# Patient Record
Sex: FEMALE | Race: WHITE | NOT HISPANIC OR LATINO | Employment: OTHER | URBAN - METROPOLITAN AREA
[De-identification: names, ages, dates, MRNs, and addresses within clinical notes are randomized per-mention and may not be internally consistent; named-entity substitution may affect disease eponyms.]

---

## 2017-02-06 ENCOUNTER — LAB VISIT (OUTPATIENT)
Dept: LAB | Facility: OTHER | Age: 72
End: 2017-02-06
Attending: INTERNAL MEDICINE
Payer: MEDICARE

## 2017-02-06 DIAGNOSIS — R73.9 ELEVATED BLOOD SUGAR: ICD-10-CM

## 2017-02-06 DIAGNOSIS — E03.9 HYPOTHYROIDISM, UNSPECIFIED TYPE: ICD-10-CM

## 2017-02-06 LAB
ALBUMIN SERPL BCP-MCNC: 3.9 G/DL
ALP SERPL-CCNC: 52 U/L
ALT SERPL W/O P-5'-P-CCNC: 28 U/L
ANION GAP SERPL CALC-SCNC: 8 MMOL/L
AST SERPL-CCNC: 27 U/L
BILIRUB DIRECT SERPL-MCNC: 0.3 MG/DL
BILIRUB SERPL-MCNC: 0.5 MG/DL
BUN SERPL-MCNC: 16 MG/DL
CALCIUM SERPL-MCNC: 9.5 MG/DL
CHLORIDE SERPL-SCNC: 106 MMOL/L
CO2 SERPL-SCNC: 26 MMOL/L
CREAT SERPL-MCNC: 1 MG/DL
EST. GFR  (AFRICAN AMERICAN): >60 ML/MIN/1.73 M^2
EST. GFR  (NON AFRICAN AMERICAN): 56 ML/MIN/1.73 M^2
GLUCOSE SERPL-MCNC: 195 MG/DL
POTASSIUM SERPL-SCNC: 4.7 MMOL/L
PROT SERPL-MCNC: 6.8 G/DL
SODIUM SERPL-SCNC: 140 MMOL/L
T4 FREE SERPL-MCNC: 1.22 NG/DL
TSH SERPL DL<=0.005 MIU/L-ACNC: 0.32 UIU/ML

## 2017-02-06 PROCEDURE — 80076 HEPATIC FUNCTION PANEL: CPT

## 2017-02-06 PROCEDURE — 80048 BASIC METABOLIC PNL TOTAL CA: CPT

## 2017-02-06 PROCEDURE — 84443 ASSAY THYROID STIM HORMONE: CPT

## 2017-02-06 PROCEDURE — 83036 HEMOGLOBIN GLYCOSYLATED A1C: CPT

## 2017-02-06 PROCEDURE — 36415 COLL VENOUS BLD VENIPUNCTURE: CPT

## 2017-02-06 PROCEDURE — 84439 ASSAY OF FREE THYROXINE: CPT

## 2017-02-07 LAB
ESTIMATED AVG GLUCOSE: 143 MG/DL
HBA1C MFR BLD HPLC: 6.6 %

## 2017-02-20 ENCOUNTER — OFFICE VISIT (OUTPATIENT)
Dept: UROLOGY | Facility: CLINIC | Age: 72
End: 2017-02-20
Attending: UROLOGY
Payer: MEDICARE

## 2017-02-20 VITALS
SYSTOLIC BLOOD PRESSURE: 121 MMHG | DIASTOLIC BLOOD PRESSURE: 70 MMHG | HEIGHT: 65 IN | BODY MASS INDEX: 25.61 KG/M2 | WEIGHT: 153.69 LBS | HEART RATE: 65 BPM

## 2017-02-20 DIAGNOSIS — N39.0 RECURRENT UTI: Primary | ICD-10-CM

## 2017-02-20 LAB
BILIRUB SERPL-MCNC: NORMAL MG/DL
BLOOD URINE, POC: NORMAL
COLOR, POC UA: YELLOW
GLUCOSE UR QL STRIP: NORMAL
KETONES UR QL STRIP: NORMAL
LEUKOCYTE ESTERASE URINE, POC: NORMAL
NITRITE, POC UA: NORMAL
PH, POC UA: 5
POC RESIDUAL URINE VOLUME: 33 ML (ref 0–100)
PROTEIN, POC: NORMAL
SPECIFIC GRAVITY, POC UA: 1
UROBILINOGEN, POC UA: NORMAL

## 2017-02-20 PROCEDURE — 51798 US URINE CAPACITY MEASURE: CPT | Mod: PBBFAC | Performed by: UROLOGY

## 2017-02-20 PROCEDURE — 99213 OFFICE O/P EST LOW 20 MIN: CPT | Mod: S$PBB,,, | Performed by: UROLOGY

## 2017-02-20 PROCEDURE — 99999 PR PBB SHADOW E&M-EST. PATIENT-LVL III: CPT | Mod: PBBFAC,,, | Performed by: UROLOGY

## 2017-02-20 PROCEDURE — 99213 OFFICE O/P EST LOW 20 MIN: CPT | Mod: PBBFAC | Performed by: UROLOGY

## 2017-02-20 PROCEDURE — 81002 URINALYSIS NONAUTO W/O SCOPE: CPT | Mod: PBBFAC | Performed by: UROLOGY

## 2017-02-20 RX ORDER — TRAMADOL HYDROCHLORIDE 50 MG/1
50 TABLET ORAL EVERY 12 HOURS
Refills: 0 | COMMUNITY
Start: 2017-02-08 | End: 2017-08-21

## 2017-02-20 NOTE — MR AVS SNAPSHOT
[facility]  [address] [place] [address], [address]  [facility] [address]  Phone: [phone]                   Hardik Judd   2017 11:45 AM   Office Visit    Description:  Female : 1945   Provider:  Db Epstein MD   Department:  [facility]           Reason for Visit     Follow-up                To Do List           Goals (5 Years of Data)     None      [facility]     [facility] Care [facility] -  Assistance  Registered nurses in the [facility] provide clinical advisement, health education, appointment booking, and other advisory services.  Call for this free service at [phone].             Medications           Message regarding Medications     Verify the changes and/or additions to your medication regime listed below are the same as discussed with your clinician today.  If any of these changes or additions are incorrect, please notify your healthcare provider.             Verify that the below list of medications is an accurate representation of the medications you are currently taking.  If none reported, the list may be blank. If incorrect, please contact your healthcare provider. Carry this list with you in case of emergency.           Current Medications     atorvastatin (LIPITOR) 40 MG tablet take 1 tablet by mouth once daily    azelastine (ASTELIN) 137 mcg nasal spray 1 spray (137 mcg total) by Nasal route 2 (two) times daily.    blood sugar diagnostic Strp Dispense madie contour strips; test blood sugar once daily    blood-glucose meter (CONTOUR METER) kit Please dispense Madie Contour meter and supplies Use as instructed Test Blood sugar once daily    ciprofloxacin HCl (CIPRO) 250 MG tablet     conjugated estrogens (PREMARIN) vaginal cream Place 0.5 g vaginally twice a week.    fluticasone (FLONASE) 50 mcg/actuation nasal spray 2 sprays by Each Nare route once daily.    GINGER ROOT (GINGER EXTRACT ORAL) Take by mouth.    hydrocortisone 2.5  "% cream     ipratropium (ATROVENT) 0.06 % nasal spray     lancets (ONE TOUCH ULTRASOFT LANCETS) Misc Test blood sugar once daily    levothyroxine (SYNTHROID) 88 MCG tablet Take 1 tablet (88 mcg total) by mouth before breakfast. 1 Tablet Oral Every morning    magnesium 30 mg Tab Take 500 capsules by mouth. Patient's taking magnesium 500mg QD    meloxicam (MOBIC) 15 MG tablet Take 1 tablet (15 mg total) by mouth daily as needed for Pain. With food for arthritis    metformin (GLUCOPHAGE-XR) 750 MG 24 hr tablet Take 1 tablet (750 mg total) by mouth 2 (two) times daily with meals.    ospemifene (OSPHENA) 60 mg Tab Take 60 mg by mouth once daily.    pregabalin (LYRICA) 100 MG capsule Take 1 capsule (100 mg total) by mouth 2 (two) times daily.    tramadol (ULTRAM) 50 mg tablet Take 50 mg by mouth every 12 (twelve) hours.    turmeric root extract 500 mg Cap Take by mouth.    vitamin D 1000 units Tab Take 185 mg by mouth once daily. D3           Clinical Reference Information           Your Vitals Were     BP Pulse Height Weight Last Period BMI    121/70 (BP Location: Right arm, Patient Position: Sitting, BP Method: Automatic) 65 5' 4.8" (1.646 m) 69.7 kg (153 lb 10.6 oz) (LMP Unknown) 25.73 kg/m2      Blood Pressure          Most Recent Value    BP  121/70      Allergies as of 2/20/2017     Codeine    Percocet  [Oxycodone-acetaminophen]    Sulfa (Sulfonamide Antibiotics)    Adhesive      Immunizations Administered on Date of Encounter - 2/20/2017     None      Language Assistance Services     ATTENTION: Language assistance services are available, free of charge. Please call [phone].      ATENCIÓN: Si rickyla linomarii, tiene a españa disposición servicios gratuitos de asistencia lingüística. Llame al [phone].     CHARLA Ý: N?u b?n nói Ti?ng Vi?t, có các d?ch v? h? tr? ngôn ng? mi?n phí dành cho b?n. G?i s? [phone].         [facility] complies with applicable Federal civil rights laws and does not " discriminate on the basis of race, color, national origin, age, disability, or sex.

## 2017-02-20 NOTE — PROGRESS NOTES
"Subjective:      Anayeli Judd is a 72 y.o. female who returns today regarding her recurrent UTIs.    She has not had UTI since starting prophylactic cipro 3 months ago. Had negative workup prior to that. Had at least 4 culture proven UTIs in 2016 prior to starting treatment.    Doing well today w/o c/o.    Her history is notable for three "bladder lifts," the last over 10 years ago and that she knows was done with mesh.    The following portions of the patient's history were reviewed and updated as appropriate: allergies, current medications, past family history, past medical history, past social history, past surgical history and problem list.    Review of Systems  A comprehensive multipoint review of systems was negative except as otherwise stated in the HPI.     Objective:   Vitals:   Visit Vitals    /70 (BP Location: Right arm, Patient Position: Sitting, BP Method: Automatic)    Pulse 65    Ht 5' 4.8" (1.646 m)    Wt 69.7 kg (153 lb 10.6 oz)    LMP  (LMP Unknown)    BMI 25.73 kg/m2       Physical Exam   General: alert and oriented, no acute distress  Respiratory: Symmetric expansion, non-labored breathing  Neuro: no gross deficits  Psych: normal judgment and insight, normal mood/affect and non-anxious    Bladder Scan PVR: 33cc      Lab Review   Urinalysis demonstrates negative for all components  Lab Results   Component Value Date    WBC 4.25 11/02/2016    HGB 13.4 11/02/2016    HCT 40.8 11/02/2016    HCT 23 (L) 09/03/2014    MCV 93 11/02/2016     (L) 11/02/2016     Lab Results   Component Value Date    CREATININE 1.0 02/06/2017    BUN 16 02/06/2017     Imaging   None today    Assessment and Plan:   1. Recurrent UTI  - Continue Cipro 250mg daily for 3 more months  - UA/UCx PRN  - FU 6 months  "

## 2017-03-29 ENCOUNTER — HOSPITAL ENCOUNTER (OUTPATIENT)
Dept: RADIOLOGY | Facility: OTHER | Age: 72
Discharge: HOME | DRG: 329 | End: 2017-03-29
Attending: INTERNAL MEDICINE
Payer: MEDICARE

## 2017-03-29 DIAGNOSIS — R10.9 ABDOMINAL PAIN, UNSPECIFIED LOCATION: ICD-10-CM

## 2017-03-29 PROCEDURE — 74020 XR ABDOMEN FLAT AND ERECT: CPT | Mod: 26,,, | Performed by: RADIOLOGY

## 2017-03-30 ENCOUNTER — HOSPITAL ENCOUNTER (INPATIENT)
Facility: HOSPITAL | Age: 72
LOS: 21 days | Discharge: HOME HEALTH | DRG: 329 | End: 2017-04-20
Attending: EMERGENCY MEDICINE | Admitting: SURGERY
Payer: MEDICARE

## 2017-03-30 DIAGNOSIS — N81.84 PELVIC MUSCLE WASTING: ICD-10-CM

## 2017-03-30 DIAGNOSIS — E08.21 DIABETES MELLITUS DUE TO UNDERLYING CONDITION WITH DIABETIC NEPHROPATHY, WITHOUT LONG-TERM CURRENT USE OF INSULIN: ICD-10-CM

## 2017-03-30 DIAGNOSIS — E68 SEQUELAE OF HYPERALIMENTATION: ICD-10-CM

## 2017-03-30 DIAGNOSIS — Z80.3 FAMILY HISTORY OF BREAST CANCER: ICD-10-CM

## 2017-03-30 DIAGNOSIS — K56.609 SMALL BOWEL OBSTRUCTION: ICD-10-CM

## 2017-03-30 DIAGNOSIS — R20.0 NUMBNESS OF FACE: ICD-10-CM

## 2017-03-30 DIAGNOSIS — K56.600 PARTIAL SMALL BOWEL OBSTRUCTION: ICD-10-CM

## 2017-03-30 DIAGNOSIS — R26.9 GAIT DISTURBANCE: ICD-10-CM

## 2017-03-30 DIAGNOSIS — M47.22 CERVICAL SPONDYLOSIS WITH RADICULOPATHY: ICD-10-CM

## 2017-03-30 DIAGNOSIS — Z85.038 HISTORY OF COLON CANCER: ICD-10-CM

## 2017-03-30 DIAGNOSIS — E03.4 HYPOTHYROIDISM DUE TO ACQUIRED ATROPHY OF THYROID: ICD-10-CM

## 2017-03-30 DIAGNOSIS — K76.0 FATTY LIVER: ICD-10-CM

## 2017-03-30 DIAGNOSIS — K56.609 SBO (SMALL BOWEL OBSTRUCTION): Primary | ICD-10-CM

## 2017-03-30 DIAGNOSIS — I82.629: ICD-10-CM

## 2017-03-30 DIAGNOSIS — E11.9 TYPE 2 DIABETES MELLITUS WITHOUT COMPLICATION, WITHOUT LONG-TERM CURRENT USE OF INSULIN: Chronic | ICD-10-CM

## 2017-03-30 DIAGNOSIS — K92.2 GASTROINTESTINAL HEMORRHAGE, UNSPECIFIED GASTROINTESTINAL HEMORRHAGE TYPE: ICD-10-CM

## 2017-03-30 DIAGNOSIS — E83.42 HYPOMAGNESEMIA: ICD-10-CM

## 2017-03-30 LAB
ABO + RH BLD: NORMAL
ALBUMIN SERPL BCP-MCNC: 4.1 G/DL
ALP SERPL-CCNC: 56 U/L
ALT SERPL W/O P-5'-P-CCNC: 23 U/L
ANION GAP SERPL CALC-SCNC: 13 MMOL/L
AST SERPL-CCNC: 24 U/L
BASOPHILS # BLD AUTO: 0.03 K/UL
BASOPHILS NFR BLD: 0.3 %
BILIRUB SERPL-MCNC: 1.1 MG/DL
BLD GP AB SCN CELLS X3 SERPL QL: NORMAL
BUN SERPL-MCNC: 14 MG/DL
CALCIUM SERPL-MCNC: 10.3 MG/DL
CHLORIDE SERPL-SCNC: 101 MMOL/L
CO2 SERPL-SCNC: 23 MMOL/L
CREAT SERPL-MCNC: 1.1 MG/DL
DIFFERENTIAL METHOD: ABNORMAL
EOSINOPHIL # BLD AUTO: 0.4 K/UL
EOSINOPHIL NFR BLD: 3.6 %
ERYTHROCYTE [DISTWIDTH] IN BLOOD BY AUTOMATED COUNT: 12.5 %
EST. GFR  (AFRICAN AMERICAN): 57.2 ML/MIN/1.73 M^2
EST. GFR  (NON AFRICAN AMERICAN): 49.6 ML/MIN/1.73 M^2
GLUCOSE SERPL-MCNC: 153 MG/DL
HCT VFR BLD AUTO: 45.5 %
HGB BLD-MCNC: 15.7 G/DL
INR PPP: 1
LYMPHOCYTES # BLD AUTO: 2.3 K/UL
LYMPHOCYTES NFR BLD: 23.9 %
MCH RBC QN AUTO: 31.7 PG
MCHC RBC AUTO-ENTMCNC: 34.5 %
MCV RBC AUTO: 92 FL
MONOCYTES # BLD AUTO: 1.1 K/UL
MONOCYTES NFR BLD: 11.7 %
NEUTROPHILS # BLD AUTO: 5.8 K/UL
NEUTROPHILS NFR BLD: 60.4 %
PLATELET # BLD AUTO: 121 K/UL
PMV BLD AUTO: 11.4 FL
POCT GLUCOSE: 98 MG/DL (ref 70–110)
POTASSIUM SERPL-SCNC: 4.2 MMOL/L
PROT SERPL-MCNC: 8.1 G/DL
PROTHROMBIN TIME: 10.7 SEC
RBC # BLD AUTO: 4.96 M/UL
SODIUM SERPL-SCNC: 137 MMOL/L
WBC # BLD AUTO: 9.63 K/UL

## 2017-03-30 PROCEDURE — 94761 N-INVAS EAR/PLS OXIMETRY MLT: CPT

## 2017-03-30 PROCEDURE — 25500020 PHARM REV CODE 255: Performed by: EMERGENCY MEDICINE

## 2017-03-30 PROCEDURE — 86900 BLOOD TYPING SEROLOGIC ABO: CPT

## 2017-03-30 PROCEDURE — 85025 COMPLETE CBC W/AUTO DIFF WBC: CPT

## 2017-03-30 PROCEDURE — 63600175 PHARM REV CODE 636 W HCPCS: Performed by: GENERAL PRACTICE

## 2017-03-30 PROCEDURE — 93010 ELECTROCARDIOGRAM REPORT: CPT | Mod: ,,, | Performed by: INTERNAL MEDICINE

## 2017-03-30 PROCEDURE — 83036 HEMOGLOBIN GLYCOSYLATED A1C: CPT

## 2017-03-30 PROCEDURE — 99285 EMERGENCY DEPT VISIT HI MDM: CPT | Mod: ,,, | Performed by: SURGERY

## 2017-03-30 PROCEDURE — 85610 PROTHROMBIN TIME: CPT

## 2017-03-30 PROCEDURE — 80053 COMPREHEN METABOLIC PANEL: CPT

## 2017-03-30 PROCEDURE — 86850 RBC ANTIBODY SCREEN: CPT

## 2017-03-30 PROCEDURE — 96375 TX/PRO/DX INJ NEW DRUG ADDON: CPT

## 2017-03-30 PROCEDURE — 82962 GLUCOSE BLOOD TEST: CPT

## 2017-03-30 PROCEDURE — 96376 TX/PRO/DX INJ SAME DRUG ADON: CPT

## 2017-03-30 PROCEDURE — 12000002 HC ACUTE/MED SURGE SEMI-PRIVATE ROOM

## 2017-03-30 PROCEDURE — 86920 COMPATIBILITY TEST SPIN: CPT

## 2017-03-30 PROCEDURE — 25000003 PHARM REV CODE 250: Performed by: GENERAL PRACTICE

## 2017-03-30 PROCEDURE — 93005 ELECTROCARDIOGRAM TRACING: CPT

## 2017-03-30 PROCEDURE — 96361 HYDRATE IV INFUSION ADD-ON: CPT

## 2017-03-30 PROCEDURE — 96374 THER/PROPH/DIAG INJ IV PUSH: CPT

## 2017-03-30 PROCEDURE — 99285 EMERGENCY DEPT VISIT HI MDM: CPT | Mod: 25

## 2017-03-30 PROCEDURE — 63600175 PHARM REV CODE 636 W HCPCS: Performed by: SURGERY

## 2017-03-30 RX ORDER — INSULIN ASPART 100 [IU]/ML
0-5 INJECTION, SOLUTION INTRAVENOUS; SUBCUTANEOUS EVERY 6 HOURS PRN
Status: DISCONTINUED | OUTPATIENT
Start: 2017-03-30 | End: 2017-04-08

## 2017-03-30 RX ORDER — AMOXICILLIN AND CLAVULANATE POTASSIUM 562.5; 437.5; 62.5 MG/1; MG/1; MG/1
2 TABLET, FILM COATED, EXTENDED RELEASE ORAL 2 TIMES DAILY
Status: ON HOLD | COMMUNITY
End: 2017-04-20 | Stop reason: HOSPADM

## 2017-03-30 RX ORDER — DIPHENHYDRAMINE HYDROCHLORIDE 50 MG/ML
25 INJECTION INTRAMUSCULAR; INTRAVENOUS EVERY 4 HOURS PRN
Status: DISCONTINUED | OUTPATIENT
Start: 2017-03-30 | End: 2017-04-01

## 2017-03-30 RX ORDER — SODIUM CHLORIDE 0.9 % (FLUSH) 0.9 %
3 SYRINGE (ML) INJECTION EVERY 8 HOURS
Status: DISCONTINUED | OUTPATIENT
Start: 2017-03-31 | End: 2017-04-20 | Stop reason: HOSPADM

## 2017-03-30 RX ORDER — ONDANSETRON 2 MG/ML
4 INJECTION INTRAMUSCULAR; INTRAVENOUS EVERY 8 HOURS PRN
Status: DISCONTINUED | OUTPATIENT
Start: 2017-03-30 | End: 2017-04-01

## 2017-03-30 RX ORDER — GLUCAGON 1 MG
1 KIT INJECTION
Status: DISCONTINUED | OUTPATIENT
Start: 2017-03-30 | End: 2017-04-11

## 2017-03-30 RX ORDER — SODIUM CHLORIDE 9 MG/ML
INJECTION, SOLUTION INTRAVENOUS CONTINUOUS
Status: DISCONTINUED | OUTPATIENT
Start: 2017-03-30 | End: 2017-03-31

## 2017-03-30 RX ORDER — MORPHINE SULFATE 2 MG/ML
4 INJECTION, SOLUTION INTRAMUSCULAR; INTRAVENOUS
Status: COMPLETED | OUTPATIENT
Start: 2017-03-30 | End: 2017-03-30

## 2017-03-30 RX ORDER — MORPHINE SULFATE 2 MG/ML
2 INJECTION, SOLUTION INTRAMUSCULAR; INTRAVENOUS EVERY 4 HOURS PRN
Status: DISCONTINUED | OUTPATIENT
Start: 2017-03-30 | End: 2017-04-01

## 2017-03-30 RX ORDER — ONDANSETRON 2 MG/ML
4 INJECTION INTRAMUSCULAR; INTRAVENOUS
Status: COMPLETED | OUTPATIENT
Start: 2017-03-30 | End: 2017-03-30

## 2017-03-30 RX ADMIN — MORPHINE SULFATE 4 MG: 2 INJECTION, SOLUTION INTRAMUSCULAR; INTRAVENOUS at 09:03

## 2017-03-30 RX ADMIN — ONDANSETRON 4 MG: 2 INJECTION INTRAMUSCULAR; INTRAVENOUS at 09:03

## 2017-03-30 RX ADMIN — IOHEXOL 75 ML: 350 INJECTION, SOLUTION INTRAVENOUS at 09:03

## 2017-03-30 RX ADMIN — SODIUM CHLORIDE 1000 ML: 0.9 INJECTION, SOLUTION INTRAVENOUS at 09:03

## 2017-03-30 RX ADMIN — MORPHINE SULFATE 2 MG: 2 INJECTION, SOLUTION INTRAMUSCULAR; INTRAVENOUS at 11:03

## 2017-03-30 NOTE — IP AVS SNAPSHOT
[facility]  [address]  [facility] [address]  Phone: [phone]           Patient Discharge Instructions   Our goal is to set you up for success. This packet includes information on your condition, medications, and your home care.  It will help you care for yourself to prevent having to return to the hospital.     Please ask your nurse if you have any questions.      There are many details to remember when preparing to leave the hospital. Here is what you will need to do:    1. Take your medicine. If you are prescribed medications, review your Medication List on the following pages. You may have new medications to  at the pharmacy and others that you'll need to stop taking. Review the instructions for how and when to take your medications. Talk with your doctor or nurses if you are unsure of what to do.     2. Go to your follow-up appointments. Specific follow-up information is listed in the following pages. Your may be contacted by a nurse or clinical provider about future appointments. Be sure we have all of the phone numbers to reach you. Please contact your provider's office if you are unable to make an appointment.     3. Watch for warning signs. Your doctor or nurse will give you detailed warning signs to watch for and when to call for assistance. These instructions may also include educational information about your condition. If you experience any of warning signs to your health, call your doctor.           [facility]  Unless otherwise directed by your provider, please   contact [facility], our nurse care line   that is available for 24/7 assistance.     [phone] (toll-free)     Registered nurses in the [facility]   provide: appointment scheduling, clinical advisement, health education, and other advisory services.                  ** Verify the list of medication(s) below is accurate and up to date. Carry this with you in case of  emergency. If your medications have changed, please notify your healthcare provider.             Medication List      START taking these medications        Additional Info                      diphenhydrAMINE 25 mg capsule   Commonly known as:  BENADRYL   Refills:  0   Dose:  25 mg    Last time this was given:  25 mg on 4/19/2017 11:13 PM   Instructions:  Take 1 each (25 mg total) by mouth nightly as needed for Itching, Allergies or Insomnia.     Begin Date    AM    Noon    PM    Bedtime       hydrocodone-acetaminophen 5-325mg 5-325 mg per tablet   Commonly known as:  NORCO   Quantity:  41 tablet   Refills:  0   Dose:  1 tablet    Last time this was given:  1 tablet on 4/20/2017  4:46 AM   Instructions:  Take 1 tablet by mouth every 6 (six) hours as needed.     Begin Date    AM    Noon    PM    Bedtime       pantoprazole 40 MG tablet   Commonly known as:  PROTONIX   Refills:  0   Dose:  40 mg    Last time this was given:  40 mg on 4/20/2017 10:54 AM   Instructions:  Take 1 tablet (40 mg total) by mouth once daily.     Begin Date    AM    Noon    PM    Bedtime       polyethylene glycol 17 gram Pwpk   Commonly known as:  GLYCOLAX   Refills:  0   Dose:  17 g    Last time this was given:  17 g on 4/20/2017 11:01 AM   Instructions:  Take 17 g by mouth once daily.     Begin Date    AM    Noon    PM    Bedtime       senna-docusate 8.6-50 mg 8.6-50 mg per tablet   Commonly known as:  PERICOLACE   Refills:  0   Dose:  1 tablet    Last time this was given:  1 tablet on 4/15/2017  8:17 AM   Instructions:  Take 1 tablet by mouth once daily.     Begin Date    AM    Noon    PM    Bedtime         CONTINUE taking these medications        Additional Info                      atorvastatin 40 MG tablet   Commonly known as:  LIPITOR   Quantity:  90 tablet   Refills:  1    Instructions:  take 1 tablet by mouth once daily     Begin Date    AM    Noon    PM    Bedtime       azelastine 137 mcg (0.1 %) nasal spray   Commonly known as:   ASTELIN   Quantity:  30 mL   Refills:  1   Dose:  1 spray    Instructions:  1 spray (137 mcg total) by Nasal route 2 (two) times daily.     Begin Date    AM    Noon    PM    Bedtime       blood sugar diagnostic Strp   Quantity:  100 strip   Refills:  11    Instructions:  Dispense OpenQ contour strips; test blood sugar once daily     Begin Date    AM    Noon    PM    Bedtime       blood-glucose meter kit   Commonly known as:  CONTOUR METER   Quantity:  1 each   Refills:  0    Instructions:  Please dispense Stabilitech Contour meter and supplies Use as instructed Test Blood sugar once daily     Begin Date    AM    Noon    PM    Bedtime       conjugated estrogens vaginal cream   Commonly known as:  PREMARIN   Quantity:  1 applicator   Refills:  3   Dose:  0.5 g    Instructions:  Place 0.5 g vaginally twice a week.     Begin Date    AM    Noon    PM    Bedtime       fluticasone 50 mcg/actuation nasal spray   Commonly known as:  FLONASE   Refills:  0   Dose:  2 spray    Instructions:  2 sprays by Each Nare route once daily.     Begin Date    AM    Noon    PM    Bedtime       hydrocortisone 2.5 % cream   Refills:  0      Begin Date    AM    Noon    PM    Bedtime       ipratropium 0.06 % nasal spray   Commonly known as:  ATROVENT   Refills:  0      Begin Date    AM    Noon    PM    Bedtime       lancets Misc   Commonly known as:  ONETOUCH ULTRASOFT LANCETS   Quantity:  200 each   Refills:  11    Instructions:  Test blood sugar once daily     Begin Date    AM    Noon    PM    Bedtime       levothyroxine 88 MCG tablet   Commonly known as:  SYNTHROID   Quantity:  90 tablet   Refills:  4   Dose:  88 mcg    Last time this was given:  88 mcg on 4/20/2017  5:39 AM   Instructions:  Take 1 tablet (88 mcg total) by mouth before breakfast. 1 Tablet Oral Every morning     Begin Date    AM    Noon    PM    Bedtime       LYRICA 100 MG capsule   Quantity:  180 capsule   Refills:  3   Generic drug:  pregabalin    Instructions:  take 1 capsule  by mouth twice a day     Begin Date    AM    Noon    PM    Bedtime       magnesium 30 mg Tab   Refills:  0   Dose:  500 capsule    Instructions:  Take 500 capsules by mouth. Patient's taking magnesium 500mg QD     Begin Date    AM    Noon    PM    Bedtime       meloxicam 15 MG tablet   Commonly known as:  MOBIC   Quantity:  90 tablet   Refills:  1   Dose:  15 mg    Instructions:  Take 1 tablet (15 mg total) by mouth daily as needed for Pain. With food for arthritis     Begin Date    AM    Noon    PM    Bedtime       metformin 750 MG 24 hr tablet   Commonly known as:  GLUCOPHAGE-XR   Quantity:  180 tablet   Refills:  1   Dose:  750 mg    Instructions:  Take 1 tablet (750 mg total) by mouth 2 (two) times daily with meals.     Begin Date    AM    Noon    PM    Bedtime       ondansetron 8 MG tablet   Commonly known as:  ZOFRAN   Quantity:  20 tablet   Refills:  0   Dose:  8 mg    Instructions:  Take 1 tablet (8 mg total) by mouth every 8 (eight) hours as needed for Nausea.     Begin Date    AM    Noon    PM    Bedtime       ospemifene 60 mg Tab   Commonly known as:  OSPHENA   Quantity:  90 tablet   Refills:  1   Dose:  60 mg    Instructions:  Take 60 mg by mouth once daily.     Begin Date    AM    Noon    PM    Bedtime       tramadol 50 mg tablet   Commonly known as:  ULTRAM   Refills:  0   Dose:  50 mg    Instructions:  Take 50 mg by mouth every 12 (twelve) hours.     Begin Date    AM    Noon    PM    Bedtime       vitamin D 1000 units Tab   Refills:  0   Dose:  185 mg    Instructions:  Take 185 mg by mouth once daily. D3     Begin Date    AM    Noon    PM    Bedtime         STOP taking these medications     amoxicillin-clavulanate 1,000-62.5 mg 1,000-62.5 mg per tablet   Commonly known as:  AUGMENTIN XR       ciprofloxacin HCl 250 MG tablet   Commonly known as:  CIPRO       GINGER EXTRACT ORAL       turmeric root extract 500 mg Cap            Where to Get Your Medications      You can get these medications from any  pharmacy     Bring a paper prescription for each of these medications     hydrocodone-acetaminophen 5-325mg 5-325 mg per tablet         Information about where to get these medications is not yet available     ! Ask your nurse or doctor about these medications     diphenhydrAMINE 25 mg capsule    pantoprazole 40 MG tablet    polyethylene glycol 17 gram Pwpk    senna-docusate 8.6-50 mg 8.6-50 mg per tablet                  Please bring to all follow up appointments:    1. A copy of your discharge instructions.  2. All medicines you are currently taking in their original bottles.  3. Identification and insurance card.    Please arrive 15 minutes ahead of scheduled appointment time.    Please call 24 hours in advance if you must reschedule your appointment and/or time.        Your Scheduled Appointments     May 09, 2017 10:15 AM CDT   Post OP with MD Matias Olivares [facility] - General Surgery ([facility] )    [address]  [place] [id number] [phone]            Aug 21, 2017 10:30 AM CDT   Established Patient Visit with Db Epstein MD   [facility] - Urology ([facility])    [address] [place] [address] [address]  [facility] [address] [phone]              Follow-up Information     Follow up with Herman Fletcher MD In 2 weeks.    Specialties:  General Surgery, Surgery    Why:  Post-op appt/Office to call you w/appt. Appt scheduled on 5/9/17 at 10:15 AM.     Contact information:    [address] [facility] [address]  [phone]          Follow up with Soni Solo.    Specialties:  Nursing Home Agency, SNF Agency    Why:  SNF    Contact information:    [address] [facility] [address]  [phone]          Follow up with Herman Fletcher MD In 2 weeks.    Specialties:  General Surgery, Surgery    Why:  For wound re-check    Contact information:    [address] [facility] [id number] [phone]          Follow up with Soni Solo  "Nursing HOme Today.    Why:  Pt will be admitted for SNF for placement.    Contact information:    # [phone]          Primary Diagnosis     Your primary diagnosis was:  Small Bowel Obstruction      Admission Information     Date & Time Provider Department CSN    3/30/2017  7:19 PM Herman Fletcher MD [facility] [id number]      Care Providers     Provider Role Specialty Primary office phone    Herman Fletcher MD Attending Provider General Surgery [phone]    Herman Fletcher MD Surgeon  General Surgery [phone]    Horacio Erazo MD Consulting Physician  Colon and Rectal Surgery  [phone]      Important Medicare Message          Most Recent Value    Important Message from Medicare Regarding Discharge Appeal Rights  Given to patient/caregiver, Explained to patient/caregiver, Signed/date by patient/caregiver yes 04/19/2017 1255      Your Vitals Were     BP Pulse Temp Resp Height Weight    116/59 (BP Location: Right arm, Patient Position: Lying, BP Method: Automatic) 70 99.6 °F (37.6 °C) (Oral) 18 5' 4" (1.626 m) 69.9 kg (154 lb)    Last Period SpO2 BMI          (LMP Unknown) 95% 26.43 kg/m2        Recent Lab Values        5/7/2015 7/8/2015 11/5/2015 5/16/2016 9/21/2016 11/2/2016 2/6/2017 3/30/2017      7:28 AM  1:03 PM 10:35 AM 11:12 AM 11:20 AM  8:23 AM  3:05 PM 11:04 PM    A1C 6.7 (H) 6.0 6.6 (H) 7.2 (H) 6.8 (H) 6.5 (H) 6.6 (H) 6.6 (H)    Comment for A1C at 11:20 AM on 9/21/2016:  According to ADA guidelines, hemoglobin A1C <7.0% represents  optimal control in non-pregnant diabetic patients.  Different  metrics may apply to specific populations.   Standards of Medical Care in Diabetes - 2016.  For the purpose of screening for the presence of diabetes:  <5.7%     Consistent with the absence of diabetes  5.7-6.4%  Consistent with increasing risk for diabetes   (prediabetes)  >or=6.5%  Consistent with diabetes  Currently no consensus exists for use of hemoglobin A1C  for " diagnosis of diabetes for children.      Comment for A1C at  8:23 AM on 11/2/2016:  According to ADA guidelines, hemoglobin A1C <7.0% represents  optimal control in non-pregnant diabetic patients.  Different  metrics may apply to specific populations.   Standards of Medical Care in Diabetes - 2016.  For the purpose of screening for the presence of diabetes:  <5.7%     Consistent with the absence of diabetes  5.7-6.4%  Consistent with increasing risk for diabetes   (prediabetes)  >or=6.5%  Consistent with diabetes  Currently no consensus exists for use of hemoglobin A1C  for diagnosis of diabetes for children.      Comment for A1C at  3:05 PM on 2/6/2017:  According to ADA guidelines, hemoglobin A1C <7.0% represents  optimal control in non-pregnant diabetic patients.  Different  metrics may apply to specific populations.   Standards of Medical Care in Diabetes - 2016.  For the purpose of screening for the presence of diabetes:  <5.7%     Consistent with the absence of diabetes  5.7-6.4%  Consistent with increasing risk for diabetes   (prediabetes)  >or=6.5%  Consistent with diabetes  Currently no consensus exists for use of hemoglobin A1C  for diagnosis of diabetes for children.      Comment for A1C at 11:04 PM on 3/30/2017:  According to ADA guidelines, hemoglobin A1C <7.0% represents  optimal control in non-pregnant diabetic patients.  Different  metrics may apply to specific populations.   Standards of Medical Care in Diabetes - 2016.  For the purpose of screening for the presence of diabetes:  <5.7%     Consistent with the absence of diabetes  5.7-6.4%  Consistent with increasing risk for diabetes   (prediabetes)  >or=6.5%  Consistent with diabetes  Currently no consensus exists for use of hemoglobin A1C  for diagnosis of diabetes for children.        Pending Labs     Order Current Status    CBC auto differential In process    Comprehensive metabolic panel In process    Magnesium In process    Prepare RBC 2  Units; GI bleed Preliminary result      Allergies as of 4/20/2017        Reactions    Codeine Nausea And Vomiting    Other reaction(s): Itching    Percocet  [Oxycodone-acetaminophen]     Other reaction(s): Itching    Sulfa (Sulfonamide Antibiotics)     Other reaction(s): Nausea  Other reaction(s): Itching    Adhesive Rash      Advance Directives     An advance directive is a document which, in the event you are no longer able to make decisions for yourself, tells your healthcare team what kind of treatment you do or do not want to receive, or who you would like to make those decisions for you.  If you do not currently have an advance directive, [facility] encourages you to create one.  For more information call:  [phone]), [phone] ([phone]),  or log on to [email or web address].        Language Assistance Services     ATTENTION: Language assistance services are available, free of charge. Please call [phone].      ATENCIÓN: Si habla español, tiene a españa disposición servicios gratuitos de asistencia lingüística. Llame al [phone].     [facility] Ý: N?u b?n nói Ti?ng Vi?t, có các d?ch v? h? tr? ngôn ng? mi?n phí dành cho b?n. G?i s? [phone].        Blood Transfusion Reaction Signs and Symptoms     The blood you have received has been matched for you as carefully as possible. Most patients who receive a blood transfusion do not experience problems. However, there can be a delayed reaction that happens a few weeks after your blood transfusion. Contact your physician immediately if you experience any NEW SYMPTOMS listed below:     Fever greater than 100.4 degrees    Chills   Yellow color to your skin or eyes(Jaundice)   Back pain, chest pain, or pain at the infusion site   Weakness (more than usual)   Discomfort or uneasiness more than usual (Malaise)   Nausea or vomiting   Shortness of breath, wheezing, or coughing   Higher or lower blood pressure than normal   Skin rash,  itching, skin redness, or localized swelling (example: hands or feet)   Urinating less than normal   Urine appears reddish or orange and is darker than normal      Remember that some these signs may already exist for you--such as having chronic back pain or high blood pressure. You only need to look for and report to your doctor any new occurrences since your blood transfusion that are of concern.        Diabetes Discharge Instructions                                    [facility] complies with applicable Federal civil rights laws and does not discriminate on the basis of race, color, national origin, age, disability, or sex.

## 2017-03-31 ENCOUNTER — ANESTHESIA EVENT (OUTPATIENT)
Dept: SURGERY | Facility: HOSPITAL | Age: 72
DRG: 329 | End: 2017-03-31
Payer: MEDICARE

## 2017-03-31 LAB
ALBUMIN SERPL BCP-MCNC: 3.5 G/DL
ALP SERPL-CCNC: 52 U/L
ALT SERPL W/O P-5'-P-CCNC: 24 U/L
ANION GAP SERPL CALC-SCNC: 9 MMOL/L
AST SERPL-CCNC: 27 U/L
BASOPHILS # BLD AUTO: 0.02 K/UL
BASOPHILS NFR BLD: 0.3 %
BILIRUB SERPL-MCNC: 0.9 MG/DL
BILIRUB UR QL STRIP: NEGATIVE
BUN SERPL-MCNC: 15 MG/DL
CALCIUM SERPL-MCNC: 9.2 MG/DL
CHLORIDE SERPL-SCNC: 103 MMOL/L
CLARITY UR REFRACT.AUTO: CLEAR
CO2 SERPL-SCNC: 24 MMOL/L
COLOR UR AUTO: YELLOW
CREAT SERPL-MCNC: 1 MG/DL
DIFFERENTIAL METHOD: ABNORMAL
EOSINOPHIL # BLD AUTO: 0.4 K/UL
EOSINOPHIL NFR BLD: 5.7 %
ERYTHROCYTE [DISTWIDTH] IN BLOOD BY AUTOMATED COUNT: 12.6 %
EST. GFR  (AFRICAN AMERICAN): >60 ML/MIN/1.73 M^2
EST. GFR  (NON AFRICAN AMERICAN): 55.6 ML/MIN/1.73 M^2
ESTIMATED AVG GLUCOSE: 143 MG/DL
GLUCOSE SERPL-MCNC: 131 MG/DL
GLUCOSE UR QL STRIP: NEGATIVE
HBA1C MFR BLD HPLC: 6.6 %
HCT VFR BLD AUTO: 38.8 %
HGB BLD-MCNC: 13.2 G/DL
HGB UR QL STRIP: NEGATIVE
KETONES UR QL STRIP: NEGATIVE
LEUKOCYTE ESTERASE UR QL STRIP: NEGATIVE
LYMPHOCYTES # BLD AUTO: 2.2 K/UL
LYMPHOCYTES NFR BLD: 32.9 %
MAGNESIUM SERPL-MCNC: 1.6 MG/DL
MCH RBC QN AUTO: 30.6 PG
MCHC RBC AUTO-ENTMCNC: 34 %
MCV RBC AUTO: 90 FL
MONOCYTES # BLD AUTO: 1 K/UL
MONOCYTES NFR BLD: 14.6 %
NEUTROPHILS # BLD AUTO: 3.1 K/UL
NEUTROPHILS NFR BLD: 46.4 %
NITRITE UR QL STRIP: NEGATIVE
PH UR STRIP: 5 [PH] (ref 5–8)
PHOSPHATE SERPL-MCNC: 4.3 MG/DL
PLATELET # BLD AUTO: 120 K/UL
PMV BLD AUTO: 11.8 FL
POCT GLUCOSE: 118 MG/DL (ref 70–110)
POCT GLUCOSE: 68 MG/DL (ref 70–110)
POCT GLUCOSE: 94 MG/DL (ref 70–110)
POTASSIUM SERPL-SCNC: 4.3 MMOL/L
PROT SERPL-MCNC: 6.7 G/DL
PROT UR QL STRIP: NEGATIVE
RBC # BLD AUTO: 4.31 M/UL
SODIUM SERPL-SCNC: 136 MMOL/L
SP GR UR STRIP: >1.03 (ref 1–1.03)
URN SPEC COLLECT METH UR: ABNORMAL
UROBILINOGEN UR STRIP-ACNC: NEGATIVE EU/DL
WBC # BLD AUTO: 6.72 K/UL

## 2017-03-31 PROCEDURE — 83735 ASSAY OF MAGNESIUM: CPT

## 2017-03-31 PROCEDURE — 11000001 HC ACUTE MED/SURG PRIVATE ROOM

## 2017-03-31 PROCEDURE — 81003 URINALYSIS AUTO W/O SCOPE: CPT

## 2017-03-31 PROCEDURE — 25000003 PHARM REV CODE 250: Performed by: SURGERY

## 2017-03-31 PROCEDURE — 36415 COLL VENOUS BLD VENIPUNCTURE: CPT

## 2017-03-31 PROCEDURE — 99223 1ST HOSP IP/OBS HIGH 75: CPT | Mod: AI,GC,, | Performed by: SURGERY

## 2017-03-31 PROCEDURE — 63600175 PHARM REV CODE 636 W HCPCS: Performed by: SURGERY

## 2017-03-31 PROCEDURE — 85025 COMPLETE CBC W/AUTO DIFF WBC: CPT

## 2017-03-31 PROCEDURE — 84100 ASSAY OF PHOSPHORUS: CPT

## 2017-03-31 PROCEDURE — 80053 COMPREHEN METABOLIC PANEL: CPT

## 2017-03-31 PROCEDURE — 94760 N-INVAS EAR/PLS OXIMETRY 1: CPT

## 2017-03-31 PROCEDURE — 25000003 PHARM REV CODE 250

## 2017-03-31 PROCEDURE — C9113 INJ PANTOPRAZOLE SODIUM, VIA: HCPCS | Performed by: SURGERY

## 2017-03-31 PROCEDURE — 25000003 PHARM REV CODE 250: Performed by: STUDENT IN AN ORGANIZED HEALTH CARE EDUCATION/TRAINING PROGRAM

## 2017-03-31 RX ORDER — ENOXAPARIN SODIUM 100 MG/ML
40 INJECTION SUBCUTANEOUS EVERY 24 HOURS
Status: DISCONTINUED | OUTPATIENT
Start: 2017-03-31 | End: 2017-04-01 | Stop reason: SDUPTHER

## 2017-03-31 RX ORDER — DEXTROSE MONOHYDRATE 50 MG/ML
INJECTION, SOLUTION INTRAVENOUS CONTINUOUS
Status: DISCONTINUED | OUTPATIENT
Start: 2017-03-31 | End: 2017-04-01

## 2017-03-31 RX ORDER — LANOLIN ALCOHOL/MO/W.PET/CERES
400 CREAM (GRAM) TOPICAL 2 TIMES DAILY
Status: DISCONTINUED | OUTPATIENT
Start: 2017-03-31 | End: 2017-03-31

## 2017-03-31 RX ORDER — PANTOPRAZOLE SODIUM 40 MG/10ML
40 INJECTION, POWDER, LYOPHILIZED, FOR SOLUTION INTRAVENOUS DAILY
Status: DISCONTINUED | OUTPATIENT
Start: 2017-03-31 | End: 2017-04-15

## 2017-03-31 RX ORDER — HYDROCODONE BITARTRATE AND ACETAMINOPHEN 500; 5 MG/1; MG/1
TABLET ORAL
Status: DISCONTINUED | OUTPATIENT
Start: 2017-03-31 | End: 2017-04-01

## 2017-03-31 RX ORDER — SIMETHICONE 80 MG
1 TABLET,CHEWABLE ORAL 3 TIMES DAILY
Status: DISCONTINUED | OUTPATIENT
Start: 2017-03-31 | End: 2017-04-01

## 2017-03-31 RX ORDER — SODIUM CHLORIDE, SODIUM LACTATE, POTASSIUM CHLORIDE, CALCIUM CHLORIDE 600; 310; 30; 20 MG/100ML; MG/100ML; MG/100ML; MG/100ML
INJECTION, SOLUTION INTRAVENOUS CONTINUOUS
Status: DISCONTINUED | OUTPATIENT
Start: 2017-03-31 | End: 2017-04-06

## 2017-03-31 RX ADMIN — Medication 3 ML: at 05:03

## 2017-03-31 RX ADMIN — Medication 3 ML: at 02:03

## 2017-03-31 RX ADMIN — MORPHINE SULFATE 2 MG: 2 INJECTION, SOLUTION INTRAMUSCULAR; INTRAVENOUS at 08:03

## 2017-03-31 RX ADMIN — DEXTROSE 1000 ML: 5 SOLUTION INTRAVENOUS at 07:03

## 2017-03-31 RX ADMIN — Medication 3 ML: at 09:03

## 2017-03-31 RX ADMIN — MAGNESIUM OXIDE TAB 400 MG (241.3 MG ELEMENTAL MG) 400 MG: 400 (241.3 MG) TAB at 12:03

## 2017-03-31 RX ADMIN — MORPHINE SULFATE 2 MG: 2 INJECTION, SOLUTION INTRAMUSCULAR; INTRAVENOUS at 06:03

## 2017-03-31 RX ADMIN — SODIUM CHLORIDE, SODIUM LACTATE, POTASSIUM CHLORIDE, AND CALCIUM CHLORIDE: 600; 310; 30; 20 INJECTION, SOLUTION INTRAVENOUS at 08:03

## 2017-03-31 RX ADMIN — MORPHINE SULFATE 2 MG: 2 INJECTION, SOLUTION INTRAMUSCULAR; INTRAVENOUS at 04:03

## 2017-03-31 RX ADMIN — Medication 1 CAPSULE: at 12:03

## 2017-03-31 RX ADMIN — SIMETHICONE CHEW TAB 80 MG 80 MG: 80 TABLET ORAL at 12:03

## 2017-03-31 RX ADMIN — ONDANSETRON 4 MG: 2 INJECTION INTRAMUSCULAR; INTRAVENOUS at 01:03

## 2017-03-31 RX ADMIN — DEXTROSE MONOHYDRATE 12.5 G: 25 INJECTION, SOLUTION INTRAVENOUS at 06:03

## 2017-03-31 RX ADMIN — MORPHINE SULFATE 2 MG: 2 INJECTION, SOLUTION INTRAMUSCULAR; INTRAVENOUS at 01:03

## 2017-03-31 RX ADMIN — PANTOPRAZOLE SODIUM 40 MG: 40 INJECTION, POWDER, FOR SOLUTION INTRAVENOUS at 08:03

## 2017-03-31 RX ADMIN — MORPHINE SULFATE 2 MG: 2 INJECTION, SOLUTION INTRAMUSCULAR; INTRAVENOUS at 10:03

## 2017-03-31 RX ADMIN — SIMETHICONE CHEW TAB 80 MG 80 MG: 80 TABLET ORAL at 09:03

## 2017-03-31 RX ADMIN — SODIUM CHLORIDE 1000 ML: 0.9 INJECTION, SOLUTION INTRAVENOUS at 01:03

## 2017-03-31 RX ADMIN — ONDANSETRON 4 MG: 2 INJECTION INTRAMUSCULAR; INTRAVENOUS at 05:03

## 2017-03-31 RX ADMIN — PROMETHAZINE HYDROCHLORIDE 6.25 MG: 25 INJECTION, SOLUTION INTRAMUSCULAR; INTRAVENOUS at 01:03

## 2017-03-31 NOTE — ED PROVIDER NOTES
"Encounter Date: 3/30/2017    SCRIBE #1 NOTE: I, Coral Levy , am scribing for, and in the presence of,  Dr. Garcia . I have scribed the following portions of the note - the EKG reading.       History     Chief Complaint   Patient presents with    Vomiting     dark in color (black)      Review of patient's allergies indicates:   Allergen Reactions    Codeine Nausea And Vomiting     Other reaction(s): Itching    Percocet  [oxycodone-acetaminophen]      Other reaction(s): Itching    Sulfa (sulfonamide antibiotics)      Other reaction(s): Nausea  Other reaction(s): Itching    Adhesive Rash     HPI Comments: Ms Judd presents with abdominal discomfort. She has a history of remote colon cancer, colon resection, SBO 2/2 adhesions, and DM. She states she began to have trouble passing a BM on Tuesday and so took a ducolax, which normally relieves the problem for her. However, she has not had a BM since taking the ducolax and she has had increased abdominal cramping in her lower quadrants. She also is not passing gas. She has had multiple episodes of emesis, including one yesterday where she thew up her meal from 24 hours prior (and recognized the food) and one after that where she threw up "pure black". She denies blood in the emesis. She denies chest pain, SOB, presyncope or syncope. She denies epigastric pain. She denies fevers or chills. She is also on augmentin for a sinus infection.     The history is provided by the patient, medical records and a relative.     Past Medical History:   Diagnosis Date    Allergic rhinitis     Arthritis     Blood transfusion     during delivery and     Bowel obstruction     Cervical radiculopathy     followed by dr cloud    Colon cancer     Diabetes mellitus     Diarrhea     Family history of breast cancer     Family history of colon cancer     Fatty liver     GERD (gastroesophageal reflux disease)     History of shingles     Hyperlipidemia     " Hypothyroidism     Irritable bowel syndrome     Microscopic colitis     treated 2013    Raynaud phenomenon     Raynaud's disease      Past Surgical History:   Procedure Laterality Date    APPENDECTOMY      BACK SURGERY      CARPAL TUNNEL RELEASE      bilateral      SECTION      CHOLECYSTECTOMY      CHOLECYSTECTOMY      CHOLECYSTECTOMY  1965    COLECTOMY      tumor removal 201    HYSTERECTOMY      posterolateral fusion with autograft bone and [place] mineralized bone matrix  13    at [facility] for lumbar spine stenosis    TOE SURGERY      TONSILLECTOMY      TRIGGER FINGER RELEASE       Family History   Problem Relation Age of Onset    Heart disease Father 50     Mi age 50    Colon cancer Father     Cancer Father      colon cancer    Bladder Cancer Mother      non smoker    Cataracts Mother     Glaucoma Mother     Heart disease Mother     Hyperlipidemia Mother     Kidney disease Mother     Breast cancer Sister 79    Arthritis Daughter     Asthma Daughter     Depression Daughter     Hypertension Daughter     Stroke Daughter 40    Arthritis Brother     Colon cancer Brother     Cancer Brother 70     colon cancer    Alcohol abuse Brother     Parkinsonism Brother     Alcohol abuse Brother     Depression Daughter     Celiac disease Neg Hx     Cirrhosis Neg Hx     Colon polyps Neg Hx     Crohn's disease Neg Hx     Cystic fibrosis Neg Hx     Esophageal cancer Neg Hx     Hemochromatosis Neg Hx     Inflammatory bowel disease Neg Hx     Irritable bowel syndrome Neg Hx     Liver cancer Neg Hx     Liver disease Neg Hx     Rectal cancer Neg Hx     Stomach cancer Neg Hx     Ulcerative colitis Neg Hx     Eliazar's disease Neg Hx     Amblyopia Neg Hx     Blindness Neg Hx     Macular degeneration Neg Hx     Retinal detachment Neg Hx     Strabismus Neg Hx     Melanoma Neg Hx      Social History   Substance Use Topics    Smoking status: Never Smoker    Smokeless tobacco:  Never Used    Alcohol use 3.0 oz/week     5 Glasses of wine per week      Comment: socially     Review of Systems   Constitutional: Negative for fever.   HENT: Negative for sore throat.    Respiratory: Negative for cough.    Cardiovascular: Negative for chest pain.   Gastrointestinal: Positive for abdominal distention, abdominal pain, constipation, nausea and vomiting.   Genitourinary: Negative for dysuria and hematuria.   Musculoskeletal: Negative for back pain.   Skin: Negative for rash.   Neurological: Negative for weakness.   Hematological: Does not bruise/bleed easily.       Physical Exam   Initial Vitals   BP Pulse Resp Temp SpO2   03/30/17 1819 03/30/17 1819 03/30/17 1819 03/30/17 1819 03/30/17 1819   138/74 86 18 97.7 °F (36.5 °C) 95 %     Physical Exam    Nursing note and vitals reviewed.  Constitutional: She appears well-developed and well-nourished. No distress.   Eyes: Conjunctivae are normal. Pupils are equal, round, and reactive to light.   Neck: Normal range of motion.   Cardiovascular: Normal rate and regular rhythm.   Pulmonary/Chest: Breath sounds normal. No respiratory distress.   Abdominal: She exhibits distension (tense). There is tenderness (mild lower quadrant). There is no rebound.   Multiple well healed surgical scars  Minimal to absent bowel sounds.    Genitourinary: Rectal exam shows guaiac negative stool. Guaiac negative stool.   Musculoskeletal: Normal range of motion. She exhibits no edema.   Neurological: She is oriented to person, place, and time.   Skin: Skin is warm and dry.   Some sunburn to upper arms, peeling         ED Course   Procedures  Labs Reviewed   CBC W/ AUTO DIFFERENTIAL - Abnormal; Notable for the following:        Result Value    MCH 31.7 (*)     Platelets 121 (*)     Mono # 1.1 (*)     All other components within normal limits   COMPREHENSIVE METABOLIC PANEL - Abnormal; Notable for the following:     Glucose 153 (*)     Total Bilirubin 1.1 (*)     eGFR if   American 57.2 (*)     eGFR if non  49.6 (*)     All other components within normal limits   PROTIME-INR   TYPE & SCREEN   POCT GLUCOSE   POCT GLUCOSE MONITORING CONTINUOUS     EKG Readings: (Independently Interpreted)   NSR. No significant ST abnormalities.           Medical Decision Making:   History:   Old Medical Records: I decided to obtain old medical records.  Initial Assessment:   Ms Judd is a 72F with PMH SBO 2/2 adhesion, remote colon cancer, DM who presents with abdominal distension/discomfort and inability to have BM. She has had several episodes of emesis. Exam shows a distended, mildly tender abdomen in the bilat lower quadrants with multiple well healed surgical scars and minimal bowel sounds.   Differential Diagnosis:   Most likely is SBO v ileus v constipation, also to be considered is upper gi bleed. Other possibilities include infectious process, diverticulitis, GERD.   Clinical Tests:   Lab Tests: Ordered  Radiological Study: Ordered  Medical Tests: Ordered  ED Management:  Patient found to have SBO on CT scan. Consulted to surgery.     I attempted to page Dr Mc twice, once before speaking with the surgical team and once after.   Other:   I have discussed this case with another health care provider.       <> Summary of the Discussion: Staffed with Dr Radha Hogue Attestation:   Roseibluci #1: I performed the above scribed service and the documentation accurately describes the services I performed. I attest to the accuracy of the note.    Attending Attestation:   Physician Attestation Statement for Resident:  As the supervising MD   Physician Attestation Statement: I have personally seen and examined this patient.   I agree with the above history. -: 71 yo f, h/o colon CA, SBO, DM, here with abd cramping x 2 days, constipation, intermittent vomiting, vomiting dark but no dark stools/BRBPR.  abd plain film yesterday negative.  Pt on augmentin for UTI.  Pt comfortable  on exam but + abd tenderness and mild distention.  ? Ileus vs recurrent SBO or cancer.  Will get labs, CT abd/pelvis.  Also need to check rectal exam to r/o GI bleed   As the supervising MD I agree with the above PE.    As the supervising MD I agree with the above treatment, course, plan, and disposition.  I have reviewed and agree with the residents interpretation of the following: lab data, CT scans and x-rays.  I have reviewed the following: old records at this facility.          Physician Attestation for Scribe:  Physician Attestation Statement for Scribe #1: I, Dr. Garcia , reviewed documentation, as scribed by Coral Levy  in my presence, and it is both accurate and complete.                 ED Course     Clinical Impression:   The encounter diagnosis was SBO (small bowel obstruction).          Mariajose Garcia MD  03/31/17 5505

## 2017-03-31 NOTE — ED NOTES
"LOC: The patient is awake, alert and aware of environment with an appropriate affect, the patient is oriented x 3 and speaking appropriately.  APPEARANCE: Patient resting comfortably and in no acute distress, patient is clean and well groomed  SKIN: The skin is warm and dry, color consistent with ethnicity, patient has normal skin turgor and moist mucus membranes, skin intact, no breakdown or brusing noted.  MUSCULOSKELETAL: Patient moving all extremities well, no obvious swelling or deformities noted.   RESPIRATORY: Airway is open and patent, breath sounds clear throughout all lung fields; respirations are spontaneous, patient has a normal effort and rate, no accessory muscle use noted. Pt reports intermittent SOB. Pt 96% on RA.  CARDIAC: Patient has no peripheral edema noted, capillary refill < 3 seconds. No complaints of chest pain   ABDOMEN: Soft and tender to palpation, distention noted. Bowel sounds present x 4. Pt reports black emesis and abd spasms and pain to R/LLQ "in my colon".  NEUROLOGIC: PERRL, 3mm bilaterally, eyes open spontaneously, behavior appropriate to situation, follows commands, facial expression symmetrical, bilateral hand grasp equal and even, purposeful motor response noted, normal sensation in all extremities when touched with a finger.    "

## 2017-03-31 NOTE — CONSULTS
[facility]  General Surgery  Consult Note    Consults  Subjective:     Chief Complaint/Reason for Admission: SBO    History of Present Illness: This is a 72 year old female with history of colon cancer s/p partial colectomy in the past, and a prior episode of SBO that required resection of small bowel. Who presents with 2 days history of obstipation, constipation and nausea and inability to tolerate po. She states she has abdominal distension and cramping pain that is similar to her prior bowel obstruction. She came to [facility] for further evaluation. A CT scan was obtained that did reveal small bowel obstruction.     Current Facility-Administered Medications on File Prior to Encounter   Medication    cyanocobalamin injection 1,000 mcg    cyanocobalamin injection 1,000 mcg     Current Outpatient Prescriptions on File Prior to Encounter   Medication Sig    atorvastatin (LIPITOR) 40 MG tablet take 1 tablet by mouth once daily    azelastine (ASTELIN) 137 mcg nasal spray 1 spray (137 mcg total) by Nasal route 2 (two) times daily.    blood sugar diagnostic Strp Dispense YOGITECH contour strips; test blood sugar once daily    blood-glucose meter (CONTOUR METER) kit Please dispense HeadMix Contour meter and supplies Use as instructed Test Blood sugar once daily    ciprofloxacin HCl (CIPRO) 250 MG tablet     conjugated estrogens (PREMARIN) vaginal cream Place 0.5 g vaginally twice a week.    fluticasone (FLONASE) 50 mcg/actuation nasal spray 2 sprays by Each Nare route once daily.    DARNELL ROOT (DARNELL EXTRACT ORAL) Take by mouth.    hydrocortisone 2.5 % cream     ipratropium (ATROVENT) 0.06 % nasal spray     lancets (ONE TOUCH ULTRASOFT LANCETS) Misc Test blood sugar once daily    levothyroxine (SYNTHROID) 88 MCG tablet Take 1 tablet (88 mcg total) by mouth before breakfast. 1 Tablet Oral Every morning    LYRICA 100 mg capsule take 1 capsule by mouth twice a day    magnesium 30 mg Tab Take 500  capsules by mouth. Patient's taking magnesium 500mg QD    meloxicam (MOBIC) 15 MG tablet Take 1 tablet (15 mg total) by mouth daily as needed for Pain. With food for arthritis    metformin (GLUCOPHAGE-XR) 750 MG 24 hr tablet Take 1 tablet (750 mg total) by mouth 2 (two) times daily with meals.    ondansetron (ZOFRAN) 8 MG tablet Take 1 tablet (8 mg total) by mouth every 8 (eight) hours as needed for Nausea.    ospemifene (OSPHENA) 60 mg Tab Take 60 mg by mouth once daily.    tramadol (ULTRAM) 50 mg tablet Take 50 mg by mouth every 12 (twelve) hours.    turmeric root extract 500 mg Cap Take by mouth.    vitamin D 1000 units Tab Take 185 mg by mouth once daily. D3       Review of patient's allergies indicates:   Allergen Reactions    Codeine Nausea And Vomiting     Other reaction(s): Itching    Percocet  [oxycodone-acetaminophen]      Other reaction(s): Itching    Sulfa (sulfonamide antibiotics)      Other reaction(s): Nausea  Other reaction(s): Itching    Adhesive Rash       Past Medical History:   Diagnosis Date    Allergic rhinitis     Arthritis     Blood transfusion     during delivery and     Bowel obstruction     Cervical radiculopathy     followed by dr cloud    Colon cancer     Diabetes mellitus     Diarrhea     Family history of breast cancer     Family history of colon cancer     Fatty liver     GERD (gastroesophageal reflux disease)     History of shingles     Hyperlipidemia     Hypothyroidism     Irritable bowel syndrome     Microscopic colitis     treated 2013    Raynaud phenomenon     Raynaud's disease      Past Surgical History:   Procedure Laterality Date    APPENDECTOMY      BACK SURGERY      CARPAL TUNNEL RELEASE      bilateral      SECTION      CHOLECYSTECTOMY      CHOLECYSTECTOMY      COLECTOMY      tumor removal 201    HYSTERECTOMY      posterolateral fusion with autograft bone and [place] mineralized bone matrix  13    at [facility] for  lumbar spine stenosis    TOE SURGERY      TONSILLECTOMY      TRIGGER FINGER RELEASE       Family History     Problem Relation (Age of Onset)    Alcohol abuse Brother, Brother    Arthritis Daughter, Brother    Asthma Daughter    Bladder Cancer Mother    Breast cancer Sister (79)    Cancer Father, Brother (70)    Cataracts Mother    Colon cancer Father, Brother    Depression Daughter, Daughter    Glaucoma Mother    Heart disease Father (50), Mother    Hyperlipidemia Mother    Hypertension Daughter    Kidney disease Mother    Parkinsonism Brother    Stroke Daughter (40)        Social History Main Topics    Smoking status: Never Smoker    Smokeless tobacco: Never Used    Alcohol use 3.0 oz/week     5 Glasses of wine per week      Comment: socially    Drug use: No    Sexual activity: No     Review of Systems   Constitutional: Positive for appetite change. Negative for activity change, fatigue and unexpected weight change.   Respiratory: Negative for cough and choking.    Cardiovascular: Negative for palpitations and leg swelling.   Gastrointestinal: Positive for abdominal distention, constipation, nausea and vomiting.   Genitourinary: Negative for difficulty urinating.   Musculoskeletal: Negative for back pain.     Objective:     Vital Signs (Most Recent):  Temp: 97.7 °F (36.5 °C) (03/30/17 1819)  Pulse: 76 (03/30/17 2132)  Resp: (!) 21 (03/30/17 2115)  BP: 136/78 (03/30/17 2132)  SpO2: 97 % (03/30/17 2132) Vital Signs (24h Range):  Temp:  [97.7 °F (36.5 °C)] 97.7 °F (36.5 °C)  Pulse:  [75-86] 76  Resp:  [18-21] 21  SpO2:  [95 %-97 %] 97 %  BP: (131-138)/(66-78) 136/78     Weight: 69.9 kg (154 lb)  Body mass index is 26.43 kg/(m^2).    No intake or output data in the 24 hours ending 03/30/17 2216    Physical Exam   Constitutional: She is oriented to person, place, and time. She appears well-developed and well-nourished. No distress.   HENT:   Head: Normocephalic and atraumatic.   Eyes: Conjunctivae are normal.  Right eye exhibits no discharge. Left eye exhibits no discharge. No scleral icterus.   Neck: Normal range of motion. Neck supple. No JVD present. No tracheal deviation present.   Cardiovascular: Normal rate and regular rhythm.    Pulmonary/Chest: Effort normal. No respiratory distress.   Abdominal: Soft. She exhibits no distension and no mass. There is tenderness. There is no guarding. No hernia.   Neurological: She is alert and oriented to person, place, and time.   Skin: Skin is warm and dry. No rash noted. She is not diaphoretic. No erythema.       Significant Labs:  CBC:   Recent Labs  Lab 03/30/17 1934   WBC 9.63   RBC 4.96   HGB 15.7   HCT 45.5   *   MCV 92   MCH 31.7*   MCHC 34.5     CMP:   Recent Labs  Lab 03/30/17 1934   *   CALCIUM 10.3   ALBUMIN 4.1   PROT 8.1      K 4.2   CO2 23      BUN 14   CREATININE 1.1   ALKPHOS 56   ALT 23   AST 24   BILITOT 1.1*       Significant Diagnostics:  CT: I have reviewed all pertinent results/findings within the past 24 hours.      Assessment/Plan:     Active Diagnoses:    Diagnosis Date Noted POA    SBO (small bowel obstruction) [K56.69] 03/30/2017 Yes      Problems Resolved During this Admission:    Diagnosis Date Noted Date Resolved POA     Admit to general surgery  Place NGT LIWS  NPO  IVF  Repeat labs in AM  Serial abdominal exams    Thank you for your consult. patient to be admitted to general surgery    Kun Del Cid MD  General Surgery  [facility]

## 2017-03-31 NOTE — PROGRESS NOTES
SUBJECTIVE:  Pt seen and examined. NG tube in place, dark output in tube. Pain improved. Admitted for SBO; h/o multiple colon resections and SBR for previous SBO.       OBJECTIVE:  Temp:  [97.7 °F (36.5 °C)-98.3 °F (36.8 °C)]   Pulse:  [69-86]   Resp:  [17-21]   BP: (126-145)/(61-78)   SpO2:  [93 %-97 %]    Nad, aaox3  rrr  Cta bilaterally   abd- soft, non-distended, BS+ but hypoactive. Previous scar in midline well healed.   Extremities- minimal edema    I & O (Last 24H):  Intake/Output Summary (Last 24 hours) at 03/31/17 0725  Last data filed at 03/31/17 0631   Gross per 24 hour   Intake           968.75 ml   Output                0 ml   Net           968.75 ml       Lab Results   Component Value Date    WBC 6.72 03/31/2017    HGB 13.2 03/31/2017    HCT 38.8 03/31/2017    MCV 90 03/31/2017     (L) 03/31/2017     Lab Results   Component Value Date    CREATININE 1.0 03/31/2017    BUN 15 03/31/2017     03/31/2017    K 4.3 03/31/2017     03/31/2017    CO2 24 03/31/2017    CALCIUM 9.2 03/31/2017    PHOS 4.3 03/31/2017    MG 1.6 03/31/2017       ASSESSMENT/PLAN:   Anayeli Judd is a 72 y.o. female with h/o colon resections for ca and SBR for SBO; presenting with abd pain, n/v and SBO.     -pt is DNR; willing to suspend for surgery  -discuss possible UGI  -NPO, IVF  -oobtc, ambulation  -NG to LIWS  -protonix and zantac for reflux  -serial abdominal exams    Hung Cunningham PGY5

## 2017-03-31 NOTE — ED TRIAGE NOTES
"Pt presents with c/o dark emesis. Pt has hx of colon CA and states she took a dulcolax with severe abd pain and spasms. Pt states she was seen for a wellness check up and had and x-ray of her abd and was told it was fine. Pt reports vomiting last night and states she was able to tell what is was that she ate 24 hours prior. Pt states she vomited today and "it was all black". Denies passing gas, states last normal BM was 2 days ago, reports having a very small BM after taking the dulcolax. Pt states if she does not take a laxative she can go  A week w/o a BM. Pt states she has a hx of bowel obstruction.   "

## 2017-03-31 NOTE — PLAN OF CARE
Patient lives alone in a 7th floor condominium w/elevator access. Daughter, Kimberly, at . Not medically stable for discharge;Conservative management for SBO:NPO/NGT LIWS/IVF. Currently: no needs determined. CM will continue to follow.        03/31/17 1220   Discharge Assessment   Assessment Type Discharge Planning Assessment   Confirmed/corrected address and phone number on facesheet? Yes   Assessment information obtained from? Patient;Medical Record   Expected Length of Stay (days) (2-5+)   Communicated expected length of stay with patient/caregiver yes   Type of Healthcare Directive Received (Unknown)   Prior to hospitilization cognitive status: Alert/Oriented;No Deficits   Prior to hospitalization functional status: Independent   Current cognitive status: Alert/Oriented;No Deficits   Current Functional Status: Independent;Needs Assistance   Arrived From home or self-care  (Via ER)   Lives With alone   Able to Return to Prior Arrangements yes   Is patient able to care for self after discharge? Yes   How many people do you have in your home that can help with your care after discharge? 3   Who are your caregiver(s) and their phone number(s)? (Sitter available as needs, Does not live w/her: 2 daughters available as needs: 1. Kimberly C [phone], 2. Danielle C [phone]. )   Patient's perception of discharge disposition home or selfcare   Readmission Within The Last 30 Days no previous admission in last 30 days   Patient currently being followed by outpatient case management? No   Patient currently receives home health services? No   Does the patient currently use HME? No   Patient currently receives private duty nursing? N/A   Patient currently receives any other outside agency services? No   Equipment Currently Used at Home none   Do you have any problems affording any of your prescribed medications? No   Is the patient taking medications as prescribed? yes   Do you have any financial concerns preventing  you from receiving the healthcare you need? No   Does the patient have transportation to healthcare appointments? Yes   Transportation Available car;family or friend will provide   On Dialysis? No   Does the patient receive services at the Coumadin Clinic? No   Are there any open cases? No   Discharge Plan A Home  (Can make arrangents for personal sitter, 2 daughters available as needs. )   Discharge Plan B Home;Home Health  (as above/needs TBD/?HH)   Patient/Family In Agreement With Plan unable to assess

## 2017-04-01 ENCOUNTER — ANESTHESIA (OUTPATIENT)
Dept: SURGERY | Facility: HOSPITAL | Age: 72
DRG: 329 | End: 2017-04-01
Payer: MEDICARE

## 2017-04-01 LAB
ALBUMIN SERPL BCP-MCNC: 3.2 G/DL
ALP SERPL-CCNC: 43 U/L
ALT SERPL W/O P-5'-P-CCNC: 17 U/L
ANION GAP SERPL CALC-SCNC: 9 MMOL/L
AST SERPL-CCNC: 22 U/L
BASOPHILS # BLD AUTO: 0.02 K/UL
BASOPHILS NFR BLD: 0.7 %
BILIRUB SERPL-MCNC: 1 MG/DL
BUN SERPL-MCNC: 9 MG/DL
CALCIUM SERPL-MCNC: 9.1 MG/DL
CHLORIDE SERPL-SCNC: 105 MMOL/L
CO2 SERPL-SCNC: 23 MMOL/L
CREAT SERPL-MCNC: 0.8 MG/DL
DIFFERENTIAL METHOD: ABNORMAL
EOSINOPHIL # BLD AUTO: 0.2 K/UL
EOSINOPHIL NFR BLD: 7 %
ERYTHROCYTE [DISTWIDTH] IN BLOOD BY AUTOMATED COUNT: 12.5 %
EST. GFR  (AFRICAN AMERICAN): >60 ML/MIN/1.73 M^2
EST. GFR  (NON AFRICAN AMERICAN): >60 ML/MIN/1.73 M^2
GLUCOSE SERPL-MCNC: 112 MG/DL
HCT VFR BLD AUTO: 37.9 %
HGB BLD-MCNC: 12.9 G/DL
INR PPP: 1
LACTATE SERPL-SCNC: 1.3 MMOL/L
LYMPHOCYTES # BLD AUTO: 1.2 K/UL
LYMPHOCYTES NFR BLD: 41.5 %
MCH RBC QN AUTO: 31.2 PG
MCHC RBC AUTO-ENTMCNC: 34 %
MCV RBC AUTO: 92 FL
MONOCYTES # BLD AUTO: 0.5 K/UL
MONOCYTES NFR BLD: 18.1 %
NEUTROPHILS # BLD AUTO: 1 K/UL
NEUTROPHILS NFR BLD: 32.7 %
PLATELET # BLD AUTO: 68 K/UL
PLATELET BLD QL SMEAR: ABNORMAL
PMV BLD AUTO: 11.5 FL
POCT GLUCOSE: 101 MG/DL (ref 70–110)
POCT GLUCOSE: 119 MG/DL (ref 70–110)
POCT GLUCOSE: 206 MG/DL (ref 70–110)
POTASSIUM SERPL-SCNC: 4.3 MMOL/L
PROT SERPL-MCNC: 6.1 G/DL
PROTHROMBIN TIME: 10.7 SEC
RBC # BLD AUTO: 4.14 M/UL
SODIUM SERPL-SCNC: 137 MMOL/L
WBC # BLD AUTO: 2.99 K/UL

## 2017-04-01 PROCEDURE — 63600175 PHARM REV CODE 636 W HCPCS: Performed by: SURGERY

## 2017-04-01 PROCEDURE — 25000003 PHARM REV CODE 250: Performed by: SURGERY

## 2017-04-01 PROCEDURE — 80053 COMPREHEN METABOLIC PANEL: CPT

## 2017-04-01 PROCEDURE — 85610 PROTHROMBIN TIME: CPT

## 2017-04-01 PROCEDURE — D9220A PRA ANESTHESIA: Mod: ANES,,, | Performed by: ANESTHESIOLOGY

## 2017-04-01 PROCEDURE — 63600175 PHARM REV CODE 636 W HCPCS: Performed by: NURSE ANESTHETIST, CERTIFIED REGISTERED

## 2017-04-01 PROCEDURE — 20600001 HC STEP DOWN PRIVATE ROOM

## 2017-04-01 PROCEDURE — 0DN80ZZ RELEASE SMALL INTESTINE, OPEN APPROACH: ICD-10-PCS | Performed by: SURGERY

## 2017-04-01 PROCEDURE — 27000221 HC OXYGEN, UP TO 24 HOURS

## 2017-04-01 PROCEDURE — 0DQ80ZZ REPAIR SMALL INTESTINE, OPEN APPROACH: ICD-10-PCS | Performed by: SURGERY

## 2017-04-01 PROCEDURE — 85025 COMPLETE CBC W/AUTO DIFF WBC: CPT

## 2017-04-01 PROCEDURE — 36000706: Performed by: SURGERY

## 2017-04-01 PROCEDURE — 36620 INSERTION CATHETER ARTERY: CPT | Mod: 59,,, | Performed by: ANESTHESIOLOGY

## 2017-04-01 PROCEDURE — 36415 COLL VENOUS BLD VENIPUNCTURE: CPT

## 2017-04-01 PROCEDURE — 63600175 PHARM REV CODE 636 W HCPCS

## 2017-04-01 PROCEDURE — D9220A PRA ANESTHESIA: Mod: CRNA,,, | Performed by: NURSE ANESTHETIST, CERTIFIED REGISTERED

## 2017-04-01 PROCEDURE — 94761 N-INVAS EAR/PLS OXIMETRY MLT: CPT

## 2017-04-01 PROCEDURE — 25000003 PHARM REV CODE 250

## 2017-04-01 PROCEDURE — 36000707: Performed by: SURGERY

## 2017-04-01 PROCEDURE — 83605 ASSAY OF LACTIC ACID: CPT

## 2017-04-01 PROCEDURE — 44005 FREEING OF BOWEL ADHESION: CPT | Mod: 22,GC,, | Performed by: SURGERY

## 2017-04-01 PROCEDURE — C9113 INJ PANTOPRAZOLE SODIUM, VIA: HCPCS | Performed by: SURGERY

## 2017-04-01 PROCEDURE — 27100025 HC TUBING, SET FLUID WARMER: Performed by: NURSE ANESTHETIST, CERTIFIED REGISTERED

## 2017-04-01 PROCEDURE — 25000003 PHARM REV CODE 250: Performed by: NURSE ANESTHETIST, CERTIFIED REGISTERED

## 2017-04-01 PROCEDURE — 37000009 HC ANESTHESIA EA ADD 15 MINS: Performed by: SURGERY

## 2017-04-01 PROCEDURE — 25000003 PHARM REV CODE 250: Performed by: STUDENT IN AN ORGANIZED HEALTH CARE EDUCATION/TRAINING PROGRAM

## 2017-04-01 PROCEDURE — 27800505 HC CATH, RADIAL ARTERY KIT: Performed by: NURSE ANESTHETIST, CERTIFIED REGISTERED

## 2017-04-01 PROCEDURE — 71000039 HC RECOVERY, EACH ADD'L HOUR: Performed by: SURGERY

## 2017-04-01 PROCEDURE — 71000033 HC RECOVERY, INTIAL HOUR: Performed by: SURGERY

## 2017-04-01 PROCEDURE — 37000008 HC ANESTHESIA 1ST 15 MINUTES: Performed by: SURGERY

## 2017-04-01 RX ORDER — ROCURONIUM BROMIDE 10 MG/ML
INJECTION, SOLUTION INTRAVENOUS
Status: DISCONTINUED | OUTPATIENT
Start: 2017-04-01 | End: 2017-04-01

## 2017-04-01 RX ORDER — PROPOFOL 10 MG/ML
VIAL (ML) INTRAVENOUS
Status: DISCONTINUED | OUTPATIENT
Start: 2017-04-01 | End: 2017-04-01

## 2017-04-01 RX ORDER — NEOSTIGMINE METHYLSULFATE 1 MG/ML
INJECTION, SOLUTION INTRAVENOUS
Status: DISCONTINUED | OUTPATIENT
Start: 2017-04-01 | End: 2017-04-01

## 2017-04-01 RX ORDER — ENOXAPARIN SODIUM 100 MG/ML
40 INJECTION SUBCUTANEOUS
Status: DISCONTINUED | OUTPATIENT
Start: 2017-04-01 | End: 2017-04-07

## 2017-04-01 RX ORDER — SUCCINYLCHOLINE CHLORIDE 20 MG/ML
INJECTION INTRAMUSCULAR; INTRAVENOUS
Status: DISCONTINUED | OUTPATIENT
Start: 2017-04-01 | End: 2017-04-01

## 2017-04-01 RX ORDER — DEXAMETHASONE SODIUM PHOSPHATE 4 MG/ML
INJECTION, SOLUTION INTRA-ARTICULAR; INTRALESIONAL; INTRAMUSCULAR; INTRAVENOUS; SOFT TISSUE
Status: DISCONTINUED | OUTPATIENT
Start: 2017-04-01 | End: 2017-04-01

## 2017-04-01 RX ORDER — HYDROMORPHONE HYDROCHLORIDE 2 MG/ML
INJECTION, SOLUTION INTRAMUSCULAR; INTRAVENOUS; SUBCUTANEOUS
Status: DISCONTINUED | OUTPATIENT
Start: 2017-04-01 | End: 2017-04-01

## 2017-04-01 RX ORDER — ONDANSETRON 2 MG/ML
4 INJECTION INTRAMUSCULAR; INTRAVENOUS EVERY 8 HOURS PRN
Status: DISCONTINUED | OUTPATIENT
Start: 2017-04-01 | End: 2017-04-20 | Stop reason: HOSPADM

## 2017-04-01 RX ORDER — NALOXONE HCL 0.4 MG/ML
0.02 VIAL (ML) INJECTION
Status: DISCONTINUED | OUTPATIENT
Start: 2017-04-01 | End: 2017-04-12

## 2017-04-01 RX ORDER — HYDROMORPHONE HCL IN 0.9% NACL 6 MG/30 ML
PATIENT CONTROLLED ANALGESIA SYRINGE INTRAVENOUS CONTINUOUS
Status: DISCONTINUED | OUTPATIENT
Start: 2017-04-01 | End: 2017-04-12

## 2017-04-01 RX ORDER — DIPHENHYDRAMINE HYDROCHLORIDE 50 MG/ML
12.5 INJECTION INTRAMUSCULAR; INTRAVENOUS EVERY 6 HOURS PRN
Status: DISCONTINUED | OUTPATIENT
Start: 2017-04-01 | End: 2017-04-20 | Stop reason: HOSPADM

## 2017-04-01 RX ORDER — PHENYLEPHRINE HYDROCHLORIDE 10 MG/ML
INJECTION INTRAVENOUS
Status: DISCONTINUED | OUTPATIENT
Start: 2017-04-01 | End: 2017-04-01

## 2017-04-01 RX ORDER — ONDANSETRON 2 MG/ML
4 INJECTION INTRAMUSCULAR; INTRAVENOUS EVERY 8 HOURS PRN
Status: DISCONTINUED | OUTPATIENT
Start: 2017-04-01 | End: 2017-04-01 | Stop reason: SDUPTHER

## 2017-04-01 RX ORDER — FENTANYL CITRATE 50 UG/ML
INJECTION, SOLUTION INTRAMUSCULAR; INTRAVENOUS
Status: DISCONTINUED | OUTPATIENT
Start: 2017-04-01 | End: 2017-04-01

## 2017-04-01 RX ORDER — ONDANSETRON 2 MG/ML
INJECTION INTRAMUSCULAR; INTRAVENOUS
Status: DISCONTINUED | OUTPATIENT
Start: 2017-04-01 | End: 2017-04-01

## 2017-04-01 RX ORDER — MIDAZOLAM HYDROCHLORIDE 1 MG/ML
INJECTION, SOLUTION INTRAMUSCULAR; INTRAVENOUS
Status: DISCONTINUED | OUTPATIENT
Start: 2017-04-01 | End: 2017-04-01

## 2017-04-01 RX ORDER — GLYCOPYRROLATE 0.2 MG/ML
INJECTION INTRAMUSCULAR; INTRAVENOUS
Status: DISCONTINUED | OUTPATIENT
Start: 2017-04-01 | End: 2017-04-01

## 2017-04-01 RX ORDER — HYDROMORPHONE HCL IN 0.9% NACL 6 MG/30 ML
PATIENT CONTROLLED ANALGESIA SYRINGE INTRAVENOUS
Status: COMPLETED
Start: 2017-04-01 | End: 2017-04-01

## 2017-04-01 RX ORDER — LIDOCAINE HCL/PF 100 MG/5ML
SYRINGE (ML) INTRAVENOUS
Status: DISCONTINUED | OUTPATIENT
Start: 2017-04-01 | End: 2017-04-01

## 2017-04-01 RX ORDER — CEFAZOLIN SODIUM 1 G/3ML
INJECTION, POWDER, FOR SOLUTION INTRAMUSCULAR; INTRAVENOUS
Status: DISCONTINUED | OUTPATIENT
Start: 2017-04-01 | End: 2017-04-01

## 2017-04-01 RX ADMIN — PHENYLEPHRINE HYDROCHLORIDE 100 MCG: 10 INJECTION INTRAVENOUS at 10:04

## 2017-04-01 RX ADMIN — DEXAMETHASONE SODIUM PHOSPHATE 8 MG: 4 INJECTION, SOLUTION INTRAMUSCULAR; INTRAVENOUS at 07:04

## 2017-04-01 RX ADMIN — INSULIN ASPART 2 UNITS: 100 INJECTION, SOLUTION INTRAVENOUS; SUBCUTANEOUS at 06:04

## 2017-04-01 RX ADMIN — MIDAZOLAM HYDROCHLORIDE 1 MG: 1 INJECTION, SOLUTION INTRAMUSCULAR; INTRAVENOUS at 07:04

## 2017-04-01 RX ADMIN — ROCURONIUM BROMIDE 10 MG: 10 INJECTION, SOLUTION INTRAVENOUS at 10:04

## 2017-04-01 RX ADMIN — FENTANYL CITRATE 50 MCG: 50 INJECTION, SOLUTION INTRAMUSCULAR; INTRAVENOUS at 08:04

## 2017-04-01 RX ADMIN — PHENYLEPHRINE HYDROCHLORIDE 100 MCG: 10 INJECTION INTRAVENOUS at 07:04

## 2017-04-01 RX ADMIN — SODIUM CHLORIDE, SODIUM LACTATE, POTASSIUM CHLORIDE, AND CALCIUM CHLORIDE: 600; 310; 30; 20 INJECTION, SOLUTION INTRAVENOUS at 11:04

## 2017-04-01 RX ADMIN — CEFAZOLIN 2 G: 1 INJECTION, POWDER, FOR SOLUTION INTRAVENOUS at 07:04

## 2017-04-01 RX ADMIN — ROCURONIUM BROMIDE 30 MG: 10 INJECTION, SOLUTION INTRAVENOUS at 07:04

## 2017-04-01 RX ADMIN — ROCURONIUM BROMIDE 10 MG: 10 INJECTION, SOLUTION INTRAVENOUS at 08:04

## 2017-04-01 RX ADMIN — HYDROMORPHONE HYDROCHLORIDE 0.2 MG: 2 INJECTION, SOLUTION INTRAMUSCULAR; INTRAVENOUS; SUBCUTANEOUS at 10:04

## 2017-04-01 RX ADMIN — Medication 3 ML: at 10:04

## 2017-04-01 RX ADMIN — SODIUM CHLORIDE, SODIUM GLUCONATE, SODIUM ACETATE, POTASSIUM CHLORIDE, MAGNESIUM CHLORIDE, SODIUM PHOSPHATE, DIBASIC, AND POTASSIUM PHOSPHATE: .53; .5; .37; .037; .03; .012; .00082 INJECTION, SOLUTION INTRAVENOUS at 07:04

## 2017-04-01 RX ADMIN — FENTANYL CITRATE 100 MCG: 50 INJECTION, SOLUTION INTRAMUSCULAR; INTRAVENOUS at 07:04

## 2017-04-01 RX ADMIN — ROCURONIUM BROMIDE 10 MG: 10 INJECTION, SOLUTION INTRAVENOUS at 07:04

## 2017-04-01 RX ADMIN — ONDANSETRON 4 MG: 2 INJECTION INTRAMUSCULAR; INTRAVENOUS at 11:04

## 2017-04-01 RX ADMIN — HYDROMORPHONE HYDROCHLORIDE 0.4 MG: 2 INJECTION, SOLUTION INTRAMUSCULAR; INTRAVENOUS; SUBCUTANEOUS at 10:04

## 2017-04-01 RX ADMIN — SIMETHICONE CHEW TAB 80 MG 80 MG: 80 TABLET ORAL at 05:04

## 2017-04-01 RX ADMIN — ENOXAPARIN SODIUM 40 MG: 100 INJECTION SUBCUTANEOUS at 04:04

## 2017-04-01 RX ADMIN — SODIUM CHLORIDE, SODIUM GLUCONATE, SODIUM ACETATE, POTASSIUM CHLORIDE, MAGNESIUM CHLORIDE, SODIUM PHOSPHATE, DIBASIC, AND POTASSIUM PHOSPHATE: .53; .5; .37; .037; .03; .012; .00082 INJECTION, SOLUTION INTRAVENOUS at 10:04

## 2017-04-01 RX ADMIN — PROMETHAZINE HYDROCHLORIDE 6.25 MG: 25 INJECTION, SOLUTION INTRAMUSCULAR; INTRAVENOUS at 12:04

## 2017-04-01 RX ADMIN — FENTANYL CITRATE 50 MCG: 50 INJECTION, SOLUTION INTRAMUSCULAR; INTRAVENOUS at 09:04

## 2017-04-01 RX ADMIN — Medication 3 ML: at 05:04

## 2017-04-01 RX ADMIN — GLYCOPYRROLATE 0.6 MG: 0.2 INJECTION, SOLUTION INTRAMUSCULAR; INTRAVENOUS at 10:04

## 2017-04-01 RX ADMIN — PANTOPRAZOLE SODIUM 40 MG: 40 INJECTION, POWDER, FOR SOLUTION INTRAVENOUS at 06:04

## 2017-04-01 RX ADMIN — Medication: at 11:04

## 2017-04-01 RX ADMIN — ONDANSETRON 4 MG: 2 INJECTION INTRAMUSCULAR; INTRAVENOUS at 10:04

## 2017-04-01 RX ADMIN — NEOSTIGMINE METHYLSULFATE 5 MG: 1 INJECTION INTRAVENOUS at 10:04

## 2017-04-01 RX ADMIN — PROPOFOL 100 MG: 10 INJECTION, EMULSION INTRAVENOUS at 07:04

## 2017-04-01 RX ADMIN — SUCCINYLCHOLINE CHLORIDE 140 MG: 20 INJECTION, SOLUTION INTRAMUSCULAR; INTRAVENOUS at 07:04

## 2017-04-01 RX ADMIN — LIDOCAINE HYDROCHLORIDE 50 MG: 20 INJECTION, SOLUTION INTRAVENOUS at 07:04

## 2017-04-01 NOTE — PLAN OF CARE
Problem: Patient Care Overview  Goal: Plan of Care Review  Outcome: Ongoing (interventions implemented as appropriate)  Plan of care reviewed with patient. Patient verbalized understanding. Patient is AAO and VSS. Pain managed with PCA pump.  Pt voiding per kam catheter.  Remains NPO. G tube in place to gravity. Theo in place to right abdomen.On Cardiac monitoring running NSR. Accuchecks performed Q6.   Activity encouraged. Free of falls and injury. Will continue to monitor. Jane Mason RN

## 2017-04-01 NOTE — ANESTHESIA POSTPROCEDURE EVALUATION
"Anesthesia Post Evaluation    Patient: Anayeli Judd    Procedure(s) Performed: Procedure(s) (LRB):  EXPLORATORY-LAPAROTOMY (N/A)  REPAIR  LYSIS-ADHESION (N/A)  PLACEMENT    Final Anesthesia Type: general  Patient location during evaluation: floor  Patient participation: Yes- Able to Participate  Level of consciousness: awake and alert and oriented  Post-procedure vital signs: reviewed and stable  Pain management: adequate  Airway patency: patent  PONV status at discharge: No PONV  Anesthetic complications: no      Cardiovascular status: blood pressure returned to baseline and hemodynamically stable  Respiratory status: spontaneous ventilation, unassisted and room air  Hydration status: euvolemic  Follow-up not needed.        Visit Vitals    BP (!) 141/77    Pulse 88    Temp 36.4 °C (97.5 °F) (Temporal)    Resp 12    Ht 5' 4" (1.626 m)    Wt 69.9 kg (154 lb)    LMP  (LMP Unknown)    SpO2 96%    Breastfeeding No    BMI 26.43 kg/m2       Pain/Roscoe Score: Pain Assessment Performed: Yes (4/1/2017  2:00 PM)  Presence of Pain: complains of pain/discomfort (4/1/2017  2:00 PM)  Pain Rating Prior to Med Admin: 8 (4/1/2017 11:25 AM)  Pain Rating Post Med Admin: 7 (4/1/2017 12:00 PM)  Roscoe Score: 9 (4/1/2017  2:00 PM)      "

## 2017-04-01 NOTE — NURSING TRANSFER
Nursing Transfer Note      4/1/2017     Transfer To: 640    Transfer via stretcher    Transfer with cardiac monitoring    Transported by pct    Medicines sent: none    Chart send with patient: Yes    Notified: family    Patient reassessed at: 4/1/17 1600

## 2017-04-01 NOTE — BRIEF OP NOTE
\[facility]  Brief Operative Note    SUMMARY     Surgery Date: 4/1/2017     Surgeon(s) and Role:     * Herman Fletcher MD - Primary     * Hung Cunningham MD    Assisting Surgeon: None    Pre-op Diagnosis:  SBO (small bowel obstruction) [K56.69]    Post-op Diagnosis:  Post-Op Diagnosis Codes:     * SBO (small bowel obstruction) [K56.69]    Procedure(s) (LRB):  EXPLORATORY-LAPAROTOMY (N/A)  REPAIR  LYSIS-ADHESION (N/A)  PLACEMENT    Anesthesia: Choice    Description of Procedure: ex-lap    Description of the findings of the procedure: exploratory laparotomy; dense adhesions requiring lysis > 2 hours. Multiple serosal injuries requiring oversewing. All adhesions removed and able to milk small bowel contents through. Gastrostomy tube placed and subcutaneous skin flaps.     Estimated Blood Loss: 50 mL         Specimens:   Specimen     None        Hung Cunningham PGy5

## 2017-04-01 NOTE — PROGRESS NOTES
SUBJECTIVE:  Pt seen and examined. Abdominal pain remains. Afebrile. VSS  NGT with 1L bilious output over last 24 hour     OBJECTIVE:  Temp:  [97.3 °F (36.3 °C)-98.7 °F (37.1 °C)]   Pulse:  [61-93]   Resp:  [18]   BP: ()/(50-60)   SpO2:  [93 %-96 %]     I & O (Last 24H):  Intake/Output Summary (Last 24 hours) at 04/01/17 0735  Last data filed at 04/01/17 0700   Gross per 24 hour   Intake          2283.75 ml   Output             1280 ml   Net          1003.75 ml       NAD  RRR  CTAB  NGT with bilious output  Abdomen tender, soft, +bowel sounds      Lab Results   Component Value Date    WBC 2.99 (L) 04/01/2017    HGB 12.9 04/01/2017    HCT 37.9 04/01/2017    MCV 92 04/01/2017    PLT 68 (L) 04/01/2017     Lab Results   Component Value Date    CREATININE 0.8 04/01/2017    BUN 9 04/01/2017     04/01/2017    K 4.3 04/01/2017     04/01/2017    CO2 23 04/01/2017    CALCIUM 9.1 04/01/2017    PHOS 4.3 03/31/2017    MG 1.6 03/31/2017       ASSESSMENT/PLAN:   Anayeli Judd is a 72 y.o. female with SBO    - To the OR for ex lap  - Consents obtained  - Blood prepared  - Patient is DNR, no compressions    Todd Lange MD   [facility]

## 2017-04-01 NOTE — OP NOTE
DATE OF PROCEDURE:  04/01/2017.    PREOPERATIVE DIAGNOSIS:  Small-bowel obstruction.    POSTOPERATIVE DIAGNOSIS:  Small-bowel obstruction.    PROCEDURES PERFORMED:  Exploratory laparotomy, extensive complex lysis of   adhesions, small-bowel repair x14 and insertion of gastrostomy tube.    SURGEON:  Herman Fletcher M.D.    ASSISTANT:  Hung Cunningham M.D. (RES).    ANESTHESIA:  General endotracheal.    ESTIMATED BLOOD LOSS:  50 mL.    COMPLICATIONS:  None.    INDICATIONS FOR PROCEDURE:  This 72-year-old patient was admitted with a bowel   obstruction about 36 hours ago and failed to improve clinically.    OPERATIVE REPORT IN DETAIL:  The patient was brought to the Operating Room and   placed in a supine position, prepped and draped in sterile fashion once   satisfactory general anesthesia was induced.  The midline was opened from   xiphoid to pubis starting at the xiphoid since this had been not previously   entered and eventually all of the viscera were able to be swept away from the   abdominal wall.  An extensive 90-minute lysis of adhesions was required in order   to ultimately free up the entire bowel.  Small bowel was run from ligament of   Treitz to ileocecal valve.  At least 3 layers of dense adhesions were noted to   be resulting in a serial small-bowel obstruction.  These were all taken down.    During the dissection, 14 separate small-bowel injuries were created; one was   full thickness and required a full repair.  The other 13 were serosal tears that   were repaired with multiple numbers of 3-0 silk Lembert sutures.  Once the   procedure was completed, the entire small bowel was run again.  No further   defects were identified.  Small bowel was completely patent and a #18 Shelton was   then brought in through a left upper quadrant stab incision and secured to the   stomach as a gastrostomy tube in a standard Witzel fashion.  The midline fascia   was reapproximated with a running #1 PDS.  A 15-Georgian Alex  drain was placed in   the subcutaneous space through a right lower quadrant stab incision and then   the overlying skin was reapproximated with staples.  Needle, sponge and   instrument counts were correct.  The patient tolerated the procedure well and   was stable at the completion of the operation.      JAMES/CITLALLI  dd: 04/01/2017 11:06:06 (CDT)  td: 04/01/2017 11:35:47 (CDT)  Doc ID   #[id number]  Job ID #[id number]    CC:

## 2017-04-01 NOTE — ANESTHESIA RELEASE NOTE
"Anesthesia Release from PACU Note    Patient: Anayeli Judd    Procedure(s) Performed: Procedure(s) (LRB):  EXPLORATORY-LAPAROTOMY (N/A)  REPAIR  LYSIS-ADHESION (N/A)   PLACEMENT    Anesthesia type: general    Post pain: Adequate analgesia    Post assessment: no apparent anesthetic complications     Last Vitals:   Visit Vitals    BP (!) 141/77    Pulse 88    Temp 36.4 °C (97.5 °F) (Temporal)    Resp 12    Ht 5' 4" (1.626 m)    Wt 69.9 kg (154 lb)    LMP  (LMP Unknown)    SpO2 96%    Breastfeeding No    BMI 26.43 kg/m2       Post vital signs: stable    Level of consciousness: awake, alert  and oriented    Nausea/Vomiting: no nausea/no vomiting    Complications: none    Airway Patency: patent    Respiratory: unassisted, spontaneous ventilation, room air    Cardiovascular: stable and blood pressure at baseline    Hydration: euvolemic  "

## 2017-04-01 NOTE — TRANSFER OF CARE
"Anesthesia Transfer of Care Note    Patient: Anayeli Judd    Procedure(s) Performed: Procedure(s) (LRB):  EXPLORATORY-LAPAROTOMY (N/A)  REPAIR  LYSIS-ADHESION (N/A)  PLACEMENT    Patient location: PACU    Anesthesia Type: general    Transport from OR: Transported from OR on 6-10 L/min O2 by face mask with adequate spontaneous ventilation    Post pain: adequate analgesia    Post assessment: no apparent anesthetic complications    Post vital signs: stable    Level of consciousness: sedated    Nausea/Vomiting: no nausea/vomiting    Complications: none          Last vitals:   Visit Vitals    BP (!) 147/79    Pulse 87    Temp 36.8 °C (98.3 °F) (Axillary)    Resp 16    Ht 5' 4" (1.626 m)    Wt 69.9 kg (154 lb)    LMP  (LMP Unknown)    SpO2 100%    Breastfeeding No    BMI 26.43 kg/m2     "

## 2017-04-01 NOTE — ANESTHESIA PREPROCEDURE EVALUATION
03/31/2017  Anayeli Judd is a 72 y.o., female with PMHx of DM2, GERD, hypothyroidism, colon cancer s/p partial colectomy in the past, and a prior episode of SBO that required SBR, lumbar and cervical spondylosis s/p lumbar surgery and cervical fusion (mildly decreased ROM of neck), found to have SBO per CT. NGT in place, LIWS.     LDAs  PIV 20G R-AC  PIV 18G L-wrist     Previous airway  Direct laryngoscopy; Inserted by: CRNA; Airway Device: Endotracheal Tube; Mask Ventilation: Other (rsi); Blade: Noriega #2; Airway Device Size: 7.0; Style: Cuffed; Cuff Inflation: Minimal occlusive pressure; Inflation Amount: 4; Placement Verified By: Auscultation, Capnometry; Grade: Grade I; Complicating Factors: None; Intubation Findings: Positive EtCO2, Bilateral breath sounds, Atraumatic/Condition of teeth unchanged;  Depth of Insertion: 21; Securment: Lips; Complications: None; Breath Sounds: Equal Bilateral; Insertion Attempts: 1    Pre-operative evaluation for Procedure(s) (LRB):  EXPLORATORY-LAPAROTOMY (N/A)    Anayeli Judd is a 72 y.o. female     Patient Active Problem List   Diagnosis    Lumbar spondylosis    Cervical spondylosis with radiculopathy    Colon cancer    Insomnia    Acid reflux    Carpal tunnel syndrome    Headache(784.0)    Hypothyroid    History of colon cancer    Irritable bowel syndrome    Chronic sinusitis of right maxilla    Diarrhea    Pelvic muscle wasting    GERD (gastroesophageal reflux disease)    Fatty liver    Small bowel obstruction    Partial small bowel obstruction    Hypotension    Family history of breast cancer    Numbness of face    Gait disturbance    DM (diabetes mellitus)    SBO (small bowel obstruction)       Review of patient's allergies indicates:   Allergen Reactions    Codeine Nausea And Vomiting     Other reaction(s): Itching    Percocet   [oxycodone-acetaminophen]      Other reaction(s): Itching    Sulfa (sulfonamide antibiotics)      Other reaction(s): Nausea  Other reaction(s): Itching    Adhesive Rash       No current facility-administered medications on file prior to encounter.      Current Outpatient Prescriptions on File Prior to Encounter   Medication Sig Dispense Refill    atorvastatin (LIPITOR) 40 MG tablet take 1 tablet by mouth once daily 90 tablet 1    azelastine (ASTELIN) 137 mcg nasal spray 1 spray (137 mcg total) by Nasal route 2 (two) times daily. 30 mL 1    blood sugar diagnostic Strp Dispense Cytocentrics contour strips; test blood sugar once daily 100 strip 11    blood-glucose meter (CONTOUR METER) kit Please dispense Suja Juice Contour meter and supplies Use as instructed Test Blood sugar once daily 1 each 0    ciprofloxacin HCl (CIPRO) 250 MG tablet   0    conjugated estrogens (PREMARIN) vaginal cream Place 0.5 g vaginally twice a week. 1 applicator 3    fluticasone (FLONASE) 50 mcg/actuation nasal spray 2 sprays by Each Nare route once daily.  0    GINGER ROOT (GINGER EXTRACT ORAL) Take by mouth.      hydrocortisone 2.5 % cream   0    ipratropium (ATROVENT) 0.06 % nasal spray   0    lancets (ONE TOUCH ULTRASOFT LANCETS) Misc Test blood sugar once daily 200 each 11    levothyroxine (SYNTHROID) 88 MCG tablet Take 1 tablet (88 mcg total) by mouth before breakfast. 1 Tablet Oral Every morning 90 tablet 4    LYRICA 100 mg capsule take 1 capsule by mouth twice a day 180 capsule 3    magnesium 30 mg Tab Take 500 capsules by mouth. Patient's taking magnesium 500mg QD      meloxicam (MOBIC) 15 MG tablet Take 1 tablet (15 mg total) by mouth daily as needed for Pain. With food for arthritis 90 tablet 1    metformin (GLUCOPHAGE-XR) 750 MG 24 hr tablet Take 1 tablet (750 mg total) by mouth 2 (two) times daily with meals. 180 tablet 1    ondansetron (ZOFRAN) 8 MG tablet Take 1 tablet (8 mg total) by mouth every 8 (eight) hours as  needed for Nausea. 20 tablet 0    ospemifene (OSPHENA) 60 mg Tab Take 60 mg by mouth once daily. 90 tablet 1    tramadol (ULTRAM) 50 mg tablet Take 50 mg by mouth every 12 (twelve) hours.  0    turmeric root extract 500 mg Cap Take by mouth.      vitamin D 1000 units Tab Take 185 mg by mouth once daily. D3         Past Surgical History:   Procedure Laterality Date    APPENDECTOMY      BACK SURGERY      CARPAL TUNNEL RELEASE      bilateral      SECTION      CHOLECYSTECTOMY      CHOLECYSTECTOMY      CHOLECYSTECTOMY  1965    COLECTOMY      tumor removal 201    HYSTERECTOMY      posterolateral fusion with autograft bone and Eun mineralized bone matrix  13    [facility] for [facility] spine stenosis    TOE SURGERY      TONSILLECTOMY      TRIGGER FINGER RELEASE         Social History     Social History    Marital status:      Spouse name: N/A    Number of children: N/A    Years of education: N/A     Occupational History    Not on file.     Social History Main Topics    Smoking status: Never Smoker    Smokeless tobacco: Never Used    Alcohol use 3.0 oz/week     5 Glasses of wine per week      Comment: socially    Drug use: No    Sexual activity: No     Other Topics Concern    Not on file     Social History Narrative    , lives alone.  Primary support are her children and friends.         Vital Signs Range (Last 24H):  Temp:  [36.7 °C (98 °F)-37.1 °C (98.7 °F)]   Pulse:  [61-84]   Resp:  [17-21]   BP: (101-145)/(59-78)   SpO2:  [93 %-97 %]       CBC:   Recent Labs      17   1934  17   0344   WBC  9.63  6.72   RBC  4.96  4.31   HGB  15.7  13.2   HCT  45.5  38.8   PLT  121*  120*   MCV  92  90   MCH  31.7*  30.6   MCHC  34.5  34.0       CMP:   Recent Labs      17   1934  17   0344   NA  137  136   K  4.2  4.3   CL  101  103   CO2  23  24   BUN  14  15   CREATININE  1.1  1.0   GLU  153*  131*   MG   --   1.6   PHOS   --   4.3   CALCIUM  10.3  9.2    ALBUMIN  4.1  3.5   PROT  8.1  6.7   ALKPHOS  56  52*   ALT  23  24   AST  24  27   BILITOT  1.1*  0.9       INR  Recent Labs      03/30/17   1934   INR  1.0           Diagnostic Studies:      EKG:  Sinus rhythm with short VT  Otherwise normal ECG  When compared with ECG of 03-SEP-2014 20:25,  No significant change was found  Confirmed by RAMON SIFUENTES MD ([id number]) on 3/31/2017 2:24:03 PM    2D Echo:  None in [facility] Anesthesia Evaluation    I have reviewed the Patient Summary Reports.    I have reviewed the Nursing Notes.   I have reviewed the Medications.     Review of Systems  Anesthesia Hx:  No problems with previous Anesthesia Denies Hx of Anesthetic complications  History of prior surgery of interest to airway management or planning: Previous anesthesia: General, MAC colectomy with general anesthesia.   colonoscopy with MAC.  Procedure performed at an [facility]. Denies Family Hx of Anesthesia complications.   Denies Personal Hx of Anesthesia complications.   Social:  Non-Smoker, No Alcohol Use    Cardiovascular:   Denies Hypertension.     Pulmonary:   Denies COPD.  Denies Asthma.    Hepatic/GI:   GERD, well controlled Liver Disease,    Musculoskeletal:   Arthritis     Neurological:   Neuromuscular Disease, Headaches    Endocrine:   Diabetes (diet controlled), poorly controlled, type 2 Hypothyroidism        Physical Exam  General:  Well nourished    Airway/Jaw/Neck:  Airway Findings: Mouth Opening: Normal Tongue: Normal  General Airway Assessment: Adult  Mallampati: II  TM Distance: 4 - 6 cm  Jaw/Neck Findings:  Neck ROM: Extension Decreased, Mild, Decreased Lateral Motion, to the right, to the left      Dental:  Dental Findings: In tact    Chest/Lungs:  Chest/Lungs Findings: Clear to auscultation, Normal Respiratory Rate     Heart/Vascular:  Heart Findings: Rate: Normal  Rhythm: Regular Rhythm  Sounds: Normal     Abdomen:  Abdomen Findings: Normal    Musculoskeletal:  Musculoskeletal Findings:  Normal   Skin:  Skin Findings: Normal    Mental Status:  Mental Status Findings:  Cooperative, Alert and Oriented         Anesthesia Plan  Type of Anesthesia, risks & benefits discussed:  Anesthesia Type:  general  Patient's Preference:   Intra-op Monitoring Plan: standard ASA monitors  Intra-op Monitoring Plan Comments:   Post Op Pain Control Plan:   Post Op Pain Control Plan Comments:   Induction:   IV  Beta Blocker:  Patient is not currently on a Beta-Blocker (No further documentation required).       Informed Consent: Patient understands risks and agrees with Anesthesia plan.  Questions answered. Anesthesia consent signed with patient.  ASA Score: 2     Day of Surgery Review of History & Physical: I have interviewed and examined the patient. I have reviewed the patient's H&P dated:  There are no significant changes.  H&P update referred to the provider.     Anesthesia Plan Notes: Per patient: No egg allergy. Eats eggs without complications.         Ready For Surgery From Anesthesia Perspective.

## 2017-04-01 NOTE — PLAN OF CARE
Problem: Diabetes, Type 2 (Adult)  Goal: Signs and Symptoms of Listed Potential Problems Will be Absent, Minimized or Managed (Diabetes, Type 2)  Signs and symptoms of listed potential problems will be absent, minimized or managed by discharge/transition of care (reference Diabetes, Type 2 (Adult) CPG).   Outcome: Ongoing (interventions implemented as appropriate)  Pt's 00:00 blood sugar was 101. Pt receiving D5 IV fluids at 25 mL/hr per orders, remains NPO.     Problem: Patient Care Overview  Goal: Plan of Care Review  Outcome: Ongoing (interventions implemented as appropriate)  Pt still complaining of abdominal pain but states it's better than yesterday, well controlled with pain medication, particularly at rest. NG tube to low intermittent suction still draining dark green fluid, flushed once this shift. Output from suction 440 mL so far this shift. SCDs in place when pt in bed. Exploratory laparotomy scheduled for morning, pt aware.

## 2017-04-01 NOTE — ANESTHESIA PROCEDURE NOTES
Arterial    Diagnosis: SBO    Patient location during procedure: done in OR  Procedure start time: 4/1/2017 7:39 AM  Timeout: 4/1/2017 7:38 AM  Procedure end time: 4/1/2017 7:40 AM  Staffing  Anesthesiologist: PITA GARLAND  Resident/CRNA: ANA LILIA SARMIENTO  Performed by: resident/CRNA   Anesthesiologist was present at the time of the procedure.  Preanesthetic Checklist  Completed: patient identified, site marked, surgical consent, pre-op evaluation, timeout performed, IV checked, risks and benefits discussed, monitors and equipment checked and anesthesia consent given  Arterial Line  Skin Prep: alcohol swabs  Local Infiltration: none  Orientation: left  Location: radial  Catheter Size: 20 G{[facility] ANESTHESIA BLOCK ART PLACEMENTInsertion Attempts: 1  Assessment  Dressing: secured with tape and tegaderm

## 2017-04-02 LAB
ALBUMIN SERPL BCP-MCNC: 2.8 G/DL
ALP SERPL-CCNC: 55 U/L
ALT SERPL W/O P-5'-P-CCNC: 17 U/L
ANION GAP SERPL CALC-SCNC: 13 MMOL/L
ANION GAP SERPL CALC-SCNC: 16 MMOL/L
AST SERPL-CCNC: 23 U/L
BASOPHILS # BLD AUTO: 0 K/UL
BASOPHILS # BLD AUTO: 0 K/UL
BASOPHILS # BLD AUTO: 0.03 K/UL
BASOPHILS NFR BLD: 0 %
BASOPHILS NFR BLD: 0 %
BASOPHILS NFR BLD: 1 %
BILIRUB SERPL-MCNC: 1.5 MG/DL
BUN SERPL-MCNC: 16 MG/DL
BUN SERPL-MCNC: 23 MG/DL
CALCIUM SERPL-MCNC: 8.2 MG/DL
CALCIUM SERPL-MCNC: 8.4 MG/DL
CHLORIDE SERPL-SCNC: 102 MMOL/L
CHLORIDE SERPL-SCNC: 106 MMOL/L
CO2 SERPL-SCNC: 13 MMOL/L
CO2 SERPL-SCNC: 23 MMOL/L
CREAT SERPL-MCNC: 1.1 MG/DL
CREAT SERPL-MCNC: 1.2 MG/DL
DIFFERENTIAL METHOD: ABNORMAL
EOSINOPHIL # BLD AUTO: 0 K/UL
EOSINOPHIL # BLD AUTO: 0 K/UL
EOSINOPHIL # BLD AUTO: 0.1 K/UL
EOSINOPHIL NFR BLD: 0 %
EOSINOPHIL NFR BLD: 0 %
EOSINOPHIL NFR BLD: 1.6 %
ERYTHROCYTE [DISTWIDTH] IN BLOOD BY AUTOMATED COUNT: 12.6 %
ERYTHROCYTE [DISTWIDTH] IN BLOOD BY AUTOMATED COUNT: 12.8 %
ERYTHROCYTE [DISTWIDTH] IN BLOOD BY AUTOMATED COUNT: 12.9 %
EST. GFR  (AFRICAN AMERICAN): 51.4 ML/MIN/1.73 M^2
EST. GFR  (AFRICAN AMERICAN): 57.2 ML/MIN/1.73 M^2
EST. GFR  (NON AFRICAN AMERICAN): 44.6 ML/MIN/1.73 M^2
EST. GFR  (NON AFRICAN AMERICAN): 49.6 ML/MIN/1.73 M^2
GLUCOSE SERPL-MCNC: 156 MG/DL
GLUCOSE SERPL-MCNC: 200 MG/DL
HCT VFR BLD AUTO: 38 %
HCT VFR BLD AUTO: 38.2 %
HCT VFR BLD AUTO: 38.8 %
HGB BLD-MCNC: 12.5 G/DL
HGB BLD-MCNC: 12.9 G/DL
HGB BLD-MCNC: 12.9 G/DL
LACTATE SERPL-SCNC: 2.5 MMOL/L
LACTATE SERPL-SCNC: 4 MMOL/L
LACTATE SERPL-SCNC: 4.3 MMOL/L
LYMPHOCYTES # BLD AUTO: 0.9 K/UL
LYMPHOCYTES # BLD AUTO: 1 K/UL
LYMPHOCYTES # BLD AUTO: 1 K/UL
LYMPHOCYTES NFR BLD: 31 %
LYMPHOCYTES NFR BLD: 31.6 %
LYMPHOCYTES NFR BLD: 32.1 %
MAGNESIUM SERPL-MCNC: 1.5 MG/DL
MAGNESIUM SERPL-MCNC: 1.5 MG/DL
MCH RBC QN AUTO: 30.5 PG
MCH RBC QN AUTO: 30.7 PG
MCH RBC QN AUTO: 30.9 PG
MCHC RBC AUTO-ENTMCNC: 32.9 %
MCHC RBC AUTO-ENTMCNC: 33.2 %
MCHC RBC AUTO-ENTMCNC: 33.8 %
MCV RBC AUTO: 91 FL
MCV RBC AUTO: 92 FL
MCV RBC AUTO: 93 FL
MONOCYTES # BLD AUTO: 0.3 K/UL
MONOCYTES # BLD AUTO: 0.9 K/UL
MONOCYTES # BLD AUTO: 0.9 K/UL
MONOCYTES NFR BLD: 10.2 %
MONOCYTES NFR BLD: 29.5 %
MONOCYTES NFR BLD: 31.3 %
NEUTROPHILS # BLD AUTO: 1.1 K/UL
NEUTROPHILS # BLD AUTO: 1.1 K/UL
NEUTROPHILS # BLD AUTO: 1.7 K/UL
NEUTROPHILS NFR BLD: 37.7 %
NEUTROPHILS NFR BLD: 38.4 %
NEUTROPHILS NFR BLD: 55.6 %
PHOSPHATE SERPL-MCNC: 3.2 MG/DL
PHOSPHATE SERPL-MCNC: 3.2 MG/DL
PLATELET # BLD AUTO: 130 K/UL
PLATELET # BLD AUTO: 134 K/UL
PLATELET # BLD AUTO: 144 K/UL
PMV BLD AUTO: 11.6 FL
PMV BLD AUTO: 11.9 FL
PMV BLD AUTO: 12 FL
POCT GLUCOSE: 172 MG/DL (ref 70–110)
POCT GLUCOSE: 191 MG/DL (ref 70–110)
POCT GLUCOSE: 218 MG/DL (ref 70–110)
POCT GLUCOSE: 225 MG/DL (ref 70–110)
POTASSIUM SERPL-SCNC: 4.3 MMOL/L
POTASSIUM SERPL-SCNC: 4.5 MMOL/L
PROT SERPL-MCNC: 6.1 G/DL
RBC # BLD AUTO: 4.07 M/UL
RBC # BLD AUTO: 4.18 M/UL
RBC # BLD AUTO: 4.23 M/UL
SODIUM SERPL-SCNC: 135 MMOL/L
SODIUM SERPL-SCNC: 138 MMOL/L
WBC # BLD AUTO: 3 K/UL
WBC # BLD AUTO: 3.02 K/UL
WBC # BLD AUTO: 3.04 K/UL

## 2017-04-02 PROCEDURE — C9113 INJ PANTOPRAZOLE SODIUM, VIA: HCPCS | Performed by: SURGERY

## 2017-04-02 PROCEDURE — 20600001 HC STEP DOWN PRIVATE ROOM

## 2017-04-02 PROCEDURE — 63600175 PHARM REV CODE 636 W HCPCS: Performed by: SURGERY

## 2017-04-02 PROCEDURE — 25000003 PHARM REV CODE 250: Performed by: SURGERY

## 2017-04-02 PROCEDURE — 84100 ASSAY OF PHOSPHORUS: CPT | Mod: 91

## 2017-04-02 PROCEDURE — 83735 ASSAY OF MAGNESIUM: CPT | Mod: 91

## 2017-04-02 PROCEDURE — 83605 ASSAY OF LACTIC ACID: CPT | Mod: 91

## 2017-04-02 PROCEDURE — 36415 COLL VENOUS BLD VENIPUNCTURE: CPT

## 2017-04-02 PROCEDURE — 63600175 PHARM REV CODE 636 W HCPCS

## 2017-04-02 PROCEDURE — 25000003 PHARM REV CODE 250: Performed by: ANESTHESIOLOGY

## 2017-04-02 PROCEDURE — 85025 COMPLETE CBC W/AUTO DIFF WBC: CPT | Mod: 91

## 2017-04-02 PROCEDURE — 94664 DEMO&/EVAL PT USE INHALER: CPT

## 2017-04-02 PROCEDURE — 99900035 HC TECH TIME PER 15 MIN (STAT)

## 2017-04-02 PROCEDURE — 25000003 PHARM REV CODE 250: Performed by: STUDENT IN AN ORGANIZED HEALTH CARE EDUCATION/TRAINING PROGRAM

## 2017-04-02 PROCEDURE — 94799 UNLISTED PULMONARY SVC/PX: CPT

## 2017-04-02 PROCEDURE — 80048 BASIC METABOLIC PNL TOTAL CA: CPT

## 2017-04-02 PROCEDURE — 80053 COMPREHEN METABOLIC PANEL: CPT

## 2017-04-02 PROCEDURE — 83605 ASSAY OF LACTIC ACID: CPT

## 2017-04-02 RX ORDER — HYDROXYZINE HYDROCHLORIDE 25 MG/1
25 TABLET, FILM COATED ORAL ONCE
Status: DISCONTINUED | OUTPATIENT
Start: 2017-04-02 | End: 2017-04-02

## 2017-04-02 RX ORDER — ACETAMINOPHEN 10 MG/ML
1000 INJECTION, SOLUTION INTRAVENOUS EVERY 8 HOURS
Status: DISCONTINUED | OUTPATIENT
Start: 2017-04-02 | End: 2017-04-02

## 2017-04-02 RX ORDER — MAGNESIUM SULFATE HEPTAHYDRATE 40 MG/ML
2 INJECTION, SOLUTION INTRAVENOUS
Status: COMPLETED | OUTPATIENT
Start: 2017-04-02 | End: 2017-04-02

## 2017-04-02 RX ADMIN — SODIUM CHLORIDE 500 ML: 0.9 INJECTION, SOLUTION INTRAVENOUS at 04:04

## 2017-04-02 RX ADMIN — DIPHENHYDRAMINE HYDROCHLORIDE 12.5 MG: 50 INJECTION, SOLUTION INTRAMUSCULAR; INTRAVENOUS at 10:04

## 2017-04-02 RX ADMIN — SODIUM CHLORIDE 1000 ML: 0.9 INJECTION, SOLUTION INTRAVENOUS at 05:04

## 2017-04-02 RX ADMIN — PANTOPRAZOLE SODIUM 40 MG: 40 INJECTION, POWDER, FOR SOLUTION INTRAVENOUS at 08:04

## 2017-04-02 RX ADMIN — INSULIN ASPART 2 UNITS: 100 INJECTION, SOLUTION INTRAVENOUS; SUBCUTANEOUS at 06:04

## 2017-04-02 RX ADMIN — SODIUM CHLORIDE 1000 ML: 0.9 INJECTION, SOLUTION INTRAVENOUS at 06:04

## 2017-04-02 RX ADMIN — SODIUM CHLORIDE 1000 ML: 0.9 INJECTION, SOLUTION INTRAVENOUS at 10:04

## 2017-04-02 RX ADMIN — MAGNESIUM SULFATE IN WATER 2 G: 40 INJECTION, SOLUTION INTRAVENOUS at 10:04

## 2017-04-02 RX ADMIN — INSULIN ASPART 2 UNITS: 100 INJECTION, SOLUTION INTRAVENOUS; SUBCUTANEOUS at 01:04

## 2017-04-02 RX ADMIN — ENOXAPARIN SODIUM 40 MG: 100 INJECTION SUBCUTANEOUS at 04:04

## 2017-04-02 RX ADMIN — MAGNESIUM SULFATE IN WATER 2 G: 40 INJECTION, SOLUTION INTRAVENOUS at 01:04

## 2017-04-02 RX ADMIN — ACETAMINOPHEN 1000 MG: 10 INJECTION, SOLUTION INTRAVENOUS at 08:04

## 2017-04-02 RX ADMIN — Medication 3 ML: at 10:04

## 2017-04-02 RX ADMIN — ACETAMINOPHEN 1000 MG: 10 INJECTION, SOLUTION INTRAVENOUS at 04:04

## 2017-04-02 RX ADMIN — ONDANSETRON 4 MG: 2 INJECTION INTRAMUSCULAR; INTRAVENOUS at 04:04

## 2017-04-02 RX ADMIN — PROMETHAZINE HYDROCHLORIDE 6.25 MG: 25 INJECTION, SOLUTION INTRAMUSCULAR; INTRAVENOUS at 07:04

## 2017-04-02 RX ADMIN — Medication: at 07:04

## 2017-04-02 RX ADMIN — DIPHENHYDRAMINE HYDROCHLORIDE 12.5 MG: 50 INJECTION, SOLUTION INTRAMUSCULAR; INTRAVENOUS at 04:04

## 2017-04-02 RX ADMIN — PROMETHAZINE HYDROCHLORIDE 6.25 MG: 25 INJECTION, SOLUTION INTRAMUSCULAR; INTRAVENOUS at 02:04

## 2017-04-02 NOTE — PLAN OF CARE
Problem: Patient Care Overview  Goal: Plan of Care Review  Outcome: Ongoing (interventions implemented as appropriate)  Plan of care reviewed with patient. Patient verbalized understanding. Patient is AAO and VSS. Pain managed with PCA pump. Nausea managed with IV Phenergan.  One occurence of emesis noted.  Pt voiding per kam catheter. Remains NPO with very little ice chips.  G tube in place to gravity and flush q8 with 30 ml of water. Theo in place to right abdomen. On Cardiac monitoring running NSR. Accuchecks performed Q6. Up to chair during shift. Free of falls and injury. Will continue to monitor.  Jane Mason RN

## 2017-04-02 NOTE — PROGRESS NOTES
SUBJECTIVE:  Patient seen and examined at bedside  Not pushing PCA overnight, but abdominal pain controlled with medications  No fevers  Got bolus for elevated lactate, lactate clearing  Making adequate urine    OBJECTIVE:  Temp:  [97.5 °F (36.4 °C)-100.2 °F (37.9 °C)]   Pulse:  []   Resp:  [12-20]   BP: (115-158)/(60-79)   SpO2:  [91 %-100 %]     I & O (Last 24H):  Intake/Output Summary (Last 24 hours) at 04/02/17 0712  Last data filed at 04/02/17 0600   Gross per 24 hour   Intake          2373.75 ml   Output             1505 ml   Net           868.75 ml     General: Alert, mild distress  Head: Normocephalic,  atraumatic  Lungs: Clear to auscultation bilaterally, respirations unlabored  Heart: Regular rate and rhythm, no appreciable murmur  Abdomen: soft, attp, incision covered with gauze, G tube with thick bilious output  Extremities: normal, atraumatic, no edema  Pulses: 2+ symmetric  Neurologic: No gross CN deficit, normal strength and sensation throughout        Lab Results   Component Value Date    WBC 3.00 (L) 04/02/2017    WBC 3.02 (L) 04/02/2017    HGB 12.9 04/02/2017    HGB 12.9 04/02/2017    HCT 38.8 04/02/2017    HCT 38.2 04/02/2017    MCV 92 04/02/2017    MCV 91 04/02/2017     (L) 04/02/2017     (L) 04/02/2017     Lab Results   Component Value Date    CREATININE 1.1 04/02/2017    BUN 16 04/02/2017     04/02/2017    K 4.3 04/02/2017     04/02/2017    CO2 23 04/02/2017    CALCIUM 8.2 (L) 04/02/2017    PHOS 3.2 04/02/2017    PHOS 3.2 04/02/2017    MG 1.5 (L) 04/02/2017    MG 1.5 (L) 04/02/2017       ASSESSMENT/PLAN:   Anayeli Judd is a 72 y.o. female s/p Procedure(s) (LRB):  EXPLORATORY-LAPAROTOMY (N/A)  REPAIR  LYSIS-ADHESION (N/A)  PLACEMENT 1 Day Post-Op    - NPO  - G tube to gravity, flush q6  - PCA and multimodal pain control  - IS/Acapella  - OOBTC/PT/OT  - Lovenox/ambulate/SCDs    Todd Lange MD   [facility]            I have personally performed  a detailed history and physical examination on this patient. My findings are summarized in the resident's note included in the record.  Doing well after complex operation 24 hours ago

## 2017-04-02 NOTE — PROGRESS NOTES
Notified Dr. Oleary about critical lactic acid of 4.0. MD to bedside. 1L bolus, repeat lactic acid, STAT abdominal x-ray ordered. Pt has no complaints at this time and is AAOx4. VSS. Will continue to monitor.

## 2017-04-02 NOTE — PLAN OF CARE
Problem: Patient Care Overview  Goal: Plan of Care Review  Outcome: Ongoing (interventions implemented as appropriate)  Pt AAOx4. VSS, afebrile. Pain controlled with dilaudid PCA. ML incision C/D/I. G tube to gravity with green output. Shelton intact draining clear carlos urine. No c/o nausea during shift. Pt is free from falls/injury/trauma. SCD/TEDs in place. Call bell within reach, sleeping between care. No acute events overnight, will continue to monitor.

## 2017-04-02 NOTE — PROGRESS NOTES
Notified Dr. Oleary that urine output has been < 30 cc/hr through kam catheter. MD ordered 500cc NS bolus. Will continue to monitor.

## 2017-04-03 LAB
ALBUMIN SERPL BCP-MCNC: 2.1 G/DL
ALP SERPL-CCNC: 38 U/L
ALT SERPL W/O P-5'-P-CCNC: 13 U/L
ANION GAP SERPL CALC-SCNC: 10 MMOL/L
ANION GAP SERPL CALC-SCNC: 9 MMOL/L
ANISOCYTOSIS BLD QL SMEAR: SLIGHT
AST SERPL-CCNC: 30 U/L
BASOPHILS # BLD AUTO: 0.01 K/UL
BASOPHILS NFR BLD: 0.3 %
BILIRUB SERPL-MCNC: 1.2 MG/DL
BLD PROD TYP BPU: NORMAL
BLD PROD TYP BPU: NORMAL
BLOOD UNIT EXPIRATION DATE: NORMAL
BLOOD UNIT EXPIRATION DATE: NORMAL
BLOOD UNIT TYPE CODE: 6200
BLOOD UNIT TYPE CODE: 6200
BLOOD UNIT TYPE: NORMAL
BLOOD UNIT TYPE: NORMAL
BUN SERPL-MCNC: 22 MG/DL
BUN SERPL-MCNC: 23 MG/DL
BURR CELLS BLD QL SMEAR: ABNORMAL
CALCIUM SERPL-MCNC: 6.8 MG/DL
CALCIUM SERPL-MCNC: 7 MG/DL
CHLORIDE SERPL-SCNC: 112 MMOL/L
CHLORIDE SERPL-SCNC: 113 MMOL/L
CO2 SERPL-SCNC: 16 MMOL/L
CO2 SERPL-SCNC: 17 MMOL/L
CODING SYSTEM: NORMAL
CODING SYSTEM: NORMAL
CREAT SERPL-MCNC: 1 MG/DL
CREAT SERPL-MCNC: 1.2 MG/DL
DIFFERENTIAL METHOD: ABNORMAL
DISPENSE STATUS: NORMAL
DISPENSE STATUS: NORMAL
EOSINOPHIL # BLD AUTO: 0 K/UL
EOSINOPHIL NFR BLD: 0.9 %
ERYTHROCYTE [DISTWIDTH] IN BLOOD BY AUTOMATED COUNT: 12.9 %
EST. GFR  (AFRICAN AMERICAN): 51.4 ML/MIN/1.73 M^2
EST. GFR  (AFRICAN AMERICAN): >60 ML/MIN/1.73 M^2
EST. GFR  (NON AFRICAN AMERICAN): 44.6 ML/MIN/1.73 M^2
EST. GFR  (NON AFRICAN AMERICAN): 55.6 ML/MIN/1.73 M^2
GLUCOSE SERPL-MCNC: 143 MG/DL
GLUCOSE SERPL-MCNC: 96 MG/DL
HCT VFR BLD AUTO: 27.6 %
HGB BLD-MCNC: 9.4 G/DL
HYPOCHROMIA BLD QL SMEAR: ABNORMAL
LACTATE SERPL-SCNC: 3.3 MMOL/L
LACTATE SERPL-SCNC: 5.9 MMOL/L
LYMPHOCYTES # BLD AUTO: 0.9 K/UL
LYMPHOCYTES NFR BLD: 28.9 %
MAGNESIUM SERPL-MCNC: 2.3 MG/DL
MCH RBC QN AUTO: 30.4 PG
MCHC RBC AUTO-ENTMCNC: 34.1 %
MCV RBC AUTO: 89 FL
MONOCYTES # BLD AUTO: 0.7 K/UL
MONOCYTES NFR BLD: 23 %
NEUTROPHILS # BLD AUTO: 1.5 K/UL
NEUTROPHILS NFR BLD: 46.9 %
OVALOCYTES BLD QL SMEAR: ABNORMAL
PHOSPHATE SERPL-MCNC: 1.3 MG/DL
PLATELET # BLD AUTO: 87 K/UL
PMV BLD AUTO: 11.9 FL
POCT GLUCOSE: 104 MG/DL (ref 70–110)
POCT GLUCOSE: 113 MG/DL (ref 70–110)
POCT GLUCOSE: 119 MG/DL (ref 70–110)
POCT GLUCOSE: 147 MG/DL (ref 70–110)
POCT GLUCOSE: 195 MG/DL (ref 70–110)
POCT GLUCOSE: 90 MG/DL (ref 70–110)
POIKILOCYTOSIS BLD QL SMEAR: SLIGHT
POLYCHROMASIA BLD QL SMEAR: ABNORMAL
POTASSIUM SERPL-SCNC: 3.4 MMOL/L
POTASSIUM SERPL-SCNC: 4.6 MMOL/L
PROT SERPL-MCNC: 4.7 G/DL
RBC # BLD AUTO: 3.09 M/UL
SODIUM SERPL-SCNC: 138 MMOL/L
SODIUM SERPL-SCNC: 139 MMOL/L
TRANS ERYTHROCYTES VOL PATIENT: NORMAL ML
TRANS ERYTHROCYTES VOL PATIENT: NORMAL ML
WBC # BLD AUTO: 3.18 K/UL

## 2017-04-03 PROCEDURE — C1751 CATH, INF, PER/CENT/MIDLINE: HCPCS

## 2017-04-03 PROCEDURE — 63600175 PHARM REV CODE 636 W HCPCS: Performed by: PHYSICIAN ASSISTANT

## 2017-04-03 PROCEDURE — 80048 BASIC METABOLIC PNL TOTAL CA: CPT

## 2017-04-03 PROCEDURE — 84100 ASSAY OF PHOSPHORUS: CPT

## 2017-04-03 PROCEDURE — 25000003 PHARM REV CODE 250: Performed by: STUDENT IN AN ORGANIZED HEALTH CARE EDUCATION/TRAINING PROGRAM

## 2017-04-03 PROCEDURE — 25000003 PHARM REV CODE 250: Performed by: SURGERY

## 2017-04-03 PROCEDURE — 25000003 PHARM REV CODE 250: Performed by: PHYSICIAN ASSISTANT

## 2017-04-03 PROCEDURE — 97165 OT EVAL LOW COMPLEX 30 MIN: CPT

## 2017-04-03 PROCEDURE — 36415 COLL VENOUS BLD VENIPUNCTURE: CPT

## 2017-04-03 PROCEDURE — 97161 PT EVAL LOW COMPLEX 20 MIN: CPT

## 2017-04-03 PROCEDURE — 63600175 PHARM REV CODE 636 W HCPCS: Performed by: STUDENT IN AN ORGANIZED HEALTH CARE EDUCATION/TRAINING PROGRAM

## 2017-04-03 PROCEDURE — 25000003 PHARM REV CODE 250

## 2017-04-03 PROCEDURE — 80053 COMPREHEN METABOLIC PANEL: CPT

## 2017-04-03 PROCEDURE — 36569 INSJ PICC 5 YR+ W/O IMAGING: CPT

## 2017-04-03 PROCEDURE — 63600175 PHARM REV CODE 636 W HCPCS: Performed by: SURGERY

## 2017-04-03 PROCEDURE — 94664 DEMO&/EVAL PT USE INHALER: CPT

## 2017-04-03 PROCEDURE — 20600001 HC STEP DOWN PRIVATE ROOM

## 2017-04-03 PROCEDURE — 83605 ASSAY OF LACTIC ACID: CPT

## 2017-04-03 PROCEDURE — 76937 US GUIDE VASCULAR ACCESS: CPT

## 2017-04-03 PROCEDURE — C9113 INJ PANTOPRAZOLE SODIUM, VIA: HCPCS | Performed by: SURGERY

## 2017-04-03 PROCEDURE — 85025 COMPLETE CBC W/AUTO DIFF WBC: CPT

## 2017-04-03 PROCEDURE — 97535 SELF CARE MNGMENT TRAINING: CPT

## 2017-04-03 PROCEDURE — 83605 ASSAY OF LACTIC ACID: CPT | Mod: 91

## 2017-04-03 PROCEDURE — 83735 ASSAY OF MAGNESIUM: CPT

## 2017-04-03 RX ORDER — ACETAMINOPHEN 10 MG/ML
1000 INJECTION, SOLUTION INTRAVENOUS ONCE
Status: COMPLETED | OUTPATIENT
Start: 2017-04-03 | End: 2017-04-03

## 2017-04-03 RX ORDER — CIPROFLOXACIN 2 MG/ML
400 INJECTION, SOLUTION INTRAVENOUS
Status: DISCONTINUED | OUTPATIENT
Start: 2017-04-03 | End: 2017-04-03

## 2017-04-03 RX ORDER — POTASSIUM CHLORIDE 7.45 MG/ML
10 INJECTION INTRAVENOUS
Status: COMPLETED | OUTPATIENT
Start: 2017-04-03 | End: 2017-04-03

## 2017-04-03 RX ORDER — METRONIDAZOLE 500 MG/100ML
500 INJECTION, SOLUTION INTRAVENOUS
Status: DISCONTINUED | OUTPATIENT
Start: 2017-04-03 | End: 2017-04-03

## 2017-04-03 RX ADMIN — SODIUM CHLORIDE, SODIUM LACTATE, POTASSIUM CHLORIDE, AND CALCIUM CHLORIDE: 600; 310; 30; 20 INJECTION, SOLUTION INTRAVENOUS at 09:04

## 2017-04-03 RX ADMIN — SODIUM CHLORIDE 1000 ML: 0.9 INJECTION, SOLUTION INTRAVENOUS at 04:04

## 2017-04-03 RX ADMIN — POTASSIUM CHLORIDE 10 MEQ: 10 INJECTION, SOLUTION INTRAVENOUS at 04:04

## 2017-04-03 RX ADMIN — POTASSIUM PHOSPHATE, MONOBASIC AND POTASSIUM PHOSPHATE, DIBASIC 30 MMOL: 224; 236 INJECTION, SOLUTION, CONCENTRATE INTRAVENOUS at 10:04

## 2017-04-03 RX ADMIN — Medication 3 ML: at 09:04

## 2017-04-03 RX ADMIN — SODIUM CHLORIDE, SODIUM LACTATE, POTASSIUM CHLORIDE, AND CALCIUM CHLORIDE: 600; 310; 30; 20 INJECTION, SOLUTION INTRAVENOUS at 02:04

## 2017-04-03 RX ADMIN — Medication 1 CAPSULE: at 02:04

## 2017-04-03 RX ADMIN — METRONIDAZOLE 500 MG: 500 INJECTION, SOLUTION INTRAVENOUS at 03:04

## 2017-04-03 RX ADMIN — PANTOPRAZOLE SODIUM 40 MG: 40 INJECTION, POWDER, FOR SOLUTION INTRAVENOUS at 09:04

## 2017-04-03 RX ADMIN — SODIUM CHLORIDE 1000 ML: 0.9 INJECTION, SOLUTION INTRAVENOUS at 07:04

## 2017-04-03 RX ADMIN — SODIUM CHLORIDE 1000 ML: 0.9 INJECTION, SOLUTION INTRAVENOUS at 02:04

## 2017-04-03 RX ADMIN — SODIUM CHLORIDE, SODIUM LACTATE, POTASSIUM CHLORIDE, AND CALCIUM CHLORIDE 1000 ML: .6; .31; .03; .02 INJECTION, SOLUTION INTRAVENOUS at 10:04

## 2017-04-03 RX ADMIN — Medication 3 ML: at 06:04

## 2017-04-03 RX ADMIN — ENOXAPARIN SODIUM 40 MG: 100 INJECTION SUBCUTANEOUS at 04:04

## 2017-04-03 RX ADMIN — ONDANSETRON 4 MG: 2 INJECTION INTRAMUSCULAR; INTRAVENOUS at 09:04

## 2017-04-03 RX ADMIN — SODIUM CHLORIDE 1000 ML: 0.9 INJECTION, SOLUTION INTRAVENOUS at 12:04

## 2017-04-03 RX ADMIN — ACETAMINOPHEN 1000 MG: 10 INJECTION, SOLUTION INTRAVENOUS at 01:04

## 2017-04-03 RX ADMIN — ACETAMINOPHEN 1000 MG: 10 INJECTION, SOLUTION INTRAVENOUS at 10:04

## 2017-04-03 RX ADMIN — POTASSIUM CHLORIDE 10 MEQ: 10 INJECTION, SOLUTION INTRAVENOUS at 05:04

## 2017-04-03 RX ADMIN — Medication: at 12:04

## 2017-04-03 RX ADMIN — CIPROFLOXACIN 400 MG: 2 INJECTION, SOLUTION INTRAVENOUS at 06:04

## 2017-04-03 RX ADMIN — Medication 3 ML: at 02:04

## 2017-04-03 RX ADMIN — ONDANSETRON 4 MG: 2 INJECTION INTRAMUSCULAR; INTRAVENOUS at 12:04

## 2017-04-03 NOTE — PT/OT/SLP EVAL
Occupational Therapy  Evaluation/Treatment     Hardik Judd   MRN: [id number]   Admitting Diagnosis: SBO (small bowel obstruction) EXPLORATORY-LAPAROTOMY       OT Date of Treatment: 17   OT Start Time: 09  OT Stop Time: 2  OT Total Time (min): 48 min    Billable Minutes:  Evaluation 20  Self Care/Home Management 28    Diagnosis: SBO (small bowel obstruction)   1. SBO (small bowel obstruction)    exploratory laparotomy; dense adhesions requiring lysis > 2 hours. Multiple serosal injuries requiring oversewing. All adhesions removed and able to milk small bowel contents through. Gastrostomy tube placed and subcutaneous skin flaps.       Past Medical History:   Diagnosis Date    Allergic rhinitis     Arthritis     Blood transfusion     during delivery and     Bowel obstruction     Cervical radiculopathy     followed by dr cloud    Colon cancer     Diabetes mellitus     Diarrhea     Family history of breast cancer     Family history of colon cancer     Fatty liver     GERD (gastroesophageal reflux disease)     History of shingles     Hyperlipidemia     Hypothyroidism     Irritable bowel syndrome     Microscopic colitis     treated     Raynaud phenomenon     Raynaud's disease       Past Surgical History:   Procedure Laterality Date    APPENDECTOMY      BACK SURGERY      CARPAL TUNNEL RELEASE      bilateral      SECTION      CHOLECYSTECTOMY      CHOLECYSTECTOMY      CHOLECYSTECTOMY  1965    COLECTOMY      tumor removal 201    HYSTERECTOMY      posterolateral fusion with autograft bone and [place] mineralized bone matrix  13    at [facility] for lumbar spine stenosis    TOE SURGERY      TONSILLECTOMY      TRIGGER FINGER RELEASE         Referring physician: Dr. Lange  Date referred to OT: 2017    General Precautions: Standard, fall, NPO  Orthopedic Precautions: N/A  Braces: N/A    Patient History:  Living Environment  Lives With: alone  Living  Arrangements: condominium  Home Accessibility: other (see comments) (Elevator to 7th floor)  Home Layout: Able to live on 1st floor  Transportation Available: car, family or friend will provide  Living Environment Comment: Patient lives alone, and was independent with all self, commmunity, home and leisure axs.  Still driving.  Equipment Currently Used at Home: none (But has RW, BSC, walk-in shower, shower chair, and W/C)    Prior level of function:   Bed Mobility/Transfers: independent  Grooming: independent  Bathing: independent  Upper Body Dressing: independent  Lower Body Dressing: independent  Toileting: independent  Home Management Skills: independent  Homemaking Responsibilities: Yes  Driving License: Yes  Mode of Transportation: Car, Family  Occupation: Retired     Dominant hand: right    Subjective:  Communicated with nurse prior to session.    Chief Complaint: Nausea and headache  Patient/Family stated goals: Continuee to be independent    Pain Rating: 10/10        Location: head  Pain Addressed: Pre-medicate for activity, Distraction, Nurse notified  Pain Rating Post-Intervention: 10/10    Objective:  Patient found with: peripheral IV, PCA, telemetry, KOURTNEY drain    Cognitive Exam:  Oriented to: Person, Place, Time and Situation  Follows Commands/attention: Follows multistep  commands  Communication: clear/fluent  Memory:  No Deficits noted  Safety awareness/insight to disability: intact  Coping skills/emotional control: Appropriate to situation    Visual/perceptual:  Intact    Physical Exam:  Postural examination/scapula alignment: Rounded shoulder  Skin integrity: Redness  Edema: None noted BUEs    Sensation:   Intact    Upper Extremity Range of Motion:  Right Upper Extremity: WNL  Left Upper Extremity: WNL    Upper Extremity Strength:  Right Upper Extremity: WNL  Left Upper Extremity: WNL   Strength: WNL    Fine motor coordination:   Intact    Gross motor coordination: WFL    Functional Mobility:  Bed  Mobility:  Rolling/Turning to Left: Minimum assistance  Rolling/Turning Right: Minimum assistance  Scooting/Bridging: Minimum Assistance  Supine to Sit: Moderate Assistance    Transfers:  Sit <> Stand Assistance: Moderate Assistance  Sit <> Stand Assistive Device: Rolling Walker  Bed <> Chair Technique: Stand Pivot  Bed <> Chair Transfer Assistance: Minimum Assistance  Toilet Transfer Technique: Stand Pivot  Toilet Transfer Assistance: Moderate Assistance  Toilet Transfer Assistive Device: Rolling Walker    Functional Ambulation: SBA with RW    Activities of Daily Living:  Feeding Level of Assistance: Activity did not occur (NPO)  UE Dressing Level of Assistance: Minimum assistance  LE Dressing Level of Assistance: Total assistance  Grooming Position: Seated  Grooming Level of Assistance: Minimum assistance  Toileting Where Assessed: Toilet  Toileting Level of Assistance: Maximum assistance  Bathing Where Assessed: Sitting sinkside  Bathing Level of Assistance: Maximum assistance    Balance:   Static Sit: NORMAL: No deviations seen in posture held statically  Dynamic Sit: GOOD+: Maintains balance through MAXIMAL excursions of active trunk motion  Static Stand: GOOD-: Takes MODERATE challenges from all directions inconsistently  Dynamic stand: FAIR+: Needs CLOSE SUPERVISION during gait and is able to right self with minor LOB      AM-PAC 6 CLICK ADL  How much help from another person does this patient currently need?  1 = Unable, Total/Dependent Assistance  2 = A lot, Maximum/Moderate Assistance  3 = A little, Minimum/Contact Guard/Supervision  4 = None, Modified [place]/Independent    Putting on and taking off regular lower body clothing? : 1  Bathing (including washing, rinsing, drying)?: 2  Toileting, which includes using toilet, bedpan, or urinal? : 2  Putting on and taking off regular upper body clothing?: 2  Taking care of personal grooming such as brushing teeth?: 3  Eating meals?: 1 (NPO)  Total Score:  "11    AM-PAC Raw Score CMS "G-Code Modifier Level of Impairment Assistance   6 % Total / Unable   7 - 9 CM 80 - 100% Maximal Assist   10 - 14 CL 60 - 80% Moderate Assist   15 - 19 CK 40 - 60% Moderate Assist   20 - 22 CJ 20 - 40% Minimal Assist   23 CI 1-20% SBA / CGA   24 CH 0% Independent/ Mod I       Patient left up in chair with all lines intact, call button in reach and nurse and sister present    Assessment:  Anayeli Judd is a 72 y.o. female with a medical diagnosis of SBO (small bowel obstruction) and presents with decline in LB ADLs post exploratory lap and adhesion lysis.  Patient motivated to engage in ADLs and OOB ax, and should be safe to return home with family for supervision .    Rehab identified problem list/impairments: Rehab identified problem list/impairments: weakness, impaired endurance, impaired self care skills, impaired functional mobilty, gait instability, impaired balance, pain, impaired skin    Rehab potential is good.    Activity tolerance: Good    Discharge recommendations: Discharge Facility/Level Of Care Needs: home with home health     Barriers to discharge: Barriers to Discharge: None (Family available to provide 24/7 care as needed)    Equipment recommendations: none     GOALS:   Occupational Therapy Goals        Problem: Occupational Therapy Goal    Goal Priority Disciplines Outcome Interventions   Occupational Therapy Goal     OT, PT/OT Ongoing (interventions implemented as appropriate)    Description:  Goals to be met by: 5/1/2017    Patient will increase functional independence with ADLs by performing:    UE Dressing with Stand-by Assistance.  LE Dressing with Moderate Assistance.  Grooming while standing with Set-up Assistance.  Toileting from toilet with Minimal Assistance for hygiene and clothing management.   Bathing from  seated with Minimal Assistance.  Toilet transfer to toilet with Supervision.                PLAN:  Patient to be seen 4 x/week to address the " above listed problems via self-care/home management, therapeutic activities, therapeutic exercises  Plan of Care expires: 05/02/17  Plan of Care reviewed with: patient, daughter         Kadie CROUCH Jonn, OT  04/03/2017

## 2017-04-03 NOTE — PT/OT/SLP EVAL
Physical Therapy  Evaluation    Anayeli Judd   MRN: [id number]   Admitting Diagnosis: SBO (small bowel obstruction) s/p ex-lap    PT Received On: 17  PT Start Time: 1337     PT Stop Time: 1358    PT Total Time (min): 21 min       Billable Minutes:  Evaluation 21 Low Complexity    Diagnosis: SBO (small bowel obstruction)   Comorbidities that affect PT POC:  · Previous significant abdominal sx  · Spinal stenosis  · Raynaud's syndrome  · arthritis    Past Medical History:   Diagnosis Date    Allergic rhinitis     Arthritis     Blood transfusion     during delivery and     Bowel obstruction     Cervical radiculopathy     followed by dr cloud    Colon cancer     Diabetes mellitus     Diarrhea     Family history of breast cancer     Family history of colon cancer     Fatty liver     GERD (gastroesophageal reflux disease)     History of shingles     Hyperlipidemia     Hypothyroidism     Irritable bowel syndrome     Microscopic colitis     treated 2013    Raynaud phenomenon     Raynaud's disease       Past Surgical History:   Procedure Laterality Date    APPENDECTOMY      BACK SURGERY      CARPAL TUNNEL RELEASE      bilateral      SECTION      CHOLECYSTECTOMY      CHOLECYSTECTOMY      CHOLECYSTECTOMY  1965    COLECTOMY      tumor removal 201    HYSTERECTOMY      posterolateral fusion with autograft bone and [place] mineralized bone matrix  13    at [facility] for lumbar spine stenosis    TOE SURGERY      TONSILLECTOMY      TRIGGER FINGER RELEASE         Referring physician: Todd Lange MD  Date referred to PT: 2017    General Precautions: Standard, fall    Patient History:  Living Environment Comment: Patient lives alone in a 7th floor condo with elevator access.  SHe was completely independent prior to admit, active and with a strong support system.  She owns a RW, BSC, walk-in shower, shower chair, and W/C)  Equipment Currently Used at Home:  "none    Subjective:  Communicated with RN prior to session.  Attempted to see patient in the am, but she c/o of significant n/v and exhaustion from participating in OT evaluation.   "I am feeling a little better this afternoon.  I was waiting for you to come back so we could walk."  Chief Complaint: nausea  Patient goals: return to PLOF    Objective:   Patient found with:  (IV, G-tube, KOURTNEY drain)  Patient found sitting up in chair with multiple visitors in the room.  She agreed to participate in therapy session.  Patient actively participating in donning gown and standing with RW to speak with physician prior to gait.       Cognitive Exam:  Oriented to: Person, Place, Time and Situation    Follows Commands/attention: Follows multistep  commands  Communication: clear/fluent  Safety awareness/insight to disability: intact    Physical Exam:  Postural examination/scapula alignment: Rounded shoulder and Head forward    Skin integrity: Visible skin intact, incision covered with dressing  Edema: None noted     Sensation:   Intact    Lower Extremity Range of Motion:  Right Lower Extremity: WFL  Left Lower Extremity: WFL    Lower Extremity Strength:  Right Lower Extremity: WFL  Left Lower Extremity: WFL      Gross motor coordination: WFL    Functional Mobility:  Bed Mobility:  Rolling/Turning to Left: Stand by assistance  Rolling/Turning Right: Stand by assistance  Supine to Sit: Minimum Assistance  Sit to Supine: Minimum Assistance    Transfers:  Sit <> Stand Assistance: Stand By Assistance    Gait:   Gait Distance: 150 feet SBA with RW and decreased anant, but no other significant gait deviations.     Balance:   Static Sit: GOOD: Takes MODERATE challenges from all directions  Dynamic Sit: FAIR+: Maintains balance through MINIMAL excursions of active trunk motion  Static Stand: FAIR+: Takes MINIMAL challenges from all directions  Dynamic stand: GOOD-: Needs SUPERVISION only during gait and able to self right with moderate "     Therapeutic Activities and Exercises:  Therapist educated patient on the following:  · Role of PT and POC  · Ambulation 3x/day with RW and assist  · OOBTC schedule  · Abdominal protection during bed mobility    AM-PAC 6 CLICK MOBILITY  How much help from another person does this patient currently need?   1 = Unable, Total/Dependent Assistance  2 = A lot, Maximum/Moderate Assistance  3 = A little, Minimum/Contact Guard/Supervision  4 = None, Modified [place]/Independent    Turning over in bed (including adjusting bedclothes, sheets and blankets)?: 3  Sitting down on and standing up from a chair with arms (e.g., wheelchair, bedside commode, etc.): 4  Moving from lying on back to sitting on the side of the bed?: 3  Moving to and from a bed to a chair (including a wheelchair)?: 3  Need to walk in hospital room?: 3  Climbing 3-5 steps with a railing?: 3  Total Score: 19     AM-PAC Raw Score CMS G-Code Modifier Level of Impairment Assistance   6 % Total / Unable   7 - 9 CM 80 - 100% Maximal Assist   10 - 14 CL 60 - 80% Moderate Assist   15 - 19 CK 40 - 60% Moderate Assist   20 - 22 CJ 20 - 40% Minimal Assist   23 CI 1-20% SBA / CGA   24 CH 0% Independent/ Mod I     Patient left supine with all lines intact, call button in reach and multiple visitors present present.    Assessment:   Anayeli Judd is a 72 y.o. female with a medical diagnosis of SBO (small bowel obstruction) and presents with decreased functional mobility.  Patient was independent prior to admit and did not use an assistive device.  Patient now ambulating 150 feet SBA with RW for assistance.  She has the potential to return to [facility] with additional therapy.  Pt would benefit from skilled PT services to progress mobility as tolerated. Pt states she will have assistance if needed once discharged.    Rehab identified problem list/impairments: Rehab identified problem list/impairments: impaired functional mobilty, pain, impaired  skin    Rehab potential is excellent.    Activity tolerance: Good    Discharge recommendations: Discharge Facility/Level Of Care Needs: home with home health     Barriers to discharge: Barriers to Discharge: None    Equipment recommendations: Equipment Needed After Discharge: none     GOALS:   Physical Therapy Goals        Problem: Physical Therapy Goal    Goal Priority Disciplines Outcome Goal Variances Interventions   Physical Therapy Goal     PT/OT, PT Ongoing (interventions implemented as appropriate)     Description:  Goals to be met by: 2017     Patient will increase functional independence with mobility by performin. Supine to sit with Stand-by Assistance  2. Sit to supine with Stand-by Assistance  3. Bed to chair transfer with Modified [place] using Rolling Walker  4. Gait  x 150 feet with [place] using no assistive device.                 PLAN:    Patient to be seen 4 x/week to address the above listed problems via gait training, therapeutic activities, therapeutic exercises  Plan of Care expires: 17  Plan of Care reviewed with: patient          Ritika Philip, PT  2017

## 2017-04-03 NOTE — PROGRESS NOTES
Notified Dr. Mcclain about critical lactic acid of 4.3. MD ordered 1L NS bolus and VS to be taken at this time. Will continue to monitor.

## 2017-04-03 NOTE — PROGRESS NOTES
"Pt c/o chills and "not feeling well." Daughter states that she is having episodes of confusion, mistaking her daughter for someone else and asking if their dog was in the hospital room. HR at this time is 122, otherwise VSS, see flowsheet for details. Pt c/o generalized abdominal pain, but not different or more severe than the post op pain that she has been experiencing. Notified Dr. Johny MD to bedside. STAT labs and KUB ordered. Will continue to monitor.  "

## 2017-04-03 NOTE — PLAN OF CARE
[facility]    HOME HEALTH ORDERS  FACE TO FACE ENCOUNTER    Patient Name: Anayeli Judd  YOB: 1945    PCP: Rocio Mc MD   PCP Address: [address] [facility] [address] / [facility] [address]  PCP Phone Number: [phone]  PCP Fax: [phone]    Discharging Team(s): Data Unavailable    Encounter Date: 04/03/2017    Admit to Home Health    Diagnoses:  Active Hospital Problems    Diagnosis  POA    *SBO (small bowel obstruction) [K56.69]  Yes      Resolved Hospital Problems    Diagnosis Date Resolved POA   No resolved problems to display.       Future Appointments  Date Time Provider Department Center   8/21/2017 10:30 AM Db Epstein MD [facility] UROLOGY [facility]     Follow-up Information     Follow up with Joshua Goldberg, MD.    Specialty:  General Surgery    Contact information:    [address]  [place] [address]  [phone]              I have seen and examined this patient face to face today. My clinical findings that support the need for the home health skilled services and home bound status are the following:  Medical restrictions requiring assistance of another human to leave home due to  Post surgery monitoring.    Allergies:  Review of patient's allergies indicates:   Allergen Reactions    Codeine Nausea And Vomiting     Other reaction(s): Itching    Percocet  [oxycodone-acetaminophen]      Other reaction(s): Itching    Sulfa (sulfonamide antibiotics)      Other reaction(s): Nausea  Other reaction(s): Itching    Adhesive Rash       Diet: regular diet    Activities: no heavy lifting, nothing heavier than 10lbs    Nursing:   SN to complete comprehensive assessment including routine vital signs. Instruct on disease process and s/s of complications to report to MD. Review/verify medication list sent home with the patient at time of discharge  and instruct patient/caregiver as needed. Frequency may be adjusted depending on start of care  date.    Notify MD if SBP > 160 or < 90; DBP > 90 or < 50; HR > 120 or < 50; Temp > 101      CONSULTS:    Physical Therapy to evaluate and treat. Evaluate for home safety and equipment needs; Establish/upgrade home exercise program. Perform / instruct on therapeutic exercises, gait training, transfer training, and Range of Motion.  Occupational Therapy to evaluate and treat. Evaluate home environment for safety and equipment needs. Perform/Instruct on transfers, ADL training, ROM, and therapeutic exercises.  Aide to provide assistance with personal care, ADLs, and vital signs.    Medications: Review discharge medications with patient and family and provide education.      Current Discharge Medication List      CONTINUE these medications which have NOT CHANGED    Details   amoxicillin-clavulanate 1,000-62.5 mg (AUGMENTIN XR) 1,000-62.5 mg per tablet Take 2 tablets by mouth 2 (two) times daily.      atorvastatin (LIPITOR) 40 MG tablet take 1 tablet by mouth once daily  Qty: 90 tablet, Refills: 1    Associated Diagnoses: Hyperlipidemia      azelastine (ASTELIN) 137 mcg nasal spray 1 spray (137 mcg total) by Nasal route 2 (two) times daily.  Qty: 30 mL, Refills: 1    Associated Diagnoses: Rhinitis      blood sugar diagnostic Strp Dispense madie contour strips; test blood sugar once daily  Qty: 100 strip, Refills: 11    Associated Diagnoses: Diabetes mellitus, type 2      blood-glucose meter (CONTOUR METER) kit Please dispense Madie Contour meter and supplies Use as instructed Test Blood sugar once daily  Qty: 1 each, Refills: 0    Associated Diagnoses: Type 2 diabetes mellitus without complication      ciprofloxacin HCl (CIPRO) 250 MG tablet Refills: 0      conjugated estrogens (PREMARIN) vaginal cream Place 0.5 g vaginally twice a week.  Qty: 1 applicator, Refills: 3      fluticasone (FLONASE) 50 mcg/actuation nasal spray 2 sprays by Each Nare route once daily.  Refills: 0      DARNELL ROOT (DARNELL EXTRACT ORAL) Take by  mouth.      hydrocortisone 2.5 % cream Refills: 0      ipratropium (ATROVENT) 0.06 % nasal spray Refills: 0      lancets (ONE TOUCH ULTRASOFT LANCETS) Misc Test blood sugar once daily  Qty: 200 each, Refills: 11    Associated Diagnoses: Diabetes mellitus, type 2      levothyroxine (SYNTHROID) 88 MCG tablet Take 1 tablet (88 mcg total) by mouth before breakfast. 1 Tablet Oral Every morning  Qty: 90 tablet, Refills: 4    Associated Diagnoses: Hypothyroidism due to acquired atrophy of thyroid      LYRICA 100 mg capsule take 1 capsule by mouth twice a day  Qty: 180 capsule, Refills: 3      magnesium 30 mg Tab Take 500 capsules by mouth. Patient's taking magnesium 500mg QD      meloxicam (MOBIC) 15 MG tablet Take 1 tablet (15 mg total) by mouth daily as needed for Pain. With food for arthritis  Qty: 90 tablet, Refills: 1      metformin (GLUCOPHAGE-XR) 750 MG 24 hr tablet Take 1 tablet (750 mg total) by mouth 2 (two) times daily with meals.  Qty: 180 tablet, Refills: 1      ondansetron (ZOFRAN) 8 MG tablet Take 1 tablet (8 mg total) by mouth every 8 (eight) hours as needed for Nausea.  Qty: 20 tablet, Refills: 0      ospemifene (OSPHENA) 60 mg Tab Take 60 mg by mouth once daily.  Qty: 90 tablet, Refills: 1      tramadol (ULTRAM) 50 mg tablet Take 50 mg by mouth every 12 (twelve) hours.  Refills: 0      turmeric root extract 500 mg Cap Take by mouth.      vitamin D 1000 units Tab Take 185 mg by mouth once daily. D3             I certify that this patient is confined to her home and needs intermittent skilled nursing care, physical therapy and occupational therapy.

## 2017-04-03 NOTE — PROGRESS NOTES
When waking pt up to do midnight vitals, pt sat up in bed and projectile vomited across room. Emesis resembled output from G tube and was thin, green, and malodorous. Pt shaking at this time and complaining of chills. HR in 120s, all other VSS. Flushed G tube with 30cc and received 100cc back. Notified Dr. Mcclain. 0000 lactic acid drawn. MD to bedside to assess pt. Will continue to monitor.

## 2017-04-03 NOTE — CONSULTS
Single lumen 18g x 10cm midline placed to right brachial vein. Max dwell date 5/1/17, Lot# [id number].  Needle advanced into the vessel under real time ultrasound guidance.  Image recorded and saved.

## 2017-04-03 NOTE — PLAN OF CARE
Problem: Patient Care Overview  Goal: Plan of Care Review  I went over the plan of care with the patient and her family. The patient complained of a headache which was relieved with tylenol. The patient sat up in the chair and ambulated in the espitia with therapy. I ordered a rolling walker to assist her with ambulation thereafter. The patient G-tube is to gravity and I flushed it every 6 hours. The patient remained free of falls and skin breakdown.

## 2017-04-03 NOTE — PLAN OF CARE
Problem: Physical Therapy Goal  Goal: Physical Therapy Goal  Goals to be met by: 2017     Patient will increase functional independence with mobility by performin. Supine to sit with Stand-by Assistance  2. Sit to supine with Stand-by Assistance  3. Bed to chair transfer with Modified [place] using Rolling Walker  4. Gait x 150 feet with [place] using no assistive device.   Outcome: Ongoing (interventions implemented as appropriate)  Initial eval completed. Results, goals and POC discussed with patient.

## 2017-04-03 NOTE — PLAN OF CARE
Problem: Patient Care Overview  Goal: Plan of Care Review  Outcome: Ongoing (interventions implemented as appropriate)  Pt AAOx4. See previous notes for pt VS. Pt remains afebrile. NSR-ST max 120s on telemetry. Pain controlled with dilaudid PCA. G tube to gravity with yellow output, flushed q6h and PRN for nausea. Urine output measured per kam remains adequate for shift. Pt received 4L bolus total during shift, see previous notes. One episode of emesis. Pt is free from falls/injury/trauma. SCD/TEDs in place. Repositioned in bed with assist. Call bell within reach, sleeping between care. No acute events overnight, will continue to monitor.

## 2017-04-03 NOTE — PROGRESS NOTES
Notified Dr. Mcclain of automatic 86/47 BP, manual 86/46, and . Only complaint from pt is nausea. G tube flushed with 20 cc sterile water, 550cc output came back. Dr. Mcclain to bedside, 1L NS bolus ordered. Will continue to monitor.

## 2017-04-03 NOTE — PROGRESS NOTES
Called to bedside for chills and confusion. Vitals at that time were , .74, afebrile. Patient alert, oriented and conversing appropriately. Denied abdominal pain. She was shaking but did not feel chilled. She was anxious and stopped shaking when she calmed down. Labs were ordered which were significant for lactate of 4.3 and SONNY. KUB was unremarkable. Normal saline bolus was given with improvement in heart rate to 108 and an increase in her UOP.  Called back to bedside for patient projectile vomiting foul smelling emesis. G tube is to gravity and continues to drain >1L. Patient's vitals at this time were , /60, afebrile. She was complaining of significant back pain and an inability to get comfortable. This is the same back pain which she has chronically. She denied abdominal pain or nausea. Abdominal exam throughout has remained soft, nontender, nondistended. A repeat lactate was elevated to 5.9. Patient again examined, now resting comfortably after being placed on wedge for back pain.  Discussed with primary team who are aware.   Continue volume resuscitation. Will start Cipro and Flagyl. Continue tele monitoring, continue flushing G tube q6h. Acetaminophen for back pain.    Johanny Mcclain MD, PGY-1  General Surgery  [phone]

## 2017-04-03 NOTE — PROGRESS NOTES
[facility]  General Surgery  Progress Note    Subjective:     Interval History:   Patient seen and examined, had some vomiting overnight with hypotension - received 4 boluses, started on abx; pain well managed with PCA, G-tube to gravity, put out 1875mL overnight     Post-Op Info:  Procedure(s) (LRB):  EXPLORATORY-LAPAROTOMY (N/A)  REPAIR  LYSIS-ADHESION (N/A)  PLACEMENT   2 Days Post-Op      Medications:  Continuous Infusions:   hydromorphone in 0.9 % NaCl 6 mg/30 ml      lactated ringers 125 mL/hr at 04/01/17 1129     Scheduled Meds:   enoxaparin  40 mg Subcutaneous Q24H    pantoprazole  40 mg Intravenous Daily    sodium chloride 0.9%  3 mL Intravenous Q8H     PRN Meds:dextrose 50%, diphenhydrAMINE, glucagon (human recombinant), insulin aspart, naloxone, ondansetron, promethazine (PHENERGAN) IVPB     Objective:     Vital Signs (Most Recent):  Temp: 97.8 °F (36.6 °C) (04/03/17 0329)  Pulse: 94 (04/03/17 0700)  Resp: 18 (04/03/17 0329)  BP: (!) 93/54 (04/03/17 0613)  SpO2: (!) 94 % (04/03/17 0329) Vital Signs (24h Range):  Temp:  [97.1 °F (36.2 °C)-98.5 °F (36.9 °C)] 97.8 °F (36.6 °C)  Pulse:  [] 94  Resp:  [16-20] 18  SpO2:  [93 %-95 %] 94 %  BP: ()/(46-74) 93/54       Intake/Output Summary (Last 24 hours) at 04/03/17 0904  Last data filed at 04/03/17 0700   Gross per 24 hour   Intake          7178.75 ml   Output             3000 ml   Net          4178.75 ml       Physical Exam   Constitutional: She appears well-developed and well-nourished. No distress.   HENT:   Head: Normocephalic and atraumatic.   Cardiovascular: Normal rate and regular rhythm.    Pulmonary/Chest: Effort normal. No respiratory distress.   Abdominal:   Soft, appropriate TTP, non-distended, incision - staples in place, clean dry and intact  G-tube in place with bilious output       Significant Labs:  BMP:   Recent Labs  Lab 04/03/17  0530   GLU 96      K 3.4*   *   CO2 17*   BUN 23   CREATININE 1.2    CALCIUM 7.0*   MG 2.3     CBC:   Recent Labs  Lab 04/02/17 2114 04/03/17  0530   WBC 3.04*  --    RBC 4.07 3.09*   HGB 12.5 9.4*   HCT 38.0 27.6*   * 87*   MCV 93 89   MCH 30.7 30.4   MCHC 32.9 34.1     CMP:   Recent Labs  Lab 04/03/17  0530   GLU 96   CALCIUM 7.0*   ALBUMIN 2.1*   PROT 4.7*      K 3.4*   CO2 17*   *   BUN 23   CREATININE 1.2   ALKPHOS 38*   ALT 13   AST 30   BILITOT 1.2*       Lactic Acid:   Recent Labs  Lab 04/03/17  0530   LACTATE 3.3*     LFTs:   Recent Labs  Lab 04/03/17  0530   ALT 13   AST 30   ALKPHOS 38*   BILITOT 1.2*   PROT 4.7*   ALBUMIN 2.1*     All pertinent labs from the last 24 hours have been reviewed.    Assessment/Plan:   73 yo female s/p ex lap with TIFFANY and G-tube placement    S/p ex lap  -continue NPO, IVF - will give bolus this afternoon  -continue PCA  -will d/c kam  -will d/c abx  -nausea meds PRN    DM  -home meds, insulin    Hypokalemia, hypophosphatemia  -replace      Sanjuana Redding PA-C   [id number]  General Surgery  [facility]

## 2017-04-04 LAB
ALBUMIN SERPL BCP-MCNC: 1.9 G/DL
ALBUMIN SERPL BCP-MCNC: 2 G/DL
ALP SERPL-CCNC: 38 U/L
ALP SERPL-CCNC: 39 U/L
ALT SERPL W/O P-5'-P-CCNC: 15 U/L
ALT SERPL W/O P-5'-P-CCNC: 17 U/L
ANION GAP SERPL CALC-SCNC: 8 MMOL/L
ANION GAP SERPL CALC-SCNC: 9 MMOL/L
ANISOCYTOSIS BLD QL SMEAR: SLIGHT
ANISOCYTOSIS BLD QL SMEAR: SLIGHT
AST SERPL-CCNC: 38 U/L
AST SERPL-CCNC: 39 U/L
BASOPHILS # BLD AUTO: 0.01 K/UL
BASOPHILS # BLD AUTO: 0.01 K/UL
BASOPHILS NFR BLD: 0.2 %
BASOPHILS NFR BLD: 0.2 %
BILIRUB SERPL-MCNC: 0.7 MG/DL
BILIRUB SERPL-MCNC: 0.8 MG/DL
BUN SERPL-MCNC: 17 MG/DL
BUN SERPL-MCNC: 19 MG/DL
BURR CELLS BLD QL SMEAR: ABNORMAL
CALCIUM SERPL-MCNC: 7.1 MG/DL
CALCIUM SERPL-MCNC: 7.2 MG/DL
CHLORIDE SERPL-SCNC: 108 MMOL/L
CHLORIDE SERPL-SCNC: 111 MMOL/L
CO2 SERPL-SCNC: 19 MMOL/L
CO2 SERPL-SCNC: 21 MMOL/L
CREAT SERPL-MCNC: 0.7 MG/DL
CREAT SERPL-MCNC: 0.7 MG/DL
DIFFERENTIAL METHOD: ABNORMAL
DIFFERENTIAL METHOD: ABNORMAL
EOSINOPHIL # BLD AUTO: 0.2 K/UL
EOSINOPHIL # BLD AUTO: 0.3 K/UL
EOSINOPHIL NFR BLD: 5.1 %
EOSINOPHIL NFR BLD: 5.6 %
ERYTHROCYTE [DISTWIDTH] IN BLOOD BY AUTOMATED COUNT: 13.2 %
ERYTHROCYTE [DISTWIDTH] IN BLOOD BY AUTOMATED COUNT: 13.3 %
EST. GFR  (AFRICAN AMERICAN): >60 ML/MIN/1.73 M^2
EST. GFR  (AFRICAN AMERICAN): >60 ML/MIN/1.73 M^2
EST. GFR  (NON AFRICAN AMERICAN): >60 ML/MIN/1.73 M^2
EST. GFR  (NON AFRICAN AMERICAN): >60 ML/MIN/1.73 M^2
GLUCOSE SERPL-MCNC: 113 MG/DL
GLUCOSE SERPL-MCNC: 119 MG/DL
HCT VFR BLD AUTO: 26 %
HCT VFR BLD AUTO: 26.3 %
HGB BLD-MCNC: 8.5 G/DL
HGB BLD-MCNC: 8.7 G/DL
HYPOCHROMIA BLD QL SMEAR: ABNORMAL
HYPOCHROMIA BLD QL SMEAR: ABNORMAL
LACTATE SERPL-SCNC: 1.4 MMOL/L
LYMPHOCYTES # BLD AUTO: 1.1 K/UL
LYMPHOCYTES # BLD AUTO: 1.4 K/UL
LYMPHOCYTES NFR BLD: 24.1 %
LYMPHOCYTES NFR BLD: 24.2 %
MAGNESIUM SERPL-MCNC: 1.8 MG/DL
MCH RBC QN AUTO: 29.8 PG
MCH RBC QN AUTO: 30 PG
MCHC RBC AUTO-ENTMCNC: 32.7 %
MCHC RBC AUTO-ENTMCNC: 33.1 %
MCV RBC AUTO: 91 FL
MCV RBC AUTO: 91 FL
MONOCYTES # BLD AUTO: 0.7 K/UL
MONOCYTES # BLD AUTO: 0.9 K/UL
MONOCYTES NFR BLD: 12.1 %
MONOCYTES NFR BLD: 19.4 %
NEUTROPHILS # BLD AUTO: 2.3 K/UL
NEUTROPHILS # BLD AUTO: 3.3 K/UL
NEUTROPHILS NFR BLD: 51.1 %
NEUTROPHILS NFR BLD: 58 %
OVALOCYTES BLD QL SMEAR: ABNORMAL
PHOSPHATE SERPL-MCNC: 1.5 MG/DL
PLATELET # BLD AUTO: 106 K/UL
PLATELET # BLD AUTO: 119 K/UL
PLATELET BLD QL SMEAR: ABNORMAL
PMV BLD AUTO: 11.2 FL
PMV BLD AUTO: 11.9 FL
POCT GLUCOSE: 136 MG/DL (ref 70–110)
POCT GLUCOSE: 188 MG/DL (ref 70–110)
POCT GLUCOSE: 94 MG/DL (ref 70–110)
POIKILOCYTOSIS BLD QL SMEAR: SLIGHT
POLYCHROMASIA BLD QL SMEAR: ABNORMAL
POLYCHROMASIA BLD QL SMEAR: ABNORMAL
POTASSIUM SERPL-SCNC: 3.8 MMOL/L
POTASSIUM SERPL-SCNC: 4.4 MMOL/L
PREALB SERPL-MCNC: 4 MG/DL
PROT SERPL-MCNC: 4.8 G/DL
PROT SERPL-MCNC: 5.3 G/DL
RBC # BLD AUTO: 2.85 M/UL
RBC # BLD AUTO: 2.9 M/UL
SODIUM SERPL-SCNC: 137 MMOL/L
SODIUM SERPL-SCNC: 139 MMOL/L
WBC # BLD AUTO: 4.54 K/UL
WBC # BLD AUTO: 5.72 K/UL

## 2017-04-04 PROCEDURE — 99900035 HC TECH TIME PER 15 MIN (STAT)

## 2017-04-04 PROCEDURE — 83735 ASSAY OF MAGNESIUM: CPT

## 2017-04-04 PROCEDURE — 84100 ASSAY OF PHOSPHORUS: CPT

## 2017-04-04 PROCEDURE — 80053 COMPREHEN METABOLIC PANEL: CPT

## 2017-04-04 PROCEDURE — 63600175 PHARM REV CODE 636 W HCPCS: Performed by: PHYSICIAN ASSISTANT

## 2017-04-04 PROCEDURE — 83605 ASSAY OF LACTIC ACID: CPT

## 2017-04-04 PROCEDURE — C9113 INJ PANTOPRAZOLE SODIUM, VIA: HCPCS | Performed by: SURGERY

## 2017-04-04 PROCEDURE — 20600001 HC STEP DOWN PRIVATE ROOM

## 2017-04-04 PROCEDURE — 63600175 PHARM REV CODE 636 W HCPCS: Performed by: SURGERY

## 2017-04-04 PROCEDURE — 36415 COLL VENOUS BLD VENIPUNCTURE: CPT

## 2017-04-04 PROCEDURE — 25000003 PHARM REV CODE 250: Performed by: PHYSICIAN ASSISTANT

## 2017-04-04 PROCEDURE — 25000003 PHARM REV CODE 250: Performed by: SURGERY

## 2017-04-04 PROCEDURE — 80053 COMPREHEN METABOLIC PANEL: CPT | Mod: 91

## 2017-04-04 PROCEDURE — 84134 ASSAY OF PREALBUMIN: CPT

## 2017-04-04 PROCEDURE — 85025 COMPLETE CBC W/AUTO DIFF WBC: CPT

## 2017-04-04 RX ORDER — MAGNESIUM SULFATE HEPTAHYDRATE 40 MG/ML
2 INJECTION, SOLUTION INTRAVENOUS ONCE
Status: COMPLETED | OUTPATIENT
Start: 2017-04-04 | End: 2017-04-04

## 2017-04-04 RX ADMIN — DIPHENHYDRAMINE HYDROCHLORIDE 12.5 MG: 50 INJECTION, SOLUTION INTRAMUSCULAR; INTRAVENOUS at 06:04

## 2017-04-04 RX ADMIN — POTASSIUM PHOSPHATE, MONOBASIC AND POTASSIUM PHOSPHATE, DIBASIC 30 MMOL: 224; 236 INJECTION, SOLUTION, CONCENTRATE INTRAVENOUS at 08:04

## 2017-04-04 RX ADMIN — SODIUM CHLORIDE, SODIUM LACTATE, POTASSIUM CHLORIDE, AND CALCIUM CHLORIDE: 600; 310; 30; 20 INJECTION, SOLUTION INTRAVENOUS at 03:04

## 2017-04-04 RX ADMIN — Medication 3 ML: at 06:04

## 2017-04-04 RX ADMIN — Medication 1 CAPSULE: at 08:04

## 2017-04-04 RX ADMIN — Medication 3 ML: at 10:04

## 2017-04-04 RX ADMIN — PANTOPRAZOLE SODIUM 40 MG: 40 INJECTION, POWDER, FOR SOLUTION INTRAVENOUS at 08:04

## 2017-04-04 RX ADMIN — MAGNESIUM SULFATE IN WATER 2 G: 40 INJECTION, SOLUTION INTRAVENOUS at 08:04

## 2017-04-04 RX ADMIN — ENOXAPARIN SODIUM 40 MG: 100 INJECTION SUBCUTANEOUS at 04:04

## 2017-04-04 RX ADMIN — Medication: at 08:04

## 2017-04-04 NOTE — PLAN OF CARE
Problem: Patient Care Overview  Goal: Plan of Care Review  Outcome: Ongoing (interventions implemented as appropriate)  POC reviewed with pt and daughter at bedside. Pt AAOx4, VSS, afebrile, NSR on tele. SpO2 > 92% on rm air. ML ROSANNE with staples, CDI. G tube drainage to gravity, flushed with 30cc water q2h. Pt complains of mild nausea, prn meds given. KOURTNEY drain to right abdomen CDI, output monitored. Pt remains NPO except ice. Pt complains of mild pain, Pain pump in use. Pt urinates adequately per hat. Up with one person assist.  Acuchecks done q6h, no coverage needed. Pt remains free from falls and injuries, will continue to monitor.

## 2017-04-04 NOTE — PROGRESS NOTES
[facility]  General Surgery  Progress Note    Subjective:     Interval History:   Patient seen and examined, had a good night, tolerating ice chips with g-tube to gravity - put out 1425mL overnight, denies N/V/F/BM; pain well managed with PCA    Post-Op Info:  Procedure(s) (LRB):  EXPLORATORY-LAPAROTOMY (N/A)  REPAIR  LYSIS-ADHESION (N/A)  PLACEMENT   3 Days Post-Op      Medications:  Continuous Infusions:   hydromorphone in 0.9 % NaCl 6 mg/30 ml      lactated ringers 150 mL/hr at 04/03/17 1418     Scheduled Meds:   enoxaparin  40 mg Subcutaneous Q24H    Lactobacillus rhamnosus GG  1 capsule Oral Daily    pantoprazole  40 mg Intravenous Daily    sodium chloride 0.9%  3 mL Intravenous Q8H     PRN Meds:dextrose 50%, diphenhydrAMINE, glucagon (human recombinant), insulin aspart, naloxone, ondansetron, promethazine (PHENERGAN) IVPB     Objective:     Vital Signs (Most Recent):  Temp: 99 °F (37.2 °C) (04/04/17 0410)  Pulse: 90 (04/04/17 0700)  Resp: 17 (04/04/17 0410)  BP: (!) 108/54 (04/04/17 0410)  SpO2: 96 % (04/04/17 0410) Vital Signs (24h Range):  Temp:  [97.4 °F (36.3 °C)-99.7 °F (37.6 °C)] 99 °F (37.2 °C)  Pulse:  [] 90  Resp:  [17-19] 17  SpO2:  [94 %-96 %] 96 %  BP: ()/(53-58) 108/54       Intake/Output Summary (Last 24 hours) at 04/04/17 0750  Last data filed at 04/04/17 0413   Gross per 24 hour   Intake           3570.5 ml   Output             2050 ml   Net           1520.5 ml       Physical Exam   Constitutional: She appears well-developed and well-nourished. No distress.   HENT:   Head: Normocephalic and atraumatic.   Cardiovascular: Normal rate and regular rhythm.    Pulmonary/Chest: Effort normal. No respiratory distress.   Abdominal:   Soft, appropriate TTP, non-distended, incision - staples in place, clean dry and intact  G-tube in place with bilious output       Significant Labs:  BMP:     Recent Labs  Lab 04/04/17  0357   *      K 3.8   *   CO2  19*   BUN 19   CREATININE 0.7   CALCIUM 7.1*   MG 1.8     CBC:     Recent Labs  Lab 04/04/17 0357   WBC 4.54   RBC 2.85*   HGB 8.5*   HCT 26.0*   *   MCV 91   MCH 29.8   MCHC 32.7     CMP:     Recent Labs  Lab 04/04/17 0357   *   CALCIUM 7.1*   ALBUMIN 1.9*   PROT 4.8*      K 3.8   CO2 19*   *   BUN 19   CREATININE 0.7   ALKPHOS 38*   ALT 15   AST 38   BILITOT 0.8       Lactic Acid:     Recent Labs  Lab 04/04/17 0357   LACTATE 1.4     LFTs:     Recent Labs  Lab 04/04/17 0357   ALT 15   AST 38   ALKPHOS 38*   BILITOT 0.8   PROT 4.8*   ALBUMIN 1.9*     All pertinent labs from the last 24 hours have been reviewed.    Assessment/Plan:   73 yo female s/p ex lap with TIFFANY and G-tube placement    S/p ex lap  -continue NPO, IVF - may consider TPN soon  -continue PCA  -nausea meds PRN    DM  -home meds, insulin    Hypokalemia, hypophosphatemia, hypomagnesemia  -replace      Sanjuana Redding PA-C   [id number]  General Surgery  [facility]

## 2017-04-04 NOTE — PLAN OF CARE
Problem: Patient Care Overview  Goal: Plan of Care Review  I went over the plan of care with the patient and her family. The patient sat up in the chair most of the day. Flushed G-tube q 2 hours. Pain controlled with PCA. The patient remained free of falls and skin breakdown.

## 2017-04-05 LAB
ALBUMIN SERPL BCP-MCNC: 1.6 G/DL
ALP SERPL-CCNC: 32 U/L
ALT SERPL W/O P-5'-P-CCNC: 12 U/L
ANION GAP SERPL CALC-SCNC: 7 MMOL/L
ANISOCYTOSIS BLD QL SMEAR: SLIGHT
AST SERPL-CCNC: 23 U/L
BASOPHILS # BLD AUTO: ABNORMAL K/UL
BASOPHILS NFR BLD: 0 %
BILIRUB SERPL-MCNC: 0.8 MG/DL
BUN SERPL-MCNC: 13 MG/DL
CALCIUM SERPL-MCNC: 7 MG/DL
CHLORIDE SERPL-SCNC: 105 MMOL/L
CO2 SERPL-SCNC: 24 MMOL/L
CREAT SERPL-MCNC: 0.6 MG/DL
DIFFERENTIAL METHOD: ABNORMAL
EOSINOPHIL # BLD AUTO: ABNORMAL K/UL
EOSINOPHIL NFR BLD: 10 %
ERYTHROCYTE [DISTWIDTH] IN BLOOD BY AUTOMATED COUNT: 13.4 %
EST. GFR  (AFRICAN AMERICAN): >60 ML/MIN/1.73 M^2
EST. GFR  (NON AFRICAN AMERICAN): >60 ML/MIN/1.73 M^2
GLUCOSE SERPL-MCNC: 107 MG/DL
HCT VFR BLD AUTO: 23.7 %
HGB BLD-MCNC: 8 G/DL
LYMPHOCYTES # BLD AUTO: ABNORMAL K/UL
LYMPHOCYTES NFR BLD: 24 %
MAGNESIUM SERPL-MCNC: 1.8 MG/DL
MCH RBC QN AUTO: 30.1 PG
MCHC RBC AUTO-ENTMCNC: 33.8 %
MCV RBC AUTO: 89 FL
MONOCYTES # BLD AUTO: ABNORMAL K/UL
MONOCYTES NFR BLD: 11 %
NEUTROPHILS NFR BLD: 54 %
NEUTS BAND NFR BLD MANUAL: 1 %
PHOSPHATE SERPL-MCNC: 1.2 MG/DL
PLATELET # BLD AUTO: 106 K/UL
PLATELET BLD QL SMEAR: ABNORMAL
PMV BLD AUTO: 11.6 FL
POCT GLUCOSE: 120 MG/DL (ref 70–110)
POCT GLUCOSE: 121 MG/DL (ref 70–110)
POCT GLUCOSE: 82 MG/DL (ref 70–110)
POCT GLUCOSE: 82 MG/DL (ref 70–110)
POTASSIUM SERPL-SCNC: 3.8 MMOL/L
PROT SERPL-MCNC: 4.4 G/DL
RBC # BLD AUTO: 2.66 M/UL
SODIUM SERPL-SCNC: 136 MMOL/L
WBC # BLD AUTO: 6.54 K/UL

## 2017-04-05 PROCEDURE — 80053 COMPREHEN METABOLIC PANEL: CPT

## 2017-04-05 PROCEDURE — C1751 CATH, INF, PER/CENT/MIDLINE: HCPCS

## 2017-04-05 PROCEDURE — 94664 DEMO&/EVAL PT USE INHALER: CPT

## 2017-04-05 PROCEDURE — 97116 GAIT TRAINING THERAPY: CPT

## 2017-04-05 PROCEDURE — 25000003 PHARM REV CODE 250: Performed by: STUDENT IN AN ORGANIZED HEALTH CARE EDUCATION/TRAINING PROGRAM

## 2017-04-05 PROCEDURE — 02HV33Z INSERTION OF INFUSION DEVICE INTO SUPERIOR VENA CAVA, PERCUTANEOUS APPROACH: ICD-10-PCS | Performed by: SURGERY

## 2017-04-05 PROCEDURE — 85027 COMPLETE CBC AUTOMATED: CPT

## 2017-04-05 PROCEDURE — 20600001 HC STEP DOWN PRIVATE ROOM

## 2017-04-05 PROCEDURE — 36415 COLL VENOUS BLD VENIPUNCTURE: CPT

## 2017-04-05 PROCEDURE — 83735 ASSAY OF MAGNESIUM: CPT

## 2017-04-05 PROCEDURE — 36569 INSJ PICC 5 YR+ W/O IMAGING: CPT

## 2017-04-05 PROCEDURE — 94760 N-INVAS EAR/PLS OXIMETRY 1: CPT

## 2017-04-05 PROCEDURE — 63600175 PHARM REV CODE 636 W HCPCS: Performed by: PHYSICIAN ASSISTANT

## 2017-04-05 PROCEDURE — 25000003 PHARM REV CODE 250: Performed by: PHYSICIAN ASSISTANT

## 2017-04-05 PROCEDURE — C9113 INJ PANTOPRAZOLE SODIUM, VIA: HCPCS | Performed by: SURGERY

## 2017-04-05 PROCEDURE — 25000003 PHARM REV CODE 250: Performed by: SURGERY

## 2017-04-05 PROCEDURE — 76937 US GUIDE VASCULAR ACCESS: CPT

## 2017-04-05 PROCEDURE — 85007 BL SMEAR W/DIFF WBC COUNT: CPT

## 2017-04-05 PROCEDURE — 63600175 PHARM REV CODE 636 W HCPCS: Performed by: SURGERY

## 2017-04-05 PROCEDURE — 84100 ASSAY OF PHOSPHORUS: CPT

## 2017-04-05 PROCEDURE — 97110 THERAPEUTIC EXERCISES: CPT

## 2017-04-05 RX ORDER — SYRING-NEEDL,DISP,INSUL,0.3 ML 29 G X1/2"
300 SYRINGE, EMPTY DISPOSABLE MISCELLANEOUS
Status: COMPLETED | OUTPATIENT
Start: 2017-04-05 | End: 2017-04-05

## 2017-04-05 RX ORDER — MAGNESIUM SULFATE HEPTAHYDRATE 40 MG/ML
2 INJECTION, SOLUTION INTRAVENOUS ONCE
Status: COMPLETED | OUTPATIENT
Start: 2017-04-05 | End: 2017-04-05

## 2017-04-05 RX ORDER — SODIUM CHLORIDE 0.9 % (FLUSH) 0.9 %
10 SYRINGE (ML) INJECTION
Status: DISCONTINUED | OUTPATIENT
Start: 2017-04-05 | End: 2017-04-20 | Stop reason: HOSPADM

## 2017-04-05 RX ORDER — SODIUM CHLORIDE 0.9 % (FLUSH) 0.9 %
10 SYRINGE (ML) INJECTION EVERY 6 HOURS
Status: DISCONTINUED | OUTPATIENT
Start: 2017-04-05 | End: 2017-04-20 | Stop reason: HOSPADM

## 2017-04-05 RX ORDER — POTASSIUM CHLORIDE 7.45 MG/ML
10 INJECTION INTRAVENOUS
Status: COMPLETED | OUTPATIENT
Start: 2017-04-05 | End: 2017-04-05

## 2017-04-05 RX ORDER — PSEUDOEPHEDRINE/ACETAMINOPHEN 30MG-500MG
100 TABLET ORAL
Status: COMPLETED | OUTPATIENT
Start: 2017-04-05 | End: 2017-04-05

## 2017-04-05 RX ADMIN — Medication 10 ML: at 11:04

## 2017-04-05 RX ADMIN — MAGESIUM CITRATE 300 ML: 1.75 LIQUID ORAL at 12:04

## 2017-04-05 RX ADMIN — SOYBEAN OIL 250 ML: 20 INJECTION, SOLUTION INTRAVENOUS at 09:04

## 2017-04-05 RX ADMIN — SODIUM PHOSPHATE, MONOBASIC, MONOHYDRATE 30 MMOL: 276; 142 INJECTION, SOLUTION INTRAVENOUS at 03:04

## 2017-04-05 RX ADMIN — Medication 3 ML: at 09:04

## 2017-04-05 RX ADMIN — Medication 10 ML: at 06:04

## 2017-04-05 RX ADMIN — Medication 100 ML: at 12:04

## 2017-04-05 RX ADMIN — MAGNESIUM SULFATE IN WATER 2 G: 40 INJECTION, SOLUTION INTRAVENOUS at 01:04

## 2017-04-05 RX ADMIN — ENOXAPARIN SODIUM 40 MG: 100 INJECTION SUBCUTANEOUS at 05:04

## 2017-04-05 RX ADMIN — POTASSIUM CHLORIDE 10 MEQ: 10 INJECTION, SOLUTION INTRAVENOUS at 08:04

## 2017-04-05 RX ADMIN — Medication 3 ML: at 01:04

## 2017-04-05 RX ADMIN — Medication: at 01:04

## 2017-04-05 RX ADMIN — PANTOPRAZOLE SODIUM 40 MG: 40 INJECTION, POWDER, FOR SOLUTION INTRAVENOUS at 09:04

## 2017-04-05 RX ADMIN — SODIUM CHLORIDE, SODIUM LACTATE, POTASSIUM CHLORIDE, AND CALCIUM CHLORIDE: 600; 310; 30; 20 INJECTION, SOLUTION INTRAVENOUS at 11:04

## 2017-04-05 RX ADMIN — SODIUM CHLORIDE 500 ML: 0.9 INJECTION, SOLUTION INTRAVENOUS at 12:04

## 2017-04-05 RX ADMIN — POTASSIUM CHLORIDE 10 MEQ: 10 INJECTION, SOLUTION INTRAVENOUS at 10:04

## 2017-04-05 RX ADMIN — Medication 1 CAPSULE: at 09:04

## 2017-04-05 RX ADMIN — ASCORBIC ACID, VITAMIN A PALMITATE, CHOLECALCIFEROL, THIAMINE HYDROCHLORIDE, RIBOFLAVIN-5 PHOSPHATE SODIUM, PYRIDOXINE HYDROCHLORIDE, NIACINAMIDE, DEXPANTHENOL, ALPHA-TOCOPHEROL ACETATE, VITAMIN K1, FOLIC ACID, BIOTIN, CYANOCOBALAMIN: 200; 3300; 200; 6; 3.6; 6; 40; 15; 10; 150; 600; 60; 5 INJECTION, SOLUTION INTRAVENOUS at 09:04

## 2017-04-05 NOTE — PLAN OF CARE
Problem: Physical Therapy Goal  Goal: Physical Therapy Goal  Goals to be met by: 2017     Patient will increase functional independence with mobility by performin. Supine to sit with Stand-by Assistance  2. Sit to supine with Stand-by Assistance  3. Bed to chair transfer with Modified [place] using Rolling Walker  4. Gait x 150 feet with [place] using no assistive device.    Pt progressing towards goals. continue with PT POC.Goals remain appropriate.      Timoteo Gonzalez PTA  2017

## 2017-04-05 NOTE — PROGRESS NOTES
[facility]  General Surgery  Progress Note    Subjective:     Interval History:   Patient seen and examined,  tolerating ice chips with g-tube to gravity - put out 1550mL overnight, denies N/V/F/BM; pain well managed with PCA    Post-Op Info:  Procedure(s) (LRB):  EXPLORATORY-LAPAROTOMY (N/A)  REPAIR  LYSIS-ADHESION (N/A)  PLACEMENT   4 Days Post-Op      Medications:  Continuous Infusions:   hydromorphone in 0.9 % NaCl 6 mg/30 ml      lactated ringers 150 mL/hr at 04/04/17 1500     Scheduled Meds:   enoxaparin  40 mg Subcutaneous Q24H    Lactobacillus rhamnosus GG  1 capsule Oral Daily    pantoprazole  40 mg Intravenous Daily    sodium chloride 0.9%  3 mL Intravenous Q8H     PRN Meds:dextrose 50%, diphenhydrAMINE, glucagon (human recombinant), insulin aspart, naloxone, ondansetron, promethazine (PHENERGAN) IVPB     Objective:     Vital Signs (Most Recent):  Temp: 98.1 °F (36.7 °C) (04/05/17 0346)  Pulse: 80 (04/05/17 0700)  Resp: 16 (04/05/17 0346)  BP: (!) 103/52 (04/05/17 0346)  SpO2: 96 % (04/05/17 0346) Vital Signs (24h Range):  Temp:  [97 °F (36.1 °C)-98.1 °F (36.7 °C)] 98.1 °F (36.7 °C)  Pulse:  [78-91] 80  Resp:  [16-18] 16  SpO2:  [94 %-96 %] 96 %  BP: ()/(51-58) 103/52       Intake/Output Summary (Last 24 hours) at 04/05/17 0813  Last data filed at 04/05/17 0349   Gross per 24 hour   Intake           2132.5 ml   Output             2810 ml   Net           -677.5 ml       Physical Exam   Constitutional: She appears well-developed and well-nourished. No distress.   HENT:   Head: Normocephalic and atraumatic.   Cardiovascular: Normal rate and regular rhythm.    Pulmonary/Chest: Effort normal. No respiratory distress.   Abdominal:   Soft, appropriate TTP, non-distended, incision - staples in place, clean dry and intact  G-tube in place with bilious output       Significant Labs:  BMP:     Recent Labs  Lab 04/05/17  0615         K 3.8      CO2 24   BUN 13    CREATININE 0.6   CALCIUM 7.0*   MG 1.8     CBC:     Recent Labs  Lab 04/05/17  0340   WBC 6.54   RBC 2.66*   HGB 8.0*   HCT 23.7*   *   MCV 89   MCH 30.1   MCHC 33.8     CMP:     Recent Labs  Lab 04/05/17  0615      CALCIUM 7.0*   ALBUMIN 1.6*   PROT 4.4*      K 3.8   CO2 24      BUN 13   CREATININE 0.6   ALKPHOS 32*   ALT 12   AST 23   BILITOT 0.8       Lactic Acid:     Recent Labs  Lab 04/04/17  0357   LACTATE 1.4     LFTs:     Recent Labs  Lab 04/05/17  0615   ALT 12   AST 23   ALKPHOS 32*   BILITOT 0.8   PROT 4.4*   ALBUMIN 1.6*     All pertinent labs from the last 24 hours have been reviewed.    Assessment/Plan:   73 yo female s/p ex lap with TIFFANY and G-tube placement    S/p ex lap  -continue NPO, IVF  -starting PICC/TPN today  -continue PCA  -nausea meds PRN    DM  -home meds, insulin    Hypokalemia, hypophosphatemia, hypomagnesemia  -replace    Protein calorie malnutrition  -Prealbumin = 4  -starting TPN 4/5/17    Prophylaxis  DVT/GI    Sanjuana Redding PA-C   [id number]  General Surgery  [facility]

## 2017-04-05 NOTE — PLAN OF CARE
Problem: Patient Care Overview  Goal: Plan of Care Review  Outcome: Ongoing (interventions implemented as appropriate)  Plan of care discussed with pt. Pt verbalizes understanding. Pt remained NPO except for ice chips and gum. Pain managed with dilaudid PCA. Pt on telemetry, normal sinus rhythm. KOURTNEY drain intact, serosang drainage. G tube to gravity, clamped for meds, green drainage noted and measured. Pt ambulated in espitia once with PT. Pt up in chair for 4 hours this shift. Pt had small BM after brown bomb. Pt positions independently. Vital signs stable on RA. Pt remains free of falls and injury. Will continue to monitor.

## 2017-04-05 NOTE — PT/OT/SLP PROGRESS
Physical Therapy  Treatment    Anayeli Judd   MRN: [id number]   Admitting Diagnosis: SBO (small bowel obstruction)    PT Received On: 04/05/17  PT Start Time: 1131     PT Stop Time: 1155    PT Total Time (min): 24 min       Billable Minutes:  Gait [id number] and Therapeutic Exercise 10    Treatment Type: Treatment  PT/PTA: PTA     PTA Visit Number: 1       General Precautions: Standard, fall  Orthopedic Precautions: N/A   Braces:           Subjective:  Communicated with RN prior to session.  I walked earlier    Pain Rating:  (did not rate)        Location: abdomen  Pain Addressed: Pre-medicate for activity, Distraction       Objective:   Patient found with: peripheral IV, KOURTNEY drain    Functional Mobility:  Bed Mobility:        Transfers:  Sit <> Stand Assistance: Stand By Assistance  Sit <> Stand Assistive Device: No Assistive Device    Gait:   Gait Distance: 200 ft vcs for safety and upright posture  Assistance 1: Stand by Assistance  Gait Assistive Device: Rolling walker  Gait Pattern: swing-through gait  Gait Deviation(s): decreased anant, forward lean    Therapeutic Activities and Exercises:   Discussed pt progress and d/c  Patient performed therex X 15 reps seated in bedside chair B LE AROM AP, LAQ, Hip Flexion, Hip Abd/Add   White board updated    AM-PAC 6 CLICK MOBILITY  How much help from another person does this patient currently need?   1 = Unable, Total/Dependent Assistance  2 = A lot, Maximum/Moderate Assistance  3 = A little, Minimum/Contact Guard/Supervision  4 = None, Modified [place]/Independent    Turning over in bed (including adjusting bedclothes, sheets and blankets)?: 3  Sitting down on and standing up from a chair with arms (e.g., wheelchair, bedside commode, etc.): 4  Moving from lying on back to sitting on the side of the bed?: 3  Moving to and from a bed to a chair (including a wheelchair)?: 3  Need to walk in hospital room?: 3  Climbing 3-5 steps with a railing?: 3  Total Score:  19    AM-PAC Raw Score CMS G-Code Modifier Level of Impairment Assistance   6 % Total / Unable   7 - 9 CM 80 - 100% Maximal Assist   10 - 14 CL 60 - 80% Moderate Assist   15 - 19 CK 40 - 60% Moderate Assist   20 - 22 CJ 20 - 40% Minimal Assist   23 CI 1-20% SBA / CGA   24 CH 0% Independent/ Mod I     Patient left up in chair with all lines intact, call button in reach and nsg notified.    Assessment:  Anayeli Judd is a 72 y.o. female with a medical diagnosis of SBO (small bowel obstruction) and presents with continued deficits as listed below.  Pt Progressing with PT Intervention. Pt Progressing with increase gait distance. Pt would continue to benefit from skilled PT to address overall functional mobility and goals. Goals remain appropriate.    Rehab identified problem list/impairments: Rehab identified problem list/impairments: impaired endurance, pain, impaired skin, impaired functional mobilty    Rehab potential is good.    Activity tolerance: Good    Discharge recommendations: Discharge Facility/Level Of Care Needs: home with home health     Barriers to discharge: Barriers to Discharge: None    Equipment recommendations: Equipment Needed After Discharge: none     GOALS:   Physical Therapy Goals        Problem: Physical Therapy Goal    Goal Priority Disciplines Outcome Goal Variances Interventions   Physical Therapy Goal     PT/OT, PT Ongoing (interventions implemented as appropriate)     Description:  Goals to be met by: 2017     Patient will increase functional independence with mobility by performin. Supine to sit with Stand-by Assistance  2. Sit to supine with Stand-by Assistance  3. Bed to chair transfer with Modified [place] using Rolling Walker  4. Gait  x 150 feet with [place] using no assistive device.                 PLAN:    Patient to be seen 4 x/week  to address the above listed problems via gait training, therapeutic activities, therapeutic exercises  Plan of Care  expires: 05/03/17  Plan of Care reviewed with: patient, family         Timoteo Gonzalez, DWAIN  04/05/2017

## 2017-04-05 NOTE — PLAN OF CARE
Problem: Patient Care Overview  Goal: Plan of Care Review  Outcome: Ongoing (interventions implemented as appropriate)  POC reviewed with pt. Pt AAOx4, VSS, afebrile, NSR on tele. SpO2 > 92% on rm air. ML ROSANNE with staples, CDI. G tube drainage to gravity, flushed with 30cc water q2h and PRN. Pt NPO, denies nausea/vomiting. KOURTNEY drain to right abdomen CDI, output monitored. Pt remains NPO except ice. Pt complains of mild pain, Pain pump in use. Pt urinates adequately per toilet. Up with one person assist, sitter in room.  Acuchecks done q6h, no coverage needed. Pt remains free from falls and injuries, will continue to monitor.

## 2017-04-05 NOTE — CONSULTS
Double lumen PICC placed to left basilic vein.  37cm in length, 30cm arm circumference, 0cm exposed.  Lot# [id number].

## 2017-04-05 NOTE — PROCEDURES
"Anayeli Judd is a 72 y.o. female patient.    Temp: 96.5 °F (35.8 °C) (04/05/17 1558)  Pulse: 79 (04/05/17 1558)  Resp: 17 (04/05/17 1558)  BP: (!) 109/56 (04/05/17 1558)  SpO2: 95 % (04/05/17 1558)  Weight: 69.9 kg (154 lb) (03/30/17 1819)  Height: 5' 4" (162.6 cm) (03/30/17 1819)    PICC  Date/Time: 4/5/2017 4:46 PM  Performed by: CHIDI BRAVO  Consent Done: Yes  Time out: Immediately prior to procedure a time out was called to verify the correct patient, procedure, equipment, support staff and site/side marked as required  Indications: med administration and vascular access  Anesthesia: local infiltration  Local anesthetic: lidocaine 1% without epinephrine  Anesthetic Total (mL): 3  Preparation: skin prepped with ChloraPrep  Skin prep agent dried: skin prep agent completely dried prior to procedure  Sterile barriers: all five maximum sterile barriers used - cap, mask, sterile gown, sterile gloves, and large sterile sheet  Hand hygiene: hand hygiene performed prior to central venous catheter insertion  Location details: left basilic  Catheter type: double lumen  Catheter size: 5 Fr  Catheter Length: 37cm    Ultrasound guidance: yes  Vessel Caliber: medium and patent, compressibility normal  Vascular Doppler: not done  Needle advanced into vessel with real time Ultrasound guidance.  Guidewire confirmed in vessel.  Image recorded and saved.  Sterile sheath used.  no esophageal manometryNumber of attempts: 1  Post-procedure: blood return through all ports, chlorhexidine patch and sterile dressing applied  Technical procedures used: 3CG  Specimens: No  Implants: No  Assessment: placement verified by x-ray  Complications: none        Shannan Koehler  4/5/2017  "

## 2017-04-05 NOTE — PHYSICIAN QUERY
PT Name: Anayeli Judd  MR #: [id number]    Physician Query Form - Nutrition Clarification     CDS/: Clara SWEENEYN, RN, CCDS               Contact information: [email or web address]     This form is a permanent document in the medical record.     Query Date: April 5, 2017    By submitting this query, we are merely seeking further clarification of documentation.. Please utilize your independent clinical judgment when addressing the question(s) below.    The Medical record contains the following:   Indicators  Supporting Clinical Findings Location in Medical Record    % of Estimated Energy Intake over a time frame from p.o., TF, or TPN      Weight Status over a time frame      Subcutaneous Fat and/or Muscle Loss      Fluid Accumulation or Edema      Reduced  Strength       x Wt / BMI / Usual Body Weight  Weight 69.9 kg   BMI 26.5  Anthropometrics flow sheet    Delayed Wound Healing / Failure to Thrive       x Acute or Chronic Illness  73 yo female s/p ex lap with TIFFANY and G-tube placement       INDICATIONS FOR PROCEDURE: This 72-year-old patient was admitted with a bowel   obstruction about 36 hours ago and failed to improve clinically.    PROCEDURES PERFORMED: Exploratory laparotomy, extensive complex lysis of   adhesions, small-bowel repair x14 and insertion of gastrostomy tube.  General Surgery Progress 4/5/17       Op Note 4/1/17    Medication       x Treatment  Protein calorie malnutrition  -Prealbumin = 4  -starting TPN 4/5/17    General Surgery Progress 4/5/17     x Other  tolerating ice chips with g-tube to gravity - put out 1550mL overnight  General Surgery Progress 4/5/17     AND / [facility] Clinical Characteristics (October 2011)  A minimum of two characteristics is recommended for diagnosing either moderate or severe malnutrition   Mild Malnutrition Moderate Malnutrition Severe Malnutrition   Energy Intake from p.o., TF or TPN. < 75% intake of estimated energy needs for less than 7 days < 75% intake  of estimated energy needs for greater than 7 days < 50% intake of estimated energy needs for > 5 days   Weight Loss 1-2% in 1 month  5% in 3 months  7.5% in 6 months  10% in 1 year 1-2 % in 1 week  5% in 1 month  7.5% in 3 months  10% in 6 months  20% in 1 year > 2% in 1 week  > 5% in 1 month  > 7.5% in 3 months  > 10% in 6 months  > 20% in 1 year   Physical Findings     None *Mild subcutaneous fat and/or muscle loss  *Mild fluid accumulation  *Stage II decubitus  *Surgical wound or non-healing wound *Mod/severe subcutaneous fat and/or muscle loss  *Mod/severe fluid accumulation  *Stage III or IV decubitus  *Non-healing surgical wound     Provider, please specify diagnosis or diagnoses associated with above clinical findings.    [ ] Mild Protein-Calorie Malnutrition  [X ] Moderate Protein-Calorie Malnutrition  [ ] Severe Protein-Calorie Malnutrition    [ ] Other Nutritional Diagnosis (please specify): ____________________________________  [ ] Other: ________________________________  [ ] Clinically Undetermined    Please document in your progress notes daily for the duration of treatment until resolved and include in your discharge summary.

## 2017-04-06 LAB
ALBUMIN SERPL BCP-MCNC: 1.7 G/DL
ALP SERPL-CCNC: 47 U/L
ALT SERPL W/O P-5'-P-CCNC: 12 U/L
ANION GAP SERPL CALC-SCNC: 6 MMOL/L
ANISOCYTOSIS BLD QL SMEAR: SLIGHT
AST SERPL-CCNC: 21 U/L
BASOPHILS # BLD AUTO: ABNORMAL K/UL
BASOPHILS NFR BLD: 1 %
BILIRUB SERPL-MCNC: 0.8 MG/DL
BUN SERPL-MCNC: 9 MG/DL
CALCIUM SERPL-MCNC: 7.3 MG/DL
CHLORIDE SERPL-SCNC: 103 MMOL/L
CO2 SERPL-SCNC: 26 MMOL/L
CREAT SERPL-MCNC: 0.6 MG/DL
DIFFERENTIAL METHOD: ABNORMAL
EOSINOPHIL # BLD AUTO: ABNORMAL K/UL
EOSINOPHIL NFR BLD: 3 %
ERYTHROCYTE [DISTWIDTH] IN BLOOD BY AUTOMATED COUNT: 13.6 %
EST. GFR  (AFRICAN AMERICAN): >60 ML/MIN/1.73 M^2
EST. GFR  (NON AFRICAN AMERICAN): >60 ML/MIN/1.73 M^2
GLUCOSE SERPL-MCNC: 202 MG/DL
HCT VFR BLD AUTO: 23.6 %
HGB BLD-MCNC: 8.2 G/DL
HYPOCHROMIA BLD QL SMEAR: ABNORMAL
LYMPHOCYTES # BLD AUTO: ABNORMAL K/UL
LYMPHOCYTES NFR BLD: 15 %
MAGNESIUM SERPL-MCNC: 1.7 MG/DL
MCH RBC QN AUTO: 30.8 PG
MCHC RBC AUTO-ENTMCNC: 34.7 %
MCV RBC AUTO: 89 FL
METAMYELOCYTES NFR BLD MANUAL: 0 %
MONOCYTES # BLD AUTO: ABNORMAL K/UL
MONOCYTES NFR BLD: 6 %
MYELOCYTES NFR BLD MANUAL: 1 %
NEUTROPHILS NFR BLD: 74 %
OVALOCYTES BLD QL SMEAR: ABNORMAL
PHOSPHATE SERPL-MCNC: 2.1 MG/DL
PLATELET # BLD AUTO: 115 K/UL
PLATELET BLD QL SMEAR: ABNORMAL
PMV BLD AUTO: 11.1 FL
POCT GLUCOSE: 173 MG/DL (ref 70–110)
POCT GLUCOSE: 194 MG/DL (ref 70–110)
POCT GLUCOSE: 226 MG/DL (ref 70–110)
POCT GLUCOSE: 257 MG/DL (ref 70–110)
POIKILOCYTOSIS BLD QL SMEAR: SLIGHT
POLYCHROMASIA BLD QL SMEAR: ABNORMAL
POTASSIUM SERPL-SCNC: 3.3 MMOL/L
PREALB SERPL-MCNC: 4 MG/DL
PROT SERPL-MCNC: 4.6 G/DL
RBC # BLD AUTO: 2.66 M/UL
SODIUM SERPL-SCNC: 135 MMOL/L
WBC # BLD AUTO: 8.7 K/UL

## 2017-04-06 PROCEDURE — 83735 ASSAY OF MAGNESIUM: CPT

## 2017-04-06 PROCEDURE — 63600175 PHARM REV CODE 636 W HCPCS: Performed by: SURGERY

## 2017-04-06 PROCEDURE — 97802 MEDICAL NUTRITION INDIV IN: CPT

## 2017-04-06 PROCEDURE — 25000003 PHARM REV CODE 250: Performed by: PHYSICIAN ASSISTANT

## 2017-04-06 PROCEDURE — 84134 ASSAY OF PREALBUMIN: CPT

## 2017-04-06 PROCEDURE — 85007 BL SMEAR W/DIFF WBC COUNT: CPT

## 2017-04-06 PROCEDURE — 63600175 PHARM REV CODE 636 W HCPCS: Performed by: PHYSICIAN ASSISTANT

## 2017-04-06 PROCEDURE — 97535 SELF CARE MNGMENT TRAINING: CPT

## 2017-04-06 PROCEDURE — 80053 COMPREHEN METABOLIC PANEL: CPT

## 2017-04-06 PROCEDURE — 25000003 PHARM REV CODE 250: Performed by: SURGERY

## 2017-04-06 PROCEDURE — 84100 ASSAY OF PHOSPHORUS: CPT

## 2017-04-06 PROCEDURE — 20600001 HC STEP DOWN PRIVATE ROOM

## 2017-04-06 PROCEDURE — 85027 COMPLETE CBC AUTOMATED: CPT

## 2017-04-06 PROCEDURE — 94664 DEMO&/EVAL PT USE INHALER: CPT

## 2017-04-06 PROCEDURE — C9113 INJ PANTOPRAZOLE SODIUM, VIA: HCPCS | Performed by: SURGERY

## 2017-04-06 RX ORDER — POTASSIUM CHLORIDE 7.45 MG/ML
10 INJECTION INTRAVENOUS
Status: COMPLETED | OUTPATIENT
Start: 2017-04-06 | End: 2017-04-06

## 2017-04-06 RX ORDER — MAGNESIUM SULFATE HEPTAHYDRATE 40 MG/ML
2 INJECTION, SOLUTION INTRAVENOUS ONCE
Status: COMPLETED | OUTPATIENT
Start: 2017-04-06 | End: 2017-04-06

## 2017-04-06 RX ORDER — DEXTROSE MONOHYDRATE, SODIUM CHLORIDE, AND POTASSIUM CHLORIDE 50; 1.49; 4.5 G/1000ML; G/1000ML; G/1000ML
INJECTION, SOLUTION INTRAVENOUS CONTINUOUS
Status: DISCONTINUED | OUTPATIENT
Start: 2017-04-06 | End: 2017-04-12

## 2017-04-06 RX ADMIN — MAGNESIUM SULFATE IN WATER 2 G: 40 INJECTION, SOLUTION INTRAVENOUS at 02:04

## 2017-04-06 RX ADMIN — Medication 10 ML: at 06:04

## 2017-04-06 RX ADMIN — INSULIN ASPART 3 UNITS: 100 INJECTION, SOLUTION INTRAVENOUS; SUBCUTANEOUS at 06:04

## 2017-04-06 RX ADMIN — INSULIN ASPART 1 UNITS: 100 INJECTION, SOLUTION INTRAVENOUS; SUBCUTANEOUS at 11:04

## 2017-04-06 RX ADMIN — ASCORBIC ACID, VITAMIN A PALMITATE, CHOLECALCIFEROL, THIAMINE HYDROCHLORIDE, RIBOFLAVIN-5 PHOSPHATE SODIUM, PYRIDOXINE HYDROCHLORIDE, NIACINAMIDE, DEXPANTHENOL, ALPHA-TOCOPHEROL ACETATE, VITAMIN K1, FOLIC ACID, BIOTIN, CYANOCOBALAMIN: 200; 3300; 200; 6; 3.6; 6; 40; 15; 10; 150; 600; 60; 5 INJECTION, SOLUTION INTRAVENOUS at 10:04

## 2017-04-06 RX ADMIN — POTASSIUM CHLORIDE 10 MEQ: 10 INJECTION, SOLUTION INTRAVENOUS at 12:04

## 2017-04-06 RX ADMIN — INSULIN ASPART 2 UNITS: 100 INJECTION, SOLUTION INTRAVENOUS; SUBCUTANEOUS at 05:04

## 2017-04-06 RX ADMIN — POTASSIUM CHLORIDE 10 MEQ: 10 INJECTION, SOLUTION INTRAVENOUS at 11:04

## 2017-04-06 RX ADMIN — Medication 3 ML: at 06:04

## 2017-04-06 RX ADMIN — DEXTROSE MONOHYDRATE, SODIUM CHLORIDE, AND POTASSIUM CHLORIDE: 50; 4.5; 1.49 INJECTION, SOLUTION INTRAVENOUS at 09:04

## 2017-04-06 RX ADMIN — POTASSIUM CHLORIDE 10 MEQ: 10 INJECTION, SOLUTION INTRAVENOUS at 01:04

## 2017-04-06 RX ADMIN — SODIUM CHLORIDE, SODIUM LACTATE, POTASSIUM CHLORIDE, AND CALCIUM CHLORIDE: 600; 310; 30; 20 INJECTION, SOLUTION INTRAVENOUS at 06:04

## 2017-04-06 RX ADMIN — PANTOPRAZOLE SODIUM 40 MG: 40 INJECTION, POWDER, FOR SOLUTION INTRAVENOUS at 09:04

## 2017-04-06 RX ADMIN — Medication: at 12:04

## 2017-04-06 RX ADMIN — POTASSIUM CHLORIDE 10 MEQ: 10 INJECTION, SOLUTION INTRAVENOUS at 09:04

## 2017-04-06 RX ADMIN — Medication 10 ML: at 11:04

## 2017-04-06 RX ADMIN — Medication 3 ML: at 02:04

## 2017-04-06 RX ADMIN — ENOXAPARIN SODIUM 40 MG: 100 INJECTION SUBCUTANEOUS at 05:04

## 2017-04-06 RX ADMIN — I.V. FAT EMULSION 250 ML: 20 EMULSION INTRAVENOUS at 10:04

## 2017-04-06 RX ADMIN — Medication 3 ML: at 10:04

## 2017-04-06 RX ADMIN — POTASSIUM PHOSPHATE, MONOBASIC AND POTASSIUM PHOSPHATE, DIBASIC 30 MMOL: 224; 236 INJECTION, SOLUTION, CONCENTRATE INTRAVENOUS at 10:04

## 2017-04-06 RX ADMIN — Medication 1 CAPSULE: at 09:04

## 2017-04-06 NOTE — PLAN OF CARE
Problem: Occupational Therapy Goal  Goal: Occupational Therapy Goal  Goals to be met by: 5/1/2017    Patient will increase functional independence with ADLs by performing:    UE Dressing with Stand-by Assistance.  LE Dressing with Moderate Assistance.  Grooming while standing with Set-up Assistance.  Toileting from toilet with Minimal Assistance for hygiene and clothing management. MET 4/6/17  Bathing from seated with Minimal Assistance.  Toilet transfer to toilet with Supervision.   Outcome: Ongoing (interventions implemented as appropriate)  Pt met one goal this date. Continue OT POC.     Giana Ballesteros, OTR/L  4/6/2017

## 2017-04-06 NOTE — PROGRESS NOTES
[facility]  General Surgery  Progress Note    Subjective:     Interval History:   Patient seen and examined,  tolerating ice chips with g-tube to gravity - put out 450mL overnight, denies N/V/F/BM; pain well managed with PCA; tolerating TPN, BMx1    Post-Op Info:  Procedure(s) (LRB):  EXPLORATORY-LAPAROTOMY (N/A)  REPAIR  LYSIS-ADHESION (N/A)  PLACEMENT   5 Days Post-Op      Medications:  Continuous Infusions:   Amino acid 5% - dextrose 15% (CLINIMIX-E) solution with additives (1L  provides 510 kcal/L dextrose, with 50 gm AA, 150 gm CHO, Na 35, K 30, Mg 5, Ca 4.5, Acetate 80, Cl 39, Phos 15) 60 mL/hr at 04/05/17 2131    hydromorphone in 0.9 % NaCl 6 mg/30 ml      lactated ringers 150 mL/hr at 04/06/17 0643     Scheduled Meds:   enoxaparin  40 mg Subcutaneous Q24H    fat emulsion 20%  250 mL Intravenous Daily    Lactobacillus rhamnosus GG  1 capsule Oral Daily    pantoprazole  40 mg Intravenous Daily    sodium chloride 0.9%  10 mL Intravenous Q6H    sodium chloride 0.9%  3 mL Intravenous Q8H     PRN Meds:dextrose 50%, diphenhydrAMINE, glucagon (human recombinant), insulin aspart, naloxone, ondansetron, promethazine (PHENERGAN) IVPB, Flushing PICC Protocol **AND** sodium chloride 0.9% **AND** sodium chloride 0.9%     Objective:     Vital Signs (Most Recent):  Temp: 98.9 °F (37.2 °C) (04/06/17 0423)  Pulse: 79 (04/06/17 0654)  Resp: 18 (04/06/17 0423)  BP: (!) 140/65 (04/06/17 0423)  SpO2: 95 % (04/06/17 0423) Vital Signs (24h Range):  Temp:  [96.5 °F (35.8 °C)-98.9 °F (37.2 °C)] 98.9 °F (37.2 °C)  Pulse:  [79-91] 79  Resp:  [17-18] 18  SpO2:  [93 %-97 %] 95 %  BP: (108-140)/(53-65) 140/65       Intake/Output Summary (Last 24 hours) at 04/06/17 0752  Last data filed at 04/06/17 0426   Gross per 24 hour   Intake          5153.79 ml   Output             1325 ml   Net          3828.79 ml       Physical Exam   Constitutional: She appears well-developed and well-nourished. No distress.   HENT:    Head: Normocephalic and atraumatic.   Cardiovascular: Normal rate and regular rhythm.    Pulmonary/Chest: Effort normal. No respiratory distress.   Abdominal:   Soft, appropriate TTP, non-distended, incision - staples in place, clean dry and intact  G-tube in place with bilious output       Significant Labs:  BMP:     Recent Labs  Lab 04/06/17  0459   *   *   K 3.3*      CO2 26   BUN 9   CREATININE 0.6   CALCIUM 7.3*   MG 1.7     CBC:     Recent Labs  Lab 04/06/17  0459   WBC 8.70   RBC 2.66*   HGB 8.2*   HCT 23.6*   *   MCV 89   MCH 30.8   MCHC 34.7     CMP:     Recent Labs  Lab 04/06/17  0459   *   CALCIUM 7.3*   ALBUMIN 1.7*   PROT 4.6*   *   K 3.3*   CO2 26      BUN 9   CREATININE 0.6   ALKPHOS 47*   ALT 12   AST 21   BILITOT 0.8     LFTs:     Recent Labs  Lab 04/06/17  0459   ALT 12   AST 21   ALKPHOS 47*   BILITOT 0.8   PROT 4.6*   ALBUMIN 1.7*     All pertinent labs from the last 24 hours have been reviewed.    Assessment/Plan:   73 yo female s/p ex lap with TIFFANY and G-tube placement    S/p ex lap  -ok for ice chips  -mIVF  -continue TPN  -continue PCA  -nausea meds PRN  -will clamp G for 6 hours at a time; unclamp for nausea or vomting    DM  -home meds, insulin    Hypokalemia, hypophosphatemia, hypomagnesemia  -replace    Protein calorie malnutrition  -Prealbumin = 4  -starting TPN 4/6/17    Prophylaxis  DVT/GI    Sanjuana Redding PA-C   [id number]  General Surgery  [facility]

## 2017-04-06 NOTE — PLAN OF CARE
Problem: Patient Care Overview  Goal: Plan of Care Review  Outcome: Ongoing (interventions implemented as appropriate)  Recommendations  1. Continue current TPN - providing > 85% EEN and EPN.   2. As medically appropriate, advance PO diet as tolerated.      RD to monitor. Full assessment completed. See RD Consult Note 4/6/17.

## 2017-04-06 NOTE — PLAN OF CARE
Problem: Patient Care Overview  Goal: Plan of Care Review  Outcome: Ongoing (interventions implemented as appropriate)  POC reviewed with pt. Pt AAOx4, VSS, afebrile, NSR on tele. SpO2 > 92% on rm air. ML ROSANNE with staples, CDI. G tube drainage to gravity, flushed with 30cc water q2h and PRN. Pt NPO, denies nausea/vomiting. KOURTNEY drain to right abdomen CDI, output monitored. Pt remains NPO except ice. Pt complains of mild pain, Pain pump in use. TPN started. Pt urinates adequately per toilet. Up with one person assist. Acuchecks done q6h, no coverage needed. Pt remains free from falls and injuries, will continue to monitor.

## 2017-04-06 NOTE — PT/OT/SLP PROGRESS
Occupational Therapy  Treatment    Anayeli Judd   MRN: [id number]   Admitting Diagnosis: SBO (small bowel obstruction)    OT Date of Treatment: 04/06/17   OT Start Time: 1444  OT Stop Time: 1507  OT Total Time (min): 23 min    Billable Minutes:  Self Care/Home Management 23    General Precautions: Standard, fall, NPO  Orthopedic Precautions: N/A  Braces: N/A         Subjective:  Communicated with RN prior to session.  Pt agreeable to participate with therapy.    Pain Rating:  (Pt did not rate)     Location - Orientation: generalized  Location: abdomen  Pain Addressed: Distraction, Reposition  Pain Rating Post-Intervention:  (Pt did not rate)    Objective:  Patient found with: peripheral IV, KOURTNEY drain (G tube)     Functional Mobility:  Bed Mobility:  Rolling/Turning Right: Stand by assistance  Supine to Sit: Stand by Assistance, WIth side rail  Sit to Supine:  (Pt left sitting up in BS chair)    Transfers:   Sit <> Stand Assistance: Stand By Assistance x1 trial from EOB; cues for proper hand placement  Sit <> Stand Assistive Device: Rolling Walker    Toilet Transfer Technique: Stand Pivot  Toilet Transfer Assistance: Contact Guard Assistance; cues for proper transfer technique  Toilet Transfer Assistive Device: Rolling Walker    Functional Ambulation: SBA using RW from EOB>bathroom>BS chair; no LOB or SOB noted    Activities of Daily Living:  LE Dressing Level of Assistance: Total assistance to mary B socks while seated in BS chair; pt attempted to perform figure four to increase ease with dressing task however unable to cross BLEs 2* edema  Grooming Position: Standing at sink  Grooming Level of Assistance: Stand by assistance to wash face/hands, brush teeth, and comb hair  Toileting Where Assessed: Toilet  Toileting Level of Assistance: Contact guard for hygiene and clothing management    Balance:   Static Sit: FAIR+: Able to take MINIMAL challenges from all directions  Dynamic Sit: FAIR+: Maintains balance through  MINIMAL excursions of active trunk motion  Static Stand: FAIR+: Takes MINIMAL challenges from all directions  Dynamic stand: FAIR: Needs CONTACT GUARD during gait    Therapeutic Activities and Exercises:  Pt performed ~6-8 min dynamic standing at sink while completing grooming with CGA for balance. Pt educated to maintain RW in front of her for increased stability/safety demonstrating good understanding. No LOB or SOB noted.    Pt instructed to notify RN/PCT when wanting to get back to bed for safety. Pt verbalized understanding.    AM-PAC 6 CLICK ADL   How much help from another person does this patient currently need?   1 = Unable, Total/Dependent Assistance  2 = A lot, Maximum/Moderate Assistance  3 = A little, Minimum/Contact Guard/Supervision  4 = None, Modified [place]/Independent    Putting on and taking off regular lower body clothing? : 1  Bathing (including washing, rinsing, drying)?: 2  Toileting, which includes using toilet, bedpan, or urinal? : 2  Putting on and taking off regular upper body clothing?: 3  Taking care of personal grooming such as brushing teeth?: 3  Eating meals?: 1 (NPO)  Total Score: 12     AM-PAC Raw Score CMS G-Code Modifier Level of Impairment Assistance   6 % Total / Unable   7 - 9 CM 80 - 100% Maximal Assist   10 - 14 CL 60 - 80% Moderate Assist   15 - 19 CK 40 - 60% Moderate Assist   20 - 22 CJ 20 - 40% Minimal Assist   23 CI 1-20% SBA / CGA   24 CH 0% Independent/ Mod I     Patient left up in chair with all lines intact and call button in reach    ASSESSMENT:  Anayeli Judd is a 72 y.o. female with a medical diagnosis of SBO (small bowel obstruction) s/p ex-lap. She presents with good participation and motivation during session. Pt progressed this date by performing bed mobility with SBA, functional transfers SBA using RW, grooming at the sink with SBA, and toileting/toilet transfer with CGA. Pt continues to benefit from skilled OT to improve deficits noted  below to increase (I) and safety with ADL performance.    Rehab identified problem list/impairments: Rehab identified problem list/impairments: weakness, impaired endurance, impaired self care skills, pain, impaired functional mobilty, decreased safety awareness    Rehab potential is good.    Activity tolerance: Good    Discharge recommendations: Discharge Facility/Level Of Care Needs: home health OT     Barriers to discharge: Barriers to Discharge: None    Equipment recommendations: none     GOALS:   Occupational Therapy Goals        Problem: Occupational Therapy Goal    Goal Priority Disciplines Outcome Interventions   Occupational Therapy Goal     OT, PT/OT Ongoing (interventions implemented as appropriate)    Description:  Goals to be met by: 5/1/2017    Patient will increase functional independence with ADLs by performing:    UE Dressing with Stand-by Assistance.  LE Dressing with Moderate Assistance.  Grooming while standing with Set-up Assistance.  Toileting from toilet with Minimal Assistance for hygiene and clothing management. MET 4/6/17  Bathing from  seated with Minimal Assistance.  Toilet transfer to toilet with Supervision.                 Plan:  Patient to be seen 4 x/week to address the above listed problems via self-care/home management, therapeutic activities, therapeutic exercises  Plan of Care expires: 05/02/17  Plan of Care reviewed with: patient         VINAY Beltre/KASEY  04/06/2017

## 2017-04-06 NOTE — PLAN OF CARE
Problem: Patient Care Overview  Goal: Plan of Care Review  Outcome: Ongoing (interventions implemented as appropriate)  Plan of care discussed with pt. Pt verbalizes understanding. Pt remained NPO except for ice chips and gum. Pain managed with dilaudid PCA. Pt on telemetry, normal sinus rhythm. KOURTNEY drain intact, serosang drainage. G clamped for about 4 hours. G tube opened for abdominal fullness and nausea. Pt ambulated in espitia once with RN. Pt up to bathroom with 1 person assist. Pt up in chair for 4 hours this shift. Pt positions independently. Vital signs stable on RA. Pt remains free of falls and injury. Will continue to monitor.

## 2017-04-06 NOTE — PT/OT/SLP PROGRESS
"Physical Therapy      Anayeli Judd  MRN: [id number]    Patient not seen today secondary to pt reports "I just got done walking,I don't feel like going right now"  . Will follow-up next scheduled treatment per PT POC.    Timoteo Gonzalez, PTA   4/6/2017      "

## 2017-04-06 NOTE — CONSULTS
[facility]  Adult Nutrition  Consult Note    SUMMARY     Recommendations  Recommendation/Intervention:   1. Continue current TPN - providing > 85% EEN and EPN.   2. As medically appropriate, advance PO diet as tolerated.   RD to monitor.    Goals: Patient to receive > 85% EEN and EPN  Nutrition Goal Status: new  Communication of RD Recs: discussed on rounds    Reason for Assessment  Reason for Assessment: physician consult  Diagnosis:  (SBO)  Relevent Medical History: colon cancer s/p partial colectomy, SBO s/p SBR, DM, GERD, HLD   Interdisciplinary Rounds: attended   General Information Comments: POD#5 s/p ex lap, TIFFANY, and G-tube placement. Patient NPO on TPN. Nutrition D/C Planning: unable to determine at this time.    Nutrition Prescription Ordered  Current Diet Order: NPO   Current Nutrition Support Formula Ordered: Clinimax E 5/15 (+ lipids)  Current Nutrition Support Rate Ordered: 60 (ml)  Current Nutrition Support Frequency Ordered: mL/hr      Evaluation of Received Nutrients/Fluid Intake  Parenteral Calories (kcal): 1522  Parenteral Protein (gm): 72  Parenteral Fluid (mL): 1690   Energy Calories Required: meeting needs  % Kcal Needs: 102  Protein Required: meeting needs  % Protein Needs: 86   IV Fluid (mL): 2400   Fluid Required: exceed needs  Comments: GIR = 2.1 mg/kg/min  Tolerance: tolerating     Nutrition Risk Screen   Nutrition Risk Screen: reduced oral intake over the last month    Nutrition/Diet History  Patient Reported Diet/Restrictions/Preferences: general  Typical Food/Fluid Intake: Patient unable to tolerate PO intake for ~ 2 days PTA per MD note.   Food Preferences: No cultural or [employer]al needs identified at this time.   Factors Affecting Nutritional Intake: NPO, abdominal pain, nausea/vomiting     Labs/Tests/Procedures/Meds   Pertinent Labs Reviewed: reviewed  Pertinent Labs Comments: Na 135, Glu 202, POCT Glu , HgbA1c 6.6, Ca 7.3, Phos 2.1, Alb 1.7, Pab 4  Pertinent  Medications Reviewed: reviewed  Pertinent Medications Comments: lactobacillus rhamnosus, pantoprazole, IVF    Physical Findings  Overall Physical Appearance:  (nourished)  Tubes: gastrostomy tube  Oral/Mouth Cavity: WDL  Skin:  (incision and drain)    Anthropometrics   Height (inches): 64.02 in  Weight Method: Stated  Weight (kg): 69.9 kg   Ideal Body Weight (IBW), Female: 120.1 lb   % Ideal Body Weight, Female (lb): 128.31 lb  BMI (kg/m2): 26.44  BMI Grade: 25 - 29.9 - overweight     Estimated/Assessed Needs  Weight Used For Calorie Calculations: 69.9 kg (154 lb 1.6 oz)   Height (cm): 162.6 cm   Energy Need Method: [place]-St Jeor (1486 kcal/day (1.25)  RMR ([place]-St. Jeor Equation): 1189.47   Weight Used For Protein Calculations: 69.9 kg (154 lb 1.6 oz)  Protein Requirements: 84-98 g/day (1.2-1.4 g/kg)  Fluid Need Method: RDA Method (1 mL/kcal or per MD)     Monitor and Evaluation  Food and Nutrient Intake: energy intake, parenteral nutrition intake  Food and Nutrient Adminstration: diet order, enteral and parenteral nutrition administration   Physical Activity and Function: nutrition-related ADLs and IADLs  Anthropometric Measurements: weight, weight change  Biochemical Data, Medical Tests and Procedures: electrolyte and renal panel, gastrointestinal profile, glucose/endocrine profile, lipid profile  Nutrition-Focused Physical Findings: overall appearance    Nutrition Risk  Level of Risk:  (1x/week)    Nutrition Follow-Up  RD Follow-up?: Yes    Nutrition Diagnosis  Problem: Altered GI function  Etiology: SBO  Signs/Symptoms: NPO and need for TPN  Nutrition Diagnosis Status: New

## 2017-04-07 LAB
ALBUMIN SERPL BCP-MCNC: 1.6 G/DL
ALP SERPL-CCNC: 49 U/L
ALT SERPL W/O P-5'-P-CCNC: 11 U/L
ANION GAP SERPL CALC-SCNC: 6 MMOL/L
ANISOCYTOSIS BLD QL SMEAR: SLIGHT
AST SERPL-CCNC: 15 U/L
BASOPHILS # BLD AUTO: ABNORMAL K/UL
BASOPHILS NFR BLD: 0 %
BILIRUB SERPL-MCNC: 0.6 MG/DL
BUN SERPL-MCNC: 7 MG/DL
CALCIUM SERPL-MCNC: 7.4 MG/DL
CHLORIDE SERPL-SCNC: 105 MMOL/L
CO2 SERPL-SCNC: 26 MMOL/L
CREAT SERPL-MCNC: 0.7 MG/DL
DIFFERENTIAL METHOD: ABNORMAL
EOSINOPHIL # BLD AUTO: ABNORMAL K/UL
EOSINOPHIL NFR BLD: 1 %
ERYTHROCYTE [DISTWIDTH] IN BLOOD BY AUTOMATED COUNT: 13.8 %
EST. GFR  (AFRICAN AMERICAN): >60 ML/MIN/1.73 M^2
EST. GFR  (NON AFRICAN AMERICAN): >60 ML/MIN/1.73 M^2
GLUCOSE SERPL-MCNC: 279 MG/DL
HCT VFR BLD AUTO: 24.5 %
HGB BLD-MCNC: 8.1 G/DL
LYMPHOCYTES # BLD AUTO: ABNORMAL K/UL
LYMPHOCYTES NFR BLD: 14 %
MAGNESIUM SERPL-MCNC: 1.6 MG/DL
MCH RBC QN AUTO: 30.9 PG
MCHC RBC AUTO-ENTMCNC: 33.1 %
MCV RBC AUTO: 94 FL
MONOCYTES # BLD AUTO: ABNORMAL K/UL
MONOCYTES NFR BLD: 7 %
NEUTROPHILS NFR BLD: 76 %
NEUTS BAND NFR BLD MANUAL: 2 %
PHOSPHATE SERPL-MCNC: 2.4 MG/DL
PLATELET # BLD AUTO: 121 K/UL
PLATELET BLD QL SMEAR: ABNORMAL
PMV BLD AUTO: 11.7 FL
POCT GLUCOSE: 235 MG/DL (ref 70–110)
POCT GLUCOSE: 261 MG/DL (ref 70–110)
POCT GLUCOSE: 297 MG/DL (ref 70–110)
POCT GLUCOSE: 344 MG/DL (ref 70–110)
POLYCHROMASIA BLD QL SMEAR: ABNORMAL
POTASSIUM SERPL-SCNC: 3.7 MMOL/L
PROT SERPL-MCNC: 4.5 G/DL
RBC # BLD AUTO: 2.62 M/UL
SODIUM SERPL-SCNC: 137 MMOL/L
WBC # BLD AUTO: 10.66 K/UL

## 2017-04-07 PROCEDURE — 25000003 PHARM REV CODE 250: Performed by: SURGERY

## 2017-04-07 PROCEDURE — 63600175 PHARM REV CODE 636 W HCPCS: Performed by: SURGERY

## 2017-04-07 PROCEDURE — 83735 ASSAY OF MAGNESIUM: CPT

## 2017-04-07 PROCEDURE — 20600001 HC STEP DOWN PRIVATE ROOM

## 2017-04-07 PROCEDURE — 80053 COMPREHEN METABOLIC PANEL: CPT

## 2017-04-07 PROCEDURE — 99900035 HC TECH TIME PER 15 MIN (STAT)

## 2017-04-07 PROCEDURE — 25000003 PHARM REV CODE 250: Performed by: PHYSICIAN ASSISTANT

## 2017-04-07 PROCEDURE — 84100 ASSAY OF PHOSPHORUS: CPT

## 2017-04-07 PROCEDURE — 85007 BL SMEAR W/DIFF WBC COUNT: CPT

## 2017-04-07 PROCEDURE — C9113 INJ PANTOPRAZOLE SODIUM, VIA: HCPCS | Performed by: SURGERY

## 2017-04-07 PROCEDURE — 85027 COMPLETE CBC AUTOMATED: CPT

## 2017-04-07 RX ORDER — MAGNESIUM SULFATE HEPTAHYDRATE 40 MG/ML
2 INJECTION, SOLUTION INTRAVENOUS ONCE
Status: COMPLETED | OUTPATIENT
Start: 2017-04-07 | End: 2017-04-07

## 2017-04-07 RX ORDER — CLINDAMYCIN PHOSPHATE 900 MG/50ML
900 INJECTION, SOLUTION INTRAVENOUS
Status: DISCONTINUED | OUTPATIENT
Start: 2017-04-07 | End: 2017-04-14

## 2017-04-07 RX ORDER — ENOXAPARIN SODIUM 100 MG/ML
1 INJECTION SUBCUTANEOUS
Status: DISCONTINUED | OUTPATIENT
Start: 2017-04-07 | End: 2017-04-17

## 2017-04-07 RX ADMIN — POTASSIUM PHOSPHATE, MONOBASIC AND POTASSIUM PHOSPHATE, DIBASIC 30 MMOL: 224; 236 INJECTION, SOLUTION, CONCENTRATE INTRAVENOUS at 09:04

## 2017-04-07 RX ADMIN — Medication 10 ML: at 12:04

## 2017-04-07 RX ADMIN — ENOXAPARIN SODIUM 40 MG: 100 INJECTION SUBCUTANEOUS at 05:04

## 2017-04-07 RX ADMIN — Medication 1 CAPSULE: at 09:04

## 2017-04-07 RX ADMIN — I.V. FAT EMULSION 250 ML: 20 EMULSION INTRAVENOUS at 11:04

## 2017-04-07 RX ADMIN — PANTOPRAZOLE SODIUM 40 MG: 40 INJECTION, POWDER, FOR SOLUTION INTRAVENOUS at 09:04

## 2017-04-07 RX ADMIN — DEXTROSE MONOHYDRATE, SODIUM CHLORIDE, AND POTASSIUM CHLORIDE: 50; 4.5; 1.49 INJECTION, SOLUTION INTRAVENOUS at 01:04

## 2017-04-07 RX ADMIN — Medication 3 ML: at 10:04

## 2017-04-07 RX ADMIN — DEXTROSE MONOHYDRATE, SODIUM CHLORIDE, AND POTASSIUM CHLORIDE: 50; 4.5; 1.49 INJECTION, SOLUTION INTRAVENOUS at 11:04

## 2017-04-07 RX ADMIN — INSULIN ASPART 3 UNITS: 100 INJECTION, SOLUTION INTRAVENOUS; SUBCUTANEOUS at 01:04

## 2017-04-07 RX ADMIN — ASCORBIC ACID, VITAMIN A PALMITATE, CHOLECALCIFEROL, THIAMINE HYDROCHLORIDE, RIBOFLAVIN-5 PHOSPHATE SODIUM, PYRIDOXINE HYDROCHLORIDE, NIACINAMIDE, DEXPANTHENOL, ALPHA-TOCOPHEROL ACETATE, VITAMIN K1, FOLIC ACID, BIOTIN, CYANOCOBALAMIN: 200; 3300; 200; 6; 3.6; 6; 40; 15; 10; 150; 600; 60; 5 INJECTION, SOLUTION INTRAVENOUS at 11:04

## 2017-04-07 RX ADMIN — DEXTROSE MONOHYDRATE, SODIUM CHLORIDE, AND POTASSIUM CHLORIDE: 50; 4.5; 1.49 INJECTION, SOLUTION INTRAVENOUS at 12:04

## 2017-04-07 RX ADMIN — DIPHENHYDRAMINE HYDROCHLORIDE 12.5 MG: 50 INJECTION, SOLUTION INTRAMUSCULAR; INTRAVENOUS at 11:04

## 2017-04-07 RX ADMIN — MAGNESIUM SULFATE IN WATER 2 G: 40 INJECTION, SOLUTION INTRAVENOUS at 09:04

## 2017-04-07 RX ADMIN — INSULIN ASPART 4 UNITS: 100 INJECTION, SOLUTION INTRAVENOUS; SUBCUTANEOUS at 06:04

## 2017-04-07 RX ADMIN — ENOXAPARIN SODIUM 70 MG: 100 INJECTION SUBCUTANEOUS at 11:04

## 2017-04-07 RX ADMIN — INSULIN ASPART 3 UNITS: 100 INJECTION, SOLUTION INTRAVENOUS; SUBCUTANEOUS at 05:04

## 2017-04-07 RX ADMIN — CLINDAMYCIN IN 5 PERCENT DEXTROSE 900 MG: 18 INJECTION, SOLUTION INTRAVENOUS at 07:04

## 2017-04-07 RX ADMIN — Medication: at 07:04

## 2017-04-07 NOTE — PROGRESS NOTES
Informed Dr. Mc about pt having redness, swelling and what appears to be a blackhead on R forearm near AC. Pt states she noticed the area getting bigger today. Afebrile, no warmth to area. Dr. Mc stated she would come by and assess pt's arm.

## 2017-04-07 NOTE — PLAN OF CARE
Not medically stable for discharge;POD # 6; pain well managed with PCA; NPO, tolerating TPN. Still having bloating despite G tube to gravity. HH needs to be arranged;EUNICE JOSEPH, aware. CM will continue to follow.        04/07/17 1000   Right Care Assessment   Can the patient answer the patient profile reliably? Yes, cognitively intact   How often would a person be available to care for the patient? Often   Describe the patient's ability to walk at the present time. Walks with the help of equipment  (moderate assist per OT note)   How does the patient rate their overall health at the present time? Fair   Number of comorbid conditions (as recorded on the chart) Five or more   During the past month, has the patient often been bothered by feeling down, depressed or hopeless? No   During the past month, has the patient often been bothered by little interest or pleasure in doing things? No

## 2017-04-07 NOTE — PT/OT/SLP PROGRESS
Physical Therapy      Anayeli Judd  MRN: [id number]    Patient not seen today secondary to pt refusal reporting I am to tired right now, the nurses just got done doing things with me and I am tired, I can't walk right now.Will follow-up next scheduled treatment per PT POC.     Timoteo Gonzalez, PTA   4/7/2017

## 2017-04-07 NOTE — PLAN OF CARE
Problem: Patient Care Overview  Goal: Plan of Care Review  Outcome: Ongoing (interventions implemented as appropriate)  Plan of care reviewed with pt. Pt verbalized understanding. AAOx4. VSS on room air. Pain managed with PCA. Ambulated to bedside commode. Denied n/v. KOURTNEY intact. G to gravity. Remained free from falls or injuries. Call light within reach. Will call for assistance. No distress noted. Will continue to monitor.

## 2017-04-07 NOTE — PROGRESS NOTES
[facility]  General Surgery  Progress Note    Subjective:     Interval History:   Patient seen and examined this AM. Patient with more discomfort and bloating with clamped G tube yesterday; placed back to gravity drainage yesterday evening - put out only 40mL overnight, denies N/V/F/BM; pain well managed with PCA; tolerating TPN.  Still having bloating despite G tube to gravity    Post-Op Info:  Procedure(s) (LRB):  EXPLORATORY-LAPAROTOMY (N/A)  REPAIR  LYSIS-ADHESION (N/A)  PLACEMENT   6 Days Post-Op      Medications:  Continuous Infusions:   Amino acid 5% - dextrose 15% (CLINIMIX-E) solution with additives (1L  provides 510 kcal/L dextrose, with 50 gm AA, 150 gm CHO, Na 35, K 30, Mg 5, Ca 4.5, Acetate 80, Cl 39, Phos 15) 60 mL/hr at 04/06/17 2236    Amino acid 5% - dextrose 15% (CLINIMIX-E) solution with additives (1L  provides 510 kcal/L dextrose, with 50 gm AA, 150 gm CHO, Na 35, K 30, Mg 5, Ca 4.5, Acetate 80, Cl 39, Phos 15)      dextrose 5 % and 0.45 % NaCl with KCl 20 mEq 100 mL/hr at 04/07/17 0112    hydromorphone in 0.9 % NaCl 6 mg/30 ml       Scheduled Meds:   enoxaparin  40 mg Subcutaneous Q24H    fat emulsion 20%  250 mL Intravenous Daily    fat emulsion 20%  250 mL Intravenous Daily    Lactobacillus rhamnosus GG  1 capsule Oral Daily    magnesium sulfate IVPB  2 g Intravenous Once    pantoprazole  40 mg Intravenous Daily    potassium phosphate IVPB  30 mmol Intravenous Once    sodium chloride 0.9%  10 mL Intravenous Q6H    sodium chloride 0.9%  3 mL Intravenous Q8H     PRN Meds:dextrose 50%, diphenhydrAMINE, glucagon (human recombinant), insulin aspart, naloxone, ondansetron, promethazine (PHENERGAN) IVPB, Flushing PICC Protocol **AND** sodium chloride 0.9% **AND** sodium chloride 0.9%     Objective:     Vital Signs (Most Recent):  Temp: 96.7 °F (35.9 °C) (04/07/17 0407)  Pulse: 87 (04/07/17 0700)  Resp: 16 (04/07/17 0407)  BP: 130/62 (04/07/17 0407)  SpO2: (!) 93 %  (04/07/17 0407) Vital Signs (24h Range):  Temp:  [96.2 °F (35.7 °C)-97.8 °F (36.6 °C)] 96.7 °F (35.9 °C)  Pulse:  [72-96] 87  Resp:  [16-18] 16  SpO2:  [92 %-98 %] 93 %  BP: (103-130)/(51-62) 130/62       Intake/Output Summary (Last 24 hours) at 04/07/17 0824  Last data filed at 04/07/17 0500   Gross per 24 hour   Intake          4412.78 ml   Output             4665 ml   Net          -252.22 ml       Physical Exam   Constitutional: She appears well-developed and well-nourished. No distress.   HENT:   Head: Normocephalic and atraumatic.   Cardiovascular: Normal rate and regular rhythm.    Pulmonary/Chest: Effort normal. No respiratory distress.   Abdominal: She exhibits distension. Bowel sounds are decreased.   Soft, appropriate TTP, incision - staples in place, clean dry and intact  G-tube in place with bilious output       Significant Labs:  BMP:     Recent Labs  Lab 04/07/17  0400   *      K 3.7      CO2 26   BUN 7*   CREATININE 0.7   CALCIUM 7.4*   MG 1.6     CBC:     Recent Labs  Lab 04/06/17  0459   WBC 8.70   RBC 2.66*   HGB 8.2*   HCT 23.6*   *   MCV 89   MCH 30.8   MCHC 34.7     CMP:     Recent Labs  Lab 04/07/17  0400   *   CALCIUM 7.4*   ALBUMIN 1.6*   PROT 4.5*      K 3.7   CO2 26      BUN 7*   CREATININE 0.7   ALKPHOS 49*   ALT 11   AST 15   BILITOT 0.6     LFTs:     Recent Labs  Lab 04/07/17  0400   ALT 11   AST 15   ALKPHOS 49*   BILITOT 0.6   PROT 4.5*   ALBUMIN 1.6*     All pertinent labs from the last 24 hours have been reviewed.    Assessment/Plan:   73 yo female s/p ex lap with TIFFANY and G-tube placement    S/p ex lap  -ok for ice chips  -mIVF  -continue TPN  -continue PCA  -nausea meds PRN  -G tube to gravity drainage    DM  -home meds, insulin    Hypokalemia, hypophosphatemia, hypomagnesemia  -replace    Protein calorie malnutrition  -Prealbumin = 4  -starting TPN 4/6/17    Prophylaxis  DVT/GI    C. Martin Carney MD  PGY-3  Pager: [phone]

## 2017-04-08 LAB
ALBUMIN SERPL BCP-MCNC: 1.6 G/DL
ALP SERPL-CCNC: 52 U/L
ALT SERPL W/O P-5'-P-CCNC: 10 U/L
ANION GAP SERPL CALC-SCNC: 9 MMOL/L
ANISOCYTOSIS BLD QL SMEAR: SLIGHT
AST SERPL-CCNC: 15 U/L
BASOPHILS NFR BLD: 1 %
BILIRUB SERPL-MCNC: 0.7 MG/DL
BUN SERPL-MCNC: 8 MG/DL
CALCIUM SERPL-MCNC: 7.7 MG/DL
CHLORIDE SERPL-SCNC: 104 MMOL/L
CO2 SERPL-SCNC: 23 MMOL/L
CREAT SERPL-MCNC: 0.7 MG/DL
DIFFERENTIAL METHOD: ABNORMAL
EOSINOPHIL NFR BLD: 2 %
ERYTHROCYTE [DISTWIDTH] IN BLOOD BY AUTOMATED COUNT: 13.8 %
EST. GFR  (AFRICAN AMERICAN): >60 ML/MIN/1.73 M^2
EST. GFR  (NON AFRICAN AMERICAN): >60 ML/MIN/1.73 M^2
GLUCOSE SERPL-MCNC: 276 MG/DL
HCT VFR BLD AUTO: 22.7 %
HGB BLD-MCNC: 7.4 G/DL
HYPOCHROMIA BLD QL SMEAR: ABNORMAL
LYMPHOCYTES NFR BLD: 6 %
MAGNESIUM SERPL-MCNC: 1.5 MG/DL
MCH RBC QN AUTO: 30.6 PG
MCHC RBC AUTO-ENTMCNC: 32.6 %
MCV RBC AUTO: 94 FL
MONOCYTES NFR BLD: 0 %
NEUTROPHILS NFR BLD: 91 %
OVALOCYTES BLD QL SMEAR: ABNORMAL
PHOSPHATE SERPL-MCNC: 3.6 MG/DL
PLATELET # BLD AUTO: 129 K/UL
PLATELET BLD QL SMEAR: ABNORMAL
PMV BLD AUTO: 11.3 FL
POCT GLUCOSE: 271 MG/DL (ref 70–110)
POCT GLUCOSE: 302 MG/DL (ref 70–110)
POCT GLUCOSE: 303 MG/DL (ref 70–110)
POCT GLUCOSE: 314 MG/DL (ref 70–110)
POCT GLUCOSE: 359 MG/DL (ref 70–110)
POCT GLUCOSE: 382 MG/DL (ref 70–110)
POCT GLUCOSE: 391 MG/DL (ref 70–110)
POIKILOCYTOSIS BLD QL SMEAR: SLIGHT
POLYCHROMASIA BLD QL SMEAR: ABNORMAL
POTASSIUM SERPL-SCNC: 4.1 MMOL/L
PROT SERPL-MCNC: 4.6 G/DL
RBC # BLD AUTO: 2.42 M/UL
SODIUM SERPL-SCNC: 136 MMOL/L
WBC # BLD AUTO: 10.43 K/UL

## 2017-04-08 PROCEDURE — 94799 UNLISTED PULMONARY SVC/PX: CPT

## 2017-04-08 PROCEDURE — 84100 ASSAY OF PHOSPHORUS: CPT

## 2017-04-08 PROCEDURE — 83735 ASSAY OF MAGNESIUM: CPT

## 2017-04-08 PROCEDURE — C9113 INJ PANTOPRAZOLE SODIUM, VIA: HCPCS | Performed by: SURGERY

## 2017-04-08 PROCEDURE — 25000003 PHARM REV CODE 250: Performed by: SURGERY

## 2017-04-08 PROCEDURE — 85027 COMPLETE CBC AUTOMATED: CPT

## 2017-04-08 PROCEDURE — 20600001 HC STEP DOWN PRIVATE ROOM

## 2017-04-08 PROCEDURE — 25000003 PHARM REV CODE 250: Performed by: PHYSICIAN ASSISTANT

## 2017-04-08 PROCEDURE — 63600175 PHARM REV CODE 636 W HCPCS: Performed by: SURGERY

## 2017-04-08 PROCEDURE — 63600175 PHARM REV CODE 636 W HCPCS: Performed by: STUDENT IN AN ORGANIZED HEALTH CARE EDUCATION/TRAINING PROGRAM

## 2017-04-08 PROCEDURE — 25000003 PHARM REV CODE 250: Performed by: STUDENT IN AN ORGANIZED HEALTH CARE EDUCATION/TRAINING PROGRAM

## 2017-04-08 PROCEDURE — 94760 N-INVAS EAR/PLS OXIMETRY 1: CPT

## 2017-04-08 PROCEDURE — 80053 COMPREHEN METABOLIC PANEL: CPT

## 2017-04-08 PROCEDURE — 85007 BL SMEAR W/DIFF WBC COUNT: CPT

## 2017-04-08 PROCEDURE — 94664 DEMO&/EVAL PT USE INHALER: CPT

## 2017-04-08 RX ORDER — INSULIN ASPART 100 [IU]/ML
1-10 INJECTION, SOLUTION INTRAVENOUS; SUBCUTANEOUS EVERY 6 HOURS PRN
Status: DISCONTINUED | OUTPATIENT
Start: 2017-04-08 | End: 2017-04-11

## 2017-04-08 RX ORDER — SYRING-NEEDL,DISP,INSUL,0.3 ML 29 G X1/2"
300 SYRINGE, EMPTY DISPOSABLE MISCELLANEOUS
Status: COMPLETED | OUTPATIENT
Start: 2017-04-08 | End: 2017-04-08

## 2017-04-08 RX ORDER — MAGNESIUM SULFATE HEPTAHYDRATE 40 MG/ML
2 INJECTION, SOLUTION INTRAVENOUS ONCE
Status: COMPLETED | OUTPATIENT
Start: 2017-04-08 | End: 2017-04-08

## 2017-04-08 RX ORDER — PSEUDOEPHEDRINE/ACETAMINOPHEN 30MG-500MG
100 TABLET ORAL ONCE
Status: COMPLETED | OUTPATIENT
Start: 2017-04-08 | End: 2017-04-08

## 2017-04-08 RX ADMIN — SODIUM CHLORIDE 500 ML: 0.9 INJECTION, SOLUTION INTRAVENOUS at 12:04

## 2017-04-08 RX ADMIN — ASCORBIC ACID, VITAMIN A PALMITATE, CHOLECALCIFEROL, THIAMINE HYDROCHLORIDE, RIBOFLAVIN-5 PHOSPHATE SODIUM, PYRIDOXINE HYDROCHLORIDE, NIACINAMIDE, DEXPANTHENOL, ALPHA-TOCOPHEROL ACETATE, VITAMIN K1, FOLIC ACID, BIOTIN, CYANOCOBALAMIN: 200; 3300; 200; 6; 3.6; 6; 40; 15; 10; 150; 600; 60; 5 INJECTION, SOLUTION INTRAVENOUS at 11:04

## 2017-04-08 RX ADMIN — INSULIN ASPART 6 UNITS: 100 INJECTION, SOLUTION INTRAVENOUS; SUBCUTANEOUS at 01:04

## 2017-04-08 RX ADMIN — INSULIN ASPART 2 UNITS: 100 INJECTION, SOLUTION INTRAVENOUS; SUBCUTANEOUS at 02:04

## 2017-04-08 RX ADMIN — CLINDAMYCIN IN 5 PERCENT DEXTROSE 900 MG: 18 INJECTION, SOLUTION INTRAVENOUS at 10:04

## 2017-04-08 RX ADMIN — Medication 10 ML: at 06:04

## 2017-04-08 RX ADMIN — Medication 3 ML: at 01:04

## 2017-04-08 RX ADMIN — Medication 1 CAPSULE: at 09:04

## 2017-04-08 RX ADMIN — SOYBEAN OIL 250 ML: 20 INJECTION, SOLUTION INTRAVENOUS at 11:04

## 2017-04-08 RX ADMIN — MAGESIUM CITRATE 300 ML: 1.75 LIQUID ORAL at 12:04

## 2017-04-08 RX ADMIN — Medication 10 ML: at 12:04

## 2017-04-08 RX ADMIN — DEXTROSE MONOHYDRATE, SODIUM CHLORIDE, AND POTASSIUM CHLORIDE: 50; 4.5; 1.49 INJECTION, SOLUTION INTRAVENOUS at 11:04

## 2017-04-08 RX ADMIN — MAGNESIUM SULFATE IN WATER 2 G: 40 INJECTION, SOLUTION INTRAVENOUS at 10:04

## 2017-04-08 RX ADMIN — ENOXAPARIN SODIUM 70 MG: 100 INJECTION SUBCUTANEOUS at 11:04

## 2017-04-08 RX ADMIN — PANTOPRAZOLE SODIUM 40 MG: 40 INJECTION, POWDER, FOR SOLUTION INTRAVENOUS at 09:04

## 2017-04-08 RX ADMIN — DEXTROSE MONOHYDRATE, SODIUM CHLORIDE, AND POTASSIUM CHLORIDE: 50; 4.5; 1.49 INJECTION, SOLUTION INTRAVENOUS at 12:04

## 2017-04-08 RX ADMIN — INSULIN ASPART 4 UNITS: 100 INJECTION, SOLUTION INTRAVENOUS; SUBCUTANEOUS at 08:04

## 2017-04-08 RX ADMIN — Medication 3 ML: at 10:04

## 2017-04-08 RX ADMIN — ENOXAPARIN SODIUM 70 MG: 100 INJECTION SUBCUTANEOUS at 10:04

## 2017-04-08 RX ADMIN — CLINDAMYCIN IN 5 PERCENT DEXTROSE 900 MG: 18 INJECTION, SOLUTION INTRAVENOUS at 06:04

## 2017-04-08 RX ADMIN — INSULIN ASPART 3 UNITS: 100 INJECTION, SOLUTION INTRAVENOUS; SUBCUTANEOUS at 01:04

## 2017-04-08 RX ADMIN — Medication 100 ML: at 12:04

## 2017-04-08 RX ADMIN — CLINDAMYCIN IN 5 PERCENT DEXTROSE 900 MG: 18 INJECTION, SOLUTION INTRAVENOUS at 01:04

## 2017-04-08 RX ADMIN — INSULIN ASPART 10 UNITS: 100 INJECTION, SOLUTION INTRAVENOUS; SUBCUTANEOUS at 08:04

## 2017-04-08 RX ADMIN — Medication: at 07:04

## 2017-04-08 NOTE — PROGRESS NOTES
[facility]  General Surgery  Progress Note    Subjective:     Interval History:   Patient seen and examined this AM. G tube to gravity, with 200 cc out overnight, denies N/V/F/BM; pain well managed with PCA; tolerating TPN.  On therapeutic lovenox for right arm DVT.    Post-Op Info:  Procedure(s) (LRB):  EXPLORATORY-LAPAROTOMY (N/A)  REPAIR  LYSIS-ADHESION (N/A)  PLACEMENT   7 Days Post-Op      Medications:  Continuous Infusions:   Amino acid 5% - dextrose 15% (CLINIMIX-E) solution with additives (1L  provides 510 kcal/L dextrose, with 50 gm AA, 150 gm CHO, Na 35, K 30, Mg 5, Ca 4.5, Acetate 80, Cl 39, Phos 15) 60 mL/hr at 04/07/17 2307    dextrose 5 % and 0.45 % NaCl with KCl 20 mEq 100 mL/hr at 04/07/17 2340    hydromorphone in 0.9 % NaCl 6 mg/30 ml       Scheduled Meds:   clindamycin (CLEOCIN) IVPB  900 mg Intravenous Q8H    enoxaparin  1 mg/kg Subcutaneous Q12H    fat emulsion 20%  250 mL Intravenous Daily    Lactobacillus rhamnosus GG  1 capsule Oral Daily    pantoprazole  40 mg Intravenous Daily    sodium chloride 0.9%  10 mL Intravenous Q6H    sodium chloride 0.9%  3 mL Intravenous Q8H     PRN Meds:dextrose 50%, diphenhydrAMINE, glucagon (human recombinant), insulin aspart, naloxone, ondansetron, promethazine (PHENERGAN) IVPB, Flushing PICC Protocol **AND** sodium chloride 0.9% **AND** sodium chloride 0.9%     Objective:     Vital Signs (Most Recent):  Temp: 98.6 °F (37 °C) (04/08/17 0839)  Pulse: 89 (04/08/17 0839)  Resp: 20 (04/08/17 0839)  BP: (!) 107/53 (04/08/17 0839)  SpO2: 98 % (04/08/17 0839) Vital Signs (24h Range):  Temp:  [96.6 °F (35.9 °C)-98.6 °F (37 °C)] 98.6 °F (37 °C)  Pulse:  [] 89  Resp:  [17-20] 20  SpO2:  [93 %-98 %] 98 %  BP: (107-133)/(53-63) 107/53       Intake/Output Summary (Last 24 hours) at 04/08/17 0928  Last data filed at 04/08/17 0622   Gross per 24 hour   Intake           3441.7 ml   Output             3715 ml   Net           -273.3 ml        Physical Exam   Constitutional: She appears well-developed and well-nourished. No distress.   HENT:   Head: Normocephalic and atraumatic.   Cardiovascular: Normal rate and regular rhythm.    Pulmonary/Chest: Effort normal. No respiratory distress.   Abdominal: She exhibits distension. Bowel sounds are decreased.   Soft, appropriate TTP, incision - staples in place, clean dry and intact  G-tube in place with bilious output to gravity  Right arm swelling and erythema resolving  RLQ drain cloudy SS       Significant Labs:  BMP:     Recent Labs  Lab 04/08/17  0306   *      K 4.1      CO2 23   BUN 8   CREATININE 0.7   CALCIUM 7.7*   MG 1.5*     CBC:     Recent Labs  Lab 04/08/17  0306   WBC 10.43   RBC 2.42*   HGB 7.4*   HCT 22.7*   *   MCV 94   MCH 30.6   MCHC 32.6     CMP:     Recent Labs  Lab 04/08/17  0306   *   CALCIUM 7.7*   ALBUMIN 1.6*   PROT 4.6*      K 4.1   CO2 23      BUN 8   CREATININE 0.7   ALKPHOS 52*   ALT 10   AST 15   BILITOT 0.7     LFTs:     Recent Labs  Lab 04/08/17  0306   ALT 10   AST 15   ALKPHOS 52*   BILITOT 0.7   PROT 4.6*   ALBUMIN 1.6*     All pertinent labs from the last 24 hours have been reviewed.    Assessment/Plan:   73 yo female s/p ex lap with TIFFANY and G-tube placement    S/p ex lap  -ok for ice chips  -mIVF  -continue TPN  -continue PCA  -nausea meds PRN  -G tube to gravity drainage    DM  -home meds, insulin    Hypomagnesemia  -replace    Protein calorie malnutrition  -Prealbumin = 4  -starting TPN 4/6/17  - enema for today    Right arm brachial vein thrombosis  - therapeutic lovenox    Prophylaxis  DVT/GI    [facility]  General Surgery PGY1  Pager: [phone]

## 2017-04-08 NOTE — PLAN OF CARE
Problem: Patient Care Overview  Goal: Plan of Care Review  Outcome: Ongoing (interventions implemented as appropriate)  POC reviewed with pt and pt's family. VSS on room air. NSR on tele monitoring. Remains NPO with ice chips. ML incision with scant drainage, gauze applied prn. KOURTNEY intact. No increased redness to R arm. Brown bomb given with 5-6 hard stool pieces output. Walking in espitia with walker. accuchecks Q6H with s/s coverage needed. Flushing PEG with 20 ml water Q6H, PEG to gravity bag. Denies nausea; no emesis. Pain 5/10 with relief from PCA. No falls or injuries, call bell in reach, pt up in chair. WCTM.

## 2017-04-08 NOTE — NURSING TRANSFER
Nursing Transfer Note      4/7/2017     Transfer To: [facility]    Transfer via stretcher    Transfer with cardiac monitoring    Transported by transport    Medicines sent: iv fluids infusing    Chart send with patient: No    Notified: family

## 2017-04-08 NOTE — PROGRESS NOTES
Brown bomb given with the majority of the solution coming out onto bed pad. Pt was able to have 5-6 small round stools while sitting on bedside commode with scant bleeding. WCTM.

## 2017-04-08 NOTE — NURSING TRANSFER
Nursing Transfer Note      4/7/2017     Transfer To: 640 from US    Transfer via stretcher    Transfer with cardiac monitoring    Transported by transport    Medicines sent: per IV pole    Chart send with patient: No    Notified: daughter    Patient reassessed at: 4/7/171940    Upon arrival to floor: patient oriented to room, call bell in reach and bed in lowest position

## 2017-04-09 LAB
ALBUMIN SERPL BCP-MCNC: 1.6 G/DL
ALP SERPL-CCNC: 59 U/L
ALT SERPL W/O P-5'-P-CCNC: 13 U/L
ANION GAP SERPL CALC-SCNC: 8 MMOL/L
ANISOCYTOSIS BLD QL SMEAR: SLIGHT
AST SERPL-CCNC: 17 U/L
BASOPHILS # BLD AUTO: ABNORMAL K/UL
BASOPHILS NFR BLD: 0 %
BILIRUB SERPL-MCNC: 0.6 MG/DL
BUN SERPL-MCNC: 9 MG/DL
CALCIUM SERPL-MCNC: 8 MG/DL
CHLORIDE SERPL-SCNC: 103 MMOL/L
CO2 SERPL-SCNC: 24 MMOL/L
CREAT SERPL-MCNC: 0.7 MG/DL
DIFFERENTIAL METHOD: ABNORMAL
EOSINOPHIL # BLD AUTO: ABNORMAL K/UL
EOSINOPHIL NFR BLD: 4 %
ERYTHROCYTE [DISTWIDTH] IN BLOOD BY AUTOMATED COUNT: 13.8 %
EST. GFR  (AFRICAN AMERICAN): >60 ML/MIN/1.73 M^2
EST. GFR  (NON AFRICAN AMERICAN): >60 ML/MIN/1.73 M^2
GLUCOSE SERPL-MCNC: 296 MG/DL
HCT VFR BLD AUTO: 22.3 %
HGB BLD-MCNC: 7.6 G/DL
HYPOCHROMIA BLD QL SMEAR: ABNORMAL
LYMPHOCYTES # BLD AUTO: ABNORMAL K/UL
LYMPHOCYTES NFR BLD: 13 %
MAGNESIUM SERPL-MCNC: 1.4 MG/DL
MCH RBC QN AUTO: 30.9 PG
MCHC RBC AUTO-ENTMCNC: 34.1 %
MCV RBC AUTO: 91 FL
MONOCYTES # BLD AUTO: ABNORMAL K/UL
MONOCYTES NFR BLD: 2 %
MYELOCYTES NFR BLD MANUAL: 1 %
NEUTROPHILS NFR BLD: 79 %
NEUTS BAND NFR BLD MANUAL: 1 %
OVALOCYTES BLD QL SMEAR: ABNORMAL
PHOSPHATE SERPL-MCNC: 4.1 MG/DL
PLATELET # BLD AUTO: 156 K/UL
PMV BLD AUTO: 10.9 FL
POCT GLUCOSE: 158 MG/DL (ref 70–110)
POCT GLUCOSE: 228 MG/DL (ref 70–110)
POCT GLUCOSE: 270 MG/DL (ref 70–110)
POCT GLUCOSE: 275 MG/DL (ref 70–110)
POCT GLUCOSE: 275 MG/DL (ref 70–110)
POCT GLUCOSE: 279 MG/DL (ref 70–110)
POCT GLUCOSE: 292 MG/DL (ref 70–110)
POCT GLUCOSE: 307 MG/DL (ref 70–110)
POCT GLUCOSE: 311 MG/DL (ref 70–110)
POCT GLUCOSE: 327 MG/DL (ref 70–110)
POCT GLUCOSE: 328 MG/DL (ref 70–110)
POCT GLUCOSE: 328 MG/DL (ref 70–110)
POCT GLUCOSE: 338 MG/DL (ref 70–110)
POCT GLUCOSE: 357 MG/DL (ref 70–110)
POIKILOCYTOSIS BLD QL SMEAR: SLIGHT
POLYCHROMASIA BLD QL SMEAR: ABNORMAL
POTASSIUM SERPL-SCNC: 4.4 MMOL/L
PROT SERPL-MCNC: 5 G/DL
RBC # BLD AUTO: 2.46 M/UL
SODIUM SERPL-SCNC: 135 MMOL/L
WBC # BLD AUTO: 7.61 K/UL

## 2017-04-09 PROCEDURE — 20600001 HC STEP DOWN PRIVATE ROOM

## 2017-04-09 PROCEDURE — 63600175 PHARM REV CODE 636 W HCPCS: Performed by: SURGERY

## 2017-04-09 PROCEDURE — C9113 INJ PANTOPRAZOLE SODIUM, VIA: HCPCS | Performed by: SURGERY

## 2017-04-09 PROCEDURE — 25000003 PHARM REV CODE 250: Performed by: PHYSICIAN ASSISTANT

## 2017-04-09 PROCEDURE — 83735 ASSAY OF MAGNESIUM: CPT

## 2017-04-09 PROCEDURE — 80053 COMPREHEN METABOLIC PANEL: CPT

## 2017-04-09 PROCEDURE — 84100 ASSAY OF PHOSPHORUS: CPT

## 2017-04-09 PROCEDURE — 25000003 PHARM REV CODE 250: Performed by: SURGERY

## 2017-04-09 PROCEDURE — 99900035 HC TECH TIME PER 15 MIN (STAT)

## 2017-04-09 PROCEDURE — 85027 COMPLETE CBC AUTOMATED: CPT

## 2017-04-09 PROCEDURE — 36415 COLL VENOUS BLD VENIPUNCTURE: CPT

## 2017-04-09 PROCEDURE — 25000003 PHARM REV CODE 250: Performed by: STUDENT IN AN ORGANIZED HEALTH CARE EDUCATION/TRAINING PROGRAM

## 2017-04-09 PROCEDURE — 85007 BL SMEAR W/DIFF WBC COUNT: CPT

## 2017-04-09 PROCEDURE — 94664 DEMO&/EVAL PT USE INHALER: CPT

## 2017-04-09 PROCEDURE — 63600175 PHARM REV CODE 636 W HCPCS: Performed by: STUDENT IN AN ORGANIZED HEALTH CARE EDUCATION/TRAINING PROGRAM

## 2017-04-09 RX ORDER — MAGNESIUM SULFATE HEPTAHYDRATE 40 MG/ML
2 INJECTION, SOLUTION INTRAVENOUS ONCE
Status: COMPLETED | OUTPATIENT
Start: 2017-04-09 | End: 2017-04-09

## 2017-04-09 RX ORDER — CYCLOBENZAPRINE HCL 5 MG
5 TABLET ORAL DAILY PRN
Status: DISCONTINUED | OUTPATIENT
Start: 2017-04-09 | End: 2017-04-20 | Stop reason: HOSPADM

## 2017-04-09 RX ADMIN — ENOXAPARIN SODIUM 70 MG: 100 INJECTION SUBCUTANEOUS at 11:04

## 2017-04-09 RX ADMIN — Medication 3 ML: at 01:04

## 2017-04-09 RX ADMIN — Medication 10 ML: at 06:04

## 2017-04-09 RX ADMIN — ENOXAPARIN SODIUM 70 MG: 100 INJECTION SUBCUTANEOUS at 10:04

## 2017-04-09 RX ADMIN — SODIUM CHLORIDE 6.7 UNITS/HR: 9 INJECTION, SOLUTION INTRAVENOUS at 08:04

## 2017-04-09 RX ADMIN — ASCORBIC ACID, VITAMIN A PALMITATE, CHOLECALCIFEROL, THIAMINE HYDROCHLORIDE, RIBOFLAVIN-5 PHOSPHATE SODIUM, PYRIDOXINE HYDROCHLORIDE, NIACINAMIDE, DEXPANTHENOL, ALPHA-TOCOPHEROL ACETATE, VITAMIN K1, FOLIC ACID, BIOTIN, CYANOCOBALAMIN: 200; 3300; 200; 6; 3.6; 6; 40; 15; 10; 150; 600; 60; 5 INJECTION, SOLUTION INTRAVENOUS at 10:04

## 2017-04-09 RX ADMIN — SODIUM CHLORIDE 3.7 UNITS/HR: 9 INJECTION, SOLUTION INTRAVENOUS at 09:04

## 2017-04-09 RX ADMIN — CLINDAMYCIN IN 5 PERCENT DEXTROSE 900 MG: 18 INJECTION, SOLUTION INTRAVENOUS at 06:04

## 2017-04-09 RX ADMIN — Medication 10 ML: at 11:04

## 2017-04-09 RX ADMIN — CLINDAMYCIN IN 5 PERCENT DEXTROSE 900 MG: 18 INJECTION, SOLUTION INTRAVENOUS at 10:04

## 2017-04-09 RX ADMIN — INSULIN ASPART 8 UNITS: 100 INJECTION, SOLUTION INTRAVENOUS; SUBCUTANEOUS at 06:04

## 2017-04-09 RX ADMIN — Medication 10 ML: at 05:04

## 2017-04-09 RX ADMIN — CYCLOBENZAPRINE HYDROCHLORIDE 5 MG: 5 TABLET, FILM COATED ORAL at 01:04

## 2017-04-09 RX ADMIN — INSULIN ASPART 8 UNITS: 100 INJECTION, SOLUTION INTRAVENOUS; SUBCUTANEOUS at 10:04

## 2017-04-09 RX ADMIN — Medication: at 07:04

## 2017-04-09 RX ADMIN — Medication 10 ML: at 12:04

## 2017-04-09 RX ADMIN — SODIUM CHLORIDE 1 UNITS/HR: 9 INJECTION, SOLUTION INTRAVENOUS at 11:04

## 2017-04-09 RX ADMIN — DEXTROSE MONOHYDRATE, SODIUM CHLORIDE, AND POTASSIUM CHLORIDE: 50; 4.5; 1.49 INJECTION, SOLUTION INTRAVENOUS at 11:04

## 2017-04-09 RX ADMIN — SODIUM CHLORIDE 1.8 UNITS/HR: 9 INJECTION, SOLUTION INTRAVENOUS at 10:04

## 2017-04-09 RX ADMIN — MAGNESIUM SULFATE IN WATER 2 G: 40 INJECTION, SOLUTION INTRAVENOUS at 08:04

## 2017-04-09 RX ADMIN — Medication 1 CAPSULE: at 09:04

## 2017-04-09 RX ADMIN — Medication 3 ML: at 06:04

## 2017-04-09 RX ADMIN — DEXTROSE MONOHYDRATE, SODIUM CHLORIDE, AND POTASSIUM CHLORIDE: 50; 4.5; 1.49 INJECTION, SOLUTION INTRAVENOUS at 10:04

## 2017-04-09 RX ADMIN — CLINDAMYCIN IN 5 PERCENT DEXTROSE 900 MG: 18 INJECTION, SOLUTION INTRAVENOUS at 02:04

## 2017-04-09 RX ADMIN — PANTOPRAZOLE SODIUM 40 MG: 40 INJECTION, POWDER, FOR SOLUTION INTRAVENOUS at 09:04

## 2017-04-09 RX ADMIN — Medication 3 ML: at 10:04

## 2017-04-09 RX ADMIN — I.V. FAT EMULSION 250 ML: 20 EMULSION INTRAVENOUS at 09:04

## 2017-04-09 RX ADMIN — DIPHENHYDRAMINE HYDROCHLORIDE 12.5 MG: 50 INJECTION, SOLUTION INTRAMUSCULAR; INTRAVENOUS at 10:04

## 2017-04-09 NOTE — PROGRESS NOTES
G clamped for 6 hours, flushed with 20 ml and put to gravity bag. Residual: 25 ml. G clamped back and to be unclamped again at 0000.

## 2017-04-09 NOTE — PLAN OF CARE
Problem: Patient Care Overview  Goal: Plan of Care Review  Outcome: Ongoing (interventions implemented as appropriate)  POC reviewed with pt and pt's family. VSS on room air. NSR on tele monitoring. Remains NPO with ice chips. ML incision with scant drainage, dressing changed by MD after MD removed a few staples from top of incision. KOURTNEY intact, drainage more creamy brown. Increased redness to R arm. No bowel movements today. Walking in espitia with walker. Started on insulin gtt, checking glucose Q1H. Flushing PEG with 20 ml water Q6H, PEG clamped and checking residual Q6H. Denies nausea; no emesis. Pain 5/10 with relief from PCA. No falls or injuries, call bell in reach, pt up in chair. WCTM.

## 2017-04-09 NOTE — PROGRESS NOTES
MD (Juan) notified regarding patient's BG of 359. RN administered 10 units of insulin per sliding scale. No new orders given. Will continue to monitor.

## 2017-04-09 NOTE — PROGRESS NOTES
[facility]  General Surgery  Progress Note    Subjective:     Interval History:   Patient seen and examined this AM. G tube to gravity, with 125 cc out overnight, denies N/V. Had 6 small, hard stools overnight. pain well managed with PCA. TPN in fusing.  On therapeutic lovenox for right arm DVT. Endorses some increased redness on right arm.    Post-Op Info:  Procedure(s) (LRB):  EXPLORATORY-LAPAROTOMY (N/A)  REPAIR  LYSIS-ADHESION (N/A)  PLACEMENT   8 Days Post-Op      Medications:  Continuous Infusions:   Amino acid 5% - dextrose 15% (CLINIMIX-E) solution with additives (1L  provides 510 kcal/L dextrose, with 50 gm AA, 150 gm CHO, Na 35, K 30, Mg 5, Ca 4.5, Acetate 80, Cl 39, Phos 15) 60 mL/hr at 04/08/17 2302    Amino acid 5% - dextrose 15% (CLINIMIX-E) solution with additives (1L  provides 510 kcal/L dextrose, with 50 gm AA, 150 gm CHO, Na 35, K 30, Mg 5, Ca 4.5, Acetate 80, Cl 39, Phos 15)      dextrose 5 % and 0.45 % NaCl with KCl 20 mEq 100 mL/hr at 04/09/17 1041    hydromorphone in 0.9 % NaCl 6 mg/30 ml      insulin (HUMAN R) infusion (adults)       Scheduled Meds:   clindamycin (CLEOCIN) IVPB  900 mg Intravenous Q8H    enoxaparin  1 mg/kg Subcutaneous Q12H    fat emulsion 20%  250 mL Intravenous Daily    fat emulsion 20%  250 mL Intravenous Daily    Lactobacillus rhamnosus GG  1 capsule Oral Daily    pantoprazole  40 mg Intravenous Daily    sodium chloride 0.9%  10 mL Intravenous Q6H    sodium chloride 0.9%  3 mL Intravenous Q8H     PRN Meds:dextrose 50%, dextrose 50%, dextrose 50%, diphenhydrAMINE, glucagon (human recombinant), insulin aspart, naloxone, ondansetron, promethazine (PHENERGAN) IVPB, Flushing PICC Protocol **AND** sodium chloride 0.9% **AND** sodium chloride 0.9%     Objective:     Vital Signs (Most Recent):  Temp: 98.1 °F (36.7 °C) (04/09/17 0804)  Pulse: 87 (04/09/17 0804)  Resp: 16 (04/09/17 0804)  BP: (!) 101/49 (04/09/17 0804)  SpO2: 97 % (04/09/17 0804)  Vital Signs (24h Range):  Temp:  [97.3 °F (36.3 °C)-98.2 °F (36.8 °C)] 98.1 °F (36.7 °C)  Pulse:  [77-87] 87  Resp:  [14-20] 16  SpO2:  [95 %-98 %] 97 %  BP: (100-131)/(49-60) 101/49       Intake/Output Summary (Last 24 hours) at 04/09/17 1055  Last data filed at 04/09/17 1041   Gross per 24 hour   Intake          3983.29 ml   Output             4740 ml   Net          -756.71 ml       Physical Exam   Constitutional: She appears well-developed and well-nourished. No distress.   HENT:   Head: Normocephalic and atraumatic.   Cardiovascular: Normal rate and regular rhythm.    Pulmonary/Chest: Effort normal. No respiratory distress.   Abdominal:   Soft, appropriate TTP, incision - staples in place, clean dry and intact. Increased erythema surrounding incision, especially at superior aspect.   G-tube in place with bilious output to gravity  Right arm swelling and erythema worsening but no fluctuance or induration   RLQ drain\ output with increased purulence    Significant Labs:  BMP:     Recent Labs  Lab 04/09/17  0500   *   *   K 4.4      CO2 24   BUN 9   CREATININE 0.7   CALCIUM 8.0*   MG 1.4*     CBC:     Recent Labs  Lab 04/09/17  0500   WBC 7.61   RBC 2.46*   HGB 7.6*   HCT 22.3*      MCV 91   MCH 30.9   MCHC 34.1     CMP:     Recent Labs  Lab 04/09/17  0500   *   CALCIUM 8.0*   ALBUMIN 1.6*   PROT 5.0*   *   K 4.4   CO2 24      BUN 9   CREATININE 0.7   ALKPHOS 59   ALT 13   AST 17   BILITOT 0.6     LFTs:     Recent Labs  Lab 04/09/17  0500   ALT 13   AST 17   ALKPHOS 59   BILITOT 0.6   PROT 5.0*   ALBUMIN 1.6*     All pertinent labs from the last 24 hours have been reviewed.    Assessment/Plan:   73 yo female s/p ex lap with TIFFANY and G-tube placement    S/p ex lap  -ok for ice chips  -mIVF  -continue TPN  -continue PCA  -nausea meds PRN  -G tube clamping trials with q6h residuals, unclamp for nausea/vomiting or increased abdominal pain  - purulent drain output likely from  subcutaneous infection, will remove some staples from superior incision    DM  - home meds  - starting insulin gtt for continued hyperglycemia    Hypomagnesemia  -replace    Protein calorie malnutrition  -Prealbumin = 4  -starting TPN 4/6/17    Right arm brachial vein thrombosis  - therapeutic lovenox  - warm compresses  - no signs of fluid pocket amenable to drainage but will continue to monitor    Prophylaxis  LEA Sanchez  General Surgery PGY1  Pager: [phone]

## 2017-04-10 LAB
ALBUMIN SERPL BCP-MCNC: 1.7 G/DL
ALP SERPL-CCNC: 56 U/L
ALT SERPL W/O P-5'-P-CCNC: 13 U/L
ANION GAP SERPL CALC-SCNC: 7 MMOL/L
AST SERPL-CCNC: 16 U/L
BASOPHILS # BLD AUTO: 0.02 K/UL
BASOPHILS NFR BLD: 0.3 %
BILIRUB SERPL-MCNC: 0.5 MG/DL
BUN SERPL-MCNC: 8 MG/DL
CALCIUM SERPL-MCNC: 8.3 MG/DL
CHLORIDE SERPL-SCNC: 102 MMOL/L
CO2 SERPL-SCNC: 23 MMOL/L
CREAT SERPL-MCNC: 0.7 MG/DL
DIFFERENTIAL METHOD: ABNORMAL
EOSINOPHIL # BLD AUTO: 0.3 K/UL
EOSINOPHIL NFR BLD: 3.6 %
ERYTHROCYTE [DISTWIDTH] IN BLOOD BY AUTOMATED COUNT: 14.1 %
EST. GFR  (AFRICAN AMERICAN): >60 ML/MIN/1.73 M^2
EST. GFR  (NON AFRICAN AMERICAN): >60 ML/MIN/1.73 M^2
GLUCOSE SERPL-MCNC: 254 MG/DL
HCT VFR BLD AUTO: 23.6 %
HGB BLD-MCNC: 7.8 G/DL
LYMPHOCYTES # BLD AUTO: 1.6 K/UL
LYMPHOCYTES NFR BLD: 19.6 %
MAGNESIUM SERPL-MCNC: 1.2 MG/DL
MCH RBC QN AUTO: 30.4 PG
MCHC RBC AUTO-ENTMCNC: 33.1 %
MCV RBC AUTO: 92 FL
MONOCYTES # BLD AUTO: 0.6 K/UL
MONOCYTES NFR BLD: 7.1 %
NEUTROPHILS # BLD AUTO: 5.4 K/UL
NEUTROPHILS NFR BLD: 68 %
PHOSPHATE SERPL-MCNC: 4.2 MG/DL
PLATELET # BLD AUTO: 173 K/UL
PMV BLD AUTO: 11.6 FL
POCT GLUCOSE: 108 MG/DL (ref 70–110)
POCT GLUCOSE: 125 MG/DL (ref 70–110)
POCT GLUCOSE: 148 MG/DL (ref 70–110)
POCT GLUCOSE: 151 MG/DL (ref 70–110)
POCT GLUCOSE: 151 MG/DL (ref 70–110)
POCT GLUCOSE: 152 MG/DL (ref 70–110)
POCT GLUCOSE: 160 MG/DL (ref 70–110)
POCT GLUCOSE: 164 MG/DL (ref 70–110)
POCT GLUCOSE: 166 MG/DL (ref 70–110)
POCT GLUCOSE: 177 MG/DL (ref 70–110)
POCT GLUCOSE: 178 MG/DL (ref 70–110)
POCT GLUCOSE: 181 MG/DL (ref 70–110)
POCT GLUCOSE: 183 MG/DL (ref 70–110)
POCT GLUCOSE: 204 MG/DL (ref 70–110)
POCT GLUCOSE: 259 MG/DL (ref 70–110)
POCT GLUCOSE: 278 MG/DL (ref 70–110)
POCT GLUCOSE: 281 MG/DL (ref 70–110)
POCT GLUCOSE: 292 MG/DL (ref 70–110)
POCT GLUCOSE: 294 MG/DL (ref 70–110)
POCT GLUCOSE: 294 MG/DL (ref 70–110)
POTASSIUM SERPL-SCNC: 4.4 MMOL/L
PREALB SERPL-MCNC: 7 MG/DL
PROT SERPL-MCNC: 5.4 G/DL
RBC # BLD AUTO: 2.57 M/UL
SODIUM SERPL-SCNC: 132 MMOL/L
WBC # BLD AUTO: 7.99 K/UL

## 2017-04-10 PROCEDURE — 97116 GAIT TRAINING THERAPY: CPT

## 2017-04-10 PROCEDURE — 25000003 PHARM REV CODE 250: Performed by: PHYSICIAN ASSISTANT

## 2017-04-10 PROCEDURE — 63600175 PHARM REV CODE 636 W HCPCS: Performed by: PHYSICIAN ASSISTANT

## 2017-04-10 PROCEDURE — 83735 ASSAY OF MAGNESIUM: CPT

## 2017-04-10 PROCEDURE — 63600175 PHARM REV CODE 636 W HCPCS: Performed by: SURGERY

## 2017-04-10 PROCEDURE — 84100 ASSAY OF PHOSPHORUS: CPT

## 2017-04-10 PROCEDURE — 20600001 HC STEP DOWN PRIVATE ROOM

## 2017-04-10 PROCEDURE — 25000003 PHARM REV CODE 250: Performed by: SURGERY

## 2017-04-10 PROCEDURE — 84134 ASSAY OF PREALBUMIN: CPT

## 2017-04-10 PROCEDURE — 25000003 PHARM REV CODE 250: Performed by: STUDENT IN AN ORGANIZED HEALTH CARE EDUCATION/TRAINING PROGRAM

## 2017-04-10 PROCEDURE — C9113 INJ PANTOPRAZOLE SODIUM, VIA: HCPCS | Performed by: SURGERY

## 2017-04-10 PROCEDURE — 80053 COMPREHEN METABOLIC PANEL: CPT

## 2017-04-10 PROCEDURE — 63600175 PHARM REV CODE 636 W HCPCS: Performed by: STUDENT IN AN ORGANIZED HEALTH CARE EDUCATION/TRAINING PROGRAM

## 2017-04-10 PROCEDURE — 97110 THERAPEUTIC EXERCISES: CPT

## 2017-04-10 PROCEDURE — 85025 COMPLETE CBC W/AUTO DIFF WBC: CPT

## 2017-04-10 RX ORDER — MAGNESIUM SULFATE HEPTAHYDRATE 40 MG/ML
2 INJECTION, SOLUTION INTRAVENOUS ONCE
Status: COMPLETED | OUTPATIENT
Start: 2017-04-10 | End: 2017-04-10

## 2017-04-10 RX ADMIN — MAGNESIUM SULFATE IN WATER 2 G: 40 INJECTION, SOLUTION INTRAVENOUS at 09:04

## 2017-04-10 RX ADMIN — Medication 3 ML: at 02:04

## 2017-04-10 RX ADMIN — Medication 3 ML: at 06:04

## 2017-04-10 RX ADMIN — CLINDAMYCIN IN 5 PERCENT DEXTROSE 900 MG: 18 INJECTION, SOLUTION INTRAVENOUS at 02:04

## 2017-04-10 RX ADMIN — Medication 10 ML: at 12:04

## 2017-04-10 RX ADMIN — Medication 10 ML: at 06:04

## 2017-04-10 RX ADMIN — PANTOPRAZOLE SODIUM 40 MG: 40 INJECTION, POWDER, FOR SOLUTION INTRAVENOUS at 08:04

## 2017-04-10 RX ADMIN — SODIUM CHLORIDE 6.2 UNITS/HR: 9 INJECTION, SOLUTION INTRAVENOUS at 08:04

## 2017-04-10 RX ADMIN — CLINDAMYCIN IN 5 PERCENT DEXTROSE 900 MG: 18 INJECTION, SOLUTION INTRAVENOUS at 10:04

## 2017-04-10 RX ADMIN — CLINDAMYCIN IN 5 PERCENT DEXTROSE 900 MG: 18 INJECTION, SOLUTION INTRAVENOUS at 06:04

## 2017-04-10 RX ADMIN — Medication 1 CAPSULE: at 08:04

## 2017-04-10 RX ADMIN — SOYBEAN OIL 250 ML: 20 INJECTION, SOLUTION INTRAVENOUS at 10:04

## 2017-04-10 RX ADMIN — ENOXAPARIN SODIUM 70 MG: 100 INJECTION SUBCUTANEOUS at 10:04

## 2017-04-10 RX ADMIN — SODIUM CHLORIDE 8 UNITS/HR: 9 INJECTION, SOLUTION INTRAVENOUS at 05:04

## 2017-04-10 RX ADMIN — DEXTROSE MONOHYDRATE, SODIUM CHLORIDE, AND POTASSIUM CHLORIDE: 50; 4.5; 1.49 INJECTION, SOLUTION INTRAVENOUS at 01:04

## 2017-04-10 RX ADMIN — Medication 3 ML: at 10:04

## 2017-04-10 NOTE — PLAN OF CARE
Problem: Patient Care Overview  Goal: Plan of Care Review  Outcome: Ongoing (interventions implemented as appropriate)  Reviewed care plan with patient, understanding verbalized. AAO during shift. VS stable on RA. Pain managed with pca dilaudid, denies nausea overnight. Maintained NPO overnight with ice chips. Patient up with assist, voids spontaneously per bedside commode. No BM overnight. Patient free of falls, traumas and injuries. Blood glucose Q1H obtained. Titrated per algorithm. No acute event overnight. WCTM

## 2017-04-10 NOTE — PLAN OF CARE
Problem: Physical Therapy Goal  Goal: Physical Therapy Goal  Goals to be met by: 2017     Patient will increase functional independence with mobility by performin. Supine to sit with Stand-by Assistance  2. Sit to supine with Stand-by Assistance  3. Bed to chair transfer with Modified [place] using Rolling Walker  4. Gait x 150 feet with [place] using no assistive device.    Outcome: Ongoing (interventions implemented as appropriate)  Pt progressing toward goals.  POC and goals remain appropriate.

## 2017-04-10 NOTE — PLAN OF CARE
Problem: Patient Care Overview  Goal: Plan of Care Review  Outcome: Ongoing (interventions implemented as appropriate)  POC reviewed with pt, verbalized understanding. Pt AAOx4, VSS. Pt up with assistance, ambulated with PT in halls today. Pt NPO except ice chips. TPN, IVFs, and insulin gtt infusing, accuchecks q1h. Pt c/o abdominal pain; using PCA as needed. G tube clamped, pt denies n/v. Pt remains free of any falls/injury. Call light within reach, family at bedside. Will continue to monitor.

## 2017-04-10 NOTE — PT/OT/SLP PROGRESS
"Physical Therapy  Treatment    Anayeli Judd   MRN: [id number]   Admitting Diagnosis: SBO (small bowel obstruction) s/p ex-lap    PT Received On: 04/10/17  PT Start Time: 1511     PT Stop Time: 1544    PT Total Time (min): 33 min       Billable Minutes:  Gait [id number] and Therapeutic Exercise 8    Treatment Type: Treatment  PT/PTA: PT     PTA Visit Number: 1       General Precautions: Standard, fall  Orthopedic Precautions: N/A   Braces:           Subjective:  Communicated with RN prior to session.  "I have been getting up every hour to use the bathroom.  I am getting a lot of practice, but I am tired. "    Objective:   Patient found with: peripheral IV, PCA  Patient found sitting up in chair with visitors in room. She agreed to therapy session.  Patient performed toileting with set up assistance to begin session.  She requires assistance to manage IV pole and multiple lines, but is otherwise modified independent with Rw for transfer.  Pt ambulated 100 feet with RW, modified independent with therapist managing IV pole.  Patient performed sitting LE exercise.  Therapist encouraged patient to ambulate as far as she can.  Patient able to ambulate 300 feet modified independent with RW.       Functional Mobility:  Transfers:  Sit <> Stand Assistance: Stand By Assistance  Toilet Transfer Assistance: Stand By Assistance    Gait:   Gait Distance: Pt ambulated 100 feet modified indendent with RW with therapist managing IV pole.  After seated rest break she ambulated 300 feet modified independent with RW.     Therapeutic Activities and Exercises:  Sitting LE exercises to progress functional mobility:  · Ankle pumps  · Marching  · LAQ  · Hip ADD  · Glut sets     AM-PAC 6 CLICK MOBILITY  How much help from another person does this patient currently need?   1 = Unable, Total/Dependent Assistance  2 = A lot, Maximum/Moderate Assistance  3 = A little, Minimum/Contact Guard/Supervision  4 = None, Modified " [place]/Independent    Turning over in bed (including adjusting bedclothes, sheets and blankets)?: 3  Sitting down on and standing up from a chair with arms (e.g., wheelchair, bedside commode, etc.): 4  Moving from lying on back to sitting on the side of the bed?: 3  Moving to and from a bed to a chair (including a wheelchair)?: 3  Need to walk in hospital room?: 3  Climbing 3-5 steps with a railing?: 3  Total Score: 19    AM-PAC Raw Score CMS G-Code Modifier Level of Impairment Assistance   6 % Total / Unable   7 - 9 CM 80 - 100% Maximal Assist   10 - 14 CL 60 - 80% Moderate Assist   15 - 19 CK 40 - 60% Moderate Assist   20 - 22 CJ 20 - 40% Minimal Assist   23 CI 1-20% SBA / CGA   24 CH 0% Independent/ Mod I     Patient left up in chair with all lines intact, call button in reach and family present.    Assessment:  Anayeli Judd is a 72 y.o. female with a medical diagnosis of SBO (small bowel obstruction) and presents with slightly decreased functional mobility.  Patient requires assistance to manage IV pole, but is otherwise modified independent with RW.  Prior to admit she did not use an assistive device for household mobility.  She has the potential to progress to independence without an assistive device with additional therapy.  Therapist instructed patient in independent walking program with RW between therapy sessions. POC decreased to 3x/week with patient performing ambulation with family or nsg assist between therapy.      Rehab identified problem list/impairments: Rehab identified problem list/impairments: pain, impaired skin, impaired functional mobilty    Rehab potential is excellent.    Activity tolerance: Good    Discharge recommendations: Discharge Facility/Level Of Care Needs: home with home health     Barriers to discharge: Barriers to Discharge: None    Equipment recommendations: Equipment Needed After Discharge: none     GOALS:   Physical Therapy Goals        Problem: Physical  Therapy Goal    Goal Priority Disciplines Outcome Goal Variances Interventions   Physical Therapy Goal     PT/OT, PT Ongoing (interventions implemented as appropriate)     Description:  Goals to be met by: 2017     Patient will increase functional independence with mobility by performin. Supine to sit with Stand-by Assistance  2. Sit to supine with Stand-by Assistance  3. Bed to chair transfer with Modified [place] using Rolling Walker  4. Gait  x 150 feet with [place] using no assistive device.                 PLAN:    Patient to be seen 3 x/week  to address the above listed problems via gait training, therapeutic activities, therapeutic exercises  Plan of Care expires: 17  Plan of Care reviewed with: patient, daughter         Ritika VÁSQUEZ Cedrick, PT  04/10/2017

## 2017-04-10 NOTE — PROGRESS NOTES
[facility]  General Surgery  Progress Note    Subjective:     Interval History:   Patient seen and examined this AM. G tube clamped, tolerated it well with no N/V. No BM. Pain well managed with PCA. TPN infusing.  On therapeutic lovenox for right arm DVT. Slightly decreased erythema on RUE from DVT.    Post-Op Info:  Procedure(s) (LRB):  EXPLORATORY-LAPAROTOMY (N/A)  REPAIR  LYSIS-ADHESION (N/A)  PLACEMENT   9 Days Post-Op      Medications:  Continuous Infusions:   Amino acid 5% - dextrose 15% (CLINIMIX-E) solution with additives (1L  provides 510 kcal/L dextrose, with 50 gm AA, 150 gm CHO, Na 35, K 30, Mg 5, Ca 4.5, Acetate 80, Cl 39, Phos 15) 60 mL/hr at 04/09/17 2251    dextrose 5 % and 0.45 % NaCl with KCl 20 mEq 100 mL/hr at 04/09/17 2308    hydromorphone in 0.9 % NaCl 6 mg/30 ml      insulin (HUMAN R) infusion (adults) 6.2 Units/hr (04/10/17 0816)     Scheduled Meds:   clindamycin (CLEOCIN) IVPB  900 mg Intravenous Q8H    enoxaparin  1 mg/kg Subcutaneous Q12H    fat emulsion 20%  250 mL Intravenous Daily    Lactobacillus rhamnosus GG  1 capsule Oral Daily    pantoprazole  40 mg Intravenous Daily    sodium chloride 0.9%  10 mL Intravenous Q6H    sodium chloride 0.9%  3 mL Intravenous Q8H     PRN Meds:cyclobenzaprine, dextrose 50%, dextrose 50%, dextrose 50%, diphenhydrAMINE, glucagon (human recombinant), insulin aspart, naloxone, ondansetron, promethazine (PHENERGAN) IVPB, Flushing PICC Protocol **AND** sodium chloride 0.9% **AND** sodium chloride 0.9%     Objective:     Vital Signs (Most Recent):  Temp: 98.6 °F (37 °C) (04/10/17 0749)  Pulse: 85 (04/10/17 0749)  Resp: 18 (04/10/17 0749)  BP: 130/60 (04/10/17 0749)  SpO2: 95 % (04/10/17 0749) Vital Signs (24h Range):  Temp:  [97.8 °F (36.6 °C)-98.6 °F (37 °C)] 98.6 °F (37 °C)  Pulse:  [75-91] 85  Resp:  [16-20] 18  SpO2:  [95 %-99 %] 95 %  BP: (116-130)/(56-60) 130/60       Intake/Output Summary (Last 24 hours) at 04/10/17  0829  Last data filed at 04/10/17 0720   Gross per 24 hour   Intake          4392.15 ml   Output             5235 ml   Net          -842.85 ml       Physical Exam   Constitutional: She appears well-developed and well-nourished. No distress.   HENT:   Head: Normocephalic and atraumatic.   Cardiovascular: Normal rate and regular rhythm.    Pulmonary/Chest: Effort normal. No respiratory distress.   Abdominal:   Soft, appropriate TTP, incision - staples in place, clean dry and intact; incision opened at superior aspect, repacked with gauze this AM; decreased erythema   G-tube in place clamped  Right arm swelling and erythema worsening but no fluctuance or induration       Significant Labs:  BMP:     Recent Labs  Lab 04/10/17  0526   *   *   K 4.4      CO2 23   BUN 8   CREATININE 0.7   CALCIUM 8.3*   MG 1.2*     CBC:     Recent Labs  Lab 04/10/17  0526   WBC 7.99   RBC 2.57*   HGB 7.8*   HCT 23.6*      MCV 92   MCH 30.4   MCHC 33.1     CMP:     Recent Labs  Lab 04/10/17  0526   *   CALCIUM 8.3*   ALBUMIN 1.7*   PROT 5.4*   *   K 4.4   CO2 23      BUN 8   CREATININE 0.7   ALKPHOS 56   ALT 13   AST 16   BILITOT 0.5     LFTs:     Recent Labs  Lab 04/10/17  0526   ALT 13   AST 16   ALKPHOS 56   BILITOT 0.5   PROT 5.4*   ALBUMIN 1.7*     All pertinent labs from the last 24 hours have been reviewed.    Assessment/Plan:   71 yo female s/p ex lap with TIFFANY and G-tube placement    S/p ex lap  -continue clamping G-tube, unclamp for nausea/vomiting or increased abdominal pain  -continue chips and sips  -mIVF  -continue TPN  -continue PCA  -nausea meds PRN  -continue clinda    DM  - home meds  - insulin gtt    Muscle spasms  -flexeril     Hypomagnesemia  -replace    Protein calorie malnutrition  -Prealbumin = 4  -starting TPN 4/6/17    Right arm brachial vein thrombosis  - therapeutic lovenox  - warm compresses  - no signs of fluid pocket amenable to drainage but will continue to  monitor    Prophylaxis  GI/DVT      Sanjuana Redding PA-C  [id number]

## 2017-04-11 PROBLEM — E68 SEQUELAE OF HYPERALIMENTATION: Status: ACTIVE | Noted: 2017-04-11

## 2017-04-11 LAB
ALBUMIN SERPL BCP-MCNC: 1.9 G/DL
ALP SERPL-CCNC: 66 U/L
ALT SERPL W/O P-5'-P-CCNC: 13 U/L
ANION GAP SERPL CALC-SCNC: 8 MMOL/L
AST SERPL-CCNC: 15 U/L
BASOPHILS # BLD AUTO: 0.02 K/UL
BASOPHILS NFR BLD: 0.2 %
BILIRUB SERPL-MCNC: 0.6 MG/DL
BUN SERPL-MCNC: 10 MG/DL
CALCIUM SERPL-MCNC: 8.7 MG/DL
CHLORIDE SERPL-SCNC: 101 MMOL/L
CO2 SERPL-SCNC: 24 MMOL/L
CREAT SERPL-MCNC: 0.7 MG/DL
DIFFERENTIAL METHOD: ABNORMAL
EOSINOPHIL # BLD AUTO: 0.3 K/UL
EOSINOPHIL NFR BLD: 3.6 %
ERYTHROCYTE [DISTWIDTH] IN BLOOD BY AUTOMATED COUNT: 14.3 %
EST. GFR  (AFRICAN AMERICAN): >60 ML/MIN/1.73 M^2
EST. GFR  (NON AFRICAN AMERICAN): >60 ML/MIN/1.73 M^2
GLUCOSE SERPL-MCNC: 226 MG/DL
HCT VFR BLD AUTO: 26.6 %
HGB BLD-MCNC: 8.7 G/DL
LYMPHOCYTES # BLD AUTO: 1.9 K/UL
LYMPHOCYTES NFR BLD: 21.9 %
MAGNESIUM SERPL-MCNC: 1.6 MG/DL
MCH RBC QN AUTO: 30.5 PG
MCHC RBC AUTO-ENTMCNC: 32.7 %
MCV RBC AUTO: 93 FL
MONOCYTES # BLD AUTO: 0.8 K/UL
MONOCYTES NFR BLD: 8.7 %
NEUTROPHILS # BLD AUTO: 5.7 K/UL
NEUTROPHILS NFR BLD: 64.5 %
PHOSPHATE SERPL-MCNC: 6.3 MG/DL
PLATELET # BLD AUTO: 233 K/UL
PMV BLD AUTO: 11.5 FL
POCT GLUCOSE: 131 MG/DL (ref 70–110)
POCT GLUCOSE: 155 MG/DL (ref 70–110)
POCT GLUCOSE: 159 MG/DL (ref 70–110)
POCT GLUCOSE: 172 MG/DL (ref 70–110)
POCT GLUCOSE: 180 MG/DL (ref 70–110)
POCT GLUCOSE: 187 MG/DL (ref 70–110)
POCT GLUCOSE: 215 MG/DL (ref 70–110)
POCT GLUCOSE: 224 MG/DL (ref 70–110)
POCT GLUCOSE: 226 MG/DL (ref 70–110)
POCT GLUCOSE: 232 MG/DL (ref 70–110)
POCT GLUCOSE: 234 MG/DL (ref 70–110)
POCT GLUCOSE: 251 MG/DL (ref 70–110)
POCT GLUCOSE: 253 MG/DL (ref 70–110)
POCT GLUCOSE: 258 MG/DL (ref 70–110)
POCT GLUCOSE: 260 MG/DL (ref 70–110)
POCT GLUCOSE: 266 MG/DL (ref 70–110)
POCT GLUCOSE: 271 MG/DL (ref 70–110)
POCT GLUCOSE: 280 MG/DL (ref 70–110)
POCT GLUCOSE: 288 MG/DL (ref 70–110)
POTASSIUM SERPL-SCNC: 4.2 MMOL/L
PROT SERPL-MCNC: 6.2 G/DL
RBC # BLD AUTO: 2.85 M/UL
SODIUM SERPL-SCNC: 133 MMOL/L
WBC # BLD AUTO: 8.81 K/UL

## 2017-04-11 PROCEDURE — 94799 UNLISTED PULMONARY SVC/PX: CPT

## 2017-04-11 PROCEDURE — 80053 COMPREHEN METABOLIC PANEL: CPT

## 2017-04-11 PROCEDURE — C9113 INJ PANTOPRAZOLE SODIUM, VIA: HCPCS | Performed by: SURGERY

## 2017-04-11 PROCEDURE — 83735 ASSAY OF MAGNESIUM: CPT

## 2017-04-11 PROCEDURE — 25000003 PHARM REV CODE 250: Performed by: PHYSICIAN ASSISTANT

## 2017-04-11 PROCEDURE — 97530 THERAPEUTIC ACTIVITIES: CPT

## 2017-04-11 PROCEDURE — 25000003 PHARM REV CODE 250: Performed by: SURGERY

## 2017-04-11 PROCEDURE — 97535 SELF CARE MNGMENT TRAINING: CPT

## 2017-04-11 PROCEDURE — 85025 COMPLETE CBC W/AUTO DIFF WBC: CPT

## 2017-04-11 PROCEDURE — 63600175 PHARM REV CODE 636 W HCPCS: Performed by: SURGERY

## 2017-04-11 PROCEDURE — 99223 1ST HOSP IP/OBS HIGH 75: CPT | Mod: ,,, | Performed by: NURSE PRACTITIONER

## 2017-04-11 PROCEDURE — 63600175 PHARM REV CODE 636 W HCPCS: Performed by: NURSE PRACTITIONER

## 2017-04-11 PROCEDURE — 97803 MED NUTRITION INDIV SUBSEQ: CPT

## 2017-04-11 PROCEDURE — 25000003 PHARM REV CODE 250: Performed by: NURSE PRACTITIONER

## 2017-04-11 PROCEDURE — 94664 DEMO&/EVAL PT USE INHALER: CPT

## 2017-04-11 PROCEDURE — 20600001 HC STEP DOWN PRIVATE ROOM

## 2017-04-11 PROCEDURE — 63600175 PHARM REV CODE 636 W HCPCS: Performed by: PHYSICIAN ASSISTANT

## 2017-04-11 PROCEDURE — 84100 ASSAY OF PHOSPHORUS: CPT

## 2017-04-11 RX ORDER — PSEUDOEPHEDRINE/ACETAMINOPHEN 30MG-500MG
100 TABLET ORAL
Status: COMPLETED | OUTPATIENT
Start: 2017-04-11 | End: 2017-04-11

## 2017-04-11 RX ORDER — IBUPROFEN 200 MG
16 TABLET ORAL
Status: DISCONTINUED | OUTPATIENT
Start: 2017-04-11 | End: 2017-04-15

## 2017-04-11 RX ORDER — MAGNESIUM SULFATE HEPTAHYDRATE 40 MG/ML
2 INJECTION, SOLUTION INTRAVENOUS ONCE
Status: COMPLETED | OUTPATIENT
Start: 2017-04-11 | End: 2017-04-11

## 2017-04-11 RX ORDER — GLUCAGON 1 MG
1 KIT INJECTION
Status: DISCONTINUED | OUTPATIENT
Start: 2017-04-11 | End: 2017-04-15

## 2017-04-11 RX ORDER — SYRING-NEEDL,DISP,INSUL,0.3 ML 29 G X1/2"
300 SYRINGE, EMPTY DISPOSABLE MISCELLANEOUS
Status: COMPLETED | OUTPATIENT
Start: 2017-04-11 | End: 2017-04-11

## 2017-04-11 RX ORDER — IBUPROFEN 200 MG
24 TABLET ORAL
Status: DISCONTINUED | OUTPATIENT
Start: 2017-04-11 | End: 2017-04-15

## 2017-04-11 RX ORDER — INSULIN ASPART 100 [IU]/ML
0-4 INJECTION, SOLUTION INTRAVENOUS; SUBCUTANEOUS
Status: DISCONTINUED | OUTPATIENT
Start: 2017-04-11 | End: 2017-04-12

## 2017-04-11 RX ADMIN — ENOXAPARIN SODIUM 70 MG: 100 INJECTION SUBCUTANEOUS at 11:04

## 2017-04-11 RX ADMIN — I.V. FAT EMULSION 250 ML: 20 EMULSION INTRAVENOUS at 10:04

## 2017-04-11 RX ADMIN — Medication 1 CAPSULE: at 08:04

## 2017-04-11 RX ADMIN — Medication 10 ML: at 12:04

## 2017-04-11 RX ADMIN — DEXTROSE MONOHYDRATE, SODIUM CHLORIDE, AND POTASSIUM CHLORIDE: 50; 4.5; 1.49 INJECTION, SOLUTION INTRAVENOUS at 02:04

## 2017-04-11 RX ADMIN — CLINDAMYCIN IN 5 PERCENT DEXTROSE 900 MG: 18 INJECTION, SOLUTION INTRAVENOUS at 02:04

## 2017-04-11 RX ADMIN — Medication 3 ML: at 10:04

## 2017-04-11 RX ADMIN — Medication 100 ML: at 12:04

## 2017-04-11 RX ADMIN — ENOXAPARIN SODIUM 70 MG: 100 INJECTION SUBCUTANEOUS at 12:04

## 2017-04-11 RX ADMIN — Medication 3 ML: at 02:04

## 2017-04-11 RX ADMIN — MAGNESIUM SULFATE IN WATER 2 G: 40 INJECTION, SOLUTION INTRAVENOUS at 08:04

## 2017-04-11 RX ADMIN — Medication: at 08:04

## 2017-04-11 RX ADMIN — MAGESIUM CITRATE 300 ML: 1.75 LIQUID ORAL at 12:04

## 2017-04-11 RX ADMIN — PANTOPRAZOLE SODIUM 40 MG: 40 INJECTION, POWDER, FOR SOLUTION INTRAVENOUS at 08:04

## 2017-04-11 RX ADMIN — CLINDAMYCIN IN 5 PERCENT DEXTROSE 900 MG: 18 INJECTION, SOLUTION INTRAVENOUS at 05:04

## 2017-04-11 RX ADMIN — Medication 10 ML: at 11:04

## 2017-04-11 RX ADMIN — ENOXAPARIN SODIUM 70 MG: 100 INJECTION SUBCUTANEOUS at 10:04

## 2017-04-11 RX ADMIN — ASCORBIC ACID, VITAMIN A PALMITATE, CHOLECALCIFEROL, THIAMINE HYDROCHLORIDE, RIBOFLAVIN-5 PHOSPHATE SODIUM, PYRIDOXINE HYDROCHLORIDE, NIACINAMIDE, DEXPANTHENOL, ALPHA-TOCOPHEROL ACETATE, VITAMIN K1, FOLIC ACID, BIOTIN, CYANOCOBALAMIN: 200; 3300; 200; 6; 3.6; 6; 40; 15; 10; 150; 600; 60; 5 INJECTION, SOLUTION INTRAVENOUS at 11:04

## 2017-04-11 RX ADMIN — SODIUM CHLORIDE 2.5 UNITS/HR: 9 INJECTION, SOLUTION INTRAVENOUS at 05:04

## 2017-04-11 RX ADMIN — Medication 10 ML: at 05:04

## 2017-04-11 RX ADMIN — Medication: at 12:04

## 2017-04-11 RX ADMIN — SODIUM CHLORIDE 500 ML: 0.9 INJECTION, SOLUTION INTRAVENOUS at 12:04

## 2017-04-11 RX ADMIN — DEXTROSE MONOHYDRATE, SODIUM CHLORIDE, AND POTASSIUM CHLORIDE: 50; 4.5; 1.49 INJECTION, SOLUTION INTRAVENOUS at 11:04

## 2017-04-11 RX ADMIN — ASCORBIC ACID, VITAMIN A PALMITATE, CHOLECALCIFEROL, THIAMINE HYDROCHLORIDE, RIBOFLAVIN-5 PHOSPHATE SODIUM, PYRIDOXINE HYDROCHLORIDE, NIACINAMIDE, DEXPANTHENOL, ALPHA-TOCOPHEROL ACETATE, VITAMIN K1, FOLIC ACID, BIOTIN, CYANOCOBALAMIN: 200; 3300; 200; 6; 3.6; 6; 40; 15; 10; 150; 600; 60; 5 INJECTION, SOLUTION INTRAVENOUS at 12:04

## 2017-04-11 RX ADMIN — CLINDAMYCIN IN 5 PERCENT DEXTROSE 900 MG: 18 INJECTION, SOLUTION INTRAVENOUS at 11:04

## 2017-04-11 RX ADMIN — INSULIN ASPART 2 UNITS: 100 INJECTION, SOLUTION INTRAVENOUS; SUBCUTANEOUS at 08:04

## 2017-04-11 NOTE — ASSESSMENT & PLAN NOTE
Lab Results   Component Value Date    TSH 0.319 (L) 02/06/2017       Home dose of synthroid 88 mcg PO daily. Recheck as outpatient

## 2017-04-11 NOTE — PLAN OF CARE
CM/SW met w/pt and dgr (Kimberly) to discuss dc planning.  Kimberly has requested SNF placement for pt as pt is unable to get out of bed  due to abdominal pain.  SW has requested SSC, Debbie , to make referrals to the following facilities: [facility] and [facility].  Dgr was unable to provide SW w/a 3rd choice as these two were the ones she preferred.    Marck Zhao, OPAL  [id number]

## 2017-04-11 NOTE — PLAN OF CARE
CM verified yesterdays' PT note recs: HH. CM called assigned OT, Purvi, & informed of patients' daughters' request for SNF w/yesterdays' PT recs for HH. She states she is getting ready to go see her again;she didn't want to do therapy earlier. Informed her to encouraged her to participate for insurance companies use their notes to determine eligibility & approval. She verbalized her understanding.     CM called daughterKimberly, & informed of above. She verbalized her understanding. CM would get w/her tomorrow & talk about discharge plan pending OT recs today.

## 2017-04-11 NOTE — ASSESSMENT & PLAN NOTE
BG goal 140-180 while hospitalized. BG improving, continue intensive insulin infusion protocol qith q1h BG until stable, then can change to q2h. Goal to change to transition insulin infusion with q4h BG to cover TPN BG needs by tonight to relieve patient of frequent BG checks overnight    DISCHARGE PLANNING - need discuss with pt A1c in DM range since 11/2016  Continue Metformin home dose (as long as kidney and liver function WNL on discharge)  Has Metformin and BG meter and supplies at home

## 2017-04-11 NOTE — PLAN OF CARE
Problem: Patient Care Overview  Goal: Plan of Care Review  Outcome: Ongoing (interventions implemented as appropriate)  POC reviewed with pt, verbalized understanding. Pt AAOx4, VSS. Pt up with assistance, ambulating with walker. Pt NPO except ice chips. TPN, IVFs, and insulin gtt infusing, accuchecks q2h. Pt c/o abdominal pain; using PCA as needed. G tube clamped, pt denies n/v. Pt had BMx1, voiding per bedside commode. Pt remains free of any falls/injury. Call light within reach. Will continue to monitor.

## 2017-04-11 NOTE — PROGRESS NOTES
[facility]  General Surgery  Progress Note    Subjective:     Interval History:   Patient seen and examined this AM. G tube clamped, tolerated it well with no N/V. Denies F/BM. Pain well managed with PCA. TPN infusing.  On therapeutic lovenox for right arm DVT. RUE erythema improving    Post-Op Info:  Procedure(s) (LRB):  EXPLORATORY-LAPAROTOMY (N/A)  REPAIR  LYSIS-ADHESION (N/A)  PLACEMENT   10 Days Post-Op      Medications:  Continuous Infusions:   Amino acid 5% - dextrose 15% (CLINIMIX-E) solution with additives (1L  provides 510 kcal/L dextrose, with 50 gm AA, 150 gm CHO, Na 35, K 30, Mg 5, Ca 4.5, Acetate 80, Cl 39, Phos 15) 60 mL/hr at 04/11/17 0023    dextrose 5 % and 0.45 % NaCl with KCl 20 mEq 75 mL/hr at 04/11/17 0230    hydromorphone in 0.9 % NaCl 6 mg/30 ml      insulin (HUMAN R) infusion (adults) 9.6 Units/hr (04/11/17 0716)     Scheduled Meds:   clindamycin (CLEOCIN) IVPB  900 mg Intravenous Q8H    enoxaparin  1 mg/kg Subcutaneous Q12H    fat emulsion 20%  250 mL Intravenous Daily    Lactobacillus rhamnosus GG  1 capsule Oral Daily    pantoprazole  40 mg Intravenous Daily    sodium chloride 0.9%  10 mL Intravenous Q6H    sodium chloride 0.9%  3 mL Intravenous Q8H     PRN Meds:cyclobenzaprine, dextrose 50%, dextrose 50%, dextrose 50%, diphenhydrAMINE, glucagon (human recombinant), insulin aspart, naloxone, ondansetron, promethazine (PHENERGAN) IVPB, Flushing PICC Protocol **AND** sodium chloride 0.9% **AND** sodium chloride 0.9%     Objective:     Vital Signs (Most Recent):  Temp: 97.1 °F (36.2 °C) (04/11/17 0712)  Pulse: 79 (04/11/17 0712)  Resp: 18 (04/11/17 0712)  BP: (!) 130/58 (04/11/17 0712)  SpO2: (!) 94 % (04/11/17 0712) Vital Signs (24h Range):  Temp:  [97.1 °F (36.2 °C)-98.6 °F (37 °C)] 97.1 °F (36.2 °C)  Pulse:  [79-87] 79  Resp:  [16-18] 18  SpO2:  [94 %-97 %] 94 %  BP: (117-133)/(56-61) 130/58       Intake/Output Summary (Last 24 hours) at 04/11/17 7334  Last  data filed at 04/10/17 1911   Gross per 24 hour   Intake               20 ml   Output             1090 ml   Net            -1070 ml       Physical Exam   Constitutional: She appears well-developed and well-nourished. No distress.   HENT:   Head: Normocephalic and atraumatic.   Cardiovascular: Normal rate and regular rhythm.    Pulmonary/Chest: Effort normal. No respiratory distress.   Abdominal:   Soft, appropriate TTP, incision - staples in place, clean dry and intact; incision opened at superior aspect, repacked with gauze this AM; decreased erythema   G-tube in place clamped  Right arm swelling and erythema worsening but no fluctuance or induration       Significant Labs:  BMP:     Recent Labs  Lab 04/11/17  0440   *   *   K 4.2      CO2 24   BUN 10   CREATININE 0.7   CALCIUM 8.7   MG 1.6     CBC:     Recent Labs  Lab 04/11/17 0440   WBC 8.81   RBC 2.85*   HGB 8.7*   HCT 26.6*      MCV 93   MCH 30.5   MCHC 32.7     CMP:     Recent Labs  Lab 04/11/17  0440   *   CALCIUM 8.7   ALBUMIN 1.9*   PROT 6.2   *   K 4.2   CO2 24      BUN 10   CREATININE 0.7   ALKPHOS 66   ALT 13   AST 15   BILITOT 0.6     LFTs:     Recent Labs  Lab 04/11/17 0440   ALT 13   AST 15   ALKPHOS 66   BILITOT 0.6   PROT 6.2   ALBUMIN 1.9*     All pertinent labs from the last 24 hours have been reviewed.    Assessment/Plan:   71 yo female s/p ex lap with TIFFANY and G-tube placement    S/p ex lap  -continue clamping G-tube, unclamp for nausea/vomiting or increased abdominal pain  -continue chips and sips  -mIVF  -continue TPN  -continue PCA  -nausea meds PRN  -continue clinda  -will give enema today    DM  - home meds  - insulin gtt    Muscle spasms  -flexeril     Hypomagnesemia  -replace    Protein calorie malnutrition  -Prealbumin = 4  -starting TPN 4/6/17    Right arm brachial vein thrombosis  - therapeutic lovenox  - warm compresses  - no signs of fluid pocket amenable to drainage but will continue to  monitor    Hypomagnesemia  -replace    Prophylaxis  GI/DVT      Sanjuana Redding PA-C  [id number]

## 2017-04-11 NOTE — PLAN OF CARE
Ssc sent referral to :    [facility] [place] ( connected)   [facility] ( p [phone]) ( [phone])       Debbie/licha

## 2017-04-11 NOTE — PLAN OF CARE
"Visited patient. AAOX4. Daughter, Kimberly, at BS. Daughter states,"Dr. Fletcher told me my Mother would need SNF when she is discharged. I would like her to go to a SNF. Here are our choices: 1. [facility], or 2. [facility]." CM informed them discharge needs are determined by PT/OT recommendations w/last recommendation being for HH;CM would check w/Therapy & get back to her. She verbalized her understanding.   Not medically stable for discharge;POD # 10; waiting for bowel function to return. Being treated for R arm DVT & on Lovenox. CM asked COLUMBA Nunez, in this AM Huddle, for prescription for Lovenox to be processed, if she would be discharged home on this. COLUMBA Nunez, verbalized her understanding. EUNICE JOSEPH, following. CM will continue to follow.        04/11/17 1133   Right Care Assessment   Can the patient answer the patient profile reliably? Yes, cognitively intact   How often would a person be available to care for the patient? Often   Describe the patient's ability to walk at the present time. Walks with the help of equipment  (4/10 PT note denotes: moderate assist, w/recs for HH PT;4/11 OT note pending. )   How does the patient rate their overall health at the present time? Fair   Number of comorbid conditions (as recorded on the chart) Five or more   During the past month, has the patient often been bothered by feeling down, depressed or hopeless? No   During the past month, has the patient often been bothered by little interest or pleasure in doing things? No        "

## 2017-04-11 NOTE — PLAN OF CARE
[facility] [phone] spoke to Hollie states she received something that was about 100 and something pages in her e-mail but she can't open it up    Soni Solo connected.

## 2017-04-11 NOTE — PROGRESS NOTES
[facility]  Adult Nutrition  Progress Note    SUMMARY     Recommendations  Recommendation/Intervention:   1. Continue current TPN - providing > 85% EEN and EPN.   2. As medically appropriate, advance PO diet as tolerated.   RD to monitor.    Goals: Patient to receive > 85% EEN and EPN  Nutrition Goal Status: goal met  Communication of RD Recs: discussed on rounds    Reason for Assessment  Reason for Assessment: RD follow-up  Diagnosis:  (SBO)  Relevent Medical History: colon cancer s/p partial colectomy, SBO s/p SBR, DM, GERD, HLD   Interdisciplinary Rounds: attended   General Information Comments: POD#10 s/p ex lap, TIFFANY, and G-tube placement. Patient NPO on TPN. Nutrition D/C Planning: unable to determine at this time.    Nutrition Prescription Ordered  Current Diet Order: NPO   Current Nutrition Support Formula Ordered: Clinimax E 5/15 (+ lipids)  Current Nutrition Support Rate Ordered: 60 (ml)  Current Nutrition Support Frequency Ordered: mL/hr      Evaluation of Received Nutrients/Fluid Intake  Parenteral Calories (kcal): 1522  Parenteral Protein (gm): 72  Parenteral Fluid (mL): 1690  Energy Calories Required: meeting needs  % Kcal Needs: 102  Protein Required: meeting needs  % Protein Needs: 86   IV Fluid (mL): 1800   Fluid Required: exceed needs  Comments: GIR = 2.1 mg/kg/min  Tolerance: tolerating     Nutrition Risk Screen   Nutrition Risk Screen: reduced oral intake over the last month    Nutrition/Diet History  Patient Reported Diet/Restrictions/Preferences: general  Typical Food/Fluid Intake: Patient unable to tolerate PO intake for ~ 2 days PTA per MD note.   Food Preferences: No cultural or Taoist needs identified at this time.  Factors Affecting Nutritional Intake: NPO, abdominal pain, nausea/vomiting     Labs/Tests/Procedures/Meds   Pertinent Labs Reviewed: reviewed  Pertinent Labs Comments: Na 133, Glu 226, POCT Glu 180-232, HgbA1c 6.6, Phos 6.3, Alb 1.9  Pertinent Medications  Reviewed: reviewed  Pertinent Medications Comments: clindamycin, lactobacillus rhamnosus, pantoprazole, insulin drip, IVF    Physical Findings  Overall Physical Appearance:  (nourished)  Tubes: gastrostomy tube  Oral/Mouth Cavity: WDL  Skin:  (incision and drain)    Anthropometrics   Height (inches): 64.02 in  Weight Method: Stated  Weight (kg): 69.9 kg   Ideal Body Weight (IBW), Female: 120.1 lb   % Ideal Body Weight, Female (lb): 128.31 lb  BMI (kg/m2): 26.44  BMI Grade: 25 - 29.9 - overweight     Estimated/Assessed Needs  Weight Used For Calorie Calculations: 69.9 kg (154 lb 1.6 oz)   Height (cm): 162.6 cm   Energy Need Method: [place] Clair-St Jeor (1486 kcal/day (1.25))  RMR ([place] Clair-St. Jeor Equation): 1189.47   Weight Used For Protein Calculations: 69.9 kg (154 lb 1.6 oz)  Protein Requirements: 84-98 g/day (1.2-1.4 g/kg)  Fluid Need Method: RDA Method (1 mL/kcal or per MD)     Monitor and Evaluation  Food and Nutrient Intake: energy intake, parenteral nutrition intake  Food and Nutrient Adminstration: diet order, enteral and parenteral nutrition administration   Physical Activity and Function: nutrition-related ADLs and IADLs  Anthropometric Measurements: weight, weight change  Biochemical Data, Medical Tests and Procedures: electrolyte and renal panel, gastrointestinal profile, glucose/endocrine profile, lipid profile  Nutrition-Focused Physical Findings: overall appearance    Nutrition Risk  Level of Risk:  (1x/week)    Nutrition Follow-Up  RD Follow-up?: Yes    Nutrition Diagnosis  Problem: Altered GI function  Etiology: SBO  Signs/Symptoms: NPO and need for TPN  Nutrition Diagnosis Status: Continues

## 2017-04-11 NOTE — PT/OT/SLP PROGRESS
"Occupational Therapy  Treatment    Anayeli Judd   MRN: [id number]   Admitting Diagnosis: SBO (small bowel obstruction)    OT Date of Treatment: 17   OT Start Time: 1319  OT Stop Time: 1346  OT Total Time (min): 27 min    Billable Minutes:  Self Care/Home Management 17 and Therapeutic Activity 10    General Precautions: Standard, fall  Orthopedic Precautions:    Braces:           Subjective:  Communicated with RN prior to session.  "I had a rough night last night and the night before."  "They all left. Now It is just me and I have three kids. I can't help her ." "She cant put on her diaper if its wet. She needs help."- daughter     Pain Ratin/10  Location - Side: Bilateral  Location - Orientation: generalized  Location: abdomen  Pain Addressed: Reposition, Distraction, Nurse notified, Pre-medicate for activity  Pain Rating Post-Intervention: 1/10 (increased pain during LBD task but did not quantify; 1/10 post session pain level )    Objective:  Patient found with: peripheral IV, PCA, KOURTNEY drain     Functional Mobility:  Bed Mobility:  Scooting/Bridging: Modified Independent  Supine to Sit: Modified Independent, WIth side rail (HOB near flat ~ home)  Sit to Supine: Modified Independent, With side rail (HOB near flat ~home)    Transfers:   Sit <> Stand Assistance: Supervision (EOB)  Sit <> Stand Assistive Device: Rolling Walker  Toilet Transfer Technique: Stand Pivot  Toilet Transfer Assistance: Stand By Assistance  Toilet Transfer Assistive Device: Rolling Walker    Functional Ambulation: Pt performed functional mobility ~100ft with Sup and RW.      Activities of Daily Living:       UE Dressing Level of Assistance: Stand by assistance (don/doff gown like robe)    LE Dressing Level of Assistance: Moderate assistance (SBA doff/don LLE sock; Mod A doff/don RLE sock due to reported increased pain; pt later reported owning reacher and shoe horn for support)    Grooming Position: Standing at sink  Grooming " Level of Assistance: Supervision (brush hair x 2 trials; wash hands)  Toileting Where Assessed: Toilet  Toileting Level of Assistance: Stand by assistance       Therapeutic Activities and Exercises:  Pt and family educated on role of therapy and diffence in settings (SNF, , etc).   Pt educated on safety with daily tasks OOB, and importance of participating in daily ax. Pt whiteboard updated.      AM-PAC 6 CLICK ADL   How much help from another person does this patient currently need?   1 = Unable, Total/Dependent Assistance  2 = A lot, Maximum/Moderate Assistance  3 = A little, Minimum/Contact Guard/Supervision  4 = None, Modified Gosper/Independent    Putting on and taking off regular lower body clothing? : 2  Bathing (including washing, rinsing, drying)?: 2  Toileting, which includes using toilet, bedpan, or urinal? : 3  Putting on and taking off regular upper body clothing?: 3  Taking care of personal grooming such as brushing teeth?: 4  Eating meals?: 4  Total Score: 18     AM-PAC Raw Score CMS G-Code Modifier Level of Impairment Assistance   6 % Total / Unable   7 - 9 CM 80 - 100% Maximal Assist   10 - 14 CL 60 - 80% Moderate Assist   15 - 19 CK 40 - 60% Moderate Assist   20 - 22 CJ 20 - 40% Minimal Assist   23 CI 1-20% SBA / CGA   24 CH 0% Independent/ Mod I     Patient left supine with all lines intact, call button in reach, RN notified and MD present    ASSESSMENT:  Anayeli Judd is a 72 y.o. female with a medical diagnosis of SBO (small bowel obstruction). Pt tolerated session well and put forth good effort to participate. Pt presented with decreased (I), endurance, stability and safety for ADLs, self-care and functional mobility. Pt continues to progress and met 3 goals this date.  Pt will benefit from further OT in order to maximize (I) and safety for functional tasks.      Rehab identified problem list/impairments: Rehab identified problem list/impairments: weakness, impaired  functional mobilty, impaired endurance, gait instability, impaired balance, impaired self care skills, pain    Rehab potential is good.    Activity tolerance: Good    Discharge recommendations: Discharge Facility/Level Of Care Needs: home health PT, home health OT     Barriers to discharge: Barriers to Discharge: Decreased caregiver support (daughter reports family left and she is unable to remain with pt 24/7 due to three kids at home to care for)    Equipment recommendations: none     GOALS:   Occupational Therapy Goals        Problem: Occupational Therapy Goal    Goal Priority Disciplines Outcome Interventions   Occupational Therapy Goal     OT, PT/OT Ongoing (interventions implemented as appropriate)    Description:  Goals to be met by: 5/1/2017    Patient will increase functional independence with ADLs by performing:    UE Dressing with Stand-by Assistance. Met 4/11  LE Dressing with Moderate Assistance. Met 4/11  Updated: LE Dressing with supervision.   Grooming while standing with Set-up Assistance. Met 4/11  Toileting from toilet with Minimal Assistance for hygiene and clothing management. MET 4/6/17  Bathing from  seated with Minimal Assistance.  Toilet transfer to toilet with Supervision.                  Plan:  Patient to be seen 4 x/week to address the above listed problems via self-care/home management, community/work re-entry, therapeutic activities, therapeutic exercises  Plan of Care expires: 05/02/17  Plan of Care reviewed with: patient, daughter         Purvi JASMINE AGATA Lentz  04/11/2017

## 2017-04-11 NOTE — PROGRESS NOTES
Notified OSORIO Phipps of pt's . Ordered to follow insulin protocol and to recheck at 1200. Will continue to monitor.

## 2017-04-11 NOTE — PLAN OF CARE
Problem: Occupational Therapy Goal  Goal: Occupational Therapy Goal  Goals to be met by: 5/1/2017    Patient will increase functional independence with ADLs by performing:    UE Dressing with Stand-by Assistance. Met 4/11  LE Dressing with Moderate Assistance. Met 4/11  Updated: LE Dressing with supervision.   Grooming while standing with Set-up Assistance. Met 4/11  Toileting from toilet with Minimal Assistance for hygiene and clothing management. MET 4/6/17  Bathing from seated with Minimal Assistance.  Toilet transfer to toilet with Supervision.   3 goals met this session.  AGATA Velez  4/11/2017

## 2017-04-11 NOTE — CONSULTS
"[facility]  Endocrinology  Diabetes Consult Note    Consult Requested by: Herman Fletcher MD   Reason for admit: SBO (small bowel obstruction)    HISTORY OF PRESENT ILLNESS:  Reason for Consult: Management of T2DM, Hyperglycemia     Surgical Procedure and Date: n/a    Diabetes diagnosis year: PreDM for a "few years" per family report     Home Diabetes Medications:  Metformin 750 mg BID     How often checking glucose at home? One   BG readings on regimen: <140  Hypoglycemia on the regimen?  No  Missed doses on regimen?  No      HPI:   Patient is a 72 y.o. female with a diagnosis of Pre-DM per daugther's report. However, A1c upon admission 6.6% and has been in DM range since 11/2016. Followed by PCP.    Chronic medical conditions include: remote colon CA (s/p colon resection), SBO 2/2 adhesions, T2DM, GERD, fatty liver, HLD, and  hypothyroidism.     Patient presented to ED with abdominal discomfort. Admitted for treatment of SBO. Endocrine consulted for BG management.       Interval HPI:   Overnight events: BG elevated post TPN start. BG readings remain elevated but trending down with start of insulin infusion.   Eating:   NPO  Nausea: No  Hypoglycemia and intervention: No  Fever: No  TPN and/or TF: Yes  If yes, type of TF/TPN and rate: Clinimix (15%) @ 60 ml/hr    PMH, PSH, FH, SH updated and reviewed       Review of Systems   Constitutional: Positive for fatigue. Negative for activity change, appetite change and fever.   HENT: Negative for congestion, hearing loss and sore throat.    Respiratory: Negative for cough, chest tightness and shortness of breath.    Cardiovascular: Negative for chest pain and leg swelling.   Gastrointestinal: Negative for abdominal distention, nausea and vomiting.   Genitourinary: Negative for difficulty urinating.   Neurological: Negative for dizziness and headaches.   Psychiatric/Behavioral: Negative for agitation.       Current Medications and/or Treatments " Impacting Glycemic Control  Immunotherapy:    Immunosuppressants     None        Steroids:   Hormones     None        Pressors:    Autonomic Drugs     None        Hyperglycemia/Diabetes Medications:   Antihyperglycemics     Start     Stop Route Frequency Ordered    04/09/17 1200  insulin regular (Humulin R) 100 Units in sodium chloride 0.9% 100 mL infusion      -- IV Continuous 04/09/17 1052             PHYSICAL EXAMINATION:  Vitals:    04/11/17 0712   BP: (!) 130/58   Pulse: 79   Resp: 18   Temp: 97.1 °F (36.2 °C)     Body mass index is 26.43 kg/(m^2).    Physical Exam     HENT:   External ears, nose: no masses. No significant findings.   Hearing: normal     Neck:  No trachial deviation present, No neck masses noticed   Thyroid:  No thyromegaly present.  No thyroid tenderness.      Cardiovascular:    Auscultation:  No murmurs or abnormal sounds   Lower extremities:  No edema or cyanosis.     Respiratory:    Effort:  Normal, no use of accessory muscles.   Auscultation: breath sounds normal, no adventitious sounds.  Abdomen:     Exam:  Soft,  no masses.      No hernia noted.  Skin:    Inspection: no rashes, lesion or ulcers, + acanthosis nigracans.   Palpation: no induration or nodules.   Psychiatric:  Judgement and insight good with normal mood and affect.  Musculoskeletal:  Gait ANGE. No gross abnormalities.  Normal vibratory response to 128 Hz tuning fork bilaterally.           Labs Reviewed and Include     Recent Labs  Lab 04/11/17  0440   *   CALCIUM 8.7   ALBUMIN 1.9*   PROT 6.2   *   K 4.2   CO2 24      BUN 10   CREATININE 0.7   ALKPHOS 66   ALT 13   AST 15   BILITOT 0.6     Lab Results   Component Value Date    WBC 8.81 04/11/2017    HGB 8.7 (L) 04/11/2017    HCT 26.6 (L) 04/11/2017    MCV 93 04/11/2017     04/11/2017     No results for input(s): TSH, FREET4 in the last 168 hours.  Lab Results   Component Value Date    HGBA1C 6.6 (H) 03/30/2017       Nutritional status:   Body mass  index is 26.43 kg/(m^2).  Lab Results   Component Value Date    ALBUMIN 1.9 (L) 04/11/2017    ALBUMIN 1.7 (L) 04/10/2017    ALBUMIN 1.6 (L) 04/09/2017     Lab Results   Component Value Date    PREALBUMIN 7 (L) 04/10/2017    PREALBUMIN 4 (L) 04/06/2017    PREALBUMIN 4 (L) 04/04/2017       Estimated Creatinine Clearance: 67.7 mL/min (based on Cr of 0.7).    Accu-Checks  Recent Labs      04/10/17   2324  04/11/17   0025  04/11/17   0116  04/11/17   0224  04/11/17   0316  04/11/17   0424  04/11/17   0526  04/11/17   0622  04/11/17   0714  04/11/17   0809   POCTGLUCOSE  234*  266*  280*  288*  226*  271*  258*  232*  215*  180*        ASSESSMENT and PLAN    Type 2 diabetes mellitus without complication, without long-term current use of insulin  BG goal 140-180 while hospitalized. BG improving, continue intensive insulin infusion protocol qith q1h BG until stable, then can change to q2h. Goal to change to transition insulin infusion with q4h BG to cover TPN BG needs by tonight to relieve patient of frequent BG checks overnight    DISCHARGE PLANNING - need discuss with pt A1c in DM range since 11/2016  Continue Metformin home dose (as long as kidney and liver function WNL on discharge)  Has Metformin and BG meter and supplies at home     * SBO (small bowel obstruction)  Per surgery, NPO on TPN      History of colon cancer  Noted       Hypothyroid  Lab Results   Component Value Date    TSH 0.319 (L) 02/06/2017       Home dose of synthroid 88 mcg PO daily. Recheck as outpatient      Sequelae of hyperalimentation  On Clinimix (15%) @ 60 ml/hr, increasing insulin needs        Plan discussed with patient, family, and RN at bedside.     Bety Jimenez NP  Endocrinology  [facility]      ADDENDUM 1000: BG stable, insulin needs stable, continue IIP, change BG to q2h. Will change to transition insulin infusion @ 4 pm     ADDENDUM 1600: Change to transition insulin infusion @ 2.5 units/hr, BG q4h with low dose  correction scale

## 2017-04-11 NOTE — SUBJECTIVE & OBJECTIVE
Interval HPI:   Overnight events: BG elevated post TPN start. BG readings remain elevated but trending down with start of insulin infusion.   Eating:   NPO  Nausea: No  Hypoglycemia and intervention: No  Fever: No  TPN and/or TF: Yes  If yes, type of TF/TPN and rate: Clinimix (15%) @ 60 ml/hr    PMH, PSH, FH, SH updated and reviewed       Review of Systems   Constitutional: Positive for fatigue. Negative for activity change, appetite change and fever.   HENT: Negative for congestion, hearing loss and sore throat.    Respiratory: Negative for cough, chest tightness and shortness of breath.    Cardiovascular: Negative for chest pain and leg swelling.   Gastrointestinal: Negative for abdominal distention, nausea and vomiting.   Genitourinary: Negative for difficulty urinating.   Neurological: Negative for dizziness and headaches.   Psychiatric/Behavioral: Negative for agitation.       Current Medications and/or Treatments Impacting Glycemic Control  Immunotherapy:    Immunosuppressants     None        Steroids:   Hormones     None        Pressors:    Autonomic Drugs     None        Hyperglycemia/Diabetes Medications:   Antihyperglycemics     Start     Stop Route Frequency Ordered    04/09/17 1200  insulin regular (Humulin R) 100 Units in sodium chloride 0.9% 100 mL infusion      -- IV Continuous 04/09/17 1052             PHYSICAL EXAMINATION:  Vitals:    04/11/17 0712   BP: (!) 130/58   Pulse: 79   Resp: 18   Temp: 97.1 °F (36.2 °C)     Body mass index is 26.43 kg/(m^2).    Physical Exam     HENT:   External ears, nose: no masses. No significant findings.   Hearing: normal     Neck:  No trachial deviation present, No neck masses noticed   Thyroid:  No thyromegaly present.  No thyroid tenderness.      Cardiovascular:    Auscultation:  No murmurs or abnormal sounds   Lower extremities:  No edema or cyanosis.     Respiratory:    Effort:  Normal, no use of accessory muscles.   Auscultation: breath sounds normal, no  adventitious sounds.  Abdomen:     Exam:  Soft,  no masses.      No hernia noted.  Skin:    Inspection: no rashes, lesion or ulcers, + acanthosis nigracans.   Palpation: no induration or nodules.   Psychiatric:  Judgement and insight good with normal mood and affect.  Musculoskeletal:  Gait ANGE. No gross abnormalities.  Normal vibratory response to 128 Hz tuning fork bilaterally.

## 2017-04-11 NOTE — ASSESSMENT & PLAN NOTE
BG goal 140-180 while hospitalized.  BG globally elevated overnight.  BG monitoring q4h  Transition increase to 3 u/hr  Change to moderate dose correction scale 180/+2 Estimated Creatinine Clearance: 59.2 mL/min (based on Cr of 0.8).    DISCHARGE PLANNING - need discuss with pt A1c in DM range since 11/2016  Continue Metformin home dose (as long as kidney and liver function WNL on discharge)  Has Metformin and BG meter and supplies at home

## 2017-04-12 LAB
ALBUMIN SERPL BCP-MCNC: 1.9 G/DL
ALP SERPL-CCNC: 63 U/L
ALT SERPL W/O P-5'-P-CCNC: 12 U/L
ANION GAP SERPL CALC-SCNC: 6 MMOL/L
AST SERPL-CCNC: 17 U/L
BASOPHILS # BLD AUTO: 0.03 K/UL
BASOPHILS NFR BLD: 0.4 %
BILIRUB SERPL-MCNC: 0.5 MG/DL
BUN SERPL-MCNC: 10 MG/DL
CALCIUM SERPL-MCNC: 8.1 MG/DL
CHLORIDE SERPL-SCNC: 100 MMOL/L
CO2 SERPL-SCNC: 24 MMOL/L
CREAT SERPL-MCNC: 0.8 MG/DL
DIFFERENTIAL METHOD: ABNORMAL
EOSINOPHIL # BLD AUTO: 0.4 K/UL
EOSINOPHIL NFR BLD: 5 %
ERYTHROCYTE [DISTWIDTH] IN BLOOD BY AUTOMATED COUNT: 14.3 %
EST. GFR  (AFRICAN AMERICAN): >60 ML/MIN/1.73 M^2
EST. GFR  (NON AFRICAN AMERICAN): >60 ML/MIN/1.73 M^2
GLUCOSE SERPL-MCNC: 249 MG/DL
HCT VFR BLD AUTO: 24.7 %
HGB BLD-MCNC: 8 G/DL
LYMPHOCYTES # BLD AUTO: 1.7 K/UL
LYMPHOCYTES NFR BLD: 24.8 %
MAGNESIUM SERPL-MCNC: 1.5 MG/DL
MCH RBC QN AUTO: 30.2 PG
MCHC RBC AUTO-ENTMCNC: 32.4 %
MCV RBC AUTO: 93 FL
MONOCYTES # BLD AUTO: 0.6 K/UL
MONOCYTES NFR BLD: 9 %
NEUTROPHILS # BLD AUTO: 4.2 K/UL
NEUTROPHILS NFR BLD: 60.8 %
PHOSPHATE SERPL-MCNC: 3.7 MG/DL
PLATELET # BLD AUTO: 218 K/UL
PMV BLD AUTO: 11.3 FL
POCT GLUCOSE: 176 MG/DL (ref 70–110)
POCT GLUCOSE: 210 MG/DL (ref 70–110)
POCT GLUCOSE: 213 MG/DL (ref 70–110)
POCT GLUCOSE: 262 MG/DL (ref 70–110)
POCT GLUCOSE: 273 MG/DL (ref 70–110)
POCT GLUCOSE: 289 MG/DL (ref 70–110)
POCT GLUCOSE: 293 MG/DL (ref 70–110)
POTASSIUM SERPL-SCNC: 4.2 MMOL/L
PROT SERPL-MCNC: 5.9 G/DL
RBC # BLD AUTO: 2.65 M/UL
SODIUM SERPL-SCNC: 130 MMOL/L
WBC # BLD AUTO: 7.03 K/UL

## 2017-04-12 PROCEDURE — 25000003 PHARM REV CODE 250: Performed by: SURGERY

## 2017-04-12 PROCEDURE — 25000003 PHARM REV CODE 250: Performed by: STUDENT IN AN ORGANIZED HEALTH CARE EDUCATION/TRAINING PROGRAM

## 2017-04-12 PROCEDURE — 85025 COMPLETE CBC W/AUTO DIFF WBC: CPT

## 2017-04-12 PROCEDURE — 99232 SBSQ HOSP IP/OBS MODERATE 35: CPT | Mod: ,,, | Performed by: NURSE PRACTITIONER

## 2017-04-12 PROCEDURE — 94799 UNLISTED PULMONARY SVC/PX: CPT

## 2017-04-12 PROCEDURE — 63600175 PHARM REV CODE 636 W HCPCS: Performed by: NURSE PRACTITIONER

## 2017-04-12 PROCEDURE — 25000003 PHARM REV CODE 250: Performed by: PHYSICIAN ASSISTANT

## 2017-04-12 PROCEDURE — 63600175 PHARM REV CODE 636 W HCPCS: Performed by: SURGERY

## 2017-04-12 PROCEDURE — 97116 GAIT TRAINING THERAPY: CPT

## 2017-04-12 PROCEDURE — 86580 TB INTRADERMAL TEST: CPT | Performed by: SURGERY

## 2017-04-12 PROCEDURE — 20600001 HC STEP DOWN PRIVATE ROOM

## 2017-04-12 PROCEDURE — 80053 COMPREHEN METABOLIC PANEL: CPT

## 2017-04-12 PROCEDURE — 84100 ASSAY OF PHOSPHORUS: CPT

## 2017-04-12 PROCEDURE — C9113 INJ PANTOPRAZOLE SODIUM, VIA: HCPCS | Performed by: SURGERY

## 2017-04-12 PROCEDURE — 25000003 PHARM REV CODE 250: Performed by: NURSE PRACTITIONER

## 2017-04-12 PROCEDURE — 83735 ASSAY OF MAGNESIUM: CPT

## 2017-04-12 PROCEDURE — 97530 THERAPEUTIC ACTIVITIES: CPT

## 2017-04-12 PROCEDURE — 94664 DEMO&/EVAL PT USE INHALER: CPT

## 2017-04-12 RX ORDER — HYDROCODONE BITARTRATE AND ACETAMINOPHEN 7.5; 325 MG/15ML; MG/15ML
15 SOLUTION ORAL EVERY 4 HOURS PRN
Status: DISCONTINUED | OUTPATIENT
Start: 2017-04-12 | End: 2017-04-14

## 2017-04-12 RX ORDER — INSULIN ASPART 100 [IU]/ML
0-10 INJECTION, SOLUTION INTRAVENOUS; SUBCUTANEOUS
Status: DISCONTINUED | OUTPATIENT
Start: 2017-04-12 | End: 2017-04-15

## 2017-04-12 RX ORDER — INSULIN ASPART 100 [IU]/ML
2-3 INJECTION, SOLUTION INTRAVENOUS; SUBCUTANEOUS
Status: DISCONTINUED | OUTPATIENT
Start: 2017-04-12 | End: 2017-04-13

## 2017-04-12 RX ORDER — AMOXICILLIN 250 MG
1 CAPSULE ORAL DAILY
Status: DISCONTINUED | OUTPATIENT
Start: 2017-04-13 | End: 2017-04-20 | Stop reason: HOSPADM

## 2017-04-12 RX ORDER — POLYETHYLENE GLYCOL 3350 17 G/17G
17 POWDER, FOR SOLUTION ORAL DAILY
Status: DISCONTINUED | OUTPATIENT
Start: 2017-04-13 | End: 2017-04-20 | Stop reason: HOSPADM

## 2017-04-12 RX ORDER — HYDROCODONE BITARTRATE AND ACETAMINOPHEN 7.5; 325 MG/15ML; MG/15ML
10 SOLUTION ORAL EVERY 4 HOURS PRN
Status: DISCONTINUED | OUTPATIENT
Start: 2017-04-12 | End: 2017-04-14

## 2017-04-12 RX ADMIN — Medication 3 ML: at 05:04

## 2017-04-12 RX ADMIN — CALCIUM GLUCONATE: 94 INJECTION, SOLUTION INTRAVENOUS at 09:04

## 2017-04-12 RX ADMIN — DIPHENHYDRAMINE HYDROCHLORIDE 12.5 MG: 50 INJECTION, SOLUTION INTRAMUSCULAR; INTRAVENOUS at 09:04

## 2017-04-12 RX ADMIN — INSULIN ASPART 2 UNITS: 100 INJECTION, SOLUTION INTRAVENOUS; SUBCUTANEOUS at 12:04

## 2017-04-12 RX ADMIN — SODIUM CHLORIDE 2.5 UNITS/HR: 9 INJECTION, SOLUTION INTRAVENOUS at 05:04

## 2017-04-12 RX ADMIN — PANTOPRAZOLE SODIUM 40 MG: 40 INJECTION, POWDER, FOR SOLUTION INTRAVENOUS at 08:04

## 2017-04-12 RX ADMIN — INSULIN ASPART 2 UNITS: 100 INJECTION, SOLUTION INTRAVENOUS; SUBCUTANEOUS at 08:04

## 2017-04-12 RX ADMIN — CLINDAMYCIN IN 5 PERCENT DEXTROSE 900 MG: 18 INJECTION, SOLUTION INTRAVENOUS at 06:04

## 2017-04-12 RX ADMIN — Medication 5 UNITS: at 11:04

## 2017-04-12 RX ADMIN — INSULIN ASPART 3 UNITS: 100 INJECTION, SOLUTION INTRAVENOUS; SUBCUTANEOUS at 04:04

## 2017-04-12 RX ADMIN — INSULIN ASPART 2 UNITS: 100 INJECTION, SOLUTION INTRAVENOUS; SUBCUTANEOUS at 06:04

## 2017-04-12 RX ADMIN — Medication 10 ML: at 05:04

## 2017-04-12 RX ADMIN — CLINDAMYCIN IN 5 PERCENT DEXTROSE 900 MG: 18 INJECTION, SOLUTION INTRAVENOUS at 10:04

## 2017-04-12 RX ADMIN — I.V. FAT EMULSION 250 ML: 20 EMULSION INTRAVENOUS at 09:04

## 2017-04-12 RX ADMIN — Medication 10 ML: at 11:04

## 2017-04-12 RX ADMIN — ENOXAPARIN SODIUM 70 MG: 100 INJECTION SUBCUTANEOUS at 11:04

## 2017-04-12 RX ADMIN — Medication 1 CAPSULE: at 08:04

## 2017-04-12 RX ADMIN — CLINDAMYCIN IN 5 PERCENT DEXTROSE 900 MG: 18 INJECTION, SOLUTION INTRAVENOUS at 03:04

## 2017-04-12 RX ADMIN — HYDROCODONE BITARTRATE AND ACETAMINOPHEN 15 ML: 7.5; 325 SOLUTION ORAL at 09:04

## 2017-04-12 RX ADMIN — Medication 3 ML: at 09:04

## 2017-04-12 NOTE — PROGRESS NOTES
[facility]  General Surgery  Progress Note    Subjective:     Interval History:   Patient seen and examined this AM. NAEON.  G tube remains clamped; and she is tolerating it well with no N/V. +flatus and large BM yesterday. Pain well managed with PCA. TPN infusing.  On therapeutic lovenox for right arm DVT. RUE erythema improving    Post-Op Info:  Procedure(s) (LRB):  EXPLORATORY-LAPAROTOMY (N/A)  REPAIR  LYSIS-ADHESION (N/A)  PLACEMENT   11 Days Post-Op      Medications:  Continuous Infusions:   Amino acid 5% - dextrose 15% (CLINIMIX-E) solution with additives (1L  provides 510 kcal/L dextrose, with 50 gm AA, 150 gm CHO, Na 35, K 30, Mg 5, Ca 4.5, Acetate 80, Cl 39, Phos 15) 60 mL/hr at 04/11/17 2355    dextrose 5 % and 0.45 % NaCl with KCl 20 mEq 75 mL/hr at 04/11/17 2354    hydromorphone in 0.9 % NaCl 6 mg/30 ml      insulin (HUMAN R) infusion (adults) 2.5 Units/hr (04/12/17 0552)     Scheduled Meds:   clindamycin (CLEOCIN) IVPB  900 mg Intravenous Q8H    enoxaparin  1 mg/kg Subcutaneous Q12H    fat emulsion 20%  250 mL Intravenous Daily    Lactobacillus rhamnosus GG  1 capsule Oral Daily    pantoprazole  40 mg Intravenous Daily    sodium chloride 0.9%  10 mL Intravenous Q6H    sodium chloride 0.9%  3 mL Intravenous Q8H     PRN Meds:cyclobenzaprine, dextrose 50%, dextrose 50%, diphenhydrAMINE, glucagon (human recombinant), glucose, glucose, insulin aspart, naloxone, ondansetron, promethazine (PHENERGAN) IVPB, Flushing PICC Protocol **AND** sodium chloride 0.9% **AND** sodium chloride 0.9%     Objective:     Vital Signs (Most Recent):  Temp: 97.8 °F (36.6 °C) (04/12/17 0736)  Pulse: 80 (04/12/17 0736)  Resp: 18 (04/12/17 0736)  BP: (!) 121/58 (04/12/17 0736)  SpO2: (!) 94 % (04/12/17 0736) Vital Signs (24h Range):  Temp:  [97 °F (36.1 °C)-98.1 °F (36.7 °C)] 97.8 °F (36.6 °C)  Pulse:  [75-92] 80  Resp:  [16-20] 18  SpO2:  [94 %-98 %] 94 %  BP: (114-131)/(51-58) 121/58        Intake/Output Summary (Last 24 hours) at 04/12/17 0807  Last data filed at 04/12/17 0408   Gross per 24 hour   Intake          2981.84 ml   Output             1550 ml   Net          1431.84 ml       Physical Exam   Constitutional: She appears well-developed and well-nourished. No distress.   HENT:   Head: Normocephalic and atraumatic.   Cardiovascular: Normal rate and regular rhythm.    Pulmonary/Chest: Effort normal. No respiratory distress.   Abdominal:   Soft, appropriate TTP, incision - staples in place, clean dry and intact; incision opened at superior aspect, repacked with gauze this AM; decreased erythema   G-tube in place clamped  Right arm swelling and erythema improved with no fluctuance or induration       Significant Labs:  BMP:     Recent Labs  Lab 04/12/17  0400   *   *   K 4.2      CO2 24   BUN 10   CREATININE 0.8   CALCIUM 8.1*   MG 1.5*     CBC:     Recent Labs  Lab 04/12/17  0400   WBC 7.03   RBC 2.65*   HGB 8.0*   HCT 24.7*      MCV 93   MCH 30.2   MCHC 32.4     CMP:     Recent Labs  Lab 04/12/17  0400   *   CALCIUM 8.1*   ALBUMIN 1.9*   PROT 5.9*   *   K 4.2   CO2 24      BUN 10   CREATININE 0.8   ALKPHOS 63   ALT 12   AST 17   BILITOT 0.5     LFTs:     Recent Labs  Lab 04/12/17  0400   ALT 12   AST 17   ALKPHOS 63   BILITOT 0.5   PROT 5.9*   ALBUMIN 1.9*     All pertinent labs from the last 24 hours have been reviewed.    Assessment/Plan:   73 yo female s/p ex lap with TIFFANY and G-tube placement    S/p ex lap for small bowel obstruction  -keep G tube clamped. Notify MD and unclamp G tube for nausea/vomiting or increased abdominal pain  -advance to clear liquid diet  -discontinue mIVF  -continue TPN  -continue PCA; will transition to oral pain regimen once tolerating clear liquids  -nausea meds PRN  -continue clinda  -continue bowel regimen    DM  - home meds  - insulin gtt- appreciate endocrinology assistance    Muscle spasms  -flexeril      Protein calorie malnutrition  -Prealbumin = 7 (4/10/17)  -starting TPN 4/6/17; continue until adequate oral intake achieved    Right arm brachial vein thrombosis  - therapeutic lovenox; will transition to Coumadin when tolerating diet  - warm compresses  - no signs of fluid pocket amenable to drainage but will continue to monitor    Hypomagnesemia  -replace through custom TPN    Hyponatremia  -correction through custom TPN    Prophylaxis  GI/DVT    Dispo:  -PT/OT recommending home health; however, patient lives alone and will not have adequate support, so she and her family request referral to SNF; will discuss with OPAL Carney MD  PGY-3  Pager: [phone]

## 2017-04-12 NOTE — PT/OT/SLP PROGRESS
"Physical Therapy  Treatment    Anayeli Judd   MRN: [id number]   Admitting Diagnosis: SBO (small bowel obstruction)    PT Received On: 17  PT Start Time: 1055     PT Stop Time: 1119    PT Total Time (min): 24 min       Billable Minutes:  Gait [id number] and Therapeutic Activity 10    Treatment Type: Treatment  PT/PTA: PT     PTA Visit Number: 1       General Precautions: Standard, fall  Orthopedic Precautions: N/A   Braces: N/A         Subjective:  Communicated with nurse prior to session.  "I need to use the bathroom"  Pain Ratin/10     Location - Orientation: generalized  Location: abdomen  Pain Addressed: Pre-medicate for activity, Reposition (pt pushed OCA pump x 2 trials during treatment)       Objective:   Patient found with: peripheral IV, PCA, KOURTNEY drain, telemetry    Functional Mobility:  Bed Mobility:   Supine to Sit:  (pt up in bedside chair upon PT arrival/departure)    Transfers:  Sit <> Stand Assistance: Stand By Assistance (1 trial from bedside chair and 1 trial from arm chair)  Sit <> Stand Assistive Device: Rolling Walker    Gait:   Gait Distance: 70ft x 2 trials with seated rest periods in sitting between gait trials; ambulated with flexed trunk, decreased step length, and at slow pace  Assistance 1: Stand by Assistance  Gait Assistive Device: Rolling walker  Gait Deviation(s): decreased anant, forward lean  Requires assist to push IV pole  Balance:   Static Sit: FAIR+: Able to take MINIMAL challenges from all directions  Dynamic Sit: FAIR+: Maintains balance through MINIMAL excursions of active trunk motion  Static Stand: FAIR: Maintains without assist but unable to take challenges  Dynamic stand: FAIR+: Needs CLOSE SUPERVISION during gait and is able to right self with minor LOB     Therapeutic Activities and Exercises:  Pt transferred bedside chair to/from bedside commode with supervision with Rw and stood to don and doff her pull ups and was able to stand to clean herself with SBA. " Pt performed B LE exs in sitting x 15 reps: hip flex, knee ext, and ankle pumps.    AM-PAC 6 CLICK MOBILITY  How much help from another person does this patient currently need?   1 = Unable, Total/Dependent Assistance  2 = A lot, Maximum/Moderate Assistance  3 = A little, Minimum/Contact Guard/Supervision  4 = None, Modified [place]/Independent    Turning over in bed (including adjusting bedclothes, sheets and blankets)?: 3  Sitting down on and standing up from a chair with arms (e.g., wheelchair, bedside commode, etc.): 4  Moving from lying on back to sitting on the side of the bed?: 3  Moving to and from a bed to a chair (including a wheelchair)?: 3  Need to walk in hospital room?: 3  Climbing 3-5 steps with a railing?: 3  Total Score: 19    AM-PAC Raw Score CMS G-Code Modifier Level of Impairment Assistance   6 % Total / Unable   7 - 9 CM 80 - 100% Maximal Assist   10 - 14 CL 60 - 80% Moderate Assist   15 - 19 CK 40 - 60% Moderate Assist   20 - 22 CJ 20 - 40% Minimal Assist   23 CI 1-20% SBA / CGA   24 CH 0% Independent/ Mod I     Patient left up in chair with all lines intact, call button in reach and nurse notified.    Assessment:  Anayeli Judd is a 72 y.o. female with a medical diagnosis of SBO (small bowel obstruction) and presents with difficulty with functional mobility due to pain, weakness, and fatigue.    Rehab identified problem list/impairments: Rehab identified problem list/impairments: weakness, impaired functional mobilty, impaired endurance, impaired self care skills, pain    Rehab potential is good.    Activity tolerance: Good    Discharge recommendations: Discharge Facility/Level Of Care Needs: home health PT (PT spoke with  pt's daughter who states pt has been telling people she will have help at home and that she has grab bars on the toilet and tub because she is worried that she will be put in a nursing home.  )     Barriers to discharge: Barriers to Discharge: Decreased  caregiver support (lives alone, daughter unable to assist)    Equipment recommendations: Equipment Needed After Discharge: none (pt has equipment needed upon D/C)     GOALS:   Physical Therapy Goals        Problem: Physical Therapy Goal    Goal Priority Disciplines Outcome Goal Variances Interventions   Physical Therapy Goal     PT/OT, PT Ongoing (interventions implemented as appropriate)     Description:  Goals to be met by: 2017     Patient will increase functional independence with mobility by performin. Supine to sit with Stand-by Assistance  2. Sit to supine with Stand-by Assistance  3. Bed to chair transfer with Modified [place] using Rolling Walker-not met  4. Gait  x 150 feet with [place] using no assistive device. -not met  5. Pt sit to stand with or without RW as needed for safety with mod independent-not met             PLAN:    Patient to be seen 3 x/week  to address the above listed problems via gait training, therapeutic activities, therapeutic exercises  Plan of Care expires: 17  Plan of Care reviewed with: patient, daughter    Keya JENNINGS Andre, PT  2017

## 2017-04-12 NOTE — PLAN OF CARE
Problem: Physical Therapy Goal  Goal: Physical Therapy Goal  Goals to be met by: 2017     Patient will increase functional independence with mobility by performin. Supine to sit with Stand-by Assistance  2. Sit to supine with Stand-by Assistance  3. Bed to chair transfer with Modified [place] using Rolling Walker-not met  4. Gait x 150 feet with [place] using no assistive device. -not met  5. Pt sit to stand with or without RW as needed for safety with mod independent-not met   Outcome: Ongoing (interventions implemented as appropriate)  Pt's goals revised as needed and pt will continue to benefit from skilled PT services to work towards improved functional mobility including: bed mobility, transfers, and gait. Keya Powell PT 17

## 2017-04-12 NOTE — PROGRESS NOTES
"   [facility]  Endocrinology  Progress Note    Admit Date: 3/30/2017     Reason for Consult: Management of T2DM, Hyperglycemia     Surgical Procedure and Date: n/a    Diabetes diagnosis year: PreDM for a "few years" per family report     Home Diabetes Medications:  Metformin 750 mg BID     How often checking glucose at home? One   BG readings on regimen: <140  Hypoglycemia on the regimen?  No  Missed doses on regimen?  No      HPI:   Patient is a 72 y.o. female with a diagnosis of Pre-DM per yasmani's report. However, A1c upon admission 6.6% and has been in DM range since 11/2016. Followed by PCP.    Chronic medical conditions include: remote colon CA (s/p colon resection), SBO 2/2 adhesions, T2DM, GERD, fatty liver, HLD, and  hypothyroidism.     Patient presented to ED with abdominal discomfort. Admitted for treatment of SBO. Endocrine consulted for BG management.       Interval HPI:   BG globally elevated.  On transition at 2.5 u/hr.  Remains npo, no hypoglycemia.   Afebrile.      BP (!) 121/58 (BP Location: Left arm, Patient Position: Lying, BP Method: Automatic)  Pulse 80  Temp 97.8 °F (36.6 °C) (Oral)   Resp 18  Ht 5' 4" (1.626 m)  Wt 69.9 kg (154 lb)  LMP  (LMP Unknown)  SpO2 (!) 94%  Breastfeeding? No  BMI 26.43 kg/m2    Labs Reviewed and Include      Recent Labs  Lab 04/12/17  0400   *   CALCIUM 8.1*   ALBUMIN 1.9*   PROT 5.9*   *   K 4.2   CO2 24      BUN 10   CREATININE 0.8   ALKPHOS 63   ALT 12   AST 17   BILITOT 0.5     Lab Results   Component Value Date    WBC 7.03 04/12/2017    HGB 8.0 (L) 04/12/2017    HCT 24.7 (L) 04/12/2017    MCV 93 04/12/2017     04/12/2017     No results for input(s): TSH, FREET4 in the last 168 hours.  Lab Results   Component Value Date    HGBA1C 6.6 (H) 03/30/2017       Nutritional status:   Body mass index is 26.43 kg/(m^2).  Lab Results   Component Value Date    ALBUMIN 1.9 (L) 04/12/2017    ALBUMIN 1.9 (L) 04/11/2017    " ALBUMIN 1.7 (L) 04/10/2017     Lab Results   Component Value Date    PREALBUMIN 7 (L) 04/10/2017    PREALBUMIN 4 (L) 04/06/2017    PREALBUMIN 4 (L) 04/04/2017       Estimated Creatinine Clearance: 59.2 mL/min (based on Cr of 0.8).    Accu-Checks  Recent Labs      04/11/17   0809  04/11/17   0908  04/11/17   1008  04/11/17   1203  04/11/17   1357  04/11/17   1600  04/11/17   2043  04/12/17   0000  04/12/17   0416  04/12/17   0808   POCTGLUCOSE  180*  159*  131*  155*  260*  251*  253*  262*  289*  273*       Current Medications and/or Treatments Impacting Glycemic Control  Immunotherapy:  Immunosuppressants     None        Steroids:   Hormones     None        Pressors:    Autonomic Drugs     None          Transition at 2.5 u/hr, low dose correction scale, bg monitoring q4h  Hyperglycemia/Diabetes Medications: Antihyperglycemics     Start     Stop Route Frequency Ordered    04/11/17 1800  insulin regular (Humulin R) 100 Units in sodium chloride 0.9% 100 mL infusion      -- IV Continuous 04/11/17 1647    04/12/17 1102  insulin aspart pen 0-10 Units      -- SubQ As needed (PRN) 04/12/17 1002          ASSESSMENT and PLAN    Type 2 diabetes mellitus without complication, without long-term current use of insulin  BG goal 140-180 while hospitalized.  BG globally elevated overnight.  BG monitoring q4h  Transition increase to 3 u/hr  Change to moderate dose correction scale 180/+2 Estimated Creatinine Clearance: 59.2 mL/min (based on Cr of 0.8).    Addendum: add novolog 2-3 units if eating 15-30 gm, >30gm (3 units)    DISCHARGE PLANNING - need discuss with pt A1c in DM range since 11/2016  Continue Metformin home dose (as long as kidney and liver function WNL on discharge)  Has Metformin and BG meter and supplies at home     * SBO (small bowel obstruction)  Per surgery, NPO on TPN      Hypothyroid  Lab Results   Component Value Date    TSH 0.319 (L) 02/06/2017       Home dose of synthroid 88 mcg PO daily. Recheck as  outpatient      History of colon cancer  Noted       Sequelae of hyperalimentation  On Clinimix (15%) @ 60 ml/hr, increasing insulin needs        ROBIN Jules, FNP  Endocrinology  [facility]

## 2017-04-12 NOTE — PLAN OF CARE
Problem: Patient Care Overview  Goal: Plan of Care Review  Outcome: Ongoing (interventions implemented as appropriate)  POC reviewed with pt. Pt AAOx4, VSS, afebrile, NSR on tele. Pt SpO2>92% on rm air. ML with staples - gauze for some drainage. Pt complains of mild pain, PCA pump in use. G tube clamped, no complaints of nausea/vomiting. Gauze around gtube changed d/t moist drainage.  TPN continued at 60. Acuchecks done q4h, insulin drip continued, coverage given per orders. PICC dressing changed. Pt remains NPO except ice. Pt up to bedside commode with 1 person assist. Pt remains free from falls and injuries, will continue to monitor.

## 2017-04-12 NOTE — SUBJECTIVE & OBJECTIVE
"Interval HPI:   BG globally elevated.  On transition at 2.5 u/hr.  Remains npo, no hypoglycemia.   Afebrile.      BP (!) 121/58 (BP Location: Left arm, Patient Position: Lying, BP Method: Automatic)  Pulse 80  Temp 97.8 °F (36.6 °C) (Oral)   Resp 18  Ht 5' 4" (1.626 m)  Wt 69.9 kg (154 lb)  LMP  (LMP Unknown)  SpO2 (!) 94%  Breastfeeding? No  BMI 26.43 kg/m2    Labs Reviewed and Include      Recent Labs  Lab 04/12/17  0400   *   CALCIUM 8.1*   ALBUMIN 1.9*   PROT 5.9*   *   K 4.2   CO2 24      BUN 10   CREATININE 0.8   ALKPHOS 63   ALT 12   AST 17   BILITOT 0.5     Lab Results   Component Value Date    WBC 7.03 04/12/2017    HGB 8.0 (L) 04/12/2017    HCT 24.7 (L) 04/12/2017    MCV 93 04/12/2017     04/12/2017     No results for input(s): TSH, FREET4 in the last 168 hours.  Lab Results   Component Value Date    HGBA1C 6.6 (H) 03/30/2017       Nutritional status:   Body mass index is 26.43 kg/(m^2).  Lab Results   Component Value Date    ALBUMIN 1.9 (L) 04/12/2017    ALBUMIN 1.9 (L) 04/11/2017    ALBUMIN 1.7 (L) 04/10/2017     Lab Results   Component Value Date    PREALBUMIN 7 (L) 04/10/2017    PREALBUMIN 4 (L) 04/06/2017    PREALBUMIN 4 (L) 04/04/2017       Estimated Creatinine Clearance: 59.2 mL/min (based on Cr of 0.8).    Accu-Checks  Recent Labs      04/11/17   0809  04/11/17   0908  04/11/17   1008  04/11/17   1203  04/11/17   1357  04/11/17   1600  04/11/17   2043  04/12/17   0000  04/12/17   0416  04/12/17   0808   POCTGLUCOSE  180*  159*  131*  155*  260*  251*  253*  262*  289*  273*       Current Medications and/or Treatments Impacting Glycemic Control  Immunotherapy:  Immunosuppressants     None        Steroids:   Hormones     None        Pressors:    Autonomic Drugs     None          Transition at 2.5 u/hr, low dose correction scale, bg monitoring q4h  Hyperglycemia/Diabetes Medications: Antihyperglycemics     Start     Stop Route Frequency Ordered    04/11/17 1800  " insulin regular (Humulin R) 100 Units in sodium chloride 0.9% 100 mL infusion      -- IV Continuous 04/11/17 1647    04/12/17 1102  insulin aspart pen 0-10 Units      -- SubQ As needed (PRN) 04/12/17 1002

## 2017-04-12 NOTE — PLAN OF CARE
Problem: Patient Care Overview  Goal: Plan of Care Review  Outcome: Ongoing (interventions implemented as appropriate)  POC reviewed with patient who verbalized understanding. AAOX4. Pt O2 sats stable on RA. G tube intact, patent, and flushed with 20cc of water.Right KOURTNEY drain intact and patent. Pt voiding adequate output. Pt on tele running normal sinus.  Left PICC intact and patent. TPN @60. Insulin drip at 3units/hr. BG being monitored Q4 and being controlled with prn and scheduled insulin. Pt tolerating diet well. Pain being controlled with PCA. Pt remained free from falls throughout the shift. VSS. Will continue to monitor.

## 2017-04-13 LAB
ALBUMIN SERPL BCP-MCNC: 2 G/DL
ALP SERPL-CCNC: 69 U/L
ALT SERPL W/O P-5'-P-CCNC: 15 U/L
ANION GAP SERPL CALC-SCNC: 8 MMOL/L
AST SERPL-CCNC: 25 U/L
BASOPHILS # BLD AUTO: 0.04 K/UL
BASOPHILS NFR BLD: 0.6 %
BILIRUB SERPL-MCNC: 0.5 MG/DL
BUN SERPL-MCNC: 10 MG/DL
CALCIUM SERPL-MCNC: 8.3 MG/DL
CHLORIDE SERPL-SCNC: 100 MMOL/L
CO2 SERPL-SCNC: 25 MMOL/L
CREAT SERPL-MCNC: 0.8 MG/DL
DIFFERENTIAL METHOD: ABNORMAL
EOSINOPHIL # BLD AUTO: 0.3 K/UL
EOSINOPHIL NFR BLD: 4.4 %
ERYTHROCYTE [DISTWIDTH] IN BLOOD BY AUTOMATED COUNT: 14.4 %
EST. GFR  (AFRICAN AMERICAN): >60 ML/MIN/1.73 M^2
EST. GFR  (NON AFRICAN AMERICAN): >60 ML/MIN/1.73 M^2
GLUCOSE SERPL-MCNC: 180 MG/DL
HCT VFR BLD AUTO: 24.7 %
HGB BLD-MCNC: 8.2 G/DL
INR PPP: 1.1
LYMPHOCYTES # BLD AUTO: 1.9 K/UL
LYMPHOCYTES NFR BLD: 27.2 %
MAGNESIUM SERPL-MCNC: 1.6 MG/DL
MCH RBC QN AUTO: 31.1 PG
MCHC RBC AUTO-ENTMCNC: 33.2 %
MCV RBC AUTO: 94 FL
MONOCYTES # BLD AUTO: 0.8 K/UL
MONOCYTES NFR BLD: 11.4 %
NEUTROPHILS # BLD AUTO: 3.8 K/UL
NEUTROPHILS NFR BLD: 56.1 %
PHOSPHATE SERPL-MCNC: 4 MG/DL
PLATELET # BLD AUTO: 240 K/UL
PMV BLD AUTO: 11.7 FL
POCT GLUCOSE: 144 MG/DL (ref 70–110)
POCT GLUCOSE: 198 MG/DL (ref 70–110)
POCT GLUCOSE: 216 MG/DL (ref 70–110)
POCT GLUCOSE: 228 MG/DL (ref 70–110)
POCT GLUCOSE: 241 MG/DL (ref 70–110)
POCT GLUCOSE: 264 MG/DL (ref 70–110)
POTASSIUM SERPL-SCNC: 4 MMOL/L
PREALB SERPL-MCNC: 9 MG/DL
PROT SERPL-MCNC: 6.2 G/DL
PROTHROMBIN TIME: 11.5 SEC
RBC # BLD AUTO: 2.64 M/UL
SODIUM SERPL-SCNC: 133 MMOL/L
WBC # BLD AUTO: 6.84 K/UL

## 2017-04-13 PROCEDURE — 84100 ASSAY OF PHOSPHORUS: CPT

## 2017-04-13 PROCEDURE — 94761 N-INVAS EAR/PLS OXIMETRY MLT: CPT

## 2017-04-13 PROCEDURE — 94664 DEMO&/EVAL PT USE INHALER: CPT

## 2017-04-13 PROCEDURE — 83735 ASSAY OF MAGNESIUM: CPT

## 2017-04-13 PROCEDURE — 84134 ASSAY OF PREALBUMIN: CPT

## 2017-04-13 PROCEDURE — 85025 COMPLETE CBC W/AUTO DIFF WBC: CPT

## 2017-04-13 PROCEDURE — 25000003 PHARM REV CODE 250: Performed by: STUDENT IN AN ORGANIZED HEALTH CARE EDUCATION/TRAINING PROGRAM

## 2017-04-13 PROCEDURE — 25000003 PHARM REV CODE 250: Performed by: SURGERY

## 2017-04-13 PROCEDURE — 85610 PROTHROMBIN TIME: CPT

## 2017-04-13 PROCEDURE — 99232 SBSQ HOSP IP/OBS MODERATE 35: CPT | Mod: ,,, | Performed by: NURSE PRACTITIONER

## 2017-04-13 PROCEDURE — 63600175 PHARM REV CODE 636 W HCPCS: Performed by: NURSE PRACTITIONER

## 2017-04-13 PROCEDURE — 80053 COMPREHEN METABOLIC PANEL: CPT

## 2017-04-13 PROCEDURE — 25000003 PHARM REV CODE 250: Performed by: NURSE PRACTITIONER

## 2017-04-13 PROCEDURE — 25000003 PHARM REV CODE 250: Performed by: PHYSICIAN ASSISTANT

## 2017-04-13 PROCEDURE — 20600001 HC STEP DOWN PRIVATE ROOM

## 2017-04-13 PROCEDURE — 63600175 PHARM REV CODE 636 W HCPCS: Performed by: SURGERY

## 2017-04-13 PROCEDURE — C9113 INJ PANTOPRAZOLE SODIUM, VIA: HCPCS | Performed by: SURGERY

## 2017-04-13 RX ORDER — INSULIN ASPART 100 [IU]/ML
2-4 INJECTION, SOLUTION INTRAVENOUS; SUBCUTANEOUS
Status: DISCONTINUED | OUTPATIENT
Start: 2017-04-14 | End: 2017-04-15

## 2017-04-13 RX ADMIN — Medication 10 ML: at 05:04

## 2017-04-13 RX ADMIN — INSULIN ASPART 4 UNITS: 100 INJECTION, SOLUTION INTRAVENOUS; SUBCUTANEOUS at 09:04

## 2017-04-13 RX ADMIN — HYDROCODONE BITARTRATE AND ACETAMINOPHEN 15 ML: 7.5; 325 SOLUTION ORAL at 03:04

## 2017-04-13 RX ADMIN — INSULIN ASPART 2 UNITS: 100 INJECTION, SOLUTION INTRAVENOUS; SUBCUTANEOUS at 03:04

## 2017-04-13 RX ADMIN — INSULIN ASPART 2 UNITS: 100 INJECTION, SOLUTION INTRAVENOUS; SUBCUTANEOUS at 12:04

## 2017-04-13 RX ADMIN — Medication 3 ML: at 05:04

## 2017-04-13 RX ADMIN — INSULIN ASPART 4 UNITS: 100 INJECTION, SOLUTION INTRAVENOUS; SUBCUTANEOUS at 01:04

## 2017-04-13 RX ADMIN — CLINDAMYCIN IN 5 PERCENT DEXTROSE 900 MG: 18 INJECTION, SOLUTION INTRAVENOUS at 03:04

## 2017-04-13 RX ADMIN — PANTOPRAZOLE SODIUM 40 MG: 40 INJECTION, POWDER, FOR SOLUTION INTRAVENOUS at 09:04

## 2017-04-13 RX ADMIN — STANDARDIZED SENNA CONCENTRATE AND DOCUSATE SODIUM 1 TABLET: 8.6; 5 TABLET, FILM COATED ORAL at 09:04

## 2017-04-13 RX ADMIN — Medication 3 ML: at 09:04

## 2017-04-13 RX ADMIN — WARFARIN SODIUM 3 MG: 2 TABLET ORAL at 06:04

## 2017-04-13 RX ADMIN — ENOXAPARIN SODIUM 70 MG: 100 INJECTION SUBCUTANEOUS at 10:04

## 2017-04-13 RX ADMIN — ENOXAPARIN SODIUM 70 MG: 100 INJECTION SUBCUTANEOUS at 01:04

## 2017-04-13 RX ADMIN — HYDROCODONE BITARTRATE AND ACETAMINOPHEN 15 ML: 7.5; 325 SOLUTION ORAL at 10:04

## 2017-04-13 RX ADMIN — POLYETHYLENE GLYCOL 3350 17 G: 17 POWDER, FOR SOLUTION ORAL at 09:04

## 2017-04-13 RX ADMIN — CALCIUM GLUCONATE: 94 INJECTION, SOLUTION INTRAVENOUS at 09:04

## 2017-04-13 RX ADMIN — I.V. FAT EMULSION 250 ML: 20 EMULSION INTRAVENOUS at 09:04

## 2017-04-13 RX ADMIN — INSULIN ASPART 2 UNITS: 100 INJECTION, SOLUTION INTRAVENOUS; SUBCUTANEOUS at 06:04

## 2017-04-13 RX ADMIN — Medication 10 ML: at 12:04

## 2017-04-13 RX ADMIN — CLINDAMYCIN IN 5 PERCENT DEXTROSE 900 MG: 18 INJECTION, SOLUTION INTRAVENOUS at 01:04

## 2017-04-13 RX ADMIN — HYDROCODONE BITARTRATE AND ACETAMINOPHEN 15 ML: 7.5; 325 SOLUTION ORAL at 01:04

## 2017-04-13 RX ADMIN — CLINDAMYCIN IN 5 PERCENT DEXTROSE 900 MG: 18 INJECTION, SOLUTION INTRAVENOUS at 09:04

## 2017-04-13 RX ADMIN — SODIUM CHLORIDE 3 UNITS/HR: 9 INJECTION, SOLUTION INTRAVENOUS at 05:04

## 2017-04-13 RX ADMIN — Medication 1 CAPSULE: at 09:04

## 2017-04-13 NOTE — PT/OT/SLP PROGRESS
Physical Therapy      Anayeli Judd  MRN: [id number]    Patient not seen today secondary to patient unwilling to participate.  Patient covered in blankets and c/o feeling very cold.  She has just begun eating lunch and asked therapist to return.  Therapist unable to return on this date. Will follow-up as able.    Ritika Philip, PT   4/13/2017

## 2017-04-13 NOTE — SUBJECTIVE & OBJECTIVE
"Interval HPI:   No hypoglycemia.   BG at or above goal.   On transition overnight at 3.0 u/hr.  Remains on tpn and clear liquid diet.       /63 (BP Location: Left arm, Patient Position: Lying, BP Method: Automatic)  Pulse 83  Temp 98.8 °F (37.1 °C) (Oral)   Resp 17  Ht 5' 4" (1.626 m)  Wt 69.9 kg (154 lb)  LMP  (LMP Unknown)  SpO2 (!) 94%  Breastfeeding? No  BMI 26.43 kg/m2    Labs Reviewed and Include      Recent Labs  Lab 04/13/17  0400   *   CALCIUM 8.3*   ALBUMIN 2.0*   PROT 6.2   *   K 4.0   CO2 25      BUN 10   CREATININE 0.8   ALKPHOS 69   ALT 15   AST 25   BILITOT 0.5     Lab Results   Component Value Date    WBC 6.84 04/13/2017    HGB 8.2 (L) 04/13/2017    HCT 24.7 (L) 04/13/2017    MCV 94 04/13/2017     04/13/2017     No results for input(s): TSH, FREET4 in the last 168 hours.  Lab Results   Component Value Date    HGBA1C 6.6 (H) 03/30/2017       Nutritional status:   Body mass index is 26.43 kg/(m^2).  Lab Results   Component Value Date    ALBUMIN 2.0 (L) 04/13/2017    ALBUMIN 1.9 (L) 04/12/2017    ALBUMIN 1.9 (L) 04/11/2017     Lab Results   Component Value Date    PREALBUMIN 9 (L) 04/13/2017    PREALBUMIN 7 (L) 04/10/2017    PREALBUMIN 4 (L) 04/06/2017       Estimated Creatinine Clearance: 59.2 mL/min (based on Cr of 0.8).    Accu-Checks  Recent Labs      04/11/17   2043  04/12/17   0000  04/12/17   0416  04/12/17   0808  04/12/17   1202  04/12/17   1707  04/12/17   1853  04/12/17   2016  04/13/17   0008  04/13/17   0350   POCTGLUCOSE  253*  262*  289*  273*  210*  293*  213*  176*  216*  198*       Current Medications and/or Treatments Impacting Glycemic Control  Immunotherapy:  Immunosuppressants     None        Steroids:   Hormones     None        Pressors:    Autonomic Drugs     None          Transition 3.0 u/hr,  novolog 2-3 units pc w/ mod dose correction scale  Hyperglycemia/Diabetes Medications: Antihyperglycemics     Start     Stop Route Frequency " Ordered    04/11/17 1800  insulin regular (Humulin R) 100 Units in sodium chloride 0.9% 100 mL infusion      -- IV Continuous 04/11/17 1647    04/12/17 1102  insulin aspart pen 0-10 Units      -- SubQ As needed (PRN) 04/12/17 1002    04/12/17 1645  insulin aspart pen 2-3 Units      -- SubQ 3 times daily with meals 04/12/17 1234

## 2017-04-13 NOTE — PROGRESS NOTES
"[facility]  Endocrinology  Progress Note    Admit Date: 3/30/2017     Reason for Consult: Management of T2DM, Hyperglycemia     Surgical Procedure and Date: n/a    Diabetes diagnosis year: PreDM for a "few years" per family report     Home Diabetes Medications:  Metformin 750 mg BID     How often checking glucose at home? One   BG readings on regimen: <140  Hypoglycemia on the regimen?  No  Missed doses on regimen?  No      HPI:   Patient is a 72 y.o. female with a diagnosis of Pre-DM per yasmani's report. However, A1c upon admission 6.6% and has been in DM range since 11/2016. Followed by PCP.    Chronic medical conditions include: remote colon CA (s/p colon resection), SBO 2/2 adhesions, T2DM, GERD, fatty liver, HLD, and  hypothyroidism.     Patient presented to ED with abdominal discomfort. Admitted for treatment of SBO. Endocrine consulted for BG management.       Interval HPI:   No hypoglycemia.   BG at or above goal.   On transition overnight at 3.0 u/hr.  Remains on tpn and clear liquid diet.   No nausea.      /63 (BP Location: Left arm, Patient Position: Lying, BP Method: Automatic)  Pulse 83  Temp 98.8 °F (37.1 °C) (Oral)   Resp 17  Ht 5' 4" (1.626 m)  Wt 69.9 kg (154 lb)  LMP  (LMP Unknown)  SpO2 (!) 94%  Breastfeeding? No  BMI 26.43 kg/m2    Labs Reviewed and Include      Recent Labs  Lab 04/13/17  0400   *   CALCIUM 8.3*   ALBUMIN 2.0*   PROT 6.2   *   K 4.0   CO2 25      BUN 10   CREATININE 0.8   ALKPHOS 69   ALT 15   AST 25   BILITOT 0.5     Lab Results   Component Value Date    WBC 6.84 04/13/2017    HGB 8.2 (L) 04/13/2017    HCT 24.7 (L) 04/13/2017    MCV 94 04/13/2017     04/13/2017     No results for input(s): TSH, FREET4 in the last 168 hours.  Lab Results   Component Value Date    HGBA1C 6.6 (H) 03/30/2017       Nutritional status:   Body mass index is 26.43 kg/(m^2).  Lab Results   Component Value Date    ALBUMIN 2.0 (L) 04/13/2017    " ALBUMIN 1.9 (L) 04/12/2017    ALBUMIN 1.9 (L) 04/11/2017     Lab Results   Component Value Date    PREALBUMIN 9 (L) 04/13/2017    PREALBUMIN 7 (L) 04/10/2017    PREALBUMIN 4 (L) 04/06/2017       Estimated Creatinine Clearance: 59.2 mL/min (based on Cr of 0.8).    Accu-Checks  Recent Labs      04/11/17   2043  04/12/17   0000  04/12/17   0416  04/12/17   0808  04/12/17   1202  04/12/17   1707  04/12/17   1853  04/12/17   2016  04/13/17   0008  04/13/17   0350   POCTGLUCOSE  253*  262*  289*  273*  210*  293*  213*  176*  216*  198*       Current Medications and/or Treatments Impacting Glycemic Control  Immunotherapy:  Immunosuppressants     None        Steroids:   Hormones     None        Pressors:    Autonomic Drugs     None          Transition 3.0 u/hr,  novolog 2-3 units pc w/ mod dose correction scale  Hyperglycemia/Diabetes Medications: Antihyperglycemics     Start     Stop Route Frequency Ordered    04/11/17 1800  insulin regular (Humulin R) 100 Units in sodium chloride 0.9% 100 mL infusion      -- IV Continuous 04/11/17 1647    04/12/17 1102  insulin aspart pen 0-10 Units      -- SubQ As needed (PRN) 04/12/17 1002    04/12/17 1645  insulin aspart pen 2-3 Units      -- SubQ 3 times daily with meals 04/12/17 1238          ASSESSMENT and PLAN    Type 2 diabetes mellitus without complication, without long-term current use of insulin  BG goal 140-180 while hospitalized.  BG at or above goal   BG monitoring q4h  Increase transition 3.3 u/hr---this amount will help w/ calculations needed for regular insulin placed in tpn (outpatient if needed)  novolog 2-4 units pc depending on intake.  Continue moderate dose correction scale 180/+2     DISCHARGE PLANNING - need discuss with pt A1c in DM range since 11/2016  Continue Metformin home dose (as long as kidney and liver function WNL on discharge)  Has Metformin and BG meter and supplies at home     * SBO (small bowel obstruction)  Per surgery, NPO on  TPN      Hypothyroid  Lab Results   Component Value Date    TSH 0.319 (L) 02/06/2017       Home dose of synthroid 88 mcg PO daily. Recheck as outpatient      History of colon cancer  Noted       Sequelae of hyperalimentation  On Clinimix (15%) @ 60 ml/hr, increasing insulin needs        ROBIN Jules, FNP  Endocrinology  [facility]

## 2017-04-13 NOTE — PROGRESS NOTES
[facility]  General Surgery  Progress Note    Subjective:     Interval History:   Patient seen and examined this AM. NAEON.  G tube remains clamped; and she is tolerating CLD well with no N/V. +bowel function. Pain well managed. TPN infusing.  On therapeutic lovenox for right arm DVT.    Post-Op Info:  Procedure(s) (LRB):  EXPLORATORY-LAPAROTOMY (N/A)  REPAIR  LYSIS-ADHESION (N/A)  PLACEMENT   12 Days Post-Op      Medications:  Continuous Infusions:   insulin (HUMAN R) infusion (adults) 3 Units/hr (04/13/17 0552)    TPN ADULT CENTRAL LINE CUSTOM 60 mL/hr at 04/12/17 2152     Scheduled Meds:   clindamycin (CLEOCIN) IVPB  900 mg Intravenous Q8H    enoxaparin  1 mg/kg Subcutaneous Q12H    fat emulsion 20%  250 mL Intravenous Daily    insulin aspart  2-3 Units Subcutaneous TIDWM    Lactobacillus rhamnosus GG  1 capsule Oral Daily    pantoprazole  40 mg Intravenous Daily    polyethylene glycol  17 g Oral Daily    senna-docusate 8.6-50 mg  1 tablet Oral Daily    sodium chloride 0.9%  10 mL Intravenous Q6H    sodium chloride 0.9%  3 mL Intravenous Q8H     PRN Meds:cyclobenzaprine, dextrose 50%, dextrose 50%, diphenhydrAMINE, glucagon (human recombinant), glucose, glucose, hydrocodone-apap 2.5-108 MG/5 ML, hydrocodone-apap 2.5-108 MG/5 ML, insulin aspart, ondansetron, promethazine (PHENERGAN) IVPB, Flushing PICC Protocol **AND** sodium chloride 0.9% **AND** sodium chloride 0.9%     Objective:     Vital Signs (Most Recent):  Temp: 98.8 °F (37.1 °C) (04/13/17 0340)  Pulse: 83 (04/13/17 0700)  Resp: 17 (04/13/17 0340)  BP: 133/63 (04/13/17 0340)  SpO2: (!) 94 % (04/13/17 0340) Vital Signs (24h Range):  Temp:  [97.8 °F (36.6 °C)-98.8 °F (37.1 °C)] 98.8 °F (37.1 °C)  Pulse:  [76-90] 83  Resp:  [16-20] 17  SpO2:  [94 %-96 %] 94 %  BP: (107-138)/(52-73) 133/63       Intake/Output Summary (Last 24 hours) at 04/13/17 0738  Last data filed at 04/13/17 0342   Gross per 24 hour   Intake          2592.48  ml   Output             2035 ml   Net           557.48 ml       Physical Exam   Constitutional: She appears well-developed and well-nourished. No distress.   HENT:   Head: Normocephalic and atraumatic.   Cardiovascular: Normal rate and regular rhythm.    Pulmonary/Chest: Effort normal. No respiratory distress.   Abdominal:   Soft, appropriate TTP, incision - staples in place, clean dry and intact; incision opened at superior aspect, repacked with gauze this AM; decreased erythema   G-tube in place clamped  Right arm swelling and erythema improved with no fluctuance or induration       Significant Labs:  BMP:     Recent Labs  Lab 04/13/17  0400   *   *   K 4.0      CO2 25   BUN 10   CREATININE 0.8   CALCIUM 8.3*   MG 1.6     CBC:     Recent Labs  Lab 04/13/17 0400   WBC 6.84   RBC 2.64*   HGB 8.2*   HCT 24.7*      MCV 94   MCH 31.1*   MCHC 33.2     CMP:     Recent Labs  Lab 04/13/17  0400   *   CALCIUM 8.3*   ALBUMIN 2.0*   PROT 6.2   *   K 4.0   CO2 25      BUN 10   CREATININE 0.8   ALKPHOS 69   ALT 15   AST 25   BILITOT 0.5     LFTs:     Recent Labs  Lab 04/13/17  0400   ALT 15   AST 25   ALKPHOS 69   BILITOT 0.5   PROT 6.2   ALBUMIN 2.0*     All pertinent labs from the last 24 hours have been reviewed.    Assessment/Plan:   73 yo female s/p ex lap with TIFFANY and G-tube placement    S/p ex lap for small bowel obstruction  -keep G tube clamped. Notify MD and unclamp G tube for nausea/vomiting or increased abdominal pain  -continue clear liquid diet; will advance to regular diet if patient tolerates clear liquids well again this AM  -continue TPN  -transition to oral pain regimen  -nausea meds PRN  -continue clinda  -continue bowel regimen    DM  - home meds  - insulin gtt- appreciate endocrinology assistance    Muscle spasms  -flexeril     Protein calorie malnutrition  -Prealbumin = 9 (4/13/17)  -starting TPN 4/6/17; continue until adequate oral intake achieved    Right  arm brachial vein thrombosis  - therapeutic lovenox; will transition to Coumadin today  - warm compresses    Hypomagnesemia  -replace through custom TPN    Hyponatremia  -correction through custom TPN    Prophylaxis  GI/DVT    Dispo:  -PT/OT recommending home health; however, patient lives alone and will not have adequate support, so she and her family request referral to SNF; will discuss with OPAL Carney MD  PGY-3  Pager: [phone]

## 2017-04-13 NOTE — PLAN OF CARE
Problem: Patient Care Overview  Goal: Plan of Care Review  Outcome: Ongoing (interventions implemented as appropriate)  POC reviewed with pt. Pt AAOx4, VSS, afebrile, NSR on tele. Pt SpO2>92% on rm air. ML with staples - gauze for some drainage. Top of incision open, packed with gauze. Dressing changed during shift. Pt complains of mild pain, Prn meds given. G tube clamped, no complaints of nausea/vomiting. Gauze around gtube changed d/t moist drainage. TPN continued at 60. Acuchecks done q4h, insulin drip continued, coverage given per orders.Pt tolerating clear liquid diet. Pt up to bedside commode with 1 person assist. Pt remains free from falls and injuries, will continue to monitor.

## 2017-04-13 NOTE — ASSESSMENT & PLAN NOTE
BG goal 140-180 while hospitalized.  BG at or above goal   BG monitoring q4h  Increase transition 3.3 u/hr  Continue moderate dose correction scale 180/+2 Estimated Creatinine Clearance: 59.2 mL/min (based on Cr of 0.8).    DISCHARGE PLANNING - need discuss with pt A1c in DM range since 11/2016  Continue Metformin home dose (as long as kidney and liver function WNL on discharge)  Has Metformin and BG meter and supplies at home

## 2017-04-14 PROBLEM — I82.629 ACUTE DEEP VEIN THROMBOSIS (DVT) OF UPPER EXTREMITY: Status: ACTIVE | Noted: 2017-04-14

## 2017-04-14 PROBLEM — T81.49XA WOUND INFECTION AFTER SURGERY: Status: ACTIVE | Noted: 2017-04-14

## 2017-04-14 LAB
ALBUMIN SERPL BCP-MCNC: 2 G/DL
ALP SERPL-CCNC: 68 U/L
ALT SERPL W/O P-5'-P-CCNC: 13 U/L
ANION GAP SERPL CALC-SCNC: 7 MMOL/L
AST SERPL-CCNC: 22 U/L
BASOPHILS # BLD AUTO: 0.04 K/UL
BASOPHILS NFR BLD: 0.6 %
BILIRUB SERPL-MCNC: 0.3 MG/DL
BUN SERPL-MCNC: 10 MG/DL
CALCIUM SERPL-MCNC: 8.3 MG/DL
CHLORIDE SERPL-SCNC: 100 MMOL/L
CO2 SERPL-SCNC: 25 MMOL/L
CREAT SERPL-MCNC: 0.8 MG/DL
DIFFERENTIAL METHOD: ABNORMAL
EOSINOPHIL # BLD AUTO: 0.3 K/UL
EOSINOPHIL NFR BLD: 4.3 %
ERYTHROCYTE [DISTWIDTH] IN BLOOD BY AUTOMATED COUNT: 14.6 %
EST. GFR  (AFRICAN AMERICAN): >60 ML/MIN/1.73 M^2
EST. GFR  (NON AFRICAN AMERICAN): >60 ML/MIN/1.73 M^2
GLUCOSE SERPL-MCNC: 170 MG/DL
HCT VFR BLD AUTO: 24.5 %
HGB BLD-MCNC: 7.8 G/DL
INR PPP: 1
LYMPHOCYTES # BLD AUTO: 2 K/UL
LYMPHOCYTES NFR BLD: 31 %
MAGNESIUM SERPL-MCNC: 1.9 MG/DL
MCH RBC QN AUTO: 29.9 PG
MCHC RBC AUTO-ENTMCNC: 31.8 %
MCV RBC AUTO: 94 FL
MONOCYTES # BLD AUTO: 0.8 K/UL
MONOCYTES NFR BLD: 12.4 %
NEUTROPHILS # BLD AUTO: 3.3 K/UL
NEUTROPHILS NFR BLD: 51.7 %
PHOSPHATE SERPL-MCNC: 3.6 MG/DL
PLATELET # BLD AUTO: 244 K/UL
PMV BLD AUTO: 11.3 FL
POCT GLUCOSE: 110 MG/DL (ref 70–110)
POCT GLUCOSE: 174 MG/DL (ref 70–110)
POCT GLUCOSE: 187 MG/DL (ref 70–110)
POCT GLUCOSE: 204 MG/DL (ref 70–110)
POCT GLUCOSE: 222 MG/DL (ref 70–110)
POCT GLUCOSE: 254 MG/DL (ref 70–110)
POTASSIUM SERPL-SCNC: 4 MMOL/L
PROT SERPL-MCNC: 6.2 G/DL
PROTHROMBIN TIME: 10.8 SEC
RBC # BLD AUTO: 2.61 M/UL
SODIUM SERPL-SCNC: 132 MMOL/L
TB INDURATION 48 - 72 HR READ: 0 MM
WBC # BLD AUTO: 6.51 K/UL

## 2017-04-14 PROCEDURE — 25000003 PHARM REV CODE 250: Performed by: PHYSICIAN ASSISTANT

## 2017-04-14 PROCEDURE — 83735 ASSAY OF MAGNESIUM: CPT

## 2017-04-14 PROCEDURE — 63600175 PHARM REV CODE 636 W HCPCS: Performed by: SURGERY

## 2017-04-14 PROCEDURE — 25000003 PHARM REV CODE 250: Performed by: SURGERY

## 2017-04-14 PROCEDURE — 94664 DEMO&/EVAL PT USE INHALER: CPT

## 2017-04-14 PROCEDURE — 80053 COMPREHEN METABOLIC PANEL: CPT

## 2017-04-14 PROCEDURE — 63600175 PHARM REV CODE 636 W HCPCS: Performed by: NURSE PRACTITIONER

## 2017-04-14 PROCEDURE — C9113 INJ PANTOPRAZOLE SODIUM, VIA: HCPCS | Performed by: SURGERY

## 2017-04-14 PROCEDURE — 25000003 PHARM REV CODE 250: Performed by: NURSE PRACTITIONER

## 2017-04-14 PROCEDURE — 85025 COMPLETE CBC W/AUTO DIFF WBC: CPT

## 2017-04-14 PROCEDURE — 85610 PROTHROMBIN TIME: CPT

## 2017-04-14 PROCEDURE — 20600001 HC STEP DOWN PRIVATE ROOM

## 2017-04-14 PROCEDURE — 84100 ASSAY OF PHOSPHORUS: CPT

## 2017-04-14 PROCEDURE — 99232 SBSQ HOSP IP/OBS MODERATE 35: CPT | Mod: GC,,, | Performed by: INTERNAL MEDICINE

## 2017-04-14 PROCEDURE — 25000003 PHARM REV CODE 250: Performed by: STUDENT IN AN ORGANIZED HEALTH CARE EDUCATION/TRAINING PROGRAM

## 2017-04-14 RX ORDER — DIPHENHYDRAMINE HCL 25 MG
25 CAPSULE ORAL NIGHTLY PRN
Status: DISCONTINUED | OUTPATIENT
Start: 2017-04-14 | End: 2017-04-20 | Stop reason: HOSPADM

## 2017-04-14 RX ORDER — HYDROCODONE BITARTRATE AND ACETAMINOPHEN 5; 325 MG/1; MG/1
1 TABLET ORAL EVERY 6 HOURS PRN
Status: DISCONTINUED | OUTPATIENT
Start: 2017-04-14 | End: 2017-04-20 | Stop reason: HOSPADM

## 2017-04-14 RX ORDER — HYDROCODONE BITARTRATE AND ACETAMINOPHEN 10; 325 MG/1; MG/1
1 TABLET ORAL EVERY 6 HOURS PRN
Status: DISCONTINUED | OUTPATIENT
Start: 2017-04-14 | End: 2017-04-20 | Stop reason: HOSPADM

## 2017-04-14 RX ORDER — WARFARIN SODIUM 5 MG/1
5 TABLET ORAL DAILY
Status: DISCONTINUED | OUTPATIENT
Start: 2017-04-14 | End: 2017-04-14

## 2017-04-14 RX ADMIN — INSULIN ASPART 4 UNITS: 100 INJECTION, SOLUTION INTRAVENOUS; SUBCUTANEOUS at 09:04

## 2017-04-14 RX ADMIN — Medication 10 ML: at 01:04

## 2017-04-14 RX ADMIN — INSULIN ASPART 2 UNITS: 100 INJECTION, SOLUTION INTRAVENOUS; SUBCUTANEOUS at 04:04

## 2017-04-14 RX ADMIN — Medication 10 ML: at 05:04

## 2017-04-14 RX ADMIN — WARFARIN SODIUM 3 MG: 2 TABLET ORAL at 06:04

## 2017-04-14 RX ADMIN — Medication 10 ML: at 12:04

## 2017-04-14 RX ADMIN — INSULIN ASPART 4 UNITS: 100 INJECTION, SOLUTION INTRAVENOUS; SUBCUTANEOUS at 06:04

## 2017-04-14 RX ADMIN — Medication 3 ML: at 05:04

## 2017-04-14 RX ADMIN — INSULIN ASPART 2 UNITS: 100 INJECTION, SOLUTION INTRAVENOUS; SUBCUTANEOUS at 12:04

## 2017-04-14 RX ADMIN — HYDROCODONE BITARTRATE AND ACETAMINOPHEN 15 ML: 7.5; 325 SOLUTION ORAL at 01:04

## 2017-04-14 RX ADMIN — Medication 1 CAPSULE: at 09:04

## 2017-04-14 RX ADMIN — SODIUM CHLORIDE 3 UNITS/HR: 9 INJECTION, SOLUTION INTRAVENOUS at 05:04

## 2017-04-14 RX ADMIN — POLYETHYLENE GLYCOL 3350 17 G: 17 POWDER, FOR SOLUTION ORAL at 09:04

## 2017-04-14 RX ADMIN — ENOXAPARIN SODIUM 70 MG: 100 INJECTION SUBCUTANEOUS at 01:04

## 2017-04-14 RX ADMIN — CLINDAMYCIN IN 5 PERCENT DEXTROSE 900 MG: 18 INJECTION, SOLUTION INTRAVENOUS at 05:04

## 2017-04-14 RX ADMIN — INSULIN ASPART 2 UNITS: 100 INJECTION, SOLUTION INTRAVENOUS; SUBCUTANEOUS at 01:04

## 2017-04-14 RX ADMIN — PANTOPRAZOLE SODIUM 40 MG: 40 INJECTION, POWDER, FOR SOLUTION INTRAVENOUS at 09:04

## 2017-04-14 RX ADMIN — Medication 3 ML: at 10:04

## 2017-04-14 RX ADMIN — STANDARDIZED SENNA CONCENTRATE AND DOCUSATE SODIUM 1 TABLET: 8.6; 5 TABLET, FILM COATED ORAL at 09:04

## 2017-04-14 RX ADMIN — DIPHENHYDRAMINE HYDROCHLORIDE 12.5 MG: 50 INJECTION, SOLUTION INTRAMUSCULAR; INTRAVENOUS at 10:04

## 2017-04-14 NOTE — ASSESSMENT & PLAN NOTE
Lab Results   Component Value Date    TSH  FT4  0.319 (L)  1.22  02/06/2017     Home dose of synthroid 88 mcg   currently not on replacement   Will recheck labs and recommend replacement   To consider IV dose if unable to tolerate PO at this time

## 2017-04-14 NOTE — PLAN OF CARE
Problem: Patient Care Overview  Goal: Plan of Care Review  Outcome: Ongoing (interventions implemented as appropriate)  POC reviewed with pt. Pt AAOx4, VSS, afebrile, NSR on tele. Pt SpO2>92% on rm air. ML with staples - gauze for some drainage. Top of incision open, packed with gauze. Dressing changed during shift. Pt complains of mild pain, Prn meds given. G tube clamped, no complaints of nausea/vomiting. Gauze around gtube changed d/t moist drainage. TPN continued at 60. Acuchecks done q4h, insulin drip continued, coverage given per orders.Pt tolerating low fiber/low residue diet. Pt up to bedside commode with 1 person assist. BM x 1 during shift. Pt remains free from falls and injuries, will continue to monitor.

## 2017-04-14 NOTE — ASSESSMENT & PLAN NOTE
S/p ex lap, TIFFANY, G tube placement   G tube clamped  Advancing diet to diabetic diet  TPN halfed this morning. Will d/c tomorrow  Having bowel function

## 2017-04-14 NOTE — SUBJECTIVE & OBJECTIVE
"Interval HPI:   Overnight events:   BG at goal with Transition insulin  At 3.0 u/hr with additional correction ( about 14 u correction insulin)   Eatin%  Nausea: No  Hypoglycemia and intervention: No  Fever: No  TPN and/or TF: Yes  If yes, type of TF/TPN and rate: TPN 60    BP (!) 115/59 (BP Location: Left arm, Patient Position: Lying, BP Method: Automatic)  Pulse 77  Temp 97.3 °F (36.3 °C) (Oral)   Resp 17  Ht 5' 4" (1.626 m)  Wt 69.9 kg (154 lb)  LMP  (LMP Unknown)  SpO2 96%  Breastfeeding? No  BMI 26.43 kg/m2    Labs Reviewed and Include      Recent Labs  Lab 17  0400   *   CALCIUM 8.3*   ALBUMIN 2.0*   PROT 6.2   *   K 4.0   CO2 25      BUN 10   CREATININE 0.8   ALKPHOS 68   ALT 13   AST 22   BILITOT 0.3     Lab Results   Component Value Date    WBC 6.51 2017    HGB 7.8 (L) 2017    HCT 24.5 (L) 2017    MCV 94 2017     2017     No results for input(s): TSH, FREET4 in the last 168 hours.  Lab Results   Component Value Date    HGBA1C 6.6 (H) 2017       Nutritional status:   Body mass index is 26.43 kg/(m^2).  Lab Results   Component Value Date    ALBUMIN 2.0 (L) 2017    ALBUMIN 2.0 (L) 2017    ALBUMIN 1.9 (L) 2017     Lab Results   Component Value Date    PREALBUMIN 9 (L) 2017    PREALBUMIN 7 (L) 04/10/2017    PREALBUMIN 4 (L) 2017       Estimated Creatinine Clearance: 59.2 mL/min (based on Cr of 0.8).    Accu-Checks  Recent Labs      17   1853  17   2016  17   0008  17   0350  17   0859  17   1354  17   1800  17   2149  17   0006  17   0409   POCTGLUCOSE  213*  176*  216*  198*  241*  264*  228*  144*  222*  187*       Current Medications and/or Treatments Impacting Glycemic Control  Immunotherapy:  Immunosuppressants     None        Steroids:   Hormones     None        Pressors:    Autonomic Drugs     None        Hyperglycemia/Diabetes " Medications: Antihyperglycemics     Start     Stop Route Frequency Ordered    04/11/17 1800  insulin regular (Humulin R) 100 Units in sodium chloride 0.9% 100 mL infusion      -- IV Continuous 04/11/17 1647    04/12/17 1102  insulin aspart pen 0-10 Units      -- SubQ As needed (PRN) 04/12/17 1002    04/14/17 0715  insulin aspart pen 2-4 Units      -- SubQ 3 times daily with meals 04/13/17 0197

## 2017-04-14 NOTE — ASSESSMENT & PLAN NOTE
On insulin gtt, endo following  Appreciate assistance  TPN to be d/c tomorrow. Continue with diabetic diet

## 2017-04-14 NOTE — ASSESSMENT & PLAN NOTE
BG goal 140-180 while hospitalized.  BG near goal, with need for additional correction insulin Q4 hr   Increase transition 3.5 u/hr  Continue moderate dose correction scale 1  BG monitoring q4h    DISCHARGE PLANNING - As previously planned    need discuss with pt A1c in DM range since 11/2016  Continue Metformin home dose (as long as kidney and liver function WNL on discharge)  Has Metformin and BG meter and supplies at home

## 2017-04-14 NOTE — SUBJECTIVE & OBJECTIVE
Interval History:  NAEON. VSS. Tolerating diabetic. Diet. NO nausea or vomiting. Ambulating. Drain with minimal output overnight.     Medications:  Continuous Infusions:   insulin (HUMAN R) infusion (adults) 3 Units/hr (04/14/17 0528)    TPN ADULT CENTRAL LINE CUSTOM       Scheduled Meds:   clindamycin (CLEOCIN) IVPB  900 mg Intravenous Q8H    enoxaparin  1 mg/kg Subcutaneous Q12H    fat emulsion 20%  250 mL Intravenous Daily    insulin aspart  2-4 Units Subcutaneous TIDWM    Lactobacillus rhamnosus GG  1 capsule Oral Daily    pantoprazole  40 mg Intravenous Daily    polyethylene glycol  17 g Oral Daily    senna-docusate 8.6-50 mg  1 tablet Oral Daily    sodium chloride 0.9%  10 mL Intravenous Q6H    sodium chloride 0.9%  3 mL Intravenous Q8H    warfarin  5 mg Oral Daily     PRN Meds:cyclobenzaprine, dextrose 50%, dextrose 50%, diphenhydrAMINE, glucagon (human recombinant), glucose, glucose, hydrocodone-apap 2.5-108 MG/5 ML, hydrocodone-apap 2.5-108 MG/5 ML, insulin aspart, ondansetron, promethazine (PHENERGAN) IVPB, Flushing PICC Protocol **AND** sodium chloride 0.9% **AND** sodium chloride 0.9%     Review of patient's allergies indicates:   Allergen Reactions    Codeine Nausea And Vomiting     Other reaction(s): Itching    Percocet  [oxycodone-acetaminophen]      Other reaction(s): Itching    Sulfa (sulfonamide antibiotics)      Other reaction(s): Nausea  Other reaction(s): Itching    Adhesive Rash     Objective:     Vital Signs (Most Recent):  Temp: 97.3 °F (36.3 °C) (04/14/17 0359)  Pulse: 77 (04/14/17 0700)  Resp: 17 (04/14/17 0359)  BP: (!) 115/59 (04/14/17 0359)  SpO2: 96 % (04/14/17 0359) Vital Signs (24h Range):  Temp:  [97.3 °F (36.3 °C)-98.5 °F (36.9 °C)] 97.3 °F (36.3 °C)  Pulse:  [71-88] 77  Resp:  [16-18] 17  SpO2:  [95 %-98 %] 96 %  BP: (106-116)/(51-59) 115/59     Weight: 69.9 kg (154 lb)  Body mass index is 26.43 kg/(m^2).    Intake/Output - Last 3 Shifts       04/12 0700 - 04/13  0659 04/13 0700 - 04/14 0659 04/14 0700 - 04/15 0659    P.O. 1200 1080     I.V. (mL/kg) 78.5 (1.1) 76.8 (1.1)     NG/GT 40 0     IV Piggyback 150 100     TPN 1124 1111.6     Total Intake(mL/kg) 2592.5 (37.1) 2368.4 (33.9)     Urine (mL/kg/hr) 1925 (1.1) 1950 (1.2)     Drains 110 (0.1) 85 (0.1)     Other 0 (0)      Stool 0 (0) 0 (0)     Total Output 2035 2035      Net +557.5 +333.4             Urine Occurrence  3 x     Stool Occurrence 0 x 4 x           Physical Exam     Constitutional: She appears well-developed and well-nourished. No distress.   HENT:   Head: Normocephalic and atraumatic.   Cardiovascular: Normal rate and regular rhythm.   Pulmonary/Chest: Effort normal. No respiratory distress.   Abdominal:   Soft, no tenderness, incision - staples in place, clean dry and intact; incision opened at superior aspect, repacked with gauze this AM; no erythema or purlence.  G-tube in place clamped  Drain in place with serosanguinous output - d/c at bedside on rounds  Right arm swelling and erythema improved with no fluctuance or induration        Significant Labs:  CBC:   Recent Labs  Lab 04/14/17  0400   WBC 6.51   RBC 2.61*   HGB 7.8*   HCT 24.5*      MCV 94   MCH 29.9   MCHC 31.8*     CMP:   Recent Labs  Lab 04/14/17  0400   *   CALCIUM 8.3*   ALBUMIN 2.0*   PROT 6.2   *   K 4.0   CO2 25      BUN 10   CREATININE 0.8   ALKPHOS 68   ALT 13   AST 22   BILITOT 0.3       Significant Diagnostics:  None

## 2017-04-14 NOTE — PROGRESS NOTES
"[facility]  Endocrinology  Progress Note    Admit Date: 3/30/2017     Reason for Consult: Management of T2DM, Hyperglycemia     Surgical Procedure and Date: n/a    Diabetes diagnosis year: PreDM for a "few years" per family report     Home Diabetes Medications:  Metformin 750 mg BID     How often checking glucose at home? One   BG readings on regimen: <140  Hypoglycemia on the regimen?  No  Missed doses on regimen?  No      HPI:   Patient is a 72 y.o. female with a diagnosis of Pre-DM per yasmani's report. However, A1c upon admission 6.6% and has been in DM range since 2016. Followed by PCP.    Chronic medical conditions include: remote colon CA (s/p colon resection), SBO 2/2 adhesions, T2DM, GERD, fatty liver, HLD, and  hypothyroidism.     Patient presented to ED with abdominal discomfort. Admitted for treatment of SBO. Endocrine consulted for BG management.       Interval HPI:   Overnight events:   BG at goal with Transition insulin  At 3.0 u/hr with additional correction ( about 14 u correction insulin)   Eatin%  Nausea: No  Hypoglycemia and intervention: No  Fever: No  TPN and/or TF: Yes  If yes, type of TF/TPN and rate: TPN 60    BP (!) 115/59 (BP Location: Left arm, Patient Position: Lying, BP Method: Automatic)  Pulse 77  Temp 97.3 °F (36.3 °C) (Oral)   Resp 17  Ht 5' 4" (1.626 m)  Wt 69.9 kg (154 lb)  LMP  (LMP Unknown)  SpO2 96%  Breastfeeding? No  BMI 26.43 kg/m2    Labs Reviewed and Include      Recent Labs  Lab 17  0400   *   CALCIUM 8.3*   ALBUMIN 2.0*   PROT 6.2   *   K 4.0   CO2 25      BUN 10   CREATININE 0.8   ALKPHOS 68   ALT 13   AST 22   BILITOT 0.3     Lab Results   Component Value Date    WBC 6.51 2017    HGB 7.8 (L) 2017    HCT 24.5 (L) 2017    MCV 94 2017     2017     No results for input(s): TSH, FREET4 in the last 168 hours.  Lab Results   Component Value Date    HGBA1C 6.6 (H) 2017 "       Nutritional status:   Body mass index is 26.43 kg/(m^2).  Lab Results   Component Value Date    ALBUMIN 2.0 (L) 04/14/2017    ALBUMIN 2.0 (L) 04/13/2017    ALBUMIN 1.9 (L) 04/12/2017     Lab Results   Component Value Date    PREALBUMIN 9 (L) 04/13/2017    PREALBUMIN 7 (L) 04/10/2017    PREALBUMIN 4 (L) 04/06/2017       Estimated Creatinine Clearance: 59.2 mL/min (based on Cr of 0.8).    Accu-Checks  Recent Labs      04/12/17   1853  04/12/17   2016  04/13/17   0008  04/13/17   0350  04/13/17   0859  04/13/17   1354  04/13/17   1800  04/13/17   2149  04/14/17   0006  04/14/17   0409   POCTGLUCOSE  213*  176*  216*  198*  241*  264*  228*  144*  222*  187*       Current Medications and/or Treatments Impacting Glycemic Control  Immunotherapy:  Immunosuppressants     None        Steroids:   Hormones     None        Pressors:    Autonomic Drugs     None        Hyperglycemia/Diabetes Medications: Antihyperglycemics     Start     Stop Route Frequency Ordered    04/11/17 1800  insulin regular (Humulin R) 100 Units in sodium chloride 0.9% 100 mL infusion      -- IV Continuous 04/11/17 1647    04/12/17 1102  insulin aspart pen 0-10 Units      -- SubQ As needed (PRN) 04/12/17 1002    04/14/17 0715  insulin aspart pen 2-4 Units      -- SubQ 3 times daily with meals 04/13/17 2205          ASSESSMENT and PLAN    Type 2 diabetes mellitus without complication, without long-term current use of insulin  BG goal 140-180 while hospitalized.  BG near goal, with need for additional correction insulin Q4 hr   Increase transition 3.5 u/hr  Continue moderate dose correction scale 1  BG monitoring q4h    DISCHARGE PLANNING - As previously planned    need discuss with pt A1c in DM range since 11/2016  Continue Metformin home dose (as long as kidney and liver function WNL on discharge)  Has Metformin and BG meter and supplies at home     Hypothyroid  Lab Results   Component Value Date    TSH  FT4  0.319 (L)  1.22  02/06/2017     Home  dose of synthroid 88 mcg   currently not on replacement   Will recheck labs and recommend replacement   To consider IV levothyroxine 40 mcg ( half of oral dose)  if unable to tolerate PO at this time       * SBO (small bowel obstruction)  Per surgery, NPO on TPN      Sequelae of hyperalimentation  On Clinimix (15%) @ 60 ml/hr, increasing insulin needs        Leticia Lundberg DO  Endocrinology  [facility]

## 2017-04-14 NOTE — PROGRESS NOTES
[facility]  General Surgery  Progress Note    Subjective:     History of Present Illness: 73 yo female who presented with SBO. Now POD13 from Ex lap, TIFFANY and G tube placement.        Post-Op Info:  Procedure(s) (LRB):  EXPLORATORY-LAPAROTOMY (N/A)  REPAIR  LYSIS-ADHESION (N/A)  PLACEMENT   13 Days Post-Op     Interval History:  NAEON. VSS. Tolerating diabetic. Diet. NO nausea or vomiting. Ambulating. Drain with minimal output overnight.     Medications:  Continuous Infusions:   insulin (HUMAN R) infusion (adults) 3 Units/hr (04/14/17 0528)    TPN ADULT CENTRAL LINE CUSTOM       Scheduled Meds:   clindamycin (CLEOCIN) IVPB  900 mg Intravenous Q8H    enoxaparin  1 mg/kg Subcutaneous Q12H    fat emulsion 20%  250 mL Intravenous Daily    insulin aspart  2-4 Units Subcutaneous TIDWM    Lactobacillus rhamnosus GG  1 capsule Oral Daily    pantoprazole  40 mg Intravenous Daily    polyethylene glycol  17 g Oral Daily    senna-docusate 8.6-50 mg  1 tablet Oral Daily    sodium chloride 0.9%  10 mL Intravenous Q6H    sodium chloride 0.9%  3 mL Intravenous Q8H    warfarin  5 mg Oral Daily     PRN Meds:cyclobenzaprine, dextrose 50%, dextrose 50%, diphenhydrAMINE, glucagon (human recombinant), glucose, glucose, hydrocodone-apap 2.5-108 MG/5 ML, hydrocodone-apap 2.5-108 MG/5 ML, insulin aspart, ondansetron, promethazine (PHENERGAN) IVPB, Flushing PICC Protocol **AND** sodium chloride 0.9% **AND** sodium chloride 0.9%     Review of patient's allergies indicates:   Allergen Reactions    Codeine Nausea And Vomiting     Other reaction(s): Itching    Percocet  [oxycodone-acetaminophen]      Other reaction(s): Itching    Sulfa (sulfonamide antibiotics)      Other reaction(s): Nausea  Other reaction(s): Itching    Adhesive Rash     Objective:     Vital Signs (Most Recent):  Temp: 97.3 °F (36.3 °C) (04/14/17 0359)  Pulse: 77 (04/14/17 0700)  Resp: 17 (04/14/17 0359)  BP: (!) 115/59 (04/14/17  0359)  SpO2: 96 % (04/14/17 0359) Vital Signs (24h Range):  Temp:  [97.3 °F (36.3 °C)-98.5 °F (36.9 °C)] 97.3 °F (36.3 °C)  Pulse:  [71-88] 77  Resp:  [16-18] 17  SpO2:  [95 %-98 %] 96 %  BP: (106-116)/(51-59) 115/59     Weight: 69.9 kg (154 lb)  Body mass index is 26.43 kg/(m^2).    Intake/Output - Last 3 Shifts       04/12 0700 - 04/13 0659 04/13 0700 - 04/14 0659 04/14 0700 - 04/15 0659    P.O. 1200 1080     I.V. (mL/kg) 78.5 (1.1) 76.8 (1.1)     NG/GT 40 0     IV Piggyback 150 100     TPN 1124 1111.6     Total Intake(mL/kg) 2592.5 (37.1) 2368.4 (33.9)     Urine (mL/kg/hr) 1925 (1.1) 1950 (1.2)     Drains 110 (0.1) 85 (0.1)     Other 0 (0)      Stool 0 (0) 0 (0)     Total Output 2035 2035      Net +557.5 +333.4             Urine Occurrence  3 x     Stool Occurrence 0 x 4 x           Physical Exam     Constitutional: She appears well-developed and well-nourished. No distress.   HENT:   Head: Normocephalic and atraumatic.   Cardiovascular: Normal rate and regular rhythm.   Pulmonary/Chest: Effort normal. No respiratory distress.   Abdominal:   Soft, no tenderness, incision - staples in place, clean dry and intact; incision opened at superior aspect, repacked with gauze this AM; no erythema or purlence.  G-tube in place clamped  Drain in place with serosanguinous output - d/c at bedside on rounds  Right arm swelling and erythema improved with no fluctuance or induration        Significant Labs:  CBC:   Recent Labs  Lab 04/14/17  0400   WBC 6.51   RBC 2.61*   HGB 7.8*   HCT 24.5*      MCV 94   MCH 29.9   MCHC 31.8*     CMP:   Recent Labs  Lab 04/14/17  0400   *   CALCIUM 8.3*   ALBUMIN 2.0*   PROT 6.2   *   K 4.0   CO2 25      BUN 10   CREATININE 0.8   ALKPHOS 68   ALT 13   AST 22   BILITOT 0.3       Significant Diagnostics:  None    Assessment/Plan:     71 yo female with SBO s/p ex lap, TIFFANY and G tube placement      * SBO (small bowel obstruction)  S/p ex lap, TIFFANY, G tube placement   G tube  clamped  Advancing diet to diabetic diet  TPN halfed this morning. Will d/c tomorrow  Having bowel function     Acid reflux  Continue protonix     Hypothyroid  Continue home meds    Type 2 diabetes mellitus without complication, without long-term current use of insulin  On insulin gtt, endo following  Appreciate assistance  TPN to be d/c tomorrow. Continue with diabetic diet     Sequelae of hyperalimentation  Half TPN. Will d/c tomorrow now tolerating diet     DVT/GI ppx  PT/OT.     Dispo: recommending SNIF placement for decreased support at home. Will not have any IV needs      Larry Mc MD  General Surgery  [facility]

## 2017-04-15 PROBLEM — I82.629 ACUTE DEEP VEIN THROMBOSIS (DVT) OF UPPER EXTREMITY: Status: ACTIVE | Noted: 2017-04-15

## 2017-04-15 PROBLEM — T81.49XA WOUND INFECTION AFTER SURGERY: Status: ACTIVE | Noted: 2017-04-15

## 2017-04-15 PROBLEM — E08.21 DIABETES MELLITUS DUE TO UNDERLYING CONDITION WITH DIABETIC NEPHROPATHY, WITHOUT LONG-TERM CURRENT USE OF INSULIN: Status: RESOLVED | Noted: 2017-04-15 | Resolved: 2017-04-14

## 2017-04-15 PROBLEM — E83.42 HYPOMAGNESEMIA: Status: ACTIVE | Noted: 2017-04-15

## 2017-04-15 PROBLEM — E68 SEQUELAE OF HYPERALIMENTATION: Status: RESOLVED | Noted: 2017-04-11 | Resolved: 2017-04-15

## 2017-04-15 LAB
ALBUMIN SERPL BCP-MCNC: 2.1 G/DL
ALP SERPL-CCNC: 73 U/L
ALT SERPL W/O P-5'-P-CCNC: 16 U/L
ANION GAP SERPL CALC-SCNC: 9 MMOL/L
AST SERPL-CCNC: 23 U/L
BASOPHILS # BLD AUTO: 0.05 K/UL
BASOPHILS NFR BLD: 0.7 %
BILIRUB SERPL-MCNC: 0.5 MG/DL
BUN SERPL-MCNC: 9 MG/DL
CALCIUM SERPL-MCNC: 8.1 MG/DL
CHLORIDE SERPL-SCNC: 102 MMOL/L
CO2 SERPL-SCNC: 22 MMOL/L
CREAT SERPL-MCNC: 0.8 MG/DL
DIFFERENTIAL METHOD: ABNORMAL
EOSINOPHIL # BLD AUTO: 0.2 K/UL
EOSINOPHIL NFR BLD: 2.8 %
ERYTHROCYTE [DISTWIDTH] IN BLOOD BY AUTOMATED COUNT: 14.9 %
EST. GFR  (AFRICAN AMERICAN): >60 ML/MIN/1.73 M^2
EST. GFR  (NON AFRICAN AMERICAN): >60 ML/MIN/1.73 M^2
GLUCOSE SERPL-MCNC: 147 MG/DL
HCT VFR BLD AUTO: 23.9 %
HGB BLD-MCNC: 8 G/DL
INR PPP: 1
LYMPHOCYTES # BLD AUTO: 2.2 K/UL
LYMPHOCYTES NFR BLD: 31.8 %
MAGNESIUM SERPL-MCNC: 1.5 MG/DL
MCH RBC QN AUTO: 30.4 PG
MCHC RBC AUTO-ENTMCNC: 33.5 %
MCV RBC AUTO: 91 FL
MONOCYTES # BLD AUTO: 0.9 K/UL
MONOCYTES NFR BLD: 12.8 %
NEUTROPHILS # BLD AUTO: 3.5 K/UL
NEUTROPHILS NFR BLD: 51.3 %
PHOSPHATE SERPL-MCNC: 3.7 MG/DL
PLATELET # BLD AUTO: 279 K/UL
PMV BLD AUTO: 11.2 FL
POCT GLUCOSE: 109 MG/DL (ref 70–110)
POCT GLUCOSE: 124 MG/DL (ref 70–110)
POCT GLUCOSE: 156 MG/DL (ref 70–110)
POCT GLUCOSE: 165 MG/DL (ref 70–110)
POCT GLUCOSE: 188 MG/DL (ref 70–110)
POTASSIUM SERPL-SCNC: 4.2 MMOL/L
PROT SERPL-MCNC: 6.3 G/DL
PROTHROMBIN TIME: 10.9 SEC
RBC # BLD AUTO: 2.63 M/UL
SODIUM SERPL-SCNC: 133 MMOL/L
T4 FREE SERPL-MCNC: 0.47 NG/DL
TSH SERPL DL<=0.005 MIU/L-ACNC: 23.09 UIU/ML
WBC # BLD AUTO: 6.82 K/UL

## 2017-04-15 PROCEDURE — C9113 INJ PANTOPRAZOLE SODIUM, VIA: HCPCS | Performed by: SURGERY

## 2017-04-15 PROCEDURE — 83735 ASSAY OF MAGNESIUM: CPT

## 2017-04-15 PROCEDURE — 80053 COMPREHEN METABOLIC PANEL: CPT

## 2017-04-15 PROCEDURE — 63600175 PHARM REV CODE 636 W HCPCS: Performed by: SURGERY

## 2017-04-15 PROCEDURE — 25000003 PHARM REV CODE 250: Performed by: PHYSICIAN ASSISTANT

## 2017-04-15 PROCEDURE — 25000003 PHARM REV CODE 250: Performed by: STUDENT IN AN ORGANIZED HEALTH CARE EDUCATION/TRAINING PROGRAM

## 2017-04-15 PROCEDURE — 84443 ASSAY THYROID STIM HORMONE: CPT

## 2017-04-15 PROCEDURE — 94760 N-INVAS EAR/PLS OXIMETRY 1: CPT

## 2017-04-15 PROCEDURE — 99232 SBSQ HOSP IP/OBS MODERATE 35: CPT | Mod: ,,, | Performed by: NURSE PRACTITIONER

## 2017-04-15 PROCEDURE — 85025 COMPLETE CBC W/AUTO DIFF WBC: CPT

## 2017-04-15 PROCEDURE — 20600001 HC STEP DOWN PRIVATE ROOM

## 2017-04-15 PROCEDURE — 94664 DEMO&/EVAL PT USE INHALER: CPT

## 2017-04-15 PROCEDURE — 25000003 PHARM REV CODE 250: Performed by: SURGERY

## 2017-04-15 PROCEDURE — 84100 ASSAY OF PHOSPHORUS: CPT

## 2017-04-15 PROCEDURE — 99900035 HC TECH TIME PER 15 MIN (STAT)

## 2017-04-15 PROCEDURE — 84439 ASSAY OF FREE THYROXINE: CPT

## 2017-04-15 PROCEDURE — 85610 PROTHROMBIN TIME: CPT

## 2017-04-15 RX ORDER — IBUPROFEN 200 MG
24 TABLET ORAL
Status: DISCONTINUED | OUTPATIENT
Start: 2017-04-15 | End: 2017-04-17

## 2017-04-15 RX ORDER — INSULIN ASPART 100 [IU]/ML
0-5 INJECTION, SOLUTION INTRAVENOUS; SUBCUTANEOUS
Status: DISCONTINUED | OUTPATIENT
Start: 2017-04-15 | End: 2017-04-17

## 2017-04-15 RX ORDER — INSULIN ASPART 100 [IU]/ML
3 INJECTION, SOLUTION INTRAVENOUS; SUBCUTANEOUS
Status: DISCONTINUED | OUTPATIENT
Start: 2017-04-15 | End: 2017-04-17

## 2017-04-15 RX ORDER — GLUCAGON 1 MG
1 KIT INJECTION
Status: DISCONTINUED | OUTPATIENT
Start: 2017-04-15 | End: 2017-04-17

## 2017-04-15 RX ORDER — WARFARIN SODIUM 5 MG/1
5 TABLET ORAL DAILY
Status: DISCONTINUED | OUTPATIENT
Start: 2017-04-15 | End: 2017-04-17

## 2017-04-15 RX ORDER — LEVOTHYROXINE SODIUM 88 UG/1
88 TABLET ORAL
Status: DISCONTINUED | OUTPATIENT
Start: 2017-04-16 | End: 2017-04-20 | Stop reason: HOSPADM

## 2017-04-15 RX ORDER — PANTOPRAZOLE SODIUM 40 MG/1
40 TABLET, DELAYED RELEASE ORAL DAILY
Status: DISCONTINUED | OUTPATIENT
Start: 2017-04-15 | End: 2017-04-20 | Stop reason: HOSPADM

## 2017-04-15 RX ORDER — LANOLIN ALCOHOL/MO/W.PET/CERES
400 CREAM (GRAM) TOPICAL ONCE
Status: COMPLETED | OUTPATIENT
Start: 2017-04-15 | End: 2017-04-15

## 2017-04-15 RX ORDER — IBUPROFEN 200 MG
16 TABLET ORAL
Status: DISCONTINUED | OUTPATIENT
Start: 2017-04-15 | End: 2017-04-17

## 2017-04-15 RX ORDER — LANOLIN ALCOHOL/MO/W.PET/CERES
400 CREAM (GRAM) TOPICAL DAILY
Status: DISCONTINUED | OUTPATIENT
Start: 2017-04-15 | End: 2017-04-15

## 2017-04-15 RX ORDER — INSULIN ASPART 100 [IU]/ML
3 INJECTION, SOLUTION INTRAVENOUS; SUBCUTANEOUS
Status: DISCONTINUED | OUTPATIENT
Start: 2017-04-15 | End: 2017-04-15

## 2017-04-15 RX ADMIN — Medication 10 ML: at 12:04

## 2017-04-15 RX ADMIN — DIPHENHYDRAMINE HYDROCHLORIDE 25 MG: 25 CAPSULE ORAL at 10:04

## 2017-04-15 RX ADMIN — Medication 10 ML: at 06:04

## 2017-04-15 RX ADMIN — INSULIN ASPART 3 UNITS: 100 INJECTION, SOLUTION INTRAVENOUS; SUBCUTANEOUS at 12:04

## 2017-04-15 RX ADMIN — POLYETHYLENE GLYCOL 3350 17 G: 17 POWDER, FOR SOLUTION ORAL at 08:04

## 2017-04-15 RX ADMIN — MAGNESIUM OXIDE TAB 400 MG (241.3 MG ELEMENTAL MG) 400 MG: 400 (241.3 MG) TAB at 11:04

## 2017-04-15 RX ADMIN — Medication 1 CAPSULE: at 08:04

## 2017-04-15 RX ADMIN — HYDROCODONE BITARTRATE AND ACETAMINOPHEN 1 TABLET: 10; 325 TABLET ORAL at 11:04

## 2017-04-15 RX ADMIN — Medication 10 ML: at 11:04

## 2017-04-15 RX ADMIN — ENOXAPARIN SODIUM 70 MG: 100 INJECTION SUBCUTANEOUS at 11:04

## 2017-04-15 RX ADMIN — INSULIN ASPART 4 UNITS: 100 INJECTION, SOLUTION INTRAVENOUS; SUBCUTANEOUS at 08:04

## 2017-04-15 RX ADMIN — PANTOPRAZOLE SODIUM 40 MG: 40 INJECTION, POWDER, FOR SOLUTION INTRAVENOUS at 08:04

## 2017-04-15 RX ADMIN — HYDROCODONE BITARTRATE AND ACETAMINOPHEN 1 TABLET: 5; 325 TABLET ORAL at 03:04

## 2017-04-15 RX ADMIN — STANDARDIZED SENNA CONCENTRATE AND DOCUSATE SODIUM 1 TABLET: 8.6; 5 TABLET, FILM COATED ORAL at 08:04

## 2017-04-15 RX ADMIN — WARFARIN SODIUM 5 MG: 5 TABLET ORAL at 06:04

## 2017-04-15 RX ADMIN — Medication 3 ML: at 02:04

## 2017-04-15 RX ADMIN — HYDROCODONE BITARTRATE AND ACETAMINOPHEN 1 TABLET: 5; 325 TABLET ORAL at 10:04

## 2017-04-15 RX ADMIN — Medication 3 ML: at 10:04

## 2017-04-15 RX ADMIN — Medication 3 ML: at 06:04

## 2017-04-15 RX ADMIN — ENOXAPARIN SODIUM 70 MG: 100 INJECTION SUBCUTANEOUS at 12:04

## 2017-04-15 NOTE — ASSESSMENT & PLAN NOTE
S/p ex lap, TIFFANY, G tube placement   G tube clamped  On diabetic diet/low residue diet    TPN discontinued yesterday  Having bowel function. Continue bowel regimen - miralax and senna/colace daily  Prn PO pain meds

## 2017-04-15 NOTE — PROGRESS NOTES
[facility]  General Surgery  Progress Note    Subjective:     History of Present Illness:   71 yo female who presented with SBO. Now POD14 from Ex lap, TIFFANY and G tube placement.     Post-Op Info:  Procedure(s) (LRB):  EXPLORATORY-LAPAROTOMY (N/A)  REPAIR  LYSIS-ADHESION (N/A)  PLACEMENT   14 Days Post-Op     Interval History: NAEON. Pain controlled. VSS. G tube clamped. Tolerating a diabetic diet. TPN discontinued. Now off insulin gtt.     Medications:  Continuous Infusions:   insulin (HUMAN R) infusion (adults) Stopped (04/14/17 2300)     Scheduled Meds:   enoxaparin  1 mg/kg Subcutaneous Q12H    insulin aspart  2-4 Units Subcutaneous TIDWM    pantoprazole  40 mg Intravenous Daily    polyethylene glycol  17 g Oral Daily    senna-docusate 8.6-50 mg  1 tablet Oral Daily    sodium chloride 0.9%  10 mL Intravenous Q6H    sodium chloride 0.9%  3 mL Intravenous Q8H    warfarin  5 mg Oral Daily     PRN Meds:cyclobenzaprine, dextrose 50%, dextrose 50%, diphenhydrAMINE, diphenhydrAMINE, glucagon (human recombinant), glucose, glucose, hydrocodone-acetaminophen 10-325mg, hydrocodone-acetaminophen 5-325mg, insulin aspart, ondansetron, promethazine (PHENERGAN) IVPB, Flushing PICC Protocol **AND** sodium chloride 0.9% **AND** sodium chloride 0.9%     Review of patient's allergies indicates:   Allergen Reactions    Codeine Nausea And Vomiting     Other reaction(s): Itching    Percocet  [oxycodone-acetaminophen]      Other reaction(s): Itching    Sulfa (sulfonamide antibiotics)      Other reaction(s): Nausea  Other reaction(s): Itching    Adhesive Rash     Objective:     Vital Signs (Most Recent):  Temp: 97.5 °F (36.4 °C) (04/15/17 0809)  Pulse: 78 (04/15/17 0809)  Resp: 18 (04/15/17 0809)  BP: 130/65 (04/15/17 0809)  SpO2: 97 % (04/15/17 0809) Vital Signs (24h Range):  Temp:  [97.2 °F (36.2 °C)-98.1 °F (36.7 °C)] 97.5 °F (36.4 °C)  Pulse:  [78-84] 78  Resp:  [16-18] 18  SpO2:  [94 %-98 %] 97  %  BP: (118-130)/(56-65) 130/65     Weight: 69.9 kg (154 lb)  Body mass index is 26.43 kg/(m^2).    Intake/Output - Last 3 Shifts       04/13 0700 - 04/14 0659 04/14 0700 - 04/15 0659 04/15 0700 - 04/16 0659    P.O. 1080 480     I.V. (mL/kg) 76.8 (1.1) 20 (0.3)     NG/GT 0 40     IV Piggyback 100      TPN 1111.6 405     Total Intake(mL/kg) 2368.4 (33.9) 945 (13.5)     Urine (mL/kg/hr) 1950 (1.2) 750 (0.4)     Drains 85 (0.1)      Other       Stool 0 (0) 0 (0)     Total Output 2035 750      Net +333.4 +195             Urine Occurrence 3 x 4 x     Stool Occurrence 4 x 4 x           Physical Exam   Constitutional: She appears well-developed and well-nourished. No distress.   HENT:   Head: Normocephalic and atraumatic.   Cardiovascular: Normal rate and regular rhythm.   Pulmonary/Chest: Effort normal. No respiratory distress.   Abdominal:   Soft, no tenderness, incision - staples in place, clean dry and intact; incision opened at superior aspect, repacked with gauze this AM; no erythema or purlence.  G-tube in place clamped  Right arm swelling and erythema stable with no fluctuance or induration     Significant Labs:  CBC:   Recent Labs  Lab 04/15/17  0332   WBC 6.82   RBC 2.63*   HGB 8.0*   HCT 23.9*      MCV 91   MCH 30.4   MCHC 33.5     CMP:   Recent Labs  Lab 04/15/17  0332   *   CALCIUM 8.1*   ALBUMIN 2.1*   PROT 6.3   *   K 4.2   CO2 22*      BUN 9   CREATININE 0.8   ALKPHOS 73   ALT 16   AST 23   BILITOT 0.5     Coagulation:   Recent Labs  Lab 04/15/17  0332   INR 1.0       Significant Diagnostics:  None    Assessment/Plan:     * SBO (small bowel obstruction)  S/p ex lap, TIFFANY, G tube placement   G tube clamped  On diabetic diet/low residue diet    TPN discontinued yesterday  Having bowel function. Continue bowel regimen - miralax and senna/colace daily  Prn PO pain meds     Acid reflux  Convert IV to PO protonix     Hypothyroid  Continue home meds  Endo following, appreciate  assistance    Type 2 diabetes mellitus without complication, without long-term current use of insulin  Off insulin gtt. Endo following.  Appreciate assistance  Continue with diabetic diet     Sequelae of hyperalimentation  TPN discontinued yesterday. Now tolerating diabetic diet    Acute deep vein thrombosis (DVT) of upper extremity  On therapeutic lovenox  Increased to 5mg coumadin  Trend INR. INR 1.0 this AM     Wound infection after surgery  S/p opening incision. Erythema resolved  Continuing daily packing changes   No abx   Afebrile, no leukocytosis     Hypomagnesemia  Replace today       Dispo: ready for transfer to SNIF. Awaiting placement, possibly Monday?     Larry Mc MD  General Surgery  [facility]

## 2017-04-15 NOTE — PLAN OF CARE
Problem: Diabetes, Type 2 (Adult)  Goal: Signs and Symptoms of Listed Potential Problems Will be Absent, Minimized or Managed (Diabetes, Type 2)  Signs and symptoms of listed potential problems will be absent, minimized or managed by discharge/transition of care (reference Diabetes, Type 2 (Adult) CPG).   Insulin administered during shift.     Problem: Fall Risk (Adult)  Goal: Absence of Falls  Patient will demonstrate the desired outcomes by discharge/transition of care.   No falls during this shift.     Problem: Pressure Ulcer Risk (Ean Scale) (Adult,Obstetrics,Pediatric)  Goal: Identify Related Risk Factors and Signs and Symptoms  Related risk factors and signs and symptoms are identified upon initiation of Human Response Clinical Practice Guideline (CPG)   Pt repositions self in bed independently.

## 2017-04-15 NOTE — PROGRESS NOTES
Drainage around G-tube site, dressing changed and site assessed. Appears that the stitch is likely to break. Changed dressing with gauze and tape. Secured tube to abdominal with tape. Informed Surgical team. MD aware. (Dr. SUSHIL Martinez) Agreed with this method. Contact if status changes.  Will continue to monitor.

## 2017-04-15 NOTE — SUBJECTIVE & OBJECTIVE
Interval History: NAEON. Pain controlled. VSS. G tube clamped. Tolerating a diabetic diet. TPN discontinued. Now off insulin gtt.     Medications:  Continuous Infusions:   insulin (HUMAN R) infusion (adults) Stopped (04/14/17 2300)     Scheduled Meds:   enoxaparin  1 mg/kg Subcutaneous Q12H    insulin aspart  2-4 Units Subcutaneous TIDWM    pantoprazole  40 mg Intravenous Daily    polyethylene glycol  17 g Oral Daily    senna-docusate 8.6-50 mg  1 tablet Oral Daily    sodium chloride 0.9%  10 mL Intravenous Q6H    sodium chloride 0.9%  3 mL Intravenous Q8H    warfarin  5 mg Oral Daily     PRN Meds:cyclobenzaprine, dextrose 50%, dextrose 50%, diphenhydrAMINE, diphenhydrAMINE, glucagon (human recombinant), glucose, glucose, hydrocodone-acetaminophen 10-325mg, hydrocodone-acetaminophen 5-325mg, insulin aspart, ondansetron, promethazine (PHENERGAN) IVPB, Flushing PICC Protocol **AND** sodium chloride 0.9% **AND** sodium chloride 0.9%     Review of patient's allergies indicates:   Allergen Reactions    Codeine Nausea And Vomiting     Other reaction(s): Itching    Percocet  [oxycodone-acetaminophen]      Other reaction(s): Itching    Sulfa (sulfonamide antibiotics)      Other reaction(s): Nausea  Other reaction(s): Itching    Adhesive Rash     Objective:     Vital Signs (Most Recent):  Temp: 97.5 °F (36.4 °C) (04/15/17 0809)  Pulse: 78 (04/15/17 0809)  Resp: 18 (04/15/17 0809)  BP: 130/65 (04/15/17 0809)  SpO2: 97 % (04/15/17 0809) Vital Signs (24h Range):  Temp:  [97.2 °F (36.2 °C)-98.1 °F (36.7 °C)] 97.5 °F (36.4 °C)  Pulse:  [78-84] 78  Resp:  [16-18] 18  SpO2:  [94 %-98 %] 97 %  BP: (118-130)/(56-65) 130/65     Weight: 69.9 kg (154 lb)  Body mass index is 26.43 kg/(m^2).    Intake/Output - Last 3 Shifts       04/13 0700 - 04/14 0659 04/14 0700 - 04/15 0659 04/15 0700 - 04/16 0659    P.O. 1080 480     I.V. (mL/kg) 76.8 (1.1) 20 (0.3)     NG/GT 0 40     IV Piggyback 100      TPN 1111.6 405     Total  Intake(mL/kg) 2368.4 (33.9) 945 (13.5)     Urine (mL/kg/hr) 1950 (1.2) 750 (0.4)     Drains 85 (0.1)      Other       Stool 0 (0) 0 (0)     Total Output 2035 750      Net +333.4 +195             Urine Occurrence 3 x 4 x     Stool Occurrence 4 x 4 x           Physical Exam   Constitutional: She appears well-developed and well-nourished. No distress.   HENT:   Head: Normocephalic and atraumatic.   Cardiovascular: Normal rate and regular rhythm.   Pulmonary/Chest: Effort normal. No respiratory distress.   Abdominal:   Soft, no tenderness, incision - staples in place, clean dry and intact; incision opened at superior aspect, repacked with gauze this AM; no erythema or purlence.  G-tube in place clamped  Right arm swelling and erythema stable with no fluctuance or induration     Significant Labs:  CBC:   Recent Labs  Lab 04/15/17  0332   WBC 6.82   RBC 2.63*   HGB 8.0*   HCT 23.9*      MCV 91   MCH 30.4   MCHC 33.5     CMP:   Recent Labs  Lab 04/15/17  0332   *   CALCIUM 8.1*   ALBUMIN 2.1*   PROT 6.3   *   K 4.2   CO2 22*      BUN 9   CREATININE 0.8   ALKPHOS 73   ALT 16   AST 23   BILITOT 0.5     Coagulation:   Recent Labs  Lab 04/15/17  0332   INR 1.0       Significant Diagnostics:  None

## 2017-04-15 NOTE — ASSESSMENT & PLAN NOTE
Lab Results   Component Value Date    TSH 23.093 (H) 04/15/2017     Currently not on replacement since admission, restart synthroid 88 mcg PO daily.

## 2017-04-15 NOTE — PROGRESS NOTES
Endocrinology ( Dr. Lundberg) said to stop insulin drip with TPN being DC. Monitor glucose and use aspart flexpen coverage. Contact Endocrinology if glucose is >180. Possible drip restart. Will continue to monitor.

## 2017-04-15 NOTE — PROGRESS NOTES
Herman Durham MD on call for . MD made aware of green purulent appearing drainage around g tube and midline incision dehiscence site. Also, MD made aware of moderate amount of serosangeous drainage from previous incision site around right lower abdominal area. No new orders at this time. Will continue to monitor closely.

## 2017-04-15 NOTE — PLAN OF CARE
Problem: Patient Care Overview  Goal: Plan of Care Review  Outcome: Ongoing (interventions implemented as appropriate)  POC reviewed with patient, who verbalized understanding. AAOx4. Remains free of falls and injury. VSS. Abdominal ML ROSANNE, with exception of top of incision open and packed with gauze. KOURTNEY drain was removed and gauze and tape in place, area was redressed after saturated. G tube, clamped, sutures may fail soon, MD aware, tape securing tube, split gauze in place and changed PRN. Tolerating ADA 1800 diet. Denies nausea and pain is mild and controlled with PRN medication. TPN was discontinued, Insulin drip was stopped per verbal order from Dr. Lundberg on Endo. Voiding per BC. x1 BM. Walks with walker. Blood Glucose monitored q4. Teds refused, SCDs in place. No acute events. No distress noted. Call bell in reach. Will continue to monitor.

## 2017-04-15 NOTE — ASSESSMENT & PLAN NOTE
S/p opening incision. Erythema resolved  Continuing daily packing changes   No abx   Afebrile, no leukocytosis

## 2017-04-15 NOTE — PROGRESS NOTES
"[facility]  Endocrinology  Progress Note    Admit Date: 3/30/2017     Reason for Consult: Management of T2DM, Hyperglycemia     Surgical Procedure and Date: n/a    Diabetes diagnosis year: PreDM for a "few years" per family report, DM on labs since 2016    Home Diabetes Medications:  Metformin 750 mg BID     How often checking glucose at home? One   BG readings on regimen: <140  Hypoglycemia on the regimen?  No  Missed doses on regimen?  No      HPI:   Patient is a 72 y.o. female with a diagnosis of Pre-DM per elysether's report. However, A1c upon admission 6.6% and has been in DM range since 2016. Followed by PCP.    Chronic medical conditions include: remote colon CA (s/p colon resection), SBO 2/2 adhesions, T2DM, GERD, fatty liver, HLD, and  hypothyroidism.     Patient presented to ED with abdominal discomfort. Admitted for treatment of SBO. Endocrine consulted for BG management.       Interval HPI:   Overnight events: TPN discontinued yesterday, insulin stopped per protocol. Per primary team, awaiting SNF transfer, possible Monday ?  Eatin%, "nibbling on food", no appetite   Nausea: Yes intermittently  Hypoglycemia and intervention: No  Fever: No  TPN and/or TF: No  If yes, type of TF/TPN and rate:     /65 (BP Location: Left arm, Patient Position: Sitting, BP Method: Automatic)  Pulse 78  Temp 97.5 °F (36.4 °C) (Oral)   Resp 18  Ht 5' 4" (1.626 m)  Wt 69.9 kg (154 lb)  LMP  (LMP Unknown)  SpO2 97%  Breastfeeding? No  BMI 26.43 kg/m2    Labs Reviewed and Include      Recent Labs  Lab 04/15/17  0332   *   CALCIUM 8.1*   ALBUMIN 2.1*   PROT 6.3   *   K 4.2   CO2 22*      BUN 9   CREATININE 0.8   ALKPHOS 73   ALT 16   AST 23   BILITOT 0.5     Lab Results   Component Value Date    WBC 6.82 04/15/2017    HGB 8.0 (L) 04/15/2017    HCT 23.9 (L) 04/15/2017    MCV 91 04/15/2017     04/15/2017       Recent Labs  Lab 04/15/17  0332   TSH 23.093* "   FREET4 0.47*     Lab Results   Component Value Date    HGBA1C 6.6 (H) 03/30/2017       Nutritional status:   Body mass index is 26.43 kg/(m^2).  Lab Results   Component Value Date    ALBUMIN 2.1 (L) 04/15/2017    ALBUMIN 2.0 (L) 04/14/2017    ALBUMIN 2.0 (L) 04/13/2017     Lab Results   Component Value Date    PREALBUMIN 9 (L) 04/13/2017    PREALBUMIN 7 (L) 04/10/2017    PREALBUMIN 4 (L) 04/06/2017       Estimated Creatinine Clearance: 59.2 mL/min (based on Cr of 0.8).    Accu-Checks  Recent Labs      04/13/17   2149  04/14/17   0006  04/14/17   0409  04/14/17   0931  04/14/17   1305  04/14/17   1651  04/14/17   2231  04/15/17   0039  04/15/17   0358  04/15/17   0805   POCTGLUCOSE  144*  222*  187*  254*  204*  174*  110  124*  165*  188*       Current Medications and/or Treatments Impacting Glycemic Control  Immunotherapy:  Immunosuppressants     None        Steroids:   Hormones     None        Pressors:    Autonomic Drugs     None        Hyperglycemia/Diabetes Medications: Antihyperglycemics     Start     Stop Route Frequency Ordered    04/11/17 1800  insulin regular (Humulin R) 100 Units in sodium chloride 0.9% 100 mL infusion      -- IV Continuous 04/11/17 1647    04/12/17 1102  insulin aspart pen 0-10 Units      -- SubQ As needed (PRN) 04/12/17 1002    04/14/17 0715  insulin aspart pen 2-4 Units      -- SubQ 3 times daily with meals 04/13/17 2205          ASSESSMENT and PLAN    Type 2 diabetes mellitus without complication, without long-term current use of insulin  BG goal 140-180 while hospitalized. BG near goal, d/c insulin infusion orders since TPN discontinued.Change BG to ac/hs with low dose correction scale. Change Novolog to 3 units if patients eats 25% or more.   Pt with poor appetite, encouraged to drink Boost shakes if she cannot eat solid food    DISCHARGE PLANNING - Discussed with pt and daughter A1c in DM range since 11/2016. Continue Metformin home dose (as long as kidney and liver function WNL  on discharge). Has Metformin and BG meter and supplies at home     * SBO (small bowel obstruction)  Per surgery      History of colon cancer  Noted       Hypothyroid    Lab Results   Component Value Date    TSH 23.093 (H) 04/15/2017     Currently not on replacement since admission, restart synthroid 88 mcg PO daily.       Sequelae of hyperalimentation  Resolved, TPN discontinued overnight       Bety Jimenez NP  Endocrinology  [facility]

## 2017-04-15 NOTE — ASSESSMENT & PLAN NOTE
BG goal 140-180 while hospitalized. BG near goal, d/c insulin infusion orders since TPN discontinued.Change BG to ac/hs with low dose correction scale. Change Novolog to 3 units if patients eats 25% or more.   Pt with poor appetite, encouraged to drink Boost shakes if she cannot eat solid food    DISCHARGE PLANNING - Discussed with pt and daughter A1c in DM range since 11/2016. Continue Metformin home dose (as long as kidney and liver function WNL on discharge). Has Metformin and BG meter and supplies at home

## 2017-04-15 NOTE — SUBJECTIVE & OBJECTIVE
"Interval HPI:   Overnight events: TPN discontinued yesterday, insulin stopped per protocol. Per primary team, awaiting SNF transfer, possible Monday ?  Eatin%, "nibbling on food", no appetite   Nausea: Yes intermittently  Hypoglycemia and intervention: No  Fever: No  TPN and/or TF: No  If yes, type of TF/TPN and rate:     /65 (BP Location: Left arm, Patient Position: Sitting, BP Method: Automatic)  Pulse 78  Temp 97.5 °F (36.4 °C) (Oral)   Resp 18  Ht 5' 4" (1.626 m)  Wt 69.9 kg (154 lb)  LMP  (LMP Unknown)  SpO2 97%  Breastfeeding? No  BMI 26.43 kg/m2    Labs Reviewed and Include      Recent Labs  Lab 04/15/17  0332   *   CALCIUM 8.1*   ALBUMIN 2.1*   PROT 6.3   *   K 4.2   CO2 22*      BUN 9   CREATININE 0.8   ALKPHOS 73   ALT 16   AST 23   BILITOT 0.5     Lab Results   Component Value Date    WBC 6.82 04/15/2017    HGB 8.0 (L) 04/15/2017    HCT 23.9 (L) 04/15/2017    MCV 91 04/15/2017     04/15/2017       Recent Labs  Lab 04/15/17  0332   TSH 23.093*   FREET4 0.47*     Lab Results   Component Value Date    HGBA1C 6.6 (H) 2017       Nutritional status:   Body mass index is 26.43 kg/(m^2).  Lab Results   Component Value Date    ALBUMIN 2.1 (L) 04/15/2017    ALBUMIN 2.0 (L) 2017    ALBUMIN 2.0 (L) 2017     Lab Results   Component Value Date    PREALBUMIN 9 (L) 2017    PREALBUMIN 7 (L) 04/10/2017    PREALBUMIN 4 (L) 2017       Estimated Creatinine Clearance: 59.2 mL/min (based on Cr of 0.8).    Accu-Checks  Recent Labs      17   2149  17   0006  17   0409  17   0931  17   1305  17   1651  17   2231  04/15/17   0039  04/15/17   0358  04/15/17   0805   POCTGLUCOSE  144*  222*  187*  254*  204*  174*  110  124*  165*  188*       Current Medications and/or Treatments Impacting Glycemic Control  Immunotherapy:  Immunosuppressants     None        Steroids:   Hormones     None        Pressors:    Autonomic " Drugs     None        Hyperglycemia/Diabetes Medications: Antihyperglycemics     Start     Stop Route Frequency Ordered    04/11/17 1800  insulin regular (Humulin R) 100 Units in sodium chloride 0.9% 100 mL infusion      -- IV Continuous 04/11/17 1647    04/12/17 1102  insulin aspart pen 0-10 Units      -- SubQ As needed (PRN) 04/12/17 1002    04/14/17 0715  insulin aspart pen 2-4 Units      -- SubQ 3 times daily with meals 04/13/17 1052

## 2017-04-16 LAB
ALBUMIN SERPL BCP-MCNC: 2.1 G/DL
ALP SERPL-CCNC: 71 U/L
ALT SERPL W/O P-5'-P-CCNC: 16 U/L
ANION GAP SERPL CALC-SCNC: 7 MMOL/L
AST SERPL-CCNC: 21 U/L
BASOPHILS # BLD AUTO: 0.05 K/UL
BASOPHILS NFR BLD: 0.9 %
BILIRUB SERPL-MCNC: 0.4 MG/DL
BUN SERPL-MCNC: 14 MG/DL
CALCIUM SERPL-MCNC: 8.4 MG/DL
CHLORIDE SERPL-SCNC: 102 MMOL/L
CO2 SERPL-SCNC: 24 MMOL/L
CREAT SERPL-MCNC: 0.8 MG/DL
DIFFERENTIAL METHOD: ABNORMAL
EOSINOPHIL # BLD AUTO: 0.3 K/UL
EOSINOPHIL NFR BLD: 5.1 %
ERYTHROCYTE [DISTWIDTH] IN BLOOD BY AUTOMATED COUNT: 15.3 %
EST. GFR  (AFRICAN AMERICAN): >60 ML/MIN/1.73 M^2
EST. GFR  (NON AFRICAN AMERICAN): >60 ML/MIN/1.73 M^2
GLUCOSE SERPL-MCNC: 147 MG/DL
HCT VFR BLD AUTO: 23.3 %
HGB BLD-MCNC: 7.7 G/DL
INR PPP: 1
LYMPHOCYTES # BLD AUTO: 2.2 K/UL
LYMPHOCYTES NFR BLD: 39.2 %
MAGNESIUM SERPL-MCNC: 1.6 MG/DL
MCH RBC QN AUTO: 30.6 PG
MCHC RBC AUTO-ENTMCNC: 33 %
MCV RBC AUTO: 93 FL
MONOCYTES # BLD AUTO: 0.7 K/UL
MONOCYTES NFR BLD: 12.7 %
NEUTROPHILS # BLD AUTO: 2.4 K/UL
NEUTROPHILS NFR BLD: 41.7 %
PHOSPHATE SERPL-MCNC: 3.6 MG/DL
PLATELET # BLD AUTO: 269 K/UL
PMV BLD AUTO: 10.8 FL
POCT GLUCOSE: 152 MG/DL (ref 70–110)
POCT GLUCOSE: 161 MG/DL (ref 70–110)
POCT GLUCOSE: 165 MG/DL (ref 70–110)
POCT GLUCOSE: 175 MG/DL (ref 70–110)
POTASSIUM SERPL-SCNC: 4 MMOL/L
PROT SERPL-MCNC: 6.3 G/DL
PROTHROMBIN TIME: 10.9 SEC
RBC # BLD AUTO: 2.52 M/UL
SODIUM SERPL-SCNC: 133 MMOL/L
WBC # BLD AUTO: 5.69 K/UL

## 2017-04-16 PROCEDURE — 63600175 PHARM REV CODE 636 W HCPCS: Performed by: SURGERY

## 2017-04-16 PROCEDURE — 25000003 PHARM REV CODE 250: Performed by: STUDENT IN AN ORGANIZED HEALTH CARE EDUCATION/TRAINING PROGRAM

## 2017-04-16 PROCEDURE — 84100 ASSAY OF PHOSPHORUS: CPT

## 2017-04-16 PROCEDURE — 83735 ASSAY OF MAGNESIUM: CPT

## 2017-04-16 PROCEDURE — 85025 COMPLETE CBC W/AUTO DIFF WBC: CPT

## 2017-04-16 PROCEDURE — 25000003 PHARM REV CODE 250: Performed by: SURGERY

## 2017-04-16 PROCEDURE — 20600001 HC STEP DOWN PRIVATE ROOM

## 2017-04-16 PROCEDURE — 80053 COMPREHEN METABOLIC PANEL: CPT

## 2017-04-16 PROCEDURE — 94664 DEMO&/EVAL PT USE INHALER: CPT

## 2017-04-16 PROCEDURE — 85610 PROTHROMBIN TIME: CPT

## 2017-04-16 PROCEDURE — 86920 COMPATIBILITY TEST SPIN: CPT

## 2017-04-16 RX ORDER — HYDROCODONE BITARTRATE AND ACETAMINOPHEN 500; 5 MG/1; MG/1
TABLET ORAL
Status: DISCONTINUED | OUTPATIENT
Start: 2017-04-17 | End: 2017-04-20 | Stop reason: HOSPADM

## 2017-04-16 RX ORDER — LANOLIN ALCOHOL/MO/W.PET/CERES
400 CREAM (GRAM) TOPICAL DAILY
Status: DISCONTINUED | OUTPATIENT
Start: 2017-04-16 | End: 2017-04-20 | Stop reason: HOSPADM

## 2017-04-16 RX ADMIN — HYDROCODONE BITARTRATE AND ACETAMINOPHEN 1 TABLET: 10; 325 TABLET ORAL at 08:04

## 2017-04-16 RX ADMIN — WARFARIN SODIUM 5 MG: 5 TABLET ORAL at 05:04

## 2017-04-16 RX ADMIN — PROMETHAZINE HYDROCHLORIDE 6.25 MG: 25 INJECTION, SOLUTION INTRAMUSCULAR; INTRAVENOUS at 11:04

## 2017-04-16 RX ADMIN — Medication 3 ML: at 02:04

## 2017-04-16 RX ADMIN — Medication 10 ML: at 12:04

## 2017-04-16 RX ADMIN — INSULIN ASPART 3 UNITS: 100 INJECTION, SOLUTION INTRAVENOUS; SUBCUTANEOUS at 06:04

## 2017-04-16 RX ADMIN — HYDROCODONE BITARTRATE AND ACETAMINOPHEN 1 TABLET: 10; 325 TABLET ORAL at 05:04

## 2017-04-16 RX ADMIN — ONDANSETRON 4 MG: 2 INJECTION INTRAMUSCULAR; INTRAVENOUS at 09:04

## 2017-04-16 RX ADMIN — PANTOPRAZOLE SODIUM 40 MG: 40 TABLET, DELAYED RELEASE ORAL at 08:04

## 2017-04-16 RX ADMIN — Medication 3 ML: at 09:04

## 2017-04-16 RX ADMIN — Medication 10 ML: at 06:04

## 2017-04-16 RX ADMIN — INSULIN ASPART 3 UNITS: 100 INJECTION, SOLUTION INTRAVENOUS; SUBCUTANEOUS at 08:04

## 2017-04-16 RX ADMIN — ENOXAPARIN SODIUM 70 MG: 100 INJECTION SUBCUTANEOUS at 12:04

## 2017-04-16 RX ADMIN — INSULIN ASPART 3 UNITS: 100 INJECTION, SOLUTION INTRAVENOUS; SUBCUTANEOUS at 12:04

## 2017-04-16 RX ADMIN — MAGNESIUM OXIDE TAB 400 MG (241.3 MG ELEMENTAL MG) 400 MG: 400 (241.3 MG) TAB at 09:04

## 2017-04-16 RX ADMIN — ONDANSETRON 4 MG: 2 INJECTION INTRAMUSCULAR; INTRAVENOUS at 12:04

## 2017-04-16 RX ADMIN — LEVOTHYROXINE SODIUM 88 MCG: 88 TABLET ORAL at 06:04

## 2017-04-16 NOTE — PLAN OF CARE
Problem: Patient Care Overview  Goal: Plan of Care Review  Outcome: Ongoing (interventions implemented as appropriate)  POC reviewed with patient, who verbalized understanding. AAOx4. Remains free of falls and injury. VSS. Abdominal ML ROSANNE, with exception of top of incision open, swallow opening replaced gauze with tega-derm/gauze. Post KOURTNEY drain site replaced dressing with gauze and tega-derm. G tube, clamped, suture not intact, MD aware, tape securing tube to abdomen, split gauze in place and changed PRN. Tolerating ADA 1800 diet. Denies nausea and pain is mild and controlled with PRN medication.  Voiding per BC. No BM tonight. Walks with walker. Blood Glucose monitored ACHS. Teds refused, SCDs in place. No acute events. No distress noted. Call bell in reach. Will continue to monitor.

## 2017-04-16 NOTE — PROGRESS NOTES
BG at goal. While inpatient, will continue Novolog 3 units AC. Continue BG ac/hs with low dose correction scale. If discharged, can resume home dose of Metformin    ROBIN Andrews  Endocrinology Nurse Practitioner

## 2017-04-16 NOTE — SUBJECTIVE & OBJECTIVE
Interval History:     Medications:  Continuous Infusions:   Scheduled Meds:   enoxaparin  1 mg/kg Subcutaneous Q12H    insulin aspart  3 Units Subcutaneous TIDWM    levothyroxine  88 mcg Oral Before breakfast    magnesium oxide  400 mg Oral Daily    pantoprazole  40 mg Oral Daily    polyethylene glycol  17 g Oral Daily    senna-docusate 8.6-50 mg  1 tablet Oral Daily    sodium chloride 0.9%  10 mL Intravenous Q6H    sodium chloride 0.9%  3 mL Intravenous Q8H    warfarin  5 mg Oral Daily     PRN Meds:cyclobenzaprine, dextrose 50%, dextrose 50%, diphenhydrAMINE, diphenhydrAMINE, glucagon (human recombinant), glucose, glucose, hydrocodone-acetaminophen 10-325mg, hydrocodone-acetaminophen 5-325mg, insulin aspart, ondansetron, promethazine (PHENERGAN) IVPB, Flushing PICC Protocol **AND** sodium chloride 0.9% **AND** sodium chloride 0.9%     Review of patient's allergies indicates:   Allergen Reactions    Codeine Nausea And Vomiting     Other reaction(s): Itching    Percocet  [oxycodone-acetaminophen]      Other reaction(s): Itching    Sulfa (sulfonamide antibiotics)      Other reaction(s): Nausea  Other reaction(s): Itching    Adhesive Rash     Objective:     Vital Signs (Most Recent):  Temp: 97 °F (36.1 °C) (04/16/17 0838)  Pulse: 77 (04/16/17 0838)  Resp: 15 (04/16/17 0838)  BP: (!) 126/56 (04/16/17 0838)  SpO2: 97 % (04/16/17 0838) Vital Signs (24h Range):  Temp:  [97 °F (36.1 °C)-98.3 °F (36.8 °C)] 97 °F (36.1 °C)  Pulse:  [72-78] 77  Resp:  [15-20] 15  SpO2:  [96 %-100 %] 97 %  BP: (114-135)/(56-64) 126/56     Weight: 69.9 kg (154 lb)  Body mass index is 26.43 kg/(m^2).    Intake/Output - Last 3 Shifts       04/14 0700 - 04/15 0659 04/15 0700 - 04/16 0659 04/16 0700 - 04/17 0659    P.O. 480 1317     I.V. (mL/kg) 20 (0.3)      NG/GT 40      IV Piggyback             Total Intake(mL/kg) 945 (13.5) 1317 (18.9)     Urine (mL/kg/hr) 750 (0.4) 1500 (0.9)     Emesis/NG output  0 (0)     Drains        Other  0 (0)     Stool 0 (0) 0 (0)     Total Output 750 1500      Net +195 -183             Urine Occurrence 4 x 3 x     Stool Occurrence 4 x 1 x     Emesis Occurrence  0 x           Physical Exam    Significant Labs:  CBC:   Recent Labs  Lab 04/16/17  0400   WBC 5.69   RBC 2.52*   HGB 7.7*   HCT 23.3*      MCV 93   MCH 30.6   MCHC 33.0     CMP:   Recent Labs  Lab 04/16/17  0400   *   CALCIUM 8.4*   ALBUMIN 2.1*   PROT 6.3   *   K 4.0   CO2 24      BUN 14   CREATININE 0.8   ALKPHOS 71   ALT 16   AST 21   BILITOT 0.4     Significant Diagnostics:  None

## 2017-04-16 NOTE — ASSESSMENT & PLAN NOTE
S/p ex lap, TIFFANY, G tube placement   G tube clamped  On diabetic diet/low residue diet    Having bowel function. Continue bowel regimen - miralax and senna/colace daily  PRN PO pain meds

## 2017-04-16 NOTE — PLAN OF CARE
Problem: Diabetes, Type 2 (Adult)  Goal: Signs and Symptoms of Listed Potential Problems Will be Absent, Minimized or Managed (Diabetes, Type 2)  Signs and symptoms of listed potential problems will be absent, minimized or managed by discharge/transition of care (reference Diabetes, Type 2 (Adult) CPG).   Pt appetite remains poor.    Problem: Fall Risk (Adult)  Goal: Identify Related Risk Factors and Signs and Symptoms  Related risk factors and signs and symptoms are identified upon initiation of Human Response Clinical Practice Guideline (CPG)   No falls during this shift.     Problem: Pressure Ulcer Risk (Ean Scale) (Adult,Obstetrics,Pediatric)  Goal: Identify Related Risk Factors and Signs and Symptoms  Related risk factors and signs and symptoms are identified upon initiation of Human Response Clinical Practice Guideline (CPG)   Pt repositions self in bed independently.

## 2017-04-17 PROBLEM — K92.2 GI BLEED: Status: ACTIVE | Noted: 2017-04-17

## 2017-04-17 LAB
ABO + RH BLD: NORMAL
ALBUMIN SERPL BCP-MCNC: 2.1 G/DL
ALP SERPL-CCNC: 65 U/L
ALT SERPL W/O P-5'-P-CCNC: 16 U/L
ANION GAP SERPL CALC-SCNC: 11 MMOL/L
ANISOCYTOSIS BLD QL SMEAR: SLIGHT
ANISOCYTOSIS BLD QL SMEAR: SLIGHT
AST SERPL-CCNC: 18 U/L
BASO STIPL BLD QL SMEAR: ABNORMAL
BASOPHILS # BLD AUTO: 0.06 K/UL
BASOPHILS # BLD AUTO: 0.06 K/UL
BASOPHILS # BLD AUTO: 0.07 K/UL
BASOPHILS # BLD AUTO: 0.1 K/UL
BASOPHILS NFR BLD: 0.4 %
BASOPHILS NFR BLD: 0.4 %
BASOPHILS NFR BLD: 0.7 %
BASOPHILS NFR BLD: 0.8 %
BILIRUB SERPL-MCNC: 0.5 MG/DL
BLD GP AB SCN CELLS X3 SERPL QL: NORMAL
BLD PROD TYP BPU: NORMAL
BLD PROD TYP BPU: NORMAL
BLOOD UNIT EXPIRATION DATE: NORMAL
BLOOD UNIT EXPIRATION DATE: NORMAL
BLOOD UNIT TYPE CODE: 6200
BLOOD UNIT TYPE CODE: 6200
BLOOD UNIT TYPE: NORMAL
BLOOD UNIT TYPE: NORMAL
BUN SERPL-MCNC: 22 MG/DL
CALCIUM SERPL-MCNC: 7.9 MG/DL
CHLORIDE SERPL-SCNC: 101 MMOL/L
CO2 SERPL-SCNC: 20 MMOL/L
CODING SYSTEM: NORMAL
CODING SYSTEM: NORMAL
CREAT SERPL-MCNC: 1 MG/DL
DIFFERENTIAL METHOD: ABNORMAL
DISPENSE STATUS: NORMAL
DISPENSE STATUS: NORMAL
EOSINOPHIL # BLD AUTO: 0.1 K/UL
EOSINOPHIL # BLD AUTO: 0.1 K/UL
EOSINOPHIL # BLD AUTO: 0.2 K/UL
EOSINOPHIL # BLD AUTO: 0.2 K/UL
EOSINOPHIL NFR BLD: 0.4 %
EOSINOPHIL NFR BLD: 0.7 %
EOSINOPHIL NFR BLD: 1.1 %
EOSINOPHIL NFR BLD: 1.9 %
ERYTHROCYTE [DISTWIDTH] IN BLOOD BY AUTOMATED COUNT: 14.6 %
ERYTHROCYTE [DISTWIDTH] IN BLOOD BY AUTOMATED COUNT: 14.9 %
ERYTHROCYTE [DISTWIDTH] IN BLOOD BY AUTOMATED COUNT: 15.3 %
ERYTHROCYTE [DISTWIDTH] IN BLOOD BY AUTOMATED COUNT: 15.4 %
EST. GFR  (AFRICAN AMERICAN): >60 ML/MIN/1.73 M^2
EST. GFR  (NON AFRICAN AMERICAN): 55.6 ML/MIN/1.73 M^2
GIANT PLATELETS BLD QL SMEAR: PRESENT
GLUCOSE SERPL-MCNC: 186 MG/DL
HCT VFR BLD AUTO: 20.1 %
HCT VFR BLD AUTO: 21.7 %
HCT VFR BLD AUTO: 23.8 %
HCT VFR BLD AUTO: 27.2 %
HGB BLD-MCNC: 6.5 G/DL
HGB BLD-MCNC: 7.2 G/DL
HGB BLD-MCNC: 7.7 G/DL
HGB BLD-MCNC: 8.9 G/DL
HYPOCHROMIA BLD QL SMEAR: ABNORMAL
INR PPP: 1.2
LYMPHOCYTES # BLD AUTO: 2 K/UL
LYMPHOCYTES # BLD AUTO: 2.8 K/UL
LYMPHOCYTES # BLD AUTO: 3.1 K/UL
LYMPHOCYTES # BLD AUTO: 4.6 K/UL
LYMPHOCYTES NFR BLD: 13.9 %
LYMPHOCYTES NFR BLD: 21.9 %
LYMPHOCYTES NFR BLD: 23.7 %
LYMPHOCYTES NFR BLD: 37.6 %
MAGNESIUM SERPL-MCNC: 1.5 MG/DL
MCH RBC QN AUTO: 30.3 PG
MCH RBC QN AUTO: 30.4 PG
MCH RBC QN AUTO: 30.5 PG
MCH RBC QN AUTO: 30.8 PG
MCHC RBC AUTO-ENTMCNC: 32.4 %
MCHC RBC AUTO-ENTMCNC: 32.7 %
MCHC RBC AUTO-ENTMCNC: 32.8 %
MCHC RBC AUTO-ENTMCNC: 33.2 %
MCV RBC AUTO: 92 FL
MCV RBC AUTO: 93 FL
MCV RBC AUTO: 93 FL
MCV RBC AUTO: 94 FL
MONOCYTES # BLD AUTO: 0.8 K/UL
MONOCYTES # BLD AUTO: 1.1 K/UL
MONOCYTES # BLD AUTO: 1.5 K/UL
MONOCYTES # BLD AUTO: 1.9 K/UL
MONOCYTES NFR BLD: 12.1 %
MONOCYTES NFR BLD: 7.7 %
MONOCYTES NFR BLD: 9.3 %
MONOCYTES NFR BLD: 9.3 %
NEUTROPHILS # BLD AUTO: 15.1 K/UL
NEUTROPHILS # BLD AUTO: 5.5 K/UL
NEUTROPHILS # BLD AUTO: 5.8 K/UL
NEUTROPHILS # BLD AUTO: 9.8 K/UL
NEUTROPHILS NFR BLD: 47.6 %
NEUTROPHILS NFR BLD: 65.2 %
NEUTROPHILS NFR BLD: 68.9 %
NEUTROPHILS NFR BLD: 76 %
OVALOCYTES BLD QL SMEAR: ABNORMAL
PHOSPHATE SERPL-MCNC: 4.1 MG/DL
PLATELET # BLD AUTO: 223 K/UL
PLATELET # BLD AUTO: 280 K/UL
PLATELET # BLD AUTO: 289 K/UL
PLATELET # BLD AUTO: 386 K/UL
PLATELET BLD QL SMEAR: ABNORMAL
PLATELET BLD QL SMEAR: ABNORMAL
PMV BLD AUTO: 10.5 FL
PMV BLD AUTO: 10.6 FL
PMV BLD AUTO: 10.7 FL
PMV BLD AUTO: 10.7 FL
POCT GLUCOSE: 136 MG/DL (ref 70–110)
POCT GLUCOSE: 162 MG/DL (ref 70–110)
POCT GLUCOSE: 178 MG/DL (ref 70–110)
POCT GLUCOSE: 223 MG/DL (ref 70–110)
POLYCHROMASIA BLD QL SMEAR: ABNORMAL
POLYCHROMASIA BLD QL SMEAR: ABNORMAL
POTASSIUM SERPL-SCNC: 4.2 MMOL/L
PREALB SERPL-MCNC: 14 MG/DL
PROT SERPL-MCNC: 6.1 G/DL
PROTHROMBIN TIME: 12.5 SEC
RBC # BLD AUTO: 2.11 M/UL
RBC # BLD AUTO: 2.34 M/UL
RBC # BLD AUTO: 2.53 M/UL
RBC # BLD AUTO: 2.92 M/UL
SODIUM SERPL-SCNC: 132 MMOL/L
TRANS ERYTHROCYTES VOL PATIENT: NORMAL ML
TRANS ERYTHROCYTES VOL PATIENT: NORMAL ML
WBC # BLD AUTO: 12.32 K/UL
WBC # BLD AUTO: 14.32 K/UL
WBC # BLD AUTO: 19.99 K/UL
WBC # BLD AUTO: 8.38 K/UL

## 2017-04-17 PROCEDURE — 25000003 PHARM REV CODE 250: Performed by: SURGERY

## 2017-04-17 PROCEDURE — 84134 ASSAY OF PREALBUMIN: CPT

## 2017-04-17 PROCEDURE — 83735 ASSAY OF MAGNESIUM: CPT

## 2017-04-17 PROCEDURE — 63600175 PHARM REV CODE 636 W HCPCS: Performed by: STUDENT IN AN ORGANIZED HEALTH CARE EDUCATION/TRAINING PROGRAM

## 2017-04-17 PROCEDURE — 86900 BLOOD TYPING SEROLOGIC ABO: CPT

## 2017-04-17 PROCEDURE — P9021 RED BLOOD CELLS UNIT: HCPCS

## 2017-04-17 PROCEDURE — 99900035 HC TECH TIME PER 15 MIN (STAT)

## 2017-04-17 PROCEDURE — 94799 UNLISTED PULMONARY SVC/PX: CPT

## 2017-04-17 PROCEDURE — 85025 COMPLETE CBC W/AUTO DIFF WBC: CPT | Mod: 91

## 2017-04-17 PROCEDURE — 63600175 PHARM REV CODE 636 W HCPCS: Performed by: NURSE PRACTITIONER

## 2017-04-17 PROCEDURE — 25000003 PHARM REV CODE 250: Performed by: STUDENT IN AN ORGANIZED HEALTH CARE EDUCATION/TRAINING PROGRAM

## 2017-04-17 PROCEDURE — 86901 BLOOD TYPING SEROLOGIC RH(D): CPT

## 2017-04-17 PROCEDURE — 94760 N-INVAS EAR/PLS OXIMETRY 1: CPT

## 2017-04-17 PROCEDURE — 85610 PROTHROMBIN TIME: CPT

## 2017-04-17 PROCEDURE — 63600175 PHARM REV CODE 636 W HCPCS: Performed by: PHYSICIAN ASSISTANT

## 2017-04-17 PROCEDURE — 99222 1ST HOSP IP/OBS MODERATE 55: CPT | Mod: ,,, | Performed by: NURSE PRACTITIONER

## 2017-04-17 PROCEDURE — 36430 TRANSFUSION BLD/BLD COMPNT: CPT

## 2017-04-17 PROCEDURE — 99232 SBSQ HOSP IP/OBS MODERATE 35: CPT | Mod: ,,, | Performed by: NURSE PRACTITIONER

## 2017-04-17 PROCEDURE — 93971 EXTREMITY STUDY: CPT | Performed by: SURGERY

## 2017-04-17 PROCEDURE — 80053 COMPREHEN METABOLIC PANEL: CPT

## 2017-04-17 PROCEDURE — 63600175 PHARM REV CODE 636 W HCPCS: Performed by: SURGERY

## 2017-04-17 PROCEDURE — 84100 ASSAY OF PHOSPHORUS: CPT

## 2017-04-17 PROCEDURE — 20600001 HC STEP DOWN PRIVATE ROOM

## 2017-04-17 PROCEDURE — 94664 DEMO&/EVAL PT USE INHALER: CPT

## 2017-04-17 RX ORDER — MAGNESIUM SULFATE HEPTAHYDRATE 40 MG/ML
2 INJECTION, SOLUTION INTRAVENOUS ONCE
Status: COMPLETED | OUTPATIENT
Start: 2017-04-17 | End: 2017-04-17

## 2017-04-17 RX ORDER — HYDROCODONE BITARTRATE AND ACETAMINOPHEN 500; 5 MG/1; MG/1
TABLET ORAL
Status: DISCONTINUED | OUTPATIENT
Start: 2017-04-17 | End: 2017-04-20 | Stop reason: HOSPADM

## 2017-04-17 RX ORDER — INSULIN ASPART 100 [IU]/ML
0-5 INJECTION, SOLUTION INTRAVENOUS; SUBCUTANEOUS EVERY 6 HOURS PRN
Status: DISCONTINUED | OUTPATIENT
Start: 2017-04-17 | End: 2017-04-18

## 2017-04-17 RX ORDER — SODIUM CHLORIDE 9 MG/ML
INJECTION, SOLUTION INTRAVENOUS CONTINUOUS
Status: DISCONTINUED | OUTPATIENT
Start: 2017-04-17 | End: 2017-04-19

## 2017-04-17 RX ORDER — GLUCAGON 1 MG
1 KIT INJECTION
Status: DISCONTINUED | OUTPATIENT
Start: 2017-04-17 | End: 2017-04-18

## 2017-04-17 RX ADMIN — ALTEPLASE 2 MG: 2.2 INJECTION, POWDER, LYOPHILIZED, FOR SOLUTION INTRAVENOUS at 11:04

## 2017-04-17 RX ADMIN — Medication 10 ML: at 05:04

## 2017-04-17 RX ADMIN — ONDANSETRON 4 MG: 2 INJECTION INTRAMUSCULAR; INTRAVENOUS at 07:04

## 2017-04-17 RX ADMIN — MAGNESIUM OXIDE TAB 400 MG (241.3 MG ELEMENTAL MG) 400 MG: 400 (241.3 MG) TAB at 09:04

## 2017-04-17 RX ADMIN — Medication 3 ML: at 05:04

## 2017-04-17 RX ADMIN — PANTOPRAZOLE SODIUM 40 MG: 40 TABLET, DELAYED RELEASE ORAL at 09:04

## 2017-04-17 RX ADMIN — Medication 3 ML: at 06:04

## 2017-04-17 RX ADMIN — Medication 10 ML: at 11:04

## 2017-04-17 RX ADMIN — Medication 10 ML: at 06:04

## 2017-04-17 RX ADMIN — SODIUM CHLORIDE: 0.9 INJECTION, SOLUTION INTRAVENOUS at 10:04

## 2017-04-17 RX ADMIN — SODIUM CHLORIDE: 0.9 INJECTION, SOLUTION INTRAVENOUS at 09:04

## 2017-04-17 RX ADMIN — Medication 10 ML: at 12:04

## 2017-04-17 RX ADMIN — INSULIN ASPART 1 UNITS: 100 INJECTION, SOLUTION INTRAVENOUS; SUBCUTANEOUS at 12:04

## 2017-04-17 RX ADMIN — HYDROCODONE BITARTRATE AND ACETAMINOPHEN 1 TABLET: 5; 325 TABLET ORAL at 10:04

## 2017-04-17 RX ADMIN — MAGNESIUM SULFATE IN WATER 2 G: 40 INJECTION, SOLUTION INTRAVENOUS at 12:04

## 2017-04-17 RX ADMIN — LEVOTHYROXINE SODIUM 88 MCG: 88 TABLET ORAL at 06:04

## 2017-04-17 RX ADMIN — HYDROCODONE BITARTRATE AND ACETAMINOPHEN 1 TABLET: 10; 325 TABLET ORAL at 07:04

## 2017-04-17 NOTE — ASSESSMENT & PLAN NOTE
BG goal 140-180 while hospitalized. Pt will be NPO today.   Change BG monitoring to q6h with low dose correction scale while NPO.   Hold scheduled Novolog to 3 units while pt is NPO.       DISCHARGE PLANNING - Discussed with pt and daughter A1c in DM range since 11/2016. Continue Metformin home dose (as long as kidney and liver function WNL on discharge). Has Metformin and BG meter and supplies at home

## 2017-04-17 NOTE — PROGRESS NOTES
[facility]  General Surgery  Progress Note    Subjective:     Interval History:   Patient seen and examined this AM. +bloody BMs, G tube to gravity - no blood in G-tube, remains NPO, with nausea, no vomiting. Pain well managed.       Post-Op Info:  Procedure(s) (LRB):  EXPLORATORY-LAPAROTOMY (N/A)  REPAIR  LYSIS-ADHESION (N/A)  PLACEMENT   16 Days Post-Op      Medications:  Continuous Infusions:   sodium chloride 0.9%       Scheduled Meds:   insulin aspart  3 Units Subcutaneous TIDWM    levothyroxine  88 mcg Oral Before breakfast    magnesium oxide  400 mg Oral Daily    pantoprazole  40 mg Oral Daily    polyethylene glycol  17 g Oral Daily    senna-docusate 8.6-50 mg  1 tablet Oral Daily    sodium chloride 0.9%  10 mL Intravenous Q6H    sodium chloride 0.9%  3 mL Intravenous Q8H     PRN Meds:sodium chloride, sodium chloride, sodium chloride, cyclobenzaprine, dextrose 50%, dextrose 50%, diphenhydrAMINE, diphenhydrAMINE, glucagon (human recombinant), glucose, glucose, hydrocodone-acetaminophen 10-325mg, hydrocodone-acetaminophen 5-325mg, insulin aspart, ondansetron, promethazine (PHENERGAN) IVPB, Flushing PICC Protocol **AND** sodium chloride 0.9% **AND** sodium chloride 0.9%     Objective:     Vital Signs (Most Recent):  Temp: 97.5 °F (36.4 °C) (04/17/17 0753)  Pulse: 79 (04/17/17 0753)  Resp: 16 (04/17/17 0753)  BP: 125/60 (04/17/17 0753)  SpO2: 98 % (04/17/17 0753) Vital Signs (24h Range):  Temp:  [97 °F (36.1 °C)-99.1 °F (37.3 °C)] 97.5 °F (36.4 °C)  Pulse:  [] 79  Resp:  [15-18] 16  SpO2:  [95 %-100 %] 98 %  BP: (105-137)/(55-75) 125/60       Intake/Output Summary (Last 24 hours) at 04/17/17 0818  Last data filed at 04/17/17 0645   Gross per 24 hour   Intake          1147.42 ml   Output              500 ml   Net           647.42 ml       Physical Exam   Constitutional: She appears well-developed and well-nourished. No distress.   HENT:   Head: Normocephalic and atraumatic.    Cardiovascular: Normal rate and regular rhythm.    Pulmonary/Chest: Effort normal. No respiratory distress.   Abdominal:   Soft, appropriate TTP, incision - staples in place, clean dry and intact; incision opened at superior aspect, repacked with gauze this AM; decreased erythema   G-tube in place to gravity, previous KOURTNEY drain site - clean, dry and intact      Significant Labs:  BMP:     Recent Labs  Lab 04/17/17  0307   *   *   K 4.2      CO2 20*   BUN 22   CREATININE 1.0   CALCIUM 7.9*   MG 1.5*     CBC:     Recent Labs  Lab 04/17/17  0634   WBC 19.99*   RBC 2.53*   HGB 7.7*   HCT 23.8*      MCV 94   MCH 30.4   MCHC 32.4     CMP:     Recent Labs  Lab 04/17/17  0307   *   CALCIUM 7.9*   ALBUMIN 2.1*   PROT 6.1   *   K 4.2   CO2 20*      BUN 22   CREATININE 1.0   ALKPHOS 65   ALT 16   AST 18   BILITOT 0.5     LFTs:     Recent Labs  Lab 04/17/17  0307   ALT 16   AST 18   ALKPHOS 65   BILITOT 0.5   PROT 6.1   ALBUMIN 2.1*     All pertinent labs from the last 24 hours have been reviewed.    Assessment/Plan:   73 yo female s/p ex lap with TIFFANY and G-tube placement    S/p ex lap for small bowel obstruction  -G-tube to gravity  -NPO  -PO pain meds - norco  -nausea meds PRN  -continue bowel regimen - miralax, senna    GI bleed  -will consult GI  -d/c anticoagulants    Post-operative wound infection  - continue wet to dry packing daily    DM  -endocrine consulted, appreciate recs  - home meds  - SSI    Muscle spasms  -flexeril     Protein calorie malnutrition  -Prealbumin = 14 (4/17/17)  -continue renal diet when able    Right arm brachial vein thrombosis  - will d/c lovenox due to GI bleed  - warm compresses PRN    Hypothyroidism  -home meds  -endocrine following    Hypomagnesemia  -replace    Hyponatremia  -monitor    Prophylaxis  GI/DVT    Dispo:  -PT/OT recommending home health; however, patient lives alone and will not have adequate support, so she and her family request  referral to SNF; will discuss with OPAL [place] PA-C  [id number]

## 2017-04-17 NOTE — PROGRESS NOTES
"[facility]  Endocrinology  Progress Note    Admit Date: 3/30/2017     Reason for Consult: Management of T2DM, Hyperglycemia     Surgical Procedure and Date: EXPLORATORY-LAPAROTOMY (N/A)  REPAIR  LYSIS-ADHESION (N/A)  G-TUBE PLACEMENT on 4/1/17    Diabetes diagnosis year: PreDM for a "few years" per family report, DM on labs since 11/2016    Home Diabetes Medications:  Metformin 750 mg BID     How often checking glucose at home? One   BG readings on regimen: <140  Hypoglycemia on the regimen?  No  Missed doses on regimen?  No      HPI:   Patient is a 72 y.o. female with a diagnosis of Pre-DM per daughter's report. However, A1c upon admission 6.6% and has been in DM range since 11/2016. Followed by PCP.    Chronic medical conditions include: remote colon CA (s/p colon resection), SBO 2/2 adhesions, T2DM, GERD, fatty liver, HLD, and  hypothyroidism.     Patient presented to ED with abdominal discomfort. Admitted for treatment of SBO. Now, s/p ex-lap with adhesion repair on 4/1/17  Endocrine consulted for BG management.       Interval HPI:   Overnight events:. +bloody BMs this AM, receiving blood products. Colorectal surgery consulted. BG at/near goal on scheduled Novolog 3 units AC. On NS @ 125 ml/hr.   Eating:   NPO  Nausea: Yes, denies vomiting.   Hypoglycemia and intervention: No  Fever: No  TPN and/or TF: No     /60 (BP Location: Left arm, Patient Position: Lying, BP Method: Automatic)  Pulse 79  Temp 97.5 °F (36.4 °C) (Oral)   Resp 16  Ht 5' 4" (1.626 m)  Wt 69.9 kg (154 lb)  LMP  (LMP Unknown)  SpO2 98%  Breastfeeding? No  BMI 26.43 kg/m2    Labs Reviewed and Include      Recent Labs  Lab 04/17/17  0307   *   CALCIUM 7.9*   ALBUMIN 2.1*   PROT 6.1   *   K 4.2   CO2 20*      BUN 22   CREATININE 1.0   ALKPHOS 65   ALT 16   AST 18   BILITOT 0.5     Lab Results   Component Value Date    WBC 19.99 (H) 04/17/2017    HGB 7.7 (L) 04/17/2017    HCT 23.8 (L) 04/17/2017 "    MCV 94 04/17/2017     04/17/2017       Recent Labs  Lab 04/15/17  0332   TSH 23.093*   FREET4 0.47*     Lab Results   Component Value Date    HGBA1C 6.6 (H) 03/30/2017       Nutritional status:   Body mass index is 26.43 kg/(m^2).  Lab Results   Component Value Date    ALBUMIN 2.1 (L) 04/17/2017    ALBUMIN 2.1 (L) 04/16/2017    ALBUMIN 2.1 (L) 04/15/2017     Lab Results   Component Value Date    PREALBUMIN 14 (L) 04/17/2017    PREALBUMIN 9 (L) 04/13/2017    PREALBUMIN 7 (L) 04/10/2017       Estimated Creatinine Clearance: 47.4 mL/min (based on Cr of 1).    Accu-Checks  Recent Labs      04/15/17   0039  04/15/17   0358  04/15/17   0805  04/15/17   1228  04/15/17   1811  04/15/17   2356  04/16/17   0842  04/16/17   1220  04/16/17   1759  04/17/17   0017   POCTGLUCOSE  124*  165*  188*  156*  109  175*  161*  165*  152*  223*       Current Medications and/or Treatments Impacting Glycemic Control  Immunotherapy:  Immunosuppressants     None        Steroids:   Hormones     None        Pressors:    Autonomic Drugs     None        Hyperglycemia/Diabetes Medications: Antihyperglycemics     Start     Stop Route Frequency Ordered    04/15/17 1103  insulin aspart pen 0-5 Units      -- SubQ Before meals & nightly PRN 04/15/17 1003    04/15/17 1230  insulin aspart pen 3 Units      -- SubQ 3 times daily with meals 04/15/17 1150          ASSESSMENT and PLAN    Type 2 diabetes mellitus without complication, without long-term current use of insulin  BG goal 140-180 while hospitalized. Pt will be NPO today.   Change BG monitoring to q6h with low dose correction scale while NPO.   Hold scheduled Novolog 3 units while pt is NPO.       DISCHARGE PLANNING - Discussed with pt and daughter A1c in DM range since 11/2016. Continue Metformin home dose (as long as kidney and liver function WNL on discharge). Has Metformin and BG meter and supplies at home     * SBO (small bowel obstruction)  Per surgery s/p ex-lap       History of  colon cancer  Noted       Hypothyroid    Lab Results   Component Value Date    TSH 23.093 (H) 04/15/2017     Restarted on home dose of synthroid 88 mcg PO daily.       GI bleed  Colorectal surgery consulted.   Receiving blood products today.   NPO        Hollie Nam, NP  Endocrinology  [facility]

## 2017-04-17 NOTE — PT/OT/SLP PROGRESS
"Physical Therapy      Anayeli Judd  MRN: [id number]    Patient not seen today secondary to patient unable to tolerate session.  Patient stating, "It's not happening today." Pt with bleeding today and had to receive 2 units of blood. Pt pale and c/o feeling weak and unable to get up without passing out.  Patient declined to attempt to participate. Will follow-up at next scheduled session.    Ritika Philip, PT   4/47/2017  "

## 2017-04-17 NOTE — PLAN OF CARE
Problem: Patient Care Overview  Goal: Plan of Care Review  Outcome: Ongoing (interventions implemented as appropriate)  POC reviewed with patient. VSS. Patient's pain and nausea treated with PRN medications. 1 unit of RBC infused during shift per orders. Compliant with NPO status. Accuchecks completed. Using bedpan. Currently no BMs during shift. G tube flushed and unclamped per orders. Patient remained free from falls and trauma. Family at the bedside. Call light in reach. WCTM.

## 2017-04-17 NOTE — PLAN OF CARE
OPAL has faxed updated Md notes, nursing notes, and Dietitian eval/note as requested by Danya Finley, admission director for [facility].  OPAL has also notified Danya that pt is not ready for discharge as she has been referred to Colon rectal services for consultation.  Pt is expected to dc on Friday, April 19, 2017.    Marck Zhao, OPAL  [phone]

## 2017-04-17 NOTE — SUBJECTIVE & OBJECTIVE
"Interval HPI:   Overnight events:. +bloody BMs this AM, receiving blood products. Colorectal surgery consulted. BG at/near goal on scheduled Novolog 3 units AC. On NS @ 125 ml/hr.   Eating:   NPO  Nausea: No  Hypoglycemia and intervention: No  Fever: No  TPN and/or TF: No     /60 (BP Location: Left arm, Patient Position: Lying, BP Method: Automatic)  Pulse 79  Temp 97.5 °F (36.4 °C) (Oral)   Resp 16  Ht 5' 4" (1.626 m)  Wt 69.9 kg (154 lb)  LMP  (LMP Unknown)  SpO2 98%  Breastfeeding? No  BMI 26.43 kg/m2    Labs Reviewed and Include      Recent Labs  Lab 04/17/17  0307   *   CALCIUM 7.9*   ALBUMIN 2.1*   PROT 6.1   *   K 4.2   CO2 20*      BUN 22   CREATININE 1.0   ALKPHOS 65   ALT 16   AST 18   BILITOT 0.5     Lab Results   Component Value Date    WBC 19.99 (H) 04/17/2017    HGB 7.7 (L) 04/17/2017    HCT 23.8 (L) 04/17/2017    MCV 94 04/17/2017     04/17/2017       Recent Labs  Lab 04/15/17  0332   TSH 23.093*   FREET4 0.47*     Lab Results   Component Value Date    HGBA1C 6.6 (H) 03/30/2017       Nutritional status:   Body mass index is 26.43 kg/(m^2).  Lab Results   Component Value Date    ALBUMIN 2.1 (L) 04/17/2017    ALBUMIN 2.1 (L) 04/16/2017    ALBUMIN 2.1 (L) 04/15/2017     Lab Results   Component Value Date    PREALBUMIN 14 (L) 04/17/2017    PREALBUMIN 9 (L) 04/13/2017    PREALBUMIN 7 (L) 04/10/2017       Estimated Creatinine Clearance: 47.4 mL/min (based on Cr of 1).    Accu-Checks  Recent Labs      04/15/17   0039  04/15/17   0358  04/15/17   0805  04/15/17   1228  04/15/17   1811  04/15/17   2356  04/16/17   0842  04/16/17   1220  04/16/17   1759  04/17/17   0017   POCTGLUCOSE  124*  165*  188*  156*  109  175*  161*  165*  152*  223*       Current Medications and/or Treatments Impacting Glycemic Control  Immunotherapy:  Immunosuppressants     None        Steroids:   Hormones     None        Pressors:    Autonomic Drugs     None        Hyperglycemia/Diabetes " Medications: Antihyperglycemics     Start     Stop Route Frequency Ordered    04/15/17 1103  insulin aspart pen 0-5 Units      -- SubQ Before meals & nightly PRN 04/15/17 1003    04/15/17 1230  insulin aspart pen 3 Units      -- SubQ 3 times daily with meals 04/15/17 1150

## 2017-04-17 NOTE — PLAN OF CARE
Problem: Patient Care Overview  Goal: Plan of Care Review  Outcome: Ongoing (interventions implemented as appropriate)  POC reviewed with patient, who verbalized understanding. AAOx4. Remains free of falls and injury. VSS. Abdominal ML ROSANNE, with exception of top of incision open, swallow opening replaced gauze with tega-derm/gauze. Post KOURTNEY drain site replaced dressing with gauze and tape. G tube, clamped, suture not intact, MD aware, split gauze in place and changed PRN. Tolerating ADA 1800 diet. Frequent nausea, Denies pain.  Voiding per BC/bedpan. x5 bloody BM, patient pale, weak and clammy. MD called and reported to bedside. 2 units of blood ordered. First unit infusing.  Blood Glucose monitored ACHS. Teds refused, SCDs in place most of the night. No acute events. No distress noted. Call bell in reach. Will continue to monitor.

## 2017-04-17 NOTE — ASSESSMENT & PLAN NOTE
Lab Results   Component Value Date    TSH 23.093 (H) 04/15/2017     Restarted on home dose of synthroid 88 mcg PO daily.

## 2017-04-17 NOTE — PROGRESS NOTES
Call to bedside. Pt having dark red BMs. G-tube flushed and open to gravity no blood aspirated. Stat CBC ordered. Prepared 2 u PRBC, will monitor and follow up am CBC.    Yary Magana MD  Surgery Resident, PGY-1  Pager [phone]  04/17/2017

## 2017-04-17 NOTE — CONSULTS
Consult Note    Consults  SUBJECTIVE:     History of Present Illness:  This is a 72 year old female with history of colon cancer s/p transverse colectomy y Dr. Aguirre for a stage II T3NoMx colon cancer, received 5 cyclces of Folfox post op but could not complete regime due to diarrhea and several hospital stays.  Since her surgery she has colonoscopy 13 by Dr. Reynolds for diarrhea, scope was normal, path active colitics.  She had a SBO in 2014 and underwent a TIFFANY, SBR times 2 by Dr. Silvestre 14. She presented again 3/30/17 with abd pain, work up revealed a SBO including Ct scan which did show a SBO but no evidence of metastatic disease from colon cancer and again underwent a TIFFANY, SBR and repair of enterotomy times 14 and placement of G tube by Dr. Fletcher.  Since this last surgery she has had very little oral intake and was on TPN but over the last week she had improved and was taking in some oral nutrition.  The General surgery was working towards discharge when last night () she had at least 6 bloody bowel movements, Hct did drop 20..  Daughter who has been with  mother since 4am says she has not had any further bouts of bleeding from rectum, daughter described the stool as dark green black with some bright red streak during the night.Pt denies abd pain, no rectal pain, no n/v      Review of patient's allergies indicates:   Allergen Reactions    Codeine Nausea And Vomiting     Other reaction(s): Itching    Percocet  [oxycodone-acetaminophen]      Other reaction(s): Itching    Sulfa (sulfonamide antibiotics)      Other reaction(s): Nausea  Other reaction(s): Itching    Adhesive Rash     Past Medical History:   Diagnosis Date    Allergic rhinitis     Arthritis     Blood transfusion     during delivery and     Bowel obstruction     Cervical radiculopathy     followed by dr reynolds    Colon cancer     Diarrhea     Family history of breast cancer     Family history of  colon cancer     Fatty liver     GERD (gastroesophageal reflux disease)     History of shingles     Hyperlipidemia     Hypothyroidism     Irritable bowel syndrome     Microscopic colitis     treated 2013    Raynaud phenomenon     Raynaud's disease     Type 2 diabetes mellitus      Past Surgical History:   Procedure Laterality Date    APPENDECTOMY      BACK SURGERY      CARPAL TUNNEL RELEASE      bilateral      SECTION      CHOLECYSTECTOMY      CHOLECYSTECTOMY      CHOLECYSTECTOMY  1965    COLECTOMY      tumor removal 201    HYSTERECTOMY      posterolateral fusion with autograft bone and [place] mineralized bone matrix  13    at [facility] for lumbar spine stenosis    TOE SURGERY      TONSILLECTOMY      TRIGGER FINGER RELEASE       Family History   Problem Relation Age of Onset    Heart disease Father 50     Mi age 50    Colon cancer Father     Cancer Father      colon cancer    Bladder Cancer Mother      non smoker    Cataracts Mother     Glaucoma Mother     Heart disease Mother     Hyperlipidemia Mother     Kidney disease Mother     Breast cancer Sister 79    Arthritis Daughter     Asthma Daughter     Depression Daughter     Hypertension Daughter     Stroke Daughter 40    Arthritis Brother     Colon cancer Brother     Cancer Brother 70     colon cancer    Alcohol abuse Brother     Parkinsonism Brother     Alcohol abuse Brother     Depression Daughter     Celiac disease Neg Hx     Cirrhosis Neg Hx     Colon polyps Neg Hx     Crohn's disease Neg Hx     Cystic fibrosis Neg Hx     Esophageal cancer Neg Hx     Hemochromatosis Neg Hx     Inflammatory bowel disease Neg Hx     Irritable bowel syndrome Neg Hx     Liver cancer Neg Hx     Liver disease Neg Hx     Rectal cancer Neg Hx     Stomach cancer Neg Hx     Ulcerative colitis Neg Hx     Eliazar's disease Neg Hx     Amblyopia Neg Hx     Blindness Neg Hx     Macular degeneration Neg Hx     Retinal  "detachment Neg Hx     Strabismus Neg Hx     Melanoma Neg Hx      Social History   Substance Use Topics    Smoking status: Never Smoker    Smokeless tobacco: Never Used    Alcohol use 3.0 oz/week     5 Glasses of wine per week      Comment: socially     Review of Systems   Constitutional: Positive for malaise/fatigue and weight loss. Negative for chills and fever.   Respiratory: Negative for cough, hemoptysis, sputum production, shortness of breath and wheezing.    Cardiovascular: Negative for chest pain, palpitations, orthopnea, claudication and leg swelling.   Gastrointestinal: Positive for blood in stool, diarrhea and melena. Negative for abdominal pain, constipation, heartburn, nausea and vomiting.   Genitourinary: Negative for dysuria, frequency and urgency.   Neurological: Positive for weakness. Negative for dizziness, tingling, tremors and sensory change.   Psychiatric/Behavioral: Negative for depression, hallucinations, substance abuse and suicidal ideas.     OBJECTIVE:     Vital Signs:  Temp:  [97 °F (36.1 °C)-99.1 °F (37.3 °C)]   Pulse:  []   Resp:  [16-18]   BP: (105-131)/(55-75)   SpO2:  [96 %-100 %]     Physical Exam   Constitutional: She is oriented to person, place, and time. She appears well-developed and well-nourished.   Last Hct 23.8 but labs drawn after first unit of blood, currently reveiving second unit of blood   Cardiovascular: Normal rate, regular rhythm and normal heart sounds.    Pulmonary/Chest: Effort normal and breath sounds normal. No respiratory distress. She has no wheezes. She has no rales.   Abdominal: Soft. Bowel sounds are normal. She exhibits no distension and no mass. There is no tenderness.   Tubular "mass: in LLQ  G tube intact , open to gravity, greenish drainage     Neurological: She is alert and oriented to person, place, and time.   Skin: Skin is warm and dry. No rash noted.   Psychiatric: She has a normal mood and affect. Her behavior is normal. Judgment and " thought content normal.   Nursing note and vitals reviewed.    Laboratory:  CBC:   Recent Labs  Lab 04/17/17  0634   WBC 19.99*   RBC 2.53*   HGB 7.7*   HCT 23.8*      MCV 94   MCH 30.4   MCHC 32.4     BMP:   Recent Labs  Lab 04/17/17  0307   *   *   K 4.2      CO2 20*   BUN 22   CREATININE 1.0   CALCIUM 7.9*   MG 1.5*           ASSESSMENT/PLAN:     72 year old female with remote hx of stage II colon cancer with recent surgery for TIFFANY and repair of 14 enterotomies with acute onset of mixed old and bright red blood per rectum    Plan:   Agree with holding lovenox  Keep NPO  Transfuse as needed  Recommend work up for new onset of high white blood cell count  Obtain STAT CTA for any acute bright red blood per rectum  Call CRS for any bright red blood per rectum  May need colonoscopy  Above discussed with Dr. Erazo    Addendum:  Agree with above. 72F s/p ex-lap with extensive TIFFANY for small bowel obstruction, no with hematochezia. Patient interviewed and examined, interview and exam were consistent with the above except maroon colored stool was present on ARMAND. The patient was on therapeutic lovenox for RUE DVT which may have contributed to the lower GIB. Agree with holding this medication for now. Unsure if bleeding is coming from small bowel source of colon source at this time. Regardless, will observe for further bleeding. Obtain CTA with new onset BRBPR with IR embolization with a positive CTA. No surgical intervention at this time. Will continue to follow.    Recent Results (from the past 336 hour(s))   CBC auto differential    Collection Time: 04/17/17  3:50 PM   Result Value Ref Range    WBC 14.32 (H) 3.90 - 12.70 K/uL    Hemoglobin 8.9 (L) 12.0 - 16.0 g/dL    Hematocrit 27.2 (L) 37.0 - 48.5 %    Platelets 223 150 - 350 K/uL   CBC auto differential    Collection Time: 04/17/17  6:34 AM   Result Value Ref Range    WBC 19.99 (H) 3.90 - 12.70 K/uL    Hemoglobin 7.7 (L) 12.0 - 16.0 g/dL     Hematocrit 23.8 (L) 37.0 - 48.5 %    Platelets 280 150 - 350 K/uL   CBC auto differential    Collection Time: 04/17/17  3:07 AM   Result Value Ref Range    WBC 8.38 3.90 - 12.70 K/uL    Hemoglobin 6.5 (L) 12.0 - 16.0 g/dL    Hematocrit 20.1 (L) 37.0 - 48.5 %    Platelets 289 150 - 350 K/uL         Wiley Pitts MD  Colorectal Surgery Fellow  [phone]  Have seen and examined the patient with the fellow and agree with their plan.  YUNI ENAMORADO

## 2017-04-17 NOTE — PLAN OF CARE
OPAL has completed updated clinicals through [facility] fax system. OPAL spoke w/Danya Finley, admissions director for confirmation of receipt of updates.  Danya will present updates to director and get back w/OPAL w/a decision.    Marck Zhao LMSW  [id number]

## 2017-04-17 NOTE — CONSULTS
Consult Note        Consults  SUBJECTIVE:     Please see note 4/17/17      ROS  OBJECTIVE:       Physical Exam      ASSESSMENT/PLAN:

## 2017-04-17 NOTE — PROGRESS NOTES
Rounded on Patient at 2300, patient was in bed and alert, stated still feeling nausea and felt she need to use the BC.  PCT was assisting patient off BC. Patient stated she felt a bit of nausea and about to pass out. PCT called for help. The Charge Liv and AMANDA headed to the room. Patient was pale and stated feeling very hot. Patient had a bowel movement that was loose, liquid and dark red. Vitals taken, stable with tachycardia at 111. MD notified. Dr. Magana advised stat CBC. Order completed. Dr. Magana arrived at bedside. G-tube unclamped and to gravity bag. Light brown drainage. Patient stated that another BM was happening. Placed on bedpan. Medium thick liquid dark red in color. Phenergan 6.25 PRN order infusing. Awaiting further orders. Will continue to monitor.

## 2017-04-18 ENCOUNTER — PATIENT MESSAGE (OUTPATIENT)
Dept: SURGERY | Facility: CLINIC | Age: 72
End: 2017-04-18

## 2017-04-18 PROBLEM — E83.39 HYPOPHOSPHATEMIA: Status: ACTIVE | Noted: 2017-04-18

## 2017-04-18 PROBLEM — E87.6 HYPOKALEMIA: Status: ACTIVE | Noted: 2017-04-18

## 2017-04-18 LAB
ALBUMIN SERPL BCP-MCNC: 2.1 G/DL
ALP SERPL-CCNC: 60 U/L
ALT SERPL W/O P-5'-P-CCNC: 15 U/L
ANION GAP SERPL CALC-SCNC: 6 MMOL/L
ANISOCYTOSIS BLD QL SMEAR: SLIGHT
AST SERPL-CCNC: 19 U/L
BASO STIPL BLD QL SMEAR: ABNORMAL
BASOPHILS # BLD AUTO: 0.03 K/UL
BASOPHILS # BLD AUTO: 0.04 K/UL
BASOPHILS # BLD AUTO: 0.05 K/UL
BASOPHILS NFR BLD: 0.5 %
BASOPHILS NFR BLD: 0.6 %
BASOPHILS NFR BLD: 0.6 %
BILIRUB SERPL-MCNC: 0.8 MG/DL
BUN SERPL-MCNC: 12 MG/DL
CALCIUM SERPL-MCNC: 7.3 MG/DL
CHLORIDE SERPL-SCNC: 106 MMOL/L
CO2 SERPL-SCNC: 23 MMOL/L
CREAT SERPL-MCNC: 0.8 MG/DL
DIFFERENTIAL METHOD: ABNORMAL
EOSINOPHIL # BLD AUTO: 0.1 K/UL
EOSINOPHIL # BLD AUTO: 0.2 K/UL
EOSINOPHIL # BLD AUTO: 0.2 K/UL
EOSINOPHIL NFR BLD: 2.3 %
EOSINOPHIL NFR BLD: 2.5 %
EOSINOPHIL NFR BLD: 2.6 %
ERYTHROCYTE [DISTWIDTH] IN BLOOD BY AUTOMATED COUNT: 15 %
ERYTHROCYTE [DISTWIDTH] IN BLOOD BY AUTOMATED COUNT: 15.3 %
ERYTHROCYTE [DISTWIDTH] IN BLOOD BY AUTOMATED COUNT: 15.4 %
EST. GFR  (AFRICAN AMERICAN): >60 ML/MIN/1.73 M^2
EST. GFR  (NON AFRICAN AMERICAN): >60 ML/MIN/1.73 M^2
GIANT PLATELETS BLD QL SMEAR: PRESENT
GLUCOSE SERPL-MCNC: 134 MG/DL
HCT VFR BLD AUTO: 23.9 %
HCT VFR BLD AUTO: 24.6 %
HCT VFR BLD AUTO: 24.7 %
HGB BLD-MCNC: 8 G/DL
HGB BLD-MCNC: 8.2 G/DL
HGB BLD-MCNC: 8.2 G/DL
INR PPP: 1.2
LYMPHOCYTES # BLD AUTO: 1.4 K/UL
LYMPHOCYTES # BLD AUTO: 1.8 K/UL
LYMPHOCYTES # BLD AUTO: 2.1 K/UL
LYMPHOCYTES NFR BLD: 24.3 %
LYMPHOCYTES NFR BLD: 24.5 %
LYMPHOCYTES NFR BLD: 28 %
MAGNESIUM SERPL-MCNC: 1.8 MG/DL
MCH RBC QN AUTO: 30.5 PG
MCH RBC QN AUTO: 30.5 PG
MCH RBC QN AUTO: 30.8 PG
MCHC RBC AUTO-ENTMCNC: 33.2 %
MCHC RBC AUTO-ENTMCNC: 33.3 %
MCHC RBC AUTO-ENTMCNC: 33.5 %
MCV RBC AUTO: 91 FL
MCV RBC AUTO: 91 FL
MCV RBC AUTO: 93 FL
MONOCYTES # BLD AUTO: 0.6 K/UL
MONOCYTES # BLD AUTO: 0.8 K/UL
MONOCYTES # BLD AUTO: 0.9 K/UL
MONOCYTES NFR BLD: 12.1 %
MONOCYTES NFR BLD: 9.8 %
MONOCYTES NFR BLD: 9.8 %
NEUTROPHILS # BLD AUTO: 3.5 K/UL
NEUTROPHILS # BLD AUTO: 3.6 K/UL
NEUTROPHILS # BLD AUTO: 5.4 K/UL
NEUTROPHILS NFR BLD: 55.9 %
NEUTROPHILS NFR BLD: 61.8 %
NEUTROPHILS NFR BLD: 63 %
PHOSPHATE SERPL-MCNC: 2.3 MG/DL
PLATELET # BLD AUTO: 189 K/UL
PLATELET # BLD AUTO: 194 K/UL
PLATELET # BLD AUTO: 210 K/UL
PLATELET BLD QL SMEAR: ABNORMAL
PMV BLD AUTO: 10.2 FL
PMV BLD AUTO: 10.6 FL
PMV BLD AUTO: 10.7 FL
POCT GLUCOSE: 129 MG/DL (ref 70–110)
POCT GLUCOSE: 159 MG/DL (ref 70–110)
POCT GLUCOSE: 175 MG/DL (ref 70–110)
POCT GLUCOSE: 188 MG/DL (ref 70–110)
POCT GLUCOSE: 211 MG/DL (ref 70–110)
POLYCHROMASIA BLD QL SMEAR: ABNORMAL
POTASSIUM SERPL-SCNC: 3.8 MMOL/L
PROT SERPL-MCNC: 5.6 G/DL
PROTHROMBIN TIME: 13 SEC
RBC # BLD AUTO: 2.62 M/UL
RBC # BLD AUTO: 2.66 M/UL
RBC # BLD AUTO: 2.69 M/UL
SODIUM SERPL-SCNC: 135 MMOL/L
WBC # BLD AUTO: 5.64 K/UL
WBC # BLD AUTO: 6.43 K/UL
WBC # BLD AUTO: 8.64 K/UL

## 2017-04-18 PROCEDURE — 80053 COMPREHEN METABOLIC PANEL: CPT

## 2017-04-18 PROCEDURE — 25000003 PHARM REV CODE 250: Performed by: PHYSICIAN ASSISTANT

## 2017-04-18 PROCEDURE — 20600001 HC STEP DOWN PRIVATE ROOM

## 2017-04-18 PROCEDURE — 36415 COLL VENOUS BLD VENIPUNCTURE: CPT

## 2017-04-18 PROCEDURE — 25000003 PHARM REV CODE 250: Performed by: STUDENT IN AN ORGANIZED HEALTH CARE EDUCATION/TRAINING PROGRAM

## 2017-04-18 PROCEDURE — 25000003 PHARM REV CODE 250: Performed by: SURGERY

## 2017-04-18 PROCEDURE — 94760 N-INVAS EAR/PLS OXIMETRY 1: CPT

## 2017-04-18 PROCEDURE — 99232 SBSQ HOSP IP/OBS MODERATE 35: CPT | Mod: ,,, | Performed by: NURSE PRACTITIONER

## 2017-04-18 PROCEDURE — 94664 DEMO&/EVAL PT USE INHALER: CPT

## 2017-04-18 PROCEDURE — 85025 COMPLETE CBC W/AUTO DIFF WBC: CPT

## 2017-04-18 PROCEDURE — 97116 GAIT TRAINING THERAPY: CPT

## 2017-04-18 PROCEDURE — 97110 THERAPEUTIC EXERCISES: CPT

## 2017-04-18 PROCEDURE — 84100 ASSAY OF PHOSPHORUS: CPT

## 2017-04-18 PROCEDURE — 97535 SELF CARE MNGMENT TRAINING: CPT

## 2017-04-18 PROCEDURE — 85610 PROTHROMBIN TIME: CPT

## 2017-04-18 PROCEDURE — 83735 ASSAY OF MAGNESIUM: CPT

## 2017-04-18 PROCEDURE — 63600175 PHARM REV CODE 636 W HCPCS: Performed by: NURSE PRACTITIONER

## 2017-04-18 PROCEDURE — 94799 UNLISTED PULMONARY SVC/PX: CPT

## 2017-04-18 PROCEDURE — 97803 MED NUTRITION INDIV SUBSEQ: CPT

## 2017-04-18 RX ORDER — GLUCAGON 1 MG
1 KIT INJECTION
Status: DISCONTINUED | OUTPATIENT
Start: 2017-04-18 | End: 2017-04-20 | Stop reason: HOSPADM

## 2017-04-18 RX ORDER — INSULIN ASPART 100 [IU]/ML
3 INJECTION, SOLUTION INTRAVENOUS; SUBCUTANEOUS
Status: DISCONTINUED | OUTPATIENT
Start: 2017-04-18 | End: 2017-04-20 | Stop reason: HOSPADM

## 2017-04-18 RX ORDER — IBUPROFEN 200 MG
24 TABLET ORAL
Status: DISCONTINUED | OUTPATIENT
Start: 2017-04-18 | End: 2017-04-20 | Stop reason: HOSPADM

## 2017-04-18 RX ORDER — INSULIN ASPART 100 [IU]/ML
0-5 INJECTION, SOLUTION INTRAVENOUS; SUBCUTANEOUS
Status: DISCONTINUED | OUTPATIENT
Start: 2017-04-18 | End: 2017-04-20 | Stop reason: HOSPADM

## 2017-04-18 RX ORDER — SODIUM,POTASSIUM PHOSPHATES 280-250MG
2 POWDER IN PACKET (EA) ORAL ONCE
Status: COMPLETED | OUTPATIENT
Start: 2017-04-18 | End: 2017-04-18

## 2017-04-18 RX ORDER — INSULIN ASPART 100 [IU]/ML
3 INJECTION, SOLUTION INTRAVENOUS; SUBCUTANEOUS ONCE
Status: COMPLETED | OUTPATIENT
Start: 2017-04-18 | End: 2017-04-18

## 2017-04-18 RX ORDER — IBUPROFEN 200 MG
16 TABLET ORAL
Status: DISCONTINUED | OUTPATIENT
Start: 2017-04-18 | End: 2017-04-20 | Stop reason: HOSPADM

## 2017-04-18 RX ADMIN — Medication 3 ML: at 02:04

## 2017-04-18 RX ADMIN — Medication 10 ML: at 12:04

## 2017-04-18 RX ADMIN — INSULIN ASPART 1 UNITS: 100 INJECTION, SOLUTION INTRAVENOUS; SUBCUTANEOUS at 12:04

## 2017-04-18 RX ADMIN — MAGNESIUM OXIDE TAB 400 MG (241.3 MG ELEMENTAL MG) 400 MG: 400 (241.3 MG) TAB at 09:04

## 2017-04-18 RX ADMIN — POTASSIUM & SODIUM PHOSPHATES POWDER PACK 280-160-250 MG 2 PACKET: 280-160-250 PACK at 09:04

## 2017-04-18 RX ADMIN — PANTOPRAZOLE SODIUM 40 MG: 40 TABLET, DELAYED RELEASE ORAL at 09:04

## 2017-04-18 RX ADMIN — SODIUM CHLORIDE: 0.9 INJECTION, SOLUTION INTRAVENOUS at 06:04

## 2017-04-18 RX ADMIN — Medication 10 ML: at 06:04

## 2017-04-18 RX ADMIN — Medication 1 CAPSULE: at 09:04

## 2017-04-18 RX ADMIN — Medication 3 ML: at 10:04

## 2017-04-18 RX ADMIN — SODIUM CHLORIDE: 0.9 INJECTION, SOLUTION INTRAVENOUS at 10:04

## 2017-04-18 RX ADMIN — LEVOTHYROXINE SODIUM 88 MCG: 88 TABLET ORAL at 06:04

## 2017-04-18 RX ADMIN — HYDROCODONE BITARTRATE AND ACETAMINOPHEN 1 TABLET: 10; 325 TABLET ORAL at 03:04

## 2017-04-18 RX ADMIN — INSULIN ASPART 3 UNITS: 100 INJECTION, SOLUTION INTRAVENOUS; SUBCUTANEOUS at 09:04

## 2017-04-18 NOTE — PT/OT/SLP PROGRESS
"Occupational Therapy  Treatment    Anayeli Judd   MRN: [id number]   Admitting Diagnosis: SBO (small bowel obstruction)    OT Date of Treatment: 17   OT Start Time: 1343  OT Stop Time: 1401  OT Total Time (min): 18 min    Billable Minutes:  Self Care/Home Management 18    General Precautions: Standard, fall  Orthopedic Precautions:    Braces:           Subjective:  Communicated with RN prior to session.  "I feel much better today than I did two days ago."  "I think I may need to use the bathroom."    Pain Ratin/10              Pain Rating Post-Intervention: 0/10    Objective:  Patient found with: peripheral IV     Functional Mobility:  Bed Mobility:  Scooting/Bridging: Modified Independent  Supine to Sit: Modified Independent, WIth side rail (HOB elevated)  Sit to Supine: Modified Independent, With side rail (HOB elevated)    Transfers:   Sit <> Stand Assistance: Supervision (EOB>chair)  Sit <> Stand Assistive Device: Rolling Walker  Toilet Transfer Technique: Stand Pivot  Toilet Transfer Assistance: Supervision  Toilet Transfer Assistive Device: Rolling Walker    Functional Ambulation: Pt performed functional mobility ~18ft with Sup and RW.      Activities of Daily Living:     LE Dressing Level of Assistance: Stand by assistance (doff/don depends)  Grooming Position: Standing at sink  Grooming Level of Assistance: Supervision (wash hands)  Toileting Where Assessed: Toilet  Toileting Level of Assistance: Supervision (manage gown and depends, complete sam care post toileting)      Therapeutic Activities and Exercises:  Pt educated on safety with daily tasks OOB, and importance of participating in daily ax. Pt whiteboard updated.      AM-PAC 6 CLICK ADL   How much help from another person does this patient currently need?   1 = Unable, Total/Dependent Assistance  2 = A lot, Maximum/Moderate Assistance  3 = A little, Minimum/Contact Guard/Supervision  4 = None, Modified [place]/Independent    Putting " on and taking off regular lower body clothing? : 3  Bathing (including washing, rinsing, drying)?: 2  Toileting, which includes using toilet, bedpan, or urinal? : 4  Putting on and taking off regular upper body clothing?: 3  Taking care of personal grooming such as brushing teeth?: 4  Eating meals?: 4  Total Score: 20     AM-PAC Raw Score CMS G-Code Modifier Level of Impairment Assistance   6 % Total / Unable   7 - 9 CM 80 - 100% Maximal Assist   10 - 14 CL 60 - 80% Moderate Assist   15 - 19 CK 40 - 60% Moderate Assist   20 - 22 CJ 20 - 40% Minimal Assist   23 CI 1-20% SBA / CGA   24 CH 0% Independent/ Mod I     Patient left up in chair with all lines intact, call button in reach and RN notified    ASSESSMENT:  Anayeli Judd is a 72 y.o. female with a medical diagnosis of SBO (small bowel obstruction). Pt tolerated session well and put forth good effort to participate. Pt demonstrated improvements in overall functional performance and OOB ax tolerance this date. Pt presented with decreased (I), endurance, stability and safety for ADLs, self-care and functional mobility. Pt will benefit from further OT in order to maximize (I) and safety for functional tasks.      Rehab identified problem list/impairments: Rehab identified problem list/impairments: weakness, impaired balance, impaired endurance, impaired self care skills, impaired functional mobilty    Rehab potential is good.    Activity tolerance: Good    Discharge recommendations: Discharge Facility/Level Of Care Needs: home health PT     Barriers to discharge: Barriers to Discharge: None    Equipment recommendations: none     GOALS:   Occupational Therapy Goals        Problem: Occupational Therapy Goal    Goal Priority Disciplines Outcome Interventions   Occupational Therapy Goal     OT, PT/OT Ongoing (interventions implemented as appropriate)    Description:  Goals to be met by: 5/1/2017    Patient will increase functional independence with ADLs by  performing:    UE Dressing with Stand-by Assistance. Met 4/11  LE Dressing with Moderate Assistance. Met 4/11  Updated: LE Dressing with supervision.   Grooming while standing with Set-up Assistance. Met 4/11  Updated: Grooming while standing with (I).  Toileting from toilet with Minimal Assistance for hygiene and clothing management. MET 4/6/17  Updated: Toileting from toilet with (I).  Bathing from  seated with Minimal Assistance.  Toilet transfer to toilet with Supervision. Met 4/18                   Plan:  Patient to be seen 3 x/week to address the above listed problems via self-care/home management, community/work re-entry, therapeutic activities, therapeutic exercises  Plan of Care expires: 05/02/17  Plan of Care reviewed with: patient         AGATA Velez  04/18/2017

## 2017-04-18 NOTE — PT/OT/SLP PROGRESS
"Physical Therapy  Treatment    Anayeli Judd   MRN: [id number]   Admitting Diagnosis: SBO (small bowel obstruction) s/p ex-lap    PT Received On: 17  PT Start Time: 1044     PT Stop Time: 1118    PT Total Time (min): 34 min       Billable Minutes:  Gait [id number], Therapeutic Activity 9 and Therapeutic Exercise 10    Treatment Type: Treatment  PT/PTA: PT     PTA Visit Number: 1       General Precautions: Standard, fall  Orthopedic Precautions: N/A     Subjective:  Communicated with RN prior to session.  "I am feeling much better today.  Yesterday was awful."    Pain Ratin/10   Pain Rating Post-Intervention: 0/10    Objective:   Patient found with:  (IV, SCDs, PEG)  Patient found supine in bed in NAD.  Patient agreed to participate in therapy.  She performed bed mobility without assistance.  Patient ambulated to the bathroom with RW, performed toileting with set up assistance, and performed hand hygiene in standing with RW and no LOB.  Patient ambulated in the espitia 100 feet with RW.  During sitting rest break she participated in sitting LE HEP.  Pt demonstrates independence with HEP with therapist supervising for correct form and exercise completion.  Pt then resumed ambulation with RW x 300 feet with modified independence.     Functional Mobility:  Bed Mobility:   Rolling/Turning to Left: Independent  Rolling/Turning Right: Independent  Supine to Sit: Independent  Sit to Supine: Independent    Transfers:  Sit <> Stand Assistance: Modified Independent (with RW)  Bed <> Chair Assistance: Modified Independent    Gait:   Gait Distance: 100 feet f/b 300 feet modified independent with RW.  Pt demonstrates slightly forward flexed posture but minimal reliance on walker.      Therapeutic Activities and Exercises:  Pt demonstrates independence with HEP with therapist supervising for correct form and exercise completion. Pt performed 15reps/ea of the following exercises:  · Marching  · LAQ  · Ankle pumps  · Glut " sets  · Hip ADD     AM-PAC 6 CLICK MOBILITY  How much help from another person does this patient currently need?   1 = Unable, Total/Dependent Assistance  2 = A lot, Maximum/Moderate Assistance  3 = A little, Minimum/Contact Guard/Supervision  4 = None, Modified [place]/Independent    Turning over in bed (including adjusting bedclothes, sheets and blankets)?: 4  Sitting down on and standing up from a chair with arms (e.g., wheelchair, bedside commode, etc.): 4  Moving from lying on back to sitting on the side of the bed?: 4  Moving to and from a bed to a chair (including a wheelchair)?: 4  Need to walk in hospital room?: 4  Climbing 3-5 steps with a railing?: 3  Total Score: 23    AM-PAC Raw Score CMS G-Code Modifier Level of Impairment Assistance   6 % Total / Unable   7 - 9 CM 80 - 100% Maximal Assist   10 - 14 CL 60 - 80% Moderate Assist   15 - 19 CK 40 - 60% Moderate Assist   20 - 22 CJ 20 - 40% Minimal Assist   23 CI 1-20% SBA / CGA   24 CH 0% Independent/ Mod I     Patient left up in chair with all lines intact, call button in reach and dtr present.    Assessment:  Anayeli Judd is a 72 y.o. female with a medical diagnosis of SBO (small bowel obstruction) and presents with significant progress with functional mobility.  She is at a SBA or modified independent level with bed mobility, transfers and gait.  Patient can ambulate 400 feet with RW.  She is independent with her HEP.   Patient appropriate for discharge from therapy at this time, but due to recent medical setback therapy will continue to follow patient for 1 more visit to ensure mobility status is consistent. Therapy POC decreased to 3x/week with only 1 more therapy session expected.      Rehab identified problem list/impairments: Rehab identified problem list/impairments: impaired functional mobilty, pain, impaired skin    Rehab potential is excellent.    Activity tolerance: Excellent    Discharge recommendations: Discharge  Facility/Level Of Care Needs: home with home health     Barriers to discharge: Barriers to Discharge: None    Equipment recommendations: Equipment Needed After Discharge: none     GOALS:   Physical Therapy Goals        Problem: Physical Therapy Goal    Goal Priority Disciplines Outcome Goal Variances Interventions   Physical Therapy Goal     PT/OT, PT Ongoing (interventions implemented as appropriate)     Description:  Goals to be met by: 2017     Patient will increase functional independence with mobility by performin. Supine to sit with Stand-by Assistance- MET  2. Sit to supine with Stand-by Assistance- MET  3. Bed to chair transfer with Modified [place] using Rolling Walker- met  4. Gait  x 150 feet with [place] using no assistive device. -not met  5. Pt sit to stand with or without RW as needed for safety with mod independent- met                  PLAN:    Patient to be seen 3 x/week  to address the above listed problems via gait training, therapeutic activities, therapeutic exercises  Plan of Care expires: 17  Plan of Care reviewed with: patient, daughter         Ritika VÁSQUEZ Cedrick, PT  2017

## 2017-04-18 NOTE — SUBJECTIVE & OBJECTIVE
Interval History:  2 dark BMs overnight. VSS. Afebrile. G tube remained clamps. Tolerating ice chips. Requesting food. H/H slowly drifting down. No BRBPR.     Medications:  Continuous Infusions:   sodium chloride 0.9% 125 mL/hr at 04/18/17 0646     Scheduled Meds:   levothyroxine  88 mcg Oral Before breakfast    magnesium oxide  400 mg Oral Daily    pantoprazole  40 mg Oral Daily    polyethylene glycol  17 g Oral Daily    senna-docusate 8.6-50 mg  1 tablet Oral Daily    sodium chloride 0.9%  10 mL Intravenous Q6H    sodium chloride 0.9%  3 mL Intravenous Q8H     PRN Meds:sodium chloride, sodium chloride, sodium chloride, cyclobenzaprine, dextrose 50%, diphenhydrAMINE, diphenhydrAMINE, glucagon (human recombinant), hydrocodone-acetaminophen 10-325mg, hydrocodone-acetaminophen 5-325mg, insulin aspart, ondansetron, promethazine (PHENERGAN) IVPB, Flushing PICC Protocol **AND** sodium chloride 0.9% **AND** sodium chloride 0.9%     Review of patient's allergies indicates:   Allergen Reactions    Codeine Nausea And Vomiting     Other reaction(s): Itching    Percocet  [oxycodone-acetaminophen]      Other reaction(s): Itching    Sulfa (sulfonamide antibiotics)      Other reaction(s): Nausea  Other reaction(s): Itching    Adhesive Rash     Objective:     Vital Signs (Most Recent):  Temp: 96.9 °F (36.1 °C) (04/18/17 0724)  Pulse: 70 (04/18/17 0724)  Resp: 16 (04/18/17 0724)  BP: (!) 105/55 (04/18/17 0724)  SpO2: 97 % (04/18/17 0724) Vital Signs (24h Range):  Temp:  [96.9 °F (36.1 °C)-98.2 °F (36.8 °C)] 96.9 °F (36.1 °C)  Pulse:  [70-80] 70  Resp:  [16-18] 16  SpO2:  [96 %-99 %] 97 %  BP: (105-132)/(55-70) 105/55     Weight: 69.9 kg (154 lb)  Body mass index is 26.43 kg/(m^2).    Intake/Output - Last 3 Shifts       04/16 0700 - 04/17 0659 04/17 0700 - 04/18 0659 04/18 0700 - 04/19 0659    P.O. 737 240     I.V. (mL/kg)  1375 (19.7)     Blood 390.4 282.5     NG/GT 20 40     Total Intake(mL/kg) 1147.4 (16.4) 1937.5  (27.7)     Urine (mL/kg/hr) 300 (0.2) 800 (0.5)     Emesis/NG output 0 (0)      Drains 200 (0.1) 350 (0.2)     Other 0 (0)      Stool 0 (0) 0 (0)     Total Output 500 1150      Net +647.4 +787.5             Urine Occurrence 4 x 5 x     Stool Occurrence 8 x 2 x     Emesis Occurrence 0 x            Physical Exam     Constitutional: She appears well-developed and well-nourished. No distress.   HENT:   Head: Normocephalic and atraumatic.   Cardiovascular: Normal rate and regular rhythm.   Pulmonary/Chest: Effort normal. No respiratory distress.   Abdominal:   Soft, appropriate TTP, incision - staples in place, clean dry and intact; incision opened at superior aspect, repacked with gauze this AM;   G-tube in place clamped, previous KOURTNEY drain site - with some serosanguinous drainage.     Significant Labs:  CBC:   Recent Labs  Lab 04/18/17  0405   WBC 6.43   RBC 2.62*   HGB 8.0*   HCT 23.9*      MCV 91   MCH 30.5   MCHC 33.5     CMP:   Recent Labs  Lab 04/18/17  0405   *   CALCIUM 7.3*   ALBUMIN 2.1*   PROT 5.6*   *   K 3.8   CO2 23      BUN 12   CREATININE 0.8   ALKPHOS 60   ALT 15   AST 19   BILITOT 0.8   INR 1.2    Significant Diagnostics:  None

## 2017-04-18 NOTE — PROGRESS NOTES
"[facility]  Endocrinology  Progress Note    Admit Date: 3/30/2017     Reason for Consult: Management of T2DM, Hyperglycemia     Surgical Procedure and Date: EXPLORATORY-LAPAROTOMY (N/A)  REPAIR  LYSIS-ADHESION (N/A)  G-TUBE PLACEMENT on 4/1/17    Diabetes diagnosis year: PreDM for a "few years" per family report, DM on labs since 11/2016    Home Diabetes Medications:  Metformin 750 mg BID     How often checking glucose at home? One   BG readings on regimen: <140  Hypoglycemia on the regimen?  No  Missed doses on regimen?  No      HPI:   Patient is a 72 y.o. female with a diagnosis of Pre-DM per damargaretther's report. However, A1c upon admission 6.6% and has been in DM range since 11/2016. Followed by PCP.    Chronic medical conditions include: remote colon CA (s/p colon resection), SBO 2/2 adhesions, T2DM, GERD, fatty liver, HLD, and  hypothyroidism.     Patient presented to ED with abdominal discomfort. Admitted for treatment of SBO. Now, s/p ex-lap with adhesion repair on 4/1/17  Endocrine consulted for BG management.       Interval HPI:   Overnight events: 2 dark BMS over night. No BRBPR. Received 2 units of PRBCs yesterday. OOB to chair. Feeling "better" today. Restarting low fiber/residue diet this AM. BG at/near goal with minimal insulin needs.   Nausea: No  Hypoglycemia and intervention: No  Fever: No  TPN and/or TF: No    BP (!) 105/55 (BP Location: Left arm, Patient Position: Lying, BP Method: Automatic)  Pulse 70  Temp 96.9 °F (36.1 °C) (Oral)   Resp 16  Ht 5' 4" (1.626 m)  Wt 69.9 kg (154 lb)  LMP  (LMP Unknown)  SpO2 97%  Breastfeeding? No  BMI 26.43 kg/m2    Labs Reviewed and Include      Recent Labs  Lab 04/18/17  0405   *   CALCIUM 7.3*   ALBUMIN 2.1*   PROT 5.6*   *   K 3.8   CO2 23      BUN 12   CREATININE 0.8   ALKPHOS 60   ALT 15   AST 19   BILITOT 0.8     Lab Results   Component Value Date    WBC 6.43 04/18/2017    HGB 8.0 (L) 04/18/2017    HCT 23.9 (L) " 04/18/2017    MCV 91 04/18/2017     04/18/2017       Recent Labs  Lab 04/15/17  0332   TSH 23.093*   FREET4 0.47*     Lab Results   Component Value Date    HGBA1C 6.6 (H) 03/30/2017       Nutritional status:   Body mass index is 26.43 kg/(m^2).  Lab Results   Component Value Date    ALBUMIN 2.1 (L) 04/18/2017    ALBUMIN 2.1 (L) 04/17/2017    ALBUMIN 2.1 (L) 04/16/2017     Lab Results   Component Value Date    PREALBUMIN 14 (L) 04/17/2017    PREALBUMIN 9 (L) 04/13/2017    PREALBUMIN 7 (L) 04/10/2017       Estimated Creatinine Clearance: 59.2 mL/min (based on Cr of 0.8).    Accu-Checks  Recent Labs      04/15/17   2356  04/16/17   0842  04/16/17   1220  04/16/17   1759  04/17/17   0017  04/17/17   0920  04/17/17   1214  04/17/17   1740  04/18/17   0007  04/18/17   0651   POCTGLUCOSE  175*  161*  165*  152*  223*  178*  162*  136*  211*  159*       Current Medications and/or Treatments Impacting Glycemic Control  Immunotherapy:  Immunosuppressants     None        Steroids:   Hormones     None        Pressors:    Autonomic Drugs     None        Hyperglycemia/Diabetes Medications: Antihyperglycemics     Start     Stop Route Frequency Ordered    04/17/17 1655  insulin aspart pen 0-5 Units      -- SubQ Every 6 hours PRN 04/17/17 1555          ASSESSMENT and PLAN    Type 2 diabetes mellitus without complication, without long-term current use of insulin  BG goal 140-180 while hospitalized. Diet restarted today.   Change BG monitoring to AC/HS with low dose correction scale.   Restart scheduled Novolog to 3 units TID AC if patient eat 50% or more of her meal.       DISCHARGE PLANNING - Possible discharge to LTAC tomorrow (4/19/17).    Recommend to discharge to LTAC: either restart Metformin home dose or continue scheduled Novolog 3 units TID AC while in LTAC.   Discussed with pt and daughter A1c in DM range since 11/2016. Continue Metformin home dose (as long as kidney and liver function WNL on discharge). Has  Metformin and BG meter and supplies at home.     * SBO (small bowel obstruction)  Per surgery s/p ex-lap      History of colon cancer  Noted       Hypothyroid    Lab Results   Component Value Date    TSH 23.093 (H) 04/15/2017     On home dose of synthroid 88 mcg PO daily. Recommend to repeat TSH in 6-8 weeks.        GI bleed  Resolving   Colorectal surgery following. No plans for colonoscopy at this time.  Diet restarted this AM.           Hollie Nam NP  Endocrinology  [facility]

## 2017-04-18 NOTE — PROGRESS NOTES
[facility]  Adult Nutrition  Progress Note    SUMMARY     Recommendations  Recommendation/Intervention: Encourage increased PO intake and OS intake. RD to monitor.  Goals: Patient to receive > 85% EEN and EPN  Nutrition Goal Status: goal met  Communication of RD Recs: discussed on rounds    Reason for Assessment  Reason for Assessment: RD follow-up  Diagnosis:  (SBO)  Relevent Medical History: colon cancer s/p partial colectomy, SBO s/p SBR, DM, GERD, HLD   Interdisciplinary Rounds: attended   General Information Comments: POD#17 s/p ex lap, TIFFANY, and G-tube placement. Patient advanced to Low Fiber/Residue diet this AM. Patient no longer on TPN.    Nutrition Prescription Ordered  Current Diet Order: Low Fiber/Residue   Current Nutrition Support Formula Ordered: Discontinued    Evaluation of Received Nutrients/Fluid Intake  IV Fluid (mL): 1200        Nutrition Risk Screen   Nutrition Risk Screen: reduced oral intake over the last month    Nutrition/Diet History  Patient Reported Diet/Restrictions/Preferences: general  Typical Food/Fluid Intake: Patient unable to tolerate PO intake for ~ 2 days PTA per MD note.   Food Preferences: No cultural or Christian needs identified at this time.   Factors Affecting Nutritional Intake: nausea/vomiting     Labs/Tests/Procedures/Meds   Pertinent Labs Reviewed: reviewed  Pertinent Labs Comments: Na 135, Glu 134, Ca 7.3, Phos 2.3, Alb 2.1  Pertinent Medications Reviewed: reviewed  Pertinent Medications Comments: lactobaciluus rhamnosus, insulin, pantoprazole, senna-docusate, IVF    Physical Findings  Overall Physical Appearance: nourished  Tubes: gastrostomy tube  Oral/Mouth Cavity: WDL  Skin: incision    Anthropometrics   Height (inches): 64.02 in  Weight Method: Stated  Weight (kg): 69.9 kg   Ideal Body Weight (IBW), Female: 120.1 lb   % Ideal Body Weight, Female (lb): 128.31 lb  BMI (kg/m2): 26.44  BMI Grade: 25 - 29.9 - overweight     Estimated/Assessed  Needs  Weight Used For Calorie Calculations: 69.9 kg (154 lb 1.6 oz)   Height (cm): 162.6 cm   Energy Need Method: [place]on-St Jeor (1486 kcal/day (1.25))  RMR (Channahon-St. Jeor Equation): 1189.47   Weight Used For Protein Calculations: 69.9 kg (154 lb 1.6 oz)  Protein Requirements: 84-98 g/day (1.2-1.4 g/kg)  Fluid Need Method: RDA Method (1 mL/kcal or per MD)     Monitor and Evaluation  Food and Nutrient Intake: energy intake, food and beverage intake  Food and Nutrient Adminstration: diet order   Physical Activity and Function: nutrition-related ADLs and IADLs  Anthropometric Measurements: weight, weight change  Biochemical Data, Medical Tests and Procedures: electrolyte and renal panel, gastrointestinal profile  Nutrition-Focused Physical Findings: overall appearance    Nutrition Risk  Level of Risk:  (1x/week)    Nutrition Follow-Up  RD Follow-up?: Yes    Nutrition Diagnosis  Problem: Altered GI function  Etiology: SBO  Signs/Symptoms: NPO and need for TPN  Nutrition Diagnosis Status: Continues

## 2017-04-18 NOTE — SUBJECTIVE & OBJECTIVE
"Interval HPI:   Overnight events: 2 dark BMS over night. No BRBPR. Received 2 units of PRBCs yesterday. OOB to chair. Feeling "better" today. Restarting low fiber/residue diet this AM. BG at/near goal with minimal insulin needs.   Nausea: No  Hypoglycemia and intervention: No  Fever: No  TPN and/or TF: No    BP (!) 105/55 (BP Location: Left arm, Patient Position: Lying, BP Method: Automatic)  Pulse 70  Temp 96.9 °F (36.1 °C) (Oral)   Resp 16  Ht 5' 4" (1.626 m)  Wt 69.9 kg (154 lb)  LMP  (LMP Unknown)  SpO2 97%  Breastfeeding? No  BMI 26.43 kg/m2    Labs Reviewed and Include      Recent Labs  Lab 04/18/17  0405   *   CALCIUM 7.3*   ALBUMIN 2.1*   PROT 5.6*   *   K 3.8   CO2 23      BUN 12   CREATININE 0.8   ALKPHOS 60   ALT 15   AST 19   BILITOT 0.8     Lab Results   Component Value Date    WBC 6.43 04/18/2017    HGB 8.0 (L) 04/18/2017    HCT 23.9 (L) 04/18/2017    MCV 91 04/18/2017     04/18/2017       Recent Labs  Lab 04/15/17  0332   TSH 23.093*   FREET4 0.47*     Lab Results   Component Value Date    HGBA1C 6.6 (H) 03/30/2017       Nutritional status:   Body mass index is 26.43 kg/(m^2).  Lab Results   Component Value Date    ALBUMIN 2.1 (L) 04/18/2017    ALBUMIN 2.1 (L) 04/17/2017    ALBUMIN 2.1 (L) 04/16/2017     Lab Results   Component Value Date    PREALBUMIN 14 (L) 04/17/2017    PREALBUMIN 9 (L) 04/13/2017    PREALBUMIN 7 (L) 04/10/2017       Estimated Creatinine Clearance: 59.2 mL/min (based on Cr of 0.8).    Accu-Checks  Recent Labs      04/15/17   2356  04/16/17   0842  04/16/17   1220  04/16/17   1759  04/17/17   0017  04/17/17   0920  04/17/17   1214  04/17/17   1740  04/18/17   0007  04/18/17   0651   POCTGLUCOSE  175*  161*  165*  152*  223*  178*  162*  136*  211*  159*       Current Medications and/or Treatments Impacting Glycemic Control  Immunotherapy:  Immunosuppressants     None        Steroids:   Hormones     None        Pressors:    Autonomic Drugs     None "        Hyperglycemia/Diabetes Medications: Antihyperglycemics     Start     Stop Route Frequency Ordered    04/17/17 1655  insulin aspart pen 0-5 Units      -- SubQ Every 6 hours PRN 04/17/17 7032

## 2017-04-18 NOTE — ASSESSMENT & PLAN NOTE
BG goal 140-180 while hospitalized. Diet restarted today.   Change BG monitoring to AC/HS with low dose correction scale.   Restart scheduled Novolog to 3 units TID AC if patient eat 50% or more of her meal.       DISCHARGE PLANNING - Possible discharge to LTAC tomorrow (4/19/17).    Recommend to discharge to LTAC: either restart Metformin home dose or continue scheduled Novolog 3 units TID AC while in LTAC.   Discussed with pt and daughter A1c in DM range since 11/2016. Continue Metformin home dose (as long as kidney and liver function WNL on discharge). Has Metformin and BG meter and supplies at home.

## 2017-04-18 NOTE — ASSESSMENT & PLAN NOTE
Resolving   Colorectal surgery following. No plans for colonoscopy at this time.  Diet restarted this AM.

## 2017-04-18 NOTE — PLAN OF CARE
Problem: Patient Care Overview  Goal: Plan of Care Review  Outcome: Ongoing (interventions implemented as appropriate)  POC reviewed with patient, who verbalized understanding. AAOx4. Remains free of falls and injury. VSS. Abdominal ML ROSANNE, with exception of top of incision open, swallow opening replaced gauze with gauze and tape. Post KOURTNEY drain site replaced dressing with Mepilex. G tube, clamped, suture not intact, MD aware, split gauze in place and tube taped to abdomen. Tolerating clear liquids. Denies nausea, mild pain controlled with PRN meds. Voiding per BC.  x2 bloody BMs, small dark red. MD aware. CBCs within therapeutic range.Blood Glucose monitored q6. Teds refused, SCDs in place most of the night. No acute events. No distress noted. Call bell in reach. Will continue to monitor.

## 2017-04-18 NOTE — ASSESSMENT & PLAN NOTE
Most likely diverticular bleed   2 dark BMs overnight. H/H slowly trending down  2 units of pRBC required yesterday  Will continue to trend cbc  Restart diet

## 2017-04-18 NOTE — PROGRESS NOTES
[facility]  General Surgery  Progress Note    Subjective:     History of Present Illness:   71 yo female who presented with SBO. Now POD17 from Ex lap, TIFFANY and G tube placement.     Post-Op Info:  Procedure(s) (LRB):  EXPLORATORY-LAPAROTOMY (N/A)  REPAIR  LYSIS-ADHESION (N/A)  PLACEMENT   17 Days Post-Op     Interval History:  2 dark BMs overnight. VSS. Afebrile. G tube remained clamps. Tolerating ice chips. Requesting food. H/H slowly drifting down. No BRBPR.     Medications:  Continuous Infusions:   sodium chloride 0.9% 125 mL/hr at 04/18/17 0646     Scheduled Meds:   levothyroxine  88 mcg Oral Before breakfast    magnesium oxide  400 mg Oral Daily    pantoprazole  40 mg Oral Daily    polyethylene glycol  17 g Oral Daily    senna-docusate 8.6-50 mg  1 tablet Oral Daily    sodium chloride 0.9%  10 mL Intravenous Q6H    sodium chloride 0.9%  3 mL Intravenous Q8H     PRN Meds:sodium chloride, sodium chloride, sodium chloride, cyclobenzaprine, dextrose 50%, diphenhydrAMINE, diphenhydrAMINE, glucagon (human recombinant), hydrocodone-acetaminophen 10-325mg, hydrocodone-acetaminophen 5-325mg, insulin aspart, ondansetron, promethazine (PHENERGAN) IVPB, Flushing PICC Protocol **AND** sodium chloride 0.9% **AND** sodium chloride 0.9%     Review of patient's allergies indicates:   Allergen Reactions    Codeine Nausea And Vomiting     Other reaction(s): Itching    Percocet  [oxycodone-acetaminophen]      Other reaction(s): Itching    Sulfa (sulfonamide antibiotics)      Other reaction(s): Nausea  Other reaction(s): Itching    Adhesive Rash     Objective:     Vital Signs (Most Recent):  Temp: 96.9 °F (36.1 °C) (04/18/17 0724)  Pulse: 70 (04/18/17 0724)  Resp: 16 (04/18/17 0724)  BP: (!) 105/55 (04/18/17 0724)  SpO2: 97 % (04/18/17 0724) Vital Signs (24h Range):  Temp:  [96.9 °F (36.1 °C)-98.2 °F (36.8 °C)] 96.9 °F (36.1 °C)  Pulse:  [70-80] 70  Resp:  [16-18] 16  SpO2:  [96 %-99 %] 97 %  BP:  (105-132)/(55-70) 105/55     Weight: 69.9 kg (154 lb)  Body mass index is 26.43 kg/(m^2).    Intake/Output - Last 3 Shifts       04/16 0700 - 04/17 0659 04/17 0700 - 04/18 0659 04/18 0700 - 04/19 0659    P.O. 737 240     I.V. (mL/kg)  1375 (19.7)     Blood 390.4 282.5     NG/GT 20 40     Total Intake(mL/kg) 1147.4 (16.4) 1937.5 (27.7)     Urine (mL/kg/hr) 300 (0.2) 800 (0.5)     Emesis/NG output 0 (0)      Drains 200 (0.1) 350 (0.2)     Other 0 (0)      Stool 0 (0) 0 (0)     Total Output 500 1150      Net +647.4 +787.5             Urine Occurrence 4 x 5 x     Stool Occurrence 8 x 2 x     Emesis Occurrence 0 x            Physical Exam     Constitutional: She appears well-developed and well-nourished. No distress.   HENT:   Head: Normocephalic and atraumatic.   Cardiovascular: Normal rate and regular rhythm.   Pulmonary/Chest: Effort normal. No respiratory distress.   Abdominal:   Soft, appropriate TTP, incision - staples in place, clean dry and intact; incision opened at superior aspect, repacked with gauze this AM;   G-tube in place clamped, previous KOURTNEY drain site - with some serosanguinous drainage.     Significant Labs:  CBC:   Recent Labs  Lab 04/18/17  0405   WBC 6.43   RBC 2.62*   HGB 8.0*   HCT 23.9*      MCV 91   MCH 30.5   MCHC 33.5     CMP:   Recent Labs  Lab 04/18/17  0405   *   CALCIUM 7.3*   ALBUMIN 2.1*   PROT 5.6*   *   K 3.8   CO2 23      BUN 12   CREATININE 0.8   ALKPHOS 60   ALT 15   AST 19   BILITOT 0.8   INR 1.2    Significant Diagnostics:  None     Assessment/Plan:     * SBO (small bowel obstruction)  S/p ex lap, TIFFANY, G tube placement   G tube clamped  Advance to diabetic diet   Having bowel function. Continue bowel regimen - miralax and senna/colace daily  PRN PO pain meds     Acid reflux  PO protonix     Hypothyroid  Continue home meds  Endo following, appreciate assistance    Type 2 diabetes mellitus without complication, without long-term current use of insulin  SSI.  Endo following.  Appreciate assistance  Advance to diabetic diet, IVF @ 50     Acute deep vein thrombosis (DVT) of upper extremity  Holding lovenox   Holding coumadin   INR 1.2  Re-ultrasounding RUE to see if clot dissipated     Wound infection after surgery  S/p opening incision. Erythema resolved  Continuing daily packing changes   No abx   Afebrile, no leukocytosis     Hypomagnesemia  Replace today     GI bleed  Most likely diverticular bleed   2 dark BMs overnight. H/H slowly trending down  2 units of pRBC required yesterday  Will continue to trend cbc  Restart diet       Hypokalemia  Replace orally today    Hypophosphatemia  Replace orally     Dispo: advance diet, GI bleed most likely resolving, will continue to follow, most likely ready for discharge to LTAC tomorrow     Larry Mc MD  General Surgery  [facility]

## 2017-04-18 NOTE — PLAN OF CARE
Problem: Occupational Therapy Goal  Goal: Occupational Therapy Goal  Goals to be met by: 5/1/2017    Patient will increase functional independence with ADLs by performing:    UE Dressing with Stand-by Assistance. Met 4/11  LE Dressing with Moderate Assistance. Met 4/11  Updated: LE Dressing with supervision.   Grooming while standing with Set-up Assistance. Met 4/11  Updated: Grooming while standing with (I).  Toileting from toilet with Minimal Assistance for hygiene and clothing management. MET 4/6/17  Updated: Toileting from toilet with (I).  Bathing from seated with Minimal Assistance.  Toilet transfer to toilet with Supervision. Met 4/18   1 goal met and two goals updated. Pt progressing towards PLOF.  AGATA Velez  4/18/2017

## 2017-04-18 NOTE — PLAN OF CARE
Problem: Physical Therapy Goal  Goal: Physical Therapy Goal  Goals to be met by: 2017     Patient will increase functional independence with mobility by performin. Supine to sit with Stand-by Assistance- MET  2. Sit to supine with Stand-by Assistance- MET  3. Bed to chair transfer with Modified [place] using Rolling Walker- met  4. Gait x 150 feet with [place] using no assistive device. -not met  5. Pt sit to stand with or without RW as needed for safety with mod independent- met   Outcome: Ongoing (interventions implemented as appropriate)  Pt participating and progressing well.  She has met 4/5 goals.

## 2017-04-18 NOTE — ASSESSMENT & PLAN NOTE
S/p ex lap, TIFFANY, G tube placement   G tube clamped  Advance to diabetic diet   Having bowel function. Continue bowel regimen - miralax and senna/colace daily  PRN PO pain meds

## 2017-04-18 NOTE — CONSULTS
Consult Note    Inpatient consult to Colorectal Surgery  Consult performed by: HERRERA FRANCE  Consult ordered by: MARISABEL GILLIS JR.        SUBJECTIVE:   Please see note dated 4/17/17    ROS      Physical Exam      ASSESSMENT/PLAN:

## 2017-04-18 NOTE — PLAN OF CARE
Visited patient;AAOX4. Not medically stable for discharge;+ bloody stools. NPO/TPN, g-tube to gravity. SW working on SNF, patient & daughters' request. CM will continue to follow.        04/17/17 1300   Right Care Assessment   Can the patient answer the patient profile reliably? Yes, cognitively intact   How often would a person be available to care for the patient? Often   Describe the patient's ability to walk at the present time. Walks with the help of equipment   How does the patient rate their overall health at the present time? Fair   Number of comorbid conditions (as recorded on the chart) Five or more   During the past month, has the patient often been bothered by feeling down, depressed or hopeless? No   During the past month, has the patient often been bothered by little interest or pleasure in doing things? No

## 2017-04-18 NOTE — PLAN OF CARE
SW spoke w/Danya Finley, admission director for [facility], who informed SW that pt has been denied; not appropriated for SNF placement.    Marck Zhao, JOO  Ext. [phone]

## 2017-04-18 NOTE — ASSESSMENT & PLAN NOTE
Lab Results   Component Value Date    TSH 23.093 (H) 04/15/2017     On home dose of synthroid 88 mcg PO daily. Recommend to repeat TSH in 6-8 weeks.

## 2017-04-18 NOTE — PROGRESS NOTES
COLON RECTAL SURGERY  PROGRESS NOTE    LENGTH OF STAY: 19  POST OPERATIVE DAY: 17 Days Post-Op for Procedure(s) (LRB):  EXPLORATORY-LAPAROTOMY (N/A)  REPAIR  LYSIS-ADHESION (N/A)  PLACEMENT     Subjective     Daily HPI: NO acute events overnight, 2 Bms with dark blood.  No evidence of ongoing bleeding.  H/H stable.      Objective     Vitals:    04/18/17 0724   BP: (!) 105/55   Pulse: 70   Resp: 16   Temp: 96.9 °F (36.1 °C)         Intake/Output Summary (Last 24 hours) at 04/18/17 0827  Last data filed at 04/18/17 0445   Gross per 24 hour   Intake           1937.5 ml   Output             1150 ml   Net            787.5 ml       General:  female in nad  Neuro: AAO x4 MAEx4 PERRL  Chest: resps even unlabored, cap refill <2 sec  Abd: soft, non-tender, without masses or organomegaly  Wound (if present): wound margins intact and healing well.  No signs of infection.  Stoma (if present): n/a    Recent Results (from the past 24 hour(s))   POCT glucose    Collection Time: 04/17/17  9:20 AM   Result Value Ref Range    POCT Glucose 178 (H) 70 - 110 mg/dL   POCT glucose    Collection Time: 04/17/17 12:14 PM   Result Value Ref Range    POCT Glucose 162 (H) 70 - 110 mg/dL   CBC auto differential    Collection Time: 04/17/17  3:50 PM   Result Value Ref Range    WBC 14.32 (H) 3.90 - 12.70 K/uL    RBC 2.92 (L) 4.00 - 5.40 M/uL    Hemoglobin 8.9 (L) 12.0 - 16.0 g/dL    Hematocrit 27.2 (L) 37.0 - 48.5 %    MCV 93 82 - 98 fL    MCH 30.5 27.0 - 31.0 pg    MCHC 32.7 32.0 - 36.0 %    RDW 14.6 (H) 11.5 - 14.5 %    Platelets 223 150 - 350 K/uL    MPV 10.7 9.2 - 12.9 fL    Gran # 9.8 (H) 1.8 - 7.7 K/uL    Lymph # 3.1 1.0 - 4.8 K/uL    Mono # 1.1 (H) 0.3 - 1.0 K/uL    Eos # 0.2 0.0 - 0.5 K/uL    Baso # 0.06 0.00 - 0.20 K/uL    Gran% 68.9 38.0 - 73.0 %    Lymph% 21.9 18.0 - 48.0 %    Mono% 7.7 4.0 - 15.0 %    Eosinophil% 1.1 0.0 - 8.0 %    Basophil% 0.4 0.0 - 1.9 %    Platelet Estimate Appears normal     Aniso Slight     Poly  Occasional     Hypo Occasional     Ovalocytes Occasional     Differential Method Automated    POCT glucose    Collection Time: 04/17/17  5:40 PM   Result Value Ref Range    POCT Glucose 136 (H) 70 - 110 mg/dL   CBC auto differential    Collection Time: 04/17/17 11:59 PM   Result Value Ref Range    WBC 8.64 3.90 - 12.70 K/uL    RBC 2.66 (L) 4.00 - 5.40 M/uL    Hemoglobin 8.2 (L) 12.0 - 16.0 g/dL    Hematocrit 24.7 (L) 37.0 - 48.5 %    MCV 93 82 - 98 fL    MCH 30.8 27.0 - 31.0 pg    MCHC 33.2 32.0 - 36.0 %    RDW 15.0 (H) 11.5 - 14.5 %    Platelets 210 150 - 350 K/uL    MPV 10.7 9.2 - 12.9 fL    Gran # 5.4 1.8 - 7.7 K/uL    Lymph # 2.1 1.0 - 4.8 K/uL    Mono # 0.9 0.3 - 1.0 K/uL    Eos # 0.2 0.0 - 0.5 K/uL    Baso # 0.05 0.00 - 0.20 K/uL    Gran% 63.0 38.0 - 73.0 %    Lymph% 24.3 18.0 - 48.0 %    Mono% 9.8 4.0 - 15.0 %    Eosinophil% 2.3 0.0 - 8.0 %    Basophil% 0.6 0.0 - 1.9 %    Platelet Estimate Appears normal     Aniso Slight     Poly Moderate     Basophilic Stippling Occasional     Large/Giant Platelets Present     Differential Method Automated    POCT glucose    Collection Time: 04/18/17 12:07 AM   Result Value Ref Range    POCT Glucose 211 (H) 70 - 110 mg/dL   Comprehensive metabolic panel    Collection Time: 04/18/17  4:05 AM   Result Value Ref Range    Sodium 135 (L) 136 - 145 mmol/L    Potassium 3.8 3.5 - 5.1 mmol/L    Chloride 106 95 - 110 mmol/L    CO2 23 23 - 29 mmol/L    Glucose 134 (H) 70 - 110 mg/dL    BUN, Bld 12 8 - 23 mg/dL    Creatinine 0.8 0.5 - 1.4 mg/dL    Calcium 7.3 (L) 8.7 - 10.5 mg/dL    Total Protein 5.6 (L) 6.0 - 8.4 g/dL    Albumin 2.1 (L) 3.5 - 5.2 g/dL    Total Bilirubin 0.8 0.1 - 1.0 mg/dL    Alkaline Phosphatase 60 55 - 135 U/L    AST 19 10 - 40 U/L    ALT 15 10 - 44 U/L    Anion Gap 6 (L) 8 - 16 mmol/L    eGFR if African American >60.0 >60 mL/min/1.73 m^2    eGFR if non African American >60.0 >60 mL/min/1.73 m^2   Magnesium    Collection Time: 04/18/17  4:05 AM   Result Value Ref  Range    Magnesium 1.8 1.6 - 2.6 mg/dL   Phosphorus    Collection Time: 04/18/17  4:05 AM   Result Value Ref Range    Phosphorus 2.3 (L) 2.7 - 4.5 mg/dL   Protime-INR    Collection Time: 04/18/17  4:05 AM   Result Value Ref Range    Prothrombin Time 13.0 (H) 9.0 - 12.5 sec    INR 1.2 0.8 - 1.2   CBC auto differential    Collection Time: 04/18/17  4:05 AM   Result Value Ref Range    WBC 6.43 3.90 - 12.70 K/uL    RBC 2.62 (L) 4.00 - 5.40 M/uL    Hemoglobin 8.0 (L) 12.0 - 16.0 g/dL    Hematocrit 23.9 (L) 37.0 - 48.5 %    MCV 91 82 - 98 fL    MCH 30.5 27.0 - 31.0 pg    MCHC 33.5 32.0 - 36.0 %    RDW 15.3 (H) 11.5 - 14.5 %    Platelets 194 150 - 350 K/uL    MPV 10.6 9.2 - 12.9 fL    Gran # 3.6 1.8 - 7.7 K/uL    Lymph # 1.8 1.0 - 4.8 K/uL    Mono # 0.8 0.3 - 1.0 K/uL    Eos # 0.2 0.0 - 0.5 K/uL    Baso # 0.04 0.00 - 0.20 K/uL    Gran% 55.9 38.0 - 73.0 %    Lymph% 28.0 18.0 - 48.0 %    Mono% 12.1 4.0 - 15.0 %    Eosinophil% 2.6 0.0 - 8.0 %    Basophil% 0.6 0.0 - 1.9 %    Differential Method Automated    POCT glucose    Collection Time: 04/18/17  6:51 AM   Result Value Ref Range    POCT Glucose 159 (H) 70 - 110 mg/dL       Assessment and Plan     72 year old female with remote hx of stage II colon cancer with recent surgery for TIFFANY and repair of 14 enterotomies with acute onset of mixed old and bright red blood per rectum     Plan:   Agree with holding lovenox  May advance diet today  WBC elevation likely related to transfusion   Obtain STAT CTA for any acute bright red blood per rectum  Call CRS for any bright red blood per rectum  No need for acute colonosocpy at this time     Antoine Wilson MD  Colon and Rectal Surgery Fellow  [phone]  Have seen and examined the patient with the fellow and agree with their plan.  YUNI ENAMORADO

## 2017-04-19 ENCOUNTER — TELEPHONE (OUTPATIENT)
Dept: SURGERY | Facility: CLINIC | Age: 72
End: 2017-04-19

## 2017-04-19 PROBLEM — E87.1 HYPONATREMIA: Status: ACTIVE | Noted: 2017-04-19

## 2017-04-19 LAB
ALBUMIN SERPL BCP-MCNC: 2.2 G/DL
ALP SERPL-CCNC: 55 U/L
ALT SERPL W/O P-5'-P-CCNC: 17 U/L
ANION GAP SERPL CALC-SCNC: 5 MMOL/L
AST SERPL-CCNC: 26 U/L
BASOPHILS # BLD AUTO: 0.03 K/UL
BASOPHILS NFR BLD: 0.6 %
BILIRUB SERPL-MCNC: 0.5 MG/DL
BUN SERPL-MCNC: 7 MG/DL
CALCIUM SERPL-MCNC: 7.6 MG/DL
CHLORIDE SERPL-SCNC: 107 MMOL/L
CO2 SERPL-SCNC: 23 MMOL/L
CREAT SERPL-MCNC: 0.7 MG/DL
DIFFERENTIAL METHOD: ABNORMAL
EOSINOPHIL # BLD AUTO: 0.3 K/UL
EOSINOPHIL NFR BLD: 5 %
ERYTHROCYTE [DISTWIDTH] IN BLOOD BY AUTOMATED COUNT: 15.7 %
EST. GFR  (AFRICAN AMERICAN): >60 ML/MIN/1.73 M^2
EST. GFR  (NON AFRICAN AMERICAN): >60 ML/MIN/1.73 M^2
GLUCOSE SERPL-MCNC: 125 MG/DL
HCT VFR BLD AUTO: 24.2 %
HGB BLD-MCNC: 7.8 G/DL
INR PPP: 1.2
LYMPHOCYTES # BLD AUTO: 2 K/UL
LYMPHOCYTES NFR BLD: 39.1 %
MAGNESIUM SERPL-MCNC: 1.8 MG/DL
MCH RBC QN AUTO: 30.8 PG
MCHC RBC AUTO-ENTMCNC: 32.2 %
MCV RBC AUTO: 96 FL
MONOCYTES # BLD AUTO: 0.6 K/UL
MONOCYTES NFR BLD: 12.1 %
NEUTROPHILS # BLD AUTO: 2.2 K/UL
NEUTROPHILS NFR BLD: 42.6 %
PHOSPHATE SERPL-MCNC: 2.4 MG/DL
PLATELET # BLD AUTO: 160 K/UL
PMV BLD AUTO: 10.3 FL
POCT GLUCOSE: 143 MG/DL (ref 70–110)
POCT GLUCOSE: 150 MG/DL (ref 70–110)
POCT GLUCOSE: 157 MG/DL (ref 70–110)
POCT GLUCOSE: 173 MG/DL (ref 70–110)
POCT GLUCOSE: 192 MG/DL (ref 70–110)
POTASSIUM SERPL-SCNC: 3.4 MMOL/L
PROT SERPL-MCNC: 5.6 G/DL
PROTHROMBIN TIME: 12.1 SEC
RBC # BLD AUTO: 2.53 M/UL
SODIUM SERPL-SCNC: 135 MMOL/L
WBC # BLD AUTO: 5.04 K/UL

## 2017-04-19 PROCEDURE — 99232 SBSQ HOSP IP/OBS MODERATE 35: CPT | Mod: ,,, | Performed by: NURSE PRACTITIONER

## 2017-04-19 PROCEDURE — 63600175 PHARM REV CODE 636 W HCPCS: Performed by: STUDENT IN AN ORGANIZED HEALTH CARE EDUCATION/TRAINING PROGRAM

## 2017-04-19 PROCEDURE — 25000003 PHARM REV CODE 250: Performed by: PHYSICIAN ASSISTANT

## 2017-04-19 PROCEDURE — 20600001 HC STEP DOWN PRIVATE ROOM

## 2017-04-19 PROCEDURE — 25000003 PHARM REV CODE 250: Performed by: SURGERY

## 2017-04-19 PROCEDURE — 84100 ASSAY OF PHOSPHORUS: CPT

## 2017-04-19 PROCEDURE — 80053 COMPREHEN METABOLIC PANEL: CPT

## 2017-04-19 PROCEDURE — 83735 ASSAY OF MAGNESIUM: CPT

## 2017-04-19 PROCEDURE — 25000003 PHARM REV CODE 250: Performed by: STUDENT IN AN ORGANIZED HEALTH CARE EDUCATION/TRAINING PROGRAM

## 2017-04-19 PROCEDURE — 85610 PROTHROMBIN TIME: CPT

## 2017-04-19 PROCEDURE — 85025 COMPLETE CBC W/AUTO DIFF WBC: CPT

## 2017-04-19 RX ORDER — ENOXAPARIN SODIUM 100 MG/ML
40 INJECTION SUBCUTANEOUS EVERY 24 HOURS
Status: DISCONTINUED | OUTPATIENT
Start: 2017-04-19 | End: 2017-04-20 | Stop reason: HOSPADM

## 2017-04-19 RX ORDER — POTASSIUM CHLORIDE 7.45 MG/ML
10 INJECTION INTRAVENOUS
Status: DISCONTINUED | OUTPATIENT
Start: 2017-04-19 | End: 2017-04-19

## 2017-04-19 RX ORDER — MAGNESIUM SULFATE HEPTAHYDRATE 40 MG/ML
2 INJECTION, SOLUTION INTRAVENOUS ONCE
Status: DISCONTINUED | OUTPATIENT
Start: 2017-04-19 | End: 2017-04-19

## 2017-04-19 RX ORDER — SODIUM,POTASSIUM PHOSPHATES 280-250MG
2 POWDER IN PACKET (EA) ORAL ONCE
Status: COMPLETED | OUTPATIENT
Start: 2017-04-19 | End: 2017-04-19

## 2017-04-19 RX ADMIN — Medication 10 ML: at 12:04

## 2017-04-19 RX ADMIN — PANTOPRAZOLE SODIUM 40 MG: 40 TABLET, DELAYED RELEASE ORAL at 10:04

## 2017-04-19 RX ADMIN — HYDROCODONE BITARTRATE AND ACETAMINOPHEN 1 TABLET: 5; 325 TABLET ORAL at 11:04

## 2017-04-19 RX ADMIN — Medication 1 CAPSULE: at 10:04

## 2017-04-19 RX ADMIN — Medication 3 ML: at 06:04

## 2017-04-19 RX ADMIN — HYDROCODONE BITARTRATE AND ACETAMINOPHEN 1 TABLET: 5; 325 TABLET ORAL at 08:04

## 2017-04-19 RX ADMIN — Medication 3 ML: at 10:04

## 2017-04-19 RX ADMIN — Medication 10 ML: at 02:04

## 2017-04-19 RX ADMIN — MAGNESIUM OXIDE TAB 400 MG (241.3 MG ELEMENTAL MG) 400 MG: 400 (241.3 MG) TAB at 10:04

## 2017-04-19 RX ADMIN — LEVOTHYROXINE SODIUM 88 MCG: 88 TABLET ORAL at 06:04

## 2017-04-19 RX ADMIN — Medication 10 ML: at 06:04

## 2017-04-19 RX ADMIN — HYDROCODONE BITARTRATE AND ACETAMINOPHEN 1 TABLET: 5; 325 TABLET ORAL at 12:04

## 2017-04-19 RX ADMIN — DIPHENHYDRAMINE HYDROCHLORIDE 25 MG: 25 CAPSULE ORAL at 12:04

## 2017-04-19 RX ADMIN — POTASSIUM & SODIUM PHOSPHATES POWDER PACK 280-160-250 MG 2 PACKET: 280-160-250 PACK at 02:04

## 2017-04-19 RX ADMIN — ENOXAPARIN SODIUM 40 MG: 100 INJECTION SUBCUTANEOUS at 06:04

## 2017-04-19 RX ADMIN — Medication 3 ML: at 02:04

## 2017-04-19 RX ADMIN — DIPHENHYDRAMINE HYDROCHLORIDE 25 MG: 25 CAPSULE ORAL at 11:04

## 2017-04-19 NOTE — ASSESSMENT & PLAN NOTE
"Repeat US shows "No evidence of acute DVT in the right upper extremity."  Will dc therapeutic heparin, restart lovenox  "

## 2017-04-19 NOTE — PLAN OF CARE
OPAL has placed PASRR on the Chart for Md to sign.  OPAL has notified Dr. Larry Mc of needed signature.      Marck Zhao, JOO  [phone]

## 2017-04-19 NOTE — PLAN OF CARE
Problem: Patient Care Overview  Goal: Plan of Care Review  Outcome: Ongoing (interventions implemented as appropriate)  POC reviewed with patient. VSS. Patient's pain treated with PRN pain medications. Denied nausea; G tube clamped. Tolerating small amounts of diet. Accuchecks completed. Ambulating to bedside commode. Up in chair during shift. ABD dressings changed PRN. Patient remained free from falls and trauma. Call light in reach. [facility]

## 2017-04-19 NOTE — PLAN OF CARE
Problem: Patient Care Overview  Goal: Plan of Care Review  Outcome: Ongoing (interventions implemented as appropriate)  POC reviewed with patient, who verbalized understanding. AAOx4. Remains free of falls and injury. VSS. Abdominal ML ROSANNE, with exception of top of incision open, swallow opening replaced gauze with gauze and tape one time last night. Post KOURTNEY drain site replaced dressing with gauze and tape, sanguineous drainage. G tube, clamped, suture not intact, MD aware, split gauze in place and tube taped to abdomen. Tolerating Low fib/res, but low appetite. Denies nausea, mild pain controlled with PRN meds. Voiding per BC. No BMs. Blood Glucose monitored ACHS. SCDs in place. No acute events. No distress noted. Call bell in reach. Patient is feeling more lively, but is concerned about leaving today. She and family do not want to go to [place].  Will continue to monitor.

## 2017-04-19 NOTE — SUBJECTIVE & OBJECTIVE
"Interval HPI:   Overnight events: FBG on AM labs at goal without basal insulin. Prandial BG at/near goal with scheduled Novolog 3 units AC. Possible discharge to SNF or home today.   Eatin-50% low fiber diet.   Nausea: No  Hypoglycemia and intervention: No  Fever: No  TPN and/or TF: No    BP (!) 126/57 (BP Location: Left arm, Patient Position: Lying, BP Method: Automatic)  Pulse 77  Temp 98.6 °F (37 °C) (Oral)   Resp 16  Ht 5' 4" (1.626 m)  Wt 69.9 kg (154 lb)  LMP  (LMP Unknown)  SpO2 98%  Breastfeeding? No  BMI 26.43 kg/m2    Labs Reviewed and Include      Recent Labs  Lab 17  0352   *   CALCIUM 7.6*   ALBUMIN 2.2*   PROT 5.6*   *   K 3.4*   CO2 23      BUN 7*   CREATININE 0.7   ALKPHOS 55   ALT 17   AST 26   BILITOT 0.5     Lab Results   Component Value Date    WBC 5.04 2017    HGB 7.8 (L) 2017    HCT 24.2 (L) 2017    MCV 96 2017     2017       Recent Labs  Lab 04/15/17  0332   TSH 23.093*   FREET4 0.47*     Lab Results   Component Value Date    HGBA1C 6.6 (H) 2017       Nutritional status:   Body mass index is 26.43 kg/(m^2).  Lab Results   Component Value Date    ALBUMIN 2.2 (L) 2017    ALBUMIN 2.1 (L) 2017    ALBUMIN 2.1 (L) 2017     Lab Results   Component Value Date    PREALBUMIN 14 (L) 2017    PREALBUMIN 9 (L) 2017    PREALBUMIN 7 (L) 04/10/2017       Estimated Creatinine Clearance: 67.7 mL/min (based on Cr of 0.7).    Accu-Checks  Recent Labs      17   0920  17   1214  17   1740  17   0007  17   0651  17   0905  17   1230  17   1755  17   0003  17   0744   POCTGLUCOSE  178*  162*  136*  211*  159*  175*  188*  129*  192*  173*       Current Medications and/or Treatments Impacting Glycemic Control  Immunotherapy:  Immunosuppressants     None        Steroids:   Hormones     None        Pressors:    Autonomic Drugs     None    "     Hyperglycemia/Diabetes Medications: Antihyperglycemics     Start     Stop Route Frequency Ordered    04/18/17 0955  insulin aspart pen 0-5 Units      -- SubQ Before meals & nightly PRN 04/18/17 0857    04/18/17 1130  insulin aspart pen 3 Units      -- SubQ 3 times daily with meals 04/18/17 0857

## 2017-04-19 NOTE — PLAN OF CARE
Dr MAY Mc, , resident, has requested OPAL to contact DON for [facility] to discuss need for SNF.  OPAL attempted to contact RENEA but DON was gone for the day.  OPAL spoke w/Danya Finley, admission director, who states she will notified [facility]ENEA in the am of resident's call.  OPAL gave Danya Mc pgr #[phone].      OPAL attempted to contact Hollie Lange, admission coordinator for [facility], but she was unavailable.  OPAL left a [facility] ([phone]) for a return call regarding dispo on admission.    Marck Zhao, OPAL  [phone]

## 2017-04-19 NOTE — ASSESSMENT & PLAN NOTE
BG goal 140-180 while hospitalized.   Continue scheduled Novolog to 3 units TID AC if patient eat 50% or more of her meal.   Continue BG monitoring to AC/HS with low dose correction scale.   Endocrine will sign off. Thank you for this consult. Please re-consult if needed.       DISCHARGE PLANNING - Possible discharge today to SNF or home.   Recommend to discharge to SNF: either restart Metformin home dose or continue scheduled Novolog 3 units TID AC while in SNF. Restart Metformin at home.   Discussed with pt and daughter A1c in DM range since 11/2016. Continue Metformin home dose (as long as kidney and liver function WNL on discharge).   Has Metformin and BG meter and supplies at home.   F/u with PCP for DM management.

## 2017-04-19 NOTE — TELEPHONE ENCOUNTER
----- Message from Israel Mclean sent at 4/19/2017 11:31 AM CDT -----  Per Estefany in case management pt needs to be scheduled for 2 wk po//please call pt back at [phone]//thank you

## 2017-04-19 NOTE — ASSESSMENT & PLAN NOTE
SSI. Endo following.  Appreciate assistance  BG goal 140-180  novolog 3u TID AC if patient eats more than 50% of her meal

## 2017-04-19 NOTE — PROGRESS NOTES
[facility]  General Surgery  Progress Note    Subjective:     History of Present Illness:   71 yo female who presented with SBO. Now POD18 from Ex lap, TIFFANY and G tube placement.     Post-Op Info:  Procedure(s) (LRB):  EXPLORATORY-LAPAROTOMY (N/A)  REPAIR  LYSIS-ADHESION (N/A)  PLACEMENT   18 Days Post-Op     Interval History:   Patient seen and examined, no acute events overnight. Multiple bowel movements, none bloody. Tolerating reg diet with no N/V - g-tube clamped; H/H stable    Medications:  Continuous Infusions:   sodium chloride 0.9% 50 mL/hr at 04/18/17 2237     Scheduled Meds:   insulin aspart  3 Units Subcutaneous TIDWM    Lactobacillus rhamnosus GG  1 capsule Oral Daily    levothyroxine  88 mcg Oral Before breakfast    magnesium oxide  400 mg Oral Daily    pantoprazole  40 mg Oral Daily    polyethylene glycol  17 g Oral Daily    senna-docusate 8.6-50 mg  1 tablet Oral Daily    sodium chloride 0.9%  10 mL Intravenous Q6H    sodium chloride 0.9%  3 mL Intravenous Q8H     PRN Meds:sodium chloride, sodium chloride, sodium chloride, cyclobenzaprine, dextrose 50%, dextrose 50%, diphenhydrAMINE, diphenhydrAMINE, glucagon (human recombinant), glucose, glucose, hydrocodone-acetaminophen 10-325mg, hydrocodone-acetaminophen 5-325mg, insulin aspart, ondansetron, promethazine (PHENERGAN) IVPB, Flushing PICC Protocol **AND** sodium chloride 0.9% **AND** sodium chloride 0.9%     Review of patient's allergies indicates:   Allergen Reactions    Codeine Nausea And Vomiting     Other reaction(s): Itching    Percocet  [oxycodone-acetaminophen]      Other reaction(s): Itching    Sulfa (sulfonamide antibiotics)      Other reaction(s): Nausea  Other reaction(s): Itching    Adhesive Rash     Objective:     Vital Signs (Most Recent):  Temp: 98.6 °F (37 °C) (04/19/17 0503)  Pulse: 77 (04/19/17 0503)  Resp: 16 (04/19/17 0503)  BP: (!) 126/57 (04/19/17 0503)  SpO2: 98 % (04/19/17 0503) Vital Signs  (24h Range):  Temp:  [97.4 °F (36.3 °C)-98.6 °F (37 °C)] 98.6 °F (37 °C)  Pulse:  [71-77] 77  Resp:  [16-18] 16  SpO2:  [96 %-98 %] 98 %  BP: (108-126)/(53-60) 126/57     Weight: 69.9 kg (154 lb)  Body mass index is 26.43 kg/(m^2).    Intake/Output - Last 3 Shifts       04/17 0700 - 04/18 0659 04/18 0700 - 04/19 0659 04/19 0700 - 04/20 0659    P.O. 240 600     I.V. (mL/kg) 1375 (19.7) 400 (5.7)     Blood 282.5      NG/GT 40 40     Total Intake(mL/kg) 1937.5 (27.7) 1040 (14.9)     Urine (mL/kg/hr) 800 (0.5) 1250 (0.7)     Emesis/NG output       Drains 350 (0.2)      Other       Stool 0 (0) 0 (0)     Total Output 1150 1250      Net +787.5 -210             Urine Occurrence 5 x 5 x     Stool Occurrence 2 x 6 x           Physical Exam   Constitutional: She is oriented to person, place, and time. She appears well-developed and well-nourished. No distress.   HENT:   Head: Normocephalic and atraumatic.   Cardiovascular: Normal rate and regular rhythm.    Pulmonary/Chest: Effort normal. No respiratory distress.   Abdominal:   Soft, NTND, appropriate TTP, incision - staples removed, clean, dry and intact  Previous KOURTNEY drain site dressed   Neurological: She is alert and oriented to person, place, and time.   Skin: Skin is warm.   Psychiatric: She has a normal mood and affect.       Significant Labs:  CBC:   Recent Labs  Lab 04/19/17  0352   WBC 5.04   RBC 2.53*   HGB 7.8*   HCT 24.2*      MCV 96   MCH 30.8   MCHC 32.2     BMP:   Recent Labs  Lab 04/19/17  0352   *   *   K 3.4*      CO2 23   BUN 7*   CREATININE 0.7   CALCIUM 7.6*   MG 1.8     CMP:   Recent Labs  Lab 04/19/17  0352   *   CALCIUM 7.6*   ALBUMIN 2.2*   PROT 5.6*   *   K 3.4*   CO2 23      BUN 7*   CREATININE 0.7   ALKPHOS 55   ALT 17   AST 26   BILITOT 0.5     LFTs:   Recent Labs  Lab 04/19/17 0352   ALT 17   AST 26   ALKPHOS 55   BILITOT 0.5   PROT 5.6*   ALBUMIN 2.2*     Coagulation:   Recent Labs  Lab 04/19/17 0352  "  INR 1.2     Assessment/Plan:     * SBO (small bowel obstruction)  S/p ex lap, TIFFANY, G tube placement  G tube clamped - will stay in place approx 6 weeks from surgery date  Advance to low residue diet   HLIV  Continue bowel regimen - miralax and senna/colace daily  Nausea/pain meds PRN    Cervical spondylosis with radiculopathy  Home meds    Acid reflux  PO protonix     Hypothyroid  Continue home meds  Endo following, appreciate assistance    Type 2 diabetes mellitus without complication, without long-term current use of insulin  SSI. Endo following.  Appreciate assistance  BG goal 140-180  novolog 3u TID AC if patient eats more than 50% of her meal    Acute deep vein thrombosis (DVT) of upper extremity  Repeat US shows "No evidence of acute DVT in the right upper extremity."  Will dc therapeutic heparin, restart lovenox    Wound infection after surgery  S/p opening incision. Erythema resolved  Continuing daily packing changes   No abx   Afebrile, no leukocytosis     Hypomagnesemia  Replace    GI bleed  Most likely diverticular bleed   No further bloody bowel movement   H/H stable  Will continue to trend cbc  Continue regular diet    Hypokalemia  Replace    Hypophosphatemia  Replace    Hyponatremia  Monitor  Continue regular diet    Prophylaxis  DVT/GI    Dispo  PT/OT recommends home health; patient lives alone and does not feel capable of going home alone. Patient's first choice SNF denied; patient refuses O-SNF. Will work on SNF and home health for patient. Stable for discharge 4/19/17.      Sanjuana Redding PA-C   [id number]  General Surgery  [facility]  "

## 2017-04-19 NOTE — ASSESSMENT & PLAN NOTE
Resolving, no bloody or dark BM yesterday.   Colorectal surgery following. No plans for colonoscopy at this time.

## 2017-04-19 NOTE — PLAN OF CARE
OPAL spoke w/Danya Finley, for [facility], who has requested updated orders from Yesterday and today.  OPAL faxed through [facility] fax system for re-consideration.    Marck Zhao, JOO  [phone]

## 2017-04-19 NOTE — ASSESSMENT & PLAN NOTE
Most likely diverticular bleed   No further bloody bowel movement   H/H stable  Will continue to trend cbc  Continue regular diet

## 2017-04-19 NOTE — PLAN OF CARE
CM/OPAL met w/pt/dgr to discuss dc plan as dgr is adamant about going to [facility] as pt has been denied by the medical team on 2 occasions.  Dgr  States she doesn't want 2nd choice ([facility]) and would go w/plan B (home health) if denied again by [facility].  Pt h/h preference is [facility].  Resource guide has been given to dgr for in home care services.  SW has left a sg for Danya Finley to revisit pt for SNF.    Marck Zhao, JOO  [id number]

## 2017-04-19 NOTE — ASSESSMENT & PLAN NOTE
S/p ex lap, TIFFANY, G tube placement  G tube clamped - will stay in place approx 6 weeks from surgery date  Advance to low residue diet   HLIV  Continue bowel regimen - miralax and senna/colace daily  Nausea/pain meds PRN

## 2017-04-19 NOTE — PROGRESS NOTES
"[facility]  Endocrinology  Progress Note    Admit Date: 3/30/2017     Reason for Consult: Management of T2DM, Hyperglycemia     Surgical Procedure and Date: EXPLORATORY-LAPAROTOMY (N/A)  REPAIR  LYSIS-ADHESION (N/A)  G-TUBE PLACEMENT on 17    Diabetes diagnosis year: PreDM for a "few years" per family report, DM on labs since 2016    Home Diabetes Medications:  Metformin 750 mg BID     How often checking glucose at home? One   BG readings on regimen: <140  Hypoglycemia on the regimen?  No  Missed doses on regimen?  No      HPI:   Patient is a 72 y.o. female with a diagnosis of Pre-DM per daughter's report. However, A1c upon admission 6.6% and has been in DM range since 2016. Followed by PCP.    Chronic medical conditions include: remote colon CA (s/p colon resection), SBO 2/2 adhesions, T2DM, GERD, fatty liver, HLD, and  hypothyroidism.     Patient presented to ED with abdominal discomfort. Admitted for treatment of SBO. Now, s/p ex-lap with adhesion repair on 17  Endocrine consulted for BG management.       Interval HPI:   Overnight events: FBG on AM labs at goal without basal insulin. Prandial BG at/near goal with scheduled Novolog 3 units AC. Possible discharge to SNF or home today.   Eatin-50% low fiber diet.   Nausea: No  Hypoglycemia and intervention: No  Fever: No  TPN and/or TF: No    BP (!) 126/57 (BP Location: Left arm, Patient Position: Lying, BP Method: Automatic)  Pulse 77  Temp 98.6 °F (37 °C) (Oral)   Resp 16  Ht 5' 4" (1.626 m)  Wt 69.9 kg (154 lb)  LMP  (LMP Unknown)  SpO2 98%  Breastfeeding? No  BMI 26.43 kg/m2    Labs Reviewed and Include      Recent Labs  Lab 17  0352   *   CALCIUM 7.6*   ALBUMIN 2.2*   PROT 5.6*   *   K 3.4*   CO2 23      BUN 7*   CREATININE 0.7   ALKPHOS 55   ALT 17   AST 26   BILITOT 0.5     Lab Results   Component Value Date    WBC 5.04 2017    HGB 7.8 (L) 2017    HCT 24.2 (L) 2017    " MCV 96 04/19/2017     04/19/2017       Recent Labs  Lab 04/15/17  0332   TSH 23.093*   FREET4 0.47*     Lab Results   Component Value Date    HGBA1C 6.6 (H) 03/30/2017       Nutritional status:   Body mass index is 26.43 kg/(m^2).  Lab Results   Component Value Date    ALBUMIN 2.2 (L) 04/19/2017    ALBUMIN 2.1 (L) 04/18/2017    ALBUMIN 2.1 (L) 04/17/2017     Lab Results   Component Value Date    PREALBUMIN 14 (L) 04/17/2017    PREALBUMIN 9 (L) 04/13/2017    PREALBUMIN 7 (L) 04/10/2017       Estimated Creatinine Clearance: 67.7 mL/min (based on Cr of 0.7).    Accu-Checks  Recent Labs      04/17/17   0920  04/17/17   1214  04/17/17   1740  04/18/17   0007  04/18/17   0651  04/18/17   0905  04/18/17   1230  04/18/17   1755  04/19/17   0003  04/19/17   0744   POCTGLUCOSE  178*  162*  136*  211*  159*  175*  188*  129*  192*  173*       Current Medications and/or Treatments Impacting Glycemic Control  Immunotherapy:  Immunosuppressants     None        Steroids:   Hormones     None        Pressors:    Autonomic Drugs     None        Hyperglycemia/Diabetes Medications: Antihyperglycemics     Start     Stop Route Frequency Ordered    04/18/17 0955  insulin aspart pen 0-5 Units      -- SubQ Before meals & nightly PRN 04/18/17 0857    04/18/17 1130  insulin aspart pen 3 Units      -- SubQ 3 times daily with meals 04/18/17 0857          ASSESSMENT and PLAN    Type 2 diabetes mellitus without complication, without long-term current use of insulin  BG goal 140-180 while hospitalized.   Continue scheduled Novolog to 3 units TID AC if patient eat 50% or more of her meal.   Continue BG monitoring to AC/HS with low dose correction scale.   Endocrine will sign off. Thank you for this consult. Please re-consult if needed.       DISCHARGE PLANNING - Possible discharge today to SNF or home.   Recommend to discharge to SNF: Either restart Metformin home dose or continue scheduled Novolog 3 units TID AC while in SNF. Restart  Metformin at home.    Discussed with pt and daughter A1c in DM range since 11/2016. Continue Metformin home dose (as long as kidney and liver function WNL on discharge).   Has Metformin and BG meter and supplies at home.   F/u with PCP for DM management.    * SBO (small bowel obstruction)  Per surgery s/p ex-lap      History of colon cancer  Noted       Hypothyroid    Lab Results   Component Value Date    TSH 23.093 (H) 04/15/2017     On home dose of synthroid 88 mcg PO daily. Recommend to repeat TSH in 6-8 weeks.        GI bleed  Resolving, no bloody or dark BM yesterday.   Colorectal surgery following. No plans for colonoscopy at this time.          Hollie Nam NP  Endocrinology  [facility]

## 2017-04-19 NOTE — PLAN OF CARE
OPAL spoke w/Danya Finley, admission director for [facility], that pt has been denied for a 3rd time.  OPAL notified misti Zuluaga of denial.  Margareth reports that pt h/h is trending down; it is currently 7.8.  Per Margraeth, general Practitioner (Margaret Pallatera) states pt is not ready for discharge.  OPAL has pg'd Dr. Larry Mc to discuss.    Marck Zhao, [facility]  [phone]

## 2017-04-19 NOTE — SUBJECTIVE & OBJECTIVE
Interval History:   Patient seen and examined, no acute events overnight. Multiple bowel movements, none bloody. Tolerating reg diet with no N/V - g-tube clamped; H/H stable    Medications:  Continuous Infusions:   sodium chloride 0.9% 50 mL/hr at 04/18/17 2237     Scheduled Meds:   insulin aspart  3 Units Subcutaneous TIDWM    Lactobacillus rhamnosus GG  1 capsule Oral Daily    levothyroxine  88 mcg Oral Before breakfast    magnesium oxide  400 mg Oral Daily    pantoprazole  40 mg Oral Daily    polyethylene glycol  17 g Oral Daily    senna-docusate 8.6-50 mg  1 tablet Oral Daily    sodium chloride 0.9%  10 mL Intravenous Q6H    sodium chloride 0.9%  3 mL Intravenous Q8H     PRN Meds:sodium chloride, sodium chloride, sodium chloride, cyclobenzaprine, dextrose 50%, dextrose 50%, diphenhydrAMINE, diphenhydrAMINE, glucagon (human recombinant), glucose, glucose, hydrocodone-acetaminophen 10-325mg, hydrocodone-acetaminophen 5-325mg, insulin aspart, ondansetron, promethazine (PHENERGAN) IVPB, Flushing PICC Protocol **AND** sodium chloride 0.9% **AND** sodium chloride 0.9%     Review of patient's allergies indicates:   Allergen Reactions    Codeine Nausea And Vomiting     Other reaction(s): Itching    Percocet  [oxycodone-acetaminophen]      Other reaction(s): Itching    Sulfa (sulfonamide antibiotics)      Other reaction(s): Nausea  Other reaction(s): Itching    Adhesive Rash     Objective:     Vital Signs (Most Recent):  Temp: 98.6 °F (37 °C) (04/19/17 0503)  Pulse: 77 (04/19/17 0503)  Resp: 16 (04/19/17 0503)  BP: (!) 126/57 (04/19/17 0503)  SpO2: 98 % (04/19/17 0503) Vital Signs (24h Range):  Temp:  [97.4 °F (36.3 °C)-98.6 °F (37 °C)] 98.6 °F (37 °C)  Pulse:  [71-77] 77  Resp:  [16-18] 16  SpO2:  [96 %-98 %] 98 %  BP: (108-126)/(53-60) 126/57     Weight: 69.9 kg (154 lb)  Body mass index is 26.43 kg/(m^2).    Intake/Output - Last 3 Shifts       04/17 0700 - 04/18 0659 04/18 0700 - 04/19 0659 04/19 0700 -  04/20 0659    P.O. 240 600     I.V. (mL/kg) 1375 (19.7) 400 (5.7)     Blood 282.5      NG/GT 40 40     Total Intake(mL/kg) 1937.5 (27.7) 1040 (14.9)     Urine (mL/kg/hr) 800 (0.5) 1250 (0.7)     Emesis/NG output       Drains 350 (0.2)      Other       Stool 0 (0) 0 (0)     Total Output 1150 1250      Net +787.5 -210             Urine Occurrence 5 x 5 x     Stool Occurrence 2 x 6 x           Physical Exam   Constitutional: She is oriented to person, place, and time. She appears well-developed and well-nourished. No distress.   HENT:   Head: Normocephalic and atraumatic.   Cardiovascular: Normal rate and regular rhythm.    Pulmonary/Chest: Effort normal. No respiratory distress.   Abdominal:   Soft, NTND, appropriate TTP, incision - staples removed, clean, dry and intact  Previous KOURTNEY drain site dressed   Neurological: She is alert and oriented to person, place, and time.   Skin: Skin is warm.   Psychiatric: She has a normal mood and affect.       Significant Labs:  CBC:   Recent Labs  Lab 04/19/17 0352   WBC 5.04   RBC 2.53*   HGB 7.8*   HCT 24.2*      MCV 96   MCH 30.8   MCHC 32.2     BMP:   Recent Labs  Lab 04/19/17 0352   *   *   K 3.4*      CO2 23   BUN 7*   CREATININE 0.7   CALCIUM 7.6*   MG 1.8     CMP:   Recent Labs  Lab 04/19/17 0352   *   CALCIUM 7.6*   ALBUMIN 2.2*   PROT 5.6*   *   K 3.4*   CO2 23      BUN 7*   CREATININE 0.7   ALKPHOS 55   ALT 17   AST 26   BILITOT 0.5     LFTs:   Recent Labs  Lab 04/19/17 0352   ALT 17   AST 26   ALKPHOS 55   BILITOT 0.5   PROT 5.6*   ALBUMIN 2.2*     Coagulation:   Recent Labs  Lab 04/19/17 0352   INR 1.2

## 2017-04-20 VITALS
WEIGHT: 154 LBS | OXYGEN SATURATION: 95 % | RESPIRATION RATE: 18 BRPM | TEMPERATURE: 100 F | BODY MASS INDEX: 26.29 KG/M2 | SYSTOLIC BLOOD PRESSURE: 116 MMHG | HEART RATE: 70 BPM | DIASTOLIC BLOOD PRESSURE: 59 MMHG | HEIGHT: 64 IN

## 2017-04-20 PROBLEM — T14.8XXA HEMATOMA: Status: ACTIVE | Noted: 2017-04-20

## 2017-04-20 LAB
ALBUMIN SERPL BCP-MCNC: 2.2 G/DL
ALBUMIN SERPL BCP-MCNC: 2.2 G/DL
ALP SERPL-CCNC: 64 U/L
ALP SERPL-CCNC: 64 U/L
ALT SERPL W/O P-5'-P-CCNC: 23 U/L
ALT SERPL W/O P-5'-P-CCNC: 23 U/L
ANION GAP SERPL CALC-SCNC: 7 MMOL/L
ANION GAP SERPL CALC-SCNC: 7 MMOL/L
AST SERPL-CCNC: 27 U/L
AST SERPL-CCNC: 27 U/L
BASOPHILS # BLD AUTO: 0.03 K/UL
BASOPHILS NFR BLD: 0.6 %
BILIRUB SERPL-MCNC: 0.9 MG/DL
BILIRUB SERPL-MCNC: 0.9 MG/DL
BUN SERPL-MCNC: 4 MG/DL
BUN SERPL-MCNC: 4 MG/DL
CALCIUM SERPL-MCNC: 8 MG/DL
CALCIUM SERPL-MCNC: 8 MG/DL
CHLORIDE SERPL-SCNC: 106 MMOL/L
CHLORIDE SERPL-SCNC: 106 MMOL/L
CO2 SERPL-SCNC: 22 MMOL/L
CO2 SERPL-SCNC: 22 MMOL/L
CREAT SERPL-MCNC: 0.8 MG/DL
CREAT SERPL-MCNC: 0.8 MG/DL
DIFFERENTIAL METHOD: ABNORMAL
EOSINOPHIL # BLD AUTO: 0.2 K/UL
EOSINOPHIL NFR BLD: 4 %
ERYTHROCYTE [DISTWIDTH] IN BLOOD BY AUTOMATED COUNT: 16.5 %
EST. GFR  (AFRICAN AMERICAN): >60 ML/MIN/1.73 M^2
EST. GFR  (AFRICAN AMERICAN): >60 ML/MIN/1.73 M^2
EST. GFR  (NON AFRICAN AMERICAN): >60 ML/MIN/1.73 M^2
EST. GFR  (NON AFRICAN AMERICAN): >60 ML/MIN/1.73 M^2
GLUCOSE SERPL-MCNC: 133 MG/DL
GLUCOSE SERPL-MCNC: 133 MG/DL
HCT VFR BLD AUTO: 26.6 %
HGB BLD-MCNC: 8.5 G/DL
LYMPHOCYTES # BLD AUTO: 1.9 K/UL
LYMPHOCYTES NFR BLD: 36.2 %
MAGNESIUM SERPL-MCNC: 1.5 MG/DL
MAGNESIUM SERPL-MCNC: 1.5 MG/DL
MCH RBC QN AUTO: 30.9 PG
MCHC RBC AUTO-ENTMCNC: 32 %
MCV RBC AUTO: 97 FL
MONOCYTES # BLD AUTO: 0.7 K/UL
MONOCYTES NFR BLD: 13.3 %
NEUTROPHILS # BLD AUTO: 2.4 K/UL
NEUTROPHILS NFR BLD: 45.7 %
PHOSPHATE SERPL-MCNC: 2.9 MG/DL
PHOSPHATE SERPL-MCNC: 2.9 MG/DL
PLATELET # BLD AUTO: 179 K/UL
PMV BLD AUTO: 10.1 FL
POCT GLUCOSE: 155 MG/DL (ref 70–110)
POCT GLUCOSE: 156 MG/DL (ref 70–110)
POTASSIUM SERPL-SCNC: 3.6 MMOL/L
POTASSIUM SERPL-SCNC: 3.6 MMOL/L
PREALB SERPL-MCNC: 13 MG/DL
PROT SERPL-MCNC: 6 G/DL
PROT SERPL-MCNC: 6 G/DL
RBC # BLD AUTO: 2.75 M/UL
SODIUM SERPL-SCNC: 135 MMOL/L
SODIUM SERPL-SCNC: 135 MMOL/L
WBC # BLD AUTO: 5.28 K/UL

## 2017-04-20 PROCEDURE — 25000003 PHARM REV CODE 250: Performed by: SURGERY

## 2017-04-20 PROCEDURE — 80053 COMPREHEN METABOLIC PANEL: CPT

## 2017-04-20 PROCEDURE — 25000003 PHARM REV CODE 250: Performed by: STUDENT IN AN ORGANIZED HEALTH CARE EDUCATION/TRAINING PROGRAM

## 2017-04-20 PROCEDURE — 63600175 PHARM REV CODE 636 W HCPCS: Performed by: STUDENT IN AN ORGANIZED HEALTH CARE EDUCATION/TRAINING PROGRAM

## 2017-04-20 PROCEDURE — 83735 ASSAY OF MAGNESIUM: CPT

## 2017-04-20 PROCEDURE — 25000003 PHARM REV CODE 250: Performed by: PHYSICIAN ASSISTANT

## 2017-04-20 PROCEDURE — 63600175 PHARM REV CODE 636 W HCPCS: Performed by: SURGERY

## 2017-04-20 PROCEDURE — 85025 COMPLETE CBC W/AUTO DIFF WBC: CPT

## 2017-04-20 PROCEDURE — 97530 THERAPEUTIC ACTIVITIES: CPT

## 2017-04-20 PROCEDURE — 84134 ASSAY OF PREALBUMIN: CPT

## 2017-04-20 PROCEDURE — 84100 ASSAY OF PHOSPHORUS: CPT

## 2017-04-20 RX ORDER — HYDROCODONE BITARTRATE AND ACETAMINOPHEN 5; 325 MG/1; MG/1
1 TABLET ORAL EVERY 6 HOURS PRN
Qty: 41 TABLET | Refills: 0 | Status: SHIPPED | OUTPATIENT
Start: 2017-04-20 | End: 2017-05-15

## 2017-04-20 RX ORDER — DIPHENHYDRAMINE HCL 25 MG
25 CAPSULE ORAL NIGHTLY PRN
Refills: 0
Start: 2017-04-20 | End: 2020-08-11

## 2017-04-20 RX ORDER — HYDROCODONE BITARTRATE AND ACETAMINOPHEN 5; 325 MG/1; MG/1
1 TABLET ORAL EVERY 6 HOURS PRN
Refills: 0
Start: 2017-04-20 | End: 2017-04-20

## 2017-04-20 RX ORDER — AMOXICILLIN 250 MG
1 CAPSULE ORAL DAILY
Start: 2017-04-20 | End: 2017-10-31

## 2017-04-20 RX ORDER — MAGNESIUM SULFATE HEPTAHYDRATE 40 MG/ML
2 INJECTION, SOLUTION INTRAVENOUS ONCE
Status: DISCONTINUED | OUTPATIENT
Start: 2017-04-20 | End: 2017-04-20

## 2017-04-20 RX ORDER — POTASSIUM CHLORIDE 7.45 MG/ML
10 INJECTION INTRAVENOUS
Status: DISCONTINUED | OUTPATIENT
Start: 2017-04-20 | End: 2017-04-20

## 2017-04-20 RX ORDER — MAGNESIUM SULFATE HEPTAHYDRATE 40 MG/ML
2 INJECTION, SOLUTION INTRAVENOUS ONCE
Status: COMPLETED | OUTPATIENT
Start: 2017-04-20 | End: 2017-04-20

## 2017-04-20 RX ORDER — POLYETHYLENE GLYCOL 3350 17 G/17G
17 POWDER, FOR SOLUTION ORAL DAILY
Refills: 0
Start: 2017-04-20 | End: 2017-08-21

## 2017-04-20 RX ORDER — SODIUM,POTASSIUM PHOSPHATES 280-250MG
2 POWDER IN PACKET (EA) ORAL ONCE
Status: COMPLETED | OUTPATIENT
Start: 2017-04-20 | End: 2017-04-20

## 2017-04-20 RX ORDER — PANTOPRAZOLE SODIUM 40 MG/1
40 TABLET, DELAYED RELEASE ORAL DAILY
Start: 2017-04-20 | End: 2017-05-01 | Stop reason: SDUPTHER

## 2017-04-20 RX ORDER — TAMSULOSIN HYDROCHLORIDE 0.4 MG/1
CAPSULE ORAL
Status: DISCONTINUED
Start: 2017-04-20 | End: 2017-04-20 | Stop reason: HOSPADM

## 2017-04-20 RX ORDER — CYCLOBENZAPRINE HCL 5 MG
5 TABLET ORAL DAILY PRN
Start: 2017-04-20 | End: 2017-04-20 | Stop reason: HOSPADM

## 2017-04-20 RX ADMIN — ENOXAPARIN SODIUM 40 MG: 100 INJECTION SUBCUTANEOUS at 04:04

## 2017-04-20 RX ADMIN — PANTOPRAZOLE SODIUM 40 MG: 40 TABLET, DELAYED RELEASE ORAL at 10:04

## 2017-04-20 RX ADMIN — POTASSIUM & SODIUM PHOSPHATES POWDER PACK 280-160-250 MG 2 PACKET: 280-160-250 PACK at 06:04

## 2017-04-20 RX ADMIN — Medication 3 ML: at 04:04

## 2017-04-20 RX ADMIN — HYDROCODONE BITARTRATE AND ACETAMINOPHEN 1 TABLET: 5; 325 TABLET ORAL at 04:04

## 2017-04-20 RX ADMIN — LEVOTHYROXINE SODIUM 88 MCG: 88 TABLET ORAL at 05:04

## 2017-04-20 RX ADMIN — Medication 10 ML: at 04:04

## 2017-04-20 RX ADMIN — Medication 1 CAPSULE: at 10:04

## 2017-04-20 RX ADMIN — MAGNESIUM OXIDE TAB 400 MG (241.3 MG ELEMENTAL MG) 400 MG: 400 (241.3 MG) TAB at 10:04

## 2017-04-20 RX ADMIN — MAGNESIUM SULFATE IN WATER 2 G: 40 INJECTION, SOLUTION INTRAVENOUS at 06:04

## 2017-04-20 RX ADMIN — POLYETHYLENE GLYCOL 3350 17 G: 17 POWDER, FOR SOLUTION ORAL at 11:04

## 2017-04-20 RX ADMIN — Medication 3 ML: at 06:04

## 2017-04-20 RX ADMIN — ONDANSETRON 4 MG: 2 INJECTION INTRAMUSCULAR; INTRAVENOUS at 04:04

## 2017-04-20 RX ADMIN — Medication 10 ML: at 12:04

## 2017-04-20 RX ADMIN — Medication 10 ML: at 06:04

## 2017-04-20 NOTE — ASSESSMENT & PLAN NOTE
DANNY. Zachary signed off - she will follow up with them as outpatient  Appreciate assistance  BG goal 140-180  novolog 3u TID AC if patient eats more than 50% of her meal

## 2017-04-20 NOTE — ASSESSMENT & PLAN NOTE
S/p ex lap, TIFFANY, G tube placement  G tube clamped - will stay in place approx 6 weeks from surgery date  Continue low residue diet   Continue bowel regimen - miralax and senna/colace daily  Nausea/pain meds PRN

## 2017-04-20 NOTE — PLAN OF CARE
10:45am - OPAL spoke w/Danya Finley, admission director for [facility], who said again that pt has been denied admission by the medical team.    OPAL spoke w/Kimberly, P.O.A., who states that she would need to speak w/pt and siblings before she would decide on pt going to [facility] because of adjacent bathrooms & no private rooms. Kimberly's complaint about [facility] is availability of private rooms and the sharing of showers.  Kimberly also states that pt is not ready to discharge and they are refusing plan B @ this time  (home w/home health).  Kimberly has requested name of upper management as she doesn't think her mother is ready to be discharged.    SW contacted Hollie Lange, admission director for [facility], who states that she will speak w/DON regarding dispo on SNF admission.    11:00 am - Per Nuria, reviewed case based on PT/OT progressions note; pt doesn't meet for [facility] placement.    11:33 am - Per Vesta, admission nurse for [facility], pt has been denied because case is too medically unstable.    Marck Zhao, [facility]  [phone]

## 2017-04-20 NOTE — PT/OT/SLP PROGRESS
Physical Therapy      Anayeli Judd  MRN: [id number]    Patient  reported fatigue from sitting up in chair. Therapist answered patient and daughter's questions regarding functional mobility.  Therapist instructed patient in HEP for LE strength. Patient demonstrated a few reps of each exercise, but refused to participate in full therapy session at this time.  Will follow-up at next scheduled treatment session.    Ritika Philip, PT

## 2017-04-20 NOTE — SUBJECTIVE & OBJECTIVE
Interval History:   Patient seen and examined, no acute events overnight, +flatus, no BM, H/H rising; tolerating low fiber diet with no nausea/vomiting, otherwise afebrile/VSS    Medications:  Continuous Infusions:   Scheduled Meds:   enoxaparin  40 mg Subcutaneous Daily    insulin aspart  3 Units Subcutaneous TIDWM    Lactobacillus rhamnosus GG  1 capsule Oral Daily    levothyroxine  88 mcg Oral Before breakfast    magnesium oxide  400 mg Oral Daily    pantoprazole  40 mg Oral Daily    polyethylene glycol  17 g Oral Daily    senna-docusate 8.6-50 mg  1 tablet Oral Daily    sodium chloride 0.9%  10 mL Intravenous Q6H    sodium chloride 0.9%  3 mL Intravenous Q8H     PRN Meds:sodium chloride, sodium chloride, sodium chloride, cyclobenzaprine, dextrose 50%, dextrose 50%, diphenhydrAMINE, diphenhydrAMINE, glucagon (human recombinant), glucose, glucose, hydrocodone-acetaminophen 10-325mg, hydrocodone-acetaminophen 5-325mg, insulin aspart, ondansetron, promethazine (PHENERGAN) IVPB, Flushing PICC Protocol **AND** sodium chloride 0.9% **AND** sodium chloride 0.9%     Review of patient's allergies indicates:   Allergen Reactions    Codeine Nausea And Vomiting     Other reaction(s): Itching    Percocet  [oxycodone-acetaminophen]      Other reaction(s): Itching    Sulfa (sulfonamide antibiotics)      Other reaction(s): Nausea  Other reaction(s): Itching    Adhesive Rash     Objective:     Vital Signs (Most Recent):  Temp: 98 °F (36.7 °C) (04/20/17 0420)  Pulse: 68 (04/20/17 0700)  Resp: 16 (04/20/17 0700)  BP: (!) 111/56 (04/20/17 0700)  SpO2: 96 % (04/20/17 0420) Vital Signs (24h Range):  Temp:  [96.9 °F (36.1 °C)-98.4 °F (36.9 °C)] 98 °F (36.7 °C)  Pulse:  [67-78] 68  Resp:  [16-18] 16  SpO2:  [95 %-100 %] 96 %  BP: (107-124)/(51-70) 111/56     Weight: 69.9 kg (154 lb)  Body mass index is 26.43 kg/(m^2).    Intake/Output - Last 3 Shifts       04/18 0700 - 04/19 0659 04/19 0700 - 04/20 0659 04/20 0700 - 04/21  0659    P.O. 600 1300     I.V. (mL/kg) 400 (5.7) 327.5 (4.7)     Blood       NG/GT 40 20     Total Intake(mL/kg) 1040 (14.9) 1647.5 (23.6)     Urine (mL/kg/hr) 1250 (0.7) 600 (0.4)     Drains  0 (0)     Stool 0 (0)      Total Output 1250 600      Net -210 +1047.5             Urine Occurrence 5 x 5 x     Stool Occurrence 6 x 0 x           Physical Exam   Constitutional: She is oriented to person, place, and time. She appears well-developed and well-nourished. No distress.   HENT:   Head: Normocephalic and atraumatic.   Cardiovascular: Normal rate and regular rhythm.    Pulmonary/Chest: Effort normal. No respiratory distress.   Abdominal:   Soft, NTND, appropriate TTP, incision - clean, dry and intact; hematoma noted to middle of incision opened and packed this AM  Previous KOURTNEY drain site dressed   Neurological: She is alert and oriented to person, place, and time.   Skin: Skin is warm.   Psychiatric: She has a normal mood and affect.       Significant Labs:  CBC:   Recent Labs  Lab 04/20/17 0420   WBC 5.28   RBC 2.75*   HGB 8.5*   HCT 26.6*      MCV 97   MCH 30.9   MCHC 32.0     BMP:   Recent Labs  Lab 04/20/17  0420   *  133*   *  135*   K 3.6  3.6     106   CO2 22*  22*   BUN 4*  4*   CREATININE 0.8  0.8   CALCIUM 8.0*  8.0*   MG 1.5*  1.5*     CMP:   Recent Labs  Lab 04/20/17  0420   *  133*   CALCIUM 8.0*  8.0*   ALBUMIN 2.2*  2.2*   PROT 6.0  6.0   *  135*   K 3.6  3.6   CO2 22*  22*     106   BUN 4*  4*   CREATININE 0.8  0.8   ALKPHOS 64  64   ALT 23  23   AST 27  27   BILITOT 0.9  0.9     LFTs:   Recent Labs  Lab 04/20/17  0420   ALT 23  23   AST 27  27   ALKPHOS 64  64   BILITOT 0.9  0.9   PROT 6.0  6.0   ALBUMIN 2.2*  2.2*     Coagulation:   Recent Labs  Lab 04/19/17  0352   INR 1.2

## 2017-04-20 NOTE — PLAN OF CARE
SW faxed current labs as requested by Soni Wills (Hemoglobin/Hemacrit).    Marck Zhao, JOO  [phone]

## 2017-04-20 NOTE — PLAN OF CARE
10:22 am - SW contacted [facility] to speak w/admission director (Hollie Lange) for dispo on referral to SNF placement but SW had to leave a [facility].      SW spoke w/Larry Mc, resident, , (to get update on discussion w/DON @ [facility] but per Dr. Jesusita MEIER never returned call)who informed SW that if pt is denied w/[facility] then the doctors will decide the next step w/the family.    Marck Zhao, OPAL  [id number]

## 2017-04-20 NOTE — NURSING
Report called to KEVIN Zhao LPN at [facility] (phone #[phone]). Packet given to patients daughter to give to facility upon arrival.

## 2017-04-20 NOTE — PLAN OF CARE
Final Discharge Note:    Anastasiya, nurse [id number], is to hand off to Renée nurse ph#[phone]. Pt going to room P-115.  Pt will be transported by misti Zuluaga.  Packet given to  to give to misti to give to receiving facility.    Services completed.    Marck Zhao, OPAL  [phone]

## 2017-04-20 NOTE — PLAN OF CARE
OPAL has faxed the following documents to Danya Finley, admission director, #[phone]/f#[phone]: PASRR, 142 form, CXR, TB results, and Norco prescription.     OPAL notified you Zuluaga, in room that paperwork needs to be sign before admission.    Pt is expected to dc today after DON review Nursing Home Orders and give approval.    Marck Zhao, JOO  [id number]

## 2017-04-20 NOTE — PROGRESS NOTES
[facility]  General Surgery  Progress Note    Subjective:     History of Present Illness:   71 yo female who presented with SBO. Now POD18 from Ex lap, TIFFANY and G tube placement.     Post-Op Info:  Procedure(s) (LRB):  EXPLORATORY-LAPAROTOMY (N/A)  REPAIR  LYSIS-ADHESION (N/A)  PLACEMENT   19 Days Post-Op     Interval History:   Patient seen and examined, no acute events overnight, +flatus, no BM, H/H rising; tolerating low fiber diet with no nausea/vomiting, otherwise afebrile/VSS    Medications:  Continuous Infusions:   Scheduled Meds:   enoxaparin  40 mg Subcutaneous Daily    insulin aspart  3 Units Subcutaneous TIDWM    Lactobacillus rhamnosus GG  1 capsule Oral Daily    levothyroxine  88 mcg Oral Before breakfast    magnesium oxide  400 mg Oral Daily    pantoprazole  40 mg Oral Daily    polyethylene glycol  17 g Oral Daily    senna-docusate 8.6-50 mg  1 tablet Oral Daily    sodium chloride 0.9%  10 mL Intravenous Q6H    sodium chloride 0.9%  3 mL Intravenous Q8H     PRN Meds:sodium chloride, sodium chloride, sodium chloride, cyclobenzaprine, dextrose 50%, dextrose 50%, diphenhydrAMINE, diphenhydrAMINE, glucagon (human recombinant), glucose, glucose, hydrocodone-acetaminophen 10-325mg, hydrocodone-acetaminophen 5-325mg, insulin aspart, ondansetron, promethazine (PHENERGAN) IVPB, Flushing PICC Protocol **AND** sodium chloride 0.9% **AND** sodium chloride 0.9%     Review of patient's allergies indicates:   Allergen Reactions    Codeine Nausea And Vomiting     Other reaction(s): Itching    Percocet  [oxycodone-acetaminophen]      Other reaction(s): Itching    Sulfa (sulfonamide antibiotics)      Other reaction(s): Nausea  Other reaction(s): Itching    Adhesive Rash     Objective:     Vital Signs (Most Recent):  Temp: 98 °F (36.7 °C) (04/20/17 0420)  Pulse: 68 (04/20/17 0700)  Resp: 16 (04/20/17 0700)  BP: (!) 111/56 (04/20/17 0700)  SpO2: 96 % (04/20/17 0420) Vital Signs (24h  Range):  Temp:  [96.9 °F (36.1 °C)-98.4 °F (36.9 °C)] 98 °F (36.7 °C)  Pulse:  [67-78] 68  Resp:  [16-18] 16  SpO2:  [95 %-100 %] 96 %  BP: (107-124)/(51-70) 111/56     Weight: 69.9 kg (154 lb)  Body mass index is 26.43 kg/(m^2).    Intake/Output - Last 3 Shifts       04/18 0700 - 04/19 0659 04/19 0700 - 04/20 0659 04/20 0700 - 04/21 0659    P.O. 600 1300     I.V. (mL/kg) 400 (5.7) 327.5 (4.7)     Blood       NG/GT 40 20     Total Intake(mL/kg) 1040 (14.9) 1647.5 (23.6)     Urine (mL/kg/hr) 1250 (0.7) 600 (0.4)     Drains  0 (0)     Stool 0 (0)      Total Output 1250 600      Net -210 +1047.5             Urine Occurrence 5 x 5 x     Stool Occurrence 6 x 0 x           Physical Exam   Constitutional: She is oriented to person, place, and time. She appears well-developed and well-nourished. No distress.   HENT:   Head: Normocephalic and atraumatic.   Cardiovascular: Normal rate and regular rhythm.    Pulmonary/Chest: Effort normal. No respiratory distress.   Abdominal:   Soft, NTND, appropriate TTP, incision - clean, dry and intact; hematoma noted to middle of incision opened and packed this AM  Previous KOURTNEY drain site dressed   Neurological: She is alert and oriented to person, place, and time.   Skin: Skin is warm.   Psychiatric: She has a normal mood and affect.       Significant Labs:  CBC:   Recent Labs  Lab 04/20/17  0420   WBC 5.28   RBC 2.75*   HGB 8.5*   HCT 26.6*      MCV 97   MCH 30.9   MCHC 32.0     BMP:   Recent Labs  Lab 04/20/17  0420   *  133*   *  135*   K 3.6  3.6     106   CO2 22*  22*   BUN 4*  4*   CREATININE 0.8  0.8   CALCIUM 8.0*  8.0*   MG 1.5*  1.5*     CMP:   Recent Labs  Lab 04/20/17  0420   *  133*   CALCIUM 8.0*  8.0*   ALBUMIN 2.2*  2.2*   PROT 6.0  6.0   *  135*   K 3.6  3.6   CO2 22*  22*     106   BUN 4*  4*   CREATININE 0.8  0.8   ALKPHOS 64  64   ALT 23  23   AST 27  27   BILITOT 0.9  0.9     LFTs:   Recent Labs  Lab  "04/20/17  0420   ALT 23  23   AST 27  27   ALKPHOS 64  64   BILITOT 0.9  0.9   PROT 6.0  6.0   ALBUMIN 2.2*  2.2*     Coagulation:   Recent Labs  Lab 04/19/17  0352   INR 1.2     Assessment/Plan:     * SBO (small bowel obstruction)  S/p ex lap, TIFFANY, G tube placement  G tube clamped - will stay in place approx 6 weeks from surgery date  Continue low residue diet   Continue bowel regimen - miralax and senna/colace daily  Nausea/pain meds PRN    Cervical spondylosis with radiculopathy  Home meds    Acid reflux  PO protonix     Hypothyroid  Continue home meds      Type 2 diabetes mellitus without complication, without long-term current use of insulin  SSI. Endo signed off - she will follow up with them as outpatient  Appreciate assistance  BG goal 140-180  novolog 3u TID AC if patient eats more than 50% of her meal    Acute deep vein thrombosis (DVT) of upper extremity  Repeat US shows "No evidence of acute DVT in the right upper extremity."  Will dc therapeutic heparin, restart lovenox    Wound infection after surgery  S/p opening incision. Erythema resolved  Continuing daily packing changes   No abx   Afebrile, no leukocytosis     Hypomagnesemia  Replace    GI bleed  Most likely diverticular bleed   No further bloody bowel movements   H/H trending up  Will continue to trend cbc  Continue low fiber diet      Hypokalemia  Replace    Hyponatremia  Monitor  Continue regular diet    Hematoma  Noted to midline incision  Drained and packed this AM; patient tolerated it well    Prophylaxis  DVT/GI    Dispo  PT/OT recommends home with home health; patient lives alone and does not feel capable of going home alone. Working on SNF placement.  Stable for discharge 4/19/17      Sanjuana Redding PA-C   [id number]  General Surgery  [facility]  "

## 2017-04-20 NOTE — PLAN OF CARE
Problem: Patient Care Overview  Goal: Plan of Care Review  Outcome: Ongoing (interventions implemented as appropriate)  Pt AAOx4. VSS, afebrile. Pain controlled with PRN meds. G tube remained clamped during shift, no c/o nausea. Pt tolerating diet but with little appetite. ML incision dressing C/D/I. Pt ambulating independently. Voiding adequately. No BMs during shift. Pt is free from falls/injury/trauma. Call bell within reach. No acute events overnight, will continue to monitor.

## 2017-04-20 NOTE — DISCHARGE SUMMARY
[facility]  General Surgery  Discharge Summary      Patient Name: Anayeli Judd  MRN: [id number]  Admission Date: 3/30/2017  Hospital Length of Stay: 21 days  Discharge Date and Time:  04/20/2017 1:45 PM  Attending Physician: Herman Fletcher MD   Discharging Provider: Sanjuana Redding PA-C  Primary Care Provider: Rocio Mc MD     HPI: History of Present Illness: This is a 72 year old female with history of colon cancer s/p partial colectomy in the past, and a prior episode of SBO that required resection of small bowel. Who presents with 2 days history of obstipation, constipation and nausea and inability to tolerate po. She states she has abdominal distension and cramping pain that is similar to her prior bowel obstruction. She came to [facility] for further evaluation. A CT scan was obtained that did reveal small bowel obstruction.     Procedure(s) (LRB):  EXPLORATORY-LAPAROTOMY (N/A)  REPAIR  LYSIS-ADHESION (N/A)  PLACEMENT     Hospital Course:   Patient presented to the ED with signs and symptoms of a small bowel obstruction. She was admitted to the surgical service, made NPO, IVF, and serial abdominal exams were performed. After conservative management failed, on hospital day 3 she underwent: Procedure(s) (LRB):  EXPLORATORY-LAPAROTOMY (N/A)  REPAIR  LYSIS-ADHESION (N/A)  PLACEMENT. She tolerated the procedure well and was transferred to the floor after recovery from anesthesia. Please see the operative note for further procedure details. POD 3, she developed tachycardia, emesis, and SONNY. She was started on abx, and given fluids, which resolved her symptoms. She had an extended post-op ileus and required TPN. She developed DVT at PICC insertion site and was placed on heparin. Endocrinology was consulted for uncontrolled diabetes. She was eventually weaned off TPN, PICC was removed. She developed GI bleed, and all anticoagulants were held. Colorectal was consulted and requested a CTA  abdomen if melena persisted. Her melena resolved prior to discharge. She did require a blood transfusion, and responded appropriately. Vitals otherwise remained stable throughout her post operative period. Labs were reviewed and electrolytes were replaced appropriately. Physical exam was appropriate for post operative state.  Diet was slowly advanced, and she was able to tolerate a regular diet prior to discharge. She was ambulating without difficulty and had normal bowel function prior to discharge. Patient was deemed suitable for discharge on 19 Days Post-Op. She was discharged home with medications and instructions as below. She voiced understanding of the instructions prior to discharge.     For more thorough information, please refer to the hospital record and operative report.      Consults:   Consults         Status Ordering Provider     Inpatient consult to Colorectal Surgery  Once     Provider:  (Not yet assigned)    Completed MARISABEL GILLIS JR.     Inpatient consult to Dietary  Once     Provider:  (Not yet assigned)    Completed NEREIDA HOLLEY     Inpatient consult to Endocrinology  Once     Provider:  (Not yet assigned)    Completed ALLAN CLIFTON     Inpatient consult to General Surgery  Once     Provider:  (Not yet assigned)    Completed GAIL DUMONT     Inpatient [facility] team ([facility])  Once     Provider:  (Not yet assigned)    Completed FEDERICO EVANGELISTA     Inpatient consult to PICC team ([facility])  Once     Provider:  (Not yet assigned)    Completed NEREIDA HOLLEY          Significant Diagnostic Studies: Labs:   BMP:   Recent Labs  Lab 04/19/17  0352 04/20/17  0420   * 133*  133*   * 135*  135*   K 3.4* 3.6  3.6    106  106   CO2 23 22*  22*   BUN 7* 4*  4*   CREATININE 0.7 0.8  0.8   CALCIUM 7.6* 8.0*  8.0*   MG 1.8 1.5*  1.5*   , CMP   Recent Labs  Lab 04/19/17  0352 04/20/17  0420   * 135*  135*   K 3.4* 3.6  3.6    106   106   CO2 23 22*  22*   * 133*  133*   BUN 7* 4*  4*   CREATININE 0.7 0.8  0.8   CALCIUM 7.6* 8.0*  8.0*   PROT 5.6* 6.0  6.0   ALBUMIN 2.2* 2.2*  2.2*   BILITOT 0.5 0.9  0.9   ALKPHOS 55 64  64   AST 26 27  27   ALT 17 23  23   ANIONGAP 5* 7*  7*   ESTGFRAFRICA >60.0 >60.0  >60.0   EGFRNONAA >60.0 >60.0  >60.0   , CBC   Recent Labs  Lab 04/19/17  0352 04/20/17  0420   WBC 5.04 5.28   HGB 7.8* 8.5*   HCT 24.2* 26.6*    179   , INR   Lab Results   Component Value Date    INR 1.2 04/19/2017    INR 1.2 04/18/2017    INR 1.2 04/17/2017   , Lipid Panel     Recent Labs  Lab 11/02/16  0823 02/06/17  1505 03/30/17  2304   HGBA1C 6.5* 6.6* 6.6*      All labs within the past 24 hours have been reviewed    Radiology: X-Ray: CXR: X-Ray Chest 1 View (CXR):   Results for orders placed or performed during the hospital encounter of 03/30/17   X-Ray Chest 1 View    Narrative    PORTABLE AP CHEST:      Comparison: None.    Findings:     PICC line terminates over the SVC.Lung volumes are low. Left basilar atelectasis versus small effusion. There is no pleural effusion or pneumothorax. The cardiac silhouette isnormal in size.  There are no acute bony abnormalities.    Impression    PICC line terminates over the SVC.      Electronically signed by: CR KRISHNA MD  Date:     04/05/17  Time:    17:51      KUB: X-Ray Abdomen AP 1 View (KUB):   Results for orders placed or performed during the hospital encounter of 03/30/17   X-Ray Abdomen AP 1 View    Narrative    Abdomen, single view.    Indication:abd pain.    Findings:    Comparison April 2, 2017 at 5:47 AM    Paucity of bowel gas. Stable postop changes compared to earlier study.    Impression             Paucity of bowel gas.      Electronically signed by: DAISY KHAN MD  Date:     04/02/17  Time:    22:02      CT scan: CT ABDOMEN PELVIS WITH CONTRAST:   Results for orders placed or performed during the hospital encounter of 03/30/17   CT Abdomen  Pelvis With Contrast    Narrative    Procedure comments: The patient was surveyed from the lung bases through the pelvis after the administration of 75 cc Omnipaque 350 intravenous contrast.  No oral contrast was administered.  Delay phase was obtained.  Data was reconstructed and coronal, sagittal, and axial images were reviewed.    Comparison: CT abdomen/pelvis 10/30/15..    Findings:    The lung bases are unremarkable.  No significant pleural or pericardial fluid is identified.  There is mild coronary artery atherosclerosis.    The liver is normal in size and attenuation without focal abnormality.  The gallbladder is surgically absent.  There is no intra-or extrahepatic biliary ductal dilatation.    The spleen, pancreas, and adrenal glands are unremarkable.    The kidneys are normal in size and enhancement and excrete contrast on delayed imaging.  There are bilateral tiny hypodense foci within both kidneys which are to small to definitively characterize and likely benign, similar to prior.  There are bilateral extrarenal pelvises, unchanged. The ureters are normal in course and caliber without evidence of calculi. The urinary bladder is unremarkable. The uterus is surgically absent.  No pelvic masses are identified.    The abdominal aorta is normal in course and caliber and demonstrates mild scattered calcific atherosclerosis.  The iliofemoral veins and IVC are unremarkable.    Postoperative changes right partial colectomy and small bowel anastomosis within the lower midabdomen are again identified.  The stomach is filled with fluid.  There are multiple dilated loops of fluid-filled duodenum, jejunum, and proximal ileum measuring up to 3.9 cm in diameter.  At the small bowel anastomosis, there is a large volume of fecal material compatible with slow small bowel transit, and just distal to the small bowel anastomosis there is abrupt tapering of the bowel caliber (coronal series 5 images 18 through 22), compatible  with small bowel obstruction.  No pneumatosis or intraperitoneal free air is identified.  Distal to this point the ileum is normal in caliber.  There is mild sigmoid diverticulosis.  The colon is otherwise unremarkable.  The rectum is unremarkable. The appendix is not identified.  The intraperitoneal free fluid or organized fluid collections are visualized within the abdomen or pelvis. There is no evidence of lymph node enlargement in the abdomen or pelvis.    The osseous structures demonstrate grade 2 anterolisthesis of L5-S1, secondary to bilateral pars defects, unchanged, and there are multilevel degenerative changes of the thoracolumbar spine.  No aggressive osseous lesion or acute displaced fracture identified.    There is diastases of the anterior abdominal wall without definite ventral hernia identified.  There is a small fat containing left inguinal hernia.    The extraperitoneal soft tissues are otherwise unremarkable.    Impression    1.  Small bowel obstruction at the level of the small bowel anastomosis within the lower midabdomen without evidence of pneumatosis, pneumoperitoneum, or ascites.  Findings identified at 2110 and discussed with Dr. Garcia emergency department at 2111.    2.  Stable postoperative findings of right partial colectomy, cholecystectomy, and hysterectomy.    3.  Stable grade 2 spondylolisthesis of L5-S1, secondary to bilateral pars defects.    4.  Additional findings as above.  ______________________________________     Electronically signed by resident: LEANDRO HOFF MD  Date:     03/30/17  Time:    21:24            As the supervising and teaching physician, I personally reviewed the images and resident's interpretation and I agree with the findings.          Electronically signed by: DAISY KHAN MD  Date:     03/30/17  Time:    21:41         Pending Diagnostic Studies:     Procedure Component Value Units Date/Time    CBC auto differential [[id number]] Collected:  04/11/17 0447     Order Status:  Sent Lab Status:  In process Updated:  04/11/17 0448    Specimen:  Blood from Blood     Comprehensive metabolic panel [[id number]] Collected:  04/11/17 0448    Order Status:  Sent Lab Status:  In process Updated:  04/11/17 0448    Specimen:  Blood from Blood     Magnesium [[id number]] Collected:  04/11/17 0448    Order Status:  Sent Lab Status:  In process Updated:  04/11/17 0448    Specimen:  Blood from Blood         Final Active Diagnoses:    Diagnosis Date Noted POA    PRINCIPAL PROBLEM:  SBO (small bowel obstruction) [K56.69] 03/30/2017 Yes    Hematoma [T14.8] 04/20/2017 Unknown    Hyponatremia [E87.1] 04/19/2017 Unknown    Hypokalemia [E87.6] 04/18/2017 Unknown    Hypophosphatemia [E83.39] 04/18/2017 Unknown    GI bleed [K92.2] 04/17/2017 No    Acute deep vein thrombosis (DVT) of upper extremity [I82.629] 04/15/2017 No    Wound infection after surgery [T81.4XXA] 04/15/2017 No    Hypomagnesemia [E83.42] 04/15/2017 No    Type 2 diabetes mellitus without complication, without long-term current use of insulin [E11.9] 05/05/2015 Yes     Chronic    History of colon cancer [Z85.038] 04/05/2013 Yes    Hypothyroid [E03.9] 01/11/2013 Yes    Acid reflux [K21.9] 07/25/2012 Yes    Cervical spondylosis with radiculopathy [M47.22] 07/24/2012 Yes      Problems Resolved During this Admission:    Diagnosis Date Noted Date Resolved POA    Diabetes mellitus due to underlying condition with diabetic nephropathy, without long-term current use of insulin [E08.21] 04/15/2017 04/14/2017 Yes    Sequelae of hyperalimentation [E68] 04/11/2017 04/15/2017 No      Patient Active Problem List   Diagnosis    Lumbar spondylosis    Cervical spondylosis with radiculopathy    Insomnia    Acid reflux    Carpal tunnel syndrome    Headache    Hypothyroid    History of colon cancer    Irritable bowel syndrome    Chronic sinusitis of right maxilla    Diarrhea    Pelvic muscle wasting    GERD  (gastroesophageal reflux disease)    Fatty liver    Small bowel obstruction    Partial small bowel obstruction    Hypotension    Family history of breast cancer    Numbness of face    Gait disturbance    Type 2 diabetes mellitus without complication, without long-term current use of insulin    SBO (small bowel obstruction)    Acute deep vein thrombosis (DVT) of upper extremity    Wound infection after surgery    Hypomagnesemia    GI bleed    Hypokalemia    Hypophosphatemia    Hyponatremia    Hematoma     Discharged Condition: good    Disposition:     Follow Up:  Follow-up Information     Follow up with Herman Fletcher MD In 2 weeks.    Specialties:  General Surgery, Surgery    Why:  Post-op appt/Office to call you w/appt. Appt scheduled on 5/9/17 at 10:15 AM.     Contact information:    [address] [facility] [address]  [phone]          Follow up with Soni Solo.    Specialties:  Nursing Home Agency, SNF Agency    Why:  SNF    Contact information:    [address] [facility] [address]  [phone]          Patient Instructions:   No discharge procedures on file.   Discharge procedures per plan of care note dated 4/20/17.    Medications:  Reconciled Home Medications:   Current Discharge Medication List      START taking these medications    Details   diphenhydrAMINE (BENADRYL) 25 mg capsule Take 1 each (25 mg total) by mouth nightly as needed for Itching, Allergies or Insomnia.  Refills: 0      hydrocodone-acetaminophen 5-325mg (NORCO) 5-325 mg per tablet Take 1 tablet by mouth every 6 (six) hours as needed.  Qty: 41 tablet, Refills: 0      pantoprazole (PROTONIX) 40 MG tablet Take 1 tablet (40 mg total) by mouth once daily.      polyethylene glycol (GLYCOLAX) 17 gram PwPk Take 17 g by mouth once daily.  Refills: 0      senna-docusate 8.6-50 mg (PERICOLACE) 8.6-50 mg per tablet Take 1 tablet by mouth once daily.         CONTINUE these medications which have NOT CHANGED     Details   atorvastatin (LIPITOR) 40 MG tablet take 1 tablet by mouth once daily  Qty: 90 tablet, Refills: 1    Associated Diagnoses: Hyperlipidemia      azelastine (ASTELIN) 137 mcg nasal spray 1 spray (137 mcg total) by Nasal route 2 (two) times daily.  Qty: 30 mL, Refills: 1    Associated Diagnoses: Rhinitis      blood sugar diagnostic Strp Dispense Motionsoft contour strips; test blood sugar once daily  Qty: 100 strip, Refills: 11    Associated Diagnoses: Diabetes mellitus, type 2      blood-glucose meter (CONTOUR METER) kit Please dispense Madie Contour meter and supplies Use as instructed Test Blood sugar once daily  Qty: 1 each, Refills: 0    Associated Diagnoses: Type 2 diabetes mellitus without complication      conjugated estrogens (PREMARIN) vaginal cream Place 0.5 g vaginally twice a week.  Qty: 1 applicator, Refills: 3      fluticasone (FLONASE) 50 mcg/actuation nasal spray 2 sprays by Each Nare route once daily.  Refills: 0      hydrocortisone 2.5 % cream Refills: 0      ipratropium (ATROVENT) 0.06 % nasal spray Refills: 0      lancets (ONE TOUCH ULTRASOFT LANCETS) Misc Test blood sugar once daily  Qty: 200 each, Refills: 11    Associated Diagnoses: Diabetes mellitus, type 2      levothyroxine (SYNTHROID) 88 MCG tablet Take 1 tablet (88 mcg total) by mouth before breakfast. 1 Tablet Oral Every morning  Qty: 90 tablet, Refills: 4    Associated Diagnoses: Hypothyroidism due to acquired atrophy of thyroid      LYRICA 100 mg capsule take 1 capsule by mouth twice a day  Qty: 180 capsule, Refills: 3      magnesium 30 mg Tab Take 500 capsules by mouth. Patient's taking magnesium 500mg QD      meloxicam (MOBIC) 15 MG tablet Take 1 tablet (15 mg total) by mouth daily as needed for Pain. With food for arthritis  Qty: 90 tablet, Refills: 1      metformin (GLUCOPHAGE-XR) 750 MG 24 hr tablet Take 1 tablet (750 mg total) by mouth 2 (two) times daily with meals.  Qty: 180 tablet, Refills: 1      ondansetron (ZOFRAN) 8  MG tablet Take 1 tablet (8 mg total) by mouth every 8 (eight) hours as needed for Nausea.  Qty: 20 tablet, Refills: 0      ospemifene (OSPHENA) 60 mg Tab Take 60 mg by mouth once daily.  Qty: 90 tablet, Refills: 1      tramadol (ULTRAM) 50 mg tablet Take 50 mg by mouth every 12 (twelve) hours.  Refills: 0      vitamin D 1000 units Tab Take 185 mg by mouth once daily. D3         STOP taking these medications       amoxicillin-clavulanate 1,000-62.5 mg (AUGMENTIN XR) 1,000-62.5 mg per tablet Comments:   Reason for Stopping:         ciprofloxacin HCl (CIPRO) 250 MG tablet Comments:   Reason for Stopping:         GINGER ROOT (GINGER EXTRACT ORAL) Comments:   Reason for Stopping:         turmeric root extract 500 mg Cap Comments:   Reason for Stopping:               Sanjuana Redding PA-C  General Surgery  [facility]

## 2017-04-20 NOTE — PLAN OF CARE
[facility]     Department of Hospital Medicine     [address] [place] [address]     [phone] after hours  [phone] fax       NURSING HOME ORDERS    04/20/2017    Admit to Nursing Home:    Skilled Bed                                                 Diagnoses:  Active Hospital Problems    Diagnosis  POA    *SBO (small bowel obstruction) [K56.69]  Yes    Hematoma [T14.8]  Unknown    Hyponatremia [E87.1]  Unknown    Hypokalemia [E87.6]  Unknown    Hypophosphatemia [E83.39]  Unknown    GI bleed [K92.2]  No    Acute deep vein thrombosis (DVT) of upper extremity [I82.629]  No    Wound infection after surgery [T81.4XXA]  No    Hypomagnesemia [E83.42]  No    Type 2 diabetes mellitus without complication, without long-term current use of insulin [E11.9]  Yes     Chronic    History of colon cancer [Z85.038]  Yes    Hypothyroid [E03.9]  Yes    Acid reflux [K21.9]  Yes    Cervical spondylosis with radiculopathy [M47.22]  Yes      Resolved Hospital Problems    Diagnosis Date Resolved POA    Diabetes mellitus due to underlying condition with diabetic nephropathy, without long-term current use of insulin [E08.21] 04/14/2017 Yes    Sequelae of hyperalimentation [E68] 04/15/2017 No       Patient is homebound due to:  SBO (small bowel obstruction)    Allergies:  Review of patient's allergies indicates:   Allergen Reactions    Codeine Nausea And Vomiting     Other reaction(s): Itching    Percocet  [oxycodone-acetaminophen]      Other reaction(s): Itching    Sulfa (sulfonamide antibiotics)      Other reaction(s): Nausea  Other reaction(s): Itching    Adhesive Rash       Vitals:      Every shift (Skilled Nursing patients)    Diet: low residue/low fiber diet   Supplement:  1 can every three times a day with meals                         Type:   Glucerna    Acitivities:     - Up in a chair each morning as tolerated   - Ambulate with assistance to bathroom    - Scheduled walks once each shift (every 8 hours)   - May ambulate independently   - May use walker, or cane    LABS:  Per facility protocol    Nursing Precautions:     - Aspiration precautions:             - Total assistance with meals            -  Upright 90 degrees befor during and after meals             -  Suction at bedside          - Fall precautions per nursing home protocol   - Seizure precaution per jail protocol   - Decubitus precautions:        -  for positioning   - Pressure reducing foam mattress   - Turn patient every two hours. Use wedge pillows to anchor patient    CONSULTS:      Physical Therapy to evaluate and treat     Occupational Therapy to evaluate and treat     Nutrition to evaluate and recommend diet    WOUND CARE:  Wound spray or saline for wound cleaning with all dressing changes.    All wounds to be measured with first dressing changes and every week.    Other Wounds:   Surgical                Location: three wounds to midline abdomen           Apply the following to wound:             -  Wet to Damp dressing: change daily  (frequency)          -  ET Consult    DIABETES CARE:      Check blood sugar:     Fingerstick blood sugar AC and HS   Fingerstick blood sugar every 6 hours if unable to eat      Report CBG < 60 or > 400 to physician.                                          Insulin Sliding Scale          Glucose  Novolog Insulin Subcutaneous        0 - 60   Orange juice or glucose tablet, hold insulin      No insulin   201-250  2 units   251-300  4 units   301-350  6 units   351-400  8 units   >400   10 units then call physician      Medications: Discontinue all previous medication orders, if any. See new list below.     Anayeli Judd   Home Medication Instructions KAYLENE:[id number]    Printed on:04/20/17 1332   Medication Information                      atorvastatin (LIPITOR) 40 MG tablet  take 1 tablet by mouth once daily             azelastine (ASTELIN) 137  mcg nasal spray  1 spray (137 mcg total) by Nasal route 2 (two) times daily.             blood sugar diagnostic Strp  Dispense MySQUAR contour strips; test blood sugar once daily             blood-glucose meter (CONTOUR METER) kit  Please dispense [employer] Contour meter and supplies Use as instructed Test Blood sugar once daily             conjugated estrogens (PREMARIN) vaginal cream  Place 0.5 g vaginally twice a week.                          diphenhydrAMINE (BENADRYL) 25 mg capsule  Take 1 each (25 mg total) by mouth q6 hours as needed for Itching, Allergies or Insomnia.             fluticasone (FLONASE) 50 mcg/actuation nasal spray  2 sprays by Each Nare route once daily.                          hydrocodone-acetaminophen 5-325mg (NORCO) 5-325 mg per tablet  Take 1 tablet by mouth every 6 (six) hours as needed.                                                      lancets (ONE TOUCH ULTRASOFT LANCETS) Misc  Test blood sugar once daily             levothyroxine (SYNTHROID) 88 MCG tablet  Take 1 tablet (88 mcg total) by mouth before breakfast. 1 Tablet Oral Every morning             LYRICA 100 mg capsule  take 1 capsule by mouth twice a day             magnesium 30 mg Tab  Take 500 capsules by mouth. Patient's taking magnesium 500mg QD             meloxicam (MOBIC) 15 MG tablet  Take 1 tablet (15 mg total) by mouth daily as needed for Pain. With food for arthritis             metformin (GLUCOPHAGE-XR) 750 MG 24 hr tablet  Take 1 tablet (750 mg total) by mouth 2 (two) times daily with meals.             ondansetron (ZOFRAN) 8 MG tablet  Take 1 tablet (8 mg total) by mouth every 8 (eight) hours as needed for Nausea.             ospemifene (OSPHENA) 60 mg Tab  Take 60 mg by mouth once daily.             pantoprazole (PROTONIX) 40 MG tablet  Take 1 tablet (40 mg total) by mouth once daily.             polyethylene glycol (GLYCOLAX) 17 gram PwPk  Take 17 g by mouth once daily.             senna-docusate 8.6-50 mg  (PERICOLACE) 8.6-50 mg per tablet  Take 1 tablet by mouth once daily.                                     vitamin D 1000 units Tab  Take 185 mg by mouth once daily. D3                       _________________________________  Sanjuana Redding PA-C  04/20/2017

## 2017-04-20 NOTE — PLAN OF CARE
"Received discharge information from  [facility], stating [facility] Solo F has denied acceptance for the 4th time because she does not meet criteria for SNF (per PT/OT notes);Per EUNICE JOSEPH [facility], as well as [facility], has denies as well for same reason.   Informed COLUMBA Nunez, & reminded her the family chose [facility] as Plan B for disposition. She verbalized her understanding.     CM, along w/EUNICE JOSEPH, visited patient. AAOX4. Daughter, Margareth, at . Informed them of above, & PT/OT recs denote her safe discharge is home w/HH (no equipment recommended for home). They verbalized their understanding. Margareth states,"We will talk to  because they told us she can not go home by herself." Informed Margareth, Senior Resource Guide, w/sitter Agency information, was given to patient & other daughterMarsha, yesterday, for them to arrange for additional help in home should they desire. She verbalized her understanding. GLO will continue to follow.   "

## 2017-04-20 NOTE — ASSESSMENT & PLAN NOTE
Most likely diverticular bleed   No further bloody bowel movements   H/H trending up  Will continue to trend cbc  Continue low fiber diet

## 2017-04-21 PROBLEM — T14.8XXA HEMATOMA: Status: ACTIVE | Noted: 2017-04-21

## 2017-04-21 NOTE — PT/OT/SLP DISCHARGE
Physical Therapy Discharge Summary    Anayeli Judd  MRN: [id number]   SBO (small bowel obstruction)     Patient Discharged from acute Physical Therapy on 2017.  Please refer to prior PT noted date on 2017 for functional status.     Assessment:   Patient appropriate for care in another setting.  GOALS:   Physical Therapy Goals     Not on file      Multidisciplinary Problems (Resolved)        Problem: Physical Therapy Goal    Goal Priority Disciplines Outcome Goal Variances Interventions   Physical Therapy Goal   (Resolved)     PT/OT, PT Outcome(s) achieved     Description:  Goals to be met by: 2017     Patient will increase functional independence with mobility by performin. Supine to sit with Stand-by Assistance- MET  2. Sit to supine with Stand-by Assistance- MET  3. Bed to chair transfer with Modified [place] using Rolling Walker- met  4. Gait  x 150 feet with [place] using no assistive device. -not met  5. Pt sit to stand with or without RW as needed for safety with mod independent- met                Reasons for Discontinuation of Therapy Services  Transfer to alternate level of care.      Plan:  Patient Discharged to: Nursing Home.    Ritika Philip, PT  2017

## 2017-05-09 ENCOUNTER — OFFICE VISIT (OUTPATIENT)
Dept: SURGERY | Facility: CLINIC | Age: 72
End: 2017-05-09
Payer: MEDICARE

## 2017-05-09 VITALS
HEIGHT: 64 IN | TEMPERATURE: 98 F | DIASTOLIC BLOOD PRESSURE: 59 MMHG | HEART RATE: 80 BPM | WEIGHT: 148 LBS | SYSTOLIC BLOOD PRESSURE: 117 MMHG | BODY MASS INDEX: 25.27 KG/M2

## 2017-05-09 DIAGNOSIS — K56.609 SMALL BOWEL OBSTRUCTION: Primary | ICD-10-CM

## 2017-05-09 PROCEDURE — 99024 POSTOP FOLLOW-UP VISIT: CPT | Mod: ,,, | Performed by: SURGERY

## 2017-05-09 PROCEDURE — 99213 OFFICE O/P EST LOW 20 MIN: CPT | Mod: PBBFAC | Performed by: SURGERY

## 2017-05-09 PROCEDURE — 99999 PR PBB SHADOW E&M-EST. PATIENT-LVL III: CPT | Mod: PBBFAC,,, | Performed by: SURGERY

## 2017-05-09 NOTE — PROGRESS NOTES
S/p complex surgery for small bowel obstruction  Patients had a prolonged hospital stay ue to GI bleed from anticoagulation that was started to treat an upper ext DVT that resolved after catheter removal  Anticoagulation was therefore stopped  She since has done well  Is home eating maintaining her weight  Her g tube was removed  She has a pinpoint open area around her umbilicus that was cleaned  And some proud granulation at the upper part of her wound that was cauterized  All in all a good result  WIll see prn

## 2017-05-17 ENCOUNTER — TELEPHONE (OUTPATIENT)
Dept: UROLOGY | Facility: CLINIC | Age: 72
End: 2017-05-17

## 2017-05-17 DIAGNOSIS — R30.0 DYSURIA: Primary | ICD-10-CM

## 2017-05-17 RX ORDER — CEPHALEXIN 500 MG/1
500 CAPSULE ORAL EVERY 8 HOURS
Qty: 21 CAPSULE | Refills: 0 | Status: SHIPPED | OUTPATIENT
Start: 2017-05-17 | End: 2017-05-24

## 2017-05-17 NOTE — TELEPHONE ENCOUNTER
----- Message from Awilda Rodney sent at 5/17/2017  3:07 PM CDT -----  Contact: stephanie from [facility]  _x  1st Request  _  2nd Request  _  3rd Request      Who:stephanie from [facility]    Why: states that pt would like an RX called in for Pain during urination     What Number to Call Back:  [phone]    When to Expect a call back: (Before the end of the day)   -- if call after 3:00 call back will be tomorrow..

## 2017-05-17 NOTE — TELEPHONE ENCOUNTER
"I spoke to Coral with [facility].  Pt is "sure she has a UTI, is having severe burning on urination".  I gave verbal order for urine culture.  HH will obtain this today and send to lab. HH asked if it was okay for pt to take AZO, I told her it was.  Pt is requesting that an antibiotic be called in as well.  I told her I would check with you.  "

## 2017-05-23 ENCOUNTER — TELEPHONE (OUTPATIENT)
Dept: UROLOGY | Facility: CLINIC | Age: 72
End: 2017-05-23

## 2017-05-23 RX ORDER — AMOXICILLIN AND CLAVULANATE POTASSIUM 875; 125 MG/1; MG/1
1 TABLET, FILM COATED ORAL 2 TIMES DAILY
Qty: 14 TABLET | Refills: 0 | Status: SHIPPED | OUTPATIENT
Start: 2017-05-23 | End: 2017-06-13

## 2017-05-23 NOTE — TELEPHONE ENCOUNTER
----- Message from Allisonamelia Valdez sent at 5/23/2017  2:24 PM CDT -----  Contact: stephanie with [facility] [phone]  _  1st Request  _  2nd Request  _  3rd Request        Who: stephanie with [facility] [phone]    Why: faxed urine culture. Did you receive them?    What Number to Call Back:stephanie with [facility] [phone]    When to Expect a call back: (Before the end of the day)   -- if the call is after 12:00, the call back will be tomorrow.

## 2017-05-23 NOTE — TELEPHONE ENCOUNTER
----- Message from Kirti Carmen MA sent at 5/23/2017  2:31 PM CDT -----  Contact: stephanie with [employer] [employer] [phone]  I informed stephanie urine culture result was received.  It is scanned in chart.  Please review  ----- Message -----  From: Allison Valdez  Sent: 5/23/2017   2:24 PM  To: [facility] Staff    _  1st Request  _  2nd Request  _  3rd Request        Who: stephanie with [employer] [phone]    Why: faxed urine culture. Did you receive them?    What Number to Call Back:stephanie with [facility] [employer] [phone]    When to Expect a call back: (Before the end of the day)   -- if the call is after 12:00, the call back will be tomorrow.

## 2017-05-24 DIAGNOSIS — Z85.038 HISTORY OF COLON CANCER: Primary | ICD-10-CM

## 2017-05-24 NOTE — TELEPHONE ENCOUNTER
----- Message from Mariajose Osborn sent at 5/24/2017  3:33 PM CDT -----  Contact: MAXI ROBERTS [[id number]]  _x  1st Request  _  2nd Request  _  3rd Request        Who: MAXI ROBERTS [[id number]]    Why: pt would like clinical staff to explain results from urine culture. Please call, Thanks!    What Number to Call Back: [phone]    When to Expect a call back: (Before the end of the day)   -- if the call is after 12:00, the call back will be tomorrow.

## 2017-05-24 NOTE — TELEPHONE ENCOUNTER
Spoke with pt she is requesting to know the details of her urine culture results and would like to know the reasons being. Pt is requesting a call back and states that if she doesn't answer to please leave her a detailed message. I did inform pt that she most likely won't get a call back on today but tomorrow for sure. Pt was ok with that. Please advise.

## 2017-05-25 ENCOUNTER — TELEPHONE (OUTPATIENT)
Dept: UROLOGY | Facility: CLINIC | Age: 72
End: 2017-05-25

## 2017-05-25 NOTE — TELEPHONE ENCOUNTER
Spoke with patient.  Instructed patient to drink 48 oz. Of water a day.  Patient will call back with any other problems.

## 2017-05-25 NOTE — TELEPHONE ENCOUNTER
----- Message from Allison Valdez sent at 5/25/2017  4:19 PM CDT -----  Contact: pt  _  1st Request  _  2nd Request  _  3rd Request        Who: pt    Why: pt is taking amoxicillin-clavulanate 875-125mg (AUGMENTIN) 875-125 mg per tablet - making urine very dark brown    What Number to Call Back:[phone]    When to Expect a call back: (Before the end of the day)   -- if the call is after 12:00, the call back will be tomorrow.

## 2017-05-25 NOTE — TELEPHONE ENCOUNTER
I sent a prescription in earlier this week. You can let her know the culture showed 3 different bacteria, which usually indicates contamination (like urine brushing against labia on way to the cup) and not necessarily an infection, but since she is having symptoms I chose to go ahead and treat her.

## 2017-06-12 ENCOUNTER — LAB VISIT (OUTPATIENT)
Dept: LAB | Facility: OTHER | Age: 72
End: 2017-06-12
Attending: INTERNAL MEDICINE
Payer: MEDICARE

## 2017-06-12 DIAGNOSIS — R79.9 ABNORMAL BLOOD CHEMISTRY: ICD-10-CM

## 2017-06-12 DIAGNOSIS — R94.6 ABNORMAL THYROID FUNCTION TEST: ICD-10-CM

## 2017-06-12 DIAGNOSIS — R73.09 ELEVATED GLUCOSE LEVEL: ICD-10-CM

## 2017-06-12 DIAGNOSIS — E53.8 B12 DEFICIENCY: ICD-10-CM

## 2017-06-12 DIAGNOSIS — Z85.038 PERSONAL HISTORY OF MALIGNANT NEOPLASM OF LARGE INTESTINE: ICD-10-CM

## 2017-06-12 DIAGNOSIS — K56.609: ICD-10-CM

## 2017-06-12 DIAGNOSIS — N81.84 PELVIC MUSCLE WASTING: ICD-10-CM

## 2017-06-12 LAB
ALBUMIN SERPL BCP-MCNC: 3.7 G/DL
ALP SERPL-CCNC: 70 U/L
ALT SERPL W/O P-5'-P-CCNC: 42 U/L
ANION GAP SERPL CALC-SCNC: 12 MMOL/L
AST SERPL-CCNC: 51 U/L
BASOPHILS # BLD AUTO: 0.02 K/UL
BASOPHILS NFR BLD: 0.3 %
BILIRUB SERPL-MCNC: 0.6 MG/DL
BUN SERPL-MCNC: 17 MG/DL
CALCIUM SERPL-MCNC: 9.3 MG/DL
CHLORIDE SERPL-SCNC: 108 MMOL/L
CHOLEST/HDLC SERPL: 2.7 {RATIO}
CO2 SERPL-SCNC: 20 MMOL/L
CREAT SERPL-MCNC: 0.9 MG/DL
DIFFERENTIAL METHOD: ABNORMAL
EOSINOPHIL # BLD AUTO: 0.1 K/UL
EOSINOPHIL NFR BLD: 2.2 %
ERYTHROCYTE [DISTWIDTH] IN BLOOD BY AUTOMATED COUNT: 14.3 %
EST. GFR  (AFRICAN AMERICAN): >60 ML/MIN/1.73 M^2
EST. GFR  (NON AFRICAN AMERICAN): >60 ML/MIN/1.73 M^2
GLUCOSE SERPL-MCNC: 149 MG/DL
HCT VFR BLD AUTO: 36.2 %
HDL/CHOLESTEROL RATIO: 37 %
HDLC SERPL-MCNC: 100 MG/DL
HDLC SERPL-MCNC: 37 MG/DL
HGB BLD-MCNC: 11.5 G/DL
LDLC SERPL CALC-MCNC: 43 MG/DL
LYMPHOCYTES # BLD AUTO: 1.6 K/UL
LYMPHOCYTES NFR BLD: 25.3 %
MCH RBC QN AUTO: 27.8 PG
MCHC RBC AUTO-ENTMCNC: 31.8 %
MCV RBC AUTO: 87 FL
MONOCYTES # BLD AUTO: 0.7 K/UL
MONOCYTES NFR BLD: 10.4 %
NEUTROPHILS # BLD AUTO: 4 K/UL
NEUTROPHILS NFR BLD: 61.8 %
NONHDLC SERPL-MCNC: 63 MG/DL
PLATELET # BLD AUTO: 123 K/UL
PMV BLD AUTO: 11.8 FL
POTASSIUM SERPL-SCNC: 4.6 MMOL/L
PROT SERPL-MCNC: 7.4 G/DL
RBC # BLD AUTO: 4.14 M/UL
SODIUM SERPL-SCNC: 140 MMOL/L
T4 FREE SERPL-MCNC: 1.52 NG/DL
TRIGL SERPL-MCNC: 100 MG/DL
TSH SERPL DL<=0.005 MIU/L-ACNC: 0.1 UIU/ML
VIT B12 SERPL-MCNC: 307 PG/ML
WBC # BLD AUTO: 6.47 K/UL

## 2017-06-12 PROCEDURE — 84443 ASSAY THYROID STIM HORMONE: CPT

## 2017-06-12 PROCEDURE — 36415 COLL VENOUS BLD VENIPUNCTURE: CPT

## 2017-06-12 PROCEDURE — 82607 VITAMIN B-12: CPT

## 2017-06-12 PROCEDURE — 85025 COMPLETE CBC W/AUTO DIFF WBC: CPT

## 2017-06-12 PROCEDURE — 80053 COMPREHEN METABOLIC PANEL: CPT

## 2017-06-12 PROCEDURE — 83036 HEMOGLOBIN GLYCOSYLATED A1C: CPT

## 2017-06-12 PROCEDURE — 84439 ASSAY OF FREE THYROXINE: CPT

## 2017-06-12 PROCEDURE — 80061 LIPID PANEL: CPT

## 2017-06-13 ENCOUNTER — LAB VISIT (OUTPATIENT)
Dept: LAB | Facility: OTHER | Age: 72
End: 2017-06-13
Attending: INTERNAL MEDICINE
Payer: MEDICARE

## 2017-06-13 DIAGNOSIS — N39.0 URINARY TRACT INFECTION WITHOUT HEMATURIA, SITE UNSPECIFIED: ICD-10-CM

## 2017-06-13 LAB
BACTERIA #/AREA URNS HPF: ABNORMAL /HPF
BILIRUB UR QL STRIP: NEGATIVE
CLARITY UR: ABNORMAL
COLOR UR: YELLOW
ESTIMATED AVG GLUCOSE: 120 MG/DL
GLUCOSE UR QL STRIP: NEGATIVE
HBA1C MFR BLD HPLC: 5.8 %
HGB UR QL STRIP: ABNORMAL
HYALINE CASTS #/AREA URNS LPF: 0 /LPF
KETONES UR QL STRIP: ABNORMAL
LEUKOCYTE ESTERASE UR QL STRIP: ABNORMAL
MICROSCOPIC COMMENT: ABNORMAL
NITRITE UR QL STRIP: NEGATIVE
PH UR STRIP: 6 [PH] (ref 5–8)
PROT UR QL STRIP: ABNORMAL
RBC #/AREA URNS HPF: 3 /HPF (ref 0–4)
SP GR UR STRIP: 1.02 (ref 1–1.03)
SQUAMOUS #/AREA URNS HPF: 3 /HPF
URN SPEC COLLECT METH UR: ABNORMAL
UROBILINOGEN UR STRIP-ACNC: NEGATIVE EU/DL
WBC #/AREA URNS HPF: ABNORMAL /HPF (ref 0–5)

## 2017-06-13 PROCEDURE — 87088 URINE BACTERIA CULTURE: CPT

## 2017-06-13 PROCEDURE — 87186 SC STD MICRODIL/AGAR DIL: CPT

## 2017-06-13 PROCEDURE — 87086 URINE CULTURE/COLONY COUNT: CPT

## 2017-06-13 PROCEDURE — 87077 CULTURE AEROBIC IDENTIFY: CPT

## 2017-06-13 PROCEDURE — 81000 URINALYSIS NONAUTO W/SCOPE: CPT

## 2017-06-16 LAB — BACTERIA UR CULT: NORMAL

## 2017-06-22 ENCOUNTER — TELEPHONE (OUTPATIENT)
Dept: GASTROENTEROLOGY | Facility: CLINIC | Age: 72
End: 2017-06-22

## 2017-06-22 NOTE — TELEPHONE ENCOUNTER
----- Message from Lucille Moran sent at 6/22/2017  8:52 AM CDT -----  Contact: Dr. Rocio Mc  Thank you again for updating me Ms. Peoples! I'm so sorry about the delay in getting back to you yesterday. Dr. Che is out of the office today, but I informed her nurse that Dr. Ohara doesn't have availability until August. They didn't want to schedule anything at this time.    Thanks again,   [facility]   [facility]  [phone]  ----- Message -----  From: Richelle Saenz MA  Sent: 6/21/2017   2:22 PM  To: Dr.Smith Randy's first available appointment is sometime in August.  I do have a waiting list. I could put her on that list but cannot promise to get her in any sooner than August.  He is out of the office a lot this summer.  ----- Message -----  From: Lucille Moran  Sent: 6/21/2017   1:46 PM  To: Lev Lopez    Good Afternoon,     Dr. Mc called today to see if the current patient could be scheduled for the next available appointment  with Dr. Ohara for the Dx of constipation. I believe Dr. Ohraa's schedule is private. Please let me know if you need any other information in order to schedule the appointment and I'd be happy to contact Dr. Mc's office.     Thank you for your assistance!    [facility]   [phone]

## 2017-06-26 ENCOUNTER — TELEPHONE (OUTPATIENT)
Dept: GASTROENTEROLOGY | Facility: CLINIC | Age: 72
End: 2017-06-26

## 2017-06-26 NOTE — TELEPHONE ENCOUNTER
Received message from [employer], urgent referral for small bowel obstruction from Dr. Mc     Attempting to reach pt to let her know we have an opening for tomorrow with Dr. Alvarez.    No answer, message left for pt to please return call to office.    Spoke to Timoteo with Dr. Mc's office ([phone]) e  to let him know of opening.  He states he is going to attempt to reach out to pt.     activity

## 2017-06-27 ENCOUNTER — TELEPHONE (OUTPATIENT)
Dept: ENDOSCOPY | Facility: HOSPITAL | Age: 72
End: 2017-06-27

## 2017-06-27 ENCOUNTER — OFFICE VISIT (OUTPATIENT)
Dept: GASTROENTEROLOGY | Facility: CLINIC | Age: 72
End: 2017-06-27
Payer: MEDICARE

## 2017-06-27 ENCOUNTER — TELEPHONE (OUTPATIENT)
Dept: GASTROENTEROLOGY | Facility: CLINIC | Age: 72
End: 2017-06-27

## 2017-06-27 VITALS
WEIGHT: 146.81 LBS | SYSTOLIC BLOOD PRESSURE: 98 MMHG | HEIGHT: 64 IN | HEART RATE: 71 BPM | BODY MASS INDEX: 25.06 KG/M2 | DIASTOLIC BLOOD PRESSURE: 62 MMHG

## 2017-06-27 DIAGNOSIS — R19.4 CHANGE IN BOWEL HABITS: Primary | ICD-10-CM

## 2017-06-27 DIAGNOSIS — Z12.11 SPECIAL SCREENING FOR MALIGNANT NEOPLASMS, COLON: Primary | ICD-10-CM

## 2017-06-27 DIAGNOSIS — K56.50 SMALL BOWEL OBSTRUCTION DUE TO ADHESIONS: ICD-10-CM

## 2017-06-27 DIAGNOSIS — K52.9 CHRONIC DIARRHEA: ICD-10-CM

## 2017-06-27 DIAGNOSIS — Z85.038 HISTORY OF COLON CANCER, STAGE II: ICD-10-CM

## 2017-06-27 DIAGNOSIS — K52.832 LYMPHOCYTIC COLITIS: ICD-10-CM

## 2017-06-27 PROBLEM — E87.1 HYPONATREMIA: Status: RESOLVED | Noted: 2017-04-19 | Resolved: 2017-06-27

## 2017-06-27 PROBLEM — E83.39 HYPOPHOSPHATEMIA: Status: RESOLVED | Noted: 2017-04-18 | Resolved: 2017-06-27

## 2017-06-27 PROBLEM — E87.6 HYPOKALEMIA: Status: RESOLVED | Noted: 2017-04-18 | Resolved: 2017-06-27

## 2017-06-27 PROBLEM — E83.42 HYPOMAGNESEMIA: Status: RESOLVED | Noted: 2017-04-15 | Resolved: 2017-06-27

## 2017-06-27 PROBLEM — K56.609 SBO (SMALL BOWEL OBSTRUCTION): Status: RESOLVED | Noted: 2017-03-30 | Resolved: 2017-06-27

## 2017-06-27 PROBLEM — K92.2 GI BLEED: Status: RESOLVED | Noted: 2017-04-17 | Resolved: 2017-06-27

## 2017-06-27 PROCEDURE — 1159F MED LIST DOCD IN RCRD: CPT | Mod: ,,, | Performed by: INTERNAL MEDICINE

## 2017-06-27 PROCEDURE — 99213 OFFICE O/P EST LOW 20 MIN: CPT | Mod: PBBFAC | Performed by: INTERNAL MEDICINE

## 2017-06-27 PROCEDURE — 99204 OFFICE O/P NEW MOD 45 MIN: CPT | Mod: S$PBB,,, | Performed by: INTERNAL MEDICINE

## 2017-06-27 PROCEDURE — 99999 PR PBB SHADOW E&M-EST. PATIENT-LVL III: CPT | Mod: PBBFAC,,, | Performed by: INTERNAL MEDICINE

## 2017-06-27 PROCEDURE — 1126F AMNT PAIN NOTED NONE PRSNT: CPT | Mod: ,,, | Performed by: INTERNAL MEDICINE

## 2017-06-27 PROCEDURE — 1157F ADVNC CARE PLAN IN RCRD: CPT | Mod: ,,, | Performed by: INTERNAL MEDICINE

## 2017-06-27 RX ORDER — POLYETHYLENE GLYCOL 3350, SODIUM SULFATE ANHYDROUS, SODIUM BICARBONATE, SODIUM CHLORIDE, POTASSIUM CHLORIDE 236; 22.74; 6.74; 5.86; 2.97 G/4L; G/4L; G/4L; G/4L; G/4L
4 POWDER, FOR SOLUTION ORAL ONCE
Qty: 4000 ML | Refills: 0 | Status: SHIPPED | OUTPATIENT
Start: 2017-06-27 | End: 2017-06-27

## 2017-06-27 RX ORDER — CYANOCOBALAMIN 1000 UG/ML
1000 INJECTION, SOLUTION INTRAMUSCULAR; SUBCUTANEOUS
COMMUNITY
End: 2018-06-20

## 2017-06-27 NOTE — PROGRESS NOTES
[facility] Consultation Note    Reason for Consult:    Chief Complaint   Patient presents with    GI Problem     irregular bowel movements       PCP:   Rocio Mc       Referring MD:  Ozzy Self  No address on file    HPI:  Anayeli Judd is a 72 y.o. female with very complicated medical history, here for evaluation of recurrent small bowel obstructions and also irregular bowel movements.  She has an extensive abdominal surgical history with dense adhesions that have resulted in two episodes of small bowel obstruction, one in September 2014 and one in April 2017.  Each was treated with exploratory laparotomy and lysis of adhesions.  Small bowel injuries occurred during surgery due to the complexity of the adhesions and repairs were done.  Since the last surgery, her bowel movements have been irregular.  Her obstructive symptoms resolved.  Her bowel movements vary between diarrhea (loose, water) to solid, pencil thin stools.  She feels she is more often with loose stools.  No specific foods make her worse.  Denies bloating, cramping, abdominal pain, abdominal distention, nausea, or vomiting.  Mild soreness in the central abdomen following surgery.  She is taking Miralax, 1 cap every morning, and stool softener, one pill every evening.  She is no longer on pain medications.  She was on a liquid probiotic (cannot recall the name), but stopped 2 weeks ago.  Unclear if this helped.        ROS:  Constitutional: No fevers, chills, No weight loss, normal appetite  ENT: No congestion, rhinorrhea, or chronic sinus problems  CV: No chest pain or palpitations  Pulm: No cough, No shortness of breath  Ophtho: No vision changes or pain  GI: see HPI  Derm: No rash or lesions  Heme: No lymphadenopathy, No bruising  MSK: No arthritis or joint swelling  : No dysuria, No frequent urination; reports recurrent urinary tract infections requiring long-term antibiotics (followed by urology -   [facility]).        Medical History:  has a past medical history of Allergic rhinitis; Arthritis; Blood transfusion; Bowel obstruction; Cervical radiculopathy; Colon cancer (); Diarrhea; Family history of breast cancer; Family history of colon cancer; Fatty liver; GERD (gastroesophageal reflux disease); History of shingles; Hyperlipidemia; Hypothyroidism; Irritable bowel syndrome; Microscopic colitis; Raynaud phenomenon; Raynaud's disease; and Type 2 diabetes mellitus.    Surgical History:  has a past surgical history that includes Hysterectomy; Appendectomy; Toe Surgery; Tonsillectomy;  section; posterolateral fusion with autograft bone and [place] mineralized bone matrix (13); Carpal tunnel release; Trigger finger release; Back surgery; Cholecystectomy (); and Colectomy (2011).    Family History: family history includes Alcohol abuse in her brother and brother; Arthritis in her brother and daughter; Asthma in her daughter; Bladder Cancer in her mother; Breast cancer (age of onset: 79) in her sister; Cataracts in her mother; Colon cancer in her father; Colon cancer (age of onset: 70) in her brother; Depression in her daughter and daughter; Glaucoma in her mother; Heart disease in her mother; Heart disease (age of onset: 50) in her father; Hyperlipidemia in her mother; Hypertension in her daughter; Kidney disease in her mother; Parkinsonism in her brother; Stroke (age of onset: 40) in her daughter..     Social History:  reports that she has never smoked. She has never used smokeless tobacco. She reports that she drinks about 3.0 oz of alcohol per week . She reports that she does not use drugs.    Review of patient's allergies indicates:   Allergen Reactions    Codeine Nausea And Vomiting     Other reaction(s): Itching    Percocet  [oxycodone-acetaminophen]      Other reaction(s): Itching    Sulfa (sulfonamide antibiotics)      Other reaction(s): Nausea  Other reaction(s): Itching     Adhesive Rash       Prior to Admission medications    Medication Sig Start Date End Date Taking? Authorizing Provider   atorvastatin (LIPITOR) 40 MG tablet take 1 tablet by mouth once daily 1/11/17  Yes Alex Leal MD   azelastine (ASTELIN) 137 mcg nasal spray 1 spray (137 mcg total) by Nasal route 2 (two) times daily. 3/11/15 7/28/17 Yes Darci Sheppard MD   blood sugar diagnostic Strp Dispense MoveinBlue contour strips; test blood sugar once daily 3/24/15  Yes Emely Saha MD   blood-glucose meter (CONTOUR METER) kit Please dispense Madie Contour meter and supplies Use as instructed Test Blood sugar once daily 8/15/16  Yes Rocio Mc MD   conjugated estrogens (PREMARIN) vaginal cream Place 0.5 g vaginally twice a week. 11/7/16 11/7/17 Yes Rocio Mc MD   cyanocobalamin 1,000 mcg/mL injection 1,000 mcg.   Yes Historical Provider, MD   diphenhydrAMINE (BENADRYL) 25 mg capsule Take 1 each (25 mg total) by mouth nightly as needed for Itching, Allergies or Insomnia. 4/20/17  Yes Sanjuana Redding PA-C   fluticasone (FLONASE) 50 mcg/actuation nasal spray 2 sprays by Each Nare route once daily. 2/18/15  Yes Historical Provider, MD   hydrocodone-homatropine 5-1.5 mg/5 ml (HYCODAN) 5-1.5 mg/5 mL Syrp Take 5 mLs by mouth every 4 (four) hours as needed. 5/15/17  Yes Rocio Mc MD   hydrocortisone 2.5 % cream  12/11/15  Yes Historical Provider, MD   ipratropium (ATROVENT) 0.06 % nasal spray  5/12/16  Yes Historical Provider, MD   lancets (ONE TOUCH ULTRASOFT LANCETS) Misc Test blood sugar once daily 3/24/15  Yes Emely Saha MD   levothyroxine (SYNTHROID) 137 MCG Tab tablet Take 1 tablet (137 mcg total) by mouth before breakfast. Brand only 6/13/17 6/13/18 Yes Rocio Mc MD   LYRICA 100 mg capsule take 1 capsule by mouth twice a day 2/27/17  Yes Rocio Mc MD   magnesium 30 mg Tab Take 500 capsules by mouth. Patient's taking magnesium 500mg QD   Yes Historical  "Provider, MD   meloxicam (MOBIC) 15 MG tablet Take 1 tablet (15 mg total) by mouth daily as needed for Pain. With food for arthritis 11/7/16  Yes Rocio Mc MD   metformin (GLUCOPHAGE-XR) 750 MG 24 hr tablet Take 1 tablet (750 mg total) by mouth 2 (two) times daily with meals. 2/8/17 2/8/18 Yes Rocio Mc MD   ondansetron (ZOFRAN) 8 MG tablet Take 1 tablet (8 mg total) by mouth every 8 (eight) hours as needed for Nausea. 3/29/17  Yes Rocio Mc MD   ospemifene (OSPHENA) 60 mg Tab Take 60 mg by mouth once daily. 5/16/16  Yes Rocio Mc MD   pantoprazole (PROTONIX) 40 MG tablet Take 1 tablet (40 mg total) by mouth once daily. 5/1/17 5/1/18 Yes Rocio Mc MD   polyethylene glycol (GLYCOLAX) 17 gram PwPk Take 17 g by mouth once daily. 4/20/17  Yes Sanjuana Redding PA-C   senna-docusate 8.6-50 mg (PERICOLACE) 8.6-50 mg per tablet Take 1 tablet by mouth once daily. 4/20/17  Yes Sanjuana Redding PA-C   tramadol (ULTRAM) 50 mg tablet Take 50 mg by mouth every 12 (twelve) hours. 2/8/17  Yes Historical Provider, MD   vitamin D 1000 units Tab Take 185 mg by mouth once daily. D3   Yes Historical Provider, MD       Objective Findings:  Vital Signs:  BP 98/62   Pulse 71   Ht 5' 4" (1.626 m)   Wt 66.6 kg (146 lb 13.2 oz)   LMP  (LMP Unknown)   BMI 25.20 kg/m²   Body mass index is 25.2 kg/m².    Physical Exam:  General Appearance:  Well appearing in no acute distress, appears stated age  Head:  Normocephalic, atraumatic  Eyes:  No scleral icterus or pallor, EOMI  ENT:  Neck supple, moist mucosa; OP clear  Lungs:  CTA bilaterally in anterior and posterior fields, no wheezes, no crackles; no dullness  Heart:  Regular rate and rhythm, S1, S2 normal, no murmurs heard  Abdomen:  Soft, non tender, non distended with positive bowel sounds in all four quadrants. No hepatosplenomegaly, ascites, or mass; well healed midline scar without hernia.  Extremities:  No clubbing, cyanosis, " or edema        Labs:  Lab Results   Component Value Date    WBC 6.47 06/12/2017    HGB 11.5 (L) 06/12/2017    HCT 36.2 (L) 06/12/2017    MCV 87 06/12/2017     (L) 06/12/2017     Lab Results   Component Value Date     06/12/2017    K 4.6 06/12/2017     06/12/2017    CO2 20 (L) 06/12/2017     (H) 06/12/2017    BUN 17 06/12/2017    CREATININE 0.9 06/12/2017    CALCIUM 9.3 06/12/2017    PROT 7.4 06/12/2017    ALBUMIN 3.7 06/12/2017    BILITOT 0.6 06/12/2017    ALKPHOS 70 06/12/2017    AST 51 (H) 06/12/2017    ALT 42 06/12/2017           Imaging:    CT abd/pelvis 3/30/17:  Findings:    The lung bases are unremarkable.  No significant pleural or pericardial fluid is identified.  There is mild coronary artery atherosclerosis.    The liver is normal in size and attenuation without focal abnormality.  The gallbladder is surgically absent.  There is no intra-or extrahepatic biliary ductal dilatation.    The spleen, pancreas, and adrenal glands are unremarkable.    The kidneys are normal in size and enhancement and excrete contrast on delayed imaging.  There are bilateral tiny hypodense foci within both kidneys which are to small to definitively characterize and likely benign, similar to prior.  There are bilateral extrarenal pelvises, unchanged. The ureters are normal in course and caliber without evidence of calculi. The urinary bladder is unremarkable. The uterus is surgically absent.  No pelvic masses are identified.    The abdominal aorta is normal in course and caliber and demonstrates mild scattered calcific atherosclerosis.  The iliofemoral veins and IVC are unremarkable.    Postoperative changes right partial colectomy and small bowel anastomosis within the lower midabdomen are again identified.  The stomach is filled with fluid.  There are multiple dilated loops of fluid-filled duodenum, jejunum, and proximal ileum measuring up to 3.9 cm in diameter.  At the small bowel anastomosis, there  is a large volume of fecal material compatible with slow small bowel transit, and just distal to the small bowel anastomosis there is abrupt tapering of the bowel caliber (coronal series 5 images 18 through 22), compatible with small bowel obstruction.  No pneumatosis or intraperitoneal free air is identified.  Distal to this point the ileum is normal in caliber.  There is mild sigmoid diverticulosis.  The colon is otherwise unremarkable.  The rectum is unremarkable. The appendix is not identified.  The intraperitoneal free fluid or organized fluid collections are visualized within the abdomen or pelvis. There is no evidence of lymph node enlargement in the abdomen or pelvis.    The osseous structures demonstrate grade 2 anterolisthesis of L5-S1, secondary to bilateral pars defects, unchanged, and there are multilevel degenerative changes of the thoracolumbar spine.  No aggressive osseous lesion or acute displaced fracture identified.    There is diastases of the anterior abdominal wall without definite ventral hernia identified.  There is a small fat containing left inguinal hernia.    The extraperitoneal soft tissues are otherwise unremarkable.     Impression   1.  Small bowel obstruction at the level of the small bowel anastomosis within the lower midabdomen without evidence of pneumatosis, pneumoperitoneum, or ascites.  Findings identified at 2110 and discussed with Dr. Garcia emergency department at 2111.  2.  Stable postoperative findings of right partial colectomy, cholecystectomy, and hysterectomy.  3.  Stable grade 2 spondylolisthesis of L5-S1, secondary to bilateral pars defects.  4.  Additional findings as above.                 Assessment:  Anayeli Judd is a 72 y.o. female with very complicated medical and surgical history:  1. Recurrent small bowel obstructions secondary to post-surgical adhesions - s/p ex-lap with lysis of adhesions 9/2014 and 4/2017.    2. Changes in bowel habits after surgery  with primary diarrhea, but alternates sometimes with solid, thin stools  3. History of microscopic colitis (lymphocytic)  4. History of colon cancer stage IIA in 2011 s/p resection and chemotherapy - followed by oncology and has had no signs of recurrence, last colonoscopy 5/2013.    Bowel changes could be related to microscopic colitis, SIBO or dysbiosis caused by antibiotic use.  This may even be related to a history of IBS, but I would have expected other symptoms such as pain or discomfort which are not present.  Consider medication side effect.  Consider infectious cause given long-term use of antibiotics.         Recommendations/Plan:  1. Low-residue diet  2. Stool tests for infectious etiology and signs of inflammation  3. Schedule for colonoscopy to biopsy for microscopic colitis.    Follow-up pending the above.      Order summary:  Orders Placed This Encounter    Clostridium difficile EIA    Stool culture    Stool Exam-Ova,Cysts,Parasites    Giardia / Cryptosporidum, EIA    WBC, Stool    Case request GI: COLONOSCOPY         Thank you so much for allowing me to participate in the care of Anayeli Judd    Manjit Alvarez MD

## 2017-06-27 NOTE — TELEPHONE ENCOUNTER
Spoke to Ms. Anayeli, she states she will be here for the 1100 appt with Dr. Alvarez this morning.    Also spoke with patricia with Dr. Mc's office who will be sending over additional records to the [phone] fax.

## 2017-06-28 ENCOUNTER — TELEPHONE (OUTPATIENT)
Dept: GASTROENTEROLOGY | Facility: CLINIC | Age: 72
End: 2017-06-28

## 2017-06-28 DIAGNOSIS — R19.7 DIARRHEA, UNSPECIFIED TYPE: Primary | ICD-10-CM

## 2017-06-28 NOTE — TELEPHONE ENCOUNTER
Returned Timoteo's call/lab, he is calling to inform our office patient's stool orders were canceled due to missing label.    Patient also turned in solid stool, not liquid as instructed.     Please re-order test.

## 2017-06-28 NOTE — TELEPHONE ENCOUNTER
----- Message from Cassie Hutchison sent at 6/28/2017 11:51 AM CDT -----  Contact: [facility] lab [id number]  Avani states there is a issue with pt's specimen. Avani is asking that the nurse give a call.

## 2017-07-11 ENCOUNTER — LAB VISIT (OUTPATIENT)
Dept: LAB | Facility: HOSPITAL | Age: 72
End: 2017-07-11
Attending: INTERNAL MEDICINE
Payer: MEDICARE

## 2017-07-11 DIAGNOSIS — R19.7 DIARRHEA, UNSPECIFIED TYPE: ICD-10-CM

## 2017-07-11 LAB
C DIFF GDH STL QL: NEGATIVE
C DIFF TOX A+B STL QL IA: NEGATIVE

## 2017-07-11 PROCEDURE — 87045 FECES CULTURE AEROBIC BACT: CPT

## 2017-07-11 PROCEDURE — 87427 SHIGA-LIKE TOXIN AG IA: CPT | Mod: 59

## 2017-07-11 PROCEDURE — 89055 LEUKOCYTE ASSESSMENT FECAL: CPT

## 2017-07-11 PROCEDURE — 87449 NOS EACH ORGANISM AG IA: CPT

## 2017-07-11 PROCEDURE — 87046 STOOL CULTR AEROBIC BACT EA: CPT | Mod: 59

## 2017-07-11 PROCEDURE — 87329 GIARDIA AG IA: CPT

## 2017-07-11 PROCEDURE — 87209 SMEAR COMPLEX STAIN: CPT

## 2017-07-12 LAB
CRYPTOSP AG STL QL IA: NEGATIVE
E COLI SXT1 STL QL IA: NEGATIVE
E COLI SXT2 STL QL IA: NEGATIVE
G LAMBLIA AG STL QL IA: NEGATIVE
O+P STL TRI STN: NORMAL
WBC #/AREA STL HPF: NORMAL /[HPF]

## 2017-07-13 ENCOUNTER — LAB VISIT (OUTPATIENT)
Dept: LAB | Facility: OTHER | Age: 72
End: 2017-07-13
Attending: INTERNAL MEDICINE
Payer: MEDICARE

## 2017-07-13 DIAGNOSIS — E03.9 UNSPECIFIED HYPOTHYROIDISM: ICD-10-CM

## 2017-07-13 DIAGNOSIS — K76.0 FATTY METAMORPHOSIS OF LIVER: ICD-10-CM

## 2017-07-13 DIAGNOSIS — E53.8 OTHER B-COMPLEX DEFICIENCIES: ICD-10-CM

## 2017-07-13 LAB
ALBUMIN SERPL BCP-MCNC: 3.9 G/DL
ALP SERPL-CCNC: 69 U/L
ALT SERPL W/O P-5'-P-CCNC: 31 U/L
ANION GAP SERPL CALC-SCNC: 12 MMOL/L
AST SERPL-CCNC: 35 U/L
BASOPHILS # BLD AUTO: 0.03 K/UL
BASOPHILS NFR BLD: 0.5 %
BILIRUB SERPL-MCNC: 0.4 MG/DL
BUN SERPL-MCNC: 21 MG/DL
CALCIUM SERPL-MCNC: 9.6 MG/DL
CHLORIDE SERPL-SCNC: 107 MMOL/L
CO2 SERPL-SCNC: 23 MMOL/L
CREAT SERPL-MCNC: 0.9 MG/DL
DIFFERENTIAL METHOD: ABNORMAL
EOSINOPHIL # BLD AUTO: 0.2 K/UL
EOSINOPHIL NFR BLD: 2.3 %
ERYTHROCYTE [DISTWIDTH] IN BLOOD BY AUTOMATED COUNT: 14.8 %
EST. GFR  (AFRICAN AMERICAN): >60 ML/MIN/1.73 M^2
EST. GFR  (NON AFRICAN AMERICAN): >60 ML/MIN/1.73 M^2
GLUCOSE SERPL-MCNC: 134 MG/DL
HCT VFR BLD AUTO: 35.7 %
HGB BLD-MCNC: 11.1 G/DL
LYMPHOCYTES # BLD AUTO: 1.6 K/UL
LYMPHOCYTES NFR BLD: 24.8 %
MCH RBC QN AUTO: 26.4 PG
MCHC RBC AUTO-ENTMCNC: 31.1 %
MCV RBC AUTO: 85 FL
MONOCYTES # BLD AUTO: 0.6 K/UL
MONOCYTES NFR BLD: 9.9 %
NEUTROPHILS # BLD AUTO: 4 K/UL
NEUTROPHILS NFR BLD: 62.5 %
PLATELET # BLD AUTO: 107 K/UL
PMV BLD AUTO: 12.2 FL
POTASSIUM SERPL-SCNC: 4.3 MMOL/L
PROT SERPL-MCNC: 7.5 G/DL
RBC # BLD AUTO: 4.21 M/UL
SODIUM SERPL-SCNC: 142 MMOL/L
T4 FREE SERPL-MCNC: 1.21 NG/DL
TSH SERPL DL<=0.005 MIU/L-ACNC: 0.1 UIU/ML
WBC # BLD AUTO: 6.46 K/UL

## 2017-07-13 PROCEDURE — 84443 ASSAY THYROID STIM HORMONE: CPT

## 2017-07-13 PROCEDURE — 84439 ASSAY OF FREE THYROXINE: CPT

## 2017-07-13 PROCEDURE — 36415 COLL VENOUS BLD VENIPUNCTURE: CPT

## 2017-07-13 PROCEDURE — 80053 COMPREHEN METABOLIC PANEL: CPT

## 2017-07-13 PROCEDURE — 85025 COMPLETE CBC W/AUTO DIFF WBC: CPT

## 2017-07-14 LAB — BACTERIA STL CULT: NORMAL

## 2017-07-24 ENCOUNTER — LAB VISIT (OUTPATIENT)
Dept: LAB | Facility: OTHER | Age: 72
End: 2017-07-24
Attending: INTERNAL MEDICINE
Payer: MEDICARE

## 2017-07-24 DIAGNOSIS — N39.0 URINARY TRACT INFECTION WITHOUT HEMATURIA, SITE UNSPECIFIED: ICD-10-CM

## 2017-07-24 LAB
BACTERIA #/AREA URNS HPF: ABNORMAL /HPF
BILIRUB UR QL STRIP: NEGATIVE
CLARITY UR: ABNORMAL
COLOR UR: YELLOW
GLUCOSE UR QL STRIP: NEGATIVE
HGB UR QL STRIP: ABNORMAL
HYALINE CASTS #/AREA URNS LPF: 0 /LPF
KETONES UR QL STRIP: NEGATIVE
LEUKOCYTE ESTERASE UR QL STRIP: ABNORMAL
MICROSCOPIC COMMENT: ABNORMAL
NITRITE UR QL STRIP: POSITIVE
PH UR STRIP: 6 [PH] (ref 5–8)
PROT UR QL STRIP: ABNORMAL
RBC #/AREA URNS HPF: 10 /HPF (ref 0–4)
SP GR UR STRIP: 1.02 (ref 1–1.03)
URN SPEC COLLECT METH UR: ABNORMAL
UROBILINOGEN UR STRIP-ACNC: NEGATIVE EU/DL
WBC #/AREA URNS HPF: >100 /HPF (ref 0–5)
WBC CLUMPS URNS QL MICRO: ABNORMAL

## 2017-07-24 PROCEDURE — 81000 URINALYSIS NONAUTO W/SCOPE: CPT

## 2017-07-24 PROCEDURE — 87077 CULTURE AEROBIC IDENTIFY: CPT

## 2017-07-24 PROCEDURE — 87086 URINE CULTURE/COLONY COUNT: CPT

## 2017-07-24 PROCEDURE — 87186 SC STD MICRODIL/AGAR DIL: CPT

## 2017-07-24 PROCEDURE — 87088 URINE BACTERIA CULTURE: CPT

## 2017-07-27 ENCOUNTER — SURGERY (OUTPATIENT)
Age: 72
End: 2017-07-27

## 2017-07-27 ENCOUNTER — ANESTHESIA EVENT (OUTPATIENT)
Dept: ENDOSCOPY | Facility: HOSPITAL | Age: 72
End: 2017-07-27
Payer: MEDICARE

## 2017-07-27 ENCOUNTER — ANESTHESIA (OUTPATIENT)
Dept: ENDOSCOPY | Facility: HOSPITAL | Age: 72
End: 2017-07-27
Payer: MEDICARE

## 2017-07-27 ENCOUNTER — HOSPITAL ENCOUNTER (OUTPATIENT)
Facility: HOSPITAL | Age: 72
Discharge: HOME | End: 2017-07-27
Attending: INTERNAL MEDICINE | Admitting: INTERNAL MEDICINE
Payer: MEDICARE

## 2017-07-27 VITALS
SYSTOLIC BLOOD PRESSURE: 114 MMHG | TEMPERATURE: 99 F | RESPIRATION RATE: 12 BRPM | WEIGHT: 145 LBS | DIASTOLIC BLOOD PRESSURE: 60 MMHG | HEART RATE: 68 BPM | BODY MASS INDEX: 24.75 KG/M2 | OXYGEN SATURATION: 98 % | HEIGHT: 64 IN

## 2017-07-27 VITALS — RESPIRATION RATE: 14 BRPM

## 2017-07-27 DIAGNOSIS — K52.9 CHRONIC DIARRHEA: ICD-10-CM

## 2017-07-27 DIAGNOSIS — R19.7 DIARRHEA, UNSPECIFIED TYPE: Primary | ICD-10-CM

## 2017-07-27 LAB
BACTERIA UR CULT: NORMAL
GLUCOSE SERPL-MCNC: 148 MG/DL (ref 70–110)

## 2017-07-27 PROCEDURE — 45385 COLONOSCOPY W/LESION REMOVAL: CPT | Mod: ,,, | Performed by: INTERNAL MEDICINE

## 2017-07-27 PROCEDURE — 25000003 PHARM REV CODE 250: Performed by: INTERNAL MEDICINE

## 2017-07-27 PROCEDURE — 37000009 HC ANESTHESIA EA ADD 15 MINS: Performed by: INTERNAL MEDICINE

## 2017-07-27 PROCEDURE — 88305 TISSUE EXAM BY PATHOLOGIST: CPT | Mod: 59 | Performed by: PATHOLOGY

## 2017-07-27 PROCEDURE — 45380 COLONOSCOPY AND BIOPSY: CPT | Mod: 59,,, | Performed by: INTERNAL MEDICINE

## 2017-07-27 PROCEDURE — 37000008 HC ANESTHESIA 1ST 15 MINUTES: Performed by: INTERNAL MEDICINE

## 2017-07-27 PROCEDURE — 88305 TISSUE EXAM BY PATHOLOGIST: CPT | Mod: 26,,, | Performed by: PATHOLOGY

## 2017-07-27 PROCEDURE — 45380 COLONOSCOPY AND BIOPSY: CPT | Performed by: INTERNAL MEDICINE

## 2017-07-27 PROCEDURE — 27201012 HC FORCEPS, HOT/COLD, DISP: Performed by: INTERNAL MEDICINE

## 2017-07-27 PROCEDURE — D9220A PRA ANESTHESIA: Mod: ANES,,, | Performed by: ANESTHESIOLOGY

## 2017-07-27 PROCEDURE — 27201089 HC SNARE, DISP (ANY): Performed by: INTERNAL MEDICINE

## 2017-07-27 PROCEDURE — 82962 GLUCOSE BLOOD TEST: CPT | Performed by: INTERNAL MEDICINE

## 2017-07-27 PROCEDURE — 63600175 PHARM REV CODE 636 W HCPCS: Performed by: NURSE ANESTHETIST, CERTIFIED REGISTERED

## 2017-07-27 PROCEDURE — D9220A PRA ANESTHESIA: Mod: CRNA,,, | Performed by: NURSE ANESTHETIST, CERTIFIED REGISTERED

## 2017-07-27 PROCEDURE — 45385 COLONOSCOPY W/LESION REMOVAL: CPT | Performed by: INTERNAL MEDICINE

## 2017-07-27 RX ORDER — SODIUM CHLORIDE 9 MG/ML
INJECTION, SOLUTION INTRAVENOUS CONTINUOUS
Status: DISCONTINUED | OUTPATIENT
Start: 2017-07-27 | End: 2017-07-27 | Stop reason: HOSPADM

## 2017-07-27 RX ORDER — PROPOFOL 10 MG/ML
VIAL (ML) INTRAVENOUS
Status: DISCONTINUED | OUTPATIENT
Start: 2017-07-27 | End: 2017-07-27

## 2017-07-27 RX ORDER — LIDOCAINE HCL/PF 100 MG/5ML
SYRINGE (ML) INTRAVENOUS
Status: DISCONTINUED | OUTPATIENT
Start: 2017-07-27 | End: 2017-07-27

## 2017-07-27 RX ADMIN — PROPOFOL 10 MG: 10 INJECTION, EMULSION INTRAVENOUS at 12:07

## 2017-07-27 RX ADMIN — LIDOCAINE HYDROCHLORIDE 60 MG: 20 INJECTION, SOLUTION INTRAVENOUS at 12:07

## 2017-07-27 RX ADMIN — PROPOFOL 50 MG: 10 INJECTION, EMULSION INTRAVENOUS at 12:07

## 2017-07-27 RX ADMIN — PROPOFOL 20 MG: 10 INJECTION, EMULSION INTRAVENOUS at 12:07

## 2017-07-27 RX ADMIN — PROPOFOL 30 MG: 10 INJECTION, EMULSION INTRAVENOUS at 12:07

## 2017-07-27 RX ADMIN — SODIUM CHLORIDE: 0.9 INJECTION, SOLUTION INTRAVENOUS at 11:07

## 2017-07-27 NOTE — PATIENT INSTRUCTIONS
Discharge Summary/Instructions after an Endoscopic Procedure  Patient Name: Anayeli Judd  Patient MRN: [id number]  Patient YOB: 1945 Thursday, July 27, 2017  Manjit Alvarez MD  RESTRICTIONS ON ACTIVITY:  - DO NOT drive a car, operate machinery, make legal/financial decisions, or   drink alcohol until the day after the procedure.    - The following day: return to full activity including work, except no heavy   lifting, straining or running for 3 days if polyps were removed.  - Diet: Eat and drink normally unless instructed otherwise.  TREATMENT FOR COMMON SIDE EFFECTS:  - Mild abdominal pain, bloating or excessive gas: rest, eat lightly and use   a heating pad.  - Sore Throat - treat with throat lozenges. Gargle with warm salt water.  SYMPTOMS TO WATCH FOR AND REPORT TO YOUR PHYSICIAN:  1. Severe abdominal pain or bloating.  2. Pain in chest.  3. Chills or fever occurring within 24 hours after a procedure.  4. A large amount of rectal bleeding, which would show as bright red,   maroon, or black stools. (A small amount of blood from the rectum is not   serious, especially if hemorrhoids are present.)  5. Because air was used during the procedure, expelling large amounts of air   from your rectum or belching is normal.  6. If a bowel prep was taken, you may not have a bowel movement for 1-3   days.  This is normal.  7. Go directly to the emergency room if you notice any of the following:   Chills and/or fever over 101 F   Persistent vomiting   Severe abdominal pain, other than gas cramps   Severe chest pain   Black, tarry stools   Any bleeding - exceeding one tablespoon  Your doctor recommends these additional instructions:  If any biopsies were performed, my office will call you in 5 to 6 business   days with any results.  You are being discharged to home.   You have a contact number available for emergencies.  The signs and symptoms   of potential delayed complications were discussed with you.  You may  return   to normal activities tomorrow.  Written discharge instructions were   provided to you.   Resume your previous diet.   Continue your present medications.   We are waiting for your pathology results.   Telephone your GI clinic for pathology results in two weeks.   Your physician has recommended a repeat colonoscopy in three years for   surveillance of multiple polyps.  For questions, problems or results please call your physician - Manjit Alvarez MD at Work:  [phone].  [facility], EMERGENCY ROOM PHONE NUMBER: [phone]  IF A COMPLICATION OR EMERGENCY SITUATION ARISES AND YOU ARE UNABLE TO REACH   YOUR PHYSICIAN - GO TO THE EMERGENCY ROOM.  Manjit Alvarez MD  7/27/2017 1:01:50 PM  This report has been verified and signed electronically.

## 2017-07-27 NOTE — H&P (VIEW-ONLY)
[facility] Consultation Note    Reason for Consult:    Chief Complaint   Patient presents with    GI Problem     irregular bowel movements       PCP:   Rocio Mc       Referring MD:  Ozzy Self  No address on file    HPI:  Anayeli Judd is a 72 y.o. female with very complicated medical history, here for evaluation of recurrent small bowel obstructions and also irregular bowel movements.  She has an extensive abdominal surgical history with dense adhesions that have resulted in two episodes of small bowel obstruction, one in September 2014 and one in April 2017.  Each was treated with exploratory laparotomy and lysis of adhesions.  Small bowel injuries occurred during surgery due to the complexity of the adhesions and repairs were done.  Since the last surgery, her bowel movements have been irregular.  Her obstructive symptoms resolved.  Her bowel movements vary between diarrhea (loose, water) to solid, pencil thin stools.  She feels she is more often with loose stools.  No specific foods make her worse.  Denies bloating, cramping, abdominal pain, abdominal distention, nausea, or vomiting.  Mild soreness in the central abdomen following surgery.  She is taking Miralax, 1 cap every morning, and stool softener, one pill every evening.  She is no longer on pain medications.  She was on a liquid probiotic (cannot recall the name), but stopped 2 weeks ago.  Unclear if this helped.        ROS:  Constitutional: No fevers, chills, No weight loss, normal appetite  ENT: No congestion, rhinorrhea, or chronic sinus problems  CV: No chest pain or palpitations  Pulm: No cough, No shortness of breath  Ophtho: No vision changes or pain  GI: see HPI  Derm: No rash or lesions  Heme: No lymphadenopathy, No bruising  MSK: No arthritis or joint swelling  : No dysuria, No frequent urination; reports recurrent urinary tract infections requiring long-term antibiotics (followed by urology -   [facility]).        Medical History:  has a past medical history of Allergic rhinitis; Arthritis; Blood transfusion; Bowel obstruction; Cervical radiculopathy; Colon cancer (); Diarrhea; Family history of breast cancer; Family history of colon cancer; Fatty liver; GERD (gastroesophageal reflux disease); History of shingles; Hyperlipidemia; Hypothyroidism; Irritable bowel syndrome; Microscopic colitis; Raynaud phenomenon; Raynaud's disease; and Type 2 diabetes mellitus.    Surgical History:  has a past surgical history that includes Hysterectomy; Appendectomy; Toe Surgery; Tonsillectomy;  section; posterolateral fusion with autograft bone and [place]oca mineralized bone matrix (13); Carpal tunnel release; Trigger finger release; Back surgery; Cholecystectomy (); and Colectomy (2011).    Family History: family history includes Alcohol abuse in her brother and brother; Arthritis in her brother and daughter; Asthma in her daughter; Bladder Cancer in her mother; Breast cancer (age of onset: 79) in her sister; Cataracts in her mother; Colon cancer in her father; Colon cancer (age of onset: 70) in her brother; Depression in her daughter and daughter; Glaucoma in her mother; Heart disease in her mother; Heart disease (age of onset: 50) in her father; Hyperlipidemia in her mother; Hypertension in her daughter; Kidney disease in her mother; Parkinsonism in her brother; Stroke (age of onset: 40) in her daughter..     Social History:  reports that she has never smoked. She has never used smokeless tobacco. She reports that she drinks about 3.0 oz of alcohol per week . She reports that she does not use drugs.    Review of patient's allergies indicates:   Allergen Reactions    Codeine Nausea And Vomiting     Other reaction(s): Itching    Percocet  [oxycodone-acetaminophen]      Other reaction(s): Itching    Sulfa (sulfonamide antibiotics)      Other reaction(s): Nausea  Other reaction(s): Itching     Adhesive Rash       Prior to Admission medications    Medication Sig Start Date End Date Taking? Authorizing Provider   atorvastatin (LIPITOR) 40 MG tablet take 1 tablet by mouth once daily 1/11/17  Yes Alex Leal MD   azelastine (ASTELIN) 137 mcg nasal spray 1 spray (137 mcg total) by Nasal route 2 (two) times daily. 3/11/15 7/28/17 Yes Darci Sheppard MD   blood sugar diagnostic Strp Dispense [employer] [place] contour strips; test blood sugar once daily 3/24/15  Yes Emely Saha MD   blood-glucose meter (CONTOUR METER) kit Please dispense Madie Contour meter and supplies Use as instructed Test Blood sugar once daily 8/15/16  Yes Rocio Mc MD   conjugated estrogens (PREMARIN) vaginal cream Place 0.5 g vaginally twice a week. 11/7/16 11/7/17 Yes Rocio Mc MD   cyanocobalamin 1,000 mcg/mL injection 1,000 mcg.   Yes Historical Provider, MD   diphenhydrAMINE (BENADRYL) 25 mg capsule Take 1 each (25 mg total) by mouth nightly as needed for Itching, Allergies or Insomnia. 4/20/17  Yes Sanjuana Redding PA-C   fluticasone (FLONASE) 50 mcg/actuation nasal spray 2 sprays by Each Nare route once daily. 2/18/15  Yes Historical Provider, MD   hydrocodone-homatropine 5-1.5 mg/5 ml (HYCODAN) 5-1.5 mg/5 mL Syrp Take 5 mLs by mouth every 4 (four) hours as needed. 5/15/17  Yes Rocio Mc MD   hydrocortisone 2.5 % cream  12/11/15  Yes Historical Provider, MD   ipratropium (ATROVENT) 0.06 % nasal spray  5/12/16  Yes Historical Provider, MD   lancets (ONE TOUCH ULTRASOFT LANCETS) Misc Test blood sugar once daily 3/24/15  Yes Emely Saha MD   levothyroxine (SYNTHROID) 137 MCG Tab tablet Take 1 tablet (137 mcg total) by mouth before breakfast. Brand only 6/13/17 6/13/18 Yes Rocio Mc MD   LYRICA 100 mg capsule take 1 capsule by mouth twice a day 2/27/17  Yes Rocio Mc MD   magnesium 30 mg Tab Take 500 capsules by mouth. Patient's taking magnesium 500mg QD   Yes Historical  "Provider, MD   meloxicam (MOBIC) 15 MG tablet Take 1 tablet (15 mg total) by mouth daily as needed for Pain. With food for arthritis 11/7/16  Yes Rocio Mc MD   metformin (GLUCOPHAGE-XR) 750 MG 24 hr tablet Take 1 tablet (750 mg total) by mouth 2 (two) times daily with meals. 2/8/17 2/8/18 Yes Rocio Mc MD   ondansetron (ZOFRAN) 8 MG tablet Take 1 tablet (8 mg total) by mouth every 8 (eight) hours as needed for Nausea. 3/29/17  Yes Rocio Mc MD   ospemifene (OSPHENA) 60 mg Tab Take 60 mg by mouth once daily. 5/16/16  Yes Rocio Mc MD   pantoprazole (PROTONIX) 40 MG tablet Take 1 tablet (40 mg total) by mouth once daily. 5/1/17 5/1/18 Yes Rocio Mc MD   polyethylene glycol (GLYCOLAX) 17 gram PwPk Take 17 g by mouth once daily. 4/20/17  Yes Sanjuana Redding PA-C   senna-docusate 8.6-50 mg (PERICOLACE) 8.6-50 mg per tablet Take 1 tablet by mouth once daily. 4/20/17  Yes Sanjuana Redding PA-C   tramadol (ULTRAM) 50 mg tablet Take 50 mg by mouth every 12 (twelve) hours. 2/8/17  Yes Historical Provider, MD   vitamin D 1000 units Tab Take 185 mg by mouth once daily. D3   Yes Historical Provider, MD       Objective Findings:  Vital Signs:  BP 98/62   Pulse 71   Ht 5' 4" (1.626 m)   Wt 66.6 kg (146 lb 13.2 oz)   LMP  (LMP Unknown)   BMI 25.20 kg/m²   Body mass index is 25.2 kg/m².    Physical Exam:  General Appearance:  Well appearing in no acute distress, appears stated age  Head:  Normocephalic, atraumatic  Eyes:  No scleral icterus or pallor, EOMI  ENT:  Neck supple, moist mucosa; OP clear  Lungs:  CTA bilaterally in anterior and posterior fields, no wheezes, no crackles; no dullness  Heart:  Regular rate and rhythm, S1, S2 normal, no murmurs heard  Abdomen:  Soft, non tender, non distended with positive bowel sounds in all four quadrants. No hepatosplenomegaly, ascites, or mass; well healed midline scar without hernia.  Extremities:  No clubbing, cyanosis, " or edema        Labs:  Lab Results   Component Value Date    WBC 6.47 06/12/2017    HGB 11.5 (L) 06/12/2017    HCT 36.2 (L) 06/12/2017    MCV 87 06/12/2017     (L) 06/12/2017     Lab Results   Component Value Date     06/12/2017    K 4.6 06/12/2017     06/12/2017    CO2 20 (L) 06/12/2017     (H) 06/12/2017    BUN 17 06/12/2017    CREATININE 0.9 06/12/2017    CALCIUM 9.3 06/12/2017    PROT 7.4 06/12/2017    ALBUMIN 3.7 06/12/2017    BILITOT 0.6 06/12/2017    ALKPHOS 70 06/12/2017    AST 51 (H) 06/12/2017    ALT 42 06/12/2017           Imaging:    CT abd/pelvis 3/30/17:  Findings:    The lung bases are unremarkable.  No significant pleural or pericardial fluid is identified.  There is mild coronary artery atherosclerosis.    The liver is normal in size and attenuation without focal abnormality.  The gallbladder is surgically absent.  There is no intra-or extrahepatic biliary ductal dilatation.    The spleen, pancreas, and adrenal glands are unremarkable.    The kidneys are normal in size and enhancement and excrete contrast on delayed imaging.  There are bilateral tiny hypodense foci within both kidneys which are to small to definitively characterize and likely benign, similar to prior.  There are bilateral extrarenal pelvises, unchanged. The ureters are normal in course and caliber without evidence of calculi. The urinary bladder is unremarkable. The uterus is surgically absent.  No pelvic masses are identified.    The abdominal aorta is normal in course and caliber and demonstrates mild scattered calcific atherosclerosis.  The iliofemoral veins and IVC are unremarkable.    Postoperative changes right partial colectomy and small bowel anastomosis within the lower midabdomen are again identified.  The stomach is filled with fluid.  There are multiple dilated loops of fluid-filled duodenum, jejunum, and proximal ileum measuring up to 3.9 cm in diameter.  At the small bowel anastomosis, there  is a large volume of fecal material compatible with slow small bowel transit, and just distal to the small bowel anastomosis there is abrupt tapering of the bowel caliber (coronal series 5 images 18 through 22), compatible with small bowel obstruction.  No pneumatosis or intraperitoneal free air is identified.  Distal to this point the ileum is normal in caliber.  There is mild sigmoid diverticulosis.  The colon is otherwise unremarkable.  The rectum is unremarkable. The appendix is not identified.  The intraperitoneal free fluid or organized fluid collections are visualized within the abdomen or pelvis. There is no evidence of lymph node enlargement in the abdomen or pelvis.    The osseous structures demonstrate grade 2 anterolisthesis of L5-S1, secondary to bilateral pars defects, unchanged, and there are multilevel degenerative changes of the thoracolumbar spine.  No aggressive osseous lesion or acute displaced fracture identified.    There is diastases of the anterior abdominal wall without definite ventral hernia identified.  There is a small fat containing left inguinal hernia.    The extraperitoneal soft tissues are otherwise unremarkable.     Impression   1.  Small bowel obstruction at the level of the small bowel anastomosis within the lower midabdomen without evidence of pneumatosis, pneumoperitoneum, or ascites.  Findings identified at 2110 and discussed with Dr. Garcia emergency department at 2111.  2.  Stable postoperative findings of right partial colectomy, cholecystectomy, and hysterectomy.  3.  Stable grade 2 spondylolisthesis of L5-S1, secondary to bilateral pars defects.  4.  Additional findings as above.                 Assessment:  Anayeli Judd is a 72 y.o. female with very complicated medical and surgical history:  1. Recurrent small bowel obstructions secondary to post-surgical adhesions - s/p ex-lap with lysis of adhesions 9/2014 and 4/2017.    2. Changes in bowel habits after surgery  with primary diarrhea, but alternates sometimes with solid, thin stools  3. History of microscopic colitis (lymphocytic)  4. History of colon cancer stage IIA in 2011 s/p resection and chemotherapy - followed by oncology and has had no signs of recurrence, last colonoscopy 5/2013.    Bowel changes could be related to microscopic colitis, SIBO or dysbiosis caused by antibiotic use.  This may even be related to a history of IBS, but I would have expected other symptoms such as pain or discomfort which are not present.  Consider medication side effect.  Consider infectious cause given long-term use of antibiotics.         Recommendations/Plan:  1. Low-residue diet  2. Stool tests for infectious etiology and signs of inflammation  3. Schedule for colonoscopy to biopsy for microscopic colitis.    Follow-up pending the above.      Order summary:  Orders Placed This Encounter    Clostridium difficile EIA    Stool culture    Stool Exam-Ova,Cysts,Parasites    Giardia / Cryptosporidum, EIA    WBC, Stool    Case request GI: COLONOSCOPY         Thank you so much for allowing me to participate in the care of Anayeli Judd    Manjit Alvarez MD

## 2017-07-27 NOTE — ANESTHESIA PREPROCEDURE EVALUATION
07/27/2017  Anayeli Judd is a 72 y.o., female.    Anesthesia Evaluation    I have reviewed the Patient Summary Reports.        Review of Systems  Anesthesia Hx:  No problems with previous Anesthesia    Social:  Non-Smoker    Hematology/Oncology:  Hematology Normal   Oncology Normal     EENT/Dental:EENT/Dental Normal   Cardiovascular:  Cardiovascular Normal     Pulmonary:  Pulmonary Normal    Renal/:  Renal/ Normal     Hepatic/GI:   GERD    Musculoskeletal:  Musculoskeletal Normal    Neurological:   Neuromuscular Disease, (Cervical radiculopathy)    Endocrine:   Diabetes, type 2 Hypothyroidism    Dermatological:   Raynaud's Disease   Psych:  Psychiatric Normal           Physical Exam  General:  Well nourished    Airway/Jaw/Neck:  Airway Findings: Mouth Opening: Normal Tongue: Normal  General Airway Assessment: Adult  Mallampati: II  Improves to II with phonation.  TM Distance: Normal, at least 6 cm  Jaw/Neck Findings:  Neck ROM: Normal ROM      Dental:  Dental Findings: In tact   Chest/Lungs:  Chest/Lungs Findings: Clear to auscultation, Normal Respiratory Rate     Heart/Vascular:  Heart Findings: Rate: Normal  Rhythm: Regular Rhythm  Sounds: Normal             Anesthesia Plan  Type of Anesthesia, risks & benefits discussed:  Anesthesia Type:  general  Patient's Preference: General  Intra-op Monitoring Plan:   Intra-op Monitoring Plan Comments:   Post Op Pain Control Plan:   Post Op Pain Control Plan Comments:   Induction:   IV  Beta Blocker:  Patient is not currently on a Beta-Blocker (No further documentation required).       Informed Consent: Patient understands risks and agrees with Anesthesia plan.  Questions answered. Anesthesia consent signed with patient.  ASA Score: 3     Day of Surgery Review of History & Physical: I have interviewed and examined the patient. I have reviewed the patient's  H&P dated:  There are no significant changes.          Ready For Surgery From Anesthesia Perspective.

## 2017-07-27 NOTE — INTERVAL H&P NOTE
Pre-Procedure H and P Addendum    Patient seen and examined.  History and exam unchanged from prior history and physical.      Procedure: Colonoscopy   Indication: Chronic diarrhea, changes in bowel habits, history of microscopic colitis and colon cancer.  ASA Class: per anesthesiology  Airway: normal  Neck Mobility: full range of motion  Mallampatti score: per anesthesia  History of anesthesia problems: no  Family history of anesthesia problems: no  Anesthesia Plan: MAC    Anesthesia/Surgery risks, benefits and alternative options discussed and understood by patient/family.          Active Hospital Problems    Diagnosis  POA    Chronic diarrhea [K52.9]  Yes      Resolved Hospital Problems    Diagnosis Date Resolved POA   No resolved problems to display.

## 2017-07-27 NOTE — TRANSFER OF CARE
"Anesthesia Transfer of Care Note    Patient: Anayeli Judd    Procedure(s) Performed: Procedure(s) (LRB):  COLONOSCOPY (N/A)    Patient location: PACU    Anesthesia Type: general    Transport from OR: Transported from OR on room air with adequate spontaneous ventilation    Post pain: adequate analgesia    Post assessment: no apparent anesthetic complications and tolerated procedure well    Post vital signs: stable    Level of consciousness: awake, alert and oriented    Nausea/Vomiting: no nausea/vomiting    Complications: none    Transfer of care protocol was followed      Last vitals:   Visit Vitals  /63 (Patient Position: Lying, BP Method: Automatic)   Pulse 72   Temp 36.8 °C (98.3 °F) (Oral)   Resp 16   Ht 5' 4" (1.626 m)   Wt 65.8 kg (145 lb)   LMP  (LMP Unknown)   SpO2 (!) 94%   Breastfeeding? No   BMI 24.89 kg/m²     "

## 2017-07-27 NOTE — ANESTHESIA POSTPROCEDURE EVALUATION
"Anesthesia Post Evaluation    Patient: Anayeli Judd    Procedure(s) Performed: Procedure(s) (LRB):  COLONOSCOPY (N/A)    Final Anesthesia Type: general  Patient location during evaluation: GI PACU  Patient participation: Yes- Able to Participate  Level of consciousness: awake and alert  Post-procedure vital signs: reviewed and stable  Pain management: adequate  Airway patency: patent  PONV status at discharge: No PONV  Anesthetic complications: no      Cardiovascular status: blood pressure returned to baseline  Respiratory status: unassisted  Hydration status: euvolemic  Follow-up not needed.        Visit Vitals  /60 (BP Location: Left arm, Patient Position: Lying, BP Method: Automatic)   Pulse 68   Temp 37.1 °C (98.7 °F) (Oral)   Resp 12   Ht 5' 4" (1.626 m)   Wt 65.8 kg (145 lb)   LMP  (LMP Unknown)   SpO2 98%   Breastfeeding? No   BMI 24.89 kg/m²       Pain/Roscoe Score: Pain Assessment Performed: Yes (7/27/2017  1:36 PM)  Presence of Pain: denies (7/27/2017  1:36 PM)  Pain Rating Prior to Med Admin: 0 (7/27/2017  1:36 PM)  Pain Rating Post Med Admin: 0 (7/27/2017  1:36 PM)  Roscoe Score: 10 (7/27/2017  1:36 PM)      "

## 2017-07-28 LAB — POCT GLUCOSE: 148 MG/DL (ref 70–110)

## 2017-07-31 ENCOUNTER — LAB VISIT (OUTPATIENT)
Dept: LAB | Facility: HOSPITAL | Age: 72
End: 2017-07-31
Payer: MEDICARE

## 2017-07-31 ENCOUNTER — OFFICE VISIT (OUTPATIENT)
Dept: HEMATOLOGY/ONCOLOGY | Facility: CLINIC | Age: 72
End: 2017-07-31
Payer: MEDICARE

## 2017-07-31 VITALS
HEIGHT: 64 IN | SYSTOLIC BLOOD PRESSURE: 115 MMHG | DIASTOLIC BLOOD PRESSURE: 53 MMHG | WEIGHT: 148.56 LBS | TEMPERATURE: 98 F | OXYGEN SATURATION: 98 % | RESPIRATION RATE: 19 BRPM | HEART RATE: 63 BPM | BODY MASS INDEX: 25.36 KG/M2

## 2017-07-31 DIAGNOSIS — Z85.038 HISTORY OF COLON CANCER: ICD-10-CM

## 2017-07-31 DIAGNOSIS — R15.9 RECTAL SPHINCTER INCONTINENCE: ICD-10-CM

## 2017-07-31 DIAGNOSIS — Z85.038 HISTORY OF COLON CANCER: Primary | ICD-10-CM

## 2017-07-31 LAB
ALBUMIN SERPL BCP-MCNC: 3.5 G/DL
ALP SERPL-CCNC: 64 U/L
ALT SERPL W/O P-5'-P-CCNC: 30 U/L
ANION GAP SERPL CALC-SCNC: 11 MMOL/L
AST SERPL-CCNC: 33 U/L
BILIRUB SERPL-MCNC: 0.5 MG/DL
BUN SERPL-MCNC: 13 MG/DL
CALCIUM SERPL-MCNC: 9.2 MG/DL
CEA SERPL-MCNC: 2.5 NG/ML
CHLORIDE SERPL-SCNC: 107 MMOL/L
CO2 SERPL-SCNC: 21 MMOL/L
CREAT SERPL-MCNC: 0.9 MG/DL
ERYTHROCYTE [DISTWIDTH] IN BLOOD BY AUTOMATED COUNT: 17.6 %
EST. GFR  (AFRICAN AMERICAN): >60 ML/MIN/1.73 M^2
EST. GFR  (NON AFRICAN AMERICAN): >60 ML/MIN/1.73 M^2
GLUCOSE SERPL-MCNC: 142 MG/DL
HCT VFR BLD AUTO: 34.1 %
HGB BLD-MCNC: 10.7 G/DL
MCH RBC QN AUTO: 26.6 PG
MCHC RBC AUTO-ENTMCNC: 31.4 G/DL
MCV RBC AUTO: 85 FL
NEUTROPHILS # BLD AUTO: 3.1 K/UL
PLATELET # BLD AUTO: 137 K/UL
PMV BLD AUTO: 10.7 FL
POTASSIUM SERPL-SCNC: 4.6 MMOL/L
PROT SERPL-MCNC: 7 G/DL
RBC # BLD AUTO: 4.03 M/UL
SODIUM SERPL-SCNC: 139 MMOL/L
WBC # BLD AUTO: 6.12 K/UL

## 2017-07-31 PROCEDURE — 36415 COLL VENOUS BLD VENIPUNCTURE: CPT

## 2017-07-31 PROCEDURE — 1157F ADVNC CARE PLAN IN RCRD: CPT | Mod: ,,, | Performed by: INTERNAL MEDICINE

## 2017-07-31 PROCEDURE — 1159F MED LIST DOCD IN RCRD: CPT | Mod: ,,, | Performed by: INTERNAL MEDICINE

## 2017-07-31 PROCEDURE — 99213 OFFICE O/P EST LOW 20 MIN: CPT | Mod: S$PBB,,, | Performed by: INTERNAL MEDICINE

## 2017-07-31 PROCEDURE — 99999 PR PBB SHADOW E&M-EST. PATIENT-LVL V: CPT | Mod: PBBFAC,,, | Performed by: INTERNAL MEDICINE

## 2017-07-31 PROCEDURE — 1126F AMNT PAIN NOTED NONE PRSNT: CPT | Mod: ,,, | Performed by: INTERNAL MEDICINE

## 2017-07-31 PROCEDURE — 82378 CARCINOEMBRYONIC ANTIGEN: CPT

## 2017-07-31 PROCEDURE — 80053 COMPREHEN METABOLIC PANEL: CPT

## 2017-07-31 PROCEDURE — 85027 COMPLETE CBC AUTOMATED: CPT

## 2017-07-31 NOTE — Clinical Note
Schedule consult with Colorectal surgery for rectal incontinence (Dr. Erazo or Dr. Stokes. Needs next available/  Schedule CBC,CMP, CEA and see me in 1 year

## 2017-07-31 NOTE — PROGRESS NOTES
"Subjective:       Patient ID: Anayeli Judd is a 72 y.o. female.    Chief Complaint: History of colon cancer  ONCOLOGIC HISTORY:    HPIMs. Judd is a 72-year-old female with a diagnosis of stage IIA transverse colon adenocarcinoma and underwent transverse colectomy on 6/27/11. She completed seven cycles of FOLFOX and then had to be taken off chemotherapy secondary to prolonged hospitalization and diarrhea.    She had a colonoscopy in 5/2013 was normal and a repeat was recommended in 5 years.          HPI She comes in to review labs. She underwent ex lap in April 2017 for bowel obstruction.   She also underwent colonoscopy from 7/17 which revealed "One 5 mm polyp in the transverse colon, removed with a cold snare. Resected and retrieved.Two 2 to 3 mm polyps in the transverse colon, removed with a jumbo cold forceps. Resected and retrieved"        Review of Systems   Constitutional: Negative for appetite change, fatigue and unexpected weight change.   HENT: Negative for mouth sores.    Eyes: Negative for visual disturbance.   Respiratory: Negative for cough and shortness of breath.    Cardiovascular: Negative for chest pain.   Gastrointestinal: Negative for abdominal pain and diarrhea.   Genitourinary: Negative for frequency.   Musculoskeletal: Negative for back pain.   Skin: Negative for rash.   Neurological: Negative for headaches.   Hematological: Negative for adenopathy.   Psychiatric/Behavioral: The patient is not nervous/anxious.    All other systems reviewed and are negative.      Objective:      Physical Exam   Constitutional: She is oriented to person, place, and time. She appears well-developed and well-nourished.   HENT:   Mouth/Throat: No oropharyngeal exudate.   Cardiovascular: Normal rate and normal heart sounds.    Pulmonary/Chest: Effort normal and breath sounds normal. She has no wheezes.   Abdominal: Soft. Bowel sounds are normal. There is no tenderness.   Musculoskeletal: She exhibits no edema " or tenderness.   Lymphadenopathy:     She has no cervical adenopathy.   Neurological: She is alert and oriented to person, place, and time. Coordination normal.   Skin: Skin is warm and dry. No rash noted.   Psychiatric: She has a normal mood and affect. Judgment and thought content normal.   Vitals reviewed.        LABS:  WBC   Date Value Ref Range Status   07/31/2017 6.12 3.90 - 12.70 K/uL Final     Hemoglobin   Date Value Ref Range Status   07/31/2017 10.7 (L) 12.0 - 16.0 g/dL Final     POC Hematocrit   Date Value Ref Range Status   09/03/2014 23 (L) 36 - 54 %PCV Final     Hematocrit   Date Value Ref Range Status   07/31/2017 34.1 (L) 37.0 - 48.5 % Final     Platelets   Date Value Ref Range Status   07/31/2017 137 (L) 150 - 350 K/uL Final     Gran #   Date Value Ref Range Status   07/31/2017 3.1 1.8 - 7.7 K/uL Final     Comment:     The ANC is based on a white cell differential from an   automated cell counter. It has not been microscopically   reviewed for the presence of abnormal cells. Clinical   correlation is required.         Chemistry        Component Value Date/Time     07/31/2017 1159    K 4.6 07/31/2017 1159     07/31/2017 1159    CO2 21 (L) 07/31/2017 1159    BUN 13 07/31/2017 1159    CREATININE 0.9 07/31/2017 1159     (H) 07/31/2017 1159        Component Value Date/Time    CALCIUM 9.2 07/31/2017 1159    ALKPHOS 64 07/31/2017 1159    AST 33 07/31/2017 1159    ALT 30 07/31/2017 1159    BILITOT 0.5 07/31/2017 1159    ESTGFRAFRICA >60.0 07/31/2017 1159    EGFRNONAA >60.0 07/31/2017 1159        CEA: 2.5    Assessment:       1. History of colon cancer    2. Rectal sphincter incontinence        Plan:        1. She is doing well clinically with no evidence of disease recurrence. Will see her in a year   2. Will refer her to CRS.    Above care plan was discussed with patient and all questions were addressed to her satisfaction.             Distress Screening Results: Psychosocial Distress  screening score of Distress Score: 0 noted and reviewed. No intervention indicated.

## 2017-08-03 ENCOUNTER — TELEPHONE (OUTPATIENT)
Dept: ENDOSCOPY | Facility: HOSPITAL | Age: 72
End: 2017-08-03

## 2017-08-09 ENCOUNTER — TELEPHONE (OUTPATIENT)
Dept: GASTROENTEROLOGY | Facility: CLINIC | Age: 72
End: 2017-08-09

## 2017-08-14 ENCOUNTER — OFFICE VISIT (OUTPATIENT)
Dept: SURGERY | Facility: CLINIC | Age: 72
End: 2017-08-14
Payer: MEDICARE

## 2017-08-14 VITALS
SYSTOLIC BLOOD PRESSURE: 110 MMHG | DIASTOLIC BLOOD PRESSURE: 55 MMHG | HEART RATE: 69 BPM | WEIGHT: 148.56 LBS | BODY MASS INDEX: 25.36 KG/M2 | HEIGHT: 64 IN

## 2017-08-14 DIAGNOSIS — R15.9 FULL INCONTINENCE OF FECES: ICD-10-CM

## 2017-08-14 PROCEDURE — 1159F MED LIST DOCD IN RCRD: CPT | Mod: ,,, | Performed by: COLON & RECTAL SURGERY

## 2017-08-14 PROCEDURE — 99213 OFFICE O/P EST LOW 20 MIN: CPT | Mod: PBBFAC | Performed by: COLON & RECTAL SURGERY

## 2017-08-14 PROCEDURE — 1126F AMNT PAIN NOTED NONE PRSNT: CPT | Mod: ,,, | Performed by: COLON & RECTAL SURGERY

## 2017-08-14 PROCEDURE — 99203 OFFICE O/P NEW LOW 30 MIN: CPT | Mod: S$PBB,,, | Performed by: COLON & RECTAL SURGERY

## 2017-08-14 PROCEDURE — 3008F BODY MASS INDEX DOCD: CPT | Mod: ,,, | Performed by: COLON & RECTAL SURGERY

## 2017-08-14 PROCEDURE — 1157F ADVNC CARE PLAN IN RCRD: CPT | Mod: ,,, | Performed by: COLON & RECTAL SURGERY

## 2017-08-14 PROCEDURE — 99999 PR PBB SHADOW E&M-EST. PATIENT-LVL III: CPT | Mod: PBBFAC,,, | Performed by: COLON & RECTAL SURGERY

## 2017-08-14 NOTE — PROGRESS NOTES
CRS Office Visit    SUBJECTIVE:     Chief Complaint: Fecal incontinence    History of Present Illness:  Patient is a 72 y.o. female who had a transverse colon cancer Stage 2A treated with an open transverse colectomy with mobilization or hepatic/splenic flexures as well as the sigmoid colon in  by Dr. Aguirre. She subsequently developed small bowel obstructions in  and, most recently, in 2017 which required exploratory laparotomies with extensive lysis of adhesions. Her surgery in April involved small bowel repair x 14 and placement of a gastrostomy tube. The patient states that she has had worsening fecal incontinence since her last surgery. She is incontinent to both gas and stool. She wears a pad at all times. Her bowel movements are loose and she uses miralax and colace daily because she was told she must never become constipated. She also complains of recurrent UTI and urinary incontinence. She denies fiber supplementation. Her quality of life is significantly impaired due to this issue. She had 2 spontaneous vaginal deliveries. She most recently had a colonoscopy in July with removal of a tubular adenoma in the transverse colon.     Review of patient's allergies indicates:   Allergen Reactions    Codeine Nausea And Vomiting     Other reaction(s): Itching    Percocet  [oxycodone-acetaminophen]      Other reaction(s): Itching    Sulfa (sulfonamide antibiotics)      Other reaction(s): Nausea  Other reaction(s): Itching    Adhesive Rash       Past Medical History:   Diagnosis Date    Allergic rhinitis     Arthritis     Blood transfusion     during delivery and     Bowel obstruction     Cervical radiculopathy     followed by dr cloud    Colon cancer     transverse colon; resected; Stage IIA (pT3 pN0 MX)    Diarrhea     Family history of breast cancer     Family history of colon cancer     Fatty liver     GERD (gastroesophageal reflux disease)     History of shingles      Hyperlipidemia     Hypothyroidism     Irritable bowel syndrome     Microscopic colitis     treated     Raynaud phenomenon     Raynaud's disease     Type 2 diabetes mellitus      Past Surgical History:   Procedure Laterality Date    APPENDECTOMY      BACK SURGERY      CARPAL TUNNEL RELEASE      bilateral      SECTION      CHOLECYSTECTOMY  1965    COLECTOMY  2011    Transverse colon resection by Dr. Aguirre    COLONOSCOPY N/A 2017    Procedure: COLONOSCOPY;  Surgeon: Manjit Alvarez MD;  Location: [facility] ([address] [place] [address]);  Service: Endoscopy;  Laterality: N/A;    HYSTERECTOMY      posterolateral fusion with autograft bone and [place] mineralized bone matrix  13    at [facility] for lumbar spine stenosis    TOE SURGERY      TONSILLECTOMY      TRIGGER FINGER RELEASE       Family History   Problem Relation Age of Onset    Heart disease Father 50     Mi age 50    Colon cancer Father     Bladder Cancer Mother      non smoker    Cataracts Mother     Glaucoma Mother     Heart disease Mother     Hyperlipidemia Mother     Kidney disease Mother     Breast cancer Sister 79    Arthritis Daughter     Asthma Daughter     Depression Daughter     Hypertension Daughter     Stroke Daughter 40    Arthritis Brother     Colon cancer Brother 70    Alcohol abuse Brother     Parkinsonism Brother     Alcohol abuse Brother     Depression Daughter     Celiac disease Neg Hx     Cirrhosis Neg Hx     Colon polyps Neg Hx     Crohn's disease Neg Hx     Cystic fibrosis Neg Hx     Esophageal cancer Neg Hx     Hemochromatosis Neg Hx     Inflammatory bowel disease Neg Hx     Irritable bowel syndrome Neg Hx     Liver cancer Neg Hx     Liver disease Neg Hx     Rectal cancer Neg Hx     Stomach cancer Neg Hx     Ulcerative colitis Neg Hx     Eliazar's disease Neg Hx     Amblyopia Neg Hx     Blindness Neg Hx     Macular degeneration Neg Hx     Retinal detachment Neg Hx     Strabismus Neg  Hx     Melanoma Neg Hx      Social History   Substance Use Topics    Smoking status: Never Smoker    Smokeless tobacco: Never Used    Alcohol use 3.0 oz/week     5 Glasses of wine per week      Comment: socially      Review of Systems:  Constitutional: no fever or chills  Respiratory: no cough or shortness of breath  Cardiovascular: no chest pain or palpitations  Gastrointestinal: tolerating diet, incontinence of stool and gas  Genitourinary: urinary incontinence    OBJECTIVE:     Vital Signs (Most Recent)  Pulse: 69 (08/14/17 0941)  BP: (!) 110/55 (08/14/17 0941)    Physical Exam:  General: White female in NAD sitting in chair in clinic  Neuro: aaox4 maex4 perrl  Respiratory: resps even unlabored  Abdomen: soft, NTND  Extremities: Warm dry and intact  Anorectal Exam:    Anal Skin: Normal    Digital Rectal Exam:  Resting Tone low  Squeeze low  Relaxation with bear down present  Mass none  Rectocele  absent  Tenderness  absent      ASSESSMENT/PLAN:     Anayeli was seen today for consult.    Diagnoses and all orders for this visit:    Full incontinence of feces    Had discussion with patient about reducing miralax and colace to help thicken her bowel movements. Also supplement with fiber. She has low resting tone and low squeeze on ARMAND, would recommend referral to Dr. Erazo to evaluate for sacral nerve stimulator.       I have personally taken the history and examined this patient and agree with the resident's note as stated above.    Luigi Harman

## 2017-08-21 ENCOUNTER — OFFICE VISIT (OUTPATIENT)
Dept: UROLOGY | Facility: CLINIC | Age: 72
End: 2017-08-21
Attending: UROLOGY
Payer: MEDICARE

## 2017-08-21 VITALS
WEIGHT: 148.56 LBS | HEART RATE: 68 BPM | DIASTOLIC BLOOD PRESSURE: 70 MMHG | SYSTOLIC BLOOD PRESSURE: 107 MMHG | HEIGHT: 64 IN | BODY MASS INDEX: 25.36 KG/M2

## 2017-08-21 DIAGNOSIS — N30.00 ACUTE CYSTITIS WITHOUT HEMATURIA: ICD-10-CM

## 2017-08-21 DIAGNOSIS — R15.9 FULL INCONTINENCE OF FECES: ICD-10-CM

## 2017-08-21 DIAGNOSIS — N39.0 RECURRENT UTI: Primary | ICD-10-CM

## 2017-08-21 PROCEDURE — 87086 URINE CULTURE/COLONY COUNT: CPT

## 2017-08-21 PROCEDURE — 87186 SC STD MICRODIL/AGAR DIL: CPT

## 2017-08-21 PROCEDURE — 1159F MED LIST DOCD IN RCRD: CPT | Mod: S$GLB,,, | Performed by: UROLOGY

## 2017-08-21 PROCEDURE — 99214 OFFICE O/P EST MOD 30 MIN: CPT | Mod: 25,S$GLB,, | Performed by: UROLOGY

## 2017-08-21 PROCEDURE — 87088 URINE BACTERIA CULTURE: CPT

## 2017-08-21 PROCEDURE — 1125F AMNT PAIN NOTED PAIN PRSNT: CPT | Mod: S$GLB,,, | Performed by: UROLOGY

## 2017-08-21 PROCEDURE — 87077 CULTURE AEROBIC IDENTIFY: CPT

## 2017-08-21 PROCEDURE — 81002 URINALYSIS NONAUTO W/O SCOPE: CPT | Mod: S$GLB,,, | Performed by: UROLOGY

## 2017-08-21 PROCEDURE — 1157F ADVNC CARE PLAN IN RCRD: CPT | Mod: S$GLB,,, | Performed by: UROLOGY

## 2017-08-21 PROCEDURE — 96372 THER/PROPH/DIAG INJ SC/IM: CPT | Mod: S$GLB,,, | Performed by: UROLOGY

## 2017-08-21 RX ORDER — CEFTRIAXONE 1 G/1
1 INJECTION, POWDER, FOR SOLUTION INTRAMUSCULAR; INTRAVENOUS
Status: COMPLETED | OUTPATIENT
Start: 2017-08-21 | End: 2017-08-21

## 2017-08-21 RX ORDER — AMOXICILLIN AND CLAVULANATE POTASSIUM 875; 125 MG/1; MG/1
1 TABLET, FILM COATED ORAL 2 TIMES DAILY
Qty: 10 TABLET | Refills: 0 | Status: SHIPPED | OUTPATIENT
Start: 2017-08-21 | End: 2017-08-26 | Stop reason: ALTCHOICE

## 2017-08-21 RX ORDER — CIPROFLOXACIN 250 MG/1
250 TABLET, FILM COATED ORAL DAILY
Qty: 30 TABLET | Refills: 5 | Status: SHIPPED | OUTPATIENT
Start: 2017-08-21 | End: 2017-08-26 | Stop reason: ALTCHOICE

## 2017-08-21 RX ADMIN — CEFTRIAXONE 1 G: 1 INJECTION, POWDER, FOR SOLUTION INTRAMUSCULAR; INTRAVENOUS at 11:08

## 2017-08-21 NOTE — PROGRESS NOTES
"Subjective:      Anayeli Judd is a 72 y.o. female who returns today regarding her recurrent UTIs.    She did well earlier this year with 6 months of cipro - no UTIs during this time. She stopped in May and has had 2 culture proven UTIs since and feels that she has one now.    Of note, she had extensive bowel surgery earlier this summer with prolonged hospitalization. Subsequent to this she now has bowel incontinence, usually liquid, requiring pad use. Pads are regularly soiled with stool. She is [place] [facility] [place] and has upcoming evaluation for possible interstim.    The following portions of the patient's history were reviewed and updated as appropriate: allergies, current medications, past family history, past medical history, past social history, past surgical history and problem list.    Review of Systems  A comprehensive multipoint review of systems was negative except as otherwise stated in the HPI.     Objective:   Vitals:   /70 (BP Location: Right arm, Patient Position: Sitting, BP Method: Large (Automatic))   Pulse 68   Ht 5' 4.02" (1.626 m)   Wt 67.4 kg (148 lb 9.4 oz)   LMP  (LMP Unknown)   BMI 25.49 kg/m²     Physical Exam   General: alert and oriented, no acute distress  Respiratory: Symmetric expansion, non-labored breathing  Neuro: no gross deficits  Psych: normal judgment and insight, normal mood/affect and non-anxious    Lab Review   Urinalysis demonstrates positive for WBCs and RBCs  Lab Results   Component Value Date    WBC 6.12 07/31/2017    HGB 10.7 (L) 07/31/2017    HCT 34.1 (L) 07/31/2017    HCT 23 (L) 09/03/2014    MCV 85 07/31/2017     (L) 07/31/2017     Lab Results   Component Value Date    CREATININE 0.9 07/31/2017    BUN 13 07/31/2017     Imaging   None today    Assessment and Plan:   1. Recurrent UTI  2. Acute cystitis  3. Fecal incontinence of feces  - Restart Cipro 250mg daily  - Hopefully these will improve once bowel incontinence is " addressed - I answered her questions regarding interstim and encouraged her to given it consideration  - UA/UCx today and prn  - Augmentin and Rocephin today for acute UTI  - FU 6 months

## 2017-08-22 LAB
BILIRUB SERPL-MCNC: ABNORMAL MG/DL
BLOOD URINE, POC: ABNORMAL
COLOR, POC UA: YELLOW
GLUCOSE UR QL STRIP: ABNORMAL
KETONES UR QL STRIP: ABNORMAL
LEUKOCYTE ESTERASE URINE, POC: ABNORMAL
NITRITE, POC UA: ABNORMAL
PH, POC UA: 5
PROTEIN, POC: ABNORMAL
SPECIFIC GRAVITY, POC UA: 1
UROBILINOGEN, POC UA: ABNORMAL

## 2017-08-23 LAB — BACTERIA UR CULT: NORMAL

## 2017-08-26 ENCOUNTER — HOSPITAL ENCOUNTER (EMERGENCY)
Facility: HOSPITAL | Age: 72
Discharge: HOME | End: 2017-08-26
Attending: EMERGENCY MEDICINE | Admitting: EMERGENCY MEDICINE
Payer: MEDICARE

## 2017-08-26 VITALS
WEIGHT: 105 LBS | SYSTOLIC BLOOD PRESSURE: 123 MMHG | HEIGHT: 64 IN | OXYGEN SATURATION: 99 % | BODY MASS INDEX: 17.93 KG/M2 | RESPIRATION RATE: 18 BRPM | HEART RATE: 70 BPM | DIASTOLIC BLOOD PRESSURE: 60 MMHG | TEMPERATURE: 98 F

## 2017-08-26 DIAGNOSIS — Z87.19 HISTORY OF MELENA: Primary | ICD-10-CM

## 2017-08-26 LAB
ABO + RH BLD: NORMAL
ALBUMIN SERPL BCP-MCNC: 3.2 G/DL
ALP SERPL-CCNC: 57 U/L
ALT SERPL W/O P-5'-P-CCNC: 16 U/L
ANION GAP SERPL CALC-SCNC: 10 MMOL/L
AST SERPL-CCNC: 26 U/L
BASOPHILS # BLD AUTO: 0.02 K/UL
BASOPHILS NFR BLD: 0.4 %
BILIRUB SERPL-MCNC: 0.4 MG/DL
BLD GP AB SCN CELLS X3 SERPL QL: NORMAL
BUN SERPL-MCNC: 16 MG/DL
CALCIUM SERPL-MCNC: 8.8 MG/DL
CHLORIDE SERPL-SCNC: 108 MMOL/L
CO2 SERPL-SCNC: 22 MMOL/L
CREAT SERPL-MCNC: 0.8 MG/DL
DIFFERENTIAL METHOD: ABNORMAL
EOSINOPHIL # BLD AUTO: 0.2 K/UL
EOSINOPHIL NFR BLD: 3.9 %
ERYTHROCYTE [DISTWIDTH] IN BLOOD BY AUTOMATED COUNT: 16.9 %
EST. GFR  (AFRICAN AMERICAN): >60 ML/MIN/1.73 M^2
EST. GFR  (NON AFRICAN AMERICAN): >60 ML/MIN/1.73 M^2
GLUCOSE SERPL-MCNC: 125 MG/DL
HCT VFR BLD AUTO: 34.5 %
HGB BLD-MCNC: 11.2 G/DL
INR PPP: 1
LYMPHOCYTES # BLD AUTO: 1.8 K/UL
LYMPHOCYTES NFR BLD: 34 %
MCH RBC QN AUTO: 27.6 PG
MCHC RBC AUTO-ENTMCNC: 32.5 G/DL
MCV RBC AUTO: 85 FL
MONOCYTES # BLD AUTO: 0.6 K/UL
MONOCYTES NFR BLD: 10.8 %
NEUTROPHILS # BLD AUTO: 2.7 K/UL
NEUTROPHILS NFR BLD: 50.7 %
PLATELET # BLD AUTO: 112 K/UL
PMV BLD AUTO: 10.5 FL
POTASSIUM SERPL-SCNC: 4.2 MMOL/L
PROT SERPL-MCNC: 6.8 G/DL
PROTHROMBIN TIME: 10.9 SEC
RBC # BLD AUTO: 4.06 M/UL
SODIUM SERPL-SCNC: 140 MMOL/L
WBC # BLD AUTO: 5.39 K/UL

## 2017-08-26 PROCEDURE — 99282 EMERGENCY DEPT VISIT SF MDM: CPT | Mod: ,,, | Performed by: EMERGENCY MEDICINE

## 2017-08-26 PROCEDURE — 96360 HYDRATION IV INFUSION INIT: CPT

## 2017-08-26 PROCEDURE — 85025 COMPLETE CBC W/AUTO DIFF WBC: CPT

## 2017-08-26 PROCEDURE — 85610 PROTHROMBIN TIME: CPT

## 2017-08-26 PROCEDURE — 86900 BLOOD TYPING SEROLOGIC ABO: CPT

## 2017-08-26 PROCEDURE — 25500020 PHARM REV CODE 255: Performed by: EMERGENCY MEDICINE

## 2017-08-26 PROCEDURE — 99284 EMERGENCY DEPT VISIT MOD MDM: CPT | Mod: 25

## 2017-08-26 PROCEDURE — 86850 RBC ANTIBODY SCREEN: CPT

## 2017-08-26 PROCEDURE — 96361 HYDRATE IV INFUSION ADD-ON: CPT

## 2017-08-26 PROCEDURE — 93010 ELECTROCARDIOGRAM REPORT: CPT | Mod: ,,, | Performed by: INTERNAL MEDICINE

## 2017-08-26 PROCEDURE — 80053 COMPREHEN METABOLIC PANEL: CPT

## 2017-08-26 PROCEDURE — 93005 ELECTROCARDIOGRAM TRACING: CPT

## 2017-08-26 PROCEDURE — 25000003 PHARM REV CODE 250: Performed by: EMERGENCY MEDICINE

## 2017-08-26 RX ADMIN — IOHEXOL 75 ML: 350 INJECTION, SOLUTION INTRAVENOUS at 02:08

## 2017-08-26 RX ADMIN — SODIUM CHLORIDE 1000 ML: 0.9 INJECTION, SOLUTION INTRAVENOUS at 11:08

## 2017-08-26 NOTE — ED PROVIDER NOTES
Encounter Date: 2017       History     Chief Complaint   Patient presents with    Melena     having black diarrhea  x 3  days.  Hx of intestinal obstruction. Dc'd in July after admit for same.      Patient is a 72-year-old female with complex past medical history presenting with melena.  Patient notes that she has had diarrhea chronically secondary to numerous abdominal/rectal surgeries but in the course of the last week the diarrhea has worsened.  The diarrhea turned black and tarry yesterday.  Patientis she has had symptoms like this in the past requiring transfusion as recently as this March.  Patient denies any shortness of breath, chest pain, or lightheadedness.  Patient denies nausea, fever or chills.  Patient notes pale skin but no easy bruising or other sources of bleeding.          Review of patient's allergies indicates:   Allergen Reactions    Codeine Nausea And Vomiting     Other reaction(s): Itching    Percocet  [oxycodone-acetaminophen]      Other reaction(s): Itching    Sulfa (sulfonamide antibiotics)      Other reaction(s): Nausea  Other reaction(s): Itching    Adhesive Rash     Past Medical History:   Diagnosis Date    Allergic rhinitis     Arthritis     Blood transfusion     during delivery and     Bowel obstruction     Cervical radiculopathy     followed by dr cloud    Colon cancer     transverse colon; resected; Stage IIA (pT3 pN0 MX)    Diarrhea     Family history of breast cancer     Family history of colon cancer     Fatty liver     GERD (gastroesophageal reflux disease)     History of shingles     Hyperlipidemia     Hypothyroidism     Irritable bowel syndrome     Microscopic colitis     treated     Raynaud phenomenon     Raynaud's disease     Type 2 diabetes mellitus      Past Surgical History:   Procedure Laterality Date    APPENDECTOMY      BACK SURGERY      CARPAL TUNNEL RELEASE      bilateral      SECTION      CHOLECYSTECTOMY   1965    COLECTOMY  06/27/2011    Transverse colon resection by Dr. Aguirre    COLONOSCOPY N/A 7/27/2017    Procedure: COLONOSCOPY;  Surgeon: Manjit Alvarez MD;  Location: [facility] ([address] [place] [address]);  Service: Endoscopy;  Laterality: N/A;    HYSTERECTOMY      posterolateral fusion with autograft bone and [place] mineralized bone matrix  2/1/13    at [facility] for lumbar spine stenosis    TOE SURGERY      TONSILLECTOMY      TRIGGER FINGER RELEASE       Family History   Problem Relation Age of Onset    Heart disease Father 50     Mi age 50    Colon cancer Father     Bladder Cancer Mother      non smoker    Cataracts Mother     Glaucoma Mother     Heart disease Mother     Hyperlipidemia Mother     Kidney disease Mother     Breast cancer Sister 79    Arthritis Daughter     Asthma Daughter     Depression Daughter     Hypertension Daughter     Stroke Daughter 40    Arthritis Brother     Colon cancer Brother 70    Alcohol abuse Brother     Parkinsonism Brother     Alcohol abuse Brother     Depression Daughter     Celiac disease Neg Hx     Cirrhosis Neg Hx     Colon polyps Neg Hx     Crohn's disease Neg Hx     Cystic fibrosis Neg Hx     Esophageal cancer Neg Hx     Hemochromatosis Neg Hx     Inflammatory bowel disease Neg Hx     Irritable bowel syndrome Neg Hx     Liver cancer Neg Hx     Liver disease Neg Hx     Rectal cancer Neg Hx     Stomach cancer Neg Hx     Ulcerative colitis Neg Hx     Eliazar's disease Neg Hx     Amblyopia Neg Hx     Blindness Neg Hx     Macular degeneration Neg Hx     Retinal detachment Neg Hx     Strabismus Neg Hx     Melanoma Neg Hx      Social History   Substance Use Topics    Smoking status: Never Smoker    Smokeless tobacco: Never Used    Alcohol use 3.0 oz/week     5 Glasses of wine per week      Comment: socially     Review of Systems   Constitutional: Negative for fever.   HENT: Negative for sore throat.    Respiratory: Negative for shortness of breath.     Cardiovascular: Negative for chest pain.   Gastrointestinal: Positive for anal bleeding, blood in stool and diarrhea. Negative for abdominal distention, abdominal pain, constipation, nausea, rectal pain and vomiting.   Genitourinary: Negative for dysuria and hematuria.   Musculoskeletal: Negative for back pain.   Skin: Negative for rash.   Neurological: Negative for weakness.   Hematological: Does not bruise/bleed easily.   All other systems reviewed and are negative.      Physical Exam     Initial Vitals [08/26/17 1009]   BP Pulse Resp Temp SpO2   128/62 -- (!) 165 97.9 °F (36.6 °C) 98 %      MAP       84         Physical Exam    Vitals reviewed.  Constitutional: She appears well-developed and well-nourished. She is not diaphoretic. No distress.   HENT:   Head: Normocephalic and atraumatic.   Eyes: Conjunctivae and EOM are normal. Pupils are equal, round, and reactive to light.   Neck: Normal range of motion.   Cardiovascular: Normal rate, regular rhythm, normal heart sounds and intact distal pulses. Exam reveals no gallop and no friction rub.    No murmur heard.  Pulmonary/Chest: Breath sounds normal. No respiratory distress. She has no wheezes. She has no rhonchi. She has no rales.   Abdominal: Soft. She exhibits no distension. There is no tenderness. There is no rebound and no guarding.   Genitourinary: Rectal exam shows no external hemorrhoid, no internal hemorrhoid, no fissure, no mass, no tenderness, anal tone normal and guaiac negative stool (no stool in the rectal vault). Guaiac negative stool (no stool in the rectal vault).   Musculoskeletal: She exhibits no edema or tenderness.   Neurological: She is alert.   Skin: Skin is warm and dry. There is pallor.   Psychiatric: She has a normal mood and affect.         ED Course   Procedures  Labs Reviewed   CBC W/ AUTO DIFFERENTIAL - Abnormal; Notable for the following:        Result Value    Hemoglobin 11.2 (*)     Hematocrit 34.5 (*)     RDW 16.9 (*)      Platelets 112 (*)     All other components within normal limits   COMPREHENSIVE METABOLIC PANEL - Abnormal; Notable for the following:     CO2 22 (*)     Glucose 125 (*)     Albumin 3.2 (*)     All other components within normal limits   PROTIME-INR   TYPE & SCREEN     EKG Readings: (Independently Interpreted)   Initial Reading: No STEMI. Previous EKG: Compared with most recent EKG Rhythm: Normal Sinus Rhythm. Heart Rate: 66. ST Segments: Normal ST Segments.                APC / Resident Notes:   HO4 MDM  Pt is 72 y.o. female that presents with melena. DDx includes but is not limited to hemorrhagic anemia, lower GI bleed, upper GI bleed, small bowel obstruction, coagulopathy. Will get labs, urine, CT abdomen while fluid resuscitating and monitor.   Rodolfo Stein  [facility] Emergency Medicine Resident  11:18 AM 08/26/2017     Re-evaluation  Patient's workup with negative labs and CT with no acute pathology.  Patient stable since presentation with no melena in the department.  Patient did have a dark stool that I observed in the commode that was not consistent with melena.  Patient educated about findings and agrees with plan to continue workup as an outpatient.  Rodolfo Stein  [facility] Emergency Medicine Resident  3:52 PM 08/26/2017        Attending Attestation:   Physician Attestation Statement for Resident:  As the supervising MD   Physician Attestation Statement: I have personally seen and examined this patient.   I agree with the above history. -:   As the supervising MD I agree with the above PE.    As the supervising MD I agree with the above treatment, course, plan, and disposition.  I have reviewed and agree with the residents interpretation of the following: lab data and CT scans.  I have reviewed the following: old records at this facility.            Attending ED Notes:   72y F presenting with dark colored stool. She reports similar symptoms the last time she had a bowel obstruction. Initial abdominal exam benign.  No stool in vault for occult blood testing. Work reveals stable anemia. CT imaging without acute findings. Patient will follow up with primary team for outpatient management, return precautions advised.           ED Course     Clinical Impression:   The encounter diagnosis was History of melena.                           Rodolfo Stein MD  Resident  08/26/17 0895       Cami Mack MD  08/27/17 [id number]

## 2017-08-26 NOTE — ED NOTES
Discharge home with family, states understanding to follow up as directed. Out of ED with family via wheelchair

## 2017-08-26 NOTE — DISCHARGE INSTRUCTIONS
Future Appointments  Date Time Provider Department Center   9/7/2017 7:00 AM MP MED-NURSE [facility]   9/12/2017 10:00 AM [facility]   9/19/2017 9:45 AM Horacio Erazo MD [facility]

## 2017-08-26 NOTE — ED TRIAGE NOTES
Passing black coffee ground diarrhea stools since yesterday.  Denies abdominal pain or nausea.    GENERAL: The patient is a frail pale elderly female in no apparent distress. Alert and oriented x4.                                                HEENT: Head is normocephalic and atraumatic. Extraocular muscles are intact. Pupils are equal, round, and reactive to light and accommodation. Nares appeared normal. Mouth is well hydrated and without lesions. Mucous membranes are moist. Posterior pharynx clear of any exudate or lesions.    NECK: Supple. No carotid bruits. No lymphadenopathy or thyromegaly.    LUNGS: Clear to auscultation.    HEART: Regular rate and rhythm without murmur.     ABDOMEN: Soft, nontender, and nondistended. Positive bowel sounds. No hepatosplenomegaly was noted.     EXTREMITIES: Without any cyanosis, clubbing, rash, lesions or edema.     NEUROLOGIC: Cranial nerves II through XII are grossly intact.     PSYCHIATRIC: Flat affect, but denies suicidal or homicidal ideations.    SKIN: No ulceration or induration present.

## 2017-08-26 NOTE — PROVIDER PROGRESS NOTES - EMERGENCY DEPT.
Encounter Date: 8/26/2017    ED Physician Progress Notes       SCRIBE NOTE: I, Hollie Cabrera, am scribing for, and in the presence of,  Dr. Friedman.  Physician Statement: I, Dr. Friedman, personally performed the services described in this documentation as scribed by Hollie Cabrera in my presence, and it is both accurate and complete.     Physician Note:   Time patient was seen by the provider: 10:25 AM      The patient is a 72 y.o. female with hx of: IBS, bowel obstruction, DM, and colon cancer that presents to the ED with a complaint of dark diarrhea since yesterday evening. Pt described the stool resembling coffee grinds. Her last episode was a few minutes before being seen. Pt had an Ex-lap on 4/1/17. At this time she had an extended admission for complications of small bowel obstructions. Associated symptom includes VIVAR (denies SOB at rest). Denies LOC and N/V. Pt was noted to be pale when seen.

## 2017-08-28 ENCOUNTER — OFFICE VISIT (OUTPATIENT)
Dept: GASTROENTEROLOGY | Facility: CLINIC | Age: 72
End: 2017-08-28
Payer: MEDICARE

## 2017-08-28 VITALS
WEIGHT: 150.38 LBS | BODY MASS INDEX: 25.67 KG/M2 | SYSTOLIC BLOOD PRESSURE: 110 MMHG | HEART RATE: 66 BPM | HEIGHT: 64 IN | DIASTOLIC BLOOD PRESSURE: 68 MMHG

## 2017-08-28 DIAGNOSIS — K52.9 CHRONIC DIARRHEA: ICD-10-CM

## 2017-08-28 DIAGNOSIS — R15.9 FULL INCONTINENCE OF FECES: ICD-10-CM

## 2017-08-28 DIAGNOSIS — D64.9 ANEMIA, UNSPECIFIED TYPE: Primary | ICD-10-CM

## 2017-08-28 DIAGNOSIS — K92.1 MELENA: ICD-10-CM

## 2017-08-28 PROCEDURE — 99213 OFFICE O/P EST LOW 20 MIN: CPT | Mod: PBBFAC | Performed by: INTERNAL MEDICINE

## 2017-08-28 PROCEDURE — 1157F ADVNC CARE PLAN IN RCRD: CPT | Mod: ,,, | Performed by: INTERNAL MEDICINE

## 2017-08-28 PROCEDURE — 99213 OFFICE O/P EST LOW 20 MIN: CPT | Mod: S$PBB,,, | Performed by: INTERNAL MEDICINE

## 2017-08-28 PROCEDURE — 99999 PR PBB SHADOW E&M-EST. PATIENT-LVL III: CPT | Mod: PBBFAC,,, | Performed by: INTERNAL MEDICINE

## 2017-08-28 PROCEDURE — 1126F AMNT PAIN NOTED NONE PRSNT: CPT | Mod: ,,, | Performed by: INTERNAL MEDICINE

## 2017-08-28 PROCEDURE — 1159F MED LIST DOCD IN RCRD: CPT | Mod: ,,, | Performed by: INTERNAL MEDICINE

## 2017-08-28 RX ORDER — CHOLECALCIFEROL (VITAMIN D3) 125 MCG
CAPSULE ORAL
COMMUNITY
End: 2018-10-31

## 2017-08-28 NOTE — PROGRESS NOTES
[facility] Established Patient Visit    Reason for Visit:    Chief Complaint   Patient presents with    Diarrhea     wed-sat    Melena       PCP: Rocio Mc    HPI:  Anayeli Judd is a 72 y.o. female here for follow-up of diarrhea and is also complaining of black stools 3 days last week.  I saw her in clinic in  for recurrent partial SBO and diarrhea and performed a colonoscopy in July to assess for diarrhea and a history of microscopic colitis.  I removed some small polyps and sampled the colon which were negative for microscopic colitis.  She was in the ED 2 days ago for this.  Stools were watery for 3 days.  Symptoms cleared-up by  and are now back to her normal which is loose.  She has a BM about 30-60 minutes after eating.  Associated with bloating and gas.  Denies abdominal pain or nausea.  She was admitted to the hospital in April after having surgery and noted to have an episode of melena during that time (no evaluation pursued).  She was on Augmentin for bladder infection due to fecal incontinence.  She is being evaluated by Dr. Harman with [facility] for fecal incontinence and he has noted anal sphincter dysfunction and has referred her to Dr. Erazo for possible sacral nerve stimulator.            ROS:  Constitutional: No fevers, chills, No weight loss, normal appetite  GI: see HPI          PMHX:  has a past medical history of Allergic rhinitis; Arthritis; Blood transfusion; Bowel obstruction; Cervical radiculopathy; Colon cancer (); Diarrhea; Family history of breast cancer; Family history of colon cancer; Fatty liver; GERD (gastroesophageal reflux disease); History of shingles; Hyperlipidemia; Hypothyroidism; Irritable bowel syndrome; Microscopic colitis; Raynaud phenomenon; Raynaud's disease; and Type 2 diabetes mellitus.    PSHX:  has a past surgical history that includes Hysterectomy; Appendectomy; Toe Surgery; Tonsillectomy;  section;  posterolateral fusion with autograft bone and Eun mineralized bone matrix (2/1/13); Carpal tunnel release; Trigger finger release; Back surgery; Cholecystectomy (1965); Colectomy (06/27/2011); and Colonoscopy (N/A, 7/27/2017).    The patient's social and family histories were reviewed by me and updated in the appropriate section of the electronic medical record.    Review of patient's allergies indicates:   Allergen Reactions    Codeine Nausea And Vomiting     Other reaction(s): Itching    Percocet  [oxycodone-acetaminophen]      Other reaction(s): Itching    Sulfa (sulfonamide antibiotics)      Other reaction(s): Nausea  Other reaction(s): Itching    Adhesive Rash       Prior to Admission medications    Medication Sig Start Date End Date Taking? Authorizing Provider   atorvastatin (LIPITOR) 40 MG tablet take 1 tablet by mouth once daily 1/11/17  Yes Alex Leal MD   biotin 5,000 mcg TbDL Take by mouth.   Yes Historical Provider, MD   blood sugar diagnostic Strp Dispense madie contour strips; test blood sugar once daily 3/24/15  Yes Emely Saha MD   blood-glucose meter (CONTOUR METER) kit Please dispense Madie Contour meter and supplies Use as instructed Test Blood sugar once daily 8/15/16  Yes Rocio Mc MD   cholestyramine, with sugar, 4 gram Powd Take 1 Dose by mouth 3 (three) times daily as needed. 8/28/17 8/28/18 Yes Rocio Mc MD   conjugated estrogens (PREMARIN) vaginal cream Place 0.5 g vaginally twice a week. 11/7/16 11/7/17 Yes Rocio Mc MD   gabapentin (NEURONTIN) 600 MG tablet Take 2 tablets (1,200 mg total) by mouth every evening. 7/28/17 7/28/18 Yes Rocio Mc MD   lancets (ONE TOUCH ULTRASOFT LANCETS) Misc Test blood sugar once daily 3/24/15  Yes Emely Saha MD   levothyroxine (SYNTHROID) 125 MCG tablet Take 1 tablet (125 mcg total) by mouth before breakfast. Brand only 7/14/17 7/14/18 Yes Rocio Mc MD   meloxicam  (MOBIC) 15 MG tablet Take 1 tablet (15 mg total) by mouth daily as needed for Pain. With food for arthritis 7/13/17  Yes Rocio Mc MD   metformin (GLUCOPHAGE-XR) 750 MG 24 hr tablet Take 1 tablet (750 mg total) by mouth 2 (two) times daily with meals. 7/13/17 7/13/18 Yes Rocio Mc MD   metronidazole 0.75% (METROCREAM) 0.75 % Crea  6/30/17  Yes Historical Provider, MD   ospemifene (OSPHENA) 60 mg Tab Take 60 mg by mouth once daily. 5/16/16  Yes Rocio Mc MD   pantoprazole (PROTONIX) 40 MG tablet Take 1 tablet (40 mg total) by mouth once daily. 7/13/17 7/13/18 Yes Rocio Mc MD   POLYETHYLENE GLYCOL 3350 (MIRALAX ORAL) Take by mouth.   Yes Historical Provider, MD   senna-docusate 8.6-50 mg (PERICOLACE) 8.6-50 mg per tablet Take 1 tablet by mouth once daily. 4/20/17  Yes Sanjuana Redding PA-C   VIT A/VIT C/VIT E/ZINC/COPPER (ICAPS AREDS ORAL) Take by mouth 2 (two) times daily.   Yes Historical Provider, MD   vitamin D 1000 units Tab Take 185 mg by mouth once daily. D3   Yes Historical Provider, MD   azelastine (ASTELIN) 137 mcg nasal spray 1 spray (137 mcg total) by Nasal route 2 (two) times daily. 3/11/15 8/21/17  Darci Sheppard MD   cyanocobalamin 1,000 mcg/mL injection 1,000 mcg.    Historical Provider, MD   diclofenac sodium 1 % Gel Apply 2 g topically once daily. 8/28/17   Rocio Mc MD   diphenhydrAMINE (BENADRYL) 25 mg capsule Take 1 each (25 mg total) by mouth nightly as needed for Itching, Allergies or Insomnia. 4/20/17   Sanjuana Redding PA-C   fluticasone (FLONASE) 50 mcg/actuation nasal spray 2 sprays by Each Nare route once daily. 2/18/15   Historical Provider, MD   hydrocortisone 2.5 % cream  12/11/15   Historical Provider, MD   ipratropium (ATROVENT) 0.06 % nasal spray  5/12/16   Historical Provider, MD   magnesium 30 mg Tab Take 500 capsules by mouth. Patient's taking magnesium 500mg QD    Historical Provider, MD   ondansetron (ZOFRAN) 8 MG  "tablet Take 1 tablet (8 mg total) by mouth every 8 (eight) hours as needed for Nausea. 3/29/17   Rocio Mc MD         Objective Findings:  Vital Signs:  /68   Pulse 66   Ht 5' 4" (1.626 m)   Wt 68.2 kg (150 lb 5.7 oz)   LMP  (LMP Unknown)   BMI 25.81 kg/m²  Body mass index is 25.81 kg/m².    Physical Exam:  General Appearance:  Well appearing in no acute distress, appears stated age  Head:  Normocephalic, atraumatic  Eyes:  No scleral icterus or pallor, EOMI      Labs:  Lab Results   Component Value Date    WBC 5.39 08/26/2017    HGB 11.2 (L) 08/26/2017    HCT 34.5 (L) 08/26/2017    MCV 85 08/26/2017    RDW 16.9 (H) 08/26/2017     (L) 08/26/2017    GRAN 2.7 08/26/2017    GRAN 50.7 08/26/2017    LYMPH 1.8 08/26/2017    LYMPH 34.0 08/26/2017    MONO 0.6 08/26/2017    MONO 10.8 08/26/2017    EOS 0.2 08/26/2017    BASO 0.02 08/26/2017     Lab Results   Component Value Date     08/26/2017    K 4.2 08/26/2017     08/26/2017    CO2 22 (L) 08/26/2017     (H) 08/26/2017    BUN 16 08/26/2017    CREATININE 0.8 08/26/2017    CALCIUM 8.8 08/26/2017    PROT 6.8 08/26/2017    ALBUMIN 3.2 (L) 08/26/2017    BILITOT 0.4 08/26/2017    ALKPHOS 57 08/26/2017    AST 26 08/26/2017    ALT 16 08/26/2017     Stool tests reviewed.          Imaging:  CT abdomen 8/26/17 negative for obstruction or inflammation        Assessment:  Anayeli Judd is a 72 y.o. female here with:  1. Recurrent small bowel obstructions secondary to post-surgical adhesions - s/p ex-lap with lysis of adhesions 9/2014 and 4/2017.    2. Changes in bowel habits after surgery with primary diarrhea, but alternates sometimes with solid, thin stools.  Might be related to IBS, antibiotic use, or SIBO.    3. History of microscopic colitis (lymphocytic) but biopsies done in July of this year are negative for microscopic colitis.  4. History of colon cancer stage IIA in 2011 s/p resection and chemotherapy - followed by oncology " and has had no signs of recurrence.  Colonoscopy in July with small adenomas removed.  5. Recent episode of watery, black stools.  No clear signs of GI bleeding and blood counts were actually up when she went to ED for evaluation.       Recommendations:  1. Avoid laxatives  2. Will check blood counts in a week  3. No EGD for now unless more convincing evidence for GI bleeding presents itself  4. Can try Questran prescribed by her PCP  5. Take Eunice [employer] probiotic      Order summary:  Orders Placed This Encounter   Procedures    CBC auto differential         Manjit Alvarez MD

## 2017-08-28 NOTE — LETTER
September 4, 2017      Rocio Mc MD  [address]  [address]  [place] [address]           [facility]  [address]  [place] [address]  Phone: [phone]  Fax: [phone]          Patient: Anayeli Judd   MR Number: [id number]   YOB: 1945   Date of Visit: 8/28/2017       Dear Dr. Rocio Mc:    Thank you for referring Anayeli Judd to me for evaluation. Attached you will find relevant portions of my assessment and plan of care.    If you have questions, please do not hesitate to call me. I look forward to following Anayeli Judd along with you.    Sincerely,    Manjit Alvarez MD    Enclosure  CC:  No Recipients    If you would like to receive this communication electronically, please contact [email or web address] or [phone] to request more information on [employer] access.    For providers and/or their staff who would like to refer a patient to [facility], please contact us through our one-stop-shop provider referral line, [facility], at [phone].    If you feel you have received this communication in error or would no longer like to receive these types of communications, please e-mail [email or web address]

## 2017-08-28 NOTE — PATIENT INSTRUCTIONS
Try a probiotic such as [employer] Colon [email or web address].  If this does not work after 2-3 weeks, then consider using a special antibiotic called Rifaximin to treat for bacterial imbalances.      Avoid laxatives for now.    Okay to try the Cholestyramine prescribed by our PCP.

## 2017-09-05 ENCOUNTER — TELEPHONE (OUTPATIENT)
Dept: GASTROENTEROLOGY | Facility: CLINIC | Age: 72
End: 2017-09-05

## 2017-09-05 ENCOUNTER — LAB VISIT (OUTPATIENT)
Dept: LAB | Facility: HOSPITAL | Age: 72
End: 2017-09-05
Attending: INTERNAL MEDICINE
Payer: MEDICARE

## 2017-09-05 DIAGNOSIS — D64.9 ANEMIA, UNSPECIFIED TYPE: ICD-10-CM

## 2017-09-05 DIAGNOSIS — K92.1 MELENA: ICD-10-CM

## 2017-09-05 LAB
BASOPHILS # BLD AUTO: 0.03 K/UL
BASOPHILS NFR BLD: 0.5 %
DIFFERENTIAL METHOD: ABNORMAL
EOSINOPHIL # BLD AUTO: 0.2 K/UL
EOSINOPHIL NFR BLD: 3.2 %
ERYTHROCYTE [DISTWIDTH] IN BLOOD BY AUTOMATED COUNT: 16.2 %
HCT VFR BLD AUTO: 36.1 %
HGB BLD-MCNC: 11.4 G/DL
LYMPHOCYTES # BLD AUTO: 1.9 K/UL
LYMPHOCYTES NFR BLD: 34.1 %
MCH RBC QN AUTO: 26.9 PG
MCHC RBC AUTO-ENTMCNC: 31.6 G/DL
MCV RBC AUTO: 85 FL
MONOCYTES # BLD AUTO: 0.6 K/UL
MONOCYTES NFR BLD: 10.2 %
NEUTROPHILS # BLD AUTO: 2.9 K/UL
NEUTROPHILS NFR BLD: 51.8 %
PLATELET # BLD AUTO: 139 K/UL
PMV BLD AUTO: 11.5 FL
RBC # BLD AUTO: 4.24 M/UL
WBC # BLD AUTO: 5.57 K/UL

## 2017-09-05 PROCEDURE — 36415 COLL VENOUS BLD VENIPUNCTURE: CPT | Mod: PO

## 2017-09-05 PROCEDURE — 85025 COMPLETE CBC W/AUTO DIFF WBC: CPT

## 2017-09-05 NOTE — TELEPHONE ENCOUNTER
----- Message from Manjit Alvarez MD sent at 9/5/2017  1:48 PM CDT -----  Blood counts still look good.  No intervention needed.    MA to call patient with results.

## 2017-09-05 NOTE — TELEPHONE ENCOUNTER
----- Message from Allison Serna MA sent at 9/5/2017  2:22 PM CDT -----  Contact: self  [phone]  States she is returning your call.

## 2017-09-14 ENCOUNTER — TELEPHONE (OUTPATIENT)
Dept: GASTROENTEROLOGY | Facility: CLINIC | Age: 72
End: 2017-09-14

## 2017-09-14 NOTE — TELEPHONE ENCOUNTER
Received call from Dr. Mc., she is requesting Dr. Alvarez call the patient to address the patient's concerns.    Mrs. Judd is complaining of chronic abdominal pain.  Please call patient at [phone] or you can contact Dr. Mc at [phone].    Urgent message routed to Dr. Alvarez.

## 2017-09-14 NOTE — TELEPHONE ENCOUNTER
Spoke with the patient by phone.  Having abdominal pain, bloating, gas, and abdominal distention.  Her lower abdomen is tender to touch as well.  Symptoms started after taking cholestyramine.  She also started the [employer] probiotic around that time, but I doubt that this is related.  Her bowel movements have decreased and she complaints of incomplete evacuation.  I think she is having side effects to the cholestyramine.  She took some this morning.    I advised her to stop taking this medication and also stop taking the probiotic for now.  I also advised that she use Miralax.  If the pain becomes severe tonight, I told her to go to the ER.  We will call her tomorrow to follow-up on her symptoms.    Once she is over this episode.  I discussed giving her a course of Rifaximin which she is okay with.

## 2017-09-15 NOTE — TELEPHONE ENCOUNTER
Spoke with patient.    Patient stated last night went well, she is passing gas and her bowels are moving. It only hurts when she moves around a lot. Patient does not feel the need to go to the ER.    Message routed to Dr. Alvarez.

## 2017-09-15 NOTE — TELEPHONE ENCOUNTER
Attempted to contact patient, but no answer. Left voicemail for patient to return call to our clinic.

## 2017-09-19 ENCOUNTER — OFFICE VISIT (OUTPATIENT)
Dept: SURGERY | Facility: CLINIC | Age: 72
End: 2017-09-19
Payer: MEDICARE

## 2017-09-19 VITALS
HEIGHT: 64 IN | WEIGHT: 147.69 LBS | BODY MASS INDEX: 25.21 KG/M2 | DIASTOLIC BLOOD PRESSURE: 62 MMHG | HEART RATE: 65 BPM | SYSTOLIC BLOOD PRESSURE: 108 MMHG

## 2017-09-19 DIAGNOSIS — R15.0 INCOMPLETE DEFECATION: Primary | ICD-10-CM

## 2017-09-19 PROCEDURE — 1125F AMNT PAIN NOTED PAIN PRSNT: CPT | Mod: ,,, | Performed by: COLON & RECTAL SURGERY

## 2017-09-19 PROCEDURE — 99213 OFFICE O/P EST LOW 20 MIN: CPT | Mod: PBBFAC | Performed by: COLON & RECTAL SURGERY

## 2017-09-19 PROCEDURE — 1159F MED LIST DOCD IN RCRD: CPT | Mod: ,,, | Performed by: COLON & RECTAL SURGERY

## 2017-09-19 PROCEDURE — 99999 PR PBB SHADOW E&M-EST. PATIENT-LVL III: CPT | Mod: PBBFAC,,, | Performed by: COLON & RECTAL SURGERY

## 2017-09-19 PROCEDURE — 1157F ADVNC CARE PLAN IN RCRD: CPT | Mod: ,,, | Performed by: COLON & RECTAL SURGERY

## 2017-09-19 PROCEDURE — 99214 OFFICE O/P EST MOD 30 MIN: CPT | Mod: S$PBB,,, | Performed by: COLON & RECTAL SURGERY

## 2017-09-20 NOTE — PROGRESS NOTES
[facility] Office Visit    SUBJECTIVE:     Chief Complaint: Fecal incontinence    History of Present Illness:  Patient is a 72 y.o. female who had a transverse colon cancer Stage 2A treated with an open transverse colectomy with mobilization or hepatic/splenic flexures as well as the sigmoid colon in  by Dr. Aguirre. She subsequently developed small bowel obstructions in  and, most recently, in 2017 which required exploratory laparotomies with extensive lysis of adhesions. Her surgery in April involved small bowel repair x 14 and placement of a gastrostomy tube. The patient states that she has had worsening fecal incontinence since her last surgery. She is incontinent to both gas and stool. She wears a pad at all times. Her bowel movements are loose and she uses miralax and colace daily because she was told she must never become constipated. She also complains of recurrent UTI and urinary incontinence. She states that even when her bowel movements are pasty she has leakage but she also complains of the feeling of incomplete evacuation both with liquid and more solid stoolhowever she does not have formed bowel movements    Review of patient's allergies indicates:   Allergen Reactions    Codeine Nausea And Vomiting     Other reaction(s): Itching    Percocet  [oxycodone-acetaminophen]      Other reaction(s): Itching    Sulfa (sulfonamide antibiotics)      Other reaction(s): Nausea  Other reaction(s): Itching    Adhesive Rash       Past Medical History:   Diagnosis Date    Allergic rhinitis     Arthritis     Blood transfusion     during delivery and     Bowel obstruction     Cervical radiculopathy     followed by dr cloud    Colon cancer     transverse colon; resected; Stage IIA (pT3 pN0 MX)    Diarrhea     Family history of breast cancer     Family history of colon cancer     Fatty liver     GERD (gastroesophageal reflux disease)     History of shingles     Hyperlipidemia      Hypothyroidism     Irritable bowel syndrome     Microscopic colitis     treated     Raynaud phenomenon     Raynaud's disease     Type 2 diabetes mellitus      Past Surgical History:   Procedure Laterality Date    APPENDECTOMY      BACK SURGERY      CARPAL TUNNEL RELEASE      bilateral      SECTION      CHOLECYSTECTOMY  1965    COLECTOMY  2011    Transverse colon resection by Dr. Aguirre    COLONOSCOPY N/A 2017    Procedure: COLONOSCOPY;  Surgeon: Manjit Alvarez MD;  Location: [facility] ([address] [place] [address]);  Service: Endoscopy;  Laterality: N/A;    HYSTERECTOMY      posterolateral fusion with autograft bone and [facility] mineralized bone matrix  13    at [facility] for lumbar spine stenosis    TOE SURGERY      TONSILLECTOMY      TRIGGER FINGER RELEASE       Family History   Problem Relation Age of Onset    Heart disease Father 50     Mi age 50    Colon cancer Father     Bladder Cancer Mother      non smoker    Cataracts Mother     Glaucoma Mother     Heart disease Mother     Hyperlipidemia Mother     Kidney disease Mother     Breast cancer Sister 79    Arthritis Daughter     Asthma Daughter     Depression Daughter     Hypertension Daughter     Stroke Daughter 40    Arthritis Brother     Colon cancer Brother 70    Alcohol abuse Brother     Parkinsonism Brother     Alcohol abuse Brother     Depression Daughter     Celiac disease Neg Hx     Cirrhosis Neg Hx     Colon polyps Neg Hx     Crohn's disease Neg Hx     Cystic fibrosis Neg Hx     Esophageal cancer Neg Hx     Hemochromatosis Neg Hx     Inflammatory bowel disease Neg Hx     Irritable bowel syndrome Neg Hx     Liver cancer Neg Hx     Liver disease Neg Hx     Rectal cancer Neg Hx     Stomach cancer Neg Hx     Ulcerative colitis Neg Hx     Eliazar's disease Neg Hx     Amblyopia Neg Hx     Blindness Neg Hx     Macular degeneration Neg Hx     Retinal detachment Neg Hx     Strabismus Neg Hx     Melanoma  Neg Hx      Social History   Substance Use Topics    Smoking status: Never Smoker    Smokeless tobacco: Never Used    Alcohol use 3.0 oz/week     5 Glasses of wine per week      Comment: socially      Review of Systems:  Constitutional: no fever or chills  Respiratory: no cough or shortness of breath  Cardiovascular: no chest pain or palpitations  Gastrointestinal: tolerating diet, incontinence of stool and gas  Genitourinary: urinary incontinence    OBJECTIVE:     Vital Signs (Most Recent)  Pulse: 65 (09/19/17 0949)  BP: 108/62 (09/19/17 0949)    Physical Exam:  General: White female in NAD sitting in chair in clinic  Neuro: aaox4 maex4 perrl  Respiratory: resps even unlabored  Abdomen: soft, NTND  Extremities: Warm dry and intact  Anorectal Exam:    Anal Skin: Normal    Digital Rectal Exam:  Resting Tone low  Squeeze low  Relaxation with bear down present  Mass none  Rectocele  absent  Tenderness  absent      ASSESSMENT/PLAN:          Diagnoses and all orders for this visit:    Incomplete defecation  -     FL Defecogram; Future     Have started her on FiberCon therapy thompson scheduled for definite photography in order to assess pelvic floor prior to InterStim

## 2017-09-25 ENCOUNTER — TELEPHONE (OUTPATIENT)
Dept: RADIOLOGY | Facility: HOSPITAL | Age: 72
End: 2017-09-25

## 2017-09-26 ENCOUNTER — HOSPITAL ENCOUNTER (OUTPATIENT)
Dept: RADIOLOGY | Facility: HOSPITAL | Age: 72
Discharge: HOME | End: 2017-09-26
Attending: COLON & RECTAL SURGERY
Payer: MEDICARE

## 2017-09-26 ENCOUNTER — OFFICE VISIT (OUTPATIENT)
Dept: SURGERY | Facility: CLINIC | Age: 72
End: 2017-09-26
Payer: MEDICARE

## 2017-09-26 VITALS
BODY MASS INDEX: 25.18 KG/M2 | HEART RATE: 66 BPM | SYSTOLIC BLOOD PRESSURE: 117 MMHG | HEIGHT: 64 IN | WEIGHT: 147.5 LBS | DIASTOLIC BLOOD PRESSURE: 61 MMHG

## 2017-09-26 DIAGNOSIS — R15.0 INCOMPLETE DEFECATION: ICD-10-CM

## 2017-09-26 DIAGNOSIS — R15.9 FULL INCONTINENCE OF FECES: Primary | ICD-10-CM

## 2017-09-26 PROCEDURE — 1157F ADVNC CARE PLAN IN RCRD: CPT | Mod: ,,, | Performed by: COLON & RECTAL SURGERY

## 2017-09-26 PROCEDURE — 1159F MED LIST DOCD IN RCRD: CPT | Mod: ,,, | Performed by: COLON & RECTAL SURGERY

## 2017-09-26 PROCEDURE — 99213 OFFICE O/P EST LOW 20 MIN: CPT | Mod: S$PBB,,, | Performed by: COLON & RECTAL SURGERY

## 2017-09-26 PROCEDURE — 99999 PR PBB SHADOW E&M-EST. PATIENT-LVL III: CPT | Mod: PBBFAC,,, | Performed by: COLON & RECTAL SURGERY

## 2017-09-26 PROCEDURE — 1126F AMNT PAIN NOTED NONE PRSNT: CPT | Mod: ,,, | Performed by: COLON & RECTAL SURGERY

## 2017-09-26 PROCEDURE — 74270 X-RAY XM COLON 1CNTRST STD: CPT | Mod: 26,GC,, | Performed by: RADIOLOGY

## 2017-09-26 PROCEDURE — 74270 X-RAY XM COLON 1CNTRST STD: CPT | Mod: TC

## 2017-09-26 PROCEDURE — 99213 OFFICE O/P EST LOW 20 MIN: CPT | Mod: PBBFAC,25 | Performed by: COLON & RECTAL SURGERY

## 2017-09-26 NOTE — PROGRESS NOTES
Patient is a 72 y.o. female who had a transverse colon cancer Stage 2A treated with an open transverse colectomy with mobilization or hepatic/splenic flexures as well as the sigmoid colon in  by Dr. Aguirre. She subsequently developed small bowel obstructions in  and, most recently, in 2017 which required exploratory laparotomies with extensive lysis of adhesions. Her surgery in April involved small bowel repair x 14 and placement of a gastrostomy tube. The patient states that she has had worsening fecal incontinence since her last surgery.Since starting fibercon her symptoms have improved. Here defecography was normal    Review of patient's allergies indicates:   Allergen Reactions    Codeine Nausea And Vomiting     Other reaction(s): Itching    Percocet  [oxycodone-acetaminophen]      Other reaction(s): Itching    Sulfa (sulfonamide antibiotics)      Other reaction(s): Nausea  Other reaction(s): Itching    Adhesive Rash       Past Medical History:   Diagnosis Date    Allergic rhinitis     Arthritis     Blood transfusion     during delivery and     Bowel obstruction     Cervical radiculopathy     followed by dr cloud    Colon cancer     transverse colon; resected; Stage IIA (pT3 pN0 MX)    Diarrhea     Family history of breast cancer     Family history of colon cancer     Fatty liver     GERD (gastroesophageal reflux disease)     History of shingles     Hyperlipidemia     Hypothyroidism     Irritable bowel syndrome     Microscopic colitis     treated     Raynaud phenomenon     Raynaud's disease     Type 2 diabetes mellitus      Past Surgical History:   Procedure Laterality Date    APPENDECTOMY      BACK SURGERY      CARPAL TUNNEL RELEASE      bilateral      SECTION      CHOLECYSTECTOMY  1965    COLECTOMY  2011    Transverse colon resection by Dr. Aguirre    COLONOSCOPY N/A 2017    Procedure: COLONOSCOPY;  Surgeon: Manjit Alvarez MD;   Location: [facility] (4[facility] FLR);  Service: Endoscopy;  Laterality: N/A;    HYSTERECTOMY      posterolateral fusion with autograft bone and Eun mineralized bone matrix  2/1/13    at [facility] for lumbar spine stenosis    TOE SURGERY      TONSILLECTOMY      TRIGGER FINGER RELEASE       Family History   Problem Relation Age of Onset    Heart disease Father 50     Mi age 50    Colon cancer Father     Bladder Cancer Mother      non smoker    Cataracts Mother     Glaucoma Mother     Heart disease Mother     Hyperlipidemia Mother     Kidney disease Mother     Breast cancer Sister 79    Arthritis Daughter     Asthma Daughter     Depression Daughter     Hypertension Daughter     Stroke Daughter 40    Arthritis Brother     Colon cancer Brother 70    Alcohol abuse Brother     Parkinsonism Brother     Alcohol abuse Brother     Depression Daughter     Celiac disease Neg Hx     Cirrhosis Neg Hx     Colon polyps Neg Hx     Crohn's disease Neg Hx     Cystic fibrosis Neg Hx     Esophageal cancer Neg Hx     Hemochromatosis Neg Hx     Inflammatory bowel disease Neg Hx     Irritable bowel syndrome Neg Hx     Liver cancer Neg Hx     Liver disease Neg Hx     Rectal cancer Neg Hx     Stomach cancer Neg Hx     Ulcerative colitis Neg Hx     Eliazar's disease Neg Hx     Amblyopia Neg Hx     Blindness Neg Hx     Macular degeneration Neg Hx     Retinal detachment Neg Hx     Strabismus Neg Hx     Melanoma Neg Hx      Social History   Substance Use Topics    Smoking status: Never Smoker    Smokeless tobacco: Never Used    Alcohol use 3.0 oz/week     5 Glasses of wine per week      Comment: socially      Review of Systems:  Constitutional: no fever or chills  Respiratory: no cough or shortness of breath  Cardiovascular: no chest pain or palpitations  Gastrointestinal: tolerating diet, incontinence of stool and gas  Genitourinary: urinary incontinence    OBJECTIVE:     Vital Signs (Most Recent)  Pulse:  65 (09/19/17 0949)  BP: 108/62 (09/19/17 0949)    Physical Exam:  General: White female in NAD sitting in chair in clinic  Neuro: aaox4 maex4 perrl  Respiratory: resps even unlabored  Abdomen: soft, NTND  Extremities: Warm dry and intact  Anorectal Exam:    Anal Skin: Normal    Digital Rectal Exam:  Resting Tone low  Squeeze low  Relaxation with bear down present  Mass none  Rectocele  absent  Tenderness  absent      ASSESSMENT/PLAN:          Fecal incontinence.Improved significantly with Fiberconn  Continue , no indication at this juncture for Interstim   Follow up PRN

## 2017-10-23 ENCOUNTER — LAB VISIT (OUTPATIENT)
Dept: LAB | Facility: OTHER | Age: 72
End: 2017-10-23
Attending: INTERNAL MEDICINE
Payer: MEDICARE

## 2017-10-23 DIAGNOSIS — R73.09 ELEVATED GLUCOSE: ICD-10-CM

## 2017-10-23 DIAGNOSIS — E03.8 OTHER SPECIFIED HYPOTHYROIDISM: ICD-10-CM

## 2017-10-23 LAB
ALBUMIN SERPL BCP-MCNC: 3.7 G/DL
ALP SERPL-CCNC: 59 U/L
ALT SERPL W/O P-5'-P-CCNC: 27 U/L
ANION GAP SERPL CALC-SCNC: 10 MMOL/L
AST SERPL-CCNC: 30 U/L
BILIRUB SERPL-MCNC: 0.7 MG/DL
BUN SERPL-MCNC: 16 MG/DL
CALCIUM SERPL-MCNC: 9.1 MG/DL
CHLORIDE SERPL-SCNC: 105 MMOL/L
CO2 SERPL-SCNC: 23 MMOL/L
CREAT SERPL-MCNC: 0.9 MG/DL
EST. GFR  (AFRICAN AMERICAN): >60 ML/MIN/1.73 M^2
EST. GFR  (NON AFRICAN AMERICAN): >60 ML/MIN/1.73 M^2
ESTIMATED AVG GLUCOSE: 140 MG/DL
GLUCOSE SERPL-MCNC: 152 MG/DL
HBA1C MFR BLD HPLC: 6.5 %
POTASSIUM SERPL-SCNC: 4.6 MMOL/L
PROT SERPL-MCNC: 7.2 G/DL
SODIUM SERPL-SCNC: 138 MMOL/L
T4 FREE SERPL-MCNC: 1.43 NG/DL
TSH SERPL DL<=0.005 MIU/L-ACNC: 0.12 UIU/ML

## 2017-10-23 PROCEDURE — 80053 COMPREHEN METABOLIC PANEL: CPT

## 2017-10-23 PROCEDURE — 83036 HEMOGLOBIN GLYCOSYLATED A1C: CPT

## 2017-10-23 PROCEDURE — 84443 ASSAY THYROID STIM HORMONE: CPT

## 2017-10-23 PROCEDURE — 36415 COLL VENOUS BLD VENIPUNCTURE: CPT

## 2017-10-23 PROCEDURE — 84439 ASSAY OF FREE THYROXINE: CPT

## 2017-10-30 ENCOUNTER — TELEPHONE (OUTPATIENT)
Dept: SURGERY | Facility: CLINIC | Age: 72
End: 2017-10-30

## 2017-10-30 NOTE — TELEPHONE ENCOUNTER
----- Message from Mariama Bolton sent at 10/30/2017  2:25 PM CDT -----  Contact: Pt:[phone]  Pt called and states she would like to speak with 's nurse in regards to getting an appointment scheduled for tomorrow 10/31. Pt states she received a text from  telling her to schedule for tomorrow.

## 2017-10-31 ENCOUNTER — OFFICE VISIT (OUTPATIENT)
Dept: SURGERY | Facility: CLINIC | Age: 72
End: 2017-10-31
Payer: MEDICARE

## 2017-10-31 ENCOUNTER — HOSPITAL ENCOUNTER (OUTPATIENT)
Dept: RADIOLOGY | Facility: HOSPITAL | Age: 72
Discharge: HOME | End: 2017-10-31
Attending: COLON & RECTAL SURGERY
Payer: MEDICARE

## 2017-10-31 VITALS
WEIGHT: 146.81 LBS | DIASTOLIC BLOOD PRESSURE: 66 MMHG | HEART RATE: 72 BPM | BODY MASS INDEX: 25.2 KG/M2 | SYSTOLIC BLOOD PRESSURE: 107 MMHG

## 2017-10-31 DIAGNOSIS — R19.7 DIARRHEA, UNSPECIFIED TYPE: Primary | ICD-10-CM

## 2017-10-31 DIAGNOSIS — R10.9 ABDOMINAL PAIN, UNSPECIFIED ABDOMINAL LOCATION: ICD-10-CM

## 2017-10-31 DIAGNOSIS — R10.9 ABDOMINAL PAIN, UNSPECIFIED ABDOMINAL LOCATION: Primary | ICD-10-CM

## 2017-10-31 PROCEDURE — 99214 OFFICE O/P EST MOD 30 MIN: CPT | Mod: S$PBB,,, | Performed by: COLON & RECTAL SURGERY

## 2017-10-31 PROCEDURE — 74020 XR ABDOMEN FLAT AND ERECT: CPT | Mod: 26,,, | Performed by: RADIOLOGY

## 2017-10-31 PROCEDURE — 99213 OFFICE O/P EST LOW 20 MIN: CPT | Mod: PBBFAC,25 | Performed by: COLON & RECTAL SURGERY

## 2017-10-31 PROCEDURE — 74020 XR ABDOMEN FLAT AND ERECT: CPT | Mod: TC

## 2017-10-31 PROCEDURE — 99999 PR PBB SHADOW E&M-EST. PATIENT-LVL III: CPT | Mod: PBBFAC,,, | Performed by: COLON & RECTAL SURGERY

## 2017-11-03 NOTE — PROGRESS NOTES
Patient is a 72 y.o. female who had a transverse colon cancer Stage 2A treated with an open transverse colectomy with mobilization or hepatic/splenic flexures as well as the sigmoid colon in  by Dr. Aguirre. She subsequently developed small bowel obstructions in  and, most recently, in 2017 which required exploratory laparotomies with extensive lysis of adhesions. Her surgery in April involved small bowel repair x 14 and placement of a gastrostomy tube. The fibercon has been helping but the patient has  deveveloped new onset abdominal pain. No nausea , no vomiting, ans she is still moving her bowels    Review of patient's allergies indicates:   Allergen Reactions    Codeine Nausea And Vomiting     Other reaction(s): Itching    Percocet  [oxycodone-acetaminophen]      Other reaction(s): Itching    Sulfa (sulfonamide antibiotics)      Other reaction(s): Nausea  Other reaction(s): Itching    Adhesive Rash       Past Medical History:   Diagnosis Date    Allergic rhinitis     Arthritis     Blood transfusion     during delivery and     Bowel obstruction     Cervical radiculopathy     followed by dr cloud    Colon cancer     transverse colon; resected; Stage IIA (pT3 pN0 MX)    Diarrhea     Family history of breast cancer     Family history of colon cancer     Fatty liver     GERD (gastroesophageal reflux disease)     History of shingles     Hyperlipidemia     Hypothyroidism     Irritable bowel syndrome     Microscopic colitis     treated     Raynaud phenomenon     Raynaud's disease     Type 2 diabetes mellitus      Past Surgical History:   Procedure Laterality Date    APPENDECTOMY      BACK SURGERY      CARPAL TUNNEL RELEASE      bilateral      SECTION      CHOLECYSTECTOMY  1965    COLECTOMY  2011    Transverse colon resection by Dr. Aguirre    COLONOSCOPY N/A 2017    Procedure: COLONOSCOPY;  Surgeon: Manjit Alvarez MD;  Location: [address]  [facility]);  Service: Endoscopy;  Laterality: N/A;    HYSTERECTOMY      posterolateral fusion with autograft bone and [place] mineralized bone matrix  2/1/13    at [facility] for lumbar spine stenosis    TOE SURGERY      TONSILLECTOMY      TRIGGER FINGER RELEASE       Family History   Problem Relation Age of Onset    Heart disease Father 50     Mi age 50    Colon cancer Father     Bladder Cancer Mother      non smoker    Cataracts Mother     Glaucoma Mother     Heart disease Mother     Hyperlipidemia Mother     Kidney disease Mother     Breast cancer Sister 79    Arthritis Daughter     Asthma Daughter     Depression Daughter     Hypertension Daughter     Stroke Daughter 40    Arthritis Brother     Colon cancer Brother 70    Alcohol abuse Brother     Parkinsonism Brother     Alcohol abuse Brother     Depression Daughter     Celiac disease Neg Hx     Cirrhosis Neg Hx     Colon polyps Neg Hx     Crohn's disease Neg Hx     Cystic fibrosis Neg Hx     Esophageal cancer Neg Hx     Hemochromatosis Neg Hx     Inflammatory bowel disease Neg Hx     Irritable bowel syndrome Neg Hx     Liver cancer Neg Hx     Liver disease Neg Hx     Rectal cancer Neg Hx     Stomach cancer Neg Hx     Ulcerative colitis Neg Hx     Eliazar's disease Neg Hx     Amblyopia Neg Hx     Blindness Neg Hx     Macular degeneration Neg Hx     Retinal detachment Neg Hx     Strabismus Neg Hx     Melanoma Neg Hx      Social History   Substance Use Topics    Smoking status: Never Smoker    Smokeless tobacco: Never Used    Alcohol use 3.0 oz/week     5 Glasses of wine per week      Comment: socially      Review of Systems:  Constitutional: no fever or chills  Respiratory: no cough or shortness of breath  Cardiovascular: no chest pain or palpitations  Gastrointestinal: tolerating diet, incontinence of stool and gas  Genitourinary: urinary incontinence    OBJECTIVE:     Vital Signs (Most Recent)  Pulse: 65 (09/19/17 0949)  BP:  108/62 (09/19/17 0949)    Physical Exam:  General: White female in NAD sitting in chair in clinic  Neuro: aaox4 maex4 perrl  Respiratory: resps even unlabored  Abdomen: soft, non tender no distention   Extremities: Warm dry and intact  Anorectal Exam:    Anal Skin: Normal    Digital Rectal Exam:  Resting Tone low  Squeeze low  Relaxation with bear down present  Mass none  Rectocele  absent  Tenderness  absent      ASSESSMENT/PLAN:          Abdominal pain,  Due to her extensive surgicl history will check a KUB and CBC

## 2017-11-08 ENCOUNTER — PATIENT MESSAGE (OUTPATIENT)
Dept: SURGERY | Facility: CLINIC | Age: 72
End: 2017-11-08

## 2017-11-08 ENCOUNTER — OFFICE VISIT (OUTPATIENT)
Dept: GENETICS | Facility: CLINIC | Age: 72
End: 2017-11-08
Payer: MEDICARE

## 2017-11-08 VITALS — BODY MASS INDEX: 26.57 KG/M2 | WEIGHT: 149.94 LBS | HEIGHT: 63 IN

## 2017-11-08 DIAGNOSIS — Z85.038 HISTORY OF COLON CANCER: ICD-10-CM

## 2017-11-08 DIAGNOSIS — Z71.83 ENCOUNTER FOR NONPROCREATIVE GENETIC COUNSELING: ICD-10-CM

## 2017-11-08 DIAGNOSIS — Z80.0 FAMILY HISTORY OF COLON CANCER: Primary | ICD-10-CM

## 2017-11-08 PROCEDURE — 99214 OFFICE O/P EST MOD 30 MIN: CPT | Mod: PBBFAC,PO | Performed by: GENETIC COUNSELOR, MS

## 2017-11-08 PROCEDURE — 99499 UNLISTED E&M SERVICE: CPT | Mod: S$PBB,,, | Performed by: GENETIC COUNSELOR, MS

## 2017-11-08 PROCEDURE — 99999 PR PBB SHADOW E&M-EST. PATIENT-LVL IV: CPT | Mod: PBBFAC,,, | Performed by: GENETIC COUNSELOR, MS

## 2017-11-08 PROCEDURE — 96040 PR GENETIC COUNSELING, EACH 30 MIN: CPT | Mod: ,,, | Performed by: GENETIC COUNSELOR, MS

## 2017-11-08 NOTE — LETTER
November 10, 2017      Rocio Mc MD  [address]  [address]  [facility] [address]           [facility] - Gen[facility]  [address] [facility] [address]  Phone: [phone]          Patient: Anayeli Judd   MR Number: [id number]   YOB: 1945   Date of Visit: 11/8/2017       Dear Dr. Rocio Mc:    Thank you for referring Anayeli Judd to me for evaluation. Attached you will find relevant portions of my assessment and plan of care.    If you have questions, please do not hesitate to call me. I look forward to following Anayeli Judd along with you.    Sincerely,    Ira Shafer, MS    Enclosure  CC:  No Recipients    If you would like to receive this communication electronically, please contact [email or web address] or [phone] to request more information on [facility] Link access.    For providers and/or their staff who would like to refer a patient to [facility], please contact us through our one-stop-shop provider referral line, [facility], at [phone].    If you feel you have received this communication in error or would no longer like to receive these types of communications, please e-mail [email or web address]

## 2017-11-13 NOTE — PROGRESS NOTES
Dutch   DOS: 17  : 41  MRN: [id number]      REFERING MD: Rocio Mc MD    REASON FOR CONSULT: Our Medical Genetic Service was asked to evaluate this 72-year-old  female for her personal and family history of colon cancer. Ms. Judd presents to this visit accompanied by her daughter Cyndee.     HISTORY OF PRESENT ILLNESS: Ms. Judd was diagnosed with colon cancer in  (at the age of 66). She has a long history of constipation and was reportedly diagnosed with IBD. The patient underwent a transverse colectomy on 11. Ms. Judd continued to follow-up with GI and oncology. Her last colonoscopy was on 17. Three benign colon polyps were removed and a repeat was recommended in 3 years. The patient does not recall ever having an EGD.     Ms. Judd reports that menarche was at age 9. She had her first child at the age of 19. She had ANGEL-BSO at age 27 and was on HRT for about 30 years. The patient reports a history of 3 benign breast biopsies.          FAMILY HISTORY: At this visit, a 3 generation pedigree was constructed and will be scanned in the patients chart. Ms. Judd has three daughters and they have no history of cancer. The patients brother was diagnosed with colon cancer at the age of 70. The patients sister was diagnosed with breast cancer at the age of 79. The patients father had colon cancer diagnosed at the age of 83. Ms. Owens mother was diagnosed with bladder cancer and  at age 83. The remainder of the family history was either unknown or negative for other cancers. Paternal ancestry is [place]. Maternal ancestry is . Ashkenazi Jew ancestry was denied.       IMPRESSION:  We reviewed the patients medical and family history. We discussed the genetics of cancer and the risks associated with a hereditary predisposition to cancer. The benefits, risks, and limitations of genetic testing were discussed extensively. The family history is not  classic for a specific hereditary cancer predisposition syndrome.  We discussed the possibility of Torres syndrome, also known as hereditary non polyposis colorectal cancer (HNPCC).    HNPCC is an autosomal dominant disorder which predisposes the patient to cancer in the variety of organs. Torres associated cancers include colon (general population 5.5% vs 80% in HNPCC), endometrium (2.7% versus 20-60%), stomach (below 1% versus 11-19%), ovary (1.6% versus 9-12%), hepatobiliary (below 1% versus 2-7%), urinary tract (below 1% versus 4-5%), small bowel (below 1% versus 1-4%) and brain (below 1% versus 1-3%). Torres syndrome is caused by germline mutations in mismatch repair genes (MLH1, MSH2, MSH6, PMS2, EPCAM). In light of the family history of late onset colon cancer, a mutation in PMS2 which confers an estimated 15-20% lifetime risk of colon cancer is a possibility.       [facility] offers a 7 gene Torres/Colorectal High Risk Panel (APC, EPCAM, MLH1, MSH2, MSH6, MUTYH, and PMS2). We reviewed the various risk estimations and emphasized that there is variable expressivity and incomplete penetrance.  We also reviewed that having a mutation in a gene does not mean that the individual will develop the associated types of cancer. It does, however, mean that the chances of developing cancers are significantly increased.  A negative result certainly does not rule out a hereditary predisposition to cancer. We discussed the risk of findings a variant of uncertain significance. The potential insurance implications, limitations and provisions of KHADIJAH were reviewed. If a mutation is found in one these genes, we can offer targeted mutation testing to the patients daughters/ extended family. Ms. Judd expressed understanding and has elected to pursue testing.        RECOMMENDATIONS:   1. Torres/Colorectal High Risk Panel   2. If positive, advise Ms. Judd for appropriate healthcare management.  3. If positive, offer testing to  appropriate relatives   4. Follow-up when results are complete.      REFERENCE:  Addie W, Ila SB. Torres Syndrome. 2004 Feb 5 [Updated 2012 Sep 20]. In: William RA, Bhupinder TD, Antonio CR, et al., editors. GeneReviews [Internet]. [employer] ([place]): [facility]; 1993-. Available from: http://www.ncbi.nlm.nih.gov/books/OVK0124/  Time spent: 60 minutes, more than 50% was spent in counseling. The note is in epic.         Serg Arango M.D.                                                               Ira Shafer MS  Section Head - Medical Genetics                                                  Genetic Counselor           [facility]

## 2017-11-30 ENCOUNTER — TELEPHONE (OUTPATIENT)
Dept: GENETICS | Facility: CLINIC | Age: 72
End: 2017-11-30

## 2017-11-30 NOTE — TELEPHONE ENCOUNTER
Spoke to the patient to disclose negative results from the 7 gene Torres colorectal cancer panel. A copy of the report will be mailed to the home. Ms. Judd denied questions at this time.

## 2017-12-06 ENCOUNTER — LAB VISIT (OUTPATIENT)
Dept: LAB | Facility: HOSPITAL | Age: 72
End: 2017-12-06
Attending: INTERNAL MEDICINE
Payer: MEDICARE

## 2017-12-06 DIAGNOSIS — E78.5 HYPERLIPIDEMIA, UNSPECIFIED HYPERLIPIDEMIA TYPE: ICD-10-CM

## 2017-12-06 LAB
ALBUMIN SERPL BCP-MCNC: 3.5 G/DL
ALP SERPL-CCNC: 56 U/L
ALT SERPL W/O P-5'-P-CCNC: 25 U/L
ANION GAP SERPL CALC-SCNC: 7 MMOL/L
AST SERPL-CCNC: 26 U/L
BILIRUB SERPL-MCNC: 0.8 MG/DL
BUN SERPL-MCNC: 14 MG/DL
CALCIUM SERPL-MCNC: 9.6 MG/DL
CHLORIDE SERPL-SCNC: 108 MMOL/L
CO2 SERPL-SCNC: 24 MMOL/L
CREAT SERPL-MCNC: 1 MG/DL
EST. GFR  (AFRICAN AMERICAN): >60 ML/MIN/1.73 M^2
EST. GFR  (NON AFRICAN AMERICAN): 55.6 ML/MIN/1.73 M^2
GLUCOSE SERPL-MCNC: 145 MG/DL
POTASSIUM SERPL-SCNC: 4.7 MMOL/L
PROT SERPL-MCNC: 7.4 G/DL
SODIUM SERPL-SCNC: 139 MMOL/L
T4 FREE SERPL-MCNC: 1.54 NG/DL
TSH SERPL DL<=0.005 MIU/L-ACNC: 0.17 UIU/ML

## 2017-12-06 PROCEDURE — 80053 COMPREHEN METABOLIC PANEL: CPT

## 2017-12-06 PROCEDURE — 84443 ASSAY THYROID STIM HORMONE: CPT

## 2017-12-06 PROCEDURE — 84439 ASSAY OF FREE THYROXINE: CPT

## 2017-12-06 PROCEDURE — 36415 COLL VENOUS BLD VENIPUNCTURE: CPT | Mod: PO

## 2018-02-20 ENCOUNTER — LAB VISIT (OUTPATIENT)
Dept: LAB | Facility: HOSPITAL | Age: 73
End: 2018-02-20
Attending: INTERNAL MEDICINE
Payer: MEDICARE

## 2018-02-20 DIAGNOSIS — E03.4 HYPOTHYROIDISM DUE TO ACQUIRED ATROPHY OF THYROID: ICD-10-CM

## 2018-02-20 DIAGNOSIS — E11.8 TYPE 2 DIABETES MELLITUS WITH COMPLICATION, WITHOUT LONG-TERM CURRENT USE OF INSULIN: ICD-10-CM

## 2018-02-20 DIAGNOSIS — E78.2 MIXED HYPERLIPIDEMIA: ICD-10-CM

## 2018-02-20 LAB
ALBUMIN SERPL BCP-MCNC: 3.5 G/DL
ALP SERPL-CCNC: 49 U/L
ALT SERPL W/O P-5'-P-CCNC: 29 U/L
ANION GAP SERPL CALC-SCNC: 9 MMOL/L
AST SERPL-CCNC: 31 U/L
BILIRUB SERPL-MCNC: 0.6 MG/DL
BUN SERPL-MCNC: 14 MG/DL
CALCIUM SERPL-MCNC: 9.5 MG/DL
CHLORIDE SERPL-SCNC: 109 MMOL/L
CHOLEST SERPL-MCNC: 136 MG/DL
CHOLEST/HDLC SERPL: 2.6 {RATIO}
CO2 SERPL-SCNC: 24 MMOL/L
CREAT SERPL-MCNC: 0.9 MG/DL
EST. GFR  (AFRICAN AMERICAN): >60 ML/MIN/1.73 M^2
EST. GFR  (NON AFRICAN AMERICAN): >60 ML/MIN/1.73 M^2
ESTIMATED AVG GLUCOSE: 148 MG/DL
GLUCOSE SERPL-MCNC: 182 MG/DL
HBA1C MFR BLD HPLC: 6.8 %
HDLC SERPL-MCNC: 53 MG/DL
HDLC SERPL: 39 %
LDLC SERPL CALC-MCNC: 45 MG/DL
NONHDLC SERPL-MCNC: 83 MG/DL
POTASSIUM SERPL-SCNC: 4.3 MMOL/L
PROT SERPL-MCNC: 7 G/DL
SODIUM SERPL-SCNC: 142 MMOL/L
T4 FREE SERPL-MCNC: 1.22 NG/DL
TRIGL SERPL-MCNC: 190 MG/DL
TSH SERPL DL<=0.005 MIU/L-ACNC: 0.24 UIU/ML

## 2018-02-20 PROCEDURE — 84439 ASSAY OF FREE THYROXINE: CPT

## 2018-02-20 PROCEDURE — 83036 HEMOGLOBIN GLYCOSYLATED A1C: CPT

## 2018-02-20 PROCEDURE — 84443 ASSAY THYROID STIM HORMONE: CPT

## 2018-02-20 PROCEDURE — 36415 COLL VENOUS BLD VENIPUNCTURE: CPT | Mod: PO

## 2018-02-20 PROCEDURE — 80061 LIPID PANEL: CPT

## 2018-02-20 PROCEDURE — 80053 COMPREHEN METABOLIC PANEL: CPT

## 2018-02-22 ENCOUNTER — LAB VISIT (OUTPATIENT)
Dept: LAB | Facility: HOSPITAL | Age: 73
End: 2018-02-22
Attending: INTERNAL MEDICINE
Payer: MEDICARE

## 2018-02-22 DIAGNOSIS — N39.0 URINARY TRACT INFECTION WITHOUT HEMATURIA, SITE UNSPECIFIED: ICD-10-CM

## 2018-02-22 PROCEDURE — 87186 SC STD MICRODIL/AGAR DIL: CPT

## 2018-02-22 PROCEDURE — 87077 CULTURE AEROBIC IDENTIFY: CPT

## 2018-02-22 PROCEDURE — 87088 URINE BACTERIA CULTURE: CPT

## 2018-02-22 PROCEDURE — 87086 URINE CULTURE/COLONY COUNT: CPT

## 2018-02-24 LAB — BACTERIA UR CULT: NORMAL

## 2018-03-12 ENCOUNTER — LAB VISIT (OUTPATIENT)
Dept: LAB | Facility: HOSPITAL | Age: 73
End: 2018-03-12
Attending: INTERNAL MEDICINE
Payer: MEDICARE

## 2018-03-12 DIAGNOSIS — N30.00 ACUTE CYSTITIS WITHOUT HEMATURIA: ICD-10-CM

## 2018-03-12 LAB
BACTERIA #/AREA URNS AUTO: ABNORMAL /HPF
BILIRUB UR QL STRIP: NEGATIVE
CLARITY UR REFRACT.AUTO: ABNORMAL
COLOR UR AUTO: YELLOW
GLUCOSE UR QL STRIP: ABNORMAL
HGB UR QL STRIP: NEGATIVE
KETONES UR QL STRIP: NEGATIVE
LEUKOCYTE ESTERASE UR QL STRIP: ABNORMAL
MICROSCOPIC COMMENT: ABNORMAL
NITRITE UR QL STRIP: NEGATIVE
PH UR STRIP: 5 [PH] (ref 5–8)
PROT UR QL STRIP: NEGATIVE
RBC #/AREA URNS AUTO: 2 /HPF (ref 0–4)
SP GR UR STRIP: 1.01 (ref 1–1.03)
SQUAMOUS #/AREA URNS AUTO: 1 /HPF
URN SPEC COLLECT METH UR: ABNORMAL
UROBILINOGEN UR STRIP-ACNC: NEGATIVE EU/DL
WBC #/AREA URNS AUTO: >100 /HPF (ref 0–5)
WBC CLUMPS UR QL AUTO: ABNORMAL
YEAST UR QL AUTO: ABNORMAL

## 2018-03-12 PROCEDURE — 81001 URINALYSIS AUTO W/SCOPE: CPT

## 2018-03-12 PROCEDURE — 87086 URINE CULTURE/COLONY COUNT: CPT

## 2018-03-12 PROCEDURE — 87088 URINE BACTERIA CULTURE: CPT

## 2018-03-12 PROCEDURE — 87186 SC STD MICRODIL/AGAR DIL: CPT

## 2018-03-12 PROCEDURE — 87077 CULTURE AEROBIC IDENTIFY: CPT

## 2018-03-14 LAB — BACTERIA UR CULT: NORMAL

## 2018-04-17 ENCOUNTER — HOSPITAL ENCOUNTER (INPATIENT)
Facility: HOSPITAL | Age: 73
LOS: 1 days | Discharge: HOME | DRG: 390 | End: 2018-04-18
Attending: EMERGENCY MEDICINE | Admitting: SURGERY
Payer: MEDICARE

## 2018-04-17 DIAGNOSIS — R10.9 ABDOMINAL PAIN: ICD-10-CM

## 2018-04-17 DIAGNOSIS — K56.600 PARTIAL SMALL BOWEL OBSTRUCTION: Primary | ICD-10-CM

## 2018-04-17 LAB
ALBUMIN SERPL BCP-MCNC: 3.2 G/DL
ALP SERPL-CCNC: 53 U/L
ALT SERPL W/O P-5'-P-CCNC: 29 U/L
ANION GAP SERPL CALC-SCNC: 10 MMOL/L
AST SERPL-CCNC: 28 U/L
BACTERIA #/AREA URNS AUTO: NORMAL /HPF
BASOPHILS # BLD AUTO: 0.03 K/UL
BASOPHILS NFR BLD: 0.4 %
BILIRUB SERPL-MCNC: 0.9 MG/DL
BILIRUB UR QL STRIP: NEGATIVE
BUN SERPL-MCNC: 12 MG/DL
BUN SERPL-MCNC: 16 MG/DL (ref 6–30)
CALCIUM SERPL-MCNC: 8.8 MG/DL
CHLORIDE SERPL-SCNC: 106 MMOL/L
CHLORIDE SERPL-SCNC: 106 MMOL/L (ref 95–110)
CLARITY UR REFRACT.AUTO: CLEAR
CO2 SERPL-SCNC: 21 MMOL/L
COLOR UR AUTO: ABNORMAL
CREAT SERPL-MCNC: 0.6 MG/DL (ref 0.5–1.4)
CREAT SERPL-MCNC: 0.8 MG/DL
DIFFERENTIAL METHOD: ABNORMAL
EOSINOPHIL # BLD AUTO: 0.2 K/UL
EOSINOPHIL NFR BLD: 2.2 %
ERYTHROCYTE [DISTWIDTH] IN BLOOD BY AUTOMATED COUNT: 14.1 %
EST. GFR  (AFRICAN AMERICAN): >60 ML/MIN/1.73 M^2
EST. GFR  (NON AFRICAN AMERICAN): >60 ML/MIN/1.73 M^2
GLUCOSE SERPL-MCNC: 228 MG/DL
GLUCOSE SERPL-MCNC: 238 MG/DL (ref 70–110)
GLUCOSE UR QL STRIP: ABNORMAL
HCT VFR BLD AUTO: 34 %
HCT VFR BLD CALC: 35 %PCV (ref 36–54)
HGB BLD-MCNC: 11 G/DL
HGB UR QL STRIP: NEGATIVE
IMM GRANULOCYTES # BLD AUTO: 0.03 K/UL
IMM GRANULOCYTES NFR BLD AUTO: 0.4 %
KETONES UR QL STRIP: NEGATIVE
LACTATE SERPL-SCNC: 1.5 MMOL/L
LACTATE SERPL-SCNC: 2.5 MMOL/L
LEUKOCYTE ESTERASE UR QL STRIP: NEGATIVE
LIPASE SERPL-CCNC: 28 U/L
LYMPHOCYTES # BLD AUTO: 1.6 K/UL
LYMPHOCYTES NFR BLD: 19.3 %
MCH RBC QN AUTO: 28.2 PG
MCHC RBC AUTO-ENTMCNC: 32.4 G/DL
MCV RBC AUTO: 87 FL
MICROSCOPIC COMMENT: NORMAL
MONOCYTES # BLD AUTO: 0.8 K/UL
MONOCYTES NFR BLD: 9.2 %
NEUTROPHILS # BLD AUTO: 5.7 K/UL
NEUTROPHILS NFR BLD: 68.5 %
NITRITE UR QL STRIP: NEGATIVE
NRBC BLD-RTO: 0 /100 WBC
PH UR STRIP: 6 [PH] (ref 5–8)
PLATELET # BLD AUTO: 115 K/UL
PMV BLD AUTO: 11.6 FL
POC IONIZED CALCIUM: 1.07 MMOL/L (ref 1.06–1.42)
POC TCO2 (MEASURED): 25 MMOL/L (ref 23–29)
POCT GLUCOSE: 123 MG/DL (ref 70–110)
POCT GLUCOSE: 131 MG/DL (ref 70–110)
POCT GLUCOSE: 204 MG/DL (ref 70–110)
POTASSIUM BLD-SCNC: 5 MMOL/L (ref 3.5–5.1)
POTASSIUM SERPL-SCNC: 4 MMOL/L
PROT SERPL-MCNC: 6.6 G/DL
PROT UR QL STRIP: NEGATIVE
RBC # BLD AUTO: 3.9 M/UL
RBC #/AREA URNS AUTO: 1 /HPF (ref 0–4)
SAMPLE: ABNORMAL
SODIUM BLD-SCNC: 138 MMOL/L (ref 136–145)
SODIUM SERPL-SCNC: 137 MMOL/L
SP GR UR STRIP: 1.01 (ref 1–1.03)
SQUAMOUS #/AREA URNS AUTO: 0 /HPF
URN SPEC COLLECT METH UR: ABNORMAL
UROBILINOGEN UR STRIP-ACNC: NEGATIVE EU/DL
WBC # BLD AUTO: 8.33 K/UL
WBC #/AREA URNS AUTO: 0 /HPF (ref 0–5)

## 2018-04-17 PROCEDURE — 36415 COLL VENOUS BLD VENIPUNCTURE: CPT

## 2018-04-17 PROCEDURE — 25500020 PHARM REV CODE 255: Performed by: SURGERY

## 2018-04-17 PROCEDURE — 83690 ASSAY OF LIPASE: CPT

## 2018-04-17 PROCEDURE — 99285 EMERGENCY DEPT VISIT HI MDM: CPT | Mod: 25

## 2018-04-17 PROCEDURE — 96374 THER/PROPH/DIAG INJ IV PUSH: CPT

## 2018-04-17 PROCEDURE — G8987 SELF CARE CURRENT STATUS: HCPCS | Mod: CJ

## 2018-04-17 PROCEDURE — 96375 TX/PRO/DX INJ NEW DRUG ADDON: CPT

## 2018-04-17 PROCEDURE — 97165 OT EVAL LOW COMPLEX 30 MIN: CPT

## 2018-04-17 PROCEDURE — 85025 COMPLETE CBC W/AUTO DIFF WBC: CPT

## 2018-04-17 PROCEDURE — G8989 SELF CARE D/C STATUS: HCPCS | Mod: CJ

## 2018-04-17 PROCEDURE — 99284 EMERGENCY DEPT VISIT MOD MDM: CPT | Mod: ,,, | Performed by: EMERGENCY MEDICINE

## 2018-04-17 PROCEDURE — 82962 GLUCOSE BLOOD TEST: CPT

## 2018-04-17 PROCEDURE — 25000003 PHARM REV CODE 250: Performed by: STUDENT IN AN ORGANIZED HEALTH CARE EDUCATION/TRAINING PROGRAM

## 2018-04-17 PROCEDURE — 63600175 PHARM REV CODE 636 W HCPCS: Performed by: EMERGENCY MEDICINE

## 2018-04-17 PROCEDURE — 96372 THER/PROPH/DIAG INJ SC/IM: CPT

## 2018-04-17 PROCEDURE — 25000003 PHARM REV CODE 250: Performed by: EMERGENCY MEDICINE

## 2018-04-17 PROCEDURE — C9113 INJ PANTOPRAZOLE SODIUM, VIA: HCPCS | Performed by: STUDENT IN AN ORGANIZED HEALTH CARE EDUCATION/TRAINING PROGRAM

## 2018-04-17 PROCEDURE — 25500020 PHARM REV CODE 255: Performed by: EMERGENCY MEDICINE

## 2018-04-17 PROCEDURE — 83605 ASSAY OF LACTIC ACID: CPT

## 2018-04-17 PROCEDURE — 63600175 PHARM REV CODE 636 W HCPCS: Performed by: STUDENT IN AN ORGANIZED HEALTH CARE EDUCATION/TRAINING PROGRAM

## 2018-04-17 PROCEDURE — 80053 COMPREHEN METABOLIC PANEL: CPT

## 2018-04-17 PROCEDURE — 81001 URINALYSIS AUTO W/SCOPE: CPT

## 2018-04-17 PROCEDURE — 11000001 HC ACUTE MED/SURG PRIVATE ROOM

## 2018-04-17 PROCEDURE — 83605 ASSAY OF LACTIC ACID: CPT | Mod: 91

## 2018-04-17 PROCEDURE — G8988 SELF CARE GOAL STATUS: HCPCS | Mod: CJ

## 2018-04-17 RX ORDER — SODIUM CHLORIDE 9 MG/ML
INJECTION, SOLUTION INTRAVENOUS CONTINUOUS
Status: DISCONTINUED | OUTPATIENT
Start: 2018-04-17 | End: 2018-04-18

## 2018-04-17 RX ORDER — GLUCAGON 1 MG
1 KIT INJECTION
Status: DISCONTINUED | OUTPATIENT
Start: 2018-04-17 | End: 2018-04-18 | Stop reason: HOSPADM

## 2018-04-17 RX ORDER — DIPHENHYDRAMINE HYDROCHLORIDE 50 MG/ML
25 INJECTION INTRAMUSCULAR; INTRAVENOUS EVERY 4 HOURS PRN
Status: DISCONTINUED | OUTPATIENT
Start: 2018-04-17 | End: 2018-04-18 | Stop reason: HOSPADM

## 2018-04-17 RX ORDER — ENOXAPARIN SODIUM 100 MG/ML
40 INJECTION SUBCUTANEOUS EVERY 24 HOURS
Status: DISCONTINUED | OUTPATIENT
Start: 2018-04-17 | End: 2018-04-18 | Stop reason: HOSPADM

## 2018-04-17 RX ORDER — INSULIN ASPART 100 [IU]/ML
0-5 INJECTION, SOLUTION INTRAVENOUS; SUBCUTANEOUS EVERY 6 HOURS PRN
Status: DISCONTINUED | OUTPATIENT
Start: 2018-04-17 | End: 2018-04-18 | Stop reason: HOSPADM

## 2018-04-17 RX ORDER — MORPHINE SULFATE 4 MG/ML
4 INJECTION, SOLUTION INTRAMUSCULAR; INTRAVENOUS
Status: COMPLETED | OUTPATIENT
Start: 2018-04-17 | End: 2018-04-17

## 2018-04-17 RX ORDER — MORPHINE SULFATE 2 MG/ML
2 INJECTION, SOLUTION INTRAMUSCULAR; INTRAVENOUS EVERY 4 HOURS PRN
Status: DISCONTINUED | OUTPATIENT
Start: 2018-04-17 | End: 2018-04-18 | Stop reason: HOSPADM

## 2018-04-17 RX ORDER — SODIUM CHLORIDE 0.9 % (FLUSH) 0.9 %
3 SYRINGE (ML) INJECTION
Status: DISCONTINUED | OUTPATIENT
Start: 2018-04-17 | End: 2018-04-18 | Stop reason: HOSPADM

## 2018-04-17 RX ORDER — ONDANSETRON 2 MG/ML
4 INJECTION INTRAMUSCULAR; INTRAVENOUS EVERY 12 HOURS PRN
Status: DISCONTINUED | OUTPATIENT
Start: 2018-04-17 | End: 2018-04-17 | Stop reason: RX

## 2018-04-17 RX ORDER — LEVOTHYROXINE SODIUM 88 UG/1
88 TABLET ORAL
Status: DISCONTINUED | OUTPATIENT
Start: 2018-04-17 | End: 2018-04-18 | Stop reason: HOSPADM

## 2018-04-17 RX ADMIN — IOHEXOL 75 ML: 350 INJECTION, SOLUTION INTRAVENOUS at 04:04

## 2018-04-17 RX ADMIN — Medication 2 MG: at 05:04

## 2018-04-17 RX ADMIN — INSULIN ASPART 2 UNITS: 100 INJECTION, SOLUTION INTRAVENOUS; SUBCUTANEOUS at 07:04

## 2018-04-17 RX ADMIN — DEXTROSE 40 MG: 50 INJECTION, SOLUTION INTRAVENOUS at 08:04

## 2018-04-17 RX ADMIN — LEVOTHYROXINE SODIUM 88 MCG: 88 TABLET ORAL at 07:04

## 2018-04-17 RX ADMIN — ENOXAPARIN SODIUM 40 MG: 100 INJECTION SUBCUTANEOUS at 08:04

## 2018-04-17 RX ADMIN — MORPHINE SULFATE 4 MG: 4 INJECTION INTRAVENOUS at 02:04

## 2018-04-17 RX ADMIN — Medication 2 MG: at 12:04

## 2018-04-17 RX ADMIN — SODIUM CHLORIDE 1000 ML: 0.9 INJECTION, SOLUTION INTRAVENOUS at 03:04

## 2018-04-17 RX ADMIN — SODIUM CHLORIDE: 0.9 INJECTION, SOLUTION INTRAVENOUS at 07:04

## 2018-04-17 RX ADMIN — IOHEXOL 200 ML: 350 INJECTION, SOLUTION INTRAVENOUS at 02:04

## 2018-04-17 RX ADMIN — SODIUM CHLORIDE 1000 ML: 0.9 INJECTION, SOLUTION INTRAVENOUS at 12:04

## 2018-04-17 NOTE — HPI
Ms Judd is a 72 yo F with PMH of DM and 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 Ward with extensive TIFFANY (>2h) and multiple enterotomies who presents today with repeat pSBO.     Patient states that since last year she has been dealing with intermittent abdominal pain due to her scar tissue for which she has been seeing Dr. Erazo and for the past weeks she has been having more liquid stools with decreased amount of quantity until two days ago when she started having increased crampy epigastric abdominal pain. Last BM was this morning, but has not passed any gas for the past days. Has some nausea but no emesis.     CT abdomen shows pSBO. Takes daily Fibercon for constipation.

## 2018-04-17 NOTE — CONSULTS
[facility]  General Surgery  Consult Note    Patient Name: Anayeli Judd  MRN: [id number]  Code Status: Full Code  Admission Date: 4/17/2018  Hospital Length of Stay: 0 days  Attending Physician: Guerrero Reynolds MD  Primary Care Provider: Rocio Mc MD    Patient information was obtained from patient, past medical records and ER records.     Inpatient consult to General surgery  Consult performed by: DONALDO DU  Consult ordered by: DONALDO DU        Subjective:     Principal Problem: <principal problem not specified>    History of Present Illness: Ms Judd is a 72 yo F with PMH of DM and 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 Ward with extensive TIFFANY (>2h) and multiple enterotomies who presents today with repeat pSBO.     Patient states that since last year she has been dealing with intermittent abdominal pain due to her scar tissue for which she has been seeing Dr. Erazo and for the past weeks she has been having more liquid stools with decreased amount of quantity until two days ago when she started having increased crampy epigastric abdominal pain. Last BM was this morning, but has not passed any gas for the past days. Has some nausea but no emesis.     CT abdomen shows pSBO. Takes daily Fibercon for constipation.     No current facility-administered medications on file prior to encounter.      Current Outpatient Prescriptions on File Prior to Encounter   Medication Sig    atorvastatin (LIPITOR) 40 MG tablet Take 1 tablet by mouth once daily.    azelastine (ASTELIN) 137 mcg nasal spray 1 spray (137 mcg total) by Nasal route 2 (two) times daily.    biotin 5,000 mcg TbDL Take by mouth.    blood sugar diagnostic Strp Dispense madie contour strips; test blood sugar once daily    blood-glucose meter (CONTOUR METER) kit Please dispense Madie  Contour meter and supplies Use as instructed Test Blood sugar once daily    cyanocobalamin 1,000 mcg/mL injection 1,000 mcg.    diphenhydrAMINE (BENADRYL) 25 mg capsule Take 1 each (25 mg total) by mouth nightly as needed for Itching, Allergies or Insomnia.    DUREZOL 0.05 % Drop ophthalmic solution instill 1 to 2 drops INTO SURGERY EYE twice a day STARTING AFTER SURGERY    fluticasone (FLONASE) 50 mcg/actuation nasal spray 2 sprays by Each Nare route once daily.    gabapentin (NEURONTIN) 600 MG tablet Take 600 mg by mouth 2 (two) times daily.    hydrocortisone 2.5 % cream     ipratropium (ATROVENT) 0.06 % nasal spray     lancets (ONE TOUCH ULTRASOFT LANCETS) Misc Test blood sugar once daily    levothyroxine (SYNTHROID) 88 MCG tablet Take 1 tablet (88 mcg total) by mouth before breakfast. Brand only synthroid    magnesium 30 mg Tab Take 500 capsules by mouth. Patient's taking magnesium 500mg QD    meloxicam (MOBIC) 15 MG tablet Take 1 tablet (15 mg total) by mouth daily as needed for Pain. With food for arthritis    metformin (GLUCOPHAGE-XR) 750 MG 24 hr tablet Take 1 tablet (750 mg total) by mouth 2 (two) times daily with meals.    metronidazole 0.75% (METROCREAM) 0.75 % Crea     nitrofurantoin, macrocrystal-monohydrate, (MACROBID) 100 MG capsule Take 1 capsule (100 mg total) by mouth 2 (two) times daily.    ondansetron (ZOFRAN) 8 MG tablet Take 1 tablet (8 mg total) by mouth every 8 (eight) hours as needed for Nausea.    ospemifene (OSPHENA) 60 mg Tab Take 60 mg by mouth once daily.    pantoprazole (PROTONIX) 40 MG tablet Take 1 tablet (40 mg total) by mouth once daily.    PROLENSA 0.07 % Drop     VIT A/VIT C/VIT E/ZINC/COPPER (ICAPS AREDS ORAL) Take by mouth 2 (two) times daily.    vitamin D 1000 units Tab Take 185 mg by mouth once daily. D3    [DISCONTINUED] ciprofloxacin HCl (CILOXAN) 0.3 % ophthalmic solution instill 1 drop into both eyes four times a day STARTING THE DAY BEFORE SURGERY     [DISCONTINUED] doxycycline (VIBRA-TABS) 100 MG tablet Take 1 tablet (100 mg total) by mouth 2 (two) times daily.       Review of patient's allergies indicates:   Allergen Reactions    Percocet  [oxycodone-acetaminophen]      Other reaction(s): Itching    Sulfa (sulfonamide antibiotics)      Other reaction(s): Nausea  Other reaction(s): Itching    Adhesive Rash       Past Medical History:   Diagnosis Date    Allergic rhinitis     Arthritis     Blood transfusion     during delivery and     Bowel obstruction     Cervical radiculopathy     followed by dr cloud    Colon cancer     transverse colon; resected; Stage IIA (pT3 pN0 MX)    Diarrhea     Family history of breast cancer     Family history of colon cancer     Fatty liver     GERD (gastroesophageal reflux disease)     History of shingles     Hyperlipidemia     Hypothyroidism     Irritable bowel syndrome     Microscopic colitis     treated     Raynaud phenomenon     Raynaud's disease     Type 2 diabetes mellitus      Past Surgical History:   Procedure Laterality Date    APPENDECTOMY      BACK SURGERY      CARPAL TUNNEL RELEASE      bilateral      SECTION      CHOLECYSTECTOMY  1965    COLECTOMY  2011    Transverse colon resection by Dr. Aguirre    COLONOSCOPY N/A 2017    Procedure: COLONOSCOPY;  Surgeon: Manjit Alvarez MD;  Location: [facility] ([address] [place] [address]);  Service: Endoscopy;  Laterality: N/A;    EYE SURGERY      Cataract Removal    HYSTERECTOMY      posterolateral fusion with autograft bone and [place] mineralized bone matrix  13    at [facility] for lumbar spine stenosis    TOE SURGERY      TONSILLECTOMY      TRIGGER FINGER RELEASE       Family History     Problem Relation (Age of Onset)    Alcohol abuse Brother, Brother    Arthritis Daughter, Brother    Asthma Daughter    Bladder Cancer Mother    Breast cancer Sister (79)    Cataracts Mother    Colon cancer Father, Brother (70)    Depression  Daughter, Daughter    Glaucoma Mother    Heart disease Father (50), Mother    Hyperlipidemia Mother    Hypertension Daughter    Kidney disease Mother    Parkinsonism Brother    Stroke Daughter (40)        Social History Main Topics    Smoking status: Never Smoker    Smokeless tobacco: Never Used    Alcohol use 3.0 oz/week     5 Glasses of wine per week      Comment: socially    Drug use: No    Sexual activity: No     Review of Systems     Negative except above    Objective:     Vital Signs (Most Recent):  Temp: 97.6 °F (36.4 °C) (04/17/18 0124)  Pulse: 77 (04/17/18 0533)  Resp: 18 (04/17/18 0124)  BP: (!) 124/58 (04/17/18 0551)  SpO2: 98 % (04/17/18 0533) Vital Signs (24h Range):  Temp:  [97.6 °F (36.4 °C)] 97.6 °F (36.4 °C)  Pulse:  [74-80] 77  Resp:  [18] 18  SpO2:  [96 %-100 %] 98 %  BP: (106-159)/(51-79) 124/58     Weight: 68 kg (150 lb)  Body mass index is 27.44 kg/m².    Physical Exam     General: In no acute distress, well appearance.   CV: RRR, S1, S2 no murmur or gallops   Pulm: non labored breathing, b/l clear breath sounds.   Abd: soft, mildly distended, some midabdomen and inferior abdominal pain. Can feel moderately sized mass at the mid inferior abdomen corresponding with dilated small bowel with fecalization. Otherwise no rebound or guarding. Midline incision scar well healed from xyphoid to pubis. RUQ open cholecystectomy incision well healed.   : No kam in place. Patient urinated after CT scan.   Ext: wwp    Significant Labs:  CBC:   Recent Labs  Lab 04/17/18 0229 04/17/18 0311   WBC 8.33  --    RBC 3.90*  --    HGB 11.0*  --    HCT 34.0* 35*   *  --    MCV 87  --    MCH 28.2  --    MCHC 32.4  --      BMP:   Recent Labs  Lab 04/17/18 0229   *      K 4.0      CO2 21*   BUN 12   CREATININE 0.8   CALCIUM 8.8       Significant Diagnostics:    Findings suggestive of partial small bowel obstruction with transition point adjacent to the left anterior abdominal wall,  likely related to adhesions.    Hepatic steatosis.    Multiple additional stable findings as described above.    This report was flagged in Epic as abnormal.    Assessment/Plan:     Partial small bowel obstruction    Ms Judd is a 72 yo F with PMH of DM and 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 Ward with extensive TIFFANY (>2h) and multiple enterotomies who presents today with repeat pSBO.     - Admit to General Surgery under Dr. Fletcher.   - Will try conservative management with bowel rest for now, no need for NGT as there is no gastric distension in CT scan and patient hasn't had any emesis and just mildly nauseous.   - Restart home Synthroid.   - ISSS, hold Metformin for now.   - Please call with questions, thank you.     Abril Coelho MD  General Surgery PGY IV  Beeper: [phone]

## 2018-04-17 NOTE — NURSING
Pt arrived to floor. VSS. Iv intact and infusing. No skin break down noted. POC reviewed with pt. No complaints at this time. SCD on. IS at  Bedside. Bed in low position. Call light in reach. Will continue to monitor.

## 2018-04-17 NOTE — SUBJECTIVE & OBJECTIVE
No current facility-administered medications on file prior to encounter.      Current Outpatient Prescriptions on File Prior to Encounter   Medication Sig    atorvastatin (LIPITOR) 40 MG tablet Take 1 tablet by mouth once daily.    azelastine (ASTELIN) 137 mcg nasal spray 1 spray (137 mcg total) by Nasal route 2 (two) times daily.    biotin 5,000 mcg TbDL Take by mouth.    blood sugar diagnostic Strp Dispense SHADOW contour strips; test blood sugar once daily    blood-glucose meter (CONTOUR METER) kit Please dispense Madie Contour meter and supplies Use as instructed Test Blood sugar once daily    cyanocobalamin 1,000 mcg/mL injection 1,000 mcg.    diphenhydrAMINE (BENADRYL) 25 mg capsule Take 1 each (25 mg total) by mouth nightly as needed for Itching, Allergies or Insomnia.    DUREZOL 0.05 % Drop ophthalmic solution instill 1 to 2 drops INTO SURGERY EYE twice a day STARTING AFTER SURGERY    fluticasone (FLONASE) 50 mcg/actuation nasal spray 2 sprays by Each Nare route once daily.    gabapentin (NEURONTIN) 600 MG tablet Take 600 mg by mouth 2 (two) times daily.    hydrocortisone 2.5 % cream     ipratropium (ATROVENT) 0.06 % nasal spray     lancets (ONE TOUCH ULTRASOFT LANCETS) Misc Test blood sugar once daily    levothyroxine (SYNTHROID) 88 MCG tablet Take 1 tablet (88 mcg total) by mouth before breakfast. Brand only synthroid    magnesium 30 mg Tab Take 500 capsules by mouth. Patient's taking magnesium 500mg QD    meloxicam (MOBIC) 15 MG tablet Take 1 tablet (15 mg total) by mouth daily as needed for Pain. With food for arthritis    metformin (GLUCOPHAGE-XR) 750 MG 24 hr tablet Take 1 tablet (750 mg total) by mouth 2 (two) times daily with meals.    metronidazole 0.75% (METROCREAM) 0.75 % Crea     nitrofurantoin, macrocrystal-monohydrate, (MACROBID) 100 MG capsule Take 1 capsule (100 mg total) by mouth 2 (two) times daily.    ondansetron (ZOFRAN) 8 MG tablet Take 1 tablet (8 mg total) by mouth  every 8 (eight) hours as needed for Nausea.    ospemifene (OSPHENA) 60 mg Tab Take 60 mg by mouth once daily.    pantoprazole (PROTONIX) 40 MG tablet Take 1 tablet (40 mg total) by mouth once daily.    PROLENSA 0.07 % Drop     VIT A/VIT C/VIT E/ZINC/COPPER (ICAPS AREDS ORAL) Take by mouth 2 (two) times daily.    vitamin D 1000 units Tab Take 185 mg by mouth once daily. D3    [DISCONTINUED] ciprofloxacin HCl (CILOXAN) 0.3 % ophthalmic solution instill 1 drop into both eyes four times a day STARTING THE DAY BEFORE SURGERY    [DISCONTINUED] doxycycline (VIBRA-TABS) 100 MG tablet Take 1 tablet (100 mg total) by mouth 2 (two) times daily.       Review of patient's allergies indicates:   Allergen Reactions    Percocet  [oxycodone-acetaminophen]      Other reaction(s): Itching    Sulfa (sulfonamide antibiotics)      Other reaction(s): Nausea  Other reaction(s): Itching    Adhesive Rash       Past Medical History:   Diagnosis Date    Allergic rhinitis     Arthritis     Blood transfusion     during delivery and     Bowel obstruction     Cervical radiculopathy     followed by dr cloud    Colon cancer     transverse colon; resected; Stage IIA (pT3 pN0 MX)    Diarrhea     Family history of breast cancer     Family history of colon cancer     Fatty liver     GERD (gastroesophageal reflux disease)     History of shingles     Hyperlipidemia     Hypothyroidism     Irritable bowel syndrome     Microscopic colitis     treated     Raynaud phenomenon     Raynaud's disease     Type 2 diabetes mellitus      Past Surgical History:   Procedure Laterality Date    APPENDECTOMY      BACK SURGERY      CARPAL TUNNEL RELEASE      bilateral      SECTION      CHOLECYSTECTOMY  1965    COLECTOMY  2011    Transverse colon resection by Dr. Aguirre    COLONOSCOPY N/A 2017    Procedure: COLONOSCOPY;  Surgeon: Manjit Alvarez MD;  Location: [facility] ([address] [place] [address]);  Service: Endoscopy;   Laterality: N/A;    EYE SURGERY      Cataract Removal    HYSTERECTOMY      posterolateral fusion with autograft bone and [place] mineralized bone matrix  2/1/13    at [facility] for lumbar spine stenosis    TOE SURGERY      TONSILLECTOMY      TRIGGER FINGER RELEASE       Family History     Problem Relation (Age of Onset)    Alcohol abuse Brother, Brother    Arthritis Daughter, Brother    Asthma Daughter    Bladder Cancer Mother    Breast cancer Sister (79)    Cataracts Mother    Colon cancer Father, Brother (70)    Depression Daughter, Daughter    Glaucoma Mother    Heart disease Father (50), Mother    Hyperlipidemia Mother    Hypertension Daughter    Kidney disease Mother    Parkinsonism Brother    Stroke Daughter (40)        Social History Main Topics    Smoking status: Never Smoker    Smokeless tobacco: Never Used    Alcohol use 3.0 oz/week     5 Glasses of wine per week      Comment: socially    Drug use: No    Sexual activity: No     Review of Systems     Negative except above    Objective:     Vital Signs (Most Recent):  Temp: 97.6 °F (36.4 °C) (04/17/18 0124)  Pulse: 77 (04/17/18 0533)  Resp: 18 (04/17/18 0124)  BP: (!) 124/58 (04/17/18 0551)  SpO2: 98 % (04/17/18 0533) Vital Signs (24h Range):  Temp:  [97.6 °F (36.4 °C)] 97.6 °F (36.4 °C)  Pulse:  [74-80] 77  Resp:  [18] 18  SpO2:  [96 %-100 %] 98 %  BP: (106-159)/(51-79) 124/58     Weight: 68 kg (150 lb)  Body mass index is 27.44 kg/m².    Physical Exam     General: In no acute distress, well appearance.   CV: RRR, S1, S2 no murmur or gallops   Pulm: non labored breathing, b/l clear breath sounds.   Abd: soft, mildly distended, some midabdomen and inferior abdominal pain. Can feel moderately sized mass at the mid inferior abdomen corresponding with dilated small bowel with fecalization. Otherwise no rebound or guarding. Midline incision scar well healed from xyphoid to pubis. RUQ open cholecystectomy incision well healed.   : No kam in place.  Patient urinated after CT scan.   Ext: wwp    Significant Labs:  CBC:   Recent Labs  Lab 04/17/18 0229 04/17/18 0311   WBC 8.33  --    RBC 3.90*  --    HGB 11.0*  --    HCT 34.0* 35*   *  --    MCV 87  --    MCH 28.2  --    MCHC 32.4  --      BMP:   Recent Labs  Lab 04/17/18 0229   *      K 4.0      CO2 21*   BUN 12   CREATININE 0.8   CALCIUM 8.8       Significant Diagnostics:    Findings suggestive of partial small bowel obstruction with transition point adjacent to the left anterior abdominal wall, likely related to adhesions.    Hepatic steatosis.    Multiple additional stable findings as described above.    This report was flagged in Epic as abnormal.

## 2018-04-17 NOTE — PLAN OF CARE
Patient lives alone in a 7th floor condominium w/elevator access. Daughter, Margareth, at . Not medically stable for discharge;Conservative management for SBO:NPO/IVF. Currently: no needs determined.     Declined [facility];Received one w/previous admit.        04/17/18 1345   Discharge Assessment   Assessment Type Discharge Planning Assessment   Confirmed/corrected address and phone number on facesheet? Yes   Assessment information obtained from? Patient;Medical Record   Expected Length of Stay (days) (TBD/? 3+ days)   Communicated expected length of stay with patient/caregiver no  (Per MD)   Prior to hospitilization cognitive status: Alert/Oriented;No Deficits   Prior to hospitalization functional status: Independent   Current cognitive status: Alert/Oriented;No Deficits   Current Functional Status: Independent;Needs Assistance   Facility Arrived From: (N/A)   Lives With alone   Able to Return to Prior Arrangements yes   Is patient able to care for self after discharge? Yes   Who are your caregiver(s) and their phone number(s)? (3 daughters: 1. Kimberly  [phone], 2. Danielle  [phone], 3. Margareht no # given)   Patient's perception of discharge disposition home or selfcare   Readmission Within The Last 30 Days no previous admission in last 30 days   Patient currently being followed by outpatient case management? No   Patient currently receives any other outside agency services? No   Equipment Currently Used at Home none   Do you have any problems affording any of your prescribed medications? No   Is the patient taking medications as prescribed? yes   Does the patient have transportation home? Yes   Transportation Available car;family or friend will provide   Dialysis Name and Scheduled days (N/A)   Does the patient receive services at the [facility]? No   Discharge Plan A Home  (Daughters will assist her as needed. )   Discharge Plan B Home  (as above)   Patient/Family In Agreement With  Plan yes

## 2018-04-17 NOTE — PT/OT/SLP EVAL
Occupational Therapy   Evaluation and Discharge Note    Name: Anayeli Judd  MRN: [id number]  Admitting Diagnosis:  <principal problem not specified>      Recommendations:     Discharge Recommendations: home (no OT needs)  Discharge Equipment Recommendations:  none  Barriers to discharge:  None    History:     Occupational Profile:  Living Environment: Pt lives alone in 9th floor condo with elevator access. Home has tub with 2STE and walk-in shower with shower chair and grab bars  Previous level of function: PTA, pt reports independence with all ADL and IADL. Pt states she has hired a cleaning service to do most of the cleaning, however she completes laundry and ironing tasks. Pt drives and states she is able to go to the grocery store and appointments independently. Pt states she was only using the RW after her last sx, but was not using it for ambulation immediately prior to this admission.  Roles and Routines: mother, grandmother  Equipment Owned:  grab bar, shower chair, walker, rolling, raised toilet (does not use RW)  Assistance upon Discharge: Daughter to assist as needed following d/c     Past Medical History:   Diagnosis Date    Allergic rhinitis     Arthritis     Blood transfusion     during delivery and     Bowel obstruction     Cervical radiculopathy     followed by dr cloud    Colon cancer     transverse colon; resected; Stage IIA (pT3 pN0 MX)    Diarrhea     Family history of breast cancer     Family history of colon cancer     Fatty liver     GERD (gastroesophageal reflux disease)     History of shingles     Hyperlipidemia     Hypothyroidism     Irritable bowel syndrome     Microscopic colitis     treated     Raynaud phenomenon     Raynaud's disease     Type 2 diabetes mellitus        Past Surgical History:   Procedure Laterality Date    APPENDECTOMY      BACK SURGERY      CARPAL TUNNEL RELEASE      bilateral      SECTION      CHOLECYSTECTOMY       COLECTOMY  06/27/2011    Transverse colon resection by Dr. Aguirre    COLONOSCOPY N/A 7/27/2017    Procedure: COLONOSCOPY;  Surgeon: Manjit Alvarez MD;  Location: [facility] ([address] [place] [address]);  Service: Endoscopy;  Laterality: N/A;    EYE SURGERY      Cataract Removal    HYSTERECTOMY      posterolateral fusion with autograft bone and [place] mineralized bone matrix  2/1/13    at [facility] for lumbar spine stenosis    TOE SURGERY      TONSILLECTOMY      TRIGGER FINGER RELEASE         Subjective     Chief Complaint: pt states pain is usually bad, however just received pain medication   Patient/Family stated goals: none  Communicated with: RN prior to session.  Pain/Comfort:  · Pain Rating 1: 0/10  · Location 1: abdomen  · Pain Addressed 1: Reposition, Distraction, Pre-medicate for activity  · Pain Rating Post-Intervention 1: 0/10    Patients cultural, spiritual, Orthodox conflicts given the current situation: none reported     Objective:     Patient found with: peripheral IV, SCD, telemetry    General Precautions: Standard, fall   Orthopedic Precautions:N/A   Braces: N/A     Occupational Performance:    Bed Mobility:    · Patient completed Rolling/Turning to Left with  supervision, with side rail and elevated HOB  · Patient completed Scooting/Bridging with supervision  · Patient completed Supine to Sit with supervision, with side rail and elevated HOB  · Patient completed Sit to Supine with supervision, with side rail and elevated HOB    Functional Mobility/Transfers:  · Patient completed Sit <> Stand Transfer with supervision  with  no assistive device   · Functional Mobility: Pt ambulate 300 ft with supervision using no AD    Activities of Daily Living:  · UB Dressing: setup assistance to don robe in standing  · LB Dressing: supervision to manage B socks while seated EOB; maximum assistance to don slippers while seated EOB but pt able to independently doff    Cognitive/Visual Perceptual:  Cognitive/Psychosocial  "Skills:     -       Oriented to: Person, Place, Time and Situation   -       Follows Commands/attention:Follows multistep  commands  -       Communication: clear/fluent  -       Memory: No Deficits noted  -       Safety awareness/insight to disability: intact   -       Mood/Affect/Coping skills/emotional control: Appropriate to situation and Pleasant  Visual/Perceptual:      -Pt reports use of B hearing aids and hx of cataracts sx    Physical Exam:  Balance:    -       good sitting balance; good static/dynamic standing balance  Postural examination/scapula alignment:    -       No postural abnormalities identified  Skin integrity: Visible skin intact  Edema:  None noted  Sensation:    -       Intact  Dominant hand:    -       R hand  Upper Extremity Range of Motion:     -       Right Upper Extremity: WFL  -       Left Upper Extremity: WFL  Upper Extremity Strength:    -       Right Upper Extremity: WFL  -       Left Upper Extremity: WFL   Strength:    -       Right Upper Extremity: WFL  -       Left Upper Extremity: WFL  Fine Motor Coordination:    -       Intact  Gross motor coordination:   WFL    Patient left supine with all lines intact, call button in reach and RN, daughter present    [facility] 6 Click:  [facility] Total Score: 22    Treatment & Education:  Pt educated on role of OT/POC  Pt educated on possible OT re-consult if MD chooses sx intervention  White board/communication board updated  Education:    Assessment:     Anayeli Judd is a 73 y.o. female with a medical diagnosis of <principal problem not specified>. At this time, patient is functioning at their prior level of function and does not require further acute OT services.     Clinical Decision Makin.  OT Low:  "Pt evaluation falls under low complexity for evaluation coding due to performance deficits noted in 1-3 areas as stated above and 0 co-morbities affecting current functional status. Data obtained from problem focused assessments. No " "modifications or assistance was required for completion of evaluation. Only brief occupational profile and history review completed."     Plan:     During this hospitalization, patient does not require further acute OT services.  Please re-consult if situation changes.    · Plan of Care Reviewed with: patient, daughter    This Plan of care has been discussed with the patient who was involved in its development and understands and is in agreement with the identified goals and treatment plan    GOALS:    Occupational Therapy Goals     Not on file          Multidisciplinary Problems (Resolved)        Problem: Occupational Therapy Goal    Goal Priority Disciplines Outcome Interventions   Occupational Therapy Goal   (Resolved)     OT, PT/OT Outcome(s) achieved                    Time Tracking:     OT Date of Treatment: 04/17/18  OT Start Time: 1405  OT Stop Time: 1421  OT Total Time (min): 16 min    Billable Minutes:Evaluation 16    Dilia Murrell OT  4/17/2018    "

## 2018-04-17 NOTE — ED TRIAGE NOTES
Pt with history of SBO. Pt states that at around 4 PM today she doubled over with abdominal pain. Pt describes as sharp pain and rates as 10 out of 10. Pt states she is nauseous but no vomiting. Pt states she has several BM a day but that it is mostly water with loose stool. Pt with BM earlier in the day. Pt c/o LUQ and RUq pain.

## 2018-04-17 NOTE — ED PROVIDER NOTES
Encounter Date: 2018       History     Chief Complaint   Patient presents with    Abdominal Pain     Pt states that she started to experience sharp abdominal pain this evening. Pt with hx of blockage.      HPI     73-year-old female with history of multiple small bowel obstructions presents for evaluation of acute onset upper abdominal pain.  Patient reports pain began at approximately 4 PM yesterday afternoon and has persisted since with intermittent episodes of worsening.  Patient can identify no aggravating or relieving factors.  And there is no radiation of the pain.  Patient doesn't endorse nausea but no vomiting.  Patient has had no bowel movement since 3 PM yesterday (patient reports she normally has up to 20 bowel movements per day) and denies any passage of flatus since then either.      Review of patient's allergies indicates:   Allergen Reactions    Percocet  [oxycodone-acetaminophen]      Other reaction(s): Itching    Sulfa (sulfonamide antibiotics)      Other reaction(s): Nausea  Other reaction(s): Itching    Adhesive Rash     Past Medical History:   Diagnosis Date    Allergic rhinitis     Arthritis     Blood transfusion     during delivery and     Bowel obstruction     Cervical radiculopathy     followed by dr cloud    Colon cancer     transverse colon; resected; Stage IIA (pT3 pN0 MX)    Diarrhea     Family history of breast cancer     Family history of colon cancer     Fatty liver     GERD (gastroesophageal reflux disease)     History of shingles     Hyperlipidemia     Hypothyroidism     Irritable bowel syndrome     Microscopic colitis     treated 2013    Raynaud phenomenon     Raynaud's disease     Type 2 diabetes mellitus      Past Surgical History:   Procedure Laterality Date    APPENDECTOMY      BACK SURGERY      CARPAL TUNNEL RELEASE      bilateral      SECTION      CHOLECYSTECTOMY  1965    COLECTOMY  2011    Transverse colon  resection by Dr. Aguirre    COLONOSCOPY N/A 7/27/2017    Procedure: COLONOSCOPY;  Surgeon: Manjit Alvarez MD;  Location: [facility] ([address] [place] [address]);  Service: Endoscopy;  Laterality: N/A;    EYE SURGERY      Cataract Removal    HYSTERECTOMY      posterolateral fusion with autograft bone and [place] mineralized bone matrix  2/1/13    at [facility] for lumbar spine stenosis    TOE SURGERY      TONSILLECTOMY      TRIGGER FINGER RELEASE       Family History   Problem Relation Age of Onset    Heart disease Father 50     Mi age 50    Colon cancer Father     Bladder Cancer Mother      non smoker    Cataracts Mother     Glaucoma Mother     Heart disease Mother     Hyperlipidemia Mother     Kidney disease Mother     Breast cancer Sister 79    Arthritis Daughter     Asthma Daughter     Depression Daughter     Hypertension Daughter     Stroke Daughter 40    Arthritis Brother     Colon cancer Brother 70    Alcohol abuse Brother     Parkinsonism Brother     Alcohol abuse Brother     Depression Daughter     Celiac disease Neg Hx     Cirrhosis Neg Hx     Colon polyps Neg Hx     Crohn's disease Neg Hx     Cystic fibrosis Neg Hx     Esophageal cancer Neg Hx     Hemochromatosis Neg Hx     Inflammatory bowel disease Neg Hx     Irritable bowel syndrome Neg Hx     Liver cancer Neg Hx     Liver disease Neg Hx     Rectal cancer Neg Hx     Stomach cancer Neg Hx     Ulcerative colitis Neg Hx     Eliazar's disease Neg Hx     Amblyopia Neg Hx     Blindness Neg Hx     Macular degeneration Neg Hx     Retinal detachment Neg Hx     Strabismus Neg Hx     Melanoma Neg Hx      Social History   Substance Use Topics    Smoking status: Never Smoker    Smokeless tobacco: Never Used    Alcohol use 3.0 oz/week     5 Glasses of wine per week      Comment: socially     Review of Systems   Constitutional: Negative for chills and fever.   HENT: Negative for sore throat and voice change.    Eyes: Negative for pain and visual  disturbance.   Respiratory: Negative for cough and shortness of breath.    Cardiovascular: Negative for chest pain and palpitations.   Gastrointestinal: Positive for abdominal pain and nausea. Negative for vomiting.   Genitourinary: Negative for dysuria and flank pain.   Musculoskeletal: Negative for back pain and neck pain.   Skin: Negative for rash and wound.   Neurological: Negative for weakness and headaches.       Physical Exam     Initial Vitals [04/17/18 0124]   BP Pulse Resp Temp SpO2   (!) 159/66 80 18 97.6 °F (36.4 °C) 99 %      MAP       97         Physical Exam    Vitals reviewed.  Constitutional: She appears well-developed and well-nourished. She is not diaphoretic. No distress.   HENT:   Head: Normocephalic and atraumatic.   Eyes: Conjunctivae are normal. No scleral icterus.   Neck: Normal range of motion. Neck supple.   Cardiovascular: Normal rate, regular rhythm, normal heart sounds and intact distal pulses.   No murmur heard.  Pulmonary/Chest: Breath sounds normal. No respiratory distress.   Abdominal: She exhibits no distension. There is tenderness. There is no rebound and no guarding.   Final abdomen with normal bowel sounds.  Diffusely tender to palpation with worse tenderness in the upper abdomen as well as the right lower quadrant.   Musculoskeletal: She exhibits no edema or tenderness.   Neurological: She is alert and oriented to person, place, and time.   Skin: Skin is warm and dry.         ED Course   Procedures  Labs Reviewed - No data to display         HOII MDM    DDx includes but is not limited to: Obstruction, ileus, gastroenteritis, biliary disease, pancreatitis  A/P:  Pt is a 73-year-old female with abdominal pain.  Patient signs and symptoms are concerning for possible bowel obstruction versus ileus.  We'll perform full belly labs as well as x-ray and CT.     Dispo pending , labs/imaging and reassessment.  Case Discussed with Dr. Rodolfo RGII  04/17/2018 3:52  AM        Attending Note:  Physician Attestation Statement: I have personally seen and examined this patient. As the supervising MD I agree with the above history. As the supervising MD I agree with the above PE. As the supervising MD I agree with the above treatment, course, plan, and disposition.     Noted to have elevated lactate, CT shows partial SBO.  Seen by surgery for partial SBO, will admit for further care.                          Clinical Impression:   The encounter diagnosis was Abdominal pain.                           Guerrero Reynolds MD  04/17/18 0571

## 2018-04-17 NOTE — ASSESSMENT & PLAN NOTE
Ms Judd is a 74 yo F with PMH of DM and 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 Ward with extensive TIFFANY (>2h) and multiple enterotomies who presents today with repeat pSBO.     - Admit to General Surgery under Dr. Fletcher.   - Will try conservative management with bowel rest for now, no need for NGT as there is no gastric distension in CT scan and patient hasn't had any emesis and just mildly nauseous.   - Restart home Synthroid.   - ISSS, hold Metformin for now.   - Please call with questions, thank you.     Abril Coelho MD  General Surgery PGY IV  Beeper: [phone]

## 2018-04-17 NOTE — PLAN OF CARE
Problem: Occupational Therapy Goal  Goal: Occupational Therapy Goal  Outcome: Outcome(s) achieved Date Met: 04/17/18  Eval completed; no OT needs at this time

## 2018-04-17 NOTE — PLAN OF CARE
Problem: Patient Care Overview  Goal: Plan of Care Review  Outcome: Ongoing (interventions implemented as appropriate)  Pt AO x4. VSS. POC reviewed with pt and daughter.  No skin break down noted. Pain controlled with prn medication. No falls noted this shift. Bed in low position. Call light in reach. Will continue to monitor.

## 2018-04-18 VITALS
SYSTOLIC BLOOD PRESSURE: 124 MMHG | WEIGHT: 150 LBS | DIASTOLIC BLOOD PRESSURE: 63 MMHG | RESPIRATION RATE: 18 BRPM | OXYGEN SATURATION: 97 % | TEMPERATURE: 97 F | BODY MASS INDEX: 27.6 KG/M2 | HEART RATE: 66 BPM | HEIGHT: 62 IN

## 2018-04-18 LAB
ANION GAP SERPL CALC-SCNC: 8 MMOL/L
BASOPHILS # BLD AUTO: 0.02 K/UL
BASOPHILS NFR BLD: 0.5 %
BUN SERPL-MCNC: 9 MG/DL
CALCIUM SERPL-MCNC: 8.2 MG/DL
CHLORIDE SERPL-SCNC: 110 MMOL/L
CO2 SERPL-SCNC: 21 MMOL/L
CREAT SERPL-MCNC: 0.7 MG/DL
DIFFERENTIAL METHOD: ABNORMAL
EOSINOPHIL # BLD AUTO: 0.1 K/UL
EOSINOPHIL NFR BLD: 3.1 %
ERYTHROCYTE [DISTWIDTH] IN BLOOD BY AUTOMATED COUNT: 14.4 %
EST. GFR  (AFRICAN AMERICAN): >60 ML/MIN/1.73 M^2
EST. GFR  (NON AFRICAN AMERICAN): >60 ML/MIN/1.73 M^2
GLUCOSE SERPL-MCNC: 142 MG/DL
HCT VFR BLD AUTO: 30.7 %
HGB BLD-MCNC: 9.7 G/DL
IMM GRANULOCYTES # BLD AUTO: 0.01 K/UL
IMM GRANULOCYTES NFR BLD AUTO: 0.2 %
LYMPHOCYTES # BLD AUTO: 1.8 K/UL
LYMPHOCYTES NFR BLD: 42.2 %
MAGNESIUM SERPL-MCNC: 1.4 MG/DL
MCH RBC QN AUTO: 28 PG
MCHC RBC AUTO-ENTMCNC: 31.6 G/DL
MCV RBC AUTO: 89 FL
MONOCYTES # BLD AUTO: 0.5 K/UL
MONOCYTES NFR BLD: 12.3 %
NEUTROPHILS # BLD AUTO: 1.8 K/UL
NEUTROPHILS NFR BLD: 41.7 %
NRBC BLD-RTO: 0 /100 WBC
PHOSPHATE SERPL-MCNC: 3 MG/DL
PLATELET # BLD AUTO: 84 K/UL
PMV BLD AUTO: 11.2 FL
POTASSIUM SERPL-SCNC: 3.6 MMOL/L
RBC # BLD AUTO: 3.47 M/UL
SODIUM SERPL-SCNC: 139 MMOL/L
WBC # BLD AUTO: 4.22 K/UL

## 2018-04-18 PROCEDURE — 25000003 PHARM REV CODE 250: Performed by: STUDENT IN AN ORGANIZED HEALTH CARE EDUCATION/TRAINING PROGRAM

## 2018-04-18 PROCEDURE — C9113 INJ PANTOPRAZOLE SODIUM, VIA: HCPCS | Performed by: STUDENT IN AN ORGANIZED HEALTH CARE EDUCATION/TRAINING PROGRAM

## 2018-04-18 PROCEDURE — 83735 ASSAY OF MAGNESIUM: CPT

## 2018-04-18 PROCEDURE — 97161 PT EVAL LOW COMPLEX 20 MIN: CPT

## 2018-04-18 PROCEDURE — 63600175 PHARM REV CODE 636 W HCPCS: Performed by: STUDENT IN AN ORGANIZED HEALTH CARE EDUCATION/TRAINING PROGRAM

## 2018-04-18 PROCEDURE — 85025 COMPLETE CBC W/AUTO DIFF WBC: CPT

## 2018-04-18 PROCEDURE — G8978 MOBILITY CURRENT STATUS: HCPCS | Mod: CI

## 2018-04-18 PROCEDURE — 80048 BASIC METABOLIC PNL TOTAL CA: CPT

## 2018-04-18 PROCEDURE — 84100 ASSAY OF PHOSPHORUS: CPT

## 2018-04-18 PROCEDURE — 36415 COLL VENOUS BLD VENIPUNCTURE: CPT

## 2018-04-18 PROCEDURE — G8979 MOBILITY GOAL STATUS: HCPCS | Mod: CI

## 2018-04-18 PROCEDURE — G8980 MOBILITY D/C STATUS: HCPCS | Mod: CI

## 2018-04-18 RX ORDER — POTASSIUM CHLORIDE 20 MEQ/1
40 TABLET, EXTENDED RELEASE ORAL ONCE
Status: COMPLETED | OUTPATIENT
Start: 2018-04-18 | End: 2018-04-18

## 2018-04-18 RX ORDER — MAGNESIUM SULFATE HEPTAHYDRATE 40 MG/ML
2 INJECTION, SOLUTION INTRAVENOUS ONCE
Status: COMPLETED | OUTPATIENT
Start: 2018-04-18 | End: 2018-04-18

## 2018-04-18 RX ORDER — POLYETHYLENE GLYCOL 3350 17 G/17G
17 POWDER, FOR SOLUTION ORAL DAILY
Refills: 0
Start: 2018-04-18 | End: 2019-05-10 | Stop reason: SDUPTHER

## 2018-04-18 RX ADMIN — MAGNESIUM SULFATE IN WATER 2 G: 40 INJECTION, SOLUTION INTRAVENOUS at 08:04

## 2018-04-18 RX ADMIN — LEVOTHYROXINE SODIUM 88 MCG: 88 TABLET ORAL at 05:04

## 2018-04-18 RX ADMIN — ENOXAPARIN SODIUM 40 MG: 100 INJECTION SUBCUTANEOUS at 08:04

## 2018-04-18 RX ADMIN — POTASSIUM CHLORIDE 40 MEQ: 1500 TABLET, EXTENDED RELEASE ORAL at 08:04

## 2018-04-18 RX ADMIN — DEXTROSE 40 MG: 50 INJECTION, SOLUTION INTRAVENOUS at 08:04

## 2018-04-18 NOTE — PLAN OF CARE
Problem: Patient Care Overview  Goal: Plan of Care Review  Outcome: Ongoing (interventions implemented as appropriate)  Pt verbalize understanding plan of care . Frequent rounds done . Tolerating regular diet. No c/o pain or discomfort. Will cont to monitor. Safety precautions in place.

## 2018-04-18 NOTE — PLAN OF CARE
Problem: Physical Therapy Goal  Goal: Physical Therapy Goal  Outcome: Outcome(s) achieved Date Met: 04/18/18  No goals set today.  D/C TED PT (4/18/2018)

## 2018-04-18 NOTE — NURSING
Pt discharged per MD orders Pt verbalized understanding of discharge instructions, written instructions given . IV to left hand Dc'd  Tolerated well. Pt discharged via wc, accompanied by family and staff.

## 2018-04-18 NOTE — PLAN OF CARE
Problem: Patient Care Overview  Goal: Plan of Care Review  Pt and daughter verbalize understanding of plan of care, fall precautions maintained , pain level well controlled.

## 2018-04-18 NOTE — DISCHARGE SUMMARY
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 Ward with extensive TIFFANY (>2h) and multiple enterotomies who presents today with repeat pSBO.     Patient states that since last year she has been dealing with intermittent abdominal pain due to her scar tissue for which she has been seeing Dr. Erazo and for the past weeks she has been having more liquid stools with decreased amount of quantity until two days ago when she started having increased crampy epigastric abdominal pain. Last BM was this morning, but has not passed any gas for the past days. Has some nausea but no emesis.     CT abdomen shows pSBO. Takes daily Fibercon for constipation.     * No surgery found *      Indwelling Lines/Drains at time of discharge:   Lines/Drains/Airways     Drain                 Gastrostomy/Enterostomy 04/01/17 1024 Gastrostomy tube w/o balloon LUQ decompression 381 days              Hospital Course:   Patient presented to the ER with partial small bowel obstruction. She was admitted to the surgical service, made NPO and given IVF. She underwent gastrograffin small bowel follow through, in which contrast went to the colon. She began having bowel movements, and he abdominal pain improved. Her vitals remained stable, and she was afebrile all throughout her hospital course. Labs were reviewed and electrolytes were replaced appropriately. Diet was advanced, and she was able to tolerate a regular diet prior to discharge. She was  ambulating without difficulty and had normal bowel function prior to discharge. Patient was deemed suitable for discharge on hospital day 2. She was discharged home with medications and instructions as below. She voiced understanding of the instructions prior to discharge.     For more thorough information, please refer to the hospital record and operative report.    Consults:   Consults         Status Ordering Provider     Inpatient consult to General surgery  Once     Provider:  (Not yet assigned)    Completed ILYA GUZMAN          Significant Diagnostic Studies: Labs:   BMP:   Recent Labs  Lab 04/17/18  0229   *      K 4.0      CO2 21*   BUN 12   CREATININE 0.8   CALCIUM 8.8   , CMP   Recent Labs  Lab 04/17/18 0229      K 4.0      CO2 21*   *   BUN 12   CREATININE 0.8   CALCIUM 8.8   PROT 6.6   ALBUMIN 3.2*   BILITOT 0.9   ALKPHOS 53*   AST 28   ALT 29   ANIONGAP 10   ESTGFRAFRICA >60.0   EGFRNONAA >60.0   , CBC   Recent Labs  Lab 04/17/18 0229 04/18/18  0435   WBC 8.33  --  4.22   HGB 11.0*  --  9.7*   HCT 34.0*  < > 30.7*   *  --  84*   < > = values in this interval not displayed.,   All labs within the past 24 hours have been reviewed    Radiology:   CT scan: CT ABDOMEN PELVIS WITH CONTRAST:   Results for orders placed or performed during the hospital encounter of 04/17/18   CT Abdomen Pelvis With Contrast    Narrative    EXAMINATION:  CT ABDOMEN PELVIS WITH CONTRAST    CLINICAL HISTORY:  abdominal pain; multiple SBO in the past;    TECHNIQUE:  Low dose axial images, sagittal and coronal reformations were obtained from the lung bases to the pubic symphysis following the IV administration of 75 mL of Omnipaque 350 .  Oral contrast was not given. Postcontrast images were also obtained in the delayed phase.    COMPARISON:  CT abdomen pelvis with contrast, 08/26/2017 and 03/30/2017.    FINDINGS:  Lower Chest:    Lung bases are clear.  Heart size is  normal.    Abdomen:    Liver is normal in contour and demonstrates diffuse hypoattenuation suggestive of steatosis.  Gallbladder is surgically absent.  No intrahepatic biliary ductal dilatation.    Spleen, adrenals, and pancreas are unremarkable.    The kidneys are symmetric.  Stable mild prominence of the right ureter and collecting system.  No gross hydronephrosis or asymmetric renal enhancement.    Postoperative changes of partial colectomy and small bowel anastomosis again noted.  There are multiple moderately distended loops of small bowel measuring up to 3.8 cm in diameter.  There is fecalization of small bowel contents.  Patulous small bowel anastomosis in the mid lower abdomen with fecalized internal contents.  Possible transition point noted just deep to the anterior left abdominal wall (axial delayed series 4, image 31).  Small amount of focal free fluid is noted adjacent to this site.  Multiple areas of small bowel wall thickening in the right niru abdomen (for example, coronal image 15).  Scattered diverticulosis.    No pneumoperitoneum or organized fluid collection.    Mildly prominent mesenteric lymph nodes.  No bulky retroperitoneal lymphadenopathy.    Abdominal aorta is normal in caliber.    Portal, splenic, and superior mesenteric veins are patent.    Pelvis:    Urinary bladder and rectum are unremarkable.  Uterus surgically absent.  No free fluid in the pelvis.  No pelvic lymphadenopathy.    Bones and soft tissues:    No aggressive osseous lesions.  Stable bilateral pars defects at L5 with grade 2 anterolisthesis of L5 with respect to S1.      Impression    Findings suggestive of partial small bowel obstruction with transition point adjacent to the left anterior abdominal wall, likely related to adhesions.    Hepatic steatosis.    Multiple additional stable findings as described above.    This report was flagged in Epic as abnormal.      Electronically signed by: Herman Flowers  MD  Date:    04/17/2018  Time:    04:50        Pending Diagnostic Studies:     Procedure Component Value Units Date/Time    Basic metabolic panel [[id number]] Collected:  04/18/18 0435    Order Status:  Sent Lab Status:  In process Updated:  04/18/18 0543    Specimen:  Blood from Blood     Magnesium [[id number]] Collected:  04/18/18 0435    Order Status:  Sent Lab Status:  In process Updated:  04/18/18 0542    Specimen:  Blood from Blood     Phosphorus [[id number]] Collected:  04/18/18 0435    Order Status:  Sent Lab Status:  In process Updated:  04/18/18 0543    Specimen:  Blood from Blood         Final Active Diagnoses:    Diagnosis Date Noted POA    PRINCIPAL PROBLEM:  Partial small bowel obstruction [K56.600] 09/01/2014 Yes    Abdominal pain [R10.9] 04/17/2018 Yes    Hypothyroid [E03.9] 01/11/2013 Yes      Problems Resolved During this Admission:    Diagnosis Date Noted Date Resolved POA      Patient Active Problem List   Diagnosis    Lumbar spondylosis    Cervical spondylosis with radiculopathy    Insomnia    Carpal tunnel syndrome    Headache(784.0)    Hypothyroid    History of colon cancer    Irritable bowel syndrome    Chronic sinusitis of right maxilla    Diarrhea    Pelvic muscle wasting    GERD (gastroesophageal reflux disease)    Fatty liver    Partial small bowel obstruction    Family history of breast cancer    Numbness of face    Gait disturbance    Type 2 diabetes mellitus without complication, without long-term current use of insulin    Acute deep vein thrombosis (DVT) of upper extremity    Wound infection after surgery    Hematoma    Chronic diarrhea    Fecal incontinence    Abdominal pain     Discharged Condition: good    Disposition: Home or Self Care    Follow Up:  Follow-up Information     Rocio Mc MD.    Specialty:  Internal Medicine  Contact information:  [facility] [address] [address]  [phone]                 Patient Instructions:      Ambulatory referral to Outpatient Case Management   Referral Priority: Routine Referral Type: Consultation   Referral Reason: Specialty Services Required    Number of Visits Requested: 1      Diet diabetic     Activity as tolerated     Notify your health care provider if you experience any of the following:  difficulty breathing or increased cough     Notify your health care provider if you experience any of the following:  severe uncontrolled pain     Notify your health care provider if you experience any of the following:  redness, tenderness, or signs of infection (pain, swelling, redness, odor or green/yellow discharge around incision site)     Notify your health care provider if you experience any of the following:  persistent nausea and vomiting or diarrhea     Notify your health care provider if you experience any of the following:  temperature >100.4       Medications:  Reconciled Home Medications:      Medication List      START taking these medications    polyethylene glycol 17 gram/dose powder  Commonly known as:  GLYCOLAX  Take 17 g by mouth once daily.        CONTINUE taking these medications    atorvastatin 40 MG tablet  Commonly known as:  LIPITOR  Take 1 tablet by mouth once daily.     azelastine 137 mcg (0.1 %) nasal spray  Commonly known as:  ASTELIN  1 spray (137 mcg total) by Nasal route 2 (two) times daily.     biotin 5,000 mcg Tbdl  Take by mouth.     blood sugar diagnostic Strp  Dispense Brandicted contour strips; test blood sugar once daily     blood-glucose meter kit  Commonly known as:  CONTOUR METER  Please dispense Madie Contour meter and supplies Use as instructed Test Blood sugar once daily     cyanocobalamin 1,000 mcg/mL injection  1,000 mcg.     diphenhydrAMINE 25 mg capsule  Commonly known as:  BENADRYL  Take 1 each (25 mg total) by mouth nightly as needed for Itching, Allergies or Insomnia.     DUREZOL 0.05 % Drop ophthalmic solution  Generic drug:  difluprednate  instill 1 to 2  drops INTO SURGERY EYE twice a day STARTING AFTER SURGERY     fluticasone 50 mcg/actuation nasal spray  Commonly known as:  FLONASE  2 sprays by Each Nare route once daily.     gabapentin 600 MG tablet  Commonly known as:  NEURONTIN  Take 600 mg by mouth 2 (two) times daily.     hydrocortisone 2.5 % cream     ICAPS AREDS ORAL  Take by mouth 2 (two) times daily.     ipratropium 42 mcg (0.06 %) nasal spray  Commonly known as:  ATROVENT     lancets Misc  Commonly known as:  ONETOUCH ULTRASOFT LANCETS  Test blood sugar once daily     levothyroxine 88 MCG tablet  Commonly known as:  SYNTHROID  Take 1 tablet (88 mcg total) by mouth before breakfast. Brand only synthroid     magnesium 30 mg Tab  Take 500 capsules by mouth. Patient's taking magnesium 500mg QD     meloxicam 15 MG tablet  Commonly known as:  MOBIC  Take 1 tablet (15 mg total) by mouth daily as needed for Pain. With food for arthritis     metFORMIN 750 MG 24 hr tablet  Commonly known as:  GLUCOPHAGE-XR  Take 1 tablet (750 mg total) by mouth 2 (two) times daily with meals.     metronidazole 0.75% 0.75 % Crea  Commonly known as:  METROCREAM     nitrofurantoin (macrocrystal-monohydrate) 100 MG capsule  Commonly known as:  MACROBID  Take 1 capsule (100 mg total) by mouth 2 (two) times daily.     ondansetron 8 MG tablet  Commonly known as:  ZOFRAN  Take 1 tablet (8 mg total) by mouth every 8 (eight) hours as needed for Nausea.     ospemifene 60 mg Tab  Commonly known as:  OSPHENA  Take 60 mg by mouth once daily.     pantoprazole 40 MG tablet  Commonly known as:  PROTONIX  Take 1 tablet (40 mg total) by mouth once daily.     PROLENSA 0.07 % Drop  Generic drug:  bromfenac     vitamin D 1000 units Tab  Take 185 mg by mouth once daily. D3        STOP taking these medications    ciprofloxacin HCl 0.3 % ophthalmic solution  Commonly known as:  CILOXAN     doxycycline 100 MG tablet  Commonly known as:  VIBRA-TABS          Time spent on the discharge of patient: 2  minutes    Sanjuana Redding PA-C  General Surgery  [facility]

## 2018-04-18 NOTE — ASSESSMENT & PLAN NOTE
Ms Judd is a 74 yo F with PMH of DM and 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 Ward with extensive TIFFANY (>2h) and multiple enterotomies who presents today with repeat pSBO.       - regular diet  - PO pain/nausea meds    - bowel regimen  - d/c IVF  - DVT/GI prophylaxis  - plan for discharge today pending toleration of diet

## 2018-04-18 NOTE — SUBJECTIVE & OBJECTIVE
Interval History:   Patient seen and examined, no acute events overnight   Gastrograffin with contrast in the colon in an hour.   (+) BM. Hungry this morning.  Afebrile/VSS    Medications:  Continuous Infusions:   sodium chloride 0.9% 125 mL/hr at 04/17/18 0740     Scheduled Meds:   enoxaparin  40 mg Subcutaneous Daily    levothyroxine  88 mcg Oral Before breakfast    pantoprazole 40 mg in dextrose 5 % 100 mL infusion (ready to mix system)  40 mg Intravenous Daily     PRN Meds:dextrose 50%, diphenhydrAMINE, glucagon (human recombinant), insulin aspart U-100, morphine, promethazine (PHENERGAN) IVPB, sodium chloride 0.9%     Review of patient's allergies indicates:   Allergen Reactions    Percocet  [oxycodone-acetaminophen]      Other reaction(s): Itching    Sulfa (sulfonamide antibiotics)      Other reaction(s): Nausea  Other reaction(s): Itching    Adhesive Rash     Objective:     Vital Signs (Most Recent):  Temp: 96.4 °F (35.8 °C) (04/18/18 0400)  Pulse: 70 (04/18/18 0400)  Resp: 16 (04/18/18 0400)  BP: 132/68 (04/18/18 0400)  SpO2: 95 % (04/18/18 0400) Vital Signs (24h Range):  Temp:  [96.1 °F (35.6 °C)-98.2 °F (36.8 °C)] 96.4 °F (35.8 °C)  Pulse:  [66-74] 70  Resp:  [16] 16  SpO2:  [95 %-99 %] 95 %  BP: ()/(48-68) 132/68     Weight: 68 kg (150 lb)  Body mass index is 27.44 kg/m².    Intake/Output - Last 3 Shifts       04/16 0700 - 04/17 0659 04/17 0700 - 04/18 0659    Urine (mL/kg/hr)  500 (0.3)    Total Output   500    Net   -500          Urine Occurrence  2 x          Physical Exam   Constitutional: She is oriented to person, place, and time. She appears well-developed and well-nourished. No distress.   HENT:   Head: Normocephalic and atraumatic.   Cardiovascular: Normal rate and regular rhythm.    Pulmonary/Chest: Effort normal. No respiratory distress.   Abdominal: Soft. She exhibits no distension. There is no tenderness.   Neurological: She is alert and oriented to person, place, and time.    Skin: Skin is warm and dry.       Significant Labs:  CBC:   Recent Labs  Lab 04/18/18  0435   WBC 4.22   RBC 3.47*   HGB 9.7*   HCT 30.7*   PLT 84*   MCV 89   MCH 28.0   MCHC 31.6*     BMP:   Recent Labs  Lab 04/17/18 0229   *      K 4.0      CO2 21*   BUN 12   CREATININE 0.8   CALCIUM 8.8     CMP:   Recent Labs  Lab 04/17/18 0229   *   CALCIUM 8.8   ALBUMIN 3.2*   PROT 6.6      K 4.0   CO2 21*      BUN 12   CREATININE 0.8   ALKPHOS 53*   ALT 29   AST 28   BILITOT 0.9     LFTs:   Recent Labs  Lab 04/17/18 0229   ALT 29   AST 28   ALKPHOS 53*   BILITOT 0.9   PROT 6.6   ALBUMIN 3.2*

## 2018-04-18 NOTE — PT/OT/SLP EVAL
"Physical Therapy Evaluation and Discharge Note    Patient Name:  Anayeli Judd   MRN:  [id number]    Recommendations:     Discharge Recommendations:   (home with no needs)   Discharge Equipment Recommendations: none   Barriers to discharge: Decreased caregiver support    Assessment:     Anayeli Judd is a 73 y.o. female admitted with a medical diagnosis of Partial small bowel obstruction. .  At this time, patient is functioning at their prior level of function and does not require further acute PT services.     Recent Surgery: * No surgery found *      Plan:     During this hospitalization, patient does not require further acute PT services.  Please re-consult if situation changes.     Plan of Care Reviewed with: patient, daughter    Subjective     Communicated with nursing prior to session.  Patient found supine in bed, with daughter in room, upon PT entry to room, agreeable to evaluation.      Chief Complaint: no complaints  Patient comments/goals:  "i'm leaving today" "I will go for a walk with you then I want to get back in the bed"  Pain/Comfort:  · Pain Rating 1: 0/10  · Pain Rating Post-Intervention 1: 0/10    Patients cultural, spiritual, Orthodox conflicts given the current situation: none    Living Environment:  Pt lives alone in 7th floor apartment with elevator access and no MICH.  Prior to admission, patients level of function was independent in all activities.  Patient has the following equipment: none.  DME owned (not currently used): shower chair.  Upon discharge, patient will have assistance from daughters as needed.    Objective:     Patient found with: peripheral IV     General Precautions: Standard, fall   Orthopedic Precautions:N/A   Braces: N/A     Exams:  · RUE ROM: WFL  · RUE Strength: WFL  · LUE ROM: WFL  · LUE Strength: WFL  · RLE ROM: WFL  · RLE Strength: WFL  · LLE ROM: WFL  · LLE Strength: WFL    Functional Mobility:  · Bed Mobility:     · Rolling Left:  stand by " assistance  · Scooting: stand by assistance  · Supine to Sit: stand by assistance  · Sit to Supine: stand by assistance  · Transfers:     · Sit to Stand:  supervision with no AD  · Gait: ~300' with no AD and with SBA-Supervision; normal gait speed and step length; no LOB  · Balance: good static/dynamic sitting balance seated EOB during strength testing; good static/dynamic standing balance during gait    AM-PAC 6 CLICK MOBILITY  Total Score:23       Therapeutic Activities and Exercises:   Pt performed bed mobility with SBA, sit<>stand with supervision, and ambulated ~300' with no AD SBA-supervision.    PT educated pt on:  -importance of mobility  -PT POC (discharge from AC Pt)  -discharge planning (home with no needs, agreed and no concern about discharging home)    Patient left supine with all lines intact, call button in reach and daughter present.    GOALS:    Physical Therapy Goals     Not on file          Multidisciplinary Problems (Resolved)        Problem: Physical Therapy Goal    Goal Priority Disciplines Outcome Goal Variances Interventions   Physical Therapy Goal   (Resolved)     PT/OT, PT Outcome(s) achieved                     History:     Past Medical History:   Diagnosis Date    Allergic rhinitis     Arthritis     Blood transfusion     during delivery and     Bowel obstruction     Cervical radiculopathy     followed by dr cloud    Colon cancer     transverse colon; resected; Stage IIA (pT3 pN0 MX)    Diarrhea     Family history of breast cancer     Family history of colon cancer     Fatty liver     GERD (gastroesophageal reflux disease)     History of shingles     Hyperlipidemia     Hypothyroidism     Irritable bowel syndrome     Microscopic colitis     treated     Raynaud phenomenon     Raynaud's disease     Type 2 diabetes mellitus        Past Surgical History:   Procedure Laterality Date    APPENDECTOMY      BACK SURGERY      CARPAL TUNNEL RELEASE       bilateral      SECTION      CHOLECYSTECTOMY  1965    COLECTOMY  2011    Transverse colon resection by Dr. Aguirre    COLONOSCOPY N/A 2017    Procedure: COLONOSCOPY;  Surgeon: Manjit Alvarez MD;  Location: [address]);  Service: Endoscopy;  Laterality: N/A;    EYE SURGERY      Cataract Removal    HYSTERECTOMY      posterolateral fusion with autograft bone and Ford mineralized bone matrix  13    at [facility] for lumbar spine stenosis    TOE SURGERY      TONSILLECTOMY      TRIGGER FINGER RELEASE         Clinical Decision Making:     History  Co-morbidities and personal factors that may impact the plan of care Examination  Body Structures and Functions, activity limitations and participation restrictions that may impact the plan of care Clinical Presentation   Decision Making/ Complexity Score   Co-morbidities:   [] Time since onset of injury / illness / exacerbation  [] Status of current condition  []Patient's cognitive status and safety concerns    [] Multiple Medical Problems (see med hx)  Personal Factors:   [] Patient's age  [] Prior Level of function   [] Patient's home situation (environment and family support)  [] Patient's level of motivation  [] Expected progression of patient      HISTORY:(criteria)    [] 20900 - no personal factors/history    [] 88578 - has 1-2 personal factor/comorbidity     [] 24599 - has >3 personal factor/comorbidity     Body Regions:  [] Objective examination findings  [] Head     []  Neck  [] Trunk   [] Upper Extremity  [] Lower Extremity    Body Systems:  [] For communication ability, affect, cognition, language, and learning style: the assessment of the ability to make needs known, consciousness, orientation (person, place, and time), expected emotional /behavioral responses, and learning preferences (eg, learning barriers, education  needs)  [] For the neuromuscular system: a general assessment of gross coordinated movement (eg, balance, gait,  locomotion, transfers, and transitions) and motor function  (motor control and motor learning)  [] For the musculoskeletal system: the assessment of gross symmetry, gross range of motion, gross strength, height, and weight  [] For the integumentary system: the assessment of pliability(texture), presence of scar formation, skin color, and skin integrity  [] For cardiovascular/pulmonary system: the assessment of heart rate, respiratory rate, blood pressure, and edema     Activity limitations:    [] Patient's cognitive status and saf ety concerns          [] Status of current condition      [] Weight bearing restriction  [] Cardiopulmunary Restriction    Participation Restrictions:   [] Goals and goal agreement with the patient     [] Rehab potential (prognosis) and probable outcome      Examination of Body System: (criteria)    [] 44479 - addressing 1-2 elements    [] 30912 - addressing a total of 3 or more elements     [] 27473 -  Addressing a total of 4 or more elements         Clinical Presentation: (criteria)  Choose one     On examination of body system using standardized tests and measures patient presents with (CHOOSE ONE) elements from any of the following: body structures and functions, activity limitations, and/or participation restrictions.  Leading to a clinical presentation that is considered (CHOOSE ONE)                              Clinical Decision Making  (Eval Complexity):  Choose One     Time Tracking:     PT Received On: 04/18/18  PT Start Time: 1042     PT Stop Time: 1056  PT Total Time (min): 14 min     Billable Minutes: Evaluation 14 mins      FORD Hernandez  04/18/2018

## 2018-04-18 NOTE — PROGRESS NOTES
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 Ward with extensive TIFFANY (>2h) and multiple enterotomies who presents today with repeat pSBO.     Patient states that since last year she has been dealing with intermittent abdominal pain due to her scar tissue for which she has been seeing Dr. Erazo and for the past weeks she has been having more liquid stools with decreased amount of quantity until two days ago when she started having increased crampy epigastric abdominal pain. Last BM was this morning, but has not passed any gas for the past days. Has some nausea but no emesis.     CT abdomen shows pSBO. Takes daily Fibercon for constipation.     Post-Op Info:  * No surgery found *         Interval History:   Patient seen and examined, no acute events overnight   Gastrograffin with contrast in the colon in an hour.   (+) BM. Hungry this morning.  Afebrile/VSS    Medications:  Continuous Infusions:   sodium chloride 0.9% 125 mL/hr at 04/17/18 0740     Scheduled Meds:   enoxaparin  40 mg Subcutaneous Daily    levothyroxine  88 mcg Oral Before breakfast    pantoprazole 40 mg in dextrose 5 % 100 mL infusion (ready to mix system)  40 mg Intravenous Daily     PRN Meds:dextrose 50%, diphenhydrAMINE, glucagon (human recombinant), insulin aspart U-100, morphine, promethazine (PHENERGAN) IVPB, sodium chloride 0.9%     Review of patient's allergies indicates:   Allergen Reactions    Percocet  [oxycodone-acetaminophen]      Other reaction(s): Itching    Sulfa (sulfonamide antibiotics)      Other reaction(s): Nausea  Other reaction(s): Itching    Adhesive Rash     Objective:     Vital Signs (Most Recent):  Temp: 96.4 °F (35.8 °C) (04/18/18 0400)  Pulse: 70 (04/18/18  0400)  Resp: 16 (04/18/18 0400)  BP: 132/68 (04/18/18 0400)  SpO2: 95 % (04/18/18 0400) Vital Signs (24h Range):  Temp:  [96.1 °F (35.6 °C)-98.2 °F (36.8 °C)] 96.4 °F (35.8 °C)  Pulse:  [66-74] 70  Resp:  [16] 16  SpO2:  [95 %-99 %] 95 %  BP: ()/(48-68) 132/68     Weight: 68 kg (150 lb)  Body mass index is 27.44 kg/m².    Intake/Output - Last 3 Shifts       04/16 0700 - 04/17 0659 04/17 0700 - 04/18 0659    Urine (mL/kg/hr)  500 (0.3)    Total Output   500    Net   -500          Urine Occurrence  2 x          Physical Exam   Constitutional: She is oriented to person, place, and time. She appears well-developed and well-nourished. No distress.   HENT:   Head: Normocephalic and atraumatic.   Cardiovascular: Normal rate and regular rhythm.    Pulmonary/Chest: Effort normal. No respiratory distress.   Abdominal: Soft. She exhibits no distension. There is no tenderness.   Neurological: She is alert and oriented to person, place, and time.   Skin: Skin is warm and dry.       Significant Labs:  CBC:   Recent Labs  Lab 04/18/18  0435   WBC 4.22   RBC 3.47*   HGB 9.7*   HCT 30.7*   PLT 84*   MCV 89   MCH 28.0   MCHC 31.6*     BMP:   Recent Labs  Lab 04/17/18 0229   *      K 4.0      CO2 21*   BUN 12   CREATININE 0.8   CALCIUM 8.8     CMP:   Recent Labs  Lab 04/17/18 0229   *   CALCIUM 8.8   ALBUMIN 3.2*   PROT 6.6      K 4.0   CO2 21*      BUN 12   CREATININE 0.8   ALKPHOS 53*   ALT 29   AST 28   BILITOT 0.9     LFTs:   Recent Labs  Lab 04/17/18 0229   ALT 29   AST 28   ALKPHOS 53*   BILITOT 0.9   PROT 6.6   ALBUMIN 3.2*     Assessment/Plan:     * Partial small bowel obstruction    Ms Judd is a 74 yo F with PMH of DM and 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 Ward with extensive TIFFANY (>2h) and multiple enterotomies who presents today with repeat pSBO.       - regular  diet  - PO pain/nausea meds    - bowel regimen  - d/c IVF  - DVT/GI prophylaxis  - plan for discharge today pending toleration of diet              Hypothyroid    Home synthroid            Sanjuana Redding PA-C   [id number]  General Surgery  [facility]

## 2018-04-18 NOTE — NURSING
At 0700 pt awake alert resp even and unlabored.  Skin warm and dry to touch. HOB up at 35 degrees. Pt eating a regular diet at time of assessment.  Vitals : 138/65, 65, 98.2, 18. o2 94 % room air. Pt denies all pain at this time . Safety precautions in place. Bed in lowest position.

## 2018-04-19 ENCOUNTER — OUTPATIENT CASE MANAGEMENT (OUTPATIENT)
Dept: ADMINISTRATIVE | Facility: OTHER | Age: 73
End: 2018-04-19

## 2018-04-19 NOTE — PROGRESS NOTES
Thank you for the referral. The following patient has been assigned to Eve Kasper RN with Outpatient Complex Care Management for high risk screening.    Reason for referral: Partial small bowel obstruction    [facility] at [phone] with any questions.    Thank you,    Adriane Atkins, [facility]  Outpatient Complex Care Mgmt  Ext [phone]

## 2018-04-23 ENCOUNTER — OUTPATIENT CASE MANAGEMENT (OUTPATIENT)
Dept: ADMINISTRATIVE | Facility: OTHER | Age: 73
End: 2018-04-23

## 2018-04-23 NOTE — PROGRESS NOTES
Talked to patient to update plan of care for OPCM.  Patient is a 72 y/o female with dx of Hx of Colon CA, DMII, Hypothyroidism, Irritable bowel syndrome, GERD, Partial SBO.  Patient was recently hospitalized from 4/17-4/18 for partial SBO.  Medication reconciliation performed and is taking all medications as prescribed.  PHQ2 performed and score of 0 obtained.  Patient stated that she does live alone and does drive to and from her medical appointments.  Patient stated that she is testing her blood glucose at home weekly and stated that her blood glucose ranges from 130-140mg/dl.  Last A1c was from 2/20 and was 6.8%.  Patient stated that she has not had any falls within the past year and doesn't use need any assistance with ambulation.  Discussed with patient to call Dr. Mc to make a f/u appointment for this week since patient needs a f/u appointment post hospitalization and patient stated that she is calling today to make a f/u appointment with Dr. Mc.  Patient stated that she does have a disaster plan in place.  Patient stated that she does have a MPOA and FPOA in place.   Encouraged patient to call this RN if having any questions/concerns.   I will mail my contact information should any new problems/concerns arise in the future.  I will mail literature about [employer].  I will mail patient educational materials regarding:  SBO, Diabetes Management Guide and Diarrhea.  I will mail patient a blood glucose log.  Will admit to OPCM.  Will continue to follow.  MILTON Kasper OPCM-RN    Interventions:  Discussed with patient to call Dr. Che's office to set up an f/u appointment.  Encouraged patient to continue to take all medications as prescribed.   Encouraged patient to call this RN if having any questions/concerns.  Medication Reconciliation performed  PHQ2 performed.    Plan:  Continue to educate about DM. Review signs and symptoms of high and low blood sugar. Encourage patient to follow  medication and treatment regimen. Encourage patient to maintain follow up with doctors.   -Continue to educate about SBO. Review signs and symptoms of SBO. Encourage patient to follow medication and treatment regimen. Encourage patient to maintain follow up with doctors.   -F/U with patient in regards to making a f/u appointment with Dr Che.  MILTON Kasper, OPCM-RN

## 2018-04-23 NOTE — PATIENT INSTRUCTIONS
Managing Diabetes: The A1C Test       Healthy red blood cells have some glucose stuck to them. A high A1C means that unhealthy amounts of glucose are stuck to the cells.   What is the A1C test?  Using your meter helps you track your blood sugar every day. But your glucose meter tells you the value at the time of testing only. You also need to know if your treatment plan is keeping you healthy over time. The hemoglobin A1C (or glycated hemoglobin) test can help. This test measures your average blood sugar level over a few months. A higher A1C result means that you have a higher risk of developing complications.  The A1C test  The A1C is a blood test done by your healthcare provider. You will likely have an A1C test every 3 to 6 months.  Your blood glucose goal  A1C has been shown as a percentage. But it can also be shown as a number representing the estimated Average Glucose (eAG). Unlike the A1C percentage, eAG is a number similar to the numbers listed on your daily glucose monitor. Both A1C and eAG measure the amount of glucose stuck to a protein called hemoglobin in red blood cells. Your healthcare provider will help you figure out what your ideal A1C or eAG should be. Your target number will depend on your age, general health, and other factors. If your current number is too high, your treatment plan may need changes, such as different medicines.  Sample results  Most people aim for an A1c lower than 7%. Thats an eAG less than 154 mg/dL. Or, your healthcare provider may want you to aim for an A1C of 6%. Thats an eAG of 126 mg/dL.     Glucose calculator  Visit http://professional.diabetes.org/diapro/glucose_calc for a chart that helps convert your A1C percentages into eAG numbers.   Date Last Reviewed: 6/1/2016  © 8135-5139 [employer]. [address] [place] [address], [place] [address]. All rights reserved. This information is not intended as a substitute for professional medical care. Always follow your  healthcare professional's instructions.        Diet and Lifestyle Tips for Irritable Bowel Syndrome (IBS)    Your healthcare professional may suggest some lifestyle changes to help control your IBS. Changing your diet and managing stress are two of the most important. Follow your healthcare providers instructions and try some of the suggestions below.  Change your diet  Your diet may be an important cause of IBS symptoms. You may want to try the following:  · Pay attention to what foods bother you, and avoid them. For example, dairy products are hard for some people to digest.  · Drink 6 to 8 glasses of water a day.  · Avoid caffeine and tobacco. These are muscle stimulants and can affect the working of your digestive tract.  · Avoid alcohol, which can irritate your digestive tract and make your symptoms worse.  · Eat more fiber if constipation is a problem. Fiber makes the stool softer and easier to pass through the colon.  Reduce stress  If stress or anxiety makes your IBS symptoms worse, learning how to manage stress may help you feel better. Try these tips:  · Identify the causes of stress in your life.  · Learn new ways to cope with them.  · Regular exercise is a great way to relieve stress. It can also help ease constipation.  Date Last Reviewed: 7/1/2016  © 8551-5851 [employer]. [address] [place] [address], [place] [address]. All rights reserved. This information is not intended as a substitute for professional medical care. Always follow your healthcare professional's instructions.        What is Irritable Bowel Syndrome (IBS)?     Muscle contraction moves food through the digestive tract.      People who have irritable bowel syndrome (IBS) have digestive tracts that react abnormally to certain substances or to stress. This leads to symptoms like cramps, gas, bloating, pain, constipation, and diarrhea. Sometimes called spastic colon, IBS is a common condition that is not a disease, but rather a  "group of symptoms that happen together.  IBS--a motility problem  The muscle movement that passes food through the digestive tract is called motility. When you have IBS, the normal motility of the digestive tract (especially the colon) is disrupted. Motility may speed up, slow down, or become irregular. If stool passes too quickly through the colon, not enough water is absorbed from it. Loose, watery stools (diarrhea) can result. If stool passes through the colon too slowly, too much water is absorbed and the stool becomes hard and dry (constipation). Also, stool and gas may back up and cause painful pressure and cramping. There is no single test that can diagnose IBS. It is a group of symptoms that help your healthcare provider with the diagnosis. Often multiple blood, stool, radiologic tests, or even colonoscopy are performed in the evaluation of people suspected to have IBS. These are done primarily to make sure that there are no other illnesses that can account for your symptoms.   What causes IBS?  A great deal of research has been done on IBS, but the cause is still not known. Some of the possible factors include:   · Smoking, eating certain foods, or drinking alcohol or caffeinated drinks can cause, or "trigger," symptoms of IBS.  · Although no one knows for sure, IBS may be caused by a problem with the nerves or muscles in your digestive tract.  · There is also some evidence that certain bacteria found after a severe gastroinstestinal infection in the small intestine and colon may cause IBS.  · While stress and anxiety worsen the symptoms of IBS, it is not believed to be the cause.   What you can do  Recommendations include:  · Certain medicines may help regulate the working of your digestive tract. Your healthcare provider may prescribe one or more for you.  · Medicine cant cure IBS, but it may help manage the symptoms.  · Because some medicines may make IBS worse, dont take any medicine, especially " laxatives, unless your healthcare provider prescribes it for you.  · Your healthcare provider may suggest some lifestyle changes to help control your IBS. Two of the most important are changing your diet and managing stress. If diet changes are suggested, ask for nutritional guidance from a dietitian so you maintain a healthy nutritional balance in your food intake.   Date Last Reviewed: 7/1/2016 © 2000-2017 [employer]. [address] [place] [address], [place] [address]. All rights reserved. This information is not intended as a substitute for professional medical care. Always follow your healthcare professional's instructions.        Small Bowel Obstruction     Small bowel obstruction can lead to tissue damage and even tissue death.   A small bowel obstruction occurs when part or all of the small intestine (bowel) is blocked. As a result, digestive contents cant move through the bowel properly and out of the body. Treatment is needed right away to remove the blockage. This can ease painful symptoms. It can also prevent serious problems, such as tissue death or bursting (rupture) of the small bowel. Without treatment, a small bowel obstruction can be fatal.  Causes of small bowel obstruction  A small bowel obstruction can be caused by:  · Scar tissue (adhesions). These may form after belly (abdominal) surgery or an infection.  · Hernia. A hernia is when an organ pushes through a weak spot or tear in the abdomen wall. Part of the small bowel can push out and be seen as a bulge under the belly. Hernias can also occur internally.  · Certain health problems. These include when part of the bowel slides inside another part (intussusception). Other causes include irritable bowel disease such as Crohns disease, and inflammation and sores in the intestine (ulcerative colitis).  · Abnormal tissue growths (tumors). These can form on the inside or outside of the small bowel. They are usually due to cancer.  Symptoms of  small bowel obstruction  Common symptoms include:  · Belly cramping and pain  · Belly swelling and bloating  · Upset stomach (nausea) and vomiting  · Can't  pass gas  · Can't pass stool (constipation)  · Diarrhea  Diagnosing small bowel obstruction  Your provider will ask about your symptoms and health history. Youll also have a physical exam. Tests may also be done to confirm the problem. These can include:  · Imaging tests. These provide pictures of the small bowel. Common tests include X-rays and a CT scan.  · Blood tests. These check for infection and other problems, such as excess fluid loss (dehydration).  · Upper GI (gastrointestinal) series with a small bowel follow-through. This test takes X-rays of the upper digestive tract from the mouth through the small bowel. An X-ray dye (contrast fluid) is used. The dye coats the inside of your upper digestive tract so it will show up clearly on X-rays.  Treating small bowel obstruction  Treatment takes place in a hospital. As part of your care, the following may be done:  · No food or drink is given by mouth. This allows your bowels to rest.  · An IV (intravenous) line is placed in a vein in your arm or hand. The IV line is used to give fluids. It may also be used to give medicines. These may be needed to ease pain, nausea, and other symptoms. They may also be needed to treat or prevent infections.  · A soft, thin, flexible tube (nasogastric tube) is inserted through your nose and into your stomach. The tube is used to remove extra gas and fluid in your stomach and bowels. This helps to ease symptoms such as pain and swelling.  · In severe cases, surgery is done. This may be needed if the small bowel is almost or totally blocked, or there is a hole in the bowel (bowel perforation). During surgery, the blockage is removed. Parts of the bowel may also be removed if there is tissue death. Other repair may be done as well, depending on what caused the blockage. Your  healthcare provider will give you more information about surgery, if needed.  · Youll be watched closely in the hospital until your symptoms improve. Your provider will tell you when you can go home.  Long-term concerns   After treatment, most people recover with no lasting effects. If a long part of the bowel is removed, there is a greater chance for lifelong digestive problems. Bowel movements may become irregular. Work with your provider to learn the best ways to manage any symptoms you may have, and to protect your health.  When to call your healthcare provider  Call your provider right away if you have any of the following:  · Severe pain (Call 911)  · Belly swelling or cramping that wont go away  · Cant pass stool or gas  · Nausea or vomiting (especially if the vomit looks or smells like stool)   Date Last Reviewed: 7/1/2016  © 4882-7076 [employer]. [address] [place] [address]. All rights reserved. This information is not intended as a substitute for professional medical care. Always follow your healthcare professional's instructions.

## 2018-04-30 ENCOUNTER — OUTPATIENT CASE MANAGEMENT (OUTPATIENT)
Dept: ADMINISTRATIVE | Facility: OTHER | Age: 73
End: 2018-04-30

## 2018-04-30 NOTE — PROGRESS NOTES
Attempted to update plan of care for OPCM; patient asked that this RN call patient back at a later date and time.  MILTON Kasper, OPCM-RN

## 2018-05-07 ENCOUNTER — OUTPATIENT CASE MANAGEMENT (OUTPATIENT)
Dept: ADMINISTRATIVE | Facility: OTHER | Age: 73
End: 2018-05-07

## 2018-05-07 NOTE — PROGRESS NOTES
2 nd attempt to update plan of care for OPCM; left message requesting a return call.  As this is my second unsuccessful attempt to complete follow-up for OPCM, I will mail a letter with my contact information.  MILTON Kasper, OPCM-RN

## 2018-05-07 NOTE — LETTER
May 7, 2018    Anayeli Judd  [address]  [place] [address]             [facility]  [address]  [facility] [address] Dear Anayeli,     I work with [facility] Outpatient Case Management Department. I have been unsuccessful at reaching you to follow-up to see how you have been doing. If you require any future assistance or if any new concerns or problems arise, please do not hesitate to call.     The Outpatient Case Management Department can be reached at [phone] from 8:00AM to 4:30 PM on Monday thru Friday. [facility] also has a program where a nurse is available 24/7 to answer questions or provide medical advice, their number is [phone].    Thanks,      Eve Kasper, RN  Outpatient Case Management

## 2018-05-09 ENCOUNTER — LAB VISIT (OUTPATIENT)
Dept: LAB | Facility: HOSPITAL | Age: 73
End: 2018-05-09
Attending: INTERNAL MEDICINE
Payer: MEDICARE

## 2018-05-09 DIAGNOSIS — N39.0 URINARY TRACT INFECTION WITHOUT HEMATURIA, SITE UNSPECIFIED: ICD-10-CM

## 2018-05-09 PROCEDURE — 87186 SC STD MICRODIL/AGAR DIL: CPT

## 2018-05-09 PROCEDURE — 87086 URINE CULTURE/COLONY COUNT: CPT

## 2018-05-09 PROCEDURE — 87088 URINE BACTERIA CULTURE: CPT

## 2018-05-09 PROCEDURE — 87077 CULTURE AEROBIC IDENTIFY: CPT

## 2018-05-12 LAB — BACTERIA UR CULT: NORMAL

## 2018-05-14 ENCOUNTER — OUTPATIENT CASE MANAGEMENT (OUTPATIENT)
Dept: ADMINISTRATIVE | Facility: OTHER | Age: 73
End: 2018-05-14

## 2018-05-14 NOTE — PROGRESS NOTES
Third attempt to update plan of care for OPCM; left voice message to return call.  Will dis-enroll patient at this time.  Case closed.  MILTON Kasper, OPCM-RN

## 2018-05-16 ENCOUNTER — LAB VISIT (OUTPATIENT)
Dept: LAB | Facility: HOSPITAL | Age: 73
End: 2018-05-16
Attending: INTERNAL MEDICINE
Payer: MEDICARE

## 2018-05-16 DIAGNOSIS — E03.4 HYPOTHYROIDISM DUE TO ACQUIRED ATROPHY OF THYROID: ICD-10-CM

## 2018-05-16 DIAGNOSIS — R73.9 ELEVATED BLOOD SUGAR: ICD-10-CM

## 2018-05-16 DIAGNOSIS — D51.8 OTHER VITAMIN B12 DEFICIENCY ANEMIAS: ICD-10-CM

## 2018-05-16 LAB
ALBUMIN SERPL BCP-MCNC: 3.8 G/DL
ALP SERPL-CCNC: 60 U/L
ALT SERPL W/O P-5'-P-CCNC: 29 U/L
ANION GAP SERPL CALC-SCNC: 10 MMOL/L
AST SERPL-CCNC: 36 U/L
BASOPHILS # BLD AUTO: 0.05 K/UL
BASOPHILS NFR BLD: 0.8 %
BILIRUB SERPL-MCNC: 0.9 MG/DL
BUN SERPL-MCNC: 14 MG/DL
CALCIUM SERPL-MCNC: 9.6 MG/DL
CHLORIDE SERPL-SCNC: 100 MMOL/L
CO2 SERPL-SCNC: 24 MMOL/L
CREAT SERPL-MCNC: 1.1 MG/DL
DIFFERENTIAL METHOD: ABNORMAL
EOSINOPHIL # BLD AUTO: 0.2 K/UL
EOSINOPHIL NFR BLD: 3.6 %
ERYTHROCYTE [DISTWIDTH] IN BLOOD BY AUTOMATED COUNT: 14.5 %
EST. GFR  (AFRICAN AMERICAN): 56.8 ML/MIN/1.73 M^2
EST. GFR  (NON AFRICAN AMERICAN): 49.2 ML/MIN/1.73 M^2
ESTIMATED AVG GLUCOSE: 203 MG/DL
GLUCOSE SERPL-MCNC: 331 MG/DL
HBA1C MFR BLD HPLC: 8.7 %
HCT VFR BLD AUTO: 35.5 %
HGB BLD-MCNC: 11.4 G/DL
IMM GRANULOCYTES # BLD AUTO: 0.02 K/UL
IMM GRANULOCYTES NFR BLD AUTO: 0.3 %
LYMPHOCYTES # BLD AUTO: 2.2 K/UL
LYMPHOCYTES NFR BLD: 34.1 %
MAGNESIUM SERPL-MCNC: 1.5 MG/DL
MCH RBC QN AUTO: 28.5 PG
MCHC RBC AUTO-ENTMCNC: 32.1 G/DL
MCV RBC AUTO: 89 FL
MONOCYTES # BLD AUTO: 0.6 K/UL
MONOCYTES NFR BLD: 10 %
NEUTROPHILS # BLD AUTO: 3.2 K/UL
NEUTROPHILS NFR BLD: 51.2 %
NRBC BLD-RTO: 0 /100 WBC
PLATELET # BLD AUTO: 115 K/UL
PMV BLD AUTO: 12 FL
POTASSIUM SERPL-SCNC: 4.5 MMOL/L
PROT SERPL-MCNC: 7.4 G/DL
RBC # BLD AUTO: 4 M/UL
SODIUM SERPL-SCNC: 134 MMOL/L
T3FREE SERPL-MCNC: 2.6 PG/ML
T4 FREE SERPL-MCNC: 1.26 NG/DL
TSH SERPL DL<=0.005 MIU/L-ACNC: 4.51 UIU/ML
VIT B12 SERPL-MCNC: 349 PG/ML
WBC # BLD AUTO: 6.31 K/UL

## 2018-05-16 PROCEDURE — 85025 COMPLETE CBC W/AUTO DIFF WBC: CPT

## 2018-05-16 PROCEDURE — 36415 COLL VENOUS BLD VENIPUNCTURE: CPT | Mod: PO

## 2018-05-16 PROCEDURE — 84481 FREE ASSAY (FT-3): CPT

## 2018-05-16 PROCEDURE — 84439 ASSAY OF FREE THYROXINE: CPT

## 2018-05-16 PROCEDURE — 83735 ASSAY OF MAGNESIUM: CPT

## 2018-05-16 PROCEDURE — 80053 COMPREHEN METABOLIC PANEL: CPT

## 2018-05-16 PROCEDURE — 83036 HEMOGLOBIN GLYCOSYLATED A1C: CPT

## 2018-05-16 PROCEDURE — 84443 ASSAY THYROID STIM HORMONE: CPT

## 2018-05-16 PROCEDURE — 82607 VITAMIN B-12: CPT

## 2018-05-24 ENCOUNTER — HOSPITAL ENCOUNTER (INPATIENT)
Facility: HOSPITAL | Age: 73
LOS: 1 days | Discharge: HOME | DRG: 390 | End: 2018-05-25
Attending: EMERGENCY MEDICINE | Admitting: SURGERY
Payer: MEDICARE

## 2018-05-24 DIAGNOSIS — K56.600 PARTIAL SMALL BOWEL OBSTRUCTION: ICD-10-CM

## 2018-05-24 DIAGNOSIS — R11.0 NAUSEA: Primary | ICD-10-CM

## 2018-05-24 DIAGNOSIS — R10.9 ABDOMINAL PAIN, UNSPECIFIED ABDOMINAL LOCATION: ICD-10-CM

## 2018-05-24 LAB
ALBUMIN SERPL BCP-MCNC: 3.5 G/DL
ALLENS TEST: NORMAL
ALP SERPL-CCNC: 58 U/L
ALT SERPL W/O P-5'-P-CCNC: 25 U/L
ANION GAP SERPL CALC-SCNC: 12 MMOL/L
AST SERPL-CCNC: 37 U/L
BASOPHILS # BLD AUTO: 0.05 K/UL
BASOPHILS NFR BLD: 1 %
BILIRUB SERPL-MCNC: 1 MG/DL
BUN SERPL-MCNC: 16 MG/DL
BUN SERPL-MCNC: 16 MG/DL (ref 6–30)
CALCIUM SERPL-MCNC: 9.6 MG/DL
CHLORIDE SERPL-SCNC: 102 MMOL/L (ref 95–110)
CHLORIDE SERPL-SCNC: 103 MMOL/L
CO2 SERPL-SCNC: 21 MMOL/L
CREAT SERPL-MCNC: 0.8 MG/DL (ref 0.5–1.4)
CREAT SERPL-MCNC: 1 MG/DL
DIFFERENTIAL METHOD: ABNORMAL
EOSINOPHIL # BLD AUTO: 0.2 K/UL
EOSINOPHIL NFR BLD: 3.2 %
ERYTHROCYTE [DISTWIDTH] IN BLOOD BY AUTOMATED COUNT: 14.6 %
EST. GFR  (AFRICAN AMERICAN): >60 ML/MIN/1.73 M^2
EST. GFR  (NON AFRICAN AMERICAN): 55.2 ML/MIN/1.73 M^2
GLUCOSE SERPL-MCNC: 252 MG/DL (ref 70–110)
GLUCOSE SERPL-MCNC: 264 MG/DL
HCT VFR BLD AUTO: 34.3 %
HCT VFR BLD CALC: 33 %PCV (ref 36–54)
HGB BLD-MCNC: 10.9 G/DL
IMM GRANULOCYTES # BLD AUTO: 0.01 K/UL
IMM GRANULOCYTES NFR BLD AUTO: 0.2 %
INR PPP: 1
LACTATE SERPL-SCNC: 2.6 MMOL/L
LDH SERPL L TO P-CCNC: 1.77 MMOL/L (ref 0.5–2.2)
LIPASE SERPL-CCNC: 27 U/L
LYMPHOCYTES # BLD AUTO: 1.4 K/UL
LYMPHOCYTES NFR BLD: 26.9 %
MAGNESIUM SERPL-MCNC: 1.8 MG/DL
MCH RBC QN AUTO: 27.8 PG
MCHC RBC AUTO-ENTMCNC: 31.8 G/DL
MCV RBC AUTO: 88 FL
MONOCYTES # BLD AUTO: 0.6 K/UL
MONOCYTES NFR BLD: 11.1 %
NEUTROPHILS # BLD AUTO: 3 K/UL
NEUTROPHILS NFR BLD: 57.6 %
NRBC BLD-RTO: 0 /100 WBC
PLATELET # BLD AUTO: 115 K/UL
PMV BLD AUTO: 11.5 FL
POC IONIZED CALCIUM: 1.17 MMOL/L (ref 1.06–1.42)
POC TCO2 (MEASURED): 25 MMOL/L (ref 23–29)
POCT GLUCOSE: 107 MG/DL (ref 70–110)
POTASSIUM BLD-SCNC: 4.2 MMOL/L (ref 3.5–5.1)
POTASSIUM SERPL-SCNC: 4.9 MMOL/L
PROT SERPL-MCNC: 7.4 G/DL
PROTHROMBIN TIME: 10.9 SEC
RBC # BLD AUTO: 3.92 M/UL
SAMPLE: ABNORMAL
SAMPLE: NORMAL
SITE: NORMAL
SODIUM BLD-SCNC: 139 MMOL/L (ref 136–145)
SODIUM SERPL-SCNC: 136 MMOL/L
TROPONIN I SERPL DL<=0.01 NG/ML-MCNC: <0.006 NG/ML
WBC # BLD AUTO: 5.24 K/UL

## 2018-05-24 PROCEDURE — 82962 GLUCOSE BLOOD TEST: CPT

## 2018-05-24 PROCEDURE — 80053 COMPREHEN METABOLIC PANEL: CPT

## 2018-05-24 PROCEDURE — 84484 ASSAY OF TROPONIN QUANT: CPT

## 2018-05-24 PROCEDURE — 83605 ASSAY OF LACTIC ACID: CPT

## 2018-05-24 PROCEDURE — 83690 ASSAY OF LIPASE: CPT

## 2018-05-24 PROCEDURE — 25500020 PHARM REV CODE 255: Performed by: EMERGENCY MEDICINE

## 2018-05-24 PROCEDURE — 25000003 PHARM REV CODE 250: Performed by: STUDENT IN AN ORGANIZED HEALTH CARE EDUCATION/TRAINING PROGRAM

## 2018-05-24 PROCEDURE — 99285 EMERGENCY DEPT VISIT HI MDM: CPT | Mod: 25

## 2018-05-24 PROCEDURE — 25000003 PHARM REV CODE 250: Performed by: EMERGENCY MEDICINE

## 2018-05-24 PROCEDURE — 85610 PROTHROMBIN TIME: CPT

## 2018-05-24 PROCEDURE — 25500020 PHARM REV CODE 255: Performed by: STUDENT IN AN ORGANIZED HEALTH CARE EDUCATION/TRAINING PROGRAM

## 2018-05-24 PROCEDURE — 99285 EMERGENCY DEPT VISIT HI MDM: CPT | Mod: ,,, | Performed by: EMERGENCY MEDICINE

## 2018-05-24 PROCEDURE — 20600001 HC STEP DOWN PRIVATE ROOM

## 2018-05-24 PROCEDURE — 83735 ASSAY OF MAGNESIUM: CPT

## 2018-05-24 PROCEDURE — 85025 COMPLETE CBC W/AUTO DIFF WBC: CPT

## 2018-05-24 RX ORDER — RAMELTEON 8 MG/1
8 TABLET ORAL NIGHTLY PRN
Status: DISCONTINUED | OUTPATIENT
Start: 2018-05-24 | End: 2018-05-25 | Stop reason: HOSPADM

## 2018-05-24 RX ORDER — GLUCAGON 1 MG
1 KIT INJECTION
Status: DISCONTINUED | OUTPATIENT
Start: 2018-05-24 | End: 2018-05-25 | Stop reason: HOSPADM

## 2018-05-24 RX ORDER — INSULIN ASPART 100 [IU]/ML
0-5 INJECTION, SOLUTION INTRAVENOUS; SUBCUTANEOUS EVERY 6 HOURS PRN
Status: DISCONTINUED | OUTPATIENT
Start: 2018-05-24 | End: 2018-05-25 | Stop reason: HOSPADM

## 2018-05-24 RX ORDER — GABAPENTIN 300 MG/1
600 CAPSULE ORAL 2 TIMES DAILY
Status: DISCONTINUED | OUTPATIENT
Start: 2018-05-24 | End: 2018-05-25 | Stop reason: HOSPADM

## 2018-05-24 RX ORDER — ONDANSETRON 8 MG/1
8 TABLET, ORALLY DISINTEGRATING ORAL EVERY 8 HOURS PRN
Status: DISCONTINUED | OUTPATIENT
Start: 2018-05-24 | End: 2018-05-25 | Stop reason: HOSPADM

## 2018-05-24 RX ORDER — ACETAMINOPHEN 325 MG/1
650 TABLET ORAL EVERY 4 HOURS PRN
Status: DISCONTINUED | OUTPATIENT
Start: 2018-05-24 | End: 2018-05-25 | Stop reason: HOSPADM

## 2018-05-24 RX ORDER — MELOXICAM 7.5 MG/1
15 TABLET ORAL DAILY PRN
Status: DISCONTINUED | OUTPATIENT
Start: 2018-05-24 | End: 2018-05-25 | Stop reason: HOSPADM

## 2018-05-24 RX ORDER — SODIUM CHLORIDE, SODIUM LACTATE, POTASSIUM CHLORIDE, CALCIUM CHLORIDE 600; 310; 30; 20 MG/100ML; MG/100ML; MG/100ML; MG/100ML
INJECTION, SOLUTION INTRAVENOUS CONTINUOUS
Status: DISCONTINUED | OUTPATIENT
Start: 2018-05-24 | End: 2018-05-25 | Stop reason: HOSPADM

## 2018-05-24 RX ORDER — SODIUM CHLORIDE 0.9 % (FLUSH) 0.9 %
3 SYRINGE (ML) INJECTION
Status: DISCONTINUED | OUTPATIENT
Start: 2018-05-24 | End: 2018-05-25 | Stop reason: HOSPADM

## 2018-05-24 RX ORDER — PANTOPRAZOLE SODIUM 40 MG/1
40 TABLET, DELAYED RELEASE ORAL DAILY
Status: DISCONTINUED | OUTPATIENT
Start: 2018-05-25 | End: 2018-05-25 | Stop reason: HOSPADM

## 2018-05-24 RX ORDER — LEVOTHYROXINE SODIUM 88 UG/1
88 TABLET ORAL
Status: DISCONTINUED | OUTPATIENT
Start: 2018-05-25 | End: 2018-05-25 | Stop reason: HOSPADM

## 2018-05-24 RX ORDER — ONDANSETRON 8 MG/1
8 TABLET, ORALLY DISINTEGRATING ORAL
Status: COMPLETED | OUTPATIENT
Start: 2018-05-24 | End: 2018-05-24

## 2018-05-24 RX ORDER — DIPHENHYDRAMINE HYDROCHLORIDE 50 MG/ML
25 INJECTION INTRAMUSCULAR; INTRAVENOUS EVERY 4 HOURS PRN
Status: DISCONTINUED | OUTPATIENT
Start: 2018-05-24 | End: 2018-05-25 | Stop reason: HOSPADM

## 2018-05-24 RX ORDER — ATORVASTATIN CALCIUM 20 MG/1
40 TABLET, FILM COATED ORAL DAILY
Status: DISCONTINUED | OUTPATIENT
Start: 2018-05-25 | End: 2018-05-25 | Stop reason: HOSPADM

## 2018-05-24 RX ADMIN — DIATRIZOATE MEGLUMINE AND DIATRIZOATE SODIUM 50 ML: 660; 100 LIQUID ORAL; RECTAL at 10:05

## 2018-05-24 RX ADMIN — SODIUM CHLORIDE, SODIUM LACTATE, POTASSIUM CHLORIDE, AND CALCIUM CHLORIDE: .6; .31; .03; .02 INJECTION, SOLUTION INTRAVENOUS at 08:05

## 2018-05-24 RX ADMIN — IOHEXOL 75 ML: 350 INJECTION, SOLUTION INTRAVENOUS at 01:05

## 2018-05-24 RX ADMIN — SODIUM CHLORIDE 1000 ML: 0.9 INJECTION, SOLUTION INTRAVENOUS at 12:05

## 2018-05-24 RX ADMIN — SODIUM CHLORIDE 1000 ML: 0.9 INJECTION, SOLUTION INTRAVENOUS at 04:05

## 2018-05-24 RX ADMIN — ONDANSETRON 8 MG: 8 TABLET, ORALLY DISINTEGRATING ORAL at 12:05

## 2018-05-24 RX ADMIN — GABAPENTIN 600 MG: 300 CAPSULE ORAL at 08:05

## 2018-05-24 NOTE — NURSING
Patient provided nurse with her primary care physician details: Dr. Rocio Mc.  Cell Phone [phone], Office [phone]. DHARMESH MANN, Dr. Frederick salas.

## 2018-05-24 NOTE — CONSULTS
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 Ward with extensive TIFFANY (>2h) and multiple enterotomies who presents today with repeat pSBO.      Patient states that since last year she has been dealing with intermittent abdominal pain due to her scar tissue for which she has been seeing Dr. Erazo     Over the past week she has had poor PO tolerance.  Last BM was loose this morning which she thinks is related to her UTI abx which were completed today.    She is feeling generally fatigued.    This morning she started having nausea which is what brought her to the ER because symptoms were similar to those of prior obstructions    No current facility-administered medications on file prior to encounter.      Current Outpatient Prescriptions on File Prior to Encounter   Medication Sig    atorvastatin (LIPITOR) 40 MG tablet take 1 tablet by mouth once daily    biotin 5,000 mcg TbDL Take by mouth.    fluticasone (FLONASE) 50 mcg/actuation nasal spray 2 sprays by Each Nare route once daily.    gabapentin (NEURONTIN) 600 MG tablet Take 600 mg by mouth 2 (two) times daily.    levothyroxine (SYNTHROID) 88 MCG tablet Take 1 tablet (88 mcg total) by mouth before breakfast. Brand only synthroid    magnesium 30 mg Tab Take 500 capsules by mouth. Patient's taking magnesium 500mg QD    meloxicam (MOBIC) 15 MG tablet Take 1 tablet (15 mg total) by mouth daily as needed for Pain. With food for arthritis    metFORMIN (GLUCOPHAGE-XR) 750 MG 24 hr tablet take 1  tablet by mouth twice a day with meals    nitrofurantoin, macrocrystal-monohydrate, (MACROBID) 100 MG capsule Take 1 capsule (100 mg total) by mouth 2 (two) times daily.    ospemifene (OSPHENA) 60 mg Tab Take 60 mg by mouth once daily.    pantoprazole (PROTONIX) 40 MG tablet Take 1 tablet (40 mg total) by mouth once daily.    polyethylene glycol (GLYCOLAX) 17 gram/dose powder Take 17 g by mouth once daily.    vitamin D 1000 units Tab Take 185 mg by mouth once daily. D3    atorvastatin (LIPITOR) 40 MG tablet Take 1 tablet by mouth once daily.    azelastine (ASTELIN) 137 mcg nasal spray 1 spray (137 mcg total) by Nasal route 2 (two) times daily.    blood sugar diagnostic Strp Dispense Overdog contour strips; test blood sugar once daily    blood-glucose meter (CONTOUR METER) kit Please dispense CastingDB Contour meter and supplies Use as instructed Test Blood sugar once daily    cyanocobalamin 1,000 mcg/mL injection 1,000 mcg.    diphenhydrAMINE (BENADRYL) 25 mg capsule Take 1 each (25 mg total) by mouth nightly as needed for Itching, Allergies or Insomnia.    DUREZOL 0.05 % Drop ophthalmic solution instill 1 to 2 drops INTO SURGERY EYE twice a day STARTING AFTER SURGERY    hydrocortisone 2.5 % cream     ipratropium (ATROVENT) 0.06 % nasal spray     lancets (ONE TOUCH ULTRASOFT LANCETS) Misc Test blood sugar once daily    metronidazole 0.75% (METROCREAM) 0.75 % Crea     ondansetron (ZOFRAN) 8 MG tablet Take 1 tablet (8 mg total) by mouth every 8 (eight) hours as needed for Nausea.    PROLENSA 0.07 % Drop     VIT A/VIT C/VIT E/ZINC/COPPER (ICAPS AREDS ORAL) Take by mouth 2 (two) times daily.       Review of patient's allergies indicates:   Allergen Reactions    Percocet  [oxycodone-acetaminophen]      Other reaction(s): Itching    Sulfa (sulfonamide antibiotics)      Other reaction(s): Nausea  Other reaction(s): Itching    Adhesive Rash       Past Medical History:   Diagnosis Date    Allergic rhinitis      Arthritis     Blood transfusion     during delivery and     Bowel obstruction     Cervical radiculopathy     followed by dr cloud    Colon cancer     transverse colon; resected; Stage IIA (pT3 pN0 MX)    Diarrhea     Family history of breast cancer     Family history of colon cancer     Fatty liver     GERD (gastroesophageal reflux disease)     History of shingles     Hyperlipidemia     Hypothyroidism     Irritable bowel syndrome     Microscopic colitis     treated     Raynaud phenomenon     Raynaud's disease     Type 2 diabetes mellitus      Past Surgical History:   Procedure Laterality Date    APPENDECTOMY      BACK SURGERY      CARPAL TUNNEL RELEASE      bilateral      SECTION      CHOLECYSTECTOMY  1965    COLECTOMY  2011    Transverse colon resection by Dr. Aguirre    COLONOSCOPY N/A 2017    Procedure: COLONOSCOPY;  Surgeon: Manjit Alvarez MD;  Location: [facility] ([address] [place] [address]);  Service: Endoscopy;  Laterality: N/A;    EYE SURGERY      Cataract Removal    HYSTERECTOMY      posterolateral fusion with autograft bone and [place] mineralized bone matrix  13    at [facility] for lumbar spine stenosis    TOE SURGERY      TONSILLECTOMY      TRIGGER FINGER RELEASE       Family History     Problem Relation (Age of Onset)    Alcohol abuse Brother, Brother    Arthritis Daughter, Brother    Asthma Daughter    Bladder Cancer Mother    Breast cancer Sister (79)    Cataracts Mother    Colon cancer Father, Brother (70)    Depression Daughter, Daughter    Glaucoma Mother    Heart disease Father (50), Mother    Hyperlipidemia Mother    Hypertension Daughter    Kidney disease Mother    Parkinsonism Brother    Stroke Daughter (40)        Social History Main Topics    Smoking status: Never Smoker    Smokeless tobacco: Never Used    Alcohol use 3.0 oz/week     5 Glasses of wine per week      Comment: socially    Drug use: No    Sexual activity: No     Review of  Systems   10 pt Negative except as above  Notable positive fatigue, N/V, minimal abdominal pain-stable  Objective:     Vital Signs (Most Recent):  Temp: 98.3 °F (36.8 °C) (05/24/18 1203)  Pulse: 62 (05/24/18 1516)  Resp: 12 (05/24/18 1415)  BP: (!) 115/57 (05/24/18 1516)  SpO2: 98 % (05/24/18 1516) Vital Signs (24h Range):  Temp:  [97.9 °F (36.6 °C)-98.3 °F (36.8 °C)] 98.3 °F (36.8 °C)  Pulse:  [60-70] 62  Resp:  [12-18] 12  SpO2:  [96 %-98 %] 98 %  BP: ()/(52-62) 115/57     Weight: 68 kg (150 lb)  Body mass index is 27.44 kg/m².    No intake or output data in the 24 hours ending 05/24/18 1600    Physical Exam   Gen: NAD  CV: RRR  Pulm: unlabored  GI: Soft, NT, ND, well healed scars  Ext: no edema  Neuro: non-focal  Skin: no rashes    Significant Labs:  CBC:   Recent Labs  Lab 05/24/18  1215 05/24/18  1240   WBC 5.24  --    RBC 3.92*  --    HGB 10.9*  --    HCT 34.3* 33*   *  --    MCV 88  --    MCH 27.8  --    MCHC 31.8*  --      CMP:   Recent Labs  Lab 05/24/18  1215   *   CALCIUM 9.6   ALBUMIN 3.5   PROT 7.4      K 4.9   CO2 21*      BUN 16   CREATININE 1.0   ALKPHOS 58   ALT 25   AST 37   BILITOT 1.0     Coagulation:   Recent Labs  Lab 05/24/18  1215   INR 1.0     Lactic Acid:   Recent Labs  Lab 05/24/18  1215   LACTATE 2.6*       Significant Diagnostics:  CT reviewed: Small area of small bowel dilation at anastomotic site. No significant proximal dilation     Assessment/Plan:     Active Diagnoses:    Diagnosis Date Noted POA    Partial small bowel obstruction [K56.600] 09/01/2014 Yes      Problems Resolved During this Admission:    Diagnosis Date Noted Date Resolved POA     72 y/o F with dehydration, poor PO tolerance, mild hypotension, fatigue, and likely chronic dilation of small bowel anastomotic site. Review of prior CTs shows that this area is likely improved from prior. Given other symptoms and relatively new onset nausea, early pSBO cannot be excluded    Will admit to  obs  NPO, sparing ice chips   IVF  Gastrografin challenge, KUB in the a.m.  Trend labs    Thank you for your consult. Will admit    Emiliano Rodriguez MD  General Surgery  [facility]     I have personally performed a detailed history and physical examination on this patient. My findings are summarized in the resident's note included in the record.

## 2018-05-24 NOTE — ED NOTES
Pt. Resting in bed; no acute distress noted; IV line flushed and arm repositioned in order to fluids to flow faster.

## 2018-05-24 NOTE — ED PROVIDER NOTES
Encounter Date: 2018    SCRIBE #1 NOTE: IMile, louis scribing for, and in the presence of, Dr. Mack.       History     Chief Complaint   Patient presents with    Abdominal Pain     with nausea. Hx of small bowel obstuctions.      This is a 73 y.o. female with co-morbidities including GERD, HLD, hypothyroidism, IBS, bowel obstruction, colon cancer and DM type 2 who presents to the ED with a chief complaint of intermittent abdominal pain that became sharper and more frequent x 1 day. Patient additionally endorses severe nausea, diarrhea. Denies vomit, flatulence. Patient has a history of SBO, with 2 exploratory laparotomies done in  and , multiple enterotomies, repeat pSBO requiring surgery on 18. PSHx also significant for appendectomy, cholecystectomy, colectomy and colonoscopy. Patient states she takes a metabosol every morning, flexeril since surgery on 18.       The history is provided by the patient and medical records.     Review of patient's allergies indicates:   Allergen Reactions    Percocet  [oxycodone-acetaminophen]      Other reaction(s): Itching    Sulfa (sulfonamide antibiotics)      Other reaction(s): Nausea  Other reaction(s): Itching    Adhesive Rash     Past Medical History:   Diagnosis Date    Allergic rhinitis     Arthritis     Blood transfusion     during delivery and     Bowel obstruction     Cervical radiculopathy     followed by dr cloud    Colon cancer     transverse colon; resected; Stage IIA (pT3 pN0 MX)    Diarrhea     Family history of breast cancer     Family history of colon cancer     Fatty liver     GERD (gastroesophageal reflux disease)     History of shingles     Hyperlipidemia     Hypothyroidism     Irritable bowel syndrome     Microscopic colitis     treated     Raynaud phenomenon     Raynaud's disease     Type 2 diabetes mellitus      Past Surgical History:   Procedure Laterality Date    APPENDECTOMY       BACK SURGERY      CARPAL TUNNEL RELEASE      bilateral      SECTION      CHOLECYSTECTOMY  1965    COLECTOMY  2011    Transverse colon resection by Dr. Aguirre    COLONOSCOPY N/A 2017    Procedure: COLONOSCOPY;  Surgeon: Manjit Alvarez MD;  Location: [facility] ([address] [place] [address]);  Service: Endoscopy;  Laterality: N/A;    EYE SURGERY      Cataract Removal    HYSTERECTOMY      posterolateral fusion with autograft bone and [place] mineralized bone matrix  13    at [facility] for lumbar spine stenosis    TOE SURGERY      TONSILLECTOMY      TRIGGER FINGER RELEASE       Family History   Problem Relation Age of Onset    Heart disease Father 50        Mi age 50    Colon cancer Father     Bladder Cancer Mother         non smoker    Cataracts Mother     Glaucoma Mother     Heart disease Mother     Hyperlipidemia Mother     Kidney disease Mother     Breast cancer Sister 79    Arthritis Daughter     Asthma Daughter     Depression Daughter     Hypertension Daughter     Stroke Daughter 40    Arthritis Brother     Colon cancer Brother 70    Alcohol abuse Brother     Parkinsonism Brother     Alcohol abuse Brother     Depression Daughter     Celiac disease Neg Hx     Cirrhosis Neg Hx     Colon polyps Neg Hx     Crohn's disease Neg Hx     Cystic fibrosis Neg Hx     Esophageal cancer Neg Hx     Hemochromatosis Neg Hx     Inflammatory bowel disease Neg Hx     Irritable bowel syndrome Neg Hx     Liver cancer Neg Hx     Liver disease Neg Hx     Rectal cancer Neg Hx     Stomach cancer Neg Hx     Ulcerative colitis Neg Hx     Eliazar's disease Neg Hx     Amblyopia Neg Hx     Blindness Neg Hx     Macular degeneration Neg Hx     Retinal detachment Neg Hx     Strabismus Neg Hx     Melanoma Neg Hx      Social History   Substance Use Topics    Smoking status: Never Smoker    Smokeless tobacco: Never Used    Alcohol use 3.0 oz/week     5 Glasses of wine per week      Comment:  socially     Review of Systems   Constitutional: Negative for chills, diaphoresis and fever.   HENT: Negative for congestion.    Eyes: Negative for photophobia and visual disturbance.   Respiratory: Negative for cough and shortness of breath.    Cardiovascular: Negative for chest pain.   Gastrointestinal: Positive for abdominal pain, diarrhea and nausea. Negative for vomiting.   Musculoskeletal: Negative for arthralgias and back pain.   Skin: Negative for rash.   Neurological: Negative for dizziness, light-headedness and headaches.   Psychiatric/Behavioral: Negative for behavioral problems.       Physical Exam     Initial Vitals [05/24/18 1122]   BP Pulse Resp Temp SpO2   131/62 70 18 97.9 °F (36.6 °C) 98 %      MAP       85         Physical Exam    Nursing note and vitals reviewed.  Constitutional: She appears well-developed and well-nourished. No distress.   HENT:   Head: Normocephalic and atraumatic.   Mouth/Throat: Oropharynx is clear and moist.   Eyes: Conjunctivae are normal.   Neck: Normal range of motion.   Cardiovascular: Normal rate, regular rhythm and normal heart sounds.   Pulmonary/Chest: Breath sounds normal. No respiratory distress.   Abdominal: Soft. She exhibits no distension. There is tenderness in the right upper quadrant and epigastric area.    Multiple well-healed abdominal scars.   Musculoskeletal: Normal range of motion.   Neurological: She is alert and oriented to person, place, and time.   Skin: Skin is warm and dry.         ED Course   Procedures  Labs Reviewed   COMPREHENSIVE METABOLIC PANEL - Abnormal; Notable for the following:        Result Value    CO2 21 (*)     Glucose 264 (*)     eGFR if non  55.2 (*)     All other components within normal limits   CBC W/ AUTO DIFFERENTIAL - Abnormal; Notable for the following:     RBC 3.92 (*)     Hemoglobin 10.9 (*)     Hematocrit 34.3 (*)     MCHC 31.8 (*)     RDW 14.6 (*)     Platelets 115 (*)     All other components within  normal limits   LACTIC ACID, PLASMA - Abnormal; Notable for the following:     Lactate (Lactic Acid) 2.6 (*)     All other components within normal limits   ISTAT PROCEDURE - Abnormal; Notable for the following:     POC Glucose 252 (*)     POC Hematocrit 33 (*)     All other components within normal limits   MAGNESIUM   LIPASE   PROTIME-INR   TROPONIN I   ISTAT LACTATE   POCT GLUCOSE   ISTAT CHEM8   POCT GLUCOSE MONITORING CONTINUOUS          X-Rays:   Independently Interpreted Readings:   Abdomen:   Abdomen and Pelvis CT with Contrast - Some changes that are likely consistent with prior.     Medical Decision Making:   History:   Old Medical Records: I decided to obtain old medical records.  Initial Assessment:   Emergent evaluation of abdominal pain. Patient has multiple risk factors and prior obstructions.   Differential Diagnosis:   Initial concerns include obstruction, colitis, bladder infection, electrolyte abnormality.  Independently Interpreted Test(s):   I have ordered and independently interpreted X-rays - see prior notes.  Clinical Tests:   Lab Tests: Ordered and Reviewed  Radiological Study: Ordered and Reviewed  ED Management:  Will check labs, control nausea, give IV fluids and get CT imaging to r/o acute intraabdominal process.   Other:   I have discussed this case with another health care provider.       <> Summary of the Discussion: General Surgery            Scribe Attestation:   Scribe #1: I performed the above scribed service and the documentation accurately describes the services I performed. I attest to the accuracy of the note.    Attending Attestation:             Attending ED Notes:   1:49 PM CT scans shows some changes that are likely consistent with prior, have consulted General Surgery to review the scans. Patient reports that she is feeling better.     3:45 PM  Patient evaluated by general surgery, will admit for bowel rest.              Clinical Impression:   The primary encounter  diagnosis was Nausea. Diagnoses of Abdominal pain, unspecified abdominal location and Partial small bowel obstruction were also pertinent to this visit.    Disposition:   Disposition: Admitted  Condition: Anita Mack MD  05/24/18 1095

## 2018-05-24 NOTE — ED TRIAGE NOTES
Patient received with complaint of abdominal pain and nausea; pt. Reports she has a history of blockages in her lower intestines; pt. Reports chronic abdominal pain that has become sharper and more frequent within the past day; pt. Reports recent urinary tract infection and reports diarrhea today.     No LDA's in place on arrival to department.    No family present.    Pain:  Rated 5/10.    Psychosocial:  Patient is calm and cooperative.  Patients insight and judgement are appropriate to situation.  Appears clean, well maintained, with clothing appropriate to environment.  No evidence of hallucinations, delusions, or psychosis.    Neuro:  Eyes open spontaneously.  Awake, alert.  Oriented x 4.  Speech clear and appropriate.  Tolerating saliva secretions well.  Able to follow commands, demonstrating ability to actively and appropriately communicate within context of current conversation.  Symmetrical facial muscles.     Airway:  Bilateral chest rise and fall.  RR regular and non labored.  Air entry patent.    Circulatory:  Skin warm, dry, and pink.  Apical and radial pulses strong and regular.      Abdomen:  Reports right sided and middle abdominal pain.    Urinary:  Patient reports routine urination without pain, frequency, or urgency.      Extremities:  No redness, heat, swelling, deformity, or pain.    Skin:  Intact with n o bruising/discolorations noted.

## 2018-05-24 NOTE — PROGRESS NOTES
Received report from Yelena BABCOCK in ER.  Patient has second bolus of LR being administered at present time.

## 2018-05-25 VITALS
DIASTOLIC BLOOD PRESSURE: 60 MMHG | TEMPERATURE: 98 F | RESPIRATION RATE: 16 BRPM | SYSTOLIC BLOOD PRESSURE: 123 MMHG | BODY MASS INDEX: 27.44 KG/M2 | HEART RATE: 56 BPM | OXYGEN SATURATION: 99 % | WEIGHT: 150 LBS

## 2018-05-25 PROBLEM — R11.0 NAUSEA: Status: ACTIVE | Noted: 2018-05-25

## 2018-05-25 LAB
ANION GAP SERPL CALC-SCNC: 8 MMOL/L
BASOPHILS # BLD AUTO: 0.03 K/UL
BASOPHILS NFR BLD: 0.7 %
BUN SERPL-MCNC: 11 MG/DL
CALCIUM SERPL-MCNC: 8.7 MG/DL
CHLORIDE SERPL-SCNC: 112 MMOL/L
CO2 SERPL-SCNC: 22 MMOL/L
CREAT SERPL-MCNC: 0.7 MG/DL
DIFFERENTIAL METHOD: ABNORMAL
EOSINOPHIL # BLD AUTO: 0.2 K/UL
EOSINOPHIL NFR BLD: 3.9 %
ERYTHROCYTE [DISTWIDTH] IN BLOOD BY AUTOMATED COUNT: 14.6 %
EST. GFR  (AFRICAN AMERICAN): >60 ML/MIN/1.73 M^2
EST. GFR  (NON AFRICAN AMERICAN): >60 ML/MIN/1.73 M^2
GLUCOSE SERPL-MCNC: 131 MG/DL
HCT VFR BLD AUTO: 31.6 %
HGB BLD-MCNC: 9.8 G/DL
IMM GRANULOCYTES # BLD AUTO: 0.01 K/UL
IMM GRANULOCYTES NFR BLD AUTO: 0.2 %
LACTATE SERPL-SCNC: 1.1 MMOL/L
LYMPHOCYTES # BLD AUTO: 1.7 K/UL
LYMPHOCYTES NFR BLD: 38.8 %
MAGNESIUM SERPL-MCNC: 1.2 MG/DL
MCH RBC QN AUTO: 28.2 PG
MCHC RBC AUTO-ENTMCNC: 31 G/DL
MCV RBC AUTO: 91 FL
MONOCYTES # BLD AUTO: 0.5 K/UL
MONOCYTES NFR BLD: 10.3 %
NEUTROPHILS # BLD AUTO: 2 K/UL
NEUTROPHILS NFR BLD: 46.1 %
NRBC BLD-RTO: 0 /100 WBC
PHOSPHATE SERPL-MCNC: 3.8 MG/DL
PLATELET # BLD AUTO: 90 K/UL
PMV BLD AUTO: 11.4 FL
POCT GLUCOSE: 101 MG/DL (ref 70–110)
POTASSIUM SERPL-SCNC: 3.8 MMOL/L
RBC # BLD AUTO: 3.48 M/UL
SODIUM SERPL-SCNC: 142 MMOL/L
WBC # BLD AUTO: 4.36 K/UL

## 2018-05-25 PROCEDURE — 85025 COMPLETE CBC W/AUTO DIFF WBC: CPT

## 2018-05-25 PROCEDURE — 99223 1ST HOSP IP/OBS HIGH 75: CPT | Mod: AI,,, | Performed by: SURGERY

## 2018-05-25 PROCEDURE — 36415 COLL VENOUS BLD VENIPUNCTURE: CPT

## 2018-05-25 PROCEDURE — 80048 BASIC METABOLIC PNL TOTAL CA: CPT

## 2018-05-25 PROCEDURE — 84100 ASSAY OF PHOSPHORUS: CPT

## 2018-05-25 PROCEDURE — 25000003 PHARM REV CODE 250: Performed by: STUDENT IN AN ORGANIZED HEALTH CARE EDUCATION/TRAINING PROGRAM

## 2018-05-25 PROCEDURE — 83605 ASSAY OF LACTIC ACID: CPT

## 2018-05-25 PROCEDURE — 83735 ASSAY OF MAGNESIUM: CPT

## 2018-05-25 RX ORDER — FLUTICASONE PROPIONATE 50 MCG
2 SPRAY, SUSPENSION (ML) NASAL DAILY
Status: DISCONTINUED | OUTPATIENT
Start: 2018-05-25 | End: 2018-05-25 | Stop reason: HOSPADM

## 2018-05-25 RX ORDER — ATORVASTATIN CALCIUM 20 MG/1
40 TABLET, FILM COATED ORAL DAILY
Status: DISCONTINUED | OUTPATIENT
Start: 2018-05-25 | End: 2018-05-25

## 2018-05-25 RX ORDER — AZELASTINE 1 MG/ML
1 SPRAY, METERED NASAL 2 TIMES DAILY
Status: DISCONTINUED | OUTPATIENT
Start: 2018-05-25 | End: 2018-05-25 | Stop reason: HOSPADM

## 2018-05-25 RX ORDER — DIFLUPREDNATE OPHTHALMIC 0.5 MG/ML
1 EMULSION OPHTHALMIC DAILY
Status: DISCONTINUED | OUTPATIENT
Start: 2018-05-25 | End: 2018-05-25 | Stop reason: HOSPADM

## 2018-05-25 RX ADMIN — PANTOPRAZOLE SODIUM 40 MG: 40 TABLET, DELAYED RELEASE ORAL at 09:05

## 2018-05-25 RX ADMIN — LEVOTHYROXINE SODIUM 88 MCG: 88 TABLET ORAL at 05:05

## 2018-05-25 RX ADMIN — GABAPENTIN 600 MG: 300 CAPSULE ORAL at 09:05

## 2018-05-25 RX ADMIN — ATORVASTATIN CALCIUM 40 MG: 20 TABLET, FILM COATED ORAL at 09:05

## 2018-05-25 NOTE — SUBJECTIVE & OBJECTIVE
Interval History: AFVSS.  Multiple BMs after gastrograffin challenge yesterday.  KUB with contrast throughout colon.  Feels hungry this morning.    Medications:  Continuous Infusions:   lactated ringers 125 mL/hr at 05/24/18 2059     Scheduled Meds:   atorvastatin  40 mg Oral Daily    gabapentin  600 mg Oral BID    levothyroxine  88 mcg Oral Before breakfast    pantoprazole  40 mg Oral Daily     PRN Meds:acetaminophen, dextrose 50%, diphenhydrAMINE, glucagon (human recombinant), insulin aspart U-100, meloxicam, ondansetron, ramelteon, sodium chloride 0.9%     Review of patient's allergies indicates:   Allergen Reactions    Percocet  [oxycodone-acetaminophen]      Other reaction(s): Itching    Sulfa (sulfonamide antibiotics)      Other reaction(s): Nausea  Other reaction(s): Itching    Adhesive Rash     Objective:     Vital Signs (Most Recent):  Temp: 97.6 °F (36.4 °C) (05/25/18 0354)  Pulse: 70 (05/25/18 0354)  Resp: 16 (05/25/18 0354)  BP: (!) 113/53 (05/25/18 0354)  SpO2: 95 % (05/25/18 0354) Vital Signs (24h Range):  Temp:  [97.6 °F (36.4 °C)-98.3 °F (36.8 °C)] 97.6 °F (36.4 °C)  Pulse:  [56-70] 70  Resp:  [12-20] 16  SpO2:  [95 %-98 %] 95 %  BP: ()/(52-63) 113/53     Weight: 68 kg (150 lb)  Body mass index is 27.44 kg/m².    Intake/Output - Last 3 Shifts       05/23 0700 - 05/24 0659 05/24 0700 - 05/25 0659 05/25 0700 - 05/26 0659    P.O.  170     I.V. (mL/kg)  1002.1 (14.7)     Total Intake(mL/kg)  1172.1 (17.2)     Net   +1172.1             Urine Occurrence  4 x     Stool Occurrence  0 x     Emesis Occurrence  0 x           Physical Exam   Constitutional: She is oriented to person, place, and time. She appears well-developed and well-nourished.   HENT:   Head: Normocephalic and atraumatic.   Eyes: EOM are normal. Pupils are equal, round, and reactive to light.   Cardiovascular: Normal rate, regular rhythm and normal heart sounds.  Exam reveals no gallop and no friction rub.    No murmur  heard.  Pulmonary/Chest: Effort normal and breath sounds normal. No respiratory distress. She has no wheezes. She has no rales.   Abdominal: Soft. Bowel sounds are normal. She exhibits no distension and no mass. There is no tenderness. There is no rebound and no guarding. No hernia.   Neurological: She is alert and oriented to person, place, and time.   Skin: Skin is warm and dry.   Psychiatric: She has a normal mood and affect. Her behavior is normal.   Nursing note and vitals reviewed.      Significant Labs:  Recent Labs      05/25/18   0528   WBC  4.36   HGB  9.8*   HCT  31.6*   PLT  90*     Recent Labs      05/24/18   1215  05/25/18   0528   NA  136  142   K  4.9  3.8   CL  103  112*   CO2  21*  22*   BUN  16  11   CREATININE  1.0  0.7   PROT  7.4   --    ALBUMIN  3.5   --    BILITOT  1.0   --    AST  37   --    ALKPHOS  58   --    ALT  25   --            Significant Diagnostics:  KUB reviewed -- contrast in colon and rectum

## 2018-05-25 NOTE — PLAN OF CARE
05/25/18 1227   Final Note   Assessment Type Final Discharge Note   Discharge Disposition Home   What phone number can be called within the next 1-3 days to see how you are doing after discharge? ([phone])   Hospital Follow Up  Appt(s) scheduled? Yes   Discharge plans and expectations educations in teach back method with documentation complete? Yes   Right Care Referral Info   Post Acute Recommendation (Incomplete)

## 2018-05-25 NOTE — PROGRESS NOTES
[facility]  General Surgery  Progress Note    Subjective:     History of Present Illness:  No notes on file    Post-Op Info:  * No surgery found *         Interval History: AFVSS.  Multiple BMs after gastrograffin challenge yesterday.  KUB with contrast throughout colon.  Feels hungry this morning.    Medications:  Continuous Infusions:   lactated ringers 125 mL/hr at 05/24/18 2059     Scheduled Meds:   atorvastatin  40 mg Oral Daily    gabapentin  600 mg Oral BID    levothyroxine  88 mcg Oral Before breakfast    pantoprazole  40 mg Oral Daily     PRN Meds:acetaminophen, dextrose 50%, diphenhydrAMINE, glucagon (human recombinant), insulin aspart U-100, meloxicam, ondansetron, ramelteon, sodium chloride 0.9%     Review of patient's allergies indicates:   Allergen Reactions    Percocet  [oxycodone-acetaminophen]      Other reaction(s): Itching    Sulfa (sulfonamide antibiotics)      Other reaction(s): Nausea  Other reaction(s): Itching    Adhesive Rash     Objective:     Vital Signs (Most Recent):  Temp: 97.6 °F (36.4 °C) (05/25/18 0354)  Pulse: 70 (05/25/18 0354)  Resp: 16 (05/25/18 0354)  BP: (!) 113/53 (05/25/18 0354)  SpO2: 95 % (05/25/18 0354) Vital Signs (24h Range):  Temp:  [97.6 °F (36.4 °C)-98.3 °F (36.8 °C)] 97.6 °F (36.4 °C)  Pulse:  [56-70] 70  Resp:  [12-20] 16  SpO2:  [95 %-98 %] 95 %  BP: ()/(52-63) 113/53     Weight: 68 kg (150 lb)  Body mass index is 27.44 kg/m².    Intake/Output - Last 3 Shifts       05/23 0700 - 05/24 0659 05/24 0700 - 05/25 0659 05/25 0700 - 05/26 0659    P.O.  170     I.V. (mL/kg)  1002.1 (14.7)     Total Intake(mL/kg)  1172.1 (17.2)     Net   +1172.1             Urine Occurrence  4 x     Stool Occurrence  0 x     Emesis Occurrence  0 x           Physical Exam   Constitutional: She is oriented to person, place, and time. She appears well-developed and well-nourished.   HENT:   Head: Normocephalic and atraumatic.   Eyes: EOM are normal. Pupils are  equal, round, and reactive to light.   Cardiovascular: Normal rate, regular rhythm and normal heart sounds.  Exam reveals no gallop and no friction rub.    No murmur heard.  Pulmonary/Chest: Effort normal and breath sounds normal. No respiratory distress. She has no wheezes. She has no rales.   Abdominal: Soft. Bowel sounds are normal. She exhibits no distension and no mass. There is no tenderness. There is no rebound and no guarding. No hernia.   Neurological: She is alert and oriented to person, place, and time.   Skin: Skin is warm and dry.   Psychiatric: She has a normal mood and affect. Her behavior is normal.   Nursing note and vitals reviewed.      Significant Labs:  Recent Labs      05/25/18   0528   WBC  4.36   HGB  9.8*   HCT  31.6*   PLT  90*     Recent Labs      05/24/18   1215  05/25/18   0528   NA  136  142   K  4.9  3.8   CL  103  112*   CO2  21*  22*   BUN  16  11   CREATININE  1.0  0.7   PROT  7.4   --    ALBUMIN  3.5   --    BILITOT  1.0   --    AST  37   --    ALKPHOS  58   --    ALT  25   --            Significant Diagnostics:  KUB reviewed -- contrast in colon and rectum    Assessment/Plan:     Partial small bowel obstruction    Resolved/resolving  Regular diet  Possible d/c later today if tolerating diet  Home meds            Jonathan Schoen, MD  General Surgery  [facility]

## 2018-05-25 NOTE — PLAN OF CARE
Problem: Patient Care Overview  Goal: Plan of Care Review  Outcome: Ongoing (interventions implemented as appropriate)  Plan of care reviewed with the patient, a 73 year old female with the DX of partial small bowel obstruction/abdominal pain,,, admitted on the 24th of this month,, xray in the am,, contrast drank tonight with more ordered in the AM,,,,, type II diabetes,,, BS well within limits all night,,, had many trips to the bathroom last night,, a bedside commode chair was ordered to lessen the distance,,,, uneventful night, rested nicely,,, bed locked and low call light in reach,,,,,

## 2018-05-25 NOTE — PLAN OF CARE
12:27 PM Patient lives alone in a 7th floor condominium w/elevator access. Discharged to home, already , without needs.        05/25/18 1227   Discharge Assessment   Assessment Type Discharge Planning Assessment   Confirmed/corrected address and phone number on facesheet? Yes   Assessment information obtained from? Medical Record   Expected Length of Stay (days) (1)   Communicated expected length of stay with patient/caregiver no  (Per MD)   Prior to hospitilization cognitive status: Alert/Oriented;No Deficits   Prior to hospitalization functional status: Independent   Current cognitive status: Alert/Oriented;No Deficits   Current Functional Status: Independent   Facility Arrived From: (N/A)   Lives With alone   Able to Return to Prior Arrangements yes   Is patient able to care for self after discharge? Yes   Who are your caregiver(s) and their phone number(s)? (Daughters will help her as needs: 1. Kimberly SHELDON [phone], 2. Danielle SHELDON  [phone] no # given. )   Patient's perception of discharge disposition home or selfcare   Readmission Within The Last 30 Days no previous admission in last 30 days   Patient currently being followed by outpatient case management? No   Patient currently receives any other outside agency services? No   Equipment Currently Used at Home none   Do you have any problems affording any of your prescribed medications? No   Is the patient taking medications as prescribed? yes   Does the patient have transportation home? Yes   Transportation Available car;family or friend will provide   Dialysis Name and Scheduled days (N/a)   Does the patient receive services at the Coumadin Clinic? No   Discharge Plan A Home with family   Discharge Plan B Home with family   Patient/Family In Agreement With Plan yes  (Per floor nurse & MD)

## 2018-05-25 NOTE — DISCHARGE SUMMARY
[facility]  General Surgery  Discharge Summary      Patient Name: Anayeli Judd  MRN: [id number]  Admission Date: 5/24/2018  Hospital Length of Stay: 1 days  Discharge Date and Time:  05/25/2018 7:51 AM  Attending Physician: Herman Fletcher MD   Discharging Provider: Emiliano Rodriguez MD  Primary Care Provider: Rocio Mc MD     HPI: Pt presented with pSBO and dehydration.  She was resuscitated and had a successful gastrografin challenge.  Tolerating PO at baseline and having bowel function.  Stable for DC    * No surgery found *     Hospital Course: As above    Consults:   Consults         Status Ordering Provider     Inpatient consult to General surgery  Once     Provider:  (Not yet assigned)    Completed LEONORA TALBERT          Significant Diagnostic Studies: Labs:   BMP:   Recent Labs  Lab 05/24/18 1215 05/25/18 0528   * 131*    142   K 4.9 3.8    112*   CO2 21* 22*   BUN 16 11   CREATININE 1.0 0.7   CALCIUM 9.6 8.7   MG 1.8 1.2*    and CBC   Recent Labs  Lab 05/24/18 1215  05/25/18 0528   WBC 5.24  --  4.36   HGB 10.9*  --  9.8*   HCT 34.3*  < > 31.6*   *  --  90*   < > = values in this interval not displayed.    Pending Diagnostic Studies:     Procedure Component Value Units Date/Time    FL Upper GI Water Soluble Without KUB [[id number]]     Order Status:  Sent Lab Status:  No result     X-Ray Abdomen AP 1 View [[id number]] Resulted:  05/25/18 0528    Order Status:  Sent Lab Status:  In process Updated:  05/25/18 0657        Final Active Diagnoses:    Diagnosis Date Noted POA    PRINCIPAL PROBLEM:  Partial small bowel obstruction [K56.600] 09/01/2014 Yes    Nausea [R11.0] 05/25/2018 Yes      Problems Resolved During this Admission:    Diagnosis Date Noted Date Resolved POA      Discharged Condition: good    Disposition:     Follow Up:  Follow-up Information     Herman Fletcher MD.    Specialties:  General Surgery, Surgery  Why:  As  needed  Contact information:  [address]  [facility] [address]  [phone]                 Patient Instructions:     Activity as tolerated     Notify your health care provider if you experience any of the following:  increased confusion or weakness     Notify your health care provider if you experience any of the following:  persistent dizziness, light-headedness, or visual disturbances     Notify your health care provider if you experience any of the following:  worsening rash     Notify your health care provider if you experience any of the following:  severe persistent headache     Notify your health care provider if you experience any of the following:  difficulty breathing or increased cough     Notify your health care provider if you experience any of the following:  redness, tenderness, or signs of infection (pain, swelling, redness, odor or green/yellow discharge around incision site)     Notify your health care provider if you experience any of the following:  severe uncontrolled pain     Notify your health care provider if you experience any of the following:  persistent nausea and vomiting or diarrhea     Notify your health care provider if you experience any of the following:  temperature >100.4       Medications:  Reconciled Home Medications:      Medication List      CONTINUE taking these medications    * atorvastatin 40 MG tablet  Commonly known as:  LIPITOR  Take 1 tablet by mouth once daily.     * atorvastatin 40 MG tablet  Commonly known as:  LIPITOR  take 1 tablet by mouth once daily     azelastine 137 mcg (0.1 %) nasal spray  Commonly known as:  ASTELIN  1 spray (137 mcg total) by Nasal route 2 (two) times daily.     biotin 5,000 mcg Tbdl  Take by mouth.     blood sugar diagnostic Strp  Dispense madie contour strips; test blood sugar once daily     blood-glucose meter kit  Commonly known as:  CONTOUR METER  Please dispense Madie Contour meter and supplies Use as instructed Test Blood  sugar once daily     cyanocobalamin 1,000 mcg/mL injection  1,000 mcg.     diphenhydrAMINE 25 mg capsule  Commonly known as:  BENADRYL  Take 1 each (25 mg total) by mouth nightly as needed for Itching, Allergies or Insomnia.     DUREZOL 0.05 % Drop ophthalmic solution  Generic drug:  difluprednate  instill 1 to 2 drops INTO SURGERY EYE twice a day STARTING AFTER SURGERY     fluticasone 50 mcg/actuation nasal spray  Commonly known as:  FLONASE  2 sprays by Each Nare route once daily.     gabapentin 600 MG tablet  Commonly known as:  NEURONTIN  Take 600 mg by mouth 2 (two) times daily.     hydrocortisone 2.5 % cream     ICAPS AREDS ORAL  Take by mouth 2 (two) times daily.     ipratropium 42 mcg (0.06 %) nasal spray  Commonly known as:  ATROVENT     lancets Misc  Commonly known as:  ONETOUCH ULTRASOFT LANCETS  Test blood sugar once daily     levothyroxine 88 MCG tablet  Commonly known as:  SYNTHROID  Take 1 tablet (88 mcg total) by mouth before breakfast. Brand only synthroid     magnesium 30 mg Tab  Take 500 capsules by mouth. Patient's taking magnesium 500mg QD     meloxicam 15 MG tablet  Commonly known as:  MOBIC  Take 1 tablet (15 mg total) by mouth daily as needed for Pain. With food for arthritis     metFORMIN 750 MG 24 hr tablet  Commonly known as:  GLUCOPHAGE-XR  take 1 tablet by mouth twice a day with meals     metronidazole 0.75% 0.75 % Crea  Commonly known as:  METROCREAM     nitrofurantoin (macrocrystal-monohydrate) 100 MG capsule  Commonly known as:  MACROBID  Take 1 capsule (100 mg total) by mouth 2 (two) times daily.     ondansetron 8 MG tablet  Commonly known as:  ZOFRAN  Take 1 tablet (8 mg total) by mouth every 8 (eight) hours as needed for Nausea.     ospemifene 60 mg Tab  Commonly known as:  OSPHENA  Take 60 mg by mouth once daily.     pantoprazole 40 MG tablet  Commonly known as:  PROTONIX  Take 1 tablet (40 mg total) by mouth once daily.     polyethylene glycol 17 gram/dose powder  Commonly known  as:  GLYCOLAX  Take 17 g by mouth once daily.     PROLENSA 0.07 % Drop  Generic drug:  bromfenac     vitamin D 1000 units Tab  Take 185 mg by mouth once daily. D3        * This list has 2 medication(s) that are the same as other medications prescribed for you. Read the directions carefully, and ask your doctor or other care provider to review them with you.                Emiliano Rodriguez MD  General Surgery  [facility]

## 2018-05-28 ENCOUNTER — OUTPATIENT CASE MANAGEMENT (OUTPATIENT)
Dept: ADMINISTRATIVE | Facility: OTHER | Age: 73
End: 2018-05-28

## 2018-05-28 NOTE — PROGRESS NOTES
Thank you for the referral. The following patient has been assigned to Mirian Carolina RN with Outpatient Complex Care Management for high risk screening.    Reason for referral: Nausea    Please contact [facility] at [phone] with any questions.    Thank you,    Adriane Atkins, [facility]  Outpatient Complex Care Mgmt  Ext [phone]

## 2018-05-30 ENCOUNTER — OUTPATIENT CASE MANAGEMENT (OUTPATIENT)
Dept: ADMINISTRATIVE | Facility: OTHER | Age: 73
End: 2018-05-30

## 2018-05-30 NOTE — PROGRESS NOTES
5/30/18  Summary:  1st Attempt to complete initial assessment for Outpatient Care Management. Reached pt by phone. Pt reports being at lunch. Pt agrees to a call at another time.  Mirian Carolina RN, [facility]    Interventions:  None    Plan:  Follow up for 2nd Attempt to complete initial assessment for Outpatient Care Management.

## 2018-05-31 ENCOUNTER — OUTPATIENT CASE MANAGEMENT (OUTPATIENT)
Dept: ADMINISTRATIVE | Facility: OTHER | Age: 73
End: 2018-05-31

## 2018-05-31 NOTE — PROGRESS NOTES
5/31/18  Summary:  2nd Attempt to complete initial assessment for Outpatient Care Management. Pt is agreeable to assessment for OPCM services however upon initiated assessment an awaited phone call comes in to pt and must end call. Pt says to call back later.     Interventions:  None    Plan:  Call back to complete OPCM assessment

## 2018-06-01 ENCOUNTER — OUTPATIENT CASE MANAGEMENT (OUTPATIENT)
Dept: ADMINISTRATIVE | Facility: OTHER | Age: 73
End: 2018-06-01

## 2018-06-01 NOTE — PROGRESS NOTES
6/1/18  Summary:  3rd attempt to complete initial assessment for Outpatient Care Management; left message requesting a return call. A letter was mailed at the time of my 2nd attempt to complete initial assessment, requesting a return call from patient.     Interventions:  Mailed letter--unable to reach.   Dr. Mc notified, referral closed.     Plan:  Close referral.

## 2018-06-01 NOTE — LETTER
June 1, 2018    Anayeli Judd  [address]  [place] [address]             [facility]  [address]  [facility] [address] Dear Ms. Anayeli Judd:      I work with [facility] Outpatient Case Management department. We are a team of Registered Nurses and Licensed Clinical Social Workers experienced in connecting [facility] patients to resources, services and/or education that  promotes optimal health. Our services are free and our contact is over the phone for a short duration of 30-60 days and is strictly voluntary on the patients part.     We received a referral to call you to discuss your medical history.  I have attempted to reach you by telephone, but I have been unsuccessful.   If you should feel that you could benefit from our program, please do not hesitate to call.     The Outpatient case management department can be reached at [phone] from 8:00AM to 4:30 PM on Monday thru Friday. [facility] also has a program where a nurse is available 24/7 to answer questions or provide medical advice, their number is [phone].        Sincerely,        Mirian Carolina RN   Direct Phone:  [phone]

## 2018-06-15 ENCOUNTER — TELEPHONE (OUTPATIENT)
Dept: HEMATOLOGY/ONCOLOGY | Facility: CLINIC | Age: 73
End: 2018-06-15

## 2018-06-19 NOTE — PROGRESS NOTES
[facility]  General Surgery  Progress Note    Subjective:     History of Present Illness:   71 yo female who presented with SBO. Now POD14 from Ex lap, TIFFANY and G tube placement.     Post-Op Info:  Procedure(s) (LRB):  EXPLORATORY-LAPAROTOMY (N/A)  REPAIR  LYSIS-ADHESION (N/A)  PLACEMENT   15 Days Post-Op     Interval History: NAEON. Tolerating diet. No nausea or vomiting. VSS. Afebrile. Having bowel function.     Medications:  Continuous Infusions:   Scheduled Meds:   enoxaparin  1 mg/kg Subcutaneous Q12H    insulin aspart  3 Units Subcutaneous TIDWM    levothyroxine  88 mcg Oral Before breakfast    magnesium oxide  400 mg Oral Daily    pantoprazole  40 mg Oral Daily    polyethylene glycol  17 g Oral Daily    senna-docusate 8.6-50 mg  1 tablet Oral Daily    sodium chloride 0.9%  10 mL Intravenous Q6H    sodium chloride 0.9%  3 mL Intravenous Q8H    warfarin  5 mg Oral Daily     PRN Meds:cyclobenzaprine, dextrose 50%, dextrose 50%, diphenhydrAMINE, diphenhydrAMINE, glucagon (human recombinant), glucose, glucose, hydrocodone-acetaminophen 10-325mg, hydrocodone-acetaminophen 5-325mg, insulin aspart, ondansetron, promethazine (PHENERGAN) IVPB, Flushing PICC Protocol **AND** sodium chloride 0.9% **AND** sodium chloride 0.9%     Review of patient's allergies indicates:   Allergen Reactions    Codeine Nausea And Vomiting     Other reaction(s): Itching    Percocet  [oxycodone-acetaminophen]      Other reaction(s): Itching    Sulfa (sulfonamide antibiotics)      Other reaction(s): Nausea  Other reaction(s): Itching    Adhesive Rash     Objective:     Vital Signs (Most Recent):  Temp: 97 °F (36.1 °C) (04/16/17 0838)  Pulse: 77 (04/16/17 0838)  Resp: 15 (04/16/17 0838)  BP: (!) 126/56 (04/16/17 0838)  SpO2: 97 % (04/16/17 0838) Vital Signs (24h Range):  Temp:  [97 °F (36.1 °C)-98.3 °F (36.8 °C)] 97 °F (36.1 °C)  Pulse:  [72-78] 77  Resp:  [15-20] 15  SpO2:  [96 %-100 %] 97 %  BP:  (114-135)/(56-64) 126/56     Weight: 69.9 kg (154 lb)  Body mass index is 26.43 kg/(m^2).    Intake/Output - Last 3 Shifts       04/14 0700 - 04/15 0659 04/15 0700 - 04/16 0659 04/16 0700 - 04/17 0659    P.O. 480 1317     I.V. (mL/kg) 20 (0.3)      NG/GT 40      IV Piggyback             Total Intake(mL/kg) 945 (13.5) 1317 (18.9)     Urine (mL/kg/hr) 750 (0.4) 1500 (0.9)     Emesis/NG output  0 (0)     Drains       Other  0 (0)     Stool 0 (0) 0 (0)     Total Output 750 1500      Net +195 -183             Urine Occurrence 4 x 3 x     Stool Occurrence 4 x 1 x     Emesis Occurrence  0 x           Physical Exam    Significant Labs:  CBC:   Recent Labs  Lab 04/16/17  0400   WBC 5.69   RBC 2.52*   HGB 7.7*   HCT 23.3*      MCV 93   MCH 30.6   MCHC 33.0     CMP:   Recent Labs  Lab 04/16/17  0400   *   CALCIUM 8.4*   ALBUMIN 2.1*   PROT 6.3   *   K 4.0   CO2 24      BUN 14   CREATININE 0.8   ALKPHOS 71   ALT 16   AST 21   BILITOT 0.4     Significant Diagnostics:  None     Assessment/Plan:     * SBO (small bowel obstruction)  S/p ex lap, TIFFANY, G tube placement   G tube clamped  On diabetic diet/low residue diet    Having bowel function. Continue bowel regimen - miralax and senna/colace daily  PRN PO pain meds     Acid reflux  PO protonix     Hypothyroid  Continue home meds  Endo following, appreciate assistance    Type 2 diabetes mellitus without complication, without long-term current use of insulin  SSI. Endo following.  Appreciate assistance  Continue with diabetic diet     Acute deep vein thrombosis (DVT) of upper extremity  On therapeutic lovenox  Continue with 5mg coumadin  Trend INR. INR 1.0 again this AM     Wound infection after surgery  S/p opening incision. Erythema resolved  Continuing daily packing changes   No abx   Afebrile, no leukocytosis     Hypomagnesemia  Replace today       Dispo: ready for placement to SNIF vs. Rehab. Spoke with  on call yesterday about her  Quality 110: Preventive Care And Screening: Influenza Immunization: Influenza Immunization previously received during influenza season ready for discharge. Only needs - coumadin clinic and f/u of INR.     Larry Mc MD  General Surgery  [facility]   Detail Level: Detailed Quality 111:Pneumonia Vaccination Status For Older Adults: Pneumococcal Vaccination Previously Received Quality 130: Documentation Of Current Medications In The Medical Record: Current Medications Documented Quality 265: Biopsy Follow-Up: Biopsy results reviewed, communicated, tracked, and documented

## 2018-06-20 ENCOUNTER — OFFICE VISIT (OUTPATIENT)
Dept: UROLOGY | Facility: CLINIC | Age: 73
End: 2018-06-20
Attending: UROLOGY
Payer: MEDICARE

## 2018-06-20 VITALS
SYSTOLIC BLOOD PRESSURE: 124 MMHG | WEIGHT: 153.75 LBS | BODY MASS INDEX: 28.29 KG/M2 | DIASTOLIC BLOOD PRESSURE: 68 MMHG | HEIGHT: 62 IN

## 2018-06-20 DIAGNOSIS — N39.0 RECURRENT UTI: Primary | ICD-10-CM

## 2018-06-20 LAB
BILIRUB SERPL-MCNC: ABNORMAL MG/DL
BLOOD URINE, POC: ABNORMAL
COLOR, POC UA: YELLOW
GLUCOSE UR QL STRIP: 250
KETONES UR QL STRIP: ABNORMAL
LEUKOCYTE ESTERASE URINE, POC: ABNORMAL
NITRITE, POC UA: ABNORMAL
PH, POC UA: 5
POC RESIDUAL URINE VOLUME: 20 ML (ref 0–100)
PROTEIN, POC: ABNORMAL
SPECIFIC GRAVITY, POC UA: 1
UROBILINOGEN, POC UA: ABNORMAL

## 2018-06-20 PROCEDURE — 87086 URINE CULTURE/COLONY COUNT: CPT

## 2018-06-20 PROCEDURE — 51798 US URINE CAPACITY MEASURE: CPT | Mod: S$GLB,,, | Performed by: UROLOGY

## 2018-06-20 PROCEDURE — 81002 URINALYSIS NONAUTO W/O SCOPE: CPT | Mod: S$GLB,,, | Performed by: UROLOGY

## 2018-06-20 PROCEDURE — 99214 OFFICE O/P EST MOD 30 MIN: CPT | Mod: 25,S$GLB,, | Performed by: UROLOGY

## 2018-06-20 PROCEDURE — 87088 URINE BACTERIA CULTURE: CPT

## 2018-06-20 PROCEDURE — 87106 FUNGI IDENTIFICATION YEAST: CPT

## 2018-06-20 RX ORDER — CEPHALEXIN 250 MG/1
250 CAPSULE ORAL DAILY
Qty: 30 CAPSULE | Refills: 5 | Status: SHIPPED | OUTPATIENT
Start: 2018-06-20 | End: 2018-10-31 | Stop reason: ALTCHOICE

## 2018-06-20 NOTE — PROGRESS NOTES
"Subjective:      Anayeli Judd is a 73 y.o. female who returns today regarding her recurrent UTIs.    Last seen in August 2017. In interim, has had at least 3 culture proven UTIs. Symptoms include bladder pain and frequent urination.     She previously did well with 6 months of prophylaxis with cipro (no UTIs during that time).    Of note, she has history of extensive bowel surgery. Subsequent to this she now has bowel incontinence, usually liquid, requiring pad use. Pads are regularly soiled with stool. She has seen CR surgery, who informed her there were no treatments to reduce this.    The following portions of the patient's history were reviewed and updated as appropriate: allergies, current medications, past family history, past medical history, past social history, past surgical history and problem list.    Review of Systems  A comprehensive multipoint review of systems was negative except as otherwise stated in the HPI.     Objective:   Vitals:   /68 (BP Location: Left arm, Patient Position: Sitting, BP Method: Large (Manual))   Ht 5' 2" (1.575 m)   Wt 69.7 kg (153 lb 12.3 oz)   LMP  (LMP Unknown)   BMI 28.13 kg/m²     Physical Exam   General: alert and oriented, no acute distress  Respiratory: Symmetric expansion, non-labored breathing  Neuro: no gross deficits  Psych: normal judgment and insight, normal mood/affect and non-anxious    Lab Review   Urinalysis demonstrates positive for WBCs and RBCs  Lab Results   Component Value Date    WBC 4.36 05/25/2018    HGB 9.8 (L) 05/25/2018    HCT 31.6 (L) 05/25/2018    HCT 33 (L) 05/24/2018    MCV 91 05/25/2018    PLT 90 (L) 05/25/2018     Lab Results   Component Value Date    CREATININE 0.7 05/25/2018    BUN 11 05/25/2018     Imaging   None today    Assessment and Plan:   1. Recurrent UTI  2. Acute cystitis  3. Fecal incontinence of feces  - Restart prophylaxis with Keflex 250mg daily  - UA/UCx today and prn  - FU 6 months  "

## 2018-06-22 LAB
BACTERIA UR CULT: NORMAL
BACTERIA UR CULT: NORMAL

## 2018-06-26 ENCOUNTER — TELEPHONE (OUTPATIENT)
Dept: UROGYNECOLOGY | Facility: CLINIC | Age: 73
End: 2018-06-26

## 2018-06-26 ENCOUNTER — APPOINTMENT (OUTPATIENT)
Dept: LAB | Facility: HOSPITAL | Age: 73
End: 2018-06-26
Attending: PATHOLOGY
Payer: MEDICARE

## 2018-06-26 PROBLEM — E53.8 VITAMIN B12 DEFICIENCY: Status: ACTIVE | Noted: 2018-06-26

## 2018-06-26 NOTE — TELEPHONE ENCOUNTER
Spoke to Nuria with Dr. Mc's office, consult appointment scheduled 6/29/2018 at [facility] location. Nuria states she will inform pt.

## 2018-06-28 ENCOUNTER — HOSPITAL ENCOUNTER (EMERGENCY)
Facility: OTHER | Age: 73
Discharge: LEFT BEFORE DISCHARGE | End: 2018-06-28
Payer: MEDICARE

## 2018-06-28 ENCOUNTER — CLINICAL SUPPORT (OUTPATIENT)
Dept: FAMILY MEDICINE | Facility: CLINIC | Age: 73
End: 2018-06-28
Attending: FAMILY MEDICINE
Payer: MEDICARE

## 2018-06-28 VITALS
RESPIRATION RATE: 18 BRPM | WEIGHT: 155 LBS | HEIGHT: 62 IN | HEART RATE: 68 BPM | DIASTOLIC BLOOD PRESSURE: 59 MMHG | OXYGEN SATURATION: 100 % | SYSTOLIC BLOOD PRESSURE: 134 MMHG | TEMPERATURE: 98 F | BODY MASS INDEX: 28.52 KG/M2

## 2018-06-28 DIAGNOSIS — R73.9 HYPERGLYCEMIA: Primary | ICD-10-CM

## 2018-06-28 LAB — POCT GLUCOSE: 371 MG/DL (ref 70–110)

## 2018-06-28 PROCEDURE — 82962 GLUCOSE BLOOD TEST: CPT

## 2018-06-28 PROCEDURE — 99900041 HC LEFT WITHOUT BEING SEEN- EMERGENCY

## 2018-06-28 NOTE — PROVIDER PROGRESS NOTES - EMERGENCY DEPT.
Encounter Date: 6/28/2018    ED Physician Progress Notes        Physician Note:   The patient was seen in our private triage room by the myself for Medical Screening Exam. The patient is stable. Awaiting room in the ED.  The patient did ask if she felt she should go to [facility] to be seen and evaluated.  I did let her know that if she was patient we could see her here but if she wanted to leave and go to [facility] after evaluating her I feel she is safe to this.

## 2018-06-29 ENCOUNTER — INITIAL CONSULT (OUTPATIENT)
Dept: UROGYNECOLOGY | Facility: CLINIC | Age: 73
End: 2018-06-29
Payer: MEDICARE

## 2018-06-29 ENCOUNTER — PES CALL (OUTPATIENT)
Dept: ADMINISTRATIVE | Facility: CLINIC | Age: 73
End: 2018-06-29

## 2018-06-29 VITALS
BODY MASS INDEX: 28.64 KG/M2 | HEIGHT: 62 IN | WEIGHT: 155.63 LBS | DIASTOLIC BLOOD PRESSURE: 62 MMHG | SYSTOLIC BLOOD PRESSURE: 122 MMHG

## 2018-06-29 DIAGNOSIS — R19.8 IRREGULAR BOWEL HABITS: ICD-10-CM

## 2018-06-29 DIAGNOSIS — R15.1 FECAL SOILING: ICD-10-CM

## 2018-06-29 DIAGNOSIS — R33.9 INCOMPLETE EMPTYING OF BLADDER: ICD-10-CM

## 2018-06-29 DIAGNOSIS — N95.2 VAGINAL ATROPHY: Primary | ICD-10-CM

## 2018-06-29 DIAGNOSIS — R30.0 DYSURIA: ICD-10-CM

## 2018-06-29 DIAGNOSIS — N39.0 FREQUENT UTI: ICD-10-CM

## 2018-06-29 DIAGNOSIS — N89.8 VAGINAL IRRITATION: ICD-10-CM

## 2018-06-29 DIAGNOSIS — N39.46 MIXED STRESS AND URGE URINARY INCONTINENCE: ICD-10-CM

## 2018-06-29 DIAGNOSIS — R79.89 ELEVATED SERUM CREATININE: ICD-10-CM

## 2018-06-29 PROCEDURE — 57160 INSERT PESSARY/OTHER DEVICE: CPT | Mod: PBBFAC | Performed by: OBSTETRICS & GYNECOLOGY

## 2018-06-29 PROCEDURE — 87086 URINE CULTURE/COLONY COUNT: CPT

## 2018-06-29 PROCEDURE — 57160 INSERT PESSARY/OTHER DEVICE: CPT | Mod: S$PBB,,, | Performed by: OBSTETRICS & GYNECOLOGY

## 2018-06-29 PROCEDURE — 99213 OFFICE O/P EST LOW 20 MIN: CPT | Mod: PBBFAC,25 | Performed by: OBSTETRICS & GYNECOLOGY

## 2018-06-29 PROCEDURE — 51701 INSERT BLADDER CATHETER: CPT | Mod: PBBFAC | Performed by: OBSTETRICS & GYNECOLOGY

## 2018-06-29 PROCEDURE — 99999 PR PBB SHADOW E&M-EST. PATIENT-LVL III: CPT | Mod: PBBFAC,,, | Performed by: OBSTETRICS & GYNECOLOGY

## 2018-06-29 PROCEDURE — 99205 OFFICE O/P NEW HI 60 MIN: CPT | Mod: 25,S$PBB,, | Performed by: OBSTETRICS & GYNECOLOGY

## 2018-06-29 NOTE — PROGRESS NOTES
"[facility] [address] [place] [address]     Hardik Judd  [id number]  1945    Consulting Physician: Rocio Mc, *   GYN: Dr. Juan C Doty M.D.: Rocio Mc MD    Chief Complaint   Patient presents with    Consult     Incontinence    Anal Fistula       HPI:     1)  UI:  (+) MAYKEL (sneeze, cough)  = (+) UUI (1st AM)  X years but worse after CRS.  (+) pads (liners or thick, depending on how in control she feels):10/day, usually moderate wetness and keeps pad by bed (does not wear one to "air out"), uses pad on way to BR--usually has to change clothes at night.   Daytime frequency: Q 2 hours.  Nocturia: Yes: 4/night.   (--) dysuria,  (--) hematuria,  (+) frequent UTIs since CRS surgery.  Currently on daily Keflex 250 mg daily--followed by [facility].   (+) complete bladder emptying.    2)  POP:  Absent. (--) vaginal bleeding. (--) vaginal discharge. (--) sexually active.  (--) dyspareunia-- has been .  (+)  Vaginal dryness--taking osphena, which was helping but thinks that she is dry due to excessive cleaning.  (--) vaginal estrogen use--tried in past but didn't help.     3)  BM: h/o colon CA s/p sigmoidectomy.  Had some postop anal leakage. Doesn't always feel when this happens.  This has increased UTI frequency.  Stays on BRAT diet to help stool consistency.  Takes fibercon 2 in AM and miralax every AM.   (--) constipation/straining.  (+) chronic diarrhea. (--) hematochezia.  (+) fecal incontinence, loose.  (--) fecal smearing/urgency.  (+) complete evacuation but unsure because has frequent BMs.   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 Ward with extensive TIFFANY (>2h) and multiple enterotomies.  S/p chemo.  No radTx. Has had recurrent SBOs.  Last was 18.  Usually gets better with " bowel rest and contrast study.    --Patient states that since last year she has been dealing with intermittent abdominal pain due to her scar tissue for which she has been seeing Dr. Erazo     Past Medical History  Past Medical History:   Diagnosis Date    Allergic rhinitis     Arthritis     Blood transfusion     during delivery and     Bowel obstruction     Cervical radiculopathy     followed by dr cloud    Colon cancer     transverse colon; resected; Stage IIA (pT3 pN0 MX)    Diarrhea     Family history of breast cancer     Family history of colon cancer     Fatty liver     GERD (gastroesophageal reflux disease)     History of shingles     Hyperlipidemia     Hypothyroidism     Irritable bowel syndrome     Microscopic colitis     treated     Raynaud phenomenon     Raynaud's disease     Type 2 diabetes mellitus      DM2:   Hemoglobin A1C   Date Value Ref Range Status   2018 8.7 (H) 4.0 - 5.6 % Final     Comment:     According to ADA guidelines, hemoglobin A1c <7.0% represents  optimal control in non-pregnant diabetic patients. Different  metrics may apply to specific patient populations.   Standards of Medical Care in Diabetes-2016.  For the purpose of screening for the presence of diabetes:  <5.7%     Consistent with the absence of diabetes  5.7-6.4%  Consistent with increasing risk for diabetes   (prediabetes)  >or=6.5%  Consistent with diabetes  Currently, no consensus exists for use of hemoglobin A1c  for diagnosis of diabetes for children.  This Hemoglobin A1c assay has significant interference with fetal   hemoglobin   (HbF). The results are invalid for patients with abnormal amounts of   HbF,   including those with known Hereditary Persistence   of Fetal Hemoglobin. Heterozygous hemoglobin variants (HbAS, HbAC,   HbAD, HbAE, HbA2) do not significantly interfere with this assay;   however, presence of multiple variants in a sample may impact the %   interference.      02/20/2018 6.8 (H) 4.0 - 5.6 % Final     Comment:     According to ADA guidelines, hemoglobin A1c <7.0% represents  optimal control in non-pregnant diabetic patients. Different  metrics may apply to specific patient populations.   Standards of Medical Care in Diabetes-2016.  For the purpose of screening for the presence of diabetes:  <5.7%     Consistent with the absence of diabetes  5.7-6.4%  Consistent with increasing risk for diabetes   (prediabetes)  >or=6.5%  Consistent with diabetes  Currently, no consensus exists for use of hemoglobin A1c  for diagnosis of diabetes for children.  This Hemoglobin A1c assay has significant interference with fetal   hemoglobin   (HbF). The results are invalid for patients with abnormal amounts of   HbF,   including those with known Hereditary Persistence   of Fetal Hemoglobin. Heterozygous hemoglobin variants (HbAS, HbAC,   HbAD, HbAE, HbA2) do not significantly interfere with this assay;   however, presence of multiple variants in a sample may impact the %   interference.     10/23/2017 6.5 (H) 4.0 - 5.6 % Final     Comment:     According to ADA guidelines, hemoglobin A1c <7.0% represents  optimal control in non-pregnant diabetic patients. Different  metrics may apply to specific patient populations.   Standards of Medical Care in Diabetes-2016.  For the purpose of screening for the presence of diabetes:  <5.7%     Consistent with the absence of diabetes  5.7-6.4%  Consistent with increasing risk for diabetes   (prediabetes)  >or=6.5%  Consistent with diabetes  Currently, no consensus exists for use of hemoglobin A1c  for diagnosis of diabetes for children.  This Hemoglobin A1c assay has significant interference with fetal   hemoglobin   (HbF). The results are invalid for patients with abnormal amounts of   HbF,   including those with known Hereditary Persistence   of Fetal Hemoglobin. Heterozygous hemoglobin variants (HbAS, HbAC,   HbAD, HbAE, HbA2) do not significantly  interfere with this assay;   however, presence of multiple variants in a sample may impact the %   interference.         Past Surgical History  Past Surgical History:   Procedure Laterality Date    APPENDECTOMY      BACK SURGERY      CARPAL TUNNEL RELEASE      bilateral      SECTION      CHOLECYSTECTOMY  1965    COLECTOMY  2011    Transverse colon resection by Dr. Aguirre    COLONOSCOPY N/A 2017    Procedure: COLONOSCOPY;  Surgeon: Manjit Alvarez MD;  Location: [address]);  Service: Endoscopy;  Laterality: N/A;    EYE SURGERY      Cataract Removal    HYSTERECTOMY      posterolateral fusion with autograft bone and [place] mineralized bone matrix  13    [facility] for lumbar spine stenosis    TOE SURGERY      TONSILLECTOMY      TRIGGER FINGER RELEASE     RUQ appy + venus  vert xlap for partial colectomy    Hysterectomy: Yes   Date: 29 yo.  Indication: abnl paps.    Type: xlap  Cervix present: No  Ovaries present: No  Other procedures at time of hysterectomy: none    Past Ob History     x 2.  C/s x 1.    Largest infant weight: 7#  yes FAVD. yes episiotomy.      Gynecologic History  LMP: No LMP recorded (lmp unknown). Patient has had a hysterectomy.  Age of menarche: 8 yo  Age of menopause: with ANGEL  Menstrual history: h/o menorrhagia  Pap test: post ANGEL.  History of abnormal paps: Yes--had ANGEL.  History of STIs:  No  Mammogram: Date of last: .  Result: Normal per report ([facility])  Colonoscopy: Date of last: .  Result:  Benign polyps.  Repeat due:  .    DEXA:  Date of last: .  Result:  Normal.      Family History  Family History   Problem Relation Age of Onset    Heart disease Father 50        Mi age 50    Colon cancer Father     Bladder Cancer Mother         non smoker    Cataracts Mother     Glaucoma Mother     Heart disease Mother     Hyperlipidemia Mother     Kidney disease Mother     Breast cancer Sister 79    Arthritis Daughter     Asthma  Daughter     Depression Daughter     Hypertension Daughter     Stroke Daughter 40    Arthritis Brother     Colon cancer Brother 70    Alcohol abuse Brother     Parkinsonism Brother     Alcohol abuse Brother     Depression Daughter     Celiac disease Neg Hx     Cirrhosis Neg Hx     Colon polyps Neg Hx     Crohn's disease Neg Hx     Cystic fibrosis Neg Hx     Esophageal cancer Neg Hx     Hemochromatosis Neg Hx     Inflammatory bowel disease Neg Hx     Irritable bowel syndrome Neg Hx     Liver cancer Neg Hx     Liver disease Neg Hx     Rectal cancer Neg Hx     Stomach cancer Neg Hx     Ulcerative colitis Neg Hx     Eliazar's disease Neg Hx     Amblyopia Neg Hx     Blindness Neg Hx     Macular degeneration Neg Hx     Retinal detachment Neg Hx     Strabismus Neg Hx     Melanoma Neg Hx       Colon CA: Yes - father, brother, paternal side  Breast CA: Yes - sister  GYN CA: No   CA: Yes - mother (bladder, no tob use)    Social History  History   Smoking Status    Never Smoker   Smokeless Tobacco    Never Used     History   Alcohol Use    3.0 oz/week    5 Glasses of wine per week     Comment: socially   .    History   Drug Use No     The patient is .  Resides in [place] [address].  Employment status: homemaker.     Allergies  Review of patient's allergies indicates:   Allergen Reactions    Percocet  [oxycodone-acetaminophen]      Other reaction(s): Itching    Sulfa (sulfonamide antibiotics)      Other reaction(s): Nausea  Other reaction(s): Itching    Adhesive Rash       Medications  Current Outpatient Prescriptions on File Prior to Visit   Medication Sig Dispense Refill    atorvastatin (LIPITOR) 40 MG tablet take 1 tablet by mouth once daily 90 tablet 1    biotin 5,000 mcg TbDL Take by mouth.      blood sugar diagnostic Strp Dispense madie contour strips; test blood sugar once daily 100 strip 11    blood-glucose meter (CONTOUR METER) kit Please dispense Madie Contour meter  and supplies Use as instructed Test Blood sugar once daily 1 each 0    cephALEXin (KEFLEX) 250 MG capsule Take 1 capsule (250 mg total) by mouth once daily. 30 capsule 5    diphenhydrAMINE (BENADRYL) 25 mg capsule Take 1 each (25 mg total) by mouth nightly as needed for Itching, Allergies or Insomnia.  0    fluticasone (FLONASE) 50 mcg/actuation nasal spray 2 sprays by Each Nare route once daily.  0    gabapentin (NEURONTIN) 600 MG tablet Take 600 mg by mouth 2 (two) times daily.      hydrocortisone 2.5 % cream   0    ipratropium (ATROVENT) 0.06 % nasal spray   0    lancets (ONE TOUCH ULTRASOFT LANCETS) Misc Test blood sugar once daily 200 each 11    levothyroxine (SYNTHROID) 100 MCG tablet Take 100 mcg by mouth once daily.      magnesium 30 mg Tab Take 500 capsules by mouth. Patient's taking magnesium 500mg QD      meloxicam (MOBIC) 15 MG tablet Take 1 tablet (15 mg total) by mouth daily as needed for Pain. With food for arthritis 90 tablet 1    metFORMIN (GLUCOPHAGE-XR) 750 MG 24 hr tablet take 1 tablet by mouth twice a day with meals 180 tablet 1    metronidazole 0.75% (METROCREAM) 0.75 % Crea   0    ondansetron (ZOFRAN) 8 MG tablet Take 1 tablet (8 mg total) by mouth every 8 (eight) hours as needed for Nausea. 20 tablet 0    ospemifene (OSPHENA) 60 mg Tab Take 60 mg by mouth once daily. 90 tablet 1    pantoprazole (PROTONIX) 40 MG tablet Take 1 tablet (40 mg total) by mouth once daily. 30 tablet 3    polyethylene glycol (GLYCOLAX) 17 gram/dose powder Take 17 g by mouth once daily.  0    promethazine-codeine 6.25-10 mg/5 ml (PHENERGAN WITH CODEINE) 6.25-10 mg/5 mL syrup Take 5 mLs by mouth every 4 (four) hours as needed for Cough. 240 mL 0    VIT A/VIT C/VIT E/ZINC/COPPER (ICAPS AREDS ORAL) Take by mouth 2 (two) times daily.      vitamin D 1000 units Tab Take 185 mg by mouth once daily. D3      azelastine (ASTELIN) 137 mcg nasal spray 1 spray (137 mcg total) by Nasal route 2 (two) times daily.  "30 mL 1     No current facility-administered medications on file prior to visit.        Review of Systems A 14 point ROS was reviewed with pertinent positives as noted above in the history of present illness.      Constitutional: negative  Eyes: negative  Endocrine: negative  Gastrointestinal: negative  Cardiovascular: negative  Respiratory: negative  Allergic/Immunologic: negative  Integumentary: negative  Psychiatric: negative  Musculoskeletal: negative   Ear/Nose/Throat: negative  Neurologic: negative  Genitourinary: SEE HPI  Hematologic/Lymphatic: negative   Breast: negative    Urogynecologic Exam  /62 (BP Location: Left arm, Patient Position: Sitting, BP Method: Large (Manual))   Ht 5' 2" (1.575 m)   Wt 70.6 kg (155 lb 10.3 oz)   LMP  (LMP Unknown)   BMI 28.47 kg/m²     GENERAL APPEARANCE:  The patient is well-developed, well-nourished.  Neck:  Supple with no thyromegaly, no carotid bruits.  Heart:  Regular rate and rhythm, no murmurs, rubs or gallops.  Lungs:  Clear.  No CVA tenderness.  Abdomen:  Soft, nontender, nondistended, no hepatosplenomegaly.  Incisions:  RUQ, vert midline well-healed    PELVIC:    External genitalia:  Normal Bartholins, Skenes and labia bilaterally.  Partial regression of labia minora. Mild TTP at introitus due to atrophy.   Urethra:  No caruncle, diverticulum or masses.  (+) hypermobility.    Vagina:  Atrophy (+) , no bladder masses or tender, no discharge.    Cervix:  absent  Uterus: uterus absent  Adnexa: Not palpable.    POP-Q:  Aa 0; Ba 0; C -8; Ap -1; Bp -1.  Genital hiatus 3, perineal body 2, total vaginal length 9.    NEUROLOGIC:  Cranial nerves 2 through 12 intact.  Strength 5/5.  DTRs 2+ lower extremities.  S2 through 4 normal.  Sacral reflexes intact.    EXT: JAVED, 2+ pulses bilaterally, no C/C/E    COUGH STRESS TEST:  negative  KEGEL: 1 /5    RECTAL:    External:  Normal, (--) hemorrhoids, (--) dovetailing.   Internal:   (--) tenderness, (--) masses, Abnormal - " decreased resting tone, Abnormal - decreased active tone.    PVR: 120 mL (without pessary)    The patient was fit with #1 UI + POP pessary (unable to empty bladder); #2 UI + POP too large; 2 ring with support pessary ok & able to empty.  She was able to tolerate the device comfortabley with bending, squatting, valsalva.  She was not able to demonstrate independent removal and placement.  She tolerated the procedure well.      Voiding trial  The patient was placed in dorsal lithotomy position with feet in stirrups.  The urethra was prepped x 2 with betadine.  A red rubber catheter was used to drain the bladder.  The bladder was filled with 300 mL sterile water to gravity with a 60 mL kam tipped syringe, at which point she voiced a strong desire to void.  The catheter was removed. She was asked to cough with (--) leak. She was allowed to void, with 310 mL produced.  She tolerated the procedure well.    Impression: (--) MAYKEL with pessary; +complete bladder emptying with pessary    Impression    1. Vaginal atrophy    2. Dysuria    3. Incomplete emptying of bladder    4. Mixed stress and urge urinary incontinence    5. Vaginal irritation    6. Frequent UTI    7. Fecal soiling    8. Irregular bowel habits    9. Elevated serum creatinine        Initial Plan  The patient was counseled regarding these issues. The patient was given a summary sheet containing each of these issues with possible options for evaluation and management. When appropriate, we also reviewed computer-generated diagrams specific to their diagnoses..  All questions were addressed to the patient's satisfaction.    1)  Mixed urinary incontinence, urge > stress:    --urine C&S  --trial of pessary  --control diabetes  --Empty bladder every 3 hours.  Empty well: wait a minute, lean forward on toilet.    --Avoid dietary irritants (see sheet).  Keep diary x 3-5 days to determine your irritants.  --consider PT in future   --URGE: consider medication (Mirabegron  due to SBO).  Takes 2-4 weeks to see if will have effect.  For dry mouth: get sour, sugar free lozenge or gum.    --STRESS:  Pessary vs. Sling.     2)  Vaginal atrophy (dryness):  Use dime-sized amount of estrogen cream with finger around vaginal opening/inner lips nightly.     --continue osphena  --may also reduce bladder infection rate    3)  Vaginal irritation:  --probably due to wetness  --work on continence control  --change pads whenever wet/dirty  --can use cream from PCP daily or other barrier cream (Boudreax's or Desitin)--wipe off at night so can apply estrogen cream  --no vaseline     4)  Incomplete bladder emptying:  --see if pessary helps  --may be contributing to UTIs  --recheck PVR next visit    5)  Frequent UTIs (bladder infections):  --urine C&S sent today  --control diabetes  --work on improving bladder emptying  --continue Keflex 250 mg daily  --If you feel like you have a UTI, please call our office so that we can place an order for you to drop off a urine specimen at the closest [facility] lab (we will arrange).     --We will call in antibiotics for you to start right after you drop off specimen.     --In this way, we can determine:     1)  Do you have a UTI?      2) If you have a UTI, is it sensitive to the antibiotics we prescribed?   --follow UTI prevention tips (see attached)  --control bowel movements/fecal cross-contamination  --treat vaginal atrophy (dryness)  --empty bladder before and after intercourse  --consider need for further evaluation (pelvic imaging and cystoscopy) if issue persists; 618 CT A/P  normal    6)  Constipation intermittent with loose stool:  Controlling constipation may help bladder urgency/leakage and fiber may better control cholesterol and blood glucose.  Increas daily fiber.  Increase fiber by 1 tablet every 3-5 days until stool is easy to pass. Take fiber tablets with large glass of water.  When find place where stool is easy to pass and more well-formed, less  loose/accidents,  stop and continue at that dose.   Do not exceed 6 tablets/day.    --May also use over the counter stool softener 1-2 x/day.  --continue use miralax daily--see if taking 1/2 dose is sufficient    7)  Elevated creatinine:  --seems acute and related to DM control/dehydrated  --hydrate well  --control diabetes  --follow up with Dr. Cox    8)  RTC 1 month.     Approximately >60 min were spent in consult, 75 % in discussion.     Thank you for requesting consultation of your patient.  I look forward to participating in their care.    Juanita Kuo  Female Pelvic Medicine and Reconstructive Surgery  [facility] [place], [place]

## 2018-06-29 NOTE — PATIENT INSTRUCTIONS
Bladder Irritants  Certain foods and drinks have been associated with worsening symptoms of urinary frequency, urgency, urge incontinence, or bladder pain. If you suffer from any of these conditions, you may wish to try eliminating one or more of these foods from your diet and see if your symptoms improve. If bladder symptoms are related to dietary factors, strict adherence to a diet thateliminates the food should bring marked relief in 10 days. Once you are feeling better, you can begin to add foods back into your diet, one at a time. If symptoms return, you will be able to identify the irritant. As you add foods back to your diet it is very important that you drink significant amounts of water.    -----------------------------------------------------------------------------------------------  List of Common Bladder Irritants*  Alcoholic beverages  Apples and apple juice  Cantaloupe  Carbonated beverages  Chili and spicy foods  Chocolate  Citrus fruit  Coffee (including decaffeinated)  Cranberries and cranberry juice  Grapes  Guava  Milk Products: milk, cheese, cottage cheese, yogurt, ice cream  Peaches  Pineapple  Plums  Strawberries  Sugar especially artificial sweeteners, saccharin, aspartame, corn sweeteners, honey, fructose, sucrose, lactose  Tea  Tomatoes and tomato juice  Vitamin B complex  Vinegar  *Most people are not sensitive to ALL of these products; your goal is to find the foods that make YOUR symptoms worse.  ---------------------------------------------------------------------------------------------------    Low-acid fruit substitutions include apricots, papaya, pears and watermelon. Coffee drinkers can drink Kava or other lowacid instant drinks. Tea drinkers can substitute non-citrus herbal and sun brewed teas. Calcium carbonate co-buffered with calcium ascorbate can be substituted for Vitamin C. Prelief is a dietary supplement that works as an acid blocker for the bladder.    Where to get more  information:        Overcoming Bladder Disorders by Radha Barber and Sharona Bowles, 1990        You Dont Have to Live with Cystitis! By Yesenia Celis, 1988  · http://www.urologymanagement.org/oab    ----------------------------------------------------------------------------------    Fiber Information Sheet  Your doctor has recommended that you follow a high fiber diet. The addition of fiber to your diet can make an enormous difference in your bowel control and regularity. Fiber helps people whether they lose stool or have trouble with constipation. Fiber works by bulking the stool and keeping it formed, yet making the movement soft and easy to pass. Fiber helps keep moisture within the stool so that neither diarrhea nor hard stool occurs. Fiber makes the bowels work more regularly, but it is not a laxative. An additional bonus from eating a high fiber diet is that your risk of cancer is reduced, too.    Most of us eat some high fiber foods already, but nearly all of us do not eat the necessary amount. For example, a slice of whole wheat bread contains only about 10% of the daily recommended amount of fiber. This means if you are relying on only whole wheat bread to meet the recommended fiber requirements, you would need to eat  between 10-18 slices every day! Please note that fiber is NOT in any meat or dairy product. It is only found in grains, vegetables and fruits. The recommended daily fiber intake is 20-25 grams. Foods having high fiber content include:     Fiber One Cereal, ½ cup 13.0 g   Berrios beans, ¾ cup 10.4 g   Wheat bran cereal, 1 oz 10.0 g   Kidney beans, ¾ cup 9.3 g   All Bran Cereal, ½ cup 6.0 g   Oat Bran Cereal, hot, 1 oz 4.0 g   Banana, 1medium 3.8 g   Canned pears, ½ cup 3.7 g   3 prunes or ¼ cup raisins 3.5 g   Whole Wheat Total, 1 cup 3.0 g   Carrots, ½ cup 3.2 g   Apple, small 2.8 g   Broccoli, ½ cup 2.8 g   Cauliflower, ½ cup 2.6 g   Oatmeal, 1 oz 2.5  g   Whole Wheat Toast 2.0 g   Cheerios, 1 1/3 cup 2.0 g   Baked potato with skin 2.0 g   Corn, ½ cup 1.9 g   Popcorn, 3 cups 1.9 g   Orange, medium 1.9 g   Granola bar 1.0 g   Lettuce, ½ cup 0.9 g    If you dont think that you can get enough fiber through your everyday diet, there are many good fiber supplements you can take along with eating your high fiber diet. Some of these are: Metamucil (1 heaping teaspoon or 1-2 wafers), Citrucel (1 tablespoon), Fiberall (1-2 wafers or 1 teaspoon), Perdium (2 rounded teaspoons) and 1-2 teaspoons unprocessed bran (to mix with foods)    You may need to use the fiber supplement up to 3-4 times daily to produce normal elimination. Please follow specific package directions or call us for help in regulating the dose. You may notice some bloating and/or increased gas at first. These symptoms can be relieved by adding fiber to your diet slowly. Once your body gets used to this increased fiber, these symptoms will go away.   ---------------------------------------------------------------------------------------  UTI PREVENTION: (from [employer] for Continence)  Urinary Tract Infection (UTI)  More than 4 million doctor office visits per year in the [place] are for urinary tract infections (UTI). About 12% of men and 50% of women will have a UTI during his or her lifetime. Most UTIs arise from bacteria that normally live in the colon and rectum and are present in bowel movements. These bacteria cling to the opening of the urethra, begin to multiply, & travel up to the bladder. Urine flow from the bladder usually washes bacteria out of the body. However, because women have a shorter urethra than men, bacteria can reach the bladder more easily and settle into the bladder wall. This is why women are more likely to develop UTIs than men. This risk factor is exacerbated by the greater likelihood that women introduce bacteria from fecal matter, following a bowel movement,  into the urinary tract. Less often, bacteria can spread to the kidney from the bloodstream. A recurrent UTI is classified as three or more a year.  Risk Factors for UTI  Several factors can contribute to the risk of UTI. Sexual intercourse, use of contraceptive spermicide, low levels of estrogen, catheterization, diabetes, pregnancy, and immune suppression increase susceptibility to UTI.  Naturally occurring estrogen helps prevent recurrent UTI in women. After menopause, estrogen levels drop along with the number of vaginal lactobacilli, the good bacteria which prevent growth of intestinal bacteria in the vagina. This makes postmenopausal women especially susceptible to UTI.  Catheters also present a risk of recurring UTI. Catheters are associated with colonization of bacteria and increased risks of clinical infection. Using the techniques described previously can help keep catheters clean and prevent recurrent UTI. While single-use of sterile catheters reduces the risks, it does not prevent UTI from occurring. It is therefore important to maintain proper care and use of catheters at all times while remaining alert to symptoms of UTI.  Common Symptoms of UTI   Painful urination   Frequency   Urgency   Lower abdominal or pelvic pain or pressure   Blood in the urine   Milky, cloudy, or pink/red urine   Fever   Strong smelling urine  In the elderly populations, symptoms of a urinary tract infection, can be easily overlooked, causing a delay in diagnosis. Elderly people with a UTI are more likely than younger people not to be diagnosed until the complication of sepsis occurs. The elderly often do not experience or report obvious symptoms that younger people have, such as difficult urination, or frequent urination. Instead they may exhibit far more vague symptoms that are similiar to many disease or may be assumed to be due to the aging process. These symptoms include: confusion, feelings of general discomfort,  disorientation, fatigue, weakness, behavior changes, falling, and/or a new, acute incontinence. In addition, because incontinence is common in the elderly, they may not be aware of the symptom of frequency. If you experience any of these symptoms, see your healthcare professional.  Types of UTIs   Cystitis - an inflammation of the bladder and the most common UTI. Almost always, it occurs in women. The infection typically affects only the surface of the bladder and is brief & acute.   Pyelonephritis - more commonly known as a kidney infection and usually occurs when a lower UTI spreads to the kidneys. Occasionally, bacteria will spread to the kidney from the bloodstresam. Kidney infections are more serious and less common than bladder infections. Symptoms include a fever, pain in the back or side below the ribs, nausea, or vomiting.   Urethritis - is an inflammation of the urethra. Men commonly contract urethritis through sexual intercourse with partners infected with sexually transmitted infections. Urethritis may also result from trauma to the area or from the catheterization process.  Prevention of UTI  There are several ways to prevent bladder infections.  Bathroom Behavior:Periodically emptying the bladder by trying to urinate every two to three hours.  Diet:The use of cranberry products seems to decrease the ability of bacteria to adhere to the lining of the urethra and bladder. As cranberry juice can have a high amount of sugar, cranberry extract can be taken in capsule or pill form instead. Increasing water intake by one or two glasses per day may help limit the length of time that you have symptoms and reduce the infections.  Hygiene: Proper hygiene in caring for the urethral area is another way to limit the amount or type of bacteria that can be drawn into the urethra. This is especially true in people who have decreased sensation in the perineal region such as those with MS or who experience any  amount of fecal incontinence. Using soap & water or commercially available cleansing wipes several times per day & frequent changing of incontinence pads as they become wet can minimize the amount of bacteria in the urethral area. Women should always wipe from the front of the vagina to the back of the anus after urination or a bowel movement and wear cotton underwear. It is also reported that wearing thong undergarments may increase one's risk of developing an infection.  Sexual Activity: Can be the source of UTIs in women. Insuring proper lubrication to the vagina and voiding before and after intercourse are tactics to help prevent infection. Using diaphragms and spermicidal jelly and/or foam may increase the risk of infection, so it is important to evaluate what type of birth control you use. Some physicians encourage women who have a history of recurrent infections to take an prophylactic antibiotic after intercourse, as it reduces risk of recurrent UTI by about 85%. At a minimum, restrict your number of sexual partners and urinate immediately after sexual intimacy to lower the risk of recurrent UTIs.  Vaginal Estrogen: reduces risk of recurrent UTI by repopulating the normal vaginal lactobacilli that keep bacteria from the rectum from multiplying and causing a bladder infection. Forms of vaginal estrogen are available at very low dosages that have minimal systemic absorption.  Catheter Use: proper cleansing and storage of catheters is important in one who intermittently catheterizes, as the catheter can be a vehicle to introduce infection if the technique is incorrect or if the catheters are not properly cleaned between each use. A closed system catheter provides a reliable means of sterile IC because the introducer tip is surrounded by a urine collection bag and never exposed to bacteria typically found at the urethral opening. This greatly reduces the risk of infection.  NOTE: Vaginal estrogens and  antibiotics are medications that need to be prescribed by your healthcare provider.  Treatment of UTI  Depending on the severity of infection, UTI can be treated with oral antibiotics. A three-day course of antibiotics can treat a simple UTI. However, some infections may need to be treated for several days or weeks. Length of antibiotic treatment also depends on the type of antibiotic prescribed.  Even if a few doses of medication relieve some symptoms, you should still complete the full course of medication prescribed by your doctor. Taking aspirin, Tylenol, or non-steroidal, anti-inflammatory medications may reduce symptoms during this episode, as they may be a result of increased body temperature.  For more information regarding UTIs please visit: http://www.ncbi.nlm.nih.gov/pubmedhealth/MYP9876779/    -------------------------------------------------------    1)  Mixed urinary incontinence, urge > stress:    --urine C&S  --trial of pessary  --control diabetes  --Empty bladder every 3 hours.  Empty well: wait a minute, lean forward on toilet.    --Avoid dietary irritants (see sheet).  Keep diary x 3-5 days to determine your irritants.  --consider PT in future   --URGE: consider medication (Mirabegron due to SBO).  Takes 2-4 weeks to see if will have effect.  For dry mouth: get sour, sugar free lozenge or gum.    --STRESS:  Pessary vs. Sling.     2)  Vaginal atrophy (dryness):  Use dime-sized amount of estrogen cream with finger around vaginal opening/inner lips nightly.     --continue osphena  --may also reduce bladder infection rate    3)  Vaginal irritation:  --probably due to wetness  --work on continence control  --change pads whenever wet/dirty  --can use cream from PCP daily or other barrier cream (Boudreax's or Desitin)--wipe off at night so can apply estrogen cream  --no vaseline     4)  Incomplete bladder emptying:  --see if pessary helps  --may be contributing to UTIs  --recheck PVR next visit    5)   Frequent UTIs (bladder infections):  --urine C&S sent today  --control diabetes  --work on improving bladder emptying  --continue Keflex 250 mg daily  --If you feel like you have a UTI, please call our office so that we can place an order for you to drop off a urine specimen at the closest [facility] lab (we will arrange).     --We will call in antibiotics for you to start right after you drop off specimen.     --In this way, we can determine:     1)  Do you have a UTI?      2) If you have a UTI, is it sensitive to the antibiotics we prescribed?   --follow UTI prevention tips (see attached)  --control bowel movements/fecal cross-contamination  --treat vaginal atrophy (dryness)  --empty bladder before and after intercourse  --consider need for further evaluation (pelvic imaging and cystoscopy) if issue persists; 618 CT A/P  normal    6)  Constipation intermittent with loose stool:  Controlling constipation may help bladder urgency/leakage and fiber may better control cholesterol and blood glucose.  Increas daily fiber.  Increase fiber by 1 tablet every 3-5 days until stool is easy to pass. Take fiber tablets with large glass of water.  When find place where stool is easy to pass and more well-formed, less loose/accidents,  stop and continue at that dose.   Do not exceed 6 tablets/day.    --May also use over the counter stool softener 1-2 x/day.  --continue use miralax daily--see if taking 1/2 dose is sufficient    7)  Elevated creatinine:  --seems acute and related to DM control/dehydrated  --hydrate well  --control diabetes  --follow up with Dr. Cox    8)  RTC 1 month.

## 2018-06-29 NOTE — LETTER
June 30, 2018      Rocio Mc MD  [address]  [address]  [facility] [place] [address]           [facility] [address] [place] [address]  Phone: [phone]          Patient: Anayeli Judd   MR Number: [id number]   YOB: 1945   Date of Visit: 6/29/2018       Dear Dr. Rocio Mc:    Thank you for referring Anayeli Judd to me for evaluation. Attached you will find relevant portions of my assessment and plan of care.    If you have questions, please do not hesitate to call me. I look forward to following Anayeli Judd along with you.    Sincerely,    Juanita Kuo MD    Enclosure  CC:  No Recipients    If you would like to receive this communication electronically, please contact [email or web address] or [phone] to request more information on [facility] access.    For providers and/or their staff who would like to refer a patient to [facility], please contact us through our one-stop-shop provider referral line, [facility], at [phone].    If you feel you have received this communication in error or would no longer like to receive these types of communications, please e-mail [email or web address]

## 2018-06-29 NOTE — PROGRESS NOTES
Call rec'd that pt just had blood drawn and was not feeling well.  Pt explained that her blood sugar had been running high at times.  Her last reading on her glucometer was 491 on 4/28 @ 1:45.  I tried rechecking blood sugar with clinic glucometer.  Unable to determine exact reading because specimen exceeded maximum.  Pt reports that she was going to Dr. Cox's office today to  samples of a new diabetes medication.  Dr. Mc's nurse was advised.  Recommended that pt go to their office and he will recheck blood sugar.  Pt's daughter will drive pt to her office.

## 2018-06-30 PROBLEM — R30.0 DYSURIA: Status: ACTIVE | Noted: 2018-06-30

## 2018-06-30 PROBLEM — R15.1 FECAL SOILING: Status: ACTIVE | Noted: 2018-06-30

## 2018-06-30 PROBLEM — R19.8 IRREGULAR BOWEL HABITS: Status: ACTIVE | Noted: 2018-06-30

## 2018-06-30 PROBLEM — R33.9 INCOMPLETE EMPTYING OF BLADDER: Status: ACTIVE | Noted: 2018-06-30

## 2018-06-30 PROBLEM — N95.2 VAGINAL ATROPHY: Status: ACTIVE | Noted: 2018-06-30

## 2018-06-30 PROBLEM — N39.0 FREQUENT UTI: Status: ACTIVE | Noted: 2018-06-30

## 2018-06-30 PROBLEM — R79.89 ELEVATED SERUM CREATININE: Status: ACTIVE | Noted: 2018-06-30

## 2018-06-30 PROBLEM — N89.8 VAGINAL IRRITATION: Status: ACTIVE | Noted: 2018-06-30

## 2018-06-30 PROBLEM — N39.46 MIXED STRESS AND URGE URINARY INCONTINENCE: Status: ACTIVE | Noted: 2018-06-30

## 2018-06-30 LAB — BACTERIA UR CULT: NO GROWTH

## 2018-07-17 ENCOUNTER — OFFICE VISIT (OUTPATIENT)
Dept: HEMATOLOGY/ONCOLOGY | Facility: CLINIC | Age: 73
End: 2018-07-17
Payer: MEDICARE

## 2018-07-17 ENCOUNTER — LAB VISIT (OUTPATIENT)
Dept: LAB | Facility: HOSPITAL | Age: 73
End: 2018-07-17
Attending: INTERNAL MEDICINE
Payer: MEDICARE

## 2018-07-17 VITALS
SYSTOLIC BLOOD PRESSURE: 112 MMHG | HEART RATE: 58 BPM | RESPIRATION RATE: 16 BRPM | HEIGHT: 64 IN | WEIGHT: 150.56 LBS | BODY MASS INDEX: 25.7 KG/M2 | OXYGEN SATURATION: 100 % | DIASTOLIC BLOOD PRESSURE: 58 MMHG | TEMPERATURE: 98 F

## 2018-07-17 DIAGNOSIS — Z85.038 HISTORY OF COLON CANCER: Primary | ICD-10-CM

## 2018-07-17 DIAGNOSIS — Z85.038 HISTORY OF COLON CANCER: ICD-10-CM

## 2018-07-17 LAB
ALBUMIN SERPL BCP-MCNC: 3.7 G/DL
ALP SERPL-CCNC: 62 U/L
ALT SERPL W/O P-5'-P-CCNC: 36 U/L
ANION GAP SERPL CALC-SCNC: 9 MMOL/L
AST SERPL-CCNC: 48 U/L
BILIRUB SERPL-MCNC: 0.8 MG/DL
BUN SERPL-MCNC: 12 MG/DL
CALCIUM SERPL-MCNC: 9.2 MG/DL
CEA SERPL-MCNC: 3.5 NG/ML
CHLORIDE SERPL-SCNC: 104 MMOL/L
CO2 SERPL-SCNC: 21 MMOL/L
CREAT SERPL-MCNC: 1.1 MG/DL
EST. GFR  (AFRICAN AMERICAN): 56.8 ML/MIN/1.73 M^2
EST. GFR  (NON AFRICAN AMERICAN): 49.2 ML/MIN/1.73 M^2
GLUCOSE SERPL-MCNC: 195 MG/DL
POTASSIUM SERPL-SCNC: 4.8 MMOL/L
PROT SERPL-MCNC: 7.5 G/DL
SODIUM SERPL-SCNC: 134 MMOL/L

## 2018-07-17 PROCEDURE — 80053 COMPREHEN METABOLIC PANEL: CPT

## 2018-07-17 PROCEDURE — 99215 OFFICE O/P EST HI 40 MIN: CPT | Mod: PBBFAC | Performed by: INTERNAL MEDICINE

## 2018-07-17 PROCEDURE — 36415 COLL VENOUS BLD VENIPUNCTURE: CPT

## 2018-07-17 PROCEDURE — 99212 OFFICE O/P EST SF 10 MIN: CPT | Mod: S$PBB,,, | Performed by: INTERNAL MEDICINE

## 2018-07-17 PROCEDURE — 99999 PR PBB SHADOW E&M-EST. PATIENT-LVL V: CPT | Mod: PBBFAC,,, | Performed by: INTERNAL MEDICINE

## 2018-07-17 PROCEDURE — 82378 CARCINOEMBRYONIC ANTIGEN: CPT

## 2018-07-17 NOTE — PROGRESS NOTES
"Subjective:       Patient ID: Anayeli Judd is a 73 y.o. female.    Chief Complaint: History of colon cancer  ONCOLOGIC HISTORY:    HPIMs. Judd is a 72-year-old female with a diagnosis of stage IIA transverse colon adenocarcinoma and underwent transverse colectomy on 6/27/11. She completed seven cycles of FOLFOX and then had to be taken off chemotherapy secondary to prolonged hospitalization and diarrhea.    She had a colonoscopy in 5/2013 was normal and a repeat was recommended in 5 years.             HPI She comes in to review labs  She feels well and denies any new issues. She however has been having issues with bowel obstruction   CT scans in 5/18 revealed "Chronic dilatation of two small bowel anastomotic sites within the mid lower abdomen and right upper quadrant.  Interval improvement in small bowel obstruction when compared to prior CT dated 04/17/2018. Hepatic steatosis. Postsurgical changes within the abdomen including bowel resection, cholecystectomy, and hysterectomy"  Colonoscopy in 7/17 revealed "One 5 mm polyp in the transverse colon, removed with a cold snare. Resected and retrieved. Two 2 to 3 mm polyps in the transverse colon removed with a jumbo cold forceps. Resected and retrieved.The examination was otherwise normal on direct and retroflexion views.Biopsies were taken with a cold forceps from the right colon and left colon for evaluation of microscopic colitis"        Review of Systems   Constitutional: Negative for appetite change, fatigue and unexpected weight change.   HENT: Negative for mouth sores.    Eyes: Negative for visual disturbance.   Respiratory: Negative for cough and shortness of breath.    Cardiovascular: Negative for chest pain.   Gastrointestinal: Negative for abdominal pain and diarrhea.   Genitourinary: Negative for frequency.   Musculoskeletal: Negative for back pain.   Skin: Negative for rash.   Neurological: Negative for headaches.   Hematological: Negative for " adenopathy.   Psychiatric/Behavioral: The patient is not nervous/anxious.    All other systems reviewed and are negative.      PMFSH: all information reviewed and updated as relevant to today's visit  Objective:      Physical Exam   Constitutional: She is oriented to person, place, and time. She appears well-developed and well-nourished.   HENT:   Mouth/Throat: No oropharyngeal exudate.   Cardiovascular: Normal rate and normal heart sounds.    Pulmonary/Chest: Effort normal and breath sounds normal. She has no wheezes.   Abdominal: Soft. Bowel sounds are normal. There is no tenderness.   Musculoskeletal: She exhibits no edema or tenderness.   Lymphadenopathy:     She has no cervical adenopathy.   Neurological: She is alert and oriented to person, place, and time. Coordination normal.   Skin: Skin is warm and dry. No rash noted.   Psychiatric: She has a normal mood and affect. Judgment and thought content normal.   Vitals reviewed.        LABS:  WBC   Date Value Ref Range Status   07/17/2018 5.83 3.90 - 12.70 K/uL Final     Hemoglobin   Date Value Ref Range Status   07/17/2018 10.5 (L) 12.0 - 16.0 g/dL Final     POC Hematocrit   Date Value Ref Range Status   05/24/2018 33 (L) 36 - 54 %PCV Final     Hematocrit   Date Value Ref Range Status   07/17/2018 33.8 (L) 37.0 - 48.5 % Final     Platelets   Date Value Ref Range Status   07/17/2018 114 (L) 150 - 350 K/uL Final     Gran # (ANC)   Date Value Ref Range Status   07/17/2018 3.0 1.8 - 7.7 K/uL Final     Comment:     The ANC is based on a white cell differential from an   automated cell counter. It has not been microscopically   reviewed for the presence of abnormal cells. Clinical   correlation is required.         Chemistry        Component Value Date/Time     (L) 07/17/2018 1155    K 4.8 07/17/2018 1155     07/17/2018 1155    CO2 21 (L) 07/17/2018 1155    BUN 12 07/17/2018 1155    CREATININE 1.1 07/17/2018 1155     (H) 07/17/2018 1155         Component Value Date/Time    CALCIUM 9.2 07/17/2018 1155    ALKPHOS 62 07/17/2018 1155    AST 48 (H) 07/17/2018 1155    ALT 36 07/17/2018 1155    BILITOT 0.8 07/17/2018 1155    ESTGFRAFRICA 56.8 (A) 07/17/2018 1155    EGFRNONAA 49.2 (A) 07/17/2018 1155        CEA: 3.5    Assessment:       1. History of colon cancer        Plan:        1. She is doing well with no evidence of recurrence and will return in 1 year with labs.    Above care plan was discussed with patient and all questions were addressed to her satisfaction

## 2018-07-18 ENCOUNTER — OFFICE VISIT (OUTPATIENT)
Dept: ENDOCRINOLOGY | Facility: CLINIC | Age: 73
End: 2018-07-18
Payer: MEDICARE

## 2018-07-18 VITALS
BODY MASS INDEX: 27.55 KG/M2 | WEIGHT: 149.69 LBS | DIASTOLIC BLOOD PRESSURE: 80 MMHG | SYSTOLIC BLOOD PRESSURE: 124 MMHG | HEART RATE: 72 BPM | HEIGHT: 62 IN

## 2018-07-18 DIAGNOSIS — E11.65 UNCONTROLLED TYPE 2 DIABETES MELLITUS WITH HYPERGLYCEMIA, WITHOUT LONG-TERM CURRENT USE OF INSULIN: Primary | ICD-10-CM

## 2018-07-18 DIAGNOSIS — R94.6 ABNORMAL THYROID FUNCTION TEST: ICD-10-CM

## 2018-07-18 PROCEDURE — 99999 PR PBB SHADOW E&M-EST. PATIENT-LVL III: CPT | Mod: PBBFAC,,, | Performed by: INTERNAL MEDICINE

## 2018-07-18 PROCEDURE — 99214 OFFICE O/P EST MOD 30 MIN: CPT | Mod: S$PBB,,, | Performed by: INTERNAL MEDICINE

## 2018-07-18 PROCEDURE — 99213 OFFICE O/P EST LOW 20 MIN: CPT | Mod: PBBFAC | Performed by: INTERNAL MEDICINE

## 2018-07-18 RX ORDER — METFORMIN HYDROCHLORIDE 500 MG/1
500 TABLET, EXTENDED RELEASE ORAL 2 TIMES DAILY WITH MEALS
Qty: 60 TABLET | Refills: 11 | Status: SHIPPED | OUTPATIENT
Start: 2018-07-18 | End: 2018-08-15

## 2018-07-18 NOTE — PATIENT INSTRUCTIONS
Continue metformin 750 mg one tablet daily.     When you run out:  Please start metformin 500 mg XR one tablet twice a day  Repeat blood work (kidney function) in two weeks.     Labs and a follow up visit in three months.

## 2018-07-18 NOTE — LETTER
July 23, 2018      Rocio Mc MD  [address]  [address]  [facility] [address]           [facility] Endo/Diab[facility]Metab  [address]  [facility] [address]  Phone: [phone]  Fax: [phone]          Patient: Anayeli Judd   MR Number: [id number]   YOB: 1945   Date of Visit: 7/18/2018       Dear Dr. Rocio Mc:    Thank you for referring Anayeli Judd to me for evaluation. Attached you will find relevant portions of my assessment and plan of care.    If you have questions, please do not hesitate to call me. I look forward to following Anayeli Judd along with you.    Sincerely,    Nenita Pastor MD    Enclosure  CC:  No Recipients    If you would like to receive this communication electronically, please contact [email or web address] or [phone] to request more information on [employer] access.    For providers and/or their staff who would like to refer a patient to [facility], please contact us through our one-stop-shop provider referral line, [facility], at [phone].    If you feel you have received this communication in error or would no longer like to receive these types of communications, please e-mail [email or web address]

## 2018-07-18 NOTE — PROGRESS NOTES
Subjective:     Patient ID: Anayeli Judd is a 73 y.o. female.    Chief Complaint: Type II Diabetes Mellitus    HPI:   Ms. Judd is a 73 y.o. female with a hx of colon cancer s/p partial colectomy who is here for a consult visit for evaluation for type 2 diabetes management, this was diagnosed 5 years ago. She states the diabetes is due to long term steroid use from musculoskeletal pain treatment and Colon Cancer complications. Patient reports having multiple UTI's due to bladder wall prolapse, which was increasing her urinary frequency but symptoms have decreased since she got a pessary.  She reports that last week her blood glucose was in the 500's, at that time she experienced blurred vision and dizziness. She went to the ED where she was given IV fluid resuscitation. She states that she has been working hard to follow a diabetic diet and keeps a daily log of her blood sugar however it is difficult to do when she eats out at  restaurants.   Currently denies nocturia, polyuria, unexplained weight loss or blurred vision & foot paresthesias. Her main concern today is inability to keep her sugars under control, per her blood sugar logs her highest records (200's) are usually before lunch Last HbA1C - 8.7: 5/18      With regards to her Thyroid  Pt has a history of hypothyroid. She takes Synthriod 100 po.  She complains of hair thinning, hair loss and brittle nails which have been on going for the past year. She denies any difficulty swallowing or change in voice.  Family Hx of Thyroid Cancer: Brother & Niece.      Diabetes medications include:  Metformin 750 mg xr one tab twice a day  linagliptin 5 mg   Denies any side effects:None  Denies hypoglycemic episodes or symptoms.      Diabetes complications: None  Last eye evaluation- 4/11/18  Denies numbness, tingling sensation, symptomatic CAD or CVD.    Last urine test- 6/28/28: Normal   Hospitalizations for hyper- or hypo- glycemia: None.    24-h dietary  recall:  Breakfast - oatmeal & strawberries  Lunch - salad, grilled chicken  Dinner - Egg sandwich   Snacks - blueberries, strawberries    Reports following a diabetes diet, cutting down on sweets, sweeteners and soda's.    Diabetes education: Sees a dietician adam    SMBG: Tests BG times a day - 2-3 times a day  FB, 141,174, 139, 156, 169, 172 , 134  Pre-lunch: 248, 242, 180, 164, 201, 149, 132, 167, 200, 214  Pre-dinner:121, 201, 158, 182, 195  Bedtime:119, 195, 188, 127, 216    Exercise: no formal exercise    ADA STANDARDS of CARE:        ACE inhibitor of angiotensin II receptor blocker: Not taking        Statin drug: Atorvastatin 40mg PO daily        Low dose ASA:  Does not take any         Eye exam within last year: 2018        Dental exam:  2018        Flu shot: N/A        Pneumonia vaccine:          Family History of T2DM: None    Review of Systems   Constitutional: Positive for fatigue. Negative for appetite change.   HENT: Negative for trouble swallowing and voice change.    Respiratory: Negative for choking, chest tightness, shortness of breath and wheezing.    Cardiovascular: Negative for chest pain and palpitations.   Gastrointestinal: Negative for abdominal distention, abdominal pain, diarrhea and nausea.   Endocrine: Positive for cold intolerance. Negative for polydipsia, polyphagia and polyuria.   Genitourinary: Negative for difficulty urinating and dysuria.   Neurological: Negative for syncope and weakness.   Psychiatric/Behavioral: Negative for confusion.     Objective:     Physical Exam   HENT:   Head: Normocephalic and atraumatic.   Neck: Normal range of motion. No thyromegaly present.   Cardiovascular: Normal rate, regular rhythm and normal heart sounds.    Pulmonary/Chest: Effort normal and breath sounds normal. No respiratory distress. She has no wheezes.   Abdominal: Soft. Bowel sounds are normal.   Neurological: She is alert.   Skin: Skin is warm and dry.       Vitals  BP:  "124/80 Pulse: 72   Height: 5' 2" (1.575 m)       Weight: 67.9 kg (149 lb 11.1 oz          Results for MAXI ROBERTS (MRN [id number]) as of 7/18/2018 11:05   Ref. Range 3/30/2017 23:04 6/12/2017 12:43 10/23/2017 15:12 2/20/2018 08:33 5/16/2018 09:33   Hemoglobin A1C Latest Ref Range: 4.0 - 5.6 % 6.6 (H) 5.8 (H) 6.5 (H) 6.8 (H) 8.7 (H)       Results for MAXI ROBERTS (MRN [id number]) as of 7/18/2018 11:05   Ref. Range 5/25/2018 05:28 6/28/2018 13:30 6/28/2018 17:35 6/28/2018 18:35 7/17/2018 11:55   Sodium Latest Ref Range: 136 - 145 mmol/L 142 132 (L) 135 (L)  134 (L)   Potassium Latest Ref Range: 3.5 - 5.1 mmol/L 3.8 5.0 4.6  4.8   Chloride Latest Ref Range: 95 - 110 mmol/L 112 (H) 99 102  104   CO2 Latest Ref Range: 23 - 29 mmol/L 22 (L) 21 (L) 21 (L)  21 (L)   Anion Gap Latest Ref Range: 8 - 16 mmol/L 8 12 12  9   BUN, Bld Latest Ref Range: 8 - 23 mg/dL 11 17 17  12   Creatinine Latest Ref Range: 0.5 - 1.4 mg/dL 0.7 1.4 1.3  1.1   eGFR if non African American Latest Ref Range: >60 mL/min/1.73 m^2 >60.0 36.8 (A) 40.2 (A)  49.2 (A)   eGFR if African American Latest Ref Range: >60 mL/min/1.73 m^2 >60.0 42.4 (A) 46.4 (A)  56.8 (A)   Glucose Latest Ref Range: 70 - 110 mg/dL 131 (H) 453 (HH) 376 (H)  195 (H)   Calcium Latest Ref Range: 8.7 - 10.5 mg/dL 8.7 9.4 9.5  9.2   Phosphorus Latest Ref Range: 2.7 - 4.5 mg/dL 3.8       Magnesium Latest Ref Range: 1.6 - 2.6 mg/dL 1.2 (L)       Alkaline Phosphatase Latest Ref Range: 55 - 135 U/L   72  62   Total Protein Latest Ref Range: 6.0 - 8.4 g/dL   7.4  7.5   Albumin Latest Ref Range: 3.5 - 5.2 g/dL   3.6  3.7   Total Bilirubin Latest Ref Range: 0.1 - 1.0 mg/dL   0.7  0.8   AST Latest Ref Range: 10 - 40 U/L   45 (H)  48 (H)   ALT Latest Ref Range: 10 - 44 U/L   33  36   Lipase Latest Ref Range: 4 - 60 U/L   50         Assessment/Plan:     Type II Diabetes Mellitus   - Increase Metformin to 500 mg PO BID, will consider increase if patient tolerates  - Continue Linagliptin 5 " mg PO  - Order HbA1C for 3 month follow up  - Order BMP to monitor renal function, will call pt on results in 2-4 wks  - Informed pt on safety and side effect of metformin.  - Educated pt on compliance with diabetic diet.    Hypothyroidism  - Continue Synthroid 100 mcg   - Order TSH    Will increase metformin just a little bit from 750 to 500 mg bid. If tolerates will consider another increase, but we may be limited by CKD. Discussed dietary modification. Consider basal insulin at some point.     I have personally taken the history and examined this patient and agree with the resident's note as stated above.

## 2018-07-31 NOTE — PROGRESS NOTES
"Urogyn follow up  2018    [facility] [address] [place] [address]     Hardik Judd  [id number]  1945      Anayeli Judd is a 73 y.o.  here for a urogyn follow up.    1)  UI:  (+) MAYKEL (sneeze, cough)  = (+) UUI (1st AM)  X years but worse after CRS.  (+) pads (liners or thick, depending on how in control she feels):10/day, usually moderate wetness and keeps pad by bed (does not wear one to "air out"), uses pad on way to BR--usually has to change clothes at night.   Daytime frequency: Q 2 hours.  Nocturia: Yes: 4/night.   (--) dysuria,  (--) hematuria,  (+) frequent UTIs since CRS surgery.  Currently on daily Keflex 250 mg daily--followed by [facility].   (+) complete bladder emptying.    2)  POP:  Absent. (--) vaginal bleeding. (--) vaginal discharge. (--) sexually active.  (--) dyspareunia-- has been .  (+)  Vaginal dryness--taking osphena, which was helping but thinks that she is dry due to excessive cleaning.  (--) vaginal estrogen use--tried in past but didn't help.   --POP-Q:  Aa 0; Ba 0; C -8; Ap -1; Bp -1.  Genital hiatus 3, perineal body 2, total vaginal length 9.  --PVR: 120 mL (without pessary)  --The patient was fit with #1 UI + POP pessary (unable to empty bladder); #2 UI + POP too large; 2 ring with support pessary ok & able to empty.  She was able to tolerate the device comfortabley with bending, squatting, valsalva.  She was not able to demonstrate independent removal and placement.  She tolerated the procedure well.    Voiding trial  The patient was placed in dorsal lithotomy position with feet in stirrups.  The urethra was prepped x 2 with betadine.  A red rubber catheter was used to drain the bladder.  The bladder was filled with 300 mL sterile water to gravity with a 60 mL kam tipped syringe, at which point she voiced a strong desire to void.  The catheter was removed. She was asked to cough with (--) leak. She was allowed to " void, with 310 mL produced.  She tolerated the procedure well.  Impression: (--) MAYKEL with pessary; +complete bladder emptying with pessary    3)  BM: h/o colon CA s/p sigmoidectomy.  Had some postop anal leakage. Doesn't always feel when this happens.  This has increased UTI frequency.  Stays on BRAT diet to help stool consistency.  Takes fibercon 2 in AM and miralax every AM.   (--) constipation/straining.  (+) chronic diarrhea. (--) hematochezia.  (+) fecal incontinence, loose.  (--) fecal smearing/urgency.  (+) complete evacuation but unsure because has frequent BMs.   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 Ward with extensive TIFFANY (>2h) and multiple enterotomies.  S/p chemo.  No radTx. Has had recurrent SBOs.  Last was 18.  Usually gets better with bowel rest and contrast study.    --Patient states that since last year she has been dealing with intermittent abdominal pain due to her scar tissue for which she has been seeing Dr. Erazo     Past Surgical History:   Procedure Laterality Date    APPENDECTOMY      BACK SURGERY      CARPAL TUNNEL RELEASE      bilateral      SECTION      CHOLECYSTECTOMY  1965    COLECTOMY  2011    Transverse colon resection by Dr. Aguirre    COLONOSCOPY N/A 2017    Procedure: COLONOSCOPY;  Surgeon: Manjit Alvarez MD;  Location: [address]);  Service: Endoscopy;  Laterality: N/A;    EYE SURGERY      Cataract Removal    HYSTERECTOMY      posterolateral fusion with autograft bone and [place] mineralized bone matrix  13    [facility] stenosis    TOE SURGERY      TONSILLECTOMY      TRIGGER FINGER RELEASE         Past Medical History  Past Medical History:   Diagnosis Date    Allergic rhinitis     Arthritis     Blood transfusion     during delivery and     Bowel obstruction      Cervical radiculopathy     followed by dr cloud    Colon cancer 2011    transverse colon; resected; Stage IIA (pT3 pN0 MX)    Diarrhea     Family history of breast cancer     Family history of colon cancer     Fatty liver     GERD (gastroesophageal reflux disease)     History of shingles     Hyperlipidemia     Hypothyroidism     Irritable bowel syndrome     Microscopic colitis     treated 2013    Raynaud phenomenon     Raynaud's disease     Type 2 diabetes mellitus      DM2:   Hemoglobin A1C   Date Value Ref Range Status   05/16/2018 8.7 (H) 4.0 - 5.6 % Final     Comment:     According to ADA guidelines, hemoglobin A1c <7.0% represents  optimal control in non-pregnant diabetic patients. Different  metrics may apply to specific patient populations.   Standards of Medical Care in Diabetes-2016.  For the purpose of screening for the presence of diabetes:  <5.7%     Consistent with the absence of diabetes  5.7-6.4%  Consistent with increasing risk for diabetes   (prediabetes)  >or=6.5%  Consistent with diabetes  Currently, no consensus exists for use of hemoglobin A1c  for diagnosis of diabetes for children.  This Hemoglobin A1c assay has significant interference with fetal   hemoglobin   (HbF). The results are invalid for patients with abnormal amounts of   HbF,   including those with known Hereditary Persistence   of Fetal Hemoglobin. Heterozygous hemoglobin variants (HbAS, HbAC,   HbAD, HbAE, HbA2) do not significantly interfere with this assay;   however, presence of multiple variants in a sample may impact the %   interference.     02/20/2018 6.8 (H) 4.0 - 5.6 % Final     Comment:     According to ADA guidelines, hemoglobin A1c <7.0% represents  optimal control in non-pregnant diabetic patients. Different  metrics may apply to specific patient populations.   Standards of Medical Care in Diabetes-2016.  For the purpose of screening for the presence of diabetes:  <5.7%     Consistent with the absence  of diabetes  5.7-6.4%  Consistent with increasing risk for diabetes   (prediabetes)  >or=6.5%  Consistent with diabetes  Currently, no consensus exists for use of hemoglobin A1c  for diagnosis of diabetes for children.  This Hemoglobin A1c assay has significant interference with fetal   hemoglobin   (HbF). The results are invalid for patients with abnormal amounts of   HbF,   including those with known Hereditary Persistence   of Fetal Hemoglobin. Heterozygous hemoglobin variants (HbAS, HbAC,   HbAD, HbAE, HbA2) do not significantly interfere with this assay;   however, presence of multiple variants in a sample may impact the %   interference.     10/23/2017 6.5 (H) 4.0 - 5.6 % Final     Comment:     According to ADA guidelines, hemoglobin A1c <7.0% represents  optimal control in non-pregnant diabetic patients. Different  metrics may apply to specific patient populations.   Standards of Medical Care in Diabetes-2016.  For the purpose of screening for the presence of diabetes:  <5.7%     Consistent with the absence of diabetes  5.7-6.4%  Consistent with increasing risk for diabetes   (prediabetes)  >or=6.5%  Consistent with diabetes  Currently, no consensus exists for use of hemoglobin A1c  for diagnosis of diabetes for children.  This Hemoglobin A1c assay has significant interference with fetal   hemoglobin   (HbF). The results are invalid for patients with abnormal amounts of   HbF,   including those with known Hereditary Persistence   of Fetal Hemoglobin. Heterozygous hemoglobin variants (HbAS, HbAC,   HbAD, HbAE, HbA2) do not significantly interfere with this assay;   however, presence of multiple variants in a sample may impact the %   interference.         Past Surgical History  Past Surgical History:   Procedure Laterality Date    APPENDECTOMY      BACK SURGERY      CARPAL TUNNEL RELEASE      bilateral      SECTION      CHOLECYSTECTOMY  1965    COLECTOMY  2011    Transverse colon resection  by Dr. Aguirre    COLONOSCOPY N/A 2017    Procedure: COLONOSCOPY;  Surgeon: Manjit Alvarez MD;  Location: [facility] ([address] [place] [address]);  Service: Endoscopy;  Laterality: N/A;    EYE SURGERY      Cataract Removal    HYSTERECTOMY      posterolateral fusion with autograft bone and [place] mineralized bone matrix  13    at [facility] for lumbar spine stenosis    TOE SURGERY      TONSILLECTOMY      TRIGGER FINGER RELEASE     RUQ appy + venus  vert xlap for partial colectomy    Hysterectomy: Yes   Date: 29 yo.  Indication: abnl paps.    Type: xlap  Cervix present: No  Ovaries present: No  Other procedures at time of hysterectomy: none    Past Ob History     x 2.  C/s x 1.    Largest infant weight: 7#  yes FAVD. yes episiotomy.      Gynecologic History  LMP: No LMP recorded (lmp unknown). Patient has had a hysterectomy.  Age of menarche: 8 yo  Age of menopause: with ANGEL  Menstrual history: h/o menorrhagia  Pap test: post ANGEL.  History of abnormal paps: Yes--had ANGEL.  History of STIs:  No  Mammogram: Date of last: .  Result: Normal per report ([facility])  Colonoscopy: Date of last: .  Result:  Benign polyps.  Repeat due:  .    DEXA:  Date of last: .  Result:  Normal.    Issues include:  Patient Active Problem List   Diagnosis    Lumbar spondylosis    Cervical spondylosis with radiculopathy    Insomnia    Carpal tunnel syndrome    Headache(784.0)    Hypothyroid    History of colon cancer    Irritable bowel syndrome    Chronic sinusitis of right maxilla    Diarrhea    Pelvic muscle wasting    GERD (gastroesophageal reflux disease)    Fatty liver    Partial small bowel obstruction    Family history of breast cancer    Numbness of face    Gait disturbance    Type 2 diabetes mellitus without complication, without long-term current use of insulin    Acute deep vein thrombosis (DVT) of upper extremity    Wound infection after surgery    Hematoma    Chronic diarrhea    Fecal incontinence  "   Abdominal pain    Nausea    Vitamin B12 deficiency    Vaginal atrophy    Incomplete emptying of bladder    Mixed stress and urge urinary incontinence    Vaginal irritation    Frequent UTI    Fecal soiling    Irregular bowel habits    Elevated serum creatinine       History since last visit:    1)  Mixed urinary incontinence, urge > stress:    --urine C&S  --s/p PT in past--years ago--didn't feel like it helped  --trial of pessary:  Helping a lot; feels ok living like this.   Is having some UUI on way to BR.  Going through same amount of pads during day but with min wetness.  Wearing pads at night instead of wetting clothes.    --baseline:   (+) pads (liners or thick, depending on how in control she feels):10/day, usually moderate wetness and keeps pad by bed (does not wear one to "air out"), uses pad on way to BR--usually has to change clothes at night.   Daytime frequency: Q 2 hours.  --controlling diabetes    2)  Vaginal atrophy (dryness):  Use dime-sized amount of estrogen cream with finger around vaginal opening/inner lips nightly.     --continue osphena  --may also reduce bladder infection rate    3)  Vaginal irritation:  --felt like she had yeast infection recently--had steroid shot Monday, which helped  --probably due to wetness  --work on continence control  --change pads whenever wet/dirty  --can use cream from PCP daily or other barrier cream (Boudreax's or Desitin)--wipe off at night so can apply estrogen cream  --no vaseline     4)  Incomplete bladder emptying:  --can't tell if pessary helping; does have moderate 2nd void volumes by straining.  --may be contributing to UTIs  --recheck PVR next visit    5)  Frequent UTIs (bladder infections):  --has not had recent symptoms  --urine C&S 6/29/18 NEG  --control diabetes  --work on improving bladder emptying  --continues Keflex 250 mg daily  --If you feel like you have a UTI, please call our office so that we can place an order for you to drop " off a urine specimen at the closest [facility] lab (we will arrange).     --We will call in antibiotics for you to start right after you drop off specimen.     --In this way, we can determine:     1)  Do you have a UTI?      2) If you have a UTI, is it sensitive to the antibiotics we prescribed?   --follow UTI prevention tips (see attached)  --control bowel movements/fecal cross-contamination  --treat vaginal atrophy (dryness)  --empty bladder before and after intercourse  --consider need for further evaluation (pelvic imaging and cystoscopy) if issue persists; 618 CT A/P  normal    6)  Constipation intermittent with loose stool:  --better with 4-5 tsps/day  --not needing stool softener  --continue use miralax daily--see if taking 1/2 dose is sufficient    7)  Elevated creatinine:  --seems acute and related to DM control/dehydrated; repeat 7/17 was 1.1  --hydrate well  --control diabetes  --follow up with Dr. Cox    Medications:    Current Outpatient Prescriptions:     atorvastatin (LIPITOR) 40 MG tablet, take 1 tablet by mouth once daily, Disp: 90 tablet, Rfl: 1    biotin 5,000 mcg TbDL, Take by mouth., Disp: , Rfl:     blood sugar diagnostic Strp, Dispense neil contour strips; test blood sugar once daily, Disp: 100 strip, Rfl: 11    blood-glucose meter (CONTOUR METER) kit, Please dispense Modulus Video Contour meter and supplies Use as instructed Test Blood sugar once daily, Disp: 1 each, Rfl: 0    cephALEXin (KEFLEX) 250 MG capsule, Take 1 capsule (250 mg total) by mouth once daily., Disp: 30 capsule, Rfl: 5    conjugated estrogens (PREMARIN) vaginal cream, Place 0.5 g vaginally every evening., Disp: 30 g, Rfl: 11    diphenhydrAMINE (BENADRYL) 25 mg capsule, Take 1 each (25 mg total) by mouth nightly as needed for Itching, Allergies or Insomnia., Disp: , Rfl: 0    fluconazole (DIFLUCAN) 150 MG Tab, , Disp: , Rfl: 0    fluticasone (FLONASE) 50 mcg/actuation nasal spray, 2 sprays by Each Nare route once  daily., Disp: , Rfl: 0    FREESTYLE EFREN READER Misc, TEST as directed, Disp: , Rfl: 0    FREESTYLE EFREN SENSOR Kit, , Disp: , Rfl: 0    gabapentin (NEURONTIN) 600 MG tablet, Take 600 mg by mouth 2 (two) times daily., Disp: , Rfl:     hydrocortisone 2.5 % cream, , Disp: , Rfl: 0    ipratropium (ATROVENT) 0.06 % nasal spray, , Disp: , Rfl: 0    lancets (ONE TOUCH ULTRASOFT LANCETS) Misc, Test blood sugar once daily, Disp: 200 each, Rfl: 11    levothyroxine (SYNTHROID) 100 MCG tablet, Take 100 mcg by mouth once daily., Disp: , Rfl:     linagliptin (TRADJENTA) 5 mg Tab tablet, Take 1 tablet (5 mg total) by mouth once daily., Disp: 90 tablet, Rfl: 3    magnesium 30 mg Tab, Take 500 capsules by mouth. Patient's taking magnesium 500mg QD, Disp: , Rfl:     meloxicam (MOBIC) 15 MG tablet, Take 1 tablet (15 mg total) by mouth daily as needed for Pain. With food for arthritis, Disp: 90 tablet, Rfl: 1    metFORMIN (GLUCOPHAGE-XR) 500 MG 24 hr tablet, Take 1 tablet (500 mg total) by mouth 2 (two) times daily with meals., Disp: 60 tablet, Rfl: 11    metronidazole 0.75% (METROCREAM) 0.75 % Crea, , Disp: , Rfl: 0    ondansetron (ZOFRAN) 8 MG tablet, Take 1 tablet (8 mg total) by mouth every 8 (eight) hours as needed for Nausea., Disp: 20 tablet, Rfl: 0    ospemifene (OSPHENA) 60 mg Tab, Take 60 mg by mouth once daily., Disp: 90 tablet, Rfl: 1    pantoprazole (PROTONIX) 40 MG tablet, Take 1 tablet (40 mg total) by mouth once daily., Disp: 30 tablet, Rfl: 3    polyethylene glycol (GLYCOLAX) 17 gram/dose powder, Take 17 g by mouth once daily., Disp: , Rfl: 0    triamcinolone acetonide 0.1% (KENALOG) 0.1 % paste, apply to affected area with A Q-TIP three times a day, Disp: , Rfl: 0    VIT A/VIT C/VIT E/ZINC/COPPER (ICAPS AREDS ORAL), Take by mouth 2 (two) times daily., Disp: , Rfl:     vitamin D 1000 units Tab, Take 185 mg by mouth once daily. D3, Disp: , Rfl:     azelastine (ASTELIN) 137 mcg nasal spray, 1 spray  "(137 mcg total) by Nasal route 2 (two) times daily., Disp: 30 mL, Rfl: 1    mirabegron 50 mg Tb24, Take 1 tablet (50 mg total) by mouth once daily., Disp: 30 tablet, Rfl: 11    ROS:  As per HPI.      Exam  /60 (BP Location: Right arm, Patient Position: Sitting, BP Method: Medium (Manual))   Ht 5' 2" (1.575 m)   Wt 66.2 kg (145 lb 15.1 oz)   LMP  (LMP Unknown)   BMI 26.69 kg/m²   General: alert and oriented, no acute distress  Respiratory: normal respiratory effort  Abd: soft, non-tender, non-distended    Pelvic  Ext. Genitalia: normal external genitalia. Normal bartholin's and skenes glands  Vagina: #2 ring + support in place, removed/cleaned. min atrophy. Normal vaginal mucosa without lesions. No discharge noted.   Non-tender bladder base without palpable mass.  Cervix: absent  Uterus:  uterus is normal size, shape, consistency and nontender, surgically absent, vaginal cuff well healed   Urethra: no masses or tenderness  Urethral meatus: no lesions, caruncle or prolapse.  Pessary replaced without issue.     --POP-Q:  Aa 0; Ba 0; C -8; Ap -1; Bp -1.  Genital hiatus 3, perineal body 2, total vaginal length 9.  PVR 50 mL    Impression  1. Frequent UTI  Urine culture   2. Urinary incontinence, mixed  mirabegron 50 mg Tb24   3. Incomplete emptying of bladder     4. Mixed stress and urge urinary incontinence     5. Pelvic muscle wasting     6. Vaginal atrophy     7. Vaginal irritation     8. Chronic diarrhea     9. Irregular bowel habits       We reviewed the above issues and discussed options for short-term versus long-term management of her problems.   Plan:   1)  Mixed urinary incontinence, urge > stress:    --urine C&S  --continue pessary use  --control diabetes  --Empty bladder every 3 hours.  Empty well: wait a minute, lean forward on toilet.    --Avoid dietary irritants (see sheet).  Keep diary x 3-5 days to determine your irritants.  --consider PT in future   --URGE: start Mirabegron 50 mg daily  (due " to SBO). If too expensive, please call to let me know.  Takes 2-4 weeks to see if will have effect.  For dry mouth: get sour, sugar free lozenge or gum.    --STRESS:  Pessary vs. Sling.     2)  Vaginal atrophy (dryness):  TWICE A WEEK: use dime-sized amount of estrogen cream with finger around vaginal opening/inner lips.    --continue osphena  --may also reduce bladder infection rate    3)  Vaginal irritation:  --probably due to wetness  --work on continence control  --change pads whenever wet/dirty  --can use cream from PCP daily or other barrier cream (Boudreax's or Desitin)--wipe off at night so can apply estrogen cream  --no vaseline     4)  Incomplete bladder emptying:  --pessary helped; PVR improved  --may be contributing to UTIs    5)  Frequent UTIs (bladder infections):  --urine C&S sent today; if NEG, consider stopping Keflex  --control diabetes  --work on improving bladder emptying  --continue Keflex 250 mg daily  --If you feel like you have a UTI, please call our office so that we can place an order for you to drop off a urine specimen at the closest [facility] lab (we will arrange).     --We will call in antibiotics for you to start right after you drop off specimen.     --In this way, we can determine:     1)  Do you have a UTI?      2) If you have a UTI, is it sensitive to the antibiotics we prescribed?   --follow UTI prevention tips (see attached)  --control bowel movements/fecal cross-contamination  --treat vaginal atrophy (dryness)  --empty bladder before and after intercourse  --consider need for further evaluation (pelvic imaging and cystoscopy) if issue persists; 618 CT A/P  normal    6)  Constipation intermittent with loose stool:  --continue daily fiber supplements  --May also use over the counter stool softener 1-2 x/day.  --continue use miralax daily--see if taking 1/2 dose is sufficient    7)  Elevated creatinine:  --seems acute and related to DM control/dehydrated; repeat 7/2018  1.1  --hydrate well  --control diabetes  --follow up with Dr. Cox    8)  Stage 2 cystocele/rectocele:  --continue pessary use for now  --consider surgery (vaginal A&P)--recheck apex before surgery, as was ok; h/o mult abd surgeries    9)  RTC 3 months.     40 minutes were spent in face to face time with this patient  90 % of this time was spent in counseling and/or coordination of care     Juanita Kuo MD  [facility]  Division of Female Pelvic Medicine and Reconstructive Surgery  Department of Obstetrics & Gynecology

## 2018-08-01 ENCOUNTER — OFFICE VISIT (OUTPATIENT)
Dept: UROGYNECOLOGY | Facility: CLINIC | Age: 73
End: 2018-08-01
Payer: MEDICARE

## 2018-08-01 VITALS
HEIGHT: 62 IN | SYSTOLIC BLOOD PRESSURE: 112 MMHG | WEIGHT: 145.94 LBS | DIASTOLIC BLOOD PRESSURE: 60 MMHG | BODY MASS INDEX: 26.86 KG/M2

## 2018-08-01 DIAGNOSIS — N89.8 VAGINAL IRRITATION: ICD-10-CM

## 2018-08-01 DIAGNOSIS — R19.8 IRREGULAR BOWEL HABITS: ICD-10-CM

## 2018-08-01 DIAGNOSIS — K52.9 CHRONIC DIARRHEA: ICD-10-CM

## 2018-08-01 DIAGNOSIS — N39.46 MIXED STRESS AND URGE URINARY INCONTINENCE: ICD-10-CM

## 2018-08-01 DIAGNOSIS — N95.2 VAGINAL ATROPHY: ICD-10-CM

## 2018-08-01 DIAGNOSIS — N39.0 FREQUENT UTI: Primary | ICD-10-CM

## 2018-08-01 DIAGNOSIS — N39.46 URINARY INCONTINENCE, MIXED: ICD-10-CM

## 2018-08-01 DIAGNOSIS — R33.9 INCOMPLETE EMPTYING OF BLADDER: ICD-10-CM

## 2018-08-01 DIAGNOSIS — N81.84 PELVIC MUSCLE WASTING: ICD-10-CM

## 2018-08-01 PROBLEM — R30.0 DYSURIA: Status: RESOLVED | Noted: 2018-06-30 | Resolved: 2018-08-01

## 2018-08-01 PROCEDURE — 99214 OFFICE O/P EST MOD 30 MIN: CPT | Mod: 25,S$PBB,, | Performed by: OBSTETRICS & GYNECOLOGY

## 2018-08-01 PROCEDURE — 87086 URINE CULTURE/COLONY COUNT: CPT

## 2018-08-01 PROCEDURE — 51701 INSERT BLADDER CATHETER: CPT | Mod: PBBFAC | Performed by: OBSTETRICS & GYNECOLOGY

## 2018-08-01 PROCEDURE — 51701 INSERT BLADDER CATHETER: CPT | Mod: S$PBB,,, | Performed by: OBSTETRICS & GYNECOLOGY

## 2018-08-01 PROCEDURE — 99213 OFFICE O/P EST LOW 20 MIN: CPT | Mod: PBBFAC | Performed by: OBSTETRICS & GYNECOLOGY

## 2018-08-01 PROCEDURE — 99999 PR PBB SHADOW E&M-EST. PATIENT-LVL III: CPT | Mod: PBBFAC,,, | Performed by: OBSTETRICS & GYNECOLOGY

## 2018-08-01 RX ORDER — DOXYCYCLINE 100 MG/1
100 CAPSULE ORAL 2 TIMES DAILY
Refills: 0 | COMMUNITY
Start: 2018-05-18 | End: 2018-08-01

## 2018-08-01 RX ORDER — TRIAMCINOLONE ACETONIDE 1 MG/G
PASTE DENTAL
Refills: 0 | COMMUNITY
Start: 2018-07-09 | End: 2020-08-13

## 2018-08-01 RX ORDER — FLUCONAZOLE 150 MG/1
TABLET ORAL
Refills: 0 | COMMUNITY
Start: 2018-07-24 | End: 2018-10-31 | Stop reason: ALTCHOICE

## 2018-08-01 NOTE — PATIENT INSTRUCTIONS
1)  Mixed urinary incontinence, urge > stress:    --urine C&S  --continue pessary use  --control diabetes  --Empty bladder every 3 hours.  Empty well: wait a minute, lean forward on toilet.    --Avoid dietary irritants (see sheet).  Keep diary x 3-5 days to determine your irritants.  --consider PT in future   --URGE: start Mirabegron 50 mg daily  (due to SBO). If too expensive, please call to let me know.  Takes 2-4 weeks to see if will have effect.  For dry mouth: get sour, sugar free lozenge or gum.    --STRESS:  Pessary vs. Sling.     2)  Vaginal atrophy (dryness):  TWICE A WEEK: use dime-sized amount of estrogen cream with finger around vaginal opening/inner lips.    --continue osphena  --may also reduce bladder infection rate    3)  Vaginal irritation:  --probably due to wetness  --work on continence control  --change pads whenever wet/dirty  --can use cream from PCP daily or other barrier cream (Boudreax's or Desitin)--wipe off at night so can apply estrogen cream  --no vaseline     4)  Incomplete bladder emptying:  --pessary helped; PVR improved  --may be contributing to UTIs    5)  Frequent UTIs (bladder infections):  --urine C&S sent today; if NEG, consider stopping Keflex  --control diabetes  --work on improving bladder emptying  --continue Keflex 250 mg daily  --If you feel like you have a UTI, please call our office so that we can place an order for you to drop off a urine specimen at the closest [facility] lab (we will arrange).     --We will call in antibiotics for you to start right after you drop off specimen.     --In this way, we can determine:     1)  Do you have a UTI?      2) If you have a UTI, is it sensitive to the antibiotics we prescribed?   --follow UTI prevention tips (see attached)  --control bowel movements/fecal cross-contamination  --treat vaginal atrophy (dryness)  --empty bladder before and after intercourse  --consider need for further evaluation (pelvic imaging and cystoscopy) if  issue persists; 618 CT A/P  normal    6)  Constipation intermittent with loose stool:  --continue daily fiber supplements  --May also use over the counter stool softener 1-2 x/day.  --continue use miralax daily--see if taking 1/2 dose is sufficient    7)  Elevated creatinine:  --seems acute and related to DM control/dehydrated; repeat 7/2018 1.1  --hydrate well  --control diabetes  --follow up with Dr. Cox    8)  Stage 2 cystocele/rectocele:  --continue pessary use for now  --consider surgery (vaginal A&P)--recheck apex before surgery, as was ok; h/o mult abd surgeries    9)  RTC 3 months.

## 2018-08-03 LAB
BACTERIA UR CULT: NORMAL
BACTERIA UR CULT: NORMAL

## 2018-08-06 ENCOUNTER — LAB VISIT (OUTPATIENT)
Dept: LAB | Facility: HOSPITAL | Age: 73
End: 2018-08-06
Attending: INTERNAL MEDICINE
Payer: MEDICARE

## 2018-08-06 DIAGNOSIS — R94.6 ABNORMAL THYROID FUNCTION TEST: ICD-10-CM

## 2018-08-06 DIAGNOSIS — E11.65 UNCONTROLLED TYPE 2 DIABETES MELLITUS WITH HYPERGLYCEMIA, WITHOUT LONG-TERM CURRENT USE OF INSULIN: ICD-10-CM

## 2018-08-06 LAB
ANION GAP SERPL CALC-SCNC: 13 MMOL/L
BUN SERPL-MCNC: 16 MG/DL
CALCIUM SERPL-MCNC: 9.3 MG/DL
CHLORIDE SERPL-SCNC: 101 MMOL/L
CO2 SERPL-SCNC: 19 MMOL/L
CREAT SERPL-MCNC: 1.1 MG/DL
EST. GFR  (AFRICAN AMERICAN): 56.8 ML/MIN/1.73 M^2
EST. GFR  (NON AFRICAN AMERICAN): 49.2 ML/MIN/1.73 M^2
GLUCOSE SERPL-MCNC: 224 MG/DL
POTASSIUM SERPL-SCNC: 4.7 MMOL/L
SODIUM SERPL-SCNC: 133 MMOL/L
TSH SERPL DL<=0.005 MIU/L-ACNC: 0.51 UIU/ML

## 2018-08-06 PROCEDURE — 36415 COLL VENOUS BLD VENIPUNCTURE: CPT | Mod: PO

## 2018-08-06 PROCEDURE — 80048 BASIC METABOLIC PNL TOTAL CA: CPT

## 2018-08-06 PROCEDURE — 84443 ASSAY THYROID STIM HORMONE: CPT

## 2018-08-07 DIAGNOSIS — E11.65 UNCONTROLLED TYPE 2 DIABETES MELLITUS WITH HYPERGLYCEMIA, WITHOUT LONG-TERM CURRENT USE OF INSULIN: ICD-10-CM

## 2018-08-07 DIAGNOSIS — R73.9 HYPERGLYCEMIA: Primary | ICD-10-CM

## 2018-08-07 RX ORDER — INSULIN GLARGINE 100 [IU]/ML
10 INJECTION, SOLUTION SUBCUTANEOUS NIGHTLY
Qty: 15 ML | Refills: 3 | Status: SHIPPED | OUTPATIENT
Start: 2018-08-07 | End: 2019-02-20

## 2018-08-07 NOTE — TELEPHONE ENCOUNTER
----- Message from India Melgar sent at 8/7/2018  3:39 PM CDT -----  Contact: Self [phone]  PT picked up a prescription for insulin today. She did not received any needles. Requesting a prescription for needles.  ..  [id number] - [address] [address]  Phone: [phone] Fax: [phone]

## 2018-08-08 ENCOUNTER — TELEPHONE (OUTPATIENT)
Dept: UROGYNECOLOGY | Facility: CLINIC | Age: 73
End: 2018-08-08

## 2018-08-08 RX ORDER — SYRINGE AND NEEDLE,INSULIN,1ML 31GX15/64"
1 SYRINGE, EMPTY DISPOSABLE MISCELLANEOUS DAILY
Qty: 30 SYRINGE | Refills: 3 | COMMUNITY
Start: 2018-08-08 | End: 2019-02-28

## 2018-08-08 NOTE — TELEPHONE ENCOUNTER
----- Message from Amanda Blakely sent at 8/8/2018  9:10 AM CDT -----  Contact: Lyudmila From Dr. Harley Wallis office   Lyudmila is requesting clearance to move forward with dental treatment that the pt is needing. Fax: [phone]  Office: [phone].

## 2018-08-08 NOTE — TELEPHONE ENCOUNTER
Spoke to Danelle in Dr. Sanchez's office and informed her, Dr. Kuo is the Pt's Uro/gyn MD and not her PCP. She voiced understanding and call ended.

## 2018-08-08 NOTE — TELEPHONE ENCOUNTER
----- Message from India Melgar sent at 8/8/2018  9:04 AM CDT -----  Contact: Dr. Harley Mills [phone]  Dr. Mills is requesting a medical clearance for dental work. The clearance can be faxed to     [phone] (Fax) / Lyudmila

## 2018-08-13 ENCOUNTER — TELEPHONE (OUTPATIENT)
Dept: ENDOCRINOLOGY | Facility: CLINIC | Age: 73
End: 2018-08-13

## 2018-08-13 ENCOUNTER — TELEPHONE (OUTPATIENT)
Dept: HEMATOLOGY/ONCOLOGY | Facility: CLINIC | Age: 73
End: 2018-08-13

## 2018-08-13 NOTE — LETTER
August 13, 2018    Anayeli Dye Dutch  [address]  [place] [address]             [facility]  [address] [facility] [address]  Phone: [phone] To Whom It May Concern:      From an oncological standpoint, Anayeli Judd is cleared for all dental procedures.      Sincerely,          Natalie Burleson MD

## 2018-08-13 NOTE — TELEPHONE ENCOUNTER
Spoke with nurse Coreas with  dental office whom is requesting a clearance letter from  on patient to have an extensive procedure of mouth reconstruction giving all the history from patient to  he is asking for all providers of her care for a clearance letter informed Ms Lyudmila i will be routing to provider

## 2018-08-13 NOTE — TELEPHONE ENCOUNTER
----- Message from Abimbola Stephens sent at 8/13/2018  2:34 PM CDT -----  Contact: dayo calling from Dr jordyn oshea calling from Dr cobb called to speak with nurse about pt getting treatment at the Dental  appt   Callback#[phone] and Fax [phone] for Paper work to be sent   Thank You  GIO Stephens

## 2018-08-13 NOTE — TELEPHONE ENCOUNTER
----- Message from Joslyn Thompson sent at 8/13/2018  2:24 PM CDT -----  Contact: dayo with Dr Arthur [phone]     Dayo  would like to be called back regarding medical clearance to  proceed with medical treatment.     can fax medical release to [phone] jonh Coreas

## 2018-08-13 NOTE — TELEPHONE ENCOUNTER
Spoke with dayo at dental office.  Patient needs extensive major dental work.  DDS is requesting a medical clearance from all her MDs.      Message routed to dr lopez (are you OK with me writing an oncological clearance letter for her)

## 2018-08-15 ENCOUNTER — TELEPHONE (OUTPATIENT)
Dept: ENDOCRINOLOGY | Facility: CLINIC | Age: 73
End: 2018-08-15

## 2018-08-15 RX ORDER — METFORMIN HYDROCHLORIDE 750 MG/1
750 TABLET, EXTENDED RELEASE ORAL 2 TIMES DAILY WITH MEALS
Qty: 60 TABLET | Refills: 11 | OUTPATIENT
Start: 2018-08-15 | End: 2018-08-15 | Stop reason: SDUPTHER

## 2018-08-15 RX ORDER — METFORMIN HYDROCHLORIDE 750 MG/1
750 TABLET, EXTENDED RELEASE ORAL 2 TIMES DAILY WITH MEALS
Qty: 60 TABLET | Refills: 11 | Status: SHIPPED | OUTPATIENT
Start: 2018-08-15 | End: 2019-07-30 | Stop reason: SDUPTHER

## 2018-08-15 NOTE — TELEPHONE ENCOUNTER
Fasting 122   One hour after breakfast: 243  Afternoon: 139  6 PM: 163    Results for orders placed or performed in visit on 08/06/18   Basic metabolic panel   Result Value Ref Range    Sodium 133 (L) 136 - 145 mmol/L    Potassium 4.7 3.5 - 5.1 mmol/L    Chloride 101 95 - 110 mmol/L    CO2 19 (L) 23 - 29 mmol/L    Glucose 224 (H) 70 - 110 mg/dL    BUN, Bld 16 8 - 23 mg/dL    Creatinine 1.1 0.5 - 1.4 mg/dL    Calcium 9.3 8.7 - 10.5 mg/dL    Anion Gap 13 8 - 16 mmol/L    eGFR if African American 56.8 (A) >60 mL/min/1.73 m^2    eGFR if non  49.2 (A) >60 mL/min/1.73 m^2   TSH   Result Value Ref Range    TSH 0.513 0.400 - 4.000 uIU/mL       PLAN:  lantus 12  Units at bedtime (from 10 units in the morning)  Metformin 750 mg one tablet twice a day - stay on this dose   tradjenta 5 mg daily   Check before meals   DM education

## 2018-08-17 ENCOUNTER — TELEPHONE (OUTPATIENT)
Dept: ENDOCRINOLOGY | Facility: CLINIC | Age: 73
End: 2018-08-17

## 2018-08-17 ENCOUNTER — LAB VISIT (OUTPATIENT)
Dept: LAB | Facility: HOSPITAL | Age: 73
End: 2018-08-17
Attending: INTERNAL MEDICINE
Payer: MEDICARE

## 2018-08-17 ENCOUNTER — CLINICAL SUPPORT (OUTPATIENT)
Dept: DIABETES | Facility: CLINIC | Age: 73
End: 2018-08-17
Payer: MEDICARE

## 2018-08-17 DIAGNOSIS — N39.0 URINARY TRACT INFECTION WITHOUT HEMATURIA, SITE UNSPECIFIED: ICD-10-CM

## 2018-08-17 DIAGNOSIS — E11.9 TYPE 2 DIABETES MELLITUS WITHOUT COMPLICATION, WITHOUT LONG-TERM CURRENT USE OF INSULIN: Chronic | ICD-10-CM

## 2018-08-17 DIAGNOSIS — E03.4 HYPOTHYROIDISM DUE TO ACQUIRED ATROPHY OF THYROID: ICD-10-CM

## 2018-08-17 DIAGNOSIS — Z79.899 MEDICATION MANAGEMENT: ICD-10-CM

## 2018-08-17 LAB
ESTIMATED AVG GLUCOSE: 169 MG/DL
HBA1C MFR BLD HPLC: 7.5 %

## 2018-08-17 PROCEDURE — G0108 DIAB MANAGE TRN  PER INDIV: HCPCS | Mod: PBBFAC | Performed by: DIETITIAN, REGISTERED

## 2018-08-17 PROCEDURE — 83036 HEMOGLOBIN GLYCOSYLATED A1C: CPT

## 2018-08-17 PROCEDURE — 36415 COLL VENOUS BLD VENIPUNCTURE: CPT | Mod: PO

## 2018-08-17 NOTE — PROGRESS NOTES
Diabetes Education  Author: Coral Peck RD  Date: 8/17/2018    Diabetes Education Visit  Diabetes Education Record Assessment/Progress: Initial    Diabetes Type : Type II (pt reports her diabetes was steroid induced - she does have hx of chronic steroid use)  Diabetes Diagnosis: 3-5 years      Nutrition  Meal Planning: 3 meals per day, snacks between meal  What type of sweetener do you use?: Stevia/Truvia  What type of beverages do you drink?: diet soda/tea  Meal Plan 24 Hour Recall - Breakfast: (eggs, toast; OR cereal; OR oatmeal)  Meal Plan 24 Hour Recall - Lunch: (salads)  Meal Plan 24 Hour Recall - Dinner: (salad and meats)  Meal Plan 24 Hour Recall - Snack: (bell pepper, rice cakes)    Monitoring   Self Monitoring : (no fingersticks 2/2 CGM use)  Blood Glucose Logs: Yes (7day time in target: 49%, 14day: 45% (target range: ); BG avg MN-6a: 109, 6a-12p: 143, 12p-6p: 131, 6p-MN: 121 (improved since starting basal insulin)  Do you use a personal glucose monitor?: Yes  What kind of glucose monitor do you use?: Freestyle Willian Flash  In the last month, how often have you had a low blood sugar reaction?: never      Current Diabetes Treatment   Current Treatment: Oral Medication, Insulin (tradjenta 5 mg daily; metformin 750 mg BID; Lantus 10 units nightly (started 1 week ago))    Preferred Learning Method: Face to Face, Reading Materials  Primary Support: Self, Daughter  Occupation: (retired)    Smoking Status: Never a Smoker  Alcohol Use: Weekly    DDS-2 Score  ( > 3 = SIGNIFICANT DISTRESS): 2.5    Barriers to Change: None  Learning Challenges : None  Readiness to Learn : Acceptance  Cultural Influences: No        Diabetes Education Assessment/Progress  Diabetes Disease Process (diabetes disease process and treatment options): Discussion, Instructed, Individual Session, Comprehends Key Points  Discussed disease process and pt's most likely causes of recently elevated A1c and BGs. Discussed current and  "continued treatment options including dietary and lifestyle changes as well as medication additions and/or changes.    Nutrition (Incorporating nutritional management into one's lifestyle): Discussion, Instructed, Individual Session, Written Materials Provided, Demonstrates Understanding/Competency (verbalizes/demonstrates)  Reviewed ADA diet recommendations. Discussed carb vs non-carb foods and appropriate amounts to have at meals and snacks. Pt feels very limited on foods that are "okay" to eat - her home dietitian has instructed her to cut out lots of foods that she loves - foods that are, per ADA standards, okay for her to have.     Medications (states correct name, dose, onset, peak, duration, side effects & timing of meds): Discussion, Demonstration, Instructed, Individual Session, Return Demonstration, Demonstrates Understanding/Competency(verbalizes/demonstrates), Written Materials Provided  Reviewed timing and MOA of long acting insulin. Pt started one week ago - reviewed injection technique, rotation of sites, and appropriate insulin storage.    Monitoring (monitoring blood glucose/other parameters & using results): Discussion, Instructed, Individual Session, Written Materials Provided, Comprehends Key Points  Encouraged pt to scan freestyle dev sensor before bed and immediately when she wakes up. She has little data on BG readings between 6pm and 3am because she has not been scanning frequently enough during this period. Reviewed BG goals before and after meals. Instructed on how to interpret sensor readings - especially with use of trend arrows.    Cognitive (knowledge of self-management skills, functional health literacy): Discussion  Arrives with good health management knowledge - has specific knowledge of diabetes management. Leaves with increased knowledge base - will benefit from f/u annually.    Diabetes Distress and Support Systems: Discussion  Minimal distress noted - eased after discussion of " nutrition and encouraged eating more foods she used to enjoy. Seems like most distress was stemming from pt's dietitian's (reports she has a dietitian that comes to her home) recommendations that severely limited diet option.    Behavioral (readiness for change, lifestyle practices, self-care behaviors): Discussion  Appears very motivated to make recommended changes.        Goals  Patient has selected/evaluated goals during today's session: Yes, selected    Monitoring: Set(Scan Freestyle sensor at least once every 8 hours to obtain continuous data)  Start Date: 08/17/18    Today's Diabetes Self-Management Plan was developed with the patient's input and was based on identified barriers from today's assessment.      Short-term goals written today along with the patient/caregiver and the patient has agreed to working towards the above goals to improve his overall diabetes control.      Provided pt with a copy of today's self-management plan and goals. Pt verbalized understanding of the care plan, goals, and all of today's instructions.      Encouraged pt to communicate with his/her physician and care team regarding his/her condition(s) and treatment.  Provided my contact information today and encouraged pt to call me for questions or message me through the patient portal as needed.          Diabetes Care Plan/Intervention  Education Plan/Intervention: Individual Follow-Up DSMT(annual)          Diabetes Meal Plan  Restrictions: Restricted Carbohydrate  Carbohydrate Per Meal: 30-45g  Carbohydrate Per Snack : 15-20g, 7-15g    Education Units of Time   Time Spent: 60 min    Health Maintenance was reviewed today with patient. Discussed with patient importance of routine eye exams, foot exams/foot care, blood work (i.e.: A1c, microalbumin, and lipid), dental visits, yearly flu vaccine, and pneumonia vaccine as indicated by PCP. Patient verbalized understanding.     Health Maintenance Topics with due status: Not Due        Topic Last Completion Date    TETANUS VACCINE 05/04/2015    Urine Microalbumin 12/06/2017    Eye Exam 02/12/2018    Lipid Panel 02/20/2018    Hemoglobin A1c 05/16/2018    Low Dose Statin 08/01/2018     Health Maintenance Due   Topic Date Due    Foot Exam  01/06/1955    Pneumococcal (65+) (2 of 2 - PCV13) 12/03/2011    Mammogram  03/29/2018    DEXA SCAN  05/07/2018    Influenza Vaccine  08/01/2018

## 2018-08-17 NOTE — TELEPHONE ENCOUNTER
Please call Ms. Dutch,     I would like to see her next A1C before writing her clearance letter for her dental procedure.   As her previous level done in May was a little high.     Lab Results   Component Value Date    HGBA1C 8.7 (H) 05/16/2018     SD

## 2018-08-17 NOTE — TELEPHONE ENCOUNTER
----- Message from India Melgar sent at 8/17/2018  3:24 PM CDT -----  Contact: Self [phone]  .Medication question / problems.  PT needs clarity on her metformin dosage. She has a prescription for 750 mg but thinks she was told it would be 500 mg.   Per Dr. Tucker, would recommend a repeat BMP 1-2 weeks after starting spironolactone. Order has been placed. Patient reports that he started spironolactone 1 week ago and will try to have done soon. Informed patient that any local  clinic can see order.  Ansley Nelson LPN  Nephrology  [facility]  [phone]

## 2018-08-17 NOTE — TELEPHONE ENCOUNTER
Spoke with patient. Patient was unsure is  wanted to change the metformin to 500 or keep it a 750 informed patient after reviewing  chart notes it states for her to stay on 750 mg patient verbalized under standing

## 2018-08-22 ENCOUNTER — TELEPHONE (OUTPATIENT)
Dept: ENDOCRINOLOGY | Facility: CLINIC | Age: 73
End: 2018-08-22

## 2018-08-22 NOTE — TELEPHONE ENCOUNTER
----- Message from Mindi Garcia sent at 8/22/2018 10:22 AM CDT -----  Contact: Self  .Needs Advice    Reason for call:  Problems getting labs work,  Says dentist does not want to release her, please call to discuss further.  Communication Preference: [phone]  Additional Information:

## 2018-08-22 NOTE — TELEPHONE ENCOUNTER
Ms Lyudmila called regarding pt. clearance letter to have dental work informed her that  did not approved due to lab work stated she would get back with me after patient next schedule lab

## 2018-08-22 NOTE — TELEPHONE ENCOUNTER
----- Message from Kiera Reynolds sent at 8/22/2018  9:22 AM CDT -----  Contact: Lyudmila    [phone]  Needs Advice / Dr Mills     Reason for call:    [facility] for Dental treatment on the pt   Communication Preference:  Additional Information:  Fax number [phone]

## 2018-08-22 NOTE — TELEPHONE ENCOUNTER
Patient call to ask about her labs that she had done on 8/16 to see if her can have clearance after speaking with

## 2018-08-27 ENCOUNTER — TELEPHONE (OUTPATIENT)
Dept: ENDOCRINOLOGY | Facility: CLINIC | Age: 73
End: 2018-08-27

## 2018-08-29 ENCOUNTER — TELEPHONE (OUTPATIENT)
Dept: ENDOCRINOLOGY | Facility: CLINIC | Age: 73
End: 2018-08-29

## 2018-08-29 ENCOUNTER — PATIENT MESSAGE (OUTPATIENT)
Dept: ENDOCRINOLOGY | Facility: CLINIC | Age: 73
End: 2018-08-29

## 2018-08-29 NOTE — TELEPHONE ENCOUNTER
----- Message from Joslyn Thompson sent at 8/29/2018 12:30 PM CDT -----  Contact: pt  Pt would like to be called back regarding care  Pt can be reached at [phone]

## 2018-08-29 NOTE — TELEPHONE ENCOUNTER
Spoke to patient informed her that Provider has been double booked an very busy in clinic,i will send her a message over to see if  can write clearance letter for patient to have dental work done since they dont do pre ot. Patient verbalized understanding

## 2018-10-31 ENCOUNTER — OFFICE VISIT (OUTPATIENT)
Dept: UROGYNECOLOGY | Facility: CLINIC | Age: 73
End: 2018-10-31
Payer: MEDICARE

## 2018-10-31 VITALS
BODY MASS INDEX: 27.06 KG/M2 | HEIGHT: 62 IN | SYSTOLIC BLOOD PRESSURE: 122 MMHG | DIASTOLIC BLOOD PRESSURE: 60 MMHG | WEIGHT: 147.06 LBS

## 2018-10-31 DIAGNOSIS — K59.00 CONSTIPATION, UNSPECIFIED CONSTIPATION TYPE: ICD-10-CM

## 2018-10-31 DIAGNOSIS — N39.0 RECURRENT UTI: Primary | ICD-10-CM

## 2018-10-31 DIAGNOSIS — N90.89 VULVAR IRRITATION: ICD-10-CM

## 2018-10-31 DIAGNOSIS — Z46.89 PESSARY MAINTENANCE: ICD-10-CM

## 2018-10-31 DIAGNOSIS — N39.46 MIXED STRESS AND URGE URINARY INCONTINENCE: ICD-10-CM

## 2018-10-31 DIAGNOSIS — N95.2 VAGINAL ATROPHY: ICD-10-CM

## 2018-10-31 PROCEDURE — 99213 OFFICE O/P EST LOW 20 MIN: CPT | Mod: PBBFAC | Performed by: NURSE PRACTITIONER

## 2018-10-31 PROCEDURE — 51701 INSERT BLADDER CATHETER: CPT | Mod: PBBFAC | Performed by: NURSE PRACTITIONER

## 2018-10-31 PROCEDURE — 51701 INSERT BLADDER CATHETER: CPT | Mod: S$PBB,,, | Performed by: NURSE PRACTITIONER

## 2018-10-31 PROCEDURE — 99214 OFFICE O/P EST MOD 30 MIN: CPT | Mod: 25,S$PBB,, | Performed by: NURSE PRACTITIONER

## 2018-10-31 PROCEDURE — 99999 PR PBB SHADOW E&M-EST. PATIENT-LVL III: CPT | Mod: PBBFAC,,, | Performed by: NURSE PRACTITIONER

## 2018-10-31 RX ORDER — BETAMETHASONE DIPROPIONATE 0.5 MG/G
OINTMENT TOPICAL
Qty: 45 G | Refills: 3 | Status: SHIPPED | OUTPATIENT
Start: 2018-10-31 | End: 2020-08-13

## 2018-10-31 NOTE — PROGRESS NOTES
"Urogyn follow up    .  [facility] [address] [place] [address]     Hardik Judd  [id number]  1945      Anayeli Judd is a 73 y.o.  here for a urogyn follow up for recurrent uti.    Last HPI from 2018  1)  UI:  (+) MAYKEL (sneeze, cough)  = (+) UUI (1st AM)  X years but worse after CRS.  (+) pads (liners or thick, depending on how in control she feels):10/day, usually moderate wetness and keeps pad by bed (does not wear one to "air out"), uses pad on way to BR--usually has to change clothes at night.   Daytime frequency: Q 2 hours.  Nocturia: Yes: 4/night.   (--) dysuria,  (--) hematuria,  (+) frequent UTIs since CRS surgery.  Currently on daily Keflex 250 mg daily--followed by [facility].   (+) complete bladder emptying.     2)  POP:  Absent. (--) vaginal bleeding. (--) vaginal discharge. (--) sexually active.  (--) dyspareunia-- has been .  (+)  Vaginal dryness--taking osphena, which was helping but thinks that she is dry due to excessive cleaning.  (--) vaginal estrogen use--tried in past but didn't help.      3)  BM: h/o colon CA s/p sigmoidectomy.  Had some postop anal leakage. Doesn't always feel when this happens.  This has increased UTI frequency.  Stays on BRAT diet to help stool consistency.  Takes fibercon 2 in AM and miralax every AM.   (--) constipation/straining.  (+) chronic diarrhea. (--) hematochezia.  (+) fecal incontinence, loose.  (--) fecal smearing/urgency.  (+) complete evacuation but unsure because has frequent BMs.   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 Ward with extensive TIFFANY (>2h) and multiple enterotomies.  S/p chemo.  No radTx. Has had recurrent SBOs.  Last was 18.  Usually gets better with bowel rest and contrast study.    --Patient states that since last year she has " "been dealing with intermittent abdominal pain due to her scar tissue for which she has been seeing Dr. Erazo    Changes from last visit:  1)  Mixed urinary incontinence, urge > stress:    -imrorved with pessary.   Is having some UUI on way to BR.  Going through same amount of pads during day but with min wetness.  Wearing pads at night instead of wetting clothes.    --baseline:   (+) pads (liners or thick, depending on how in control she feels):10/day, usually moderate wetness and keeps pad by bed (does not wear one to "air out"), uses pad on way to BR--usually has to change clothes at night.   Daytime frequency: Q 2 hours.  --controlling diabetes     2)  Vaginal atrophy (dryness):  Use dime-sized amount of estrogen cream with finger around vaginal opening/inner lips nightly.     --using osphena     3)  Vaginal irritation:  --still irritated due to estrogen cream.  Has been out of moisture barrier cream.      4)  Incomplete bladder emptying:  --can't tell if pessary helping; does have moderate 2nd void volumes by straining.     5)  Frequent UTIs (bladder infections):  --has not had recent symptoms  --control diabetes  --work on improving bladder emptying  --continues Keflex 250 mg daily     6)  Constipation intermittent with loose stool:  --better with 4-5 tsps/day  --not needing stool softener  --continue use miralax daily--see if taking 1/2 dose is sufficient     7)  Elevated creatinine:  --improving      Past Medical History:   Diagnosis Date    Allergic rhinitis     Arthritis     Blood transfusion     during delivery and     Bowel obstruction     Cervical radiculopathy     followed by dr cloud    Colon cancer     transverse colon; resected; Stage IIA (pT3 pN0 MX)    Diarrhea     Family history of breast cancer     Family history of colon cancer     Fatty liver     GERD (gastroesophageal reflux disease)     History of shingles     Hyperlipidemia     Hypothyroidism     Irritable " bowel syndrome     Microscopic colitis     treated     Raynaud phenomenon     Raynaud's disease     Type 2 diabetes mellitus        Past Surgical History:   Procedure Laterality Date    APPENDECTOMY      BACK SURGERY      CARPAL TUNNEL RELEASE      bilateral      SECTION      CHOLECYSTECTOMY  1965    COLECTOMY  2011    Transverse colon resection by Dr. Aguirre    COLONOSCOPY N/A 2017    Procedure: COLONOSCOPY;  Surgeon: Manjit Alvarez MD;  Location: [facility] (4TH FLR);  Service: Endoscopy;  Laterality: N/A;    COLONOSCOPY N/A 2017    Performed by [facility] at [facility] (4TH FLR)    COLONOSCOPY N/A 2013    Performed by Angelo Reynolds MD at [facility] END[facility] (4TH FLR)    EXPLORATORY-LAPAROTOMY N/A 2017    Performed by Herman Fletcher [facility] at [facility] OR 2ND FLR    EXPLORATORY-LAPAROTOMY N/A 2014    Performed by Gonzalez Silvestre [facility] at [facility] OR 2ND FLR    EYE SURGERY      Cataract Removal    HYSTERECTOMY      LYSIS-ADHESION N/A 2017    Performed by Herman [facility] at [facility] OR 2ND FLR    LYSIS-ADHESION N/A 2014    [facility]formed by [facility] at [facility] OR 2ND FLR    PLACEMENT  2017    Performed by Herman [facility] at [facility] OR 2ND FLR    posterolateral fusion with autograft bone and [place] mineralized bone matrix  13    at [facility] for lumbar spine stenosis    REPAIR  2017    [facility]formed by Herman [facility] at [facility] OR 2ND FLR    RESECTION - SMALL BOWEL N/A 2014    [facility]    TOE SURGERY      TONSILLECTOMY      TRIGGER FINGER RELEASE         Current Outpatient Medications   Medication Sig    atorvastatin (LIPITOR) 40 MG tablet take 1 tablet by mouth once daily    blood sugar diagnostic Strp Dispense madie contour strips; test blood sugar once daily    blood-glucose meter (CONTOUR METER) kit Please dispense Madie Contour meter and supplies Use as instructed Test  "Blood sugar once daily    conjugated estrogens (PREMARIN) vaginal cream Place 0.5 g vaginally every evening.    fluticasone (FLONASE) 50 mcg/actuation nasal spray 2 sprays by Each Nare route once daily.    FREESTYLE EFREN READER [facility] TEST as directed    FREESTYLE EFREN SENSOR Kit     gabapentin (NEURONTIN) 600 MG tablet Take 600 mg by mouth 2 (two) times daily.    insulin glargine (LANTUS SOLOSTAR U-100 INSULIN) 100 unit/mL (3 mL) InPn pen Inject 10 Units into the skin every evening.    insulin syringe-needle U-100 0.3 mL 31 gauge x 15/64" Syrg 1 Syringe by Misc.(Non-Drug; Combo Route) route once daily.    ipratropium (ATROVENT) 0.06 % nasal spray     lancets (ONE TOUCH ULTRASOFT LANCETS) Misc Test blood sugar once daily    levothyroxine (SYNTHROID) 100 MCG tablet TAKE 1 TABLET BY MOUTH EVERY MORNING    linagliptin (TRADJENTA) 5 mg Tab tablet Take 1 tablet (5 mg total) by mouth once daily.    magnesium 30 mg Tab Take 500 capsules by mouth. Patient's taking magnesium 500mg QD    meloxicam (MOBIC) 15 MG tablet TAKE 1 TABLET BY MOUTH DAILY WITH FOOD AS NEEDED FOR PAIN OR ARTHRITIS    metFORMIN (GLUCOPHAGE-XR) 750 MG 24 hr tablet Take 1 tablet (750 mg total) by mouth 2 (two) times daily with meals.    ospemifene (OSPHENA) 60 mg Tab Take 60 mg by mouth once daily.    pantoprazole (PROTONIX) 40 MG tablet TAKE 1 TABLET BY MOUTH ONCE DAILY    polyethylene glycol (GLYCOLAX) 17 gram/dose powder Take 17 g by mouth once daily.    VIT A/VIT C/VIT E/ZINC/COPPER (ICAPS AREDS ORAL) Take by mouth 2 (two) times daily.    vitamin D 1000 units Tab Take 185 mg by mouth once daily. D3    azelastine (ASTELIN) 137 mcg nasal spray 1 spray (137 mcg total) by Nasal route 2 (two) times daily.    betamethasone dipropionate (DIPROLENE) 0.05 % ointment Apply every Am x 2 weeks then twice weekly    diphenhydrAMINE (BENADRYL) 25 mg capsule Take 1 each (25 mg total) by mouth nightly as needed for Itching, Allergies or Insomnia. " "   hydrocortisone 2.5 % cream     metronidazole 0.75% (METROCREAM) 0.75 % Crea     mirabegron 50 mg Tb24 Take 1 tablet (50 mg total) by mouth once daily.    triamcinolone acetonide 0.1% (KENALOG) 0.1 % paste apply to affected area with A Q-TIP three times a day     No current facility-administered medications for this visit.        Well woman:  Pap test: post ANGEL.  History of abnormal paps: Yes--had ANGEL.  History of STIs:  No  Mammogram: Date of last: 2018.  Result: Normal per report ([facility])  Colonoscopy: Date of last: 2017.  Result:  Benign polyps.  Repeat due:  2019.    DEXA:  Date of last: 2015.  Result:  Normal.      ROS:  As per HPI.      Exam  /60 (BP Location: Right arm, Patient Position: Sitting, BP Method: Medium (Manual))   Ht 5' 2" (1.575 m)   Wt 66.7 kg (147 lb 0.8 oz)   LMP  (LMP Unknown)   BMI 26.90 kg/m²   General: alert and oriented, no acute distress  Respiratory: normal respiratory effort  Abd: soft, non-tender, non-distended    Pelvic  Ext. Genitalia:external genitalia slightly reddened and irritated. Atrophic. Normal bartholin's and skeens glands  Vagina: + atrophy. Normal vaginal mucosa without lesions. No discharge noted.   Small abrasion noted on R vaginal wall-- silver nitrate appliedNon-tender bladder base without palpable mass.  #2 ring with support pessary in place  Cervix: absent  Uterus:  absent   Urethra: no masses or tenderness  Urethral meatus: no lesions, caruncle or prolapse.    PVR 50 ml    Impression  1. Recurrent UTI  Urine culture   2. Mixed stress and urge urinary incontinence     3. Vaginal atrophy     4. Vulvar irritation     5. Constipation, unspecified constipation type     6. Pessary maintenance       We reviewed the above issues and discussed options for short-term versus long-term management of her problems.   Plan:     1)  Mixed urinary incontinence, urge > stress:    --urine C&S  --control diabetes  --Empty bladder every 3 hours.  Empty well: wait a " minute, lean forward on toilet.    --Avoid dietary irritants (see sheet).  Keep diary x 3-5 days to determine your irritants.  --consider PT in future   --URGE: consider medication (Mirabegron due to SBO).  Takes 2-4 weeks to see if will have effect.  For dry mouth: get sour, sugar free lozenge or gum.    --STRESS:  Pessary vs. Sling.   --continue #2 ring with support pessary     2)  Vaginal atrophy (dryness):  Use dime-sized amount of estrogen cream with finger around vaginal opening/inner lips nightly x 2 weeks then twice weekly.     --continue osphena  --may also reduce bladder infection rate     3)  Vaginal irritation:  --use betamethasone every Am x 2 weeks then twice weekly  --use estrogen cream every Pm x 2 weeks then twice weekly  --use estrogen cream in the vaginal nightly x 2 weeks then twice weekly  --use moisture barrier ointment    4)  Incomplete bladder emptying:  --improved with pessary     5)  Frequent UTIs (bladder infections):  --control diabetes  --work on improving bladder emptying  --continue Keflex 250 mg daily  --If you feel like you have a UTI, please call our office so that we can place an order for you to drop off a urine specimen at the closest [facility] lab (we will arrange).                --We will call in antibiotics for you to start right after you drop off specimen.                --In this way, we can determine:                           1)  Do you have a UTI?                            2) If you have a UTI, is it sensitive to the antibiotics we prescribed?   --follow UTI prevention tips (see attached)  --control bowel movements/fecal cross-contamination  --treat vaginal atrophy (dryness)  --empty bladder before and after intercourse  --consider need for further evaluation (pelvic imaging and cystoscopy) if issue persists; 618 CT A/P  normal     6)  Constipation intermittent with loose stool:  Controlling constipation may help bladder urgency/leakage and fiber may better control  cholesterol and blood glucose.  Increas daily fiber.  Increase fiber by 1 tablet every 3-5 days until stool is easy to pass. Take fiber tablets with large glass of water.  When find place where stool is easy to pass and more well-formed, less loose/accidents,  stop and continue at that dose.   Do not exceed 6 tablets/day.    --May also use over the counter stool softener 1-2 x/day.  --continue use miralax daily--see if taking 1/2 dose is sufficient     7)  Elevated creatinine:  --seems acute and related to DM control/dehydrated  --hydrate well  --control diabetes  --follow up with Dr. Cox     8)  RTC 3 months    30 minutes were spent in face to face time with this patient  90 % of this time was spent in counseling and/or coordination of care    KENNEDY Petit-BC [facility]  Division of Female Pelvic Medicine and Reconstructive Surgery  Department of Obstetrics & Gynecology

## 2018-10-31 NOTE — PATIENT INSTRUCTIONS
1)  Mixed urinary incontinence, urge > stress:    --urine C&S  --control diabetes  --Empty bladder every 3 hours.  Empty well: wait a minute, lean forward on toilet.    --Avoid dietary irritants (see sheet).  Keep diary x 3-5 days to determine your irritants.  --consider PT in future   --URGE: consider medication (Mirabegron due to SBO).  Takes 2-4 weeks to see if will have effect.  For dry mouth: get sour, sugar free lozenge or gum.    --STRESS:  Pessary vs. Sling.   --continue #2 ring with support pessary     2)  Vaginal atrophy (dryness):  Use dime-sized amount of estrogen cream with finger around vaginal opening/inner lips nightly x 2 weeks then twice weekly.     --continue osphena  --may also reduce bladder infection rate     3)  Vaginal irritation:  --use betamethasone every Am x 2 weeks then twice weekly  --use estrogen cream every Pm x 2 weeks then twice weekly  --use estrogen cream in the vaginal nightly x 2 weeks then twice weekly  --use moisture barrier ointment    4)  Incomplete bladder emptying:  --improved with pessary     5)  Frequent UTIs (bladder infections):  --control diabetes  --work on improving bladder emptying  --continue Keflex 250 mg daily  --If you feel like you have a UTI, please call our office so that we can place an order for you to drop off a urine specimen at the closest [facility] lab (we will arrange).                --We will call in antibiotics for you to start right after you drop off specimen.                --In this way, we can determine:                           1)  Do you have a UTI?                            2) If you have a UTI, is it sensitive to the antibiotics we prescribed?   --follow UTI prevention tips (see attached)  --control bowel movements/fecal cross-contamination  --treat vaginal atrophy (dryness)  --empty bladder before and after intercourse  --consider need for further evaluation (pelvic imaging and cystoscopy) if issue persists; 618 CT A/P  normal     6)   Constipation intermittent with loose stool:  Controlling constipation may help bladder urgency/leakage and fiber may better control cholesterol and blood glucose.  Increas daily fiber.  Increase fiber by 1 tablet every 3-5 days until stool is easy to pass. Take fiber tablets with large glass of water.  When find place where stool is easy to pass and more well-formed, less loose/accidents,  stop and continue at that dose.   Do not exceed 6 tablets/day.    --May also use over the counter stool softener 1-2 x/day.  --continue use miralax daily--see if taking 1/2 dose is sufficient     7)  Elevated creatinine:  --seems acute and related to DM control/dehydrated  --hydrate well  --control diabetes  --follow up with Dr. Cox     8)  RTC 3 months

## 2018-11-12 ENCOUNTER — LAB VISIT (OUTPATIENT)
Dept: LAB | Facility: HOSPITAL | Age: 73
End: 2018-11-12
Attending: INTERNAL MEDICINE
Payer: MEDICARE

## 2018-11-12 DIAGNOSIS — E53.8 VITAMIN B12 DEFICIENCY: ICD-10-CM

## 2018-11-12 DIAGNOSIS — R19.7 DIARRHEA, UNSPECIFIED TYPE: ICD-10-CM

## 2018-11-12 DIAGNOSIS — E03.4 HYPOTHYROIDISM DUE TO ACQUIRED ATROPHY OF THYROID: ICD-10-CM

## 2018-11-12 DIAGNOSIS — E11.9 TYPE 2 DIABETES MELLITUS WITHOUT COMPLICATION, WITHOUT LONG-TERM CURRENT USE OF INSULIN: Chronic | ICD-10-CM

## 2018-11-12 DIAGNOSIS — K58.9 IRRITABLE BOWEL SYNDROME WITHOUT DIARRHEA: ICD-10-CM

## 2018-11-12 LAB
ALBUMIN SERPL BCP-MCNC: 3.7 G/DL
ALP SERPL-CCNC: 51 U/L
ALT SERPL W/O P-5'-P-CCNC: 24 U/L
ANION GAP SERPL CALC-SCNC: 10 MMOL/L
AST SERPL-CCNC: 27 U/L
BASOPHILS # BLD AUTO: 0.07 K/UL
BASOPHILS NFR BLD: 0.9 %
BILIRUB SERPL-MCNC: 0.7 MG/DL
BUN SERPL-MCNC: 13 MG/DL
CALCIUM SERPL-MCNC: 9.5 MG/DL
CHLORIDE SERPL-SCNC: 107 MMOL/L
CHOLEST SERPL-MCNC: 150 MG/DL
CHOLEST/HDLC SERPL: 2.3 {RATIO}
CO2 SERPL-SCNC: 25 MMOL/L
CREAT SERPL-MCNC: 0.8 MG/DL
DIFFERENTIAL METHOD: ABNORMAL
EOSINOPHIL # BLD AUTO: 0.3 K/UL
EOSINOPHIL NFR BLD: 4 %
ERYTHROCYTE [DISTWIDTH] IN BLOOD BY AUTOMATED COUNT: 16.1 %
EST. GFR  (AFRICAN AMERICAN): >60 ML/MIN/1.73 M^2
EST. GFR  (NON AFRICAN AMERICAN): >60 ML/MIN/1.73 M^2
ESTIMATED AVG GLUCOSE: 123 MG/DL
GLUCOSE SERPL-MCNC: 135 MG/DL
HBA1C MFR BLD HPLC: 5.9 %
HCT VFR BLD AUTO: 37.5 %
HDLC SERPL-MCNC: 64 MG/DL
HDLC SERPL: 42.7 %
HGB BLD-MCNC: 11.1 G/DL
IGA SERPL-MCNC: 199 MG/DL
IGG SERPL-MCNC: 1070 MG/DL
IGM SERPL-MCNC: 99 MG/DL
IMM GRANULOCYTES # BLD AUTO: 0.01 K/UL
IMM GRANULOCYTES NFR BLD AUTO: 0.1 %
LDLC SERPL CALC-MCNC: 69.4 MG/DL
LYMPHOCYTES # BLD AUTO: 1.7 K/UL
LYMPHOCYTES NFR BLD: 23.2 %
MCH RBC QN AUTO: 25.8 PG
MCHC RBC AUTO-ENTMCNC: 29.6 G/DL
MCV RBC AUTO: 87 FL
MONOCYTES # BLD AUTO: 0.7 K/UL
MONOCYTES NFR BLD: 9.7 %
NEUTROPHILS # BLD AUTO: 4.6 K/UL
NEUTROPHILS NFR BLD: 62.1 %
NONHDLC SERPL-MCNC: 86 MG/DL
NRBC BLD-RTO: 0 /100 WBC
PLATELET # BLD AUTO: 145 K/UL
PMV BLD AUTO: 12.1 FL
POTASSIUM SERPL-SCNC: 4 MMOL/L
PROT SERPL-MCNC: 7.4 G/DL
RBC # BLD AUTO: 4.3 M/UL
SODIUM SERPL-SCNC: 142 MMOL/L
T4 FREE SERPL-MCNC: 1.23 NG/DL
TRIGL SERPL-MCNC: 83 MG/DL
TSH SERPL DL<=0.005 MIU/L-ACNC: 0.83 UIU/ML
VIT B12 SERPL-MCNC: 710 PG/ML
WBC # BLD AUTO: 7.42 K/UL

## 2018-11-12 PROCEDURE — 84443 ASSAY THYROID STIM HORMONE: CPT

## 2018-11-12 PROCEDURE — 83516 IMMUNOASSAY NONANTIBODY: CPT

## 2018-11-12 PROCEDURE — 80061 LIPID PANEL: CPT

## 2018-11-12 PROCEDURE — 82607 VITAMIN B-12: CPT

## 2018-11-12 PROCEDURE — 80053 COMPREHEN METABOLIC PANEL: CPT

## 2018-11-12 PROCEDURE — 85025 COMPLETE CBC W/AUTO DIFF WBC: CPT

## 2018-11-12 PROCEDURE — 84439 ASSAY OF FREE THYROXINE: CPT

## 2018-11-12 PROCEDURE — 83036 HEMOGLOBIN GLYCOSYLATED A1C: CPT

## 2018-11-12 PROCEDURE — 36415 COLL VENOUS BLD VENIPUNCTURE: CPT | Mod: PO

## 2018-11-12 PROCEDURE — 82784 ASSAY IGA/IGD/IGG/IGM EACH: CPT | Mod: 59

## 2018-11-14 LAB — TTG IGA SER-ACNC: 3 UNITS

## 2018-11-15 ENCOUNTER — OFFICE VISIT (OUTPATIENT)
Dept: GASTROENTEROLOGY | Facility: CLINIC | Age: 73
DRG: 392 | End: 2018-11-15
Payer: MEDICARE

## 2018-11-15 ENCOUNTER — TELEPHONE (OUTPATIENT)
Dept: GASTROENTEROLOGY | Facility: CLINIC | Age: 73
End: 2018-11-15

## 2018-11-15 ENCOUNTER — HOSPITAL ENCOUNTER (INPATIENT)
Facility: HOSPITAL | Age: 73
LOS: 3 days | Discharge: HOME | DRG: 392 | End: 2018-11-19
Attending: EMERGENCY MEDICINE | Admitting: INTERNAL MEDICINE
Payer: MEDICARE

## 2018-11-15 VITALS
BODY MASS INDEX: 26.53 KG/M2 | SYSTOLIC BLOOD PRESSURE: 115 MMHG | WEIGHT: 145.06 LBS | HEART RATE: 75 BPM | DIASTOLIC BLOOD PRESSURE: 64 MMHG

## 2018-11-15 DIAGNOSIS — R10.9 ABDOMINAL PAIN: Primary | ICD-10-CM

## 2018-11-15 DIAGNOSIS — K55.1 SUPERIOR MESENTERIC ARTERY STENOSIS: ICD-10-CM

## 2018-11-15 DIAGNOSIS — N39.46 MIXED STRESS AND URGE URINARY INCONTINENCE: ICD-10-CM

## 2018-11-15 DIAGNOSIS — R10.9 ABDOMINAL PAIN, UNSPECIFIED ABDOMINAL LOCATION: Primary | ICD-10-CM

## 2018-11-15 DIAGNOSIS — R06.02 SHORTNESS OF BREATH: ICD-10-CM

## 2018-11-15 DIAGNOSIS — N18.30 CHRONIC KIDNEY DISEASE, STAGE III (MODERATE): ICD-10-CM

## 2018-11-15 DIAGNOSIS — N39.0 FREQUENT UTI: ICD-10-CM

## 2018-11-15 DIAGNOSIS — N30.00 ACUTE CYSTITIS WITHOUT HEMATURIA: ICD-10-CM

## 2018-11-15 DIAGNOSIS — R11.2 NAUSEA AND VOMITING, INTRACTABILITY OF VOMITING NOT SPECIFIED, UNSPECIFIED VOMITING TYPE: ICD-10-CM

## 2018-11-15 PROBLEM — E83.42 HYPOMAGNESEMIA: Status: ACTIVE | Noted: 2018-11-15

## 2018-11-15 PROBLEM — N13.30 HYDRONEPHROSIS: Status: ACTIVE | Noted: 2018-11-15

## 2018-11-15 PROBLEM — D64.9 ANEMIA: Status: ACTIVE | Noted: 2018-11-15

## 2018-11-15 PROBLEM — E87.1 HYPONATREMIA: Status: ACTIVE | Noted: 2018-11-15

## 2018-11-15 LAB
ALBUMIN SERPL BCP-MCNC: 3.8 G/DL
ALP SERPL-CCNC: 51 U/L
ALT SERPL W/O P-5'-P-CCNC: 25 U/L
ANION GAP SERPL CALC-SCNC: 11 MMOL/L
AST SERPL-CCNC: 33 U/L
BACTERIA #/AREA URNS AUTO: ABNORMAL /HPF
BASOPHILS # BLD AUTO: 0.04 K/UL
BASOPHILS NFR BLD: 0.6 %
BILIRUB SERPL-MCNC: 0.7 MG/DL
BILIRUB UR QL STRIP: NEGATIVE
BNP SERPL-MCNC: 113 PG/ML
BUN SERPL-MCNC: 11 MG/DL
CALCIUM SERPL-MCNC: 9.4 MG/DL
CHLORIDE SERPL-SCNC: 102 MMOL/L
CLARITY UR REFRACT.AUTO: ABNORMAL
CO2 SERPL-SCNC: 22 MMOL/L
COLOR UR AUTO: YELLOW
CREAT SERPL-MCNC: 1 MG/DL
DIFFERENTIAL METHOD: ABNORMAL
EOSINOPHIL # BLD AUTO: 0.3 K/UL
EOSINOPHIL NFR BLD: 4.1 %
ERYTHROCYTE [DISTWIDTH] IN BLOOD BY AUTOMATED COUNT: 15.9 %
EST. GFR  (AFRICAN AMERICAN): >60 ML/MIN/1.73 M^2
EST. GFR  (NON AFRICAN AMERICAN): 55.2 ML/MIN/1.73 M^2
GLUCOSE SERPL-MCNC: 77 MG/DL
GLUCOSE UR QL STRIP: NEGATIVE
HCT VFR BLD AUTO: 33.3 %
HGB BLD-MCNC: 10.6 G/DL
HGB UR QL STRIP: NEGATIVE
HYALINE CASTS UR QL AUTO: 1 /LPF
IMM GRANULOCYTES # BLD AUTO: 0.01 K/UL
IMM GRANULOCYTES NFR BLD AUTO: 0.2 %
KETONES UR QL STRIP: NEGATIVE
LACTATE SERPL-SCNC: 1.4 MMOL/L
LEUKOCYTE ESTERASE UR QL STRIP: ABNORMAL
LIPASE SERPL-CCNC: 37 U/L
LYMPHOCYTES # BLD AUTO: 2 K/UL
LYMPHOCYTES NFR BLD: 30 %
MAGNESIUM SERPL-MCNC: 1.4 MG/DL
MCH RBC QN AUTO: 26.3 PG
MCHC RBC AUTO-ENTMCNC: 31.8 G/DL
MCV RBC AUTO: 83 FL
MICROSCOPIC COMMENT: ABNORMAL
MONOCYTES # BLD AUTO: 0.6 K/UL
MONOCYTES NFR BLD: 9.2 %
NEUTROPHILS # BLD AUTO: 3.7 K/UL
NEUTROPHILS NFR BLD: 55.9 %
NITRITE UR QL STRIP: NEGATIVE
NRBC BLD-RTO: 0 /100 WBC
PH UR STRIP: 5 [PH] (ref 5–8)
PHOSPHATE SERPL-MCNC: 3.3 MG/DL
PLATELET # BLD AUTO: 119 K/UL
PMV BLD AUTO: 10.9 FL
POTASSIUM SERPL-SCNC: 3.9 MMOL/L
PROT SERPL-MCNC: 7.3 G/DL
PROT UR QL STRIP: NEGATIVE
RBC # BLD AUTO: 4.03 M/UL
RBC #/AREA URNS AUTO: 1 /HPF (ref 0–4)
SODIUM SERPL-SCNC: 135 MMOL/L
SP GR UR STRIP: 1.01 (ref 1–1.03)
SQUAMOUS #/AREA URNS AUTO: 0 /HPF
TROPONIN I SERPL DL<=0.01 NG/ML-MCNC: <0.006 NG/ML
URN SPEC COLLECT METH UR: ABNORMAL
WBC # BLD AUTO: 6.63 K/UL
WBC #/AREA URNS AUTO: 70 /HPF (ref 0–5)

## 2018-11-15 PROCEDURE — 99284 EMERGENCY DEPT VISIT MOD MDM: CPT | Mod: ,,, | Performed by: EMERGENCY MEDICINE

## 2018-11-15 PROCEDURE — 25000003 PHARM REV CODE 250: Performed by: PHYSICIAN ASSISTANT

## 2018-11-15 PROCEDURE — 63600175 PHARM REV CODE 636 W HCPCS: Performed by: PHYSICIAN ASSISTANT

## 2018-11-15 PROCEDURE — G0378 HOSPITAL OBSERVATION PER HR: HCPCS

## 2018-11-15 PROCEDURE — 87186 SC STD MICRODIL/AGAR DIL: CPT

## 2018-11-15 PROCEDURE — 83690 ASSAY OF LIPASE: CPT

## 2018-11-15 PROCEDURE — 81001 URINALYSIS AUTO W/SCOPE: CPT

## 2018-11-15 PROCEDURE — 87088 URINE BACTERIA CULTURE: CPT

## 2018-11-15 PROCEDURE — 83735 ASSAY OF MAGNESIUM: CPT

## 2018-11-15 PROCEDURE — 99213 OFFICE O/P EST LOW 20 MIN: CPT | Mod: S$PBB,GC,, | Performed by: INTERNAL MEDICINE

## 2018-11-15 PROCEDURE — 84100 ASSAY OF PHOSPHORUS: CPT

## 2018-11-15 PROCEDURE — 80053 COMPREHEN METABOLIC PANEL: CPT

## 2018-11-15 PROCEDURE — S0028 INJECTION, FAMOTIDINE, 20 MG: HCPCS | Performed by: PHYSICIAN ASSISTANT

## 2018-11-15 PROCEDURE — 87077 CULTURE AEROBIC IDENTIFY: CPT

## 2018-11-15 PROCEDURE — 83605 ASSAY OF LACTIC ACID: CPT

## 2018-11-15 PROCEDURE — 99214 OFFICE O/P EST MOD 30 MIN: CPT | Mod: PBBFAC,25 | Performed by: INTERNAL MEDICINE

## 2018-11-15 PROCEDURE — 87086 URINE CULTURE/COLONY COUNT: CPT

## 2018-11-15 PROCEDURE — 85025 COMPLETE CBC W/AUTO DIFF WBC: CPT

## 2018-11-15 PROCEDURE — 84484 ASSAY OF TROPONIN QUANT: CPT

## 2018-11-15 PROCEDURE — 99999 PR PBB SHADOW E&M-EST. PATIENT-LVL IV: CPT | Mod: PBBFAC,GC,, | Performed by: INTERNAL MEDICINE

## 2018-11-15 PROCEDURE — 83880 ASSAY OF NATRIURETIC PEPTIDE: CPT

## 2018-11-15 RX ORDER — GLUCAGON 1 MG
1 KIT INJECTION
Status: DISCONTINUED | OUTPATIENT
Start: 2018-11-15 | End: 2018-11-19 | Stop reason: HOSPADM

## 2018-11-15 RX ORDER — SODIUM CHLORIDE 9 MG/ML
INJECTION, SOLUTION INTRAVENOUS CONTINUOUS
Status: DISCONTINUED | OUTPATIENT
Start: 2018-11-16 | End: 2018-11-16

## 2018-11-15 RX ORDER — BISACODYL 10 MG
10 SUPPOSITORY, RECTAL RECTAL DAILY PRN
Status: DISCONTINUED | OUTPATIENT
Start: 2018-11-15 | End: 2018-11-19 | Stop reason: HOSPADM

## 2018-11-15 RX ORDER — INSULIN ASPART 100 [IU]/ML
0-5 INJECTION, SOLUTION INTRAVENOUS; SUBCUTANEOUS
Status: DISCONTINUED | OUTPATIENT
Start: 2018-11-15 | End: 2018-11-19 | Stop reason: HOSPADM

## 2018-11-15 RX ORDER — LEVOTHYROXINE SODIUM 100 UG/1
100 TABLET ORAL EVERY MORNING
Status: DISCONTINUED | OUTPATIENT
Start: 2018-11-16 | End: 2018-11-19 | Stop reason: HOSPADM

## 2018-11-15 RX ORDER — ACETAMINOPHEN 10 MG/ML
1000 INJECTION, SOLUTION INTRAVENOUS ONCE
Status: COMPLETED | OUTPATIENT
Start: 2018-11-15 | End: 2018-11-15

## 2018-11-15 RX ORDER — RAMELTEON 8 MG/1
8 TABLET ORAL NIGHTLY PRN
Status: DISCONTINUED | OUTPATIENT
Start: 2018-11-15 | End: 2018-11-19 | Stop reason: HOSPADM

## 2018-11-15 RX ORDER — IBUPROFEN 200 MG
24 TABLET ORAL
Status: DISCONTINUED | OUTPATIENT
Start: 2018-11-15 | End: 2018-11-19 | Stop reason: HOSPADM

## 2018-11-15 RX ORDER — IPRATROPIUM BROMIDE AND ALBUTEROL SULFATE 2.5; .5 MG/3ML; MG/3ML
3 SOLUTION RESPIRATORY (INHALATION) EVERY 4 HOURS PRN
Status: DISCONTINUED | OUTPATIENT
Start: 2018-11-15 | End: 2018-11-19 | Stop reason: HOSPADM

## 2018-11-15 RX ORDER — SODIUM CHLORIDE 0.9 % (FLUSH) 0.9 %
5 SYRINGE (ML) INJECTION
Status: DISCONTINUED | OUTPATIENT
Start: 2018-11-15 | End: 2018-11-19 | Stop reason: HOSPADM

## 2018-11-15 RX ORDER — ACETAMINOPHEN 650 MG/20.3ML
650 LIQUID ORAL EVERY 4 HOURS PRN
Status: DISCONTINUED | OUTPATIENT
Start: 2018-11-15 | End: 2018-11-19 | Stop reason: HOSPADM

## 2018-11-15 RX ORDER — FAMOTIDINE 10 MG/ML
20 INJECTION INTRAVENOUS
Status: COMPLETED | OUTPATIENT
Start: 2018-11-15 | End: 2018-11-15

## 2018-11-15 RX ORDER — SODIUM CHLORIDE 9 MG/ML
1000 INJECTION, SOLUTION INTRAVENOUS
Status: COMPLETED | OUTPATIENT
Start: 2018-11-15 | End: 2018-11-15

## 2018-11-15 RX ORDER — IBUPROFEN 200 MG
16 TABLET ORAL
Status: DISCONTINUED | OUTPATIENT
Start: 2018-11-15 | End: 2018-11-19 | Stop reason: HOSPADM

## 2018-11-15 RX ORDER — GABAPENTIN 400 MG/1
1200 CAPSULE ORAL 2 TIMES DAILY
Status: DISCONTINUED | OUTPATIENT
Start: 2018-11-15 | End: 2018-11-19 | Stop reason: HOSPADM

## 2018-11-15 RX ORDER — ATORVASTATIN CALCIUM 20 MG/1
40 TABLET, FILM COATED ORAL DAILY
Status: DISCONTINUED | OUTPATIENT
Start: 2018-11-16 | End: 2018-11-19 | Stop reason: HOSPADM

## 2018-11-15 RX ORDER — MAGNESIUM SULFATE HEPTAHYDRATE 40 MG/ML
2 INJECTION, SOLUTION INTRAVENOUS
Status: COMPLETED | OUTPATIENT
Start: 2018-11-15 | End: 2018-11-15

## 2018-11-15 RX ORDER — CEPHALEXIN 500 MG/1
500 CAPSULE ORAL
Status: COMPLETED | OUTPATIENT
Start: 2018-11-15 | End: 2018-11-15

## 2018-11-15 RX ORDER — CEFTRIAXONE 1 G/1
1 INJECTION, POWDER, FOR SOLUTION INTRAMUSCULAR; INTRAVENOUS
Status: DISCONTINUED | OUTPATIENT
Start: 2018-11-16 | End: 2018-11-18

## 2018-11-15 RX ORDER — POLYETHYLENE GLYCOL 3350 17 G/17G
17 POWDER, FOR SOLUTION ORAL DAILY
Status: DISCONTINUED | OUTPATIENT
Start: 2018-11-16 | End: 2018-11-19 | Stop reason: HOSPADM

## 2018-11-15 RX ORDER — ONDANSETRON 8 MG/1
8 TABLET, ORALLY DISINTEGRATING ORAL EVERY 8 HOURS PRN
Status: DISCONTINUED | OUTPATIENT
Start: 2018-11-15 | End: 2018-11-19 | Stop reason: HOSPADM

## 2018-11-15 RX ORDER — PANTOPRAZOLE SODIUM 40 MG/1
40 TABLET, DELAYED RELEASE ORAL DAILY
Status: DISCONTINUED | OUTPATIENT
Start: 2018-11-16 | End: 2018-11-15

## 2018-11-15 RX ORDER — PANTOPRAZOLE SODIUM 40 MG/1
40 TABLET, DELAYED RELEASE ORAL 2 TIMES DAILY
Status: DISCONTINUED | OUTPATIENT
Start: 2018-11-15 | End: 2018-11-19 | Stop reason: HOSPADM

## 2018-11-15 RX ADMIN — MAGNESIUM SULFATE IN WATER 2 G: 40 INJECTION, SOLUTION INTRAVENOUS at 07:11

## 2018-11-15 RX ADMIN — RIFAXIMIN 200 MG: 200 TABLET ORAL at 11:11

## 2018-11-15 RX ADMIN — GABAPENTIN 1200 MG: 400 CAPSULE ORAL at 08:11

## 2018-11-15 RX ADMIN — RAMELTEON 8 MG: 8 TABLET, FILM COATED ORAL at 11:11

## 2018-11-15 RX ADMIN — ACETAMINOPHEN 1000 MG: 10 INJECTION, SOLUTION INTRAVENOUS at 11:11

## 2018-11-15 RX ADMIN — PANTOPRAZOLE SODIUM 40 MG: 40 TABLET, DELAYED RELEASE ORAL at 08:11

## 2018-11-15 RX ADMIN — SODIUM CHLORIDE: 0.9 INJECTION, SOLUTION INTRAVENOUS at 11:11

## 2018-11-15 RX ADMIN — SODIUM CHLORIDE 1000 ML: 0.9 INJECTION, SOLUTION INTRAVENOUS at 07:11

## 2018-11-15 RX ADMIN — FAMOTIDINE 20 MG: 10 INJECTION, SOLUTION INTRAVENOUS at 05:11

## 2018-11-15 RX ADMIN — CEPHALEXIN 500 MG: 500 CAPSULE ORAL at 07:11

## 2018-11-15 NOTE — PROGRESS NOTES
[facility]    Reason for visit: There were no encounter diagnoses.  Referring Provider/PCP: Rocio Mc MD    History of Present Illness:  Anayeli Judd is a 73 y.o. female with a history of CRC, multiple admissions for SBO, IBS, microscopic colitis, HLD, T2DM, GERD, Hypothyroidism, who is presenting for post-hospitalization follow-up of abdominal pain.    She follows in GI clinic with Dr. Alvarez, last seen 8/28/17 for evaluation of diarrhea. She had had recently undergone (7/2017) a colonoscopy given history of microscopic colitis (lymphocytic) which was negative, and was felt that her symptoms could be IBS, post-ccy diarrhea, vs SIBO related. She was recommended for trial of cholestyramine, probiotic and consideration of trial of rifaximin.    She presents to the GI clinic today for follow-up after being seen in the ED on 11/14. Per review of ED note, she presented with cramping abdominal pain x 2 days. She endorsed constipation initially which had transitioned to diarrhea s/p laxative therapy. Ed workup including a negative lab workup with exception of lactate 2.5. CT AP was obtained due to concern for recurrent SBO which was negative for pathology with exception of mild hydronephrosis.     Today she reports that she has acute worsening of her abdominal pain. She notes that her abdominal pain has been sam-umilical with radiation to the lower abdomen/suprapubic region. She notes the pain started ~1-2 weeks ago. She notes that this morning she was awoken at ~1:30AM due to severe abdominal pain which resolved after 10 minutes. Later after eating breakfast (30 min - 1 hr) she noted the development of recurrent abdominal pain. Pain currently 8/10. Pain is associated with nausea, no vomitus. No dysphagia or odynophagia or reflux. She denies identifying any single food group which worsens her symptoms and has not had relief despite modifying her diet.    Regarding bowel habits she  notes that recently she had been doing well with fiber supplementation, however over the past few weeks she has developed urgency to move bowels ~30 minutes after a meal. She notes occasional incontinence if unable to reach the bathroom. She notes that in the AM will have a formed BM but as day progresses she will have more loose bowel movements (non-bloody/non-melenic/non-mucus). Will have 3-4BM/day currently.   On review she endorses recently completing a 3ms course of antibiotics for urinary inection. Denies any urinary symptoms. Does not feel like she has a urinary infection (UA was negative in ED, culture pending).    PEndoHx:  Colonoscopy. (17) Provider: Dr. Alvarez. Indication: Diarrhea. Extent: Cecum. Preparation:Adequate.  - 5mm transverse polyp  - 2x 3mm transverse polyp [tubular adenoma]  - random colonic biopsies [negative for microscopic colitis]    Review of Systems:   Constitutional: no fever, chills or change in weight. Endorses good appetite.   Eyes: no visual changes   ENT: no sore throat or dysphagia  Respiratory: no cough or shortness of breath   Cardiovascular: no chest pain or palpitations   Gastrointestinal: as per HPI  Hematologic/Lymphatic: no easy bruising or lymphadenopathy   Musculoskeletal: no arthralgias or myalgias   Neurological: no change in mental status  Behavioral/Psych: no change in mood    Medical History:  Past Medical History:   Diagnosis Date    Allergic rhinitis     Arthritis     Blood transfusion     during delivery and     Bowel obstruction     Cervical radiculopathy     followed by dr cloud    Colon cancer     transverse colon; resected; Stage IIA (pT3 pN0 MX)    Diarrhea     Family history of breast cancer     Family history of colon cancer     Fatty liver     GERD (gastroesophageal reflux disease)     History of shingles     Hyperlipidemia     Hypothyroidism     Irritable bowel syndrome     Microscopic colitis     treated     Rayyane  phenomenon     Raynaud's disease     Type 2 diabetes mellitus        Past Surgical History:   Procedure Laterality Date    APPENDECTOMY      BACK SURGERY      CARPAL TUNNEL RELEASE      bilateral      SECTION      CHOLECYSTECTOMY  1965    COLECTOMY  2011    Transverse colon resection by Dr. Aguirre    COLONOSCOPY N/A 2017    Procedure: COLONOSCOPY;  Surgeon: Manjit Alvarez MD;  Location: [facility] ENDO (4TH FLR);  Service: Endoscopy;  Laterality: N/A;    COLONOSCOPY N/A 2017    Performed by Manjit Alvarez MD at [facility] (4TH FLR)    COLONOSCOPY N/A 2013    Performed by Angelo Reynolds MD at [facility] END[facility] (4TH FLR)    EXPLORATORY-LAPAROTOMY N/A 2017    Performed by Herman Fletcher MD at [facility] OR 2ND FLR    EXPLORATORY-LAPAROTOMY N/A 2014    Performed by Gonzalez Silvestre MD at [facility] 2ND FLR    EYE SURGERY      Cataract Removal    HYSTERECTOMY      LYSIS-ADHESION N/A 2017    Performed by Herman Fletcher MD at [facility] 2ND FLR    LYSIS-ADHESION N/A 2014    Performed by Gonzalez Silvestre MD at [facility] 2ND FLR    PLACEMENT  2017    Performed by Herman Fletcher MD at [facility] 2ND FLR    posterolateral fusion with autograft bone and Devon mineralized bone matrix  13    at [facility] for lumbar spine stenosis    REPAIR  2017    Performed by Herman Fletcher MD at [facility] 2ND FLR    RESECTION - SMALL BOWEL N/A 2014    Performed by Gonzalez [facility]vestre [facility] at [facility]    TOE SURGERY      TONSILLECTOMY      TRIGGER FINGER RELEASE         Family History   Problem Relation Age of Onset    Heart disease Father 50        Mi age 50    Colon cancer Father     Bladder Cancer Mother         non smoker    Cataracts Mother     Glaucoma Mother     Heart disease Mother     Hyperlipidemia Mother     Kidney disease Mother     Breast cancer Sister 79    Arthritis Daughter     Asthma Daughter     Depression Daughter     Hypertension  Daughter     Stroke Daughter 40    Arthritis Brother     Colon cancer Brother 70    Alcohol abuse Brother     Parkinsonism Brother     Alcohol abuse Brother     Depression Daughter     Celiac disease Neg Hx     Cirrhosis Neg Hx     Colon polyps Neg Hx     Crohn's disease Neg Hx     Cystic fibrosis Neg Hx     Esophageal cancer Neg Hx     Hemochromatosis Neg Hx     Inflammatory bowel disease Neg Hx     Irritable bowel syndrome Neg Hx     Liver cancer Neg Hx     Liver disease Neg Hx     Rectal cancer Neg Hx     Stomach cancer Neg Hx     Ulcerative colitis Neg Hx     Eliazar's disease Neg Hx     Amblyopia Neg Hx     Blindness Neg Hx     Macular degeneration Neg Hx     Retinal detachment Neg Hx     Strabismus Neg Hx     Melanoma Neg Hx        Social History     Socioeconomic History    Marital status:      Spouse name: Not on file    Number of children: Not on file    Years of education: Not on file    Highest education level: Not on file   Social Needs    Financial resource strain: Not on file    Food insecurity - worry: Not on file    Food insecurity - inability: Not on file    Transportation needs - medical: Not on file    Transportation needs - non-medical: Not on file   Occupational History    Not on file   Tobacco Use    Smoking status: Never Smoker    Smokeless tobacco: Never Used   Substance and Sexual Activity    Alcohol use: Yes     Alcohol/week: 3.0 oz     Types: 5 Glasses of wine per week     Comment: socially    Drug use: No    Sexual activity: No     Birth control/protection: Abstinence   Other Topics Concern    Are you pregnant or think you may be? Not Asked    Breast-feeding Not Asked   Social History Narrative    , lives alone.  Primary support are her children and friends.       Current Facility-Administered Medications on File Prior to Visit   Medication Dose Route Frequency Provider Last Rate Last Dose    [DISCONTINUED] ondansetron injection 4  "mg  4 mg Intravenous ED 1 Time Faina Cabrera PA-C        [DISCONTINUED] sodium chloride 0.9% bolus 1,000 mL  1,000 mL Intravenous ED 1 Time Faina Cabrera PA-C         Current Outpatient Medications on File Prior to Visit   Medication Sig Dispense Refill    atorvastatin (LIPITOR) 40 MG tablet Take 1 tablet (40 mg total) by mouth once daily. 90 tablet 1    azelastine (ASTELIN) 137 mcg nasal spray 1 spray (137 mcg total) by Nasal route 2 (two) times daily. 30 mL 1    betamethasone dipropionate (DIPROLENE) 0.05 % ointment Apply every Am x 2 weeks then twice weekly 45 g 3    blood sugar diagnostic Strp Dispense RatePoint contour strips; test blood sugar once daily 100 strip 11    blood-glucose meter (CONTOUR METER) kit Please dispense Hang w/ Contour meter and supplies Use as instructed Test Blood sugar once daily 1 each 0    conjugated estrogens (PREMARIN) vaginal cream Place 0.5 g vaginally every evening. 30 g 11    diphenhydrAMINE (BENADRYL) 25 mg capsule Take 1 each (25 mg total) by mouth nightly as needed for Itching, Allergies or Insomnia.  0    fluticasone (FLONASE) 50 mcg/actuation nasal spray 2 sprays by Each Nare route once daily.  0    FREESTYLE EFREN READER [facility] [place] TEST as directed  0    FREESTYLE EFREN SENSOR Kit   0    gabapentin (NEURONTIN) 600 MG tablet Take 1,200 mg by mouth 2 (two) times daily.       hydrocortisone 2.5 % cream   0    insulin glargine (LANTUS SOLOSTAR U-100 INSULIN) 100 unit/mL (3 mL) InPn pen Inject 10 Units into the skin every evening. (Patient taking differently: Inject 12 Units into the skin every evening. ) 15 mL 3    insulin syringe-needle U-100 0.3 mL 31 gauge x 15/64" Syrg 1 Syringe by Misc.(Non-Drug; Combo Route) route once daily. 30 Syringe 3    ipratropium (ATROVENT) 0.06 % nasal spray   0    lancets (ONE TOUCH ULTRASOFT LANCETS) Misc Test blood sugar once daily 200 each 11    levothyroxine (SYNTHROID) 100 MCG tablet TAKE 1 TABLET BY MOUTH EVERY MORNING 90 " tablet 0    linagliptin (TRADJENTA) 5 mg Tab tablet Take 1 tablet (5 mg total) by mouth once daily. 90 tablet 3    magnesium 30 mg Tab Take 500 capsules by mouth. Patient's taking magnesium 500mg QD      meloxicam (MOBIC) 15 MG tablet TAKE 1 TABLET BY MOUTH DAILY WITH FOOD AS NEEDED FOR PAIN OR ARTHRITIS 90 tablet 0    metFORMIN (GLUCOPHAGE-XR) 750 MG 24 hr tablet Take 1 tablet (750 mg total) by mouth 2 (two) times daily with meals. 60 tablet 11    metronidazole 0.75% (METROCREAM) 0.75 % Crea   0    mirabegron 50 mg Tb24 Take 1 tablet (50 mg total) by mouth once daily. 30 tablet 11    ondansetron (ZOFRAN) 4 MG tablet Take 1 tablet (4 mg total) by mouth every 8 (eight) hours as needed for Nausea. 12 tablet 0    ospemifene (OSPHENA) 60 mg Tab Take 60 mg by mouth once daily. 90 tablet 1    pantoprazole (PROTONIX) 40 MG tablet TAKE 1 TABLET BY MOUTH ONCE DAILY 90 tablet 1    polyethylene glycol (GLYCOLAX) 17 gram/dose powder Take 17 g by mouth once daily.  0    triamcinolone acetonide 0.1% (KENALOG) 0.1 % paste apply to affected area with A Q-TIP three times a day  0    VIT A/VIT C/VIT E/ZINC/COPPER (ICAPS AREDS ORAL) Take by mouth 2 (two) times daily.      vitamin D 1000 units Tab Take 185 mg by mouth once daily. D3      [DISCONTINUED] levothyroxine (SYNTHROID) 100 MCG tablet TAKE 1 TABLET BY MOUTH EVERY MORNING 90 tablet 0       Review of patient's allergies indicates:   Allergen Reactions    Percocet  [oxycodone-acetaminophen]      Other reaction(s): Itching    Sulfa (sulfonamide antibiotics)      Other reaction(s): Nausea  Other reaction(s): Itching    Adhesive Rash       Physical Exam:  General: Alert and Oriented x3, no distress. Well dressed and groomed. Has a packed bag with her. Appears comfortable and in no distressed but worried about not knowing the cause of her abdominal pain and going home.  Vitals:    11/15/18 1352   BP: 115/64   Pulse: 75     HEENT: Normocephalic, Atraumatic. No scleral  icterus.  Lymph: No cervical lymphadenopathy  Resp: Good air entry bilaterally, no adventitious sounds.  Cardiac: S1 and S2 normal.  Abdomen: Normoactive bowel sounds. Non-distended. Normal tympany. Soft. Tender to palpation mild diffuse tenderness to palpation.. No peritoneal signs. No visible hernia even with provocative maneuvers. Skin with multiple well healed incisions.  Geniturinary: no CVA tenderness  Extremities: No peripheral edema. Normal bilateral pedal and radial pulses.  Neurologic: No gross neurological Deficits  Psych: Calm, cooperative. Normal mood and affect. Appears anxious about going home.    Laboratory:  Lab Results   Component Value Date     (L) 11/15/2018    K 3.9 11/15/2018     11/15/2018    CO2 22 (L) 11/15/2018    BUN 11 11/15/2018    CREATININE 1.0 11/15/2018    CALCIUM 9.4 11/15/2018    ANIONGAP 11 11/15/2018    ESTGFRAFRICA >60.0 11/15/2018    EGFRNONAA 55.2 (A) 11/15/2018       Lab Results   Component Value Date    ALT 25 11/15/2018    AST 33 11/15/2018    ALKPHOS 51 (L) 11/15/2018    BILITOT 0.7 11/15/2018       Lab Results   Component Value Date    WBC 6.63 11/15/2018    HGB 10.6 (L) 11/15/2018    HCT 33.3 (L) 11/15/2018    MCV 83 11/15/2018     (L) 11/15/2018       Microbiology:  No Pertinent Microbiology    Imaging:  CT AP (11/14/18)  - post-surgical change s/p partial colectomy. No evidence of high grade bowel obstruction  - development of prominent collecting systems bilaterally suggesting some degree of hydronephrosis. Bladder is significantly distended.    Assessment:  Anayeli Judd is a 73 y.o. female who is presenting for post-hospitalization follow-up of abdominal pain.    Unclear the etiology of her acute worsening abdominal pain. She had an evaluation in the ED on 11/14 with a negative workup for acute pathology and negative lab evaluation with exception of slightly elevated lactate. Differentials to consider:  - mesenteric ischemia. Unlikely she has  an acute mesenteric ischemia given although she has significant pain she is sitting comfortable in examination room and able to ambulate without issue, examination remains benign, and CT - although not a CTA - was without any concerning features such as bowel wall edema or fat stranding. Additionally as she presented with initial worsening pain on 11/14 would expect she would be in extremis if an acute mesenteric ischemia had been present since 11/14. Chronic mesenteric ischemia can be considered given her vascular risk factors and that the pain is worsened with PO intake, though would expect more subacute-chronic presentation.  - PUD. Could explain worsening abdominal pain and relationship with PO intake (i.e. Gastric PUD). Though would not account for the radiation of her pain to suprapubic region. H&H remains at baseline and denies any gross evidence of bleeding. She has history of NSAID use which could predispose to PUD. Is on protonix currently.  - IBS. She has a history of IBS but need to exclude other etiologies before ascribing symptoms to her IBS.  - pancreatitis. Not likely given her negative CTAP and negative lipase on 11/14.  - SIBO. Given history of colon resection and multiple course of antibiotics. Can consider empiric trial of rifaximin.  - SBO. Though has a significant history of SBO, unlikely current presentation is secondary to SBO given +ve BM and +ve flatus and abdominal examination which is benign.  - cholelithiasis. She is s/p CCY and no evidence of biliary dilatation on CT on 11/14  - urinary etiology. Though she denies any dysuria, the CT scan is concerning for bladder distension and hydronephrosis. No CVA tenderness on examination and UA on 11/14 is not definitive for UTI. Symptoms could be secondary to acute urinary retention.      Plan:  1. Given her acute worsening of symptoms discussed with the patient and her PCP over the phone further management. We discussed that she does not feel  well/safe enough to return home and will plan for admission to the hospital. Given the concern for potential ischemic process that we would want excluded we recommend evaluation in the ED prior to admission.  2. Should have repeat labs including lactate and lipase  3. Defer to ED after re-evaluation and initial lab work whether patient should have further abdominal imaging such as CTA.  4. If no CTA should consider mesenteric duplex to exclude chronic mesenteric ischemia  5. Recommend consultation with urology for further evaluation of new hydronephrosis. Bladder scan should be obtained given CT concern for bladder distension and if significant could consider trial of straight cath to see if symptoms alleviated.  6. Pending urine culture.  7. Increase protonix to BID  8. CRC Screening: Last 7/2017 with 3x tubular adenoma, follow-up colonoscopy 2020.  Follow-up if symptoms worsen or fail to improve.    Guerrero Betts MD  Gastroenterology Fellow (PGY IV)  Phone: [phone]  Pager: [phone]    No orders of the defined types were placed in this encounter.

## 2018-11-15 NOTE — ED NOTES
Pt ambulated with minimal assistance to and from restroom. Placed back in bed, in position of comfort. Pt given blanket, pillow and socks per request.

## 2018-11-15 NOTE — ED PROVIDER NOTES
Encounter Date: 11/15/2018       History     Chief Complaint   Patient presents with    Abdominal Pain     Pt c/o abdominal pain.  She was seen in ED yesterday for same and f/u with GI today.  Sent to ED for possible ischemic bowel.      This is a 73 year old female with a PMH of DM, colon ca in remission, microscopic colitis who presents to the ED as a referral from GI clinic for abdominal pain. Patient with a 2 week history of intermittent abdominal cramping, initially epigastric. She was seen and evaluated in the ED yesterday with no acute findings on CT. She was tolerating PO intake with good pain control at the time of discharge. She reports waking from sleep around 0100 this morning with severe sharp epigastric and periumbilical pain. The pain was 9/10 in severity, is currently rated 6/10. Patient also reporting subjective fever, nausea, anorexia, diarrhea, decreased urination, fatigue, VIVAR. She had one episode of nonradiating right sided chest pain lasting approximately 10 min yesterday. The pain was not exertional in nature. She denies dysuria, melena or hematochezia. Extensive surgical hx including hysterectomy, appendectomy, colon resection.           Review of patient's allergies indicates:   Allergen Reactions    Percocet  [oxycodone-acetaminophen]      Other reaction(s): Itching    Sulfa (sulfonamide antibiotics)      Other reaction(s): Nausea  Other reaction(s): Itching    Adhesive Rash     Past Medical History:   Diagnosis Date    Allergic rhinitis     Arthritis     Blood transfusion     during delivery and     Bowel obstruction     Cervical radiculopathy     followed by dr cloud    Colon cancer     transverse colon; resected; Stage IIA (pT3 pN0 MX)    Diarrhea     Family history of breast cancer     Family history of colon cancer     Fatty liver     GERD (gastroesophageal reflux disease)     History of shingles     Hyperlipidemia     Hypothyroidism     Irritable bowel  syndrome     Microscopic colitis     treated 2013    Raynaud phenomenon     Raynaud's disease     Type 2 diabetes mellitus      Past Surgical History:   Procedure Laterality Date    APPENDECTOMY      BACK SURGERY      CARPAL TUNNEL RELEASE      bilateral      SECTION      CHOLECYSTECTOMY  1965    COLECTOMY  2011    Transverse colon resection by Dr. Aguirre    COLONOSCOPY N/A 2017    Procedure: COLONOSCOPY;  Surgeon: Manjit Alvarez MD;  Location: [facility] (4TH FLR);  Service: Endoscopy;  Laterality: N/A;    COLONOSCOPY N/A 2017    Performed by Manjit Alvarez MD at [facility] (4TH FLR)    COLONOSCOPY N/A 2013    Performed by Angelo Reynolds MD at [facility] (4TH FLR)    EXPLORATORY-LAPAROTOMY N/A 2017    Performed by Herman Fletcher MD at [facility] OR 2ND FLR    EXPLORATORY-LAPAROTOMY N/A 2014    Performed by Gonzalez Silvestre MD at [facility] 2ND FLR    EYE SURGERY      Cataract Removal    HYSTERECTOMY      LYSIS-ADHESION N/A 2017    Performed by Herman Fletcher MD at [facility] 2[facility] FLR    LYSIS-ADHESION N/A 2014    Performed by Gonzalez Silvestre MD at [facility] 2ND FLR    PLACEMENT  2017    Performed by Herman Fletcher MD at [facility] FLR    posterolateral fusion with autograft bone and Eunice mineralized bone matrix  13    at [facility] for lumbar [facility] stenosis    REPAIR  2017    Performed by Herman Fletcher MD at [facility] 2[facility] FLR    RESECTION - SMALL BOWEL N/A 2014    Performed by Gonzalez Silvestre MD at [facility] 2[facility] FLR    TOE SURGERY      TONSILLECTOMY      TRIGGER FINGER RELEASE       Family History   Problem Relation Age of Onset    Heart disease Father 50        Mi age 50    Colon cancer Father     Bladder Cancer Mother         non smoker    Cataracts Mother     Glaucoma Mother     Heart disease Mother     Hyperlipidemia Mother     Kidney disease Mother     Breast cancer Sister 79    Arthritis Daughter      Asthma Daughter     Depression Daughter     Hypertension Daughter     Stroke Daughter 40    Arthritis Brother     Colon cancer Brother 70    Alcohol abuse Brother     Parkinsonism Brother     Alcohol abuse Brother     Depression Daughter     Celiac disease Neg Hx     Cirrhosis Neg Hx     Colon polyps Neg Hx     Crohn's disease Neg Hx     Cystic fibrosis Neg Hx     Esophageal cancer Neg Hx     Hemochromatosis Neg Hx     Inflammatory bowel disease Neg Hx     Irritable bowel syndrome Neg Hx     Liver cancer Neg Hx     Liver disease Neg Hx     Rectal cancer Neg Hx     Stomach cancer Neg Hx     Ulcerative colitis Neg Hx     Eliazar's disease Neg Hx     Amblyopia Neg Hx     Blindness Neg Hx     Macular degeneration Neg Hx     Retinal detachment Neg Hx     Strabismus Neg Hx     Melanoma Neg Hx      Social History     Tobacco Use    Smoking status: Never Smoker    Smokeless tobacco: Never Used   Substance Use Topics    Alcohol use: Yes     Alcohol/week: 3.0 oz     Types: 5 Glasses of wine per week     Comment: socially    Drug use: No     Review of Systems   Constitutional: Positive for chills, fatigue and fever.   HENT: Negative for sore throat.    Respiratory: Positive for shortness of breath.    Cardiovascular: Positive for chest pain.   Gastrointestinal: Positive for abdominal pain, diarrhea and nausea.   Genitourinary: Negative for dysuria.   Musculoskeletal: Negative for back pain.   Skin: Negative for rash.   Neurological: Negative for weakness.   Hematological: Does not bruise/bleed easily.       Physical Exam     Initial Vitals [11/15/18 1513]   BP Pulse Resp Temp SpO2   117/62 63 16 97.3 °F (36.3 °C) 100 %      MAP       --         Physical Exam    Constitutional: She appears well-developed and well-nourished.   HENT:   Head: Atraumatic.   Eyes: Conjunctivae and EOM are normal. Pupils are equal, round, and reactive to light.   Cardiovascular: Normal rate, regular rhythm and normal  heart sounds.   Pulmonary/Chest: Breath sounds normal. No respiratory distress. She has no wheezes. She has no rhonchi. She has no rales.   Abdominal: Soft. Bowel sounds are normal. She exhibits no distension. There is generalized tenderness. There is no rigidity, no rebound, no guarding, no CVA tenderness, no tenderness at McBurney's point and negative Plascencia's sign.   Multiple abdominal surgical scars.   Neurological: She is alert and oriented to person, place, and time.   Skin: Skin is warm and dry. No rash noted.         ED Course   Procedures  Labs Reviewed   CBC W/ AUTO DIFFERENTIAL - Abnormal; Notable for the following components:       Result Value    Hemoglobin 10.6 (*)     Hematocrit 33.3 (*)     MCH 26.3 (*)     MCHC 31.8 (*)     RDW 15.9 (*)     Platelets 119 (*)     All other components within normal limits    Narrative:     ADD-ON ORDER BNP #[id number]; PER ABDOUL LOPEZ PA-C 11/15/2018    16:21   ADD-ON TROP #[id number] LUIS ANGEL LOPEZ PA-C 16:49  11/15/2018    COMPREHENSIVE METABOLIC PANEL - Abnormal; Notable for the following components:    Sodium 135 (*)     CO2 22 (*)     Alkaline Phosphatase 51 (*)     eGFR if non  55.2 (*)     All other components within normal limits    Narrative:     ADD-ON ORDER BNP #[id number]; PER ABDOUL LOPEZ PA-C 11/15/2018    16:21    URINALYSIS, REFLEX TO URINE CULTURE - Abnormal; Notable for the following components:    Appearance, UA Hazy (*)     Leukocytes, UA 3+ (*)     All other components within normal limits   URINALYSIS MICROSCOPIC - Abnormal; Notable for the following components:    WBC, UA 70 (*)     Bacteria, UA Few (*)     All other components within normal limits   B-TYPE NATRIURETIC PEPTIDE - Abnormal; Notable for the following components:     (*)     All other components within normal limits    Narrative:     ADD-ON ORDER BNP #[id number]; LUIS ANGEL LOPEZ PA-C 11/15/2018    16:21   ADD-ON TROP #[id number] LUIS ANGEL ZHANG  AL LOPEZ 16:49  11/15/2018    MAGNESIUM - Abnormal; Notable for the following components:    Magnesium 1.4 (*)     All other components within normal limits    Narrative:     ADD-ON ORDER BNP #[id number]; PER ABDOUL LOPEZ PA-C 11/15/2018    16:21   ADD-ON TROP #[id number] LUIS ANGEL LOPEZ PA-C 16:49  11/15/2018   ADD ON MG AND PHOS PER DR LIZZIE WEBER  11/15/2018  17:42    CULTURE, URINE   LACTIC ACID, PLASMA    Narrative:     ADD-ON ORDER BNP #[id number]; PER ABDOUL LOPEZ PA-C 11/15/2018    16:21    LIPASE    Narrative:     ADD-ON ORDER BNP #[id number]; PER ABDOUL LOPEZ PA-C 11/15/2018    16:21    TROPONIN I   B-TYPE NATRIURETIC PEPTIDE   TROPONIN I    Narrative:     ADD-ON ORDER BNP #[id number]; PER ABDOUL LOPEZ PA-C 11/15/2018    16:21   ADD-ON TROP #[id number] LUIS ANGEL LOPEZ PA-C 16:49  11/15/2018    MAGNESIUM   PHOSPHORUS   PHOSPHORUS    Narrative:     ADD-ON ORDER BNP #[id number]; PER ABDOUL LOPEZ PA-C 11/15/2018    16:21   ADD-ON TROP #[id number] LUIS ANGEL LOPEZ PA-C 16:49  11/15/2018   ADD ON MG AND PHOS PER DR LIZZIE WEBER  11/15/2018  17:42           Imaging Results          X-Ray Abdomen Flat And Erect (Final result)  Result time 11/15/18 17:59:55    Final result by Duglas lAcantara MD (11/15/18 17:59:55)                 Impression:      1. Nonobstructive bowel gas pattern, moderate stool in the colon.      Electronically signed by: Duglas Alcantara MD  Date:    11/15/2018  Time:    17:59             Narrative:    EXAMINATION:  XR ABDOMEN FLAT AND ERECT    CLINICAL HISTORY:  Unspecified abdominal pain    TECHNIQUE:  Flat and erect AP views of the abdomen were performed.    COMPARISON:  05/25/2018    FINDINGS:  One upright view, 2 supine views.    No significant air-fluid levels on upright view.  Air and stool is seen within the large bowel and projected over the rectum.  There is moderate stool throughout the colon.  Postsurgical changes are noted of the bowel.   Scattered air-filled small bowel loops are noted, without significant dilation.  No large volume free air or pneumatosis.  There are no calcifications to convincingly suggest nephrolithiasis or cholelithiasis.  The osseous structures are grossly intact.  The lower lung zones are grossly clear.                               X-Ray Chest AP Portable (Final result)  Result time 11/15/18 17:27:02    Final result by Bird Bailey MD (11/15/18 17:27:02)                 Impression:      No focal consolidation      Electronically signed by: Bird Bailey MD  Date:    11/15/2018  Time:    17:27             Narrative:    EXAMINATION:  XR CHEST AP PORTABLE    CLINICAL HISTORY:  Shortness of breath    TECHNIQUE:  Single frontal view of the chest was performed.    COMPARISON:  Chest radiograph 04/05/2017    FINDINGS:  The lungs are clear.  The cardiac silhouette is unremarkable.  The osseous and soft tissue structures demonstrate no acute abnormality.  Postsurgical changes are noted in the cervical spine.                                       APC / Resident Notes:   73 year old female presenting with worsening abdominal pain.  On exam she is afebrile and nontoxic. Abdomen is soft with mild diffuse tenderness. No rebound or guarding. Poor skin turgor.    DDx includes but is not limited to gastritis, gastroenteritis, colitis, pancreatitis, UTI.    I discussed with GI, .  He is recommending admission for intractable abdominal pain, ischemic colitis r/o.    In the ED today patient's lactate wnl.  Urinalysis with 3+ leukocytes, 70WBCs.  , Troponin 0.006.  Mg 1.4, replaced in ED.    Patient was offered and declined pain meds on multiple reassessments.  Will place in observation for fluids, serial abd exams, pain control per GI recommendation. I discussed the care of this patient with my supervising MD.                    Clinical Impression:   The primary encounter diagnosis was Abdominal pain. Diagnoses of  Shortness of breath and Nausea and vomiting, intractability of vomiting not specified, unspecified vomiting type were also pertinent to this visit.      Disposition:   Disposition: Placed in Observation  Condition: Stable                        Lori Orozco PA-C  11/15/18 1913

## 2018-11-15 NOTE — ED TRIAGE NOTES
Anayeli Judd, a 73 y.o. female presents to the ED w/ complaint of c/o severe abdominal pain since this am at 0100.  Pt was seen yesterday for the same pain but it I worse today.  Pt has hx of stool blockages.    Triage note:  Chief Complaint   Patient presents with    Abdominal Pain     Pt c/o abdominal pain.  She was seen in ED yesterday for same and f/u with GI today.  Sent to ED for possible ischemic bowel.      Review of patient's allergies indicates:   Allergen Reactions    Percocet  [oxycodone-acetaminophen]      Other reaction(s): Itching    Sulfa (sulfonamide antibiotics)      Other reaction(s): Nausea  Other reaction(s): Itching    Adhesive Rash     Past Medical History:   Diagnosis Date    Allergic rhinitis     Arthritis     Blood transfusion     during delivery and     Bowel obstruction     Cervical radiculopathy     followed by dr cloud    Colon cancer     transverse colon; resected; Stage IIA (pT3 pN0 MX)    Diarrhea     Family history of breast cancer     Family history of colon cancer     Fatty liver     GERD (gastroesophageal reflux disease)     History of shingles     Hyperlipidemia     Hypothyroidism     Irritable bowel syndrome     Microscopic colitis     treated     Raynaud phenomenon     Raynaud's disease     Type 2 diabetes mellitus      LOC: Patient name and date of birth verified. The patient is awake, alert and aware of environment with an appropriate affect, the patient is oriented x 3 and speaking appropriately.   APPEARANCE: Patient resting comfortably, patient is clean and well groomed, patient's clothing is properly fastened.  SKIN: The skin is warm and dry, color consistent with ethnicity, patient has normal skin turgor and moist mucus membranes, skin intact, no breakdown or bruising noted.  MUSCULOSKELETAL: Patient moving all extremities well, no obvious swelling or deformities noted.   RESPIRATORY: Respirations are spontaneous, patient  has a normal effort and rate, no accessory muscle use noted. No SOB reported.   CARDIAC: Patient has a normal rate and rhythm, no periphreal edema noted, capillary refill < 3 seconds. No CP at this time.   ABDOMEN: Soft and non tender to palpation, no distention noted. Abdominal pain present at this time.

## 2018-11-15 NOTE — ED NOTES
Pt requesting ice chips. Dr. Wilson made aware, stated pt can only have oral swabs. Pt made aware and moist swabs provided to patient. No new complaints voiced. Family remains at bedside. Will continue to monitor.

## 2018-11-15 NOTE — TELEPHONE ENCOUNTER
----- Message from Allison Wheeler sent at 11/15/2018  8:46 AM CST -----  Contact: Bella argueta/ Dr. Chan Office [phone]  Patient Requesting Sooner Appointment.     Reason for sooner appt.: Small Bowel Obstruction and ended up in the ER turns out it is diarrhea   When is the first available appointment? Unable to access   Communication Preference: Bella argueta/ Dr. Chan Office [phone]  Additional Information:

## 2018-11-15 NOTE — TELEPHONE ENCOUNTER
Receive message from Dr. Chan office requesting an urgent appointment with Dr. Alvarez for Mrs. Judd.     Spoke with patient and she is scheduled to see Dr. Betts today for 2:00.

## 2018-11-15 NOTE — ED NOTES
Rounding completed on patient. Plan of care discussed and patient has no complaints or questions. Pt is in bed awake and alert. Pt is resting comfortably and is in no acute distress.     Respirations are even and unlabored. Pt denies chest pain or SOB. Pt has no complaints at this time.     The bed is in low, locked position with side rails upx2. Call light is in reach, and patient is oriented to call light use. Pt states they will call nurse if they need assistance. Family at the bedside

## 2018-11-16 ENCOUNTER — ANESTHESIA (OUTPATIENT)
Dept: ENDOSCOPY | Facility: HOSPITAL | Age: 73
DRG: 392 | End: 2018-11-16
Payer: MEDICARE

## 2018-11-16 ENCOUNTER — ANESTHESIA EVENT (OUTPATIENT)
Dept: ENDOSCOPY | Facility: HOSPITAL | Age: 73
DRG: 392 | End: 2018-11-16
Payer: MEDICARE

## 2018-11-16 PROBLEM — N30.00 ACUTE CYSTITIS WITHOUT HEMATURIA: Status: ACTIVE | Noted: 2018-11-16

## 2018-11-16 LAB
ANION GAP SERPL CALC-SCNC: 7 MMOL/L
BASOPHILS # BLD AUTO: 0.02 K/UL
BASOPHILS NFR BLD: 0.5 %
BUN SERPL-MCNC: 9 MG/DL
CALCIUM SERPL-MCNC: 8.7 MG/DL
CHLORIDE SERPL-SCNC: 109 MMOL/L
CHOLEST SERPL-MCNC: 115 MG/DL
CHOLEST/HDLC SERPL: 2.6 {RATIO}
CO2 SERPL-SCNC: 26 MMOL/L
CREAT SERPL-MCNC: 0.8 MG/DL
DIFFERENTIAL METHOD: ABNORMAL
EOSINOPHIL # BLD AUTO: 0.2 K/UL
EOSINOPHIL NFR BLD: 4.9 %
ERYTHROCYTE [DISTWIDTH] IN BLOOD BY AUTOMATED COUNT: 15.9 %
EST. GFR  (AFRICAN AMERICAN): >60 ML/MIN/1.73 M^2
EST. GFR  (NON AFRICAN AMERICAN): >60 ML/MIN/1.73 M^2
ESTIMATED AVG GLUCOSE: 117 MG/DL
FERRITIN SERPL-MCNC: 15 NG/ML
FOLATE SERPL-MCNC: 12.7 NG/ML
GLUCOSE SERPL-MCNC: 116 MG/DL
HBA1C MFR BLD HPLC: 5.7 %
HCT VFR BLD AUTO: 30.2 %
HDLC SERPL-MCNC: 45 MG/DL
HDLC SERPL: 39.1 %
HGB BLD-MCNC: 9.5 G/DL
IMM GRANULOCYTES # BLD AUTO: 0 K/UL
IMM GRANULOCYTES NFR BLD AUTO: 0 %
IRON SERPL-MCNC: 41 UG/DL
LDLC SERPL CALC-MCNC: 52 MG/DL
LYMPHOCYTES # BLD AUTO: 1.7 K/UL
LYMPHOCYTES NFR BLD: 39.7 %
MAGNESIUM SERPL-MCNC: 1.7 MG/DL
MCH RBC QN AUTO: 26.6 PG
MCHC RBC AUTO-ENTMCNC: 31.5 G/DL
MCV RBC AUTO: 85 FL
MONOCYTES # BLD AUTO: 0.5 K/UL
MONOCYTES NFR BLD: 12 %
NEUTROPHILS # BLD AUTO: 1.8 K/UL
NEUTROPHILS NFR BLD: 42.9 %
NONHDLC SERPL-MCNC: 70 MG/DL
NRBC BLD-RTO: 0 /100 WBC
PHOSPHATE SERPL-MCNC: 4.2 MG/DL
PLATELET # BLD AUTO: 95 K/UL
PMV BLD AUTO: 10.8 FL
POCT GLUCOSE: 114 MG/DL (ref 70–110)
POCT GLUCOSE: 123 MG/DL (ref 70–110)
POCT GLUCOSE: 157 MG/DL (ref 70–110)
POCT GLUCOSE: 202 MG/DL (ref 70–110)
POTASSIUM SERPL-SCNC: 4.1 MMOL/L
RBC # BLD AUTO: 3.57 M/UL
SATURATED IRON: 9 %
SODIUM SERPL-SCNC: 142 MMOL/L
TOTAL IRON BINDING CAPACITY: 468 UG/DL
TRANSFERRIN SERPL-MCNC: 316 MG/DL
TRIGL SERPL-MCNC: 90 MG/DL
VIT B12 SERPL-MCNC: 579 PG/ML
WBC # BLD AUTO: 4.26 K/UL

## 2018-11-16 PROCEDURE — 88342 IMHCHEM/IMCYTCHM 1ST ANTB: CPT | Mod: 26,,, | Performed by: PATHOLOGY

## 2018-11-16 PROCEDURE — 82746 ASSAY OF FOLIC ACID SERUM: CPT

## 2018-11-16 PROCEDURE — 43239 EGD BIOPSY SINGLE/MULTIPLE: CPT | Performed by: INTERNAL MEDICINE

## 2018-11-16 PROCEDURE — 27000221 HC OXYGEN, UP TO 24 HOURS

## 2018-11-16 PROCEDURE — 83735 ASSAY OF MAGNESIUM: CPT

## 2018-11-16 PROCEDURE — 0DB98ZX EXCISION OF DUODENUM, VIA NATURAL OR ARTIFICIAL OPENING ENDOSCOPIC, DIAGNOSTIC: ICD-10-PCS | Performed by: INTERNAL MEDICINE

## 2018-11-16 PROCEDURE — 82962 GLUCOSE BLOOD TEST: CPT | Performed by: INTERNAL MEDICINE

## 2018-11-16 PROCEDURE — 27201012 HC FORCEPS, HOT/COLD, DISP: Performed by: INTERNAL MEDICINE

## 2018-11-16 PROCEDURE — 0DB68ZX EXCISION OF STOMACH, VIA NATURAL OR ARTIFICIAL OPENING ENDOSCOPIC, DIAGNOSTIC: ICD-10-PCS | Performed by: INTERNAL MEDICINE

## 2018-11-16 PROCEDURE — 83540 ASSAY OF IRON: CPT

## 2018-11-16 PROCEDURE — 43239 EGD BIOPSY SINGLE/MULTIPLE: CPT | Mod: GC,,, | Performed by: INTERNAL MEDICINE

## 2018-11-16 PROCEDURE — 25000003 PHARM REV CODE 250: Performed by: PHYSICIAN ASSISTANT

## 2018-11-16 PROCEDURE — 11000001 HC ACUTE MED/SURG PRIVATE ROOM

## 2018-11-16 PROCEDURE — 63600175 PHARM REV CODE 636 W HCPCS: Performed by: PHYSICIAN ASSISTANT

## 2018-11-16 PROCEDURE — 37000008 HC ANESTHESIA 1ST 15 MINUTES: Performed by: INTERNAL MEDICINE

## 2018-11-16 PROCEDURE — 37000009 HC ANESTHESIA EA ADD 15 MINS: Performed by: INTERNAL MEDICINE

## 2018-11-16 PROCEDURE — 63600175 PHARM REV CODE 636 W HCPCS: Performed by: NURSE ANESTHETIST, CERTIFIED REGISTERED

## 2018-11-16 PROCEDURE — 99222 1ST HOSP IP/OBS MODERATE 55: CPT | Mod: ,,, | Performed by: SURGERY

## 2018-11-16 PROCEDURE — D9220A PRA ANESTHESIA: Mod: ANES,,, | Performed by: ANESTHESIOLOGY

## 2018-11-16 PROCEDURE — 88305 TISSUE EXAM BY PATHOLOGIST: CPT | Performed by: PATHOLOGY

## 2018-11-16 PROCEDURE — 25000003 PHARM REV CODE 250: Performed by: NURSE ANESTHETIST, CERTIFIED REGISTERED

## 2018-11-16 PROCEDURE — 88305 TISSUE EXAM BY PATHOLOGIST: CPT | Mod: 26,,, | Performed by: PATHOLOGY

## 2018-11-16 PROCEDURE — 84100 ASSAY OF PHOSPHORUS: CPT

## 2018-11-16 PROCEDURE — 25000003 PHARM REV CODE 250: Performed by: HOSPITALIST

## 2018-11-16 PROCEDURE — 99223 1ST HOSP IP/OBS HIGH 75: CPT | Mod: AI,,, | Performed by: PHYSICIAN ASSISTANT

## 2018-11-16 PROCEDURE — 82728 ASSAY OF FERRITIN: CPT

## 2018-11-16 PROCEDURE — 85025 COMPLETE CBC W/AUTO DIFF WBC: CPT

## 2018-11-16 PROCEDURE — D9220A PRA ANESTHESIA: Mod: CRNA,,, | Performed by: NURSE ANESTHETIST, CERTIFIED REGISTERED

## 2018-11-16 PROCEDURE — 80061 LIPID PANEL: CPT

## 2018-11-16 PROCEDURE — 36415 COLL VENOUS BLD VENIPUNCTURE: CPT

## 2018-11-16 PROCEDURE — 99223 1ST HOSP IP/OBS HIGH 75: CPT | Mod: GC,,, | Performed by: INTERNAL MEDICINE

## 2018-11-16 PROCEDURE — 80048 BASIC METABOLIC PNL TOTAL CA: CPT

## 2018-11-16 PROCEDURE — 94761 N-INVAS EAR/PLS OXIMETRY MLT: CPT

## 2018-11-16 PROCEDURE — 82607 VITAMIN B-12: CPT

## 2018-11-16 PROCEDURE — 83036 HEMOGLOBIN GLYCOSYLATED A1C: CPT

## 2018-11-16 RX ORDER — DICYCLOMINE HYDROCHLORIDE 10 MG/1
10 CAPSULE ORAL 4 TIMES DAILY
Status: DISCONTINUED | OUTPATIENT
Start: 2018-11-16 | End: 2018-11-16

## 2018-11-16 RX ORDER — PROPOFOL 10 MG/ML
VIAL (ML) INTRAVENOUS
Status: DISCONTINUED | OUTPATIENT
Start: 2018-11-16 | End: 2018-11-16

## 2018-11-16 RX ORDER — DICYCLOMINE HYDROCHLORIDE 10 MG/1
10 CAPSULE ORAL 2 TIMES DAILY
Status: DISCONTINUED | OUTPATIENT
Start: 2018-11-17 | End: 2018-11-19 | Stop reason: HOSPADM

## 2018-11-16 RX ORDER — NAPROXEN SODIUM 220 MG/1
81 TABLET, FILM COATED ORAL DAILY
Status: DISCONTINUED | OUTPATIENT
Start: 2018-11-16 | End: 2018-11-19 | Stop reason: HOSPADM

## 2018-11-16 RX ORDER — ONDANSETRON 2 MG/ML
4 INJECTION INTRAMUSCULAR; INTRAVENOUS ONCE AS NEEDED
Status: CANCELLED | OUTPATIENT
Start: 2018-11-16 | End: 2018-11-16

## 2018-11-16 RX ORDER — FENTANYL CITRATE 50 UG/ML
25 INJECTION, SOLUTION INTRAMUSCULAR; INTRAVENOUS EVERY 5 MIN PRN
Status: CANCELLED | OUTPATIENT
Start: 2018-11-16

## 2018-11-16 RX ORDER — HYDROMORPHONE HYDROCHLORIDE 1 MG/ML
0.2 INJECTION, SOLUTION INTRAMUSCULAR; INTRAVENOUS; SUBCUTANEOUS EVERY 5 MIN PRN
Status: CANCELLED | OUTPATIENT
Start: 2018-11-16

## 2018-11-16 RX ORDER — LIDOCAINE HCL/PF 100 MG/5ML
SYRINGE (ML) INTRAVENOUS
Status: DISCONTINUED | OUTPATIENT
Start: 2018-11-16 | End: 2018-11-16

## 2018-11-16 RX ORDER — MEPERIDINE HYDROCHLORIDE 50 MG/ML
12.5 INJECTION INTRAMUSCULAR; INTRAVENOUS; SUBCUTANEOUS ONCE AS NEEDED
Status: CANCELLED | OUTPATIENT
Start: 2018-11-16 | End: 2018-11-16

## 2018-11-16 RX ORDER — PROPOFOL 10 MG/ML
VIAL (ML) INTRAVENOUS CONTINUOUS PRN
Status: DISCONTINUED | OUTPATIENT
Start: 2018-11-16 | End: 2018-11-16

## 2018-11-16 RX ORDER — DIPHENHYDRAMINE HYDROCHLORIDE 50 MG/ML
25 INJECTION INTRAMUSCULAR; INTRAVENOUS EVERY 6 HOURS PRN
Status: CANCELLED | OUTPATIENT
Start: 2018-11-16

## 2018-11-16 RX ORDER — SODIUM CHLORIDE 9 MG/ML
INJECTION, SOLUTION INTRAVENOUS CONTINUOUS PRN
Status: DISCONTINUED | OUTPATIENT
Start: 2018-11-16 | End: 2018-11-16

## 2018-11-16 RX ADMIN — GABAPENTIN 1200 MG: 400 CAPSULE ORAL at 09:11

## 2018-11-16 RX ADMIN — LIDOCAINE HYDROCHLORIDE 50 MG: 20 INJECTION, SOLUTION INTRAVENOUS at 01:11

## 2018-11-16 RX ADMIN — PROPOFOL 150 MCG/KG/MIN: 10 INJECTION, EMULSION INTRAVENOUS at 01:11

## 2018-11-16 RX ADMIN — LEVOTHYROXINE SODIUM 100 MCG: 0.1 TABLET ORAL at 06:11

## 2018-11-16 RX ADMIN — SODIUM CHLORIDE: 0.9 INJECTION, SOLUTION INTRAVENOUS at 08:11

## 2018-11-16 RX ADMIN — SODIUM CHLORIDE: 0.9 INJECTION, SOLUTION INTRAVENOUS at 01:11

## 2018-11-16 RX ADMIN — PANTOPRAZOLE SODIUM 40 MG: 40 TABLET, DELAYED RELEASE ORAL at 09:11

## 2018-11-16 RX ADMIN — RIFAXIMIN 200 MG: 200 TABLET ORAL at 11:11

## 2018-11-16 RX ADMIN — CEFTRIAXONE SODIUM 1 G: 1 INJECTION, POWDER, FOR SOLUTION INTRAMUSCULAR; INTRAVENOUS at 09:11

## 2018-11-16 RX ADMIN — SODIUM CHLORIDE: 0.9 INJECTION, SOLUTION INTRAVENOUS at 09:11

## 2018-11-16 RX ADMIN — DICYCLOMINE HYDROCHLORIDE 10 MG: 10 CAPSULE ORAL at 09:11

## 2018-11-16 RX ADMIN — RAMELTEON 8 MG: 8 TABLET, FILM COATED ORAL at 09:11

## 2018-11-16 RX ADMIN — ATORVASTATIN CALCIUM 40 MG: 20 TABLET, FILM COATED ORAL at 09:11

## 2018-11-16 RX ADMIN — ACETAMINOPHEN 650 MG: 650 SOLUTION ORAL at 09:11

## 2018-11-16 RX ADMIN — ASPIRIN 81 MG CHEWABLE TABLET 81 MG: 81 TABLET CHEWABLE at 09:11

## 2018-11-16 RX ADMIN — PROPOFOL 70 MG: 10 INJECTION, EMULSION INTRAVENOUS at 01:11

## 2018-11-16 RX ADMIN — RIFAXIMIN 200 MG: 200 TABLET ORAL at 09:11

## 2018-11-16 RX ADMIN — DICYCLOMINE HYDROCHLORIDE 10 MG: 10 CAPSULE ORAL at 04:11

## 2018-11-16 RX ADMIN — POLYETHYLENE GLYCOL 3350 17 G: 17 POWDER, FOR SOLUTION ORAL at 09:11

## 2018-11-16 NOTE — ASSESSMENT & PLAN NOTE
- US with >60% stenosis, high SMA  - vascular sx consulted  - start on ASA, continue statin, check lipid panel in AM  - NPO MN

## 2018-11-16 NOTE — PLAN OF CARE
ID band verified. POC and procedure reviewed with patient. Patient verbalizes understanding and has no questions at this time.

## 2018-11-16 NOTE — CONSULTS
[facility]  Gastroenterology  Consult Note    Patient Name: Anayeli Judd  MRN: [id number]  Admission Date: 11/15/2018  Hospital Length of Stay: 0 days  Code Status: Full Code   Attending Provider: Stewart Noe MD   Consulting Provider: Guerrero Betts MD  Primary Care Physician: Rocio Mc MD  Principal Problem:Superior mesenteric artery stenosis    Inpatient consult to Gastroenterology  Consult performed by: Guerrero Betts MD  Consult ordered by: Guerrero Betts MD        Subjective:     HPI:  Anayeli Judd is a 73 y.o. female with a history of CRC, multiple admissions for SBO, IBS, microscopic colitis, HLD, T2DM, GERD, Hypothyroidism, who is presenting for post-hospitalization follow-up of abdominal pain.     She follows in GI clinic with Dr. Alvarez, last seen 8/28/17 for evaluation of diarrhea. She had had recently undergone (7/2017) a colonoscopy given history of microscopic colitis (lymphocytic) which was negative, and was felt that her symptoms could be IBS, post-ccy diarrhea, vs SIBO related. She was recommended for trial of cholestyramine, probiotic and consideration of trial of rifaximin.     She presents to the GI clinic today for follow-up after being seen in the ED on 11/14. Per review of ED note, she presented with cramping abdominal pain x 2 days. She endorsed constipation initially which had transitioned to diarrhea s/p laxative therapy. Ed workup including a negative lab workup with exception of lactate 2.5. CT AP was obtained due to concern for recurrent SBO which was negative for pathology with exception of mild hydronephrosis.      Today she reports that she has acute worsening of her abdominal pain. She notes that her abdominal pain has been sam-umilical with radiation to the lower abdomen/suprapubic region. She notes the pain started ~1-2 weeks ago. She notes that this morning she was awoken at ~1:30AM due to severe abdominal pain which resolved after 10  minutes. Later after eating breakfast (30 min - 1 hr) she noted the development of recurrent abdominal pain. Pain currently 8/10. Pain is associated with nausea, no vomitus. No dysphagia or odynophagia or reflux. She denies identifying any single food group which worsens her symptoms and has not had relief despite modifying her diet.     Regarding bowel habits she notes that recently she had been doing well with fiber supplementation, however over the past few weeks she has developed urgency to move bowels ~30 minutes after a meal. She notes occasional incontinence if unable to reach the bathroom. She notes that in the AM will have a formed BM but as day progresses she will have more loose bowel movements (non-bloody/non-melenic/non-mucus). Will have 3-4BM/day currently.   On review she endorses recently completing a 3ms course of antibiotics for urinary inection. Denies any urinary symptoms. Does not feel like she has a urinary infection (UA was negative in ED, culture pending).    Since hospitalization she notes that she feels somewhat better. She notes that they did a straight cath and noted some improvement in her lower abdominal pressure but still has intermittent abdominal pain (mid-epigastric with radiation to the supra-pubic region). She notes she is hungry but has not yet tried to eat any food since hospitalization. Denies any other new complaints or changes in symptoms since clinic evaluation 11/15.     PEndoHx:  Colonoscopy. (17) Provider: Dr. Alvarez. Indication: Diarrhea. Extent: Cecum. Preparation:Adequate.  - 5mm transverse polyp  - 2x 3mm transverse polyp [tubular adenoma]  - random colonic biopsies [negative for microscopic colitis]        Past Medical History:   Diagnosis Date    Allergic rhinitis     Arthritis     Blood transfusion     during delivery and     Bowel obstruction     Cervical radiculopathy     followed by dr cloud    Colon cancer     transverse colon; resected;  Stage IIA (pT3 pN0 MX)    Diarrhea     Family history of breast cancer     Family history of colon cancer     Fatty liver     GERD (gastroesophageal reflux disease)     History of shingles     Hyperlipidemia     Hypothyroidism     Irritable bowel syndrome     Microscopic colitis     treated 2013    Raynaud phenomenon     Raynaud's disease     Type 2 diabetes mellitus        Past Surgical History:   Procedure Laterality Date    APPENDECTOMY      BACK SURGERY      CARPAL TUNNEL RELEASE      bilateral      SECTION      CHOLECYSTECTOMY  1965    COLECTOMY  2011    Transverse colon resection by Dr. Aguirre    COLONOSCOPY N/A 2017    Procedure: COLONOSCOPY;  Surgeon: Manjit Alvarez MD;  Location: [facility] (4TH FLR);  Service: Endoscopy;  Laterality: N/A;    COLONOSCOPY N/A 2017    Performed by Majnit [facility]varez MD at [facility] (4TH FLR)    COLONOSCOPY N/A 2013    Performed by Angelo Reynolds MD at [facility] (4TH FLR)    EXPLORATORY-LAPAROTOMY N/A 2017    Performed by Herman Fletcher [facility] at [facility] OR 2ND FLR    EXPLORATORY-LAPAROTOMY N/A 2014    Performed by Gonzalez Silvestre [facility] at [facility] OR 2ND FLR    EYE SURGERY      Cataract Removal    HYSTERECTOMY      LYSIS-ADHESION N/A 2017    Performed by [facility] at [facility] 2ND FLR    LYSIS-ADHESION N/A 2014    Performed by [facility] at [facility] OR 2ND FLR    PLACEMENT  2017    Performed by Herman [facility] at [facility] OR 2ND FLR    posterolateral fusion with autograft bone and [place] mineralized bone matrix  13    at [facility] for lumbar spine stenosis    REPAIR  2017    Performed by Herman [facility] at [facility] 2ND FLR    RESECTION - SMALL BOWEL N/A 2014    Performed by [facility] at [facility] 2ND FLR    TOE SURGERY      TONSILLECTOMY      TRIGGER FINGER RELEASE         Review of patient's allergies indicates:   Allergen Reactions    Percocet   [oxycodone-acetaminophen]      Other reaction(s): Itching    Sulfa (sulfonamide antibiotics)      Other reaction(s): Nausea  Other reaction(s): Itching    Adhesive Rash     Family History     Problem Relation (Age of Onset)    Alcohol abuse Brother, Brother    Arthritis Daughter, Brother    Asthma Daughter    Bladder Cancer Mother    Breast cancer Sister (79)    Cataracts Mother    Colon cancer Father, Brother (70)    Depression Daughter, Daughter    Glaucoma Mother    Heart disease Father (50), Mother    Hyperlipidemia Mother    Hypertension Daughter    Kidney disease Mother    Parkinsonism Brother    Stroke Daughter (40)        Tobacco Use    Smoking status: Never Smoker    Smokeless tobacco: Never Used   Substance and Sexual Activity    Alcohol use: Yes     Alcohol/week: 3.0 oz     Types: 5 Glasses of wine per week     Comment: socially    Drug use: No    Sexual activity: No     Birth control/protection: Abstinence     Review of Systems   Constitutional: Negative for appetite change, chills, diaphoresis, fatigue and unexpected weight change.   HENT: Negative for sore throat and trouble swallowing.    Eyes: Negative for visual disturbance.   Respiratory: Negative for cough and shortness of breath.    Cardiovascular: Negative for chest pain.   Gastrointestinal: Positive for abdominal pain, diarrhea and nausea. Negative for anal bleeding, blood in stool, constipation and vomiting.   Genitourinary: Negative for difficulty urinating (has a pessary in place), dysuria and hematuria.   Musculoskeletal: Negative for arthralgias and myalgias.   Skin: Negative for rash.   Neurological: Negative for dizziness and headaches.   Psychiatric/Behavioral: Negative for agitation and confusion.     Objective:     Vital Signs (Most Recent):  Temp: 98.2 °F (36.8 °C) (11/16/18 0744)  Pulse: (!) 57 (11/16/18 0744)  Resp: 16 (11/16/18 0744)  BP: (!) 112/57 (11/16/18 0744)  SpO2: 96 % (11/16/18 0744) Vital Signs (24h Range):  Temp:   [97.3 °F (36.3 °C)-98.2 °F (36.8 °C)] 98.2 °F (36.8 °C)  Pulse:  [57-75] 57  Resp:  [16-18] 16  SpO2:  [96 %-100 %] 96 %  BP: (106-145)/(54-68) 112/57     Weight: 65.8 kg (145 lb 0 oz) (11/15/18 2135)  Body mass index is 26.52 kg/m².      Intake/Output Summary (Last 24 hours) at 11/16/2018 0825  Last data filed at 11/15/2018 2300  Gross per 24 hour   Intake --   Output 388 ml   Net -388 ml       Lines/Drains/Airways     Drain                 Gastrostomy/Enterostomy 04/01/17 1024 Gastrostomy tube w/o balloon LUQ decompression 593 days                Physical Exam   Constitutional: She is oriented to person, place, and time. She appears well-developed and well-nourished.   Well appearing in no distress. Accompanied by daughter at bedside. Speaking in full sentences, appears comfortable.   HENT:   Head: Normocephalic and atraumatic.   Mouth/Throat: No oropharyngeal exudate.   Eyes: Conjunctivae are normal. No scleral icterus.   Cardiovascular: Normal rate, regular rhythm, normal heart sounds and intact distal pulses.   Pulmonary/Chest: Effort normal and breath sounds normal. No respiratory distress.   Abdominal: Soft. Bowel sounds are normal. She exhibits no distension and no mass. There is no tenderness. There is no rebound and no guarding.   Endorses mid-epigastric pain but abdomen is soft, non-tender.   Musculoskeletal: She exhibits no edema or tenderness.   Lymphadenopathy:     She has no cervical adenopathy.   Neurological: She is alert and oriented to person, place, and time.   Skin: Skin is warm. Capillary refill takes less than 2 seconds.   Psychiatric: She has a normal mood and affect. Her behavior is normal. Judgment and thought content normal.   Nursing note and vitals reviewed.      Significant Labs:  CBC:   Recent Labs   Lab 11/14/18  1546 11/15/18  1530 11/16/18  0409   WBC 7.25 6.63 4.26   HGB 11.6* 10.6* 9.5*   HCT 41.2 33.3* 30.2*   * 119* 95*     CMP:   Recent Labs   Lab 11/15/18  1538  11/16/18  0409   GLU 77 116*   CALCIUM 9.4 8.7   ALBUMIN 3.8  --    PROT 7.3  --    * 142   K 3.9 4.1   CO2 22* 26    109   BUN 11 9   CREATININE 1.0 0.8   ALKPHOS 51*  --    ALT 25  --    AST 33  --    BILITOT 0.7  --      Coagulation: No results for input(s): PT, INR, APTT in the last 48 hours.    Significant Imaging:  Imaging results within the past 24 hours have been reviewed.    Assessment/Plan:     Abdominal pain    Anayeli Judd is a 73 y.o. female who is presenting for post-hospitalization follow-up of abdominal pain.     Unclear the etiology of her acute worsening abdominal pain. She had an evaluation in the ED on 11/14 with a negative workup for acute pathology and negative lab evaluation with exception of slightly elevated lactate.    Differentials to consider:  - mesenteric ischemia. Unlikely given only minimal stenosis on mesenteric ultrasound and symptoms are not completely consistent with chronic mesenteric ischemia. Lactate negative on admission.    - PUD/Gastritis. Could explain worsening abdominal pain and relationship with PO intake (i.e. Gastric PUD). Though would not account for the radiation of her pain to suprapubic region. H&H remains at baseline and denies any gross evidence of bleeding, though iron deficient on labs. She has history of NSAID use which could predispose to PUD. Is on protonix currently.    - IBS. She has a history of IBS but need to exclude other etiologies before ascribing symptoms to her IBS.    - pancreatitis. Not likely given her negative CTAP and negative lipase on 11/14.    - SIBO. Given history of colon resection and multiple course of antibiotics. Can consider empiric trial of rifaximin.    - SBO. Though has a significant history of SBO, unlikely current presentation is secondary to SBO given +ve BM and +ve flatus and abdominal examination which is benign. Pain could be secondary to intermittent internal hernia not visualized on imaging.    -  cholelithiasis. She is s/p CCY and no evidence of biliary dilatation on CT on 11/14    - urinary etiology. Though she denies any dysuria, the CT scan is concerning for bladder distension and hydronephrosis. No CVA tenderness on examination and UA on 11/14 is not definitive for UTI. Symptoms could be secondary to acute urinary retention. This remains in differential given some improvement in her symptoms s/p straight cath.        Plan:  - EGD today to evaluate and exclude PUD  - recommend consultation with urology given bladder distension and hydronephrosis with history of pessary for urinary issues. Symptoms could be partly secondary to bladder distension and would want to address to avoid post-renal injury.  - continue protonix BID  - can continue empirical treatment with rifaximin for ?LORIE         Thank you for your consult. I will follow-up with patient. Please contact us if you have any additional questions.    Guerrero Betts MD  Gastroenterology  [facility]

## 2018-11-16 NOTE — ANESTHESIA POSTPROCEDURE EVALUATION
"Anesthesia Post Evaluation    Patient: Anayeli Judd    Procedure(s) Performed: Procedure(s) (LRB):  EGD (ESOPHAGOGASTRODUODENOSCOPY) (N/A)    Final Anesthesia Type: general  Patient location during evaluation: PACU  Patient participation: Yes- Able to Participate  Level of consciousness: awake and alert  Post-procedure vital signs: reviewed and stable  Pain management: adequate  Airway patency: patent  PONV status at discharge: No PONV  Anesthetic complications: no      Cardiovascular status: blood pressure returned to baseline  Respiratory status: unassisted  Hydration status: euvolemic  Follow-up not needed.        Visit Vitals  BP (!) 108/56   Pulse (!) 58   Temp 36.7 °C (98 °F) (Axillary)   Resp 18   Ht 5' 2" (1.575 m)   Wt 65.8 kg (145 lb 0 oz)   LMP  (LMP Unknown)   SpO2 100%   Breastfeeding? No   BMI 26.52 kg/m²       Pain/Roscoe Score: Pain Assessment Performed: Yes (11/16/2018  2:02 PM)  Presence of Pain: denies (11/16/2018  2:02 PM)  Pain Rating Prior to Med Admin: 3 (11/15/2018 11:27 PM)  Pain Rating Post Med Admin: 0 (11/15/2018 11:57 PM)  Roscoe Score: 10 (11/16/2018  2:30 PM)        "

## 2018-11-16 NOTE — [employer] PATIENT INSTRUCTIONS
Discharge Summary/Instructions after an Endoscopic Procedure  Patient Name: Anyaeli Judd  Patient MRN: [id number]  Patient YOB: 1945 Friday, November 16, 2018  Angelo Reynolds MD  RESTRICTIONS:  During your procedure today, you received medications for sedation.  These   medications may affect your judgment, balance and coordination.  Therefore,   for 24 hours, you have the following restrictions:   - DO NOT drive a car, operate machinery, make legal/financial decisions,   sign important papers or drink alcohol.    ACTIVITY:  Today: no heavy lifting, straining or running due to procedural   sedation/anesthesia.  The following day: return to full activity including work.  DIET:  Eat and drink normally unless instructed otherwise.     TREATMENT FOR COMMON SIDE EFFECTS:  - Mild abdominal pain, nausea, belching, bloating or excessive gas:  rest,   eat lightly and use a heating pad.  - Sore Throat: treat with throat lozenges and/or gargle with warm salt   water.  - Because air was used during the procedure, expelling large amounts of air   from your rectum or belching is normal.  - If a bowel prep was taken, you may not have a bowel movement for 1-3 days.    This is normal.  SYMPTOMS TO WATCH FOR AND REPORT TO YOUR PHYSICIAN:  1. Abdominal pain or bloating, other than gas cramps.  2. Chest pain.  3. Back pain.  4. Signs of infection such as: chills or fever occurring within 24 hours   after the procedure.  5. Rectal bleeding, which would show as bright red, maroon, or black stools.   (A tablespoon of blood from the rectum is not serious, especially if   hemorrhoids are present.)  6. Vomiting.  7. Weakness or dizziness.  GO DIRECTLY TO THE NEAREST EMERGENCY ROOM IF YOU HAVE ANY OF THE FOLLOWING:      Difficulty breathing              Chills and/or fever over 101 F   Persistent vomiting and/or vomiting blood   Severe abdominal pain   Severe chest pain   Black, tarry stools   Bleeding- more than one  tablespoon   Any other symptom or condition that you feel may need urgent attention  Your doctor recommends these additional instructions:  If any biopsies were taken, your doctors clinic will contact you in 1 to 2   weeks with any results.  - Patient has a contact number available for emergencies.  The signs and   symptoms of potential delayed complications were discussed with the   patient.  Return to normal activities tomorrow.  Written discharge   instructions were provided to the patient.   - Return patient to [facility] for ongoing care.   - Advance diet as tolerated.   - Await pathology results.   - Return to referring physician.   For questions, problems or results please call your physician - Angelo Reynolds MD at Work:  [phone].  [facility], EMERGENCY ROOM PHONE NUMBER: [phone]  IF A COMPLICATION OR EMERGENCY SITUATION ARISES AND YOU ARE UNABLE TO REACH   YOUR PHYSICIAN - GO DIRECTLY TO THE EMERGENCY ROOM.  Angelo Reynolds MD  11/16/2018 1:55:46 PM  This report has been verified and signed electronically.  [employer]

## 2018-11-16 NOTE — H&P
[facility] Hospital Medicine  History & Physical    Patient Name: Anayeli Judd  MRN: [id number]  Admission Date: 11/15/2018  Attending Physician: Stewart Noe MD   Primary Care Provider: Rocio Mc MD    [facility] Medicine Team: [facility] Camilo Burgos PA-C     Patient information was obtained from patient, past medical records and ER records.     Subjective:     Principal Problem:Superior mesenteric artery stenosis    Chief Complaint:   Chief Complaint   Patient presents with    Abdominal Pain     Pt c/o abdominal pain.  She was seen in ED yesterday for same and f/u with GI today.  Sent to ED for possible ischemic bowel.         HPI: Anayeli Judd is a 73F with history of CRC, multiple admissions for SBO, IBS, microscopic colitis, HLD, T2DM, GERD, Hypothyroidism,  incompelte bladder emptying, frequent UTIs, who is presenting for evaluation of abdominal pain. She reports off and on abdominal pain for the last 2 weeks, usually worse 30 min - 1 hour after eating. Pain is epigastric that radiates to suprapubic, it is sharp and extreme. It spontaneously resolves with time. She was seen in the ED  for these complaints and d/c'd home after negative work up including CT A/P which showed hydronephrosis and a LA of 2.5.    She then presented to GI clinic the next day with worsening of abdominal pain. Associated with nausea, no emesis. She will have normal BM in the AM, followed by diarrhea in the day, no blood. She recently completed a 3 month course of keflex for chronic UTIs and she denies any urinary symptoms and reports frequency, but not dysuria. It was recommended she present to the ED from GI clinic. She denies fever, chills, CP, SOB, HA.    ED: CBC WNL, LA WNL, UA+, troponin and EKG negative, KUB negative.    Past Medical History:   Diagnosis Date    Allergic rhinitis     Arthritis     Blood transfusion     during delivery and     Bowel obstruction      Cervical radiculopathy     followed by dr reynolds    Colon cancer     transverse colon; resected; Stage IIA (pT3 pN0 MX)    Diarrhea     Family history of breast cancer     Family history of colon cancer     Fatty liver     GERD (gastroesophageal reflux disease)     History of shingles     Hyperlipidemia     Hypothyroidism     Irritable bowel syndrome     Microscopic colitis     treated     Raynaud phenomenon     Raynaud's disease     Type 2 diabetes mellitus        Past Surgical History:   Procedure Laterality Date    APPENDECTOMY      BACK SURGERY      CARPAL TUNNEL RELEASE      bilateral      SECTION      CHOLECYSTECTOMY  1965    COLECTOMY  2011    Transverse colon resection by Dr. Aguirre    COLONOSCOPY N/A 2017    Procedure: COLONOSCOPY;  Surgeon: Manjit Alvarez MD;  Location: [facility] (4TH FLR);  Service: Endoscopy;  Laterality: N/A;    COLONOSCOPY N/A 2017    Performed by Manjit Alvarez MD at [facility] ENDO (4TH FLR)    COLONOSCOPY N/A 2013    Performed by Angelo Reynolds MD at [facility] ENDO (4TH FLR)    EXPLORATORY-LAPAROTOMY N/A 2017    Performed by Herman Fletcher MD at [facility] OR 2ND FLR    EXPLORATORY-LAPAROTOMY N/A 2014    Performed by Gonzalez Silvestre MD at [facility] OR 2ND FLR    EYE SURGERY      Cataract Removal    HYSTERECTOMY      LYSIS-ADHESION N/A 2017    Performed by Herman Fletcher MD at [facility] OR 2ND FLR    LYSIS-ADHESION N/A 2014    Performed by Gonzalez Silvestre MD at [facility] OR 2ND FLR    PLACEMENT  2017    Performed by Herman Fletcher MD at [facility] OR 2ND FLR    posterolateral fusion with autograft bone and Lowden mineralized bone matrix  13    at [facility] for [facility] stenosis    REPAIR  2017    Performed by Herman Fletcher MD at [facility] OR 2ND FLR    RESECTION - SMALL BOWEL N/A 2014    [facility]    TOE SURGERY      TONSILLECTOMY      TRIGGER  "FINGER RELEASE         Review of patient's allergies indicates:   Allergen Reactions    Percocet  [oxycodone-acetaminophen]      Other reaction(s): Itching    Sulfa (sulfonamide antibiotics)      Other reaction(s): Nausea  Other reaction(s): Itching    Adhesive Rash       No current facility-administered medications on file prior to encounter.      Current Outpatient Medications on File Prior to Encounter   Medication Sig    atorvastatin (LIPITOR) 40 MG tablet Take 1 tablet (40 mg total) by mouth once daily.    betamethasone dipropionate (DIPROLENE) 0.05 % ointment Apply every Am x 2 weeks then twice weekly    blood sugar diagnostic Strp Dispense Tanium contour strips; test blood sugar once daily    blood-glucose meter (CONTOUR METER) kit Please dispense Genterpret Contour meter and supplies Use as instructed Test Blood sugar once daily    conjugated estrogens (PREMARIN) vaginal cream Place 0.5 g vaginally every evening.    diphenhydrAMINE (BENADRYL) 25 mg capsule Take 1 each (25 mg total) by mouth nightly as needed for Itching, Allergies or Insomnia.    fluticasone (FLONASE) 50 mcg/actuation nasal spray 2 sprays by Each Nare route once daily.    FREESTYLE EFREN READER [facility] TEST as directed    FREESTYLE EFREN SENSOR Kit     gabapentin (NEURONTIN) 600 MG tablet Take 1,200 mg by mouth 2 (two) times daily.     hydrocortisone 2.5 % cream     insulin glargine (LANTUS SOLOSTAR U-100 INSULIN) 100 unit/mL (3 mL) InPn pen Inject 10 Units into the skin every evening. (Patient taking differently: Inject 12 Units into the skin every evening. )    insulin syringe-needle U-100 0.3 mL 31 gauge x 15/64" Syrg 1 Syringe by Misc.(Non-Drug; Combo Route) route once daily.    ipratropium (ATROVENT) 0.06 % nasal spray     lancets (ONE TOUCH ULTRASOFT LANCETS) Misc Test blood sugar once daily    levothyroxine (SYNTHROID) 100 MCG tablet TAKE 1 TABLET BY MOUTH EVERY MORNING    linagliptin (TRADJENTA) 5 mg Tab tablet Take 1 " tablet (5 mg total) by mouth once daily.    magnesium 30 mg Tab Take 500 capsules by mouth. Patient's taking magnesium 500mg QD    meloxicam (MOBIC) 15 MG tablet TAKE 1 TABLET BY MOUTH DAILY WITH FOOD AS NEEDED FOR PAIN OR ARTHRITIS    metFORMIN (GLUCOPHAGE-XR) 750 MG 24 hr tablet Take 1 tablet (750 mg total) by mouth 2 (two) times daily with meals.    metronidazole 0.75% (METROCREAM) 0.75 % Crea     mirabegron 50 mg Tb24 Take 1 tablet (50 mg total) by mouth once daily.    ondansetron (ZOFRAN) 4 MG tablet Take 1 tablet (4 mg total) by mouth every 8 (eight) hours as needed for Nausea.    ospemifene (OSPHENA) 60 mg Tab Take 60 mg by mouth once daily.    pantoprazole (PROTONIX) 40 MG tablet TAKE 1 TABLET BY MOUTH ONCE DAILY    polyethylene glycol (GLYCOLAX) 17 gram/dose powder Take 17 g by mouth once daily.    triamcinolone acetonide 0.1% (KENALOG) 0.1 % paste apply to affected area with A Q-TIP three times a day    VIT A/VIT C/VIT E/ZINC/COPPER (ICAPS AREDS ORAL) Take by mouth 2 (two) times daily.    vitamin D 1000 units Tab Take 185 mg by mouth once daily. D3    azelastine (ASTELIN) 137 mcg nasal spray 1 spray (137 mcg total) by Nasal route 2 (two) times daily.     Family History     Problem Relation (Age of Onset)    Alcohol abuse Brother, Brother    Arthritis Daughter, Brother    Asthma Daughter    Bladder Cancer Mother    Breast cancer Sister (79)    Cataracts Mother    Colon cancer Father, Brother (70)    Depression Daughter, Daughter    Glaucoma Mother    Heart disease Father (50), Mother    Hyperlipidemia Mother    Hypertension Daughter    Kidney disease Mother    Parkinsonism Brother    Stroke Daughter (40)        Tobacco Use    Smoking status: Never Smoker    Smokeless tobacco: Never Used   Substance and Sexual Activity    Alcohol use: Yes     Alcohol/week: 3.0 oz     Types: 5 Glasses of wine per week     Comment: socially    Drug use: No    Sexual activity: No     Birth control/protection:  Abstinence     Review of Systems   Constitutional: Positive for appetite change and fatigue. Negative for chills, fever and unexpected weight change.   Respiratory: Negative for cough, shortness of breath and wheezing.    Cardiovascular: Negative for chest pain, palpitations and leg swelling.   Gastrointestinal: Positive for abdominal pain, diarrhea and nausea. Negative for abdominal distention, anal bleeding, blood in stool and vomiting.   Genitourinary: Positive for difficulty urinating (chronic). Negative for dysuria, flank pain and vaginal discharge.   Musculoskeletal: Negative for back pain and gait problem.   Skin: Negative for rash and wound.   Neurological: Negative for dizziness, weakness and headaches.   Psychiatric/Behavioral: Negative for agitation and confusion.     Objective:     Vital Signs (Most Recent):  Temp: 97.5 °F (36.4 °C) (11/15/18 2333)  Pulse: (!) 59 (11/15/18 2333)  Resp: 18 (11/15/18 2333)  BP: (!) 125/58 (11/15/18 2333)  SpO2: 99 % (11/15/18 2333) Vital Signs (24h Range):  Temp:  [97.3 °F (36.3 °C)-97.7 °F (36.5 °C)] 97.5 °F (36.4 °C)  Pulse:  [59-75] 59  Resp:  [16-18] 18  SpO2:  [98 %-100 %] 99 %  BP: (106-145)/(55-68) 125/58     Weight: 65.8 kg (145 lb)  Body mass index is 26.52 kg/m².    Physical Exam   Constitutional: She is oriented to person, place, and time. She appears well-developed and well-nourished. No distress.   HENT:   Head: Normocephalic and atraumatic.   Eyes: EOM are normal.   Neck: Normal range of motion. Neck supple.   Cardiovascular: Normal rate and regular rhythm.   No murmur heard.  Pulmonary/Chest: Effort normal and breath sounds normal. No respiratory distress.   Abdominal: Soft. Bowel sounds are normal. She exhibits no distension. There is tenderness (epigastic). There is no rebound.   Musculoskeletal: Normal range of motion. She exhibits no edema.   Neurological: She is alert and oriented to person, place, and time. No cranial nerve deficit.   Skin: Skin is  warm and dry. She is not diaphoretic.   Psychiatric: She has a normal mood and affect. Her behavior is normal. Judgment and thought content normal.   Nursing note and vitals reviewed.        CRANIAL NERVES     CN III, IV, VI   Extraocular motions are normal.        Significant Labs:   BMP:   Recent Labs   Lab 11/15/18  1530   GLU 77   *   K 3.9      CO2 22*   BUN 11   CREATININE 1.0   CALCIUM 9.4   MG 1.4*     CBC:   Recent Labs   Lab 11/14/18  1546 11/15/18  1530   WBC 7.25 6.63   HGB 11.6* 10.6*   HCT 41.2 33.3*   * 119*     CMP:   Recent Labs   Lab 11/14/18  1546 11/15/18  1530    135*   K 4.2 3.9    102   CO2 16* 22*   GLU 66* 77   BUN 11 11   CREATININE 0.9 1.0   CALCIUM 9.7 9.4   PROT 7.7 7.3   ALBUMIN 3.9 3.8   BILITOT 0.8 0.7   ALKPHOS 51* 51*   AST 32 33   ALT 25 25   ANIONGAP 15 11   EGFRNONAA >60.0 55.2*     Cardiac Markers:   Recent Labs   Lab 11/15/18  1530   *     Troponin:   Recent Labs   Lab 11/15/18  1530   TROPONINI <0.006     TSH:   Recent Labs   Lab 11/12/18  0801   TSH 0.832     Urine Culture:   Recent Labs   Lab 11/14/18  1546   LABURIN GRAM NEGATIVE TENZIN  >100,000 cfu/ml  Identification and susceptibility pending       Urine Studies:   Recent Labs   Lab 11/15/18  1549   COLORU Yellow   APPEARANCEUA Hazy*   PHUR 5.0   SPECGRAV 1.010   PROTEINUA Negative   GLUCUA Negative   KETONESU Negative   BILIRUBINUA Negative   OCCULTUA Negative   NITRITE Negative   LEUKOCYTESUR 3+*   RBCUA 1   WBCUA 70*   BACTERIA Few*   SQUAMEPITHEL 0   HYALINECASTS 1       Significant Imaging: I have reviewed all pertinent imaging results/findings within the past 24 hours.    Assessment/Plan:     * Superior mesenteric artery stenosis    - US with >60% stenosis, high SMA  - vascular sx consulted  - start on ASA, continue statin, check lipid panel in AM  - NPO MN     Hydronephrosis    - incidentally seen on CT from prior ED visit  - no stones, h/o of incomplete bladder emptying   -  patient reports she is urinating normally, but going frequently  - renal US continues to show hydronephrosis with distended bladder  - urology consulted  - in/out cath by RN improved some of patient's symptoms     Abdominal pain    - seen by GI in clinic today with extensive recommendations, very much appreciated  - will implement the following and consult GI if abdominal pain remains  - increase PPI to BID, trial of rifaximin for ?SIBO given colon resection and multiple abx for UTIs  - possibly urologic etiology as well, see hydronephrosis   - CLD, ADAT     Frequent UTI    UTI  - was on keflex daily for 3 months as PPX  - UA from 11/14 with GNR, follow sensitivities   - start empiric daily rocephin  - 0/4 SIRS     Anemia    - chronic and stable  - check iron, B12, folate with AM labs     Chronic kidney disease, stage III (moderate)    - chronic and stable     Hyponatremia    - hypovolemic  - IVFs  - trend     Hypomagnesemia    - replaced in ED, trend daily     Mixed stress and urge urinary incontinence    - home mirabegron not on formulary, patient may take home med if brings it in  - PRN bladder scan and cath     Type 2 diabetes mellitus without complication, without long-term current use of insulin    - hold oral hypoglycemic agents  - home insulin: detemir 12U nightly,   - hospital insulin: detemir 10 U nightly, low dose SSI  - A1c     GERD (gastroesophageal reflux disease)    - PPI increased to BID       VTE Risk Mitigation (From admission, onward)        Ordered     Place JENNIFER hose  Until discontinued      11/15/18 1920     Place sequential compression device  Until discontinued      11/15/18 1920     IP VTE HIGH RISK PATIENT  Once      11/15/18 1920             Camilo Burgos PA-C  Department of Hospital Medicine   [facility]

## 2018-11-16 NOTE — ASSESSMENT & PLAN NOTE
Anayeli Judd is a 73 y.o. female who is presenting for post-hospitalization follow-up of abdominal pain.     Unclear the etiology of her acute worsening abdominal pain. She had an evaluation in the ED on 11/14 with a negative workup for acute pathology and negative lab evaluation with exception of slightly elevated lactate.    Differentials to consider:  - mesenteric ischemia. Unlikely given only minimal stenosis on mesenteric ultrasound and symptoms are not completely consistent with chronic mesenteric ischemia. Lactate negative on admission.    - PUD/Gastritis. Could explain worsening abdominal pain and relationship with PO intake (i.e. Gastric PUD). Though would not account for the radiation of her pain to suprapubic region. H&H remains at baseline and denies any gross evidence of bleeding, though iron deficient on labs. She has history of NSAID use which could predispose to PUD. Is on protonix currently.    - IBS. She has a history of IBS but need to exclude other etiologies before ascribing symptoms to her IBS.    - pancreatitis. Not likely given her negative CTAP and negative lipase on 11/14.    - SIBO. Given history of colon resection and multiple course of antibiotics. Can consider empiric trial of rifaximin.    - SBO. Though has a significant history of SBO, unlikely current presentation is secondary to SBO given +ve BM and +ve flatus and abdominal examination which is benign. Pain could be secondary to intermittent internal hernia not visualized on imaging.    - cholelithiasis. She is s/p CCY and no evidence of biliary dilatation on CT on 11/14    - urinary etiology. Though she denies any dysuria, the CT scan is concerning for bladder distension and hydronephrosis. No CVA tenderness on examination and UA on 11/14 is not definitive for UTI. Symptoms could be secondary to acute urinary retention. This remains in differential given some improvement in her symptoms s/p straight cath.        Plan:  - EGD  today to evaluate and exclude PUD  - recommend consultation with urology given bladder distension and hydronephrosis with history of pessary for urinary issues. Symptoms could be partly secondary to bladder distension and would want to address to avoid post-renal injury.  - continue protonix BID  - can continue empirical treatment with rifaximin for ?SIBO

## 2018-11-16 NOTE — HPI
Anayeli Judd is a 73F with history of CRC, multiple admissions for SBO, IBS, microscopic colitis, HLD, T2DM, GERD, Hypothyroidism, incompelte bladder emptying, frequent UTIs, who is presenting for evaluation of abdominal pain. She reports off and on abdominal pain for the last 2 weeks, usually worse 30 min - 1 hour after eating. Pain is epigastric that radiates to suprapubic, it is sharp and extreme. It spontaneously resolves with time. She was seen in the ED 11/14 for these complaints and d/c'd home after negative work up including CT A/P which showed hydronephrosis and a LA of 2.5.    She then presented to GI clinic the next day with worsening of abdominal pain. Associated with nausea, no emesis. She will have normal BM in the AM, followed by diarrhea in the day, no blood. She recently completed a 3 month course of keflex for chronic UTIs and she denies any urinary symptoms and reports frequency, but not dysuria. It was recommended she present to the ED from GI clinic. She denies fever, chills, CP, SOB, HA.    ED: CBC WNL, LA WNL, UA+, troponin and EKG negative, KUB negative.

## 2018-11-16 NOTE — NURSING
Patient admitted to OB5. Patient awake and alert. IV fluids infusing. Patient oriented x 4. IV patent, dressing clean, dry and intact. Received report from [facility] Keenan. VSS. Bladder scan done per PA request. Patient in & out done 350ml results.

## 2018-11-16 NOTE — ASSESSMENT & PLAN NOTE
UTI  - was on keflex daily for 3 months as PPX  - UA from 11/14 with GNR, follow sensitivities   - start empiric daily rocephin  - 0/4 SIRS

## 2018-11-16 NOTE — PROGRESS NOTES
Pt's blood glucose is 202. Pt stated she had eaten a snack just before her dinner came causing her BG to be elevated. Pt refused PRN insulin. Nurse informed pt we would recheck BG at bedtime.

## 2018-11-16 NOTE — PLAN OF CARE
CT and US reviewed in detail. No significant SMA stenosis to explain abdominal symptoms. Pain not consistent with chronic mesenteric ischemia as it has been occurring both fasting and post-prandial.    Full note pending.    Tati Ohara MD FACS [facility]  Vascular & Endovascular Surgery

## 2018-11-16 NOTE — ASSESSMENT & PLAN NOTE
- seen by GI in clinic today with extensive recommendations, very much appreciated  - will implement the following and consult GI if abdominal pain remains  - increase PPI to BID, trial of rifaximin for ?SIBO given colon resection and multiple abx for UTIs  - possibly urologic etiology as well, see hydronephrosis   - CLD, ADAT

## 2018-11-16 NOTE — NURSING TRANSFER
Nursing Transfer Note      11/16/2018     Transfer To: obs 5    Transfer via stretcher    Transported by PCT    Medicines sent: n/a    Chart send with patient: Yes    Notified: daughter    Patient reassessed at: 11/16/18 at 1402

## 2018-11-16 NOTE — PLAN OF CARE
Problem: Patient Care Overview  Goal: Plan of Care Review  Outcome: Ongoing (interventions implemented as appropriate)  Patient NPO. Waiting on consult Vascular surgery. VSS. NA infusing.

## 2018-11-16 NOTE — DISCHARGE INSTRUCTIONS

## 2018-11-16 NOTE — ANESTHESIA PREPROCEDURE EVALUATION
11/16/2018  Anayeli Judd is a 73 y.o., female with a history of CRC, multiple admissions for SBO, IBS, microscopic colitis, HLD, T2DM, GERD, Hypothyroidism, who is presenting for post-hospitalization follow-up of abdominal pain.      Pre-operative evaluation for Procedure(s) (LRB):  EGD (ESOPHAGOGASTRODUODENOSCOPY) (N/A)    Anayeli Judd is a 73 y.o. female     Patient Active Problem List   Diagnosis    Lumbar spondylosis    Cervical spondylosis with radiculopathy    Insomnia    Carpal tunnel syndrome    Headache(784.0)    Hypothyroid    History of colon cancer    Irritable bowel syndrome    Chronic sinusitis of right maxilla    Diarrhea    Pelvic muscle wasting    GERD (gastroesophageal reflux disease)    Fatty liver    Partial small bowel obstruction    Family history of breast cancer    Numbness of face    Gait disturbance    Type 2 diabetes mellitus without complication, without long-term current use of insulin    Acute deep vein thrombosis (DVT) of upper extremity    Wound infection after surgery    Hematoma    Chronic diarrhea    Fecal incontinence    Abdominal pain    Nausea    Vitamin B12 deficiency    Vaginal atrophy    Incomplete emptying of bladder    Mixed stress and urge urinary incontinence    Vaginal irritation    Frequent UTI    Fecal soiling    Irregular bowel habits    Elevated serum creatinine    Hypomagnesemia    Hyponatremia    Chronic kidney disease, stage III (moderate)    Anemia    Hydronephrosis    Superior mesenteric artery stenosis    Acute cystitis without hematuria       Review of patient's allergies indicates:   Allergen Reactions    Percocet  [oxycodone-acetaminophen]      Other reaction(s): Itching    Sulfa (sulfonamide antibiotics)      Other reaction(s): Nausea  Other reaction(s): Itching    Adhesive Rash       No current  "facility-administered medications on file prior to encounter.      Current Outpatient Medications on File Prior to Encounter   Medication Sig Dispense Refill    atorvastatin (LIPITOR) 40 MG tablet Take 1 tablet (40 mg total) by mouth once daily. 90 tablet 1    betamethasone dipropionate (DIPROLENE) 0.05 % ointment Apply every Am x 2 weeks then twice weekly 45 g 3    blood sugar diagnostic Strp Dispense Jobinasecond contour strips; test blood sugar once daily 100 strip 11    blood-glucose meter (CONTOUR METER) kit Please dispense [place] Contour meter and supplies Use as instructed Test Blood sugar once daily 1 each 0    conjugated estrogens (PREMARIN) vaginal cream Place 0.5 g vaginally every evening. 30 g 11    diphenhydrAMINE (BENADRYL) 25 mg capsule Take 1 each (25 mg total) by mouth nightly as needed for Itching, Allergies or Insomnia.  0    fluticasone (FLONASE) 50 mcg/actuation nasal spray 2 sprays by Each Nare route once daily.  0    FREESTYLE EFREN READER Misc TEST as directed  0    FREESTYLE EFREN SENSOR Kit   0    gabapentin (NEURONTIN) 600 MG tablet Take 1,200 mg by mouth 2 (two) times daily.       hydrocortisone 2.5 % cream   0    insulin glargine (LANTUS SOLOSTAR U-100 INSULIN) 100 unit/mL (3 mL) InPn pen Inject 10 Units into the skin every evening. (Patient taking differently: Inject 12 Units into the skin every evening. ) 15 mL 3    insulin syringe-needle U-100 0.3 mL 31 gauge x 15/64" Syrg 1 Syringe by Misc.(Non-Drug; Combo Route) route once daily. 30 Syringe 3    ipratropium (ATROVENT) 0.06 % nasal spray   0    lancets (ONE TOUCH ULTRASOFT LANCETS) Misc Test blood sugar once daily 200 each 11    levothyroxine (SYNTHROID) 100 MCG tablet TAKE 1 TABLET BY MOUTH EVERY MORNING 90 tablet 0    linagliptin (TRADJENTA) 5 mg Tab tablet Take 1 tablet (5 mg total) by mouth once daily. 90 tablet 3    magnesium 30 mg Tab Take 500 capsules by mouth. Patient's taking magnesium 500mg QD      meloxicam " (MOBIC) 15 MG tablet TAKE 1 TABLET BY MOUTH DAILY WITH FOOD AS NEEDED FOR PAIN OR ARTHRITIS 90 tablet 0    metFORMIN (GLUCOPHAGE-XR) 750 MG 24 hr tablet Take 1 tablet (750 mg total) by mouth 2 (two) times daily with meals. 60 tablet 11    metronidazole 0.75% (METROCREAM) 0.75 % Crea   0    mirabegron 50 mg Tb24 Take 1 tablet (50 mg total) by mouth once daily. 30 tablet 11    ondansetron (ZOFRAN) 4 MG tablet Take 1 tablet (4 mg total) by mouth every 8 (eight) hours as needed for Nausea. 12 tablet 0    ospemifene (OSPHENA) 60 mg Tab Take 60 mg by mouth once daily. 90 tablet 1    pantoprazole (PROTONIX) 40 MG tablet TAKE 1 TABLET BY MOUTH ONCE DAILY 90 tablet 1    polyethylene glycol (GLYCOLAX) 17 gram/dose powder Take 17 g by mouth once daily.  0    triamcinolone acetonide 0.1% (KENALOG) 0.1 % paste apply to affected area with A Q-TIP three times a day  0    VIT A/VIT C/VIT E/ZINC/COPPER (ICAPS AREDS ORAL) Take by mouth 2 (two) times daily.      vitamin D 1000 units Tab Take 185 mg by mouth once daily. D3      azelastine (ASTELIN) 137 mcg nasal spray 1 spray (137 mcg total) by Nasal route 2 (two) times daily. 30 mL 1       Past Surgical History:   Procedure Laterality Date    APPENDECTOMY      BACK SURGERY      CARPAL TUNNEL RELEASE      bilateral      SECTION      CHOLECYSTECTOMY  1965    COLECTOMY  2011    Transverse colon resection by Dr. Aguirre    COLONOSCOPY N/A 2017    Procedure: COLONOSCOPY;  Surgeon: Manjit Alvarez MD;  [facility]  Service: Endoscopy;  Laterality: N/A;    COLONOSCOPY N/A 2017    Performed by [facility]jit [facility] at [facility] (4TH FLR)    COLONOSCOPY N/A 2013    Performed by Angelo Reynolds MD at [facility] (4TH FLR)    EXPLORATORY-LAPAROTOMY N/A 2017    Per[facility] at [facility]    EXPLORATORY-LAPAROTOMY N/A 2014    Performed by Gonzalez Silvestre [facility] at [facility]ND [facility]LR    EYE SURGERY       Cataract Removal    HYSTERECTOMY      LYSIS-ADHESION N/A 4/1/2017    [facility]    LYSIS-ADHESION N/A 9/2/2014    [facility]    PLACEMENT  4/1/2017    [facility]    posterolateral fusion with autograft bone and [place] mineralized bone matrix  2/1/13    at [facility] for lumbar spine stenosis    REPAIR  4/1/2017    [facility]    RESECTION - SMALL BOWEL N/A 9/2/2014    [facility]    TOE SURGERY      TONSILLECTOMY      TRIGGER FINGER RELEASE         Social History     Socioeconomic History    Marital status:      Spouse name: Not on file    Number of children: Not on file    Years of education: Not on file    Highest education level: Not on file   Social Needs    Financial resource strain: Not on file    Food insecurity - worry: Not on file    Food insecurity - inability: Not on file    Transportation needs - medical: Not on file    Transportation needs - non-medical: Not on file   Occupational History    Not on file   Tobacco Use    Smoking status: Never Smoker    Smokeless tobacco: Never Used   Substance and Sexual Activity    Alcohol use: Yes     Alcohol/week: 3.0 oz     Types: 5 Glasses of wine per week     Comment: socially    Drug use: No    Sexual activity: No     Birth control/protection: Abstinence   Other Topics Concern    Are you pregnant or think you may be? Not Asked    Breast-feeding Not Asked   Social History Narrative    , lives alone.  Primary support are her children and friends.         CBC:   Recent Labs     11/15/18  1530 11/16/18  0409   WBC 6.63 4.26   RBC 4.03 3.57*   HGB 10.6* 9.5*   HCT 33.3* 30.2*   * 95*   MCV 83 85   MCH 26.3* 26.6*   MCHC 31.8* 31.5*       CMP:   Recent Labs     11/14/18  1546 11/15/18  1530 11/16/18  0409   NA  138 135* 142   K 4.2 3.9 4.1    102 109   CO2 16* 22* 26   BUN 11 11 9   CREATININE 0.9 1.0 0.8   GLU 66* 77 116*   MG  --  1.4* 1.7   PHOS  --  3.3 4.2   CALCIUM 9.7 9.4 8.7   ALBUMIN 3.9 3.8  --    PROT 7.7 7.3  --    ALKPHOS 51* 51*  --    ALT 25 25  --    AST 32 33  --    BILITOT 0.8 0.7  --          EKD Echo:  No results found for this or any previous visit.      Anesthesia Evaluation    I have reviewed the Patient Summary Reports.    I have reviewed the Nursing Notes.      Review of Systems  Anesthesia Hx:  No problems with previous Anesthesia  History of prior surgery of interest to airway management or planning: Denies Family Hx of Anesthesia complications.    Social:  No Alcohol Use    Cardiovascular:   Exercise tolerance: good  Denies Angina.    Pulmonary:  Pulmonary Normal    Renal/:   Chronic Renal Disease    Hepatic/GI:   GERD, well controlled    Musculoskeletal:   Arthritis     Neurological:   Headaches    Endocrine:   Diabetes, well controlled Hypothyroidism        Physical Exam  General:  Well nourished    Airway/Jaw/Neck:  Airway Findings: Mouth Opening: Normal Tongue: Normal  General Airway Assessment: Adult  Mallampati: II  TM Distance: 4 - 6 cm      Dental:  Dental Findings:    Chest/Lungs:  Chest/Lungs Findings: Clear to auscultation, Normal Respiratory Rate     Heart/Vascular:  Heart Findings: Rate: Normal  Rhythm: Regular Rhythm        Mental Status:  Mental Status Findings:  Cooperative, Alert and Oriented         Anesthesia Plan  Type of Anesthesia, risks & benefits discussed:  Anesthesia Type:  general  Patient's Preference:   Intra-op Monitoring Plan: standard ASA monitors  Intra-op Monitoring Plan Comments:   Post Op Pain Control Plan: IV/PO Opioids PRN  Post Op Pain Control Plan Comments:   Induction:   IV  Beta Blocker:  Patient is not currently on a Beta-Blocker (No further documentation required).       Informed Consent: Patient understands risks and agrees with  Anesthesia plan.  Questions answered. Anesthesia consent signed with patient.  ASA Score: 3     Day of Surgery Review of History & Physical:    H&P update referred to the surgeon.         Ready For Surgery From Anesthesia Perspective.

## 2018-11-16 NOTE — ASSESSMENT & PLAN NOTE
- hold oral hypoglycemic agents  - home insulin: detemir 12U nightly,   - hospital insulin: detemir 10 U nightly, low dose SSI  - A1c

## 2018-11-16 NOTE — CONSULTS
Anayeli Ellisxavi  2018    Vascular Surgery Consult Note    CC: abdominal pain    HPI:  Patient is a 73 y.o. female with CRC, multiple admissions for SBO, IBS, microscopic colitis, HLD, T2DM, GERD, Hypothyroidism,  incompelte bladder emptying, frequent UTIs, who is presenting for evaluation of abdominal pain. She reports off and on abdominal pain for the last 2 weeks, usually worse 30 min - 1 hour after eating. Pain is epigastric that radiates to suprapubic, it is sharp and extreme. It spontaneously resolves with time. She was seen in the ED  for these complaints and d/c'd home after negative work up including CT A/P which showed hydronephrosis and a LA of 2.5.     She then presented to GI clinic the next day with worsening of abdominal pain. Associated with nausea, no emesis. She will have normal BM in the AM, followed by diarrhea in the day, no blood. She recently completed a 3 month course of keflex for chronic UTIs and she denies any urinary symptoms and reports frequency, but not dysuria. It was recommended she present to the ED from GI clinic.    Patient admitted for observation. Mesenteric ultrasound showed PSV of 256cm/s and Ao/SMA ratio >3. We are consulted to r/o ischemic colitis.         Past Medical History:   Diagnosis Date    Allergic rhinitis     Arthritis     Blood transfusion     during delivery and     Bowel obstruction     Cervical radiculopathy     followed by dr cloud    Colon cancer     transverse colon; resected; Stage IIA (pT3 pN0 MX)    Diarrhea     Family history of breast cancer     Family history of colon cancer     Fatty liver     GERD (gastroesophageal reflux disease)     History of shingles     Hyperlipidemia     Hypothyroidism     Irritable bowel syndrome     Microscopic colitis     treated     Raynaud phenomenon     Raynaud's disease     Type 2 diabetes mellitus      Past Surgical History:   Procedure Laterality Date    APPENDECTOMY       BACK SURGERY      CARPAL TUNNEL RELEASE      bilateral      SECTION      CHOLECYSTECTOMY  1965    COLECTOMY  2011    Transverse colon resection by Dr. Aguirre    COLONOSCOPY N/A 2017    Procedure: COLONOSCOPY;  Surgeon: Manjit Alvarez MD;  Location: [facility] (4TH FLR);  Service: Endoscopy;  Laterality: N/A;    COLONOSCOPY N/A 2017    Performed by [facility] at [facility] ([facility])    COLONOSCOPY N/A 2013    Performed by Angelo Reynolds [facility] at [facility] (4TH FLR)    EXPLORATORY-LAPAROTOMY N/A 2017    [facility]formed by [facility] at [facility] 2[facility] FLR    EXPLORATORY-LAPAROTOMY N/A 2014    [facility] by [facility] at [facility]LR    EYE SURGERY      Cataract Removal    HYSTERECTOMY      LYSIS-ADHESION N/A 2017    [facility]formed by [facility] at [facility]    LYSIS-ADHESION N/A 2014    [facility] by [facility] at [facility]    PLACEMENT  2017    Performed by Herman [facility] at [facility]    posterolateral fusion with autograft bone and [place] mineralized bone matrix  13    at [facility] for lumbar spine stenosis    REPAIR  2017    Performed by [facility] at [facility]LR    RESECTION - SMALL BOWEL N/A 2014    [facility] by [facility] at [facility]LR    TOE SURGERY      TONSILLECTOMY      TRIGGER FINGER RELEASE       Family History   Problem Relation Age of Onset    Heart disease Father 50        Mi age 50    Colon cancer Father     Bladder Cancer Mother         non smoker    Cataracts Mother     Glaucoma Mother     Heart disease Mother     Hyperlipidemia Mother     Kidney disease Mother     Breast cancer Sister 79    Arthritis Daughter     Asthma Daughter     Depression Daughter     Hypertension Daughter     Stroke Daughter 40    Arthritis Brother     Colon cancer Brother 70    Alcohol abuse Brother     Parkinsonism Brother      Alcohol abuse Brother     Depression Daughter     Celiac disease Neg Hx     Cirrhosis Neg Hx     Colon polyps Neg Hx     Crohn's disease Neg Hx     Cystic fibrosis Neg Hx     Esophageal cancer Neg Hx     Hemochromatosis Neg Hx     Inflammatory bowel disease Neg Hx     Irritable bowel syndrome Neg Hx     Liver cancer Neg Hx     Liver disease Neg Hx     Rectal cancer Neg Hx     Stomach cancer Neg Hx     Ulcerative colitis Neg Hx     Eliazar's disease Neg Hx     Amblyopia Neg Hx     Blindness Neg Hx     Macular degeneration Neg Hx     Retinal detachment Neg Hx     Strabismus Neg Hx     Melanoma Neg Hx      Social History     Socioeconomic History    Marital status:      Spouse name: Not on file    Number of children: Not on file    Years of education: Not on file    Highest education level: Not on file   Social Needs    Financial resource strain: Not on file    Food insecurity - worry: Not on file    Food insecurity - inability: Not on file    Transportation needs - medical: Not on file    Transportation needs - non-medical: Not on file   Occupational History    Not on file   Tobacco Use    Smoking status: Never Smoker    Smokeless tobacco: Never Used   Substance and Sexual Activity    Alcohol use: Yes     Alcohol/week: 3.0 oz     Types: 5 Glasses of wine per week     Comment: socially    Drug use: No    Sexual activity: No     Birth control/protection: Abstinence   Other Topics Concern    Are you pregnant or think you may be? Not Asked    Breast-feeding Not Asked   Social History Narrative    , lives alone.  Primary support are her children and friends.     Review of patient's allergies indicates:   Allergen Reactions    Percocet  [oxycodone-acetaminophen]      Other reaction(s): Itching    Sulfa (sulfonamide antibiotics)      Other reaction(s): Nausea  Other reaction(s): Itching    Adhesive Rash       Current Facility-Administered Medications:     0.9%  NaCl  infusion, , Intravenous, Continuous, Camilo Burgos PA-C, Last Rate: 100 mL/hr at 11/15/18 2333    acetaminophen oral solution 650 mg, 650 mg, Oral, Q4H PRN, Camilo Burgos PA-C    albuterol-ipratropium 2.5 mg-0.5 mg/3 mL nebulizer solution 3 mL, 3 mL, Nebulization, Q4H PRN, Camilo Burgos PA-C    aspirin chewable tablet 81 mg, 81 mg, Oral, Daily, Camilo Burgos PA-C    atorvastatin tablet 40 mg, 40 mg, Oral, Daily, Camilo Burgos PA-C    bisacodyl suppository 10 mg, 10 mg, Rectal, Daily PRN, Camilo Burgos PA-C    cefTRIAXone injection 1 g, 1 g, Intravenous, Q24H, Camilo Burgos PA-C    dextrose 50% injection 12.5 g, 12.5 g, Intravenous, PRN, Camilo Burgos PA-C    dextrose 50% injection 25 g, 25 g, Intravenous, PRN, Camilo Burgos PA-C    gabapentin capsule 1,200 mg, 1,200 mg, Oral, BID, Camilo Burgos PA-C, 1,200 mg at 11/15/18 2016    glucagon (human recombinant) injection 1 mg, 1 mg, Intramuscular, PRN, Camilo Burgos PA-C    glucose chewable tablet 16 g, 16 g, Oral, PRN, Camilo Burgos PA-C    glucose chewable tablet 24 g, 24 g, Oral, PRN, Camilo Burgos PA-C    insulin aspart U-100 pen 0-5 Units, 0-5 Units, Subcutaneous, QID (AC + HS) PRN, Camilo Burgos PA-C    insulin detemir U-100 pen 10 Units, 10 Units, Subcutaneous, QHS, Camilo Burgos PA-C, Stopped at 11/15/18 2100    levothyroxine tablet 100 mcg, 100 mcg, Oral, QAM, Cmailo Burgos PA-C    ondansetron disintegrating tablet 8 mg, 8 mg, Oral, Q8H PRN, Camilo Burgos PA-C    pantoprazole EC tablet 40 mg, 40 mg, Oral, BID, Camilo Burgos PA-C, 40 mg at 11/15/18 2019    polyethylene glycol packet 17 g, 17 g, Oral, Daily, Camilo Burgos PA-C    promethazine (PHENERGAN) 6.25 mg in dextrose 5 % 50 mL IVPB, 6.25 mg, Intravenous, Q6H PRN, Camilo Burgos PA-C    psyllium husk (aspartame) 3.4 gram packet 1 packet, 1 packet, Oral, Daily, Camilo Burgos PA-C    ramelteon tablet 8 mg, 8 mg, Oral, Nightly PRN, Camilo Burgos PA-C, 8 mg at 11/15/18  2353    rifAXImin tablet 200 mg, 200 mg, Oral, BID, Camilo Burgos PA-C, 200 mg at 11/15/18 6417    sodium chloride 0.9% flush 5 mL, 5 mL, Intravenous, PRN, Camilo Burgos PA-C    REVIEW OF SYSTEMS:  General: negative;   ENT: negative;   Allergy and Immunology: negative;   Hematological and Lymphatic: Negative;   Endocrine: negative;   Respiratory: no cough, shortness of breath, or wheezing;   Cardiovascular: no chest pain or dyspnea on exertion;   Gastrointestinal: chronic intermittent abdominal pain;   Genito-Urinary: urinary retension;   Musculoskeletal: negative  Neurological: no TIA or stroke symptoms;   Psychiatric: no nervousness, anxiety or depression.    PHYSICAL EXAM:      Pulse: (!) 57  Temp: 97.6 °F (36.4 °C)      General appearance:  Alert, well-appearing, and in no distress.  Oriented to person, place, and time   Neurological:  Normal speech, no focal findings noted; CN II - XII grossly intact           Musculoskeletal: Digits/nail without cyanosis/clubbing.  Normal muscle strength/tone.                 Neck: Supple, no significant adenopathy; thyroid is not enlarged                Chest:  Clear to auscultation, no wheezes, rales or rhonchi, symmetric air entry      No use of accessory muscles             Cardiac: Normal rate and regular rhythm, S1 and S2 normal; PMI non-displaced          Abdomen: Soft, nontender, nondistended, no masses or organomegaly      No rebound tenderness noted; bowel sounds normal      Extremities:   2+ femoral pulses bilaterally      2+ DP bilaterally          LAB RESULTS:  Lab Results   Component Value Date    K 4.1 11/16/2018    K 3.9 11/15/2018    K 4.2 11/14/2018    CREATININE 0.8 11/16/2018    CREATININE 1.0 11/15/2018    CREATININE 0.9 11/14/2018     Lab Results   Component Value Date    WBC 4.26 11/16/2018    WBC 6.63 11/15/2018    WBC 7.25 11/14/2018    HCT 30.2 (L) 11/16/2018    HCT 33.3 (L) 11/15/2018    HCT 41.2 11/14/2018    PLT 95 (L) 11/16/2018     (L)  11/15/2018     (L) 11/14/2018     Lab Results   Component Value Date    HGBA1C 5.7 (H) 11/16/2018    HGBA1C 5.9 (H) 11/12/2018    HGBA1C 7.5 (H) 08/17/2018       IMAGING:  CT abd/pelv with contrast - no stenosis of mesenteric vessels, widely patent Celiac/SMA/and BRAYDEN. Patent bilateral hypogastric arteries    US mesenteric  FINDINGS:  Aorta: Normal in caliber measuring; 2.2 x 2.3 cm proximally, 1.4 x 1.8 cm mid, and 2.4 x 2 5 cm distally.  Moderate atherosclerotic calcification.    Iliac Arteries: Normal in caliber; right iliac measures 0.8 x 0.8 cm and left iliac measures 0.9 x 0.9 cm.    Celiac Artery: Systolic peak velocity 191 cm/sec on expiration and 126 cm/sec on inspiration.  Celiac to aortic ratio of 2.48.    Superior Mesenteric Artery: Peak systolic velocity measuring 256 cm/sec, SMA to aortic velocity ratio of 3.32.    Inferior Mesenteric Artery: Obscured by overlying bowel gas.      IMP/PLAN:  73 y.o. female with CRC, multiple admissions for SBO, IBS, microscopic colitis, HLD, T2DM, GERD, Hypothyroidism,  incompelte bladder emptying, frequent UTIs, who is presenting for evaluation of abdominal pain.    Based on recent CT and mesenteric US, abdominal pain is unlikely from ischemic colitis. All vessels are widely patent.   con't conservative w/u with CRS and GI for other causes of abdominal pain.   Thank you for this consult, Vascular surgery will sign off.     Asad Correa MD  Vascular/Endovascular Surgery Fellow

## 2018-11-16 NOTE — ASSESSMENT & PLAN NOTE
- home mirabegron not on formulary, patient may take home med if brings it in  - PRN bladder scan and cath

## 2018-11-16 NOTE — SUBJECTIVE & OBJECTIVE
Past Medical History:   Diagnosis Date    Allergic rhinitis     Arthritis     Blood transfusion     during delivery and     Bowel obstruction     Cervical radiculopathy     followed by dr reynolds    Colon cancer     transverse colon; resected; Stage IIA (pT3 pN0 MX)    Diarrhea     Family history of breast cancer     Family history of colon cancer     Fatty liver     GERD (gastroesophageal reflux disease)     History of shingles     Hyperlipidemia     Hypothyroidism     Irritable bowel syndrome     Microscopic colitis     treated     Raynaud phenomenon     Raynaud's disease     Type 2 diabetes mellitus        Past Surgical History:   Procedure Laterality Date    APPENDECTOMY      BACK SURGERY      CARPAL TUNNEL RELEASE      bilateral      SECTION      CHOLECYSTECTOMY  1965    COLECTOMY  2011    Transverse colon resection by Dr. Aguirre    COLONOSCOPY N/A 2017    Procedure: COLONOSCOPY;  Surgeon: Manjit Alvarez MD;  Location: [facility] (4TH FLR);  Service: Endoscopy;  Laterality: N/A;    COLONOSCOPY N/A 2017    Performed by [facility]jit [facility]varez [facility] at [facility] (4TH FLR)    COLONOSCOPY N/A 2013    Performed by Angelo Reynolds MD at [facility] ENDO (4TH FLR)    EXPLORATORY-LAPAROTOMY N/A 2017    Performed by Herman Fletcher MD at [facility] OR 2ND FLR    EXPLORATORY-LAPAROTOMY N/A 2014    Performed by Gonzalez Silvestre MD at [facility] OR 2ND FLR    EYE SURGERY      Cataract Removal    HYSTERECTOMY      LYSIS-ADHESION N/A 2017    Performed by Herman Fletcher [facility] at [facility] OR 2ND FLR    LYSIS-ADHESION N/A 2014    Performed by [facility]re [facility] at [facility] OR 2ND FLR    PLACEMENT  2017    Performed by eHrman Fletcher MD at [facility] OR 2ND FLR    posterolateral fusion with autograft bone and [place] mineralized bone matrix  13    at [facility] for lumbar spine stenosis    REPAIR  2017    Performed by Herman Fletcher [facility] at [facility] OR  "2ND FLR    RESECTION - SMALL BOWEL N/A 9/2/2014    [facility]    TOE SURGERY      TONSILLECTOMY      TRIGGER FINGER RELEASE         Review of patient's allergies indicates:   Allergen Reactions    Percocet  [oxycodone-acetaminophen]      Other reaction(s): Itching    Sulfa (sulfonamide antibiotics)      Other reaction(s): Nausea  Other reaction(s): Itching    Adhesive Rash       No current facility-administered medications on file prior to encounter.      Current Outpatient Medications on File Prior to Encounter   Medication Sig    atorvastatin (LIPITOR) 40 MG tablet Take 1 tablet (40 mg total) by mouth once daily.    betamethasone dipropionate (DIPROLENE) 0.05 % ointment Apply every Am x 2 weeks then twice weekly    blood sugar diagnostic Strp Dispense [address] contour strips; test blood sugar once daily    blood-glucose meter (CONTOUR METER) kit Please dispense [employer] Contour meter and supplies Use as instructed Test Blood sugar once daily    conjugated estrogens (PREMARIN) vaginal cream Place 0.5 g vaginally every evening.    diphenhydrAMINE (BENADRYL) 25 mg capsule Take 1 each (25 mg total) by mouth nightly as needed for Itching, Allergies or Insomnia.    fluticasone (FLONASE) 50 mcg/actuation nasal spray 2 sprays by Each Nare route once daily.    FREESTYLE EFREN READER [facility] TEST as directed    FREESTYLE EFREN SENSOR Kit     gabapentin (NEURONTIN) 600 MG tablet Take 1,200 mg by mouth 2 (two) times daily.     hydrocortisone 2.5 % cream     insulin glargine (LANTUS SOLOSTAR U-100 INSULIN) 100 unit/mL (3 mL) InPn pen Inject 10 Units into the skin every evening. (Patient taking differently: Inject 12 Units into the skin every evening. )    insulin syringe-needle U-100 0.3 mL 31 gauge x 15/64" Syrg 1 Syringe by Misc.(Non-Drug; Combo Route) route once daily.    ipratropium (ATROVENT) 0.06 % nasal spray     lancets (ONE TOUCH ULTRASOFT LANCETS) Misc Test " blood sugar once daily    levothyroxine (SYNTHROID) 100 MCG tablet TAKE 1 TABLET BY MOUTH EVERY MORNING    linagliptin (TRADJENTA) 5 mg Tab tablet Take 1 tablet (5 mg total) by mouth once daily.    magnesium 30 mg Tab Take 500 capsules by mouth. Patient's taking magnesium 500mg QD    meloxicam (MOBIC) 15 MG tablet TAKE 1 TABLET BY MOUTH DAILY WITH FOOD AS NEEDED FOR PAIN OR ARTHRITIS    metFORMIN (GLUCOPHAGE-XR) 750 MG 24 hr tablet Take 1 tablet (750 mg total) by mouth 2 (two) times daily with meals.    metronidazole 0.75% (METROCREAM) 0.75 % Crea     mirabegron 50 mg Tb24 Take 1 tablet (50 mg total) by mouth once daily.    ondansetron (ZOFRAN) 4 MG tablet Take 1 tablet (4 mg total) by mouth every 8 (eight) hours as needed for Nausea.    ospemifene (OSPHENA) 60 mg Tab Take 60 mg by mouth once daily.    pantoprazole (PROTONIX) 40 MG tablet TAKE 1 TABLET BY MOUTH ONCE DAILY    polyethylene glycol (GLYCOLAX) 17 gram/dose powder Take 17 g by mouth once daily.    triamcinolone acetonide 0.1% (KENALOG) 0.1 % paste apply to affected area with A Q-TIP three times a day    VIT A/VIT C/VIT E/ZINC/COPPER (ICAPS AREDS ORAL) Take by mouth 2 (two) times daily.    vitamin D 1000 units Tab Take 185 mg by mouth once daily. D3    azelastine (ASTELIN) 137 mcg nasal spray 1 spray (137 mcg total) by Nasal route 2 (two) times daily.     Family History     Problem Relation (Age of Onset)    Alcohol abuse Brother, Brother    Arthritis Daughter, Brother    Asthma Daughter    Bladder Cancer Mother    Breast cancer Sister (79)    Cataracts Mother    Colon cancer Father, Brother (70)    Depression Daughter, Daughter    Glaucoma Mother    Heart disease Father (50), Mother    Hyperlipidemia Mother    Hypertension Daughter    Kidney disease Mother    Parkinsonism Brother    Stroke Daughter (40)        Tobacco Use    Smoking status: Never Smoker    Smokeless tobacco: Never Used   Substance and Sexual Activity    Alcohol use: Yes      Alcohol/week: 3.0 oz     Types: 5 Glasses of wine per week     Comment: socially    Drug use: No    Sexual activity: No     Birth control/protection: Abstinence     Review of Systems   Constitutional: Positive for appetite change and fatigue. Negative for chills, fever and unexpected weight change.   Respiratory: Negative for cough, shortness of breath and wheezing.    Cardiovascular: Negative for chest pain, palpitations and leg swelling.   Gastrointestinal: Positive for abdominal pain, diarrhea and nausea. Negative for abdominal distention, anal bleeding, blood in stool and vomiting.   Genitourinary: Positive for difficulty urinating (chronic). Negative for dysuria, flank pain and vaginal discharge.   Musculoskeletal: Negative for back pain and gait problem.   Skin: Negative for rash and wound.   Neurological: Negative for dizziness, weakness and headaches.   Psychiatric/Behavioral: Negative for agitation and confusion.     Objective:     Vital Signs (Most Recent):  Temp: 97.5 °F (36.4 °C) (11/15/18 2333)  Pulse: (!) 59 (11/15/18 2333)  Resp: 18 (11/15/18 2333)  BP: (!) 125/58 (11/15/18 2333)  SpO2: 99 % (11/15/18 2333) Vital Signs (24h Range):  Temp:  [97.3 °F (36.3 °C)-97.7 °F (36.5 °C)] 97.5 °F (36.4 °C)  Pulse:  [59-75] 59  Resp:  [16-18] 18  SpO2:  [98 %-100 %] 99 %  BP: (106-145)/(55-68) 125/58     Weight: 65.8 kg (145 lb)  Body mass index is 26.52 kg/m².    Physical Exam   Constitutional: She is oriented to person, place, and time. She appears well-developed and well-nourished. No distress.   HENT:   Head: Normocephalic and atraumatic.   Eyes: EOM are normal.   Neck: Normal range of motion. Neck supple.   Cardiovascular: Normal rate and regular rhythm.   No murmur heard.  Pulmonary/Chest: Effort normal and breath sounds normal. No respiratory distress.   Abdominal: Soft. Bowel sounds are normal. She exhibits no distension. There is tenderness (epigastic). There is no rebound.   Musculoskeletal: Normal  range of motion. She exhibits no edema.   Neurological: She is alert and oriented to person, place, and time. No cranial nerve deficit.   Skin: Skin is warm and dry. She is not diaphoretic.   Psychiatric: She has a normal mood and affect. Her behavior is normal. Judgment and thought content normal.   Nursing note and vitals reviewed.        CRANIAL NERVES     CN III, IV, VI   Extraocular motions are normal.        Significant Labs:   BMP:   Recent Labs   Lab 11/15/18  1530   GLU 77   *   K 3.9      CO2 22*   BUN 11   CREATININE 1.0   CALCIUM 9.4   MG 1.4*     CBC:   Recent Labs   Lab 11/14/18  1546 11/15/18  1530   WBC 7.25 6.63   HGB 11.6* 10.6*   HCT 41.2 33.3*   * 119*     CMP:   Recent Labs   Lab 11/14/18  1546 11/15/18  1530    135*   K 4.2 3.9    102   CO2 16* 22*   GLU 66* 77   BUN 11 11   CREATININE 0.9 1.0   CALCIUM 9.7 9.4   PROT 7.7 7.3   ALBUMIN 3.9 3.8   BILITOT 0.8 0.7   ALKPHOS 51* 51*   AST 32 33   ALT 25 25   ANIONGAP 15 11   EGFRNONAA >60.0 55.2*     Cardiac Markers:   Recent Labs   Lab 11/15/18  1530   *     Troponin:   Recent Labs   Lab 11/15/18  1530   TROPONINI <0.006     TSH:   Recent Labs   Lab 11/12/18  0801   TSH 0.832     Urine Culture:   Recent Labs   Lab 11/14/18  1546   LABURIN GRAM NEGATIVE TENZIN  >100,000 cfu/ml  Identification and susceptibility pending       Urine Studies:   Recent Labs   Lab 11/15/18  1549   COLORU Yellow   APPEARANCEUA Hazy*   PHUR 5.0   SPECGRAV 1.010   PROTEINUA Negative   GLUCUA Negative   KETONESU Negative   BILIRUBINUA Negative   OCCULTUA Negative   NITRITE Negative   LEUKOCYTESUR 3+*   RBCUA 1   WBCUA 70*   BACTERIA Few*   SQUAMEPITHEL 0   HYALINECASTS 1       Significant Imaging: I have reviewed all pertinent imaging results/findings within the past 24 hours.

## 2018-11-16 NOTE — PLAN OF CARE
CM met with patient for discharge planning.  Patient states she lives in a condo alone on the 7th floor with an elevator to bring her up to her condo.  She is very independent in her activity.  Plan is to discharge home with no needs identified at this time.    Pharmacy:    [id number] - [address] [address]  Phone: [phone] Fax: [phone]    PCP:  Rocio Mc MD    Payor: MEDICARE / Plan: MEDICARE PART A & B / Product Type: [facility] /      11/16/18 1102   Discharge Assessment   Assessment Type Discharge Planning Assessment   Confirmed/corrected address and phone number on facesheet? Yes   Assessment information obtained from? Patient   Expected Length of Stay (days) 3   Communicated expected length of stay with patient/caregiver yes   Prior to hospitilization cognitive status: Alert/Oriented   Prior to hospitalization functional status: Independent   Current cognitive status: Alert/Oriented   Current Functional Status: Independent   Facility Arrived From: Emergency room   Lives With alone   Able to Return to Prior Arrangements unable to determine at this time (comments)   Is patient able to care for self after discharge? Unable to determine at this time (comments)   Who are your caregiver(s) and their phone number(s)? Kimberly Horner - daughter [phone]   Patient's perception of discharge disposition home or selfcare   Readmission Within The Last 30 Days no previous admission in last 30 days   Patient currently being followed by outpatient case management? No   Patient currently receives any other outside agency services? No   Equipment Currently Used at Home walker, rolling;cane, straight;wheelchair;shower chair;bedside commode   Do you have any problems affording any of your prescribed medications? No   Is the patient taking medications as prescribed? yes   Does the patient  have transportation home? Yes   Transportation Available family or friend will provide   Does the patient receive services at the [facility]? No   Discharge Plan A Home with family   Discharge Plan B Home Health   Patient/Family In Agreement With Plan yes

## 2018-11-16 NOTE — SUBJECTIVE & OBJECTIVE
Past Medical History:   Diagnosis Date    Allergic rhinitis     Arthritis     Blood transfusion     during delivery and     Bowel obstruction     Cervical radiculopathy     followed by dr reynolds    Colon cancer     transverse colon; resected; Stage IIA (pT3 pN0 MX)    Diarrhea     Family history of breast cancer     Family history of colon cancer     Fatty liver     GERD (gastroesophageal reflux disease)     History of shingles     Hyperlipidemia     Hypothyroidism     Irritable bowel syndrome     Microscopic colitis     treated     Raynaud phenomenon     Raynaud's disease     Type 2 diabetes mellitus        Past Surgical History:   Procedure Laterality Date    APPENDECTOMY      BACK SURGERY      CARPAL TUNNEL RELEASE      bilateral      SECTION      CHOLECYSTECTOMY  1965    COLECTOMY  2011    Transverse colon resection by Dr. Aguirre    COLONOSCOPY N/A 2017    Procedure: COLONOSCOPY;  Surgeon: Manjit Alvarez MD;  Location: [facility] (4TH FLR);  Service: Endoscopy;  Laterality: N/A;    COLONOSCOPY N/A 2017    Performed by [facility] MD at [facility] ENDO (4TH FLR)    COLONOSCOPY N/A 2013    Performed by Angelo Reynolds MD at [facility] ENDO (4TH FLR)    EXPLORATORY-LAPAROTOMY N/A 2017    Performed by Herman Fletcher MD at [facility] OR 2ND FLR    EXPLORATORY-LAPAROTOMY N/A 2014    Performed by Gonzalez Sil[facility]re MD at [facility] OR 2ND FLR    EYE SURGERY      Cataract Removal    HYSTERECTOMY      LYSIS-ADHESION N/A 2017    Performed by Herman [facility] at [facility] OR 2ND FLR    LYSIS-ADHESION N/A 2014    Performed by Gonzalez [facility]re [facility] at [facility] OR 2ND FLR    PLACEMENT  2017    Performed by Herman Fletcher MD at [facility] OR 2ND FLR    posterolateral fusion with autograft bone and [place] mineralized bone matrix  13    [facility] for lumbar [facility]    REPAIR  2017    Performed by Herman [facility] at [facility] OR  2ND FLR    RESECTION - SMALL BOWEL N/A 9/2/2014    [facility]    TOE SURGERY      TONSILLECTOMY      TRIGGER FINGER RELEASE         Review of patient's allergies indicates:   Allergen Reactions    Percocet  [oxycodone-acetaminophen]      Other reaction(s): Itching    Sulfa (sulfonamide antibiotics)      Other reaction(s): Nausea  Other reaction(s): Itching    Adhesive Rash     Family History     Problem Relation (Age of Onset)    Alcohol abuse Brother, Brother    Arthritis Daughter, Brother    Asthma Daughter    Bladder Cancer Mother    Breast cancer Sister (79)    Cataracts Mother    Colon cancer Father, Brother (70)    Depression Daughter, Daughter    Glaucoma Mother    Heart disease Father (50), Mother    Hyperlipidemia Mother    Hypertension Daughter    Kidney disease Mother    Parkinsonism Brother    Stroke Daughter (40)        Tobacco Use    Smoking status: Never Smoker    Smokeless tobacco: Never Used   Substance and Sexual Activity    Alcohol use: Yes     Alcohol/week: 3.0 oz     Types: 5 Glasses of wine per week     Comment: socially    Drug use: No    Sexual activity: No     Birth control/protection: Abstinence     Review of Systems   Constitutional: Negative for appetite change, chills, diaphoresis, fatigue and unexpected weight change.   HENT: Negative for sore throat and trouble swallowing.    Eyes: Negative for visual disturbance.   Respiratory: Negative for cough and shortness of breath.    Cardiovascular: Negative for chest pain.   Gastrointestinal: Positive for abdominal pain, diarrhea and nausea. Negative for anal bleeding, blood in stool, constipation and vomiting.   Genitourinary: Negative for difficulty urinating (has a pessary in place), dysuria and hematuria.   Musculoskeletal: Negative for arthralgias and myalgias.   Skin: Negative for rash.   Neurological: Negative for dizziness and headaches.   Psychiatric/Behavioral: Negative for  agitation and confusion.     Objective:     Vital Signs (Most Recent):  Temp: 98.2 °F (36.8 °C) (11/16/18 0744)  Pulse: (!) 57 (11/16/18 0744)  Resp: 16 (11/16/18 0744)  BP: (!) 112/57 (11/16/18 0744)  SpO2: 96 % (11/16/18 0744) Vital Signs (24h Range):  Temp:  [97.3 °F (36.3 °C)-98.2 °F (36.8 °C)] 98.2 °F (36.8 °C)  Pulse:  [57-75] 57  Resp:  [16-18] 16  SpO2:  [96 %-100 %] 96 %  BP: (106-145)/(54-68) 112/57     Weight: 65.8 kg (145 lb 0 oz) (11/15/18 2135)  Body mass index is 26.52 kg/m².      Intake/Output Summary (Last 24 hours) at 11/16/2018 0825  Last data filed at 11/15/2018 2300  Gross per 24 hour   Intake --   Output 388 ml   Net -388 ml       Lines/Drains/Airways     Drain                 Gastrostomy/Enterostomy 04/01/17 1024 Gastrostomy tube w/o balloon LUQ decompression 593 days                Physical Exam   Constitutional: She is oriented to person, place, and time. She appears well-developed and well-nourished.   Well appearing in no distress. Accompanied by daughter at bedside. Speaking in full sentences, appears comfortable.   HENT:   Head: Normocephalic and atraumatic.   Mouth/Throat: No oropharyngeal exudate.   Eyes: Conjunctivae are normal. No scleral icterus.   Cardiovascular: Normal rate, regular rhythm, normal heart sounds and intact distal pulses.   Pulmonary/Chest: Effort normal and breath sounds normal. No respiratory distress.   Abdominal: Soft. Bowel sounds are normal. She exhibits no distension and no mass. There is no tenderness. There is no rebound and no guarding.   Endorses mid-epigastric pain but abdomen is soft, non-tender.   Musculoskeletal: She exhibits no edema or tenderness.   Lymphadenopathy:     She has no cervical adenopathy.   Neurological: She is alert and oriented to person, place, and time.   Skin: Skin is warm. Capillary refill takes less than 2 seconds.   Psychiatric: She has a normal mood and affect. Her behavior is normal. Judgment and thought content normal.    Nursing note and vitals reviewed.      Significant Labs:  CBC:   Recent Labs   Lab 11/14/18  1546 11/15/18  1530 11/16/18  0409   WBC 7.25 6.63 4.26   HGB 11.6* 10.6* 9.5*   HCT 41.2 33.3* 30.2*   * 119* 95*     CMP:   Recent Labs   Lab 11/15/18  1530 11/16/18  0409   GLU 77 116*   CALCIUM 9.4 8.7   ALBUMIN 3.8  --    PROT 7.3  --    * 142   K 3.9 4.1   CO2 22* 26    109   BUN 11 9   CREATININE 1.0 0.8   ALKPHOS 51*  --    ALT 25  --    AST 33  --    BILITOT 0.7  --      Coagulation: No results for input(s): PT, INR, APTT in the last 48 hours.    Significant Imaging:  Imaging results within the past 24 hours have been reviewed.

## 2018-11-16 NOTE — TREATMENT PLAN
GI Treatment Plan    Anayeli Judd is a 73 y.o. female admitted to hospital 11/15/2018 (Hospital Day: 2) due to Superior mesenteric artery stenosis.     Interval History  - EGD today unremarkable  - biopsies pending    Objective  Temp:  [97.3 °F (36.3 °C)-98.2 °F (36.8 °C)] 97.8 °F (36.6 °C) (11/16 1257)  Pulse:  [57-67] 61 (11/16 1257)  BP: (104-145)/(54-68) 109/65 (11/16 1257)  Resp:  [16-18] 16 (11/16 1257)  SpO2:  [96 %-100 %] 98 % (11/16 1257)    Laboratory    Recent Labs   Lab 11/14/18  1546 11/15/18  1530 11/16/18  0409   HGB 11.6* 10.6* 9.5*       Lab Results   Component Value Date    WBC 4.26 11/16/2018    HGB 9.5 (L) 11/16/2018    HCT 30.2 (L) 11/16/2018    MCV 85 11/16/2018    PLT 95 (L) 11/16/2018       Lab Results   Component Value Date     11/16/2018    K 4.1 11/16/2018     11/16/2018    CO2 26 11/16/2018    BUN 9 11/16/2018    CREATININE 0.8 11/16/2018    CALCIUM 8.7 11/16/2018    ANIONGAP 7 (L) 11/16/2018    ESTGFRAFRICA >60.0 11/16/2018    EGFRNONAA >60.0 11/16/2018       Lab Results   Component Value Date    ALT 25 11/15/2018    AST 33 11/15/2018    ALKPHOS 51 (L) 11/15/2018    BILITOT 0.7 11/15/2018       Lab Results   Component Value Date    INR 1.0 05/24/2018    INR 1.0 08/26/2017    INR 1.2 04/19/2017       Plan  - no signs of pathology on EGD to explain her abdominal pain. No evidence of esophagitis, gastritis or PUD  - trial of dicylomine 10mg BID x 1 month  - advance diet as tolerated  - can follow-up in GI clinic as outpatient  - Plan of care was discussed with primary team.  - We will continue to follow.    Thank you for involving us in the care of Anayeli Judd. Please call with any additional questions, concerns or changes in the patient's clinical status.    Guerrero Betts MD  Gastroenterology Fellow, PGY IV  Pager: [phone]

## 2018-11-16 NOTE — HPI
Anayeli Judd is a 73 y.o. female with a history of CRC, multiple admissions for SBO, IBS, microscopic colitis, HLD, T2DM, GERD, Hypothyroidism, who is presenting for post-hospitalization follow-up of abdominal pain.     She follows in GI clinic with Dr. Alvarez, last seen 8/28/17 for evaluation of diarrhea. She had had recently undergone (7/2017) a colonoscopy given history of microscopic colitis (lymphocytic) which was negative, and was felt that her symptoms could be IBS, post-ccy diarrhea, vs SIBO related. She was recommended for trial of cholestyramine, probiotic and consideration of trial of rifaximin.     She presents to the GI clinic today for follow-up after being seen in the ED on 11/14. Per review of ED note, she presented with cramping abdominal pain x 2 days. She endorsed constipation initially which had transitioned to diarrhea s/p laxative therapy. Ed workup including a negative lab workup with exception of lactate 2.5. CT AP was obtained due to concern for recurrent SBO which was negative for pathology with exception of mild hydronephrosis.      Today she reports that she has acute worsening of her abdominal pain. She notes that her abdominal pain has been sam-umilical with radiation to the lower abdomen/suprapubic region. She notes the pain started ~1-2 weeks ago. She notes that this morning she was awoken at ~1:30AM due to severe abdominal pain which resolved after 10 minutes. Later after eating breakfast (30 min - 1 hr) she noted the development of recurrent abdominal pain. Pain currently 8/10. Pain is associated with nausea, no vomitus. No dysphagia or odynophagia or reflux. She denies identifying any single food group which worsens her symptoms and has not had relief despite modifying her diet.     Regarding bowel habits she notes that recently she had been doing well with fiber supplementation, however over the past few weeks she has developed urgency to move bowels ~30 minutes after a meal.  She notes occasional incontinence if unable to reach the bathroom. She notes that in the AM will have a formed BM but as day progresses she will have more loose bowel movements (non-bloody/non-melenic/non-mucus). Will have 3-4BM/day currently.   On review she endorses recently completing a 3ms course of antibiotics for urinary inection. Denies any urinary symptoms. Does not feel like she has a urinary infection (UA was negative in ED, culture pending).    Since hospitalization she notes that she feels somewhat better. She notes that they did a straight cath and noted some improvement in her lower abdominal pressure but still has intermittent abdominal pain (mid-epigastric with radiation to the supra-pubic region). She notes she is hungry but has not yet tried to eat any food since hospitalization. Denies any other new complaints or changes in symptoms since clinic evaluation 11/15.     PEndoHx:  Colonoscopy. (7/27/17) Provider: Dr. Alvarez. Indication: Diarrhea. Extent: Cecum. Preparation:Adequate.  - 5mm transverse polyp  - 2x 3mm transverse polyp [tubular adenoma]  - random colonic biopsies [negative for microscopic colitis]

## 2018-11-16 NOTE — ASSESSMENT & PLAN NOTE
- incidentally seen on CT from prior ED visit  - no stones, h/o of incomplete bladder emptying   - patient reports she is urinating normally, but going frequently  - renal US continues to show hydronephrosis with distended bladder  - urology consulted  - in/out cath by RN improved some of patient's symptoms

## 2018-11-16 NOTE — PROGRESS NOTES
I was present with Guerrero Betts MD the fellow during the above evaluation, including history and exam.  I discussed the case with the fellow and agree with the findings and plan as documented in the fellow's note.

## 2018-11-16 NOTE — TRANSFER OF CARE
"Anesthesia Transfer of Care Note    Patient: Anayeli Judd    Procedure(s) Performed: Procedure(s) (LRB):  EGD (ESOPHAGOGASTRODUODENOSCOPY) (N/A)    Patient location: PACU    Anesthesia Type: general    Transport from OR: Transported from OR on 2-3 L/min O2 by NC with adequate spontaneous ventilation    Post pain: adequate analgesia    Post assessment: no apparent anesthetic complications    Post vital signs: stable    Level of consciousness: awake and alert    Nausea/Vomiting: no nausea/vomiting    Complications: none    Transfer of care protocol was followed      Last vitals:   Visit Vitals  /65 (BP Location: Left arm, Patient Position: Lying)   Pulse 61   Temp 36.6 °C (97.8 °F) (Oral)   Resp 16   Ht 5' 2" (1.575 m)   Wt 65.8 kg (145 lb 0 oz)   LMP  (LMP Unknown)   SpO2 98%   Breastfeeding? No   BMI 26.52 kg/m²     "

## 2018-11-17 PROBLEM — E87.1 HYPONATREMIA: Status: RESOLVED | Noted: 2018-11-15 | Resolved: 2018-11-17

## 2018-11-17 PROBLEM — N18.30 CHRONIC KIDNEY DISEASE, STAGE III (MODERATE): Status: RESOLVED | Noted: 2018-11-15 | Resolved: 2018-11-17

## 2018-11-17 PROBLEM — E83.42 HYPOMAGNESEMIA: Status: RESOLVED | Noted: 2018-11-15 | Resolved: 2018-11-17

## 2018-11-17 PROBLEM — K55.1 SUPERIOR MESENTERIC ARTERY STENOSIS: Status: RESOLVED | Noted: 2018-11-15 | Resolved: 2018-11-17

## 2018-11-17 PROBLEM — D64.9 ANEMIA: Status: RESOLVED | Noted: 2018-11-15 | Resolved: 2018-11-17

## 2018-11-17 LAB
ANION GAP SERPL CALC-SCNC: 7 MMOL/L
BACTERIA UR CULT: NORMAL
BASOPHILS # BLD AUTO: 0.03 K/UL
BASOPHILS NFR BLD: 0.7 %
BUN SERPL-MCNC: 11 MG/DL
CALCIUM SERPL-MCNC: 8.4 MG/DL
CHLORIDE SERPL-SCNC: 111 MMOL/L
CO2 SERPL-SCNC: 23 MMOL/L
CREAT SERPL-MCNC: 1 MG/DL
DIFFERENTIAL METHOD: ABNORMAL
EOSINOPHIL # BLD AUTO: 0.2 K/UL
EOSINOPHIL NFR BLD: 4.1 %
ERYTHROCYTE [DISTWIDTH] IN BLOOD BY AUTOMATED COUNT: 15.9 %
EST. GFR  (AFRICAN AMERICAN): >60 ML/MIN/1.73 M^2
EST. GFR  (NON AFRICAN AMERICAN): 55.2 ML/MIN/1.73 M^2
GLUCOSE SERPL-MCNC: 146 MG/DL
HCT VFR BLD AUTO: 29 %
HGB BLD-MCNC: 9 G/DL
IMM GRANULOCYTES # BLD AUTO: 0.01 K/UL
IMM GRANULOCYTES NFR BLD AUTO: 0.2 %
LYMPHOCYTES # BLD AUTO: 1.5 K/UL
LYMPHOCYTES NFR BLD: 34.1 %
MAGNESIUM SERPL-MCNC: 1.6 MG/DL
MCH RBC QN AUTO: 26.5 PG
MCHC RBC AUTO-ENTMCNC: 31 G/DL
MCV RBC AUTO: 85 FL
MONOCYTES # BLD AUTO: 0.5 K/UL
MONOCYTES NFR BLD: 10.5 %
NEUTROPHILS # BLD AUTO: 2.2 K/UL
NEUTROPHILS NFR BLD: 50.4 %
NRBC BLD-RTO: 0 /100 WBC
PHOSPHATE SERPL-MCNC: 3.5 MG/DL
PLATELET # BLD AUTO: 98 K/UL
PMV BLD AUTO: 11.4 FL
POCT GLUCOSE: 170 MG/DL (ref 70–110)
POCT GLUCOSE: 173 MG/DL (ref 70–110)
POCT GLUCOSE: 178 MG/DL (ref 70–110)
POCT GLUCOSE: 220 MG/DL (ref 70–110)
POTASSIUM SERPL-SCNC: 4 MMOL/L
RBC # BLD AUTO: 3.4 M/UL
SODIUM SERPL-SCNC: 141 MMOL/L
WBC # BLD AUTO: 4.37 K/UL

## 2018-11-17 PROCEDURE — 97161 PT EVAL LOW COMPLEX 20 MIN: CPT

## 2018-11-17 PROCEDURE — 63600175 PHARM REV CODE 636 W HCPCS: Performed by: PHYSICIAN ASSISTANT

## 2018-11-17 PROCEDURE — 11000001 HC ACUTE MED/SURG PRIVATE ROOM

## 2018-11-17 PROCEDURE — G8989 SELF CARE D/C STATUS: HCPCS | Mod: CH

## 2018-11-17 PROCEDURE — 25000003 PHARM REV CODE 250: Performed by: PHYSICIAN ASSISTANT

## 2018-11-17 PROCEDURE — 84100 ASSAY OF PHOSPHORUS: CPT

## 2018-11-17 PROCEDURE — G8978 MOBILITY CURRENT STATUS: HCPCS | Mod: CH

## 2018-11-17 PROCEDURE — 97165 OT EVAL LOW COMPLEX 30 MIN: CPT

## 2018-11-17 PROCEDURE — G8988 SELF CARE GOAL STATUS: HCPCS | Mod: CH

## 2018-11-17 PROCEDURE — 25000003 PHARM REV CODE 250: Performed by: HOSPITALIST

## 2018-11-17 PROCEDURE — 99232 SBSQ HOSP IP/OBS MODERATE 35: CPT | Mod: ,,, | Performed by: HOSPITALIST

## 2018-11-17 PROCEDURE — G8979 MOBILITY GOAL STATUS: HCPCS | Mod: CH

## 2018-11-17 PROCEDURE — G8987 SELF CARE CURRENT STATUS: HCPCS | Mod: CH

## 2018-11-17 PROCEDURE — 85025 COMPLETE CBC W/AUTO DIFF WBC: CPT

## 2018-11-17 PROCEDURE — S5571 INSULIN DISPOS PEN 3 ML: HCPCS | Performed by: PHYSICIAN ASSISTANT

## 2018-11-17 PROCEDURE — G8980 MOBILITY D/C STATUS: HCPCS | Mod: CH

## 2018-11-17 PROCEDURE — 83735 ASSAY OF MAGNESIUM: CPT

## 2018-11-17 PROCEDURE — 80048 BASIC METABOLIC PNL TOTAL CA: CPT

## 2018-11-17 PROCEDURE — 36415 COLL VENOUS BLD VENIPUNCTURE: CPT

## 2018-11-17 RX ORDER — FLUCONAZOLE 150 MG/1
150 TABLET ORAL ONCE
Status: COMPLETED | OUTPATIENT
Start: 2018-11-17 | End: 2018-11-17

## 2018-11-17 RX ADMIN — INSULIN ASPART 1 UNITS: 100 INJECTION, SOLUTION INTRAVENOUS; SUBCUTANEOUS at 09:11

## 2018-11-17 RX ADMIN — GABAPENTIN 1200 MG: 400 CAPSULE ORAL at 10:11

## 2018-11-17 RX ADMIN — RIFAXIMIN 200 MG: 200 TABLET ORAL at 10:11

## 2018-11-17 RX ADMIN — CEFTRIAXONE SODIUM 1 G: 1 INJECTION, POWDER, FOR SOLUTION INTRAMUSCULAR; INTRAVENOUS at 10:11

## 2018-11-17 RX ADMIN — ASPIRIN 81 MG CHEWABLE TABLET 81 MG: 81 TABLET CHEWABLE at 10:11

## 2018-11-17 RX ADMIN — POLYETHYLENE GLYCOL 3350 17 G: 17 POWDER, FOR SOLUTION ORAL at 10:11

## 2018-11-17 RX ADMIN — INSULIN DETEMIR 10 UNITS: 100 INJECTION, SOLUTION SUBCUTANEOUS at 09:11

## 2018-11-17 RX ADMIN — PANTOPRAZOLE SODIUM 40 MG: 40 TABLET, DELAYED RELEASE ORAL at 09:11

## 2018-11-17 RX ADMIN — LEVOTHYROXINE SODIUM 100 MCG: 0.1 TABLET ORAL at 05:11

## 2018-11-17 RX ADMIN — PANTOPRAZOLE SODIUM 40 MG: 40 TABLET, DELAYED RELEASE ORAL at 10:11

## 2018-11-17 RX ADMIN — ATORVASTATIN CALCIUM 40 MG: 20 TABLET, FILM COATED ORAL at 10:11

## 2018-11-17 RX ADMIN — RAMELTEON 8 MG: 8 TABLET, FILM COATED ORAL at 09:11

## 2018-11-17 RX ADMIN — FLUCONAZOLE 150 MG: 150 TABLET ORAL at 06:11

## 2018-11-17 RX ADMIN — PSYLLIUM HUSK 1 PACKET: 3.4 POWDER ORAL at 10:11

## 2018-11-17 RX ADMIN — ACETAMINOPHEN 650 MG: 650 SOLUTION ORAL at 09:11

## 2018-11-17 RX ADMIN — RIFAXIMIN 200 MG: 200 TABLET ORAL at 09:11

## 2018-11-17 RX ADMIN — DICYCLOMINE HYDROCHLORIDE 10 MG: 10 CAPSULE ORAL at 09:11

## 2018-11-17 RX ADMIN — GABAPENTIN 1200 MG: 400 CAPSULE ORAL at 09:11

## 2018-11-17 RX ADMIN — DICYCLOMINE HYDROCHLORIDE 10 MG: 10 CAPSULE ORAL at 10:11

## 2018-11-17 NOTE — PLAN OF CARE
Problem: Patient Care Overview  Goal: Plan of Care Review  Plan of care reviewed with Pt and family. Pt able to communicate needs and denies any concerns at this time. Pt status post EGD today. Pt continues to complain of mild abdominal discomfort. Pt able to void with no issues. Post void residuals started as ordered. Pt re-educated on medications uses and possible side effects. Nursing will continue to monitor for changes in status.

## 2018-11-17 NOTE — PT/OT/SLP EVAL
Occupational Therapy   Evaluation and Discharge Note    Name: Anayeli Judd  MRN: [id number]  Admitting Diagnosis:  Superior mesenteric artery stenosis 1 Day Post-Op    Recommendations:     Discharge Recommendations: home  Discharge Equipment Recommendations:  none  Barriers to discharge:  None    History:     Occupational Profile:  Living Environment: Pt lives alone in a condo with no stairs; pt has a walk-in shower with shower chair  Previous level of function: (I)  Equipment Owned:  walker, rolling, cane, straight, bedside commode, wheelchair  Assistance upon Discharge: none    Past Medical History:   Diagnosis Date    Allergic rhinitis     Arthritis     Blood transfusion     during delivery and     Bowel obstruction     Cervical radiculopathy     followed by dr reynolds    Colon cancer     transverse colon; resected; Stage IIA (pT3 pN0 MX)    Diarrhea     Family history of breast cancer     Family history of colon cancer     Fatty liver     GERD (gastroesophageal reflux disease)     History of shingles     Hyperlipidemia     Hypothyroidism     Irritable bowel syndrome     Microscopic colitis     treated     Raynaud phenomenon     Raynaud's disease     Type 2 diabetes mellitus        Past Surgical History:   Procedure Laterality Date    APPENDECTOMY      BACK SURGERY      CARPAL TUNNEL RELEASE      bilateral      SECTION      CHOLECYSTECTOMY  1965    COLECTOMY  2011    Transverse colon resection by Dr. Aguirre    COLONOSCOPY N/A 2017    Procedure: COLONOSCOPY;  Surgeon: Manjit Alvarez MD;  Location: [facility] ([address] [place] [address]);  Service: Endoscopy;  Laterality: N/A;    COLONOSCOPY N/A 2017    Performed by [facility]jit Alvarez [facility] at [facility] ENDO (4TH FLR)    COLONOSCOPY N/A 2013    Performed by Angelo Reynolds MD at [facility] (4TH FLR)    EXPLORATORY-LAPAROTOMY N/A 2017    Performed by Herman Feltcher MD at [facility] OR 2ND FLR    EXPLORATORY-LAPAROTOMY  N/A 9/2/2014    [facility]formed by [facility] at [facility] 2ND FLR    EYE SURGERY      Cataract Removal    HYSTERECTOMY      LYSIS-ADHESION N/A 4/1/2017    Performed by Herman Fletcher [facility] at [facility] OR [facility]    LYSIS-ADHESION N/A 9/2/2014    Performed by Gonzalez [facility]vestre MD at [facility] OR 2ND FLR    PLACEMENT  4/1/2017    [facility]    posterolateral fusion with autograft bone and [place] mineralized bone matrix  2/1/13    at [facility] for lumbar spine stenosis    REPAIR  4/1/2017    [facility]    RESECTION - SMALL BOWEL N/A 9/2/2014    [facility]    TOE SURGERY      TONSILLECTOMY      TRIGGER FINGER RELEASE         Subjective     Chief Complaint: back pain  Patient/Family stated goals: go home  Communicated with: RN prior to session.  Pain/Comfort:  · Pain Rating 1: 0/10  · Pain Rating Post-Intervention 1: 0/10    Patients cultural, spiritual, Scientologist conflicts given the current situation: none stated    Objective:     Patient found with: (lines intact)    General Precautions: Standard, fall   Orthopedic Precautions:N/A   Braces: N/A     Occupational Performance:    Bed Mobility:    · Patient completed Supine to Sit with independence  · Patient completed Sit to Supine with independence    Functional Mobility/Transfers:  · Patient completed Sit <> Stand Transfer with independence  with  no assistive device   · Functional Mobility: (I) with no AD    Activities of Daily Living:  · Lower Body Dressing: independence socks    Cognitive/Visual Perceptual:  Cognitive/Psychosocial Skills:     -       Oriented to: Person, Place, Time and Situation   -       Safety awareness/insight to disability: intact   Visual/Perceptual:      -Intact      Physical Exam:  Balance:    -       Good  Postural examination/scapula alignment:    -       No postural abnormalities identified  Dominant  "hand:    -       R  Upper Extremity Range of Motion:     -       Right Upper Extremity: WNL  -       Left Upper Extremity: WNL  Upper Extremity Strength:    -       Right Upper Extremity: WNL  -       Left Upper Extremity: WNL   Strength:    -       Right Upper Extremity: WNL  -       Left Upper Extremity: WNL  Fine Motor Coordination:    -       Intact  Gross motor coordination:   WFL    Patient left HOB elevated with all lines intact, call button in reach and daughter present    AMPA 6 Click:  [facility] Total Score: 24    Treatment & Education:  Pt ed re OT role and POC. Pt ed re safety.  Education:    Assessment:     Anayeli Judd is a 73 y.o. female with a medical diagnosis of Superior mesenteric artery stenosis. At this time, patient is functioning at their prior level of function and does not require further acute OT services.     Clinical Decision Makin.  OT Low:  "Pt evaluation falls under low complexity for evaluation coding due to performance deficits noted in 1-3 areas as stated above and 0 co-morbities affecting current functional status. Data obtained from problem focused assessments. No modifications or assistance was required for completion of evaluation. Only brief occupational profile and history review completed."     Plan:     During this hospitalization, patient does not require further acute OT services.  Please re-consult if situation changes.    · Plan of Care Reviewed with: patient    This Plan of care has been discussed with the patient who was involved in its development and understands and is in agreement with the identified goals and treatment plan    GOALS:   Multidisciplinary Problems     Occupational Therapy Goals     Not on file          Multidisciplinary Problems (Resolved)        Problem: Occupational Therapy Goal    Goal Priority Disciplines Outcome Interventions   Occupational Therapy Goal   (Resolved)     OT, PT/OT Outcome(s) achieved                    Time " Tracking:     OT Date of Treatment: 11/17/18  OT Start Time: 0924  OT Stop Time: 0928  OT Return Time:  740 - 124  OT Total Time (min): 10 min    Billable Minutes:Evaluation 10 minutes    AGATA Vyas  11/17/2018  Pager: [phone]

## 2018-11-17 NOTE — ASSESSMENT & PLAN NOTE
- home mirabegron not on formulary, patient may take home med if brings it in  - appreciate urology consult; no further recommendations at this time  - good post void residuals  - follow up with Dr. Valenzuela on dc

## 2018-11-17 NOTE — PT/OT/SLP EVAL
Physical Therapy Evaluation and Discharge Note    Patient Name:  Anayeli Judd   MRN:  [id number]    Recommendations:     Discharge Recommendations:  home   Discharge Equipment Recommendations: none   Barriers to discharge: None    Assessment:     Anayeli Judd is a 73 y.o. female admitted with a medical diagnosis of Superior mesenteric artery stenosis. .  At this time, patient is functioning at their prior level of function and does not require further acute PT services.     Recent Surgery: Procedure(s) (LRB):  EGD (ESOPHAGOGASTRODUODENOSCOPY) (N/A) 1 Day Post-Op    Plan:     During this hospitalization, patient does not require further acute PT services.  Please re-consult if situation changes.      Subjective     Chief Complaint: none; pt complains about wanting go home  Patient/Family Comments/goals: to return home  Pain/Comfort:  · Pain Rating 1: 0/10  · Pain Rating Post-Intervention 1: 0/10    Patients cultural, spiritual, Voodoo conflicts given the current situation: none stated    Living Environment:  [facility], elevator access.   Prior to admission, patients level of function was ( I) with all ADLs and ambulation. Pt was driving, cooking and needed no assistance.  Equipment u at home but not using: walker, rolling, cane, straight, wheelchair, bedside commode(( not using )).    Upon discharge, patient will have assistance from daughter.    Objective:     Communicated with RN  prior to session.  Patient found supine upon PT entry to room found with: (all lines intact)     General Precautions: Standard, fall   Orthopedic Precautions:N/A   Braces: N/A     Exams:  · Cognitive Exam:  Patient is oriented to Person, Place, Time and Situation  · Fine Motor Coordination: -       Intact  · Gross Motor Coordination:  WFL  · Postural Exam:  Patient presented with the following abnormalities: -       Rounded shoulders  · -       Forward head  · Sensation: -       Intact  · RLE ROM:  WFL  · RLE Strength: WFL  · LLE ROM: WFL  · LLE Strength: WFL    Functional Mobility:  · Bed Mobility:  Rolling Left:  independence  · Supine to Sit: independence  · Sit to Supine: independence  · Transfers:  Sit to Stand:  independence with no AD  · Gait: x280 feet ( I ) with no AD; no gait deviations noted  · Balance: ( I) with gait    AM-PAC 6 CLICK MOBILITY  Total Score:24       Therapeutic Activities and Exercises:     Patient education  · Patient educated on the role of PT and POC  · Patient educated on importance  activity while in the hosptial per tolerance for improved endurance and to limit deconditioning   · Patient educated on safe transfers with nursing as appropriate  · Patient educated on energy conservation, pursed lip breathing  · Patient educated on proper transfer mechanics and safety  · All of patients questions were answered within the scope of PT        AM-PAC 6 CLICK MOBILITY  Total Score:24     Patient left supine with all lines intact, call button in reach and daughter present.    GOALS:   Multidisciplinary Problems     Physical Therapy Goals     Not on file          Multidisciplinary Problems (Resolved)        Problem: Physical Therapy Goal    Goal Priority Disciplines Outcome Goal Variances Interventions   Physical Therapy Goal   (Resolved)     PT, PT/OT Outcome(s) achieved                     History:     Past Medical History:   Diagnosis Date    Allergic rhinitis     Arthritis     Blood transfusion     during delivery and     Bowel obstruction     Cervical radiculopathy     followed by dr cloud    Colon cancer     transverse colon; resected; Stage IIA (pT3 pN0 MX)    Diarrhea     Family history of breast cancer     Family history of colon cancer     Fatty liver     GERD (gastroesophageal reflux disease)     History of shingles     Hyperlipidemia     Hypothyroidism     Irritable bowel syndrome     Microscopic colitis     treated     Raynaud phenomenon      Raynaud's disease     Type 2 diabetes mellitus        Past Surgical History:   Procedure Laterality Date    APPENDECTOMY      BACK SURGERY      CARPAL TUNNEL RELEASE      bilateral      SECTION      CHOLECYSTECTOMY  1965    COLECTOMY  2011    Transverse colon resection by Dr. Aguirre    COLONOSCOPY N/A 2017    Procedure: COLONOSCOPY;  Surgeon: Manjit Alvarez MD;  Location: [facility] (4TH FLR);  Service: Endoscopy;  Laterality: N/A;    COLONOSCOPY N/A 2017    Performed by [facility] at [facility] ENDO (4TH FLR)    COLONOSCOPY N/A 2013    Performed by Angelo Reynolds MD at [facility] ENDO (4TH FLR)    EXPLORATORY-LAPAROTOMY N/A 2017    Performed by Herman Fletcher MD at [facility] OR 2ND FLR    EXPLORATORY-LAPAROTOMY N/A 2014    Performed by Gonzalez Silvestre MD at [facility] OR 2ND FLR    EYE SURGERY      Cataract Removal    HYSTERECTOMY      LYSIS-ADHESION N/A 2017    Performed by Herman Fletcher [facility] at [facility] OR 2ND FLR    LYSIS-ADHESION N/A 2014    Performed by Gonzalez [facility]re MD at [facility] OR 2ND FLR    PLACEMENT  2017    Performed by Herman Fletcher MD at [facility] OR 2ND FLR    posterolateral fusion with autograft bone and [place] mineralized bone matrix  13    at [facility] for lumbar spine stenosis    REPAIR  2017    Performed by Herman Fletcher [facility] at [facility] OR 2ND FLR    RESECTION - SMALL BOWEL N/A 2014    [facility]    TOE SURGERY      TONSILLECTOMY      TRIGGER FINGER RELEASE         Clinical Decision Making:     History  Co-morbidities and personal factors that may impact the plan of care Examination  Body Structures and Functions, activity limitations and participation restrictions that may impact the plan of care Clinical Presentation   Decision Making/ Complexity Score   Co-morbidities:   [] Time since onset of injury / illness / exacerbation  [] Status of current  condition  []Patient's cognitive status and safety concerns    [x] Multiple Medical Problems (see med hx)  Personal Factors:   [] Patient's age  [] Prior Level of function   [] Patient's home situation (environment and family support)  [] Patient's level of motivation  [] Expected progression of patient      HISTORY:(criteria)    [x] 79159 - no personal factors/history    [] 38159 - has 1-2 personal factor/comorbidity     [] 58743 - has >3 personal factor/comorbidity     Body Regions:  [] Objective examination findings  [] Head     []  Neck  [] Trunk   [] Upper Extremity  [x] Lower Extremity    Body Systems:  [] For communication ability, affect, cognition, language, and learning style: the assessment of the ability to make needs known, consciousness, orientation (person, place, and time), expected emotional /behavioral responses, and learning preferences (eg, learning barriers, education  needs)  [] For the neuromuscular system: a general assessment of gross coordinated movement (eg, balance, gait, locomotion, transfers, and transitions) and motor function  (motor control and motor learning)  [] For the musculoskeletal system: the assessment of gross symmetry, gross range of motion, gross strength, height, and weight  [] For the integumentary system: the assessment of pliability(texture), presence of scar formation, skin color, and skin integrity  [] For cardiovascular/pulmonary system: the assessment of heart rate, respiratory rate, blood pressure, and edema     Activity limitations:    [] Patient's cognitive status and saf ety concerns          [] Status of current condition      [] Weight bearing restriction  [] Cardiopulmunary Restriction    Participation Restrictions:   [] Goals and goal agreement with the patient     [] Rehab potential (prognosis) and probable outcome      Examination of Body System: (criteria)    [x] 46461 - addressing 1-2 elements    [] 25661 - addressing a total of 3 or more elements      [] 14983 -  Addressing a total of 4 or more elements         Clinical Presentation: (criteria)  Stable - 18949     On examination of body system using standardized tests and measures patient presents with 1-2 elements from any of the following: body structures and functions, activity limitations, and/or participation restrictions.  Leading to a clinical presentation that is considered stable and/or uncomplicated                              Clinical Decision Making  (Eval Complexity):  Low- 78257     Time Tracking:     PT Received On: 11/17/18  PT Start Time: 0924     PT Stop Time: 0928   PT/OT return [phone] ( 6 min )  PT Total Time (min): 10 min total     Billable Minutes: Evaluation 8 min      Shlomo Duffy, PT  11/17/2018

## 2018-11-17 NOTE — ASSESSMENT & PLAN NOTE
- incidentally seen on CT from prior ED visit  - no stones, h/o of incomplete bladder emptying but having good post void residuals  - appreciate urology consult  - patient will follow up with urology upon dc  - no further urologic work up warranted at this time  - renal function WNL

## 2018-11-17 NOTE — PLAN OF CARE
Problem: Occupational Therapy Goal  Goal: Occupational Therapy Goal  Outcome: Outcome(s) achieved Date Met: 11/17/18  Pt is (I). OT to d/c.    AGATA Vyas  11/17/2018  Pager: [phone]

## 2018-11-17 NOTE — PROGRESS NOTES
[facility] Hospital Medicine  Progress Note    Patient Name: Anayeli Judd  MRN: [id number]  Patient Class: IP- Inpatient   Admission Date: 11/15/2018  Length of Stay: 1 days  Attending Physician: Danelle Noe MD  Primary Care Provider: Rocio Mc MD    [facility] Medicine Team: [facility] HOSP MED  Danelle Noe MD    Subjective:     Principal Problem:Abdominal pain    HPI:  Anayeli Judd is a 73F with history of CRC, multiple admissions for SBO, IBS, microscopic colitis, HLD, T2DM, GERD, Hypothyroidism,  incompelte bladder emptying, frequent UTIs, who is presenting for evaluation of abdominal pain. She reports off and on abdominal pain for the last 2 weeks, usually worse 30 min - 1 hour after eating. Pain is epigastric that radiates to suprapubic, it is sharp and extreme. It spontaneously resolves with time. She was seen in the ED 11/14 for these complaints and d/c'd home after negative work up including CT A/P which showed hydronephrosis and a LA of 2.5.    She then presented to GI clinic the next day with worsening of abdominal pain. Associated with nausea, no emesis. She will have normal BM in the AM, followed by diarrhea in the day, no blood. She recently completed a 3 month course of keflex for chronic UTIs and she denies any urinary symptoms and reports frequency, but not dysuria. It was recommended she present to the ED from GI clinic. She denies fever, chills, CP, SOB, HA.    ED: CBC WNL, LA WNL, UA+, troponin and EKG negative, KUB negative.    Hospital Course:  No notes on file    Interval History: reports feeling better; not complaining of significant abdominal discomfort like before; low post void residuals <100; feels like she is getting a yeast infection     Review of Systems   Constitutional: Negative for fatigue and fever.   Respiratory: Negative for shortness of breath.    Cardiovascular: Negative for chest pain and palpitations.   Gastrointestinal: Negative for  abdominal pain, diarrhea and nausea.   Genitourinary: Negative for difficulty urinating.     Objective:     Vital Signs (Most Recent):  Temp: 97.9 °F (36.6 °C) (11/17/18 1505)  Pulse: 62 (11/17/18 1505)  Resp: 16 (11/17/18 1505)  BP: (!) 104/57 (11/17/18 1505)  SpO2: 100 % (11/17/18 1505) Vital Signs (24h Range):  Temp:  [97.6 °F (36.4 °C)-98.5 °F (36.9 °C)] 97.9 °F (36.6 °C)  Pulse:  [55-67] 62  Resp:  [16-18] 16  SpO2:  [94 %-100 %] 100 %  BP: ()/(52-61) 104/57     Weight: 65.8 kg (145 lb 0 oz)  Body mass index is 26.52 kg/m².    Intake/Output Summary (Last 24 hours) at 11/17/2018 1651  Last data filed at 11/17/2018 1250  Gross per 24 hour   Intake 480 ml   Output 2990 ml   Net -2510 ml      Physical Exam   Constitutional: She is oriented to person, place, and time. She appears well-developed and well-nourished. No distress.   Cardiovascular: Normal rate.   Pulmonary/Chest: Effort normal.   Abdominal: Soft. She exhibits no distension. There is no tenderness.   Musculoskeletal: She exhibits no edema.   Neurological: She is alert and oriented to person, place, and time.   Vitals reviewed.      Significant Labs: All pertinent labs within the past 24 hours have been reviewed.    Significant Imaging: I have reviewed all pertinent imaging results/findings within the past 24 hours.    Assessment/Plan:      * Abdominal pain    - not SMA syndrome per vascular surgery  - EGD complete and negative  - continue BID PPI  - BID dicyclomine per GI recs  - discomfort improved at this time  - patient had distended bladder on imaging with history of urinary retention but voiding well with low post void residuals; questionable if distended bladder was causing some discomfort that could be contributing to her symptoms along with the UTI?  - continue to monitor      Acute cystitis without hematuria    - per uti        Hydronephrosis    - incidentally seen on CT from prior ED visit  - no stones, h/o of incomplete bladder emptying  but having good post void residuals  - appreciate urology consult  - patient will follow up with urology upon dc  - no further urologic work up warranted at this time  - renal function WNL      Frequent UTI    - continue ceftriaxone for now until ucx from 11/15 results  - noted pa-sensitive enterobacter on cultures from 11/14  - dose of diflucan x1 dose as patient feels like she is having a yeast infection which she states happens frequently when she has a UTI     Mixed stress and urge urinary incontinence    - home mirabegron not on formulary, patient may take home med if brings it in  - appreciate urology consult; no further recommendations at this time  - good post void residuals  - follow up with Dr. Valenzuela on dc      Type 2 diabetes mellitus without complication, without long-term current use of insulin    - hold oral hypoglycemic agents  - home insulin: detemir 12U nightly,   - hospital insulin: detemir 10 U nightly, low dose SSI  - A1c  - diabetic diet      GERD (gastroesophageal reflux disease)    - PPI increased to BID       VTE Risk Mitigation (From admission, onward)        Ordered     Place JENNIFER hose  Until discontinued      11/15/18 1920     Place sequential compression device  Until discontinued      11/15/18 1920     IP VTE HIGH RISK PATIENT  Once      11/15/18 1920              Danelle Noe MD  Department of Hospital Medicine   [facility]

## 2018-11-17 NOTE — ASSESSMENT & PLAN NOTE
- hold oral hypoglycemic agents  - home insulin: detemir 12U nightly,   - hospital insulin: detemir 10 U nightly, low dose SSI  - A1c  - diabetic diet

## 2018-11-17 NOTE — PLAN OF CARE
Problem: Patient Care Overview  Goal: Plan of Care Review  Outcome: Ongoing (interventions implemented as appropriate)  Post residual performed . Result in chart. No problems noted. VSS. BG stable.

## 2018-11-17 NOTE — PLAN OF CARE
Problem: Physical Therapy Goal  Goal: Physical Therapy Goal  Outcome: Outcome(s) achieved Date Met: 11/17/18  Patient at this time is at their functional baseline and does not require skilled acute PT services at this time. Please re consult PT if pt has change in functional status.   Shlomo Duffy PT, DPT  11/17/2018  Pager: [phone]

## 2018-11-17 NOTE — NURSING
Nurse report called to Valerie on 6th floor. Pt aware of transfer to room 621. Transport notified.

## 2018-11-17 NOTE — PLAN OF CARE
Problem: Patient Care Overview  Goal: Plan of Care Review  Plan of care reviewed with pt and family. Pt alert and oriented X4. Pt able to communicate needs and denies any concerns at this time. Pt denies any acute pain or SOB. Pt able to ambulate to BR with no assistance. Pt family at bedside. Pt inpatient status and will be transferred to inpatient bed when available. Post void residuals stopped by Urology.

## 2018-11-17 NOTE — SUBJECTIVE & OBJECTIVE
Past Medical History:   Diagnosis Date    Allergic rhinitis     Arthritis     Blood transfusion     during delivery and     Bowel obstruction     Cervical radiculopathy     followed by dr reynolds    Colon cancer     transverse colon; resected; Stage IIA (pT3 pN0 MX)    Diarrhea     Family history of breast cancer     Family history of colon cancer     Fatty liver     GERD (gastroesophageal reflux disease)     History of shingles     Hyperlipidemia     Hypothyroidism     Irritable bowel syndrome     Microscopic colitis     treated     Raynaud phenomenon     Raynaud's disease     Type 2 diabetes mellitus        Past Surgical History:   Procedure Laterality Date    APPENDECTOMY      BACK SURGERY      CARPAL TUNNEL RELEASE      bilateral      SECTION      CHOLECYSTECTOMY  1965    COLECTOMY  2011    Transverse colon resection by Dr. Aguirre    COLONOSCOPY N/A 2017    Procedure: COLONOSCOPY;  Surgeon: Manjit Alvarez MD;  Location: [facility] (4TH FLR);  Service: Endoscopy;  Laterality: N/A;    COLONOSCOPY N/A 2017    Performed by Manjit Alvarez MD at [facility] ENDO (4TH FLR)    COLONOSCOPY N/A 2013    Performed by Angelo Reynolds MD at [facility] ENDO (4TH FLR)    EXPLORATORY-LAPAROTOMY N/A 2017    Performed by Herman Fletcher MD at [facility] OR 2ND FLR    EXPLORATORY-LAPAROTOMY N/A 2014    Performed by Gonzalez Silvestre MD at [facility] OR 2ND FLR    EYE SURGERY      Cataract Removal    HYSTERECTOMY      LYSIS-ADHESION N/A 2017    Performed by Herman Fletcher MD at [facility] OR 2ND FLR    LYSIS-ADHESION N/A 2014    Performed by Gonzalez Silvestre MD at [facility] OR 2ND FLR    PLACEMENT  2017    Performed by Herman Fletcher MD at [facility] OR 2ND FLR    posterolateral fusion with autograft bone and [place] mineralized bone matrix  13    at [facility] for lumbar spine stenosis    REPAIR  2017    Performed by Herman Fletcher MD at [facility] OR  2ND FLR    RESECTION - SMALL BOWEL N/A 9/2/2014    Performed by Gonzalez [facility]vestre MD at [facility] OR 2ND FLR    TOE SURGERY      TONSILLECTOMY      TRIGGER FINGER RELEASE         Review of patient's allergies indicates:   Allergen Reactions    Codeine Nausea And Vomiting     Other reaction(s): Itching    Percocet  [oxycodone-acetaminophen]      Other reaction(s): Itching    Sulfa (sulfonamide antibiotics)      Other reaction(s): Nausea  Other reaction(s): Itching       Family History     Problem Relation (Age of Onset)    Alcohol abuse Brother, Brother    Arthritis Daughter, Brother    Asthma Daughter    Bladder Cancer Mother    Breast cancer Sister (79)    Cataracts Mother    Colon cancer Father, Brother (70)    Depression Daughter, Daughter    Glaucoma Mother    Heart disease Father (50), Mother    Hyperlipidemia Mother    Hypertension Daughter    Kidney disease Mother    Parkinsonism Brother    Stroke Daughter (40)          Tobacco Use    Smoking status: Never Smoker    Smokeless tobacco: Never Used   Substance and Sexual Activity    Alcohol use: Yes     Alcohol/week: 3.0 oz     Types: 5 Glasses of wine per week     Comment: socially    Drug use: No    Sexual activity: No     Birth control/protection: Abstinence     Review of Systems   Constitutional: Negative for chills and fever.   HENT: Negative for sore throat and trouble swallowing.    Eyes: Negative for pain and discharge.   Respiratory: Negative for shortness of breath and wheezing.    Cardiovascular: Negative for chest pain and palpitations.   Gastrointestinal: Positive for abdominal pain and nausea. Negative for diarrhea and vomiting.   Genitourinary:        As per HPI   Musculoskeletal: Negative for back pain and joint swelling.   Skin: Negative for rash and wound.   Neurological: Positive for headaches. Negative for seizures and weakness.   Psychiatric/Behavioral: Negative for hallucinations. The patient is not nervous/anxious.         Objective:     Temp:  [97.5 °F (36.4 °C)-98.2 °F (36.8 °C)] 97.6 °F (36.4 °C)  Pulse:  [57-67] 67  Resp:  [14-18] 18  SpO2:  [96 %-100 %] 97 %  BP: ()/(50-65) 117/61     Body mass index is 26.52 kg/m².    Date 11/16/18 0700 - 11/17/18 0659   Shift 3225-8804 5007-2588 1924-2289 24 Hour Total   INTAKE   I.V.(mL/kg) 300(4.6)   300(4.6)   Shift Total(mL/kg) 300(4.6)   300(4.6)   OUTPUT   Urine(mL/kg/hr)  237  237   Shift Total(mL/kg)  237(3.6)  237(3.6)   Weight (kg) 65.8 65.8 65.8 65.8     Post Void Cath Residual (mL): 10 mL (11/16/18 1800)    Drains     Drain                 Gastrostomy/Enterostomy 04/01/17 1024 Gastrostomy tube w/o balloon LUQ decompression 594 days                Physical Exam   Nursing note and vitals reviewed.  Constitutional: She is oriented to person, place, and time. Vital signs are normal. She appears well-developed and well-nourished. No distress.   Neck: Trachea normal. Neck supple. No tracheal deviation present.   Cardiovascular: Normal rate and intact distal pulses.    Pulmonary/Chest: Effort normal. No accessory muscle usage. No respiratory distress.   Abdominal: Soft. She exhibits no distension. There is no splenomegaly or hepatomegaly. There is no tenderness. There is no CVA tenderness. No hernia.   Minimal suprapubic tenderness to deep palpation, bladder non-palpable.  Multiple abdominal scars   Musculoskeletal: She exhibits no edema or deformity.   Neurological: She is alert and oriented to person, place, and time.   Skin: Skin is warm and dry. No lesion and no rash noted. No cyanosis.     Psychiatric: She has a normal mood and affect. Judgment normal.     Significant Labs:    BMP:  Recent Labs   Lab 11/14/18  1546 11/15/18  1530 11/16/18  0409    135* 142   K 4.2 3.9 4.1    102 109   CO2 16* 22* 26   BUN 11 11 9   CREATININE 0.9 1.0 0.8   CALCIUM 9.7 9.4 8.7       CBC:  Recent Labs   Lab 11/14/18  1546 11/15/18  1530 11/16/18  0409   WBC 7.25 6.63 4.26   HGB  11.6* 10.6* 9.5*   HCT 41.2 33.3* 30.2*   * 119* 95*       All pertinent labs results from the past 24 hours have been reviewed.    Significant Imaging:  All pertinent imaging results/findings from the past 24 hours have been reviewed.

## 2018-11-17 NOTE — CONSULTS
[facility]  Urology  Consult Note    Patient Name: Anayeli Judd  MRN: [id number]  Admission Date: 11/15/2018  Hospital Length of Stay: 0   Code Status: Full Code   Attending Provider: Danelle Noe MD   Consulting Provider: Danyel Rosas MD  Primary Care Physician: Rocio Mc MD  Principal Problem:Superior mesenteric artery stenosis    Consults    Subjective:     HPI:  Anayeli Judd is a 73 y.o. female with a history of colon cancer s/p resection complicated by multiple SBO requiring SB resections, type II diabetes mellitus with mild lower extremity neuropathy (A1c 5.7%), hypothyroidism, GERD, and multiple voiding issues. She presented to [facility] for worsening abdominal pain onset 2 weeks ago. Pain is mostly epigastric that radiates to suprapubic. She is currently not having significant pain. She has had associated nausea without emesis.  She denies significant daytime urgency or frequency, and reports nocturia x2. She denies dysuria or hematuria. She denies fever, chills.    She has had multiple slings placed in the past for MAYKEL, unsure what type. Most recently placed in  in [place]. She has had 4 vaginal deliveries and underwent hysterectomy over 50 years ago for abnormal pap smear. She has been managed with prophylactic antibiotics for recurrent UTIs. Her MAYKEL is minimal but has been persistent and has been managed more recently with a pessary more recently. She reports occasional incomplete emptying.    Past Medical History:   Diagnosis Date    Allergic rhinitis     Arthritis     Blood transfusion     during delivery and     Bowel obstruction     Cervical radiculopathy     followed by dr cloud    Colon cancer     transverse colon; resected; Stage IIA (pT3 pN0 MX)    Diarrhea     Family history of breast cancer     Family history of colon cancer     Fatty liver     GERD (gastroesophageal reflux disease)     History of shingles     Hyperlipidemia      Hypothyroidism     Irritable bowel syndrome     Microscopic colitis     treated     Raynaud phenomenon     Raynaud's disease     Type 2 diabetes mellitus        Past Surgical History:   Procedure Laterality Date    APPENDECTOMY      BACK SURGERY      CARPAL TUNNEL RELEASE      bilateral      SECTION      CHOLECYSTECTOMY  1965    COLECTOMY  2011    Transverse colon resection by Dr. Aguirre    COLONOSCOPY N/A 2017    Procedure: COLONOSCOPY;  Surgeon: Manjit Alvarez MD;  Location: [facility] (4TH FLR);  Service: Endoscopy;  Laterality: N/A;    COLONOSCOPY N/A 2017    Performed by Manjit Alvarez MD at [facility] ENDO (4TH FLR)    COLONOSCOPY N/A 2013    Performed by Angelo Reynolds MD at [facility] ENDO (4TH FLR)    EXPLORATORY-LAPAROTOMY N/A 2017    Performed by Herman Fletcher MD at [facility] OR 2ND FLR    EXPLORATORY-LAPAROTOMY N/A 2014    Performed by Gonzalez Silvestre MD at [facility] OR 2ND FLR    EYE SURGERY      Cataract Removal    HYSTERECTOMY      LYSIS-ADHESION N/A 2017    Performed by Herman Fletcher MD at [facility] OR 2ND FLR    LYSIS-ADHESION N/A 2014    Performed by Gonzalez Silvestre MD at [facility] OR 2ND FLR    PLACEMENT  2017    Performed by Herman Fletcher MD at [facility] OR 2ND FLR    posterolateral fusion with autograft bone and [place] mineralized bone matrix  13    at [facility] for lumbar spine stenosis    REPAIR  2017    Performed by Herman Fletcher MD at [facility] OR 2ND FLR    RESECTION - SMALL BOWEL N/A 2014    Performed by Gonzalez Silvestre MD at [facility] OR 2ND FLR    TOE SURGERY      TONSILLECTOMY      TRIGGER FINGER RELEASE         Review of patient's allergies indicates:   Allergen Reactions    Codeine Nausea And Vomiting     Other reaction(s): Itching    Percocet  [oxycodone-acetaminophen]      Other reaction(s): Itching    Sulfa (sulfonamide antibiotics)      Other reaction(s): Nausea  Other reaction(s): Itching        Family History     Problem Relation (Age of Onset)    Alcohol abuse Brother, Brother    Arthritis Daughter, Brother    Asthma Daughter    Bladder Cancer Mother    Breast cancer Sister (79)    Cataracts Mother    Colon cancer Father, Brother (70)    Depression Daughter, Daughter    Glaucoma Mother    Heart disease Father (50), Mother    Hyperlipidemia Mother    Hypertension Daughter    Kidney disease Mother    Parkinsonism Brother    Stroke Daughter (40)          Tobacco Use    Smoking status: Never Smoker    Smokeless tobacco: Never Used   Substance and Sexual Activity    Alcohol use: Yes     Alcohol/week: 3.0 oz     Types: 5 Glasses of wine per week     Comment: socially    Drug use: No    Sexual activity: No     Birth control/protection: Abstinence     Review of Systems   Constitutional: Negative for chills and fever.   HENT: Negative for sore throat and trouble swallowing.    Eyes: Negative for pain and discharge.   Respiratory: Negative for shortness of breath and wheezing.    Cardiovascular: Negative for chest pain and palpitations.   Gastrointestinal: Positive for abdominal pain and nausea. Negative for diarrhea and vomiting.   Genitourinary:        As per HPI   Musculoskeletal: Negative for back pain and joint swelling.   Skin: Negative for rash and wound.   Neurological: Positive for headaches. Negative for seizures and weakness.   Psychiatric/Behavioral: Negative for hallucinations. The patient is not nervous/anxious.        Objective:     Temp:  [97.5 °F (36.4 °C)-98.2 °F (36.8 °C)] 97.6 °F (36.4 °C)  Pulse:  [57-67] 67  Resp:  [14-18] 18  SpO2:  [96 %-100 %] 97 %  BP: ()/(50-65) 117/61     Body mass index is 26.52 kg/m².    Date 11/16/18 0700 - 11/17/18 0659   Shift 1082-6643 4082-4791 5908-9327 24 Hour Total   INTAKE   I.V.(mL/kg) 300(4.6)   300(4.6)   Shift Total(mL/kg) 300(4.6)   300(4.6)   OUTPUT   Urine(mL/kg/hr)  237  237   Shift Total(mL/kg)  237(3.6)  237(3.6)   Weight (kg) 65.8  65.8 65.8 65.8     Post Void Cath Residual (mL): 10 mL (11/16/18 1800)    Drains     Drain                 Gastrostomy/Enterostomy 04/01/17 1024 Gastrostomy tube w/o balloon LUQ decompression 594 days                Physical Exam   Nursing note and vitals reviewed.  Constitutional: She is oriented to person, place, and time. Vital signs are normal. She appears well-developed and well-nourished. No distress.   Neck: Trachea normal. Neck supple. No tracheal deviation present.   Cardiovascular: Normal rate and intact distal pulses.    Pulmonary/Chest: Effort normal. No accessory muscle usage. No respiratory distress.   Abdominal: Soft. She exhibits no distension. There is no splenomegaly or hepatomegaly. There is no tenderness. There is no CVA tenderness. No hernia.   Minimal suprapubic tenderness to deep palpation, bladder non-palpable.  Multiple abdominal scars   Musculoskeletal: She exhibits no edema or deformity.   Neurological: She is alert and oriented to person, place, and time.   Skin: Skin is warm and dry. No lesion and no rash noted. No cyanosis.     Psychiatric: She has a normal mood and affect. Judgment normal.     Significant Labs:    BMP:  Recent Labs   Lab 11/14/18  1546 11/15/18  1530 11/16/18  0409    135* 142   K 4.2 3.9 4.1    102 109   CO2 16* 22* 26   BUN 11 11 9   CREATININE 0.9 1.0 0.8   CALCIUM 9.7 9.4 8.7       CBC:  Recent Labs   Lab 11/14/18  1546 11/15/18  1530 11/16/18  0409   WBC 7.25 6.63 4.26   HGB 11.6* 10.6* 9.5*   HCT 41.2 33.3* 30.2*   * 119* 95*       All pertinent labs results from the past 24 hours have been reviewed.    Significant Imaging:  All pertinent imaging results/findings from the past 24 hours have been reviewed.      Assessment and Plan:     Hydronephrosis    73 year old female with extensive medical history and mild/moderate right hydroureteronephrosis and mild proximal left hydronephrosis with distended bladder on CT. PVR is within normal limits  however. Renal function within normal limits.    - No indication for kam catheter or CIC at this time  - Discussed behavioral voiding such as double voiding and timed voiding  - continue to record strict I/Os with PVRs after, straight cath for volumes >200.  - continue other current urologic management at this time  - treat + urine culture  - follow up with Dr. Valenzuela outpatient         VTE Risk Mitigation (From admission, onward)        Ordered     Place JENNIFER hose  Until discontinued      11/15/18 1920     Place sequential compression device  Until discontinued      11/15/18 1920     IP VTE HIGH RISK PATIENT  Once      11/15/18 1920        Danyel Rosas MD  Urology  [facility]    It is curious because previous imaging did not show an enlarged bladder with the ? Hydronephrosis.  However, she appears to empty well.  Will plan to follow up in clinic, reassess symptoms and check a catheterized PVR then.

## 2018-11-17 NOTE — ASSESSMENT & PLAN NOTE
- not SMA syndrome per vascular surgery  - EGD complete and negative  - continue BID PPI  - BID dicyclomine per GI recs  - discomfort improved at this time  - patient had distended bladder on imaging with history of urinary retention but voiding well with low post void residuals; questionable if distended bladder was causing some discomfort that could be contributing to her symptoms along with the UTI?  - continue to monitor

## 2018-11-17 NOTE — ASSESSMENT & PLAN NOTE
- this has been evaluated by vascular surgery and imaging findings not concerning for SMA  - no further work up warranted

## 2018-11-17 NOTE — HPI
Anayeli Judd is a 73 y.o. female with a history of colon cancer s/p resection complicated by multiple SBO requiring SB resections, type II diabetes mellitus with mild lower extremity neuropathy (A1c 5.7%), hypothyroidism, GERD, and multiple voiding issues. She presented to [facility] for worsening abdominal pain onset 2 weeks ago. Pain is mostly epigastric that radiates to suprapubic. She is currently not having significant pain. She has had associated nausea without emesis.  She denies significant daytime urgency or frequency, and reports nocturia x2. She denies dysuria or hematuria. She denies fever, chills.    She has had multiple slings placed in the past for MAYKEL, unsure what type. Most recently placed in 2005 in [place]. She has had 4 vaginal deliveries and underwent hysterectomy over 50 years ago for abnormal pap smear. She has been managed with prophylactic antibiotics for recurrent UTIs. Her MAYKEL is minimal but has been persistent and has been managed more recently with a pessary more recently. She reports occasional incomplete emptying.

## 2018-11-17 NOTE — ASSESSMENT & PLAN NOTE
- continue ceftriaxone for now until ucx from 11/15 results  - noted pa-sensitive enterobacter on cultures from 11/14  - dose of diflucan x1 dose as patient feels like she is having a yeast infection which she states happens frequently when she has a UTI

## 2018-11-17 NOTE — ASSESSMENT & PLAN NOTE
73 year old female with extensive medical history and mild/moderate right hydroureteronephrosis and mild proximal left hydronephrosis with distended bladder on CT. PVR is within normal limits however. Renal function within normal limits.    - No indication for kam catheter or CIC at this time  - Discussed behavioral voiding such as double voiding and timed voiding  - continue to record strict I/Os with PVRs after, straight cath for volumes >200.  - continue other current urologic management at this time  - follow up with Dr. Valenzuela outpatient

## 2018-11-17 NOTE — SUBJECTIVE & OBJECTIVE
Interval History: reports feeling better; not complaining of significant abdominal discomfort like before; low post void residuals <100; feels like she is getting a yeast infection     Review of Systems   Constitutional: Negative for fatigue and fever.   Respiratory: Negative for shortness of breath.    Cardiovascular: Negative for chest pain and palpitations.   Gastrointestinal: Negative for abdominal pain, diarrhea and nausea.   Genitourinary: Negative for difficulty urinating.     Objective:     Vital Signs (Most Recent):  Temp: 97.9 °F (36.6 °C) (11/17/18 1505)  Pulse: 62 (11/17/18 1505)  Resp: 16 (11/17/18 1505)  BP: (!) 104/57 (11/17/18 1505)  SpO2: 100 % (11/17/18 1505) Vital Signs (24h Range):  Temp:  [97.6 °F (36.4 °C)-98.5 °F (36.9 °C)] 97.9 °F (36.6 °C)  Pulse:  [55-67] 62  Resp:  [16-18] 16  SpO2:  [94 %-100 %] 100 %  BP: ()/(52-61) 104/57     Weight: 65.8 kg (145 lb 0 oz)  Body mass index is 26.52 kg/m².    Intake/Output Summary (Last 24 hours) at 11/17/2018 1651  Last data filed at 11/17/2018 1250  Gross per 24 hour   Intake 480 ml   Output 2990 ml   Net -2510 ml      Physical Exam   Constitutional: She is oriented to person, place, and time. She appears well-developed and well-nourished. No distress.   Cardiovascular: Normal rate.   Pulmonary/Chest: Effort normal.   Abdominal: Soft. She exhibits no distension. There is no tenderness.   Musculoskeletal: She exhibits no edema.   Neurological: She is alert and oriented to person, place, and time.   Vitals reviewed.      Significant Labs: All pertinent labs within the past 24 hours have been reviewed.    Significant Imaging: I have reviewed all pertinent imaging results/findings within the past 24 hours.

## 2018-11-18 LAB
ANION GAP SERPL CALC-SCNC: 8 MMOL/L
BASOPHILS # BLD AUTO: 0.05 K/UL
BASOPHILS NFR BLD: 1.2 %
BUN SERPL-MCNC: 8 MG/DL
CALCIUM SERPL-MCNC: 8.8 MG/DL
CHLORIDE SERPL-SCNC: 111 MMOL/L
CO2 SERPL-SCNC: 22 MMOL/L
CREAT SERPL-MCNC: 0.9 MG/DL
DIFFERENTIAL METHOD: ABNORMAL
EOSINOPHIL # BLD AUTO: 0.2 K/UL
EOSINOPHIL NFR BLD: 4 %
ERYTHROCYTE [DISTWIDTH] IN BLOOD BY AUTOMATED COUNT: 16.7 %
EST. GFR  (AFRICAN AMERICAN): >60 ML/MIN/1.73 M^2
EST. GFR  (NON AFRICAN AMERICAN): >60 ML/MIN/1.73 M^2
GLUCOSE SERPL-MCNC: 139 MG/DL
HCT VFR BLD AUTO: 32.1 %
HGB BLD-MCNC: 9.5 G/DL
IMM GRANULOCYTES # BLD AUTO: 0.07 K/UL
IMM GRANULOCYTES NFR BLD AUTO: 1.7 %
LYMPHOCYTES # BLD AUTO: 1.3 K/UL
LYMPHOCYTES NFR BLD: 33.2 %
MAGNESIUM SERPL-MCNC: 1.6 MG/DL
MCH RBC QN AUTO: 27.4 PG
MCHC RBC AUTO-ENTMCNC: 29.6 G/DL
MCV RBC AUTO: 93 FL
MONOCYTES # BLD AUTO: 0.5 K/UL
MONOCYTES NFR BLD: 12.4 %
NEUTROPHILS # BLD AUTO: 1.9 K/UL
NEUTROPHILS NFR BLD: 47.5 %
NRBC BLD-RTO: 0 /100 WBC
PHOSPHATE SERPL-MCNC: 3.1 MG/DL
PLATELET # BLD AUTO: 137 K/UL
PMV BLD AUTO: 12 FL
POCT GLUCOSE: 101 MG/DL (ref 70–110)
POCT GLUCOSE: 118 MG/DL (ref 70–110)
POCT GLUCOSE: 129 MG/DL (ref 70–110)
POTASSIUM SERPL-SCNC: 3.9 MMOL/L
RBC # BLD AUTO: 3.47 M/UL
SODIUM SERPL-SCNC: 141 MMOL/L
WBC # BLD AUTO: 4.04 K/UL

## 2018-11-18 PROCEDURE — 36415 COLL VENOUS BLD VENIPUNCTURE: CPT

## 2018-11-18 PROCEDURE — 63600175 PHARM REV CODE 636 W HCPCS: Performed by: PHYSICIAN ASSISTANT

## 2018-11-18 PROCEDURE — 99232 SBSQ HOSP IP/OBS MODERATE 35: CPT | Mod: ,,, | Performed by: HOSPITALIST

## 2018-11-18 PROCEDURE — 11000001 HC ACUTE MED/SURG PRIVATE ROOM

## 2018-11-18 PROCEDURE — 85025 COMPLETE CBC W/AUTO DIFF WBC: CPT

## 2018-11-18 PROCEDURE — 25000003 PHARM REV CODE 250: Performed by: PHYSICIAN ASSISTANT

## 2018-11-18 PROCEDURE — 25000003 PHARM REV CODE 250: Performed by: HOSPITALIST

## 2018-11-18 PROCEDURE — 84100 ASSAY OF PHOSPHORUS: CPT

## 2018-11-18 PROCEDURE — 80048 BASIC METABOLIC PNL TOTAL CA: CPT

## 2018-11-18 PROCEDURE — 83735 ASSAY OF MAGNESIUM: CPT

## 2018-11-18 RX ORDER — CIPROFLOXACIN 500 MG/1
500 TABLET ORAL EVERY 12 HOURS
Status: DISCONTINUED | OUTPATIENT
Start: 2018-11-18 | End: 2018-11-19 | Stop reason: HOSPADM

## 2018-11-18 RX ORDER — LANOLIN ALCOHOL/MO/W.PET/CERES
400 CREAM (GRAM) TOPICAL 2 TIMES DAILY
Status: COMPLETED | OUTPATIENT
Start: 2018-11-18 | End: 2018-11-18

## 2018-11-18 RX ORDER — CIPROFLOXACIN 500 MG/1
500 TABLET ORAL EVERY 12 HOURS
Qty: 24 TABLET | Refills: 0 | Status: SHIPPED | OUTPATIENT
Start: 2018-11-18 | End: 2018-11-30

## 2018-11-18 RX ORDER — FLUCONAZOLE 150 MG/1
150 TABLET ORAL DAILY
Status: DISCONTINUED | OUTPATIENT
Start: 2018-11-18 | End: 2018-11-19 | Stop reason: HOSPADM

## 2018-11-18 RX ORDER — INSULIN ASPART 100 [IU]/ML
2 INJECTION, SOLUTION INTRAVENOUS; SUBCUTANEOUS
Status: DISCONTINUED | OUTPATIENT
Start: 2018-11-18 | End: 2018-11-19 | Stop reason: HOSPADM

## 2018-11-18 RX ADMIN — ASPIRIN 81 MG CHEWABLE TABLET 81 MG: 81 TABLET CHEWABLE at 10:11

## 2018-11-18 RX ADMIN — RIFAXIMIN 200 MG: 200 TABLET ORAL at 10:11

## 2018-11-18 RX ADMIN — GABAPENTIN 1200 MG: 400 CAPSULE ORAL at 10:11

## 2018-11-18 RX ADMIN — PANTOPRAZOLE SODIUM 40 MG: 40 TABLET, DELAYED RELEASE ORAL at 10:11

## 2018-11-18 RX ADMIN — LEVOTHYROXINE SODIUM 100 MCG: 0.1 TABLET ORAL at 06:11

## 2018-11-18 RX ADMIN — DICYCLOMINE HYDROCHLORIDE 10 MG: 10 CAPSULE ORAL at 10:11

## 2018-11-18 RX ADMIN — MAGNESIUM OXIDE TAB 400 MG (241.3 MG ELEMENTAL MG) 400 MG: 400 (241.3 MG) TAB at 10:11

## 2018-11-18 RX ADMIN — RAMELTEON 8 MG: 8 TABLET, FILM COATED ORAL at 10:11

## 2018-11-18 RX ADMIN — CIPROFLOXACIN 500 MG: 500 TABLET, FILM COATED ORAL at 01:11

## 2018-11-18 RX ADMIN — ATORVASTATIN CALCIUM 40 MG: 20 TABLET, FILM COATED ORAL at 10:11

## 2018-11-18 RX ADMIN — FLUCONAZOLE 150 MG: 150 TABLET ORAL at 05:11

## 2018-11-18 RX ADMIN — ACETAMINOPHEN 650 MG: 650 SOLUTION ORAL at 08:11

## 2018-11-18 RX ADMIN — INSULIN DETEMIR 10 UNITS: 100 INJECTION, SOLUTION SUBCUTANEOUS at 10:11

## 2018-11-18 RX ADMIN — CIPROFLOXACIN 500 MG: 500 TABLET, FILM COATED ORAL at 10:11

## 2018-11-18 RX ADMIN — PSYLLIUM HUSK 1 PACKET: 3.4 POWDER ORAL at 10:11

## 2018-11-18 NOTE — SUBJECTIVE & OBJECTIVE
Interval History: c/o some mild abdominal discomfort this am and improved after receiving am dose of bentyl; reporting some loose stools     Review of Systems   Constitutional: Negative for fatigue and fever.   Respiratory: Negative for shortness of breath.    Cardiovascular: Negative for chest pain and palpitations.   Gastrointestinal: Positive for abdominal pain (as above) and diarrhea. Negative for nausea.   Genitourinary: Negative for difficulty urinating.     Objective:     Vital Signs (Most Recent):  Temp: 96.8 °F (36 °C) (11/18/18 1131)  Pulse: (!) 51 (11/18/18 1131)  Resp: 17 (11/18/18 1131)  BP: 107/68 (11/18/18 1131)  SpO2: 96 % (11/18/18 1131) Vital Signs (24h Range):  Temp:  [96.6 °F (35.9 °C)-98.2 °F (36.8 °C)] 96.8 °F (36 °C)  Pulse:  [51-67] 51  Resp:  [17-20] 17  SpO2:  [96 %-97 %] 96 %  BP: ()/(55-68) 107/68     Weight: 65.8 kg (145 lb 0 oz)  Body mass index is 26.52 kg/m².    Intake/Output Summary (Last 24 hours) at 11/18/2018 1605  Last data filed at 11/18/2018 1325  Gross per 24 hour   Intake 1050 ml   Output --   Net 1050 ml      Physical Exam   Constitutional: She appears well-developed and well-nourished. No distress.   Cardiovascular: Normal rate.   Pulmonary/Chest: Effort normal.   Abdominal: Soft. She exhibits no distension. There is no tenderness.   Musculoskeletal: She exhibits no edema.   Neurological: She is alert.   Vitals reviewed.      Significant Labs: All pertinent labs within the past 24 hours have been reviewed.    Significant Imaging: I have reviewed all pertinent imaging results/findings within the past 24 hours.

## 2018-11-18 NOTE — TREATMENT PLAN
GI Treatment Plan    Anayeli Judd is a 73 y.o. female admitted to hospital 11/15/2018 (Hospital Day: 4) due to Abdominal pain.     Interval History  - patient seen, daughter accompanying  - feeling improved since hospitalization, noted that her appetite improved and able to tolerate breakfast and lunch without significant discomfort  - moving bowels only with PO intake currently  - abdominal pain improved, denies other complaints or concerns    Objective  Temp:  [96.6 °F (35.9 °C)-98.2 °F (36.8 °C)] 98.2 °F (36.8 °C) (11/18 0829)  Pulse:  [53-67] 53 (11/18 0829)  BP: ()/(55-57) 122/55 (11/18 0829)  Resp:  [16-20] 18 (11/18 0829)  SpO2:  [97 %-100 %] 97 % (11/18 0829)    General: Alert, Oriented x3, no distress  Abdomen: Normoactive bowel sounds. Non-distended. Normal tympany. Soft. Non-tender. No peritoneal signs.    Laboratory    Recent Labs   Lab 11/16/18  0409 11/17/18  0339 11/18/18  0443   HGB 9.5* 9.0* 9.5*       Lab Results   Component Value Date    WBC 4.04 11/18/2018    HGB 9.5 (L) 11/18/2018    HCT 32.1 (L) 11/18/2018    MCV 93 11/18/2018     (L) 11/18/2018       Lab Results   Component Value Date     11/18/2018    K 3.9 11/18/2018     (H) 11/18/2018    CO2 22 (L) 11/18/2018    BUN 8 11/18/2018    CREATININE 0.9 11/18/2018    CALCIUM 8.8 11/18/2018    ANIONGAP 8 11/18/2018    ESTGFRAFRICA >60.0 11/18/2018    EGFRNONAA >60.0 11/18/2018       Lab Results   Component Value Date    ALT 25 11/15/2018    AST 33 11/15/2018    ALKPHOS 51 (L) 11/15/2018    BILITOT 0.7 11/15/2018       Lab Results   Component Value Date    INR 1.0 05/24/2018    INR 1.0 08/26/2017    INR 1.2 04/19/2017       Plan  - improving abdominal pain, now able to tolerate PO intake. Etiology remains unclear. Negative mesenteric ischemia workup. Urology following for hydronephrosis and bladder distension. On antibiotics for UTI.  - continue bentyl  - can complete course of rifaximin for SIBO  - Plan of care was  discussed with primary team.  - We are signing-off. Please call with any questions.   - can follow-up as outpatient in GI clinic (requested follow-up)    Thank you for involving us in the care of Anayeli Judd. Please call with any additional questions, concerns or changes in the patient's clinical status.    Guerrero Betts MD  Gastroenterology Fellow, PGY IV  Pager: [phone]

## 2018-11-18 NOTE — ASSESSMENT & PLAN NOTE
- home mirabegron not on formulary, patient may take home med if brings it in  - appreciate urology consult; no further recommendations at this time  - good post void residuals  - follow up with Dr. Valenzuela on dc; referral placed

## 2018-11-18 NOTE — PROGRESS NOTES
[facility] Hospital Medicine  Progress Note    Patient Name: Anayeli Judd  MRN: [id number]  Patient Class: IP- Inpatient   Admission Date: 11/15/2018  Length of Stay: 2 days  Attending Physician: Danelle Noe MD  Primary Care Provider: Rocio Mc MD    [facility] Medicine Team: [facility] HOSP MED  Danelle Noe MD    Subjective:     Principal Problem:Abdominal pain    HPI:  Anayeli Judd is a 73F with history of CRC, multiple admissions for SBO, IBS, microscopic colitis, HLD, T2DM, GERD, Hypothyroidism,  incompelte bladder emptying, frequent UTIs, who is presenting for evaluation of abdominal pain. She reports off and on abdominal pain for the last 2 weeks, usually worse 30 min - 1 hour after eating. Pain is epigastric that radiates to suprapubic, it is sharp and extreme. It spontaneously resolves with time. She was seen in the ED 11/14 for these complaints and d/c'd home after negative work up including CT A/P which showed hydronephrosis and a LA of 2.5.    She then presented to GI clinic the next day with worsening of abdominal pain. Associated with nausea, no emesis. She will have normal BM in the AM, followed by diarrhea in the day, no blood. She recently completed a 3 month course of keflex for chronic UTIs and she denies any urinary symptoms and reports frequency, but not dysuria. It was recommended she present to the ED from GI clinic. She denies fever, chills, CP, SOB, HA.    ED: CBC WNL, LA WNL, UA+, troponin and EKG negative, KUB negative.    Hospital Course:  No notes on file    Interval History: c/o some mild abdominal discomfort this am and improved after receiving am dose of bentyl; reporting some loose stools     Review of Systems   Constitutional: Negative for fatigue and fever.   Respiratory: Negative for shortness of breath.    Cardiovascular: Negative for chest pain and palpitations.   Gastrointestinal: Positive for abdominal pain (as above) and diarrhea.  Negative for nausea.   Genitourinary: Negative for difficulty urinating.     Objective:     Vital Signs (Most Recent):  Temp: 96.8 °F (36 °C) (11/18/18 1131)  Pulse: (!) 51 (11/18/18 1131)  Resp: 17 (11/18/18 1131)  BP: 107/68 (11/18/18 1131)  SpO2: 96 % (11/18/18 1131) Vital Signs (24h Range):  Temp:  [96.6 °F (35.9 °C)-98.2 °F (36.8 °C)] 96.8 °F (36 °C)  Pulse:  [51-67] 51  Resp:  [17-20] 17  SpO2:  [96 %-97 %] 96 %  BP: ()/(55-68) 107/68     Weight: 65.8 kg (145 lb 0 oz)  Body mass index is 26.52 kg/m².    Intake/Output Summary (Last 24 hours) at 11/18/2018 1605  Last data filed at 11/18/2018 1325  Gross per 24 hour   Intake 1050 ml   Output --   Net 1050 ml      Physical Exam   Constitutional: She appears well-developed and well-nourished. No distress.   Cardiovascular: Normal rate.   Pulmonary/Chest: Effort normal.   Abdominal: Soft. She exhibits no distension. There is no tenderness.   Musculoskeletal: She exhibits no edema.   Neurological: She is alert.   Vitals reviewed.      Significant Labs: All pertinent labs within the past 24 hours have been reviewed.    Significant Imaging: I have reviewed all pertinent imaging results/findings within the past 24 hours.    Assessment/Plan:      * Abdominal pain    - not SMA syndrome per vascular surgery  - EGD complete and negative  - continue BID PPI  - BID dicyclomine per GI recs  - patient has also been on rifaximin as a trial or possible small intestinal bacterial overgrowth (SIBO)  - recommended course for rifaximin for SIBO is about 14 days; will continue trial   - discomfort improved   - patient had distended bladder on imaging with history of urinary retention but voiding well with low post void residuals; questionable if distended bladder was causing some discomfort that could be contributing to her symptoms along with the UTI?--> discussed with urology and will need to follow up with Dr. Valenzuela   - continue to monitor      Acute cystitis without  hematuria    - per uti        Hydronephrosis    - incidentally seen on CT from prior ED visit  - no stones, h/o of incomplete bladder emptying but having good post void residuals  - appreciate urology consult  - patient will follow up with urology upon dc  - no further urologic work up warranted at this time  - renal function WNL      Hypomagnesemia    - replete prn      Frequent UTI    - urine cultures from 11/14 and 11/15 both growing pan sensitive enterobacter  - dc ceftriaxone and start cipro and treat for a total of 14 days; given history of incontience and recurrent uti's, will treat for complicated UTI  - given a dose of diflucan x 1 on 11/17 as patient reported yeast-like symptoms which she commonly experiences with UTIs, but still feels like symptoms are present; will prescribe a 5 day course of diflucan      Mixed stress and urge urinary incontinence    - home mirabegron not on formulary, patient may take home med if brings it in  - appreciate urology consult; no further recommendations at this time  - good post void residuals  - follow up with Dr. Valenzuela on dc; referral placed      Type 2 diabetes mellitus without complication, without long-term current use of insulin    - hold oral hypoglycemic agents  - home insulin: detemir 12U nightly,   - hospital insulin: detemir 10 U nightly, low dose SSI  - scheduled 2 units of aspart   - diabetic diet      GERD (gastroesophageal reflux disease)    - PPI increased to BID     Diarrhea    - check c-diff  - if negative then can consider imodium         VTE Risk Mitigation (From admission, onward)        Ordered     Place JENNIFER hose  Until discontinued      11/15/18 1920     Place sequential compression device  Until discontinued      11/15/18 1920     IP VTE HIGH RISK PATIENT  Once      11/15/18 1920              Danelle Noe MD  Department of Hospital Medicine   [facility]

## 2018-11-18 NOTE — NURSING
Spoke with Dr. Noe.  Per provider, OK to remove PIV from R foot.  OK to order OK no IV access order with telephone read back.

## 2018-11-18 NOTE — PLAN OF CARE
Problem: Patient Care Overview  Goal: Plan of Care Review  Outcome: Ongoing (interventions implemented as appropriate)   11/18/18 0511   Coping/Psychosocial   Plan Of Care Reviewed With patient       Problem: Fall Risk (Adult)  Goal: Absence of Falls  Patient will demonstrate the desired outcomes by discharge/transition of care.  Outcome: Ongoing (interventions implemented as appropriate)   11/18/18 0511   Fall Risk (Adult)   Absence of Falls making progress toward outcome

## 2018-11-18 NOTE — ASSESSMENT & PLAN NOTE
- hold oral hypoglycemic agents  - home insulin: detemir 12U nightly,   - hospital insulin: detemir 10 U nightly, low dose SSI  - scheduled 2 units of aspart   - diabetic diet

## 2018-11-18 NOTE — ASSESSMENT & PLAN NOTE
- urine cultures from 11/14 and 11/15 both growing pan sensitive enterobacter  - dc ceftriaxone and start cipro and treat for a total of 14 days; given history of incontience and recurrent uti's, will treat for complicated UTI  - given a dose of diflucan x 1 on 11/17 as patient reported yeast-like symptoms which she commonly experiences with UTIs, but still feels like symptoms are present; will prescribe a 5 day course of diflucan

## 2018-11-19 VITALS
SYSTOLIC BLOOD PRESSURE: 104 MMHG | WEIGHT: 145 LBS | HEART RATE: 58 BPM | OXYGEN SATURATION: 96 % | DIASTOLIC BLOOD PRESSURE: 54 MMHG | HEIGHT: 62 IN | BODY MASS INDEX: 26.68 KG/M2 | RESPIRATION RATE: 18 BRPM | TEMPERATURE: 98 F

## 2018-11-19 LAB
ANION GAP SERPL CALC-SCNC: 8 MMOL/L
BASOPHILS # BLD AUTO: 0.02 K/UL
BASOPHILS NFR BLD: 0.4 %
BUN SERPL-MCNC: 11 MG/DL
CALCIUM SERPL-MCNC: 8.7 MG/DL
CHLORIDE SERPL-SCNC: 110 MMOL/L
CO2 SERPL-SCNC: 23 MMOL/L
CREAT SERPL-MCNC: 0.8 MG/DL
DIFFERENTIAL METHOD: ABNORMAL
EOSINOPHIL # BLD AUTO: 0.2 K/UL
EOSINOPHIL NFR BLD: 4.5 %
ERYTHROCYTE [DISTWIDTH] IN BLOOD BY AUTOMATED COUNT: 15.9 %
EST. GFR  (AFRICAN AMERICAN): >60 ML/MIN/1.73 M^2
EST. GFR  (NON AFRICAN AMERICAN): >60 ML/MIN/1.73 M^2
GLUCOSE SERPL-MCNC: 136 MG/DL
HCT VFR BLD AUTO: 31.2 %
HGB BLD-MCNC: 9.6 G/DL
IMM GRANULOCYTES # BLD AUTO: 0.01 K/UL
IMM GRANULOCYTES NFR BLD AUTO: 0.2 %
LYMPHOCYTES # BLD AUTO: 1.6 K/UL
LYMPHOCYTES NFR BLD: 32.1 %
MAGNESIUM SERPL-MCNC: 1.5 MG/DL
MCH RBC QN AUTO: 26.2 PG
MCHC RBC AUTO-ENTMCNC: 30.8 G/DL
MCV RBC AUTO: 85 FL
MONOCYTES # BLD AUTO: 0.5 K/UL
MONOCYTES NFR BLD: 9.1 %
NEUTROPHILS # BLD AUTO: 2.6 K/UL
NEUTROPHILS NFR BLD: 53.7 %
NRBC BLD-RTO: 0 /100 WBC
PHOSPHATE SERPL-MCNC: 3 MG/DL
PLATELET # BLD AUTO: 96 K/UL
PMV BLD AUTO: 11.1 FL
POCT GLUCOSE: 136 MG/DL (ref 70–110)
POCT GLUCOSE: 137 MG/DL (ref 70–110)
POCT GLUCOSE: 170 MG/DL (ref 70–110)
POTASSIUM SERPL-SCNC: 3.9 MMOL/L
RBC # BLD AUTO: 3.66 M/UL
SODIUM SERPL-SCNC: 141 MMOL/L
WBC # BLD AUTO: 4.92 K/UL

## 2018-11-19 PROCEDURE — 99239 HOSP IP/OBS DSCHRG MGMT >30: CPT | Mod: ,,, | Performed by: HOSPITALIST

## 2018-11-19 PROCEDURE — 80048 BASIC METABOLIC PNL TOTAL CA: CPT

## 2018-11-19 PROCEDURE — 25000003 PHARM REV CODE 250: Performed by: PHYSICIAN ASSISTANT

## 2018-11-19 PROCEDURE — 63600175 PHARM REV CODE 636 W HCPCS: Performed by: HOSPITALIST

## 2018-11-19 PROCEDURE — 36415 COLL VENOUS BLD VENIPUNCTURE: CPT

## 2018-11-19 PROCEDURE — 25000003 PHARM REV CODE 250: Performed by: HOSPITALIST

## 2018-11-19 PROCEDURE — 83735 ASSAY OF MAGNESIUM: CPT

## 2018-11-19 PROCEDURE — 84100 ASSAY OF PHOSPHORUS: CPT

## 2018-11-19 PROCEDURE — 63600175 PHARM REV CODE 636 W HCPCS: Performed by: PHYSICIAN ASSISTANT

## 2018-11-19 PROCEDURE — 85025 COMPLETE CBC W/AUTO DIFF WBC: CPT

## 2018-11-19 RX ORDER — FLUCONAZOLE 100 MG/1
100 TABLET ORAL DAILY
Qty: 3 TABLET | Refills: 0 | Status: SHIPPED | OUTPATIENT
Start: 2018-11-20 | End: 2018-11-23

## 2018-11-19 RX ADMIN — ATORVASTATIN CALCIUM 40 MG: 20 TABLET, FILM COATED ORAL at 09:11

## 2018-11-19 RX ADMIN — DICYCLOMINE HYDROCHLORIDE 10 MG: 10 CAPSULE ORAL at 09:11

## 2018-11-19 RX ADMIN — RIFAXIMIN 200 MG: 200 TABLET ORAL at 09:11

## 2018-11-19 RX ADMIN — POLYETHYLENE GLYCOL 3350 17 G: 17 POWDER, FOR SOLUTION ORAL at 09:11

## 2018-11-19 RX ADMIN — PSYLLIUM HUSK 1 PACKET: 3.4 POWDER ORAL at 09:11

## 2018-11-19 RX ADMIN — INSULIN ASPART 2 UNITS: 100 INJECTION, SOLUTION INTRAVENOUS; SUBCUTANEOUS at 09:11

## 2018-11-19 RX ADMIN — ACETAMINOPHEN 650 MG: 650 SOLUTION ORAL at 09:11

## 2018-11-19 RX ADMIN — CIPROFLOXACIN 500 MG: 500 TABLET, FILM COATED ORAL at 09:11

## 2018-11-19 RX ADMIN — LEVOTHYROXINE SODIUM 100 MCG: 0.1 TABLET ORAL at 06:11

## 2018-11-19 RX ADMIN — FLUCONAZOLE 150 MG: 150 TABLET ORAL at 09:11

## 2018-11-19 RX ADMIN — PANTOPRAZOLE SODIUM 40 MG: 40 TABLET, DELAYED RELEASE ORAL at 09:11

## 2018-11-19 RX ADMIN — ASPIRIN 81 MG CHEWABLE TABLET 81 MG: 81 TABLET CHEWABLE at 09:11

## 2018-11-19 RX ADMIN — GABAPENTIN 1200 MG: 400 CAPSULE ORAL at 09:11

## 2018-11-19 NOTE — PLAN OF CARE
Problem: Patient Care Overview  Goal: Plan of Care Review  Outcome: Ongoing (interventions implemented as appropriate)  Pt A&O x 4.  VSS on room air.  Pt c/o generalized pain to abdomen, 3/10.  Pain managed with scheduled dicyclomine.  Pt with multiple loose stools over last day.  Pending C. Diff sample.  Special contact precautions maintained.  BG monitoring maintained.  Will continue to monitor pt.

## 2018-11-19 NOTE — ASSESSMENT & PLAN NOTE
- home mirabegron not on formulary, patient may take home med if brings it in  - appreciate urology consult; no further recommendations at this time  - good post void residuals  - follow up with Dr. Valenzuela on dc; referral placed     Future Appointments   Date Time Provider Department Center   1/30/2019 10:00 AM Blanka Blair NP [facility] UROGYN [facility]

## 2018-11-19 NOTE — NURSING
Contacted IM G, Pt's stool formed. Per provider ok to cancel contact precaution and C- diff stool sample.

## 2018-11-19 NOTE — ASSESSMENT & PLAN NOTE
- not SMA syndrome per vascular surgery  - EGD complete and negative  - continue PPI  - BID dicyclomine per GI recs  - patient has also been on rifaximin as a trial or possible small intestinal bacterial overgrowth (SIBO)  - recommended course for rifaximin for SIBO is about 14 days; will continue trial   - discomfort improved   - patient had distended bladder on imaging with history of urinary retention but voiding well with low post void residuals; questionable if distended bladder was causing some discomfort that could be contributing to her symptoms along with the UTI?--> discussed with urology and will need to follow up with Dr. Valenzuela   - continue to monitor

## 2018-11-19 NOTE — PLAN OF CARE
Problem: Patient Care Overview  Goal: Plan of Care Review  Outcome: Ongoing (interventions implemented as appropriate)   11/19/18 0500   Coping/Psychosocial   Plan Of Care Reviewed With patient       Problem: Fall Risk (Adult)  Goal: Absence of Falls  Patient will demonstrate the desired outcomes by discharge/transition of care.  Outcome: Ongoing (interventions implemented as appropriate)   11/19/18 0500   Fall Risk (Adult)   Absence of Falls making progress toward outcome

## 2018-11-19 NOTE — DISCHARGE SUMMARY
[facility] Hospital Medicine  Discharge Summary      Patient Name: Anayeli Judd  MRN: [id number]  Admission Date: 11/15/2018  Hospital Length of Stay: 3 days  Discharge Date and Time:  11/19/2018 10:00 AM  Attending Physician: Stewart Noe MD   Discharging Provider: Stewart Velazquez MD  Primary Care Provider: Rocio Mc MD  [facility] Medicine Team: [facility] MED  Stewart Velazquez MD    HPI:   Anayeli Judd is a 73F with history of CRC, multiple admissions for SBO, IBS, microscopic colitis, HLD, T2DM, GERD, Hypothyroidism,  incompelte bladder emptying, frequent UTIs, who is presenting for evaluation of abdominal pain. She reports off and on abdominal pain for the last 2 weeks, usually worse 30 min - 1 hour after eating. Pain is epigastric that radiates to suprapubic, it is sharp and extreme. It spontaneously resolves with time. She was seen in the ED 11/14 for these complaints and d/c'd home after negative work up including CT A/P which showed hydronephrosis and a LA of 2.5.    She then presented to GI clinic the next day with worsening of abdominal pain. Associated with nausea, no emesis. She will have normal BM in the AM, followed by diarrhea in the day, no blood. She recently completed a 3 month course of keflex for chronic UTIs and she denies any urinary symptoms and reports frequency, but not dysuria. It was recommended she present to the ED from GI clinic. She denies fever, chills, CP, SOB, HA.    ED: CBC WNL, LA WNL, UA+, troponin and EKG negative, KUB negative.    Procedure(s) (LRB):  EGD (ESOPHAGOGASTRODUODENOSCOPY) (N/A)      Hospital Course:   11/19: Diarrhea improved. 2 BM over 24 hours and are formed. Unable to send to lab to check for C.diff because stool is formed. Pain controlled. Tolerating diet and eating 100% of breakfast.       Consults:   Consults (From admission, onward)        Status Ordering Provider     Inpatient consult to Gastroenterology  Once     Provider:   (Not yet assigned)    Completed ILIA PIERCE     Inpatient consult to Vascular Surgery  Once     Provider:  (Not yet assigned)    Completed FAIZAN CHO          * Abdominal pain    - not SMA syndrome per vascular surgery  - EGD complete and negative  - continue PPI  - BID dicyclomine per GI recs  - patient has also been on rifaximin as a trial or possible small intestinal bacterial overgrowth (SIBO)  - recommended course for rifaximin for SIBO is about 14 days; will continue trial   - discomfort improved   - patient had distended bladder on imaging with history of urinary retention but voiding well with low post void residuals; questionable if distended bladder was causing some discomfort that could be contributing to her symptoms along with the UTI?--> discussed with urology and will need to follow up with Dr. Valenzuela   - continue to monitor      Acute cystitis without hematuria    - per uti        Hydronephrosis    - incidentally seen on CT from prior ED visit  - no stones, h/o of incomplete bladder emptying but having good post void residuals  - appreciate urology consult  - patient will follow up with urology upon dc  - no further urologic work up warranted at this time  - renal function WNL      Hypomagnesemia    - replete prn      Frequent UTI    - urine cultures from 11/14 and 11/15 both growing pan sensitive enterobacter  - dc ceftriaxone and start cipro and treat for a total of 14 days; given history of incontience and recurrent uti's, will treat for complicated UTI  - given a dose of diflucan x 1 on 11/17 as patient reported yeast-like symptoms which she commonly experiences with UTIs, but still feels like symptoms are present; will prescribe a 5 day course of diflucan      Mixed stress and urge urinary incontinence    - home mirabegron not on formulary, patient may take home med if brings it in  - appreciate urology consult; no further recommendations at this time  - good post void residuals  -  follow up with Dr. Valenzuela on NH; referral placed     Future Appointments   Date Time Provider Department Center   1/30/2019 10:00 AM Blanka Blair NP [facility] UROGYN [facility]          Type 2 diabetes mellitus without complication, without long-term current use of insulin    - hold oral hypoglycemic agents  - home insulin: detemir 12U nightly,   - hospital insulin: detemir 10 U nightly, low dose SSI  - scheduled 2 units of aspart   - diabetic diet      GERD (gastroesophageal reflux disease)    - PPI      Diarrhea    - Resolved. Having formed stool. No need to check c-diff         Final Active Diagnoses:    Diagnosis Date Noted POA    PRINCIPAL PROBLEM:  Abdominal pain [R10.9] 04/17/2018 Yes    Acute cystitis without hematuria [N30.00] 11/16/2018 Yes    Hypomagnesemia [E83.42] 11/15/2018 No    Hydronephrosis [N13.30] 11/15/2018 Yes    Frequent UTI [N39.0] 06/30/2018 Yes    Mixed stress and urge urinary incontinence [N39.46] 06/30/2018 Yes    Type 2 diabetes mellitus without complication, without long-term current use of insulin [E11.9] 05/05/2015 Yes     Chronic    GERD (gastroesophageal reflux disease) [K21.9]  Yes    Diarrhea [R19.7] 04/08/2013 Yes      Problems Resolved During this Admission:    Diagnosis Date Noted Date Resolved POA    Hyponatremia [E87.1] 11/15/2018 11/17/2018 Yes    Chronic kidney disease, stage III (moderate) [N18.3] 11/15/2018 11/17/2018 Yes    Anemia [D64.9] 11/15/2018 11/17/2018 Yes    Superior mesenteric artery stenosis [I77.1] 11/15/2018 11/17/2018 Yes       Discharged Condition: good    Disposition: Home or Self Care    Follow Up:  Follow-up Information     Schedule an appointment as soon as possible for a visit with Viridiana Valenzuela MD.    Specialty:  Urology  Why:  hospital follow up  Contact information:  [address]  [place] [address]  [phone]             Rocio Mc MD In 1 week.    Specialty:  Internal Medicine  Why:  [facility] follow [facility] and  [facility]   Contact information:  [address]  [address]  [facility] [address]  [phone]                 Patient Instructions:      Ambulatory Referral to Urology   Referral Priority: Routine Referral Type: Consultation   Referral Reason: Specialty Services Required   Requested Specialty: Urology   Number of Visits Requested: 1     Ambulatory Referral to Urology   Referral Priority: Routine Referral Type: Consultation   Referral Reason: Specialty Services Required   Requested Specialty: Urology   Number of Visits Requested: 1     Ambulatory Referral to Gastroenterology   Referral Priority: Routine Referral Type: Consultation   Referral Reason: Specialty Services Required   Requested Specialty: Gastroenterology   Number of Visits Requested: 1     Diet diabetic     Notify your health care provider if you experience any of the following:  persistent nausea and vomiting or diarrhea     Activity as tolerated       Significant Diagnostic Studies: Labs:   BMP:   Recent Labs   Lab 11/18/18 0443 11/19/18 0518   * 136*    141   K 3.9 3.9   * 110   CO2 22* 23   BUN 8 11   CREATININE 0.9 0.8   CALCIUM 8.8 8.7   MG 1.6 1.5*    and CMP   Recent Labs   Lab 11/18/18 0443 11/19/18 0518    141   K 3.9 3.9   * 110   CO2 22* 23   * 136*   BUN 8 11   CREATININE 0.9 0.8   CALCIUM 8.8 8.7   ANIONGAP 8 8   ESTGFRAFRICA >60.0 >60.0   EGFRNONAA >60.0 >60.0       Pending Diagnostic Studies:     None         Medications:  Reconciled Home Medications:      Medication List      START taking these medications    ciprofloxacin HCl 500 MG tablet  Commonly known as:  CIPRO  Take 1 tablet (500 mg total) by mouth every 12 (twelve) hours. for 12 days     fluconazole 100 MG tablet  Commonly known as:  DIFLUCAN  Take 1 tablet (100 mg total) by mouth once daily. for 3 days  Start taking on:  11/20/2018     rifAXImin 200 mg Tab  Commonly known as:  XIFAXAN  Take 1 tablet (200 mg total) by  "mouth 2 (two) times daily. for 12 days        CHANGE how you take these medications    insulin glargine 100 unit/mL (3 mL) Inpn pen  Commonly known as:  LANTUS SOLOSTAR U-100 INSULIN  Inject 10 Units into the skin every evening.  What changed:  how much to take        CONTINUE taking these medications    atorvastatin 40 MG tablet  Commonly known as:  LIPITOR  Take 1 tablet (40 mg total) by mouth once daily.     azelastine 137 mcg (0.1 %) nasal spray  Commonly known as:  ASTELIN  1 spray (137 mcg total) by Nasal route 2 (two) times daily.     betamethasone dipropionate 0.05 % ointment  Commonly known as:  DIPROLENE  Apply every Am x 2 weeks then twice weekly     blood sugar diagnostic Strp  Dispense Ryan contour strips; test blood sugar once daily     blood-glucose meter kit  Commonly known as:  CONTOUR METER  Please dispense [email or web address] Contour meter and supplies Use as instructed Test Blood sugar once daily     conjugated estrogens vaginal cream  Commonly known as:  PREMARIN  Place 0.5 g vaginally every evening.     diphenhydrAMINE 25 mg capsule  Commonly known as:  BENADRYL  Take 1 each (25 mg total) by mouth nightly as needed for Itching, Allergies or Insomnia.     fluticasone 50 mcg/actuation nasal spray  Commonly known as:  FLONASE  2 sprays by Each Nare route once daily.     FREESTYLE EFREN 10 DAY READER Misc  Generic drug:  flash glucose scanning reader  TEST as directed     FREESTYLE EFREN 10 DAY SENSOR Kit  Generic drug:  flash glucose sensor     gabapentin 600 MG tablet  Commonly known as:  NEURONTIN  Take 1,200 mg by mouth 2 (two) times daily.     hydrocortisone 2.5 % cream     ICAPS AREDS ORAL  Take by mouth 2 (two) times daily.     insulin syringe-needle U-100 0.3 mL 31 gauge x 15/64" Syrg  1 Syringe by Misc.(Non-Drug; Combo Route) route once daily.     ipratropium 42 mcg (0.06 %) nasal spray  Commonly known as:  ATROVENT     lancets Misc  Commonly known as:  ONETOUCH ULTRASOFT LANCETS  Test blood sugar once " daily     levothyroxine 100 MCG tablet  Commonly known as:  SYNTHROID  TAKE 1 TABLET BY MOUTH EVERY MORNING     linagliptin 5 mg Tab tablet  Commonly known as:  TRADJENTA  Take 1 tablet (5 mg total) by mouth once daily.     magnesium 30 mg Tab  Take 500 capsules by mouth. Patient's taking magnesium 500mg QD     meloxicam 15 MG tablet  Commonly known as:  MOBIC  TAKE 1 TABLET BY MOUTH DAILY WITH FOOD AS NEEDED FOR PAIN OR ARTHRITIS     metFORMIN 750 MG 24 hr tablet  Commonly known as:  GLUCOPHAGE-XR  Take 1 tablet (750 mg total) by mouth 2 (two) times daily with meals.     metronidazole 0.75% 0.75 % Crea  Commonly known as:  METROCREAM     mirabegron 50 mg Tb24  Commonly known as:  MYRBETRIQ  Take 1 tablet (50 mg total) by mouth once daily.     ondansetron 4 MG tablet  Commonly known as:  ZOFRAN  Take 1 tablet (4 mg total) by mouth every 8 (eight) hours as needed for Nausea.     ospemifene 60 mg Tab  Commonly known as:  OSPHENA  Take 60 mg by mouth once daily.     pantoprazole 40 MG tablet  Commonly known as:  PROTONIX  TAKE 1 TABLET BY MOUTH ONCE DAILY     polyethylene glycol 17 gram/dose powder  Commonly known as:  GLYCOLAX  Take 17 g by mouth once daily.     triamcinolone acetonide 0.1% 0.1 % paste  Commonly known as:  KENALOG  apply to affected area with A Q-TIP three times a day     vitamin D 1000 units Tab  Commonly known as:  VITAMIN D3  Take 185 mg by mouth once daily. D3            Indwelling Lines/Drains at time of discharge:   Lines/Drains/Airways     Drain                 Gastrostomy/Enterostomy 04/01/17 1024 Gastrostomy tube w/o balloon LUQ decompression 597 days                Time spent on the discharge of patient: 30 minutes  Patient was seen and examined on the date of discharge and determined to be suitable for discharge.         Stewart Velazquez MD  Department of Hospital Medicine  [facility]

## 2018-11-19 NOTE — HOSPITAL COURSE
11/19: Diarrhea improved. 2 BM over 24 hours and are formed. Unable to send to lab to check for C.diff because stool is formed. Pain controlled. Tolerating diet and eating 100% of breakfast.

## 2018-11-19 NOTE — PLAN OF CARE
Problem: Patient Care Overview  Goal: Plan of Care Review  Outcome: Ongoing (interventions implemented as appropriate)  Discharge AVS instructions given by REYNA Zambrano.  Pt verbalized understanding.  PIV removed yesterday.  BG WNL.  Pt to be picked up by daughter. Pending daughter's arrival.

## 2018-11-21 ENCOUNTER — OFFICE VISIT (OUTPATIENT)
Dept: UROLOGY | Facility: CLINIC | Age: 73
End: 2018-11-21
Payer: MEDICARE

## 2018-11-21 VITALS — HEIGHT: 62 IN | BODY MASS INDEX: 26.29 KG/M2 | WEIGHT: 142.88 LBS

## 2018-11-21 DIAGNOSIS — N39.0 RECURRENT UTI: Primary | ICD-10-CM

## 2018-11-21 PROCEDURE — 99999 PR PBB SHADOW E&M-EST. PATIENT-LVL V: CPT | Mod: PBBFAC,,, | Performed by: UROLOGY

## 2018-11-21 PROCEDURE — 81002 URINALYSIS NONAUTO W/O SCOPE: CPT | Mod: PBBFAC | Performed by: UROLOGY

## 2018-11-21 PROCEDURE — 51701 INSERT BLADDER CATHETER: CPT | Mod: PBBFAC | Performed by: UROLOGY

## 2018-11-21 PROCEDURE — 99214 OFFICE O/P EST MOD 30 MIN: CPT | Mod: S$PBB,,, | Performed by: UROLOGY

## 2018-11-21 PROCEDURE — 99215 OFFICE O/P EST HI 40 MIN: CPT | Mod: PBBFAC,25 | Performed by: UROLOGY

## 2018-11-21 RX ORDER — LIDOCAINE HYDROCHLORIDE 20 MG/ML
JELLY TOPICAL ONCE
Status: CANCELLED | OUTPATIENT
Start: 2018-11-21 | End: 2018-11-21

## 2018-11-21 RX ORDER — AMOXICILLIN 250 MG/1
500 CAPSULE ORAL ONCE
Status: CANCELLED | OUTPATIENT
Start: 2018-11-21 | End: 2018-11-21

## 2018-11-21 NOTE — PATIENT INSTRUCTIONS
Extra renal pelvis (normal variation of the anatomy)  Large bladder on the CT scan on 11/14/2018, however, you empty well  Recurrent UTI will complete the workup with a cystoscopy    Cystoscopy    Cystoscopy is a procedure that lets your doctor look directly inside your urethra and bladder. It can be used to:  · Help diagnose a problem with your urethra, bladder, or kidneys.  · Take a sample (biopsy) of bladder or urethral tissue.  · Treat certain problems (such as removing kidney stones).  · Place a stent to bypass an obstruction.  · Take special X-rays of the kidneys.  Based on the findings, your doctor may recommend other tests or treatments.  What is a cystoscope?  A cystoscope is a telescope-like instrument that contains lenses and fiberoptics (small glass wires that make bright light). The cystoscope may be straight and rigid, or flexible to bend around curves in the urethra. The doctor may look directly into the cystoscope, or project the image onto a monitor.  Getting ready  · Ask your doctor if you should stop taking any medicines before the procedure.  · Ask whether you should avoid eating or drinking anything after midnight before the procedure.  · Follow any other instructions your doctor gives you.  Tell your doctor before the exam if you:  · Take any medicines, such as aspirin or blood thinners  · Have allergies to any medicines  · Are pregnant   The procedure  Cystoscopy is done in the doctors office, surgery center, or hospital. The doctor and a nurse are present during the procedure. It takes only a few minutes, longer if a biopsy, X-ray, or treatment needs to be done.  During the procedure:  · You lie on an exam table on your back, knees bent and legs apart. You are covered with a drape.  · Your urethra and the area around it are washed. Anesthetic jelly may be applied to numb the urethra. Other pain medicine is usually not needed. In some cases, you may be offered a mild sedative to help you  relax. If a more extensive procedure is to be done, such as a biopsy or kidney stone removal, general anesthesia may be needed.  · The cystoscope is inserted. A sterile fluid is put into the bladder to expand it. You may feel pressure from this fluid.  · When the procedure is done, the cystoscope is removed.  After the procedure  If you had a sedative, general anesthesia, or spinal anesthesia, you must have someone drive you home. Once youre home:  · Drink plenty of fluids.  · You may have burning or light bleeding when you urinate--this is normal.  · Medicines may be prescribed to ease any discomfort or prevent infection. Take these as directed.  · Call your doctor if you have heavy bleeding or blood clots, burning that lasts more than a day, a fever over 100°F  (38° C), or trouble urinating.  Date Last Reviewed: 1/1/2017  © 2485-6171 [employer]. [address] [place] [address], [place] [address]. All rights reserved. This information is not intended as a substitute for professional medical care. Always follow your healthcare professional's instructions.

## 2018-11-21 NOTE — H&P (VIEW-ONLY)
CHIEF COMPLAINT:    Mrs. Judd is a 73 y.o. female presenting for a consultation at the request of Dr. Danelle Noe. Patient presents with recurrent UTI.    PRESENTING ILLNESS:    Anayeli Judd is a 73 y.o. female who has a history of recurrent UTI, has extra renal pelvis bilaterally.  She was recently hospitalized for abdominal pain, she is status post colon resection and has had several admissions for SBO.  She urinates every 2 hours.  In the hospital, a CT scan was obtained and she had a very large bladder.  However, post void residuals were normal.  She states that she saw Dr. Epstein in the past for recurrent UTI's but did not have a cystoscopy.  She has pelvic organ prolapse that is being managed by Dr. Kuo with a pessary which can be seen on the CT scan from 2018.  She states that she wears a pad for both urinary and fecal incontinence.      , hysterectomy for abnormal PAP, no further therapy needed, not sexually active (), has episodes of diarrhea, states she has decreased rectal tone, flares every couple of weeks.      REVIEW OF SYSTEMS:    Review of Systems   Constitutional: Negative.    HENT: Negative.    Eyes: Negative.    Respiratory: Negative.    Cardiovascular: Negative.    Gastrointestinal: Positive for abdominal pain (intermittent, self limited).   Genitourinary: Positive for urgency.   Musculoskeletal: Negative.    Skin: Negative.    Neurological: Negative.    Endo/Heme/Allergies: Negative.    Psychiatric/Behavioral: Negative.        PATIENT HISTORY:    Past Medical History:   Diagnosis Date    Allergic rhinitis     Arthritis     Blood transfusion     during delivery and     Bowel obstruction     Cervical radiculopathy     followed by dr cloud    Colon cancer     transverse colon; resected; Stage IIA (pT3 pN0 MX)    Diarrhea     Family history of breast cancer     Family history of colon cancer     Fatty liver     GERD (gastroesophageal reflux  disease)     History of shingles     Hyperlipidemia     Hypothyroidism     Irritable bowel syndrome     Microscopic colitis     treated     Raynaud phenomenon     Raynaud's disease     Type 2 diabetes mellitus        Past Surgical History:   Procedure Laterality Date    APPENDECTOMY      BACK SURGERY      CARPAL TUNNEL RELEASE      bilateral      SECTION      CHOLECYSTECTOMY  1965    COLECTOMY  2011    Transverse colon resection by Dr. Aguirre    COLONOSCOPY N/A 2017    Procedure: COLONOSCOPY;  Surgeon: Manjit Alvarez MD;  Location: [facility] (4TH FLR);  Service: Endoscopy;  Laterality: N/A;    COLONOSCOPY N/A 2017    Performed by [facility] (4TH FLR)    COLONOSCOPY N/A 2013    Performed by Angelo Reynolds MD at [facility] (4TH FLR)    EGD (ESOPHAGOGASTRODUODENOSCOPY) N/A 2018    Performed by Angelo Reynolds MD at [facility] (2ND FLR)    ESOPHAGOGASTRODUODENOSCOPY N/A 2018    Procedure: EGD (ESOPHAGOGASTRODUODENOSCOPY);  Surgeon: Angelo Reynolds MD;  Location: [facility] (2ND FLR);  Service: Endoscopy;  Laterality: N/A;    EXPLORATORY-LAPAROTOMY N/A 2017    Performed by Herman Fletcher [facility] at [facility] 2ND FLR    EXPLORATORY-LAPAROTOMY N/A 2014    Performed by Gonzalez Silvestre [facility] at [facility] 2ND FLR    EYE SURGERY      Cataract Removal    HYSTERECTOMY      LYSIS-ADHESION N/A 2017    [facility]formed by Herman [facility] at [facility] FLR    LYSIS-ADHESION N/A 2014    [facility] by Gonzalez [facility] at [facility] 2[facility] FLR    PLACEMENT  2017    Performed by Herman Fletcher [facility] at [facility] FLR    posterolateral fusion with autograft bone and [place] mineralized bone matrix  13    at [facility] for lumbar spine stenosis    REPAIR  2017    Performed by Herman Fletcher MD at [facility] OR 2ND FLR    RESECTION - SMALL BOWEL N/A 2014    [facility] by [facility] at [facility]    TOE  SURGERY      TONSILLECTOMY      TRIGGER FINGER RELEASE         Family History   Problem Relation Age of Onset    Heart disease Father 50        Mi age 50    Colon cancer Father     Bladder Cancer Mother         non smoker    Cataracts Mother     Glaucoma Mother     Heart disease Mother     Hyperlipidemia Mother     Kidney disease Mother     Breast cancer Sister 79    Arthritis Daughter     Asthma Daughter     Depression Daughter     Hypertension Daughter     Stroke Daughter 40    Arthritis Brother     Colon cancer Brother 70    Alcohol abuse Brother     Parkinsonism Brother     Alcohol abuse Brother     Depression Daughter      Socioeconomic History    Marital status:    Tobacco Use    Smoking status: Never Smoker    Smokeless tobacco: Never Used   Substance and Sexual Activity    Alcohol use: Yes     Alcohol/week: 3.0 oz     Types: 5 Glasses of wine per week     Comment: socially    Drug use: No    Sexual activity: No     Birth control/protection: Abstinence   Social History Narrative    , lives alone.  Primary support are her children and friends.       Allergies:  Codeine; Percocet  [oxycodone-acetaminophen]; and Sulfa (sulfonamide antibiotics)    Medications:  Outpatient Encounter Medications as of 11/21/2018   Medication Sig Dispense Refill    atorvastatin (LIPITOR) 40 MG tablet Take 1 tablet (40 mg total) by mouth once daily. 90 tablet 1    betamethasone dipropionate (DIPROLENE) 0.05 % ointment Apply every Am x 2 weeks then twice weekly 45 g 3    blood sugar diagnostic Strp Dispense madie contour strips; test blood sugar once daily 100 strip 11    blood-glucose meter (CONTOUR METER) kit Please dispense Madie Contour meter and supplies Use as instructed Test Blood sugar once daily 1 each 0    ciprofloxacin HCl (CIPRO) 500 MG tablet Take 1 tablet (500 mg total) by mouth every 12 (twelve) hours. for 12 days 24 tablet 0    conjugated estrogens (PREMARIN) vaginal  "cream Place 0.5 g vaginally every evening. 30 g 11    diphenhydrAMINE (BENADRYL) 25 mg capsule Take 1 each (25 mg total) by mouth nightly as needed for Itching, Allergies or Insomnia.  0    fluconazole (DIFLUCAN) 100 MG tablet Take 1 tablet (100 mg total) by mouth once daily. for 3 days 3 tablet 0    fluticasone (FLONASE) 50 mcg/actuation nasal spray 2 sprays by Each Nare route once daily.  0    FREESTYLE EFREN READER Misc TEST as directed  0    FREESTYLE EFREN SENSOR Kit   0    gabapentin (NEURONTIN) 600 MG tablet Take 1,200 mg by mouth 2 (two) times daily.       hydrocortisone 2.5 % cream   0    insulin glargine (LANTUS SOLOSTAR U-100 INSULIN) 100 unit/mL (3 mL) InPn pen Inject 10 Units into the skin every evening. (Patient taking differently: Inject 12 Units into the skin every evening. ) 15 mL 3    insulin syringe-needle U-100 0.3 mL 31 gauge x 15/64" Syrg 1 Syringe by Misc.(Non-Drug; Combo Route) route once daily. 30 Syringe 3    ipratropium (ATROVENT) 0.06 % nasal spray   0    lancets (ONE TOUCH ULTRASOFT LANCETS) Misc Test blood sugar once daily 200 each 11    levothyroxine (SYNTHROID) 100 MCG tablet TAKE 1 TABLET BY MOUTH EVERY MORNING 90 tablet 0    linagliptin (TRADJENTA) 5 mg Tab tablet Take 1 tablet (5 mg total) by mouth once daily. 90 tablet 3    magnesium 30 mg Tab Take 500 capsules by mouth. Patient's taking magnesium 500mg QD      meloxicam (MOBIC) 15 MG tablet TAKE 1 TABLET BY MOUTH DAILY WITH FOOD AS NEEDED FOR PAIN OR ARTHRITIS 90 tablet 0    metFORMIN (GLUCOPHAGE-XR) 750 MG 24 hr tablet Take 1 tablet (750 mg total) by mouth 2 (two) times daily with meals. 60 tablet 11    metronidazole 0.75% (METROCREAM) 0.75 % Crea   0    mirabegron 50 mg Tb24 Take 1 tablet (50 mg total) by mouth once daily. 30 tablet 11    ondansetron (ZOFRAN) 4 MG tablet Take 1 tablet (4 mg total) by mouth every 8 (eight) hours as needed for Nausea. 12 tablet 0    ospemifene (OSPHENA) 60 mg Tab Take 60 mg by " mouth once daily. 90 tablet 1    pantoprazole (PROTONIX) 40 MG tablet TAKE 1 TABLET BY MOUTH ONCE DAILY 90 tablet 1    polyethylene glycol (GLYCOLAX) 17 gram/dose powder Take 17 g by mouth once daily.  0    rifAXImin (XIFAXAN) 200 mg Tab Take 1 tablet (200 mg total) by mouth 2 (two) times daily. for 12 days 24 tablet 0    triamcinolone acetonide 0.1% (KENALOG) 0.1 % paste apply to affected area with A Q-TIP three times a day  0    VIT A/VIT C/VIT E/ZINC/COPPER (ICAPS AREDS ORAL) Take by mouth 2 (two) times daily.      vitamin D 1000 units Tab Take 185 mg by mouth once daily. D3      azelastine (ASTELIN) 137 mcg nasal spray 1 spray (137 mcg total) by Nasal route 2 (two) times daily. 30 mL 1     No facility-administered encounter medications on file as of 11/21/2018.          PHYSICAL EXAMINATION:    The patient generally appears in good health, is appropriately interactive, and is in no apparent distress.    Skin: No lesions.    Mental: Cooperative with normal affect.    Neuro: Grossly intact.    HEENT: Normal. No evidence of lymphadenopathy.    Chest:  normal inspiratory effort.    Abdomen:  Soft, non-tender. No masses or organomegaly. Bladder is not palpable. No evidence of flank discomfort. No evidence of inguinal hernia.    Extremities: No clubbing, cyanosis, or edema    Normal external female genitalia  Urethral meatus is normal  Urethra and bladder are nontender to bimanual exam  Well supported anteriorly and posteriorly   No adnexal masses  PVR by catheterization was 90 ml    LABS:    Lab Results   Component Value Date    BUN 11 11/19/2018    CREATININE 0.8 11/19/2018     UA 1.005, pH 5, otherwis,e negative      IMPRESSION:    Encounter Diagnoses   Name Primary?    Recurrent UTI Yes       PLAN:    1.  I offered to take the pessary out at the time of cystoscopy and have her return a few days later to do an assessment of the prolapse.  She has been advised not to have abdominal surgeries due to her  history of SBO and multiple surgeries to treat this.  2.  Cystoscopy scheduled

## 2018-11-21 NOTE — PROGRESS NOTES
CHIEF COMPLAINT:    Mrs. Judd is a 73 y.o. female presenting for a consultation at the request of Dr. Danelle Noe. Patient presents with recurrent UTI.    PRESENTING ILLNESS:    Anayeli Judd is a 73 y.o. female who has a history of recurrent UTI, has extra renal pelvis bilaterally.  She was recently hospitalized for abdominal pain, she is status post colon resection and has had several admissions for SBO.  She urinates every 2 hours.  In the hospital, a CT scan was obtained and she had a very large bladder.  However, post void residuals were normal.  She states that she saw Dr. Epstein in the past for recurrent UTI's but did not have a cystoscopy.  She has pelvic organ prolapse that is being managed by Dr. Kuo with a pessary which can be seen on the CT scan from 2018.  She states that she wears a pad for both urinary and fecal incontinence.      , hysterectomy for abnormal PAP, no further therapy needed, not sexually active (), has episodes of diarrhea, states she has decreased rectal tone, flares every couple of weeks.      REVIEW OF SYSTEMS:    Review of Systems   Constitutional: Negative.    HENT: Negative.    Eyes: Negative.    Respiratory: Negative.    Cardiovascular: Negative.    Gastrointestinal: Positive for abdominal pain (intermittent, self limited).   Genitourinary: Positive for urgency.   Musculoskeletal: Negative.    Skin: Negative.    Neurological: Negative.    Endo/Heme/Allergies: Negative.    Psychiatric/Behavioral: Negative.        PATIENT HISTORY:    Past Medical History:   Diagnosis Date    Allergic rhinitis     Arthritis     Blood transfusion     during delivery and     Bowel obstruction     Cervical radiculopathy     followed by dr cloud    Colon cancer     transverse colon; resected; Stage IIA (pT3 pN0 MX)    Diarrhea     Family history of breast cancer     Family history of colon cancer     Fatty liver     GERD (gastroesophageal reflux  disease)     History of shingles     Hyperlipidemia     Hypothyroidism     Irritable bowel syndrome     Microscopic colitis     treated     Raynaud phenomenon     Raynaud's disease     Type 2 diabetes mellitus        Past Surgical History:   Procedure Laterality Date    APPENDECTOMY      BACK SURGERY      CARPAL TUNNEL RELEASE      bilateral      SECTION      CHOLECYSTECTOMY  1965    COLECTOMY  2011    Transverse colon resection by Dr. Aguirre    COLONOSCOPY N/A 2017    Procedure: COLONOSCOPY;  Surgeon: Manjit Alvarez MD;  Location: [facility] (4TH FLR);  Service: Endoscopy;  Laterality: N/A;    COLONOSCOPY N/A 2017    [facility] by [facility] at [facility] (4TH FLR)    COLONOSCOPY N/A 2013    Performed by Angelo Reynolds [facility] at [facility] (4TH FLR)    EGD (ESOPHAGOGASTRODUODENOSCOPY) N/A 2018    Performed by Angelo Reynolds [facility] at [facility] (2ND FLR)    ESOPHAGOGASTRODUODENOSCOPY N/A 2018    Procedure: EGD (ESOPHAGOGASTRODUODENOSCOPY);  Surgeon: Angelo Reynolds MD;  Location: [facility] (2ND FLR);  Service: Endoscopy;  Laterality: N/A;    EXPLORATORY-LAPAROTOMY N/A 2017    Performed by Herman Fletcher [facility] at [facility] OR 2ND FLR    EXPLORATORY-LAPAROTOMY N/A 2014    [facility]formed by Gonzalez [facility] at [facility] 2[facility] FLR    EYE SURGERY      Cataract Removal    HYSTERECTOMY      LYSIS-ADHESION N/A 2017    Performed [facility] at [facility] 2[facility] FLR    LYSIS-ADHESION N/A 2014    [facility]formed by Gonzalez [facility] at [facility] 2[facility]LR    PLACEMENT  2017    Performed by Herman [facility] at [facility]LR    posterolateral fusion with autograft bone and [place] mineralized bone matrix  13    at [facility] for lumbar spine stenosis    REPAIR  2017    Performed by [facility] at [facility]LR    RESECTION - SMALL BOWEL N/A 2014    [facility]    TOE  SURGERY      TONSILLECTOMY      TRIGGER FINGER RELEASE         Family History   Problem Relation Age of Onset    Heart disease Father 50        Mi age 50    Colon cancer Father     Bladder Cancer Mother         non smoker    Cataracts Mother     Glaucoma Mother     Heart disease Mother     Hyperlipidemia Mother     Kidney disease Mother     Breast cancer Sister 79    Arthritis Daughter     Asthma Daughter     Depression Daughter     Hypertension Daughter     Stroke Daughter 40    Arthritis Brother     Colon cancer Brother 70    Alcohol abuse Brother     Parkinsonism Brother     Alcohol abuse Brother     Depression Daughter      Socioeconomic History    Marital status:    Tobacco Use    Smoking status: Never Smoker    Smokeless tobacco: Never Used   Substance and Sexual Activity    Alcohol use: Yes     Alcohol/week: 3.0 oz     Types: 5 Glasses of wine per week     Comment: socially    Drug use: No    Sexual activity: No     Birth control/protection: Abstinence   Social History Narrative    , lives alone.  Primary support are her children and friends.       Allergies:  Codeine; Percocet  [oxycodone-acetaminophen]; and Sulfa (sulfonamide antibiotics)    Medications:  Outpatient Encounter Medications as of 11/21/2018   Medication Sig Dispense Refill    atorvastatin (LIPITOR) 40 MG tablet Take 1 tablet (40 mg total) by mouth once daily. 90 tablet 1    betamethasone dipropionate (DIPROLENE) 0.05 % ointment Apply every Am x 2 weeks then twice weekly 45 g 3    blood sugar diagnostic Strp Dispense madie contour strips; test blood sugar once daily 100 strip 11    blood-glucose meter (CONTOUR METER) kit Please dispense Madie Contour meter and supplies Use as instructed Test Blood sugar once daily 1 each 0    ciprofloxacin HCl (CIPRO) 500 MG tablet Take 1 tablet (500 mg total) by mouth every 12 (twelve) hours. for 12 days 24 tablet 0    conjugated estrogens (PREMARIN) vaginal  "cream Place 0.5 g vaginally every evening. 30 g 11    diphenhydrAMINE (BENADRYL) 25 mg capsule Take 1 each (25 mg total) by mouth nightly as needed for Itching, Allergies or Insomnia.  0    fluconazole (DIFLUCAN) 100 MG tablet Take 1 tablet (100 mg total) by mouth once daily. for 3 days 3 tablet 0    fluticasone (FLONASE) 50 mcg/actuation nasal spray 2 sprays by Each Nare route once daily.  0    FREESTYLE FEREN READER Misc TEST as directed  0    FREESTYLE EFREN SENSOR Kit   0    gabapentin (NEURONTIN) 600 MG tablet Take 1,200 mg by mouth 2 (two) times daily.       hydrocortisone 2.5 % cream   0    insulin glargine (LANTUS SOLOSTAR U-100 INSULIN) 100 unit/mL (3 mL) InPn pen Inject 10 Units into the skin every evening. (Patient taking differently: Inject 12 Units into the skin every evening. ) 15 mL 3    insulin syringe-needle U-100 0.3 mL 31 gauge x 15/64" Syrg 1 Syringe by Misc.(Non-Drug; Combo Route) route once daily. 30 Syringe 3    ipratropium (ATROVENT) 0.06 % nasal spray   0    lancets (ONE TOUCH ULTRASOFT LANCETS) Misc Test blood sugar once daily 200 each 11    levothyroxine (SYNTHROID) 100 MCG tablet TAKE 1 TABLET BY MOUTH EVERY MORNING 90 tablet 0    linagliptin (TRADJENTA) 5 mg Tab tablet Take 1 tablet (5 mg total) by mouth once daily. 90 tablet 3    magnesium 30 mg Tab Take 500 capsules by mouth. Patient's taking magnesium 500mg QD      meloxicam (MOBIC) 15 MG tablet TAKE 1 TABLET BY MOUTH DAILY WITH FOOD AS NEEDED FOR PAIN OR ARTHRITIS 90 tablet 0    metFORMIN (GLUCOPHAGE-XR) 750 MG 24 hr tablet Take 1 tablet (750 mg total) by mouth 2 (two) times daily with meals. 60 tablet 11    metronidazole 0.75% (METROCREAM) 0.75 % Crea   0    mirabegron 50 mg Tb24 Take 1 tablet (50 mg total) by mouth once daily. 30 tablet 11    ondansetron (ZOFRAN) 4 MG tablet Take 1 tablet (4 mg total) by mouth every 8 (eight) hours as needed for Nausea. 12 tablet 0    ospemifene (OSPHENA) 60 mg Tab Take 60 mg by " mouth once daily. 90 tablet 1    pantoprazole (PROTONIX) 40 MG tablet TAKE 1 TABLET BY MOUTH ONCE DAILY 90 tablet 1    polyethylene glycol (GLYCOLAX) 17 gram/dose powder Take 17 g by mouth once daily.  0    rifAXImin (XIFAXAN) 200 mg Tab Take 1 tablet (200 mg total) by mouth 2 (two) times daily. for 12 days 24 tablet 0    triamcinolone acetonide 0.1% (KENALOG) 0.1 % paste apply to affected area with A Q-TIP three times a day  0    VIT A/VIT C/VIT E/ZINC/COPPER (ICAPS AREDS ORAL) Take by mouth 2 (two) times daily.      vitamin D 1000 units Tab Take 185 mg by mouth once daily. D3      azelastine (ASTELIN) 137 mcg nasal spray 1 spray (137 mcg total) by Nasal route 2 (two) times daily. 30 mL 1     No facility-administered encounter medications on file as of 11/21/2018.          PHYSICAL EXAMINATION:    The patient generally appears in good health, is appropriately interactive, and is in no apparent distress.    Skin: No lesions.    Mental: Cooperative with normal affect.    Neuro: Grossly intact.    HEENT: Normal. No evidence of lymphadenopathy.    Chest:  normal inspiratory effort.    Abdomen:  Soft, non-tender. No masses or organomegaly. Bladder is not palpable. No evidence of flank discomfort. No evidence of inguinal hernia.    Extremities: No clubbing, cyanosis, or edema    Normal external female genitalia  Urethral meatus is normal  Urethra and bladder are nontender to bimanual exam  Well supported anteriorly and posteriorly   No adnexal masses  PVR by catheterization was 90 ml    LABS:    Lab Results   Component Value Date    BUN 11 11/19/2018    CREATININE 0.8 11/19/2018     UA 1.005, pH 5, otherwis,e negative      IMPRESSION:    Encounter Diagnoses   Name Primary?    Recurrent UTI Yes       PLAN:    1.  I offered to take the pessary out at the time of cystoscopy and have her return a few days later to do an assessment of the prolapse.  She has been advised not to have abdominal surgeries due to her  history of SBO and multiple surgeries to treat this.  2.  Cystoscopy scheduled

## 2018-11-21 NOTE — LETTER
November 21, 2018      Danelle Noe MD  [address]  [facility] [address]           [facility]  [address]  [facility] [address]  Phone: [phone]          Patient: Anayeli Judd   MR Number: [id number]   YOB: 1945   Date of Visit: 11/21/2018       Dear Dr. Danelle Noe:    Thank you for referring Anayeli Judd to me for evaluation. Attached you will find relevant portions of my assessment and plan of care.    If you have questions, please do not hesitate to call me. I look forward to following Anayeli Judd along with you.    Sincerely,    Viridiana Valenzuela MD    Enclosure  CC:  No Recipients    If you would like to receive this communication electronically, please contact [email or web address] or [phone] to request more information on [email or web address] access.    For providers and/or their staff who would like to refer a patient to [facility], please contact us through our one-stop-shop provider referral line, [facility], at [phone].    If you feel you have received this communication in error or would no longer like to receive these types of communications, please e-mail [email or web address]

## 2018-11-23 ENCOUNTER — TELEPHONE (OUTPATIENT)
Dept: GASTROENTEROLOGY | Facility: CLINIC | Age: 73
End: 2018-11-23

## 2018-11-23 NOTE — TELEPHONE ENCOUNTER
----- Message from Severo Coughlin MD sent at 11/23/2018  1:57 PM CST -----  Please inform patient:  Biopsies all normal. No h pylori infection  Thanks WILMA  ====  Path below:  2. Helicobacter pylori stain is negative. The controls reacted appropriately.  1. Duodenum (biopsy):  Benign duodenum mucosa  Negative for active inflammation  No features of sprue  No organisms  2. Stomach (biopsy):  Benign oxyntic mucosa with minimal lymphocytes in the lamina propria  Negative for active inflammation

## 2018-11-26 ENCOUNTER — HOSPITAL ENCOUNTER (OUTPATIENT)
Dept: UROLOGY | Facility: HOSPITAL | Age: 73
Discharge: HOME | End: 2018-11-26
Attending: UROLOGY
Payer: MEDICARE

## 2018-11-26 VITALS
TEMPERATURE: 98 F | SYSTOLIC BLOOD PRESSURE: 110 MMHG | WEIGHT: 143.75 LBS | DIASTOLIC BLOOD PRESSURE: 56 MMHG | RESPIRATION RATE: 18 BRPM | HEART RATE: 69 BPM | HEIGHT: 62 IN | BODY MASS INDEX: 26.45 KG/M2

## 2018-11-26 DIAGNOSIS — N39.0 RECURRENT UTI: ICD-10-CM

## 2018-11-26 PROCEDURE — 52000 CYSTOURETHROSCOPY: CPT

## 2018-11-26 RX ORDER — LIDOCAINE HYDROCHLORIDE 20 MG/ML
JELLY TOPICAL ONCE
Status: COMPLETED | OUTPATIENT
Start: 2018-11-26 | End: 2018-11-26

## 2018-11-26 RX ORDER — AMOXICILLIN 500 MG/1
500 CAPSULE ORAL ONCE
Status: DISCONTINUED | OUTPATIENT
Start: 2018-11-26 | End: 2019-12-13

## 2018-11-26 RX ADMIN — LIDOCAINE HYDROCHLORIDE: 20 JELLY TOPICAL at 10:11

## 2018-11-26 NOTE — INTERVAL H&P NOTE
The patient has been examined and the H&P has been reviewed:    I concur with the findings and no changes have occurred since H&P was written.    Patient is on cipro for a positive urine culture    There are no hospital problems to display for this patient.

## 2018-11-26 NOTE — PROCEDURES
CYSTOSCOPY REPORT    Pre Procedure Diagnosis:  Recurrent UTI    Post Procedure Diagnosis:  Normal lower urinary tract (note:  Bladder visualized with the pessary in place)    Anesthesia: 10 cc 2% lidocaine jelly applied per urethra.    14 FR Flexible Olympus cystoscope used.    FINDINGS:  Dome, anterior, posterior, lateral walls and bladder base free of urothelial abnormalities. Right and left ureteral orifices in the normal postion and configuration, both effluxed clear urine.  Bladder neck and urethra were normal.    Specimen:  none    The patient was taken to the cystoscopy suite and placed in dorsal lithotomy position.  The genitalia was prepped and draped  in the usual sterile fashion.  Two percent lidocaine jelly was inserted in the urethra.  After sufficent time had passed to allow good local anesthesia, the cystoscope was inserted in the urethra and passed into the bladder visualizing the urethra along its entire course.  The dome, anterior, posterior and lateral walls were examined systematically.  The ureteral orifices were in their usual position and configuration.  The cystoscope was turned upon itself 180 degrees to visualize the bladder neck.  The cystoscope was then brought to the level of the bladder neck, the water was turned on and the urethra was visualized.  The cystoscope was removed and the patient was instructed to urinate prior to leaving the office.     Post procedure medication:  Patient is on cipro     ASSESSMENT/PLAN:  73 year old woman status post flexible cystoscopy.  1. Push fluids for 24 hours.  2. May see blood in the urine, this should gradually improve over the next 2-3 days.  3. The patient was instructed to return to the office or go to the emergency should fever, chills, cloudy urine, or inability to urinate develop.  4. Follow up a few days as her pessary was removed today.  Was given back to the patient after being washed.

## 2018-11-26 NOTE — PATIENT INSTRUCTIONS
What to Expect After a Cystoscopy  For the next 24-48 hours, you may feel a mild burning when you urinate. This burning is normal and expected. Drink plenty of water to dilute the urine to help relieve the burning sensation. You may also see a small amount of blood in your urine after the procedure.    Unless you are already taking antibiotics, you may be given an antibiotic after the test to prevent infection.    Signs and Symptoms to Report  Call the [facility] at [phone] if you develop any of the following:  · Fever of 101 degrees or higher  · Chills or persistent bleeding  · Inability to urinate

## 2018-11-27 NOTE — PHYSICIAN QUERY
PT Name: Anayeli Judd  MR #: [id number]     Physician Query Form - Etiology of Condition Clarification      CDS/: Olimpia Bloom               Contact information: [email or web address]  This form is a permanent document in the medical record.     Query Date: November 27, 2018    By submitting this query, we are merely seeking further clarification of documentation.  Please utilize your independent clinical judgment when addressing the question(s) below.     The medical record contains the following:    Findings Supporting Clinical Information Location in Medical Record    No signs of pathology on EGD to explain her abdominal pain. No evidence of esophagitis, gastritis or PUD     GI Treatment Plan 11/16    Worsening of abdominal pain. Associated with nausea  Off and on abdominal pain for the last 2 weeks, usually worse 30 min - 1 hour after eating. Pain is epigastric that radiates to suprapubic, it is sharp and extreme    No significant SMA stenosis to explain abdominal symptoms. Pain not consistent with chronic mesenteric ischemia   Vascular Surgery PN 11/16    She recently completed a 3 month course of keflex for chronic UTIs and she denies any urinary symptoms and reports frequency, but not dysuria. H&P   Abdominal Pain       BID dicyclomine per GI recs    On rifaximin as a trial or possible small intestinal bacterial overgrowth (SIBO)    Questionable if distended bladder was causing some discomfort that could be contributing to her symptoms along with the UTI?    Acute cystitis without hematuria  Hydronephrosis  Frequent UTI  Urine cultures from 11/14 and 11/15 both growing pan sensitive enterobacter  given history of incontience and recurrent uti's, will treat for complicated UTI     Discharge Summary     Please document your best medical opinion regarding the etiology of ___Abdominal Pain__ for which the primary focus of treatment is/was directed.                        x Clinically Undetermined      Please document in your progress notes daily for the duration of treatment, until resolved, and include in your discharge summary.

## 2018-11-29 ENCOUNTER — TELEPHONE (OUTPATIENT)
Dept: GASTROENTEROLOGY | Facility: HOSPITAL | Age: 73
End: 2018-11-29

## 2018-11-29 ENCOUNTER — TELEPHONE (OUTPATIENT)
Dept: GASTROENTEROLOGY | Facility: CLINIC | Age: 73
End: 2018-11-29

## 2018-11-29 DIAGNOSIS — R19.7 DIARRHEA, UNSPECIFIED TYPE: Primary | ICD-10-CM

## 2018-11-29 NOTE — TELEPHONE ENCOUNTER
----- Message from Guerrero Betts MD sent at 11/29/2018  8:11 AM CST -----  Hi,    Can we please see if we can fit Ms Judd into clinic next week with Dr. Jefferson and I?  ----- Message -----  From: Lisa Viveros MA  Sent: 11/28/2018   3:26 PM  To: Guerrero Betts MD    Patient is having increased diarrhea. PCP wanted you to see again. Please advise

## 2018-11-30 ENCOUNTER — TELEPHONE (OUTPATIENT)
Dept: ENDOSCOPY | Facility: HOSPITAL | Age: 73
End: 2018-11-30

## 2018-11-30 NOTE — TELEPHONE ENCOUNTER
Message   Received: Today   Message Contents   MD Lisa Jones MA   Cc: MD Elvira Higuera,   Please schedule EUS

## 2018-11-30 NOTE — TELEPHONE ENCOUNTER
----- Message from Jacob Whitaker MD sent at 11/30/2018  4:13 PM CST -----   Elvira,  Please schedule EUS    ----- Message -----  From: Guerrero Betts MD  Sent: 11/29/2018   9:16 AM  To: Jacob Whitaker MD    Hi Dr Parvez Whitaker,    I am seeing a patient for follow-up with Dr. Jefferson (linked on message). Due to pancreatic head changes (mild fatty involution) on CT imaging, he asked me to order an EUS with you. From what I recall we usually need approval before scheduling a procedure?    Thanks,  Nahid

## 2018-12-03 ENCOUNTER — LAB VISIT (OUTPATIENT)
Dept: LAB | Facility: HOSPITAL | Age: 73
End: 2018-12-03
Attending: INTERNAL MEDICINE
Payer: MEDICARE

## 2018-12-03 DIAGNOSIS — R19.7 DIARRHEA, UNSPECIFIED TYPE: ICD-10-CM

## 2018-12-03 PROCEDURE — 82656 EL-1 FECAL QUAL/SEMIQ: CPT

## 2018-12-03 PROCEDURE — 89125 SPECIMEN FAT STAIN: CPT

## 2018-12-03 PROCEDURE — 87507 IADNA-DNA/RNA PROBE TQ 12-25: CPT

## 2018-12-04 ENCOUNTER — TELEPHONE (OUTPATIENT)
Dept: GASTROENTEROLOGY | Facility: CLINIC | Age: 73
End: 2018-12-04

## 2018-12-04 LAB — FAT STL SUDAN IV STN: NORMAL

## 2018-12-04 NOTE — TELEPHONE ENCOUNTER
----- Message from Guerrero Betts MD sent at 12/4/2018  8:32 AM CST -----  Janie Baires,    I see that Ms Judd did not answer last week when you tried to call and schedule an appointment. Did she ever call back?    Nahid Diaz  ----- Message -----  From: Lisa Viveros MA  Sent: 12/3/2018   1:11 PM  To: Tony Perea, Guerrero Betts MD    The plan is for her to be seen by Dr Betts and Dr Jefferson this week. We have attempted to contact the patient, but had to leave a message. We are waiting for her to call us back  ----- Message -----  From: Tony Perea  Sent: 12/3/2018  10:46 AM  To: Lisa Viveros MA    Pt is being referred by urgent care to see DR Alvarez. I do know he is out of clinic for the next few weeks but urgent care wants an appt scheduled this week with a provider if possible. They have an internal referral entered into [facility]. Please review and contact pt for an appt,thanks

## 2018-12-05 ENCOUNTER — OFFICE VISIT (OUTPATIENT)
Dept: GASTROENTEROLOGY | Facility: CLINIC | Age: 73
End: 2018-12-05
Payer: MEDICARE

## 2018-12-05 ENCOUNTER — TELEPHONE (OUTPATIENT)
Dept: GASTROENTEROLOGY | Facility: CLINIC | Age: 73
End: 2018-12-05

## 2018-12-05 VITALS
HEART RATE: 64 BPM | BODY MASS INDEX: 26.61 KG/M2 | DIASTOLIC BLOOD PRESSURE: 75 MMHG | SYSTOLIC BLOOD PRESSURE: 121 MMHG | HEIGHT: 62 IN | WEIGHT: 144.63 LBS

## 2018-12-05 DIAGNOSIS — R10.13 EPIGASTRIC PAIN: Primary | ICD-10-CM

## 2018-12-05 DIAGNOSIS — R10.33 ABDOMINAL PAIN, PERIUMBILICAL: ICD-10-CM

## 2018-12-05 DIAGNOSIS — R19.7 DIARRHEA, UNSPECIFIED TYPE: ICD-10-CM

## 2018-12-05 DIAGNOSIS — R93.3 ABNORMAL CT SCAN, SMALL BOWEL: Primary | ICD-10-CM

## 2018-12-05 DIAGNOSIS — T18.9XXA FOREIGN BODY IN DIGESTIVE TRACT, INITIAL ENCOUNTER: ICD-10-CM

## 2018-12-05 PROCEDURE — 99213 OFFICE O/P EST LOW 20 MIN: CPT | Mod: PBBFAC | Performed by: INTERNAL MEDICINE

## 2018-12-05 PROCEDURE — 99213 OFFICE O/P EST LOW 20 MIN: CPT | Mod: S$PBB,GC,, | Performed by: INTERNAL MEDICINE

## 2018-12-05 PROCEDURE — 99999 PR PBB SHADOW E&M-EST. PATIENT-LVL III: CPT | Mod: PBBFAC,GC,, | Performed by: INTERNAL MEDICINE

## 2018-12-05 NOTE — PATIENT INSTRUCTIONS
- discontinue sucralfate as no benefit  - will check labs for neuroendocrine processes. These will be checked in 2 weeks. You will need to hold your protonix medication for 2 weeks to avoid a falsely abnormal lab results. Need to be fasting for the lab draw.  - awaiting results of recent stool studies.  - ultrasound evaluation of your pancreas.

## 2018-12-05 NOTE — TELEPHONE ENCOUNTER
----- Message from Guerrero Betts MD sent at 12/5/2018  3:55 PM CST -----  Janie Baires,    Can we schedule Ms Judd's CT scan.     Thanks,  Nahid

## 2018-12-05 NOTE — PROGRESS NOTES
[facility]    Reason for visit: The primary encounter diagnosis was Epigastric pain. A diagnosis of Diarrhea, unspecified type was also pertinent to this visit.  Referring Provider/PCP: Rocio Mc MD    History of Present Illness:  Anayeli Judd is a 73 y.o. female with a history of CRC, multiple admissions for SBO, IBS, microscopic colitis, HLD, T2DM, GERD, Hypothyroidism who is presenting for post-hospitalization follow-up of abdominal pain.    She was recently seen in GI clinic by myself on 11/15/18 due to concern for abdominal pain. During that appointment she noted that her abdominal pain had acutely worsened over preceeding 1-2 weeks necessitating ED hospitalization. Due to concern for abdominal pain and inability to tolerate PO intake she was admitted to the hospital for evaluation on 11/15. Workup included CT AP (11/14) which was unremarkable for acute pathology, EGD (unremarkable, negative gastric and duodenal biopsies). 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 She was discharged feeling improved and tolerating diet. She completed an empiric trial of rifaximin for SIBO given complaint of abdominal pain, bloating and diarrhea.    After discharge from hospital she called clinic due to concern for worsening diarrhea and submitted stool sample (results pending) and is presenting for follow-up.     She notes that her primary complaint today is her mid-epigastric pain. She notes that she is concerned as she does not know what the cause of the pain is and that she is worried it could be related to her scar tissue. On further history she endorses that this pain has been present for years and waxes and wanes over the course of weeks. She says that there  is generally no alleviating factor, though endorses decreased pain with bowel movement. She notes the pain can be worsened after eating and is associated with bloating. Currently pain is 2/10, non-radiating. She has tried to eliminate food from diet without improvement in pain. Was recently given a trial of sucralfate but without improvement. Denies nausea or vomting. Currently endorses good appetite and no issue with PO intake.    Regarding bowel movements, she notes that she has had an issue with diarrhea and fecal incontinence for years. She notes that her bowel movements will alternate from constipation to diarrhea. When she has bowel movements it is traditionally 30' to an hour after PO intake and will have several BM consecutively; does not know if she has a sensation of incomplete emptying but feels that she moves a lot of stool with each bowel movement. She notes the bowel movements will start formed but then become more liquid. No BM today despite PO intake and last BM was yesterday. She has been following with CRS and had undergone workup including negative defacography (9/2017) and per review of notes there was consideration for interstim.      PEndoHx:  EGD. (11/16/18). Dr. Reynolds. Indication:Abdominal pain.  - normal study  - duodenal and gastric biopsies negative. Negative HPylori.    Colonoscopy. (7/27/17) Provider: Dr. Alvarez. Indication: Diarrhea. Extent: Cecum. Preparation:Adequate.  - 5mm transverse polyp  - 2x 3mm transverse polyp [tubular adenoma]  - random colonic biopsies [negative for microscopic colitis]    Review of Systems:   Constitutional: no fever, chills or change in weight   Eyes: no visual changes   ENT: no sore throat or dysphagia  Respiratory: no cough or shortness of breath   Cardiovascular: no chest pain or palpitations   Gastrointestinal: as per HPI  Hematologic/Lymphatic: no easy bruising or lymphadenopathy   Musculoskeletal: no arthralgias or myalgias   Neurological: no change  in mental status  Behavioral/Psych: no change in mood    Medical History:  Past Medical History:   Diagnosis Date    Allergic rhinitis     Arthritis     Blood transfusion     during delivery and     Bowel obstruction     Cervical radiculopathy     followed by dr reynolds    Colon cancer     transverse colon; resected; Stage IIA (pT3 pN0 MX)    Diarrhea     Family history of breast cancer     Family history of colon cancer     Fatty liver     GERD (gastroesophageal reflux disease)     History of shingles     Hyperlipidemia     Hypothyroidism     Irritable bowel syndrome     Microscopic colitis     treated     Raynaud phenomenon     Raynaud's disease     Type 2 diabetes mellitus        Past Surgical History:   Procedure Laterality Date    APPENDECTOMY      BACK SURGERY      CARPAL TUNNEL RELEASE      bilateral      SECTION      CHOLECYSTECTOMY  1965    COLECTOMY  2011    Transverse colon resection by Dr. Aguirre    COLONOSCOPY N/A 2017    Procedure: COLONOSCOPY;  Surgeon: Manjit Alvarez MD;  Location: [facility] (4TH FLR);  Service: Endoscopy;  Laterality: N/A;    COLONOSCOPY N/A 2017    Performed by Manjit Alvarez MD at [facility] (4TH FLR)    COLONOSCOPY N/A 2013    Performed by Angelo Reynolds MD at [facility] (4TH FLR)    EGD (ESOPHAGOGASTRODUODENOSCOPY) N/A 2018    Performed by Angelo Reynolds MD at [facility] (2ND FLR)    ESOPHAGOGASTRODUODENOSCOPY N/A 2018    Procedure: EGD (ESOPHAGOGASTRODUODENOSCOPY);  Surgeon: Angelo Reynolds MD;  Location: [facility] (2ND FLR);  Service: Endoscopy;  Laterality: N/A;    EXPLORATORY-LAPAROTOMY N/A 2017    Performed by Herman Fletcher [facility] at [facility] 2ND FLR    EXPLORATORY-LAPAROTOMY N/A 2014    Performed by Gonzalez Silvestre MD at [facility] OR 2ND [facility]    EYE SURGERY      Cataract Removal    HYSTERECTOMY      LYSIS-ADHESION N/A 2017    Performed by Herman Fletcher MD at  [facility] OR 2ND FLR    LYSIS-ADHESION N/A 9/2/2014    Performed by Gonzalez Silvestre MD at [facility] OR 2ND FLR    PLACEMENT  4/1/2017    Performed by Herman Fletcher MD at [facility] OR 2ND FLR    posterolateral fusion with autograft bone and [place] mineralized bone matrix  2/1/13    at [facility] for lumbar [facility] stenosis    REPAIR  4/1/2017    Performed by [facility] Fletcher MD at [facility] OR 2ND FLR    RESECTION - SMALL BOWEL N/A 9/2/2014    Performed by Gonzalez Silvestre MD at [facility] OR 2ND FLR    TOE SURGERY      TONSILLECTOMY      TRIGGER FINGER RELEASE         Family History   Problem Relation Age of Onset    Heart disease Father 50        Mi age 50    Colon cancer Father     Bladder Cancer Mother         non smoker    Cataracts Mother     Glaucoma Mother     Heart disease Mother     Hyperlipidemia Mother     Kidney disease Mother     Breast cancer Sister 79    Arthritis Daughter     Asthma Daughter     Depression Daughter     Hypertension Daughter     Stroke Daughter 40    Arthritis Brother     Colon cancer Brother 70    Alcohol abuse Brother     Parkinsonism Brother     Alcohol abuse Brother     Depression Daughter     Celiac disease Neg Hx     Cirrhosis Neg Hx     Colon polyps Neg Hx     Crohn's disease Neg Hx     Cystic fibrosis Neg Hx     Esophageal cancer Neg Hx     Hemochromatosis Neg Hx     Inflammatory bowel disease Neg Hx     Irritable bowel syndrome Neg Hx     Liver cancer Neg Hx     Liver disease Neg Hx     Rectal cancer Neg Hx     Stomach cancer Neg Hx     Ulcerative colitis Neg Hx     Eliazar's disease Neg Hx     Amblyopia Neg Hx     Blindness Neg Hx     Macular degeneration Neg Hx     Retinal detachment Neg Hx     Strabismus Neg Hx     Melanoma Neg Hx        Social History     Socioeconomic History    Marital status:      Spouse name: Not on file    Number of children: Not on file    Years of education: Not on file    Highest education level: Not on  file   Social Needs    Financial resource strain: Not on file    Food insecurity - worry: Not on file    Food insecurity - inability: Not on file    Transportation needs - medical: Not on file    Transportation needs - non-medical: Not on file   Occupational History    Not on file   Tobacco Use    Smoking status: Never Smoker    Smokeless tobacco: Never Used   Substance and Sexual Activity    Alcohol use: Yes     Alcohol/week: 3.0 oz     Types: 5 Glasses of wine per week     Comment: socially    Drug use: No    Sexual activity: No     Birth control/protection: Abstinence   Other Topics Concern    Are you pregnant or think you may be? Not Asked    Breast-feeding Not Asked   Social History Narrative    , lives alone.  Primary support are her children and friends.       Current Outpatient Medications on File Prior to Visit   Medication Sig Dispense Refill    atorvastatin (LIPITOR) 40 MG tablet Take 1 tablet (40 mg total) by mouth once daily. 90 tablet 1    betamethasone dipropionate (DIPROLENE) 0.05 % ointment Apply every Am x 2 weeks then twice weekly 45 g 3    blood sugar diagnostic Strp Dispense neil contour strips; test blood sugar once daily 100 strip 11    blood-glucose meter (CONTOUR METER) kit Please dispense [employer] ([employer]) Contour meter and supplies Use as instructed Test Blood sugar once daily 1 each 0    diphenhydrAMINE (BENADRYL) 25 mg capsule Take 1 each (25 mg total) by mouth nightly as needed for Itching, Allergies or Insomnia.  0    fluticasone (FLONASE) 50 mcg/actuation nasal spray 2 sprays by Each Nare route once daily.  0    FREESTYLE EFREN READER Misc TEST as directed  0    FREESTYLE EFREN SENSOR Kit   0    gabapentin (NEURONTIN) 600 MG tablet Take 1,200 mg by mouth 2 (two) times daily.       hydrocortisone 2.5 % cream   0    ipratropium (ATROVENT) 0.06 % nasal spray   0    levothyroxine (SYNTHROID) 100 MCG tablet TAKE 1 TABLET BY MOUTH EVERY MORNING 90 tablet 0     "linagliptin (TRADJENTA) 5 mg Tab tablet Take 1 tablet (5 mg total) by mouth once daily. 90 tablet 3    magnesium 30 mg Tab Take 500 capsules by mouth. Patient's taking magnesium 500mg QD      meloxicam (MOBIC) 15 MG tablet TAKE 1 TABLET BY MOUTH DAILY WITH FOOD AS NEEDED FOR PAIN OR ARTHRITIS 90 tablet 0    metFORMIN (GLUCOPHAGE-XR) 750 MG 24 hr tablet Take 1 tablet (750 mg total) by mouth 2 (two) times daily with meals. 60 tablet 11    metronidazole 0.75% (METROCREAM) 0.75 % Crea   0    mirabegron 50 mg Tb24 Take 1 tablet (50 mg total) by mouth once daily. 30 tablet 11    ondansetron (ZOFRAN) 4 MG tablet Take 1 tablet (4 mg total) by mouth every 8 (eight) hours as needed for Nausea. 12 tablet 0    ospemifene (OSPHENA) 60 mg Tab Take 60 mg by mouth once daily. 90 tablet 1    pantoprazole (PROTONIX) 40 MG tablet TAKE 1 TABLET BY MOUTH ONCE DAILY 90 tablet 1    polyethylene glycol (GLYCOLAX) 17 gram/dose powder Take 17 g by mouth once daily.  0    sucralfate (CARAFATE) 1 gram tablet TAKE 1 TABLET(1 GRAM) BY MOUTH FOUR TIMES DAILY 90 tablet 0    triamcinolone acetonide 0.1% (KENALOG) 0.1 % paste apply to affected area with A Q-TIP three times a day  0    VIT A/VIT C/VIT E/ZINC/COPPER (ICAPS AREDS ORAL) Take by mouth 2 (two) times daily.      vitamin D 1000 units Tab Take 185 mg by mouth once daily. D3      azelastine (ASTELIN) 137 mcg nasal spray 1 spray (137 mcg total) by Nasal route 2 (two) times daily. 30 mL 1    conjugated estrogens (PREMARIN) vaginal cream Place 0.5 g vaginally every evening. 30 g 11    insulin glargine (LANTUS SOLOSTAR U-100 INSULIN) 100 unit/mL (3 mL) InPn pen Inject 10 Units into the skin every evening. (Patient taking differently: Inject 12 Units into the skin every evening. ) 15 mL 3    insulin syringe-needle U-100 0.3 mL 31 gauge x 15/64" Syrg 1 Syringe by Misc.(Non-Drug; Combo Route) route once daily. 30 Syringe 3    lancets (ONE TOUCH ULTRASOFT LANCETS) Misc Test blood " sugar once daily 200 each 11     Current Facility-Administered Medications on File Prior to Visit   Medication Dose Route Frequency Provider Last Rate Last Dose    amoxicillin capsule 500 mg  500 mg Oral Once Viridiana Valenzuela MD           Review of patient's allergies indicates:   Allergen Reactions    Codeine Nausea And Vomiting     Other reaction(s): Itching    Percocet  [oxycodone-acetaminophen]      Other reaction(s): Itching    Sulfa (sulfonamide antibiotics)      Other reaction(s): Nausea  Other reaction(s): Itching       Physical Exam:  General: Alert and Oriented x3, no distress. Well dressed and groomed, appears comfortable walking around the examining room in no distress.  Vitals:    12/05/18 1255   BP: 121/75   Pulse: 64     HEENT: Normocephalic, Atraumatic. No scleral icterus.  Lymph: No cervical lymphadenopathy  Resp: Good air entry bilaterally, no adventitious sounds.  Cardiac: S1 and S2 normal.  Abdomen: Normoactive bowel sounds. Non-distended. Normal tympany. Soft. Non-tender. No peritoneal signs. Multiple healed abdominal incisions. Abdomen is soft, non-bloated/distended. Non-tender to palpation. Localizes her tenderness just left of midline sam-umbilical.  Extremities: No peripheral edema. Normal bilateral pedal and radial pulses.  Neurologic: No gross neurological Deficits  Psych: Calm, cooperative. Normal mood and affect.    Laboratory:  Lab Results   Component Value Date     11/19/2018    K 3.9 11/19/2018     11/19/2018    CO2 23 11/19/2018    BUN 11 11/19/2018    CREATININE 0.8 11/19/2018    CALCIUM 8.7 11/19/2018    ANIONGAP 8 11/19/2018    ESTGFRAFRICA >60.0 11/19/2018    EGFRNONAA >60.0 11/19/2018       Lab Results   Component Value Date    ALT 25 11/15/2018    AST 33 11/15/2018    ALKPHOS 51 (L) 11/15/2018    BILITOT 0.7 11/15/2018       Lab Results   Component Value Date    WBC 4.92 11/19/2018    HGB 9.6 (L) 11/19/2018    HCT 31.2 (L) 11/19/2018    MCV 85 11/19/2018     PLT 96 (L) 11/19/2018       Microbiology:  - pending stool GI Pathogen Panel    Imaging:  CT AP (11/14/18)  - Redemonstration of extensive postsurgical change status post partial colectomy.  No evidence of high-grade bowel obstruction.    - Interval development of prominent collecting systems bilaterally suggesting some degree of hydronephrosis.  Bladder is significantly distended.    - Redemonstration of grade 2 anterolisthesis of L5 on S1 with associated osseous fusion.    - Status post hysterectomy and cholecystectomy.    Assessment:  Anayeli Judd is a 73 y.o. female who is presenting for post-hospitalization follow-up of abdominal pain.    Etiology of abdominal pain remains unclear. She did not report significant improvement with empirical treatment for SIBO. She had negative workup including EGD with gastric and duodenal biopsies. CT was unremarkable with exception of hydronephrosis, post-surgical changes. Of note, on further review of CT scan there is a hyperdense area in the LLQ which on review with radiology could represent a bone ?foreign body ingestion which was present on KUB on 11/15 but not present previously (5/2018) this could represent a cause for her pain. Given waxing and waning history of pain for years and associated with PO intake and post-prandial bloating this could be related to her IBS. No evidence on CT to support SBO as etiology.     Similarly the diarrhea has been a chronic issue and is of unclear etiology. She has a remote history of microscopic colitis but had negative biopsies on colonoscopy ~1 year ago and had had this issue concurrently. SIBO treatment without improvement. She has history of CCY which could indicate a post-CCY diarrhea (does not appear to have completed a trial of cholestyramine). Negative fecal fat - unlikely pancreatic exocrine insufficiency. This could represent IBS-D.      Plan:  1. Abdominal pain  1. Given ?foreign body ?bone on CT (11/14) and KUB (11/15)  which was not present previously (5/2018).  1. Will repeat scan to confirm presence / clearance  2. Will refer to General Surgery for further assistance/evaluation given small bowel localization.  2. Will discontinue sucralfate given lack of improvement in symptoms.  3. If GI evaluation remains negative can consider evaluation for alternative causes of her symptoms such as referred pain given spine changes on CT.  2. Pancreatic head changes  1. Pending EUS for further evaluation  2. Will check neuroendocrine markers given pancreatic head changes and diarrhea. Discussed will need to be off protonix for 2 weeks and fasting labs. She has no prior history of GI bleed or PUD to contraindicate holding the medication.  3. Diarrhea  1. Pending GI pathogen panel  2. Negative fecal fat, pending fecal elastase  3. If continues to be symptomatic of diarrhea, can give a trial of cholestyramine  4. Consider follow-up with CRS for further evaluation/management of fecal incontinence as was being evaluated in 2017 and appears more symptomatic at this time.  4. CRC Screening: 3 year follow-up (2020)  5. Follow-up if symptoms worsen or fail to improve.    Guerrero Betts MD  Gastroenterology Fellow (PGY IV)  Phone: [phone]  Pager: [phone]    Orders Placed This Encounter   Procedures    TRYPTASE    GASTRIN    CHROMOGRANIN A    VASOACTIVE INTES. POLYPEP 8112    SOMATOSTATIN    CALCITONIN

## 2018-12-06 ENCOUNTER — TELEPHONE (OUTPATIENT)
Dept: ENDOSCOPY | Facility: HOSPITAL | Age: 73
End: 2018-12-06

## 2018-12-06 LAB
GPP - ADENOVIRUS 40/41: NOT DETECTED
GPP - CAMPYLOBACTER: NOT DETECTED
GPP - CLOSTRIDIUM DIFFICILE TOXIN A/B: NOT DETECTED
GPP - CRYPTOSPORIDIUM: NOT DETECTED
GPP - E COLI O157: NOT DETECTED
GPP - ENTAMOEBA HISTOLYTICA: NOT DETECTED
GPP - ENTEROTOXIGENIC E COLI (ETEC): NOT DETECTED
GPP - GIARDIA LAMBLIA: NOT DETECTED
GPP - NOROVIRUS GI/GII: NOT DETECTED
GPP - ROTAVIRUS A: NOT DETECTED
GPP - SALMONELLA: NOT DETECTED
GPP - SHIGELLA: NOT DETECTED
GPP - VIBRIO CHOLERA: NOT DETECTED
GPP - YERSINIA ENTEROCOLITICA: NOT DETECTED
LACTATE PLASV-SCNC: NOT DETECTED MMOL/L

## 2018-12-06 NOTE — TELEPHONE ENCOUNTER
----- Message from Gifty Bryant sent at 12/6/2018 10:24 AM CST -----  Contact: Self- [phone]  Parvez Whitaker- pt returning missed call to schedule EUS- please contact pt at [phone]

## 2018-12-06 NOTE — PROGRESS NOTES
I was present with the fellow during the above evaluation, including history and exam.  I discussed the case with the fellow and agree with the findings and plan as documented in the fellow's note.

## 2018-12-07 ENCOUNTER — TELEPHONE (OUTPATIENT)
Dept: GASTROENTEROLOGY | Facility: CLINIC | Age: 73
End: 2018-12-07

## 2018-12-07 NOTE — TELEPHONE ENCOUNTER
----- Message from Guerrero Betts MD sent at 12/6/2018  9:46 AM CST -----  Please let Ms Judd know that her stool infectious panel was negative.  Thanks,  Dr. Betts

## 2018-12-10 ENCOUNTER — HOSPITAL ENCOUNTER (OUTPATIENT)
Dept: RADIOLOGY | Facility: HOSPITAL | Age: 73
Discharge: HOME | End: 2018-12-10
Attending: INTERNAL MEDICINE
Payer: MEDICARE

## 2018-12-10 ENCOUNTER — TELEPHONE (OUTPATIENT)
Dept: ENDOSCOPY | Facility: HOSPITAL | Age: 73
End: 2018-12-10

## 2018-12-10 DIAGNOSIS — T18.9XXA FOREIGN BODY IN DIGESTIVE TRACT, INITIAL ENCOUNTER: ICD-10-CM

## 2018-12-10 LAB — ELASTASE 1, FECAL: 255 MCG/G

## 2018-12-10 PROCEDURE — 74176 CT ABD & PELVIS W/O CONTRAST: CPT | Mod: TC

## 2018-12-10 PROCEDURE — 74176 CT ABD & PELVIS W/O CONTRAST: CPT | Mod: 26,,, | Performed by: RADIOLOGY

## 2018-12-10 NOTE — TELEPHONE ENCOUNTER
Spoke with patient about __1130___ arrival time. Clear liquids past 7pm the day before the procedure. NPO past midnight. Please take blood pressure, heart seizure medication the morning of the procedure. Hold all diabetic medication the morning of the procedure. Leave all valuable at home.  Please have a ride available the day of the procedure.

## 2018-12-10 NOTE — TELEPHONE ENCOUNTER
Your Upper EUS is scheduled for 12/12/2018 @ 1130am       At [facility] which is located at [address] [place] [address].  You will check in at the Hospital Admit Desk located on the 1st floor of the hospital. Please call the the office if you have any questions at [phone]      Upper Endoscopic Ultrasound    Endoscopic ultrasound(EUS) is a procedure used to image the digestive tract, including pancreas, lesions in esophagus and stomach.  It is used to diagnose and stage cancers of the digestive tract.  If necessary, your doctor may need to take samples during the procedure.    A responsible adult (family member or friend) must come with you and transport you home.  You are not allowed to drive, take a taxi or bus or leave the Endoscopy Center alone.  If you do not have a responsible adult with you to take you home, your exam will be cancelled.     If you have questions about the cost of your procedure, you should contact your insurance company as soon as possible.  Please bring a picture ID and your insurance card.  You will sign  treatment authorization forms at check in.  It is necessary for you to sign these forms again even if you recently signed these at the time of your clinic visit.    Please follow instructions of  if you are taking anticoagulant/blood thinning medications such as Aggrenox, aspirin, Brilinta, Effient, Eliquis, Lovenox, Plavix, Pletal, Pradaxa, Ticilid, Xarelto or Coumadin.     Take blood pressure, heart, anti-rejection and or seizure medication at 600 am the morning of the procedure.    Skip your morning dose of insulin or other oral medications for diabetes the morning of the procedure unless instructed otherwise by your doctor.      Day Before The Procedure    Eat a light evening meal and eat no solid food after 7 pm.  You may drink clear liquids including:    Water, Coffee or decaffeinated coffee (no milk or cream)  Tea, Herbal tea  Carbonated beverages (soft  drinks), regular and sugar free  Gelatin  Apple Juice, grape juice, white cranberry juice  Gatorade, Power Aid, Crystal Light, Bal Aid  Lemonade and Limeade  Bouillon, clear consomme'  Snowball, popsicles  DO NOT DRINK ANY LIQUIDS CONTAINING RED DYE  DO NOT DRINK ANY LIQUIDS NOT SPECIFICALLY LISTED  DO NOT DRINK ALCOHOL  NOTHING BY MOUTH AFTER MIDNIGHT    Day of Procedure    600 am take last dose of any medications you are allowed to take with a small amount of clear liquid

## 2018-12-11 ENCOUNTER — TELEPHONE (OUTPATIENT)
Dept: SURGERY | Facility: CLINIC | Age: 73
End: 2018-12-11

## 2018-12-11 NOTE — TELEPHONE ENCOUNTER
----- Message from Salima Guaman sent at 12/11/2018  3:50 PM CST -----  Regarding: Sooner appt  Good afternoon,    Patient is currently scheduled for next week but has a foreign body in her intestine and Dr. Mc is requesting for a sooner appt if possible. Please advise.    Thanks!  Salima

## 2018-12-11 NOTE — TELEPHONE ENCOUNTER
Phoned patient to see if she can come on Friday to see Dr Fletcher at the [facility].  She said that she could so appointment scheduled for 10:15 AM.  Address and phone number to [facility] given to the patient per phone and she verbalized understanding.

## 2018-12-12 ENCOUNTER — HOSPITAL ENCOUNTER (OUTPATIENT)
Facility: HOSPITAL | Age: 73
Discharge: HOME | End: 2018-12-12
Attending: INTERNAL MEDICINE | Admitting: INTERNAL MEDICINE
Payer: MEDICARE

## 2018-12-12 ENCOUNTER — ANESTHESIA (OUTPATIENT)
Dept: ENDOSCOPY | Facility: HOSPITAL | Age: 73
End: 2018-12-12
Payer: MEDICARE

## 2018-12-12 ENCOUNTER — ANESTHESIA EVENT (OUTPATIENT)
Dept: ENDOSCOPY | Facility: HOSPITAL | Age: 73
End: 2018-12-12
Payer: MEDICARE

## 2018-12-12 VITALS
WEIGHT: 140 LBS | HEART RATE: 68 BPM | TEMPERATURE: 97 F | RESPIRATION RATE: 18 BRPM | SYSTOLIC BLOOD PRESSURE: 112 MMHG | DIASTOLIC BLOOD PRESSURE: 62 MMHG | BODY MASS INDEX: 25.76 KG/M2 | OXYGEN SATURATION: 100 % | HEIGHT: 62 IN

## 2018-12-12 DIAGNOSIS — R10.9 ABDOMINAL PAIN: ICD-10-CM

## 2018-12-12 DIAGNOSIS — R10.9 ABDOMINAL PAIN, UNSPECIFIED ABDOMINAL LOCATION: Primary | ICD-10-CM

## 2018-12-12 LAB — POCT GLUCOSE: 109 MG/DL (ref 70–110)

## 2018-12-12 PROCEDURE — 43235 EGD DIAGNOSTIC BRUSH WASH: CPT | Mod: ,,, | Performed by: INTERNAL MEDICINE

## 2018-12-12 PROCEDURE — 37000008 HC ANESTHESIA 1ST 15 MINUTES: Performed by: INTERNAL MEDICINE

## 2018-12-12 PROCEDURE — 43259 EGD US EXAM DUODENUM/JEJUNUM: CPT | Mod: 53 | Performed by: INTERNAL MEDICINE

## 2018-12-12 PROCEDURE — 37000009 HC ANESTHESIA EA ADD 15 MINS: Performed by: INTERNAL MEDICINE

## 2018-12-12 PROCEDURE — 25000003 PHARM REV CODE 250: Performed by: NURSE ANESTHETIST, CERTIFIED REGISTERED

## 2018-12-12 PROCEDURE — 82962 GLUCOSE BLOOD TEST: CPT | Performed by: INTERNAL MEDICINE

## 2018-12-12 PROCEDURE — 25000003 PHARM REV CODE 250: Performed by: INTERNAL MEDICINE

## 2018-12-12 PROCEDURE — 63600175 PHARM REV CODE 636 W HCPCS: Performed by: NURSE ANESTHETIST, CERTIFIED REGISTERED

## 2018-12-12 RX ORDER — HYDROMORPHONE HYDROCHLORIDE 2 MG/ML
0.2 INJECTION, SOLUTION INTRAMUSCULAR; INTRAVENOUS; SUBCUTANEOUS EVERY 5 MIN PRN
Status: DISCONTINUED | OUTPATIENT
Start: 2018-12-12 | End: 2018-12-12 | Stop reason: HOSPADM

## 2018-12-12 RX ORDER — PHENYLEPHRINE HYDROCHLORIDE 10 MG/ML
INJECTION INTRAVENOUS
Status: DISCONTINUED | OUTPATIENT
Start: 2018-12-12 | End: 2018-12-12

## 2018-12-12 RX ORDER — LIDOCAINE HCL/PF 100 MG/5ML
SYRINGE (ML) INTRAVENOUS
Status: DISCONTINUED | OUTPATIENT
Start: 2018-12-12 | End: 2018-12-12

## 2018-12-12 RX ORDER — FENTANYL CITRATE 50 UG/ML
25 INJECTION, SOLUTION INTRAMUSCULAR; INTRAVENOUS EVERY 5 MIN PRN
Status: DISCONTINUED | OUTPATIENT
Start: 2018-12-12 | End: 2018-12-12 | Stop reason: HOSPADM

## 2018-12-12 RX ORDER — PROPOFOL 10 MG/ML
VIAL (ML) INTRAVENOUS
Status: DISCONTINUED | OUTPATIENT
Start: 2018-12-12 | End: 2018-12-12

## 2018-12-12 RX ORDER — SODIUM CHLORIDE 0.9 % (FLUSH) 0.9 %
3 SYRINGE (ML) INJECTION
Status: DISCONTINUED | OUTPATIENT
Start: 2018-12-12 | End: 2018-12-12 | Stop reason: HOSPADM

## 2018-12-12 RX ORDER — DIPHENHYDRAMINE HYDROCHLORIDE 50 MG/ML
25 INJECTION INTRAMUSCULAR; INTRAVENOUS EVERY 6 HOURS PRN
Status: DISCONTINUED | OUTPATIENT
Start: 2018-12-12 | End: 2018-12-12 | Stop reason: HOSPADM

## 2018-12-12 RX ORDER — ONDANSETRON 2 MG/ML
4 INJECTION INTRAMUSCULAR; INTRAVENOUS ONCE AS NEEDED
Status: DISCONTINUED | OUTPATIENT
Start: 2018-12-12 | End: 2018-12-12 | Stop reason: HOSPADM

## 2018-12-12 RX ORDER — PROPOFOL 10 MG/ML
VIAL (ML) INTRAVENOUS CONTINUOUS PRN
Status: DISCONTINUED | OUTPATIENT
Start: 2018-12-12 | End: 2018-12-12

## 2018-12-12 RX ORDER — MEPERIDINE HYDROCHLORIDE 50 MG/ML
12.5 INJECTION INTRAMUSCULAR; INTRAVENOUS; SUBCUTANEOUS ONCE AS NEEDED
Status: DISCONTINUED | OUTPATIENT
Start: 2018-12-12 | End: 2018-12-12 | Stop reason: HOSPADM

## 2018-12-12 RX ORDER — SODIUM CHLORIDE 9 MG/ML
INJECTION, SOLUTION INTRAVENOUS CONTINUOUS
Status: DISCONTINUED | OUTPATIENT
Start: 2018-12-12 | End: 2018-12-12 | Stop reason: HOSPADM

## 2018-12-12 RX ADMIN — PHENYLEPHRINE HYDROCHLORIDE 200 MCG: 10 INJECTION INTRAVENOUS at 02:12

## 2018-12-12 RX ADMIN — TOPICAL ANESTHETIC 1 EACH: 200 SPRAY DENTAL; PERIODONTAL at 01:12

## 2018-12-12 RX ADMIN — SODIUM CHLORIDE: 0.9 INJECTION, SOLUTION INTRAVENOUS at 12:12

## 2018-12-12 RX ADMIN — PROPOFOL 50 MG: 10 INJECTION, EMULSION INTRAVENOUS at 01:12

## 2018-12-12 RX ADMIN — LIDOCAINE HYDROCHLORIDE 100 MG: 20 INJECTION, SOLUTION INTRAVENOUS at 01:12

## 2018-12-12 RX ADMIN — PROPOFOL 120 MCG/KG/MIN: 10 INJECTION, EMULSION INTRAVENOUS at 01:12

## 2018-12-12 NOTE — ANESTHESIA POSTPROCEDURE EVALUATION
"Anesthesia Post Evaluation    Patient: Anayeli Judd    Procedure(s) Performed: Procedure(s) (LRB):  ULTRASOUND, UPPER GI TRACT, ENDOSCOPIC (N/A)    Final Anesthesia Type: MAC  Patient location during evaluation: GI PACU  Patient participation: Yes- Able to Participate  Level of consciousness: awake and alert and oriented  Post-procedure vital signs: reviewed and stable  Pain management: adequate  Airway patency: patent  PONV status at discharge: No PONV  Anesthetic complications: no      Cardiovascular status: stable, hemodynamically stable and blood pressure returned to baseline  Respiratory status: room air, unassisted and spontaneous ventilation  Hydration status: euvolemic  Follow-up not needed.        Visit Vitals  BP (!) 129/58 (BP Location: Left arm, Patient Position: Lying)   Pulse 71   Temp 36.3 °C (97.3 °F) (Tympanic)   Resp 16   Ht 5' 2" (1.575 m)   Wt 63.5 kg (140 lb)   LMP  (LMP Unknown)   SpO2 97%   Breastfeeding? No   BMI 25.61 kg/m²       Pain/Roscoe Score: No Data Recorded      "

## 2018-12-12 NOTE — PLAN OF CARE
Recovery complete. Patient recovered to baseline. Discharge instructions reviewed with patient and her granddaughter. VSS and no complaints of pain or discomfort at this time.

## 2018-12-12 NOTE — PROVATION PATIENT INSTRUCTIONS
Discharge Summary/Instructions after an Endoscopic Procedure  Patient Name: Anayeli Judd  Patient MRN: [id number]  Patient YOB: 1945 Wednesday, December 12, 2018  Jose Hess MD  RESTRICTIONS:  During your procedure today, you received medications for sedation.  These   medications may affect your judgment, balance and coordination.  Therefore,   for 24 hours, you have the following restrictions:   - DO NOT drive a car, operate machinery, make legal/financial decisions,   sign important papers or drink alcohol.    ACTIVITY:  Today: no heavy lifting, straining or running due to procedural   sedation/anesthesia.  The following day: return to full activity including work.  DIET:  Eat and drink normally unless instructed otherwise.     TREATMENT FOR COMMON SIDE EFFECTS:  - Mild abdominal pain, nausea, belching, bloating or excessive gas:  rest,   eat lightly and use a heating pad.  - Sore Throat: treat with throat lozenges and/or gargle with warm salt   water.  - Because air was used during the procedure, expelling large amounts of air   from your rectum or belching is normal.  - If a bowel prep was taken, you may not have a bowel movement for 1-3 days.    This is normal.  SYMPTOMS TO WATCH FOR AND REPORT TO YOUR PHYSICIAN:  1. Abdominal pain or bloating, other than gas cramps.  2. Chest pain.  3. Back pain.  4. Signs of infection such as: chills or fever occurring within 24 hours   after the procedure.  5. Rectal bleeding, which would show as bright red, maroon, or black stools.   (A tablespoon of blood from the rectum is not serious, especially if   hemorrhoids are present.)  6. Vomiting.  7. Weakness or dizziness.  GO DIRECTLY TO THE NEAREST EMERGENCY ROOM IF YOU HAVE ANY OF THE FOLLOWING:      Difficulty breathing              Chills and/or fever over 101 F   Persistent vomiting and/or vomiting blood   Severe abdominal pain   Severe chest pain   Black, tarry stools   Bleeding- more than one  tablespoon   Any other symptom or condition that you feel may need urgent attention  Your doctor recommends these additional instructions:  If any biopsies were taken, your doctors clinic will contact you in 1 to 2   weeks with any results.  - Discharge patient to home (ambulatory).   - Repeat the upper endoscopic ultrasound at appointment to be scheduled   Unable to passed the echoendoscope at [facility].  For questions, problems or results please call your physician - Jose Hess MD at Work:  [phone].  EMERGENCY PHONE NUMBER: [phone],  LAB RESULTS: [phone]  IF A COMPLICATION OR EMERGENCY SITUATION ARISES AND YOU ARE UNABLE TO REACH   YOUR PHYSICIAN - GO DIRECTLY TO THE EMERGENCY ROOM.  Jose Hess MD  12/12/2018 2:26:58 PM  This report has been verified and signed electronically.  [employer]

## 2018-12-12 NOTE — H&P
"History & Physical - Short Stay  Gastroenterology      SUBJECTIVE:     Procedure: EUS    Chief Complaint/Indication for Procedure: Abdominal Pain    History of Present Illness:  Patient is a 73 y.o. female presents with chronic abdominal pain here for endosonographic evaluation.     Facility-Administered Medications Prior to Admission   Medication    amoxicillin capsule 500 mg     PTA Medications   Medication Sig    atorvastatin (LIPITOR) 40 MG tablet Take 1 tablet (40 mg total) by mouth once daily.    azelastine (ASTELIN) 137 mcg nasal spray 1 spray (137 mcg total) by Nasal route 2 (two) times daily.    betamethasone dipropionate (DIPROLENE) 0.05 % ointment Apply every Am x 2 weeks then twice weekly    blood sugar diagnostic Strp Dispense [employer] contour strips; test blood sugar once daily    blood-glucose meter (CONTOUR METER) kit Please dispense [employer] Contour meter and supplies Use as instructed Test Blood sugar once daily    conjugated estrogens (PREMARIN) vaginal cream Place 0.5 g vaginally every evening.    diphenhydrAMINE (BENADRYL) 25 mg capsule Take 1 each (25 mg total) by mouth nightly as needed for Itching, Allergies or Insomnia.    fluticasone (FLONASE) 50 mcg/actuation nasal spray 2 sprays by Each Nare route once daily.    FREESTYLE [place]FREN READER [facility] [place] as directed    FREESTYLE EFREN SENSOR Kit     gabapentin (NEURONTIN) 600 MG tablet Take 1,200 mg by mouth 2 (two) times daily.     hydrocortisone 2.5 % cream     insulin glargine (LANTUS SOLOSTAR U-100 INSULIN) 100 unit/mL (3 mL) InPn pen Inject 10 Units into the skin every evening. (Patient taking differently: Inject 12 Units into the skin every evening. )    insulin syringe-needle U-100 0.3 mL 31 gauge x 15/64" Syrg 1 Syringe by Misc.(Non-Drug; Combo Route) route once daily.    ipratropium (ATROVENT) 0.06 % nasal spray     lancets (ONE TOUCH ULTRASOFT LANCETS) Misc Test blood sugar once daily    levothyroxine (SYNTHROID) 100 MCG " tablet TAKE 1 TABLET BY MOUTH EVERY MORNING    linagliptin (TRADJENTA) 5 mg Tab tablet Take 1 tablet (5 mg total) by mouth once daily.    magnesium 30 mg Tab Take 500 capsules by mouth. Patient's taking magnesium 500mg QD    meloxicam (MOBIC) 15 MG tablet TAKE 1 TABLET BY MOUTH DAILY WITH FOOD AS NEEDED FOR PAIN OR ARTHRITIS    metFORMIN (GLUCOPHAGE-XR) 750 MG 24 hr tablet Take 1 tablet (750 mg total) by mouth 2 (two) times daily with meals.    metronidazole 0.75% (METROCREAM) 0.75 % Crea     mirabegron 50 mg Tb24 Take 1 tablet (50 mg total) by mouth once daily.    ondansetron (ZOFRAN) 4 MG tablet Take 1 tablet (4 mg total) by mouth every 8 (eight) hours as needed for Nausea.    ospemifene (OSPHENA) 60 mg Tab Take 60 mg by mouth once daily.    pantoprazole (PROTONIX) 40 MG tablet TAKE 1 TABLET BY MOUTH ONCE DAILY    polyethylene glycol (GLYCOLAX) 17 gram/dose powder Take 17 g by mouth once daily.    sucralfate (CARAFATE) 1 gram tablet TAKE 1 TABLET(1 GRAM) BY MOUTH FOUR TIMES DAILY    triamcinolone acetonide 0.1% (KENALOG) 0.1 % paste apply to affected area with A Q-TIP three times a day    VIT A/VIT C/VIT E/ZINC/COPPER (ICAPS AREDS ORAL) Take by mouth 2 (two) times daily.    vitamin D 1000 units Tab Take 185 mg by mouth once daily. D3       Review of patient's allergies indicates:   Allergen Reactions    Codeine Nausea And Vomiting     Other reaction(s): Itching    Percocet  [oxycodone-acetaminophen]      Other reaction(s): Itching    Sulfa (sulfonamide antibiotics)      Other reaction(s): Nausea  Other reaction(s): Itching        Past Medical History:   Diagnosis Date    Allergic rhinitis     Arthritis     Blood transfusion     during delivery and     Bowel obstruction     Cervical radiculopathy     followed by dr cloud    Colon cancer     transverse colon; resected; Stage IIA (pT3 pN0 MX)    Diarrhea     Family history of breast cancer     Family history of colon cancer      Fatty liver     GERD (gastroesophageal reflux disease)     History of shingles     Hyperlipidemia     Hypothyroidism     Irritable bowel syndrome     Microscopic colitis     treated 2013    Raynaud phenomenon     Raynaud's disease     Type 2 diabetes mellitus      Past Surgical History:   Procedure Laterality Date    APPENDECTOMY      BACK SURGERY      CARPAL TUNNEL RELEASE      bilateral      SECTION      CHOLECYSTECTOMY  1965    COLECTOMY  2011    Transverse colon resection by Dr. Aguirre    COLONOSCOPY N/A 2017    Procedure: COLONOSCOPY;  Surgeon: Manjit Alvarez MD;  Location: [facility] (4TH FLR);  Service: Endoscopy;  Laterality: N/A;    COLONOSCOPY N/A 2017    Performed by Manjit [facility] MD at [facility] ENDO (4TH FLR)    COLONOSCOPY N/A 2013    Performed by Angelo Reynolds MD at [facility] ENDO (4TH FLR)    EGD (ESOPHAGOGASTRODUODENOSCOPY) N/A 2018    Performed by Angelo Reynolds MD at [facility] (2ND FLR)    ESOPHAGOGASTRODUODENOSCOPY N/A 2018    Procedure: EGD (ESOPHAGOGASTRODUODENOSCOPY);  Surgeon: Angelo Reynolds MD;  Location: [facility] (2ND FLR);  Service: Endoscopy;  Laterality: N/A;    EXPLORATORY-LAPAROTOMY N/A 2017    Performed by Herman Fletcher MD at [facility] OR 2ND FLR    EXPLORATORY-LAPAROTOMY N/A 2014    Performed by Gonzalez Silvestre MD at [facility] OR 2ND FLR    EYE SURGERY      Cataract Removal    HYSTERECTOMY      LYSIS-ADHESION N/A 2017    Performed by Herman Fletcher MD at [facility] OR 2ND FLR    LYSIS-ADHESION N/A 2014    Performed by Gonzalez Silvestre MD at [facility] OR 2ND FLR    PLACEMENT  2017    Performed by Herman Fletcher MD at [facility] OR 2ND FLR    posterolateral fusion with autograft bone and [place] mineralized bone matrix  13    at [facility] for lumbar spine stenosis    REPAIR  2017    Performed by Herman Fletcher MD at [facility] OR 2ND FLR    RESECTION - SMALL BOWEL N/A 2014    Performed by  Gonzalez [facility] at [facility]    TOE SURGERY      TONSILLECTOMY      TRIGGER FINGER RELEASE       Family History   Problem Relation Age of Onset    Heart disease Father 50        Mi age 50    Colon cancer Father     Bladder Cancer Mother         non smoker    Cataracts Mother     Glaucoma Mother     Heart disease Mother     Hyperlipidemia Mother     Kidney disease Mother     Breast cancer Sister 79    Arthritis Daughter     Asthma Daughter     Depression Daughter     Hypertension Daughter     Stroke Daughter 40    Arthritis Brother     Colon cancer Brother 70    Alcohol abuse Brother     Parkinsonism Brother     Alcohol abuse Brother     Depression Daughter     Celiac disease Neg Hx     Cirrhosis Neg Hx     Colon polyps Neg Hx     Crohn's disease Neg Hx     Cystic fibrosis Neg Hx     Esophageal cancer Neg Hx     Hemochromatosis Neg Hx     Inflammatory bowel disease Neg Hx     Irritable bowel syndrome Neg Hx     Liver cancer Neg Hx     Liver disease Neg Hx     Rectal cancer Neg Hx     Stomach cancer Neg Hx     Ulcerative colitis Neg Hx     Eliazar's disease Neg Hx     Amblyopia Neg Hx     Blindness Neg Hx     Macular degeneration Neg Hx     Retinal detachment Neg Hx     Strabismus Neg Hx     Melanoma Neg Hx      Social History     Tobacco Use    Smoking status: Never Smoker    Smokeless tobacco: Never Used   Substance Use Topics    Alcohol use: Yes     Alcohol/week: 3.0 oz     Types: 5 Glasses of wine per week     Comment: socially    Drug use: No       Review of Systems:  Gastrointestinal: positive for abdominal pain, change in bowel habits and nausea    OBJECTIVE:     Vital Signs (Most Recent)       Physical Exam:  General: well developed, well nourished  Lungs:  clear to auscultation bilaterally and normal respiratory effort  Heart: regular rate, S1, S2 normal  Abdomen: soft, non-tender non-distented; bowel sounds normal; no masses,  no  organomegaly    Laboratory  CBC: No results for input(s): WBC, RBC, HGB, HCT, PLT, MCV, MCH, MCHC in the last 168 hours.  CMP: No results for input(s): GLU, CALCIUM, ALBUMIN, PROT, NA, K, CO2, CL, BUN, CREATININE, ALKPHOS, ALT, AST, BILITOT in the last 168 hours.  Coagulation: No results for input(s): LABPROT, INR, APTT in the last 168 hours.      Diagnostic Results:      ASSESSMENT/PLAN:     Abdominal pain    Plan: EUS    Anesthesia Plan: MAC    ASA Grade: ASA 3 - Patient with moderate systemic disease with functional limitations     The impression and plan was discussed in detail with the patient and family. All questions have been answered and the patient voices understanding of our plan at this point. The risk of the procedure was discussed in detail which includes but not limited to bleeding, infection, perforation in some cases requiring surgery with its spectrum of complications.

## 2018-12-12 NOTE — ANESTHESIA PREPROCEDURE EVALUATION
2018  Anayeli Judd is a 73 y.o., female EUS    Review of patient's allergies indicates:   Allergen Reactions    Codeine Nausea And Vomiting     Other reaction(s): Itching    Percocet  [oxycodone-acetaminophen]      Other reaction(s): Itching    Sulfa (sulfonamide antibiotics)      Other reaction(s): Nausea  Other reaction(s): Itching     Past Medical History:   Diagnosis Date    Allergic rhinitis     Arthritis     Blood transfusion     during delivery and     Bowel obstruction     Cervical radiculopathy     followed by dr reynolds    Colon cancer     transverse colon; resected; Stage IIA (pT3 pN0 MX)    Diarrhea     Family history of breast cancer     Family history of colon cancer     Fatty liver     GERD (gastroesophageal reflux disease)     History of shingles     Hyperlipidemia     Hypothyroidism     Irritable bowel syndrome     Microscopic colitis     treated     Raynaud phenomenon     Raynaud's disease     Type 2 diabetes mellitus      Past Surgical History:   Procedure Laterality Date    APPENDECTOMY      BACK SURGERY      CARPAL TUNNEL RELEASE      bilateral      SECTION      CHOLECYSTECTOMY  1965    COLECTOMY  2011    Transverse colon resection by Dr. Aguirre    COLONOSCOPY N/A 2017    Procedure: COLONOSCOPY;  Surgeon: Manjit Alvarez MD;  Location: [facility] (4TH FLR);  Service: Endoscopy;  Laterality: N/A;    COLONOSCOPY N/A 2017    Performed by Manjit Alvarez MD at [facility] (4TH FLR)    COLONOSCOPY N/A 2013    Performed by Angelo Reynolds MD at [facility] (4TH FLR)    EGD (ESOPHAGOGASTRODUODENOSCOPY) N/A 2018    Performed by Angelo Reynolds MD at [facility] (2ND FLR)    ESOPHAGOGASTRODUODENOSCOPY N/A 2018    Procedure: EGD (ESOPHAGOGASTRODUODENOSCOPY);  Surgeon: Angelo Reynolds MD;  Location:  [facility] ENDO (2ND FLR);  Service: Endoscopy;  Laterality: N/A;    EXPLORATORY-LAPAROTOMY N/A 4/1/2017    Performed by Herman Fletcher MD at [facility] OR 2ND FLR    EXPLORATORY-LAPAROTOMY N/A 9/2/2014    [facility]    EYE SURGERY      Cataract Removal    HYSTERECTOMY      LYSIS-ADHESION N/A 4/1/2017    Performed by Herman Fletcher MD at [facility] OR 2ND FLR    LYSIS-ADHESION N/A 9/2/2014    Performed by Gonzalez Silvestre MD at [facility] OR 2ND FLR    PLACEMENT  4/1/2017    Performed by Herman Fletcher MD at [facility] OR 2ND FLR    posterolateral fusion with autograft bone and [place] mineralized bone matrix  2/1/13    at [facility] for lumbar spine stenosis    REPAIR  4/1/2017    [facility]    RESECTION - SMALL BOWEL N/A 9/2/2014    [facility]    TOE SURGERY      TONSILLECTOMY      TRIGGER FINGER RELEASE       Patient Active Problem List   Diagnosis    Lumbar spondylosis    Cervical spondylosis with radiculopathy    Insomnia    Carpal tunnel syndrome    Headache(784.0)    Hypothyroid    History of colon cancer    Irritable bowel syndrome    Chronic sinusitis of right maxilla    Diarrhea    Pelvic muscle wasting    GERD (gastroesophageal reflux disease)    Fatty liver    Partial small bowel obstruction    Family history of breast cancer    Numbness of face    Gait disturbance    Type 2 diabetes mellitus without complication, without long-term current use of insulin    Acute deep vein thrombosis (DVT) of upper extremity    Wound infection after surgery    Hematoma    Chronic diarrhea    Fecal incontinence    Abdominal pain    Nausea    Vitamin B12 deficiency    Vaginal atrophy    Incomplete emptying of bladder    Mixed stress and urge urinary incontinence    Vaginal irritation    Frequent UTI    Fecal soiling    Irregular bowel habits    Elevated serum  creatinine    Hypomagnesemia    Hydronephrosis    Acute cystitis without hematuria     Wt Readings from Last 3 Encounters:   12/05/18 65.6 kg (144 lb 10 oz)   11/26/18 65.2 kg (143 lb 11.8 oz)   11/26/18 64.9 kg (143 lb)     Temp Readings from Last 3 Encounters:   11/26/18 36.7 °C (98.1 °F) (Oral)   11/26/18 36.4 °C (97.6 °F)   11/19/18 36.7 °C (98 °F) (Oral)     BP Readings from Last 3 Encounters:   12/05/18 121/75   11/26/18 (!) 110/56   11/26/18 112/64     Pulse Readings from Last 3 Encounters:   12/05/18 64   11/26/18 69   11/26/18 66         Anesthesia Evaluation    I have reviewed the Patient Summary Reports.        Review of Systems        Lab Results   Component Value Date    WBC 4.92 11/19/2018    HGB 9.6 (L) 11/19/2018    HCT 31.2 (L) 11/19/2018    MCV 85 11/19/2018    PLT 96 (L) 11/19/2018         Anesthesia Plan  Type of Anesthesia, risks & benefits discussed:  Anesthesia Type:  MAC  Patient's Preference:   Intra-op Monitoring Plan:   Intra-op Monitoring Plan Comments:   Post Op Pain Control Plan:   Post Op Pain Control Plan Comments:   Induction:   IV  Beta Blocker:         Informed Consent: Patient understands risks and agrees with Anesthesia plan.  Questions answered. Anesthesia consent signed with patient.  ASA Score: 2     Day of Surgery Review of History & Physical: I have interviewed and examined the patient. I have reviewed the patient's H&P dated:  There are no significant changes.  H&P update referred to the provider.  H&P completed by Anesthesiologist.       Ready For Surgery From Anesthesia Perspective.

## 2018-12-12 NOTE — DISCHARGE SUMMARY
Discharge Summary/Instructions after an Endoscopic Procedure    Patient Name: Anayeli Judd  Patient MRN: [id number]  Patient YOB: 1945 Wednesday, December 12, 2018  Jose Hess MD    RESTRICTIONS:  During your procedure today, you received medications for sedation.  These medications may affect your judgment, balance and coordination.  Therefore, for 24 hours, you have the following restrictions:     - DO NOT drive a car, operate machinery, make legal/financial decisions, sign important papers or drink alcohol.      ACTIVITY:  Today: no heavy lifting, straining or running due to procedural sedation/anesthesia.  The following day: return to full activity including work.    DIET:  Eat and drink normally unless instructed otherwise.     TREATMENT FOR COMMON SIDE EFFECTS:  - Mild abdominal pain, nausea, belching, bloating or excessive gas:  rest, eat lightly and use a heating pad.  - Sore Throat: treat with throat lozenges and/or gargle with warm salt water.  - Because air was used during the procedure, expelling large amounts of air from your rectum or belching is normal.  - If a bowel prep was taken, you may not have a bowel movement for 1-3 days.  This is normal.      SYMPTOMS TO WATCH FOR AND REPORT TO YOUR PHYSICIAN:  1. Abdominal pain or bloating, other than gas cramps.  2. Chest pain.  3. Back pain.  4. Signs of infection such as: chills or fever occurring within 24 hours after the procedure.  5. Rectal bleeding, which would show as bright red, maroon, or black stools. (A tablespoon of blood from the rectum is not serious, especially if hemorrhoids are present.)  6. Vomiting.  7. Weakness or dizziness.      GO DIRECTLY TO THE NEAREST EMERGENCY ROOM IF YOU HAVE ANY OF THE FOLLOWING:     Difficulty breathing              Chills and/or fever over 101 F   Persistent vomiting and/or vomiting blood   Severe abdominal pain   Severe chest pain   Black, tarry stools   Bleeding- more than one tablespoon   Any  other symptom or condition that you feel may need urgent attention    Your doctor recommends these additional instructions:  If any biopsies were taken, your doctors clinic will contact you in 1 to 2 weeks with any results.    - Discharge patient to home (ambulatory).   - Repeat the upper endoscopic ultrasound at appointment to be scheduled Unable to passed the echoendoscope at [facility].    For questions, problems or results please call your physician - Jose Hess MD at Work:  [phone].    EMERGENCY PHONE NUMBER: [phone],  LAB RESULTS: [phone]    IF A COMPLICATION OR EMERGENCY SITUATION ARISES AND YOU ARE UNABLE TO REACH YOUR PHYSICIAN - GO DIRECTLY TO THE EMERGENCY ROOM.

## 2018-12-12 NOTE — TRANSFER OF CARE
"Anesthesia Transfer of Care Note    Patient: Anayeli Judd    Procedure(s) Performed: Procedure(s) (LRB):  ULTRASOUND, UPPER GI TRACT, ENDOSCOPIC (N/A)    Patient location: GI    Anesthesia Type: MAC    Transport from OR: Transported from OR on room air with adequate spontaneous ventilation    Post pain: adequate analgesia    Post assessment: no apparent anesthetic complications    Post vital signs: stable    Level of consciousness: sedated    Nausea/Vomiting: no nausea/vomiting    Complications: none    Transfer of care protocol was followed      Last vitals:   Visit Vitals  BP (!) 129/58 (BP Location: Left arm, Patient Position: Lying)   Pulse 71   Temp 36.3 °C (97.3 °F) (Tympanic)   Resp 16   Ht 5' 2" (1.575 m)   Wt 63.5 kg (140 lb)   LMP  (LMP Unknown)   SpO2 97%   Breastfeeding? No   BMI 25.61 kg/m²     "

## 2018-12-13 ENCOUNTER — TELEPHONE (OUTPATIENT)
Dept: ENDOSCOPY | Facility: HOSPITAL | Age: 73
End: 2018-12-13

## 2018-12-13 DIAGNOSIS — R10.9 ABDOMINAL PAIN, UNSPECIFIED ABDOMINAL LOCATION: Primary | ICD-10-CM

## 2018-12-14 ENCOUNTER — OFFICE VISIT (OUTPATIENT)
Dept: SURGERY | Facility: CLINIC | Age: 73
End: 2018-12-14
Payer: MEDICARE

## 2018-12-14 VITALS
BODY MASS INDEX: 26.01 KG/M2 | DIASTOLIC BLOOD PRESSURE: 71 MMHG | TEMPERATURE: 98 F | WEIGHT: 142.19 LBS | SYSTOLIC BLOOD PRESSURE: 124 MMHG | HEART RATE: 62 BPM

## 2018-12-14 DIAGNOSIS — R10.84 GENERALIZED ABDOMINAL PAIN: Primary | ICD-10-CM

## 2018-12-14 PROCEDURE — 99999 PR PBB SHADOW E&M-EST. PATIENT-LVL III: CPT | Mod: PBBFAC,,, | Performed by: SURGERY

## 2018-12-14 PROCEDURE — 99213 OFFICE O/P EST LOW 20 MIN: CPT | Mod: PBBFAC,PO | Performed by: SURGERY

## 2018-12-14 PROCEDURE — 99211 OFF/OP EST MAY X REQ PHY/QHP: CPT | Mod: S$PBB,,, | Performed by: SURGERY

## 2018-12-14 NOTE — PROGRESS NOTES
Patient returns for evaluation  She is being worked up for gastrinoma  Is off PPI until next week for neuroendocrine work up  Her nausea is worse without her PPI and hopefully this can be restarted after her lab draw next week  Her recent EGD was normal  She is scheduled for an EUS  She has lost 7 lbs since July  While I have no doubt that her nausea and pain are interfering with her daily life, I don't think that there is an operation that will improve this  He prior operation and recovery was very challenging and without clear evidence of a surgical problem (e.g obstruction) I would not operate  I have looked at her CT and do not know the significance of the finding in the left lower quadrant that might represent a foreign body  In any event it is not obstructing and I do not think that surgical intervention would be indicated at this time

## 2018-12-14 NOTE — LETTER
December 14, 2018      Rocio Mc MD  [address]  [address]  [place] [address]           [facility]  [address]  [place] [address]  Phone: [phone]  Fax: [phone]          Patient: Anayeli Judd   MR Number: [id number]   YOB: 1945   Date of Visit: 12/14/2018       Dear Dr. Rocio Mc:    Thank you for referring Anayeli Judd to me for evaluation. Attached you will find relevant portions of my assessment and plan of care.    If you have questions, please do not hesitate to call me. I look forward to following Anayeli Judd along with you.    Sincerely,    Herman Fletcher MD    Enclosure  CC:  No Recipients    If you would like to receive this communication electronically, please contact [email or web address] or [phone] to request more information on [employer] Link access.    For providers and/or their staff who would like to refer a patient to [facility], please contact us through our one-stop-shop provider referral line, [facility], at [phone].    If you feel you have received this communication in error or would no longer like to receive these types of communications, please e-mail [email or web address]

## 2018-12-17 ENCOUNTER — TELEPHONE (OUTPATIENT)
Dept: GASTROENTEROLOGY | Facility: CLINIC | Age: 73
End: 2018-12-17

## 2018-12-17 ENCOUNTER — TELEPHONE (OUTPATIENT)
Dept: ENDOSCOPY | Facility: HOSPITAL | Age: 73
End: 2018-12-17

## 2018-12-17 NOTE — TELEPHONE ENCOUNTER
Post procedure instructions. Repeat the upper endoscopic ultrasound at appointment to be scheduled Unable to passed the echoendoscope at [facility].

## 2018-12-17 NOTE — TELEPHONE ENCOUNTER
----- Message from Jose Hess MD sent at 12/17/2018  4:43 PM CST -----  Yes  R  ----- Message -----  From: Lisa Viveros MA  Sent: 12/16/2018   9:24 PM  To: Jose Hess MD    Can her EUS wait until after the first of the year?

## 2018-12-18 ENCOUNTER — TELEPHONE (OUTPATIENT)
Dept: GASTROENTEROLOGY | Facility: CLINIC | Age: 73
End: 2018-12-18

## 2018-12-18 NOTE — TELEPHONE ENCOUNTER
----- Message from Guerrero Betts MD sent at 12/12/2018  5:38 PM CST -----  Janie Sanchez,  I had discussed with her during the clinic appointment that we weren't going to be prescribing that medication for now due to potential side effects given her urinary symptoms. Will hold off for now additionally as waiting for surgical evaluation for cause of her pain.  Thanks,  Nahid  ----- Message -----  From: Richelle Saenz MA  Sent: 12/11/2018  10:22 AM  To: Guerrero Betts MD    Patient aware and expressed understanding.  Stated she never received the Rx for pain.  Stated it was discussed and you were going to send in whatever it was she was given in the hospital to help the pain.  ----- Message -----  From: Guerrero Betts MD  Sent: 12/10/2018   8:09 PM  To: Ramiro Jefferson MD, [facility] Staff    Please let Ms Judd know that her other stool study that was pending (fecal elastase to evaluate for pancreatic function) is normal.  Thanks,  Dr. Betts

## 2018-12-19 ENCOUNTER — LAB VISIT (OUTPATIENT)
Dept: LAB | Facility: HOSPITAL | Age: 73
End: 2018-12-19
Attending: INTERNAL MEDICINE
Payer: MEDICARE

## 2018-12-19 DIAGNOSIS — R19.7 DIARRHEA, UNSPECIFIED TYPE: ICD-10-CM

## 2018-12-19 PROCEDURE — 82941 ASSAY OF GASTRIN: CPT

## 2018-12-19 PROCEDURE — 84307 ASSAY OF SOMATOSTATIN: CPT

## 2018-12-19 PROCEDURE — 36415 COLL VENOUS BLD VENIPUNCTURE: CPT | Mod: PO

## 2018-12-19 PROCEDURE — 86316 IMMUNOASSAY TUMOR OTHER: CPT

## 2018-12-20 ENCOUNTER — TELEPHONE (OUTPATIENT)
Dept: GASTROENTEROLOGY | Facility: CLINIC | Age: 73
End: 2018-12-20

## 2018-12-20 LAB
CALCIT SERPL-MCNC: <5 PG/ML
GASTRIN SERPL-MCNC: 18 PG/ML
TRYPTASE LEVEL: 6.9 NG/ML

## 2018-12-23 LAB — CGA SERPL-MCNC: 133 NG/ML (ref 0–95)

## 2018-12-27 ENCOUNTER — PATIENT MESSAGE (OUTPATIENT)
Dept: GASTROENTEROLOGY | Facility: CLINIC | Age: 73
End: 2018-12-27

## 2018-12-27 ENCOUNTER — TELEPHONE (OUTPATIENT)
Dept: GASTROENTEROLOGY | Facility: CLINIC | Age: 73
End: 2018-12-27

## 2018-12-27 ENCOUNTER — TELEPHONE (OUTPATIENT)
Dept: ENDOSCOPY | Facility: HOSPITAL | Age: 73
End: 2018-12-27

## 2018-12-27 NOTE — TELEPHONE ENCOUNTER
----- Message from Guerrero Betts MD sent at 12/22/2018 11:53 AM CST -----  Hi,  Please let Ms Judd know that her lab studies are negative (these were drawn to exclude a neuroendocrine tumor)  ThanksNahid

## 2018-12-28 LAB — VASOACTIVE INTESTINAL POLYPEPTIDE PLASMA: <50 PG/ML

## 2018-12-29 DIAGNOSIS — R19.7 DIARRHEA, UNSPECIFIED TYPE: Primary | ICD-10-CM

## 2019-01-04 ENCOUNTER — TELEPHONE (OUTPATIENT)
Dept: GASTROENTEROLOGY | Facility: CLINIC | Age: 74
End: 2019-01-04

## 2019-01-04 NOTE — TELEPHONE ENCOUNTER
----- Message from Ramiro Jefferson MD sent at 12/29/2018 12:10 PM CST -----  Viry - please tell Anayeli that she had a non-specific elevated Chrogranin A and it can be falsely elevated by any of the acid supressing meds from over the counter to prescription acid suppressing meds.    Recommend a repeat FASTING Chromogranin A level to be draw in 4 weeks done off the following acid suppressing meds below.    1. Off all Proton Pump Inhibitors medications for 2 (Two) weeks before blood test:    Nexium (esomeprazole)  Prilosec (omeprazole)   Protonix (pantoprazole)  Prevacid (lansoprazole)  Aciphex (rabeprazole)  Dexilant (dexlansoprazole)    Zegerid (omeprazole and sodium bicarbonate)    2.Off all H2 blockers medications for 2 (Two) weeks before blood test.  Zantac (ranitidine)  Tagamet (cimetadine)  Axid (nizatidine)   Pepcid (famotidine)

## 2019-01-05 LAB — SOMATOSTAT PLAS-MCNC: 33 PG/ML

## 2019-01-07 ENCOUNTER — TELEPHONE (OUTPATIENT)
Dept: ENDOSCOPY | Facility: HOSPITAL | Age: 74
End: 2019-01-07

## 2019-01-07 NOTE — TELEPHONE ENCOUNTER
----- Message from Gifty Bryant sent at 1/7/2019 11:13 AM CST -----  Contact: Self- [phone]  Deshawn- pt states she is returning a missed call to schedule an EUS- please contact pt at [phone]

## 2019-01-07 NOTE — TELEPHONE ENCOUNTER
Spoke with patient. EUS scheduled for 1/23 at 11a. Reviewed prep instructions. Ms Judd verbalized understanding.

## 2019-01-08 ENCOUNTER — TELEPHONE (OUTPATIENT)
Dept: ENDOSCOPY | Facility: HOSPITAL | Age: 74
End: 2019-01-08

## 2019-01-08 RX ORDER — ASPIRIN 81 MG/1
81 TABLET ORAL DAILY
COMMUNITY
End: 2019-02-28

## 2019-01-08 NOTE — TELEPHONE ENCOUNTER
Spoke with patient about instructions for UEUS scheduled 1/23/19 at 1100.  Instructions emailed and mailed.

## 2019-01-09 DIAGNOSIS — R19.7 DIARRHEA, UNSPECIFIED TYPE: Primary | ICD-10-CM

## 2019-01-09 NOTE — Clinical Note
Hi, can we please schedule this somatostatin level to be drawn the same day we have the chromogranin A level (fasting in 4 weeks)Thanks,Dan

## 2019-01-23 ENCOUNTER — ANESTHESIA (OUTPATIENT)
Dept: ENDOSCOPY | Facility: HOSPITAL | Age: 74
End: 2019-01-23
Payer: MEDICARE

## 2019-01-23 ENCOUNTER — HOSPITAL ENCOUNTER (OUTPATIENT)
Facility: HOSPITAL | Age: 74
Discharge: HOME | End: 2019-01-23
Attending: INTERNAL MEDICINE | Admitting: INTERNAL MEDICINE
Payer: MEDICARE

## 2019-01-23 ENCOUNTER — ANESTHESIA EVENT (OUTPATIENT)
Dept: ENDOSCOPY | Facility: HOSPITAL | Age: 74
End: 2019-01-23
Payer: MEDICARE

## 2019-01-23 VITALS
BODY MASS INDEX: 26.31 KG/M2 | DIASTOLIC BLOOD PRESSURE: 63 MMHG | RESPIRATION RATE: 20 BRPM | HEIGHT: 62 IN | TEMPERATURE: 98 F | OXYGEN SATURATION: 100 % | WEIGHT: 143 LBS | HEART RATE: 73 BPM | SYSTOLIC BLOOD PRESSURE: 115 MMHG

## 2019-01-23 DIAGNOSIS — R10.9 ABDOMINAL PAIN: ICD-10-CM

## 2019-01-23 DIAGNOSIS — R10.9 ABDOMINAL PAIN, UNSPECIFIED ABDOMINAL LOCATION: Primary | ICD-10-CM

## 2019-01-23 LAB — POCT GLUCOSE: 143 MG/DL (ref 70–110)

## 2019-01-23 PROCEDURE — 25000003 PHARM REV CODE 250: Performed by: INTERNAL MEDICINE

## 2019-01-23 PROCEDURE — 43259 EGD US EXAM DUODENUM/JEJUNUM: CPT | Performed by: INTERNAL MEDICINE

## 2019-01-23 PROCEDURE — 37000009 HC ANESTHESIA EA ADD 15 MINS: Performed by: INTERNAL MEDICINE

## 2019-01-23 PROCEDURE — 25000003 PHARM REV CODE 250: Performed by: NURSE ANESTHETIST, CERTIFIED REGISTERED

## 2019-01-23 PROCEDURE — D9220A PRA ANESTHESIA: ICD-10-PCS | Mod: ANES,,, | Performed by: ANESTHESIOLOGY

## 2019-01-23 PROCEDURE — 43259 PR ENDOSCOPIC ULTRASOUND EXAM: ICD-10-PCS | Mod: ,,, | Performed by: INTERNAL MEDICINE

## 2019-01-23 PROCEDURE — D9220A PRA ANESTHESIA: Mod: CRNA,,, | Performed by: NURSE ANESTHETIST, CERTIFIED REGISTERED

## 2019-01-23 PROCEDURE — D9220A PRA ANESTHESIA: Mod: ANES,,, | Performed by: ANESTHESIOLOGY

## 2019-01-23 PROCEDURE — D9220A PRA ANESTHESIA: ICD-10-PCS | Mod: CRNA,,, | Performed by: NURSE ANESTHETIST, CERTIFIED REGISTERED

## 2019-01-23 PROCEDURE — 43259 EGD US EXAM DUODENUM/JEJUNUM: CPT | Mod: ,,, | Performed by: INTERNAL MEDICINE

## 2019-01-23 PROCEDURE — 37000008 HC ANESTHESIA 1ST 15 MINUTES: Performed by: INTERNAL MEDICINE

## 2019-01-23 PROCEDURE — 63600175 PHARM REV CODE 636 W HCPCS: Performed by: NURSE ANESTHETIST, CERTIFIED REGISTERED

## 2019-01-23 RX ORDER — SODIUM CHLORIDE 0.9 % (FLUSH) 0.9 %
3 SYRINGE (ML) INJECTION
Status: DISCONTINUED | OUTPATIENT
Start: 2019-01-23 | End: 2019-01-23 | Stop reason: HOSPADM

## 2019-01-23 RX ORDER — FENTANYL CITRATE 50 UG/ML
INJECTION, SOLUTION INTRAMUSCULAR; INTRAVENOUS
Status: DISCONTINUED | OUTPATIENT
Start: 2019-01-23 | End: 2019-01-23

## 2019-01-23 RX ORDER — SODIUM CHLORIDE 9 MG/ML
INJECTION, SOLUTION INTRAVENOUS CONTINUOUS
Status: DISCONTINUED | OUTPATIENT
Start: 2019-01-23 | End: 2019-01-23 | Stop reason: HOSPADM

## 2019-01-23 RX ORDER — GLYCOPYRROLATE 0.2 MG/ML
INJECTION INTRAMUSCULAR; INTRAVENOUS
Status: DISCONTINUED | OUTPATIENT
Start: 2019-01-23 | End: 2019-01-23

## 2019-01-23 RX ORDER — PROPOFOL 10 MG/ML
VIAL (ML) INTRAVENOUS
Status: DISCONTINUED | OUTPATIENT
Start: 2019-01-23 | End: 2019-01-23

## 2019-01-23 RX ORDER — LIDOCAINE HCL/PF 100 MG/5ML
SYRINGE (ML) INTRAVENOUS
Status: DISCONTINUED | OUTPATIENT
Start: 2019-01-23 | End: 2019-01-23

## 2019-01-23 RX ORDER — PROPOFOL 10 MG/ML
VIAL (ML) INTRAVENOUS CONTINUOUS PRN
Status: DISCONTINUED | OUTPATIENT
Start: 2019-01-23 | End: 2019-01-23

## 2019-01-23 RX ADMIN — LIDOCAINE HYDROCHLORIDE 60 MG: 20 INJECTION, SOLUTION INTRAVENOUS at 11:01

## 2019-01-23 RX ADMIN — PROPOFOL 150 MCG/KG/MIN: 10 INJECTION, EMULSION INTRAVENOUS at 11:01

## 2019-01-23 RX ADMIN — FENTANYL CITRATE 25 MCG: 50 INJECTION, SOLUTION INTRAMUSCULAR; INTRAVENOUS at 11:01

## 2019-01-23 RX ADMIN — GLYCOPYRROLATE 0.2 MG: 0.2 INJECTION, SOLUTION INTRAMUSCULAR; INTRAVENOUS at 11:01

## 2019-01-23 RX ADMIN — PROPOFOL 70 MG: 10 INJECTION, EMULSION INTRAVENOUS at 11:01

## 2019-01-23 RX ADMIN — SODIUM CHLORIDE: 0.9 INJECTION, SOLUTION INTRAVENOUS at 10:01

## 2019-01-23 NOTE — TRANSFER OF CARE
"Anesthesia Transfer of Care Note    Patient: Anayeli Judd    Procedure(s) Performed: Procedure(s) (LRB):  ULTRASOUND, UPPER GI TRACT, ENDOSCOPIC (N/A)    Patient location: [facility]    Anesthesia Type: general    Transport from OR: Transported from OR on room air with adequate spontaneous ventilation    Post pain: adequate analgesia    Post assessment: no apparent anesthetic complications and tolerated procedure well    Post vital signs: stable    Level of consciousness: awake    Complications: none    Transfer of care protocol was followed      Last vitals:   Visit Vitals  /62   Pulse 80   Temp 36.2 °C (97.1 °F) (Temporal)   Resp 18   Ht 5' 2" (1.575 m)   Wt 64.9 kg (143 lb)   LMP  (LMP Unknown)   SpO2 98%   Breastfeeding? No   BMI 26.16 kg/m²     "

## 2019-01-23 NOTE — H&P
"History & Physical - Short Stay  Gastroenterology      SUBJECTIVE:     Procedure: EUS    Chief Complaint/Indication for Procedure: Abdominal Pain    History of Present Illness:  Patient is a 74 y.o. female presents with chronic abdominal pain here for endosonographic evaluation. Last EUS unsuccessful due to inability to pass the echoendoscope through the UES.     Facility-Administered Medications Prior to Admission   Medication    amoxicillin capsule 500 mg     PTA Medications   Medication Sig    aspirin (ECOTRIN) 81 MG EC tablet Take 81 mg by mouth once daily.    atorvastatin (LIPITOR) 40 MG tablet Take 1 tablet (40 mg total) by mouth once daily.    azelastine (ASTELIN) 137 mcg nasal spray 1 spray (137 mcg total) by Nasal route 2 (two) times daily.    betamethasone dipropionate (DIPROLENE) 0.05 % ointment Apply every Am x 2 weeks then twice weekly    blood sugar diagnostic Strp Dispense [employer]â„¢ contour strips; test blood sugar once daily    blood-glucose meter (CONTOUR METER) kit Please dispense [employer] Contour meter and supplies Use as instructed Test Blood sugar once daily    conjugated estrogens (PREMARIN) vaginal cream Place 0.5 g vaginally every evening.    diphenhydrAMINE (BENADRYL) 25 mg capsule Take 1 each (25 mg total) by mouth nightly as needed for Itching, Allergies or Insomnia.    fluticasone (FLONASE) 50 mcg/actuation nasal spray 2 sprays by Each Nare route once daily.    FREESTYLE EFREN READER [facility] [place] as directed    FREESTYLE EFREN SENSOR Kit     gabapentin (NEURONTIN) 600 MG tablet Take 1,200 mg by mouth 2 (two) times daily.     hydrocortisone 2.5 % cream     insulin glargine (LANTUS SOLOSTAR U-100 INSULIN) 100 unit/mL (3 mL) InPn pen Inject 10 Units into the skin every evening. (Patient taking differently: Inject 12 Units into the skin every evening. )    insulin syringe-needle U-100 0.3 mL 31 gauge x 15/64" Syrg 1 Syringe by Misc.(Non-Drug; Combo Route) route once daily.    " ipratropium (ATROVENT) 0.06 % nasal spray     lancets (ONE TOUCH ULTRASOFT LANCETS) Misc Test blood sugar once daily    levothyroxine (SYNTHROID) 100 MCG tablet TAKE 1 TABLET BY MOUTH EVERY MORNING    linagliptin (TRADJENTA) 5 mg Tab tablet Take 1 tablet (5 mg total) by mouth once daily. (Patient taking differently: Take 5 mg by mouth 2 (two) times daily. )    magnesium 30 mg Tab Take 500 capsules by mouth. Patient's taking magnesium 500mg QD    meloxicam (MOBIC) 15 MG tablet TAKE 1 TABLET BY MOUTH DAILY WITH FOOD AS NEEDED FOR PAIN OR ARTHRITIS    metFORMIN (GLUCOPHAGE-XR) 750 MG 24 hr tablet Take 1 tablet (750 mg total) by mouth 2 (two) times daily with meals.    metronidazole 0.75% (METROCREAM) 0.75 % Crea     mirabegron 50 mg Tb24 Take 1 tablet (50 mg total) by mouth once daily.    ondansetron (ZOFRAN) 4 MG tablet Take 1 tablet (4 mg total) by mouth every 8 (eight) hours as needed for Nausea.    ospemifene (OSPHENA) 60 mg Tab Take 60 mg by mouth once daily.    pantoprazole (PROTONIX) 40 MG tablet TAKE 1 TABLET BY MOUTH ONCE DAILY    polyethylene glycol (GLYCOLAX) 17 gram/dose powder Take 17 g by mouth once daily.    sucralfate (CARAFATE) 1 gram tablet TAKE 1 TABLET(1 GRAM) BY MOUTH FOUR TIMES DAILY    triamcinolone acetonide 0.1% (KENALOG) 0.1 % paste apply to affected area with A Q-TIP three times a day    VIT A/VIT C/VIT E/ZINC/COPPER (ICAPS AREDS ORAL) Take by mouth 2 (two) times daily.    vitamin D 1000 units Tab Take 185 mg by mouth once daily. D3       Review of patient's allergies indicates:   Allergen Reactions    Codeine Nausea And Vomiting     Other reaction(s): Itching    Percocet  [oxycodone-acetaminophen]      Other reaction(s): Itching    Sulfa (sulfonamide antibiotics)      Other reaction(s): Nausea  Other reaction(s): Itching        Past Medical History:   Diagnosis Date    Abdominal pain     Allergic rhinitis     Arthritis     Blood transfusion     during delivery and      Bowel obstruction     Cervical radiculopathy     followed by dr reynolds    Colon cancer     transverse colon; resected; Stage IIA (pT3 pN0 MX)    Diarrhea     Family history of breast cancer     Family history of colon cancer     Fatty liver     GERD (gastroesophageal reflux disease)     History of shingles     Hyperlipidemia     Hypothyroidism     Irritable bowel syndrome     Microscopic colitis     treated     Raynaud phenomenon     Raynaud's disease     Type 2 diabetes mellitus      Past Surgical History:   Procedure Laterality Date    APPENDECTOMY      BACK SURGERY      CARPAL TUNNEL RELEASE      bilateral      SECTION      CHOLECYSTECTOMY  1965    COLECTOMY  2011    Transverse colon resection by Dr. Aguirre    COLONOSCOPY N/A 2017    [facility] (4[facility])    COLONOSCOPY N/A 2013    Performed by Angelo Reynolds [facility] at [facility] END[facility] (4TH FLR)    EGD (ESOPHAGOGASTRODUODENOSCOPY) N/A 2018    Performed by [facility] at [facility] ENDO (2ND FLR)    EXPLORATORY-LAPAROTOMY N/A 2017    Performed by Herman Fletcher [facility] at [facility] OR 2ND FLR    EXPLORATORY-LAPAROTOMY N/A 2014    Performed by Gonzalez Silvestre [facility] at [facility] OR 2ND FLR    EYE SURGERY      Cataract Removal    HYSTERECTOMY      LYSIS-ADHESION N/A 2017    Performed by Herman Fletcher [facility] at [facility] OR 2ND FLR    LYSIS-ADHESION N/A 2014    Performed by Gonzalez [facility] at [facility] OR 2ND FLR    PLACEMENT  2017    Performed by Herman Fletcher [facility] at [facility] OR 2ND FLR    posterolateral fusion with autograft bone and Fisher mineralized bone matrix  13    at [facility] for lumbar [facility] stenosis    REPAIR  2017    Performed by Herman Fletcher [facility] at [facility] OR 2ND FLR    RESECTION - SMALL BOWEL N/A 2014    Performed by [facility] at [facility] OR 2ND FLR    TOE SURGERY      TONSILLECTOMY      TRIGGER FINGER RELEASE       ULTRASOUND, UPPER GI TRACT, ENDOSCOPIC N/A 12/12/2018    [facility]     Family History   Problem Relation Age of Onset    Heart disease Father 50        Mi age 50    Colon cancer Father     Bladder Cancer Mother         non smoker    Cataracts Mother     Glaucoma Mother     Heart disease Mother     Hyperlipidemia Mother     Kidney disease Mother     Breast cancer Sister 79    Arthritis Daughter     Asthma Daughter     Depression Daughter     Hypertension Daughter     Stroke Daughter 40    Arthritis Brother     Colon cancer Brother 70    Alcohol abuse Brother     Parkinsonism Brother     Alcohol abuse Brother     Depression Daughter     Celiac disease Neg Hx     Cirrhosis Neg Hx     Colon polyps Neg Hx     Crohn's disease Neg Hx     Cystic fibrosis Neg Hx     Esophageal cancer Neg Hx     Hemochromatosis Neg Hx     Inflammatory bowel disease Neg Hx     Irritable bowel syndrome Neg Hx     Liver cancer Neg Hx     Liver disease Neg Hx     Rectal cancer Neg Hx     Stomach cancer Neg Hx     Ulcerative colitis Neg Hx     Eliazar's disease Neg Hx     Amblyopia Neg Hx     Blindness Neg Hx     Macular degeneration Neg Hx     Retinal detachment Neg Hx     Strabismus Neg Hx     Melanoma Neg Hx      Social History     Tobacco Use    Smoking status: Never Smoker    Smokeless tobacco: Never Used   Substance Use Topics    Alcohol use: Yes     Alcohol/week: 3.0 oz     Types: 5 Glasses of wine per week     Comment: socially    Drug use: No       Review of Systems:  Gastrointestinal: positive for abdominal pain and constipation    OBJECTIVE:     Vital Signs (Most Recent)       Physical Exam:  General: well developed, well nourished  Lungs:  clear to auscultation bilaterally and normal respiratory effort  Heart: regular rate, S1, S2 normal  Abdomen: soft, non-tender non-distented; bowel sounds normal; no masses,  no organomegaly    Laboratory  CBC: No  results for input(s): WBC, RBC, HGB, HCT, PLT, MCV, MCH, MCHC in the last 168 hours.  CMP: No results for input(s): GLU, CALCIUM, ALBUMIN, PROT, NA, K, CO2, CL, BUN, CREATININE, ALKPHOS, ALT, AST, BILITOT in the last 168 hours.  Coagulation: No results for input(s): LABPROT, INR, APTT in the last 168 hours.      Diagnostic Results:      ASSESSMENT/PLAN:     Abdominal pain    Plan: EUS    Anesthesia Plan: MAC    ASA Grade: ASA 3 - Patient with moderate systemic disease with functional limitations     The impression and plan was discussed in detail with the patient. All questions have been answered and the patient voices understanding of our plan at this point. The risk of the procedure was discussed in detail which includes but not limited to bleeding, infection, perforation in some cases requiring surgery with its spectrum of complications.

## 2019-01-23 NOTE — DISCHARGE SUMMARY
Discharge Summary/Instructions after an Endoscopic Procedure    Patient Name: Anayeli Judd  Patient MRN: [id number]  Patient YOB: 1945 Wednesday, January 23, 2019  Jose Hess MD    RESTRICTIONS:  During your procedure today, you received medications for sedation.  These medications may affect your judgment, balance and coordination.  Therefore, for 24 hours, you have the following restrictions:     - DO NOT drive a car, operate machinery, make legal/financial decisions, sign important papers or drink alcohol.      ACTIVITY:  Today: no heavy lifting, straining or running due to procedural sedation/anesthesia.  The following day: return to full activity including work.    DIET:  Eat and drink normally unless instructed otherwise.     TREATMENT FOR COMMON SIDE EFFECTS:  - Mild abdominal pain, nausea, belching, bloating or excessive gas:  rest, eat lightly and use a heating pad.  - Sore Throat: treat with throat lozenges and/or gargle with warm salt water.  - Because air was used during the procedure, expelling large amounts of air from your rectum or belching is normal.  - If a bowel prep was taken, you may not have a bowel movement for 1-3 days.  This is normal.      SYMPTOMS TO WATCH FOR AND REPORT TO YOUR PHYSICIAN:  1. Abdominal pain or bloating, other than gas cramps.  2. Chest pain.  3. Back pain.  4. Signs of infection such as: chills or fever occurring within 24 hours after the procedure.  5. Rectal bleeding, which would show as bright red, maroon, or black stools. (A tablespoon of blood from the rectum is not serious, especially if hemorrhoids are present.)  6. Vomiting.  7. Weakness or dizziness.      GO DIRECTLY TO THE NEAREST EMERGENCY ROOM IF YOU HAVE ANY OF THE FOLLOWING:     Difficulty breathing              Chills and/or fever over 101 F   Persistent vomiting and/or vomiting blood   Severe abdominal pain   Severe chest pain   Black, tarry stools   Bleeding- more than one tablespoon   Any  other symptom or condition that you feel may need urgent attention    Your doctor recommends these additional instructions:  If any biopsies were taken, your doctors clinic will contact you in 1 to 2 weeks with any results.    - Discharge patient to home (ambulatory).   - Observe patient's clinical course.   - Return to referring physician.   - Perform an upper GI endoscopy in 12 weeks to check healing of the gastric ulcer by Primary GI team.  - Use Protonix (pantoprazole) 40 mg PO daily.    For questions, problems or results please call your physician - Jose Hess MD at Work:  [phone].    [facility], EMERGENCY ROOM PHONE NUMBER: [phone]    IF A COMPLICATION OR EMERGENCY SITUATION ARISES AND YOU ARE UNABLE TO REACH YOUR PHYSICIAN - GO DIRECTLY TO THE EMERGENCY ROOM.

## 2019-01-23 NOTE — DISCHARGE INSTRUCTIONS
Endoscopic Ultrasound (EUS)    An endoscopic ultrasound (EUS) is a test to look at the inside of your gastrointestinal (GI) tract. It's commonly used to look for cancers or growths in the esophagus, stomach, pancreas, liver, and rectum. It can help to stage cancer (see how advanced a cancer is). EUS may also be used to help diagnose certain diseases or to drain cysts or abscesses.  What is EUS?  EUS shows both ultrasound images and live video of the GI tract. During the test, a flexible tube called an endoscope (scope) is used. At the end of the scope is a tiny video camera and light. The video camera sends live images to a monitor. The scope also contains a very small ultrasound device. This uses sound waves to create images and send them to a monitor.  A needle is passed through the scope. The needle can be used take a small sample of tissue for testing. This is called a biopsy. The needle can be used to take a sample of fluid. This is called fine-needle aspiration (FNA).  Risks and possible complications of EUS  Risks and possible complications include the following:  · Bleeding  · Infection  · A perforation (hole) in the digestive tract   · Risks of sedation or anesthesia   Before the test  Be prepared prior to the test:  · Tell your healthcare provider what medicine you take. This includes vitamins, herbs, and over-the-counter medicine. It also includes any blood thinners, such as warfarin, clopidogrel, ibuprofen, or daily aspirin. Ask your healthcare provider if you need to stop taking some or all of them before the test.  · You may be prescribed antibiotics to take before or after the test. This depends on the area being studied and what is done during the test. These medicines help prevent infection.  · Carefully follow the instructions for preparing for the test to make sure results are accurate. Instructions may include:  ¨ If youre having an EUS of the upper GI tract (esophagus, stomach, duodenum,  pancreas, liver):  § Do not eat or drink for 6 hours before the test.  ¨ If youre having an EUS of the lower GI tract (rectum):  § Before the test, do bowel prep as instructed to clean your rectum of stool. This may involve a clear liquid diet and using a laxative (liquid or pills) the night before the test. Or it may mean doing one or more enemas the morning of the test.  § Do not eat or drink for 6 hours before the test.  · Be sure to arrive on time at the facility. Bring your identification and health insurance card. Leave valuables at home. If you have them, bring X-rays or other test results with you.  Let the healthcare provider know  For your safety, tell the healthcare provider if you:  · Take insulin. Your dose may need to be changed on the day of your test.  · Are allergic to latex.  · Have any other allergies.  · Are taking blood thinners.   During the test  An endoscopic ultrasound usually takes place in a hospital. The procedure itself may take 1 to 2 hours. You will likely go home soon afterward. During the test:  · You lie on your left side on an exam table.  · An intravenous (IV) line will be put into a vein in your arm or hand. This line supplies fluids and medicines. To keep you comfortable during the test, you will be given a sedative medicine. This medicine prevents discomfort and will make you sleepy.  · If you are having an EUS of the upper GI tract, local anesthetic may be sprayed in your throat. This will help you be more comfortable as the healthcare provider inserts the scope. The healthcare provider then gently puts the flexible scope into your mouth or nose and down your throat.  · If youre having an EUS of the lower GI tract, the healthcare provider gently puts the flexible scope into your anus.  · During the test, the scope sends live video and ultrasound images from inside your body to nearby monitors. These are used to examine your GI tract. Specialized procedures, such as drainage,  are done as needed.  · The healthcare provider may discuss the results with you soon after the test. Biopsy results take several  days.  · In most cases, you can go home within a few hours of the test. When you leave the facility, have an adult family member or friend drive you, even if you don't feel that sleepy.  After the test  Here is what to expect after the test:  · You may feel tired from the sedative. This should wear off by the end of the day.  · If you had an upper digestive endoscopy, your throat may feel sore for a day or two. Over-the-counter sore throat lozenges and spray should help.  · You can eat and drink normally as soon as the test is done.  When to call the healthcare provider  Call your healthcare provider if you notice any of the following:  · Fever of 100.4°F (38.0°C) or higher, or as advised by your healthcare provider  · Shortness of breath  · Vomiting blood, blood in stool, or black stools  · Coughing or hoarse voice that wont go away   Date Last Reviewed: 7/1/2016 © 2000-2017 [employer]. [address] [place] [address]. All rights reserved. This information is not intended as a substitute for professional medical care. Always follow your healthcare professional's instructions.      Anesthesia: General Anesthesia     You are watched continuously during your procedure by your anesthesia provider.     Youre due to have surgery. During surgery, youll be given medicine called anesthesia or anesthetic. This will keep you comfortable and pain-free. Your anesthesia provider will use general anesthesia.  What is general anesthesia?  General anesthesia puts you into a state like deep sleep. It goes into the bloodstream (IV anesthetics), into the lungs (gas anesthetics), or both. You feel nothing during the procedure. You will not remember it. During the procedure, the anesthesia provider monitors you continuously. He or she checks your heart rate and rhythm, blood  pressure, breathing, and blood oxygen.  · IV anesthetics. IV anesthetics are given through an IV line in your arm. Theyre often given first. This is so you are asleep before a gas anesthetic is started. Some kinds of IV anesthetics relieve pain. Others relax you. Your doctor will decide which kind is best in your case.  · Gas anesthetics. Gas anesthetics are breathed into the lungs. They are often used to keep you asleep. They can be given through a facemask or a tube placed in your larynx or trachea (breathing tube).  ¨ If you have a facemask, your anesthesia provider will most likely place it over your nose and mouth while youre still awake. Youll breathe oxygen through the mask as your IV anesthetic is started. Gas anesthetic may be added through the mask.  ¨ If you have a tube in the larynx or trachea, it will be inserted into your throat after youre asleep.  Anesthesia tools and medicines  You will likely have:  · IV anesthetics. These are put into an IV line into your bloodstream.  · Gas anesthetics. You breathe these anesthetics into your lungs, where they pass into your bloodstream.  · Pulse oximeter. This is a small clip that is attached to the end of your finger. This measures your blood oxygen level.  · Electrocardiography leads (electrodes). These are small sticky pads that are placed on your chest. They record your heart rate and rhythm.  · Blood pressure cuff. This reads your blood pressure.  Risks and possible complications  General anesthesia has some risks. These include:  · Breathing problems  · Nausea and vomiting  · Sore throat or hoarseness (usually temporary)  · Allergic reaction to the anesthetic  · Irregular heartbeat (rare)  · Cardiac arrest (rare)   Anesthesia safety  · Follow all instructions you are given for how long not to eat or drink before your procedure.  · Be sure your doctor knows what medicines and drugs you take. This includes over-the-counter medicines, herbs, supplements,  alcohol or other drugs. You will be asked when those were last taken.  · Have an adult family member or friend drive you home after the procedure.  · For the first 24 hours after your surgery:  ¨ Do not drive or use heavy equipment.  ¨ Do not make important decisions or sign legal documents. If important decisions or signing legal documents is necessary during the first 24 hours after surgery, have a trusted family member or spouse act on your behalf.  ¨ Avoid alcohol.  ¨ Have a responsible adult stay with you. He or she can watch for problems and help keep you safe.  Date Last Reviewed: 12/1/2016  © 1025-0702 [employer]. [address] [place] [address], [place] [address]. All rights reserved. This information is not intended as a substitute for professional medical care. Always follow your healthcare professional's instructions.

## 2019-01-23 NOTE — ANESTHESIA POSTPROCEDURE EVALUATION
"Anesthesia Post Evaluation    Patient: Anayeli Judd    Procedure(s) Performed: Procedure(s) (LRB):  ULTRASOUND, UPPER GI TRACT, ENDOSCOPIC (N/A)    Final Anesthesia Type: general  Patient location during evaluation: PACU  Patient participation: Yes- Able to Participate  Level of consciousness: awake and alert, awake and oriented  Post-procedure vital signs: reviewed and stable  Pain management: adequate  Airway patency: patent  PONV status at discharge: No PONV  Anesthetic complications: no      Cardiovascular status: blood pressure returned to baseline, stable and hemodynamically stable  Respiratory status: unassisted, spontaneous ventilation and room air  Hydration status: euvolemic  Follow-up not needed.        Visit Vitals  /63 (BP Location: Left arm, Patient Position: Lying)   Pulse 73   Temp 36.4 °C (97.5 °F) (Temporal)   Resp 20   Ht 5' 2" (1.575 m)   Wt 64.9 kg (143 lb)   LMP  (LMP Unknown)   SpO2 100%   Breastfeeding? No   BMI 26.16 kg/m²       Pain/Roscoe Score: Roscoe Score: 10 (1/23/2019 11:57 AM)        "

## 2019-01-23 NOTE — ANESTHESIA PREPROCEDURE EVALUATION
01/23/2019  Anayeli Judd is a 74 y.o., female.    Anesthesia Evaluation    I have reviewed the Patient Summary Reports.    I have reviewed the Nursing Notes.   I have reviewed the Medications.     Review of Systems  Anesthesia Hx:  No problems with previous Anesthesia  History of prior surgery of interest to airway management or planning: Denies Family Hx of Anesthesia complications.    Social:  No Alcohol Use    Cardiovascular:   Exercise tolerance: good  Denies Angina.    Pulmonary:  Pulmonary Normal    Renal/:   Chronic Renal Disease    Hepatic/GI:   GERD, well controlled Liver Disease,    Musculoskeletal:   Arthritis     Neurological:   Neuromuscular Disease, Headaches    Endocrine:   Diabetes, well controlled Hypothyroidism        Physical Exam  General:  Well nourished    Airway/Jaw/Neck:  Airway Findings: Mouth Opening: Normal Tongue: Normal  General Airway Assessment: Adult  Mallampati: II  TM Distance: 4 - 6 cm      Dental:  Dental Findings:    Chest/Lungs:  Chest/Lungs Findings: Clear to auscultation, Normal Respiratory Rate     Heart/Vascular:  Heart Findings: Rate: Normal  Rhythm: Regular Rhythm        Mental Status:  Mental Status Findings:  Cooperative, Alert and Oriented         Anesthesia Plan  Type of Anesthesia, risks & benefits discussed:  Anesthesia Type:  general  Patient's Preference:   Intra-op Monitoring Plan: standard ASA monitors  Intra-op Monitoring Plan Comments:   Post Op Pain Control Plan: multimodal analgesia  Post Op Pain Control Plan Comments:   Induction:   IV  Beta Blocker:  Patient is not currently on a Beta-Blocker (No further documentation required).       Informed Consent: Patient understands risks and agrees with Anesthesia plan.  Questions answered. Anesthesia consent signed with patient.  ASA Score: 2     Day of Surgery Review of History & Physical:    H&P  update referred to the surgeon.         Ready For Surgery From Anesthesia Perspective.

## 2019-01-23 NOTE — PROGRESS NOTES
Dr Hess called and asked when pt was to resume home aspirin.  Dr Hess instructed pt resume aspirin tomorrow.  Instructed pt and caregiver and both verbalized understanding

## 2019-01-23 NOTE — [employer] PATIENT INSTRUCTIONS
Discharge Summary/Instructions after an Endoscopic Procedure  Patient Name: Anayeli Judd  Patient MRN: [id number]  Patient YOB: 1945 Wednesday, January 23, 2019  Jose Hess MD  RESTRICTIONS:  During your procedure today, you received medications for sedation.  These   medications may affect your judgment, balance and coordination.  Therefore,   for 24 hours, you have the following restrictions:   - DO NOT drive a car, operate machinery, make legal/financial decisions,   sign important papers or drink alcohol.    ACTIVITY:  Today: no heavy lifting, straining or running due to procedural   sedation/anesthesia.  The following day: return to full activity including work.  DIET:  Eat and drink normally unless instructed otherwise.     TREATMENT FOR COMMON SIDE EFFECTS:  - Mild abdominal pain, nausea, belching, bloating or excessive gas:  rest,   eat lightly and use a heating pad.  - Sore Throat: treat with throat lozenges and/or gargle with warm salt   water.  - Because air was used during the procedure, expelling large amounts of air   from your rectum or belching is normal.  - If a bowel prep was taken, you may not have a bowel movement for 1-3 days.    This is normal.  SYMPTOMS TO WATCH FOR AND REPORT TO YOUR PHYSICIAN:  1. Abdominal pain or bloating, other than gas cramps.  2. Chest pain.  3. Back pain.  4. Signs of infection such as: chills or fever occurring within 24 hours   after the procedure.  5. Rectal bleeding, which would show as bright red, maroon, or black stools.   (A tablespoon of blood from the rectum is not serious, especially if   hemorrhoids are present.)  6. Vomiting.  7. Weakness or dizziness.  GO DIRECTLY TO THE NEAREST EMERGENCY ROOM IF YOU HAVE ANY OF THE FOLLOWING:      Difficulty breathing              Chills and/or fever over 101 F   Persistent vomiting and/or vomiting blood   Severe abdominal pain   Severe chest pain   Black, tarry stools   Bleeding- more than one  tablespoon   Any other symptom or condition that you feel may need urgent attention  Your doctor recommends these additional instructions:  If any biopsies were taken, your doctors clinic will contact you in 1 to 2   weeks with any results.  - Discharge patient to home (ambulatory).   - Observe patient's clinical course.   - Return to referring physician.   - Perform an upper GI endoscopy in 12 weeks to check healing of the gastric   ulcer by Primary GI team.  - Use Protonix (pantoprazole) 40 mg PO daily.  For questions, problems or results please call your physician - Jose Hess MD at Work:  [phone].  [facility], EMERGENCY ROOM PHONE NUMBER: [phone]  IF A COMPLICATION OR EMERGENCY SITUATION ARISES AND YOU ARE UNABLE TO REACH   YOUR PHYSICIAN - GO DIRECTLY TO THE EMERGENCY ROOM.  Jose Hess MD  1/23/2019 11:36:25 AM  This report has been verified and signed electronically.  [employer]

## 2019-01-23 NOTE — PROGRESS NOTES
Dr Hess and caretaker at bedside.  Dr Hess talking to pt and caretaker about procedure and findings

## 2019-01-24 ENCOUNTER — TELEPHONE (OUTPATIENT)
Dept: UROGYNECOLOGY | Facility: CLINIC | Age: 74
End: 2019-01-24

## 2019-01-24 NOTE — TELEPHONE ENCOUNTER
----- Message from Ashley Bryan sent at 1/24/2019  9:39 AM CST -----  Contact: Patient   Patient called to schedule annual exam appointment. The patient can be reached at [phone].

## 2019-01-24 NOTE — TELEPHONE ENCOUNTER
Spoke with pt and reschedule her appointment at her request pt giving new date and time, she voiced understanding and call was ended.

## 2019-01-30 ENCOUNTER — TELEPHONE (OUTPATIENT)
Dept: GASTROENTEROLOGY | Facility: CLINIC | Age: 74
End: 2019-01-30

## 2019-01-30 DIAGNOSIS — T18.9XXD FOREIGN BODY ALIMENTARY TRACT, SUBSEQUENT ENCOUNTER: Primary | ICD-10-CM

## 2019-01-30 DIAGNOSIS — R10.84 GENERALIZED ABDOMINAL PAIN: ICD-10-CM

## 2019-01-30 NOTE — TELEPHONE ENCOUNTER
Discussed plan with patient regarding foreign body at SI anastomosis site.  - repeat CT scan to re-evaluate  - if remains present will likely proceed with retrograde DBE   n/a

## 2019-01-31 NOTE — TELEPHONE ENCOUNTER
Spoke with pt yesterday   She was going to have GI answere some of her questions about procedure.

## 2019-02-01 ENCOUNTER — LAB VISIT (OUTPATIENT)
Dept: LAB | Facility: HOSPITAL | Age: 74
End: 2019-02-01
Attending: INTERNAL MEDICINE
Payer: MEDICARE

## 2019-02-01 DIAGNOSIS — E03.4 HYPOTHYROIDISM DUE TO ACQUIRED ATROPHY OF THYROID: ICD-10-CM

## 2019-02-01 DIAGNOSIS — R19.7 DIARRHEA, UNSPECIFIED TYPE: ICD-10-CM

## 2019-02-01 DIAGNOSIS — N39.0 URINARY TRACT INFECTION WITHOUT HEMATURIA, SITE UNSPECIFIED: ICD-10-CM

## 2019-02-01 DIAGNOSIS — Z79.899 MEDICATION MANAGEMENT: ICD-10-CM

## 2019-02-01 LAB
ALBUMIN SERPL BCP-MCNC: 3.9 G/DL
ALP SERPL-CCNC: 54 U/L
ALT SERPL W/O P-5'-P-CCNC: 30 U/L
ANION GAP SERPL CALC-SCNC: 8 MMOL/L
AST SERPL-CCNC: 33 U/L
BASOPHILS # BLD AUTO: 0.04 K/UL
BASOPHILS NFR BLD: 0.6 %
BILIRUB SERPL-MCNC: 0.8 MG/DL
BUN SERPL-MCNC: 11 MG/DL
CALCIUM SERPL-MCNC: 10 MG/DL
CHLORIDE SERPL-SCNC: 105 MMOL/L
CO2 SERPL-SCNC: 25 MMOL/L
CREAT SERPL-MCNC: 1 MG/DL
DIFFERENTIAL METHOD: ABNORMAL
EOSINOPHIL # BLD AUTO: 0.2 K/UL
EOSINOPHIL NFR BLD: 3.1 %
ERYTHROCYTE [DISTWIDTH] IN BLOOD BY AUTOMATED COUNT: 17.5 %
EST. GFR  (AFRICAN AMERICAN): >60 ML/MIN/1.73 M^2
EST. GFR  (NON AFRICAN AMERICAN): 54.9 ML/MIN/1.73 M^2
GLUCOSE SERPL-MCNC: 138 MG/DL
HCT VFR BLD AUTO: 37.2 %
HGB BLD-MCNC: 11.3 G/DL
IGA SERPL-MCNC: 188 MG/DL
IGG SERPL-MCNC: 1033 MG/DL
IGM SERPL-MCNC: 95 MG/DL
IMM GRANULOCYTES # BLD AUTO: 0.01 K/UL
IMM GRANULOCYTES NFR BLD AUTO: 0.1 %
LYMPHOCYTES # BLD AUTO: 1.3 K/UL
LYMPHOCYTES NFR BLD: 18.6 %
MAGNESIUM SERPL-MCNC: 1.8 MG/DL
MCH RBC QN AUTO: 25.3 PG
MCHC RBC AUTO-ENTMCNC: 30.4 G/DL
MCV RBC AUTO: 83 FL
MONOCYTES # BLD AUTO: 0.7 K/UL
MONOCYTES NFR BLD: 9.8 %
NEUTROPHILS # BLD AUTO: 4.8 K/UL
NEUTROPHILS NFR BLD: 67.8 %
NRBC BLD-RTO: 0 /100 WBC
PLATELET # BLD AUTO: 104 K/UL
PMV BLD AUTO: 12.2 FL
POTASSIUM SERPL-SCNC: 4.5 MMOL/L
PROT SERPL-MCNC: 7.6 G/DL
RBC # BLD AUTO: 4.46 M/UL
SODIUM SERPL-SCNC: 138 MMOL/L
T4 FREE SERPL-MCNC: 1.6 NG/DL
TSH SERPL DL<=0.005 MIU/L-ACNC: 0.09 UIU/ML
VIT B12 SERPL-MCNC: 615 PG/ML
WBC # BLD AUTO: 7.11 K/UL

## 2019-02-01 PROCEDURE — 84443 ASSAY THYROID STIM HORMONE: CPT

## 2019-02-01 PROCEDURE — 85025 COMPLETE CBC W/AUTO DIFF WBC: CPT

## 2019-02-01 PROCEDURE — 84307 ASSAY OF SOMATOSTATIN: CPT

## 2019-02-01 PROCEDURE — 36415 COLL VENOUS BLD VENIPUNCTURE: CPT | Mod: PO

## 2019-02-01 PROCEDURE — 82607 VITAMIN B-12: CPT

## 2019-02-01 PROCEDURE — 83516 IMMUNOASSAY NONANTIBODY: CPT

## 2019-02-01 PROCEDURE — 83735 ASSAY OF MAGNESIUM: CPT

## 2019-02-01 PROCEDURE — 80053 COMPREHEN METABOLIC PANEL: CPT

## 2019-02-01 PROCEDURE — 82784 ASSAY IGA/IGD/IGG/IGM EACH: CPT | Mod: 59

## 2019-02-01 PROCEDURE — 86316 IMMUNOASSAY TUMOR OTHER: CPT

## 2019-02-01 PROCEDURE — 84439 ASSAY OF FREE THYROXINE: CPT

## 2019-02-03 ENCOUNTER — HOSPITAL ENCOUNTER (EMERGENCY)
Facility: HOSPITAL | Age: 74
Discharge: HOME | End: 2019-02-04
Attending: EMERGENCY MEDICINE
Payer: MEDICARE

## 2019-02-03 DIAGNOSIS — N39.0 URINARY TRACT INFECTION WITHOUT HEMATURIA, SITE UNSPECIFIED: ICD-10-CM

## 2019-02-03 DIAGNOSIS — R10.9 ABDOMINAL PAIN: Primary | ICD-10-CM

## 2019-02-03 PROCEDURE — 99284 PR EMERGENCY DEPT VISIT,LEVEL IV: ICD-10-PCS | Mod: ,,, | Performed by: PHYSICIAN ASSISTANT

## 2019-02-03 PROCEDURE — 96374 THER/PROPH/DIAG INJ IV PUSH: CPT

## 2019-02-03 PROCEDURE — 96375 TX/PRO/DX INJ NEW DRUG ADDON: CPT

## 2019-02-03 PROCEDURE — 99284 EMERGENCY DEPT VISIT MOD MDM: CPT | Mod: ,,, | Performed by: PHYSICIAN ASSISTANT

## 2019-02-03 PROCEDURE — 96361 HYDRATE IV INFUSION ADD-ON: CPT

## 2019-02-03 PROCEDURE — 99285 EMERGENCY DEPT VISIT HI MDM: CPT | Mod: 25

## 2019-02-04 ENCOUNTER — TELEPHONE (OUTPATIENT)
Dept: ENDOSCOPY | Facility: HOSPITAL | Age: 74
End: 2019-02-04

## 2019-02-04 VITALS
RESPIRATION RATE: 16 BRPM | BODY MASS INDEX: 26.31 KG/M2 | DIASTOLIC BLOOD PRESSURE: 53 MMHG | SYSTOLIC BLOOD PRESSURE: 109 MMHG | HEIGHT: 62 IN | WEIGHT: 143 LBS | OXYGEN SATURATION: 100 % | HEART RATE: 63 BPM | TEMPERATURE: 98 F

## 2019-02-04 LAB
ALBUMIN SERPL BCP-MCNC: 4 G/DL
ALP SERPL-CCNC: 60 U/L
ALT SERPL W/O P-5'-P-CCNC: 28 U/L
ANION GAP SERPL CALC-SCNC: 12 MMOL/L
AST SERPL-CCNC: 28 U/L
BASOPHILS # BLD AUTO: 0.04 K/UL
BASOPHILS NFR BLD: 0.5 %
BILIRUB SERPL-MCNC: 0.9 MG/DL
BILIRUB UR QL STRIP: NEGATIVE
BUN SERPL-MCNC: 12 MG/DL
CALCIUM SERPL-MCNC: 10 MG/DL
CGA SERPL-MCNC: 120 NG/ML (ref 0–95)
CHLORIDE SERPL-SCNC: 100 MMOL/L
CLARITY UR REFRACT.AUTO: ABNORMAL
CO2 SERPL-SCNC: 21 MMOL/L
COLOR UR AUTO: YELLOW
CREAT SERPL-MCNC: 0.9 MG/DL
DIFFERENTIAL METHOD: ABNORMAL
EOSINOPHIL # BLD AUTO: 0.3 K/UL
EOSINOPHIL NFR BLD: 3.7 %
ERYTHROCYTE [DISTWIDTH] IN BLOOD BY AUTOMATED COUNT: 17.4 %
EST. GFR  (AFRICAN AMERICAN): >60 ML/MIN/1.73 M^2
EST. GFR  (NON AFRICAN AMERICAN): >60 ML/MIN/1.73 M^2
GLUCOSE SERPL-MCNC: 112 MG/DL
GLUCOSE UR QL STRIP: NEGATIVE
HCT VFR BLD AUTO: 35.1 %
HGB BLD-MCNC: 10.8 G/DL
HGB UR QL STRIP: NEGATIVE
IMM GRANULOCYTES # BLD AUTO: 0.01 K/UL
IMM GRANULOCYTES NFR BLD AUTO: 0.1 %
KETONES UR QL STRIP: NEGATIVE
LACTATE SERPL-SCNC: 2.2 MMOL/L
LACTATE SERPL-SCNC: 2.6 MMOL/L
LEUKOCYTE ESTERASE UR QL STRIP: ABNORMAL
LIPASE SERPL-CCNC: 102 U/L
LYMPHOCYTES # BLD AUTO: 1.9 K/UL
LYMPHOCYTES NFR BLD: 25.6 %
MCH RBC QN AUTO: 25.5 PG
MCHC RBC AUTO-ENTMCNC: 30.8 G/DL
MCV RBC AUTO: 83 FL
MICROSCOPIC COMMENT: ABNORMAL
MONOCYTES # BLD AUTO: 0.8 K/UL
MONOCYTES NFR BLD: 10 %
NEUTROPHILS # BLD AUTO: 4.5 K/UL
NEUTROPHILS NFR BLD: 60.1 %
NITRITE UR QL STRIP: NEGATIVE
NRBC BLD-RTO: 0 /100 WBC
PH UR STRIP: 6 [PH] (ref 5–8)
PLATELET # BLD AUTO: 111 K/UL
PMV BLD AUTO: 11.1 FL
POTASSIUM SERPL-SCNC: 4.8 MMOL/L
PROT SERPL-MCNC: 7.6 G/DL
PROT UR QL STRIP: NEGATIVE
RBC # BLD AUTO: 4.24 M/UL
RBC #/AREA URNS AUTO: 10 /HPF (ref 0–4)
SODIUM SERPL-SCNC: 133 MMOL/L
SP GR UR STRIP: 1 (ref 1–1.03)
SQUAMOUS #/AREA URNS AUTO: 1 /HPF
TTG IGA SER-ACNC: 5 UNITS
URN SPEC COLLECT METH UR: ABNORMAL
WBC # BLD AUTO: 7.47 K/UL
WBC #/AREA URNS AUTO: >100 /HPF (ref 0–5)
WBC CLUMPS UR QL AUTO: ABNORMAL

## 2019-02-04 PROCEDURE — 87086 URINE CULTURE/COLONY COUNT: CPT

## 2019-02-04 PROCEDURE — 63600175 PHARM REV CODE 636 W HCPCS: Performed by: EMERGENCY MEDICINE

## 2019-02-04 PROCEDURE — 87077 CULTURE AEROBIC IDENTIFY: CPT | Mod: 59

## 2019-02-04 PROCEDURE — 93010 ELECTROCARDIOGRAM REPORT: CPT | Mod: ,,, | Performed by: INTERNAL MEDICINE

## 2019-02-04 PROCEDURE — 25000003 PHARM REV CODE 250: Performed by: STUDENT IN AN ORGANIZED HEALTH CARE EDUCATION/TRAINING PROGRAM

## 2019-02-04 PROCEDURE — 83605 ASSAY OF LACTIC ACID: CPT

## 2019-02-04 PROCEDURE — 85025 COMPLETE CBC W/AUTO DIFF WBC: CPT

## 2019-02-04 PROCEDURE — 83690 ASSAY OF LIPASE: CPT

## 2019-02-04 PROCEDURE — 25000003 PHARM REV CODE 250: Performed by: EMERGENCY MEDICINE

## 2019-02-04 PROCEDURE — 25500020 PHARM REV CODE 255: Performed by: PHYSICIAN ASSISTANT

## 2019-02-04 PROCEDURE — 87186 SC STD MICRODIL/AGAR DIL: CPT | Mod: 59

## 2019-02-04 PROCEDURE — 87088 URINE BACTERIA CULTURE: CPT

## 2019-02-04 PROCEDURE — 83605 ASSAY OF LACTIC ACID: CPT | Mod: 91

## 2019-02-04 PROCEDURE — 63600175 PHARM REV CODE 636 W HCPCS: Performed by: PHYSICIAN ASSISTANT

## 2019-02-04 PROCEDURE — 80053 COMPREHEN METABOLIC PANEL: CPT

## 2019-02-04 PROCEDURE — 93005 ELECTROCARDIOGRAM TRACING: CPT

## 2019-02-04 PROCEDURE — 81001 URINALYSIS AUTO W/SCOPE: CPT

## 2019-02-04 PROCEDURE — 93010 EKG 12-LEAD: ICD-10-PCS | Mod: ,,, | Performed by: INTERNAL MEDICINE

## 2019-02-04 RX ORDER — DICYCLOMINE HYDROCHLORIDE 10 MG/ML
20 INJECTION INTRAMUSCULAR
Status: COMPLETED | OUTPATIENT
Start: 2019-02-04 | End: 2019-02-04

## 2019-02-04 RX ORDER — CEPHALEXIN 500 MG/1
500 CAPSULE ORAL EVERY 12 HOURS
Qty: 14 CAPSULE | Refills: 0 | Status: SHIPPED | OUTPATIENT
Start: 2019-02-04 | End: 2019-02-11

## 2019-02-04 RX ORDER — SODIUM CHLORIDE 9 MG/ML
1000 INJECTION, SOLUTION INTRAVENOUS
Status: COMPLETED | OUTPATIENT
Start: 2019-02-04 | End: 2019-02-04

## 2019-02-04 RX ORDER — ONDANSETRON 2 MG/ML
4 INJECTION INTRAMUSCULAR; INTRAVENOUS
Status: COMPLETED | OUTPATIENT
Start: 2019-02-04 | End: 2019-02-04

## 2019-02-04 RX ADMIN — ONDANSETRON 4 MG: 2 INJECTION INTRAMUSCULAR; INTRAVENOUS at 12:02

## 2019-02-04 RX ADMIN — DICYCLOMINE HYDROCHLORIDE 20 MG: 20 INJECTION, SOLUTION INTRAMUSCULAR at 02:02

## 2019-02-04 RX ADMIN — IOHEXOL 75 ML: 350 INJECTION, SOLUTION INTRAVENOUS at 02:02

## 2019-02-04 RX ADMIN — SODIUM CHLORIDE 1000 ML: 0.9 INJECTION, SOLUTION INTRAVENOUS at 02:02

## 2019-02-04 RX ADMIN — SODIUM CHLORIDE 1000 ML: 0.9 INJECTION, SOLUTION INTRAVENOUS at 04:02

## 2019-02-04 NOTE — ED PROVIDER NOTES
Encounter Date: 2/3/2019       History     Chief Complaint   Patient presents with    Abdominal Pain     Abd pain x4 hours. Hx of bowel obstructions.      74-year-old female with history of colon cancer, diabetes, history of multiple SBOs presents for will, constant periumbilical abdominal pain x 5 hours.  Patient reports that she has been having abdominal pain over the past month secondary to what is thought to be foreign body inside her intestine.  Reports associated nausea, watery stools and cloudy urine.  Denies bloody or dark stool, fevers, fatigue, chest pain or shortness of breath.          Review of patient's allergies indicates:   Allergen Reactions    Codeine Nausea And Vomiting     Other reaction(s): Itching    Percocet  [oxycodone-acetaminophen]      Other reaction(s): Itching    Sulfa (sulfonamide antibiotics)      Other reaction(s): Nausea  Other reaction(s): Itching     Past Medical History:   Diagnosis Date    Abdominal pain     Allergic rhinitis     Arthritis     Blood transfusion     during delivery and     Bowel obstruction     Cervical radiculopathy     followed by dr cloud    Colon cancer     transverse colon; resected; Stage IIA (pT3 pN0 MX)    Diarrhea     Family history of breast cancer     Family history of colon cancer     Fatty liver     GERD (gastroesophageal reflux disease)     History of shingles     Hyperlipidemia     Hypothyroidism     Irritable bowel syndrome     Microscopic colitis     treated     Raynaud phenomenon     Raynaud's disease     Type 2 diabetes mellitus      Past Surgical History:   Procedure Laterality Date    APPENDECTOMY      BACK SURGERY      CARPAL TUNNEL RELEASE      bilateral      SECTION      CHOLECYSTECTOMY  1965    COLECTOMY  2011    Transverse colon resection by Dr. Aguirre    COLONOSCOPY N/A 2017    [facility]    COLONOSCOPY N/A 2013     Performed by Angelo Reynolds MD at [facility] (4[facility])    EGD (ESOPHAGOGASTRODUODENOSCOPY) N/A 11/16/2018    Performed by Angelo Reynolds [facility] at [facility] (2ND [facility])    EXPLORATORY-LAPAROTOMY N/A 4/1/2017    Performed by Herman Fletcher [facility] at [facility]    EXPLORATORY-LAPAROTOMY N/A 9/2/2014    Performed by Gonzalez Silvestre MD at [facility]    EYE SURGERY      Cataract Removal    HYSTERECTOMY      LYSIS-ADHESION N/A 4/1/2017    Performed [facility] Herman [facility] at [facility]    LYSIS-ADHESION N/A 9/2/2014    Performed [facility] at [facility]    PLACEMENT  4/1/2017    [facility] Herman [facility] at [facility]    posterolateral fusion with autograft bone and Eun mineralized bone matrix  2/1/13    at [facility] for lumbar spine stenosis    REPAIR  4/1/2017    [facility] at [facility]    RESECTION - SMALL BOWEL N/A 9/2/2014    [facility] at [facility]    TOE SURGERY      TONSILLECTOMY      TRIGGER FINGER RELEASE      ULTRASOUND, UPPER GI TRACT, ENDOSCOPIC N/A 1/23/2019    Performed by Jose Hess MD at [facility] (2ND FLR)    ULTRASOUND, UPPER GI TRACT, ENDOSCOPIC N/A 12/12/2018    [facility] by Jose [facility] at [facility]     Family History   Problem Relation Age of Onset    Heart disease Father 50        Mi age 50    Colon cancer Father     Bladder Cancer Mother         non smoker    Cataracts Mother     Glaucoma Mother     Heart disease Mother     Hyperlipidemia Mother     Kidney disease Mother     Breast cancer Sister 79    Arthritis Daughter     Asthma Daughter     Depression Daughter     Hypertension Daughter     Stroke Daughter 40    Arthritis Brother     Colon cancer Brother 70    Alcohol abuse Brother     Parkinsonism Brother     Alcohol abuse Brother     Depression Daughter     Celiac disease Neg Hx     Cirrhosis Neg Hx     Colon polyps Neg Hx      Crohn's disease Neg Hx     Cystic fibrosis Neg Hx     Esophageal cancer Neg Hx     Hemochromatosis Neg Hx     Inflammatory bowel disease Neg Hx     Irritable bowel syndrome Neg Hx     Liver cancer Neg Hx     Liver disease Neg Hx     Rectal cancer Neg Hx     Stomach cancer Neg Hx     Ulcerative colitis Neg Hx     Eliazar's disease Neg Hx     Amblyopia Neg Hx     Blindness Neg Hx     Macular degeneration Neg Hx     Retinal detachment Neg Hx     Strabismus Neg Hx     Melanoma Neg Hx      Social History     Tobacco Use    Smoking status: Never Smoker    Smokeless tobacco: Never Used   Substance Use Topics    Alcohol use: Yes     Comment: socially, hardly ever    Drug use: No     Review of Systems   Constitutional: Negative for chills, diaphoresis, fatigue and fever.   Respiratory: Negative for cough and shortness of breath.    Cardiovascular: Negative for chest pain, palpitations and leg swelling.   Gastrointestinal: Positive for abdominal pain, diarrhea and nausea. Negative for abdominal distention, anal bleeding, blood in stool, constipation, rectal pain and vomiting.   Endocrine: Negative for polyuria.   Genitourinary: Negative for difficulty urinating, dysuria, flank pain, frequency, hematuria, urgency and vaginal discharge.   Musculoskeletal: Negative for back pain and gait problem.   Skin: Negative for color change and pallor.   Allergic/Immunologic: Negative for immunocompromised state.   Neurological: Negative for light-headedness and headaches.       Physical Exam     Initial Vitals [02/03/19 2347]   BP Pulse Resp Temp SpO2   (!) 152/74 72 18 98.4 °F (36.9 °C) 99 %      MAP       --         Physical Exam    Nursing note and vitals reviewed.  Constitutional: She appears well-developed and well-nourished. She is not diaphoretic. No distress.   HENT:   Head: Normocephalic and atraumatic.   Eyes: EOM are normal. Pupils are equal, round, and reactive to light.   Neck: Normal range of motion.  Neck supple.   Cardiovascular: Normal rate, regular rhythm, normal heart sounds and intact distal pulses. Exam reveals no gallop and no friction rub.    No murmur heard.  Pulmonary/Chest: Breath sounds normal. No respiratory distress. She has no wheezes. She has no rhonchi. She has no rales. She exhibits no tenderness.   Abdominal: Soft. Bowel sounds are normal. She exhibits no distension, no pulsatile liver, no fluid wave, no ascites, no pulsatile midline mass and no mass. There is no hepatosplenomegaly. There is tenderness in the epigastric area and periumbilical area. There is CVA tenderness. There is no rigidity, no rebound, no guarding, no tenderness at McBurney's point and negative Plascencia's sign.   Musculoskeletal: Normal range of motion.   Neurological: She is alert and oriented to person, place, and time.   Skin: Skin is warm and dry.   Psychiatric: She has a normal mood and affect.         ED Course   Procedures  Labs Reviewed   CBC W/ AUTO DIFFERENTIAL - Abnormal; Notable for the following components:       Result Value    Hemoglobin 10.8 (*)     Hematocrit 35.1 (*)     MCH 25.5 (*)     MCHC 30.8 (*)     RDW 17.4 (*)     Platelets 111 (*)     All other components within normal limits   COMPREHENSIVE METABOLIC PANEL   LACTIC ACID, PLASMA   LIPASE   URINALYSIS, REFLEX TO URINE CULTURE     EKG Readings: (Independently Interpreted)   Initial Reading: No STEMI. Previous EKG: Compared with most recent EKG Previous EKG Date: 11/15/2018. Rhythm: Normal Sinus Rhythm. Heart Rate: 72. Ectopy: No Ectopy. Conduction: Normal. Clinical Impression: Normal Sinus Rhythm       Imaging Results    None          Medical Decision Making:   History:   Old Medical Records: I decided to obtain old medical records.  Clinical Tests:   Lab Tests: Ordered and Reviewed  Radiological Study: Ordered and Reviewed  Medical Tests: Ordered and Reviewed       APC / Resident Notes:   74-year-old female presenting for periumbilical abdominal  pain. Patient was scheduled to have CT performed as she may have a foreign object in her intestine.  She states that she had these prior symptoms last time she had a bowel obstruction.  Mildly hypertensive at 152/74, vitals otherwise normal. Epigastric, periumbilical and right-sided CVA tenderness. No rebound or guarding. DDx includes SBO, malignancy, diverticulitis, pancreatitis, UTI.  Will check labs, do CT abdomen pelvis and reassess.    Disposition pending lab workup and CT.  Labs unremarkable and CT negative, I anticipate discharge. I discussed this patient with my supervising physician and I am signing out to Primo Goldberg MD.     1:20 AM  Marge Tanner PA-C           Attending Attestation:     Physician Attestation Statement for NP/PA:   I have conducted a face to face encounter with this patient in addition to the NP/PA, due to Medical Complexity    Other NP/PA Attestation Additions:    History of Present Illness: 74-year-old female presents with complaints of abdominal pain.  She has multiple abdominal surgeries and chronic abdominal pain but has been told that she may have a retained foreign body.  She was sent for an outpatient CT but states the pain return before she was able to get an appointment   Physical Exam: Abdominal exam.  Soft with mild periumbilical tenderness.  No rebound no guarding and no peritonitis   Medical Decision Making: Abdominal pain.  Her characteristic is suggestive of adhesions.  However she is high risk for small bowel obstruction with her multiple surgeries and she possibly has a retained foreign body so will obtain CT    MD Shad                    Clinical Impression:   The encounter diagnosis was Abdominal pain.      Disposition:   Condition: Stable                        Marge Tanner PA-C  02/04/19 0121       Jane Neal MD  02/04/19 0147

## 2019-02-04 NOTE — ED NOTES
Patient information was obtained from patient. Anayeli Judd  History/Exam limitations: none.  Patient presented to the Emergency Department by ambulance where the patient received no medical interventions prior to arrival.    Patient presents with complaints of abdominal pain; was brought in by EMS.  Patient states to longstanding history of bowl obstructions and GI surgery.  Patient complaints of being nauseous, no new episodes of vomiting.        Onset: acute    Duration: 2 days and symptoms are worsening    Associated symptoms: none    Recent treatment: none   Functional status affected: yes   Allergies: yes         · Patient Identifiers for Anayeli Judd checked and correct  · LOC: The patient is awake, alert and aware of environment with an appropriate affect.  Alert to person, place, time and situation.    · APPEARANCE: Patient resting comfortably with no acute distress noted, patient is clean and well groomed, patient's clothing is properly fastened.  · SKIN: The skin is warm and dry, patient has normal skin turgor and moist mucus membranes,no rashes or lesions.  Skin is Intact , No Breakdown Noted  · Musculoskeletal:  Normal range of motion noted. Moves all extremeties well, No swelling or tenderness noted  · RESPIRATORY: Airway is open and patent, respirations are spontaneous, patient has a normal effort, rate & depth with symmetrical expansion.  Bilateral Lungs Sounds are clear.  · CARDIAC: Patient has a normal rate and rhythm, no periphreal edema noted, capillary refill < 3 seconds.   · ABDOMEN: Soft and non tender with palpation.  No obvious distention noted. Bowels Sounds are active & present in all four quadrants.   · PULSES: 2+  And symmetrical in all extremeties  · NEUROLOGIC: Pupils PERRL & Reactive to light.  Facial expression is symmetrical, patient moving all extremities, normal sensation in all extremities when touched with a finger. The patient is awake, alert with calm affect,  patient is aware of environment.   · PSYCHOLOGICAL:  Calm, cooperative and compliant.        Will continue to monitor patient for any acute changes.

## 2019-02-04 NOTE — TELEPHONE ENCOUNTER
----- Message from Tony Perea sent at 2/4/2019 10:50 AM CST -----  Pt is being referred to GI and is an EP of Guerrero Betts. They were able to enter a referral into [facility]. Please contact pt for scheduling,thanks

## 2019-02-05 ENCOUNTER — TELEPHONE (OUTPATIENT)
Dept: HEPATOLOGY | Facility: HOSPITAL | Age: 74
End: 2019-02-05

## 2019-02-05 ENCOUNTER — HOSPITAL ENCOUNTER (OUTPATIENT)
Dept: RADIOLOGY | Facility: HOSPITAL | Age: 74
Discharge: HOME | End: 2019-02-05
Attending: INTERNAL MEDICINE
Payer: MEDICARE

## 2019-02-05 DIAGNOSIS — T18.9XXD FOREIGN BODY ALIMENTARY TRACT, SUBSEQUENT ENCOUNTER: ICD-10-CM

## 2019-02-05 PROCEDURE — 74176 CT ABDOMEN PELVIS WITHOUT CONTRAST: ICD-10-PCS | Mod: 26,,, | Performed by: RADIOLOGY

## 2019-02-05 PROCEDURE — 74176 CT ABD & PELVIS W/O CONTRAST: CPT | Mod: 26,,, | Performed by: RADIOLOGY

## 2019-02-05 PROCEDURE — 74176 CT ABD & PELVIS W/O CONTRAST: CPT | Mod: TC

## 2019-02-06 LAB
BACTERIA UR CULT: NORMAL
BACTERIA UR CULT: NORMAL

## 2019-02-06 NOTE — TELEPHONE ENCOUNTER
Hi,    Can you please let Ms Judd know that her CT scan looks good! The prior noted ?foreign body is no longer present. Likely whatever it was has passed in her stool.    Thanks,  Dr. Betts

## 2019-02-07 ENCOUNTER — TELEPHONE (OUTPATIENT)
Dept: UROGYNECOLOGY | Facility: CLINIC | Age: 74
End: 2019-02-07

## 2019-02-07 ENCOUNTER — OFFICE VISIT (OUTPATIENT)
Dept: UROGYNECOLOGY | Facility: CLINIC | Age: 74
End: 2019-02-07
Payer: MEDICARE

## 2019-02-07 VITALS
DIASTOLIC BLOOD PRESSURE: 60 MMHG | HEIGHT: 62 IN | SYSTOLIC BLOOD PRESSURE: 120 MMHG | WEIGHT: 140.63 LBS | BODY MASS INDEX: 25.88 KG/M2

## 2019-02-07 DIAGNOSIS — N90.89 VULVAR IRRITATION: ICD-10-CM

## 2019-02-07 DIAGNOSIS — Z46.89 PESSARY MAINTENANCE: ICD-10-CM

## 2019-02-07 DIAGNOSIS — R33.9 INCOMPLETE BLADDER EMPTYING: ICD-10-CM

## 2019-02-07 DIAGNOSIS — N39.46 MIXED STRESS AND URGE URINARY INCONTINENCE: ICD-10-CM

## 2019-02-07 DIAGNOSIS — N95.2 VAGINAL ATROPHY: ICD-10-CM

## 2019-02-07 DIAGNOSIS — N39.0 RECURRENT UTI: Primary | ICD-10-CM

## 2019-02-07 PROCEDURE — 99999 PR PBB SHADOW E&M-EST. PATIENT-LVL V: CPT | Mod: PBBFAC,,, | Performed by: NURSE PRACTITIONER

## 2019-02-07 PROCEDURE — 99999 PR PBB SHADOW E&M-EST. PATIENT-LVL V: ICD-10-PCS | Mod: PBBFAC,,, | Performed by: NURSE PRACTITIONER

## 2019-02-07 PROCEDURE — 99215 OFFICE O/P EST HI 40 MIN: CPT | Mod: PBBFAC | Performed by: NURSE PRACTITIONER

## 2019-02-07 PROCEDURE — 99213 PR OFFICE/OUTPT VISIT, EST, LEVL III, 20-29 MIN: ICD-10-PCS | Mod: S$PBB,,, | Performed by: NURSE PRACTITIONER

## 2019-02-07 PROCEDURE — 99213 OFFICE O/P EST LOW 20 MIN: CPT | Mod: S$PBB,,, | Performed by: NURSE PRACTITIONER

## 2019-02-07 RX ORDER — CEPHALEXIN 250 MG/1
250 CAPSULE ORAL ONCE
Qty: 30 CAPSULE | Refills: 6 | Status: SHIPPED | OUTPATIENT
Start: 2019-02-07 | End: 2019-02-07 | Stop reason: SDUPTHER

## 2019-02-07 RX ORDER — CEPHALEXIN 250 MG/1
250 CAPSULE ORAL DAILY
Qty: 30 CAPSULE | Refills: 6 | Status: SHIPPED | OUTPATIENT
Start: 2019-02-07 | End: 2019-08-30 | Stop reason: SDUPTHER

## 2019-02-07 NOTE — TELEPHONE ENCOUNTER
----- Message from Nestor Carmona sent at 2/7/2019  9:28 AM CST -----  Please call pharmacy regarding pt rx [phone]

## 2019-02-07 NOTE — PATIENT INSTRUCTIONS
1)  Mixed urinary incontinence, urge > stress:    --control diabetes  --Empty bladder every 3 hours.  Empty well: wait a minute, lean forward on toilet.    --Avoid dietary irritants (see sheet).  Keep diary x 3-5 days to determine your irritants.  --consider PT in future   --URGE: consider medication (Mirabegron due to SBO).  Takes 2-4 weeks to see if will have effect.  For dry mouth: get sour, sugar free lozenge or gum.    --STRESS:  Pessary vs. Sling.   --continue #2 ring with support pessary     2)  Vaginal atrophy (dryness):  Use dime-sized amount of estrogen cream with finger around vaginal opening/inner lips nightly x 2 weeks then twice weekly.     --continue osphena  --may also reduce bladder infection rate     3)  Vaginal irritation:  --use betamethasone Am  twice weekly  --use estrogen cream  Pm  twice weekly  --use estrogen cream in the vaginal nightly twice weekly  --use moisture barrier ointment    4)  Incomplete bladder emptying:  --will recheck at next visit     5)  Frequent UTIs (bladder infections):  --control diabetes  --work on improving bladder emptying  --continue Keflex 250 mg daily  --If you feel like you have a UTI, please call our office so that we can place an order for you to drop off a urine specimen at the closest [facility] lab (we will arrange).                --We will call in antibiotics for you to start right after you drop off specimen.                --In this way, we can determine:                           1)  Do you have a UTI?                            2) If you have a UTI, is it sensitive to the antibiotics we prescribed?   --follow UTI prevention tips (see attached)  --control bowel movements/fecal cross-contamination  --treat vaginal atrophy (dryness)  --empty bladder before and after intercourse  --consider need for further evaluation (pelvic imaging and cystoscopy) if issue persists; 618 CT A/P  normal     6)  Constipation intermittent with loose stool:  Controlling  constipation may help bladder urgency/leakage and fiber may better control cholesterol and blood glucose.  Increas daily fiber.  Increase fiber by 1 tablet every 3-5 days until stool is easy to pass. Take fiber tablets with large glass of water.  When find place where stool is easy to pass and more well-formed, less loose/accidents,  stop and continue at that dose.   Do not exceed 6 tablets/day.    --May also use over the counter stool softener 1-2 x/day.  --continue use miralax daily--see if taking 1/2 dose is sufficient     7)  RTC 2 weeks for pvr check

## 2019-02-07 NOTE — PROGRESS NOTES
"Urogyn follow up  02/07/2019  .  [facility] BLDG FL 4  [address] [address]    June Hardik Judd  [id number]  1945 June Hardik Judd is a 74 y.o.  here for a urogyn follow up for recurrent uti.    Last HPI from 10/31/2018  1)  Mixed urinary incontinence, urge > stress:    -imrorved with pessary.   Is having some UUI on way to BR.  Going through same amount of pads during day but with min wetness.  Wearing pads at night instead of wetting clothes.    --baseline:   (+) pads (liners or thick, depending on how in control she feels):10/day, usually moderate wetness and keeps pad by bed (does not wear one to "air out"), uses pad on way to BR--usually has to change clothes at night.   Daytime frequency: Q 2 hours.  --controlling diabetes     2)  Vaginal atrophy (dryness):  Use dime-sized amount of estrogen cream with finger around vaginal opening/inner lips nightly.     --using osphena     3)  Vaginal irritation:  --still irritated due to estrogen cream.  Has been out of moisture barrier cream.      4)  Incomplete bladder emptying:  --can't tell if pessary helping; does have moderate 2nd void volumes by straining.     5)  Frequent UTIs (bladder infections):  --has not had recent symptoms  --control diabetes  --work on improving bladder emptying  --continues Keflex 250 mg daily     6)  Constipation intermittent with loose stool:  --better with 4-5 tsps/day  --not needing stool softener  --continue use miralax daily--see if taking 1/2 dose is sufficient     7)  Elevated creatinine:  --improving    Changes from last visit:  1)  Mixed urinary incontinence, urge > stress:    --initially improved with pessary.   Pessary has been out since 11/2018-- was hospitalized for abdominal pain / ingestion of fish bone     2)  Vaginal atrophy (dryness):    --using estrogen cream  --using osphena      3)  Incomplete bladder emptying:  --can't tell if pessary helping; does have moderate 2nd " void volumes by straining.  --currently being treated for uti     4)  Frequent UTIs (bladder infections):  --currently on keflex 500 mg twice daily x 10 days  --control diabetes  --work on improving bladder emptying  --has not been taking 250 mg maintenance dose     5)  Constipation intermittent with loose stool:  --better with 4-5 tsps/day  --not needing stool softener  --continue use miralax daily--see if taking 1/2 dose is sufficient     6)  Elevated creatinine:  --resolved    2018  Cysto with Togami  FINDINGS:  Dome, anterior, posterior, lateral walls and bladder base free of urothelial abnormalities. Right and left ureteral orifices in the normal postion and configuration, both effluxed clear urine.  Bladder neck and urethra were normal    11/15/2018  Retroperitoneal ultrasound  Right kidney: Normal in size measuring 10 cm. Increased cortical echogenicity.  No loss of corticomedullary distinction. Resistive index measures 0.70.  No mass. No renal stone. Mild hydronephrosis.  Left kidney: Normal in size measuring 10.1 cm. Increased cortical echogenicity.  No loss of corticomedullary distinction. Resistive index measures 0.74.  No mass. No renal stone. Mildly prominent collecting system, possibly representing mild hydronephrosis.  Urinary bladder is distended but otherwise unremarkable.  Both ureteral jets are seen.  Impression  Mild bilateral hydronephrosis, right side greater than left, similar to recent CT.  Distended urinary bladder with preservation of bilateral ureteral jets.    Past Medical History:   Diagnosis Date    Abdominal pain     Allergic rhinitis     Arthritis     Blood transfusion     during delivery and     Bowel obstruction     Cervical radiculopathy     followed by dr cloud    Colon cancer     transverse colon; resected; Stage IIA (pT3 pN0 MX)    Diarrhea     Family history of breast cancer     Family history of colon cancer     Fatty liver     GERD  (gastroesophageal reflux disease)     History of shingles     Hyperlipidemia     Hypothyroidism     Irritable bowel syndrome     Microscopic colitis     treated 2013    Raynaud phenomenon     Raynaud's disease     Type 2 diabetes mellitus        Past Surgical History:   Procedure Laterality Date    APPENDECTOMY      BACK SURGERY      CARPAL TUNNEL RELEASE      bilateral      SECTION      CHOLECYSTECTOMY  1965    COLECTOMY  2011    Transverse colon resection by Dr. Aguirre    COLONOSCOPY N/A 2017    Performed by [facility]it [facility] at [facility] (4TH FLR)    COLONOSCOPY N/A 2013    Performed by Angelo Reynolds [facility] at [facility] END[facility] (4TH FLR)    EGD (ESOPHAGOGASTRODUODENOSCOPY) N/A 2018    [facility]formed by [facility] at [facility] ENDO (2ND FLR)    EXPLORATORY-LAPAROTOMY N/A 2017    Performed by Herman Fletcher [facility] at [facility] OR 2ND FLR    EXPLORATORY-LAPAROTOMY N/A 2014    Performed by Gonzalez [facility]vestre [facility] at [facility] OR 2ND FLR    EYE SURGERY      Cataract Removal    HYSTERECTOMY      LYSIS-ADHESION N/A 2017    Performed by Herman Fletcher [facility] at [facility] OR 2ND FLR    LYSIS-ADHESION N/A 2014    Performed by Gonzalez [facility] at [facility] OR 2ND FLR    PLACEMENT  2017    Performed by Herman Fletcher [facility] at [facility] OR 2ND FLR    posterolateral fusion with autograft bone and Eun mineralized bone matrix  13    [facility] stenosis    REPAIR  2017    Performed [facility] Herman [facility] at [facility] 2ND FLR    RESECTION - SMALL BOWEL N/A 2014    Performed by Gonzalez [facility] at [facility] 2ND FLR    TOE SURGERY      TONSILLECTOMY      TRIGGER FINGER RELEASE      ULTRASOUND, UPPER GI TRACT, ENDOSCOPIC N/A 2019    [facility] (2[facility] F[facility])    ULTRASOUND, UPPER GI TRACT, ENDOSCOPIC N/A 2018    [facility]       Current Outpatient Medications  "  Medication Sig    aspirin (ECOTRIN) 81 MG EC tablet Take 81 mg by mouth once daily.    atorvastatin (LIPITOR) 40 MG tablet Take 1 tablet (40 mg total) by mouth once daily.    betamethasone dipropionate (DIPROLENE) 0.05 % ointment Apply every Am x 2 weeks then twice weekly    blood sugar diagnostic Strp Dispense Circa contour strips; test blood sugar once daily    blood-glucose meter (CONTOUR METER) kit Please dispense Madie Contour meter and supplies Use as instructed Test Blood sugar once daily    conjugated estrogens (PREMARIN) vaginal cream Place 0.5 g vaginally every evening.    diphenhydrAMINE (BENADRYL) 25 mg capsule Take 1 each (25 mg total) by mouth nightly as needed for Itching, Allergies or Insomnia.    fluticasone (FLONASE) 50 mcg/actuation nasal spray 2 sprays by Each Nare route once daily.    FREESTYLE EFREN READER [facility] [place] as directed    FREESTYLE EFREN SENSOR Kit     gabapentin (NEURONTIN) 600 MG tablet Take 1,200 mg by mouth 2 (two) times daily.     hydrocortisone 2.5 % cream     insulin glargine (LANTUS SOLOSTAR U-100 INSULIN) 100 unit/mL (3 mL) InPn pen Inject 10 Units into the skin every evening. (Patient taking differently: Inject 12 Units into the skin every evening. )    insulin syringe-needle U-100 0.3 mL 31 gauge x 15/64" Syrg 1 Syringe by Misc.(Non-Drug; Combo Route) route once daily.    ipratropium (ATROVENT) 0.06 % nasal spray     lancets (ONE TOUCH ULTRASOFT LANCETS) Misc Test blood sugar once daily    linagliptin (TRADJENTA) 5 mg Tab tablet Take 1 tablet (5 mg total) by mouth once daily. (Patient taking differently: Take 5 mg by mouth 2 (two) times daily. )    magnesium 30 mg Tab Take 500 capsules by mouth. Patient's taking magnesium 500mg QD    meloxicam (MOBIC) 15 MG tablet TAKE 1 TABLET BY MOUTH DAILY WITH FOOD AS NEEDED FOR PAIN OR ARTHRITIS    metFORMIN (GLUCOPHAGE-XR) 750 MG 24 hr tablet Take 1 tablet (750 mg total) by mouth 2 (two) times daily with meals. " "   metronidazole 0.75% (METROCREAM) 0.75 % Crea     mirabegron 50 mg Tb24 Take 1 tablet (50 mg total) by mouth once daily.    ondansetron (ZOFRAN) 4 MG tablet Take 1 tablet (4 mg total) by mouth every 8 (eight) hours as needed for Nausea.    ospemifene (OSPHENA) 60 mg Tab Take 60 mg by mouth once daily.    pantoprazole (PROTONIX) 40 MG tablet TAKE 1 TABLET BY MOUTH ONCE DAILY    polyethylene glycol (GLYCOLAX) 17 gram/dose powder Take 17 g by mouth once daily.    sucralfate (CARAFATE) 1 gram tablet TAKE 1 TABLET(1 GRAM) BY MOUTH FOUR TIMES DAILY    triamcinolone acetonide 0.1% (KENALOG) 0.1 % paste apply to affected area with A Q-TIP three times a day    VIT A/VIT C/VIT E/ZINC/COPPER (ICAPS AREDS ORAL) Take by mouth 2 (two) times daily.    vitamin D 1000 units Tab Take 185 mg by mouth once daily. D3    azelastine (ASTELIN) 137 mcg nasal spray 1 spray (137 mcg total) by Nasal route 2 (two) times daily.    cephALEXin (KEFLEX) 250 MG capsule Take 1 capsule (250 mg total) by mouth once daily.    levothyroxine (SYNTHROID) 100 MCG tablet TAKE 1 TABLET BY MOUTH EVERY MORNING     Current Facility-Administered Medications   Medication    amoxicillin capsule 500 mg       Well woman:  Pap test: post ANGEL.  History of abnormal paps: Yes--had ANGEL.  History of STIs:  No  Mammogram: Date of last: 2018.  Result: Normal per report ([place])  Colonoscopy: Date of last: 2017.  Result:  Benign polyps.  Repeat due:  2019.    DEXA:  Date of last: 2015.  Result:  Normal.      ROS:  As per HPI.      Exam  /60 (BP Location: Left arm, Patient Position: Sitting, BP Method: Medium (Manual))   Ht 5' 2" (1.575 m)   Wt 63.8 kg (140 lb 10.5 oz)   LMP  (LMP Unknown)   BMI 25.73 kg/m²   General: alert and oriented, no acute distress  Respiratory: normal respiratory effort  Abd: soft, non-tender, non-distended    Pelvic  Ext. Genitalia:external genitalia slightly reddened and irritated. Atrophic. Normal bartholin's and skeens " glands  Vagina: + atrophy. Normal vaginal mucosa without lesions. No discharge noted.    #2 ring with support pessary in place  Cervix: absent  Uterus:  absent   Urethra: no masses or tenderness  Urethral meatus: no lesions, caruncle or prolapse.      Impression  1. Recurrent UTI  cephALEXin (KEFLEX) 250 MG capsule    DISCONTINUED: cephALEXin (KEFLEX) 250 MG capsule   2. Mixed stress and urge urinary incontinence     3. Vaginal atrophy     4. Vulvar irritation     5. Incomplete bladder emptying     6. Pessary maintenance       We reviewed the above issues and discussed options for short-term versus long-term management of her problems.   Plan:     1)  Mixed urinary incontinence, urge > stress:    --control diabetes  --Empty bladder every 3 hours.  Empty well: wait a minute, lean forward on toilet.    --Avoid dietary irritants (see sheet).  Keep diary x 3-5 days to determine your irritants.  --consider PT in future   --URGE: consider medication (Mirabegron due to SBO).  Takes 2-4 weeks to see if will have effect.  For dry mouth: get sour, sugar free lozenge or gum.    --STRESS:  Pessary vs. Sling.   --continue #2 ring with support pessary     2)  Vaginal atrophy (dryness):  Use dime-sized amount of estrogen cream with finger around vaginal opening/inner lips nightly x 2 weeks then twice weekly.     --continue osphena  --may also reduce bladder infection rate     3)  Vulvarl irritation:  --use betamethasone Am  twice weekly  --use estrogen cream  Pm  twice weekly  --use estrogen cream in the vaginal nightly twice weekly  --use moisture barrier ointment    4)  Incomplete bladder emptying:  --will recheck at next visit     5)  Frequent UTIs (bladder infections):  --control diabetes  --work on improving bladder emptying  --continue Keflex 250 mg daily  --If you feel like you have a UTI, please call our office so that we can place an order for you to drop off a urine specimen at the closest [facility] lab (we will arrange).                 --We will call in antibiotics for you to start right after you drop off specimen.                --In this way, we can determine:                           1)  Do you have a UTI?                            2) If you have a UTI, is it sensitive to the antibiotics we prescribed?   --follow UTI prevention tips (see attached)  --control bowel movements/fecal cross-contamination  --treat vaginal atrophy (dryness)  --empty bladder before and after intercourse  --consider need for further evaluation (pelvic imaging and cystoscopy) if issue persists; 618 CT A/P  normal     6)  Constipation intermittent with loose stool:  Controlling constipation may help bladder urgency/leakage and fiber may better control cholesterol and blood glucose.  Increas daily fiber.  Increase fiber by 1 tablet every 3-5 days until stool is easy to pass. Take fiber tablets with large glass of water.  When find place where stool is easy to pass and more well-formed, less loose/accidents,  stop and continue at that dose.   Do not exceed 6 tablets/day.    --May also use over the counter stool softener 1-2 x/day.  --continue use miralax daily--see if taking 1/2 dose is sufficient     7)  RTC 2 weeks for pvr check    30 minutes were spent in face to face time with this patient  90 % of this time was spent in counseling and/or coordination of care    KENNEDY Petit-BC [facility]  Division of Female Pelvic Medicine and Reconstructive Surgery  Department of Obstetrics & Gynecology

## 2019-02-10 DIAGNOSIS — R10.10 PAIN OF UPPER ABDOMEN: ICD-10-CM

## 2019-02-10 DIAGNOSIS — R19.7 DIARRHEA, UNSPECIFIED TYPE: Primary | ICD-10-CM

## 2019-02-11 ENCOUNTER — INITIAL CONSULT (OUTPATIENT)
Dept: VASCULAR SURGERY | Facility: CLINIC | Age: 74
End: 2019-02-11
Payer: MEDICARE

## 2019-02-11 ENCOUNTER — TELEPHONE (OUTPATIENT)
Dept: GASTROENTEROLOGY | Facility: CLINIC | Age: 74
End: 2019-02-11

## 2019-02-11 VITALS
BODY MASS INDEX: 25.97 KG/M2 | SYSTOLIC BLOOD PRESSURE: 120 MMHG | HEIGHT: 62 IN | DIASTOLIC BLOOD PRESSURE: 59 MMHG | TEMPERATURE: 98 F | WEIGHT: 141.13 LBS | HEART RATE: 68 BPM

## 2019-02-11 DIAGNOSIS — K58.2 IRRITABLE BOWEL SYNDROME WITH BOTH CONSTIPATION AND DIARRHEA: Primary | ICD-10-CM

## 2019-02-11 PROCEDURE — 99213 OFFICE O/P EST LOW 20 MIN: CPT | Mod: PBBFAC | Performed by: SURGERY

## 2019-02-11 PROCEDURE — 99214 OFFICE O/P EST MOD 30 MIN: CPT | Mod: S$PBB,,, | Performed by: SURGERY

## 2019-02-11 PROCEDURE — 99999 PR PBB SHADOW E&M-EST. PATIENT-LVL III: CPT | Mod: PBBFAC,,, | Performed by: SURGERY

## 2019-02-11 PROCEDURE — 99999 PR PBB SHADOW E&M-EST. PATIENT-LVL III: ICD-10-PCS | Mod: PBBFAC,,, | Performed by: SURGERY

## 2019-02-11 PROCEDURE — 99214 PR OFFICE/OUTPT VISIT, EST, LEVL IV, 30-39 MIN: ICD-10-PCS | Mod: S$PBB,,, | Performed by: SURGERY

## 2019-02-11 NOTE — LETTER
February 11, 2019      Rocio Mc MD  [address]  [address]  [facility] [address]           [facility] - Vascular Surgery  [address]  [place] [address]  Phone: [phone]  Fax: [phone]          Patient: Anayeli Judd   MR Number: [id number]   YOB: 1945   Date of Visit: 2/11/2019       Dear Dr. Rocio Mc:    Thank you for referring Anayeli Judd to me for evaluation. Attached you will find relevant portions of my assessment and plan of care.    If you have questions, please do not hesitate to call me. I look forward to following Anayeli Judd along with you.    Sincerely,    Tati Ohara MD    Enclosure  CC:  No Recipients    If you would like to receive this communication electronically, please contact [email or web address] or [phone] to request more information on [employer] Link access.    For providers and/or their staff who would like to refer a patient to [facility], please contact us through our one-stop-shop provider referral line, [facility], at [phone].    If you feel you have received this communication in error or would no longer like to receive these types of communications, please e-mail [email or web address]

## 2019-02-11 NOTE — TELEPHONE ENCOUNTER
----- Message from Ramiro Jefferson MD sent at 2/10/2019  1:06 PM CST -----  Viry - please refer Anayeli to Endocrinology for evaluation of her current symptoms and elevated Chromogranin A

## 2019-02-11 NOTE — PROGRESS NOTES
"Patient ID: Anayeli Judd is a 74 y.o. female.    I. HISTORY     Chief Complaint: Consult      HPI Anayeli Judd is a 74 y.o. female referred from Dr. Rocio Mc for evaluation and management of possible mesenteric ischemia. She has had multiple admissions for bowel obstructon and has required laparotomy twice. After meals she has crampy abdominal pain which feels like "digestion". Pain is relieved with BM which occurs 30-45 mins after eating. She does not have post-prandial pain or food fear. Her weight is stable.      Review of Systems   Constitution: Negative for decreased appetite and weight loss.   Cardiovascular: Negative for chest pain and claudication.   Gastrointestinal: Positive for bowel incontinence and diarrhea. Negative for nausea and vomiting.       II. PHYSICAL EXAM   Right Arm BP - Sittin/59 (19 0934)  Left Arm BP - Sittin/60 (19 0934)  Pulse: 68  Temp: 98.2 °F (36.8 °C)  Body mass index is 25.81 kg/m².      Physical Exam   Constitutional: She is oriented to person, place, and time. She appears well-developed and well-nourished.   Pulmonary/Chest: Effort normal. No respiratory distress.   Musculoskeletal: Normal range of motion.   Neurological: She is alert and oriented to person, place, and time.   Skin: Skin is warm and dry.   Psychiatric: She has a normal mood and affect.   Vitals reviewed.      III. ASSESSMENT & PLAN (MEDICAL DECISION MAKING)     1. Irritable bowel syndrome with both constipation and diarrhea        Imaging Results:  Mesenteric duplex - Celiac , SMA     CT - No Celiac or SMA stenosis      Assessment/Diagnosis and Plan:    Anayeli Judd is a 74 y.o. female with abdominal pain and cramps after meals.   Symptoms not related to mesenteric ischemia.  Duplex and CT are normal.    See me back as needed.    Tati Ohara MD FACS [facility]  Vascular & Endovascular Surgery        "

## 2019-02-12 ENCOUNTER — TELEPHONE (OUTPATIENT)
Dept: ENDOCRINOLOGY | Facility: CLINIC | Age: 74
End: 2019-02-12

## 2019-02-14 ENCOUNTER — OFFICE VISIT (OUTPATIENT)
Dept: ENDOCRINOLOGY | Facility: CLINIC | Age: 74
End: 2019-02-14
Payer: MEDICARE

## 2019-02-14 VITALS
BODY MASS INDEX: 26.13 KG/M2 | DIASTOLIC BLOOD PRESSURE: 72 MMHG | HEIGHT: 62 IN | SYSTOLIC BLOOD PRESSURE: 104 MMHG | HEART RATE: 66 BPM | WEIGHT: 142 LBS

## 2019-02-14 DIAGNOSIS — R19.7 DIARRHEA, UNSPECIFIED TYPE: Primary | ICD-10-CM

## 2019-02-14 DIAGNOSIS — R94.7 NONSPECIFIC ABNORMAL RESULTS OF ENDOCRINE FUNCTION STUDY: ICD-10-CM

## 2019-02-14 DIAGNOSIS — E11.9 TYPE 2 DIABETES MELLITUS WITHOUT COMPLICATION, WITHOUT LONG-TERM CURRENT USE OF INSULIN: Chronic | ICD-10-CM

## 2019-02-14 PROCEDURE — 99214 PR OFFICE/OUTPT VISIT, EST, LEVL IV, 30-39 MIN: ICD-10-PCS | Mod: S$PBB,,, | Performed by: INTERNAL MEDICINE

## 2019-02-14 PROCEDURE — 99214 OFFICE O/P EST MOD 30 MIN: CPT | Mod: S$PBB,,, | Performed by: INTERNAL MEDICINE

## 2019-02-14 PROCEDURE — 99999 PR PBB SHADOW E&M-EST. PATIENT-LVL III: CPT | Mod: PBBFAC,,, | Performed by: INTERNAL MEDICINE

## 2019-02-14 PROCEDURE — 99999 PR PBB SHADOW E&M-EST. PATIENT-LVL III: ICD-10-PCS | Mod: PBBFAC,,, | Performed by: INTERNAL MEDICINE

## 2019-02-14 PROCEDURE — 99213 OFFICE O/P EST LOW 20 MIN: CPT | Mod: PBBFAC,PN | Performed by: INTERNAL MEDICINE

## 2019-02-14 NOTE — LETTER
February 18, 2019      Rocio Mc MD  [address]  [address]  [place] [address]           [place] [facility]  [address] [place] [address]  [place] [address]  Phone: [phone]  Fax: [phone]          Patient: Anayeli Judd   MR Number: [id number]   YOB: 1945   Date of Visit: 2/14/2019       Dear Dr. Rocio Mc:    Thank you for referring Anayeli Judd to me for evaluation. Attached you will find relevant portions of my assessment and plan of care.    If you have questions, please do not hesitate to call me. I look forward to following Anayeli Judd along with you.    Sincerely,    Nenita Pastor MD    Enclosure  CC:  No Recipients    If you would like to receive this communication electronically, please contact [email or web address] or [phone] to request more information on [employer] Link access.    For providers and/or their staff who would like to refer a patient to [facility], please contact us through our one-stop-shop provider referral line, [facility], at [phone].    If you feel you have received this communication in error or would no longer like to receive these types of communications, please e-mail [email or web address]

## 2019-02-14 NOTE — PROGRESS NOTES
Subjective:     Patient ID: Anayeli uJdd is a 74 y.o. female.    Chief Complaint: Diabetes    HPI:   Ms. Judd is a 74 y.o. female who is here for a follow-up visit for evaluation of type 2 diabetes mellitus that was diagnosed six years ago. She is here for evaluation for constipation/diarrhea and abdominal pain. Have excluded mesenteric ischemia.     Here to discuss elevated chromogranin A that was obtained by GI for evaluation of diarrhea that had worsened. Currently diarrhea has improved. (better with starch)  Stopped PPI for two weeks and one month prior to both labs, still elevated.   Denies flushing. Normal blood pressure and pulse. No episodic symptoms, increased sweating, headaches or palpitations.     For her T2DM:  Care is complicated by partial colectomy, mild CKD in the past and steroids (back pain, neck pain and sinus infections), currently not on steroids. Has lost eight pounds since last visit.     Today reports, variable blood sugars. Checking blood sugars two to three times a day. Checks when she is tired and lethargic but no hypoglycemia. Feels hungry sometimes and does not fill satisfied.     Currently on:   Metformin 750 mg one tablet twice daily -- at least two years -- diarrhea not worse or better with metformin   Tradjenta 5 mg daily -- for at least one year denies any symptoms (however taking twice a day)    Last year, reported hyperglycemia with BG > 500s at the time had multiple symptoms including blurred vision and dizziness in the context of multiple UTIs, dietary indiscretions.     Also has primary hypothyroidism on replacement with levothyroxine 100 mcg daily. Recent labs demonstrate biochemical thyrotoxicosis. Dr. Mc has lowered to six pills weekly.   Has used this dose for about one year.     Symptoms include:    Denies palpitations. Denies irritability, tremulousness, increased sweating or heat intolerance. Sleeps well but urinates several times per night. She drinks much  "water.     Denies falls or fractures.  Walks regularly.     Review of Systems   Constitutional: Negative for chills and fever.   HENT: Negative for congestion and sinus pressure.    Eyes: Negative for visual disturbance.   Respiratory: Negative for chest tightness and shortness of breath.    Cardiovascular: Negative for chest pain, palpitations and leg swelling.   Gastrointestinal: Negative for abdominal pain and vomiting.   Genitourinary: Negative for dysuria.   Musculoskeletal: Negative for arthralgias.   Skin: Negative for rash.   Neurological: Negative for weakness.   Hematological: Does not bruise/bleed easily.   Psychiatric/Behavioral: Negative for sleep disturbance.        Objective:     Physical Exam    Vitals:    02/14/19 1418   BP: 104/72   Pulse: 66   Weight: 64.4 kg (141 lb 15.6 oz)   Height: 5' 2" (1.575 m)     Results for MAXI ROBERTS (MRN [id number]) as of 2/14/2019 14:28   Ref. Range 2/4/2019 00:29   Sodium Latest Ref Range: 136 - 145 mmol/L 133 (L)   Potassium Latest Ref Range: 3.5 - 5.1 mmol/L 4.8   Chloride Latest Ref Range: 95 - 110 mmol/L 100   CO2 Latest Ref Range: 23 - 29 mmol/L 21 (L)   Anion Gap Latest Ref Range: 8 - 16 mmol/L 12   BUN, Bld Latest Ref Range: 8 - 23 mg/dL 12   Creatinine Latest Ref Range: 0.5 - 1.4 mg/dL 0.9   eGFR if non African American Latest Ref Range: >60 mL/min/1.73 m^2 >60.0   eGFR if African American Latest Ref Range: >60 mL/min/1.73 m^2 >60.0   Glucose Latest Ref Range: 70 - 110 mg/dL 112 (H)   Calcium Latest Ref Range: 8.7 - 10.5 mg/dL 10.0   Alkaline Phosphatase Latest Ref Range: 55 - 135 U/L 60   Total Protein Latest Ref Range: 6.0 - 8.4 g/dL 7.6   Albumin Latest Ref Range: 3.5 - 5.2 g/dL 4.0   Total Bilirubin Latest Ref Range: 0.1 - 1.0 mg/dL 0.9   AST Latest Ref Range: 10 - 40 U/L 28   ALT Latest Ref Range: 10 - 44 U/L 28   Lipase Latest Ref Range: 4 - 60 U/L 102 (H)   Results for MAXI ROBERTS (MRN [id number]) as of 2/14/2019 14:28   Ref. Range 2/1/2019 " 08:36   TSH Latest Ref Range: 0.400 - 4.000 uIU/mL 0.089 (L)   Free T4 Latest Ref Range: 0.71 - 1.51 ng/dL 1.60 (H)   Results for MAXI ROBERTS (MRN [id number]) as of 2/14/2019 14:28   Ref. Range 8/17/2018 16:26 11/12/2018 08:01 11/16/2018 04:09   Hemoglobin A1C External Latest Ref Range: 4.0 - 5.6 % 7.5 (H) 5.9 (H) 5.7 (H)   Estimated Avg Glucose Latest Ref Range: 68 - 131 mg/dL 169 (H) 123 117   Results for MAXI ROBERTS (MRN [id number]) as of 2/14/2019 14:28   Ref. Range 11/19/2018 05:18 2/1/2019 08:36 2/4/2019 00:29   Hemoglobin Latest Ref Range: 12.0 - 16.0 g/dL 9.6 (L) 11.3 (L) 10.8 (L)   Hematocrit Latest Ref Range: 37.0 - 48.5 % 31.2 (L) 37.2 35.1 (L)   Results for MAXI ROBERTS (MRN [id number]) as of 2/14/2019 14:42   Ref. Range 12/19/2018 08:50 2/1/2019 08:36   Chromogranin A Latest Ref Range: 0 - 95 ng/mL 133 (H) 120 (H)       partial colectomy and small bowel resections  Assessment/Plan:       Ms. Roberts is a 74 year old woman who is here for evaluation of hypothyroidism, T2DM, who was found to have elevated levels of chromogranin A done in the context of worsening diarrhea. Of note, diarrhea has improved since testing done.      CgA is a very non specific test, she stopped PPIs prior to both times. To check:  Check 5hiaa urine and plasma  Repeat A1C - if possible will stop metformin as this may be making diarrhea worse.     1. Diarrhea, unspecified type    - 5-HIAA Plasma (Neuroendocrine); Future  - 5 HIAA, quantitative, Urine [facility]; 24 Hours; Future  - Creatinine, urine, timed 24 Hours; Future  - Hemoglobin A1c; Future    2. Nonspecific abnormal results of endocrine function study    - 5-HIAA Plasma (Neuroendocrine); Future  - 5 HIAA, quantitative, Urine [facility]; 24 Hours; Future  - Creatinine, urine, timed 24 Hours; Future  - Hemoglobin A1c; Future    3. Type 2 diabetes mellitus without complication, without long-term current use of insulin    - Hemoglobin A1c; Future

## 2019-02-15 ENCOUNTER — TELEPHONE (OUTPATIENT)
Dept: GASTROENTEROLOGY | Facility: CLINIC | Age: 74
End: 2019-02-15

## 2019-02-15 NOTE — TELEPHONE ENCOUNTER
----- Message from Guerrero Betts MD sent at 2/12/2019  8:45 AM CST -----  Regarding: RE: Somatostatin cancellation/ recollection notification  Thanks, we will need to repeat the lab, but I can plan to do it when she sees me in clinic in like 2 weeks.  ----- Message -----  From: Ramiro Jefferson MD  Sent: 2/12/2019   7:43 AM  To: Richelle Saenz MA, Guerrero Betts MD  Subject: RE: Somatostatin cancellation/ recollection #    Dr. Betts - FYI  ----- Message -----  From: Richelle Saenz MA  Sent: 2/11/2019   4:58 PM  To: Ramiro Jefferson MD  Subject: FW: Somatostatin cancellation/ recollection #        ----- Message -----  From: Keya Henao  Sent: 2/11/2019   4:50 PM  To: Li Lopez  Subject: Somatostatin cancellation/ recollection noti#    The Somatostatin test collected on 02/01/2019 had to be cancelled due to the sample being improperly handled at the reference lab. Please place a new order and have a new sample submitted if this test is still needed.    If you have any questions, please call the  Send Out Lab at [phone], ext [phone]

## 2019-02-19 ENCOUNTER — LAB VISIT (OUTPATIENT)
Dept: LAB | Facility: HOSPITAL | Age: 74
End: 2019-02-19
Attending: INTERNAL MEDICINE
Payer: MEDICARE

## 2019-02-19 ENCOUNTER — TELEPHONE (OUTPATIENT)
Dept: ENDOCRINOLOGY | Facility: CLINIC | Age: 74
End: 2019-02-19

## 2019-02-19 DIAGNOSIS — R94.7 NONSPECIFIC ABNORMAL RESULTS OF ENDOCRINE FUNCTION STUDY: ICD-10-CM

## 2019-02-19 DIAGNOSIS — R19.7 DIARRHEA, UNSPECIFIED TYPE: ICD-10-CM

## 2019-02-19 LAB
CREAT 24H UR-MRATE: 35.8 MG/HR
CREAT UR-MCNC: 33 MG/DL
CREATININE, URINE (MG/SPEC): 858 MG/SPEC
URINE COLLECTION DURATION: 24 HR
URINE VOLUME: 2600 ML

## 2019-02-19 PROCEDURE — 83497 ASSAY OF 5-HIAA: CPT

## 2019-02-19 PROCEDURE — 82570 ASSAY OF URINE CREATININE: CPT

## 2019-02-19 NOTE — TELEPHONE ENCOUNTER
----- Message from Mindi Garcia sent at 2/19/2019  9:40 AM CST -----  Contact: [place] [id number] pAT  Lab called to let you know Pt needs to reschedule aptt b/c it is specalty Test- 5-HIAA, must be completed @ main campus & Pt needs to fast. Will send Pt home to reschedule.      
Left a message for pt to call back and see if she can come in this Thursday to draw her lab work.   
No

## 2019-02-20 ENCOUNTER — OFFICE VISIT (OUTPATIENT)
Dept: UROGYNECOLOGY | Facility: CLINIC | Age: 74
End: 2019-02-20
Payer: MEDICARE

## 2019-02-20 ENCOUNTER — TELEPHONE (OUTPATIENT)
Dept: ENDOCRINOLOGY | Facility: CLINIC | Age: 74
End: 2019-02-20

## 2019-02-20 VITALS
WEIGHT: 138 LBS | HEIGHT: 62 IN | SYSTOLIC BLOOD PRESSURE: 120 MMHG | BODY MASS INDEX: 25.4 KG/M2 | DIASTOLIC BLOOD PRESSURE: 60 MMHG

## 2019-02-20 DIAGNOSIS — N39.46 MIXED STRESS AND URGE URINARY INCONTINENCE: Primary | ICD-10-CM

## 2019-02-20 DIAGNOSIS — R33.9 INCOMPLETE EMPTYING OF BLADDER: ICD-10-CM

## 2019-02-20 DIAGNOSIS — Z46.89 PESSARY MAINTENANCE: ICD-10-CM

## 2019-02-20 DIAGNOSIS — K59.00 CONSTIPATION, UNSPECIFIED CONSTIPATION TYPE: ICD-10-CM

## 2019-02-20 DIAGNOSIS — N39.0 FREQUENT UTI: ICD-10-CM

## 2019-02-20 DIAGNOSIS — N95.2 VAGINAL ATROPHY: ICD-10-CM

## 2019-02-20 DIAGNOSIS — N90.89 VULVAR IRRITATION: ICD-10-CM

## 2019-02-20 PROCEDURE — 51701 INSERT BLADDER CATHETER: CPT | Mod: 58,S$PBB,, | Performed by: NURSE PRACTITIONER

## 2019-02-20 PROCEDURE — 51701 PR INSERTION OF NON-INDWELLING BLADDER CATHETERIZATION FOR RESIDUAL UR: ICD-10-PCS | Mod: 58,S$PBB,, | Performed by: NURSE PRACTITIONER

## 2019-02-20 PROCEDURE — 51701 INSERT BLADDER CATHETER: CPT | Mod: PBBFAC | Performed by: NURSE PRACTITIONER

## 2019-02-20 PROCEDURE — 99999 PR PBB SHADOW E&M-EST. PATIENT-LVL V: ICD-10-PCS | Mod: PBBFAC,,, | Performed by: NURSE PRACTITIONER

## 2019-02-20 PROCEDURE — 99999 PR PBB SHADOW E&M-EST. PATIENT-LVL V: CPT | Mod: PBBFAC,,, | Performed by: NURSE PRACTITIONER

## 2019-02-20 PROCEDURE — 99213 OFFICE O/P EST LOW 20 MIN: CPT | Mod: 25,S$PBB,, | Performed by: NURSE PRACTITIONER

## 2019-02-20 PROCEDURE — 99215 OFFICE O/P EST HI 40 MIN: CPT | Mod: PBBFAC,25 | Performed by: NURSE PRACTITIONER

## 2019-02-20 PROCEDURE — 99213 PR OFFICE/OUTPT VISIT, EST, LEVL III, 20-29 MIN: ICD-10-PCS | Mod: 25,S$PBB,, | Performed by: NURSE PRACTITIONER

## 2019-02-20 NOTE — PATIENT INSTRUCTIONS
1)  Mixed urinary incontinence, urge > stress:    --control diabetes  --Empty bladder every 3 hours.  Empty well: wait a minute, lean forward on toilet.    --Avoid dietary irritants (see sheet).  Keep diary x 3-5 days to determine your irritants.  --consider PT in future   --URGE: consider medication (Mirabegron due to SBO).  Takes 2-4 weeks to see if will have effect.  For dry mouth: get sour, sugar free lozenge or gum.    --STRESS:  Pessary vs. Sling.   --continue #2 ring with support pessary     2)  Vaginal atrophy (dryness):  Use dime-sized amount of estrogen cream with finger around vaginal opening/inner lips nightly x 2 weeks then twice weekly.     --continue osphena  --may also reduce bladder infection rate     3)  Vulvarl irritation:  --use betamethasone Am  twice weekly  --use estrogen cream  Pm  twice weekly  --use estrogen cream in the vaginal nightly twice weekly  --use moisture barrier ointment    4)  Incomplete bladder emptying:  --resolved with pessary     5)  Frequent UTIs (bladder infections):  --control diabetes  --work on improving bladder emptying  --continue Keflex 250 mg daily  --If you feel like you have a UTI, please call our office so that we can place an order for you to drop off a urine specimen at the closest [facility] lab (we will arrange).                --We will call in antibiotics for you to start right after you drop off specimen.                --In this way, we can determine:                           1)  Do you have a UTI?                            2) If you have a UTI, is it sensitive to the antibiotics we prescribed?   --follow UTI prevention tips (see attached)  --control bowel movements/fecal cross-contamination  --treat vaginal atrophy (dryness)  --empty bladder before and after intercourse  --consider need for further evaluation (pelvic imaging and cystoscopy) if issue persists; 618 CT A/P  normal     6)  Constipation intermittent with loose stool:  Controlling  constipation may help bladder urgency/leakage and fiber may better control cholesterol and blood glucose.  Increas daily fiber.  Increase fiber by 1 tablet every 3-5 days until stool is easy to pass. Take fiber tablets with large glass of water.  When find place where stool is easy to pass and more well-formed, less loose/accidents,  stop and continue at that dose.   Do not exceed 6 tablets/day.    --May also use over the counter stool softener 1-2 x/day.  --continue use miralax daily--see if taking 1/2 dose is sufficient     7)  RTC 3 months for pc

## 2019-02-20 NOTE — PROGRESS NOTES
Urogyn follow up  02/20/2019  .  [facility] 4  [address] [address]    June Hardik Judd  [id number]  1945 June Hardik Judd is a 74 y.o.  here for a urogyn follow up for recurrent uti.    Last HPI from 02/07/2019  1)  Mixed urinary incontinence, urge > stress:    --initially improved with pessary.   Pessary has been out since 11/2018-- was hospitalized for abdominal pain / ingestion of fish bone     2)  Vaginal atrophy (dryness):    --using estrogen cream  --using osphena      3)  Incomplete bladder emptying:  --can't tell if pessary helping; does have moderate 2nd void volumes by straining.  --currently being treated for uti     4)  Frequent UTIs (bladder infections):  --currently on keflex 500 mg twice daily x 10 days  --control diabetes  --work on improving bladder emptying  --has not been taking 250 mg maintenance dose     5)  Constipation intermittent with loose stool:  --better with 4-5 tsps/day  --not needing stool softener  --continue use miralax daily--see if taking 1/2 dose is sufficient     6)  Elevated creatinine:  --resolved      Changes from last visit:  1)  Mixed urinary incontinence, urge > stress:    --initially improved with pessary.   Pessary has been out since 11/2018-- was hospitalized for abdominal pain / ingestion of fish bone     2)  Vaginal atrophy (dryness):    --using estrogen cream  --using osphena      3)  Incomplete bladder emptying:  --can't tell if pessary helping; does have moderate 2nd void volumes by straining.  --currently being treated for uti     4)  Frequent UTIs (bladder infections):  --currently on keflex 500 mg twice daily x 10 days  --control diabetes  --work on improving bladder emptying  --has not been taking 250 mg maintenance dose     5)  Constipation intermittent with loose stool:  --better with 4-5 tsps/day  --not needing stool softener  --continue use miralax daily--see if taking 1/2 dose is  sufficient     6)  Elevated creatinine:  --resolved    2018  Cysto with Togami  FINDINGS:  Dome, anterior, posterior, lateral walls and bladder base free of urothelial abnormalities. Right and left ureteral orifices in the normal postion and configuration, both effluxed clear urine.  Bladder neck and urethra were normal    11/15/2018  Retroperitoneal ultrasound  Right kidney: Normal in size measuring 10 cm. Increased cortical echogenicity.  No loss of corticomedullary distinction. Resistive index measures 0.70.  No mass. No renal stone. Mild hydronephrosis.  Left kidney: Normal in size measuring 10.1 cm. Increased cortical echogenicity.  No loss of corticomedullary distinction. Resistive index measures 0.74.  No mass. No renal stone. Mildly prominent collecting system, possibly representing mild hydronephrosis.  Urinary bladder is distended but otherwise unremarkable.  Both ureteral jets are seen.  Impression  Mild bilateral hydronephrosis, right side greater than left, similar to recent CT.  Distended urinary bladder with preservation of bilateral ureteral jets.    Past Medical History:   Diagnosis Date    Abdominal pain     Allergic rhinitis     Arthritis     Blood transfusion     during delivery and     Bowel obstruction     Cervical radiculopathy     followed by dr cloud    Colon cancer     transverse colon; resected; Stage IIA (pT3 pN0 MX)    Diarrhea     Family history of breast cancer     Family history of colon cancer     Fatty liver     GERD (gastroesophageal reflux disease)     History of shingles     Hyperlipidemia     Hypothyroidism     Irritable bowel syndrome     Microscopic colitis     treated     Raynaud phenomenon     Raynaud's disease     Type 2 diabetes mellitus        Past Surgical History:   Procedure Laterality Date    APPENDECTOMY      BACK SURGERY      CARPAL TUNNEL RELEASE      bilateral      SECTION      CHOLECYSTECTOMY       COLECTOMY  06/27/2011    Transverse colon resection by Dr. Aguirre    COLONOSCOPY N/A 7/27/2017    [facility] ([facility])    COLONOSCOPY N/A 5/2/2013    [facility] Angelo Reynolds [facility] at [facility] ([facility])    EGD (ESOPHAGOGASTRODUODENOSCOPY) N/A 11/16/2018    [facility] (2ND FLR)    EXPLORATORY-LAPAROTOMY N/A 4/1/2017    [facility] Herman Fletcher [facility] 2ND FLR    EXPLORATORY-LAPAROTOMY N/A 9/2/2014    Performed by Gonzalez Silvestre [facility] at [facility] 2[facility]    EYE SURGERY      Cataract Removal    HYSTERECTOMY      LYSIS-ADHESION N/A 4/1/2017    Per[facility] Herman [facility] 2[facility]    LYSIS-ADHESION N/A 9/2/2014    Performed [facility] Gonzalez [facility] at [facility] 2[facility]    PLACEMENT  4/1/2017    Per[facility] Herman [facility] 2[facility]    posterolateral fusion with autograft bone and [place] mineralized bone matrix  2/1/13    at [facility] for [facility] stenosis    REPAIR  4/1/2017    [facility] Herman [facility] 2[facility]    RESECTION - SMALL BOWEL N/A 9/2/2014    Per[facility] Gonzalez [facility] at [facility] 2[facility]    TOE SURGERY      TONSILLECTOMY      TRIGGER FINGER RELEASE      ULTRASOUND, UPPER GI TRACT, ENDOSCOPIC N/A 1/23/2019    Performed by Jose Hess MD at [facility] (2ND FLR)    ULTRASOUND, UPPER GI TRACT, ENDOSCOPIC N/A 12/12/2018    Performed by Jose [facility] at [facility]       Current Outpatient Medications   Medication Sig    aspirin (ECOTRIN) 81 MG EC tablet Take 81 mg by mouth once daily.    atorvastatin (LIPITOR) 40 MG tablet Take 1 tablet (40 mg total) by mouth once daily.    betamethasone dipropionate (DIPROLENE) 0.05 % ointment Apply every Am x 2 weeks then twice weekly    blood sugar diagnostic Strp Dispense [employer] contour strips; test blood sugar once daily    blood-glucose meter (CONTOUR METER) kit Please dispense Madie Contour  "meter and supplies Use as instructed Test Blood sugar once daily    cephALEXin (KEFLEX) 250 MG capsule Take 1 capsule (250 mg total) by mouth once daily.    conjugated estrogens (PREMARIN) vaginal cream Place 0.5 g vaginally every evening.    diphenhydrAMINE (BENADRYL) 25 mg capsule Take 1 each (25 mg total) by mouth nightly as needed for Itching, Allergies or Insomnia.    fluticasone (FLONASE) 50 mcg/actuation nasal spray 2 sprays by Each Nare route once daily.    FREESTYLE EFREN READER [facility] TEST as directed    FREESTYLE EFREN SENSOR Kit     gabapentin (NEURONTIN) 600 MG tablet Take 1,200 mg by mouth 2 (two) times daily.     hydrocortisone 2.5 % cream     insulin syringe-needle U-100 0.3 mL 31 gauge x 15/64" Syrg 1 Syringe by Misc.(Non-Drug; Combo Route) route once daily.    ipratropium (ATROVENT) 0.06 % nasal spray     lancets (ONE TOUCH ULTRASOFT LANCETS) Misc Test blood sugar once daily    levothyroxine (SYNTHROID) 100 MCG tablet TAKE 1 TABLET BY MOUTH EVERY MORNING    linagliptin (TRADJENTA) 5 mg Tab tablet Take 1 tablet (5 mg total) by mouth once daily. (Patient taking differently: Take 5 mg by mouth 2 (two) times daily. )    magnesium 30 mg Tab Take 500 capsules by mouth. Patient's taking magnesium 500mg QD    meloxicam (MOBIC) 15 MG tablet TAKE 1 TABLET BY MOUTH DAILY WITH FOOD AS NEEDED FOR PAIN OR ARTHRITIS    metFORMIN (GLUCOPHAGE-XR) 750 MG 24 hr tablet Take 1 tablet (750 mg total) by mouth 2 (two) times daily with meals.    metronidazole 0.75% (METROCREAM) 0.75 % Crea     mirabegron 50 mg Tb24 Take 1 tablet (50 mg total) by mouth once daily.    ondansetron (ZOFRAN) 4 MG tablet Take 1 tablet (4 mg total) by mouth every 8 (eight) hours as needed for Nausea.    ospemifene (OSPHENA) 60 mg Tab Take 60 mg by mouth once daily.    pantoprazole (PROTONIX) 40 MG tablet TAKE 1 TABLET BY MOUTH ONCE DAILY    polyethylene glycol (GLYCOLAX) 17 gram/dose powder Take 17 g by mouth once daily.    " "sucralfate (CARAFATE) 1 gram tablet TAKE 1 TABLET(1 GRAM) BY MOUTH FOUR TIMES DAILY    triamcinolone acetonide 0.1% (KENALOG) 0.1 % paste apply to affected area with A Q-TIP three times a day    VIT A/VIT C/VIT E/ZINC/COPPER (ICAPS AREDS ORAL) Take by mouth 2 (two) times daily.    vitamin D 1000 units Tab Take 185 mg by mouth once daily. D3    azelastine (ASTELIN) 137 mcg nasal spray 1 spray (137 mcg total) by Nasal route 2 (two) times daily.     Current Facility-Administered Medications   Medication    amoxicillin capsule 500 mg       Well woman:  Pap test: post ANGEL.  History of abnormal paps: Yes--had ANGEL.  History of STIs:  No  Mammogram: Date of last: 2018.  Result: Normal per report (Anjelicao)  Colonoscopy: Date of last: 2017.  Result:  Benign polyps.  Repeat due:  2019.    DEXA:  Date of last: 2015.  Result:  Normal.      ROS:  As per HPI.      Exam  /60 (BP Location: Right arm, Patient Position: Sitting, BP Method: Medium (Manual))   Ht 5' 2" (1.575 m)   Wt 62.6 kg (138 lb 0.1 oz)   LMP  (LMP Unknown)   BMI 25.24 kg/m²   General: alert and oriented, no acute distress  Respiratory: normal respiratory effort  Abd: soft, non-tender, non-distended    Pelvic  Ext. Genitalia:external genitalia slightly reddened and irritated. Atrophic. Normal bartholin's and skeens glands  Vagina: + atrophy. Normal vaginal mucosa without lesions. No discharge noted.    #2 ring with support pessary in place  Cervix: absent  Uterus:  absent   Urethra: no masses or tenderness  Urethral meatus: no lesions, caruncle or prolapse.    PVR 80 mL  Pessary removed without difficulty. Washed with soap and water. Reinserted without difficulty.    Impression  1. Mixed stress and urge urinary incontinence     2. Vaginal atrophy     3. Vulvar irritation     4. Frequent UTI     5. Constipation, unspecified constipation type     6. Pessary maintenance       We reviewed the above issues and discussed options for short-term versus " long-term management of her problems.   Plan:     1)  Mixed urinary incontinence, urge > stress:    --control diabetes  --Empty bladder every 3 hours.  Empty well: wait a minute, lean forward on toilet.    --Avoid dietary irritants (see sheet).  Keep diary x 3-5 days to determine your irritants.  --consider PT in future   --URGE: consider medication (Mirabegron due to SBO).  Takes 2-4 weeks to see if will have effect.  For dry mouth: get sour, sugar free lozenge or gum.    --STRESS:  Pessary vs. Sling.   --continue #2 ring with support pessary     2)  Vaginal atrophy (dryness):  Use dime-sized amount of estrogen cream with finger around vaginal opening/inner lips  twice weekly.     --continue osphena  --may also reduce bladder infection rate     3)  Vulvarl irritation:  --use betamethasone Am  twice weekly  --use estrogen cream  Pm  twice weekly  --use estrogen cream in the vaginal nightly twice weekly  --use moisture barrier ointment    4)  Incomplete bladder emptying:  --resolved with pessary     5)  Frequent UTIs (bladder infections):  --control diabetes  --work on improving bladder emptying  --continue Keflex 250 mg daily  --If you feel like you have a UTI, please call our office so that we can place an order for you to drop off a urine specimen at the closest [facility] lab (we will arrange).                --We will call in antibiotics for you to start right after you drop off specimen.                --In this way, we can determine:                           1)  Do you have a UTI?                            2) If you have a UTI, is it sensitive to the antibiotics we prescribed?   --follow UTI prevention tips (see attached)  --control bowel movements/fecal cross-contamination  --treat vaginal atrophy (dryness)  --empty bladder before and after intercourse  --consider need for further evaluation (pelvic imaging and cystoscopy) if issue persists; 618 CT A/P  normal     6)  Constipation intermittent with loose  stool:  Controlling constipation may help bladder urgency/leakage and fiber may better control cholesterol and blood glucose.  Increas daily fiber.  Increase fiber by 1 tablet every 3-5 days until stool is easy to pass. Take fiber tablets with large glass of water.  When find place where stool is easy to pass and more well-formed, less loose/accidents,  stop and continue at that dose.   Do not exceed 6 tablets/day.    --May also use over the counter stool softener 1-2 x/day.  --continue use miralax daily--see if taking 1/2 dose is sufficient     7)  RTC 3 months for pc    30 minutes were spent in face to face time with this patient  90 % of this time was spent in counseling and/or coordination of care    KENNEDY Petit-BC  [facility]  Division of Female Pelvic Medicine and Reconstructive Surgery  Department of Obstetrics & Gynecology

## 2019-02-21 ENCOUNTER — APPOINTMENT (OUTPATIENT)
Dept: LAB | Facility: HOSPITAL | Age: 74
End: 2019-02-21
Attending: INTERNAL MEDICINE
Payer: MEDICARE

## 2019-02-21 PROCEDURE — 83036 HEMOGLOBIN GLYCOSYLATED A1C: CPT

## 2019-02-21 PROCEDURE — 82542 COL CHROMOTOGRAPHY QUAL/QUAN: CPT

## 2019-02-21 PROCEDURE — 36415 COLL VENOUS BLD VENIPUNCTURE: CPT | Mod: PO

## 2019-02-22 LAB
5HIAA & CREATININE UR-IMP: NORMAL
5OH-INDOLEACETATE 24H UR-MCNC: 1.7 MG/L
5OH-INDOLEACETATE 24H UR-MRATE: 4 MG/D (ref 0–15)
5OH-INDOLEACETATE/CREAT 24H UR: 5 MG/GCR (ref 0–14)
COLLECT DURATION TIME SPEC: 24 HR
CREAT 24H UR-MRATE: 858 MG/D (ref 500–1400)
CREAT UR-MCNC: 33 MG/DL
SPECIMEN VOL ?TM UR: 2600 ML

## 2019-02-28 ENCOUNTER — OFFICE VISIT (OUTPATIENT)
Dept: GASTROENTEROLOGY | Facility: CLINIC | Age: 74
End: 2019-02-28
Payer: MEDICARE

## 2019-02-28 ENCOUNTER — TELEPHONE (OUTPATIENT)
Dept: GASTROENTEROLOGY | Facility: CLINIC | Age: 74
End: 2019-02-28

## 2019-02-28 VITALS
DIASTOLIC BLOOD PRESSURE: 65 MMHG | SYSTOLIC BLOOD PRESSURE: 107 MMHG | BODY MASS INDEX: 25.8 KG/M2 | WEIGHT: 140.19 LBS | HEART RATE: 62 BPM | HEIGHT: 62 IN

## 2019-02-28 DIAGNOSIS — T18.9XXD FOREIGN BODY ALIMENTARY TRACT, SUBSEQUENT ENCOUNTER: Primary | ICD-10-CM

## 2019-02-28 PROCEDURE — 99213 PR OFFICE/OUTPT VISIT, EST, LEVL III, 20-29 MIN: ICD-10-PCS | Mod: S$PBB,GC,, | Performed by: INTERNAL MEDICINE

## 2019-02-28 PROCEDURE — 99213 OFFICE O/P EST LOW 20 MIN: CPT | Mod: PBBFAC | Performed by: INTERNAL MEDICINE

## 2019-02-28 PROCEDURE — 99999 PR PBB SHADOW E&M-EST. PATIENT-LVL III: ICD-10-PCS | Mod: PBBFAC,GC,, | Performed by: INTERNAL MEDICINE

## 2019-02-28 PROCEDURE — 99999 PR PBB SHADOW E&M-EST. PATIENT-LVL III: CPT | Mod: PBBFAC,GC,, | Performed by: INTERNAL MEDICINE

## 2019-02-28 PROCEDURE — 99213 OFFICE O/P EST LOW 20 MIN: CPT | Mod: S$PBB,GC,, | Performed by: INTERNAL MEDICINE

## 2019-02-28 NOTE — PROGRESS NOTES
[facility]    Reason for visit: The encounter diagnosis was Foreign body alimentary tract, subsequent encounter.  Referring Provider/PCP: Rocio Mc MD    History of Present Illness:  Anayeli Judd is a 74 y.o. female with a history of CRC, multiple admissions for SBO, IBS, microscopic colitis, HLD, T2DM, GERD, Hypothyroidism who is presenting for follow-up evaluation of abdominal pain.    She notes that she has been doing well of late. She was seen in the ED (2/3/19) due to abdominal pain and underwent negative CT scan and was discharged home. She notes that after this she had taken mag citrate with improvement of her abdominal pain (normally occurs with concurrent constipation). She notes that though he pain will occur intermittently (baseline for years) it is currently resolved. Able to tolerate PO without issue. Endorsed increased reflux while being off protonix for blood work but has resolved since resuming protonix. Denies any dysphagia or odynophagia.    Regarding bowel movements, she notes her bowel movements are 5-6/day, generally starting formed in the morning and progressively more loose. This has been her baseline for several years. She notes that she does have occasional fecal incontinence which has been ongoing for years as well. She endorses prior workup including negative defecography and has attempted other treatments including PT without improvement. She was seen by Dr. Erazo ([facility]) for incontinence and was briefly considered for interstim but appears to have been not further considered as incontinence improved with fiber.     Regarding the foreign body seen on prior CT imaging (new since CT 4/2018), she notes on further history that she does recall a time (cannot remember the date) that she accidentally swallowed a chicken bone which briefly became stuck in her esophagus but then passed without intervention (was not seen in the hospital). On recent follow-up  CT (2019) the bone was not seen, but she had one day prior had PO contrast therefore cannot exclude presence in remainder of the bowel.    Review otherwise unremarkable.    PEndoHx:  EUS. (19) Provider: Dr. Hess. Indication: CT Abnormality.   - heterogeneous appearance of liver  - normal appearance of the pancreas  - minor superficial mucosal tear at the cricopharyngeus  - non-bleeding gastric ulcer    EGD. (18). Dr. Reynolds. Indication:Abdominal pain.  - normal study  - duodenal and gastric biopsies negative. Negative HPylori.     Colonoscopy. (17) Provider: Dr. Alvarez. Indication: Diarrhea. Extent: Cecum. Preparation:Adequate.  - 5mm transverse polyp  - 2x 3mm transverse polyp [tubular adenoma]  - random colonic biopsies [negative for microscopic colitis]    Review of Systems:   Constitutional: no fever, chills or change in weight   Eyes: no visual changes   ENT: no sore throat or dysphagia  Respiratory: no cough or shortness of breath   Cardiovascular: no chest pain or palpitations   Gastrointestinal: as per HPI  Genitourinary: no dysuria or hematuria. Urinary incontinence improved s/p pessery  Hematologic/Lymphatic: no easy bruising or lymphadenopathy   Musculoskeletal: no arthralgias or myalgias   Neurological: no change in mental status  Behavioral/Psych: no change in mood    Medical History:  Past Medical History:   Diagnosis Date    Abdominal pain     Allergic rhinitis     Arthritis     Blood transfusion     during delivery and     Bowel obstruction     Cervical radiculopathy     followed by dr reynolds    Colon cancer     transverse colon; resected; Stage IIA (pT3 pN0 MX)    Diarrhea     Family history of breast cancer     Family history of colon cancer     Fatty liver     GERD (gastroesophageal reflux disease)     History of shingles     Hyperlipidemia     Hypothyroidism     Irritable bowel syndrome     Microscopic colitis     treated     Raynaud phenomenon      Raynaud's disease     Type 2 diabetes mellitus        Past Surgical History:   Procedure Laterality Date    APPENDECTOMY      BACK SURGERY      CARPAL TUNNEL RELEASE      bilateral      SECTION      CHOLECYSTECTOMY  1965    COLECTOMY  2011    Transverse colon resection by Dr. Aguirre    COLONOSCOPY N/A 2017    Performed by [facility]it [facility] at [facility] (4TH FLR)    COLONOSCOPY N/A 2013    Performed by Angelo Reynolds [facility] at [facility] (4TH FLR)    EGD (ESOPHAGOGASTRODUODENOSCOPY) N/A 2018    Performed by [facility] at [facility] (2ND FLR)    EXPLORATORY-LAPAROTOMY N/A 2017    [facility]formed [facility] Herman Fletcher [facility] at [facility] 2ND FLR    EXPLORATORY-LAPAROTOMY N/A 2014    Performed by [facility] at [facility] OR 2ND FLR    EYE SURGERY      Cataract Removal    HYSTERECTOMY      LYSIS-ADHESION N/A 2017    [facility]formed [facility] 2[facility]LR    LYSIS-ADHESION N/A 2014    [facility] 2[facility]LR    PLACEMENT  2017    Performed by [facility] at [facility] 2[facility] FLR    posterolateral fusion with autograft bone and Eun mineralized bone matrix  [facility]    REPAIR  2017    [facility]    RESECTION - SMALL BOWEL N/A 2014    [facility]    TOE SURGERY      TONSILLECTOMY      TRIGGER FINGER RELEASE      ULTRASOUND, UPPER GI TRACT, ENDOSCOPIC N/A 2019    [facility] ([facility])    ULTRASOUND, UPPER GI TRACT, ENDOSCOPIC N/A 2018    [facility]       Family History   Problem Relation Age of Onset    Heart disease Father 50        Mi age 50    Colon cancer Father     Bladder Cancer Mother         non smoker    Cataracts Mother     Glaucoma Mother     Heart disease Mother      Hyperlipidemia Mother     Kidney disease Mother     Breast cancer Sister 79    Arthritis Daughter     Asthma Daughter     Depression Daughter     Hypertension Daughter     Stroke Daughter 40    Arthritis Brother     Colon cancer Brother 70    Alcohol abuse Brother     Parkinsonism Brother     Alcohol abuse Brother     Depression Daughter     Celiac disease Neg Hx     Cirrhosis Neg Hx     Colon polyps Neg Hx     Crohn's disease Neg Hx     Cystic fibrosis Neg Hx     Esophageal cancer Neg Hx     Hemochromatosis Neg Hx     Inflammatory bowel disease Neg Hx     Irritable bowel syndrome Neg Hx     Liver cancer Neg Hx     Liver disease Neg Hx     Rectal cancer Neg Hx     Stomach cancer Neg Hx     Ulcerative colitis Neg Hx     Eliazar's disease Neg Hx     Amblyopia Neg Hx     Blindness Neg Hx     Macular degeneration Neg Hx     Retinal detachment Neg Hx     Strabismus Neg Hx     Melanoma Neg Hx        Social History     Socioeconomic History    Marital status:      Spouse name: Not on file    Number of children: Not on file    Years of education: Not on file    Highest education level: Not on file   Social Needs    Financial resource strain: Not on file    Food insecurity - worry: Not on file    Food insecurity - inability: Not on file    Transportation needs - medical: Not on file    Transportation needs - non-medical: Not on file   Occupational History    Not on file   Tobacco Use    Smoking status: Never Smoker    Smokeless tobacco: Never Used   Substance and Sexual Activity    Alcohol use: Yes     Comment: socially, hardly ever    Drug use: No    Sexual activity: No     Birth control/protection: Abstinence   Other Topics Concern    Are you pregnant or think you may be? Not Asked    Breast-feeding Not Asked   Social History Narrative    , lives alone.  Primary support are her children and friends.       Current Outpatient Medications on File Prior to  Visit   Medication Sig Dispense Refill    atorvastatin (LIPITOR) 40 MG tablet Take 1 tablet (40 mg total) by mouth once daily. 90 tablet 1    betamethasone dipropionate (DIPROLENE) 0.05 % ointment Apply every Am x 2 weeks then twice weekly 45 g 3    blood sugar diagnostic Strp Dispense neil contour strips; test blood sugar once daily 100 strip 11    blood-glucose meter (CONTOUR METER) kit Please dispense POINT 3 Basketball Contour meter and supplies Use as instructed Test Blood sugar once daily 1 each 0    cephALEXin (KEFLEX) 250 MG capsule Take 1 capsule (250 mg total) by mouth once daily. 30 capsule 6    conjugated estrogens (PREMARIN) vaginal cream Place 0.5 g vaginally every evening. 30 g 11    fluticasone (FLONASE) 50 mcg/actuation nasal spray 2 sprays by Each Nare route once daily.  0    FREESTYLE EFREN READER Misc TEST as directed  0    FREESTYLE EFREN SENSOR Kit   0    gabapentin (NEURONTIN) 600 MG tablet Take 1,200 mg by mouth 2 (two) times daily.       hydrocortisone 2.5 % cream   0    lancets (ONE TOUCH ULTRASOFT LANCETS) Misc Test blood sugar once daily 200 each 11    levothyroxine (SYNTHROID) 100 MCG tablet TAKE 1 TABLET BY MOUTH EVERY MORNING 90 tablet 0    linagliptin (TRADJENTA) 5 mg Tab tablet Take 1 tablet (5 mg total) by mouth once daily. (Patient taking differently: Take 5 mg by mouth 2 (two) times daily. ) 90 tablet 3    magnesium 30 mg Tab Take 500 capsules by mouth. Patient's taking magnesium 500mg QD      meloxicam (MOBIC) 15 MG tablet TAKE 1 TABLET BY MOUTH DAILY WITH FOOD AS NEEDED FOR PAIN OR ARTHRITIS 90 tablet 0    metFORMIN (GLUCOPHAGE-XR) 750 MG 24 hr tablet Take 1 tablet (750 mg total) by mouth 2 (two) times daily with meals. 60 tablet 11    mirabegron 50 mg Tb24 Take 1 tablet (50 mg total) by mouth once daily. 30 tablet 11    ondansetron (ZOFRAN) 4 MG tablet Take 1 tablet (4 mg total) by mouth every 8 (eight) hours as needed for Nausea. 12 tablet 0    ospemifene (OSPHENA) 60  "mg Tab Take 60 mg by mouth once daily. 90 tablet 1    pantoprazole (PROTONIX) 40 MG tablet TAKE 1 TABLET BY MOUTH ONCE DAILY 90 tablet 0    polyethylene glycol (GLYCOLAX) 17 gram/dose powder Take 17 g by mouth once daily.  0    triamcinolone acetonide 0.1% (KENALOG) 0.1 % paste apply to affected area with A Q-TIP three times a day  0    VIT A/VIT C/VIT E/ZINC/COPPER (ICAPS AREDS ORAL) Take by mouth 2 (two) times daily.      vitamin D 1000 units Tab Take 185 mg by mouth once daily. D3      azelastine (ASTELIN) 137 mcg nasal spray 1 spray (137 mcg total) by Nasal route 2 (two) times daily. 30 mL 1    diphenhydrAMINE (BENADRYL) 25 mg capsule Take 1 each (25 mg total) by mouth nightly as needed for Itching, Allergies or Insomnia.  0    [DISCONTINUED] aspirin (ECOTRIN) 81 MG EC tablet Take 81 mg by mouth once daily.      [DISCONTINUED] insulin syringe-needle U-100 0.3 mL 31 gauge x 15/64" Syrg 1 Syringe by Misc.(Non-Drug; Combo Route) route once daily. 30 Syringe 3    [DISCONTINUED] ipratropium (ATROVENT) 0.06 % nasal spray   0    [DISCONTINUED] metronidazole 0.75% (METROCREAM) 0.75 % Crea   0    [DISCONTINUED] sucralfate (CARAFATE) 1 gram tablet TAKE 1 TABLET(1 GRAM) BY MOUTH FOUR TIMES DAILY 90 tablet 0     Current Facility-Administered Medications on File Prior to Visit   Medication Dose Route Frequency Provider Last Rate Last Dose    amoxicillin capsule 500 mg  500 mg Oral Once Viridiana Valenzuela MD           Review of patient's allergies indicates:   Allergen Reactions    Codeine Nausea And Vomiting     Other reaction(s): Itching    Percocet  [oxycodone-acetaminophen]      Other reaction(s): Itching    Sulfa (sulfonamide antibiotics)      Other reaction(s): Nausea  Other reaction(s): Itching       Physical Exam:  General: Alert and Oriented x3, no distress, well dressed and groomed, pleasant and friendly.  Vitals:    02/28/19 1300   BP: 107/65   Pulse: 62     HEENT: Normocephalic, Atraumatic. No " scleral icterus.  Lymph: No cervical lymphadenopathy  Resp: Good air entry bilaterally, no adventitious sounds.  Cardiac: S1 and S2 normal.  Abdomen: Normoactive bowel sounds. Non-distended. Normal tympany. Soft. Non-tender. No peritoneal signs. Prior well healed abdominal incisions.  Extremities: No peripheral edema. Normal bilateral pedal and radial pulses.  Neurologic: No gross neurological Deficits  Psych: Calm, cooperative. Normal mood and affect.    Laboratory:  Lab Results   Component Value Date     (L) 02/04/2019    K 4.8 02/04/2019     02/04/2019    CO2 21 (L) 02/04/2019    BUN 12 02/04/2019    CREATININE 0.9 02/04/2019    CALCIUM 10.0 02/04/2019    ANIONGAP 12 02/04/2019    ESTGFRAFRICA >60.0 02/04/2019    EGFRNONAA >60.0 02/04/2019       Lab Results   Component Value Date    ALT 28 02/04/2019    AST 28 02/04/2019    ALKPHOS 60 02/04/2019    BILITOT 0.9 02/04/2019       Lab Results   Component Value Date    WBC 7.47 02/04/2019    HGB 10.8 (L) 02/04/2019    HCT 35.1 (L) 02/04/2019    MCV 83 02/04/2019     (L) 02/04/2019       Microbiology:  No Pertinent Microbiology    Imaging:  -CT AP (2/5)  Postsurgical changes of partial colectomy with multiple small bowel resections. No evidence of radiopaque foreign body within the visualized bowel, noting slightly limited examination with enteral contrast predominantly located within the colon.  - multiple simple renal cysts    Assessment:  Anayeli Judd is a 74 y.o. female who is presenting for follow-up evaluation of abdominal pain.    Her abdominal pain etiology remains unclear. Given her multiple prior surgeries for CRC and SBO, abdominal pain could be secondary to partial small bowel obstruction (given prior SBO and concurrent history of constipation with abdominal pain), IBS-D, SIBO (had brief improvement previously with trial of rifaximin). Likely the imaging concerning for foreign body in intestine was not related as this was a new  finding.    Plan:  1. Repeat CT scan non-contrast to ensure foreign body has left the intestines. I discussed with her that if she were to present to the ED in the interim and was given contrast she should defer this study and call us. Discussed that in future if she should have food become stuck in esophagus she should present for evaluation.  2. Prior labwork was concerning for elevated chromograninA and somatostatin. Currently undergoing endocrinology evaluation.  3. 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 Seen by vascular surgery who feel not clinically significant for mesenteric ischemia  4. EUS evaluated pancreas given mild fatty involution of pancreatic head on CT (11/14) which was negative  5. Refer to dietician for trial of low FODMAP diet  6. CRC Screening: colonoscopy 2020. She has had a prior negative Torres genetic evaluation (2007). All of her children have started screening for CRC and follow with GI physicians.  7. Follow-up if symptoms worsen or fail to improve.    Guerrero Betts MD  Gastroenterology Fellow (PGY IV)  Phone: [phone]    Orders Placed This Encounter   Procedures    CT Abdomen Pelvis  Without Contrast

## 2019-02-28 NOTE — PATIENT INSTRUCTIONS
- CT scan to ensure the prior foreign body (?bone) has left your intestine  - dietician appointment for trial of low FODMAP diet

## 2019-02-28 NOTE — TELEPHONE ENCOUNTER
----- Message from Lencho Rodriguez sent at 2/28/2019  3:04 PM CST -----  Contact: pt   Pt would like a call back regarding ct scan stated that it should be w/out contrast and her order shows wit contrast please call to advise     Pt can be reached at [phone]

## 2019-02-28 NOTE — TELEPHONE ENCOUNTER
----- Message from Lencho Rodriguez sent at 2/28/2019  3:04 PM CST -----  Contact: pt   Pt would like a call back regarding ct scan stated that it should be w/out contrast and her order shows wit contrast please call to advise     Pt can be reached at [phone]

## 2019-03-04 ENCOUNTER — HOSPITAL ENCOUNTER (OUTPATIENT)
Dept: RADIOLOGY | Facility: HOSPITAL | Age: 74
Discharge: HOME | End: 2019-03-04
Attending: INTERNAL MEDICINE
Payer: MEDICARE

## 2019-03-04 DIAGNOSIS — T18.9XXD FOREIGN BODY ALIMENTARY TRACT, SUBSEQUENT ENCOUNTER: ICD-10-CM

## 2019-03-04 PROCEDURE — 74176 CT ABD & PELVIS W/O CONTRAST: CPT | Mod: TC

## 2019-03-04 PROCEDURE — 74176 CT ABDOMEN PELVIS WITHOUT CONTRAST: ICD-10-PCS | Mod: 26,,, | Performed by: RADIOLOGY

## 2019-03-04 PROCEDURE — 74176 CT ABD & PELVIS W/O CONTRAST: CPT | Mod: 26,,, | Performed by: RADIOLOGY

## 2019-03-07 ENCOUNTER — TELEPHONE (OUTPATIENT)
Dept: GASTROENTEROLOGY | Facility: CLINIC | Age: 74
End: 2019-03-07

## 2019-03-07 NOTE — TELEPHONE ENCOUNTER
----- Message from Guerrero Betts MD sent at 3/4/2019  5:22 PM CST -----  Hi,    Please let Ms Judd know that her CT scan looks good. No evidence of the bone that was present previously. Otherwise only notable for constipation, as below:    - Stable postoperative changes of multiple small and large bowel resections.  Abundant feces in the colon.  - Multiple hyperdensities in the stomach most likely ingested tablets.  - Incidental findings include cholecystectomy, right renal hypodensity, atherosclerosis and anterolisthesis of L5 on S1.    Thanks,  Dr Betts

## 2019-03-09 ENCOUNTER — HOSPITAL ENCOUNTER (EMERGENCY)
Facility: HOSPITAL | Age: 74
Discharge: HOME | End: 2019-03-10
Attending: EMERGENCY MEDICINE
Payer: MEDICARE

## 2019-03-09 VITALS
HEIGHT: 62 IN | WEIGHT: 138 LBS | RESPIRATION RATE: 20 BRPM | HEART RATE: 114 BPM | BODY MASS INDEX: 25.4 KG/M2 | OXYGEN SATURATION: 99 % | SYSTOLIC BLOOD PRESSURE: 163 MMHG | TEMPERATURE: 98 F | DIASTOLIC BLOOD PRESSURE: 81 MMHG

## 2019-03-09 DIAGNOSIS — R10.9 ABDOMINAL PAIN: ICD-10-CM

## 2019-03-09 DIAGNOSIS — K59.00 CONSTIPATION, UNSPECIFIED CONSTIPATION TYPE: Primary | ICD-10-CM

## 2019-03-09 DIAGNOSIS — E87.1 HYPONATREMIA: ICD-10-CM

## 2019-03-09 PROCEDURE — 99284 PR EMERGENCY DEPT VISIT,LEVEL IV: ICD-10-PCS | Mod: ,,, | Performed by: EMERGENCY MEDICINE

## 2019-03-09 PROCEDURE — 99284 EMERGENCY DEPT VISIT MOD MDM: CPT | Mod: ,,, | Performed by: EMERGENCY MEDICINE

## 2019-03-09 PROCEDURE — 99283 EMERGENCY DEPT VISIT LOW MDM: CPT

## 2019-03-10 LAB
ALBUMIN SERPL BCP-MCNC: 3.7 G/DL
ALP SERPL-CCNC: 52 U/L
ALT SERPL W/O P-5'-P-CCNC: 32 U/L
ANION GAP SERPL CALC-SCNC: 11 MMOL/L
AST SERPL-CCNC: 47 U/L
BASOPHILS # BLD AUTO: 0.05 K/UL
BASOPHILS NFR BLD: 0.5 %
BILIRUB SERPL-MCNC: 0.9 MG/DL
BUN SERPL-MCNC: 15 MG/DL
CALCIUM SERPL-MCNC: 9.5 MG/DL
CHLORIDE SERPL-SCNC: 100 MMOL/L
CO2 SERPL-SCNC: 23 MMOL/L
CREAT SERPL-MCNC: 0.8 MG/DL
DIFFERENTIAL METHOD: ABNORMAL
EOSINOPHIL # BLD AUTO: 0.2 K/UL
EOSINOPHIL NFR BLD: 1.6 %
ERYTHROCYTE [DISTWIDTH] IN BLOOD BY AUTOMATED COUNT: 16.4 %
EST. GFR  (AFRICAN AMERICAN): >60 ML/MIN/1.73 M^2
EST. GFR  (NON AFRICAN AMERICAN): >60 ML/MIN/1.73 M^2
GLUCOSE SERPL-MCNC: 141 MG/DL
HCT VFR BLD AUTO: 31.6 %
HGB BLD-MCNC: 10.2 G/DL
IMM GRANULOCYTES # BLD AUTO: 0.04 K/UL
IMM GRANULOCYTES NFR BLD AUTO: 0.4 %
LYMPHOCYTES # BLD AUTO: 1.3 K/UL
LYMPHOCYTES NFR BLD: 12.3 %
MCH RBC QN AUTO: 25.9 PG
MCHC RBC AUTO-ENTMCNC: 32.3 G/DL
MCV RBC AUTO: 80 FL
MONOCYTES # BLD AUTO: 0.9 K/UL
MONOCYTES NFR BLD: 8.5 %
NEUTROPHILS # BLD AUTO: 8.1 K/UL
NEUTROPHILS NFR BLD: 76.7 %
NRBC BLD-RTO: 0 /100 WBC
PLATELET # BLD AUTO: 101 K/UL
PMV BLD AUTO: 10.7 FL
POTASSIUM SERPL-SCNC: 4.2 MMOL/L
PROT SERPL-MCNC: 6.9 G/DL
RBC # BLD AUTO: 3.94 M/UL
SODIUM SERPL-SCNC: 134 MMOL/L
WBC # BLD AUTO: 10.58 K/UL

## 2019-03-10 PROCEDURE — 85025 COMPLETE CBC W/AUTO DIFF WBC: CPT

## 2019-03-10 PROCEDURE — 80053 COMPREHEN METABOLIC PANEL: CPT

## 2019-03-10 RX ORDER — PSEUDOEPHEDRINE/ACETAMINOPHEN 30MG-500MG
100 TABLET ORAL
Status: DISCONTINUED | OUTPATIENT
Start: 2019-03-10 | End: 2019-03-10 | Stop reason: HOSPADM

## 2019-03-10 RX ORDER — SYRING-NEEDL,DISP,INSUL,0.3 ML 29 G X1/2"
296 SYRINGE, EMPTY DISPOSABLE MISCELLANEOUS
Status: DISCONTINUED | OUTPATIENT
Start: 2019-03-10 | End: 2019-03-10 | Stop reason: HOSPADM

## 2019-03-10 NOTE — ED NOTES
Pt w/ hx of colon cancer reports to ED with reports of chronic constipation that has become worse within the past week. Pt reports having a CT Monday which showed extensive amounts of stool. Pt states that the past two days she has taken one bottle of magnesium citrate and a large enema, and has still only passed a very minimal amount of stool. Pt states that normal her bowel movements are very small. Pt denies melena, but states that she has had abdominal cramping, nausea and vomiting x3.

## 2019-03-10 NOTE — ED PROVIDER NOTES
Encounter Date: 3/9/2019       History     Chief Complaint   Patient presents with    Constipation     Pt to ER c/o constipation. She tried mag citrate and enemas today with no relief. She is now nauseous. She has a hx of colon cancer and blockages. She sees GI regularly.      The patient, who has a past medical history significant for DM, Colorectal cancer s/o hemicolectomy, chronic constipation, and SBO, presents to the ER for an emergent evaluation due to diffuse abdominal pain and constipation. She indicates that the pain is periumbilical. She states that the degree of pain is mild to moderate. She states that the pain course is waxing and waning. She states that her last normal BM was more than a week ago. She reports having associated nausea. She states that this is an ongoing problem. She had a CT scan of her abdomen earlier this week that showed substantial constipation. She states that she has tried taking Magnesium Citrate, Colace, and Dulcolax at home yesterday and today, but denies any improvement. She states that she came to the ER to make sure she does not have a bowel obstruction again.           Review of patient's allergies indicates:   Allergen Reactions    Codeine Nausea And Vomiting     Other reaction(s): Itching    Percocet  [oxycodone-acetaminophen]      Other reaction(s): Itching    Sulfa (sulfonamide antibiotics)      Other reaction(s): Nausea  Other reaction(s): Itching     Past Medical History:   Diagnosis Date    Abdominal pain     Allergic rhinitis     Arthritis     Blood transfusion     during delivery and     Bowel obstruction     Cervical radiculopathy     followed by dr cloud    Colon cancer     transverse colon; resected; Stage IIA (pT3 pN0 MX)    Diarrhea     Family history of breast cancer     Family history of colon cancer     Fatty liver     GERD (gastroesophageal reflux disease)     History of shingles     Hyperlipidemia     Hypothyroidism      Irritable bowel syndrome     Microscopic colitis     treated     Raynaud phenomenon     Raynaud's disease     Type 2 diabetes mellitus      Past Surgical History:   Procedure Laterality Date    APPENDECTOMY      BACK SURGERY      CARPAL TUNNEL RELEASE      bilateral      SECTION      CHOLECYSTECTOMY  1965    COLECTOMY  2011    Transverse colon resection by Dr. Aguirre    COLONOSCOPY N/A 2017    Performed by [facility] at [facility] (4TH FLR)    COLONOSCOPY N/A 2013    Performed by Angelo Reynolds [facility] at [facility] (4TH FLR)    EGD (ESOPHAGOGASTRODUODENOSCOPY) N/A 2018    Performed by [facility]o [facility] at [facility] END[facility] (2ND FLR)    EXPLORATORY-LAPAROTOMY N/A 2017    Performed by Herman Fletcher [facility] at [facility] OR 2ND FLR    EXPLORATORY-LAPAROTOMY N/A 2014    Performed by Gonzalez [facility]vestre [facility] at [facility] OR 2ND FLR    EYE SURGERY      Cataract Removal    HYSTERECTOMY      LYSIS-ADHESION N/A 2017    [facility]formed by [facility] OR 2ND FLR    LYSIS-ADHESION N/A 2014    [facility]formed by [facility] OR 2ND FLR    PLACEMENT  2017    Performed by Herman [facility] at [facility] OR 2ND FLR    posterolateral fusion with autograft bone and [place] mineralized bone matrix  13    at [facility] for lumbar spine stenosis    REPAIR  2017    [facility]formed by Herman [facility] OR 2ND FLR    RESECTION - SMALL BOWEL N/A 2014    Per[facility] by [facility] 2ND FLR    TOE SURGERY      TONSILLECTOMY      TRIGGER FINGER RELEASE      ULTRASOUND, UPPER GI TRACT, ENDOSCOPIC N/A 2019    Performed by Jose Hess MD at [facility] ENDO (2ND FLR)    ULTRASOUND, UPPER GI TRACT, ENDOSCOPIC N/A 2018    [facility] by Jose [facility] at [facility]     Family History   Problem Relation Age of Onset    Heart disease Father 50        Mi age 50    Colon cancer Father     Bladder Cancer Mother          non smoker    Cataracts Mother     Glaucoma Mother     Heart disease Mother     Hyperlipidemia Mother     Kidney disease Mother     Breast cancer Sister 79    Arthritis Daughter     Asthma Daughter     Depression Daughter     Hypertension Daughter     Stroke Daughter 40    Arthritis Brother     Colon cancer Brother 70    Alcohol abuse Brother     Parkinsonism Brother     Alcohol abuse Brother     Depression Daughter     Celiac disease Neg Hx     Cirrhosis Neg Hx     Colon polyps Neg Hx     Crohn's disease Neg Hx     Cystic fibrosis Neg Hx     Esophageal cancer Neg Hx     Hemochromatosis Neg Hx     Inflammatory bowel disease Neg Hx     Irritable bowel syndrome Neg Hx     Liver cancer Neg Hx     Liver disease Neg Hx     Rectal cancer Neg Hx     Stomach cancer Neg Hx     Ulcerative colitis Neg Hx     Eliazar's disease Neg Hx     Amblyopia Neg Hx     Blindness Neg Hx     Macular degeneration Neg Hx     Retinal detachment Neg Hx     Strabismus Neg Hx     Melanoma Neg Hx      Social History     Tobacco Use    Smoking status: Never Smoker    Smokeless tobacco: Never Used   Substance Use Topics    Alcohol use: Yes     Comment: socially, hardly ever    Drug use: No     Review of Systems   Constitutional: Negative for chills and fever.   HENT: Negative for sore throat.    Respiratory: Negative for cough and shortness of breath.    Cardiovascular: Negative for chest pain.   Gastrointestinal: Positive for abdominal pain, constipation and nausea. Negative for abdominal distention, blood in stool, diarrhea, rectal pain and vomiting.   Genitourinary: Negative for decreased urine volume, dysuria, flank pain and frequency.   Musculoskeletal: Negative for arthralgias, back pain and gait problem.   Skin: Negative for color change and rash.   Neurological: Negative for dizziness, syncope, weakness, light-headedness and headaches.   Psychiatric/Behavioral: Negative for confusion.        Physical Exam     Initial Vitals [03/09/19 2155]   BP Pulse Resp Temp SpO2   (!) 163/81 (!) 114 20 98 °F (36.7 °C) 99 %      MAP       --         Physical Exam    Nursing note and vitals reviewed.  Constitutional: She appears well-developed and well-nourished. She is not diaphoretic.   HENT:   Head: Normocephalic.   Mouth/Throat: Oropharynx is clear and moist.   Eyes: Conjunctivae are normal. No scleral icterus.   Cardiovascular: Normal rate, regular rhythm and intact distal pulses.   Pulmonary/Chest: Breath sounds normal. No respiratory distress.   Abdominal: Soft. She exhibits no distension. There is no rebound and no guarding.   Benign abdomen. No peritoneal signs.    Genitourinary:   Genitourinary Comments: Female RN present throughout entire exam as a chaperone. ARMAND: non-tender to exam; no abscess or hemorrhoid; normal tone; no stool in rectal vault; no melena or hematochezia noted.    Musculoskeletal: Normal range of motion.   Neurological: She is alert and oriented to person, place, and time.   Skin: Skin is warm and dry.   Psychiatric: She has a normal mood and affect.         ED Course   Procedures  Labs Reviewed   CBC W/ AUTO DIFFERENTIAL - Abnormal; Notable for the following components:       Result Value    RBC 3.94 (*)     Hemoglobin 10.2 (*)     Hematocrit 31.6 (*)     MCV 80 (*)     MCH 25.9 (*)     RDW 16.4 (*)     Platelets 101 (*)     Gran # (ANC) 8.1 (*)     Gran% 76.7 (*)     Lymph% 12.3 (*)     All other components within normal limits   COMPREHENSIVE METABOLIC PANEL - Abnormal; Notable for the following components:    Sodium 134 (*)     Glucose 141 (*)     Alkaline Phosphatase 52 (*)     AST 47 (*)     All other components within normal limits   URINALYSIS, REFLEX TO URINE CULTURE     Results for orders placed or performed during the hospital encounter of 03/09/19   CBC auto differential   Result Value Ref Range    WBC 10.58 3.90 - 12.70 K/uL    RBC 3.94 (L) 4.00 - 5.40 M/uL    Hemoglobin  "10.2 (L) 12.0 - 16.0 g/dL    Hematocrit 31.6 (L) 37.0 - 48.5 %    MCV 80 (L) 82 - 98 fL    MCH 25.9 (L) 27.0 - 31.0 pg    MCHC 32.3 32.0 - 36.0 g/dL    RDW 16.4 (H) 11.5 - 14.5 %    Platelets 101 (L) 150 - 350 K/uL    MPV 10.7 9.2 - 12.9 fL    Immature Granulocytes 0.4 0.0 - 0.5 %    Gran # (ANC) 8.1 (H) 1.8 - 7.7 K/uL    Immature Grans (Abs) 0.04 0.00 - 0.04 K/uL    Lymph # 1.3 1.0 - 4.8 K/uL    Mono # 0.9 0.3 - 1.0 K/uL    Eos # 0.2 0.0 - 0.5 K/uL    Baso # 0.05 0.00 - 0.20 K/uL    nRBC 0 0 /100 WBC    Gran% 76.7 (H) 38.0 - 73.0 %    Lymph% 12.3 (L) 18.0 - 48.0 %    Mono% 8.5 4.0 - 15.0 %    Eosinophil% 1.6 0.0 - 8.0 %    Basophil% 0.5 0.0 - 1.9 %    Differential Method Automated    Comprehensive metabolic panel   Result Value Ref Range    Sodium 134 (L) 136 - 145 mmol/L    Potassium 4.2 3.5 - 5.1 mmol/L    Chloride 100 95 - 110 mmol/L    CO2 23 23 - 29 mmol/L    Glucose 141 (H) 70 - 110 mg/dL    BUN, Bld 15 8 - 23 mg/dL    Creatinine 0.8 0.5 - 1.4 mg/dL    Calcium 9.5 8.7 - 10.5 mg/dL    Total Protein 6.9 6.0 - 8.4 g/dL    Albumin 3.7 3.5 - 5.2 g/dL    Total Bilirubin 0.9 0.1 - 1.0 mg/dL    Alkaline Phosphatase 52 (L) 55 - 135 U/L    AST 47 (H) 10 - 40 U/L    ALT 32 10 - 44 U/L    Anion Gap 11 8 - 16 mmol/L    eGFR if African American >60.0 >60 mL/min/1.73 m^2    eGFR if non African American >60.0 >60 mL/min/1.73 m^2             Vitals:    03/09/19 2155   BP: (!) 163/81   Pulse: (!) 114   Resp: 20   Temp: 98 °F (36.7 °C)   TempSrc: Oral   SpO2: 99%   Weight: 62.6 kg (138 lb)   Height: 5' 2" (1.575 m)       Imaging Results          X-Ray Abdomen Flat And Erect (Final result)  Result time 03/10/19 01:27:42    Final result by Herman Flowers MD (03/10/19 01:27:42)                 Impression:      Nonobstructive bowel gas pattern.      Electronically signed by: Herman Flowers MD  Date:    03/10/2019  Time:    01:27             Narrative:    EXAMINATION:  XR ABDOMEN FLAT AND ERECT    CLINICAL HISTORY:  Unspecified " abdominal pain.    TECHNIQUE:  Flat and erect frontal views of the abdomen were performed.    COMPARISON:  11/15/2018.    FINDINGS:  Mild gaseous distention of bowel, overall nonobstructive pattern.  Suture material noted from prior bowel resections.  No evidence of pneumoperitoneum.  No large volume fecal burden.  Vaginal pessary device is noted.  No pathologic calcifications.  Bones are stable.                                 Medical Decision Making:   History:   Old Medical Records: I decided to obtain old medical records.  Initial Assessment:   Pt with Hx of DM, CRC s/p hemicolectomy, chronic constipation, and SBO presents to the ER c/o periumbilical pain and constipation > 1 week   Differential Diagnosis:   Constipation, SBO, fecal impaction, GI FB, gastroparesis, Electrolyte abnormality, UTI, GI bleeding, etc   Clinical Tests:   Lab Tests: Ordered and Reviewed  Radiological Study: Ordered and Reviewed  ED Management:  NS ordered due to mild hyponatremia  Abdominal films unremarkable   I discussed the case in detail with the ER attending physician, who requests brown bomb now   Advised the patient of test results, diagnosis, and treatment plan   Advised close follow up with PCP and GI   Advised prompt return to the ER if worse in any way or if unimproved      Additional MDM:   X-Rays: Chest X-Ray - Independent Interpretation - Heart size normal, Lungs clear, No acute abnormality.                    Clinical Impression:       ICD-10-CM ICD-9-CM   1. Constipation, unspecified constipation type K59.00 564.00   2. Abdominal pain R10.9 789.00   3. Hyponatremia E87.1 276.1         Disposition:   Disposition: Discharged  Condition: Stable                        Mohit De La Torre PA-C  03/10/19 0338       Mohit De La Torre PA-C  03/10/19 0340

## 2019-03-18 ENCOUNTER — NUTRITION (OUTPATIENT)
Dept: GASTROENTEROLOGY | Facility: CLINIC | Age: 74
End: 2019-03-18

## 2019-03-18 VITALS — WEIGHT: 141.13 LBS | HEIGHT: 64 IN | BODY MASS INDEX: 24.1 KG/M2

## 2019-03-18 DIAGNOSIS — E11.9 TYPE 2 DIABETES MELLITUS WITHOUT COMPLICATION, WITHOUT LONG-TERM CURRENT USE OF INSULIN: ICD-10-CM

## 2019-03-18 DIAGNOSIS — K56.600 PARTIAL SMALL BOWEL OBSTRUCTION: ICD-10-CM

## 2019-03-18 DIAGNOSIS — K52.9 CHRONIC DIARRHEA: ICD-10-CM

## 2019-03-18 DIAGNOSIS — R15.9 FULL INCONTINENCE OF FECES: ICD-10-CM

## 2019-03-18 DIAGNOSIS — K58.2 IRRITABLE BOWEL SYNDROME WITH BOTH CONSTIPATION AND DIARRHEA: Primary | ICD-10-CM

## 2019-03-18 PROCEDURE — 99999 PR PBB SHADOW E&M-EST. PATIENT-LVL I: ICD-10-PCS | Mod: PBBFAC,,,

## 2019-03-18 PROCEDURE — 99999 PR PBB SHADOW E&M-EST. PATIENT-LVL I: CPT | Mod: PBBFAC,,,

## 2019-03-18 NOTE — PROGRESS NOTES
NUTRITIONAL ASSESSMENT    Referring Provider: Dr Betts/Li      Reason for Visit: Low Fodmap diet coordinated with DM guidelines and short bowel guidelines ( small bowel resection  ) and colectomy     Hx IBS, fecal soiling with no apparent pattern in cause/effect with oral diet : tolerates salads without fecal urgency ; tolerates low fiber food ( rice, grits,white bread) with decrease in fecal urgency ; hx of frequent periods of dehydration ; does use electrolyte replacements ( G2 ) on occasion .     Hx : history of CRC, multiple admissions for SBO, IBS, microscopic colitis  Bowel movements,  5-6/day, generally starting formed in the morning and progressively more loose; pattern  for several years. She notes unpredictable  fecal incontinence which has been ongoing for years as well.  prior workup negative defecography and has attempted other treatments including PT without improvement. Dr. Erazo ([facility])briefly considered for interstim but appears to have been not further considered as incontinence improved with fiber ( fiber con in am and Metamucil in evening) .  Age: 74 y.o.  Sex: female     Past Surgical History:   Procedure Laterality Date    APPENDECTOMY        BACK SURGERY        CARPAL TUNNEL RELEASE         bilateral      SECTION        CHOLECYSTECTOMY   1965    COLECTOMY   2011     Transverse colon resection by Dr. Aguirre    COLONOSCOPY N/A 2017     Performed by [facility]jit Alvarez [facility] at [facility] (4TH FLR)    COLONOSCOPY N/A 2013     Performed by Angelo Reynolds MD at [facility] (4TH FLR)    EGD (ESOPHAGOGASTRODUODENOSCOPY) N/A 2018     Performed by Angelo Reynolds MD at [facility] (2ND FLR)    EXPLORATORY-LAPAROTOMY N/A 2017     Performed by Herman Fletcher [facility] at [facility] OR 2ND FLR    EXPLORATORY-LAPAROTOMY N/A 2014     Performed by Gonzalez Silvestre [facility] at [facility] OR 2ND FLR    EYE SURGERY         Cataract Removal    HYSTERECTOMY         LYSIS-ADHESION N/A 4/1/2017     Performed by Herman Fletcher MD at [facility] OR 2ND FLR    LYSIS-ADHESION N/A 9/2/2014     Performed by Gonzalez Silvestre MD at [facility] OR 2ND FLR    PLACEMENT   4/1/2017     Performed by Herman Fletcher MD at [facility] OR 2ND FLR    posterolateral fusion with autograft bone and [place] mineralized bone matrix   2/1/13     at [facility]    REPAIR   4/1/2017     Performed by Herman Fletcher MD at [facility] OR 2ND FLR    RESECTION - SMALL BOWEL N/A 9/2/2014     Performed by Gonzalez Silvestre MD at [facility] OR 2ND FLR    TOE SURGERY        TONSILLECTOMY        TRIGGER FINGER RELEASE        ULTRASOUND, UPPER GI TRACT, ENDOSCOPIC N/A 1/23/2019     Performed by Jose Hess MD at [facility] END[facility] (2ND FLR)    ULTRASOUND, UPPER GI TRACT, ENDOSCOPIC N/A 12/12/2018     Performed by Jose Hess MD at [facility]             Patient Active Problem List   Diagnosis    Lumbar spondylosis    Cervical spondylosis with radiculopathy    Insomnia    Carpal tunnel syndrome    Headache(784.0)    Hypothyroid    History of colon cancer    Irritable bowel syndrome    Chronic sinusitis of right maxilla    Diarrhea    Pelvic muscle wasting    GERD (gastroesophageal reflux disease)    Fatty liver    Partial small bowel obstruction    Family history of breast cancer    Numbness of face    Gait disturbance    Type 2 diabetes mellitus without complication, without long-term current use of insulin    Acute deep vein thrombosis (DVT) of upper extremity    Wound infection after surgery    Hematoma    Chronic diarrhea    Fecal incontinence    Abdominal pain    Nausea    Vitamin B12 deficiency    Vaginal atrophy    Incomplete emptying of bladder    Mixed stress and urge urinary incontinence    Vaginal irritation    Frequent UTI    Fecal soiling    Irregular bowel habits    Elevated serum creatinine    Hypomagnesemia    Hydronephrosis    Acute cystitis  without hematuria     Past Medical History:   Diagnosis Date    Abdominal pain     Allergic rhinitis     Arthritis     Blood transfusion     during delivery and     Bowel obstruction     Cervical radiculopathy     followed by dr cloud    Colon cancer     transverse colon; resected; Stage IIA (pT3 pN0 MX)    Diarrhea     Family history of breast cancer     Family history of colon cancer     Fatty liver     GERD (gastroesophageal reflux disease)     History of shingles     Hyperlipidemia     Hypothyroidism     Irritable bowel syndrome     Microscopic colitis     treated     Raynaud phenomenon     Raynaud's disease     Type 2 diabetes mellitus      Lab Results   Component Value Date     (H) 03/10/2019    K 4.2 03/10/2019    PHOS 3.0 2018    MG 1.8 2019    CHOL 115 (L) 2018    HDL 45 2018    TRIG 90 2018    ALBUMIN 3.7 03/10/2019    PREALBUMIN 13 (L) 2017    AMMONIA 41 10/15/2011    HGBA1C 6.1 (H) 2019    CALCIUM 9.5 03/10/2019     Other Pertinent Labs:   3/10/2019 12:57 AM - [facility], Soft Lab Interface     Component Value Flag Ref Range Units Status   WBC 10.58   3.90 - 12.70 K/uL Final   RBC 3.94 Abnormally low   L  4.00 - 5.40 M/uL Final   Hemoglobin 10.2 Abnormally low   L  12.0 - 16.0 g/dL Final   Hematocrit 31.6 Abnormally low   L  37.0 - 48.5 % Final   MCV 80 Abnormally low   L  82 - 98 fL Final   MCH 25.9 Abnormally low   L  27.0 - 31.0 pg Final   MCHC 32.3   32.0 - 36.0 g/dL Final   RDW 16.4 Abnormally high   H  11.5 - 14.5 % Final   Platelets 101 Abnormally low   L  150 - 350 K/uL Final   MPV 10.7   9.2 - 12.9 fL Final   Immature Granulocytes 0.4   0.0 - 0.5 % Final   Gran # (ANC) 8.1 Abnormally high   H  1.8 - 7.7 K/uL Final   Immature Grans (Abs) 0.04   0.00 - 0.04 K/uL Final   Comment:   Mild elevation in immature granulocytes is non specific and   can be seen in a variety of conditions including stress response,   acute  inflammation, trauma and pregnancy. Correlation with other   laboratory and clinical findings is essential.    Lymph # 1.3   1.0 - 4.8 K/uL Final   Mono # 0.9   0.3 - 1.0 K/uL Final   Eos # 0.2   0.0 - 0.5 K/uL Final   Baso # 0.05   0.00 - 0.20 K/uL Final   nRBC 0   0 /100 WBC Final   Gran% 76.7 Abnormally high   H  38.0 - 73.0 % Final   Lymph% 12.3 Abnormally low   L  18.0 - 48.0 % Final   Mono% 8.5   4.0 - 15.0 % Final   Eosinophil% 1.6   0.0 - 8.0 % Final   Basophil% 0.5   0.0 - 1.9 % Final   Differential Method Automated     Final            2/4/2019  1:56 AM - [facility], Soft Lab Interface     Component Value Flag Ref Range Units Status   Lipase 102 Abnormally high   H  4 - 60 U/L Final     2/1/2019  3:00 PM - [facility], Soft Lab Interface     Component Value Flag Ref Range Units Status   Vitamin B-12 615   210 - 950 pg/mL Final     2/1/2019  2:48 PM - [facility], Soft Lab Interface     Component Value Flag Ref Range Units Status   Magnesium 1.8   1.6 - 2.6 mg/dL Final         Current Outpatient Medications   Medication Sig    atorvastatin (LIPITOR) 40 MG tablet Take 1 tablet (40 mg total) by mouth once daily.    azelastine (ASTELIN) 137 mcg nasal spray 1 spray (137 mcg total) by Nasal route 2 (two) times daily.    betamethasone dipropionate (DIPROLENE) 0.05 % ointment Apply every Am x 2 weeks then twice weekly    blood sugar diagnostic Strp Dispense madie contour strips; test blood sugar once daily    blood-glucose meter (CONTOUR METER) kit Please dispense Madie Contour meter and supplies Use as instructed Test Blood sugar once daily    cephALEXin (KEFLEX) 250 MG capsule Take 1 capsule (250 mg total) by mouth once daily.    conjugated estrogens (PREMARIN) vaginal cream Place 0.5 g vaginally every evening.    diphenhydrAMINE (BENADRYL) 25 mg capsule Take 1 each (25 mg total) by mouth nightly as needed for Itching, Allergies or Insomnia.    fluticasone (FLONASE) 50 mcg/actuation nasal spray 2 sprays by Each Nare route  "once daily.    FREESTYLE EFREN READER Misc TEST as directed    FREESTYLE EFREN SENSOR Kit     gabapentin (NEURONTIN) 600 MG tablet Take 1,200 mg by mouth 2 (two) times daily.     hydrocortisone 2.5 % cream     lancets (ONE TOUCH ULTRASOFT LANCETS) Misc Test blood sugar once daily    levothyroxine (SYNTHROID) 100 MCG tablet TAKE 1 TABLET BY MOUTH EVERY MORNING    linagliptin (TRADJENTA) 5 mg Tab tablet Take 1 tablet (5 mg total) by mouth once daily. (Patient taking differently: Take 5 mg by mouth 2 (two) times daily. )    magnesium 30 mg Tab Take 500 capsules by mouth. Patient's taking magnesium 500mg QD    meloxicam (MOBIC) 15 MG tablet TAKE 1 TABLET BY MOUTH DAILY WITH FOOD AS NEEDED FOR PAIN OR ARTHRITIS    metFORMIN (GLUCOPHAGE-XR) 750 MG 24 hr tablet Take 1 tablet (750 mg total) by mouth 2 (two) times daily with meals.    mirabegron 50 mg Tb24 Take 1 tablet (50 mg total) by mouth once daily.    ondansetron (ZOFRAN) 4 MG tablet Take 1 tablet (4 mg total) by mouth every 8 (eight) hours as needed for Nausea.    ospemifene (OSPHENA) 60 mg Tab Take 60 mg by mouth once daily.    pantoprazole (PROTONIX) 40 MG tablet TAKE 1 TABLET BY MOUTH ONCE DAILY    polyethylene glycol (GLYCOLAX) 17 gram/dose powder Take 17 g by mouth once daily.    triamcinolone acetonide 0.1% (KENALOG) 0.1 % paste apply to affected area with A Q-TIP three times a day    VIT A/VIT C/VIT E/ZINC/COPPER (ICAPS AREDS ORAL) Take by mouth 2 (two) times daily.    vitamin D 1000 units Tab Take 185 mg by mouth once daily. D3     Current Facility-Administered Medications   Medication    amoxicillin capsule 500 mg     Allergies: Codeine; Percocet  [oxycodone-acetaminophen]; and Sulfa (sulfonamide antibiotics)    Ht Readings from Last 1 Encounters:   03/18/19 5' 4" (1.626 m)     Wt Readings from Last 1 Encounters:   03/18/19 64 kg (141 lb 1.5 oz)      BMI: Body mass index is 24.22 kg/m².    Usual Weight: 170# at age 58 years   Weight " "Change/Time: decline of 145#> 140# over the past year   Vitals - 1 value per visit 11/15/2018 11/15/2018 11/19/2018 11/21/2018   SYSTOLIC 115  104    DIASTOLIC 64  54    PULSE 75  58    TEMPERATURE   98    RESPIRATIONS   18    SPO2   96    Weight (lb)  145  142.86   Weight (kg)  65.772  64.8   HEIGHT  5' 2"  5' 2"   BODY MASS INDEX  26.52  26.13   VISIT REPORT       Pain Score     0     Vitals - 1 value per visit 11/26/2018 11/26/2018 12/5/2018 12/12/2018   SYSTOLIC 112 110 121 112   DIASTOLIC 64 56 75 62   PULSE 66 69 64 68   TEMPERATURE 97.6 98.1  97.4   RESPIRATIONS  18  18   SPO2    100   Weight (lb) 143 143.74 144.62 140   Weight (kg) 64.864 65.2 65.6 63.504   HEIGHT 5' 2" 5' 2" 5' 2" 5' 2"   BODY MASS INDEX 26.16 26.29 26.45 25.61   VISIT REPORT       Pain Score   0 2      Vitals - 1 value per visit 12/14/2018 1/23/2019 2/3/2019 2/4/2019 2/5/2019   SYSTOLIC 124 115  109 108   DIASTOLIC 71 63  53 68   PULSE 62 73  63 66   TEMPERATURE 97.9 97.5  97.6 97.9   RESPIRATIONS  20  16    SPO2  100  100    Weight (lb) 142.2 143 143  141   Weight (kg) 64.5 64.864 64.864  63.957   HEIGHT  5' 2" 5' 2"  5' 2"   BODY MASS INDEX 26.01 26.16 26.16  25.79   VISIT REPORT        Pain Score  6         Vitals - 1 value per visit 2/7/2019 2/11/2019 2/14/2019 2/20/2019 2/28/2019   SYSTOLIC 120 120 104 120 107   DIASTOLIC 60 59 72 60 65   PULSE  68 66  62   TEMPERATURE  98.2      RESPIRATIONS        SPO2        Weight (lb) 140.65 141.09 141.98 138.01 140.21   Weight (kg) 63.8 64 64.4 62.6 63.6   HEIGHT 5' 2" 5' 2" 5' 2" 5' 2" 5' 2"   BODY MASS INDEX 25.73 25.81 25.97 25.24 25.65   VISIT REPORT        Pain Score  0 4 0 0 0     Vitals - 1 value per visit 3/9/2019 3/18/2019   SYSTOLIC 163    DIASTOLIC 81    PULSE 114    TEMPERATURE 98    RESPIRATIONS 20    SPO2 99    Weight (lb) 138 141.09   Weight (kg) 62.596 64   HEIGHT 5' 2" 5' 4"   BODY MASS INDEX 25.24 24.22   VISIT REPORT     Pain Score        Current Diet:   10 am Spelt toast and " egg  Or oatmeal with walnuts, berries   2-3pm salad with meat or salad with chicken   4pm Grapes and strawberries   7-8 pm Cheese and raw vegetables or rice cake with fruit     Appetite/Current Intake: good   Exercise/Physical Activity: sedentary   Nutritional/Herbal Supplements: magnesium   Chewing/Swallowing Problems: none   Symptoms: fat intolerance ; fiber supplements and salads tolerated but high insoluble fiber foods ( strawberries, grapes, nuts ) trigger diarrhea ; processed cho - low fiber starches assist with firming of stool     Estimated Kcal Need: 1775-2260 calories ( MSJ X 1.2)   Estimated Protein Need: 50-60 grams     Nutritional History: Has always followed high fiber / whole grains to address DM and BG maintenance     Nutritional Diagnoses  Problem: altered GI function   Etiology: related to small bowel resection and colectomy   Symptoms: as evidenced by diarrhea     Educational Need? yes  Barriers: readiness to learn  Discussed with: patient  Interventions: Patient taught nutrition information regarding need for low fiber diet with additional fluid/electrolyte replacement post small bowel resection and colectomy -- discussed the role of soluble fiber ( firming of stool ) vs insoluble fiber ( increased transit ) ; separation of liquids form meals ; reduced fat and avoidance of high fodmap foods ; use of crock pot for cooking vegetables and making easier to digest discussed .   Goals/Recommendations:   Actions Taken: instruct/provide written information  [facility] GI nutrition ( Short Bowel syndrome; low Fodmap)   Patient and/or family comprehend instructions: yes  Outcome: Verbalizes understanding  Monitoring: trial of short bowel syndrome diet with fodmap modifications and tracking odf change in bowel patterns     Counseling Time: 90 minutes

## 2019-03-28 ENCOUNTER — TELEPHONE (OUTPATIENT)
Dept: HEMATOLOGY/ONCOLOGY | Facility: CLINIC | Age: 74
End: 2019-03-28

## 2019-03-28 NOTE — TELEPHONE ENCOUNTER
spoke with pt on today in regards to yearly f/u,  is aware July is not open just yet, pt voiced she understands and will wait for appointments.

## 2019-04-17 ENCOUNTER — TELEPHONE (OUTPATIENT)
Dept: HEMATOLOGY/ONCOLOGY | Facility: CLINIC | Age: 74
End: 2019-04-17

## 2019-04-17 NOTE — TELEPHONE ENCOUNTER
spoke with pt on today in regards to yearly f/u,  inform  July schedule is not open, pt voiced she understands, pt agrees to have appointments mailed.

## 2019-04-22 ENCOUNTER — TELEPHONE (OUTPATIENT)
Dept: ENDOSCOPY | Facility: HOSPITAL | Age: 74
End: 2019-04-22

## 2019-04-22 DIAGNOSIS — K25.7 CHRONIC GASTRIC ULCER WITHOUT HEMORRHAGE AND WITHOUT PERFORATION: Primary | ICD-10-CM

## 2019-04-30 ENCOUNTER — LAB VISIT (OUTPATIENT)
Dept: LAB | Facility: HOSPITAL | Age: 74
End: 2019-04-30
Attending: INTERNAL MEDICINE

## 2019-04-30 DIAGNOSIS — N39.0 URINARY TRACT INFECTION WITHOUT HEMATURIA, SITE UNSPECIFIED: ICD-10-CM

## 2019-04-30 PROCEDURE — 87086 URINE CULTURE/COLONY COUNT: CPT

## 2019-04-30 PROCEDURE — 87088 URINE BACTERIA CULTURE: CPT

## 2019-04-30 PROCEDURE — 87077 CULTURE AEROBIC IDENTIFY: CPT

## 2019-04-30 PROCEDURE — 87186 SC STD MICRODIL/AGAR DIL: CPT

## 2019-04-30 PROCEDURE — 81001 URINALYSIS AUTO W/SCOPE: CPT

## 2019-05-01 LAB
BACTERIA #/AREA URNS AUTO: ABNORMAL /HPF
BILIRUB UR QL STRIP: NEGATIVE
CLARITY UR REFRACT.AUTO: ABNORMAL
COLOR UR AUTO: YELLOW
GLUCOSE UR QL STRIP: NEGATIVE
HGB UR QL STRIP: NEGATIVE
HYALINE CASTS UR QL AUTO: 0 /LPF
KETONES UR QL STRIP: ABNORMAL
LEUKOCYTE ESTERASE UR QL STRIP: ABNORMAL
MICROSCOPIC COMMENT: ABNORMAL
NITRITE UR QL STRIP: NEGATIVE
PH UR STRIP: 5 [PH] (ref 5–8)
PROT UR QL STRIP: ABNORMAL
RBC #/AREA URNS AUTO: 2 /HPF (ref 0–4)
SP GR UR STRIP: 1.01 (ref 1–1.03)
SQUAMOUS #/AREA URNS AUTO: 18 /HPF
URN SPEC COLLECT METH UR: ABNORMAL
WBC #/AREA URNS AUTO: >100 /HPF (ref 0–5)
WBC CLUMPS UR QL AUTO: ABNORMAL

## 2019-05-03 LAB — BACTERIA UR CULT: NORMAL

## 2019-05-10 ENCOUNTER — HOSPITAL ENCOUNTER (EMERGENCY)
Facility: HOSPITAL | Age: 74
Discharge: HOME | End: 2019-05-10
Attending: EMERGENCY MEDICINE
Payer: MEDICARE

## 2019-05-10 ENCOUNTER — TELEPHONE (OUTPATIENT)
Dept: GASTROENTEROLOGY | Facility: CLINIC | Age: 74
End: 2019-05-10

## 2019-05-10 VITALS
SYSTOLIC BLOOD PRESSURE: 115 MMHG | DIASTOLIC BLOOD PRESSURE: 58 MMHG | OXYGEN SATURATION: 98 % | BODY MASS INDEX: 25.4 KG/M2 | RESPIRATION RATE: 17 BRPM | TEMPERATURE: 98 F | HEIGHT: 62 IN | WEIGHT: 138 LBS | HEART RATE: 66 BPM

## 2019-05-10 DIAGNOSIS — R10.9 ABDOMINAL PAIN, UNSPECIFIED ABDOMINAL LOCATION: Primary | ICD-10-CM

## 2019-05-10 DIAGNOSIS — K59.00 CONSTIPATION, UNSPECIFIED CONSTIPATION TYPE: ICD-10-CM

## 2019-05-10 LAB
ALBUMIN SERPL BCP-MCNC: 3.7 G/DL (ref 3.5–5.2)
ALP SERPL-CCNC: 55 U/L (ref 55–135)
ALT SERPL W/O P-5'-P-CCNC: 23 U/L (ref 10–44)
ANION GAP SERPL CALC-SCNC: 10 MMOL/L (ref 8–16)
AST SERPL-CCNC: 29 U/L (ref 10–40)
BASOPHILS # BLD AUTO: 0.03 K/UL (ref 0–0.2)
BASOPHILS NFR BLD: 0.4 % (ref 0–1.9)
BILIRUB SERPL-MCNC: 0.6 MG/DL (ref 0.1–1)
BILIRUB UR QL STRIP: NEGATIVE
BUN SERPL-MCNC: 18 MG/DL (ref 8–23)
CALCIUM SERPL-MCNC: 9.9 MG/DL (ref 8.7–10.5)
CHLORIDE SERPL-SCNC: 105 MMOL/L (ref 95–110)
CLARITY UR REFRACT.AUTO: CLEAR
CO2 SERPL-SCNC: 24 MMOL/L (ref 23–29)
COLOR UR AUTO: YELLOW
CREAT SERPL-MCNC: 0.9 MG/DL (ref 0.5–1.4)
DIFFERENTIAL METHOD: ABNORMAL
EOSINOPHIL # BLD AUTO: 0.2 K/UL (ref 0–0.5)
EOSINOPHIL NFR BLD: 3.3 % (ref 0–8)
ERYTHROCYTE [DISTWIDTH] IN BLOOD BY AUTOMATED COUNT: 16.2 % (ref 11.5–14.5)
EST. GFR  (AFRICAN AMERICAN): >60 ML/MIN/1.73 M^2
EST. GFR  (NON AFRICAN AMERICAN): >60 ML/MIN/1.73 M^2
GLUCOSE SERPL-MCNC: 120 MG/DL (ref 70–110)
GLUCOSE UR QL STRIP: NEGATIVE
HCT VFR BLD AUTO: 33.4 % (ref 37–48.5)
HGB BLD-MCNC: 10.2 G/DL (ref 12–16)
HGB UR QL STRIP: NEGATIVE
IMM GRANULOCYTES # BLD AUTO: 0.01 K/UL (ref 0–0.04)
IMM GRANULOCYTES NFR BLD AUTO: 0.1 % (ref 0–0.5)
KETONES UR QL STRIP: NEGATIVE
LACTATE SERPL-SCNC: 2.3 MMOL/L (ref 0.5–2.2)
LEUKOCYTE ESTERASE UR QL STRIP: NEGATIVE
LIPASE SERPL-CCNC: 38 U/L (ref 4–60)
LYMPHOCYTES # BLD AUTO: 1.8 K/UL (ref 1–4.8)
LYMPHOCYTES NFR BLD: 24.1 % (ref 18–48)
MCH RBC QN AUTO: 24.9 PG (ref 27–31)
MCHC RBC AUTO-ENTMCNC: 30.5 G/DL (ref 32–36)
MCV RBC AUTO: 82 FL (ref 82–98)
MICROSCOPIC COMMENT: NORMAL
MONOCYTES # BLD AUTO: 0.7 K/UL (ref 0.3–1)
MONOCYTES NFR BLD: 9.2 % (ref 4–15)
NEUTROPHILS # BLD AUTO: 4.6 K/UL (ref 1.8–7.7)
NEUTROPHILS NFR BLD: 62.9 % (ref 38–73)
NITRITE UR QL STRIP: NEGATIVE
NRBC BLD-RTO: 0 /100 WBC
PH UR STRIP: 6 [PH] (ref 5–8)
PLATELET # BLD AUTO: 110 K/UL (ref 150–350)
PMV BLD AUTO: 10.4 FL (ref 9.2–12.9)
POTASSIUM SERPL-SCNC: 4.5 MMOL/L (ref 3.5–5.1)
PROT SERPL-MCNC: 7.2 G/DL (ref 6–8.4)
PROT UR QL STRIP: NEGATIVE
RBC # BLD AUTO: 4.09 M/UL (ref 4–5.4)
RBC #/AREA URNS AUTO: 1 /HPF (ref 0–4)
SODIUM SERPL-SCNC: 139 MMOL/L (ref 136–145)
SP GR UR STRIP: 1.01 (ref 1–1.03)
SQUAMOUS #/AREA URNS AUTO: 1 /HPF
URN SPEC COLLECT METH UR: NORMAL
WBC # BLD AUTO: 7.25 K/UL (ref 3.9–12.7)
WBC #/AREA URNS AUTO: 0 /HPF (ref 0–5)

## 2019-05-10 PROCEDURE — 25500020 PHARM REV CODE 255: Performed by: EMERGENCY MEDICINE

## 2019-05-10 PROCEDURE — 81001 URINALYSIS AUTO W/SCOPE: CPT

## 2019-05-10 PROCEDURE — 80053 COMPREHEN METABOLIC PANEL: CPT

## 2019-05-10 PROCEDURE — 83690 ASSAY OF LIPASE: CPT

## 2019-05-10 PROCEDURE — 99285 PR EMERGENCY DEPT VISIT,LEVEL V: ICD-10-PCS | Mod: ,,, | Performed by: PHYSICIAN ASSISTANT

## 2019-05-10 PROCEDURE — 83605 ASSAY OF LACTIC ACID: CPT

## 2019-05-10 PROCEDURE — 99285 EMERGENCY DEPT VISIT HI MDM: CPT | Mod: ,,, | Performed by: PHYSICIAN ASSISTANT

## 2019-05-10 PROCEDURE — 96374 THER/PROPH/DIAG INJ IV PUSH: CPT

## 2019-05-10 PROCEDURE — 96375 TX/PRO/DX INJ NEW DRUG ADDON: CPT

## 2019-05-10 PROCEDURE — 99285 EMERGENCY DEPT VISIT HI MDM: CPT | Mod: 25

## 2019-05-10 PROCEDURE — 63600175 PHARM REV CODE 636 W HCPCS: Performed by: PHYSICIAN ASSISTANT

## 2019-05-10 PROCEDURE — 85025 COMPLETE CBC W/AUTO DIFF WBC: CPT

## 2019-05-10 PROCEDURE — 25000003 PHARM REV CODE 250: Performed by: PHYSICIAN ASSISTANT

## 2019-05-10 PROCEDURE — 96361 HYDRATE IV INFUSION ADD-ON: CPT

## 2019-05-10 RX ORDER — DOCUSATE SODIUM 100 MG/1
100 CAPSULE, LIQUID FILLED ORAL 2 TIMES DAILY
Qty: 60 CAPSULE | Refills: 0 | Status: SHIPPED | OUTPATIENT
Start: 2019-05-10 | End: 2021-10-08

## 2019-05-10 RX ORDER — MORPHINE SULFATE 2 MG/ML
2 INJECTION, SOLUTION INTRAMUSCULAR; INTRAVENOUS
Status: COMPLETED | OUTPATIENT
Start: 2019-05-10 | End: 2019-05-10

## 2019-05-10 RX ORDER — ONDANSETRON 2 MG/ML
4 INJECTION INTRAMUSCULAR; INTRAVENOUS
Status: COMPLETED | OUTPATIENT
Start: 2019-05-10 | End: 2019-05-10

## 2019-05-10 RX ORDER — ZOSTER VACCINE RECOMBINANT, ADJUVANTED 50 MCG/0.5
KIT INTRAMUSCULAR
COMMUNITY
Start: 2019-02-19 | End: 2019-07-08 | Stop reason: ALTCHOICE

## 2019-05-10 RX ORDER — POLYETHYLENE GLYCOL 3350 17 G/17G
17 POWDER, FOR SOLUTION ORAL DAILY
Qty: 235 G | Refills: 0 | Status: SHIPPED | OUTPATIENT
Start: 2019-05-10 | End: 2020-04-02 | Stop reason: SDUPTHER

## 2019-05-10 RX ADMIN — MORPHINE SULFATE 2 MG: 2 INJECTION, SOLUTION INTRAMUSCULAR; INTRAVENOUS at 07:05

## 2019-05-10 RX ADMIN — SODIUM CHLORIDE 500 ML: 0.9 INJECTION, SOLUTION INTRAVENOUS at 07:05

## 2019-05-10 RX ADMIN — ONDANSETRON 4 MG: 2 INJECTION INTRAMUSCULAR; INTRAVENOUS at 07:05

## 2019-05-10 RX ADMIN — IOHEXOL 75 ML: 350 INJECTION, SOLUTION INTRAVENOUS at 07:05

## 2019-05-10 NOTE — TELEPHONE ENCOUNTER
----- Message from Ann Razo sent at 5/10/2019  3:46 PM CDT -----  Contact: self [phone]  Alpesh    Needs Advice    Reason for call: has abdominal pain and bloating in the last two hours        Communication Preference:[phone]    Additional Information:

## 2019-05-10 NOTE — ED TRIAGE NOTES
C/o abdominal pain that began at 12 noon.    Had loose stool prior to that. Now having pain in abdomen. Ate and pain got worse. States she was fine yesterday . Taking pro biotics to help w/ her chronic  Diarrhea. Deneis blood in stool, dysuria. Had constipation and was given a brown bomb in ER and was released.

## 2019-05-10 NOTE — TELEPHONE ENCOUNTER
Dr. Betts,  Patient states she having abdominal   Pain scale 8  Hard abdomen,bloting  and SOB   Pt was advised to go to ED or urgent care for the SOB and abdominal pains since Dr. Betts was not in clinic today  Pt verbalized understanding and asked that a message still be sent to MD  Please advise

## 2019-05-10 NOTE — ED PROVIDER NOTES
"Encounter Date: 5/10/2019       History     Chief Complaint   Patient presents with    Abdominal Pain     74-year-old female with multiple comorbidities including DM 2, hypothyroidism, history of colon cancer s/p colectomy and chemotherapy, GERD, chronic diarrhea presents for abdominal pain since noon today.  She reports that she periumbilical pain which felt both cramping and sharp which is waxing and waning in intensity.  She denies any palliating or provoking factors, no association with food.  She reports associated nausea without vomiting. She has had rectal "seepage" without joseline diarrhea.  She has not normal bowel movement several days.  States her symptoms are similar to prior small bowel obstructions.  She denies urinary symptoms, fevers, chest pain, shortness of breath or back pain.        Review of patient's allergies indicates:   Allergen Reactions    Codeine Nausea And Vomiting     Other reaction(s): Itching    Percocet  [oxycodone-acetaminophen]      Other reaction(s): Itching    Sulfa (sulfonamide antibiotics)      Other reaction(s): Nausea  Other reaction(s): Itching     Past Medical History:   Diagnosis Date    Abdominal pain     Allergic rhinitis     Arthritis     Blood transfusion     during delivery and     Bowel obstruction     Cervical radiculopathy     followed by dr cloud    Colon cancer     transverse colon; resected; Stage IIA (pT3 pN0 MX)    Diarrhea     Family history of breast cancer     Family history of colon cancer     Fatty liver     GERD (gastroesophageal reflux disease)     History of shingles     Hyperlipidemia     Hypothyroidism     Irritable bowel syndrome     Microscopic colitis     treated     Raynaud phenomenon     Raynaud's disease     Type 2 diabetes mellitus      Past Surgical History:   Procedure Laterality Date    APPENDECTOMY      BACK SURGERY      CARPAL TUNNEL RELEASE      bilateral      SECTION      " CHOLECYSTECTOMY  1965    COLECTOMY  06/27/2011    Transverse colon resection by Dr. Aguirre    COLONOSCOPY N/A 7/27/2017    Performed by [facility]jit [facility]varez [facility] at [facility] ENDO (4TH FLR)    COLONOSCOPY N/A 5/2/2013    Performed by Angelo Reynolds MD at [facility] ENDO (4TH FLR)    EGD (ESOPHAGOGASTRODUODENOSCOPY) N/A 11/16/2018    Performed by Angelo Reynolds [facility] at [facility] ENDO (2ND FLR)    EXPLORATORY-LAPAROTOMY N/A 4/1/2017    Performed by Herman Fletcher MD at [facility] OR 2ND FLR    EXPLORATORY-LAPAROTOMY N/A 9/2/2014    Performed by Gonzalez Silvestre MD at [facility] OR 2ND FLR    EYE SURGERY      Cataract Removal    HYSTERECTOMY      LYSIS-ADHESION N/A 4/1/2017    Performed by Herman Fletcher MD at [facility] OR 2ND FLR    LYSIS-ADHESION N/A 9/2/2014    Performed by Gonzalez [facility]vestre MD at [facility] OR 2ND FLR    PLACEMENT  4/1/2017    Performed by Herman Fletcher MD at [facility] 2ND FLR    posterolateral fusion with autograft bone and [place] mineralized bone matrix  2/1/13    at [facility] for lumbar spine stenosis    REPAIR  4/1/2017    Performed by Herman Fletcher MD at [facility] 2ND FLR    RESECTION - SMALL BOWEL N/A 9/2/2014    Performed by Gonzalez Silvestre MD at [facility] 2ND FLR    TOE SURGERY      TONSILLECTOMY      TRIGGER FINGER RELEASE      ULTRASOUND, UPPER GI TRACT, ENDOSCOPIC N/A 1/23/2019    Performed by Jose Hess MD at [facility] ENDO (2ND FLR)    ULTRASOUND, UPPER GI TRACT, ENDOSCOPIC N/A 12/12/2018    [facility] at [facility] ENDO     Family History   Problem Relation Age of Onset    Heart disease Father 50        Mi age 50    Colon cancer Father     Bladder Cancer Mother         non smoker    Cataracts Mother     Glaucoma Mother     Heart disease Mother     Hyperlipidemia Mother     Kidney disease Mother     Breast cancer Sister 79    Arthritis Daughter     Asthma Daughter     Depression Daughter     Hypertension Daughter     Stroke Daughter 40    Arthritis  Brother     Colon cancer Brother 70    Alcohol abuse Brother     Parkinsonism Brother     Alcohol abuse Brother     Depression Daughter     Celiac disease Neg Hx     Cirrhosis Neg Hx     Colon polyps Neg Hx     Crohn's disease Neg Hx     Cystic fibrosis Neg Hx     Esophageal cancer Neg Hx     Hemochromatosis Neg Hx     Inflammatory bowel disease Neg Hx     Irritable bowel syndrome Neg Hx     Liver cancer Neg Hx     Liver disease Neg Hx     Rectal cancer Neg Hx     Stomach cancer Neg Hx     Ulcerative colitis Neg Hx     Eliazar's disease Neg Hx     Amblyopia Neg Hx     Blindness Neg Hx     Macular degeneration Neg Hx     Retinal detachment Neg Hx     Strabismus Neg Hx     Melanoma Neg Hx      Social History     Tobacco Use    Smoking status: Never Smoker    Smokeless tobacco: Never Used   Substance Use Topics    Alcohol use: Yes     Comment: socially, hardly ever    Drug use: No     Review of Systems   Constitutional: Negative for chills, diaphoresis, fatigue and fever.   Respiratory: Negative for cough and shortness of breath.    Cardiovascular: Negative for chest pain and palpitations.   Gastrointestinal: Positive for abdominal pain, diarrhea and nausea. Negative for abdominal distention, anal bleeding, blood in stool, constipation, rectal pain and vomiting.   Endocrine: Negative for polydipsia and polyuria.   Genitourinary: Negative for difficulty urinating, dysuria, flank pain, hematuria and urgency.   Musculoskeletal: Negative for gait problem and myalgias.   Skin: Negative for color change and pallor.   Neurological: Negative for light-headedness and headaches.   Hematological: Negative for adenopathy. Does not bruise/bleed easily.       Physical Exam     Initial Vitals [05/10/19 1653]   BP Pulse Resp Temp SpO2   139/64 62 16 97.7 °F (36.5 °C) 100 %      MAP       --         Physical Exam    Vitals reviewed.  Constitutional: She appears well-developed and well-nourished. She is not  diaphoretic. No distress.   Daughter at bedside   HENT:   Head: Normocephalic and atraumatic.   Eyes: EOM are normal. Pupils are equal, round, and reactive to light.   Neck: Normal range of motion. Neck supple.   Cardiovascular: Normal rate, regular rhythm, normal heart sounds and intact distal pulses. Exam reveals no gallop and no friction rub.    No murmur heard.  Pulmonary/Chest: Breath sounds normal.   Abdominal: Soft. Bowel sounds are normal. There is no hepatosplenomegaly. There is generalized tenderness. There is no rigidity, no rebound, no guarding, no CVA tenderness, no tenderness at McBurney's point and negative Plascencia's sign.         ED Course   Procedures  Labs Reviewed   CBC W/ AUTO DIFFERENTIAL - Abnormal; Notable for the following components:       Result Value    Hemoglobin 10.2 (*)     Hematocrit 33.4 (*)     Mean Corpuscular Hemoglobin 24.9 (*)     Mean Corpuscular Hemoglobin Conc 30.5 (*)     RDW 16.2 (*)     Platelets 110 (*)     All other components within normal limits   COMPREHENSIVE METABOLIC PANEL   LIPASE   URINALYSIS, REFLEX TO URINE CULTURE   LACTIC ACID, PLASMA          Imaging Results    None          Medical Decision Making:   History:   Old Medical Records: I decided to obtain old medical records.  Clinical Tests:   Lab Tests: Ordered and Reviewed  Radiological Study: Ordered and Reviewed       APC / Resident Notes:   74-year-old female presenting with abdominal pain. Normal vitals, nontoxic appearing.  She has diffuse mild abdominal tenderness.  Differential diagnosis includes SBO, constipation, dehydration, electrolyte derangement.  Will check labs, give fluids, Zofran for nausea, morphine for pain, do CT abdomen pelvis and reassess.    Labs notable for mildly elevated lactic acid at 2.3.  Will continue to hydrate.  CT without evidence of SBO.  There is stool throughout the small bowel.  I suspect patient's symptoms are due to constipation.  I discussed these results with the  patient, she states that she would like to be discharged rather than receiving enema in the ED.  Will discharge with MiraLax and Colace and instructed patient to follow up with her PCP and GI doctor as needed. Stressed the importance of follow-up, strict ED return precautions given.  Patient voiced understanding and is comfortable with discharge. I discussed this patient with my supervising physician.    Marge Tanner PA-C                   Clinical Impression:       ICD-10-CM ICD-9-CM   1. Abdominal pain, unspecified abdominal location R10.9 789.00   2. Constipation, unspecified constipation type K59.00 564.00         Disposition:   Disposition: Discharged  Condition: Stable                        Marge Tanner PA-C  05/11/19 0234

## 2019-05-11 NOTE — DISCHARGE INSTRUCTIONS
Please schedule an appointment with your primary care doctor for follow-up. Make sure to stay hydrated. If you start to have any new or worsening symptoms, please come back to the emergency department.

## 2019-05-12 ENCOUNTER — TELEPHONE (OUTPATIENT)
Dept: GASTROENTEROLOGY | Facility: HOSPITAL | Age: 74
End: 2019-05-12

## 2019-05-15 ENCOUNTER — TELEPHONE (OUTPATIENT)
Dept: HEMATOLOGY/ONCOLOGY | Facility: CLINIC | Age: 74
End: 2019-05-15

## 2019-05-17 ENCOUNTER — TELEPHONE (OUTPATIENT)
Dept: GASTROENTEROLOGY | Facility: CLINIC | Age: 74
End: 2019-05-17

## 2019-05-17 NOTE — TELEPHONE ENCOUNTER
Patient was to be seen by dr paz in a few weeks , dr paz contacted pt no answer , no appt available with Dr. Paz appt scheduled with Dr. Jefferson on 5/22  Pt verbalized understanding

## 2019-05-21 ENCOUNTER — TELEPHONE (OUTPATIENT)
Dept: GASTROENTEROLOGY | Facility: CLINIC | Age: 74
End: 2019-05-21

## 2019-05-21 NOTE — TELEPHONE ENCOUNTER
Message left on voicemail with new appointment date and time. Instructed to call with any questions.

## 2019-05-22 ENCOUNTER — OFFICE VISIT (OUTPATIENT)
Dept: UROGYNECOLOGY | Facility: CLINIC | Age: 74
End: 2019-05-22
Payer: MEDICARE

## 2019-05-22 VITALS
BODY MASS INDEX: 26.13 KG/M2 | WEIGHT: 142 LBS | DIASTOLIC BLOOD PRESSURE: 60 MMHG | HEIGHT: 62 IN | SYSTOLIC BLOOD PRESSURE: 120 MMHG

## 2019-05-22 DIAGNOSIS — N95.2 VAGINAL ATROPHY: ICD-10-CM

## 2019-05-22 DIAGNOSIS — Z46.89 PESSARY MAINTENANCE: ICD-10-CM

## 2019-05-22 DIAGNOSIS — N81.6 RECTOCELE: ICD-10-CM

## 2019-05-22 DIAGNOSIS — N81.11 MIDLINE CYSTOCELE: ICD-10-CM

## 2019-05-22 DIAGNOSIS — N39.46 MIXED STRESS AND URGE URINARY INCONTINENCE: Primary | ICD-10-CM

## 2019-05-22 PROBLEM — N81.9 PROLAPSE OF FEMALE GENITAL ORGANS: Status: ACTIVE | Noted: 2019-05-22

## 2019-05-22 LAB
BILIRUB SERPL-MCNC: NORMAL MG/DL
BLOOD URINE, POC: NORMAL
COLOR, POC UA: YELLOW
GLUCOSE UR QL STRIP: NORMAL
KETONES UR QL STRIP: NORMAL
LEUKOCYTE ESTERASE URINE, POC: NORMAL
NITRITE, POC UA: NORMAL
PH, POC UA: 7
PROTEIN, POC: NORMAL
SPECIFIC GRAVITY, POC UA: 1
UROBILINOGEN, POC UA: NORMAL

## 2019-05-22 PROCEDURE — 99215 OFFICE O/P EST HI 40 MIN: CPT | Mod: PBBFAC | Performed by: NURSE PRACTITIONER

## 2019-05-22 PROCEDURE — 81002 URINALYSIS NONAUTO W/O SCOPE: CPT | Mod: PBBFAC | Performed by: NURSE PRACTITIONER

## 2019-05-22 PROCEDURE — 99213 OFFICE O/P EST LOW 20 MIN: CPT | Mod: S$PBB,,, | Performed by: NURSE PRACTITIONER

## 2019-05-22 PROCEDURE — 99999 PR PBB SHADOW E&M-EST. PATIENT-LVL V: CPT | Mod: PBBFAC,,, | Performed by: NURSE PRACTITIONER

## 2019-05-22 PROCEDURE — 99999 PR PBB SHADOW E&M-EST. PATIENT-LVL V: ICD-10-PCS | Mod: PBBFAC,,, | Performed by: NURSE PRACTITIONER

## 2019-05-22 PROCEDURE — 99213 PR OFFICE/OUTPT VISIT, EST, LEVL III, 20-29 MIN: ICD-10-PCS | Mod: S$PBB,,, | Performed by: NURSE PRACTITIONER

## 2019-05-22 RX ORDER — FLUCONAZOLE 150 MG/1
150 TABLET ORAL
Qty: 3 TABLET | Refills: 0 | Status: SHIPPED | OUTPATIENT
Start: 2019-05-22 | End: 2019-05-27

## 2019-05-22 NOTE — PROGRESS NOTES
Urogyn follow up  02/20/2019  .  [facility]   [address] [address]    June Hardik Judd  [id number]  1945 June Hardik Judd is a 74 y.o.  here for a urogyn follow up for recurrent uti.    Last HPI from 02/20/2019  1)  Mixed urinary incontinence, urge > stress:    --initially improved with pessary.   Pessary has been out since 11/2018-- was hospitalized for abdominal pain / ingestion of fish bone   2)  Vaginal atrophy (dryness):    --using estrogen cream  --using osphena   3)  Incomplete bladder emptying:  --can't tell if pessary helping; does have moderate 2nd void volumes by straining.  --currently being treated for uti  4)  Frequent UTIs (bladder infections):  --currently on keflex 500 mg twice daily x 10 days  --control diabetes  --work on improving bladder emptying  --has not been taking 250 mg maintenance dose  5)  Constipation intermittent with loose stool:  --better with 4-5 tsps/day  --not needing stool softener  --continue use miralax daily--see if taking 1/2 dose is sufficient   6)  Elevated creatinine:  --resolved      Changes from last visit:  1)  Mixed urinary incontinence, urge > stress:    --initially improved with pessary.   Pessary has been out since 11/2018-- was hospitalized for abdominal pain / ingestion of fish bone     2)  Vaginal atrophy (dryness):    --using estrogen cream  --using osphena      3)  Incomplete bladder emptying:  --can't tell if pessary helping; does have moderate 2nd void volumes by straining.  --currently being treated for uti     4)  Frequent UTIs (bladder infections):  --control diabetes  --work on improving bladder emptying  --has not been taking 250 mg maintenance dose     5)  Constipation intermittent with loose stool:  --better with 4-5 tsps/day  --not needing stool softener  --continue use miralax daily--see if taking 1/2 dose is sufficient        11/26/2018  Cysto with [place]  FINDINGS:  Dome, anterior,  posterior, lateral walls and bladder base free of urothelial abnormalities. Right and left ureteral orifices in the normal postion and configuration, both effluxed clear urine.  Bladder neck and urethra were normal    11/15/2018  Retroperitoneal ultrasound  Right kidney: Normal in size measuring 10 cm. Increased cortical echogenicity.  No loss of corticomedullary distinction. Resistive index measures 0.70.  No mass. No renal stone. Mild hydronephrosis.  Left kidney: Normal in size measuring 10.1 cm. Increased cortical echogenicity.  No loss of corticomedullary distinction. Resistive index measures 0.74.  No mass. No renal stone. Mildly prominent collecting system, possibly representing mild hydronephrosis.  Urinary bladder is distended but otherwise unremarkable.  Both ureteral jets are seen.  Impression  Mild bilateral hydronephrosis, right side greater than left, similar to recent CT.  Distended urinary bladder with preservation of bilateral ureteral jets.    Past Medical History:   Diagnosis Date    Abdominal pain     Allergic rhinitis     Arthritis     Blood transfusion     during delivery and     Bowel obstruction     Cervical radiculopathy     followed by dr cloud    Colon cancer     transverse colon; resected; Stage IIA (pT3 pN0 MX)    Diarrhea     Family history of breast cancer     Family history of colon cancer     Fatty liver     GERD (gastroesophageal reflux disease)     History of shingles     Hyperlipidemia     Hypothyroidism     Irritable bowel syndrome     Microscopic colitis     treated     Raynaud phenomenon     Raynaud's disease     Type 2 diabetes mellitus        Past Surgical History:   Procedure Laterality Date    APPENDECTOMY      BACK SURGERY      CARPAL TUNNEL RELEASE      bilateral      SECTION      CHOLECYSTECTOMY  1965    COLECTOMY  2011    Transverse colon resection by Dr. Aguirre    COLONOSCOPY N/A 2017    Performed by Manjit  LARON Alvarez MD at [facility] ENDO (4TH FLR)    COLONOSCOPY N/A 5/2/2013    Performed by Angelo Reynolds MD at [facility] ENDO (4TH FLR)    EGD (ESOPHAGOGASTRODUODENOSCOPY) N/A 11/16/2018    Performed by Angelo Reynolds MD at [facility] ENDO (2ND FLR)    EXPLORATORY-LAPAROTOMY N/A 4/1/2017    Performed by Herman Fletcher MD at [facility] OR 2ND FLR    EXPLORATORY-LAPAROTOMY N/A 9/2/2014    Performed by Gonzalez Silvestre MD at [facility] OR 2ND FLR    EYE SURGERY      Cataract Removal    HYSTERECTOMY      LYSIS-ADHESION N/A 4/1/2017    Performed by Herman Fletcher MD at [facility] OR 2ND FLR    LYSIS-ADHESION N/A 9/2/2014    Performed by Gonzalez Silvestre MD at [facility] OR 2ND FLR    PLACEMENT  4/1/2017    Performed by Herman Fletcher MD at [facility] OR 2ND FLR    posterolateral fusion with autograft bone and [place] mineralized bone matrix  2/1/13    at [facility] for lumbar [facility] stenosis    REPAIR  4/1/2017    Performed by Herman Fletcher MD at [facility] OR 2ND FLR    RESECTION - SMALL BOWEL N/A 9/2/2014    Performed by Gonzalez Silvestre MD at [facility] OR 2ND FLR    TOE SURGERY      TONSILLECTOMY      TRIGGER FINGER RELEASE      ULTRASOUND, UPPER GI TRACT, ENDOSCOPIC N/A 1/23/2019    Performed by [facility] at [facility] (2ND FLR)    ULTRASOUND, UPPER GI TRACT, ENDOSCOPIC N/A 12/12/2018    [facility] by [facility] at [facility]       Current Outpatient Medications   Medication Sig    atorvastatin (LIPITOR) 40 MG tablet TAKE 1 TABLET(40 MG) BY MOUTH EVERY DAY    betamethasone dipropionate (DIPROLENE) 0.05 % ointment Apply every Am x 2 weeks then twice weekly    blood sugar diagnostic Strp Dispense madie contour strips; test blood sugar once daily    blood-glucose meter (CONTOUR METER) kit Please dispense Madie Contour meter and supplies Use as instructed Test Blood sugar once daily    cephALEXin (KEFLEX) 250 MG capsule Take 1 capsule (250 mg total) by mouth once daily.    conjugated estrogens (PREMARIN) vaginal  cream Place 0.5 g vaginally every evening.    diphenhydrAMINE (BENADRYL) 25 mg capsule Take 1 each (25 mg total) by mouth nightly as needed for Itching, Allergies or Insomnia.    fluticasone (FLONASE) 50 mcg/actuation nasal spray 2 sprays by Each Nare route once daily.    FREESTYLE EFREN READER Misc TEST as directed    FREESTYLE EFREN SENSOR Kit     gabapentin (NEURONTIN) 600 MG tablet Take 1,200 mg by mouth 2 (two) times daily.     hydrocortisone 2.5 % cream     Lactobacillus rhamnosus GG (CULTURELLE) 10 billion cell capsule Take 1 capsule by mouth once daily.    lancets (ONE TOUCH ULTRASOFT LANCETS) Misc Test blood sugar once daily    levothyroxine (SYNTHROID) 100 MCG tablet TAKE 1 TABLET BY MOUTH EVERY MORNING    linagliptin (TRADJENTA) 5 mg Tab tablet Take 1 tablet (5 mg total) by mouth once daily.    magnesium 30 mg Tab Take 500 capsules by mouth. Patient's taking magnesium 500mg QD    meloxicam (MOBIC) 15 MG tablet TAKE 1 TABLET BY MOUTH DAILY WITH FOOD AS NEEDED FOR PAIN OR ARTHRITIS    metFORMIN (GLUCOPHAGE-XR) 750 MG 24 hr tablet Take 1 tablet (750 mg total) by mouth 2 (two) times daily with meals.    mirabegron 50 mg Tb24 Take 1 tablet (50 mg total) by mouth once daily.    nitrofurantoin, macrocrystal-monohydrate, (MACROBID) 100 MG capsule Take 1 capsule (100 mg total) by mouth 2 (two) times daily.    ondansetron (ZOFRAN) 4 MG tablet Take 1 tablet (4 mg total) by mouth every 8 (eight) hours as needed for Nausea.    ospemifene (OSPHENA) 60 mg Tab Take 60 mg by mouth once daily.    pantoprazole (PROTONIX) 40 MG tablet TAKE 1 TABLET BY MOUTH ONCE DAILY    polyethylene glycol (GLYCOLAX) 17 gram/dose powder Take 17 g by mouth once daily.    SHINGRIX, PF, 50 mcg/0.5 mL injection     triamcinolone acetonide 0.1% (KENALOG) 0.1 % paste apply to affected area with A Q-TIP three times a day    VIT A/VIT C/VIT E/ZINC/COPPER (ICAPS AREDS ORAL) Take by mouth 2 (two) times daily.    vitamin D 1000  "units Tab Take 185 mg by mouth once daily. D3    azelastine (ASTELIN) 137 mcg nasal spray 1 spray (137 mcg total) by Nasal route 2 (two) times daily.    docusate sodium (COLACE) 100 MG capsule Take 1 capsule (100 mg total) by mouth 2 (two) times daily.    fluconazole (DIFLUCAN) 150 MG Tab Take 1 tablet (150 mg total) by mouth every 48 hours. for 3 doses     Current Facility-Administered Medications   Medication    amoxicillin capsule 500 mg       Well woman:  Pap test: post ANGEL.  History of abnormal paps: Yes--had ANGEL.  History of STIs:  No  Mammogram: Date of last: 2018.  Result: Normal per report ([facility])  Colonoscopy: Date of last: 2017.  Result:  Benign polyps.  Repeat due:  2019.    DEXA:  Date of last: 2015.  Result:  Normal.      ROS:  As per HPI.      Exam  /60 (BP Location: Right arm, Patient Position: Sitting, BP Method: Medium (Manual))   Ht 5' 2" (1.575 m)   Wt 64.4 kg (141 lb 15.6 oz)   LMP  (LMP Unknown)   BMI 25.97 kg/m²   General: alert and oriented, no acute distress  Respiratory: normal respiratory effort  Abd: soft, non-tender, non-distended    Pelvic  Ext. Genitalia:external genitalia no lesions. Atrophic. Normal bartholin's and skeens glands  Vagina: + atrophy. Normal vaginal mucosa without lesions. No discharge noted.    #2 ring with support pessary in place  Cervix: absent  Uterus:  absent   Urethra: no masses or tenderness  Urethral meatus: no lesions, caruncle or prolapse.    Pessary removed without difficulty. Washed with soap and water. Reinserted without difficulty.    Impression  1. Mixed stress and urge urinary incontinence  POCT URINE DIPSTICK WITHOUT MICROSCOPE   2. Vaginal atrophy     3. Midline cystocele     4. Rectocele     5. Pessary maintenance       We reviewed the above issues and discussed options for short-term versus long-term management of her problems.   Plan:     1)  Mixed urinary incontinence, urge > stress:    --control diabetes  --Empty bladder every 3 " hours.  Empty well: wait a minute, lean forward on toilet.    --Avoid dietary irritants (see sheet).  Keep diary x 3-5 days to determine your irritants.  --consider PT in future   --URGE: consider medication (Mirabegron due to SBO).  Takes 2-4 weeks to see if will have effect.  For dry mouth: get sour, sugar free lozenge or gum.    --STRESS:  Pessary vs. Sling.   --continue #2 ring with support pessary     2)  Vaginal atrophy (dryness):  Use dime-sized amount of estrogen cream with finger around vaginal opening/inner lips  twice weekly.     --continue osphena  --may also reduce bladder infection rate     3)  Vulvar irritation:  --use betamethasone Am  twice weekly  --use estrogen cream  Pm  twice weekly  --use estrogen cream in the vaginal nightly twice weekly  --use moisture barrier ointment    4)  Incomplete bladder emptying:  --resolved with pessary     5)  Frequent UTIs (bladder infections):  --control diabetes  --work on improving bladder emptying  --continue Keflex 250 mg daily  --If you feel like you have a UTI, please call our office so that we can place an order for you to drop off a urine specimen at the closest [facility] lab (we will arrange).                --We will call in antibiotics for you to start right after you drop off specimen.                --In this way, we can determine:                           1)  Do you have a UTI?                            2) If you have a UTI, is it sensitive to the antibiotics we prescribed?   --follow UTI prevention tips (see attached)  --control bowel movements/fecal cross-contamination  --treat vaginal atrophy (dryness)  --empty bladder before and after intercourse  --consider need for further evaluation (pelvic imaging and cystoscopy) if issue persists; 618 CT A/P  normal     6)  Constipation intermittent with loose stool:  Controlling constipation may help bladder urgency/leakage and fiber may better control cholesterol and blood glucose.  Increas daily fiber.   Increase fiber by 1 tablet every 3-5 days until stool is easy to pass. Take fiber tablets with large glass of water.  When find place where stool is easy to pass and more well-formed, less loose/accidents,  stop and continue at that dose.   Do not exceed 6 tablets/day.    --May also use over the counter stool softener 1-2 x/day.  --continue use miralax daily--see if taking 1/2 dose is sufficient     7)  RTC 3 months for pc    rx for diflucan give since she is travelling    30 minutes were spent in face to face time with this patient  90 % of this time was spent in counseling and/or coordination of care    KENNEDY Petit-BC [facility]  Division of Female Pelvic Medicine and Reconstructive Surgery  Department of Obstetrics & Gynecology

## 2019-05-22 NOTE — PATIENT INSTRUCTIONS
1)  Mixed urinary incontinence, urge > stress:    --control diabetes  --Empty bladder every 3 hours.  Empty well: wait a minute, lean forward on toilet.    --Avoid dietary irritants (see sheet).  Keep diary x 3-5 days to determine your irritants.  --consider PT in future   --URGE: consider medication (Mirabegron due to SBO).  Takes 2-4 weeks to see if will have effect.  For dry mouth: get sour, sugar free lozenge or gum.    --STRESS:  Pessary vs. Sling.   --continue #2 ring with support pessary     2)  Vaginal atrophy (dryness):  Use dime-sized amount of estrogen cream with finger around vaginal opening/inner lips  twice weekly.     --continue osphena  --may also reduce bladder infection rate     3)  Vulvarl irritation:  --use betamethasone Am  twice weekly  --use estrogen cream  Pm  twice weekly  --use estrogen cream in the vaginal nightly twice weekly  --use moisture barrier ointment    4)  Incomplete bladder emptying:  --resolved with pessary     5)  Frequent UTIs (bladder infections):  --control diabetes  --work on improving bladder emptying  --continue Keflex 250 mg daily  --If you feel like you have a UTI, please call our office so that we can place an order for you to drop off a urine specimen at the closest [facility] lab (we will arrange).                --We will call in antibiotics for you to start right after you drop off specimen.                --In this way, we can determine:                           1)  Do you have a UTI?                            2) If you have a UTI, is it sensitive to the antibiotics we prescribed?   --follow UTI prevention tips (see attached)  --control bowel movements/fecal cross-contamination  --treat vaginal atrophy (dryness)  --empty bladder before and after intercourse  --consider need for further evaluation (pelvic imaging and cystoscopy) if issue persists; 618 CT A/P  normal     6)  Constipation intermittent with loose stool:  Controlling constipation may help bladder  urgency/leakage and fiber may better control cholesterol and blood glucose.  Increas daily fiber.  Increase fiber by 1 tablet every 3-5 days until stool is easy to pass. Take fiber tablets with large glass of water.  When find place where stool is easy to pass and more well-formed, less loose/accidents,  stop and continue at that dose.   Do not exceed 6 tablets/day.    --May also use over the counter stool softener 1-2 x/day.  --continue use miralax daily--see if taking 1/2 dose is sufficient     7)  RTC 3 months for pc    rx for diflucan give since she is travelling

## 2019-05-23 ENCOUNTER — OFFICE VISIT (OUTPATIENT)
Dept: GASTROENTEROLOGY | Facility: CLINIC | Age: 74
End: 2019-05-23
Payer: MEDICARE

## 2019-05-23 VITALS
WEIGHT: 142.44 LBS | SYSTOLIC BLOOD PRESSURE: 111 MMHG | HEART RATE: 65 BPM | HEIGHT: 63 IN | BODY MASS INDEX: 25.24 KG/M2 | DIASTOLIC BLOOD PRESSURE: 65 MMHG

## 2019-05-23 DIAGNOSIS — R19.7 DIARRHEA, UNSPECIFIED TYPE: Primary | ICD-10-CM

## 2019-05-23 PROCEDURE — 99213 PR OFFICE/OUTPT VISIT, EST, LEVL III, 20-29 MIN: ICD-10-PCS | Mod: S$PBB,GC,, | Performed by: INTERNAL MEDICINE

## 2019-05-23 PROCEDURE — 99999 PR PBB SHADOW E&M-EST. PATIENT-LVL V: CPT | Mod: PBBFAC,GC,, | Performed by: INTERNAL MEDICINE

## 2019-05-23 PROCEDURE — 99215 OFFICE O/P EST HI 40 MIN: CPT | Mod: PBBFAC | Performed by: INTERNAL MEDICINE

## 2019-05-23 PROCEDURE — 99213 OFFICE O/P EST LOW 20 MIN: CPT | Mod: S$PBB,GC,, | Performed by: INTERNAL MEDICINE

## 2019-05-23 PROCEDURE — 99999 PR PBB SHADOW E&M-EST. PATIENT-LVL V: ICD-10-PCS | Mod: PBBFAC,GC,, | Performed by: INTERNAL MEDICINE

## 2019-05-23 NOTE — PROGRESS NOTES
[facility]    Reason for visit: The encounter diagnosis was Diarrhea, unspecified type.  Referring Provider/PCP: Rocio Mc MD    History of Present Illness:  Anayeli Judd is a 74 y.o. female with a history of CRC, multiple admissions for SBO, IBS, microscopic colitis, HLD, T2DM, GERD, Hypothyroidism who is presenting for follow-up evaluation of abdominal pain and diarrhea.    She notes that since she was last seen (3/4/19) she was doing well. She notes that ~2 weeks ago, however, she developed increased bowel movements (baseline 5-6/day, generally formed in AM with progressively more loose as day progresses). Her bowel movements were initially increased frequency (up to 15/d) watery (small volume) and then they became 'like a mudslide' with incontinence associated. She notes ~2 days thereafter she began to develop extreme abdominal pain (periumbilical), which led her to the ED for evaluation.    Labs in the ED included unremarkable CBC, BMP, LFT, lipase, urinalysis, slightly elevated lactate (2.3). CTAP was performed with no acute changes (see below). She was discharged home from the ED. After leaving the hospital she notes that her pain improved (currently without pain). She was recommended to try laxatives but notes she did not have significant bowel movement with dulcolax (took several times) and then took mag citrate once with bowel movement but not her usual volume with mag citrate.    Currently she reports her pain has resolved with exception of sporadic, sharp abdominal pain (this is baseline) which occurs at various locations in the abdomen and lasts only a few moments. The pain which brought her to the hospital has resolved. Endorses bowel movements currently 3-5/morning and continues to have significant issue with incontinence. She notes she will have incontinence frequently during the day, remarking that often when she passes urine she will remark on stool on her  pad (watery stool) which she did not have any sense of passing. She remarks that she will frequently avoid going out (I.e. [employer] or to dinner with friends, etc) because of fear of incontinence. She notes that she has had significant large volume incontinence in the store previously (passed without sense of urgency). Denies taking any anti-diarrheal medications due to concern for becoming constipated. Takes miralax and fiber supplementation. Has tried pelvic PT in the past without effect (negative defecography 2017).    PEndoHx:  EUS. (19) Provider: Dr. Hess. Indication: CT Abnormality.   - heterogeneous appearance of liver  - normal appearance of the pancreas  - minor superficial mucosal tear at the cricopharyngeus  - non-bleeding gastric ulcer     EGD. (18). Dr. Reynolds. Indication:Abdominal pain.  - normal study  - duodenal and gastric biopsies negative. Negative HPylori.     Colonoscopy. (17) Provider: Dr. Alvarez. Indication: Diarrhea. Extent: Cecum. Preparation:Adequate.  - 5mm transverse polyp  - 2x 3mm transverse polyp [tubular adenoma]  - random colonic biopsies [negative for microscopic colitis    Review of Systems:   Constitutional: no fever, chills or change in weight   Eyes: no visual changes   ENT: no sore throat or dysphagia  Respiratory: no cough or shortness of breath   Cardiovascular: no chest pain or palpitations   Gastrointestinal: as per HPI  Hematologic/Lymphatic: no easy bruising or lymphadenopathy   Musculoskeletal: no arthralgias or myalgias   Neurological: no change in mental status  Behavioral/Psych: no change in mood    Medical History:  Past Medical History:   Diagnosis Date    Abdominal pain     Allergic rhinitis     Arthritis     Blood transfusion     during delivery and     Bowel obstruction     Cervical radiculopathy     followed by dr reynolds    Colon cancer     transverse colon; resected; Stage IIA (pT3 pN0 MX)    Diarrhea     Family history  of breast cancer     Family history of colon cancer     Fatty liver     GERD (gastroesophageal reflux disease)     History of shingles     Hyperlipidemia     Hypothyroidism     Irritable bowel syndrome     Microscopic colitis     treated     Raynaud phenomenon     Raynaud's disease     Type 2 diabetes mellitus        Past Surgical History:   Procedure Laterality Date    APPENDECTOMY      BACK SURGERY      CARPAL TUNNEL RELEASE      bilateral      SECTION      CHOLECYSTECTOMY  1965    COLECTOMY  2011    Transverse colon resection by Dr. Aguirre    COLONOSCOPY N/A 2017    [facility] (4[facility] F[facility])    COLONOSCOPY N/A 2013    [facility] by [facility]o Reynolds [facility] at [facility] ([facility] F[facility])    EGD (ESOPHAGOGASTRODUODENOSCOPY) N/A 2018    [facility] (2[facility]    EXPLORATORY-LAPAROTOMY N/A 2017    [facility] Herman Fletcher [facility] at [facility]    EXPLORATORY-LAPAROTOMY N/A 2014    [facility] at [facility]    EYE SURGERY      Cataract Removal    HYSTERECTOMY      LYSIS-ADHESION N/A 2017    [facility]    LYSIS-ADHESION N/A 2014    [facility]    PLACEMENT  2017    [facility]    posterolateral fusion with autograft bone and Eun mineralized bone matrix  13    [facility] for lumbar spine stenosis    REPAIR  2017    [facility]    RESECTION - SMALL BOWEL N/A 2014    [facility]    TOE SURGERY      TONSILLECTOMY      TRIGGER FINGER RELEASE      ULTRASOUND, UPPER GI TRACT, ENDOSCOPIC N/A 2019    Performed [facility] Jose [facility] at [facility] (2ND FLR)    ULTRASOUND, UPPER GI TRACT, ENDOSCOPIC N/A 2018    [facility]       Family History   Problem Relation Age of Onset    Heart disease Father 50        Mi age 50    Colon cancer Father     Bladder Cancer Mother         non smoker    Cataracts Mother     Glaucoma Mother     Heart disease Mother     Hyperlipidemia Mother     Kidney disease Mother     Breast cancer Sister 79    Arthritis Daughter     Asthma Daughter     Depression Daughter     Hypertension Daughter     Stroke Daughter 40    Arthritis Brother     Colon cancer Brother 70    Alcohol abuse Brother     Parkinsonism Brother     Alcohol abuse Brother     Depression Daughter     Celiac disease Neg Hx     Cirrhosis Neg Hx     Colon polyps Neg Hx     Crohn's disease Neg Hx     Cystic fibrosis Neg Hx     Esophageal cancer Neg Hx     Hemochromatosis Neg Hx     Inflammatory bowel disease Neg Hx     Irritable bowel syndrome Neg Hx     Liver cancer Neg Hx     Liver disease Neg Hx     Rectal cancer Neg Hx     Stomach cancer Neg Hx     Ulcerative colitis Neg Hx     Eliazar's disease Neg Hx     Amblyopia Neg Hx     Blindness Neg Hx     Macular degeneration Neg Hx     Retinal detachment Neg Hx     Strabismus Neg Hx     Melanoma Neg Hx        Social History     Socioeconomic History    Marital status:      Spouse name: Not on file    Number of children: Not on file    Years of education: Not on file    Highest education level: Not on file   Occupational History    Not on file   Social Needs    Financial resource strain: Not on file    Food insecurity:     Worry: Not on file     Inability: Not on file    Transportation needs:     Medical: Not on file     Non-medical: Not on file   Tobacco Use    Smoking status: Never Smoker    Smokeless tobacco: Never Used   Substance and Sexual Activity    Alcohol use: Yes     Comment: socially, hardly ever    Drug use: No    Sexual activity: Never     Birth control/protection: Abstinence   Lifestyle     Physical activity:     Days per week: Not on file     Minutes per session: Not on file    Stress: Not on file   Relationships    Social connections:     Talks on phone: Not on file     Gets together: Not on file     Attends [employer] service: Not on file     Active member of club or organization: Not on file     Attends meetings of clubs or organizations: Not on file     Relationship status: Not on file   Other Topics Concern    Are you pregnant or think you may be? Not Asked    Breast-feeding Not Asked   Social History Narrative    , lives alone.  Primary support are her children and friends.       Current Outpatient Medications on File Prior to Visit   Medication Sig Dispense Refill    atorvastatin (LIPITOR) 40 MG tablet TAKE 1 TABLET(40 MG) BY MOUTH EVERY DAY 90 tablet 0    betamethasone dipropionate (DIPROLENE) 0.05 % ointment Apply every Am x 2 weeks then twice weekly 45 g 3    blood sugar diagnostic Strp Dispense [employer] contour strips; test blood sugar once daily 100 strip 11    blood-glucose meter (CONTOUR METER) kit Please dispense [employer] Contour meter and supplies Use as instructed Test Blood sugar once daily 1 each 0    cephALEXin (KEFLEX) 250 MG capsule Take 1 capsule (250 mg total) by mouth once daily. 30 capsule 6    conjugated estrogens (PREMARIN) vaginal cream Place 0.5 g vaginally every evening. 30 g 11    diphenhydrAMINE (BENADRYL) 25 mg capsule Take 1 each (25 mg total) by mouth nightly as needed for Itching, Allergies or Insomnia.  0    docusate sodium (COLACE) 100 MG capsule Take 1 capsule (100 mg total) by mouth 2 (two) times daily. 60 capsule 0    fluconazole (DIFLUCAN) 150 MG Tab Take 1 tablet (150 mg total) by mouth every 48 hours. for 3 doses 3 tablet 0    fluticasone (FLONASE) 50 mcg/actuation nasal spray 2 sprays by Each Nare route daily as needed.   0    FREESTYLE EFREN READER Misc TEST as directed  0    FREESTYLE EFREN SENSOR Kit   0    gabapentin (NEURONTIN) 600 MG  tablet Take 1,200 mg by mouth 2 (two) times daily.       hydrocortisone 2.5 % cream   0    Lactobacillus rhamnosus GG (CULTURELLE) 10 billion cell capsule Take 1 capsule by mouth once daily.      lancets (ONE TOUCH ULTRASOFT LANCETS) Misc Test blood sugar once daily 200 each 11    levothyroxine (SYNTHROID) 100 MCG tablet TAKE 1 TABLET BY MOUTH EVERY MORNING 90 tablet 0    linagliptin (TRADJENTA) 5 mg Tab tablet Take 1 tablet (5 mg total) by mouth once daily. 90 tablet 3    magnesium 30 mg Tab Take 500 capsules by mouth. Patient's taking magnesium 500mg QD      meloxicam (MOBIC) 15 MG tablet TAKE 1 TABLET BY MOUTH DAILY WITH FOOD AS NEEDED FOR PAIN OR ARTHRITIS 90 tablet 0    metFORMIN (GLUCOPHAGE-XR) 750 MG 24 hr tablet Take 1 tablet (750 mg total) by mouth 2 (two) times daily with meals. 60 tablet 11    mirabegron 50 mg Tb24 Take 1 tablet (50 mg total) by mouth once daily. 30 tablet 11    nitrofurantoin, macrocrystal-monohydrate, (MACROBID) 100 MG capsule Take 1 capsule (100 mg total) by mouth 2 (two) times daily. 14 capsule 0    ondansetron (ZOFRAN) 4 MG tablet Take 1 tablet (4 mg total) by mouth every 8 (eight) hours as needed for Nausea. 12 tablet 0    ospemifene (OSPHENA) 60 mg Tab Take 60 mg by mouth once daily. 90 tablet 1    pantoprazole (PROTONIX) 40 MG tablet TAKE 1 TABLET BY MOUTH ONCE DAILY 90 tablet 0    polyethylene glycol (GLYCOLAX) 17 gram/dose powder Take 17 g by mouth once daily. 235 g 0    SHINGRIX, PF, 50 mcg/0.5 mL injection       triamcinolone acetonide 0.1% (KENALOG) 0.1 % paste apply to affected area with A Q-TIP three times a day  0    VIT A/VIT C/VIT E/ZINC/COPPER (ICAPS AREDS ORAL) Take by mouth 2 (two) times daily.      vitamin D 1000 units Tab Take 185 mg by mouth once daily. D3      azelastine (ASTELIN) 137 mcg nasal spray 1 spray (137 mcg total) by Nasal route 2 (two) times daily. 30 mL 1     Current Facility-Administered Medications on File Prior to Visit    Medication Dose Route Frequency Provider Last Rate Last Dose    amoxicillin capsule 500 mg  500 mg Oral Once Viridiana Valenzuela MD           Review of patient's allergies indicates:   Allergen Reactions    Codeine Nausea And Vomiting     Other reaction(s): Itching    Percocet  [oxycodone-acetaminophen]      Other reaction(s): Itching    Sulfa (sulfonamide antibiotics)      Other reaction(s): Nausea  Other reaction(s): Itching       Physical Exam:  General: Alert and Oriented x3, no distress. Well dressed and groomed. Emotional when discussing incontinence  Vitals:    05/23/19 1440   BP: 111/65   Pulse: 65     HEENT: Normocephalic, Atraumatic. No scleral icterus.  Lymph: No cervical lymphadenopathy  Resp: Good air entry bilaterally, no adventitious sounds.  Cardiac: S1 and S2 normal.  Abdomen: Normoactive bowel sounds. Non-distended. Normal tympany. Soft. Tender to palpation mild tenderness mid-epigastric.. No peritoneal signs. Multiple healed abdominal incisions. ARMAND with anal wink present, sensation to palpation present, weak resting anal tone, poor volitional contraction of external sphincter; no palpable masses, stool in vault, brown.  Extremities: No peripheral edema. Normal bilateral pedal and radial pulses.  Neurologic: No gross neurological Deficits  Psych: Calm, cooperative. Normal mood and affect.    Laboratory:  Lab Results   Component Value Date     05/10/2019    K 4.5 05/10/2019     05/10/2019    CO2 24 05/10/2019    BUN 18 05/10/2019    CREATININE 0.9 05/10/2019    CALCIUM 9.9 05/10/2019    ANIONGAP 10 05/10/2019    ESTGFRAFRICA >60.0 05/10/2019    EGFRNONAA >60.0 05/10/2019       Lab Results   Component Value Date    ALT 23 05/10/2019    AST 29 05/10/2019    ALKPHOS 55 05/10/2019    BILITOT 0.6 05/10/2019       Lab Results   Component Value Date    WBC 7.25 05/10/2019    HGB 10.2 (L) 05/10/2019    HCT 33.4 (L) 05/10/2019    MCV 82 05/10/2019     (L) 05/10/2019        Microbiology:  No Pertinent Microbiology    Imaging:  CT AP (5/10/19)  - Stable postoperative changes of multiple small and large bowel resections.  Moderate volume fluid throughout the small bowel as well as colonic stool burden.  No evidence of acute inflammatory process or obstruction.  - Stable several bilateral renal hypodensities, too small to definitively characterize, but favored to represent cyst.  - Multilevel degenerative changes of the spine and L5 pars defects and anterolisthesis, unchanged.    Assessment:  Anayeli Judd is a 74 y.o. female who is presenting for follow-up evaluation of abdominal pain and diarrhea.    Unclear the cause of her symptoms which precipitated her presentation to the ED. She has had multiple similar presentations in the past with multiple recent negative workups. Her symptoms including acute abdominal pain, firm abdomen in setting of prior abdominal surgeries and SBO is concerning for pSBO or internal hernia, though imaging was unremarkable. Though the increased bowel movements preceding would not be completely consistent with this diagnosis.    Currently her primary complaint is her diarrhea and incontinence. The diarrhea has been ongoing since her CCY and she has had thorough recent evaluation. She had a prior history of microscopic colitis, though recent biopsies (2017) were negative. Prior negative stool studies (though is currently on chronic antibiotics). Negative fecal fat/elastase. Negative EUS. Negative neuroendocrine evaluation. Negative celiac. Thyroid well controlled.    Incontinence has been evaluated previously as well, and at one time was being consider for an interstim. She had a negative defacography study. Her ARMAND is notable for poor resting tone. She has some history of urge incontinence, though primarily report sounds like stress incontinence. Overflow incontinence is possible.    Plan:  1. Given LT antibiotics, will check stool pathogen panel to  exclude infectious etiology  2. EGD to re-evaluate gastric ulcer from recent EUS  3. Colonoscopy to re-evaluate for microscopic colitis  4. Trial of cholestyramine  5. Trial of lactaid for 2 weeks  6. Discontinue miralax, continue fiber  Follow up in about 6 months (around 11/23/2019), or if symptoms worsen or fail to improve.    Guerrero Betts MD  Gastroenterology Fellow (PGY IV)  Phone: [phone]    Orders Placed This Encounter   Procedures    Gastrointestinal Pathogens Panel, PCR

## 2019-05-23 NOTE — PATIENT INSTRUCTIONS
1. Stop miralax  2. Continue fibercon  3. Start cholestyramine 4g daily at lunch time (do not take 4 hours before or after medications)  4. Repeat upper and lower endoscopy  5. Stool study for infection  6. Trial of lactaid for 2 weeks

## 2019-05-24 DIAGNOSIS — Z12.11 COLON CANCER SCREENING: Primary | ICD-10-CM

## 2019-05-24 RX ORDER — POLYETHYLENE GLYCOL 3350, SODIUM SULFATE ANHYDROUS, SODIUM BICARBONATE, SODIUM CHLORIDE, POTASSIUM CHLORIDE 236; 22.74; 6.74; 5.86; 2.97 G/4L; G/4L; G/4L; G/4L; G/4L
4 POWDER, FOR SOLUTION ORAL ONCE
Qty: 4000 ML | Refills: 0 | Status: SHIPPED | OUTPATIENT
Start: 2019-05-24 | End: 2019-05-24

## 2019-06-20 ENCOUNTER — TELEPHONE (OUTPATIENT)
Dept: ENDOSCOPY | Facility: HOSPITAL | Age: 74
End: 2019-06-20

## 2019-06-20 ENCOUNTER — HOSPITAL ENCOUNTER (EMERGENCY)
Facility: HOSPITAL | Age: 74
Discharge: HOME | End: 2019-06-20
Attending: EMERGENCY MEDICINE
Payer: MEDICARE

## 2019-06-20 VITALS
SYSTOLIC BLOOD PRESSURE: 105 MMHG | WEIGHT: 143 LBS | OXYGEN SATURATION: 97 % | RESPIRATION RATE: 16 BRPM | HEART RATE: 65 BPM | DIASTOLIC BLOOD PRESSURE: 52 MMHG | HEIGHT: 62 IN | TEMPERATURE: 98 F | BODY MASS INDEX: 26.31 KG/M2

## 2019-06-20 DIAGNOSIS — R10.9 ABDOMINAL PAIN, UNSPECIFIED ABDOMINAL LOCATION: Primary | ICD-10-CM

## 2019-06-20 LAB
ALBUMIN SERPL BCP-MCNC: 3.6 G/DL (ref 3.5–5.2)
ALP SERPL-CCNC: 48 U/L (ref 55–135)
ALT SERPL W/O P-5'-P-CCNC: 18 U/L (ref 10–44)
ANION GAP SERPL CALC-SCNC: 10 MMOL/L (ref 8–16)
AST SERPL-CCNC: 22 U/L (ref 10–40)
BASOPHILS # BLD AUTO: 0.04 K/UL (ref 0–0.2)
BASOPHILS NFR BLD: 0.6 % (ref 0–1.9)
BILIRUB SERPL-MCNC: 0.6 MG/DL (ref 0.1–1)
BILIRUB UR QL STRIP: NEGATIVE
BUN SERPL-MCNC: 14 MG/DL (ref 8–23)
CALCIUM SERPL-MCNC: 9.5 MG/DL (ref 8.7–10.5)
CHLORIDE SERPL-SCNC: 107 MMOL/L (ref 95–110)
CLARITY UR REFRACT.AUTO: CLEAR
CO2 SERPL-SCNC: 22 MMOL/L (ref 23–29)
COLOR UR AUTO: YELLOW
CREAT SERPL-MCNC: 0.8 MG/DL (ref 0.5–1.4)
CREAT SERPL-MCNC: 0.9 MG/DL (ref 0.5–1.4)
DIFFERENTIAL METHOD: ABNORMAL
EOSINOPHIL # BLD AUTO: 0.4 K/UL (ref 0–0.5)
EOSINOPHIL NFR BLD: 6.4 % (ref 0–8)
ERYTHROCYTE [DISTWIDTH] IN BLOOD BY AUTOMATED COUNT: 17.8 % (ref 11.5–14.5)
EST. GFR  (AFRICAN AMERICAN): >60 ML/MIN/1.73 M^2
EST. GFR  (NON AFRICAN AMERICAN): >60 ML/MIN/1.73 M^2
GLUCOSE SERPL-MCNC: 87 MG/DL (ref 70–110)
GLUCOSE UR QL STRIP: NEGATIVE
HCT VFR BLD AUTO: 31.2 % (ref 37–48.5)
HGB BLD-MCNC: 9.3 G/DL (ref 12–16)
HGB UR QL STRIP: NEGATIVE
IMM GRANULOCYTES # BLD AUTO: 0.01 K/UL (ref 0–0.04)
IMM GRANULOCYTES NFR BLD AUTO: 0.1 % (ref 0–0.5)
KETONES UR QL STRIP: NEGATIVE
LEUKOCYTE ESTERASE UR QL STRIP: NEGATIVE
LIPASE SERPL-CCNC: 145 U/L (ref 4–60)
LYMPHOCYTES # BLD AUTO: 1.7 K/UL (ref 1–4.8)
LYMPHOCYTES NFR BLD: 24.3 % (ref 18–48)
MCH RBC QN AUTO: 24.2 PG (ref 27–31)
MCHC RBC AUTO-ENTMCNC: 29.8 G/DL (ref 32–36)
MCV RBC AUTO: 81 FL (ref 82–98)
MONOCYTES # BLD AUTO: 0.7 K/UL (ref 0.3–1)
MONOCYTES NFR BLD: 10.7 % (ref 4–15)
NEUTROPHILS # BLD AUTO: 4 K/UL (ref 1.8–7.7)
NEUTROPHILS NFR BLD: 57.9 % (ref 38–73)
NITRITE UR QL STRIP: NEGATIVE
NRBC BLD-RTO: 0 /100 WBC
PH UR STRIP: 6 [PH] (ref 5–8)
PLATELET # BLD AUTO: 104 K/UL (ref 150–350)
PMV BLD AUTO: 10.8 FL (ref 9.2–12.9)
POTASSIUM SERPL-SCNC: 4.6 MMOL/L (ref 3.5–5.1)
PROT SERPL-MCNC: 6.7 G/DL (ref 6–8.4)
PROT UR QL STRIP: NEGATIVE
RBC # BLD AUTO: 3.85 M/UL (ref 4–5.4)
SAMPLE: NORMAL
SODIUM SERPL-SCNC: 139 MMOL/L (ref 136–145)
SP GR UR STRIP: 1.02 (ref 1–1.03)
URN SPEC COLLECT METH UR: NORMAL
WBC # BLD AUTO: 6.84 K/UL (ref 3.9–12.7)

## 2019-06-20 PROCEDURE — 25500020 PHARM REV CODE 255: Performed by: EMERGENCY MEDICINE

## 2019-06-20 PROCEDURE — 83690 ASSAY OF LIPASE: CPT

## 2019-06-20 PROCEDURE — 99284 PR EMERGENCY DEPT VISIT,LEVEL IV: ICD-10-PCS | Mod: ,,, | Performed by: EMERGENCY MEDICINE

## 2019-06-20 PROCEDURE — 82565 ASSAY OF CREATININE: CPT

## 2019-06-20 PROCEDURE — 99284 EMERGENCY DEPT VISIT MOD MDM: CPT | Mod: ,,, | Performed by: EMERGENCY MEDICINE

## 2019-06-20 PROCEDURE — 81003 URINALYSIS AUTO W/O SCOPE: CPT

## 2019-06-20 PROCEDURE — 85025 COMPLETE CBC W/AUTO DIFF WBC: CPT

## 2019-06-20 PROCEDURE — 99285 EMERGENCY DEPT VISIT HI MDM: CPT | Mod: 25

## 2019-06-20 PROCEDURE — 80053 COMPREHEN METABOLIC PANEL: CPT

## 2019-06-20 PROCEDURE — 63600175 PHARM REV CODE 636 W HCPCS: Performed by: EMERGENCY MEDICINE

## 2019-06-20 RX ORDER — HYDROMORPHONE HYDROCHLORIDE 1 MG/ML
1 INJECTION, SOLUTION INTRAMUSCULAR; INTRAVENOUS; SUBCUTANEOUS
Status: COMPLETED | OUTPATIENT
Start: 2019-06-20 | End: 2019-06-20

## 2019-06-20 RX ORDER — PSEUDOEPHEDRINE/ACETAMINOPHEN 30MG-500MG
100 TABLET ORAL
Status: DISCONTINUED | OUTPATIENT
Start: 2019-06-20 | End: 2019-06-20

## 2019-06-20 RX ORDER — SYRING-NEEDL,DISP,INSUL,0.3 ML 29 G X1/2"
296 SYRINGE, EMPTY DISPOSABLE MISCELLANEOUS
Status: DISCONTINUED | OUTPATIENT
Start: 2019-06-20 | End: 2019-06-20

## 2019-06-20 RX ADMIN — IOHEXOL 75 ML: 350 INJECTION, SOLUTION INTRAVENOUS at 01:06

## 2019-06-20 RX ADMIN — HYDROMORPHONE HYDROCHLORIDE 1 MG: 1 INJECTION, SOLUTION INTRAMUSCULAR; INTRAVENOUS; SUBCUTANEOUS at 11:06

## 2019-06-20 NOTE — ED PROVIDER NOTES
"Encounter Date: 2019    SCRIBE #1 NOTE: IClara, am scribing for, and in the presence of,  Dr. Orellana. I have scribed the entire note.   SCRIBE #2 NOTE: I, Britta العلي, am scribing for, and in the presence of,  Dr. Orellana . I have scribed the remaining portions of the note not scribed by Scribe #1.     History     Chief Complaint   Patient presents with    Fecal Impaction     states she feels like she has a blockage and 3 SBOs patient is nauseated. Pt appears to be pale     74 y.o woman with PMHx of HTN, HLD, GERD, DM, multiple SBOs with bowel resections, presents with a chief complaint of abdominal pain. Patient reports severe abdominal pain. Pain began this morning at approximately 5:00-6:00 am while at rest. Pain is located in periumbilical region with radiation to upper abdomen. Pain is severe and comes in waves. Currently there is mild improvement. Patient reports associated nausea but no vomiting. She reports that the pain is similar in quality to previous bowel "blockages" but different than pain associated with SBO. "Blockages" are typically relieve with a brown bomb enema. Patient reports BMs are somewhat harder since starting a new medication. No diarrhea, constipation, or dysuria. Patient has been instructed in the past by her surgeon to come to the ED should she develop similar symptoms. The difference with this time is that patient does not have any explicit constipation.    The history is provided by the patient and medical records.     Review of patient's allergies indicates:   Allergen Reactions    Codeine Nausea And Vomiting     Other reaction(s): Itching    Percocet  [oxycodone-acetaminophen]      Other reaction(s): Itching    Sulfa (sulfonamide antibiotics)      Other reaction(s): Nausea  Other reaction(s): Itching     Past Medical History:   Diagnosis Date    Abdominal pain     Allergic rhinitis     Arthritis     Blood transfusion     during delivery and     " Bowel obstruction     Cervical radiculopathy     followed by dr reynolds    Colon cancer     transverse colon; resected; Stage IIA (pT3 pN0 MX)    Diarrhea     Family history of breast cancer     Family history of colon cancer     Fatty liver     GERD (gastroesophageal reflux disease)     History of shingles     Hyperlipidemia     Hypothyroidism     Irritable bowel syndrome     Microscopic colitis     treated     Raynaud phenomenon     Raynaud's disease     Type 2 diabetes mellitus      Past Surgical History:   Procedure Laterality Date    APPENDECTOMY      BACK SURGERY      CARPAL TUNNEL RELEASE      bilateral      SECTION      CHOLECYSTECTOMY  1965    COLECTOMY  2011    Transverse colon resection by Dr. Aguirre    COLONOSCOPY N/A 2017    Performed by [facility]jit [facility]varez [facility] at [facility] ENDO (4TH FLR)    COLONOSCOPY N/A 2013    Performed by Angelo Reynolds MD at [facility] ENDO (4TH FLR)    EGD (ESOPHAGOGASTRODUODENOSCOPY) N/A 2018    Performed by nAgelo Reynolds MD at [facility] ENDO (2ND FLR)    EXPLORATORY-LAPAROTOMY N/A 2017    Performed by Herman Fletcher MD at [facility] OR 2ND FLR    EXPLORATORY-LAPAROTOMY N/A 2014    Performed by Gonzalez Silvestre MD at [facility] OR 2ND FLR    EYE SURGERY      Cataract Removal    HYSTERECTOMY      LYSIS-ADHESION N/A 2017    Performed by Herman Fletcher MD at [facility] OR 2ND FLR    LYSIS-ADHESION N/A 2014    Performed by Gonzalez [facility]vestre MD at [facility] OR 2ND FLR    PLACEMENT  2017    Performed by Herman Fletcher MD at [facility] OR 2ND FLR    posterolateral fusion with autograft bone and Bassett mineralized bone matrix  13    [facility] for [facility] stenosis    REPAIR  2017    Performed by Herman Fletcher MD at [facility] OR 2ND FLR    RESECTION - SMALL BOWEL N/A 2014    Performed by Gonzalez [facility]vestre MD at [facility] OR 2ND FLR    TOE SURGERY      TONSILLECTOMY      TRIGGER FINGER RELEASE       ULTRASOUND, UPPER GI TRACT, ENDOSCOPIC N/A 1/23/2019    Performed by Jose Hess [facility] at [facility] ENDO (2ND FLR)    ULTRASOUND, UPPER GI TRACT, ENDOSCOPIC N/A 12/12/2018    [facility] by [facility] at [facility]O     Family History   Problem Relation Age of Onset    Heart disease Father 50        Mi age 50    Colon cancer Father     Bladder Cancer Mother         non smoker    Cataracts Mother     Glaucoma Mother     Heart disease Mother     Hyperlipidemia Mother     Kidney disease Mother     Breast cancer Sister 79    Arthritis Daughter     Asthma Daughter     Depression Daughter     Hypertension Daughter     Stroke Daughter 40    Arthritis Brother     Colon cancer Brother 70    Alcohol abuse Brother     Parkinsonism Brother     Alcohol abuse Brother     Depression Daughter     Celiac disease Neg Hx     Cirrhosis Neg Hx     Colon polyps Neg Hx     Crohn's disease Neg Hx     Cystic fibrosis Neg Hx     Esophageal cancer Neg Hx     Hemochromatosis Neg Hx     Inflammatory bowel disease Neg Hx     Irritable bowel syndrome Neg Hx     Liver cancer Neg Hx     Liver disease Neg Hx     Rectal cancer Neg Hx     Stomach cancer Neg Hx     Ulcerative colitis Neg Hx     Eliazar's disease Neg Hx     Amblyopia Neg Hx     Blindness Neg Hx     Macular degeneration Neg Hx     Retinal detachment Neg Hx     Strabismus Neg Hx     Melanoma Neg Hx      Social History     Tobacco Use    Smoking status: Never Smoker    Smokeless tobacco: Never Used   Substance Use Topics    Alcohol use: Yes     Comment: socially, hardly ever    Drug use: No     Review of Systems   Constitutional: Negative for chills and fever.   HENT: Negative for rhinorrhea and sore throat.    Respiratory: Negative for shortness of breath.    Cardiovascular: Negative for chest pain.   Gastrointestinal: Positive for abdominal pain and nausea. Negative for constipation, diarrhea and vomiting.   Genitourinary: Negative for  dysuria.   Musculoskeletal: Negative for back pain.   Skin: Negative for rash.   Neurological: Negative for weakness.   Hematological: Does not bruise/bleed easily.       Physical Exam     Initial Vitals [06/20/19 0957]   BP Pulse Resp Temp SpO2   (!) 121/59 76 18 98.2 °F (36.8 °C) 97 %      MAP       --         Physical Exam    Nursing note and vitals reviewed.  Constitutional: She appears well-developed and well-nourished. She is not diaphoretic. No distress.   HENT:   Head: Normocephalic and atraumatic.   Mouth/Throat: Oropharynx is clear and moist.   Eyes: Conjunctivae and EOM are normal.   Neck: Normal range of motion. Neck supple. No JVD present.   Cardiovascular: Normal rate, regular rhythm, normal heart sounds and intact distal pulses. Exam reveals no gallop and no friction rub.    No murmur heard.  Pulmonary/Chest: Breath sounds normal. No respiratory distress. She has no wheezes. She has no rhonchi. She has no rales. She exhibits no tenderness.   Abdominal: Soft. She exhibits no distension and no mass. There is tenderness in the epigastric area and left upper quadrant. There is no rebound and no guarding.   Hypoactive bowel sounds.    Genitourinary: Rectal exam shows guaiac negative stool. Guaiac negative stool.   Genitourinary Comments: Scant stool in vault   Musculoskeletal: Normal range of motion. She exhibits no tenderness.   Lymphadenopathy:     She has no cervical adenopathy.   Neurological: She is alert and oriented to person, place, and time. She has normal strength. No sensory deficit.   Skin: Skin is warm and dry. Capillary refill takes less than 2 seconds.   Psychiatric: She has a normal mood and affect.         ED Course   Procedures  Labs Reviewed   CBC W/ AUTO DIFFERENTIAL - Abnormal; Notable for the following components:       Result Value    RBC 3.85 (*)     Hemoglobin 9.3 (*)     Hematocrit 31.2 (*)     Mean Corpuscular Volume 81 (*)     Mean Corpuscular Hemoglobin 24.2 (*)     Mean  Corpuscular Hemoglobin Conc 29.8 (*)     RDW 17.8 (*)     Platelets 104 (*)     All other components within normal limits   COMPREHENSIVE METABOLIC PANEL - Abnormal; Notable for the following components:    CO2 22 (*)     Alkaline Phosphatase 48 (*)     All other components within normal limits   LIPASE - Abnormal; Notable for the following components:    Lipase 145 (*)     All other components within normal limits   URINALYSIS, REFLEX TO URINE CULTURE    Narrative:     Preferred Collection Type->Urine, Clean Catch   ISTAT CREATININE          Imaging Results           CT Abdomen Pelvis With Contrast (Final result)  Result time 06/20/19 14:51:37    Final result by Teto Cronin MD (06/20/19 14:51:37)                 Impression:      In this patient with postoperative changes from prior brow resection, there are nodular foci of enhancement adjacent to anastomotic suture in the midline of the abdomen.  Findings are in nonspecific and could reflect ingested material, enhancing masses/polyps, or source of hemorrhage.  Recommend clinical correlation.    There is persistent fluid material noted throughout several loops of distended small bowel, as well as fluid and stool material within the colon.  Findings may reflect diarrheal disease.  No evidence of obstruction or inflammation.    Stable grade 2 anterolisthesis at L5-S1.    Additional findings as above.    This report was flagged in Epic as abnormal.    Electronically signed by resident: Teto Elizondo  Date:    06/20/2019  Time:    13:50    Electronically signed by: Teto Cronin MD  Date:    06/20/2019  Time:    14:51             Narrative:    EXAMINATION:  CT ABDOMEN PELVIS WITH CONTRAST    CLINICAL HISTORY:  Abdominal distension    TECHNIQUE:  Low dose axial images, sagittal and coronal reformations were obtained from the lung bases to the pubic symphysis following the IV administration of 75 mL of Omnipaque 350 .  Oral contrast was not  administered.    COMPARISON:  CT abdomen pelvis 05/10/2019    FINDINGS:  Abdomen:    - Lower thorax:Heart is normal in size, with no pericardial effusion.    - Lung bases: There is a thin band like opacity in the left lower lobe, favored to reflect plate atelectasis.  There is mild dependent atelectasis as well.    - Liver: No focal mass.    - Gallbladder: Status post cholecystectomy.    - Bile Ducts: No evidence of intra or extra hepatic biliary ductal dilation.    - Spleen: Negative.    - Kidneys: No mass or hydronephrosis.  Subcentimeter hypodensities, which are too small to fully characterize, and favored to reflect simple renal cysts bilaterally.    - Adrenals: Unremarkable.    - Pancreas: No mass or peripancreatic fat stranding.    - Retroperitoneum:  No significant adenopathy.    - Vascular: Moderate calcific atherosclerosis of the abdominal aorta.  No aneurysmal dilatation.    - Abdominal wall:  There is prominence of the inferior epigastric arteries bilaterally within the anterior abdominal wall.    Pelvis:    The urinary bladder is distended with urine.  A pessary device is present.  No abnormal masses are visualized within the pelvis.    Bowel/Mesentery:    Postsurgical changes from prior bowel resections.  There are several loops of prominent small bowel, and fluid noted throughout the colon.  Findings may reflect diarrheal process.  There are foci of enhancement seen within the bowel adjacent to anastomotic suture line (axial series 2, image 55 and axial series 6, image 55), which are nonspecific.  These findings may reflect enhancing masses or polyps adjacent to the anastomotic suture line.  Underlying hemorrhage is also not excluded.  Recommend clinical correlation..  Previously noted loop adjacent to the anastomosis containing significant amount of stool is less stool distended than previous with persistent mild distension and bowel wall thickening.  This may represent postoperative change    Bones:   No acute osseous abnormality and no suspicious lytic or blastic lesion.  Bilateral pars defects at L5, stable grade 2 anterolisthesis.                                 Medical Decision Making:   History:   Old Medical Records: I decided to obtain old medical records.  Initial Assessment:   74 y.o woman with PMHx of HTN, HLD, GERD, DM, multiple SBOs with bowel resections, presents with a chief complaint of abdominal pain.  Differential Diagnosis:   This patient's differential diagnosis includes, but is not limited to: gas pains, constipation, SBO    Independently Interpreted Test(s):   I have ordered and independently interpreted X-rays - see prior notes.  Clinical Tests:   Lab Tests: Ordered and Reviewed  Radiological Study: Ordered and Reviewed  ED Management:  Will administer analgesics and IV fluids. Will order labs and CT A/P.    Reassessment:  CBC without leukocytosis. Hemoglobin at 9.3 which is somewhat down from baseline of 10. CMP normal. Lipase elevated at 145.  UA clear.  CT reviewed.  On reassessment, patient remains well appearing.  Her abdomen is soft at this time.  I see no indication for emergent brown bomb enema this time.  Through shared decision making, the patient elects to try laxatives at home to encourage bowel movement.  Provided with extensive return precautions.            Scribe Attestation:   Scribe #1: I performed the above scribed service and the documentation accurately describes the services I performed. I attest to the accuracy of the note.  Scribe #2: I performed the above scribed service and the documentation accurately describes the services I performed. I attest to the accuracy of the note.    Attending Attestation:           Physician Attestation for Scribe:      Comments: I, Dr. Mor Orellana, personally performed the services described in this documentation. All medical record entries made by the scribe were at my direction and in my presence.  I have reviewed the chart and agree  that the record reflects my personal performance and is accurate and complete. Mor Orellana MD.  6:20 AM 06/21/2019                 Clinical Impression:       ICD-10-CM ICD-9-CM   1. Abdominal pain, unspecified abdominal location R10.9 789.00                                Mor Orellana MD  06/21/19 0621

## 2019-06-20 NOTE — ED NOTES
Patient identifiers verified and correct for June Tucei  LOC: The patient is awake, alert and aware of environment with an appropriate affect, the patient is oriented x 3 and speaking appropriately.   APPEARANCE: Patient appears comfortable and in no acute distress, patient is clean and well groomed.  SKIN: The skin is warm and dry, color consistent with ethnicity, patient has normal skin turgor and moist mucus membranes, skin intact, no breakdown or bruising noted.   MUSCULOSKELETAL: Patient moving all extremities spontaneously, no swelling noted.  RESPIRATORY: Airway is open and patent, respirations are spontaneous, patient has a normal effort and rate, no accessory muscle use noted, pt placed on continuous pulse ox with O2 sats noted at 97% on room air.  CARDIAC: Pt placed on cardiac monitor. Patient has a normal rate and regular rhythm, no edema noted, capillary refill < 3 seconds.   GASTRO: Soft and tender to palpation, some distention noted, normoactive bowel sounds present in all four quadrants. Pt states bowel movements have been regular. Pt reports severe abdominal pain since 0500 this morning.  : Pt denies any pain or frequency with urination.  NEURO: Pt opens eyes spontaneously, behavior appropriate to situation, follows commands, facial expression symmetrical, bilateral hand grasp equal and even, purposeful motor response noted, normal sensation in all extremities when touched with a finger.

## 2019-06-20 NOTE — TELEPHONE ENCOUNTER
----- Message from Gifty Bryant sent at 6/20/2019  9:44 AM CDT -----  Contact: Self- [phone]  Alpesh- pt called to speak with staff- states she is going to the ED- believes she has another blockage in her intestines- abdominal pain and nauseated as well- please contact pt at [phone]

## 2019-06-20 NOTE — DISCHARGE INSTRUCTIONS
Try taking a laxative at home to encourage bowel movement.  If you pain worsens, you are unable tolerate food by mouth, or other symptoms, return to the ED.

## 2019-06-26 ENCOUNTER — TELEPHONE (OUTPATIENT)
Dept: ENDOSCOPY | Facility: HOSPITAL | Age: 74
End: 2019-06-26

## 2019-06-26 ENCOUNTER — ANESTHESIA EVENT (OUTPATIENT)
Dept: ENDOSCOPY | Facility: HOSPITAL | Age: 74
End: 2019-06-26
Payer: MEDICARE

## 2019-06-26 ENCOUNTER — PATIENT MESSAGE (OUTPATIENT)
Dept: ENDOSCOPY | Facility: HOSPITAL | Age: 74
End: 2019-06-26

## 2019-06-26 ENCOUNTER — ANESTHESIA (OUTPATIENT)
Dept: ENDOSCOPY | Facility: HOSPITAL | Age: 74
End: 2019-06-26
Payer: MEDICARE

## 2019-06-26 ENCOUNTER — HOSPITAL ENCOUNTER (OUTPATIENT)
Facility: HOSPITAL | Age: 74
Discharge: HOME | End: 2019-06-26
Attending: INTERNAL MEDICINE | Admitting: INTERNAL MEDICINE
Payer: MEDICARE

## 2019-06-26 VITALS
BODY MASS INDEX: 25.76 KG/M2 | TEMPERATURE: 98 F | SYSTOLIC BLOOD PRESSURE: 140 MMHG | HEART RATE: 67 BPM | HEIGHT: 62 IN | WEIGHT: 140 LBS | OXYGEN SATURATION: 100 % | RESPIRATION RATE: 18 BRPM | DIASTOLIC BLOOD PRESSURE: 61 MMHG

## 2019-06-26 DIAGNOSIS — R19.7 DIARRHEA, UNSPECIFIED TYPE: ICD-10-CM

## 2019-06-26 LAB — POCT GLUCOSE: 129 MG/DL (ref 70–110)

## 2019-06-26 PROCEDURE — 25000003 PHARM REV CODE 250: Performed by: NURSE ANESTHETIST, CERTIFIED REGISTERED

## 2019-06-26 PROCEDURE — 43239 EGD BIOPSY SINGLE/MULTIPLE: CPT | Performed by: INTERNAL MEDICINE

## 2019-06-26 PROCEDURE — 82962 GLUCOSE BLOOD TEST: CPT | Performed by: INTERNAL MEDICINE

## 2019-06-26 PROCEDURE — 37000009 HC ANESTHESIA EA ADD 15 MINS: Performed by: INTERNAL MEDICINE

## 2019-06-26 PROCEDURE — 25000003 PHARM REV CODE 250: Performed by: INTERNAL MEDICINE

## 2019-06-26 PROCEDURE — 27201012 HC FORCEPS, HOT/COLD, DISP: Performed by: INTERNAL MEDICINE

## 2019-06-26 PROCEDURE — 88305 TISSUE EXAM BY PATHOLOGIST: CPT | Mod: 59 | Performed by: PATHOLOGY

## 2019-06-26 PROCEDURE — 45385 COLONOSCOPY W/LESION REMOVAL: CPT | Performed by: INTERNAL MEDICINE

## 2019-06-26 PROCEDURE — 45385 COLONOSCOPY W/LESION REMOVAL: CPT | Mod: ,,, | Performed by: INTERNAL MEDICINE

## 2019-06-26 PROCEDURE — 43239 PR EGD, FLEX, W/BIOPSY, SGL/MULTI: ICD-10-PCS | Mod: 51,,, | Performed by: INTERNAL MEDICINE

## 2019-06-26 PROCEDURE — 43239 EGD BIOPSY SINGLE/MULTIPLE: CPT | Mod: 51,,, | Performed by: INTERNAL MEDICINE

## 2019-06-26 PROCEDURE — E9220 PRA ENDO ANESTHESIA: ICD-10-PCS | Mod: ,,, | Performed by: NURSE ANESTHETIST, CERTIFIED REGISTERED

## 2019-06-26 PROCEDURE — 45385 PR COLONOSCOPY,REMV LESN,SNARE: ICD-10-PCS | Mod: ,,, | Performed by: INTERNAL MEDICINE

## 2019-06-26 PROCEDURE — 27201089 HC SNARE, DISP (ANY): Performed by: INTERNAL MEDICINE

## 2019-06-26 PROCEDURE — E9220 PRA ENDO ANESTHESIA: HCPCS | Mod: ,,, | Performed by: NURSE ANESTHETIST, CERTIFIED REGISTERED

## 2019-06-26 PROCEDURE — 63600175 PHARM REV CODE 636 W HCPCS: Performed by: NURSE ANESTHETIST, CERTIFIED REGISTERED

## 2019-06-26 PROCEDURE — 88305 TISSUE SPECIMEN TO PATHOLOGY - SURGERY: ICD-10-PCS | Mod: 26,,, | Performed by: PATHOLOGY

## 2019-06-26 PROCEDURE — 37000008 HC ANESTHESIA 1ST 15 MINUTES: Performed by: INTERNAL MEDICINE

## 2019-06-26 RX ORDER — SODIUM CHLORIDE 0.9 % (FLUSH) 0.9 %
10 SYRINGE (ML) INJECTION
Status: DISCONTINUED | OUTPATIENT
Start: 2019-06-26 | End: 2019-06-26 | Stop reason: HOSPADM

## 2019-06-26 RX ORDER — PROPOFOL 10 MG/ML
INJECTION, EMULSION INTRAVENOUS CONTINUOUS PRN
Status: DISCONTINUED | OUTPATIENT
Start: 2019-06-26 | End: 2019-06-26

## 2019-06-26 RX ORDER — LIDOCAINE HCL/PF 100 MG/5ML
SYRINGE (ML) INTRAVENOUS
Status: DISCONTINUED | OUTPATIENT
Start: 2019-06-26 | End: 2019-06-26

## 2019-06-26 RX ORDER — SODIUM CHLORIDE 9 MG/ML
INJECTION, SOLUTION INTRAVENOUS CONTINUOUS
Status: DISCONTINUED | OUTPATIENT
Start: 2019-06-26 | End: 2019-06-26 | Stop reason: HOSPADM

## 2019-06-26 RX ORDER — GLYCOPYRROLATE 0.2 MG/ML
INJECTION INTRAMUSCULAR; INTRAVENOUS
Status: DISCONTINUED | OUTPATIENT
Start: 2019-06-26 | End: 2019-06-26

## 2019-06-26 RX ORDER — PROPOFOL 10 MG/ML
INJECTION, EMULSION INTRAVENOUS
Status: DISCONTINUED | OUTPATIENT
Start: 2019-06-26 | End: 2019-06-26

## 2019-06-26 RX ADMIN — PROPOFOL 200 MCG/KG/MIN: 10 INJECTION, EMULSION INTRAVENOUS at 02:06

## 2019-06-26 RX ADMIN — SODIUM CHLORIDE: 0.9 INJECTION, SOLUTION INTRAVENOUS at 02:06

## 2019-06-26 RX ADMIN — LIDOCAINE HYDROCHLORIDE 60 MG: 20 INJECTION, SOLUTION INTRAVENOUS at 02:06

## 2019-06-26 RX ADMIN — SODIUM CHLORIDE: 0.9 INJECTION, SOLUTION INTRAVENOUS at 03:06

## 2019-06-26 RX ADMIN — PROPOFOL 70 MG: 10 INJECTION, EMULSION INTRAVENOUS at 02:06

## 2019-06-26 RX ADMIN — PROPOFOL 30 MG: 10 INJECTION, EMULSION INTRAVENOUS at 03:06

## 2019-06-26 RX ADMIN — GLYCOPYRROLATE 0.2 MG: 0.2 INJECTION, SOLUTION INTRAMUSCULAR; INTRAVENOUS at 02:06

## 2019-06-26 NOTE — ANESTHESIA PREPROCEDURE EVALUATION
06/26/2019  Anayeli Judd is a 74 y.o., female.    Pre-op Assessment    I have reviewed the Patient Summary Reports.     I have reviewed the Nursing Notes.   I have reviewed the Medications.     Review of Systems  Anesthesia Hx:  No problems with previous Anesthesia  History of prior surgery of interest to airway management or planning: Denies Family Hx of Anesthesia complications.    Social:  No Alcohol Use    Cardiovascular:   Exercise tolerance: good  Denies Angina.    Pulmonary:  Pulmonary Normal    Renal/:   Chronic Renal Disease    Hepatic/GI:   Bowel Prep. GERD, well controlled Liver Disease,    Musculoskeletal:   Arthritis     Neurological:   Neuromuscular Disease, Headaches    Endocrine:   Diabetes, well controlled Hypothyroidism        Physical Exam  General:  Well nourished    Airway/Jaw/Neck:  Airway Findings: Mouth Opening: Normal Tongue: Normal  General Airway Assessment: Adult  Mallampati: II  TM Distance: 4 - 6 cm      Dental:  Dental Findings:    Chest/Lungs:  Chest/Lungs Findings: Clear to auscultation, Normal Respiratory Rate     Heart/Vascular:  Heart Findings: Rate: Normal  Rhythm: Regular Rhythm        Mental Status:  Mental Status Findings:  Cooperative, Alert and Oriented         Anesthesia Plan  Type of Anesthesia, risks & benefits discussed:  Anesthesia Type:  general  Patient's Preference:   Intra-op Monitoring Plan: standard ASA monitors  Intra-op Monitoring Plan Comments:   Post Op Pain Control Plan: IV/PO Opioids PRN  Post Op Pain Control Plan Comments:   Induction:   IV  Beta Blocker:  Patient is not currently on a Beta-Blocker (No further documentation required).       Informed Consent: Patient understands risks and agrees with Anesthesia plan.  Questions answered. Anesthesia consent signed with patient.  ASA Score: 2     Day of Surgery Review of History & Physical:     H&P update referred to the surgeon.         Ready For Surgery From Anesthesia Perspective.

## 2019-06-26 NOTE — [employer] PATIENT INSTRUCTIONS
Discharge Summary/Instructions after an Endoscopic Procedure  Patient Name: Anayeli Judd  Patient MRN: [id number]  Patient YOB: 1945 Wednesday, June 26, 2019  Ramiro Jefferson MD  RESTRICTIONS:  During your procedure today, you received medications for sedation.  These   medications may affect your judgment, balance and coordination.  Therefore,   for 24 hours, you have the following restrictions:   - DO NOT drive a car, operate machinery, make legal/financial decisions,   sign important papers or drink alcohol.    ACTIVITY:  Today: no heavy lifting, straining or running due to procedural   sedation/anesthesia.  The following day: return to full activity including work.  DIET:  Eat and drink normally unless instructed otherwise.     TREATMENT FOR COMMON SIDE EFFECTS:  - Mild abdominal pain, nausea, belching, bloating or excessive gas:  rest,   eat lightly and use a heating pad.  - Sore Throat: treat with throat lozenges and/or gargle with warm salt   water.  - Because air was used during the procedure, expelling large amounts of air   from your rectum or belching is normal.  - If a bowel prep was taken, you may not have a bowel movement for 1-3 days.    This is normal.  SYMPTOMS TO WATCH FOR AND REPORT TO YOUR PHYSICIAN:  1. Abdominal pain or bloating, other than gas cramps.  2. Chest pain.  3. Back pain.  4. Signs of infection such as: chills or fever occurring within 24 hours   after the procedure.  5. Rectal bleeding, which would show as bright red, maroon, or black stools.   (A tablespoon of blood from the rectum is not serious, especially if   hemorrhoids are present.)  6. Vomiting.  7. Weakness or dizziness.  GO DIRECTLY TO THE NEAREST EMERGENCY ROOM IF YOU HAVE ANY OF THE FOLLOWING:      Difficulty breathing              Chills and/or fever over 101 F   Persistent vomiting and/or vomiting blood   Severe abdominal pain   Severe chest pain   Black, tarry stools   Bleeding- more than one  tablespoon   Any other symptom or condition that you feel may need urgent attention  Your doctor recommends these additional instructions:  If any biopsies were taken, your doctors clinic will contact you in 1 to 2   weeks with any results.  - Discharge patient to home.   - Repeat colonoscopy in 3 - 5 years for surveillance based on pathology   results.   - Telephone endoscopist for pathology results in 2 weeks.   - Telephone referring physician for study results in 2 weeks.   - Return to primary care physician.   - Consider avoiding all non-steroidal anti-inflammatory drugs (aspirin,   ibuprofen, naproxen, etc.), unless needed for cardiovascular protection.    Recommend you discuss with your prescribing doctor (of your aspirin) to see   if cardiovascular benefits of your aspirin outweigh the risks of GI   bleeding.  For questions, problems or results please call your physician - Ramiro Jefferson MD at Work:  [phone].  [facility], EMERGENCY ROOM PHONE NUMBER: [phone]  IF A COMPLICATION OR EMERGENCY SITUATION ARISES AND YOU ARE UNABLE TO REACH   YOUR PHYSICIAN - GO DIRECTLY TO THE EMERGENCY ROOM.  Ramiro Jefferson MD  6/26/2019 3:52:36 PM  This report has been verified and signed electronically.  [employer]

## 2019-06-26 NOTE — TRANSFER OF CARE
"Anesthesia Transfer of Care Note    Patient: Anayeli Judd    Procedure(s) Performed: Procedure(s) (LRB):  EGD (ESOPHAGOGASTRODUODENOSCOPY) (N/A)  COLONOSCOPY (N/A)    Patient location: GI    Anesthesia Type: general    Transport from OR: Transported from OR on room air with adequate spontaneous ventilation    Post pain: adequate analgesia    Post assessment: no apparent anesthetic complications and tolerated procedure well    Post vital signs: stable    Level of consciousness: awake    Nausea/Vomiting: no nausea/vomiting    Complications: none    Transfer of care protocol was followed      Last vitals:   Visit Vitals  BP (!) 102/56 (BP Location: Left arm, Patient Position: Sitting)   Pulse 73   Temp 36.4 °C (97.5 °F) (Temporal)   Resp 16   Ht 5' 2" (1.575 m)   Wt 63.5 kg (140 lb)   LMP  (LMP Unknown)   SpO2 99%   Breastfeeding? No   BMI 25.61 kg/m²     "

## 2019-06-26 NOTE — PROVATION PATIENT INSTRUCTIONS
Discharge Summary/Instructions after an Endoscopic Procedure  Patient Name: Anayeli Judd  Patient MRN: [id number]  Patient YOB: 1945 Wednesday, June 26, 2019  Ramiro Jefferson MD  RESTRICTIONS:  During your procedure today, you received medications for sedation.  These   medications may affect your judgment, balance and coordination.  Therefore,   for 24 hours, you have the following restrictions:   - DO NOT drive a car, operate machinery, make legal/financial decisions,   sign important papers or drink alcohol.    ACTIVITY:  Today: no heavy lifting, straining or running due to procedural   sedation/anesthesia.  The following day: return to full activity including work.  DIET:  Eat and drink normally unless instructed otherwise.     TREATMENT FOR COMMON SIDE EFFECTS:  - Mild abdominal pain, nausea, belching, bloating or excessive gas:  rest,   eat lightly and use a heating pad.  - Sore Throat: treat with throat lozenges and/or gargle with warm salt   water.  - Because air was used during the procedure, expelling large amounts of air   from your rectum or belching is normal.  - If a bowel prep was taken, you may not have a bowel movement for 1-3 days.    This is normal.  SYMPTOMS TO WATCH FOR AND REPORT TO YOUR PHYSICIAN:  1. Abdominal pain or bloating, other than gas cramps.  2. Chest pain.  3. Back pain.  4. Signs of infection such as: chills or fever occurring within 24 hours   after the procedure.  5. Rectal bleeding, which would show as bright red, maroon, or black stools.   (A tablespoon of blood from the rectum is not serious, especially if   hemorrhoids are present.)  6. Vomiting.  7. Weakness or dizziness.  GO DIRECTLY TO THE NEAREST EMERGENCY ROOM IF YOU HAVE ANY OF THE FOLLOWING:      Difficulty breathing              Chills and/or fever over 101 F   Persistent vomiting and/or vomiting blood   Severe abdominal pain   Severe chest pain   Black, tarry stools   Bleeding- more than one  tablespoon   Any other symptom or condition that you feel may need urgent attention  Your doctor recommends these additional instructions:  If any biopsies were taken, your doctors clinic will contact you in 1 to 2   weeks with any results.  - Discharge patient to home.   - Follow an antireflux regimen.   - Await pathology results.   - Telephone endoscopist for pathology results in 2 weeks.   - Repeat upper endoscopy in 3 years for surveillance based on pathology   results.   - Consider avoiding all non-steroidal anti-inflammatory drugs (Mobic,   ibuprofen, naproxen, etc.), unless needed for cardiovascular protection.    Recommend you discuss with your prescribing doctor (of your aspirin) to see   if cardiovascular benefits of your aspirin outweigh the risks of GI   bleeding. O.K. to take Aspirin if needed for Cardiovascular protection.  - The findings and recommendations were discussed with the patient.   - The findings and recommendations were discussed with the patient's primary   physician.   - Return to primary care physician.  For questions, problems or results please call your physician - Ramiro Jefferson MD at Work:  [phone].  [facility], EMERGENCY ROOM PHONE NUMBER: [phone]  IF A COMPLICATION OR EMERGENCY SITUATION ARISES AND YOU ARE UNABLE TO REACH   YOUR PHYSICIAN - GO DIRECTLY TO THE EMERGENCY ROOM.  Ramiro Jefferson MD  6/26/2019 3:55:04 PM  This report has been verified and signed electronically.  [employer]

## 2019-06-26 NOTE — DISCHARGE INSTRUCTIONS

## 2019-06-26 NOTE — H&P
[facility]  History & Physical    Subjective:      Chief Complaint/Reason for Admission:    EGD and colonoscopy for follow up PUD and diarrhea and history of colon adenoma.    Anayeli Judd is a 74 y.o. female.    Past Medical History:   Diagnosis Date    Abdominal pain     Allergic rhinitis     Arthritis     Blood transfusion     during delivery and     Bowel obstruction     Cervical radiculopathy     followed by dr reynolds    Colon cancer     transverse colon; resected; Stage IIA (pT3 pN0 MX)    Diarrhea     Family history of breast cancer     Family history of colon cancer     Fatty liver     GERD (gastroesophageal reflux disease)     History of shingles     Hyperlipidemia     Hypothyroidism     Irritable bowel syndrome     Microscopic colitis     treated     Raynaud phenomenon     Raynaud's disease     Type 2 diabetes mellitus      Past Surgical History:   Procedure Laterality Date    APPENDECTOMY      BACK SURGERY      CARPAL TUNNEL RELEASE      bilateral      SECTION      CHOLECYSTECTOMY  1965    COLECTOMY  2011    Transverse colon resection by Dr. Aguirre    COLONOSCOPY N/A 2017    Performed by [facility]jit [facility]varez [facility] at [facility] ENDO (4TH FLR)    COLONOSCOPY N/A 2013    Performed by Angelo Reynolds MD at [facility] ENDO (4TH FLR)    EGD (ESOPHAGOGASTRODUODENOSCOPY) N/A 2018    Performed by Angelo Reynolds MD at [facility] ENDO (2ND FLR)    EXPLORATORY-LAPAROTOMY N/A 2017    Performed by Herman Fletcher MD at [facility] OR 2ND FLR    EXPLORATORY-LAPAROTOMY N/A 2014    Performed by Gonzalez Silvestre MD at [facility] OR 2ND FLR    EYE SURGERY      Cataract Removal    HYSTERECTOMY      LYSIS-ADHESION N/A 2017    Performed by Herman Fletcher [facility] at [facility] OR 2ND FLR    LYSIS-ADHESION N/A 2014    Performed by Gonzalez [facility]re MD at [facility] OR 2ND FLR    PLACEMENT  2017    Performed by Herman Fletcher MD at [facility]  OR 2ND FLR    posterolateral fusion with autograft bone and Eun mineralized bone matrix  2/1/13    at [facility] for lumbar spine stenosis    REPAIR  4/1/2017    [facility] at [facility] 2ND FLR    RESECTION - SMALL BOWEL N/A 9/2/2014    Performed by [facility] at [facility] 2ND FLR    TOE SURGERY      TONSILLECTOMY      TRIGGER FINGER RELEASE      ULTRASOUND, UPPER GI TRACT, ENDOSCOPIC N/A 1/23/2019    Performed by Jose Hess MD at [facility] ENDO (2ND FLR)    ULTRASOUND, UPPER GI TRACT, ENDOSCOPIC N/A 12/12/2018    [facility]     Family History   Problem Relation Age of Onset    Heart disease Father 50        Mi age 50    Colon cancer Father     Bladder Cancer Mother         non smoker    Cataracts Mother     Glaucoma Mother     Heart disease Mother     Hyperlipidemia Mother     Kidney disease Mother     Breast cancer Sister 79    Arthritis Daughter     Asthma Daughter     Depression Daughter     Hypertension Daughter     Stroke Daughter 40    Arthritis Brother     Colon cancer Brother 70    Alcohol abuse Brother     Parkinsonism Brother     Alcohol abuse Brother     Depression Daughter     Celiac disease Neg Hx     Cirrhosis Neg Hx     Colon polyps Neg Hx     Crohn's disease Neg Hx     Cystic fibrosis Neg Hx     Esophageal cancer Neg Hx     Hemochromatosis Neg Hx     Inflammatory bowel disease Neg Hx     Irritable bowel syndrome Neg Hx     Liver cancer Neg Hx     Liver disease Neg Hx     Rectal cancer Neg Hx     Stomach cancer Neg Hx     Ulcerative colitis Neg Hx     Eliazar's disease Neg Hx     Amblyopia Neg Hx     Blindness Neg Hx     Macular degeneration Neg Hx     Retinal detachment Neg Hx     Strabismus Neg Hx     Melanoma Neg Hx      Social History     Tobacco Use    Smoking status: Never Smoker    Smokeless tobacco: Never Used   Substance Use Topics    Alcohol use: Yes     Comment:  socially, hardly ever    Drug use: No       Facility-Administered Medications Prior to Admission   Medication    amoxicillin capsule 500 mg     PTA Medications   Medication Sig    atorvastatin (LIPITOR) 40 MG tablet TAKE 1 TABLET(40 MG) BY MOUTH EVERY DAY    cephALEXin (KEFLEX) 250 MG capsule Take 1 capsule (250 mg total) by mouth once daily.    docusate sodium (COLACE) 100 MG capsule Take 1 capsule (100 mg total) by mouth 2 (two) times daily.    fluticasone (FLONASE) 50 mcg/actuation nasal spray 2 sprays by Each Nare route daily as needed.     gabapentin (NEURONTIN) 600 MG tablet Take 1,200 mg by mouth 2 (two) times daily.     Lactobacillus rhamnosus GG (CULTURELLE) 10 billion cell capsule Take 1 capsule by mouth once daily.    levothyroxine (SYNTHROID) 100 MCG tablet TAKE 1 TABLET BY MOUTH EVERY MORNING    linagliptin (TRADJENTA) 5 mg Tab tablet Take 1 tablet (5 mg total) by mouth once daily.    magnesium 30 mg Tab Take 500 capsules by mouth. Patient's taking magnesium 500mg QD    meloxicam (MOBIC) 15 MG tablet TAKE 1 TABLET BY MOUTH DAILY WITH FOOD AS NEEDED FOR PAIN OR ARTHRITIS    metFORMIN (GLUCOPHAGE-XR) 750 MG 24 hr tablet Take 1 tablet (750 mg total) by mouth 2 (two) times daily with meals.    mirabegron 50 mg Tb24 Take 1 tablet (50 mg total) by mouth once daily.    nitrofurantoin, macrocrystal-monohydrate, (MACROBID) 100 MG capsule Take 1 capsule (100 mg total) by mouth 2 (two) times daily.    ondansetron (ZOFRAN) 4 MG tablet Take 1 tablet (4 mg total) by mouth every 8 (eight) hours as needed for Nausea.    ospemifene (OSPHENA) 60 mg Tab Take 60 mg by mouth once daily.    pantoprazole (PROTONIX) 40 MG tablet TAKE 1 TABLET BY MOUTH ONCE DAILY    polyethylene glycol (GLYCOLAX) 17 gram/dose powder Take 17 g by mouth once daily.    VIT A/VIT C/VIT E/ZINC/COPPER (ICAPS AREDS ORAL) Take by mouth 2 (two) times daily.    vitamin D 1000 units Tab Take 185 mg by mouth once daily. D3     azelastine (ASTELIN) 137 mcg nasal spray 1 spray (137 mcg total) by Nasal route 2 (two) times daily.    betamethasone dipropionate (DIPROLENE) 0.05 % ointment Apply every Am x 2 weeks then twice weekly    blood sugar diagnostic Strp Dispense madie contour strips; test blood sugar once daily    blood-glucose meter (CONTOUR METER) kit Please dispense Madie Contour meter and supplies Use as instructed Test Blood sugar once daily    cholestyramine-aspartame (QUESTRAN/PREVALITE LIGHT) 4 gram Powd Take 4 g by mouth once daily.    conjugated estrogens (PREMARIN) vaginal cream Place 0.5 g vaginally every evening.    diphenhydrAMINE (BENADRYL) 25 mg capsule Take 1 each (25 mg total) by mouth nightly as needed for Itching, Allergies or Insomnia.    FREESTYLE EFREN READER Misc TEST as directed    FREESTYLE EFREN SENSOR Kit     hydrocortisone 2.5 % cream     lancets (ONE TOUCH ULTRASOFT LANCETS) Misc Test blood sugar once daily    SHINGRIX, PF, 50 mcg/0.5 mL injection     triamcinolone acetonide 0.1% (KENALOG) 0.1 % paste apply to affected area with A Q-TIP three times a day     Review of patient's allergies indicates:   Allergen Reactions    Codeine Nausea And Vomiting     Other reaction(s): Itching    Percocet  [oxycodone-acetaminophen]      Other reaction(s): Itching    Sulfa (sulfonamide antibiotics)      Other reaction(s): Nausea  Other reaction(s): Itching        Review of Systems   Constitutional: Negative for chills, fever and weight loss.   Respiratory: Negative for shortness of breath and wheezing.    Cardiovascular: Negative for chest pain.   Gastrointestinal: Positive for diarrhea.       Objective:      Vital Signs (Most Recent)  Temp: 97.9 °F (36.6 °C) (06/26/19 1441)  Pulse: 96 (06/26/19 1441)  Resp: 18 (06/26/19 1441)  BP: (!) 141/62 (06/26/19 1441)  SpO2: 95 % (06/26/19 1441)    Vital Signs Range (Last 24H):  Temp:  [97.9 °F (36.6 °C)]   Pulse:  [96]   Resp:  [18]   BP: (141)/(62)   SpO2:  [95 %]      Physical Exam   Constitutional: She is oriented to person, place, and time. She appears well-developed and well-nourished.   Cardiovascular: Normal rate.   Pulmonary/Chest: Effort normal.   Abdominal: Soft.   Neurological: She is alert and oriented to person, place, and time.   Skin: Skin is warm and dry.   Psychiatric: She has a normal mood and affect. Her behavior is normal. Judgment and thought content normal.           Assessment:      Active Hospital Problems    Diagnosis  POA    Diarrhea [R19.7]  Yes      Resolved Hospital Problems   No resolved problems to display.       Plan:    EGD and colonoscopy for follow up PUD and diarrhea and history of colon adenoma.

## 2019-06-27 NOTE — ANESTHESIA POSTPROCEDURE EVALUATION
Anesthesia Post Evaluation    Patient: Anayeli Hardik Judd    Procedure(s) Performed: Procedure(s) (LRB):  EGD (ESOPHAGOGASTRODUODENOSCOPY) (N/A)  COLONOSCOPY (N/A)    Final Anesthesia Type: general  Patient location during evaluation: PACU  Patient participation: Yes- Able to Participate  Level of consciousness: awake and alert  Post-procedure vital signs: reviewed and stable  Pain management: adequate  Airway patency: patent  PONV status at discharge: No PONV  Anesthetic complications: no      Cardiovascular status: blood pressure returned to baseline and stable  Respiratory status: unassisted  Hydration status: euvolemic  Follow-up not needed.          Vitals Value Taken Time   /61 6/26/2019  4:20 PM   Temp 36.4 °C (97.5 °F) 6/26/2019  3:50 PM   Pulse 67 6/26/2019  4:20 PM   Resp 18 6/26/2019  4:20 PM   SpO2 100 % 6/26/2019  4:20 PM         Event Time     Out of Recovery 16:21:00          Pain/Roscoe Score: Roscoe Score: 9 (6/26/2019  3:50 PM)

## 2019-07-01 ENCOUNTER — TELEPHONE (OUTPATIENT)
Dept: UROGYNECOLOGY | Facility: CLINIC | Age: 74
End: 2019-07-01

## 2019-07-01 ENCOUNTER — OFFICE VISIT (OUTPATIENT)
Dept: UROGYNECOLOGY | Facility: CLINIC | Age: 74
End: 2019-07-01
Payer: MEDICARE

## 2019-07-01 VITALS
WEIGHT: 146.81 LBS | DIASTOLIC BLOOD PRESSURE: 60 MMHG | HEIGHT: 62 IN | SYSTOLIC BLOOD PRESSURE: 112 MMHG | BODY MASS INDEX: 27.02 KG/M2

## 2019-07-01 DIAGNOSIS — S30.814A ABRASION OF VAGINA, INITIAL ENCOUNTER: ICD-10-CM

## 2019-07-01 DIAGNOSIS — N39.0 FREQUENT UTI: ICD-10-CM

## 2019-07-01 DIAGNOSIS — Z46.89 PESSARY MAINTENANCE: ICD-10-CM

## 2019-07-01 DIAGNOSIS — N95.2 VAGINAL ATROPHY: ICD-10-CM

## 2019-07-01 DIAGNOSIS — N90.89 VULVAR IRRITATION: ICD-10-CM

## 2019-07-01 DIAGNOSIS — N39.46 MIXED STRESS AND URGE URINARY INCONTINENCE: Primary | ICD-10-CM

## 2019-07-01 PROCEDURE — 99213 PR OFFICE/OUTPT VISIT, EST, LEVL III, 20-29 MIN: ICD-10-PCS | Mod: S$PBB,,, | Performed by: NURSE PRACTITIONER

## 2019-07-01 PROCEDURE — 99215 OFFICE O/P EST HI 40 MIN: CPT | Mod: PBBFAC | Performed by: NURSE PRACTITIONER

## 2019-07-01 PROCEDURE — 99999 PR PBB SHADOW E&M-EST. PATIENT-LVL V: ICD-10-PCS | Mod: PBBFAC,,, | Performed by: NURSE PRACTITIONER

## 2019-07-01 PROCEDURE — 99999 PR PBB SHADOW E&M-EST. PATIENT-LVL V: CPT | Mod: PBBFAC,,, | Performed by: NURSE PRACTITIONER

## 2019-07-01 PROCEDURE — 99213 OFFICE O/P EST LOW 20 MIN: CPT | Mod: S$PBB,,, | Performed by: NURSE PRACTITIONER

## 2019-07-01 NOTE — PROGRESS NOTES
Urogyn follow up  07/01/2019  .  [facility] 4   [address] [address]    June Hardik Judd  [id number]  1945 June Hardik Judd is a 74 y.o.  here for a urogyn follow up for recurrent uti.    Last HPI from 5/22/2019  1)  Mixed urinary incontinence, urge > stress:    --initially improved with pessary.   Pessary has been out since 11/2018-- was hospitalized for abdominal pain / ingestion of fish bone  2)  Vaginal atrophy (dryness):    --using estrogen cream  --using osphena   3)  Incomplete bladder emptying:  --can't tell if pessary helping; does have moderate 2nd void volumes by straining.  --currently being treated for uti  4)  Frequent UTIs (bladder infections):  --control diabetes  --work on improving bladder emptying  --has not been taking 250 mg maintenance dose  5)  Constipation intermittent with loose stool:  --better with 4-5 tsps/day  --not needing stool softener  --continue use miralax daily--see if taking 1/2 dose is sufficient      Changes from last visit:  1)  Mixed urinary incontinence, urge > stress:    --initially improved with pessary.    --+MAYKEL with pessary in place  --complains of vaginal spotting on and off since last visit     2)  Vaginal atrophy (dryness):    --using estrogen cream  --using osphena      3)  Incomplete bladder emptying:  --can't tell if pessary helping; does have moderate 2nd void volumes by straining.  --currently being treated for uti     4)  Frequent UTIs (bladder infections):  --control diabetes  --work on improving bladder emptying  --has not been taking 250 mg maintenance dose     5)  Constipation intermittent with loose stool:  --better with 4-5 tsps/day  --not needing stool softener  --continue use miralax daily--see if taking 1/2 dose is sufficient        11/26/2018  Cysto with Togami  FINDINGS:  Dome, anterior, posterior, lateral walls and bladder base free of urothelial abnormalities. Right and left ureteral orifices  in the normal postion and configuration, both effluxed clear urine.  Bladder neck and urethra were normal    11/15/2018  Retroperitoneal ultrasound  Right kidney: Normal in size measuring 10 cm. Increased cortical echogenicity.  No loss of corticomedullary distinction. Resistive index measures 0.70.  No mass. No renal stone. Mild hydronephrosis.  Left kidney: Normal in size measuring 10.1 cm. Increased cortical echogenicity.  No loss of corticomedullary distinction. Resistive index measures 0.74.  No mass. No renal stone. Mildly prominent collecting system, possibly representing mild hydronephrosis.  Urinary bladder is distended but otherwise unremarkable.  Both ureteral jets are seen.  Impression  Mild bilateral hydronephrosis, right side greater than left, similar to recent CT.  Distended urinary bladder with preservation of bilateral ureteral jets.    Past Medical History:   Diagnosis Date    Abdominal pain     Allergic rhinitis     Arthritis     Blood transfusion     during delivery and     Bowel obstruction     Cervical radiculopathy     followed by dr cloud    Colon cancer     transverse colon; resected; Stage IIA (pT3 pN0 MX)    Diarrhea     Family history of breast cancer     Family history of colon cancer     Fatty liver     GERD (gastroesophageal reflux disease)     History of shingles     Hyperlipidemia     Hypothyroidism     Irritable bowel syndrome     Microscopic colitis     treated     Raynaud phenomenon     Raynaud's disease     Type 2 diabetes mellitus        Past Surgical History:   Procedure Laterality Date    APPENDECTOMY      BACK SURGERY      CARPAL TUNNEL RELEASE      bilateral      SECTION      CHOLECYSTECTOMY  1965    COLECTOMY  2011    Transverse colon resection by Dr. Aguirre    COLONOSCOPY N/A 2019    Performed [facility] Jefferson [facility]    COLONOSCOPY N/A 2017    Performed by Manjit Alvarez MD at  [facility] (4TH [facility])    COLONOSCOPY N/A 5/2/2013    Performed by Angelo Reynolds MD at [facility] (4TH [facility])    EGD (ESOPHAGOGASTRODUODENOSCOPY) N/A 6/26/2019    Performed by Ramiro Jefferson MD at [facility] (4TH [facility]LR)    EGD (ESOPHAGOGASTRODUODENOSCOPY) N/A 11/16/2018    Performed by Angelo Reynolds MD at [facility] (2ND FLR)    EXPLORATORY-LAPAROTOMY N/A 4/1/2017    Performed by Herman Fletcher MD at [facility] OR 2ND FLR    EXPLORATORY-LAPAROTOMY N/A 9/2/2014    Performed by Gonzalez Silvestre MD at [facility] OR 2ND FLR    EYE SURGERY      Cataract Removal    HYSTERECTOMY      LYSIS-ADHESION N/A 4/1/2017    Performed by Herman Fletcher MD at [facility] OR 2[facility]    LYSIS-ADHESION N/A 9/2/2014    Performed by Gonzalez Silvestre MD at [facility] OR 2ND [facility]LR    PLACEMENT  4/1/2017    Performed by Herman Fletcher MD at [facility] OR 2[facility] FLR    posterolateral fusion with autograft bone and Eun mineralized bone matrix  2/1/13    at [facility] for lumbar spine stenosis    REPAIR  4/1/2017    Performed by Herman Fletcher MD at [facility]    RESECTION - SMALL BOWEL N/A 9/2/2014    Performed by Gonzalez Silvestre MD at [facility] OR 2[facility]    TOE SURGERY      TONSILLECTOMY      TRIGGER FINGER RELEASE      ULTRASOUND, UPPER GI TRACT, ENDOSCOPIC N/A 1/23/2019    Performed by Jose [facility] at [facility] (2[facility] F[facility])    ULTRASOUND, UPPER GI TRACT, ENDOSCOPIC N/A 12/12/2018    Performed by Jose Hess [facility] at [facility]       Current Outpatient Medications   Medication Sig    atorvastatin (LIPITOR) 40 MG tablet TAKE 1 TABLET(40 MG) BY MOUTH EVERY DAY    betamethasone dipropionate (DIPROLENE) 0.05 % ointment Apply every Am x 2 weeks then twice weekly    blood sugar diagnostic Strp Dispense Netcipia contour strips; test blood sugar once daily    blood-glucose meter (CONTOUR METER) kit Please dispense Madie Contour meter and supplies Use as instructed Test Blood sugar once daily    cephALEXin (KEFLEX) 250 MG capsule  Take 1 capsule (250 mg total) by mouth once daily.    cholestyramine-aspartame (QUESTRAN/PREVALITE LIGHT) 4 gram Powd Take 4 g by mouth once daily.    diphenhydrAMINE (BENADRYL) 25 mg capsule Take 1 each (25 mg total) by mouth nightly as needed for Itching, Allergies or Insomnia.    docusate sodium (COLACE) 100 MG capsule Take 1 capsule (100 mg total) by mouth 2 (two) times daily.    fluticasone (FLONASE) 50 mcg/actuation nasal spray 2 sprays by Each Nare route daily as needed.     FREESTYLE EFREN READER Misc TEST as directed    FREESTYLE EFREN SENSOR Kit     gabapentin (NEURONTIN) 600 MG tablet Take 1,200 mg by mouth 2 (two) times daily.     hydrocortisone 2.5 % cream     Lactobacillus rhamnosus GG (CULTURELLE) 10 billion cell capsule Take 1 capsule by mouth once daily.    lancets (ONE TOUCH ULTRASOFT LANCETS) Misc Test blood sugar once daily    levothyroxine (SYNTHROID) 100 MCG tablet TAKE 1 TABLET BY MOUTH EVERY MORNING    linagliptin (TRADJENTA) 5 mg Tab tablet Take 1 tablet (5 mg total) by mouth once daily.    magnesium 30 mg Tab Take 500 capsules by mouth. Patient's taking magnesium 500mg QD    meloxicam (MOBIC) 15 MG tablet TAKE 1 TABLET BY MOUTH DAILY WITH FOOD AS NEEDED FOR PAIN OR ARTHRITIS    metFORMIN (GLUCOPHAGE-XR) 750 MG 24 hr tablet Take 1 tablet (750 mg total) by mouth 2 (two) times daily with meals.    mirabegron 50 mg Tb24 Take 1 tablet (50 mg total) by mouth once daily.    nitrofurantoin, macrocrystal-monohydrate, (MACROBID) 100 MG capsule Take 1 capsule (100 mg total) by mouth 2 (two) times daily.    ondansetron (ZOFRAN) 4 MG tablet Take 1 tablet (4 mg total) by mouth every 8 (eight) hours as needed for Nausea.    ospemifene (OSPHENA) 60 mg Tab Take 60 mg by mouth once daily.    pantoprazole (PROTONIX) 40 MG tablet TAKE 1 TABLET BY MOUTH ONCE DAILY    polyethylene glycol (GLYCOLAX) 17 gram/dose powder Take 17 g by mouth once daily.    SHINGRIX, PF, 50 mcg/0.5 mL injection   "   triamcinolone acetonide 0.1% (KENALOG) 0.1 % paste apply to affected area with A Q-TIP three times a day    VIT A/VIT C/VIT E/ZINC/COPPER (ICAPS AREDS ORAL) Take by mouth 2 (two) times daily.    vitamin D 1000 units Tab Take 185 mg by mouth once daily. D3    azelastine (ASTELIN) 137 mcg nasal spray 1 spray (137 mcg total) by Nasal route 2 (two) times daily.    conjugated estrogens (PREMARIN) vaginal cream Place 0.5 g vaginally every evening.     Current Facility-Administered Medications   Medication    amoxicillin capsule 500 mg       Well woman:  Pap test: post ANGEL.  History of abnormal paps: Yes--had ANGEL.  History of STIs:  No  Mammogram: Date of last: 2018.  Result: Normal per report (Anjelicao)  Colonoscopy: Date of last: 2017.  Result:  Benign polyps.  Repeat due:  2019.    DEXA:  Date of last: 2015.  Result:  Normal.      ROS:  As per HPI.      Exam  /60 (BP Location: Right arm, Patient Position: Sitting, BP Method: Medium (Manual))   Ht 5' 2" (1.575 m)   Wt 66.6 kg (146 lb 13.2 oz)   LMP  (LMP Unknown)   BMI 26.85 kg/m²   General: alert and oriented, no acute distress  Respiratory: normal respiratory effort  Abd: soft, non-tender, non-distended    Pelvic  Ext. Genitalia:external genitalia no lesions. Atrophic. Normal bartholin's and skeens glands  Vagina: + atrophy. Normal vaginal mucosa without lesions. No discharge noted. Small abrasion noted on R vaginal wall-- no active bleeding.   #2 ring with support pessary in place  Cervix: absent  Uterus:  absent   Urethra: no masses or tenderness  Urethral meatus: no lesions, caruncle or prolapse.    Pessary removed without difficulty. Washed with soap and water and given to patient for pessary holiday.    Impression  1. Mixed stress and urge urinary incontinence     2. Vaginal atrophy     3. Vulvar irritation     4. Frequent UTI     5. Abrasion of vagina, initial encounter     6. Pessary maintenance       We reviewed the above issues and discussed " options for short-term versus long-term management of her problems.   Plan:     1)  Mixed urinary incontinence, urge > stress:    --control diabetes  --Empty bladder every 3 hours.  Empty well: wait a minute, lean forward on toilet.    --Avoid dietary irritants (see sheet).  Keep diary x 3-5 days to determine your irritants.  --consider PT in future   --URGE: consider medication (Mirabegron due to SBO).  Takes 2-4 weeks to see if will have effect.  For dry mouth: get sour, sugar free lozenge or gum.    --STRESS:  Pessary vs. Sling.   --#2 ring with support pessary holiday     2)  Vaginal atrophy (dryness):  Use dime-sized amount of estrogen cream with finger around vaginal opening/inner lips  Nightly until next visit.    --continue osphena  --may also reduce bladder infection rate     3)  Vulvar irritation:  --use betamethasone Am  twice weekly  --use estrogen cream  Pm  twice weekly  --use estrogen cream in the vaginal nightly twice weekly  --use moisture barrier ointment    4)  Incomplete bladder emptying:  --resolved with pessary     5)  Frequent UTIs (bladder infections):  --control diabetes  --work on improving bladder emptying  --continue Keflex 250 mg daily  --If you feel like you have a UTI, please call our office so that we can place an order for you to drop off a urine specimen at the closest [facility] lab (we will arrange).                --We will call in antibiotics for you to start right after you drop off specimen.                --In this way, we can determine:                           1)  Do you have a UTI?                            2) If you have a UTI, is it sensitive to the antibiotics we prescribed?   --follow UTI prevention tips (see attached)  --control bowel movements/fecal cross-contamination  --treat vaginal atrophy (dryness)  --empty bladder before and after intercourse  --consider need for further evaluation (pelvic imaging and cystoscopy) if issue persists; 618 CT A/P  normal     6)   Constipation intermittent with loose stool:  Controlling constipation may help bladder urgency/leakage and fiber may better control cholesterol and blood glucose.  Increas daily fiber.  Increase fiber by 1 tablet every 3-5 days until stool is easy to pass. Take fiber tablets with large glass of water.  When find place where stool is easy to pass and more well-formed, less loose/accidents,  stop and continue at that dose.   Do not exceed 6 tablets/day.    --May also use over the counter stool softener 1-2 x/day.  --continue use miralax daily--see if taking 1/2 dose is sufficient     7)  RTC 1 week for pessary insertion.      30 minutes were spent in face to face time with this patient  90 % of this time was spent in counseling and/or coordination of care    KENNEDY Petit-BC [facility]  Division of Female Pelvic Medicine and Reconstructive Surgery  Department of Obstetrics & Gynecology

## 2019-07-01 NOTE — TELEPHONE ENCOUNTER
----- Message from Mandy Charly sent at 7/1/2019  1:25 PM CDT -----  Contact: Nuria argueta/Dr. Oconnor's office      -------FST Request------      Name of Who is Calling: Nuria argueta/Dr. Oconnor's office      What is the request in detail: Nuria states the pt is here to see Dr. Oconnor and states the pt's pessary has some blood on it. Dr. Oconnor is asking that the pt be seen today. Please contact to further discuss and advise.        Can the clinic reply by [facility]: N      What Number to Call Back [facility] not [facility]: [phone]

## 2019-07-01 NOTE — PATIENT INSTRUCTIONS
1)  Mixed urinary incontinence, urge > stress:    --control diabetes  --Empty bladder every 3 hours.  Empty well: wait a minute, lean forward on toilet.    --Avoid dietary irritants (see sheet).  Keep diary x 3-5 days to determine your irritants.  --consider PT in future   --URGE: consider medication (Mirabegron due to SBO).  Takes 2-4 weeks to see if will have effect.  For dry mouth: get sour, sugar free lozenge or gum.    --STRESS:  Pessary vs. Sling.   --#2 ring with support pessary holiday     2)  Vaginal atrophy (dryness):  Use dime-sized amount of estrogen cream with finger around vaginal opening/inner lips  Nightly until next visit.    --continue osphena  --may also reduce bladder infection rate     3)  Vulvar irritation:  --use betamethasone Am  twice weekly  --use estrogen cream  Pm  twice weekly  --use estrogen cream in the vaginal nightly twice weekly  --use moisture barrier ointment    4)  Incomplete bladder emptying:  --resolved with pessary     5)  Frequent UTIs (bladder infections):  --control diabetes  --work on improving bladder emptying  --continue Keflex 250 mg daily  --If you feel like you have a UTI, please call our office so that we can place an order for you to drop off a urine specimen at the closest [facility] lab (we will arrange).                --We will call in antibiotics for you to start right after you drop off specimen.                --In this way, we can determine:                           1)  Do you have a UTI?                            2) If you have a UTI, is it sensitive to the antibiotics we prescribed?   --follow UTI prevention tips (see attached)  --control bowel movements/fecal cross-contamination  --treat vaginal atrophy (dryness)  --empty bladder before and after intercourse  --consider need for further evaluation (pelvic imaging and cystoscopy) if issue persists; 618 CT A/P  normal     6)  Constipation intermittent with loose stool:  Controlling constipation may help  bladder urgency/leakage and fiber may better control cholesterol and blood glucose.  Increas daily fiber.  Increase fiber by 1 tablet every 3-5 days until stool is easy to pass. Take fiber tablets with large glass of water.  When find place where stool is easy to pass and more well-formed, less loose/accidents,  stop and continue at that dose.   Do not exceed 6 tablets/day.    --May also use over the counter stool softener 1-2 x/day.  --continue use miralax daily--see if taking 1/2 dose is sufficient     7)  RTC 1 week for pessary insertion.

## 2019-07-01 NOTE — TELEPHONE ENCOUNTER
Spoke with pt and informed her per NP Rossy will see her today. Pt voiced understanding and call ended.

## 2019-07-03 ENCOUNTER — TELEPHONE (OUTPATIENT)
Dept: ENDOSCOPY | Facility: HOSPITAL | Age: 74
End: 2019-07-03

## 2019-07-08 ENCOUNTER — OFFICE VISIT (OUTPATIENT)
Dept: UROGYNECOLOGY | Facility: CLINIC | Age: 74
End: 2019-07-08
Payer: MEDICARE

## 2019-07-08 VITALS
WEIGHT: 141.75 LBS | DIASTOLIC BLOOD PRESSURE: 60 MMHG | SYSTOLIC BLOOD PRESSURE: 110 MMHG | BODY MASS INDEX: 26.09 KG/M2 | HEIGHT: 62 IN

## 2019-07-08 DIAGNOSIS — N81.11 MIDLINE CYSTOCELE: ICD-10-CM

## 2019-07-08 DIAGNOSIS — R39.15 URINARY URGENCY: ICD-10-CM

## 2019-07-08 DIAGNOSIS — N81.6 RECTOCELE: ICD-10-CM

## 2019-07-08 DIAGNOSIS — N39.0 FREQUENT UTI: Primary | ICD-10-CM

## 2019-07-08 DIAGNOSIS — N95.2 VAGINAL ATROPHY: ICD-10-CM

## 2019-07-08 DIAGNOSIS — N39.46 MIXED STRESS AND URGE URINARY INCONTINENCE: ICD-10-CM

## 2019-07-08 PROCEDURE — 87086 URINE CULTURE/COLONY COUNT: CPT

## 2019-07-08 PROCEDURE — 99215 OFFICE O/P EST HI 40 MIN: CPT | Mod: PBBFAC | Performed by: NURSE PRACTITIONER

## 2019-07-08 PROCEDURE — 99999 PR PBB SHADOW E&M-EST. PATIENT-LVL V: CPT | Mod: PBBFAC,,, | Performed by: NURSE PRACTITIONER

## 2019-07-08 PROCEDURE — 99999 PR PBB SHADOW E&M-EST. PATIENT-LVL V: ICD-10-PCS | Mod: PBBFAC,,, | Performed by: NURSE PRACTITIONER

## 2019-07-08 PROCEDURE — 87077 CULTURE AEROBIC IDENTIFY: CPT

## 2019-07-08 PROCEDURE — 99213 OFFICE O/P EST LOW 20 MIN: CPT | Mod: S$PBB,,, | Performed by: NURSE PRACTITIONER

## 2019-07-08 PROCEDURE — 87088 URINE BACTERIA CULTURE: CPT

## 2019-07-08 PROCEDURE — 99213 PR OFFICE/OUTPT VISIT, EST, LEVL III, 20-29 MIN: ICD-10-PCS | Mod: S$PBB,,, | Performed by: NURSE PRACTITIONER

## 2019-07-08 PROCEDURE — 87186 SC STD MICRODIL/AGAR DIL: CPT

## 2019-07-08 NOTE — PATIENT INSTRUCTIONS
1)  Mixed urinary incontinence, urge > stress:    --control diabetes  --Empty bladder every 3 hours.  Empty well: wait a minute, lean forward on toilet.    --Avoid dietary irritants (see sheet).  Keep diary x 3-5 days to determine your irritants.  --consider PT in future   --URGE: consider medication (Mirabegron due to SBO).  Takes 2-4 weeks to see if will have effect.  For dry mouth: get sour, sugar free lozenge or gum.    --STRESS:  Pessary vs. Sling.   --#2 ring with support pessary holiday     2)  Vaginal atrophy (dryness):  Use dime-sized amount of estrogen cream with finger around vaginal opening/inner lips  Nightly until next visit.    --continue osphena  --may also reduce bladder infection rate     3)  Vulvar irritation:  --use betamethasone Am  twice weekly  --use estrogen cream  Pm  twice weekly  --use estrogen cream in the vaginal nightly twice weekly  --use moisture barrier ointment    4)  Incomplete bladder emptying:  --resolved with pessary     5)  Frequent UTIs (bladder infections):  --control diabetes  --work on improving bladder emptying  --continue Keflex 250 mg daily  --If you feel like you have a UTI, please call our office so that we can place an order for you to drop off a urine specimen at the closest [facility] lab (we will arrange).                --We will call in antibiotics for you to start right after you drop off specimen.                --In this way, we can determine:                           1)  Do you have a UTI?                            2) If you have a UTI, is it sensitive to the antibiotics we prescribed?   --follow UTI prevention tips (see attached)  --control bowel movements/fecal cross-contamination  --treat vaginal atrophy (dryness)  --empty bladder before and after intercourse  --consider need for further evaluation (pelvic imaging and cystoscopy) if issue persists; 618 CT A/P  normal     6)  Constipation intermittent with loose stool:  Controlling constipation may help  bladder urgency/leakage and fiber may better control cholesterol and blood glucose.  Increas daily fiber.  Increase fiber by 1 tablet every 3-5 days until stool is easy to pass. Take fiber tablets with large glass of water.  When find place where stool is easy to pass and more well-formed, less loose/accidents,  stop and continue at that dose.   Do not exceed 6 tablets/day.    --May also use over the counter stool softener 1-2 x/day.  --continue use miralax daily--see if taking 1/2 dose is sufficient     7)  RTC 3 months

## 2019-07-08 NOTE — PROGRESS NOTES
Urogyn follow up  07/08/2019  .  [facility]   [address] [id number]    June Hardik Judd  [id number]  1945 June Hardik Judd is a 74 y.o.  here for a urogyn follow up for recurrent uti.    Last HPI from 5/22/2019  1)  Mixed urinary incontinence, urge > stress:    --initially improved with pessary.   Pessary has been out since 11/2018-- was hospitalized for abdominal pain / ingestion of fish bone  2)  Vaginal atrophy (dryness):    --using estrogen cream  --using osphena   3)  Incomplete bladder emptying:  --can't tell if pessary helping; does have moderate 2nd void volumes by straining.  --currently being treated for uti  4)  Frequent UTIs (bladder infections):  --control diabetes  --work on improving bladder emptying  --has not been taking 250 mg maintenance dose  5)  Constipation intermittent with loose stool:  --better with 4-5 tsps/day  --not needing stool softener  --continue use miralax daily--see if taking 1/2 dose is sufficient      Changes from last visit:  1)  Mixed urinary incontinence, urge > stress:    --initially improved with pessary.    --+MAYKEL with pessary in place  --complains of vaginal spotting on and off since last visit     2)  Vaginal atrophy (dryness):    --using estrogen cream  --using osphena      3)  Incomplete bladder emptying:  --can't tell if pessary helping; does have moderate 2nd void volumes by straining.  --currently being treated for uti     4)  Frequent UTIs (bladder infections):  --control diabetes  --work on improving bladder emptying  --has not been taking 250 mg maintenance dose     5)  Constipation intermittent with loose stool:  --better with 4-5 tsps/day  --not needing stool softener  --continue use miralax daily--see if taking 1/2 dose is sufficient        11/26/2018  Cysto with Togami  FINDINGS:  Dome, anterior, posterior, lateral walls and bladder base free of urothelial abnormalities. Right and left ureteral orifices  in the normal postion and configuration, both effluxed clear urine.  Bladder neck and urethra were normal    11/15/2018  Retroperitoneal ultrasound  Right kidney: Normal in size measuring 10 cm. Increased cortical echogenicity.  No loss of corticomedullary distinction. Resistive index measures 0.70.  No mass. No renal stone. Mild hydronephrosis.  Left kidney: Normal in size measuring 10.1 cm. Increased cortical echogenicity.  No loss of corticomedullary distinction. Resistive index measures 0.74.  No mass. No renal stone. Mildly prominent collecting system, possibly representing mild hydronephrosis.  Urinary bladder is distended but otherwise unremarkable.  Both ureteral jets are seen.  Impression  Mild bilateral hydronephrosis, right side greater than left, similar to recent CT.  Distended urinary bladder with preservation of bilateral ureteral jets.    Past Medical History:   Diagnosis Date    Abdominal pain     Allergic rhinitis     Arthritis     Blood transfusion     during delivery and     Bowel obstruction     Cervical radiculopathy     followed by dr cloud    Colon cancer     transverse colon; resected; Stage IIA (pT3 pN0 MX)    Diarrhea     Family history of breast cancer     Family history of colon cancer     Fatty liver     GERD (gastroesophageal reflux disease)     History of shingles     Hyperlipidemia     Hypothyroidism     Irritable bowel syndrome     Microscopic colitis     treated     Raynaud phenomenon     Raynaud's disease     Type 2 diabetes mellitus        Past Surgical History:   Procedure Laterality Date    APPENDECTOMY      BACK SURGERY      CARPAL TUNNEL RELEASE      bilateral      SECTION      CHOLECYSTECTOMY  1965    COLECTOMY  2011    Transverse colon resection by Dr. Aguirre    COLONOSCOPY N/A 2019    [facility])    COLONOSCOPY N/A 2017    Performed by Manjit Alvarez MD at  [facility] (4TH [facility])    COLONOSCOPY N/A 5/2/2013    Performed by Angelo Reynolds MD at [facility] (4TH [facility])    EGD (ESOPHAGOGASTRODUODENOSCOPY) N/A 6/26/2019    Performed by [facility]amiro Jefferson [facility] at [facility] (4[facility])    EGD (ESOPHAGOGASTRODUODENOSCOPY) N/A 11/16/2018    Performed by Angelo Reynolds [facility] at [facility] (2ND FLR)    EXPLORATORY-LAPAROTOMY N/A 4/1/2017    Performed by Herman Fletcher [facility] at [facility]    EXPLORATORY-LAPAROTOMY N/A 9/2/2014    Performed by Gonzalez Silvestre [facility] at [facility]LR    EYE SURGERY      Cataract Removal    HYSTERECTOMY      LYSIS-ADHESION N/A 4/1/2017    Performed [facility] Herman [facility] at [facility]    LYSIS-ADHESION N/A 9/2/2014    Performed by [facility] at [facility]    PLACEMENT  4/1/2017    Performed [facility] Herman [facility] at [facility]    posterolateral fusion with autograft bone and Eun mineralized bone matrix  2/1/13    at [facility] for [facility] stenosis    REPAIR  4/1/2017    Performed by Herman [facility] at [facility]    RESECTION - SMALL BOWEL N/A 9/2/2014    Performed by [facility] at [facility]    TOE SURGERY      TONSILLECTOMY      TRIGGER FINGER RELEASE      ULTRASOUND, UPPER GI TRACT, ENDOSCOPIC N/A 1/23/2019    [facility]formed [facility] (2[facility])    ULTRASOUND, UPPER GI TRACT, ENDOSCOPIC N/A 12/12/2018    Performed by Jose Hess [facility] at [facility]       Current Outpatient Medications   Medication Sig    atorvastatin (LIPITOR) 40 MG tablet TAKE 1 TABLET(40 MG) BY MOUTH EVERY DAY    betamethasone dipropionate (DIPROLENE) 0.05 % ointment Apply every Am x 2 weeks then twice weekly    blood sugar diagnostic Strp Dispense [facility] contour strips; test blood sugar once daily    blood-glucose meter (CONTOUR METER) kit Please dispense Madie Contour meter and supplies Use as instructed Test Blood sugar once daily    cephALEXin (KEFLEX) 250 MG capsule  Take 1 capsule (250 mg total) by mouth once daily.    cholestyramine-aspartame (QUESTRAN/PREVALITE LIGHT) 4 gram Powd Take 4 g by mouth once daily.    diphenhydrAMINE (BENADRYL) 25 mg capsule Take 1 each (25 mg total) by mouth nightly as needed for Itching, Allergies or Insomnia.    docusate sodium (COLACE) 100 MG capsule Take 1 capsule (100 mg total) by mouth 2 (two) times daily.    fluticasone (FLONASE) 50 mcg/actuation nasal spray 2 sprays by Each Nare route daily as needed.     FREESTYLE EFREN READER Misc TEST as directed    FREESTYLE EFREN SENSOR Kit     gabapentin (NEURONTIN) 600 MG tablet Take 1,200 mg by mouth 2 (two) times daily.     hydrocortisone 2.5 % cream     Lactobacillus rhamnosus GG (CULTURELLE) 10 billion cell capsule Take 1 capsule by mouth once daily.    lancets (ONE TOUCH ULTRASOFT LANCETS) Misc Test blood sugar once daily    levothyroxine (SYNTHROID) 100 MCG tablet TAKE 1 TABLET BY MOUTH EVERY MORNING    linagliptin (TRADJENTA) 5 mg Tab tablet Take 1 tablet (5 mg total) by mouth once daily.    magnesium 30 mg Tab Take 500 capsules by mouth. Patient's taking magnesium 500mg QD    meloxicam (MOBIC) 15 MG tablet TAKE 1 TABLET BY MOUTH DAILY WITH FOOD AS NEEDED FOR PAIN OR ARTHRITIS    metFORMIN (GLUCOPHAGE-XR) 750 MG 24 hr tablet Take 1 tablet (750 mg total) by mouth 2 (two) times daily with meals.    mirabegron 50 mg Tb24 Take 1 tablet (50 mg total) by mouth once daily.    nitrofurantoin, macrocrystal-monohydrate, (MACROBID) 100 MG capsule Take 1 capsule (100 mg total) by mouth 2 (two) times daily.    ondansetron (ZOFRAN) 4 MG tablet Take 1 tablet (4 mg total) by mouth every 8 (eight) hours as needed for Nausea.    ospemifene (OSPHENA) 60 mg Tab Take 60 mg by mouth once daily.    pantoprazole (PROTONIX) 40 MG tablet TAKE 1 TABLET BY MOUTH ONCE DAILY    polyethylene glycol (GLYCOLAX) 17 gram/dose powder Take 17 g by mouth once daily.    triamcinolone acetonide 0.1% (KENALOG)  "0.1 % paste apply to affected area with A Q-TIP three times a day    VIT A/VIT C/VIT E/ZINC/COPPER (ICAPS AREDS ORAL) Take by mouth 2 (two) times daily.    vitamin D 1000 units Tab Take 185 mg by mouth once daily. D3    acetaZOLAMIDE (DIAMOX) 250 MG tablet Take 1 tablet (250 mg total) by mouth 3 (three) times daily.    azelastine (ASTELIN) 137 mcg nasal spray 1 spray (137 mcg total) by Nasal route 2 (two) times daily.    conjugated estrogens (PREMARIN) vaginal cream Place 0.5 g vaginally every evening.    mirabegron (MYRBETRIQ) 50 mg Tb24 Take 1 tablet (50 mg total) by mouth once daily.    ondansetron (ZOFRAN-ODT) 8 MG TbDL Take 1 tablet (8 mg total) by mouth once. for 1 dose     Current Facility-Administered Medications   Medication    amoxicillin capsule 500 mg       Well woman:  Pap test: post ANGEL.  History of abnormal paps: Yes--had ANGEL.  History of STIs:  No  Mammogram: Date of last: 2018.  Result: Normal per report ([facility])  Colonoscopy: Date of last: 2017.  Result:  Benign polyps.  Repeat due:  2019.    DEXA:  Date of last: 2015.  Result:  Normal.      ROS:  As per HPI.      Exam  /60 (BP Location: Right arm, Patient Position: Sitting, BP Method: Medium (Manual))   Ht 5' 2" (1.575 m)   Wt 64.3 kg (141 lb 12.1 oz)   LMP  (LMP Unknown)   BMI 25.93 kg/m²   General: alert and oriented, no acute distress  Respiratory: normal respiratory effort  Abd: soft, non-tender, non-distended    Pelvic  Ext. Genitalia:external genitalia no lesions. Atrophic. Normal bartholin's and skeens glands  Vagina: + atrophy. Normal vaginal mucosa without lesions. No discharge noted. Abrasion is resolved.   Cervix: absent  Uterus:  absent   Urethra: no masses or tenderness  Urethral meatus: no lesions, caruncle or prolapse.    #2 ring with support pessary inserted without difficulty.    Impression  1. Frequent UTI  Urine culture   2. Vaginal atrophy  conjugated estrogens (PREMARIN) vaginal cream   3. Urinary urgency  " Urine culture   4. Mixed stress and urge urinary incontinence     5. Midline cystocele     6. Rectocele       We reviewed the above issues and discussed options for short-term versus long-term management of her problems.   Plan:     1)  Mixed urinary incontinence, urge > stress:    --control diabetes  --Empty bladder every 3 hours.  Empty well: wait a minute, lean forward on toilet.    --Avoid dietary irritants (see sheet).  Keep diary x 3-5 days to determine your irritants.  --consider PT in future   --URGE: consider medication (Mirabegron due to SBO).  Takes 2-4 weeks to see if will have effect.  For dry mouth: get sour, sugar free lozenge or gum.    --STRESS:  Pessary vs. Sling.   --#2 ring with support pessary holiday     2)  Vaginal atrophy (dryness):  Use dime-sized amount of estrogen cream with finger around vaginal opening/inner lips  Nightly until next visit.    --continue osphena  --may also reduce bladder infection rate     3)  Vulvar irritation:  --use betamethasone Am  twice weekly  --use estrogen cream  Pm  twice weekly  --use estrogen cream in the vaginal nightly twice weekly  --use moisture barrier ointment    4)  Incomplete bladder emptying:  --resolved with pessary     5)  Frequent UTIs (bladder infections):  --control diabetes  --work on improving bladder emptying  --continue Keflex 250 mg daily  --If you feel like you have a UTI, please call our office so that we can place an order for you to drop off a urine specimen at the closest [facility] lab (we will arrange).                --We will call in antibiotics for you to start right after you drop off specimen.                --In this way, we can determine:                           1)  Do you have a UTI?                            2) If you have a UTI, is it sensitive to the antibiotics we prescribed?   --follow UTI prevention tips (see attached)  --control bowel movements/fecal cross-contamination  --treat vaginal atrophy (dryness)  --empty  bladder before and after intercourse  --consider need for further evaluation (pelvic imaging and cystoscopy) if issue persists; 618 CT A/P  normal     6)  Constipation intermittent with loose stool:  Controlling constipation may help bladder urgency/leakage and fiber may better control cholesterol and blood glucose.  Increas daily fiber.  Increase fiber by 1 tablet every 3-5 days until stool is easy to pass. Take fiber tablets with large glass of water.  When find place where stool is easy to pass and more well-formed, less loose/accidents,  stop and continue at that dose.   Do not exceed 6 tablets/day.    --May also use over the counter stool softener 1-2 x/day.  --continue use miralax daily--see if taking 1/2 dose is sufficient     7)  RTC 3 months      30 minutes were spent in face to face time with this patient  90 % of this time was spent in counseling and/or coordination of care    KENNEDY Petit-BC  [facility]  Division of Female Pelvic Medicine and Reconstructive Surgery  Department of Obstetrics & Gynecology

## 2019-07-11 ENCOUNTER — TELEPHONE (OUTPATIENT)
Dept: UROGYNECOLOGY | Facility: CLINIC | Age: 74
End: 2019-07-11

## 2019-07-11 DIAGNOSIS — N39.0 FREQUENT UTI: Primary | ICD-10-CM

## 2019-07-11 LAB — BACTERIA UR CULT: ABNORMAL

## 2019-07-11 RX ORDER — NITROFURANTOIN 25; 75 MG/1; MG/1
100 CAPSULE ORAL 2 TIMES DAILY
Qty: 14 CAPSULE | Refills: 0 | Status: SHIPPED | OUTPATIENT
Start: 2019-07-11 | End: 2019-12-13 | Stop reason: SDUPTHER

## 2019-07-11 NOTE — TELEPHONE ENCOUNTER
Informed pt OSORIO Blair sent in an rx for Macrobid to her pharmacy due to urine culture being positive for infection. Pt voiced understanding and call ended.

## 2019-07-24 ENCOUNTER — OFFICE VISIT (OUTPATIENT)
Dept: HEMATOLOGY/ONCOLOGY | Facility: CLINIC | Age: 74
End: 2019-07-24
Payer: MEDICARE

## 2019-07-24 ENCOUNTER — LAB VISIT (OUTPATIENT)
Dept: LAB | Facility: HOSPITAL | Age: 74
End: 2019-07-24
Payer: MEDICARE

## 2019-07-24 VITALS
RESPIRATION RATE: 16 BRPM | HEART RATE: 60 BPM | TEMPERATURE: 98 F | DIASTOLIC BLOOD PRESSURE: 57 MMHG | WEIGHT: 146.63 LBS | HEIGHT: 64 IN | BODY MASS INDEX: 25.03 KG/M2 | OXYGEN SATURATION: 100 % | SYSTOLIC BLOOD PRESSURE: 115 MMHG

## 2019-07-24 DIAGNOSIS — Z85.038 HISTORY OF COLON CANCER: Primary | ICD-10-CM

## 2019-07-24 DIAGNOSIS — R10.9 ABDOMINAL PAIN, UNSPECIFIED ABDOMINAL LOCATION: ICD-10-CM

## 2019-07-24 DIAGNOSIS — E03.9 HYPOTHYROIDISM, UNSPECIFIED TYPE: ICD-10-CM

## 2019-07-24 DIAGNOSIS — R19.7 DIARRHEA, UNSPECIFIED TYPE: ICD-10-CM

## 2019-07-24 DIAGNOSIS — E03.4 HYPOTHYROIDISM DUE TO ACQUIRED ATROPHY OF THYROID: ICD-10-CM

## 2019-07-24 DIAGNOSIS — E11.9 TYPE 2 DIABETES MELLITUS WITHOUT COMPLICATION, WITHOUT LONG-TERM CURRENT USE OF INSULIN: ICD-10-CM

## 2019-07-24 LAB
ALBUMIN SERPL BCP-MCNC: 3.3 G/DL (ref 3.5–5.2)
ALP SERPL-CCNC: 50 U/L (ref 55–135)
ALT SERPL W/O P-5'-P-CCNC: 17 U/L (ref 10–44)
ANION GAP SERPL CALC-SCNC: 11 MMOL/L (ref 8–16)
AST SERPL-CCNC: 24 U/L (ref 10–40)
BASOPHILS # BLD AUTO: 0.03 K/UL (ref 0–0.2)
BASOPHILS NFR BLD: 0.6 % (ref 0–1.9)
BILIRUB SERPL-MCNC: 0.5 MG/DL (ref 0.1–1)
BUN SERPL-MCNC: 15 MG/DL (ref 8–23)
CALCIUM SERPL-MCNC: 9.4 MG/DL (ref 8.7–10.5)
CEA SERPL-MCNC: 4.4 NG/ML (ref 0–5)
CHLORIDE SERPL-SCNC: 104 MMOL/L (ref 95–110)
CHOLEST SERPL-MCNC: 116 MG/DL (ref 120–199)
CHOLEST/HDLC SERPL: 2.2 {RATIO} (ref 2–5)
CO2 SERPL-SCNC: 22 MMOL/L (ref 23–29)
CREAT SERPL-MCNC: 1.1 MG/DL (ref 0.5–1.4)
DIFFERENTIAL METHOD: ABNORMAL
EOSINOPHIL # BLD AUTO: 0.2 K/UL (ref 0–0.5)
EOSINOPHIL NFR BLD: 3.5 % (ref 0–8)
ERYTHROCYTE [DISTWIDTH] IN BLOOD BY AUTOMATED COUNT: 17.4 % (ref 11.5–14.5)
EST. GFR  (AFRICAN AMERICAN): 56.4 ML/MIN/1.73 M^2
EST. GFR  (NON AFRICAN AMERICAN): 48.9 ML/MIN/1.73 M^2
ESTIMATED AVG GLUCOSE: 151 MG/DL (ref 68–131)
GLUCOSE SERPL-MCNC: 155 MG/DL (ref 70–110)
HBA1C MFR BLD HPLC: 6.9 % (ref 4–5.6)
HCT VFR BLD AUTO: 31 % (ref 37–48.5)
HDLC SERPL-MCNC: 53 MG/DL (ref 40–75)
HDLC SERPL: 45.7 % (ref 20–50)
HGB BLD-MCNC: 9.1 G/DL (ref 12–16)
IMM GRANULOCYTES # BLD AUTO: 0.02 K/UL (ref 0–0.04)
IMM GRANULOCYTES NFR BLD AUTO: 0.4 % (ref 0–0.5)
LDLC SERPL CALC-MCNC: 38.6 MG/DL (ref 63–159)
LYMPHOCYTES # BLD AUTO: 1.3 K/UL (ref 1–4.8)
LYMPHOCYTES NFR BLD: 24.2 % (ref 18–48)
MAGNESIUM SERPL-MCNC: 1.6 MG/DL (ref 1.6–2.6)
MCH RBC QN AUTO: 23.7 PG (ref 27–31)
MCHC RBC AUTO-ENTMCNC: 29.4 G/DL (ref 32–36)
MCV RBC AUTO: 81 FL (ref 82–98)
MONOCYTES # BLD AUTO: 0.6 K/UL (ref 0.3–1)
MONOCYTES NFR BLD: 10.1 % (ref 4–15)
NEUTROPHILS # BLD AUTO: 3.3 K/UL (ref 1.8–7.7)
NEUTROPHILS NFR BLD: 61.2 % (ref 38–73)
NONHDLC SERPL-MCNC: 63 MG/DL
NRBC BLD-RTO: 0 /100 WBC
PLATELET # BLD AUTO: 130 K/UL (ref 150–350)
PMV BLD AUTO: 10.8 FL (ref 9.2–12.9)
POTASSIUM SERPL-SCNC: 4.5 MMOL/L (ref 3.5–5.1)
PROT SERPL-MCNC: 6.6 G/DL (ref 6–8.4)
RBC # BLD AUTO: 3.84 M/UL (ref 4–5.4)
SODIUM SERPL-SCNC: 137 MMOL/L (ref 136–145)
T4 FREE SERPL-MCNC: 1.14 NG/DL (ref 0.71–1.51)
TRIGL SERPL-MCNC: 122 MG/DL (ref 30–150)
TSH SERPL DL<=0.005 MIU/L-ACNC: 2.55 UIU/ML (ref 0.4–4)
WBC # BLD AUTO: 5.42 K/UL (ref 3.9–12.7)

## 2019-07-24 PROCEDURE — 80061 LIPID PANEL: CPT

## 2019-07-24 PROCEDURE — 99213 PR OFFICE/OUTPT VISIT, EST, LEVL III, 20-29 MIN: ICD-10-PCS | Mod: S$PBB,,, | Performed by: INTERNAL MEDICINE

## 2019-07-24 PROCEDURE — 99215 OFFICE O/P EST HI 40 MIN: CPT | Mod: PBBFAC | Performed by: INTERNAL MEDICINE

## 2019-07-24 PROCEDURE — 36415 COLL VENOUS BLD VENIPUNCTURE: CPT

## 2019-07-24 PROCEDURE — 83036 HEMOGLOBIN GLYCOSYLATED A1C: CPT

## 2019-07-24 PROCEDURE — 99213 OFFICE O/P EST LOW 20 MIN: CPT | Mod: S$PBB,,, | Performed by: INTERNAL MEDICINE

## 2019-07-24 PROCEDURE — 99999 PR PBB SHADOW E&M-EST. PATIENT-LVL V: CPT | Mod: PBBFAC,,, | Performed by: INTERNAL MEDICINE

## 2019-07-24 PROCEDURE — 99999 PR PBB SHADOW E&M-EST. PATIENT-LVL V: ICD-10-PCS | Mod: PBBFAC,,, | Performed by: INTERNAL MEDICINE

## 2019-07-24 PROCEDURE — 82378 CARCINOEMBRYONIC ANTIGEN: CPT

## 2019-07-24 PROCEDURE — 85025 COMPLETE CBC W/AUTO DIFF WBC: CPT

## 2019-07-24 PROCEDURE — 80053 COMPREHEN METABOLIC PANEL: CPT

## 2019-07-24 PROCEDURE — 84439 ASSAY OF FREE THYROXINE: CPT

## 2019-07-24 PROCEDURE — 84443 ASSAY THYROID STIM HORMONE: CPT

## 2019-07-24 PROCEDURE — 83735 ASSAY OF MAGNESIUM: CPT

## 2019-07-24 RX ORDER — ONDANSETRON 8 MG/1
TABLET, ORALLY DISINTEGRATING ORAL
COMMUNITY
Start: 2019-07-08 | End: 2021-02-26

## 2019-07-24 RX ORDER — TRAMADOL HYDROCHLORIDE 50 MG/1
TABLET ORAL
COMMUNITY
Start: 2019-07-22 | End: 2020-08-13

## 2019-07-24 NOTE — PROGRESS NOTES
"Subjective:       Patient ID: Anayeli Judd is a 74 y.o. female.    Chief Complaint: History of colon cancer  ONCOLOGIC HISTORY:    HPIMs. Judd is a 72-year-old female with a diagnosis of stage IIA transverse colon adenocarcinoma and underwent transverse colectomy on 6/27/11. She completed seven cycles of FOLFOX and then had to be taken off chemotherapy secondary to prolonged hospitalization and diarrhea.    She had a colonoscopy in 5/2013 was normal and a repeat was recommended in 5 years.                HPI She comes in to review labs. Colonoscopy in June 2019 was normal. CT scans on 6/20/19 reveals "In this patient with postoperative changes from prior brow resection, there are nodular foci of enhancement adjacent to anastomotic suture in the midline of the abdomen.  Findings are in nonspecific and could reflect ingested material, enhancing masses/polyps, or source of hemorrhage.  Recommend clinical correlation. There is persistent fluid material noted throughout several loops of distended small bowel, as well as fluid and stool material within the colon.  Findings may reflect diarrheal disease.  No evidence of obstruction or inflammation.  She has been continuing to have issues with bowel obstruction.    Review of Systems   Constitutional: Negative for appetite change and unexpected weight change.   Eyes: Negative for visual disturbance.   Respiratory: Negative for cough and shortness of breath.    Cardiovascular: Negative for chest pain.   Gastrointestinal: Positive for abdominal pain and diarrhea.   Genitourinary: Positive for frequency.   Musculoskeletal: Positive for back pain.   Skin: Negative for rash.   Neurological: Positive for headaches.   Hematological: Negative for adenopathy.   Psychiatric/Behavioral: The patient is not nervous/anxious.        PMFSH: all information reviewed and updated as relevant to today's visit  Objective:      Physical Exam   Constitutional: She is oriented to person, " place, and time. She appears well-developed and well-nourished.   HENT:   Mouth/Throat: No oropharyngeal exudate.   Cardiovascular: Normal rate and normal heart sounds.   Pulmonary/Chest: Effort normal and breath sounds normal. She has no wheezes.   Abdominal: Soft. Bowel sounds are normal. There is no tenderness.   Musculoskeletal: She exhibits no edema or tenderness.   Lymphadenopathy:     She has no cervical adenopathy.   Neurological: She is alert and oriented to person, place, and time. Coordination normal.   Skin: Skin is warm and dry. No rash noted.   Psychiatric: She has a normal mood and affect. Judgment and thought content normal.   Vitals reviewed.      LABS:  WBC   Date Value Ref Range Status   06/20/2019 6.84 3.90 - 12.70 K/uL Final     Hemoglobin   Date Value Ref Range Status   06/20/2019 9.3 (L) 12.0 - 16.0 g/dL Final     POC Hematocrit   Date Value Ref Range Status   05/24/2018 33 (L) 36 - 54 %PCV Final     Hematocrit   Date Value Ref Range Status   06/20/2019 31.2 (L) 37.0 - 48.5 % Final     Platelets   Date Value Ref Range Status   06/20/2019 104 (L) 150 - 350 K/uL Final     Gran # (ANC)   Date Value Ref Range Status   06/20/2019 4.0 1.8 - 7.7 K/uL Final     Gran%   Date Value Ref Range Status   06/20/2019 57.9 38.0 - 73.0 % Final       Chemistry        Component Value Date/Time     06/20/2019 1157    K 4.6 06/20/2019 1157     06/20/2019 1157    CO2 22 (L) 06/20/2019 1157    BUN 14 06/20/2019 1157    CREATININE 0.9 06/20/2019 1157    GLU 87 06/20/2019 1157        Component Value Date/Time    CALCIUM 9.5 06/20/2019 1157    ALKPHOS 48 (L) 06/20/2019 1157    AST 22 06/20/2019 1157    ALT 18 06/20/2019 1157    BILITOT 0.6 06/20/2019 1157    ESTGFRAFRICA >60.0 06/20/2019 1157    EGFRNONAA >60.0 06/20/2019 1157           Assessment:       1. History of colon cancer        Plan:        1. She is doing well with no evidence of recurrence and will return in 1 year with labs    Above care plan  was discussed with patient and all questions were addressed to her satisfaction

## 2019-07-31 RX ORDER — METFORMIN HYDROCHLORIDE 750 MG/1
TABLET, EXTENDED RELEASE ORAL
Qty: 180 TABLET | Refills: 2 | Status: SHIPPED | OUTPATIENT
Start: 2019-07-31 | End: 2020-04-02 | Stop reason: SDUPTHER

## 2019-08-30 DIAGNOSIS — N39.0 RECURRENT UTI: ICD-10-CM

## 2019-08-30 RX ORDER — CEPHALEXIN 250 MG/1
CAPSULE ORAL
Qty: 30 CAPSULE | Refills: 0 | Status: SHIPPED | OUTPATIENT
Start: 2019-08-30 | End: 2019-12-13

## 2019-10-14 ENCOUNTER — OFFICE VISIT (OUTPATIENT)
Dept: UROGYNECOLOGY | Facility: CLINIC | Age: 74
End: 2019-10-14
Payer: MEDICARE

## 2019-10-14 VITALS
HEIGHT: 64 IN | SYSTOLIC BLOOD PRESSURE: 122 MMHG | BODY MASS INDEX: 24.2 KG/M2 | DIASTOLIC BLOOD PRESSURE: 60 MMHG | WEIGHT: 141.75 LBS

## 2019-10-14 DIAGNOSIS — S30.814A ABRASION OF VAGINA, INITIAL ENCOUNTER: ICD-10-CM

## 2019-10-14 DIAGNOSIS — N81.6 RECTOCELE: ICD-10-CM

## 2019-10-14 DIAGNOSIS — Z46.89 PESSARY MAINTENANCE: ICD-10-CM

## 2019-10-14 DIAGNOSIS — N81.11 MIDLINE CYSTOCELE: ICD-10-CM

## 2019-10-14 DIAGNOSIS — N39.46 MIXED STRESS AND URGE URINARY INCONTINENCE: Primary | ICD-10-CM

## 2019-10-14 DIAGNOSIS — N39.0 RECURRENT UTI: ICD-10-CM

## 2019-10-14 PROCEDURE — 99215 OFFICE O/P EST HI 40 MIN: CPT | Mod: PBBFAC | Performed by: NURSE PRACTITIONER

## 2019-10-14 PROCEDURE — 99999 PR PBB SHADOW E&M-EST. PATIENT-LVL V: CPT | Mod: PBBFAC,,, | Performed by: NURSE PRACTITIONER

## 2019-10-14 PROCEDURE — 99214 OFFICE O/P EST MOD 30 MIN: CPT | Mod: S$PBB,,, | Performed by: NURSE PRACTITIONER

## 2019-10-14 PROCEDURE — 99214 PR OFFICE/OUTPT VISIT, EST, LEVL IV, 30-39 MIN: ICD-10-PCS | Mod: S$PBB,,, | Performed by: NURSE PRACTITIONER

## 2019-10-14 PROCEDURE — 99999 PR PBB SHADOW E&M-EST. PATIENT-LVL V: ICD-10-PCS | Mod: PBBFAC,,, | Performed by: NURSE PRACTITIONER

## 2019-10-14 NOTE — PROGRESS NOTES
Urogyn follow up  10/14/2019  .  [facility]   [address] [address]    June Hardik Judd  [id number]  1945 June Hardik Judd is a 74 y.o.  here for a urogyn follow up for recurrent uti.    Last HPI from 07/08/2019  1)  Mixed urinary incontinence, urge > stress:    --initially improved with pessary.    --+MAYKEL with pessary in place  --complains of vaginal spotting on and off since last visit   2)  Vaginal atrophy (dryness):    --using estrogen cream  --using osphena   3)  Incomplete bladder emptying:  --can't tell if pessary helping; does have moderate 2nd void volumes by straining.  --currently being treated for uti   4)  Frequent UTIs (bladder infections):  --control diabetes  --work on improving bladder emptying  --has not been taking 250 mg maintenance dose   5)  Constipation intermittent with loose stool:  --better with 4-5 tsps/day  --not needing stool softener  --continue use miralax daily--see if taking 1/2 dose is sufficient        Changes from last visit:  1)  Mixed urinary incontinence, urge > stress:    --initially improved with pessary.    --+MAYKEL with pessary in place  --complains of pink tinged discharge 3 weeks ago.     2)  Vaginal atrophy (dryness):    --using estrogen cream  --using osphena      3)  Incomplete bladder emptying:  --can't tell if pessary helping; does have moderate 2nd void volumes by straining.  --currently being treated for uti     4)  Frequent UTIs (bladder infections):  --control diabetes  --work on improving bladder emptying  --has not been taking 250 mg maintenance dose     5)  Constipation intermittent with loose stool:  --better with 4-5 tsps/day  --not needing stool softener  --continue use miralax daily--see if taking 1/2 dose is sufficient        11/26/2018  Cysto with Togami  FINDINGS:  Dome, anterior, posterior, lateral walls and bladder base free of urothelial abnormalities. Right and left ureteral orifices in the  normal postion and configuration, both effluxed clear urine.  Bladder neck and urethra were normal    11/15/2018  Retroperitoneal ultrasound  Right kidney: Normal in size measuring 10 cm. Increased cortical echogenicity.  No loss of corticomedullary distinction. Resistive index measures 0.70.  No mass. No renal stone. Mild hydronephrosis.  Left kidney: Normal in size measuring 10.1 cm. Increased cortical echogenicity.  No loss of corticomedullary distinction. Resistive index measures 0.74.  No mass. No renal stone. Mildly prominent collecting system, possibly representing mild hydronephrosis.  Urinary bladder is distended but otherwise unremarkable.  Both ureteral jets are seen.  Impression  Mild bilateral hydronephrosis, right side greater than left, similar to recent CT.  Distended urinary bladder with preservation of bilateral ureteral jets.    Past Medical History:   Diagnosis Date    Abdominal pain     Allergic rhinitis     Arthritis     Blood transfusion     during delivery and     Bowel obstruction     Cervical radiculopathy     followed by dr cloud    Colon cancer     transverse colon; resected; Stage IIA (pT3 pN0 MX)    Diarrhea     Family history of breast cancer     Family history of colon cancer     Fatty liver     GERD (gastroesophageal reflux disease)     History of shingles     Hyperlipidemia     Hypothyroidism     Irritable bowel syndrome     Microscopic colitis     treated     Raynaud phenomenon     Raynaud's disease     Type 2 diabetes mellitus        Past Surgical History:   Procedure Laterality Date    APPENDECTOMY      BACK SURGERY      CARPAL TUNNEL RELEASE      bilateral      SECTION      CHOLECYSTECTOMY  1965    COLECTOMY  2011    Transverse colon resection by Dr. Aguirre    COLONOSCOPY N/A 2017    Procedure: COLONOSCOPY;  Surgeon: Manjit Alvarez MD;  Location: [facility] ([address] [place] [address]);  Service: Endoscopy;  Laterality: N/A;     COLONOSCOPY N/A 6/26/2019    Procedure: COLONOSCOPY;  Surgeon: Ramiro Jefferson MD;  Location: [facility] (4TH FLR);  Service: Endoscopy;  Laterality: N/A;  PM prep    ENDOSCOPIC ULTRASOUND OF UPPER GASTROINTESTINAL TRACT N/A 12/12/2018    Procedure: ULTRASOUND, UPPER GI TRACT, ENDOSCOPIC;  Surgeon: Jose Hess MD;  Location: [facility] ENDO;  Service: Endoscopy;  Laterality: N/A;    ENDOSCOPIC ULTRASOUND OF UPPER GASTROINTESTINAL TRACT N/A 1/23/2019    Procedure: ULTRASOUND, UPPER GI TRACT, ENDOSCOPIC;  Surgeon: Jose Hess MD;  Location: [facility] (2ND FLR);  Service: Endoscopy;  Laterality: N/A;    ESOPHAGOGASTRODUODENOSCOPY N/A 11/16/2018    Procedure: EGD (ESOPHAGOGASTRODUODENOSCOPY);  Surgeon: Angelo Reynolds MD;  Location: [facility] (2ND FLR);  Service: Endoscopy;  Laterality: N/A;    ESOPHAGOGASTRODUODENOSCOPY N/A 6/26/2019    Procedure: EGD (ESOPHAGOGASTRODUODENOSCOPY);  Surgeon: Ramiro Jefferson MD;  Location: [facility] (4TH FLR);  Service: Endoscopy;  Laterality: N/A;    EYE SURGERY      Cataract Removal    HYSTERECTOMY      posterolateral fusion with autograft bone and Eun mineralized bone matrix  2/1/13    at [facility] for lumbar spine stenosis    TOE SURGERY      TONSILLECTOMY      TRIGGER FINGER RELEASE         Current Outpatient Medications   Medication Sig    atorvastatin (LIPITOR) 40 MG tablet TAKE 1 TABLET(40 MG) BY MOUTH EVERY DAY    betamethasone dipropionate (DIPROLENE) 0.05 % ointment Apply every Am x 2 weeks then twice weekly    blood sugar diagnostic Strp Dispense madie contour strips; test blood sugar once daily    blood-glucose meter (CONTOUR METER) kit Please dispense Madie Contour meter and supplies Use as instructed Test Blood sugar once daily    cephALEXin (KEFLEX) 250 MG capsule TAKE 1 CAPSULE(250 MG) BY MOUTH EVERY DAY    conjugated estrogens (PREMARIN) vaginal cream Place 0.5 g vaginally every evening.    diphenhydrAMINE (BENADRYL) 25 mg capsule Take 1  each (25 mg total) by mouth nightly as needed for Itching, Allergies or Insomnia.    docusate sodium (COLACE) 100 MG capsule Take 1 capsule (100 mg total) by mouth 2 (two) times daily.    fluticasone (FLONASE) 50 mcg/actuation nasal spray 2 sprays by Each Nare route daily as needed.     FREESTYLE EFREN READER Misc TEST as directed    FREESTYLE EFREN SENSOR Kit     gabapentin (NEURONTIN) 600 MG tablet Take 1,200 mg by mouth 2 (two) times daily.     hydrocortisone 2.5 % cream     Lactobacillus rhamnosus GG (CULTURELLE) 10 billion cell capsule Take 1 capsule by mouth once daily.    lancets (ONE TOUCH ULTRASOFT LANCETS) Misc Test blood sugar once daily    levothyroxine (SYNTHROID) 100 MCG tablet TAKE 1 TABLET BY MOUTH EVERY MORNING    linagliptin (TRADJENTA) 5 mg Tab tablet Take 1 tablet (5 mg total) by mouth once daily.    magnesium 30 mg Tab Take 500 capsules by mouth. Patient's taking magnesium 500mg QD    meloxicam (MOBIC) 15 MG tablet TAKE 1 TABLET BY MOUTH DAILY WITH FOOD AS NEEDED FOR PAIN OR ARTHRITIS    metFORMIN (GLUCOPHAGE-XR) 750 MG 24 hr tablet TAKE 1 TABLET(750 MG) BY MOUTH TWICE DAILY WITH MEALS    mirabegron (MYRBETRIQ) 50 mg Tb24 Take 1 tablet (50 mg total) by mouth once daily.    ondansetron (ZOFRAN-ODT) 8 MG TbDL     ospemifene (OSPHENA) 60 mg Tab Take 60 mg by mouth once daily.    pantoprazole (PROTONIX) 40 MG tablet Take 1 tablet (40 mg total) by mouth once daily.    traMADol (ULTRAM) 50 mg tablet     VIT A/VIT C/VIT E/ZINC/COPPER (ICAPS AREDS ORAL) Take by mouth 2 (two) times daily.    vitamin D 1000 units Tab Take 185 mg by mouth once daily. D3    acetaZOLAMIDE (DIAMOX) 250 MG tablet Take 1 tablet (250 mg total) by mouth 3 (three) times daily.    azelastine (ASTELIN) 137 mcg nasal spray 1 spray (137 mcg total) by Nasal route 2 (two) times daily.    cholestyramine-aspartame (QUESTRAN/PREVALITE LIGHT) 4 gram Powd Take 4 g by mouth once daily.    nitrofurantoin,  "macrocrystal-monohydrate, (MACROBID) 100 MG capsule Take 1 capsule (100 mg total) by mouth 2 (two) times daily. (Patient not taking: Reported on 10/14/2019)    ondansetron (ZOFRAN) 4 MG tablet Take 1 tablet (4 mg total) by mouth every 8 (eight) hours as needed for Nausea.    polyethylene glycol (GLYCOLAX) 17 gram/dose powder Take 17 g by mouth once daily. (Patient not taking: Reported on 10/14/2019)    triamcinolone acetonide 0.1% (KENALOG) 0.1 % paste apply to affected area with A Q-TIP three times a day     Current Facility-Administered Medications   Medication    amoxicillin capsule 500 mg       Well woman:  Pap test: post ANGEL.  History of abnormal paps: Yes--had ANGEL.  History of STIs:  No  Mammogram: Date of last: 05/2019 normal  Colonoscopy: Date of last: 2017.  Result:  Benign polyps.  Repeat due:  2019.    DEXA:  Date of last: 05/17/2019 normal     ROS:  As per HPI.      Exam  /60 (BP Location: Right arm, Patient Position: Sitting, BP Method: Medium (Manual))   Ht 5' 4" (1.626 m)   Wt 64.3 kg (141 lb 12.1 oz)   LMP  (LMP Unknown)   BMI 24.33 kg/m²   General: alert and oriented, no acute distress  Respiratory: normal respiratory effort  Abd: soft, non-tender, non-distended    Pelvic  Ext. Genitalia:external genitalia no lesions. Atrophic. Normal bartholin's and skeens glands  Vagina: + atrophy.  0.5 cm ulcerated area noted on R vaginal wall near 9 o'clock position. No discharge noted. #2 ring with support pessary in place.   Cervix: absent  Uterus:  absent   Urethra: no masses or tenderness  Urethral meatus: no lesions, caruncle or prolapse.    Pessary removed without difficulty. Washed with soap and water. Given to patient for pessary holiday.    Impression  1. Mixed stress and urge urinary incontinence     2. Midline cystocele     3. Rectocele     4. Abrasion of vagina, initial encounter     5. Pessary maintenance     6. Recurrent UTI       We reviewed the above issues and discussed options for " short-term versus long-term management of her problems.   Plan:     1)  Mixed urinary incontinence, urge > stress:    --control diabetes  --Empty bladder every 3 hours.  Empty well: wait a minute, lean forward on toilet.    --Avoid dietary irritants (see sheet).  Keep diary x 3-5 days to determine your irritants.  --consider PT in future   --URGE: consider medication (Mirabegron due to SBO).  Takes 2-4 weeks to see if will have effect.  For dry mouth: get sour, sugar free lozenge or gum.    --STRESS:  Pessary vs. Sling.   --#2 ring with support pessary holiday     2)  Vaginal atrophy (dryness):  Use 1 gm vaginal estrogen cream nightly x 1 week.    --continue osphena  --may also reduce bladder infection rate     3)  Vulvar irritation:  --use betamethasone Am  twice weekly  --use estrogen cream  Pm  twice weekly  --use moisture barrier ointment    4)  Incomplete bladder emptying:  --resolved with pessary     5)  Frequent UTIs (bladder infections):  --control diabetes  --work on improving bladder emptying  --continue Keflex 250 mg daily  --If you feel like you have a UTI, please call our office so that we can place an order for you to drop off a urine specimen at the closest [facility] lab (we will arrange).                --We will call in antibiotics for you to start right after you drop off specimen.                --In this way, we can determine:                           1)  Do you have a UTI?                            2) If you have a UTI, is it sensitive to the antibiotics we prescribed?   --follow UTI prevention tips (see attached)  --control bowel movements/fecal cross-contamination  --treat vaginal atrophy (dryness)  --empty bladder before and after intercourse  --consider need for further evaluation (pelvic imaging and cystoscopy) if issue persists; 618 CT A/P  normal     6)  Constipation intermittent with loose stool:  Controlling constipation may help bladder urgency/leakage and fiber may better control  cholesterol and blood glucose.  Increas daily fiber.  Increase fiber by 1 tablet every 3-5 days until stool is easy to pass. Take fiber tablets with large glass of water.  When find place where stool is easy to pass and more well-formed, less loose/accidents,  stop and continue at that dose.   Do not exceed 6 tablets/day.    --May also use over the counter stool softener 1-2 x/day.  --continue use miralax daily--see if taking 1/2 dose is sufficient     7)  RTC 1 week      30 minutes were spent in face to face time with this patient  90 % of this time was spent in counseling and/or coordination of care    KENNEDY Petit-BC [facility]  Division of Female Pelvic Medicine and Reconstructive Surgery  Department of Obstetrics & Gynecology

## 2019-10-14 NOTE — PATIENT INSTRUCTIONS
1)  Mixed urinary incontinence, urge > stress:    --control diabetes  --Empty bladder every 3 hours.  Empty well: wait a minute, lean forward on toilet.    --Avoid dietary irritants (see sheet).  Keep diary x 3-5 days to determine your irritants.  --consider PT in future   --URGE: consider medication (Mirabegron due to SBO).  Takes 2-4 weeks to see if will have effect.  For dry mouth: get sour, sugar free lozenge or gum.    --STRESS:  Pessary vs. Sling.   --#2 ring with support pessary holiday     2)  Vaginal atrophy (dryness):  Use 1 gm vaginal estrogen cream nightly x 1 week.    --continue osphena  --may also reduce bladder infection rate     3)  Vulvar irritation:  --use betamethasone Am  twice weekly  --use estrogen cream  Pm  twice weekly  --use moisture barrier ointment    4)  Incomplete bladder emptying:  --resolved with pessary     5)  Frequent UTIs (bladder infections):  --control diabetes  --work on improving bladder emptying  --continue Keflex 250 mg daily  --If you feel like you have a UTI, please call our office so that we can place an order for you to drop off a urine specimen at the closest [facility] lab (we will arrange).                --We will call in antibiotics for you to start right after you drop off specimen.                --In this way, we can determine:                           1)  Do you have a UTI?                            2) If you have a UTI, is it sensitive to the antibiotics we prescribed?   --follow UTI prevention tips (see attached)  --control bowel movements/fecal cross-contamination  --treat vaginal atrophy (dryness)  --empty bladder before and after intercourse  --consider need for further evaluation (pelvic imaging and cystoscopy) if issue persists; 618 CT A/P  normal     6)  Constipation intermittent with loose stool:  Controlling constipation may help bladder urgency/leakage and fiber may better control cholesterol and blood glucose.  Increas daily fiber.  Increase fiber  by 1 tablet every 3-5 days until stool is easy to pass. Take fiber tablets with large glass of water.  When find place where stool is easy to pass and more well-formed, less loose/accidents,  stop and continue at that dose.   Do not exceed 6 tablets/day.    --May also use over the counter stool softener 1-2 x/day.  --continue use miralax daily--see if taking 1/2 dose is sufficient     7)  RTC 1 week

## 2019-10-28 ENCOUNTER — OFFICE VISIT (OUTPATIENT)
Dept: UROGYNECOLOGY | Facility: CLINIC | Age: 74
End: 2019-10-28
Payer: MEDICARE

## 2019-10-28 VITALS
SYSTOLIC BLOOD PRESSURE: 110 MMHG | HEIGHT: 64 IN | WEIGHT: 147.69 LBS | BODY MASS INDEX: 25.21 KG/M2 | DIASTOLIC BLOOD PRESSURE: 60 MMHG

## 2019-10-28 DIAGNOSIS — N39.0 RECURRENT UTI: ICD-10-CM

## 2019-10-28 DIAGNOSIS — N39.46 MIXED STRESS AND URGE URINARY INCONTINENCE: Primary | ICD-10-CM

## 2019-10-28 DIAGNOSIS — Z46.89 PESSARY MAINTENANCE: ICD-10-CM

## 2019-10-28 DIAGNOSIS — N81.11 MIDLINE CYSTOCELE: ICD-10-CM

## 2019-10-28 DIAGNOSIS — N95.2 VAGINAL ATROPHY: ICD-10-CM

## 2019-10-28 DIAGNOSIS — N81.6 RECTOCELE: ICD-10-CM

## 2019-10-28 PROCEDURE — 99213 PR OFFICE/OUTPT VISIT, EST, LEVL III, 20-29 MIN: ICD-10-PCS | Mod: S$PBB,,, | Performed by: NURSE PRACTITIONER

## 2019-10-28 PROCEDURE — 99999 PR PBB SHADOW E&M-EST. PATIENT-LVL V: ICD-10-PCS | Mod: PBBFAC,,, | Performed by: NURSE PRACTITIONER

## 2019-10-28 PROCEDURE — 99999 PR PBB SHADOW E&M-EST. PATIENT-LVL V: CPT | Mod: PBBFAC,,, | Performed by: NURSE PRACTITIONER

## 2019-10-28 PROCEDURE — 99215 OFFICE O/P EST HI 40 MIN: CPT | Mod: PBBFAC | Performed by: NURSE PRACTITIONER

## 2019-10-28 PROCEDURE — 99213 OFFICE O/P EST LOW 20 MIN: CPT | Mod: S$PBB,,, | Performed by: NURSE PRACTITIONER

## 2019-10-28 NOTE — PROGRESS NOTES
Urogyn follow up  10/28/2019  .  [facility] 4   [address] [address]    June Hardik Judd  [id number]  1945 June Hardik Judd is a 74 y.o.  here for a urogyn follow up for recurrent uti and vaginal abrasion.    Last HPI from 10/14/2019  1)  Mixed urinary incontinence, urge > stress:    --initially improved with pessary.    --+MAYKEL with pessary in place  --complains of pink tinged discharge 3 weeks ago.     2)  Vaginal atrophy (dryness):    --using estrogen cream  --using osphena      3)  Incomplete bladder emptying:  --can't tell if pessary helping; does have moderate 2nd void volumes by straining.  --currently being treated for uti     4)  Frequent UTIs (bladder infections):  --control diabetes  --work on improving bladder emptying  --has not been taking 250 mg maintenance dose     5)  Constipation intermittent with loose stool:  --better with 4-5 tsps/day  --not needing stool softener  --continue use miralax daily--see if taking 1/2 dose is sufficient          Changes from last visit:  1)  Mixed urinary incontinence, urge > stress:    --initially improved with pessary.    --discharge has resolved.     2)  Vaginal atrophy (dryness):    --using estrogen cream  --using osphena      3)  Incomplete bladder emptying:  --can't tell if pessary helping; does have moderate 2nd void volumes by straining.       4)  Frequent UTIs (bladder infections):  --control diabetes  --work on improving bladder emptying  --has not been taking 250 mg maintenance dose     5)  Constipation intermittent with loose stool:  --better with 4-5 tsps/day  --not needing stool softener  --continue use miralax daily--see if taking 1/2 dose is sufficient        11/26/2018  Cysto with Togami  FINDINGS:  Dome, anterior, posterior, lateral walls and bladder base free of urothelial abnormalities. Right and left ureteral orifices in the normal postion and configuration, both effluxed clear urine.   Bladder neck and urethra were normal    11/15/2018  Retroperitoneal ultrasound  Right kidney: Normal in size measuring 10 cm. Increased cortical echogenicity.  No loss of corticomedullary distinction. Resistive index measures 0.70.  No mass. No renal stone. Mild hydronephrosis.  Left kidney: Normal in size measuring 10.1 cm. Increased cortical echogenicity.  No loss of corticomedullary distinction. Resistive index measures 0.74.  No mass. No renal stone. Mildly prominent collecting system, possibly representing mild hydronephrosis.  Urinary bladder is distended but otherwise unremarkable.  Both ureteral jets are seen.  Impression  Mild bilateral hydronephrosis, right side greater than left, similar to recent CT.  Distended urinary bladder with preservation of bilateral ureteral jets.    Past Medical History:   Diagnosis Date    Abdominal pain     Allergic rhinitis     Arthritis     Blood transfusion     during delivery and     Bowel obstruction     Cervical radiculopathy     followed by dr cloud    Colon cancer     transverse colon; resected; Stage IIA (pT3 pN0 MX)    Diarrhea     Family history of breast cancer     Family history of colon cancer     Fatty liver     GERD (gastroesophageal reflux disease)     History of shingles     Hyperlipidemia     Hypothyroidism     Irritable bowel syndrome     Microscopic colitis     treated     Raynaud phenomenon     Raynaud's disease     Type 2 diabetes mellitus        Past Surgical History:   Procedure Laterality Date    APPENDECTOMY      BACK SURGERY      CARPAL TUNNEL RELEASE      bilateral      SECTION      CHOLECYSTECTOMY  1965    COLECTOMY  2011    Transverse colon resection by Dr. Aguirre    COLONOSCOPY N/A 2017    Procedure: COLONOSCOPY;  Surgeon: Manjit Alvarez MD;  Location: [facility] ([address] [place] [address]);  Service: Endoscopy;  Laterality: N/A;    COLONOSCOPY N/A 2019    Procedure: COLONOSCOPY;  Surgeon: Ramiro  GIO Jefferson MD;  Location: [facility] (4TH FLR);  Service: Endoscopy;  Laterality: N/A;  PM prep    ENDOSCOPIC ULTRASOUND OF UPPER GASTROINTESTINAL TRACT N/A 12/12/2018    Procedure: ULTRASOUND, UPPER GI TRACT, ENDOSCOPIC;  Surgeon: Jose Hess MD;  Location: [facility] ENDO;  Service: Endoscopy;  Laterality: N/A;    ENDOSCOPIC ULTRASOUND OF UPPER GASTROINTESTINAL TRACT N/A 1/23/2019    Procedure: ULTRASOUND, UPPER GI TRACT, ENDOSCOPIC;  Surgeon: Jose Hess MD;  Location: [facility] (2ND FLR);  Service: Endoscopy;  Laterality: N/A;    ESOPHAGOGASTRODUODENOSCOPY N/A 11/16/2018    Procedure: EGD (ESOPHAGOGASTRODUODENOSCOPY);  Surgeon: Angelo Reynolds MD;  Location: [facility] (2ND FLR);  Service: Endoscopy;  Laterality: N/A;    ESOPHAGOGASTRODUODENOSCOPY N/A 6/26/2019    Procedure: EGD (ESOPHAGOGASTRODUODENOSCOPY);  Surgeon: Ramiro Jefferson MD;  Location: [facility] (4TH FLR);  Service: Endoscopy;  Laterality: N/A;    EYE SURGERY      Cataract Removal    HYSTERECTOMY      posterolateral fusion with autograft bone and [place] mineralized bone matrix  2/1/13    at [facility] for lumbar spine stenosis    TOE SURGERY      TONSILLECTOMY      TRIGGER FINGER RELEASE         Current Outpatient Medications   Medication Sig    acetaZOLAMIDE (DIAMOX) 250 MG tablet Take 1 tablet (250 mg total) by mouth 3 (three) times daily.    atorvastatin (LIPITOR) 40 MG tablet TAKE 1 TABLET(40 MG) BY MOUTH EVERY DAY    betamethasone dipropionate (DIPROLENE) 0.05 % ointment Apply every Am x 2 weeks then twice weekly    blood sugar diagnostic Strp Dispense madie contour strips; test blood sugar once daily    blood-glucose meter (CONTOUR METER) kit Please dispense Madie Contour meter and supplies Use as instructed Test Blood sugar once daily    cephALEXin (KEFLEX) 250 MG capsule TAKE 1 CAPSULE(250 MG) BY MOUTH EVERY DAY    conjugated estrogens (PREMARIN) vaginal cream Place 0.5 g vaginally every evening.    docusate sodium  (COLACE) 100 MG capsule Take 1 capsule (100 mg total) by mouth 2 (two) times daily.    fluticasone (FLONASE) 50 mcg/actuation nasal spray 2 sprays by Each Nare route daily as needed.     FREESTYLE EFREN 10 DAY SENSOR Kit Check blood glucose every day    FREESTYLE EFREN READER Misc TEST as directed    gabapentin (NEURONTIN) 600 MG tablet Take 1,200 mg by mouth 2 (two) times daily.     lancets (ONE TOUCH ULTRASOFT LANCETS) Misc Test blood sugar once daily    levothyroxine (SYNTHROID) 100 MCG tablet TAKE 1 TABLET BY MOUTH EVERY MORNING    linagliptin (TRADJENTA) 5 mg Tab tablet Take 1 tablet (5 mg total) by mouth once daily.    magnesium 30 mg Tab Take 500 capsules by mouth. Patient's taking magnesium 500mg QD    meloxicam (MOBIC) 15 MG tablet TAKE 1 TABLET BY MOUTH DAILY WITH FOOD AS NEEDED FOR PAIN OR ARTHRITIS    metFORMIN (GLUCOPHAGE-XR) 750 MG 24 hr tablet TAKE 1 TABLET(750 MG) BY MOUTH TWICE DAILY WITH MEALS    mirabegron (MYRBETRIQ) 50 mg Tb24 Take 1 tablet (50 mg total) by mouth once daily.    ondansetron (ZOFRAN) 4 MG tablet Take 1 tablet (4 mg total) by mouth every 8 (eight) hours as needed for Nausea.    ondansetron (ZOFRAN-ODT) 8 MG TbDL     ospemifene (OSPHENA) 60 mg Tab Take 60 mg by mouth once daily.    pantoprazole (PROTONIX) 40 MG tablet Take 1 tablet (40 mg total) by mouth once daily.    traMADol (ULTRAM) 50 mg tablet     vitamin D 1000 units Tab Take 185 mg by mouth once daily. D3    azelastine (ASTELIN) 137 mcg nasal spray 1 spray (137 mcg total) by Nasal route 2 (two) times daily.    cholestyramine-aspartame (QUESTRAN/PREVALITE LIGHT) 4 gram Powd Take 4 g by mouth once daily.    diphenhydrAMINE (BENADRYL) 25 mg capsule Take 1 each (25 mg total) by mouth nightly as needed for Itching, Allergies or Insomnia. (Patient not taking: Reported on 10/28/2019)    hydrocortisone 2.5 % cream     Lactobacillus rhamnosus GG (CULTURELLE) 10 billion cell capsule Take 1 capsule by mouth once  "daily.    nitrofurantoin, macrocrystal-monohydrate, (MACROBID) 100 MG capsule Take 1 capsule (100 mg total) by mouth 2 (two) times daily. (Patient not taking: Reported on 10/14/2019)    polyethylene glycol (GLYCOLAX) 17 gram/dose powder Take 17 g by mouth once daily. (Patient not taking: Reported on 10/14/2019)    triamcinolone acetonide 0.1% (KENALOG) 0.1 % paste apply to affected area with A Q-TIP three times a day    VIT A/VIT C/VIT E/ZINC/COPPER (ICAPS AREDS ORAL) Take by mouth 2 (two) times daily.     Current Facility-Administered Medications   Medication    amoxicillin capsule 500 mg       Well woman:  Pap test: post ANGEL.  History of abnormal paps: Yes--had ANGEL.  History of STIs:  No  Mammogram: Date of last: 05/2019 normal  Colonoscopy: Date of last: 2017.  Result:  Benign polyps.  Repeat due:  2019.    DEXA:  Date of last: 05/17/2019 normal     ROS:  As per HPI.      Exam  /60 (BP Location: Right arm, Patient Position: Sitting, BP Method: Medium (Manual))   Ht 5' 4" (1.626 m)   Wt 67 kg (147 lb 11.3 oz)   LMP  (LMP Unknown)   BMI 25.35 kg/m²   General: alert and oriented, no acute distress  Respiratory: normal respiratory effort  Abd: soft, non-tender, non-distended    Pelvic  Ext. Genitalia:external genitalia no lesions. Atrophic. Normal bartholin's and skeens glands  Vagina: + atrophy.  0.5 cm ulcerated area noted on R vaginal wall near 9 o'clock position. No discharge noted. #2 ring with support pessary in place.   Cervix: absent  Uterus:  absent   Urethra: no masses or tenderness  Urethral meatus: no lesions, caruncle or prolapse.    Pessary removed without difficulty. Washed with soap and water. Given to patient for pessary holiday.    Impression  1. Mixed stress and urge urinary incontinence     2. Midline cystocele     3. Rectocele     4. Recurrent UTI     5. Vaginal atrophy     6. Pessary maintenance       We reviewed the above issues and discussed options for short-term versus " long-term management of her problems.   Plan:     1)  Mixed urinary incontinence, urge > stress:    --control diabetes  --Empty bladder every 3 hours.  Empty well: wait a minute, lean forward on toilet.    --Avoid dietary irritants (see sheet).  Keep diary x 3-5 days to determine your irritants.  --consider PT in future   --URGE: consider medication (Mirabegron due to SBO).  Takes 2-4 weeks to see if will have effect.  For dry mouth: get sour, sugar free lozenge or gum.    --STRESS:  Pessary vs. Sling.   --#2 ring with support pessary    2)  Vaginal atrophy (dryness):  Use 1 gm vaginal estrogen cream nightly x 1 week.    --continue osphena  --may also reduce bladder infection rate     3)  Vulvar irritation:  --use betamethasone Am  twice weekly  --use estrogen cream  Pm  twice weekly  --use moisture barrier ointment    4)  Incomplete bladder emptying:  --resolved with pessary     5)  Frequent UTIs (bladder infections):  --control diabetes  --work on improving bladder emptying  --continue Keflex 250 mg daily  --If you feel like you have a UTI, please call our office so that we can place an order for you to drop off a urine specimen at the closest [facility] lab (we will arrange).                --We will call in antibiotics for you to start right after you drop off specimen.                --In this way, we can determine:                           1)  Do you have a UTI?                            2) If you have a UTI, is it sensitive to the antibiotics we prescribed?   --follow UTI prevention tips (see attached)  --control bowel movements/fecal cross-contamination  --treat vaginal atrophy (dryness)  --empty bladder before and after intercourse  --consider need for further evaluation (pelvic imaging and cystoscopy) if issue persists; 618 CT A/P  normal     6)  Constipation intermittent with loose stool:  Controlling constipation may help bladder urgency/leakage and fiber may better control cholesterol and blood  glucose.  Increas daily fiber.  Increase fiber by 1 tablet every 3-5 days until stool is easy to pass. Take fiber tablets with large glass of water.  When find place where stool is easy to pass and more well-formed, less loose/accidents,  stop and continue at that dose.   Do not exceed 6 tablets/day.    --May also use over the counter stool softener 1-2 x/day.  --continue use miralax daily--see if taking 1/2 dose is sufficient     7)  RTC 3 months.       30 minutes were spent in face to face time with this patient  90 % of this time was spent in counseling and/or coordination of care    KENNEDY Petit-BC [facility]  Division of Female Pelvic Medicine and Reconstructive Surgery  Department of Obstetrics & Gynecology

## 2019-10-28 NOTE — PATIENT INSTRUCTIONS
1)  Mixed urinary incontinence, urge > stress:    --control diabetes  --Empty bladder every 3 hours.  Empty well: wait a minute, lean forward on toilet.    --Avoid dietary irritants (see sheet).  Keep diary x 3-5 days to determine your irritants.  --consider PT in future   --URGE: consider medication (Mirabegron due to SBO).  Takes 2-4 weeks to see if will have effect.  For dry mouth: get sour, sugar free lozenge or gum.    --STRESS:  Pessary vs. Sling.   --#2 ring with support pessary    2)  Vaginal atrophy (dryness):  Use 1 gm vaginal estrogen cream nightly x 1 week.    --continue osphena  --may also reduce bladder infection rate     3)  Vulvar irritation:  --use betamethasone Am  twice weekly  --use estrogen cream  Pm  twice weekly  --use moisture barrier ointment    4)  Incomplete bladder emptying:  --resolved with pessary     5)  Frequent UTIs (bladder infections):  --control diabetes  --work on improving bladder emptying  --continue Keflex 250 mg daily  --If you feel like you have a UTI, please call our office so that we can place an order for you to drop off a urine specimen at the closest [facility] lab (we will arrange).                --We will call in antibiotics for you to start right after you drop off specimen.                --In this way, we can determine:                           1)  Do you have a UTI?                            2) If you have a UTI, is it sensitive to the antibiotics we prescribed?   --follow UTI prevention tips (see attached)  --control bowel movements/fecal cross-contamination  --treat vaginal atrophy (dryness)  --empty bladder before and after intercourse  --consider need for further evaluation (pelvic imaging and cystoscopy) if issue persists; 618 CT A/P  normal     6)  Constipation intermittent with loose stool:  Controlling constipation may help bladder urgency/leakage and fiber may better control cholesterol and blood glucose.  Increas daily fiber.  Increase fiber by 1  tablet every 3-5 days until stool is easy to pass. Take fiber tablets with large glass of water.  When find place where stool is easy to pass and more well-formed, less loose/accidents,  stop and continue at that dose.   Do not exceed 6 tablets/day.    --May also use over the counter stool softener 1-2 x/day.  --continue use miralax daily--see if taking 1/2 dose is sufficient     7)  RTC 3 months.

## 2019-11-25 DIAGNOSIS — N95.2 VAGINAL ATROPHY: ICD-10-CM

## 2019-11-25 RX ORDER — CONJUGATED ESTROGENS 0.62 MG/G
CREAM VAGINAL
Qty: 30 G | Refills: 3 | Status: SHIPPED | OUTPATIENT
Start: 2019-11-25 | End: 2020-04-03

## 2019-12-05 ENCOUNTER — LAB VISIT (OUTPATIENT)
Dept: LAB | Facility: OTHER | Age: 74
End: 2019-12-05
Attending: INTERNAL MEDICINE
Payer: MEDICARE

## 2019-12-05 DIAGNOSIS — D64.9 ANEMIA, UNSPECIFIED TYPE: ICD-10-CM

## 2019-12-05 DIAGNOSIS — E03.4 HYPOTHYROIDISM DUE TO ACQUIRED ATROPHY OF THYROID: ICD-10-CM

## 2019-12-05 LAB
ALBUMIN SERPL BCP-MCNC: 3.7 G/DL (ref 3.5–5.2)
ALP SERPL-CCNC: 48 U/L (ref 55–135)
ALT SERPL W/O P-5'-P-CCNC: 19 U/L (ref 10–44)
ANION GAP SERPL CALC-SCNC: 12 MMOL/L (ref 8–16)
AST SERPL-CCNC: 28 U/L (ref 10–40)
BASOPHILS # BLD AUTO: 0.02 K/UL (ref 0–0.2)
BASOPHILS NFR BLD: 0.4 % (ref 0–1.9)
BILIRUB SERPL-MCNC: 0.7 MG/DL (ref 0.1–1)
BUN SERPL-MCNC: 12 MG/DL (ref 8–23)
CALCIUM SERPL-MCNC: 9.5 MG/DL (ref 8.7–10.5)
CHLORIDE SERPL-SCNC: 102 MMOL/L (ref 95–110)
CO2 SERPL-SCNC: 23 MMOL/L (ref 23–29)
CREAT SERPL-MCNC: 1 MG/DL (ref 0.5–1.4)
DIFFERENTIAL METHOD: ABNORMAL
EOSINOPHIL # BLD AUTO: 0.1 K/UL (ref 0–0.5)
EOSINOPHIL NFR BLD: 1.4 % (ref 0–8)
ERYTHROCYTE [DISTWIDTH] IN BLOOD BY AUTOMATED COUNT: 17.6 % (ref 11.5–14.5)
EST. GFR  (AFRICAN AMERICAN): >60 ML/MIN/1.73 M^2
EST. GFR  (NON AFRICAN AMERICAN): 55 ML/MIN/1.73 M^2
FERRITIN SERPL-MCNC: 20 NG/ML (ref 20–300)
GLUCOSE SERPL-MCNC: 152 MG/DL (ref 70–110)
HCT VFR BLD AUTO: 33.4 % (ref 37–48.5)
HGB BLD-MCNC: 9.6 G/DL (ref 12–16)
IMM GRANULOCYTES # BLD AUTO: 0.01 K/UL (ref 0–0.04)
IMM GRANULOCYTES NFR BLD AUTO: 0.2 % (ref 0–0.5)
IRON SERPL-MCNC: 50 UG/DL (ref 30–160)
LYMPHOCYTES # BLD AUTO: 1.3 K/UL (ref 1–4.8)
LYMPHOCYTES NFR BLD: 22.8 % (ref 18–48)
MCH RBC QN AUTO: 24.1 PG (ref 27–31)
MCHC RBC AUTO-ENTMCNC: 28.7 G/DL (ref 32–36)
MCV RBC AUTO: 84 FL (ref 82–98)
MONOCYTES # BLD AUTO: 0.6 K/UL (ref 0.3–1)
MONOCYTES NFR BLD: 9.9 % (ref 4–15)
NEUTROPHILS # BLD AUTO: 3.7 K/UL (ref 1.8–7.7)
NEUTROPHILS NFR BLD: 65.3 % (ref 38–73)
NRBC BLD-RTO: 0 /100 WBC
PLATELET # BLD AUTO: 109 K/UL (ref 150–350)
PMV BLD AUTO: 11 FL (ref 9.2–12.9)
POTASSIUM SERPL-SCNC: 4.2 MMOL/L (ref 3.5–5.1)
PROT SERPL-MCNC: 7 G/DL (ref 6–8.4)
RBC # BLD AUTO: 3.99 M/UL (ref 4–5.4)
SATURATED IRON: 8 % (ref 20–50)
SODIUM SERPL-SCNC: 137 MMOL/L (ref 136–145)
TOTAL IRON BINDING CAPACITY: 616 UG/DL (ref 250–450)
TRANSFERRIN SERPL-MCNC: 416 MG/DL (ref 200–375)
WBC # BLD AUTO: 5.67 K/UL (ref 3.9–12.7)

## 2019-12-05 PROCEDURE — 83540 ASSAY OF IRON: CPT

## 2019-12-05 PROCEDURE — 82728 ASSAY OF FERRITIN: CPT

## 2019-12-05 PROCEDURE — 36415 COLL VENOUS BLD VENIPUNCTURE: CPT

## 2019-12-05 PROCEDURE — 85025 COMPLETE CBC W/AUTO DIFF WBC: CPT

## 2019-12-05 PROCEDURE — 80053 COMPREHEN METABOLIC PANEL: CPT

## 2019-12-11 ENCOUNTER — LAB VISIT (OUTPATIENT)
Dept: LAB | Facility: HOSPITAL | Age: 74
End: 2019-12-11
Attending: INTERNAL MEDICINE
Payer: MEDICARE

## 2019-12-11 DIAGNOSIS — N39.0 FREQUENT UTI: ICD-10-CM

## 2019-12-11 LAB
BACTERIA #/AREA URNS AUTO: ABNORMAL /HPF
BILIRUB UR QL STRIP: NEGATIVE
CLARITY UR REFRACT.AUTO: ABNORMAL
COLOR UR AUTO: YELLOW
GLUCOSE UR QL STRIP: NEGATIVE
HGB UR QL STRIP: NEGATIVE
KETONES UR QL STRIP: NEGATIVE
LEUKOCYTE ESTERASE UR QL STRIP: ABNORMAL
MICROSCOPIC COMMENT: ABNORMAL
NITRITE UR QL STRIP: NEGATIVE
PH UR STRIP: 5 [PH] (ref 5–8)
PROT UR QL STRIP: NEGATIVE
RBC #/AREA URNS AUTO: 4 /HPF (ref 0–4)
SP GR UR STRIP: 1.02 (ref 1–1.03)
SQUAMOUS #/AREA URNS AUTO: 1 /HPF
URN SPEC COLLECT METH UR: ABNORMAL
WBC #/AREA URNS AUTO: >100 /HPF (ref 0–5)
WBC CLUMPS UR QL AUTO: ABNORMAL

## 2019-12-11 PROCEDURE — 87186 SC STD MICRODIL/AGAR DIL: CPT

## 2019-12-11 PROCEDURE — 87086 URINE CULTURE/COLONY COUNT: CPT

## 2019-12-11 PROCEDURE — 87077 CULTURE AEROBIC IDENTIFY: CPT

## 2019-12-11 PROCEDURE — 81001 URINALYSIS AUTO W/SCOPE: CPT

## 2019-12-11 PROCEDURE — 87088 URINE BACTERIA CULTURE: CPT

## 2019-12-13 LAB — BACTERIA UR CULT: ABNORMAL

## 2020-01-17 ENCOUNTER — TELEPHONE (OUTPATIENT)
Dept: GASTROENTEROLOGY | Facility: CLINIC | Age: 75
End: 2020-01-17

## 2020-01-17 ENCOUNTER — HOSPITAL ENCOUNTER (EMERGENCY)
Facility: HOSPITAL | Age: 75
Discharge: HOME | End: 2020-01-17
Attending: EMERGENCY MEDICINE
Payer: MEDICARE

## 2020-01-17 VITALS
RESPIRATION RATE: 18 BRPM | WEIGHT: 145 LBS | HEART RATE: 58 BPM | HEIGHT: 64 IN | BODY MASS INDEX: 24.75 KG/M2 | DIASTOLIC BLOOD PRESSURE: 63 MMHG | OXYGEN SATURATION: 100 % | SYSTOLIC BLOOD PRESSURE: 134 MMHG | TEMPERATURE: 97 F

## 2020-01-17 DIAGNOSIS — S01.91XA LACERATION OF HEAD WITHOUT FOREIGN BODY, UNSPECIFIED PART OF HEAD, INITIAL ENCOUNTER: Primary | ICD-10-CM
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
## 2020-01-17 LAB
ALBUMIN SERPL BCP-MCNC: 3.7 G/DL (ref 3.5–5.2)
ALP SERPL-CCNC: 46 U/L (ref 55–135)
ALT SERPL W/O P-5'-P-CCNC: 23 U/L (ref 10–44)
ANION GAP SERPL CALC-SCNC: 10 MMOL/L (ref 8–16)
AST SERPL-CCNC: 31 U/L (ref 10–40)
BASOPHILS # BLD AUTO: 0.05 K/UL (ref 0–0.2)
BASOPHILS NFR BLD: 1.1 % (ref 0–1.9)
BILIRUB SERPL-MCNC: 0.6 MG/DL (ref 0.1–1)
BILIRUB UR QL STRIP: NEGATIVE
BUN SERPL-MCNC: 13 MG/DL (ref 8–23)
CALCIUM SERPL-MCNC: 9.5 MG/DL (ref 8.7–10.5)
CHLORIDE SERPL-SCNC: 106 MMOL/L (ref 95–110)
CLARITY UR REFRACT.AUTO: CLEAR
CO2 SERPL-SCNC: 23 MMOL/L (ref 23–29)
COLOR UR AUTO: YELLOW
CREAT SERPL-MCNC: 0.9 MG/DL (ref 0.5–1.4)
DIFFERENTIAL METHOD: ABNORMAL
EOSINOPHIL # BLD AUTO: 0.4 K/UL (ref 0–0.5)
EOSINOPHIL NFR BLD: 8 % (ref 0–8)
ERYTHROCYTE [DISTWIDTH] IN BLOOD BY AUTOMATED COUNT: 20.7 % (ref 11.5–14.5)
EST. GFR  (AFRICAN AMERICAN): >60 ML/MIN/1.73 M^2
EST. GFR  (NON AFRICAN AMERICAN): >60 ML/MIN/1.73 M^2
ESTIMATED AVG GLUCOSE: 163 MG/DL (ref 68–131)
GLUCOSE SERPL-MCNC: 215 MG/DL (ref 70–110)
GLUCOSE UR QL STRIP: NEGATIVE
HBA1C MFR BLD HPLC: 7.3 % (ref 4–5.6)
HCT VFR BLD AUTO: 35.5 % (ref 37–48.5)
HGB BLD-MCNC: 11.1 G/DL (ref 12–16)
HGB UR QL STRIP: NEGATIVE
IMM GRANULOCYTES # BLD AUTO: 0.02 K/UL (ref 0–0.04)
IMM GRANULOCYTES NFR BLD AUTO: 0.4 % (ref 0–0.5)
KETONES UR QL STRIP: NEGATIVE
LACTATE SERPL-SCNC: 2.7 MMOL/L (ref 0.5–2.2)
LEUKOCYTE ESTERASE UR QL STRIP: NEGATIVE
LIPASE SERPL-CCNC: 46 U/L (ref 4–60)
LYMPHOCYTES # BLD AUTO: 1.4 K/UL (ref 1–4.8)
LYMPHOCYTES NFR BLD: 30.2 % (ref 18–48)
MCH RBC QN AUTO: 28.2 PG (ref 27–31)
MCHC RBC AUTO-ENTMCNC: 31.3 G/DL (ref 32–36)
MCV RBC AUTO: 90 FL (ref 82–98)
MONOCYTES # BLD AUTO: 0.5 K/UL (ref 0.3–1)
MONOCYTES NFR BLD: 9.9 % (ref 4–15)
NEUTROPHILS # BLD AUTO: 2.4 K/UL (ref 1.8–7.7)
NEUTROPHILS NFR BLD: 50.4 % (ref 38–73)
NITRITE UR QL STRIP: NEGATIVE
NRBC BLD-RTO: 0 /100 WBC
PH UR STRIP: 6 [PH] (ref 5–8)
PLATELET # BLD AUTO: 79 K/UL (ref 150–350)
PMV BLD AUTO: 10.7 FL (ref 9.2–12.9)
POCT GLUCOSE: 226 MG/DL (ref 70–110)
POTASSIUM SERPL-SCNC: 4.5 MMOL/L (ref 3.5–5.1)
PROT SERPL-MCNC: 6.9 G/DL (ref 6–8.4)
PROT UR QL STRIP: NEGATIVE
RBC # BLD AUTO: 3.93 M/UL (ref 4–5.4)
SODIUM SERPL-SCNC: 139 MMOL/L (ref 136–145)
SP GR UR STRIP: 1.01 (ref 1–1.03)
URN SPEC COLLECT METH UR: NORMAL
WBC # BLD AUTO: 4.74 K/UL (ref 3.9–12.7)

## 2020-01-17 PROCEDURE — 99285 PR EMERGENCY DEPT VISIT,LEVEL V: ICD-10-PCS | Mod: 25,GC,, | Performed by: EMERGENCY MEDICINE

## 2020-01-17 PROCEDURE — 96374 THER/PROPH/DIAG INJ IV PUSH: CPT

## 2020-01-17 PROCEDURE — 99285 EMERGENCY DEPT VISIT HI MDM: CPT | Mod: 25,GC,, | Performed by: EMERGENCY MEDICINE

## 2020-01-17 PROCEDURE — 83690 ASSAY OF LIPASE: CPT

## 2020-01-17 PROCEDURE — 25500020 PHARM REV CODE 255: Performed by: EMERGENCY MEDICINE

## 2020-01-17 PROCEDURE — 25000003 PHARM REV CODE 250: Performed by: EMERGENCY MEDICINE

## 2020-01-17 PROCEDURE — 83036 HEMOGLOBIN GLYCOSYLATED A1C: CPT

## 2020-01-17 PROCEDURE — 81003 URINALYSIS AUTO W/O SCOPE: CPT

## 2020-01-17 PROCEDURE — 63600175 PHARM REV CODE 636 W HCPCS: Performed by: EMERGENCY MEDICINE

## 2020-01-17 PROCEDURE — 83605 ASSAY OF LACTIC ACID: CPT

## 2020-01-17 PROCEDURE — 85025 COMPLETE CBC W/AUTO DIFF WBC: CPT

## 2020-01-17 PROCEDURE — 80053 COMPREHEN METABOLIC PANEL: CPT

## 2020-01-17 PROCEDURE — 12002 RPR S/N/AX/GEN/TRNK2.6-7.5CM: CPT | Mod: ,,, | Performed by: EMERGENCY MEDICINE

## 2020-01-17 PROCEDURE — 12002 PR RESUP NPTERF WND BODY 2.6-7.5 CM: ICD-10-PCS | Mod: ,,, | Performed by: EMERGENCY MEDICINE

## 2020-01-17 PROCEDURE — 99285 EMERGENCY DEPT VISIT HI MDM: CPT | Mod: 25

## 2020-01-17 RX ORDER — LIDOCAINE HYDROCHLORIDE 10 MG/ML
10 INJECTION, SOLUTION EPIDURAL; INFILTRATION; INTRACAUDAL; PERINEURAL
Status: COMPLETED | OUTPATIENT
Start: 2020-01-17 | End: 2020-01-17

## 2020-01-17 RX ORDER — ACETAMINOPHEN 500 MG
1000 TABLET ORAL
Status: COMPLETED | OUTPATIENT
Start: 2020-01-17 | End: 2020-01-17

## 2020-01-17 RX ORDER — ONDANSETRON 2 MG/ML
4 INJECTION INTRAMUSCULAR; INTRAVENOUS
Status: COMPLETED | OUTPATIENT
Start: 2020-01-17 | End: 2020-01-17

## 2020-01-17 RX ADMIN — LIDOCAINE HYDROCHLORIDE 100 MG: 10 INJECTION, SOLUTION EPIDURAL; INFILTRATION; INTRACAUDAL; PERINEURAL at 10:01

## 2020-01-17 RX ADMIN — IOHEXOL 75 ML: 350 INJECTION, SOLUTION INTRAVENOUS at 01:01

## 2020-01-17 RX ADMIN — ACETAMINOPHEN 1000 MG: 500 TABLET ORAL at 10:01

## 2020-01-17 RX ADMIN — ONDANSETRON 4 MG: 2 INJECTION INTRAMUSCULAR; INTRAVENOUS at 11:01

## 2020-01-17 NOTE — TELEPHONE ENCOUNTER
Left  for Danielle ( pt daughter) instructing her to call back      ----- Message from Johanny Majano sent at 1/17/2020 11:05 AM CST -----  Contact: Danielle pt daughter#[phone]  She states that pt has been admitted in [facility] for ongoing gastro issues.Please call

## 2020-01-17 NOTE — ED NOTES
"Approximately 1" laceration to the posterior side of the head; bleeding controlled.  Pt states pain in the lower spine w/o obvious trauma.   "

## 2020-01-17 NOTE — TELEPHONE ENCOUNTER
Spoke to Danielle (pt daughter) and she said the pt is in the ED because she fell and now has a gash at the back of head but while in the ED she mentioned the pt is having GI issues but doesn't want to specify. Informed Danielle that since her mom is admitted and she will see the GI provider that's on service. Danielle said that the pt said Dr. Betts told her that if she's ever having any issues to contact him, informed Danielle that a message will be sent to Dr. Betts.

## 2020-01-17 NOTE — ED NOTES
Discharge instructions and follow up reviewed with the pt.  Pt will follow up with GI and PCP within 10 days and will return to the ER if symptoms worsen or laceration shows s/s of infection or bleeding.  Pt states no other questions or concerns at this time.  VSS and pt in NAD at discharge.  All needs met.

## 2020-01-17 NOTE — ED PROVIDER NOTES
"Encounter Date: 1/17/2020       History     Chief Complaint   Patient presents with    Fall     States attempting to sit down and chair moved from under her. Reports laceration to back of head. NO LOC Denies Blood thinners     Patient is a 76 yo F presenting with cc of a fall. Patient has HLD, Raynaud's disease, Hypothyroidism, GERD, IBS, and DMT2. Patient had a witnessed fall. She was getting a manicure and sat down in a stool with wheels when she fell backward and sustained trauma to the back of her head with the chair. She then fell to the floor and hit her lumbar spine and hit her head again. Denied LOC, CP, SOB, dizziness, LH, confusion, nausea, hot flashes prior to the fall. Denies history of seizures. After the fall she admitted to global head pain and some blurry vision but denied nausea, extremity tingling/numbness, or hip pain. She noted a deep laceration on the back of her head. Fall was witnessed. She reports lumbar spine that is at baseline to her typical back pain. She is also complaining of some abdominal pain that is intermittent describing it as "bowel pain". She is having normal BMs and passing flatus without issue and denies N/V. Described her stomach as distended. This is happening over the past week. She is concerned as she has a history of colon cancer and bowel surgeries. Otherwise she has not other pain or symptoms reported at this time.         Review of patient's allergies indicates:   Allergen Reactions    Dilaudid [hydromorphone (bulk)] Other (See Comments)     Oversedating, head burning. Pt prefers to avoid.       Codeine Nausea And Vomiting     Other reaction(s): Itching    Percocet  [oxycodone-acetaminophen]      Other reaction(s): Itching    Sulfa (sulfonamide antibiotics)      Other reaction(s): Nausea  Other reaction(s): Itching     Past Medical History:   Diagnosis Date    Abdominal pain     Allergic rhinitis     Arthritis     Blood transfusion     during delivery and "     Bowel obstruction     Cervical radiculopathy     followed by dr reynolds    Colon cancer     transverse colon; resected; Stage IIA (pT3 pN0 MX)    Diarrhea     Family history of breast cancer     Family history of colon cancer     Fatty liver     GERD (gastroesophageal reflux disease)     History of shingles     Hyperlipidemia     Hypothyroidism     Irritable bowel syndrome     Microscopic colitis     treated     Raynaud phenomenon     Raynaud's disease     Type 2 diabetes mellitus      Past Surgical History:   Procedure Laterality Date    APPENDECTOMY      BACK SURGERY      CARPAL TUNNEL RELEASE      bilateral      SECTION      CHOLECYSTECTOMY  1965    COLECTOMY  2011    Transverse colon resection by Dr. Aguirre    COLONOSCOPY N/A 2017    Procedure: COLONOSCOPY;  Surgeon: Manjit Alvarez MD;  Location: [facility] (4TH FLR);  Service: Endoscopy;  Laterality: N/A;    COLONOSCOPY N/A 2019    Procedure: COLONOSCOPY;  Surgeon: Ramiro Jefferson MD;  Location: [facility] (4TH FLR);  Service: Endoscopy;  Laterality: N/A;  PM prep    ENDOSCOPIC ULTRASOUND OF UPPER GASTROINTESTINAL TRACT N/A 2018    Procedure: ULTRASOUND, UPPER GI TRACT, ENDOSCOPIC;  Surgeon: Jose Hess MD;  Location: [facility];  Service: Endoscopy;  Laterality: N/A;    ENDOSCOPIC ULTRASOUND OF UPPER GASTROINTESTINAL TRACT N/A 2019    Procedure: ULTRASOUND, UPPER GI TRACT, ENDOSCOPIC;  Surgeon: Jose Hess MD;  Location: [facility] (2ND FLR);  Service: Endoscopy;  Laterality: N/A;    ESOPHAGOGASTRODUODENOSCOPY N/A 2018    Procedure: EGD (ESOPHAGOGASTRODUODENOSCOPY);  Surgeon: Angelo Reynolds MD;  Location: [facility] (2ND FLR);  Service: Endoscopy;  Laterality: N/A;    ESOPHAGOGASTRODUODENOSCOPY N/A 2019    Procedure: EGD (ESOPHAGOGASTRODUODENOSCOPY);  Surgeon: Ramiro Jefferson MD;  Location: [facility] (4TH FLR);  Service: Endoscopy;  Laterality: N/A;     EYE SURGERY      Cataract Removal    HYSTERECTOMY      posterolateral fusion with autograft bone and Eun mineralized bone matrix  2/1/13    at [facility] for lumbar spine stenosis    TOE SURGERY      TONSILLECTOMY      TRIGGER FINGER RELEASE       Family History   Problem Relation Age of Onset    Heart disease Father 50        Mi age 50    Colon cancer Father     Bladder Cancer Mother         non smoker    Cataracts Mother     Glaucoma Mother     Heart disease Mother     Hyperlipidemia Mother     Kidney disease Mother     Breast cancer Sister 79    Arthritis Daughter     Asthma Daughter     Depression Daughter     Hypertension Daughter     Stroke Daughter 40    Arthritis Brother     Colon cancer Brother 70    Alcohol abuse Brother     Parkinsonism Brother     Alcohol abuse Brother     Depression Daughter     Celiac disease Neg Hx     Cirrhosis Neg Hx     Colon polyps Neg Hx     Crohn's disease Neg Hx     Cystic fibrosis Neg Hx     Esophageal cancer Neg Hx     Hemochromatosis Neg Hx     Inflammatory bowel disease Neg Hx     Irritable bowel syndrome Neg Hx     Liver cancer Neg Hx     Liver disease Neg Hx     Rectal cancer Neg Hx     Stomach cancer Neg Hx     Ulcerative colitis Neg Hx     Eliazar's disease Neg Hx     Amblyopia Neg Hx     Blindness Neg Hx     Macular degeneration Neg Hx     Retinal detachment Neg Hx     Strabismus Neg Hx     Melanoma Neg Hx      Social History     Tobacco Use    Smoking status: Never Smoker    Smokeless tobacco: Never Used   Substance Use Topics    Alcohol use: Yes     Comment: socially, hardly ever    Drug use: No     Review of Systems   Constitutional: Negative for activity change, appetite change, chills, diaphoresis, fatigue and fever.   HENT: Negative for congestion, sinus pressure and sinus pain.    Eyes: Positive for visual disturbance. Negative for photophobia.   Respiratory: Negative for apnea, choking, chest tightness and  shortness of breath.    Cardiovascular: Negative for chest pain, palpitations and leg swelling.   Gastrointestinal: Negative for abdominal distention, abdominal pain, constipation, diarrhea, nausea and vomiting.   Endocrine: Negative for polyphagia and polyuria.   Genitourinary: Negative for difficulty urinating and frequency.   Musculoskeletal: Positive for back pain. Negative for arthralgias, neck pain and neck stiffness.   Skin: Positive for wound.   Neurological: Positive for headaches. Negative for dizziness, seizures, syncope, facial asymmetry, light-headedness and numbness.   Hematological: Negative for adenopathy. Does not bruise/bleed easily.   Psychiatric/Behavioral: Negative for agitation and behavioral problems.       Physical Exam     Initial Vitals [01/17/20 0948]   BP Pulse Resp Temp SpO2   120/67 62 14 97.7 °F (36.5 °C) 99 %      MAP       --         Physical Exam    Nursing note and vitals reviewed.  Constitutional: She appears well-developed and well-nourished. She is not diaphoretic. No distress.   HENT:   Head: Normocephalic and atraumatic.   Mouth/Throat: No oropharyngeal exudate.   Eyes: EOM are normal. Pupils are equal, round, and reactive to light. Right eye exhibits no discharge. Left eye exhibits no discharge. No scleral icterus.   Neck: Normal range of motion. Neck supple.   Cardiovascular: Normal rate, regular rhythm and normal heart sounds.   No murmur heard.  Pulmonary/Chest: Breath sounds normal. No respiratory distress. She has no wheezes.   Abdominal: Soft. Bowel sounds are normal. She exhibits no distension. There is no tenderness. There is no rebound and no guarding.   Musculoskeletal: Normal range of motion. She exhibits no edema or tenderness.   Neurological: She is alert and oriented to person, place, and time. No cranial nerve deficit.   Skin: Skin is warm and dry. No erythema. No pallor.   3 cm laceration noted over the occipital head. Vertical          ED Course    Procedures  Labs Reviewed   CBC W/ AUTO DIFFERENTIAL - Abnormal; Notable for the following components:       Result Value    RBC 3.93 (*)     Hemoglobin 11.1 (*)     Hematocrit 35.5 (*)     Mean Corpuscular Hemoglobin Conc 31.3 (*)     RDW 20.7 (*)     Platelets 79 (*)     All other components within normal limits   COMPREHENSIVE METABOLIC PANEL - Abnormal; Notable for the following components:    Glucose 215 (*)     Alkaline Phosphatase 46 (*)     All other components within normal limits   LACTIC ACID, PLASMA - Abnormal; Notable for the following components:    Lactate (Lactic Acid) 2.7 (*)     All other components within normal limits   HEMOGLOBIN A1C - Abnormal; Notable for the following components:    Hemoglobin A1C 7.3 (*)     Estimated Avg Glucose 163 (*)     All other components within normal limits    Narrative:     Add on [facility] order # [id number]  per Festus Morel MD @  15:44  01/17/2020    POCT GLUCOSE - Abnormal; Notable for the following components:    POCT Glucose 226 (*)     All other components within normal limits   LIPASE   URINALYSIS, REFLEX TO URINE CULTURE    Narrative:     Preferred Collection Type->Urine, Clean Catch   HEMOGLOBIN A1C          Imaging Results          CT Abdomen Pelvis With Contrast (Final result)  Result time 01/17/20 15:38:08    Final result by Teto Cronin MD (01/17/20 15:38:08)                 Impression:      Wall thickening and inflammatory changes involving the cecum and right colon concerning for an underlying infectious or inflammatory colitis.    Prior multiple small large bowel resections.  No bowel obstruction.    Stable grade 2 anterolisthesis of L5 on S1.    Electronically signed by resident: Liberty Madsen  Date:    01/17/2020  Time:    14:48    Electronically signed by: Teto Cronin MD  Date:    01/17/2020  Time:    15:38             Narrative:    EXAMINATION:  CT ABDOMEN PELVIS WITH CONTRAST    CLINICAL HISTORY:  Abdominal  distension    TECHNIQUE:  Axial CT images of the abdomen and pelvis obtained after the administration of intravenous contrast 75 cc of Omnipaque 350.  Coronal sagittal reformats are provided.  No oral contrast was administered.    COMPARISON:  CT abdomen and pelvis 06/20/2019.    FINDINGS:  Lung bases show no significant abnormalities.  No pleural effusion.  Heart is not enlarged.  No pericardial effusion.    Liver is normal in size and is diffusely hypoattenuating in comparison to the spleen suggestive of hepatic steatosis.  No focal hepatic abnormality is seen.  No intra or extrahepatic biliary ductal dilatation.  Gallbladder is surgically removed.  Portal venous system and branches are patent.  Spleen is enlarged.    Stomach, pancreas, and adrenal glands appear within normal limits.    Kidneys are normal in size and location.  Bilateral extrarenal pelvis.  No renal mass or [id number].  Multiple small right renal hypodensities, too small to characterize.  Likely cyst.  Visualized ureters are normal in course to the urinary bladder.  Urinary bladder is distended.  Uterus is surgically removed it has.  Pessary device present.    No bowel obstruction.  Postoperative changes of prior small and large bowel resections.  There is wall thickening and pericolonic inflammatory changes involving the cecum and right colon concerning for infectious or inflammatory colitis.  Colonic diverticulosis noted notably in the sigmoid.    No lymphadenopathy in the abdomen and pelvis.  No free intraperitoneal air or ascites.  Abdominal aorta is normal in course and caliber with moderate atherosclerotic calcifications.  Osseous structures show grade 2 anterolisthesis of L5 on S1 with bilateral L5 spondylolysis.  Grade 1 anterolisthesis of L4 on L5.  Appearance is similar to prior examination.  Age-appropriate degenerative changes elsewhere.    Prior midline laparotomy scar.  Thickening along the anterior abdominal wall presumably  postoperative in nature.                               CT Head Without Contrast (Final result)  Result time 01/17/20 13:29:16    Final result by Primo Liao MD (01/17/20 13:29:16)                 Impression:      Mild extracranial soft tissue swelling without evidence underlying fracture or acute intracranial hemorrhage.    Electronically signed by resident: Jordan Samano  Date:    01/17/2020  Time:    13:16    Electronically signed by: Primo Liao MD  Date:    01/17/2020  Time:    13:29             Narrative:    EXAMINATION:  CT HEAD WITHOUT CONTRAST    CLINICAL HISTORY:  Head trauma, minor, GCS>=13, NOC/NEXUS/CCR neg, first study;    TECHNIQUE:  Low dose axial CT images obtained throughout the head without intravenous contrast. Sagittal and coronal reconstructions were performed.    COMPARISON:  MRI brain: 03/24/2015.    FINDINGS:  INTRACRANIAL COMPARTMENT:    Mild generalized cerebral volume loss.  No hydrocephalus.    Brain appears stable allowing for variation in modality.  No new parenchymal mass, hemorrhage, edema or major vascular distribution infarct.    No new extra-axial blood or fluid collections.    Scattered atherosclerotic calcification about the skull base.    SKULL/EXTRACRANIAL CONTENTS:    Mild extracranial soft tissue swelling posteriorly.  No calvarial fracture.  Mastoid air cells and partially visualized paranasal sinuses are essentially clear.                               X-Ray Hips Bilateral 2 View Incl AP Pelvis (Final result)  Result time 01/17/20 11:07:06    Final result by Timoteo Newman MD (01/17/20 11:07:06)                 Impression:      See above      Electronically signed by: Timoteo Newman MD  Date:    01/17/2020  Time:    11:07             Narrative:    EXAMINATION:  XR HIPS BILATERAL 2 VIEW INCL AP PELVIS    CLINICAL HISTORY:  Unspecified fall, initial encounter    TECHNIQUE:  AP view of the pelvis and frogleg lateral views of both hips were  performed.    COMPARISON:  None.    FINDINGS:  Mild DJD.  No fracture or dislocation.  No bone destruction identified                               X-Ray Chest PA And Lateral (Final result)  Result time 01/17/20 11:09:47    Final result by Jerson Echeverria MD (01/17/20 11:09:47)                 Impression:      No acute cardiopulmonary disease and no significant interval change      Electronically signed by: Jerson Echeverria MD  Date:    01/17/2020  Time:    11:09             Narrative:    EXAMINATION:  XR CHEST PA AND LATERAL    CLINICAL HISTORY:  Unspecified fall, initial encounter    TECHNIQUE:  PA and lateral views of the chest were performed.    COMPARISON:  11/15/2018    FINDINGS:  Heart size and pulmonary vascularity are within normal limits.  No hilar or mediastinal enlargement. Lungs are satisfactorily expanded and appear free of active disease. No pleural fluid or pneumothorax.  Mild hypertrophic degenerative changes involving the thoracic spine.  Postoperative changes again seen at the lower cervical spine.                                 Medical Decision Making:   History:   Old Medical Records: I decided to obtain old medical records.  Initial Assessment:   Patient is a 74 yo F presenting after a mechanical fall with HA and blurry vision afterwards but absent of FND.  Differential Diagnosis:   Head Laceration   SDH  Epidural Hematoma   SAH  Cranial Fx  Clinical Tests:   Lab Tests: Ordered  Radiological Study: Ordered  ED Management:  11:03 AM  Patient initially presented for evaluation after fall. Without FND and extremity numbness/tingling. 3cm laceration noted on physical exam. HD stable denied any symptoms prior to fall (CP, SOB, dizziness....) and suspect this was strictly a mechanical fall. Because of age of patient we will order a CT head wo contrast to rule out brain bleed. Patient will also get XR hip with history of trauma to evaluate for fracture. Will not get a spine XR as she is not having spine pain  "and no point tenderness along spine on physical exam.   On reevaluation of patient, she was complaining of intermittent abd pain (not currently). Patient with normal BMs and flatus. Complaining of distension but not appreciated on exam. Patient is requesting a CT of her abdomen as she is worried with her history of colon CA and surgeries. Described risks of excess radiation to patient and that clinically she does not need the CT but patient is adament and sgress to take risks incurred with contrast and radiation required.     12:29 PM  XR chest and hips B/L without Fx or acute changes     2:51 PM  CT head without acute changes. No sign of intracranial bleed.   Head laceration was closed with 6 staple. 7 ml 1% Lidocaine used to numb the surrounding tissue. Awaiting CT abd/pelvis.    3:52 PM  CT with "Wall thickening and inflammatory changes involving the cecum and right colon concerning for an underlying infectious or inflammatory colitis." patient is not obstructed without emesis in the ED and is passing flatus and having BMs. Will discharge patient home with GI follow up for evaluation of bowel inflammation.               Attending Attestation:   Physician Attestation Statement for Resident:  As the supervising MD   Physician Attestation Statement: I have personally seen and examined this patient.   I agree with the above history. -:   As the supervising MD I agree with the above PE.    As the supervising MD I agree with the above treatment, course, plan, and disposition.            Attending ED Notes:   STAFF ATTENDING PHYSICIAN NOTE:  I have individually/jointly evaluated Anayeli Judd and discussed their ED management with the resident physician. I have also reviewed their notes, assessments, and procedures documented.  I was present during all critical portions of any procedure(s) performed on Anayeli Judd, who presented status post mechanical trip and fall with closed head injury and laceration " warranting lack repair.  She additionally has extensive history of small-bowel obstructions, and reported abdominal pain and constipation leading for CT imaging, which did not reveal any acute pathology warranting surgical evaluation.  ____________________  Jerzy Morel MD, [facility]  Emergency Medicine Staff  10:15 AM 1/19/2020                          Clinical Impression:       ICD-10-CM ICD-9-CM   1. Laceration of head without foreign body, unspecified part of head, initial encounter S01.91XA 873.8   2. Fall W19.XXXA E888.9         Disposition:   Disposition: Discharged  Condition: Stable                     Ray Liang MD  Resident  01/17/20 1601       Festus Morel MD  01/19/20 1016

## 2020-01-17 NOTE — DISCHARGE INSTRUCTIONS
Please call and schedule an appointment with GI doctor. Please follow up with your PCP in 7-10 days. Please return to ED if laceration begins to profusely bleed or if headache acutely worsens.

## 2020-01-17 NOTE — ED TRIAGE NOTES
"Anayeli Judd, a 75 y.o. female presents to the ED w/ complaint of mechanical fall this morning and hit her back/head with laceration to the posterior side of the head but not on blood thinners.  Pt denies LOC or n/v.  Pt states that she feels "weird".  Pt denies CP, SOB, dysuria, but states that she has "blockages" in the intestines and has been treated for colon cancer.      Triage note:  Chief Complaint   Patient presents with    Fall     States attempting to sit down and chair moved from under her. Reports laceration to back of head. NO LOC Denies Blood thinners     Review of patient's allergies indicates:   Allergen Reactions    Dilaudid [hydromorphone (bulk)] Other (See Comments)     Oversedating, head burning. Pt prefers to avoid.       Codeine Nausea And Vomiting     Other reaction(s): Itching    Percocet  [oxycodone-acetaminophen]      Other reaction(s): Itching    Sulfa (sulfonamide antibiotics)      Other reaction(s): Nausea  Other reaction(s): Itching     Past Medical History:   Diagnosis Date    Abdominal pain     Allergic rhinitis     Arthritis     Blood transfusion     during delivery and     Bowel obstruction     Cervical radiculopathy     followed by dr cloud    Colon cancer     transverse colon; resected; Stage IIA (pT3 pN0 MX)    Diarrhea     Family history of breast cancer     Family history of colon cancer     Fatty liver     GERD (gastroesophageal reflux disease)     History of shingles     Hyperlipidemia     Hypothyroidism     Irritable bowel syndrome     Microscopic colitis     treated     Raynaud phenomenon     Raynaud's disease     Type 2 diabetes mellitus        "

## 2020-01-21 NOTE — TELEPHONE ENCOUNTER
Spoke to pt and asked her if she needs a f/u appt with Dr. Betts, pt said yes. Informed pt that Dr. Betts first available is not until 02/12/2020, pt asked if he had anything sooner, informed pt that he doesn't but she can be added to the waiting list and if someone cancels then she may be seen sooner. Pt expressed understanding and scheduled appt on 02/12/2020 at 4PM. Pt was also added to the waiting list.

## 2020-01-27 ENCOUNTER — TELEPHONE (OUTPATIENT)
Dept: GASTROENTEROLOGY | Facility: HOSPITAL | Age: 75
End: 2020-01-27

## 2020-01-27 ENCOUNTER — OFFICE VISIT (OUTPATIENT)
Dept: UROGYNECOLOGY | Facility: CLINIC | Age: 75
End: 2020-01-27
Payer: MEDICARE

## 2020-01-27 VITALS
SYSTOLIC BLOOD PRESSURE: 110 MMHG | DIASTOLIC BLOOD PRESSURE: 60 MMHG | HEIGHT: 62 IN | BODY MASS INDEX: 26.69 KG/M2 | WEIGHT: 145.06 LBS

## 2020-01-27 DIAGNOSIS — N39.46 MIXED STRESS AND URGE URINARY INCONTINENCE: Primary | ICD-10-CM

## 2020-01-27 DIAGNOSIS — Z46.89 PESSARY MAINTENANCE: ICD-10-CM

## 2020-01-27 DIAGNOSIS — N39.0 RECURRENT UTI: ICD-10-CM

## 2020-01-27 DIAGNOSIS — N81.11 MIDLINE CYSTOCELE: ICD-10-CM

## 2020-01-27 DIAGNOSIS — N95.2 VAGINAL ATROPHY: ICD-10-CM

## 2020-01-27 DIAGNOSIS — S01.01XD LACERATION OF SCALP WITHOUT FOREIGN BODY, SUBSEQUENT ENCOUNTER: ICD-10-CM

## 2020-01-27 DIAGNOSIS — K52.9 COLITIS: Primary | ICD-10-CM

## 2020-01-27 DIAGNOSIS — N81.6 RECTOCELE: ICD-10-CM

## 2020-01-27 PROCEDURE — 1126F AMNT PAIN NOTED NONE PRSNT: CPT | Mod: ,,, | Performed by: NURSE PRACTITIONER

## 2020-01-27 PROCEDURE — 99213 OFFICE O/P EST LOW 20 MIN: CPT | Mod: S$PBB,,, | Performed by: NURSE PRACTITIONER

## 2020-01-27 PROCEDURE — 99215 OFFICE O/P EST HI 40 MIN: CPT | Mod: PBBFAC | Performed by: NURSE PRACTITIONER

## 2020-01-27 PROCEDURE — 99999 PR PBB SHADOW E&M-EST. PATIENT-LVL V: ICD-10-PCS | Mod: PBBFAC,,, | Performed by: NURSE PRACTITIONER

## 2020-01-27 PROCEDURE — 1126F PR PAIN SEVERITY QUANTIFIED, NO PAIN PRESENT: ICD-10-PCS | Mod: ,,, | Performed by: NURSE PRACTITIONER

## 2020-01-27 PROCEDURE — 99213 PR OFFICE/OUTPT VISIT, EST, LEVL III, 20-29 MIN: ICD-10-PCS | Mod: S$PBB,,, | Performed by: NURSE PRACTITIONER

## 2020-01-27 PROCEDURE — 1159F PR MEDICATION LIST DOCUMENTED IN MEDICAL RECORD: ICD-10-PCS | Mod: ,,, | Performed by: NURSE PRACTITIONER

## 2020-01-27 PROCEDURE — 1159F MED LIST DOCD IN RCRD: CPT | Mod: ,,, | Performed by: NURSE PRACTITIONER

## 2020-01-27 PROCEDURE — 99999 PR PBB SHADOW E&M-EST. PATIENT-LVL V: CPT | Mod: PBBFAC,,, | Performed by: NURSE PRACTITIONER

## 2020-01-27 NOTE — PROGRESS NOTES
Urogyn follow up  01/27/2020  .  [facility]   [address] [address]    June Hardik Judd  [id number]  1945      Anayeli Judd is a 75 y.o.  here for a urogyn follow up for recurrent uti and vaginal abrasion.    Last HPI from 10/28/2019  1)  Mixed urinary incontinence, urge > stress:    --initially improved with pessary.    --discharge has resolved.     2)  Vaginal atrophy (dryness):    --using estrogen cream  --using osphena      3)  Incomplete bladder emptying:  --can't tell if pessary helping; does have moderate 2nd void volumes by straining.       4)  Frequent UTIs (bladder infections):  --control diabetes  --work on improving bladder emptying  --has not been taking 250 mg maintenance dose     5)  Constipation intermittent with loose stool:  --better with 4-5 tsps/day  --not needing stool softener  --continue use miralax daily--see if taking 1/2 dose is sufficient          Changes from last visit:  1)  Mixed urinary incontinence, urge > stress:    --initially improved with pessary.    --discharge has resolved.     2)  Vaginal atrophy (dryness):    --using estrogen cream  --using osphena      3)  Incomplete bladder emptying:  --can't tell if pessary helping; does have moderate 2nd void volumes by straining.       4)  Frequent UTIs (bladder infections):  --control diabetes  --work on improving bladder emptying  --has not been taking 250 mg maintenance dose     5)  Constipation intermittent with loose stool:  --better with 4-5 tsps/day  --not needing stool softener  --continue use miralax daily--see if taking 1/2 dose is sufficient    --Had fall requiring staples in scalp-- they are ready for removal        11/26/2018  Cysto with Togami  FINDINGS:  Dome, anterior, posterior, lateral walls and bladder base free of urothelial abnormalities. Right and left ureteral orifices in the normal postion and configuration, both effluxed clear urine.  Bladder neck and  urethra were normal    11/15/2018  Retroperitoneal ultrasound  Right kidney: Normal in size measuring 10 cm. Increased cortical echogenicity.  No loss of corticomedullary distinction. Resistive index measures 0.70.  No mass. No renal stone. Mild hydronephrosis.  Left kidney: Normal in size measuring 10.1 cm. Increased cortical echogenicity.  No loss of corticomedullary distinction. Resistive index measures 0.74.  No mass. No renal stone. Mildly prominent collecting system, possibly representing mild hydronephrosis.  Urinary bladder is distended but otherwise unremarkable.  Both ureteral jets are seen.  Impression  Mild bilateral hydronephrosis, right side greater than left, similar to recent CT.  Distended urinary bladder with preservation of bilateral ureteral jets.    Past Medical History:   Diagnosis Date    Abdominal pain     Allergic rhinitis     Arthritis     Blood transfusion     during delivery and     Bowel obstruction     Cervical radiculopathy     followed by dr cloud    Colon cancer     transverse colon; resected; Stage IIA (pT3 pN0 MX)    Diarrhea     Family history of breast cancer     Family history of colon cancer     Fatty liver     GERD (gastroesophageal reflux disease)     History of shingles     Hyperlipidemia     Hypothyroidism     Irritable bowel syndrome     Microscopic colitis     treated     Raynaud phenomenon     Raynaud's disease     Type 2 diabetes mellitus        Past Surgical History:   Procedure Laterality Date    APPENDECTOMY      BACK SURGERY      CARPAL TUNNEL RELEASE      bilateral      SECTION      CHOLECYSTECTOMY  1965    COLECTOMY  2011    Transverse colon resection by Dr. Aguirre    COLONOSCOPY N/A 2017    Procedure: COLONOSCOPY;  Surgeon: Manjit Alvarez MD;  Location: [facility] ([address] [place] [address]);  Service: Endoscopy;  Laterality: N/A;    COLONOSCOPY N/A 2019    Procedure: COLONOSCOPY;  Surgeon: Ramiro Jefferson MD;   Location: [facility] (4TH FLR);  Service: Endoscopy;  Laterality: N/A;  PM prep    ENDOSCOPIC ULTRASOUND OF UPPER GASTROINTESTINAL TRACT N/A 12/12/2018    Procedure: ULTRASOUND, UPPER GI TRACT, ENDOSCOPIC;  Surgeon: Jose Hess MD;  Location: [facility] END[facility];  Service: Endoscopy;  Laterality: N/A;    ENDOSCOPIC ULTRASOUND OF UPPER GASTROINTESTINAL TRACT N/A 1/23/2019    Procedure: ULTRASOUND, UPPER GI TRACT, ENDOSCOPIC;  Surgeon: Jose Hess MD;  Location: [facility] (2ND FLR);  Service: Endoscopy;  Laterality: N/A;    ESOPHAGOGASTRODUODENOSCOPY N/A 11/16/2018    Procedure: EGD (ESOPHAGOGASTRODUODENOSCOPY);  Surgeon: Angelo Reynolds MD;  Location: [facility] (2ND FLR);  Service: Endoscopy;  Laterality: N/A;    ESOPHAGOGASTRODUODENOSCOPY N/A 6/26/2019    Procedure: EGD (ESOPHAGOGASTRODUODENOSCOPY);  Surgeon: Ramiro Jefferson MD;  Location: [facility] (4TH FLR);  Service: Endoscopy;  Laterality: N/A;    EYE SURGERY      Cataract Removal    HYSTERECTOMY      posterolateral fusion with autograft bone and Eun mineralized bone matrix  2/1/13    at [facility] for lumbar spine stenosis    TOE SURGERY      TONSILLECTOMY      TRIGGER FINGER RELEASE         Current Outpatient Medications   Medication Sig    acetaZOLAMIDE (DIAMOX) 250 MG tablet Take 1 tablet (250 mg total) by mouth 3 (three) times daily.    atorvastatin (LIPITOR) 40 MG tablet TAKE 1 TABLET(40 MG) BY MOUTH EVERY DAY    betamethasone dipropionate (DIPROLENE) 0.05 % ointment Apply every Am x 2 weeks then twice weekly    blood sugar diagnostic Strp Dispense madie contour strips; test blood sugar once daily    blood-glucose meter (CONTOUR METER) kit Please dispense Madie Contour meter and supplies Use as instructed Test Blood sugar once daily    cephALEXin (KEFLEX) 250 MG capsule     diphenhydrAMINE (BENADRYL) 25 mg capsule Take 1 each (25 mg total) by mouth nightly as needed for Itching, Allergies or Insomnia.    docusate sodium (COLACE) 100  MG capsule Take 1 capsule (100 mg total) by mouth 2 (two) times daily.    DULoxetine (CYMBALTA) 30 MG capsule     fluticasone (FLONASE) 50 mcg/actuation nasal spray 2 sprays by Each Nare route daily as needed.     FREESTYLE EFREN 10 DAY SENSOR Kit Check blood glucose every day. Please dispense 6 mos supply.    FREESTYLE EFREN READER Misc TEST as directed    gabapentin (NEURONTIN) 600 MG tablet Take 1,200 mg by mouth 2 (two) times daily.     hydrocortisone 2.5 % cream     Lactobacillus rhamnosus GG (CULTURELLE) 10 billion cell capsule Take 1 capsule by mouth once daily.    lancets (ONE TOUCH ULTRASOFT LANCETS) Misc Test blood sugar once daily    levothyroxine (SYNTHROID) 100 MCG tablet TAKE 1 TABLET BY MOUTH EVERY MORNING    lidocaine (LIDODERM) 5 %     linaGLIPtin (TRADJENTA) 5 mg Tab tablet Take 1 tablet (5 mg total) by mouth once daily.    magnesium 30 mg Tab Take 500 capsules by mouth. Patient's taking magnesium 500mg QD    metFORMIN (GLUCOPHAGE-XR) 750 MG 24 hr tablet TAKE 1 TABLET(750 MG) BY MOUTH TWICE DAILY WITH MEALS    methocarbamol (ROBAXIN) 500 MG Tab Take 500 mg by mouth every evening.    mirabegron (MYRBETRIQ) 50 mg Tb24 Take 1 tablet (50 mg total) by mouth once daily.    mupirocin (BACTROBAN) 2 % ointment Apply topically 3 (three) times daily.    nitrofurantoin, macrocrystal-monohydrate, (MACROBID) 100 MG capsule Take 1 capsule (100 mg total) by mouth 2 (two) times daily.    ondansetron (ZOFRAN) 4 MG tablet Take 1 tablet (4 mg total) by mouth every 8 (eight) hours as needed for Nausea.    ondansetron (ZOFRAN-ODT) 8 MG TbDL     ospemifene (OSPHENA) 60 mg Tab Take 60 mg by mouth once daily.    pantoprazole (PROTONIX) 40 MG tablet Take 1 tablet (40 mg total) by mouth once daily.    polyethylene glycol (GLYCOLAX) 17 gram/dose powder Take 17 g by mouth once daily.    PREMARIN vaginal cream PLACE 0.5 GRAM VAGINALLY EVERY EVENING    traMADol (ULTRAM) 50 mg tablet     triamcinolone  "acetonide 0.1% (KENALOG) 0.1 % paste apply to affected area with A Q-TIP three times a day    vitamin D 1000 units Tab Take 185 mg by mouth once daily. D3    azelastine (ASTELIN) 137 mcg nasal spray 1 spray (137 mcg total) by Nasal route 2 (two) times daily.    cholestyramine-aspartame (QUESTRAN/PREVALITE LIGHT) 4 gram Powd Take 4 g by mouth once daily.     No current facility-administered medications for this visit.        Well woman:  Pap test: post ANGEL.  History of abnormal paps: Yes--had ANGEL.  History of STIs:  No  Mammogram: Date of last: 05/2019 normal  Colonoscopy: Date of last: 2017.  Result:  Benign polyps.  Repeat due:  2019.    DEXA:  Date of last: 05/17/2019 normal     ROS:  As per HPI.      Exam  /60 (BP Location: Left arm, Patient Position: Sitting, BP Method: Medium (Manual))   Ht 5' 2" (1.575 m)   Wt 65.8 kg (145 lb 1 oz)   LMP  (LMP Unknown)   BMI 26.53 kg/m²   General: alert and oriented, no acute distress  Scalp: incision in occipital area well healed-- 6 staples intact.   Respiratory: normal respiratory effort  Abd: soft, non-tender, non-distended    Pelvic  Ext. Genitalia:external genitalia no lesions. Atrophic. Normal bartholin's and skeens glands  Vagina: + atrophy.  0.5 cm ulcerated area noted on R vaginal wall near 9 o'clock position. No discharge noted. #2 ring with support pessary in place.   Cervix: absent  Uterus:  absent   Urethra: no masses or tenderness  Urethral meatus: no lesions, caruncle or prolapse.    Pessary removed without difficulty. Washed with soap and water. Given to patient for pessary holiday.    Impression  No diagnosis found.  We reviewed the above issues and discussed options for short-term versus long-term management of her problems.   Plan:     1)  Mixed urinary incontinence, urge > stress:    --control diabetes  --Empty bladder every 3 hours.  Empty well: wait a minute, lean forward on toilet.    --Avoid dietary irritants (see sheet).  Keep diary x 3-5 " days to determine your irritants.  --consider PT in future   --URGE: consider medication (Mirabegron due to SBO).  Takes 2-4 weeks to see if will have effect.  For dry mouth: get sour, sugar free lozenge or gum.    --STRESS:  Pessary vs. Sling.   --#2 ring with support pessary    2)  Vaginal atrophy (dryness):  Use 1 gm vaginal estrogen cream nightly x 1 week.    --continue osphena  --may also reduce bladder infection rate     3)  Vulvar irritation:  --use betamethasone Am  twice weekly  --use estrogen cream  Pm  twice weekly  --use moisture barrier ointment    4)  Incomplete bladder emptying:  --resolved with pessary     5)  Frequent UTIs (bladder infections):  --control diabetes  --work on improving bladder emptying  --continue Keflex 250 mg daily  --If you feel like you have a UTI, please call our office so that we can place an order for you to drop off a urine specimen at the closest [facility] lab (we will arrange).                --We will call in antibiotics for you to start right after you drop off specimen.                --In this way, we can determine:                           1)  Do you have a UTI?                            2) If you have a UTI, is it sensitive to the antibiotics we prescribed?   --follow UTI prevention tips (see attached)  --control bowel movements/fecal cross-contamination  --treat vaginal atrophy (dryness)  --empty bladder before and after intercourse  --consider need for further evaluation (pelvic imaging and cystoscopy) if issue persists; 618 CT A/P  normal     6)  Constipation intermittent with loose stool:  Controlling constipation may help bladder urgency/leakage and fiber may better control cholesterol and blood glucose.  Increas daily fiber.  Increase fiber by 1 tablet every 3-5 days until stool is easy to pass. Take fiber tablets with large glass of water.  When find place where stool is easy to pass and more well-formed, less loose/accidents,  stop and continue at that dose.    Do not exceed 6 tablets/day.    --May also use over the counter stool softener 1-2 x/day.  --continue use miralax daily--see if taking 1/2 dose is sufficient     7)  RTC 3 months.       30 minutes were spent in face to face time with this patient  90 % of this time was spent in counseling and/or coordination of care    KENNEDY Petit-BC [facility]  Division of Female Pelvic Medicine and Reconstructive Surgery  Department of Obstetrics & Gynecology

## 2020-01-27 NOTE — TELEPHONE ENCOUNTER
Message received from patient's PCP regarding recent hospitalization due to fall and laceration. Had CT scan with concern for right sided colitis.    She reports she asked for CT scan of her abdomen due to concern for abdominal pain (intermittent) in different locations and since was in hospital asked if it could be done. [place] pain was more shooting in intensity and occasionally higher up near rib cage. No pain currently. No n/v. Bowel habits at baseline, with fluctuations between diarrhea and constipation. +ve bloating. No bleeding.    Offered stool studies. Is agreeable.  Discussed colonoscopy, had prior negative Colon (6/2019), offered colonoscopy, she declined and will discuiss further in upcomming appointment.  Has appt pending 2/2019.

## 2020-01-27 NOTE — PROCEDURES
Procedures     Occipital scalp staples removed without difficulty. Incision well approximated without redness, swelling, or drainage. Almost completely epithelialized. Blanka Blair, MAGNOLIAP-BC

## 2020-02-12 ENCOUNTER — OFFICE VISIT (OUTPATIENT)
Dept: GASTROENTEROLOGY | Facility: CLINIC | Age: 75
End: 2020-02-12
Payer: MEDICARE

## 2020-02-12 DIAGNOSIS — D50.9 IRON DEFICIENCY ANEMIA, UNSPECIFIED IRON DEFICIENCY ANEMIA TYPE: ICD-10-CM

## 2020-02-12 DIAGNOSIS — R10.9 ABDOMINAL PAIN, UNSPECIFIED ABDOMINAL LOCATION: Primary | ICD-10-CM

## 2020-02-12 PROCEDURE — 99213 OFFICE O/P EST LOW 20 MIN: CPT | Mod: PBBFAC | Performed by: INTERNAL MEDICINE

## 2020-02-12 PROCEDURE — 99999 PR PBB SHADOW E&M-EST. PATIENT-LVL III: CPT | Mod: PBBFAC,GC,, | Performed by: INTERNAL MEDICINE

## 2020-02-12 PROCEDURE — 99213 PR OFFICE/OUTPT VISIT, EST, LEVL III, 20-29 MIN: ICD-10-PCS | Mod: S$PBB,GC,, | Performed by: INTERNAL MEDICINE

## 2020-02-12 PROCEDURE — 99999 PR PBB SHADOW E&M-EST. PATIENT-LVL III: ICD-10-PCS | Mod: PBBFAC,GC,, | Performed by: INTERNAL MEDICINE

## 2020-02-12 PROCEDURE — 99213 OFFICE O/P EST LOW 20 MIN: CPT | Mod: S$PBB,GC,, | Performed by: INTERNAL MEDICINE

## 2020-02-12 NOTE — Clinical Note
Hi, looks like she's doing much better from GI standpoint. Not going to make any changes for now. We aren't recommending more evaluation for her iron def anemia for now but would recommend sending her for IV therapy as likely the PO will just give her GI symptoms.Thanks,Dan

## 2020-02-12 NOTE — PROGRESS NOTES
[facility]    Reason for visit: There were no encounter diagnoses.  Referring Provider/PCP: Rocio Mc MD    History of Present Illness:  Anayeli Judd is a 75 y.o. female with a history of CRC, multiple admissions for SBO, IBS, microscopic colitis, HLD, T2DM, GERD, Hypothyroidism, who is presenting for follow-up evaluation of abdominal pain.    Was recently seen in the ED (1/17/20) due to mechanical fall while getting manicure. While in the ED requested a CTAP which demonstrated wall thickening and inflammatory changes of the cecum and right colon for which she was prescribed course of antibiotics. Unclear if she was having increased abdominal symptoms at that time. Per review of ED notes she was not having increased symptoms beyond baseline (reflected in my initial discussion with her on the phone) but in clinic she does endorse at that time increased abdominal pain and brief diarrhea.    She notes she is feeling very well currently but is worried she is 'getting fat'. She attributes feeling well due to liberalization of her diet. She feels this has allowed her bowel movements to be more formed (denies any recent diarrhea). Endorses significant appetite and recent weight gain. Denies abdominal pain currently but reports she is having daily, intermittent, brief (< 1 s) stabbing sensation pain which will occur in random locations. Pain is extremely brief and does not prevent from activities during the day. Does not take anything for the pain. As above no diarrhea, no overt signs of bleeding. +ve urinary incontinence, no fecal incontinence.  Denies reflux (well controlled with PPI). Occasional difficulty with pills but no other dysphagia symptoms.    Recently started on iron due to iron deficiency, but stopped taking recently (no clear reason).    PEndoHx:  EUS. (1/23/19) Provider: Dr. Hess. Indication: CT Abnormality.   - heterogeneous appearance of liver  - normal appearance  of the pancreas  - minor superficial mucosal tear at the cricopharyngeus  - non-bleeding gastric ulcer     EGD. (18). Dr. Reynolds. Indication:Abdominal pain.  - normal study  - duodenal and gastric biopsies negative. Negative HPylori.     Colonoscopy. (17) Provider: Dr. Alvarez. Indication: Diarrhea. Extent: Cecum. Preparation:Adequate.  - 5mm transverse polyp  - 2x 3mm transverse polyp [tubular adenoma]  - random colonic biopsies [negative for microscopic colitis    Review of Systems:  Constitutional: no fever, chills or change in weight (weight gain)  Eyes: no visual changes   ENT: no sore throat or dysphagia  Respiratory: no cough or shortness of breath   Cardiovascular: no chest pain or palpitations   Gastrointestinal: as per HPI  Hematologic/Lymphatic: no easy bruising or lymphadenopathy   Musculoskeletal: no arthralgias or myalgias   Neurological: no change in mental status  Behavioral/Psych: no change in mood    Medical History:  Past Medical History:   Diagnosis Date    Abdominal pain     Allergic rhinitis     Arthritis     Blood transfusion     during delivery and     Bowel obstruction     Cervical radiculopathy     followed by dr reynolds    Colon cancer     transverse colon; resected; Stage IIA (pT3 pN0 MX)    Diarrhea     Family history of breast cancer     Family history of colon cancer     Fatty liver     GERD (gastroesophageal reflux disease)     History of shingles     Hyperlipidemia     Hypothyroidism     Irritable bowel syndrome     Microscopic colitis     treated     Raynaud phenomenon     Raynaud's disease     Type 2 diabetes mellitus        Past Surgical History:   Procedure Laterality Date    APPENDECTOMY      BACK SURGERY      CARPAL TUNNEL RELEASE      bilateral      SECTION      CHOLECYSTECTOMY  1965    COLECTOMY  2011    Transverse colon resection by Dr. Aguirre    COLONOSCOPY N/A 2017    Procedure: COLONOSCOPY;  Surgeon:  Manjit Alvarez MD;  Location: [facility] (4TH FLR);  Service: Endoscopy;  Laterality: N/A;    COLONOSCOPY N/A 6/26/2019    Procedure: COLONOSCOPY;  Surgeon: Ramiro Jefferson MD;  Location: [facility] (4TH FLR);  Service: Endoscopy;  Laterality: N/A;  PM prep    ENDOSCOPIC ULTRASOUND OF UPPER GASTROINTESTINAL TRACT N/A 12/12/2018    Procedure: ULTRASOUND, UPPER GI TRACT, ENDOSCOPIC;  Surgeon: Jose Hess MD;  Location: [facility] ENDO;  Service: Endoscopy;  Laterality: N/A;    ENDOSCOPIC ULTRASOUND OF UPPER GASTROINTESTINAL TRACT N/A 1/23/2019    Procedure: ULTRASOUND, UPPER GI TRACT, ENDOSCOPIC;  Surgeon: Jose Hess MD;  Location: [facility] (2ND FLR);  Service: Endoscopy;  Laterality: N/A;    ESOPHAGOGASTRODUODENOSCOPY N/A 11/16/2018    Procedure: EGD (ESOPHAGOGASTRODUODENOSCOPY);  Surgeon: Angelo Reynolds MD;  Location: [facility] (2ND FLR);  Service: Endoscopy;  Laterality: N/A;    ESOPHAGOGASTRODUODENOSCOPY N/A 6/26/2019    Procedure: EGD (ESOPHAGOGASTRODUODENOSCOPY);  Surgeon: Ramiro Jefferson MD;  Location: [facility] (4TH FLR);  Service: Endoscopy;  Laterality: N/A;    EYE SURGERY      Cataract Removal    HYSTERECTOMY      posterolateral fusion with autograft bone and [place] mineralized bone matrix  2/1/13    at [facility] for lumbar spine stenosis    TOE SURGERY      TONSILLECTOMY      TRIGGER FINGER RELEASE         Family History   Problem Relation Age of Onset    Heart disease Father 50        Mi age 50    Colon cancer Father     Bladder Cancer Mother         non smoker    Cataracts Mother     Glaucoma Mother     Heart disease Mother     Hyperlipidemia Mother     Kidney disease Mother     Breast cancer Sister 79    Arthritis Daughter     Asthma Daughter     Depression Daughter     Hypertension Daughter     Stroke Daughter 40    Arthritis Brother     Colon cancer Brother 70    Alcohol abuse Brother     Parkinsonism Brother     Alcohol abuse Brother     Depression Daughter      Celiac disease Neg Hx     Cirrhosis Neg Hx     Colon polyps Neg Hx     Crohn's disease Neg Hx     Cystic fibrosis Neg Hx     Esophageal cancer Neg Hx     Hemochromatosis Neg Hx     Inflammatory bowel disease Neg Hx     Irritable bowel syndrome Neg Hx     Liver cancer Neg Hx     Liver disease Neg Hx     Rectal cancer Neg Hx     Stomach cancer Neg Hx     Ulcerative colitis Neg Hx     Eliazar's disease Neg Hx     Amblyopia Neg Hx     Blindness Neg Hx     Macular degeneration Neg Hx     Retinal detachment Neg Hx     Strabismus Neg Hx     Melanoma Neg Hx        Social History     Socioeconomic History    Marital status:      Spouse name: Not on file    Number of children: Not on file    Years of education: Not on file    Highest education level: Not on file   Occupational History    Not on file   Social Needs    Financial resource strain: Not on file    Food insecurity:     Worry: Not on file     Inability: Not on file    Transportation needs:     Medical: Not on file     Non-medical: Not on file   Tobacco Use    Smoking status: Never Smoker    Smokeless tobacco: Never Used   Substance and Sexual Activity    Alcohol use: Yes     Comment: socially, hardly ever    Drug use: No    Sexual activity: Never     Birth control/protection: Abstinence   Lifestyle    Physical activity:     Days per week: Not on file     Minutes per session: Not on file    Stress: Not on file   Relationships    Social connections:     Talks on phone: Not on file     Gets together: Not on file     Attends [employer] service: Not on file     Active member of club or organization: Not on file     Attends meetings of clubs or organizations: Not on file     Relationship status: Not on file   Other Topics Concern    Are you pregnant or think you may be? Not Asked    Breast-feeding Not Asked   Social History Narrative    , lives alone.  Primary support are her children and friends.       Current  Outpatient Medications on File Prior to Visit   Medication Sig Dispense Refill    acetaZOLAMIDE (DIAMOX) 250 MG tablet Take 1 tablet (250 mg total) by mouth 3 (three) times daily. 90 tablet 11    atorvastatin (LIPITOR) 40 MG tablet TAKE 1 TABLET(40 MG) BY MOUTH EVERY DAY 90 tablet 3    betamethasone dipropionate (DIPROLENE) 0.05 % ointment Apply every Am x 2 weeks then twice weekly 45 g 3    blood sugar diagnostic Strp Dispense [employer] contour strips; test blood sugar once daily 100 strip 11    blood-glucose meter (CONTOUR METER) kit Please dispense [employer] Contour meter and supplies Use as instructed Test Blood sugar once daily 1 each 0    cephALEXin (KEFLEX) 250 MG capsule       diphenhydrAMINE (BENADRYL) 25 mg capsule Take 1 each (25 mg total) by mouth nightly as needed for Itching, Allergies or Insomnia.  0    docusate sodium (COLACE) 100 MG capsule Take 1 capsule (100 mg total) by mouth 2 (two) times daily. 60 capsule 0    DULoxetine (CYMBALTA) 30 MG capsule       fluticasone (FLONASE) 50 mcg/actuation nasal spray 2 sprays by Each Nare route daily as needed.   0    FREESTYLE EFREN 10 DAY SENSOR Kit Check blood glucose every day. Please dispense 6 mos supply. 6 kit 0    FREESTYLE EFREN READER Misc TEST as directed  0    gabapentin (NEURONTIN) 600 MG tablet Take 1,200 mg by mouth 2 (two) times daily.       hydrocortisone 2.5 % cream   0    Lactobacillus rhamnosus GG (CULTURELLE) 10 billion cell capsule Take 1 capsule by mouth once daily.      lancets (ONE TOUCH ULTRASOFT LANCETS) Misc Test blood sugar once daily 200 each 11    levothyroxine (SYNTHROID) 100 MCG tablet TAKE 1 TABLET BY MOUTH EVERY MORNING 90 tablet 0    lidocaine (LIDODERM) 5 %       linaGLIPtin (TRADJENTA) 5 mg Tab tablet Take 1 tablet (5 mg total) by mouth once daily. 90 tablet 3    magnesium 30 mg Tab Take 500 capsules by mouth. Patient's taking magnesium 500mg QD      metFORMIN (GLUCOPHAGE-XR) 750 MG 24 hr tablet TAKE 1  TABLET(750 MG) BY MOUTH TWICE DAILY WITH MEALS 180 tablet 2    methocarbamol (ROBAXIN) 500 MG Tab Take 500 mg by mouth every evening.      mirabegron (MYRBETRIQ) 50 mg Tb24 Take 1 tablet (50 mg total) by mouth once daily. 90 tablet 4    mupirocin (BACTROBAN) 2 % ointment Apply topically 3 (three) times daily. 1 Tube 1    nitrofurantoin, macrocrystal-monohydrate, (MACROBID) 100 MG capsule Take 1 capsule (100 mg total) by mouth 2 (two) times daily. 14 capsule 0    ondansetron (ZOFRAN) 4 MG tablet Take 1 tablet (4 mg total) by mouth every 8 (eight) hours as needed for Nausea. 12 tablet 0    ondansetron (ZOFRAN-ODT) 8 MG TbDL       ospemifene (OSPHENA) 60 mg Tab Take 60 mg by mouth once daily. 90 tablet 1    pantoprazole (PROTONIX) 40 MG tablet Take 1 tablet (40 mg total) by mouth once daily. 90 tablet 3    polyethylene glycol (GLYCOLAX) 17 gram/dose powder Take 17 g by mouth once daily. 235 g 0    PREMARIN vaginal cream PLACE 0.5 GRAM VAGINALLY EVERY EVENING 30 g 3    traMADol (ULTRAM) 50 mg tablet       triamcinolone acetonide 0.1% (KENALOG) 0.1 % paste apply to affected area with A Q-TIP three times a day  0    vitamin D 1000 units Tab Take 185 mg by mouth once daily. D3      azelastine (ASTELIN) 137 mcg nasal spray 1 spray (137 mcg total) by Nasal route 2 (two) times daily. 30 mL 1    cholestyramine-aspartame (QUESTRAN/PREVALITE LIGHT) 4 gram Powd Take 4 g by mouth once daily. 231 g 1     No current facility-administered medications on file prior to visit.        Review of patient's allergies indicates:   Allergen Reactions    Dilaudid [hydromorphone (bulk)] Other (See Comments)     Oversedating, head burning. Pt prefers to avoid.       Codeine Nausea And Vomiting     Other reaction(s): Itching    Percocet  [oxycodone-acetaminophen]      Other reaction(s): Itching    Sulfa (sulfonamide antibiotics)      Other reaction(s): Nausea  Other reaction(s): Itching       Physical Exam:  General: Alert and  Oriented x3, no distress.   There were no vitals filed for this visit. 157/75, 69/min, 68.1kg  HEENT: Normocephalic, Atraumatic. No scleral icterus.  Lymph: No cervical lymphadenopathy  Resp: Good air entry bilaterally, no adventitious sounds.  Cardiac: S1 and S2 normal.  Abdomen: Normoactive bowel sounds. Non-distended. Normal tympany. Soft. Non-tender. No peritoneal signs. Multiple healed abdominal incisions.  Extremities: No peripheral edema. Normal bilateral pedal and radial pulses.  Neurologic: No gross neurological Deficits  Psych: Calm, cooperative. Normal mood and affect.    Laboratory:  Lab Results   Component Value Date     01/17/2020    K 4.5 01/17/2020     01/17/2020    CO2 23 01/17/2020    BUN 13 01/17/2020    CREATININE 0.9 01/17/2020    CALCIUM 9.5 01/17/2020    ANIONGAP 10 01/17/2020    ESTGFRAFRICA >60.0 01/17/2020    EGFRNONAA >60.0 01/17/2020       Lab Results   Component Value Date    ALT 23 01/17/2020    AST 31 01/17/2020    ALKPHOS 46 (L) 01/17/2020    BILITOT 0.6 01/17/2020       Lab Results   Component Value Date    WBC 4.74 01/17/2020    HGB 11.1 (L) 01/17/2020    HCT 35.5 (L) 01/17/2020    MCV 90 01/17/2020    PLT 79 (L) 01/17/2020       Microbiology:  No Pertinent Microbiology    Imaging:  see above    Assessment:  Anayeli Judd is a 75 y.o. female who is presenting for follow-up evaluation of abdominal pain, diarrhea.    Longstanding history of abdominal pain, diarrhea, multiple hospitalizations/OR for small bowel resections. Unclear the etiology of her brief sharp abdominal pain but given increased PO intake (?increased fiber) potentially due to increased bulk of stool. Unclear the etiology of the stranding on recent imaging but given resolution of symptoms suspect was a viral/infectious process. She will submit a stool sample today for evaluation but given resolved symptoms and prior negative suspect it will be negative. She had a negative workup for ischemia previously  (seen by vascular surgery); right sided ischemia would be uncommon. She is uptodate on colonoscopy and given resolution of symptoms will defer repeat colonoscopy at this time.     Unclear etiology of her iron deficiency. She has had multiple small bowel resections therefore anastomotic ulceration is possible with chronic blood loss. Does not appear to have had duodenal resection therefore iron absorption should not be inhibited (consuming meat in diet). Negative celiac biopsy. No signs of overt bleeding and denies non GI bleeding. Is uptodate on EGD/Colonoscopy. Would not be VCE candidate due to small bowel resections and has had multiple negative CTAP and no red flags on review. Recommend transition to IV iron repletion and monitoring of counts.    Plan:  1. No further GI workup at this time unless changes in symptoms  2. Cautioned her on excessive fiber/bulking of stool  3. Will check stool studies to exclude infection  4. Recommend IV iron and monitoring of iron and CBC. If not repleting can consider further GI evaluation  No follow-ups on file.    Guerrero Betts MD  Gastroenterology Fellow  Phone: [phone]    No orders of the defined types were placed in this encounter.

## 2020-02-20 ENCOUNTER — TELEPHONE (OUTPATIENT)
Dept: UROGYNECOLOGY | Facility: CLINIC | Age: 75
End: 2020-02-20

## 2020-02-20 NOTE — TELEPHONE ENCOUNTER
----- Message from Stephanie Ford sent at 2/20/2020  4:22 PM CST -----  Contact: Dr. Almeida Office  Name of Who is Calling :   Dr. Almeida Office    Dr. Almeida Office is requesting a call from staff in regards to getting patient seen tomorrow states patient is having heavy cycle bleeding and clots   .....Please contact to further discuss and advise.    Can the clinic reply by [facility] : No    What Number to Call Back : [phone]

## 2020-02-21 ENCOUNTER — TELEPHONE (OUTPATIENT)
Dept: HEMATOLOGY/ONCOLOGY | Facility: CLINIC | Age: 75
End: 2020-02-21

## 2020-02-21 ENCOUNTER — TELEPHONE (OUTPATIENT)
Dept: UROGYNECOLOGY | Facility: CLINIC | Age: 75
End: 2020-02-21

## 2020-02-21 DIAGNOSIS — E61.1 IRON DEFICIENCY: Primary | ICD-10-CM

## 2020-02-21 NOTE — TELEPHONE ENCOUNTER
Pt stated she was unable to come in today do to an appointment and she had to  her daughter afterwards. Pt requested to be seen on Monday. Appointment was scheduled at 1 pm on Monday, pt agreed, voiced understanding and call ended.

## 2020-02-21 NOTE — TELEPHONE ENCOUNTER
----- Message from Rupal Chaves MA sent at 2/20/2020  4:38 PM CST -----  Name of Who is Calling :   Dr. Almeida Office     Dr. Almeida Office is requesting a call from staff in regards to getting patient seen tomorrow states patient is having heavy cycle bleeding and clots   .....Please contact to further discuss and advise.     Can the clinic reply by [facility] : No     What Number to Call Back : [phone]

## 2020-02-23 RX ORDER — CEPHALEXIN 250 MG/1
250 CAPSULE ORAL DAILY
Qty: 30 CAPSULE | Refills: 0 | Status: SHIPPED | OUTPATIENT
Start: 2020-02-23 | End: 2020-04-02 | Stop reason: SDUPTHER

## 2020-02-24 ENCOUNTER — OFFICE VISIT (OUTPATIENT)
Dept: UROGYNECOLOGY | Facility: CLINIC | Age: 75
End: 2020-02-24
Payer: MEDICARE

## 2020-02-24 VITALS
WEIGHT: 148.13 LBS | SYSTOLIC BLOOD PRESSURE: 110 MMHG | BODY MASS INDEX: 27.26 KG/M2 | HEIGHT: 62 IN | DIASTOLIC BLOOD PRESSURE: 60 MMHG

## 2020-02-24 DIAGNOSIS — N95.2 VAGINAL ATROPHY: ICD-10-CM

## 2020-02-24 DIAGNOSIS — N95.0 POST-MENOPAUSAL BLEEDING: Primary | ICD-10-CM

## 2020-02-24 DIAGNOSIS — N39.46 MIXED STRESS AND URGE URINARY INCONTINENCE: ICD-10-CM

## 2020-02-24 DIAGNOSIS — N81.11 MIDLINE CYSTOCELE: ICD-10-CM

## 2020-02-24 DIAGNOSIS — N81.6 RECTOCELE: ICD-10-CM

## 2020-02-24 DIAGNOSIS — Z46.89 PESSARY MAINTENANCE: ICD-10-CM

## 2020-02-24 PROCEDURE — 99999 PR PBB SHADOW E&M-EST. PATIENT-LVL V: CPT | Mod: PBBFAC,,, | Performed by: NURSE PRACTITIONER

## 2020-02-24 PROCEDURE — 99213 OFFICE O/P EST LOW 20 MIN: CPT | Mod: S$PBB,,, | Performed by: NURSE PRACTITIONER

## 2020-02-24 PROCEDURE — 99213 PR OFFICE/OUTPT VISIT, EST, LEVL III, 20-29 MIN: ICD-10-PCS | Mod: S$PBB,,, | Performed by: NURSE PRACTITIONER

## 2020-02-24 PROCEDURE — 99999 PR PBB SHADOW E&M-EST. PATIENT-LVL V: ICD-10-PCS | Mod: PBBFAC,,, | Performed by: NURSE PRACTITIONER

## 2020-02-24 PROCEDURE — 99215 OFFICE O/P EST HI 40 MIN: CPT | Mod: PBBFAC | Performed by: NURSE PRACTITIONER

## 2020-02-24 NOTE — PROGRESS NOTES
Urogyn follow up  01/27/2020  .  [facility]   [address] [address]    June Hardik Judd  [id number]  1945      Anayeli Judd is a 75 y.o.  here for a urogyn follow up for recurrent uti and vaginal abrasion.    Last HPI from 10/28/2019  1)  Mixed urinary incontinence, urge > stress:    --initially improved with pessary.    --discharge has resolved.     2)  Vaginal atrophy (dryness):    --using estrogen cream  --using osphena      3)  Incomplete bladder emptying:  --can't tell if pessary helping; does have moderate 2nd void volumes by straining.       4)  Frequent UTIs (bladder infections):  --control diabetes  --work on improving bladder emptying  --has not been taking 250 mg maintenance dose     5)  Constipation intermittent with loose stool:  --better with 4-5 tsps/day  --not needing stool softener  --continue use miralax daily--see if taking 1/2 dose is sufficient          Changes from last visit:  1)  Mixed urinary incontinence, urge > stress:    --initially improved with pessary.    --discharge has resolved.     2)  Vaginal atrophy (dryness):    --using estrogen cream  --using osphena      3)  Incomplete bladder emptying:  --can't tell if pessary helping; does have moderate 2nd void volumes by straining.       4)  Frequent UTIs (bladder infections):  --control diabetes  --work on improving bladder emptying  --has not been taking 250 mg maintenance dose     5)  Constipation intermittent with loose stool:  --better with 4-5 tsps/day  --not needing stool softener  --continue use miralax daily--see if taking 1/2 dose is sufficient     6)post menopausal bleeding  --had large amount of blood x 1 day last week.          11/26/2018  Cysto with Togami  FINDINGS:  Dome, anterior, posterior, lateral walls and bladder base free of urothelial abnormalities. Right and left ureteral orifices in the normal postion and configuration, both effluxed clear urine.  Bladder neck  and urethra were normal    11/15/2018  Retroperitoneal ultrasound  Right kidney: Normal in size measuring 10 cm. Increased cortical echogenicity.  No loss of corticomedullary distinction. Resistive index measures 0.70.  No mass. No renal stone. Mild hydronephrosis.  Left kidney: Normal in size measuring 10.1 cm. Increased cortical echogenicity.  No loss of corticomedullary distinction. Resistive index measures 0.74.  No mass. No renal stone. Mildly prominent collecting system, possibly representing mild hydronephrosis.  Urinary bladder is distended but otherwise unremarkable.  Both ureteral jets are seen.  Impression  Mild bilateral hydronephrosis, right side greater than left, similar to recent CT.  Distended urinary bladder with preservation of bilateral ureteral jets.    Past Medical History:   Diagnosis Date    Abdominal pain     Allergic rhinitis     Arthritis     Blood transfusion     during delivery and     Bowel obstruction     Cervical radiculopathy     followed by dr cloud    Colon cancer     transverse colon; resected; Stage IIA (pT3 pN0 MX)    Diarrhea     Family history of breast cancer     Family history of colon cancer     Fatty liver     GERD (gastroesophageal reflux disease)     History of shingles     Hyperlipidemia     Hypothyroidism     Irritable bowel syndrome     Microscopic colitis     treated     Raynaud phenomenon     Raynaud's disease     Type 2 diabetes mellitus        Past Surgical History:   Procedure Laterality Date    APPENDECTOMY      BACK SURGERY      CARPAL TUNNEL RELEASE      bilateral      SECTION      CHOLECYSTECTOMY  1965    COLECTOMY  2011    Transverse colon resection by Dr. Aguirre    COLONOSCOPY N/A 2017    Procedure: COLONOSCOPY;  Surgeon: Manjit Alvarez MD;  Location: [address]);  Service: Endoscopy;  Laterality: N/A;    COLONOSCOPY N/A 2019    Procedure: COLONOSCOPY;  Surgeon: Ramiro Jefferson,  MD;  Location: [facility] (4TH FLR);  Service: Endoscopy;  Laterality: N/A;  PM prep    ENDOSCOPIC ULTRASOUND OF UPPER GASTROINTESTINAL TRACT N/A 12/12/2018    Procedure: ULTRASOUND, UPPER GI TRACT, ENDOSCOPIC;  Surgeon: Jose Hess MD;  Location: [facility] ENDO;  Service: Endoscopy;  Laterality: N/A;    ENDOSCOPIC ULTRASOUND OF UPPER GASTROINTESTINAL TRACT N/A 1/23/2019    Procedure: ULTRASOUND, UPPER GI TRACT, ENDOSCOPIC;  Surgeon: Jose Hess MD;  Location: [facility] (2ND FLR);  Service: Endoscopy;  Laterality: N/A;    ESOPHAGOGASTRODUODENOSCOPY N/A 11/16/2018    Procedure: EGD (ESOPHAGOGASTRODUODENOSCOPY);  Surgeon: Angelo Reynolds MD;  Location: [facility] (2ND FLR);  Service: Endoscopy;  Laterality: N/A;    ESOPHAGOGASTRODUODENOSCOPY N/A 6/26/2019    Procedure: EGD (ESOPHAGOGASTRODUODENOSCOPY);  Surgeon: Ramiro Jefferson MD;  Location: [facility] (4TH FLR);  Service: Endoscopy;  Laterality: N/A;    EYE SURGERY      Cataract Removal    HYSTERECTOMY      posterolateral fusion with autograft bone and [place] mineralized bone matrix  2/1/13    at [facility] for lumbar spine stenosis    TOE SURGERY      TONSILLECTOMY      TRIGGER FINGER RELEASE         Current Outpatient Medications   Medication Sig    acetaZOLAMIDE (DIAMOX) 250 MG tablet Take 1 tablet (250 mg total) by mouth 3 (three) times daily.    atorvastatin (LIPITOR) 40 MG tablet TAKE 1 TABLET(40 MG) BY MOUTH EVERY DAY    betamethasone dipropionate (DIPROLENE) 0.05 % ointment Apply every Am x 2 weeks then twice weekly    blood sugar diagnostic Strp Dispense [employer] contour strips; test blood sugar once daily    blood-glucose meter (CONTOUR METER) kit Please dispense Madie Contour meter and supplies Use as instructed Test Blood sugar once daily    cephALEXin (KEFLEX) 250 MG capsule Take 1 capsule (250 mg total) by mouth once daily.    diphenhydrAMINE (BENADRYL) 25 mg capsule Take 1 each (25 mg total) by mouth nightly as needed for Itching,  Allergies or Insomnia.    docusate sodium (COLACE) 100 MG capsule Take 1 capsule (100 mg total) by mouth 2 (two) times daily.    DULoxetine (CYMBALTA) 30 MG capsule     fluticasone (FLONASE) 50 mcg/actuation nasal spray 2 sprays by Each Nare route daily as needed.     FREESTYLE EFREN 10 DAY SENSOR Kit Check blood glucose every day. Please dispense 6 mos supply.    FREESTYLE EFREN READER Misc TEST as directed    gabapentin (NEURONTIN) 600 MG tablet Take 1,200 mg by mouth 2 (two) times daily.     hydrocortisone 2.5 % cream     Lactobacillus rhamnosus GG (CULTURELLE) 10 billion cell capsule Take 1 capsule by mouth once daily.    lancets (ONE TOUCH ULTRASOFT LANCETS) Misc Test blood sugar once daily    levothyroxine (SYNTHROID) 100 MCG tablet TAKE 1 TABLET BY MOUTH EVERY MORNING    lidocaine (LIDODERM) 5 %     linaGLIPtin (TRADJENTA) 5 mg Tab tablet Take 1 tablet (5 mg total) by mouth once daily.    magnesium 30 mg Tab Take 500 capsules by mouth. Patient's taking magnesium 500mg QD    metFORMIN (GLUCOPHAGE-XR) 750 MG 24 hr tablet TAKE 1 TABLET(750 MG) BY MOUTH TWICE DAILY WITH MEALS    methocarbamol (ROBAXIN) 500 MG Tab Take 500 mg by mouth every evening.    mirabegron (MYRBETRIQ) 50 mg Tb24 Take 1 tablet (50 mg total) by mouth once daily.    mupirocin (BACTROBAN) 2 % ointment Apply topically 3 (three) times daily.    nitrofurantoin, macrocrystal-monohydrate, (MACROBID) 100 MG capsule Take 1 capsule (100 mg total) by mouth 2 (two) times daily.    ondansetron (ZOFRAN) 4 MG tablet Take 1 tablet (4 mg total) by mouth every 8 (eight) hours as needed for Nausea.    ondansetron (ZOFRAN-ODT) 8 MG TbDL     ospemifene (OSPHENA) 60 mg Tab Take 60 mg by mouth once daily.    pantoprazole (PROTONIX) 40 MG tablet Take 1 tablet (40 mg total) by mouth once daily.    polyethylene glycol (GLYCOLAX) 17 gram/dose powder Take 17 g by mouth once daily.    PREMARIN vaginal cream PLACE 0.5 GRAM VAGINALLY EVERY EVENING  "   traMADol (ULTRAM) 50 mg tablet     triamcinolone acetonide 0.1% (KENALOG) 0.1 % paste apply to affected area with A Q-TIP three times a day    vitamin D 1000 units Tab Take 185 mg by mouth once daily. D3    azelastine (ASTELIN) 137 mcg nasal spray 1 spray (137 mcg total) by Nasal route 2 (two) times daily.    cholestyramine-aspartame (QUESTRAN/PREVALITE LIGHT) 4 gram Powd Take 4 g by mouth once daily.     No current facility-administered medications for this visit.        Well woman:  Pap test: post ANGEL.  History of abnormal paps: Yes--had ANGEL.  History of STIs:  No  Mammogram: Date of last: 05/2019 normal  Colonoscopy: Date of last: 2017.  Result:  Benign polyps.  Repeat due:  2019.    DEXA:  Date of last: 05/17/2019 normal     ROS:  As per HPI.      Exam  /60 (BP Location: Right arm, Patient Position: Sitting, BP Method: Medium (Manual))   Ht 5' 2" (1.575 m)   Wt 67.2 kg (148 lb 2.4 oz)   LMP  (LMP Unknown)   BMI 27.10 kg/m²   General: alert and oriented, no acute distress  Scalp: incision in occipital area well healed-- 6 staples intact.   Respiratory: normal respiratory effort  Abd: soft, non-tender, non-distended    Pelvic  Ext. Genitalia:external genitalia no lesions. Atrophic. Normal bartholin's and skeens glands  Vagina: + atrophy.  No lesions noted.  No discharge noted. #2 ring with support pessary in place.   Cervix: absent ? Dimple where cervix was-- possible supracervical hyst  Uterus:  absent   Urethra: no masses or tenderness  Urethral meatus: no lesions, caruncle or prolapse.    Pessary removed without difficulty. Washed with soap and water. Given to patient for pessary holiday.    Impression  1. Post-menopausal bleeding  US Pelvis Comp with Transvag NON-OB (xpd   2. Mixed stress and urge urinary incontinence     3. Midline cystocele     4. Rectocele     5. Vaginal atrophy     6. Pessary maintenance       We reviewed the above issues and discussed options for short-term versus " long-term management of her problems.   Plan:     1)  Mixed urinary incontinence, urge > stress:    --control diabetes  --Empty bladder every 3 hours.  Empty well: wait a minute, lean forward on toilet.    --Avoid dietary irritants (see sheet).  Keep diary x 3-5 days to determine your irritants.  --consider PT in future   --URGE: consider medication (Mirabegron due to SBO).  Takes 2-4 weeks to see if will have effect.  For dry mouth: get sour, sugar free lozenge or gum.    --STRESS:  Pessary vs. Sling.   --#2 ring with support pessary holiday    2)  Vaginal atrophy (dryness):  Use 1 gm vaginal estrogen cream nightly x 1 week.    --continue osphena  --may also reduce bladder infection rate     3)  post menopausal bleeding  --pelvic ultrasound ordered  --will schedule embx vs. ecc if possible    4)  Incomplete bladder emptying:  --resolved with pessary     5)  Frequent UTIs (bladder infections):  --control diabetes  --work on improving bladder emptying  --continue Keflex 250 mg daily  --If you feel like you have a UTI, please call our office so that we can place an order for you to drop off a urine specimen at the closest [facility] lab (we will arrange).                --We will call in antibiotics for you to start right after you drop off specimen.                --In this way, we can determine:                           1)  Do you have a UTI?                            2) If you have a UTI, is it sensitive to the antibiotics we prescribed?   --follow UTI prevention tips (see attached)  --control bowel movements/fecal cross-contamination  --treat vaginal atrophy (dryness)  --empty bladder before and after intercourse  --consider need for further evaluation (pelvic imaging and cystoscopy) if issue persists; 618 CT A/P  normal     6)  Constipation intermittent with loose stool:  Controlling constipation may help bladder urgency/leakage and fiber may better control cholesterol and blood glucose.  Increas daily fiber.   Increase fiber by 1 tablet every 3-5 days until stool is easy to pass. Take fiber tablets with large glass of water.  When find place where stool is easy to pass and more well-formed, less loose/accidents,  stop and continue at that dose.   Do not exceed 6 tablets/day.    --May also use over the counter stool softener 1-2 x/day.  --continue use miralax daily--see if taking 1/2 dose is sufficient     7)  RTC for embx vs ecc      30 minutes were spent in face to face time with this patient  90 % of this time was spent in counseling and/or coordination of care    KENNEDY Petit-BC [facility]  Division of Female Pelvic Medicine and Reconstructive Surgery  Department of Obstetrics & Gynecology

## 2020-02-24 NOTE — PATIENT INSTRUCTIONS
1)  Mixed urinary incontinence, urge > stress:    --control diabetes  --Empty bladder every 3 hours.  Empty well: wait a minute, lean forward on toilet.    --Avoid dietary irritants (see sheet).  Keep diary x 3-5 days to determine your irritants.  --consider PT in future   --URGE: consider medication (Mirabegron due to SBO).  Takes 2-4 weeks to see if will have effect.  For dry mouth: get sour, sugar free lozenge or gum.    --STRESS:  Pessary vs. Sling.   --#2 ring with support pessary holiday    2)  Vaginal atrophy (dryness):  Use 1 gm vaginal estrogen cream nightly x 1 week.    --continue osphena  --may also reduce bladder infection rate     3)  post menopausal bleeding  --pelvic ultrasound ordered  --will schedule embx    4)  Incomplete bladder emptying:  --resolved with pessary     5)  Frequent UTIs (bladder infections):  --control diabetes  --work on improving bladder emptying  --continue Keflex 250 mg daily  --If you feel like you have a UTI, please call our office so that we can place an order for you to drop off a urine specimen at the closest [facility] lab (we will arrange).                --We will call in antibiotics for you to start right after you drop off specimen.                --In this way, we can determine:                           1)  Do you have a UTI?                            2) If you have a UTI, is it sensitive to the antibiotics we prescribed?   --follow UTI prevention tips (see attached)  --control bowel movements/fecal cross-contamination  --treat vaginal atrophy (dryness)  --empty bladder before and after intercourse  --consider need for further evaluation (pelvic imaging and cystoscopy) if issue persists; 618 CT A/P  normal     6)  Constipation intermittent with loose stool:  Controlling constipation may help bladder urgency/leakage and fiber may better control cholesterol and blood glucose.  Increas daily fiber.  Increase fiber by 1 tablet every 3-5 days until stool is easy to  pass. Take fiber tablets with large glass of water.  When find place where stool is easy to pass and more well-formed, less loose/accidents,  stop and continue at that dose.   Do not exceed 6 tablets/day.    --May also use over the counter stool softener 1-2 x/day.  --continue use miralax daily--see if taking 1/2 dose is sufficient     7)  RTC for embx

## 2020-02-26 ENCOUNTER — HOSPITAL ENCOUNTER (OUTPATIENT)
Dept: RADIOLOGY | Facility: OTHER | Age: 75
Discharge: HOME | End: 2020-02-26
Attending: NURSE PRACTITIONER
Payer: MEDICARE

## 2020-02-26 DIAGNOSIS — N95.0 POST-MENOPAUSAL BLEEDING: ICD-10-CM

## 2020-02-26 PROCEDURE — 76856 US EXAM PELVIC COMPLETE: CPT | Mod: 26,,, | Performed by: INTERNAL MEDICINE

## 2020-02-26 PROCEDURE — 76830 TRANSVAGINAL US NON-OB: CPT | Mod: TC

## 2020-02-26 PROCEDURE — 76830 US PELVIS COMP WITH TRANSVAG NON-OB (XPD): ICD-10-PCS | Mod: 26,,, | Performed by: INTERNAL MEDICINE

## 2020-02-26 PROCEDURE — 76830 TRANSVAGINAL US NON-OB: CPT | Mod: 26,,, | Performed by: INTERNAL MEDICINE

## 2020-02-26 PROCEDURE — 76856 US PELVIS COMP WITH TRANSVAG NON-OB (XPD): ICD-10-PCS | Mod: 26,,, | Performed by: INTERNAL MEDICINE

## 2020-02-28 ENCOUNTER — LAB VISIT (OUTPATIENT)
Dept: LAB | Facility: HOSPITAL | Age: 75
End: 2020-02-28
Attending: INTERNAL MEDICINE
Payer: MEDICARE

## 2020-02-28 ENCOUNTER — OFFICE VISIT (OUTPATIENT)
Dept: HEMATOLOGY/ONCOLOGY | Facility: CLINIC | Age: 75
End: 2020-02-28
Payer: MEDICARE

## 2020-02-28 VITALS
HEIGHT: 64 IN | RESPIRATION RATE: 18 BRPM | BODY MASS INDEX: 25.21 KG/M2 | OXYGEN SATURATION: 99 % | DIASTOLIC BLOOD PRESSURE: 63 MMHG | WEIGHT: 147.69 LBS | SYSTOLIC BLOOD PRESSURE: 136 MMHG | HEART RATE: 67 BPM | TEMPERATURE: 98 F

## 2020-02-28 DIAGNOSIS — D50.0 IRON DEFICIENCY ANEMIA DUE TO CHRONIC BLOOD LOSS: Primary | ICD-10-CM

## 2020-02-28 DIAGNOSIS — N95.0 POSTMENOPAUSAL BLEEDING: ICD-10-CM

## 2020-02-28 DIAGNOSIS — I82.629 ACUTE DEEP VEIN THROMBOSIS (DVT) OF OTHER VEIN OF UPPER EXTREMITY, UNSPECIFIED LATERALITY: ICD-10-CM

## 2020-02-28 DIAGNOSIS — E61.1 IRON DEFICIENCY: ICD-10-CM

## 2020-02-28 DIAGNOSIS — K56.600 PARTIAL SMALL BOWEL OBSTRUCTION: ICD-10-CM

## 2020-02-28 DIAGNOSIS — E11.9 TYPE 2 DIABETES MELLITUS WITHOUT COMPLICATION, WITHOUT LONG-TERM CURRENT USE OF INSULIN: Chronic | ICD-10-CM

## 2020-02-28 DIAGNOSIS — Z85.038 HISTORY OF COLON CANCER: ICD-10-CM

## 2020-02-28 LAB
ERYTHROCYTE [DISTWIDTH] IN BLOOD BY AUTOMATED COUNT: 15.3 % (ref 11.5–14.5)
FERRITIN SERPL-MCNC: 23 NG/ML (ref 20–300)
HCT VFR BLD AUTO: 38.3 % (ref 37–48.5)
HGB BLD-MCNC: 11.7 G/DL (ref 12–16)
IMM GRANULOCYTES # BLD AUTO: 0.02 K/UL (ref 0–0.04)
IRON SERPL-MCNC: 41 UG/DL (ref 30–160)
MCH RBC QN AUTO: 29.3 PG (ref 27–31)
MCHC RBC AUTO-ENTMCNC: 30.5 G/DL (ref 32–36)
MCV RBC AUTO: 96 FL (ref 82–98)
NEUTROPHILS # BLD AUTO: 2.7 K/UL (ref 1.8–7.7)
PLATELET # BLD AUTO: 91 K/UL (ref 150–350)
PMV BLD AUTO: 11.7 FL (ref 9.2–12.9)
RBC # BLD AUTO: 3.99 M/UL (ref 4–5.4)
SATURATED IRON: 7 % (ref 20–50)
TOTAL IRON BINDING CAPACITY: 582 UG/DL (ref 250–450)
TRANSFERRIN SERPL-MCNC: 393 MG/DL (ref 200–375)
WBC # BLD AUTO: 5.05 K/UL (ref 3.9–12.7)

## 2020-02-28 PROCEDURE — 83540 ASSAY OF IRON: CPT

## 2020-02-28 PROCEDURE — 99215 OFFICE O/P EST HI 40 MIN: CPT | Mod: PBBFAC | Performed by: PHYSICIAN ASSISTANT

## 2020-02-28 PROCEDURE — 85027 COMPLETE CBC AUTOMATED: CPT

## 2020-02-28 PROCEDURE — 99214 PR OFFICE/OUTPT VISIT, EST, LEVL IV, 30-39 MIN: ICD-10-PCS | Mod: S$PBB,,, | Performed by: PHYSICIAN ASSISTANT

## 2020-02-28 PROCEDURE — 99214 OFFICE O/P EST MOD 30 MIN: CPT | Mod: S$PBB,,, | Performed by: PHYSICIAN ASSISTANT

## 2020-02-28 PROCEDURE — 99999 PR PBB SHADOW E&M-EST. PATIENT-LVL V: ICD-10-PCS | Mod: PBBFAC,,, | Performed by: PHYSICIAN ASSISTANT

## 2020-02-28 PROCEDURE — 82728 ASSAY OF FERRITIN: CPT

## 2020-02-28 PROCEDURE — 36415 COLL VENOUS BLD VENIPUNCTURE: CPT

## 2020-02-28 PROCEDURE — 99999 PR PBB SHADOW E&M-EST. PATIENT-LVL V: CPT | Mod: PBBFAC,,, | Performed by: PHYSICIAN ASSISTANT

## 2020-02-28 RX ORDER — HEPARIN 100 UNIT/ML
500 SYRINGE INTRAVENOUS
Status: CANCELLED | OUTPATIENT
Start: 2020-03-04

## 2020-02-28 RX ORDER — SODIUM CHLORIDE 0.9 % (FLUSH) 0.9 %
10 SYRINGE (ML) INJECTION
Status: CANCELLED | OUTPATIENT
Start: 2020-03-04

## 2020-02-28 NOTE — PROGRESS NOTES
Subjective:       Patient ID: Anayeli Judd is a 75 y.o. female.    Chief Complaint: Iron deficiency anemia    History:  Past Medical History:   Diagnosis Date    Abdominal pain     Allergic rhinitis     Arthritis     Blood transfusion     during delivery and     Bowel obstruction     Cervical radiculopathy     followed by dr reynolds    Colon cancer     transverse colon; resected; Stage IIA (pT3 pN0 MX)    Diarrhea     Family history of breast cancer     Family history of colon cancer     Fatty liver     GERD (gastroesophageal reflux disease)     History of shingles     Hyperlipidemia     Hypothyroidism     Irritable bowel syndrome     Microscopic colitis     treated     Raynaud phenomenon     Raynaud's disease     Type 2 diabetes mellitus      Past Surgical History:   Procedure Laterality Date    APPENDECTOMY      BACK SURGERY      CARPAL TUNNEL RELEASE      bilateral      SECTION      CHOLECYSTECTOMY  1965    COLECTOMY  2011    Transverse colon resection by Dr. Aguirre    COLONOSCOPY N/A 2017    Procedure: COLONOSCOPY;  Surgeon: Manjit Alvarez MD;  Location: [facility]);  Service: Endoscopy;  Laterality: N/A;    COLONOSCOPY N/A 2019    Procedure: COLONOSCOPY;  Surgeon: Ramiro Jefferson MD;  Location: [facility] (4TH FLR);  Service: Endoscopy;  Laterality: N/A;  PM prep    ENDOSCOPIC ULTRASOUND OF UPPER GASTROINTESTINAL TRACT N/A 2018    Procedure: ULTRASOUND, UPPER GI TRACT, ENDOSCOPIC;  Surgeon: Jose Hess MD;  Location: [facility];  Service: Endoscopy;  Laterality: N/A;    ENDOSCOPIC ULTRASOUND OF UPPER GASTROINTESTINAL TRACT N/A 2019    Procedure: ULTRASOUND, UPPER GI TRACT, ENDOSCOPIC;  Surgeon: Jose Hess MD;  Location: [facility] (2ND FLR);  Service: Endoscopy;  Laterality: N/A;    ESOPHAGOGASTRODUODENOSCOPY N/A 2018    Procedure: EGD (ESOPHAGOGASTRODUODENOSCOPY);  Surgeon: Angelo Reynolds MD;   Location: [facility] (2ND FLR);  Service: Endoscopy;  Laterality: N/A;    ESOPHAGOGASTRODUODENOSCOPY N/A 6/26/2019    Procedure: EGD (ESOPHAGOGASTRODUODENOSCOPY);  Surgeon: Ramiro Jefferson MD;  Location: [facility] (4TH FLR);  Service: Endoscopy;  Laterality: N/A;    EYE SURGERY      Cataract Removal    HYSTERECTOMY      posterolateral fusion with autograft bone and Cecil mineralized bone matrix  2/1/13    at [facility] for lumbar spine stenosis    TOE SURGERY      TONSILLECTOMY      TRIGGER FINGER RELEASE         No history exists.        Allergies:  Review of patient's allergies indicates:   Allergen Reactions    Dilaudid [hydromorphone (bulk)] Other (See Comments)     Oversedating, head burning. Pt prefers to avoid.       Codeine Nausea And Vomiting     Other reaction(s): Itching    Percocet  [oxycodone-acetaminophen]      Other reaction(s): Itching    Sulfa (sulfonamide antibiotics)      Other reaction(s): Nausea  Other reaction(s): Itching        HPI Anayeli Judd is a 75 y.o. female with a history of CRC, multiple admissions for SBO, IBS, microscopic colitis, HLD, T2DM, GERD, Hypothyroidism, who is presenting for follow-up evaluation of abdominal pain.     Was recently seen in the ED (1/17/20) due to mechanical fall while getting manicure. While in the ED requested a CTAP which demonstrated wall thickening and inflammatory changes of the cecum and right colon for which she was prescribed course of antibiotics. Unclear if she was having increased abdominal symptoms at that time. Per review of ED notes she was not having increased symptoms beyond baseline (reflected in my initial discussion with her on the phone) but in clinic she does endorse at that time increased abdominal pain and brief diarrhea. She has recently been seen and evaluated by her Gastroenterologist, Dr. Alvarez.    GI Hx:  Longstanding history of abdominal pain, diarrhea, multiple hospitalizations/OR for small bowel resections. Unclear  the etiology of her brief sharp abdominal pain but given increased PO intake (?increased fiber) potentially due to increased bulk of stool. Unclear the etiology of the stranding on recent imaging but given resolution of symptoms suspect was a viral/infectious process. She will submit a stool sample today for evaluation but given resolved symptoms and prior negative suspect it will be negative. She had a negative workup for ischemia previously (seen by vascular surgery); right sided ischemia would be uncommon. She is uptodate on colonoscopy and given resolution of symptoms will defer repeat colonoscopy at this time.      Unclear etiology of her iron deficiency per Dr. Alvarez. She has had multiple small bowel resections therefore anastomotic ulceration is possible with chronic blood loss. Does not appear to have had duodenal resection therefore iron absorption should not be inhibited (consuming meat in diet). Negative celiac biopsy. No signs of overt bleeding and denies non GI bleeding. Is uptodate on EGD/Colonoscopy. Would not be VCE candidate due to small bowel resections and has had multiple negative CTAP and no red flags on review. Recommend transition to IV iron repletion and monitoring of counts. Hence, she is seeing us today in clinic for IV iron supplementation.  It should be noted that she has not had a capsule endoscopy performed. She is also concerned with vaginal bleeding but has seen her Urogynecologist wherein an ultrasound was performed and read as negative. Hence, she is scheduled for a embx on 3/6/2020. Abdominal pain is stable and her diarrhea has not worsened. She is alone today. ECOG status is 0.      Recently started on iron due to iron deficiency, but stopped taking recently (no clear reason).     PEndoHx:  EUS. (1/23/19) Provider: Dr. Hess. Indication: CT Abnormality.   - heterogeneous appearance of liver  - normal appearance of the pancreas  - minor superficial mucosal tear at the  cricopharyngeus  - non-bleeding gastric ulcer     EGD. (6/26/19). Dr. Roca. Indication:Abdominal pain, acute gastric ulcer f/u  - normal study  - duodenal and gastric biopsies negative. Negative HPylori.  -Recommend repeat EGD in 3 years     Colonoscopy. (6/26/19) Provider: Dr. Alvarez.  Change in bowel habits, Constipation and history of colon adenoma.   A 2 mm polyp was found in the transverse colon. The polyp was sessile. The polyp was removed with a cold snare. Resection and retrieval were complete. Estimated blood loss was minimal. A few small and large-mouthed diverticula were found in the recto-sigmoid colon, sigmoid colon, descending colon, transverse colon and ascending colon.  Repeat colonoscopy recommended in 3-5 years    ECOG:  ECOG SCORE            Review of Systems   Constitutional: Negative for appetite change, chills, fatigue, fever and unexpected weight change.   HENT: Negative for congestion, facial swelling, mouth sores, sinus pressure, sinus pain, sore throat and trouble swallowing.    Eyes: Negative for photophobia, pain and visual disturbance.   Respiratory: Negative for cough, chest tightness, shortness of breath, wheezing and stridor.    Cardiovascular: Negative for chest pain and leg swelling.   Gastrointestinal: Positive for abdominal pain and diarrhea. Negative for abdominal distention, blood in stool, constipation, nausea, rectal pain and vomiting.   Genitourinary: Positive for vaginal bleeding. Negative for dysuria, flank pain, frequency and urgency.   Musculoskeletal: Negative for back pain, gait problem, joint swelling and neck pain.   Skin: Negative for color change and rash.   Neurological: Negative for dizziness, syncope, weakness, light-headedness, numbness and headaches.   Hematological: Negative for adenopathy. Does not bruise/bleed easily.   Psychiatric/Behavioral: Negative for agitation, behavioral problems and confusion. The patient is nervous/anxious.        Objective:       Physical Exam   Constitutional: She is oriented to person, place, and time. She appears well-developed and well-nourished. No distress.   HENT:   Head: Normocephalic and atraumatic.   Mouth/Throat: No oropharyngeal exudate.   Eyes: Pupils are equal, round, and reactive to light. Conjunctivae and EOM are normal. No scleral icterus.   Neck: Normal range of motion. Neck supple.   Cardiovascular: Normal rate and regular rhythm. Exam reveals no gallop and no friction rub.   No murmur heard.  Pulmonary/Chest: Breath sounds normal. No respiratory distress. She exhibits no tenderness.   Abdominal: Soft. Bowel sounds are normal. She exhibits no distension and no mass. There is no tenderness. There is no rebound and no guarding. No hernia.   Musculoskeletal: Normal range of motion. She exhibits no edema or tenderness.   Lymphadenopathy:     She has no cervical adenopathy.   Neurological: She is alert and oriented to person, place, and time.   Skin: Skin is warm and dry. Capillary refill takes less than 2 seconds. No rash noted. No erythema.   Psychiatric: She has a normal mood and affect. Her behavior is normal. Judgment and thought content normal.   Nursing note and vitals reviewed.      Results:   Contains abnormal data CBC Oncology   Order: [id number]   Status:  Final result   Visible to patient:  Yes (Patient Portal)   Next appt:  03/06/2020 at 01:30 PM in Urogynecology (Blanka Blair NP)   Dx:  Iron deficiency    Ref Range & Units 08:22   WBC 3.90 - 12.70 K/uL 5.05    RBC 4.00 - 5.40 M/uL 3.99Low     Hemoglobin 12.0 - 16.0 g/dL 11.7Low     Hematocrit 37.0 - 48.5 % 38.3    Mean Corpuscular Volume 82 - 98 fL 96    Mean Corpuscular Hemoglobin 27.0 - 31.0 pg 29.3    Mean Corpuscular Hemoglobin Conc 32.0 - 36.0 g/dL 30.5Low     RDW 11.5 - 14.5 % 15.3High     Platelets 150 - 350 K/uL 91Low     MPV 9.2 - 12.9 fL 11.7    Gran # (ANC) 1.8 - 7.7 K/uL 2.7    Comment: The ANC is based on a white cell differential from an    automated cell counter. It has not been microscopically   reviewed for the presence of abnormal cells. Clinical   correlation is required.    Immature Grans (Abs) 0.00 - 0.04 K/uL 0.02    Comment: Mild elevation in immature granulocytes is non specific and   can be seen in a variety of conditions including stress response,   acute inflammation, trauma and pregnancy. Correlation with other   laboratory and clinical findings is essential.            Ferritin   Order: [id number]   Status:  Final result   Visible to patient:  Yes (Patient Portal)   Next appt:  03/06/2020 at 01:30 PM in Urogynecology (Blanka Blair NP)   Dx:  Iron deficiency    Ref Range & Units 08:22   Ferritin 20.0 - 300.0 ng/mL 23            Contains abnormal data Iron and TIBC   Order: [id number]   Status:  Final result   Visible to patient:  Yes (Patient Portal)   Next appt:  03/06/2020 at 01:30 PM in Urogynecology (Blanka Blair NP)   Dx:  Iron deficiency    Ref Range & Units 08:22   Iron 30 - 160 ug/dL 41    Transferrin 200 - 375 mg/dL 393High     TIBC 250 - 450 ug/dL 582High     Saturated Iron 20 - 50 % 7Low              Colonoscopy   Order: [id number]   Status:  Final result   Visible to patient:  Yes (Patient Portal)   Next appt:  03/06/2020 at 01:30 PM in Urogynecology (Blanka Blair NP)      Narrative   Performed by: [facility]   Patient Name: June Tumoriahi  Procedure Date: 6/26/2019 2:17 PM  MRN: [id number]  Account Number: [id number]  YOB: 1945  Age: 74  Room: [facility]                     Gender: Female  Attending MD: Ramiro Jefferson MD  Procedure:            Colonoscopy  Indications:          Change in bowel habits, Constipation and history of                         colon adenoma.  Comorbidities       History of colon adenoma. s/p 4/1/2017:Exploratory laparotomy,        extensive complex lysis of adhesions, small-bowel repair x14 and        insertion of gastrostomy tube. 9/2/2014: POSTOPERATIVE DIAGNOSIS:         Intestinal obstruction, small bowel obstruction.PROCEDURES:1.        Exploratory laparotomy.2. Enterolysis for massive small bowel        adhesions.3. Small bowel resection x2. Prior colonoscopy for        diarrhea showed no Microscopic colitis. Colonic mucosa with no        significant histologic abnormality.  Providers:            Ramiro Jefferson MD, Richelle Lynn CRNA,                         Jenny Bird RN, Krystal Dailey, Technician  Patient Profile:      This is a 74 year old female. Refer to note in                         patient chart for documentation of history and                         physical.  Referring MD:         Guerrero Betts MD, Rocio Mc MD  Complications:        No immediate complications.  Medicines:            General Anesthesia, See the Anesthesia note for                         documentation of the administered medications  Procedure:            Pre-Anesthesia Assessment:                        - Prior to the procedure, a History and Physical                         was performed, and patient medications and                         allergies were reviewed. The patient is competent.                         The risks and benefits of the procedure and the                         sedation options and risks were discussed with the                         patient. All questions were answered and informed                         consent was obtained. Patient identification and                         proposed procedure were verified by the physician,                         the nurse and the anesthesiologist in the                         pre-procedure area in the procedure room in the                         endoscopy suite. Mental Status Examination: alert                         and oriented. Airway Examination: normal                         oropharyngeal airway and neck mobility. Respiratory                         Examination: clear to auscultation. CV Examination:                          normal. Prophylactic Antibiotics: The patient does                         not require prophylactic antibiotics. Prior                         Anticoagulants: The patient has taken previous                         NSAID medication. ASA Grade Assessment: anesthesia                         case. After reviewing the risks and benefits, the                         patient was deemed in satisfactory condition to                         undergo the procedure. The anesthesia plan was to                         use general anesthesia. Immediately prior to                         administration of medications, the patient was                         re-assessed for adequacy to receive sedatives. The                         heart rate, respiratory rate, oxygen saturations,                         blood pressure, adequacy of pulmonary ventilation,                         and response to care were monitored throughout the                         procedure. The physical status of the patient was                         re-assessed after the procedure.                        After I obtained informed consent, the scope was                         passed under direct vision. Throughout the                         procedure, the patient's blood pressure, pulse, and                         oxygen saturations were monitored continuously.                        The upper GI endoscopy was accomplished without                         difficulty. The patient tolerated the procedure                         well. After I obtained informed consent, the scope                         was passed under direct vision. Throughout the                         procedure, the patient's blood pressure, pulse, and                         oxygen saturations were monitored continuously.                        The Olympus scope [id number]-[id number] ([id number]) was                         introduced through the anus and advanced to the                          terminal ileum, with identification of the                         appendiceal orifice and IC valve. The quality of                         the bowel preparation was excellent. Scope                         insertion time was 3 minutes. Scope withdrawal time                         was 13 minutes. Good look. The terminal ileum,                         ileocecal valve, appendiceal orifice, and rectum                         were photographed.  Findings:       The terminal ileum appeared normal.       A 2 mm polyp was found in the transverse colon. The polyp was        sessile. The polyp was removed with a cold snare. Resection and        retrieval were complete. Estimated blood loss was minimal.       A few small and large-mouthed diverticula were found in the        recto-sigmoid colon, sigmoid colon, descending colon, transverse        colon and ascending colon.       The perianal and digital rectal examinations were normal.       The retroflexed view of the distal rectum and anal verge was normal        and showed no anal or rectal abnormalities.       Non-bleeding internal hemorrhoids were found during retroflexion.        The hemorrhoids were mild.       The exam was otherwise without abnormality.  Impression:           - The examined portion of the ileum was normal.                        - No specimens collected.  Recommendation:       - Discharge patient to home.                        - Repeat colonoscopy in 3 - 5 years for                         surveillance based on pathology results.                        - Telephone endoscopist for pathology results in 2                         weeks.                        - Telephone referring physician for study results                         in 2 weeks.                        - Return to primary care physician.                        - Consider avoiding all non-steroidal                         anti-inflammatory drugs (aspirin, ibuprofen,                          naproxen, etc.), unless needed for cardiovascular                         protection. Recommend you discuss with your                         prescribing doctor (of your aspirin) to see if                         cardiovascular benefits of your aspirin outweigh                         the risks of GI bleeding.  Attending Participation:       I personally performed the entire procedure.  Ramiro Jefferson MD  6/26/2019 3:52:36 PM           Upper GI endoscopy   Order: [id number]   Status:  Final result   Visible to patient:  Yes (Patient Portal)   Next appt:  03/06/2020 at 01:30 PM in Urogynecology (Blanka Blair NP)      Narrative   Performed by: [facility]   Patient Name: Anayeli Dutch  Procedure Date: 6/26/2019 2:17 PM  MRN: [id number]  Account Number: [id number]  YOB: 1945  Age: 74  Room: [facility]                     Gender: Female  Attending MD: Ramiro Jefferson MD  Procedure:            Upper GI endoscopy  Indications:          Abdominal pain, , Acute gastric ulcer, Follow-up of                         acute gastric ulcer. Patient is taking Pantoprazole                         40mg once daily.  Comorbidities       History of colon adenoma. s/p 4/1/2017:Exploratory laparotomy,        extensive complex lysis of       adhesions, small-bowel repair x14 and insertion of gastrostomy tube.        9/2/2014: POSTOPERATIVE DIAGNOSIS: Intestinal obstruction, small        bowel obstruction.PROCEDURES:       1. Exploratory laparotomy.2. Enterolysis for massive small bowel        adhesions.3. Small bowel resection x2.  Providers:            Ramiro Jefferson MD, Richelle Lynn CRNA,                         Krystal Dailey, Technician, Blanca Jane RN  Patient Profile:      This is a 74 year old female. Refer to note in                         patient chart for documentation of history and                         physical.  Referring MD:         Guerrero Betts MD, Rocio Mc  MD  Complications:        No immediate complications.  Medicines:            General Anesthesia, See the Anesthesia note for                         documentation of the administered medications  Procedure:            Pre-Anesthesia Assessment:                        - Prior to the procedure, a History and Physical                         was performed, and patient medications and                         allergies were reviewed. The patient is competent.                         The risks and benefits of the procedure and the                         sedation options and risks were discussed with the                         patient. All questions were answered and informed                         consent was obtained. Patient identification and                         proposed procedure were verified by the physician,                         the nurse and the anesthesiologist in the                         pre-procedure area in the procedure room in the                         endoscopy suite. Mental Status Examination: alert                         and oriented. Airway Examination: normal                         oropharyngeal airway and neck mobility. Respiratory                         Examination: clear to auscultation. CV Examination:                         normal. Prophylactic Antibiotics: The patient does                         not require prophylactic antibiotics. Prior                         Anticoagulants: The patient has taken previous                         NSAID medication. ASA Grade Assessment: anesthesia                         case. After reviewing the risks and benefits, the                         patient was deemed in satisfactory condition to                         undergo the procedure. The anesthesia plan was to                         use general anesthesia. Immediately prior to                         administration of medications, the patient was                         re-assessed for  adequacy to receive sedatives. The                         heart rate, respiratory rate, oxygen saturations,                         blood pressure, adequacy of pulmonary ventilation,                         and response to care were monitored throughout the                         procedure. The physical status of the patient was                         re-assessed after the procedure.                        After obtaining informed consent, the endoscope was                         passed under direct vision. Throughout the                         procedure, the patient's blood pressure, pulse, and                         oxygen saturations were monitored continuously. The                         Olympus scope PCF-[id number] ([id number]) was introduced                         through the anus and advanced to the third part of                         duodenum. After obtaining informed consent, the                         endoscope was passed under direct vision.                         Throughout the procedure, the patient's blood                         pressure, pulse, and oxygen saturations were                         monitored continuously.  Findings:       The examined duodenum was normal. Biopsies for histology were taken        with a cold forceps for evaluation of celiac disease. Biopsies were        taken with a cold forceps for histology. Estimated blood loss was        minimal.       The entire examined stomach was normal. Biopsies for histology were        taken with a cold forceps for evaluation of celiac disease. Biopsies        were taken with a cold forceps for histology. Estimated blood loss        was minimal.       The cardia and gastric fundus were normal on retroflexion.       A 1 cm hiatal hernia was found. The proximal extent of the gastric        folds (end of tubular esophagus) was 35 cm from the incisors. The        hiatal narrowing was 36 cm from the incisors. The Z-line was 34 cm        from  the incisors. Biopsies were taken with a cold forceps for        histology. C0M1. A 2mm oval island of columnar appearing distal        esophageal mucosa at 34cm bxed. No erosive esophagitis and no other        lesions seen with NBI or white light.  Impression:           - Normal examined duodenum. Biopsied.                        - Normal stomach. Biopsied. No ulcers seen.                        - 1 cm hiatal hernia. Biopsied.  Recommendation:       - Discharge patient to home.                        - Follow an antireflux regimen.                        - Await pathology results.                        - Telephone endoscopist for pathology results in 2                         weeks.                        - Repeat upper endoscopy in 3 years for                         surveillance based on pathology results.                        - Consider avoiding all non-steroidal                         anti-inflammatory drugs (Mobic, ibuprofen,                         naproxen, etc.), unless needed for cardiovascular                         protection. Recommend you discuss with your                         prescribing doctor (of your aspirin) to see if                         cardiovascular benefits of your aspirin outweigh                         the risks of GI bleeding. O.K. to take Aspirin if                         needed for Cardiovascular protection.                        - The findings and recommendations were discussed                         with the patient.                        - The findings and recommendations were discussed                         with the patient's primary physician.                        - Return to primary care physician.  Attending Participation:       I personally performed the entire procedure.  Ramiro Jefferson MD  6/26/2019 3:55:04 PM  This report has been verified and signed electronically.  Number of Addenda: 0  Note Initiated On: 6/26/2019 2:17 PM  Estimated Blood Loss:  Estimated blood loss was minimal.       This report has been verified and signed electronically.             Assessment:   1. Iron deficiency anemia  2. Hx of SBO, partial  3. Postmenopausal vaginal bleeding.   4. Type II DM without long-term current use of insulin  5. Acute DVT of upper extremity  6. Hx of colon cancer    Plan:   1. Will administer IV iron with Injectafer 750mg IV q2 weeks for 1 cycle. Will have repeat lab work with PCP in approx. 6-8 weeks. Will also request a video capsule endoscopy to be performed with Gastroenterology to evaluate for possible duodenal ulcers. Orders have been placed.   -Will also continue to have her f/u with her PCP if needed for lab work.     2. Stable. Had recent endoscopies that were negative for recurrence. Physical exam is unremarkable. Will continue to f/u with Dr. Jefferson as needed.     3. Scheduled for embx on 3/6/2020 with Urogyn team. Will continue to monitor.     4. Will f/u with Dr. Mc for monitoring. Due to have repeat lab work in May. C/w medications.     5. Stable. No evidence for recurrence.     6. Doing well. Has been off of chemotherapy since 2011. Colonoscopy has been negative. Will see her back as needed.     Pte displayed understanding of the above encounter and treatment plan. All thoughtful questions were answered to their satisfaction. Pte was advised to notify the care team or proceed to the ER if signs and symptoms worsen.

## 2020-02-28 NOTE — Clinical Note
Please schedule iron, ferritin and TIBC level in 6 weeks. No clinic visit is needed at that time. Will call patient with results. She will also need to be scheduled for IV Injectafer 750mg next week and during the 3rd week in March. Thank you

## 2020-03-02 ENCOUNTER — TELEPHONE (OUTPATIENT)
Dept: HEMATOLOGY/ONCOLOGY | Facility: CLINIC | Age: 75
End: 2020-03-02

## 2020-03-05 ENCOUNTER — INFUSION (OUTPATIENT)
Dept: INFUSION THERAPY | Facility: HOSPITAL | Age: 75
End: 2020-03-05
Attending: NURSE PRACTITIONER
Payer: MEDICARE

## 2020-03-05 VITALS
HEART RATE: 65 BPM | HEIGHT: 64 IN | DIASTOLIC BLOOD PRESSURE: 57 MMHG | OXYGEN SATURATION: 99 % | TEMPERATURE: 98 F | WEIGHT: 147.69 LBS | SYSTOLIC BLOOD PRESSURE: 120 MMHG | RESPIRATION RATE: 18 BRPM | BODY MASS INDEX: 25.21 KG/M2

## 2020-03-05 DIAGNOSIS — D50.0 IRON DEFICIENCY ANEMIA DUE TO CHRONIC BLOOD LOSS: Primary | ICD-10-CM

## 2020-03-05 PROCEDURE — 63600175 PHARM REV CODE 636 W HCPCS: Performed by: PHYSICIAN ASSISTANT

## 2020-03-05 PROCEDURE — 96365 THER/PROPH/DIAG IV INF INIT: CPT

## 2020-03-05 RX ORDER — SODIUM CHLORIDE 0.9 % (FLUSH) 0.9 %
10 SYRINGE (ML) INJECTION
Status: DISCONTINUED | OUTPATIENT
Start: 2020-03-05 | End: 2020-03-05 | Stop reason: HOSPADM

## 2020-03-05 RX ORDER — HEPARIN 100 UNIT/ML
500 SYRINGE INTRAVENOUS
Status: DISCONTINUED | OUTPATIENT
Start: 2020-03-05 | End: 2020-03-05 | Stop reason: HOSPADM

## 2020-03-05 RX ADMIN — FERRIC CARBOXYMALTOSE INJECTION 750 MG: 50 INJECTION, SOLUTION INTRAVENOUS at 04:03

## 2020-03-05 NOTE — PLAN OF CARE
Pt received Injectafer today and tolerated well, without complications. VSS throughout infusion. After infusion, pt observed for 15' post for s/s of adverse reactions, none found. Educated patient about Injectafer (indications, side effects, possible reactions) and verbalized understanding.  PIV positive for blood return, saline locked and removed prior to DC, catheter tip intact. Pt DC with no distress noted, ambulated off of unit, pleased.

## 2020-03-06 ENCOUNTER — PROCEDURE VISIT (OUTPATIENT)
Dept: UROGYNECOLOGY | Facility: CLINIC | Age: 75
End: 2020-03-06
Payer: MEDICARE

## 2020-03-06 VITALS
DIASTOLIC BLOOD PRESSURE: 70 MMHG | HEIGHT: 64 IN | BODY MASS INDEX: 24.35 KG/M2 | SYSTOLIC BLOOD PRESSURE: 120 MMHG | WEIGHT: 142.63 LBS

## 2020-03-06 DIAGNOSIS — N93.9 VAGINAL BLEEDING: Primary | ICD-10-CM

## 2020-03-06 PROCEDURE — 88305 TISSUE EXAM BY PATHOLOGIST: CPT | Performed by: PATHOLOGY

## 2020-03-06 PROCEDURE — 57500 BIOPSY (GYNECOLOGICAL): ICD-10-PCS | Mod: S$PBB,,, | Performed by: NURSE PRACTITIONER

## 2020-03-06 PROCEDURE — 88305 TISSUE EXAM BY PATHOLOGIST: CPT | Mod: 26,,, | Performed by: PATHOLOGY

## 2020-03-06 PROCEDURE — 88305 TISSUE EXAM BY PATHOLOGIST: ICD-10-PCS | Mod: 26,,, | Performed by: PATHOLOGY

## 2020-03-06 PROCEDURE — 57500 BIOPSY OF CERVIX: CPT | Mod: PBBFAC | Performed by: NURSE PRACTITIONER

## 2020-03-06 PROCEDURE — 57500 BIOPSY OF CERVIX: CPT | Mod: S$PBB,,, | Performed by: NURSE PRACTITIONER

## 2020-03-06 NOTE — PROCEDURES
Biopsy (Gynecological)  Date/Time: 3/6/2020 1:30 PM  Performed by: Blanka Blair NP  Authorized by: Blanka Blair NP     Consent Done?:  Yes (Written)   Patient was prepped and draped in the normal sterile fashion.  Local anesthesia used?: No      Biopsy Location:  Cervix  Estimated blood loss (cc):  0   Patient tolerated the procedure well with no immediate complications.     PROCEDURE (EMBx):  The patient was placed in dorsal lithotomy position.  A sterile speculum was used to identify the cervix. The ectocervix was prepped x 2 with betadine.  A single tooth tenaculum was used to grasp the anterior cervix.  ECC obtained with curette.  Silver nitrate used to prevent bleeding.  The specimen was submitted to pathology for further evaluation.  The tenaculum was removed, and the site remained hemostatic.  The speculum was removed.  The patient tolerated the procedure well. KENNEDY Petit-BC

## 2020-03-09 ENCOUNTER — OFFICE VISIT (OUTPATIENT)
Dept: UROGYNECOLOGY | Facility: CLINIC | Age: 75
DRG: 390 | End: 2020-03-09
Payer: MEDICARE

## 2020-03-09 ENCOUNTER — TELEPHONE (OUTPATIENT)
Dept: GASTROENTEROLOGY | Facility: CLINIC | Age: 75
End: 2020-03-09

## 2020-03-09 VITALS
BODY MASS INDEX: 24.2 KG/M2 | SYSTOLIC BLOOD PRESSURE: 110 MMHG | WEIGHT: 141.75 LBS | DIASTOLIC BLOOD PRESSURE: 60 MMHG | HEIGHT: 64 IN

## 2020-03-09 DIAGNOSIS — N95.2 VAGINAL ATROPHY: ICD-10-CM

## 2020-03-09 DIAGNOSIS — N81.11 MIDLINE CYSTOCELE: ICD-10-CM

## 2020-03-09 DIAGNOSIS — N81.6 RECTOCELE: ICD-10-CM

## 2020-03-09 DIAGNOSIS — D50.0 IRON DEFICIENCY ANEMIA DUE TO CHRONIC BLOOD LOSS: Primary | ICD-10-CM

## 2020-03-09 DIAGNOSIS — N39.46 MIXED STRESS AND URGE URINARY INCONTINENCE: Primary | ICD-10-CM

## 2020-03-09 PROCEDURE — 99999 PR PBB SHADOW E&M-EST. PATIENT-LVL V: CPT | Mod: PBBFAC,,, | Performed by: NURSE PRACTITIONER

## 2020-03-09 PROCEDURE — 99215 OFFICE O/P EST HI 40 MIN: CPT | Mod: PBBFAC | Performed by: NURSE PRACTITIONER

## 2020-03-09 PROCEDURE — 99213 PR OFFICE/OUTPT VISIT, EST, LEVL III, 20-29 MIN: ICD-10-PCS | Mod: S$PBB,,, | Performed by: NURSE PRACTITIONER

## 2020-03-09 PROCEDURE — 99213 OFFICE O/P EST LOW 20 MIN: CPT | Mod: S$PBB,,, | Performed by: NURSE PRACTITIONER

## 2020-03-09 PROCEDURE — 99999 PR PBB SHADOW E&M-EST. PATIENT-LVL V: ICD-10-PCS | Mod: PBBFAC,,, | Performed by: NURSE PRACTITIONER

## 2020-03-09 RX ORDER — SODIUM CHLORIDE 0.9 % (FLUSH) 0.9 %
10 SYRINGE (ML) INJECTION
Status: CANCELLED | OUTPATIENT
Start: 2020-03-11

## 2020-03-09 RX ORDER — HEPARIN 100 UNIT/ML
500 SYRINGE INTRAVENOUS
Status: CANCELLED | OUTPATIENT
Start: 2020-03-11

## 2020-03-09 NOTE — PATIENT INSTRUCTIONS
1)  Mixed urinary incontinence, urge > stress:    --control diabetes  --Empty bladder every 3 hours.  Empty well: wait a minute, lean forward on toilet.    --Avoid dietary irritants (see sheet).  Keep diary x 3-5 days to determine your irritants.  --consider PT in future   --URGE: consider medication (Mirabegron due to SBO).  Takes 2-4 weeks to see if will have effect.  For dry mouth: get sour, sugar free lozenge or gum.    --STRESS:  Pessary vs. Sling.   --#2 ring with support pessary     2)  Vaginal atrophy (dryness):  Use 1 gm vaginal estrogen cream nightly x 1 week.    --continue osphena  --may also reduce bladder infection rate     3)  post menopausal bleeding  --ECC results will take 2 weeks    4)  Incomplete bladder emptying:  --resolved with pessary     5)  Frequent UTIs (bladder infections):  --control diabetes  --work on improving bladder emptying  --continue Keflex 250 mg daily  --If you feel like you have a UTI, please call our office so that we can place an order for you to drop off a urine specimen at the closest [facility] lab (we will arrange).                --We will call in antibiotics for you to start right after you drop off specimen.                --In this way, we can determine:                           1)  Do you have a UTI?                            2) If you have a UTI, is it sensitive to the antibiotics we prescribed?   --follow UTI prevention tips (see attached)  --control bowel movements/fecal cross-contamination  --treat vaginal atrophy (dryness)  --empty bladder before and after intercourse  --consider need for further evaluation (pelvic imaging and cystoscopy) if issue persists; 618 CT A/P  normal     6)  Constipation intermittent with loose stool:  Controlling constipation may help bladder urgency/leakage and fiber may better control cholesterol and blood glucose.  Increas daily fiber.  Increase fiber by 1 tablet every 3-5 days until stool is easy to pass. Take fiber tablets with  large glass of water.  When find place where stool is easy to pass and more well-formed, less loose/accidents,  stop and continue at that dose.   Do not exceed 6 tablets/day.    --May also use over the counter stool softener 1-2 x/day.  --continue use miralax daily--see if taking 1/2 dose is sufficient     7)  RTC for pc

## 2020-03-09 NOTE — TELEPHONE ENCOUNTER
----- Message from Debo Lanza sent at 3/9/2020  4:08 PM CDT -----  Contact: pt [phone]       ----- Message -----  From: Johanny Majano  Sent: 3/9/2020   3:12 PM CDT  To: Li HAGER Staff    Pt is calling to speak with Mery regarding camera appt.Please call

## 2020-03-09 NOTE — TELEPHONE ENCOUNTER
"----- Message from Claudia Robison MA sent at 3/6/2020  3:27 PM CST -----  Contact: [phone]   Please advise. I didn't want to schedule if there were other things that needed to be discussed.    ----- Message -----  From: Valerie Powell MA  Sent: 3/6/2020   2:38 PM CST  To: Alpesh Masters Staff    [phone]   Returning a call to Laura re: setting up a  "camera test "     "

## 2020-03-09 NOTE — TELEPHONE ENCOUNTER
Spoke with patient.  Karen Taylor sent a message to  to see if he will put in an order for the VCE and consent patient.  A request came in from [facility] for patient to have VCE.    has seen her in the past.

## 2020-03-09 NOTE — PROGRESS NOTES
Urogyn follow up  03/09/2020  .  [facility] [id number]   [address] [id number]    Anayeli Hardik Judd  [id number]  1945      Anayeli Judd is a 75 y.o.  here for a urogyn follow up for vaginal bleeding-- ecc done at last visit.  Here for pessary insertion    Last HPI from  02/24/2020  1)  Mixed urinary incontinence, urge > stress:    --initially improved with pessary.    --discharge has resolved.  2)  Vaginal atrophy (dryness):    --using estrogen cream  --using osphena  3)  Incomplete bladder emptying:  --can't tell if pessary helping; does have moderate 2nd void volumes by straining.  4)  Frequent UTIs (bladder infections):  --control diabetes  --work on improving bladder emptying  --has not been taking 250 mg maintenance dose  5)  Constipation intermittent with loose stool:  --better with 4-5 tsps/day  --not needing stool softener  --continue use miralax daily--see if taking 1/2 dose is sufficient  6)post menopausal bleeding  --had large amount of blood x 1 day last week.              Changes from last visit:  1)  Mixed urinary incontinence, urge > stress:    --initially improved with pessary.    --discharge has resolved.     2)  Vaginal atrophy (dryness):    --using estrogen cream  --using osphena      3)  Incomplete bladder emptying:  --can't tell if pessary helping; does have moderate 2nd void volumes by straining.       4)  Frequent UTIs (bladder infections):  --control diabetes  --work on improving bladder emptying  --has not been taking 250 mg maintenance dose     5)  Constipation intermittent with loose stool:  --better with 4-5 tsps/day  --not needing stool softener  --continue use miralax daily--see if taking 1/2 dose is sufficient     6)post menopausal bleeding  --ecc done last visit.          11/26/2018  Cysto with Togami  FINDINGS:  Dome, anterior, posterior, lateral walls and bladder base free of urothelial abnormalities. Right and left ureteral orifices  in the normal postion and configuration, both effluxed clear urine.  Bladder neck and urethra were normal    11/15/2018  Retroperitoneal ultrasound  Right kidney: Normal in size measuring 10 cm. Increased cortical echogenicity.  No loss of corticomedullary distinction. Resistive index measures 0.70.  No mass. No renal stone. Mild hydronephrosis.  Left kidney: Normal in size measuring 10.1 cm. Increased cortical echogenicity.  No loss of corticomedullary distinction. Resistive index measures 0.74.  No mass. No renal stone. Mildly prominent collecting system, possibly representing mild hydronephrosis.  Urinary bladder is distended but otherwise unremarkable.  Both ureteral jets are seen.  Impression  Mild bilateral hydronephrosis, right side greater than left, similar to recent CT.  Distended urinary bladder with preservation of bilateral ureteral jets.    Past Medical History:   Diagnosis Date    Abdominal pain     Allergic rhinitis     Arthritis     Blood transfusion     during delivery and     Bowel obstruction     Cervical radiculopathy     followed by dr cloud    Colon cancer     transverse colon; resected; Stage IIA (pT3 pN0 MX)    Diarrhea     Family history of breast cancer     Family history of colon cancer     Fatty liver     GERD (gastroesophageal reflux disease)     History of shingles     Hyperlipidemia     Hypothyroidism     Irritable bowel syndrome     Microscopic colitis     treated     Raynaud phenomenon     Raynaud's disease     Type 2 diabetes mellitus        Past Surgical History:   Procedure Laterality Date    APPENDECTOMY      BACK SURGERY      CARPAL TUNNEL RELEASE      bilateral      SECTION      CHOLECYSTECTOMY  1965    COLECTOMY  2011    Transverse colon resection by Dr. Aguirre    COLONOSCOPY N/A 2017    Procedure: COLONOSCOPY;  Surgeon: Manjit Alvarez MD;  Location: [address]);  Service: Endoscopy;  Laterality: N/A;     COLONOSCOPY N/A 6/26/2019    Procedure: COLONOSCOPY;  Surgeon: Ramiro Jefferson MD;  Location: [facility] (4TH FLR);  Service: Endoscopy;  Laterality: N/A;  PM prep    ENDOSCOPIC ULTRASOUND OF UPPER GASTROINTESTINAL TRACT N/A 12/12/2018    Procedure: ULTRASOUND, UPPER GI TRACT, ENDOSCOPIC;  Surgeon: Jose Hess MD;  Location: [facility] ENDO;  Service: Endoscopy;  Laterality: N/A;    ENDOSCOPIC ULTRASOUND OF UPPER GASTROINTESTINAL TRACT N/A 1/23/2019    Procedure: ULTRASOUND, UPPER GI TRACT, ENDOSCOPIC;  Surgeon: Jose Hess MD;  Location: [facility] (2ND FLR);  Service: Endoscopy;  Laterality: N/A;    ESOPHAGOGASTRODUODENOSCOPY N/A 11/16/2018    Procedure: EGD (ESOPHAGOGASTRODUODENOSCOPY);  Surgeon: Angelo Reynolds MD;  Location: [facility] (2ND FLR);  Service: Endoscopy;  Laterality: N/A;    ESOPHAGOGASTRODUODENOSCOPY N/A 6/26/2019    Procedure: EGD (ESOPHAGOGASTRODUODENOSCOPY);  Surgeon: Ramiro Jefferson MD;  Location: [facility] (4TH FLR);  Service: Endoscopy;  Laterality: N/A;    EYE SURGERY      Cataract Removal    HYSTERECTOMY      posterolateral fusion with autograft bone and Eun mineralized bone matrix  2/1/13    at [facility] for lumbar spine stenosis    TOE SURGERY      TONSILLECTOMY      TRIGGER FINGER RELEASE         Current Outpatient Medications   Medication Sig    acetaZOLAMIDE (DIAMOX) 250 MG tablet Take 1 tablet (250 mg total) by mouth 3 (three) times daily.    atorvastatin (LIPITOR) 40 MG tablet TAKE 1 TABLET(40 MG) BY MOUTH EVERY DAY    betamethasone dipropionate (DIPROLENE) 0.05 % ointment Apply every Am x 2 weeks then twice weekly    blood sugar diagnostic Strp Dispense madie contour strips; test blood sugar once daily    blood-glucose meter (CONTOUR METER) kit Please dispense Madie Contour meter and supplies Use as instructed Test Blood sugar once daily    cephALEXin (KEFLEX) 250 MG capsule Take 1 capsule (250 mg total) by mouth once daily.    diphenhydrAMINE (BENADRYL)  25 mg capsule Take 1 each (25 mg total) by mouth nightly as needed for Itching, Allergies or Insomnia.    docusate sodium (COLACE) 100 MG capsule Take 1 capsule (100 mg total) by mouth 2 (two) times daily.    DULoxetine (CYMBALTA) 30 MG capsule     fluticasone (FLONASE) 50 mcg/actuation nasal spray 2 sprays by Each Nare route daily as needed.     FREESTYLE EFREN 10 DAY SENSOR Kit Check blood glucose every day. Please dispense 6 mos supply.    FREESTYLE EFREN READER Misc TEST as directed    gabapentin (NEURONTIN) 600 MG tablet Take 1,200 mg by mouth 2 (two) times daily.     hydrocortisone 2.5 % cream     Lactobacillus rhamnosus GG (CULTURELLE) 10 billion cell capsule Take 1 capsule by mouth once daily.    lancets (ONE TOUCH ULTRASOFT LANCETS) Misc Test blood sugar once daily    levothyroxine (SYNTHROID) 100 MCG tablet TAKE 1 TABLET BY MOUTH EVERY MORNING    lidocaine (LIDODERM) 5 %     linaGLIPtin (TRADJENTA) 5 mg Tab tablet Take 1 tablet (5 mg total) by mouth once daily.    magnesium 30 mg Tab Take 500 capsules by mouth. Patient's taking magnesium 500mg QD    metFORMIN (GLUCOPHAGE-XR) 750 MG 24 hr tablet TAKE 1 TABLET(750 MG) BY MOUTH TWICE DAILY WITH MEALS    methocarbamol (ROBAXIN) 500 MG Tab Take 500 mg by mouth every evening.    mirabegron (MYRBETRIQ) 50 mg Tb24 Take 1 tablet (50 mg total) by mouth once daily.    mupirocin (BACTROBAN) 2 % ointment Apply topically 3 (three) times daily.    nitrofurantoin, macrocrystal-monohydrate, (MACROBID) 100 MG capsule Take 1 capsule (100 mg total) by mouth 2 (two) times daily.    ondansetron (ZOFRAN) 4 MG tablet Take 1 tablet (4 mg total) by mouth every 8 (eight) hours as needed for Nausea.    ondansetron (ZOFRAN-ODT) 8 MG TbDL     ospemifene (OSPHENA) 60 mg Tab Take 60 mg by mouth once daily.    pantoprazole (PROTONIX) 40 MG tablet Take 1 tablet (40 mg total) by mouth once daily.    polyethylene glycol (GLYCOLAX) 17 gram/dose powder Take 17 g by mouth  "once daily.    PREMARIN vaginal cream PLACE 0.5 GRAM VAGINALLY EVERY EVENING    traMADol (ULTRAM) 50 mg tablet     triamcinolone acetonide 0.1% (KENALOG) 0.1 % paste apply to affected area with A Q-TIP three times a day    vitamin D 1000 units Tab Take 185 mg by mouth once daily. D3    azelastine (ASTELIN) 137 mcg nasal spray 1 spray (137 mcg total) by Nasal route 2 (two) times daily.    cholestyramine-aspartame (QUESTRAN/PREVALITE LIGHT) 4 gram Powd Take 4 g by mouth once daily.     No current facility-administered medications for this visit.        Well woman:  Pap test: post ANGEL.  History of abnormal paps: Yes--had ANGEL.  History of STIs:  No  Mammogram: Date of last: 05/2019 normal  Colonoscopy: Date of last: 2017.  Result:  Benign polyps.  Repeat due:  2019.    DEXA:  Date of last: 05/17/2019 normal     ROS:  As per HPI.      Exam  /60 (BP Location: Right arm, Patient Position: Sitting, BP Method: Medium (Manual))   Ht 5' 4" (1.626 m)   Wt 64.3 kg (141 lb 12.1 oz)   LMP  (LMP Unknown)   BMI 24.33 kg/m²   General: alert and oriented, no acute distress  Scalp: incision in occipital area well healed-- 6 staples intact.   Respiratory: normal respiratory effort  Abd: soft, non-tender, non-distended    Pelvic  Ext. Genitalia:external genitalia no lesions. Atrophic. Normal bartholin's and skeens glands  Vagina: + atrophy.  No lesions noted.  No discharge noted.   Cervix: absent ? Dimple where cervix was-- possible supracervical hyst  Uterus:  absent   Urethra: no masses or tenderness  Urethral meatus: no lesions, caruncle or prolapse.      #2 ring with support pessary inserted.      Impression  1. Mixed stress and urge urinary incontinence     2. Midline cystocele     3. Rectocele     4. Vaginal atrophy       We reviewed the above issues and discussed options for short-term versus long-term management of her problems.   Plan:     1)  Mixed urinary incontinence, urge > stress:    --control " diabetes  --Empty bladder every 3 hours.  Empty well: wait a minute, lean forward on toilet.    --Avoid dietary irritants (see sheet).  Keep diary x 3-5 days to determine your irritants.  --consider PT in future   --URGE: consider medication (Mirabegron due to SBO).  Takes 2-4 weeks to see if will have effect.  For dry mouth: get sour, sugar free lozenge or gum.    --STRESS:  Pessary vs. Sling.   --#2 ring with support pessary     2)  Vaginal atrophy (dryness):  Use 1 gm vaginal estrogen cream nightly x 1 week.    --continue osphena  --may also reduce bladder infection rate     3)  post menopausal bleeding  --ECC results will take 2 weeks    4)  Incomplete bladder emptying:  --resolved with pessary     5)  Frequent UTIs (bladder infections):  --control diabetes  --work on improving bladder emptying  --continue Keflex 250 mg daily  --If you feel like you have a UTI, please call our office so that we can place an order for you to drop off a urine specimen at the closest [facility] lab (we will arrange).                --We will call in antibiotics for you to start right after you drop off specimen.                --In this way, we can determine:                           1)  Do you have a UTI?                            2) If you have a UTI, is it sensitive to the antibiotics we prescribed?   --follow UTI prevention tips (see attached)  --control bowel movements/fecal cross-contamination  --treat vaginal atrophy (dryness)  --empty bladder before and after intercourse  --consider need for further evaluation (pelvic imaging and cystoscopy) if issue persists; 618 CT A/P  normal     6)  Constipation intermittent with loose stool:  Controlling constipation may help bladder urgency/leakage and fiber may better control cholesterol and blood glucose.  Increas daily fiber.  Increase fiber by 1 tablet every 3-5 days until stool is easy to pass. Take fiber tablets with large glass of water.  When find place where stool is easy to  pass and more well-formed, less loose/accidents,  stop and continue at that dose.   Do not exceed 6 tablets/day.    --May also use over the counter stool softener 1-2 x/day.  --continue use miralax daily--see if taking 1/2 dose is sufficient     7)  RTC for pc      30 minutes were spent in face to face time with this patient  90 % of this time was spent in counseling and/or coordination of care    KENNEDY Petit-BC [facility]  Division of Female Pelvic Medicine and Reconstructive Surgery  Department of Obstetrics & Gynecology

## 2020-03-09 NOTE — TELEPHONE ENCOUNTER
"----- Message from Claudia Robison MA sent at 3/9/2020  8:28 AM CDT -----  Contact: [phone]       ----- Message -----  From: Valerie Powell MA  Sent: 3/6/2020   2:38 PM CDT  To: Alpesh Masters Staff    [phone]   Returning a call to Laura re: setting up a  "camera test "   "

## 2020-03-10 ENCOUNTER — TELEPHONE (OUTPATIENT)
Dept: GASTROENTEROLOGY | Facility: CLINIC | Age: 75
End: 2020-03-10

## 2020-03-10 NOTE — TELEPHONE ENCOUNTER
----- Message from Guerrero Betts MD sent at 3/10/2020  2:13 PM CDT -----  Contact: pt [phone]   Hi,    Thank you for the message. I reviewed the case with Dr. Alvarez. Given her complex surgical history with prior bowel resections and small bowel obstructions we would not recommend a VCE in this patient due to the risk for capsule retention. She has had many CTAP over the past few years including 6/1yr therefore our suspicion of a small bowel malignancy is low. Enteroscopy given her surgical history would also be very difficult. Given her counts are stable/improving would recommend continuing supportive care at this time in lieu of further small bowel evaluation at this time.    Thanks,  Nahid      ----- Message -----  From: Debra Rausch RN  Sent: 3/10/2020  10:37 AM CDT  To: Guerrero Betts MD    [facility] placed an order for VCE for this patient. Does this patient need an appointment with one of our providers to discuss consent and prep?  ----- Message -----  From: Richelle Saenz MA  Sent: 3/10/2020  10:17 AM CDT  To: SADIQ Calloway?  He has seen  in the past.  ----- Message -----  From: Debra Rausch RN  Sent: 3/10/2020   9:25 AM CDT  To: Richelle Saenz MA    [facility] put in order for VCE, who schedules this?  ----- Message -----  From: Debra Rausch RN  Sent: 3/9/2020   5:14 PM CDT  To: Debra Rausch RN        ----- Message -----  From: Debo Lanza  Sent: 3/9/2020   4:08 PM CDT  To: Richelle Saenz MA, Ellis Daniel Staff        ----- Message -----  From: Johanny Majano  Sent: 3/9/2020   3:12 PM CDT  To: Li HAGER Staff    Pt is calling to speak with Mery regarding camera appt.Please call

## 2020-03-10 NOTE — TELEPHONE ENCOUNTER
Patient notified that  does not recommend VCE for her due to high risk for retention based on history. Patient verbalized understanding. Dr Betts sent message to [facility] [place]

## 2020-03-11 ENCOUNTER — HOSPITAL ENCOUNTER (INPATIENT)
Facility: HOSPITAL | Age: 75
LOS: 2 days | Discharge: HOME | DRG: 390 | End: 2020-03-13
Attending: EMERGENCY MEDICINE | Admitting: SURGERY
Payer: MEDICARE

## 2020-03-11 DIAGNOSIS — Z46.59 ENCOUNTER FOR NASOGASTRIC (NG) TUBE PLACEMENT: ICD-10-CM

## 2020-03-11 DIAGNOSIS — K56.609 SBO (SMALL BOWEL OBSTRUCTION): Primary | ICD-10-CM

## 2020-03-11 LAB
ALBUMIN SERPL BCP-MCNC: 4.2 G/DL (ref 3.5–5.2)
ALP SERPL-CCNC: 55 U/L (ref 55–135)
ALT SERPL W/O P-5'-P-CCNC: 36 U/L (ref 10–44)
ANION GAP SERPL CALC-SCNC: 14 MMOL/L (ref 8–16)
AST SERPL-CCNC: 46 U/L (ref 10–40)
BACTERIA #/AREA URNS AUTO: ABNORMAL /HPF
BASOPHILS # BLD AUTO: 0.06 K/UL (ref 0–0.2)
BASOPHILS NFR BLD: 0.6 % (ref 0–1.9)
BILIRUB SERPL-MCNC: 1 MG/DL (ref 0.1–1)
BILIRUB UR QL STRIP: NEGATIVE
BUN SERPL-MCNC: 10 MG/DL (ref 8–23)
BUN SERPL-MCNC: 5 MG/DL (ref 6–30)
CALCIUM SERPL-MCNC: 9.4 MG/DL (ref 8.7–10.5)
CHLORIDE SERPL-SCNC: 115 MMOL/L (ref 95–110)
CHLORIDE SERPL-SCNC: 99 MMOL/L (ref 95–110)
CLARITY UR REFRACT.AUTO: CLEAR
CO2 SERPL-SCNC: 20 MMOL/L (ref 23–29)
COLOR UR AUTO: YELLOW
CREAT SERPL-MCNC: 0.2 MG/DL (ref 0.5–1.4)
CREAT SERPL-MCNC: 0.9 MG/DL (ref 0.5–1.4)
DIFFERENTIAL METHOD: ABNORMAL
EOSINOPHIL # BLD AUTO: 0.2 K/UL (ref 0–0.5)
EOSINOPHIL NFR BLD: 2.4 % (ref 0–8)
ERYTHROCYTE [DISTWIDTH] IN BLOOD BY AUTOMATED COUNT: 14.5 % (ref 11.5–14.5)
EST. GFR  (AFRICAN AMERICAN): >60 ML/MIN/1.73 M^2
EST. GFR  (NON AFRICAN AMERICAN): >60 ML/MIN/1.73 M^2
GLUCOSE SERPL-MCNC: 125 MG/DL (ref 70–110)
GLUCOSE SERPL-MCNC: 81 MG/DL (ref 70–110)
GLUCOSE UR QL STRIP: NEGATIVE
HCT VFR BLD AUTO: 42 % (ref 37–48.5)
HCT VFR BLD CALC: 21 %PCV (ref 36–54)
HGB BLD-MCNC: 13.5 G/DL (ref 12–16)
HGB UR QL STRIP: NEGATIVE
HYALINE CASTS UR QL AUTO: 1 /LPF
IMM GRANULOCYTES # BLD AUTO: 0.05 K/UL (ref 0–0.04)
IMM GRANULOCYTES NFR BLD AUTO: 0.5 % (ref 0–0.5)
KETONES UR QL STRIP: NEGATIVE
LACTATE SERPL-SCNC: 1.6 MMOL/L (ref 0.5–2.2)
LACTATE SERPL-SCNC: 2.3 MMOL/L (ref 0.5–2.2)
LEUKOCYTE ESTERASE UR QL STRIP: ABNORMAL
LIPASE SERPL-CCNC: 43 U/L (ref 4–60)
LYMPHOCYTES # BLD AUTO: 2.2 K/UL (ref 1–4.8)
LYMPHOCYTES NFR BLD: 21.8 % (ref 18–48)
MCH RBC QN AUTO: 30.6 PG (ref 27–31)
MCHC RBC AUTO-ENTMCNC: 32.1 G/DL (ref 32–36)
MCV RBC AUTO: 95 FL (ref 82–98)
MICROSCOPIC COMMENT: ABNORMAL
MONOCYTES # BLD AUTO: 1 K/UL (ref 0.3–1)
MONOCYTES NFR BLD: 10.1 % (ref 4–15)
NEUTROPHILS # BLD AUTO: 6.5 K/UL (ref 1.8–7.7)
NEUTROPHILS NFR BLD: 64.6 % (ref 38–73)
NITRITE UR QL STRIP: NEGATIVE
NON-SQ EPI CELLS #/AREA URNS AUTO: <1 /HPF
NRBC BLD-RTO: 0 /100 WBC
PH UR STRIP: 5 [PH] (ref 5–8)
PLATELET # BLD AUTO: 98 K/UL (ref 150–350)
PMV BLD AUTO: 10.8 FL (ref 9.2–12.9)
POC IONIZED CALCIUM: 0.75 MMOL/L (ref 1.06–1.42)
POC TCO2 (MEASURED): 14 MMOL/L (ref 23–29)
POCT GLUCOSE: 123 MG/DL (ref 70–110)
POTASSIUM BLD-SCNC: 2.6 MMOL/L (ref 3.5–5.1)
POTASSIUM SERPL-SCNC: 5.4 MMOL/L (ref 3.5–5.1)
PROT SERPL-MCNC: 7.9 G/DL (ref 6–8.4)
PROT UR QL STRIP: NEGATIVE
RBC # BLD AUTO: 4.41 M/UL (ref 4–5.4)
SAMPLE: ABNORMAL
SODIUM BLD-SCNC: 145 MMOL/L (ref 136–145)
SODIUM SERPL-SCNC: 133 MMOL/L (ref 136–145)
SP GR UR STRIP: 1.01 (ref 1–1.03)
SQUAMOUS #/AREA URNS AUTO: 3 /HPF
URN SPEC COLLECT METH UR: ABNORMAL
WBC # BLD AUTO: 10.11 K/UL (ref 3.9–12.7)
WBC #/AREA URNS AUTO: 17 /HPF (ref 0–5)
YEAST UR QL AUTO: ABNORMAL

## 2020-03-11 PROCEDURE — 83605 ASSAY OF LACTIC ACID: CPT | Mod: 91

## 2020-03-11 PROCEDURE — 80047 BASIC METABLC PNL IONIZED CA: CPT

## 2020-03-11 PROCEDURE — 81001 URINALYSIS AUTO W/SCOPE: CPT

## 2020-03-11 PROCEDURE — 12000002 HC ACUTE/MED SURGE SEMI-PRIVATE ROOM

## 2020-03-11 PROCEDURE — 99285 PR EMERGENCY DEPT VISIT,LEVEL V: ICD-10-PCS | Mod: ,,, | Performed by: EMERGENCY MEDICINE

## 2020-03-11 PROCEDURE — 96374 THER/PROPH/DIAG INJ IV PUSH: CPT

## 2020-03-11 PROCEDURE — 85025 COMPLETE CBC W/AUTO DIFF WBC: CPT

## 2020-03-11 PROCEDURE — 96375 TX/PRO/DX INJ NEW DRUG ADDON: CPT

## 2020-03-11 PROCEDURE — 83605 ASSAY OF LACTIC ACID: CPT

## 2020-03-11 PROCEDURE — 83690 ASSAY OF LIPASE: CPT

## 2020-03-11 PROCEDURE — 99285 EMERGENCY DEPT VISIT HI MDM: CPT | Mod: ,,, | Performed by: EMERGENCY MEDICINE

## 2020-03-11 PROCEDURE — 63600175 PHARM REV CODE 636 W HCPCS: Performed by: EMERGENCY MEDICINE

## 2020-03-11 PROCEDURE — 96361 HYDRATE IV INFUSION ADD-ON: CPT

## 2020-03-11 PROCEDURE — 99285 EMERGENCY DEPT VISIT HI MDM: CPT | Mod: 25

## 2020-03-11 PROCEDURE — 87086 URINE CULTURE/COLONY COUNT: CPT

## 2020-03-11 PROCEDURE — 63600175 PHARM REV CODE 636 W HCPCS: Performed by: STUDENT IN AN ORGANIZED HEALTH CARE EDUCATION/TRAINING PROGRAM

## 2020-03-11 PROCEDURE — 25500020 PHARM REV CODE 255: Performed by: EMERGENCY MEDICINE

## 2020-03-11 PROCEDURE — 99223 1ST HOSP IP/OBS HIGH 75: CPT | Mod: AI,,, | Performed by: SURGERY

## 2020-03-11 PROCEDURE — 99223 PR INITIAL HOSPITAL CARE,LEVL III: ICD-10-PCS | Mod: AI,,, | Performed by: SURGERY

## 2020-03-11 PROCEDURE — 80053 COMPREHEN METABOLIC PANEL: CPT

## 2020-03-11 PROCEDURE — 96376 TX/PRO/DX INJ SAME DRUG ADON: CPT

## 2020-03-11 RX ORDER — ONDANSETRON 2 MG/ML
4 INJECTION INTRAMUSCULAR; INTRAVENOUS EVERY 8 HOURS PRN
Status: DISCONTINUED | OUTPATIENT
Start: 2020-03-11 | End: 2020-03-13 | Stop reason: HOSPADM

## 2020-03-11 RX ORDER — ENOXAPARIN SODIUM 100 MG/ML
40 INJECTION SUBCUTANEOUS EVERY 12 HOURS
Status: DISCONTINUED | OUTPATIENT
Start: 2020-03-11 | End: 2020-03-11

## 2020-03-11 RX ORDER — PANTOPRAZOLE SODIUM 40 MG/10ML
40 INJECTION, POWDER, LYOPHILIZED, FOR SOLUTION INTRAVENOUS DAILY
Status: DISCONTINUED | OUTPATIENT
Start: 2020-03-12 | End: 2020-03-13

## 2020-03-11 RX ORDER — SODIUM CHLORIDE, SODIUM LACTATE, POTASSIUM CHLORIDE, CALCIUM CHLORIDE 600; 310; 30; 20 MG/100ML; MG/100ML; MG/100ML; MG/100ML
INJECTION, SOLUTION INTRAVENOUS CONTINUOUS
Status: DISCONTINUED | OUTPATIENT
Start: 2020-03-11 | End: 2020-03-13

## 2020-03-11 RX ORDER — CEPHALEXIN 250 MG/1
250 CAPSULE ORAL DAILY
Status: DISCONTINUED | OUTPATIENT
Start: 2020-03-12 | End: 2020-03-12

## 2020-03-11 RX ORDER — ACETAZOLAMIDE 250 MG/1
250 TABLET ORAL 3 TIMES DAILY
Status: DISCONTINUED | OUTPATIENT
Start: 2020-03-12 | End: 2020-03-12

## 2020-03-11 RX ORDER — MORPHINE SULFATE 4 MG/ML
4 INJECTION, SOLUTION INTRAMUSCULAR; INTRAVENOUS
Status: COMPLETED | OUTPATIENT
Start: 2020-03-11 | End: 2020-03-11

## 2020-03-11 RX ORDER — ACETAMINOPHEN 325 MG/1
650 TABLET ORAL EVERY 8 HOURS PRN
Status: DISCONTINUED | OUTPATIENT
Start: 2020-03-11 | End: 2020-03-13

## 2020-03-11 RX ORDER — ENOXAPARIN SODIUM 100 MG/ML
40 INJECTION SUBCUTANEOUS
Status: DISCONTINUED | OUTPATIENT
Start: 2020-03-12 | End: 2020-03-13 | Stop reason: HOSPADM

## 2020-03-11 RX ORDER — LEVOTHYROXINE SODIUM 100 UG/1
100 TABLET ORAL EVERY MORNING
Status: DISCONTINUED | OUTPATIENT
Start: 2020-03-12 | End: 2020-03-13 | Stop reason: HOSPADM

## 2020-03-11 RX ORDER — INSULIN ASPART 100 [IU]/ML
1-10 INJECTION, SOLUTION INTRAVENOUS; SUBCUTANEOUS EVERY 6 HOURS PRN
Status: DISCONTINUED | OUTPATIENT
Start: 2020-03-11 | End: 2020-03-13 | Stop reason: HOSPADM

## 2020-03-11 RX ORDER — SODIUM CHLORIDE 0.9 % (FLUSH) 0.9 %
10 SYRINGE (ML) INJECTION
Status: DISCONTINUED | OUTPATIENT
Start: 2020-03-11 | End: 2020-03-13 | Stop reason: HOSPADM

## 2020-03-11 RX ORDER — LIDOCAINE HYDROCHLORIDE 10 MG/ML
1 INJECTION, SOLUTION EPIDURAL; INFILTRATION; INTRACAUDAL; PERINEURAL ONCE
Status: DISCONTINUED | OUTPATIENT
Start: 2020-03-11 | End: 2020-03-12

## 2020-03-11 RX ORDER — PROMETHAZINE HYDROCHLORIDE 25 MG/1
25 TABLET ORAL EVERY 6 HOURS PRN
Status: DISCONTINUED | OUTPATIENT
Start: 2020-03-11 | End: 2020-03-13 | Stop reason: HOSPADM

## 2020-03-11 RX ORDER — GLUCAGON 1 MG
1 KIT INJECTION
Status: DISCONTINUED | OUTPATIENT
Start: 2020-03-11 | End: 2020-03-13 | Stop reason: HOSPADM

## 2020-03-11 RX ORDER — ONDANSETRON 2 MG/ML
4 INJECTION INTRAMUSCULAR; INTRAVENOUS
Status: COMPLETED | OUTPATIENT
Start: 2020-03-11 | End: 2020-03-11

## 2020-03-11 RX ADMIN — IOHEXOL 75 ML: 350 INJECTION, SOLUTION INTRAVENOUS at 08:03

## 2020-03-11 RX ADMIN — ONDANSETRON 4 MG: 2 INJECTION INTRAMUSCULAR; INTRAVENOUS at 04:03

## 2020-03-11 RX ADMIN — SODIUM CHLORIDE, SODIUM LACTATE, POTASSIUM CHLORIDE, AND CALCIUM CHLORIDE: .6; .31; .03; .02 INJECTION, SOLUTION INTRAVENOUS at 09:03

## 2020-03-11 RX ADMIN — SODIUM CHLORIDE 1000 ML: 0.9 INJECTION, SOLUTION INTRAVENOUS at 04:03

## 2020-03-11 RX ADMIN — MORPHINE SULFATE 4 MG: 4 INJECTION INTRAVENOUS at 04:03

## 2020-03-11 RX ADMIN — MORPHINE SULFATE 4 MG: 4 INJECTION INTRAVENOUS at 08:03

## 2020-03-11 RX ADMIN — IOHEXOL 15 ML: 350 INJECTION, SOLUTION INTRAVENOUS at 08:03

## 2020-03-11 NOTE — ED TRIAGE NOTES
Anayeli Judd, a 75 y.o. female presents to the ED w/ complaint of generalized abdominal pain which is chronic but has gotten worse today. The abdominal pain is described as a sharp pain, which sometimes comes and goes. Nothing makes the pain better.    Patient also verbalizes that she has nausea and shortness of breath with the abdominal pain    Patient being followed by GI HX: multiple obstructions and GI surgeries     Patient denies chest pain, fever and low back pain       Triage note:  Chief Complaint   Patient presents with    Abdominal Pain     Pt c/o abdominal pain.  Reports hx of blockages.       Review of patient's allergies indicates:   Allergen Reactions    Dilaudid [hydromorphone (bulk)] Other (See Comments)     Oversedating, head burning. Pt prefers to avoid.       Codeine Nausea And Vomiting     Other reaction(s): Itching    Percocet  [oxycodone-acetaminophen]      Other reaction(s): Itching    Sulfa (sulfonamide antibiotics)      Other reaction(s): Nausea  Other reaction(s): Itching     Past Medical History:   Diagnosis Date    Abdominal pain     Allergic rhinitis     Arthritis     Blood transfusion     during delivery and     Bowel obstruction     Cervical radiculopathy     followed by dr cloud    Colon cancer     transverse colon; resected; Stage IIA (pT3 pN0 MX)    Diarrhea     Family history of breast cancer     Family history of colon cancer     Fatty liver     GERD (gastroesophageal reflux disease)     History of shingles     Hyperlipidemia     Hypothyroidism     Irritable bowel syndrome     Microscopic colitis     treated     Raynaud phenomenon     Raynaud's disease     Type 2 diabetes mellitus

## 2020-03-11 NOTE — ED PROVIDER NOTES
Encounter Date: 3/11/2020       History     Chief Complaint   Patient presents with    Abdominal Pain     Pt c/o abdominal pain.  Reports hx of blockages.       76 yo W with pmhx GERD, IBS, DM, iron deficiency anemia, SBOs with multiple bowel resections, mixed urinary incontinence with pessary use, currently undergoing outpatient evaluation for vaginal bleeding status post biopsy presents with a chief complaint of abdominal pain.  Pain is located in suprapubic region.  Pain radiates to the epigastrium and left upper quadrant.  Pain began this morning.  It is constant and progressively worsening.  It is severe at this time.  Patient reports pain is similar in quality to previous obstructions.  Patient last passed gas at 6:00 a.m., approximately 11 hrs prior to my evaluation.  She has been suffering from nausea since this afternoon but no vomiting.  She has been suffering from loose, watery stool for the past few weeks.  No dysuria, urinary frequency.  According to family, most recent surgery was approximately 3 years prior and was complicated with a 5 week hospitalization.        Review of patient's allergies indicates:   Allergen Reactions    Dilaudid [hydromorphone (bulk)] Other (See Comments)     Oversedating, head burning. Pt prefers to avoid.       Codeine Nausea And Vomiting     Other reaction(s): Itching    Percocet  [oxycodone-acetaminophen]      Other reaction(s): Itching    Sulfa (sulfonamide antibiotics)      Other reaction(s): Nausea  Other reaction(s): Itching     Past Medical History:   Diagnosis Date    Abdominal pain     Allergic rhinitis     Arthritis     Blood transfusion     during delivery and     Bowel obstruction     Cervical radiculopathy     followed by dr cloud    Colon cancer     transverse colon; resected; Stage IIA (pT3 pN0 MX)    Diarrhea     Family history of breast cancer     Family history of colon cancer     Fatty liver     GERD (gastroesophageal reflux  disease)     History of shingles     Hyperlipidemia     Hypothyroidism     Irritable bowel syndrome     Microscopic colitis     treated 2013    Raynaud phenomenon     Raynaud's disease     Type 2 diabetes mellitus      Past Surgical History:   Procedure Laterality Date    APPENDECTOMY      BACK SURGERY      CARPAL TUNNEL RELEASE      bilateral      SECTION      CHOLECYSTECTOMY  1965    COLECTOMY  2011    Transverse colon resection by Dr. Aguirre    COLONOSCOPY N/A 2017    Procedure: COLONOSCOPY;  Surgeon: Manjit Alvarez MD;  Location: [facility] (4TH FLR);  Service: Endoscopy;  Laterality: N/A;    COLONOSCOPY N/A 2019    Procedure: COLONOSCOPY;  Surgeon: Ramiro Jefferson MD;  Location: [facility] (4TH FLR);  Service: Endoscopy;  Laterality: N/A;  PM prep    ENDOSCOPIC ULTRASOUND OF UPPER GASTROINTESTINAL TRACT N/A 2018    Procedure: ULTRASOUND, UPPER GI TRACT, ENDOSCOPIC;  Surgeon: Jose Hess MD;  Location: [facility];  Service: Endoscopy;  Laterality: N/A;    ENDOSCOPIC ULTRASOUND OF UPPER GASTROINTESTINAL TRACT N/A 2019    Procedure: ULTRASOUND, UPPER GI TRACT, ENDOSCOPIC;  Surgeon: Jose Hess MD;  Location: [facility] (2ND FLR);  Service: Endoscopy;  Laterality: N/A;    ESOPHAGOGASTRODUODENOSCOPY N/A 2018    Procedure: EGD (ESOPHAGOGASTRODUODENOSCOPY);  Surgeon: Angelo Reynolds MD;  Location: [facility] (2ND FLR);  Service: Endoscopy;  Laterality: N/A;    ESOPHAGOGASTRODUODENOSCOPY N/A 2019    Procedure: EGD (ESOPHAGOGASTRODUODENOSCOPY);  Surgeon: Ramiro Jefferson MD;  Location: [facility] (4TH FLR);  Service: Endoscopy;  Laterality: N/A;    EYE SURGERY      Cataract Removal    HYSTERECTOMY      posterolateral fusion with autograft bone and [place] mineralized bone matrix  13    [facility] for lumbar spine stenosis    TOE SURGERY      TONSILLECTOMY      TRIGGER FINGER RELEASE       Family History   Problem Relation Age of Onset     Heart disease Father 50        Mi age 50    Colon cancer Father     Bladder Cancer Mother         non smoker    Cataracts Mother     Glaucoma Mother     Heart disease Mother     Hyperlipidemia Mother     Kidney disease Mother     Breast cancer Sister 79    Arthritis Daughter     Asthma Daughter     Depression Daughter     Hypertension Daughter     Stroke Daughter 40    Arthritis Brother     Colon cancer Brother 70    Alcohol abuse Brother     Parkinsonism Brother     Alcohol abuse Brother     Depression Daughter     Celiac disease Neg Hx     Cirrhosis Neg Hx     Colon polyps Neg Hx     Crohn's disease Neg Hx     Cystic fibrosis Neg Hx     Esophageal cancer Neg Hx     Hemochromatosis Neg Hx     Inflammatory bowel disease Neg Hx     Irritable bowel syndrome Neg Hx     Liver cancer Neg Hx     Liver disease Neg Hx     Rectal cancer Neg Hx     Stomach cancer Neg Hx     Ulcerative colitis Neg Hx     Eliazar's disease Neg Hx     Amblyopia Neg Hx     Blindness Neg Hx     Macular degeneration Neg Hx     Retinal detachment Neg Hx     Strabismus Neg Hx     Melanoma Neg Hx      Social History     Tobacco Use    Smoking status: Never Smoker    Smokeless tobacco: Never Used   Substance Use Topics    Alcohol use: Yes     Comment: socially, hardly ever    Drug use: No     Review of Systems   Constitutional: Negative for fever.   HENT: Negative for congestion.    Eyes: Negative for discharge.   Respiratory: Negative for shortness of breath.    Cardiovascular: Negative for chest pain.   Gastrointestinal: Positive for abdominal distention, abdominal pain, diarrhea and nausea. Negative for anal bleeding, blood in stool, constipation and vomiting.   Endocrine: Negative for polyuria.   Genitourinary: Negative for dysuria.   Musculoskeletal: Negative for back pain.   Skin: Negative for wound.   Allergic/Immunologic: Negative for immunocompromised state.   Neurological: Negative for headaches.    Hematological: Does not bruise/bleed easily.   Psychiatric/Behavioral: Negative for confusion.       Physical Exam     Initial Vitals [03/11/20 1458]   BP Pulse Resp Temp SpO2   (!) 106/56 83 18 98 °F (36.7 °C) 96 %      MAP       --         Physical Exam    Nursing note and vitals reviewed.  Constitutional: She appears well-developed and well-nourished. She is not diaphoretic. She appears distressed (Mild, secondary to pain).   HENT:   Head: Normocephalic and atraumatic.   Dry mucous membranes   Eyes: EOM are normal. Right eye exhibits no discharge. Left eye exhibits no discharge. No scleral icterus.   Neck: Normal range of motion. Neck supple. No JVD present.   Cardiovascular: Normal rate, regular rhythm, normal heart sounds and intact distal pulses. Exam reveals no gallop and no friction rub.    No murmur heard.  Pulmonary/Chest: Breath sounds normal. No respiratory distress. She has no wheezes. She has no rhonchi. She has no rales. She exhibits no tenderness.   Abdominal: Soft. She exhibits distension. She exhibits no mass. Bowel sounds are increased. There is tenderness in the right upper quadrant, epigastric area, periumbilical area, suprapubic area and left upper quadrant. There is no rigidity, no rebound, no guarding and no CVA tenderness. A hernia is present.   Musculoskeletal: Normal range of motion. She exhibits no edema or tenderness.   Neurological: She is alert and oriented to person, place, and time. She has normal strength. No sensory deficit.   Skin: Skin is warm and dry. Capillary refill takes 2 to 3 seconds.   Psychiatric: She has a normal mood and affect.         ED Course   Procedures  Labs Reviewed   CBC W/ AUTO DIFFERENTIAL - Abnormal; Notable for the following components:       Result Value    Platelets 98 (*)     Immature Grans (Abs) 0.05 (*)     All other components within normal limits   COMPREHENSIVE METABOLIC PANEL - Abnormal; Notable for the following components:    Sodium 133 (*)      Potassium 5.4 (*)     CO2 20 (*)     Glucose 125 (*)     AST 46 (*)     All other components within normal limits   URINALYSIS, REFLEX TO URINE CULTURE - Abnormal; Notable for the following components:    Leukocytes, UA 1+ (*)     All other components within normal limits    Narrative:     Preferred Collection Type->Urine, Clean Catch   LACTIC ACID, PLASMA - Abnormal; Notable for the following components:    Lactate (Lactic Acid) 2.3 (*)     All other components within normal limits   URINALYSIS MICROSCOPIC - Abnormal; Notable for the following components:    WBC, UA 17 (*)     Yeast, UA Many (*)     All other components within normal limits    Narrative:     Preferred Collection Type->Urine, Clean Catch   ISTAT PROCEDURE - Abnormal; Notable for the following components:    POC BUN 5 (*)     POC Creatinine 0.2 (*)     POC Potassium 2.6 (*)     POC Chloride 115 (*)     POC TCO2 (MEASURED) 14 (*)     POC Ionized Calcium 0.75 (*)     POC Hematocrit 21 (*)     All other components within normal limits   CULTURE, URINE   LIPASE   LACTIC ACID, PLASMA   POCT GLUCOSE MONITORING CONTINUOUS          Imaging Results           CT Abdomen Pelvis With Contrast (Final result)  Result time 03/11/20 20:40:03    Final result by Moris Guajardo MD (03/11/20 20:40:03)                 Impression:      1. Postoperative change of the small bowel with dilation and air-fluid levels suggesting a small bowel obstruction.  Recommend surgical follow-up.  2. Hepatic steatosis.  3. Small hiatal hernia.  4. Mild splenomegaly.  5. Minimal hydronephrosis of the right kidney.  6. Grade 1 spondylolisthesis of L5 on S1 with severe chronic changes.  7.  This report was flagged in Epic as abnormal.      Electronically signed by: Moris Guajardo  Date:    03/11/2020  Time:    20:40             Narrative:    EXAMINATION:  CT ABDOMEN PELVIS WITH CONTRAST    CLINICAL HISTORY:  Abdominal distension;    TECHNIQUE:  Low dose axial images, sagittal and coronal  reformations were obtained from the lung bases to the pubic symphysis following the IV administration of 75 mL of Omnipaque 350 and the oral administration of 15 mL of Omnipaque 350.    COMPARISON:  01/17/2020    FINDINGS:  Abdomen:    - Lower thorax:Small hiatal hernia.  Contrast in the distal esophagus could be associated with reflux.    - Lung bases: No infiltrates and no nodules.    - Liver: No focal mass.  Mild diffuse fatty infiltration.    - Gallbladder: Status post cholecystectomy.    - Bile Ducts: No evidence of intra or extra hepatic biliary ductal dilation.    - Spleen: Spleen is mildly enlarged.  No focal lesion is detected.    - Kidneys: The kidneys are normal in size.  No stone or soft tissue mass bilaterally.  Minimal hydronephrosis on the right.  No obstructing lesion is detected.  The ureters appear normal in course and caliber.    - Adrenals: Unremarkable.    - Pancreas: No mass or peripancreatic fat stranding.    - Retroperitoneum:  No significant adenopathy.    - Vascular: No abdominal aortic aneurysm.    - Abdominal wall:  Unremarkable.    Pelvis:    Mild distention of the urinary bladder.  No focal mass is identified.  No adenopathy.    Pessary device is noted.    Bowel/Mesentery:    There are postoperative changes of the distal small bowel with bowel sutures noted distally near the ileocecal junction.  Focal dilation with air-fluid level in this region on axial 36 of series 2.    Postop changes of a loop of small bowel in the mid pelvis on axial 53.    There is distention of the small bowel with air-fluid levels.  Findings could represent a small bowel obstruction.    There is feces and gas in the colon and rectum.    Bones:  No acute osseous abnormality and no suspicious lytic or blastic lesion.  Grade 1 spondylolisthesis of L5 on S1 with bilateral spondylolysis of L5 and bilateral facet arthropathy.  Severe disc space narrowing and severe bilateral foraminal narrowing.  Moderate to severe  central canal narrowing.    Moderate to severe disc space narrowing at other lumbar levels also.  Minimal grade 1 retrolisthesis of L2 on L3.                                 Medical Decision Making:   History:   Old Medical Records: I decided to obtain old medical records.  Initial Assessment:   76 yo W with pmhx GERD, IBS, DM, iron deficiency anemia, SBOs with multiple bowel resections, mixed urinary incontinence with pessary use, currently undergoing outpatient evaluation for vaginal bleeding status post biopsy presents with a chief complaint of abdominal pain.   Differential Diagnosis:   Gas pains, small-bowel obstruction, UTI, colitis  Clinical Tests:   Lab Tests: Ordered  Radiological Study: Ordered  ED Management:  Will administer IV fluids, analgesics, antiemetics.  Will obtain labs and CT abdomen/pelvis to evaluate for SBO.    Reassessment:  CBC without leukocytosis or anemia.  There is mild thrombocytopenia of 98 at baseline.  I-STAT with multiple derangements but hematocrit of 21 is inconsistent with hematocrit on CBC.  I suspect i-STAT to be hemolyzed.  CMP with mild hyponatremia of 133.  Potassium of 5.4.  Lipase normal.  Lactic acid 2.3.  Patient is drinking contrast at this time.  She reports initial improvement in pain but it is starting to return.  Will re-dose analgesics.  Urinalysis with white blood cells and yeast but no obvious infection.  Awaiting CT abdomen/pelvis.    Reassessment:  CT concerning for SBO.  General surgery consulted and will admit the patient.  They recommend deferring NG tube at this time.  On reassessment, patient reports pain improved.  She is resting in no acute distress and is hemodynamically stable                                   Clinical Impression:       ICD-10-CM ICD-9-CM   1. SBO (small bowel obstruction) K56.609 560.9         Disposition:   Disposition: Admitted     ED Disposition Condition    Admit                           Mor Orellana MD  03/11/20 2058

## 2020-03-11 NOTE — ED NOTES
Patient identifiers verified and correct for Anayeli Judd.    LOC: The patient is awake, alert and aware of environment with an appropriate affect, the patient is oriented x 4 and speaking appropriately. Ambulatory     APPEARANCE: Patient resting but verbalizes some abdominal pain    SKIN: The skin is warm and dry, color consistent with ethnicity, patient has normal skin turgor and moist mucus membranes, old surgical scars  MUSCULOSKELETAL: Patient moving all extremities spontaneously, no obvious swelling or deformities noted.  RESPIRATORY: Airway is open and patent, respirations are spontaneous, patient has a normal effort and rate, no accessory muscle use noted, bilateral breath sounds clear  CARDIAC: Patient has a normal rate and regular rhythm, no periphreal edema noted, capillary refill < 3 seconds.  ABDOMEN: Soft and mild tender to palpation, no distention noted, normoactive bowel sounds present in all four quadrants.  NEUROLOGIC: PERRLA, 3 mm bilaterally, eyes open spontaneously, behavior appropriate to situation, follows commands, facial expression symmetrical, bilateral hand grasp equal and even, purposeful motor response noted, normal sensation in all extremities when touched with a finger.

## 2020-03-12 LAB
ALBUMIN SERPL BCP-MCNC: 3.3 G/DL (ref 3.5–5.2)
ALP SERPL-CCNC: 57 U/L (ref 55–135)
ALT SERPL W/O P-5'-P-CCNC: 51 U/L (ref 10–44)
ANION GAP SERPL CALC-SCNC: 7 MMOL/L (ref 8–16)
AST SERPL-CCNC: 58 U/L (ref 10–40)
BASOPHILS # BLD AUTO: 0.03 K/UL (ref 0–0.2)
BASOPHILS NFR BLD: 0.5 % (ref 0–1.9)
BILIRUB SERPL-MCNC: 0.9 MG/DL (ref 0.1–1)
BUN SERPL-MCNC: 8 MG/DL (ref 8–23)
CALCIUM SERPL-MCNC: 8.3 MG/DL (ref 8.7–10.5)
CHLORIDE SERPL-SCNC: 103 MMOL/L (ref 95–110)
CO2 SERPL-SCNC: 27 MMOL/L (ref 23–29)
CREAT SERPL-MCNC: 0.7 MG/DL (ref 0.5–1.4)
DIFFERENTIAL METHOD: ABNORMAL
EOSINOPHIL # BLD AUTO: 0.2 K/UL (ref 0–0.5)
EOSINOPHIL NFR BLD: 3.3 % (ref 0–8)
ERYTHROCYTE [DISTWIDTH] IN BLOOD BY AUTOMATED COUNT: 14.3 % (ref 11.5–14.5)
EST. GFR  (AFRICAN AMERICAN): >60 ML/MIN/1.73 M^2
EST. GFR  (NON AFRICAN AMERICAN): >60 ML/MIN/1.73 M^2
GLUCOSE SERPL-MCNC: 122 MG/DL (ref 70–110)
HCT VFR BLD AUTO: 35.6 % (ref 37–48.5)
HGB BLD-MCNC: 11.4 G/DL (ref 12–16)
IMM GRANULOCYTES # BLD AUTO: 0.02 K/UL (ref 0–0.04)
IMM GRANULOCYTES NFR BLD AUTO: 0.3 % (ref 0–0.5)
LYMPHOCYTES # BLD AUTO: 1.8 K/UL (ref 1–4.8)
LYMPHOCYTES NFR BLD: 28.2 % (ref 18–48)
MAGNESIUM SERPL-MCNC: 1.3 MG/DL (ref 1.6–2.6)
MCH RBC QN AUTO: 30.5 PG (ref 27–31)
MCHC RBC AUTO-ENTMCNC: 32 G/DL (ref 32–36)
MCV RBC AUTO: 95 FL (ref 82–98)
MONOCYTES # BLD AUTO: 0.7 K/UL (ref 0.3–1)
MONOCYTES NFR BLD: 10.5 % (ref 4–15)
NEUTROPHILS # BLD AUTO: 3.7 K/UL (ref 1.8–7.7)
NEUTROPHILS NFR BLD: 57.2 % (ref 38–73)
NRBC BLD-RTO: 0 /100 WBC
PHOSPHATE SERPL-MCNC: 1.4 MG/DL (ref 2.7–4.5)
PLATELET # BLD AUTO: 75 K/UL (ref 150–350)
PMV BLD AUTO: 10.5 FL (ref 9.2–12.9)
POCT GLUCOSE: 132 MG/DL (ref 70–110)
POTASSIUM SERPL-SCNC: 4.2 MMOL/L (ref 3.5–5.1)
PROT SERPL-MCNC: 6.1 G/DL (ref 6–8.4)
RBC # BLD AUTO: 3.74 M/UL (ref 4–5.4)
SODIUM SERPL-SCNC: 137 MMOL/L (ref 136–145)
WBC # BLD AUTO: 6.38 K/UL (ref 3.9–12.7)

## 2020-03-12 PROCEDURE — 25000003 PHARM REV CODE 250: Performed by: STUDENT IN AN ORGANIZED HEALTH CARE EDUCATION/TRAINING PROGRAM

## 2020-03-12 PROCEDURE — C9113 INJ PANTOPRAZOLE SODIUM, VIA: HCPCS | Performed by: STUDENT IN AN ORGANIZED HEALTH CARE EDUCATION/TRAINING PROGRAM

## 2020-03-12 PROCEDURE — 63600175 PHARM REV CODE 636 W HCPCS: Performed by: STUDENT IN AN ORGANIZED HEALTH CARE EDUCATION/TRAINING PROGRAM

## 2020-03-12 PROCEDURE — 84100 ASSAY OF PHOSPHORUS: CPT

## 2020-03-12 PROCEDURE — 83735 ASSAY OF MAGNESIUM: CPT

## 2020-03-12 PROCEDURE — 80053 COMPREHEN METABOLIC PANEL: CPT

## 2020-03-12 PROCEDURE — 99232 PR SUBSEQUENT HOSPITAL CARE,LEVL II: ICD-10-PCS | Mod: ,,, | Performed by: SURGERY

## 2020-03-12 PROCEDURE — 20600001 HC STEP DOWN PRIVATE ROOM

## 2020-03-12 PROCEDURE — 99232 SBSQ HOSP IP/OBS MODERATE 35: CPT | Mod: ,,, | Performed by: SURGERY

## 2020-03-12 PROCEDURE — 85025 COMPLETE CBC W/AUTO DIFF WBC: CPT

## 2020-03-12 RX ORDER — CEFAZOLIN SODIUM 1 G/3ML
1 INJECTION, POWDER, FOR SOLUTION INTRAMUSCULAR; INTRAVENOUS
Status: DISCONTINUED | OUTPATIENT
Start: 2020-03-12 | End: 2020-03-13

## 2020-03-12 RX ORDER — MORPHINE SULFATE 2 MG/ML
1 INJECTION, SOLUTION INTRAMUSCULAR; INTRAVENOUS EVERY 4 HOURS PRN
Status: DISCONTINUED | OUTPATIENT
Start: 2020-03-12 | End: 2020-03-13

## 2020-03-12 RX ADMIN — SODIUM PHOSPHATE, MONOBASIC, MONOHYDRATE 39.99 MMOL: 276; 142 INJECTION, SOLUTION INTRAVENOUS at 01:03

## 2020-03-12 RX ADMIN — MAGNESIUM SULFATE HEPTAHYDRATE 3 G: 500 INJECTION, SOLUTION INTRAMUSCULAR; INTRAVENOUS at 09:03

## 2020-03-12 RX ADMIN — CEFAZOLIN 1 G: 1 INJECTION, POWDER, FOR SOLUTION INTRAMUSCULAR; INTRAVENOUS at 02:03

## 2020-03-12 RX ADMIN — MORPHINE SULFATE 1 MG: 2 INJECTION, SOLUTION INTRAMUSCULAR; INTRAVENOUS at 02:03

## 2020-03-12 RX ADMIN — LEVOTHYROXINE SODIUM 100 MCG: 100 TABLET ORAL at 06:03

## 2020-03-12 RX ADMIN — MORPHINE SULFATE 1 MG: 2 INJECTION, SOLUTION INTRAMUSCULAR; INTRAVENOUS at 08:03

## 2020-03-12 RX ADMIN — MORPHINE SULFATE 1 MG: 2 INJECTION, SOLUTION INTRAMUSCULAR; INTRAVENOUS at 06:03

## 2020-03-12 RX ADMIN — SODIUM CHLORIDE, SODIUM LACTATE, POTASSIUM CHLORIDE, AND CALCIUM CHLORIDE: .6; .31; .03; .02 INJECTION, SOLUTION INTRAVENOUS at 11:03

## 2020-03-12 RX ADMIN — PANTOPRAZOLE SODIUM 40 MG: 40 INJECTION, POWDER, FOR SOLUTION INTRAVENOUS at 09:03

## 2020-03-12 RX ADMIN — MORPHINE SULFATE 1 MG: 2 INJECTION, SOLUTION INTRAMUSCULAR; INTRAVENOUS at 11:03

## 2020-03-12 RX ADMIN — SODIUM CHLORIDE, SODIUM LACTATE, POTASSIUM CHLORIDE, AND CALCIUM CHLORIDE: .6; .31; .03; .02 INJECTION, SOLUTION INTRAVENOUS at 02:03

## 2020-03-12 RX ADMIN — CEFAZOLIN 1 G: 1 INJECTION, POWDER, FOR SOLUTION INTRAMUSCULAR; INTRAVENOUS at 10:03

## 2020-03-12 RX ADMIN — ACETAMINOPHEN 650 MG: 325 TABLET ORAL at 11:03

## 2020-03-12 NOTE — ED NOTES
Pt care assumed. Report received by SADIQ Montaño. Pt lying in stretcher in low and locked position and side rails raised x2. Call light, pt's belongings, and bedside table within pt's reach. Pt on continuous pulse oximetry, and BP cycling every 30 minutes. Pt in NAD and verbalized no needs at this time.

## 2020-03-12 NOTE — ED NOTES
Assumed care of patient. Patient on automatic blood pressure cuff and continuous pulse oximeter. VSS. AAOx3. Side rails up and bed in lowest position. Call light in reach. Instructed patient to call staff for assistance with mobility. Ng tube in placed to intermittent wall suction. Will continue to monitor.

## 2020-03-12 NOTE — ED NOTES
Pt with 20-50ml of joseline blood in NG tubing, suction paused. Surgical team paged and informed this happened in the AM as well per previous RN. MD reported to flush tubing and call back if bleeding continued. Also made aware of PO meds and given verbal order to change to IV.

## 2020-03-12 NOTE — SUBJECTIVE & OBJECTIVE
Interval History: NG placed with 500cc output. No abdominal pain. Minimal nausea. No flatus.     Medications:  Continuous Infusions:   lactated ringers 125 mL/hr at 03/11/20 2119     Scheduled Meds:   acetaZOLAMIDE  250 mg Oral TID    cephALEXin  250 mg Oral Daily    enoxparin  40 mg Subcutaneous Q24H    levothyroxine  100 mcg Oral QAM    pantoprazole  40 mg Intravenous Daily     PRN Meds:acetaminophen, Dextrose 10% Bolus, glucagon (human recombinant), insulin aspart U-100, ondansetron, promethazine, sodium chloride 0.9%     Review of patient's allergies indicates:   Allergen Reactions    Dilaudid [hydromorphone (bulk)] Other (See Comments)     Oversedating, head burning. Pt prefers to avoid.       Codeine Nausea And Vomiting     Other reaction(s): Itching    Percocet  [oxycodone-acetaminophen]      Other reaction(s): Itching    Sulfa (sulfonamide antibiotics)      Other reaction(s): Nausea  Other reaction(s): Itching     Objective:     Vital Signs (Most Recent):  Temp: 98 °F (36.7 °C) (03/11/20 1458)  Pulse: 78 (03/12/20 0630)  Resp: 18 (03/12/20 0630)  BP: (!) 111/56 (03/12/20 0630)  SpO2: 100 % (03/12/20 0630) Vital Signs (24h Range):  Temp:  [98 °F (36.7 °C)] 98 °F (36.7 °C)  Pulse:  [70-83] 78  Resp:  [16-19] 18  SpO2:  [95 %-100 %] 100 %  BP: (102-135)/(50-85) 111/56     Weight: 64 kg (141 lb)  Body mass index is 24.98 kg/m².    Intake/Output - Last 3 Shifts       03/10 0700 - 03/11 0659 03/11 0700 - 03/12 0659    IV Piggyback  1000    Total Intake(mL/kg)  1000 (15.6)    Net  +1000                Physical Exam   General: well developed, well nourished, no distress  Lungs:  clear to auscultation bilaterally and normal respiratory effort  Heart: regular rate and rhythm, S1, S2 normal, no murmur, rub or gallop  Abdomen: R subcostal incision is clean and dry. Mild distention of abdomen. Soft, non tender. Midline incision also well healed. No hernias.   Neuro: pleasant, alert and oriented.   Ext: wwp, no  edema     Significant Labs:  CBC:   Recent Labs   Lab 03/12/20 0357   WBC 6.38   RBC 3.74*   HGB 11.4*   HCT 35.6*   PLT 75*   MCV 95   MCH 30.5   MCHC 32.0     CMP:   Recent Labs   Lab 03/12/20 0357   *   CALCIUM 8.3*   ALBUMIN 3.3*   PROT 6.1      K 4.2   CO2 27      BUN 8   CREATININE 0.7   ALKPHOS 57   ALT 51*   AST 58*   BILITOT 0.9       Significant Diagnostics:  None

## 2020-03-12 NOTE — ED NOTES
Adalid blood in NG tubing. MD paged and made aware that this is the third time today, verbal order to flush and continue low intermittent suction.

## 2020-03-12 NOTE — ASSESSMENT & PLAN NOTE
74 yo female wih hx of multiple bowel obstructions who presents with recurrent SBO    - NG to LIWS  - xray this morning to assess contrast from scan. Probable gastrograffin challenge today.   - strict I/o  - LR @ 125  - prn dilaudid for pain  - prn zofran/phenergan for nausea  - lovenox, SCDs, TEDs  - SSI, POCT glucose for dm

## 2020-03-12 NOTE — CONSULTS
Consult Note  General Surgery    CC:   Chief Complaint   Patient presents with    Abdominal Pain     Pt c/o abdominal pain.  Reports hx of blockages.       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 She has been worked up for chronic abdominal pain and diarrhea. Was seen by GI (Dr. Alvarez) in February with similar symptoms. Had CT scan at that time showing thickening and inflammatory changes of the cecum and right colon - she was prescribed a course of oral antibiotics. She also has history of chronic UTI - currently on keflex daily.     Her SBO history includes ex lap with Dr. Silvestre in  - had SBRx2 and enterolysis that failed with conservative management. Then again in , underwent ex lap with Dr. Ward. Had small bowel repair x 14 and placement of G tube. She had dense adhesions that required lysis for >2 hours. Her last episode that she was hospitalized was in 2018 - treated conservatively.     Began to have nausea this morning but has had no episodes of emesis. Passed flatus this morning. Multiple episodes of diarrhea (non bloody) - is her baseline. Last BM was this morning. Family at bedside. Her sister also tells me that she has been developing dementia.     Last colonoscopy was 2017 - small tubular adenomas removed from transverse colon.  Colonic biopsies negative for microscopic colitis.     Also endorses recent vaginal bleeding and underwent biopsy a weeks ago.     PMH:   Past Medical History:   Diagnosis Date    Abdominal pain     Allergic rhinitis     Arthritis     Blood transfusion     during delivery and     Bowel obstruction     Cervical radiculopathy     followed by dr cloud    Colon cancer     transverse colon; resected; Stage IIA (pT3 pN0 MX)     Diarrhea     Family history of breast cancer     Family history of colon cancer     Fatty liver     GERD (gastroesophageal reflux disease)     History of shingles     Hyperlipidemia     Hypothyroidism     Irritable bowel syndrome     Microscopic colitis     treated 2013    Raynaud phenomenon     Raynaud's disease     Type 2 diabetes mellitus        PSH:   Past Surgical History:   Procedure Laterality Date    APPENDECTOMY      BACK SURGERY      CARPAL TUNNEL RELEASE      bilateral      SECTION      CHOLECYSTECTOMY  1965    COLECTOMY  2011    Transverse colon resection by Dr. Aguirre    COLONOSCOPY N/A 2017    Procedure: COLONOSCOPY;  Surgeon: Manjit Alvarez MD;  Location: [facility] (4TH FLR);  Service: Endoscopy;  Laterality: N/A;    COLONOSCOPY N/A 2019    Procedure: COLONOSCOPY;  Surgeon: Ramiro Jefferson MD;  Location: [facility] (4TH FLR);  Service: Endoscopy;  Laterality: N/A;  PM prep    ENDOSCOPIC ULTRASOUND OF UPPER GASTROINTESTINAL TRACT N/A 2018    Procedure: ULTRASOUND, UPPER GI TRACT, ENDOSCOPIC;  Surgeon: Jose Hess MD;  Location: [facility] ENDO;  Service: Endoscopy;  Laterality: N/A;    ENDOSCOPIC ULTRASOUND OF UPPER GASTROINTESTINAL TRACT N/A 2019    Procedure: ULTRASOUND, UPPER GI TRACT, ENDOSCOPIC;  Surgeon: Jose Hess MD;  Location: [facility] (2ND FLR);  Service: Endoscopy;  Laterality: N/A;    ESOPHAGOGASTRODUODENOSCOPY N/A 2018    Procedure: EGD (ESOPHAGOGASTRODUODENOSCOPY);  Surgeon: Angelo Reynolds MD;  Location: [facility] (2ND FLR);  Service: Endoscopy;  Laterality: N/A;    ESOPHAGOGASTRODUODENOSCOPY N/A 2019    Procedure: EGD (ESOPHAGOGASTRODUODENOSCOPY);  Surgeon: Ramiro Jefferson MD;  Location: [facility] (4TH FLR);  Service: Endoscopy;  Laterality: N/A;    EYE SURGERY      Cataract Removal    HYSTERECTOMY      posterolateral fusion with autograft bone and Eun mineralized bone matrix  13    at  [facility] for lumbar spine stenosis    TOE SURGERY      TONSILLECTOMY      TRIGGER FINGER RELEASE         Allergies:   Review of patient's allergies indicates:   Allergen Reactions    Dilaudid [hydromorphone (bulk)] Other (See Comments)     Oversedating, head burning. Pt prefers to avoid.       Codeine Nausea And Vomiting     Other reaction(s): Itching    Percocet  [oxycodone-acetaminophen]      Other reaction(s): Itching    Sulfa (sulfonamide antibiotics)      Other reaction(s): Nausea  Other reaction(s): Itching     Meds:   Patient's Medications   New Prescriptions    No medications on file   Previous Medications    ACETAZOLAMIDE (DIAMOX) 250 MG TABLET    Take 1 tablet (250 mg total) by mouth 3 (three) times daily.    ATORVASTATIN (LIPITOR) 40 MG TABLET    TAKE 1 TABLET(40 MG) BY MOUTH EVERY DAY    AZELASTINE (ASTELIN) 137 MCG NASAL SPRAY    1 spray (137 mcg total) by Nasal route 2 (two) times daily.    BETAMETHASONE DIPROPIONATE (DIPROLENE) 0.05 % OINTMENT    Apply every Am x 2 weeks then twice weekly    BLOOD SUGAR DIAGNOSTIC STRP    Dispense Avatrip contour strips; test blood sugar once daily    BLOOD-GLUCOSE METER (CONTOUR METER) KIT    Please dispense Biba Contour meter and supplies Use as instructed Test Blood sugar once daily    CEPHALEXIN (KEFLEX) 250 MG CAPSULE    Take 1 capsule (250 mg total) by mouth once daily.    CHOLESTYRAMINE-ASPARTAME (QUESTRAN/PREVALITE LIGHT) 4 GRAM POWD    Take 4 g by mouth once daily.    DIPHENHYDRAMINE (BENADRYL) 25 MG CAPSULE    Take 1 each (25 mg total) by mouth nightly as needed for Itching, Allergies or Insomnia.    DOCUSATE SODIUM (COLACE) 100 MG CAPSULE    Take 1 capsule (100 mg total) by mouth 2 (two) times daily.    DULOXETINE (CYMBALTA) 30 MG CAPSULE        FLUTICASONE (FLONASE) 50 MCG/ACTUATION NASAL SPRAY    2 sprays by Each Nare route daily as needed.     FREESTYLE EFREN 10 DAY SENSOR KIT    Check blood glucose every day. Please dispense 6 mos supply.    FREESTYLE  EFREN READER MISC    TEST as directed    GABAPENTIN (NEURONTIN) 600 MG TABLET    Take 1,200 mg by mouth 2 (two) times daily.     HYDROCORTISONE 2.5 % CREAM        LACTOBACILLUS RHAMNOSUS GG (CULTURELLE) 10 BILLION CELL CAPSULE    Take 1 capsule by mouth once daily.    LANCETS (ONE TOUCH ULTRASOFT LANCETS) MISC    Test blood sugar once daily    LEVOTHYROXINE (SYNTHROID) 100 MCG TABLET    TAKE 1 TABLET BY MOUTH EVERY MORNING    LIDOCAINE (LIDODERM) 5 %        LINAGLIPTIN (TRADJENTA) 5 MG TAB TABLET    Take 1 tablet (5 mg total) by mouth once daily.    MAGNESIUM 30 MG TAB    Take 500 capsules by mouth. Patient's taking magnesium 500mg QD    METFORMIN (GLUCOPHAGE-XR) 750 MG 24 HR TABLET    TAKE 1 TABLET(750 MG) BY MOUTH TWICE DAILY WITH MEALS    METHOCARBAMOL (ROBAXIN) 500 MG TAB    Take 500 mg by mouth every evening.    MIRABEGRON (MYRBETRIQ) 50 MG TB24    Take 1 tablet (50 mg total) by mouth once daily.    MUPIROCIN (BACTROBAN) 2 % OINTMENT    Apply topically 3 (three) times daily.    NITROFURANTOIN, MACROCRYSTAL-MONOHYDRATE, (MACROBID) 100 MG CAPSULE    Take 1 capsule (100 mg total) by mouth 2 (two) times daily.    ONDANSETRON (ZOFRAN) 4 MG TABLET    Take 1 tablet (4 mg total) by mouth every 8 (eight) hours as needed for Nausea.    ONDANSETRON (ZOFRAN-ODT) 8 MG TBDL        OSPEMIFENE (OSPHENA) 60 MG TAB    Take 60 mg by mouth once daily.    PANTOPRAZOLE (PROTONIX) 40 MG TABLET    Take 1 tablet (40 mg total) by mouth once daily.    POLYETHYLENE GLYCOL (GLYCOLAX) 17 GRAM/DOSE POWDER    Take 17 g by mouth once daily.    PREMARIN VAGINAL CREAM    PLACE 0.5 GRAM VAGINALLY EVERY EVENING    TRAMADOL (ULTRAM) 50 MG TABLET        TRIAMCINOLONE ACETONIDE 0.1% (KENALOG) 0.1 % PASTE    apply to affected area with A Q-TIP three times a day    VITAMIN D 1000 UNITS TAB    Take 185 mg by mouth once daily. D3   Modified Medications    No medications on file   Discontinued Medications    No medications on file       Family History    Problem Relation Age of Onset    Heart disease Father 50        Mi age 50    Colon cancer Father     Bladder Cancer Mother         non smoker    Cataracts Mother     Glaucoma Mother     Heart disease Mother     Hyperlipidemia Mother     Kidney disease Mother     Breast cancer Sister 79    Arthritis Daughter     Asthma Daughter     Depression Daughter     Hypertension Daughter     Stroke Daughter 40    Arthritis Brother     Colon cancer Brother 70    Alcohol abuse Brother     Parkinsonism Brother     Alcohol abuse Brother     Depression Daughter     Celiac disease Neg Hx     Cirrhosis Neg Hx     Colon polyps Neg Hx     Crohn's disease Neg Hx     Cystic fibrosis Neg Hx     Esophageal cancer Neg Hx     Hemochromatosis Neg Hx     Inflammatory bowel disease Neg Hx     Irritable bowel syndrome Neg Hx     Liver cancer Neg Hx     Liver disease Neg Hx     Rectal cancer Neg Hx     Stomach cancer Neg Hx     Ulcerative colitis Neg Hx     Eliazar's disease Neg Hx     Amblyopia Neg Hx     Blindness Neg Hx     Macular degeneration Neg Hx     Retinal detachment Neg Hx     Strabismus Neg Hx     Melanoma Neg Hx        Review of Systems - For pertinent positives please see HPI  Constitutional: Negative for fever and chills.   HENT: Negative for congestion and ear pain.   Respiratory: Negative for cough and shortness of breath.   Cardiovascular: Negative for chest pain and palpitations.   Gastrointestinal: see HPI +decreased appetite.   Genitourinary: +urinary incontinence, vaginal bleeding.   Musculoskeletal: Negative for myalgias and arthralgias.   Skin: Negative for rash and wound.   Neurological: confusion.     OBJECTIVE:     Vital Signs (Most Recent)  Temp: 98 °F (36.7 °C) (03/11/20 1458)  Pulse: 72 (03/11/20 1743)  Resp: 19 (03/11/20 1633)  BP: (!) 116/55 (03/11/20 1743)  SpO2: 96 % (03/11/20 1743)    Vital Signs Range (Last 24H):  Temp:  [98 °F (36.7 °C)]   Pulse:  [72-83]   Resp:   [18-19]   BP: (106-124)/(55-58)   SpO2:  [95 %-100 %]     I & O (Last 24H):    Intake/Output Summary (Last 24 hours) at 3/11/2020 2019  Last data filed at 3/11/2020 1920  Gross per 24 hour   Intake 1000 ml   Output --   Net 1000 ml       Physical Exam:  General: well developed, well nourished, no distress  Lungs:  clear to auscultation bilaterally and normal respiratory effort  Heart: regular rate and rhythm, S1, S2 normal, no murmur, rub or gallop  Abdomen: R subcostal incision is clean and dry. Mild distention of abdomen. Soft, non tender. Midline incision also well healed. No hernias.   Neuro: pleasant, alert and oriented.   Ext: wwp, no edema       Laboratory:  CBC:   Recent Labs   Lab 03/11/20  1643 03/11/20  1709   WBC 10.11  --    RBC 4.41  --    HGB 13.5  --    HCT 42.0 21*   PLT 98*  --    MCV 95  --    MCH 30.6  --    MCHC 32.1  --      CMP:   Recent Labs   Lab 03/11/20  1643   *   CALCIUM 9.4   ALBUMIN 4.2   PROT 7.9   *   K 5.4*   CO2 20*   CL 99   BUN 10   CREATININE 0.9   ALKPHOS 55   ALT 36   AST 46*   BILITOT 1.0     Labs within the past 24 hours have been reviewed.    CT scan reviewed: multiple dilated loops of small bowel. No obvious transition point. Oral contrast to mid small bowel.   FINDINGS:  Abdomen:  - Lower thorax:Small hiatal hernia.  Contrast in the distal esophagus could be associated with reflux.  - Lung bases: No infiltrates and no nodules.  - Liver: No focal mass.  Mild diffuse fatty infiltration.  - Gallbladder: Status post cholecystectomy.  - Bile Ducts: No evidence of intra or extra hepatic biliary ductal dilation.  - Spleen: Spleen is mildly enlarged.  No focal lesion is detected.  - Kidneys: The kidneys are normal in size.  No stone or soft tissue mass bilaterally.  Minimal hydronephrosis on the right.  No obstructing lesion is detected.  The ureters appear normal in course and caliber.  - Adrenals: Unremarkable.  - Pancreas: No mass or peripancreatic fat  stranding.  - Retroperitoneum:  No significant adenopathy.  - Vascular: No abdominal aortic aneurysm.  - Abdominal wall:  Unremarkable.  Pelvis:  Mild distention of the urinary bladder.  No focal mass is identified.  No adenopathy.  Pessary device is noted.  Bowel/Mesentery:  There are postoperative changes of the distal small bowel with bowel sutures noted distally near the ileocecal junction.  Focal dilation with air-fluid level in this region on axial 36 of series 2.  Postop changes of a loop of small bowel in the mid pelvis on axial 53.  There is distention of the small bowel with air-fluid levels.  Findings could represent a small bowel obstruction.  There is feces and gas in the colon and rectum.  Bones:  No acute osseous abnormality and no suspicious lytic or blastic lesion.  Grade 1 spondylolisthesis of L5 on S1 with bilateral spondylolysis of L5 and bilateral facet arthropathy.  Severe disc space narrowing and severe bilateral foraminal narrowing.  Moderate to severe central canal narrowing.  Moderate to severe disc space narrowing at other lumbar levels also.  Minimal grade 1 retrolisthesis of L2 on L3.    ASSESSMENT/PLAN:     76 yo female wih hx of multiple bowel obstructions who presents with recurrent SBO    - admit to general surgery, Dr. Silvestre  - NG to be placed to [facility] due to dementia and aspiration risk.   - k elevated on cmp but blood hemolyzed. Will repeat chemistry   - strict I/o  - LR @ 125  - prn dilaudid for pain  - prn zofran/phenergan for nausea  - lovenox, SCDs, TEDs  - received contrast with CT scan which may aide in SBO resolution   - SSI, POCT glucose for dm     Larry Mc, PGY-5  General Surgery  [phone]

## 2020-03-12 NOTE — PROGRESS NOTES
[facility]  General Surgery  Progress Note    Subjective:     History of Present Illness:  No notes on file    Post-Op Info:  * No surgery found *         Interval History: NG placed with 500cc output. No abdominal pain. Minimal nausea. No flatus.     Medications:  Continuous Infusions:   lactated ringers 125 mL/hr at 03/11/20 2119     Scheduled Meds:   acetaZOLAMIDE  250 mg Oral TID    cephALEXin  250 mg Oral Daily    enoxparin  40 mg Subcutaneous Q24H    levothyroxine  100 mcg Oral QAM    pantoprazole  40 mg Intravenous Daily     PRN Meds:acetaminophen, Dextrose 10% Bolus, glucagon (human recombinant), insulin aspart U-100, ondansetron, promethazine, sodium chloride 0.9%     Review of patient's allergies indicates:   Allergen Reactions    Dilaudid [hydromorphone (bulk)] Other (See Comments)     Oversedating, head burning. Pt prefers to avoid.       Codeine Nausea And Vomiting     Other reaction(s): Itching    Percocet  [oxycodone-acetaminophen]      Other reaction(s): Itching    Sulfa (sulfonamide antibiotics)      Other reaction(s): Nausea  Other reaction(s): Itching     Objective:     Vital Signs (Most Recent):  Temp: 98 °F (36.7 °C) (03/11/20 1458)  Pulse: 78 (03/12/20 0630)  Resp: 18 (03/12/20 0630)  BP: (!) 111/56 (03/12/20 0630)  SpO2: 100 % (03/12/20 0630) Vital Signs (24h Range):  Temp:  [98 °F (36.7 °C)] 98 °F (36.7 °C)  Pulse:  [70-83] 78  Resp:  [16-19] 18  SpO2:  [95 %-100 %] 100 %  BP: (102-135)/(50-85) 111/56     Weight: 64 kg (141 lb)  Body mass index is 24.98 kg/m².    Intake/Output - Last 3 Shifts       03/10 0700 - 03/11 0659 03/11 0700 - 03/12 0659    IV Piggyback  1000    Total Intake(mL/kg)  1000 (15.6)    Net  +1000                Physical Exam   General: well developed, well nourished, no distress  Lungs:  clear to auscultation bilaterally and normal respiratory effort  Heart: regular rate and rhythm, S1, S2 normal, no murmur, rub or gallop  Abdomen: R subcostal  incision is clean and dry. Mild distention of abdomen. Soft, non tender. Midline incision also well healed. No hernias.   Neuro: pleasant, alert and oriented.   Ext: wwp, no edema     Significant Labs:  CBC:   Recent Labs   Lab 03/12/20  0357   WBC 6.38   RBC 3.74*   HGB 11.4*   HCT 35.6*   PLT 75*   MCV 95   MCH 30.5   MCHC 32.0     CMP:   Recent Labs   Lab 03/12/20  0357   *   CALCIUM 8.3*   ALBUMIN 3.3*   PROT 6.1      K 4.2   CO2 27      BUN 8   CREATININE 0.7   ALKPHOS 57   ALT 51*   AST 58*   BILITOT 0.9       Significant Diagnostics:  None    Assessment/Plan:     SBO (small bowel obstruction)  74 yo female wih hx of multiple bowel obstructions who presents with recurrent SBO    - NG  to [facility]  - xray this morning to assess contrast from scan. Probable gastrograffin challenge today.   - strict I/o  - LR @ 125  - prn dilaudid for pain  - prn zofran/phenergan for nausea  - lovenox, SCDs, TEDs  - SSI, POCT glucose for dm         Larry Mc MD  General Surgery  [facility]

## 2020-03-13 VITALS
OXYGEN SATURATION: 96 % | TEMPERATURE: 99 F | RESPIRATION RATE: 16 BRPM | HEIGHT: 62 IN | WEIGHT: 142.44 LBS | BODY MASS INDEX: 26.21 KG/M2 | HEART RATE: 73 BPM | SYSTOLIC BLOOD PRESSURE: 120 MMHG | DIASTOLIC BLOOD PRESSURE: 59 MMHG

## 2020-03-13 LAB
ALBUMIN SERPL BCP-MCNC: 2.9 G/DL (ref 3.5–5.2)
ALP SERPL-CCNC: 52 U/L (ref 55–135)
ALT SERPL W/O P-5'-P-CCNC: 31 U/L (ref 10–44)
ANION GAP SERPL CALC-SCNC: 8 MMOL/L (ref 8–16)
AST SERPL-CCNC: 31 U/L (ref 10–40)
BACTERIA UR CULT: NORMAL
BACTERIA UR CULT: NORMAL
BASOPHILS # BLD AUTO: 0.02 K/UL (ref 0–0.2)
BASOPHILS NFR BLD: 0.4 % (ref 0–1.9)
BILIRUB SERPL-MCNC: 0.8 MG/DL (ref 0.1–1)
BUN SERPL-MCNC: 5 MG/DL (ref 8–23)
CALCIUM SERPL-MCNC: 8.1 MG/DL (ref 8.7–10.5)
CHLORIDE SERPL-SCNC: 105 MMOL/L (ref 95–110)
CO2 SERPL-SCNC: 27 MMOL/L (ref 23–29)
CREAT SERPL-MCNC: 0.7 MG/DL (ref 0.5–1.4)
DIFFERENTIAL METHOD: ABNORMAL
EOSINOPHIL # BLD AUTO: 0.2 K/UL (ref 0–0.5)
EOSINOPHIL NFR BLD: 3.9 % (ref 0–8)
ERYTHROCYTE [DISTWIDTH] IN BLOOD BY AUTOMATED COUNT: 14.6 % (ref 11.5–14.5)
EST. GFR  (AFRICAN AMERICAN): >60 ML/MIN/1.73 M^2
EST. GFR  (NON AFRICAN AMERICAN): >60 ML/MIN/1.73 M^2
GLUCOSE SERPL-MCNC: 128 MG/DL (ref 70–110)
HCT VFR BLD AUTO: 35.1 % (ref 37–48.5)
HGB BLD-MCNC: 11 G/DL (ref 12–16)
IMM GRANULOCYTES # BLD AUTO: 0.01 K/UL (ref 0–0.04)
IMM GRANULOCYTES NFR BLD AUTO: 0.2 % (ref 0–0.5)
LYMPHOCYTES # BLD AUTO: 1.1 K/UL (ref 1–4.8)
LYMPHOCYTES NFR BLD: 23.3 % (ref 18–48)
MAGNESIUM SERPL-MCNC: 1.6 MG/DL (ref 1.6–2.6)
MCH RBC QN AUTO: 30.3 PG (ref 27–31)
MCHC RBC AUTO-ENTMCNC: 31.3 G/DL (ref 32–36)
MCV RBC AUTO: 97 FL (ref 82–98)
MONOCYTES # BLD AUTO: 0.5 K/UL (ref 0.3–1)
MONOCYTES NFR BLD: 10.5 % (ref 4–15)
NEUTROPHILS # BLD AUTO: 3 K/UL (ref 1.8–7.7)
NEUTROPHILS NFR BLD: 61.7 % (ref 38–73)
NRBC BLD-RTO: 0 /100 WBC
PHOSPHATE SERPL-MCNC: 1.5 MG/DL (ref 2.7–4.5)
PLATELET # BLD AUTO: 72 K/UL (ref 150–350)
PMV BLD AUTO: 11.7 FL (ref 9.2–12.9)
POCT GLUCOSE: 121 MG/DL (ref 70–110)
POCT GLUCOSE: 163 MG/DL (ref 70–110)
POTASSIUM SERPL-SCNC: 3.9 MMOL/L (ref 3.5–5.1)
PROT SERPL-MCNC: 5.6 G/DL (ref 6–8.4)
RBC # BLD AUTO: 3.63 M/UL (ref 4–5.4)
SODIUM SERPL-SCNC: 140 MMOL/L (ref 136–145)
WBC # BLD AUTO: 4.84 K/UL (ref 3.9–12.7)

## 2020-03-13 PROCEDURE — 97530 THERAPEUTIC ACTIVITIES: CPT

## 2020-03-13 PROCEDURE — 25000003 PHARM REV CODE 250: Performed by: SURGERY

## 2020-03-13 PROCEDURE — 25000003 PHARM REV CODE 250: Performed by: STUDENT IN AN ORGANIZED HEALTH CARE EDUCATION/TRAINING PROGRAM

## 2020-03-13 PROCEDURE — 97116 GAIT TRAINING THERAPY: CPT

## 2020-03-13 PROCEDURE — 80053 COMPREHEN METABOLIC PANEL: CPT

## 2020-03-13 PROCEDURE — 63600175 PHARM REV CODE 636 W HCPCS: Performed by: STUDENT IN AN ORGANIZED HEALTH CARE EDUCATION/TRAINING PROGRAM

## 2020-03-13 PROCEDURE — 84100 ASSAY OF PHOSPHORUS: CPT

## 2020-03-13 PROCEDURE — 97165 OT EVAL LOW COMPLEX 30 MIN: CPT

## 2020-03-13 PROCEDURE — 97161 PT EVAL LOW COMPLEX 20 MIN: CPT

## 2020-03-13 PROCEDURE — 85025 COMPLETE CBC W/AUTO DIFF WBC: CPT

## 2020-03-13 PROCEDURE — 36415 COLL VENOUS BLD VENIPUNCTURE: CPT

## 2020-03-13 PROCEDURE — 83735 ASSAY OF MAGNESIUM: CPT

## 2020-03-13 PROCEDURE — 99238 PR HOSPITAL DISCHARGE DAY,<30 MIN: ICD-10-PCS | Mod: ,,, | Performed by: SURGERY

## 2020-03-13 PROCEDURE — 99238 HOSP IP/OBS DSCHRG MGMT 30/<: CPT | Mod: ,,, | Performed by: SURGERY

## 2020-03-13 RX ORDER — SODIUM,POTASSIUM PHOSPHATES 280-250MG
2 POWDER IN PACKET (EA) ORAL ONCE
Status: COMPLETED | OUTPATIENT
Start: 2020-03-13 | End: 2020-03-13

## 2020-03-13 RX ORDER — GABAPENTIN 300 MG/1
600 CAPSULE ORAL 2 TIMES DAILY
Status: DISCONTINUED | OUTPATIENT
Start: 2020-03-13 | End: 2020-03-13 | Stop reason: HOSPADM

## 2020-03-13 RX ORDER — DOCUSATE SODIUM 100 MG/1
100 CAPSULE, LIQUID FILLED ORAL 2 TIMES DAILY
Status: DISCONTINUED | OUTPATIENT
Start: 2020-03-13 | End: 2020-03-13 | Stop reason: HOSPADM

## 2020-03-13 RX ORDER — METHOCARBAMOL 500 MG/1
500 TABLET, FILM COATED ORAL NIGHTLY
Status: DISCONTINUED | OUTPATIENT
Start: 2020-03-13 | End: 2020-03-13 | Stop reason: HOSPADM

## 2020-03-13 RX ORDER — CEPHALEXIN 250 MG/1
250 CAPSULE ORAL DAILY
Status: DISCONTINUED | OUTPATIENT
Start: 2020-03-13 | End: 2020-03-13 | Stop reason: HOSPADM

## 2020-03-13 RX ORDER — CEPHALEXIN 250 MG/1
250 CAPSULE ORAL DAILY
Status: DISCONTINUED | OUTPATIENT
Start: 2020-03-13 | End: 2020-03-13

## 2020-03-13 RX ORDER — DEXTROSE MONOHYDRATE, SODIUM CHLORIDE, AND POTASSIUM CHLORIDE 50; 1.49; 4.5 G/1000ML; G/1000ML; G/1000ML
INJECTION, SOLUTION INTRAVENOUS CONTINUOUS
Status: DISCONTINUED | OUTPATIENT
Start: 2020-03-13 | End: 2020-03-13

## 2020-03-13 RX ORDER — CEPHALEXIN 250 MG/1
250 CAPSULE ORAL EVERY 8 HOURS
Status: DISCONTINUED | OUTPATIENT
Start: 2020-03-13 | End: 2020-03-13

## 2020-03-13 RX ORDER — NITROFURANTOIN 25; 75 MG/1; MG/1
100 CAPSULE ORAL 2 TIMES DAILY
Status: DISCONTINUED | OUTPATIENT
Start: 2020-03-13 | End: 2020-03-13 | Stop reason: HOSPADM

## 2020-03-13 RX ORDER — ACETAMINOPHEN 325 MG/1
650 TABLET ORAL EVERY 8 HOURS PRN
Status: DISCONTINUED | OUTPATIENT
Start: 2020-03-13 | End: 2020-03-13 | Stop reason: HOSPADM

## 2020-03-13 RX ORDER — ATORVASTATIN CALCIUM 20 MG/1
40 TABLET, FILM COATED ORAL DAILY
Status: DISCONTINUED | OUTPATIENT
Start: 2020-03-13 | End: 2020-03-13 | Stop reason: HOSPADM

## 2020-03-13 RX ORDER — IBUPROFEN 400 MG/1
800 TABLET ORAL EVERY 6 HOURS PRN
Status: DISCONTINUED | OUTPATIENT
Start: 2020-03-13 | End: 2020-03-13 | Stop reason: HOSPADM

## 2020-03-13 RX ORDER — DIPHENHYDRAMINE HCL 25 MG
25 CAPSULE ORAL NIGHTLY PRN
Status: DISCONTINUED | OUTPATIENT
Start: 2020-03-13 | End: 2020-03-13 | Stop reason: HOSPADM

## 2020-03-13 RX ORDER — POLYETHYLENE GLYCOL 3350 17 G/17G
17 POWDER, FOR SOLUTION ORAL ONCE
Status: COMPLETED | OUTPATIENT
Start: 2020-03-13 | End: 2020-03-13

## 2020-03-13 RX ORDER — PANTOPRAZOLE SODIUM 40 MG/1
40 TABLET, DELAYED RELEASE ORAL DAILY
Status: DISCONTINUED | OUTPATIENT
Start: 2020-03-13 | End: 2020-03-13 | Stop reason: HOSPADM

## 2020-03-13 RX ADMIN — LEVOTHYROXINE SODIUM 100 MCG: 100 TABLET ORAL at 06:03

## 2020-03-13 RX ADMIN — CEFAZOLIN 1 G: 1 INJECTION, POWDER, FOR SOLUTION INTRAMUSCULAR; INTRAVENOUS at 05:03

## 2020-03-13 RX ADMIN — ATORVASTATIN CALCIUM 40 MG: 20 TABLET, FILM COATED ORAL at 10:03

## 2020-03-13 RX ADMIN — CEPHALEXIN 250 MG: 250 CAPSULE ORAL at 02:03

## 2020-03-13 RX ADMIN — POLYETHYLENE GLYCOL 3350 17 G: 17 POWDER, FOR SOLUTION ORAL at 10:03

## 2020-03-13 RX ADMIN — PANTOPRAZOLE SODIUM 40 MG: 40 TABLET, DELAYED RELEASE ORAL at 10:03

## 2020-03-13 RX ADMIN — POTASSIUM CHLORIDE, DEXTROSE MONOHYDRATE AND SODIUM CHLORIDE: 150; 5; 450 INJECTION, SOLUTION INTRAVENOUS at 07:03

## 2020-03-13 RX ADMIN — MAGNESIUM SULFATE HEPTAHYDRATE 3 G: 500 INJECTION, SOLUTION INTRAMUSCULAR; INTRAVENOUS at 10:03

## 2020-03-13 RX ADMIN — GABAPENTIN 600 MG: 300 CAPSULE ORAL at 10:03

## 2020-03-13 RX ADMIN — DOCUSATE SODIUM 100 MG: 100 CAPSULE, LIQUID FILLED ORAL at 10:03

## 2020-03-13 RX ADMIN — POTASSIUM & SODIUM PHOSPHATES POWDER PACK 280-160-250 MG 2 PACKET: 280-160-250 PACK at 10:03

## 2020-03-13 RX ADMIN — SODIUM CHLORIDE, SODIUM LACTATE, POTASSIUM CHLORIDE, AND CALCIUM CHLORIDE: .6; .31; .03; .02 INJECTION, SOLUTION INTRAVENOUS at 06:03

## 2020-03-13 RX ADMIN — IBUPROFEN 800 MG: 400 TABLET, FILM COATED ORAL at 02:03

## 2020-03-13 RX ADMIN — ENOXAPARIN SODIUM 40 MG: 100 INJECTION SUBCUTANEOUS at 09:03

## 2020-03-13 NOTE — PLAN OF CARE
Patient to be discharged home.  The patient does not have any home needs.  Family to provide transportation home.  0 follow up appointments requested to be made at discharge.    Future Appointments   Date Time Provider Department Center   3/19/2020  3:00 PM [facility]   4/9/2020 12:10 PM LAB, HEMONC CANCER BLDG [facility] Oli Garcia   6/15/2020 10:00 AM Blanka Blair NP [facility]        03/13/20 1600   Final Note   Assessment Type Final Discharge Note   Anticipated Discharge Disposition Home   Hospital Follow Up  Appt(s) scheduled? No   Discharge plans and expectations educations in teach back method with documentation complete? Yes

## 2020-03-13 NOTE — PROGRESS NOTES
[facility]  General Surgery  Progress Note    Subjective:     History of Present Illness:  No notes on file    Post-Op Info:  * No surgery found *         Interval History: No acute events overnight, afebrile, vitals stable. Patient denies significant abdominal pain. Denies nausea or vomiting yesterday. Tolerated sips of clears.     Medications:  Continuous Infusions:   dextrose 5 % and 0.45 % NaCl with KCl 20 mEq       Scheduled Meds:   atorvastatin  40 mg Oral Daily    ceFAZolin  1 g Intravenous Q8H    docusate sodium  100 mg Oral BID    enoxparin  40 mg Subcutaneous Q24H    gabapentin  600 mg Oral BID    levothyroxine  100 mcg Oral QAM    methocarbamoL  500 mg Oral QHS    pantoprazole  40 mg Oral Daily    polyethylene glycol  17 g Oral Once    potassium, sodium phosphates  2 packet Oral Once     PRN Meds:acetaminophen, Dextrose 10% Bolus, diphenhydrAMINE, glucagon (human recombinant), insulin aspart U-100, morphine, ondansetron, promethazine, sodium chloride 0.9%     Review of patient's allergies indicates:   Allergen Reactions    Dilaudid [hydromorphone (bulk)] Other (See Comments)     Oversedating, head burning. Pt prefers to avoid.       Codeine Nausea And Vomiting     Other reaction(s): Itching    Percocet  [oxycodone-acetaminophen]      Other reaction(s): Itching    Sulfa (sulfonamide antibiotics)      Other reaction(s): Nausea  Other reaction(s): Itching     Objective:     Vital Signs (Most Recent):  Temp: 98.1 °F (36.7 °C) (03/13/20 0452)  Pulse: 94 (03/13/20 0452)  Resp: 18 (03/13/20 0452)  BP: (!) 106/56 (03/13/20 0452)  SpO2: 98 % (03/13/20 0452) Vital Signs (24h Range):  Temp:  [96.4 °F (35.8 °C)-98.1 °F (36.7 °C)] 98.1 °F (36.7 °C)  Pulse:  [] 94  Resp:  [18-20] 18  SpO2:  [92 %-98 %] 98 %  BP: (106-141)/(54-65) 106/56     Weight: 64.6 kg (142 lb 6.7 oz)  Body mass index is 26.05 kg/m².    Intake/Output - Last 3 Shifts       03/11 0700 - 03/12 0659 03/12 0700 -  03/13 0659 03/13 0700 - 03/14 0659    I.V. (mL/kg)  250 (3.9)     Other  30     IV Piggyback 1000 250     Total Intake(mL/kg) 1000 (15.6) 530 (8.2)     Other  400     Total Output  400     Net +1000 +130            Urine Occurrence  2 x           Physical Exam   Constitutional: She appears well-developed and well-nourished.   HENT:   Head: Normocephalic and atraumatic.   Nose: Nose normal.   Eyes: Pupils are equal, round, and reactive to light. Conjunctivae and EOM are normal. No scleral icterus.   Neck: Normal range of motion. No tracheal deviation present.   Cardiovascular: Normal rate and regular rhythm.   Pulmonary/Chest: Effort normal. No respiratory distress.   Abdominal: Soft. She exhibits no distension. There is no tenderness.   Musculoskeletal: Normal range of motion.   Neurological: She is alert.   Skin: Skin is warm and dry. No rash noted.       Significant Labs:  CBC:   Recent Labs   Lab 03/13/20  0608   WBC 4.84   RBC 3.63*   HGB 11.0*   HCT 35.1*   PLT 72*   MCV 97   MCH 30.3   MCHC 31.3*     BMP:   Recent Labs   Lab 03/13/20  0608   *      K 3.9      CO2 27   BUN 5*   CREATININE 0.7   CALCIUM 8.1*   MG 1.6       Significant Diagnostics:  I have reviewed and interpreted all pertinent imaging results/findings within the past 24 hours.    Assessment/Plan:     SBO (small bowel obstruction)  76 yo female wih hx of multiple bowel obstructions who presents with recurrent SBO    - CLD today   - home meds  - strict I/o  - monitor for bowel function  - half mIVFs  - prn zofran/phenergan for nausea  - lovenox, SCDs, TEDs  - SSI, POCT glucose for dm         Kirsten Montgomery MD  General Surgery  [facility]

## 2020-03-13 NOTE — PLAN OF CARE
Cm met with patient and daughter to obtain discharge planning assessment.  Patient is admitted with a sbo.  Planned discharge is home - Plan (A) and (B).    PCP:  Rocio Mc MD     Payor:  Payor: MEDICARE / Plan: MEDICARE PART A & B / Product Type: Government /      Pharmacy:    [id number] - [address] [address]  Phone: [phone] Fax: [phone]       03/13/20 7342   Discharge Assessment   Assessment Type Discharge Planning Assessment   Confirmed/corrected address and phone number on facesheet? Yes   Assessment information obtained from? Patient   Expected Length of Stay (days) 2   Communicated expected length of stay with patient/caregiver yes   Prior to hospitilization cognitive status: Alert/Oriented   Prior to hospitalization functional status: Independent   Current cognitive status: Alert/Oriented   Current Functional Status: Independent   Lives With alone   Able to Return to Prior Arrangements yes   Is patient able to care for self after discharge? Yes   Patient's perception of discharge disposition home or selfcare   Readmission Within the Last 30 Days no previous admission in last 30 days   Patient currently being followed by outpatient case management? No   Patient currently receives any other outside agency services? No   Equipment Currently Used at Home walker, rolling;cane, straight   Do you have any problems affording any of your prescribed medications? No   Is the patient taking medications as prescribed? yes   Does the patient have transportation home? Yes   Transportation Anticipated family or friend will provide   Does the patient receive services at the [facility]? No   Discharge Plan A Home   Discharge Plan B Home   DME Needed Upon Discharge  none   Patient/Family in Agreement with Plan yes

## 2020-03-13 NOTE — PT/OT/SLP EVAL
"Physical Therapy Evaluation and Discharge Note    Patient Name:  Anayeli Judd   MRN:  [id number]    Recommendations:     Discharge Recommendations:  outpatient PT   Discharge Equipment Recommendations: none   Barriers to discharge: None    Assessment:     Anayeli Judd is a 75 y.o. female admitted with a medical diagnosis of SBO (small bowel obstruction).     Upon evaluation, pt is I with all functional mobility assessed, without use of AD for support.  Pt does have a history of falls, 2 in the past year.  Therefore recommending PT services in the outpatient setting to address balance deficits and improve pt safety, reducing the risk for falls.  At this time, patient is functioning at their prior level of function and does not require further acute PT services.     Recent Surgery: * No surgery found *      Plan:     During this hospitalization, patient does not require further acute PT services.  Please re-consult if situation changes.      Subjective     Chief Complaint: Boredom  Patient/Family Comments/goals: "Just when I don't feel well." re: use of AD  Pain/Comfort:  · Pain Rating 1: 0/10    Patients cultural, spiritual, Bahai conflicts given the current situation: no    Living Environment:  Pt lives alone, condo, 0 [address] with elevator access  Prior to admission, patients level of function was I with ADLs, requires use of Rollator approx 10%, use of cane outdoors for longer distances.  Equipment used at home: rollator, cane, straight, bedside commode, shower chair.  DME owned (not currently used): wheelchair.  Upon discharge, patient will have assistance from daughter.    Objective:     Communicated with nursing prior to session.  Patient found up in chair with peripheral IV, telemetry upon PT entry to room.    General Precautions: Standard, fall   Orthopedic Precautions:N/A   Braces: N/A     Exams:  · Cognitive Exam:  Patient is oriented to Person, Place, Time and Situation  · Fine Motor Coordination: "    · -       Intact  Left hand thumb/finger opposition skills and Right hand thumb/finger opposition skills  · Gross Motor Coordination:  WFL  · Postural Exam:  Patient presented with the following abnormalities:    · -       No postural abnormalities identified  · Sensation:    · -       Intact  · Skin Integrity/Edema:      · -       Edema: None noted B LEs  · RUE ROM: WFL  · RUE Strength: WFL  · LUE ROM: WFL  · LUE Strength: WFL  · RLE ROM: WFL  · RLE Strength: WFL  · LLE ROM: WFL  · LLE Strength: WFL    Functional Mobility:  · Transfers:     · Sit to Stand:  independence with no AD  · Bed to Chair: independence with  no AD  using  Stand Pivot  · Gait: Pt amb 324', I, without AD, noted decreased trunk rotation with tactile cuing to facilitate movement, decreased step length with demo and verbal cuing to improve  · Balance: I: dynamic standing balance without AD    AM-[facility] 6 CLICK MOBILITY  Total Score:24       Therapeutic Activities and Exercises:   Whiteboard updated    AM-[facility] 6 CLICK MOBILITY  Total Score:24     Patient left up in chair with all lines intact, call button in reach and daughter present.    GOALS:   Multidisciplinary Problems     Physical Therapy Goals     Not on file                History:     Past Medical History:   Diagnosis Date    Abdominal pain     Allergic rhinitis     Arthritis     Blood transfusion     during delivery and     Bowel obstruction     Cervical radiculopathy     followed by dr cloud    Colon cancer     transverse colon; resected; Stage IIA (pT3 pN0 MX)    Diarrhea     Family history of breast cancer     Family history of colon cancer     Fatty liver     GERD (gastroesophageal reflux disease)     History of shingles     Hyperlipidemia     Hypothyroidism     Irritable bowel syndrome     Microscopic colitis     treated 2013    Raynaud phenomenon     Raynaud's disease     Type 2 diabetes mellitus        Past Surgical History:   Procedure Laterality  Date    APPENDECTOMY      BACK SURGERY      CARPAL TUNNEL RELEASE      bilateral      SECTION      CHOLECYSTECTOMY  1965    COLECTOMY  2011    Transverse colon resection by Dr. Aguirre    COLONOSCOPY N/A 2017    Procedure: COLONOSCOPY;  Surgeon: Manjit Alvarez MD;  Location: [facility] (4TH FLR);  Service: Endoscopy;  Laterality: N/A;    COLONOSCOPY N/A 2019    Procedure: COLONOSCOPY;  Surgeon: Ramiro Jefferson MD;  Location: [facility] (4TH FLR);  Service: Endoscopy;  Laterality: N/A;  PM prep    ENDOSCOPIC ULTRASOUND OF UPPER GASTROINTESTINAL TRACT N/A 2018    Procedure: ULTRASOUND, UPPER GI TRACT, ENDOSCOPIC;  Surgeon: Jose Hess MD;  Location: [facility] ENDO;  Service: Endoscopy;  Laterality: N/A;    ENDOSCOPIC ULTRASOUND OF UPPER GASTROINTESTINAL TRACT N/A 2019    Procedure: ULTRASOUND, UPPER GI TRACT, ENDOSCOPIC;  Surgeon: Jose Hess MD;  Location: [facility] (2ND FLR);  Service: Endoscopy;  Laterality: N/A;    ESOPHAGOGASTRODUODENOSCOPY N/A 2018    Procedure: EGD (ESOPHAGOGASTRODUODENOSCOPY);  Surgeon: Angelo Reynolds MD;  Location: [facility] ENDO (2ND FLR);  Service: Endoscopy;  Laterality: N/A;    ESOPHAGOGASTRODUODENOSCOPY N/A 2019    Procedure: EGD (ESOPHAGOGASTRODUODENOSCOPY);  Surgeon: Ramiro Jefferson MD;  Location: [facility] (4TH FLR);  Service: Endoscopy;  Laterality: N/A;    EYE SURGERY      Cataract Removal    HYSTERECTOMY      posterolateral fusion with autograft bone and Eun mineralized bone matrix  13    at [facility] for lumbar spine stenosis    TOE SURGERY      TONSILLECTOMY      TRIGGER FINGER RELEASE         Time Tracking:     PT Received On: 20  PT Start Time: 1212     PT Stop Time: 1232  PT Total Time (min): 20 min     Billable Minutes: Evaluation 7 and Gait Training 12      Radha Garcia, PT  2020

## 2020-03-13 NOTE — ASSESSMENT & PLAN NOTE
74 yo female wih hx of multiple bowel obstructions who presents with recurrent SBO    - CLD today   - home meds  - strict I/o  - monitor for bowel function  - half mIVFs  - prn zofran/phenergan for nausea  - lovenox, SCDs, TEDs  - SSI, POCT glucose for dm

## 2020-03-13 NOTE — PT/OT/SLP EVAL
Occupational Therapy   Evaluation and Discharge Note    Name: Anayeli Judd  MRN: [id number]  Admitting Diagnosis:  <principal problem not specified>      Recommendations:     Discharge Recommendations: home  Discharge Equipment Recommendations:  none  Barriers to discharge:  None    Assessment:     Anayeli Judd is a 75 y.o. female with a medical diagnosis of <principal problem not specified>. At this time, patient is functioning at their prior level of function and does not require further acute OT services.     Plan:     During this hospitalization, patient does not require further acute OT services.  Please re-consult if situation changes.    · Plan of Care Reviewed with: patient, daughter    Subjective     Chief Complaint: decreased endurance for functional tasks   Patient/Family Comments/goals: return to home and PLOF     Occupational Profile:  Living Environment: Pt lives alone in 1 story house with no steps to enter (1 daughter in residence on property, 1 daughter within 20 min and 1 daughter in [place], [place])  Previous level of function: Pt was indep with self care an community access prior to admission   Roles and Routines:    Equipment Used at home:  walker, rolling, bedside commode, shower chair  Assistance upon Discharge: daughters able to assist     Pain/Comfort:  ·      Patients cultural, spiritual, Worship conflicts given the current situation: no    Objective:     Communicated with: RN prior to session.  Patient found supine with peripheral IV, telemetry, FCD upon OT entry to room.    General Precautions: Standard, fall   Orthopedic Precautions:N/A   Braces: N/A     Occupational Performance:    Bed Mobility:    · Pt indep with bed mobility     Functional Mobility/Transfers:  · Functional Mobility: Pt able demonstrates decreased endurance for indep in room access and benefits from close supervision during acute stay for mobility     Activities of Daily Living:  · Pt able to complete self  care tasks in room     Cognitive/Visual Perceptual:  Cognitive/Psychosocial Skills:     -       Oriented to: Person, Place, Time and Situation   -       Follows Commands/attention:Follows two-step commands  -       Communication: clear/fluent  -       Memory: No Deficits noted  -       Safety awareness/insight to disability: intact   -       Mood/Affect/Coping skills/emotional control: Appropriate to situation    Physical Exam:  Upper Extremity Range of Motion:     -       Right Upper Extremity: WFL  -       Left Upper Extremity: WFL  Upper Extremity Strength:    -       Right Upper Extremity: WFL  -       Left Upper Extremity: WFL    AMPAC 6 Click ADL:  AMPAC Total Score: 24    Treatment & Education:  Evaluation complete and goals set.  Cont with POC  - Pt educated on safety, role of OT, importance of increased participation in self care for gains , expectations for participation, expectations for gains, POC, energy conservation, caregiver strain. White board updated.   - ADL transfer training for safety   Education:    Patient left up in chair with all lines intact and daughter present    GOALS:   Multidisciplinary Problems     Occupational Therapy Goals        Problem: Occupational Therapy Goal    Goal Priority Disciplines Outcome Interventions   Occupational Therapy Goal     OT, PT/OT                     History:     Past Medical History:   Diagnosis Date    Abdominal pain     Allergic rhinitis     Arthritis     Blood transfusion     during delivery and     Bowel obstruction     Cervical radiculopathy     followed by dr cloud    Colon cancer     transverse colon; resected; Stage IIA (pT3 pN0 MX)    Diarrhea     Family history of breast cancer     Family history of colon cancer     Fatty liver     GERD (gastroesophageal reflux disease)     History of shingles     Hyperlipidemia     Hypothyroidism     Irritable bowel syndrome     Microscopic colitis     treated     Raynaud  phenomenon     Raynaud's disease     Type 2 diabetes mellitus        Past Surgical History:   Procedure Laterality Date    APPENDECTOMY      BACK SURGERY      CARPAL TUNNEL RELEASE      bilateral      SECTION      CHOLECYSTECTOMY  1965    COLECTOMY  2011    Transverse colon resection by Dr. Aguirre    COLONOSCOPY N/A 2017    Procedure: COLONOSCOPY;  Surgeon: Manjit Alvarez MD;  Location: [facility] (4TH FLR);  Service: Endoscopy;  Laterality: N/A;    COLONOSCOPY N/A 2019    Procedure: COLONOSCOPY;  Surgeon: Ramiro Jefferson MD;  Location: [facility] (4TH FLR);  Service: Endoscopy;  Laterality: N/A;  PM prep    ENDOSCOPIC ULTRASOUND OF UPPER GASTROINTESTINAL TRACT N/A 2018    Procedure: ULTRASOUND, UPPER GI TRACT, ENDOSCOPIC;  Surgeon: Jose Hess MD;  Location: [facility] ENDO;  Service: Endoscopy;  Laterality: N/A;    ENDOSCOPIC ULTRASOUND OF UPPER GASTROINTESTINAL TRACT N/A 2019    Procedure: ULTRASOUND, UPPER GI TRACT, ENDOSCOPIC;  Surgeon: Jose Hess MD;  Location: [facility] (2ND FLR);  Service: Endoscopy;  Laterality: N/A;    ESOPHAGOGASTRODUODENOSCOPY N/A 2018    Procedure: EGD (ESOPHAGOGASTRODUODENOSCOPY);  Surgeon: Angelo Reynolds MD;  Location: [facility] (2ND FLR);  Service: Endoscopy;  Laterality: N/A;    ESOPHAGOGASTRODUODENOSCOPY N/A 2019    Procedure: EGD (ESOPHAGOGASTRODUODENOSCOPY);  Surgeon: Ramiro Jefferson MD;  Location: [facility] (4TH FLR);  Service: Endoscopy;  Laterality: N/A;    EYE SURGERY      Cataract Removal    HYSTERECTOMY      posterolateral fusion with autograft bone and Eun mineralized bone matrix  13    at [facility] for lumbar spine stenosis    TOE SURGERY      TONSILLECTOMY      TRIGGER FINGER RELEASE         Time Tracking:     OT Date of Treatment: 20  OT Start Time: 0940  OT Stop Time: 1000  OT Total Time (min): 20 min    Billable Minutes:Evaluation 10  Therapeutic Activity 10    Erika Diamond  OT  3/13/2020

## 2020-03-13 NOTE — DISCHARGE SUMMARY
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 She has been worked up for chronic abdominal pain and diarrhea. Was seen by GI (Dr. Alvarez) in February with similar symptoms. Had CT scan at that time showing thickening and inflammatory changes of the cecum and right colon - she was prescribed a course of oral antibiotics. She also has history of chronic UTI - currently on keflex daily.      Her SBO history includes ex lap with Dr. Silvestre in 2014 - had SBRx2 and enterolysis that failed with conservative management. Then again in 2017, underwent ex lap with Dr. Ward. Had small bowel repair x 14 and placement of G tube. She had dense adhesions that required lysis for >2 hours. Her last episode that she was hospitalized was in 05/2018 - treated conservatively.      Began to have nausea this morning but has had no episodes of emesis. Passed flatus this morning. Multiple episodes of diarrhea (non bloody) - is her baseline. Last BM was this morning. Family at bedside. Her sister also tells me that she has been developing dementia.      Last colonoscopy was 07/2017 - small tubular adenomas removed from transverse colon.  Colonic biopsies negative for microscopic colitis.      Also endorses recent vaginal bleeding and underwent biopsy a weeks ago.     * No surgery found *     Hospital Course: Patient admitted given history as above. CT scan  demonstrated concern for possible SBO. Patient was managed conservatively with NGT to LIWS and repeat xray demonstrated contrast in the colon. Diet was then slowly advanced and well tolerated. She experienced return of bowel function. Given this she was deemed stable for discharge home with follow up as needed.    Consults:   Consults (From admission, onward)        Status Ordering Provider     Inpatient consult to General surgery  Once     Provider:  (Not yet assigned)    Completed M[facility] [place]          Significant Diagnostic Studies: Labs:   BMP:   Recent Labs   Lab 03/11/20  1643 03/12/20  0357 03/13/20  0608   * 122* 128*   * 137 140   K 5.4* 4.2 3.9   CL 99 103 105   CO2 20* 27 27   BUN 10 8 5*   CREATININE 0.9 0.7 0.7   CALCIUM 9.4 8.3* 8.1*   MG  --  1.3* 1.6    and CBC   Recent Labs   Lab 03/11/20  1643  03/12/20  0357 03/13/20  0608   WBC 10.11  --  6.38 4.84   HGB 13.5  --  11.4* 11.0*   HCT 42.0   < > 35.6* 35.1*   PLT 98*  --  75* 72*    < > = values in this interval not displayed.       Pending Diagnostic Studies:     None        Final Active Diagnoses:    Diagnosis Date Noted POA    PRINCIPAL PROBLEM:  SBO (small bowel obstruction) [K56.609] 03/11/2020 Yes      Problems Resolved During this Admission:      Discharged Condition: good    Disposition: Home or Self Care    Follow Up:  Follow-up Information     Please follow up.    Why:  As needed               Patient Instructions:      Notify your health care provider if you experience any of the following:  increased confusion or weakness     Notify your health care provider if you experience any of the following:  persistent dizziness, light-headedness, or visual disturbances     Notify your health care provider if you experience any of the following:  worsening rash     Notify your health care provider if you experience any of the following:  severe persistent headache     Notify your health care provider if you experience any of the  following:  difficulty breathing or increased cough     Notify your health care provider if you experience any of the following:  redness, tenderness, or signs of infection (pain, swelling, redness, odor or green/yellow discharge around incision site)     Notify your health care provider if you experience any of the following:  severe uncontrolled pain     Notify your health care provider if you experience any of the following:  persistent nausea and vomiting or diarrhea     Notify your health care provider if you experience any of the following:  temperature >100.4     Activity as tolerated     Medications:  Reconciled Home Medications:      Medication List      CONTINUE taking these medications    acetaZOLAMIDE 250 MG tablet  Commonly known as:  DIAMOX  Take 1 tablet (250 mg total) by mouth 3 (three) times daily.     atorvastatin 40 MG tablet  Commonly known as:  LIPITOR  TAKE 1 TABLET(40 MG) BY MOUTH EVERY DAY     azelastine 137 mcg (0.1 %) nasal spray  Commonly known as:  ASTELIN  1 spray (137 mcg total) by Nasal route 2 (two) times daily.     betamethasone dipropionate 0.05 % ointment  Commonly known as:  DIPROLENE  Apply every Am x 2 weeks then twice weekly     blood sugar diagnostic Strp  Dispense [employer] contour strips; test blood sugar once daily     blood-glucose meter kit  Commonly known as:  CONTOUR METER  Please dispense [employer] Contour meter and supplies Use as instructed Test Blood sugar once daily     cephALEXin 250 MG capsule  Commonly known as:  KEFLEX  Take 1 capsule (250 mg total) by mouth once daily.     cholestyramine-aspartame 4 gram Powd  Commonly known as:  QUESTRAN/PREVALITE LIGHT  Take 4 g by mouth once daily.     diphenhydrAMINE 25 mg capsule  Commonly known as:  BENADRYL  Take 1 each (25 mg total) by mouth nightly as needed for Itching, Allergies or Insomnia.     docusate sodium 100 MG capsule  Commonly known as:  COLACE  Take 1 capsule (100 mg total) by mouth 2 (two) times daily.      DULoxetine 30 MG capsule  Commonly known as:  CYMBALTA     fluticasone propionate 50 mcg/actuation nasal spray  Commonly known as:  FLONASE  2 sprays by Each Nare route daily as needed.     FREESTYLE EFREN 10 DAY READER Misc  Generic drug:  flash glucose scanning reader  TEST as directed     FREESTYLE EFREN 10 DAY SENSOR Kit  Generic drug:  flash glucose sensor  Check blood glucose every day. Please dispense 6 mos supply.     gabapentin 600 MG tablet  Commonly known as:  NEURONTIN  Take 1,200 mg by mouth 2 (two) times daily.     hydrocortisone 2.5 % cream     Lactobacillus rhamnosus GG 10 billion cell capsule  Commonly known as:  CULTURELLE  Take 1 capsule by mouth once daily.     lancets Misc  Commonly known as:  ONETOUCH ULTRASOFT LANCETS  Test blood sugar once daily     levothyroxine 100 MCG tablet  Commonly known as:  SYNTHROID  TAKE 1 TABLET BY MOUTH EVERY MORNING     lidocaine 5 %  Commonly known as:  LIDODERM     linaGLIPtin 5 mg Tab tablet  Commonly known as:  TRADJENTA  Take 1 tablet (5 mg total) by mouth once daily.     magnesium 30 mg Tab  Take 500 capsules by mouth. Patient's taking magnesium 500mg QD     metFORMIN 750 MG XR 24hr tablet  Commonly known as:  GLUCOPHAGE-XR  TAKE 1 TABLET(750 MG) BY MOUTH TWICE DAILY WITH MEALS     methocarbamoL 500 MG Tab  Commonly known as:  ROBAXIN  Take 500 mg by mouth every evening.     mirabegron 50 mg Tb24  Commonly known as:  MYRBETRIQ  Take 1 tablet (50 mg total) by mouth once daily.     mupirocin 2 % ointment  Commonly known as:  BACTROBAN  Apply topically 3 (three) times daily.     nitrofurantoin (macrocrystal-monohydrate) 100 MG capsule  Commonly known as:  MACROBID  Take 1 capsule (100 mg total) by mouth 2 (two) times daily.     ondansetron 4 MG tablet  Commonly known as:  ZOFRAN  Take 1 tablet (4 mg total) by mouth every 8 (eight) hours as needed for Nausea.     ondansetron 8 MG Tbdl  Commonly known as:  ZOFRAN-ODT     ospemifene 60 mg Tab  Commonly known  as:  OSPHENA  Take 60 mg by mouth once daily.     pantoprazole 40 MG tablet  Commonly known as:  PROTONIX  Take 1 tablet (40 mg total) by mouth once daily.     polyethylene glycol 17 gram/dose powder  Commonly known as:  GLYCOLAX  Take 17 g by mouth once daily.     PREMARIN vaginal cream  Generic drug:  conjugated estrogens  PLACE 0.5 GRAM VAGINALLY EVERY EVENING     traMADoL 50 mg tablet  Commonly known as:  ULTRAM     triamcinolone acetonide 0.1% 0.1 % paste  Commonly known as:  KENALOG  apply to affected area with A Q-TIP three times a day     vitamin D 1000 units Tab  Commonly known as:  VITAMIN D3  Take 185 mg by mouth once daily. D3            Kirsten Montgomery MD  General Surgery  [facility]

## 2020-03-13 NOTE — PLAN OF CARE
POC reviewed with patient and daughter.  Patient complained of lower back pain, prn tylenol and morphine given.  Patient states pain level dropped from an 8 to a 3.  VS stable.  IVF infusing.  Patient has not had a bowel movement overnight.  No reports of nausea.  Patient able to tolerate ice chips nd sips of water with meds.  NAD noted.

## 2020-03-13 NOTE — ED NOTES
NG tube removed. Pt tolerated well. Denies nausea and pain at this moment. Pt informed to call nurse for nausea, pain, or bloating. Pt aware of plan for admission. Will continue to monitor.

## 2020-03-13 NOTE — PLAN OF CARE
Road Test  Oxygen-Patient tolerating room air, no distress.  Ambulation-Patient up with no assistance.  Devices-Patient going home with no devices.  Tolerating-Regular diet.  Elimination-Patient voiding without difficulty.  Self Care-Performs self care without assistance.  Teaching-Verbal and written discharge teaching given.    Patient tolerates room air, ambulates with no assistance, regular diet, voiding without difficulty, and is going home with no devices. Peripheral IV removed, catheter intact, dressing dry gauze and coban. No bleeding present. Patient going home. Discharge paperwork discussed and medications reviewed. Patient has all belongings and no questions at this time.

## 2020-03-13 NOTE — SUBJECTIVE & OBJECTIVE
Interval History: No acute events overnight, afebrile, vitals stable. Patient denies significant abdominal pain. Denies nausea or vomiting yesterday. Tolerated sips of clears.     Medications:  Continuous Infusions:   dextrose 5 % and 0.45 % NaCl with KCl 20 mEq       Scheduled Meds:   atorvastatin  40 mg Oral Daily    ceFAZolin  1 g Intravenous Q8H    docusate sodium  100 mg Oral BID    enoxparin  40 mg Subcutaneous Q24H    gabapentin  600 mg Oral BID    levothyroxine  100 mcg Oral QAM    methocarbamoL  500 mg Oral QHS    pantoprazole  40 mg Oral Daily    polyethylene glycol  17 g Oral Once    potassium, sodium phosphates  2 packet Oral Once     PRN Meds:acetaminophen, Dextrose 10% Bolus, diphenhydrAMINE, glucagon (human recombinant), insulin aspart U-100, morphine, ondansetron, promethazine, sodium chloride 0.9%     Review of patient's allergies indicates:   Allergen Reactions    Dilaudid [hydromorphone (bulk)] Other (See Comments)     Oversedating, head burning. Pt prefers to avoid.       Codeine Nausea And Vomiting     Other reaction(s): Itching    Percocet  [oxycodone-acetaminophen]      Other reaction(s): Itching    Sulfa (sulfonamide antibiotics)      Other reaction(s): Nausea  Other reaction(s): Itching     Objective:     Vital Signs (Most Recent):  Temp: 98.1 °F (36.7 °C) (03/13/20 0452)  Pulse: 94 (03/13/20 0452)  Resp: 18 (03/13/20 0452)  BP: (!) 106/56 (03/13/20 0452)  SpO2: 98 % (03/13/20 0452) Vital Signs (24h Range):  Temp:  [96.4 °F (35.8 °C)-98.1 °F (36.7 °C)] 98.1 °F (36.7 °C)  Pulse:  [] 94  Resp:  [18-20] 18  SpO2:  [92 %-98 %] 98 %  BP: (106-141)/(54-65) 106/56     Weight: 64.6 kg (142 lb 6.7 oz)  Body mass index is 26.05 kg/m².    Intake/Output - Last 3 Shifts       03/11 0700 - 03/12 0659 03/12 0700 - 03/13 0659 03/13 0700 - 03/14 0659    I.V. (mL/kg)  250 (3.9)     Other  30     IV Piggyback 1000 250     Total Intake(mL/kg) 1000 (15.6) 530 (8.2)     Other  400     Total  Output  400     Net +1000 +130            Urine Occurrence  2 x           Physical Exam   Constitutional: She appears well-developed and well-nourished.   HENT:   Head: Normocephalic and atraumatic.   Nose: Nose normal.   Eyes: Pupils are equal, round, and reactive to light. Conjunctivae and EOM are normal. No scleral icterus.   Neck: Normal range of motion. No tracheal deviation present.   Cardiovascular: Normal rate and regular rhythm.   Pulmonary/Chest: Effort normal. No respiratory distress.   Abdominal: Soft. She exhibits no distension. There is no tenderness.   Musculoskeletal: Normal range of motion.   Neurological: She is alert.   Skin: Skin is warm and dry. No rash noted.       Significant Labs:  CBC:   Recent Labs   Lab 03/13/20  0608   WBC 4.84   RBC 3.63*   HGB 11.0*   HCT 35.1*   PLT 72*   MCV 97   MCH 30.3   MCHC 31.3*     BMP:   Recent Labs   Lab 03/13/20  0608   *      K 3.9      CO2 27   BUN 5*   CREATININE 0.7   CALCIUM 8.1*   MG 1.6       Significant Diagnostics:  I have reviewed and interpreted all pertinent imaging results/findings within the past 24 hours.

## 2020-03-19 ENCOUNTER — INFUSION (OUTPATIENT)
Dept: INFUSION THERAPY | Facility: HOSPITAL | Age: 75
End: 2020-03-19
Attending: NURSE PRACTITIONER
Payer: MEDICARE

## 2020-03-19 VITALS
DIASTOLIC BLOOD PRESSURE: 62 MMHG | SYSTOLIC BLOOD PRESSURE: 115 MMHG | RESPIRATION RATE: 16 BRPM | TEMPERATURE: 98 F | HEART RATE: 67 BPM

## 2020-03-19 DIAGNOSIS — D50.0 IRON DEFICIENCY ANEMIA DUE TO CHRONIC BLOOD LOSS: Primary | ICD-10-CM

## 2020-03-19 LAB — FINAL PATHOLOGIC DIAGNOSIS: NORMAL

## 2020-03-19 PROCEDURE — 63600175 PHARM REV CODE 636 W HCPCS: Performed by: PHYSICIAN ASSISTANT

## 2020-03-19 PROCEDURE — 96374 THER/PROPH/DIAG INJ IV PUSH: CPT

## 2020-03-19 RX ORDER — HEPARIN 100 UNIT/ML
500 SYRINGE INTRAVENOUS
Status: DISCONTINUED | OUTPATIENT
Start: 2020-03-19 | End: 2020-03-19 | Stop reason: HOSPADM

## 2020-03-19 RX ORDER — SODIUM CHLORIDE 0.9 % (FLUSH) 0.9 %
10 SYRINGE (ML) INJECTION
Status: DISCONTINUED | OUTPATIENT
Start: 2020-03-19 | End: 2020-03-19 | Stop reason: HOSPADM

## 2020-03-19 RX ADMIN — SODIUM CHLORIDE: 9 INJECTION, SOLUTION INTRAVENOUS at 01:03

## 2020-03-19 RX ADMIN — FERRIC CARBOXYMALTOSE INJECTION 750 MG: 50 INJECTION, SOLUTION INTRAVENOUS at 01:03

## 2020-03-19 NOTE — PLAN OF CARE
Patient arrived for clinic for 2nd dose of injectafer. Patient had mild nausea after infusion completed. Cold pack applied. VSS. Patient observed for 30 min post infusion. Patient states nausea had resolved. PIV removed, catheter tip intact. Declined avs. Discharged home, ambulated independently.

## 2020-03-19 NOTE — PLAN OF CARE
Problem: Adult Inpatient Plan of Care  Goal: Optimal Comfort and Wellbeing  Intervention: Provide Person-Centered Care  Flowsheets (Taken 3/19/2020 1630)  Trust Relationship/Rapport: thoughts/feelings acknowledged; care explained; choices provided; emotional support provided; empathic listening provided; questions answered; questions encouraged; reassurance provided

## 2020-03-26 ENCOUNTER — TELEPHONE (OUTPATIENT)
Dept: UROGYNECOLOGY | Facility: CLINIC | Age: 75
End: 2020-03-26

## 2020-03-26 ENCOUNTER — PATIENT MESSAGE (OUTPATIENT)
Dept: UROGYNECOLOGY | Facility: CLINIC | Age: 75
End: 2020-03-26

## 2020-03-26 NOTE — TELEPHONE ENCOUNTER
----- Message from Amanda Amos sent at 3/26/2020 11:53 AM CDT -----  Contact: pt  Pt was returning phone call   Call back - [phone]

## 2020-03-26 NOTE — TELEPHONE ENCOUNTER
Returned pt call no answer, Left voice message for pt to give the office a call back at [phone].

## 2020-04-03 DIAGNOSIS — N95.2 VAGINAL ATROPHY: ICD-10-CM

## 2020-04-03 RX ORDER — CONJUGATED ESTROGENS 0.62 MG/G
CREAM VAGINAL
Qty: 30 G | Refills: 3 | Status: SHIPPED | OUTPATIENT
Start: 2020-04-03 | End: 2020-11-04 | Stop reason: SDUPTHER

## 2020-04-09 ENCOUNTER — LAB VISIT (OUTPATIENT)
Dept: LAB | Facility: HOSPITAL | Age: 75
End: 2020-04-09
Payer: MEDICARE

## 2020-04-09 DIAGNOSIS — D50.0 IRON DEFICIENCY ANEMIA DUE TO CHRONIC BLOOD LOSS: ICD-10-CM

## 2020-04-09 LAB
FERRITIN SERPL-MCNC: 1656 NG/ML (ref 20–300)
IRON SERPL-MCNC: 191 UG/DL (ref 30–160)
SATURATED IRON: 49 % (ref 20–50)
TOTAL IRON BINDING CAPACITY: 391 UG/DL (ref 250–450)
TRANSFERRIN SERPL-MCNC: 264 MG/DL (ref 200–375)

## 2020-04-09 PROCEDURE — 36415 COLL VENOUS BLD VENIPUNCTURE: CPT

## 2020-04-09 PROCEDURE — 82728 ASSAY OF FERRITIN: CPT

## 2020-04-09 PROCEDURE — 83540 ASSAY OF IRON: CPT

## 2020-05-07 ENCOUNTER — LAB VISIT (OUTPATIENT)
Dept: LAB | Facility: HOSPITAL | Age: 75
End: 2020-05-07
Payer: MEDICARE

## 2020-05-07 DIAGNOSIS — E78.5 HYPERLIPIDEMIA, UNSPECIFIED HYPERLIPIDEMIA TYPE: ICD-10-CM

## 2020-05-07 DIAGNOSIS — E11.9 TYPE 2 DIABETES MELLITUS WITHOUT COMPLICATION, WITHOUT LONG-TERM CURRENT USE OF INSULIN: ICD-10-CM

## 2020-05-07 DIAGNOSIS — E03.4 HYPOTHYROIDISM DUE TO ACQUIRED ATROPHY OF THYROID: ICD-10-CM

## 2020-05-07 LAB
ALBUMIN SERPL BCP-MCNC: 3.9 G/DL (ref 3.5–5.2)
ALP SERPL-CCNC: 72 U/L (ref 55–135)
ALT SERPL W/O P-5'-P-CCNC: 48 U/L (ref 10–44)
ANION GAP SERPL CALC-SCNC: 8 MMOL/L (ref 8–16)
AST SERPL-CCNC: 35 U/L (ref 10–40)
BASOPHILS # BLD AUTO: 0.03 K/UL (ref 0–0.2)
BASOPHILS NFR BLD: 0.6 % (ref 0–1.9)
BILIRUB SERPL-MCNC: 1.1 MG/DL (ref 0.1–1)
BUN SERPL-MCNC: 12 MG/DL (ref 8–23)
CALCIUM SERPL-MCNC: 9.5 MG/DL (ref 8.7–10.5)
CHLORIDE SERPL-SCNC: 103 MMOL/L (ref 95–110)
CO2 SERPL-SCNC: 26 MMOL/L (ref 23–29)
CREAT SERPL-MCNC: 1.1 MG/DL (ref 0.5–1.4)
DIFFERENTIAL METHOD: ABNORMAL
EOSINOPHIL # BLD AUTO: 0.2 K/UL (ref 0–0.5)
EOSINOPHIL NFR BLD: 4.1 % (ref 0–8)
ERYTHROCYTE [DISTWIDTH] IN BLOOD BY AUTOMATED COUNT: 13.8 % (ref 11.5–14.5)
EST. GFR  (AFRICAN AMERICAN): 56 ML/MIN/1.73 M^2
EST. GFR  (NON AFRICAN AMERICAN): 48.5 ML/MIN/1.73 M^2
ESTIMATED AVG GLUCOSE: 140 MG/DL (ref 68–131)
FERRITIN SERPL-MCNC: 1651 NG/ML (ref 20–300)
GLUCOSE SERPL-MCNC: 266 MG/DL (ref 70–110)
HBA1C MFR BLD HPLC: 6.5 % (ref 4–5.6)
HCT VFR BLD AUTO: 41.1 % (ref 37–48.5)
HGB BLD-MCNC: 12.8 G/DL (ref 12–16)
IMM GRANULOCYTES # BLD AUTO: 0.01 K/UL (ref 0–0.04)
IMM GRANULOCYTES NFR BLD AUTO: 0.2 % (ref 0–0.5)
LYMPHOCYTES # BLD AUTO: 1.3 K/UL (ref 1–4.8)
LYMPHOCYTES NFR BLD: 26.4 % (ref 18–48)
MCH RBC QN AUTO: 32 PG (ref 27–31)
MCHC RBC AUTO-ENTMCNC: 31.1 G/DL (ref 32–36)
MCV RBC AUTO: 103 FL (ref 82–98)
MONOCYTES # BLD AUTO: 0.5 K/UL (ref 0.3–1)
MONOCYTES NFR BLD: 10.3 % (ref 4–15)
NEUTROPHILS # BLD AUTO: 2.8 K/UL (ref 1.8–7.7)
NEUTROPHILS NFR BLD: 58.4 % (ref 38–73)
NRBC BLD-RTO: 0 /100 WBC
PLATELET # BLD AUTO: 89 K/UL (ref 150–350)
PMV BLD AUTO: 11.3 FL (ref 9.2–12.9)
POTASSIUM SERPL-SCNC: 4.5 MMOL/L (ref 3.5–5.1)
PROT SERPL-MCNC: 7.4 G/DL (ref 6–8.4)
RBC # BLD AUTO: 4 M/UL (ref 4–5.4)
SODIUM SERPL-SCNC: 137 MMOL/L (ref 136–145)
T4 FREE SERPL-MCNC: 1.15 NG/DL (ref 0.71–1.51)
TSH SERPL DL<=0.005 MIU/L-ACNC: 0.14 UIU/ML (ref 0.4–4)
WBC # BLD AUTO: 4.85 K/UL (ref 3.9–12.7)

## 2020-05-07 PROCEDURE — 83036 HEMOGLOBIN GLYCOSYLATED A1C: CPT

## 2020-05-07 PROCEDURE — 84439 ASSAY OF FREE THYROXINE: CPT

## 2020-05-07 PROCEDURE — 36415 COLL VENOUS BLD VENIPUNCTURE: CPT | Mod: PN

## 2020-05-07 PROCEDURE — 85025 COMPLETE CBC W/AUTO DIFF WBC: CPT

## 2020-05-07 PROCEDURE — 84443 ASSAY THYROID STIM HORMONE: CPT

## 2020-05-07 PROCEDURE — 80053 COMPREHEN METABOLIC PANEL: CPT

## 2020-05-07 PROCEDURE — 82728 ASSAY OF FERRITIN: CPT

## 2020-05-08 RX ORDER — CEPHALEXIN 250 MG/1
CAPSULE ORAL
Qty: 30 CAPSULE | Refills: 0 | Status: SHIPPED | OUTPATIENT
Start: 2020-05-08 | End: 2020-07-01

## 2020-05-14 ENCOUNTER — TELEPHONE (OUTPATIENT)
Dept: UROGYNECOLOGY | Facility: CLINIC | Age: 75
End: 2020-05-14

## 2020-05-14 NOTE — TELEPHONE ENCOUNTER
LMOR - Requested patient call ([phone]) to make an appointment with Zohra AC on 5/15/20 at 3:30 pm.  Call ended

## 2020-05-14 NOTE — TELEPHONE ENCOUNTER
----- Message from Canelo Kamara sent at 5/14/2020 11:53 AM CDT -----  Dr. Jesusita Gold would like to schedule the following pt for an earlier appt due to constant vaginal bleeding. If possible, please contact the pt with an appt time and date.     Thank you!

## 2020-05-14 NOTE — TELEPHONE ENCOUNTER
----- Message from Nestor Carmona sent at 5/14/2020  2:29 PM CDT -----  Pt returning your call [phone]

## 2020-05-15 ENCOUNTER — OFFICE VISIT (OUTPATIENT)
Dept: UROGYNECOLOGY | Facility: CLINIC | Age: 75
End: 2020-05-15
Payer: MEDICARE

## 2020-05-15 VITALS
HEIGHT: 62 IN | WEIGHT: 134 LBS | SYSTOLIC BLOOD PRESSURE: 118 MMHG | BODY MASS INDEX: 24.66 KG/M2 | DIASTOLIC BLOOD PRESSURE: 62 MMHG

## 2020-05-15 DIAGNOSIS — N39.46 MIXED STRESS AND URGE URINARY INCONTINENCE: Primary | ICD-10-CM

## 2020-05-15 DIAGNOSIS — N95.2 VAGINAL ATROPHY: ICD-10-CM

## 2020-05-15 DIAGNOSIS — N81.6 RECTOCELE: ICD-10-CM

## 2020-05-15 DIAGNOSIS — Z46.89 PESSARY MAINTENANCE: ICD-10-CM

## 2020-05-15 DIAGNOSIS — N81.11 MIDLINE CYSTOCELE: ICD-10-CM

## 2020-05-15 LAB
BILIRUB UR QL STRIP: NEGATIVE
GLUCOSE UR QL STRIP: NEGATIVE
KETONES UR QL STRIP: NEGATIVE
LEUKOCYTE ESTERASE UR QL STRIP: NEGATIVE
PH, POC UA: 6.5
POC BLOOD, URINE: NEGATIVE
POC NITRATES, URINE: NEGATIVE
PROT UR QL STRIP: NEGATIVE
SP GR UR STRIP: 1.01 (ref 1–1.03)
UROBILINOGEN UR STRIP-ACNC: 0.1 (ref 0.1–1.1)

## 2020-05-15 PROCEDURE — 99999 PR PBB SHADOW E&M-EST. PATIENT-LVL V: CPT | Mod: PBBFAC,,, | Performed by: NURSE PRACTITIONER

## 2020-05-15 PROCEDURE — 99215 OFFICE O/P EST HI 40 MIN: CPT | Mod: PBBFAC | Performed by: NURSE PRACTITIONER

## 2020-05-15 PROCEDURE — 99213 PR OFFICE/OUTPT VISIT, EST, LEVL III, 20-29 MIN: ICD-10-PCS | Mod: S$PBB,,, | Performed by: NURSE PRACTITIONER

## 2020-05-15 PROCEDURE — 99213 OFFICE O/P EST LOW 20 MIN: CPT | Mod: S$PBB,,, | Performed by: NURSE PRACTITIONER

## 2020-05-15 PROCEDURE — 99999 PR PBB SHADOW E&M-EST. PATIENT-LVL V: ICD-10-PCS | Mod: PBBFAC,,, | Performed by: NURSE PRACTITIONER

## 2020-05-15 NOTE — PROGRESS NOTES
Urogyn follow up  05/15/2020  .  [facility] UROGYNECOLOGY-[id number]   [address] [id number]    June Hardik Judd  [id number]  1945      Anayeli Judd is a 75 y.o.  here for a urogyn follow up for pessary care    Last HPI from  03/09/2020  1)  Mixed urinary incontinence, urge > stress:    --initially improved with pessary.    --discharge has resolved.  2)  Vaginal atrophy (dryness):    --using estrogen cream  --using osphena  3)  Incomplete bladder emptying:  --can't tell if pessary helping; does have moderate 2nd void volumes by straining.  4)  Frequent UTIs (bladder infections):  --control diabetes  --work on improving bladder emptying  --has not been taking 250 mg maintenance dose  5)  Constipation intermittent with loose stool:  --better with 4-5 tsps/day  --not needing stool softener  --continue use miralax daily--see if taking 1/2 dose is sufficient  6)post menopausal bleeding  --ecc done last visit.              Changes from last visit:  1)  Mixed urinary incontinence, urge > stress:    --initially improved with pessary.       2)  Vaginal atrophy (dryness):    --using estrogen cream  --using osphena      3)  Incomplete bladder emptying:  --can't tell if pessary helping; does have moderate 2nd void volumes by straining.       4)  Frequent UTIs (bladder infections):  --control diabetes  --work on improving bladder emptying  --has not been taking 250 mg maintenance dose     5)  Constipation intermittent with loose stool:  --better with 4-5 tsps/day  --not needing stool softener  --continue use miralax daily--see if taking 1/2 dose is sufficient     6)post menopausal bleeding  --spotted yesterday           11/26/2018  Cysto with Togami  FINDINGS:  Dome, anterior, posterior, lateral walls and bladder base free of urothelial abnormalities. Right and left ureteral orifices in the normal postion and configuration, both effluxed clear urine.  Bladder neck and urethra were  normal    11/15/2018  Retroperitoneal ultrasound  Right kidney: Normal in size measuring 10 cm. Increased cortical echogenicity.  No loss of corticomedullary distinction. Resistive index measures 0.70.  No mass. No renal stone. Mild hydronephrosis.  Left kidney: Normal in size measuring 10.1 cm. Increased cortical echogenicity.  No loss of corticomedullary distinction. Resistive index measures 0.74.  No mass. No renal stone. Mildly prominent collecting system, possibly representing mild hydronephrosis.  Urinary bladder is distended but otherwise unremarkable.  Both ureteral jets are seen.  Impression  Mild bilateral hydronephrosis, right side greater than left, similar to recent CT.  Distended urinary bladder with preservation of bilateral ureteral jets.    Past Medical History:   Diagnosis Date    Abdominal pain     Allergic rhinitis     Arthritis     Blood transfusion     during delivery and     Bowel obstruction     Cervical radiculopathy     followed by dr cloud    Colon cancer     transverse colon; resected; Stage IIA (pT3 pN0 MX)    Diarrhea     Family history of breast cancer     Family history of colon cancer     Fatty liver     GERD (gastroesophageal reflux disease)     History of shingles     Hyperlipidemia     Hypothyroidism     Irritable bowel syndrome     Microscopic colitis     treated     Raynaud phenomenon     Raynaud's disease     Type 2 diabetes mellitus        Past Surgical History:   Procedure Laterality Date    APPENDECTOMY      BACK SURGERY      CARPAL TUNNEL RELEASE      bilateral      SECTION      CHOLECYSTECTOMY  1965    COLECTOMY  2011    Transverse colon resection by Dr. Aguirre    COLONOSCOPY N/A 2017    Procedure: COLONOSCOPY;  Surgeon: Manjit Alvarez MD;  Location: [address]);  Service: Endoscopy;  Laterality: N/A;    COLONOSCOPY N/A 2019    Procedure: COLONOSCOPY;  Surgeon: Ramiro Jefferson MD;  Location:  [facility] ENDO (4TH FLR);  Service: Endoscopy;  Laterality: N/A;  PM prep    ENDOSCOPIC ULTRASOUND OF UPPER GASTROINTESTINAL TRACT N/A 12/12/2018    Procedure: ULTRASOUND, UPPER GI TRACT, ENDOSCOPIC;  Surgeon: Jose Hess MD;  Location: [facility] ENDO;  Service: Endoscopy;  Laterality: N/A;    ENDOSCOPIC ULTRASOUND OF UPPER GASTROINTESTINAL TRACT N/A 1/23/2019    Procedure: ULTRASOUND, UPPER GI TRACT, ENDOSCOPIC;  Surgeon: Jose Hess MD;  Location: [facility] ENDO (2ND FLR);  Service: Endoscopy;  Laterality: N/A;    ESOPHAGOGASTRODUODENOSCOPY N/A 11/16/2018    Procedure: EGD (ESOPHAGOGASTRODUODENOSCOPY);  Surgeon: Angelo Reynolds MD;  Location: [facility] (2ND FLR);  Service: Endoscopy;  Laterality: N/A;    ESOPHAGOGASTRODUODENOSCOPY N/A 6/26/2019    Procedure: EGD (ESOPHAGOGASTRODUODENOSCOPY);  Surgeon: Ramiro Jefferson MD;  Location: [facility] (4TH FLR);  Service: Endoscopy;  Laterality: N/A;    EYE SURGERY      Cataract Removal    HYSTERECTOMY      posterolateral fusion with autograft bone and [place] mineralized bone matrix  2/1/13    at [facility] for lumbar spine stenosis    TOE SURGERY      TONSILLECTOMY      TRIGGER FINGER RELEASE         Current Outpatient Medications   Medication Sig    acetaZOLAMIDE (DIAMOX) 250 MG tablet Take 1 tablet (250 mg total) by mouth 3 (three) times daily.    atorvastatin (LIPITOR) 40 MG tablet TAKE 1 TABLET(40 MG) BY MOUTH EVERY DAY    azelastine (ASTELIN) 137 mcg (0.1 %) nasal spray 2 sprays (274 mcg total) by Nasal route 2 (two) times daily.    betamethasone dipropionate (DIPROLENE) 0.05 % ointment Apply every Am x 2 weeks then twice weekly    blood sugar diagnostic Strp Dispense madie contour strips; test blood sugar once daily    blood-glucose meter (CONTOUR METER) kit Please dispense Madie Contour meter and supplies Use as instructed Test Blood sugar once daily    cephALEXin (KEFLEX) 250 MG capsule TAKE 1 CAPSULE BY MOUTH  DAILY    cholestyramine-aspartame  (QUESTRAN/PREVALITE LIGHT) 4 gram Powd Take 4 g by mouth once daily.    diphenhydrAMINE (BENADRYL) 25 mg capsule Take 1 each (25 mg total) by mouth nightly as needed for Itching, Allergies or Insomnia.    docusate sodium (COLACE) 100 MG capsule Take 1 capsule (100 mg total) by mouth 2 (two) times daily.    DULoxetine (CYMBALTA) 30 MG capsule Take 1 capsule (30 mg total) by mouth 2 (two) times daily.    fluticasone (FLONASE) 50 mcg/actuation nasal spray 2 sprays by Each Nare route daily as needed.     FREESTYLE EFREN 10 DAY READER Misc TEST as directed    FREESTYLE EFREN 10 DAY SENSOR Kit Check blood glucose every day. Please dispense 6 mos supply.    gabapentin (NEURONTIN) 600 MG tablet Take 2 tablets (1,200 mg total) by mouth 2 (two) times daily.    hydrocortisone 2.5 % cream     Lactobacillus rhamnosus GG (CULTURELLE) 10 billion cell capsule Take 1 capsule by mouth once daily.    lancets (ONE TOUCH ULTRASOFT LANCETS) Misc Test blood sugar once daily    levothyroxine (SYNTHROID) 100 MCG tablet Take 1 tablet (100 mcg total) by mouth every morning.    lidocaine (LIDODERM) 5 %     linaGLIPtin (TRADJENTA) 5 mg Tab tablet Take 1 tablet (5 mg total) by mouth once daily.    magnesium 30 mg Tab Take 500 capsules by mouth. Patient's taking magnesium 500mg QD    metFORMIN (GLUCOPHAGE-XR) 750 MG XR 24hr tablet TAKE 1 TABLET(750 MG) BY MOUTH TWICE DAILY WITH MEALS    methocarbamol (ROBAXIN) 500 MG Tab Take 500 mg by mouth every evening.    mirabegron (MYRBETRIQ) 50 mg Tb24 Take 1 tablet (50 mg total) by mouth once daily.    mupirocin (BACTROBAN) 2 % ointment Apply topically 3 (three) times daily.    nitrofurantoin, macrocrystal-monohydrate, (MACROBID) 100 MG capsule Take 1 capsule (100 mg total) by mouth 2 (two) times daily.    ondansetron (ZOFRAN) 4 MG tablet Take 1 tablet (4 mg total) by mouth every 8 (eight) hours as needed for Nausea.    ondansetron (ZOFRAN-ODT) 8 MG TbDL     ospemifene (OSPHENA) 60  "mg Tab Take 60 mg by mouth once daily.    pantoprazole (PROTONIX) 40 MG tablet Take 1 tablet (40 mg total) by mouth once daily.    polyethylene glycol (GLYCOLAX) 17 gram/dose powder Take 17 g by mouth once daily.    PREMARIN vaginal cream PLACE 0.5 GRAM VAGINALLY EVERY EVENING    traMADol (ULTRAM) 50 mg tablet     triamcinolone acetonide 0.1% (KENALOG) 0.1 % paste apply to affected area with A Q-TIP three times a day    vitamin D 1000 units Tab Take 185 mg by mouth once daily. D3     No current facility-administered medications for this visit.        Well woman:  Pap test: post ANGEL.  History of abnormal paps: Yes--had ANGEL.  History of STIs:  No  Mammogram: Date of last: 05/2019 normal  Colonoscopy: Date of last: 2017.  Result:  Benign polyps.  Repeat due:  2019.    DEXA:  Date of last: 05/17/2019 normal     ROS:  As per HPI.      Exam  /62   Ht 5' 2" (1.575 m)   Wt 60.8 kg (134 lb)   LMP  (LMP Unknown)   BMI 24.51 kg/m²   General: alert and oriented, no acute distress  Scalp: incision in occipital area well healed-- 6 staples intact.   Respiratory: normal respiratory effort  Abd: soft, non-tender, non-distended    Pelvic  Ext. Genitalia:external genitalia no lesions. Atrophic. Normal bartholin's and skeens glands. Small scratch noted on R labia near introitus.  Vagina: + atrophy.  No lesions noted.  No discharge noted. #2 ring with support pessary in place  Cervix: absent ? Dimple where cervix was-- possible supracervical hyst  Uterus:  absent   Urethra: no masses or tenderness  Urethral meatus: no lesions, caruncle or prolapse.      Pessary removed without difficulty.  Washed with soap and water. Reinserted without complaints.      Impression  1. Mixed stress and urge urinary incontinence     2. Midline cystocele     3. Rectocele     4. Vaginal atrophy     5. Pessary maintenance       We reviewed the above issues and discussed options for short-term versus long-term management of her problems. "   Plan:     1)  Mixed urinary incontinence, urge > stress:    --control diabetes  --Empty bladder every 3 hours.  Empty well: wait a minute, lean forward on toilet.    --Avoid dietary irritants (see sheet).  Keep diary x 3-5 days to determine your irritants.  --consider PT in future   --URGE: consider medication (Mirabegron due to SBO).  Takes 2-4 weeks to see if will have effect.  For dry mouth: get sour, sugar free lozenge or gum.    --STRESS:  Pessary vs. Sling.   --#2 ring with support pessary     2)  Vaginal atrophy (dryness):  Use 1 gm vaginal estrogen cream nightly x 1 week.    --continue osphena  --may also reduce bladder infection rate     3)  post menopausal bleeding  --ECC results will take 2 weeks    4)  Incomplete bladder emptying:  --resolved with pessary     5)  Frequent UTIs (bladder infections):  --control diabetes  --work on improving bladder emptying  --continue Keflex 250 mg daily  --If you feel like you have a UTI, please call our office so that we can place an order for you to drop off a urine specimen at the closest [facility] lab (we will arrange).                --We will call in antibiotics for you to start right after you drop off specimen.                --In this way, we can determine:                           1)  Do you have a UTI?                            2) If you have a UTI, is it sensitive to the antibiotics we prescribed?   --follow UTI prevention tips (see attached)  --control bowel movements/fecal cross-contamination  --treat vaginal atrophy (dryness)  --empty bladder before and after intercourse  --consider need for further evaluation (pelvic imaging and cystoscopy) if issue persists; 618 CT A/P  normal     6)  Constipation intermittent with loose stool:  --follow GI recommendations due to small bowel obstruction    7)  RTC for pc      30 minutes were spent in face to face time with this patient  90 % of this time was spent in counseling and/or coordination of care    Blanka  KENNEDY Blair-BC  [facility]  Division of Female Pelvic Medicine and Reconstructive Surgery  Department of Obstetrics & Gynecology

## 2020-05-15 NOTE — PATIENT INSTRUCTIONS
1)  Mixed urinary incontinence, urge > stress:    --control diabetes  --Empty bladder every 3 hours.  Empty well: wait a minute, lean forward on toilet.    --Avoid dietary irritants (see sheet).  Keep diary x 3-5 days to determine your irritants.  --consider PT in future   --URGE: consider medication (Mirabegron due to SBO).  Takes 2-4 weeks to see if will have effect.  For dry mouth: get sour, sugar free lozenge or gum.    --STRESS:  Pessary vs. Sling.   --#2 ring with support pessary     2)  Vaginal atrophy (dryness):  Use 1 gm vaginal estrogen cream nightly x 1 week.    --continue osphena  --may also reduce bladder infection rate     3)  post menopausal bleeding  --ECC results will take 2 weeks    4)  Incomplete bladder emptying:  --resolved with pessary     5)  Frequent UTIs (bladder infections):  --control diabetes  --work on improving bladder emptying  --continue Keflex 250 mg daily  --If you feel like you have a UTI, please call our office so that we can place an order for you to drop off a urine specimen at the closest [facility] lab (we will arrange).                --We will call in antibiotics for you to start right after you drop off specimen.                --In this way, we can determine:                           1)  Do you have a UTI?                            2) If you have a UTI, is it sensitive to the antibiotics we prescribed?   --follow UTI prevention tips (see attached)  --control bowel movements/fecal cross-contamination  --treat vaginal atrophy (dryness)  --empty bladder before and after intercourse  --consider need for further evaluation (pelvic imaging and cystoscopy) if issue persists; 618 CT A/P  normal     6)  Constipation intermittent with loose stool:  --follow GI recommendations due to small bowel obstruction    7)  RTC for pc     [Irritable] : irritability [Inconsolable] : inconsolable [Constipation] : constipation [Abdominal Pain] : abdominal pain [Negative] : Genitourinary [Crying] : crying

## 2020-06-09 ENCOUNTER — TELEPHONE (OUTPATIENT)
Dept: HEMATOLOGY/ONCOLOGY | Facility: CLINIC | Age: 75
End: 2020-06-09

## 2020-06-09 DIAGNOSIS — Z85.038 HISTORY OF COLON CANCER: Primary | ICD-10-CM

## 2020-06-09 NOTE — TELEPHONE ENCOUNTER
"----- Message from Jassi Lentz sent at 6/9/2020 11:21 AM CDT -----  Contact: June  Scheduling Request    Patient Status: Establish  Scheduling Appt : June  Time/Date Preference: Anytime after 11 AM  MyChart Active User?: Yes  Relationship to Patient?: Self  Contact Preference?: [phone]  Treating Provider: Dr. Burleson  Do you feel you need to be seen today? No    Additional Notes:  "Thank you for all that you do for our patients'"      "

## 2020-06-11 NOTE — ASSESSMENT & PLAN NOTE
CONSULTING PHYSICIAN:  Na Jung MD  REFERRING PHYSICIAN:  ADAN VALENCIA DO  DATE OF CONSULTATION:  06/10/2020    REASON FOR CONSULTATION:  Dr. Harrell requested evaluation of aggression and  possible discharge.     HISTORY OF PRESENT ILLNESS:  The patient is an 16-year-old man with ADHD and  autism by history, who was brought to the emergency room yesterday by EMS and  police with restraints.  According to the emergency room note, the patient  was very upset.  He was sent to live with his father temporarily.  According  to the ED note, mother sent the patient to the father's house because she  said she needed a break and they have shared custody.  Father said the  patient has not been taking his medications.  Father said he found a knife  and a can of Mace on patient's person.  Father reported the patient has been  paranoid, worried people are looking at him or following him, and very angry  with watching a lot of the protest on TV.  Father showed ED staff video where  the patient was threatening to chop his father's head off.  The patient was  also yelling \"black lives matter\" and \"maba\" in the video.  The patient was  referred for transfer to inpatient psychiatry, but the bed is not yet  available.  This morning, due to agitation, he received p.r.n. Haldol and  Ativan, and he was given his home doses of Risperdal and Depakote.  This  interview was conducted via telehealth.  Face-to-face time spent on the  machine was 26 minutes.  The patient is located at [facility]  and writer is working remotely due to the pandemic.     The patient was drowsy, but oriented x3.  He was not very engaged in the  interview likely due to the medications he received earlier.  He admits he  only takes his medications once or twice a week and says he does not take it  when he is feeling okay.  He perseverated on wanting to stay with his mom  throughout the interview.  He alleged his father, grandmother and brother   Continue home meds  Endo following, appreciate assistance   were aggressive toward him and triggering him \"because I wanted to call my  mama.\"  When I asked him about threatening to chop off his father's head, he  said \"it is an expression.\"  The patient denied depressed mood or anhedonia.  He denied any particular anxiety or stressor.  He does not want to speak with  his father.  He admits he does not feel safe in father's neighborhood,  although he denied carrying Mace or knife.  The patient denied thoughts of  harming self or others.  He reported adequate sleep and appetite.  He denied  any hallucinations.     MEDICATIONS:  The patient was able to name he is supposed to be taking  Risperdal and Depakote, looks like those were last filled in April.  Risperdal 0.5 mg twice a day and Depakote  mg twice a day.     PAST PSYCHIATRIC HISTORY:  The patient was unable to name his psychiatrist or  state when he last followed up.  He has been hospitalized multiple times.  Diagnoses include ADHD, autism, and problems with anger.  The patient denied  history of suicide attempt.     PAST MEDICAL HISTORY:  No chronic problems.    SOCIAL HISTORY:  Apparently, the patient's parents have shared custody, chart  referenced past DCFS involvement.  The patient states he graduated from high  school and is not employed.  He is not in a relationship currently.  He  denied tobacco, alcohol, marijuana, or drug use.     FAMILY HISTORY:  Per chart, brother has autism and mother might have bipolar  disorder.     MENTAL STATUS EXAM:  The patient was somnolent, but oriented x3.  He appeared  his recorded age, was lying down in bed, in no acute distress, covered with  blankets.  He was calm when I saw him.  He kept his eyes closed much of the  interview and eye contact was limited.  His mood was \"tired.\"  Affect was  blunted.  Speech was soft, regular rate, and rhythm.  He denied any suicidal  or homicidal ideation.  He denied hallucinations.  He did not appear  internally preoccupied.  Thought  form was concrete.  Insight and judgment  were poor.     DIAGNOSES:  Autism spectrum disorder, attention deficit hyperactivity  disorder by history, rule out intermittent explosive disorder, and history of  oppositional defiant disorder.     ASSESSMENT:  This is an 16-year-old man, diagnosed with attention deficit  hyperactivity disorder and autism, who has a history of behavioral problems  and psychiatric admissions.  He was very upset, he was sent to his father's  house, and he became aggressive there.  The patient shows minimal insight  into his behaviors and blames his father and family for triggering him.  The  patient is not compliant with his psychotropic medications.  Father reports  the patient has been paranoid.  Substance abuse does not appear to be a  contributing factor.  The patient's brother has autism and mother may have  mental illness.     PLAN:  I discussed the case with the crisis worker, Roxane.  During the  course of my interview, the patient was accepted at a psychiatric facility  and I agree with transferring him as the safest way to further evaluate and  treat him.  They will reassess his usual medications.  The patient should be  considered for a long-acting injectable given his history of noncompliance.  Lab results were reviewed.  He is COVID negative.  Urine drug screen was  negative.  The Integrated Behavioral Health team may be reached through  [employer] for continued collaboration.           MD MINNIE Lake/Madelin     DD:  06/10/2020 14:44:29 / DT:  06/11/2020 00:39:33     Job #:  [id number]

## 2020-06-15 ENCOUNTER — OFFICE VISIT (OUTPATIENT)
Dept: UROGYNECOLOGY | Facility: CLINIC | Age: 75
End: 2020-06-15
Payer: MEDICARE

## 2020-06-15 VITALS
SYSTOLIC BLOOD PRESSURE: 110 MMHG | DIASTOLIC BLOOD PRESSURE: 60 MMHG | HEIGHT: 62 IN | BODY MASS INDEX: 24.3 KG/M2 | WEIGHT: 132.06 LBS

## 2020-06-15 DIAGNOSIS — N81.6 RECTOCELE: ICD-10-CM

## 2020-06-15 DIAGNOSIS — Z46.89 PESSARY MAINTENANCE: ICD-10-CM

## 2020-06-15 DIAGNOSIS — N95.2 VAGINAL ATROPHY: ICD-10-CM

## 2020-06-15 DIAGNOSIS — N81.11 MIDLINE CYSTOCELE: ICD-10-CM

## 2020-06-15 DIAGNOSIS — N39.46 MIXED STRESS AND URGE URINARY INCONTINENCE: Primary | ICD-10-CM

## 2020-06-15 DIAGNOSIS — Z12.31 ENCOUNTER FOR SCREENING MAMMOGRAM FOR BREAST CANCER: ICD-10-CM

## 2020-06-15 PROCEDURE — 99213 OFFICE O/P EST LOW 20 MIN: CPT | Mod: S$PBB,,, | Performed by: NURSE PRACTITIONER

## 2020-06-15 PROCEDURE — 99999 PR PBB SHADOW E&M-EST. PATIENT-LVL V: ICD-10-PCS | Mod: PBBFAC,,, | Performed by: NURSE PRACTITIONER

## 2020-06-15 PROCEDURE — 99213 PR OFFICE/OUTPT VISIT, EST, LEVL III, 20-29 MIN: ICD-10-PCS | Mod: S$PBB,,, | Performed by: NURSE PRACTITIONER

## 2020-06-15 PROCEDURE — 99215 OFFICE O/P EST HI 40 MIN: CPT | Mod: PBBFAC | Performed by: NURSE PRACTITIONER

## 2020-06-15 PROCEDURE — 99999 PR PBB SHADOW E&M-EST. PATIENT-LVL V: CPT | Mod: PBBFAC,,, | Performed by: NURSE PRACTITIONER

## 2020-06-15 NOTE — PROGRESS NOTES
Urogyn follow up  06/15/2020  .  [facility] UROGYNECOLOGY-[id number]   [address] [address]    June Hardik Judd  [id number]  1945      Anayeli Judd is a 75 y.o.  here for a urogyn follow up for pessary care    Last HPI from  03/09/2020  1)  Mixed urinary incontinence, urge > stress:    --initially improved with pessary.    --discharge has resolved.  2)  Vaginal atrophy (dryness):    --using estrogen cream  --using osphena  3)  Incomplete bladder emptying:  --can't tell if pessary helping; does have moderate 2nd void volumes by straining.  4)  Frequent UTIs (bladder infections):  --control diabetes  --work on improving bladder emptying  --has not been taking 250 mg maintenance dose  5)  Constipation intermittent with loose stool:  --better with 4-5 tsps/day  --not needing stool softener  --continue use miralax daily--see if taking 1/2 dose is sufficient  6)post menopausal bleeding  --ecc done last visit.      05/15/2020  1)  Mixed urinary incontinence, urge > stress:    --initially improved with pessary.       2)  Vaginal atrophy (dryness):    --using estrogen cream  --using osphena      3)  Incomplete bladder emptying:  --can't tell if pessary helping; does have moderate 2nd void volumes by straining.       4)  Frequent UTIs (bladder infections):  --control diabetes  --work on improving bladder emptying  --has not been taking 250 mg maintenance dose     5)  Constipation intermittent with loose stool:  --better with 4-5 tsps/day  --not needing stool softener  --continue use miralax daily--see if taking 1/2 dose is sufficient     6)post menopausal bleeding  --spotted yesterday     Changes since last visit  1)  Mixed urinary incontinence, urge > stress:    --initially improved with pessary.       2)  Vaginal atrophy (dryness):    --using estrogen cream      3)  Incomplete bladder emptying:  --improved with pessary       4)  Frequent UTIs (bladder infections):  --denies symptoms  at this time  --taking keflex 250 mg daily     5)  Constipation intermittent with loose stool:  --low fiber diet due to bowel obstruction     6)post menopausal bleeding  --resolved       2020  Pelvic ultrasound  FINDINGS:  The uterus is absent.  There is nonvisualization of the ovaries.  No sonographic abnormality is demonstrated.   Impression:   No sonographic abnormality in this hysterectomy patient.  There is nonvisualization of the ovaries.     2020  ECC  - Squamous mucosa with acute inflammation.   - No dysplasia/carcinoma identified.   - Detached fragments of acute and chronic inflammatory debris with   multinucleated giant cells.   - No fungal organisms identified by PASF stain    2018  Cysto with Togami  FINDINGS:  Dome, anterior, posterior, lateral walls and bladder base free of urothelial abnormalities. Right and left ureteral orifices in the normal postion and configuration, both effluxed clear urine.  Bladder neck and urethra were normal    11/15/2018  Retroperitoneal ultrasound  Right kidney: Normal in size measuring 10 cm. Increased cortical echogenicity.  No loss of corticomedullary distinction. Resistive index measures 0.70.  No mass. No renal stone. Mild hydronephrosis.  Left kidney: Normal in size measuring 10.1 cm. Increased cortical echogenicity.  No loss of corticomedullary distinction. Resistive index measures 0.74.  No mass. No renal stone. Mildly prominent collecting system, possibly representing mild hydronephrosis.  Urinary bladder is distended but otherwise unremarkable.  Both ureteral jets are seen.  Impression  Mild bilateral hydronephrosis, right side greater than left, similar to recent CT.  Distended urinary bladder with preservation of bilateral ureteral jets.    Past Medical History:   Diagnosis Date    Abdominal pain     Allergic rhinitis     Arthritis     Blood transfusion     during delivery and     Bowel obstruction     Cervical radiculopathy      followed by dr reynolds    Colon cancer     transverse colon; resected; Stage IIA (pT3 pN0 MX)    Diarrhea     Family history of breast cancer     Family history of colon cancer     Fatty liver     GERD (gastroesophageal reflux disease)     History of shingles     Hyperlipidemia     Hypothyroidism     Irritable bowel syndrome     Microscopic colitis     treated     Raynaud phenomenon     Raynaud's disease     Type 2 diabetes mellitus        Past Surgical History:   Procedure Laterality Date    APPENDECTOMY      BACK SURGERY      CARPAL TUNNEL RELEASE      bilateral      SECTION      CHOLECYSTECTOMY  1965    COLECTOMY  2011    Transverse colon resection by Dr. Aguirre    COLONOSCOPY N/A 2017    Procedure: COLONOSCOPY;  Surgeon: Manjit Alvarez MD;  Location: [facility] (4TH FLR);  Service: Endoscopy;  Laterality: N/A;    COLONOSCOPY N/A 2019    Procedure: COLONOSCOPY;  Surgeon: Ramiro Jefferson MD;  Location: [facility] (4TH FLR);  Service: Endoscopy;  Laterality: N/A;  PM prep    ENDOSCOPIC ULTRASOUND OF UPPER GASTROINTESTINAL TRACT N/A 2018    Procedure: ULTRASOUND, UPPER GI TRACT, ENDOSCOPIC;  Surgeon: Jose Hess MD;  Location: [facility];  Service: Endoscopy;  Laterality: N/A;    ENDOSCOPIC ULTRASOUND OF UPPER GASTROINTESTINAL TRACT N/A 2019    Procedure: ULTRASOUND, UPPER GI TRACT, ENDOSCOPIC;  Surgeon: Jose Hess MD;  Location: [facility] (2ND FLR);  Service: Endoscopy;  Laterality: N/A;    ESOPHAGOGASTRODUODENOSCOPY N/A 2018    Procedure: EGD (ESOPHAGOGASTRODUODENOSCOPY);  Surgeon: Angelo Reynolds MD;  Location: [facility] (2ND FLR);  Service: Endoscopy;  Laterality: N/A;    ESOPHAGOGASTRODUODENOSCOPY N/A 2019    Procedure: EGD (ESOPHAGOGASTRODUODENOSCOPY);  Surgeon: Ramiro Jefferson MD;  Location: [facility] (4TH FLR);  Service: Endoscopy;  Laterality: N/A;    EYE SURGERY      Cataract Removal    HYSTERECTOMY       posterolateral fusion with autograft bone and Eun mineralized bone matrix  2/1/13    at [facility] for lumbar spine stenosis    TOE SURGERY      TONSILLECTOMY      TRIGGER FINGER RELEASE         Current Outpatient Medications   Medication Sig    acetaZOLAMIDE (DIAMOX) 250 MG tablet Take 1 tablet (250 mg total) by mouth 3 (three) times daily.    atorvastatin (LIPITOR) 40 MG tablet TAKE 1 TABLET(40 MG) BY MOUTH EVERY DAY    azelastine (ASTELIN) 137 mcg (0.1 %) nasal spray 2 sprays (274 mcg total) by Nasal route 2 (two) times daily.    betamethasone dipropionate (DIPROLENE) 0.05 % ointment Apply every Am x 2 weeks then twice weekly    blood sugar diagnostic Strp Dispense [employer] contour strips; test blood sugar once daily    blood-glucose meter (CONTOUR METER) kit Please dispense [employer] Contour meter and supplies Use as instructed Test Blood sugar once daily    cephALEXin (KEFLEX) 250 MG capsule TAKE 1 CAPSULE BY MOUTH  DAILY    cholestyramine-aspartame (QUESTRAN/PREVALITE LIGHT) 4 gram Powd Take 4 g by mouth once daily.    diphenhydrAMINE (BENADRYL) 25 mg capsule Take 1 each (25 mg total) by mouth nightly as needed for Itching, Allergies or Insomnia.    docusate sodium (COLACE) 100 MG capsule Take 1 capsule (100 mg total) by mouth 2 (two) times daily.    DULoxetine (CYMBALTA) 30 MG capsule Take 1 capsule (30 mg total) by mouth 2 (two) times daily.    fluticasone (FLONASE) 50 mcg/actuation nasal spray 2 sprays by Each Nare route daily as needed.     FREESTYLE EFREN 10 DAY READER Misc TEST as directed    FREESTYLE EFREN 10 DAY SENSOR Kit Check blood glucose every day. Please dispense 6 mos supply.    gabapentin (NEURONTIN) 600 MG tablet Take 2 tablets (1,200 mg total) by mouth 2 (two) times daily.    hydrocortisone 2.5 % cream     Lactobacillus rhamnosus GG (CULTURELLE) 10 billion cell capsule Take 1 capsule by mouth once daily.    lancets (ONE TOUCH ULTRASOFT LANCETS) Misc Test blood sugar once daily     levothyroxine (SYNTHROID) 100 MCG tablet Take 1 tablet (100 mcg total) by mouth every morning.    lidocaine (LIDODERM) 5 %     magnesium 30 mg Tab Take 500 capsules by mouth. Patient's taking magnesium 500mg QD    methocarbamol (ROBAXIN) 500 MG Tab Take 500 mg by mouth every evening.    mirabegron (MYRBETRIQ) 50 mg Tb24 Take 1 tablet (50 mg total) by mouth once daily.    mupirocin (BACTROBAN) 2 % ointment Apply topically 3 (three) times daily.    nitrofurantoin, macrocrystal-monohydrate, (MACROBID) 100 MG capsule Take 1 capsule (100 mg total) by mouth 2 (two) times daily.    ondansetron (ZOFRAN) 4 MG tablet Take 1 tablet (4 mg total) by mouth every 8 (eight) hours as needed for Nausea.    ondansetron (ZOFRAN-ODT) 8 MG TbDL     ospemifene (OSPHENA) 60 mg Tab Take 60 mg by mouth once daily.    pantoprazole (PROTONIX) 40 MG tablet Take 1 tablet (40 mg total) by mouth once daily.    polyethylene glycol (GLYCOLAX) 17 gram/dose powder Take 17 g by mouth once daily.    PREMARIN vaginal cream PLACE 0.5 GRAM VAGINALLY EVERY EVENING    traMADol (ULTRAM) 50 mg tablet     triamcinolone acetonide 0.1% (KENALOG) 0.1 % paste apply to affected area with A Q-TIP three times a day    vitamin D 1000 units Tab Take 185 mg by mouth once daily. D3    linaGLIPtin (TRADJENTA) 5 mg Tab tablet Take 1 tablet (5 mg total) by mouth once daily. (Patient not taking: Reported on 6/15/2020)    metFORMIN (GLUCOPHAGE-XR) 750 MG XR 24hr tablet TAKE 1 TABLET(750 MG) BY MOUTH TWICE DAILY WITH MEALS (Patient not taking: Reported on 6/15/2020)     No current facility-administered medications for this visit.        Well woman:  Pap test: post ANGEL.  History of abnormal paps: Yes--had ANGEL.  History of STIs:  No  Mammogram: Date of last: 05/2019 normal--patient reports-- ordered  Colonoscopy: Date of last: 2019.  Result: normal.  Repeat due: 3-5 years    DEXA:  Date of last: 05/17/2019 normal     ROS:  As per HPI.      Exam  /60 (BP  "Location: Right arm, Patient Position: Sitting, BP Method: Medium (Manual))   Ht 5' 2" (1.575 m)   Wt 59.9 kg (132 lb 0.9 oz)   LMP  (LMP Unknown)   BMI 24.15 kg/m²   General: alert and oriented, no acute distress.   Respiratory: normal respiratory effort  Abd: soft, non-tender, non-distended    Pelvic  Ext. Genitalia:external genitalia no lesions. Atrophic. Normal bartholin's and skeens glands. Small scratch noted on R labia near introitus.  Vagina: + atrophy.  No lesions noted.  No discharge noted. #2 ring with support pessary in place  Cervix: absent ? Dimple where cervix was-- possible supracervical hyst  Uterus:  absent   Urethra: no masses or tenderness  Urethral meatus: no lesions, caruncle or prolapse.      Pessary removed without difficulty.  Washed with soap and water. Reinserted without complaints.      Impression  1. Mixed stress and urge urinary incontinence     2. Midline cystocele     3. Rectocele     4. Encounter for screening mammogram for breast cancer  Mammo Digital Screening Bilat With CAD   5. Vaginal atrophy     6. Pessary maintenance       We reviewed the above issues and discussed options for short-term versus long-term management of her problems.   Plan:     1)  Mixed urinary incontinence, urge > stress:    --control diabetes  --Empty bladder every 3 hours.  Empty well: wait a minute, lean forward on toilet.    --Avoid dietary irritants (see sheet).  Keep diary x 3-5 days to determine your irritants.  --consider PT in future   --URGE: consider medication (Mirabegron due to SBO).  Takes 2-4 weeks to see if will have effect.  For dry mouth: get sour, sugar free lozenge or gum.    --STRESS:  Pessary vs. Sling.   --#2 ring with support pessary     2)  Vaginal atrophy (dryness):  Use 1 gm vaginal estrogen cream nightly x 1 week.    --continue osphena  --may also reduce bladder infection rate     3)  post menopausal bleeding  --ECC results will take 2 weeks    4)  Incomplete bladder " emptying:  --resolved with pessary     5)  Frequent UTIs (bladder infections):  --control diabetes  --work on improving bladder emptying  --continue Keflex 250 mg daily  --If you feel like you have a UTI, please call our office so that we can place an order for you to drop off a urine specimen at the closest [facility] lab (we will arrange).                --We will call in antibiotics for you to start right after you drop off specimen.                --In this way, we can determine:                           1)  Do you have a UTI?                            2) If you have a UTI, is it sensitive to the antibiotics we prescribed?   --follow UTI prevention tips (see attached)  --control bowel movements/fecal cross-contamination  --treat vaginal atrophy (dryness)  --empty bladder before and after intercourse  --consider need for further evaluation (pelvic imaging and cystoscopy) if issue persists; 618 CT A/P  normal     6)  Constipation intermittent with loose stool:  --follow GI recommendations due to small bowel obstruction    7)  RTC for pc      30 minutes were spent in face to face time with this patient  90 % of this time was spent in counseling and/or coordination of care    KENNEDY Petit-BC [facility]  Division of Female Pelvic Medicine and Reconstructive Surgery  Department of Obstetrics & Gynecology

## 2020-06-15 NOTE — PATIENT INSTRUCTIONS
1)  Mixed urinary incontinence, urge > stress:    --control diabetes  --Empty bladder every 3 hours.  Empty well: wait a minute, lean forward on toilet.    --Avoid dietary irritants (see sheet).  Keep diary x 3-5 days to determine your irritants.  --consider PT in future   --URGE: consider medication (Mirabegron due to SBO).  Takes 2-4 weeks to see if will have effect.  For dry mouth: get sour, sugar free lozenge or gum.    --STRESS:  Pessary vs. Sling.   --#2 ring with support pessary     2)  Vaginal atrophy (dryness):  Use 1 gm vaginal estrogen cream nightly x 1 week.    --continue osphena  --may also reduce bladder infection rate     3)  post menopausal bleeding  --ECC results will take 2 weeks    4)  Incomplete bladder emptying:  --resolved with pessary     5)  Frequent UTIs (bladder infections):  --control diabetes  --work on improving bladder emptying  --continue Keflex 250 mg daily  --If you feel like you have a UTI, please call our office so that we can place an order for you to drop off a urine specimen at the closest [facility] lab (we will arrange).                --We will call in antibiotics for you to start right after you drop off specimen.                --In this way, we can determine:                           1)  Do you have a UTI?                            2) If you have a UTI, is it sensitive to the antibiotics we prescribed?   --follow UTI prevention tips (see attached)  --control bowel movements/fecal cross-contamination  --treat vaginal atrophy (dryness)  --empty bladder before and after intercourse  --consider need for further evaluation (pelvic imaging and cystoscopy) if issue persists; 618 CT A/P  normal     6)  Constipation intermittent with loose stool:  --follow GI recommendations due to small bowel obstruction    7)  RTC for pc

## 2020-07-07 ENCOUNTER — HOSPITAL ENCOUNTER (OUTPATIENT)
Dept: RADIOLOGY | Facility: OTHER | Age: 75
Discharge: HOME | End: 2020-07-07
Attending: NURSE PRACTITIONER
Payer: MEDICARE

## 2020-07-07 DIAGNOSIS — Z12.31 ENCOUNTER FOR SCREENING MAMMOGRAM FOR BREAST CANCER: ICD-10-CM

## 2020-07-07 PROCEDURE — 77063 BREAST TOMOSYNTHESIS BI: CPT | Mod: 26,,, | Performed by: RADIOLOGY

## 2020-07-07 PROCEDURE — 77067 SCR MAMMO BI INCL CAD: CPT | Mod: TC

## 2020-07-07 PROCEDURE — 77063 MAMMO DIGITAL SCREENING BILAT WITH TOMOSYNTHESIS_CAD: ICD-10-PCS | Mod: 26,,, | Performed by: RADIOLOGY

## 2020-07-07 PROCEDURE — 77067 MAMMO DIGITAL SCREENING BILAT WITH TOMOSYNTHESIS_CAD: ICD-10-PCS | Mod: 26,,, | Performed by: RADIOLOGY

## 2020-07-07 PROCEDURE — 77067 SCR MAMMO BI INCL CAD: CPT | Mod: 26,,, | Performed by: RADIOLOGY

## 2020-07-09 ENCOUNTER — PATIENT MESSAGE (OUTPATIENT)
Dept: UROGYNECOLOGY | Facility: CLINIC | Age: 75
End: 2020-07-09

## 2020-07-24 ENCOUNTER — LAB VISIT (OUTPATIENT)
Dept: LAB | Facility: HOSPITAL | Age: 75
End: 2020-07-24
Payer: MEDICARE

## 2020-07-24 ENCOUNTER — OFFICE VISIT (OUTPATIENT)
Dept: HEMATOLOGY/ONCOLOGY | Facility: CLINIC | Age: 75
End: 2020-07-24
Payer: MEDICARE

## 2020-07-24 VITALS
TEMPERATURE: 98 F | BODY MASS INDEX: 22.81 KG/M2 | RESPIRATION RATE: 16 BRPM | OXYGEN SATURATION: 98 % | WEIGHT: 133.63 LBS | HEIGHT: 64 IN | HEART RATE: 78 BPM | DIASTOLIC BLOOD PRESSURE: 50 MMHG | SYSTOLIC BLOOD PRESSURE: 99 MMHG

## 2020-07-24 DIAGNOSIS — Z85.038 HISTORY OF COLON CANCER: Primary | ICD-10-CM

## 2020-07-24 DIAGNOSIS — E11.9 TYPE 2 DIABETES MELLITUS WITHOUT COMPLICATION, WITHOUT LONG-TERM CURRENT USE OF INSULIN: Chronic | ICD-10-CM

## 2020-07-24 DIAGNOSIS — D69.6 THROMBOCYTOPENIA: ICD-10-CM

## 2020-07-24 DIAGNOSIS — Z85.038 HISTORY OF COLON CANCER: ICD-10-CM

## 2020-07-24 LAB
ALBUMIN SERPL BCP-MCNC: 3.9 G/DL (ref 3.5–5.2)
ALP SERPL-CCNC: 56 U/L (ref 55–135)
ALT SERPL W/O P-5'-P-CCNC: 38 U/L (ref 10–44)
ANION GAP SERPL CALC-SCNC: 9 MMOL/L (ref 8–16)
AST SERPL-CCNC: 29 U/L (ref 10–40)
BILIRUB SERPL-MCNC: 1.1 MG/DL (ref 0.1–1)
BUN SERPL-MCNC: 18 MG/DL (ref 8–23)
CALCIUM SERPL-MCNC: 9.6 MG/DL (ref 8.7–10.5)
CEA SERPL-MCNC: 4.5 NG/ML (ref 0–5)
CHLORIDE SERPL-SCNC: 99 MMOL/L (ref 95–110)
CO2 SERPL-SCNC: 27 MMOL/L (ref 23–29)
CREAT SERPL-MCNC: 1 MG/DL (ref 0.5–1.4)
ERYTHROCYTE [DISTWIDTH] IN BLOOD BY AUTOMATED COUNT: 12.3 % (ref 11.5–14.5)
EST. GFR  (AFRICAN AMERICAN): >60 ML/MIN/1.73 M^2
EST. GFR  (NON AFRICAN AMERICAN): 54.5 ML/MIN/1.73 M^2
GLUCOSE SERPL-MCNC: 299 MG/DL (ref 70–110)
HCT VFR BLD AUTO: 39.2 % (ref 37–48.5)
HGB BLD-MCNC: 12.9 G/DL (ref 12–16)
IMM GRANULOCYTES # BLD AUTO: 0.02 K/UL (ref 0–0.04)
MCH RBC QN AUTO: 33.6 PG (ref 27–31)
MCHC RBC AUTO-ENTMCNC: 32.9 G/DL (ref 32–36)
MCV RBC AUTO: 102 FL (ref 82–98)
NEUTROPHILS # BLD AUTO: 3.9 K/UL (ref 1.8–7.7)
PLATELET # BLD AUTO: 96 K/UL (ref 150–350)
PMV BLD AUTO: 10.4 FL (ref 9.2–12.9)
POTASSIUM SERPL-SCNC: 4.4 MMOL/L (ref 3.5–5.1)
PROT SERPL-MCNC: 7.4 G/DL (ref 6–8.4)
RBC # BLD AUTO: 3.84 M/UL (ref 4–5.4)
SODIUM SERPL-SCNC: 135 MMOL/L (ref 136–145)
WBC # BLD AUTO: 6.26 K/UL (ref 3.9–12.7)

## 2020-07-24 PROCEDURE — 80053 COMPREHEN METABOLIC PANEL: CPT

## 2020-07-24 PROCEDURE — 99999 PR PBB SHADOW E&M-EST. PATIENT-LVL V: ICD-10-PCS | Mod: PBBFAC,,, | Performed by: INTERNAL MEDICINE

## 2020-07-24 PROCEDURE — 99215 OFFICE O/P EST HI 40 MIN: CPT | Mod: PBBFAC | Performed by: INTERNAL MEDICINE

## 2020-07-24 PROCEDURE — 82378 CARCINOEMBRYONIC ANTIGEN: CPT

## 2020-07-24 PROCEDURE — 99214 PR OFFICE/OUTPT VISIT, EST, LEVL IV, 30-39 MIN: ICD-10-PCS | Mod: S$PBB,,, | Performed by: INTERNAL MEDICINE

## 2020-07-24 PROCEDURE — 99214 OFFICE O/P EST MOD 30 MIN: CPT | Mod: S$PBB,,, | Performed by: INTERNAL MEDICINE

## 2020-07-24 PROCEDURE — 99999 PR PBB SHADOW E&M-EST. PATIENT-LVL V: CPT | Mod: PBBFAC,,, | Performed by: INTERNAL MEDICINE

## 2020-07-24 PROCEDURE — 85027 COMPLETE CBC AUTOMATED: CPT

## 2020-07-24 PROCEDURE — 36415 COLL VENOUS BLD VENIPUNCTURE: CPT

## 2020-07-24 NOTE — PROGRESS NOTES
"Subjective:       Patient ID: Anayeli Hardik Judd is a 75 y.o. female.    Chief Complaint: History of colon cancer  ONCOLOGIC HISTORY:    HPIMs. Judd is a 89-year-old female with a diagnosis of stage IIA transverse colon adenocarcinoma and underwent transverse colectomy on 6/27/11. She completed seven cycles of FOLFOX and then had to be taken off chemotherapy secondary to prolonged hospitalization and diarrhea.    She had a colonoscopy in 5/2013 was normal and a repeat was recommended in 5 years.                   HPI She comes in to review labs. Colonoscopy in June 2019 was normal. CT scans in 3/2020 revealed "Postoperative change of the small bowel with dilation and air-fluid levels suggesting a small bowel obstruction.  Recommend surgical follow-up. Hepatic steatosis. Small hiatal hernia. Mild splenomegaly. Minimal hydronephrosis of the right kidney" she was treated with conservative management  She feels OK now.    Review of Systems   Constitutional: Negative for appetite change, fatigue and unexpected weight change.   HENT: Negative for mouth sores.    Eyes: Negative for visual disturbance.   Respiratory: Negative for cough and shortness of breath.    Cardiovascular: Negative for chest pain.   Gastrointestinal: Negative for abdominal pain and diarrhea.   Genitourinary: Negative for frequency.   Musculoskeletal: Negative for back pain.   Integumentary:  Negative for rash.   Neurological: Negative for headaches.   Hematological: Negative for adenopathy.   Psychiatric/Behavioral: The patient is not nervous/anxious.    All other systems reviewed and are negative.          Objective:      Physical Exam  Vitals signs reviewed.   Constitutional:       Appearance: She is well-developed.   HENT:      Mouth/Throat:      Pharynx: No oropharyngeal exudate.   Cardiovascular:      Rate and Rhythm: Normal rate.      Heart sounds: Normal heart sounds.   Pulmonary:      Effort: Pulmonary effort is normal.      Breath sounds: " Normal breath sounds. No wheezing.   Abdominal:      General: Bowel sounds are normal.      Palpations: Abdomen is soft.      Tenderness: There is no abdominal tenderness.   Musculoskeletal:         General: No tenderness.   Lymphadenopathy:      Cervical: No cervical adenopathy.   Skin:     General: Skin is warm and dry.      Findings: No rash.   Neurological:      Mental Status: She is alert and oriented to person, place, and time.      Coordination: Coordination normal.   Psychiatric:         Thought Content: Thought content normal.         Judgment: Judgment normal.           LABS:  WBC   Date Value Ref Range Status   07/24/2020 6.26 3.90 - 12.70 K/uL Final     Hemoglobin   Date Value Ref Range Status   07/24/2020 12.9 12.0 - 16.0 g/dL Final     POC Hematocrit   Date Value Ref Range Status   03/11/2020 21 (L) 36 - 54 %PCV Final     Hematocrit   Date Value Ref Range Status   07/24/2020 39.2 37.0 - 48.5 % Final     Platelets   Date Value Ref Range Status   07/24/2020 96 (L) 150 - 350 K/uL Final     Gran # (ANC)   Date Value Ref Range Status   07/24/2020 3.9 1.8 - 7.7 K/uL Final     Comment:     The ANC is based on a white cell differential from an   automated cell counter. It has not been microscopically   reviewed for the presence of abnormal cells. Clinical   correlation is required.         Chemistry        Component Value Date/Time     (L) 07/24/2020 1222    K 4.4 07/24/2020 1222    CL 99 07/24/2020 1222    CO2 27 07/24/2020 1222    BUN 18 07/24/2020 1222    CREATININE 1.0 07/24/2020 1222     (H) 07/24/2020 1222        Component Value Date/Time    CALCIUM 9.6 07/24/2020 1222    ALKPHOS 56 07/24/2020 1222    AST 29 07/24/2020 1222    ALT 38 07/24/2020 1222    BILITOT 1.1 (H) 07/24/2020 1222    ESTGFRAFRICA >60.0 07/24/2020 1222    EGFRNONAA 54.5 (A) 07/24/2020 1222        CEA: 4.4      Assessment:       1. History of colon cancer    2. Type 2 diabetes mellitus without complication, without  long-term current use of insulin    3. Thrombocytopenia        Plan:         1, She is doing well clinically and is 10 years since diagnosis., Does not require routine follow-up   2, Stable follows with PCP.  3. Chronic unclear etiology. Will schedule a bone marrow aspiration and biopsy    Above care plan was discussed with patient and all questions were addressed to her satisfaction

## 2020-07-31 DIAGNOSIS — D69.3 THROMBOCYTOPENIA, IDIOPATHIC: ICD-10-CM

## 2020-07-31 DIAGNOSIS — D69.6 THROMBOCYTOPENIA: Primary | ICD-10-CM

## 2020-07-31 DIAGNOSIS — Z85.038 PERSONAL HISTORY OF COLON CANCER: ICD-10-CM

## 2020-08-03 ENCOUNTER — PROCEDURE VISIT (OUTPATIENT)
Dept: HEMATOLOGY/ONCOLOGY | Facility: CLINIC | Age: 75
End: 2020-08-03
Payer: MEDICARE

## 2020-08-03 VITALS
RESPIRATION RATE: 16 BRPM | OXYGEN SATURATION: 95 % | HEART RATE: 100 BPM | DIASTOLIC BLOOD PRESSURE: 52 MMHG | TEMPERATURE: 98 F | SYSTOLIC BLOOD PRESSURE: 101 MMHG

## 2020-08-03 DIAGNOSIS — D69.6 THROMBOCYTOPENIA: ICD-10-CM

## 2020-08-03 PROCEDURE — 88305 TISSUE EXAM BY PATHOLOGIST: ICD-10-PCS | Mod: 26,,, | Performed by: PATHOLOGY

## 2020-08-03 PROCEDURE — 30000890 MAYO MISCELLANEOUS TEST (REFLEX)

## 2020-08-03 PROCEDURE — 88271 CYTOGENETICS DNA PROBE: CPT

## 2020-08-03 PROCEDURE — 88341 PR IHC OR ICC EACH ADD'L SINGLE ANTIBODY  STAINPR: ICD-10-PCS | Mod: 26,59,, | Performed by: PATHOLOGY

## 2020-08-03 PROCEDURE — 88341 IMHCHEM/IMCYTCHM EA ADD ANTB: CPT | Performed by: PATHOLOGY

## 2020-08-03 PROCEDURE — 88184 FLOWCYTOMETRY/ TC 1 MARKER: CPT | Performed by: PATHOLOGY

## 2020-08-03 PROCEDURE — 88189 FLOWCYTOMETRY/READ 16 & >: CPT | Mod: ,,, | Performed by: PATHOLOGY

## 2020-08-03 PROCEDURE — 88311 DECALCIFY TISSUE: CPT | Performed by: PATHOLOGY

## 2020-08-03 PROCEDURE — 85097 BONE MARROW INTERPRETATION: CPT | Mod: ,,, | Performed by: PATHOLOGY

## 2020-08-03 PROCEDURE — 38222 DX BONE MARROW BX & ASPIR: CPT | Mod: PBBFAC | Performed by: NURSE PRACTITIONER

## 2020-08-03 PROCEDURE — 88342 IMHCHEM/IMCYTCHM 1ST ANTB: CPT | Mod: 59 | Performed by: PATHOLOGY

## 2020-08-03 PROCEDURE — 88342 CHG IMMUNOCYTOCHEMISTRY: ICD-10-PCS | Mod: 26,59,, | Performed by: PATHOLOGY

## 2020-08-03 PROCEDURE — 38222 PR BONE MARROW BIOPSY(IES) W/ASPIRATION(S); DIAGNOSTIC: ICD-10-PCS | Mod: S$PBB,LT,, | Performed by: NURSE PRACTITIONER

## 2020-08-03 PROCEDURE — 85097 PR  BONE MARROW,SMEAR INTERPRETATION: ICD-10-PCS | Mod: ,,, | Performed by: PATHOLOGY

## 2020-08-03 PROCEDURE — 88313 PR  SPECIAL STAINS,GROUP II: ICD-10-PCS | Mod: 26,,, | Performed by: PATHOLOGY

## 2020-08-03 PROCEDURE — 88305 TISSUE EXAM BY PATHOLOGIST: CPT | Performed by: PATHOLOGY

## 2020-08-03 PROCEDURE — 88311 DECALCIFY TISSUE: CPT | Mod: 26,,, | Performed by: PATHOLOGY

## 2020-08-03 PROCEDURE — 38222 DX BONE MARROW BX & ASPIR: CPT | Mod: S$PBB,LT,, | Performed by: NURSE PRACTITIONER

## 2020-08-03 PROCEDURE — 88313 SPECIAL STAINS GROUP 2: CPT | Mod: 26,,, | Performed by: PATHOLOGY

## 2020-08-03 PROCEDURE — 88185 FLOWCYTOMETRY/TC ADD-ON: CPT | Performed by: PATHOLOGY

## 2020-08-03 PROCEDURE — 88341 IMHCHEM/IMCYTCHM EA ADD ANTB: CPT | Mod: 26,59,, | Performed by: PATHOLOGY

## 2020-08-03 PROCEDURE — 88305 TISSUE EXAM BY PATHOLOGIST: CPT | Mod: 26,,, | Performed by: PATHOLOGY

## 2020-08-03 PROCEDURE — 88342 IMHCHEM/IMCYTCHM 1ST ANTB: CPT | Mod: 26,59,, | Performed by: PATHOLOGY

## 2020-08-03 PROCEDURE — 88189 PR  FLOWCYTOMETRY/READ, 16 & > MARKERS: ICD-10-PCS | Mod: ,,, | Performed by: PATHOLOGY

## 2020-08-03 PROCEDURE — 88313 SPECIAL STAINS GROUP 2: CPT | Performed by: PATHOLOGY

## 2020-08-03 PROCEDURE — 88311 PR  DECALCIFY TISSUE: ICD-10-PCS | Mod: 26,,, | Performed by: PATHOLOGY

## 2020-08-03 PROCEDURE — 88237 TISSUE CULTURE BONE MARROW: CPT

## 2020-08-03 NOTE — PROCEDURES
Procedures   PROCEDURE NOTE:  Date of Procedure: 08/03/2020  Bone Marrow Biopsy and Aspiration  Indication: thrombocytopenia  Consent: Informed consent was obtained from patient.  Timeout: Done and documented.  Position: Prone  Site: Left posterior illiac crest.  Prep: Betadine.  Needle used: 11 gauge Jamshidi needle.  Anesthetic: 2% lidocaine 5 cc.  Biopsy: The biopsy needle was introduced into the marrow cavity and an aspirate was obtained without complications and sent for flow cytometry and cytogenetics and MDS FISH. Core biopsy obtained without difficulty and sent for routine histologic examination.  Complications: None.  Disposition: The patient was Patient was left in the room with RN and instructed to lie on back for 20 minutes. Instructed to keep dressing dry and intact for 24 hours.  Blood loss: Minimal.     Dori Wright, MAGNOLIAP  Hematology/Oncology/Bone Marrow Transplant

## 2020-08-03 NOTE — PROCEDURES
Patient presented for planned BMBx without sedation. Thought she was here for a spleen biopsy, but agreed to procedure. Tolerated procedure well.    Dori Wright, FNP  Hematology/Oncology/Bone Marrow Transplant

## 2020-08-04 ENCOUNTER — LAB VISIT (OUTPATIENT)
Dept: LAB | Facility: HOSPITAL | Age: 75
End: 2020-08-04
Attending: INTERNAL MEDICINE
Payer: MEDICARE

## 2020-08-04 DIAGNOSIS — E11.9 TYPE 2 DIABETES MELLITUS WITHOUT COMPLICATION, WITHOUT LONG-TERM CURRENT USE OF INSULIN: ICD-10-CM

## 2020-08-04 DIAGNOSIS — E53.8 VITAMIN B12 DEFICIENCY: ICD-10-CM

## 2020-08-04 DIAGNOSIS — E78.5 HYPERLIPIDEMIA, UNSPECIFIED HYPERLIPIDEMIA TYPE: ICD-10-CM

## 2020-08-04 DIAGNOSIS — E53.8 B12 DEFICIENCY: ICD-10-CM

## 2020-08-04 DIAGNOSIS — E03.4 HYPOTHYROIDISM DUE TO ACQUIRED ATROPHY OF THYROID: ICD-10-CM

## 2020-08-04 LAB
ALBUMIN SERPL BCP-MCNC: 4 G/DL (ref 3.5–5.2)
ALP SERPL-CCNC: 55 U/L (ref 55–135)
ALT SERPL W/O P-5'-P-CCNC: 24 U/L (ref 10–44)
ANION GAP SERPL CALC-SCNC: 10 MMOL/L (ref 8–16)
AST SERPL-CCNC: 27 U/L (ref 10–40)
BASOPHILS # BLD AUTO: 0.06 K/UL (ref 0–0.2)
BASOPHILS NFR BLD: 0.9 % (ref 0–1.9)
BILIRUB SERPL-MCNC: 0.7 MG/DL (ref 0.1–1)
BUN SERPL-MCNC: 17 MG/DL (ref 8–23)
CALCIUM SERPL-MCNC: 9.1 MG/DL (ref 8.7–10.5)
CHLORIDE SERPL-SCNC: 104 MMOL/L (ref 95–110)
CHOLEST SERPL-MCNC: 150 MG/DL (ref 120–199)
CHOLEST/HDLC SERPL: 2.1 {RATIO} (ref 2–5)
CO2 SERPL-SCNC: 25 MMOL/L (ref 23–29)
CREAT SERPL-MCNC: 1 MG/DL (ref 0.5–1.4)
DIFFERENTIAL METHOD: ABNORMAL
EOSINOPHIL # BLD AUTO: 0.4 K/UL (ref 0–0.5)
EOSINOPHIL NFR BLD: 5.4 % (ref 0–8)
ERYTHROCYTE [DISTWIDTH] IN BLOOD BY AUTOMATED COUNT: 12.3 % (ref 11.5–14.5)
EST. GFR  (AFRICAN AMERICAN): >60 ML/MIN/1.73 M^2
EST. GFR  (NON AFRICAN AMERICAN): 54.5 ML/MIN/1.73 M^2
GLUCOSE SERPL-MCNC: 176 MG/DL (ref 70–110)
HCT VFR BLD AUTO: 39.7 % (ref 37–48.5)
HDLC SERPL-MCNC: 71 MG/DL (ref 40–75)
HDLC SERPL: 47.3 % (ref 20–50)
HGB BLD-MCNC: 12.6 G/DL (ref 12–16)
IMM GRANULOCYTES # BLD AUTO: 0.01 K/UL (ref 0–0.04)
IMM GRANULOCYTES NFR BLD AUTO: 0.1 % (ref 0–0.5)
LDLC SERPL CALC-MCNC: 58.2 MG/DL (ref 63–159)
LYMPHOCYTES # BLD AUTO: 1.8 K/UL (ref 1–4.8)
LYMPHOCYTES NFR BLD: 26 % (ref 18–48)
MCH RBC QN AUTO: 33.6 PG (ref 27–31)
MCHC RBC AUTO-ENTMCNC: 31.7 G/DL (ref 32–36)
MCV RBC AUTO: 106 FL (ref 82–98)
MONOCYTES # BLD AUTO: 0.7 K/UL (ref 0.3–1)
MONOCYTES NFR BLD: 9.5 % (ref 4–15)
NEUTROPHILS # BLD AUTO: 4 K/UL (ref 1.8–7.7)
NEUTROPHILS NFR BLD: 58.1 % (ref 38–73)
NONHDLC SERPL-MCNC: 79 MG/DL
NRBC BLD-RTO: 0 /100 WBC
PLATELET # BLD AUTO: 92 K/UL (ref 150–350)
PMV BLD AUTO: 11.2 FL (ref 9.2–12.9)
POTASSIUM SERPL-SCNC: 4.2 MMOL/L (ref 3.5–5.1)
PROT SERPL-MCNC: 7.2 G/DL (ref 6–8.4)
RBC # BLD AUTO: 3.75 M/UL (ref 4–5.4)
SODIUM SERPL-SCNC: 139 MMOL/L (ref 136–145)
TRIGL SERPL-MCNC: 104 MG/DL (ref 30–150)
TSH SERPL DL<=0.005 MIU/L-ACNC: 0.98 UIU/ML (ref 0.4–4)
WBC # BLD AUTO: 6.85 K/UL (ref 3.9–12.7)

## 2020-08-04 PROCEDURE — 84443 ASSAY THYROID STIM HORMONE: CPT

## 2020-08-04 PROCEDURE — 85025 COMPLETE CBC W/AUTO DIFF WBC: CPT

## 2020-08-04 PROCEDURE — 80053 COMPREHEN METABOLIC PANEL: CPT

## 2020-08-04 PROCEDURE — 36415 COLL VENOUS BLD VENIPUNCTURE: CPT | Mod: PN

## 2020-08-04 PROCEDURE — 83036 HEMOGLOBIN GLYCOSYLATED A1C: CPT

## 2020-08-04 PROCEDURE — 80061 LIPID PANEL: CPT

## 2020-08-05 LAB
ESTIMATED AVG GLUCOSE: 160 MG/DL (ref 68–131)
HBA1C MFR BLD HPLC: 7.2 % (ref 4–5.6)

## 2020-08-06 ENCOUNTER — TELEPHONE (OUTPATIENT)
Dept: HEMATOLOGY/ONCOLOGY | Facility: CLINIC | Age: 75
End: 2020-08-06

## 2020-08-06 LAB
BODY SITE - BONE MARROW: NORMAL
CLINICAL DIAGNOSIS - BONE MARROW: NORMAL
FLOW CYTOMETRY ANTIBODIES ANALYZED - BONE MARROW: NORMAL
FLOW CYTOMETRY COMMENT - BONE MARROW: NORMAL
FLOW CYTOMETRY INTERPRETATION - BONE MARROW: NORMAL

## 2020-08-06 NOTE — TELEPHONE ENCOUNTER
----- Message from Dori Wright NP sent at 8/6/2020  2:36 PM CDT -----    ----- Message -----  From: [employer] [email or web address] Interface  Sent: 8/3/2020   3:38 PM CDT  To: Dori Wright NP

## 2020-08-06 NOTE — TELEPHONE ENCOUNTER
I agree. I would wait for the MDS panel to result. Unless you have a p53 mutation or something else poor risk, I'd just watch with labs given her age.

## 2020-08-07 ENCOUNTER — TELEPHONE (OUTPATIENT)
Dept: HEMATOLOGY/ONCOLOGY | Facility: CLINIC | Age: 75
End: 2020-08-07

## 2020-08-07 NOTE — TELEPHONE ENCOUNTER
Mariajose,  Thanks for your input. Does path send for MDS panel automatically or do we have to call them   Also who follows MDS patients in your group.

## 2020-08-07 NOTE — TELEPHONE ENCOUNTER
----- Message from Martin Jordan sent at 8/7/2020 11:18 AM CDT -----  Contact: Patient  Returning a call     Caller name:: Pt    Who Left Message for Patient:: Dr Burleson    Communication preference:: [phone]    Additional info::

## 2020-08-07 NOTE — TELEPHONE ENCOUNTER
Called patient back to review bone marrow results. Which reveals MDS,   MDS panel is pending, appears to have been sent on 8/3, takes about 7-10 days.  Once final testing is back, will have her see Hematology

## 2020-08-14 NOTE — PROGRESS NOTES
The MDS FISH still hasn't results. You can redraw the T cell gene rearrangement from the peripheral blood. I know [facility] has a back log of FISH by several weeks right now.

## 2020-08-17 ENCOUNTER — TELEPHONE (OUTPATIENT)
Dept: HEMATOLOGY/ONCOLOGY | Facility: CLINIC | Age: 75
End: 2020-08-17

## 2020-08-17 NOTE — TELEPHONE ENCOUNTER
Spoke with patient.  Informed her bmbx complete results are still pending.  Mds panel.  She thanked nurse.

## 2020-08-17 NOTE — TELEPHONE ENCOUNTER
----- Message from Joslyn Thompson sent at 8/17/2020  1:46 PM CDT -----  Pt would like to be called back regarding test result    Pt can be reached at [phone]

## 2020-08-21 ENCOUNTER — OFFICE VISIT (OUTPATIENT)
Dept: UROGYNECOLOGY | Facility: CLINIC | Age: 75
End: 2020-08-21
Payer: MEDICARE

## 2020-08-21 VITALS
DIASTOLIC BLOOD PRESSURE: 62 MMHG | BODY MASS INDEX: 24.26 KG/M2 | HEIGHT: 62 IN | SYSTOLIC BLOOD PRESSURE: 112 MMHG | WEIGHT: 131.81 LBS

## 2020-08-21 DIAGNOSIS — Z46.89 PESSARY MAINTENANCE: ICD-10-CM

## 2020-08-21 DIAGNOSIS — N81.11 MIDLINE CYSTOCELE: ICD-10-CM

## 2020-08-21 DIAGNOSIS — N95.2 VAGINAL ATROPHY: ICD-10-CM

## 2020-08-21 DIAGNOSIS — N39.46 MIXED STRESS AND URGE URINARY INCONTINENCE: ICD-10-CM

## 2020-08-21 DIAGNOSIS — Z12.4 ENCOUNTER FOR SCREENING FOR CERVICAL CANCER: Primary | ICD-10-CM

## 2020-08-21 DIAGNOSIS — N81.6 RECTOCELE: ICD-10-CM

## 2020-08-21 DIAGNOSIS — N39.0 RECURRENT UTI: ICD-10-CM

## 2020-08-21 PROCEDURE — 88175 CYTOPATH C/V AUTO FLUID REDO: CPT

## 2020-08-21 PROCEDURE — 99999 PR PBB SHADOW E&M-EST. PATIENT-LVL V: CPT | Mod: PBBFAC,,, | Performed by: NURSE PRACTITIONER

## 2020-08-21 PROCEDURE — 87624 HPV HI-RISK TYP POOLED RSLT: CPT

## 2020-08-21 PROCEDURE — 99999 PR PBB SHADOW E&M-EST. PATIENT-LVL V: ICD-10-PCS | Mod: PBBFAC,,, | Performed by: NURSE PRACTITIONER

## 2020-08-21 PROCEDURE — 99215 OFFICE O/P EST HI 40 MIN: CPT | Mod: PBBFAC | Performed by: NURSE PRACTITIONER

## 2020-08-21 PROCEDURE — 99213 OFFICE O/P EST LOW 20 MIN: CPT | Mod: S$PBB,,, | Performed by: NURSE PRACTITIONER

## 2020-08-21 PROCEDURE — 99213 PR OFFICE/OUTPT VISIT, EST, LEVL III, 20-29 MIN: ICD-10-PCS | Mod: S$PBB,,, | Performed by: NURSE PRACTITIONER

## 2020-08-21 NOTE — PROGRESS NOTES
Urogyn follow up  08/21/2020  .  [facility] [id number]   [address] [address]    June Hardik Judd  [id number]  1945      Anayeli Judd is a 75 y.o.  here for a urogyn follow up for pessary care    Last HPI from  03/09/2020  1)  Mixed urinary incontinence, urge > stress:    --initially improved with pessary.    --discharge has resolved.  2)  Vaginal atrophy (dryness):    --using estrogen cream  --using osphena  3)  Incomplete bladder emptying:  --can't tell if pessary helping; does have moderate 2nd void volumes by straining.  4)  Frequent UTIs (bladder infections):  --control diabetes  --work on improving bladder emptying  --has not been taking 250 mg maintenance dose  5)  Constipation intermittent with loose stool:  --better with 4-5 tsps/day  --not needing stool softener  --continue use miralax daily--see if taking 1/2 dose is sufficient  6)post menopausal bleeding  --ecc done last visit.      05/15/2020  1)  Mixed urinary incontinence, urge > stress:    --initially improved with pessary.       2)  Vaginal atrophy (dryness):    --using estrogen cream  --using osphena      3)  Incomplete bladder emptying:  --can't tell if pessary helping; does have moderate 2nd void volumes by straining.       4)  Frequent UTIs (bladder infections):  --control diabetes  --work on improving bladder emptying  --has not been taking 250 mg maintenance dose     5)  Constipation intermittent with loose stool:  --better with 4-5 tsps/day  --not needing stool softener  --continue use miralax daily--see if taking 1/2 dose is sufficient     6)post menopausal bleeding  --spotted yesterday     Changes since last visit  1)  Mixed urinary incontinence, urge > stress:    --initially improved with pessary.       2)  Vaginal atrophy (dryness):    --using estrogen cream      3)  Incomplete bladder emptying:  --improved with pessary       4)  Frequent UTIs (bladder infections):  --denies symptoms  at this time  --taking keflex 250 mg daily     5)  Constipation intermittent with loose stool:  --low fiber diet due to bowel obstruction     6)post menopausal bleeding  --resolved       2020  Pelvic ultrasound  FINDINGS:  The uterus is absent.  There is nonvisualization of the ovaries.  No sonographic abnormality is demonstrated.   Impression:   No sonographic abnormality in this hysterectomy patient.  There is nonvisualization of the ovaries.     2020  ECC  - Squamous mucosa with acute inflammation.   - No dysplasia/carcinoma identified.   - Detached fragments of acute and chronic inflammatory debris with   multinucleated giant cells.   - No fungal organisms identified by PASF stain    2018  Cysto with Togami  FINDINGS:  Dome, anterior, posterior, lateral walls and bladder base free of urothelial abnormalities. Right and left ureteral orifices in the normal postion and configuration, both effluxed clear urine.  Bladder neck and urethra were normal    11/15/2018  Retroperitoneal ultrasound  Right kidney: Normal in size measuring 10 cm. Increased cortical echogenicity.  No loss of corticomedullary distinction. Resistive index measures 0.70.  No mass. No renal stone. Mild hydronephrosis.  Left kidney: Normal in size measuring 10.1 cm. Increased cortical echogenicity.  No loss of corticomedullary distinction. Resistive index measures 0.74.  No mass. No renal stone. Mildly prominent collecting system, possibly representing mild hydronephrosis.  Urinary bladder is distended but otherwise unremarkable.  Both ureteral jets are seen.  Impression  Mild bilateral hydronephrosis, right side greater than left, similar to recent CT.  Distended urinary bladder with preservation of bilateral ureteral jets.    Past Medical History:   Diagnosis Date    Abdominal pain     Allergic rhinitis     Arthritis     Blood transfusion     during delivery and     Bowel obstruction     Cervical radiculopathy      followed by dr reynolds    Colon cancer     transverse colon; resected; Stage IIA (pT3 pN0 MX)    Diarrhea     Family history of breast cancer     Family history of colon cancer     Fatty liver     GERD (gastroesophageal reflux disease)     History of shingles     Hyperlipidemia     Hypothyroidism     Irritable bowel syndrome     Microscopic colitis     treated     Raynaud phenomenon     Raynaud's disease     Type 2 diabetes mellitus        Past Surgical History:   Procedure Laterality Date    APPENDECTOMY      BACK SURGERY      CARPAL TUNNEL RELEASE      bilateral      SECTION      CHOLECYSTECTOMY  1965    COLECTOMY  2011    Transverse colon resection by Dr. Aguirre    COLONOSCOPY N/A 2017    Procedure: COLONOSCOPY;  Surgeon: Manjit Alvarez MD;  Location: [facility] (4TH FLR);  Service: Endoscopy;  Laterality: N/A;    COLONOSCOPY N/A 2019    Procedure: COLONOSCOPY;  Surgeon: Ramiro Jefferson MD;  Location: [facility] (4TH FLR);  Service: Endoscopy;  Laterality: N/A;  PM prep    ENDOSCOPIC ULTRASOUND OF UPPER GASTROINTESTINAL TRACT N/A 2018    Procedure: ULTRASOUND, UPPER GI TRACT, ENDOSCOPIC;  Surgeon: Jose Hess MD;  Location: [facility];  Service: Endoscopy;  Laterality: N/A;    ENDOSCOPIC ULTRASOUND OF UPPER GASTROINTESTINAL TRACT N/A 2019    Procedure: ULTRASOUND, UPPER GI TRACT, ENDOSCOPIC;  Surgeon: Jose Hess MD;  Location: [facility] (2ND FLR);  Service: Endoscopy;  Laterality: N/A;    ESOPHAGOGASTRODUODENOSCOPY N/A 2018    Procedure: EGD (ESOPHAGOGASTRODUODENOSCOPY);  Surgeon: Angelo Reynolds MD;  Location: [facility] (2ND FLR);  Service: Endoscopy;  Laterality: N/A;    ESOPHAGOGASTRODUODENOSCOPY N/A 2019    Procedure: EGD (ESOPHAGOGASTRODUODENOSCOPY);  Surgeon: Ramiro Jefferson MD;  Location: [facility] (4TH FLR);  Service: Endoscopy;  Laterality: N/A;    EYE SURGERY      Cataract Removal    HYSTERECTOMY       posterolateral fusion with autograft bone and Eun mineralized bone matrix  2/1/13    at [facility] for lumbar spine stenosis    TOE SURGERY      TONSILLECTOMY      TRIGGER FINGER RELEASE         Current Outpatient Medications   Medication Sig    atorvastatin (LIPITOR) 40 MG tablet TAKE 1 TABLET(40 MG) BY MOUTH EVERY DAY    azelastine (ASTELIN) 137 mcg (0.1 %) nasal spray 2 sprays (274 mcg total) by Nasal route 2 (two) times daily.    blood sugar diagnostic Strp Dispense AMGas contour strips; test blood sugar once daily    blood-glucose meter (CONTOUR METER) kit Please dispense VersionOne Contour meter and supplies Use as instructed Test Blood sugar once daily    cephALEXin (KEFLEX) 250 MG capsule TAKE 1 CAPSULE BY MOUTH  DAILY    docusate sodium (COLACE) 100 MG capsule Take 1 capsule (100 mg total) by mouth 2 (two) times daily.    doxycycline (MONODOX) 100 MG capsule EMPTY CONTENTS OF 2 CAPSULES INTO NASAL IRRIGATION SYSTEM, ADD DISTILLED WATER, SALT PACK, MIX & IRRIGATE. PERFORM 2 TIMES DAILY    DULoxetine (CYMBALTA) 30 MG capsule Take 1 capsule (30 mg total) by mouth 2 (two) times daily.    fluticasone (FLONASE) 50 mcg/actuation nasal spray 2 sprays by Each Nare route daily as needed.     FREESTYLE EFREN 10 DAY READER Misc TEST as directed    FREESTYLE EFREN 10 DAY SENSOR Kit Check blood glucose every day. Please dispense 6 mos supply.    gabapentin (NEURONTIN) 600 MG tablet Take 2 tablets (1,200 mg total) by mouth 2 (two) times daily.    gentamicin (GARAMYCIN) 40 mg/mL injection EMPTY CONTENTS OF 1 VIAL INTO NASAL IRRIGATION SYSTEM, ADD DISTILLED WATER, SALT PACK, MIX & IRRIGATE PERFORM 2 TIMES DAILY    hydrocortisone 2.5 % cream     lancets (ONE TOUCH ULTRASOFT LANCETS) Misc Test blood sugar once daily    levothyroxine (SYNTHROID) 100 MCG tablet Take 1 tablet (100 mcg total) by mouth every morning.    magnesium 30 mg Tab Take 500 capsules by mouth. Patient's taking magnesium 500mg QD    metFORMIN  "(GLUCOPHAGE-XR) 750 MG XR 24hr tablet TAKE 1 TABLET(750 MG) BY MOUTH TWICE DAILY WITH MEALS    methocarbamol (ROBAXIN) 500 MG Tab Take 500 mg by mouth every evening.    mirabegron (MYRBETRIQ) 50 mg Tb24 Take 1 tablet (50 mg total) by mouth once daily.    ondansetron (ZOFRAN) 4 MG tablet Take 1 tablet (4 mg total) by mouth every 8 (eight) hours as needed for Nausea.    ondansetron (ZOFRAN-ODT) 8 MG TbDL     ospemifene (OSPHENA) 60 mg Tab Take 60 mg by mouth once daily.    pantoprazole (PROTONIX) 40 MG tablet Take 1 tablet (40 mg total) by mouth once daily.    PREMARIN vaginal cream PLACE 0.5 GRAM VAGINALLY EVERY EVENING    vitamin D 1000 units Tab Take 185 mg by mouth once daily. D3    valACYclovir (VALTREX) 1000 MG tablet Take 1 tablet (1,000 mg total) by mouth 3 (three) times daily before meals.     No current facility-administered medications for this visit.        Well woman:  Pap test: post ANGEL.  History of abnormal paps: Yes--had ANGEL.  History of STIs:  No  Mammogram: Date of last: 05/2019 normal--patient reports-- ordered  Colonoscopy: Date of last: 2019.  Result: normal.  Repeat due: 3-5 years    DEXA:  Date of last: 05/17/2019 normal     ROS:  As per HPI.      Exam  /62 (BP Location: Right arm, Patient Position: Sitting)   Ht 5' 2" (1.575 m)   Wt 59.8 kg (131 lb 13.4 oz)   LMP  (LMP Unknown)   BMI 24.11 kg/m²   General: alert and oriented, no acute distress.   Respiratory: normal respiratory effort  Abd: soft, non-tender, non-distended    Pelvic  Ext. Genitalia:external genitalia no lesions. Atrophic. Normal bartholin's and skeens glands. Small scratch noted on R labia near introitus.  Vagina: + atrophy.  No lesions noted.  No discharge noted. #2 ring with support pessary in place  Cervix: No lesions  Uterus:  absent   Urethra: no masses or tenderness  Urethral meatus: no lesions, caruncle or prolapse.      Pessary removed without difficulty.  Washed with soap and water. Reinserted without " complaints.      Impression  1. Encounter for screening for cervical cancer   Liquid-Based Pap Smear, Screening    HPV High Risk Genotypes, PCR   2. Mixed stress and urge urinary incontinence     3. Midline cystocele     4. Rectocele     5. Vaginal atrophy     6. Recurrent UTI     7. Pessary maintenance       We reviewed the above issues and discussed options for short-term versus long-term management of her problems.   Plan:     1)  Mixed urinary incontinence, urge > stress:    --control diabetes  --Empty bladder every 3 hours.  Empty well: wait a minute, lean forward on toilet.    --Avoid dietary irritants (see sheet).  Keep diary x 3-5 days to determine your irritants.  --consider PT in future   --URGE: consider medication (Mirabegron due to SBO).  Takes 2-4 weeks to see if will have effect.  For dry mouth: get sour, sugar free lozenge or gum.    --STRESS:  Pessary vs. Sling.   --#2 ring with support pessary     2)  Vaginal atrophy (dryness):  Use 1 gm vaginal estrogen cream nightly x 1 week.    --stop osphena  --may also reduce bladder infection rate     3)  post menopausal bleeding  --ECC bx--normal    4)  Incomplete bladder emptying:  --resolved with pessary     5)  Frequent UTIs (bladder infections):  --control diabetes  --work on improving bladder emptying  --continue Keflex 250 mg daily  --If you feel like you have a UTI, please call our office so that we can place an order for you to drop off a urine specimen at the closest [facility] lab (we will arrange).                --We will call in antibiotics for you to start right after you drop off specimen.                --In this way, we can determine:                           1)  Do you have a UTI?                            2) If you have a UTI, is it sensitive to the antibiotics we prescribed?   --follow UTI prevention tips (see attached)  --control bowel movements/fecal cross-contamination  --treat vaginal atrophy (dryness)  --empty bladder before and after  intercourse  --consider need for further evaluation (pelvic imaging and cystoscopy) if issue persists; 618 CT A/P  normal     6)  Constipation intermittent with loose stool:  --follow GI recommendations due to small bowel obstruction    7)  RTC for pc in 3 months    30 minutes were spent in face to face time with this patient  90 % of this time was spent in counseling and/or coordination of care    KENNEDY Petit-BC [facility]  Division of Female Pelvic Medicine and Reconstructive Surgery  Department of Obstetrics & Gynecology

## 2020-08-21 NOTE — PATIENT INSTRUCTIONS
1)  Mixed urinary incontinence, urge > stress:    --control diabetes  --Empty bladder every 3 hours.  Empty well: wait a minute, lean forward on toilet.    --Avoid dietary irritants (see sheet).  Keep diary x 3-5 days to determine your irritants.  --consider PT in future   --URGE: consider medication (Mirabegron due to SBO).  Takes 2-4 weeks to see if will have effect.  For dry mouth: get sour, sugar free lozenge or gum.    --STRESS:  Pessary vs. Sling.   --#2 ring with support pessary     2)  Vaginal atrophy (dryness):  Use 1 gm vaginal estrogen cream nightly x 1 week.    --stop osphena  --may also reduce bladder infection rate     3)  post menopausal bleeding  --ECC bx--normal    4)  Incomplete bladder emptying:  --resolved with pessary     5)  Frequent UTIs (bladder infections):  --control diabetes  --work on improving bladder emptying  --continue Keflex 250 mg daily  --If you feel like you have a UTI, please call our office so that we can place an order for you to drop off a urine specimen at the closest [facility] lab (we will arrange).                --We will call in antibiotics for you to start right after you drop off specimen.                --In this way, we can determine:                           1)  Do you have a UTI?                            2) If you have a UTI, is it sensitive to the antibiotics we prescribed?   --follow UTI prevention tips (see attached)  --control bowel movements/fecal cross-contamination  --treat vaginal atrophy (dryness)  --empty bladder before and after intercourse  --consider need for further evaluation (pelvic imaging and cystoscopy) if issue persists; 618 CT A/P  normal     6)  Constipation intermittent with loose stool:  --follow GI recommendations due to small bowel obstruction    7)  RTC for pc in 3 months

## 2020-08-25 ENCOUNTER — TELEPHONE (OUTPATIENT)
Dept: HEMATOLOGY/ONCOLOGY | Facility: CLINIC | Age: 75
End: 2020-08-25

## 2020-08-25 LAB
COMMENT: NORMAL
FINAL PATHOLOGIC DIAGNOSIS: NORMAL
GROSS: NORMAL
Lab: NORMAL
MAYO MISCELLANEOUS RESULT (REF): NORMAL
MAYO MISCELLANEOUS RESULT (REF): NORMAL
MICROSCOPIC EXAM: NORMAL
SUPPLEMENTAL DIAGNOSIS: NORMAL

## 2020-08-25 NOTE — TELEPHONE ENCOUNTER
----- Message from Natalie Burleson MD sent at 8/25/2020  3:17 PM CDT -----  MDS panel has finally been traced and it reveals normal Karyotype.   Do you want to follow her?  ----- Message -----  From: Kimberly Reynolds RN  Sent: 8/25/2020   9:45 AM CDT  To: Natalie Burleson MD    I did call yesterday and it was pending---  I will call again now  ----- Message -----  From: Natalie Burleson MD  Sent: 8/25/2020   9:12 AM CDT  To: SADIQ Hightower,  Did u call path to see if MDS panel is back  ----- Message -----  From: Mariajose Rodriguez MD  Sent: 8/14/2020   1:33 PM CDT  To: Natalie Burleson MD    The MDS FISH still hasn't results. You can redraw the T cell gene rearrangement from the peripheral blood. I know [facility] has a back log of FISH by several weeks right now.

## 2020-08-25 NOTE — TELEPHONE ENCOUNTER
----- Message from Taylor Raymond sent at 8/25/2020  1:09 PM CDT -----  Regarding: Testing  Contact: pt  Reason: Calling to speak with staff pertaining to test for blood cells sent off.    Communication: [phone]

## 2020-08-25 NOTE — TELEPHONE ENCOUNTER
Called patient, MDS panel reveals Normal Karyotype, No risk karyotype, most likely will be followed. Reviewed above with her. Discussed brifly with Dr. Rodriguez as well. Will have her be seen in Heematology clinic

## 2020-08-27 ENCOUNTER — EXTERNAL HOME HEALTH (OUTPATIENT)
Dept: HOME HEALTH SERVICES | Facility: HOSPITAL | Age: 75
End: 2020-08-27

## 2020-08-28 LAB
HPV HR 12 DNA SPEC QL NAA+PROBE: NEGATIVE
HPV16 AG SPEC QL: NEGATIVE
HPV18 DNA SPEC QL NAA+PROBE: NEGATIVE

## 2020-08-31 ENCOUNTER — TELEPHONE (OUTPATIENT)
Dept: HEMATOLOGY/ONCOLOGY | Facility: CLINIC | Age: 75
End: 2020-08-31

## 2020-08-31 NOTE — TELEPHONE ENCOUNTER
----- Message from Amy Garibay sent at 8/31/2020  1:56 PM CDT -----  Regarding: Appt  Pt is calling to reschedule tomorrow's appt because she is not feeling well and would like to reschedule to next week.      She can be reached at [phone].    Thank you.

## 2020-09-05 LAB
FINAL PATHOLOGIC DIAGNOSIS: NORMAL
Lab: NORMAL

## 2020-09-07 NOTE — PROGRESS NOTES
"[facility]  [facility]  Hematology/Medical Oncology Clinic       PATIENT: Anayeli Judd  MRN: [id number]  DATE: 9/8/2020    Reason for referral: MDS    Chief Complaint: Consult and Abnormal Lab      History of present illness   Initial History:   Ms. Judd is a 75 y.o. female who is referred to us for thrombocytopenia and bone marrow biopsy suggestive of MDS.   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 She remained in remission since then.   She was noted to have thrombocytopenia on routine labs, and bone marrow biopsy was performed revealing "mildly hypercellular marrow (30-40%) with trilineage hematopoiesis and mild dyserythropoiesis with increased ring sideroblasts (6%), Blasts in marrow were 0.7%" Chromosomal analysis revealed normal karyotype 46,XX with no evidence of acquired clonal abnormality. Negative FISH results for -5/5q-, -7/7q-, +8, and -20/20q-    Flow cytometric analysis of  bone marrow shows populations of polyclonal B   lymphocytes. T lymphocytes show increased CD4:CD8 ratio (10:1). TCR rearrangement test was not performed due technical issues with the samples. NGS reported normal.     Today, she presented with her daughter. She complaints of abdominal pain, which had been chronic since she had colectomy (hx of obstructions before), she reports intermittent epigastric abdominal pain, that sometimes become diffuse. She also reports sinus pain and pressure, which has been bothering her since March. She has occasional headaches. She reported two episodes on bleeding in her pad (last time about 6 months ago). She is not sure of the source though.   She also has been feeling fatigues, will less energy and less interest in doing activities she used to enjoy before. This has been for several months.   She denies purpura, spontaneous bruising. No change in stool color, no blood in urine. She " denies prolonged bleeding.  She denies weight lossm fever, night sweats, VIVAR.       Past Medical History:   Past Medical History:   Diagnosis Date    Abdominal pain     Allergic rhinitis     Arthritis     Blood transfusion     during delivery and     Bowel obstruction     Cervical radiculopathy     followed by dr cloud    Colon cancer     transverse colon; resected; Stage IIA (pT3 pN0 MX)    Diarrhea     Family history of breast cancer     Family history of colon cancer     Fatty liver     GERD (gastroesophageal reflux disease)     History of shingles     Hyperlipidemia     Hypothyroidism     Irritable bowel syndrome     Microscopic colitis     treated     Raynaud phenomenon     Raynaud's disease     Type 2 diabetes mellitus        Past Surgical HIstory:   Past Surgical History:   Procedure Laterality Date    APPENDECTOMY      BACK SURGERY      CARPAL TUNNEL RELEASE      bilateral      SECTION      CHOLECYSTECTOMY  1965    COLECTOMY  2011    Transverse colon resection by Dr. Aguirre    COLONOSCOPY N/A 2017    Procedure: COLONOSCOPY;  Surgeon: Manjit Alvarez MD;  Location: [facility]);  Service: Endoscopy;  Laterality: N/A;    COLONOSCOPY N/A 2019    Procedure: COLONOSCOPY;  Surgeon: Ramiro Jefferson MD;  Location: [facility] (4TH FLR);  Service: Endoscopy;  Laterality: N/A;  PM prep    ENDOSCOPIC ULTRASOUND OF UPPER GASTROINTESTINAL TRACT N/A 2018    Procedure: ULTRASOUND, UPPER GI TRACT, ENDOSCOPIC;  Surgeon: Jose Hess MD;  Location: [facility];  Service: Endoscopy;  Laterality: N/A;    ENDOSCOPIC ULTRASOUND OF UPPER GASTROINTESTINAL TRACT N/A 2019    Procedure: ULTRASOUND, UPPER GI TRACT, ENDOSCOPIC;  Surgeon: Jose Hess MD;  Location: [facility]);  Service: Endoscopy;  Laterality: N/A;    ESOPHAGOGASTRODUODENOSCOPY N/A 2018    Procedure: EGD (ESOPHAGOGASTRODUODENOSCOPY);  Surgeon: Angelo LARRY  MD Rodolfo;  Location: [facility] (2ND FLR);  Service: Endoscopy;  Laterality: N/A;    ESOPHAGOGASTRODUODENOSCOPY N/A 6/26/2019    Procedure: EGD (ESOPHAGOGASTRODUODENOSCOPY);  Surgeon: Ramiro Jefferson MD;  Location: [facility] (4TH FLR);  Service: Endoscopy;  Laterality: N/A;    EYE SURGERY      Cataract Removal    HYSTERECTOMY      posterolateral fusion with autograft bone and Aiken mineralized bone matrix  2/1/13    at [facility] for lumbar spine stenosis    TOE SURGERY      TONSILLECTOMY      TRIGGER FINGER RELEASE         Family History:   Family History   Problem Relation Age of Onset    Heart disease Father 50        Mi age 50    Colon cancer Father     Bladder Cancer Mother         non smoker    Cataracts Mother     Glaucoma Mother     Heart disease Mother     Hyperlipidemia Mother     Kidney disease Mother     Breast cancer Sister 79    Arthritis Daughter     Asthma Daughter     Depression Daughter     Hypertension Daughter     Stroke Daughter 40    Arthritis Brother     Colon cancer Brother 70    Alcohol abuse Brother     Parkinsonism Brother     Alcohol abuse Brother     Depression Daughter     Celiac disease Neg Hx     Cirrhosis Neg Hx     Colon polyps Neg Hx     Crohn's disease Neg Hx     Cystic fibrosis Neg Hx     Esophageal cancer Neg Hx     Hemochromatosis Neg Hx     Inflammatory bowel disease Neg Hx     Irritable bowel syndrome Neg Hx     Liver cancer Neg Hx     Liver disease Neg Hx     Rectal cancer Neg Hx     Stomach cancer Neg Hx     Ulcerative colitis Neg Hx     Eliazar's disease Neg Hx     Amblyopia Neg Hx     Blindness Neg Hx     Macular degeneration Neg Hx     Retinal detachment Neg Hx     Strabismus Neg Hx     Melanoma Neg Hx        Social History:  reports that she has never smoked. She has never used smokeless tobacco. She reports current alcohol use. She reports that she does not use drugs.    Allergies:  Review of patient's allergies indicates:    Allergen Reactions    Dilaudid [hydromorphone (bulk)] Other (See Comments)     Oversedating, head burning. Pt prefers to avoid.       Codeine Nausea And Vomiting     Other reaction(s): Itching    Percocet  [oxycodone-acetaminophen]      Other reaction(s): Itching    Sulfa (sulfonamide antibiotics)      Other reaction(s): Nausea  Other reaction(s): Itching       Medications:  Current Outpatient Medications   Medication Sig Dispense Refill    atorvastatin (LIPITOR) 40 MG tablet TAKE 1 TABLET(40 MG) BY MOUTH EVERY DAY 90 tablet 1    azelastine (ASTELIN) 137 mcg (0.1 %) nasal spray 2 sprays (274 mcg total) by Nasal route 2 (two) times daily. 30 mL 1    blood sugar diagnostic Strp Dispense UniPay contour strips; test blood sugar once daily 100 strip 11    blood-glucose meter (CONTOUR METER) kit Please dispense Ter[employer]ia Contour meter and supplies Use as instructed Test Blood sugar once daily 1 each 0    cephALEXin (KEFLEX) 250 MG capsule TAKE 1 CAPSULE BY MOUTH  DAILY 30 capsule 0    docusate sodium (COLACE) 100 MG capsule Take 1 capsule (100 mg total) by mouth 2 (two) times daily. 60 capsule 0    DULoxetine (CYMBALTA) 30 MG capsule Take 1 capsule (30 mg total) by mouth 2 (two) times daily. 180 capsule 1    fluticasone (FLONASE) 50 mcg/actuation nasal spray 2 sprays by Each Nare route daily as needed.   0    FREESTYLE EFREN 10 DAY READER Misc TEST as directed 3 each 3    FREESTYLE EFREN 10 DAY SENSOR Kit Check blood glucose every day. Please dispense 6 mos supply. 6 kit 3    gabapentin (NEURONTIN) 600 MG tablet Take 2 tablets (1,200 mg total) by mouth 2 (two) times daily. 360 tablet 1    gentamicin (GARAMYCIN) 40 mg/mL injection EMPTY CONTENTS OF 1 VIAL INTO NASAL IRRIGATION SYSTEM, ADD DISTILLED WATER, SALT PACK, MIX & IRRIGATE PERFORM 2 TIMES DAILY      hydrocortisone 2.5 % cream   0    lancets (ONE TOUCH ULTRASOFT LANCETS) Misc Test blood sugar once daily 200 each 11    levothyroxine (SYNTHROID) 100  MCG tablet Take 1 tablet (100 mcg total) by mouth every morning. 90 tablet 1    magnesium 30 mg Tab Take 500 capsules by mouth. Patient's taking magnesium 500mg QD      methocarbamol (ROBAXIN) 500 MG Tab Take 500 mg by mouth every evening.      mirabegron (MYRBETRIQ) 50 mg Tb24 Take 1 tablet (50 mg total) by mouth once daily. 90 tablet 1    ondansetron (ZOFRAN) 4 MG tablet Take 1 tablet (4 mg total) by mouth every 8 (eight) hours as needed for Nausea. 12 tablet 0    ondansetron (ZOFRAN-ODT) 8 MG TbDL       pantoprazole (PROTONIX) 40 MG tablet TAKE 1 TABLET BY MOUTH  DAILY 90 tablet 3    PREMARIN vaginal cream PLACE 0.5 GRAM VAGINALLY EVERY EVENING 30 g 3    valACYclovir (VALTREX) 1000 MG tablet Take 1 tablet (1,000 mg total) by mouth 3 (three) times daily before meals. 21 tablet 0    vitamin D 1000 units Tab Take 185 mg by mouth once daily. D3      metFORMIN (GLUCOPHAGE-XR) 750 MG XR 24hr tablet TAKE 1 TABLET(750 MG) BY MOUTH TWICE DAILY WITH MEALS (Patient not taking: Reported on 9/8/2020) 180 tablet 1     No current facility-administered medications for this visit.        Review of Systems   Constitutional: Positive for activity change (less interested in activities she used to enjoy) and fatigue. Negative for appetite change, chills, fever and unexpected weight change.   HENT: Positive for sinus pressure and sinus pain. Negative for tinnitus and trouble swallowing.    Eyes: Negative.    Respiratory: Negative for cough, shortness of breath, wheezing and stridor.    Cardiovascular: Negative for chest pain, palpitations and leg swelling.   Gastrointestinal: Positive for abdominal pain. Negative for anal bleeding, blood in stool, constipation and diarrhea.   Endocrine: Negative.    Genitourinary: Positive for urgency and vaginal pain.   Musculoskeletal: Positive for back pain. Negative for myalgias and neck stiffness.   Skin: Negative.    Allergic/Immunologic: Negative.    Neurological: Positive for  "headaches. Negative for syncope and speech difficulty.   Hematological: Negative.        ECOG Performance Status: 2   Objective:      Vitals:   Vitals:    09/08/20 1115   BP: (!) 113/56   BP Location: Right arm   Patient Position: Sitting   BP Method: Large (Automatic)   Pulse: 75   Resp: 16   Temp: 97.9 °F (36.6 °C)   TempSrc: Oral   SpO2: 98%   Weight: 59.4 kg (130 lb 13.5 oz)   Height: 5' 2" (1.575 m)       Physical Exam  Constitutional:       General: She is not in acute distress.     Appearance: She is not ill-appearing.   HENT:      Head: Normocephalic and atraumatic.      Mouth/Throat:      Pharynx: Oropharynx is clear.   Eyes:      Pupils: Pupils are equal, round, and reactive to light.   Cardiovascular:      Rate and Rhythm: Normal rate and regular rhythm.      Heart sounds: No murmur. No friction rub. No gallop.    Pulmonary:      Effort: Pulmonary effort is normal. No respiratory distress.      Breath sounds: Normal breath sounds. No wheezing or rales.   Abdominal:      General: There is no distension.      Palpations: Abdomen is soft. There is no mass.      Tenderness: There is no abdominal tenderness.   Musculoskeletal:         General: No swelling.   Skin:     Coloration: Skin is not jaundiced or pale.      Findings: No rash.   Neurological:      General: No focal deficit present.      Mental Status: She is oriented to person, place, and time.         Laboratory Data:  No visits with results within 1 Week(s) from this visit.   Latest known visit with results is:   Office Visit on 08/21/2020   Component Date Value Ref Range Status    Final Pathologic Diagnosis 08/21/2020    Final                    Value:Specimen Adequacy  Satisfactory for interpretation. Endocervical component is absent.  Beverly Category  Negative for intraepithelial lesion or malignancy.      Interp By THERESA Vaca, SCT (ASCP), Signed on 09/05/2020 at 13:58    Disclaimer 08/21/2020    Final                    Value:The Pap " smear is a screening test that aids in the detection of cervical  cancer and cancer precursors. Both false positive and false negative results  can occur. The test should be used at regular intervals, and positive results  should be confirmed before definitive therapy.  This liquid based specimen is processed using the  or  Thin PrepPAP  System. This specimen has been analyzed by the ThinPrep Imaging System  ([employer]), an automated imaging and review system which assists  the laboratory in evaluating cells on ThinPrep PAP tests. Following automated  imaging, selected fields from every slide are reviewed by a cytotechnologist  and/or pathologist.      HPV other High Risk types, PCR 08/21/2020 Negative  Negative Final    Comment: Other HPV genotypes include:   31,33,35,39,45,51,52,56,58,59,66 and 68.      HPV High Risk type 16, PCR 08/21/2020 Negative  Negative Final    HPV High Risk type 18, PCR 08/21/2020 Negative  Negative Final     WBC   Date Value Ref Range Status   08/04/2020 6.85 3.90 - 12.70 K/uL Final     Hemoglobin   Date Value Ref Range Status   08/04/2020 12.6 12.0 - 16.0 g/dL Final     POC Hematocrit   Date Value Ref Range Status   03/11/2020 21 (L) 36 - 54 %PCV Final     Hematocrit   Date Value Ref Range Status   08/04/2020 39.7 37.0 - 48.5 % Final     Platelets   Date Value Ref Range Status   08/04/2020 92 (L) 150 - 350 K/uL Final     Gran # (ANC)   Date Value Ref Range Status   08/04/2020 4.0 1.8 - 7.7 K/uL Final     Gran%   Date Value Ref Range Status   08/04/2020 58.1 38.0 - 73.0 % Final     Sodium   Date Value Ref Range Status   08/04/2020 139 136 - 145 mmol/L Final     Potassium   Date Value Ref Range Status   08/04/2020 4.2 3.5 - 5.1 mmol/L Final     BUN, Bld   Date Value Ref Range Status   08/04/2020 17 8 - 23 mg/dL Final           Assessment:       1. MDS (myelodysplastic syndrome), low grade    2. Anemia, unspecified       Mrs Judd is presenting with thrombocytopenia,  which on reviewing her chart has been since 2017, her counts were between . She also had periods with anemia, mostly normocytic (except in the last 3 months; macrocytic).  Based on the facts that she has dysplasia in single lineage, 1 - 2 peripheral cytopenia, her marrow blasts count is 0.7%, Sideroblasts 6%, with normal karyotype and negative FISH, she would be classified as MDS-SLD.   Her R-IPSS score is 1.5 and risk category of very low.   Her most recent Hgb is 12.5, which wouldn't explain her sense of fatigue.     Plan:     1. Will rule out other causes for her thrombocytopenia :   -     HEPATITIS PANEL; Future  -     HEPATITIS B CORE ANTIBODY, TOTAL; Future  -     Folate; Future  -     Vitamin B12; Future  -     HIV 1/2 Ag/Ab (4th Gen); Future  -     COPPER, SERUM; Future  -     Iron and TIBC; Future  -     TSH; Future  -     Ferritin; Future  -     CBC auto differential; Future  Giving her very low risk, being asymptomatic and her age, we recommend no treatment and monitoring every three month for the first year with CBC.     I advised them to get a psychiatrist evaluation to rule out major depression as a cause of her fatigue and sense of loss of interest.     Their questions were answered to their satisfaction.     The above was staffed and discussed with Dr. Michael Cabezas MD  PGY4  Hematology/Oncology fellow

## 2020-09-08 ENCOUNTER — PATIENT MESSAGE (OUTPATIENT)
Dept: UROGYNECOLOGY | Facility: CLINIC | Age: 75
End: 2020-09-08

## 2020-09-08 ENCOUNTER — OFFICE VISIT (OUTPATIENT)
Dept: HEMATOLOGY/ONCOLOGY | Facility: CLINIC | Age: 75
End: 2020-09-08
Payer: MEDICARE

## 2020-09-08 VITALS
OXYGEN SATURATION: 98 % | HEIGHT: 62 IN | SYSTOLIC BLOOD PRESSURE: 113 MMHG | BODY MASS INDEX: 24.07 KG/M2 | TEMPERATURE: 98 F | WEIGHT: 130.81 LBS | RESPIRATION RATE: 16 BRPM | DIASTOLIC BLOOD PRESSURE: 56 MMHG | HEART RATE: 75 BPM

## 2020-09-08 DIAGNOSIS — D64.9 ANEMIA, UNSPECIFIED: ICD-10-CM

## 2020-09-08 DIAGNOSIS — D46.Z MDS (MYELODYSPLASTIC SYNDROME), LOW GRADE: Primary | ICD-10-CM

## 2020-09-08 PROCEDURE — 99215 OFFICE O/P EST HI 40 MIN: CPT | Mod: PBBFAC | Performed by: STUDENT IN AN ORGANIZED HEALTH CARE EDUCATION/TRAINING PROGRAM

## 2020-09-08 PROCEDURE — 99999 PR PBB SHADOW E&M-EST. PATIENT-LVL V: ICD-10-PCS | Mod: PBBFAC,GC,, | Performed by: STUDENT IN AN ORGANIZED HEALTH CARE EDUCATION/TRAINING PROGRAM

## 2020-09-08 PROCEDURE — 99214 OFFICE O/P EST MOD 30 MIN: CPT | Mod: S$PBB,GC,, | Performed by: STUDENT IN AN ORGANIZED HEALTH CARE EDUCATION/TRAINING PROGRAM

## 2020-09-08 PROCEDURE — 99999 PR PBB SHADOW E&M-EST. PATIENT-LVL V: CPT | Mod: PBBFAC,GC,, | Performed by: STUDENT IN AN ORGANIZED HEALTH CARE EDUCATION/TRAINING PROGRAM

## 2020-09-08 PROCEDURE — 99214 PR OFFICE/OUTPT VISIT, EST, LEVL IV, 30-39 MIN: ICD-10-PCS | Mod: S$PBB,GC,, | Performed by: STUDENT IN AN ORGANIZED HEALTH CARE EDUCATION/TRAINING PROGRAM

## 2020-09-08 NOTE — Clinical Note
Labs (she wants them done at [place]), and return to follow-up with me in 3 month with cbc.   Thanks

## 2020-09-09 ENCOUNTER — LAB VISIT (OUTPATIENT)
Dept: LAB | Facility: HOSPITAL | Age: 75
End: 2020-09-09
Payer: MEDICARE

## 2020-09-09 DIAGNOSIS — D64.9 ANEMIA, UNSPECIFIED: ICD-10-CM

## 2020-09-09 DIAGNOSIS — D46.Z MDS (MYELODYSPLASTIC SYNDROME), LOW GRADE: ICD-10-CM

## 2020-09-09 LAB
FERRITIN SERPL-MCNC: 745 NG/ML (ref 20–300)
FOLATE SERPL-MCNC: 5.2 NG/ML (ref 4–24)
IRON SERPL-MCNC: 153 UG/DL (ref 30–160)
SATURATED IRON: 44 % (ref 20–50)
TOTAL IRON BINDING CAPACITY: 349 UG/DL (ref 250–450)
TRANSFERRIN SERPL-MCNC: 236 MG/DL (ref 200–375)
TSH SERPL DL<=0.005 MIU/L-ACNC: 0.53 UIU/ML (ref 0.4–4)
VIT B12 SERPL-MCNC: 692 PG/ML (ref 210–950)

## 2020-09-09 PROCEDURE — 86704 HEP B CORE ANTIBODY TOTAL: CPT

## 2020-09-09 PROCEDURE — 84443 ASSAY THYROID STIM HORMONE: CPT

## 2020-09-09 PROCEDURE — 83540 ASSAY OF IRON: CPT

## 2020-09-09 PROCEDURE — 82746 ASSAY OF FOLIC ACID SERUM: CPT | Mod: GA

## 2020-09-09 PROCEDURE — 82728 ASSAY OF FERRITIN: CPT

## 2020-09-09 PROCEDURE — 36415 COLL VENOUS BLD VENIPUNCTURE: CPT | Mod: PN

## 2020-09-09 PROCEDURE — 82525 ASSAY OF COPPER: CPT

## 2020-09-09 PROCEDURE — 82607 VITAMIN B-12: CPT | Mod: GA

## 2020-09-09 PROCEDURE — 80074 ACUTE HEPATITIS PANEL: CPT | Mod: GA

## 2020-09-09 PROCEDURE — 86703 HIV-1/HIV-2 1 RESULT ANTBDY: CPT

## 2020-09-10 LAB
HAV IGM SERPL QL IA: NEGATIVE
HBV CORE AB SERPL QL IA: NEGATIVE
HBV CORE IGM SERPL QL IA: NEGATIVE
HBV SURFACE AG SERPL QL IA: NEGATIVE
HCV AB SERPL QL IA: NEGATIVE
HIV 1+2 AB+HIV1 P24 AG SERPL QL IA: NEGATIVE

## 2020-09-22 ENCOUNTER — DOCUMENT SCAN (OUTPATIENT)
Dept: HOME HEALTH SERVICES | Facility: HOSPITAL | Age: 75
End: 2020-09-22

## 2020-09-28 RX ORDER — CEPHALEXIN 250 MG/1
250 CAPSULE ORAL DAILY
Qty: 30 CAPSULE | Refills: 0 | Status: SHIPPED | OUTPATIENT
Start: 2020-09-28 | End: 2020-10-27

## 2020-09-28 NOTE — TELEPHONE ENCOUNTER
----- Message from Keya Scott sent at 9/28/2020 11:13 AM CDT -----  Regarding: Refill  Can the clinic reply in [facility]: No      Please refill the medication(s) listed below. Please call the patient when the prescription(s) is ready for  at the phone number [phone]    Medication #1:cephALEXin (KEFLEX) 250 MG capsule    Medication #2:      Preferred Pharmacy:[facility] - [facility] [address] [place] [address]

## 2020-10-22 NOTE — ASSESSMENT & PLAN NOTE
- not SMA syndrome per vascular surgery  - EGD complete and negative  - continue BID PPI  - BID dicyclomine per GI recs  - patient has also been on rifaximin as a trial or possible small intestinal bacterial overgrowth (SIBO)  - recommended course for rifaximin for SIBO is about 14 days; will continue trial   - discomfort improved   - patient had distended bladder on imaging with history of urinary retention but voiding well with low post void residuals; questionable if distended bladder was causing some discomfort that could be contributing to her symptoms along with the UTI?--> discussed with urology and will need to follow up with Dr. Valenzuela   - continue to monitor    Male Completion Statement: After discussing his treatment course we decided to discontinue isotretinoin therapy at this time. He shouldn't donate blood for one month after the last dose. He should call with any new symptoms of depression.

## 2020-12-02 ENCOUNTER — OFFICE VISIT (OUTPATIENT)
Dept: UROGYNECOLOGY | Facility: CLINIC | Age: 75
End: 2020-12-02
Payer: MEDICARE

## 2020-12-02 VITALS
BODY MASS INDEX: 23.82 KG/M2 | HEART RATE: 77 BPM | WEIGHT: 129.44 LBS | DIASTOLIC BLOOD PRESSURE: 60 MMHG | HEIGHT: 62 IN | SYSTOLIC BLOOD PRESSURE: 118 MMHG

## 2020-12-02 DIAGNOSIS — Z46.89 PESSARY MAINTENANCE: ICD-10-CM

## 2020-12-02 DIAGNOSIS — N39.0 RECURRENT UTI: ICD-10-CM

## 2020-12-02 DIAGNOSIS — N81.11 MIDLINE CYSTOCELE: Primary | ICD-10-CM

## 2020-12-02 DIAGNOSIS — N81.6 RECTOCELE: ICD-10-CM

## 2020-12-02 DIAGNOSIS — N95.2 VAGINAL ATROPHY: ICD-10-CM

## 2020-12-02 DIAGNOSIS — N39.46 MIXED STRESS AND URGE URINARY INCONTINENCE: ICD-10-CM

## 2020-12-02 PROCEDURE — 51701 INSERT BLADDER CATHETER: CPT | Mod: S$PBB,,, | Performed by: NURSE PRACTITIONER

## 2020-12-02 PROCEDURE — 99213 OFFICE O/P EST LOW 20 MIN: CPT | Mod: 25,S$PBB,, | Performed by: NURSE PRACTITIONER

## 2020-12-02 PROCEDURE — 51701 INSERT BLADDER CATHETER: CPT | Mod: PBBFAC | Performed by: NURSE PRACTITIONER

## 2020-12-02 PROCEDURE — 99999 PR PBB SHADOW E&M-EST. PATIENT-LVL V: CPT | Mod: PBBFAC,,, | Performed by: NURSE PRACTITIONER

## 2020-12-02 PROCEDURE — 99215 OFFICE O/P EST HI 40 MIN: CPT | Mod: PBBFAC | Performed by: NURSE PRACTITIONER

## 2020-12-02 PROCEDURE — 99213 PR OFFICE/OUTPT VISIT, EST, LEVL III, 20-29 MIN: ICD-10-PCS | Mod: 25,S$PBB,, | Performed by: NURSE PRACTITIONER

## 2020-12-02 PROCEDURE — 99999 PR PBB SHADOW E&M-EST. PATIENT-LVL V: ICD-10-PCS | Mod: PBBFAC,,, | Performed by: NURSE PRACTITIONER

## 2020-12-02 PROCEDURE — 87086 URINE CULTURE/COLONY COUNT: CPT

## 2020-12-02 PROCEDURE — 51701 PR INSERTION OF NON-INDWELLING BLADDER CATHETERIZATION FOR RESIDUAL UR: ICD-10-PCS | Mod: S$PBB,,, | Performed by: NURSE PRACTITIONER

## 2020-12-02 NOTE — PROGRESS NOTES
Urogyn follow up  12/02/2020  .  [facility] UROGYNECOLOGY-[id number]   [address] [address]    June Hardik Judd  [id number]  1945      Anayeli Judd is a 75 y.o.  here for a urogyn follow up for pessary care    Last HPI from  03/09/2020  1)  Mixed urinary incontinence, urge > stress:    --initially improved with pessary.    --discharge has resolved.  2)  Vaginal atrophy (dryness):    --using estrogen cream  --using osphena  3)  Incomplete bladder emptying:  --can't tell if pessary helping; does have moderate 2nd void volumes by straining.  4)  Frequent UTIs (bladder infections):  --control diabetes  --work on improving bladder emptying  --has not been taking 250 mg maintenance dose  5)  Constipation intermittent with loose stool:  --better with 4-5 tsps/day  --not needing stool softener  --continue use miralax daily--see if taking 1/2 dose is sufficient  6)post menopausal bleeding  --ecc done last visit.      05/15/2020  1)  Mixed urinary incontinence, urge > stress:    --initially improved with pessary.       2)  Vaginal atrophy (dryness):    --using estrogen cream  --using osphena      3)  Incomplete bladder emptying:  --can't tell if pessary helping; does have moderate 2nd void volumes by straining.       4)  Frequent UTIs (bladder infections):  --control diabetes  --work on improving bladder emptying  --has not been taking 250 mg maintenance dose     5)  Constipation intermittent with loose stool:  --better with 4-5 tsps/day  --not needing stool softener  --continue use miralax daily--see if taking 1/2 dose is sufficient     6)post menopausal bleeding  --spotted yesterday     Changes since last visit  1)  Mixed urinary incontinence, urge > stress:    -- improved with pessary and myrbetriq 50 mg  --voiding every 4 hours during the day     2)  Vaginal atrophy (dryness):    --using estrogen cream      3)  Incomplete bladder emptying:  --improved with pessary    4)  Frequent UTIs  (bladder infections):  --denies symptoms at this time  --taking keflex 250 mg daily     5)  Constipation intermittent with loose stool:  --low fiber diet due to bowel obstruction  --scant BRB with bowel movement once-- felt like she had  fissure  --denies fecal smearing/ diarrhea     6)post menopausal bleeding  --resolved       02/26/2020  Pelvic ultrasound  FINDINGS:  The uterus is absent.  There is nonvisualization of the ovaries.  No sonographic abnormality is demonstrated.   Impression:   No sonographic abnormality in this hysterectomy patient.  There is nonvisualization of the ovaries.     03/06/2020  ECC  - Squamous mucosa with acute inflammation.   - No dysplasia/carcinoma identified.   - Detached fragments of acute and chronic inflammatory debris with   multinucleated giant cells.   - No fungal organisms identified by PASF stain    11/26/2018  Cysto with Togami  FINDINGS:  Dome, anterior, posterior, lateral walls and bladder base free of urothelial abnormalities. Right and left ureteral orifices in the normal postion and configuration, both effluxed clear urine.  Bladder neck and urethra were normal    11/15/2018  Retroperitoneal ultrasound  Right kidney: Normal in size measuring 10 cm. Increased cortical echogenicity.  No loss of corticomedullary distinction. Resistive index measures 0.70.  No mass. No renal stone. Mild hydronephrosis.  Left kidney: Normal in size measuring 10.1 cm. Increased cortical echogenicity.  No loss of corticomedullary distinction. Resistive index measures 0.74.  No mass. No renal stone. Mildly prominent collecting system, possibly representing mild hydronephrosis.  Urinary bladder is distended but otherwise unremarkable.  Both ureteral jets are seen.  Impression  Mild bilateral hydronephrosis, right side greater than left, similar to recent CT.  Distended urinary bladder with preservation of bilateral ureteral jets.    Past Medical History:   Diagnosis Date    Abdominal pain      Allergic rhinitis     Arthritis     Blood transfusion     during delivery and     Bowel obstruction     Cervical radiculopathy     followed by dr reynolds    Colon cancer     transverse colon; resected; Stage IIA (pT3 pN0 MX)    Diarrhea     Family history of breast cancer     Family history of colon cancer     Fatty liver     GERD (gastroesophageal reflux disease)     History of shingles     Hyperlipidemia     Hypothyroidism     Irritable bowel syndrome     Microscopic colitis     treated     Raynaud phenomenon     Raynaud's disease     Type 2 diabetes mellitus        Past Surgical History:   Procedure Laterality Date    APPENDECTOMY      BACK SURGERY      CARPAL TUNNEL RELEASE      bilateral      SECTION      CHOLECYSTECTOMY  1965    COLECTOMY  2011    Transverse colon resection by Dr. Aguirre    COLONOSCOPY N/A 2017    Procedure: COLONOSCOPY;  Surgeon: Manjit Alvarez MD;  Location: [facility] (4TH FLR);  Service: Endoscopy;  Laterality: N/A;    COLONOSCOPY N/A 2019    Procedure: COLONOSCOPY;  Surgeon: Ramiro Jefferson MD;  Location: [facility] (4TH FLR);  Service: Endoscopy;  Laterality: N/A;  PM prep    ENDOSCOPIC ULTRASOUND OF UPPER GASTROINTESTINAL TRACT N/A 2018    Procedure: ULTRASOUND, UPPER GI TRACT, ENDOSCOPIC;  Surgeon: Jose Hess MD;  Location[facility];  Service: Endoscopy;  Laterality: N/A;    ENDOSCOPIC ULTRASOUND OF UPPER GASTROINTESTINAL TRACT N/A 2019    Procedure: ULTRASOUND, UPPER GI TRACT, ENDOSCOPIC;  Surgeon: Jose Hess MD;  Location: [facility] (2ND FLR);  Service: Endoscopy;  Laterality: N/A;    ESOPHAGOGASTRODUODENOSCOPY N/A 2018    Procedure: EGD (ESOPHAGOGASTRODUODENOSCOPY);  Surgeon: Angelo Reynolds MD;  Location: [facility] (2ND FLR);  Service: Endoscopy;  Laterality: N/A;    ESOPHAGOGASTRODUODENOSCOPY N/A 2019    Procedure: EGD (ESOPHAGOGASTRODUODENOSCOPY);  Surgeon: Ramiro POWERS  MD Li;  Location: [facility] (4TH FLR);  Service: Endoscopy;  Laterality: N/A;    EYE SURGERY      Cataract Removal    HYSTERECTOMY      posterolateral fusion with autograft bone and Eun mineralized bone matrix  2/1/13    at [facility] for lumbar spine stenosis    TOE SURGERY      TONSILLECTOMY      TRIGGER FINGER RELEASE         Current Outpatient Medications   Medication Sig    atorvastatin (LIPITOR) 40 MG tablet TAKE 1 TABLET BY MOUTH  DAILY    azelastine (ASTELIN) 137 mcg (0.1 %) nasal spray 2 sprays (274 mcg total) by Nasal route 2 (two) times daily.    blood sugar diagnostic Strp Dispense Coravin contour strips; test blood sugar once daily    blood-glucose meter (CONTOUR METER) kit Please dispense [employer] Contour meter and supplies Use as instructed Test Blood sugar once daily    cephALEXin (KEFLEX) 250 MG capsule TAKE 1 CAPSULE BY MOUTH  ONCE DAILY    conjugated estrogens (PREMARIN) vaginal cream PLACE 0.5 GRAM VAGINALLY twice a week.    docusate sodium (COLACE) 100 MG capsule Take 1 capsule (100 mg total) by mouth 2 (two) times daily.    DULoxetine (CYMBALTA) 30 MG capsule Take 1 capsule (30 mg total) by mouth 2 (two) times daily.    fluticasone (FLONASE) 50 mcg/actuation nasal spray 2 sprays by Each Nare route daily as needed.     FREESTYLE EFREN 10 DAY READER Misc TEST as directed    FREESTYLE EFREN 10 DAY SENSOR Kit Check blood glucose every day. Please dispense 6 mos supply.    hydrocortisone 2.5 % cream     lancets (ONE TOUCH ULTRASOFT LANCETS) Misc Test blood sugar once daily    levothyroxine (SYNTHROID) 100 MCG tablet TAKE 1 TABLET BY MOUTH IN  THE MORNING    magnesium 30 mg Tab Take 500 capsules by mouth. Patient's taking magnesium 500mg QD    methocarbamoL (ROBAXIN) 500 MG Tab Take 1 tablet (500 mg total) by mouth every evening.    mirabegron (MYRBETRIQ) 50 mg Tb24 Take 1 tablet (50 mg total) by mouth once daily.    ondansetron (ZOFRAN) 4 MG tablet Take 1 tablet (4 mg total)  "by mouth every 8 (eight) hours as needed for Nausea.    ondansetron (ZOFRAN-ODT) 8 MG TbDL     pantoprazole (PROTONIX) 40 MG tablet TAKE 1 TABLET BY MOUTH  DAILY    valACYclovir (VALTREX) 1000 MG tablet Take 1 tablet (1,000 mg total) by mouth 3 (three) times daily before meals.    vitamin D 1000 units Tab Take 185 mg by mouth once daily. D3    gabapentin (NEURONTIN) 600 MG tablet Take 2 tablets (1,200 mg total) by mouth 2 (two) times daily.    gentamicin (GARAMYCIN) 40 mg/mL injection EMPTY CONTENTS OF 1 VIAL INTO NASAL IRRIGATION SYSTEM, ADD DISTILLED WATER, SALT PACK, MIX & IRRIGATE PERFORM 2 TIMES DAILY    metFORMIN (GLUCOPHAGE-XR) 750 MG XR 24hr tablet TAKE 1 TABLET(750 MG) BY MOUTH TWICE DAILY WITH MEALS (Patient not taking: Reported on 9/8/2020)    TRADJENTA 5 mg Tab tablet Take 1 tablet by mouth Daily.     No current facility-administered medications for this visit.        Well woman:  Pap test: post ANGEL.  History of abnormal paps: Yes--had ANGEL.  History of STIs:  No  Mammogram: Date of last: 05/2019 normal--patient reports-- ordered  Colonoscopy: Date of last: 2019.  Result: normal.  Repeat due: 3-5 years    DEXA:  Date of last: 05/17/2019 normal     ROS:  As per HPI.      Exam  /60   Pulse 77   Ht 5' 2" (1.575 m)   Wt 58.7 kg (129 lb 6.6 oz)   LMP  (LMP Unknown)   BMI 23.67 kg/m²   General: alert and oriented, no acute distress.   Respiratory: normal respiratory effort  Abd: soft, non-tender, non-distended    Pelvic  Ext. Genitalia:external genitalia no lesions. Atrophic. Normal bartholin's and skeens glands. Small scratch noted on R labia near introitus.  Vagina: + atrophy.  No lesions noted.  No discharge noted. #2 ring with support pessary in place  Cervix: No lesions  Uterus:  absent   Urethra: no masses or tenderness  Urethral meatus: no lesions, caruncle or prolapse.      Pessary removed without difficulty.  Washed with soap and water. Reinserted without " complaints.      Impression  1. Midline cystocele     2. Rectocele     3. Mixed stress and urge urinary incontinence     4. Recurrent UTI  Urine culture   5. Vaginal atrophy     6. Pessary maintenance       We reviewed the above issues and discussed options for short-term versus long-term management of her problems.   Plan:     1)  Mixed urinary incontinence, urge > stress:    --control diabetes  --Empty bladder every 3 hours.  Empty well: wait a minute, lean forward on toilet.    --Avoid dietary irritants (see sheet).  Keep diary x 3-5 days to determine your irritants.  --consider PT in future   --URGE: consider medication (Mirabegron due to SBO).  Takes 2-4 weeks to see if will have effect.  For dry mouth: get sour, sugar free lozenge or gum.    --STRESS:  Pessary vs. Sling.   --#2 ring with support pessary     2)  Vaginal atrophy (dryness):  Use 1 gm vaginal estrogen cream nightly x 1 week.    --stop osphena  --may also reduce bladder infection rate     3)  post menopausal bleeding  --ECC bx--normal    4)  Incomplete bladder emptying:  --resolved with pessary     5)  Frequent UTIs (bladder infections):  --control diabetes  --work on improving bladder emptying  --urine culture today-- if negative will consider stopping keflex  --for now continue Keflex 250 mg daily  --If you feel like you have a UTI, please call our office so that we can place an order for you to drop off a urine specimen at the closest [facility] lab (we will arrange).                --We will call in antibiotics for you to start right after you drop off specimen.                --In this way, we can determine:                           1)  Do you have a UTI?                            2) If you have a UTI, is it sensitive to the antibiotics we prescribed?   --follow UTI prevention tips (see attached)  --control bowel movements/fecal cross-contamination  --treat vaginal atrophy (dryness)  --empty bladder before and after intercourse  --consider need  for further evaluation (pelvic imaging and cystoscopy) if issue persists; 618 CT A/P  normal     6)  Constipation intermittent with loose stool:  --follow GI recommendations due to small bowel obstruction    7)  RTC for pc in 3 months    30 minutes were spent in face to face time with this patient  90 % of this time was spent in counseling and/or coordination of care    Blnaka Blair, KENNEDY-BC [facility]  Division of Female Pelvic Medicine and Reconstructive Surgery  Department of Obstetrics & Gynecology

## 2020-12-02 NOTE — PATIENT INSTRUCTIONS
1)  Mixed urinary incontinence, urge > stress:    --control diabetes  --Empty bladder every 3 hours.  Empty well: wait a minute, lean forward on toilet.    --Avoid dietary irritants (see sheet).  Keep diary x 3-5 days to determine your irritants.  --consider PT in future   --URGE: consider medication (Mirabegron due to SBO).  Takes 2-4 weeks to see if will have effect.  For dry mouth: get sour, sugar free lozenge or gum.    --STRESS:  Pessary vs. Sling.   --#2 ring with support pessary     2)  Vaginal atrophy (dryness):  Use 1 gm vaginal estrogen cream nightly x 1 week.    --stop osphena  --may also reduce bladder infection rate     3)  post menopausal bleeding  --ECC bx--normal    4)  Incomplete bladder emptying:  --resolved with pessary     5)  Frequent UTIs (bladder infections):  --control diabetes  --work on improving bladder emptying  --urine culture today-- if negative will consider stopping keflex  --for now continue Keflex 250 mg daily  --If you feel like you have a UTI, please call our office so that we can place an order for you to drop off a urine specimen at the closest [facility] lab (we will arrange).                --We will call in antibiotics for you to start right after you drop off specimen.                --In this way, we can determine:                           1)  Do you have a UTI?                            2) If you have a UTI, is it sensitive to the antibiotics we prescribed?   --follow UTI prevention tips (see attached)  --control bowel movements/fecal cross-contamination  --treat vaginal atrophy (dryness)  --empty bladder before and after intercourse  --consider need for further evaluation (pelvic imaging and cystoscopy) if issue persists; 618 CT A/P  normal     6)  Constipation intermittent with loose stool:  --follow GI recommendations due to small bowel obstruction    7)  RTC for pc in 3 months

## 2020-12-04 ENCOUNTER — PATIENT MESSAGE (OUTPATIENT)
Dept: UROGYNECOLOGY | Facility: CLINIC | Age: 75
End: 2020-12-04

## 2020-12-04 LAB — BACTERIA UR CULT: NO GROWTH

## 2020-12-08 ENCOUNTER — OFFICE VISIT (OUTPATIENT)
Dept: HEMATOLOGY/ONCOLOGY | Facility: CLINIC | Age: 75
End: 2020-12-08
Payer: MEDICARE

## 2020-12-08 ENCOUNTER — LAB VISIT (OUTPATIENT)
Dept: LAB | Facility: HOSPITAL | Age: 75
End: 2020-12-08
Payer: MEDICARE

## 2020-12-08 VITALS
DIASTOLIC BLOOD PRESSURE: 61 MMHG | HEART RATE: 71 BPM | WEIGHT: 132.63 LBS | TEMPERATURE: 97 F | SYSTOLIC BLOOD PRESSURE: 110 MMHG | BODY MASS INDEX: 24.41 KG/M2 | RESPIRATION RATE: 16 BRPM | HEIGHT: 62 IN | OXYGEN SATURATION: 97 %

## 2020-12-08 DIAGNOSIS — D46.Z MDS (MYELODYSPLASTIC SYNDROME), LOW GRADE: ICD-10-CM

## 2020-12-08 DIAGNOSIS — K56.609 SBO (SMALL BOWEL OBSTRUCTION): ICD-10-CM

## 2020-12-08 DIAGNOSIS — D46.Z MDS (MYELODYSPLASTIC SYNDROME), LOW GRADE: Primary | ICD-10-CM

## 2020-12-08 DIAGNOSIS — R79.89 ELEVATED SERUM CREATININE: ICD-10-CM

## 2020-12-08 DIAGNOSIS — D69.6 THROMBOCYTOPENIA: ICD-10-CM

## 2020-12-08 DIAGNOSIS — I82.629 ACUTE DEEP VEIN THROMBOSIS (DVT) OF OTHER VEIN OF UPPER EXTREMITY, UNSPECIFIED LATERALITY: ICD-10-CM

## 2020-12-08 LAB
BASOPHILS # BLD AUTO: 0.05 K/UL (ref 0–0.2)
BASOPHILS NFR BLD: 0.8 % (ref 0–1.9)
DIFFERENTIAL METHOD: ABNORMAL
EOSINOPHIL # BLD AUTO: 0.3 K/UL (ref 0–0.5)
EOSINOPHIL NFR BLD: 4.1 % (ref 0–8)
ERYTHROCYTE [DISTWIDTH] IN BLOOD BY AUTOMATED COUNT: 11.9 % (ref 11.5–14.5)
HCT VFR BLD AUTO: 39.1 % (ref 37–48.5)
HGB BLD-MCNC: 12.5 G/DL (ref 12–16)
IMM GRANULOCYTES # BLD AUTO: 0.01 K/UL (ref 0–0.04)
IMM GRANULOCYTES NFR BLD AUTO: 0.2 % (ref 0–0.5)
LYMPHOCYTES # BLD AUTO: 1.7 K/UL (ref 1–4.8)
LYMPHOCYTES NFR BLD: 26.1 % (ref 18–48)
MCH RBC QN AUTO: 33.2 PG (ref 27–31)
MCHC RBC AUTO-ENTMCNC: 32 G/DL (ref 32–36)
MCV RBC AUTO: 104 FL (ref 82–98)
MONOCYTES # BLD AUTO: 0.7 K/UL (ref 0.3–1)
MONOCYTES NFR BLD: 10.8 % (ref 4–15)
NEUTROPHILS # BLD AUTO: 3.8 K/UL (ref 1.8–7.7)
NEUTROPHILS NFR BLD: 58 % (ref 38–73)
NRBC BLD-RTO: 0 /100 WBC
PLATELET # BLD AUTO: 102 K/UL (ref 150–350)
PMV BLD AUTO: 11.3 FL (ref 9.2–12.9)
RBC # BLD AUTO: 3.76 M/UL (ref 4–5.4)
WBC # BLD AUTO: 6.55 K/UL (ref 3.9–12.7)

## 2020-12-08 PROCEDURE — 99215 OFFICE O/P EST HI 40 MIN: CPT | Mod: S$PBB,GC,, | Performed by: STUDENT IN AN ORGANIZED HEALTH CARE EDUCATION/TRAINING PROGRAM

## 2020-12-08 PROCEDURE — 36415 COLL VENOUS BLD VENIPUNCTURE: CPT

## 2020-12-08 PROCEDURE — 85025 COMPLETE CBC W/AUTO DIFF WBC: CPT

## 2020-12-08 PROCEDURE — 99999 PR PBB SHADOW E&M-EST. PATIENT-LVL V: ICD-10-PCS | Mod: PBBFAC,GC,, | Performed by: STUDENT IN AN ORGANIZED HEALTH CARE EDUCATION/TRAINING PROGRAM

## 2020-12-08 PROCEDURE — 99999 PR PBB SHADOW E&M-EST. PATIENT-LVL V: CPT | Mod: PBBFAC,GC,, | Performed by: STUDENT IN AN ORGANIZED HEALTH CARE EDUCATION/TRAINING PROGRAM

## 2020-12-08 PROCEDURE — 99215 OFFICE O/P EST HI 40 MIN: CPT | Mod: PBBFAC | Performed by: STUDENT IN AN ORGANIZED HEALTH CARE EDUCATION/TRAINING PROGRAM

## 2020-12-08 PROCEDURE — 99215 PR OFFICE/OUTPT VISIT, EST, LEVL V, 40-54 MIN: ICD-10-PCS | Mod: S$PBB,GC,, | Performed by: STUDENT IN AN ORGANIZED HEALTH CARE EDUCATION/TRAINING PROGRAM

## 2020-12-08 RX ORDER — PREDNISOLONE ACETATE 10 MG/ML
SUSPENSION/ DROPS OPHTHALMIC
COMMUNITY
Start: 2020-12-02 | End: 2021-02-26

## 2020-12-08 RX ORDER — AZITHROMYCIN 250 MG/1
TABLET, FILM COATED ORAL
COMMUNITY
Start: 2020-12-02 | End: 2021-02-26

## 2020-12-08 RX ORDER — OFLOXACIN 3 MG/ML
SOLUTION AURICULAR (OTIC)
COMMUNITY
Start: 2020-12-02 | End: 2021-02-26
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
## 2021-01-13 ENCOUNTER — TELEPHONE (OUTPATIENT)
Dept: GASTROENTEROLOGY | Facility: CLINIC | Age: 76
End: 2021-01-13

## 2021-01-17 ENCOUNTER — IMMUNIZATION (OUTPATIENT)
Dept: INTERNAL MEDICINE | Facility: CLINIC | Age: 76
End: 2021-01-17
Payer: MEDICARE

## 2021-01-17 DIAGNOSIS — Z23 NEED FOR VACCINATION: Primary | ICD-10-CM

## 2021-01-17 PROCEDURE — 91300 COVID-19, MRNA, LNP-S, PF, 30 MCG/0.3 ML DOSE VACCINE: CPT | Mod: PBBFAC,PO

## 2021-02-08 ENCOUNTER — IMMUNIZATION (OUTPATIENT)
Dept: INTERNAL MEDICINE | Facility: CLINIC | Age: 76
End: 2021-02-08
Payer: MEDICARE

## 2021-02-08 DIAGNOSIS — Z23 NEED FOR VACCINATION: Primary | ICD-10-CM

## 2021-02-08 PROCEDURE — 91300 COVID-19, MRNA, LNP-S, PF, 30 MCG/0.3 ML DOSE VACCINE: CPT | Mod: PBBFAC | Performed by: FAMILY MEDICINE

## 2021-02-08 PROCEDURE — 0002A COVID-19, MRNA, LNP-S, PF, 30 MCG/0.3 ML DOSE VACCINE: CPT | Mod: PBBFAC | Performed by: FAMILY MEDICINE

## 2021-02-26 ENCOUNTER — HOSPITAL ENCOUNTER (INPATIENT)
Facility: HOSPITAL | Age: 76
LOS: 2 days | Discharge: HOME | DRG: 390 | End: 2021-02-28
Attending: EMERGENCY MEDICINE | Admitting: SURGERY
Payer: MEDICARE

## 2021-02-26 DIAGNOSIS — R10.9 ABDOMINAL PAIN: Primary | ICD-10-CM

## 2021-02-26 DIAGNOSIS — R10.84 GENERALIZED ABDOMINAL PAIN: ICD-10-CM

## 2021-02-26 DIAGNOSIS — K56.609 SBO (SMALL BOWEL OBSTRUCTION): ICD-10-CM

## 2021-02-26 DIAGNOSIS — K56.609 SMALL BOWEL OBSTRUCTION: ICD-10-CM

## 2021-02-26 LAB
ALBUMIN SERPL BCP-MCNC: 4.1 G/DL (ref 3.5–5.2)
ALP SERPL-CCNC: 62 U/L (ref 55–135)
ALT SERPL W/O P-5'-P-CCNC: 23 U/L (ref 10–44)
ANION GAP SERPL CALC-SCNC: 9 MMOL/L (ref 8–16)
AST SERPL-CCNC: 24 U/L (ref 10–40)
BACTERIA #/AREA URNS AUTO: ABNORMAL /HPF
BASOPHILS # BLD AUTO: 0.04 K/UL (ref 0–0.2)
BASOPHILS NFR BLD: 0.6 % (ref 0–1.9)
BILIRUB SERPL-MCNC: 1.2 MG/DL (ref 0.1–1)
BILIRUB UR QL STRIP: NEGATIVE
BUN SERPL-MCNC: 15 MG/DL (ref 8–23)
CALCIUM SERPL-MCNC: 10 MG/DL (ref 8.7–10.5)
CHLORIDE SERPL-SCNC: 101 MMOL/L (ref 95–110)
CLARITY UR REFRACT.AUTO: CLEAR
CO2 SERPL-SCNC: 28 MMOL/L (ref 23–29)
COLOR UR AUTO: YELLOW
CREAT SERPL-MCNC: 0.9 MG/DL (ref 0.5–1.4)
CTP QC/QA: YES
DIFFERENTIAL METHOD: ABNORMAL
EOSINOPHIL # BLD AUTO: 0.2 K/UL (ref 0–0.5)
EOSINOPHIL NFR BLD: 2.8 % (ref 0–8)
ERYTHROCYTE [DISTWIDTH] IN BLOOD BY AUTOMATED COUNT: 12.6 % (ref 11.5–14.5)
EST. GFR  (AFRICAN AMERICAN): >60 ML/MIN/1.73 M^2
EST. GFR  (NON AFRICAN AMERICAN): >60 ML/MIN/1.73 M^2
ESTIMATED AVG GLUCOSE: 140 MG/DL (ref 68–131)
GLUCOSE SERPL-MCNC: 145 MG/DL (ref 70–110)
GLUCOSE UR QL STRIP: NEGATIVE
HBA1C MFR BLD: 6.5 % (ref 4–5.6)
HCT VFR BLD AUTO: 37.8 % (ref 37–48.5)
HGB BLD-MCNC: 12.5 G/DL (ref 12–16)
HGB UR QL STRIP: NEGATIVE
HYALINE CASTS UR QL AUTO: 7 /LPF
IMM GRANULOCYTES # BLD AUTO: 0.03 K/UL (ref 0–0.04)
IMM GRANULOCYTES NFR BLD AUTO: 0.4 % (ref 0–0.5)
KETONES UR QL STRIP: NEGATIVE
LACTATE SERPL-SCNC: 1.6 MMOL/L (ref 0.5–2.2)
LEUKOCYTE ESTERASE UR QL STRIP: ABNORMAL
LIPASE SERPL-CCNC: 43 U/L (ref 4–60)
LYMPHOCYTES # BLD AUTO: 1.2 K/UL (ref 1–4.8)
LYMPHOCYTES NFR BLD: 17.2 % (ref 18–48)
MCH RBC QN AUTO: 33.6 PG (ref 27–31)
MCHC RBC AUTO-ENTMCNC: 33.1 G/DL (ref 32–36)
MCV RBC AUTO: 102 FL (ref 82–98)
MICROSCOPIC COMMENT: ABNORMAL
MONOCYTES # BLD AUTO: 0.6 K/UL (ref 0.3–1)
MONOCYTES NFR BLD: 9.4 % (ref 4–15)
NEUTROPHILS # BLD AUTO: 4.6 K/UL (ref 1.8–7.7)
NEUTROPHILS NFR BLD: 69.6 % (ref 38–73)
NITRITE UR QL STRIP: NEGATIVE
NRBC BLD-RTO: 0 /100 WBC
PH UR STRIP: 6 [PH] (ref 5–8)
PLATELET # BLD AUTO: 89 K/UL (ref 150–350)
PMV BLD AUTO: 10.6 FL (ref 9.2–12.9)
POCT GLUCOSE: 149 MG/DL (ref 70–110)
POCT GLUCOSE: 97 MG/DL (ref 70–110)
POCT GLUCOSE: 99 MG/DL (ref 70–110)
POTASSIUM SERPL-SCNC: 4.1 MMOL/L (ref 3.5–5.1)
PROT SERPL-MCNC: 7.1 G/DL (ref 6–8.4)
PROT UR QL STRIP: NEGATIVE
RBC # BLD AUTO: 3.72 M/UL (ref 4–5.4)
RBC #/AREA URNS AUTO: 1 /HPF (ref 0–4)
SARS-COV-2 RDRP RESP QL NAA+PROBE: NEGATIVE
SODIUM SERPL-SCNC: 138 MMOL/L (ref 136–145)
SP GR UR STRIP: 1.01 (ref 1–1.03)
SQUAMOUS #/AREA URNS AUTO: 2 /HPF
URN SPEC COLLECT METH UR: ABNORMAL
WBC # BLD AUTO: 6.68 K/UL (ref 3.9–12.7)
WBC #/AREA URNS AUTO: 5 /HPF (ref 0–5)

## 2021-02-26 PROCEDURE — 20600001 HC STEP DOWN PRIVATE ROOM

## 2021-02-26 PROCEDURE — 63600175 PHARM REV CODE 636 W HCPCS: Performed by: EMERGENCY MEDICINE

## 2021-02-26 PROCEDURE — 25000003 PHARM REV CODE 250: Performed by: STUDENT IN AN ORGANIZED HEALTH CARE EDUCATION/TRAINING PROGRAM

## 2021-02-26 PROCEDURE — 63600175 PHARM REV CODE 636 W HCPCS: Performed by: STUDENT IN AN ORGANIZED HEALTH CARE EDUCATION/TRAINING PROGRAM

## 2021-02-26 PROCEDURE — 85025 COMPLETE CBC W/AUTO DIFF WBC: CPT

## 2021-02-26 PROCEDURE — 99285 PR EMERGENCY DEPT VISIT,LEVEL V: ICD-10-PCS | Mod: CS,,, | Performed by: EMERGENCY MEDICINE

## 2021-02-26 PROCEDURE — 96374 THER/PROPH/DIAG INJ IV PUSH: CPT

## 2021-02-26 PROCEDURE — 96375 TX/PRO/DX INJ NEW DRUG ADDON: CPT

## 2021-02-26 PROCEDURE — 99223 PR INITIAL HOSPITAL CARE,LEVL III: ICD-10-PCS | Mod: AI,,, | Performed by: SURGERY

## 2021-02-26 PROCEDURE — 93010 EKG 12-LEAD: ICD-10-PCS | Mod: ,,, | Performed by: INTERNAL MEDICINE

## 2021-02-26 PROCEDURE — 96361 HYDRATE IV INFUSION ADD-ON: CPT

## 2021-02-26 PROCEDURE — 25500020 PHARM REV CODE 255: Performed by: EMERGENCY MEDICINE

## 2021-02-26 PROCEDURE — 83605 ASSAY OF LACTIC ACID: CPT

## 2021-02-26 PROCEDURE — 99223 1ST HOSP IP/OBS HIGH 75: CPT | Mod: AI,,, | Performed by: SURGERY

## 2021-02-26 PROCEDURE — 96376 TX/PRO/DX INJ SAME DRUG ADON: CPT

## 2021-02-26 PROCEDURE — 99285 EMERGENCY DEPT VISIT HI MDM: CPT | Mod: 25

## 2021-02-26 PROCEDURE — 83036 HEMOGLOBIN GLYCOSYLATED A1C: CPT

## 2021-02-26 PROCEDURE — 93005 ELECTROCARDIOGRAM TRACING: CPT

## 2021-02-26 PROCEDURE — 93010 ELECTROCARDIOGRAM REPORT: CPT | Mod: ,,, | Performed by: INTERNAL MEDICINE

## 2021-02-26 PROCEDURE — 63600175 PHARM REV CODE 636 W HCPCS: Performed by: GENERAL PRACTICE

## 2021-02-26 PROCEDURE — U0002 COVID-19 LAB TEST NON-CDC: HCPCS | Performed by: GENERAL PRACTICE

## 2021-02-26 PROCEDURE — 99285 EMERGENCY DEPT VISIT HI MDM: CPT | Mod: CS,,, | Performed by: EMERGENCY MEDICINE

## 2021-02-26 PROCEDURE — 80053 COMPREHEN METABOLIC PANEL: CPT

## 2021-02-26 PROCEDURE — 81001 URINALYSIS AUTO W/SCOPE: CPT

## 2021-02-26 PROCEDURE — 83690 ASSAY OF LIPASE: CPT

## 2021-02-26 RX ORDER — LIDOCAINE HYDROCHLORIDE 10 MG/ML
1 INJECTION, SOLUTION EPIDURAL; INFILTRATION; INTRACAUDAL; PERINEURAL ONCE
Status: DISCONTINUED | OUTPATIENT
Start: 2021-02-26 | End: 2021-02-28 | Stop reason: HOSPADM

## 2021-02-26 RX ORDER — ACETAMINOPHEN 325 MG/1
650 TABLET ORAL EVERY 8 HOURS PRN
Status: DISCONTINUED | OUTPATIENT
Start: 2021-02-26 | End: 2021-02-28 | Stop reason: HOSPADM

## 2021-02-26 RX ORDER — MORPHINE SULFATE 4 MG/ML
4 INJECTION, SOLUTION INTRAMUSCULAR; INTRAVENOUS
Status: COMPLETED | OUTPATIENT
Start: 2021-02-26 | End: 2021-02-26

## 2021-02-26 RX ORDER — SODIUM CHLORIDE 9 MG/ML
INJECTION, SOLUTION INTRAVENOUS CONTINUOUS
Status: DISCONTINUED | OUTPATIENT
Start: 2021-02-26 | End: 2021-02-28

## 2021-02-26 RX ORDER — ONDANSETRON 2 MG/ML
8 INJECTION INTRAMUSCULAR; INTRAVENOUS EVERY 8 HOURS PRN
Status: DISCONTINUED | OUTPATIENT
Start: 2021-02-26 | End: 2021-02-28 | Stop reason: HOSPADM

## 2021-02-26 RX ORDER — SODIUM CHLORIDE 0.9 % (FLUSH) 0.9 %
10 SYRINGE (ML) INJECTION
Status: DISCONTINUED | OUTPATIENT
Start: 2021-02-26 | End: 2021-02-28 | Stop reason: HOSPADM

## 2021-02-26 RX ORDER — INSULIN ASPART 100 [IU]/ML
1-10 INJECTION, SOLUTION INTRAVENOUS; SUBCUTANEOUS EVERY 6 HOURS PRN
Status: DISCONTINUED | OUTPATIENT
Start: 2021-02-26 | End: 2021-02-28 | Stop reason: HOSPADM

## 2021-02-26 RX ORDER — MORPHINE SULFATE 2 MG/ML
2 INJECTION, SOLUTION INTRAMUSCULAR; INTRAVENOUS EVERY 4 HOURS PRN
Status: DISCONTINUED | OUTPATIENT
Start: 2021-02-26 | End: 2021-02-28 | Stop reason: HOSPADM

## 2021-02-26 RX ORDER — GLUCAGON 1 MG
1 KIT INJECTION
Status: DISCONTINUED | OUTPATIENT
Start: 2021-02-26 | End: 2021-02-28 | Stop reason: HOSPADM

## 2021-02-26 RX ADMIN — IOHEXOL 75 ML: 350 INJECTION, SOLUTION INTRAVENOUS at 05:02

## 2021-02-26 RX ADMIN — ONDANSETRON 8 MG: 2 INJECTION INTRAMUSCULAR; INTRAVENOUS at 12:02

## 2021-02-26 RX ADMIN — SODIUM CHLORIDE, SODIUM LACTATE, POTASSIUM CHLORIDE, AND CALCIUM CHLORIDE 1000 ML: .6; .31; .03; .02 INJECTION, SOLUTION INTRAVENOUS at 02:02

## 2021-02-26 RX ADMIN — MORPHINE SULFATE 4 MG: 4 INJECTION INTRAVENOUS at 04:02

## 2021-02-26 RX ADMIN — SODIUM CHLORIDE: 0.9 INJECTION, SOLUTION INTRAVENOUS at 09:02

## 2021-02-26 RX ADMIN — MORPHINE SULFATE 4 MG: 4 INJECTION INTRAVENOUS at 07:02

## 2021-02-26 RX ADMIN — MORPHINE SULFATE 4 MG: 4 INJECTION INTRAVENOUS at 02:02

## 2021-02-26 RX ADMIN — MORPHINE SULFATE 2 MG: 2 INJECTION, SOLUTION INTRAMUSCULAR; INTRAVENOUS at 09:02

## 2021-02-27 LAB
ANION GAP SERPL CALC-SCNC: 8 MMOL/L (ref 8–16)
BASOPHILS # BLD AUTO: 0.03 K/UL (ref 0–0.2)
BASOPHILS NFR BLD: 0.5 % (ref 0–1.9)
BUN SERPL-MCNC: 9 MG/DL (ref 8–23)
CALCIUM SERPL-MCNC: 8.5 MG/DL (ref 8.7–10.5)
CHLORIDE SERPL-SCNC: 103 MMOL/L (ref 95–110)
CO2 SERPL-SCNC: 24 MMOL/L (ref 23–29)
CREAT SERPL-MCNC: 0.8 MG/DL (ref 0.5–1.4)
DIFFERENTIAL METHOD: ABNORMAL
EOSINOPHIL # BLD AUTO: 0.3 K/UL (ref 0–0.5)
EOSINOPHIL NFR BLD: 4.4 % (ref 0–8)
ERYTHROCYTE [DISTWIDTH] IN BLOOD BY AUTOMATED COUNT: 12.5 % (ref 11.5–14.5)
EST. GFR  (AFRICAN AMERICAN): >60 ML/MIN/1.73 M^2
EST. GFR  (NON AFRICAN AMERICAN): >60 ML/MIN/1.73 M^2
GLUCOSE SERPL-MCNC: 129 MG/DL (ref 70–110)
HCT VFR BLD AUTO: 36.8 % (ref 37–48.5)
HGB BLD-MCNC: 11.7 G/DL (ref 12–16)
IMM GRANULOCYTES # BLD AUTO: 0.02 K/UL (ref 0–0.04)
IMM GRANULOCYTES NFR BLD AUTO: 0.3 % (ref 0–0.5)
LYMPHOCYTES # BLD AUTO: 1.3 K/UL (ref 1–4.8)
LYMPHOCYTES NFR BLD: 20.5 % (ref 18–48)
MAGNESIUM SERPL-MCNC: 1.3 MG/DL (ref 1.6–2.6)
MCH RBC QN AUTO: 33.1 PG (ref 27–31)
MCHC RBC AUTO-ENTMCNC: 31.8 G/DL (ref 32–36)
MCV RBC AUTO: 104 FL (ref 82–98)
MONOCYTES # BLD AUTO: 0.7 K/UL (ref 0.3–1)
MONOCYTES NFR BLD: 11 % (ref 4–15)
NEUTROPHILS # BLD AUTO: 3.9 K/UL (ref 1.8–7.7)
NEUTROPHILS NFR BLD: 63.3 % (ref 38–73)
NRBC BLD-RTO: 0 /100 WBC
PLATELET # BLD AUTO: 71 K/UL (ref 150–350)
PMV BLD AUTO: 10.8 FL (ref 9.2–12.9)
POCT GLUCOSE: 102 MG/DL (ref 70–110)
POCT GLUCOSE: 123 MG/DL (ref 70–110)
POCT GLUCOSE: 86 MG/DL (ref 70–110)
POTASSIUM SERPL-SCNC: 3.7 MMOL/L (ref 3.5–5.1)
RBC # BLD AUTO: 3.53 M/UL (ref 4–5.4)
SODIUM SERPL-SCNC: 135 MMOL/L (ref 136–145)
WBC # BLD AUTO: 6.2 K/UL (ref 3.9–12.7)

## 2021-02-27 PROCEDURE — 36415 COLL VENOUS BLD VENIPUNCTURE: CPT

## 2021-02-27 PROCEDURE — 80048 BASIC METABOLIC PNL TOTAL CA: CPT

## 2021-02-27 PROCEDURE — 99232 PR SUBSEQUENT HOSPITAL CARE,LEVL II: ICD-10-PCS | Mod: ,,, | Performed by: SURGERY

## 2021-02-27 PROCEDURE — 20600001 HC STEP DOWN PRIVATE ROOM

## 2021-02-27 PROCEDURE — 99232 SBSQ HOSP IP/OBS MODERATE 35: CPT | Mod: ,,, | Performed by: SURGERY

## 2021-02-27 PROCEDURE — 25000003 PHARM REV CODE 250: Performed by: STUDENT IN AN ORGANIZED HEALTH CARE EDUCATION/TRAINING PROGRAM

## 2021-02-27 PROCEDURE — 63600175 PHARM REV CODE 636 W HCPCS: Performed by: STUDENT IN AN ORGANIZED HEALTH CARE EDUCATION/TRAINING PROGRAM

## 2021-02-27 PROCEDURE — 83735 ASSAY OF MAGNESIUM: CPT

## 2021-02-27 PROCEDURE — 85025 COMPLETE CBC W/AUTO DIFF WBC: CPT

## 2021-02-27 PROCEDURE — 25500020 PHARM REV CODE 255: Performed by: STUDENT IN AN ORGANIZED HEALTH CARE EDUCATION/TRAINING PROGRAM

## 2021-02-27 RX ORDER — MAGNESIUM SULFATE HEPTAHYDRATE 40 MG/ML
2 INJECTION, SOLUTION INTRAVENOUS
Status: COMPLETED | OUTPATIENT
Start: 2021-02-27 | End: 2021-02-27

## 2021-02-27 RX ADMIN — MAGNESIUM SULFATE 2 G: 2 INJECTION INTRAVENOUS at 02:02

## 2021-02-27 RX ADMIN — MORPHINE SULFATE 2 MG: 2 INJECTION, SOLUTION INTRAMUSCULAR; INTRAVENOUS at 01:02

## 2021-02-27 RX ADMIN — MORPHINE SULFATE 2 MG: 2 INJECTION, SOLUTION INTRAMUSCULAR; INTRAVENOUS at 08:02

## 2021-02-27 RX ADMIN — MORPHINE SULFATE 2 MG: 2 INJECTION, SOLUTION INTRAMUSCULAR; INTRAVENOUS at 09:02

## 2021-02-27 RX ADMIN — ACETAMINOPHEN 650 MG: 325 TABLET ORAL at 10:02

## 2021-02-27 RX ADMIN — SODIUM CHLORIDE: 0.9 INJECTION, SOLUTION INTRAVENOUS at 02:02

## 2021-02-27 RX ADMIN — MAGNESIUM SULFATE 2 G: 2 INJECTION INTRAVENOUS at 04:02

## 2021-02-27 RX ADMIN — DIATRIZOATE MEGLUMINE AND DIATRIZOATE SODIUM 100 ML: 660; 100 LIQUID ORAL; RECTAL at 08:02

## 2021-02-27 RX ADMIN — ONDANSETRON 8 MG: 2 INJECTION INTRAMUSCULAR; INTRAVENOUS at 01:02

## 2021-02-27 RX ADMIN — PROMETHAZINE HYDROCHLORIDE 12.5 MG: 25 INJECTION INTRAMUSCULAR; INTRAVENOUS at 05:02

## 2021-02-27 RX ADMIN — MAGNESIUM SULFATE 2 G: 2 INJECTION INTRAVENOUS at 12:02

## 2021-02-28 VITALS
BODY MASS INDEX: 25.55 KG/M2 | RESPIRATION RATE: 16 BRPM | DIASTOLIC BLOOD PRESSURE: 57 MMHG | WEIGHT: 138.88 LBS | SYSTOLIC BLOOD PRESSURE: 106 MMHG | OXYGEN SATURATION: 98 % | TEMPERATURE: 97 F | HEART RATE: 60 BPM | HEIGHT: 62 IN

## 2021-02-28 LAB
ANION GAP SERPL CALC-SCNC: 9 MMOL/L (ref 8–16)
BASOPHILS # BLD AUTO: 0.02 K/UL (ref 0–0.2)
BASOPHILS NFR BLD: 0.3 % (ref 0–1.9)
BUN SERPL-MCNC: 7 MG/DL (ref 8–23)
CALCIUM SERPL-MCNC: 8 MG/DL (ref 8.7–10.5)
CHLORIDE SERPL-SCNC: 106 MMOL/L (ref 95–110)
CO2 SERPL-SCNC: 26 MMOL/L (ref 23–29)
CREAT SERPL-MCNC: 0.7 MG/DL (ref 0.5–1.4)
DIFFERENTIAL METHOD: ABNORMAL
EOSINOPHIL # BLD AUTO: 0.3 K/UL (ref 0–0.5)
EOSINOPHIL NFR BLD: 4.6 % (ref 0–8)
ERYTHROCYTE [DISTWIDTH] IN BLOOD BY AUTOMATED COUNT: 12.6 % (ref 11.5–14.5)
EST. GFR  (AFRICAN AMERICAN): >60 ML/MIN/1.73 M^2
EST. GFR  (NON AFRICAN AMERICAN): >60 ML/MIN/1.73 M^2
GLUCOSE SERPL-MCNC: 107 MG/DL (ref 70–110)
HCT VFR BLD AUTO: 36.4 % (ref 37–48.5)
HGB BLD-MCNC: 11.6 G/DL (ref 12–16)
IMM GRANULOCYTES # BLD AUTO: 0.01 K/UL (ref 0–0.04)
IMM GRANULOCYTES NFR BLD AUTO: 0.2 % (ref 0–0.5)
LYMPHOCYTES # BLD AUTO: 1.8 K/UL (ref 1–4.8)
LYMPHOCYTES NFR BLD: 30.7 % (ref 18–48)
MAGNESIUM SERPL-MCNC: 2.2 MG/DL (ref 1.6–2.6)
MCH RBC QN AUTO: 33.2 PG (ref 27–31)
MCHC RBC AUTO-ENTMCNC: 31.9 G/DL (ref 32–36)
MCV RBC AUTO: 104 FL (ref 82–98)
MONOCYTES # BLD AUTO: 0.6 K/UL (ref 0.3–1)
MONOCYTES NFR BLD: 10.4 % (ref 4–15)
NEUTROPHILS # BLD AUTO: 3.2 K/UL (ref 1.8–7.7)
NEUTROPHILS NFR BLD: 53.8 % (ref 38–73)
NRBC BLD-RTO: 0 /100 WBC
PLATELET # BLD AUTO: 68 K/UL (ref 150–350)
PMV BLD AUTO: 10.7 FL (ref 9.2–12.9)
POCT GLUCOSE: 122 MG/DL (ref 70–110)
POTASSIUM SERPL-SCNC: 3.3 MMOL/L (ref 3.5–5.1)
RBC # BLD AUTO: 3.49 M/UL (ref 4–5.4)
SODIUM SERPL-SCNC: 141 MMOL/L (ref 136–145)
WBC # BLD AUTO: 5.86 K/UL (ref 3.9–12.7)

## 2021-02-28 PROCEDURE — 25000003 PHARM REV CODE 250: Performed by: STUDENT IN AN ORGANIZED HEALTH CARE EDUCATION/TRAINING PROGRAM

## 2021-02-28 PROCEDURE — 80048 BASIC METABOLIC PNL TOTAL CA: CPT

## 2021-02-28 PROCEDURE — 83735 ASSAY OF MAGNESIUM: CPT

## 2021-02-28 PROCEDURE — 85025 COMPLETE CBC W/AUTO DIFF WBC: CPT

## 2021-02-28 PROCEDURE — 36415 COLL VENOUS BLD VENIPUNCTURE: CPT

## 2021-02-28 PROCEDURE — 63600175 PHARM REV CODE 636 W HCPCS: Performed by: STUDENT IN AN ORGANIZED HEALTH CARE EDUCATION/TRAINING PROGRAM

## 2021-02-28 RX ORDER — POTASSIUM CHLORIDE 20 MEQ/1
20 TABLET, EXTENDED RELEASE ORAL ONCE
Status: COMPLETED | OUTPATIENT
Start: 2021-02-28 | End: 2021-02-28

## 2021-02-28 RX ADMIN — SODIUM CHLORIDE: 0.9 INJECTION, SOLUTION INTRAVENOUS at 01:02

## 2021-02-28 RX ADMIN — MORPHINE SULFATE 2 MG: 2 INJECTION, SOLUTION INTRAMUSCULAR; INTRAVENOUS at 01:02

## 2021-02-28 RX ADMIN — POTASSIUM CHLORIDE 20 MEQ: 1500 TABLET, EXTENDED RELEASE ORAL at 09:02

## 2021-03-02 ENCOUNTER — TELEPHONE (OUTPATIENT)
Dept: HEMATOLOGY/ONCOLOGY | Facility: HOSPITAL | Age: 76
End: 2021-03-02

## 2021-03-02 DIAGNOSIS — E11.9 TYPE 2 DIABETES MELLITUS WITHOUT COMPLICATION, WITHOUT LONG-TERM CURRENT USE OF INSULIN: ICD-10-CM

## 2021-03-02 DIAGNOSIS — D46.Z MDS (MYELODYSPLASTIC SYNDROME), LOW GRADE: Primary | ICD-10-CM

## 2021-03-03 ENCOUNTER — OFFICE VISIT (OUTPATIENT)
Dept: UROGYNECOLOGY | Facility: CLINIC | Age: 76
End: 2021-03-03
Payer: MEDICARE

## 2021-03-03 VITALS
SYSTOLIC BLOOD PRESSURE: 113 MMHG | HEART RATE: 71 BPM | WEIGHT: 132 LBS | DIASTOLIC BLOOD PRESSURE: 58 MMHG | HEIGHT: 62 IN | BODY MASS INDEX: 24.29 KG/M2

## 2021-03-03 DIAGNOSIS — Z46.89 PESSARY MAINTENANCE: ICD-10-CM

## 2021-03-03 DIAGNOSIS — N39.0 RECURRENT UTI: ICD-10-CM

## 2021-03-03 DIAGNOSIS — B37.2 YEAST DERMATITIS: ICD-10-CM

## 2021-03-03 DIAGNOSIS — N39.46 MIXED STRESS AND URGE URINARY INCONTINENCE: ICD-10-CM

## 2021-03-03 DIAGNOSIS — N95.2 VAGINAL ATROPHY: ICD-10-CM

## 2021-03-03 DIAGNOSIS — N81.6 RECTOCELE: ICD-10-CM

## 2021-03-03 DIAGNOSIS — N81.11 MIDLINE CYSTOCELE: Primary | ICD-10-CM

## 2021-03-03 PROCEDURE — 99213 PR OFFICE/OUTPT VISIT, EST, LEVL III, 20-29 MIN: ICD-10-PCS | Mod: S$PBB,,, | Performed by: NURSE PRACTITIONER

## 2021-03-03 PROCEDURE — 99999 PR PBB SHADOW E&M-EST. PATIENT-LVL V: CPT | Mod: PBBFAC,,, | Performed by: NURSE PRACTITIONER

## 2021-03-03 PROCEDURE — 99215 OFFICE O/P EST HI 40 MIN: CPT | Mod: PBBFAC | Performed by: NURSE PRACTITIONER

## 2021-03-03 PROCEDURE — 99213 OFFICE O/P EST LOW 20 MIN: CPT | Mod: S$PBB,,, | Performed by: NURSE PRACTITIONER

## 2021-03-03 PROCEDURE — 99999 PR PBB SHADOW E&M-EST. PATIENT-LVL V: ICD-10-PCS | Mod: PBBFAC,,, | Performed by: NURSE PRACTITIONER

## 2021-03-03 RX ORDER — NYSTATIN AND TRIAMCINOLONE ACETONIDE 100000; 1 [USP'U]/G; MG/G
OINTMENT TOPICAL 2 TIMES DAILY
Qty: 30 G | Refills: 3 | Status: SHIPPED | OUTPATIENT
Start: 2021-03-03 | End: 2021-03-03

## 2021-03-03 RX ORDER — CLOTRIMAZOLE AND BETAMETHASONE DIPROPIONATE 10; .64 MG/G; MG/G
CREAM TOPICAL 2 TIMES DAILY
Qty: 45 G | Refills: 3 | Status: SHIPPED | OUTPATIENT
Start: 2021-03-03 | End: 2021-03-13

## 2021-03-03 RX ORDER — METFORMIN HYDROCHLORIDE 500 MG/1
500 TABLET ORAL 2 TIMES DAILY WITH MEALS
COMMUNITY
End: 2021-06-03 | Stop reason: SDUPTHER

## 2021-03-03 RX ORDER — NITROFURANTOIN 25; 75 MG/1; MG/1
100 CAPSULE ORAL 2 TIMES DAILY
COMMUNITY
Start: 2021-02-21 | End: 2021-06-03

## 2021-03-09 ENCOUNTER — OFFICE VISIT (OUTPATIENT)
Dept: HEMATOLOGY/ONCOLOGY | Facility: CLINIC | Age: 76
End: 2021-03-09
Payer: MEDICARE

## 2021-03-09 ENCOUNTER — LAB VISIT (OUTPATIENT)
Dept: LAB | Facility: HOSPITAL | Age: 76
End: 2021-03-09
Payer: MEDICARE

## 2021-03-09 VITALS
OXYGEN SATURATION: 100 % | TEMPERATURE: 98 F | DIASTOLIC BLOOD PRESSURE: 54 MMHG | HEART RATE: 67 BPM | BODY MASS INDEX: 23.79 KG/M2 | HEIGHT: 62 IN | WEIGHT: 129.31 LBS | RESPIRATION RATE: 16 BRPM | SYSTOLIC BLOOD PRESSURE: 114 MMHG

## 2021-03-09 DIAGNOSIS — D50.0 IRON DEFICIENCY ANEMIA DUE TO CHRONIC BLOOD LOSS: ICD-10-CM

## 2021-03-09 DIAGNOSIS — D46.Z MDS (MYELODYSPLASTIC SYNDROME), LOW GRADE: Primary | ICD-10-CM

## 2021-03-09 DIAGNOSIS — D46.9 MYELODYSPLASTIC SYNDROME: ICD-10-CM

## 2021-03-09 DIAGNOSIS — D46.Z MDS (MYELODYSPLASTIC SYNDROME), LOW GRADE: ICD-10-CM

## 2021-03-09 DIAGNOSIS — E11.9 TYPE 2 DIABETES MELLITUS WITHOUT COMPLICATION, WITHOUT LONG-TERM CURRENT USE OF INSULIN: ICD-10-CM

## 2021-03-09 DIAGNOSIS — Z85.038 HISTORY OF COLON CANCER: ICD-10-CM

## 2021-03-09 DIAGNOSIS — I82.629 ACUTE DEEP VEIN THROMBOSIS (DVT) OF OTHER VEIN OF UPPER EXTREMITY, UNSPECIFIED LATERALITY: ICD-10-CM

## 2021-03-09 DIAGNOSIS — R53.83 FATIGUE, UNSPECIFIED TYPE: ICD-10-CM

## 2021-03-09 LAB
BASOPHILS # BLD AUTO: 0.05 K/UL (ref 0–0.2)
BASOPHILS NFR BLD: 0.9 % (ref 0–1.9)
DIFFERENTIAL METHOD: ABNORMAL
EOSINOPHIL # BLD AUTO: 0.3 K/UL (ref 0–0.5)
EOSINOPHIL NFR BLD: 6.1 % (ref 0–8)
ERYTHROCYTE [DISTWIDTH] IN BLOOD BY AUTOMATED COUNT: 12.8 % (ref 11.5–14.5)
ESTIMATED AVG GLUCOSE: 131 MG/DL (ref 68–131)
HBA1C MFR BLD: 6.2 % (ref 4–5.6)
HCT VFR BLD AUTO: 37.7 % (ref 37–48.5)
HGB BLD-MCNC: 12.2 G/DL (ref 12–16)
IMM GRANULOCYTES # BLD AUTO: 0.01 K/UL (ref 0–0.04)
IMM GRANULOCYTES NFR BLD AUTO: 0.2 % (ref 0–0.5)
LYMPHOCYTES # BLD AUTO: 1.5 K/UL (ref 1–4.8)
LYMPHOCYTES NFR BLD: 27.1 % (ref 18–48)
MCH RBC QN AUTO: 32.9 PG (ref 27–31)
MCHC RBC AUTO-ENTMCNC: 32.4 G/DL (ref 32–36)
MCV RBC AUTO: 102 FL (ref 82–98)
MONOCYTES # BLD AUTO: 0.5 K/UL (ref 0.3–1)
MONOCYTES NFR BLD: 9.3 % (ref 4–15)
NEUTROPHILS # BLD AUTO: 3 K/UL (ref 1.8–7.7)
NEUTROPHILS NFR BLD: 56.4 % (ref 38–73)
NRBC BLD-RTO: 0 /100 WBC
PLATELET # BLD AUTO: 106 K/UL (ref 150–350)
PMV BLD AUTO: 11.3 FL (ref 9.2–12.9)
RBC # BLD AUTO: 3.71 M/UL (ref 4–5.4)
WBC # BLD AUTO: 5.39 K/UL (ref 3.9–12.7)

## 2021-03-09 PROCEDURE — 99215 OFFICE O/P EST HI 40 MIN: CPT | Mod: S$PBB,GC,, | Performed by: STUDENT IN AN ORGANIZED HEALTH CARE EDUCATION/TRAINING PROGRAM

## 2021-03-09 PROCEDURE — 99215 PR OFFICE/OUTPT VISIT, EST, LEVL V, 40-54 MIN: ICD-10-PCS | Mod: S$PBB,GC,, | Performed by: STUDENT IN AN ORGANIZED HEALTH CARE EDUCATION/TRAINING PROGRAM

## 2021-03-09 PROCEDURE — 85025 COMPLETE CBC W/AUTO DIFF WBC: CPT | Performed by: STUDENT IN AN ORGANIZED HEALTH CARE EDUCATION/TRAINING PROGRAM

## 2021-03-09 PROCEDURE — 83036 HEMOGLOBIN GLYCOSYLATED A1C: CPT | Performed by: STUDENT IN AN ORGANIZED HEALTH CARE EDUCATION/TRAINING PROGRAM

## 2021-03-09 PROCEDURE — 36415 COLL VENOUS BLD VENIPUNCTURE: CPT | Performed by: STUDENT IN AN ORGANIZED HEALTH CARE EDUCATION/TRAINING PROGRAM

## 2021-03-09 PROCEDURE — 99999 PR PBB SHADOW E&M-EST. PATIENT-LVL V: ICD-10-PCS | Mod: PBBFAC,GC,, | Performed by: STUDENT IN AN ORGANIZED HEALTH CARE EDUCATION/TRAINING PROGRAM

## 2021-03-09 PROCEDURE — 99999 PR PBB SHADOW E&M-EST. PATIENT-LVL V: CPT | Mod: PBBFAC,GC,, | Performed by: STUDENT IN AN ORGANIZED HEALTH CARE EDUCATION/TRAINING PROGRAM

## 2021-03-09 PROCEDURE — 99215 OFFICE O/P EST HI 40 MIN: CPT | Mod: PBBFAC | Performed by: STUDENT IN AN ORGANIZED HEALTH CARE EDUCATION/TRAINING PROGRAM

## 2021-03-09 RX ORDER — PROMETHAZINE HYDROCHLORIDE AND CODEINE PHOSPHATE 6.25; 1 MG/5ML; MG/5ML
SOLUTION ORAL
COMMUNITY
Start: 2021-01-14 | End: 2021-03-31 | Stop reason: SDUPTHER

## 2021-03-09 RX ORDER — CHOLECALCIFEROL (VITAMIN D3) 125 MCG
1 CAPSULE ORAL DAILY
COMMUNITY
Start: 2021-01-01

## 2021-03-09 RX ORDER — MONTELUKAST SODIUM 10 MG/1
10 TABLET ORAL DAILY
COMMUNITY
Start: 2021-02-22 | End: 2021-06-03 | Stop reason: SDUPTHER

## 2021-03-09 RX ORDER — IBUPROFEN 100 MG/5ML
1000 SUSPENSION, ORAL (FINAL DOSE FORM) ORAL DAILY
COMMUNITY
Start: 2021-01-18

## 2021-03-09 RX ORDER — NAPROXEN SODIUM 220 MG/1
1 TABLET ORAL DAILY
Status: ON HOLD | COMMUNITY
Start: 2021-01-01 | End: 2022-02-12 | Stop reason: HOSPADM

## 2021-03-09 RX ORDER — BUTALBITAL, ACETAMINOPHEN AND CAFFEINE 50; 325; 40 MG/1; MG/1; MG/1
1 TABLET ORAL EVERY 8 HOURS PRN
COMMUNITY
Start: 2021-02-22 | End: 2022-03-02

## 2021-03-16 PROBLEM — D46.9 MYELODYSPLASTIC SYNDROME: Status: ACTIVE | Noted: 2021-03-16

## 2021-03-18 ENCOUNTER — PATIENT MESSAGE (OUTPATIENT)
Dept: RESEARCH | Facility: HOSPITAL | Age: 76
End: 2021-03-18

## 2021-03-19 ENCOUNTER — LAB VISIT (OUTPATIENT)
Dept: LAB | Facility: HOSPITAL | Age: 76
End: 2021-03-19
Attending: INTERNAL MEDICINE
Payer: MEDICARE

## 2021-03-19 DIAGNOSIS — Z79.899 MEDICATION MANAGEMENT: ICD-10-CM

## 2021-03-19 DIAGNOSIS — E03.4 HYPOTHYROIDISM DUE TO ACQUIRED ATROPHY OF THYROID: ICD-10-CM

## 2021-03-19 DIAGNOSIS — E11.9 TYPE 2 DIABETES MELLITUS WITHOUT COMPLICATION, WITHOUT LONG-TERM CURRENT USE OF INSULIN: ICD-10-CM

## 2021-03-19 LAB
ALBUMIN SERPL BCP-MCNC: 4.2 G/DL (ref 3.5–5.2)
ALP SERPL-CCNC: 59 U/L (ref 55–135)
ALT SERPL W/O P-5'-P-CCNC: 27 U/L (ref 10–44)
ANION GAP SERPL CALC-SCNC: 15 MMOL/L (ref 8–16)
AST SERPL-CCNC: 28 U/L (ref 10–40)
BILIRUB SERPL-MCNC: 1 MG/DL (ref 0.1–1)
BUN SERPL-MCNC: 14 MG/DL (ref 8–23)
CALCIUM SERPL-MCNC: 9.8 MG/DL (ref 8.7–10.5)
CHLORIDE SERPL-SCNC: 102 MMOL/L (ref 95–110)
CO2 SERPL-SCNC: 22 MMOL/L (ref 23–29)
CREAT SERPL-MCNC: 0.8 MG/DL (ref 0.5–1.4)
EST. GFR  (AFRICAN AMERICAN): >60 ML/MIN/1.73 M^2
EST. GFR  (NON AFRICAN AMERICAN): >60 ML/MIN/1.73 M^2
GLUCOSE SERPL-MCNC: 89 MG/DL (ref 70–110)
POTASSIUM SERPL-SCNC: 4.1 MMOL/L (ref 3.5–5.1)
PROT SERPL-MCNC: 7.3 G/DL (ref 6–8.4)
SODIUM SERPL-SCNC: 139 MMOL/L (ref 136–145)
T4 FREE SERPL-MCNC: 1.41 NG/DL (ref 0.71–1.51)
TSH SERPL DL<=0.005 MIU/L-ACNC: 0.29 UIU/ML (ref 0.4–4)

## 2021-03-19 PROCEDURE — 84443 ASSAY THYROID STIM HORMONE: CPT | Performed by: INTERNAL MEDICINE

## 2021-03-19 PROCEDURE — 82607 VITAMIN B-12: CPT | Performed by: INTERNAL MEDICINE

## 2021-03-19 PROCEDURE — 84439 ASSAY OF FREE THYROXINE: CPT | Performed by: INTERNAL MEDICINE

## 2021-03-19 PROCEDURE — 80053 COMPREHEN METABOLIC PANEL: CPT | Performed by: INTERNAL MEDICINE

## 2021-03-19 PROCEDURE — 36415 COLL VENOUS BLD VENIPUNCTURE: CPT | Mod: PN | Performed by: INTERNAL MEDICINE

## 2021-03-20 LAB — VIT B12 SERPL-MCNC: 478 PG/ML (ref 210–950)

## 2021-03-22 ENCOUNTER — PATIENT MESSAGE (OUTPATIENT)
Dept: UROGYNECOLOGY | Facility: CLINIC | Age: 76
End: 2021-03-22

## 2021-03-25 ENCOUNTER — OFFICE VISIT (OUTPATIENT)
Dept: UROGYNECOLOGY | Facility: CLINIC | Age: 76
End: 2021-03-25
Payer: MEDICARE

## 2021-03-25 VITALS
SYSTOLIC BLOOD PRESSURE: 120 MMHG | DIASTOLIC BLOOD PRESSURE: 70 MMHG | BODY MASS INDEX: 23.4 KG/M2 | WEIGHT: 127.19 LBS | HEIGHT: 62 IN

## 2021-03-25 DIAGNOSIS — N81.6 RECTOCELE: ICD-10-CM

## 2021-03-25 DIAGNOSIS — N39.46 MIXED STRESS AND URGE URINARY INCONTINENCE: ICD-10-CM

## 2021-03-25 DIAGNOSIS — S30.814A ABRASION OF VAGINA, INITIAL ENCOUNTER: ICD-10-CM

## 2021-03-25 DIAGNOSIS — N81.11 MIDLINE CYSTOCELE: Primary | ICD-10-CM

## 2021-03-25 DIAGNOSIS — Z87.440 HX: UTI (URINARY TRACT INFECTION): ICD-10-CM

## 2021-03-25 LAB
BILIRUB SERPL-MCNC: NORMAL MG/DL
BLOOD URINE, POC: NORMAL
CLARITY, POC UA: CLEAR
COLOR, POC UA: YELLOW
GLUCOSE UR QL STRIP: NORMAL
KETONES UR QL STRIP: NORMAL
LEUKOCYTE ESTERASE URINE, POC: NORMAL
NITRITE, POC UA: NORMAL
PH, POC UA: 5
PROTEIN, POC: NORMAL
SPECIFIC GRAVITY, POC UA: 1.01
UROBILINOGEN, POC UA: NORMAL

## 2021-03-25 PROCEDURE — 99999 PR PBB SHADOW E&M-EST. PATIENT-LVL III: ICD-10-PCS | Mod: PBBFAC,,, | Performed by: NURSE PRACTITIONER

## 2021-03-25 PROCEDURE — 99213 OFFICE O/P EST LOW 20 MIN: CPT | Mod: PBBFAC | Performed by: NURSE PRACTITIONER

## 2021-03-25 PROCEDURE — 81002 URINALYSIS NONAUTO W/O SCOPE: CPT | Mod: PBBFAC | Performed by: NURSE PRACTITIONER

## 2021-03-25 PROCEDURE — 99999 PR PBB SHADOW E&M-EST. PATIENT-LVL III: CPT | Mod: PBBFAC,,, | Performed by: NURSE PRACTITIONER

## 2021-03-25 PROCEDURE — 99214 PR OFFICE/OUTPT VISIT, EST, LEVL IV, 30-39 MIN: ICD-10-PCS | Mod: S$PBB,,, | Performed by: NURSE PRACTITIONER

## 2021-03-25 PROCEDURE — 99214 OFFICE O/P EST MOD 30 MIN: CPT | Mod: S$PBB,,, | Performed by: NURSE PRACTITIONER

## 2021-03-25 RX ORDER — CYANOCOBALAMIN (VITAMIN B-12) 500 MCG
TABLET ORAL
COMMUNITY
End: 2021-10-08

## 2021-03-26 ENCOUNTER — PATIENT MESSAGE (OUTPATIENT)
Dept: UROGYNECOLOGY | Facility: CLINIC | Age: 76
End: 2021-03-26

## 2021-03-26 ENCOUNTER — PATIENT MESSAGE (OUTPATIENT)
Dept: RESEARCH | Facility: HOSPITAL | Age: 76
End: 2021-03-26

## 2021-03-26 DIAGNOSIS — N39.0 RECURRENT UTI: Primary | ICD-10-CM

## 2021-03-26 RX ORDER — CEPHALEXIN 250 MG/1
250 CAPSULE ORAL DAILY
Qty: 30 CAPSULE | Refills: 6 | Status: SHIPPED | OUTPATIENT
Start: 2021-03-26 | End: 2021-06-03

## 2021-04-08 ENCOUNTER — OFFICE VISIT (OUTPATIENT)
Dept: UROGYNECOLOGY | Facility: CLINIC | Age: 76
End: 2021-04-08
Payer: MEDICARE

## 2021-04-08 VITALS
BODY MASS INDEX: 23.93 KG/M2 | HEIGHT: 62 IN | WEIGHT: 130.06 LBS | SYSTOLIC BLOOD PRESSURE: 120 MMHG | DIASTOLIC BLOOD PRESSURE: 60 MMHG

## 2021-04-08 DIAGNOSIS — N39.46 MIXED STRESS AND URGE URINARY INCONTINENCE: ICD-10-CM

## 2021-04-08 DIAGNOSIS — N81.11 MIDLINE CYSTOCELE: ICD-10-CM

## 2021-04-08 DIAGNOSIS — N39.0 RECURRENT UTI: Primary | ICD-10-CM

## 2021-04-08 DIAGNOSIS — N95.2 VAGINAL ATROPHY: ICD-10-CM

## 2021-04-08 DIAGNOSIS — N81.6 RECTOCELE: ICD-10-CM

## 2021-04-08 DIAGNOSIS — S30.814D ABRASION OF VAGINA, SUBSEQUENT ENCOUNTER: ICD-10-CM

## 2021-04-08 LAB
BILIRUB SERPL-MCNC: NORMAL MG/DL
BLOOD URINE, POC: NORMAL
CLARITY, POC UA: CLEAR
COLOR, POC UA: YELLOW
GLUCOSE UR QL STRIP: NORMAL
KETONES UR QL STRIP: NORMAL
LEUKOCYTE ESTERASE URINE, POC: NORMAL
NITRITE, POC UA: NORMAL
PH, POC UA: 6
PROTEIN, POC: NORMAL
SPECIFIC GRAVITY, POC UA: 1.01
UROBILINOGEN, POC UA: NORMAL

## 2021-04-08 PROCEDURE — 57160 INSERT PESSARY/OTHER DEVICE: CPT | Mod: S$PBB,,, | Performed by: NURSE PRACTITIONER

## 2021-04-08 PROCEDURE — 57160 PR FIT/INSERT INTRAVAG SUPPORT DEVICE: ICD-10-PCS | Mod: S$PBB,,, | Performed by: NURSE PRACTITIONER

## 2021-04-08 PROCEDURE — 99214 PR OFFICE/OUTPT VISIT, EST, LEVL IV, 30-39 MIN: ICD-10-PCS | Mod: 25,S$PBB,, | Performed by: NURSE PRACTITIONER

## 2021-04-08 PROCEDURE — 57160 INSERT PESSARY/OTHER DEVICE: CPT | Mod: PBBFAC | Performed by: NURSE PRACTITIONER

## 2021-04-08 PROCEDURE — 99213 OFFICE O/P EST LOW 20 MIN: CPT | Mod: PBBFAC | Performed by: NURSE PRACTITIONER

## 2021-04-08 PROCEDURE — 99999 PR PBB SHADOW E&M-EST. PATIENT-LVL III: ICD-10-PCS | Mod: PBBFAC,,, | Performed by: NURSE PRACTITIONER

## 2021-04-08 PROCEDURE — 99999 PR PBB SHADOW E&M-EST. PATIENT-LVL III: CPT | Mod: PBBFAC,,, | Performed by: NURSE PRACTITIONER

## 2021-04-08 PROCEDURE — 99214 OFFICE O/P EST MOD 30 MIN: CPT | Mod: 25,S$PBB,, | Performed by: NURSE PRACTITIONER

## 2021-04-08 PROCEDURE — 81002 URINALYSIS NONAUTO W/O SCOPE: CPT | Mod: PBBFAC | Performed by: NURSE PRACTITIONER

## 2021-05-14 ENCOUNTER — HOSPITAL ENCOUNTER (OUTPATIENT)
Facility: HOSPITAL | Age: 76
Discharge: HOME | End: 2021-05-16
Attending: EMERGENCY MEDICINE | Admitting: SURGERY
Payer: MEDICARE

## 2021-05-14 DIAGNOSIS — R11.14 BILIOUS VOMITING WITH NAUSEA: ICD-10-CM

## 2021-05-14 DIAGNOSIS — R10.9 ABDOMINAL PAIN, UNSPECIFIED ABDOMINAL LOCATION: ICD-10-CM

## 2021-05-14 DIAGNOSIS — K56.609 SBO (SMALL BOWEL OBSTRUCTION): ICD-10-CM

## 2021-05-14 DIAGNOSIS — K59.00 CONSTIPATION, UNSPECIFIED CONSTIPATION TYPE: ICD-10-CM

## 2021-05-14 DIAGNOSIS — K59.00 CONSTIPATION: ICD-10-CM

## 2021-05-14 DIAGNOSIS — K56.609 SMALL BOWEL OBSTRUCTION: ICD-10-CM

## 2021-05-14 DIAGNOSIS — K56.50 SMALL BOWEL OBSTRUCTION DUE TO ADHESIONS: Primary | ICD-10-CM

## 2021-05-14 LAB
BASOPHILS # BLD AUTO: 0.04 K/UL (ref 0–0.2)
BASOPHILS NFR BLD: 0.6 % (ref 0–1.9)
BILIRUB UR QL STRIP: NEGATIVE
BUN SERPL-MCNC: 17 MG/DL (ref 6–30)
CHLORIDE SERPL-SCNC: 99 MMOL/L (ref 95–110)
CLARITY UR REFRACT.AUTO: CLEAR
COLOR UR AUTO: NORMAL
CREAT SERPL-MCNC: 0.7 MG/DL (ref 0.5–1.4)
CTP QC/QA: YES
DIFFERENTIAL METHOD: ABNORMAL
EOSINOPHIL # BLD AUTO: 0.3 K/UL (ref 0–0.5)
EOSINOPHIL NFR BLD: 4.3 % (ref 0–8)
ERYTHROCYTE [DISTWIDTH] IN BLOOD BY AUTOMATED COUNT: 12.4 % (ref 11.5–14.5)
GLUCOSE SERPL-MCNC: 204 MG/DL (ref 70–110)
GLUCOSE UR QL STRIP: NEGATIVE
HCT VFR BLD AUTO: 37.9 % (ref 37–48.5)
HCT VFR BLD CALC: 38 %PCV (ref 36–54)
HGB BLD-MCNC: 12.5 G/DL (ref 12–16)
HGB UR QL STRIP: NEGATIVE
IMM GRANULOCYTES # BLD AUTO: 0.03 K/UL (ref 0–0.04)
IMM GRANULOCYTES NFR BLD AUTO: 0.4 % (ref 0–0.5)
INR PPP: 1 (ref 0.8–1.2)
KETONES UR QL STRIP: NEGATIVE
LACTATE SERPL-SCNC: 1.7 MMOL/L (ref 0.5–2.2)
LEUKOCYTE ESTERASE UR QL STRIP: NEGATIVE
LYMPHOCYTES # BLD AUTO: 1.2 K/UL (ref 1–4.8)
LYMPHOCYTES NFR BLD: 18.3 % (ref 18–48)
MCH RBC QN AUTO: 32.6 PG (ref 27–31)
MCHC RBC AUTO-ENTMCNC: 33 G/DL (ref 32–36)
MCV RBC AUTO: 99 FL (ref 82–98)
MONOCYTES # BLD AUTO: 0.7 K/UL (ref 0.3–1)
MONOCYTES NFR BLD: 10.1 % (ref 4–15)
NEUTROPHILS # BLD AUTO: 4.4 K/UL (ref 1.8–7.7)
NEUTROPHILS NFR BLD: 66.3 % (ref 38–73)
NITRITE UR QL STRIP: NEGATIVE
NRBC BLD-RTO: 0 /100 WBC
PH UR STRIP: 6 [PH] (ref 5–8)
PLATELET # BLD AUTO: 96 K/UL (ref 150–450)
PMV BLD AUTO: 10.3 FL (ref 9.2–12.9)
POC IONIZED CALCIUM: 1.22 MMOL/L (ref 1.06–1.42)
POC TCO2 (MEASURED): 28 MMOL/L (ref 23–29)
POCT GLUCOSE: 157 MG/DL (ref 70–110)
POTASSIUM BLD-SCNC: 4.2 MMOL/L (ref 3.5–5.1)
PROT UR QL STRIP: NEGATIVE
PROTHROMBIN TIME: 10.9 SEC (ref 9–12.5)
RBC # BLD AUTO: 3.83 M/UL (ref 4–5.4)
SAMPLE: ABNORMAL
SARS-COV-2 RDRP RESP QL NAA+PROBE: NEGATIVE
SODIUM BLD-SCNC: 136 MMOL/L (ref 136–145)
SP GR UR STRIP: 1.02 (ref 1–1.03)
T4 FREE SERPL-MCNC: 1.15 NG/DL (ref 0.71–1.51)
TSH SERPL DL<=0.005 MIU/L-ACNC: 0.39 UIU/ML (ref 0.4–4)
URN SPEC COLLECT METH UR: NORMAL
WBC # BLD AUTO: 6.71 K/UL (ref 3.9–12.7)

## 2021-05-14 PROCEDURE — G0378 HOSPITAL OBSERVATION PER HR: HCPCS

## 2021-05-14 PROCEDURE — 80047 BASIC METABLC PNL IONIZED CA: CPT

## 2021-05-14 PROCEDURE — 84443 ASSAY THYROID STIM HORMONE: CPT | Performed by: EMERGENCY MEDICINE

## 2021-05-14 PROCEDURE — 86803 HEPATITIS C AB TEST: CPT | Performed by: EMERGENCY MEDICINE

## 2021-05-14 PROCEDURE — 63600175 PHARM REV CODE 636 W HCPCS: Performed by: STUDENT IN AN ORGANIZED HEALTH CARE EDUCATION/TRAINING PROGRAM

## 2021-05-14 PROCEDURE — 25500020 PHARM REV CODE 255: Performed by: EMERGENCY MEDICINE

## 2021-05-14 PROCEDURE — 83605 ASSAY OF LACTIC ACID: CPT | Performed by: EMERGENCY MEDICINE

## 2021-05-14 PROCEDURE — 96361 HYDRATE IV INFUSION ADD-ON: CPT

## 2021-05-14 PROCEDURE — 99218 PR INITIAL OBSERVATION CARE,LEVL I: ICD-10-PCS | Mod: GC,,, | Performed by: SURGERY

## 2021-05-14 PROCEDURE — 25000003 PHARM REV CODE 250: Performed by: NURSE PRACTITIONER

## 2021-05-14 PROCEDURE — 63600175 PHARM REV CODE 636 W HCPCS: Performed by: EMERGENCY MEDICINE

## 2021-05-14 PROCEDURE — 99285 EMERGENCY DEPT VISIT HI MDM: CPT | Mod: CS,,, | Performed by: EMERGENCY MEDICINE

## 2021-05-14 PROCEDURE — S0030 INJECTION, METRONIDAZOLE: HCPCS | Performed by: STUDENT IN AN ORGANIZED HEALTH CARE EDUCATION/TRAINING PROGRAM

## 2021-05-14 PROCEDURE — 85610 PROTHROMBIN TIME: CPT | Performed by: EMERGENCY MEDICINE

## 2021-05-14 PROCEDURE — 25000003 PHARM REV CODE 250: Performed by: STUDENT IN AN ORGANIZED HEALTH CARE EDUCATION/TRAINING PROGRAM

## 2021-05-14 PROCEDURE — U0002 COVID-19 LAB TEST NON-CDC: HCPCS | Performed by: EMERGENCY MEDICINE

## 2021-05-14 PROCEDURE — 96375 TX/PRO/DX INJ NEW DRUG ADDON: CPT

## 2021-05-14 PROCEDURE — 82962 GLUCOSE BLOOD TEST: CPT

## 2021-05-14 PROCEDURE — 96365 THER/PROPH/DIAG IV INF INIT: CPT | Mod: 59

## 2021-05-14 PROCEDURE — 99218 PR INITIAL OBSERVATION CARE,LEVL I: CPT | Mod: GC,,, | Performed by: SURGERY

## 2021-05-14 PROCEDURE — 84439 ASSAY OF FREE THYROXINE: CPT | Performed by: EMERGENCY MEDICINE

## 2021-05-14 PROCEDURE — 96376 TX/PRO/DX INJ SAME DRUG ADON: CPT

## 2021-05-14 PROCEDURE — 81003 URINALYSIS AUTO W/O SCOPE: CPT | Performed by: NURSE PRACTITIONER

## 2021-05-14 PROCEDURE — A4216 STERILE WATER/SALINE, 10 ML: HCPCS | Performed by: STUDENT IN AN ORGANIZED HEALTH CARE EDUCATION/TRAINING PROGRAM

## 2021-05-14 PROCEDURE — 99285 EMERGENCY DEPT VISIT HI MDM: CPT | Mod: 25

## 2021-05-14 PROCEDURE — 85025 COMPLETE CBC W/AUTO DIFF WBC: CPT | Performed by: NURSE PRACTITIONER

## 2021-05-14 PROCEDURE — 99285 PR EMERGENCY DEPT VISIT,LEVEL V: ICD-10-PCS | Mod: CS,,, | Performed by: EMERGENCY MEDICINE

## 2021-05-14 RX ORDER — DULOXETIN HYDROCHLORIDE 30 MG/1
30 CAPSULE, DELAYED RELEASE ORAL 2 TIMES DAILY
Status: DISCONTINUED | OUTPATIENT
Start: 2021-05-14 | End: 2021-05-16 | Stop reason: HOSPADM

## 2021-05-14 RX ORDER — CEFTRIAXONE 1 G/1
1 INJECTION, POWDER, FOR SOLUTION INTRAMUSCULAR; INTRAVENOUS
Status: DISCONTINUED | OUTPATIENT
Start: 2021-05-14 | End: 2021-05-16

## 2021-05-14 RX ORDER — ENOXAPARIN SODIUM 100 MG/ML
40 INJECTION SUBCUTANEOUS EVERY 24 HOURS
Status: DISCONTINUED | OUTPATIENT
Start: 2021-05-15 | End: 2021-05-16 | Stop reason: HOSPADM

## 2021-05-14 RX ORDER — LEVOTHYROXINE SODIUM 100 UG/1
100 TABLET ORAL EVERY MORNING
Status: DISCONTINUED | OUTPATIENT
Start: 2021-05-15 | End: 2021-05-16 | Stop reason: HOSPADM

## 2021-05-14 RX ORDER — ATORVASTATIN CALCIUM 20 MG/1
40 TABLET, FILM COATED ORAL DAILY
Status: DISCONTINUED | OUTPATIENT
Start: 2021-05-15 | End: 2021-05-16 | Stop reason: HOSPADM

## 2021-05-14 RX ORDER — MORPHINE SULFATE 2 MG/ML
1 INJECTION, SOLUTION INTRAMUSCULAR; INTRAVENOUS EVERY 4 HOURS PRN
Status: DISCONTINUED | OUTPATIENT
Start: 2021-05-14 | End: 2021-05-15

## 2021-05-14 RX ORDER — MORPHINE SULFATE 2 MG/ML
2 INJECTION, SOLUTION INTRAMUSCULAR; INTRAVENOUS EVERY 4 HOURS PRN
Status: DISCONTINUED | OUTPATIENT
Start: 2021-05-14 | End: 2021-05-15

## 2021-05-14 RX ORDER — MONTELUKAST SODIUM 10 MG/1
10 TABLET ORAL DAILY
Status: DISCONTINUED | OUTPATIENT
Start: 2021-05-15 | End: 2021-05-16 | Stop reason: HOSPADM

## 2021-05-14 RX ORDER — GLUCAGON 1 MG
1 KIT INJECTION
Status: DISCONTINUED | OUTPATIENT
Start: 2021-05-14 | End: 2021-05-16 | Stop reason: HOSPADM

## 2021-05-14 RX ORDER — INSULIN ASPART 100 [IU]/ML
0-5 INJECTION, SOLUTION INTRAVENOUS; SUBCUTANEOUS EVERY 6 HOURS PRN
Status: DISCONTINUED | OUTPATIENT
Start: 2021-05-14 | End: 2021-05-16 | Stop reason: HOSPADM

## 2021-05-14 RX ORDER — BUTALBITAL, ACETAMINOPHEN AND CAFFEINE 50; 325; 40 MG/1; MG/1; MG/1
1 TABLET ORAL EVERY 6 HOURS PRN
Status: DISCONTINUED | OUTPATIENT
Start: 2021-05-14 | End: 2021-05-16 | Stop reason: HOSPADM

## 2021-05-14 RX ORDER — MORPHINE SULFATE 4 MG/ML
4 INJECTION, SOLUTION INTRAMUSCULAR; INTRAVENOUS
Status: COMPLETED | OUTPATIENT
Start: 2021-05-14 | End: 2021-05-14

## 2021-05-14 RX ORDER — ONDANSETRON 2 MG/ML
4 INJECTION INTRAMUSCULAR; INTRAVENOUS
Status: COMPLETED | OUTPATIENT
Start: 2021-05-14 | End: 2021-05-14

## 2021-05-14 RX ORDER — OXYBUTYNIN CHLORIDE 5 MG/1
5 TABLET, EXTENDED RELEASE ORAL DAILY
Refills: 3 | Status: DISCONTINUED | OUTPATIENT
Start: 2021-05-15 | End: 2021-05-14

## 2021-05-14 RX ORDER — SODIUM CHLORIDE, SODIUM LACTATE, POTASSIUM CHLORIDE, CALCIUM CHLORIDE 600; 310; 30; 20 MG/100ML; MG/100ML; MG/100ML; MG/100ML
INJECTION, SOLUTION INTRAVENOUS CONTINUOUS
Status: DISCONTINUED | OUTPATIENT
Start: 2021-05-14 | End: 2021-05-15

## 2021-05-14 RX ORDER — SODIUM CHLORIDE 0.9 % (FLUSH) 0.9 %
3 SYRINGE (ML) INJECTION EVERY 8 HOURS
Status: DISCONTINUED | OUTPATIENT
Start: 2021-05-14 | End: 2021-05-16 | Stop reason: HOSPADM

## 2021-05-14 RX ORDER — ONDANSETRON 2 MG/ML
4 INJECTION INTRAMUSCULAR; INTRAVENOUS EVERY 6 HOURS PRN
Status: DISCONTINUED | OUTPATIENT
Start: 2021-05-14 | End: 2021-05-16 | Stop reason: HOSPADM

## 2021-05-14 RX ORDER — METRONIDAZOLE 500 MG/100ML
500 INJECTION, SOLUTION INTRAVENOUS
Status: DISCONTINUED | OUTPATIENT
Start: 2021-05-14 | End: 2021-05-16

## 2021-05-14 RX ADMIN — METRONIDAZOLE 500 MG: 500 INJECTION, SOLUTION INTRAVENOUS at 08:05

## 2021-05-14 RX ADMIN — SODIUM CHLORIDE, SODIUM LACTATE, POTASSIUM CHLORIDE, AND CALCIUM CHLORIDE: .6; .31; .03; .02 INJECTION, SOLUTION INTRAVENOUS at 08:05

## 2021-05-14 RX ADMIN — Medication 3 ML: at 11:05

## 2021-05-14 RX ADMIN — CEFTRIAXONE 1 G: 1 INJECTION, POWDER, FOR SOLUTION INTRAMUSCULAR; INTRAVENOUS at 08:05

## 2021-05-14 RX ADMIN — MORPHINE SULFATE 2 MG: 2 INJECTION, SOLUTION INTRAMUSCULAR; INTRAVENOUS at 11:05

## 2021-05-14 RX ADMIN — DULOXETINE HYDROCHLORIDE 30 MG: 30 CAPSULE, DELAYED RELEASE ORAL at 08:05

## 2021-05-14 RX ADMIN — IOHEXOL 75 ML: 350 INJECTION, SOLUTION INTRAVENOUS at 05:05

## 2021-05-14 RX ADMIN — ONDANSETRON 4 MG: 2 INJECTION INTRAMUSCULAR; INTRAVENOUS at 06:05

## 2021-05-14 RX ADMIN — SODIUM CHLORIDE 1000 ML: 0.9 INJECTION, SOLUTION INTRAVENOUS at 03:05

## 2021-05-14 RX ADMIN — MORPHINE SULFATE 4 MG: 4 INJECTION INTRAVENOUS at 06:05

## 2021-05-15 LAB
ALBUMIN SERPL BCP-MCNC: 3.3 G/DL (ref 3.5–5.2)
ALP SERPL-CCNC: 53 U/L (ref 55–135)
ALT SERPL W/O P-5'-P-CCNC: 21 U/L (ref 10–44)
ANION GAP SERPL CALC-SCNC: 8 MMOL/L (ref 8–16)
AST SERPL-CCNC: 20 U/L (ref 10–40)
BASOPHILS # BLD AUTO: 0.05 K/UL (ref 0–0.2)
BASOPHILS NFR BLD: 1 % (ref 0–1.9)
BILIRUB SERPL-MCNC: 1.1 MG/DL (ref 0.1–1)
BUN SERPL-MCNC: 11 MG/DL (ref 8–23)
CALCIUM SERPL-MCNC: 9.8 MG/DL (ref 8.7–10.5)
CHLORIDE SERPL-SCNC: 106 MMOL/L (ref 95–110)
CO2 SERPL-SCNC: 27 MMOL/L (ref 23–29)
CREAT SERPL-MCNC: 0.8 MG/DL (ref 0.5–1.4)
DIFFERENTIAL METHOD: ABNORMAL
EOSINOPHIL # BLD AUTO: 0.3 K/UL (ref 0–0.5)
EOSINOPHIL NFR BLD: 5.4 % (ref 0–8)
ERYTHROCYTE [DISTWIDTH] IN BLOOD BY AUTOMATED COUNT: 12.5 % (ref 11.5–14.5)
EST. GFR  (AFRICAN AMERICAN): >60 ML/MIN/1.73 M^2
EST. GFR  (NON AFRICAN AMERICAN): >60 ML/MIN/1.73 M^2
GLUCOSE SERPL-MCNC: 153 MG/DL (ref 70–110)
HCT VFR BLD AUTO: 35 % (ref 37–48.5)
HGB BLD-MCNC: 11.5 G/DL (ref 12–16)
IMM GRANULOCYTES # BLD AUTO: 0.01 K/UL (ref 0–0.04)
IMM GRANULOCYTES NFR BLD AUTO: 0.2 % (ref 0–0.5)
LYMPHOCYTES # BLD AUTO: 1.6 K/UL (ref 1–4.8)
LYMPHOCYTES NFR BLD: 31.8 % (ref 18–48)
MAGNESIUM SERPL-MCNC: 1.6 MG/DL (ref 1.6–2.6)
MCH RBC QN AUTO: 33 PG (ref 27–31)
MCHC RBC AUTO-ENTMCNC: 32.9 G/DL (ref 32–36)
MCV RBC AUTO: 101 FL (ref 82–98)
MONOCYTES # BLD AUTO: 0.6 K/UL (ref 0.3–1)
MONOCYTES NFR BLD: 11.3 % (ref 4–15)
NEUTROPHILS # BLD AUTO: 2.5 K/UL (ref 1.8–7.7)
NEUTROPHILS NFR BLD: 50.3 % (ref 38–73)
NRBC BLD-RTO: 0 /100 WBC
PHOSPHATE SERPL-MCNC: 4.1 MG/DL (ref 2.7–4.5)
PLATELET # BLD AUTO: 83 K/UL (ref 150–450)
PMV BLD AUTO: 11 FL (ref 9.2–12.9)
POCT GLUCOSE: 153 MG/DL (ref 70–110)
POCT GLUCOSE: 156 MG/DL (ref 70–110)
POCT GLUCOSE: 169 MG/DL (ref 70–110)
POCT GLUCOSE: 178 MG/DL (ref 70–110)
POTASSIUM SERPL-SCNC: 4.5 MMOL/L (ref 3.5–5.1)
PROT SERPL-MCNC: 6.1 G/DL (ref 6–8.4)
RBC # BLD AUTO: 3.48 M/UL (ref 4–5.4)
SODIUM SERPL-SCNC: 141 MMOL/L (ref 136–145)
WBC # BLD AUTO: 4.97 K/UL (ref 3.9–12.7)

## 2021-05-15 PROCEDURE — G0378 HOSPITAL OBSERVATION PER HR: HCPCS

## 2021-05-15 PROCEDURE — 84100 ASSAY OF PHOSPHORUS: CPT | Performed by: STUDENT IN AN ORGANIZED HEALTH CARE EDUCATION/TRAINING PROGRAM

## 2021-05-15 PROCEDURE — 63600175 PHARM REV CODE 636 W HCPCS: Performed by: STUDENT IN AN ORGANIZED HEALTH CARE EDUCATION/TRAINING PROGRAM

## 2021-05-15 PROCEDURE — 36415 COLL VENOUS BLD VENIPUNCTURE: CPT | Performed by: STUDENT IN AN ORGANIZED HEALTH CARE EDUCATION/TRAINING PROGRAM

## 2021-05-15 PROCEDURE — 83735 ASSAY OF MAGNESIUM: CPT | Performed by: STUDENT IN AN ORGANIZED HEALTH CARE EDUCATION/TRAINING PROGRAM

## 2021-05-15 PROCEDURE — 25500020 PHARM REV CODE 255: Performed by: STUDENT IN AN ORGANIZED HEALTH CARE EDUCATION/TRAINING PROGRAM

## 2021-05-15 PROCEDURE — 99225 PR SUBSEQUENT OBSERVATION CARE,LEVEL II: ICD-10-PCS | Mod: GC,,, | Performed by: SURGERY

## 2021-05-15 PROCEDURE — 99225 PR SUBSEQUENT OBSERVATION CARE,LEVEL II: CPT | Mod: GC,,, | Performed by: SURGERY

## 2021-05-15 PROCEDURE — S0030 INJECTION, METRONIDAZOLE: HCPCS | Performed by: STUDENT IN AN ORGANIZED HEALTH CARE EDUCATION/TRAINING PROGRAM

## 2021-05-15 PROCEDURE — 96366 THER/PROPH/DIAG IV INF ADDON: CPT

## 2021-05-15 PROCEDURE — 96376 TX/PRO/DX INJ SAME DRUG ADON: CPT

## 2021-05-15 PROCEDURE — 25000003 PHARM REV CODE 250: Performed by: STUDENT IN AN ORGANIZED HEALTH CARE EDUCATION/TRAINING PROGRAM

## 2021-05-15 PROCEDURE — 85025 COMPLETE CBC W/AUTO DIFF WBC: CPT | Performed by: STUDENT IN AN ORGANIZED HEALTH CARE EDUCATION/TRAINING PROGRAM

## 2021-05-15 PROCEDURE — 96372 THER/PROPH/DIAG INJ SC/IM: CPT

## 2021-05-15 PROCEDURE — 80053 COMPREHEN METABOLIC PANEL: CPT | Performed by: STUDENT IN AN ORGANIZED HEALTH CARE EDUCATION/TRAINING PROGRAM

## 2021-05-15 PROCEDURE — 96361 HYDRATE IV INFUSION ADD-ON: CPT

## 2021-05-15 PROCEDURE — 96375 TX/PRO/DX INJ NEW DRUG ADDON: CPT

## 2021-05-15 PROCEDURE — A4216 STERILE WATER/SALINE, 10 ML: HCPCS | Performed by: STUDENT IN AN ORGANIZED HEALTH CARE EDUCATION/TRAINING PROGRAM

## 2021-05-15 RX ORDER — ACETAMINOPHEN 325 MG/1
650 TABLET ORAL EVERY 6 HOURS PRN
Status: DISCONTINUED | OUTPATIENT
Start: 2021-05-15 | End: 2021-05-16 | Stop reason: HOSPADM

## 2021-05-15 RX ORDER — MAGNESIUM SULFATE HEPTAHYDRATE 40 MG/ML
2 INJECTION, SOLUTION INTRAVENOUS ONCE
Status: COMPLETED | OUTPATIENT
Start: 2021-05-15 | End: 2021-05-15

## 2021-05-15 RX ORDER — IBUPROFEN 400 MG/1
400 TABLET ORAL EVERY 6 HOURS PRN
Status: DISCONTINUED | OUTPATIENT
Start: 2021-05-15 | End: 2021-05-16 | Stop reason: HOSPADM

## 2021-05-15 RX ORDER — MAGNESIUM SULFATE 1 G/100ML
1 INJECTION INTRAVENOUS ONCE
Status: COMPLETED | OUTPATIENT
Start: 2021-05-15 | End: 2021-05-15

## 2021-05-15 RX ADMIN — MORPHINE SULFATE 2 MG: 2 INJECTION, SOLUTION INTRAMUSCULAR; INTRAVENOUS at 09:05

## 2021-05-15 RX ADMIN — SODIUM CHLORIDE, SODIUM LACTATE, POTASSIUM CHLORIDE, AND CALCIUM CHLORIDE: .6; .31; .03; .02 INJECTION, SOLUTION INTRAVENOUS at 05:05

## 2021-05-15 RX ADMIN — DULOXETINE HYDROCHLORIDE 30 MG: 30 CAPSULE, DELAYED RELEASE ORAL at 09:05

## 2021-05-15 RX ADMIN — LEVOTHYROXINE SODIUM 100 MCG: 100 TABLET ORAL at 06:05

## 2021-05-15 RX ADMIN — MONTELUKAST 10 MG: 10 TABLET, FILM COATED ORAL at 09:05

## 2021-05-15 RX ADMIN — METRONIDAZOLE 500 MG: 500 INJECTION, SOLUTION INTRAVENOUS at 12:05

## 2021-05-15 RX ADMIN — CEFTRIAXONE 1 G: 1 INJECTION, POWDER, FOR SOLUTION INTRAMUSCULAR; INTRAVENOUS at 08:05

## 2021-05-15 RX ADMIN — DIATRIZOATE MEGLUMINE AND DIATRIZOATE SODIUM 100 ML: 660; 100 LIQUID ORAL; RECTAL at 09:05

## 2021-05-15 RX ADMIN — SODIUM CHLORIDE, SODIUM LACTATE, POTASSIUM CHLORIDE, AND CALCIUM CHLORIDE: .6; .31; .03; .02 INJECTION, SOLUTION INTRAVENOUS at 03:05

## 2021-05-15 RX ADMIN — ENOXAPARIN SODIUM 40 MG: 40 INJECTION SUBCUTANEOUS at 05:05

## 2021-05-15 RX ADMIN — METRONIDAZOLE 500 MG: 500 INJECTION, SOLUTION INTRAVENOUS at 08:05

## 2021-05-15 RX ADMIN — DULOXETINE HYDROCHLORIDE 30 MG: 30 CAPSULE, DELAYED RELEASE ORAL at 08:05

## 2021-05-15 RX ADMIN — MAGNESIUM SULFATE 2 G: 2 INJECTION INTRAVENOUS at 01:05

## 2021-05-15 RX ADMIN — METRONIDAZOLE 500 MG: 500 INJECTION, SOLUTION INTRAVENOUS at 05:05

## 2021-05-15 RX ADMIN — Medication 3 ML: at 02:05

## 2021-05-15 RX ADMIN — ACETAMINOPHEN 650 MG: 325 TABLET ORAL at 06:05

## 2021-05-15 RX ADMIN — Medication 3 ML: at 09:05

## 2021-05-15 RX ADMIN — ATORVASTATIN CALCIUM 40 MG: 20 TABLET, FILM COATED ORAL at 09:05

## 2021-05-15 RX ADMIN — MORPHINE SULFATE 2 MG: 2 INJECTION, SOLUTION INTRAMUSCULAR; INTRAVENOUS at 04:05

## 2021-05-15 RX ADMIN — MAGNESIUM SULFATE HEPTAHYDRATE 1 G: 500 INJECTION, SOLUTION INTRAMUSCULAR; INTRAVENOUS at 12:05

## 2021-05-16 VITALS
WEIGHT: 131.38 LBS | SYSTOLIC BLOOD PRESSURE: 115 MMHG | DIASTOLIC BLOOD PRESSURE: 58 MMHG | OXYGEN SATURATION: 96 % | HEART RATE: 63 BPM | TEMPERATURE: 98 F | BODY MASS INDEX: 24.18 KG/M2 | RESPIRATION RATE: 18 BRPM | HEIGHT: 62 IN

## 2021-05-16 PROBLEM — K56.50 SMALL BOWEL OBSTRUCTION DUE TO ADHESIONS: Status: RESOLVED | Noted: 2021-05-14 | Resolved: 2021-05-16

## 2021-05-16 PROBLEM — R10.9 ABDOMINAL PAIN: Status: RESOLVED | Noted: 2018-04-17 | Resolved: 2021-05-16

## 2021-05-16 PROBLEM — K56.609 SMALL BOWEL OBSTRUCTION: Status: RESOLVED | Noted: 2021-02-26 | Resolved: 2021-05-16

## 2021-05-16 LAB
ALBUMIN SERPL BCP-MCNC: 3.1 G/DL (ref 3.5–5.2)
ALP SERPL-CCNC: 62 U/L (ref 55–135)
ALT SERPL W/O P-5'-P-CCNC: 34 U/L (ref 10–44)
ANION GAP SERPL CALC-SCNC: 8 MMOL/L (ref 8–16)
AST SERPL-CCNC: 38 U/L (ref 10–40)
BASOPHILS # BLD AUTO: 0.02 K/UL (ref 0–0.2)
BASOPHILS NFR BLD: 0.5 % (ref 0–1.9)
BILIRUB SERPL-MCNC: 1 MG/DL (ref 0.1–1)
BUN SERPL-MCNC: 8 MG/DL (ref 8–23)
CALCIUM SERPL-MCNC: 8.3 MG/DL (ref 8.7–10.5)
CHLORIDE SERPL-SCNC: 105 MMOL/L (ref 95–110)
CO2 SERPL-SCNC: 26 MMOL/L (ref 23–29)
CREAT SERPL-MCNC: 0.7 MG/DL (ref 0.5–1.4)
DIFFERENTIAL METHOD: ABNORMAL
EOSINOPHIL # BLD AUTO: 0.2 K/UL (ref 0–0.5)
EOSINOPHIL NFR BLD: 6.1 % (ref 0–8)
ERYTHROCYTE [DISTWIDTH] IN BLOOD BY AUTOMATED COUNT: 12.2 % (ref 11.5–14.5)
EST. GFR  (AFRICAN AMERICAN): >60 ML/MIN/1.73 M^2
EST. GFR  (NON AFRICAN AMERICAN): >60 ML/MIN/1.73 M^2
GLUCOSE SERPL-MCNC: 140 MG/DL (ref 70–110)
HCT VFR BLD AUTO: 32.6 % (ref 37–48.5)
HGB BLD-MCNC: 10.9 G/DL (ref 12–16)
IMM GRANULOCYTES # BLD AUTO: 0.01 K/UL (ref 0–0.04)
IMM GRANULOCYTES NFR BLD AUTO: 0.3 % (ref 0–0.5)
LYMPHOCYTES # BLD AUTO: 1.1 K/UL (ref 1–4.8)
LYMPHOCYTES NFR BLD: 28.4 % (ref 18–48)
MAGNESIUM SERPL-MCNC: 1.6 MG/DL (ref 1.6–2.6)
MCH RBC QN AUTO: 32.5 PG (ref 27–31)
MCHC RBC AUTO-ENTMCNC: 33.4 G/DL (ref 32–36)
MCV RBC AUTO: 97 FL (ref 82–98)
MONOCYTES # BLD AUTO: 0.5 K/UL (ref 0.3–1)
MONOCYTES NFR BLD: 12.2 % (ref 4–15)
NEUTROPHILS # BLD AUTO: 2.1 K/UL (ref 1.8–7.7)
NEUTROPHILS NFR BLD: 52.5 % (ref 38–73)
NRBC BLD-RTO: 0 /100 WBC
PHOSPHATE SERPL-MCNC: 3.5 MG/DL (ref 2.7–4.5)
PLATELET # BLD AUTO: 70 K/UL (ref 150–450)
PMV BLD AUTO: 10.3 FL (ref 9.2–12.9)
POCT GLUCOSE: 154 MG/DL (ref 70–110)
POCT GLUCOSE: 154 MG/DL (ref 70–110)
POCT GLUCOSE: 195 MG/DL (ref 70–110)
POTASSIUM SERPL-SCNC: 3.7 MMOL/L (ref 3.5–5.1)
PROT SERPL-MCNC: 5.6 G/DL (ref 6–8.4)
RBC # BLD AUTO: 3.35 M/UL (ref 4–5.4)
SODIUM SERPL-SCNC: 139 MMOL/L (ref 136–145)
WBC # BLD AUTO: 3.94 K/UL (ref 3.9–12.7)

## 2021-05-16 PROCEDURE — 85025 COMPLETE CBC W/AUTO DIFF WBC: CPT | Performed by: STUDENT IN AN ORGANIZED HEALTH CARE EDUCATION/TRAINING PROGRAM

## 2021-05-16 PROCEDURE — 84100 ASSAY OF PHOSPHORUS: CPT | Performed by: STUDENT IN AN ORGANIZED HEALTH CARE EDUCATION/TRAINING PROGRAM

## 2021-05-16 PROCEDURE — 80053 COMPREHEN METABOLIC PANEL: CPT | Performed by: STUDENT IN AN ORGANIZED HEALTH CARE EDUCATION/TRAINING PROGRAM

## 2021-05-16 PROCEDURE — 36415 COLL VENOUS BLD VENIPUNCTURE: CPT | Performed by: STUDENT IN AN ORGANIZED HEALTH CARE EDUCATION/TRAINING PROGRAM

## 2021-05-16 PROCEDURE — 25000003 PHARM REV CODE 250: Performed by: STUDENT IN AN ORGANIZED HEALTH CARE EDUCATION/TRAINING PROGRAM

## 2021-05-16 PROCEDURE — 96376 TX/PRO/DX INJ SAME DRUG ADON: CPT

## 2021-05-16 PROCEDURE — S0030 INJECTION, METRONIDAZOLE: HCPCS | Performed by: STUDENT IN AN ORGANIZED HEALTH CARE EDUCATION/TRAINING PROGRAM

## 2021-05-16 PROCEDURE — 83735 ASSAY OF MAGNESIUM: CPT | Performed by: STUDENT IN AN ORGANIZED HEALTH CARE EDUCATION/TRAINING PROGRAM

## 2021-05-16 PROCEDURE — A4216 STERILE WATER/SALINE, 10 ML: HCPCS | Performed by: STUDENT IN AN ORGANIZED HEALTH CARE EDUCATION/TRAINING PROGRAM

## 2021-05-16 PROCEDURE — G0378 HOSPITAL OBSERVATION PER HR: HCPCS

## 2021-05-16 RX ORDER — METRONIDAZOLE 500 MG/1
500 TABLET ORAL EVERY 8 HOURS
Qty: 12 TABLET | Refills: 0 | Status: SHIPPED | OUTPATIENT
Start: 2021-05-16 | End: 2021-05-20

## 2021-05-16 RX ORDER — CIPROFLOXACIN 500 MG/1
500 TABLET ORAL EVERY 12 HOURS
Qty: 8 TABLET | Refills: 0 | Status: SHIPPED | OUTPATIENT
Start: 2021-05-16 | End: 2021-05-20

## 2021-05-16 RX ORDER — METRONIDAZOLE 500 MG/1
500 TABLET ORAL 3 TIMES DAILY
Qty: 12 TABLET | Refills: 0 | Status: SHIPPED | OUTPATIENT
Start: 2021-05-16 | End: 2021-05-20

## 2021-05-16 RX ORDER — METRONIDAZOLE 500 MG/1
500 TABLET ORAL EVERY 8 HOURS
Status: DISCONTINUED | OUTPATIENT
Start: 2021-05-16 | End: 2021-05-16 | Stop reason: HOSPADM

## 2021-05-16 RX ORDER — CIPROFLOXACIN 500 MG/1
500 TABLET ORAL EVERY 12 HOURS
Status: DISCONTINUED | OUTPATIENT
Start: 2021-05-16 | End: 2021-05-16 | Stop reason: HOSPADM

## 2021-05-16 RX ADMIN — Medication 3 ML: at 02:05

## 2021-05-16 RX ADMIN — Medication 3 ML: at 05:05

## 2021-05-16 RX ADMIN — DULOXETINE HYDROCHLORIDE 30 MG: 30 CAPSULE, DELAYED RELEASE ORAL at 08:05

## 2021-05-16 RX ADMIN — BUTALBITAL, ACETAMINOPHEN, AND CAFFEINE 1 TABLET: 50; 325; 40 TABLET ORAL at 05:05

## 2021-05-16 RX ADMIN — LEVOTHYROXINE SODIUM 100 MCG: 100 TABLET ORAL at 05:05

## 2021-05-16 RX ADMIN — MONTELUKAST 10 MG: 10 TABLET, FILM COATED ORAL at 08:05

## 2021-05-16 RX ADMIN — CIPROFLOXACIN 500 MG: 500 TABLET, FILM COATED ORAL at 08:05

## 2021-05-16 RX ADMIN — METRONIDAZOLE 500 MG: 500 INJECTION, SOLUTION INTRAVENOUS at 05:05

## 2021-05-16 RX ADMIN — METRONIDAZOLE 500 MG: 500 TABLET ORAL at 02:05

## 2021-05-16 RX ADMIN — ATORVASTATIN CALCIUM 40 MG: 20 TABLET, FILM COATED ORAL at 08:05

## 2021-05-17 ENCOUNTER — TELEPHONE (OUTPATIENT)
Dept: HEMATOLOGY/ONCOLOGY | Facility: CLINIC | Age: 76
End: 2021-05-17

## 2021-05-17 LAB — HCV AB SERPL QL IA: NEGATIVE

## 2021-06-01 ENCOUNTER — LAB VISIT (OUTPATIENT)
Dept: LAB | Facility: HOSPITAL | Age: 76
End: 2021-06-01
Attending: INTERNAL MEDICINE
Payer: MEDICARE

## 2021-06-01 DIAGNOSIS — E53.8 B12 DEFICIENCY: ICD-10-CM

## 2021-06-01 DIAGNOSIS — E11.9 DIABETES MELLITUS, TYPE 2: ICD-10-CM

## 2021-06-01 DIAGNOSIS — D64.9 ANEMIA, UNSPECIFIED TYPE: ICD-10-CM

## 2021-06-01 DIAGNOSIS — E03.4 HYPOTHYROIDISM DUE TO ACQUIRED ATROPHY OF THYROID: ICD-10-CM

## 2021-06-01 DIAGNOSIS — G47.00 INSOMNIA, UNSPECIFIED TYPE: ICD-10-CM

## 2021-06-01 DIAGNOSIS — R19.8 IRREGULAR BOWEL HABITS: ICD-10-CM

## 2021-06-01 LAB
ALBUMIN SERPL BCP-MCNC: 3.9 G/DL (ref 3.5–5.2)
ALP SERPL-CCNC: 61 U/L (ref 55–135)
ALT SERPL W/O P-5'-P-CCNC: 24 U/L (ref 10–44)
ANION GAP SERPL CALC-SCNC: 12 MMOL/L (ref 8–16)
AST SERPL-CCNC: 25 U/L (ref 10–40)
BASOPHILS # BLD AUTO: 0.05 K/UL (ref 0–0.2)
BASOPHILS NFR BLD: 1.1 % (ref 0–1.9)
BILIRUB SERPL-MCNC: 1 MG/DL (ref 0.1–1)
BUN SERPL-MCNC: 14 MG/DL (ref 8–23)
CALCIUM SERPL-MCNC: 9.8 MG/DL (ref 8.7–10.5)
CHLORIDE SERPL-SCNC: 98 MMOL/L (ref 95–110)
CO2 SERPL-SCNC: 25 MMOL/L (ref 23–29)
CREAT SERPL-MCNC: 0.9 MG/DL (ref 0.5–1.4)
DIFFERENTIAL METHOD: ABNORMAL
EOSINOPHIL # BLD AUTO: 0.3 K/UL (ref 0–0.5)
EOSINOPHIL NFR BLD: 5.8 % (ref 0–8)
ERYTHROCYTE [DISTWIDTH] IN BLOOD BY AUTOMATED COUNT: 13 % (ref 11.5–14.5)
EST. GFR  (AFRICAN AMERICAN): >60 ML/MIN/1.73 M^2
EST. GFR  (NON AFRICAN AMERICAN): >60 ML/MIN/1.73 M^2
ESTIMATED AVG GLUCOSE: 157 MG/DL (ref 68–131)
GLUCOSE SERPL-MCNC: 252 MG/DL (ref 70–110)
HBA1C MFR BLD: 7.1 % (ref 4–5.6)
HCT VFR BLD AUTO: 39.5 % (ref 37–48.5)
HGB BLD-MCNC: 12.6 G/DL (ref 12–16)
IMM GRANULOCYTES # BLD AUTO: 0.02 K/UL (ref 0–0.04)
IMM GRANULOCYTES NFR BLD AUTO: 0.4 % (ref 0–0.5)
LYMPHOCYTES # BLD AUTO: 1.1 K/UL (ref 1–4.8)
LYMPHOCYTES NFR BLD: 23.6 % (ref 18–48)
MCH RBC QN AUTO: 32.2 PG (ref 27–31)
MCHC RBC AUTO-ENTMCNC: 31.9 G/DL (ref 32–36)
MCV RBC AUTO: 101 FL (ref 82–98)
MONOCYTES # BLD AUTO: 0.4 K/UL (ref 0.3–1)
MONOCYTES NFR BLD: 9.1 % (ref 4–15)
NEUTROPHILS # BLD AUTO: 2.7 K/UL (ref 1.8–7.7)
NEUTROPHILS NFR BLD: 60 % (ref 38–73)
NRBC BLD-RTO: 0 /100 WBC
PLATELET # BLD AUTO: 99 K/UL (ref 150–450)
PMV BLD AUTO: 11.1 FL (ref 9.2–12.9)
POTASSIUM SERPL-SCNC: 4 MMOL/L (ref 3.5–5.1)
PROT SERPL-MCNC: 7.1 G/DL (ref 6–8.4)
RBC # BLD AUTO: 3.91 M/UL (ref 4–5.4)
SODIUM SERPL-SCNC: 135 MMOL/L (ref 136–145)
T4 FREE SERPL-MCNC: 1.16 NG/DL (ref 0.71–1.51)
TSH SERPL DL<=0.005 MIU/L-ACNC: 0.15 UIU/ML (ref 0.4–4)
WBC # BLD AUTO: 4.5 K/UL (ref 3.9–12.7)

## 2021-06-01 PROCEDURE — 80053 COMPREHEN METABOLIC PANEL: CPT | Performed by: INTERNAL MEDICINE

## 2021-06-01 PROCEDURE — 36415 COLL VENOUS BLD VENIPUNCTURE: CPT | Mod: PN | Performed by: INTERNAL MEDICINE

## 2021-06-01 PROCEDURE — 85025 COMPLETE CBC W/AUTO DIFF WBC: CPT | Performed by: INTERNAL MEDICINE

## 2021-06-01 PROCEDURE — 84443 ASSAY THYROID STIM HORMONE: CPT | Performed by: INTERNAL MEDICINE

## 2021-06-01 PROCEDURE — 83036 HEMOGLOBIN GLYCOSYLATED A1C: CPT | Performed by: INTERNAL MEDICINE

## 2021-06-01 PROCEDURE — 84439 ASSAY OF FREE THYROXINE: CPT | Performed by: INTERNAL MEDICINE

## 2021-06-01 PROCEDURE — 82607 VITAMIN B-12: CPT | Performed by: INTERNAL MEDICINE

## 2021-06-02 LAB — VIT B12 SERPL-MCNC: 653 PG/ML (ref 210–950)

## 2021-06-10 ENCOUNTER — OFFICE VISIT (OUTPATIENT)
Dept: UROGYNECOLOGY | Facility: CLINIC | Age: 76
End: 2021-06-10
Payer: MEDICARE

## 2021-06-10 VITALS
DIASTOLIC BLOOD PRESSURE: 56 MMHG | SYSTOLIC BLOOD PRESSURE: 110 MMHG | HEIGHT: 62 IN | WEIGHT: 131.81 LBS | BODY MASS INDEX: 24.26 KG/M2

## 2021-06-10 DIAGNOSIS — N81.11 MIDLINE CYSTOCELE: Primary | ICD-10-CM

## 2021-06-10 DIAGNOSIS — N95.2 VAGINAL ATROPHY: ICD-10-CM

## 2021-06-10 DIAGNOSIS — N13.30 HYDRONEPHROSIS, UNSPECIFIED HYDRONEPHROSIS TYPE: ICD-10-CM

## 2021-06-10 DIAGNOSIS — N81.6 RECTOCELE: ICD-10-CM

## 2021-06-10 DIAGNOSIS — N39.46 MIXED STRESS AND URGE URINARY INCONTINENCE: ICD-10-CM

## 2021-06-10 PROCEDURE — 99215 OFFICE O/P EST HI 40 MIN: CPT | Mod: PBBFAC | Performed by: NURSE PRACTITIONER

## 2021-06-10 PROCEDURE — 99999 PR PBB SHADOW E&M-EST. PATIENT-LVL V: CPT | Mod: PBBFAC,,, | Performed by: NURSE PRACTITIONER

## 2021-06-10 PROCEDURE — 99999 PR PBB SHADOW E&M-EST. PATIENT-LVL V: ICD-10-PCS | Mod: PBBFAC,,, | Performed by: NURSE PRACTITIONER

## 2021-06-10 PROCEDURE — 99499 NO LOS: ICD-10-PCS | Mod: S$PBB,,, | Performed by: NURSE PRACTITIONER

## 2021-06-10 PROCEDURE — 99499 UNLISTED E&M SERVICE: CPT | Mod: S$PBB,,, | Performed by: NURSE PRACTITIONER

## 2021-06-30 ENCOUNTER — HOSPITAL ENCOUNTER (OUTPATIENT)
Dept: RADIOLOGY | Facility: HOSPITAL | Age: 76
Discharge: HOME | End: 2021-06-30
Attending: NURSE PRACTITIONER
Payer: MEDICARE

## 2021-06-30 DIAGNOSIS — N13.30 HYDRONEPHROSIS, UNSPECIFIED HYDRONEPHROSIS TYPE: ICD-10-CM

## 2021-06-30 PROCEDURE — 76770 US RETROPERITONEAL COMPLETE: ICD-10-PCS | Mod: 26,,, | Performed by: RADIOLOGY

## 2021-06-30 PROCEDURE — 76770 US EXAM ABDO BACK WALL COMP: CPT | Mod: TC

## 2021-06-30 PROCEDURE — 76770 US EXAM ABDO BACK WALL COMP: CPT | Mod: 26,,, | Performed by: RADIOLOGY

## 2021-07-06 ENCOUNTER — TELEPHONE (OUTPATIENT)
Dept: NEUROLOGY | Facility: CLINIC | Age: 76
End: 2021-07-06

## 2021-07-13 ENCOUNTER — HOSPITAL ENCOUNTER (OUTPATIENT)
Dept: RADIOLOGY | Facility: OTHER | Age: 76
Discharge: HOME | End: 2021-07-13
Attending: INTERNAL MEDICINE
Payer: MEDICARE

## 2021-07-13 DIAGNOSIS — Z12.31 ENCOUNTER FOR SCREENING MAMMOGRAM FOR MALIGNANT NEOPLASM OF BREAST: ICD-10-CM

## 2021-07-13 DIAGNOSIS — M85.852 OTHER SPECIFIED DISORDERS OF BONE DENSITY AND STRUCTURE, LEFT THIGH: ICD-10-CM

## 2021-07-13 DIAGNOSIS — M85.80 OSTEOPENIA, UNSPECIFIED LOCATION: ICD-10-CM

## 2021-07-13 PROCEDURE — 77067 MAMMO DIGITAL SCREENING BILAT WITH TOMO: ICD-10-PCS | Mod: 26,,, | Performed by: RADIOLOGY

## 2021-07-13 PROCEDURE — 77080 DXA BONE DENSITY AXIAL: CPT | Mod: 26,,, | Performed by: RADIOLOGY

## 2021-07-13 PROCEDURE — 77080 DXA BONE DENSITY AXIAL: CPT | Mod: TC

## 2021-07-13 PROCEDURE — 77063 MAMMO DIGITAL SCREENING BILAT WITH TOMO: ICD-10-PCS | Mod: 26,,, | Performed by: RADIOLOGY

## 2021-07-13 PROCEDURE — 77063 BREAST TOMOSYNTHESIS BI: CPT | Mod: 26,,, | Performed by: RADIOLOGY

## 2021-07-13 PROCEDURE — 77067 SCR MAMMO BI INCL CAD: CPT | Mod: 26,,, | Performed by: RADIOLOGY

## 2021-07-13 PROCEDURE — 77080 DEXA BONE DENSITY SPINE HIP: ICD-10-PCS | Mod: 26,,, | Performed by: RADIOLOGY

## 2021-07-13 PROCEDURE — 77067 SCR MAMMO BI INCL CAD: CPT | Mod: TC

## 2021-07-15 ENCOUNTER — OFFICE VISIT (OUTPATIENT)
Dept: UROGYNECOLOGY | Facility: CLINIC | Age: 76
End: 2021-07-15
Payer: MEDICARE

## 2021-07-15 VITALS
BODY MASS INDEX: 23.98 KG/M2 | DIASTOLIC BLOOD PRESSURE: 63 MMHG | SYSTOLIC BLOOD PRESSURE: 132 MMHG | HEART RATE: 63 BPM | HEIGHT: 62 IN | WEIGHT: 130.31 LBS

## 2021-07-15 DIAGNOSIS — N39.46 MIXED STRESS AND URGE URINARY INCONTINENCE: ICD-10-CM

## 2021-07-15 DIAGNOSIS — N81.11 MIDLINE CYSTOCELE: ICD-10-CM

## 2021-07-15 DIAGNOSIS — N13.30 HYDRONEPHROSIS, UNSPECIFIED HYDRONEPHROSIS TYPE: Primary | ICD-10-CM

## 2021-07-15 DIAGNOSIS — N81.6 RECTOCELE: ICD-10-CM

## 2021-07-15 DIAGNOSIS — Z46.89 PESSARY MAINTENANCE: ICD-10-CM

## 2021-07-15 DIAGNOSIS — N95.2 VAGINAL ATROPHY: ICD-10-CM

## 2021-07-15 PROCEDURE — 99213 OFFICE O/P EST LOW 20 MIN: CPT | Mod: PBBFAC | Performed by: NURSE PRACTITIONER

## 2021-07-15 PROCEDURE — 99214 OFFICE O/P EST MOD 30 MIN: CPT | Mod: S$PBB,,, | Performed by: NURSE PRACTITIONER

## 2021-07-15 PROCEDURE — 99999 PR PBB SHADOW E&M-EST. PATIENT-LVL III: ICD-10-PCS | Mod: PBBFAC,,, | Performed by: NURSE PRACTITIONER

## 2021-07-15 PROCEDURE — 99214 PR OFFICE/OUTPT VISIT, EST, LEVL IV, 30-39 MIN: ICD-10-PCS | Mod: S$PBB,,, | Performed by: NURSE PRACTITIONER

## 2021-07-15 PROCEDURE — 99999 PR PBB SHADOW E&M-EST. PATIENT-LVL III: CPT | Mod: PBBFAC,,, | Performed by: NURSE PRACTITIONER

## 2021-07-15 RX ORDER — METHYLPREDNISOLONE 4 MG/1
TABLET ORAL
COMMUNITY
Start: 2021-07-12 | End: 2021-08-10

## 2021-07-20 ENCOUNTER — TELEPHONE (OUTPATIENT)
Dept: UROLOGY | Facility: CLINIC | Age: 76
End: 2021-07-20

## 2021-07-26 ENCOUNTER — TELEPHONE (OUTPATIENT)
Dept: UROGYNECOLOGY | Facility: CLINIC | Age: 76
End: 2021-07-26

## 2021-07-26 DIAGNOSIS — N39.0 RECURRENT UTI: Primary | ICD-10-CM

## 2021-07-27 ENCOUNTER — LAB VISIT (OUTPATIENT)
Dept: LAB | Facility: HOSPITAL | Age: 76
End: 2021-07-27
Attending: NURSE PRACTITIONER
Payer: MEDICARE

## 2021-07-27 DIAGNOSIS — N39.0 RECURRENT UTI: ICD-10-CM

## 2021-07-27 LAB
BILIRUB UR QL STRIP: NEGATIVE
CLARITY UR REFRACT.AUTO: CLEAR
COLOR UR AUTO: YELLOW
GLUCOSE UR QL STRIP: NEGATIVE
HGB UR QL STRIP: NEGATIVE
KETONES UR QL STRIP: NEGATIVE
LEUKOCYTE ESTERASE UR QL STRIP: NEGATIVE
NITRITE UR QL STRIP: NEGATIVE
PH UR STRIP: 7 [PH] (ref 5–8)
PROT UR QL STRIP: NEGATIVE
SP GR UR STRIP: 1.01 (ref 1–1.03)
URN SPEC COLLECT METH UR: NORMAL

## 2021-07-27 PROCEDURE — 87077 CULTURE AEROBIC IDENTIFY: CPT | Performed by: NURSE PRACTITIONER

## 2021-07-27 PROCEDURE — 87086 URINE CULTURE/COLONY COUNT: CPT | Performed by: NURSE PRACTITIONER

## 2021-07-27 PROCEDURE — 81003 URINALYSIS AUTO W/O SCOPE: CPT | Performed by: NURSE PRACTITIONER

## 2021-07-27 PROCEDURE — 87088 URINE BACTERIA CULTURE: CPT | Performed by: NURSE PRACTITIONER

## 2021-07-27 PROCEDURE — 87186 SC STD MICRODIL/AGAR DIL: CPT | Performed by: NURSE PRACTITIONER

## 2021-07-29 ENCOUNTER — PATIENT MESSAGE (OUTPATIENT)
Dept: UROGYNECOLOGY | Facility: CLINIC | Age: 76
End: 2021-07-29

## 2021-07-29 DIAGNOSIS — N30.00 ACUTE CYSTITIS WITHOUT HEMATURIA: Primary | ICD-10-CM

## 2021-07-29 LAB — BACTERIA UR CULT: ABNORMAL

## 2021-07-29 RX ORDER — CIPROFLOXACIN 250 MG/1
500 TABLET, FILM COATED ORAL 2 TIMES DAILY
Qty: 10 TABLET | Refills: 0 | Status: SHIPPED | OUTPATIENT
Start: 2021-07-29 | End: 2021-08-11

## 2021-07-30 ENCOUNTER — PATIENT MESSAGE (OUTPATIENT)
Dept: UROGYNECOLOGY | Facility: CLINIC | Age: 76
End: 2021-07-30

## 2021-08-03 ENCOUNTER — LAB VISIT (OUTPATIENT)
Dept: LAB | Facility: HOSPITAL | Age: 76
End: 2021-08-03
Attending: INTERNAL MEDICINE
Payer: MEDICARE

## 2021-08-03 DIAGNOSIS — R19.5 LOOSE STOOLS: ICD-10-CM

## 2021-08-03 LAB — OB PNL STL: NEGATIVE

## 2021-08-03 PROCEDURE — 87045 FECES CULTURE AEROBIC BACT: CPT | Performed by: INTERNAL MEDICINE

## 2021-08-03 PROCEDURE — 87324 CLOSTRIDIUM AG IA: CPT | Performed by: INTERNAL MEDICINE

## 2021-08-03 PROCEDURE — 87046 STOOL CULTR AEROBIC BACT EA: CPT | Performed by: INTERNAL MEDICINE

## 2021-08-03 PROCEDURE — 87427 SHIGA-LIKE TOXIN AG IA: CPT | Mod: 59 | Performed by: INTERNAL MEDICINE

## 2021-08-03 PROCEDURE — 87449 NOS EACH ORGANISM AG IA: CPT | Performed by: INTERNAL MEDICINE

## 2021-08-03 PROCEDURE — 87329 GIARDIA AG IA: CPT | Performed by: INTERNAL MEDICINE

## 2021-08-03 PROCEDURE — 82272 OCCULT BLD FECES 1-3 TESTS: CPT | Performed by: INTERNAL MEDICINE

## 2021-08-03 PROCEDURE — 89055 LEUKOCYTE ASSESSMENT FECAL: CPT | Performed by: INTERNAL MEDICINE

## 2021-08-04 LAB
C DIFF GDH STL QL: NEGATIVE
C DIFF TOX A+B STL QL IA: NEGATIVE
CRYPTOSP AG STL QL IA: NEGATIVE
G LAMBLIA AG STL QL IA: NEGATIVE
WBC #/AREA STL HPF: NORMAL /[HPF]

## 2021-08-05 LAB
E COLI SXT1 STL QL IA: NEGATIVE
E COLI SXT2 STL QL IA: NEGATIVE
O+P STL MICRO: NORMAL

## 2021-08-06 LAB
BACTERIA STL CULT: NORMAL
BACTERIA STL CULT: NORMAL

## 2021-08-10 ENCOUNTER — LAB VISIT (OUTPATIENT)
Dept: LAB | Facility: HOSPITAL | Age: 76
End: 2021-08-10
Payer: MEDICARE

## 2021-08-10 ENCOUNTER — OFFICE VISIT (OUTPATIENT)
Dept: NEUROLOGY | Facility: CLINIC | Age: 76
End: 2021-08-10
Payer: MEDICARE

## 2021-08-10 VITALS
HEIGHT: 62 IN | SYSTOLIC BLOOD PRESSURE: 91 MMHG | WEIGHT: 130.31 LBS | BODY MASS INDEX: 23.98 KG/M2 | DIASTOLIC BLOOD PRESSURE: 61 MMHG | HEART RATE: 73 BPM

## 2021-08-10 DIAGNOSIS — K59.00 CONSTIPATION, UNSPECIFIED CONSTIPATION TYPE: ICD-10-CM

## 2021-08-10 DIAGNOSIS — R41.3 MEMORY CHANGE: ICD-10-CM

## 2021-08-10 DIAGNOSIS — G20.C PARKINSONISM, UNSPECIFIED PARKINSONISM TYPE: Primary | ICD-10-CM

## 2021-08-10 DIAGNOSIS — R25.1 TREMOR: ICD-10-CM

## 2021-08-10 DIAGNOSIS — R26.81 GAIT INSTABILITY: ICD-10-CM

## 2021-08-10 DIAGNOSIS — Z71.89 COUNSELING REGARDING GOALS OF CARE: ICD-10-CM

## 2021-08-10 LAB — FOLATE SERPL-MCNC: 4.9 NG/ML (ref 4–24)

## 2021-08-10 PROCEDURE — 99215 PR OFFICE/OUTPT VISIT, EST, LEVL V, 40-54 MIN: ICD-10-PCS | Mod: S$PBB,,, | Performed by: PHYSICIAN ASSISTANT

## 2021-08-10 PROCEDURE — 99999 PR PBB SHADOW E&M-EST. PATIENT-LVL IV: CPT | Mod: PBBFAC,,, | Performed by: PHYSICIAN ASSISTANT

## 2021-08-10 PROCEDURE — 86592 SYPHILIS TEST NON-TREP QUAL: CPT | Performed by: PHYSICIAN ASSISTANT

## 2021-08-10 PROCEDURE — 99215 OFFICE O/P EST HI 40 MIN: CPT | Mod: S$PBB,,, | Performed by: PHYSICIAN ASSISTANT

## 2021-08-10 PROCEDURE — 36415 COLL VENOUS BLD VENIPUNCTURE: CPT | Performed by: PHYSICIAN ASSISTANT

## 2021-08-10 PROCEDURE — 84425 ASSAY OF VITAMIN B-1: CPT | Performed by: PHYSICIAN ASSISTANT

## 2021-08-10 PROCEDURE — 99999 PR PBB SHADOW E&M-EST. PATIENT-LVL IV: ICD-10-PCS | Mod: PBBFAC,,, | Performed by: PHYSICIAN ASSISTANT

## 2021-08-10 PROCEDURE — 82746 ASSAY OF FOLIC ACID SERUM: CPT | Performed by: PHYSICIAN ASSISTANT

## 2021-08-10 PROCEDURE — 84207 ASSAY OF VITAMIN B-6: CPT | Performed by: PHYSICIAN ASSISTANT

## 2021-08-10 PROCEDURE — 99214 OFFICE O/P EST MOD 30 MIN: CPT | Mod: PBBFAC | Performed by: PHYSICIAN ASSISTANT

## 2021-08-10 RX ORDER — CLOTRIMAZOLE AND BETAMETHASONE DIPROPIONATE 10; .64 MG/G; MG/G
CREAM TOPICAL 2 TIMES DAILY
Status: ON HOLD | COMMUNITY
End: 2022-02-10

## 2021-08-11 ENCOUNTER — OFFICE VISIT (OUTPATIENT)
Dept: UROLOGY | Facility: CLINIC | Age: 76
End: 2021-08-11
Payer: MEDICARE

## 2021-08-11 DIAGNOSIS — N13.30 HYDRONEPHROSIS, UNSPECIFIED HYDRONEPHROSIS TYPE: ICD-10-CM

## 2021-08-11 DIAGNOSIS — N39.0 RECURRENT UTI: ICD-10-CM

## 2021-08-11 DIAGNOSIS — R33.9 INCOMPLETE EMPTYING OF BLADDER: Primary | ICD-10-CM

## 2021-08-11 LAB — RPR SER QL: NORMAL

## 2021-08-11 PROCEDURE — 51701 INSERT BLADDER CATHETER: CPT | Mod: PBBFAC | Performed by: UROLOGY

## 2021-08-11 PROCEDURE — 99215 OFFICE O/P EST HI 40 MIN: CPT | Mod: S$PBB,25,, | Performed by: UROLOGY

## 2021-08-11 PROCEDURE — 51701 PR INSERTION OF NON-INDWELLING BLADDER CATHETERIZATION FOR RESIDUAL UR: ICD-10-PCS | Mod: S$PBB,,, | Performed by: UROLOGY

## 2021-08-11 PROCEDURE — 99999 PR PBB SHADOW E&M-EST. PATIENT-LVL V: CPT | Mod: PBBFAC,,, | Performed by: UROLOGY

## 2021-08-11 PROCEDURE — 81002 URINALYSIS NONAUTO W/O SCOPE: CPT | Mod: PBBFAC | Performed by: UROLOGY

## 2021-08-11 PROCEDURE — 51701 INSERT BLADDER CATHETER: CPT | Mod: S$PBB,,, | Performed by: UROLOGY

## 2021-08-11 PROCEDURE — 99215 OFFICE O/P EST HI 40 MIN: CPT | Mod: PBBFAC | Performed by: UROLOGY

## 2021-08-11 PROCEDURE — 99215 PR OFFICE/OUTPT VISIT, EST, LEVL V, 40-54 MIN: ICD-10-PCS | Mod: S$PBB,25,, | Performed by: UROLOGY

## 2021-08-11 PROCEDURE — 99999 PR PBB SHADOW E&M-EST. PATIENT-LVL V: ICD-10-PCS | Mod: PBBFAC,,, | Performed by: UROLOGY

## 2021-08-12 ENCOUNTER — TELEPHONE (OUTPATIENT)
Dept: UROLOGY | Facility: CLINIC | Age: 76
End: 2021-08-12

## 2021-08-12 DIAGNOSIS — N13.30 HYDRONEPHROSIS, UNSPECIFIED HYDRONEPHROSIS TYPE: Primary | ICD-10-CM

## 2021-08-12 DIAGNOSIS — R33.9 INCOMPLETE EMPTYING OF BLADDER: ICD-10-CM

## 2021-08-16 LAB
PYRIDOXAL SERPL-MCNC: 6 UG/L (ref 5–50)
VIT B1 BLD-MCNC: 42 UG/L (ref 38–122)

## 2021-08-18 ENCOUNTER — PATIENT MESSAGE (OUTPATIENT)
Dept: NEUROLOGY | Facility: CLINIC | Age: 76
End: 2021-08-18

## 2021-09-01 ENCOUNTER — PATIENT MESSAGE (OUTPATIENT)
Dept: SURGERY | Facility: HOSPITAL | Age: 76
End: 2021-09-01

## 2021-09-03 ENCOUNTER — PATIENT MESSAGE (OUTPATIENT)
Dept: NEUROLOGY | Facility: CLINIC | Age: 76
End: 2021-09-03

## 2021-09-13 ENCOUNTER — PATIENT MESSAGE (OUTPATIENT)
Dept: SURGERY | Facility: HOSPITAL | Age: 76
End: 2021-09-13

## 2021-09-17 ENCOUNTER — DOCUMENT SCAN (OUTPATIENT)
Dept: HOME HEALTH SERVICES | Facility: HOSPITAL | Age: 76
End: 2021-09-17

## 2021-10-04 ENCOUNTER — PATIENT MESSAGE (OUTPATIENT)
Dept: SURGERY | Facility: HOSPITAL | Age: 76
End: 2021-10-04

## 2021-10-07 ENCOUNTER — TELEPHONE (OUTPATIENT)
Dept: MEDSURG UNIT | Facility: HOSPITAL | Age: 76
End: 2021-10-07

## 2021-10-07 ENCOUNTER — OFFICE VISIT (OUTPATIENT)
Dept: UROGYNECOLOGY | Facility: CLINIC | Age: 76
End: 2021-10-07
Payer: MEDICARE

## 2021-10-07 ENCOUNTER — HOSPITAL ENCOUNTER (OUTPATIENT)
Dept: CARDIOLOGY | Facility: OTHER | Age: 76
Discharge: HOME | End: 2021-10-07
Attending: INTERNAL MEDICINE
Payer: MEDICARE

## 2021-10-07 ENCOUNTER — PATIENT MESSAGE (OUTPATIENT)
Dept: UROGYNECOLOGY | Facility: CLINIC | Age: 76
End: 2021-10-07

## 2021-10-07 VITALS
WEIGHT: 132.5 LBS | DIASTOLIC BLOOD PRESSURE: 60 MMHG | HEIGHT: 62 IN | SYSTOLIC BLOOD PRESSURE: 100 MMHG | BODY MASS INDEX: 24.38 KG/M2

## 2021-10-07 DIAGNOSIS — N81.6 RECTOCELE: ICD-10-CM

## 2021-10-07 DIAGNOSIS — N13.30 HYDRONEPHROSIS, UNSPECIFIED HYDRONEPHROSIS TYPE: ICD-10-CM

## 2021-10-07 DIAGNOSIS — N39.46 MIXED STRESS AND URGE URINARY INCONTINENCE: ICD-10-CM

## 2021-10-07 DIAGNOSIS — R07.89 CHEST PAIN, ATYPICAL: ICD-10-CM

## 2021-10-07 DIAGNOSIS — N39.0 RECURRENT UTI: Primary | ICD-10-CM

## 2021-10-07 DIAGNOSIS — S30.814A ABRASION OF VAGINA, INITIAL ENCOUNTER: ICD-10-CM

## 2021-10-07 DIAGNOSIS — N95.2 VAGINAL ATROPHY: ICD-10-CM

## 2021-10-07 DIAGNOSIS — N81.11 MIDLINE CYSTOCELE: ICD-10-CM

## 2021-10-07 PROCEDURE — 99214 PR OFFICE/OUTPT VISIT, EST, LEVL IV, 30-39 MIN: ICD-10-PCS | Mod: S$PBB,,, | Performed by: NURSE PRACTITIONER

## 2021-10-07 PROCEDURE — 87086 URINE CULTURE/COLONY COUNT: CPT | Performed by: NURSE PRACTITIONER

## 2021-10-07 PROCEDURE — 93010 ELECTROCARDIOGRAM REPORT: CPT | Mod: ,,, | Performed by: INTERNAL MEDICINE

## 2021-10-07 PROCEDURE — 99215 OFFICE O/P EST HI 40 MIN: CPT | Mod: PBBFAC | Performed by: NURSE PRACTITIONER

## 2021-10-07 PROCEDURE — 93010 EKG 12-LEAD: ICD-10-PCS | Mod: ,,, | Performed by: INTERNAL MEDICINE

## 2021-10-07 PROCEDURE — 99999 PR PBB SHADOW E&M-EST. PATIENT-LVL V: CPT | Mod: PBBFAC,,, | Performed by: NURSE PRACTITIONER

## 2021-10-07 PROCEDURE — 99999 PR PBB SHADOW E&M-EST. PATIENT-LVL V: ICD-10-PCS | Mod: PBBFAC,,, | Performed by: NURSE PRACTITIONER

## 2021-10-07 PROCEDURE — 99214 OFFICE O/P EST MOD 30 MIN: CPT | Mod: S$PBB,,, | Performed by: NURSE PRACTITIONER

## 2021-10-07 PROCEDURE — 93005 ELECTROCARDIOGRAM TRACING: CPT

## 2021-10-07 RX ORDER — GINSENG 100 MG
1 CAPSULE ORAL DAILY
COMMUNITY
End: 2022-02-02

## 2021-10-08 ENCOUNTER — OFFICE VISIT (OUTPATIENT)
Dept: CARDIOLOGY | Facility: CLINIC | Age: 76
End: 2021-10-08
Payer: MEDICARE

## 2021-10-08 ENCOUNTER — TELEPHONE (OUTPATIENT)
Dept: UROLOGY | Facility: CLINIC | Age: 76
End: 2021-10-08

## 2021-10-08 VITALS
BODY MASS INDEX: 24.42 KG/M2 | SYSTOLIC BLOOD PRESSURE: 124 MMHG | DIASTOLIC BLOOD PRESSURE: 59 MMHG | HEART RATE: 71 BPM | WEIGHT: 132.69 LBS | HEIGHT: 62 IN

## 2021-10-08 DIAGNOSIS — R33.9 INCOMPLETE EMPTYING OF BLADDER: ICD-10-CM

## 2021-10-08 DIAGNOSIS — R07.89 OTHER CHEST PAIN: ICD-10-CM

## 2021-10-08 DIAGNOSIS — N39.0 RECURRENT UTI: Primary | ICD-10-CM

## 2021-10-08 PROCEDURE — 99203 PR OFFICE/OUTPT VISIT, NEW, LEVL III, 30-44 MIN: ICD-10-PCS | Mod: S$PBB,,, | Performed by: INTERNAL MEDICINE

## 2021-10-08 PROCEDURE — 99203 OFFICE O/P NEW LOW 30 MIN: CPT | Mod: S$PBB,,, | Performed by: INTERNAL MEDICINE

## 2021-10-08 PROCEDURE — 99999 PR PBB SHADOW E&M-EST. PATIENT-LVL V: ICD-10-PCS | Mod: PBBFAC,,, | Performed by: INTERNAL MEDICINE

## 2021-10-08 PROCEDURE — 99999 PR PBB SHADOW E&M-EST. PATIENT-LVL V: CPT | Mod: PBBFAC,,, | Performed by: INTERNAL MEDICINE

## 2021-10-08 PROCEDURE — 99215 OFFICE O/P EST HI 40 MIN: CPT | Mod: PBBFAC,PO | Performed by: INTERNAL MEDICINE

## 2021-10-09 ENCOUNTER — PATIENT MESSAGE (OUTPATIENT)
Dept: UROGYNECOLOGY | Facility: CLINIC | Age: 76
End: 2021-10-09

## 2021-10-09 LAB — BACTERIA UR CULT: NO GROWTH

## 2021-10-15 ENCOUNTER — OFFICE VISIT (OUTPATIENT)
Dept: GASTROENTEROLOGY | Facility: CLINIC | Age: 76
DRG: 390 | End: 2021-10-15
Payer: MEDICARE

## 2021-10-15 ENCOUNTER — PATIENT MESSAGE (OUTPATIENT)
Dept: HEMATOLOGY/ONCOLOGY | Facility: CLINIC | Age: 76
End: 2021-10-15

## 2021-10-15 ENCOUNTER — PATIENT MESSAGE (OUTPATIENT)
Dept: SURGERY | Facility: HOSPITAL | Age: 76
End: 2021-10-15

## 2021-10-15 ENCOUNTER — PATIENT MESSAGE (OUTPATIENT)
Dept: GASTROENTEROLOGY | Facility: CLINIC | Age: 76
End: 2021-10-15

## 2021-10-15 ENCOUNTER — HOSPITAL ENCOUNTER (INPATIENT)
Facility: HOSPITAL | Age: 76
LOS: 2 days | Discharge: HOME | DRG: 390 | End: 2021-10-17
Attending: EMERGENCY MEDICINE | Admitting: STUDENT IN AN ORGANIZED HEALTH CARE EDUCATION/TRAINING PROGRAM
Payer: MEDICARE

## 2021-10-15 ENCOUNTER — TELEPHONE (OUTPATIENT)
Dept: GASTROENTEROLOGY | Facility: CLINIC | Age: 76
End: 2021-10-15

## 2021-10-15 VITALS
BODY MASS INDEX: 23.85 KG/M2 | SYSTOLIC BLOOD PRESSURE: 116 MMHG | DIASTOLIC BLOOD PRESSURE: 67 MMHG | HEART RATE: 76 BPM | HEIGHT: 62 IN | WEIGHT: 129.63 LBS

## 2021-10-15 DIAGNOSIS — K58.2 IRRITABLE BOWEL SYNDROME WITH BOTH CONSTIPATION AND DIARRHEA: ICD-10-CM

## 2021-10-15 DIAGNOSIS — K56.600 PARTIAL SMALL BOWEL OBSTRUCTION: ICD-10-CM

## 2021-10-15 DIAGNOSIS — Z86.010 HISTORY OF ADENOMATOUS POLYP OF COLON: ICD-10-CM

## 2021-10-15 DIAGNOSIS — D69.6 THROMBOCYTOPENIA: ICD-10-CM

## 2021-10-15 DIAGNOSIS — K83.8 COMMON BILE DUCT DILATION: ICD-10-CM

## 2021-10-15 DIAGNOSIS — R10.13 EPIGASTRIC PAIN: ICD-10-CM

## 2021-10-15 DIAGNOSIS — K86.89 ATROPHIC PANCREAS: Primary | ICD-10-CM

## 2021-10-15 DIAGNOSIS — Z90.49 HISTORY OF PARTIAL COLECTOMY: ICD-10-CM

## 2021-10-15 DIAGNOSIS — E83.52 HYPERCALCEMIA: ICD-10-CM

## 2021-10-15 DIAGNOSIS — Z87.19 HISTORY OF CONSTIPATION: ICD-10-CM

## 2021-10-15 DIAGNOSIS — Z46.59 ENCOUNTER FOR NASOGASTRIC (NG) TUBE PLACEMENT: ICD-10-CM

## 2021-10-15 DIAGNOSIS — K66.0 ABDOMINAL ADHESIONS: ICD-10-CM

## 2021-10-15 DIAGNOSIS — Z90.49 STATUS POST CHOLECYSTECTOMY: ICD-10-CM

## 2021-10-15 DIAGNOSIS — R93.3 ABNORMAL FINDINGS ON DIAGNOSTIC IMAGING OF OTHER PARTS OF DIGESTIVE TRACT: ICD-10-CM

## 2021-10-15 DIAGNOSIS — Z79.899 LONG-TERM CURRENT USE OF PROTON PUMP INHIBITOR THERAPY: ICD-10-CM

## 2021-10-15 DIAGNOSIS — R10.33 PERIUMBILICAL ABDOMINAL PAIN: Primary | ICD-10-CM

## 2021-10-15 DIAGNOSIS — D46.9 MYELODYSPLASTIC SYNDROME: ICD-10-CM

## 2021-10-15 DIAGNOSIS — R94.8 ABNORMAL PANCREAS FUNCTION TEST: ICD-10-CM

## 2021-10-15 LAB
BILIRUB UR QL STRIP: NEGATIVE
CLARITY UR REFRACT.AUTO: CLEAR
COLOR UR AUTO: NORMAL
CTP QC/QA: YES
ESTIMATED AVG GLUCOSE: 146 MG/DL (ref 68–131)
GLUCOSE UR QL STRIP: NEGATIVE
HBA1C MFR BLD: 6.7 % (ref 4–5.6)
HGB UR QL STRIP: NEGATIVE
KETONES UR QL STRIP: NEGATIVE
LACTATE SERPL-SCNC: 2.2 MMOL/L (ref 0.5–2.2)
LEUKOCYTE ESTERASE UR QL STRIP: NEGATIVE
NITRITE UR QL STRIP: NEGATIVE
PH UR STRIP: 5 [PH] (ref 5–8)
PROT UR QL STRIP: NEGATIVE
SARS-COV-2 RDRP RESP QL NAA+PROBE: NEGATIVE
SP GR UR STRIP: 1.01 (ref 1–1.03)
URN SPEC COLLECT METH UR: NORMAL

## 2021-10-15 PROCEDURE — 96374 THER/PROPH/DIAG INJ IV PUSH: CPT

## 2021-10-15 PROCEDURE — 99214 OFFICE O/P EST MOD 30 MIN: CPT | Mod: S$PBB,,, | Performed by: INTERNAL MEDICINE

## 2021-10-15 PROCEDURE — 99214 PR OFFICE/OUTPT VISIT, EST, LEVL IV, 30-39 MIN: ICD-10-PCS | Mod: S$PBB,,, | Performed by: INTERNAL MEDICINE

## 2021-10-15 PROCEDURE — 96376 TX/PRO/DX INJ SAME DRUG ADON: CPT

## 2021-10-15 PROCEDURE — 25500020 PHARM REV CODE 255: Performed by: EMERGENCY MEDICINE

## 2021-10-15 PROCEDURE — 25000003 PHARM REV CODE 250: Performed by: STUDENT IN AN ORGANIZED HEALTH CARE EDUCATION/TRAINING PROGRAM

## 2021-10-15 PROCEDURE — 99223 PR INITIAL HOSPITAL CARE,LEVL III: ICD-10-PCS | Mod: AI,,, | Performed by: STUDENT IN AN ORGANIZED HEALTH CARE EDUCATION/TRAINING PROGRAM

## 2021-10-15 PROCEDURE — 99285 EMERGENCY DEPT VISIT HI MDM: CPT | Mod: GC,CS,, | Performed by: EMERGENCY MEDICINE

## 2021-10-15 PROCEDURE — 11000001 HC ACUTE MED/SURG PRIVATE ROOM

## 2021-10-15 PROCEDURE — 99215 OFFICE O/P EST HI 40 MIN: CPT | Mod: PBBFAC,25 | Performed by: INTERNAL MEDICINE

## 2021-10-15 PROCEDURE — U0002 COVID-19 LAB TEST NON-CDC: HCPCS | Performed by: STUDENT IN AN ORGANIZED HEALTH CARE EDUCATION/TRAINING PROGRAM

## 2021-10-15 PROCEDURE — 99999 PR PBB SHADOW E&M-EST. PATIENT-LVL V: ICD-10-PCS | Mod: PBBFAC,,, | Performed by: INTERNAL MEDICINE

## 2021-10-15 PROCEDURE — 81003 URINALYSIS AUTO W/O SCOPE: CPT

## 2021-10-15 PROCEDURE — 96361 HYDRATE IV INFUSION ADD-ON: CPT

## 2021-10-15 PROCEDURE — 83036 HEMOGLOBIN GLYCOSYLATED A1C: CPT | Performed by: EMERGENCY MEDICINE

## 2021-10-15 PROCEDURE — 99223 1ST HOSP IP/OBS HIGH 75: CPT | Mod: AI,,, | Performed by: STUDENT IN AN ORGANIZED HEALTH CARE EDUCATION/TRAINING PROGRAM

## 2021-10-15 PROCEDURE — 86803 HEPATITIS C AB TEST: CPT | Performed by: EMERGENCY MEDICINE

## 2021-10-15 PROCEDURE — 63600175 PHARM REV CODE 636 W HCPCS: Performed by: STUDENT IN AN ORGANIZED HEALTH CARE EDUCATION/TRAINING PROGRAM

## 2021-10-15 PROCEDURE — 83605 ASSAY OF LACTIC ACID: CPT

## 2021-10-15 PROCEDURE — 99999 PR PBB SHADOW E&M-EST. PATIENT-LVL V: CPT | Mod: PBBFAC,,, | Performed by: INTERNAL MEDICINE

## 2021-10-15 PROCEDURE — 63600175 PHARM REV CODE 636 W HCPCS

## 2021-10-15 PROCEDURE — 99285 PR EMERGENCY DEPT VISIT,LEVEL V: ICD-10-PCS | Mod: GC,CS,, | Performed by: EMERGENCY MEDICINE

## 2021-10-15 PROCEDURE — 96375 TX/PRO/DX INJ NEW DRUG ADDON: CPT

## 2021-10-15 PROCEDURE — A4216 STERILE WATER/SALINE, 10 ML: HCPCS | Performed by: STUDENT IN AN ORGANIZED HEALTH CARE EDUCATION/TRAINING PROGRAM

## 2021-10-15 PROCEDURE — 99285 EMERGENCY DEPT VISIT HI MDM: CPT | Mod: 25,27

## 2021-10-15 RX ORDER — SODIUM CHLORIDE AND POTASSIUM CHLORIDE 150; 900 MG/100ML; MG/100ML
INJECTION, SOLUTION INTRAVENOUS CONTINUOUS
Status: DISCONTINUED | OUTPATIENT
Start: 2021-10-15 | End: 2021-10-17 | Stop reason: HOSPADM

## 2021-10-15 RX ORDER — ONDANSETRON 2 MG/ML
4 INJECTION INTRAMUSCULAR; INTRAVENOUS
Status: COMPLETED | OUTPATIENT
Start: 2021-10-15 | End: 2021-10-15

## 2021-10-15 RX ORDER — MORPHINE SULFATE 2 MG/ML
2 INJECTION, SOLUTION INTRAMUSCULAR; INTRAVENOUS EVERY 4 HOURS PRN
Status: DISCONTINUED | OUTPATIENT
Start: 2021-10-15 | End: 2021-10-17 | Stop reason: HOSPADM

## 2021-10-15 RX ORDER — MORPHINE SULFATE 4 MG/ML
4 INJECTION, SOLUTION INTRAMUSCULAR; INTRAVENOUS
Status: COMPLETED | OUTPATIENT
Start: 2021-10-15 | End: 2021-10-15

## 2021-10-15 RX ORDER — ONDANSETRON 2 MG/ML
4 INJECTION INTRAMUSCULAR; INTRAVENOUS EVERY 6 HOURS PRN
Status: DISCONTINUED | OUTPATIENT
Start: 2021-10-15 | End: 2021-10-17 | Stop reason: HOSPADM

## 2021-10-15 RX ORDER — FAMOTIDINE 10 MG/ML
20 INJECTION INTRAVENOUS DAILY
Status: DISCONTINUED | OUTPATIENT
Start: 2021-10-16 | End: 2021-10-17 | Stop reason: HOSPADM

## 2021-10-15 RX ORDER — INSULIN ASPART 100 [IU]/ML
0-5 INJECTION, SOLUTION INTRAVENOUS; SUBCUTANEOUS EVERY 6 HOURS PRN
Status: DISCONTINUED | OUTPATIENT
Start: 2021-10-15 | End: 2021-10-17 | Stop reason: HOSPADM

## 2021-10-15 RX ORDER — MORPHINE SULFATE 2 MG/ML
1 INJECTION, SOLUTION INTRAMUSCULAR; INTRAVENOUS
Status: DISCONTINUED | OUTPATIENT
Start: 2021-10-15 | End: 2021-10-17 | Stop reason: HOSPADM

## 2021-10-15 RX ORDER — ACETAMINOPHEN 10 MG/ML
1000 INJECTION, SOLUTION INTRAVENOUS EVERY 8 HOURS
Status: COMPLETED | OUTPATIENT
Start: 2021-10-15 | End: 2021-10-16

## 2021-10-15 RX ORDER — GLUCAGON 1 MG
1 KIT INJECTION
Status: DISCONTINUED | OUTPATIENT
Start: 2021-10-15 | End: 2021-10-17 | Stop reason: HOSPADM

## 2021-10-15 RX ORDER — SODIUM CHLORIDE 0.9 % (FLUSH) 0.9 %
3 SYRINGE (ML) INJECTION EVERY 8 HOURS
Status: DISCONTINUED | OUTPATIENT
Start: 2021-10-15 | End: 2021-10-17 | Stop reason: HOSPADM

## 2021-10-15 RX ADMIN — SODIUM CHLORIDE, SODIUM LACTATE, POTASSIUM CHLORIDE, AND CALCIUM CHLORIDE 1000 ML: .6; .31; .03; .02 INJECTION, SOLUTION INTRAVENOUS at 04:10

## 2021-10-15 RX ADMIN — ONDANSETRON 4 MG: 2 INJECTION INTRAMUSCULAR; INTRAVENOUS at 04:10

## 2021-10-15 RX ADMIN — MORPHINE SULFATE 4 MG: 4 INJECTION INTRAVENOUS at 04:10

## 2021-10-15 RX ADMIN — MORPHINE SULFATE 4 MG: 4 INJECTION INTRAVENOUS at 06:10

## 2021-10-15 RX ADMIN — IOHEXOL 75 ML: 350 INJECTION, SOLUTION INTRAVENOUS at 05:10

## 2021-10-15 RX ADMIN — SODIUM CHLORIDE 3 ML: 9 INJECTION, SOLUTION INTRAMUSCULAR; INTRAVENOUS; SUBCUTANEOUS at 10:10

## 2021-10-15 RX ADMIN — SODIUM CHLORIDE AND POTASSIUM CHLORIDE: .9; .15 SOLUTION INTRAVENOUS at 09:10

## 2021-10-15 RX ADMIN — MORPHINE SULFATE 1 MG: 2 INJECTION, SOLUTION INTRAMUSCULAR; INTRAVENOUS at 09:10

## 2021-10-16 LAB
ALBUMIN SERPL BCP-MCNC: 3.4 G/DL (ref 3.5–5.2)
ALP SERPL-CCNC: 69 U/L (ref 55–135)
ALT SERPL W/O P-5'-P-CCNC: 61 U/L (ref 10–44)
ANION GAP SERPL CALC-SCNC: 16 MMOL/L (ref 8–16)
AST SERPL-CCNC: 77 U/L (ref 10–40)
BASOPHILS # BLD AUTO: 0.04 K/UL (ref 0–0.2)
BASOPHILS NFR BLD: 0.6 % (ref 0–1.9)
BILIRUB SERPL-MCNC: 1.5 MG/DL (ref 0.1–1)
BUN SERPL-MCNC: 8 MG/DL (ref 8–23)
CALCIUM SERPL-MCNC: 8.9 MG/DL (ref 8.7–10.5)
CHLORIDE SERPL-SCNC: 106 MMOL/L (ref 95–110)
CO2 SERPL-SCNC: 18 MMOL/L (ref 23–29)
CREAT SERPL-MCNC: 0.7 MG/DL (ref 0.5–1.4)
DIFFERENTIAL METHOD: ABNORMAL
EOSINOPHIL # BLD AUTO: 0.2 K/UL (ref 0–0.5)
EOSINOPHIL NFR BLD: 2.5 % (ref 0–8)
ERYTHROCYTE [DISTWIDTH] IN BLOOD BY AUTOMATED COUNT: 13.5 % (ref 11.5–14.5)
EST. GFR  (AFRICAN AMERICAN): >60 ML/MIN/1.73 M^2
EST. GFR  (NON AFRICAN AMERICAN): >60 ML/MIN/1.73 M^2
ESTIMATED AVG GLUCOSE: 146 MG/DL (ref 68–131)
GLUCOSE SERPL-MCNC: 150 MG/DL (ref 70–110)
HBA1C MFR BLD: 6.7 % (ref 4–5.6)
HCT VFR BLD AUTO: 33 % (ref 37–48.5)
HGB BLD-MCNC: 10.8 G/DL (ref 12–16)
IMM GRANULOCYTES # BLD AUTO: 0.02 K/UL (ref 0–0.04)
IMM GRANULOCYTES NFR BLD AUTO: 0.3 % (ref 0–0.5)
LYMPHOCYTES # BLD AUTO: 1 K/UL (ref 1–4.8)
LYMPHOCYTES NFR BLD: 13.7 % (ref 18–48)
MAGNESIUM SERPL-MCNC: 0.9 MG/DL (ref 1.6–2.6)
MCH RBC QN AUTO: 32.3 PG (ref 27–31)
MCHC RBC AUTO-ENTMCNC: 32.7 G/DL (ref 32–36)
MCV RBC AUTO: 99 FL (ref 82–98)
MONOCYTES # BLD AUTO: 0.7 K/UL (ref 0.3–1)
MONOCYTES NFR BLD: 9.4 % (ref 4–15)
NEUTROPHILS # BLD AUTO: 5.1 K/UL (ref 1.8–7.7)
NEUTROPHILS NFR BLD: 73.5 % (ref 38–73)
NRBC BLD-RTO: 0 /100 WBC
PHOSPHATE SERPL-MCNC: 3.9 MG/DL (ref 2.7–4.5)
PLATELET # BLD AUTO: 62 K/UL (ref 150–450)
PMV BLD AUTO: 10.9 FL (ref 9.2–12.9)
POCT GLUCOSE: 119 MG/DL (ref 70–110)
POTASSIUM SERPL-SCNC: 4.1 MMOL/L (ref 3.5–5.1)
PROT SERPL-MCNC: 5.9 G/DL (ref 6–8.4)
RBC # BLD AUTO: 3.34 M/UL (ref 4–5.4)
SODIUM SERPL-SCNC: 140 MMOL/L (ref 136–145)
WBC # BLD AUTO: 6.93 K/UL (ref 3.9–12.7)

## 2021-10-16 PROCEDURE — 25500020 PHARM REV CODE 255: Performed by: STUDENT IN AN ORGANIZED HEALTH CARE EDUCATION/TRAINING PROGRAM

## 2021-10-16 PROCEDURE — 36415 COLL VENOUS BLD VENIPUNCTURE: CPT | Performed by: STUDENT IN AN ORGANIZED HEALTH CARE EDUCATION/TRAINING PROGRAM

## 2021-10-16 PROCEDURE — 83735 ASSAY OF MAGNESIUM: CPT | Performed by: STUDENT IN AN ORGANIZED HEALTH CARE EDUCATION/TRAINING PROGRAM

## 2021-10-16 PROCEDURE — 63600175 PHARM REV CODE 636 W HCPCS: Performed by: STUDENT IN AN ORGANIZED HEALTH CARE EDUCATION/TRAINING PROGRAM

## 2021-10-16 PROCEDURE — 85025 COMPLETE CBC W/AUTO DIFF WBC: CPT | Performed by: STUDENT IN AN ORGANIZED HEALTH CARE EDUCATION/TRAINING PROGRAM

## 2021-10-16 PROCEDURE — 84100 ASSAY OF PHOSPHORUS: CPT | Performed by: STUDENT IN AN ORGANIZED HEALTH CARE EDUCATION/TRAINING PROGRAM

## 2021-10-16 PROCEDURE — 99233 PR SUBSEQUENT HOSPITAL CARE,LEVL III: ICD-10-PCS | Mod: ,,, | Performed by: STUDENT IN AN ORGANIZED HEALTH CARE EDUCATION/TRAINING PROGRAM

## 2021-10-16 PROCEDURE — 99233 SBSQ HOSP IP/OBS HIGH 50: CPT | Mod: ,,, | Performed by: STUDENT IN AN ORGANIZED HEALTH CARE EDUCATION/TRAINING PROGRAM

## 2021-10-16 PROCEDURE — 80053 COMPREHEN METABOLIC PANEL: CPT | Performed by: STUDENT IN AN ORGANIZED HEALTH CARE EDUCATION/TRAINING PROGRAM

## 2021-10-16 PROCEDURE — A4216 STERILE WATER/SALINE, 10 ML: HCPCS | Performed by: STUDENT IN AN ORGANIZED HEALTH CARE EDUCATION/TRAINING PROGRAM

## 2021-10-16 PROCEDURE — 25000003 PHARM REV CODE 250: Performed by: STUDENT IN AN ORGANIZED HEALTH CARE EDUCATION/TRAINING PROGRAM

## 2021-10-16 PROCEDURE — 83036 HEMOGLOBIN GLYCOSYLATED A1C: CPT | Performed by: STUDENT IN AN ORGANIZED HEALTH CARE EDUCATION/TRAINING PROGRAM

## 2021-10-16 PROCEDURE — 11000001 HC ACUTE MED/SURG PRIVATE ROOM

## 2021-10-16 RX ORDER — MAGNESIUM SULFATE HEPTAHYDRATE 40 MG/ML
2 INJECTION, SOLUTION INTRAVENOUS ONCE
Status: COMPLETED | OUTPATIENT
Start: 2021-10-16 | End: 2021-10-16

## 2021-10-16 RX ADMIN — FAMOTIDINE 20 MG: 10 INJECTION INTRAVENOUS at 10:10

## 2021-10-16 RX ADMIN — SODIUM CHLORIDE 3 ML: 9 INJECTION, SOLUTION INTRAMUSCULAR; INTRAVENOUS; SUBCUTANEOUS at 06:10

## 2021-10-16 RX ADMIN — ONDANSETRON 4 MG: 2 INJECTION INTRAMUSCULAR; INTRAVENOUS at 04:10

## 2021-10-16 RX ADMIN — ACETAMINOPHEN 1000 MG: 10 INJECTION, SOLUTION INTRAVENOUS at 06:10

## 2021-10-16 RX ADMIN — DIATRIZOATE MEGLUMINE AND DIATRIZOATE SODIUM 50 ML: 660; 100 LIQUID ORAL; RECTAL at 12:10

## 2021-10-16 RX ADMIN — ACETAMINOPHEN 1000 MG: 10 INJECTION, SOLUTION INTRAVENOUS at 01:10

## 2021-10-16 RX ADMIN — SODIUM CHLORIDE AND POTASSIUM CHLORIDE: .9; .15 SOLUTION INTRAVENOUS at 11:10

## 2021-10-16 RX ADMIN — MORPHINE SULFATE 2 MG: 2 INJECTION, SOLUTION INTRAMUSCULAR; INTRAVENOUS at 03:10

## 2021-10-16 RX ADMIN — SODIUM CHLORIDE AND POTASSIUM CHLORIDE: .9; .15 SOLUTION INTRAVENOUS at 12:10

## 2021-10-16 RX ADMIN — MAGNESIUM SULFATE 2 G: 2 INJECTION INTRAVENOUS at 08:10

## 2021-10-16 RX ADMIN — MORPHINE SULFATE 2 MG: 2 INJECTION, SOLUTION INTRAMUSCULAR; INTRAVENOUS at 11:10

## 2021-10-16 RX ADMIN — SODIUM CHLORIDE 3 ML: 9 INJECTION, SOLUTION INTRAMUSCULAR; INTRAVENOUS; SUBCUTANEOUS at 10:10

## 2021-10-17 VITALS
SYSTOLIC BLOOD PRESSURE: 105 MMHG | TEMPERATURE: 99 F | RESPIRATION RATE: 18 BRPM | DIASTOLIC BLOOD PRESSURE: 56 MMHG | HEIGHT: 62 IN | WEIGHT: 129 LBS | HEART RATE: 65 BPM | OXYGEN SATURATION: 98 % | BODY MASS INDEX: 23.74 KG/M2

## 2021-10-17 LAB
ALBUMIN SERPL BCP-MCNC: 3 G/DL (ref 3.5–5.2)
ALP SERPL-CCNC: 66 U/L (ref 55–135)
ALT SERPL W/O P-5'-P-CCNC: 37 U/L (ref 10–44)
ANION GAP SERPL CALC-SCNC: 8 MMOL/L (ref 8–16)
AST SERPL-CCNC: 32 U/L (ref 10–40)
BASOPHILS # BLD AUTO: 0.02 K/UL (ref 0–0.2)
BASOPHILS NFR BLD: 0.6 % (ref 0–1.9)
BILIRUB SERPL-MCNC: 1.5 MG/DL (ref 0.1–1)
BUN SERPL-MCNC: 5 MG/DL (ref 8–23)
CALCIUM SERPL-MCNC: 8.2 MG/DL (ref 8.7–10.5)
CHLORIDE SERPL-SCNC: 110 MMOL/L (ref 95–110)
CO2 SERPL-SCNC: 23 MMOL/L (ref 23–29)
CREAT SERPL-MCNC: 0.6 MG/DL (ref 0.5–1.4)
DIFFERENTIAL METHOD: ABNORMAL
EOSINOPHIL # BLD AUTO: 0.2 K/UL (ref 0–0.5)
EOSINOPHIL NFR BLD: 6.3 % (ref 0–8)
ERYTHROCYTE [DISTWIDTH] IN BLOOD BY AUTOMATED COUNT: 13.4 % (ref 11.5–14.5)
EST. GFR  (AFRICAN AMERICAN): >60 ML/MIN/1.73 M^2
EST. GFR  (NON AFRICAN AMERICAN): >60 ML/MIN/1.73 M^2
GLUCOSE SERPL-MCNC: 110 MG/DL (ref 70–110)
HCT VFR BLD AUTO: 32 % (ref 37–48.5)
HGB BLD-MCNC: 10 G/DL (ref 12–16)
IMM GRANULOCYTES # BLD AUTO: 0.01 K/UL (ref 0–0.04)
IMM GRANULOCYTES NFR BLD AUTO: 0.3 % (ref 0–0.5)
LYMPHOCYTES # BLD AUTO: 1.1 K/UL (ref 1–4.8)
LYMPHOCYTES NFR BLD: 31.1 % (ref 18–48)
MAGNESIUM SERPL-MCNC: 1.4 MG/DL (ref 1.6–2.6)
MCH RBC QN AUTO: 31.9 PG (ref 27–31)
MCHC RBC AUTO-ENTMCNC: 31.3 G/DL (ref 32–36)
MCV RBC AUTO: 102 FL (ref 82–98)
MONOCYTES # BLD AUTO: 0.4 K/UL (ref 0.3–1)
MONOCYTES NFR BLD: 10.5 % (ref 4–15)
NEUTROPHILS # BLD AUTO: 1.9 K/UL (ref 1.8–7.7)
NEUTROPHILS NFR BLD: 51.2 % (ref 38–73)
NRBC BLD-RTO: 0 /100 WBC
PHOSPHATE SERPL-MCNC: 3 MG/DL (ref 2.7–4.5)
PLATELET # BLD AUTO: 70 K/UL (ref 150–450)
PMV BLD AUTO: 11.5 FL (ref 9.2–12.9)
POCT GLUCOSE: 125 MG/DL (ref 70–110)
POTASSIUM SERPL-SCNC: 4.2 MMOL/L (ref 3.5–5.1)
PROT SERPL-MCNC: 5.4 G/DL (ref 6–8.4)
RBC # BLD AUTO: 3.13 M/UL (ref 4–5.4)
SODIUM SERPL-SCNC: 141 MMOL/L (ref 136–145)
WBC # BLD AUTO: 3.63 K/UL (ref 3.9–12.7)

## 2021-10-17 PROCEDURE — 83735 ASSAY OF MAGNESIUM: CPT | Performed by: STUDENT IN AN ORGANIZED HEALTH CARE EDUCATION/TRAINING PROGRAM

## 2021-10-17 PROCEDURE — 80053 COMPREHEN METABOLIC PANEL: CPT | Performed by: STUDENT IN AN ORGANIZED HEALTH CARE EDUCATION/TRAINING PROGRAM

## 2021-10-17 PROCEDURE — 99238 PR HOSPITAL DISCHARGE DAY,<30 MIN: ICD-10-PCS | Mod: ,,, | Performed by: STUDENT IN AN ORGANIZED HEALTH CARE EDUCATION/TRAINING PROGRAM

## 2021-10-17 PROCEDURE — 85025 COMPLETE CBC W/AUTO DIFF WBC: CPT | Performed by: STUDENT IN AN ORGANIZED HEALTH CARE EDUCATION/TRAINING PROGRAM

## 2021-10-17 PROCEDURE — A4216 STERILE WATER/SALINE, 10 ML: HCPCS | Performed by: STUDENT IN AN ORGANIZED HEALTH CARE EDUCATION/TRAINING PROGRAM

## 2021-10-17 PROCEDURE — 63600175 PHARM REV CODE 636 W HCPCS: Performed by: STUDENT IN AN ORGANIZED HEALTH CARE EDUCATION/TRAINING PROGRAM

## 2021-10-17 PROCEDURE — 36415 COLL VENOUS BLD VENIPUNCTURE: CPT | Performed by: STUDENT IN AN ORGANIZED HEALTH CARE EDUCATION/TRAINING PROGRAM

## 2021-10-17 PROCEDURE — 25000003 PHARM REV CODE 250: Performed by: STUDENT IN AN ORGANIZED HEALTH CARE EDUCATION/TRAINING PROGRAM

## 2021-10-17 PROCEDURE — 84100 ASSAY OF PHOSPHORUS: CPT | Performed by: STUDENT IN AN ORGANIZED HEALTH CARE EDUCATION/TRAINING PROGRAM

## 2021-10-17 PROCEDURE — 99238 HOSP IP/OBS DSCHRG MGMT 30/<: CPT | Mod: ,,, | Performed by: STUDENT IN AN ORGANIZED HEALTH CARE EDUCATION/TRAINING PROGRAM

## 2021-10-17 RX ORDER — ENOXAPARIN SODIUM 100 MG/ML
40 INJECTION SUBCUTANEOUS EVERY 24 HOURS
Status: DISCONTINUED | OUTPATIENT
Start: 2021-10-17 | End: 2021-10-17

## 2021-10-17 RX ORDER — MAGNESIUM SULFATE HEPTAHYDRATE 40 MG/ML
2 INJECTION, SOLUTION INTRAVENOUS ONCE
Status: COMPLETED | OUTPATIENT
Start: 2021-10-17 | End: 2021-10-17

## 2021-10-17 RX ADMIN — SODIUM CHLORIDE 3 ML: 9 INJECTION, SOLUTION INTRAMUSCULAR; INTRAVENOUS; SUBCUTANEOUS at 02:10

## 2021-10-17 RX ADMIN — MAGNESIUM SULFATE 2 G: 2 INJECTION INTRAVENOUS at 09:10

## 2021-10-17 RX ADMIN — MORPHINE SULFATE 1 MG: 2 INJECTION, SOLUTION INTRAMUSCULAR; INTRAVENOUS at 02:10

## 2021-10-17 RX ADMIN — FAMOTIDINE 20 MG: 10 INJECTION INTRAVENOUS at 09:10

## 2021-10-18 ENCOUNTER — TELEPHONE (OUTPATIENT)
Dept: GASTROENTEROLOGY | Facility: CLINIC | Age: 76
End: 2021-10-18

## 2021-10-18 ENCOUNTER — TELEPHONE (OUTPATIENT)
Dept: HEMATOLOGY/ONCOLOGY | Facility: CLINIC | Age: 76
End: 2021-10-18

## 2021-10-18 DIAGNOSIS — Z23 ENCOUNTER FOR VACCINATION: Primary | ICD-10-CM

## 2021-10-18 DIAGNOSIS — D46.Z MDS (MYELODYSPLASTIC SYNDROME), LOW GRADE: Primary | ICD-10-CM

## 2021-10-18 LAB — HCV AB SERPL QL IA: NEGATIVE

## 2021-10-21 ENCOUNTER — HOSPITAL ENCOUNTER (OUTPATIENT)
Dept: RADIOLOGY | Facility: HOSPITAL | Age: 76
Discharge: HOME | End: 2021-10-21
Attending: PHYSICIAN ASSISTANT
Payer: MEDICARE

## 2021-10-21 DIAGNOSIS — R41.3 MEMORY CHANGE: ICD-10-CM

## 2021-10-21 DIAGNOSIS — R25.1 TREMOR: ICD-10-CM

## 2021-10-21 PROCEDURE — 70551 MRI BRAIN WITHOUT CONTRAST: ICD-10-PCS | Mod: 26,,, | Performed by: RADIOLOGY

## 2021-10-21 PROCEDURE — 70551 MRI BRAIN STEM W/O DYE: CPT | Mod: 26,,, | Performed by: RADIOLOGY

## 2021-10-21 PROCEDURE — 70551 MRI BRAIN STEM W/O DYE: CPT | Mod: TC

## 2021-10-26 ENCOUNTER — OFFICE VISIT (OUTPATIENT)
Dept: HEMATOLOGY/ONCOLOGY | Facility: CLINIC | Age: 76
End: 2021-10-26
Payer: MEDICARE

## 2021-10-26 ENCOUNTER — LAB VISIT (OUTPATIENT)
Dept: LAB | Facility: HOSPITAL | Age: 76
End: 2021-10-26
Payer: MEDICARE

## 2021-10-26 VITALS
SYSTOLIC BLOOD PRESSURE: 122 MMHG | DIASTOLIC BLOOD PRESSURE: 58 MMHG | HEART RATE: 72 BPM | TEMPERATURE: 98 F | HEIGHT: 62 IN | OXYGEN SATURATION: 99 % | BODY MASS INDEX: 23.69 KG/M2 | WEIGHT: 128.75 LBS | RESPIRATION RATE: 16 BRPM

## 2021-10-26 DIAGNOSIS — D46.Z MDS (MYELODYSPLASTIC SYNDROME), LOW GRADE: ICD-10-CM

## 2021-10-26 DIAGNOSIS — D69.6 THROMBOCYTOPENIA: ICD-10-CM

## 2021-10-26 DIAGNOSIS — D46.Z MDS (MYELODYSPLASTIC SYNDROME), LOW GRADE: Primary | ICD-10-CM

## 2021-10-26 LAB
ALBUMIN SERPL BCP-MCNC: 3.9 G/DL (ref 3.5–5.2)
ALP SERPL-CCNC: 76 U/L (ref 55–135)
ALT SERPL W/O P-5'-P-CCNC: 23 U/L (ref 10–44)
ANION GAP SERPL CALC-SCNC: 8 MMOL/L (ref 8–16)
AST SERPL-CCNC: 23 U/L (ref 10–40)
BASOPHILS # BLD AUTO: 0.06 K/UL (ref 0–0.2)
BASOPHILS NFR BLD: 0.9 % (ref 0–1.9)
BILIRUB SERPL-MCNC: 1.3 MG/DL (ref 0.1–1)
BUN SERPL-MCNC: 11 MG/DL (ref 8–23)
CALCIUM SERPL-MCNC: 10.3 MG/DL (ref 8.7–10.5)
CHLORIDE SERPL-SCNC: 98 MMOL/L (ref 95–110)
CO2 SERPL-SCNC: 28 MMOL/L (ref 23–29)
CREAT SERPL-MCNC: 0.8 MG/DL (ref 0.5–1.4)
DIFFERENTIAL METHOD: ABNORMAL
EOSINOPHIL # BLD AUTO: 0.4 K/UL (ref 0–0.5)
EOSINOPHIL NFR BLD: 5.8 % (ref 0–8)
ERYTHROCYTE [DISTWIDTH] IN BLOOD BY AUTOMATED COUNT: 13.4 % (ref 11.5–14.5)
EST. GFR  (AFRICAN AMERICAN): >60 ML/MIN/1.73 M^2
EST. GFR  (NON AFRICAN AMERICAN): >60 ML/MIN/1.73 M^2
FOLATE SERPL-MCNC: 5.7 NG/ML (ref 4–24)
GLUCOSE SERPL-MCNC: 151 MG/DL (ref 70–110)
HCT VFR BLD AUTO: 38.4 % (ref 37–48.5)
HGB BLD-MCNC: 12.3 G/DL (ref 12–16)
IMM GRANULOCYTES # BLD AUTO: 0.03 K/UL (ref 0–0.04)
IMM GRANULOCYTES NFR BLD AUTO: 0.5 % (ref 0–0.5)
IRON SERPL-MCNC: 96 UG/DL (ref 30–160)
LDH SERPL L TO P-CCNC: 172 U/L (ref 110–260)
LYMPHOCYTES # BLD AUTO: 1.9 K/UL (ref 1–4.8)
LYMPHOCYTES NFR BLD: 29.4 % (ref 18–48)
MCH RBC QN AUTO: 32.2 PG (ref 27–31)
MCHC RBC AUTO-ENTMCNC: 32 G/DL (ref 32–36)
MCV RBC AUTO: 101 FL (ref 82–98)
MONOCYTES # BLD AUTO: 0.6 K/UL (ref 0.3–1)
MONOCYTES NFR BLD: 9 % (ref 4–15)
NEUTROPHILS # BLD AUTO: 3.6 K/UL (ref 1.8–7.7)
NEUTROPHILS NFR BLD: 54.4 % (ref 38–73)
NRBC BLD-RTO: 0 /100 WBC
PLATELET # BLD AUTO: 107 K/UL (ref 150–450)
PMV BLD AUTO: 10.6 FL (ref 9.2–12.9)
POTASSIUM SERPL-SCNC: 4.2 MMOL/L (ref 3.5–5.1)
PROT SERPL-MCNC: 7.2 G/DL (ref 6–8.4)
RBC # BLD AUTO: 3.82 M/UL (ref 4–5.4)
SATURATED IRON: 24 % (ref 20–50)
SODIUM SERPL-SCNC: 134 MMOL/L (ref 136–145)
TOTAL IRON BINDING CAPACITY: 403 UG/DL (ref 250–450)
TRANSFERRIN SERPL-MCNC: 272 MG/DL (ref 200–375)
VIT B12 SERPL-MCNC: 762 PG/ML (ref 210–950)
WBC # BLD AUTO: 6.59 K/UL (ref 3.9–12.7)

## 2021-10-26 PROCEDURE — 83615 LACTATE (LD) (LDH) ENZYME: CPT | Performed by: STUDENT IN AN ORGANIZED HEALTH CARE EDUCATION/TRAINING PROGRAM

## 2021-10-26 PROCEDURE — 99214 OFFICE O/P EST MOD 30 MIN: CPT | Mod: S$PBB,GC,, | Performed by: STUDENT IN AN ORGANIZED HEALTH CARE EDUCATION/TRAINING PROGRAM

## 2021-10-26 PROCEDURE — 99215 OFFICE O/P EST HI 40 MIN: CPT | Mod: PBBFAC | Performed by: STUDENT IN AN ORGANIZED HEALTH CARE EDUCATION/TRAINING PROGRAM

## 2021-10-26 PROCEDURE — 99999 PR PBB SHADOW E&M-EST. PATIENT-LVL V: ICD-10-PCS | Mod: PBBFAC,GC,, | Performed by: STUDENT IN AN ORGANIZED HEALTH CARE EDUCATION/TRAINING PROGRAM

## 2021-10-26 PROCEDURE — 80053 COMPREHEN METABOLIC PANEL: CPT | Performed by: STUDENT IN AN ORGANIZED HEALTH CARE EDUCATION/TRAINING PROGRAM

## 2021-10-26 PROCEDURE — 84466 ASSAY OF TRANSFERRIN: CPT | Performed by: STUDENT IN AN ORGANIZED HEALTH CARE EDUCATION/TRAINING PROGRAM

## 2021-10-26 PROCEDURE — 85025 COMPLETE CBC W/AUTO DIFF WBC: CPT | Performed by: STUDENT IN AN ORGANIZED HEALTH CARE EDUCATION/TRAINING PROGRAM

## 2021-10-26 PROCEDURE — 36415 COLL VENOUS BLD VENIPUNCTURE: CPT | Performed by: STUDENT IN AN ORGANIZED HEALTH CARE EDUCATION/TRAINING PROGRAM

## 2021-10-26 PROCEDURE — 99214 PR OFFICE/OUTPT VISIT, EST, LEVL IV, 30-39 MIN: ICD-10-PCS | Mod: S$PBB,GC,, | Performed by: STUDENT IN AN ORGANIZED HEALTH CARE EDUCATION/TRAINING PROGRAM

## 2021-10-26 PROCEDURE — 99999 PR PBB SHADOW E&M-EST. PATIENT-LVL V: CPT | Mod: PBBFAC,GC,, | Performed by: STUDENT IN AN ORGANIZED HEALTH CARE EDUCATION/TRAINING PROGRAM

## 2021-10-26 PROCEDURE — 82607 VITAMIN B-12: CPT | Mod: GA | Performed by: STUDENT IN AN ORGANIZED HEALTH CARE EDUCATION/TRAINING PROGRAM

## 2021-10-26 PROCEDURE — 82746 ASSAY OF FOLIC ACID SERUM: CPT | Mod: GA | Performed by: STUDENT IN AN ORGANIZED HEALTH CARE EDUCATION/TRAINING PROGRAM

## 2021-10-26 RX ORDER — LANCETS 28 GAUGE
EACH MISCELLANEOUS DAILY
COMMUNITY
Start: 2021-10-05 | End: 2021-11-10

## 2021-10-27 ENCOUNTER — HOSPITAL ENCOUNTER (OUTPATIENT)
Dept: RADIOLOGY | Facility: HOSPITAL | Age: 76
Discharge: HOME | End: 2021-10-27
Attending: INTERNAL MEDICINE
Payer: MEDICARE

## 2021-10-27 ENCOUNTER — HOSPITAL ENCOUNTER (OUTPATIENT)
Dept: CARDIOLOGY | Facility: HOSPITAL | Age: 76
Discharge: HOME | End: 2021-10-27
Attending: INTERNAL MEDICINE
Payer: MEDICARE

## 2021-10-27 VITALS
RESPIRATION RATE: 18 BRPM | HEART RATE: 70 BPM | SYSTOLIC BLOOD PRESSURE: 126 MMHG | HEIGHT: 62 IN | BODY MASS INDEX: 23.74 KG/M2 | WEIGHT: 129 LBS | DIASTOLIC BLOOD PRESSURE: 70 MMHG

## 2021-10-27 DIAGNOSIS — R07.89 OTHER CHEST PAIN: ICD-10-CM

## 2021-10-27 DIAGNOSIS — R10.13 EPIGASTRIC PAIN: ICD-10-CM

## 2021-10-27 LAB
ASCENDING AORTA: 3.14 CM
AV INDEX (PROSTH): 0.78
AV MEAN GRADIENT: 6 MMHG
AV PEAK GRADIENT: 11 MMHG
AV VALVE AREA: 2.06 CM2
AV VELOCITY RATIO: 0.77
BSA FOR ECHO PROCEDURE: 1.6 M2
CV ECHO LV RWT: 0.33 CM
CV STRESS BASE HR: 70 BPM
DIASTOLIC BLOOD PRESSURE: 70 MMHG
DOP CALC AO PEAK VEL: 1.66 M/S
DOP CALC AO VTI: 34.66 CM
DOP CALC LVOT AREA: 2.7 CM2
DOP CALC LVOT DIAMETER: 1.84 CM
DOP CALC LVOT PEAK VEL: 1.28 M/S
DOP CALC LVOT STROKE VOLUME: 71.41 CM3
DOP CALCLVOT PEAK VEL VTI: 26.87 CM
E WAVE DECELERATION TIME: 324.45 MSEC
E/A RATIO: 0.72
E/E' RATIO: 9.29 M/S
ECHO LV POSTERIOR WALL: 0.69 CM (ref 0.6–1.1)
EJECTION FRACTION: 70 %
FRACTIONAL SHORTENING: 42 % (ref 28–44)
INTERVENTRICULAR SEPTUM: 0.66 CM (ref 0.6–1.1)
LA MAJOR: 4.87 CM
LA MINOR: 4.51 CM
LA WIDTH: 3.16 CM
LEFT ATRIUM SIZE: 3.41 CM
LEFT ATRIUM VOLUME INDEX MOD: 22.9 ML/M2
LEFT ATRIUM VOLUME INDEX: 27 ML/M2
LEFT ATRIUM VOLUME MOD: 36.45 CM3
LEFT ATRIUM VOLUME: 42.89 CM3
LEFT INTERNAL DIMENSION IN SYSTOLE: 2.41 CM (ref 2.1–4)
LEFT VENTRICLE DIASTOLIC VOLUME INDEX: 47.49 ML/M2
LEFT VENTRICLE DIASTOLIC VOLUME: 75.51 ML
LEFT VENTRICLE MASS INDEX: 50 G/M2
LEFT VENTRICLE SYSTOLIC VOLUME INDEX: 12.8 ML/M2
LEFT VENTRICLE SYSTOLIC VOLUME: 20.31 ML
LEFT VENTRICULAR INTERNAL DIMENSION IN DIASTOLE: 4.13 CM (ref 3.5–6)
LEFT VENTRICULAR MASS: 78.91 G
LV LATERAL E/E' RATIO: 7.9 M/S
LV SEPTAL E/E' RATIO: 11.29 M/S
MV PEAK A VEL: 1.1 M/S
MV PEAK E VEL: 0.79 M/S
MV STENOSIS PRESSURE HALF TIME: 94.09 MS
MV VALVE AREA P 1/2 METHOD: 2.34 CM2
OHS CV CPX 1 MINUTE RECOVERY HEART RATE: 133 BPM
OHS CV CPX 85 PERCENT MAX PREDICTED HEART RATE MALE: 117
OHS CV CPX MAX PREDICTED HEART RATE: 137
OHS CV CPX PATIENT IS FEMALE: 1
OHS CV CPX PATIENT IS MALE: 0
OHS CV CPX PEAK DIASTOLIC BLOOD PRESSURE: 37 MMHG
OHS CV CPX PEAK HEAR RATE: 133 BPM
OHS CV CPX PEAK RATE PRESSURE PRODUCT: NORMAL
OHS CV CPX PEAK SYSTOLIC BLOOD PRESSURE: 107 MMHG
OHS CV CPX PERCENT MAX PREDICTED HEART RATE ACHIEVED: 97
OHS CV CPX RATE PRESSURE PRODUCT PRESENTING: 8960
PISA TR MAX VEL: 2.5 M/S
PULM VEIN S/D RATIO: 2
PV PEAK D VEL: 0.34 M/S
PV PEAK S VEL: 0.68 M/S
RA MAJOR: 4.7 CM
RA PRESSURE: 3 MMHG
RA WIDTH: 3.67 CM
RIGHT VENTRICULAR END-DIASTOLIC DIMENSION: 2.74 CM
RV TISSUE DOPPLER FREE WALL SYSTOLIC VELOCITY 1 (APICAL 4 CHAMBER VIEW): 15.01 CM/S
SINUS: 3 CM
STJ: 2.31 CM
SYSTOLIC BLOOD PRESSURE: 128 MMHG
TDI LATERAL: 0.1 M/S
TDI SEPTAL: 0.07 M/S
TDI: 0.09 M/S
TR MAX PG: 25 MMHG
TRICUSPID ANNULAR PLANE SYSTOLIC EXCURSION: 2.32 CM
TV REST PULMONARY ARTERY PRESSURE: 28 MMHG

## 2021-10-27 PROCEDURE — 76775 US EXAM ABDO BACK WALL LIM: CPT | Mod: 26,XS,, | Performed by: INTERNAL MEDICINE

## 2021-10-27 PROCEDURE — 63600175 PHARM REV CODE 636 W HCPCS: Performed by: INTERNAL MEDICINE

## 2021-10-27 PROCEDURE — 76775 US MESENTERIC ISCHEMIA STUDY (XPD): ICD-10-PCS | Mod: 26,XS,, | Performed by: INTERNAL MEDICINE

## 2021-10-27 PROCEDURE — 93351 STRESS TTE COMPLETE: CPT | Mod: 26,,, | Performed by: INTERNAL MEDICINE

## 2021-10-27 PROCEDURE — 93976 US MESENTERIC ISCHEMIA STUDY (XPD): ICD-10-PCS | Mod: 26,,, | Performed by: INTERNAL MEDICINE

## 2021-10-27 PROCEDURE — 76775 US EXAM ABDO BACK WALL LIM: CPT | Mod: 59,TC

## 2021-10-27 PROCEDURE — 93351 STRESS TTE COMPLETE: CPT

## 2021-10-27 PROCEDURE — 93351 STRESS ECHO (CUPID ONLY): ICD-10-PCS | Mod: 26,,, | Performed by: INTERNAL MEDICINE

## 2021-10-27 PROCEDURE — 93976 VASCULAR STUDY: CPT | Mod: 26,,, | Performed by: INTERNAL MEDICINE

## 2021-10-27 RX ORDER — DOBUTAMINE HYDROCHLORIDE 400 MG/100ML
30 INJECTION INTRAVENOUS
Status: COMPLETED | OUTPATIENT
Start: 2021-10-27 | End: 2021-10-27

## 2021-10-27 RX ORDER — ATROPINE SULFATE 0.1 MG/ML
0.25 INJECTION INTRAVENOUS
Status: COMPLETED | OUTPATIENT
Start: 2021-10-27 | End: 2021-10-27

## 2021-10-27 RX ADMIN — DOBUTAMINE HYDROCHLORIDE 30 MCG/KG/MIN: 400 INJECTION INTRAVENOUS at 03:10

## 2021-10-27 RX ADMIN — ATROPINE SULFATE 0.25 MG: 0.1 INJECTION PARENTERAL at 03:10

## 2021-11-05 ENCOUNTER — HOSPITAL ENCOUNTER (OUTPATIENT)
Dept: RADIOLOGY | Facility: HOSPITAL | Age: 76
Discharge: HOME | End: 2021-11-05
Attending: INTERNAL MEDICINE
Payer: MEDICARE

## 2021-11-05 DIAGNOSIS — Z90.49 STATUS POST CHOLECYSTECTOMY: ICD-10-CM

## 2021-11-05 DIAGNOSIS — K86.89 ATROPHIC PANCREAS: ICD-10-CM

## 2021-11-05 DIAGNOSIS — R93.3 ABNORMAL FINDINGS ON DIAGNOSTIC IMAGING OF OTHER PARTS OF DIGESTIVE TRACT: ICD-10-CM

## 2021-11-05 DIAGNOSIS — R10.13 EPIGASTRIC PAIN: ICD-10-CM

## 2021-11-05 PROCEDURE — 25500020 PHARM REV CODE 255: Performed by: INTERNAL MEDICINE

## 2021-11-05 PROCEDURE — 76376 3D RENDER W/INTRP POSTPROCES: CPT | Mod: 26,,, | Performed by: RADIOLOGY

## 2021-11-05 PROCEDURE — 76376 3D RENDER W/INTRP POSTPROCES: CPT | Mod: TC

## 2021-11-05 PROCEDURE — 76376 MRI ABDOMEN WITH AND WO_INC MRCP: ICD-10-PCS | Mod: 26,,, | Performed by: RADIOLOGY

## 2021-11-05 PROCEDURE — 74183 MRI ABDOMEN WITH AND WO_INC MRCP: ICD-10-PCS | Mod: 26,,, | Performed by: RADIOLOGY

## 2021-11-05 PROCEDURE — A9585 GADOBUTROL INJECTION: HCPCS | Performed by: INTERNAL MEDICINE

## 2021-11-05 PROCEDURE — 74183 MRI ABD W/O CNTR FLWD CNTR: CPT | Mod: 26,,, | Performed by: RADIOLOGY

## 2021-11-05 RX ORDER — GADOBUTROL 604.72 MG/ML
10 INJECTION INTRAVENOUS
Status: COMPLETED | OUTPATIENT
Start: 2021-11-05 | End: 2021-11-05

## 2021-11-05 RX ADMIN — GADOBUTROL 10 ML: 604.72 INJECTION INTRAVENOUS at 03:11

## 2021-11-08 ENCOUNTER — TELEPHONE (OUTPATIENT)
Dept: UROLOGY | Facility: CLINIC | Age: 76
End: 2021-11-08
Payer: MEDICARE

## 2021-11-09 ENCOUNTER — PATIENT MESSAGE (OUTPATIENT)
Dept: GASTROENTEROLOGY | Facility: CLINIC | Age: 76
End: 2021-11-09
Payer: MEDICARE

## 2021-11-09 ENCOUNTER — HOSPITAL ENCOUNTER (OUTPATIENT)
Facility: HOSPITAL | Age: 76
Discharge: HOME | End: 2021-11-09
Attending: UROLOGY | Admitting: UROLOGY
Payer: MEDICARE

## 2021-11-09 VITALS
BODY MASS INDEX: 24.29 KG/M2 | SYSTOLIC BLOOD PRESSURE: 123 MMHG | TEMPERATURE: 98 F | DIASTOLIC BLOOD PRESSURE: 59 MMHG | WEIGHT: 132 LBS | HEART RATE: 67 BPM | RESPIRATION RATE: 16 BRPM | HEIGHT: 62 IN

## 2021-11-09 DIAGNOSIS — N32.9 BLADDER DISORDER: ICD-10-CM

## 2021-11-09 DIAGNOSIS — R32 URINARY INCONTINENCE, UNSPECIFIED TYPE: ICD-10-CM

## 2021-11-09 PROCEDURE — 74430 PR  X-RAY CYSTOGRAM, MIN 3 VIEW: ICD-10-PCS | Mod: 26,,, | Performed by: UROLOGY

## 2021-11-09 PROCEDURE — 51600 PR INJECTION FOR BLADDER X-RAY: ICD-10-PCS | Mod: 51,,, | Performed by: UROLOGY

## 2021-11-09 PROCEDURE — 36000707: Performed by: UROLOGY

## 2021-11-09 PROCEDURE — 51600 INJECTION FOR BLADDER X-RAY: CPT | Mod: 51,,, | Performed by: UROLOGY

## 2021-11-09 PROCEDURE — 51784 ANAL/URINARY MUSCLE STUDY: CPT | Mod: 26,51,, | Performed by: UROLOGY

## 2021-11-09 PROCEDURE — 51741 ELECTRO-UROFLOWMETRY FIRST: CPT | Mod: 26,51,, | Performed by: UROLOGY

## 2021-11-09 PROCEDURE — 51728 CYSTOMETROGRAM W/VP: CPT | Mod: 26,,, | Performed by: UROLOGY

## 2021-11-09 PROCEDURE — 51728 PR COMPLEX CYSTOMETROGRAM VOIDING PRESSURE STUDIES: ICD-10-PCS | Mod: 26,,, | Performed by: UROLOGY

## 2021-11-09 PROCEDURE — 51784 PR ANAL/URINARY MUSCLE STUDY: ICD-10-PCS | Mod: 26,51,, | Performed by: UROLOGY

## 2021-11-09 PROCEDURE — 36000706: Performed by: UROLOGY

## 2021-11-09 PROCEDURE — 27200973 HC CYSTO SUPPLY IV (URODYNAMICS): Performed by: UROLOGY

## 2021-11-09 PROCEDURE — 74430 CONTRAST X-RAY BLADDER: CPT | Mod: 26,,, | Performed by: UROLOGY

## 2021-11-09 PROCEDURE — 51741 PR UROFLOWMETRY, COMPLEX: ICD-10-PCS | Mod: 26,51,, | Performed by: UROLOGY

## 2021-11-09 PROCEDURE — 51797 PR VOIDING PRESS STUDY INTRA-ABDOMINAL VOID: ICD-10-PCS | Mod: 26,,, | Performed by: UROLOGY

## 2021-11-09 PROCEDURE — 51797 INTRAABDOMINAL PRESSURE TEST: CPT | Mod: 26,,, | Performed by: UROLOGY

## 2021-11-10 ENCOUNTER — OFFICE VISIT (OUTPATIENT)
Dept: CARDIOLOGY | Facility: CLINIC | Age: 76
End: 2021-11-10
Payer: MEDICARE

## 2021-11-10 VITALS
RESPIRATION RATE: 20 BRPM | WEIGHT: 129 LBS | HEART RATE: 76 BPM | SYSTOLIC BLOOD PRESSURE: 102 MMHG | BODY MASS INDEX: 23.74 KG/M2 | DIASTOLIC BLOOD PRESSURE: 50 MMHG | HEIGHT: 62 IN

## 2021-11-10 DIAGNOSIS — R07.89 OTHER CHEST PAIN: ICD-10-CM

## 2021-11-10 DIAGNOSIS — E78.49 OTHER HYPERLIPIDEMIA: ICD-10-CM

## 2021-11-10 PROCEDURE — 99214 OFFICE O/P EST MOD 30 MIN: CPT | Mod: PBBFAC,PO | Performed by: INTERNAL MEDICINE

## 2021-11-10 PROCEDURE — 99999 PR PBB SHADOW E&M-EST. PATIENT-LVL IV: CPT | Mod: PBBFAC,,, | Performed by: INTERNAL MEDICINE

## 2021-11-10 PROCEDURE — 99214 PR OFFICE/OUTPT VISIT, EST, LEVL IV, 30-39 MIN: ICD-10-PCS | Mod: S$PBB,,, | Performed by: INTERNAL MEDICINE

## 2021-11-10 PROCEDURE — 99999 PR PBB SHADOW E&M-EST. PATIENT-LVL IV: ICD-10-PCS | Mod: PBBFAC,,, | Performed by: INTERNAL MEDICINE

## 2021-11-10 PROCEDURE — 99214 OFFICE O/P EST MOD 30 MIN: CPT | Mod: S$PBB,,, | Performed by: INTERNAL MEDICINE

## 2021-11-11 ENCOUNTER — PATIENT MESSAGE (OUTPATIENT)
Dept: MEDSURG UNIT | Facility: HOSPITAL | Age: 76
End: 2021-11-11
Payer: MEDICARE

## 2021-11-11 ENCOUNTER — TELEPHONE (OUTPATIENT)
Dept: GASTROENTEROLOGY | Facility: CLINIC | Age: 76
End: 2021-11-11
Payer: MEDICARE

## 2021-11-11 ENCOUNTER — TELEPHONE (OUTPATIENT)
Dept: UROLOGY | Facility: CLINIC | Age: 76
End: 2021-11-11
Payer: MEDICARE

## 2021-11-17 ENCOUNTER — CLINICAL SUPPORT (OUTPATIENT)
Dept: INFECTIOUS DISEASES | Facility: CLINIC | Age: 76
End: 2021-11-17
Payer: MEDICARE

## 2021-11-17 DIAGNOSIS — Z23 ENCOUNTER FOR VACCINATION: ICD-10-CM

## 2021-11-17 PROCEDURE — 99999 PR PBB SHADOW E&M-EST. PATIENT-LVL III: ICD-10-PCS | Mod: PBBFAC,,,

## 2021-11-17 PROCEDURE — 90739 HEPB VACC 2/4 DOSE ADULT IM: CPT | Mod: PBBFAC

## 2021-11-17 PROCEDURE — 99999 PR PBB SHADOW E&M-EST. PATIENT-LVL III: CPT | Mod: PBBFAC,,,

## 2021-11-17 PROCEDURE — 99213 OFFICE O/P EST LOW 20 MIN: CPT | Mod: PBBFAC

## 2021-11-23 ENCOUNTER — OFFICE VISIT (OUTPATIENT)
Dept: GASTROENTEROLOGY | Facility: CLINIC | Age: 76
End: 2021-11-23
Payer: MEDICARE

## 2021-11-23 VITALS
DIASTOLIC BLOOD PRESSURE: 82 MMHG | HEART RATE: 82 BPM | BODY MASS INDEX: 23.85 KG/M2 | WEIGHT: 129.63 LBS | HEIGHT: 62 IN | SYSTOLIC BLOOD PRESSURE: 113 MMHG

## 2021-11-23 DIAGNOSIS — R79.0 LOW MAGNESIUM LEVEL: Primary | ICD-10-CM

## 2021-11-23 DIAGNOSIS — Z86.010 HISTORY OF ADENOMATOUS POLYP OF COLON: ICD-10-CM

## 2021-11-23 DIAGNOSIS — Z79.899 ENCOUNTER FOR MONITORING LONG-TERM PROTON PUMP INHIBITOR THERAPY: ICD-10-CM

## 2021-11-23 DIAGNOSIS — D69.6 THROMBOCYTOPENIA: ICD-10-CM

## 2021-11-23 DIAGNOSIS — Z90.49 STATUS POST CHOLECYSTECTOMY: ICD-10-CM

## 2021-11-23 DIAGNOSIS — K58.2 IRRITABLE BOWEL SYNDROME WITH BOTH CONSTIPATION AND DIARRHEA: ICD-10-CM

## 2021-11-23 DIAGNOSIS — Z90.49 HISTORY OF PARTIAL COLECTOMY: ICD-10-CM

## 2021-11-23 DIAGNOSIS — K86.89 ATROPHIC PANCREAS: ICD-10-CM

## 2021-11-23 DIAGNOSIS — Z51.81 ENCOUNTER FOR MONITORING LONG-TERM PROTON PUMP INHIBITOR THERAPY: ICD-10-CM

## 2021-11-23 DIAGNOSIS — D46.9 MYELODYSPLASTIC SYNDROME: ICD-10-CM

## 2021-11-23 DIAGNOSIS — Z87.19 HISTORY OF CONSTIPATION: ICD-10-CM

## 2021-11-23 DIAGNOSIS — R19.7 DIARRHEA IN ADULT PATIENT: ICD-10-CM

## 2021-11-23 PROCEDURE — 99214 OFFICE O/P EST MOD 30 MIN: CPT | Mod: PBBFAC | Performed by: INTERNAL MEDICINE

## 2021-11-23 PROCEDURE — 99214 OFFICE O/P EST MOD 30 MIN: CPT | Mod: S$PBB,,, | Performed by: INTERNAL MEDICINE

## 2021-11-23 PROCEDURE — 99999 PR PBB SHADOW E&M-EST. PATIENT-LVL IV: ICD-10-PCS | Mod: PBBFAC,,, | Performed by: INTERNAL MEDICINE

## 2021-11-23 PROCEDURE — 99214 PR OFFICE/OUTPT VISIT, EST, LEVL IV, 30-39 MIN: ICD-10-PCS | Mod: S$PBB,,, | Performed by: INTERNAL MEDICINE

## 2021-11-23 PROCEDURE — 99999 PR PBB SHADOW E&M-EST. PATIENT-LVL IV: CPT | Mod: PBBFAC,,, | Performed by: INTERNAL MEDICINE

## 2021-11-23 RX ORDER — CHOLESTYRAMINE 4 G/4.8G
1 POWDER, FOR SUSPENSION ORAL DAILY PRN
Qty: 90 PACKET | Refills: 0 | Status: SHIPPED | OUTPATIENT
Start: 2021-11-23 | End: 2022-02-17

## 2021-11-23 RX ORDER — CHOLESTYRAMINE 4 G/4.8G
1 POWDER, FOR SUSPENSION ORAL DAILY PRN
Qty: 90 PACKET | Refills: 0 | OUTPATIENT
Start: 2021-11-23 | End: 2021-11-23 | Stop reason: SDUPTHER

## 2021-11-23 RX ORDER — UBIDECARENONE 30 MG
30 CAPSULE ORAL 3 TIMES DAILY
COMMUNITY
End: 2022-06-13

## 2021-11-26 ENCOUNTER — PATIENT MESSAGE (OUTPATIENT)
Dept: NEUROLOGY | Facility: CLINIC | Age: 76
End: 2021-11-26
Payer: MEDICARE

## 2021-12-01 ENCOUNTER — PATIENT MESSAGE (OUTPATIENT)
Dept: GASTROENTEROLOGY | Facility: CLINIC | Age: 76
End: 2021-12-01
Payer: MEDICARE

## 2021-12-02 ENCOUNTER — TELEPHONE (OUTPATIENT)
Dept: ENDOSCOPY | Facility: HOSPITAL | Age: 76
End: 2021-12-02
Payer: MEDICARE

## 2021-12-02 ENCOUNTER — TELEPHONE (OUTPATIENT)
Dept: NEUROLOGY | Facility: CLINIC | Age: 76
End: 2021-12-02
Payer: MEDICARE

## 2021-12-08 ENCOUNTER — PATIENT MESSAGE (OUTPATIENT)
Dept: GASTROENTEROLOGY | Facility: CLINIC | Age: 76
End: 2021-12-08
Payer: MEDICARE

## 2021-12-13 ENCOUNTER — TELEPHONE (OUTPATIENT)
Dept: ENDOSCOPY | Facility: HOSPITAL | Age: 76
End: 2021-12-13
Payer: MEDICARE

## 2021-12-20 NOTE — TELEPHONE ENCOUNTER
----- Message from Manjit Alvarez MD sent at 8/9/2017 12:18 AM CDT -----  Biopsy results of samples taken from the colon are negative for signs of colitis.  The polyps removed from the colon were benign (no cancerous changes), but considered a risk factor for cancer.  Follow-up as previously recommended with colonoscopy in 3 years.  Patient may follow-up in GI clinic for continued symptoms.  Can be with ANTONETTE, or with me if desired (would have to put on cancellation list).  MA to call patient with results.     
Pt informed of test results and will call if she wants to schedule an appointment.  
[id number]

## 2021-12-21 ENCOUNTER — OFFICE VISIT (OUTPATIENT)
Dept: NEUROLOGY | Facility: CLINIC | Age: 76
End: 2021-12-21
Payer: MEDICARE

## 2021-12-21 ENCOUNTER — CLINICAL SUPPORT (OUTPATIENT)
Dept: INFECTIOUS DISEASES | Facility: CLINIC | Age: 76
End: 2021-12-21
Payer: MEDICARE

## 2021-12-21 ENCOUNTER — TELEPHONE (OUTPATIENT)
Dept: NEUROLOGY | Facility: CLINIC | Age: 76
End: 2021-12-21
Payer: MEDICARE

## 2021-12-21 VITALS
SYSTOLIC BLOOD PRESSURE: 118 MMHG | BODY MASS INDEX: 23.77 KG/M2 | HEART RATE: 62 BPM | HEIGHT: 62 IN | WEIGHT: 129.19 LBS | DIASTOLIC BLOOD PRESSURE: 70 MMHG

## 2021-12-21 DIAGNOSIS — Z23 ENCOUNTER FOR VACCINATION: ICD-10-CM

## 2021-12-21 DIAGNOSIS — R41.3 MEMORY CHANGE: ICD-10-CM

## 2021-12-21 DIAGNOSIS — G20.C PARKINSONISM, UNSPECIFIED PARKINSONISM TYPE: ICD-10-CM

## 2021-12-21 DIAGNOSIS — R26.81 GAIT INSTABILITY: ICD-10-CM

## 2021-12-21 PROCEDURE — 99999 PR PBB SHADOW E&M-EST. PATIENT-LVL III: ICD-10-PCS | Mod: PBBFAC,,, | Performed by: PHYSICIAN ASSISTANT

## 2021-12-21 PROCEDURE — 99999 PR PBB SHADOW E&M-EST. PATIENT-LVL III: CPT | Mod: PBBFAC,,, | Performed by: PHYSICIAN ASSISTANT

## 2021-12-21 PROCEDURE — 99215 PR OFFICE/OUTPT VISIT, EST, LEVL V, 40-54 MIN: ICD-10-PCS | Mod: S$PBB,,, | Performed by: PHYSICIAN ASSISTANT

## 2021-12-21 PROCEDURE — 90739 HEPB VACC 2/4 DOSE ADULT IM: CPT | Mod: PBBFAC

## 2021-12-21 PROCEDURE — 99215 OFFICE O/P EST HI 40 MIN: CPT | Mod: S$PBB,,, | Performed by: PHYSICIAN ASSISTANT

## 2021-12-21 PROCEDURE — 99213 OFFICE O/P EST LOW 20 MIN: CPT | Mod: PBBFAC,25 | Performed by: PHYSICIAN ASSISTANT

## 2021-12-27 ENCOUNTER — CLINICAL SUPPORT (OUTPATIENT)
Dept: GASTROENTEROLOGY | Facility: CLINIC | Age: 76
End: 2021-12-27

## 2021-12-27 DIAGNOSIS — K58.2 IRRITABLE BOWEL SYNDROME WITH BOTH CONSTIPATION AND DIARRHEA: Primary | ICD-10-CM

## 2021-12-27 DIAGNOSIS — Z90.49 HISTORY OF CHOLECYSTECTOMY: ICD-10-CM

## 2021-12-27 DIAGNOSIS — R79.0 LOW MAGNESIUM LEVEL: ICD-10-CM

## 2021-12-27 DIAGNOSIS — E11.9 TYPE 2 DIABETES MELLITUS WITHOUT COMPLICATION, WITHOUT LONG-TERM CURRENT USE OF INSULIN: ICD-10-CM

## 2021-12-27 NOTE — PROGRESS NOTES
NUTRITIONAL ASSESSMENT   Initial encounter 3/19   Referring Provider: Dr Betts/Li       Reason for Visit: Low Fodmap diet coordinated with DM guidelines and short bowel guidelines ( small bowel resection 2014 ) and colectomy 6/11    Hx IBS, fecal soiling with no apparent pattern in cause/effect with oral diet : tolerates salads without fecal urgency ; tolerates low fiber food ( rice, grits,white bread) with decrease in fecal urgency ; hx of frequent periods of dehydration ; does use electrolyte replacements ( G2 ) on occasion .      Hx : history of CRC, multiple admissions for SBO, IBS, microscopic colitis  Bowel movements,  5-6/day, generally starting formed in the morning and progressively more loose; pattern  for several years. She notes unpredictable  fecal incontinence which has been ongoing for years as well.  prior workup negative defecography and has attempted other treatments including PT without improvement. Dr. Erazo ([facility])briefly considered for interstim but appears to have been not further considered as incontinence improved with fiber ( fiber con in am and Metamucil in evening) .       2/4/2019  1:56 AM - [facility], [facility]      Component Value Flag Ref Range Units Status   Lipase 102 Abnormally high   H  4 - 60 U/L Final      2/1/2019  3:00 PM - [facility], [facility]      Component Value Flag Ref Range Units Status   Vitamin B-12 615    210 - 950 pg/mL Final      2/1/2019  2:48 PM - B[facility], [facility]      Component Value Flag Ref Range Units Status   Magnesium 1.8    1.6 - 2.6 mg/dL Final          Usual Weight: 170# at age 58 years   Weight Change/Time: decline of 145#> 140# over the past year       Current Diet:   10 am Spelt toast and egg  Or oatmeal with walnuts, berries   2-3pm salad with meat or salad with chicken   4pm Grapes and strawberries   7-8 pm Cheese and raw vegetables or rice cake with fruit      Appetite/Current Intake: good   Exercise/Physical Activity: sedentary    Nutritional/Herbal Supplements: magnesium   Chewing/Swallowing Problems: none   Symptoms: fat intolerance ; fiber supplements and salads tolerated but high insoluble fiber foods ( strawberries, grapes, nuts ) trigger diarrhea ; processed cho - low fiber starches assist with firming of stool      Estimated Kcal Need: 7853-9660 calories ( MSJ X 1.2)   Estimated Protein Need: 50-60 grams      Nutritional History: Has always followed high fiber / whole grains to address DM and BG maintenance      Nutritional Diagnoses  Problem: altered GI function   Etiology: related to small bowel resection and colectomy   Symptoms: as evidenced by diarrhea      Educational Need? yes  Barriers: readiness to learn  Discussed with: patient  Interventions: Patient taught nutrition information regarding need for low fiber diet with additional fluid/electrolyte replacement post small bowel resection and colectomy -- discussed the role of soluble fiber ( firming of stool ) vs insoluble fiber ( increased transit ) ; separation of liquids form meals ; reduced fat and avoidance of high fodmap foods ; use of crock pot for cooking vegetables and making easier to digest discussed .   Goals/Recommendations:   Actions Taken: instruct/provide written information  [facility] GI nutrition ( Short Bowel syndrome; low Fodmap)   Patient and/or family comprehend instructions: yes  Outcome: Verbalizes understanding  Monitoring: trial of short bowel syndrome diet with fodmap modifications and tracking odf change in bowel patterns      Counseling Time: 90 minutes        GI NUTRITION FOLLOW-UP  Lengthy discussion with patient and caregiver Keya ( with her during day to transport to appts and to take on outings to grocery ,etc)   Lengthy discussion with daughter Wendy regarding medications ( timing , multiple vits) especially Cholestyramine which must be timed 4 hours before meals , separate from thyroid and vitamins , etc     Referring  "professional: Dr. Ramiro Jefferson  Reason for visit : low magnesium levels ; recent initiation of Cholestyramine ( patient travelled to [place] and decided to start medication upon return home to  but was hospitalized in [place] for possible bowel obstruction) . Attending MD recommended Cholestyramine be used .   Changes since last visit : 2019  Per discussion with daughter in [place]( Wendy)  who manages meds - daughters who live locally prepare meals and bring by for her . Daughter reports she will often consume first meal in late afternoon ; Keya reports good appetite but anxiety about health and mixed feelings about eating " it doesn't matter what I eat because my doctor says I have bad plumbing in my bowel"  Or " I am just going to eat what I want if it changes in diet don't make a difference ."  · Decline in short term memory - more imposed food restrictions ( no dairy) or medication adjustments(  trial of no metformin to see if culprit in diarrhea - increased BG so patient fearful of eating) or use of med which requires timing ( cholestyramine) more confusion results   · Hand tremors with eating but no spillage identified   · Increasing BG >200-300mg/dl since discontinuation of Metformin ( patient craving more sweets )checks BG 4 times per day   · Preferred food items - fried foods on outings, stews, cheese, yogurt   · Recent suggestion per Primary Care MD to eliminate dairy in diet - patient buying lactase free milk - confused about what to eat avoiding cheese and yogurt which have been staples ; daughter notes she does not consume liquid meal replacements as she prefers savory/salty foods    · Patient thinks she is not to have salt but reports dizziness, etc with hx of low BP .    Bowel function : patient describes as leakage "diarrhea"  when she urinates but daughter notes this to be leakage around blockage and constipation the overall issue .   Patient has tried the following dietary management changes " :    · Cooked vegetables, fruits, starches and avoidance of raw fruits and vegetables, nuts, etc.  · Inclusion of salads when she dines out   · Elimination of fried foods   · Metamucil , miralax , etc   · Clear liquids       Reason for contact:    : low magnesium levels       Age: 76 y.o.  Sex: female  Past Medical History:   Diagnosis Date    Abdominal pain     Allergic rhinitis     Arthritis     Blood platelet disorder     Blood platelet disorder     Blood transfusion     during delivery and     Bowel obstruction     Cervical radiculopathy     followed by dr cloud    Colon cancer     transverse colon; resected; Stage IIA (pT3 pN0 MX)    Diabetes mellitus     Diarrhea     Falls 2020    Family history of breast cancer     Family history of colon cancer     Fatty liver     GERD (gastroesophageal reflux disease)     History of shingles     Hyperlipidemia     Hypothyroidism     Irritable bowel syndrome     Microscopic colitis     treated     Raynaud phenomenon     Raynaud's disease     Type 2 diabetes mellitus      Past Surgical History:   Procedure Laterality Date    APPENDECTOMY      BACK SURGERY      CARPAL TUNNEL RELEASE      bilateral      SECTION      CHOLECYSTECTOMY  1965    COLECTOMY  2011    Transverse colon resection by Dr. Aguirre    COLONOSCOPY N/A 2017    Procedure: COLONOSCOPY;  Surgeon: Manjit Alvarez MD;  Location: [address]);  Service: Endoscopy;  Laterality: N/A;    COLONOSCOPY N/A 2019    Procedure: COLONOSCOPY;  Surgeon: Ramiro Jefferson MD;  Location: [address]);  Service: Endoscopy;  Laterality: N/A;  PM prep    CYSTOSCOPY N/A 2021    Procedure: CYSTOSCOPY;  Surgeon: Viridiana Valenzuela MD;  Location: [address];  Service: Urology;  Laterality: N/A;    ENDOSCOPIC ULTRASOUND OF UPPER GASTROINTESTINAL TRACT N/A 2018    Procedure: ULTRASOUND, UPPER GI TRACT, ENDOSCOPIC;  Surgeon: Jose MARTINEZ  MD Deshawn;  Location: [facility] ENDO;  Service: Endoscopy;  Laterality: N/A;    ENDOSCOPIC ULTRASOUND OF UPPER GASTROINTESTINAL TRACT N/A 2019    Procedure: ULTRASOUND, UPPER GI TRACT, ENDOSCOPIC;  Surgeon: Jose Hess MD;  Location: [facility] (2ND FLR);  Service: Endoscopy;  Laterality: N/A;    ESOPHAGOGASTRODUODENOSCOPY N/A 2018    Procedure: EGD (ESOPHAGOGASTRODUODENOSCOPY);  Surgeon: Angelo Reynolds MD;  Location: [facility] (2ND FLR);  Service: Endoscopy;  Laterality: N/A;    ESOPHAGOGASTRODUODENOSCOPY N/A 2019    Procedure: EGD (ESOPHAGOGASTRODUODENOSCOPY);  Surgeon: Ramiro Jefferson MD;  Location: [facility] (4TH FLR);  Service: Endoscopy;  Laterality: N/A;    EYE SURGERY      Cataract Removal    FLUOROSCOPIC URODYNAMIC STUDY N/A 2021    Procedure: URODYNAMIC STUDY, FLUOROSCOPIC;  Surgeon: Viriidana Valenzuela MD;  Location: [facility] OR 1ST FLR;  Service: Urology;  Laterality: N/A;  90 minutes     HYSTERECTOMY      posterolateral fusion with autograft bone and Eun mineralized bone matrix  13    at [facility] for lumbar spine stenosis    TOE SURGERY      TONSILLECTOMY      TRIGGER FINGER RELEASE       Pertinent Medications:   Current Outpatient Medications on File Prior to Visit   Medication Sig Dispense Refill    ascorbic Acid (VITAMIN C) 500 mg CpSR Take 500 mg by mouth once daily.       atorvastatin (LIPITOR) 40 MG tablet TAKE 1 TABLET BY MOUTH  DAILY 90 tablet 3    azelastine (ASTELIN) 137 mcg (0.1 %) nasal spray 2 sprays (274 mcg total) by Nasal route 2 (two) times daily. 30 mL 1    biotin 5,000 mcg TbDL Take 1 tablet by mouth once daily.       blood-glucose meter (CONTOUR METER) kit Please dispense [employer] Contour meter and supplies Use as instructed Test Blood sugar once daily 1 each 0    butalbital-acetaminophen-caffeine -40 mg (FIORICET, ESGIC) -40 mg per tablet SMARTSI Tablet(s) By Mouth 1 to 3 Times Daily PRN      cholestyramine-aspartame  (CHOLESTYRAMINE LIGHT) 4 gram PwPk Take 1 packet (4 g total) by mouth daily as needed (Diarrhea). 90 packet 0    clotrimazole-betamethasone 1-0.05% (LOTRISONE) cream Apply topically 2 (two) times daily.      co-enzyme Q-10 30 mg capsule Take 30 mg by mouth 3 (three) times daily.      conjugated estrogens (PREMARIN) vaginal cream INSERT 1/2 GRAM VAGINALLY  TWICE WEEKLY 30 g 3    cranberry extract 200 mg Cap Take 1 capsule by mouth once daily.       D-MANNOSE ORAL Take 1 tablet by mouth once daily.       DULoxetine (CYMBALTA) 30 MG capsule Take 1 capsule (30 mg total) by mouth 2 (two) times daily. 180 capsule 3    flash glucose sensor (FREESTYLE EFREN 14 DAY SENSOR) Kit 1 application by Misc.(Non-Drug; Combo Route) route every 14 (fourteen) days. Disp 30 or 90 day refill 6 kit 6    fluticasone (FLONASE) 50 mcg/actuation nasal spray 2 sprays by Each Nare route daily as needed.   0    FREESTYLE EFREN 10 DAY READER Misc TEST as directed 3 each 3    FREESTYLE LITE STRIPS Strp TEST DAILY AS DIRECTED 50 strip 2    hydrocortisone 2.5 % cream Apply topically 2 (two) times daily as needed. 1 each 1    lancets (ONE TOUCH ULTRASOFT LANCETS) Misc Test blood sugar once daily 200 each 11    levothyroxine (SYNTHROID) 75 MCG tablet Take 1 tablet (75 mcg total) by mouth before breakfast. 90 tablet 3    magnesium 30 mg Tab Take 500 capsules by mouth.       mirabegron (MYRBETRIQ) 50 mg Tb24 Take 1 tablet (50 mg total) by mouth once daily. 60 tablet 3    montelukast (SINGULAIR) 10 mg tablet TAKE 1 TABLET BY MOUTH  DAILY 90 tablet 3    omega 3-dha-epa-fish oil 1,200 (144-216) mg Cap Take 1 capsule by mouth once daily.       ondansetron (ZOFRAN-ODT) 8 MG TbDL Take 1 tablet (8 mg total) by mouth every 8 (eight) hours as needed (nausea). 30 tablet 0    pantoprazole (PROTONIX) 20 MG tablet Take 1 tablet (20 mg total) by mouth once daily. 30 tablet 2    promethazine-codeine 6.25-10 mg/5 ml (PHENERGAN WITH CODEINE) 6.25-10  mg/5 mL syrup Take 5 mLs by mouth every 4 to 6 hours as needed for Cough. 240 mL 0    valACYclovir (VALTREX) 1000 MG tablet Take 1 tablet (1,000 mg total) by mouth 2 (two) times daily. 21 tablet 0    vitamin D 1000 units Tab Take 185 mg by mouth once daily. D3       No current facility-administered medications on file prior to visit.       Vitamins/Supplements/Herbs: see medication list  Vit D, omega 3 ; magnesium , calcium , Coenzyme Q10, Vitamin C   Labs  Lab Results   Component Value Date     (H) 10/26/2021    K 4.2 10/26/2021    PHOS 3.0 10/17/2021    MG 1.4 (L) 10/17/2021    CHOL 150 2020    HDL 71 2020    TRIG 104 2020    ALBUMIN 3.9 10/26/2021    PREALBUMIN 13 (L) 2017    AMMONIA 41 10/15/2011    HGBA1C 6.7 (H) 10/16/2021    CALCIUM 10.3 10/26/2021     Current Outpatient Medications   Medication Sig    ascorbic Acid (VITAMIN C) 500 mg CpSR Take 500 mg by mouth once daily.     atorvastatin (LIPITOR) 40 MG tablet TAKE 1 TABLET BY MOUTH  DAILY    azelastine (ASTELIN) 137 mcg (0.1 %) nasal spray 2 sprays (274 mcg total) by Nasal route 2 (two) times daily.    biotin 5,000 mcg TbDL Take 1 tablet by mouth once daily.     blood-glucose meter (CONTOUR METER) kit Please dispense [employer] Contour meter and supplies Use as instructed Test Blood sugar once daily    butalbital-acetaminophen-caffeine -40 mg (FIORICET, ESGIC) -40 mg per tablet SMARTSI Tablet(s) By Mouth 1 to 3 Times Daily PRN    cholestyramine-aspartame (CHOLESTYRAMINE LIGHT) 4 gram PwPk Take 1 packet (4 g total) by mouth daily as needed (Diarrhea).    clotrimazole-betamethasone 1-0.05% (LOTRISONE) cream Apply topically 2 (two) times daily.    co-enzyme Q-10 30 mg capsule Take 30 mg by mouth 3 (three) times daily.    conjugated estrogens (PREMARIN) vaginal cream INSERT 1/2 GRAM VAGINALLY  TWICE WEEKLY    cranberry extract 200 mg Cap Take 1 capsule by mouth once daily.     D-MANNOSE ORAL Take 1 tablet  by mouth once daily.     DULoxetine (CYMBALTA) 30 MG capsule Take 1 capsule (30 mg total) by mouth 2 (two) times daily.    flash glucose sensor (FREESTYLE EFREN 14 DAY SENSOR) Kit 1 application by Misc.(Non-Drug; Combo Route) route every 14 (fourteen) days. Disp 30 or 90 day refill    fluticasone (FLONASE) 50 mcg/actuation nasal spray 2 sprays by Each Nare route daily as needed.     FREESTYLE EFREN 10 DAY READER Misc TEST as directed    FREESTYLE LITE STRIPS Strp TEST DAILY AS DIRECTED    hydrocortisone 2.5 % cream Apply topically 2 (two) times daily as needed.    lancets (ONE TOUCH ULTRASOFT LANCETS) Misc Test blood sugar once daily    levothyroxine (SYNTHROID) 75 MCG tablet Take 1 tablet (75 mcg total) by mouth before breakfast.    magnesium 30 mg Tab Take 500 capsules by mouth.     mirabegron (MYRBETRIQ) 50 mg Tb24 Take 1 tablet (50 mg total) by mouth once daily.    montelukast (SINGULAIR) 10 mg tablet TAKE 1 TABLET BY MOUTH  DAILY    omega 3-dha-epa-fish oil 1,200 (144-216) mg Cap Take 1 capsule by mouth once daily.     ondansetron (ZOFRAN-ODT) 8 MG TbDL Take 1 tablet (8 mg total) by mouth every 8 (eight) hours as needed (nausea).    pantoprazole (PROTONIX) 20 MG tablet Take 1 tablet (20 mg total) by mouth once daily.    promethazine-codeine 6.25-10 mg/5 ml (PHENERGAN WITH CODEINE) 6.25-10 mg/5 mL syrup Take 5 mLs by mouth every 4 to 6 hours as needed for Cough.    valACYclovir (VALTREX) 1000 MG tablet Take 1 tablet (1,000 mg total) by mouth 2 (two) times daily.    vitamin D 1000 units Tab Take 185 mg by mouth once daily. D3     No current facility-administered medications for this visit.       Food intolerances or Allergies   Review of patient's allergies indicates:   Allergen Reactions    Dilaudid [hydromorphone (bulk)] Other (See Comments)     Oversedating, head burning. Pt prefers to avoid.       Codeine Nausea And Vomiting     Other reaction(s): Itching    Dilaudid [hydromorphone]      "Percocet  [oxycodone-acetaminophen]      Other reaction(s): Itching    Sulfa (sulfonamide antibiotics)      Other reaction(s): Nausea  Other reaction(s): Itching       Ht Readings from Last 3 Encounters:   12/21/21 5' 2" (1.575 m)   12/09/21 5' 2" (1.575 m)   11/23/21 5' 2" (1.575 m)     Wt Readings from Last 12 Encounters:   12/21/21 58.6 kg (129 lb 3 oz)   12/09/21 58.6 kg (129 lb 3.2 oz)   11/23/21 58.8 kg (129 lb 10.1 oz)   11/10/21 58.5 kg (128 lb 15.5 oz)   11/09/21 59.9 kg (132 lb)   10/27/21 58.5 kg (129 lb)   10/26/21 58.4 kg (128 lb 12 oz)   10/16/21 58.5 kg (129 lb)   10/15/21 58.8 kg (129 lb 10.1 oz)   10/08/21 60.2 kg (132 lb 11.5 oz)   10/07/21 60.1 kg (132 lb 7.9 oz)   08/11/21 (P) 58.7 kg (129 lb 6.6 oz)       Weight status: currently stable    Eating Behaviors: Daughter reports she consumes 60 oz fluid per day but notes mom is reticent to make the required trips to bathroom to urinate due to unsteadiness      GINUTHAB: Increased fluid losses ( vomiting/bowel) without adequate replacement , Inadequate fluid intake due to lack of thirst  and Preference for salty snack foods   Physical ability to self-feed, Remembers to eat, recalls eating and n/a - challenges - hand tremors, any meal prep - prefers sandwich to making meal but readily consumes meals brought by her daughters if supervised to make sure they are not delayed until end of day   Dysphagia score   0 - Able to eat normal diet / No dysphagia   Patient notes some dentition sensitivity or gum pain with raw foods     Nutrition History   1-2 meals daily  Meal patterns:   Breakfast: sourdough bread and peanut butter ; coffee   Lunch: fried foods  and sandwich from home   Dinner: prepared meals brought by daughters   Bowel Function :   have not significantly changedconstipation with stool leakage when patient urinates ; had pessary placed but has not been using due to rectal irritation   Meal preparation/shopping: family member, caregiver "     Nutrition beverages: mainly drinks water, lactose free milk , drinks caffeine       Dining out: Infrequent, 1-2 times per week  Smoking/alcohol:  Social History     Tobacco Use    Smoking status: Never Smoker    Smokeless tobacco: Never Used   Substance Use Topics    Alcohol use: Yes     Comment: socially, hardly ever

## 2021-12-28 NOTE — PROGRESS NOTES
NUTRITIONAL ASSESSMENT   Initial encounter 3/19   Referring Provider: Dr Betts/Li       Reason for Visit: Low Fodmap diet coordinated with DM guidelines and short bowel guidelines ( small bowel resection 2014 ) and colectomy 6/11    Hx IBS, fecal soiling with no apparent pattern in cause/effect with oral diet : tolerates salads without fecal urgency ; tolerates low fiber food ( rice, grits,white bread) with decrease in fecal urgency ; hx of frequent periods of dehydration ; does use electrolyte replacements ( G2 ) on occasion .      Hx : history of CRC, multiple admissions for SBO, IBS, microscopic colitis  Bowel movements,  5-6/day, generally starting formed in the morning and progressively more loose; pattern  for several years. She notes unpredictable  fecal incontinence which has been ongoing for years as well.  prior workup negative defecography and has attempted other treatments including PT without improvement. Dr. Erazo ([facility])briefly considered for interstim but appears to have been not further considered as incontinence improved with fiber ( fiber con in am and Metamucil in evening) .       2/4/2019  1:56 AM - [facility], [facility]      Component Value Flag Ref Range Units Status   Lipase 102 Abnormally high   H  4 - 60 U/L Final      2/1/2019  3:00 PM - [facility], [facility]      Component Value Flag Ref Range Units Status   Vitamin B-12 615    210 - 950 pg/mL Final      2/1/2019  2:48 PM - [facility], [facility]      Component Value Flag Ref Range Units Status   Magnesium 1.8    1.6 - 2.6 mg/dL Final          Usual Weight: 170# at age 58 years   Weight Change/Time: decline of 145#> 140# over the past year       Current Diet:   10 am Spelt toast and egg  Or oatmeal with walnuts, berries   2-3pm salad with meat or salad with chicken   4pm Grapes and strawberries   7-8 pm Cheese and raw vegetables or rice cake with fruit      Appetite/Current Intake: good   Exercise/Physical Activity: sedentary    Nutritional/Herbal Supplements: magnesium   Chewing/Swallowing Problems: none   Symptoms: fat intolerance ; fiber supplements and salads tolerated but high insoluble fiber foods ( strawberries, grapes, nuts ) trigger diarrhea ; processed cho - low fiber starches assist with firming of stool      Estimated Kcal Need: 3936-2820 calories ( MSJ X 1.2)   Estimated Protein Need: 50-60 grams      Nutritional History: Has always followed high fiber / whole grains to address DM and BG maintenance      Nutritional Diagnoses  Problem: altered GI function   Etiology: related to small bowel resection and colectomy   Symptoms: as evidenced by diarrhea      Educational Need? yes  Barriers: readiness to learn  Discussed with: patient  Interventions: Patient taught nutrition information regarding need for low fiber diet with additional fluid/electrolyte replacement post small bowel resection and colectomy -- discussed the role of soluble fiber ( firming of stool ) vs insoluble fiber ( increased transit ) ; separation of liquids form meals ; reduced fat and avoidance of high fodmap foods ; use of crock pot for cooking vegetables and making easier to digest discussed .   Goals/Recommendations:   Actions Taken: instruct/provide written information  [facility] GI nutrition ( Short Bowel syndrome; low Fodmap)   Patient and/or family comprehend instructions: yes  Outcome: Verbalizes understanding  Monitoring: trial of short bowel syndrome diet with fodmap modifications and tracking odf change in bowel patterns      Counseling Time: 90 minutes        GI NUTRITION FOLLOW-UP  Lengthy discussion with patient and caregiver Keya ( with her during day to transport to appts and to take on outings to grocery ,etc)   Lengthy discussion with daughter Wendy regarding medications ( timing , multiple vits) especially Cholestyramine which must be timed 4 hours before meals , separate from thyroid and vitamins , etc     Referring  "professional: Dr. Ramiro Jefferson  Reason for visit : low magnesium levels ; recent initiation of Cholestyramine ( patient travelled to [place] and decided to start medication upon return home to  but was hospitalized in [place] for possible bowel obstruction) . Attending MD recommended Cholestyramine be used .   Changes since last visit : 2019  Per discussion with daughter in [place]( Wendy)  who manages meds - daughters who live locally prepare meals and bring by for her . Daughter reports she will often consume first meal in late afternoon ; Keya reports good appetite but anxiety about health and mixed feelings about eating " it doesn't matter what I eat because my doctor says I have bad plumbing in my bowel"  Or " I am just going to eat what I want if it changes in diet don't make a difference ."  · Decline in short term memory - more imposed food restrictions ( no dairy) or medication adjustments(  trial of no metformin to see if culprit in diarrhea - increased BG so patient fearful of eating) or use of med which requires timing ( cholestyramine) more confusion results   · Hand tremors with eating but no spillage identified   · Increasing BG >200-300mg/dl since discontinuation of Metformin ( patient craving more sweets )checks BG 4 times per day   · Preferred food items - fried foods on outings, stews, cheese, yogurt   · Recent suggestion per Primary Care MD to eliminate dairy in diet - patient buying lactase free milk - confused about what to eat avoiding cheese and yogurt which have been staples ; daughter notes she does not consume liquid meal replacements as she prefers savory/salty foods    · Patient thinks she is not to have salt but reports dizziness, etc with hx of low BP .    Bowel function : patient describes as leakage "diarrhea"  when she urinates but daughter notes this to be leakage around blockage and constipation the overall issue .   Patient has tried the following dietary management changes " :    · Cooked vegetables, fruits, starches and avoidance of raw fruits and vegetables, nuts, etc.  · Inclusion of salads when she dines out   · Elimination of fried foods   · Metamucil , miralax , etc   · Clear liquids       Reason for contact:    : low magnesium levels       Age: 76 y.o.  Sex: female  Past Medical History:   Diagnosis Date    Abdominal pain     Allergic rhinitis     Arthritis     Blood platelet disorder     Blood platelet disorder     Blood transfusion     during delivery and     Bowel obstruction     Cervical radiculopathy     followed by dr cloud    Colon cancer     transverse colon; resected; Stage IIA (pT3 pN0 MX)    Diabetes mellitus     Diarrhea     Falls 2020    Family history of breast cancer     Family history of colon cancer     Fatty liver     GERD (gastroesophageal reflux disease)     History of shingles     Hyperlipidemia     Hypothyroidism     Irritable bowel syndrome     Microscopic colitis     treated     Raynaud phenomenon     Raynaud's disease     Type 2 diabetes mellitus      Past Surgical History:   Procedure Laterality Date    APPENDECTOMY      BACK SURGERY      CARPAL TUNNEL RELEASE      bilateral      SECTION      CHOLECYSTECTOMY  1965    COLECTOMY  2011    Transverse colon resection by Dr. Aguirre    COLONOSCOPY N/A 2017    Procedure: COLONOSCOPY;  Surgeon: Manjit Alvarez MD;  Location: [address]);  Service: Endoscopy;  Laterality: N/A;    COLONOSCOPY N/A 2019    Procedure: COLONOSCOPY;  Surgeon: Ramiro Jefferson MD;  Location: [address]);  Service: Endoscopy;  Laterality: N/A;  PM prep    CYSTOSCOPY N/A 2021    Procedure: CYSTOSCOPY;  Surgeon: Viridiana Valenzuela MD;  Location: [address];  Service: Urology;  Laterality: N/A;    ENDOSCOPIC ULTRASOUND OF UPPER GASTROINTESTINAL TRACT N/A 2018    Procedure: ULTRASOUND, UPPER GI TRACT, ENDOSCOPIC;  Surgeon: Jose MARTINEZ  MD Deshawn;  Location: [facility] ENDO;  Service: Endoscopy;  Laterality: N/A;    ENDOSCOPIC ULTRASOUND OF UPPER GASTROINTESTINAL TRACT N/A 2019    Procedure: ULTRASOUND, UPPER GI TRACT, ENDOSCOPIC;  Surgeon: Jose Hess MD;  Location: [facility] (2ND FLR);  Service: Endoscopy;  Laterality: N/A;    ESOPHAGOGASTRODUODENOSCOPY N/A 2018    Procedure: EGD (ESOPHAGOGASTRODUODENOSCOPY);  Surgeon: Angelo Reynolds MD;  Location: [facility] (2ND FLR);  Service: Endoscopy;  Laterality: N/A;    ESOPHAGOGASTRODUODENOSCOPY N/A 2019    Procedure: EGD (ESOPHAGOGASTRODUODENOSCOPY);  Surgeon: Ramiro Jefferson MD;  Location: [facility] (4TH FLR);  Service: Endoscopy;  Laterality: N/A;    EYE SURGERY      Cataract Removal    FLUOROSCOPIC URODYNAMIC STUDY N/A 2021    Procedure: URODYNAMIC STUDY, FLUOROSCOPIC;  Surgeon: Viridiana Valenzuela MD;  Location: [facility] OR 1ST FLR;  Service: Urology;  Laterality: N/A;  90 minutes     HYSTERECTOMY      posterolateral fusion with autograft bone and Eun mineralized bone matrix  13    at [facility] for lumbar spine stenosis    TOE SURGERY      TONSILLECTOMY      TRIGGER FINGER RELEASE       Pertinent Medications:   Current Outpatient Medications on File Prior to Visit   Medication Sig Dispense Refill    ascorbic Acid (VITAMIN C) 500 mg CpSR Take 500 mg by mouth once daily.       atorvastatin (LIPITOR) 40 MG tablet TAKE 1 TABLET BY MOUTH  DAILY 90 tablet 3    azelastine (ASTELIN) 137 mcg (0.1 %) nasal spray 2 sprays (274 mcg total) by Nasal route 2 (two) times daily. 30 mL 1    biotin 5,000 mcg TbDL Take 1 tablet by mouth once daily.       blood-glucose meter (CONTOUR METER) kit Please dispense Madie Contour meter and supplies Use as instructed Test Blood sugar once daily 1 each 0    butalbital-acetaminophen-caffeine -40 mg (FIORICET, ESGIC) -40 mg per tablet SMARTSI Tablet(s) By Mouth 1 to 3 Times Daily PRN      cholestyramine-aspartame  (CHOLESTYRAMINE LIGHT) 4 gram PwPk Take 1 packet (4 g total) by mouth daily as needed (Diarrhea). 90 packet 0    clotrimazole-betamethasone 1-0.05% (LOTRISONE) cream Apply topically 2 (two) times daily.      co-enzyme Q-10 30 mg capsule Take 30 mg by mouth 3 (three) times daily.      conjugated estrogens (PREMARIN) vaginal cream INSERT 1/2 GRAM VAGINALLY  TWICE WEEKLY 30 g 3    cranberry extract 200 mg Cap Take 1 capsule by mouth once daily.       D-MANNOSE ORAL Take 1 tablet by mouth once daily.       DULoxetine (CYMBALTA) 30 MG capsule Take 1 capsule (30 mg total) by mouth 2 (two) times daily. 180 capsule 3    flash glucose sensor (FREESTYLE EFREN 14 DAY SENSOR) Kit 1 application by Misc.(Non-Drug; Combo Route) route every 14 (fourteen) days. Disp 30 or 90 day refill 6 kit 6    fluticasone (FLONASE) 50 mcg/actuation nasal spray 2 sprays by Each Nare route daily as needed.   0    FREESTYLE EFREN 10 DAY READER Misc TEST as directed 3 each 3    FREESTYLE LITE STRIPS Strp TEST DAILY AS DIRECTED 50 strip 2    hydrocortisone 2.5 % cream Apply topically 2 (two) times daily as needed. 1 each 1    lancets (ONE TOUCH ULTRASOFT LANCETS) Misc Test blood sugar once daily 200 each 11    levothyroxine (SYNTHROID) 75 MCG tablet Take 1 tablet (75 mcg total) by mouth before breakfast. 90 tablet 3    magnesium 30 mg Tab Take 500 capsules by mouth.       mirabegron (MYRBETRIQ) 50 mg Tb24 Take 1 tablet (50 mg total) by mouth once daily. 60 tablet 3    montelukast (SINGULAIR) 10 mg tablet TAKE 1 TABLET BY MOUTH  DAILY 90 tablet 3    omega 3-dha-epa-fish oil 1,200 (144-216) mg Cap Take 1 capsule by mouth once daily.       ondansetron (ZOFRAN-ODT) 8 MG TbDL Take 1 tablet (8 mg total) by mouth every 8 (eight) hours as needed (nausea). 30 tablet 0    pantoprazole (PROTONIX) 20 MG tablet Take 1 tablet (20 mg total) by mouth once daily. 30 tablet 2    promethazine-codeine 6.25-10 mg/5 ml (PHENERGAN WITH CODEINE) 6.25-10  mg/5 mL syrup Take 5 mLs by mouth every 4 to 6 hours as needed for Cough. 240 mL 0    valACYclovir (VALTREX) 1000 MG tablet Take 1 tablet (1,000 mg total) by mouth 2 (two) times daily. 21 tablet 0    vitamin D 1000 units Tab Take 185 mg by mouth once daily. D3       No current facility-administered medications on file prior to visit.       Vitamins/Supplements/Herbs: see medication list  Vit D, omega 3 ; magnesium , calcium , Coenzyme Q10, Vitamin C   Labs  Lab Results   Component Value Date     (H) 10/26/2021    K 4.2 10/26/2021    PHOS 3.0 10/17/2021    MG 1.4 (L) 10/17/2021    CHOL 150 2020    HDL 71 2020    TRIG 104 2020    ALBUMIN 3.9 10/26/2021    PREALBUMIN 13 (L) 2017    AMMONIA 41 10/15/2011    HGBA1C 6.7 (H) 10/16/2021    CALCIUM 10.3 10/26/2021     Current Outpatient Medications   Medication Sig    ascorbic Acid (VITAMIN C) 500 mg CpSR Take 500 mg by mouth once daily.     atorvastatin (LIPITOR) 40 MG tablet TAKE 1 TABLET BY MOUTH  DAILY    azelastine (ASTELIN) 137 mcg (0.1 %) nasal spray 2 sprays (274 mcg total) by Nasal route 2 (two) times daily.    biotin 5,000 mcg TbDL Take 1 tablet by mouth once daily.     blood-glucose meter (CONTOUR METER) kit Please dispense [employer] Contour meter and supplies Use as instructed Test Blood sugar once daily    butalbital-acetaminophen-caffeine -40 mg (FIORICET, ESGIC) -40 mg per tablet SMARTSI Tablet(s) By Mouth 1 to 3 Times Daily PRN    cholestyramine-aspartame (CHOLESTYRAMINE LIGHT) 4 gram PwPk Take 1 packet (4 g total) by mouth daily as needed (Diarrhea).    clotrimazole-betamethasone 1-0.05% (LOTRISONE) cream Apply topically 2 (two) times daily.    co-enzyme Q-10 30 mg capsule Take 30 mg by mouth 3 (three) times daily.    conjugated estrogens (PREMARIN) vaginal cream INSERT 1/2 GRAM VAGINALLY  TWICE WEEKLY    cranberry extract 200 mg Cap Take 1 capsule by mouth once daily.     D-MANNOSE ORAL Take 1 tablet  by mouth once daily.     DULoxetine (CYMBALTA) 30 MG capsule Take 1 capsule (30 mg total) by mouth 2 (two) times daily.    flash glucose sensor (FREESTYLE EFREN 14 DAY SENSOR) Kit 1 application by Misc.(Non-Drug; Combo Route) route every 14 (fourteen) days. Disp 30 or 90 day refill    fluticasone (FLONASE) 50 mcg/actuation nasal spray 2 sprays by Each Nare route daily as needed.     FREESTYLE EFREN 10 DAY READER Misc TEST as directed    FREESTYLE LITE STRIPS Strp TEST DAILY AS DIRECTED    hydrocortisone 2.5 % cream Apply topically 2 (two) times daily as needed.    lancets (ONE TOUCH ULTRASOFT LANCETS) Misc Test blood sugar once daily    levothyroxine (SYNTHROID) 75 MCG tablet Take 1 tablet (75 mcg total) by mouth before breakfast.    magnesium 30 mg Tab Take 500 capsules by mouth.     mirabegron (MYRBETRIQ) 50 mg Tb24 Take 1 tablet (50 mg total) by mouth once daily.    montelukast (SINGULAIR) 10 mg tablet TAKE 1 TABLET BY MOUTH  DAILY    omega 3-dha-epa-fish oil 1,200 (144-216) mg Cap Take 1 capsule by mouth once daily.     ondansetron (ZOFRAN-ODT) 8 MG TbDL Take 1 tablet (8 mg total) by mouth every 8 (eight) hours as needed (nausea).    pantoprazole (PROTONIX) 20 MG tablet Take 1 tablet (20 mg total) by mouth once daily.    promethazine-codeine 6.25-10 mg/5 ml (PHENERGAN WITH CODEINE) 6.25-10 mg/5 mL syrup Take 5 mLs by mouth every 4 to 6 hours as needed for Cough.    valACYclovir (VALTREX) 1000 MG tablet Take 1 tablet (1,000 mg total) by mouth 2 (two) times daily.    vitamin D 1000 units Tab Take 185 mg by mouth once daily. D3     No current facility-administered medications for this visit.       Food intolerances or Allergies   Review of patient's allergies indicates:   Allergen Reactions    Dilaudid [hydromorphone (bulk)] Other (See Comments)     Oversedating, head burning. Pt prefers to avoid.       Codeine Nausea And Vomiting     Other reaction(s): Itching    Dilaudid [hydromorphone]      "Percocet  [oxycodone-acetaminophen]      Other reaction(s): Itching    Sulfa (sulfonamide antibiotics)      Other reaction(s): Nausea  Other reaction(s): Itching       Ht Readings from Last 3 Encounters:   12/21/21 5' 2" (1.575 m)   12/09/21 5' 2" (1.575 m)   11/23/21 5' 2" (1.575 m)     Wt Readings from Last 12 Encounters:   12/21/21 58.6 kg (129 lb 3 oz)   12/09/21 58.6 kg (129 lb 3.2 oz)   11/23/21 58.8 kg (129 lb 10.1 oz)   11/10/21 58.5 kg (128 lb 15.5 oz)   11/09/21 59.9 kg (132 lb)   10/27/21 58.5 kg (129 lb)   10/26/21 58.4 kg (128 lb 12 oz)   10/16/21 58.5 kg (129 lb)   10/15/21 58.8 kg (129 lb 10.1 oz)   10/08/21 60.2 kg (132 lb 11.5 oz)   10/07/21 60.1 kg (132 lb 7.9 oz)   08/11/21 (P) 58.7 kg (129 lb 6.6 oz)       Weight status: currently stable    Eating Behaviors: Daughter reports she consumes 60 oz fluid per day but notes mom is reticent to make the required trips to bathroom to urinate due to unsteadiness      GINUTHAB: Increased fluid losses ( vomiting/bowel) without adequate replacement , Inadequate fluid intake due to lack of thirst  and Preference for salty snack foods   Physical ability to self-feed, Remembers to eat, recalls eating and n/a - challenges - hand tremors, any meal prep - prefers sandwich to making meal but readily consumes meals brought by her daughters if supervised to make sure they are not delayed until end of day   Dysphagia score   0 - Able to eat normal diet / No dysphagia   Patient notes some dentition sensitivity or gum pain with raw foods     Nutrition History   1-2 meals daily  Meal patterns:   Breakfast: sourdough bread and peanut butter ; coffee   Lunch: fried foods  and sandwich from home   Dinner: prepared meals brought by daughters   Bowel Function :   have not significantly changedconstipation with stool leakage when patient urinates ; had pessary placed but has not been using due to rectal irritation   Meal preparation/shopping: family member, caregiver "     Nutrition beverages: mainly drinks water, lactose free milk , drinks caffeine       Dining out: Infrequent, 1-2 times per week  Smoking/alcohol:  Social History     Tobacco Use    Smoking status: Never Smoker    Smokeless tobacco: Never Used   Substance Use Topics    Alcohol use: Yes     Comment: socially, hardly ever       Nutrition Diagnosis:   Altered GI function related to Irritable bowel syndrome with both constipation and diarrhea, Atrophic pancreas, History of partial colectomy; long-term proton pump inhibitor therapy, Status post cholecystectomy, History of adenomatous polyp of colon with frequent hospitalizations for blockages and no reported improvement with alteration in diet     Short term memory changes which impact meal consumption; timing of meds     Altered BG related to recent holding of metformin and increase in BG as evidenced by patient report of BG in 200's     IBS /hx intestinal blockages s/p cholecystectomy with patient preference for fried , high fat foods and recent recommendation for Cholestyramine ( delayed trial due to travel and [place] hospitalization )   Motivation to change: low    MNT Recommendations/Interventions/Goals:   Low magnesium    Low magnesium is typically due to decreased absorption of magnesium in the gut or increased excretion of magnesium in the urine. Low magnesium levels in otherwise healthy people are uncommon. This is because magnesium levels are largely controlled by the kidneys. The kidneys increase or decrease excretion (waste) of magnesium based on what the body needs.    Continually low dietary intake of magnesium, excessive loss of magnesium, or the presence of other chronic conditions can lead to hypomagnesemia.    Conditions that increase the risk of magnesium deficiency include gastrointestinal (GI) diseases, advanced age, type 2 diabetes, use of loop diuretics (such as Lasix), treatment with certain chemotherapies, and alcohol dependence.    GI  diseases  Celiac disease, Crohns disease, and chronic diarrhea can impair the absorption of magnesium or result in increased magnesium loss.    Type 2 diabetes  Higher concentrations of blood glucose can cause the kidneys to excrete more urine. This also causes increased loss of magnesium.    Older adults  Gut absorption of magnesium tends to decrease with age. Urinary output of magnesium tends to increase with age. Older adults often eat fewer magnesium-rich foods. Theyre also more likely to take medication that can affect magnesium (such as diuretics). These factors can lead to hypomagnesemia in older adults.  · Nutrition sources : Magnesium sources   · Legumes: black beans, edamame  · Vegetables: spinach, kale, avocado  · Nuts: almonds, peanuts, cashews  · Whole grains: oatmeal, whole wheat  · Others: dark chocolate       Patient consumes chocolate but little if any of others unless she consumes an occasional salad   MNT Recommendations/Interventions/Goals:   · Trial of digestive enzymes ?  ·   · Welchol vs Cholestyramine relative to timing   · Oral liquid supplement was discussed (OWYN) dairy free plant based - no info on magnesium content but it contains                                                    [email or web address]/    Discussion with patient/sister included: above     Patient demonstrated understanding: caregiver took notes; discussion with sister     Nutrition follow-up:  will follow up per referral from medical provider     Counseling time: 2 Hours

## 2022-01-01 ENCOUNTER — HOSPITAL ENCOUNTER (INPATIENT)
Facility: HOSPITAL | Age: 77
LOS: 1 days | Discharge: HOME | DRG: 392 | End: 2022-01-04
Attending: EMERGENCY MEDICINE | Admitting: SURGERY
Payer: MEDICARE

## 2022-01-01 DIAGNOSIS — K56.609 SBO (SMALL BOWEL OBSTRUCTION): Primary | ICD-10-CM

## 2022-01-01 DIAGNOSIS — M25.522 LEFT ELBOW PAIN: ICD-10-CM

## 2022-01-01 DIAGNOSIS — R00.0 TACHYCARDIA: ICD-10-CM

## 2022-01-01 LAB
ALBUMIN SERPL BCP-MCNC: 3.8 G/DL (ref 3.5–5.2)
ALP SERPL-CCNC: 97 U/L (ref 55–135)
ALT SERPL W/O P-5'-P-CCNC: 40 U/L (ref 10–44)
ANION GAP SERPL CALC-SCNC: 11 MMOL/L (ref 8–16)
AST SERPL-CCNC: 51 U/L (ref 10–40)
BASOPHILS # BLD AUTO: 0.03 K/UL (ref 0–0.2)
BASOPHILS NFR BLD: 0.4 % (ref 0–1.9)
BILIRUB SERPL-MCNC: 1.4 MG/DL (ref 0.1–1)
BILIRUB UR QL STRIP: NEGATIVE
BUN SERPL-MCNC: 12 MG/DL (ref 8–23)
CALCIUM SERPL-MCNC: 9.3 MG/DL (ref 8.7–10.5)
CHLORIDE SERPL-SCNC: 104 MMOL/L (ref 95–110)
CLARITY UR REFRACT.AUTO: CLEAR
CO2 SERPL-SCNC: 23 MMOL/L (ref 23–29)
COLOR UR AUTO: YELLOW
CREAT SERPL-MCNC: 0.8 MG/DL (ref 0.5–1.4)
CTP QC/QA: YES
DIFFERENTIAL METHOD: ABNORMAL
EOSINOPHIL # BLD AUTO: 0.2 K/UL (ref 0–0.5)
EOSINOPHIL NFR BLD: 1.9 % (ref 0–8)
ERYTHROCYTE [DISTWIDTH] IN BLOOD BY AUTOMATED COUNT: 14.2 % (ref 11.5–14.5)
EST. GFR  (AFRICAN AMERICAN): >60 ML/MIN/1.73 M^2
EST. GFR  (NON AFRICAN AMERICAN): >60 ML/MIN/1.73 M^2
GLUCOSE SERPL-MCNC: 179 MG/DL (ref 70–110)
GLUCOSE UR QL STRIP: NEGATIVE
HCT VFR BLD AUTO: 36.1 % (ref 37–48.5)
HGB BLD-MCNC: 11.5 G/DL (ref 12–16)
HGB UR QL STRIP: NEGATIVE
IMM GRANULOCYTES # BLD AUTO: 0.01 K/UL (ref 0–0.04)
IMM GRANULOCYTES NFR BLD AUTO: 0.1 % (ref 0–0.5)
KETONES UR QL STRIP: NEGATIVE
LACTATE SERPL-SCNC: 1.5 MMOL/L (ref 0.5–2.2)
LEUKOCYTE ESTERASE UR QL STRIP: ABNORMAL
LIPASE SERPL-CCNC: 13 U/L (ref 4–60)
LYMPHOCYTES # BLD AUTO: 1.2 K/UL (ref 1–4.8)
LYMPHOCYTES NFR BLD: 14.5 % (ref 18–48)
MCH RBC QN AUTO: 32.5 PG (ref 27–31)
MCHC RBC AUTO-ENTMCNC: 31.9 G/DL (ref 32–36)
MCV RBC AUTO: 102 FL (ref 82–98)
MICROSCOPIC COMMENT: ABNORMAL
MONOCYTES # BLD AUTO: 0.7 K/UL (ref 0.3–1)
MONOCYTES NFR BLD: 8.7 % (ref 4–15)
NEUTROPHILS # BLD AUTO: 6.4 K/UL (ref 1.8–7.7)
NEUTROPHILS NFR BLD: 74.4 % (ref 38–73)
NITRITE UR QL STRIP: NEGATIVE
NRBC BLD-RTO: 0 /100 WBC
PH UR STRIP: 6 [PH] (ref 5–8)
PLATELET # BLD AUTO: 84 K/UL (ref 150–450)
PMV BLD AUTO: 10.9 FL (ref 9.2–12.9)
POTASSIUM SERPL-SCNC: 4.7 MMOL/L (ref 3.5–5.1)
PROT SERPL-MCNC: 7.2 G/DL (ref 6–8.4)
PROT UR QL STRIP: NEGATIVE
RBC # BLD AUTO: 3.54 M/UL (ref 4–5.4)
RBC #/AREA URNS AUTO: 1 /HPF (ref 0–4)
SARS-COV-2 RDRP RESP QL NAA+PROBE: NEGATIVE
SODIUM SERPL-SCNC: 138 MMOL/L (ref 136–145)
SP GR UR STRIP: 1.01 (ref 1–1.03)
SQUAMOUS #/AREA URNS AUTO: 1 /HPF
URN SPEC COLLECT METH UR: ABNORMAL
WBC # BLD AUTO: 8.55 K/UL (ref 3.9–12.7)
WBC #/AREA URNS AUTO: 8 /HPF (ref 0–5)

## 2022-01-01 PROCEDURE — 83690 ASSAY OF LIPASE: CPT | Performed by: EMERGENCY MEDICINE

## 2022-01-01 PROCEDURE — 96374 THER/PROPH/DIAG INJ IV PUSH: CPT

## 2022-01-01 PROCEDURE — 99285 PR EMERGENCY DEPT VISIT,LEVEL V: ICD-10-PCS | Mod: CS,,, | Performed by: EMERGENCY MEDICINE

## 2022-01-01 PROCEDURE — 85025 COMPLETE CBC W/AUTO DIFF WBC: CPT | Performed by: EMERGENCY MEDICINE

## 2022-01-01 PROCEDURE — U0002 COVID-19 LAB TEST NON-CDC: HCPCS | Performed by: STUDENT IN AN ORGANIZED HEALTH CARE EDUCATION/TRAINING PROGRAM

## 2022-01-01 PROCEDURE — 93005 ELECTROCARDIOGRAM TRACING: CPT

## 2022-01-01 PROCEDURE — 80053 COMPREHEN METABOLIC PANEL: CPT | Performed by: EMERGENCY MEDICINE

## 2022-01-01 PROCEDURE — 96361 HYDRATE IV INFUSION ADD-ON: CPT

## 2022-01-01 PROCEDURE — 93010 ELECTROCARDIOGRAM REPORT: CPT | Mod: ,,, | Performed by: INTERNAL MEDICINE

## 2022-01-01 PROCEDURE — 63600175 PHARM REV CODE 636 W HCPCS: Performed by: STUDENT IN AN ORGANIZED HEALTH CARE EDUCATION/TRAINING PROGRAM

## 2022-01-01 PROCEDURE — 83605 ASSAY OF LACTIC ACID: CPT | Performed by: STUDENT IN AN ORGANIZED HEALTH CARE EDUCATION/TRAINING PROGRAM

## 2022-01-01 PROCEDURE — 93010 EKG 12-LEAD: ICD-10-PCS | Mod: ,,, | Performed by: INTERNAL MEDICINE

## 2022-01-01 PROCEDURE — 81001 URINALYSIS AUTO W/SCOPE: CPT | Performed by: EMERGENCY MEDICINE

## 2022-01-01 PROCEDURE — 96375 TX/PRO/DX INJ NEW DRUG ADDON: CPT

## 2022-01-01 PROCEDURE — 99285 EMERGENCY DEPT VISIT HI MDM: CPT | Mod: 25

## 2022-01-01 PROCEDURE — 99285 EMERGENCY DEPT VISIT HI MDM: CPT | Mod: CS,,, | Performed by: EMERGENCY MEDICINE

## 2022-01-01 RX ORDER — MORPHINE SULFATE 2 MG/ML
2 INJECTION, SOLUTION INTRAMUSCULAR; INTRAVENOUS
Status: COMPLETED | OUTPATIENT
Start: 2022-01-01 | End: 2022-01-01

## 2022-01-01 RX ORDER — ONDANSETRON 2 MG/ML
4 INJECTION INTRAMUSCULAR; INTRAVENOUS ONCE
Status: COMPLETED | OUTPATIENT
Start: 2022-01-01 | End: 2022-01-01

## 2022-01-01 RX ADMIN — ONDANSETRON 4 MG: 2 INJECTION INTRAMUSCULAR; INTRAVENOUS at 10:01

## 2022-01-01 RX ADMIN — MORPHINE SULFATE 2 MG: 2 INJECTION, SOLUTION INTRAMUSCULAR; INTRAVENOUS at 10:01

## 2022-01-02 LAB
ALBUMIN SERPL BCP-MCNC: 3 G/DL (ref 3.5–5.2)
ALP SERPL-CCNC: 82 U/L (ref 55–135)
ALT SERPL W/O P-5'-P-CCNC: 40 U/L (ref 10–44)
ANION GAP SERPL CALC-SCNC: 6 MMOL/L (ref 8–16)
AST SERPL-CCNC: 50 U/L (ref 10–40)
BASOPHILS # BLD AUTO: 0.02 K/UL (ref 0–0.2)
BASOPHILS NFR BLD: 0.4 % (ref 0–1.9)
BILIRUB SERPL-MCNC: 1 MG/DL (ref 0.1–1)
BUN SERPL-MCNC: 9 MG/DL (ref 8–23)
CALCIUM SERPL-MCNC: 8.3 MG/DL (ref 8.7–10.5)
CHLORIDE SERPL-SCNC: 109 MMOL/L (ref 95–110)
CO2 SERPL-SCNC: 23 MMOL/L (ref 23–29)
CREAT SERPL-MCNC: 0.7 MG/DL (ref 0.5–1.4)
DIFFERENTIAL METHOD: ABNORMAL
EOSINOPHIL # BLD AUTO: 0.2 K/UL (ref 0–0.5)
EOSINOPHIL NFR BLD: 4 % (ref 0–8)
ERYTHROCYTE [DISTWIDTH] IN BLOOD BY AUTOMATED COUNT: 13.9 % (ref 11.5–14.5)
EST. GFR  (AFRICAN AMERICAN): >60 ML/MIN/1.73 M^2
EST. GFR  (NON AFRICAN AMERICAN): >60 ML/MIN/1.73 M^2
GLUCOSE SERPL-MCNC: 154 MG/DL (ref 70–110)
HCT VFR BLD AUTO: 31 % (ref 37–48.5)
HGB BLD-MCNC: 10.1 G/DL (ref 12–16)
IMM GRANULOCYTES # BLD AUTO: 0 K/UL (ref 0–0.04)
IMM GRANULOCYTES NFR BLD AUTO: 0 % (ref 0–0.5)
LYMPHOCYTES # BLD AUTO: 1.3 K/UL (ref 1–4.8)
LYMPHOCYTES NFR BLD: 29.2 % (ref 18–48)
MAGNESIUM SERPL-MCNC: 1.7 MG/DL (ref 1.6–2.6)
MCH RBC QN AUTO: 32.6 PG (ref 27–31)
MCHC RBC AUTO-ENTMCNC: 32.6 G/DL (ref 32–36)
MCV RBC AUTO: 100 FL (ref 82–98)
MONOCYTES # BLD AUTO: 0.5 K/UL (ref 0.3–1)
MONOCYTES NFR BLD: 10.6 % (ref 4–15)
NEUTROPHILS # BLD AUTO: 2.5 K/UL (ref 1.8–7.7)
NEUTROPHILS NFR BLD: 55.8 % (ref 38–73)
NRBC BLD-RTO: 0 /100 WBC
PHOSPHATE SERPL-MCNC: 3.1 MG/DL (ref 2.7–4.5)
PLATELET # BLD AUTO: 63 K/UL (ref 150–450)
PMV BLD AUTO: 10.7 FL (ref 9.2–12.9)
POTASSIUM SERPL-SCNC: 4.1 MMOL/L (ref 3.5–5.1)
PROT SERPL-MCNC: 5.5 G/DL (ref 6–8.4)
RBC # BLD AUTO: 3.1 M/UL (ref 4–5.4)
SODIUM SERPL-SCNC: 138 MMOL/L (ref 136–145)
WBC # BLD AUTO: 4.45 K/UL (ref 3.9–12.7)

## 2022-01-02 PROCEDURE — A4216 STERILE WATER/SALINE, 10 ML: HCPCS | Performed by: STUDENT IN AN ORGANIZED HEALTH CARE EDUCATION/TRAINING PROGRAM

## 2022-01-02 PROCEDURE — 83735 ASSAY OF MAGNESIUM: CPT | Performed by: STUDENT IN AN ORGANIZED HEALTH CARE EDUCATION/TRAINING PROGRAM

## 2022-01-02 PROCEDURE — 94761 N-INVAS EAR/PLS OXIMETRY MLT: CPT

## 2022-01-02 PROCEDURE — 84100 ASSAY OF PHOSPHORUS: CPT | Performed by: STUDENT IN AN ORGANIZED HEALTH CARE EDUCATION/TRAINING PROGRAM

## 2022-01-02 PROCEDURE — G0378 HOSPITAL OBSERVATION PER HR: HCPCS

## 2022-01-02 PROCEDURE — 85025 COMPLETE CBC W/AUTO DIFF WBC: CPT | Performed by: STUDENT IN AN ORGANIZED HEALTH CARE EDUCATION/TRAINING PROGRAM

## 2022-01-02 PROCEDURE — 63600175 PHARM REV CODE 636 W HCPCS: Performed by: EMERGENCY MEDICINE

## 2022-01-02 PROCEDURE — 63600175 PHARM REV CODE 636 W HCPCS: Performed by: STUDENT IN AN ORGANIZED HEALTH CARE EDUCATION/TRAINING PROGRAM

## 2022-01-02 PROCEDURE — 80053 COMPREHEN METABOLIC PANEL: CPT | Performed by: STUDENT IN AN ORGANIZED HEALTH CARE EDUCATION/TRAINING PROGRAM

## 2022-01-02 PROCEDURE — 25000003 PHARM REV CODE 250: Performed by: STUDENT IN AN ORGANIZED HEALTH CARE EDUCATION/TRAINING PROGRAM

## 2022-01-02 PROCEDURE — 25500020 PHARM REV CODE 255: Performed by: EMERGENCY MEDICINE

## 2022-01-02 RX ORDER — HYDROCODONE BITARTRATE AND ACETAMINOPHEN 5; 325 MG/1; MG/1
1 TABLET ORAL EVERY 4 HOURS PRN
Status: DISCONTINUED | OUTPATIENT
Start: 2022-01-02 | End: 2022-01-04 | Stop reason: HOSPADM

## 2022-01-02 RX ORDER — SODIUM CHLORIDE, SODIUM LACTATE, POTASSIUM CHLORIDE, CALCIUM CHLORIDE 600; 310; 30; 20 MG/100ML; MG/100ML; MG/100ML; MG/100ML
INJECTION, SOLUTION INTRAVENOUS CONTINUOUS
Status: DISCONTINUED | OUTPATIENT
Start: 2022-01-02 | End: 2022-01-04

## 2022-01-02 RX ORDER — LEVOTHYROXINE SODIUM 75 UG/1
75 TABLET ORAL
Status: DISCONTINUED | OUTPATIENT
Start: 2022-01-02 | End: 2022-01-04 | Stop reason: HOSPADM

## 2022-01-02 RX ORDER — ACETAMINOPHEN 325 MG/1
650 TABLET ORAL EVERY 4 HOURS PRN
Status: DISCONTINUED | OUTPATIENT
Start: 2022-01-02 | End: 2022-01-04 | Stop reason: HOSPADM

## 2022-01-02 RX ORDER — MONTELUKAST SODIUM 10 MG/1
10 TABLET ORAL DAILY
Status: DISCONTINUED | OUTPATIENT
Start: 2022-01-02 | End: 2022-01-04 | Stop reason: HOSPADM

## 2022-01-02 RX ORDER — VALACYCLOVIR HYDROCHLORIDE 500 MG/1
1000 TABLET, FILM COATED ORAL 2 TIMES DAILY
Status: DISCONTINUED | OUTPATIENT
Start: 2022-01-02 | End: 2022-01-04 | Stop reason: HOSPADM

## 2022-01-02 RX ORDER — SODIUM CHLORIDE 0.9 % (FLUSH) 0.9 %
3 SYRINGE (ML) INJECTION EVERY 8 HOURS
Status: DISCONTINUED | OUTPATIENT
Start: 2022-01-02 | End: 2022-01-04 | Stop reason: HOSPADM

## 2022-01-02 RX ORDER — BUTALBITAL, ACETAMINOPHEN AND CAFFEINE 50; 325; 40 MG/1; MG/1; MG/1
1 TABLET ORAL EVERY 4 HOURS PRN
Status: DISCONTINUED | OUTPATIENT
Start: 2022-01-02 | End: 2022-01-04 | Stop reason: HOSPADM

## 2022-01-02 RX ORDER — ATORVASTATIN CALCIUM 20 MG/1
40 TABLET, FILM COATED ORAL DAILY
Status: DISCONTINUED | OUTPATIENT
Start: 2022-01-02 | End: 2022-01-04 | Stop reason: HOSPADM

## 2022-01-02 RX ORDER — MORPHINE SULFATE 2 MG/ML
2 INJECTION, SOLUTION INTRAMUSCULAR; INTRAVENOUS
Status: COMPLETED | OUTPATIENT
Start: 2022-01-02 | End: 2022-01-02

## 2022-01-02 RX ORDER — PANTOPRAZOLE SODIUM 20 MG/1
20 TABLET, DELAYED RELEASE ORAL DAILY
Status: DISCONTINUED | OUTPATIENT
Start: 2022-01-02 | End: 2022-01-04 | Stop reason: HOSPADM

## 2022-01-02 RX ORDER — DULOXETIN HYDROCHLORIDE 30 MG/1
30 CAPSULE, DELAYED RELEASE ORAL 2 TIMES DAILY
Status: DISCONTINUED | OUTPATIENT
Start: 2022-01-02 | End: 2022-01-04 | Stop reason: HOSPADM

## 2022-01-02 RX ORDER — ONDANSETRON 2 MG/ML
4 INJECTION INTRAMUSCULAR; INTRAVENOUS EVERY 6 HOURS PRN
Status: DISCONTINUED | OUTPATIENT
Start: 2022-01-02 | End: 2022-01-04 | Stop reason: HOSPADM

## 2022-01-02 RX ADMIN — DULOXETINE 30 MG: 30 CAPSULE, DELAYED RELEASE ORAL at 09:01

## 2022-01-02 RX ADMIN — HYDROCODONE BITARTRATE AND ACETAMINOPHEN 1 TABLET: 5; 325 TABLET ORAL at 02:01

## 2022-01-02 RX ADMIN — MORPHINE SULFATE 2 MG: 2 INJECTION, SOLUTION INTRAMUSCULAR; INTRAVENOUS at 06:01

## 2022-01-02 RX ADMIN — Medication 3 ML: at 10:01

## 2022-01-02 RX ADMIN — ATORVASTATIN CALCIUM 40 MG: 20 TABLET, FILM COATED ORAL at 08:01

## 2022-01-02 RX ADMIN — ONDANSETRON 4 MG: 2 INJECTION INTRAMUSCULAR; INTRAVENOUS at 02:01

## 2022-01-02 RX ADMIN — VALACYCLOVIR HYDROCHLORIDE 1000 MG: 500 TABLET, FILM COATED ORAL at 08:01

## 2022-01-02 RX ADMIN — Medication 3 ML: at 05:01

## 2022-01-02 RX ADMIN — SODIUM CHLORIDE, SODIUM LACTATE, POTASSIUM CHLORIDE, AND CALCIUM CHLORIDE: 600; 310; 30; 20 INJECTION, SOLUTION INTRAVENOUS at 05:01

## 2022-01-02 RX ADMIN — IOHEXOL 75 ML: 350 INJECTION, SOLUTION INTRAVENOUS at 01:01

## 2022-01-02 RX ADMIN — PANTOPRAZOLE SODIUM 20 MG: 20 TABLET, DELAYED RELEASE ORAL at 08:01

## 2022-01-02 RX ADMIN — MONTELUKAST 10 MG: 10 TABLET, FILM COATED ORAL at 08:01

## 2022-01-02 RX ADMIN — Medication 1 ENEMA: at 11:01

## 2022-01-02 RX ADMIN — VALACYCLOVIR HYDROCHLORIDE 1000 MG: 500 TABLET, FILM COATED ORAL at 09:01

## 2022-01-02 RX ADMIN — LEVOTHYROXINE SODIUM 75 MCG: 75 TABLET ORAL at 05:01

## 2022-01-02 RX ADMIN — DULOXETINE 30 MG: 30 CAPSULE, DELAYED RELEASE ORAL at 08:01

## 2022-01-02 NOTE — ED PROVIDER NOTES
"Encounter Date: 2022       History     Chief Complaint   Patient presents with    Abdominal Pain     Pt reports abdominal pain this evening. +N/V, LBM tonight, reports it as small pieces. Hx of bowel obstruction.     HPI     76 year old female presents for acute exacerbation of chronic periumbilical and left upper quadrant abdominal pain, worsening today, associated with nausea but no vomiting, in addition to 5 weeks of loose bowel movements, including one tonight that looked like "loose pieces" of bowel. This feels like prior episodes of bowel obstruction. She denies fevers or chills.     She also reports intermittent confusion over the past week after falling from bed at night during a dream, striking her head on the ground, along with left elbow pain from the same incident.     Review of patient's allergies indicates:   Allergen Reactions    Dilaudid [hydromorphone (bulk)] Other (See Comments)     Oversedating, head burning. Pt prefers to avoid.       Codeine Nausea And Vomiting     Other reaction(s): Itching    Dilaudid [hydromorphone]     Percocet  [oxycodone-acetaminophen]      Other reaction(s): Itching    Sulfa (sulfonamide antibiotics)      Other reaction(s): Nausea  Other reaction(s): Itching     Past Medical History:   Diagnosis Date    Abdominal pain     Allergic rhinitis     Arthritis     Blood platelet disorder     Blood platelet disorder     Blood transfusion     during delivery and     Bowel obstruction     Cervical radiculopathy     followed by dr cloud    Colon cancer     transverse colon; resected; Stage IIA (pT3 pN0 MX)    Diabetes mellitus     Diarrhea     Falls 2020    Family history of breast cancer     Family history of colon cancer     Fatty liver     GERD (gastroesophageal reflux disease)     History of shingles     Hyperlipidemia     Hypothyroidism     Irritable bowel syndrome     Microscopic colitis     treated 2013    Raynaud phenomenon  "    Raynaud's disease     Type 2 diabetes mellitus      Past Surgical History:   Procedure Laterality Date    APPENDECTOMY      BACK SURGERY      CARPAL TUNNEL RELEASE      bilateral      SECTION      CHOLECYSTECTOMY  1965    COLECTOMY  2011    Transverse colon resection by Dr. Aguirre    COLONOSCOPY N/A 2017    Procedure: COLONOSCOPY;  Surgeon: Manjit Alvarez MD;  Location: [facility] (4TH FLR);  Service: Endoscopy;  Laterality: N/A;    COLONOSCOPY N/A 2019    Procedure: COLONOSCOPY;  Surgeon: Ramiro Jefferson MD;  Location: [facility] (4TH FLR);  Service: Endoscopy;  Laterality: N/A;  PM prep    CYSTOSCOPY N/A 2021    Procedure: CYSTOSCOPY;  Surgeon: Viridiana Valenzuela MD;  Location: [facility] OR 1ST FLR;  Service: Urology;  Laterality: N/A;    ENDOSCOPIC ULTRASOUND OF UPPER GASTROINTESTINAL TRACT N/A 2018    Procedure: ULTRASOUND, UPPER GI TRACT, ENDOSCOPIC;  Surgeon: Jose Hess MD;  Location: [facility];  Service: Endoscopy;  Laterality: N/A;    ENDOSCOPIC ULTRASOUND OF UPPER GASTROINTESTINAL TRACT N/A 2019    Procedure: ULTRASOUND, UPPER GI TRACT, ENDOSCOPIC;  Surgeon: Jose Hess MD;  Location: [facility] (2ND FLR);  Service: Endoscopy;  Laterality: N/A;    ESOPHAGOGASTRODUODENOSCOPY N/A 2018    Procedure: EGD (ESOPHAGOGASTRODUODENOSCOPY);  Surgeon: Angelo Reynolds MD;  Location: [facility] (2ND FLR);  Service: Endoscopy;  Laterality: N/A;    ESOPHAGOGASTRODUODENOSCOPY N/A 2019    Procedure: EGD (ESOPHAGOGASTRODUODENOSCOPY);  Surgeon: Ramiro Jefferson MD;  Location: [facility] (4TH FLR);  Service: Endoscopy;  Laterality: N/A;    EYE SURGERY      Cataract Removal    FLUOROSCOPIC URODYNAMIC STUDY N/A 2021    Procedure: URODYNAMIC STUDY, FLUOROSCOPIC;  Surgeon: Viridiana Valenzuela MD;  Location: [facility] OR 1ST FLR;  Service: Urology;  Laterality: N/A;  90 minutes     HYSTERECTOMY      posterolateral fusion with autograft bone and Eun  mineralized bone matrix  2/1/13    at [facility] for lumbar spine stenosis    TOE SURGERY      TONSILLECTOMY      TRIGGER FINGER RELEASE       Family History   Problem Relation Age of Onset    Heart disease Father 50        Mi age 50    Colon cancer Father     Bladder Cancer Mother         non smoker    Cataracts Mother     Glaucoma Mother     Heart disease Mother     Hyperlipidemia Mother     Kidney disease Mother     Breast cancer Sister 79    Arthritis Daughter     Asthma Daughter     Depression Daughter     Hypertension Daughter     Stroke Daughter 40    Arthritis Brother     Colon cancer Brother 70    Alcohol abuse Brother     Parkinsonism Brother     Alcohol abuse Brother     Depression Daughter     Celiac disease Neg Hx     Cirrhosis Neg Hx     Colon polyps Neg Hx     Crohn's disease Neg Hx     Cystic fibrosis Neg Hx     Esophageal cancer Neg Hx     Hemochromatosis Neg Hx     Inflammatory bowel disease Neg Hx     Irritable bowel syndrome Neg Hx     Liver cancer Neg Hx     Liver disease Neg Hx     Rectal cancer Neg Hx     Stomach cancer Neg Hx     Ulcerative colitis Neg Hx     Eliazar's disease Neg Hx     Amblyopia Neg Hx     Blindness Neg Hx     Macular degeneration Neg Hx     Retinal detachment Neg Hx     Strabismus Neg Hx     Melanoma Neg Hx      Social History     Tobacco Use    Smoking status: Never Smoker    Smokeless tobacco: Never Used   Substance Use Topics    Alcohol use: Yes     Comment: socially, hardly ever    Drug use: No     Review of Systems   Constitutional: Negative for fever.   HENT: Negative for sore throat.    Respiratory: Negative for shortness of breath.    Cardiovascular: Negative for chest pain.   Gastrointestinal: Positive for abdominal pain and nausea.   Genitourinary: Negative for dysuria.   Musculoskeletal: Negative for back pain.   Skin: Positive for wound. Negative for rash.   Neurological: Negative for weakness.   Hematological: Does not  bruise/bleed easily.       Physical Exam     Initial Vitals [01/01/22 2031]   BP Pulse Resp Temp SpO2   127/68 (!) 111 20 97.8 °F (36.6 °C) 98 %      MAP       --         Physical Exam    Nursing note and vitals reviewed.  Constitutional: She appears well-developed and well-nourished. She is not diaphoretic.   HENT:   Head: Normocephalic and atraumatic.   Mouth/Throat: Oropharynx is clear and moist.   Eyes: EOM are normal. Pupils are equal, round, and reactive to light. Right eye exhibits no discharge. Left eye exhibits no discharge.   Neck: No tracheal deviation present.   Normal range of motion.  Cardiovascular: Normal rate, regular rhythm and intact distal pulses.   Pulmonary/Chest: No respiratory distress. She has no wheezes. She exhibits no tenderness.   Abdominal: Abdomen is soft. She exhibits no distension. There is abdominal tenderness.   Periumbilical and LUQ tenderness to palpation without rebound or guarding.    Musculoskeletal:         General: No tenderness or edema. Normal range of motion.      Cervical back: Normal range of motion.     Neurological: She is alert and oriented to person, place, and time. She has normal strength. No cranial nerve deficit or sensory deficit. GCS eye subscore is 4. GCS verbal subscore is 5. GCS motor subscore is 6.   Skin: Skin is warm and dry. No rash noted.   Psychiatric: She has a normal mood and affect. Her behavior is normal. Thought content normal.         ED Course   Procedures  Labs Reviewed   CBC W/ AUTO DIFFERENTIAL - Abnormal; Notable for the following components:       Result Value    RBC 3.54 (*)     Hemoglobin 11.5 (*)     Hematocrit 36.1 (*)      (*)     MCH 32.5 (*)     MCHC 31.9 (*)     Platelets 84 (*)     Gran % 74.4 (*)     Lymph % 14.5 (*)     All other components within normal limits   COMPREHENSIVE METABOLIC PANEL - Abnormal; Notable for the following components:    Glucose 179 (*)     Total Bilirubin 1.4 (*)     AST 51 (*)     All other  components within normal limits   URINALYSIS, REFLEX TO URINE CULTURE - Abnormal; Notable for the following components:    Leukocytes, UA Trace (*)     All other components within normal limits    Narrative:     Specimen Source->Urine   URINALYSIS MICROSCOPIC - Abnormal; Notable for the following components:    WBC, UA 8 (*)     All other components within normal limits    Narrative:     Specimen Source->Urine   LIPASE   LACTIC ACID, PLASMA   COMPREHENSIVE METABOLIC PANEL   MAGNESIUM   PHOSPHORUS   CBC W/ AUTO DIFFERENTIAL   SARS-COV-2 RDRP GENE    Narrative:     This test utilizes isothermal nucleic acid amplification   technology to detect the SARS-CoV-2 RdRp nucleic acid segment.   The analytical sensitivity (limit of detection) is 125 genome   equivalents/mL.   A POSITIVE result implies infection with the SARS-CoV-2 virus;   the patient is presumed to be contagious.     A NEGATIVE result means that SARS-CoV-2 nucleic acids are not   present above the limit of detection. A NEGATIVE result should be   treated as presumptive. It does not rule out the possibility of   COVID-19 and should not be the sole basis for treatment decisions.   If COVID-19 is strongly suspected based on clinical and exposure   history, re-testing using an alternate molecular assay should be   considered.   This test is only for use under the Food and Drug   Administration s Emergency Use Authorization (EUA).   Commercial kits are provided by [employer].   Performance characteristics of the EUA have been independently   verified by [facility] Department of   Pathology and Laboratory Medicine.   _________________________________________________________________   The authorized Fact Sheet for Healthcare Providers and the authorized Fact   Sheet for Patients of the ID NOW COVID-19 are available on the FDA   website:     https://www.fda.gov/media/535157/download  https://www.fda.gov/media/275423/download                Imaging  Results          CT Abdomen Pelvis With Contrast (Final result)  Result time 01/02/22 02:09:40    Final result by Juan Pablo Sung MD (01/02/22 02:09:40)                 Impression:      1. Dilated small bowel at the site of 2 small bowel-small bowel anastomoses as detailed above.  Dilation of these areas is increased as compared to CT 10/15/2021.  Remaining small bowel is normal in caliber.  Findings again suggest partial small bowel obstruction.  Focal transition point not identified.  Dilated loops in this region appears somewhat clustered close to the anterior abdominal wall with there is increased soft tissue thickening present along the peritoneal surface underneath and incision site.  Suspect likely adhesions, possibly complex adhesions.  2. Moderate amount of stool in the colon.  3. Mild splenomegaly.  4. Stable mild right hydronephrosis  5. Additional findings as above.    Electronically signed by resident: Zana Wilson  Date:    01/02/2022  Time:    01:18    Electronically signed by: Juan Pablo Sung MD  Date:    01/02/2022  Time:    02:09             Narrative:    EXAMINATION:  CT ABDOMEN PELVIS WITH CONTRAST    CLINICAL HISTORY:  diffuse abd pain, hx multiple SBO in the past;    TECHNIQUE:  Axial images of the abdomen and pelvis were acquired  after the use of 75 cc [id number] IV contrast.  Coronal and sagittal reconstructions were also obtained    COMPARISON:  CT abdomen pelvis 10/15/2021, 05/14/2021, 02/26/2021    FINDINGS:  Heart: Normal in size. No pericardial effusion. Multi-vessel calcific coronary atherosclerosis.    Lungs: Mild mostly dependent subsegmental atelectasis.  No consolidation.  No pleural fluid or pneumothorax.    Liver: Normal in size and contour.  No focal hepatic lesion.    Gallbladder: Surgically absent.    Bile Ducts: No evidence of dilated ducts.    Pancreas: No mass or peripancreatic fat stranding.    Spleen: Mildly enlarged measuring up to 11.6 cm in AP dimension.    Stomach and  duodenum: Unremarkable.    Adrenals: Unremarkable.    Kidneys/ Ureters: Normal in size and location. Normal enhancement.  Mild right hydronephrosis, similar to prior.  Left hydronephrosis.  No renal stones.  No ureteral dilatation.  Stable right renal hypodensities too small to characterize.    Bladder: No evidence of wall thickening.    Reproductive organs: Uterus surgically absent.    Bowel/Mesentery: Postoperative change of small bowel resections with dilation of the small bowel up to 5.8 cm at an anastomosis in the right abdomen (axial series 2, image 75) and dilation of the small bowel up to 4.9 cm at an anastomosis in the mid abdomen (axial series 2, image 108).  Dilation appears increased as compared to CT 10/15/2021.  Small bowel is otherwise normal in caliber with no evidence of obstruction. No evidence of inflammation or wall thickening.  Appendix not definitively visualized, surgically absent per history.  Colon demonstrates no focal wall thickening.  Moderate amount of stool throughout the colon    Peritoneum: No intraperitoneal free air or fluid    Lymph nodes: No retroperitoneal lymphadenopathy.    Vasculature: No aneurysm. Mild calcific atherosclerosis.    Abdominal wall:  Unremarkable.  Prominent collateral vessels in the lower abdominal wall.    Bones: Grade 2 anterolisthesis L5 on S1 with partial osseous fusion at this level and bilateral L5 pars defects, unchanged.  Degenerative changes, similar to prior.  No acute fracture. No suspicious osseous lesions.                               CT Head Without Contrast (Final result)  Result time 01/02/22 01:32:36    Final result by Martha Zuluaga MD (01/02/22 01:32:36)                 Impression:      No CT evidence of acute intracranial abnormality. Clinical correlation and further evaluation as warranted.      Electronically signed by: Martha Zuluaga MD  Date:    01/02/2022  Time:    01:32             Narrative:    EXAMINATION:  CT HEAD WITHOUT  CONTRAST    CLINICAL HISTORY:  Head trauma, intracranial venous injury suspected;    TECHNIQUE:  Low dose axial images were obtained through the head.  Coronal and sagittal reformations were also performed. Contrast was not administered.    COMPARISON:  MRI brain 10/21/2021, head CT 01/17/2021    FINDINGS:  There is no acute intracranial hemorrhage, hydrocephalus, midline shift or mass effect. Gray-white matter differentiation appears maintained. The basal cisterns are patent. The visualized paranasal sinuses and left mastoid air cells are clear of acute process.  There is minimal opacification of the inferior right mastoid air cells.  The visualized bones of the calvarium demonstrate no acute osseous abnormality.                               X-Ray Elbow Complete Left (Final result)  Result time 01/01/22 23:14:17    Final result by Juan Pablo Sung MD (01/01/22 23:14:17)                 Impression:      There is no evidence of fracture or subluxation.      Electronically signed by: Juan Pablo Sung MD  Date:    01/01/2022  Time:    23:14             Narrative:    EXAMINATION:  XR ELBOW COMPLETE 3 VIEW LEFT    CLINICAL HISTORY:  Pain in left elbow    TECHNIQUE:  AP, lateral, and oblique views of the left elbow were performed.    COMPARISON:  None    FINDINGS:  No fractures or dislocations.  Unremarkable visualized bony structures. No fat pad elevation.                                 Medications   atorvastatin tablet 40 mg (has no administration in time range)   butalbital-acetaminophen-caffeine -40 mg per tablet 1 tablet (has no administration in time range)   DULoxetine DR capsule 30 mg (has no administration in time range)   levothyroxine tablet 75 mcg (has no administration in time range)   montelukast tablet 10 mg (has no administration in time range)   pantoprazole EC tablet 20 mg (has no administration in time range)   valACYclovir tablet 1,000 mg (has no administration in time range)   sodium chloride  0.9% flush 3 mL (has no administration in time range)   acetaminophen tablet 650 mg (has no administration in time range)   HYDROcodone-acetaminophen 5-325 mg per tablet 1 tablet (has no administration in time range)   ondansetron injection 4 mg (has no administration in time range)   lactated ringers infusion (has no administration in time range)   sodium phosphates 19-7 gram/118 mL enema 1 enema (has no administration in time range)   sodium chloride 0.9% bolus 1,000 mL (0 mLs Intravenous Stopped 1/2/22 0345)   ondansetron injection 4 mg (4 mg Intravenous Given 1/1/22 2204)   morphine injection 2 mg (2 mg Intravenous Given 1/1/22 2204)   iohexoL (OMNIPAQUE 350) injection 75 mL (75 mLs Intravenous Given 1/2/22 0105)     Medical Decision Making:   Initial Assessment:   Lower abdominal pain, history of irritable bowel   ED Management:  75yo F presenting to the ER with abdominal pain and constipation, feeling like her prior SBOs. CT shows partial SBO. General surgery consulted, will admit patient to their service. Labs otherwise unremarkable. No signs of infection at this time. CT head and XR elbow negative for ICH or traumatic injury. Doubt c-spine injury.              ED Course as of 01/02/22 0403   Sun Jan 02, 2022   0252 Case discussed with general surgery; their likely plan is to admit. [BS]      ED Course User Index  [BS] Calderon Lira MD             Clinical Impression:   Final diagnoses:  [R00.0] Tachycardia  [M25.522] Left elbow pain          ED Disposition Condition    Observation               Awilda Davis MD  01/02/22 6337

## 2022-01-02 NOTE — CONSULTS
Admit to obs  Bowel reg mIVF    Full H&P to follow     Horacio Morrison MD   Pager: [phone]  General Surgery PGY-V  [facility]

## 2022-01-02 NOTE — PLAN OF CARE
Patient AAOX4, up with stand-by assistance to bathroom, and bed alarm in use. Daughter at bedside and involved in care. Enema completed with minimal output. Complaints of pain to left IV site; IV removed. Remaining IV site noted to be red across vein; removed and new IV placed. Midline consulted for better peripheral access. NPO status maintained. Patient stable, will continue to monitor.

## 2022-01-02 NOTE — ED NOTES
..  Patient identifiers for Anayeli Judd 76 y.o. female checked and correct.  Chief Complaint   Patient presents with    Abdominal Pain     Pt reports abdominal pain this evening. +N/V, LBM tonight, reports it as small pieces. Hx of bowel obstruction.     Past Medical History:   Diagnosis Date    Abdominal pain     Allergic rhinitis     Arthritis     Blood platelet disorder     Blood platelet disorder     Blood transfusion     during delivery and     Bowel obstruction     Cervical radiculopathy     followed by dr cloud    Colon cancer     transverse colon; resected; Stage IIA (pT3 pN0 MX)    Diabetes mellitus     Diarrhea     Falls 2020    Family history of breast cancer     Family history of colon cancer     Fatty liver     GERD (gastroesophageal reflux disease)     History of shingles     Hyperlipidemia     Hypothyroidism     Irritable bowel syndrome     Microscopic colitis     treated     Raynaud phenomenon     Raynaud's disease     Type 2 diabetes mellitus      Allergies reported:   Review of patient's allergies indicates:   Allergen Reactions    Dilaudid [hydromorphone (bulk)] Other (See Comments)     Oversedating, head burning. Pt prefers to avoid.       Codeine Nausea And Vomiting     Other reaction(s): Itching    Dilaudid [hydromorphone]     Percocet  [oxycodone-acetaminophen]      Other reaction(s): Itching    Sulfa (sulfonamide antibiotics)      Other reaction(s): Nausea  Other reaction(s): Itching         LOC: Patient is awake, alert, and aware of environment with an appropriate affect. Patient is oriented x 3 and speaking appropriately.  APPEARANCE: Patient resting comfortably and in no acute distress. Patient is clean and well groomed, patient's clothing is properly fastened.  HEENT: **AAO --c/o nausea/abdominal pain off and on for the last 10 yrs. Today she was passing small stool and having mid abdominal pain and left upper abdominal pain. Hard  Patient Name: Adelfoamparo Anderson Jr.  Specialist or PCP: ladan Calderón  Symptoms: Breathing problem, short of breath   Best Availability: Any  Callback Number: [phone] (M)    Thank you,  Anaya Rouse     of hearing.  SKIN: The skin is warm and dry. Patient has normal skin turgor and moist mucus membranes. Skin is intact; breakdown noted.Contusion to forehead p falling out of bed   MUSKULOSKELETAL: Patient is moving all extremities well, no obvious deformities noted. Pulses intact.   RESPIRATORY: Airway is open and patent. Respirations are spontaneous and non-labored with normal effort and rate, BBS=clear  CARDIAC: Patient has a normal rate and rhythm. NSR on cardiac monitor,No peripheral edema noted.   ABDOMEN: No distention noted. Bowel sounds active in all 4 quadrants. Soft and non-tender upon palpation.Hypoactive bowel sounds heard   NEUROLOGICAL: pupils **mm, PERRL. Facial expression is symmetrical. Hand grasps are equal bilaterally. Normal sensation in all extremities when touched with finger.

## 2022-01-03 ENCOUNTER — PATIENT MESSAGE (OUTPATIENT)
Dept: GASTROENTEROLOGY | Facility: CLINIC | Age: 77
End: 2022-01-03
Payer: MEDICARE

## 2022-01-03 LAB
ALBUMIN SERPL BCP-MCNC: 2.9 G/DL (ref 3.5–5.2)
ALP SERPL-CCNC: 80 U/L (ref 55–135)
ALT SERPL W/O P-5'-P-CCNC: 33 U/L (ref 10–44)
ANION GAP SERPL CALC-SCNC: 7 MMOL/L (ref 8–16)
AST SERPL-CCNC: 31 U/L (ref 10–40)
BASOPHILS # BLD AUTO: 0.02 K/UL (ref 0–0.2)
BASOPHILS NFR BLD: 0.6 % (ref 0–1.9)
BILIRUB SERPL-MCNC: 1.4 MG/DL (ref 0.1–1)
BUN SERPL-MCNC: 8 MG/DL (ref 8–23)
CALCIUM SERPL-MCNC: 8.7 MG/DL (ref 8.7–10.5)
CHLORIDE SERPL-SCNC: 107 MMOL/L (ref 95–110)
CO2 SERPL-SCNC: 26 MMOL/L (ref 23–29)
CREAT SERPL-MCNC: 0.7 MG/DL (ref 0.5–1.4)
DIFFERENTIAL METHOD: ABNORMAL
EOSINOPHIL # BLD AUTO: 0.2 K/UL (ref 0–0.5)
EOSINOPHIL NFR BLD: 5.2 % (ref 0–8)
ERYTHROCYTE [DISTWIDTH] IN BLOOD BY AUTOMATED COUNT: 14 % (ref 11.5–14.5)
EST. GFR  (AFRICAN AMERICAN): >60 ML/MIN/1.73 M^2
EST. GFR  (NON AFRICAN AMERICAN): >60 ML/MIN/1.73 M^2
GLUCOSE SERPL-MCNC: 113 MG/DL (ref 70–110)
HCT VFR BLD AUTO: 32.5 % (ref 37–48.5)
HGB BLD-MCNC: 10.5 G/DL (ref 12–16)
IMM GRANULOCYTES # BLD AUTO: 0 K/UL (ref 0–0.04)
IMM GRANULOCYTES NFR BLD AUTO: 0 % (ref 0–0.5)
LYMPHOCYTES # BLD AUTO: 1.2 K/UL (ref 1–4.8)
LYMPHOCYTES NFR BLD: 33.6 % (ref 18–48)
MAGNESIUM SERPL-MCNC: 1.6 MG/DL (ref 1.6–2.6)
MCH RBC QN AUTO: 33 PG (ref 27–31)
MCHC RBC AUTO-ENTMCNC: 32.3 G/DL (ref 32–36)
MCV RBC AUTO: 102 FL (ref 82–98)
MONOCYTES # BLD AUTO: 0.5 K/UL (ref 0.3–1)
MONOCYTES NFR BLD: 13 % (ref 4–15)
NEUTROPHILS # BLD AUTO: 1.6 K/UL (ref 1.8–7.7)
NEUTROPHILS NFR BLD: 47.6 % (ref 38–73)
NRBC BLD-RTO: 0 /100 WBC
PHOSPHATE SERPL-MCNC: 3.9 MG/DL (ref 2.7–4.5)
PLATELET # BLD AUTO: 62 K/UL (ref 150–450)
PMV BLD AUTO: 10.5 FL (ref 9.2–12.9)
POTASSIUM SERPL-SCNC: 4.1 MMOL/L (ref 3.5–5.1)
PROT SERPL-MCNC: 5.1 G/DL (ref 6–8.4)
RBC # BLD AUTO: 3.18 M/UL (ref 4–5.4)
SODIUM SERPL-SCNC: 140 MMOL/L (ref 136–145)
WBC # BLD AUTO: 3.45 K/UL (ref 3.9–12.7)

## 2022-01-03 PROCEDURE — 80053 COMPREHEN METABOLIC PANEL: CPT | Performed by: STUDENT IN AN ORGANIZED HEALTH CARE EDUCATION/TRAINING PROGRAM

## 2022-01-03 PROCEDURE — 94761 N-INVAS EAR/PLS OXIMETRY MLT: CPT

## 2022-01-03 PROCEDURE — 84100 ASSAY OF PHOSPHORUS: CPT | Performed by: STUDENT IN AN ORGANIZED HEALTH CARE EDUCATION/TRAINING PROGRAM

## 2022-01-03 PROCEDURE — A4216 STERILE WATER/SALINE, 10 ML: HCPCS | Performed by: STUDENT IN AN ORGANIZED HEALTH CARE EDUCATION/TRAINING PROGRAM

## 2022-01-03 PROCEDURE — 25000003 PHARM REV CODE 250: Performed by: STUDENT IN AN ORGANIZED HEALTH CARE EDUCATION/TRAINING PROGRAM

## 2022-01-03 PROCEDURE — 25500020 PHARM REV CODE 255: Performed by: STUDENT IN AN ORGANIZED HEALTH CARE EDUCATION/TRAINING PROGRAM

## 2022-01-03 PROCEDURE — 85025 COMPLETE CBC W/AUTO DIFF WBC: CPT | Performed by: STUDENT IN AN ORGANIZED HEALTH CARE EDUCATION/TRAINING PROGRAM

## 2022-01-03 PROCEDURE — 63600175 PHARM REV CODE 636 W HCPCS: Performed by: STUDENT IN AN ORGANIZED HEALTH CARE EDUCATION/TRAINING PROGRAM

## 2022-01-03 PROCEDURE — 20600001 HC STEP DOWN PRIVATE ROOM

## 2022-01-03 PROCEDURE — 83735 ASSAY OF MAGNESIUM: CPT | Performed by: STUDENT IN AN ORGANIZED HEALTH CARE EDUCATION/TRAINING PROGRAM

## 2022-01-03 PROCEDURE — 36415 COLL VENOUS BLD VENIPUNCTURE: CPT | Performed by: STUDENT IN AN ORGANIZED HEALTH CARE EDUCATION/TRAINING PROGRAM

## 2022-01-03 RX ADMIN — LEVOTHYROXINE SODIUM 75 MCG: 75 TABLET ORAL at 05:01

## 2022-01-03 RX ADMIN — VALACYCLOVIR HYDROCHLORIDE 1000 MG: 500 TABLET, FILM COATED ORAL at 10:01

## 2022-01-03 RX ADMIN — ENEMA 1 ENEMA: 19; 7 ENEMA RECTAL at 08:01

## 2022-01-03 RX ADMIN — Medication 3 ML: at 10:01

## 2022-01-03 RX ADMIN — Medication 3 ML: at 06:01

## 2022-01-03 RX ADMIN — SODIUM CHLORIDE, SODIUM LACTATE, POTASSIUM CHLORIDE, AND CALCIUM CHLORIDE: 600; 310; 30; 20 INJECTION, SOLUTION INTRAVENOUS at 11:01

## 2022-01-03 RX ADMIN — DULOXETINE 30 MG: 30 CAPSULE, DELAYED RELEASE ORAL at 10:01

## 2022-01-03 RX ADMIN — HYDROCODONE BITARTRATE AND ACETAMINOPHEN 1 TABLET: 5; 325 TABLET ORAL at 05:01

## 2022-01-03 RX ADMIN — VALACYCLOVIR HYDROCHLORIDE 1000 MG: 500 TABLET, FILM COATED ORAL at 09:01

## 2022-01-03 RX ADMIN — Medication 3 ML: at 04:01

## 2022-01-03 RX ADMIN — PANTOPRAZOLE SODIUM 20 MG: 20 TABLET, DELAYED RELEASE ORAL at 09:01

## 2022-01-03 RX ADMIN — DULOXETINE 30 MG: 30 CAPSULE, DELAYED RELEASE ORAL at 09:01

## 2022-01-03 RX ADMIN — DIATRIZOATE MEGLUMINE AND DIATRIZOATE SODIUM 50 ML: 660; 100 LIQUID ORAL; RECTAL at 11:01

## 2022-01-03 RX ADMIN — MONTELUKAST 10 MG: 10 TABLET, FILM COATED ORAL at 09:01

## 2022-01-03 RX ADMIN — ATORVASTATIN CALCIUM 40 MG: 20 TABLET, FILM COATED ORAL at 09:01

## 2022-01-03 NOTE — PLAN OF CARE
A&Ox4. VVS on RA. Diabetic diet tolerated well. Brusing to forehead, bilateral arms, and left elbow with bruising and scab r/t to fall prior to arrival. LR @ 100. Ambulated to toilet and BSC with standby assist. Denies pain, nausea, vomiting at this time. Pt currently resting comfortably in chair, wheels locked, call light in reach. WCTM.

## 2022-01-03 NOTE — SUBJECTIVE & OBJECTIVE
Interval History: NAEON.  No nausea or emesis.  No bowel movement as of yet.  Hemodynamically stable.    Medications:  Continuous Infusions:   lactated ringers 100 mL/hr at 01/02/22 0518     Scheduled Meds:   atorvastatin  40 mg Oral Daily    diatrizoate meglumineand-diatrizoate sodium  50 mL Oral Once    DULoxetine  30 mg Oral BID    levothyroxine  75 mcg Oral Before breakfast    montelukast  10 mg Oral Daily    pantoprazole  20 mg Oral Daily    sodium chloride 0.9%  3 mL Intravenous Q8H    sodium phosphates  1 enema Rectal Once    valACYclovir  1,000 mg Oral BID     PRN Meds:acetaminophen, butalbital-acetaminophen-caffeine -40 mg, HYDROcodone-acetaminophen, ondansetron     Review of patient's allergies indicates:   Allergen Reactions    Dilaudid [hydromorphone (bulk)] Other (See Comments)     Oversedating, head burning. Pt prefers to avoid.       Codeine Nausea And Vomiting     Other reaction(s): Itching    Dilaudid [hydromorphone]     Percocet  [oxycodone-acetaminophen]      Other reaction(s): Itching    Sulfa (sulfonamide antibiotics)      Other reaction(s): Nausea  Other reaction(s): Itching     Objective:     Vital Signs (Most Recent):  Temp: 97.7 °F (36.5 °C) (01/03/22 0515)  Pulse: 64 (01/03/22 0515)  Resp: 20 (01/03/22 0557)  BP: (!) 118/52 (01/03/22 0515)  SpO2: (!) 94 % (01/03/22 0515) Vital Signs (24h Range):  Temp:  [96.7 °F (35.9 °C)-98 °F (36.7 °C)] 97.7 °F (36.5 °C)  Pulse:  [57-93] 64  Resp:  [16-20] 20  SpO2:  [94 %-100 %] 94 %  BP: ()/(49-55) 118/52     Weight: 56.7 kg (125 lb)  Body mass index is 22.86 kg/m².    Intake/Output - Last 3 Shifts       01/01 0700  01/02 0659 01/02 0700  01/03 0659 01/03 0700  01/04 0659    P.O.  0     IV Piggyback 1000      Total Intake(mL/kg) 1000 (17.6) 0 (0)     Urine (mL/kg/hr)  2 (0)     Stool  0     Total Output  2     Net +1000 -2            Urine Occurrence  2 x     Stool Occurrence  1 x           Physical Exam  Constitutional:        Appearance: Normal appearance.   HENT:      Head: Normocephalic and atraumatic.   Eyes:      Extraocular Movements: Extraocular movements intact.   Cardiovascular:      Rate and Rhythm: Normal rate and regular rhythm.      Pulses: Normal pulses.   Pulmonary:      Effort: Pulmonary effort is normal. No respiratory distress.   Abdominal:      General: There is no distension.      Palpations: Abdomen is soft.      Tenderness: There is no abdominal tenderness.   Musculoskeletal:         General: Normal range of motion.      Cervical back: Normal range of motion.   Skin:     General: Skin is warm and dry.   Neurological:      General: No focal deficit present.      Mental Status: She is alert and oriented to person, place, and time.   Psychiatric:         Mood and Affect: Mood normal.         Behavior: Behavior normal.         Significant Labs:  I have reviewed all pertinent lab results within the past 24 hours.  CBC:   Recent Labs   Lab 01/03/22 0356   WBC 3.45*   RBC 3.18*   HGB 10.5*   HCT 32.5*   PLT 62*   *   MCH 33.0*   MCHC 32.3     BMP:   Recent Labs   Lab 01/03/22  0356   *      K 4.1      CO2 26   BUN 8   CREATININE 0.7   CALCIUM 8.7   MG 1.6     CMP:   Recent Labs   Lab 01/03/22  0356   *   CALCIUM 8.7   ALBUMIN 2.9*   PROT 5.1*      K 4.1   CO2 26      BUN 8   CREATININE 0.7   ALKPHOS 80   ALT 33   AST 31   BILITOT 1.4*       Significant Diagnostics:  I have reviewed all pertinent imaging results/findings within the past 24 hours.

## 2022-01-03 NOTE — PLAN OF CARE
Matias HUDSON  Initial Discharge Assessment       Primary Care Provider: Rocio Mc MD    Admission Diagnosis: Tachycardia [R00.0]  Left elbow pain [M25.522]    Admission Date: 1/1/2022  Expected Discharge Date: 1/4/2022    Payor: MEDICARE / Plan: MEDICARE PART A & B / Product Type: Government /     Extended Emergency Contact Information  Primary Emergency Contact: Danielle Whitley   [place]  Mobile Phone: [phone]  Relation: Daughter  Secondary Emergency Contact: EvensKimberly  Address: [address] [address] [facility]  Home Phone: [phone]  Relation: Daughter    Discharge Plan A: Home  Discharge Plan B: Home Health      [id number] - [address] & [address] [address]  Phone: [phone] Fax: [phone]    [facility] - [address] [address]  Phone: [phone] Fax: [phone]    [id number] [place], [place] - [address] AT [facility] [address]  [facility] [facility] [address]  Phone: [phone] Fax: [phone]        Patient is a 76 year old female admitted from home through the ER with abdominal pain.  Patient is expected to have a Gastrografin Challenge today and discharge home with needs to be determined +/- 1/4/2021.    Patient lives alone in a 7th floor, one story condo with an elevator for access.  Patient's Caregiver, Keya Petra ([phone]), is at her bedside, gave information while patient is on bedside commode.  Patient's daughters will drive her home.  Keya, Caregiver, goes to patient's home ([address]) and sometimes in between days to assist with ADLS, house duties, cooking, etc.  Stated patient was independent in ADLs without use of DME prior to admit.  [facility] Dis[facility]  Booklet given to patient and/or family with understanding verbalized.  CM name and number written on white board in patient's room with request to call for any questions and concerns.  Will continue to follow for needs.  Will continue to follow for needs.    Monse Allred RN, [facility] (ext: [phone])        Initial Assessment (most recent)     Adult Discharge Assessment - 01/03/22 1353        Discharge Assessment    Assessment Type Discharge Planning Assessment     Confirmed/corrected address, phone number and insurance Yes     Confirmed Demographics Correct on Facesheet     Source of Information other (see comments);patient   Care giver    Communicated LOI with patient/caregiver Yes     Reason For Admission Abdominal Pain     Lives With alone     Do you expect to return to your current living situation? Yes     Do you have help at home or someone to help you manage your care at home? Yes     Who are your caregiver(s) and their phone number(s)? Keya Lambert, Caregiver ([phone])     Prior to hospitilization cognitive status: Alert/Oriented     Current cognitive status: Alert/Oriented     Walking or Climbing Stairs Difficulty none     Dressing/Bathing Difficulty bathing difficulty, requires equipment     Dressing/Bathing Management shower chair     Equipment Currently Used at Home none     Do you currently have service(s) that help you manage your care at home? Yes     Name and Contact number of agency Home Caregiver ([facility]), Keya Daniels ([phone])     Do you take prescription medications? Yes     Do you have prescription coverage? Yes     Do you have any problems affording any of your prescribed medications? No     Is the patient taking medications as prescribed? yes     Who is going to help you get home at discharge? daughters     How do you get to doctors appointments? family or friend will provide     Discharge Plan A Home     Discharge Plan B Home Health     Discharge Plan discussed with: Caregiver

## 2022-01-03 NOTE — PLAN OF CARE
POC is reviewed and understood by patient. VSS. Up with standby assist to toilet. Bed alarm on. No sign of distress noted. No c/o pain, nausea. Still no BM noted. Call bell in reach. HOB elevated. Bed in lowest position. WCTM.

## 2022-01-03 NOTE — ASSESSMENT & PLAN NOTE
Anayeli Judd is a 76 y.o. female with IBS with mixed constipation/diarrhea. She has a history of multiple small bowel obstructions. Her CT has chronic findings which can be interpreted as small bowel obstruction but her symptoms are more consistent with constipation, possibly early partial small bowel obstruction     - Will trial GGC today  - Will also give suppository  - mIVF  - daily labs  - NGT if she starts vomiting  - Involve GI PRN due to her chronic colonic issues, follows with Dr. Jefferson outpatient

## 2022-01-03 NOTE — PROGRESS NOTES
Matias Johansen - [facility]  General Surgery  Progress Note    Subjective:     History of Present Illness:  No notes on file    Post-Op Info:  * No surgery found *         Interval History: NAEON.  No nausea or emesis.  No bowel movement as of yet.  Hemodynamically stable.    Medications:  Continuous Infusions:   lactated ringers 100 mL/hr at 01/02/22 0518     Scheduled Meds:   atorvastatin  40 mg Oral Daily    diatrizoate meglumineand-diatrizoate sodium  50 mL Oral Once    DULoxetine  30 mg Oral BID    levothyroxine  75 mcg Oral Before breakfast    montelukast  10 mg Oral Daily    pantoprazole  20 mg Oral Daily    sodium chloride 0.9%  3 mL Intravenous Q8H    sodium phosphates  1 enema Rectal Once    valACYclovir  1,000 mg Oral BID     PRN Meds:acetaminophen, butalbital-acetaminophen-caffeine -40 mg, HYDROcodone-acetaminophen, ondansetron     Review of patient's allergies indicates:   Allergen Reactions    Dilaudid [hydromorphone (bulk)] Other (See Comments)     Oversedating, head burning. Pt prefers to avoid.       Codeine Nausea And Vomiting     Other reaction(s): Itching    Dilaudid [hydromorphone]     Percocet  [oxycodone-acetaminophen]      Other reaction(s): Itching    Sulfa (sulfonamide antibiotics)      Other reaction(s): Nausea  Other reaction(s): Itching     Objective:     Vital Signs (Most Recent):  Temp: 97.7 °F (36.5 °C) (01/03/22 0515)  Pulse: 64 (01/03/22 0515)  Resp: 20 (01/03/22 0557)  BP: (!) 118/52 (01/03/22 0515)  SpO2: (!) 94 % (01/03/22 0515) Vital Signs (24h Range):  Temp:  [96.7 °F (35.9 °C)-98 °F (36.7 °C)] 97.7 °F (36.5 °C)  Pulse:  [57-93] 64  Resp:  [16-20] 20  SpO2:  [94 %-100 %] 94 %  BP: ()/(49-55) 118/52     Weight: 56.7 kg (125 lb)  Body mass index is 22.86 kg/m².    Intake/Output - Last 3 Shifts       01/01 0700 01/02 0659 01/02 0700 01/03 0659 01/03 0700 01/04 0659    P.O.  0     IV Piggyback 1000      Total Intake(mL/kg) 1000 (17.6) 0 (0)     Urine (mL/kg/hr)   2 (0)     Stool  0     Total Output  2     Net +1000 -2            Urine Occurrence  2 x     Stool Occurrence  1 x           Physical Exam  Constitutional:       Appearance: Normal appearance.   HENT:      Head: Normocephalic and atraumatic.   Eyes:      Extraocular Movements: Extraocular movements intact.   Cardiovascular:      Rate and Rhythm: Normal rate and regular rhythm.      Pulses: Normal pulses.   Pulmonary:      Effort: Pulmonary effort is normal. No respiratory distress.   Abdominal:      General: There is no distension.      Palpations: Abdomen is soft.      Tenderness: There is no abdominal tenderness.   Musculoskeletal:         General: Normal range of motion.      Cervical back: Normal range of motion.   Skin:     General: Skin is warm and dry.   Neurological:      General: No focal deficit present.      Mental Status: She is alert and oriented to person, place, and time.   Psychiatric:         Mood and Affect: Mood normal.         Behavior: Behavior normal.         Significant Labs:  I have reviewed all pertinent lab results within the past 24 hours.  CBC:   Recent Labs   Lab 01/03/22 0356   WBC 3.45*   RBC 3.18*   HGB 10.5*   HCT 32.5*   PLT 62*   *   MCH 33.0*   MCHC 32.3     BMP:   Recent Labs   Lab 01/03/22  0356   *      K 4.1      CO2 26   BUN 8   CREATININE 0.7   CALCIUM 8.7   MG 1.6     CMP:   Recent Labs   Lab 01/03/22  0356   *   CALCIUM 8.7   ALBUMIN 2.9*   PROT 5.1*      K 4.1   CO2 26      BUN 8   CREATININE 0.7   ALKPHOS 80   ALT 33   AST 31   BILITOT 1.4*       Significant Diagnostics:  I have reviewed all pertinent imaging results/findings within the past 24 hours.    Assessment/Plan:     * Irritable bowel syndrome with both constipation and diarrhea  Anayeli Judd is a 76 y.o. female with IBS with mixed constipation/diarrhea. She has a history of multiple small bowel obstructions. Her CT has chronic findings which can be interpreted  as small bowel obstruction but her symptoms are more consistent with constipation, possibly early partial small bowel obstruction     - Will trial GGC today  - Will also give suppository  - mIVF  - daily labs  - NGT if she starts vomiting  - Involve GI PRN due to her chronic colonic issues, follows with Dr. Jefferson outpatient         Doc Hyatt MD  General Surgery  [facility]

## 2022-01-04 ENCOUNTER — TELEPHONE (OUTPATIENT)
Dept: GASTROENTEROLOGY | Facility: CLINIC | Age: 77
End: 2022-01-04
Payer: MEDICARE

## 2022-01-04 VITALS
DIASTOLIC BLOOD PRESSURE: 56 MMHG | HEART RATE: 74 BPM | SYSTOLIC BLOOD PRESSURE: 114 MMHG | OXYGEN SATURATION: 97 % | BODY MASS INDEX: 23 KG/M2 | WEIGHT: 125 LBS | RESPIRATION RATE: 18 BRPM | HEIGHT: 62 IN | TEMPERATURE: 98 F

## 2022-01-04 LAB
ALBUMIN SERPL BCP-MCNC: 3 G/DL (ref 3.5–5.2)
ALP SERPL-CCNC: 111 U/L (ref 55–135)
ALT SERPL W/O P-5'-P-CCNC: 33 U/L (ref 10–44)
ANION GAP SERPL CALC-SCNC: 8 MMOL/L (ref 8–16)
AST SERPL-CCNC: 36 U/L (ref 10–40)
BASOPHILS # BLD AUTO: 0.01 K/UL (ref 0–0.2)
BASOPHILS NFR BLD: 0.3 % (ref 0–1.9)
BILIRUB SERPL-MCNC: 1.4 MG/DL (ref 0.1–1)
BUN SERPL-MCNC: 8 MG/DL (ref 8–23)
CALCIUM SERPL-MCNC: 8.8 MG/DL (ref 8.7–10.5)
CHLORIDE SERPL-SCNC: 103 MMOL/L (ref 95–110)
CO2 SERPL-SCNC: 24 MMOL/L (ref 23–29)
CREAT SERPL-MCNC: 0.7 MG/DL (ref 0.5–1.4)
DIFFERENTIAL METHOD: ABNORMAL
EOSINOPHIL # BLD AUTO: 0.2 K/UL (ref 0–0.5)
EOSINOPHIL NFR BLD: 4.8 % (ref 0–8)
ERYTHROCYTE [DISTWIDTH] IN BLOOD BY AUTOMATED COUNT: 13.7 % (ref 11.5–14.5)
EST. GFR  (AFRICAN AMERICAN): >60 ML/MIN/1.73 M^2
EST. GFR  (NON AFRICAN AMERICAN): >60 ML/MIN/1.73 M^2
GLUCOSE SERPL-MCNC: 127 MG/DL (ref 70–110)
HCT VFR BLD AUTO: 29.1 % (ref 37–48.5)
HGB BLD-MCNC: 9.6 G/DL (ref 12–16)
IMM GRANULOCYTES # BLD AUTO: 0 K/UL (ref 0–0.04)
IMM GRANULOCYTES NFR BLD AUTO: 0 % (ref 0–0.5)
LYMPHOCYTES # BLD AUTO: 1.1 K/UL (ref 1–4.8)
LYMPHOCYTES NFR BLD: 27.5 % (ref 18–48)
MAGNESIUM SERPL-MCNC: 1.3 MG/DL (ref 1.6–2.6)
MCH RBC QN AUTO: 32.7 PG (ref 27–31)
MCHC RBC AUTO-ENTMCNC: 33 G/DL (ref 32–36)
MCV RBC AUTO: 99 FL (ref 82–98)
MONOCYTES # BLD AUTO: 0.4 K/UL (ref 0.3–1)
MONOCYTES NFR BLD: 10.3 % (ref 4–15)
NEUTROPHILS # BLD AUTO: 2.3 K/UL (ref 1.8–7.7)
NEUTROPHILS NFR BLD: 57.1 % (ref 38–73)
NRBC BLD-RTO: 0 /100 WBC
PHOSPHATE SERPL-MCNC: 3.9 MG/DL (ref 2.7–4.5)
PLATELET # BLD AUTO: 65 K/UL (ref 150–450)
PMV BLD AUTO: 11 FL (ref 9.2–12.9)
POTASSIUM SERPL-SCNC: 3.7 MMOL/L (ref 3.5–5.1)
PROT SERPL-MCNC: 5.3 G/DL (ref 6–8.4)
RBC # BLD AUTO: 2.94 M/UL (ref 4–5.4)
SODIUM SERPL-SCNC: 135 MMOL/L (ref 136–145)
WBC # BLD AUTO: 4 K/UL (ref 3.9–12.7)

## 2022-01-04 PROCEDURE — 25000003 PHARM REV CODE 250: Performed by: STUDENT IN AN ORGANIZED HEALTH CARE EDUCATION/TRAINING PROGRAM

## 2022-01-04 PROCEDURE — 84100 ASSAY OF PHOSPHORUS: CPT | Performed by: STUDENT IN AN ORGANIZED HEALTH CARE EDUCATION/TRAINING PROGRAM

## 2022-01-04 PROCEDURE — 80053 COMPREHEN METABOLIC PANEL: CPT | Performed by: STUDENT IN AN ORGANIZED HEALTH CARE EDUCATION/TRAINING PROGRAM

## 2022-01-04 PROCEDURE — A4216 STERILE WATER/SALINE, 10 ML: HCPCS | Performed by: STUDENT IN AN ORGANIZED HEALTH CARE EDUCATION/TRAINING PROGRAM

## 2022-01-04 PROCEDURE — 94761 N-INVAS EAR/PLS OXIMETRY MLT: CPT

## 2022-01-04 PROCEDURE — 36415 COLL VENOUS BLD VENIPUNCTURE: CPT | Performed by: STUDENT IN AN ORGANIZED HEALTH CARE EDUCATION/TRAINING PROGRAM

## 2022-01-04 PROCEDURE — 63600175 PHARM REV CODE 636 W HCPCS: Performed by: STUDENT IN AN ORGANIZED HEALTH CARE EDUCATION/TRAINING PROGRAM

## 2022-01-04 PROCEDURE — 83735 ASSAY OF MAGNESIUM: CPT | Performed by: STUDENT IN AN ORGANIZED HEALTH CARE EDUCATION/TRAINING PROGRAM

## 2022-01-04 PROCEDURE — 85025 COMPLETE CBC W/AUTO DIFF WBC: CPT | Performed by: STUDENT IN AN ORGANIZED HEALTH CARE EDUCATION/TRAINING PROGRAM

## 2022-01-04 RX ORDER — ENOXAPARIN SODIUM 100 MG/ML
40 INJECTION SUBCUTANEOUS EVERY 24 HOURS
Status: DISCONTINUED | OUTPATIENT
Start: 2022-01-04 | End: 2022-01-04 | Stop reason: HOSPADM

## 2022-01-04 RX ORDER — LANOLIN ALCOHOL/MO/W.PET/CERES
800 CREAM (GRAM) TOPICAL EVERY 4 HOURS
Status: COMPLETED | OUTPATIENT
Start: 2022-01-04 | End: 2022-01-04

## 2022-01-04 RX ADMIN — Medication 800 MG: at 10:01

## 2022-01-04 RX ADMIN — ENOXAPARIN SODIUM 40 MG: 100 INJECTION SUBCUTANEOUS at 06:01

## 2022-01-04 RX ADMIN — LEVOTHYROXINE SODIUM 75 MCG: 75 TABLET ORAL at 06:01

## 2022-01-04 RX ADMIN — ATORVASTATIN CALCIUM 40 MG: 20 TABLET, FILM COATED ORAL at 10:01

## 2022-01-04 RX ADMIN — VALACYCLOVIR HYDROCHLORIDE 1000 MG: 500 TABLET, FILM COATED ORAL at 10:01

## 2022-01-04 RX ADMIN — PANTOPRAZOLE SODIUM 20 MG: 20 TABLET, DELAYED RELEASE ORAL at 10:01

## 2022-01-04 RX ADMIN — DULOXETINE 30 MG: 30 CAPSULE, DELAYED RELEASE ORAL at 10:01

## 2022-01-04 RX ADMIN — MONTELUKAST 10 MG: 10 TABLET, FILM COATED ORAL at 10:01

## 2022-01-04 RX ADMIN — Medication 800 MG: at 11:01

## 2022-01-04 RX ADMIN — Medication 3 ML: at 10:01

## 2022-01-04 NOTE — DISCHARGE SUMMARY
[facility]  General Surgery  Discharge Summary      Patient Name: Anayeli Judd  MRN: [id number]  Admission Date: 1/1/2022  Hospital Length of Stay: 1 days  Discharge Date and Time:  01/04/2022 7:21 AM  Attending Physician: Malick Juarez MD   Discharging Provider: Canelo Grover PA-C  Primary Care Provider: Rocio Mc MD     HPI:   Anayeli Judd is a 76 y.o. female with hx of GERD, HLD, hypothyroidism, DM II, IBS, transverse colon cancer s/p resection (2010), multiple SBO s/p ex lap x2, SBR, urinary incontinence who presents to the ED with abdominal pain and nausea. She has been worked up for chronic abdominal pain and diarrhea and has been admitted multiple times.      Her SBO history includes ex lap with Dr. Silvestre in 2014 - had SBRx2 and enterolysis after failed conservative management. Then again in 2017, underwent ex lap with Dr. Ward. Had small bowel repair x 14 and placement of G tube. She had dense adhesions that required lysis for >2 hours.      Recently she had shingles and a fall about a week ago. Also with arm soreness which she sustained while sleeping and dreaming of playing tennis.     She has had worsening diarrhea for the past 5 days then noted a change to harder stool over the past day. Began to have abdominal pain and nausea this afternoon. No emesis  CT demonstrates possible SBO which is similar to her prior admissions, she does have a significant stool burden in her colon     Last BM yesterday, passing flatus. Decreased PO intake, feels she cannot eat        No current facility-administered medications on file prior to encounter.       * No surgery found *     Hospital Course:   Patient was admitted to the general surgery service. she was made NPO, given IVF, as well as pain and nausea control. Given enemas and gastrograffin challenge with movement of contrast into rectum and ROBF.  Patient was HDS throughout admission. By the time of discharge,  she was meeting all discharge milestones, tolerating a diet without nausea or vomiting, pain was well controlled with oral medications. Voiding appropriately. On hospital day 3, the patient was discharged home. The patient will follow up in in her PCP's clinic in 2 weeks as well as her GI doctor (message sent to clinic for scheduling). Discussed POC and ED precautions with patient. Patient verbalized understanding and is agreeable to plan. All questions answered.    Please see hospital and op notes for further detail regarding patient's admission.    Patient's discharge was discussed with Dr. Juarez.       Consults (From admission, onward)        Status Ordering Provider     Inpatient consult to General surgery  Once        Provider:  (Not yet assigned)    Completed YUNI GUERRERO        Physical Exam  Vitals and nursing note reviewed.   Constitutional:       General: She is not in acute distress.     Appearance: She is not diaphoretic.      Comments: Room air   HENT:      Head: Normocephalic and atraumatic.      Mouth/Throat:      Mouth: Mucous membranes are moist.      Pharynx: Oropharynx is clear.   Eyes:      Extraocular Movements: Extraocular movements intact.      Conjunctiva/sclera: Conjunctivae normal.   Cardiovascular:      Rate and Rhythm: Normal rate.   Pulmonary:      Effort: Pulmonary effort is normal. No respiratory distress.   Abdominal:      General: There is no distension.      Palpations: Abdomen is soft.      Tenderness: There is no abdominal tenderness. There is no guarding or rebound.      Comments: Well healed surgical scar noted   Musculoskeletal:         General: No deformity.      Cervical back: Normal range of motion.   Skin:     General: Skin is warm and dry.   Neurological:      Mental Status: She is alert and oriented to person, place, and time.           Indwelling Lines/Drains at time of discharge:   Lines/Drains/Airways     None                 Significant Diagnostic Studies: Labs:    CMP   Recent Labs   Lab 01/03/22  0356 01/04/22  0511    135*   K 4.1 3.7    103   CO2 26 24   * 127*   BUN 8 8   CREATININE 0.7 0.7   CALCIUM 8.7 8.8   PROT 5.1* 5.3*   ALBUMIN 2.9* 3.0*   BILITOT 1.4* 1.4*   ALKPHOS 80 111   AST 31 36   ALT 33 33   ANIONGAP 7* 8   ESTGFRAFRICA >60.0 >60.0   EGFRNONAA >60.0 >60.0   , CBC   Recent Labs   Lab 01/03/22  0356 01/03/22  0356 01/04/22  0511   WBC 3.45*  --  4.00   HGB 10.5*  --  9.6*   HCT 32.5*   < > 29.1*   PLT 62*  --  65*    < > = values in this interval not displayed.    and All labs within the past 24 hours have been reviewed  Radiology:  X-Ray Abdomen AP 1 View [[id number]] Resulted: 01/1943   Order Status: Sent Updated: 01/1943   X-Ray Abdomen AP 1 View [[id number]] Resulted: 01/03/22 1555   Order Status: Completed Updated: 01/03/22 1558   Narrative:     EXAMINATION:   XR ABDOMEN AP 1 VIEW     CLINICAL HISTORY:   Gastrograffin challenge 4 hours. Please comment on movement of contrast;     FINDINGS:   Contrast has now moved distally to the transverse descending sigmoid colon and rectum.  No obstruction or leak seen.       Electronically signed by: Carlos Mccarthy MD   Date: 01/03/2022   Time: 15:55   CT Abdomen Pelvis With Contrast [[id number]] Resulted: 01/02/22 0209   Order Status: Completed Updated: 01/02/22 0212   Narrative:     EXAMINATION:   CT ABDOMEN PELVIS WITH CONTRAST     CLINICAL HISTORY:   diffuse abd pain, hx multiple SBO in the past;     TECHNIQUE:   Axial images of the abdomen and pelvis were acquired  after the use of 75 cc Ctad530 IV contrast.  Coronal and sagittal reconstructions were also obtained     COMPARISON:   CT abdomen pelvis 10/15/2021, 05/14/2021, 02/26/2021     FINDINGS:   Heart: Normal in size. No pericardial effusion. Multi-vessel calcific coronary atherosclerosis.     Lungs: Mild mostly dependent subsegmental atelectasis.  No consolidation.  No pleural fluid or pneumothorax.     Liver: Normal in size  and contour.  No focal hepatic lesion.     Gallbladder: Surgically absent.     Bile Ducts: No evidence of dilated ducts.     Pancreas: No mass or peripancreatic fat stranding.     Spleen: Mildly enlarged measuring up to 11.6 cm in AP dimension.     Stomach and duodenum: Unremarkable.     Adrenals: Unremarkable.     Kidneys/ Ureters: Normal in size and location. Normal enhancement.  Mild right hydronephrosis, similar to prior.  Left hydronephrosis.  No renal stones.  No ureteral dilatation.  Stable right renal hypodensities too small to characterize.     Bladder: No evidence of wall thickening.     Reproductive organs: Uterus surgically absent.     Bowel/Mesentery: Postoperative change of small bowel resections with dilation of the small bowel up to 5.8 cm at an anastomosis in the right abdomen (axial series 2, image 75) and dilation of the small bowel up to 4.9 cm at an anastomosis in the mid abdomen (axial series 2, image 108).  Dilation appears increased as compared to CT 10/15/2021.  Small bowel is otherwise normal in caliber with no evidence of obstruction. No evidence of inflammation or wall thickening.  Appendix not definitively visualized, surgically absent per history.  Colon demonstrates no focal wall thickening.  Moderate amount of stool throughout the colon     Peritoneum: No intraperitoneal free air or fluid     Lymph nodes: No retroperitoneal lymphadenopathy.     Vasculature: No aneurysm. Mild calcific atherosclerosis.     Abdominal wall:  Unremarkable.  Prominent collateral vessels in the lower abdominal wall.     Bones: Grade 2 anterolisthesis L5 on S1 with partial osseous fusion at this level and bilateral L5 pars defects, unchanged.  Degenerative changes, similar to prior.  No acute fracture. No suspicious osseous lesions.    Impression:       1. Dilated small bowel at the site of 2 small bowel-small bowel anastomoses as detailed above.  Dilation of these areas is increased as compared to CT  10/15/2021.  Remaining small bowel is normal in caliber.  Findings again suggest partial small bowel obstruction.  Focal transition point not identified.  Dilated loops in this region appears somewhat clustered close to the anterior abdominal wall with there is increased soft tissue thickening present along the peritoneal surface underneath and incision site.  Suspect likely adhesions, possibly complex adhesions.   2. Moderate amount of stool in the colon.   3. Mild splenomegaly.   4. Stable mild right hydronephrosis   5. Additional findings as above.     Electronically signed by resident: Zana Wilson   Date: 01/02/2022   Time: 01:18     Electronically signed by: Juan Pablo Sung MD   Date: 01/02/2022   Time: 02:09   CT Head Without Contrast [[id number]] Resulted: 01/02/22 0132   Order Status: Completed Updated: 01/02/22 0135   Narrative:     EXAMINATION:   CT HEAD WITHOUT CONTRAST     CLINICAL HISTORY:   Head trauma, intracranial venous injury suspected;     TECHNIQUE:   Low dose axial images were obtained through the head.  Coronal and sagittal reformations were also performed. Contrast was not administered.     COMPARISON:   MRI brain 10/21/2021, head CT 01/17/2021     FINDINGS:   There is no acute intracranial hemorrhage, hydrocephalus, midline shift or mass effect. Gray-white matter differentiation appears maintained. The basal cisterns are patent. The visualized paranasal sinuses and left mastoid air cells are clear of acute process.  There is minimal opacification of the inferior right mastoid air cells.  The visualized bones of the calvarium demonstrate no acute osseous abnormality.    Impression:       No CT evidence of acute intracranial abnormality. Clinical correlation and further evaluation as warranted.       Electronically signed by: Martha Zuluaga MD   Date: 01/02/2022   Time: 01:32   X-Ray Elbow Complete Left [[id number]] Resulted: 01/01/22 2314   Order Status: Completed Updated: 01/01/22 2311    Narrative:     EXAMINATION:   XR ELBOW COMPLETE 3 VIEW LEFT     CLINICAL HISTORY:   Pain in left elbow     TECHNIQUE:   AP, lateral, and oblique views of the left elbow were performed.     COMPARISON:   None     FINDINGS:   No fractures or dislocations.  Unremarkable visualized bony structures. No fat pad elevation.    Impression:       There is no evidence of fracture or subluxation.          Pending Diagnostic Studies:     Procedure Component Value Units Date/Time    X-Ray Abdomen AP 1 View [[id number]] Resulted: 01/1943    Order Status: Sent Lab Status: In process Updated: 01/1943          Final Active Diagnoses:    Diagnosis Date Noted POA    PRINCIPAL PROBLEM:  Irritable bowel syndrome with both constipation and diarrhea [K58.2] 04/05/2013 Yes      Problems Resolved During this Admission:        Discharged Condition: good    Disposition: Home or Self Care    Follow Up:   Follow-up Information     Rocio Mc MD In 2 weeks.    Specialty: Internal Medicine  Why: As needed  Contact information:  [address] [place] [id number]  [phone]                         Patient Instructions:      Ambulatory referral/consult to Gastroenterology   Standing Status: Future   Referral Priority: Routine Referral Type: Consultation   Referral Reason: Specialty Services Required   Requested Specialty: Gastroenterology   Number of Visits Requested: 1     Diet Adult Regular     Notify your health care provider if you experience any of the following:  temperature >100.4     Notify your health care provider if you experience any of the following:  persistent nausea and vomiting or diarrhea     Activity as tolerated       Medications:  Reconciled Home Medications:      Medication List      CHANGE how you take these medications    * lancets Misc  Commonly known as: ONETOUCH ULTRASOFT LANCETS  Test blood sugar once daily  What changed: Another medication with the same name was added. Make sure  you understand how and when to take each.     * FREESTYLE LANCETS 28 gauge Misc  Generic drug: lancets  TEST DAILY AS DIRECTED  What changed: You were already taking a medication with the same name, and this prescription was added. Make sure you understand how and when to take each.         * This list has 2 medication(s) that are the same as other medications prescribed for you. Read the directions carefully, and ask your doctor or other care provider to review them with you.            CONTINUE taking these medications    ascorbic Acid 500 mg Cpsr  Commonly known as: VITAMIN C  Take 500 mg by mouth once daily.     atorvastatin 40 MG tablet  Commonly known as: LIPITOR  TAKE 1 TABLET BY MOUTH  DAILY     azelastine 137 mcg (0.1 %) nasal spray  Commonly known as: ASTELIN  2 sprays (274 mcg total) by Nasal route 2 (two) times daily.     biotin 5,000 mcg Tbdl  Take 1 tablet by mouth once daily.     blood-glucose meter kit  Commonly known as: CONTOUR METER  Please dispense [employer] Contour meter and supplies Use as instructed Test Blood sugar once daily     butalbital-acetaminophen-caffeine -40 mg -40 mg per tablet  Commonly known as: FIORICET, ESGIC  SMARTSI Tablet(s) By Mouth 1 to 3 Times Daily PRN     cholestyramine-aspartame 4 gram Pwpk  Commonly known as: CHOLESTYRAMINE LIGHT  Take 1 packet (4 g total) by mouth daily as needed (Diarrhea).     clotrimazole-betamethasone 1-0.05% cream  Commonly known as: LOTRISONE  Apply topically 2 (two) times daily.     co-enzyme Q-10 30 mg capsule  Take 30 mg by mouth 3 (three) times daily.     cranberry extract 200 mg Cap  Take 1 capsule by mouth once daily.     D-MANNOSE ORAL  Take 1 tablet by mouth once daily.     DULoxetine 30 MG capsule  Commonly known as: CYMBALTA  Take 1 capsule (30 mg total) by mouth 2 (two) times daily.     fluticasone propionate 50 mcg/actuation nasal spray  Commonly known as: FLONASE  2 sprays by Each Nare route daily as needed.     FREESTYLE  EFREN 10 DAY READER Misc  Generic drug: flash glucose scanning reader  TEST as directed     FREESTYLE EFREN 14 DAY SENSOR Kit  Generic drug: flash glucose sensor  1 application by Misc.(Non-Drug; Combo Route) route every 14 (fourteen) days. Disp 30 or 90 day refill     FREESTYLE LITE STRIPS Strp  Generic drug: blood sugar diagnostic  TEST DAILY AS DIRECTED     hydrocortisone 2.5 % cream  Apply topically 2 (two) times daily as needed.     levothyroxine 75 MCG tablet  Commonly known as: SYNTHROID  Take 1 tablet (75 mcg total) by mouth before breakfast.     magnesium 30 mg Tab  Take 500 capsules by mouth.     montelukast 10 mg tablet  Commonly known as: SINGULAIR  TAKE 1 TABLET BY MOUTH  DAILY     MYRBETRIQ 50 mg Tb24  Generic drug: mirabegron  Take 1 tablet (50 mg total) by mouth once daily.     omega 3-dha-epa-fish oil 1,200 (144-216) mg Cap  Take 1 capsule by mouth once daily.     ondansetron 8 MG Tbdl  Commonly known as: ZOFRAN-ODT  Take 1 tablet (8 mg total) by mouth every 8 (eight) hours as needed (nausea).     pantoprazole 20 MG tablet  Commonly known as: PROTONIX  Take 1 tablet (20 mg total) by mouth once daily.     PREMARIN vaginal cream  Generic drug: conjugated estrogens  INSERT 1/2 GRAM VAGINALLY  TWICE WEEKLY     promethazine-codeine 6.25-10 mg/5 ml 6.25-10 mg/5 mL syrup  Commonly known as: PHENERGAN with CODEINE  Take 5 mLs by mouth every 4 to 6 hours as needed for Cough.     valACYclovir 1000 MG tablet  Commonly known as: VALTREX  Take 1 tablet (1,000 mg total) by mouth 2 (two) times daily.     vitamin D 1000 units Tab  Commonly known as: VITAMIN D3  Take 185 mg by mouth once daily. D3            Patient was seen and examined on the date of discharge and determined to be suitable for discharge.  Total time spent preparing discharge services: 25 minutes.  Time was spent speaking with consultants and case management, reviewing records, and/or discussing the plan of care with patient/family.        Canelo  MAY Grover PA-C  General Surgery   [facility]

## 2022-01-04 NOTE — TELEPHONE ENCOUNTER
----- Message from Canelo Grover PA-C sent at 1/4/2022  7:23 AM CST -----  Good morning,    This patient is a patient of Dr. Jefferson's and was discharged from the hospital today. She needs follow up with GI in 2-3 weeks.     Thank you,     Canelo Grover PA-C

## 2022-01-04 NOTE — SUBJECTIVE & OBJECTIVE
Interval History: BM noted. Patient notes significant improvement. Able to tolerate liquids and solids w/o N/V. Disposition planned for today with outpatient GI follow up.     Medications:  Continuous Infusions:  Scheduled Meds:   atorvastatin  40 mg Oral Daily    DULoxetine  30 mg Oral BID    enoxaparin  40 mg Subcutaneous Daily    levothyroxine  75 mcg Oral Before breakfast    montelukast  10 mg Oral Daily    pantoprazole  20 mg Oral Daily    sodium chloride 0.9%  3 mL Intravenous Q8H    valACYclovir  1,000 mg Oral BID     PRN Meds:acetaminophen, butalbital-acetaminophen-caffeine -40 mg, HYDROcodone-acetaminophen, ondansetron     Review of patient's allergies indicates:   Allergen Reactions    Dilaudid [hydromorphone (bulk)] Other (See Comments)     Oversedating, head burning. Pt prefers to avoid.       Codeine Nausea And Vomiting     Other reaction(s): Itching    Dilaudid [hydromorphone]     Percocet  [oxycodone-acetaminophen]      Other reaction(s): Itching    Sulfa (sulfonamide antibiotics)      Other reaction(s): Nausea  Other reaction(s): Itching     Objective:     Vital Signs (Most Recent):  Temp: 98.1 °F (36.7 °C) (01/04/22 0059)  Pulse: 69 (01/04/22 0059)  Resp: 20 (01/04/22 0059)  BP: (!) 112/56 (01/04/22 0059)  SpO2: 97 % (01/04/22 0059) Vital Signs (24h Range):  Temp:  [97.4 °F (36.3 °C)-98.1 °F (36.7 °C)] 98.1 °F (36.7 °C)  Pulse:  [65-71] 69  Resp:  [16-20] 20  SpO2:  [92 %-97 %] 97 %  BP: (106-113)/(52-59) 112/56     Weight: 56.7 kg (125 lb)  Body mass index is 22.86 kg/m².    Intake/Output - Last 3 Shifts       01/02 0700  01/03 0659 01/03 0700 01/04 0659 01/04 0700 01/05 0659    P.O. 0      IV Piggyback       Total Intake(mL/kg) 0 (0)      Urine (mL/kg/hr) 2 (0)      Stool 0      Total Output 2      Net -2             Urine Occurrence 2 x 3 x     Stool Occurrence 1 x 3 x           Physical Exam  Constitutional:       Appearance: Normal appearance.   HENT:      Head:  Normocephalic and atraumatic.   Eyes:      Extraocular Movements: Extraocular movements intact.   Cardiovascular:      Rate and Rhythm: Normal rate and regular rhythm.      Pulses: Normal pulses.   Pulmonary:      Effort: Pulmonary effort is normal. No respiratory distress.   Abdominal:      General: There is no distension.      Palpations: Abdomen is soft.      Tenderness: There is no abdominal tenderness.   Musculoskeletal:         General: Normal range of motion.      Cervical back: Normal range of motion.   Skin:     General: Skin is warm and dry.   Neurological:      General: No focal deficit present.      Mental Status: She is alert and oriented to person, place, and time.   Psychiatric:         Mood and Affect: Mood normal.         Behavior: Behavior normal.          Significant Labs:  I have reviewed all pertinent lab results within the past 24 hours.  CBC:   Recent Labs   Lab 01/04/22  0511   WBC 4.00   RBC 2.94*   HGB 9.6*   HCT 29.1*   PLT 65*   MCV 99*   MCH 32.7*   MCHC 33.0     CMP:   Recent Labs   Lab 01/04/22  0511   *   CALCIUM 8.8   ALBUMIN 3.0*   PROT 5.3*   *   K 3.7   CO2 24      BUN 8   CREATININE 0.7   ALKPHOS 111   ALT 33   AST 36   BILITOT 1.4*       Significant Diagnostics:  I have reviewed all pertinent imaging results/findings within the past 24 hours.   X Ray Abdomen AP 01/03:   Contrast has now moved distally to the transverse descending sigmoid colon and rectum.  No obstruction or leak seen.     CT Abdomen Pelvis with Contrast 01/02:   1. Dilated small bowel at the site of 2 small bowel-small bowel anastomoses as detailed above.  Dilation of these areas is increased as compared to CT 10/15/2021.  Remaining small bowel is normal in caliber.  Findings again suggest partial small bowel obstruction.  Focal transition point not identified.  Dilated loops in this region appears somewhat clustered close to the anterior abdominal wall with there is increased soft tissue  thickening present along the peritoneal surface underneath and incision site.  Suspect likely adhesions, possibly complex adhesions.   2. Moderate amount of stool in the colon.   3. Mild splenomegaly.   4. Stable mild right hydronephrosis   5. Additional findings as above.

## 2022-01-04 NOTE — NURSING
Pt discharged to home, left via wheelchair to meet daughter at [facility]. All discharge orders reviewed with pt. Who verbalized understanding. Pt left with all belongings.

## 2022-01-04 NOTE — PLAN OF CARE
POC reviewed with pt, verbalized understanding. VSS on RA. AAOX4. Remains free of falls and injury.     - LR @ 100    - Imaging completed for gastrograffin challenge    Tolerating diet, denies nausea. Pain controlled. IS/Acapella bedside. Pt denies chest pain & SOB. TEDs/SCDs in place. No acute events or distress noted. Bed in lowest position, call light in reach, frequent rounds made for safety.    WCTM.      Problem: Adult Inpatient Plan of Care  Goal: Plan of Care Review  Outcome: Ongoing, Progressing  Goal: Patient-Specific Goal (Individualized)  Outcome: Ongoing, Progressing  Goal: Absence of Hospital-Acquired Illness or Injury  Outcome: Ongoing, Progressing  Goal: Optimal Comfort and Wellbeing  Outcome: Ongoing, Progressing  Goal: Readiness for Transition of Care  Outcome: Ongoing, Progressing     Problem: Diabetes Comorbidity  Goal: Blood Glucose Level Within Targeted Range  Outcome: Ongoing, Progressing     Problem: Pain Acute  Goal: Acceptable Pain Control and Functional Ability  Outcome: Ongoing, Progressing

## 2022-01-04 NOTE — PLAN OF CARE
[facility]  Discharge Final Note    Primary Care Provider: Rocio Mc MD    Expected Discharge Date: 1/4/2022       Patient discharging home today with no needs.    Future Appointments   Date Time Provider Department Center   1/10/2022  1:00 PM Ramiro Jefferson MD [facility] GASTRO [facility]   1/10/2022  3:45 PM Ruperto Montero MD [facility] NEURO8 [facility]   1/13/2022  3:00 PM Rocio Mc MD [facility]   1/24/2022  1:30 PM LAB, [place] [facility] [place]         Final Discharge Note (most recent)     Final Note - 01/04/22 1117        Final Note    Assessment Type Final Discharge Note     Anticipated Discharge Disposition Home or Self Care        Post-Acute Status    Post-Acute Authorization Other     Other Status No Post-Acute Service Needs                 Important Message from Medicare  Important Message from Medicare regarding Discharge Appeal Rights: Given to patient/caregiver,Explained to patient/caregiver,Signed/date by patient/caregiver     Date IMM was signed: 01/04/22  Time IMM was signed: 0742    Contact Info     Rocio Mc MD   Specialty: Internal Medicine   Relationship: PCP - General    [address] [place], [place] [id number]   Phone: [phone]       Next Steps: Follow up on 1/13/2022    Instructions: F/U 3:00pm

## 2022-01-05 ENCOUNTER — PATIENT OUTREACH (OUTPATIENT)
Dept: ADMINISTRATIVE | Facility: CLINIC | Age: 77
End: 2022-01-05
Payer: MEDICARE

## 2022-01-05 DIAGNOSIS — K58.9 IRRITABLE BOWEL SYNDROME, UNSPECIFIED TYPE: Primary | ICD-10-CM

## 2022-01-05 PROBLEM — E11.51 TYPE 2 DIABETES MELLITUS WITH DIABETIC PERIPHERAL ANGIOPATHY WITHOUT GANGRENE, WITHOUT LONG-TERM CURRENT USE OF INSULIN: Status: ACTIVE | Noted: 2022-01-05

## 2022-01-05 NOTE — PATIENT INSTRUCTIONS
Patient Education       Irritable Bowel Syndrome Discharge Instructions   About this topic   Irritable bowel syndrome, or IBS, is a common long-term health problem of your belly. IBS causes problems with how your bowel functions. It often causes alternating constipation and diarrhea, but everyones symptoms are different. Some people only have constipation or diarrhea.  It does not cause swelling and does not lead to a more serious problem. The cause of IBS is not clear. There is no cure for IBS. Care focuses on how to control the signs.  Signs may be mild to very bad. The main signs are:  · Belly pain or fullness  · Gassy feeling  · Bloating  · Upset stomach  · Loose or hard stools  · Frequent bowel movements, sometimes triggered by eating  · Mucous in your stool  · Loss of appetite  · Spasms in your belly     What care is needed at home?   Ask your doctor what you need to do when you go home. Make sure you ask questions if you do not understand what the doctor says. This way you will know what you need to do.  Ask your doctor and learn how to cope with stress. This may include:  · Relaxation  · Doing an activity to relieve stress  · Counseling  · Joining a support group  What follow-up care is needed?   Your doctor may ask you to make visits to the office to check on your progress. Be sure to keep these visits.  What drugs may be needed?   The doctor may order drugs to:  · Help with pain  · Control belly muscle spasms  · Treat hard or loose stools  · Fight an infection  Be sure to take all drugs as ordered by your doctor.  Will physical activity be limited?   Physical activity may not be limited. You may be limited by your signs. They may keep you from going to school or work and going to social events. Regular workouts can help improve your bowel function and other signs of IBS.  What changes to diet are needed?   · Keep a diary of what you eat and how your body responds. This way you can figure out if anything  you eat makes your signs better or worse. Talk to your doctor about it.  · Your doctor may suggest these changes. Be sure to pay attention to how your signs change with each one.  ? Eat high-fiber foods like whole grains, fruits, and vegetables.  ? Limit foods that can make you have gas. You may want to avoid onion, cabbage, [place] sprouts, dried beans, lentils, broccoli, and cauliflower.  ? Limit foods and drinks with artificial sweeteners. This includes foods with aspartame, sorbitol, and mannitol.  ? Limit milk products such as milk, ice cream, and some yogurts.  ? Avoid drinks with caffeine, such as coffee and tea. Avoid beer, wine, and mixed drinks (alcohol).  ? Avoid foods that make you feel worse.  When do I need to call the doctor?   · Signs of infection. These include a fever of 100.4°F (38°C) or higher, chills  · Change in your bowel movements that does not go away  · Blood in your stool  · Throwing up and loose stools for more than 24 hours  · Diarrhea that wakes you up from sleep  · Severe or worsening pain in your belly  Teach Back: Helping You Understand   The Teach Back Method helps you understand the information we are giving you. After you talk with the staff, tell them in your own words what you learned. This helps to make sure the staff has described each thing clearly. It also helps to explain things that may have been confusing. Before going home, make sure you can do these:  · I can tell you about my condition.  · I can tell you how I will cope with stress.  · I can tell you how I am going to keep a diary of what foods I eat.  · I can tell you what I will do if I have blood in my bowel movements or a change in my bowel movements that does not go away.  Where can I learn more?   [facility]  https://www.nhs.uk/conditions/irritable-bowel-syndrome-ibs/   Last Reviewed Date   2021-08-16  Consumer Information Use and Disclaimer   This information is not specific medical advice and does not replace  information you receive from your health care provider. This is only a brief summary of general information. It does NOT include all information about conditions, illnesses, injuries, tests, procedures, treatments, therapies, discharge instructions or life-style choices that may apply to you. You must talk with your health care provider for complete information about your health and treatment options. This information should not be used to decide whether or not to accept your health care providers advice, instructions or recommendations. Only your health care provider has the knowledge and training to provide advice that is right for you.  Copyright   Copyright © 2021 [employer], [employer] and its affiliates and/or licensors. All rights reserved.  Patient Education       IBS Diet   About this topic   Irritable bowel syndrome, or IBS, is a common, long-term health problem of your belly. It causes a change in bowel habits but does not lead to a more serious problem. Doctors do not know what causes IBS and there is no cure for it. Care focuses on how to control the signs.  Signs may be mild to very bad. They can last for weeks or months. The main signs are:  · Belly pain and cramping  · Belly fullness  · Gassy feeling  · Bloating  · Upset stomach  · Loose or hard stools  · Loss of appetite  · Mucus in your stool  · Feeling like you havent finished a bowel movement  What drugs may be needed?   · The doctor may order drugs to:  ? Lessen diarrhea  ? Lessen gas  ? Keep bowels regular  · Your doctor or dietitian may suggest a vitamin, mineral, or probiotic supplement. Your doctor may also suggest slowly adding a fiber supplement.  What changes to diet are needed?   · Eat a proper diet in smaller portions. Foods higher in fat can cause and worsen diarrhea and belly pain. You may be able to eat these foods if you are not having any symptoms.  · Ask your doctor if you should increase fiber in your diet. If so, add more fiber to  your diet slowly. Adding fiber too fast can make your symptoms worse.  · Take care of your mental health. Talk to your doctor about relaxation techniques. This will help you manage your stress levels.  What foods are good to eat?   · Tender meats made without added fat. This includes lean beef and pork, poultry, fish, eggs, soy products like tofu, and smooth nut butters.  · Dairy products, such as buttermilk; fat-free and low-fat milk; yogurt; aged cheese, such as cheddar, Parmesan, Provolone, or Swiss; and low-fat ice cream. You may need to choose lactose-free dairy products if you have lactose intolerance or diarrhea.  · All ready-to-eat or cooked grain foods. This includes whole grain bread, whole grain pasta, brown rice, and oats.  · Any vegetables that your body tolerates. Many people find they can eat green beans, carrots, butternut squash, and spinach.  · All fruits and fruit juice except prune, apple, and grape juice.  · Choose oils more often than solid fats. Limit fats to less than 8 teaspoons a day.  · Water, decaffeinated coffee or tea, and sports drinks.  What foods should be limited or avoided?   · Avoid fatty foods like beef, pork, and other meats marbled with fat or poultry with the skin. Lunch meats, bologna, hot dogs, finney and sausage are all high fat foods to avoid. Also stay away from fried meats or fish.  · You may need to avoid dried beans and peas if they cause gas.  · Avoid whole milk, cream, sour cream, and ice cream.  · Avoid foods with grains that cause symptoms.  · Limit vegetables to 1/2 cup serving to reduce symptoms in the beginning. Avoid any vegetables that you cannot tolerate. You may need to avoid vegetables that cause gas. These include broccoli, [place] sprouts, cabbage, cauliflower, greens, peas, lima beans, celery, and onions.  · Limit to 1/2 cup or 1 piece of fruit at each meal to reduce symptoms in the beginning. Avoid prune, apple, or grape juice. Avoid any juice sweetened  with sorbitol. You may have symptoms if you drink more than 2 cups of fruit juice, due to the amount of fructose.  · Avoid drinks with caffeine or those sweetened with high-fructose corn syrup or sorbitol. These are things like cola, coffee, tea, or carbonated beverages.  · Limit high-fat desserts, such as pastries, cakes, cookies, pie, and ice cream. Avoid honey or any dessert sweetened with high-fructose corn syrup or sorbitol.  · Limit acidic, spicy, fried, or greasy foods.  · Avoid alcohol, including beer, wine, and mixed drinks.  Helpful tips   · Drink plenty of fluids.  · Eat meals and snacks on a regular schedule. Do not skip meals. Aim for 5 or 6 small meals per day. Avoid eating a large meal all at once.  · Keep a food diary. This helps you track which foods or drinks cause you to have symptoms. Add new foods in small amounts and track how you feel.  · Read the nutrition facts label. This will help you determine what is in the food you eat.  Where can I learn more?   [employer]y [employer]etics  https://www.eatright.org/health/wellness/digestive-health/irritable-bowel-syndrome   Last Reviewed Date   2021-10-11  Consumer Information Use and Disclaimer   This information is not specific medical advice and does not replace information you receive from your health care provider. This is only a brief summary of general information. It does NOT include all information about conditions, illnesses, injuries, tests, procedures, treatments, therapies, discharge instructions or life-style choices that may apply to you. You must talk with your health care provider for complete information about your health and treatment options. This information should not be used to decide whether or not to accept your health care providers advice, instructions or recommendations. Only your health care provider has the knowledge and training to provide advice that is right for you.  Copyright   Copyright © 2021 [employer]  and its affiliates and/or licensors. All rights reserved.  Kelly teaching reviewed with Anayeli Hustonedwin's daughter  . Anayeli Judd's daughter   verbalized understanding.    Education was provided based on the patient's discharge diagnosis using the attached [facility] patient education as a reference.

## 2022-01-05 NOTE — PROGRESS NOTES
C3 nurse spoke with Anayeli Judd's daughters Saran.    for a [facility] post hospital discharge follow up call. NP referral placed for [facility].

## 2022-01-06 ENCOUNTER — PATIENT MESSAGE (OUTPATIENT)
Dept: HEMATOLOGY/ONCOLOGY | Facility: CLINIC | Age: 77
End: 2022-01-06
Payer: MEDICARE

## 2022-01-10 ENCOUNTER — OFFICE VISIT (OUTPATIENT)
Dept: NEUROLOGY | Facility: CLINIC | Age: 77
End: 2022-01-10
Payer: MEDICARE

## 2022-01-10 ENCOUNTER — OFFICE VISIT (OUTPATIENT)
Dept: GASTROENTEROLOGY | Facility: CLINIC | Age: 77
End: 2022-01-10
Payer: MEDICARE

## 2022-01-10 VITALS
SYSTOLIC BLOOD PRESSURE: 142 MMHG | HEART RATE: 62 BPM | WEIGHT: 131.63 LBS | BODY MASS INDEX: 24.22 KG/M2 | HEIGHT: 62 IN | DIASTOLIC BLOOD PRESSURE: 66 MMHG

## 2022-01-10 VITALS — BODY MASS INDEX: 23 KG/M2 | HEIGHT: 62 IN | WEIGHT: 125 LBS

## 2022-01-10 DIAGNOSIS — G20.C PARKINSONISM, UNSPECIFIED PARKINSONISM TYPE: ICD-10-CM

## 2022-01-10 DIAGNOSIS — G31.84 MILD COGNITIVE IMPAIRMENT: Primary | ICD-10-CM

## 2022-01-10 DIAGNOSIS — K56.600 PARTIAL SMALL BOWEL OBSTRUCTION: ICD-10-CM

## 2022-01-10 DIAGNOSIS — D64.9 LOW HEMOGLOBIN: ICD-10-CM

## 2022-01-10 DIAGNOSIS — D12.6 COLON ADENOMA: ICD-10-CM

## 2022-01-10 DIAGNOSIS — K56.609 SBO (SMALL BOWEL OBSTRUCTION): ICD-10-CM

## 2022-01-10 DIAGNOSIS — K59.09 CONSTIPATION, CHRONIC: Primary | ICD-10-CM

## 2022-01-10 DIAGNOSIS — Z85.038 HISTORY OF COLON CANCER: ICD-10-CM

## 2022-01-10 DIAGNOSIS — D46.9 MYELODYSPLASTIC SYNDROME: ICD-10-CM

## 2022-01-10 PROCEDURE — 99999 PR PBB SHADOW E&M-EST. PATIENT-LVL III: CPT | Mod: PBBFAC,,, | Performed by: PSYCHIATRY & NEUROLOGY

## 2022-01-10 PROCEDURE — 99214 OFFICE O/P EST MOD 30 MIN: CPT | Mod: 95,,, | Performed by: INTERNAL MEDICINE

## 2022-01-10 PROCEDURE — 99417 PR PROLONGED SVC, OUTPT, W/WO DIRECT PT CONTACT,  EA ADDTL 15 MIN: ICD-10-PCS | Mod: S$PBB,,, | Performed by: PSYCHIATRY & NEUROLOGY

## 2022-01-10 PROCEDURE — 99213 OFFICE O/P EST LOW 20 MIN: CPT | Mod: PBBFAC | Performed by: PSYCHIATRY & NEUROLOGY

## 2022-01-10 PROCEDURE — 99215 PR OFFICE/OUTPT VISIT, EST, LEVL V, 40-54 MIN: ICD-10-PCS | Mod: S$PBB,,, | Performed by: PSYCHIATRY & NEUROLOGY

## 2022-01-10 PROCEDURE — 99999 PR PBB SHADOW E&M-EST. PATIENT-LVL III: ICD-10-PCS | Mod: PBBFAC,,, | Performed by: PSYCHIATRY & NEUROLOGY

## 2022-01-10 PROCEDURE — 99417 PROLNG OP E/M EACH 15 MIN: CPT | Mod: S$PBB,,, | Performed by: PSYCHIATRY & NEUROLOGY

## 2022-01-10 PROCEDURE — 99214 PR OFFICE/OUTPT VISIT, EST, LEVL IV, 30-39 MIN: ICD-10-PCS | Mod: 95,,, | Performed by: INTERNAL MEDICINE

## 2022-01-10 PROCEDURE — 99215 OFFICE O/P EST HI 40 MIN: CPT | Mod: S$PBB,,, | Performed by: PSYCHIATRY & NEUROLOGY

## 2022-01-10 RX ORDER — POLYETHYLENE GLYCOL 3350 17 G/17G
17 POWDER, FOR SOLUTION ORAL
Qty: 1530 G | Refills: 1 | Status: ON HOLD | OUTPATIENT
Start: 2022-01-10 | End: 2022-06-11 | Stop reason: HOSPADM

## 2022-01-10 NOTE — PROGRESS NOTES
The patient location is: At home  The chief complaint leading to consultation is: Constipation    Visit type:  For telemedicine video visit    Face to Face time with patient: 30 minutes of total time spent on the encounter, which includes face to face time and non-face to face time preparing to see the patient (eg, review of tests), Obtaining and/or reviewing separately obtained history, Documenting clinical information in the electronic or other health record, Independently interpreting results (not separately reported) and communicating results to the patient/family/caregiver, or Care coordination (not separately reported).     Each patient to whom he or she provides medical services by telemedicine is:  (1) informed of the relationship between the physician and patient and the respective role of any other health care provider with respect to management of the patient; and (2) notified that he or she may decline to receive medical services by telemedicine and may withdraw from such care at any time.    Notes:         [facility] Consultation Note    Reason for Consult:  The primary encounter diagnosis was Constipation, chronic. Diagnoses of SBO (small bowel obstruction), Partial small bowel obstruction, Myelodysplastic syndrome, Low hemoglobin, History of colon cancer, and Colon adenoma were also pertinent to this visit.    PCP:   Rocio Mc   [address] / [place] [id number]    Referring MD:  Ayesha Hendrickson Md  [address] [place] [address]    Initial History of Present Illness (HPI):  This is a 77 y.o. female here for evaluation of new onset constipation and what was initially thought in the ER as a possible partial small-bowel obstruction however general surgery saw patient they said no evidence of small-bowel obstruction constipation.  Prior to this patient was being evaluated and treated for chronic diarrhea with cholestyramine.  But she had been on  cholestyramine for few weeks prior to that hospital motion January 1, 2022.  Patient has an extensive past medical history including partial colectomy and a cholecystectomy which could contribute it to diarrhea recent stool studies are negative she is on a metformin and a PPI both could cause a diarrhea in some patients.  She is here with her granddaughter who is taking notes who will come pending her to her primary care doctor's appointment we did talk about the potential post cholecystectomy syndrome that people get after they have a gallbladder out and the have diarrhea and cholestyramine or Questran can be a help with this however this is a high maintenance medication in that has to be taken 4 hours before or 4 hours after her other medications of the Questran does not bind her other medications and prevent them from being absorbed currently she is only taking her medicines twice daily in the morning in the evening so if she wanted to try cholestyramine she could try it 4 hours after her morning medications but needs to be 4 hours prior to her evening medications and I would not recommend she take it more than once a day she could start at a full packet 4 g her she could start at a half a packet 4 g. She says Imodium makes her too constipated that is why she does not take Imodium certainly a side effect of cholestyramine or Questran would be constipation so she could take less of this medication but no more than 1 packet a day.  She is not taking any NSAIDs so getting off her PPI would be a possibility she may have hyper acid secreting state getting off she should dose reduce it 1st she can discuss this with her primary care doctor but she could drop her medication to pantoprazole Protonix 20 milligrams once daily and then go to every other day for few weeks and then every 3rd day for few weeks and then maybe transition over to an H2 blocker like Pepcid over-the-counter for little bit and try to get off  completely she does have a small hiatal hernia but very small in no evidence of erosive esophagitis or Taylor's esophagus.  Last took cholestyramine December 15, 2021. Patient feels like she is really constipated occasionally taking do a lax she is not on cholestyramine currently she is not taking MiraLax we do recommend she do MiraLax 17 g a cap full in 12 oz water once daily before dinner as needed for constipation and avoid cholestyramine currently is at a make constipation worse.     Abdominal pain - no currently but constipated  Reflux - no  Dysphagia - no   Bowel habits -constipated  GI bleeding - none  NSAID usage - none     Interval HPI 2021:  The patient's last visit with me was on 10/15/2021.        ROS:  Constitutional: No fevers, chills, No weight loss  ENT:  As heartburn no dysphagia no odynophagia no hoarseness  CV: No chest pain, no palpitation  Pulm: No cough, No shortness of breath, no wheezing  Ophtho: No vision changes  GI: see HPI  Derm: No rash, no itching  Heme: No lymphadenopathy  MSK:  Some arthritis  : No dysuria, No hematuria  Endo: No hot or cold intolerance  Neuro: No syncope, No seizure, no strokes  Psych: No uncontrolled anxiety, No uncontrolled depression        Medical History:  has a past medical history of Abdominal pain, Allergic rhinitis, Arthritis, Blood platelet disorder, Blood platelet disorder, Blood transfusion, Bowel obstruction, Cervical radiculopathy, Colon cancer (), Diabetes mellitus, Diarrhea, Falls (2020), Family history of breast cancer, Family history of colon cancer, Fatty liver, GERD (gastroesophageal reflux disease), History of shingles, Hyperlipidemia, Hypothyroidism, Irritable bowel syndrome, Microscopic colitis, Raynaud phenomenon, Raynaud's disease, and Type 2 diabetes mellitus.    Surgical History:  has a past surgical history that includes Hysterectomy; Appendectomy; Toe Surgery; Tonsillectomy;  section; posterolateral fusion with  autograft bone and Eun mineralized bone matrix (2/1/13); Carpal tunnel release; Trigger finger release; Back surgery; Cholecystectomy (1965); Colectomy (06/27/2011); Colonoscopy (N/A, 7/27/2017); Eye surgery; Esophagogastroduodenoscopy (N/A, 11/16/2018); Endoscopic ultrasound of upper gastrointestinal tract (N/A, 12/12/2018); Endoscopic ultrasound of upper gastrointestinal tract (N/A, 1/23/2019); Esophagogastroduodenoscopy (N/A, 6/26/2019); Colonoscopy (N/A, 6/26/2019); Fluoroscopic urodynamic study (N/A, 11/9/2021); and Cystoscopy (N/A, 11/9/2021).    Family History: family history includes Alcohol abuse in her brother and brother; Arthritis in her brother and daughter; Asthma in her daughter; Bladder Cancer in her mother; Breast cancer (age of onset: 79) in her sister; Cataracts in her mother; Colon cancer in her father; Colon cancer (age of onset: 70) in her brother; Depression in her daughter and daughter; Glaucoma in her mother; Heart disease in her mother; Heart disease (age of onset: 50) in her father; Hyperlipidemia in her mother; Hypertension in her daughter; Kidney disease in her mother; Parkinsonism in her brother; Stroke (age of onset: 40) in her daughter..     Social History:  reports that she has never smoked. She has never used smokeless tobacco. She reports current alcohol use. She reports that she does not use drugs.    Review of patient's allergies indicates:   Allergen Reactions    Dilaudid [hydromorphone (bulk)] Other (See Comments)     Oversedating, head burning. Pt prefers to avoid.       Codeine Nausea And Vomiting     Other reaction(s): Itching    Dilaudid [hydromorphone]     Percocet  [oxycodone-acetaminophen]      Other reaction(s): Itching    Sulfa (sulfonamide antibiotics)      Other reaction(s): Nausea  Other reaction(s): Itching       Medication List with Changes/Refills   New Medications    POLYETHYLENE GLYCOL (GLYCOLAX) 17 GRAM/DOSE POWDER    Take 17 g by mouth before dinner.    Current Medications    ASCORBIC ACID (VITAMIN C) 500 MG CPSR    Take 500 mg by mouth once daily.     ATORVASTATIN (LIPITOR) 40 MG TABLET    TAKE 1 TABLET BY MOUTH  DAILY    AZELASTINE (ASTELIN) 137 MCG (0.1 %) NASAL SPRAY    2 sprays (274 mcg total) by Nasal route 2 (two) times daily.    BIOTIN 5,000 MCG TBDL    Take 1 tablet by mouth once daily.     BUTALBITAL-ACETAMINOPHEN-CAFFEINE -40 MG (FIORICET, ESGIC) -40 MG PER TABLET    SMARTSI Tablet(s) By Mouth 1 to 3 Times Daily PRN    CHOLESTYRAMINE-ASPARTAME (CHOLESTYRAMINE LIGHT) 4 GRAM PWPK    Take 1 packet (4 g total) by mouth daily as needed (Diarrhea).    CLOTRIMAZOLE-BETAMETHASONE 1-0.05% (LOTRISONE) CREAM    Apply topically 2 (two) times daily.    CO-ENZYME Q-10 30 MG CAPSULE    Take 30 mg by mouth 3 (three) times daily.    CONJUGATED ESTROGENS (PREMARIN) VAGINAL CREAM    INSERT 1/2 GRAM VAGINALLY  TWICE WEEKLY    CRANBERRY EXTRACT 200 MG CAP    Take 1 capsule by mouth once daily.     D-MANNOSE ORAL    Take 1 tablet by mouth once daily.     DULOXETINE (CYMBALTA) 30 MG CAPSULE    Take 1 capsule (30 mg total) by mouth 2 (two) times daily.    FLASH GLUCOSE SENSOR (FREESTYLE EFREN 14 DAY SENSOR) KIT    1 application by Misc.(Non-Drug; Combo Route) route every 14 (fourteen) days. Disp 30 or 90 day refill    FREESTYLE EFREN 10 DAY READER MISC    TEST as directed    FREESTYLE LITE STRIPS STRP    TEST DAILY AS DIRECTED    HYDROCORTISONE 2.5 % CREAM    Apply topically 2 (two) times daily as needed.    LANCETS (FREESTYLE LANCETS) 28 GAUGE MISC    TEST DAILY AS DIRECTED    LEVOTHYROXINE (SYNTHROID) 75 MCG TABLET    Take 1 tablet (75 mcg total) by mouth before breakfast.    MAGNESIUM 30 MG TAB    Take 500 capsules by mouth.     METFORMIN (GLUCOPHAGE) 500 MG TABLET    Take 500 mg by mouth 3 (three) times daily.    MIRABEGRON (MYRBETRIQ) 50 MG TB24    Take 1 tablet (50 mg total) by mouth once daily.    MONTELUKAST (SINGULAIR) 10 MG TABLET    TAKE 1 TABLET BY  "MOUTH  DAILY    OMEGA 3-DHA-EPA-FISH OIL 1,200 (144-216) MG CAP    Take 1 capsule by mouth once daily.     ONDANSETRON (ZOFRAN-ODT) 8 MG TBDL    Take 1 tablet (8 mg total) by mouth every 8 (eight) hours as needed (nausea).    PANTOPRAZOLE (PROTONIX) 20 MG TABLET    Take 1 tablet (20 mg total) by mouth once daily.    PROMETHAZINE-CODEINE 6.25-10 MG/5 ML (PHENERGAN WITH CODEINE) 6.25-10 MG/5 ML SYRUP    Take 5 mLs by mouth every 4 to 6 hours as needed for Cough.    VALACYCLOVIR (VALTREX) 1000 MG TABLET    Take 1 tablet (1,000 mg total) by mouth 2 (two) times daily.    VITAMIN D 1000 UNITS TAB    Take 185 mg by mouth once daily. D3         Objective Findings:    Vital Signs:  Ht 5' 2" (1.575 m)   Wt 56.7 kg (125 lb)   LMP  (LMP Unknown)   BMI 22.86 kg/m²   Body mass index is 22.86 kg/m².    Physical Exam:    Telemedicine video visit  General Appearance: Well appearing in no acute distress  Neurologic:  Alert and oriented x4  Psychiatric:  Normal speech mentation and affect    Labs:  Lab Results   Component Value Date    WBC 4.00 01/04/2022    HGB 9.6 (L) 01/04/2022    HCT 29.1 (L) 01/04/2022    PLT 65 (L) 01/04/2022    CHOL 150 08/04/2020    TRIG 104 08/04/2020    HDL 71 08/04/2020    ALT 33 01/04/2022    AST 36 01/04/2022     (L) 01/04/2022    K 3.7 01/04/2022     01/04/2022    CREATININE 0.7 01/04/2022    BUN 8 01/04/2022    CO2 24 01/04/2022    TSH 2.432 07/06/2021    INR 1.0 05/14/2021    GLUF 148 (H) 08/15/2006    HGBA1C 6.7 (H) 10/16/2021               Medical Decision Making:  Lab work reviewed  Prior EGD colonoscopy images and path personally reviewed by myself  Recent CT scan images personally reviewed by myself  History obtained from patient and caregiver Keya the video visit today  The no to message the primary care doctor about the new onset leg swelling  Constipation talk given  Diarrhea talk given  Difference between MiraLax and cholestyramine talk given  EGD colonoscopy talk " given  Patient says that the doctors in [place] recommended colonoscopy when she gets back to [place]  Assessment:  1. Constipation, chronic    2. SBO (small bowel obstruction)    3. Partial small bowel obstruction    4. Myelodysplastic syndrome    5. Low hemoglobin    6. History of colon cancer    7. Colon adenoma         Recommendations:  1. Lab work today  2. Urgent CT enterography of the small bowel for new onset left upper quadrant pain radiating to her back with a history of  Constipation versus partial small-bowel obstruction with history of colon cancer in the past colon adenomas polyp and multiple  Surgeries for adhesions.  3. Schedule EGD colonoscopy for further evaluation of anemia change in bowel habits constipation is patient has normal bowel habits or diarrhea.  4. Patient will contact her primary care doctor about new onset unilateral leg swelling since yesterday.  5. Patient has neuropsych appointment today at 3:45 a.m. a floor for cognitive evaluation  6. Myelodysplastic syndrome followed by Heme-Onc Zeke Cabezas MDHematology and Oncology  7. Return GI clinic 4 weeks for follow-up sooner if symptoms worsen      Order summary:  Orders Placed This Encounter    Ambulatory referral/consult to General Surgery    polyethylene glycol (GLYCOLAX) 17 gram/dose powder    Case Request Endoscopy: EGD (ESOPHAGOGASTRODUODENOSCOPY), COLONOSCOPY         Thank you so much for allowing me to participate in the care of Anayeli Hardik Jefferson MD

## 2022-01-10 NOTE — Clinical Note
Rodolfo please schedule patient for lab work today [facility] around 3:00 p.m. today she has an appointment with Neurology orders placed  Please schedule patient for urgent CT enterography of the small bowel orders placed  Rodolfo please schedule patient follow-up GI clinic appointment in 4weeks for follow-up of chronic constipation.

## 2022-01-10 NOTE — PROGRESS NOTES
[facility]    Name: Anayeli Judd  : 1945  MRN: [id number]    Date: 1/10/2022      Assessment:     Ms. Judd is a 77 y.o. right handed female with a history of parkinsonism, memory deficits, headache, HLD, DM2, hypothyroidism, colon cancer, myelodysplastic syndrome, B12 deficiency, anxiety, IBS, and GERD who presents to the [facility] due to movement in memory symptoms.  Patient began experiencing hand tremor around early  which occurred with rest. She has subsequently developed bradykinesia, shuffling gait, decreased arm swing, difficulty standing, and orthostatic lightheadedness.  In addition to movement symptoms, the patient has also experienced progressively worsening cognitive symptoms, including difficulty with recalling recent events and conversations, repeating questions, forgetting medications, difficulty with attention and concentration, slowed cognitive processing speed, trouble with word finding, and trouble spelling.  On evaluation today, the neurological exam is notable for parkinsonism.  MoCA was 26/30 with points lost on visuospatial ability (-1), language (-2), and abstraction (-1).  Laboratory studies over the past 6 months are notable for mild macrocytic anemia with normal B12, decreased WBCs, RBCs, and platelets, hypomagnesemia, mildly elevated A1c, and elevated liver enzymes.  MRI brain performed 10/21/2021 is notable for mild generalized volume loss, including the hippocampi bilaterally.  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  The patient has parkinsonism without overt symptoms that would suggest LBD, including visual hallucinations, REM sleep behavior disorder, or pronounced fluctuations in cognition.  I suspect Parkinson's disease is the underlying cause of Ms. Judd's movement and cognitive symptoms.  Neuropsychological testing will help clarify the  diagnosis.    Recommendations:     Workup   Neuropsychological testing pending - ordered by Zehra Rivas    Treatment   Consider starting donepezil as Parkinson's disease and Lewy body disease are both associated with cholinergic deficits.  Recommend starting Donepezil 2.5 mg PO daily for 2 weeks, then 5 mg daily for 2 weeks, then 7.5 mg daily for 2 weeks, then 10 mg daily. Common adverse reactions include nausea, diarrhea, insomnia, vomiting, muscle cramps, fatigue, and decreased appetite. For more information, please see https://dailymed.nlm.nih.gov/dailymed/drugInfo.cfm?setid=[email or web address].   No indication for NMDA antagonist.   Avoid benzodiazepines, anticholinergics, and opioids at these often worsened cognitive impairment.    Referrals  · Home health ordered by Zehra Rivas.     Safety-related   We recommend that a medication management system be put in place to avoid errors in taking medication. Helpful services include [employer] (pillpack.com; free service) and [employer] ([employer]; paid subscription service).   We recommend that Ms. Judd not drive.     Health maintenance    Continue to follow-up with primary care doctor to monitor and treat vascular risk factors.   Recommend performing moderate-intensity cardiovascular exercise as able, ideally 30 minutes per day, 5 days per week.   Recommend staying socially and cognitively active.   Recommend eating a healthy and balanced diet (Mediterranean or DASH diet).    Follow-up: Follow up after neuropsychological testing. I provided Ms. Judd and her daughter, Danielle, with our contact information should they have any questions or concerns.      Ruperto Montero MD   Behavioral Neurology & Neuropsychiatry  [facility]    Subjective:      Chief complaint:  No chief complaint on file.    Consultation requested by: No ref. provider found    History of present illness:  Ms. Judd is a 77 y.o. right handed  female with a history of parkinsonism, memory deficits, headache, HLD, DM2, hypothyroidism, colon cancer, myelodysplastic syndrome, B12 deficiency, anxiety, IBS, and GERD who presents today to the [facility] due to memory deficits. Ms. Judd is accompanied by her daughter, Danielle, who participates in providing history. Additional information is obtained by reviewing available medical records.     Patient began experiencing hand tremor around early .  Initially it was intermittent but has progressively worsened over time.  Tremor is present at rest as well as with action and posture, and occurs bilaterally but is noticeably worse in the right hand.  The patient has had increased difficulty with balance, shuffles when she walks, has difficulty turning, and has difficulty rising from a chair.  She notes that she becomes lightheaded when rising from a seated position.  She has not had any recent falls, but there have been a couple that were notable in the past 1-2 years.  The speed of the patient's movements have slowed dramatically over the past year and her writing has become smaller.  In addition to movement symptoms, the patient has experienced trouble with memory since around early . She has difficulty remembering recent events and conversations, and often repeats questions.  She has difficulty recalling names of people and places, and often has difficulty recalling if she has taken her medication.  She feels that her cognitive processing speed has slowed and she has become more easily distractible.  She says that she often has to reread sentences and paragraphs due to difficulty with concentration.  She reports that she has intermittently depressed mood since the death of her  in , but this does not persist for days/weeks at a time.  She has anxiety at baseline that has not worsened.  She experienced an episode where she woke up and thought she saw her  dog in the  room, however, it is unclear if this was more representative of a hypnopompic hallucination or possibly associated with UTI, which she had at the time.  She is not experienced recurrent hallucinations.    Review of systems for cognition, behavior, motor, sensory, and sleep:  Memory   Difficulty recalling recent events: Yes   Difficulty recalling recent conversations: Yes    Repeats questions and statements: Yes    Forgets medication: Yes    Executive function   Slowed cognitive processing speed: Yes   Difficulty with attention and concentration: Yes - easily distractible, often rereads   Difficulty with judgment and/or problem solving: No   Difficulty with planning and/or organization: No    Language   Difficulty with fluency and sarah: No   Word-finding difficulties: Yes   Spelling difficulties: Yes   Word substitutions: No    Visuospatial ability   Difficulty navigating: Yes   Becomes lost in familiar places: No   Difficulty recognizing objects or faces: No    Behavior   Disinhibition: No   Irritability: No   Apathy: No   Hygiene: No   Appetite: No   Depression: Yes - intermittent,   in , often thinks about that   Anxiety: Yes - has not worsened   Hallucinations: No   Delusions: No    Motor   Difficulty walking: Yes - secondary to scolisos   Imbalance: Yes - intermittent trouble with balance   Falls: Yes   Weakness: No   Trouble with fine motor movements: No   Tremor: Yes - postural    Involuntary muscle movement: No   Difficulty swallowing: No    Sensory   Paresthesia or pain: Yes - chronic abdominal pain   Trouble with vision: No   Trouble with hearing: No    Sleep   Insomnia: No   Snoring: No   Apnea: No   Dream-enactment: Yes - one episode, fell out of bed, hit head    Past Medical History:   Diagnosis Date    Abdominal pain     Allergic rhinitis     Arthritis     Blood platelet disorder     Blood platelet disorder     Blood transfusion     during  delivery and     Bowel obstruction     Cervical radiculopathy     followed by dr reynolds    Colon cancer     transverse colon; resected; Stage IIA (pT3 pN0 MX)    Diabetes mellitus     Diarrhea     Falls 2020    Family history of breast cancer     Family history of colon cancer     Fatty liver     GERD (gastroesophageal reflux disease)     History of shingles     Hyperlipidemia     Hypothyroidism     Irritable bowel syndrome     Microscopic colitis     treated     Raynaud phenomenon     Raynaud's disease     Type 2 diabetes mellitus      Past Surgical History:   Procedure Laterality Date    APPENDECTOMY      BACK SURGERY      CARPAL TUNNEL RELEASE      bilateral      SECTION      CHOLECYSTECTOMY  1965    COLECTOMY  2011    Transverse colon resection by Dr. Aguirre    COLONOSCOPY N/A 2017    Procedure: COLONOSCOPY;  Surgeon: Manjit Alvarez MD;  Location: [facility] (4TH FLR);  Service: Endoscopy;  Laterality: N/A;    COLONOSCOPY N/A 2019    Procedure: COLONOSCOPY;  Surgeon: Ramiro Jefferson MD;  Location: [facility] (4TH FLR);  Service: Endoscopy;  Laterality: N/A;  PM prep    CYSTOSCOPY N/A 2021    Procedure: CYSTOSCOPY;  Surgeon: Viridiana Valenzuela MD;  Location: [facility] 1ST FLR;  Service: Urology;  Laterality: N/A;    ENDOSCOPIC ULTRASOUND OF UPPER GASTROINTESTINAL TRACT N/A 2018    Procedure: ULTRASOUND, UPPER GI TRACT, ENDOSCOPIC;  Surgeon: Jose Hess MD;  Location: [facility];  Service: Endoscopy;  Laterality: N/A;    ENDOSCOPIC ULTRASOUND OF UPPER GASTROINTESTINAL TRACT N/A 2019    Procedure: ULTRASOUND, UPPER GI TRACT, ENDOSCOPIC;  Surgeon: Jose Hess MD;  Location: [facility] (2ND FLR);  Service: Endoscopy;  Laterality: N/A;    ESOPHAGOGASTRODUODENOSCOPY N/A 2018    Procedure: EGD (ESOPHAGOGASTRODUODENOSCOPY);  Surgeon: Angelo Reynolds MD;  Location: [facility] (2ND FLR);  Service: Endoscopy;   Laterality: N/A;    ESOPHAGOGASTRODUODENOSCOPY N/A 6/26/2019    Procedure: EGD (ESOPHAGOGASTRODUODENOSCOPY);  Surgeon: Ramiro Jefferson MD;  Location: [facility] (4TH FLR);  Service: Endoscopy;  Laterality: N/A;    EYE SURGERY      Cataract Removal    FLUOROSCOPIC URODYNAMIC STUDY N/A 11/9/2021    Procedure: URODYNAMIC STUDY, FLUOROSCOPIC;  Surgeon: Viridiana Valenzuela MD;  Location: [facility] FLR;  Service: Urology;  Laterality: N/A;  90 minutes     HYSTERECTOMY      posterolateral fusion with autograft bone and [place] mineralized bone matrix  2/1/13    at [facility] for lumbar spine stenosis    TOE SURGERY      TONSILLECTOMY      TRIGGER FINGER RELEASE       Family History   Problem Relation Age of Onset    Heart disease Father 50        Mi age 50    Colon cancer Father     Bladder Cancer Mother         non smoker    Cataracts Mother     Glaucoma Mother     Heart disease Mother     Hyperlipidemia Mother     Kidney disease Mother     Breast cancer Sister 79    Arthritis Daughter     Asthma Daughter     Depression Daughter     Hypertension Daughter     Stroke Daughter 40    Arthritis Brother     Colon cancer Brother 70    Alcohol abuse Brother     Parkinsonism Brother     Alcohol abuse Brother     Depression Daughter     Celiac disease Neg Hx     Cirrhosis Neg Hx     Colon polyps Neg Hx     Crohn's disease Neg Hx     Cystic fibrosis Neg Hx     Esophageal cancer Neg Hx     Hemochromatosis Neg Hx     Inflammatory bowel disease Neg Hx     Irritable bowel syndrome Neg Hx     Liver cancer Neg Hx     Liver disease Neg Hx     Rectal cancer Neg Hx     Stomach cancer Neg Hx     Ulcerative colitis Neg Hx     Eliazar's disease Neg Hx     Amblyopia Neg Hx     Blindness Neg Hx     Macular degeneration Neg Hx     Retinal detachment Neg Hx     Strabismus Neg Hx     Melanoma Neg Hx      Social History     Socioeconomic History    Marital status:    Tobacco Use    Smoking status:  Never Smoker    Smokeless tobacco: Never Used   Substance and Sexual Activity    Alcohol use: Yes     Comment: socially, hardly ever    Drug use: No    Sexual activity: Never     Birth control/protection: Abstinence   Social History Narrative    , lives alone.  Primary support are her children and friends.     Current Outpatient Medications:     ascorbic Acid (VITAMIN C) 500 mg CpSR, Take 500 mg by mouth once daily. , Disp: , Rfl:     atorvastatin (LIPITOR) 40 MG tablet, TAKE 1 TABLET BY MOUTH  DAILY, Disp: 90 tablet, Rfl: 3    biotin 5,000 mcg TbDL, Take 1 tablet by mouth once daily. , Disp: , Rfl:     butalbital-acetaminophen-caffeine -40 mg (FIORICET, ESGIC) -40 mg per tablet, SMARTSI Tablet(s) By Mouth 1 to 3 Times Daily PRN, Disp: , Rfl:     cholestyramine-aspartame (CHOLESTYRAMINE LIGHT) 4 gram PwPk, Take 1 packet (4 g total) by mouth daily as needed (Diarrhea)., Disp: 90 packet, Rfl: 0    clotrimazole-betamethasone 1-0.05% (LOTRISONE) cream, Apply topically 2 (two) times daily., Disp: , Rfl:     co-enzyme Q-10 30 mg capsule, Take 30 mg by mouth 3 (three) times daily., Disp: , Rfl:     conjugated estrogens (PREMARIN) vaginal cream, INSERT 1/2 GRAM VAGINALLY  TWICE WEEKLY, Disp: 30 g, Rfl: 3    cranberry extract 200 mg Cap, Take 1 capsule by mouth once daily. , Disp: , Rfl:     D-MANNOSE ORAL, Take 1 tablet by mouth once daily. , Disp: , Rfl:     DULoxetine (CYMBALTA) 30 MG capsule, Take 1 capsule (30 mg total) by mouth 2 (two) times daily., Disp: 180 capsule, Rfl: 3    flash glucose sensor (FREESTYLE EFREN 14 DAY SENSOR) Kit, 1 application by Misc.(Non-Drug; Combo Route) route every 14 (fourteen) days. Disp 30 or 90 day refill, Disp: 6 kit, Rfl: 6    FREESTYLE EFREN 10 DAY READER Misc, TEST as directed, Disp: 3 each, Rfl: 3    FREESTYLE LITE STRIPS Strp, TEST DAILY AS DIRECTED, Disp: 50 strip, Rfl: 2    hydrocortisone 2.5 % cream, Apply topically 2 (two) times daily as  "needed., Disp: 1 each, Rfl: 1    levothyroxine (SYNTHROID) 75 MCG tablet, Take 1 tablet (75 mcg total) by mouth before breakfast., Disp: 90 tablet, Rfl: 3    magnesium 30 mg Tab, Take 500 capsules by mouth. , Disp: , Rfl:     metFORMIN (GLUCOPHAGE) 500 MG tablet, Take 500 mg by mouth 3 (three) times daily., Disp: , Rfl:     mirabegron (MYRBETRIQ) 50 mg Tb24, Take 1 tablet (50 mg total) by mouth once daily., Disp: 60 tablet, Rfl: 3    montelukast (SINGULAIR) 10 mg tablet, TAKE 1 TABLET BY MOUTH  DAILY, Disp: 90 tablet, Rfl: 3    omega 3-dha-epa-fish oil 1,200 (144-216) mg Cap, Take 1 capsule by mouth once daily. , Disp: , Rfl:     ondansetron (ZOFRAN-ODT) 8 MG TbDL, Take 1 tablet (8 mg total) by mouth every 8 (eight) hours as needed (nausea)., Disp: 30 tablet, Rfl: 0    pantoprazole (PROTONIX) 20 MG tablet, Take 1 tablet (20 mg total) by mouth once daily., Disp: 30 tablet, Rfl: 2    polyethylene glycol (GLYCOLAX) 17 gram/dose powder, Take 17 g by mouth before dinner., Disp: 1530 g, Rfl: 1    promethazine-codeine 6.25-10 mg/5 ml (PHENERGAN WITH CODEINE) 6.25-10 mg/5 mL syrup, Take 5 mLs by mouth every 4 to 6 hours as needed for Cough., Disp: 240 mL, Rfl: 0    vitamin D 1000 units Tab, Take 185 mg by mouth once daily. D3, Disp: , Rfl:     azelastine (ASTELIN) 137 mcg (0.1 %) nasal spray, 2 sprays (274 mcg total) by Nasal route 2 (two) times daily., Disp: 30 mL, Rfl: 1    Allergies:  Dilaudid [hydromorphone (bulk)], Codeine, Dilaudid [hydromorphone], Percocet  [oxycodone-acetaminophen], and Sulfa (sulfonamide antibiotics)    Objective:     Vital signs:  Blood pressure (!) 142/66, pulse 62, height 5' 2" (1.575 m), weight 59.7 kg (131 lb 9.8 oz).    NEUROLOGICAL EXAMINATION:     MENTAL STATUS   Oriented to person, place, and time.   Attention: normal. Concentration: normal.   Speech: speech is normal   Level of consciousness: alert  Able to name object (Named 6/6 objects). Able to read (Read 6/6 words and 5/5 " sentences). Able to repeat. Able to write. Normal comprehension. Praxis: normal     CRANIAL NERVES     CN II   Visual fields full to confrontation.     CN III, IV, VI   Pupils are equal, round, and reactive to light.  Extraocular motions are normal.   Nystagmus: none   Diplopia: none  Ophthalmoparesis: none  Upgaze: normal  Downgaze: normal    CN V   Facial sensation intact.     CN VII   Right facial weakness: none  Left facial weakness: none    CN VIII   Hearing: intact    CN IX, X   Palate: symmetric    CN XI   Right sternocleidomastoid strength: normal  Left sternocleidomastoid strength: normal  Right trapezius strength: normal  Left trapezius strength: normal    CN XII   Tongue: not atrophic  Fasciculations: absent  Tongue deviation: none    MOTOR EXAM   Muscle bulk: normal  Right arm tone: cogwheel rigidity (mild)  Left arm tone: increased    Strength   Strength 5/5 throughout.        +Hypomimia     REFLEXES     Reflexes   Reflexes 2+ except as noted.   Right Smith: absent  Left Smith: absent    SENSORY EXAM   Light touch normal.     GAIT AND COORDINATION     Gait  Gait: (slow, short stride, decreased arm swing b/l)     Coordination   Finger to nose coordination: normal    Tremor   Resting tremor: absent    Kris Cognitive Assessment (MoCA v7.1):  Visuospatial/Executive Score Norms - Mean (SD)   Trail making test: 1 - correct 0.87 (0.34)   Cube copy: 0 - incorrect 0.71 (0.46)   Clock contour: 1 - correct  2.56 (0.65)   Clock numbers: 1 - correct     Clock hands: 1 - correct     Naming   Lion: 1 - correct  2.88 (0.36)   Rhino: 1 - correct     Camel: 1 - correct     Memory - First Trial (Not Scored)   Face: Yes     Velvet: Yes     Mosque: Yes     Taina: Yes     Red: No     Memory - Second Trial (Not Scored)   Face: Yes     Velvet: Yes     Mosque: Yes     Taina: Yes     Red: Yes     Attention   Digits forward (2 1 8 5 4): 1 - correct  1.82 (0.44)   Digits backward (7 4 2): 1 - correct     Letter A: 1 -  correct  0.97 (0.18)   Serial 7: 3 - 4 or 5 correct  2.89 (0.41)   Language   Sentence repetition (Timoteo): 0 - incorrect  1.83 (0.37)   Sentence repetition (Cat): 1 - correct     Lexical fluency (D words): 0 - incorrect  0.87 (0.34)   Abstraction   Train - Bicycle: 0 - incorrect  1.83 (0.43)   Watch - Ruler: 1 - correct     Delayed Recall   Face: 1 - correct  3.73 (1.27)   Velvet: 1 - correct     Evangelical: 1 - correct     Taina: 1 - correct     Red: 1 - correct     Orientation   Date: 1 - correct  5.99 (0.11)   Month: 1 - correct    Year: 1 - correct    Day: 1 - correct    Place: 1 - correct    City: 1 - correct    Other   ? 12 years of education: 0 - incorrect    Total Score* 26/30 27.37 (2.20)   *Ranges of severity: 18-25 = mild cognitive impairment, 10-17 = moderate cognitive impairment, <10 = severe cognitive impairment. The cut-off score of 18 is usually considered to separate MCI from AD but there is overlap in the scores since, by definition, AD is determined by the presence of cognitive impairment in addition to loss of autonomy. The average MoCA score for MCI is 22 (range 19-25) and the average MoCA score for Mild AD is 16 (11-21).    Neuroimaging:  Results for orders placed or performed during the hospital encounter of 01/01/22   CT Head Without Contrast    Narrative    EXAMINATION:  CT HEAD WITHOUT CONTRAST    CLINICAL HISTORY:  Head trauma, intracranial venous injury suspected;    TECHNIQUE:  Low dose axial images were obtained through the head.  Coronal and sagittal reformations were also performed. Contrast was not administered.    COMPARISON:  MRI brain 10/21/2021, head CT 01/17/2021    FINDINGS:  There is no acute intracranial hemorrhage, hydrocephalus, midline shift or mass effect. Gray-white matter differentiation appears maintained. The basal cisterns are patent. The visualized paranasal sinuses and left mastoid air cells are clear of acute process.  There is minimal opacification of the inferior  right mastoid air cells.  The visualized bones of the calvarium demonstrate no acute osseous abnormality.      Impression    No CT evidence of acute intracranial abnormality. Clinical correlation and further evaluation as warranted.      Electronically signed by: Martha Zuluaga MD  Date:    01/02/2022  Time:    01:32   Results for orders placed or performed during the hospital encounter of 10/21/21   MRI Brain Without Contrast    Narrative    EXAMINATION:  MRI BRAIN WITHOUT CONTRAST    CLINICAL HISTORY:  PDism;Tremor, unspecified    TECHNIQUE:  Multiplanar multisequence MR imaging of the brain was performed without intravenous contrast.    COMPARISON:  CT head 01/17/2020    MRI brain 03/24/2015, 10/26/2012, 04/23/2012    FINDINGS:  Intracranial Compartment:    Ventricles stable in size.  No evidence of hydrocephalus..    The brain parenchyma appears unchanged.  No new mass, hemorrhage, edema or major vascular distribution infarct.    No extra-axial blood or fluid collections.    Normal vascular flow voids are preserved.    Skull/Extracranial Contents (limited evaluation):    Bone marrow signal intensity is normal.      Impression    Stable exam.  No evidence of acute intracranial pathology.    Electronically signed by resident: Emiliano Willingham  Date:    10/21/2021  Time:    14:22    Electronically signed by: Primo Liao MD  Date:    10/21/2021  Time:    15:09     *Note: Due to a large number of results and/or encounters for the requested time period, some results have not been displayed. A complete set of results can be found in Results Review.     Laboratory studies:  Lab Visit on 01/10/2022   Component Date Value Ref Range Status    Sodium 01/10/2022 137  136 - 145 mmol/L Final    Potassium 01/10/2022 4.0  3.5 - 5.1 mmol/L Final    Chloride 01/10/2022 102  95 - 110 mmol/L Final    CO2 01/10/2022 27  23 - 29 mmol/L Final    Glucose 01/10/2022 175* 70 - 110 mg/dL Final    BUN 01/10/2022 10  8 - 23 mg/dL Final     Creatinine 01/10/2022 0.7  0.5 - 1.4 mg/dL Final    Calcium 01/10/2022 9.5  8.7 - 10.5 mg/dL Final    Total Protein 01/10/2022 7.0  6.0 - 8.4 g/dL Final    Albumin 01/10/2022 3.6  3.5 - 5.2 g/dL Final    Total Bilirubin 01/10/2022 1.3* 0.1 - 1.0 mg/dL Final    Comment: For infants and newborns, interpretation of results should be based  on gestational age, weight and in agreement with clinical  observations.    Premature Infant recommended reference ranges:  Up to 24 hours.............<8.0 mg/dL  Up to 48 hours............<12.0 mg/dL  3-5 days..................<15.0 mg/dL  6-29 days.................<15.0 mg/dL      Alkaline Phosphatase 01/10/2022 96  55 - 135 U/L Final    AST 01/10/2022 23  10 - 40 U/L Final    ALT 01/10/2022 28  10 - 44 U/L Final    Anion Gap 01/10/2022 8  8 - 16 mmol/L Final    eGFR if African American 01/10/2022 >60.0  >60 mL/min/1.73 m^2 Final    eGFR if non African American 01/10/2022 >60.0  >60 mL/min/1.73 m^2 Final    Comment: Calculation used to obtain the estimated glomerular filtration  rate (eGFR) is the CKD-EPI equation.       Lipase 01/10/2022 14  4 - 60 U/L Final    Iron 01/10/2022 108  30 - 160 ug/dL Final    Transferrin 01/10/2022 221  200 - 375 mg/dL Final    TIBC 01/10/2022 327  250 - 450 ug/dL Final    Saturated Iron 01/10/2022 33  20 - 50 % Final    Ferritin 01/10/2022 385* 20.0 - 300.0 ng/mL Final    IgA 01/10/2022 255  40 - 350 mg/dL Final    IgA Cord Blood Reference Range: <5 mg/dL.    Hemoglobin 01/10/2022 11.3* 12.0 - 16.0 g/dL Final   Admission on 01/01/2022, Discharged on 01/04/2022   Component Date Value Ref Range Status    WBC 01/01/2022 8.55  3.90 - 12.70 K/uL Final    RBC 01/01/2022 3.54* 4.00 - 5.40 M/uL Final    Hemoglobin 01/01/2022 11.5* 12.0 - 16.0 g/dL Final    Hematocrit 01/01/2022 36.1* 37.0 - 48.5 % Final    MCV 01/01/2022 102* 82 - 98 fL Final    MCH 01/01/2022 32.5* 27.0 - 31.0 pg Final    MCHC 01/01/2022 31.9* 32.0 - 36.0 g/dL Final     RDW 01/01/2022 14.2  11.5 - 14.5 % Final    Platelets 01/01/2022 84* 150 - 450 K/uL Final    MPV 01/01/2022 10.9  9.2 - 12.9 fL Final    Immature Granulocytes 01/01/2022 0.1  0.0 - 0.5 % Final    Gran # (ANC) 01/01/2022 6.4  1.8 - 7.7 K/uL Final    Immature Grans (Abs) 01/01/2022 0.01  0.00 - 0.04 K/uL Final    Comment: Mild elevation in immature granulocytes is non specific and   can be seen in a variety of conditions including stress response,   acute inflammation, trauma and pregnancy. Correlation with other   laboratory and clinical findings is essential.      Lymph # 01/01/2022 1.2  1.0 - 4.8 K/uL Final    Mono # 01/01/2022 0.7  0.3 - 1.0 K/uL Final    Eos # 01/01/2022 0.2  0.0 - 0.5 K/uL Final    Baso # 01/01/2022 0.03  0.00 - 0.20 K/uL Final    nRBC 01/01/2022 0  0 /100 WBC Final    Gran % 01/01/2022 74.4* 38.0 - 73.0 % Final    Lymph % 01/01/2022 14.5* 18.0 - 48.0 % Final    Mono % 01/01/2022 8.7  4.0 - 15.0 % Final    Eosinophil % 01/01/2022 1.9  0.0 - 8.0 % Final    Basophil % 01/01/2022 0.4  0.0 - 1.9 % Final    Differential Method 01/01/2022 Automated   Final    Sodium 01/01/2022 138  136 - 145 mmol/L Final    Potassium 01/01/2022 4.7  3.5 - 5.1 mmol/L Final    Chloride 01/01/2022 104  95 - 110 mmol/L Final    CO2 01/01/2022 23  23 - 29 mmol/L Final    Glucose 01/01/2022 179* 70 - 110 mg/dL Final    BUN 01/01/2022 12  8 - 23 mg/dL Final    Creatinine 01/01/2022 0.8  0.5 - 1.4 mg/dL Final    Calcium 01/01/2022 9.3  8.7 - 10.5 mg/dL Final    Total Protein 01/01/2022 7.2  6.0 - 8.4 g/dL Final    Albumin 01/01/2022 3.8  3.5 - 5.2 g/dL Final    Total Bilirubin 01/01/2022 1.4* 0.1 - 1.0 mg/dL Final    Comment: For infants and newborns, interpretation of results should be based  on gestational age, weight and in agreement with clinical  observations.    Premature Infant recommended reference ranges:  Up to 24 hours.............<8.0 mg/dL  Up to 48 hours............<12.0 mg/dL  3-5  days..................<15.0 mg/dL  6-29 days.................<15.0 mg/dL      Alkaline Phosphatase 01/01/2022 97  55 - 135 U/L Final    AST 01/01/2022 51* 10 - 40 U/L Final    ALT 01/01/2022 40  10 - 44 U/L Final    Anion Gap 01/01/2022 11  8 - 16 mmol/L Final    eGFR if African American 01/01/2022 >60.0  >60 mL/min/1.73 m^2 Final    eGFR if non African American 01/01/2022 >60.0  >60 mL/min/1.73 m^2 Final    Comment: Calculation used to obtain the estimated glomerular filtration  rate (eGFR) is the CKD-EPI equation.       Lipase 01/01/2022 13  4 - 60 U/L Final    Lactate (Lactic Acid) 01/01/2022 1.5  0.5 - 2.2 mmol/L Final    Comment: Falsely low lactic acid results can be found in samples   containing >=13.0 mg/dL total bilirubin and/or >=3.5 mg/dL   direct bilirubin.      POC Rapid COVID 01/01/2022 Negative  Negative Final     Acceptable 01/01/2022 Yes   Final    Specimen UA 01/01/2022 Urine, Clean Catch   Final    Color, UA 01/01/2022 Yellow  Yellow, Straw, [place] Final    Appearance, UA 01/01/2022 Clear  Clear Final    pH, UA 01/01/2022 6.0  5.0 - 8.0 Final    Specific Gravity, UA 01/01/2022 1.015  1.005 - 1.030 Final    Protein, UA 01/01/2022 Negative  Negative Final    Comment: Recommend a 24 hour urine protein or a urine   protein/creatinine ratio if globulin induced proteinuria is  clinically suspected.      Glucose, UA 01/01/2022 Negative  Negative Final    Ketones, UA 01/01/2022 Negative  Negative Final    Bilirubin (UA) 01/01/2022 Negative  Negative Final    Occult Blood UA 01/01/2022 Negative  Negative Final    Nitrite, UA 01/01/2022 Negative  Negative Final    Leukocytes, UA 01/01/2022 Trace* Negative Final    RBC, UA 01/01/2022 1  0 - 4 /hpf Final    WBC, UA 01/01/2022 8* 0 - 5 /hpf Final    Squam Epithel, UA 01/01/2022 1  /hpf Final    Microscopic Comment 01/01/2022 SEE COMMENT   Final    Comment: Other formed elements not mentioned in the report are not    present in the microscopic examination.       Sodium 01/02/2022 138  136 - 145 mmol/L Final    Potassium 01/02/2022 4.1  3.5 - 5.1 mmol/L Final    Chloride 01/02/2022 109  95 - 110 mmol/L Final    CO2 01/02/2022 23  23 - 29 mmol/L Final    Glucose 01/02/2022 154* 70 - 110 mg/dL Final    BUN 01/02/2022 9  8 - 23 mg/dL Final    Creatinine 01/02/2022 0.7  0.5 - 1.4 mg/dL Final    Calcium 01/02/2022 8.3* 8.7 - 10.5 mg/dL Final    Total Protein 01/02/2022 5.5* 6.0 - 8.4 g/dL Final    Albumin 01/02/2022 3.0* 3.5 - 5.2 g/dL Final    Total Bilirubin 01/02/2022 1.0  0.1 - 1.0 mg/dL Final    Comment: For infants and newborns, interpretation of results should be based  on gestational age, weight and in agreement with clinical  observations.    Premature Infant recommended reference ranges:  Up to 24 hours.............<8.0 mg/dL  Up to 48 hours............<12.0 mg/dL  3-5 days..................<15.0 mg/dL  6-29 days.................<15.0 mg/dL      Alkaline Phosphatase 01/02/2022 82  55 - 135 U/L Final    AST 01/02/2022 50* 10 - 40 U/L Final    ALT 01/02/2022 40  10 - 44 U/L Final    Anion Gap 01/02/2022 6* 8 - 16 mmol/L Final    eGFR if African American 01/02/2022 >60.0  >60 mL/min/1.73 m^2 Final    eGFR if non African American 01/02/2022 >60.0  >60 mL/min/1.73 m^2 Final    Comment: Calculation used to obtain the estimated glomerular filtration  rate (eGFR) is the CKD-EPI equation.       Magnesium 01/02/2022 1.7  1.6 - 2.6 mg/dL Final    Phosphorus 01/02/2022 3.1  2.7 - 4.5 mg/dL Final    WBC 01/02/2022 4.45  3.90 - 12.70 K/uL Final    RBC 01/02/2022 3.10* 4.00 - 5.40 M/uL Final    Hemoglobin 01/02/2022 10.1* 12.0 - 16.0 g/dL Final    Hematocrit 01/02/2022 31.0* 37.0 - 48.5 % Final    MCV 01/02/2022 100* 82 - 98 fL Final    MCH 01/02/2022 32.6* 27.0 - 31.0 pg Final    MCHC 01/02/2022 32.6  32.0 - 36.0 g/dL Final    RDW 01/02/2022 13.9  11.5 - 14.5 % Final    Platelets 01/02/2022 63* 150 - 450 K/uL  Final    MPV 01/02/2022 10.7  9.2 - 12.9 fL Final    Immature Granulocytes 01/02/2022 0.0  0.0 - 0.5 % Final    Gran # (ANC) 01/02/2022 2.5  1.8 - 7.7 K/uL Final    Immature Grans (Abs) 01/02/2022 0.00  0.00 - 0.04 K/uL Final    Comment: Mild elevation in immature granulocytes is non specific and   can be seen in a variety of conditions including stress response,   acute inflammation, trauma and pregnancy. Correlation with other   laboratory and clinical findings is essential.      Lymph # 01/02/2022 1.3  1.0 - 4.8 K/uL Final    Mono # 01/02/2022 0.5  0.3 - 1.0 K/uL Final    Eos # 01/02/2022 0.2  0.0 - 0.5 K/uL Final    Baso # 01/02/2022 0.02  0.00 - 0.20 K/uL Final    nRBC 01/02/2022 0  0 /100 WBC Final    Gran % 01/02/2022 55.8  38.0 - 73.0 % Final    Lymph % 01/02/2022 29.2  18.0 - 48.0 % Final    Mono % 01/02/2022 10.6  4.0 - 15.0 % Final    Eosinophil % 01/02/2022 4.0  0.0 - 8.0 % Final    Basophil % 01/02/2022 0.4  0.0 - 1.9 % Final    Differential Method 01/02/2022 Automated   Final    Sodium 01/03/2022 140  136 - 145 mmol/L Final    Potassium 01/03/2022 4.1  3.5 - 5.1 mmol/L Final    Chloride 01/03/2022 107  95 - 110 mmol/L Final    CO2 01/03/2022 26  23 - 29 mmol/L Final    Glucose 01/03/2022 113* 70 - 110 mg/dL Final    BUN 01/03/2022 8  8 - 23 mg/dL Final    Creatinine 01/03/2022 0.7  0.5 - 1.4 mg/dL Final    Calcium 01/03/2022 8.7  8.7 - 10.5 mg/dL Final    Total Protein 01/03/2022 5.1* 6.0 - 8.4 g/dL Final    Albumin 01/03/2022 2.9* 3.5 - 5.2 g/dL Final    Total Bilirubin 01/03/2022 1.4* 0.1 - 1.0 mg/dL Final    Comment: For infants and newborns, interpretation of results should be based  on gestational age, weight and in agreement with clinical  observations.    Premature Infant recommended reference ranges:  Up to 24 hours.............<8.0 mg/dL  Up to 48 hours............<12.0 mg/dL  3-5 days..................<15.0 mg/dL  6-29 days.................<15.0 mg/dL      Alkaline  Phosphatase 01/03/2022 80  55 - 135 U/L Final    AST 01/03/2022 31  10 - 40 U/L Final    ALT 01/03/2022 33  10 - 44 U/L Final    Anion Gap 01/03/2022 7* 8 - 16 mmol/L Final    eGFR if African American 01/03/2022 >60.0  >60 mL/min/1.73 m^2 Final    eGFR if non African American 01/03/2022 >60.0  >60 mL/min/1.73 m^2 Final    Comment: Calculation used to obtain the estimated glomerular filtration  rate (eGFR) is the CKD-EPI equation.       Magnesium 01/03/2022 1.6  1.6 - 2.6 mg/dL Final    Phosphorus 01/03/2022 3.9  2.7 - 4.5 mg/dL Final    WBC 01/03/2022 3.45* 3.90 - 12.70 K/uL Final    RBC 01/03/2022 3.18* 4.00 - 5.40 M/uL Final    Hemoglobin 01/03/2022 10.5* 12.0 - 16.0 g/dL Final    Hematocrit 01/03/2022 32.5* 37.0 - 48.5 % Final    MCV 01/03/2022 102* 82 - 98 fL Final    MCH 01/03/2022 33.0* 27.0 - 31.0 pg Final    MCHC 01/03/2022 32.3  32.0 - 36.0 g/dL Final    RDW 01/03/2022 14.0  11.5 - 14.5 % Final    Platelets 01/03/2022 62* 150 - 450 K/uL Final    MPV 01/03/2022 10.5  9.2 - 12.9 fL Final    Immature Granulocytes 01/03/2022 0.0  0.0 - 0.5 % Final    Gran # (ANC) 01/03/2022 1.6* 1.8 - 7.7 K/uL Final    Immature Grans (Abs) 01/03/2022 0.00  0.00 - 0.04 K/uL Final    Comment: Mild elevation in immature granulocytes is non specific and   can be seen in a variety of conditions including stress response,   acute inflammation, trauma and pregnancy. Correlation with other   laboratory and clinical findings is essential.      Lymph # 01/03/2022 1.2  1.0 - 4.8 K/uL Final    Mono # 01/03/2022 0.5  0.3 - 1.0 K/uL Final    Eos # 01/03/2022 0.2  0.0 - 0.5 K/uL Final    Baso # 01/03/2022 0.02  0.00 - 0.20 K/uL Final    nRBC 01/03/2022 0  0 /100 WBC Final    Gran % 01/03/2022 47.6  38.0 - 73.0 % Final    Lymph % 01/03/2022 33.6  18.0 - 48.0 % Final    Mono % 01/03/2022 13.0  4.0 - 15.0 % Final    Eosinophil % 01/03/2022 5.2  0.0 - 8.0 % Final    Basophil % 01/03/2022 0.6  0.0 - 1.9 % Final     Differential Method 01/03/2022 Automated   Final    Sodium 01/04/2022 135* 136 - 145 mmol/L Final    Potassium 01/04/2022 3.7  3.5 - 5.1 mmol/L Final    Chloride 01/04/2022 103  95 - 110 mmol/L Final    CO2 01/04/2022 24  23 - 29 mmol/L Final    Glucose 01/04/2022 127* 70 - 110 mg/dL Final    BUN 01/04/2022 8  8 - 23 mg/dL Final    Creatinine 01/04/2022 0.7  0.5 - 1.4 mg/dL Final    Calcium 01/04/2022 8.8  8.7 - 10.5 mg/dL Final    Total Protein 01/04/2022 5.3* 6.0 - 8.4 g/dL Final    Albumin 01/04/2022 3.0* 3.5 - 5.2 g/dL Final    Total Bilirubin 01/04/2022 1.4* 0.1 - 1.0 mg/dL Final    Comment: For infants and newborns, interpretation of results should be based  on gestational age, weight and in agreement with clinical  observations.    Premature Infant recommended reference ranges:  Up to 24 hours.............<8.0 mg/dL  Up to 48 hours............<12.0 mg/dL  3-5 days..................<15.0 mg/dL  6-29 days.................<15.0 mg/dL      Alkaline Phosphatase 01/04/2022 111  55 - 135 U/L Final    AST 01/04/2022 36  10 - 40 U/L Final    ALT 01/04/2022 33  10 - 44 U/L Final    Anion Gap 01/04/2022 8  8 - 16 mmol/L Final    eGFR if African American 01/04/2022 >60.0  >60 mL/min/1.73 m^2 Final    eGFR if non African American 01/04/2022 >60.0  >60 mL/min/1.73 m^2 Final    Comment: Calculation used to obtain the estimated glomerular filtration  rate (eGFR) is the CKD-EPI equation.       Magnesium 01/04/2022 1.3* 1.6 - 2.6 mg/dL Final    Phosphorus 01/04/2022 3.9  2.7 - 4.5 mg/dL Final    WBC 01/04/2022 4.00  3.90 - 12.70 K/uL Final    RBC 01/04/2022 2.94* 4.00 - 5.40 M/uL Final    Hemoglobin 01/04/2022 9.6* 12.0 - 16.0 g/dL Final    Hematocrit 01/04/2022 29.1* 37.0 - 48.5 % Final    MCV 01/04/2022 99* 82 - 98 fL Final    MCH 01/04/2022 32.7* 27.0 - 31.0 pg Final    MCHC 01/04/2022 33.0  32.0 - 36.0 g/dL Final    RDW 01/04/2022 13.7  11.5 - 14.5 % Final    Platelets 01/04/2022 65* 150 - 450  K/uL Final    MPV 01/04/2022 11.0  9.2 - 12.9 fL Final    Immature Granulocytes 01/04/2022 0.0  0.0 - 0.5 % Final    Gran # (ANC) 01/04/2022 2.3  1.8 - 7.7 K/uL Final    Immature Grans (Abs) 01/04/2022 0.00  0.00 - 0.04 K/uL Final    Comment: Mild elevation in immature granulocytes is non specific and   can be seen in a variety of conditions including stress response,   acute inflammation, trauma and pregnancy. Correlation with other   laboratory and clinical findings is essential.      Lymph # 01/04/2022 1.1  1.0 - 4.8 K/uL Final    Mono # 01/04/2022 0.4  0.3 - 1.0 K/uL Final    Eos # 01/04/2022 0.2  0.0 - 0.5 K/uL Final    Baso # 01/04/2022 0.01  0.00 - 0.20 K/uL Final    nRBC 01/04/2022 0  0 /100 WBC Final    Gran % 01/04/2022 57.1  38.0 - 73.0 % Final    Lymph % 01/04/2022 27.5  18.0 - 48.0 % Final    Mono % 01/04/2022 10.3  4.0 - 15.0 % Final    Eosinophil % 01/04/2022 4.8  0.0 - 8.0 % Final    Basophil % 01/04/2022 0.3  0.0 - 1.9 % Final    Differential Method 01/04/2022 Automated   Final     I performed a total of 88 minutes on Ms. Judd's care on the day of their visit excluding time spent related to any billed procedures. This time includes time spent with the patient as well as time spent documenting in the medical record, reviewing patient's records and tests, obtaining history, placing orders, communicating with other healthcare professionals, counseling the patient, family, or caregiver, and/or care coordination for the diagnoses above.    This note was dictated using [employer] speech recognition software. Please excuse any spelling or grammatical errors. Word recognition mistakes are occasionally missed on review.

## 2022-01-11 ENCOUNTER — PES CALL (OUTPATIENT)
Dept: HOME HEALTH SERVICES | Facility: CLINIC | Age: 77
End: 2022-01-11
Payer: MEDICARE

## 2022-01-11 ENCOUNTER — HOSPITAL ENCOUNTER (OUTPATIENT)
Dept: RADIOLOGY | Facility: OTHER | Age: 77
Discharge: HOME | DRG: 392 | End: 2022-01-11
Attending: INTERNAL MEDICINE
Payer: MEDICARE

## 2022-01-11 DIAGNOSIS — R60.9 EDEMA, UNSPECIFIED TYPE: ICD-10-CM

## 2022-01-11 PROCEDURE — 93971 EXTREMITY STUDY: CPT | Mod: 26,RT,, | Performed by: RADIOLOGY

## 2022-01-11 PROCEDURE — 93971 EXTREMITY STUDY: CPT | Mod: TC,RT

## 2022-01-11 PROCEDURE — 93971 US LOWER EXTREMITY VEINS RIGHT: ICD-10-PCS | Mod: 26,RT,, | Performed by: RADIOLOGY

## 2022-01-12 ENCOUNTER — HOSPITAL ENCOUNTER (INPATIENT)
Facility: HOSPITAL | Age: 77
LOS: 2 days | Discharge: HOME | DRG: 392 | End: 2022-01-15
Attending: EMERGENCY MEDICINE | Admitting: EMERGENCY MEDICINE
Payer: MEDICARE

## 2022-01-12 ENCOUNTER — PES CALL (OUTPATIENT)
Dept: HOME HEALTH SERVICES | Facility: CLINIC | Age: 77
End: 2022-01-12
Payer: MEDICARE

## 2022-01-12 DIAGNOSIS — K56.600 PARTIAL SMALL BOWEL OBSTRUCTION: Primary | ICD-10-CM

## 2022-01-12 DIAGNOSIS — R10.9 ABDOMINAL PAIN: ICD-10-CM

## 2022-01-12 DIAGNOSIS — R07.9 CHEST PAIN: ICD-10-CM

## 2022-01-12 DIAGNOSIS — N30.01 ACUTE CYSTITIS WITH HEMATURIA: ICD-10-CM

## 2022-01-12 PROBLEM — R10.84 GENERALIZED ABDOMINAL PAIN: Status: ACTIVE | Noted: 2018-04-17

## 2022-01-12 LAB
ALBUMIN SERPL BCP-MCNC: 3.7 G/DL (ref 3.5–5.2)
ALP SERPL-CCNC: 89 U/L (ref 55–135)
ALT SERPL W/O P-5'-P-CCNC: 24 U/L (ref 10–44)
ANION GAP SERPL CALC-SCNC: 6 MMOL/L (ref 8–16)
AST SERPL-CCNC: 26 U/L (ref 10–40)
BACTERIA #/AREA URNS AUTO: ABNORMAL /HPF
BASOPHILS # BLD AUTO: 0.03 K/UL (ref 0–0.2)
BASOPHILS NFR BLD: 0.5 % (ref 0–1.9)
BILIRUB SERPL-MCNC: 1.6 MG/DL (ref 0.1–1)
BILIRUB UR QL STRIP: NEGATIVE
BUN SERPL-MCNC: 13 MG/DL (ref 6–30)
BUN SERPL-MCNC: 13 MG/DL (ref 8–23)
CALCIUM SERPL-MCNC: 9.7 MG/DL (ref 8.7–10.5)
CHLORIDE SERPL-SCNC: 101 MMOL/L (ref 95–110)
CHLORIDE SERPL-SCNC: 101 MMOL/L (ref 95–110)
CLARITY UR REFRACT.AUTO: ABNORMAL
CO2 SERPL-SCNC: 27 MMOL/L (ref 23–29)
COLOR UR AUTO: YELLOW
CREAT SERPL-MCNC: 0.8 MG/DL (ref 0.5–1.4)
CREAT SERPL-MCNC: 0.8 MG/DL (ref 0.5–1.4)
CTP QC/QA: YES
DIFFERENTIAL METHOD: ABNORMAL
EOSINOPHIL # BLD AUTO: 0.2 K/UL (ref 0–0.5)
EOSINOPHIL NFR BLD: 3.7 % (ref 0–8)
ERYTHROCYTE [DISTWIDTH] IN BLOOD BY AUTOMATED COUNT: 14.1 % (ref 11.5–14.5)
EST. GFR  (AFRICAN AMERICAN): >60 ML/MIN/1.73 M^2
EST. GFR  (NON AFRICAN AMERICAN): >60 ML/MIN/1.73 M^2
GLUCOSE SERPL-MCNC: 146 MG/DL (ref 70–110)
GLUCOSE SERPL-MCNC: 148 MG/DL (ref 70–110)
GLUCOSE UR QL STRIP: NEGATIVE
HCT VFR BLD AUTO: 37.2 % (ref 37–48.5)
HCT VFR BLD CALC: 35 %PCV (ref 36–54)
HGB BLD-MCNC: 12 G/DL (ref 12–16)
HGB UR QL STRIP: NEGATIVE
IMM GRANULOCYTES # BLD AUTO: 0.02 K/UL (ref 0–0.04)
IMM GRANULOCYTES NFR BLD AUTO: 0.3 % (ref 0–0.5)
KETONES UR QL STRIP: NEGATIVE
LACTATE SERPL-SCNC: 1.2 MMOL/L (ref 0.5–2.2)
LEUKOCYTE ESTERASE UR QL STRIP: ABNORMAL
LIPASE SERPL-CCNC: 14 U/L (ref 4–60)
LYMPHOCYTES # BLD AUTO: 1.3 K/UL (ref 1–4.8)
LYMPHOCYTES NFR BLD: 20 % (ref 18–48)
MCH RBC QN AUTO: 32.4 PG (ref 27–31)
MCHC RBC AUTO-ENTMCNC: 32.3 G/DL (ref 32–36)
MCV RBC AUTO: 101 FL (ref 82–98)
MICROSCOPIC COMMENT: ABNORMAL
MONOCYTES # BLD AUTO: 0.6 K/UL (ref 0.3–1)
MONOCYTES NFR BLD: 9 % (ref 4–15)
NEUTROPHILS # BLD AUTO: 4.2 K/UL (ref 1.8–7.7)
NEUTROPHILS NFR BLD: 66.5 % (ref 38–73)
NITRITE UR QL STRIP: NEGATIVE
NRBC BLD-RTO: 0 /100 WBC
PH UR STRIP: 8 [PH] (ref 5–8)
PLATELET # BLD AUTO: 104 K/UL (ref 150–450)
PMV BLD AUTO: 11.1 FL (ref 9.2–12.9)
POC IONIZED CALCIUM: 1.27 MMOL/L (ref 1.06–1.42)
POC TCO2 (MEASURED): 27 MMOL/L (ref 23–29)
POCT GLUCOSE: 114 MG/DL (ref 70–110)
POTASSIUM BLD-SCNC: 4.1 MMOL/L (ref 3.5–5.1)
POTASSIUM SERPL-SCNC: 4.1 MMOL/L (ref 3.5–5.1)
PROT SERPL-MCNC: 7.2 G/DL (ref 6–8.4)
PROT UR QL STRIP: NEGATIVE
RBC # BLD AUTO: 3.7 M/UL (ref 4–5.4)
RBC #/AREA URNS AUTO: 6 /HPF (ref 0–4)
SAMPLE: ABNORMAL
SARS-COV-2 RDRP RESP QL NAA+PROBE: NEGATIVE
SODIUM BLD-SCNC: 138 MMOL/L (ref 136–145)
SODIUM SERPL-SCNC: 134 MMOL/L (ref 136–145)
SP GR UR STRIP: 1.01 (ref 1–1.03)
SQUAMOUS #/AREA URNS AUTO: 0 /HPF
URN SPEC COLLECT METH UR: ABNORMAL
WBC # BLD AUTO: 6.24 K/UL (ref 3.9–12.7)
WBC #/AREA URNS AUTO: >100 /HPF (ref 0–5)

## 2022-01-12 PROCEDURE — 83605 ASSAY OF LACTIC ACID: CPT | Performed by: EMERGENCY MEDICINE

## 2022-01-12 PROCEDURE — 80053 COMPREHEN METABOLIC PANEL: CPT | Performed by: EMERGENCY MEDICINE

## 2022-01-12 PROCEDURE — 85025 COMPLETE CBC W/AUTO DIFF WBC: CPT | Performed by: EMERGENCY MEDICINE

## 2022-01-12 PROCEDURE — 96375 TX/PRO/DX INJ NEW DRUG ADDON: CPT

## 2022-01-12 PROCEDURE — 99285 EMERGENCY DEPT VISIT HI MDM: CPT | Mod: 25

## 2022-01-12 PROCEDURE — 99285 PR EMERGENCY DEPT VISIT,LEVEL V: ICD-10-PCS | Mod: CS,,, | Performed by: EMERGENCY MEDICINE

## 2022-01-12 PROCEDURE — 99285 EMERGENCY DEPT VISIT HI MDM: CPT | Mod: CS,,, | Performed by: EMERGENCY MEDICINE

## 2022-01-12 PROCEDURE — 83690 ASSAY OF LIPASE: CPT | Performed by: EMERGENCY MEDICINE

## 2022-01-12 PROCEDURE — 93005 ELECTROCARDIOGRAM TRACING: CPT

## 2022-01-12 PROCEDURE — 87088 URINE BACTERIA CULTURE: CPT | Performed by: EMERGENCY MEDICINE

## 2022-01-12 PROCEDURE — G0378 HOSPITAL OBSERVATION PER HR: HCPCS

## 2022-01-12 PROCEDURE — 25500020 PHARM REV CODE 255: Performed by: EMERGENCY MEDICINE

## 2022-01-12 PROCEDURE — 63600175 PHARM REV CODE 636 W HCPCS: Performed by: NURSE PRACTITIONER

## 2022-01-12 PROCEDURE — 81001 URINALYSIS AUTO W/SCOPE: CPT | Performed by: EMERGENCY MEDICINE

## 2022-01-12 PROCEDURE — 25000003 PHARM REV CODE 250: Performed by: EMERGENCY MEDICINE

## 2022-01-12 PROCEDURE — 25000003 PHARM REV CODE 250: Performed by: NURSE PRACTITIONER

## 2022-01-12 PROCEDURE — 99220 PR INITIAL OBSERVATION CARE,LEVL III: ICD-10-PCS | Mod: ,,, | Performed by: NURSE PRACTITIONER

## 2022-01-12 PROCEDURE — 99220 PR INITIAL OBSERVATION CARE,LEVL III: CPT | Mod: ,,, | Performed by: NURSE PRACTITIONER

## 2022-01-12 PROCEDURE — 93010 EKG 12-LEAD: ICD-10-PCS | Mod: ,,, | Performed by: INTERNAL MEDICINE

## 2022-01-12 PROCEDURE — U0002 COVID-19 LAB TEST NON-CDC: HCPCS | Performed by: EMERGENCY MEDICINE

## 2022-01-12 PROCEDURE — 93010 ELECTROCARDIOGRAM REPORT: CPT | Mod: ,,, | Performed by: INTERNAL MEDICINE

## 2022-01-12 PROCEDURE — 96365 THER/PROPH/DIAG IV INF INIT: CPT

## 2022-01-12 PROCEDURE — 63600175 PHARM REV CODE 636 W HCPCS: Performed by: EMERGENCY MEDICINE

## 2022-01-12 PROCEDURE — 87186 SC STD MICRODIL/AGAR DIL: CPT | Performed by: EMERGENCY MEDICINE

## 2022-01-12 PROCEDURE — 87086 URINE CULTURE/COLONY COUNT: CPT | Performed by: EMERGENCY MEDICINE

## 2022-01-12 PROCEDURE — 96376 TX/PRO/DX INJ SAME DRUG ADON: CPT

## 2022-01-12 PROCEDURE — 87077 CULTURE AEROBIC IDENTIFY: CPT | Performed by: EMERGENCY MEDICINE

## 2022-01-12 RX ORDER — ACETAMINOPHEN 500 MG
1000 TABLET ORAL EVERY 8 HOURS
Status: DISCONTINUED | OUTPATIENT
Start: 2022-01-12 | End: 2022-01-15 | Stop reason: HOSPADM

## 2022-01-12 RX ORDER — POLYETHYLENE GLYCOL 3350 17 G/17G
17 POWDER, FOR SOLUTION ORAL DAILY
Status: DISCONTINUED | OUTPATIENT
Start: 2022-01-13 | End: 2022-01-12

## 2022-01-12 RX ORDER — PANTOPRAZOLE SODIUM 20 MG/1
20 TABLET, DELAYED RELEASE ORAL DAILY
Status: DISCONTINUED | OUTPATIENT
Start: 2022-01-13 | End: 2022-01-15 | Stop reason: HOSPADM

## 2022-01-12 RX ORDER — OXYCODONE HYDROCHLORIDE 5 MG/1
5 TABLET ORAL EVERY 6 HOURS PRN
Status: DISCONTINUED | OUTPATIENT
Start: 2022-01-12 | End: 2022-01-15 | Stop reason: HOSPADM

## 2022-01-12 RX ORDER — ENOXAPARIN SODIUM 100 MG/ML
40 INJECTION SUBCUTANEOUS EVERY 24 HOURS
Status: DISCONTINUED | OUTPATIENT
Start: 2022-01-13 | End: 2022-01-15 | Stop reason: HOSPADM

## 2022-01-12 RX ORDER — AMOXICILLIN 250 MG
1 CAPSULE ORAL 2 TIMES DAILY
Status: DISCONTINUED | OUTPATIENT
Start: 2022-01-13 | End: 2022-01-15 | Stop reason: HOSPADM

## 2022-01-12 RX ORDER — LEVOTHYROXINE SODIUM 75 UG/1
75 TABLET ORAL
Status: DISCONTINUED | OUTPATIENT
Start: 2022-01-13 | End: 2022-01-15 | Stop reason: HOSPADM

## 2022-01-12 RX ORDER — MORPHINE SULFATE 2 MG/ML
2 INJECTION, SOLUTION INTRAMUSCULAR; INTRAVENOUS EVERY 4 HOURS PRN
Status: DISCONTINUED | OUTPATIENT
Start: 2022-01-12 | End: 2022-01-12

## 2022-01-12 RX ORDER — IBUPROFEN 200 MG
24 TABLET ORAL
Status: DISCONTINUED | OUTPATIENT
Start: 2022-01-12 | End: 2022-01-15 | Stop reason: HOSPADM

## 2022-01-12 RX ORDER — DOCUSATE SODIUM 100 MG/1
100 CAPSULE, LIQUID FILLED ORAL 2 TIMES DAILY
Status: DISCONTINUED | OUTPATIENT
Start: 2022-01-13 | End: 2022-01-12

## 2022-01-12 RX ORDER — GLUCAGON 1 MG
1 KIT INJECTION
Status: DISCONTINUED | OUTPATIENT
Start: 2022-01-12 | End: 2022-01-15 | Stop reason: HOSPADM

## 2022-01-12 RX ORDER — POLYETHYLENE GLYCOL 3350 17 G/17G
17 POWDER, FOR SOLUTION ORAL DAILY
Status: DISCONTINUED | OUTPATIENT
Start: 2022-01-13 | End: 2022-01-14

## 2022-01-12 RX ORDER — ONDANSETRON 2 MG/ML
4 INJECTION INTRAMUSCULAR; INTRAVENOUS EVERY 8 HOURS PRN
Status: DISCONTINUED | OUTPATIENT
Start: 2022-01-12 | End: 2022-01-15 | Stop reason: HOSPADM

## 2022-01-12 RX ORDER — SODIUM CHLORIDE 9 MG/ML
INJECTION, SOLUTION INTRAVENOUS CONTINUOUS
Status: ACTIVE | OUTPATIENT
Start: 2022-01-12 | End: 2022-01-13

## 2022-01-12 RX ORDER — MORPHINE SULFATE 2 MG/ML
2 INJECTION, SOLUTION INTRAMUSCULAR; INTRAVENOUS EVERY 6 HOURS PRN
Status: DISCONTINUED | OUTPATIENT
Start: 2022-01-12 | End: 2022-01-15 | Stop reason: HOSPADM

## 2022-01-12 RX ORDER — MORPHINE SULFATE 2 MG/ML
2 INJECTION, SOLUTION INTRAMUSCULAR; INTRAVENOUS
Status: COMPLETED | OUTPATIENT
Start: 2022-01-12 | End: 2022-01-12

## 2022-01-12 RX ORDER — AMOXICILLIN 250 MG
1 CAPSULE ORAL 2 TIMES DAILY
Status: DISCONTINUED | OUTPATIENT
Start: 2022-01-12 | End: 2022-01-12

## 2022-01-12 RX ORDER — TALC
6 POWDER (GRAM) TOPICAL NIGHTLY PRN
Status: DISCONTINUED | OUTPATIENT
Start: 2022-01-12 | End: 2022-01-15 | Stop reason: HOSPADM

## 2022-01-12 RX ORDER — POLYETHYLENE GLYCOL 3350 17 G/17G
17 POWDER, FOR SOLUTION ORAL ONCE
Status: COMPLETED | OUTPATIENT
Start: 2022-01-12 | End: 2022-01-12

## 2022-01-12 RX ORDER — AZELASTINE 1 MG/ML
2 SPRAY, METERED NASAL 2 TIMES DAILY
Status: DISCONTINUED | OUTPATIENT
Start: 2022-01-12 | End: 2022-01-15 | Stop reason: HOSPADM

## 2022-01-12 RX ORDER — SODIUM CHLORIDE 0.9 % (FLUSH) 0.9 %
10 SYRINGE (ML) INJECTION EVERY 12 HOURS PRN
Status: DISCONTINUED | OUTPATIENT
Start: 2022-01-12 | End: 2022-01-15 | Stop reason: HOSPADM

## 2022-01-12 RX ORDER — ATORVASTATIN CALCIUM 40 MG/1
40 TABLET, FILM COATED ORAL DAILY
Status: DISCONTINUED | OUTPATIENT
Start: 2022-01-13 | End: 2022-01-15 | Stop reason: HOSPADM

## 2022-01-12 RX ORDER — DIPHENHYDRAMINE HCL 25 MG
25 CAPSULE ORAL EVERY 6 HOURS PRN
Status: DISCONTINUED | OUTPATIENT
Start: 2022-01-12 | End: 2022-01-15 | Stop reason: HOSPADM

## 2022-01-12 RX ORDER — NALOXONE HCL 0.4 MG/ML
0.02 VIAL (ML) INJECTION
Status: DISCONTINUED | OUTPATIENT
Start: 2022-01-12 | End: 2022-01-15 | Stop reason: HOSPADM

## 2022-01-12 RX ORDER — DULOXETIN HYDROCHLORIDE 30 MG/1
30 CAPSULE, DELAYED RELEASE ORAL 2 TIMES DAILY
Status: DISCONTINUED | OUTPATIENT
Start: 2022-01-12 | End: 2022-01-15 | Stop reason: HOSPADM

## 2022-01-12 RX ORDER — IBUPROFEN 200 MG
16 TABLET ORAL
Status: DISCONTINUED | OUTPATIENT
Start: 2022-01-12 | End: 2022-01-15 | Stop reason: HOSPADM

## 2022-01-12 RX ORDER — ONDANSETRON 2 MG/ML
4 INJECTION INTRAMUSCULAR; INTRAVENOUS
Status: COMPLETED | OUTPATIENT
Start: 2022-01-12 | End: 2022-01-12

## 2022-01-12 RX ORDER — MONTELUKAST SODIUM 10 MG/1
10 TABLET ORAL DAILY
Status: DISCONTINUED | OUTPATIENT
Start: 2022-01-13 | End: 2022-01-15 | Stop reason: HOSPADM

## 2022-01-12 RX ORDER — DICYCLOMINE HYDROCHLORIDE 10 MG/1
10 CAPSULE ORAL
Status: COMPLETED | OUTPATIENT
Start: 2022-01-12 | End: 2022-01-12

## 2022-01-12 RX ORDER — BUTALBITAL, ACETAMINOPHEN AND CAFFEINE 50; 325; 40 MG/1; MG/1; MG/1
1 TABLET ORAL EVERY 6 HOURS PRN
Status: DISCONTINUED | OUTPATIENT
Start: 2022-01-12 | End: 2022-01-15 | Stop reason: HOSPADM

## 2022-01-12 RX ORDER — INSULIN ASPART 100 [IU]/ML
0-5 INJECTION, SOLUTION INTRAVENOUS; SUBCUTANEOUS
Status: DISCONTINUED | OUTPATIENT
Start: 2022-01-12 | End: 2022-01-15 | Stop reason: HOSPADM

## 2022-01-12 RX ORDER — CIPROFLOXACIN 2 MG/ML
400 INJECTION, SOLUTION INTRAVENOUS
Status: DISCONTINUED | OUTPATIENT
Start: 2022-01-13 | End: 2022-01-15

## 2022-01-12 RX ORDER — OXYBUTYNIN CHLORIDE 10 MG/1
10 TABLET, EXTENDED RELEASE ORAL DAILY
Refills: 3 | Status: DISCONTINUED | OUTPATIENT
Start: 2022-01-13 | End: 2022-01-15 | Stop reason: HOSPADM

## 2022-01-12 RX ORDER — POLYETHYLENE GLYCOL 3350 17 G/17G
17 POWDER, FOR SOLUTION ORAL 2 TIMES DAILY
Status: DISCONTINUED | OUTPATIENT
Start: 2022-01-13 | End: 2022-01-12

## 2022-01-12 RX ADMIN — MORPHINE SULFATE 2 MG: 2 INJECTION, SOLUTION INTRAMUSCULAR; INTRAVENOUS at 02:01

## 2022-01-12 RX ADMIN — MORPHINE SULFATE 2 MG: 2 INJECTION, SOLUTION INTRAMUSCULAR; INTRAVENOUS at 11:01

## 2022-01-12 RX ADMIN — POLYETHYLENE GLYCOL 3350 17 G: 17 POWDER, FOR SOLUTION ORAL at 08:01

## 2022-01-12 RX ADMIN — SODIUM CHLORIDE: 0.9 INJECTION, SOLUTION INTRAVENOUS at 05:01

## 2022-01-12 RX ADMIN — ONDANSETRON 4 MG: 2 INJECTION, SOLUTION INTRAMUSCULAR; INTRAVENOUS at 12:01

## 2022-01-12 RX ADMIN — DICYCLOMINE HYDROCHLORIDE 10 MG: 10 CAPSULE ORAL at 01:01

## 2022-01-12 RX ADMIN — CEFTRIAXONE 1 G: 1 INJECTION, POWDER, FOR SOLUTION INTRAMUSCULAR; INTRAVENOUS at 03:01

## 2022-01-12 RX ADMIN — ACETAMINOPHEN 1000 MG: 500 TABLET ORAL at 09:01

## 2022-01-12 RX ADMIN — AZELASTINE HYDROCHLORIDE 274 MCG: 137 SPRAY, METERED NASAL at 09:01

## 2022-01-12 RX ADMIN — CIPROFLOXACIN 400 MG: 2 INJECTION, SOLUTION INTRAVENOUS at 11:01

## 2022-01-12 RX ADMIN — MORPHINE SULFATE 2 MG: 2 INJECTION, SOLUTION INTRAMUSCULAR; INTRAVENOUS at 05:01

## 2022-01-12 RX ADMIN — IOHEXOL 75 ML: 350 INJECTION, SOLUTION INTRAVENOUS at 03:01

## 2022-01-12 RX ADMIN — DULOXETINE 30 MG: 30 CAPSULE, DELAYED RELEASE ORAL at 08:01

## 2022-01-12 NOTE — CONSULTS
[facility] Dept  General Surgery  Consult Note    Patient Name: Anayeli Judd  MRN: [id number]  Code Status: Prior  Admission Date: 1/12/2022  Hospital Length of Stay: 0 days  Attending Physician: Jonathan Cummings DO  Primary Care Provider: Rocio Mc MD    Patient information was obtained from patient, past medical records and ER records.     Inpatient consult to General Surgery  Consult performed by: Ayesha Hendrickson MD  Consult ordered by: Jonathan Cummings DO        Subjective:     Principal Problem: <principal problem not specified>    History of Present Illness: Anayeli Judd is a 76 y.o. female with hx of GERD, HLD, hypothyroidism, DM II, IBS, transverse colon cancer s/p resection (2010), multiple SBO s/p ex lap x2, SBR, urinary incontinence who presents to the ED with abdominal pain and nausea. She has been worked up for chronic abdominal pain and diarrhea and has been admitted multiple times.      Her SBO history includes ex lap with Dr. Silvestre in 2014 - had SBRx2 and enterolysis after failed conservative management. Then again in 2017, underwent ex lap with Dr. aWrd. Had small bowel repair x 14 and placement of G tube. She had dense adhesions that required lysis for >2 hours.     She presents today with several days of generalized, crampy abdominal pain that she feels comes in waves and has been worsening. She has continued to have bowel movements, last BM this am. She had one instance of nausea without vomiting this am. She reports no other symptoms.          No current facility-administered medications on file prior to encounter.     Current Outpatient Medications on File Prior to Encounter   Medication Sig    ascorbic Acid (VITAMIN C) 500 mg CpSR Take 500 mg by mouth once daily.     atorvastatin (LIPITOR) 40 MG tablet TAKE 1 TABLET BY MOUTH  DAILY    azelastine (ASTELIN) 137 mcg (0.1 %) nasal spray 2 sprays (274 mcg total) by Nasal route 2 (two) times daily.    biotin  5,000 mcg TbDL Take 1 tablet by mouth once daily.     butalbital-acetaminophen-caffeine -40 mg (FIORICET, ESGIC) -40 mg per tablet SMARTSI Tablet(s) By Mouth 1 to 3 Times Daily PRN    cholestyramine-aspartame (CHOLESTYRAMINE LIGHT) 4 gram PwPk Take 1 packet (4 g total) by mouth daily as needed (Diarrhea).    clotrimazole-betamethasone 1-0.05% (LOTRISONE) cream Apply topically 2 (two) times daily.    co-enzyme Q-10 30 mg capsule Take 30 mg by mouth 3 (three) times daily.    conjugated estrogens (PREMARIN) vaginal cream INSERT 1/2 GRAM VAGINALLY  TWICE WEEKLY    cranberry extract 200 mg Cap Take 1 capsule by mouth once daily.     D-MANNOSE ORAL Take 1 tablet by mouth once daily.     DULoxetine (CYMBALTA) 30 MG capsule Take 1 capsule (30 mg total) by mouth 2 (two) times daily.    flash glucose sensor (FREESTYLE EFREN 14 DAY SENSOR) Kit 1 application by Misc.(Non-Drug; Combo Route) route every 14 (fourteen) days. Disp 30 or 90 day refill    FREESTYLE EFREN 10 DAY READER Misc TEST as directed    FREESTYLE LITE STRIPS Strp TEST DAILY AS DIRECTED    hydrocortisone 2.5 % cream Apply topically 2 (two) times daily as needed.    levothyroxine (SYNTHROID) 75 MCG tablet Take 1 tablet (75 mcg total) by mouth before breakfast.    magnesium 30 mg Tab Take 500 capsules by mouth.     metFORMIN (GLUCOPHAGE) 500 MG tablet Take 500 mg by mouth 3 (three) times daily.    mirabegron (MYRBETRIQ) 50 mg Tb24 Take 1 tablet (50 mg total) by mouth once daily.    montelukast (SINGULAIR) 10 mg tablet TAKE 1 TABLET BY MOUTH  DAILY    omega 3-dha-epa-fish oil 1,200 (144-216) mg Cap Take 1 capsule by mouth once daily.     ondansetron (ZOFRAN-ODT) 8 MG TbDL Take 1 tablet (8 mg total) by mouth every 8 (eight) hours as needed (nausea).    pantoprazole (PROTONIX) 20 MG tablet Take 1 tablet (20 mg total) by mouth once daily.    polyethylene glycol (GLYCOLAX) 17 gram/dose powder Take 17 g by mouth before dinner.     promethazine-codeine 6.25-10 mg/5 ml (PHENERGAN WITH CODEINE) 6.25-10 mg/5 mL syrup Take 5 mLs by mouth every 4 to 6 hours as needed for Cough.    vitamin D 1000 units Tab Take 185 mg by mouth once daily. D3       Review of patient's allergies indicates:   Allergen Reactions    Dilaudid [hydromorphone (bulk)] Other (See Comments)     Oversedating, head burning. Pt prefers to avoid.       Codeine Nausea And Vomiting     Other reaction(s): Itching    Dilaudid [hydromorphone]     Percocet  [oxycodone-acetaminophen]      Other reaction(s): Itching    Sulfa (sulfonamide antibiotics)      Other reaction(s): Nausea  Other reaction(s): Itching       Past Medical History:   Diagnosis Date    Abdominal pain     Allergic rhinitis     Arthritis     Blood platelet disorder     Blood platelet disorder     Blood transfusion     during delivery and     Bowel obstruction     Cervical radiculopathy     followed by dr cloud    Colon cancer     transverse colon; resected; Stage IIA (pT3 pN0 MX)    Diabetes mellitus     Diarrhea     Falls 2020    Family history of breast cancer     Family history of colon cancer     Fatty liver     GERD (gastroesophageal reflux disease)     History of shingles     Hyperlipidemia     Hypothyroidism     Irritable bowel syndrome     Microscopic colitis     treated     Raynaud phenomenon     Raynaud's disease     Type 2 diabetes mellitus      Past Surgical History:   Procedure Laterality Date    APPENDECTOMY      BACK SURGERY      CARPAL TUNNEL RELEASE      bilateral      SECTION      CHOLECYSTECTOMY  1965    COLECTOMY  2011    Transverse colon resection by Dr. Aguirre    COLONOSCOPY N/A 2017    Procedure: COLONOSCOPY;  Surgeon: Manjit Alvarez MD;  Location: [facility] (4TH FLR);  Service: Endoscopy;  Laterality: N/A;    COLONOSCOPY N/A 2019    Procedure: COLONOSCOPY;  Surgeon: Ramiro Jefferson MD;  Location: [facility] (4TH  FLR);  Service: Endoscopy;  Laterality: N/A;  PM prep    CYSTOSCOPY N/A 11/9/2021    Procedure: CYSTOSCOPY;  Surgeon: Viridiana Valenzuela MD;  Location: [facility] OR 1ST FLR;  Service: Urology;  Laterality: N/A;    ENDOSCOPIC ULTRASOUND OF UPPER GASTROINTESTINAL TRACT N/A 12/12/2018    Procedure: ULTRASOUND, UPPER GI TRACT, ENDOSCOPIC;  Surgeon: Jose Hess MD;  Location: [facility] ENDO;  Service: Endoscopy;  Laterality: N/A;    ENDOSCOPIC ULTRASOUND OF UPPER GASTROINTESTINAL TRACT N/A 1/23/2019    Procedure: ULTRASOUND, UPPER GI TRACT, ENDOSCOPIC;  Surgeon: Jose Hess MD;  Location: [facility] ENDO (2ND FLR);  Service: Endoscopy;  Laterality: N/A;    ESOPHAGOGASTRODUODENOSCOPY N/A 11/16/2018    Procedure: EGD (ESOPHAGOGASTRODUODENOSCOPY);  Surgeon: Angelo Reynolds MD;  Location: [facility] ENDO (2ND FLR);  Service: Endoscopy;  Laterality: N/A;    ESOPHAGOGASTRODUODENOSCOPY N/A 6/26/2019    Procedure: EGD (ESOPHAGOGASTRODUODENOSCOPY);  Surgeon: Ramiro Jefferson MD;  Location: [facility] (4TH FLR);  Service: Endoscopy;  Laterality: N/A;    EYE SURGERY      Cataract Removal    FLUOROSCOPIC URODYNAMIC STUDY N/A 11/9/2021    Procedure: URODYNAMIC STUDY, FLUOROSCOPIC;  Surgeon: Viridiana Valenzuela MD;  Location: [facility] OR 1ST FLR;  Service: Urology;  Laterality: N/A;  90 minutes     HYSTERECTOMY      posterolateral fusion with autograft bone and Mccammon mineralized bone matrix  2/1/13    at [facility] for lumbar spine stenosis    TOE SURGERY      TONSILLECTOMY      TRIGGER FINGER RELEASE       Family History     Problem Relation (Age of Onset)    Alcohol abuse Brother, Brother    Arthritis Daughter, Brother    Asthma Daughter    Bladder Cancer Mother    Breast cancer Sister (79)    Cataracts Mother    Colon cancer Father, Brother (70)    Depression Daughter, Daughter    Glaucoma Mother    Heart disease Father (50), Mother    Hyperlipidemia Mother    Hypertension Daughter    Kidney disease Mother    Parkinsonism Brother     Stroke Daughter (40)        Tobacco Use    Smoking status: Never Smoker    Smokeless tobacco: Never Used   Substance and Sexual Activity    Alcohol use: Yes     Comment: socially, hardly ever    Drug use: No    Sexual activity: Never     Birth control/protection: Abstinence     Review of Systems   Constitutional: Positive for fatigue and fever. Negative for activity change, appetite change and chills.   Respiratory: Negative for shortness of breath.    Cardiovascular: Negative for chest pain and palpitations.   Gastrointestinal: Positive for abdominal pain, constipation and nausea. Negative for abdominal distention, diarrhea and vomiting.   Musculoskeletal: Negative for myalgias.   Skin: Negative for wound.   Psychiatric/Behavioral: Negative for behavioral problems and decreased concentration. The patient is nervous/anxious.    All other systems reviewed and are negative.    Objective:     Vital Signs (Most Recent):  Temp: 97.7 °F (36.5 °C) (01/12/22 1545)  Pulse: 62 (01/12/22 1711)  Resp: 18 (01/12/22 1718)  BP: (!) 102/58 (01/12/22 1711)  SpO2: 99 % (01/12/22 1711) Vital Signs (24h Range):  Temp:  [97.7 °F (36.5 °C)-98 °F (36.7 °C)] 97.7 °F (36.5 °C)  Pulse:  [62-74] 62  Resp:  [16-18] 18  SpO2:  [99 %-100 %] 99 %  BP: (102-118)/(56-71) 102/58        There is no height or weight on file to calculate BMI.    Physical Exam  Vitals and nursing note reviewed.   Constitutional:       Appearance: Normal appearance.   HENT:      Head: Normocephalic and atraumatic.   Cardiovascular:      Rate and Rhythm: Normal rate and regular rhythm.   Pulmonary:      Effort: Pulmonary effort is normal. No respiratory distress.   Abdominal:      General: Abdomen is flat. There is no distension.      Palpations: Abdomen is soft. There is no mass.      Tenderness: There is abdominal tenderness. There is no guarding or rebound.      Hernia: No hernia is present.      Comments: Scars from multiple abdominal surgeries  Mild tenderness  to palpation     Skin:     General: Skin is warm and dry.   Neurological:      General: No focal deficit present.      Mental Status: She is alert and oriented to person, place, and time. Mental status is at baseline.   Psychiatric:         Mood and Affect: Mood normal.         Behavior: Behavior normal.         Significant Labs:  CBC:   Recent Labs   Lab 01/12/22  1419 01/12/22  1429   WBC 6.24  --    RBC 3.70*  --    HGB 12.0  --    HCT 37.2 35*   *  --    *  --    MCH 32.4*  --    MCHC 32.3  --      CMP:   Recent Labs   Lab 01/12/22  1419   *   CALCIUM 9.7   ALBUMIN 3.7   PROT 7.2   *   K 4.1   CO2 27      BUN 13   CREATININE 0.8   ALKPHOS 89   ALT 24   AST 26   BILITOT 1.6*       Significant Diagnostics:  I have reviewed all pertinent imaging results/findings within the past 24 hours.    Assessment/Plan:     Generalized abdominal pain  76 yo female with chronic abdominal pain, constipation, h/o multiple abdominal surgeries and admission for suspected bowel obstructions. Her clinical history this episode and exam is inconsistent with bowel obstruction - this episode is most likely a functional problem.     - CT abd/pelvis reviewed. It looks very similar to CT from 01/02 on last admission. Fecalization of stool within the small bowel consistent with chronic constipation  - Vascular inflow looks good, ischemic colitis unlikely.  - Would recommend clear liquids, healthy bowel regimen, PRN pain management trying to minimize narcotics  - No acute surgical intervention warranted   - Would recommend consult to internal medicine and possible inpatient GI consult for optimization of bowel regimen, GI motility, and dietary modifications that may assist in her symptomatology.       VTE Risk Mitigation (From admission, onward)    None          Thank you for your consult. I will follow-up with patient. Please contact us if you have any additional questions.    Ayesha Hendrickson MD  General  Surgery  [facility]

## 2022-01-12 NOTE — ASSESSMENT & PLAN NOTE
76 yo female with chronic abdominal pain, constipation, h/o multiple abdominal surgeries and admission for suspected bowel obstructions. Her clinical history this episode and exam is inconsistent with bowel obstruction - this episode is most likely a functional problem.     - CT abd/pelvis reviewed. It looks very similar to CT from 01/02 on last admission. Fecalization of stool within the small bowel consistent with chronic constipation  - Vascular inflow looks good, ischemic colitis unlikely.  - No acute surgical intervention warranted   - Would recommend consult to internal medicine and possible inpatient GI consult for optimization of bowel regimen, GI motility, and dietary modifications that may assist in her symptomatology.

## 2022-01-12 NOTE — ED NOTES
I-STAT Chem-8+ Results:   Value Reference Range   Sodium 138 136-145 mmol/L   Potassium  4.1 3.5-5.1 mmol/L   Chloride 101  mmol/L   Ionized Calcium 1.27 1.06-1.42 mmol/L   CO2 (measured) 27 23-29 mmol/L   Glucose 146  mg/dL   BUN 13 6-30 mg/dL   Creatinine 0.8 0.5-1.4 mg/dL   Hematocrit 35 36-54%

## 2022-01-12 NOTE — ED PROVIDER NOTES
Encounter Date: 1/12/2022    SCRIBE #1 NOTE: I, Kathie Dee , am scribing for, and in the presence of,  Jonathan Cummings DO. I have scribed the following portions of the note - Other sections scribed: HPI ROS.       History     Chief Complaint   Patient presents with    Abdominal Pain     HPI   Anayeli Judd is a 77 y.o. female, with a PMHx of Bowel obstruction, Colon cancer, Diabetes mellitus, GERD, Hyperlipidemia, Hypothyroidism, Raynaud's disease, and  Type 2 diabetes mellitus , who presents to the ED with abdominal pain. Patient states she have severe generalized abdominal pain onset last night and this morning. She states she has scar tissue from being constipated and from previous bowel obstruction. She states the last time she came to the ED they did a CT with contrast and they found she was severly constipated. She was prescribed Miralax for her symptoms. Patient states she is nauseous, have chills, and a fever. Patient denies vomiting, or urinary changes.  Pain is worsened by palpation along her mid abdomen radiating to right lower abdomen.  She reports her last BM was this morning and was soft and after she had taken the MiraLax.  She reports worsening nausea today.  She denies any associated chest pain , shortness of breath, cough, diarrhea, lightheadedness or dizziness.  No other exacerbating or alleviating factors. No other associated symptoms.      Review of patient's allergies indicates:   Allergen Reactions    Dilaudid [hydromorphone (bulk)] Other (See Comments)     Oversedating, head burning. Pt prefers to avoid.       Codeine Nausea And Vomiting     Other reaction(s): Itching    Dilaudid [hydromorphone]     Percocet  [oxycodone-acetaminophen]      Other reaction(s): Itching    Sulfa (sulfonamide antibiotics)      Other reaction(s): Nausea  Other reaction(s): Itching     Past Medical History:   Diagnosis Date    Abdominal pain     Allergic rhinitis     Arthritis     Blood platelet  disorder     Blood platelet disorder     Blood transfusion     during delivery and     Bowel obstruction     Cervical radiculopathy     followed by dr cloud    Colon cancer     transverse colon; resected; Stage IIA (pT3 pN0 MX)    Diabetes mellitus     Diarrhea     Falls 2020    Family history of breast cancer     Family history of colon cancer     Fatty liver     GERD (gastroesophageal reflux disease)     History of shingles     Hyperlipidemia     Hypothyroidism     Irritable bowel syndrome     Microscopic colitis     treated     Raynaud phenomenon     Raynaud's disease     Type 2 diabetes mellitus      Past Surgical History:   Procedure Laterality Date    APPENDECTOMY      BACK SURGERY      CARPAL TUNNEL RELEASE      bilateral      SECTION      CHOLECYSTECTOMY  1965    COLECTOMY  2011    Transverse colon resection by Dr. Aguirre    COLONOSCOPY N/A 2017    Procedure: COLONOSCOPY;  Surgeon: Manjit Alvarez MD;  Location: [facility] (4TH FLR);  Service: Endoscopy;  Laterality: N/A;    COLONOSCOPY N/A 2019    Procedure: COLONOSCOPY;  Surgeon: Ramiro Jefferson MD;  Location: [facility] (4TH FLR);  Service: Endoscopy;  Laterality: N/A;  PM prep    CYSTOSCOPY N/A 2021    Procedure: CYSTOSCOPY;  Surgeon: Viridiana Valenzuela MD;  [address]  Service: Urology;  Laterality: N/A;    ENDOSCOPIC ULTRASOUND OF UPPER GASTROINTESTINAL TRACT N/A 2018    Procedure: ULTRASOUND, UPPER GI TRACT, ENDOSCOPIC;  Surgeon: Jose Hess MD;  Location: [facility];  Service: Endoscopy;  Laterality: N/A;    ENDOSCOPIC ULTRASOUND OF UPPER GASTROINTESTINAL TRACT N/A 2019    Procedure: ULTRASOUND, UPPER GI TRACT, ENDOSCOPIC;  Surgeon: Jose Hess MD;  Location: [facility] (2ND FLR);  Service: Endoscopy;  Laterality: N/A;    ESOPHAGOGASTRODUODENOSCOPY N/A 2018    Procedure: EGD (ESOPHAGOGASTRODUODENOSCOPY);  Surgeon: Angelo LARRY  MD Rodolfo;  Location: [facility] (2ND FLR);  Service: Endoscopy;  Laterality: N/A;    ESOPHAGOGASTRODUODENOSCOPY N/A 6/26/2019    Procedure: EGD (ESOPHAGOGASTRODUODENOSCOPY);  Surgeon: Ramiro Jefferson MD;  Location: [facility] (4TH FLR);  Service: Endoscopy;  Laterality: N/A;    EYE SURGERY      Cataract Removal    FLUOROSCOPIC URODYNAMIC STUDY N/A 11/9/2021    Procedure: URODYNAMIC STUDY, FLUOROSCOPIC;  Surgeon: Viridiana Valenzuela MD;  Location: [facility] OR 1ST FLR;  Service: Urology;  Laterality: N/A;  90 minutes     HYSTERECTOMY      posterolateral fusion with autograft bone and Eun mineralized bone matrix  2/1/13    at [facility] for lumbar spine stenosis    TOE SURGERY      TONSILLECTOMY      TRIGGER FINGER RELEASE       Family History   Problem Relation Age of Onset    Heart disease Father 50        Mi age 50    Colon cancer Father     Bladder Cancer Mother         non smoker    Cataracts Mother     Glaucoma Mother     Heart disease Mother     Hyperlipidemia Mother     Kidney disease Mother     Breast cancer Sister 79    Arthritis Daughter     Asthma Daughter     Depression Daughter     Hypertension Daughter     Stroke Daughter 40    Arthritis Brother     Colon cancer Brother 70    Alcohol abuse Brother     Parkinsonism Brother     Alcohol abuse Brother     Depression Daughter     Celiac disease Neg Hx     Cirrhosis Neg Hx     Colon polyps Neg Hx     Crohn's disease Neg Hx     Cystic fibrosis Neg Hx     Esophageal cancer Neg Hx     Hemochromatosis Neg Hx     Inflammatory bowel disease Neg Hx     Irritable bowel syndrome Neg Hx     Liver cancer Neg Hx     Liver disease Neg Hx     Rectal cancer Neg Hx     Stomach cancer Neg Hx     Ulcerative colitis Neg Hx     Eliazar's disease Neg Hx     Amblyopia Neg Hx     Blindness Neg Hx     Macular degeneration Neg Hx     Retinal detachment Neg Hx     Strabismus Neg Hx     Melanoma Neg Hx      Social History     Tobacco Use     Smoking status: Never Smoker    Smokeless tobacco: Never Used   Substance Use Topics    Alcohol use: Yes     Comment: socially, hardly ever    Drug use: No     Review of Systems   Constitutional: Positive for chills and fever.   HENT: Negative for congestion and sore throat.    Respiratory: Negative for cough and shortness of breath.    Cardiovascular: Negative for chest pain and palpitations.   Gastrointestinal: Positive for abdominal pain, constipation and nausea. Negative for vomiting.   Genitourinary: Negative for dysuria, frequency, hematuria and urgency.   Musculoskeletal: Negative for arthralgias and neck pain.   Skin: Negative for color change and wound.   Neurological: Negative for light-headedness, numbness and headaches.   Psychiatric/Behavioral: Negative for confusion and hallucinations.       Physical Exam     Initial Vitals [01/12/22 1137]   BP Pulse Resp Temp SpO2   118/71 74 18 98 °F (36.7 °C) 100 %      MAP       --         Physical Exam    Nursing note and vitals reviewed.         Gen/Constitutional: Interactive.  Moderatel emotionally distressed secondary to pain.  Head: Normocephalic, Atraumatic  Neck: supple, no masses or LAD, no JVD  Eyes: PERRLA, conjunctiva clear  Ears, Nose and Throat: No rhinorrhea or stridor.  Cardiac:  Regular rate, Reg Rhythm, No murmur  Pulmonary: CTA Bilat, no wheezes, rhonchi, rales.  No increased work of breathing.  GI: Abdomen soft, diffusely tender, non-distended; no rebound or guarding  : No CVA tenderness.  Positive suprapubic tenderness  Musculoskeletal: Extremities warm, well perfused, no erythema, no edema  Skin: No rashes, cyanosis or jaundice.  Neuro: Alert and Oriented x 3; No focal motor or sensory deficits.    Psych: Normal affect      ED Course   Procedures  Labs Reviewed   CBC W/ AUTO DIFFERENTIAL - Abnormal; Notable for the following components:       Result Value    RBC 3.70 (*)      (*)     MCH 32.4 (*)     Platelets 104 (*)     All other  components within normal limits   COMPREHENSIVE METABOLIC PANEL - Abnormal; Notable for the following components:    Sodium 134 (*)     Glucose 148 (*)     Total Bilirubin 1.6 (*)     Anion Gap 6 (*)     All other components within normal limits   URINALYSIS, REFLEX TO URINE CULTURE - Abnormal; Notable for the following components:    Appearance, UA Hazy (*)     Leukocytes, UA 3+ (*)     All other components within normal limits    Narrative:     Specimen Source->Urine   URINALYSIS MICROSCOPIC - Abnormal; Notable for the following components:    RBC, UA 6 (*)     WBC, UA >100 (*)     All other components within normal limits    Narrative:     Specimen Source->Urine   ISTAT PROCEDURE - Abnormal; Notable for the following components:    POC Glucose 146 (*)     POC Hematocrit 35 (*)     All other components within normal limits   SARS-COV-2 RDRP GENE - Normal    Narrative:     This test utilizes isothermal nucleic acid amplification   technology to detect the SARS-CoV-2 RdRp nucleic acid segment.   The analytical sensitivity (limit of detection) is 125 genome   equivalents/mL.   A POSITIVE result implies infection with the SARS-CoV-2 virus;   the patient is presumed to be contagious.     A NEGATIVE result means that SARS-CoV-2 nucleic acids are not   present above the limit of detection. A NEGATIVE result should be   treated as presumptive. It does not rule out the possibility of   COVID-19 and should not be the sole basis for treatment decisions.   If COVID-19 is strongly suspected based on clinical and exposure   history, re-testing using an alternate molecular assay should be   considered.   This test is only for use under the Food and Drug   Administration s Emergency Use Authorization (EUA).   Commercial kits are provided by [employer].   Performance characteristics of the EUA have been independently   verified by [facility] Department of   Pathology and Laboratory Medicine.    _________________________________________________________________   The authorized Fact Sheet for Healthcare Providers and the authorized Fact   Sheet for Patients of the ID NOW COVID-19 are available on the FDA   website:     https://www.fda.gov/media/415384/download  https://www.fda.gov/media/375113/download       CULTURE, URINE   LIPASE   LACTIC ACID, PLASMA   POCT GLUCOSE, HAND-HELD DEVICE   ISTAT CHEM8     EKG Readings: (Independently Interpreted)   Initial Reading: No STEMI. Previous EKG: Compared with most recent EKG Rhythm: Normal Sinus Rhythm. Heart Rate: 69. Conduction: Normal. ST Segments: Non-Specific ST Segment Depression. Other Findings: Shortened NY Interval.     ECG Results          EKG 12-lead (Final result)  Result time 01/12/22 13:40:59    Final result by Interface, Lab In [facility] (01/12/22 13:40:59)                 Narrative:    Test Reason : R10.9,    Vent. Rate : 069 BPM     Atrial Rate : 069 BPM     P-R Int : 110 ms          QRS Dur : 082 ms      QT Int : 416 ms       P-R-T Axes : 040 067 053 degrees     QTc Int : 445 ms    Sinus rhythm with sinus arrhythmia with short [place]  Nonspecific ST and/or T wave abnormalities  Abnormal ECG  When compared with ECG of 01-JAN-2022 22:03,  No significant change was found  Confirmed by Neela Platt MD ([id number]) on 1/12/2022 1:40:52 PM    Referred By: [email or web address]   SELF           Confirmed By:Neela Platt MD                            Imaging Results           CT Abdomen Pelvis With Contrast (Final result)  Result time 01/12/22 16:09:40    Final result by Guerrero Busch Jr., MD (01/12/22 16:09:40)                 Impression:      Segment of dilated small bowel with fecalization of the luminal material measuring up to 3.8 cm with likely transition point in the right lower quadrant as described above.  Findings likely represent partial small bowel obstruction.  Increased soft tissue and close approximation of the small bowel loops to the anterior abdominal wall  likely representing adhesions.    Lateralization of the position of the small bowel in regards to the ascending colon potentially representing internal hernia.    Stable mild right hydronephrosis.    Splenomegaly.    Diffusely hypoattenuating liver suggestive of steatosis.  Consider correlation with LFTs.  Stable nonspecific enhancing focus within the right hepatic dome with potentially flash filling hemangioma.    Increased soft tissue about the celiac axis and SMA as well as multiple mesenteric and olga hepatis nodes.    Additional findings as above.    This report was flagged in Epic as abnormal.    Electronically signed by resident: Primo Stoddard  Date:    01/12/2022  Time:    15:23    Electronically signed by: Guerrero Busch MD  Date:    01/12/2022  Time:    16:09             Narrative:    EXAMINATION:  CT ABDOMEN PELVIS WITH CONTRAST    CLINICAL HISTORY:  Bowel obstruction suspected;Abdominal pain, acute, nonlocalized;    TECHNIQUE:  The patient was surveyed from the lung bases through the pelvis after the administration of 75 cc Omni 350 IV contrast.  Oral contrast was not administered..  The data was reconstructed for coronal, sagittal, and axial images.    COMPARISON:  CT 01/02/2021, 10/15/2021, 05/14/2021    FINDINGS:  CHEST:    Lungs/Pleura: Mild bibasilar atelectasis.  No concerning nodules.  No focal consolidation, pneumothorax, or pleural fluid.    Heart: The visualized portions of the heart are normal. No pericardial effusion.    Thoracic soft tissues: Unremarkable    ABDOMEN:    Liver: Liver appears normal in size.  Liver appears diffusely hypoattenuating suggesting hepatic steatosis.  Indeterminate arterial enhancing foci in the right hepatic dome and another possible small enhancing focus within the left hepatic lobe (series 2, image 43), potentially representing flash filling hemangiomas.    Gallbladder/Bile ducts: Gallbladder is surgically absent.  Common bile duct is within normal limits  status post cholecystectomy.    Spleen:Mild splenomegaly.  Accessory splenule noted.    Stomach: Small hiatal hernia.    Pancreas: Fatty atrophy of the pancreas.  Subcentimeter hypodensity in the pancreatic body (series 2, image 46) potentially representing interspersed fat within the pancreatic parenchyma, side branch IPMN, pseudocyst, or other cystic neoplasm.    Adrenals: Unremarkable.    Renal/Ureters: The kidneys are normal in size and location. Mild right hydronephrosis similar to prior exam.  No left hydronephrosis.  No solid enhancing masses or nephrolithiasis.  Punctate hypodensities in the right midpole and upper pole too small to characterize but likely cyst.  The ureters are normal in course and caliber. The urinary bladder is unremarkable.    Reproductive: Uterus is absent.    Bowel: Postsurgical changes of partial small bowel resection.  Segment of dilated small bowel along the midline which demonstrates dilation up to 3.8 cm with fecalization of luminal material.  Transition point can be seen on series 2, image 106 and coronal images series 601, image 99.  Collapsed bowel distally and proximally to the dilated portions of the bowel.  These bowel loops closely approximates the ventral abdominal wall with greater than expected soft tissue stranding postsurgical along the ventral abdominal wall, likely adhesions.  Scattered segments of small bowel wall thickening within the right lower quadrant likely due to nondistention.  Stable stranding along the ascending colon and right lower quadrant, potentially postsurgical.  No evidence of portal venous gas or pneumatosis at this time.  Ascending colon appears decompressed compared to the prior exam.  There is loops of small bowel lateral to the ascending colon suggesting internal hernia or postop change.    Peritoneum: no ascites, free fluid, or intraperitoneal free air.    Lymph Nodes: Multiple prominent mesenteric lymph nodes within the midline/right lower  quadrant without definite pathologic enlargement, similar to prior.  Multiple nodes about the olga hepatis.  Increased soft tissue about the celiac and SMA also appears similar.    Aorta: The abdominal aorta is normal in course and caliber.  Mild-to-moderate atherosclerotic calcifications.    Bones: Grade 2 anterolisthesis of L5 on S1 and grade 1 anterolisthesis of L4 on L5.  There is partial degenerative fusion of L5-S1.  Bilateral L5 pars defects.  Extensive degenerative change of the spine.  No acute fractures or osseous destructive lesions.    Soft Tissues: Ventral abdominal wall scarring.                              X-Rays:   Independently Interpreted Readings:   Abdomen:   Abdomen and Pelvis CT with Contrast - Dilated small bowel with fecalization with a transition point in the right lower quadrant concerning for developing or partial small bowel obstruction.  No abscess or perforation     Medications   0.9%  NaCl infusion ( Intravenous New Bag 1/12/22 1717)   acetaminophen tablet 1,000 mg (1,000 mg Oral Given 1/13/22 0625)   oxyCODONE immediate release tablet 5 mg (has no administration in time range)   ondansetron injection 4 mg (has no administration in time range)   promethazine (PHENERGAN) 6.25 mg in dextrose 5 % 50 mL IVPB (has no administration in time range)   diphenhydrAMINE capsule 25 mg (has no administration in time range)   atorvastatin tablet 40 mg (has no administration in time range)   DULoxetine DR capsule 30 mg (30 mg Oral Given 1/12/22 2052)   levothyroxine tablet 75 mcg (75 mcg Oral Given 1/13/22 0625)   montelukast tablet 10 mg (has no administration in time range)   pantoprazole EC tablet 20 mg (has no administration in time range)   azelastine 137 mcg (0.1 %) nasal spray 274 mcg (274 mcg Nasal Given 1/12/22 2112)   butalbital-acetaminophen-caffeine -40 mg per tablet 1 tablet (has no administration in time range)   oxybutynin 24 hr tablet 10 mg (has no administration in time  range)   sodium chloride 0.9% flush 10 mL (has no administration in time range)   glucose chewable tablet 16 g (has no administration in time range)   glucose chewable tablet 24 g (has no administration in time range)   dextrose 50% injection 12.5 g (has no administration in time range)   dextrose 50% injection 25 g (has no administration in time range)   glucagon (human recombinant) injection 1 mg (has no administration in time range)   naloxone 0.4 mg/mL injection 0.02 mg (has no administration in time range)   enoxaparin injection 40 mg (has no administration in time range)   melatonin tablet 6 mg (has no administration in time range)   insulin aspart U-100 pen 0-5 Units (has no administration in time range)   polyethylene glycol packet 17 g (has no administration in time range)   senna-docusate 8.6-50 mg per tablet 1 tablet (has no administration in time range)   morphine injection 2 mg (2 mg Intravenous Given 1/12/22 2351)   ciprofloxacin (CIPRO)400mg/200ml D5W IVPB 400 mg (0 mg Intravenous Stopped 1/13/22 0051)   ondansetron injection 4 mg (4 mg Intravenous Given 1/12/22 1230)   dicyclomine capsule 10 mg (10 mg Oral Given 1/12/22 1324)   morphine injection 2 mg (2 mg Intravenous Given 1/12/22 1447)   iohexoL (OMNIPAQUE 350) injection 75 mL (75 mLs Intravenous Given 1/12/22 1500)   cefTRIAXone (ROCEPHIN) 1 g/50 mL D5W IVPB (0 g Intravenous Stopped 1/12/22 1624)   morphine injection 2 mg (2 mg Intravenous Given 1/12/22 1718)   polyethylene glycol packet 17 g (17 g Oral Given 1/12/22 2055)     Medical Decision Making:   History:   Old Medical Records: I decided to obtain old medical records.  Old Records Summarized: records from previous admission(s).  Initial Assessment:   Anayeli Judd is a 77 y.o. female, with a PMHx of Bowel obstruction, Colon cancer, Diabetes mellitus, GERD, Hyperlipidemia, Hypothyroidism, Raynaud's disease, and  Type 2 diabetes mellitus , who presents to the ED with abdominal pain.    Differential Diagnosis:   Small-bowel obstruction, constipation, obstipation, obstruction of the colon, cancer, perforation, UTI, intra-abdominal abscess  Independently Interpreted Test(s):   I have ordered and independently interpreted X-rays - see prior notes.  I have ordered and independently interpreted EKG Reading(s) - see prior notes  Clinical Tests:   Lab Tests: Ordered and Reviewed  Radiological Study: Ordered and Reviewed  Medical Tests: Ordered and Reviewed  Other:   I have discussed this case with another health care provider.       <> Summary of the Discussion: General surgery    Emergent evaluation of a patient presenting with severe abdominal pain in the setting of recent abdominal obstruction.  She is currently afebrile and vital signs are stable.  Physical exam unremarkable for diffuse tenderness along the abdomen and suprapubic region worse with palpation.  No peritoneal signs on my exam.  Broad workup given age and concern for obstruction.  ECG, IV line, labs and CT abdomen pelvis with contrast were obtained.  UA was obtained which shows 3+ leukocytes, greater than 100 wbc's, will treat with ceftriaxone in the ED.  Will continue management once cultures are obtained for appropriate antibiotics.  Labs with no significant leukocytosis, total bili 1.6, CBC with thrombocytopenia similar to previous labs, no significant LFT elevation, lipase normal and lactate normal at this time.  Patient was given Zofran and morphine for antiemetic and pain control while in the ED.  She was placed on IV fluids at 75 mL/hour for maintenance and placed on NPO Given obstructive-type symptoms.  CT scan shows transition point and bowel dilation concerning for early obstruction or partial obstruction.  Discussed case with General surgery who will evaluate the patient for admission.  The patient was signed out to the oncoming ED physician, Dr. Adam with final disposition pending General surgery evaluation with likely  plan for admission.     Complexity: High - level 5          Scribe Attestation:   Scribe #1: I performed the above scribed service and the documentation accurately describes the services I performed. I attest to the accuracy of the note.               I, Dr. Jonathan Cummings, personally performed the services described in this documentation. All medical record entries made by the scribe were at my direction and in my presence.  I have reviewed the chart and agree that the record reflects my personal performance and is accurate and complete.     Clinical Impression:   Final diagnoses:  [R10.9] Abdominal pain  [K56.600] Partial small bowel obstruction (Primary)          ED Disposition Condition    Observation             Jonathan Cummings DO, FAAEM  Emergency Staff Physician   Dept of Emergency Medicine   [facility]  Spectralink: [id number]        Disclaimer: This note has been generated using voice-recognition software. There may be typographical errors that have been missed during proof-reading.         Jonathan Cummings DO  01/13/22 0755

## 2022-01-12 NOTE — ED TRIAGE NOTES
Pt reports abdominal pain and nausea. PT reports inconsistent BM. Pt denies diarrhea and vomiting.     LOC: The patient is awake, alert and aware of environment with an appropriate affect, the patient is oriented x 3 and speaking appropriately.  APPEARANCE: Patient resting comfortably and in no acute distress, patient is clean and well groomed, patient's clothing is properly fastened.  SKIN: The skin is warm and dry, color consistent with ethnicity, patient has normal skin turgor and moist mucus membranes, skin intact, no breakdown or bruising noted.  MUSCULOSKELETAL: Patient moving all extremities spontaneously, no obvious swelling or deformities noted.  RESPIRATORY: Airway is open and patent, respirations are spontaneous, patient has a normal effort and rate, no accessory muscle use noted,  CARDIAC: Patient has a normal rate and regular rhythm, no periphreal edema noted, capillary refill < 3 seconds.  ABDOMEN: Soft and non tender to palpation, no distention noted, normoactive bowel sounds present in all four quadrants.  NEUROLOGIC:  facial expression is symmetrical, patient moving all extremities spontaneously, normal sensation in all extremities when touched with a finger.  Follows all commands appropriately.

## 2022-01-12 NOTE — HPI
Anayeli Judd is a 76 y.o. female with hx of GERD, HLD, hypothyroidism, DM II, IBS, transverse colon cancer s/p resection (2010), multiple SBO s/p ex lap x2, SBR, urinary incontinence who presents to the ED with abdominal pain and nausea. She has been worked up for chronic abdominal pain and diarrhea and has been admitted multiple times.      Her SBO history includes ex lap with Dr. Silvestre in 2014 - had SBRx2 and enterolysis after failed conservative management. Then again in 2017, underwent ex lap with Dr. Ward. Had small bowel repair x 14 and placement of G tube. She had dense adhesions that required lysis for >2 hours.     She presents today with several days of generalized, crampy abdominal pain that she feels comes in waves and has been worsening. She has continued to have bowel movements, last BM this am. She had one instance of nausea without vomiting this am. She reports no other symptoms.

## 2022-01-12 NOTE — SUBJECTIVE & OBJECTIVE
No current facility-administered medications on file prior to encounter.     Current Outpatient Medications on File Prior to Encounter   Medication Sig    ascorbic Acid (VITAMIN C) 500 mg CpSR Take 500 mg by mouth once daily.     atorvastatin (LIPITOR) 40 MG tablet TAKE 1 TABLET BY MOUTH  DAILY    azelastine (ASTELIN) 137 mcg (0.1 %) nasal spray 2 sprays (274 mcg total) by Nasal route 2 (two) times daily.    biotin 5,000 mcg TbDL Take 1 tablet by mouth once daily.     butalbital-acetaminophen-caffeine -40 mg (FIORICET, ESGIC) -40 mg per tablet SMARTSI Tablet(s) By Mouth 1 to 3 Times Daily PRN    cholestyramine-aspartame (CHOLESTYRAMINE LIGHT) 4 gram PwPk Take 1 packet (4 g total) by mouth daily as needed (Diarrhea).    clotrimazole-betamethasone 1-0.05% (LOTRISONE) cream Apply topically 2 (two) times daily.    co-enzyme Q-10 30 mg capsule Take 30 mg by mouth 3 (three) times daily.    conjugated estrogens (PREMARIN) vaginal cream INSERT 1/2 GRAM VAGINALLY  TWICE WEEKLY    cranberry extract 200 mg Cap Take 1 capsule by mouth once daily.     D-MANNOSE ORAL Take 1 tablet by mouth once daily.     DULoxetine (CYMBALTA) 30 MG capsule Take 1 capsule (30 mg total) by mouth 2 (two) times daily.    flash glucose sensor (FREESTYLE EFREN 14 DAY SENSOR) Kit 1 application by Misc.(Non-Drug; Combo Route) route every 14 (fourteen) days. Disp 30 or 90 day refill    FREESTYLE EFREN 10 DAY READER Misc TEST as directed    FREESTYLE LITE STRIPS Strp TEST DAILY AS DIRECTED    hydrocortisone 2.5 % cream Apply topically 2 (two) times daily as needed.    levothyroxine (SYNTHROID) 75 MCG tablet Take 1 tablet (75 mcg total) by mouth before breakfast.    magnesium 30 mg Tab Take 500 capsules by mouth.     metFORMIN (GLUCOPHAGE) 500 MG tablet Take 500 mg by mouth 3 (three) times daily.    mirabegron (MYRBETRIQ) 50 mg Tb24 Take 1 tablet (50 mg total) by mouth once daily.    montelukast (SINGULAIR) 10 mg tablet  TAKE 1 TABLET BY MOUTH  DAILY    omega 3-dha-epa-fish oil 1,200 (144-216) mg Cap Take 1 capsule by mouth once daily.     ondansetron (ZOFRAN-ODT) 8 MG TbDL Take 1 tablet (8 mg total) by mouth every 8 (eight) hours as needed (nausea).    pantoprazole (PROTONIX) 20 MG tablet Take 1 tablet (20 mg total) by mouth once daily.    polyethylene glycol (GLYCOLAX) 17 gram/dose powder Take 17 g by mouth before dinner.    promethazine-codeine 6.25-10 mg/5 ml (PHENERGAN WITH CODEINE) 6.25-10 mg/5 mL syrup Take 5 mLs by mouth every 4 to 6 hours as needed for Cough.    vitamin D 1000 units Tab Take 185 mg by mouth once daily. D3       Review of patient's allergies indicates:   Allergen Reactions    Dilaudid [hydromorphone (bulk)] Other (See Comments)     Oversedating, head burning. Pt prefers to avoid.       Codeine Nausea And Vomiting     Other reaction(s): Itching    Dilaudid [hydromorphone]     Percocet  [oxycodone-acetaminophen]      Other reaction(s): Itching    Sulfa (sulfonamide antibiotics)      Other reaction(s): Nausea  Other reaction(s): Itching       Past Medical History:   Diagnosis Date    Abdominal pain     Allergic rhinitis     Arthritis     Blood platelet disorder     Blood platelet disorder     Blood transfusion     during delivery and     Bowel obstruction     Cervical radiculopathy     followed by dr cloud    Colon cancer     transverse colon; resected; Stage IIA (pT3 pN0 MX)    Diabetes mellitus     Diarrhea     Falls 2020    Family history of breast cancer     Family history of colon cancer     Fatty liver     GERD (gastroesophageal reflux disease)     History of shingles     Hyperlipidemia     Hypothyroidism     Irritable bowel syndrome     Microscopic colitis     treated     Raynaud phenomenon     Raynaud's disease     Type 2 diabetes mellitus      Past Surgical History:   Procedure Laterality Date    APPENDECTOMY      BACK SURGERY      CARPAL  TUNNEL RELEASE      bilateral      SECTION      CHOLECYSTECTOMY  1965    COLECTOMY  2011    Transverse colon resection by Dr. Aguirre    COLONOSCOPY N/A 2017    Procedure: COLONOSCOPY;  Surgeon: Manjit Alvarez MD;  Location: [facility] (4TH FLR);  Service: Endoscopy;  Laterality: N/A;    COLONOSCOPY N/A 2019    Procedure: COLONOSCOPY;  Surgeon: Ramiro Jefferson MD;  Location: [facility] (4TH FLR);  Service: Endoscopy;  Laterality: N/A;  PM prep    CYSTOSCOPY N/A 2021    Procedure: CYSTOSCOPY;  Surgeon: Viridiana Valenzuela MD;  Location: [facility] OR 1ST FLR;  Service: Urology;  Laterality: N/A;    ENDOSCOPIC ULTRASOUND OF UPPER GASTROINTESTINAL TRACT N/A 2018    Procedure: ULTRASOUND, UPPER GI TRACT, ENDOSCOPIC;  Surgeon: Jose Hess MD;  Location: [facility];  Service: Endoscopy;  Laterality: N/A;    ENDOSCOPIC ULTRASOUND OF UPPER GASTROINTESTINAL TRACT N/A 2019    Procedure: ULTRASOUND, UPPER GI TRACT, ENDOSCOPIC;  Surgeon: Jose Hess MD;  Location: [facility] (2ND FLR);  Service: Endoscopy;  Laterality: N/A;    ESOPHAGOGASTRODUODENOSCOPY N/A 2018    Procedure: EGD (ESOPHAGOGASTRODUODENOSCOPY);  Surgeon: Angelo Reynolds MD;  Location: [facility] (2ND FLR);  Service: Endoscopy;  Laterality: N/A;    ESOPHAGOGASTRODUODENOSCOPY N/A 2019    Procedure: EGD (ESOPHAGOGASTRODUODENOSCOPY);  Surgeon: Ramiro Jefferson MD;  Location: [facility] (4TH FLR);  Service: Endoscopy;  Laterality: N/A;    EYE SURGERY      Cataract Removal    FLUOROSCOPIC URODYNAMIC STUDY N/A 2021    Procedure: URODYNAMIC STUDY, FLUOROSCOPIC;  Surgeon: Viridiana Valenzuela MD;  Location: [facility] OR 1ST FLR;  Service: Urology;  Laterality: N/A;  90 minutes     HYSTERECTOMY      posterolateral fusion with autograft bone and [place] mineralized bone matrix  13    at [facility] for lumbar spine stenosis    TOE SURGERY      TONSILLECTOMY      TRIGGER FINGER RELEASE       Family History      Problem Relation (Age of Onset)    Alcohol abuse Brother, Brother    Arthritis Daughter, Brother    Asthma Daughter    Bladder Cancer Mother    Breast cancer Sister (79)    Cataracts Mother    Colon cancer Father, Brother (70)    Depression Daughter, Daughter    Glaucoma Mother    Heart disease Father (50), Mother    Hyperlipidemia Mother    Hypertension Daughter    Kidney disease Mother    Parkinsonism Brother    Stroke Daughter (40)        Tobacco Use    Smoking status: Never Smoker    Smokeless tobacco: Never Used   Substance and Sexual Activity    Alcohol use: Yes     Comment: socially, hardly ever    Drug use: No    Sexual activity: Never     Birth control/protection: Abstinence     Review of Systems   Constitutional: Positive for fatigue and fever. Negative for activity change, appetite change and chills.   Respiratory: Negative for shortness of breath.    Cardiovascular: Negative for chest pain and palpitations.   Gastrointestinal: Positive for abdominal pain, constipation and nausea. Negative for abdominal distention, diarrhea and vomiting.   Musculoskeletal: Negative for myalgias.   Skin: Negative for wound.   Psychiatric/Behavioral: Negative for behavioral problems and decreased concentration. The patient is nervous/anxious.    All other systems reviewed and are negative.    Objective:     Vital Signs (Most Recent):  Temp: 97.7 °F (36.5 °C) (01/12/22 1545)  Pulse: 62 (01/12/22 1711)  Resp: 18 (01/12/22 1718)  BP: (!) 102/58 (01/12/22 1711)  SpO2: 99 % (01/12/22 1711) Vital Signs (24h Range):  Temp:  [97.7 °F (36.5 °C)-98 °F (36.7 °C)] 97.7 °F (36.5 °C)  Pulse:  [62-74] 62  Resp:  [16-18] 18  SpO2:  [99 %-100 %] 99 %  BP: (102-118)/(56-71) 102/58        There is no height or weight on file to calculate BMI.    Physical Exam  Vitals and nursing note reviewed.   Constitutional:       Appearance: Normal appearance.   HENT:      Head: Normocephalic and atraumatic.   Cardiovascular:      Rate and Rhythm:  Normal rate and regular rhythm.   Pulmonary:      Effort: Pulmonary effort is normal. No respiratory distress.   Abdominal:      General: Abdomen is flat. There is no distension.      Palpations: Abdomen is soft. There is no mass.      Tenderness: There is abdominal tenderness. There is no guarding or rebound.      Hernia: No hernia is present.      Comments: Scars from multiple abdominal surgeries  Mild tenderness to palpation     Skin:     General: Skin is warm and dry.   Neurological:      General: No focal deficit present.      Mental Status: She is alert and oriented to person, place, and time. Mental status is at baseline.   Psychiatric:         Mood and Affect: Mood normal.         Behavior: Behavior normal.         Significant Labs:  CBC:   Recent Labs   Lab 01/12/22  1419 01/12/22  1429   WBC 6.24  --    RBC 3.70*  --    HGB 12.0  --    HCT 37.2 35*   *  --    *  --    MCH 32.4*  --    MCHC 32.3  --      CMP:   Recent Labs   Lab 01/12/22  1419   *   CALCIUM 9.7   ALBUMIN 3.7   PROT 7.2   *   K 4.1   CO2 27      BUN 13   CREATININE 0.8   ALKPHOS 89   ALT 24   AST 26   BILITOT 1.6*       Significant Diagnostics:  I have reviewed all pertinent imaging results/findings within the past 24 hours.

## 2022-01-13 ENCOUNTER — PATIENT MESSAGE (OUTPATIENT)
Dept: GASTROENTEROLOGY | Facility: CLINIC | Age: 77
End: 2022-01-13
Payer: MEDICARE

## 2022-01-13 LAB
ALBUMIN SERPL BCP-MCNC: 3.1 G/DL (ref 3.5–5.2)
ALP SERPL-CCNC: 82 U/L (ref 55–135)
ALT SERPL W/O P-5'-P-CCNC: 24 U/L (ref 10–44)
ANION GAP SERPL CALC-SCNC: 9 MMOL/L (ref 8–16)
AST SERPL-CCNC: 26 U/L (ref 10–40)
BASOPHILS # BLD AUTO: 0.03 K/UL (ref 0–0.2)
BASOPHILS NFR BLD: 0.6 % (ref 0–1.9)
BILIRUB SERPL-MCNC: 1.5 MG/DL (ref 0.1–1)
BUN SERPL-MCNC: 11 MG/DL (ref 8–23)
CALCIUM SERPL-MCNC: 8.8 MG/DL (ref 8.7–10.5)
CHLORIDE SERPL-SCNC: 105 MMOL/L (ref 95–110)
CO2 SERPL-SCNC: 24 MMOL/L (ref 23–29)
CREAT SERPL-MCNC: 0.7 MG/DL (ref 0.5–1.4)
DIFFERENTIAL METHOD: ABNORMAL
EOSINOPHIL # BLD AUTO: 0.3 K/UL (ref 0–0.5)
EOSINOPHIL NFR BLD: 4.9 % (ref 0–8)
ERYTHROCYTE [DISTWIDTH] IN BLOOD BY AUTOMATED COUNT: 13.9 % (ref 11.5–14.5)
EST. GFR  (AFRICAN AMERICAN): >60 ML/MIN/1.73 M^2
EST. GFR  (NON AFRICAN AMERICAN): >60 ML/MIN/1.73 M^2
GLUCOSE SERPL-MCNC: 154 MG/DL (ref 70–110)
HCT VFR BLD AUTO: 32.1 % (ref 37–48.5)
HGB BLD-MCNC: 10.3 G/DL (ref 12–16)
IMM GRANULOCYTES # BLD AUTO: 0.01 K/UL (ref 0–0.04)
IMM GRANULOCYTES NFR BLD AUTO: 0.2 % (ref 0–0.5)
LYMPHOCYTES # BLD AUTO: 1 K/UL (ref 1–4.8)
LYMPHOCYTES NFR BLD: 19.5 % (ref 18–48)
MAGNESIUM SERPL-MCNC: 1.6 MG/DL (ref 1.6–2.6)
MCH RBC QN AUTO: 31.7 PG (ref 27–31)
MCHC RBC AUTO-ENTMCNC: 32.1 G/DL (ref 32–36)
MCV RBC AUTO: 99 FL (ref 82–98)
MONOCYTES # BLD AUTO: 0.6 K/UL (ref 0.3–1)
MONOCYTES NFR BLD: 12.3 % (ref 4–15)
NEUTROPHILS # BLD AUTO: 3.2 K/UL (ref 1.8–7.7)
NEUTROPHILS NFR BLD: 62.5 % (ref 38–73)
NRBC BLD-RTO: 0 /100 WBC
PLATELET # BLD AUTO: 88 K/UL (ref 150–450)
PMV BLD AUTO: 10.6 FL (ref 9.2–12.9)
POCT GLUCOSE: 141 MG/DL (ref 70–110)
POCT GLUCOSE: 171 MG/DL (ref 70–110)
POCT GLUCOSE: 206 MG/DL (ref 70–110)
POCT GLUCOSE: 223 MG/DL (ref 70–110)
POTASSIUM SERPL-SCNC: 3.7 MMOL/L (ref 3.5–5.1)
PROT SERPL-MCNC: 6 G/DL (ref 6–8.4)
RBC # BLD AUTO: 3.25 M/UL (ref 4–5.4)
SODIUM SERPL-SCNC: 138 MMOL/L (ref 136–145)
WBC # BLD AUTO: 5.13 K/UL (ref 3.9–12.7)

## 2022-01-13 PROCEDURE — 25000003 PHARM REV CODE 250: Performed by: NURSE PRACTITIONER

## 2022-01-13 PROCEDURE — 99222 PR INITIAL HOSPITAL CARE,LEVL II: ICD-10-PCS | Mod: ,,, | Performed by: INTERNAL MEDICINE

## 2022-01-13 PROCEDURE — 85025 COMPLETE CBC W/AUTO DIFF WBC: CPT | Performed by: NURSE PRACTITIONER

## 2022-01-13 PROCEDURE — 99233 PR SUBSEQUENT HOSPITAL CARE,LEVL III: ICD-10-PCS | Mod: ,,, | Performed by: PHYSICIAN ASSISTANT

## 2022-01-13 PROCEDURE — 11000001 HC ACUTE MED/SURG PRIVATE ROOM

## 2022-01-13 PROCEDURE — 36415 COLL VENOUS BLD VENIPUNCTURE: CPT | Performed by: NURSE PRACTITIONER

## 2022-01-13 PROCEDURE — 80053 COMPREHEN METABOLIC PANEL: CPT | Performed by: NURSE PRACTITIONER

## 2022-01-13 PROCEDURE — 63600175 PHARM REV CODE 636 W HCPCS: Performed by: NURSE PRACTITIONER

## 2022-01-13 PROCEDURE — 99222 1ST HOSP IP/OBS MODERATE 55: CPT | Mod: ,,, | Performed by: INTERNAL MEDICINE

## 2022-01-13 PROCEDURE — 83735 ASSAY OF MAGNESIUM: CPT | Performed by: NURSE PRACTITIONER

## 2022-01-13 PROCEDURE — 99233 SBSQ HOSP IP/OBS HIGH 50: CPT | Mod: ,,, | Performed by: PHYSICIAN ASSISTANT

## 2022-01-13 RX ADMIN — LEVOTHYROXINE SODIUM 75 MCG: 75 TABLET ORAL at 06:01

## 2022-01-13 RX ADMIN — POLYETHYLENE GLYCOL 3350 17 G: 17 POWDER, FOR SOLUTION ORAL at 09:01

## 2022-01-13 RX ADMIN — MONTELUKAST 10 MG: 10 TABLET, FILM COATED ORAL at 09:01

## 2022-01-13 RX ADMIN — DULOXETINE 30 MG: 30 CAPSULE, DELAYED RELEASE ORAL at 09:01

## 2022-01-13 RX ADMIN — SENNOSIDES AND DOCUSATE SODIUM 1 TABLET: 50; 8.6 TABLET ORAL at 09:01

## 2022-01-13 RX ADMIN — AZELASTINE HYDROCHLORIDE 274 MCG: 137 SPRAY, METERED NASAL at 09:01

## 2022-01-13 RX ADMIN — ACETAMINOPHEN 1000 MG: 500 TABLET ORAL at 09:01

## 2022-01-13 RX ADMIN — ACETAMINOPHEN 1000 MG: 500 TABLET ORAL at 02:01

## 2022-01-13 RX ADMIN — ENOXAPARIN SODIUM 40 MG: 100 INJECTION SUBCUTANEOUS at 05:01

## 2022-01-13 RX ADMIN — CIPROFLOXACIN 400 MG: 2 INJECTION, SOLUTION INTRAVENOUS at 02:01

## 2022-01-13 RX ADMIN — PANTOPRAZOLE SODIUM 20 MG: 20 TABLET, DELAYED RELEASE ORAL at 09:01

## 2022-01-13 RX ADMIN — ACETAMINOPHEN 1000 MG: 500 TABLET ORAL at 06:01

## 2022-01-13 RX ADMIN — ATORVASTATIN CALCIUM 40 MG: 40 TABLET, FILM COATED ORAL at 09:01

## 2022-01-13 RX ADMIN — OXYBUTYNIN CHLORIDE 10 MG: 10 TABLET, EXTENDED RELEASE ORAL at 09:01

## 2022-01-13 RX ADMIN — INSULIN ASPART 1 UNITS: 100 INJECTION, SOLUTION INTRAVENOUS; SUBCUTANEOUS at 09:01

## 2022-01-13 NOTE — ASSESSMENT & PLAN NOTE
76 y/o F with PMH of IBS (diarrhea and constipation), colon cancer s/p resection (2010), multiple SBO s/p ex lap x2 who presents with abdominal pain and worsening chronic diarrhea. Follows with Dr. Parker. CT abdomen pelvis concerning for partial SBO, evaluated by Gen Surg who believe this to be a functional problem and recommended GI consult. Abdominal pain improving and no episodes of diarrhea since admission.    Recommendations:  - supportive treatment  - advance diet as tolerated  - outpatient GI follow up

## 2022-01-13 NOTE — ASSESSMENT & PLAN NOTE
76 yo female with chronic abdominal pain, constipation, h/o multiple abdominal surgeries and admission for suspected bowel obstructions. General surgery evaluated patient in the ED and feel her clinical history this episode and exam is inconsistent with bowel obstruction - this episode is most likely a functional problem. Recs include clear liquid diet, bowel regimen, and GI consult.      - CT abd/pelvis reviewed. It looks very similar to CT from 01/02 on last admission. Fecalization of stool within the small bowel consistent with chronic constipation  - Clear liquid diet, advance as tolerated.  -IVF  -PRN antiemetics  -Scheduled tylenol and careful use of PRN narcotics  -Started on bowel regimen of miralax and senna-docusate  -GI consult, appreciate recs

## 2022-01-13 NOTE — HOSPITAL COURSE
77 y.o. who was admitted to hospital medicine for abdominal discomfort. CT abdomen/pelvis concerning for partial SBO. Patient was evaluated by general surgery who suspect a functional component to her presentation and recommended gastroenterology consults with aggressive bowel regimen. Gastroenterology evaluated and recommended small bowel follow-through which was without obstruction therefore supportive care with outpatient follow-up. Urine culture grew enterococcus, antibiotics adjusted per sensitivities and ID recs. Patient tolerated PO diet without difficulty. Discharged home in stable condition.

## 2022-01-13 NOTE — SUBJECTIVE & OBJECTIVE
Interval History: NAEON. Abdominal pain improving and no episodes of diarrhea since admission. Consultants both recommending supportive care.     Review of Systems   Constitutional: Positive for fatigue and fever. Negative for activity change, appetite change and chills.   Eyes: Negative for photophobia and visual disturbance.   Respiratory: Negative for shortness of breath.    Cardiovascular: Negative for chest pain and palpitations.   Gastrointestinal: Positive for abdominal pain, constipation and nausea. Negative for abdominal distention, diarrhea and vomiting.   Genitourinary: Negative for difficulty urinating and dysuria.   Musculoskeletal: Negative for myalgias.   Skin: Negative for wound.   Psychiatric/Behavioral: Negative for behavioral problems and decreased concentration. The patient is nervous/anxious.    All other systems reviewed and are negative.    Objective:     Vital Signs (Most Recent):  Temp: 98.3 °F (36.8 °C) (01/13/22 1554)  Pulse: (!) 58 (01/13/22 1554)  Resp: 16 (01/13/22 1554)  BP: (!) 109/59 (01/13/22 1554)  SpO2: 98 % (01/13/22 1554) Vital Signs (24h Range):  Temp:  [96.4 °F (35.8 °C)-98.6 °F (37 °C)] 98.3 °F (36.8 °C)  Pulse:  [58-84] 58  Resp:  [16-18] 16  SpO2:  [95 %-99 %] 98 %  BP: ()/(53-68) 109/59     Weight: 58.9 kg (129 lb 12.8 oz)  Body mass index is 23.74 kg/m².    Intake/Output Summary (Last 24 hours) at 1/13/2022 1707  Last data filed at 1/13/2022 0600  Gross per 24 hour   Intake 1160 ml   Output 700 ml   Net 460 ml      Physical Exam  Vitals and nursing note reviewed.   Constitutional:       Appearance: Normal appearance.   HENT:      Head: Normocephalic and atraumatic.   Cardiovascular:      Rate and Rhythm: Normal rate and regular rhythm.   Pulmonary:      Effort: Pulmonary effort is normal. No respiratory distress.   Abdominal:      General: Abdomen is flat. There is no distension.      Palpations: Abdomen is soft. There is no mass.      Tenderness: There is abdominal  tenderness. There is no guarding or rebound.      Hernia: No hernia is present.   Skin:     General: Skin is warm and dry.   Neurological:      General: No focal deficit present.      Mental Status: She is alert and oriented to person, place, and time. Mental status is at baseline.   Psychiatric:         Mood and Affect: Mood normal.         Behavior: Behavior normal.         Significant Labs: All pertinent labs within the past 24 hours have been reviewed.    Significant Imaging: I have reviewed all pertinent imaging results/findings within the past 24 hours.

## 2022-01-13 NOTE — PLAN OF CARE
[facility] - Telemetry Stepdown ([place]-)  Initial Discharge Assessment       Primary Care Provider: Rocio Mc MD    Admission Diagnosis: Partial small bowel obstruction [K56.600]  Abdominal pain [R10.9]  Chest pain [R07.9]    Admission Date: 1/12/2022  Expected Discharge Date: 1/15/2022         Payor: MEDICARE / Plan: MEDICARE PART A & B / Product Type: Government /     Extended Emergency Contact Information  Primary Emergency Contact: Danielle Whitley   [place]  Mobile Phone: [phone]  Relation: Daughter  Secondary Emergency Contact: EvensKimberly  Address: [address] [address] [facility]  Home Phone: [phone]  Relation: Daughter    Discharge Plan A: (P) Home  Discharge Plan B: (P) Home,Home Health      [id number] - [address] AT [facility] & [address] [address]  Phone: [phone] Fax: [phone]    [facility] - [address] [address]  Phone: [phone] Fax: [phone]                 SW completed Discharge Planning Assessment with patient via bedside. Discharge planning booklet given to patient/family and whiteboard updated with LOI and phone #. All questions answered.    Patient reported that family will provide transportation upon discharge.     Patient reported that she lives alone; however, she has sitters that come to the home to assist her during the day and provide transportation for her. Patient reported that she is independent with her ADL's and she uses a cane, walker, and wheelchair depending on the distance she has to walk. Patient reported that she has a shower bend and grab bars in her bathroom. Patient is not on dialysis and she does not go to a Coumadin clinic.    Vianney Zhou LMSW  [facility]  Ext. [phone]

## 2022-01-13 NOTE — ASSESSMENT & PLAN NOTE
Patient is chronically on statin.will continue for now. Monitor clinically. Last LDL was   Lab Results   Component Value Date    LDLCALC 58.2 (L) 08/04/2020

## 2022-01-13 NOTE — SUBJECTIVE & OBJECTIVE
Past Medical History:   Diagnosis Date    Abdominal pain     Allergic rhinitis     Arthritis     Blood platelet disorder     Blood platelet disorder     Blood transfusion     during delivery and     Bowel obstruction     Cervical radiculopathy     followed by dr cloud    Colon cancer     transverse colon; resected; Stage IIA (pT3 pN0 MX)    Diabetes mellitus     Diarrhea     Falls 2020    Family history of breast cancer     Family history of colon cancer     Fatty liver     GERD (gastroesophageal reflux disease)     History of shingles     Hyperlipidemia     Hypothyroidism     Irritable bowel syndrome     Microscopic colitis     treated     Raynaud phenomenon     Raynaud's disease     Type 2 diabetes mellitus        Past Surgical History:   Procedure Laterality Date    APPENDECTOMY      BACK SURGERY      CARPAL TUNNEL RELEASE      bilateral      SECTION      CHOLECYSTECTOMY  1965    COLECTOMY  2011    Transverse colon resection by Dr. Aguirre    COLONOSCOPY N/A 2017    Procedure: COLONOSCOPY;  Surgeon: Manjit Alvarez MD;  Location: [facility] (4TH FLR);  Service: Endoscopy;  Laterality: N/A;    COLONOSCOPY N/A 2019    Procedure: COLONOSCOPY;  Surgeon: Raimro Jeffreson MD;  Location: [facility] (4TH FLR);  Service: Endoscopy;  Laterality: N/A;  PM prep    CYSTOSCOPY N/A 2021    Procedure: CYSTOSCOPY;  Surgeon: Viridiana Valenzuela MD;  [address]  Service: Urology;  Laterality: N/A;    ENDOSCOPIC ULTRASOUND OF UPPER GASTROINTESTINAL TRACT N/A 2018    Procedure: ULTRASOUND, UPPER GI TRACT, ENDOSCOPIC;  Surgeon: Jose Hess MD;  Location: [facility];  Service: Endoscopy;  Laterality: N/A;    ENDOSCOPIC ULTRASOUND OF UPPER GASTROINTESTINAL TRACT N/A 2019    Procedure: ULTRASOUND, UPPER GI TRACT, ENDOSCOPIC;  Surgeon: Jose Hess MD;  Location: [facility] (2ND FLR);  Service: Endoscopy;  Laterality: N/A;     ESOPHAGOGASTRODUODENOSCOPY N/A 11/16/2018    Procedure: EGD (ESOPHAGOGASTRODUODENOSCOPY);  Surgeon: Angelo Reynolds MD;  Location: [facility] ENDO (2ND FLR);  Service: Endoscopy;  Laterality: N/A;    ESOPHAGOGASTRODUODENOSCOPY N/A 6/26/2019    Procedure: EGD (ESOPHAGOGASTRODUODENOSCOPY);  Surgeon: Ramiro Jefferson MD;  Location: [facility] ENDO (4TH FLR);  Service: Endoscopy;  Laterality: N/A;    EYE SURGERY      Cataract Removal    FLUOROSCOPIC URODYNAMIC STUDY N/A 11/9/2021    Procedure: URODYNAMIC STUDY, FLUOROSCOPIC;  Surgeon: Viridiana Valenzuela MD;  Location: [facility] OR 1ST FLR;  Service: Urology;  Laterality: N/A;  90 minutes     HYSTERECTOMY      posterolateral fusion with autograft bone and [place] mineralized bone matrix  2/1/13    at [facility] for lumbar spine stenosis    TOE SURGERY      TONSILLECTOMY      TRIGGER FINGER RELEASE         Review of patient's allergies indicates:   Allergen Reactions    Dilaudid [hydromorphone (bulk)] Other (See Comments)     Oversedating, head burning. Pt prefers to avoid.       Codeine Nausea And Vomiting     Other reaction(s): Itching    Dilaudid [hydromorphone]     Percocet  [oxycodone-acetaminophen]      Other reaction(s): Itching    Sulfa (sulfonamide antibiotics)      Other reaction(s): Nausea  Other reaction(s): Itching     Family History     Problem Relation (Age of Onset)    Alcohol abuse Brother, Brother    Arthritis Daughter, Brother    Asthma Daughter    Bladder Cancer Mother    Breast cancer Sister (79)    Cataracts Mother    Colon cancer Father, Brother (70)    Depression Daughter, Daughter    Glaucoma Mother    Heart disease Father (50), Mother    Hyperlipidemia Mother    Hypertension Daughter    Kidney disease Mother    Parkinsonism Brother    Stroke Daughter (40)        Tobacco Use    Smoking status: Never Smoker    Smokeless tobacco: Never Used   Substance and Sexual Activity    Alcohol use: Yes     Comment: socially, hardly ever    Drug use: No     Sexual activity: Not Currently     Review of Systems   Constitutional: Positive for appetite change. Negative for chills and fever.   HENT: Negative.    Respiratory: Negative.    Cardiovascular: Negative.    Gastrointestinal: Positive for abdominal pain, diarrhea and nausea. Negative for abdominal distention, blood in stool, constipation and vomiting.   Genitourinary: Negative.    Musculoskeletal: Negative.    Neurological: Negative.    Psychiatric/Behavioral: Negative.      Objective:     Vital Signs (Most Recent):  Temp: 98.6 °F (37 °C) (01/13/22 1129)  Pulse: 64 (01/13/22 1129)  Resp: 16 (01/13/22 1129)  BP: 122/68 (01/13/22 1129)  SpO2: 98 % (01/13/22 1129) Vital Signs (24h Range):  Temp:  [96.4 °F (35.8 °C)-98.6 °F (37 °C)] 98.6 °F (37 °C)  Pulse:  [62-84] 64  Resp:  [16-18] 16  SpO2:  [95 %-99 %] 98 %  BP: ()/(53-68) 122/68     Weight: 58.9 kg (129 lb 12.8 oz) (01/12/22 2319)  Body mass index is 23.74 kg/m².      Intake/Output Summary (Last 24 hours) at 1/13/2022 1200  Last data filed at 1/13/2022 0600  Gross per 24 hour   Intake 1160 ml   Output 700 ml   Net 460 ml       Lines/Drains/Airways     Peripheral Intravenous Line                 Peripheral IV - Single Lumen 01/12/22 1421 18 G Right Upper Arm <1 day         Peripheral IV - Single Lumen 01/13/22 1005 20 G;1 3/4 in Left Forearm <1 day                Physical Exam  Constitutional:       General: She is not in acute distress.     Appearance: She is not toxic-appearing.   HENT:      Head: Normocephalic and atraumatic.      Nose: Nose normal.   Eyes:      Extraocular Movements: Extraocular movements intact.      Conjunctiva/sclera: Conjunctivae normal.   Cardiovascular:      Rate and Rhythm: Normal rate and regular rhythm.      Heart sounds: No murmur heard.      Pulmonary:      Effort: Pulmonary effort is normal.      Breath sounds: Normal breath sounds.   Abdominal:      General: Abdomen is flat. Bowel sounds are normal. There is no distension.       Palpations: Abdomen is soft.      Tenderness: There is abdominal tenderness (generalized). There is no guarding.   Musculoskeletal:         General: Normal range of motion.   Neurological:      Mental Status: She is alert and oriented to person, place, and time.   Psychiatric:         Mood and Affect: Mood normal.         Behavior: Behavior normal.         Significant Labs:  CBC:   Recent Labs   Lab 01/12/22  1419 01/12/22  1429 01/13/22  0323   WBC 6.24  --  5.13   HGB 12.0  --  10.3*   HCT 37.2 35* 32.1*   *  --  88*     CMP:   Recent Labs   Lab 01/13/22  0323   *   CALCIUM 8.8   ALBUMIN 3.1*   PROT 6.0      K 3.7   CO2 24      BUN 11   CREATININE 0.7   ALKPHOS 82   ALT 24   AST 26   BILITOT 1.5*       Significant Imaging:  Imaging results within the past 24 hours have been reviewed.

## 2022-01-13 NOTE — SUBJECTIVE & OBJECTIVE
Interval History: Afebrile. CORTES MOYA. Patient notes that she is currently doing well. She notes a formed stool yesterday in the AM. Abdominal CT with contrast done yesterday.    Medications:  Continuous Infusions:  Scheduled Meds:   acetaminophen  1,000 mg Oral Q8H    atorvastatin  40 mg Oral Daily    azelastine  2 spray Nasal BID    ciprofloxacin  400 mg Intravenous Q12H    DULoxetine  30 mg Oral BID    enoxaparin  40 mg Subcutaneous Daily    levothyroxine  75 mcg Oral Before breakfast    montelukast  10 mg Oral Daily    oxybutynin  10 mg Oral Daily    pantoprazole  20 mg Oral Daily    polyethylene glycol  17 g Oral Daily    senna-docusate 8.6-50 mg  1 tablet Oral BID     PRN Meds:butalbital-acetaminophen-caffeine -40 mg, dextrose 50%, dextrose 50%, diphenhydrAMINE, glucagon (human recombinant), glucose, glucose, insulin aspart U-100, melatonin, morphine, naloxone, ondansetron, oxyCODONE, promethazine (PHENERGAN) IVPB, sodium chloride 0.9%     Review of patient's allergies indicates:   Allergen Reactions    Dilaudid [hydromorphone (bulk)] Other (See Comments)     Oversedating, head burning. Pt prefers to avoid.       Codeine Nausea And Vomiting     Other reaction(s): Itching    Dilaudid [hydromorphone]     Percocet  [oxycodone-acetaminophen]      Other reaction(s): Itching    Sulfa (sulfonamide antibiotics)      Other reaction(s): Nausea  Other reaction(s): Itching     Objective:     Vital Signs (Most Recent):  Temp: 98.5 °F (36.9 °C) (01/13/22 0741)  Pulse: 84 (01/13/22 0741)  Resp: 16 (01/13/22 0741)  BP: (!) 121/59 (01/13/22 0741)  SpO2: 98 % (01/13/22 0741) Vital Signs (24h Range):  Temp:  [96.4 °F (35.8 °C)-98.5 °F (36.9 °C)] 98.5 °F (36.9 °C)  Pulse:  [62-84] 84  Resp:  [16-18] 16  SpO2:  [95 %-100 %] 98 %  BP: ()/(53-71) 121/59     Weight: 58.9 kg (129 lb 12.8 oz)  Body mass index is 23.74 kg/m².    Intake/Output - Last 3 Shifts       01/11 0700 01/12 0659 01/12 0700 01/13  0659 01/13 0700  01/14 0659    P.O.  210     I.V. (mL/kg)  750 (12.7)     IV Piggyback  200     Total Intake(mL/kg)  1160 (19.7)     Urine (mL/kg/hr)  700     Emesis/NG output  0     Total Output  700     Net  +460                  Physical Exam  Vitals and nursing note reviewed.   Constitutional:       Appearance: Normal appearance.   HENT:      Head: Normocephalic and atraumatic.   Cardiovascular:      Rate and Rhythm: Normal rate and regular rhythm.   Pulmonary:      Effort: Pulmonary effort is normal. No respiratory distress.   Abdominal:      General: Abdomen is flat. There is no distension.      Palpations: Abdomen is soft. There is no mass.      Tenderness: There is abdominal tenderness. There is no guarding or rebound.      Hernia: No hernia is present.      Comments: Scars from multiple abdominal surgeries  Mild tenderness to palpation     Skin:     General: Skin is warm and dry.   Neurological:      General: No focal deficit present.      Mental Status: She is alert and oriented to person, place, and time. Mental status is at baseline.   Psychiatric:         Mood and Affect: Mood normal.         Behavior: Behavior normal.          Significant Labs:  I have reviewed all pertinent lab results within the past 24 hours.  CBC:   Recent Labs   Lab 01/13/22  0323   WBC 5.13   RBC 3.25*   HGB 10.3*   HCT 32.1*   PLT 88*   MCV 99*   MCH 31.7*   MCHC 32.1     CMP:   Recent Labs   Lab 01/13/22  0323   *   CALCIUM 8.8   ALBUMIN 3.1*   PROT 6.0      K 3.7   CO2 24      BUN 11   CREATININE 0.7   ALKPHOS 82   ALT 24   AST 26   BILITOT 1.5*       Significant Diagnostics:  I have reviewed all pertinent imaging results/findings within the past 24 hours.  CT: I have reviewed all pertinent results/findings within the past 24 hours and my personal findings are:  CT Abdomen Pelvis with Contrast 01/12:      Segment of dilated small bowel with fecalization of the luminal material measuring up to 3.8 cm with  likely transition point in the right lower quadrant as described above.      Findings likely represent partial small bowel obstruction.  Increased soft tissue and close approximation of the small bowel loops to the anterior abdominal wall likely representing adhesions.     Lateralization of the position of the small bowel in regards to the ascending colon potentially representing internal hernia.     Stable mild right hydronephrosis.     Splenomegaly.     Diffusely hypoattenuating liver suggestive of steatosis.  Consider correlation with LFTs.  Stable nonspecific enhancing focus within the right hepatic dome with potentially flash filling hemangioma.     Increased soft tissue about the celiac axis and SMA as well as multiple mesenteric and olga hepatis nodes.

## 2022-01-13 NOTE — SUBJECTIVE & OBJECTIVE
Past Medical History:   Diagnosis Date    Abdominal pain     Allergic rhinitis     Arthritis     Blood platelet disorder     Blood platelet disorder     Blood transfusion     during delivery and     Bowel obstruction     Cervical radiculopathy     followed by dr cloud    Colon cancer     transverse colon; resected; Stage IIA (pT3 pN0 MX)    Diabetes mellitus     Diarrhea     Falls 2020    Family history of breast cancer     Family history of colon cancer     Fatty liver     GERD (gastroesophageal reflux disease)     History of shingles     Hyperlipidemia     Hypothyroidism     Irritable bowel syndrome     Microscopic colitis     treated     Raynaud phenomenon     Raynaud's disease     Type 2 diabetes mellitus        Past Surgical History:   Procedure Laterality Date    APPENDECTOMY      BACK SURGERY      CARPAL TUNNEL RELEASE      bilateral      SECTION      CHOLECYSTECTOMY  1965    COLECTOMY  2011    Transverse colon resection by Dr. Aguirre    COLONOSCOPY N/A 2017    Procedure: COLONOSCOPY;  Surgeon: Manjit Alvarez MD;  Location: [facility] (4TH FLR);  Service: Endoscopy;  Laterality: N/A;    COLONOSCOPY N/A 2019    Procedure: COLONOSCOPY;  Surgeon: Ramiro Jefferson MD;  Location: [facility] (4TH FLR);  Service: Endoscopy;  Laterality: N/A;  PM prep    CYSTOSCOPY N/A 2021    Procedure: CYSTOSCOPY;  Surgeon: Viridiana Valenzuela MD;  [address];  Service: Urology;  Laterality: N/A;    ENDOSCOPIC ULTRASOUND OF UPPER GASTROINTESTINAL TRACT N/A 2018    Procedure: ULTRASOUND, UPPER GI TRACT, ENDOSCOPIC;  Surgeon: Jose Hess MD;  Location: [facility];  Service: Endoscopy;  Laterality: N/A;    ENDOSCOPIC ULTRASOUND OF UPPER GASTROINTESTINAL TRACT N/A 2019    Procedure: ULTRASOUND, UPPER GI TRACT, ENDOSCOPIC;  Surgeon: Jose Hess MD;  Location: [facility] (2ND FLR);  Service: Endoscopy;  Laterality: N/A;     ESOPHAGOGASTRODUODENOSCOPY N/A 2018    Procedure: EGD (ESOPHAGOGASTRODUODENOSCOPY);  Surgeon: Angelo Reynolds MD;  Location: [facility] ENDO (2ND FLR);  Service: Endoscopy;  Laterality: N/A;    ESOPHAGOGASTRODUODENOSCOPY N/A 2019    Procedure: EGD (ESOPHAGOGASTRODUODENOSCOPY);  Surgeon: Ramiro Jefferson MD;  Location: [facility] ENDO (4TH FLR);  Service: Endoscopy;  Laterality: N/A;    EYE SURGERY      Cataract Removal    FLUOROSCOPIC URODYNAMIC STUDY N/A 2021    Procedure: URODYNAMIC STUDY, FLUOROSCOPIC;  Surgeon: Viridiana Valenzuela MD;  Location: [facility] OR 1ST FLR;  Service: Urology;  Laterality: N/A;  90 minutes     HYSTERECTOMY      posterolateral fusion with autograft bone and Eun mineralized bone matrix  13    at [facility] for lumbar spine stenosis    TOE SURGERY      TONSILLECTOMY      TRIGGER FINGER RELEASE         Review of patient's allergies indicates:   Allergen Reactions    Dilaudid [hydromorphone (bulk)] Other (See Comments)     Oversedating, head burning. Pt prefers to avoid.       Codeine Nausea And Vomiting     Other reaction(s): Itching    Dilaudid [hydromorphone]     Percocet  [oxycodone-acetaminophen]      Other reaction(s): Itching    Sulfa (sulfonamide antibiotics)      Other reaction(s): Nausea  Other reaction(s): Itching       No current facility-administered medications on file prior to encounter.     Current Outpatient Medications on File Prior to Encounter   Medication Sig    ascorbic Acid (VITAMIN C) 500 mg CpSR Take 500 mg by mouth once daily.     atorvastatin (LIPITOR) 40 MG tablet TAKE 1 TABLET BY MOUTH  DAILY    azelastine (ASTELIN) 137 mcg (0.1 %) nasal spray 2 sprays (274 mcg total) by Nasal route 2 (two) times daily.    biotin 5,000 mcg TbDL Take 1 tablet by mouth once daily.     butalbital-acetaminophen-caffeine -40 mg (FIORICET, ESGIC) -40 mg per tablet SMARTSI Tablet(s) By Mouth 1 to 3 Times Daily PRN    cholestyramine-aspartame  (CHOLESTYRAMINE LIGHT) 4 gram PwPk Take 1 packet (4 g total) by mouth daily as needed (Diarrhea).    clotrimazole-betamethasone 1-0.05% (LOTRISONE) cream Apply topically 2 (two) times daily.    co-enzyme Q-10 30 mg capsule Take 30 mg by mouth 3 (three) times daily.    conjugated estrogens (PREMARIN) vaginal cream INSERT 1/2 GRAM VAGINALLY  TWICE WEEKLY    cranberry extract 200 mg Cap Take 1 capsule by mouth once daily.     D-MANNOSE ORAL Take 1 tablet by mouth once daily.     DULoxetine (CYMBALTA) 30 MG capsule Take 1 capsule (30 mg total) by mouth 2 (two) times daily.    flash glucose sensor (FREESTYLE EFREN 14 DAY SENSOR) Kit 1 application by Misc.(Non-Drug; Combo Route) route every 14 (fourteen) days. Disp 30 or 90 day refill    FREESTYLE EFREN 10 DAY READER Misc TEST as directed    FREESTYLE LITE STRIPS Strp TEST DAILY AS DIRECTED    hydrocortisone 2.5 % cream Apply topically 2 (two) times daily as needed.    levothyroxine (SYNTHROID) 75 MCG tablet Take 1 tablet (75 mcg total) by mouth before breakfast.    magnesium 30 mg Tab Take 500 capsules by mouth.     metFORMIN (GLUCOPHAGE) 500 MG tablet Take 500 mg by mouth 3 (three) times daily.    mirabegron (MYRBETRIQ) 50 mg Tb24 Take 1 tablet (50 mg total) by mouth once daily.    montelukast (SINGULAIR) 10 mg tablet TAKE 1 TABLET BY MOUTH  DAILY    omega 3-dha-epa-fish oil 1,200 (144-216) mg Cap Take 1 capsule by mouth once daily.     ondansetron (ZOFRAN-ODT) 8 MG TbDL Take 1 tablet (8 mg total) by mouth every 8 (eight) hours as needed (nausea).    pantoprazole (PROTONIX) 20 MG tablet Take 1 tablet (20 mg total) by mouth once daily.    polyethylene glycol (GLYCOLAX) 17 gram/dose powder Take 17 g by mouth before dinner.    promethazine-codeine 6.25-10 mg/5 ml (PHENERGAN WITH CODEINE) 6.25-10 mg/5 mL syrup Take 5 mLs by mouth every 4 to 6 hours as needed for Cough.    vitamin D 1000 units Tab Take 185 mg by mouth once daily. D3     Family History      Problem Relation (Age of Onset)    Alcohol abuse Brother, Brother    Arthritis Daughter, Brother    Asthma Daughter    Bladder Cancer Mother    Breast cancer Sister (79)    Cataracts Mother    Colon cancer Father, Brother (70)    Depression Daughter, Daughter    Glaucoma Mother    Heart disease Father (50), Mother    Hyperlipidemia Mother    Hypertension Daughter    Kidney disease Mother    Parkinsonism Brother    Stroke Daughter (40)        Tobacco Use    Smoking status: Never Smoker    Smokeless tobacco: Never Used   Substance and Sexual Activity    Alcohol use: Yes     Comment: socially, hardly ever    Drug use: No    Sexual activity: Never     Birth control/protection: Abstinence     Review of Systems   Constitutional: Positive for appetite change (decreased), chills, fatigue and fever (99.1). Negative for diaphoresis.   HENT: Negative for congestion, rhinorrhea, sneezing and sore throat.    Eyes: Negative for photophobia and visual disturbance.   Respiratory: Negative for cough, shortness of breath and wheezing.    Cardiovascular: Negative for chest pain, palpitations and leg swelling.   Gastrointestinal: Positive for abdominal pain, constipation and nausea. Negative for abdominal distention, diarrhea and vomiting.   Genitourinary: Negative for difficulty urinating, dysuria, frequency, hematuria and urgency.   Musculoskeletal: Negative for arthralgias, back pain, myalgias and neck pain.   Skin: Negative for color change, pallor, rash and wound.   Neurological: Negative for dizziness, syncope, speech difficulty, light-headedness and headaches.   Psychiatric/Behavioral: Negative for agitation, confusion and hallucinations. The patient is not nervous/anxious.      Objective:     Vital Signs (Most Recent):  Temp: 97.7 °F (36.5 °C) (01/12/22 1545)  Pulse: 70 (01/12/22 1955)  Resp: 16 (01/12/22 1955)  BP: (!) 97/53 (01/12/22 1955)  SpO2: 98 % (01/12/22 1955) Vital Signs (24h Range):  Temp:  [97.7 °F (36.5  °C)-98 °F (36.7 °C)] 97.7 °F (36.5 °C)  Pulse:  [62-74] 70  Resp:  [16-18] 16  SpO2:  [98 %-100 %] 98 %  BP: ()/(53-71) 97/53        There is no height or weight on file to calculate BMI.    Physical Exam  Vitals reviewed.   Constitutional:       General: She is not in acute distress.     Appearance: She is normal weight. She is not toxic-appearing or diaphoretic.   HENT:      Head: Normocephalic and atraumatic.      Nose: Nose normal.      Mouth/Throat:      Mouth: Mucous membranes are moist.   Eyes:      Pupils: Pupils are equal, round, and reactive to light.   Cardiovascular:      Rate and Rhythm: Normal rate and regular rhythm.      Heart sounds: No murmur heard.      Pulmonary:      Effort: Pulmonary effort is normal. No respiratory distress.      Breath sounds: No wheezing, rhonchi or rales.   Abdominal:      General: Bowel sounds are normal. There is no distension.      Palpations: Abdomen is soft.      Tenderness: There is generalized abdominal tenderness (mild TTP). There is no guarding.   Genitourinary:     Comments: deferred  Musculoskeletal:         General: No swelling, tenderness or deformity. Normal range of motion.      Cervical back: Normal range of motion. No tenderness.   Skin:     General: Skin is warm and dry.      Capillary Refill: Capillary refill takes less than 2 seconds.   Neurological:      General: No focal deficit present.      Mental Status: She is alert and oriented to person, place, and time.      Motor: No weakness.   Psychiatric:         Mood and Affect: Mood normal.         Behavior: Behavior normal.           CRANIAL NERVES     CN III, IV, VI   Pupils are equal, round, and reactive to light.       Significant Labs:   All pertinent labs within the past 24 hours have been reviewed.  CBC:   Recent Labs   Lab 01/12/22  1419 01/12/22  1429   WBC 6.24  --    HGB 12.0  --    HCT 37.2 35*   *  --      CMP:   Recent Labs   Lab 01/12/22  1419   *   K 4.1      CO2 27    *   BUN 13   CREATININE 0.8   CALCIUM 9.7   PROT 7.2   ALBUMIN 3.7   BILITOT 1.6*   ALKPHOS 89   AST 26   ALT 24   ANIONGAP 6*   EGFRNONAA >60.0     Lipase:   Recent Labs   Lab 01/12/22  1419   LIPASE 14     Urine Studies:   Recent Labs   Lab 01/12/22  1428   COLORU Yellow   APPEARANCEUA Hazy*   PHUR 8.0   SPECGRAV 1.010   PROTEINUA Negative   GLUCUA Negative   KETONESU Negative   BILIRUBINUA Negative   OCCULTUA Negative   NITRITE Negative   LEUKOCYTESUR 3+*   RBCUA 6*   WBCUA >100*   BACTERIA Rare   SQUAMEPITHEL 0       Significant Imaging: I have reviewed all pertinent imaging results/findings within the past 24 hours.     Imaging Results           CT Abdomen Pelvis With Contrast (Final result)  Result time 01/12/22 16:09:40    Final result by Guerrero Busch Jr., MD (01/12/22 16:09:40)                 Impression:      Segment of dilated small bowel with fecalization of the luminal material measuring up to 3.8 cm with likely transition point in the right lower quadrant as described above.  Findings likely represent partial small bowel obstruction.  Increased soft tissue and close approximation of the small bowel loops to the anterior abdominal wall likely representing adhesions.    Lateralization of the position of the small bowel in regards to the ascending colon potentially representing internal hernia.    Stable mild right hydronephrosis.    Splenomegaly.    Diffusely hypoattenuating liver suggestive of steatosis.  Consider correlation with LFTs.  Stable nonspecific enhancing focus within the right hepatic dome with potentially flash filling hemangioma.    Increased soft tissue about the celiac axis and SMA as well as multiple mesenteric and olga hepatis nodes.    Additional findings as above.    This report was flagged in Epic as abnormal.    Electronically signed by resident: Primo Stoddard  Date:    01/12/2022  Time:    15:23    Electronically signed by: Guerrero Busch  MD  Date:    01/12/2022  Time:    16:09             Narrative:    EXAMINATION:  CT ABDOMEN PELVIS WITH CONTRAST    CLINICAL HISTORY:  Bowel obstruction suspected;Abdominal pain, acute, nonlocalized;    TECHNIQUE:  The patient was surveyed from the lung bases through the pelvis after the administration of 75 cc Omni 350 IV contrast.  Oral contrast was not administered..  The data was reconstructed for coronal, sagittal, and axial images.    COMPARISON:  CT 01/02/2021, 10/15/2021, 05/14/2021    FINDINGS:  CHEST:    Lungs/Pleura: Mild bibasilar atelectasis.  No concerning nodules.  No focal consolidation, pneumothorax, or pleural fluid.    Heart: The visualized portions of the heart are normal. No pericardial effusion.    Thoracic soft tissues: Unremarkable    ABDOMEN:    Liver: Liver appears normal in size.  Liver appears diffusely hypoattenuating suggesting hepatic steatosis.  Indeterminate arterial enhancing foci in the right hepatic dome and another possible small enhancing focus within the left hepatic lobe (series 2, image 43), potentially representing flash filling hemangiomas.    Gallbladder/Bile ducts: Gallbladder is surgically absent.  Common bile duct is within normal limits status post cholecystectomy.    Spleen:Mild splenomegaly.  Accessory splenule noted.    Stomach: Small hiatal hernia.    Pancreas: Fatty atrophy of the pancreas.  Subcentimeter hypodensity in the pancreatic body (series 2, image 46) potentially representing interspersed fat within the pancreatic parenchyma, side branch IPMN, pseudocyst, or other cystic neoplasm.    Adrenals: Unremarkable.    Renal/Ureters: The kidneys are normal in size and location. Mild right hydronephrosis similar to prior exam.  No left hydronephrosis.  No solid enhancing masses or nephrolithiasis.  Punctate hypodensities in the right midpole and upper pole too small to characterize but likely cyst.  The ureters are normal in course and caliber. The urinary bladder  is unremarkable.    Reproductive: Uterus is absent.    Bowel: Postsurgical changes of partial small bowel resection.  Segment of dilated small bowel along the midline which demonstrates dilation up to 3.8 cm with fecalization of luminal material.  Transition point can be seen on series 2, image 106 and coronal images series 601, image 99.  Collapsed bowel distally and proximally to the dilated portions of the bowel.  These bowel loops closely approximates the ventral abdominal wall with greater than expected soft tissue stranding postsurgical along the ventral abdominal wall, likely adhesions.  Scattered segments of small bowel wall thickening within the right lower quadrant likely due to nondistention.  Stable stranding along the ascending colon and right lower quadrant, potentially postsurgical.  No evidence of portal venous gas or pneumatosis at this time.  Ascending colon appears decompressed compared to the prior exam.  There is loops of small bowel lateral to the ascending colon suggesting internal hernia or postop change.    Peritoneum: no ascites, free fluid, or intraperitoneal free air.    Lymph Nodes: Multiple prominent mesenteric lymph nodes within the midline/right lower quadrant without definite pathologic enlargement, similar to prior.  Multiple nodes about the olga hepatis.  Increased soft tissue about the celiac and SMA also appears similar.    Aorta: The abdominal aorta is normal in course and caliber.  Mild-to-moderate atherosclerotic calcifications.    Bones: Grade 2 anterolisthesis of L5 on S1 and grade 1 anterolisthesis of L4 on L5.  There is partial degenerative fusion of L5-S1.  Bilateral L5 pars defects.  Extensive degenerative change of the spine.  No acute fractures or osseous destructive lesions.    Soft Tissues: Ventral abdominal wall scarring.

## 2022-01-13 NOTE — ASSESSMENT & PLAN NOTE
-UA reviewed, follow cx.   -Started on rocephin in the ED, changed to ciprofloxacin based on previous urine cxs    Component 5 mo ago   Urine Culture, Routine  Abnormal   CITROBACTER FREUNDII   10,000 - 49,999 cfu/ml   No other significant isolate     Resulting Agency [facility]          Susceptibility     Citrobacter freundii     CULTURE, URINE     Amox/K Clav'ate >16/8 mcg/mL Resistant     Amp/Sulbactam >16/8 mcg/mL Resistant     Cefazolin >16 mcg/mL Resistant     Cefepime <=2 mcg/mL Sensitive     Ceftriaxone 32 mcg/mL Resistant     Ciprofloxacin <=1 mcg/mL Sensitive     Ertapenem <=0.5 mcg/mL Sensitive     Gentamicin <=4 mcg/mL Sensitive     Levofloxacin <=2 mcg/mL Sensitive     Meropenem <=1 mcg/mL Sensitive     Nitrofurantoin <=32 mcg/mL Sensitive     Piperacillin/Tazo <=16 mcg/mL Sensitive     Tetracycline <=4 mcg/mL Sensitive     Tobramycin <=4 mcg/mL Sensitive     Trimeth/Sulfa <=2/38 mcg/mL Sensitive

## 2022-01-13 NOTE — PROGRESS NOTES
"[facility] - Telemetry Stepdown ([place] [address])  [facility]  Progress Note    Patient Name: Anayeli Judd  MRN: [id number]  Patient Class: IP- Inpatient   Admission Date: 1/12/2022  Length of Stay: 0 days  Attending Physician: Salima Joseph MD  Primary Care Provider: Rocio Mc MD        Subjective:     Principal Problem:Generalized abdominal pain        HPI:  Anayeli Judd is a 77 y.o. female with a PMHx of HLD, hypothyroidism, GERD, IBS, DM2, urinary incontinence, colon cancer s/p resection (2010), and multiple SBO s/p ex lap x2 who presents to the ED with complaints of abdominal pain and nausea. The patient reports severe generalized abdominal pain beginning last night and worsening this morning. Described as cramping pain that comes in waves. Denies any aggravating or alleviating factors. The patient does endorse fatigue, decrease appetite, chills and fever Tmax 99.1. Denies any vomiting. The patient notes she has chronic issues with constipation but states her BMs were more regular the last few days. Reports her last BM was this morning and was soft but "formed." Of note the patient was recently admitted at [facility] [place] under general surgery for abdominal pain and partial SBO from 1/1-1/4. Her symptoms at that time were attributed to constipation. The patient denies any chest pain, shortness of breath, cough, diarrhea, dysuria, urinary frequency, lightheadedness, or dizziness.    In the ED, VSSAF. CBC with thrombocytopenia which is similar to previous labs. Tbili 1.6, appears elevated at baseline. AST/ALT and alk phos WNL. Lipase WNL. Lactate WNL. UA with 3+ leukocytes, >100 WBCs, and rare bacteria. The patient received dicyclomine, morphine, zofran, and rocephin while in the ED. The ED provider consulted general surgery who evaluated the patient and feel no surgical intervention is needed at this time.      Overview/Hospital Course:  77 y.o. who was admitted to hospital medicine for abdominal " discomfort. CT abdomen/pelvis concerning for partial SBO. Patient was evaluated by general surgery who suspect a functional component to her presentation and recommended gastroenterology consults with aggressive bowel regimen. Gastroenterology evaluated and recommended supportive care with outpatient follow-up. Patient to discharge home with follow-up when medically stable.       Interval History: NAEON. Abdominal pain improving and no episodes of diarrhea since admission. Consultants both recommending supportive care.     Review of Systems   Constitutional: Positive for fatigue and fever. Negative for activity change, appetite change and chills.   Eyes: Negative for photophobia and visual disturbance.   Respiratory: Negative for shortness of breath.    Cardiovascular: Negative for chest pain and palpitations.   Gastrointestinal: Positive for abdominal pain, constipation and nausea. Negative for abdominal distention, diarrhea and vomiting.   Genitourinary: Negative for difficulty urinating and dysuria.   Musculoskeletal: Negative for myalgias.   Skin: Negative for wound.   Psychiatric/Behavioral: Negative for behavioral problems and decreased concentration. The patient is nervous/anxious.    All other systems reviewed and are negative.    Objective:     Vital Signs (Most Recent):  Temp: 98.3 °F (36.8 °C) (01/13/22 1554)  Pulse: (!) 58 (01/13/22 1554)  Resp: 16 (01/13/22 1554)  BP: (!) 109/59 (01/13/22 1554)  SpO2: 98 % (01/13/22 1554) Vital Signs (24h Range):  Temp:  [96.4 °F (35.8 °C)-98.6 °F (37 °C)] 98.3 °F (36.8 °C)  Pulse:  [58-84] 58  Resp:  [16-18] 16  SpO2:  [95 %-99 %] 98 %  BP: ()/(53-68) 109/59     Weight: 58.9 kg (129 lb 12.8 oz)  Body mass index is 23.74 kg/m².    Intake/Output Summary (Last 24 hours) at 1/13/2022 1707  Last data filed at 1/13/2022 0600  Gross per 24 hour   Intake 1160 ml   Output 700 ml   Net 460 ml      Physical Exam  Vitals and nursing note reviewed.   Constitutional:        Appearance: Normal appearance.   HENT:      Head: Normocephalic and atraumatic.   Cardiovascular:      Rate and Rhythm: Normal rate and regular rhythm.   Pulmonary:      Effort: Pulmonary effort is normal. No respiratory distress.   Abdominal:      General: Abdomen is flat. There is no distension.      Palpations: Abdomen is soft. There is no mass.      Tenderness: There is abdominal tenderness. There is no guarding or rebound.      Hernia: No hernia is present.   Skin:     General: Skin is warm and dry.   Neurological:      General: No focal deficit present.      Mental Status: She is alert and oriented to person, place, and time. Mental status is at baseline.   Psychiatric:         Mood and Affect: Mood normal.         Behavior: Behavior normal.         Significant Labs: All pertinent labs within the past 24 hours have been reviewed.    Significant Imaging: I have reviewed all pertinent imaging results/findings within the past 24 hours.      Assessment/Plan:      * Generalized abdominal pain  76 yo female with chronic abdominal pain, constipation, h/o multiple abdominal surgeries and admission for suspected bowel obstructions. General surgery evaluated patient in the ED and feel her clinical history this episode and exam is inconsistent with bowel obstruction - this episode is most likely a functional problem. Recs include clear liquid diet, bowel regimen, and GI consult.      - CT abd/pelvis reviewed. It looks very similar to CT from 01/02 on last admission. Fecalization of stool within the small bowel consistent with chronic constipation  - Clear liquid diet, advance as tolerated.  -IVF  -PRN antiemetics  -Scheduled tylenol and careful use of PRN narcotics  -Started on bowel regimen of miralax and senna-docusate  -GI consult, appreciate recs    Other hyperlipidemia   Patient is chronically on statin.will continue for now. Monitor clinically. Last LDL was   Lab Results   Component Value Date    LDLCALC 58.2 (L)  08/04/2020       Constipation  Chronic issue.  -Miralax daily and senna-colace bid    Acute cystitis without hematuria  -UA reviewed, follow cx.   -Started on rocephin in the ED, changed to ciprofloxacin based on previous urine cxs    Component 5 mo ago   Urine Culture, Routine  Abnormal   CITROBACTER FREUNDII   10,000 - 49,999 cfu/ml   No other significant isolate     Resulting Agency [facility]          Susceptibility     Citrobacter freundii     CULTURE, URINE     Amox/K Clav'ate >16/8 mcg/mL Resistant     Amp/Sulbactam >16/8 mcg/mL Resistant     Cefazolin >16 mcg/mL Resistant     Cefepime <=2 mcg/mL Sensitive     Ceftriaxone 32 mcg/mL Resistant     Ciprofloxacin <=1 mcg/mL Sensitive     Ertapenem <=0.5 mcg/mL Sensitive     Gentamicin <=4 mcg/mL Sensitive     Levofloxacin <=2 mcg/mL Sensitive     Meropenem <=1 mcg/mL Sensitive     Nitrofurantoin <=32 mcg/mL Sensitive     Piperacillin/Tazo <=16 mcg/mL Sensitive     Tetracycline <=4 mcg/mL Sensitive     Tobramycin <=4 mcg/mL Sensitive     Trimeth/Sulfa <=2/38 mcg/mL Sensitive                         Mixed stress and urge urinary incontinence  - home mirabegron not on formulary, oxybutynin for now    Type 2 diabetes mellitus without complication, without long-term current use of insulin  Patient's FSGs are controlled on current hypoglycemics.   Last A1c reviewed-   Lab Results   Component Value Date    HGBA1C 6.7 (H) 10/16/2021     Most recent fingerstick glucose reviewed- No results for input(s): POCTGLUCOSE in the last 24 hours.  Current correctional scale  Low  Maintain anti-hyperglycemic dose as follows-   Antihyperglycemics (From admission, onward)            Start     Stop Route Frequency Ordered    01/12/22 2059  insulin aspart U-100 pen 0-5 Units         -- SubQ Before meals & nightly PRN 01/12/22 1959      Accuchecks AC/HS    Gait instability  -Fall precautions    Partial small bowel obstruction  See generalized abd pain above    GERD (gastroesophageal reflux  disease)  -Continue PPI    Hypothyroid  Patient has chronic hypothyroidism. TFTs reviewed-   Lab Results   Component Value Date    TSH 2.432 07/06/2021   . Will continue chronic levothyroxine and adjust for and clinical changes.      VTE Risk Mitigation (From admission, onward)         Ordered     enoxaparin injection 40 mg  Daily         01/12/22 1959     IP VTE HIGH RISK PATIENT  Once         01/12/22 1959     Place sequential compression device  Until discontinued         01/12/22 1959                Discharge Planning   LOI: 1/15/2022     Code Status: Full Code   Is the patient medically ready for discharge?: No    Reason for patient still in hospital (select all that apply): Treatment and Consult recommendations  Discharge Plan A: Home                  Moisés Vela PA-C  Department of Hospital Medicine   [facility] - Telemetry Stepdown ([place])

## 2022-01-13 NOTE — PROGRESS NOTES
[facility] - Telemetry Stepdown ([place] [id number])  General Surgery  Progress Note    Subjective:     History of Present Illness:  Anayeli Judd is a 76 y.o. female with hx of GERD, HLD, hypothyroidism, DM II, IBS, transverse colon cancer s/p resection (2010), multiple SBO s/p ex lap x2, SBR, urinary incontinence who presents to the ED with abdominal pain and nausea. She has been worked up for chronic abdominal pain and diarrhea and has been admitted multiple times.      Her SBO history includes ex lap with Dr. Silvestre in 2014 - had SBRx2 and enterolysis after failed conservative management. Then again in 2017, underwent ex lap with Dr. Ward. Had small bowel repair x 14 and placement of G tube. She had dense adhesions that required lysis for >2 hours.     She presents today with several days of generalized, crampy abdominal pain that she feels comes in waves and has been worsening. She has continued to have bowel movements, last BM this am. She had one instance of nausea without vomiting this am. She reports no other symptoms.          Post-Op Info:  * No surgery found *         Interval History: Afebrile. VSS. NAEON. Patient notes that she is currently doing well. She notes a formed stool yesterday in the AM. Abdominal CT with contrast done yesterday.    Medications:  Continuous Infusions:  Scheduled Meds:   acetaminophen  1,000 mg Oral Q8H    atorvastatin  40 mg Oral Daily    azelastine  2 spray Nasal BID    ciprofloxacin  400 mg Intravenous Q12H    DULoxetine  30 mg Oral BID    enoxaparin  40 mg Subcutaneous Daily    levothyroxine  75 mcg Oral Before breakfast    montelukast  10 mg Oral Daily    oxybutynin  10 mg Oral Daily    pantoprazole  20 mg Oral Daily    polyethylene glycol  17 g Oral Daily    senna-docusate 8.6-50 mg  1 tablet Oral BID     PRN Meds:butalbital-acetaminophen-caffeine -40 mg, dextrose 50%, dextrose 50%, diphenhydrAMINE, glucagon (human recombinant), glucose, glucose,  insulin aspart U-100, melatonin, morphine, naloxone, ondansetron, oxyCODONE, promethazine (PHENERGAN) IVPB, sodium chloride 0.9%     Review of patient's allergies indicates:   Allergen Reactions    Dilaudid [hydromorphone (bulk)] Other (See Comments)     Oversedating, head burning. Pt prefers to avoid.       Codeine Nausea And Vomiting     Other reaction(s): Itching    Dilaudid [hydromorphone]     Percocet  [oxycodone-acetaminophen]      Other reaction(s): Itching    Sulfa (sulfonamide antibiotics)      Other reaction(s): Nausea  Other reaction(s): Itching     Objective:     Vital Signs (Most Recent):  Temp: 98.5 °F (36.9 °C) (01/13/22 0741)  Pulse: 84 (01/13/22 0741)  Resp: 16 (01/13/22 0741)  BP: (!) 121/59 (01/13/22 0741)  SpO2: 98 % (01/13/22 0741) Vital Signs (24h Range):  Temp:  [96.4 °F (35.8 °C)-98.5 °F (36.9 °C)] 98.5 °F (36.9 °C)  Pulse:  [62-84] 84  Resp:  [16-18] 16  SpO2:  [95 %-100 %] 98 %  BP: ()/(53-71) 121/59     Weight: 58.9 kg (129 lb 12.8 oz)  Body mass index is 23.74 kg/m².    Intake/Output - Last 3 Shifts       01/11 0700 01/12 0659 01/12 0700 01/13 0659 01/13 0700 01/14 0659    P.O.  210     I.V. (mL/kg)  750 (12.7)     IV Piggyback  200     Total Intake(mL/kg)  1160 (19.7)     Urine (mL/kg/hr)  700     Emesis/NG output  0     Total Output  700     Net  +460                  Physical Exam  Vitals and nursing note reviewed.   Constitutional:       Appearance: Normal appearance.   HENT:      Head: Normocephalic and atraumatic.   Cardiovascular:      Rate and Rhythm: Normal rate and regular rhythm.   Pulmonary:      Effort: Pulmonary effort is normal. No respiratory distress.   Abdominal:      General: Abdomen is flat. There is no distension.      Palpations: Abdomen is soft. There is no mass.      Tenderness: There is abdominal tenderness. There is no guarding or rebound.      Hernia: No hernia is present.      Comments: Scars from multiple abdominal surgeries  Mild tenderness to  palpation     Skin:     General: Skin is warm and dry.   Neurological:      General: No focal deficit present.      Mental Status: She is alert and oriented to person, place, and time. Mental status is at baseline.   Psychiatric:         Mood and Affect: Mood normal.         Behavior: Behavior normal.          Significant Labs:  I have reviewed all pertinent lab results within the past 24 hours.  CBC:   Recent Labs   Lab 01/13/22  0323   WBC 5.13   RBC 3.25*   HGB 10.3*   HCT 32.1*   PLT 88*   MCV 99*   MCH 31.7*   MCHC 32.1     CMP:   Recent Labs   Lab 01/13/22  0323   *   CALCIUM 8.8   ALBUMIN 3.1*   PROT 6.0      K 3.7   CO2 24      BUN 11   CREATININE 0.7   ALKPHOS 82   ALT 24   AST 26   BILITOT 1.5*       Significant Diagnostics:  I have reviewed all pertinent imaging results/findings within the past 24 hours.  CT: I have reviewed all pertinent results/findings within the past 24 hours and my personal findings are:  CT Abdomen Pelvis with Contrast 01/12:      Segment of dilated small bowel with fecalization of the luminal material measuring up to 3.8 cm with likely transition point in the right lower quadrant as described above.      Findings likely represent partial small bowel obstruction.  Increased soft tissue and close approximation of the small bowel loops to the anterior abdominal wall likely representing adhesions.     Lateralization of the position of the small bowel in regards to the ascending colon potentially representing internal hernia.     Stable mild right hydronephrosis.     Splenomegaly.     Diffusely hypoattenuating liver suggestive of steatosis.  Consider correlation with LFTs.  Stable nonspecific enhancing focus within the right hepatic dome with potentially flash filling hemangioma.     Increased soft tissue about the celiac axis and SMA as well as multiple mesenteric and olga hepatis nodes.     Assessment/Plan:     * Generalized abdominal pain  78 yo female with  chronic abdominal pain, constipation, h/o multiple abdominal surgeries and admission for suspected bowel obstructions. Her clinical history this episode and exam is inconsistent with bowel obstruction - this episode is most likely a functional problem.   CT abd/pelvis reviewed. It looks very similar to CT from 01/02 on last admission. Fecalization of stool within the small bowel consistent with chronic constipation  Vascular inflow looks good, ischemic colitis unlikely.    - No acute surgical intervention warranted   - Agree with bowel regiment   - Would consider inpatient GI consult for optimization of bowel regimen, GI motility, and dietary modifications that may assist in her symptomatology.         Luigi Price MD  General Surgery  [facility] - Tele[facility] Stepdown ([place]

## 2022-01-13 NOTE — NURSING TRANSFER
Nursing Transfer Note    Pt arrived from the er via stretcher accompanied by transporter.aaox4.resp even and non labored.vss.c/o abd pain upon arrival.ivf in progress at 75ml/hr.safety precautions implemented.will monitor.

## 2022-01-13 NOTE — ASSESSMENT & PLAN NOTE
78 yo female with chronic abdominal pain, constipation, h/o multiple abdominal surgeries and admission for suspected bowel obstructions. General surgery evaluated patient in the ED and feel her clinical history this episode and exam is inconsistent with bowel obstruction - this episode is most likely a functional problem. Recs include clear liquid diet, bowel regimen, and GI consult.      - CT abd/pelvis reviewed. It looks very similar to CT from 01/02 on last admission. Fecalization of stool within the small bowel consistent with chronic constipation  - Clear liquid diet, advance as tolerated.  -IVF  -PRN antiemetics  -Scheduled tylenol and careful use of PRN narcotics  -Started on bowel regimen of miralax and senna-docusate  -GI consult, appreciate recs

## 2022-01-13 NOTE — ASSESSMENT & PLAN NOTE
Patient's FSGs are controlled on current hypoglycemics.   Last A1c reviewed-   Lab Results   Component Value Date    HGBA1C 6.7 (H) 10/16/2021     Most recent fingerstick glucose reviewed- No results for input(s): POCTGLUCOSE in the last 24 hours.  Current correctional scale  Low  Maintain anti-hyperglycemic dose as follows-   Antihyperglycemics (From admission, onward)            Start     Stop Route Frequency Ordered    01/12/22 2059  insulin aspart U-100 pen 0-5 Units         -- SubQ Before meals & nightly PRN 01/12/22 1959      Accuchecks AC/HS

## 2022-01-13 NOTE — ASSESSMENT & PLAN NOTE
78 yo female with chronic abdominal pain, constipation, h/o multiple abdominal surgeries and admission for suspected bowel obstructions. Her clinical history this episode and exam is inconsistent with bowel obstruction - this episode is most likely a functional problem.   CT abd/pelvis reviewed. It looks very similar to CT from 01/02 on last admission. Fecalization of stool within the small bowel consistent with chronic constipation  Vascular inflow looks good, ischemic colitis unlikely.    - No acute surgical intervention warranted   - Agree with bowel regiment   - Would consider inpatient GI consult for optimization of bowel regimen, GI motility, and dietary modifications that may assist in her symptomatology.

## 2022-01-13 NOTE — H&P
"[facility] ([place] [address])  [facility]  History & Physical    Patient Name: Anayeli Judd  MRN: [id number]  Patient Class: OP- Observation  Admission Date: 1/12/2022  Attending Physician: Sami Dougherty MD   Primary Care Provider: Rocio Mc MD         Patient information was obtained from patient, past medical records and ER records.     Subjective:     Principal Problem:Generalized abdominal pain    Chief Complaint:   Chief Complaint   Patient presents with    Abdominal Pain        HPI: Anayeli Judd is a 77 y.o. female with a PMHx of HLD, hypothyroidism, GERD, IBS, DM2, urinary incontinence, colon cancer s/p resection (2010), and multiple SBO s/p ex lap x2 who presents to the ED with complaints of abdominal pain and nausea. The patient reports severe generalized abdominal pain beginning last night and worsening this morning. Described as cramping pain that comes in waves. Denies any aggravating or alleviating factors. The patient does endorse fatigue, decrease appetite, chills and fever Tmax 99.1. Denies any vomiting. The patient notes she has chronic issues with constipation but states her BMs were more regular the last few days. Reports her last BM was this morning and was soft but "formed." Of note the patient [place] [facility] [place] for abdominal pain and partial SBO from 1/1-1/4. Her symptoms at that time were attributed to constipation. The patient denies any chest pain, shortness of breath, cough, diarrhea, dysuria, urinary frequency, lightheadedness, or dizziness.    In the ED, VSSAF. CBC with thrombocytopenia which is similar to previous labs. Tbili 1.6, appears elevated at baseline. AST/ALT and alk phos WNL. Lipase WNL. Lactate WNL. UA with 3+ leukocytes, >100 WBCs, and rare bacteria. The patient received dicyclomine, morphine, zofran, and rocephin while in the ED. The ED provider consulted general surgery who evaluated the patient " and feel no surgical intervention is needed at this time.      Past Medical History:   Diagnosis Date    Abdominal pain     Allergic rhinitis     Arthritis     Blood platelet disorder     Blood platelet disorder     Blood transfusion     during delivery and     Bowel obstruction     Cervical radiculopathy     followed by dr cloud    Colon cancer     transverse colon; resected; Stage IIA (pT3 pN0 MX)    Diabetes mellitus     Diarrhea     Falls 2020    Family history of breast cancer     Family history of colon cancer     Fatty liver     GERD (gastroesophageal reflux disease)     History of shingles     Hyperlipidemia     Hypothyroidism     Irritable bowel syndrome     Microscopic colitis     treated     Raynaud phenomenon     Raynaud's disease     Type 2 diabetes mellitus        Past Surgical History:   Procedure Laterality Date    APPENDECTOMY      BACK SURGERY      CARPAL TUNNEL RELEASE      bilateral      SECTION      CHOLECYSTECTOMY  1965    COLECTOMY  2011    Transverse colon resection by Dr. Aguirre    COLONOSCOPY N/A 2017    Procedure: COLONOSCOPY;  Surgeon: Manjit Alvarez MD;  [facility]  Service: Endoscopy;  Laterality: N/A;    COLONOSCOPY N/A 2019    Procedure: COLONOSCOPY;  Surgeon: Ramiro Jefferson MD;  Location: [facility] ([address] [place] [address]);  Service: Endoscopy;  Laterality: N/A;  PM prep    CYSTOSCOPY N/A 2021    Procedure: CYSTOSCOPY;  Surgeon: Viridiana Valenzuela MD;  [address]  Service: Urology;  Laterality: N/A;    ENDOSCOPIC ULTRASOUND OF UPPER GASTROINTESTINAL TRACT N/A 2018    Procedure: ULTRASOUND, UPPER GI TRACT, ENDOSCOPIC;  Surgeon: Jose Hess MD;  Location: [facility];  Service: Endoscopy;  Laterality: N/A;    ENDOSCOPIC ULTRASOUND OF UPPER GASTROINTESTINAL TRACT N/A 2019    Procedure: ULTRASOUND, UPPER GI TRACT, ENDOSCOPIC;  Surgeon: Jose Hess MD;   Location: [facility] ENDO (2ND FLR);  Service: Endoscopy;  Laterality: N/A;    ESOPHAGOGASTRODUODENOSCOPY N/A 2018    Procedure: EGD (ESOPHAGOGASTRODUODENOSCOPY);  Surgeon: Angelo Reynolds MD;  Location: [facility] ENDO (2ND FLR);  Service: Endoscopy;  Laterality: N/A;    ESOPHAGOGASTRODUODENOSCOPY N/A 2019    Procedure: EGD (ESOPHAGOGASTRODUODENOSCOPY);  Surgeon: Ramiro Jefferson MD;  Location: [facility] ENDO (4TH FLR);  Service: Endoscopy;  Laterality: N/A;    EYE SURGERY      Cataract Removal    FLUOROSCOPIC URODYNAMIC STUDY N/A 2021    Procedure: URODYNAMIC STUDY, FLUOROSCOPIC;  Surgeon: Viridiana Valenzuela MD;  Location: [facility] OR 1ST FLR;  Service: Urology;  Laterality: N/A;  90 minutes     HYSTERECTOMY      posterolateral fusion with autograft bone and [place] mineralized bone matrix  13    at [facility] for lumbar spine stenosis    TOE SURGERY      TONSILLECTOMY      TRIGGER FINGER RELEASE         Review of patient's allergies indicates:   Allergen Reactions    Dilaudid [hydromorphone (bulk)] Other (See Comments)     Oversedating, head burning. Pt prefers to avoid.       Codeine Nausea And Vomiting     Other reaction(s): Itching    Dilaudid [hydromorphone]     Percocet  [oxycodone-acetaminophen]      Other reaction(s): Itching    Sulfa (sulfonamide antibiotics)      Other reaction(s): Nausea  Other reaction(s): Itching       No current facility-administered medications on file prior to encounter.     Current Outpatient Medications on File Prior to Encounter   Medication Sig    ascorbic Acid (VITAMIN C) 500 mg CpSR Take 500 mg by mouth once daily.     atorvastatin (LIPITOR) 40 MG tablet TAKE 1 TABLET BY MOUTH  DAILY    azelastine (ASTELIN) 137 mcg (0.1 %) nasal spray 2 sprays (274 mcg total) by Nasal route 2 (two) times daily.    biotin 5,000 mcg TbDL Take 1 tablet by mouth once daily.     butalbital-acetaminophen-caffeine -40 mg (FIORICET, ESGIC) -40 mg per tablet SMARTSI  Tablet(s) By Mouth 1 to 3 Times Daily PRN    cholestyramine-aspartame (CHOLESTYRAMINE LIGHT) 4 gram PwPk Take 1 packet (4 g total) by mouth daily as needed (Diarrhea).    clotrimazole-betamethasone 1-0.05% (LOTRISONE) cream Apply topically 2 (two) times daily.    co-enzyme Q-10 30 mg capsule Take 30 mg by mouth 3 (three) times daily.    conjugated estrogens (PREMARIN) vaginal cream INSERT 1/2 GRAM VAGINALLY  TWICE WEEKLY    cranberry extract 200 mg Cap Take 1 capsule by mouth once daily.     D-MANNOSE ORAL Take 1 tablet by mouth once daily.     DULoxetine (CYMBALTA) 30 MG capsule Take 1 capsule (30 mg total) by mouth 2 (two) times daily.    flash glucose sensor (FREESTYLE EFREN 14 DAY SENSOR) Kit 1 application by Misc.(Non-Drug; Combo Route) route every 14 (fourteen) days. Disp 30 or 90 day refill    FREESTYLE EFREN 10 DAY READER Misc TEST as directed    FREESTYLE LITE STRIPS Strp TEST DAILY AS DIRECTED    hydrocortisone 2.5 % cream Apply topically 2 (two) times daily as needed.    levothyroxine (SYNTHROID) 75 MCG tablet Take 1 tablet (75 mcg total) by mouth before breakfast.    magnesium 30 mg Tab Take 500 capsules by mouth.     metFORMIN (GLUCOPHAGE) 500 MG tablet Take 500 mg by mouth 3 (three) times daily.    mirabegron (MYRBETRIQ) 50 mg Tb24 Take 1 tablet (50 mg total) by mouth once daily.    montelukast (SINGULAIR) 10 mg tablet TAKE 1 TABLET BY MOUTH  DAILY    omega 3-dha-epa-fish oil 1,200 (144-216) mg Cap Take 1 capsule by mouth once daily.     ondansetron (ZOFRAN-ODT) 8 MG TbDL Take 1 tablet (8 mg total) by mouth every 8 (eight) hours as needed (nausea).    pantoprazole (PROTONIX) 20 MG tablet Take 1 tablet (20 mg total) by mouth once daily.    polyethylene glycol (GLYCOLAX) 17 gram/dose powder Take 17 g by mouth before dinner.    promethazine-codeine 6.25-10 mg/5 ml (PHENERGAN WITH CODEINE) 6.25-10 mg/5 mL syrup Take 5 mLs by mouth every 4 to 6 hours as needed for Cough.    vitamin D  1000 units Tab Take 185 mg by mouth once daily. D3     Family History     Problem Relation (Age of Onset)    Alcohol abuse Brother, Brother    Arthritis Daughter, Brother    Asthma Daughter    Bladder Cancer Mother    Breast cancer Sister (79)    Cataracts Mother    Colon cancer Father, Brother (70)    Depression Daughter, Daughter    Glaucoma Mother    Heart disease Father (50), Mother    Hyperlipidemia Mother    Hypertension Daughter    Kidney disease Mother    Parkinsonism Brother    Stroke Daughter (40)        Tobacco Use    Smoking status: Never Smoker    Smokeless tobacco: Never Used   Substance and Sexual Activity    Alcohol use: Yes     Comment: socially, hardly ever    Drug use: No    Sexual activity: Never     Birth control/protection: Abstinence     Review of Systems   Constitutional: Positive for appetite change (decreased), chills, fatigue and fever (99.1). Negative for diaphoresis.   HENT: Negative for congestion, rhinorrhea, sneezing and sore throat.    Eyes: Negative for photophobia and visual disturbance.   Respiratory: Negative for cough, shortness of breath and wheezing.    Cardiovascular: Negative for chest pain, palpitations and leg swelling.   Gastrointestinal: Positive for abdominal pain, constipation and nausea. Negative for abdominal distention, diarrhea and vomiting.   Genitourinary: Negative for difficulty urinating, dysuria, frequency, hematuria and urgency.   Musculoskeletal: Negative for arthralgias, back pain, myalgias and neck pain.   Skin: Negative for color change, pallor, rash and wound.   Neurological: Negative for dizziness, syncope, speech difficulty, light-headedness and headaches.   Psychiatric/Behavioral: Negative for agitation, confusion and hallucinations. The patient is not nervous/anxious.      Objective:     Vital Signs (Most Recent):  Temp: 97.7 °F (36.5 °C) (01/12/22 1545)  Pulse: 70 (01/12/22 1955)  Resp: 16 (01/12/22 1955)  BP: (!) 97/53 (01/12/22 1955)  SpO2:  98 % (01/12/22 1955) Vital Signs (24h Range):  Temp:  [97.7 °F (36.5 °C)-98 °F (36.7 °C)] 97.7 °F (36.5 °C)  Pulse:  [62-74] 70  Resp:  [16-18] 16  SpO2:  [98 %-100 %] 98 %  BP: ()/(53-71) 97/53        There is no height or weight on file to calculate BMI.    Physical Exam  Vitals reviewed.   Constitutional:       General: She is not in acute distress.     Appearance: She is normal weight. She is not toxic-appearing or diaphoretic.   HENT:      Head: Normocephalic and atraumatic.      Nose: Nose normal.      Mouth/Throat:      Mouth: Mucous membranes are moist.   Eyes:      Pupils: Pupils are equal, round, and reactive to light.   Cardiovascular:      Rate and Rhythm: Normal rate and regular rhythm.      Heart sounds: No murmur heard.      Pulmonary:      Effort: Pulmonary effort is normal. No respiratory distress.      Breath sounds: No wheezing, rhonchi or rales.   Abdominal:      General: Bowel sounds are normal. There is no distension.      Palpations: Abdomen is soft.      Tenderness: There is generalized abdominal tenderness (mild TTP). There is no guarding.   Genitourinary:     Comments: deferred  Musculoskeletal:         General: No swelling, tenderness or deformity. Normal range of motion.      Cervical back: Normal range of motion. No tenderness.   Skin:     General: Skin is warm and dry.      Capillary Refill: Capillary refill takes less than 2 seconds.   Neurological:      General: No focal deficit present.      Mental Status: She is alert and oriented to person, place, and time.      Motor: No weakness.   Psychiatric:         Mood and Affect: Mood normal.         Behavior: Behavior normal.           CRANIAL NERVES     CN III, IV, VI   Pupils are equal, round, and reactive to light.       Significant Labs:   All pertinent labs within the past 24 hours have been reviewed.  CBC:   Recent Labs   Lab 01/12/22  1419 01/12/22  1429   WBC 6.24  --    HGB 12.0  --    HCT 37.2 35*   *  --      CMP:    Recent Labs   Lab 01/12/22  1419   *   K 4.1      CO2 27   *   BUN 13   CREATININE 0.8   CALCIUM 9.7   PROT 7.2   ALBUMIN 3.7   BILITOT 1.6*   ALKPHOS 89   AST 26   ALT 24   ANIONGAP 6*   EGFRNONAA >60.0     Lipase:   Recent Labs   Lab 01/12/22  1419   LIPASE 14     Urine Studies:   Recent Labs   Lab 01/12/22  1428   COLORU Yellow   APPEARANCEUA Hazy*   PHUR 8.0   SPECGRAV 1.010   PROTEINUA Negative   GLUCUA Negative   KETONESU Negative   BILIRUBINUA Negative   OCCULTUA Negative   NITRITE Negative   LEUKOCYTESUR 3+*   RBCUA 6*   WBCUA >100*   BACTERIA Rare   SQUAMEPITHEL 0       Significant Imaging: I have reviewed all pertinent imaging results/findings within the past 24 hours.     Imaging Results           CT Abdomen Pelvis With Contrast (Final result)  Result time 01/12/22 16:09:40    Final result by Guerrero Busch Jr., MD (01/12/22 16:09:40)                 Impression:      Segment of dilated small bowel with fecalization of the luminal material measuring up to 3.8 cm with likely transition point in the right lower quadrant as described above.  Findings likely represent partial small bowel obstruction.  Increased soft tissue and close approximation of the small bowel loops to the anterior abdominal wall likely representing adhesions.    Lateralization of the position of the small bowel in regards to the ascending colon potentially representing internal hernia.    Stable mild right hydronephrosis.    Splenomegaly.    Diffusely hypoattenuating liver suggestive of steatosis.  Consider correlation with LFTs.  Stable nonspecific enhancing focus within the right hepatic dome with potentially flash filling hemangioma.    Increased soft tissue about the celiac axis and SMA as well as multiple mesenteric and olga hepatis nodes.    Additional findings as above.    This report was flagged in Epic as abnormal.    Electronically signed by resident: Primo  Stubenrauch  Date:    01/12/2022  Time:    15:23    Electronically signed by: Guerrero Busch MD  Date:    01/12/2022  Time:    16:09             Narrative:    EXAMINATION:  CT ABDOMEN PELVIS WITH CONTRAST    CLINICAL HISTORY:  Bowel obstruction suspected;Abdominal pain, acute, nonlocalized;    TECHNIQUE:  The patient was surveyed from the lung bases through the pelvis after the administration of 75 cc Omni 350 IV contrast.  Oral contrast was not administered..  The data was reconstructed for coronal, sagittal, and axial images.    COMPARISON:  CT 01/02/2021, 10/15/2021, 05/14/2021    FINDINGS:  CHEST:    Lungs/Pleura: Mild bibasilar atelectasis.  No concerning nodules.  No focal consolidation, pneumothorax, or pleural fluid.    Heart: The visualized portions of the heart are normal. No pericardial effusion.    Thoracic soft tissues: Unremarkable    ABDOMEN:    Liver: Liver appears normal in size.  Liver appears diffusely hypoattenuating suggesting hepatic steatosis.  Indeterminate arterial enhancing foci in the right hepatic dome and another possible small enhancing focus within the left hepatic lobe (series 2, image 43), potentially representing flash filling hemangiomas.    Gallbladder/Bile ducts: Gallbladder is surgically absent.  Common bile duct is within normal limits status post cholecystectomy.    Spleen:Mild splenomegaly.  Accessory splenule noted.    Stomach: Small hiatal hernia.    Pancreas: Fatty atrophy of the pancreas.  Subcentimeter hypodensity in the pancreatic body (series 2, image 46) potentially representing interspersed fat within the pancreatic parenchyma, side branch IPMN, pseudocyst, or other cystic neoplasm.    Adrenals: Unremarkable.    Renal/Ureters: The kidneys are normal in size and location. Mild right hydronephrosis similar to prior exam.  No left hydronephrosis.  No solid enhancing masses or nephrolithiasis.  Punctate hypodensities in the right midpole and upper pole too small to  characterize but likely cyst.  The ureters are normal in course and caliber. The urinary bladder is unremarkable.    Reproductive: Uterus is absent.    Bowel: Postsurgical changes of partial small bowel resection.  Segment of dilated small bowel along the midline which demonstrates dilation up to 3.8 cm with fecalization of luminal material.  Transition point can be seen on series 2, image 106 and coronal images series 601, image 99.  Collapsed bowel distally and proximally to the dilated portions of the bowel.  These bowel loops closely approximates the ventral abdominal wall with greater than expected soft tissue stranding postsurgical along the ventral abdominal wall, likely adhesions.  Scattered segments of small bowel wall thickening within the right lower quadrant likely due to nondistention.  Stable stranding along the ascending colon and right lower quadrant, potentially postsurgical.  No evidence of portal venous gas or pneumatosis at this time.  Ascending colon appears decompressed compared to the prior exam.  There is loops of small bowel lateral to the ascending colon suggesting internal hernia or postop change.    Peritoneum: no ascites, free fluid, or intraperitoneal free air.    Lymph Nodes: Multiple prominent mesenteric lymph nodes within the midline/right lower quadrant without definite pathologic enlargement, similar to prior.  Multiple nodes about the olga hepatis.  Increased soft tissue about the celiac and SMA also appears similar.    Aorta: The abdominal aorta is normal in course and caliber.  Mild-to-moderate atherosclerotic calcifications.    Bones: Grade 2 anterolisthesis of L5 on S1 and grade 1 anterolisthesis of L4 on L5.  There is partial degenerative fusion of L5-S1.  Bilateral L5 pars defects.  Extensive degenerative change of the spine.  No acute fractures or osseous destructive lesions.    Soft Tissues: Ventral abdominal wall scarring.                                   Assessment/Plan:     * Generalized abdominal pain  76 yo female with chronic abdominal pain, constipation, h/o multiple abdominal surgeries and admission for suspected bowel obstructions. General surgery evaluated patient in the ED and feel her clinical history this episode and exam is inconsistent with bowel obstruction - this episode is most likely a functional problem. Recs include clear liquid diet, bowel regimen, and GI consult.      - CT abd/pelvis reviewed. It looks very similar to CT from 01/02 on last admission. Fecalization of stool within the small bowel consistent with chronic constipation  - Clear liquid diet, advance as tolerated.  -IVF  -PRN antiemetics  -Scheduled tylenol and careful use of PRN narcotics  -Started on bowel regimen of miralax and senna-docusate  -GI consult, appreciate recs    Partial small bowel obstruction  See generalized abd pain above    Constipation  Chronic issue.  -Miralax daily and senna-colace bid    Acute cystitis without hematuria  -UA reviewed, follow cx.   -Started on rocephin in the ED, changed to ciprofloxacin based on previous urine cxs    Component 5 mo ago   Urine Culture, Routine  Abnormal   CITROBACTER FREUNDII   10,000 - 49,999 cfu/ml   No other significant isolate     Resulting Agency [facility]          Susceptibility     Citrobacter freundii     CULTURE, URINE     Amox/K Clav'ate >16/8 mcg/mL Resistant     Amp/Sulbactam >16/8 mcg/mL Resistant     Cefazolin >16 mcg/mL Resistant     Cefepime <=2 mcg/mL Sensitive     Ceftriaxone 32 mcg/mL Resistant     Ciprofloxacin <=1 mcg/mL Sensitive     Ertapenem <=0.5 mcg/mL Sensitive     Gentamicin <=4 mcg/mL Sensitive     Levofloxacin <=2 mcg/mL Sensitive     Meropenem <=1 mcg/mL Sensitive     Nitrofurantoin <=32 mcg/mL Sensitive     Piperacillin/Tazo <=16 mcg/mL Sensitive     Tetracycline <=4 mcg/mL Sensitive     Tobramycin <=4 mcg/mL Sensitive     Trimeth/Sulfa <=2/38 mcg/mL Sensitive                         Other  hyperlipidemia   Patient is chronically on statin.will continue for now. Monitor clinically. Last LDL was   Lab Results   Component Value Date    LDLCALC 58.2 (L) 08/04/2020       Mixed stress and urge urinary incontinence  - home mirabegron not on formulary, oxybutynin for now    Type 2 diabetes mellitus without complication, without long-term current use of insulin  Patient's FSGs are controlled on current hypoglycemics.   Last A1c reviewed-   Lab Results   Component Value Date    HGBA1C 6.7 (H) 10/16/2021     Most recent fingerstick glucose reviewed- No results for input(s): POCTGLUCOSE in the last 24 hours.  Current correctional scale  Low  Maintain anti-hyperglycemic dose as follows-   Antihyperglycemics (From admission, onward)            Start     Stop Route Frequency Ordered    01/12/22 2059  insulin aspart U-100 pen 0-5 Units         -- SubQ Before meals & nightly PRN 01/12/22 1959      Accuchecks AC/HS    Gait instability  -Fall precautions    GERD (gastroesophageal reflux disease)  -Continue PPI    Hypothyroid  Patient has chronic hypothyroidism. TFTs reviewed-   Lab Results   Component Value Date    TSH 2.432 07/06/2021   . Will continue chronic levothyroxine and adjust for and clinical changes.      VTE Risk Mitigation (From admission, onward)         Ordered     enoxaparin injection 40 mg  Daily         01/12/22 1959     IP VTE HIGH RISK PATIENT  Once         01/12/22 1959     Place sequential compression device  Until discontinued         01/12/22 1959                   Ariadna Lozano NP  Department of Hospital Medicine   [facility] - Telemetry Stepdown ([place]-)

## 2022-01-13 NOTE — HPI
Anayeli Judd is a 78 y/o F with a PMH of IBS (diarrhea and constipation), colon cancer s/p resection (2010), multiple SBO s/p ex lap x2, HLD, hypothyroidism, GERD, DM2, and urinary incontinence who presented with abdominal pain.  She was recently admitted 1/1-1/4 for abdominal pain and nausea, CT showed partial SBO, was given enemas and gastrograffin challenge.    She reports that the pain began one week ago, and has gradually worsened. The pain was initially intermittent but became constant two days ago. She reports cramping pain that is currently localized in her LUQ, but has moved around her abdomen. The pain has improved since admission. She also reports two days of nausea, denies vomiting. She reports chronic diarrhea, which she feels has been worsening. She states that prior to admission, she was having multiple large volume watery BM per day, associated with meals. On further questioning, she also reports having formed BM and episodes of constipation over the last week. She denies melena, hematochezia. She denies having a BM since admission. Last BM was yesterday morning, reports that she is passing gas.    Last EGD 6/2019 for abdominal pain was unremarkable. Last colonoscopy 6/2019 for constipation and hx of colon adenoma with one polyp removed, diverticulosis, otherwise normal.     VS unremarkable. Hgb 10.3, WBC 5.13, plt 88, Tbili 1.5. CT abdomen pelvis shows partial SBO with transition point in RLQ.   Lab Results   Component Value Date    HGBA1C 8 6 (H) 02/26/2021       Recent Labs     07/14/21  0109   POCGLU 86       Blood Sugar Average: Last 72 hrs:  (P) 86     · Continue home dose Glargine 55 units in am  · Hold home dose Glucophage while inpt  · FSBS AC & HS w SSI  · Diabetic diet  · A1C in am

## 2022-01-13 NOTE — CONSULTS
[facility] ([place] [address])  Gastroenterology  Consult Note    Patient Name: Anayeli Judd  MRN: [id number]  Admission Date: 1/12/2022  Hospital Length of Stay: 0 days  Code Status: Full Code   Attending Provider: Salima Joseph MD   Consulting Provider: Ivy Castro (Hettick name: Niharika), MD  Primary Care Physician: Rocio Mc MD  Principal Problem:Generalized abdominal pain    Inpatient consult to Gastroenterology  Consult performed by: Ivy Castro MD  Consult ordered by: Ariadna Lozano NP        Subjective:     HPI:  Anayeli Judd is a 76 y/o F with a PMH of IBS (diarrhea and constipation), colon cancer s/p resection (2010), multiple SBO s/p ex lap x2, HLD, hypothyroidism, GERD, DM2, and urinary incontinence who presented with abdominal pain.  She was recently admitted 1/1-1/4 for abdominal pain and nausea, CT showed partial SBO, was given enemas and gastrograffin challenge.    She reports that the pain began one week ago, and has gradually worsened. The pain was initially intermittent but became constant two days ago. She reports cramping pain that is currently localized in her LUQ, but has moved around her abdomen. The pain has improved since admission. She also reports two days of nausea, denies vomiting. She reports chronic diarrhea, which she feels has been worsening. She states that prior to admission, she was having multiple large volume watery BM per day, associated with meals. On further questioning, she also reports having formed BM and episodes of constipation over the last week. She denies melena, hematochezia. She denies having a BM since admission. Last BM was yesterday morning, reports that she is passing gas.    Last EGD 6/2019 for abdominal pain was unremarkable. Last colonoscopy 6/2019 for constipation and hx of colon adenoma with one polyp removed, diverticulosis, otherwise normal.     VS unremarkable. Hgb 10.3, WBC 5.13, plt 88, Tbili 1.5. CT  abdomen pelvis shows partial SBO with transition point in RLQ.      Past Medical History:   Diagnosis Date    Abdominal pain     Allergic rhinitis     Arthritis     Blood platelet disorder     Blood platelet disorder     Blood transfusion     during delivery and     Bowel obstruction     Cervical radiculopathy     followed by dr cloud    Colon cancer     transverse colon; resected; Stage IIA (pT3 pN0 MX)    Diabetes mellitus     Diarrhea     Falls 2020    Family history of breast cancer     Family history of colon cancer     Fatty liver     GERD (gastroesophageal reflux disease)     History of shingles     Hyperlipidemia     Hypothyroidism     Irritable bowel syndrome     Microscopic colitis     treated     Raynaud phenomenon     Raynaud's disease     Type 2 diabetes mellitus        Past Surgical History:   Procedure Laterality Date    APPENDECTOMY      BACK SURGERY      CARPAL TUNNEL RELEASE      bilateral      SECTION      CHOLECYSTECTOMY  1965    COLECTOMY  2011    Transverse colon resection by Dr. Aguirre    COLONOSCOPY N/A 2017    Procedure: COLONOSCOPY;  Surgeon: Manjit Alvarez MD;  [facility]  Service: Endoscopy;  Laterality: N/A;    COLONOSCOPY N/A 2019    Procedure: COLONOSCOPY;  Surgeon: Ramiro Jefferson MD;  [facility]  Service: Endoscopy;  Laterality: N/A;  PM prep    CYSTOSCOPY N/A 2021    Procedure: CYSTOSCOPY;  Surgeon: Viridiana Valenzuela MD;  [address]  Service: Urology;  Laterality: N/A;    ENDOSCOPIC ULTRASOUND OF UPPER GASTROINTESTINAL TRACT N/A 2018    Procedure: ULTRASOUND, UPPER GI TRACT, ENDOSCOPIC;  Surgeon: Jose Hess MD;  Location[facility];  Service: Endoscopy;  Laterality: N/A;    ENDOSCOPIC ULTRASOUND OF UPPER GASTROINTESTINAL TRACT N/A 2019    Procedure: ULTRASOUND, UPPER GI TRACT, ENDOSCOPIC;  Surgeon: Jose Hess MD;   Location: [facility] ENDO (2ND FLR);  Service: Endoscopy;  Laterality: N/A;    ESOPHAGOGASTRODUODENOSCOPY N/A 11/16/2018    Procedure: EGD (ESOPHAGOGASTRODUODENOSCOPY);  Surgeon: Angelo Reynolds MD;  Location: [facility] ENDO (2ND FLR);  Service: Endoscopy;  Laterality: N/A;    ESOPHAGOGASTRODUODENOSCOPY N/A 6/26/2019    Procedure: EGD (ESOPHAGOGASTRODUODENOSCOPY);  Surgeon: Ramiro Jefferson MD;  Location: [facility] ENDO (4TH FLR);  Service: Endoscopy;  Laterality: N/A;    EYE SURGERY      Cataract Removal    FLUOROSCOPIC URODYNAMIC STUDY N/A 11/9/2021    Procedure: URODYNAMIC STUDY, FLUOROSCOPIC;  Surgeon: Viridiana Valenzuela MD;  Location: [facility] OR 1ST FLR;  Service: Urology;  Laterality: N/A;  90 minutes     HYSTERECTOMY      posterolateral fusion with autograft bone and [place] mineralized bone matrix  2/1/13    at [facility] for lumbar spine stenosis    TOE SURGERY      TONSILLECTOMY      TRIGGER FINGER RELEASE         Review of patient's allergies indicates:   Allergen Reactions    Dilaudid [hydromorphone (bulk)] Other (See Comments)     Oversedating, head burning. Pt prefers to avoid.       Codeine Nausea And Vomiting     Other reaction(s): Itching    Dilaudid [hydromorphone]     Percocet  [oxycodone-acetaminophen]      Other reaction(s): Itching    Sulfa (sulfonamide antibiotics)      Other reaction(s): Nausea  Other reaction(s): Itching     Family History     Problem Relation (Age of Onset)    Alcohol abuse Brother, Brother    Arthritis Daughter, Brother    Asthma Daughter    Bladder Cancer Mother    Breast cancer Sister (79)    Cataracts Mother    Colon cancer Father, Brother (70)    Depression Daughter, Daughter    Glaucoma Mother    Heart disease Father (50), Mother    Hyperlipidemia Mother    Hypertension Daughter    Kidney disease Mother    Parkinsonism Brother    Stroke Daughter (40)        Tobacco Use    Smoking status: Never Smoker    Smokeless tobacco: Never Used   Substance and Sexual Activity     Alcohol use: Yes     Comment: socially, hardly ever    Drug use: No    Sexual activity: Not Currently     Review of Systems   Constitutional: Positive for appetite change. Negative for chills and fever.   HENT: Negative.    Respiratory: Negative.    Cardiovascular: Negative.    Gastrointestinal: Positive for abdominal pain, diarrhea and nausea. Negative for abdominal distention, blood in stool, constipation and vomiting.   Genitourinary: Negative.    Musculoskeletal: Negative.    Neurological: Negative.    Psychiatric/Behavioral: Negative.      Objective:     Vital Signs (Most Recent):  Temp: 98.6 °F (37 °C) (01/13/22 1129)  Pulse: 64 (01/13/22 1129)  Resp: 16 (01/13/22 1129)  BP: 122/68 (01/13/22 1129)  SpO2: 98 % (01/13/22 1129) Vital Signs (24h Range):  Temp:  [96.4 °F (35.8 °C)-98.6 °F (37 °C)] 98.6 °F (37 °C)  Pulse:  [62-84] 64  Resp:  [16-18] 16  SpO2:  [95 %-99 %] 98 %  BP: ()/(53-68) 122/68     Weight: 58.9 kg (129 lb 12.8 oz) (01/12/22 2319)  Body mass index is 23.74 kg/m².      Intake/Output Summary (Last 24 hours) at 1/13/2022 1200  Last data filed at 1/13/2022 0600  Gross per 24 hour   Intake 1160 ml   Output 700 ml   Net 460 ml       Lines/Drains/Airways     Peripheral Intravenous Line                 Peripheral IV - Single Lumen 01/12/22 1421 18 G Right Upper Arm <1 day         Peripheral IV - Single Lumen 01/13/22 1005 20 G;1 3/4 in Left Forearm <1 day                Physical Exam  Constitutional:       General: She is not in acute distress.     Appearance: She is not toxic-appearing.   HENT:      Head: Normocephalic and atraumatic.      Nose: Nose normal.   Eyes:      Extraocular Movements: Extraocular movements intact.      Conjunctiva/sclera: Conjunctivae normal.   Cardiovascular:      Rate and Rhythm: Normal rate and regular rhythm.      Heart sounds: No murmur heard.      Pulmonary:      Effort: Pulmonary effort is normal.      Breath sounds: Normal breath sounds.   Abdominal:       General: Abdomen is flat. Bowel sounds are normal. There is no distension.      Palpations: Abdomen is soft.      Tenderness: There is abdominal tenderness (generalized). There is no guarding.   Musculoskeletal:         General: Normal range of motion.   Neurological:      Mental Status: She is alert and oriented to person, place, and time.   Psychiatric:         Mood and Affect: Mood normal.         Behavior: Behavior normal.         Significant Labs:  CBC:   Recent Labs   Lab 01/12/22  1419 01/12/22  1429 01/13/22  0323   WBC 6.24  --  5.13   HGB 12.0  --  10.3*   HCT 37.2 35* 32.1*   *  --  88*     CMP:   Recent Labs   Lab 01/13/22  0323   *   CALCIUM 8.8   ALBUMIN 3.1*   PROT 6.0      K 3.7   CO2 24      BUN 11   CREATININE 0.7   ALKPHOS 82   ALT 24   AST 26   BILITOT 1.5*       Significant Imaging:  Imaging results within the past 24 hours have been reviewed.    Assessment/Plan:     * Generalized abdominal pain  78 y/o F with PMH of IBS (diarrhea and constipation), colon cancer s/p resection (2010), multiple SBO s/p ex lap x2 who presents with abdominal pain and worsening chronic diarrhea. Follows with Dr. Parker. CT abdomen pelvis concerning for partial SBO, evaluated by Gen Surg who believe this to be a functional problem and recommended GI consult. Abdominal pain improving and no episodes of diarrhea since admission.    Recommendations:  - supportive treatment  - advance diet as tolerated  - outpatient GI follow up         Thank you for your consult. I will follow-up with patient. Please contact us if you have any additional questions.    Iyv Castro MD  Gastroenterology  [facility] - Tele[facility] ([place]-)

## 2022-01-13 NOTE — NURSING
Er had a difficult time getting an iv in pt.had to be placed using an US Machine.however,pt was sent up to the unit with an infiltrated iv.iv restarted after 2 attempts.however,the iv has infiltrated again.will notify PICC Team this am to place a peripheral iv for iv antibiotics.

## 2022-01-13 NOTE — HPI
"Anayeli Judd is a 77 y.o. female with a PMHx of HLD, hypothyroidism, GERD, IBS, DM2, urinary incontinence, colon cancer s/p resection (2010), and multiple SBO s/p ex lap x2 who presents to the ED with complaints of abdominal pain and nausea. The patient reports severe generalized abdominal pain beginning last night and worsening this morning. Described as cramping pain that comes in waves. Denies any aggravating or alleviating factors. The patient does endorse fatigue, decrease appetite, chills and fever Tmax 99.1. Denies any vomiting. The patient notes she has chronic issues with constipation but states her BMs were more regular the last few days. Reports her last BM was this morning and was soft but "formed." Of note the patient was recently admitted at [facility] under general surgery for abdominal pain and partial SBO from 1/1-1/4. Her symptoms at that time were attributed to constipation. The patient denies any chest pain, shortness of breath, cough, diarrhea, dysuria, urinary frequency, lightheadedness, or dizziness.    In the ED, VSSAF. CBC with thrombocytopenia which is similar to previous labs. Tbili 1.6, appears elevated at baseline. AST/ALT and alk phos WNL. Lipase WNL. Lactate WNL. UA with 3+ leukocytes, >100 WBCs, and rare bacteria. The patient received dicyclomine, morphine, zofran, and rocephin while in the ED. The ED provider consulted general surgery who evaluated the patient and feel no surgical intervention is needed at this time.  "

## 2022-01-13 NOTE — ASSESSMENT & PLAN NOTE
Patient has chronic hypothyroidism. TFTs reviewed-   Lab Results   Component Value Date    TSH 2.432 07/06/2021   . Will continue chronic levothyroxine and adjust for and clinical changes.

## 2022-01-13 NOTE — PLAN OF CARE
Pt admitted and care plan initiated.ivf in progress.continue iv antibiotics.reports adequate pain relief with iv Morphine.vss.safety precautions implemented.bed in low position.rails up x3.call bell in reach.bed alarm in use fro pt safety.continue plan of care.

## 2022-01-14 LAB
POCT GLUCOSE: 101 MG/DL (ref 70–110)
POCT GLUCOSE: 151 MG/DL (ref 70–110)
POCT GLUCOSE: 154 MG/DL (ref 70–110)
POCT GLUCOSE: 208 MG/DL (ref 70–110)

## 2022-01-14 PROCEDURE — 99232 PR SUBSEQUENT HOSPITAL CARE,LEVL II: ICD-10-PCS | Mod: ,,, | Performed by: FAMILY MEDICINE

## 2022-01-14 PROCEDURE — 99233 PR SUBSEQUENT HOSPITAL CARE,LEVL III: ICD-10-PCS | Mod: ,,, | Performed by: PHYSICIAN ASSISTANT

## 2022-01-14 PROCEDURE — 25500020 PHARM REV CODE 255: Performed by: INTERNAL MEDICINE

## 2022-01-14 PROCEDURE — 99232 SBSQ HOSP IP/OBS MODERATE 35: CPT | Mod: ,,, | Performed by: FAMILY MEDICINE

## 2022-01-14 PROCEDURE — 25000003 PHARM REV CODE 250: Performed by: NURSE PRACTITIONER

## 2022-01-14 PROCEDURE — 99233 SBSQ HOSP IP/OBS HIGH 50: CPT | Mod: ,,, | Performed by: PHYSICIAN ASSISTANT

## 2022-01-14 PROCEDURE — 63600175 PHARM REV CODE 636 W HCPCS: Performed by: NURSE PRACTITIONER

## 2022-01-14 PROCEDURE — 11000001 HC ACUTE MED/SURG PRIVATE ROOM

## 2022-01-14 PROCEDURE — 25000003 PHARM REV CODE 250: Performed by: PHYSICIAN ASSISTANT

## 2022-01-14 PROCEDURE — 94761 N-INVAS EAR/PLS OXIMETRY MLT: CPT

## 2022-01-14 RX ORDER — POLYETHYLENE GLYCOL 3350 17 G/17G
17 POWDER, FOR SOLUTION ORAL 2 TIMES DAILY
Status: DISCONTINUED | OUTPATIENT
Start: 2022-01-14 | End: 2022-01-15 | Stop reason: HOSPADM

## 2022-01-14 RX ADMIN — ATORVASTATIN CALCIUM 40 MG: 40 TABLET, FILM COATED ORAL at 09:01

## 2022-01-14 RX ADMIN — IOHEXOL 200 ML: 350 INJECTION, SOLUTION INTRAVENOUS at 02:01

## 2022-01-14 RX ADMIN — Medication 1 CAPSULE: at 04:01

## 2022-01-14 RX ADMIN — PANTOPRAZOLE SODIUM 20 MG: 20 TABLET, DELAYED RELEASE ORAL at 09:01

## 2022-01-14 RX ADMIN — ACETAMINOPHEN 1000 MG: 500 TABLET ORAL at 04:01

## 2022-01-14 RX ADMIN — ACETAMINOPHEN 1000 MG: 500 TABLET ORAL at 10:01

## 2022-01-14 RX ADMIN — DULOXETINE 30 MG: 30 CAPSULE, DELAYED RELEASE ORAL at 10:01

## 2022-01-14 RX ADMIN — ACETAMINOPHEN 1000 MG: 500 TABLET ORAL at 05:01

## 2022-01-14 RX ADMIN — DULOXETINE 30 MG: 30 CAPSULE, DELAYED RELEASE ORAL at 09:01

## 2022-01-14 RX ADMIN — CIPROFLOXACIN 400 MG: 2 INJECTION, SOLUTION INTRAVENOUS at 11:01

## 2022-01-14 RX ADMIN — SENNOSIDES AND DOCUSATE SODIUM 1 TABLET: 50; 8.6 TABLET ORAL at 09:01

## 2022-01-14 RX ADMIN — ENOXAPARIN SODIUM 40 MG: 100 INJECTION SUBCUTANEOUS at 04:01

## 2022-01-14 RX ADMIN — OXYBUTYNIN CHLORIDE 10 MG: 10 TABLET, EXTENDED RELEASE ORAL at 09:01

## 2022-01-14 RX ADMIN — POLYETHYLENE GLYCOL 3350 17 G: 17 POWDER, FOR SOLUTION ORAL at 09:01

## 2022-01-14 RX ADMIN — AZELASTINE HYDROCHLORIDE 274 MCG: 137 SPRAY, METERED NASAL at 10:01

## 2022-01-14 RX ADMIN — AZELASTINE HYDROCHLORIDE 274 MCG: 137 SPRAY, METERED NASAL at 09:01

## 2022-01-14 RX ADMIN — LEVOTHYROXINE SODIUM 75 MCG: 75 TABLET ORAL at 06:01

## 2022-01-14 RX ADMIN — MONTELUKAST 10 MG: 10 TABLET, FILM COATED ORAL at 09:01

## 2022-01-14 RX ADMIN — CIPROFLOXACIN 400 MG: 2 INJECTION, SOLUTION INTRAVENOUS at 12:01

## 2022-01-14 NOTE — ASSESSMENT & PLAN NOTE
76 yo female with chronic abdominal pain, constipation, h/o multiple abdominal surgeries and admission for suspected bowel obstructions. Her clinical history this episode and exam is inconsistent with bowel obstruction - this episode is most likely a functional problem.   CT abd/pelvis reviewed. It looks very similar to CT from 01/02 on last admission. Fecalization of stool within the small bowel consistent with chronic constipation  Vascular inflow looks good, ischemic colitis unlikely.    - No surgical intervention warranted.    - Recommend bowel regiment - enemas, laxatives, fiber  - Pro-motility agents may assist, could consider antiflatulents, bentyl, etc per GI.  - Dietary modification may also help. Several small meals per day (6 small meals) as opposed to 3 larger meals would likely be beneficial.

## 2022-01-14 NOTE — PROGRESS NOTES
[facility] Tele[facility] ([place] [address])  General Surgery  Progress Note    Subjective:     History of Present Illness:  Anayeli Judd is a 76 y.o. female with hx of GERD, HLD, hypothyroidism, DM II, IBS, transverse colon cancer s/p resection (2010), multiple SBO s/p ex lap x2, SBR, urinary incontinence who presents to the ED with abdominal pain and nausea. She has been worked up for chronic abdominal pain and diarrhea and has been admitted multiple times.      Her SBO history includes ex lap with Dr. Silvestre in 2014 - had SBRx2 and enterolysis after failed conservative management. Then again in 2017, underwent ex lap with Dr. Ward. Had small bowel repair x 14 and placement of G tube. She had dense adhesions that required lysis for >2 hours.     She presents today with several days of generalized, crampy abdominal pain that she feels comes in waves and has been worsening. She has continued to have bowel movements, last BM this am. She had one instance of nausea without vomiting this am. She reports no other symptoms.          Post-Op Info:  * No surgery found *         Interval History: NAEON. No BM overnight. Reports improvement in abdominal pain despite this. Tolerating diet.     Medications:  Continuous Infusions:  Scheduled Meds:   acetaminophen  1,000 mg Oral Q8H    atorvastatin  40 mg Oral Daily    azelastine  2 spray Nasal BID    ciprofloxacin  400 mg Intravenous Q12H    DULoxetine  30 mg Oral BID    enoxaparin  40 mg Subcutaneous Daily    levothyroxine  75 mcg Oral Before breakfast    montelukast  10 mg Oral Daily    oxybutynin  10 mg Oral Daily    pantoprazole  20 mg Oral Daily    polyethylene glycol  17 g Oral Daily    senna-docusate 8.6-50 mg  1 tablet Oral BID     PRN Meds:butalbital-acetaminophen-caffeine -40 mg, dextrose 50%, dextrose 50%, diphenhydrAMINE, glucagon (human recombinant), glucose, glucose, insulin aspart U-100, melatonin, morphine, naloxone, ondansetron,  oxyCODONE, promethazine (PHENERGAN) IVPB, sodium chloride 0.9%     Review of patient's allergies indicates:   Allergen Reactions    Dilaudid [hydromorphone (bulk)] Other (See Comments)     Oversedating, head burning. Pt prefers to avoid.       Codeine Nausea And Vomiting     Other reaction(s): Itching    Dilaudid [hydromorphone]     Percocet  [oxycodone-acetaminophen]      Other reaction(s): Itching    Sulfa (sulfonamide antibiotics)      Other reaction(s): Nausea  Other reaction(s): Itching     Objective:     Vital Signs (Most Recent):  Temp: 98.4 °F (36.9 °C) (01/14/22 1147)  Pulse: 62 (01/14/22 1147)  Resp: 18 (01/14/22 1147)  BP: 130/61 (01/14/22 1147)  SpO2: 98 % (01/14/22 1147) Vital Signs (24h Range):  Temp:  [96.9 °F (36.1 °C)-98.4 °F (36.9 °C)] 98.4 °F (36.9 °C)  Pulse:  [58-65] 62  Resp:  [16-18] 18  SpO2:  [95 %-98 %] 98 %  BP: ()/(46-61) 130/61     Weight: 59.1 kg (130 lb 3.2 oz)  Body mass index is 23.81 kg/m².    Intake/Output - Last 3 Shifts       01/12 0700  01/13 0659 01/13 0700  01/14 0659 01/14 0700  01/15 0659    P.O. 210      I.V. (mL/kg) 750 (12.7)      IV Piggyback 200      Total Intake(mL/kg) 1160 (19.7)      Urine (mL/kg/hr) 700 2200 (1.6)     Emesis/NG output 0      Total Output 700 2200     Net +460 -2200                  Physical Exam  Vitals and nursing note reviewed.   Constitutional:       Appearance: Normal appearance.   HENT:      Head: Normocephalic and atraumatic.   Cardiovascular:      Rate and Rhythm: Normal rate and regular rhythm.   Pulmonary:      Effort: Pulmonary effort is normal. No respiratory distress.   Abdominal:      General: Abdomen is flat. There is no distension.      Palpations: Abdomen is soft. There is no mass.      Tenderness: There is abdominal tenderness. There is no guarding or rebound.      Hernia: No hernia is present.      Comments: Scars from multiple abdominal surgeries  Mild tenderness to palpation     Skin:     General: Skin is warm and dry.    Neurological:      General: No focal deficit present.      Mental Status: She is alert and oriented to person, place, and time. Mental status is at baseline.   Psychiatric:         Mood and Affect: Mood normal.         Behavior: Behavior normal.          Significant Labs:  I have reviewed all pertinent lab results within the past 24 hours.  CBC:   Recent Labs   Lab 01/13/22  0323   WBC 5.13   RBC 3.25*   HGB 10.3*   HCT 32.1*   PLT 88*   MCV 99*   MCH 31.7*   MCHC 32.1     CMP:   Recent Labs   Lab 01/13/22  0323   *   CALCIUM 8.8   ALBUMIN 3.1*   PROT 6.0      K 3.7   CO2 24      BUN 11   CREATININE 0.7   ALKPHOS 82   ALT 24   AST 26   BILITOT 1.5*       Significant Diagnostics:  I have reviewed all pertinent imaging results/findings within the past 24 hours.  CT: I have reviewed all pertinent results/findings within the past 24 hours and my personal findings are:  CT Abdomen Pelvis with Contrast 01/12:      Segment of dilated small bowel with fecalization of the luminal material measuring up to 3.8 cm with likely transition point in the right lower quadrant as described above.      Findings likely represent partial small bowel obstruction.  Increased soft tissue and close approximation of the small bowel loops to the anterior abdominal wall likely representing adhesions.     Lateralization of the position of the small bowel in regards to the ascending colon potentially representing internal hernia.     Stable mild right hydronephrosis.     Splenomegaly.     Diffusely hypoattenuating liver suggestive of steatosis.  Consider correlation with LFTs.  Stable nonspecific enhancing focus within the right hepatic dome with potentially flash filling hemangioma.     Increased soft tissue about the celiac axis and SMA as well as multiple mesenteric and olga hepatis nodes.     Assessment/Plan:     * Generalized abdominal pain  76 yo female with chronic abdominal pain, constipation, h/o multiple abdominal  surgeries and admission for suspected bowel obstructions. Her clinical history this episode and exam is inconsistent with bowel obstruction - this episode is most likely a functional problem.   CT abd/pelvis reviewed. It looks very similar to CT from 01/02 on last admission. Fecalization of stool within the small bowel consistent with chronic constipation  Vascular inflow looks good, ischemic colitis unlikely.    - No surgical intervention warranted.    - Recommend bowel regiment - enemas, laxatives, fiber  - Pro-motility agents may assist, could consider antiflatulents, bentyl, etc per GI.  - Dietary modification may also help. Several small meals per day (6 small meals) as opposed to 3 larger meals would likely be beneficial.         Ayesha Hendrickson MD  General Surgery  [facility] - Telemetry Stepdown ([place]-)

## 2022-01-14 NOTE — ASSESSMENT & PLAN NOTE
78 y/o F with PMH of IBS (diarrhea and constipation), colon cancer s/p resection (2010), multiple SBO s/p ex lap x2 who presents with abdominal pain and worsening chronic diarrhea. Follows with Dr. Parker. CT abdomen pelvis concerning for partial SBO, evaluated by Gen Surg who believe this to be a functional problem and recommended GI consult. Abdominal pain improving and no episodes of diarrhea since admission.    Recommendations:  - recommend Small bowel follow through  - optimize activity, promote ambulation  - supportive treatment  - advance diet as tolerated  - outpatient GI follow up

## 2022-01-14 NOTE — PROGRESS NOTES
[facility] ([place] [id number])  Gastroenterology  Progress Note    Patient Name: Anayeli Judd  MRN: [id number]  Admission Date: 1/12/2022  Hospital Length of Stay: 1 days  Code Status: Full Code   Attending Provider: Salima Joseph MD  Consulting Provider: Marlene Schuster DNP  Primary Care Physician: Rocio Mc MD  Principal Problem: Generalized abdominal pain      Subjective:   GI initially consulted on 1/12 for abdominal pain, partial SBO.    Interval History: reports her last bowel movement was Saturday (5 days ago), however notes indicate last reported BM was 1/12.  She confirms she is passing gas.  Reports abdominal pain that occurs when she is eating.  She is on full liquid diet at this time.  Daughter, Danielle, called on speaker phone during visit.  States every time this has happened, a contrasted exam is what helps her finally have bowel movements.    Review of Systems   Gastrointestinal: Positive for abdominal pain and constipation. Negative for nausea and vomiting.        +flatulence     Objective:     Vital Signs (Most Recent):  Temp: 98.4 °F (36.9 °C) (01/14/22 1147)  Pulse: 62 (01/14/22 1147)  Resp: 18 (01/14/22 1147)  BP: 130/61 (01/14/22 1147)  SpO2: 98 % (01/14/22 1147) Vital Signs (24h Range):  Temp:  [96.9 °F (36.1 °C)-98.4 °F (36.9 °C)] 98.4 °F (36.9 °C)  Pulse:  [58-65] 62  Resp:  [16-18] 18  SpO2:  [95 %-98 %] 98 %  BP: ()/(46-61) 130/61     Weight: 59.1 kg (130 lb 3.2 oz) (01/14/22 0500)  Body mass index is 23.81 kg/m².      Intake/Output Summary (Last 24 hours) at 1/14/2022 1204  Last data filed at 1/14/2022 0446  Gross per 24 hour   Intake --   Output 2200 ml   Net -2200 ml       Lines/Drains/Airways     Peripheral Intravenous Line                 Peripheral IV - Single Lumen 01/12/22 1421 18 G Right Upper Arm 1 day         Peripheral IV - Single Lumen 01/13/22 1005 20 G;1 3/4 in Left Forearm 1 day                Physical Exam  Vitals reviewed.    Constitutional:       General: She is not in acute distress.  HENT:      Head: Normocephalic and atraumatic.   Eyes:      Extraocular Movements: Extraocular movements intact.   Pulmonary:      Effort: Pulmonary effort is normal. No respiratory distress.   Abdominal:      General: Abdomen is flat. Bowel sounds are decreased.      Palpations: Abdomen is soft.      Tenderness: There is abdominal tenderness (RUQ).   Skin:     General: Skin is warm and dry.      Capillary Refill: Capillary refill takes less than 2 seconds.      Coloration: Skin is pale.   Neurological:      General: No focal deficit present.      Mental Status: She is alert and oriented to person, place, and time. Mental status is at baseline.   Psychiatric:         Mood and Affect: Mood normal.         Behavior: Behavior normal.         Thought Content: Thought content normal.         Significant Labs:  CBC:   Recent Labs   Lab 01/12/22  1419 01/12/22  1429 01/13/22  0323   WBC 6.24  --  5.13   HGB 12.0  --  10.3*   HCT 37.2 35* 32.1*   *  --  88*     CMP:   Recent Labs   Lab 01/13/22  0323   *   CALCIUM 8.8   ALBUMIN 3.1*   PROT 6.0      K 3.7   CO2 24      BUN 11   CREATININE 0.7   ALKPHOS 82   ALT 24   AST 26   BILITOT 1.5*     Coagulation: No results for input(s): PT, INR, APTT in the last 48 hours.  All pertinent lab results from the last 24 hours have been reviewed.      Significant Imaging:  Imaging results within the past 24 hours have been reviewed.    Assessment/Plan:     * Generalized abdominal pain  76 y/o F with PMH of IBS (diarrhea and constipation), colon cancer s/p resection (2010), multiple SBO s/p ex lap x2 who presents with abdominal pain and worsening chronic diarrhea. Follows with Dr. Parker. CT abdomen pelvis concerning for partial SBO, evaluated by Gen Surg who believe this to be a functional problem and recommended GI consult. Abdominal pain improving and no episodes of diarrhea since  admission.    Recommendations:  - recommend Small bowel follow through  - optimize activity, promote ambulation  - supportive treatment  - advance diet as tolerated  - outpatient GI follow up         Thank you for your consult. We will continue to follow. Please contact GI with any questions or concerns.    Marlene Schuster, AMAYA  Gastroenterology  [facility] ([place])

## 2022-01-14 NOTE — SUBJECTIVE & OBJECTIVE
Interval History: NAEON. Patient doing well, reports improved symptoms since admission. Gastro recommending small bowel follow-through, will discuss results with consultants. Daughter updated to care plan via phone.     Review of Systems   Constitutional: Positive for fatigue. Negative for activity change, appetite change, chills and fever.   Eyes: Negative for photophobia and visual disturbance.   Respiratory: Negative for shortness of breath.    Cardiovascular: Negative for chest pain and palpitations.   Gastrointestinal: Positive for abdominal pain, constipation and nausea. Negative for abdominal distention, diarrhea and vomiting.   Genitourinary: Negative for difficulty urinating and dysuria.   Musculoskeletal: Negative for myalgias.   Skin: Negative for wound.   Psychiatric/Behavioral: Negative for behavioral problems and decreased concentration. The patient is nervous/anxious.    All other systems reviewed and are negative.    Objective:     Vital Signs (Most Recent):  Temp: 98.4 °F (36.9 °C) (01/14/22 1147)  Pulse: 62 (01/14/22 1147)  Resp: 18 (01/14/22 1147)  BP: 130/61 (01/14/22 1147)  SpO2: 98 % (01/14/22 1147) Vital Signs (24h Range):  Temp:  [96.9 °F (36.1 °C)-98.4 °F (36.9 °C)] 98.4 °F (36.9 °C)  Pulse:  [58-65] 62  Resp:  [16-18] 18  SpO2:  [95 %-98 %] 98 %  BP: ()/(46-61) 130/61     Weight: 59.1 kg (130 lb 3.2 oz)  Body mass index is 23.81 kg/m².    Intake/Output Summary (Last 24 hours) at 1/14/2022 1537  Last data filed at 1/14/2022 0446  Gross per 24 hour   Intake --   Output 2200 ml   Net -2200 ml      Physical Exam  Vitals and nursing note reviewed.   Constitutional:       General: She is not in acute distress.  HENT:      Head: Normocephalic and atraumatic.   Eyes:      Extraocular Movements: Extraocular movements intact.   Pulmonary:      Effort: Pulmonary effort is normal. No respiratory distress.   Abdominal:      General: Abdomen is flat. Bowel sounds are decreased.      Palpations:  Abdomen is soft.      Tenderness: There is no abdominal tenderness (RUQ).   Skin:     General: Skin is warm and dry.      Capillary Refill: Capillary refill takes less than 2 seconds.      Coloration: Skin is pale.   Neurological:      General: No focal deficit present.      Mental Status: She is alert and oriented to person, place, and time. Mental status is at baseline.   Psychiatric:         Mood and Affect: Mood normal.         Behavior: Behavior normal.         Thought Content: Thought content normal.         Significant Labs: All pertinent labs within the past 24 hours have been reviewed.    Significant Imaging: I have reviewed all pertinent imaging results/findings within the past 24 hours.

## 2022-01-14 NOTE — PLAN OF CARE
POC reviewed with patient. All questions and concerns reviewed. Safety precautions implemented and maintained. No acute changes noted this shift. Pt VSS and pt denies any further needs. Pt resting in bed in NAD at this time. Bed locked in lowest position. Call bell within reach.        Problem: Adult Inpatient Plan of Care  Goal: Plan of Care Review  Outcome: Ongoing, Progressing  Goal: Absence of Hospital-Acquired Illness or Injury  Outcome: Ongoing, Progressing  Goal: Optimal Comfort and Wellbeing  Outcome: Ongoing, Progressing     Problem: Nausea and Vomiting  Goal: Fluid and Electrolyte Balance  Outcome: Ongoing, Progressing     Problem: Pain Acute  Goal: Acceptable Pain Control and Functional Ability  Outcome: Ongoing, Progressing

## 2022-01-14 NOTE — PROGRESS NOTES
"[facility] ([place] [address])  [facility] Medicine  Progress Note    Patient Name: Anayeli Judd  MRN: [id number]  Patient Class: IP- Inpatient   Admission Date: 1/12/2022  Length of Stay: 1 days  Attending Physician: Salima Joseph MD  Primary Care Provider: Rocio Mc MD        Subjective:     Principal Problem:Generalized abdominal pain        HPI:  Anayeli Judd is a 77 y.o. female with a PMHx of HLD, hypothyroidism, GERD, IBS, DM2, urinary incontinence, colon cancer s/p resection (2010), and multiple SBO s/p ex lap x2 who presents to the ED with complaints of abdominal pain and nausea. The patient reports severe generalized abdominal pain beginning last night and worsening this morning. Described as cramping pain that comes in waves. Denies any aggravating or alleviating factors. The patient does endorse fatigue, decrease appetite, chills and fever Tmax 99.1. Denies any vomiting. The patient notes she has chronic issues with constipation but states her BMs were more regular the last few days. Reports her last BM was this morning and was soft but "formed." Of note the patient [place] [facility] [place] abdominal pain and partial SBO from 1/1-1/4. Her symptoms at that time were attributed to constipation. The patient denies any chest pain, shortness of breath, cough, diarrhea, dysuria, urinary frequency, lightheadedness, or dizziness.    In the ED, VSSAF. CBC with thrombocytopenia which is similar to previous labs. Tbili 1.6, appears elevated at baseline. AST/ALT and alk phos WNL. Lipase WNL. Lactate WNL. UA with 3+ leukocytes, >100 WBCs, and rare bacteria. The patient received dicyclomine, morphine, zofran, and rocephin while in the ED. The ED provider consulted general surgery who evaluated the patient and feel no surgical intervention is needed at this time.      Overview/Hospital Course:  77 y.o. who was admitted to hospital medicine for abdominal " discomfort. CT abdomen/pelvis concerning for partial SBO. Patient was evaluated by general surgery who suspect a functional component to her presentation and recommended gastroenterology consults with aggressive bowel regimen. Gastroenterology evaluated and recommended supportive care with outpatient follow-up. Patient to discharge home with follow-up when medically stable.       Interval History: NAEON. Patient doing well, reports improved symptoms since admission. Gastro recommending small bowel follow-through, will discuss results with consultants. Daughter updated to care plan via phone.     Review of Systems   Constitutional: Positive for fatigue. Negative for activity change, appetite change, chills and fever.   Eyes: Negative for photophobia and visual disturbance.   Respiratory: Negative for shortness of breath.    Cardiovascular: Negative for chest pain and palpitations.   Gastrointestinal: Positive for abdominal pain, constipation and nausea. Negative for abdominal distention, diarrhea and vomiting.   Genitourinary: Negative for difficulty urinating and dysuria.   Musculoskeletal: Negative for myalgias.   Skin: Negative for wound.   Psychiatric/Behavioral: Negative for behavioral problems and decreased concentration. The patient is nervous/anxious.    All other systems reviewed and are negative.    Objective:     Vital Signs (Most Recent):  Temp: 98.4 °F (36.9 °C) (01/14/22 1147)  Pulse: 62 (01/14/22 1147)  Resp: 18 (01/14/22 1147)  BP: 130/61 (01/14/22 1147)  SpO2: 98 % (01/14/22 1147) Vital Signs (24h Range):  Temp:  [96.9 °F (36.1 °C)-98.4 °F (36.9 °C)] 98.4 °F (36.9 °C)  Pulse:  [58-65] 62  Resp:  [16-18] 18  SpO2:  [95 %-98 %] 98 %  BP: ()/(46-61) 130/61     Weight: 59.1 kg (130 lb 3.2 oz)  Body mass index is 23.81 kg/m².    Intake/Output Summary (Last 24 hours) at 1/14/2022 8120  Last data filed at 1/14/2022 0446  Gross per 24 hour   Intake --   Output 2200 ml   Net -2200 ml      Physical  Exam  Vitals and nursing note reviewed.   Constitutional:       General: She is not in acute distress.  HENT:      Head: Normocephalic and atraumatic.   Eyes:      Extraocular Movements: Extraocular movements intact.   Pulmonary:      Effort: Pulmonary effort is normal. No respiratory distress.   Abdominal:      General: Abdomen is flat. Bowel sounds are decreased.      Palpations: Abdomen is soft.      Tenderness: There is no abdominal tenderness (RUQ).   Skin:     General: Skin is warm and dry.      Capillary Refill: Capillary refill takes less than 2 seconds.      Coloration: Skin is pale.   Neurological:      General: No focal deficit present.      Mental Status: She is alert and oriented to person, place, and time. Mental status is at baseline.   Psychiatric:         Mood and Affect: Mood normal.         Behavior: Behavior normal.         Thought Content: Thought content normal.         Significant Labs: All pertinent labs within the past 24 hours have been reviewed.    Significant Imaging: I have reviewed all pertinent imaging results/findings within the past 24 hours.      Assessment/Plan:      * Generalized abdominal pain  76 yo female with chronic abdominal pain, constipation, h/o multiple abdominal surgeries and admission for suspected bowel obstructions. General surgery evaluated patient in the ED and feel her clinical history this episode and exam is inconsistent with bowel obstruction - this episode is most likely a functional problem. Recs include clear liquid diet, bowel regimen, and GI consult.      - CT abd/pelvis reviewed. It looks very similar to CT from 01/02 on last admission. Fecalization of stool within the small bowel consistent with chronic constipation  - Clear liquid diet, advance as tolerated.  -IVF  -PRN antiemetics  -Scheduled tylenol and careful use of PRN narcotics  -Started on bowel regimen of miralax and senna-docusate  -GI consult, appreciate recs    Other hyperlipidemia   Patient  is chronically on statin.will continue for now. Monitor clinically. Last LDL was   Lab Results   Component Value Date    LDLCALC 58.2 (L) 08/04/2020       Constipation  Chronic issue.  -Miralax daily and senna-colace bid    Acute cystitis without hematuria  -UA reviewed, follow cx.   -Started on rocephin in the ED, changed to ciprofloxacin based on previous urine cxs    Component 5 mo ago   Urine Culture, Routine  Abnormal   CITROBACTER FREUNDII   10,000 - 49,999 cfu/ml   No other significant isolate     Resulting Agency [facility]          Susceptibility     Citrobacter freundii     CULTURE, URINE     Amox/K Clav'ate >16/8 mcg/mL Resistant     Amp/Sulbactam >16/8 mcg/mL Resistant     Cefazolin >16 mcg/mL Resistant     Cefepime <=2 mcg/mL Sensitive     Ceftriaxone 32 mcg/mL Resistant     Ciprofloxacin <=1 mcg/mL Sensitive     Ertapenem <=0.5 mcg/mL Sensitive     Gentamicin <=4 mcg/mL Sensitive     Levofloxacin <=2 mcg/mL Sensitive     Meropenem <=1 mcg/mL Sensitive     Nitrofurantoin <=32 mcg/mL Sensitive     Piperacillin/Tazo <=16 mcg/mL Sensitive     Tetracycline <=4 mcg/mL Sensitive     Tobramycin <=4 mcg/mL Sensitive     Trimeth/Sulfa <=2/38 mcg/mL Sensitive                         Mixed stress and urge urinary incontinence  - home mirabegron not on formulary, oxybutynin for now    Type 2 diabetes mellitus without complication, without long-term current use of insulin  Patient's FSGs are controlled on current hypoglycemics.   Last A1c reviewed-   Lab Results   Component Value Date    HGBA1C 6.7 (H) 10/16/2021     Most recent fingerstick glucose reviewed- No results for input(s): POCTGLUCOSE in the last 24 hours.  Current correctional scale  Low  Maintain anti-hyperglycemic dose as follows-   Antihyperglycemics (From admission, onward)            Start     Stop Route Frequency Ordered    01/12/22 2059  insulin aspart U-100 pen 0-5 Units         -- SubQ Before meals & nightly PRN 01/12/22 1959      Accuchecks  AC/HS    Gait instability  -Fall precautions    Partial small bowel obstruction  See generalized abd pain above    GERD (gastroesophageal reflux disease)  -Continue PPI    Hypothyroid  Patient has chronic hypothyroidism. TFTs reviewed-   Lab Results   Component Value Date    TSH 2.432 07/06/2021   . Will continue chronic levothyroxine and adjust for and clinical changes.      VTE Risk Mitigation (From admission, onward)         Ordered     enoxaparin injection 40 mg  Daily         01/12/22 1959     IP VTE HIGH RISK PATIENT  Once         01/12/22 1959     Place sequential compression device  Until discontinued         01/12/22 1959                Discharge Planning   LOI: 1/15/2022     Code Status: Full Code   Is the patient medically ready for discharge?: No    Reason for patient still in hospital (select all that apply): Patient new problem, Imaging and Consult recommendations  Discharge Plan A: Home                  Moisés Vela PA-C  Department of Hospital Medicine   [facility] ([facility])     0 0

## 2022-01-14 NOTE — SUBJECTIVE & OBJECTIVE
Subjective:   GI initially consulted on 1/12 for abdominal pain, partial SBO.    Interval History: reports her last bowel movement was Saturday (5 days ago), however notes indicate last reported BM was 1/12.  She confirms she is passing gas.  Reports abdominal pain that occurs when she is eating.  She is on full liquid diet at this time.  Daughter, Danielle, called on speaker phone during visit.  States every time this has happened, a contrasted exam is what helps her finally have bowel movements.    Review of Systems   Gastrointestinal: Positive for abdominal pain and constipation. Negative for nausea and vomiting.        +flatulence     Objective:     Vital Signs (Most Recent):  Temp: 98.4 °F (36.9 °C) (01/14/22 1147)  Pulse: 62 (01/14/22 1147)  Resp: 18 (01/14/22 1147)  BP: 130/61 (01/14/22 1147)  SpO2: 98 % (01/14/22 1147) Vital Signs (24h Range):  Temp:  [96.9 °F (36.1 °C)-98.4 °F (36.9 °C)] 98.4 °F (36.9 °C)  Pulse:  [58-65] 62  Resp:  [16-18] 18  SpO2:  [95 %-98 %] 98 %  BP: ()/(46-61) 130/61     Weight: 59.1 kg (130 lb 3.2 oz) (01/14/22 0500)  Body mass index is 23.81 kg/m².      Intake/Output Summary (Last 24 hours) at 1/14/2022 1204  Last data filed at 1/14/2022 0446  Gross per 24 hour   Intake --   Output 2200 ml   Net -2200 ml       Lines/Drains/Airways     Peripheral Intravenous Line                 Peripheral IV - Single Lumen 01/12/22 1421 18 G Right Upper Arm 1 day         Peripheral IV - Single Lumen 01/13/22 1005 20 G;1 3/4 in Left Forearm 1 day                Physical Exam  Vitals reviewed.   Constitutional:       General: She is not in acute distress.  HENT:      Head: Normocephalic and atraumatic.   Eyes:      Extraocular Movements: Extraocular movements intact.   Pulmonary:      Effort: Pulmonary effort is normal. No respiratory distress.   Abdominal:      General: Abdomen is flat. Bowel sounds are decreased.      Palpations: Abdomen is soft.      Tenderness: There is abdominal tenderness  (RUQ).   Skin:     General: Skin is warm and dry.      Capillary Refill: Capillary refill takes less than 2 seconds.      Coloration: Skin is pale.   Neurological:      General: No focal deficit present.      Mental Status: She is alert and oriented to person, place, and time. Mental status is at baseline.   Psychiatric:         Mood and Affect: Mood normal.         Behavior: Behavior normal.         Thought Content: Thought content normal.         Significant Labs:  CBC:   Recent Labs   Lab 01/12/22  1419 01/12/22  1429 01/13/22  0323   WBC 6.24  --  5.13   HGB 12.0  --  10.3*   HCT 37.2 35* 32.1*   *  --  88*     CMP:   Recent Labs   Lab 01/13/22  0323   *   CALCIUM 8.8   ALBUMIN 3.1*   PROT 6.0      K 3.7   CO2 24      BUN 11   CREATININE 0.7   ALKPHOS 82   ALT 24   AST 26   BILITOT 1.5*     Coagulation: No results for input(s): PT, INR, APTT in the last 48 hours.  All pertinent lab results from the last 24 hours have been reviewed.      Significant Imaging:  Imaging results within the past 24 hours have been reviewed.

## 2022-01-14 NOTE — SUBJECTIVE & OBJECTIVE
Interval History: NAEON. No BM overnight. Reports improvement in abdominal pain despite this. Tolerating diet.     Medications:  Continuous Infusions:  Scheduled Meds:   acetaminophen  1,000 mg Oral Q8H    atorvastatin  40 mg Oral Daily    azelastine  2 spray Nasal BID    ciprofloxacin  400 mg Intravenous Q12H    DULoxetine  30 mg Oral BID    enoxaparin  40 mg Subcutaneous Daily    levothyroxine  75 mcg Oral Before breakfast    montelukast  10 mg Oral Daily    oxybutynin  10 mg Oral Daily    pantoprazole  20 mg Oral Daily    polyethylene glycol  17 g Oral Daily    senna-docusate 8.6-50 mg  1 tablet Oral BID     PRN Meds:butalbital-acetaminophen-caffeine -40 mg, dextrose 50%, dextrose 50%, diphenhydrAMINE, glucagon (human recombinant), glucose, glucose, insulin aspart U-100, melatonin, morphine, naloxone, ondansetron, oxyCODONE, promethazine (PHENERGAN) IVPB, sodium chloride 0.9%     Review of patient's allergies indicates:   Allergen Reactions    Dilaudid [hydromorphone (bulk)] Other (See Comments)     Oversedating, head burning. Pt prefers to avoid.       Codeine Nausea And Vomiting     Other reaction(s): Itching    Dilaudid [hydromorphone]     Percocet  [oxycodone-acetaminophen]      Other reaction(s): Itching    Sulfa (sulfonamide antibiotics)      Other reaction(s): Nausea  Other reaction(s): Itching     Objective:     Vital Signs (Most Recent):  Temp: 98.4 °F (36.9 °C) (01/14/22 1147)  Pulse: 62 (01/14/22 1147)  Resp: 18 (01/14/22 1147)  BP: 130/61 (01/14/22 1147)  SpO2: 98 % (01/14/22 1147) Vital Signs (24h Range):  Temp:  [96.9 °F (36.1 °C)-98.4 °F (36.9 °C)] 98.4 °F (36.9 °C)  Pulse:  [58-65] 62  Resp:  [16-18] 18  SpO2:  [95 %-98 %] 98 %  BP: ()/(46-61) 130/61     Weight: 59.1 kg (130 lb 3.2 oz)  Body mass index is 23.81 kg/m².    Intake/Output - Last 3 Shifts       01/12 0700 01/13 0659 01/13 0700 01/14 0659 01/14 0700  01/15 0659    P.O. 210      I.V. (mL/kg) 750 (12.7)       IV Piggyback 200      Total Intake(mL/kg) 1160 (19.7)      Urine (mL/kg/hr) 700 2200 (1.6)     Emesis/NG output 0      Total Output 700 2200     Net +460 -2200                  Physical Exam  Vitals and nursing note reviewed.   Constitutional:       Appearance: Normal appearance.   HENT:      Head: Normocephalic and atraumatic.   Cardiovascular:      Rate and Rhythm: Normal rate and regular rhythm.   Pulmonary:      Effort: Pulmonary effort is normal. No respiratory distress.   Abdominal:      General: Abdomen is flat. There is no distension.      Palpations: Abdomen is soft. There is no mass.      Tenderness: There is abdominal tenderness. There is no guarding or rebound.      Hernia: No hernia is present.      Comments: Scars from multiple abdominal surgeries  Mild tenderness to palpation     Skin:     General: Skin is warm and dry.   Neurological:      General: No focal deficit present.      Mental Status: She is alert and oriented to person, place, and time. Mental status is at baseline.   Psychiatric:         Mood and Affect: Mood normal.         Behavior: Behavior normal.          Significant Labs:  I have reviewed all pertinent lab results within the past 24 hours.  CBC:   Recent Labs   Lab 01/13/22  0323   WBC 5.13   RBC 3.25*   HGB 10.3*   HCT 32.1*   PLT 88*   MCV 99*   MCH 31.7*   MCHC 32.1     CMP:   Recent Labs   Lab 01/13/22  0323   *   CALCIUM 8.8   ALBUMIN 3.1*   PROT 6.0      K 3.7   CO2 24      BUN 11   CREATININE 0.7   ALKPHOS 82   ALT 24   AST 26   BILITOT 1.5*       Significant Diagnostics:  I have reviewed all pertinent imaging results/findings within the past 24 hours.  CT: I have reviewed all pertinent results/findings within the past 24 hours and my personal findings are:  CT Abdomen Pelvis with Contrast 01/12:      Segment of dilated small bowel with fecalization of the luminal material measuring up to 3.8 cm with likely transition point in the right lower quadrant as  described above.      Findings likely represent partial small bowel obstruction.  Increased soft tissue and close approximation of the small bowel loops to the anterior abdominal wall likely representing adhesions.     Lateralization of the position of the small bowel in regards to the ascending colon potentially representing internal hernia.     Stable mild right hydronephrosis.     Splenomegaly.     Diffusely hypoattenuating liver suggestive of steatosis.  Consider correlation with LFTs.  Stable nonspecific enhancing focus within the right hepatic dome with potentially flash filling hemangioma.     Increased soft tissue about the celiac axis and SMA as well as multiple mesenteric and olga hepatis nodes.

## 2022-01-15 VITALS
WEIGHT: 131.19 LBS | HEIGHT: 62 IN | OXYGEN SATURATION: 97 % | DIASTOLIC BLOOD PRESSURE: 56 MMHG | RESPIRATION RATE: 18 BRPM | TEMPERATURE: 98 F | BODY MASS INDEX: 24.14 KG/M2 | HEART RATE: 70 BPM | SYSTOLIC BLOOD PRESSURE: 119 MMHG

## 2022-01-15 LAB
ALBUMIN SERPL BCP-MCNC: 3 G/DL (ref 3.5–5.2)
ALP SERPL-CCNC: 92 U/L (ref 55–135)
ALT SERPL W/O P-5'-P-CCNC: 24 U/L (ref 10–44)
ANION GAP SERPL CALC-SCNC: 7 MMOL/L (ref 8–16)
AST SERPL-CCNC: 28 U/L (ref 10–40)
BACTERIA UR CULT: ABNORMAL
BASOPHILS # BLD AUTO: 0.03 K/UL (ref 0–0.2)
BASOPHILS NFR BLD: 0.9 % (ref 0–1.9)
BILIRUB SERPL-MCNC: 0.9 MG/DL (ref 0.1–1)
BUN SERPL-MCNC: 7 MG/DL (ref 8–23)
CALCIUM SERPL-MCNC: 8.9 MG/DL (ref 8.7–10.5)
CHLORIDE SERPL-SCNC: 106 MMOL/L (ref 95–110)
CO2 SERPL-SCNC: 23 MMOL/L (ref 23–29)
CREAT SERPL-MCNC: 0.7 MG/DL (ref 0.5–1.4)
DIFFERENTIAL METHOD: ABNORMAL
EOSINOPHIL # BLD AUTO: 0.2 K/UL (ref 0–0.5)
EOSINOPHIL NFR BLD: 7.3 % (ref 0–8)
ERYTHROCYTE [DISTWIDTH] IN BLOOD BY AUTOMATED COUNT: 13.7 % (ref 11.5–14.5)
EST. GFR  (AFRICAN AMERICAN): >60 ML/MIN/1.73 M^2
EST. GFR  (NON AFRICAN AMERICAN): >60 ML/MIN/1.73 M^2
GLUCOSE SERPL-MCNC: 140 MG/DL (ref 70–110)
HCT VFR BLD AUTO: 30.7 % (ref 37–48.5)
HGB BLD-MCNC: 10 G/DL (ref 12–16)
IMM GRANULOCYTES # BLD AUTO: 0.01 K/UL (ref 0–0.04)
IMM GRANULOCYTES NFR BLD AUTO: 0.3 % (ref 0–0.5)
LYMPHOCYTES # BLD AUTO: 1.3 K/UL (ref 1–4.8)
LYMPHOCYTES NFR BLD: 39.7 % (ref 18–48)
MAGNESIUM SERPL-MCNC: 1.4 MG/DL (ref 1.6–2.6)
MCH RBC QN AUTO: 32.2 PG (ref 27–31)
MCHC RBC AUTO-ENTMCNC: 32.6 G/DL (ref 32–36)
MCV RBC AUTO: 99 FL (ref 82–98)
MONOCYTES # BLD AUTO: 0.3 K/UL (ref 0.3–1)
MONOCYTES NFR BLD: 10.4 % (ref 4–15)
NEUTROPHILS # BLD AUTO: 1.3 K/UL (ref 1.8–7.7)
NEUTROPHILS NFR BLD: 41.4 % (ref 38–73)
NRBC BLD-RTO: 0 /100 WBC
PLATELET # BLD AUTO: 77 K/UL (ref 150–450)
PMV BLD AUTO: 10.8 FL (ref 9.2–12.9)
POCT GLUCOSE: 120 MG/DL (ref 70–110)
POCT GLUCOSE: 230 MG/DL (ref 70–110)
POCT GLUCOSE: 283 MG/DL (ref 70–110)
POTASSIUM SERPL-SCNC: 3.3 MMOL/L (ref 3.5–5.1)
PROT SERPL-MCNC: 5.8 G/DL (ref 6–8.4)
RBC # BLD AUTO: 3.11 M/UL (ref 4–5.4)
SODIUM SERPL-SCNC: 136 MMOL/L (ref 136–145)
WBC # BLD AUTO: 3.17 K/UL (ref 3.9–12.7)

## 2022-01-15 PROCEDURE — 63600175 PHARM REV CODE 636 W HCPCS: Performed by: NURSE PRACTITIONER

## 2022-01-15 PROCEDURE — 83735 ASSAY OF MAGNESIUM: CPT | Performed by: NURSE PRACTITIONER

## 2022-01-15 PROCEDURE — 25000003 PHARM REV CODE 250: Performed by: PHYSICIAN ASSISTANT

## 2022-01-15 PROCEDURE — 99239 HOSP IP/OBS DSCHRG MGMT >30: CPT | Mod: ,,, | Performed by: PHYSICIAN ASSISTANT

## 2022-01-15 PROCEDURE — 25000003 PHARM REV CODE 250: Performed by: NURSE PRACTITIONER

## 2022-01-15 PROCEDURE — 36415 COLL VENOUS BLD VENIPUNCTURE: CPT | Performed by: NURSE PRACTITIONER

## 2022-01-15 PROCEDURE — 63600175 PHARM REV CODE 636 W HCPCS: Performed by: PHYSICIAN ASSISTANT

## 2022-01-15 PROCEDURE — 80053 COMPREHEN METABOLIC PANEL: CPT | Performed by: NURSE PRACTITIONER

## 2022-01-15 PROCEDURE — 85025 COMPLETE CBC W/AUTO DIFF WBC: CPT | Performed by: NURSE PRACTITIONER

## 2022-01-15 PROCEDURE — 99239 PR HOSPITAL DISCHARGE DAY,>30 MIN: ICD-10-PCS | Mod: ,,, | Performed by: PHYSICIAN ASSISTANT

## 2022-01-15 RX ORDER — AMOXICILLIN 500 MG/1
500 CAPSULE ORAL EVERY 12 HOURS
Status: DISCONTINUED | OUTPATIENT
Start: 2022-01-15 | End: 2022-01-15 | Stop reason: HOSPADM

## 2022-01-15 RX ORDER — LANOLIN ALCOHOL/MO/W.PET/CERES
800 CREAM (GRAM) TOPICAL DAILY
Status: DISCONTINUED | OUTPATIENT
Start: 2022-01-15 | End: 2022-01-15 | Stop reason: HOSPADM

## 2022-01-15 RX ORDER — AMOXICILLIN 250 MG
1 CAPSULE ORAL 2 TIMES DAILY
Qty: 60 TABLET | Refills: 2 | Status: ON HOLD | OUTPATIENT
Start: 2022-01-15 | End: 2022-02-12 | Stop reason: SDUPTHER

## 2022-01-15 RX ORDER — AMOXICILLIN 500 MG/1
500 CAPSULE ORAL EVERY 12 HOURS
Qty: 14 CAPSULE | Refills: 0 | Status: SHIPPED | OUTPATIENT
Start: 2022-01-15 | End: 2022-01-22

## 2022-01-15 RX ORDER — MAGNESIUM SULFATE HEPTAHYDRATE 40 MG/ML
2 INJECTION, SOLUTION INTRAVENOUS ONCE
Status: COMPLETED | OUTPATIENT
Start: 2022-01-15 | End: 2022-01-15

## 2022-01-15 RX ORDER — VANCOMYCIN HCL IN 5 % DEXTROSE 1G/250ML
1000 PLASTIC BAG, INJECTION (ML) INTRAVENOUS
Status: DISCONTINUED | OUTPATIENT
Start: 2022-01-15 | End: 2022-01-15

## 2022-01-15 RX ORDER — POTASSIUM CHLORIDE 20 MEQ/1
40 TABLET, EXTENDED RELEASE ORAL EVERY 4 HOURS
Status: COMPLETED | OUTPATIENT
Start: 2022-01-15 | End: 2022-01-15

## 2022-01-15 RX ADMIN — SENNOSIDES AND DOCUSATE SODIUM 1 TABLET: 50; 8.6 TABLET ORAL at 10:01

## 2022-01-15 RX ADMIN — PANTOPRAZOLE SODIUM 20 MG: 20 TABLET, DELAYED RELEASE ORAL at 10:01

## 2022-01-15 RX ADMIN — MAGNESIUM SULFATE 2 G: 2 INJECTION INTRAVENOUS at 01:01

## 2022-01-15 RX ADMIN — ATORVASTATIN CALCIUM 40 MG: 40 TABLET, FILM COATED ORAL at 10:01

## 2022-01-15 RX ADMIN — POTASSIUM CHLORIDE 40 MEQ: 1500 TABLET, EXTENDED RELEASE ORAL at 01:01

## 2022-01-15 RX ADMIN — Medication 1 CAPSULE: at 10:01

## 2022-01-15 RX ADMIN — LEVOTHYROXINE SODIUM 75 MCG: 75 TABLET ORAL at 05:01

## 2022-01-15 RX ADMIN — AZELASTINE HYDROCHLORIDE 274 MCG: 137 SPRAY, METERED NASAL at 10:01

## 2022-01-15 RX ADMIN — Medication 800 MG: at 01:01

## 2022-01-15 RX ADMIN — MONTELUKAST 10 MG: 10 TABLET, FILM COATED ORAL at 10:01

## 2022-01-15 RX ADMIN — DULOXETINE 30 MG: 30 CAPSULE, DELAYED RELEASE ORAL at 10:01

## 2022-01-15 RX ADMIN — VANCOMYCIN HYDROCHLORIDE 1000 MG: 1 INJECTION, POWDER, LYOPHILIZED, FOR SOLUTION INTRAVENOUS at 10:01

## 2022-01-15 RX ADMIN — ACETAMINOPHEN 1000 MG: 500 TABLET ORAL at 05:01

## 2022-01-15 RX ADMIN — INSULIN ASPART 3 UNITS: 100 INJECTION, SOLUTION INTRAVENOUS; SUBCUTANEOUS at 01:01

## 2022-01-15 RX ADMIN — OXYBUTYNIN CHLORIDE 10 MG: 10 TABLET, EXTENDED RELEASE ORAL at 10:01

## 2022-01-15 RX ADMIN — POTASSIUM CHLORIDE 40 MEQ: 1500 TABLET, EXTENDED RELEASE ORAL at 10:01

## 2022-01-15 RX ADMIN — CIPROFLOXACIN 400 MG: 2 INJECTION, SOLUTION INTRAVENOUS at 01:01

## 2022-01-15 RX ADMIN — ACETAMINOPHEN 1000 MG: 500 TABLET ORAL at 01:01

## 2022-01-15 NOTE — PROGRESS NOTES
Discharge instructions reviewed with patient. Questions encouraged. All questions answered. VSS. IV removed. Telemetry monitor removed. Patient in room awaiting transport.

## 2022-01-15 NOTE — CONSULTS
Pharmacokinetic Initial Assessment: IV Vancomycin    Assessment/Plan:    Initiate vancomycin 1000 mg IV every 24 hours.  Desired empiric serum trough concentration is 10 to 15 mcg/mL.  Draw vancomycin trough level 60 min prior to third dose on 1/17 at approximately 0900.  Pharmacy will continue to follow and monitor vancomycin.      Please contact pharmacy at extension [phone] with any questions regarding this assessment.     Thank you for the consult,   Elle Chávez       Patient brief summary:  Anayeli Judd is a 77 y.o. female initiated on antimicrobial therapy with IV Vancomycin for treatment of suspected urinary tract infection.    Drug Allergies:   Review of patient's allergies indicates:   Allergen Reactions    Dilaudid [hydromorphone (bulk)] Other (See Comments)     Oversedating, head burning. Pt prefers to avoid.       Codeine Nausea And Vomiting     Other reaction(s): Itching    Dilaudid [hydromorphone]     Percocet  [oxycodone-acetaminophen]      Other reaction(s): Itching    Sulfa (sulfonamide antibiotics)      Other reaction(s): Nausea  Other reaction(s): Itching       Actual Body Weight:   59.5 kg    Renal Function:   Estimated Creatinine Clearance: 51.5 mL/min (based on SCr of 0.7 mg/dL).,     Dialysis Method (if applicable):  N/A    CBC (last 72 hours):  Recent Labs   Lab Result Units 01/12/22  1419 01/13/22  0323 01/15/22  0329   WBC K/uL 6.24 5.13 3.17*   Hemoglobin g/dL 12.0 10.3* 10.0*   Hematocrit % 37.2 32.1* 30.7*   Platelets K/uL 104* 88* 77*   Gran % % 66.5 62.5 41.4   Lymph % % 20.0 19.5 39.7   Mono % % 9.0 12.3 10.4   Eosinophil % % 3.7 4.9 7.3   Basophil % % 0.5 0.6 0.9   Differential Method  Automated Automated Automated       Metabolic Panel (last 72 hours):  Recent Labs   Lab Result Units 01/12/22  1419 01/12/22  1428 01/13/22 0323 01/15/22  0329   Sodium mmol/L 134*  --  138 136   Potassium mmol/L 4.1  --  3.7 3.3*   Chloride mmol/L 101  --  105 106   CO2 mmol/L 27  --  24 23    Glucose mg/dL 148*  --  154* 140*   Glucose, UA   --  Negative  --   --    BUN mg/dL 13  --  11 7*   Creatinine mg/dL 0.8  --  0.7 0.7   Albumin g/dL 3.7  --  3.1* 3.0*   Total Bilirubin mg/dL 1.6*  --  1.5* 0.9   Alkaline Phosphatase U/L 89  --  82 92   AST U/L 26  --  26 28   ALT U/L 24  --  24 24   Magnesium mg/dL  --   --  1.6 1.4*       Drug levels (last 3 results):  No results for input(s): VANCOMYCINRA, VANCOMYCINPE, VANCOMYCINTR in the last 72 hours.    Microbiologic Results:  Microbiology Results (last 7 days)       Procedure Component Value Units Date/Time    Urine culture [[id number]]  (Abnormal)  (Susceptibility) Collected: 01/12/22 1428    Order Status: Completed Specimen: Urine Updated: 01/15/22 0022     Urine Culture, Routine ENTEROCOCCUS FAECALIS  > 100,000 cfu/ml  No other significant isolate      Narrative:      Specimen Source->Urine

## 2022-01-15 NOTE — ASSESSMENT & PLAN NOTE
78 yo female with chronic abdominal pain, constipation, h/o multiple abdominal surgeries and admission for suspected bowel obstructions. General surgery evaluated patient in the ED and feel her clinical history this episode and exam is inconsistent with bowel obstruction - this episode is most likely a functional problem. Recs include clear liquid diet, bowel regimen, and GI consult.      - small bowel follow through without obstruction  - CT abd/pelvis reviewed. It looks very similar to CT from 01/02 on last admission. Fecalization of stool within the small bowel consistent with chronic constipation  - tolerating PO diet, regular   - IVF, PRN antiemetics  - Scheduled tylenol and careful use of PRN narcotics  - Started on bowel regimen of miralax and senna-docusate  -GI consult, appreciate recs  - recommend Small bowel follow through  - optimize activity, promote ambulation  - supportive treatment  - advance diet as tolerated  - outpatient GI follow up   -General surgery consult, appreciate recs      - Recommend bowel regiment - enemas, laxatives, fiber      - Pro-motility agents may assist, could consider antiflatulents, bentyl, etc per GI.      - Recommend several small meals per day (6 small meals) as opposed to 3 larger meals would likely be beneficial.       - Surgery is not indicated and will not be beneficial for Mrs. Judd.

## 2022-01-15 NOTE — PROGRESS NOTES
"[facility] ([place] [address])  [facility]  Progress Note    Patient Name: Anayeli Judd  MRN: [id number]  Patient Class: IP- Inpatient   Admission Date: 1/12/2022  Length of Stay: 2 days  Attending Physician: Salima Joseph MD  Primary Care Provider: Rocio Mc MD        Subjective:     Principal Problem:Generalized abdominal pain        HPI:  Anayeli Judd is a 77 y.o. female with a PMHx of HLD, hypothyroidism, GERD, IBS, DM2, urinary incontinence, colon cancer s/p resection (2010), and multiple SBO s/p ex lap x2 who presents to the ED with complaints of abdominal pain and nausea. The patient reports severe generalized abdominal pain beginning last night and worsening this morning. Described as cramping pain that comes in waves. Denies any aggravating or alleviating factors. The patient does endorse fatigue, decrease appetite, chills and fever Tmax 99.1. Denies any vomiting. The patient notes she has chronic issues with constipation but states her BMs were more regular the last few days. Reports her last BM was this morning and was soft but "formed." Of note the patient was recently admitted at [facility] [place] surgery for abdominal pain and partial SBO from 1/1-1/4. Her symptoms at that time were attributed to constipation. The patient denies any chest pain, shortness of breath, cough, diarrhea, dysuria, urinary frequency, lightheadedness, or dizziness.    In the ED, VSSAF. CBC with thrombocytopenia which is similar to previous labs. Tbili 1.6, appears elevated at baseline. AST/ALT and alk phos WNL. Lipase WNL. Lactate WNL. UA with 3+ leukocytes, >100 WBCs, and rare bacteria. The patient received dicyclomine, morphine, zofran, and rocephin while in the ED. The ED provider consulted general surgery who evaluated the patient and feel no surgical intervention is needed at this time.      Overview/Hospital Course:  77 y.o. who was admitted to hospital medicine for abdominal " discomfort. CT abdomen/pelvis concerning for partial SBO. Patient was evaluated by general surgery who suspect a functional component to her presentation and recommended gastroenterology consults with aggressive bowel regimen. Gastroenterology evaluated and recommended small bowel follow-through which was without obstruction therefore supportive care with outpatient follow-up. Urine culture grew enterococcus, antibiotics adjusted per sensitivities. Patient to discharge home with follow-up when medically stable.       Interval History: Patient and nurse report episodes of diarrhea following the small bowel imaging on yesterday. Mild hypokalemia noted on labs today, will supplement. If diarrhea continues will r/o infectious source. Patient reports improved symptoms since admission, tolerating regular diet. Urine culture with enterococcus, discussed with ID, will start PO amoxicillin.    Review of Systems   Constitutional: Positive for fatigue. Negative for activity change, appetite change, chills and fever.   Eyes: Negative for photophobia and visual disturbance.   Respiratory: Negative for shortness of breath.    Cardiovascular: Negative for chest pain and palpitations.   Gastrointestinal: Positive for abdominal pain, constipation and nausea. Negative for abdominal distention, diarrhea and vomiting.   Genitourinary: Negative for difficulty urinating and dysuria.   Musculoskeletal: Negative for myalgias.   Skin: Negative for wound.   Psychiatric/Behavioral: Negative for behavioral problems and decreased concentration. The patient is nervous/anxious.    All other systems reviewed and are negative.    Objective:     Vital Signs (Most Recent):  Temp: 97.7 °F (36.5 °C) (01/15/22 1120)  Pulse: 63 (01/15/22 1120)  Resp: 18 (01/15/22 1120)  BP: 135/60 (01/15/22 1120)  SpO2: 98 % (01/15/22 1120) Vital Signs (24h Range):  Temp:  [97.4 °F (36.3 °C)-98.3 °F (36.8 °C)] 97.7 °F (36.5 °C)  Pulse:  [62-67] 63  Resp:  [16-18]  18  SpO2:  [96 %-100 %] 98 %  BP: (109-135)/(52-67) 135/60     Weight: 59.5 kg (131 lb 2.8 oz)  Body mass index is 23.99 kg/m².    Intake/Output Summary (Last 24 hours) at 1/15/2022 1307  Last data filed at 1/15/2022 0446  Gross per 24 hour   Intake --   Output 400 ml   Net -400 ml      Physical Exam  Vitals and nursing note reviewed.   Constitutional:       General: She is not in acute distress.  HENT:      Head: Normocephalic and atraumatic.   Eyes:      Extraocular Movements: Extraocular movements intact.   Pulmonary:      Effort: Pulmonary effort is normal. No respiratory distress.   Abdominal:      General: Abdomen is flat. Bowel sounds are decreased.      Palpations: Abdomen is soft.      Tenderness: There is abdominal tenderness (suprapubic, lower quadrants).   Musculoskeletal:      Cervical back: Normal range of motion.   Skin:     General: Skin is warm and dry.      Capillary Refill: Capillary refill takes less than 2 seconds.      Coloration: Skin is pale.   Neurological:      General: No focal deficit present.      Mental Status: She is alert and oriented to person, place, and time. Mental status is at baseline.   Psychiatric:         Mood and Affect: Mood normal.         Behavior: Behavior normal.         Thought Content: Thought content normal.         Significant Labs: All pertinent labs within the past 24 hours have been reviewed.    Significant Imaging: I have reviewed all pertinent imaging results/findings within the past 24 hours.      Assessment/Plan:      * Generalized abdominal pain  78 yo female with chronic abdominal pain, constipation, h/o multiple abdominal surgeries and admission for suspected bowel obstructions. General surgery evaluated patient in the ED and feel her clinical history this episode and exam is inconsistent with bowel obstruction - this episode is most likely a functional problem. Recs include clear liquid diet, bowel regimen, and GI consult.      - small bowel follow  through without obstruction  - CT abd/pelvis reviewed. It looks very similar to CT from 01/02 on last admission. Fecalization of stool within the small bowel consistent with chronic constipation  - tolerating PO diet, regular   - IVF, PRN antiemetics  - Scheduled tylenol and careful use of PRN narcotics  - Started on bowel regimen of miralax and senna-docusate  -GI consult, appreciate recs  - recommend Small bowel follow through  - optimize activity, promote ambulation  - supportive treatment  - advance diet as tolerated  - outpatient GI follow up   -General surgery consult, appreciate recs      - Recommend bowel regiment - enemas, laxatives, fiber      - Pro-motility agents may assist, could consider antiflatulents, bentyl, etc per GI.      - Recommend several small meals per day (6 small meals) as opposed to 3 larger meals would likely be beneficial.       - Surgery is not indicated and will not be beneficial for Mrs. Judd.     Acute cystitis without hematuria  - recieved rocephin in the ED, changed to ciprofloxacin based on previous urine cxs  - vancomycin x 1 today, started PO Amoxicillin BID per ID curbside   - urine culture with ENTEROCOCCUS FAECALIS     Type 2 diabetes mellitus without complication, without long-term current use of insulin  Lab Results   Component Value Date    HGBA1C 6.7 (H) 10/16/2021     - controlled  - low dose SSI, ACHS acchuchecks  - Diabetic diet 2000 calories   - monitor for hypoglycemia    Other hyperlipidemia   Patient is chronically on statin.will continue for now. Monitor clinically. Last LDL was   Lab Results   Component Value Date    LDLCALC 58.2 (L) 08/04/2020       Constipation  Chronic issue.  -Miralax daily and senna-colace bid    Mixed stress and urge urinary incontinence  - home mirabegron not on formulary, oxybutynin for now    Gait instability  -Fall precautions    Partial small bowel obstruction  See generalized abd pain above    GERD (gastroesophageal reflux  disease)  -Continue PPI    Hypothyroid  Patient has chronic hypothyroidism. TFTs reviewed-   Lab Results   Component Value Date    TSH 2.432 07/06/2021   . Will continue chronic levothyroxine and adjust for and clinical changes.    VTE Risk Mitigation (From admission, onward)         Ordered     enoxaparin injection 40 mg  Daily         01/12/22 1959     IP VTE HIGH RISK PATIENT  Once         01/12/22 1959     Place sequential compression device  Until discontinued         01/12/22 1959                Discharge Planning   LOI: 1/15/2022     Code Status: Full Code   Is the patient medically ready for discharge?: Yes    Reason for patient still in hospital (select all that apply): Patient trending condition and Treatment  Discharge Plan A: Home                  Moisés Vela PA-C  Department of Hospital Medicine   [facility] ([place])

## 2022-01-15 NOTE — SUBJECTIVE & OBJECTIVE
Interval History: Patient and nurse report episodes of diarrhea following the small bowel imaging on yesterday. Mild hypokalemia noted on labs today, will supplement. If diarrhea continues will r/o infectious source. Patient reports improved symptoms since admission, tolerating regular diet. Urine culture with enterococcus, discussed with ID, will start PO amoxicillin.    Review of Systems   Constitutional: Positive for fatigue. Negative for activity change, appetite change, chills and fever.   Eyes: Negative for photophobia and visual disturbance.   Respiratory: Negative for shortness of breath.    Cardiovascular: Negative for chest pain and palpitations.   Gastrointestinal: Positive for abdominal pain, constipation and nausea. Negative for abdominal distention, diarrhea and vomiting.   Genitourinary: Negative for difficulty urinating and dysuria.   Musculoskeletal: Negative for myalgias.   Skin: Negative for wound.   Psychiatric/Behavioral: Negative for behavioral problems and decreased concentration. The patient is nervous/anxious.    All other systems reviewed and are negative.    Objective:     Vital Signs (Most Recent):  Temp: 97.7 °F (36.5 °C) (01/15/22 1120)  Pulse: 63 (01/15/22 1120)  Resp: 18 (01/15/22 1120)  BP: 135/60 (01/15/22 1120)  SpO2: 98 % (01/15/22 1120) Vital Signs (24h Range):  Temp:  [97.4 °F (36.3 °C)-98.3 °F (36.8 °C)] 97.7 °F (36.5 °C)  Pulse:  [62-67] 63  Resp:  [16-18] 18  SpO2:  [96 %-100 %] 98 %  BP: (109-135)/(52-67) 135/60     Weight: 59.5 kg (131 lb 2.8 oz)  Body mass index is 23.99 kg/m².    Intake/Output Summary (Last 24 hours) at 1/15/2022 1307  Last data filed at 1/15/2022 0446  Gross per 24 hour   Intake --   Output 400 ml   Net -400 ml      Physical Exam  Vitals and nursing note reviewed.   Constitutional:       General: She is not in acute distress.  HENT:      Head: Normocephalic and atraumatic.   Eyes:      Extraocular Movements: Extraocular movements intact.   Pulmonary:       Effort: Pulmonary effort is normal. No respiratory distress.   Abdominal:      General: Abdomen is flat. Bowel sounds are decreased.      Palpations: Abdomen is soft.      Tenderness: There is abdominal tenderness (suprapubic, lower quadrants).   Musculoskeletal:      Cervical back: Normal range of motion.   Skin:     General: Skin is warm and dry.      Capillary Refill: Capillary refill takes less than 2 seconds.      Coloration: Skin is pale.   Neurological:      General: No focal deficit present.      Mental Status: She is alert and oriented to person, place, and time. Mental status is at baseline.   Psychiatric:         Mood and Affect: Mood normal.         Behavior: Behavior normal.         Thought Content: Thought content normal.         Significant Labs: All pertinent labs within the past 24 hours have been reviewed.    Significant Imaging: I have reviewed all pertinent imaging results/findings within the past 24 hours.

## 2022-01-15 NOTE — ASSESSMENT & PLAN NOTE
- recieved rocephin in the ED, changed to ciprofloxacin based on previous urine cxs  - vancomycin x 1 today, started PO Amoxicillin BID per ID curbside   - urine culture with ENTEROCOCCUS FAECALIS

## 2022-01-15 NOTE — PROGRESS NOTES
Anayeli Judd [id number] is a 77 y.o. female who had been consulted for vancomycin dosing.    Vancomycin has been discontinued.  Pharmacy consult for vancomycin dosing is no longer required.    Thank you for allowing us to participate in this patient's care.     Elle Chávez

## 2022-01-15 NOTE — NURSING
No acute events during shift. VSS. Fall and safety precautions maintained. POC discussed with patient. Will continue to monitor.

## 2022-01-15 NOTE — ASSESSMENT & PLAN NOTE
Lab Results   Component Value Date    HGBA1C 6.7 (H) 10/16/2021     - controlled  - low dose SSI, ACHS acchuchecks  - Diabetic diet 2000 calories   - monitor for hypoglycemia

## 2022-01-16 NOTE — DISCHARGE SUMMARY
"[facility] - Telemetry Stepdown ([place] [address])  [facility] Medicine  Discharge Summary      Patient Name: Anayeli Judd  MRN: [id number]  Patient Class: IP- Inpatient  Admission Date: 1/12/2022  Hospital Length of Stay: 2 days  Discharge Date and Time: No discharge date for patient encounter.  Attending Physician: Salima Joseph MD   Discharging Provider: Moisés Vela PA-C  Primary Care Provider: Rocio Mc MD      HPI:   Anayeli Judd is a 77 y.o. female with a PMHx of HLD, hypothyroidism, GERD, IBS, DM2, urinary incontinence, colon cancer s/p resection (2010), and multiple SBO s/p ex lap x2 who presents to the ED with complaints of abdominal pain and nausea. The patient reports severe generalized abdominal pain beginning last night and worsening this morning. Described as cramping pain that comes in waves. Denies any aggravating or alleviating factors. The patient does endorse fatigue, decrease appetite, chills and fever Tmax 99.1. Denies any vomiting. The patient notes she has chronic issues with constipation but states her BMs were more regular the last few days. Reports her last BM was this morning and was soft but "formed." Of note the patient was recently admitted at [facility] under general surgery for abdominal pain and partial SBO from 1/1-1/4. Her symptoms at that time were attributed to constipation. The patient denies any chest pain, shortness of breath, cough, diarrhea, dysuria, urinary frequency, lightheadedness, or dizziness.    In the ED, VSSAF. CBC with thrombocytopenia which is similar to previous labs. Tbili 1.6, appears elevated at baseline. AST/ALT and alk phos WNL. Lipase WNL. Lactate WNL. UA with 3+ leukocytes, >100 WBCs, and rare bacteria. The patient received dicyclomine, morphine, zofran, and rocephin while in the ED. The ED provider consulted general surgery who evaluated the patient and feel no surgical intervention is needed at this time.      * No surgery found *  "     Hospital Course:   77 y.o. who was admitted to hospital medicine for abdominal discomfort. CT abdomen/pelvis concerning for partial SBO. Patient was evaluated by general surgery who suspect a functional component to her presentation and recommended gastroenterology consults with aggressive bowel regimen. Gastroenterology evaluated and recommended small bowel follow-through which was without obstruction therefore supportive care with outpatient follow-up. Urine culture grew enterococcus, antibiotics adjusted per sensitivities and ID recs. Patient tolerated PO diet without difficulty. Discharged home in stable condition.        Goals of Care Treatment Preferences:  Code Status: Full Code    Living Will: Yes              Consults:   Consults (From admission, onward)        Status Ordering Provider     Inpatient consult to PICC team ([facility])  Once        Provider:  (Not yet assigned)    Completed DAVID DEMARCO     Inpatient consult to Gastroenterology  Once        Provider:  (Not yet assigned)    Completed ION HATHAWAY     Inpatient consult to General Surgery  Once        Provider:  (Not yet assigned)    Completed BLAYNE YANES          * Generalized abdominal pain  78 yo female with chronic abdominal pain, constipation, h/o multiple abdominal surgeries and admission for suspected bowel obstructions. General surgery evaluated patient in the ED and feel her clinical history this episode and exam is inconsistent with bowel obstruction - this episode is most likely a functional problem. Recs include clear liquid diet, bowel regimen, and GI consult.      - small bowel follow through without obstruction  - CT abd/pelvis reviewed. It looks very similar to CT from 01/02 on last admission. Fecalization of stool within the small bowel consistent with chronic constipation  - tolerating PO diet, regular   - IVF, PRN antiemetics  - Scheduled tylenol and careful use of PRN narcotics  - Started on bowel regimen of  miralax and senna-docusate  -GI consult, appreciate recs  - recommend Small bowel follow through  - optimize activity, promote ambulation  - supportive treatment  - advance diet as tolerated  - outpatient GI follow up   -General surgery consult, appreciate recs      - Recommend bowel regiment - enemas, laxatives, fiber      - Pro-motility agents may assist, could consider antiflatulents, bentyl, etc per GI.      - Recommend several small meals per day (6 small meals) as opposed to 3 larger meals would likely be beneficial.       - Surgery is not indicated and will not be beneficial for Mrs. Judd.     Acute cystitis without hematuria  - recieved rocephin in the ED, changed to ciprofloxacin based on previous urine cxs  - vancomycin x 1 today, started PO Amoxicillin BID per ID curbside   - urine culture with ENTEROCOCCUS FAECALIS     Type 2 diabetes mellitus without complication, without long-term current use of insulin  Lab Results   Component Value Date    HGBA1C 6.7 (H) 10/16/2021     - controlled  - low dose SSI, ACHS acchuchecks  - Diabetic diet 2000 calories   - monitor for hypoglycemia      Final Active Diagnoses:    Diagnosis Date Noted POA    PRINCIPAL PROBLEM:  Generalized abdominal pain [R10.84] 04/17/2018 Yes    Acute cystitis without hematuria [N30.00] 11/16/2018 Yes    Type 2 diabetes mellitus without complication, without long-term current use of insulin [E11.9] 05/05/2015 Yes     Chronic    Other hyperlipidemia [E78.49] 11/10/2021 Yes    Constipation [K59.00]  Yes    Mixed stress and urge urinary incontinence [N39.46] 06/30/2018 Yes    Gait instability [R26.81] 03/24/2015 Yes    Partial small bowel obstruction [K56.600] 09/01/2014 Yes    GERD (gastroesophageal reflux disease) [K21.9]  Yes    Hypothyroid [E03.9] 01/11/2013 Yes      Problems Resolved During this Admission:       Discharged Condition: good    Disposition: Home or Self Care    Follow Up:    Patient Instructions:      Ambulatory  referral/consult to [facility]   Standing Status: Future   Referral Priority: Urgent Referral Type: Consultation   Referral Reason: Specialty Services Required   Number of Visits Requested: 1     Diet Cardiac     Notify your health care provider if you experience any of the following:  persistent nausea and vomiting or diarrhea     Notify your health care provider if you experience any of the following:  severe uncontrolled pain     Activity as tolerated       Significant Diagnostic Studies: Labs: All labs within the past 24 hours have been reviewed    Pending Diagnostic Studies:     None         Medications:  Reconciled Home Medications:      Medication List      START taking these medications    amoxicillin 500 MG capsule  Commonly known as: AMOXIL  Take 1 capsule (500 mg total) by mouth every 12 (twelve) hours. for 7 days     Lactobacillus rhamnosus GG 10 billion cell capsule  Commonly known as: CULTURELLE  Take 1 capsule by mouth once daily.  Start taking on: 2022     psyllium husk (aspartame) 3.4 gram Pwpk packet  Commonly known as: METAMUCIL  Take 1 packet by mouth 2 (two) times daily.     senna-docusate 8.6-50 mg 8.6-50 mg per tablet  Commonly known as: PERICOLACE  Take 1 tablet by mouth 2 (two) times daily.        CONTINUE taking these medications    ascorbic Acid 500 mg Cpsr  Commonly known as: VITAMIN C  Take 500 mg by mouth once daily.     atorvastatin 40 MG tablet  Commonly known as: LIPITOR  TAKE 1 TABLET BY MOUTH  DAILY     azelastine 137 mcg (0.1 %) nasal spray  Commonly known as: ASTELIN  2 sprays (274 mcg total) by Nasal route 2 (two) times daily.     biotin 5,000 mcg Tbdl  Take 1 tablet by mouth once daily.     butalbital-acetaminophen-caffeine -40 mg -40 mg per tablet  Commonly known as: FIORICET, ESGIC  SMARTSI Tablet(s) By Mouth 1 to 3 Times Daily PRN     cholestyramine-aspartame 4 gram Pwpk  Commonly known as: CHOLESTYRAMINE  LIGHT  Take 1 packet (4 g total) by mouth daily as needed (Diarrhea).     clotrimazole-betamethasone 1-0.05% cream  Commonly known as: LOTRISONE  Apply topically 2 (two) times daily.     co-enzyme Q-10 30 mg capsule  Take 30 mg by mouth 3 (three) times daily.     cranberry extract 200 mg Cap  Take 1 capsule by mouth once daily.     D-MANNOSE ORAL  Take 1 tablet by mouth once daily.     DULoxetine 30 MG capsule  Commonly known as: CYMBALTA  Take 1 capsule (30 mg total) by mouth 2 (two) times daily.     FREESTYLE EFREN 10 DAY READER Misc  Generic drug: flash glucose scanning reader  TEST as directed     FREESTYLE EFREN 14 DAY SENSOR Kit  Generic drug: flash glucose sensor  1 application by Misc.(Non-Drug; Combo Route) route every 14 (fourteen) days. Disp 30 or 90 day refill     FREESTYLE LITE STRIPS Strp  Generic drug: blood sugar diagnostic  TEST DAILY AS DIRECTED     hydrocortisone 2.5 % cream  Apply topically 2 (two) times daily as needed.     levothyroxine 75 MCG tablet  Commonly known as: SYNTHROID  Take 1 tablet (75 mcg total) by mouth before breakfast.     magnesium 30 mg Tab  Take 500 capsules by mouth.     metFORMIN 500 MG tablet  Commonly known as: GLUCOPHAGE  Take 500 mg by mouth 3 (three) times daily.     montelukast 10 mg tablet  Commonly known as: SINGULAIR  TAKE 1 TABLET BY MOUTH  DAILY     MYRBETRIQ 50 mg Tb24  Generic drug: mirabegron  Take 1 tablet (50 mg total) by mouth once daily.     omega 3-dha-epa-fish oil 1,200 (144-216) mg Cap  Take 1 capsule by mouth once daily.     ondansetron 8 MG Tbdl  Commonly known as: ZOFRAN-ODT  Take 1 tablet (8 mg total) by mouth every 8 (eight) hours as needed (nausea).     pantoprazole 20 MG tablet  Commonly known as: PROTONIX  Take 1 tablet (20 mg total) by mouth once daily.     polyethylene glycol 17 gram/dose powder  Commonly known as: GLYCOLAX  Take 17 g by mouth before dinner.     PREMARIN vaginal cream  Generic drug: conjugated estrogens  INSERT 1/2 GRAM  VAGINALLY  TWICE WEEKLY     promethazine-codeine 6.25-10 mg/5 ml 6.25-10 mg/5 mL syrup  Commonly known as: PHENERGAN with CODEINE  Take 5 mLs by mouth every 4 to 6 hours as needed for Cough.     vitamin D 1000 units Tab  Commonly known as: VITAMIN D3  Take 185 mg by mouth once daily. D3            Indwelling Lines/Drains at time of discharge:   Lines/Drains/Airways     None                 Time spent on the discharge of patient: >45 minutes         Moisés Vela PA-C  Department of Hospital Medicine  [facility] - Telemetry Stepdown ([place]-)

## 2022-01-16 NOTE — ASSESSMENT & PLAN NOTE
76 yo female with chronic abdominal pain, constipation, h/o multiple abdominal surgeries and admission for suspected bowel obstructions. General surgery evaluated patient in the ED and feel her clinical history this episode and exam is inconsistent with bowel obstruction - this episode is most likely a functional problem. Recs include clear liquid diet, bowel regimen, and GI consult.      - small bowel follow through without obstruction  - CT abd/pelvis reviewed. It looks very similar to CT from 01/02 on last admission. Fecalization of stool within the small bowel consistent with chronic constipation  - tolerating PO diet, regular   - IVF, PRN antiemetics  - Scheduled tylenol and careful use of PRN narcotics  - Started on bowel regimen of miralax and senna-docusate  -GI consult, appreciate recs  - recommend Small bowel follow through  - optimize activity, promote ambulation  - supportive treatment  - advance diet as tolerated  - outpatient GI follow up   -General surgery consult, appreciate recs      - Recommend bowel regiment - enemas, laxatives, fiber      - Pro-motility agents may assist, could consider antiflatulents, bentyl, etc per GI.      - Recommend several small meals per day (6 small meals) as opposed to 3 larger meals would likely be beneficial.       - Surgery is not indicated and will not be beneficial for Mrs. Judd.

## 2022-01-16 NOTE — PLAN OF CARE
[facility] - Telemetry Stepdown ([place] [address])      HOME HEALTH ORDERS  FACE TO FACE ENCOUNTER    Patient Name: Anayeli Judd  YOB: 1945    PCP: Rocio Mc MD   [address] [place] [address] / [facility] [address]  PCP Phone Number: [phone]  PCP Fax: [phone]    Encounter Date: 1/12/22    Admit to Home Health    Diagnoses:  Active Hospital Problems    Diagnosis  POA    *Generalized abdominal pain [R10.84]  Yes    Acute cystitis without hematuria [N30.00]  Yes     Priority: 2     Type 2 diabetes mellitus without complication, without long-term current use of insulin [E11.9]  Yes     Priority: 3      Chronic    Other hyperlipidemia [E78.49]  Yes    Constipation [K59.00]  Yes    Mixed stress and urge urinary incontinence [N39.46]  Yes    Gait instability [R26.81]  Yes    Partial small bowel obstruction [K56.600]  Yes    GERD (gastroesophageal reflux disease) [K21.9]  Yes    Hypothyroid [E03.9]  Yes      Resolved Hospital Problems   No resolved problems to display.       Follow Up Appointments:  Future Appointments   Date Time Provider Department Center   1/21/2022  8:00 AM Camille Munoz NP [facility] [place]   1/24/2022  1:30 PM LAB, [place] [facility] [place]   1/26/2022  4:30 PM [facility] OIC-CT1 500 LB LIMIT [facility]   3/17/2022 10:00 AM Ramiro Jefferson MD [facility] GASTRO [facility]       Allergies:  Review of patient's allergies indicates:   Allergen Reactions    Dilaudid [hydromorphone (bulk)] Other (See Comments)     Oversedating, head burning. Pt prefers to avoid.       Codeine Nausea And Vomiting     Other reaction(s): Itching    Dilaudid [hydromorphone]     Percocet  [oxycodone-acetaminophen]      Other reaction(s): Itching    Sulfa (sulfonamide antibiotics)      Other reaction(s): Nausea  Other reaction(s): Itching       Medications: Review discharge medications with patient and family and provide education.    Current Facility-Administered  Medications   Medication Dose Route Frequency Provider Last Rate Last Admin    acetaminophen tablet 1,000 mg  1,000 mg Oral Q8H Ariadna Lozano NP   1,000 mg at 01/15/22 1335    amoxicillin capsule 500 mg  500 mg Oral Q12H Moisés Vela PA-C        atorvastatin tablet 40 mg  40 mg Oral Daily Ariadna Lozano NP   40 mg at 01/15/22 1007    azelastine 137 mcg (0.1 %) nasal spray 274 mcg  2 spray Nasal BID Ariadna Lozano NP   274 mcg at 01/15/22 1009    butalbital-acetaminophen-caffeine -40 mg per tablet 1 tablet  1 tablet Oral Q6H PRN Ariadna Lozano NP        dextrose 50% injection 12.5 g  12.5 g Intravenous PRN Ariadna Lozano NP        dextrose 50% injection 25 g  25 g Intravenous PRN Ariadna Lozano NP        diphenhydrAMINE capsule 25 mg  25 mg Oral Q6H PRN Ariadna Lozano NP        DULoxetine DR capsule 30 mg  30 mg Oral BID Ariadna Lozano NP   30 mg at 01/15/22 1007    enoxaparin injection 40 mg  40 mg Subcutaneous Daily Ariadna Lozano NP   40 mg at 01/14/22 1624    glucagon (human recombinant) injection 1 mg  1 mg Intramuscular PRN Ariadna Lozano NP        glucose chewable tablet 16 g  16 g Oral PRN Ariadna Lozano NP        glucose chewable tablet 24 g  24 g Oral PRN Ariadna Lozano NP        insulin aspart U-100 pen 0-5 Units  0-5 Units Subcutaneous QID (AC + HS) PRN Ariadna Lozano NP   3 Units at 01/15/22 1322    Lactobacillus rhamnosus GG capsule 1 capsule  1 capsule Oral Daily Moisés Vela PA-C   1 capsule at 01/15/22 1008    levothyroxine tablet 75 mcg  75 mcg Oral Before breakfast Ariadna Lozano NP   75 mcg at 01/15/22 0548    magnesium oxide tablet 800 mg  800 mg Oral Daily Moisés Vela PA-C   800 mg at 01/15/22 1321    melatonin tablet 6 mg  6 mg Oral Nightly PRN Ariadna Lozano NP        montelukast tablet 10 mg  10 mg Oral Daily Ariadna Lozano, OSORIO   10 mg at 01/15/22 1008    morphine injection 2 mg  2 mg  Intravenous Q6H PRN Ariadna Lozano NP   2 mg at 01/12/22 2351    naloxone 0.4 mg/mL injection 0.02 mg  0.02 mg Intravenous PRN Ariadna Lozano NP        ondansetron injection 4 mg  4 mg Intravenous Q8H PRN Ariadna Lozano NP        oxybutynin 24 hr tablet 10 mg  10 mg Oral Daily Ariadna Lozano NP   10 mg at 01/15/22 1008    oxyCODONE immediate release tablet 5 mg  5 mg Oral Q6H PRN Ariadna Lozano NP        pantoprazole EC tablet 20 mg  20 mg Oral Daily Ariadna Lozano NP   20 mg at 01/15/22 1008    polyethylene glycol packet 17 g  17 g Oral BID Moisés Vela PA-C        promethazine (PHENERGAN) 6.25 mg in dextrose 5 % 50 mL IVPB  6.25 mg Intravenous Q6H PRN Ariadna Lozano NP        psyllium husk (aspartame) 3.4 gram packet 1 packet  1 packet Oral BID Moisés Vela PA-C        senna-docusate 8.6-50 mg per tablet 1 tablet  1 tablet Oral BID Ariadna Lozano NP   1 tablet at 01/15/22 1008    sodium chloride 0.9% flush 10 mL  10 mL Intravenous Q12H PRN Ariadna Lozano NP         Current Discharge Medication List      START taking these medications    Details   amoxicillin (AMOXIL) 500 MG capsule Take 1 capsule (500 mg total) by mouth every 12 (twelve) hours. for 7 days  Qty: 14 capsule, Refills: 0      Lactobacillus rhamnosus GG (CULTURELLE) 10 billion cell capsule Take 1 capsule by mouth once daily.  Qty: 30 capsule, Refills: 0      psyllium husk, aspartame, (METAMUCIL) 3.4 gram PwPk packet Take 1 packet by mouth 2 (two) times daily.  Qty: 60 packet, Refills: 0      senna-docusate 8.6-50 mg (PERICOLACE) 8.6-50 mg per tablet Take 1 tablet by mouth 2 (two) times daily.  Qty: 60 tablet, Refills: 2         CONTINUE these medications which have NOT CHANGED    Details   ascorbic Acid (VITAMIN C) 500 mg CpSR Take 500 mg by mouth once daily.       atorvastatin (LIPITOR) 40 MG tablet TAKE 1 TABLET BY MOUTH  DAILY  Qty: 90 tablet, Refills: 3    Comments: Requesting 1 year  supply  Associated Diagnoses: Hyperlipidemia, unspecified hyperlipidemia type      azelastine (ASTELIN) 137 mcg (0.1 %) nasal spray 2 sprays (274 mcg total) by Nasal route 2 (two) times daily.  Qty: 30 mL, Refills: 1    Associated Diagnoses: Rhinitis      biotin 5,000 mcg TbDL Take 1 tablet by mouth once daily.       butalbital-acetaminophen-caffeine -40 mg (FIORICET, ESGIC) -40 mg per tablet SMARTSI Tablet(s) By Mouth 1 to 3 Times Daily PRN      cholestyramine-aspartame (CHOLESTYRAMINE LIGHT) 4 gram PwPk Take 1 packet (4 g total) by mouth daily as needed (Diarrhea).  Qty: 90 packet, Refills: 0    Comments: Take this medicine 4 hours before or 4 hours after your other medications so this medication does not accidentally bind your other medications and prevent your other medicines from being absorbed  Associated Diagnoses: Diarrhea in adult patient      clotrimazole-betamethasone 1-0.05% (LOTRISONE) cream Apply topically 2 (two) times daily.      co-enzyme Q-10 30 mg capsule Take 30 mg by mouth 3 (three) times daily.      conjugated estrogens (PREMARIN) vaginal cream INSERT 1/2 GRAM VAGINALLY  TWICE WEEKLY  Qty: 30 g, Refills: 3    Comments: Requesting 1 year supply  Associated Diagnoses: Vaginal atrophy      cranberry extract 200 mg Cap Take 1 capsule by mouth once daily.       D-MANNOSE ORAL Take 1 tablet by mouth once daily.       DULoxetine (CYMBALTA) 30 MG capsule Take 1 capsule (30 mg total) by mouth 2 (two) times daily.  Qty: 180 capsule, Refills: 3    Comments: Requesting 1 year supply      flash glucose sensor (FREESTYLE EFREN 14 DAY SENSOR) Kit 1 application by Misc.(Non-Drug; Combo Route) route every 14 (fourteen) days. Disp 30 or 90 day refill  Qty: 6 kit, Refills: 6      FREESTYLE EFREN 10 DAY READER Misc TEST as directed  Qty: 3 each, Refills: 3      FREESTYLE LITE STRIPS Strp TEST DAILY AS DIRECTED  Qty: 50 strip, Refills: 2    Associated Diagnoses: Diabetes mellitus, type 2       hydrocortisone 2.5 % cream Apply topically 2 (two) times daily as needed.  Qty: 1 each, Refills: 1      levothyroxine (SYNTHROID) 75 MCG tablet Take 1 tablet (75 mcg total) by mouth before breakfast.  Qty: 90 tablet, Refills: 3      magnesium 30 mg Tab Take 500 capsules by mouth.       metFORMIN (GLUCOPHAGE) 500 MG tablet Take 500 mg by mouth 3 (three) times daily.      mirabegron (MYRBETRIQ) 50 mg Tb24 Take 1 tablet (50 mg total) by mouth once daily.  Qty: 60 tablet, Refills: 3      montelukast (SINGULAIR) 10 mg tablet TAKE 1 TABLET BY MOUTH  DAILY  Qty: 90 tablet, Refills: 3    Comments: Requesting 1 year supply      omega 3-dha-epa-fish oil 1,200 (144-216) mg Cap Take 1 capsule by mouth once daily.       ondansetron (ZOFRAN-ODT) 8 MG TbDL Take 1 tablet (8 mg total) by mouth every 8 (eight) hours as needed (nausea).  Qty: 30 tablet, Refills: 0      pantoprazole (PROTONIX) 20 MG tablet Take 1 tablet (20 mg total) by mouth once daily.  Qty: 30 tablet, Refills: 2      polyethylene glycol (GLYCOLAX) 17 gram/dose powder Take 17 g by mouth before dinner.  Qty: 1530 g, Refills: 1    Comments: Take 17 g in 12 oz water once daily before dinner  Associated Diagnoses: Constipation, chronic; Partial small bowel obstruction      promethazine-codeine 6.25-10 mg/5 ml (PHENERGAN WITH CODEINE) 6.25-10 mg/5 mL syrup Take 5 mLs by mouth every 4 to 6 hours as needed for Cough.  Qty: 240 mL, Refills: 0    Comments: Quantity prescribed more than 7 day supply? No      vitamin D 1000 units Tab Take 185 mg by mouth once daily. D3               I have seen and examined this patient within the last 30 days. My clinical findings that support the need for the home health skilled services and home bound status are the following:no   Weakness/numbness causing balance and gait disturbance due to Infection and Weakness/Debility making it taxing to leave home.     Diet:   cardiac diet    Labs:  SN to perform labs:  CBC, CMP in 1 week and Report  Lab results to PCP.    Referrals/ Consults  Physical Therapy to evaluate and treat. Evaluate for home safety and equipment needs; Establish/upgrade home exercise program. Perform / instruct on therapeutic exercises, gait training, transfer training, and Range of Motion.  Occupational Therapy to evaluate and treat. Evaluate home environment for safety and equipment needs. Perform/Instruct on transfers, ADL training, ROM, and therapeutic exercises.   to evaluate for community resources/long-range planning.  Aide to provide assistance with personal care, ADLs, and vital signs.    Activities:   activity as tolerated    Nursing:   Agency to admit patient within 24 hours of hospital discharge unless specified on physician order or at patient request    SN to complete comprehensive assessment including routine vital signs. Instruct on disease process and s/s of complications to report to MD. Review/verify medication list sent home with the patient at time of discharge  and instruct patient/caregiver as needed. Frequency may be adjusted depending on start of care date.     Skilled nurse to perform up to 3 visits PRN for symptoms related to diagnosis    Notify MD if SBP > 160 or < 90; DBP > 90 or < 50; HR > 120 or < 50; Temp > 101; O2 < 88%    Ok to schedule additional visits based on staff availability and patient request on consecutive days within the home health episode.    When multiple disciplines ordered:    Start of Care occurs on Sunday - Wednesday schedule remaining discipline evaluations as ordered on separate consecutive days following the start of care.    Thursday SOC -schedule subsequent evaluations Friday and Monday the following week.     Friday - Saturday SOC - schedule subsequent discipline evaluations on consecutive days starting Monday of the following week.    For all post-discharge communication and subsequent orders please contact patient's primary care physician. If unable to reach  primary care physician or do not receive response within 30 minutes, please contact PCP for clinical staff order clarification    Miscellaneous   N/A    Home Health Aide:  Nursing Weekly, Physical Therapy Three times weekly, Occupational Therapy Three times weekly and Home Health Aide Three times weekly    Wound Care Orders  n/a    I certify that this patient is confined to her home and needs intermittent skilled nursing care, physical therapy and occupational therapy.

## 2022-01-17 ENCOUNTER — TELEPHONE (OUTPATIENT)
Dept: BARIATRICS | Facility: CLINIC | Age: 77
End: 2022-01-17
Payer: MEDICARE

## 2022-01-18 ENCOUNTER — TELEPHONE (OUTPATIENT)
Dept: BARIATRICS | Facility: CLINIC | Age: 77
End: 2022-01-18
Payer: MEDICARE

## 2022-01-18 ENCOUNTER — PATIENT MESSAGE (OUTPATIENT)
Dept: SURGERY | Facility: CLINIC | Age: 77
End: 2022-01-18
Payer: MEDICARE

## 2022-01-18 ENCOUNTER — PATIENT OUTREACH (OUTPATIENT)
Dept: ADMINISTRATIVE | Facility: CLINIC | Age: 77
End: 2022-01-18
Payer: MEDICARE

## 2022-01-18 ENCOUNTER — PATIENT MESSAGE (OUTPATIENT)
Dept: GASTROENTEROLOGY | Facility: CLINIC | Age: 77
End: 2022-01-18
Payer: MEDICARE

## 2022-01-18 NOTE — TELEPHONE ENCOUNTER
----- Message from Nikki Lentz, Patient Care Assistant sent at 1/13/2022  2:17 PM CST -----  Regarding: request  Contact: Case Managment   is requesting a call to the pt in regards to an appt.  states physician is requesting that see gets an appt. Requesting Dr. Barriga or Dr Medrano.        Pt @ [phone]

## 2022-01-18 NOTE — PROGRESS NOTES
C3 nurse attempted to contact Anayeli Judd  for a TCC post hospital discharge follow up call. The patient is unable to conduct the call @ this time. The patient requested a callback.    The patient has a scheduled HOSFU appointment with Alexander AC @ [place] on 1/21/2022. Message sent to Physician staff.

## 2022-01-18 NOTE — PHYSICIAN QUERY
PT Name: Anayeli Judd  MR #: [id number]   DOCUMENTATION CLARIFICATION   Ann Fenton RN, CCDS    [email or web address]    Documentation Excellence  This form is a permanent document in the medical record.     Query Date: January 18, 2022    By submitting this query, we are merely seeking further clarification of documentation to reflect the severity of illness of your patient. Please utilize your independent clinical judgment when addressing the question(s) below.    The medical record reflects the following:   Indicators   Supporting Clinical Findings Location in Medical Record   x Bowel obstruction, intestinal obstruction, LBO or SBO documented General surgery evaluated patient in the ED and feel her clinical history this episode and exam is inconsistent with bowel obstruction - this episode is most likely a functional problem.     Partial Small Bowel Obstruction    Admitted for abd pain   H&P           x Radiology findings CT abdomen/pelvis concerning for partial SBO.     small bowel follow-through which was without obstruction    - CT abd/pelvis reviewed. It looks very similar to CT from 01/02 on last admission. Fecalization of stool within the small bowel consistent with chronic constipation   DCS          H&P   x Treatment/  Medication Supportive care  GI consult  Sx consult    - Clear liquid diet, advance as tolerated.  -IVF  -PRN antiemetics  -Scheduled tylenol and careful use of PRN narcotics  -Started on bowel regimen of miralax and senna-docusate    Generalized abdominal pain  ·  h/o multiple abdominal surgeries and admission for suspected bowel obstructions.   · Her clinical history this episode and exam is inconsistent with bowel obstruction - this episode is most likely a functional problem.   · Vascular inflow looks good, ischemic colitis unlikely.  · - No surgical intervention warranted.          - Recommend bowel regiment - enemas, laxatives, fiber        - Pro-motility agents may  assist, could consider antiflatulents,          bentyl, etc per GI.       - Dietary modification may also help. Several small meals per day         (6 small meals) as opposed to 3 larger meals would likely be          beneficial.  Dcs          H&P                Sx Pn 1/14          Procedure/Surgery     x Other Patient was evaluated by general surgery who suspect a functional component to her presentation and recommended gastroenterology consults with aggressive bowel regimen.    no surgical intervention is needed at this time.    Urine culture grew enterococcus    Acute cystitis without hematuria  -Started on rocephin in the ED, changed to ciprofloxacin based on previous urine cxs    Constipation, chronic issue - Miralax daily and senna-colace bid Dcs                  H&P      H&P     Provider,  Please further specify the bowel obstruction diagnosis:    [   ] Partial or incomplete intestinal obstruction, due to chronic constipation   [   ] Partial SBO - due to other etiology - specify: _____________   [   ] Partial SBO  ruled out  -  etiology of abd pain is  chronic constipation   [ x  ] Partial SBO  ruled out  -  Other etiology of abd pain - specify: functional    [   ] Other diagnosis - specify: ___________   [   ]  Clinically Undetermined     Please document in your progress notes daily for the duration of treatment until resolved, and include in your discharge summary.

## 2022-01-18 NOTE — PROGRESS NOTES
C3 nurse spoke with Anayeli Judd  for a TCC post hospital discharge follow up call. The patient has a scheduled HOSFU appointment with Alexander AC @ [place] on 1/21/2022.

## 2022-01-18 NOTE — PLAN OF CARE
[facility] - Telemetry Stepdown ([facility])  Discharge Final Note    Primary Care Provider: Rocio Mc MD    Expected Discharge Date: 1/15/2022    Patient discharged to home via personal transportation.     Patient's bedside nurse and patient notified of the above.      Final Discharge Note (most recent)       Final Note - 01/18/22 0946          Final Note    Assessment Type Final Discharge Note (P)      Anticipated Discharge Disposition Home-Health Care Svc (P)         Post-Acute Status    Post-Acute Authorization Home Health (P)      Home Health Status Set-up Complete/Auth obtained (P)                      Important Message from Medicare                  Future Appointments   Date Time Provider Department Center   1/21/2022  8:00 AM Camille Munoz NP [facility] C3HV [place]   1/24/2022  1:30 PM LAB, [place] [facility] LAB [place]   1/26/2022  4:30 PM [facility] OIC-CT1 500 LB LIMIT [facility]   3/17/2022 10:00 AM Ramiro Jefferson MD [facility] GASTRO [facility]        scheduled post-discharge follow-up appointment and information added to AVS.     Patient has been accepted with [facility].    Vianney Zhou LMSW  [facility]  Ext. [phone]

## 2022-01-18 NOTE — PATIENT INSTRUCTIONS
Patient Education       Severe Abdominal Pain Discharge Instructions, Adult   About this topic   Abdominal or belly pain is pain between your chest and hips. Sometimes, it is a sign of a problem that is not very serious. Viruses or germs are a common cause. Overeating, gas pains, and food poisoning can all cause belly pain. Stools that are too loose or too hard can also cause belly pain. Sometimes, belly pain is a sign of a very bad health problem, like bleeding or an infection. How bad the pain is does not reflect how bad the problem may be. Some serious problems can cause very little pain.     What care is needed at home?   · Ask your doctor what you need to do when you go home. Make sure you ask questions if you do not understand what the doctor says.  · Keep a diary about your pain to help your doctor learn more about the cause. Write down the foods you eat to see if they may be the cause of your pain. Also, write down what you were doing before and during the pain.  · Eat small meals more often. Eat more fiber if hard stools are a problem.  · Avoid foods or drinks that make your pain worse. Some people are bothered by:  ? Drinks that are fizzy or have caffeine.  ? Fried, greasy, or fatty foods.  ? Orange juice.  ? Milk or cheese can bother some peoples stomach as well.  · When you have pain, you can:  ? Try to have a bowel movement.  ? Lie down and rest.  ? Avoid solid foods for a few hours. If you are hungry, try liquids like broth or water. When you feel better, try mild foods like rice, crackers, bananas, applesauce, or toast.  · Dont take over-the-counter medicines, such as antacids or laxatives, unless they are ordered.  · Check with the doctor before you take any herbal medicines or supplements.     What follow-up care is needed?   Your doctor may ask you to make visits to the office to check on your progress. Be sure to keep these visits.   What drugs may be needed?   The drugs that your doctor will  give depend on what causes the pain. The doctor may order drugs to:  · Help with pain and swelling  · Lower muscle spasms or contractions  · Lower acid levels in your belly  · Relax bowel muscles  · Fight an infection  · Control hormones  · Lower stress or anxiety  Take your drugs as directed by your doctor. Talk to you doctor about side effects of the drugs.  What problems could happen?   · Trouble dealing with ongoing pain  · Less appetite  · Not able to do regular activities  When do I need to call the doctor?   · You have sudden severe belly pain, or the pain is constant.  · You have trouble breathing or chest pain along with your belly pain.  · You start throwing up blood or pass a lot of blood in your stool.  · Your belly becomes very hard or swollen.  · You have signs of severe fluid loss, such as:  ? No urine for more than 8 hours.  ? You feel very light-headed or like you are going to pass out.  ? You feel weak, like you are going to fall.  · Your pain gets worse, comes more often, or moves to one area of the belly.  · You have an upset stomach or throwing up that isnt getting better and are having trouble keeping down food and drink.  · Your stools are black or tar-colored.  · If the pain is not gone or not getting better in 1 to 2 days.  · You have a fever of 100.4°F (38°C) or higher, chills.  · You develop early signs of fluid loss, such as:  ? Your urine is very dark-colored.  ? Your mouth is dry.  ? You have muscle cramps.  ? You have a lack of energy.  ? You feel light-headed when you get up.  · You have pain with passing urine or have blood in your urine.  · Your stools have a small amount (less than 1 teaspoon or 5 mL) of blood in them.  · You feel that something is not right in your belly.  Teach Back: Helping You Understand   The Teach Back Method helps you understand the information we are giving you. After you talk with the staff, tell them in your own words what you learned. This helps to make  sure the staff has described each thing clearly. It also helps to explain things that may have been confusing. Before going home, make sure you can do these:  · I can tell you about my pain.  · I can tell you what may help ease my pain.  · I can tell you what I will do if I have very bad back, side, chest, or belly pain; more pain; or the pain comes more often.  Where can I learn more?   [employer]  https://www.iffgd.org/lower-gi-disorders/functional-abdominal-pain-syndrome.html   NHS  https://www.nhs.uk/conditions/stomach-ache/   Last Reviewed Date   2021-06-04  Consumer Information Use and Disclaimer   This information is not specific medical advice and does not replace information you receive from your health care provider. This is only a brief summary of general information. It does NOT include all information about conditions, illnesses, injuries, tests, procedures, treatments, therapies, discharge instructions or life-style choices that may apply to you. You must talk with your health care provider for complete information about your health and treatment options. This information should not be used to decide whether or not to accept your health care providers advice, instructions or recommendations. Only your health care provider has the knowledge and training to provide advice that is right for you.  Copyright   Copyright © 2021 [employer] and its affiliates and/or licensors. All rights reserved.    Gay teaching reviewed with Anayeli Judd  . She verbalized understanding.    Education was provided based on the patient's discharge diagnosis using the attached [employer] patient education as a reference.

## 2022-01-19 PROCEDURE — G0180 PR HOME HEALTH MD CERTIFICATION: ICD-10-PCS | Mod: ,,, | Performed by: INTERNAL MEDICINE

## 2022-01-19 PROCEDURE — G0180 MD CERTIFICATION HHA PATIENT: HCPCS | Mod: ,,, | Performed by: INTERNAL MEDICINE

## 2022-01-21 ENCOUNTER — OFFICE VISIT (OUTPATIENT)
Dept: HOME HEALTH SERVICES | Facility: CLINIC | Age: 77
End: 2022-01-21
Payer: MEDICARE

## 2022-01-21 DIAGNOSIS — R10.9 ABDOMINAL DISCOMFORT: ICD-10-CM

## 2022-01-21 DIAGNOSIS — K58.2 IRRITABLE BOWEL SYNDROME WITH BOTH CONSTIPATION AND DIARRHEA: ICD-10-CM

## 2022-01-21 DIAGNOSIS — N30.00 ACUTE CYSTITIS WITHOUT HEMATURIA: ICD-10-CM

## 2022-01-21 DIAGNOSIS — K58.9 IRRITABLE BOWEL SYNDROME, UNSPECIFIED TYPE: ICD-10-CM

## 2022-01-21 DIAGNOSIS — K56.609 SBO (SMALL BOWEL OBSTRUCTION): Primary | ICD-10-CM

## 2022-01-21 PROCEDURE — 99350 HOME/RES VST EST HIGH MDM 60: CPT | Mod: S$GLB,,, | Performed by: NURSE PRACTITIONER

## 2022-01-21 PROCEDURE — 99350 PR HOME VISIT,ESTAB PATIENT,LEVEL IV: ICD-10-PCS | Mod: S$GLB,,, | Performed by: NURSE PRACTITIONER

## 2022-01-22 VITALS
OXYGEN SATURATION: 99 % | DIASTOLIC BLOOD PRESSURE: 62 MMHG | TEMPERATURE: 98 F | SYSTOLIC BLOOD PRESSURE: 101 MMHG | HEART RATE: 71 BPM | RESPIRATION RATE: 18 BRPM

## 2022-01-22 PROBLEM — R10.9 ABDOMINAL DISCOMFORT: Status: ACTIVE | Noted: 2022-01-22

## 2022-01-22 RX ORDER — SIMETHICONE 80 MG
80 TABLET,CHEWABLE ORAL EVERY 6 HOURS PRN
Qty: 30 TABLET | Refills: 0 | Status: SHIPPED | OUTPATIENT
Start: 2022-01-22 | End: 2022-02-17

## 2022-01-22 NOTE — ASSESSMENT & PLAN NOTE
Reports compliance with abx regimen  Denies fever, chills on signs of worsening infection  Continue to follow with PCP

## 2022-01-22 NOTE — ASSESSMENT & PLAN NOTE
Reports that she feel gas pain  She is currently on bowel regimen  Prescribed simethicone for gas pain  Keep scheduled follow up with GI and general surgery

## 2022-01-22 NOTE — PROGRESS NOTES
"[facility]  Transition of Care Home Visit    Visit Date: 1/21/2022  Encounter Provider: Dale Munoz NP  PCP:  Rocio Mc MD    PRESENTING HISTORY      Patient ID: Anayeli Judd 77 y.o. female.    Consult Requested By:  Dr. Rocio Mc  Reason for Consult:  Transitional Care Coordination    Chief Complaint: Transitional Care     The patient is being seen at home due to a physical debility that presents a taxing effort to leave the home, to mitigate high risk of hospital readmission or due to the limited availability of reliable or safe options for transportation to the point of access to the provider. The visit meets the criteria for medical necessity as defined by CMS as "health-care services needed to prevent, diagnose, or treat an illness, injury, condition, disease, or its symptoms and that meet accepted standards of medicine." Prior to treatment on this visit the chart was reviewed and patient consent was obtained.    HPI:   Anayeli Judd is a 77 y.o. female with a PMHx of HLD, hypothyroidism, GERD, IBS, DM2, urinary incontinence, colon cancer s/p resection (2010), and multiple SBO s/p ex lap x2 who presents to the ED with complaints of abdominal pain and nausea. The patient reports severe generalized abdominal pain beginning last night and worsening this morning. Described as cramping pain that comes in waves. Denies any aggravating or alleviating factors. The patient does endorse fatigue, decrease appetite, chills and fever Tmax 99.1. Denies any vomiting. The patient notes she has chronic issues with constipation but states her BMs were more regular the last few days. Reports her last BM was this morning and was soft but "formed." Of note the patient was recently admitted at [facility] under general surgery for abdominal pain and partial SBO from 1/1-1/4. Her symptoms at that time were attributed to constipation. The patient denies any chest pain, shortness of breath, cough, " diarrhea, dysuria, urinary frequency, lightheadedness, or dizziness.     In the ED, VSSAF. CBC with thrombocytopenia which is similar to previous labs. Tbili 1.6, appears elevated at baseline. AST/ALT and alk phos WNL. Lipase WNL. Lactate WNL. UA with 3+ leukocytes, >100 WBCs, and rare bacteria. The patient received dicyclomine, morphine, zofran, and rocephin while in the ED. The ED provider consulted general surgery who evaluated the patient and feel no surgical intervention is needed at this time.       Hospital Course:   77 y.o. who was admitted to hospital medicine for abdominal discomfort. CT abdomen/pelvis concerning for partial SBO. Patient was evaluated by general surgery who suspect a functional component to her presentation and recommended gastroenterology consults with aggressive bowel regimen. Gastroenterology evaluated and recommended small bowel follow-through which was without obstruction therefore supportive care with outpatient follow-up. Urine culture grew enterococcus, antibiotics adjusted per sensitivities and ID recs. Patient tolerated PO diet without difficulty. Discharged home in stable condition.      Today:  Ms. Anayeli Judd is a 77 y.o. female is being seen and examined at home today for transitional care visit to the home environment post-discharge from inpatient hospitalization encounter described above. Anayeli presents at her baseline state of health.  She reports she has chronic abdominal issues and follows with GI/Dr. Doe.  She reports her previous hospitalization was due to worsening symptoms from her baseline.  Today she reports some abdominal discomfort, close to her baseline.  She reports gas discomfort.  Denies chest pain, SOB, fever, chills, cough, congestion, dysuria.  She has private caregiver support in home.  She requires assistance with ADLS.  She reports taking medication as prescribed.  Reports good po intake, sleep pattern.  Feels she could benefit from taking  meds for gas discomfort. VSS. Reports taking all medications as prescribed. She is aware of upcoming follow up with GI, General Sx and outpatient lab appt.  She plans on attending all appointments. No other needs identified at this time. Risks of environmental exposure to coronavirus discussed including: social distancing, hand hygiene, and limiting departures from the home for necessities only.  Reports understanding and willingness to comply.     Attestation: Screening criteria to assess the level of the patient's risk for infection with COVID-19 as recommended by the CDC at the time of the above documented home visit concluded appropriateness to proceed. Universal precautions were maintained at all times, including provider use of >60% alcohol gel hand  immediately prior to entry and upon departing the patient's home as well as cleaning of equipment used in home visit with antibacterial/germicidal disposable wipes.    Admit Date: 1/12/22  Discharge Date: 1/15/22  ________________________________________________________________    Review of Systems   Constitutional: Negative for chills and fever.   HENT: Negative for congestion, postnasal drip and rhinorrhea.    Eyes: Negative for visual disturbance.   Cardiovascular: Negative for chest pain and palpitations.   Gastrointestinal:        Reports abdominal discomfort, gas pain    Genitourinary: Negative for difficulty urinating.   Musculoskeletal: Negative for arthralgias and myalgias.   Skin: Negative for color change.   Neurological: Positive for weakness. Negative for dizziness.   Hematological: Does not bruise/bleed easily.   Psychiatric/Behavioral: Negative for agitation.     Assessments:  · Environmental: 7th floor of apt complex, elevator to enter, adequate lighting and temperature control  · Functional Status: Assistance with ADL's/IADL's, ambulates with assistance of a cane/walker, continent of bowel and bladder  · Safety: Fall Precautions, COVID  Precautions/Social Distancing/Mask Use  · Nutritional: Adequate  · Home Health: [facility]     PAST HISTORY:     Past Medical History:   Diagnosis Date    Abdominal pain     Allergic rhinitis     Arthritis     Blood platelet disorder     Blood platelet disorder     Blood transfusion     during delivery and     Bowel obstruction     Cervical radiculopathy     followed by dr cloud    Colon cancer     transverse colon; resected; Stage IIA (pT3 pN0 MX)    Diabetes mellitus     Diarrhea     Falls 2020    Family history of breast cancer     Family history of colon cancer     Fatty liver     GERD (gastroesophageal reflux disease)     History of shingles     Hyperlipidemia     Hypothyroidism     Irritable bowel syndrome     Microscopic colitis     treated     Raynaud phenomenon     Raynaud's disease     Type 2 diabetes mellitus        Past Surgical History:   Procedure Laterality Date    APPENDECTOMY      BACK SURGERY      CARPAL TUNNEL RELEASE      bilateral      SECTION      CHOLECYSTECTOMY  1965    COLECTOMY  2011    Transverse colon resection by Dr. Aguirre    COLONOSCOPY N/A 2017    Procedure: COLONOSCOPY;  Surgeon: Manjit Alvarez MD;  Location: [facility] ([address] [place] [address]);  Service: Endoscopy;  Laterality: N/A;    COLONOSCOPY N/A 2019    Procedure: COLONOSCOPY;  Surgeon: Ramiro Jefferson MD;  Location: [facility] ([address] [place] [address]);  Service: Endoscopy;  Laterality: N/A;  PM prep    CYSTOSCOPY N/A 2021    Procedure: CYSTOSCOPY;  Surgeon: Viridiana Valenzuela MD;  [address];  Service: Urology;  Laterality: N/A;    ENDOSCOPIC ULTRASOUND OF UPPER GASTROINTESTINAL TRACT N/A 2018    Procedure: ULTRASOUND, UPPER GI TRACT, ENDOSCOPIC;  Surgeon: Jose Hess MD;  Location: [facility];  Service: Endoscopy;  Laterality: N/A;    ENDOSCOPIC ULTRASOUND OF UPPER GASTROINTESTINAL TRACT N/A 2019    Procedure: ULTRASOUND, UPPER GI TRACT,  ENDOSCOPIC;  Surgeon: Jose Hess MD;  Location: [facility] ENDO (2ND FLR);  Service: Endoscopy;  Laterality: N/A;    ESOPHAGOGASTRODUODENOSCOPY N/A 11/16/2018    Procedure: EGD (ESOPHAGOGASTRODUODENOSCOPY);  Surgeon: Angelo Reynolds MD;  Location: [facility] ENDO (2ND FLR);  Service: Endoscopy;  Laterality: N/A;    ESOPHAGOGASTRODUODENOSCOPY N/A 6/26/2019    Procedure: EGD (ESOPHAGOGASTRODUODENOSCOPY);  Surgeon: Ramiro Jefferson MD;  Location: [facility] ENDO (4TH FLR);  Service: Endoscopy;  Laterality: N/A;    EYE SURGERY      Cataract Removal    FLUOROSCOPIC URODYNAMIC STUDY N/A 11/9/2021    Procedure: URODYNAMIC STUDY, FLUOROSCOPIC;  Surgeon: Viridiana Valenzuela MD;  Location: [facility] OR 1ST FLR;  Service: Urology;  Laterality: N/A;  90 minutes     HYSTERECTOMY      posterolateral fusion with autograft bone and Eun mineralized bone matrix  2/1/13    at [facility] for lumbar spine stenosis    TOE SURGERY      TONSILLECTOMY      TRIGGER FINGER RELEASE         Family History   Problem Relation Age of Onset    Heart disease Father 50        Mi age 50    Colon cancer Father     Bladder Cancer Mother         non smoker    Cataracts Mother     Glaucoma Mother     Heart disease Mother     Hyperlipidemia Mother     Kidney disease Mother     Breast cancer Sister 79    Arthritis Daughter     Asthma Daughter     Depression Daughter     Hypertension Daughter     Stroke Daughter 40    Arthritis Brother     Colon cancer Brother 70    Alcohol abuse Brother     Parkinsonism Brother     Alcohol abuse Brother     Depression Daughter     Celiac disease Neg Hx     Cirrhosis Neg Hx     Colon polyps Neg Hx     Crohn's disease Neg Hx     Cystic fibrosis Neg Hx     Esophageal cancer Neg Hx     Hemochromatosis Neg Hx     Inflammatory bowel disease Neg Hx     Irritable bowel syndrome Neg Hx     Liver cancer Neg Hx     Liver disease Neg Hx     Rectal cancer Neg Hx     Stomach cancer Neg Hx     Ulcerative colitis  Neg Hx     Elizaar's disease Neg Hx     Amblyopia Neg Hx     Blindness Neg Hx     Macular degeneration Neg Hx     Retinal detachment Neg Hx     Strabismus Neg Hx     Melanoma Neg Hx        Social History     Socioeconomic History    Marital status:    Tobacco Use    Smoking status: Never Smoker    Smokeless tobacco: Never Used   Substance and Sexual Activity    Alcohol use: Yes     Comment: socially, hardly ever    Drug use: No    Sexual activity: Not Currently   Social History Narrative    , lives alone.  Primary support are her children and friends.       MEDICATIONS & ALLERGIES:     Current Outpatient Medications on File Prior to Visit   Medication Sig Dispense Refill    amoxicillin (AMOXIL) 500 MG capsule Take 1 capsule (500 mg total) by mouth every 12 (twelve) hours. for 7 days 14 capsule 0    ascorbic Acid (VITAMIN C) 500 mg CpSR Take 500 mg by mouth once daily.       atorvastatin (LIPITOR) 40 MG tablet TAKE 1 TABLET BY MOUTH  DAILY 90 tablet 3    azelastine (ASTELIN) 137 mcg (0.1 %) nasal spray 2 sprays (274 mcg total) by Nasal route 2 (two) times daily. 30 mL 1    biotin 5,000 mcg TbDL Take 1 tablet by mouth once daily.       butalbital-acetaminophen-caffeine -40 mg (FIORICET, ESGIC) -40 mg per tablet SMARTSI Tablet(s) By Mouth 1 to 3 Times Daily PRN      cholestyramine-aspartame (CHOLESTYRAMINE LIGHT) 4 gram PwPk Take 1 packet (4 g total) by mouth daily as needed (Diarrhea). 90 packet 0    clotrimazole-betamethasone 1-0.05% (LOTRISONE) cream Apply topically 2 (two) times daily.      co-enzyme Q-10 30 mg capsule Take 30 mg by mouth 3 (three) times daily.      conjugated estrogens (PREMARIN) vaginal cream INSERT 1/2 GRAM VAGINALLY  TWICE WEEKLY 30 g 3    cranberry extract 200 mg Cap Take 1 capsule by mouth once daily.       D-MANNOSE ORAL Take 1 tablet by mouth once daily.       DULoxetine (CYMBALTA) 30 MG capsule Take 1 capsule (30 mg total) by mouth 2  (two) times daily. 180 capsule 3    flash glucose sensor (FREESTYLE EFREN 14 DAY SENSOR) Kit 1 application by Misc.(Non-Drug; Combo Route) route every 14 (fourteen) days. Disp 30 or 90 day refill 6 kit 6    FREESTYLE EFREN 10 DAY READER [facility] TEST as directed 3 each 3    FREESTYLE LITE STRIPS Strp TEST DAILY AS DIRECTED 50 strip 2    hydrocortisone 2.5 % cream Apply topically 2 (two) times daily as needed. 1 each 1    Lactobacillus rhamnosus GG (CULTURELLE) 10 billion cell capsule Take 1 capsule by mouth once daily. 30 capsule 0    levothyroxine (SYNTHROID) 75 MCG tablet Take 1 tablet (75 mcg total) by mouth before breakfast. 90 tablet 3    magnesium 30 mg Tab Take 500 capsules by mouth.       metFORMIN (GLUCOPHAGE) 500 MG tablet TAKE 1 TABLET(500 MG) BY MOUTH THREE TIMES DAILY WITH MEALS 90 tablet 3    mirabegron (MYRBETRIQ) 50 mg Tb24 Take 1 tablet (50 mg total) by mouth once daily. 60 tablet 3    montelukast (SINGULAIR) 10 mg tablet TAKE 1 TABLET BY MOUTH  DAILY 90 tablet 3    omega 3-dha-epa-fish oil 1,200 (144-216) mg Cap Take 1 capsule by mouth once daily.       ondansetron (ZOFRAN-ODT) 8 MG TbDL Take 1 tablet (8 mg total) by mouth every 8 (eight) hours as needed (nausea). 30 tablet 0    pantoprazole (PROTONIX) 20 MG tablet Take 1 tablet (20 mg total) by mouth once daily. 30 tablet 2    polyethylene glycol (GLYCOLAX) 17 gram/dose powder Take 17 g by mouth before dinner. 1530 g 1    promethazine-codeine 6.25-10 mg/5 ml (PHENERGAN WITH CODEINE) 6.25-10 mg/5 mL syrup Take 5 mLs by mouth every 4 to 6 hours as needed for Cough. 240 mL 0    psyllium husk, aspartame, (METAMUCIL) 3.4 gram PwPk packet Take 1 packet by mouth 2 (two) times daily. 60 packet 0    senna-docusate 8.6-50 mg (PERICOLACE) 8.6-50 mg per tablet Take 1 tablet by mouth 2 (two) times daily. 60 tablet 2    vitamin D 1000 units Tab Take 185 mg by mouth once daily. D3       No current facility-administered medications on file prior to  visit.        Review of patient's allergies indicates:   Allergen Reactions    Dilaudid [hydromorphone (bulk)] Other (See Comments)     Oversedating, head burning. Pt prefers to avoid.       Codeine Nausea And Vomiting     Other reaction(s): Itching    Dilaudid [hydromorphone]     Percocet  [oxycodone-acetaminophen]      Other reaction(s): Itching    Sulfa (sulfonamide antibiotics)      Other reaction(s): Nausea  Other reaction(s): Itching       OBJECTIVE:     Vital Signs:  Vitals:    01/21/22 1300   BP: 101/62   Pulse: 71   Resp: 18   Temp: 98 °F (36.7 °C)     There is no height or weight on file to calculate BMI.       Physical Exam  HENT:      Head: Normocephalic and atraumatic.      Nose: No congestion or rhinorrhea.      Mouth/Throat:      Mouth: Mucous membranes are moist.   Cardiovascular:      Rate and Rhythm: Normal rate.   Pulmonary:      Effort: Pulmonary effort is normal.      Breath sounds: Normal breath sounds.   Abdominal:      General: Bowel sounds are normal.      Palpations: Abdomen is soft.   Musculoskeletal:      Cervical back: Neck supple.      Right lower leg: No edema.      Left lower leg: No edema.   Skin:     General: Skin is warm and dry.   Neurological:      Mental Status: She is alert. Mental status is at baseline.   Psychiatric:         Mood and Affect: Mood normal.       Laboratory  Lab Results   Component Value Date    WBC 3.17 (L) 01/15/2022    HGB 10.0 (L) 01/15/2022    HCT 30.7 (L) 01/15/2022    MCV 99 (H) 01/15/2022    PLT 77 (L) 01/15/2022     Lab Results   Component Value Date    INR 1.0 05/14/2021    INR 1.0 05/24/2018    INR 1.0 08/26/2017     Lab Results   Component Value Date    HGBA1C 6.7 (H) 10/16/2021     No results for input(s): POCTGLUCOSE in the last 72 hours.    TRANSITION OF CARE:     [facility] Contact Note: 1/18/22    Family and/or Caretaker present at visit?  Yes.  Diagnostic tests reviewed/disposition: Diagnostic test pending, patient to receive  outpatient labs next week, results will be sent to PCP   Disease/illness education: Importance of Compliance with all prescribed medications and treatments, COVID Precautions/Social Distancing/Mask Use  Home health/community services discussion/referrals: Patient has home health established at [facility].   Establishment or re-establishment of referral orders for community resources: No other necessary community resources.   Discussion with other health care providers: No discussion with other health care providers necessary.     Transition of Care Visit:  I have reviewed and updated the history and problem list. I have reconciled the medication list. I have discussed the hospitalization and current medical issues, prognosis and plans with the patient/family.     Medications Reconciliation:   I have reconciled the patient's home medications and discharge medications with the patient/family. I have updated all changes. Refer to After-Visit Medication List.    Discharge plans, follow-up instructions, future appointments, provider contact information, indicators to seek emergency treatment and encouragement to call for any questions, concerns or clarification of the patient's plan of care explained to patient and/or caregiver(s), whom confirm understanding of provided information and endorse willingness to comply.     ASSESSMENT & PLAN:     HIGH RISK CONDITION(S):  Patient has a condition that poses threat to life and bodily function: SBO    June was seen today for transitional care.    Diagnoses and all orders for this visit:        Problem List Items Addressed This Visit        Renal/    Acute cystitis without hematuria     Reports compliance with abx regimen  Denies fever, chills on signs of worsening infection  Continue to follow with PCP             GI    Irritable bowel syndrome with both constipation and diarrhea     Continue medication as prescribed  Balanced diet  Bowel Regimen  Keep scheduled follow up with  GI and general surgery         SBO (small bowel obstruction) - Primary     Continue bowel regimen  Keep scheduled follow up with GI and Surgery          Abdominal discomfort     Reports that she feel gas pain  She is currently on bowel regimen  Prescribed simethicone for gas pain  Keep scheduled follow up with GI and general surgery           Other Visit Diagnoses     Irritable bowel syndrome, unspecified type              Were controlled substances prescribed?  No    Instructions:  - [facility] Nurse Practitioner to schedule home follow-up visit with patient as needed.  - Continue all medications, treatments and therapies as ordered.   - Follow all instructions, recommendations as discussed.  - Maintain Safety Precautions at all times.  - Attend all medical appointments as scheduled.  - For worsening symptoms: call Primary Care Physician or Nurse Practitioner.  - For emergencies, call 911 or immediately report to the nearest emergency room.  - Limit Risks of environmental exposure to coronavirus/COVID-19 as discussed including: social distancing, hand hygiene, and limiting departures from the home for necessities only.        Scheduled Follow-up :  Future Appointments   Date Time Provider Department Center   1/24/2022  1:30 PM [facility], [facility] [place]   1/26/2022  4:30 PM [facility] 500 LB LIMIT [facility]   2/8/2022 11:00 AM Leonidas Barriga MD [facility]UR [facility]   3/17/2022 10:00 AM Ramiro Jefferson MD [facility] GASTRO [facility]       After Visit Medication List :     Medication List          Accurate as of January 21, 2022 11:59 PM. If you have any questions, ask your nurse or doctor.            START taking these medications    simethicone 80 MG chewable tablet  Commonly known as: MYLICON  Take 1 tablet (80 mg total) by mouth every 6 (six) hours as needed for Flatulence.  Started by: Camille Munoz NP        CONTINUE taking these medications    amoxicillin 500 MG capsule  Commonly known  as: AMOXIL  Take 1 capsule (500 mg total) by mouth every 12 (twelve) hours. for 7 days     ascorbic Acid 500 mg Cpsr  Commonly known as: VITAMIN C     atorvastatin 40 MG tablet  Commonly known as: LIPITOR  TAKE 1 TABLET BY MOUTH  DAILY     biotin 5,000 mcg Tbdl     butalbital-acetaminophen-caffeine -40 mg -40 mg per tablet  Commonly known as: FIORICET, ESGIC     cholestyramine-aspartame 4 gram Pwpk  Commonly known as: CHOLESTYRAMINE LIGHT  Take 1 packet (4 g total) by mouth daily as needed (Diarrhea).     clotrimazole-betamethasone 1-0.05% cream  Commonly known as: LOTRISONE     co-enzyme Q-10 30 mg capsule     cranberry extract 200 mg Cap     D-MANNOSE ORAL     DULoxetine 30 MG capsule  Commonly known as: CYMBALTA  Take 1 capsule (30 mg total) by mouth 2 (two) times daily.     FREESTYLE EFREN 10 DAY READER Misc  Generic drug: flash glucose scanning reader  TEST as directed     FREESTYLE EFREN 14 DAY SENSOR Kit  Generic drug: flash glucose sensor  1 application by Misc.(Non-Drug; Combo Route) route every 14 (fourteen) days. Disp 30 or 90 day refill     FREESTYLE LITE STRIPS Strp  Generic drug: blood sugar diagnostic  TEST DAILY AS DIRECTED     hydrocortisone 2.5 % cream  Apply topically 2 (two) times daily as needed.     Lactobacillus rhamnosus GG 10 billion cell capsule  Commonly known as: CULTURELLE  Take 1 capsule by mouth once daily.     levothyroxine 75 MCG tablet  Commonly known as: SYNTHROID  Take 1 tablet (75 mcg total) by mouth before breakfast.     magnesium 30 mg Tab     metFORMIN 500 MG tablet  Commonly known as: GLUCOPHAGE  TAKE 1 TABLET(500 MG) BY MOUTH THREE TIMES DAILY WITH MEALS     montelukast 10 mg tablet  Commonly known as: SINGULAIR  TAKE 1 TABLET BY MOUTH  DAILY     MYRBETRIQ 50 mg Tb24  Generic drug: mirabegron  Take 1 tablet (50 mg total) by mouth once daily.     omega 3-dha-epa-fish oil 1,200 (144-216) mg Cap     ondansetron 8 MG Tbdl  Commonly known as: ZOFRAN-ODT  Take 1 tablet  (8 mg total) by mouth every 8 (eight) hours as needed (nausea).     pantoprazole 20 MG tablet  Commonly known as: PROTONIX  Take 1 tablet (20 mg total) by mouth once daily.     polyethylene glycol 17 gram/dose powder  Commonly known as: GLYCOLAX  Take 17 g by mouth before dinner.     PREMARIN vaginal cream  Generic drug: conjugated estrogens  INSERT 1/2 GRAM VAGINALLY  TWICE WEEKLY     promethazine-codeine 6.25-10 mg/5 ml 6.25-10 mg/5 mL syrup  Commonly known as: PHENERGAN with CODEINE  Take 5 mLs by mouth every 4 to 6 hours as needed for Cough.     psyllium husk (aspartame) 3.4 gram Pwpk packet  Commonly known as: METAMUCIL  Take 1 packet by mouth 2 (two) times daily.     senna-docusate 8.6-50 mg 8.6-50 mg per tablet  Commonly known as: PERICOLACE  Take 1 tablet by mouth 2 (two) times daily.     vitamin D 1000 units Tab  Commonly known as: VITAMIN D3           Where to Get Your Medications      These medications were sent to [id number] [address] AT [address] [address]    Phone: [phone]   · simethicone 80 MG chewable tablet         Patient consent was obtained prior to treatment on this visit.    Attestation: Screening criteria to assess the level of the patient's risk for infection with COVID-19 as recommended by the CDC at the time of the above documented home visit concluded appropriateness to proceed. Universal precautions were maintained at all times, including provider use of >60% alcohol gel hand  immediately prior to entry and upon departing the patient's home as well as cleaning of equipment used in home visit with antibacterial/germicidal disposable wipes.     Signature:       N'Jeri Dixon, APRN FNP-C [facility]    Total Face-to-Face Encounter: 60 minutes with >50% of time spent discussing the care with the patient/family.

## 2022-01-23 NOTE — PATIENT INSTRUCTIONS
Instructions:  - [facility] to schedule home follow-up visit with patient as needed.  - Continue all medications, treatments and therapies as ordered.   - Follow all instructions, recommendations as discussed.  - Maintain Safety Precautions at all times.  - Attend all medical appointments as scheduled.  - For worsening symptoms: call Primary Care Physician or Nurse Practitioner.  - For emergencies, call 911 or immediately report to the nearest emergency room.  - Limit Risks of environmental exposure to coronavirus/COVID-19 as discussed including: social distancing, hand hygiene, and limiting departures from the home for necessities only.

## 2022-01-24 ENCOUNTER — LAB VISIT (OUTPATIENT)
Dept: LAB | Facility: HOSPITAL | Age: 77
End: 2022-01-24
Attending: INTERNAL MEDICINE
Payer: MEDICARE

## 2022-01-24 DIAGNOSIS — E03.4 HYPOTHYROIDISM DUE TO ACQUIRED ATROPHY OF THYROID: ICD-10-CM

## 2022-01-24 DIAGNOSIS — E11.9 TYPE 2 DIABETES MELLITUS WITHOUT COMPLICATION, WITHOUT LONG-TERM CURRENT USE OF INSULIN: ICD-10-CM

## 2022-01-24 DIAGNOSIS — D64.9 ANEMIA, UNSPECIFIED TYPE: ICD-10-CM

## 2022-01-24 DIAGNOSIS — E53.8 B12 DEFICIENCY: ICD-10-CM

## 2022-01-24 DIAGNOSIS — K90.9 INTESTINAL MALABSORPTION, UNSPECIFIED TYPE: ICD-10-CM

## 2022-01-24 DIAGNOSIS — E53.8 VITAMIN B12 DEFICIENCY: ICD-10-CM

## 2022-01-24 LAB
25(OH)D3+25(OH)D2 SERPL-MCNC: 35 NG/ML (ref 30–96)
ALBUMIN SERPL BCP-MCNC: 3.6 G/DL (ref 3.5–5.2)
ALP SERPL-CCNC: 87 U/L (ref 55–135)
ALT SERPL W/O P-5'-P-CCNC: 42 U/L (ref 10–44)
ANION GAP SERPL CALC-SCNC: 11 MMOL/L (ref 8–16)
ANISOCYTOSIS BLD QL SMEAR: SLIGHT
AST SERPL-CCNC: 47 U/L (ref 10–40)
BASOPHILS # BLD AUTO: 0.06 K/UL (ref 0–0.2)
BASOPHILS NFR BLD: 1.1 % (ref 0–1.9)
BILIRUB SERPL-MCNC: 1.1 MG/DL (ref 0.1–1)
BUN SERPL-MCNC: 14 MG/DL (ref 8–23)
CALCIUM SERPL-MCNC: 9.7 MG/DL (ref 8.7–10.5)
CHLORIDE SERPL-SCNC: 98 MMOL/L (ref 95–110)
CO2 SERPL-SCNC: 23 MMOL/L (ref 23–29)
CREAT SERPL-MCNC: 0.9 MG/DL (ref 0.5–1.4)
DIFFERENTIAL METHOD: ABNORMAL
EOSINOPHIL # BLD AUTO: 0.3 K/UL (ref 0–0.5)
EOSINOPHIL NFR BLD: 5.1 % (ref 0–8)
ERYTHROCYTE [DISTWIDTH] IN BLOOD BY AUTOMATED COUNT: 13.7 % (ref 11.5–14.5)
EST. GFR  (AFRICAN AMERICAN): >60 ML/MIN/1.73 M^2
EST. GFR  (NON AFRICAN AMERICAN): >60 ML/MIN/1.73 M^2
ESTIMATED AVG GLUCOSE: 151 MG/DL (ref 68–131)
FERRITIN SERPL-MCNC: 455 NG/ML (ref 20–300)
GLUCOSE SERPL-MCNC: 246 MG/DL (ref 70–110)
HBA1C MFR BLD: 6.9 % (ref 4–5.6)
HCT VFR BLD AUTO: 36.8 % (ref 37–48.5)
HGB BLD-MCNC: 11.8 G/DL (ref 12–16)
HYPOCHROMIA BLD QL SMEAR: ABNORMAL
IMM GRANULOCYTES # BLD AUTO: 0.04 K/UL (ref 0–0.04)
IMM GRANULOCYTES NFR BLD AUTO: 0.7 % (ref 0–0.5)
IRON SERPL-MCNC: 112 UG/DL (ref 30–160)
LYMPHOCYTES # BLD AUTO: 1.6 K/UL (ref 1–4.8)
LYMPHOCYTES NFR BLD: 27.9 % (ref 18–48)
MAGNESIUM SERPL-MCNC: 1.6 MG/DL (ref 1.6–2.6)
MCH RBC QN AUTO: 32.8 PG (ref 27–31)
MCHC RBC AUTO-ENTMCNC: 32.1 G/DL (ref 32–36)
MCV RBC AUTO: 102 FL (ref 82–98)
MONOCYTES # BLD AUTO: 0.5 K/UL (ref 0.3–1)
MONOCYTES NFR BLD: 8.4 % (ref 4–15)
NEUTROPHILS # BLD AUTO: 3.2 K/UL (ref 1.8–7.7)
NEUTROPHILS NFR BLD: 56.8 % (ref 38–73)
NRBC BLD-RTO: 0 /100 WBC
OVALOCYTES BLD QL SMEAR: ABNORMAL
PLATELET # BLD AUTO: 85 K/UL (ref 150–450)
PLATELET BLD QL SMEAR: ABNORMAL
PMV BLD AUTO: 11.6 FL (ref 9.2–12.9)
POIKILOCYTOSIS BLD QL SMEAR: SLIGHT
POLYCHROMASIA BLD QL SMEAR: ABNORMAL
POTASSIUM SERPL-SCNC: 4.8 MMOL/L (ref 3.5–5.1)
PROT SERPL-MCNC: 7.6 G/DL (ref 6–8.4)
RBC # BLD AUTO: 3.6 M/UL (ref 4–5.4)
SATURATED IRON: 28 % (ref 20–50)
SODIUM SERPL-SCNC: 132 MMOL/L (ref 136–145)
TOTAL IRON BINDING CAPACITY: 394 UG/DL (ref 250–450)
TRANSFERRIN SERPL-MCNC: 266 MG/DL (ref 200–375)
TSH SERPL DL<=0.005 MIU/L-ACNC: 1.13 UIU/ML (ref 0.4–4)
WBC # BLD AUTO: 5.7 K/UL (ref 3.9–12.7)

## 2022-01-24 PROCEDURE — 83735 ASSAY OF MAGNESIUM: CPT | Performed by: INTERNAL MEDICINE

## 2022-01-24 PROCEDURE — 83036 HEMOGLOBIN GLYCOSYLATED A1C: CPT | Performed by: INTERNAL MEDICINE

## 2022-01-24 PROCEDURE — 84466 ASSAY OF TRANSFERRIN: CPT | Performed by: INTERNAL MEDICINE

## 2022-01-24 PROCEDURE — 80053 COMPREHEN METABOLIC PANEL: CPT | Performed by: INTERNAL MEDICINE

## 2022-01-24 PROCEDURE — 36415 COLL VENOUS BLD VENIPUNCTURE: CPT | Mod: PO | Performed by: INTERNAL MEDICINE

## 2022-01-24 PROCEDURE — 84443 ASSAY THYROID STIM HORMONE: CPT | Performed by: INTERNAL MEDICINE

## 2022-01-24 PROCEDURE — 82728 ASSAY OF FERRITIN: CPT | Performed by: INTERNAL MEDICINE

## 2022-01-24 PROCEDURE — 82306 VITAMIN D 25 HYDROXY: CPT | Performed by: INTERNAL MEDICINE

## 2022-01-24 PROCEDURE — 85025 COMPLETE CBC W/AUTO DIFF WBC: CPT | Performed by: INTERNAL MEDICINE

## 2022-01-25 ENCOUNTER — NURSE TRIAGE (OUTPATIENT)
Dept: ADMINISTRATIVE | Facility: CLINIC | Age: 77
End: 2022-01-25
Payer: MEDICARE

## 2022-01-25 ENCOUNTER — HOSPITAL ENCOUNTER (EMERGENCY)
Facility: HOSPITAL | Age: 77
Discharge: HOME | End: 2022-01-26
Attending: EMERGENCY MEDICINE
Payer: MEDICARE

## 2022-01-25 DIAGNOSIS — R74.01 TRANSAMINITIS: ICD-10-CM

## 2022-01-25 DIAGNOSIS — R10.9 ABDOMINAL PAIN, UNSPECIFIED ABDOMINAL LOCATION: Primary | ICD-10-CM

## 2022-01-25 LAB
BASOPHILS # BLD AUTO: 0.03 K/UL (ref 0–0.2)
BASOPHILS NFR BLD: 0.4 % (ref 0–1.9)
BUN SERPL-MCNC: 16 MG/DL (ref 6–30)
CHLORIDE SERPL-SCNC: 99 MMOL/L (ref 95–110)
CREAT SERPL-MCNC: 0.8 MG/DL (ref 0.5–1.4)
DIFFERENTIAL METHOD: ABNORMAL
EOSINOPHIL # BLD AUTO: 0.2 K/UL (ref 0–0.5)
EOSINOPHIL NFR BLD: 2.9 % (ref 0–8)
ERYTHROCYTE [DISTWIDTH] IN BLOOD BY AUTOMATED COUNT: 13.7 % (ref 11.5–14.5)
GLUCOSE SERPL-MCNC: 229 MG/DL (ref 70–110)
HCT VFR BLD AUTO: 34.4 % (ref 37–48.5)
HCT VFR BLD CALC: 36 %PCV (ref 36–54)
HGB BLD-MCNC: 11.1 G/DL (ref 12–16)
IMM GRANULOCYTES # BLD AUTO: 0.03 K/UL (ref 0–0.04)
IMM GRANULOCYTES NFR BLD AUTO: 0.4 % (ref 0–0.5)
LACTATE SERPL-SCNC: 1.7 MMOL/L (ref 0.5–2.2)
LYMPHOCYTES # BLD AUTO: 0.8 K/UL (ref 1–4.8)
LYMPHOCYTES NFR BLD: 10 % (ref 18–48)
MCH RBC QN AUTO: 32.6 PG (ref 27–31)
MCHC RBC AUTO-ENTMCNC: 32.3 G/DL (ref 32–36)
MCV RBC AUTO: 101 FL (ref 82–98)
MONOCYTES # BLD AUTO: 0.7 K/UL (ref 0.3–1)
MONOCYTES NFR BLD: 7.9 % (ref 4–15)
NEUTROPHILS # BLD AUTO: 6.5 K/UL (ref 1.8–7.7)
NEUTROPHILS NFR BLD: 78.4 % (ref 38–73)
NRBC BLD-RTO: 0 /100 WBC
PLATELET # BLD AUTO: 91 K/UL (ref 150–450)
PMV BLD AUTO: 11.7 FL (ref 9.2–12.9)
POC IONIZED CALCIUM: 1.18 MMOL/L (ref 1.06–1.42)
POC TCO2 (MEASURED): 29 MMOL/L (ref 23–29)
POTASSIUM BLD-SCNC: 4.4 MMOL/L (ref 3.5–5.1)
RBC # BLD AUTO: 3.41 M/UL (ref 4–5.4)
SAMPLE: ABNORMAL
SODIUM BLD-SCNC: 136 MMOL/L (ref 136–145)
WBC # BLD AUTO: 8.23 K/UL (ref 3.9–12.7)

## 2022-01-25 PROCEDURE — 80047 BASIC METABLC PNL IONIZED CA: CPT

## 2022-01-25 PROCEDURE — 96361 HYDRATE IV INFUSION ADD-ON: CPT

## 2022-01-25 PROCEDURE — 96376 TX/PRO/DX INJ SAME DRUG ADON: CPT

## 2022-01-25 PROCEDURE — 83605 ASSAY OF LACTIC ACID: CPT | Performed by: STUDENT IN AN ORGANIZED HEALTH CARE EDUCATION/TRAINING PROGRAM

## 2022-01-25 PROCEDURE — 85025 COMPLETE CBC W/AUTO DIFF WBC: CPT | Performed by: EMERGENCY MEDICINE

## 2022-01-25 PROCEDURE — 63600175 PHARM REV CODE 636 W HCPCS: Performed by: EMERGENCY MEDICINE

## 2022-01-25 PROCEDURE — 83690 ASSAY OF LIPASE: CPT | Performed by: EMERGENCY MEDICINE

## 2022-01-25 PROCEDURE — 25500020 PHARM REV CODE 255: Performed by: EMERGENCY MEDICINE

## 2022-01-25 PROCEDURE — 99284 PR EMERGENCY DEPT VISIT,LEVEL IV: ICD-10-PCS | Mod: ,,, | Performed by: EMERGENCY MEDICINE

## 2022-01-25 PROCEDURE — 99285 EMERGENCY DEPT VISIT HI MDM: CPT | Mod: 25

## 2022-01-25 PROCEDURE — 25000003 PHARM REV CODE 250: Performed by: EMERGENCY MEDICINE

## 2022-01-25 PROCEDURE — 80053 COMPREHEN METABOLIC PANEL: CPT | Performed by: EMERGENCY MEDICINE

## 2022-01-25 PROCEDURE — 96374 THER/PROPH/DIAG INJ IV PUSH: CPT

## 2022-01-25 PROCEDURE — 99284 EMERGENCY DEPT VISIT MOD MDM: CPT | Mod: ,,, | Performed by: EMERGENCY MEDICINE

## 2022-01-25 RX ORDER — MORPHINE SULFATE 2 MG/ML
2 INJECTION, SOLUTION INTRAMUSCULAR; INTRAVENOUS
Status: COMPLETED | OUTPATIENT
Start: 2022-01-25 | End: 2022-01-25

## 2022-01-25 RX ADMIN — MORPHINE SULFATE 2 MG: 2 INJECTION, SOLUTION INTRAMUSCULAR; INTRAVENOUS at 10:01

## 2022-01-25 RX ADMIN — SODIUM CHLORIDE 1000 ML: 0.9 INJECTION, SOLUTION INTRAVENOUS at 10:01

## 2022-01-25 RX ADMIN — IOHEXOL 75 ML: 350 INJECTION, SOLUTION INTRAVENOUS at 11:01

## 2022-01-25 NOTE — PROVIDER PROGRESS NOTES - EMERGENCY DEPT.
Encounter Date: 1/12/2022    ED Physician Progress Notes             I assumed care of this patient at change of shift from Dr. Cummings. Briefly, this is a 77 y.o. 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 CT scan here showed developing SBO by my independent interpretation, though on review it is mostly similar to prior, though now with higher concern for transition point.     I discussed the patient with general surgery who recommended admission, clear liquids, but did not desire to be primary service. I discussed the patient with hospital medicine who accepted her for observation.     Workup notable for:     Labs Reviewed   CBC W/ AUTO DIFFERENTIAL - Abnormal; Notable for the following components:       Result Value    RBC 3.70 (*)      (*)     MCH 32.4 (*)     Platelets 104 (*)     All other components within normal limits   COMPREHENSIVE METABOLIC PANEL - Abnormal; Notable for the following components:    Sodium 134 (*)     Glucose 148 (*)     Total Bilirubin 1.6 (*)     Anion Gap 6 (*)     All other components within normal limits   URINALYSIS, REFLEX TO URINE CULTURE - Abnormal; Notable for the following components:    Appearance, UA Hazy (*)     Leukocytes, UA 3+ (*)     All other components within normal limits    Narrative:     Specimen Source->Urine   URINALYSIS MICROSCOPIC - Abnormal; Notable for the following components:    RBC, UA 6 (*)     WBC, UA >100 (*)     All other components within normal limits    Narrative:     Specimen Source->Urine   ISTAT PROCEDURE - Abnormal; Notable for the following components:    POC Glucose 146 (*)     POC Hematocrit 35 (*)     All other components within normal limits   SARS-COV-2 RDRP GENE - Normal    Narrative:     This test utilizes isothermal nucleic acid amplification   technology to detect the SARS-CoV-2 RdRp nucleic acid segment.   The analytical sensitivity (limit of  detection) is 125 genome   equivalents/mL.   A POSITIVE result implies infection with the SARS-CoV-2 virus;   the patient is presumed to be contagious.     A NEGATIVE result means that SARS-CoV-2 nucleic acids are not   present above the limit of detection. A NEGATIVE result should be   treated as presumptive. It does not rule out the possibility of   COVID-19 and should not be the sole basis for treatment decisions.   If COVID-19 is strongly suspected based on clinical and exposure   history, re-testing using an alternate molecular assay should be   considered.   This test is only for use under the Food and Drug   Administration s Emergency Use Authorization (EUA).   Commercial kits are provided by [employer]   Performance characteristics of the EUA have been independently   verified by [facility] Department of   Pathology and Laboratory Medicine.   _________________________________________________________________   The authorized Fact Sheet for Healthcare Providers and the authorized Fact   Sheet for Patients of the ID NOW COVID-19 are available on the FDA   website:     https://www.fda.gov/media/799525/download  https://www.fda.gov/media/365708/download       LIPASE   LACTIC ACID, PLASMA     FL Small Bowel Follow Through   Final Result      Small-bowel follow-through with few mildly dilated loops of small bowel and normal transit time.  No evidence of high-grade obstruction.      Electronically signed by resident: Zana Wilson   Date:    01/14/2022   Time:    15:47      Electronically signed by: Rodolfo Aguilera   Date:    01/14/2022   Time:    16:15      CT Abdomen Pelvis With Contrast   Final Result   Abnormal      Segment of dilated small bowel with fecalization of the luminal material measuring up to 3.8 cm with likely transition point in the right lower quadrant as described above.  Findings likely represent partial small bowel obstruction.  Increased soft tissue and close approximation  of the small bowel loops to the anterior abdominal wall likely representing adhesions.      Lateralization of the position of the small bowel in regards to the ascending colon potentially representing internal hernia.      Stable mild right hydronephrosis.      Splenomegaly.      Diffusely hypoattenuating liver suggestive of steatosis.  Consider correlation with LFTs.  Stable nonspecific enhancing focus within the right hepatic dome with potentially flash filling hemangioma.      Increased soft tissue about the celiac axis and SMA as well as multiple mesenteric and olga hepatis nodes.      Additional findings as above.      This report was flagged in Epic as abnormal.      Electronically signed by resident: Primo Stoddard   Date:    01/12/2022   Time:    15:23      Electronically signed by: Guerrero Busch MD   Date:    01/12/2022   Time:    16:09          Medications   0.9%  NaCl infusion ( Intravenous New Bag 1/12/22 1717)   ondansetron injection 4 mg (4 mg Intravenous Given 1/12/22 1230)   dicyclomine capsule 10 mg (10 mg Oral Given 1/12/22 1324)   morphine injection 2 mg (2 mg Intravenous Given 1/12/22 1447)   iohexoL (OMNIPAQUE 350) injection 75 mL (75 mLs Intravenous Given 1/12/22 1500)   cefTRIAXone (ROCEPHIN) 1 g/50 mL D5W IVPB (0 g Intravenous Stopped 1/12/22 1624)   morphine injection 2 mg (2 mg Intravenous Given 1/12/22 1718)   polyethylene glycol packet 17 g (17 g Oral Given 1/12/22 2055)   iohexoL (OMNIPAQUE 350) injection 200 mL (200 mLs Oral Given 1/14/22 1409)   potassium chloride SA CR tablet 40 mEq (40 mEq Oral Given 1/15/22 1335)   magnesium sulfate 2g in water 50mL IVPB (premix) (0 g Intravenous Stopped 1/15/22 1521)         Final diagnoses:  [R10.9] Abdominal pain  [K56.600] Partial small bowel obstruction (Primary)  [N30.01] Acute cystitis with hematuria

## 2022-01-26 ENCOUNTER — TELEPHONE (OUTPATIENT)
Dept: GASTROENTEROLOGY | Facility: CLINIC | Age: 77
End: 2022-01-26
Payer: MEDICARE

## 2022-01-26 ENCOUNTER — HOSPITAL ENCOUNTER (OUTPATIENT)
Dept: RADIOLOGY | Facility: HOSPITAL | Age: 77
Discharge: HOME | End: 2022-01-26
Attending: INTERNAL MEDICINE
Payer: MEDICARE

## 2022-01-26 VITALS
DIASTOLIC BLOOD PRESSURE: 55 MMHG | WEIGHT: 130 LBS | SYSTOLIC BLOOD PRESSURE: 98 MMHG | TEMPERATURE: 98 F | OXYGEN SATURATION: 95 % | RESPIRATION RATE: 18 BRPM | BODY MASS INDEX: 23.92 KG/M2 | HEIGHT: 62 IN | HEART RATE: 100 BPM

## 2022-01-26 DIAGNOSIS — K59.09 CONSTIPATION, CHRONIC: ICD-10-CM

## 2022-01-26 DIAGNOSIS — Z85.038 HISTORY OF COLON CANCER: ICD-10-CM

## 2022-01-26 DIAGNOSIS — D12.6 COLON ADENOMA: ICD-10-CM

## 2022-01-26 DIAGNOSIS — D64.9 LOW HEMOGLOBIN: ICD-10-CM

## 2022-01-26 DIAGNOSIS — D46.9 MYELODYSPLASTIC SYNDROME: ICD-10-CM

## 2022-01-26 PROBLEM — K56.609 SBO (SMALL BOWEL OBSTRUCTION): Chronic | Status: ACTIVE | Noted: 2020-03-11

## 2022-01-26 PROBLEM — N81.6 RECTOCELE: Chronic | Status: ACTIVE | Noted: 2019-05-22

## 2022-01-26 PROBLEM — I82.629 ACUTE DEEP VEIN THROMBOSIS (DVT) OF UPPER EXTREMITY: Chronic | Status: ACTIVE | Noted: 2017-04-15

## 2022-01-26 LAB
ALBUMIN SERPL BCP-MCNC: 3.6 G/DL (ref 3.5–5.2)
ALP SERPL-CCNC: 100 U/L (ref 55–135)
ALT SERPL W/O P-5'-P-CCNC: 66 U/L (ref 10–44)
ANION GAP SERPL CALC-SCNC: 11 MMOL/L (ref 8–16)
AST SERPL-CCNC: 81 U/L (ref 10–40)
BILIRUB SERPL-MCNC: 1.9 MG/DL (ref 0.1–1)
BILIRUB UR QL STRIP: NEGATIVE
BUN SERPL-MCNC: 13 MG/DL (ref 8–23)
CALCIUM SERPL-MCNC: 10 MG/DL (ref 8.7–10.5)
CHLORIDE SERPL-SCNC: 99 MMOL/L (ref 95–110)
CLARITY UR REFRACT.AUTO: CLEAR
CO2 SERPL-SCNC: 24 MMOL/L (ref 23–29)
COLOR UR AUTO: NORMAL
CREAT SERPL-MCNC: 0.8 MG/DL (ref 0.5–1.4)
EST. GFR  (AFRICAN AMERICAN): >60 ML/MIN/1.73 M^2
EST. GFR  (NON AFRICAN AMERICAN): >60 ML/MIN/1.73 M^2
GLUCOSE SERPL-MCNC: 228 MG/DL (ref 70–110)
GLUCOSE UR QL STRIP: NEGATIVE
HGB UR QL STRIP: NEGATIVE
KETONES UR QL STRIP: NEGATIVE
LEUKOCYTE ESTERASE UR QL STRIP: NEGATIVE
LIPASE SERPL-CCNC: 22 U/L (ref 4–60)
NITRITE UR QL STRIP: NEGATIVE
PH UR STRIP: 7 [PH] (ref 5–8)
POTASSIUM SERPL-SCNC: 4.4 MMOL/L (ref 3.5–5.1)
PROT SERPL-MCNC: 7.3 G/DL (ref 6–8.4)
PROT UR QL STRIP: NEGATIVE
SODIUM SERPL-SCNC: 134 MMOL/L (ref 136–145)
SP GR UR STRIP: 1.02 (ref 1–1.03)
URN SPEC COLLECT METH UR: NORMAL

## 2022-01-26 PROCEDURE — 81003 URINALYSIS AUTO W/O SCOPE: CPT | Performed by: EMERGENCY MEDICINE

## 2022-01-26 PROCEDURE — 63600175 PHARM REV CODE 636 W HCPCS: Performed by: STUDENT IN AN ORGANIZED HEALTH CARE EDUCATION/TRAINING PROGRAM

## 2022-01-26 PROCEDURE — 25000003 PHARM REV CODE 250: Performed by: STUDENT IN AN ORGANIZED HEALTH CARE EDUCATION/TRAINING PROGRAM

## 2022-01-26 PROCEDURE — 25500020 PHARM REV CODE 255: Performed by: INTERNAL MEDICINE

## 2022-01-26 PROCEDURE — 74177 CT ABD & PELVIS W/CONTRAST: CPT | Mod: TC

## 2022-01-26 PROCEDURE — 74177 CT ABD & PELVIS W/CONTRAST: CPT | Mod: 26,,, | Performed by: RADIOLOGY

## 2022-01-26 PROCEDURE — 96376 TX/PRO/DX INJ SAME DRUG ADON: CPT

## 2022-01-26 PROCEDURE — 74177 CT ENTEROGRAPHY ABD_PELVIS WITH CONTRAST: ICD-10-PCS | Mod: 26,,, | Performed by: RADIOLOGY

## 2022-01-26 RX ORDER — DICYCLOMINE HYDROCHLORIDE 10 MG/1
20 CAPSULE ORAL
Status: COMPLETED | OUTPATIENT
Start: 2022-01-26 | End: 2022-01-26

## 2022-01-26 RX ORDER — MORPHINE SULFATE 4 MG/ML
4 INJECTION, SOLUTION INTRAMUSCULAR; INTRAVENOUS
Status: COMPLETED | OUTPATIENT
Start: 2022-01-26 | End: 2022-01-26

## 2022-01-26 RX ORDER — DICYCLOMINE HYDROCHLORIDE 20 MG/1
20 TABLET ORAL 2 TIMES DAILY
Qty: 30 TABLET | Refills: 0 | Status: ON HOLD | OUTPATIENT
Start: 2022-01-26 | End: 2022-02-12 | Stop reason: HOSPADM

## 2022-01-26 RX ORDER — MELOXICAM 7.5 MG/1
7.5 TABLET ORAL DAILY
Qty: 5 TABLET | Refills: 0 | Status: SHIPPED | OUTPATIENT
Start: 2022-01-26 | End: 2022-01-31

## 2022-01-26 RX ADMIN — MORPHINE SULFATE 4 MG: 4 INJECTION INTRAVENOUS at 02:01

## 2022-01-26 RX ADMIN — DICYCLOMINE HYDROCHLORIDE 20 MG: 10 CAPSULE ORAL at 12:01

## 2022-01-26 RX ADMIN — IOHEXOL 125 ML: 350 INJECTION, SOLUTION INTRAVENOUS at 05:01

## 2022-01-26 NOTE — TELEPHONE ENCOUNTER
Pt reports a long history of bowel issues, has been having abdominal pain all day, thought she was possibly constipated, but she did have a normal/good BM earlier, but her abdominal pain has worsened to severe now, Pt advised to be seen in ED now per protocol, Pt verbalized understanding.    Reason for Disposition   [1] SEVERE pain (e.g., excruciating) AND [2] present > 1 hour    Additional Information   Negative: Shock suspected (e.g., cold/pale/clammy skin, too weak to stand, low BP, rapid pulse)   Negative: Difficult to awaken or acting confused (e.g., disoriented, slurred speech)   Negative: Passed out (i.e., lost consciousness, collapsed and was not responding)   Negative: Sounds like a life-threatening emergency to the triager    Protocols used: ABDOMINAL PAIN - FEMALE-A-AH

## 2022-01-26 NOTE — DISCHARGE INSTRUCTIONS
Diagnosis: Abdominal pain    Tests you had showed:   Labs Reviewed   COMPREHENSIVE METABOLIC PANEL - Abnormal; Notable for the following components:       Result Value    Sodium 134 (*)     Glucose 228 (*)     Total Bilirubin 1.9 (*)     AST 81 (*)     ALT 66 (*)     All other components within normal limits   CBC W/ AUTO DIFFERENTIAL - Abnormal; Notable for the following components:    RBC 3.41 (*)     Hemoglobin 11.1 (*)     Hematocrit 34.4 (*)      (*)     MCH 32.6 (*)     Platelets 91 (*)     Lymph # 0.8 (*)     Gran % 78.4 (*)     Lymph % 10.0 (*)     All other components within normal limits   ISTAT PROCEDURE - Abnormal; Notable for the following components:    POC Glucose 229 (*)     All other components within normal limits   LIPASE   LACTIC ACID, PLASMA   URINALYSIS, REFLEX TO URINE CULTURE   ISTAT CHEM8      CT Abdomen Pelvis With Contrast   Final Result      1. Multiple loops of small bowel with mild wall thickening could represent inflammatory or infectious process.  Follow-up recommended.   2. Hepatic steatosis.   3. Multilevel chronic and degenerative changes of the lumbar spine.   4. Hepatic steatosis.   5. Stable mildly increased density in the mesentery adjacent to the proximal celiac and SMA, similar to the prior study.   6.      Electronically signed by: Moris Polo   Date:    01/25/2022   Time:    23:26          Treatments you received were:   Medications   morphine injection 2 mg (2 mg Intravenous Given 1/25/22 2235)   sodium chloride 0.9% bolus 1,000 mL (0 mLs Intravenous Stopped 1/26/22 0029)   iohexoL (OMNIPAQUE 350) injection 75 mL (75 mLs Intravenous Given 1/25/22 2303)   dicyclomine capsule 20 mg (20 mg Oral Given 1/26/22 0033)       Home Care Instructions:  - Medications: Continue taking your home medications as prescribed    Follow-Up Plan:  - Follow-up with: Primary care doctor within 3 - 5  days  - Additional testing and/or evaluation will be directed by your primary  doctor    Return to the Emergency Department for symptoms including but not limited to: worsening symptoms, severe back pain, shortness of breath or chest pain, vomiting with inability to hold down fluids, blood in vomit or poop, fevers greater than 100.4°F, passing out/fainting/unconsciousness, or other concerning symptoms.

## 2022-01-26 NOTE — TELEPHONE ENCOUNTER
----- Message from Alysa Malone sent at 1/26/2022  2:34 PM CST -----  Contact: Keya (caregiver) @ [phone]  Pt is having severe abdominal pain and needs to be seen asap.  She is currently schedule dfor the 1st available on 3-17-22.  Pls call with a sooner appt.

## 2022-01-26 NOTE — PROVIDER PROGRESS NOTES - EMERGENCY DEPT.
"Encounter Date: 1/25/2022    ED Physician Progress Notes        Physician Note:   Medical screening exam completed.  I have conducted a focused provider triage encounter, findings are as follows:    Brief history of present illness:  Abdominal pain, hx of SBOs 10/10, points to her upper abdomen.     ---------------------------               01/25/22 2050         ---------------------------   BP:        (!) 168/67       BP Location:    Right arm       Patient Position:     Sitting        Pulse:         86           Resp:          18           Temp:   98.1 °F (36.7 °C)   TempSrc:      Oral          SpO2:         100%          Weight:  59 kg (130 lb)     Height:  5' 2" (1.575 m)   ---------------------------    Pertinent physical exam:  NAD    Brief workup plan:  Labs, pain control, CTAP.     Preliminary workup initiated; this workup will be continued and followed by the physician or advanced practice provider that is assigned to the patient when roomed.       "

## 2022-01-26 NOTE — ED PROVIDER NOTES
"Encounter Date: 2022       History     Chief Complaint   Patient presents with    Abdominal Pain     Generalized abd pain started around noon, many abd surgeries, lbm today, denies NV     Patient is a 77-year-old female with a past medical history of colon cancer s/p resection , multiple episodes of SBO, IBS, HLD, T2 DM here with 1 day of worsening abdominal pain.  Patient states that she generally has intermittent cramping abdominal pain but today the pain has been constant.  It is located in her lower abdomen and around her navel and is worse with eating.  Patient states she has had multiple bowel movements today but that they were "slimy" and associated with significant straining.  Also endorses 1 day of associated headache that patient feels is secondary to her pain.  Denies blood in stool, fever, nausea, vomiting, dysuria, hematuria.    The history is provided by the patient, a relative and medical records. No  was used.     Review of patient's allergies indicates:   Allergen Reactions    Dilaudid [hydromorphone (bulk)] Other (See Comments)     Oversedating, head burning. Pt prefers to avoid.       Codeine Nausea And Vomiting     Other reaction(s): Itching    Dilaudid [hydromorphone]     Percocet  [oxycodone-acetaminophen]      Other reaction(s): Itching    Sulfa (sulfonamide antibiotics)      Other reaction(s): Nausea  Other reaction(s): Itching     Past Medical History:   Diagnosis Date    Abdominal pain     Allergic rhinitis     Arthritis     Blood platelet disorder     Blood platelet disorder     Blood transfusion     during delivery and     Bowel obstruction     Cervical radiculopathy     followed by dr cloud    Colon cancer     transverse colon; resected; Stage IIA (pT3 pN0 MX)    Diabetes mellitus     Diarrhea     Falls 2020    Family history of breast cancer     Family history of colon cancer     Fatty liver     GERD (gastroesophageal " reflux disease)     History of shingles     Hyperlipidemia     Hypothyroidism     Irritable bowel syndrome     Microscopic colitis     treated 2013    Raynaud phenomenon     Raynaud's disease     Type 2 diabetes mellitus      Past Surgical History:   Procedure Laterality Date    APPENDECTOMY      BACK SURGERY      CARPAL TUNNEL RELEASE      bilateral      SECTION      CHOLECYSTECTOMY  1965    COLECTOMY  2011    Transverse colon resection by Dr. Aguirre    COLONOSCOPY N/A 2017    Procedure: COLONOSCOPY;  Surgeon: Manjit Alvarez MD;  Location: [facility] (4TH FLR);  Service: Endoscopy;  Laterality: N/A;    COLONOSCOPY N/A 2019    Procedure: COLONOSCOPY;  Surgeon: Ramiro Jefferson MD;  Location: [facility] (4TH FLR);  Service: Endoscopy;  Laterality: N/A;  PM prep    CYSTOSCOPY N/A 2021    Procedure: CYSTOSCOPY;  Surgeon: Viridiana Valenzuela MD;  [facility] 1[facility];  Service: Urology;  Laterality: N/A;    ENDOSCOPIC ULTRASOUND OF UPPER GASTROINTESTINAL TRACT N/A 2018    Procedure: ULTRASOUND, UPPER GI TRACT, ENDOSCOPIC;  Surgeon: Jose Hess MD;  Location: [facility];  Service: Endoscopy;  Laterality: N/A;    ENDOSCOPIC ULTRASOUND OF UPPER GASTROINTESTINAL TRACT N/A 2019    Procedure: ULTRASOUND, UPPER GI TRACT, ENDOSCOPIC;  Surgeon: Jose Hess MD;  Location: [facility] (2ND FLR);  Service: Endoscopy;  Laterality: N/A;    ESOPHAGOGASTRODUODENOSCOPY N/A 2018    Procedure: EGD (ESOPHAGOGASTRODUODENOSCOPY);  Surgeon: Angelo Reynolds MD;  Location: [facility] (2ND FLR);  Service: Endoscopy;  Laterality: N/A;    ESOPHAGOGASTRODUODENOSCOPY N/A 2019    Procedure: EGD (ESOPHAGOGASTRODUODENOSCOPY);  Surgeon: Ramiro Jefferson MD;  Location: [facility] (4TH FLR);  Service: Endoscopy;  Laterality: N/A;    EYE SURGERY      Cataract Removal    FLUOROSCOPIC URODYNAMIC STUDY N/A 2021    Procedure: URODYNAMIC STUDY, FLUOROSCOPIC;  Surgeon:  Viridiana Valenzuela MD;  Location: [facility] OR [address] [place] [address];  Service: Urology;  Laterality: N/A;  90 minutes     HYSTERECTOMY      posterolateral fusion with autograft bone and [place] mineralized bone matrix  2/1/13    at [facility] for lumbar spine stenosis    TOE SURGERY      TONSILLECTOMY      TRIGGER FINGER RELEASE       Family History   Problem Relation Age of Onset    Heart disease Father 50        Mi age 50    Colon cancer Father     Bladder Cancer Mother         non smoker    Cataracts Mother     Glaucoma Mother     Heart disease Mother     Hyperlipidemia Mother     Kidney disease Mother     Breast cancer Sister 79    Arthritis Daughter     Asthma Daughter     Depression Daughter     Hypertension Daughter     Stroke Daughter 40    Arthritis Brother     Colon cancer Brother 70    Alcohol abuse Brother     Parkinsonism Brother     Alcohol abuse Brother     Depression Daughter     Celiac disease Neg Hx     Cirrhosis Neg Hx     Colon polyps Neg Hx     Crohn's disease Neg Hx     Cystic fibrosis Neg Hx     Esophageal cancer Neg Hx     Hemochromatosis Neg Hx     Inflammatory bowel disease Neg Hx     Irritable bowel syndrome Neg Hx     Liver cancer Neg Hx     Liver disease Neg Hx     Rectal cancer Neg Hx     Stomach cancer Neg Hx     Ulcerative colitis Neg Hx     Eliazar's disease Neg Hx     Amblyopia Neg Hx     Blindness Neg Hx     Macular degeneration Neg Hx     Retinal detachment Neg Hx     Strabismus Neg Hx     Melanoma Neg Hx      Social History     Tobacco Use    Smoking status: Never Smoker    Smokeless tobacco: Never Used   Substance Use Topics    Alcohol use: Yes     Comment: socially, hardly ever    Drug use: No     Review of Systems   Constitutional: Negative for chills and fever.   HENT: Negative for congestion and rhinorrhea.    Respiratory: Negative for cough and shortness of breath.    Cardiovascular: Negative for chest pain and leg swelling.   Gastrointestinal:  Positive for abdominal distention and abdominal pain. Negative for blood in stool, nausea and vomiting.   Genitourinary: Negative for dysuria and hematuria.   Musculoskeletal: Negative for back pain.   Skin: Negative for rash and wound.   Neurological: Positive for headaches. Negative for syncope.   Psychiatric/Behavioral: Negative for agitation and confusion.       Physical Exam     Initial Vitals [01/25/22 2050]   BP Pulse Resp Temp SpO2   (!) 168/67 86 18 98.1 °F (36.7 °C) 100 %      MAP       --         Physical Exam    Nursing note and vitals reviewed.  Constitutional: She is not diaphoretic.  Non-toxic appearance.   Nontoxic-appearing woman sitting on ED bed.   HENT:   Head: Normocephalic and atraumatic.   Mucous membranes moist.   Eyes: EOM are normal.   Neck: Neck supple.   Normal range of motion.  Cardiovascular: Normal rate, regular rhythm, normal heart sounds and intact distal pulses. Exam reveals no gallop and no friction rub.    No murmur heard.  Pulmonary/Chest: Breath sounds normal. No respiratory distress. She has no wheezes. She has no rhonchi. She has no rales.   Abdominal: Abdomen is soft. She exhibits no distension.   Mild bilateral lower abdominal tenderness. There is guarding.   Musculoskeletal:      Cervical back: Normal range of motion and neck supple.      Comments: No obvious deformity to extremities.  No lower extremity edema.     Neurological: She is alert and oriented to person, place, and time.   Moves all extremities well and equally.  Cranial nerves 2-12 grossly intact.   Skin: Skin is warm and dry.   Psychiatric: She has a normal mood and affect. Thought content normal.         ED Course   Procedures  Labs Reviewed   COMPREHENSIVE METABOLIC PANEL - Abnormal; Notable for the following components:       Result Value    Sodium 134 (*)     Glucose 228 (*)     Total Bilirubin 1.9 (*)     AST 81 (*)     ALT 66 (*)     All other components within normal limits   CBC W/ AUTO DIFFERENTIAL -  Abnormal; Notable for the following components:    RBC 3.41 (*)     Hemoglobin 11.1 (*)     Hematocrit 34.4 (*)      (*)     MCH 32.6 (*)     Platelets 91 (*)     Lymph # 0.8 (*)     Gran % 78.4 (*)     Lymph % 10.0 (*)     All other components within normal limits   ISTAT PROCEDURE - Abnormal; Notable for the following components:    POC Glucose 229 (*)     All other components within normal limits   LIPASE   URINALYSIS, REFLEX TO URINE CULTURE    Narrative:     Specimen Source->Urine   LACTIC ACID, PLASMA   ISTAT CHEM8          Imaging Results          CT Abdomen Pelvis With Contrast (Final result)  Result time 01/25/22 23:26:11    Final result by Moris Guajardo MD (01/25/22 23:26:11)                 Impression:      1. Multiple loops of small bowel with mild wall thickening could represent inflammatory or infectious process.  Follow-up recommended.  2. Hepatic steatosis.  3. Multilevel chronic and degenerative changes of the lumbar spine.  4. Hepatic steatosis.  5. Stable mildly increased density in the mesentery adjacent to the proximal celiac and SMA, similar to the prior study.  6.    Electronically signed by: Moris Guajardo  Date:    01/25/2022  Time:    23:26             Narrative:    EXAMINATION:  CT ABDOMEN PELVIS WITH CONTRAST    CLINICAL HISTORY:  Bowel obstruction suspected;Abdo pain, hx of bowel obstructions;    TECHNIQUE:  Low dose axial images, sagittal and coronal reformations were obtained from the lung bases to the pubic symphysis following the IV administration of 75 mL of Omnipaque 350 .  Oral contrast was not administered.    COMPARISON:  01/12/2022    FINDINGS:  Abdomen:    - Lower thorax:    - Lung bases: No infiltrates and no nodules.    - Liver: No focal mass.  Mild diffuse fatty infiltration.    - Gallbladder: Status post cholecystectomy.    - Bile Ducts: No evidence of intra or extra hepatic biliary ductal dilation.    - Spleen: Negative.    - Kidneys: No stone, soft tissue mass,  hydronephrosis or hydroureter bilaterally.    Bilateral extrarenal pelvis configuration, right greater than left.  Small right renal cyst.    - Adrenals: Unremarkable.    - Pancreas: No mass or peripancreatic fat stranding.    - Retroperitoneum:  No significant adenopathy.    - Vascular: No abdominal aortic aneurysm.    - Abdominal wall:  Unremarkable.    Pelvis:    No pelvic mass, adenopathy, or free fluid.    Bowel/Mesentery:    Postop changes of multiple loops of bowel.  There is mild diffuse wall thickening of the small bowel could be associated with enteritis.    Retained feces in the colon.    No evidence of bowel obstruction.    Stable mildly increased density in the mesentery adjacent to the proximal celiac and SMA, similar to the prior study.    Bones:  No acute osseous abnormality and no suspicious lytic or blastic lesion.    Bilateral spondylolysis of L5.  Osseous fusion of the L5 and S1 vertebral bodies.  Grade 1 spondylolisthesis of L5 on S1.  Severe bilateral foraminal narrowing at L5-S1.    Grade 1 spondylolisthesis of L4 on L5.  Vacuum disc phenomena is noted with moderate disc space narrowing.    Severe disc space narrowing and vacuum disc phenomena at L3-4.    Bilateral facet arthropathy at L4-5 and L5-S1.    Posterior disc osteophyte complex multiple levels most prominent at L4-5 and L5-S1.  Moderate central canal narrowing at L4-5 and L5-S1.                                 Medications   morphine injection 2 mg (2 mg Intravenous Given 1/25/22 2235)   sodium chloride 0.9% bolus 1,000 mL (0 mLs Intravenous Stopped 1/26/22 0029)   iohexoL (OMNIPAQUE 350) injection 75 mL (75 mLs Intravenous Given 1/25/22 2303)   dicyclomine capsule 20 mg (20 mg Oral Given 1/26/22 0033)   morphine injection 4 mg (4 mg Intravenous Given 1/26/22 0236)     Medical Decision Making:   Initial Assessment:   Patient is a 77-year-old female with a past medical history of multiple SBOs, IBS here with 1 day of worsening  abdominal pain, slimy bowel movements, and headache.  Patient nontoxic appearing, hypertensive to 168/67 with normal heart rate, satting well on room air with normal respiratory rate, afebrile.  Physical exam notable for bilateral lower abdominal tenderness, abdomen soft and nondistended.  Differential Diagnosis:   Differential includes but not limited to IBS exacerbation, SBO, mesenteric ischemia, bacterial or viral enteritis.  Per chart review, patient does not have an appendix or gallbladder.  Clinical Tests:   Lab Tests: Ordered and Reviewed  Radiological Study: Ordered and Reviewed  ED Management:  Basic labs, lipase, lactate, UA ordered.  Will also obtain CT abdomen and pelvis with contrast to further assess.  Morphine and IV fluids ordered.  Distal pending workup.            Attending Attestation:   Physician Attestation Statement for Resident:  As the supervising MD   Physician Attestation Statement: I have personally seen and examined this patient.   I agree with the above history. -:   As the supervising MD I agree with the above PE.    As the supervising MD I agree with the above treatment, course, plan, and disposition.                ED Course as of 01/26/22 0329   Wed Jan 26, 2022   0000 CBC auto differential(!)  Mild macrocytic anemia. Thrombocytopenia consistent with baseline. [JT]   0000 Lactate, Frank: 1.7 [JT]   0001 CT Abdomen Pelvis With Contrast  1. Multiple loops of small bowel with mild wall thickening could represent inflammatory or infectious process.  Follow-up recommended.  2. Hepatic steatosis.  3. Multilevel chronic and degenerative changes of the lumbar spine.  4. Hepatic steatosis.  5. Stable mildly increased density in the mesentery adjacent to the proximal celiac and SMA, similar to the prior study. [JT]   0001 Reassessed pt, stated pain slightly improved after morphine. Bentyl ordered. [JT]   0215 Urinalysis, Reflex to Urine Culture Urine, Clean Catch  Unremarkable [JT]   0327  Comprehensive metabolic panel(!)  Elevated AST and ALT. Pt without RUQ tenderness. [JT]   0328 Pt reports feeling better after morphine, tolerating PO. Pt hemodynamically stable, appropriate for discharge with Mobic and bentyl for abdominal pain, PCP and hepatology follow up for newly elevated transaminases. Strict return precautions issued. Pt and daughter verbalized understanding and agree with plan. [JT]      ED Course User Index  [JT] Shelby Galvan MD             Clinical Impression:   Final diagnoses:  [R10.9] Abdominal pain, unspecified abdominal location (Primary)  [R74.01] Transaminitis          ED Disposition Condition    Discharge Stable        ED Prescriptions     Medication Sig Dispense Start Date End Date Auth. Provider    dicyclomine (BENTYL) 20 mg tablet Take 1 tablet (20 mg total) by mouth 2 (two) times daily. for 15 days 30 tablet 1/26/2022 2/10/2022 Shelby Galvan MD    meloxicam (MOBIC) 7.5 MG tablet Take 1 tablet (7.5 mg total) by mouth once daily. for 5 days 5 tablet 1/26/2022 1/31/2022 Shelby Galvan MD        Follow-up Information     Follow up With Specialties Details Why Contact Info Additional Information    Rocio Mc MD Internal Medicine Schedule an appointment as soon as possible for a visit   [address] [facility] [address]  [place] [address]  [phone]       Hepatology Clinic - [facility] Hepatology Schedule an appointment as soon as possible for a visit   [address] [facility] [address] [phone] [facility], 1st floor near Clinic elevator Please park in [facility] and walk through Clinic elevator hallway           Shelby Galvan MD  Resident  01/26/22 0329       Shelby Galvan MD  Resident  01/26/22 8826       Danay Adam MD  01/28/22 8556

## 2022-01-26 NOTE — ED NOTES
I-STAT Chem-8+ Results:   Value Reference Range   Sodium 136 136-145 mmol/L   Potassium  4.4 3.5-5.1 mmol/L   Chloride 99  mmol/L   Ionized Calcium 1.18 1.06-1.42 mmol/L   CO2 (measured) 29 23-29 mmol/L   Glucose 229  mg/dL   BUN 16 6-30 mg/dL   Creatinine 0.8 0.5-1.4 mg/dL   Hematocrit 36 36-54%

## 2022-01-26 NOTE — TELEPHONE ENCOUNTER
Dr. Jefferson, please advise. Return call and spoke with Keya. Per Keya patient was seen last night at the Er for inflammation and discomfort. Patient was prescribe mobic and dicyclomine.     Per Keya she was inform by the NP at the Er to follow back up with Gastro doctor.Also stating that discomfort is different from the blockage that she was recently seen for.  Patient has a appointment on 03/17/2022 but would like to move the appointment sooner.

## 2022-01-27 NOTE — PROGRESS NOTES
Anayeli it looks like your followed by your urologist for your right hydronephrosis which is once again seen on your CT scan.  There is some distention of the right renal pelvis

## 2022-01-27 NOTE — PROGRESS NOTES
Dr. Linus Dye is scheduled to see your very soon (2/8/2022) but she has had multiple admissions to ERs for recurrent abdominal pain and this is been quite crippling for her over the last several years worsening over the last year to 6 months with several hospital admissions concern for internal hernia also significant amount of scar tissue as well but nothing from an outpatient GI standpoint has improved her quality of life and I think she is at the point were she may need a really look surgically.  It certainly will be in easy surgery.  Impression:     Postop changes in the bowel with several distended small bowel segments at the anastomotic sites.  No convincing obstruction or mass lesion allowing for some decompressed loops of bowel.     There are loops of small bowel peripheral to the colon which may be postoperative though an internal hernia would be difficult to exclude.     8 mm hyperenhancing nodule hepatic dome likely a flash filling hemangioma.     Increased soft tissue about the celiac axis, SMA and aortic bifurcation with scattered para-aortic nodes.  This soft tissue about the aortic bifurcation is slightly increased compared to the study of January 2, 2022.  Etiology and significance uncertain.

## 2022-01-28 ENCOUNTER — TELEPHONE (OUTPATIENT)
Dept: GASTROENTEROLOGY | Facility: CLINIC | Age: 77
End: 2022-01-28
Payer: MEDICARE

## 2022-01-28 NOTE — TELEPHONE ENCOUNTER
On 01/27/2022 contact khushboo the caregiver of Ms. Judd. Left a message for someone to give a call back to the office.

## 2022-01-28 NOTE — TELEPHONE ENCOUNTER
----- Message from Shelby Fonseca sent at 1/28/2022  1:12 PM CST -----  Type:  Patient Returning Call    Who Called: Keya     Who Left Message for Patient: Rodolfo     Does the patient know what this is regarding?: wasn't sure if the appt was needed for an Er f/u    Best Call Back Number:[phone]

## 2022-01-28 NOTE — TELEPHONE ENCOUNTER
Return call and spoke with Keya. Per Keya patient was prescribe mobic 7.5 mg and want to know if she can take 15 mg of mobic instead. Due to her IBO.     Per Dr. Jefferson stated that patient should stay on the mobic 7.5mg.     Return call and spoke with Keya inform Per Dr. Jefferson stated that patient should stay on the mobic 7.5mg and contact her PCP.

## 2022-02-02 ENCOUNTER — OFFICE VISIT (OUTPATIENT)
Dept: INTERNAL MEDICINE | Facility: CLINIC | Age: 77
End: 2022-02-02
Payer: MEDICARE

## 2022-02-02 VITALS
TEMPERATURE: 98 F | BODY MASS INDEX: 25.21 KG/M2 | HEIGHT: 62 IN | WEIGHT: 137 LBS | DIASTOLIC BLOOD PRESSURE: 64 MMHG | HEART RATE: 66 BPM | SYSTOLIC BLOOD PRESSURE: 101 MMHG

## 2022-02-02 DIAGNOSIS — R32 URINARY INCONTINENCE, UNSPECIFIED TYPE: ICD-10-CM

## 2022-02-02 DIAGNOSIS — E11.9 TYPE 2 DIABETES MELLITUS WITHOUT COMPLICATION, WITHOUT LONG-TERM CURRENT USE OF INSULIN: ICD-10-CM

## 2022-02-02 DIAGNOSIS — K59.00 CONSTIPATION, UNSPECIFIED CONSTIPATION TYPE: Primary | ICD-10-CM

## 2022-02-02 PROCEDURE — 99203 OFFICE O/P NEW LOW 30 MIN: CPT | Mod: S$PBB,,, | Performed by: INTERNAL MEDICINE

## 2022-02-02 PROCEDURE — 99999 PR PBB SHADOW E&M-EST. PATIENT-LVL V: ICD-10-PCS | Mod: PBBFAC,,, | Performed by: INTERNAL MEDICINE

## 2022-02-02 PROCEDURE — 99203 PR OFFICE/OUTPT VISIT, NEW, LEVL III, 30-44 MIN: ICD-10-PCS | Mod: S$PBB,,, | Performed by: INTERNAL MEDICINE

## 2022-02-02 PROCEDURE — 99999 PR PBB SHADOW E&M-EST. PATIENT-LVL V: CPT | Mod: PBBFAC,,, | Performed by: INTERNAL MEDICINE

## 2022-02-02 PROCEDURE — 99215 OFFICE O/P EST HI 40 MIN: CPT | Mod: PBBFAC | Performed by: INTERNAL MEDICINE

## 2022-02-02 RX ORDER — GINSENG 100 MG
1 CAPSULE ORAL DAILY
COMMUNITY
Start: 2021-09-13

## 2022-02-03 ENCOUNTER — EXTERNAL HOME HEALTH (OUTPATIENT)
Dept: HOME HEALTH SERVICES | Facility: HOSPITAL | Age: 77
End: 2022-02-03
Payer: MEDICARE

## 2022-02-03 ENCOUNTER — OFFICE VISIT (OUTPATIENT)
Dept: UROLOGY | Facility: CLINIC | Age: 77
End: 2022-02-03
Payer: MEDICARE

## 2022-02-03 VITALS
WEIGHT: 130.5 LBS | SYSTOLIC BLOOD PRESSURE: 117 MMHG | HEART RATE: 63 BPM | DIASTOLIC BLOOD PRESSURE: 60 MMHG | BODY MASS INDEX: 24.01 KG/M2 | HEIGHT: 62 IN

## 2022-02-03 DIAGNOSIS — N39.41 URGE INCONTINENCE: Primary | ICD-10-CM

## 2022-02-03 PROCEDURE — 99999 PR PBB SHADOW E&M-EST. PATIENT-LVL IV: ICD-10-PCS | Mod: PBBFAC,,, | Performed by: UROLOGY

## 2022-02-03 PROCEDURE — 99999 PR PBB SHADOW E&M-EST. PATIENT-LVL IV: CPT | Mod: PBBFAC,,, | Performed by: UROLOGY

## 2022-02-03 PROCEDURE — 99215 OFFICE O/P EST HI 40 MIN: CPT | Mod: S$PBB,,, | Performed by: UROLOGY

## 2022-02-03 PROCEDURE — 99214 OFFICE O/P EST MOD 30 MIN: CPT | Mod: PBBFAC | Performed by: UROLOGY

## 2022-02-03 PROCEDURE — 99215 PR OFFICE/OUTPT VISIT, EST, LEVL V, 40-54 MIN: ICD-10-PCS | Mod: S$PBB,,, | Performed by: UROLOGY

## 2022-02-03 RX ORDER — HYOSCYAMINE SULFATE 0.125 MG
125 TABLET ORAL EVERY 8 HOURS PRN
Qty: 90 TABLET | Refills: 3 | Status: SHIPPED | OUTPATIENT
Start: 2022-02-03 | End: 2022-05-02

## 2022-02-03 NOTE — PROGRESS NOTES
CHIEF COMPLAINT:    Mrs. Judd is a 77 y.o. female presenting for a follow up on urgency, urge incontinence, incomplete bladder emptying and right hydronephrosis    PRESENTING ILLNESS:    Anayeli Judd is a 77 y.o. female who returns for follow up.  She had two CT Scans one on 2022 and another on 2022.  The second showed that her bladder was not full and there was no hydronephrosis.  The other showed that the bladder was full and there was mild right hydronephrosis.      She has urgency and urge incontinence.  Wears a pad and changes it twice a day.      She is accompanied by Keya who is her assistant, also the person who takes notes to pass on the information to her daughters.  Has no symptoms of a bladder infection.  The Myrbetriq is doing nothing for her.  Was discontinued in the chart.  She has a problem with abdominal pain and bloating and went to the ER which was why the second CT scan was performed.                REVIEW OF SYSTEMS:    Review of Systems   Constitutional: Negative.    HENT: Negative.    Eyes: Negative.    Respiratory: Negative.    Cardiovascular: Negative.    Genitourinary: Negative.    Musculoskeletal: Negative.    Skin: Negative.    Neurological: Negative.    Endo/Heme/Allergies: Negative.    Psychiatric/Behavioral: Negative.        PATIENT HISTORY:    Past Medical History:   Diagnosis Date    Abdominal pain     Allergic rhinitis     Arthritis     Blood platelet disorder     Blood platelet disorder     Blood transfusion     during delivery and     Bowel obstruction     Cervical radiculopathy     followed by dr cloud    Colon cancer     transverse colon; resected; Stage IIA (pT3 pN0 MX)    Diabetes mellitus     Diarrhea     Falls 2020    Family history of breast cancer     Family history of colon cancer     Fatty liver     GERD (gastroesophageal reflux disease)     History of shingles     Hyperlipidemia     Hypothyroidism     Irritable  bowel syndrome     Microscopic colitis     treated 2013    Raynaud phenomenon     Raynaud's disease     Type 2 diabetes mellitus        Past Surgical History:   Procedure Laterality Date    APPENDECTOMY      BACK SURGERY      CARPAL TUNNEL RELEASE      bilateral      SECTION      CHOLECYSTECTOMY  1965    COLECTOMY  2011    Transverse colon resection by Dr. Aguirre    COLONOSCOPY N/A 2017    Procedure: COLONOSCOPY;  Surgeon: Manjit Alvarez MD;  Location: [facility] (4TH FLR);  Service: Endoscopy;  Laterality: N/A;    COLONOSCOPY N/A 2019    Procedure: COLONOSCOPY;  Surgeon: Ramiro Jefferson MD;  Location: [facility] (4TH FLR);  Service: Endoscopy;  Laterality: N/A;  PM prep    CYSTOSCOPY N/A 2021    Procedure: CYSTOSCOPY;  Surgeon: Viridiana Valenzuela MD;  Location: [facility] OR 1ST FLR;  Service: Urology;  Laterality: N/A;    ENDOSCOPIC ULTRASOUND OF UPPER GASTROINTESTINAL TRACT N/A 2018    Procedure: ULTRASOUND, UPPER GI TRACT, ENDOSCOPIC;  Surgeon: Jose Hess MD;  Location: [facility];  Service: Endoscopy;  Laterality: N/A;    ENDOSCOPIC ULTRASOUND OF UPPER GASTROINTESTINAL TRACT N/A 2019    Procedure: ULTRASOUND, UPPER GI TRACT, ENDOSCOPIC;  Surgeon: Jose Hess MD;  Location: [facility] (2ND FLR);  Service: Endoscopy;  Laterality: N/A;    ESOPHAGOGASTRODUODENOSCOPY N/A 2018    Procedure: EGD (ESOPHAGOGASTRODUODENOSCOPY);  Surgeon: Angelo Reynolds MD;  Location: [facility] (2ND FLR);  Service: Endoscopy;  Laterality: N/A;    ESOPHAGOGASTRODUODENOSCOPY N/A 2019    Procedure: EGD (ESOPHAGOGASTRODUODENOSCOPY);  Surgeon: Ramiro Jefferson MD;  Location: [facility] (4TH FLR);  Service: Endoscopy;  Laterality: N/A;    EYE SURGERY      Cataract Removal    FLUOROSCOPIC URODYNAMIC STUDY N/A 2021    Procedure: URODYNAMIC STUDY, FLUOROSCOPIC;  Surgeon: Viridiana Valenzuela MD;  Location: [facility] OR 1ST FLR;  Service: Urology;  Laterality: N/A;  90  minutes     HYSTERECTOMY      posterolateral fusion with autograft bone and [place] mineralized bone matrix  13    at [facility] for lumbar spine stenosis    TOE SURGERY      TONSILLECTOMY      TRIGGER FINGER RELEASE         Family History   Problem Relation Age of Onset    Heart disease Father 50        Mi age 50    Colon cancer Father     Bladder Cancer Mother         non smoker    Cataracts Mother     Glaucoma Mother     Heart disease Mother     Hyperlipidemia Mother     Kidney disease Mother     Breast cancer Sister 79    Arthritis Daughter     Asthma Daughter     Depression Daughter     Hypertension Daughter     Stroke Daughter 40    Arthritis Brother     Colon cancer Brother 70    Alcohol abuse Brother     Parkinsonism Brother     Alcohol abuse Brother     Depression Daughter      Social History     Socioeconomic History    Marital status:    Tobacco Use    Smoking status: Never Smoker    Smokeless tobacco: Never Used   Substance and Sexual Activity    Alcohol use: Yes     Comment: socially, hardly ever    Drug use: No    Sexual activity: Not Currently   Social History Narrative    , lives alone.  Primary support are her children and friends.       Allergies:  Dilaudid [hydromorphone (bulk)], Codeine, Dilaudid [hydromorphone], Percocet  [oxycodone-acetaminophen], and Sulfa (sulfonamide antibiotics)    Medications:  Outpatient Encounter Medications as of 2/3/2022   Medication Sig Dispense Refill    ascorbic Acid (VITAMIN C) 500 mg CpSR Take 500 mg by mouth once daily.       atorvastatin (LIPITOR) 40 MG tablet TAKE 1 TABLET BY MOUTH  DAILY 90 tablet 3    biotin 5,000 mcg TbDL Take 1 tablet by mouth once daily.       butalbital-acetaminophen-caffeine -40 mg (FIORICET, ESGIC) -40 mg per tablet SMARTSI Tablet(s) By Mouth 1 to 3 Times Daily PRN      cholestyramine-aspartame (CHOLESTYRAMINE LIGHT) 4 gram PwPk Take 1 packet (4 g total) by mouth daily as  needed (Diarrhea). 90 packet 0    clotrimazole-betamethasone 1-0.05% (LOTRISONE) cream Apply topically 2 (two) times daily.      co-enzyme Q-10 30 mg capsule Take 30 mg by mouth 3 (three) times daily.      conjugated estrogens (PREMARIN) vaginal cream INSERT 1/2 GRAM VAGINALLY  TWICE WEEKLY 30 g 3    cranberry extract 200 mg Cap 1 capsule DAILY (route: oral)      D-MANNOSE ORAL Take 1 tablet by mouth once daily.       dicyclomine (BENTYL) 20 mg tablet Take 1 tablet (20 mg total) by mouth 2 (two) times daily. for 15 days 30 tablet 0    DULoxetine (CYMBALTA) 30 MG capsule Take 1 capsule (30 mg total) by mouth 2 (two) times daily. 180 capsule 3    flash glucose sensor (FREESTYLE EFREN 14 DAY SENSOR) Kit 1 application by Misc.(Non-Drug; Combo Route) route every 14 (fourteen) days. Disp 30 or 90 day refill 6 kit 6    FREESTYLE EFREN 10 DAY READER Mis TEST as directed 3 each 3    FREESTYLE LITE STRIPS Strp TEST DAILY AS DIRECTED 50 strip 2    hydrocortisone 2.5 % cream Apply topically 2 (two) times daily as needed. 1 each 1    Lactobacillus rhamnosus GG (CULTURELLE) 10 billion cell capsule Take 1 capsule by mouth once daily. 30 capsule 0    levothyroxine (SYNTHROID) 75 MCG tablet Take 1 tablet (75 mcg total) by mouth before breakfast. 90 tablet 3    magnesium 30 mg Tab Take 500 capsules by mouth.       montelukast (SINGULAIR) 10 mg tablet TAKE 1 TABLET BY MOUTH  DAILY 90 tablet 3    omega 3-dha-epa-fish oil 1,200 (144-216) mg Cap Take 1 capsule by mouth once daily.       ondansetron (ZOFRAN-ODT) 8 MG TbDL Take 1 tablet (8 mg total) by mouth every 8 (eight) hours as needed (nausea). 30 tablet 0    pantoprazole (PROTONIX) 20 MG tablet Take 1 tablet (20 mg total) by mouth once daily. 30 tablet 2    polyethylene glycol (GLYCOLAX) 17 gram/dose powder Take 17 g by mouth before dinner. 1530 g 1    promethazine-codeine 6.25-10 mg/5 ml (PHENERGAN WITH CODEINE) 6.25-10 mg/5 mL syrup Take 5 mLs by mouth every 4  to 6 hours as needed for Cough. 240 mL 0    psyllium husk, aspartame, (METAMUCIL) 3.4 gram PwPk packet Take 1 packet by mouth 2 (two) times daily. 60 packet 0    senna-docusate 8.6-50 mg (PERICOLACE) 8.6-50 mg per tablet Take 1 tablet by mouth 2 (two) times daily. 60 tablet 2    simethicone (MYLICON) 80 MG chewable tablet Take 1 tablet (80 mg total) by mouth every 6 (six) hours as needed for Flatulence. 30 tablet 0    vitamin D 1000 units Tab Take 185 mg by mouth once daily. D3      [DISCONTINUED] mirabegron (MYRBETRIQ) 50 mg Tb24 Take 1 tablet (50 mg total) by mouth once daily. 60 tablet 3    azelastine (ASTELIN) 137 mcg (0.1 %) nasal spray 2 sprays (274 mcg total) by Nasal route 2 (two) times daily. 30 mL 1    hyoscyamine (ANASPAZ,LEVSIN) 0.125 mg Tab Take 1 tablet (125 mcg total) by mouth every 8 (eight) hours as needed (urgency). 90 tablet 3     No facility-administered encounter medications on file as of 2/3/2022.         PHYSICAL EXAMINATION:    The patient generally appears in good health, is appropriately interactive, and is in no apparent distress.    Skin: No lesions.    Mental: Cooperative with normal affect.    Neuro: Grossly intact.    HEENT: Normal. No evidence of lymphadenopathy.    Abdomen:  Soft, non-tender. No masses or organomegaly. Bladder is not palpable. No evidence of flank discomfort. No evidence of inguinal hernia.    PVR by bladder scan was 144 ml    LABS:    Lab Results   Component Value Date    BUN 13 01/25/2022    CREATININE 0.8 01/25/2022     UA 1.005, pH 7, + leuk, otherwise, negative    IMPRESSION:    Encounter Diagnoses   Name Primary?    Urge incontinence Yes       PLAN:    1. Hyoscyamine 0.125 mg tid prn  2.  Discontinued the Myrbetriq as it is ineffective  3.  Discussed SNM.  Brochure provided  4.  Follow up in 2 months

## 2022-02-04 RX ORDER — VALACYCLOVIR HYDROCHLORIDE 500 MG/1
500 TABLET, FILM COATED ORAL 3 TIMES DAILY
Qty: 30 TABLET | Refills: 0 | Status: ON HOLD | OUTPATIENT
Start: 2022-02-04 | End: 2022-02-12 | Stop reason: HOSPADM

## 2022-02-04 RX ORDER — LIDOCAINE 50 MG/G
1 PATCH TOPICAL DAILY
Qty: 30 PATCH | Refills: 0 | Status: SHIPPED | OUTPATIENT
Start: 2022-02-04 | End: 2022-12-08

## 2022-02-04 NOTE — PROGRESS NOTES
Subjective:       Patient ID: Anayeli Judd is a 77 y.o. female.    Chief Complaint: Annual Exam and [facility] (Instability when walking)      HPI  Establishing care ( clinic). [place] noting that she will have 2 [facility] physicians, myself at [facility] and Dr. Mc near [facility]. I have already contacted Dr. Mc to inform so that we coordinate appropriately when needed.  Pt here with family. Hx of ER visits and hospitalizations for abdominal pains, partial SBO over past years. Recent ED visit last week. Had resection of colon cancer 2011. Unremarkable colonoscopies since. Multiple f/u CT scans, including last week. Currently taking Metamucil daily and Miralax daily. Drinks plenty of water, which is something they focus on. Over 60 oz daily at minimum. Issues with bowels have typically fluctuated between diarrhea and constipation over the years. Pains do seem to improve with BM. Notes oral contrast given for scans at ER seem to work effectively as laxative. Hx of cholecystectomy. Has tried cholestyramine but family notes constipation a significant issue with this. Following with GI, seeing Surgery soon for evaluation.  Stopped Metformin recently. Was on 500 mg TID for diabetes. Has an arm monitor and notes levels have been above 200 since last ER visit. Family notes finger sticks do not seem to be consistent with the patch reads.    Review of Systems   Constitutional: Positive for fatigue. Negative for appetite change and fever.   Respiratory: Negative for cough and shortness of breath.    Cardiovascular: Negative for chest pain and leg swelling.   Gastrointestinal: Positive for abdominal pain, constipation and diarrhea. Negative for blood in stool, nausea and vomiting.   Genitourinary: Positive for urgency. Negative for dysuria and hematuria.       Past Medical History:   Diagnosis Date    Abdominal pain     Allergic rhinitis     Arthritis     Blood platelet  disorder     Blood platelet disorder     Blood transfusion     during delivery and     Bowel obstruction     Cervical radiculopathy     followed by dr cloud    Colon cancer     transverse colon; resected; Stage IIA (pT3 pN0 MX)    Diabetes mellitus     Diarrhea     Falls 2020    Family history of breast cancer     Family history of colon cancer     Fatty liver     GERD (gastroesophageal reflux disease)     History of shingles     Hyperlipidemia     Hypothyroidism     Irritable bowel syndrome     Microscopic colitis     treated     Raynaud phenomenon     Raynaud's disease     Type 2 diabetes mellitus          Current Outpatient Medications:     ascorbic Acid (VITAMIN C) 500 mg CpSR, Take 500 mg by mouth once daily. , Disp: , Rfl:     atorvastatin (LIPITOR) 40 MG tablet, TAKE 1 TABLET BY MOUTH  DAILY, Disp: 90 tablet, Rfl: 3    cholestyramine-aspartame (CHOLESTYRAMINE LIGHT) 4 gram PwPk, Take 1 packet (4 g total) by mouth daily as needed (Diarrhea)., Disp: 90 packet, Rfl: 0    conjugated estrogens (PREMARIN) vaginal cream, INSERT 1/2 GRAM VAGINALLY  TWICE WEEKLY, Disp: 30 g, Rfl: 3    cranberry extract 200 mg Cap, 1 capsule DAILY (route: oral), Disp: , Rfl:     dicyclomine (BENTYL) 20 mg tablet, Take 1 tablet (20 mg total) by mouth 2 (two) times daily. for 15 days, Disp: 30 tablet, Rfl: 0    DULoxetine (CYMBALTA) 30 MG capsule, Take 1 capsule (30 mg total) by mouth 2 (two) times daily., Disp: 180 capsule, Rfl: 3    flash glucose sensor (FREESTYLE EFREN 14 DAY SENSOR) Kit, 1 application by Misc.(Non-Drug; Combo Route) route every 14 (fourteen) days. Disp 30 or 90 day refill, Disp: 6 kit, Rfl: 6    FREESTYLE EFREN 10 DAY READER [facility], TEST as directed, Disp: 3 each, Rfl: 3    FREESTYLE LITE STRIPS Strp, TEST DAILY AS DIRECTED, Disp: 50 strip, Rfl: 2    hydrocortisone 2.5 % cream, Apply topically 2 (two) times daily as needed., Disp: 1 each, Rfl: 1    Lactobacillus  rhamnosus GG (CULTURELLE) 10 billion cell capsule, Take 1 capsule by mouth once daily., Disp: 30 capsule, Rfl: 0    levothyroxine (SYNTHROID) 75 MCG tablet, Take 1 tablet (75 mcg total) by mouth before breakfast., Disp: 90 tablet, Rfl: 3    magnesium 30 mg Tab, Take 500 capsules by mouth. , Disp: , Rfl:     montelukast (SINGULAIR) 10 mg tablet, TAKE 1 TABLET BY MOUTH  DAILY, Disp: 90 tablet, Rfl: 3    omega 3-dha-epa-fish oil 1,200 (144-216) mg Cap, Take 1 capsule by mouth once daily. , Disp: , Rfl:     pantoprazole (PROTONIX) 20 MG tablet, Take 1 tablet (20 mg total) by mouth once daily., Disp: 30 tablet, Rfl: 2    simethicone (MYLICON) 80 MG chewable tablet, Take 1 tablet (80 mg total) by mouth every 6 (six) hours as needed for Flatulence., Disp: 30 tablet, Rfl: 0    vitamin D 1000 units Tab, Take 185 mg by mouth once daily. D3, Disp: , Rfl:     azelastine (ASTELIN) 137 mcg (0.1 %) nasal spray, 2 sprays (274 mcg total) by Nasal route 2 (two) times daily., Disp: 30 mL, Rfl: 1    biotin 5,000 mcg TbDL, Take 1 tablet by mouth once daily. , Disp: , Rfl:     butalbital-acetaminophen-caffeine -40 mg (FIORICET, ESGIC) -40 mg per tablet, SMARTSI Tablet(s) By Mouth 1 to 3 Times Daily PRN, Disp: , Rfl:     clotrimazole-betamethasone 1-0.05% (LOTRISONE) cream, Apply topically 2 (two) times daily., Disp: , Rfl:     co-enzyme Q-10 30 mg capsule, Take 30 mg by mouth 3 (three) times daily., Disp: , Rfl:     D-MANNOSE ORAL, Take 1 tablet by mouth once daily. , Disp: , Rfl:     hyoscyamine (ANASPAZ,LEVSIN) 0.125 mg Tab, Take 1 tablet (125 mcg total) by mouth every 8 (eight) hours as needed (urgency)., Disp: 90 tablet, Rfl: 3    ondansetron (ZOFRAN-ODT) 8 MG TbDL, Take 1 tablet (8 mg total) by mouth every 8 (eight) hours as needed (nausea)., Disp: 30 tablet, Rfl: 0    polyethylene glycol (GLYCOLAX) 17 gram/dose powder, Take 17 g by mouth before dinner., Disp: 1530 g, Rfl: 1    promethazine-codeine  6.25-10 mg/5 ml (PHENERGAN WITH CODEINE) 6.25-10 mg/5 mL syrup, Take 5 mLs by mouth every 4 to 6 hours as needed for Cough., Disp: 240 mL, Rfl: 0    psyllium husk, aspartame, (METAMUCIL) 3.4 gram PwPk packet, Take 1 packet by mouth 2 (two) times daily., Disp: 60 packet, Rfl: 0    senna-docusate 8.6-50 mg (PERICOLACE) 8.6-50 mg per tablet, Take 1 tablet by mouth 2 (two) times daily., Disp: 60 tablet, Rfl: 2    Past Surgical History:   Procedure Laterality Date    APPENDECTOMY      BACK SURGERY      CARPAL TUNNEL RELEASE      bilateral      SECTION      CHOLECYSTECTOMY  1965    COLECTOMY  2011    Transverse colon resection by Dr. Aguirre    COLONOSCOPY N/A 2017    Procedure: COLONOSCOPY;  Surgeon: Manjit Alvarez MD;  [facility]);  Service: Endoscopy;  Laterality: N/A;    COLONOSCOPY N/A 2019    Procedure: COLONOSCOPY;  Surgeon: Ramiro Jefferson MD;  [facility]);  Service: Endoscopy;  Laterality: N/A;  PM prep    CYSTOSCOPY N/A 2021    Procedure: CYSTOSCOPY;  Surgeon: Viridiana Valenzuela MD;  [address]  Service: Urology;  Laterality: N/A;    ENDOSCOPIC ULTRASOUND OF UPPER GASTROINTESTINAL TRACT N/A 2018    Procedure: ULTRASOUND, UPPER GI TRACT, ENDOSCOPIC;  Surgeon: Jose Hess MD;  Location: [facility];  Service: Endoscopy;  Laterality: N/A;    ENDOSCOPIC ULTRASOUND OF UPPER GASTROINTESTINAL TRACT N/A 2019    Procedure: ULTRASOUND, UPPER GI TRACT, ENDOSCOPIC;  Surgeon: Jose Hess MD;  Location: [facility] (2ND FLR);  Service: Endoscopy;  Laterality: N/A;    ESOPHAGOGASTRODUODENOSCOPY N/A 2018    Procedure: EGD (ESOPHAGOGASTRODUODENOSCOPY);  Surgeon: Angelo Reynolds MD;  Location: [facility] (2ND FLR);  Service: Endoscopy;  Laterality: N/A;    ESOPHAGOGASTRODUODENOSCOPY N/A 2019    Procedure: EGD (ESOPHAGOGASTRODUODENOSCOPY);  Surgeon: Ramiro Jefferson MD;  Location: [facility] ([address] [place] [address]);   Service: Endoscopy;  Laterality: N/A;    EYE SURGERY      Cataract Removal    FLUOROSCOPIC URODYNAMIC STUDY N/A 11/9/2021    Procedure: URODYNAMIC STUDY, FLUOROSCOPIC;  Surgeon: Viridiana Valenzuela MD;  Location: [facility] OR [address] [place] [address];  Service: Urology;  Laterality: N/A;  90 minutes     HYSTERECTOMY      posterolateral fusion with autograft bone and [place] mineralized bone matrix  2/1/13    at [facility] for lumbar spine stenosis    TOE SURGERY      TONSILLECTOMY      TRIGGER FINGER RELEASE         Family History   Problem Relation Age of Onset    Heart disease Father 50        Mi age 50    Colon cancer Father     Bladder Cancer Mother         non smoker    Cataracts Mother     Glaucoma Mother     Heart disease Mother     Hyperlipidemia Mother     Kidney disease Mother     Breast cancer Sister 79    Arthritis Daughter     Asthma Daughter     Depression Daughter     Hypertension Daughter     Stroke Daughter 40    Arthritis Brother     Colon cancer Brother 70    Alcohol abuse Brother     Parkinsonism Brother     Alcohol abuse Brother     Depression Daughter     Celiac disease Neg Hx     Cirrhosis Neg Hx     Colon polyps Neg Hx     Crohn's disease Neg Hx     Cystic fibrosis Neg Hx     Esophageal cancer Neg Hx     Hemochromatosis Neg Hx     Inflammatory bowel disease Neg Hx     Irritable bowel syndrome Neg Hx     Liver cancer Neg Hx     Liver disease Neg Hx     Rectal cancer Neg Hx     Stomach cancer Neg Hx     Ulcerative colitis Neg Hx     Eliazar's disease Neg Hx     Amblyopia Neg Hx     Blindness Neg Hx     Macular degeneration Neg Hx     Retinal detachment Neg Hx     Strabismus Neg Hx     Melanoma Neg Hx        Social History     Tobacco Use    Smoking status: Never Smoker    Smokeless tobacco: Never Used   Substance Use Topics    Alcohol use: Yes     Comment: socially, hardly ever    Drug use: No       Immunization History   Administered Date(s) Administered    COVID-19,  MRNA, LN-S, PF (Pfizer) (Purple Cap) 01/17/2021, 02/08/2021, 09/13/2021    Hepatitis A / Hepatitis B 12/16/2013, 01/13/2014, 06/16/2014    Hepatitis B (recombinant) Adjuvanted, 2 dose 11/17/2021, 12/21/2021    PPD Test 04/12/2017    Pneumococcal Conjugate - 7 Valent 10/05/2012    Pneumococcal Polysaccharide - 23 Valent 12/03/2010    Tdap 05/04/2015    Zoster 08/04/2010    Zoster Recombinant 11/08/2018, 02/19/2019         Objective:      Vitals:    02/02/22 1349   BP: 101/64   Pulse: 66   Temp: 98.3 °F (36.8 °C)       Physical Exam    Recent Results (from the past 2016 hour(s))   CBC W/ AUTO DIFFERENTIAL    Collection Time: 01/01/22  9:51 PM   Result Value Ref Range    WBC 8.55 3.90 - 12.70 K/uL    RBC 3.54 (L) 4.00 - 5.40 M/uL    Hemoglobin 11.5 (L) 12.0 - 16.0 g/dL    Hematocrit 36.1 (L) 37.0 - 48.5 %     (H) 82 - 98 fL    MCH 32.5 (H) 27.0 - 31.0 pg    MCHC 31.9 (L) 32.0 - 36.0 g/dL    RDW 14.2 11.5 - 14.5 %    Platelets 84 (L) 150 - 450 K/uL    MPV 10.9 9.2 - 12.9 fL    Immature Granulocytes 0.1 0.0 - 0.5 %    Gran # (ANC) 6.4 1.8 - 7.7 K/uL    Immature Grans (Abs) 0.01 0.00 - 0.04 K/uL    Lymph # 1.2 1.0 - 4.8 K/uL    Mono # 0.7 0.3 - 1.0 K/uL    Eos # 0.2 0.0 - 0.5 K/uL    Baso # 0.03 0.00 - 0.20 K/uL    nRBC 0 0 /100 WBC    Gran % 74.4 (H) 38.0 - 73.0 %    Lymph % 14.5 (L) 18.0 - 48.0 %    Mono % 8.7 4.0 - 15.0 %    Eosinophil % 1.9 0.0 - 8.0 %    Basophil % 0.4 0.0 - 1.9 %    Differential Method Automated    Comp. Metabolic Panel    Collection Time: 01/01/22  9:51 PM   Result Value Ref Range    Sodium 138 136 - 145 mmol/L    Potassium 4.7 3.5 - 5.1 mmol/L    Chloride 104 95 - 110 mmol/L    CO2 23 23 - 29 mmol/L    Glucose 179 (H) 70 - 110 mg/dL    BUN 12 8 - 23 mg/dL    Creatinine 0.8 0.5 - 1.4 mg/dL    Calcium 9.3 8.7 - 10.5 mg/dL    Total Protein 7.2 6.0 - 8.4 g/dL    Albumin 3.8 3.5 - 5.2 g/dL    Total Bilirubin 1.4 (H) 0.1 - 1.0 mg/dL    Alkaline Phosphatase 97 55 - 135 U/L    AST 51 (H) 10  - 40 U/L    ALT 40 10 - 44 U/L    Anion Gap 11 8 - 16 mmol/L    eGFR if African American >60.0 >60 mL/min/1.73 m^2    eGFR if non African American >60.0 >60 mL/min/1.73 m^2   Lipase    Collection Time: 01/01/22  9:51 PM   Result Value Ref Range    Lipase 13 4 - 60 U/L   Lactic acid, plasma    Collection Time: 01/01/22  9:51 PM   Result Value Ref Range    Lactate (Lactic Acid) 1.5 0.5 - 2.2 mmol/L   POCT COVID-19 Rapid Screening    Collection Time: 01/01/22 10:30 PM   Result Value Ref Range    POC Rapid COVID Negative Negative     Acceptable Yes    Urinalysis, Reflex to Urine Culture Urine, Clean Catch    Collection Time: 01/01/22 10:37 PM    Specimen: Urine   Result Value Ref Range    Specimen UA Urine, Clean Catch     Color, UA Yellow Yellow, Straw, Vangie    Appearance, UA Clear Clear    pH, UA 6.0 5.0 - 8.0    Specific Gravity, UA 1.015 1.005 - 1.030    Protein, UA Negative Negative    Glucose, UA Negative Negative    Ketones, UA Negative Negative    Bilirubin (UA) Negative Negative    Occult Blood UA Negative Negative    Nitrite, UA Negative Negative    Leukocytes, UA Trace (A) Negative   Urinalysis Microscopic    Collection Time: 01/01/22 10:37 PM   Result Value Ref Range    RBC, UA 1 0 - 4 /hpf    WBC, UA 8 (H) 0 - 5 /hpf    Squam Epithel, UA 1 /hpf    Microscopic Comment SEE COMMENT    Comprehensive metabolic panel    Collection Time: 01/02/22  5:24 AM   Result Value Ref Range    Sodium 138 136 - 145 mmol/L    Potassium 4.1 3.5 - 5.1 mmol/L    Chloride 109 95 - 110 mmol/L    CO2 23 23 - 29 mmol/L    Glucose 154 (H) 70 - 110 mg/dL    BUN 9 8 - 23 mg/dL    Creatinine 0.7 0.5 - 1.4 mg/dL    Calcium 8.3 (L) 8.7 - 10.5 mg/dL    Total Protein 5.5 (L) 6.0 - 8.4 g/dL    Albumin 3.0 (L) 3.5 - 5.2 g/dL    Total Bilirubin 1.0 0.1 - 1.0 mg/dL    Alkaline Phosphatase 82 55 - 135 U/L    AST 50 (H) 10 - 40 U/L    ALT 40 10 - 44 U/L    Anion Gap 6 (L) 8 - 16 mmol/L    eGFR if African American >60.0 >60  mL/min/1.73 m^2    eGFR if non African American >60.0 >60 mL/min/1.73 m^2   Magnesium    Collection Time: 01/02/22  5:24 AM   Result Value Ref Range    Magnesium 1.7 1.6 - 2.6 mg/dL   Phosphorus    Collection Time: 01/02/22  5:24 AM   Result Value Ref Range    Phosphorus 3.1 2.7 - 4.5 mg/dL   CBC auto differential    Collection Time: 01/02/22  5:24 AM   Result Value Ref Range    WBC 4.45 3.90 - 12.70 K/uL    RBC 3.10 (L) 4.00 - 5.40 M/uL    Hemoglobin 10.1 (L) 12.0 - 16.0 g/dL    Hematocrit 31.0 (L) 37.0 - 48.5 %     (H) 82 - 98 fL    MCH 32.6 (H) 27.0 - 31.0 pg    MCHC 32.6 32.0 - 36.0 g/dL    RDW 13.9 11.5 - 14.5 %    Platelets 63 (L) 150 - 450 K/uL    MPV 10.7 9.2 - 12.9 fL    Immature Granulocytes 0.0 0.0 - 0.5 %    Gran # (ANC) 2.5 1.8 - 7.7 K/uL    Immature Grans (Abs) 0.00 0.00 - 0.04 K/uL    Lymph # 1.3 1.0 - 4.8 K/uL    Mono # 0.5 0.3 - 1.0 K/uL    Eos # 0.2 0.0 - 0.5 K/uL    Baso # 0.02 0.00 - 0.20 K/uL    nRBC 0 0 /100 WBC    Gran % 55.8 38.0 - 73.0 %    Lymph % 29.2 18.0 - 48.0 %    Mono % 10.6 4.0 - 15.0 %    Eosinophil % 4.0 0.0 - 8.0 %    Basophil % 0.4 0.0 - 1.9 %    Differential Method Automated    Comprehensive metabolic panel    Collection Time: 01/03/22  3:56 AM   Result Value Ref Range    Sodium 140 136 - 145 mmol/L    Potassium 4.1 3.5 - 5.1 mmol/L    Chloride 107 95 - 110 mmol/L    CO2 26 23 - 29 mmol/L    Glucose 113 (H) 70 - 110 mg/dL    BUN 8 8 - 23 mg/dL    Creatinine 0.7 0.5 - 1.4 mg/dL    Calcium 8.7 8.7 - 10.5 mg/dL    Total Protein 5.1 (L) 6.0 - 8.4 g/dL    Albumin 2.9 (L) 3.5 - 5.2 g/dL    Total Bilirubin 1.4 (H) 0.1 - 1.0 mg/dL    Alkaline Phosphatase 80 55 - 135 U/L    AST 31 10 - 40 U/L    ALT 33 10 - 44 U/L    Anion Gap 7 (L) 8 - 16 mmol/L    eGFR if African American >60.0 >60 mL/min/1.73 m^2    eGFR if non African American >60.0 >60 mL/min/1.73 m^2   Magnesium    Collection Time: 01/03/22  3:56 AM   Result Value Ref Range    Magnesium 1.6 1.6 - 2.6 mg/dL   Phosphorus     Collection Time: 01/03/22  3:56 AM   Result Value Ref Range    Phosphorus 3.9 2.7 - 4.5 mg/dL   CBC auto differential    Collection Time: 01/03/22  3:56 AM   Result Value Ref Range    WBC 3.45 (L) 3.90 - 12.70 K/uL    RBC 3.18 (L) 4.00 - 5.40 M/uL    Hemoglobin 10.5 (L) 12.0 - 16.0 g/dL    Hematocrit 32.5 (L) 37.0 - 48.5 %     (H) 82 - 98 fL    MCH 33.0 (H) 27.0 - 31.0 pg    MCHC 32.3 32.0 - 36.0 g/dL    RDW 14.0 11.5 - 14.5 %    Platelets 62 (L) 150 - 450 K/uL    MPV 10.5 9.2 - 12.9 fL    Immature Granulocytes 0.0 0.0 - 0.5 %    Gran # (ANC) 1.6 (L) 1.8 - 7.7 K/uL    Immature Grans (Abs) 0.00 0.00 - 0.04 K/uL    Lymph # 1.2 1.0 - 4.8 K/uL    Mono # 0.5 0.3 - 1.0 K/uL    Eos # 0.2 0.0 - 0.5 K/uL    Baso # 0.02 0.00 - 0.20 K/uL    nRBC 0 0 /100 WBC    Gran % 47.6 38.0 - 73.0 %    Lymph % 33.6 18.0 - 48.0 %    Mono % 13.0 4.0 - 15.0 %    Eosinophil % 5.2 0.0 - 8.0 %    Basophil % 0.6 0.0 - 1.9 %    Differential Method Automated    Comprehensive metabolic panel    Collection Time: 01/04/22  5:11 AM   Result Value Ref Range    Sodium 135 (L) 136 - 145 mmol/L    Potassium 3.7 3.5 - 5.1 mmol/L    Chloride 103 95 - 110 mmol/L    CO2 24 23 - 29 mmol/L    Glucose 127 (H) 70 - 110 mg/dL    BUN 8 8 - 23 mg/dL    Creatinine 0.7 0.5 - 1.4 mg/dL    Calcium 8.8 8.7 - 10.5 mg/dL    Total Protein 5.3 (L) 6.0 - 8.4 g/dL    Albumin 3.0 (L) 3.5 - 5.2 g/dL    Total Bilirubin 1.4 (H) 0.1 - 1.0 mg/dL    Alkaline Phosphatase 111 55 - 135 U/L    AST 36 10 - 40 U/L    ALT 33 10 - 44 U/L    Anion Gap 8 8 - 16 mmol/L    eGFR if African American >60.0 >60 mL/min/1.73 m^2    eGFR if non African American >60.0 >60 mL/min/1.73 m^2   Magnesium    Collection Time: 01/04/22  5:11 AM   Result Value Ref Range    Magnesium 1.3 (L) 1.6 - 2.6 mg/dL   Phosphorus    Collection Time: 01/04/22  5:11 AM   Result Value Ref Range    Phosphorus 3.9 2.7 - 4.5 mg/dL   CBC auto differential    Collection Time: 01/04/22  5:11 AM   Result Value Ref Range    WBC  4.00 3.90 - 12.70 K/uL    RBC 2.94 (L) 4.00 - 5.40 M/uL    Hemoglobin 9.6 (L) 12.0 - 16.0 g/dL    Hematocrit 29.1 (L) 37.0 - 48.5 %    MCV 99 (H) 82 - 98 fL    MCH 32.7 (H) 27.0 - 31.0 pg    MCHC 33.0 32.0 - 36.0 g/dL    RDW 13.7 11.5 - 14.5 %    Platelets 65 (L) 150 - 450 K/uL    MPV 11.0 9.2 - 12.9 fL    Immature Granulocytes 0.0 0.0 - 0.5 %    Gran # (ANC) 2.3 1.8 - 7.7 K/uL    Immature Grans (Abs) 0.00 0.00 - 0.04 K/uL    Lymph # 1.1 1.0 - 4.8 K/uL    Mono # 0.4 0.3 - 1.0 K/uL    Eos # 0.2 0.0 - 0.5 K/uL    Baso # 0.01 0.00 - 0.20 K/uL    nRBC 0 0 /100 WBC    Gran % 57.1 38.0 - 73.0 %    Lymph % 27.5 18.0 - 48.0 %    Mono % 10.3 4.0 - 15.0 %    Eosinophil % 4.8 0.0 - 8.0 %    Basophil % 0.3 0.0 - 1.9 %    Differential Method Automated    Comprehensive Metabolic Panel    Collection Time: 01/10/22  3:18 PM   Result Value Ref Range    Sodium 137 136 - 145 mmol/L    Potassium 4.0 3.5 - 5.1 mmol/L    Chloride 102 95 - 110 mmol/L    CO2 27 23 - 29 mmol/L    Glucose 175 (H) 70 - 110 mg/dL    BUN 10 8 - 23 mg/dL    Creatinine 0.7 0.5 - 1.4 mg/dL    Calcium 9.5 8.7 - 10.5 mg/dL    Total Protein 7.0 6.0 - 8.4 g/dL    Albumin 3.6 3.5 - 5.2 g/dL    Total Bilirubin 1.3 (H) 0.1 - 1.0 mg/dL    Alkaline Phosphatase 96 55 - 135 U/L    AST 23 10 - 40 U/L    ALT 28 10 - 44 U/L    Anion Gap 8 8 - 16 mmol/L    eGFR if African American >60.0 >60 mL/min/1.73 m^2    eGFR if non African American >60.0 >60 mL/min/1.73 m^2   Lipase    Collection Time: 01/10/22  3:18 PM   Result Value Ref Range    Lipase 14 4 - 60 U/L   Iron and TIBC    Collection Time: 01/10/22  3:18 PM   Result Value Ref Range    Iron 108 30 - 160 ug/dL    Transferrin 221 200 - 375 mg/dL    TIBC 327 250 - 450 ug/dL    Saturated Iron 33 20 - 50 %   Ferritin    Collection Time: 01/10/22  3:18 PM   Result Value Ref Range    Ferritin 385 (H) 20.0 - 300.0 ng/mL   TISSUE TRANSGLUTAMINASE (TTG), IGA    Collection Time: 01/10/22  3:18 PM   Result Value Ref Range    TTG IgA 6 <20  UNITS   IgA    Collection Time: 01/10/22  3:18 PM   Result Value Ref Range    IgA 255 40 - 350 mg/dL   Hemoglobin    Collection Time: 01/10/22  3:18 PM   Result Value Ref Range    Hemoglobin 11.3 (L) 12.0 - 16.0 g/dL   CBC W/ AUTO DIFFERENTIAL    Collection Time: 01/12/22  2:19 PM   Result Value Ref Range    WBC 6.24 3.90 - 12.70 K/uL    RBC 3.70 (L) 4.00 - 5.40 M/uL    Hemoglobin 12.0 12.0 - 16.0 g/dL    Hematocrit 37.2 37.0 - 48.5 %     (H) 82 - 98 fL    MCH 32.4 (H) 27.0 - 31.0 pg    MCHC 32.3 32.0 - 36.0 g/dL    RDW 14.1 11.5 - 14.5 %    Platelets 104 (L) 150 - 450 K/uL    MPV 11.1 9.2 - 12.9 fL    Immature Granulocytes 0.3 0.0 - 0.5 %    Gran # (ANC) 4.2 1.8 - 7.7 K/uL    Immature Grans (Abs) 0.02 0.00 - 0.04 K/uL    Lymph # 1.3 1.0 - 4.8 K/uL    Mono # 0.6 0.3 - 1.0 K/uL    Eos # 0.2 0.0 - 0.5 K/uL    Baso # 0.03 0.00 - 0.20 K/uL    nRBC 0 0 /100 WBC    Gran % 66.5 38.0 - 73.0 %    Lymph % 20.0 18.0 - 48.0 %    Mono % 9.0 4.0 - 15.0 %    Eosinophil % 3.7 0.0 - 8.0 %    Basophil % 0.5 0.0 - 1.9 %    Differential Method Automated    Comp. Metabolic Panel    Collection Time: 01/12/22  2:19 PM   Result Value Ref Range    Sodium 134 (L) 136 - 145 mmol/L    Potassium 4.1 3.5 - 5.1 mmol/L    Chloride 101 95 - 110 mmol/L    CO2 27 23 - 29 mmol/L    Glucose 148 (H) 70 - 110 mg/dL    BUN 13 8 - 23 mg/dL    Creatinine 0.8 0.5 - 1.4 mg/dL    Calcium 9.7 8.7 - 10.5 mg/dL    Total Protein 7.2 6.0 - 8.4 g/dL    Albumin 3.7 3.5 - 5.2 g/dL    Total Bilirubin 1.6 (H) 0.1 - 1.0 mg/dL    Alkaline Phosphatase 89 55 - 135 U/L    AST 26 10 - 40 U/L    ALT 24 10 - 44 U/L    Anion Gap 6 (L) 8 - 16 mmol/L    eGFR if African American >60.0 >60 mL/min/1.73 m^2    eGFR if non African American >60.0 >60 mL/min/1.73 m^2   Lipase    Collection Time: 01/12/22  2:19 PM   Result Value Ref Range    Lipase 14 4 - 60 U/L   Lactic acid, plasma    Collection Time: 01/12/22  2:19 PM   Result Value Ref Range    Lactate (Lactic Acid) 1.2 0.5 -  2.2 mmol/L   Urinalysis, Reflex to Urine Culture Urine, Clean Catch    Collection Time: 01/12/22  2:28 PM    Specimen: Urine   Result Value Ref Range    Specimen UA Urine, Clean Catch     Color, UA Yellow Yellow, Straw, Vangie    Appearance, UA Hazy (A) Clear    pH, UA 8.0 5.0 - 8.0    Specific Gravity, UA 1.010 1.005 - 1.030    Protein, UA Negative Negative    Glucose, UA Negative Negative    Ketones, UA Negative Negative    Bilirubin (UA) Negative Negative    Occult Blood UA Negative Negative    Nitrite, UA Negative Negative    Leukocytes, UA 3+ (A) Negative   Urinalysis Microscopic    Collection Time: 01/12/22  2:28 PM   Result Value Ref Range    RBC, UA 6 (H) 0 - 4 /hpf    WBC, UA >100 (H) 0 - 5 /hpf    Bacteria Rare None-Occ /hpf    Squam Epithel, UA 0 /hpf    Microscopic Comment SEE COMMENT    Urine culture    Collection Time: 01/12/22  2:28 PM    Specimen: Urine   Result Value Ref Range    Urine Culture, Routine (A)      ENTEROCOCCUS FAECALIS  > 100,000 cfu/ml  No other significant isolate         Susceptibility    Enterococcus faecalis - CULTURE, URINE     Ampicillin <=2 Sensitive mcg/mL     Nitrofurantoin <=32 Sensitive mcg/mL     Vancomycin 4 Sensitive mcg/mL   ISTAT PROCEDURE    Collection Time: 01/12/22  2:29 PM   Result Value Ref Range    POC Glucose 146 (H) 70 - 110 mg/dL    POC BUN 13 6 - 30 mg/dL    POC Creatinine 0.8 0.5 - 1.4 mg/dL    POC Sodium 138 136 - 145 mmol/L    POC Potassium 4.1 3.5 - 5.1 mmol/L    POC Chloride 101 95 - 110 mmol/L    POC TCO2 (MEASURED) 27 23 - 29 mmol/L    POC Ionized Calcium 1.27 1.06 - 1.42 mmol/L    POC Hematocrit 35 (L) 36 - 54 %PCV    Sample JUANITO    POCT COVID-19 Rapid Screening    Collection Time: 01/12/22  6:08 PM   Result Value Ref Range    POC Rapid COVID Negative Negative     Acceptable Yes    POCT glucose    Collection Time: 01/12/22  8:48 PM   Result Value Ref Range    POCT Glucose 114 (H) 70 - 110 mg/dL   Comprehensive Metabolic Panel (CMP)     Collection Time: 01/13/22  3:23 AM   Result Value Ref Range    Sodium 138 136 - 145 mmol/L    Potassium 3.7 3.5 - 5.1 mmol/L    Chloride 105 95 - 110 mmol/L    CO2 24 23 - 29 mmol/L    Glucose 154 (H) 70 - 110 mg/dL    BUN 11 8 - 23 mg/dL    Creatinine 0.7 0.5 - 1.4 mg/dL    Calcium 8.8 8.7 - 10.5 mg/dL    Total Protein 6.0 6.0 - 8.4 g/dL    Albumin 3.1 (L) 3.5 - 5.2 g/dL    Total Bilirubin 1.5 (H) 0.1 - 1.0 mg/dL    Alkaline Phosphatase 82 55 - 135 U/L    AST 26 10 - 40 U/L    ALT 24 10 - 44 U/L    Anion Gap 9 8 - 16 mmol/L    eGFR if African American >60.0 >60 mL/min/1.73 m^2    eGFR if non African American >60.0 >60 mL/min/1.73 m^2   Magnesium    Collection Time: 01/13/22  3:23 AM   Result Value Ref Range    Magnesium 1.6 1.6 - 2.6 mg/dL   CBC with Automated Differential    Collection Time: 01/13/22  3:23 AM   Result Value Ref Range    WBC 5.13 3.90 - 12.70 K/uL    RBC 3.25 (L) 4.00 - 5.40 M/uL    Hemoglobin 10.3 (L) 12.0 - 16.0 g/dL    Hematocrit 32.1 (L) 37.0 - 48.5 %    MCV 99 (H) 82 - 98 fL    MCH 31.7 (H) 27.0 - 31.0 pg    MCHC 32.1 32.0 - 36.0 g/dL    RDW 13.9 11.5 - 14.5 %    Platelets 88 (L) 150 - 450 K/uL    MPV 10.6 9.2 - 12.9 fL    Immature Granulocytes 0.2 0.0 - 0.5 %    Gran # (ANC) 3.2 1.8 - 7.7 K/uL    Immature Grans (Abs) 0.01 0.00 - 0.04 K/uL    Lymph # 1.0 1.0 - 4.8 K/uL    Mono # 0.6 0.3 - 1.0 K/uL    Eos # 0.3 0.0 - 0.5 K/uL    Baso # 0.03 0.00 - 0.20 K/uL    nRBC 0 0 /100 WBC    Gran % 62.5 38.0 - 73.0 %    Lymph % 19.5 18.0 - 48.0 %    Mono % 12.3 4.0 - 15.0 %    Eosinophil % 4.9 0.0 - 8.0 %    Basophil % 0.6 0.0 - 1.9 %    Differential Method Automated    POCT glucose    Collection Time: 01/13/22  7:43 AM   Result Value Ref Range    POCT Glucose 141 (H) 70 - 110 mg/dL   POCT glucose    Collection Time: 01/13/22 11:28 AM   Result Value Ref Range    POCT Glucose 171 (H) 70 - 110 mg/dL   POCT glucose    Collection Time: 01/13/22  3:52 PM   Result Value Ref Range    POCT Glucose 223 (H) 70 -  110 mg/dL   POCT glucose    Collection Time: 01/13/22  8:20 PM   Result Value Ref Range    POCT Glucose 206 (H) 70 - 110 mg/dL   POCT glucose    Collection Time: 01/14/22  7:47 AM   Result Value Ref Range    POCT Glucose 151 (H) 70 - 110 mg/dL   POCT glucose    Collection Time: 01/14/22 11:40 AM   Result Value Ref Range    POCT Glucose 208 (H) 70 - 110 mg/dL   POCT glucose    Collection Time: 01/14/22  4:39 PM   Result Value Ref Range    POCT Glucose 101 70 - 110 mg/dL   POCT glucose    Collection Time: 01/14/22  8:23 PM   Result Value Ref Range    POCT Glucose 154 (H) 70 - 110 mg/dL   Comprehensive Metabolic Panel (CMP)    Collection Time: 01/15/22  3:29 AM   Result Value Ref Range    Sodium 136 136 - 145 mmol/L    Potassium 3.3 (L) 3.5 - 5.1 mmol/L    Chloride 106 95 - 110 mmol/L    CO2 23 23 - 29 mmol/L    Glucose 140 (H) 70 - 110 mg/dL    BUN 7 (L) 8 - 23 mg/dL    Creatinine 0.7 0.5 - 1.4 mg/dL    Calcium 8.9 8.7 - 10.5 mg/dL    Total Protein 5.8 (L) 6.0 - 8.4 g/dL    Albumin 3.0 (L) 3.5 - 5.2 g/dL    Total Bilirubin 0.9 0.1 - 1.0 mg/dL    Alkaline Phosphatase 92 55 - 135 U/L    AST 28 10 - 40 U/L    ALT 24 10 - 44 U/L    Anion Gap 7 (L) 8 - 16 mmol/L    eGFR if African American >60.0 >60 mL/min/1.73 m^2    eGFR if non African American >60.0 >60 mL/min/1.73 m^2   Magnesium    Collection Time: 01/15/22  3:29 AM   Result Value Ref Range    Magnesium 1.4 (L) 1.6 - 2.6 mg/dL   CBC with Automated Differential    Collection Time: 01/15/22  3:29 AM   Result Value Ref Range    WBC 3.17 (L) 3.90 - 12.70 K/uL    RBC 3.11 (L) 4.00 - 5.40 M/uL    Hemoglobin 10.0 (L) 12.0 - 16.0 g/dL    Hematocrit 30.7 (L) 37.0 - 48.5 %    MCV 99 (H) 82 - 98 fL    MCH 32.2 (H) 27.0 - 31.0 pg    MCHC 32.6 32.0 - 36.0 g/dL    RDW 13.7 11.5 - 14.5 %    Platelets 77 (L) 150 - 450 K/uL    MPV 10.8 9.2 - 12.9 fL    Immature Granulocytes 0.3 0.0 - 0.5 %    Gran # (ANC) 1.3 (L) 1.8 - 7.7 K/uL    Immature Grans (Abs) 0.01 0.00 - 0.04 K/uL    Lymph #  1.3 1.0 - 4.8 K/uL    Mono # 0.3 0.3 - 1.0 K/uL    Eos # 0.2 0.0 - 0.5 K/uL    Baso # 0.03 0.00 - 0.20 K/uL    nRBC 0 0 /100 WBC    Gran % 41.4 38.0 - 73.0 %    Lymph % 39.7 18.0 - 48.0 %    Mono % 10.4 4.0 - 15.0 %    Eosinophil % 7.3 0.0 - 8.0 %    Basophil % 0.9 0.0 - 1.9 %    Differential Method Automated    POCT glucose    Collection Time: 01/15/22  8:03 AM   Result Value Ref Range    POCT Glucose 120 (H) 70 - 110 mg/dL   POCT glucose    Collection Time: 01/15/22 11:15 AM   Result Value Ref Range    POCT Glucose 283 (H) 70 - 110 mg/dL   POCT glucose    Collection Time: 01/15/22  3:44 PM   Result Value Ref Range    POCT Glucose 230 (H) 70 - 110 mg/dL   CBC Auto Differential    Collection Time: 01/24/22  1:32 PM   Result Value Ref Range    WBC 5.70 3.90 - 12.70 K/uL    RBC 3.60 (L) 4.00 - 5.40 M/uL    Hemoglobin 11.8 (L) 12.0 - 16.0 g/dL    Hematocrit 36.8 (L) 37.0 - 48.5 %     (H) 82 - 98 fL    MCH 32.8 (H) 27.0 - 31.0 pg    MCHC 32.1 32.0 - 36.0 g/dL    RDW 13.7 11.5 - 14.5 %    Platelets 85 (L) 150 - 450 K/uL    MPV 11.6 9.2 - 12.9 fL    Immature Granulocytes 0.7 (H) 0.0 - 0.5 %    Gran # (ANC) 3.2 1.8 - 7.7 K/uL    Immature Grans (Abs) 0.04 0.00 - 0.04 K/uL    Lymph # 1.6 1.0 - 4.8 K/uL    Mono # 0.5 0.3 - 1.0 K/uL    Eos # 0.3 0.0 - 0.5 K/uL    Baso # 0.06 0.00 - 0.20 K/uL    nRBC 0 0 /100 WBC    Gran % 56.8 38.0 - 73.0 %    Lymph % 27.9 18.0 - 48.0 %    Mono % 8.4 4.0 - 15.0 %    Eosinophil % 5.1 0.0 - 8.0 %    Basophil % 1.1 0.0 - 1.9 %    Platelet Estimate Decreased (A)     Aniso Slight     Poik Slight     Poly Occasional     Hypo Occasional     Ovalocytes Occasional     Differential Method Automated    TSH    Collection Time: 01/24/22  1:32 PM   Result Value Ref Range    TSH 1.131 0.400 - 4.000 uIU/mL   Comprehensive Metabolic Panel    Collection Time: 01/24/22  1:32 PM   Result Value Ref Range    Sodium 132 (L) 136 - 145 mmol/L    Potassium 4.8 3.5 - 5.1 mmol/L    Chloride 98 95 - 110 mmol/L     CO2 23 23 - 29 mmol/L    Glucose 246 (H) 70 - 110 mg/dL    BUN 14 8 - 23 mg/dL    Creatinine 0.9 0.5 - 1.4 mg/dL    Calcium 9.7 8.7 - 10.5 mg/dL    Total Protein 7.6 6.0 - 8.4 g/dL    Albumin 3.6 3.5 - 5.2 g/dL    Total Bilirubin 1.1 (H) 0.1 - 1.0 mg/dL    Alkaline Phosphatase 87 55 - 135 U/L    AST 47 (H) 10 - 40 U/L    ALT 42 10 - 44 U/L    Anion Gap 11 8 - 16 mmol/L    eGFR if African American >60.0 >60 mL/min/1.73 m^2    eGFR if non African American >60.0 >60 mL/min/1.73 m^2   Magnesium    Collection Time: 01/24/22  1:32 PM   Result Value Ref Range    Magnesium 1.6 1.6 - 2.6 mg/dL   Hemoglobin A1C    Collection Time: 01/24/22  1:32 PM   Result Value Ref Range    Hemoglobin A1C 6.9 (H) 4.0 - 5.6 %    Estimated Avg Glucose 151 (H) 68 - 131 mg/dL   Ferritin    Collection Time: 01/24/22  1:32 PM   Result Value Ref Range    Ferritin 455 (H) 20.0 - 300.0 ng/mL   Iron and TIBC    Collection Time: 01/24/22  1:32 PM   Result Value Ref Range    Iron 112 30 - 160 ug/dL    Transferrin 266 200 - 375 mg/dL    TIBC 394 250 - 450 ug/dL    Saturated Iron 28 20 - 50 %   Vitamin D    Collection Time: 01/24/22  1:32 PM   Result Value Ref Range    Vit D, 25-Hydroxy 35 30 - 96 ng/mL   Comprehensive metabolic panel    Collection Time: 01/25/22 10:10 PM   Result Value Ref Range    Sodium 134 (L) 136 - 145 mmol/L    Potassium 4.4 3.5 - 5.1 mmol/L    Chloride 99 95 - 110 mmol/L    CO2 24 23 - 29 mmol/L    Glucose 228 (H) 70 - 110 mg/dL    BUN 13 8 - 23 mg/dL    Creatinine 0.8 0.5 - 1.4 mg/dL    Calcium 10.0 8.7 - 10.5 mg/dL    Total Protein 7.3 6.0 - 8.4 g/dL    Albumin 3.6 3.5 - 5.2 g/dL    Total Bilirubin 1.9 (H) 0.1 - 1.0 mg/dL    Alkaline Phosphatase 100 55 - 135 U/L    AST 81 (H) 10 - 40 U/L    ALT 66 (H) 10 - 44 U/L    Anion Gap 11 8 - 16 mmol/L    eGFR if African American >60.0 >60 mL/min/1.73 m^2    eGFR if non African American >60.0 >60 mL/min/1.73 m^2   Lipase    Collection Time: 01/25/22 10:10 PM   Result Value Ref Range     Lipase 22 4 - 60 U/L   ISTAT PROCEDURE    Collection Time: 01/25/22 10:13 PM   Result Value Ref Range    POC Glucose 229 (H) 70 - 110 mg/dL    POC BUN 16 6 - 30 mg/dL    POC Creatinine 0.8 0.5 - 1.4 mg/dL    POC Sodium 136 136 - 145 mmol/L    POC Potassium 4.4 3.5 - 5.1 mmol/L    POC Chloride 99 95 - 110 mmol/L    POC TCO2 (MEASURED) 29 23 - 29 mmol/L    POC Ionized Calcium 1.18 1.06 - 1.42 mmol/L    POC Hematocrit 36 36 - 54 %PCV    Sample JUANITO    Lactic acid, plasma    Collection Time: 01/25/22 11:00 PM   Result Value Ref Range    Lactate (Lactic Acid) 1.7 0.5 - 2.2 mmol/L   CBC auto differential    Collection Time: 01/25/22 11:00 PM   Result Value Ref Range    WBC 8.23 3.90 - 12.70 K/uL    RBC 3.41 (L) 4.00 - 5.40 M/uL    Hemoglobin 11.1 (L) 12.0 - 16.0 g/dL    Hematocrit 34.4 (L) 37.0 - 48.5 %     (H) 82 - 98 fL    MCH 32.6 (H) 27.0 - 31.0 pg    MCHC 32.3 32.0 - 36.0 g/dL    RDW 13.7 11.5 - 14.5 %    Platelets 91 (L) 150 - 450 K/uL    MPV 11.7 9.2 - 12.9 fL    Immature Granulocytes 0.4 0.0 - 0.5 %    Gran # (ANC) 6.5 1.8 - 7.7 K/uL    Immature Grans (Abs) 0.03 0.00 - 0.04 K/uL    Lymph # 0.8 (L) 1.0 - 4.8 K/uL    Mono # 0.7 0.3 - 1.0 K/uL    Eos # 0.2 0.0 - 0.5 K/uL    Baso # 0.03 0.00 - 0.20 K/uL    nRBC 0 0 /100 WBC    Gran % 78.4 (H) 38.0 - 73.0 %    Lymph % 10.0 (L) 18.0 - 48.0 %    Mono % 7.9 4.0 - 15.0 %    Eosinophil % 2.9 0.0 - 8.0 %    Basophil % 0.4 0.0 - 1.9 %    Differential Method Automated    Urinalysis, Reflex to Urine Culture Urine, Clean Catch    Collection Time: 01/26/22 12:35 AM    Specimen: Urine   Result Value Ref Range    Specimen UA Urine, Clean Catch     Color, UA Straw Yellow, Straw, Vangie    Appearance, UA Clear Clear    pH, UA 7.0 5.0 - 8.0    Specific Gravity, UA 1.020 1.005 - 1.030    Protein, UA Negative Negative    Glucose, UA Negative Negative    Ketones, UA Negative Negative    Bilirubin (UA) Negative Negative    Occult Blood UA Negative Negative    Nitrite, UA Negative  Negative    Leukocytes, UA Negative Negative          Assessment/Plan:     1) Abdominal pains, Constipation/Diarrhea, Colon ca resection hx (2011), SBO hx  - Intermittent issue over the years but now very frequent with trips to ER/hospital. Fortunately relatively stable bowels since last weeks ER visit. Following with GI. Seeing Surgery for evaluation soon. Continuing daily Metamucil with focus on hydration, daily Miralax. Pt and family inquired about getting the oral contrast typically given for ER CT scans since this seems to relieve bowels effectively. Discussed I will look into alternatives, but my initial thought is to use a full 296 mL bottle of Mg citrate which should have same osmotic effect.    2) T2DM - Recently stopped Metformin 500 TID.  A1c last month 6.9%. Arm monitor noting bg levels over 200 recently but family notes some discrepancy with finger sticks? Advised at least daily doing a check of both arm and finger for comparison. Will f/u early next week to consider med addition if needed. SGLT-2 poor option with urinary issues. GLP-1 not an option with bowels, would be cautious even with Januvia.    3) Urinary incontinence - Following with Urology, next appt later this week. On Myrbetriq with little effect. Of note, recent concern for mild right hydronephrosis was not noted on CT scan done last week for abdominal pain.    Will f/u closely, including specialist recommendations as available.

## 2022-02-08 ENCOUNTER — DOCUMENTATION ONLY (OUTPATIENT)
Dept: SURGERY | Facility: CLINIC | Age: 77
End: 2022-02-08
Payer: MEDICARE

## 2022-02-08 ENCOUNTER — OFFICE VISIT (OUTPATIENT)
Dept: SURGERY | Facility: CLINIC | Age: 77
DRG: 389 | End: 2022-02-08
Payer: MEDICARE

## 2022-02-08 ENCOUNTER — TELEPHONE (OUTPATIENT)
Dept: SURGERY | Facility: CLINIC | Age: 77
End: 2022-02-08
Payer: MEDICARE

## 2022-02-08 VITALS
BODY MASS INDEX: 24.09 KG/M2 | DIASTOLIC BLOOD PRESSURE: 58 MMHG | WEIGHT: 130.94 LBS | HEIGHT: 62 IN | SYSTOLIC BLOOD PRESSURE: 124 MMHG | HEART RATE: 69 BPM

## 2022-02-08 DIAGNOSIS — K66.0 ADHESION OF ABDOMINAL WALL: Primary | ICD-10-CM

## 2022-02-08 DIAGNOSIS — K56.609 SBO (SMALL BOWEL OBSTRUCTION): Chronic | ICD-10-CM

## 2022-02-08 PROCEDURE — 99215 OFFICE O/P EST HI 40 MIN: CPT | Mod: PBBFAC | Performed by: SURGERY

## 2022-02-08 PROCEDURE — 99214 OFFICE O/P EST MOD 30 MIN: CPT | Mod: S$PBB,,, | Performed by: SURGERY

## 2022-02-08 PROCEDURE — 99999 PR PBB SHADOW E&M-EST. PATIENT-LVL V: CPT | Mod: PBBFAC,,, | Performed by: SURGERY

## 2022-02-08 PROCEDURE — 99214 PR OFFICE/OUTPT VISIT, EST, LEVL IV, 30-39 MIN: ICD-10-PCS | Mod: S$PBB,,, | Performed by: SURGERY

## 2022-02-08 PROCEDURE — 99999 PR PBB SHADOW E&M-EST. PATIENT-LVL V: ICD-10-PCS | Mod: PBBFAC,,, | Performed by: SURGERY

## 2022-02-08 NOTE — H&P (VIEW-ONLY)
Subjective:      Anayeli Judd is a 77 y.o. year old female hx of DM, colon cancer(s/p transverse colectomy), UE DVT(no blood thinners), who presents to the general surgery clinic on 02/08/2022  for recurrent episodes of abdominal pain requiring multiple ER visit with SBO.. Pt has been having these episodes for a while no, however, she feels a though they have become more frequently since thanksgiving. They have be to the ER 5 times since then. Pt notes crampy abdominal pain. She notes she had a an episode of vomiting with 1 episode but usually does not experience nausea or vomiting. When she goes to the ED her symptoms resolve after she is given contrast for imaging study. She denies other complaints.     Surg Hx: Open transverse colectomy, open cholecystectomy   Social: Non-smoker     Vitals:    02/08/22 1033   BP: (!) 124/58   Pulse: 69        Patient Active Problem List   Diagnosis    Lumbar spondylosis    Cervical spondylosis with radiculopathy    Insomnia    Carpal tunnel syndrome    Headache(784.0)    Hypothyroid    History of colon cancer    Irritable bowel syndrome with both constipation and diarrhea    Chronic sinusitis of right maxilla    Diarrhea    Pelvic muscle wasting    GERD (gastroesophageal reflux disease)    Fatty liver    Partial small bowel obstruction    Family history of breast cancer    Numbness of face    Gait instability    Type 2 diabetes mellitus without complication, without long-term current use of insulin    Acute deep vein thrombosis (DVT) of upper extremity    Wound infection after surgery    Hematoma    Chronic diarrhea    Fecal incontinence    Generalized abdominal pain    Nausea    Vitamin B12 deficiency    Vaginal atrophy    Incomplete emptying of bladder    Mixed stress and urge urinary incontinence    Vaginal irritation    Fecal soiling    Irregular bowel habits    Elevated serum creatinine    Hypomagnesemia    Hydronephrosis    Acute  cystitis without hematuria    Prolapse of female genital organs    Rectocele    Iron deficiency anemia due to chronic blood loss    Postmenopausal bleeding    SBO (small bowel obstruction)    Thrombocytopenia    Myelodysplastic syndrome    Constipation    Bilious vomiting with nausea    Parkinsonism    Memory change    Other chest pain    Atrophic pancreas    Other hyperlipidemia    Type 2 diabetes mellitus with diabetic peripheral angiopathy without gangrene, without long-term current use of insulin    Abdominal discomfort       Current Outpatient Medications   Medication Sig Dispense Refill    ascorbic Acid (VITAMIN C) 500 mg CpSR Take 500 mg by mouth once daily.       atorvastatin (LIPITOR) 40 MG tablet TAKE 1 TABLET BY MOUTH  DAILY 90 tablet 3    biotin 5,000 mcg TbDL Take 1 tablet by mouth once daily.       butalbital-acetaminophen-caffeine -40 mg (FIORICET, ESGIC) -40 mg per tablet SMARTSI Tablet(s) By Mouth 1 to 3 Times Daily PRN      clotrimazole-betamethasone 1-0.05% (LOTRISONE) cream Apply topically 2 (two) times daily.      co-enzyme Q-10 30 mg capsule Take 30 mg by mouth 3 (three) times daily.      conjugated estrogens (PREMARIN) vaginal cream INSERT 1/2 GRAM VAGINALLY  TWICE WEEKLY 30 g 3    cranberry extract 200 mg Cap 1 capsule DAILY (route: oral)      D-MANNOSE ORAL Take 1 tablet by mouth once daily.       dicyclomine (BENTYL) 20 mg tablet Take 1 tablet (20 mg total) by mouth 2 (two) times daily. for 15 days 30 tablet 0    DULoxetine (CYMBALTA) 30 MG capsule Take 1 capsule (30 mg total) by mouth 2 (two) times daily. 180 capsule 3    flash glucose sensor (FREESTYLE EFREN 14 DAY SENSOR) Kit 1 application by Misc.(Non-Drug; Combo Route) route every 14 (fourteen) days. Disp 30 or 90 day refill 6 kit 6    FREESTYLE EFREN 10 DAY READ[place] [facility] [place] 3 each 3    FREESTYLE LITE STRIPS Strp TEST DAILY AS DIRECTED 50 strip 2    hydrocortisone 2.5 % cream  Apply topically 2 (two) times daily as needed. 1 each 1    hyoscyamine (ANASPAZ,LEVSIN) 0.125 mg Tab Take 1 tablet (125 mcg total) by mouth every 8 (eight) hours as needed (urgency). 90 tablet 3    Lactobacillus rhamnosus GG (CULTURELLE) 10 billion cell capsule Take 1 capsule by mouth once daily. 30 capsule 0    levothyroxine (SYNTHROID) 75 MCG tablet Take 1 tablet (75 mcg total) by mouth before breakfast. 90 tablet 3    LIDOcaine (LIDODERM) 5 % Place 1 patch onto the skin once daily. Remove & Discard patch within 12 hours or as directed by MD 30 patch 0    magnesium 30 mg Tab Take 500 capsules by mouth.       montelukast (SINGULAIR) 10 mg tablet TAKE 1 TABLET BY MOUTH  DAILY 90 tablet 3    omega 3-dha-epa-fish oil 1,200 (144-216) mg Cap Take 1 capsule by mouth once daily.       ondansetron (ZOFRAN-ODT) 8 MG TbDL Take 1 tablet (8 mg total) by mouth every 8 (eight) hours as needed (nausea). 30 tablet 0    pantoprazole (PROTONIX) 20 MG tablet Take 1 tablet (20 mg total) by mouth once daily. 30 tablet 2    polyethylene glycol (GLYCOLAX) 17 gram/dose powder Take 17 g by mouth before dinner. 1530 g 1    promethazine-codeine 6.25-10 mg/5 ml (PHENERGAN WITH CODEINE) 6.25-10 mg/5 mL syrup Take 5 mLs by mouth every 4 to 6 hours as needed for Cough. 240 mL 0    simethicone (MYLICON) 80 MG chewable tablet Take 1 tablet (80 mg total) by mouth every 6 (six) hours as needed for Flatulence. 30 tablet 0    valACYclovir (VALTREX) 500 MG tablet Take 1 tablet (500 mg total) by mouth 3 (three) times daily. 30 tablet 0    vitamin D 1000 units Tab Take 185 mg by mouth once daily. D3      azelastine (ASTELIN) 137 mcg (0.1 %) nasal spray 2 sprays (274 mcg total) by Nasal route 2 (two) times daily. 30 mL 1    cholestyramine-aspartame (CHOLESTYRAMINE LIGHT) 4 gram PwPk Take 1 packet (4 g total) by mouth daily as needed (Diarrhea). (Patient not taking: Reported on 2/8/2022) 90 packet 0    psyllium husk, aspartame, (METAMUCIL)  3.4 gram PwPk packet Take 1 packet by mouth 2 (two) times daily. (Patient not taking: Reported on 2/8/2022) 60 packet 0    senna-docusate 8.6-50 mg (PERICOLACE) 8.6-50 mg per tablet Take 1 tablet by mouth 2 (two) times daily. (Patient not taking: Reported on 2/8/2022) 60 tablet 2     No current facility-administered medications for this visit.       Review of Systems:  10+ ROS negative except HPI  Objective:     Physical Exam:  General: No acute distress  HEENT: atraumatic  Resp: No resp distress, effort normal, normal chest movements   Cardiac: Normal rate  Abdomen: Soft, NT, ND. Midline laparotomy, subcostal incision right   Skin: warm and dry   Neuro: AOX4, at baseline  Psych: Behavior normal       Assessment:       1. SBO (small bowel obstruction)      77F with hx of transverse colectomy(open) with persistent crampy abdominal pain episodes of SBO requiring ER visit. Possible chronic stricture due to adhesions.   Plan:   EUS w/ possible bx for thickening around SMA  Possible to OR for chronic stricture due to adhesions

## 2022-02-08 NOTE — PROGRESS NOTES
I have seen the patient, reviewed the Resident's history and physical, assessment and plan. I have personally interviewed and examined the patient at bedside and: agree with the findings.     77-year-old female with a past medical history of transverse colon cancer s/p resection 2010 and xlap/sparkle/bowel resection times 2, IBS, HLD, upper arm dvt, T2 DM and multiple episodes of SBO.  She has been to the ER 5 times for recurrent sbo symptoms.  She has no quality of life due to this.  She lives alone at home and has help with ADLs (house work and grocery shopping).    Cbc, cmp reviewed, mild anemia, elevated liver enzymes  Ugi sbf reviewed, films viewed, mild dilation of some small bowel loops but normal transit time  Ct reviewed, films viewed, dilated and thickened small bowel loops, area of thickening at SMA of unknown significance  Ct enterography reviewed, films viewed, dilation of small bowel at anastomosis sites  Cscope 2019 reviewed, ok to the distal ileum    Area of thickening at sma of unknown significance, obtain eus with possible biopsy (consider pet).  Open for chronic stricture due to adhesions  (80% chance of improvement which may last 3-5 years, 5-10% chance of major complication).  Pcp clearance.     I discussed tissue around sma with Dr. Marte and he thinks this is not worth a biopsy.

## 2022-02-08 NOTE — PROGRESS NOTES
Subjective:      Anayeli Judd is a 77 y.o. year old female hx of DM, colon cancer(s/p transverse colectomy), UE DVT(no blood thinners), who presents to the general surgery clinic on 02/08/2022  for recurrent episodes of abdominal pain requiring multiple ER visit with SBO.. Pt has been having these episodes for a while no, however, she feels a though they have become more frequently since thanksgiving. They have be to the ER 5 times since then. Pt notes crampy abdominal pain. She notes she had a an episode of vomiting with 1 episode but usually does not experience nausea or vomiting. When she goes to the ED her symptoms resolve after she is given contrast for imaging study. She denies other complaints.     Surg Hx: Open transverse colectomy, open cholecystectomy   Social: Non-smoker     Vitals:    02/08/22 1033   BP: (!) 124/58   Pulse: 69        Patient Active Problem List   Diagnosis    Lumbar spondylosis    Cervical spondylosis with radiculopathy    Insomnia    Carpal tunnel syndrome    Headache(784.0)    Hypothyroid    History of colon cancer    Irritable bowel syndrome with both constipation and diarrhea    Chronic sinusitis of right maxilla    Diarrhea    Pelvic muscle wasting    GERD (gastroesophageal reflux disease)    Fatty liver    Partial small bowel obstruction    Family history of breast cancer    Numbness of face    Gait instability    Type 2 diabetes mellitus without complication, without long-term current use of insulin    Acute deep vein thrombosis (DVT) of upper extremity    Wound infection after surgery    Hematoma    Chronic diarrhea    Fecal incontinence    Generalized abdominal pain    Nausea    Vitamin B12 deficiency    Vaginal atrophy    Incomplete emptying of bladder    Mixed stress and urge urinary incontinence    Vaginal irritation    Fecal soiling    Irregular bowel habits    Elevated serum creatinine    Hypomagnesemia    Hydronephrosis    Acute  cystitis without hematuria    Prolapse of female genital organs    Rectocele    Iron deficiency anemia due to chronic blood loss    Postmenopausal bleeding    SBO (small bowel obstruction)    Thrombocytopenia    Myelodysplastic syndrome    Constipation    Bilious vomiting with nausea    Parkinsonism    Memory change    Other chest pain    Atrophic pancreas    Other hyperlipidemia    Type 2 diabetes mellitus with diabetic peripheral angiopathy without gangrene, without long-term current use of insulin    Abdominal discomfort       Current Outpatient Medications   Medication Sig Dispense Refill    ascorbic Acid (VITAMIN C) 500 mg CpSR Take 500 mg by mouth once daily.       atorvastatin (LIPITOR) 40 MG tablet TAKE 1 TABLET BY MOUTH  DAILY 90 tablet 3    biotin 5,000 mcg TbDL Take 1 tablet by mouth once daily.       butalbital-acetaminophen-caffeine -40 mg (FIORICET, ESGIC) -40 mg per tablet SMARTSI Tablet(s) By Mouth 1 to 3 Times Daily PRN      clotrimazole-betamethasone 1-0.05% (LOTRISONE) cream Apply topically 2 (two) times daily.      co-enzyme Q-10 30 mg capsule Take 30 mg by mouth 3 (three) times daily.      conjugated estrogens (PREMARIN) vaginal cream INSERT 1/2 GRAM VAGINALLY  TWICE WEEKLY 30 g 3    cranberry extract 200 mg Cap 1 capsule DAILY (route: oral)      D-MANNOSE ORAL Take 1 tablet by mouth once daily.       dicyclomine (BENTYL) 20 mg tablet Take 1 tablet (20 mg total) by mouth 2 (two) times daily. for 15 days 30 tablet 0    DULoxetine (CYMBALTA) 30 MG capsule Take 1 capsule (30 mg total) by mouth 2 (two) times daily. 180 capsule 3    flash glucose sensor (FREESTYLE EFREN 14 DAY SENSOR) Kit 1 application by Misc.(Non-Drug; Combo Route) route every 14 (fourteen) days. Disp 30 or 90 day refill 6 kit 6    FREESTYLE EFREN 10 DAY READER [facility] TEST as directed 3 each 3    FREESTYLE LITE STRIPS Strp TEST DAILY AS DIRECTED 50 strip 2    hydrocortisone 2.5 % cream  Apply topically 2 (two) times daily as needed. 1 each 1    hyoscyamine (ANASPAZ,LEVSIN) 0.125 mg Tab Take 1 tablet (125 mcg total) by mouth every 8 (eight) hours as needed (urgency). 90 tablet 3    Lactobacillus rhamnosus GG (CULTURELLE) 10 billion cell capsule Take 1 capsule by mouth once daily. 30 capsule 0    levothyroxine (SYNTHROID) 75 MCG tablet Take 1 tablet (75 mcg total) by mouth before breakfast. 90 tablet 3    LIDOcaine (LIDODERM) 5 % Place 1 patch onto the skin once daily. Remove & Discard patch within 12 hours or as directed by MD 30 patch 0    magnesium 30 mg Tab Take 500 capsules by mouth.       montelukast (SINGULAIR) 10 mg tablet TAKE 1 TABLET BY MOUTH  DAILY 90 tablet 3    omega 3-dha-epa-fish oil 1,200 (144-216) mg Cap Take 1 capsule by mouth once daily.       ondansetron (ZOFRAN-ODT) 8 MG TbDL Take 1 tablet (8 mg total) by mouth every 8 (eight) hours as needed (nausea). 30 tablet 0    pantoprazole (PROTONIX) 20 MG tablet Take 1 tablet (20 mg total) by mouth once daily. 30 tablet 2    polyethylene glycol (GLYCOLAX) 17 gram/dose powder Take 17 g by mouth before dinner. 1530 g 1    promethazine-codeine 6.25-10 mg/5 ml (PHENERGAN WITH CODEINE) 6.25-10 mg/5 mL syrup Take 5 mLs by mouth every 4 to 6 hours as needed for Cough. 240 mL 0    simethicone (MYLICON) 80 MG chewable tablet Take 1 tablet (80 mg total) by mouth every 6 (six) hours as needed for Flatulence. 30 tablet 0    valACYclovir (VALTREX) 500 MG tablet Take 1 tablet (500 mg total) by mouth 3 (three) times daily. 30 tablet 0    vitamin D 1000 units Tab Take 185 mg by mouth once daily. D3      azelastine (ASTELIN) 137 mcg (0.1 %) nasal spray 2 sprays (274 mcg total) by Nasal route 2 (two) times daily. 30 mL 1    cholestyramine-aspartame (CHOLESTYRAMINE LIGHT) 4 gram PwPk Take 1 packet (4 g total) by mouth daily as needed (Diarrhea). (Patient not taking: Reported on 2/8/2022) 90 packet 0    psyllium husk, aspartame, (METAMUCIL)  3.4 gram PwPk packet Take 1 packet by mouth 2 (two) times daily. (Patient not taking: Reported on 2/8/2022) 60 packet 0    senna-docusate 8.6-50 mg (PERICOLACE) 8.6-50 mg per tablet Take 1 tablet by mouth 2 (two) times daily. (Patient not taking: Reported on 2/8/2022) 60 tablet 2     No current facility-administered medications for this visit.       Review of Systems:  10+ ROS negative except HPI  Objective:     Physical Exam:  General: No acute distress  HEENT: atraumatic  Resp: No resp distress, effort normal, normal chest movements   Cardiac: Normal rate  Abdomen: Soft, NT, ND. Midline laparotomy, subcostal incision right   Skin: warm and dry   Neuro: AOX4, at baseline  Psych: Behavior normal       Assessment:       1. SBO (small bowel obstruction)      77F with hx of transverse colectomy(open) with persistent crampy abdominal pain episodes of SBO requiring ER visit. Possible chronic stricture due to adhesions.   Plan:   EUS w/ possible bx for thickening around SMA  Possible to OR for chronic stricture due to adhesions

## 2022-02-08 NOTE — LETTER
[facility]  [address]  [facility] [address]  Phone: [phone] February 8, 2022        Ramiro Jefferson MD  [address]  [facility] [address]    Patient: Anayeli Judd   MR Number: [id number]   YOB: 1945   Date of Visit: 2/8/2022     Dear Dr. Jefferson:    Thank you for referring Anayeli Judd to me for evaluation. Attached you will find relevant portions of my assessment and plan of care.    If you have questions, please do not hesitate to call me. I look forward to following Anayeli Judd along with you.    Sincerely,          Leonidas Barriga MD      CC  MD Farideh Dayosure

## 2022-02-08 NOTE — PROGRESS NOTES
Sent request via in basket to Dr. Guthrie for medical clearance for this patient's surgery on 03/04/2022.

## 2022-02-08 NOTE — TELEPHONE ENCOUNTER
Dr. Guthrie wrote in response to clearance request that he is going to see her this week for evaluation. And will respond to the request post haste.

## 2022-02-09 ENCOUNTER — DOCUMENT SCAN (OUTPATIENT)
Dept: HOME HEALTH SERVICES | Facility: HOSPITAL | Age: 77
End: 2022-02-09
Payer: MEDICARE

## 2022-02-09 ENCOUNTER — OFFICE VISIT (OUTPATIENT)
Dept: INTERNAL MEDICINE | Facility: CLINIC | Age: 77
DRG: 389 | End: 2022-02-09
Payer: MEDICARE

## 2022-02-09 ENCOUNTER — CLINICAL SUPPORT (OUTPATIENT)
Dept: INTERNAL MEDICINE | Facility: CLINIC | Age: 77
DRG: 389 | End: 2022-02-09
Payer: MEDICARE

## 2022-02-09 ENCOUNTER — HOSPITAL ENCOUNTER (INPATIENT)
Facility: HOSPITAL | Age: 77
LOS: 2 days | Discharge: HOME HEALTH | DRG: 389 | End: 2022-02-12
Attending: EMERGENCY MEDICINE | Admitting: HOSPITALIST
Payer: MEDICARE

## 2022-02-09 VITALS
HEIGHT: 62 IN | HEART RATE: 80 BPM | DIASTOLIC BLOOD PRESSURE: 70 MMHG | TEMPERATURE: 99 F | WEIGHT: 131.19 LBS | SYSTOLIC BLOOD PRESSURE: 111 MMHG | BODY MASS INDEX: 24.14 KG/M2

## 2022-02-09 DIAGNOSIS — D69.6 THROMBOCYTOPENIA: ICD-10-CM

## 2022-02-09 DIAGNOSIS — R10.9 ABDOMINAL PAIN, UNSPECIFIED ABDOMINAL LOCATION: ICD-10-CM

## 2022-02-09 DIAGNOSIS — R30.0 DYSURIA: Primary | ICD-10-CM

## 2022-02-09 DIAGNOSIS — N39.0 URINARY TRACT INFECTION WITHOUT HEMATURIA, SITE UNSPECIFIED: ICD-10-CM

## 2022-02-09 DIAGNOSIS — J31.0 RHINITIS: ICD-10-CM

## 2022-02-09 DIAGNOSIS — K59.00 CONSTIPATION, UNSPECIFIED CONSTIPATION TYPE: ICD-10-CM

## 2022-02-09 DIAGNOSIS — R30.0 DYSURIA: ICD-10-CM

## 2022-02-09 DIAGNOSIS — N39.0 FREQUENT UTI: Primary | ICD-10-CM

## 2022-02-09 DIAGNOSIS — K56.609 SBO (SMALL BOWEL OBSTRUCTION): Primary | ICD-10-CM

## 2022-02-09 DIAGNOSIS — R11.2 NON-INTRACTABLE VOMITING WITH NAUSEA, UNSPECIFIED VOMITING TYPE: ICD-10-CM

## 2022-02-09 LAB
BACTERIA #/AREA URNS AUTO: ABNORMAL /HPF
BILIRUB UR QL STRIP: NEGATIVE
BILIRUB UR QL STRIP: NEGATIVE
CLARITY UR REFRACT.AUTO: ABNORMAL
COLOR UR AUTO: YELLOW
GLUCOSE UR QL STRIP: NEGATIVE
GLUCOSE UR QL STRIP: NEGATIVE
HGB UR QL STRIP: ABNORMAL
HYALINE CASTS UR QL AUTO: 0 /LPF
KETONES UR QL STRIP: NEGATIVE
KETONES UR QL STRIP: NEGATIVE
LEUKOCYTE ESTERASE UR QL STRIP: ABNORMAL
LEUKOCYTE ESTERASE UR QL STRIP: POSITIVE
MICROSCOPIC COMMENT: ABNORMAL
NITRITE UR QL STRIP: NEGATIVE
PH UR STRIP: 7 [PH] (ref 5–8)
PH, POC UA: 7
POC BLOOD, URINE: POSITIVE
POC NITRATES, URINE: NEGATIVE
PROT UR QL STRIP: ABNORMAL
PROT UR QL STRIP: POSITIVE
RBC #/AREA URNS AUTO: 10 /HPF (ref 0–4)
SP GR UR STRIP: 1.01 (ref 1–1.03)
SP GR UR STRIP: 1.01 (ref 1–1.03)
URN SPEC COLLECT METH UR: ABNORMAL
UROBILINOGEN UR STRIP-ACNC: NORMAL (ref 0.1–1.1)
WBC #/AREA URNS AUTO: >100 /HPF (ref 0–5)
WBC CLUMPS UR QL AUTO: ABNORMAL
YEAST UR QL AUTO: ABNORMAL

## 2022-02-09 PROCEDURE — 87205 SMEAR GRAM STAIN: CPT | Performed by: INTERNAL MEDICINE

## 2022-02-09 PROCEDURE — 81001 URINALYSIS AUTO W/SCOPE: CPT | Performed by: INTERNAL MEDICINE

## 2022-02-09 PROCEDURE — 81003 URINALYSIS AUTO W/O SCOPE: CPT | Mod: PBBFAC

## 2022-02-09 PROCEDURE — 99214 PR OFFICE/OUTPT VISIT, EST, LEVL IV, 30-39 MIN: ICD-10-PCS | Mod: S$PBB,,, | Performed by: INTERNAL MEDICINE

## 2022-02-09 PROCEDURE — 87086 URINE CULTURE/COLONY COUNT: CPT | Performed by: INTERNAL MEDICINE

## 2022-02-09 PROCEDURE — 99285 EMERGENCY DEPT VISIT HI MDM: CPT | Mod: CS,,, | Performed by: EMERGENCY MEDICINE

## 2022-02-09 PROCEDURE — 87088 URINE BACTERIA CULTURE: CPT | Performed by: INTERNAL MEDICINE

## 2022-02-09 PROCEDURE — 99285 EMERGENCY DEPT VISIT HI MDM: CPT | Mod: 25,27

## 2022-02-09 PROCEDURE — U0002 COVID-19 LAB TEST NON-CDC: HCPCS | Performed by: EMERGENCY MEDICINE

## 2022-02-09 PROCEDURE — 99999 PR PBB SHADOW E&M-EST. PATIENT-LVL III: ICD-10-PCS | Mod: PBBFAC,,, | Performed by: INTERNAL MEDICINE

## 2022-02-09 PROCEDURE — 99214 OFFICE O/P EST MOD 30 MIN: CPT | Mod: S$PBB,,, | Performed by: INTERNAL MEDICINE

## 2022-02-09 PROCEDURE — 80053 COMPREHEN METABOLIC PANEL: CPT | Performed by: EMERGENCY MEDICINE

## 2022-02-09 PROCEDURE — 83690 ASSAY OF LIPASE: CPT | Performed by: EMERGENCY MEDICINE

## 2022-02-09 PROCEDURE — 87186 SC STD MICRODIL/AGAR DIL: CPT | Performed by: INTERNAL MEDICINE

## 2022-02-09 PROCEDURE — 99213 OFFICE O/P EST LOW 20 MIN: CPT | Mod: PBBFAC | Performed by: INTERNAL MEDICINE

## 2022-02-09 PROCEDURE — 99285 PR EMERGENCY DEPT VISIT,LEVEL V: ICD-10-PCS | Mod: CS,,, | Performed by: EMERGENCY MEDICINE

## 2022-02-09 PROCEDURE — 25000003 PHARM REV CODE 250: Performed by: EMERGENCY MEDICINE

## 2022-02-09 PROCEDURE — 99999 PR PBB SHADOW E&M-EST. PATIENT-LVL III: CPT | Mod: PBBFAC,,, | Performed by: INTERNAL MEDICINE

## 2022-02-09 PROCEDURE — 96361 HYDRATE IV INFUSION ADD-ON: CPT

## 2022-02-09 PROCEDURE — 87077 CULTURE AEROBIC IDENTIFY: CPT | Performed by: INTERNAL MEDICINE

## 2022-02-09 PROCEDURE — 85025 COMPLETE CBC W/AUTO DIFF WBC: CPT | Performed by: EMERGENCY MEDICINE

## 2022-02-09 RX ORDER — LACTULOSE 10 G/15ML
20 SOLUTION ORAL; RECTAL DAILY
Qty: 473 ML | Refills: 5 | Status: ON HOLD | OUTPATIENT
Start: 2022-02-09 | End: 2022-02-12 | Stop reason: HOSPADM

## 2022-02-09 RX ORDER — HYDROCODONE BITARTRATE AND ACETAMINOPHEN 5; 325 MG/1; MG/1
1 TABLET ORAL EVERY 12 HOURS PRN
Qty: 10 TABLET | Refills: 0 | Status: ON HOLD | OUTPATIENT
Start: 2022-02-09 | End: 2022-02-12 | Stop reason: HOSPADM

## 2022-02-09 RX ORDER — NITROFURANTOIN 25; 75 MG/1; MG/1
100 CAPSULE ORAL 2 TIMES DAILY
Qty: 14 CAPSULE | Refills: 0 | Status: ON HOLD | OUTPATIENT
Start: 2022-02-09 | End: 2022-02-12 | Stop reason: HOSPADM

## 2022-02-09 RX ADMIN — SODIUM CHLORIDE 1000 ML: 0.9 INJECTION, SOLUTION INTRAVENOUS at 11:02

## 2022-02-09 NOTE — PROGRESS NOTES
"Subjective:       Patient ID: Anayeli Judd is a 77 y.o. female.    Chief Complaint: Abdominal pain, Dysuria, SBO hx (Pre-op evaluation)    HPI:  Patient here with family/caregiver. Hx of partial sbo and multiple ER visits for abdominal pain and constipation. Saw Surgery this week and planning for surgery with TIFFANY next month. Presents today acutely for another episode of generalized abdominal pain. Last bm yesterday, but notes it was small. Has been on Miralax qpm and daily Metamucil. Still tolerating po though some episodes of mild nausea.  Notes dysuria recently. Hx of urinary incontinence followed by Urology. Recently stopped Myrbetriq since not effective. UTI treated last month with culture positive for sensitive E coli.  T2DM, metformin 500 TID stopped 2 months ago to see if would help GI issues. Morning sugars generally 150 to 200's. PM bg 200 to 300's recently.    Review of Systems   Constitutional: Positive for fatigue. Negative for fever.   Respiratory: Negative for cough and shortness of breath.    Gastrointestinal: Positive for abdominal pain, constipation and nausea. Negative for abdominal distention, blood in stool, diarrhea and vomiting.   Genitourinary: Positive for dysuria and urgency.   Musculoskeletal: Positive for back pain.   Skin: Positive for rash (Left upper chest "shingles flair"). Negative for wound.       Past Medical History:   Diagnosis Date    Abdominal pain     Allergic rhinitis     Arthritis     Blood platelet disorder     Blood platelet disorder     Blood transfusion     during delivery and     Bowel obstruction     Cervical radiculopathy     followed by dr cloud    Colon cancer     transverse colon; resected; Stage IIA (pT3 pN0 MX)    Diabetes mellitus     Diarrhea     Falls 2020    Family history of breast cancer     Family history of colon cancer     Fatty liver     GERD (gastroesophageal reflux disease)     History of shingles     " Hyperlipidemia     Hypothyroidism     Irritable bowel syndrome     Microscopic colitis     treated 2013    Raynaud phenomenon     Raynaud's disease     Type 2 diabetes mellitus          Current Outpatient Medications:     ascorbic Acid (VITAMIN C) 500 mg CpSR, Take 500 mg by mouth once daily. , Disp: , Rfl:     atorvastatin (LIPITOR) 40 MG tablet, TAKE 1 TABLET BY MOUTH  DAILY, Disp: 90 tablet, Rfl: 3    azelastine (ASTELIN) 137 mcg (0.1 %) nasal spray, 2 sprays (274 mcg total) by Nasal route 2 (two) times daily., Disp: 30 mL, Rfl: 1    biotin 5,000 mcg TbDL, Take 1 tablet by mouth once daily. , Disp: , Rfl:     butalbital-acetaminophen-caffeine -40 mg (FIORICET, ESGIC) -40 mg per tablet, SMARTSI Tablet(s) By Mouth 1 to 3 Times Daily PRN, Disp: , Rfl:     cholestyramine-aspartame (CHOLESTYRAMINE LIGHT) 4 gram PwPk, Take 1 packet (4 g total) by mouth daily as needed (Diarrhea)., Disp: 90 packet, Rfl: 0    clotrimazole-betamethasone 1-0.05% (LOTRISONE) cream, Apply topically 2 (two) times daily., Disp: , Rfl:     co-enzyme Q-10 30 mg capsule, Take 30 mg by mouth 3 (three) times daily., Disp: , Rfl:     conjugated estrogens (PREMARIN) vaginal cream, INSERT 1/2 GRAM VAGINALLY  TWICE WEEKLY, Disp: 30 g, Rfl: 3    cranberry extract 200 mg Cap, 1 capsule DAILY (route: oral), Disp: , Rfl:     D-MANNOSE ORAL, Take 1 tablet by mouth once daily. , Disp: , Rfl:     DULoxetine (CYMBALTA) 30 MG capsule, Take 1 capsule (30 mg total) by mouth 2 (two) times daily., Disp: 180 capsule, Rfl: 3    flash glucose sensor (FREESTYLE EFREN 14 DAY SENSOR) Kit, 1 application by Misc.(Non-Drug; Combo Route) route every 14 (fourteen) days. Disp 30 or 90 day refill, Disp: 6 kit, Rfl: 6    FREESTYLE EFREN 10 DAY READER Misc, TEST as directed, Disp: 3 each, Rfl: 3    FREESTYLE LITE STRIPS Strp, TEST DAILY AS DIRECTED, Disp: 50 strip, Rfl: 2    hydrocortisone 2.5 % cream, Apply topically 2 (two) times daily as  needed., Disp: 1 each, Rfl: 1    hyoscyamine (ANASPAZ,LEVSIN) 0.125 mg Tab, Take 1 tablet (125 mcg total) by mouth every 8 (eight) hours as needed (urgency)., Disp: 90 tablet, Rfl: 3    Lactobacillus rhamnosus GG (CULTURELLE) 10 billion cell capsule, Take 1 capsule by mouth once daily., Disp: 30 capsule, Rfl: 0    levothyroxine (SYNTHROID) 75 MCG tablet, Take 1 tablet (75 mcg total) by mouth before breakfast., Disp: 90 tablet, Rfl: 3    LIDOcaine (LIDODERM) 5 %, Place 1 patch onto the skin once daily. Remove & Discard patch within 12 hours or as directed by MD, Disp: 30 patch, Rfl: 0    magnesium 30 mg Tab, Take 500 capsules by mouth. , Disp: , Rfl:     montelukast (SINGULAIR) 10 mg tablet, TAKE 1 TABLET BY MOUTH  DAILY, Disp: 90 tablet, Rfl: 3    omega 3-dha-epa-fish oil 1,200 (144-216) mg Cap, Take 1 capsule by mouth once daily. , Disp: , Rfl:     ondansetron (ZOFRAN-ODT) 8 MG TbDL, Take 1 tablet (8 mg total) by mouth every 8 (eight) hours as needed (nausea)., Disp: 30 tablet, Rfl: 0    pantoprazole (PROTONIX) 20 MG tablet, Take 1 tablet (20 mg total) by mouth once daily., Disp: 30 tablet, Rfl: 2    polyethylene glycol (GLYCOLAX) 17 gram/dose powder, Take 17 g by mouth before dinner., Disp: 1530 g, Rfl: 1    promethazine-codeine 6.25-10 mg/5 ml (PHENERGAN WITH CODEINE) 6.25-10 mg/5 mL syrup, Take 5 mLs by mouth every 4 to 6 hours as needed for Cough., Disp: 240 mL, Rfl: 0    psyllium husk, aspartame, (METAMUCIL) 3.4 gram PwPk packet, Take 1 packet by mouth 2 (two) times daily., Disp: 60 packet, Rfl: 0    senna-docusate 8.6-50 mg (PERICOLACE) 8.6-50 mg per tablet, Take 1 tablet by mouth 2 (two) times daily., Disp: 60 tablet, Rfl: 2    simethicone (MYLICON) 80 MG chewable tablet, Take 1 tablet (80 mg total) by mouth every 6 (six) hours as needed for Flatulence., Disp: 30 tablet, Rfl: 0    valACYclovir (VALTREX) 500 MG tablet, Take 1 tablet (500 mg total) by mouth 3 (three) times daily., Disp: 30  tablet, Rfl: 0    vitamin D 1000 units Tab, Take 185 mg by mouth once daily. D3, Disp: , Rfl:     dicyclomine (BENTYL) 20 mg tablet, Take 1 tablet (20 mg total) by mouth 2 (two) times daily. for 15 days (Patient not taking: Reported on 2022), Disp: 30 tablet, Rfl: 0    HYDROcodone-acetaminophen (NORCO) 5-325 mg per tablet, Take 1 tablet by mouth every 12 (twelve) hours as needed for Pain., Disp: 10 tablet, Rfl: 0    lactulose (CHRONULAC) 10 gram/15 mL solution, Take 30 mLs (20 g total) by mouth once daily., Disp: 473 mL, Rfl: 5    nitrofurantoin, macrocrystal-monohydrate, (MACROBID) 100 MG capsule, Take 1 capsule (100 mg total) by mouth 2 (two) times daily. for 7 days, Disp: 14 capsule, Rfl: 0    Past Surgical History:   Procedure Laterality Date    APPENDECTOMY      BACK SURGERY      CARPAL TUNNEL RELEASE      bilateral      SECTION      CHOLECYSTECTOMY  1965    COLECTOMY  2011    Transverse colon resection by Dr. Aguirre    COLONOSCOPY N/A 2017    Procedure: COLONOSCOPY;  Surgeon: Manjit Alvarez MD;  Location: [facility] (4TH FLR);  Service: Endoscopy;  Laterality: N/A;    COLONOSCOPY N/A 2019    Procedure: COLONOSCOPY;  Surgeon: Ramiro Jefferson MD;  Location: [facility] (4TH FLR);  Service: Endoscopy;  Laterality: N/A;  PM prep    CYSTOSCOPY N/A 2021    Procedure: CYSTOSCOPY;  Surgeon: Viridiana Valenzuela MD;  [address]  Service: Urology;  Laterality: N/A;    ENDOSCOPIC ULTRASOUND OF UPPER GASTROINTESTINAL TRACT N/A 2018    Procedure: ULTRASOUND, UPPER GI TRACT, ENDOSCOPIC;  Surgeon: Jose Hess MD;  Location: [facility];  Service: Endoscopy;  Laterality: N/A;    ENDOSCOPIC ULTRASOUND OF UPPER GASTROINTESTINAL TRACT N/A 2019    Procedure: ULTRASOUND, UPPER GI TRACT, ENDOSCOPIC;  Surgeon: Jose Hess MD;  Location: [facility] (2ND FLR);  Service: Endoscopy;  Laterality: N/A;    ESOPHAGOGASTRODUODENOSCOPY N/A 2018     Procedure: EGD (ESOPHAGOGASTRODUODENOSCOPY);  Surgeon: Angelo Reynolds MD;  Location: [facility] ENDO (2ND FLR);  Service: Endoscopy;  Laterality: N/A;    ESOPHAGOGASTRODUODENOSCOPY N/A 6/26/2019    Procedure: EGD (ESOPHAGOGASTRODUODENOSCOPY);  Surgeon: Ramiro Jefferson MD;  Location: [facility] ENDO (4TH FLR);  Service: Endoscopy;  Laterality: N/A;    EYE SURGERY      Cataract Removal    FLUOROSCOPIC URODYNAMIC STUDY N/A 11/9/2021    Procedure: URODYNAMIC STUDY, FLUOROSCOPIC;  Surgeon: Viridiana Valenzuela MD;  Location: [facility] OR 1ST FLR;  Service: Urology;  Laterality: N/A;  90 minutes     HYSTERECTOMY      posterolateral fusion with autograft bone and Hamilton mineralized bone matrix  2/1/13    at [facility] for lumbar spine stenosis    TOE SURGERY      TONSILLECTOMY      TRIGGER FINGER RELEASE         Social History     Tobacco Use    Smoking status: Never Smoker    Smokeless tobacco: Never Used   Substance Use Topics    Alcohol use: Yes     Comment: socially, hardly ever    Drug use: No         Objective:      Vitals:    02/09/22 1244   BP: 111/70   Pulse: 80   Temp: 98.7 °F (37.1 °C)       Physical Exam  Constitutional:       Appearance: She is not toxic-appearing or diaphoretic.      Comments: Fatigued   Eyes:      Extraocular Movements: Extraocular movements intact.      Conjunctiva/sclera: Conjunctivae normal.   Cardiovascular:      Rate and Rhythm: Normal rate and regular rhythm.   Pulmonary:      Effort: Pulmonary effort is normal. No respiratory distress.      Breath sounds: Normal breath sounds. No wheezing, rhonchi or rales.   Abdominal:      General: Abdomen is flat. Bowel sounds are normal. There is no distension.      Palpations: Abdomen is soft. There is no mass.      Tenderness: There is abdominal tenderness (Moderate, diffuse, including suprapubic). There is no right CVA tenderness, left CVA tenderness, guarding or rebound.      Hernia: No hernia is present.   Musculoskeletal:      Right lower leg: No  edema.      Left lower leg: No edema.   Skin:     Findings: Rash (Area of resolving rash with hyperpigmentation over left upper chest to lower neck) present. No bruising.   Neurological:      General: No focal deficit present.      Mental Status: She is alert and oriented to person, place, and time.   Psychiatric:         Mood and Affect: Mood normal.         Behavior: Behavior normal.         Thought Content: Thought content normal.         Judgment: Judgment normal.          Recent Results (from the past 336 hour(s))   Urinalysis, Reflex to Urine Culture Urine, Clean Catch    Collection Time: 02/09/22  1:24 PM    Specimen: Urine   Result Value Ref Range    Specimen UA Urine, Clean Catch     Color, UA Yellow Yellow, Straw, Vangie    Appearance, UA Cloudy (A) Clear    pH, UA 7.0 5.0 - 8.0    Specific Gravity, UA 1.010 1.005 - 1.030    Protein, UA 2+ (A) Negative    Glucose, UA Negative Negative    Ketones, UA Negative Negative    Bilirubin (UA) Negative Negative    Occult Blood UA 2+ (A) Negative    Nitrite, UA Negative Negative    Leukocytes, UA 3+ (A) Negative   Urinalysis Microscopic    Collection Time: 02/09/22  1:24 PM   Result Value Ref Range    RBC, UA 10 (H) 0 - 4 /hpf    WBC, UA >100 (H) 0 - 5 /hpf    WBC Clumps, UA Rare None-Rare    Bacteria Many (A) None-Occ /hpf    Yeast, UA None None    Hyaline Casts, UA 0 0-1/lpf /lpf    Microscopic Comment SEE COMMENT    POCT Urinalysis, Dipstick, Automated, W/O Scope    Collection Time: 02/09/22  1:32 PM   Result Value Ref Range    POC Blood, Urine Positive (A) Negative    POC Bilirubin, Urine Negative Negative    POC Urobilinogen, Urine normal 0.1 - 1.1    POC Ketones, Urine Negative Negative    POC Protein, Urine Positive (A) Negative    POC Nitrates, Urine Negative Negative    POC Glucose, Urine Negative Negative    pH, UA 7     POC Specific Gravity, Urine 1.010 1.003 - 1.029    POC Leukocytes, Urine Positive (A) Negative         Assessment/Plan:     1) UTI   -  Urine culture last month in hospital grew sensitive Enterococcus. Pt notes completed course of Amoxicillin at home.  - Start Macrobid x7 days which should cover for typical GNR's and Enterococcus. Culture sent.    2) Abdominal pains, Constipation, SBO hx  - Mild diffuse tenderness without distension, BS normal, tolerating po. Had small BM yesterday. Seen by Surgery this week, planning on surgery with TIFFANY early next month.  - Pt and family have noted good results with pain control followed by oral contrast in ER previously. [place] 10 mg given in office. Will try Mag citrate 296 mL today. Start lactulose 20 g qam tomorrow. Okay to continue current afternoon Miralax as well, unless bm's become frequent. ER if worsening or persistent abdominal pain with lack of bm this evening.    3) T2DM - Stopped Metformin 500 TID over a month ago. Morning bg typically 160 to 200's. Afternoon 200 to 300's. Noted no improvement in GI issues after stopping Metformin. Will get current bowel flair under control, then likely restart Metformin at 500 mg qd.    4) Shingles flair, left upper chest/neck? - Issue in past. Started Valtrex and steroid cream recently with resolving rash. No blisters or crusting noted today. Complete current course of Valtrex, but advised to send me a picture of rash during next episode to better confirm diagnosis.    Regarding upcoming abdominal surgery, this is currently planned for 3/4/22. Working on diabetes control, but expect this to be easily managed with insulin sam-operatively. Have contacted her PCP, no issues or concerns that would hold up proceeding with surgery, particularly since her GI issues such a disabling factor at this point. Okay to proceed with surgery as planned. I will plan to see her again prior and will update Surgery if any concerning issues or changes.

## 2022-02-10 PROBLEM — F41.9 ANXIETY: Chronic | Status: ACTIVE | Noted: 2022-02-10

## 2022-02-10 PROBLEM — N39.0 UTI (URINARY TRACT INFECTION): Status: ACTIVE | Noted: 2022-02-10

## 2022-02-10 LAB
ALBUMIN SERPL BCP-MCNC: 3.2 G/DL (ref 3.5–5.2)
ALBUMIN SERPL BCP-MCNC: 4 G/DL (ref 3.5–5.2)
ALP SERPL-CCNC: 117 U/L (ref 55–135)
ALP SERPL-CCNC: 96 U/L (ref 55–135)
ALT SERPL W/O P-5'-P-CCNC: 110 U/L (ref 10–44)
ALT SERPL W/O P-5'-P-CCNC: 82 U/L (ref 10–44)
ANION GAP SERPL CALC-SCNC: 6 MMOL/L (ref 8–16)
ANION GAP SERPL CALC-SCNC: 8 MMOL/L (ref 8–16)
AST SERPL-CCNC: 107 U/L (ref 10–40)
AST SERPL-CCNC: 167 U/L (ref 10–40)
BASOPHILS # BLD AUTO: 0.03 K/UL (ref 0–0.2)
BASOPHILS # BLD AUTO: 0.03 K/UL (ref 0–0.2)
BASOPHILS NFR BLD: 0.3 % (ref 0–1.9)
BASOPHILS NFR BLD: 0.5 % (ref 0–1.9)
BILIRUB SERPL-MCNC: 1.8 MG/DL (ref 0.1–1)
BILIRUB SERPL-MCNC: 2.8 MG/DL (ref 0.1–1)
BUN SERPL-MCNC: 13 MG/DL (ref 8–23)
BUN SERPL-MCNC: 14 MG/DL (ref 8–23)
CALCIUM SERPL-MCNC: 10.3 MG/DL (ref 8.7–10.5)
CALCIUM SERPL-MCNC: 9 MG/DL (ref 8.7–10.5)
CHLORIDE SERPL-SCNC: 98 MMOL/L (ref 95–110)
CHLORIDE SERPL-SCNC: 99 MMOL/L (ref 95–110)
CO2 SERPL-SCNC: 25 MMOL/L (ref 23–29)
CO2 SERPL-SCNC: 27 MMOL/L (ref 23–29)
CREAT SERPL-MCNC: 0.8 MG/DL (ref 0.5–1.4)
CREAT SERPL-MCNC: 0.9 MG/DL (ref 0.5–1.4)
CTP QC/QA: YES
DIFFERENTIAL METHOD: ABNORMAL
DIFFERENTIAL METHOD: ABNORMAL
EOSINOPHIL # BLD AUTO: 0.1 K/UL (ref 0–0.5)
EOSINOPHIL # BLD AUTO: 0.2 K/UL (ref 0–0.5)
EOSINOPHIL NFR BLD: 1.4 % (ref 0–8)
EOSINOPHIL NFR BLD: 2.5 % (ref 0–8)
ERYTHROCYTE [DISTWIDTH] IN BLOOD BY AUTOMATED COUNT: 13.5 % (ref 11.5–14.5)
ERYTHROCYTE [DISTWIDTH] IN BLOOD BY AUTOMATED COUNT: 13.5 % (ref 11.5–14.5)
EST. GFR  (AFRICAN AMERICAN): >60 ML/MIN/1.73 M^2
EST. GFR  (AFRICAN AMERICAN): >60 ML/MIN/1.73 M^2
EST. GFR  (NON AFRICAN AMERICAN): >60 ML/MIN/1.73 M^2
EST. GFR  (NON AFRICAN AMERICAN): >60 ML/MIN/1.73 M^2
GLUCOSE FINGERSTICK: 158
GLUCOSE SERPL-MCNC: 199 MG/DL (ref 70–110)
GLUCOSE SERPL-MCNC: 221 MG/DL (ref 70–110)
GRAM STN SPEC: NORMAL
HCT VFR BLD AUTO: 33.7 % (ref 37–48.5)
HCT VFR BLD AUTO: 38.8 % (ref 37–48.5)
HGB BLD-MCNC: 11 G/DL (ref 12–16)
HGB BLD-MCNC: 12.6 G/DL (ref 12–16)
IMM GRANULOCYTES # BLD AUTO: 0.01 K/UL (ref 0–0.04)
IMM GRANULOCYTES # BLD AUTO: 0.02 K/UL (ref 0–0.04)
IMM GRANULOCYTES NFR BLD AUTO: 0.2 % (ref 0–0.5)
IMM GRANULOCYTES NFR BLD AUTO: 0.2 % (ref 0–0.5)
INR PPP: 1.1 (ref 0.8–1.2)
LIPASE SERPL-CCNC: 7 U/L (ref 4–60)
LYMPHOCYTES # BLD AUTO: 0.9 K/UL (ref 1–4.8)
LYMPHOCYTES # BLD AUTO: 1 K/UL (ref 1–4.8)
LYMPHOCYTES NFR BLD: 17.2 % (ref 18–48)
LYMPHOCYTES NFR BLD: 9.9 % (ref 18–48)
MCH RBC QN AUTO: 32.4 PG (ref 27–31)
MCH RBC QN AUTO: 32.6 PG (ref 27–31)
MCHC RBC AUTO-ENTMCNC: 32.5 G/DL (ref 32–36)
MCHC RBC AUTO-ENTMCNC: 32.6 G/DL (ref 32–36)
MCV RBC AUTO: 100 FL (ref 82–98)
MCV RBC AUTO: 99 FL (ref 82–98)
MONOCYTES # BLD AUTO: 0.6 K/UL (ref 0.3–1)
MONOCYTES # BLD AUTO: 0.7 K/UL (ref 0.3–1)
MONOCYTES NFR BLD: 10.1 % (ref 4–15)
MONOCYTES NFR BLD: 8 % (ref 4–15)
NEUTROPHILS # BLD AUTO: 4.2 K/UL (ref 1.8–7.7)
NEUTROPHILS # BLD AUTO: 7.2 K/UL (ref 1.8–7.7)
NEUTROPHILS NFR BLD: 69.5 % (ref 38–73)
NEUTROPHILS NFR BLD: 80.2 % (ref 38–73)
NRBC BLD-RTO: 0 /100 WBC
NRBC BLD-RTO: 0 /100 WBC
PLATELET # BLD AUTO: 100 K/UL (ref 150–450)
PLATELET # BLD AUTO: 91 K/UL (ref 150–450)
PMV BLD AUTO: 10.5 FL (ref 9.2–12.9)
PMV BLD AUTO: 10.8 FL (ref 9.2–12.9)
POCT GLUCOSE: 153 MG/DL (ref 70–110)
POCT GLUCOSE: 170 MG/DL (ref 70–110)
POCT GLUCOSE: 191 MG/DL (ref 70–110)
POTASSIUM SERPL-SCNC: 4.3 MMOL/L (ref 3.5–5.1)
POTASSIUM SERPL-SCNC: 4.5 MMOL/L (ref 3.5–5.1)
PROT SERPL-MCNC: 6.5 G/DL (ref 6–8.4)
PROT SERPL-MCNC: 7.9 G/DL (ref 6–8.4)
PROTHROMBIN TIME: 11.5 SEC (ref 9–12.5)
RBC # BLD AUTO: 3.39 M/UL (ref 4–5.4)
RBC # BLD AUTO: 3.87 M/UL (ref 4–5.4)
SARS-COV-2 RDRP RESP QL NAA+PROBE: NEGATIVE
SODIUM SERPL-SCNC: 131 MMOL/L (ref 136–145)
SODIUM SERPL-SCNC: 132 MMOL/L (ref 136–145)
WBC # BLD AUTO: 6.03 K/UL (ref 3.9–12.7)
WBC # BLD AUTO: 8.97 K/UL (ref 3.9–12.7)

## 2022-02-10 PROCEDURE — 63600175 PHARM REV CODE 636 W HCPCS: Performed by: STUDENT IN AN ORGANIZED HEALTH CARE EDUCATION/TRAINING PROGRAM

## 2022-02-10 PROCEDURE — 80053 COMPREHEN METABOLIC PANEL: CPT

## 2022-02-10 PROCEDURE — 20600001 HC STEP DOWN PRIVATE ROOM

## 2022-02-10 PROCEDURE — 36415 COLL VENOUS BLD VENIPUNCTURE: CPT | Performed by: STUDENT IN AN ORGANIZED HEALTH CARE EDUCATION/TRAINING PROGRAM

## 2022-02-10 PROCEDURE — 63600175 PHARM REV CODE 636 W HCPCS

## 2022-02-10 PROCEDURE — 97161 PT EVAL LOW COMPLEX 20 MIN: CPT

## 2022-02-10 PROCEDURE — 25500020 PHARM REV CODE 255: Performed by: EMERGENCY MEDICINE

## 2022-02-10 PROCEDURE — 85025 COMPLETE CBC W/AUTO DIFF WBC: CPT

## 2022-02-10 PROCEDURE — 96374 THER/PROPH/DIAG INJ IV PUSH: CPT

## 2022-02-10 PROCEDURE — 94761 N-INVAS EAR/PLS OXIMETRY MLT: CPT

## 2022-02-10 PROCEDURE — 87040 BLOOD CULTURE FOR BACTERIA: CPT | Mod: 59 | Performed by: STUDENT IN AN ORGANIZED HEALTH CARE EDUCATION/TRAINING PROGRAM

## 2022-02-10 PROCEDURE — 96375 TX/PRO/DX INJ NEW DRUG ADDON: CPT

## 2022-02-10 PROCEDURE — 63600175 PHARM REV CODE 636 W HCPCS: Performed by: EMERGENCY MEDICINE

## 2022-02-10 PROCEDURE — 25000003 PHARM REV CODE 250: Performed by: STUDENT IN AN ORGANIZED HEALTH CARE EDUCATION/TRAINING PROGRAM

## 2022-02-10 PROCEDURE — 25000003 PHARM REV CODE 250

## 2022-02-10 PROCEDURE — 97530 THERAPEUTIC ACTIVITIES: CPT

## 2022-02-10 PROCEDURE — 85610 PROTHROMBIN TIME: CPT | Performed by: STUDENT IN AN ORGANIZED HEALTH CARE EDUCATION/TRAINING PROGRAM

## 2022-02-10 RX ORDER — GLUCAGON 1 MG
1 KIT INJECTION
Status: DISCONTINUED | OUTPATIENT
Start: 2022-02-10 | End: 2022-02-11

## 2022-02-10 RX ORDER — DEXTROMETHORPHAN HYDROBROMIDE, GUAIFENESIN 5; 100 MG/5ML; MG/5ML
1300 LIQUID ORAL 2 TIMES DAILY
COMMUNITY

## 2022-02-10 RX ORDER — MONTELUKAST SODIUM 10 MG/1
10 TABLET ORAL DAILY
Status: DISCONTINUED | OUTPATIENT
Start: 2022-02-10 | End: 2022-02-12 | Stop reason: HOSPADM

## 2022-02-10 RX ORDER — ASCORBIC ACID 250 MG
500 TABLET ORAL DAILY
Status: DISCONTINUED | OUTPATIENT
Start: 2022-02-10 | End: 2022-02-12 | Stop reason: HOSPADM

## 2022-02-10 RX ORDER — SODIUM CHLORIDE, SODIUM LACTATE, POTASSIUM CHLORIDE, CALCIUM CHLORIDE 600; 310; 30; 20 MG/100ML; MG/100ML; MG/100ML; MG/100ML
INJECTION, SOLUTION INTRAVENOUS CONTINUOUS
Status: DISCONTINUED | OUTPATIENT
Start: 2022-02-11 | End: 2022-02-10

## 2022-02-10 RX ORDER — INSULIN ASPART 100 [IU]/ML
0-5 INJECTION, SOLUTION INTRAVENOUS; SUBCUTANEOUS EVERY 6 HOURS PRN
Status: DISCONTINUED | OUTPATIENT
Start: 2022-02-10 | End: 2022-02-11

## 2022-02-10 RX ORDER — ONDANSETRON 2 MG/ML
4 INJECTION INTRAMUSCULAR; INTRAVENOUS EVERY 8 HOURS PRN
Status: DISCONTINUED | OUTPATIENT
Start: 2022-02-10 | End: 2022-02-12 | Stop reason: HOSPADM

## 2022-02-10 RX ORDER — TALC
6 POWDER (GRAM) TOPICAL NIGHTLY PRN
Status: CANCELLED | OUTPATIENT
Start: 2022-02-10

## 2022-02-10 RX ORDER — MORPHINE SULFATE 2 MG/ML
2 INJECTION, SOLUTION INTRAMUSCULAR; INTRAVENOUS EVERY 6 HOURS PRN
Status: DISCONTINUED | OUTPATIENT
Start: 2022-02-10 | End: 2022-02-11

## 2022-02-10 RX ORDER — ONDANSETRON 2 MG/ML
4 INJECTION INTRAMUSCULAR; INTRAVENOUS
Status: COMPLETED | OUTPATIENT
Start: 2022-02-10 | End: 2022-02-10

## 2022-02-10 RX ORDER — ENOXAPARIN SODIUM 100 MG/ML
40 INJECTION SUBCUTANEOUS EVERY 24 HOURS
Status: DISCONTINUED | OUTPATIENT
Start: 2022-02-10 | End: 2022-02-11

## 2022-02-10 RX ORDER — LEVOTHYROXINE SODIUM 75 UG/1
75 TABLET ORAL
Status: DISCONTINUED | OUTPATIENT
Start: 2022-02-10 | End: 2022-02-12 | Stop reason: HOSPADM

## 2022-02-10 RX ORDER — DULOXETIN HYDROCHLORIDE 30 MG/1
30 CAPSULE, DELAYED RELEASE ORAL 2 TIMES DAILY
Status: DISCONTINUED | OUTPATIENT
Start: 2022-02-10 | End: 2022-02-12 | Stop reason: HOSPADM

## 2022-02-10 RX ORDER — SODIUM CHLORIDE, SODIUM LACTATE, POTASSIUM CHLORIDE, CALCIUM CHLORIDE 600; 310; 30; 20 MG/100ML; MG/100ML; MG/100ML; MG/100ML
INJECTION, SOLUTION INTRAVENOUS CONTINUOUS
Status: ACTIVE | OUTPATIENT
Start: 2022-02-10 | End: 2022-02-10

## 2022-02-10 RX ORDER — MORPHINE SULFATE 4 MG/ML
4 INJECTION, SOLUTION INTRAMUSCULAR; INTRAVENOUS
Status: COMPLETED | OUTPATIENT
Start: 2022-02-10 | End: 2022-02-10

## 2022-02-10 RX ORDER — LACTULOSE 10 G/15ML
20 SOLUTION ORAL DAILY
Refills: 5 | Status: DISCONTINUED | OUTPATIENT
Start: 2022-02-10 | End: 2022-02-10

## 2022-02-10 RX ORDER — NITROFURANTOIN 25; 75 MG/1; MG/1
100 CAPSULE ORAL 2 TIMES DAILY
Status: DISCONTINUED | OUTPATIENT
Start: 2022-02-10 | End: 2022-02-10

## 2022-02-10 RX ORDER — ATORVASTATIN CALCIUM 20 MG/1
40 TABLET, FILM COATED ORAL DAILY
Status: DISCONTINUED | OUTPATIENT
Start: 2022-02-10 | End: 2022-02-12 | Stop reason: HOSPADM

## 2022-02-10 RX ORDER — MIRABEGRON 50 MG/1
1 TABLET, FILM COATED, EXTENDED RELEASE ORAL DAILY
COMMUNITY
End: 2022-02-17

## 2022-02-10 RX ORDER — SODIUM CHLORIDE 0.9 % (FLUSH) 0.9 %
10 SYRINGE (ML) INJECTION
Status: DISCONTINUED | OUTPATIENT
Start: 2022-02-10 | End: 2022-02-12 | Stop reason: HOSPADM

## 2022-02-10 RX ORDER — FLUTICASONE PROPIONATE 50 MCG
1 SPRAY, SUSPENSION (ML) NASAL DAILY PRN
COMMUNITY
End: 2022-10-27

## 2022-02-10 RX ORDER — TALC
6 POWDER (GRAM) TOPICAL NIGHTLY PRN
Status: DISCONTINUED | OUTPATIENT
Start: 2022-02-10 | End: 2022-02-12 | Stop reason: HOSPADM

## 2022-02-10 RX ORDER — ACETAMINOPHEN 325 MG/1
650 TABLET ORAL EVERY 4 HOURS PRN
Status: DISCONTINUED | OUTPATIENT
Start: 2022-02-10 | End: 2022-02-12 | Stop reason: HOSPADM

## 2022-02-10 RX ORDER — LIDOCAINE 50 MG/G
1 PATCH TOPICAL DAILY
Status: DISCONTINUED | OUTPATIENT
Start: 2022-02-10 | End: 2022-02-12 | Stop reason: HOSPADM

## 2022-02-10 RX ORDER — SODIUM CHLORIDE 0.9 % (FLUSH) 0.9 %
10 SYRINGE (ML) INJECTION
Status: CANCELLED | OUTPATIENT
Start: 2022-02-10

## 2022-02-10 RX ORDER — NALOXONE HCL 0.4 MG/ML
0.2 VIAL (ML) INJECTION
Status: DISCONTINUED | OUTPATIENT
Start: 2022-02-10 | End: 2022-02-12 | Stop reason: HOSPADM

## 2022-02-10 RX ADMIN — DULOXETINE 30 MG: 30 CAPSULE, DELAYED RELEASE ORAL at 08:02

## 2022-02-10 RX ADMIN — MONTELUKAST 10 MG: 10 TABLET, FILM COATED ORAL at 08:02

## 2022-02-10 RX ADMIN — SODIUM CHLORIDE, SODIUM LACTATE, POTASSIUM CHLORIDE, AND CALCIUM CHLORIDE 500 ML: .6; .31; .03; .02 INJECTION, SOLUTION INTRAVENOUS at 11:02

## 2022-02-10 RX ADMIN — PIPERACILLIN AND TAZOBACTAM 4.5 G: 4; .5 INJECTION, POWDER, LYOPHILIZED, FOR SOLUTION INTRAVENOUS; PARENTERAL at 11:02

## 2022-02-10 RX ADMIN — SODIUM CHLORIDE, SODIUM LACTATE, POTASSIUM CHLORIDE, AND CALCIUM CHLORIDE: .6; .31; .03; .02 INJECTION, SOLUTION INTRAVENOUS at 04:02

## 2022-02-10 RX ADMIN — MORPHINE SULFATE 4 MG: 4 INJECTION INTRAVENOUS at 12:02

## 2022-02-10 RX ADMIN — SODIUM CHLORIDE, SODIUM LACTATE, POTASSIUM CHLORIDE, AND CALCIUM CHLORIDE: .6; .31; .03; .02 INJECTION, SOLUTION INTRAVENOUS at 11:02

## 2022-02-10 RX ADMIN — PIPERACILLIN AND TAZOBACTAM 4.5 G: 4; .5 INJECTION, POWDER, LYOPHILIZED, FOR SOLUTION INTRAVENOUS; PARENTERAL at 06:02

## 2022-02-10 RX ADMIN — CEFTRIAXONE 1 G: 1 INJECTION, SOLUTION INTRAVENOUS at 08:02

## 2022-02-10 RX ADMIN — LEVOTHYROXINE SODIUM 75 MCG: 75 TABLET ORAL at 06:02

## 2022-02-10 RX ADMIN — ENOXAPARIN SODIUM 40 MG: 100 INJECTION SUBCUTANEOUS at 05:02

## 2022-02-10 RX ADMIN — ONDANSETRON 4 MG: 2 INJECTION INTRAMUSCULAR; INTRAVENOUS at 12:02

## 2022-02-10 RX ADMIN — IOHEXOL 100 ML: 350 INJECTION, SOLUTION INTRAVENOUS at 12:02

## 2022-02-10 RX ADMIN — ATORVASTATIN CALCIUM 40 MG: 20 TABLET, FILM COATED ORAL at 08:02

## 2022-02-10 RX ADMIN — Medication 500 MG: at 08:02

## 2022-02-10 NOTE — SUBJECTIVE & OBJECTIVE
Past Medical History:   Diagnosis Date    Abdominal pain     Allergic rhinitis     Arthritis     Blood platelet disorder     Blood platelet disorder     Blood transfusion     during delivery and     Bowel obstruction     Cervical radiculopathy     followed by dr cloud    Colon cancer     transverse colon; resected; Stage IIA (pT3 pN0 MX)    Diabetes mellitus     Diarrhea     Falls 2020    Family history of breast cancer     Family history of colon cancer     Fatty liver     GERD (gastroesophageal reflux disease)     History of shingles     Hyperlipidemia     Hypothyroidism     Irritable bowel syndrome     Microscopic colitis     treated     Raynaud phenomenon     Raynaud's disease     Type 2 diabetes mellitus        Past Surgical History:   Procedure Laterality Date    APPENDECTOMY      BACK SURGERY      CARPAL TUNNEL RELEASE      bilateral      SECTION      CHOLECYSTECTOMY  1965    COLECTOMY  2011    Transverse colon resection by Dr. Aguirre    COLONOSCOPY N/A 2017    Procedure: COLONOSCOPY;  Surgeon: Manjit Alvarez MD;  Location: [facility] (4TH FLR);  Service: Endoscopy;  Laterality: N/A;    COLONOSCOPY N/A 2019    Procedure: COLONOSCOPY;  Surgeon: Ramiro Jefferson MD;  Location: [facility] (4TH FLR);  Service: Endoscopy;  Laterality: N/A;  PM prep    CYSTOSCOPY N/A 2021    Procedure: CYSTOSCOPY;  Surgeon: Viridiana Valenzuela MD;  [address]  Service: Urology;  Laterality: N/A;    ENDOSCOPIC ULTRASOUND OF UPPER GASTROINTESTINAL TRACT N/A 2018    Procedure: ULTRASOUND, UPPER GI TRACT, ENDOSCOPIC;  Surgeon: Jose Hess MD;  Location: [facility];  Service: Endoscopy;  Laterality: N/A;    ENDOSCOPIC ULTRASOUND OF UPPER GASTROINTESTINAL TRACT N/A 2019    Procedure: ULTRASOUND, UPPER GI TRACT, ENDOSCOPIC;  Surgeon: Jose Hess MD;  Location: [facility] (2ND FLR);  Service: Endoscopy;  Laterality: N/A;     ESOPHAGOGASTRODUODENOSCOPY N/A 2018    Procedure: EGD (ESOPHAGOGASTRODUODENOSCOPY);  Surgeon: Angelo Reynolds MD;  Location: [facility] ENDO (2ND FLR);  Service: Endoscopy;  Laterality: N/A;    ESOPHAGOGASTRODUODENOSCOPY N/A 2019    Procedure: EGD (ESOPHAGOGASTRODUODENOSCOPY);  Surgeon: Ramiro Jefferson MD;  Location: [facility] ENDO (4TH FLR);  Service: Endoscopy;  Laterality: N/A;    EYE SURGERY      Cataract Removal    FLUOROSCOPIC URODYNAMIC STUDY N/A 2021    Procedure: URODYNAMIC STUDY, FLUOROSCOPIC;  Surgeon: Viridiana Valenzuela MD;  Location: [facility] OR 1ST FLR;  Service: Urology;  Laterality: N/A;  90 minutes     HYSTERECTOMY      posterolateral fusion with autograft bone and Eun mineralized bone matrix  13    at [facility] for lumbar spine stenosis    TOE SURGERY      TONSILLECTOMY      TRIGGER FINGER RELEASE         Review of patient's allergies indicates:   Allergen Reactions    Dilaudid [hydromorphone (bulk)] Other (See Comments)     Oversedating, head burning. Pt prefers to avoid.       Codeine Nausea And Vomiting     Other reaction(s): Itching    Dilaudid [hydromorphone]     Percocet  [oxycodone-acetaminophen]      Other reaction(s): Itching    Sulfa (sulfonamide antibiotics)      Other reaction(s): Nausea  Other reaction(s): Itching       No current facility-administered medications on file prior to encounter.     Current Outpatient Medications on File Prior to Encounter   Medication Sig    ascorbic Acid (VITAMIN C) 500 mg CpSR Take 500 mg by mouth once daily.     atorvastatin (LIPITOR) 40 MG tablet TAKE 1 TABLET BY MOUTH  DAILY    azelastine (ASTELIN) 137 mcg (0.1 %) nasal spray 2 sprays (274 mcg total) by Nasal route 2 (two) times daily.    biotin 5,000 mcg TbDL Take 1 tablet by mouth once daily.     butalbital-acetaminophen-caffeine -40 mg (FIORICET, ESGIC) -40 mg per tablet SMARTSI Tablet(s) By Mouth 1 to 3 Times Daily PRN    cholestyramine-aspartame  (CHOLESTYRAMINE LIGHT) 4 gram PwPk Take 1 packet (4 g total) by mouth daily as needed (Diarrhea).    clotrimazole-betamethasone 1-0.05% (LOTRISONE) cream Apply topically 2 (two) times daily.    co-enzyme Q-10 30 mg capsule Take 30 mg by mouth 3 (three) times daily.    conjugated estrogens (PREMARIN) vaginal cream INSERT 1/2 GRAM VAGINALLY  TWICE WEEKLY    cranberry extract 200 mg Cap 1 capsule DAILY (route: oral)    D-MANNOSE ORAL Take 1 tablet by mouth once daily.     dicyclomine (BENTYL) 20 mg tablet Take 1 tablet (20 mg total) by mouth 2 (two) times daily. for 15 days (Patient not taking: Reported on 2/9/2022)    DULoxetine (CYMBALTA) 30 MG capsule Take 1 capsule (30 mg total) by mouth 2 (two) times daily.    flash glucose sensor (FREESTYLE EFREN 14 DAY SENSOR) Kit 1 application by Misc.(Non-Drug; Combo Route) route every 14 (fourteen) days. Disp 30 or 90 day refill    FREESTYLE ERFEN 10 DAY READER Misc TEST as directed    FREESTYLE LITE STRIPS Strp TEST DAILY AS DIRECTED    HYDROcodone-acetaminophen (NORCO) 5-325 mg per tablet Take 1 tablet by mouth every 12 (twelve) hours as needed for Pain.    hydrocortisone 2.5 % cream Apply topically 2 (two) times daily as needed.    hyoscyamine (ANASPAZ,LEVSIN) 0.125 mg Tab Take 1 tablet (125 mcg total) by mouth every 8 (eight) hours as needed (urgency).    Lactobacillus rhamnosus GG (CULTURELLE) 10 billion cell capsule Take 1 capsule by mouth once daily.    lactulose (CHRONULAC) 10 gram/15 mL solution Take 30 mLs (20 g total) by mouth once daily.    levothyroxine (SYNTHROID) 75 MCG tablet Take 1 tablet (75 mcg total) by mouth before breakfast.    LIDOcaine (LIDODERM) 5 % Place 1 patch onto the skin once daily. Remove & Discard patch within 12 hours or as directed by MD    magnesium 30 mg Tab Take 500 capsules by mouth.     montelukast (SINGULAIR) 10 mg tablet TAKE 1 TABLET BY MOUTH  DAILY    nitrofurantoin, macrocrystal-monohydrate, (MACROBID) 100 MG  capsule Take 1 capsule (100 mg total) by mouth 2 (two) times daily. for 7 days    omega 3-dha-epa-fish oil 1,200 (144-216) mg Cap Take 1 capsule by mouth once daily.     ondansetron (ZOFRAN-ODT) 8 MG TbDL Take 1 tablet (8 mg total) by mouth every 8 (eight) hours as needed (nausea).    pantoprazole (PROTONIX) 20 MG tablet Take 1 tablet (20 mg total) by mouth once daily.    polyethylene glycol (GLYCOLAX) 17 gram/dose powder Take 17 g by mouth before dinner.    promethazine-codeine 6.25-10 mg/5 ml (PHENERGAN WITH CODEINE) 6.25-10 mg/5 mL syrup Take 5 mLs by mouth every 4 to 6 hours as needed for Cough.    psyllium husk, aspartame, (METAMUCIL) 3.4 gram PwPk packet Take 1 packet by mouth 2 (two) times daily.    senna-docusate 8.6-50 mg (PERICOLACE) 8.6-50 mg per tablet Take 1 tablet by mouth 2 (two) times daily.    simethicone (MYLICON) 80 MG chewable tablet Take 1 tablet (80 mg total) by mouth every 6 (six) hours as needed for Flatulence.    valACYclovir (VALTREX) 500 MG tablet Take 1 tablet (500 mg total) by mouth 3 (three) times daily.    vitamin D 1000 units Tab Take 185 mg by mouth once daily. D3     Family History     Problem Relation (Age of Onset)    Alcohol abuse Brother, Brother    Arthritis Daughter, Brother    Asthma Daughter    Bladder Cancer Mother    Breast cancer Sister (79)    Cataracts Mother    Colon cancer Father, Brother (70)    Depression Daughter, Daughter    Glaucoma Mother    Heart disease Father (50), Mother    Hyperlipidemia Mother    Hypertension Daughter    Kidney disease Mother    Parkinsonism Brother    Stroke Daughter (40)        Tobacco Use    Smoking status: Never Smoker    Smokeless tobacco: Never Used   Substance and Sexual Activity    Alcohol use: Yes     Comment: socially, hardly ever    Drug use: No    Sexual activity: Not Currently     Review of Systems   Constitutional: Positive for appetite change and fatigue. Negative for fever.   HENT: Negative for rhinorrhea and  trouble swallowing.    Eyes: Positive for photophobia. Negative for visual disturbance.   Respiratory: Negative for cough and shortness of breath.    Cardiovascular: Negative for chest pain and palpitations.   Gastrointestinal: Positive for abdominal pain, constipation, nausea and vomiting. Negative for abdominal distention and blood in stool.   Endocrine: Negative for polyphagia and polyuria.   Genitourinary: Positive for dysuria and urgency.   Musculoskeletal: Positive for arthralgias and back pain.   Skin: Positive for rash. Negative for wound.   Neurological: Negative for light-headedness and headaches.   Psychiatric/Behavioral: Negative for agitation and confusion.     Objective:     Vital Signs (Most Recent):  Temp: 97.7 °F (36.5 °C) (02/10/22 0344)  Pulse: 104 (02/10/22 0344)  Resp: 16 (02/10/22 0344)  BP: (!) 101/59 (02/10/22 0344)  SpO2: 95 % (02/10/22 0344) Vital Signs (24h Range):  Temp:  [97.7 °F (36.5 °C)-98.7 °F (37.1 °C)] 97.7 °F (36.5 °C)  Pulse:  [] 104  Resp:  [16-20] 16  SpO2:  [95 %-97 %] 95 %  BP: (101-118)/(57-70) 101/59     Weight: 61.7 kg (136 lb)  Body mass index is 24.87 kg/m².    Physical Exam  Constitutional:       Appearance: Normal appearance. She is normal weight. She is ill-appearing. She is not toxic-appearing.   HENT:      Head: Normocephalic and atraumatic.      Right Ear: External ear normal.      Left Ear: External ear normal.      Nose: Nose normal.      Mouth/Throat:      Mouth: Mucous membranes are dry.      Pharynx: Oropharynx is clear. No oropharyngeal exudate.   Eyes:      General: No scleral icterus.     Extraocular Movements: Extraocular movements intact.      Conjunctiva/sclera: Conjunctivae normal.   Cardiovascular:      Rate and Rhythm: Regular rhythm. Tachycardia present.      Pulses: Normal pulses.      Heart sounds: Normal heart sounds. No murmur heard.      Pulmonary:      Effort: Pulmonary effort is normal. No respiratory distress.      Breath sounds: Normal  breath sounds.   Abdominal:      General: Abdomen is flat. Bowel sounds are normal. There is no distension.      Tenderness: There is abdominal tenderness. There is no guarding.   Musculoskeletal:         General: No swelling. Normal range of motion.      Cervical back: Normal range of motion and neck supple. No rigidity or tenderness.      Right lower leg: No edema.      Left lower leg: No edema.   Skin:     General: Skin is warm and dry.      Capillary Refill: Capillary refill takes 2 to 3 seconds.      Findings: Rash present.   Neurological:      General: No focal deficit present.      Mental Status: She is alert and oriented to person, place, and time. Mental status is at baseline.   Psychiatric:         Mood and Affect: Mood normal.         Behavior: Behavior normal.         Thought Content: Thought content normal.         Judgment: Judgment normal.             Significant Labs: All pertinent labs within the past 24 hours have been reviewed.    Significant Imaging: I have reviewed all pertinent imaging results/findings within the past 24 hours.

## 2022-02-10 NOTE — PLAN OF CARE
Patient not stable for discharge at this time.  SW anticipate resume [facility]. SW will continue to follow patient's progress to discharge. SW remains available for any family concerns or needs.      Samantha Aldana LMSW  PRN - [facility]  [phone]

## 2022-02-10 NOTE — PT/OT/SLP PROGRESS
"Occupational Therapy      Patient Name:  Anayeli Judd   MRN:  [id number]    Patient not seen today secondary to patient unwilling to participate after participating in evaluation with PT.  "I am too tired, and they gave me a blocker - I just don't want to do it again."  Will follow-up 2/11/22.    2/10/2022  "

## 2022-02-10 NOTE — H&P
Mat[facility] - Emergency Dept  [facility] Medicine  History & Physical    Patient Name: Anayeli Judd  MRN: [id number]  Patient Class: IP- Inpatient  Admission Date: 2022  Attending Physician: Katie Nicole MD   Primary Care Provider: Darci Guthrie MD         Patient information was obtained from patient, relative(s), past medical records and ER records.     Subjective:     Principal Problem:SBO (small bowel obstruction)    Chief Complaint:   Chief Complaint   Patient presents with    Abdominal Pain     Unsure last bm. Concern for SBO. Pt began vomiting today. Also on antibiotics for UTI.         HPI: Anayeli Judd is a 77F with hypothyroid, anxiety, T2DM, fatty liver, hx of colon cancer, and recurrent SBO who presented for abdominal pain of multiple day duration which has been worsening and is associated with constipation and vomiting. She reports small Bms with taking Miralax and Metamucil. Tolerates PO intake though associated with mild nausea. Of note, she was recently started on antibiotics for a UTI by her PCP. She has had similar abdominal pain episodes (5 presentations since Nov) for SBO. She was recently seen in Gen Surg outpt clinic and has elective ex-lap scheduled 3/ for lysis of adhesions.    In the ED, VSS. Labs notable  for hyponatremia, elevated bilirubin, elevated LFTs, elevated glucose 221, anemia, and thrombocytopenia. UA concerning for UTI. CT A/P w/ contrast positive for partial SBO at similar site as prior episodes and scheduled surgery. Patient was admitted to hospital medicine for further management.      Past Medical History:   Diagnosis Date    Abdominal pain     Allergic rhinitis     Arthritis     Blood platelet disorder     Blood platelet disorder     Blood transfusion     during delivery and     Bowel obstruction     Cervical radiculopathy     followed by dr cloud    Colon cancer     transverse colon; resected; Stage IIA (pT3 pN0 MX)    Diabetes  mellitus     Diarrhea     Falls 2020    Family history of breast cancer     Family history of colon cancer     Fatty liver     GERD (gastroesophageal reflux disease)     History of shingles     Hyperlipidemia     Hypothyroidism     Irritable bowel syndrome     Microscopic colitis     treated 2013    Raynaud phenomenon     Raynaud's disease     Type 2 diabetes mellitus        Past Surgical History:   Procedure Laterality Date    APPENDECTOMY      BACK SURGERY      CARPAL TUNNEL RELEASE      bilateral      SECTION      CHOLECYSTECTOMY  1965    COLECTOMY  2011    Transverse colon resection by Dr. Aguirre    COLONOSCOPY N/A 2017    Procedure: COLONOSCOPY;  Surgeon: Manjit Alvarez MD;  Location: [facility] (4TH FLR);  Service: Endoscopy;  Laterality: N/A;    COLONOSCOPY N/A 2019    Procedure: COLONOSCOPY;  Surgeon: Ramiro Jefferson MD;  Location: [facility] (4TH FLR);  Service: Endoscopy;  Laterality: N/A;  PM prep    CYSTOSCOPY N/A 2021    Procedure: CYSTOSCOPY;  Surgeon: Viridiana Valenzuela MD;  Location: [facility] 1ST FLR;  Service: Urology;  Laterality: N/A;    ENDOSCOPIC ULTRASOUND OF UPPER GASTROINTESTINAL TRACT N/A 2018    Procedure: ULTRASOUND, UPPER GI TRACT, ENDOSCOPIC;  Surgeon: Jose Hess MD;  Location: [facility];  Service: Endoscopy;  Laterality: N/A;    ENDOSCOPIC ULTRASOUND OF UPPER GASTROINTESTINAL TRACT N/A 2019    Procedure: ULTRASOUND, UPPER GI TRACT, ENDOSCOPIC;  Surgeon: Jose Hess MD;  Location: [facility] (2ND FLR);  Service: Endoscopy;  Laterality: N/A;    ESOPHAGOGASTRODUODENOSCOPY N/A 2018    Procedure: EGD (ESOPHAGOGASTRODUODENOSCOPY);  Surgeon: Angelo Reynolds MD;  Location: [facility] (2ND FLR);  Service: Endoscopy;  Laterality: N/A;    ESOPHAGOGASTRODUODENOSCOPY N/A 2019    Procedure: EGD (ESOPHAGOGASTRODUODENOSCOPY);  Surgeon: Ramiro Jefferson MD;  Location: [facility] (4TH FLR);  Service: Endoscopy;   Laterality: N/A;    EYE SURGERY      Cataract Removal    FLUOROSCOPIC URODYNAMIC STUDY N/A 2021    Procedure: URODYNAMIC STUDY, FLUOROSCOPIC;  Surgeon: Viridiana Valenzuela MD;  Location: [facility] OR [address] [place] [address];  Service: Urology;  Laterality: N/A;  90 minutes     HYSTERECTOMY      posterolateral fusion with autograft bone and Eun mineralized bone matrix  13    at [facility] for lumbar spine stenosis    TOE SURGERY      TONSILLECTOMY      TRIGGER FINGER RELEASE         Review of patient's allergies indicates:   Allergen Reactions    Dilaudid [hydromorphone (bulk)] Other (See Comments)     Oversedating, head burning. Pt prefers to avoid.       Codeine Nausea And Vomiting     Other reaction(s): Itching    Dilaudid [hydromorphone]     Percocet  [oxycodone-acetaminophen]      Other reaction(s): Itching    Sulfa (sulfonamide antibiotics)      Other reaction(s): Nausea  Other reaction(s): Itching       No current facility-administered medications on file prior to encounter.     Current Outpatient Medications on File Prior to Encounter   Medication Sig    ascorbic Acid (VITAMIN C) 500 mg CpSR Take 500 mg by mouth once daily.     atorvastatin (LIPITOR) 40 MG tablet TAKE 1 TABLET BY MOUTH  DAILY    azelastine (ASTELIN) 137 mcg (0.1 %) nasal spray 2 sprays (274 mcg total) by Nasal route 2 (two) times daily.    biotin 5,000 mcg TbDL Take 1 tablet by mouth once daily.     butalbital-acetaminophen-caffeine -40 mg (FIORICET, ESGIC) -40 mg per tablet SMARTSI Tablet(s) By Mouth 1 to 3 Times Daily PRN    cholestyramine-aspartame (CHOLESTYRAMINE LIGHT) 4 gram PwPk Take 1 packet (4 g total) by mouth daily as needed (Diarrhea).    clotrimazole-betamethasone 1-0.05% (LOTRISONE) cream Apply topically 2 (two) times daily.    co-enzyme Q-10 30 mg capsule Take 30 mg by mouth 3 (three) times daily.    conjugated estrogens (PREMARIN) vaginal cream INSERT 1/2 GRAM VAGINALLY  TWICE WEEKLY    cranberry extract  200 mg Cap 1 capsule DAILY (route: oral)    D-MANNOSE ORAL Take 1 tablet by mouth once daily.     dicyclomine (BENTYL) 20 mg tablet Take 1 tablet (20 mg total) by mouth 2 (two) times daily. for 15 days (Patient not taking: Reported on 2/9/2022)    DULoxetine (CYMBALTA) 30 MG capsule Take 1 capsule (30 mg total) by mouth 2 (two) times daily.    flash glucose sensor (FREESTYLE EFREN 14 DAY SENSOR) Kit 1 application by Misc.(Non-Drug; Combo Route) route every 14 (fourteen) days. Disp 30 or 90 day refill    FREESTYLE EFREN 10 DAY READER Misc TEST as directed    FREESTYLE LITE STRIPS Strp TEST DAILY AS DIRECTED    HYDROcodone-acetaminophen (NORCO) 5-325 mg per tablet Take 1 tablet by mouth every 12 (twelve) hours as needed for Pain.    hydrocortisone 2.5 % cream Apply topically 2 (two) times daily as needed.    hyoscyamine (ANASPAZ,LEVSIN) 0.125 mg Tab Take 1 tablet (125 mcg total) by mouth every 8 (eight) hours as needed (urgency).    Lactobacillus rhamnosus GG (CULTURELLE) 10 billion cell capsule Take 1 capsule by mouth once daily.    lactulose (CHRONULAC) 10 gram/15 mL solution Take 30 mLs (20 g total) by mouth once daily.    levothyroxine (SYNTHROID) 75 MCG tablet Take 1 tablet (75 mcg total) by mouth before breakfast.    LIDOcaine (LIDODERM) 5 % Place 1 patch onto the skin once daily. Remove & Discard patch within 12 hours or as directed by MD    magnesium 30 mg Tab Take 500 capsules by mouth.     montelukast (SINGULAIR) 10 mg tablet TAKE 1 TABLET BY MOUTH  DAILY    nitrofurantoin, macrocrystal-monohydrate, (MACROBID) 100 MG capsule Take 1 capsule (100 mg total) by mouth 2 (two) times daily. for 7 days    omega 3-dha-epa-fish oil 1,200 (144-216) mg Cap Take 1 capsule by mouth once daily.     ondansetron (ZOFRAN-ODT) 8 MG TbDL Take 1 tablet (8 mg total) by mouth every 8 (eight) hours as needed (nausea).    pantoprazole (PROTONIX) 20 MG tablet Take 1 tablet (20 mg total) by mouth once daily.     polyethylene glycol (GLYCOLAX) 17 gram/dose powder Take 17 g by mouth before dinner.    promethazine-codeine 6.25-10 mg/5 ml (PHENERGAN WITH CODEINE) 6.25-10 mg/5 mL syrup Take 5 mLs by mouth every 4 to 6 hours as needed for Cough.    psyllium husk, aspartame, (METAMUCIL) 3.4 gram PwPk packet Take 1 packet by mouth 2 (two) times daily.    senna-docusate 8.6-50 mg (PERICOLACE) 8.6-50 mg per tablet Take 1 tablet by mouth 2 (two) times daily.    simethicone (MYLICON) 80 MG chewable tablet Take 1 tablet (80 mg total) by mouth every 6 (six) hours as needed for Flatulence.    valACYclovir (VALTREX) 500 MG tablet Take 1 tablet (500 mg total) by mouth 3 (three) times daily.    vitamin D 1000 units Tab Take 185 mg by mouth once daily. D3     Family History     Problem Relation (Age of Onset)    Alcohol abuse Brother, Brother    Arthritis Daughter, Brother    Asthma Daughter    Bladder Cancer Mother    Breast cancer Sister (79)    Cataracts Mother    Colon cancer Father, Brother (70)    Depression Daughter, Daughter    Glaucoma Mother    Heart disease Father (50), Mother    Hyperlipidemia Mother    Hypertension Daughter    Kidney disease Mother    Parkinsonism Brother    Stroke Daughter (40)        Tobacco Use    Smoking status: Never Smoker    Smokeless tobacco: Never Used   Substance and Sexual Activity    Alcohol use: Yes     Comment: socially, hardly ever    Drug use: No    Sexual activity: Not Currently     Review of Systems   Constitutional: Positive for appetite change and fatigue. Negative for fever.   HENT: Negative for rhinorrhea and trouble swallowing.    Eyes: Positive for photophobia. Negative for visual disturbance.   Respiratory: Negative for cough and shortness of breath.    Cardiovascular: Negative for chest pain and palpitations.   Gastrointestinal: Positive for abdominal pain, constipation, nausea and vomiting. Negative for abdominal distention and blood in stool.   Endocrine: Negative for  polyphagia and polyuria.   Genitourinary: Positive for dysuria and urgency.   Musculoskeletal: Positive for arthralgias and back pain.   Skin: Positive for rash. Negative for wound.   Neurological: Negative for light-headedness and headaches.   Psychiatric/Behavioral: Negative for agitation and confusion.     Objective:     Vital Signs (Most Recent):  Temp: 97.7 °F (36.5 °C) (02/10/22 0344)  Pulse: 104 (02/10/22 0344)  Resp: 16 (02/10/22 0344)  BP: (!) 101/59 (02/10/22 0344)  SpO2: 95 % (02/10/22 0344) Vital Signs (24h Range):  Temp:  [97.7 °F (36.5 °C)-98.7 °F (37.1 °C)] 97.7 °F (36.5 °C)  Pulse:  [] 104  Resp:  [16-20] 16  SpO2:  [95 %-97 %] 95 %  BP: (101-118)/(57-70) 101/59     Weight: 61.7 kg (136 lb)  Body mass index is 24.87 kg/m².    Physical Exam  Constitutional:       Appearance: Normal appearance. She is normal weight. She is ill-appearing. She is not toxic-appearing.   HENT:      Head: Normocephalic and atraumatic.      Right Ear: External ear normal.      Left Ear: External ear normal.      Nose: Nose normal.      Mouth/Throat:      Mouth: Mucous membranes are dry.      Pharynx: Oropharynx is clear. No oropharyngeal exudate.   Eyes:      General: No scleral icterus.     Extraocular Movements: Extraocular movements intact.      Conjunctiva/sclera: Conjunctivae normal.   Cardiovascular:      Rate and Rhythm: Regular rhythm. Tachycardia present.      Pulses: Normal pulses.      Heart sounds: Normal heart sounds. No murmur heard.      Pulmonary:      Effort: Pulmonary effort is normal. No respiratory distress.      Breath sounds: Normal breath sounds.   Abdominal:      General: Abdomen is flat. Bowel sounds are normal. There is no distension.      Tenderness: There is abdominal tenderness. There is no guarding.   Musculoskeletal:         General: No swelling. Normal range of motion.      Cervical back: Normal range of motion and neck supple. No rigidity or tenderness.      Right lower leg: No edema.       Left lower leg: No edema.   Skin:     General: Skin is warm and dry.      Capillary Refill: Capillary refill takes 2 to 3 seconds.      Findings: Rash present.   Neurological:      General: No focal deficit present.      Mental Status: She is alert and oriented to person, place, and time. Mental status is at baseline.   Psychiatric:         Mood and Affect: Mood normal.         Behavior: Behavior normal.         Thought Content: Thought content normal.         Judgment: Judgment normal.             Significant Labs: All pertinent labs within the past 24 hours have been reviewed.    Significant Imaging: I have reviewed all pertinent imaging results/findings within the past 24 hours.    Assessment/Plan:     * SBO (small bowel obstruction)  Anayeli Judd is a 77F with hypothyroid, anxiety, T2DM, fatty liver, hx of colon cancer, and recurrent SBO who presented for abdominal pain which is similar to her recurrent partial SBO. She is scheduled for elective ex-lap with lysis of adhesions on 3/4 with gen surg pending medical clearance.     - Gen surg consulted, appreciate recs  - NPO  - IVFs - LR 100cc/hr  - Holding lactulose         UTI (urinary tract infection)  UA WBC >100  Continue nitrofurantoin 100mg BID      Anxiety  Continue duloxetine 30mg BID      Hyponatremia  F/u urine lytes      Acute deep vein thrombosis (DVT) of upper extremity  Hx of DVT   Lovenox DVT ppx      Type 2 diabetes mellitus without complication, without long-term current use of insulin  Diet controlled   A1c (two weeks ago) 6.9%  Glucose 221 on admission    LDSSI  POCT BG q6h while NPO      Gait instability  PT/OT      Fatty liver  Tbili 2.8, , and  on admission  Tbili baseline 1.5-1.9  Liver on CT A/P with no cirrhosis or hepatomegaly    Continue to monitor   Daily CMP      Irritable bowel syndrome with both constipation and diarrhea  Holding medications while SBO        History of colon cancer  s/p resection 2010 and  xlap/sparkle/bowel resection x2      Hypothyroid  Continue levothyroxine 75 mcg daily        VTE Risk Mitigation (From admission, onward)         Ordered     enoxaparin injection 40 mg  Daily         02/10/22 0325     IP VTE HIGH RISK PATIENT  Once         02/10/22 0325     Place sequential compression device  Until discontinued         02/10/22 0325                   Linda Snider ([place] Name: Steve), MD  Department of Hospital Medicine   [facility] - Emergency Dept

## 2022-02-10 NOTE — ASSESSMENT & PLAN NOTE
Tbili 2.8, , and  on admission  Tbili baseline 1.5-1.9  Liver on CT A/P with no cirrhosis or hepatomegaly    Continue to monitor   Daily CMP

## 2022-02-10 NOTE — ASSESSMENT & PLAN NOTE
Anayeli Judd is a 77F with hypothyroid, anxiety, T2DM, fatty liver, hx of colon cancer, and recurrent SBO who presented for abdominal pain which is similar to her recurrent partial SBO. She is scheduled for elective ex-lap with lysis of adhesions on 3/4 with gen surg pending medical clearance.     - Gen surg consulted, appreciate recs  - NPO  - IVFs - LR 100cc/hr  - Holding lactulose

## 2022-02-10 NOTE — PLAN OF CARE
Problem: Physical Therapy Goal  Goal: Physical Therapy Goal  Description: Goals to be met by: 22     Patient will increase functional independence with mobility by performin. Supine to sit with [place]  2. Sit to supine with [place]  3. Sit to stand transfer with [place]  4. Gait  x 150 feet with Supervision using LRAD  5. Lower extremity exercise program x 20 reps per handout, with independence      Outcome: Ongoing, Progressing   Pt progressing, appropriate goals established

## 2022-02-10 NOTE — ED PROVIDER NOTES
History:  Anayeli Judd is a 77 y.o. female who presents to the ED with Abdominal Pain (Unsure last bm. Concern for SBO. Pt began vomiting today. Also on antibiotics for UTI. )    Described as 77-year-old female with a complicated history including colon cancer, multiple partial SBO is a in the past, diabetes, recent diagnosis of UTI started on Macrobid presenting to the emergency department with abdominal pain.  She reports she saw her [facility] medicine doctor today and was given a dose of lactulose and Mag citrate, which she took around 4:30 p.m..  She has not passed gas or had a bowel movement since taking his medications he reports her pain acutely worsened.  Her pain is diffuse, constant, nonradiating, feeling like her prior SBO episodes.  She endorses nonbloody nonbilious emesis.  She denies any fevers or chills.    Review of Systems: All systems reviewed and are negative except as per history of present illness.  Constitutional: Negative for fever.   HENT: Negative for congestion.    Respiratory: Negative for shortness of breath.    Cardiovascular: Negative for chest pain.   Gastrointestinal: + for abdominal pain, +N/V, +constipation  Genitourinary: Negative for dysuria.   Musculoskeletal: Negative for myalgias.   Skin: Negative for rash.   Neurological: Negative for focal weakness.   Psychiatric/Behavioral: Negative for memory loss.     Medications:   Current Discharge Medication List      CONTINUE these medications which have NOT CHANGED    Details   ascorbic Acid (VITAMIN C) 500 mg CpSR Take 500 mg by mouth once daily.       atorvastatin (LIPITOR) 40 MG tablet TAKE 1 TABLET BY MOUTH  DAILY  Qty: 90 tablet, Refills: 3    Comments: Requesting 1 year supply  Associated Diagnoses: Hyperlipidemia, unspecified hyperlipidemia type      azelastine (ASTELIN) 137 mcg (0.1 %) nasal spray 2 sprays (274 mcg total) by Nasal route 2 (two) times daily.  Qty: 30 mL, Refills: 1    Associated Diagnoses: Rhinitis       biotin 5,000 mcg TbDL Take 1 tablet by mouth once daily.       butalbital-acetaminophen-caffeine -40 mg (FIORICET, ESGIC) -40 mg per tablet SMARTSI Tablet(s) By Mouth 1 to 3 Times Daily PRN      cholestyramine-aspartame (CHOLESTYRAMINE LIGHT) 4 gram PwPk Take 1 packet (4 g total) by mouth daily as needed (Diarrhea).  Qty: 90 packet, Refills: 0    Comments: Take this medicine 4 hours before or 4 hours after your other medications so this medication does not accidentally bind your other medications and prevent your other medicines from being absorbed  Associated Diagnoses: Diarrhea in adult patient      clotrimazole-betamethasone 1-0.05% (LOTRISONE) cream Apply topically 2 (two) times daily.      co-enzyme Q-10 30 mg capsule Take 30 mg by mouth 3 (three) times daily.      conjugated estrogens (PREMARIN) vaginal cream INSERT 1/2 GRAM VAGINALLY  TWICE WEEKLY  Qty: 30 g, Refills: 3    Comments: Requesting 1 year supply  Associated Diagnoses: Vaginal atrophy      cranberry extract 200 mg Cap 1 capsule DAILY (route: oral)      D-MANNOSE ORAL Take 1 tablet by mouth once daily.       dicyclomine (BENTYL) 20 mg tablet Take 1 tablet (20 mg total) by mouth 2 (two) times daily. for 15 days  Qty: 30 tablet, Refills: 0      DULoxetine (CYMBALTA) 30 MG capsule Take 1 capsule (30 mg total) by mouth 2 (two) times daily.  Qty: 180 capsule, Refills: 3    Comments: Requesting 1 year supply      flash glucose sensor (FREESTYLE EFREN 14 DAY SENSOR) Kit 1 application by Misc.(Non-Drug; Combo Route) route every 14 (fourteen) days. Disp 30 or 90 day refill  Qty: 6 kit, Refills: 6      FREESTYLE EFREN 10 DAY READER Misc TEST as directed  Qty: 3 each, Refills: 3      FREESTYLE LITE STRIPS Strp TEST DAILY AS DIRECTED  Qty: 50 strip, Refills: 2    Associated Diagnoses: Diabetes mellitus, type 2      HYDROcodone-acetaminophen (NORCO) 5-325 mg per tablet Take 1 tablet by mouth every 12 (twelve) hours as needed for Pain.  Qty: 10  tablet, Refills: 0    Comments: Quantity prescribed more than 7 day supply? No  Associated Diagnoses: Abdominal pain, unspecified abdominal location      hydrocortisone 2.5 % cream Apply topically 2 (two) times daily as needed.  Qty: 1 each, Refills: 1      hyoscyamine (ANASPAZ,LEVSIN) 0.125 mg Tab Take 1 tablet (125 mcg total) by mouth every 8 (eight) hours as needed (urgency).  Qty: 90 tablet, Refills: 3    Associated Diagnoses: Urge incontinence      Lactobacillus rhamnosus GG (CULTURELLE) 10 billion cell capsule Take 1 capsule by mouth once daily.  Qty: 30 capsule, Refills: 0      lactulose (CHRONULAC) 10 gram/15 mL solution Take 30 mLs (20 g total) by mouth once daily.  Qty: 473 mL, Refills: 5    Associated Diagnoses: Constipation, unspecified constipation type      levothyroxine (SYNTHROID) 75 MCG tablet Take 1 tablet (75 mcg total) by mouth before breakfast.  Qty: 90 tablet, Refills: 3      LIDOcaine (LIDODERM) 5 % Place 1 patch onto the skin once daily. Remove & Discard patch within 12 hours or as directed by MD  Qty: 30 patch, Refills: 0      magnesium 30 mg Tab Take 500 capsules by mouth.       montelukast (SINGULAIR) 10 mg tablet TAKE 1 TABLET BY MOUTH  DAILY  Qty: 90 tablet, Refills: 3    Comments: Requesting 1 year supply      nitrofurantoin, macrocrystal-monohydrate, (MACROBID) 100 MG capsule Take 1 capsule (100 mg total) by mouth 2 (two) times daily. for 7 days  Qty: 14 capsule, Refills: 0    Associated Diagnoses: Urinary tract infection without hematuria, site unspecified      omega 3-dha-epa-fish oil 1,200 (144-216) mg Cap Take 1 capsule by mouth once daily.       ondansetron (ZOFRAN-ODT) 8 MG TbDL Take 1 tablet (8 mg total) by mouth every 8 (eight) hours as needed (nausea).  Qty: 30 tablet, Refills: 0      pantoprazole (PROTONIX) 20 MG tablet Take 1 tablet (20 mg total) by mouth once daily.  Qty: 30 tablet, Refills: 2      polyethylene glycol (GLYCOLAX) 17 gram/dose powder Take 17 g by mouth before  dinner.  Qty: 1530 g, Refills: 1    Comments: Take 17 g in 12 oz water once daily before dinner  Associated Diagnoses: Constipation, chronic; Partial small bowel obstruction      promethazine-codeine 6.25-10 mg/5 ml (PHENERGAN WITH CODEINE) 6.25-10 mg/5 mL syrup Take 5 mLs by mouth every 4 to 6 hours as needed for Cough.  Qty: 240 mL, Refills: 0    Comments: Quantity prescribed more than 7 day supply? No      psyllium husk, aspartame, (METAMUCIL) 3.4 gram PwPk packet Take 1 packet by mouth 2 (two) times daily.  Qty: 60 packet, Refills: 0      senna-docusate 8.6-50 mg (PERICOLACE) 8.6-50 mg per tablet Take 1 tablet by mouth 2 (two) times daily.  Qty: 60 tablet, Refills: 2      simethicone (MYLICON) 80 MG chewable tablet Take 1 tablet (80 mg total) by mouth every 6 (six) hours as needed for Flatulence.  Qty: 30 tablet, Refills: 0      valACYclovir (VALTREX) 500 MG tablet Take 1 tablet (500 mg total) by mouth 3 (three) times daily.  Qty: 30 tablet, Refills: 0      vitamin D 1000 units Tab Take 185 mg by mouth once daily. D3             PMH:   Past Medical History:   Diagnosis Date    Abdominal pain     Allergic rhinitis     Arthritis     Blood platelet disorder     Blood platelet disorder     Blood transfusion     during delivery and     Bowel obstruction     Cervical radiculopathy     followed by dr cloud    Colon cancer     transverse colon; resected; Stage IIA (pT3 pN0 MX)    Diabetes mellitus     Diarrhea     Falls 2020    Family history of breast cancer     Family history of colon cancer     Fatty liver     GERD (gastroesophageal reflux disease)     History of shingles     Hyperlipidemia     Hypothyroidism     Irritable bowel syndrome     Microscopic colitis     treated     Raynaud phenomenon     Raynaud's disease     Type 2 diabetes mellitus      PSH:   Past Surgical History:   Procedure Laterality Date    APPENDECTOMY      BACK SURGERY      CARPAL TUNNEL RELEASE       bilateral      SECTION      CHOLECYSTECTOMY  1965    COLECTOMY  2011    Transverse colon resection by Dr. Aguirre    COLONOSCOPY N/A 2017    Procedure: COLONOSCOPY;  Surgeon: Manjit Alvarez MD;  Location: [facility] (4TH FLR);  Service: Endoscopy;  Laterality: N/A;    COLONOSCOPY N/A 2019    Procedure: COLONOSCOPY;  Surgeon: Ramiro Jefferson MD;  Location: [facility] (4TH FLR);  Service: Endoscopy;  Laterality: N/A;  PM prep    CYSTOSCOPY N/A 2021    Procedure: CYSTOSCOPY;  Surgeon: Viridiana Valenzuela MD;  [facility] [facility] [place] 1[facility];  Service: Urology;  Laterality: N/A;    ENDOSCOPIC ULTRASOUND OF UPPER GASTROINTESTINAL TRACT N/A 2018    Procedure: ULTRASOUND, UPPER GI TRACT, ENDOSCOPIC;  Surgeon: Jose Hess MD;  Location: [facility] ENDO;  Service: Endoscopy;  Laterality: N/A;    ENDOSCOPIC ULTRASOUND OF UPPER GASTROINTESTINAL TRACT N/A 2019    Procedure: ULTRASOUND, UPPER GI TRACT, ENDOSCOPIC;  Surgeon: Jose Hess MD;  Location: [facility] (2ND FLR);  Service: Endoscopy;  Laterality: N/A;    ESOPHAGOGASTRODUODENOSCOPY N/A 2018    Procedure: EGD (ESOPHAGOGASTRODUODENOSCOPY);  Surgeon: Angelo Reynolds MD;  Location: [facility] (2ND FLR);  Service: Endoscopy;  Laterality: N/A;    ESOPHAGOGASTRODUODENOSCOPY N/A 2019    Procedure: EGD (ESOPHAGOGASTRODUODENOSCOPY);  Surgeon: Ramiro Jefferson MD;  Location: [facility] (4TH FLR);  Service: Endoscopy;  Laterality: N/A;    EYE SURGERY      Cataract Removal    FLUOROSCOPIC URODYNAMIC STUDY N/A 2021    Procedure: URODYNAMIC STUDY, FLUOROSCOPIC;  Surgeon: Viridiana Valenzuela MD;  [facility] [facility] 1[facility] F[facility];  Service: Urology;  Laterality: N/A;  90 minutes     HYSTERECTOMY      posterolateral fusion with autograft bone and [place] mineralized bone matrix  13    at [facility] for lumbar spine stenosis    TOE SURGERY      TONSILLECTOMY      TRIGGER FINGER RELEASE       Allergies: She is allergic to  "dilaudid [hydromorphone (bulk)], codeine, dilaudid [hydromorphone], percocet  [oxycodone-acetaminophen], and sulfa (sulfonamide antibiotics).  Social History: Marital Status: . She  reports that she has never smoked. She has never used smokeless tobacco.. She  reports current alcohol use..       Exam:  VITAL SIGNS:   Vitals:    02/10/22 0344 02/10/22 0605 02/10/22 0620 02/10/22 0755   BP: (!) 101/59 (!) 109/54 (!) 109/54 (!) 103/55   BP Location:  Left arm  Left arm   Patient Position:  Sitting  Lying   Pulse: 104 68 68 69   Resp: 16 18 18 18   Temp: 97.7 °F (36.5 °C) 97.4 °F (36.3 °C) 97.4 °F (36.3 °C) 97.6 °F (36.4 °C)   TempSrc:  Oral  Oral   SpO2: 95% 96% 96% (!) 92%   Weight:  60.3 kg (132 lb 15 oz) 60.3 kg (132 lb 15 oz)    Height:  5' 2" (1.575 m) 5' 2" (1.575 m)      Const: Awake and alert, appears uncomfortable in bed  Head: Atraumatic  Eyes: Normal Conjunctiva  ENT: Normal External Ears, Nose and Mouth.  Neck: Full range of motion. No meningismus.  Resp: Normal respiratory effort, No distress  Cardio: Equal and intact distal pulses  Abd: Soft, mild diffuse tenderness to palpation, non distended.  No rebound or guarding.  Skin: No petechiae or rashes  Ext: No cyanosis, or edema  Neur: Awake and alert  Psych: Normal Mood and Affect    Data:  Results for orders placed or performed during the hospital encounter of 02/09/22   CBC W/ AUTO DIFFERENTIAL   Result Value Ref Range    WBC 8.97 3.90 - 12.70 K/uL    RBC 3.87 (L) 4.00 - 5.40 M/uL    Hemoglobin 12.6 12.0 - 16.0 g/dL    Hematocrit 38.8 37.0 - 48.5 %     (H) 82 - 98 fL    MCH 32.6 (H) 27.0 - 31.0 pg    MCHC 32.5 32.0 - 36.0 g/dL    RDW 13.5 11.5 - 14.5 %    Platelets 100 (L) 150 - 450 K/uL    MPV 10.8 9.2 - 12.9 fL    Immature Granulocytes 0.2 0.0 - 0.5 %    Gran # (ANC) 7.2 1.8 - 7.7 K/uL    Immature Grans (Abs) 0.02 0.00 - 0.04 K/uL    Lymph # 0.9 (L) 1.0 - 4.8 K/uL    Mono # 0.7 0.3 - 1.0 K/uL    Eos # 0.1 0.0 - 0.5 K/uL    Baso # 0.03 0.00 " - 0.20 K/uL    nRBC 0 0 /100 WBC    Gran % 80.2 (H) 38.0 - 73.0 %    Lymph % 9.9 (L) 18.0 - 48.0 %    Mono % 8.0 4.0 - 15.0 %    Eosinophil % 1.4 0.0 - 8.0 %    Basophil % 0.3 0.0 - 1.9 %    Differential Method Automated    Comp. Metabolic Panel   Result Value Ref Range    Sodium 131 (L) 136 - 145 mmol/L    Potassium 4.3 3.5 - 5.1 mmol/L    Chloride 98 95 - 110 mmol/L    CO2 25 23 - 29 mmol/L    Glucose 221 (H) 70 - 110 mg/dL    BUN 14 8 - 23 mg/dL    Creatinine 0.9 0.5 - 1.4 mg/dL    Calcium 10.3 8.7 - 10.5 mg/dL    Total Protein 7.9 6.0 - 8.4 g/dL    Albumin 4.0 3.5 - 5.2 g/dL    Total Bilirubin 2.8 (H) 0.1 - 1.0 mg/dL    Alkaline Phosphatase 117 55 - 135 U/L     (H) 10 - 40 U/L     (H) 10 - 44 U/L    Anion Gap 8 8 - 16 mmol/L    eGFR if African American >60.0 >60 mL/min/1.73 m^2    eGFR if non African American >60.0 >60 mL/min/1.73 m^2   Lipase   Result Value Ref Range    Lipase 7 4 - 60 U/L   Comprehensive metabolic panel   Result Value Ref Range    Sodium 132 (L) 136 - 145 mmol/L    Potassium 4.5 3.5 - 5.1 mmol/L    Chloride 99 95 - 110 mmol/L    CO2 27 23 - 29 mmol/L    Glucose 199 (H) 70 - 110 mg/dL    BUN 13 8 - 23 mg/dL    Creatinine 0.8 0.5 - 1.4 mg/dL    Calcium 9.0 8.7 - 10.5 mg/dL    Total Protein 6.5 6.0 - 8.4 g/dL    Albumin 3.2 (L) 3.5 - 5.2 g/dL    Total Bilirubin 1.8 (H) 0.1 - 1.0 mg/dL    Alkaline Phosphatase 96 55 - 135 U/L     (H) 10 - 40 U/L    ALT 82 (H) 10 - 44 U/L    Anion Gap 6 (L) 8 - 16 mmol/L    eGFR if African American >60.0 >60 mL/min/1.73 m^2    eGFR if non African American >60.0 >60 mL/min/1.73 m^2   CBC auto differential   Result Value Ref Range    WBC 6.03 3.90 - 12.70 K/uL    RBC 3.39 (L) 4.00 - 5.40 M/uL    Hemoglobin 11.0 (L) 12.0 - 16.0 g/dL    Hematocrit 33.7 (L) 37.0 - 48.5 %    MCV 99 (H) 82 - 98 fL    MCH 32.4 (H) 27.0 - 31.0 pg    MCHC 32.6 32.0 - 36.0 g/dL    RDW 13.5 11.5 - 14.5 %    Platelets 91 (L) 150 - 450 K/uL    MPV 10.5 9.2 - 12.9 fL     Immature Granulocytes 0.2 0.0 - 0.5 %    Gran # (ANC) 4.2 1.8 - 7.7 K/uL    Immature Grans (Abs) 0.01 0.00 - 0.04 K/uL    Lymph # 1.0 1.0 - 4.8 K/uL    Mono # 0.6 0.3 - 1.0 K/uL    Eos # 0.2 0.0 - 0.5 K/uL    Baso # 0.03 0.00 - 0.20 K/uL    nRBC 0 0 /100 WBC    Gran % 69.5 38.0 - 73.0 %    Lymph % 17.2 (L) 18.0 - 48.0 %    Mono % 10.1 4.0 - 15.0 %    Eosinophil % 2.5 0.0 - 8.0 %    Basophil % 0.5 0.0 - 1.9 %    Differential Method Automated    POCT COVID-19 Rapid Screening   Result Value Ref Range    POC Rapid COVID Negative Negative     Acceptable Yes    POCT glucose   Result Value Ref Range    POCT Glucose 191 (H) 70 - 110 mg/dL     *Note: Due to a large number of results and/or encounters for the requested time period, some results have not been displayed. A complete set of results can be found in Results Review.     Imaging Results           CT Abdomen Pelvis With Contrast (Final result)  Result time 02/10/22 02:29:13    Final result by Juan Pablo Sung MD (02/10/22 02:29:13)                 Impression:      Multiple distended loops of small bowel with air-fluid levels and possible transition point in the right upper quadrant, suggestive of partial small-bowel obstruction.  This appears to be at a site of bowel loops containing radiopaque sutures and may represent anastomotic stricture.    Postsurgical changes in the bowel, similar to prior.    Increased soft tissue about the cenliac axis, SMA and aortic bifurcation with scattered para-aortic nodes, similar to prior.  Etiology and significant remains unknown.  Correlate clinically for potential arteritis.    Additional findings as above.    This report was flagged in Epic as abnormal.    Electronically signed by resident: Zehra Gonzalez  Date:    02/10/2022  Time:    01:37    Electronically signed by: Juan Pablo Sung MD  Date:    02/10/2022  Time:    02:29             Narrative:    EXAMINATION:  CT ABDOMEN PELVIS WITH CONTRAST    CLINICAL  HISTORY:  abdominal pain, hx frequent SBO;    TECHNIQUE:  Low dose axial images, sagittal and coronal reformations were obtained from the lung bases to the pubic symphysis following the IV administration of 100 mL of Omnipaque 350.  Oral contrast was not administered.    COMPARISON:  CT enterography 01/26/2022.  CT abdomen pelvis 01/25/2022.    FINDINGS:  Heart: Normal size. No effusion.    Lung Bases: Bibasilar atelectasis.    Liver: No hepatomegaly.  Stable 0.6 cm enhancing lesion in the right hepatic dome (axial series 2, image 24), likely flash filling hemangioma.    Gallbladder: Surgically absent.    Bile Ducts: Prominent common bile duct, similar to prior exam and likely due to cholecystectomy status.  No intrahepatic biliary ductal dilatation.    Pancreas: No mass. No peripancreatic fat stranding.    Spleen: Unremarkable    Adrenals: Unremarkable    Kidneys/Ureters: Subcentimeter hypodensity in the right kidney, too small to characterize, similar to prior.  Mild right hydronephrosis, similar to prior exam.  Bilateral ureters normal course and caliber.    Bladder: No wall thickening.    Reproductive organs: Uterus is surgically absent.    GI Tract/Mesentery: Postsurgical changes in the bowel, similar to prior.  Multiple distended loops of small bowel measuring up to 3.4 cm with air-fluid levels with possible transition point in the right upper quadrant (axial series 2, image 71).  Appendix is not visualized.    Peritoneal Space: Mild fat stranding throughout the mesentery.    Retroperitoneum: Scattered periaortic nodes, similar to prior.    Abdominal wall: Surgical scar in the low anterior abdominal wall.    Vasculature: No aneurysm.  Calcific atherosclerosis of the descending aorta and its branches.    Increased soft tissue about the celiac axis, SMA and aortic bifurcation with scattered para-aortic nodes, similar to prior.    Bones: Severe degenerative changes in the lumbar spine.  No acute fracture. No  suspicious lytic or sclerotic lesions.                              Labs & Imaging studies were reviewed independently by me.    Medical Decision Makin-year-old female with history of multiple SBO due to adhesions and history of colon cancer in the past presenting to the emergency department with nausea, vomiting, abdominal pain, and obstipation, concern for SBO.  CT imaging was obtained, shows partial SBO with transition point in the right upper quadrant.  Patient was kept NPO.  Pain was controlled with IV narcotics.  General surgery was consulted, reports CT imaging similar to prior imaging during last hospitalization, recommends hospitalist admission for GI consultation for possible small-bowel throat follow through.  No signs of acute infection currently.  Laboratory studies unremarkable, showing chronic thrombocytopenia, no signs of active bleeding.  She does have slight elevation in her LFTs and T bili, though no CT imaging concerning for choledocholithiasis or gallstone pancreatitis.  Patient was admitted to hospitalist Medicine for further evaluation treatment.    Clinical Impression:  1. SBO (small bowel obstruction)    2. Thrombocytopenia    3. Non-intractable vomiting with nausea, unspecified vomiting type                 Awilda Davis MD  02/10/22 [id number]

## 2022-02-10 NOTE — PT/OT/SLP EVAL
Physical Therapy Evaluation and Tx    Patient Name:  Anayeli Judd   MRN:  [id number]  Admit Date: 2/9/2022  Admitting Diagnosis:  SBO (small bowel obstruction)  Length of Stay: 0 days  Recent Surgery: * No surgery found *      Recommendations:   Discharge Recommendations:  home health PT (resume)   Discharge Equipment Recommendations: none   Barriers to discharge: Decreased caregiver support      Assessment:     Anayeli Judd is a 77 y.o. female admitted with a medical diagnosis of SBO (small bowel obstruction).  Pt presents with functional mobility deficits but is functioning close to baseline. Required SPV for bed mobility and SBA-CGA for ambulation with no AD on this date. Pt is currently receiving HHPT/OT and plans to resume services upon DC. Pt will continue to benefit from acute PT services in order to maximize safety and (I) with functional mobility.  Recommending HHPT upon discharge to increase patient's independence and safety with functional mobility.     Problem List: weakness,impaired endurance,impaired self care skills,impaired functional mobilty,gait instability,impaired balance,decreased upper extremity function,decreased lower extremity function,impaired cardiopulmonary response to activity  Rehab Prognosis: Good; patient would benefit from acute skilled PT services to address these deficits and reach maximum level of function.        Plan:   During this hospitalization, patient to be seen 4 x/week to address the above listed problems via gait training,therapeutic activities,therapeutic exercises,neuromuscular re-education  · Plan of Care Expires:  03/10/22    Subjective     Chief Complaint: Abdominal Pain (Unsure last bm. Concern for SBO. Pt began vomiting today. Also on antibiotics for UTI. )    Patient/Family Comments/goals: to get better  Pain/Comfort:  · Pain Rating 1: 0/10  · Pain Rating Post-Intervention 1: 0/10    Social History:  Residence: lives alone in apartment with elevator  access  Support available: family, sitters and Pt reports that her 2 daughters check on her frequently and can assist whenever needed  Equipment Used: walker, rolling,wheelchair,shower chair,cane, straight,bedside commode,hospital bed,grab bar  Prior level of function: independent; uses rollator prn     Objective:     Communicated with RN prior to session.  Patient found HOB elevated upon PT entry to room.   Additional staff present: N/A    General Precautions: Standard, fall   Orthopedic Precautions:N/A   Braces: N/A   Oxygen Device: Room Air    Exams:  · Cognition:   · AxOx4  · Command following: Follows multistep verbal commands    · Postural Exam:  Patient presented with the following abnormalities:    · -       Rounded shoulders  · -       Forward head  · -       Posterior pelvic tilt  · Sensation:    · -       Intact    · RLE ROM: WFL  · RLE Strength: WFL  · LLE ROM: WFL  · LLE Strength: WFL    Functional Mobility:  Bed Mobility:     Rolling Right: supervision  Scooting: supervision  Supine to Sit: supervision  Sit to Supine: supervision  Transfers:     Sit to Stand:  stand by assistance with no AD  Gait:   · Distance: ~50 ft  · Level of Assistance:  stand by assistance and contact guard assistance  · AD used: no AD  · Gait Deviations: decreased speed, step length, swing through, heel strike, forward flexed posture, and downward gaze.   · Comments: PT providing verbal and tactile cues for improved upright posture, gaze direction, AD management, and gait fluidity.   Balance:   · Static Sitting: SPV  · Dynamic Sitting: SBA   · Static Standing: SBA  · Dynamic Standing: CGA    Therapeutic Activities & Exercises:      Role of PT in POC   Patient educated on the importance of early mobility to prevent functional decline during hospital stay   Patient was instructed to utilize staff assistance for mobility/transfers.   Patient was educated on PT POC and all questions answered within PT scope of  practice.   Patient able to verbalize understanding; will follow-up with pt during current admit for additional questions/concerns within scope of practice.     Outcome Measures:  AM-PAC 6 CLICK MOBILITY  Turning over in bed (including adjusting bedclothes, sheets and blankets)?: 3  Sitting down on and standing up from a chair with arms (e.g., wheelchair, bedside commode, etc.): 3  Moving from lying on back to sitting on the side of the bed?: 3  Moving to and from a bed to a chair (including a wheelchair)?: 3  Need to walk in hospital room?: 3  Climbing 3-5 steps with a railing?: 3  Basic Mobility Total Score: 18     Patient left HOB elevated with all lines intact, call button in reach and RN notified.    GOALS:   Multidisciplinary Problems     Physical Therapy Goals        Problem: Physical Therapy Goal    Goal Priority Disciplines Outcome Goal Variances Interventions   Physical Therapy Goal     PT, PT/OT Ongoing, Progressing     Description: Goals to be met by: 22     Patient will increase functional independence with mobility by performin. Supine to sit with [place]  2. Sit to supine with [place]  3. Sit to stand transfer with [place]  4. Gait  x 150 feet with Supervision using LRAD  5. Lower extremity exercise program x 20 reps per handout, with independence                       History:     Past Medical History:   Diagnosis Date    Abdominal pain     Allergic rhinitis     Arthritis     Blood platelet disorder     Blood platelet disorder     Blood transfusion     during delivery and     Bowel obstruction     Cervical radiculopathy     followed by dr cloud    Colon cancer     transverse colon; resected; Stage IIA (pT3 pN0 MX)    Diabetes mellitus     Diarrhea     Falls 2020    Family history of breast cancer     Family history of colon cancer     Fatty liver     GERD (gastroesophageal reflux disease)     History of shingles     Hyperlipidemia      Hypothyroidism     Irritable bowel syndrome     Microscopic colitis     treated     Raynaud phenomenon     Raynaud's disease     Type 2 diabetes mellitus        Past Surgical History:   Procedure Laterality Date    APPENDECTOMY      BACK SURGERY      CARPAL TUNNEL RELEASE      bilateral      SECTION      CHOLECYSTECTOMY  1965    COLECTOMY  2011    Transverse colon resection by Dr. Aguirre    COLONOSCOPY N/A 2017    Procedure: COLONOSCOPY;  Surgeon: Manjit Alvarez MD;  Location: [facility] (4TH FLR);  Service: Endoscopy;  Laterality: N/A;    COLONOSCOPY N/A 2019    Procedure: COLONOSCOPY;  Surgeon: Ramiro Jefferson MD;  Location: [facility] (4TH FLR);  Service: Endoscopy;  Laterality: N/A;  PM prep    CYSTOSCOPY N/A 2021    Procedure: CYSTOSCOPY;  Surgeon: Viridiana Valenzuela MD;  Location: [facility] OR 1ST FLR;  Service: Urology;  Laterality: N/A;    ENDOSCOPIC ULTRASOUND OF UPPER GASTROINTESTINAL TRACT N/A 2018    Procedure: ULTRASOUND, UPPER GI TRACT, ENDOSCOPIC;  Surgeon: Jose Hess MD;  Location: [facility];  Service: Endoscopy;  Laterality: N/A;    ENDOSCOPIC ULTRASOUND OF UPPER GASTROINTESTINAL TRACT N/A 2019    Procedure: ULTRASOUND, UPPER GI TRACT, ENDOSCOPIC;  Surgeon: Jose Hess MD;  Location: [facility] (2ND FLR);  Service: Endoscopy;  Laterality: N/A;    ESOPHAGOGASTRODUODENOSCOPY N/A 2018    Procedure: EGD (ESOPHAGOGASTRODUODENOSCOPY);  Surgeon: Angelo Reynolds MD;  Location: [facility] (2ND FLR);  Service: Endoscopy;  Laterality: N/A;    ESOPHAGOGASTRODUODENOSCOPY N/A 2019    Procedure: EGD (ESOPHAGOGASTRODUODENOSCOPY);  Surgeon: Ramiro Jefferson MD;  Location: [facility] (4TH FLR);  Service: Endoscopy;  Laterality: N/A;    EYE SURGERY      Cataract Removal    FLUOROSCOPIC URODYNAMIC STUDY N/A 2021    Procedure: URODYNAMIC STUDY, FLUOROSCOPIC;  Surgeon: Viridiana Valenzuela MD;  Location: [facility] OR 1ST FLR;  Service:  Urology;  Laterality: N/A;  90 minutes     HYSTERECTOMY      posterolateral fusion with autograft bone and Eun mineralized bone matrix  2/1/13    at [facility] for lumbar spine stenosis    TOE SURGERY      TONSILLECTOMY      TRIGGER FINGER RELEASE         Family History   Problem Relation Age of Onset    Heart disease Father 50        Mi age 50    Colon cancer Father     Bladder Cancer Mother         non smoker    Cataracts Mother     Glaucoma Mother     Heart disease Mother     Hyperlipidemia Mother     Kidney disease Mother     Breast cancer Sister 79    Arthritis Daughter     Asthma Daughter     Depression Daughter     Hypertension Daughter     Stroke Daughter 40    Arthritis Brother     Colon cancer Brother 70    Alcohol abuse Brother     Parkinsonism Brother     Alcohol abuse Brother     Depression Daughter     Celiac disease Neg Hx     Cirrhosis Neg Hx     Colon polyps Neg Hx     Crohn's disease Neg Hx     Cystic fibrosis Neg Hx     Esophageal cancer Neg Hx     Hemochromatosis Neg Hx     Inflammatory bowel disease Neg Hx     Irritable bowel syndrome Neg Hx     Liver cancer Neg Hx     Liver disease Neg Hx     Rectal cancer Neg Hx     Stomach cancer Neg Hx     Ulcerative colitis Neg Hx     Eliazar's disease Neg Hx     Amblyopia Neg Hx     Blindness Neg Hx     Macular degeneration Neg Hx     Retinal detachment Neg Hx     Strabismus Neg Hx     Melanoma Neg Hx        Social History     Socioeconomic History    Marital status:    Tobacco Use    Smoking status: Never Smoker    Smokeless tobacco: Never Used   Substance and Sexual Activity    Alcohol use: Yes     Comment: socially, hardly ever    Drug use: No    Sexual activity: Not Currently   Social History Narrative    , lives alone.  Primary support are her children and friends.     Time Tracking:     PT Received On: 02/10/22  PT Start Time: 1213     PT Stop Time: 1235  PT Total Time (min): 22 min      Billable Minutes: Evaluation 10 and Therapeutic Activity 12    2/10/2022

## 2022-02-10 NOTE — CONSULTS
"General Surgery Note    Patient known to general surgery service - has had multiple similar presentations for abdominal pain (5 times since November). Has what appears to be chronic small bowel dilation on the last several CT scans. Was most recently admitted to medicine and managed with GI. Each admission for abdominal pain/"SBO" has been managed conservatively. She has a small bowel movement yesterday and continues to tolerated some PO intake; though does report some nausea. She was seen in outpatient clinic on 2/8 and is scheduled for elective ex-lap with lysis of adhesions on 3/4 pending medical clearance.     WBC wnl  LFTs elevated more than prior   T bili 2.8 from prior 1.5-1.9 range and AST/ALT have been increasing over the past several ED visits  U/A with 3+ leukocytes    CT scan reviewed without any significant change from prior scan     Given above recommend admission to medicine for management of abdominal pain/IBS, UTI and workup of rising LFTs   Could perform SBFT if worried about new obstructive process  Ideally needs to have outpatient clearance for elective surgery next month  Please call general surgery for any additional questions concerns    Naomy Montgomery MD  PGY- 5 General Surgery   [phone]      "

## 2022-02-10 NOTE — PLAN OF CARE
[facility]  Initial Discharge Assessment       Primary Care Provider: Darci Guthrie MD    Admission Diagnosis: SBO (small bowel obstruction) [K56.609]    Admission Date: 2/9/2022  Expected Discharge Date: 2/14/2022    Discharge Barriers Identified: None    Payor: MEDICARE / Plan: MEDICARE PART A & B / Product Type: Government /     Extended Emergency Contact Information  Primary Emergency Contact: Danielle Whitley   [place]  Mobile Phone: [phone]  Relation: Daughter  Secondary Emergency Contact: Kimberly Horner  Address: [address] [address] [facility]  Home Phone: [phone]  Relation: Daughter    Discharge Plan A: Home Health  Discharge Plan B: Home      [id number] - [address] [address]  Phone: [phone] Fax: [phone]    [facility] - [address] [address]  Phone: [phone] Fax: [phone]      Initial Assessment (most recent)     Adult Discharge Assessment - 02/10/22 1323        Discharge Assessment    Assessment Type Discharge Planning Assessment     Confirmed/corrected address, phone number and insurance Yes     Confirmed Demographics Correct on Facesheet     Source of Information patient     Reason For Admission SBO     Lives With alone   Pt has sitters who assist with ADL's.    Do you expect to return to your current living situation? Yes     Do you have help at home or someone to help you manage your care at home? Yes     Who are your caregiver(s) and their phone number(s)? Danielle Waters (daughter) [phone]     Prior to hospitilization cognitive status: Alert/Oriented     Current cognitive status: Alert/Oriented     Walking or Climbing Stairs Difficulty ambulation difficulty, requires equipment     Equipment  Currently Used at Home walker, rolling;wheelchair;cane, straight     Readmission within 30 days? Yes   [place] [facility] [place].    Patient currently being followed by outpatient case management? No     Do you currently have service(s) that help you manage your care at home? Yes     Name and Contact number of agency [facility]     Is the pt/caregiver preference to resume services with current agency Yes     Do you take prescription medications? Yes     Do you have prescription coverage? Yes     Do you have any problems affording any of your prescribed medications? No     Is the patient taking medications as prescribed? yes     How do you get to doctors appointments? family or friend will provide     Are you on dialysis? No     Do you take coumadin? No     Discharge Plan A Home Health     Discharge Plan B Home     DME Needed Upon Discharge  other (see comments)   TBD    Discharge Plan discussed with: Patient     Discharge Barriers Identified None                  Felicitas Cazares RN  Ext [phone]

## 2022-02-10 NOTE — PHARMACY MED REC
"Admission Medication History     The home medication history was taken by Oneil Stewrat.    You may go to "Admission" then "Reconcile Home Medications" tabs to review and/or act upon these items.      The home medication list has been updated by the Pharmacy department.    Please read ALL comments highlighted in yellow.    Please address this information as you see fit.     Feel free to contact us if you have any questions or require assistance.      The medications listed below were removed from the home medication list. Please reorder if appropriate:  Patient reports no longer taking the following medication(s):   LOTRISONE CRM   DICYCLOMINE 20 MG   LACTOBACILLUS CAPS    Medications listed below were obtained from: Patient/family  PTA Medications   Medication Sig    acetaminophen (TYLENOL) 650 MG TbSR   Take 650 mg by mouth once as needed (pain).    ascorbic acid, vitamin C, (VITAMIN C) 1000 MG tablet   Take 1,000 mg by mouth once daily.    atorvastatin (LIPITOR) 40 MG tablet   TAKE 1 TABLET BY MOUTH  DAILY    azelastine (ASTELIN) 137 mcg (0.1 %) nasal spray   2 sprays (274 mcg total) by Nasal route 2 (two) times daily as needed for Rhinitis.    biotin 10,000 mcg TbDL   Take 1 tablet by mouth once daily.     butalbital-acetaminophen-caffeine -40 mg (FIORICET, ESGIC) -40 mg per tablet   1 tablet every 8 (eight) hours as needed for Pain.    cholestyramine-aspartame (CHOLESTYRAMINE LIGHT) 4 gram PwPk   Take 1 packet (4 g total) by mouth daily as needed (Diarrhea).    co-enzyme Q-10 30 mg capsule   Take 30 mg by mouth 3 (three) times daily.    conjugated estrogens (PREMARIN) vaginal cream   INSERT 1/2 GRAM VAGINALLY  TWICE WEEKLY    cranberry extract 200 mg Cap   Take 1 capsule by mouth once daily.    D-MANNOSE ORAL   Take 1 tablet by mouth once daily.     DULoxetine (CYMBALTA) 30 MG capsule   Take 1 capsule (30 mg total) by mouth 2 (two) times daily.    fluticasone propionate (FLONASE " ALLERGY RELIEF) 50 mcg/actuation nasal spray   1 spray by Each Nostril route daily as needed for Rhinitis.    hydrocortisone 2.5 % cream   Apply topically 2 (two) times daily as needed.    levothyroxine (SYNTHROID) 75 MCG tablet   Take 1 tablet (75 mcg total) by mouth before breakfast.    LIDOcaine (LIDODERM) 5 % Patch   Place 1 patch onto the skin once daily. Remove & Discard patch within 12 hours or as directed by MD    magnesium 250 mg Tab   Take 2 capsules by mouth once daily.    mirabegron (MYRBETRIQ) 50 mg Tb24   Take 1 tablet by mouth once daily.    montelukast (SINGULAIR) 10 mg tablet   TAKE 1 TABLET BY MOUTH  DAILY    omega 3-dha-epa-fish oil 1,200 (144-216) mg Cap   Take 1 capsule by mouth once daily.     ondansetron (ZOFRAN-ODT) 8 MG TbDL   Take 1 tablet (8 mg total) by mouth every 8 (eight) hours as needed (nausea).    pantoprazole (PROTONIX) 20 MG tablet   Take 1 tablet (20 mg total) by mouth once daily.    polyethylene glycol (GLYCOLAX) 17 gram/dose powder   Take 17 g by mouth before dinner.    promethazine-codeine 6.25-10 mg/5 ml (PHENERGAN WITH CODEINE) 6.25-10 mg/5 mL syrup   Take 5 mLs by mouth every 4 to 6 hours as needed for Cough.    psyllium husk, aspartame, (METAMUCIL) 3.4 gram PwPk packet   Take 1 packet by mouth 2 (two) times daily. (Patient taking differently: Take 1 packet by mouth 2 (two) times daily as needed (constipation).)    senna-docusate 8.6-50 mg (PERICOLACE) 8.6-50 mg per tablet   Take 1 tablet by mouth 2 (two) times daily. (Patient taking differently: Take 1 tablet by mouth 2 (two) times daily as needed for Constipation.)    simethicone (MYLICON) 80 MG chewable tablet   Take 1 tablet (80 mg total) by mouth every 6 (six) hours as needed for Flatulence.    valACYclovir (VALTREX) 500 MG tablet   Take 1 tablet (500 mg total) by mouth 3 (three) times daily.    vitamin D 1000 units Tab   Take 1,000 Units by mouth once daily. D3    flash glucose sensor (FREESTYLE EFREN  14 DAY SENSOR) Kit   1 application by Misc.(Non-Drug; Combo Route) route every 14 (fourteen) days. Disp 30 or 90 day refill    FREESTYLE EFREN 10 DAY READER Misc   TEST as directed    FREESTYLE LITE STRIPS Strp   TEST DAILY AS DIRECTED    HYDROcodone-acetaminophen (NORCO) 5-325 mg per tablet   Take 1 tablet by mouth every 12 (twelve) hours as needed for Pain.    hyoscyamine (ANASPAZ,LEVSIN) 0.125 mg Tab   Take 1 tablet (125 mcg total) by mouth every 8 (eight) hours as needed (urgency).    lactulose (CHRONULAC) 10 gram/15 mL solution   Take 30 mLs (20 g total) by mouth once daily.    nitrofurantoin, macrocrystal-monohydrate, (MACROBID) 100 MG capsule Take 1 capsule (100 mg total) by mouth 2 (two) times daily. for 7 days       Oneil Stewart CPhT  EXT [id number]                .

## 2022-02-10 NOTE — HPI
Anayeli Judd is a 77F with hypothyroid, anxiety, T2DM, fatty liver, hx of colon cancer, and recurrent SBO who presented for abdominal pain of multiple day duration which has been worsening and is associated with constipation and vomiting. She reports small Bms with taking Miralax and Metamucil. Tolerates PO intake though associated with mild nausea. Of note, she was recently started on antibiotics for a UTI by her PCP. She has had similar abdominal pain episodes (5 presentations since Nov) for SBO. She was recently seen in Gen Surg outpt clinic and has elective ex-lap scheduled 3/4 for lysis of adhesions.    In the ED, VSS. Labs notable  for hyponatremia, elevated bilirubin, elevated LFTs, elevated glucose 221, anemia, and thrombocytopenia. UA concerning for UTI. CT A/P w/ contrast positive for partial SBO at similar site as prior episodes and scheduled surgery. Patient was admitted to hospital medicine for further management.

## 2022-02-11 PROBLEM — R74.8 ELEVATED LIVER ENZYMES: Status: ACTIVE | Noted: 2022-02-11

## 2022-02-11 LAB
ALBUMIN SERPL BCP-MCNC: 2.8 G/DL (ref 3.5–5.2)
ALP SERPL-CCNC: 80 U/L (ref 55–135)
ALT SERPL W/O P-5'-P-CCNC: 51 U/L (ref 10–44)
ANION GAP SERPL CALC-SCNC: 5 MMOL/L (ref 8–16)
AST SERPL-CCNC: 47 U/L (ref 10–40)
BACTERIA UR CULT: ABNORMAL
BASOPHILS # BLD AUTO: 0.02 K/UL (ref 0–0.2)
BASOPHILS # BLD AUTO: 0.02 K/UL (ref 0–0.2)
BASOPHILS NFR BLD: 0.5 % (ref 0–1.9)
BASOPHILS NFR BLD: 0.5 % (ref 0–1.9)
BILIRUB SERPL-MCNC: 1.3 MG/DL (ref 0.1–1)
BUN SERPL-MCNC: 8 MG/DL (ref 8–23)
CALCIUM SERPL-MCNC: 9.1 MG/DL (ref 8.7–10.5)
CHLORIDE SERPL-SCNC: 105 MMOL/L (ref 95–110)
CO2 SERPL-SCNC: 26 MMOL/L (ref 23–29)
CREAT SERPL-MCNC: 0.8 MG/DL (ref 0.5–1.4)
DIFFERENTIAL METHOD: ABNORMAL
DIFFERENTIAL METHOD: ABNORMAL
EOSINOPHIL # BLD AUTO: 0.2 K/UL (ref 0–0.5)
EOSINOPHIL # BLD AUTO: 0.2 K/UL (ref 0–0.5)
EOSINOPHIL NFR BLD: 5.3 % (ref 0–8)
EOSINOPHIL NFR BLD: 5.8 % (ref 0–8)
ERYTHROCYTE [DISTWIDTH] IN BLOOD BY AUTOMATED COUNT: 13.3 % (ref 11.5–14.5)
ERYTHROCYTE [DISTWIDTH] IN BLOOD BY AUTOMATED COUNT: 13.3 % (ref 11.5–14.5)
EST. GFR  (AFRICAN AMERICAN): >60 ML/MIN/1.73 M^2
EST. GFR  (NON AFRICAN AMERICAN): >60 ML/MIN/1.73 M^2
FIBRINOGEN PPP-MCNC: 389 MG/DL (ref 182–400)
GLUCOSE FINGERSTICK: 147
GLUCOSE FINGERSTICK: 154
GLUCOSE SERPL-MCNC: 148 MG/DL (ref 70–110)
HCT VFR BLD AUTO: 32.2 % (ref 37–48.5)
HCT VFR BLD AUTO: 33.2 % (ref 37–48.5)
HGB BLD-MCNC: 10.6 G/DL (ref 12–16)
HGB BLD-MCNC: 9.9 G/DL (ref 12–16)
IMM GRANULOCYTES # BLD AUTO: 0 K/UL (ref 0–0.04)
IMM GRANULOCYTES # BLD AUTO: 0.01 K/UL (ref 0–0.04)
IMM GRANULOCYTES NFR BLD AUTO: 0 % (ref 0–0.5)
IMM GRANULOCYTES NFR BLD AUTO: 0.3 % (ref 0–0.5)
INR PPP: 1.1 (ref 0.8–1.2)
LYMPHOCYTES # BLD AUTO: 1 K/UL (ref 1–4.8)
LYMPHOCYTES # BLD AUTO: 1.2 K/UL (ref 1–4.8)
LYMPHOCYTES NFR BLD: 25.4 % (ref 18–48)
LYMPHOCYTES NFR BLD: 30.7 % (ref 18–48)
MCH RBC QN AUTO: 32.5 PG (ref 27–31)
MCH RBC QN AUTO: 32.7 PG (ref 27–31)
MCHC RBC AUTO-ENTMCNC: 30.7 G/DL (ref 32–36)
MCHC RBC AUTO-ENTMCNC: 31.9 G/DL (ref 32–36)
MCV RBC AUTO: 103 FL (ref 82–98)
MCV RBC AUTO: 106 FL (ref 82–98)
MONOCYTES # BLD AUTO: 0.4 K/UL (ref 0.3–1)
MONOCYTES # BLD AUTO: 0.5 K/UL (ref 0.3–1)
MONOCYTES NFR BLD: 11.4 % (ref 4–15)
MONOCYTES NFR BLD: 12.3 % (ref 4–15)
NEUTROPHILS # BLD AUTO: 2 K/UL (ref 1.8–7.7)
NEUTROPHILS # BLD AUTO: 2.2 K/UL (ref 1.8–7.7)
NEUTROPHILS NFR BLD: 50.7 % (ref 38–73)
NEUTROPHILS NFR BLD: 57.1 % (ref 38–73)
NRBC BLD-RTO: 0 /100 WBC
NRBC BLD-RTO: 0 /100 WBC
PATH REV BLD -IMP: NORMAL
PLATELET # BLD AUTO: 59 K/UL (ref 150–450)
PLATELET # BLD AUTO: 72 K/UL (ref 150–450)
PMV BLD AUTO: 10.1 FL (ref 9.2–12.9)
PMV BLD AUTO: 10.9 FL (ref 9.2–12.9)
POCT GLUCOSE: 124 MG/DL (ref 70–110)
POCT GLUCOSE: 177 MG/DL (ref 70–110)
POTASSIUM SERPL-SCNC: 3.8 MMOL/L (ref 3.5–5.1)
PROT SERPL-MCNC: 5.5 G/DL (ref 6–8.4)
PROTHROMBIN TIME: 11.4 SEC (ref 9–12.5)
RBC # BLD AUTO: 3.05 M/UL (ref 4–5.4)
RBC # BLD AUTO: 3.24 M/UL (ref 4–5.4)
SODIUM SERPL-SCNC: 136 MMOL/L (ref 136–145)
WBC # BLD AUTO: 3.78 K/UL (ref 3.9–12.7)
WBC # BLD AUTO: 3.97 K/UL (ref 3.9–12.7)

## 2022-02-11 PROCEDURE — 85610 PROTHROMBIN TIME: CPT | Performed by: STUDENT IN AN ORGANIZED HEALTH CARE EDUCATION/TRAINING PROGRAM

## 2022-02-11 PROCEDURE — 94761 N-INVAS EAR/PLS OXIMETRY MLT: CPT

## 2022-02-11 PROCEDURE — 85025 COMPLETE CBC W/AUTO DIFF WBC: CPT | Mod: 91 | Performed by: STUDENT IN AN ORGANIZED HEALTH CARE EDUCATION/TRAINING PROGRAM

## 2022-02-11 PROCEDURE — 85025 COMPLETE CBC W/AUTO DIFF WBC: CPT

## 2022-02-11 PROCEDURE — 80053 COMPREHEN METABOLIC PANEL: CPT

## 2022-02-11 PROCEDURE — 25000003 PHARM REV CODE 250: Performed by: STUDENT IN AN ORGANIZED HEALTH CARE EDUCATION/TRAINING PROGRAM

## 2022-02-11 PROCEDURE — 25000003 PHARM REV CODE 250

## 2022-02-11 PROCEDURE — 27000221 HC OXYGEN, UP TO 24 HOURS

## 2022-02-11 PROCEDURE — 25500020 PHARM REV CODE 255: Performed by: HOSPITALIST

## 2022-02-11 PROCEDURE — 94799 UNLISTED PULMONARY SVC/PX: CPT

## 2022-02-11 PROCEDURE — 36415 COLL VENOUS BLD VENIPUNCTURE: CPT | Performed by: STUDENT IN AN ORGANIZED HEALTH CARE EDUCATION/TRAINING PROGRAM

## 2022-02-11 PROCEDURE — 63600175 PHARM REV CODE 636 W HCPCS

## 2022-02-11 PROCEDURE — 85060 BLOOD SMEAR INTERPRETATION: CPT | Mod: ,,, | Performed by: PATHOLOGY

## 2022-02-11 PROCEDURE — 99233 SBSQ HOSP IP/OBS HIGH 50: CPT | Mod: ,,, | Performed by: HOSPITALIST

## 2022-02-11 PROCEDURE — 85384 FIBRINOGEN ACTIVITY: CPT | Performed by: STUDENT IN AN ORGANIZED HEALTH CARE EDUCATION/TRAINING PROGRAM

## 2022-02-11 PROCEDURE — 85060 PATHOLOGIST REVIEW: ICD-10-PCS | Mod: ,,, | Performed by: PATHOLOGY

## 2022-02-11 PROCEDURE — 63600175 PHARM REV CODE 636 W HCPCS: Performed by: STUDENT IN AN ORGANIZED HEALTH CARE EDUCATION/TRAINING PROGRAM

## 2022-02-11 PROCEDURE — 99233 PR SUBSEQUENT HOSPITAL CARE,LEVL III: ICD-10-PCS | Mod: ,,, | Performed by: HOSPITALIST

## 2022-02-11 PROCEDURE — 20600001 HC STEP DOWN PRIVATE ROOM

## 2022-02-11 PROCEDURE — 36415 COLL VENOUS BLD VENIPUNCTURE: CPT

## 2022-02-11 RX ORDER — CIPROFLOXACIN 500 MG/1
500 TABLET ORAL EVERY 12 HOURS
Status: DISCONTINUED | OUTPATIENT
Start: 2022-02-11 | End: 2022-02-12 | Stop reason: HOSPADM

## 2022-02-11 RX ORDER — INSULIN ASPART 100 [IU]/ML
0-5 INJECTION, SOLUTION INTRAVENOUS; SUBCUTANEOUS
Status: DISCONTINUED | OUTPATIENT
Start: 2022-02-11 | End: 2022-02-11

## 2022-02-11 RX ORDER — GLUCAGON 1 MG
1 KIT INJECTION
Status: DISCONTINUED | OUTPATIENT
Start: 2022-02-11 | End: 2022-02-12 | Stop reason: HOSPADM

## 2022-02-11 RX ORDER — INSULIN ASPART 100 [IU]/ML
0-5 INJECTION, SOLUTION INTRAVENOUS; SUBCUTANEOUS
Status: DISCONTINUED | OUTPATIENT
Start: 2022-02-12 | End: 2022-02-11

## 2022-02-11 RX ORDER — INSULIN ASPART 100 [IU]/ML
0-5 INJECTION, SOLUTION INTRAVENOUS; SUBCUTANEOUS
Status: DISCONTINUED | OUTPATIENT
Start: 2022-02-11 | End: 2022-02-12 | Stop reason: HOSPADM

## 2022-02-11 RX ORDER — MELOXICAM 15 MG/1
15 TABLET ORAL DAILY PRN
Status: DISCONTINUED | OUTPATIENT
Start: 2022-02-11 | End: 2022-02-12 | Stop reason: HOSPADM

## 2022-02-11 RX ORDER — MORPHINE SULFATE 2 MG/ML
2 INJECTION, SOLUTION INTRAMUSCULAR; INTRAVENOUS EVERY 6 HOURS PRN
Status: DISCONTINUED | OUTPATIENT
Start: 2022-02-11 | End: 2022-02-12 | Stop reason: HOSPADM

## 2022-02-11 RX ORDER — IBUPROFEN 200 MG
16 TABLET ORAL
Status: DISCONTINUED | OUTPATIENT
Start: 2022-02-11 | End: 2022-02-12 | Stop reason: HOSPADM

## 2022-02-11 RX ORDER — CIPROFLOXACIN 500 MG/1
500 TABLET ORAL EVERY 24 HOURS
Status: DISCONTINUED | OUTPATIENT
Start: 2022-02-11 | End: 2022-02-11

## 2022-02-11 RX ORDER — IBUPROFEN 200 MG
24 TABLET ORAL
Status: DISCONTINUED | OUTPATIENT
Start: 2022-02-11 | End: 2022-02-12 | Stop reason: HOSPADM

## 2022-02-11 RX ADMIN — IOHEXOL 200 ML: 350 INJECTION, SOLUTION INTRAVENOUS at 08:02

## 2022-02-11 RX ADMIN — LEVOTHYROXINE SODIUM 75 MCG: 75 TABLET ORAL at 05:02

## 2022-02-11 RX ADMIN — LIDOCAINE 1 PATCH: 50 PATCH CUTANEOUS at 11:02

## 2022-02-11 RX ADMIN — ATORVASTATIN CALCIUM 40 MG: 20 TABLET, FILM COATED ORAL at 11:02

## 2022-02-11 RX ADMIN — MORPHINE SULFATE 2 MG: 2 INJECTION, SOLUTION INTRAMUSCULAR; INTRAVENOUS at 11:02

## 2022-02-11 RX ADMIN — PIPERACILLIN AND TAZOBACTAM 4.5 G: 4; .5 INJECTION, POWDER, LYOPHILIZED, FOR SOLUTION INTRAVENOUS; PARENTERAL at 01:02

## 2022-02-11 RX ADMIN — CIPROFLOXACIN 500 MG: 500 TABLET, FILM COATED ORAL at 08:02

## 2022-02-11 RX ADMIN — MONTELUKAST 10 MG: 10 TABLET, FILM COATED ORAL at 11:02

## 2022-02-11 RX ADMIN — DULOXETINE 30 MG: 30 CAPSULE, DELAYED RELEASE ORAL at 08:02

## 2022-02-11 RX ADMIN — PIPERACILLIN AND TAZOBACTAM 4.5 G: 4; .5 INJECTION, POWDER, LYOPHILIZED, FOR SOLUTION INTRAVENOUS; PARENTERAL at 11:02

## 2022-02-11 RX ADMIN — Medication 500 MG: at 11:02

## 2022-02-11 RX ADMIN — DULOXETINE 30 MG: 30 CAPSULE, DELAYED RELEASE ORAL at 11:02

## 2022-02-11 NOTE — PLAN OF CARE
Patient progressing towards discharge.    Patient had no acute events noted. Patient currently NPO after midnight for GI procedure later today.  Discussed POC with patient and family with verbalization of understanding.    Will continue to monitor.

## 2022-02-11 NOTE — SUBJECTIVE & OBJECTIVE
Interval History: had BM overnight. Tolerating diet       Review of Systems   Constitutional: Positive for appetite change and fatigue. Negative for fever.   HENT: Negative for rhinorrhea and trouble swallowing.    Eyes: Positive for photophobia. Negative for visual disturbance.   Respiratory: Negative for cough and shortness of breath.    Cardiovascular: Negative for chest pain and palpitations.   Gastrointestinal: Positive for abdominal pain, nausea and vomiting. Negative for abdominal distention, blood in stool and constipation.   Endocrine: Negative for polyphagia and polyuria.   Genitourinary: Positive for dysuria and urgency.   Musculoskeletal: Positive for arthralgias and back pain.   Skin: Positive for rash. Negative for wound.   Neurological: Negative for light-headedness and headaches.   Psychiatric/Behavioral: Negative for agitation and confusion.     Objective:     Vital Signs (Most Recent):  Temp: 97.4 °F (36.3 °C) (02/11/22 1131)  Pulse: 83 (02/11/22 1300)  Resp: 16 (02/11/22 1300)  BP: (!) 118/59 (02/11/22 1300)  SpO2: 99 % (02/11/22 1131) Vital Signs (24h Range):  Temp:  [97.4 °F (36.3 °C)-98.3 °F (36.8 °C)] 97.4 °F (36.3 °C)  Pulse:  [60-83] 83  Resp:  [16-20] 16  SpO2:  [88 %-100 %] 99 %  BP: ()/(40-61) 118/59     Weight: 60.3 kg (132 lb 15 oz)  Body mass index is 24.31 kg/m².    Intake/Output Summary (Last 24 hours) at 2/11/2022 1352  Last data filed at 2/11/2022 0600  Gross per 24 hour   Intake 2574.56 ml   Output 700 ml   Net 1874.56 ml      Physical Exam  Constitutional:       Appearance: Normal appearance. She is normal weight. She is not ill-appearing or toxic-appearing.   HENT:      Head: Normocephalic and atraumatic.      Right Ear: External ear normal.      Left Ear: External ear normal.      Nose: Nose normal.      Mouth/Throat:      Mouth: Mucous membranes are dry.      Pharynx: Oropharynx is clear. No oropharyngeal exudate.   Eyes:      General: No scleral icterus.     Extraocular  Movements: Extraocular movements intact.      Conjunctiva/sclera: Conjunctivae normal.   Cardiovascular:      Rate and Rhythm: Regular rhythm. Tachycardia present.      Pulses: Normal pulses.      Heart sounds: Normal heart sounds. No murmur heard.      Pulmonary:      Effort: Pulmonary effort is normal. No respiratory distress.      Breath sounds: Normal breath sounds.   Abdominal:      General: Abdomen is flat. Bowel sounds are normal. There is no distension.      Tenderness: There is abdominal tenderness (Improved ). There is no guarding.   Musculoskeletal:         General: No swelling. Normal range of motion.      Cervical back: Normal range of motion and neck supple. No rigidity or tenderness.      Right lower leg: No edema.      Left lower leg: No edema.   Skin:     General: Skin is warm and dry.      Capillary Refill: Capillary refill takes 2 to 3 seconds.      Findings: Rash present.   Neurological:      General: No focal deficit present.      Mental Status: She is alert and oriented to person, place, and time. Mental status is at baseline.   Psychiatric:         Mood and Affect: Mood normal.         Behavior: Behavior normal.         Thought Content: Thought content normal.         Judgment: Judgment normal.         Significant Labs: All pertinent labs within the past 24 hours have been reviewed.    Significant Imaging: I have reviewed all pertinent imaging results/findings within the past 24 hours.

## 2022-02-11 NOTE — HOSPITAL COURSE
Admitted for concerns of SBO. Surgery consulted, recommended small bowel follow through, which showed mildly prominent loops of small bowel with normal small bowel transit time.  No evidence of high-grade obstruction.  Findings are similar compared to 01/14/2022. Liver enzymes slightly elevated, Liver US showed heterogenous hepatic parenchymal echogenicity, nonspecific, although may be seen in steatosis. Noted thrombocytopenia, on chart review followed by Heme/Onc for myelodysplastic syndrome. Ordered peripheral smear (pending at discharge). PT/INR and fibrinogen WNL. Tolerated advancement of diet without nausea/vomiting or recurrent abdominal pain. Urine culture grew Proteus. Discharged home with home health on ciprofloxacin to complete antibiotic course for UTI. To follow up with General Surgery as scheduled for procedure on 3/4/22.   Universal Safety Interventions

## 2022-02-11 NOTE — PT/OT/SLP PROGRESS
Physical Therapy      Patient Name:  Anayeli Judd   MRN:  [id number]    Patient not seen today secondary to  (Patient politely declined. Eating lunch; reports has been transferring to the bedside commode as needed, unable to ambulate d/t concern of urgent need for toileting.) Unable to return for second attempt. Will follow-up next scheduled day..

## 2022-02-11 NOTE — PROGRESS NOTES
[facility] - Oncology ([facility])  Hospital Medicine  Progress Note    Patient Name: Anayeli Judd  MRN: [id number]  Patient Class: IP- Inpatient   Admission Date: 2/9/2022  Length of Stay: 1 days  Attending Physician: Katie Nicole MD  Primary Care Provider: Darci Guthrie MD        Subjective:     Principal Problem:SBO (small bowel obstruction)        HPI:  Anayeli Judd is a 77F with hypothyroid, anxiety, T2DM, fatty liver, hx of colon cancer, and recurrent SBO who presented for abdominal pain of multiple day duration which has been worsening and is associated with constipation and vomiting. She reports small Bms with taking Miralax and Metamucil. Tolerates PO intake though associated with mild nausea. Of note, she was recently started on antibiotics for a UTI by her PCP. She has had similar abdominal pain episodes (5 presentations since Nov) for SBO. She was recently seen in Gen Surg outpt clinic and has elective ex-lap scheduled 3/4 for lysis of adhesions.    In the ED, VSS. Labs notable  for hyponatremia, elevated bilirubin, elevated LFTs, elevated glucose 221, anemia, and thrombocytopenia. UA concerning for UTI. CT A/P w/ contrast positive for partial SBO at similar site as prior episodes and scheduled surgery. Patient was admitted to hospital medicine for further management.      Overview/Hospital Course:  Admitted for concerns of SBO. Surgery consulted, Follow through done showed Mildly prominent loops of small bowel with normal small bowel transit time.  No evidence of high-grade obstruction.  Findings are similar compared to 01/14/2022. Liver enzymes slightly elevated, US done Heterogenous hepatic parenchymal echogenicity, nonspecific, although may be seen in steatosis. Noted thrombocytopenia, receiving hem note from outpatient showed MDA, work up of peripheral smear, PT/INR fibrinogen ordered. Advance diet as tolerated. Await U cx result.      Interval History: had BM overnight. Tolerating diet        Review of Systems   Constitutional: Positive for appetite change and fatigue. Negative for fever.   HENT: Negative for rhinorrhea and trouble swallowing.    Eyes: Positive for photophobia. Negative for visual disturbance.   Respiratory: Negative for cough and shortness of breath.    Cardiovascular: Negative for chest pain and palpitations.   Gastrointestinal: Positive for abdominal pain, nausea and vomiting. Negative for abdominal distention, blood in stool and constipation.   Endocrine: Negative for polyphagia and polyuria.   Genitourinary: Positive for dysuria and urgency.   Musculoskeletal: Positive for arthralgias and back pain.   Skin: Positive for rash. Negative for wound.   Neurological: Negative for light-headedness and headaches.   Psychiatric/Behavioral: Negative for agitation and confusion.     Objective:     Vital Signs (Most Recent):  Temp: 97.4 °F (36.3 °C) (02/11/22 1131)  Pulse: 83 (02/11/22 1300)  Resp: 16 (02/11/22 1300)  BP: (!) 118/59 (02/11/22 1300)  SpO2: 99 % (02/11/22 1131) Vital Signs (24h Range):  Temp:  [97.4 °F (36.3 °C)-98.3 °F (36.8 °C)] 97.4 °F (36.3 °C)  Pulse:  [60-83] 83  Resp:  [16-20] 16  SpO2:  [88 %-100 %] 99 %  BP: ()/(40-61) 118/59     Weight: 60.3 kg (132 lb 15 oz)  Body mass index is 24.31 kg/m².    Intake/Output Summary (Last 24 hours) at 2/11/2022 1352  Last data filed at 2/11/2022 0600  Gross per 24 hour   Intake 2574.56 ml   Output 700 ml   Net 1874.56 ml      Physical Exam  Constitutional:       Appearance: Normal appearance. She is normal weight. She is not ill-appearing or toxic-appearing.   HENT:      Head: Normocephalic and atraumatic.      Right Ear: External ear normal.      Left Ear: External ear normal.      Nose: Nose normal.      Mouth/Throat:      Mouth: Mucous membranes are dry.      Pharynx: Oropharynx is clear. No oropharyngeal exudate.   Eyes:      General: No scleral icterus.     Extraocular Movements: Extraocular movements intact.       Conjunctiva/sclera: Conjunctivae normal.   Cardiovascular:      Rate and Rhythm: Regular rhythm. Tachycardia present.      Pulses: Normal pulses.      Heart sounds: Normal heart sounds. No murmur heard.      Pulmonary:      Effort: Pulmonary effort is normal. No respiratory distress.      Breath sounds: Normal breath sounds.   Abdominal:      General: Abdomen is flat. Bowel sounds are normal. There is no distension.      Tenderness: There is abdominal tenderness (Improved ). There is no guarding.   Musculoskeletal:         General: No swelling. Normal range of motion.      Cervical back: Normal range of motion and neck supple. No rigidity or tenderness.      Right lower leg: No edema.      Left lower leg: No edema.   Skin:     General: Skin is warm and dry.      Capillary Refill: Capillary refill takes 2 to 3 seconds.      Findings: Rash present.   Neurological:      General: No focal deficit present.      Mental Status: She is alert and oriented to person, place, and time. Mental status is at baseline.   Psychiatric:         Mood and Affect: Mood normal.         Behavior: Behavior normal.         Thought Content: Thought content normal.         Judgment: Judgment normal.         Significant Labs: All pertinent labs within the past 24 hours have been reviewed.    Significant Imaging: I have reviewed all pertinent imaging results/findings within the past 24 hours.      Assessment/Plan:      * SBO (small bowel obstruction)  Anayeli Judd is a 77F with hypothyroid, anxiety, T2DM, fatty liver, hx of colon cancer, and recurrent SBO who presented for abdominal pain which is similar to her recurrent partial SBO. She is scheduled for elective ex-lap with lysis of adhesions on 3/4 with gen surg pending medical clearance.     - Follow through negative to SBO  - Having BM  - Advance diet as tolerated  - Abdominal distension improving   - IVFs - LR 100cc/hr           Elevated liver enzymes  Down trending since  admission  Might be medication induces  US liver negative to acute process  Follow labs daily  PT/INR      UTI (urinary tract infection)  UA WBC >100  Zosyn, await Cx result       Anxiety  Continue duloxetine 30mg BID      Hyponatremia  F/u urine lytes      Acute deep vein thrombosis (DVT) of upper extremity  Hx of DVT   Lovenox DVT ppx      Type 2 diabetes mellitus without complication, without long-term current use of insulin  Diet controlled   A1c (two weeks ago) 6.9%  Glucose 221 on admission    LDSSI  POCT BG q6h while NPO      Gait instability  PT/OT      Fatty liver  Tbili 2.8, , and  on admission  Tbili baseline 1.5-1.9  Liver on CT A/P with no cirrhosis or hepatomegaly    Continue to monitor   Daily CMP      Irritable bowel syndrome with both constipation and diarrhea  Holding medications while SBO        History of colon cancer  s/p resection 2010 and xlap/sparkle/bowel resection x2      Hypothyroid  Continue levothyroxine 75 mcg daily        VTE Risk Mitigation (From admission, onward)         Ordered     IP VTE HIGH RISK PATIENT  Once         02/10/22 0325     Place sequential compression device  Until discontinued         02/10/22 0325                Discharge Planning   LOI: 2/14/2022     Code Status: Full Code   Is the patient medically ready for discharge?:     Reason for patient still in hospital (select all that apply): Patient trending condition  Discharge Plan A: Home Health                  Brenda Nicole MD  Department of Hospital Medicine   [facility] - Oncology ([facility])

## 2022-02-11 NOTE — ASSESSMENT & PLAN NOTE
Down trending since admission  Might be medication induces  US liver negative to acute process  Follow labs daily  PT/INR

## 2022-02-11 NOTE — ASSESSMENT & PLAN NOTE
Anayeli Judd is a 77F with hypothyroid, anxiety, T2DM, fatty liver, hx of colon cancer, and recurrent SBO who presented for abdominal pain which is similar to her recurrent partial SBO. She is scheduled for elective ex-lap with lysis of adhesions on 3/4 with gen surg pending medical clearance.     - Follow through negative to SBO  - Having BM  - Advance diet as tolerated  - Abdominal distension improving   - IVFs - LR 100cc/hr

## 2022-02-11 NOTE — PLAN OF CARE
Pt. with nonskid footwear on with bed in lowest position and locked with bed rails up x2, with bed alarm being utilized.  Pt. instructed to call prior to getting OOB.  Pt. with call light within reach and verbalized understanding.  Pt. with antibiotics changed zosyn today.  Pt. With liver US complete this morning.  Pt. With planned bowel follow through tomorrow morning.  Pt. tolerating clear liquid diet.  Pt. with multiple loose BM's today.  Will continue to monitor pt.

## 2022-02-11 NOTE — PT/OT/SLP PROGRESS
"Occupational Therapy      Patient Name:  Anayeli Judd   MRN:  [id number]    Patient not seen today secondary to patient unwilling to participate. Patient communicated she was "not feeling well" and "had a long day already."  Patient educated in importance of progressive therapy and discharge recommendations. Patient communicated "Put me on the weekend". Will follow-up on the weekend as able.     2/11/2022  "

## 2022-02-11 NOTE — ASSESSMENT & PLAN NOTE
Diet controlled   A1c (two weeks ago) 6.9%  Glucose 221 on admission    LDSSI  POCT BG q6h while NPO

## 2022-02-12 VITALS
TEMPERATURE: 98 F | HEIGHT: 62 IN | WEIGHT: 132.94 LBS | HEART RATE: 65 BPM | SYSTOLIC BLOOD PRESSURE: 112 MMHG | DIASTOLIC BLOOD PRESSURE: 57 MMHG | OXYGEN SATURATION: 96 % | BODY MASS INDEX: 24.46 KG/M2 | RESPIRATION RATE: 19 BRPM

## 2022-02-12 LAB
ALBUMIN SERPL BCP-MCNC: 2.8 G/DL (ref 3.5–5.2)
ALP SERPL-CCNC: 84 U/L (ref 55–135)
ALT SERPL W/O P-5'-P-CCNC: 41 U/L (ref 10–44)
ANION GAP SERPL CALC-SCNC: 9 MMOL/L (ref 8–16)
AST SERPL-CCNC: 32 U/L (ref 10–40)
BASOPHILS # BLD AUTO: 0.02 K/UL (ref 0–0.2)
BASOPHILS NFR BLD: 0.6 % (ref 0–1.9)
BILIRUB SERPL-MCNC: 0.9 MG/DL (ref 0.1–1)
BUN SERPL-MCNC: 12 MG/DL (ref 8–23)
CALCIUM SERPL-MCNC: 9.2 MG/DL (ref 8.7–10.5)
CHLORIDE SERPL-SCNC: 103 MMOL/L (ref 95–110)
CO2 SERPL-SCNC: 26 MMOL/L (ref 23–29)
CREAT SERPL-MCNC: 0.7 MG/DL (ref 0.5–1.4)
DIFFERENTIAL METHOD: ABNORMAL
EOSINOPHIL # BLD AUTO: 0.2 K/UL (ref 0–0.5)
EOSINOPHIL NFR BLD: 5.7 % (ref 0–8)
ERYTHROCYTE [DISTWIDTH] IN BLOOD BY AUTOMATED COUNT: 12.9 % (ref 11.5–14.5)
EST. GFR  (AFRICAN AMERICAN): >60 ML/MIN/1.73 M^2
EST. GFR  (NON AFRICAN AMERICAN): >60 ML/MIN/1.73 M^2
GLUCOSE SERPL-MCNC: 140 MG/DL (ref 70–110)
HCT VFR BLD AUTO: 29.5 % (ref 37–48.5)
HGB BLD-MCNC: 9.5 G/DL (ref 12–16)
IMM GRANULOCYTES # BLD AUTO: 0.01 K/UL (ref 0–0.04)
IMM GRANULOCYTES NFR BLD AUTO: 0.3 % (ref 0–0.5)
LYMPHOCYTES # BLD AUTO: 1.1 K/UL (ref 1–4.8)
LYMPHOCYTES NFR BLD: 35.1 % (ref 18–48)
MCH RBC QN AUTO: 31.9 PG (ref 27–31)
MCHC RBC AUTO-ENTMCNC: 32.2 G/DL (ref 32–36)
MCV RBC AUTO: 99 FL (ref 82–98)
MONOCYTES # BLD AUTO: 0.4 K/UL (ref 0.3–1)
MONOCYTES NFR BLD: 11.4 % (ref 4–15)
NEUTROPHILS # BLD AUTO: 1.5 K/UL (ref 1.8–7.7)
NEUTROPHILS NFR BLD: 46.9 % (ref 38–73)
NRBC BLD-RTO: 0 /100 WBC
PLATELET # BLD AUTO: 69 K/UL (ref 150–450)
PMV BLD AUTO: 11.1 FL (ref 9.2–12.9)
POTASSIUM SERPL-SCNC: 3.5 MMOL/L (ref 3.5–5.1)
PROT SERPL-MCNC: 5.7 G/DL (ref 6–8.4)
RBC # BLD AUTO: 2.98 M/UL (ref 4–5.4)
SODIUM SERPL-SCNC: 138 MMOL/L (ref 136–145)
WBC # BLD AUTO: 3.16 K/UL (ref 3.9–12.7)

## 2022-02-12 PROCEDURE — 36415 COLL VENOUS BLD VENIPUNCTURE: CPT

## 2022-02-12 PROCEDURE — 97116 GAIT TRAINING THERAPY: CPT | Mod: CQ

## 2022-02-12 PROCEDURE — 85025 COMPLETE CBC W/AUTO DIFF WBC: CPT

## 2022-02-12 PROCEDURE — 99239 HOSP IP/OBS DSCHRG MGMT >30: CPT | Mod: ,,, | Performed by: HOSPITALIST

## 2022-02-12 PROCEDURE — 25000003 PHARM REV CODE 250

## 2022-02-12 PROCEDURE — 25000003 PHARM REV CODE 250: Performed by: STUDENT IN AN ORGANIZED HEALTH CARE EDUCATION/TRAINING PROGRAM

## 2022-02-12 PROCEDURE — 80053 COMPREHEN METABOLIC PANEL: CPT

## 2022-02-12 PROCEDURE — 97530 THERAPEUTIC ACTIVITIES: CPT | Mod: CQ

## 2022-02-12 PROCEDURE — 99239 PR HOSPITAL DISCHARGE DAY,>30 MIN: ICD-10-PCS | Mod: ,,, | Performed by: HOSPITALIST

## 2022-02-12 RX ORDER — CIPROFLOXACIN 500 MG/1
500 TABLET ORAL EVERY 12 HOURS
Qty: 8 TABLET | Refills: 0 | Status: SHIPPED | OUTPATIENT
Start: 2022-02-12 | End: 2022-02-16

## 2022-02-12 RX ORDER — GABAPENTIN 100 MG/1
100 CAPSULE ORAL ONCE
Status: COMPLETED | OUTPATIENT
Start: 2022-02-12 | End: 2022-02-12

## 2022-02-12 RX ORDER — AMOXICILLIN 250 MG
1 CAPSULE ORAL 2 TIMES DAILY PRN
Start: 2022-02-12 | End: 2022-05-13

## 2022-02-12 RX ORDER — AZELASTINE 1 MG/ML
2 SPRAY, METERED NASAL 2 TIMES DAILY PRN
Start: 2022-02-12 | End: 2022-10-27

## 2022-02-12 RX ORDER — GABAPENTIN 600 MG/1
600 TABLET ORAL 2 TIMES DAILY
Qty: 180 TABLET | Refills: 3 | Status: ON HOLD | OUTPATIENT
Start: 2022-02-12 | End: 2022-06-11 | Stop reason: HOSPADM

## 2022-02-12 RX ADMIN — DULOXETINE 30 MG: 30 CAPSULE, DELAYED RELEASE ORAL at 09:02

## 2022-02-12 RX ADMIN — ATORVASTATIN CALCIUM 40 MG: 20 TABLET, FILM COATED ORAL at 09:02

## 2022-02-12 RX ADMIN — CIPROFLOXACIN 500 MG: 500 TABLET, FILM COATED ORAL at 09:02

## 2022-02-12 RX ADMIN — LIDOCAINE 1 PATCH: 50 PATCH CUTANEOUS at 09:02

## 2022-02-12 RX ADMIN — Medication 500 MG: at 09:02

## 2022-02-12 RX ADMIN — MONTELUKAST 10 MG: 10 TABLET, FILM COATED ORAL at 09:02

## 2022-02-12 RX ADMIN — Medication 6 MG: at 02:02

## 2022-02-12 RX ADMIN — GABAPENTIN 100 MG: 100 CAPSULE ORAL at 02:02

## 2022-02-12 RX ADMIN — ACETAMINOPHEN 650 MG: 325 TABLET ORAL at 02:02

## 2022-02-12 RX ADMIN — LEVOTHYROXINE SODIUM 75 MCG: 75 TABLET ORAL at 04:02

## 2022-02-12 NOTE — DISCHARGE SUMMARY
[facility]ansen - Oncology ([facility])  Hospital Medicine  Discharge Summary      Patient Name: Anayeli Judd  MRN: [id number]  Patient Class: IP- Inpatient  Admission Date: 2/9/2022  Hospital Length of Stay: 2 days  Discharge Date and Time:  02/12/2022 11:39 AM  Attending Physician: Katie Nicole MD   Discharging Provider: Kimberly Mcmanus MD  Primary Care Provider: Darci Guthrie MD  [facility] Medicine Team: [facility] HOSP MED 1     HPI:   Anayeli Judd is a 77F with hypothyroid, anxiety, T2DM, fatty liver, hx of colon cancer, and recurrent SBO who presented for abdominal pain of multiple day duration which has been worsening and is associated with constipation and vomiting. She reports small Bms with taking Miralax and Metamucil. Tolerates PO intake though associated with mild nausea. Of note, she was recently started on antibiotics for a UTI by her PCP. She has had similar abdominal pain episodes (5 presentations since Nov) for SBO. She was recently seen in Gen Surg outpt clinic and has elective ex-lap scheduled 3/4 for lysis of adhesions.    In the ED, VSS. Labs notable  for hyponatremia, elevated bilirubin, elevated LFTs, elevated glucose 221, anemia, and thrombocytopenia. UA concerning for UTI. CT A/P w/ contrast positive for partial SBO at similar site as prior episodes and scheduled surgery. Patient was admitted to hospital medicine for further management.      * No surgery found *      Hospital Course:   Admitted for concerns of SBO. Surgery consulted, recommended small bowel follow through, which showed mildly prominent loops of small bowel with normal small bowel transit time.  No evidence of high-grade obstruction.  Findings are similar compared to 01/14/2022. Liver enzymes slightly elevated, Liver US showed heterogenous hepatic parenchymal echogenicity, nonspecific, although may be seen in steatosis. Noted thrombocytopenia, on chart review followed by Heme/Onc for myelodysplastic syndrome. Ordered peripheral  smear (pending at discharge). PT/INR and fibrinogen WNL. Tolerated advancement of diet without nausea/vomiting or recurrent abdominal pain. Urine culture grew Proteus. Discharged home with home health on ciprofloxacin to complete antibiotic course for UTI. To follow up with General Surgery as scheduled for procedure on 3/4/22. Gabapentin refilled at discharge per patient request.       Goals of Care Treatment Preferences:  Code Status: Full Code    Living Will: Yes              Consults:   Consults (From admission, onward)        Status Ordering Provider     IP consult to case management  Once        Provider:  (Not yet assigned)    Acknowledged TARA FAM     Inpatient consult to General surgery  Once        Provider:  (Not yet assigned)    Completed PAOLA BAKER          * SBO (small bowel obstruction)  77F with hypothyroid, anxiety, T2DM, fatty liver, hx of colon cancer, and recurrent SBO who presented for abdominal pain which is similar to her recurrent partial SBO. She is scheduled for elective ex-lap with lysis of adhesions on 3/4 with Gen Surg pending medical clearance. Small bowel follow through negative for SBO. Having bowel movements and tolerating some PO.    - Advance diet as tolerated  - Abdominal distension improving          UTI (urinary tract infection)  Was being treated with macrobid prior to admission. UA WBC >100, UCx with Proteus resistant to macrobid.    - cipro 500 BID  - s/p zosyn x 1 day    Elevated liver enzymes  RESOLVED  Down trending since admission. Might be medication induced from macrobid. US liver negative to acute process. INR not elevated. Normalized 2/12.    - monitor on labs    Hyponatremia  RESOLVED    - monitor on labs      Anxiety  - continue duloxetine 30mg BID      Acute deep vein thrombosis (DVT) of upper extremity  Hx of DVT    - Lovenox for DVT ppx    Type 2 diabetes mellitus without complication, without long-term current use of insulin  Diet controlled. A1c  (two weeks ago) 6.9%. Glucose 221 on admission    - LDSSI  - POCT BGL      Fatty liver  Tbili 2.8, , and  on admission  Tbili baseline 1.5-1.9  Liver on CT A/P with no cirrhosis or hepatomegaly    Continue to monitor   Daily CMP      Irritable bowel syndrome with both constipation and diarrhea  Holding medications while SBO        History of colon cancer  - s/p resection 2010 and xlap/sparkle/bowel resection x2      Hypothyroid  - continue levothyroxine 75 mcg daily      Gait instability  PT/OT consulted, recommend home health        Final Active Diagnoses:    Diagnosis Date Noted POA    PRINCIPAL PROBLEM:  SBO (small bowel obstruction) [K56.609] 03/11/2020 Yes    UTI (urinary tract infection) [N39.0] 02/10/2022 Yes    Elevated liver enzymes [R74.8] 02/11/2022 Yes    Hyponatremia [E87.1] 11/15/2018 Yes    Anxiety [F41.9] 02/10/2022 Yes     Chronic    Acute deep vein thrombosis (DVT) of upper extremity [I82.629] 04/15/2017 Yes     Chronic    Type 2 diabetes mellitus without complication, without long-term current use of insulin [E11.9] 05/05/2015 Yes     Chronic    Fatty liver [K76.0]  Yes     Chronic    History of colon cancer [Z85.038] 04/05/2013 Yes     Chronic    Irritable bowel syndrome with both constipation and diarrhea [K58.2] 04/05/2013 Yes     Chronic    Hypothyroid [E03.9] 01/11/2013 Yes     Chronic    Gait instability [R26.81] 03/24/2015 Yes     Chronic      Problems Resolved During this Admission:       Discharged Condition: stable    [place]position[place] [facility]    Follow Up:   Follow-up Information     [facility] day.    Specialty: Home Health Services  Contact information:  [address] [place] [address].  [address]  [place] [id number] [phone]                       Patient Instructions:   No discharge procedures on file.    Significant Diagnostic Studies: Labs:   CMP   Recent Labs   Lab 02/11/22  0259 02/12/22  0359    138   K 3.8 3.5    103    CO2 26 26   * 140*   BUN 8 12   CREATININE 0.8 0.7   CALCIUM 9.1 9.2   PROT 5.5* 5.7*   ALBUMIN 2.8* 2.8*   BILITOT 1.3* 0.9   ALKPHOS 80 84   AST 47* 32   ALT 51* 41   ANIONGAP 5* 9   ESTGFRAFRICA >60.0 >60.0   EGFRNONAA >60.0 >60.0    and INR   Lab Results   Component Value Date    INR 1.1 02/11/2022    INR 1.1 02/10/2022    INR 1.0 05/14/2021     2/10/22 CT abdomen/pelvis with contrast  Multiple distended loops of small bowel with air-fluid levels and possible transition point in the right upper quadrant, suggestive of partial small-bowel obstruction.  This appears to be at a site of bowel loops containing radiopaque sutures and may represent anastomotic stricture.     Postsurgical changes in the bowel, similar to prior.     Increased soft tissue about the cenliac axis, SMA and aortic bifurcation with scattered para-aortic nodes, similar to prior.  Etiology and significant remains unknown.  Correlate clinically for potential arteritis.    2/10/22 US Liver with doppler  Satisfactory Doppler evaluation of the liver.     Heterogenous hepatic parenchymal echogenicity, nonspecific, although may be seen in steatosis.     Status post cholecystectomy.    2/11/22 Small Bowel Follow Through  Mildly prominent loops of small bowel with normal small bowel transit time.  No evidence of high-grade obstruction.  Findings are similar compared to 01/14/2022.    Pending Diagnostic Studies:     None         Medications:  Reconciled Home Medications:      Medication List      START taking these medications    ciprofloxacin HCl 500 MG tablet  Commonly known as: CIPRO  Take 1 tablet (500 mg total) by mouth every 12 (twelve) hours. for 4 days     gabapentin 600 MG tablet  Commonly known as: NEURONTIN  Take 1 tablet (600 mg total) by mouth 2 (two) times daily.        CONTINUE taking these medications    acetaminophen 650 MG Tbsr  Commonly known as: TYLENOL  Take 650 mg by mouth once as needed (pain).     ascorbic acid (vitamin  C) 1000 MG tablet  Commonly known as: VITAMIN C  Take 1,000 mg by mouth once daily.     atorvastatin 40 MG tablet  Commonly known as: LIPITOR  TAKE 1 TABLET BY MOUTH  DAILY     azelastine 137 mcg (0.1 %) nasal spray  Commonly known as: ASTELIN  2 sprays (274 mcg total) by Nasal route 2 (two) times daily as needed for Rhinitis.     biotin 10,000 mcg Tbdl  Take 1 tablet by mouth once daily.     butalbital-acetaminophen-caffeine -40 mg -40 mg per tablet  Commonly known as: FIORICET, ESGIC  1 tablet every 8 (eight) hours as needed for Pain.     cholestyramine-aspartame 4 gram Pwpk  Commonly known as: CHOLESTYRAMINE LIGHT  Take 1 packet (4 g total) by mouth daily as needed (Diarrhea).     co-enzyme Q-10 30 mg capsule  Take 30 mg by mouth 3 (three) times daily.     cranberry extract 200 mg Cap  Take 1 capsule by mouth once daily.     D-MANNOSE ORAL  Take 1 tablet by mouth once daily.     DULoxetine 30 MG capsule  Commonly known as: CYMBALTA  Take 1 capsule (30 mg total) by mouth 2 (two) times daily.     FLONASE ALLERGY RELIEF 50 mcg/actuation nasal spray  Generic drug: fluticasone propionate  1 spray by Each Nostril route daily as needed for Rhinitis.     FREESTYLE EFREN 10 DAY READER Misc  Generic drug: flash glucose scanning reader  TEST as directed     FREESTYLE EFREN 14 DAY SENSOR Kit  Generic drug: flash glucose sensor  1 application by Misc.(Non-Drug; Combo Route) route every 14 (fourteen) days. Disp 30 or 90 day refill     FREESTYLE LITE STRIPS Strp  Generic drug: blood sugar diagnostic  TEST DAILY AS DIRECTED     hydrocortisone 2.5 % cream  Apply topically 2 (two) times daily as needed.     hyoscyamine 0.125 mg Tab  Commonly known as: ANASPAZ,LEVSIN  Take 1 tablet (125 mcg total) by mouth every 8 (eight) hours as needed (urgency).     levothyroxine 75 MCG tablet  Commonly known as: SYNTHROID  Take 1 tablet (75 mcg total) by mouth before breakfast.     LIDOcaine 5 %  Commonly known as: LIDODERM  Place 1  patch onto the skin once daily. Remove & Discard patch within 12 hours or as directed by MD     magnesium 250 mg Tab  Take 2 capsules by mouth once daily.     montelukast 10 mg tablet  Commonly known as: SINGULAIR  TAKE 1 TABLET BY MOUTH  DAILY     MYRBETRIQ 50 mg Tb24  Generic drug: mirabegron  Take 1 tablet by mouth once daily.     ondansetron 8 MG Tbdl  Commonly known as: ZOFRAN-ODT  Take 1 tablet (8 mg total) by mouth every 8 (eight) hours as needed (nausea).     pantoprazole 20 MG tablet  Commonly known as: PROTONIX  Take 1 tablet (20 mg total) by mouth once daily.     polyethylene glycol 17 gram/dose powder  Commonly known as: GLYCOLAX  Take 17 g by mouth before dinner.     PREMARIN vaginal cream  Generic drug: conjugated estrogens  INSERT 1/2 GRAM VAGINALLY  TWICE WEEKLY     psyllium husk (aspartame) 3.4 gram Pwpk packet  Commonly known as: METAMUCIL  Take 1 packet by mouth 2 (two) times daily as needed (constipation).     senna-docusate 8.6-50 mg 8.6-50 mg per tablet  Commonly known as: PERICOLACE  Take 1 tablet by mouth 2 (two) times daily as needed for Constipation.     simethicone 80 MG chewable tablet  Commonly known as: MYLICON  Take 1 tablet (80 mg total) by mouth every 6 (six) hours as needed for Flatulence.     vitamin D 1000 units Tab  Commonly known as: VITAMIN D3  Take 1,000 Units by mouth once daily. D3        STOP taking these medications    dicyclomine 20 mg tablet  Commonly known as: BENTYL     HYDROcodone-acetaminophen 5-325 mg per tablet  Commonly known as: NORCO     lactulose 10 gram/15 mL solution  Commonly known as: CHRONULAC     lactulose 20 gram Pack  Commonly known as: CEPHULAC     nitrofurantoin (macrocrystal-monohydrate) 100 MG capsule  Commonly known as: MACROBID     omega 3-dha-epa-fish oil 1,200 (144-216) mg Cap     promethazine-codeine 6.25-10 mg/5 ml 6.25-10 mg/5 mL syrup  Commonly known as: PHENERGAN with CODEINE     valACYclovir 500 MG tablet  Commonly known as: VALTREX             Indwelling Lines/Drains at time of discharge:   Lines/Drains/Airways     None                 Time spent on the discharge of patient: 35 minutes         Kimberly Mcmanus MD  Department of Hospital Medicine  [facility] - Oncology ([facility])   15

## 2022-02-12 NOTE — PT/OT/SLP PROGRESS
Physical Therapy Treatment    Patient Name:  Anayeli Judd   MRN:  [id number]    Recommendations:     Discharge Recommendations:  home health PT   Discharge Equipment Recommendations: none   Barriers to discharge: Decreased caregiver support    Assessment:     Anayeli Judd is a 77 y.o. female admitted with a medical diagnosis of SBO (small bowel obstruction).  She presents with the following impairments/functional limitations:   (weakness; impaired endurance; impaired self care skills; impaired functional mobilty; gait instability; impaired balance; decreased upper extremity function; decreased lower extremity function; impaired cardiopulmonary response to activity) . Pt was  motivated and cooperative with treatment session. Pt Progressing with PT Intervention. Pt Progressing with improving gait distance. Pt would continue to benefit from skilled PT to address overall functional mobility, goals , and to return to functional baseline.  Goals remain appropriate.      Rehab Prognosis: Good; patient would benefit from acute skilled PT services to address these deficits and reach maximum level of function.    Recent Surgery: * No surgery found *      Plan:     During this hospitalization, patient to be seen 4 x/week to address the identified rehab impairments via gait training,therapeutic activities,therapeutic exercises,neuromuscular re-education and progress toward the following goals:    · Plan of Care Expires:  03/10/22    Subjective     Chief Complaint: I keep going to the bathroom when I get up, I need a diaper    Pain/Comfort:  · Pain Rating 1: 0/10  · Pain Rating Post-Intervention 1: 0/10      Objective:     Communicated with RN prior to session.  Patient found HOB elevated with telemetry upon PT entry to room.     General Precautions: Standard, fall   Orthopedic Precautions:N/A   Braces: N/A  Respiratory Status: Room air     Functional Mobility:  Bed Mobility:     Rolling Right: supervision  Scooting:  supervision  Supine to Sit: supervision  Transfers:     Sit to Stand:  stand by assistance with no AD  Gait:  Pt amb 35o ft with   stand by assistance and contact guard assistanceno AD. Pt demo  decreased speed, step length, swing through, heel strike, forward flexed posture, and downward gaze. vcs for improved upright posture, gaze direction and gait fluidity.     AM-PAC 6 CLICK MOBILITY  Turning over in bed (including adjusting bedclothes, sheets and blankets)?: 3  Sitting down on and standing up from a chair with arms (e.g., wheelchair, bedside commode, etc.): 3  Moving from lying on back to sitting on the side of the bed?: 3  Moving to and from a bed to a chair (including a wheelchair)?: 3  Need to walk in hospital room?: 3  Climbing 3-5 steps with a railing?: 3  Basic Mobility Total Score: 18       Therapeutic Activities and Exercises:   Therapist provided instruction and educated of  patient on progress, safety,d/c,PT POC,   proper body mechanics, energy conservation, and fall prevention strategies during tasks listed above, on the effects of prolonged immobility and the importance of performing OOB activity and exercises to promote healing and reduce recovery time      Updated white board with appropriate PT mobility information for medical team notification     Donned an extra gown and brief  Call nursing/pct to transfer to chair/use bathroom. Pt stated understanding      Bedside table in front of patient and area set up for function, convenience, and safety. RN aware of patient's mobility needs and status. Questions/concerns addressed within PTA scope of practice; patient  with no further questions. Time was provided for active listening, discussion of health disposition, and discussion of safe discharge. Pt?verbalized?agreement .    Patient left up in chair with all lines intact, call button in reach and nsg notified..    GOALS:   Multidisciplinary Problems     Physical Therapy Goals        Problem:  Physical Therapy Goal    Goal Priority Disciplines Outcome Goal Variances Interventions   Physical Therapy Goal     PT, PT/OT Ongoing, Progressing     Description: Goals to be met by: 22     Patient will increase functional independence with mobility by performin. Supine to sit with [place]  2. Sit to supine with [place]  3. Sit to stand transfer with [place]  4. Gait  x 150 feet with Supervision using LRAD  5. Lower extremity exercise program x 20 reps per handout, with independence                       Time Tracking:     PT Received On: 22  PT Start Time: 0950     PT Stop Time: 1013  PT Total Time (min): 23 min     Billable Minutes: Gait Training 15 and Therapeutic Activity 8    Treatment Type: Treatment  PT/PTA: PTA     PTA Visit Number: 1     2022

## 2022-02-12 NOTE — PROGRESS NOTES
Pt discharged to home with HH per MD order. Discharge instructions and prescriptions given and explained to pt and daughter. PIV removed prior to d/c.  Pt ambulating in room with steady gait; tolerating diet; voiding without difficulty. All VSS; O2 sats WNL. Pt left floor by wheelchair to home via Hospital transportation; accompanied by daughter from the floor.

## 2022-02-12 NOTE — ASSESSMENT & PLAN NOTE
RESOLVED  Down trending since admission. Might be medication induced from macrobid. US liver negative to acute process. INR not elevated. Normalized 2/12.    - monitor on labs

## 2022-02-12 NOTE — PLAN OF CARE
Side rails up x2; call bell in place; bed in lowest, locked position; skid proof socks on; no evidence of skin breakdown; care plan explained to patient; pt remains free of injury.  Pt returned from procedure without incident. Pt tolerated po, voids, Diarrhea BM x 4, ambulates short distance with assistance. 2.5 L NC in progress. Pt with c/o pain morphine 2 mg iv given for PS 9/10, pt stated she had relief. Frequent rounding in progress and encouraged to call as needed. VSS and afebrile.

## 2022-02-12 NOTE — ASSESSMENT & PLAN NOTE
77F with hypothyroid, anxiety, T2DM, fatty liver, hx of colon cancer, and recurrent SBO who presented for abdominal pain which is similar to her recurrent partial SBO. She is scheduled for elective ex-lap with lysis of adhesions on 3/4 with Gen Surg pending medical clearance. Small bowel follow through negative for SBO. Having bowel movements and tolerating some PO.    - Advance diet as tolerated  - Abdominal distension improving

## 2022-02-12 NOTE — DISCHARGE INSTRUCTIONS
Take ciprofloxacin to finish treatment for your UTI, last day 2/16.    Follow up with General Surgery regarding the planned procedure on 3/4/22.

## 2022-02-12 NOTE — PLAN OF CARE
Patient progressing towards discharge.    Patient had no acute events noted.   Patient had complaints of neuropathy pain to her toes early this morning.  One time dose of Neurontin given as ordered.  Insomnia relieved with melatonin.  No complaints of back pain or stomach pain overnight.  Discussed POC with patient and family with verbalization of understanding.    Will continue to monitor.

## 2022-02-12 NOTE — ASSESSMENT & PLAN NOTE
Was being treated with macrobid prior to admission. UA WBC >100, UCx with Proteus resistant to macrobid.    - cipro 500 BID  - s/p zosyn x 1 day

## 2022-02-14 ENCOUNTER — DOCUMENT SCAN (OUTPATIENT)
Dept: HOME HEALTH SERVICES | Facility: HOSPITAL | Age: 77
End: 2022-02-14
Payer: MEDICARE

## 2022-02-14 ENCOUNTER — PATIENT OUTREACH (OUTPATIENT)
Dept: ADMINISTRATIVE | Facility: CLINIC | Age: 77
End: 2022-02-14
Payer: MEDICARE

## 2022-02-14 DIAGNOSIS — K56.609 SBO (SMALL BOWEL OBSTRUCTION): Primary | ICD-10-CM

## 2022-02-14 NOTE — PATIENT INSTRUCTIONS
Patient Education       Small Bowel Obstruction Discharge Instructions   About this topic   A small bowel obstruction is when the small bowel is blocked. Doctors may order tests to diagnose a blocked small bowel. The small bowel may be partly blocked or totally blocked. This obstruction may cause a buildup of stool and other digested contents inside the bowel. It may cause serious problems if not treated right away.       What care is needed at home?   · Ask your doctor what you need to do when you go home. Make sure you ask questions if you do not understand what the doctor says. This way you will know what you need to do.  · You may have a tube in your nose in the hospital to remove digestive fluids. If you still have a tube in your nose when you go home, your doctor will tell you how to clean and care for it.  · Take your drugs as ordered by your doctor.  If you had surgery, ask your doctor about how to care for your cut site:  · When you should change your bandages  · When you may take a bath or shower  · If you need to be careful with lifting things over 10 pounds (4.5 kg)  · When you may go back to your normal activities like work and driving  What follow-up care is needed?   · Your doctor may ask you to make visits to the office to check on your progress. Be sure to keep these visits.  · If you have stitches or staples, you will need to have them taken out. Your doctor will often want to do this in 1 to 2 weeks. If the doctor used skin glue, the glue will fall off on its own.  · Your doctor may order extra tests to see how well you are doing.  · If this condition was caused by cancer, your doctor may tell you to have other treatments for the cancer.  What drugs may be needed?   The doctor may order drugs to:  · Help with pain  · Fight an infection  · Keep your stool soft  Will physical activity be limited?   Physical activities may be limited if you are suffering from pain. Talk to your doctor about the right  amount of activity for you. If your doctor says it is okay, take short walks every day to help prevent blood clots.  What changes to diet are needed?   Ask your doctor or dietitian for a diet plan. Your doctor may suggest a liquid or soft diet until your bowel is stable and ready for regular food. Drink lots of clear liquids. Clear liquids include water, fruit juices, soup broth, gelatin, popsicles that do not have fruit or fruit pulp, tea or coffee with no added milk, and sports drinks. Avoid beer, wine, and mixed drinks (alcohol) and limit caffeine.  What problems could happen?   · Hard stools  · Dehydration  · Infection  · Tear or hole in the small bowel  · Tissue death  · Bowel function does not return to normal  When do I need to call the doctor?   · Signs of infection. These include a fever of 100.4°F (38°C) or higher, chills, pain with passing urine or not able to pass urine, wound that will not heal.  · Signs of wound infection. These include swelling, redness, warmth around the wound; too much pain when touched; yellowish, greenish, or bloody discharge; foul smell coming from the cut site; cut site opens up.  · Unable to pass stool or gas  · Very upset stomach or throwing up  · Very bad belly pain  Teach Back: Helping You Understand   The Teach Back Method helps you understand the information we are giving you. After you talk with the staff, tell them in your own words what you learned. This helps to make sure the staff has described each thing clearly. It also helps to explain things that may have been confusing. Before going home, make sure you can do these:  · I can tell you about my condition.  · I can tell you how to care for my cut site, if I have one.  · I can tell you what I will do if I have an upset stomach, throwing up, or am not able to pass stool or gas.  Where can I learn more?   [employer]  [place]  http://www.cancer.gov/cancertopics/pdq/supportivecare/gastrointestinalcomplications/Patient/page4   Last Reviewed Date   2021-05-19  Consumer Information Use and Disclaimer   This information is not specific medical advice and does not replace information you receive from your health care provider. This is only a brief summary of general information. It does NOT include all information about conditions, illnesses, injuries, tests, procedures, treatments, therapies, discharge instructions or life-style choices that may apply to you. You must talk with your health care provider for complete information about your health and treatment options. This information should not be used to decide whether or not to accept your health care providers advice, instructions or recommendations. Only your health care provider has the knowledge and training to provide advice that is right for you.  Copyright   Copyright © 2021 UpToDate, Inc. and its affiliates and/or licensors. All rights reserved.  [facility] teaching reviewed with Anayeli Judd's caregiver    . Anayeli Judd's caregiver     verbalized understanding.    Education was provided based on the patient's discharge diagnosis using the attached [facility] patient education as a reference.

## 2022-02-14 NOTE — PROGRESS NOTES
C3 nurse spoke with Anayeli Judd's caregiver Keya for a TCC post hospital discharge follow up call. NP referral placed for [facility].

## 2022-02-15 LAB
BACTERIA BLD CULT: NORMAL
BACTERIA BLD CULT: NORMAL
PATH REV BLD -IMP: NORMAL

## 2022-02-17 ENCOUNTER — PATIENT MESSAGE (OUTPATIENT)
Dept: SURGERY | Facility: HOSPITAL | Age: 77
End: 2022-02-17
Payer: MEDICARE

## 2022-02-17 ENCOUNTER — OFFICE VISIT (OUTPATIENT)
Dept: INTERNAL MEDICINE | Facility: CLINIC | Age: 77
End: 2022-02-17
Payer: MEDICARE

## 2022-02-17 VITALS
HEIGHT: 62 IN | WEIGHT: 131.94 LBS | DIASTOLIC BLOOD PRESSURE: 69 MMHG | HEART RATE: 66 BPM | SYSTOLIC BLOOD PRESSURE: 112 MMHG | TEMPERATURE: 98 F | BODY MASS INDEX: 24.28 KG/M2

## 2022-02-17 DIAGNOSIS — Z09 HOSPITAL DISCHARGE FOLLOW-UP: Primary | ICD-10-CM

## 2022-02-17 DIAGNOSIS — N30.00 ACUTE CYSTITIS WITHOUT HEMATURIA: ICD-10-CM

## 2022-02-17 DIAGNOSIS — K56.609 SBO (SMALL BOWEL OBSTRUCTION): ICD-10-CM

## 2022-02-17 DIAGNOSIS — E11.9 TYPE 2 DIABETES MELLITUS WITHOUT COMPLICATION, WITHOUT LONG-TERM CURRENT USE OF INSULIN: ICD-10-CM

## 2022-02-17 PROCEDURE — 99999 PR PBB SHADOW E&M-EST. PATIENT-LVL III: CPT | Mod: PBBFAC,,, | Performed by: INTERNAL MEDICINE

## 2022-02-17 PROCEDURE — 99213 OFFICE O/P EST LOW 20 MIN: CPT | Mod: PBBFAC | Performed by: INTERNAL MEDICINE

## 2022-02-17 PROCEDURE — 99214 OFFICE O/P EST MOD 30 MIN: CPT | Mod: S$PBB,,, | Performed by: INTERNAL MEDICINE

## 2022-02-17 PROCEDURE — 99999 PR PBB SHADOW E&M-EST. PATIENT-LVL III: ICD-10-PCS | Mod: PBBFAC,,, | Performed by: INTERNAL MEDICINE

## 2022-02-17 PROCEDURE — 99214 PR OFFICE/OUTPT VISIT, EST, LEVL IV, 30-39 MIN: ICD-10-PCS | Mod: S$PBB,,, | Performed by: INTERNAL MEDICINE

## 2022-02-17 NOTE — PROGRESS NOTES
Subjective:       Patient ID: Anayeli Judd is a 77 y.o. female.    Chief Complaint: Hospital f/u    HPI:  Recent admission for partial SBO, which has been a frequently recurrent issue. Resolved with monitoring, SBFT, and diet advancement. GI following. Has seen Surgery and planning for laparotomy for probable TIFFANY early next month. Since hospital, having frequent bm's. No fevers, abdominal pain much improved and generally feels like the frequent bm's are helping currently. Using Miralax in afternoon daily, Metamucil and probiotic daily as well. Good appetite, eating without issue, drinking plenty of water. Hx of T2DM, stopped Metformin 500 TID a couple months ago. Reviewed home sugar checks. Typically 100's in early am, 200 to 300's in pm. Admits the higher levels in 300's typically after foods like calvin cake.  Finished course of Cipro for GNR UTI noted prior to recent admission.    Review of Systems   Constitutional: Negative for appetite change, chills and fever.   Respiratory: Negative for cough and shortness of breath.    Cardiovascular: Negative for chest pain and leg swelling.   Gastrointestinal: Positive for diarrhea. Negative for abdominal pain, blood in stool, constipation, nausea and vomiting.   Genitourinary: Positive for urgency (Baseline). Negative for difficulty urinating, dysuria and frequency.   Neurological: Negative for syncope and light-headedness.       Past Medical History:   Diagnosis Date    Abdominal pain     Allergic rhinitis     Arthritis     Blood platelet disorder     Blood platelet disorder     Blood transfusion     during delivery and     Bowel obstruction     Cervical radiculopathy     followed by dr cloud    Colon cancer     transverse colon; resected; Stage IIA (pT3 pN0 MX)    Diabetes mellitus     Diarrhea     Falls 2020    Family history of breast cancer     Family history of colon cancer     Fatty liver     GERD (gastroesophageal reflux  disease)     History of shingles     Hyperlipidemia     Hypothyroidism     Irritable bowel syndrome     Microscopic colitis     treated 2013    Myelodysplastic syndrome     Raynaud phenomenon     Raynaud's disease     Type 2 diabetes mellitus          Current Outpatient Medications:     ascorbic acid, vitamin C, (VITAMIN C) 1000 MG tablet, Take 1,000 mg by mouth once daily., Disp: , Rfl:     atorvastatin (LIPITOR) 40 MG tablet, TAKE 1 TABLET BY MOUTH  DAILY, Disp: 90 tablet, Rfl: 3    azelastine (ASTELIN) 137 mcg (0.1 %) nasal spray, 2 sprays (274 mcg total) by Nasal route 2 (two) times daily as needed for Rhinitis., Disp: , Rfl:     biotin 10,000 mcg TbDL, Take 1 tablet by mouth once daily. , Disp: , Rfl:     co-enzyme Q-10 30 mg capsule, Take 30 mg by mouth 3 (three) times daily., Disp: , Rfl:     conjugated estrogens (PREMARIN) vaginal cream, INSERT 1/2 GRAM VAGINALLY  TWICE WEEKLY, Disp: 30 g, Rfl: 3    cranberry extract 200 mg Cap, Take 1 capsule by mouth once daily., Disp: , Rfl:     D-MANNOSE ORAL, Take 1 tablet by mouth once daily. , Disp: , Rfl:     DULoxetine (CYMBALTA) 30 MG capsule, Take 1 capsule (30 mg total) by mouth 2 (two) times daily., Disp: 180 capsule, Rfl: 3    flash glucose sensor (FREESTYLE EFREN 14 DAY SENSOR) Kit, 1 application by Misc.(Non-Drug; Combo Route) route every 14 (fourteen) days. Disp 30 or 90 day refill, Disp: 6 kit, Rfl: 6    fluticasone propionate (FLONASE) 50 mcg/actuation nasal spray, 1 spray by Each Nostril route daily as needed for Rhinitis., Disp: , Rfl:     FREESTYLE EFREN 10 DAY READER Misc, TEST as directed, Disp: 3 each, Rfl: 3    gabapentin (NEURONTIN) 600 MG tablet, Take 1 tablet (600 mg total) by mouth 2 (two) times daily. (Patient taking differently: Take 600 mg by mouth once daily.), Disp: 180 tablet, Rfl: 3    hydrocortisone 2.5 % cream, Apply topically 2 (two) times daily as needed., Disp: 1 each, Rfl: 1    hyoscyamine (ANASPAZ,LEVSIN)  0.125 mg Tab, Take 1 tablet (125 mcg total) by mouth every 8 (eight) hours as needed (urgency)., Disp: 90 tablet, Rfl: 3    levothyroxine (SYNTHROID) 75 MCG tablet, Take 1 tablet (75 mcg total) by mouth before breakfast., Disp: 90 tablet, Rfl: 3    LIDOcaine (LIDODERM) 5 %, Place 1 patch onto the skin once daily. Remove & Discard patch within 12 hours or as directed by MD, Disp: 30 patch, Rfl: 0    magnesium 250 mg Tab, Take 2 capsules by mouth once daily., Disp: , Rfl:     montelukast (SINGULAIR) 10 mg tablet, TAKE 1 TABLET BY MOUTH  DAILY, Disp: 90 tablet, Rfl: 3    pantoprazole (PROTONIX) 20 MG tablet, Take 1 tablet (20 mg total) by mouth once daily., Disp: 30 tablet, Rfl: 2    polyethylene glycol (GLYCOLAX) 17 gram/dose powder, Take 17 g by mouth before dinner., Disp: 1530 g, Rfl: 1    psyllium husk, aspartame, (METAMUCIL) 3.4 gram PwPk packet, Take 1 packet by mouth 2 (two) times daily as needed (constipation)., Disp: , Rfl:     senna-docusate 8.6-50 mg (PERICOLACE) 8.6-50 mg per tablet, Take 1 tablet by mouth 2 (two) times daily as needed for Constipation., Disp: , Rfl:     vitamin D 1000 units Tab, Take 1,000 Units by mouth once daily. D3, Disp: , Rfl:     acetaminophen (TYLENOL) 650 MG TbSR, Take 650 mg by mouth once as needed (pain)., Disp: , Rfl:     butalbital-acetaminophen-caffeine -40 mg (FIORICET, ESGIC) -40 mg per tablet, 1 tablet every 8 (eight) hours as needed for Pain., Disp: , Rfl:     FREESTYLE LITE STRIPS Strp, TEST DAILY AS DIRECTED, Disp: 50 strip, Rfl: 2    ondansetron (ZOFRAN-ODT) 8 MG TbDL, Take 1 tablet (8 mg total) by mouth every 8 (eight) hours as needed (nausea). (Patient not taking: Reported on 2022), Disp: 30 tablet, Rfl: 0    Past Surgical History:   Procedure Laterality Date    APPENDECTOMY      BACK SURGERY      CARPAL TUNNEL RELEASE      bilateral      SECTION      CHOLECYSTECTOMY  1965    COLECTOMY  2011    Transverse colon  resection by Dr. Aguirre    COLONOSCOPY N/A 7/27/2017    Procedure: COLONOSCOPY;  Surgeon: Manjit Alvarez MD;  Location: [facility] (4TH FLR);  Service: Endoscopy;  Laterality: N/A;    COLONOSCOPY N/A 6/26/2019    Procedure: COLONOSCOPY;  Surgeon: Ramiro Jefferson MD;  Location: [facility] (4TH FLR);  Service: Endoscopy;  Laterality: N/A;  PM prep    CYSTOSCOPY N/A 11/9/2021    Procedure: CYSTOSCOPY;  Surgeon: Viridiana Valenzuela MD;  Location: [facility] OR 1ST FLR;  Service: Urology;  Laterality: N/A;    ENDOSCOPIC ULTRASOUND OF UPPER GASTROINTESTINAL TRACT N/A 12/12/2018    Procedure: ULTRASOUND, UPPER GI TRACT, ENDOSCOPIC;  Surgeon: Jose Hess MD;  Location: [facility];  Service: Endoscopy;  Laterality: N/A;    ENDOSCOPIC ULTRASOUND OF UPPER GASTROINTESTINAL TRACT N/A 1/23/2019    Procedure: ULTRASOUND, UPPER GI TRACT, ENDOSCOPIC;  Surgeon: Jose Hess MD;  Location: [facility] (2ND FLR);  Service: Endoscopy;  Laterality: N/A;    ESOPHAGOGASTRODUODENOSCOPY N/A 11/16/2018    Procedure: EGD (ESOPHAGOGASTRODUODENOSCOPY);  Surgeon: Angelo Reynolds MD;  Location: [facility] (2ND FLR);  Service: Endoscopy;  Laterality: N/A;    ESOPHAGOGASTRODUODENOSCOPY N/A 6/26/2019    Procedure: EGD (ESOPHAGOGASTRODUODENOSCOPY);  Surgeon: Ramiro Jefferson MD;  Location: [facility] (4TH FLR);  Service: Endoscopy;  Laterality: N/A;    EYE SURGERY      Cataract Removal    FLUOROSCOPIC URODYNAMIC STUDY N/A 11/9/2021    Procedure: URODYNAMIC STUDY, FLUOROSCOPIC;  Surgeon: Viridiana Valenzuela MD;  Location: [facility] OR 1ST FLR;  Service: Urology;  Laterality: N/A;  90 minutes     HYSTERECTOMY      posterolateral fusion with autograft bone and [place] mineralized bone matrix  2/1/13    at [facility] for lumbar spine stenosis    TOE SURGERY      TONSILLECTOMY      TRIGGER FINGER RELEASE         Social History     Tobacco Use    Smoking status: Never Smoker    Smokeless tobacco: Never Used   Substance Use Topics    Alcohol use: Yes      Comment: socially, hardly ever    Drug use: No         Objective:      Vitals:    02/17/22 1147   BP: 112/69   Pulse: 66   Temp: 98.1 °F (36.7 °C)       Physical Exam  Constitutional:       General: She is not in acute distress.     Appearance: Normal appearance. She is normal weight. She is not ill-appearing, toxic-appearing or diaphoretic.   Eyes:      Extraocular Movements: Extraocular movements intact.      Conjunctiva/sclera: Conjunctivae normal.   Cardiovascular:      Rate and Rhythm: Normal rate and regular rhythm.      Heart sounds: Normal heart sounds.   Pulmonary:      Effort: Pulmonary effort is normal. No respiratory distress.      Breath sounds: Normal breath sounds. No wheezing, rhonchi or rales.   Abdominal:      General: Abdomen is flat. Bowel sounds are normal. There is no distension.      Palpations: Abdomen is soft. There is no mass.      Tenderness: There is no abdominal tenderness. There is no guarding or rebound.      Hernia: No hernia is present.   Musculoskeletal:      Right lower leg: No edema.      Left lower leg: No edema.   Skin:     Findings: No lesion or rash.   Neurological:      General: No focal deficit present.      Mental Status: She is alert and oriented to person, place, and time.   Psychiatric:         Mood and Affect: Mood normal.         Behavior: Behavior normal.         Thought Content: Thought content normal.         Judgment: Judgment normal.           Assessment/Plan:     1) Hospital f/u, partial SBO - Doing relatively well post d/c. SBO has been a recurrent issue for a while now, but recently multiple ED visits. GI and Surg following. Planning for laparoscopy with possible TIFFANY's early March 2022. Frequent bm's currently but stool normal and feels this is helping. Will continue Miralax daily for now and f/u by phone next week. RTC 1 to 2 wks prior to surgery for pre-op eval and repeat cbc and bmp.    2) T2DM - Off meds. Uncontrolled afternoon bg with level 200 to  300's since stopping Metformin 500 TID. Agreed on restarting Metformin at 500 qd. Has bg monitor. Will f/u next week by phone to assess tolerance and effectiveness.    3) UTI - Resolved after course of Zosyn in hospital with Cipro completed outpt today.

## 2022-02-18 ENCOUNTER — PES CALL (OUTPATIENT)
Dept: HOME HEALTH SERVICES | Facility: CLINIC | Age: 77
End: 2022-02-18
Payer: MEDICARE

## 2022-02-25 ENCOUNTER — TELEPHONE (OUTPATIENT)
Dept: SURGERY | Facility: CLINIC | Age: 77
End: 2022-02-25
Payer: MEDICARE

## 2022-02-25 NOTE — TELEPHONE ENCOUNTER
Returned pt phone call in regards to her arrival time for surgery on 3/4.  Informed pt that we do no have an arrival time confirmed, but we would call next week with that information.  Pt understands and is ok with plan.

## 2022-02-25 NOTE — TELEPHONE ENCOUNTER
----- Message from Leilani Wilson sent at 2/25/2022  9:06 AM CST -----  Regarding: procedure  Contact: pt  Pt requesting a call back in regards to upcoming appt.      Pt @ [phone]

## 2022-02-26 ENCOUNTER — DOCUMENT SCAN (OUTPATIENT)
Dept: HOME HEALTH SERVICES | Facility: HOSPITAL | Age: 77
End: 2022-02-26
Payer: MEDICARE

## 2022-02-28 ENCOUNTER — DOCUMENT SCAN (OUTPATIENT)
Dept: HOME HEALTH SERVICES | Facility: HOSPITAL | Age: 77
End: 2022-02-28
Payer: MEDICARE

## 2022-02-28 ENCOUNTER — DOCUMENT SCAN (OUTPATIENT)
Dept: HOME HEALTH SERVICES | Facility: HOSPITAL | Age: 77
End: 2022-02-28

## 2022-03-02 ENCOUNTER — OFFICE VISIT (OUTPATIENT)
Dept: INTERNAL MEDICINE | Facility: CLINIC | Age: 77
DRG: 337 | End: 2022-03-02
Payer: MEDICARE

## 2022-03-02 VITALS
BODY MASS INDEX: 23.91 KG/M2 | HEART RATE: 64 BPM | DIASTOLIC BLOOD PRESSURE: 67 MMHG | SYSTOLIC BLOOD PRESSURE: 104 MMHG | WEIGHT: 130.75 LBS | TEMPERATURE: 98 F

## 2022-03-02 DIAGNOSIS — E03.4 HYPOTHYROIDISM DUE TO ACQUIRED ATROPHY OF THYROID: ICD-10-CM

## 2022-03-02 DIAGNOSIS — D46.9 MYELODYSPLASTIC SYNDROME: ICD-10-CM

## 2022-03-02 DIAGNOSIS — K56.609 SBO (SMALL BOWEL OBSTRUCTION): ICD-10-CM

## 2022-03-02 DIAGNOSIS — Z01.818 PRE-OP EVALUATION: Primary | ICD-10-CM

## 2022-03-02 DIAGNOSIS — E11.9 TYPE 2 DIABETES MELLITUS WITHOUT COMPLICATION, WITHOUT LONG-TERM CURRENT USE OF INSULIN: ICD-10-CM

## 2022-03-02 LAB
ANION GAP SERPL CALC-SCNC: 14 MMOL/L (ref 8–16)
BASOPHILS # BLD AUTO: 0.05 K/UL (ref 0–0.2)
BASOPHILS NFR BLD: 0.9 % (ref 0–1.9)
BUN SERPL-MCNC: 14 MG/DL (ref 8–23)
CALCIUM SERPL-MCNC: 9.9 MG/DL (ref 8.7–10.5)
CHLORIDE SERPL-SCNC: 98 MMOL/L (ref 95–110)
CO2 SERPL-SCNC: 24 MMOL/L (ref 23–29)
CREAT SERPL-MCNC: 0.8 MG/DL (ref 0.5–1.4)
DIFFERENTIAL METHOD: ABNORMAL
EOSINOPHIL # BLD AUTO: 0.3 K/UL (ref 0–0.5)
EOSINOPHIL NFR BLD: 5.1 % (ref 0–8)
ERYTHROCYTE [DISTWIDTH] IN BLOOD BY AUTOMATED COUNT: 13 % (ref 11.5–14.5)
EST. GFR  (AFRICAN AMERICAN): >60 ML/MIN/1.73 M^2
EST. GFR  (NON AFRICAN AMERICAN): >60 ML/MIN/1.73 M^2
GLUCOSE SERPL-MCNC: 166 MG/DL (ref 70–110)
HCT VFR BLD AUTO: 37.8 % (ref 37–48.5)
HGB BLD-MCNC: 12.3 G/DL (ref 12–16)
IMM GRANULOCYTES # BLD AUTO: 0.02 K/UL (ref 0–0.04)
IMM GRANULOCYTES NFR BLD AUTO: 0.4 % (ref 0–0.5)
LYMPHOCYTES # BLD AUTO: 1.5 K/UL (ref 1–4.8)
LYMPHOCYTES NFR BLD: 26.4 % (ref 18–48)
MCH RBC QN AUTO: 33 PG (ref 27–31)
MCHC RBC AUTO-ENTMCNC: 32.5 G/DL (ref 32–36)
MCV RBC AUTO: 101 FL (ref 82–98)
MONOCYTES # BLD AUTO: 0.5 K/UL (ref 0.3–1)
MONOCYTES NFR BLD: 9.3 % (ref 4–15)
NEUTROPHILS # BLD AUTO: 3.2 K/UL (ref 1.8–7.7)
NEUTROPHILS NFR BLD: 57.9 % (ref 38–73)
NRBC BLD-RTO: 0 /100 WBC
PLATELET # BLD AUTO: 94 K/UL (ref 150–450)
PMV BLD AUTO: 11.7 FL (ref 9.2–12.9)
POTASSIUM SERPL-SCNC: 4.5 MMOL/L (ref 3.5–5.1)
RBC # BLD AUTO: 3.73 M/UL (ref 4–5.4)
SODIUM SERPL-SCNC: 136 MMOL/L (ref 136–145)
WBC # BLD AUTO: 5.49 K/UL (ref 3.9–12.7)

## 2022-03-02 PROCEDURE — 99999 PR PBB SHADOW E&M-EST. PATIENT-LVL III: ICD-10-PCS | Mod: PBBFAC,,, | Performed by: INTERNAL MEDICINE

## 2022-03-02 PROCEDURE — 99213 OFFICE O/P EST LOW 20 MIN: CPT | Mod: S$PBB,,, | Performed by: INTERNAL MEDICINE

## 2022-03-02 PROCEDURE — 99999 PR PBB SHADOW E&M-EST. PATIENT-LVL III: CPT | Mod: PBBFAC,,, | Performed by: INTERNAL MEDICINE

## 2022-03-02 PROCEDURE — 99213 OFFICE O/P EST LOW 20 MIN: CPT | Mod: PBBFAC | Performed by: INTERNAL MEDICINE

## 2022-03-02 PROCEDURE — 85025 COMPLETE CBC W/AUTO DIFF WBC: CPT | Performed by: INTERNAL MEDICINE

## 2022-03-02 PROCEDURE — 80048 BASIC METABOLIC PNL TOTAL CA: CPT | Performed by: INTERNAL MEDICINE

## 2022-03-02 PROCEDURE — 99213 PR OFFICE/OUTPT VISIT, EST, LEVL III, 20-29 MIN: ICD-10-PCS | Mod: S$PBB,,, | Performed by: INTERNAL MEDICINE

## 2022-03-02 NOTE — PROGRESS NOTES
Subjective:       Patient ID: Anayeli Judd is a 77 y.o. female.    Chief Complaint: Pre-op Exam      HPI:  Gen Surg planning for exploratory lap with TIFFANY for recurrent SBO on 3/4/22. Past several months has had several ED visits and hospital admissions, most recently for partial SBO which resolved without surgical need. Since recent d/c on 22, has been doing relatively well. Eating well, bowels moving without issue, no pain. Diabetes controlled this past week on Metformin 500 bid. Had started qd for bg levels consistently above 200. Started extra dosing on her own last week since still elevated. Since that time has had no level above 200.    Hx of myelodysplastic syndrome with mild/moderate anemia and thrombocytopenia. No recent bleeding or increased bruising.    Hypothyroidism hx with recent TSH controlled on levo 75 qam.    Review of Systems   Constitutional: Negative for chills, fatigue and fever.   Respiratory: Negative for cough, chest tightness and shortness of breath.    Cardiovascular: Negative for chest pain and leg swelling.   Gastrointestinal: Negative for abdominal pain, blood in stool, constipation, diarrhea, nausea and vomiting.   Neurological: Negative for syncope and light-headedness.       Past Medical History:   Diagnosis Date    Abdominal pain     Allergic rhinitis     Arthritis     Blood platelet disorder     Blood platelet disorder     Blood transfusion     during delivery and     Bowel obstruction     Cervical radiculopathy     followed by dr cloud    Colon cancer     transverse colon; resected; Stage IIA (pT3 pN0 MX)    Diabetes mellitus     Diarrhea     Falls 2020    Family history of breast cancer     Family history of colon cancer     Fatty liver     GERD (gastroesophageal reflux disease)     History of shingles     Hyperlipidemia     Hypothyroidism     Irritable bowel syndrome     Microscopic colitis     treated     Myelodysplastic  syndrome     Raynaud phenomenon     Raynaud's disease     Type 2 diabetes mellitus          Current Outpatient Medications:     acetaminophen (TYLENOL) 650 MG TbSR, Take 650 mg by mouth once as needed (pain)., Disp: , Rfl:     ascorbic acid, vitamin C, (VITAMIN C) 1000 MG tablet, Take 1,000 mg by mouth once daily., Disp: , Rfl:     atorvastatin (LIPITOR) 40 MG tablet, TAKE 1 TABLET BY MOUTH  DAILY, Disp: 90 tablet, Rfl: 3    azelastine (ASTELIN) 137 mcg (0.1 %) nasal spray, 2 sprays (274 mcg total) by Nasal route 2 (two) times daily as needed for Rhinitis., Disp: , Rfl:     biotin 10,000 mcg TbDL, Take 1 tablet by mouth once daily. , Disp: , Rfl:     conjugated estrogens (PREMARIN) vaginal cream, INSERT 1/2 GRAM VAGINALLY  TWICE WEEKLY, Disp: 30 g, Rfl: 3    cranberry extract 200 mg Cap, Take 1 capsule by mouth once daily., Disp: , Rfl:     D-MANNOSE ORAL, Take 1 tablet by mouth once daily. , Disp: , Rfl:     DULoxetine (CYMBALTA) 30 MG capsule, Take 1 capsule (30 mg total) by mouth 2 (two) times daily., Disp: 180 capsule, Rfl: 3    flash glucose sensor (FREESTYLE EFREN 14 DAY SENSOR) Kit, 1 application by Misc.(Non-Drug; Combo Route) route every 14 (fourteen) days. Disp 30 or 90 day refill, Disp: 6 kit, Rfl: 6    fluticasone propionate (FLONASE) 50 mcg/actuation nasal spray, 1 spray by Each Nostril route daily as needed for Rhinitis., Disp: , Rfl:     FREESTYLE EFREN 10 DAY READER [facility], TEST as directed, Disp: 3 each, Rfl: 3    FREESTYLE LITE STRIPS Strp, TEST DAILY AS DIRECTED, Disp: 50 strip, Rfl: 2    gabapentin (NEURONTIN) 600 MG tablet, Take 1 tablet (600 mg total) by mouth 2 (two) times daily. (Patient taking differently: Take 600 mg by mouth once daily.), Disp: 180 tablet, Rfl: 3    hydrocortisone 2.5 % cream, Apply topically 2 (two) times daily as needed., Disp: 1 each, Rfl: 1    hyoscyamine (ANASPAZ,LEVSIN) 0.125 mg Tab, Take 1 tablet (125 mcg total) by mouth every 8 (eight) hours as  needed (urgency)., Disp: 90 tablet, Rfl: 3    levothyroxine (SYNTHROID) 75 MCG tablet, Take 1 tablet (75 mcg total) by mouth before breakfast., Disp: 90 tablet, Rfl: 3    LIDOcaine (LIDODERM) 5 %, Place 1 patch onto the skin once daily. Remove & Discard patch within 12 hours or as directed by MD, Disp: 30 patch, Rfl: 0    magnesium 250 mg Tab, Take 1,000 mg by mouth once daily., Disp: , Rfl:     montelukast (SINGULAIR) 10 mg tablet, TAKE 1 TABLET BY MOUTH  DAILY, Disp: 90 tablet, Rfl: 3    ondansetron (ZOFRAN-ODT) 8 MG TbDL, Take 1 tablet (8 mg total) by mouth every 8 (eight) hours as needed (nausea)., Disp: 30 tablet, Rfl: 0    pantoprazole (PROTONIX) 20 MG tablet, Take 1 tablet (20 mg total) by mouth once daily., Disp: 30 tablet, Rfl: 2    polyethylene glycol (GLYCOLAX) 17 gram/dose powder, Take 17 g by mouth before dinner., Disp: 1530 g, Rfl: 1    psyllium husk, aspartame, (METAMUCIL) 3.4 gram PwPk packet, Take 1 packet by mouth 2 (two) times daily as needed (constipation)., Disp: , Rfl:     senna-docusate 8.6-50 mg (PERICOLACE) 8.6-50 mg per tablet, Take 1 tablet by mouth 2 (two) times daily as needed for Constipation., Disp: , Rfl:     vitamin D 1000 units Tab, Take 1,000 Units by mouth once daily. D3, Disp: , Rfl:     co-enzyme Q-10 30 mg capsule, Take 30 mg by mouth 3 (three) times daily., Disp: , Rfl:     Past Surgical History:   Procedure Laterality Date    APPENDECTOMY      BACK SURGERY      CARPAL TUNNEL RELEASE      bilateral      SECTION      CHOLECYSTECTOMY  1965    COLECTOMY  2011    Transverse colon resection by Dr. Aguirre    COLONOSCOPY N/A 2017    Procedure: COLONOSCOPY;  Surgeon: Manjit Alvarez MD;  Location: [facility] ([address] [place] [address]);  Service: Endoscopy;  Laterality: N/A;    COLONOSCOPY N/A 2019    Procedure: COLONOSCOPY;  Surgeon: Ramiro Jefferson MD;  Location: [facility] (4TH FLR);  Service: Endoscopy;  Laterality: N/A;  PM prep    CYSTOSCOPY N/A  11/9/2021    Procedure: CYSTOSCOPY;  Surgeon: Viridiana Valenzuela MD;  Location: [facility] OR 1ST FLR;  Service: Urology;  Laterality: N/A;    ENDOSCOPIC ULTRASOUND OF UPPER GASTROINTESTINAL TRACT N/A 12/12/2018    Procedure: ULTRASOUND, UPPER GI TRACT, ENDOSCOPIC;  Surgeon: Jose Hess MD;  Location: [facility] ENDO;  Service: Endoscopy;  Laterality: N/A;    ENDOSCOPIC ULTRASOUND OF UPPER GASTROINTESTINAL TRACT N/A 1/23/2019    Procedure: ULTRASOUND, UPPER GI TRACT, ENDOSCOPIC;  Surgeon: Jose Hess MD;  Location: [facility] ENDO (2ND FLR);  Service: Endoscopy;  Laterality: N/A;    ESOPHAGOGASTRODUODENOSCOPY N/A 11/16/2018    Procedure: EGD (ESOPHAGOGASTRODUODENOSCOPY);  Surgeon: Angelo Reynolds MD;  Location: [facility] ENDO (2ND FLR);  Service: Endoscopy;  Laterality: N/A;    ESOPHAGOGASTRODUODENOSCOPY N/A 6/26/2019    Procedure: EGD (ESOPHAGOGASTRODUODENOSCOPY);  Surgeon: Ramiro Jefferson MD;  Location: [facility] (4TH FLR);  Service: Endoscopy;  Laterality: N/A;    EYE SURGERY      Cataract Removal    FLUOROSCOPIC URODYNAMIC STUDY N/A 11/9/2021    Procedure: URODYNAMIC STUDY, FLUOROSCOPIC;  Surgeon: Viridiana Valenzuela MD;  Location: [facility] OR 1ST FLR;  Service: Urology;  Laterality: N/A;  90 minutes     HYSTERECTOMY      posterolateral fusion with autograft bone and Eun mineralized bone matrix  2/1/13    at [facility] for lumbar spine stenosis    TOE SURGERY      TONSILLECTOMY      TRIGGER FINGER RELEASE         Social History     Tobacco Use    Smoking status: Never Smoker    Smokeless tobacco: Never Used   Substance Use Topics    Alcohol use: Yes     Comment: socially, hardly ever    Drug use: No         Objective:      Vitals:    03/02/22 1304   BP: 104/67   Pulse: 64   Temp: 97.8 °F (36.6 °C)       Physical Exam  Constitutional:       General: She is not in acute distress.     Appearance: Normal appearance. She is normal weight. She is not ill-appearing, toxic-appearing or diaphoretic.   HENT:      Head:  Normocephalic and atraumatic.      Right Ear: Tympanic membrane normal. There is no impacted cerumen.      Left Ear: Tympanic membrane normal. There is no impacted cerumen.   Eyes:      Extraocular Movements: Extraocular movements intact.      Conjunctiva/sclera: Conjunctivae normal.   Cardiovascular:      Rate and Rhythm: Normal rate and regular rhythm.      Heart sounds: Normal heart sounds.   Pulmonary:      Effort: Pulmonary effort is normal. No respiratory distress.      Breath sounds: Normal breath sounds. No wheezing, rhonchi or rales.   Abdominal:      General: Abdomen is flat. Bowel sounds are normal. There is no distension.      Palpations: Abdomen is soft. There is no mass.      Tenderness: There is no abdominal tenderness. There is no guarding or rebound.      Hernia: No hernia is present.   Musculoskeletal:         General: No swelling or deformity.      Cervical back: Normal range of motion.      Right lower leg: No edema.      Left lower leg: No edema.   Neurological:      General: No focal deficit present.      Mental Status: She is alert and oriented to person, place, and time.      Gait: Gait normal.   Psychiatric:         Mood and Affect: Mood normal.         Behavior: Behavior normal.         Thought Content: Thought content normal.         Judgment: Judgment normal.           Recent Results (from the past 24 hour(s))   Basic Metabolic Panel    Collection Time: 03/02/22  1:42 PM   Result Value Ref Range    Sodium 136 136 - 145 mmol/L    Potassium 4.5 3.5 - 5.1 mmol/L    Chloride 98 95 - 110 mmol/L    CO2 24 23 - 29 mmol/L    Glucose 166 (H) 70 - 110 mg/dL    BUN 14 8 - 23 mg/dL    Creatinine 0.8 0.5 - 1.4 mg/dL    Calcium 9.9 8.7 - 10.5 mg/dL    Anion Gap 14 8 - 16 mmol/L    eGFR if African American >60.0 >60 mL/min/1.73 m^2    eGFR if non African American >60.0 >60 mL/min/1.73 m^2   CBC Auto Differential    Collection Time: 03/02/22  1:42 PM   Result Value Ref Range    WBC 5.49 3.90 - 12.70 K/uL     RBC 3.73 (L) 4.00 - 5.40 M/uL    Hemoglobin 12.3 12.0 - 16.0 g/dL    Hematocrit 37.8 37.0 - 48.5 %     (H) 82 - 98 fL    MCH 33.0 (H) 27.0 - 31.0 pg    MCHC 32.5 32.0 - 36.0 g/dL    RDW 13.0 11.5 - 14.5 %    Platelets 94 (L) 150 - 450 K/uL    MPV 11.7 9.2 - 12.9 fL    Immature Granulocytes 0.4 0.0 - 0.5 %    Gran # (ANC) 3.2 1.8 - 7.7 K/uL    Immature Grans (Abs) 0.02 0.00 - 0.04 K/uL    Lymph # 1.5 1.0 - 4.8 K/uL    Mono # 0.5 0.3 - 1.0 K/uL    Eos # 0.3 0.0 - 0.5 K/uL    Baso # 0.05 0.00 - 0.20 K/uL    nRBC 0 0 /100 WBC    Gran % 57.9 38.0 - 73.0 %    Lymph % 26.4 18.0 - 48.0 %    Mono % 9.3 4.0 - 15.0 %    Eosinophil % 5.1 0.0 - 8.0 %    Basophil % 0.9 0.0 - 1.9 %    Differential Method Automated        EKG (1/12/22):  Sinus rhythm with sinus arrhythmia with short KY   Nonspecific ST and/or T wave abnormalities   Abnormal ECG   When compared with ECG of 01-JAN-2022 22:03,   No significant change was found     Assessment/Plan:     1) Pre-op exam: Recurrent SBO, planning for ex-lap with TIFFANY on 3/4/22  2) T2DM - Controlled on Metformin 500 bid. Plan to hold and manage with insulin if needed sam-operatively.  3) Hypothyroidism - TSH wnl less than 2 months ago on Levothyroxine 75 mcg qam.  4) Myelodysplastic syndrome - Stable with moderate thrombocytopenia just below 100, h/h wnl.    - Okay to proceed with planned surgery. Monitor bg and cbc closely sam-operatively.

## 2022-03-03 ENCOUNTER — ANESTHESIA EVENT (OUTPATIENT)
Dept: SURGERY | Facility: HOSPITAL | Age: 77
DRG: 337 | End: 2022-03-03
Payer: MEDICARE

## 2022-03-03 ENCOUNTER — TELEPHONE (OUTPATIENT)
Dept: SURGERY | Facility: CLINIC | Age: 77
End: 2022-03-03
Payer: MEDICARE

## 2022-03-03 RX ORDER — METFORMIN HYDROCHLORIDE 500 MG/1
500 TABLET ORAL 2 TIMES DAILY WITH MEALS
COMMUNITY
End: 2022-05-04

## 2022-03-03 NOTE — ANESTHESIA PREPROCEDURE EVALUATION
[facility]  Anesthesia Pre-Operative Evaluation         Patient Name: Anayeli Judd  YOB: 1945  MRN: [id number]    SUBJECTIVE:     Pre-operative evaluation for Procedure(s) (LRB):  LAPAROTOMY, EXPLORATORY Lysis of adhesion, (N/A)     03/03/2022    Anayeli Judd is a 77 y.o. female w/ a significant PMHx of NIDDM, GERD, Myelodysplastic syndrome who presents for the above procedure.      LDA: None documented.    Prev airway:   Method of Intubation: Direct laryngoscopy; Inserted by: CRNA; Airway Device: Endotracheal Tube; Mask Ventilation: Other (rsi); Blade: Noriega #2; Airway Device Size: 7.0; Style: Cuffed; Cuff Inflation: Minimal occlusive pressure; Inflation Amount: 4; Placement Verified By: Auscultation, Capnometry; Grade: Grade I; Complicating Factors: None; Intubation Findings: Positive EtCO2, Bilateral breath sounds, Atraumatic/Condition of teeth unchanged;  Depth of Insertion: 21; Securment: Lips; Complications: None; Breath Sounds: Equal Bilateral; Insertion Attempts: 1; Removal Date: 09/02/14;  Removal Time: 1356    Drips: None documented.    Patient Active Problem List   Diagnosis    Lumbar spondylosis    Cervical spondylosis with radiculopathy    Insomnia    Carpal tunnel syndrome    Headache(784.0)    Hypothyroid    History of colon cancer    Irritable bowel syndrome with both constipation and diarrhea    Chronic sinusitis of right maxilla    Diarrhea    Pelvic muscle wasting    GERD (gastroesophageal reflux disease)    Fatty liver    Partial small bowel obstruction    Family history of breast cancer    Numbness of face    Gait instability    Type 2 diabetes mellitus without complication, without long-term current use of insulin    Acute deep vein thrombosis (DVT) of upper extremity    Wound infection after surgery    Hematoma    Chronic diarrhea    Fecal incontinence    Generalized abdominal pain    Nausea    Vitamin B12 deficiency     Vaginal atrophy    Incomplete emptying of bladder    Mixed stress and urge urinary incontinence    Vaginal irritation    Fecal soiling    Irregular bowel habits    Elevated serum creatinine    Hypomagnesemia    Hyponatremia    Hydronephrosis    Acute cystitis without hematuria    Prolapse of female genital organs    Rectocele    Iron deficiency anemia due to chronic blood loss    Postmenopausal bleeding    SBO (small bowel obstruction)    Thrombocytopenia    Myelodysplastic syndrome    Constipation    Bilious vomiting with nausea    Parkinsonism    Memory change    Other chest pain    Atrophic pancreas    Other hyperlipidemia    Type 2 diabetes mellitus with diabetic peripheral angiopathy without gangrene, without long-term current use of insulin    Abdominal discomfort    Anxiety    UTI (urinary tract infection)    Elevated liver enzymes       Review of patient's allergies indicates:   Allergen Reactions    Codeine Itching and Nausea And Vomiting    Dilaudid [hydromorphone (bulk)] Other (See Comments)     Oversedating, head burning. Pt prefers to avoid.       Percocet [oxycodone-acetaminophen] Itching    Sulfa (sulfonamide antibiotics) Itching and Nausea And Vomiting              Current Inpatient Medications:      No current facility-administered medications on file prior to encounter.     Current Outpatient Medications on File Prior to Encounter   Medication Sig Dispense Refill    atorvastatin (LIPITOR) 40 MG tablet TAKE 1 TABLET BY MOUTH  DAILY 90 tablet 3    DULoxetine (CYMBALTA) 30 MG capsule Take 1 capsule (30 mg total) by mouth 2 (two) times daily. 180 capsule 3    levothyroxine (SYNTHROID) 75 MCG tablet Take 1 tablet (75 mcg total) by mouth before breakfast. 90 tablet 3    metFORMIN (GLUCOPHAGE) 500 MG tablet Take 500 mg by mouth 2 (two) times daily with meals.      montelukast (SINGULAIR) 10 mg tablet TAKE 1 TABLET BY MOUTH  DAILY 90 tablet 3    pantoprazole  (PROTONIX) 20 MG tablet Take 1 tablet (20 mg total) by mouth once daily. 30 tablet 2    ascorbic acid, vitamin C, (VITAMIN C) 1000 MG tablet Take 1,000 mg by mouth once daily.      biotin 10,000 mcg TbDL Take 1 tablet by mouth once daily.       co-enzyme Q-10 30 mg capsule Take 30 mg by mouth 3 (three) times daily.      conjugated estrogens (PREMARIN) vaginal cream INSERT 1/2 GRAM VAGINALLY  TWICE WEEKLY 30 g 3    cranberry extract 200 mg Cap Take 1 capsule by mouth once daily.      D-MANNOSE ORAL Take 1 tablet by mouth once daily.       flash glucose sensor (FREESTYLE EFREN 14 DAY SENSOR) Kit 1 application by Misc.(Non-Drug; Combo Route) route every 14 (fourteen) days. Disp 30 or 90 day refill 6 kit 6    FREESTYLE EFREN 10 DAY READER [facility] TEST as directed 3 each 3    FREESTYLE LITE STRIPS Strp TEST DAILY AS DIRECTED 50 strip 2    hydrocortisone 2.5 % cream Apply topically 2 (two) times daily as needed. 1 each 1    hyoscyamine (ANASPAZ,LEVSIN) 0.125 mg Tab Take 1 tablet (125 mcg total) by mouth every 8 (eight) hours as needed (urgency). 90 tablet 3    LIDOcaine (LIDODERM) 5 % Place 1 patch onto the skin once daily. Remove & Discard patch within 12 hours or as directed by MD 30 patch 0    magnesium 250 mg Tab Take 1,000 mg by mouth once daily.      ondansetron (ZOFRAN-ODT) 8 MG TbDL Take 1 tablet (8 mg total) by mouth every 8 (eight) hours as needed (nausea). 30 tablet 0    polyethylene glycol (GLYCOLAX) 17 gram/dose powder Take 17 g by mouth before dinner. 1530 g 1    vitamin D 1000 units Tab Take 1,000 Units by mouth once daily. D3         Past Surgical History:   Procedure Laterality Date    APPENDECTOMY      BACK SURGERY      CARPAL TUNNEL RELEASE      bilateral      SECTION      CHOLECYSTECTOMY  1965    COLECTOMY  2011    Transverse colon resection by Dr. Aguirre    COLONOSCOPY N/A 2017    Procedure: COLONOSCOPY;  Surgeon: Manjit Alvarez MD;  Location: [facility] ([facility]  FLR);  Service: Endoscopy;  Laterality: N/A;    COLONOSCOPY N/A 6/26/2019    Procedure: COLONOSCOPY;  Surgeon: Ramiro Jefferson MD;  Location: [facility] (4TH FLR);  Service: Endoscopy;  Laterality: N/A;  PM prep    CYSTOSCOPY N/A 11/9/2021    Procedure: CYSTOSCOPY;  Surgeon: Viridiana Valenzuela MD;  Location: [facility] OR 1ST FLR;  Service: Urology;  Laterality: N/A;    ENDOSCOPIC ULTRASOUND OF UPPER GASTROINTESTINAL TRACT N/A 12/12/2018    Procedure: ULTRASOUND, UPPER GI TRACT, ENDOSCOPIC;  Surgeon: Jose Hess MD;  Location: [facility] ENDO;  Service: Endoscopy;  Laterality: N/A;    ENDOSCOPIC ULTRASOUND OF UPPER GASTROINTESTINAL TRACT N/A 1/23/2019    Procedure: ULTRASOUND, UPPER GI TRACT, ENDOSCOPIC;  Surgeon: Jose Hess MD;  Location: [facility] (2ND FLR);  Service: Endoscopy;  Laterality: N/A;    ESOPHAGOGASTRODUODENOSCOPY N/A 11/16/2018    Procedure: EGD (ESOPHAGOGASTRODUODENOSCOPY);  Surgeon: Angelo Reynolds MD;  Location: [facility] (2ND FLR);  Service: Endoscopy;  Laterality: N/A;    ESOPHAGOGASTRODUODENOSCOPY N/A 6/26/2019    Procedure: EGD (ESOPHAGOGASTRODUODENOSCOPY);  Surgeon: Ramiro Jefferson MD;  Location: [facility] (4TH FLR);  Service: Endoscopy;  Laterality: N/A;    EYE SURGERY      Cataract Removal    FLUOROSCOPIC URODYNAMIC STUDY N/A 11/9/2021    Procedure: URODYNAMIC STUDY, FLUOROSCOPIC;  Surgeon: Viridiana Valenzuela MD;  Location: [facility] OR 1ST FLR;  Service: Urology;  Laterality: N/A;  90 minutes     HYSTERECTOMY      posterolateral fusion with autograft bone and Eun mineralized bone matrix  2/1/13    at [facility] for lumbar spine stenosis    TOE SURGERY      TONSILLECTOMY      TRIGGER FINGER RELEASE         Social History     Socioeconomic History    Marital status:    Tobacco Use    Smoking status: Never Smoker    Smokeless tobacco: Never Used   Substance and Sexual Activity    Alcohol use: Yes     Comment: socially, hardly ever    Drug use: No    Sexual activity:  Not Currently   Social History Narrative    , lives alone.  Primary support are her children and friends.       OBJECTIVE:     Vital Signs Range (Last 24H):         CBC:   Recent Labs     03/02/22  1342   WBC 5.49   RBC 3.73*   HGB 12.3   HCT 37.8   PLT 94*   *   MCH 33.0*   MCHC 32.5       CMP:   Recent Labs     03/02/22  1342      K 4.5   CL 98   CO2 24   BUN 14   CREATININE 0.8   *   CALCIUM 9.9       INR:  No results for input(s): PT, INR, PROTIME, APTT in the last 72 hours.    Diagnostic Studies: No relevant studies.    EKG:   Results for orders placed or performed during the hospital encounter of 01/12/22   EKG 12-lead    Collection Time: 01/12/22 12:35 PM    Narrative    Test Reason : R10.9,    Vent. Rate : 069 BPM     Atrial Rate : 069 BPM     P-R Int : 110 ms          QRS Dur : 082 ms      QT Int : 416 ms       P-R-T Axes : 040 067 053 degrees     QTc Int : 445 ms    Sinus rhythm with sinus arrhythmia with short KY  Nonspecific ST and/or T wave abnormalities  Abnormal ECG  When compared with ECG of 01-JAN-2022 22:03,  No significant change was found  Confirmed by Neela Platt MD ([id number]) on 1/12/2022 1:40:52 PM    Referred By: [email or web address]   SELF           Confirmed By:Neela Platt MD        2D ECHO:   No results found for this or any previous visit.         ASSESSMENT/PLAN:       Pre-op Assessment    I have reviewed the Patient Summary Reports.     I have reviewed the Nursing Notes.    I have reviewed the Medications.     Review of Systems  Anesthesia Hx:  No problems with previous Anesthesia Denies Hx of Anesthetic complications  History of prior surgery of interest to airway management or planning: Denies Family Hx of Anesthesia complications.   Denies Personal Hx of Anesthesia complications.   Social:  Non-Smoker    Hematology/Oncology:     Oncology Normal     Cardiovascular:   Denies Hypertension.   Denies Angina. ECG has been reviewed.    Pulmonary:   Denies Shortness of breath.   Denies Recent URI.    Renal/:   Chronic Renal Disease, CRI    Hepatic/GI:   GERD Denies Liver Disease.    Musculoskeletal:   Arthritis     Neurological:   Denies CVA. Neuromuscular Disease,  Headaches Denies Seizures.    Endocrine:   Diabetes Hypothyroidism        Physical Exam  General: Well nourished, Cooperative and Alert    Airway:  Mallampati: II   Mouth Opening: Normal  TM Distance: Normal  Tongue: Normal  Neck ROM: Normal ROM    Dental:  Intact    Patient reported that years ago her blood pressure dropped very low during general anesthesia.She stated that she has had surgery since then with no issues.    Anesthesia Plan  Type of Anesthesia, risks & benefits discussed:    Anesthesia Type: Gen ETT, MAC, Regional  Intra-op Monitoring Plan: Standard ASA Monitors  Post Op Pain Control Plan: multimodal analgesia  Induction:  IV  Airway Plan: Direct, Post-Induction  Informed Consent: Informed consent signed with the Patient and all parties understand the risks and agree with anesthesia plan.  All questions answered.   ASA Score: 3  Day of Surgery Review of History & Physical: H&P Update referred to the surgeon/provider.    Ready For Surgery From Anesthesia Perspective.     .

## 2022-03-03 NOTE — PRE-PROCEDURE INSTRUCTIONS
PREOP INSTRUCTIONS:Nothing to eat or drink for 8 hours before surgery.Instructed to follow the surgeon's instructions if they differ from these.Shower instructions as well as directions to the Surgery Center were given.Encouraged to wear loose fitting,comfortable clothing.Medication instructions for pm prior to and am of procedure reviewed.Instructed to avoid taking vitamins,supplements,aspirin and ibuprofen the morning of surgery.    Informed of the current updated visitor policy allowing one adult support person pre and post procedure.The support person may remain with the patient prior to their procedure and must then wait in a socially distanced manor until a member of the medical team provides an update at the conclusion of the procedure.

## 2022-03-04 ENCOUNTER — DOCUMENT SCAN (OUTPATIENT)
Dept: HOME HEALTH SERVICES | Facility: HOSPITAL | Age: 77
End: 2022-03-04
Payer: MEDICARE

## 2022-03-04 ENCOUNTER — HOSPITAL ENCOUNTER (INPATIENT)
Facility: HOSPITAL | Age: 77
LOS: 1 days | Discharge: HOME | DRG: 337 | End: 2022-03-05
Attending: SURGERY | Admitting: SURGERY
Payer: MEDICARE

## 2022-03-04 ENCOUNTER — ANESTHESIA (OUTPATIENT)
Dept: SURGERY | Facility: HOSPITAL | Age: 77
DRG: 337 | End: 2022-03-04
Payer: MEDICARE

## 2022-03-04 DIAGNOSIS — K56.50 SMALL BOWEL OBSTRUCTION DUE TO ADHESIONS: ICD-10-CM

## 2022-03-04 DIAGNOSIS — E11.9 TYPE 2 DIABETES MELLITUS WITHOUT COMPLICATION, WITHOUT LONG-TERM CURRENT USE OF INSULIN: Primary | Chronic | ICD-10-CM

## 2022-03-04 DIAGNOSIS — R74.8 ELEVATED LIVER ENZYMES: ICD-10-CM

## 2022-03-04 DIAGNOSIS — K66.0 ADHESION OF ABDOMINAL WALL: ICD-10-CM

## 2022-03-04 DIAGNOSIS — R26.81 GAIT INSTABILITY: Chronic | ICD-10-CM

## 2022-03-04 LAB
CTP QC/QA: YES
POCT GLUCOSE: 166 MG/DL (ref 70–110)
POCT GLUCOSE: 223 MG/DL (ref 70–110)
SARS-COV-2 AG RESP QL IA.RAPID: NEGATIVE

## 2022-03-04 PROCEDURE — S0030 INJECTION, METRONIDAZOLE: HCPCS | Performed by: STUDENT IN AN ORGANIZED HEALTH CARE EDUCATION/TRAINING PROGRAM

## 2022-03-04 PROCEDURE — 94761 N-INVAS EAR/PLS OXIMETRY MLT: CPT

## 2022-03-04 PROCEDURE — 36000707: Performed by: SURGERY

## 2022-03-04 PROCEDURE — 64999 UNLISTED PX NERVOUS SYSTEM: CPT | Mod: ,,, | Performed by: ANESTHESIOLOGY

## 2022-03-04 PROCEDURE — 36000706: Performed by: SURGERY

## 2022-03-04 PROCEDURE — 25000003 PHARM REV CODE 250: Performed by: STUDENT IN AN ORGANIZED HEALTH CARE EDUCATION/TRAINING PROGRAM

## 2022-03-04 PROCEDURE — 25000003 PHARM REV CODE 250: Performed by: ANESTHESIOLOGY

## 2022-03-04 PROCEDURE — 25000003 PHARM REV CODE 250

## 2022-03-04 PROCEDURE — 71000033 HC RECOVERY, INTIAL HOUR: Performed by: SURGERY

## 2022-03-04 PROCEDURE — 63600175 PHARM REV CODE 636 W HCPCS: Performed by: SURGERY

## 2022-03-04 PROCEDURE — 71000016 HC POSTOP RECOV ADDL HR: Performed by: SURGERY

## 2022-03-04 PROCEDURE — 37000008 HC ANESTHESIA 1ST 15 MINUTES: Performed by: SURGERY

## 2022-03-04 PROCEDURE — D9220A PRA ANESTHESIA: Mod: ,,, | Performed by: ANESTHESIOLOGY

## 2022-03-04 PROCEDURE — 71000015 HC POSTOP RECOV 1ST HR: Performed by: SURGERY

## 2022-03-04 PROCEDURE — 63600175 PHARM REV CODE 636 W HCPCS: Performed by: STUDENT IN AN ORGANIZED HEALTH CARE EDUCATION/TRAINING PROGRAM

## 2022-03-04 PROCEDURE — D9220A PRA ANESTHESIA: ICD-10-PCS | Mod: ,,, | Performed by: ANESTHESIOLOGY

## 2022-03-04 PROCEDURE — 44005 PR FREEING BOWEL ADHESION,ENTEROLYSIS: ICD-10-PCS | Mod: GC,,, | Performed by: SURGERY

## 2022-03-04 PROCEDURE — 82962 GLUCOSE BLOOD TEST: CPT | Performed by: SURGERY

## 2022-03-04 PROCEDURE — 37000009 HC ANESTHESIA EA ADD 15 MINS: Performed by: SURGERY

## 2022-03-04 PROCEDURE — 63600175 PHARM REV CODE 636 W HCPCS

## 2022-03-04 PROCEDURE — 76942 ECHO GUIDE FOR BIOPSY: CPT | Performed by: STUDENT IN AN ORGANIZED HEALTH CARE EDUCATION/TRAINING PROGRAM

## 2022-03-04 PROCEDURE — 64999 ERECTOR SPINAE PLANE CONTINUOUS CATHETER: ICD-10-PCS | Mod: ,,, | Performed by: ANESTHESIOLOGY

## 2022-03-04 PROCEDURE — 27000221 HC OXYGEN, UP TO 24 HOURS

## 2022-03-04 PROCEDURE — 11000001 HC ACUTE MED/SURG PRIVATE ROOM

## 2022-03-04 PROCEDURE — 44005 FREEING OF BOWEL ADHESION: CPT | Mod: GC,,, | Performed by: SURGERY

## 2022-03-04 RX ORDER — ACETAMINOPHEN 10 MG/ML
1000 INJECTION, SOLUTION INTRAVENOUS ONCE
Status: COMPLETED | OUTPATIENT
Start: 2022-03-04 | End: 2022-03-04

## 2022-03-04 RX ORDER — POLYETHYLENE GLYCOL 3350 17 G/17G
17 POWDER, FOR SOLUTION ORAL DAILY
Refills: 1 | Status: DISCONTINUED | OUTPATIENT
Start: 2022-03-05 | End: 2022-03-05 | Stop reason: HOSPADM

## 2022-03-04 RX ORDER — LEVOTHYROXINE SODIUM 75 UG/1
75 TABLET ORAL
Status: DISCONTINUED | OUTPATIENT
Start: 2022-03-05 | End: 2022-03-05 | Stop reason: HOSPADM

## 2022-03-04 RX ORDER — SODIUM CHLORIDE 9 MG/ML
INJECTION, SOLUTION INTRAVENOUS CONTINUOUS
Status: DISCONTINUED | OUTPATIENT
Start: 2022-03-04 | End: 2022-03-04

## 2022-03-04 RX ORDER — GLUCAGON 1 MG
1 KIT INJECTION
Status: DISCONTINUED | OUTPATIENT
Start: 2022-03-04 | End: 2022-03-05 | Stop reason: HOSPADM

## 2022-03-04 RX ORDER — CEFAZOLIN SODIUM/WATER 2 G/20 ML
2 SYRINGE (ML) INTRAVENOUS
Status: COMPLETED | OUTPATIENT
Start: 2022-03-04 | End: 2022-03-04

## 2022-03-04 RX ORDER — MONTELUKAST SODIUM 10 MG/1
10 TABLET ORAL DAILY
Status: DISCONTINUED | OUTPATIENT
Start: 2022-03-05 | End: 2022-03-05 | Stop reason: HOSPADM

## 2022-03-04 RX ORDER — SUCCINYLCHOLINE CHLORIDE 20 MG/ML
INJECTION INTRAMUSCULAR; INTRAVENOUS
Status: DISCONTINUED | OUTPATIENT
Start: 2022-03-04 | End: 2022-03-04

## 2022-03-04 RX ORDER — ENOXAPARIN SODIUM 100 MG/ML
40 INJECTION SUBCUTANEOUS EVERY 24 HOURS
Status: DISCONTINUED | OUTPATIENT
Start: 2022-03-04 | End: 2022-03-05 | Stop reason: HOSPADM

## 2022-03-04 RX ORDER — ROPIVACAINE HYDROCHLORIDE 2 MG/ML
INJECTION, SOLUTION EPIDURAL; INFILTRATION; PERINEURAL CONTINUOUS
Status: DISCONTINUED | OUTPATIENT
Start: 2022-03-04 | End: 2022-03-05 | Stop reason: HOSPADM

## 2022-03-04 RX ORDER — ONDANSETRON 2 MG/ML
4 INJECTION INTRAMUSCULAR; INTRAVENOUS EVERY 12 HOURS PRN
Status: DISCONTINUED | OUTPATIENT
Start: 2022-03-04 | End: 2022-03-04

## 2022-03-04 RX ORDER — SODIUM CHLORIDE 0.9 % (FLUSH) 0.9 %
10 SYRINGE (ML) INJECTION
Status: DISCONTINUED | OUTPATIENT
Start: 2022-03-04 | End: 2022-03-04 | Stop reason: HOSPADM

## 2022-03-04 RX ORDER — ACETAMINOPHEN 500 MG
1000 TABLET ORAL ONCE
Status: COMPLETED | OUTPATIENT
Start: 2022-03-04 | End: 2022-03-04

## 2022-03-04 RX ORDER — MIDAZOLAM HYDROCHLORIDE 1 MG/ML
.5-4 INJECTION INTRAMUSCULAR; INTRAVENOUS
Status: DISCONTINUED | OUTPATIENT
Start: 2022-03-04 | End: 2022-03-04 | Stop reason: HOSPADM

## 2022-03-04 RX ORDER — ONDANSETRON 2 MG/ML
INJECTION INTRAMUSCULAR; INTRAVENOUS
Status: DISCONTINUED | OUTPATIENT
Start: 2022-03-04 | End: 2022-03-04

## 2022-03-04 RX ORDER — METHOCARBAMOL 500 MG/1
500 TABLET, FILM COATED ORAL 4 TIMES DAILY
Status: DISCONTINUED | OUTPATIENT
Start: 2022-03-04 | End: 2022-03-05 | Stop reason: HOSPADM

## 2022-03-04 RX ORDER — INSULIN ASPART 100 [IU]/ML
1-10 INJECTION, SOLUTION INTRAVENOUS; SUBCUTANEOUS EVERY 6 HOURS PRN
Status: DISCONTINUED | OUTPATIENT
Start: 2022-03-04 | End: 2022-03-05 | Stop reason: HOSPADM

## 2022-03-04 RX ORDER — MUPIROCIN 20 MG/G
1 OINTMENT TOPICAL 2 TIMES DAILY
Status: DISCONTINUED | OUTPATIENT
Start: 2022-03-04 | End: 2022-03-05 | Stop reason: HOSPADM

## 2022-03-04 RX ORDER — DULOXETIN HYDROCHLORIDE 30 MG/1
30 CAPSULE, DELAYED RELEASE ORAL 2 TIMES DAILY
Status: DISCONTINUED | OUTPATIENT
Start: 2022-03-04 | End: 2022-03-05 | Stop reason: HOSPADM

## 2022-03-04 RX ORDER — OXYCODONE HYDROCHLORIDE 10 MG/1
10 TABLET ORAL EVERY 4 HOURS PRN
Status: DISCONTINUED | OUTPATIENT
Start: 2022-03-04 | End: 2022-03-05 | Stop reason: HOSPADM

## 2022-03-04 RX ORDER — OXYCODONE HYDROCHLORIDE 5 MG/1
5 TABLET ORAL EVERY 4 HOURS PRN
Status: DISCONTINUED | OUTPATIENT
Start: 2022-03-04 | End: 2022-03-05 | Stop reason: HOSPADM

## 2022-03-04 RX ORDER — DEXAMETHASONE SODIUM PHOSPHATE 4 MG/ML
INJECTION, SOLUTION INTRA-ARTICULAR; INTRALESIONAL; INTRAMUSCULAR; INTRAVENOUS; SOFT TISSUE
Status: DISCONTINUED | OUTPATIENT
Start: 2022-03-04 | End: 2022-03-04

## 2022-03-04 RX ORDER — ATORVASTATIN CALCIUM 20 MG/1
40 TABLET, FILM COATED ORAL DAILY
Status: DISCONTINUED | OUTPATIENT
Start: 2022-03-05 | End: 2022-03-05 | Stop reason: HOSPADM

## 2022-03-04 RX ORDER — GABAPENTIN 300 MG/1
600 CAPSULE ORAL 2 TIMES DAILY
Refills: 3 | Status: DISCONTINUED | OUTPATIENT
Start: 2022-03-04 | End: 2022-03-05 | Stop reason: HOSPADM

## 2022-03-04 RX ORDER — AMOXICILLIN 250 MG
1 CAPSULE ORAL 2 TIMES DAILY PRN
Status: DISCONTINUED | OUTPATIENT
Start: 2022-03-05 | End: 2022-03-05 | Stop reason: HOSPADM

## 2022-03-04 RX ORDER — SODIUM CHLORIDE 9 MG/ML
INJECTION, SOLUTION INTRAVENOUS CONTINUOUS
Status: DISCONTINUED | OUTPATIENT
Start: 2022-03-04 | End: 2022-03-05

## 2022-03-04 RX ORDER — METRONIDAZOLE 500 MG/100ML
INJECTION, SOLUTION INTRAVENOUS
Status: DISCONTINUED | OUTPATIENT
Start: 2022-03-04 | End: 2022-03-04

## 2022-03-04 RX ORDER — ONDANSETRON 2 MG/ML
4 INJECTION INTRAMUSCULAR; INTRAVENOUS EVERY 12 HOURS PRN
Status: DISCONTINUED | OUTPATIENT
Start: 2022-03-04 | End: 2022-03-05 | Stop reason: HOSPADM

## 2022-03-04 RX ORDER — HALOPERIDOL 5 MG/ML
0.5 INJECTION INTRAMUSCULAR EVERY 10 MIN PRN
Status: DISCONTINUED | OUTPATIENT
Start: 2022-03-04 | End: 2022-03-04 | Stop reason: HOSPADM

## 2022-03-04 RX ORDER — ACETAMINOPHEN 500 MG
1000 TABLET ORAL EVERY 8 HOURS
Status: DISCONTINUED | OUTPATIENT
Start: 2022-03-04 | End: 2022-03-05 | Stop reason: HOSPADM

## 2022-03-04 RX ORDER — CELECOXIB 100 MG/1
100 CAPSULE ORAL 2 TIMES DAILY
Status: DISCONTINUED | OUTPATIENT
Start: 2022-03-04 | End: 2022-03-05 | Stop reason: HOSPADM

## 2022-03-04 RX ORDER — PANTOPRAZOLE SODIUM 20 MG/1
20 TABLET, DELAYED RELEASE ORAL DAILY
Status: DISCONTINUED | OUTPATIENT
Start: 2022-03-05 | End: 2022-03-05 | Stop reason: HOSPADM

## 2022-03-04 RX ORDER — ROCURONIUM BROMIDE 10 MG/ML
INJECTION, SOLUTION INTRAVENOUS
Status: DISCONTINUED | OUTPATIENT
Start: 2022-03-04 | End: 2022-03-04

## 2022-03-04 RX ORDER — PROPOFOL 10 MG/ML
VIAL (ML) INTRAVENOUS
Status: DISCONTINUED | OUTPATIENT
Start: 2022-03-04 | End: 2022-03-04

## 2022-03-04 RX ORDER — METHOCARBAMOL 500 MG/1
500 TABLET, FILM COATED ORAL EVERY 4 HOURS PRN
Status: DISCONTINUED | OUTPATIENT
Start: 2022-03-04 | End: 2022-03-04

## 2022-03-04 RX ORDER — BUPIVACAINE HYDROCHLORIDE 7.5 MG/ML
INJECTION, SOLUTION EPIDURAL; RETROBULBAR
Status: COMPLETED | OUTPATIENT
Start: 2022-03-04 | End: 2022-03-04

## 2022-03-04 RX ORDER — FENTANYL CITRATE 50 UG/ML
INJECTION, SOLUTION INTRAMUSCULAR; INTRAVENOUS
Status: COMPLETED
Start: 2022-03-04 | End: 2022-03-04

## 2022-03-04 RX ORDER — FENTANYL CITRATE 50 UG/ML
25-200 INJECTION, SOLUTION INTRAMUSCULAR; INTRAVENOUS
Status: DISCONTINUED | OUTPATIENT
Start: 2022-03-04 | End: 2022-03-04 | Stop reason: HOSPADM

## 2022-03-04 RX ADMIN — FENTANYL CITRATE 50 MCG: 50 INJECTION INTRAMUSCULAR; INTRAVENOUS at 08:03

## 2022-03-04 RX ADMIN — ACETAMINOPHEN 1000 MG: 500 TABLET ORAL at 07:03

## 2022-03-04 RX ADMIN — PROPOFOL 100 MG: 10 INJECTION, EMULSION INTRAVENOUS at 08:03

## 2022-03-04 RX ADMIN — SODIUM CHLORIDE: 0.9 INJECTION, SOLUTION INTRAVENOUS at 08:03

## 2022-03-04 RX ADMIN — METHOCARBAMOL 500 MG: 500 TABLET ORAL at 05:03

## 2022-03-04 RX ADMIN — BUPIVACAINE HYDROCHLORIDE 20 ML: 7.5 INJECTION, SOLUTION EPIDURAL; RETROBULBAR at 08:03

## 2022-03-04 RX ADMIN — ROCURONIUM BROMIDE 40 MG: 10 INJECTION, SOLUTION INTRAVENOUS at 08:03

## 2022-03-04 RX ADMIN — SUGAMMADEX 200 MG: 100 INJECTION, SOLUTION INTRAVENOUS at 09:03

## 2022-03-04 RX ADMIN — SUCCINYLCHOLINE CHLORIDE 120 MG: 20 INJECTION, SOLUTION INTRAMUSCULAR; INTRAVENOUS at 08:03

## 2022-03-04 RX ADMIN — DULOXETINE 30 MG: 30 CAPSULE, DELAYED RELEASE ORAL at 08:03

## 2022-03-04 RX ADMIN — PROPOFOL 50 MG: 10 INJECTION, EMULSION INTRAVENOUS at 09:03

## 2022-03-04 RX ADMIN — GABAPENTIN 600 MG: 300 CAPSULE ORAL at 08:03

## 2022-03-04 RX ADMIN — INSULIN ASPART 4 UNITS: 100 INJECTION, SOLUTION INTRAVENOUS; SUBCUTANEOUS at 05:03

## 2022-03-04 RX ADMIN — CELECOXIB 100 MG: 100 CAPSULE ORAL at 12:03

## 2022-03-04 RX ADMIN — ACETAMINOPHEN 1000 MG: 500 TABLET ORAL at 10:03

## 2022-03-04 RX ADMIN — SODIUM CHLORIDE: 0.9 INJECTION, SOLUTION INTRAVENOUS at 07:03

## 2022-03-04 RX ADMIN — ACETAMINOPHEN 1000 MG: 10 INJECTION INTRAVENOUS at 02:03

## 2022-03-04 RX ADMIN — ONDANSETRON HYDROCHLORIDE 4 MG: 2 INJECTION INTRAMUSCULAR; INTRAVENOUS at 09:03

## 2022-03-04 RX ADMIN — ENOXAPARIN SODIUM 40 MG: 100 INJECTION SUBCUTANEOUS at 05:03

## 2022-03-04 RX ADMIN — Medication 2 G: at 08:03

## 2022-03-04 RX ADMIN — GABAPENTIN 600 MG: 300 CAPSULE ORAL at 12:03

## 2022-03-04 RX ADMIN — METRONIDAZOLE 500 MG: 500 INJECTION, SOLUTION INTRAVENOUS at 08:03

## 2022-03-04 RX ADMIN — ROPIVACAINE HYDROCHLORIDE 200 ML: 2 INJECTION, SOLUTION EPIDURAL; INFILTRATION at 09:03

## 2022-03-04 RX ADMIN — CELECOXIB 100 MG: 100 CAPSULE ORAL at 08:03

## 2022-03-04 RX ADMIN — SODIUM CHLORIDE: 0.9 INJECTION, SOLUTION INTRAVENOUS at 10:03

## 2022-03-04 RX ADMIN — METHOCARBAMOL 500 MG: 500 TABLET ORAL at 08:03

## 2022-03-04 RX ADMIN — MUPIROCIN 1 G: 20 OINTMENT TOPICAL at 08:03

## 2022-03-04 RX ADMIN — SODIUM CHLORIDE: 0.9 INJECTION, SOLUTION INTRAVENOUS at 05:03

## 2022-03-04 RX ADMIN — PROPOFOL 50 MG: 10 INJECTION, EMULSION INTRAVENOUS at 08:03

## 2022-03-04 RX ADMIN — METHOCARBAMOL 500 MG: 500 TABLET ORAL at 12:03

## 2022-03-04 RX ADMIN — DEXAMETHASONE SODIUM PHOSPHATE 4 MG: 4 INJECTION INTRA-ARTICULAR; INTRALESIONAL; INTRAMUSCULAR; INTRAVENOUS; SOFT TISSUE at 08:03

## 2022-03-04 NOTE — ANESTHESIA PROCEDURE NOTES
Intubation    Date/Time: 3/4/2022 8:49 AM  Performed by: Fareed Haynes MD  Authorized by: Devendra Forman MD     Intubation:     Induction:  Intravenous    Intubated:  Postinduction    Mask Ventilation:  N/a    Attempts:  1    Attempted By:  Resident anesthesiologist    Method of Intubation:  Video laryngoscopy    Blade:  Blackmon 3    Laryngeal View Grade: Grade I - full view of cords      Difficult Airway Encountered?: No      Complications:  None    Airway Device:  Oral endotracheal tube    Airway Device Size:  7.0    Style/Cuff Inflation:  Cuffed (inflated to minimal occlusive pressure)    Tube secured:  22    Secured at:  The lips    Placement Verified By:  Capnometry    Complicating Factors:  None    Findings Post-Intubation:  BS equal bilateral and atraumatic/condition of teeth unchanged

## 2022-03-04 NOTE — BRIEF OP NOTE
Operative Note       Surgery Date: 3/4/2022     Surgeon(s) and Role:     * Leonidas Barriga MD - Primary     * Kirsten Montgomery MD - Resident - Assisting    Pre-op Diagnosis:  Adhesion of abdominal wall [K66.0]    Post-op Diagnosis:  Adhesion of abdominal wall [K66.0]    Procedure(s) (LRB):  LAPAROTOMY, EXPLORATORY Lysis of adhesion, (N/A)    Anesthesia: General    Procedure in Detail/Findings:  Extremely dense adhesions and excessively hemorrhagic tissues.  After opening the upper midline incision through the fascia and attempting to dissect laterally I decided that the risk of further dissection was too high and the operation concluded.    Estimated Blood Loss: Minimal           Specimens (From admission, onward)    None        Implants: * No implants in log *           Disposition: PACU - hemodynamically stable.           Condition: Good    Attestation:  I was present and scrubbed for the entire procedure.

## 2022-03-04 NOTE — ANESTHESIA PROCEDURE NOTES
Erector Spinae Plane Continuous Catheter    Patient location during procedure: pre-op   Block not for primary anesthetic.  Reason for block: at surgeon's request and post-op pain management   Post-op Pain Location: bilateral abdominal pain   Start time: 3/4/2022 8:15 AM  Timeout: 3/4/2022 8:14 AM   End time: 3/4/2022 8:35 AM    Staffing  Authorizing Provider: Duglas Bennett MD  Performing Provider: Latonya Rubio MD    Preanesthetic Checklist  Completed: patient identified, IV checked, site marked, risks and benefits discussed, surgical consent, monitors and equipment checked, pre-op evaluation and timeout performed  Peripheral Block  Patient position: sitting  Prep: ChloraPrep  Patient monitoring: heart rate, cardiac monitor, continuous pulse ox, continuous capnometry and frequent blood pressure checks  Block type: erector spinae plane (Erector Spinae Plane)  Laterality: bilateral  Injection technique: continuous  Interspace: T7-8    Needle  Needle type: Tuohy   Needle gauge: 17 G  Needle length: 3.5 in  Needle localization: anatomical landmarks and ultrasound guidance  Catheter type: spring wound  Catheter size: 19 G  Test dose: lidocaine 1.5% with Epi 1-to-200,000 and negative   -ultrasound image captured on disc.  Assessment  Injection assessment: negative aspiration, negative parasthesia and local visualized surrounding nerve  Paresthesia pain: none  Heart rate change: no  Slow fractionated injection: yes    Medications:    Medications: bupivacaine (pf) (MARCAINE) injection 0.75% - Perineural   20 mL - 3/4/2022 8:20:00 AM    Additional Notes  A time out was conducted. Site love confirmed with team and patient. Allergies reviewed.   Vital signs stable throughout block. RN monitoring vitals throughout.   Needle advanced under continuous ultrasound guidance.  Local injected incrementally after confirming negative aspiration. No signs or symptoms of intravascular or intraneural injection noted.   No  persistent paresthesias elicited or expressed. Patient tolerated procedure well.  20 cc of 0.375% bupivacaine with epinephrine 1:300K, PF dexamethasone 1 mg, and clonidine 50 mcg used for the block on each side.

## 2022-03-04 NOTE — ANESTHESIA POSTPROCEDURE EVALUATION
Anesthesia Post Evaluation    Patient: June Hardik Judd    Procedure(s) Performed: Procedure(s) (LRB):  LAPAROTOMY, EXPLORATORY Lysis of adhesion, (N/A)    Final Anesthesia Type: general      Patient location during evaluation: PACU  Patient participation: Yes- Able to Participate  Level of consciousness: awake and alert  Post-procedure vital signs: reviewed and stable  Pain management: adequate  Airway patency: patent    PONV status at discharge: No PONV  Anesthetic complications: no      Cardiovascular status: blood pressure returned to baseline  Respiratory status: unassisted  Hydration status: euvolemic  Follow-up not needed.          Vitals Value Taken Time   /52 03/04/22 1302   Temp 36.4 °C (97.6 °F) 03/04/22 1302   Pulse 72 03/04/22 1302   Resp 16 03/04/22 1302   SpO2 97 % 03/04/22 1302         Event Time   Out of Recovery 10:20:00         Pain/Roscoe Score: Pain Rating Prior to Med Admin: 5 (3/4/2022 12:37 PM)  Roscoe Score: 9 (3/4/2022 10:00 AM)

## 2022-03-04 NOTE — ADDENDUM NOTE
Addendum  created 03/04/22 1501 by Duglas Bennett MD    Attestation recorded in Intraprocedure, Intraprocedure Attestations filed

## 2022-03-04 NOTE — TRANSFER OF CARE
Anesthesia Transfer of Care Note    Patient: Anayeli Hardik Judd    Procedure(s) Performed: Procedure(s) (LRB):  LAPAROTOMY, EXPLORATORY Lysis of adhesion, (N/A)    Patient location: PACU    Anesthesia Type: general    Transport from OR: Transported from OR on 6-10 L/min O2 by face mask with adequate spontaneous ventilation    Post pain: adequate analgesia    Post assessment: no apparent anesthetic complications and tolerated procedure well    Post vital signs: stable    Level of consciousness: awake    Nausea/Vomiting: no nausea/vomiting    Complications: none    Transfer of care protocol was followed      Last vitals:   Visit Vitals  BP (!) 148/66 (BP Location: Left arm, Patient Position: Sitting)   Pulse 96   Temp 36.5 °C (97.7 °F) (Temporal)   Resp 14   Wt 59.4 kg (131 lb)   LMP  (LMP Unknown)   SpO2 100%   Breastfeeding No   BMI 23.96 kg/m²

## 2022-03-04 NOTE — PLAN OF CARE
Vs stable, Dressing clean intact with scant amt drainage.  States pain 5/10.  Daughters at bedside to visit.  Prepared for transfer to floor.

## 2022-03-04 NOTE — NURSING TRANSFER
Nursing Transfer Note      3/4/2022     Reason patient is being transferred: pt released from anesth by aldretti    Transfer To: 527    Transfer via bed    Transfer with     Transported by pct    Medicines sent: iv pncx2    Any special needs or follow-up needed:     Chart send with patient: Yes    Notified: daughter    Patient reassessed at:  1145 3/4/2022    Upon arrival to floor: patient oriented to room, call bell in reach and bed in lowest position

## 2022-03-04 NOTE — OP NOTE
DATE OF PROCEDURE: 3/4/2022    PRE OP DIAGNOSIS: Adhesion of abdominal wall [K66.0]    POST OP DIAGNOSIS: Adhesion of abdominal wall [K66.0]    PROCEDURE: Procedure(s) (LRB):  LAPAROTOMY, EXPLORATORY Lysis of adhesion, (N/A)    Surgeon(s) and Role:     * Leonidas Barriga MD - Primary     * Kirsten Montgomery MD - Resident - Assisting    ANESTHESIA: General.     Procedure:    The patient was placed under general anesthesia and the abdomen prepped and draped in usual manner.  An upper midline incision was made with scalpel subcutaneous tissues were divided with the Bovie.  Fascia was divided with the Bovie.  Peritoneum was entered carefully with Metzenbaum site and we were able to open up the fascia for a length of approximately 6 cm and get underneath the fascia using sharp dissection.  There was quite a bit of bleeding from small blood vessels everywhere and in addition the entire abdomen seemed to be locked in.  In fact after 30 minutes of meticulous dissection we got almost no wear.  I felt that it was too risky to continue surgery as despite the fact that she has had multiple bowel obstructions then returns to the emergency room frequently if we were to attempt to do further surgery to try and delineate the bowel more the chance of causing a fistula would be extremely high.  The fascia was reapproximated with 1. PDS and the skin was reapproximated with 4-0 Monocryl.  Dermabond was placed on the incision.  Patient tolerated procedure well was brought to a room stable condition.  Sponge and needle counts were correct at the end the case.  Blood loss is minimal, complications none and pathology none.    This dictation was done with voice recognition software.

## 2022-03-04 NOTE — BRIEF OP NOTE
[facility] (2nd Fl)  Brief Operative Note    SUMMARY     Surgery Date: 3/4/2022     Surgeon(s) and Role:     * Leonidas Barriga MD - Primary     * Kirsten Montgomery MD - Resident - Assisting    Pre-op Diagnosis:  Adhesion of abdominal wall [K66.0]    Post-op Diagnosis:  Post-Op Diagnosis Codes:     * Adhesion of abdominal wall [K66.0]    Procedure(s) (LRB):  LAPAROTOMY, EXPLORATORY Lysis of adhesion, (N/A)    Anesthesia: General    Operative Findings: Exploratory laparotomy - bowel densely adherent to the abdominal wall and essentially frozen as far as we could tell. Remainder of procedure aborted.     Estimated Blood Loss: * No values recorded between 3/4/2022  8:57 AM and 3/4/2022  9:42 AM *    Estimated Blood Loss has not been documented. EBL = 40cc.         Specimens:   Specimen (24h ago, onward)            None          [id number]

## 2022-03-05 VITALS
OXYGEN SATURATION: 95 % | WEIGHT: 131 LBS | RESPIRATION RATE: 18 BRPM | SYSTOLIC BLOOD PRESSURE: 125 MMHG | BODY MASS INDEX: 23.96 KG/M2 | DIASTOLIC BLOOD PRESSURE: 57 MMHG | HEART RATE: 67 BPM | TEMPERATURE: 98 F

## 2022-03-05 LAB
ANION GAP SERPL CALC-SCNC: 9 MMOL/L (ref 8–16)
BUN SERPL-MCNC: 13 MG/DL (ref 8–23)
CALCIUM SERPL-MCNC: 8.7 MG/DL (ref 8.7–10.5)
CHLORIDE SERPL-SCNC: 110 MMOL/L (ref 95–110)
CO2 SERPL-SCNC: 22 MMOL/L (ref 23–29)
CREAT SERPL-MCNC: 0.8 MG/DL (ref 0.5–1.4)
ERYTHROCYTE [DISTWIDTH] IN BLOOD BY AUTOMATED COUNT: 13 % (ref 11.5–14.5)
EST. GFR  (AFRICAN AMERICAN): >60 ML/MIN/1.73 M^2
EST. GFR  (NON AFRICAN AMERICAN): >60 ML/MIN/1.73 M^2
GLUCOSE SERPL-MCNC: 178 MG/DL (ref 70–110)
HCT VFR BLD AUTO: 32.3 % (ref 37–48.5)
HGB BLD-MCNC: 10.4 G/DL (ref 12–16)
MCH RBC QN AUTO: 32.6 PG (ref 27–31)
MCHC RBC AUTO-ENTMCNC: 32.2 G/DL (ref 32–36)
MCV RBC AUTO: 101 FL (ref 82–98)
PLATELET # BLD AUTO: 66 K/UL (ref 150–450)
PMV BLD AUTO: 11.2 FL (ref 9.2–12.9)
POCT GLUCOSE: 138 MG/DL (ref 70–110)
POCT GLUCOSE: 170 MG/DL (ref 70–110)
POCT GLUCOSE: 231 MG/DL (ref 70–110)
POTASSIUM SERPL-SCNC: 4.3 MMOL/L (ref 3.5–5.1)
RBC # BLD AUTO: 3.19 M/UL (ref 4–5.4)
SODIUM SERPL-SCNC: 141 MMOL/L (ref 136–145)
WBC # BLD AUTO: 4.96 K/UL (ref 3.9–12.7)

## 2022-03-05 PROCEDURE — 25000003 PHARM REV CODE 250

## 2022-03-05 PROCEDURE — 36415 COLL VENOUS BLD VENIPUNCTURE: CPT | Performed by: STUDENT IN AN ORGANIZED HEALTH CARE EDUCATION/TRAINING PROGRAM

## 2022-03-05 PROCEDURE — 25000003 PHARM REV CODE 250: Performed by: STUDENT IN AN ORGANIZED HEALTH CARE EDUCATION/TRAINING PROGRAM

## 2022-03-05 PROCEDURE — 85027 COMPLETE CBC AUTOMATED: CPT | Performed by: STUDENT IN AN ORGANIZED HEALTH CARE EDUCATION/TRAINING PROGRAM

## 2022-03-05 PROCEDURE — 99231 SBSQ HOSP IP/OBS SF/LOW 25: CPT | Mod: GC,,, | Performed by: ANESTHESIOLOGY

## 2022-03-05 PROCEDURE — 99231 PR SUBSEQUENT HOSPITAL CARE,LEVL I: ICD-10-PCS | Mod: GC,,, | Performed by: ANESTHESIOLOGY

## 2022-03-05 PROCEDURE — 80048 BASIC METABOLIC PNL TOTAL CA: CPT | Performed by: STUDENT IN AN ORGANIZED HEALTH CARE EDUCATION/TRAINING PROGRAM

## 2022-03-05 RX ORDER — OXYCODONE HYDROCHLORIDE 5 MG/1
5 TABLET ORAL EVERY 6 HOURS PRN
Qty: 20 TABLET | Refills: 0 | Status: SHIPPED | OUTPATIENT
Start: 2022-03-05 | End: 2022-04-13

## 2022-03-05 RX ADMIN — METHOCARBAMOL 500 MG: 500 TABLET ORAL at 09:03

## 2022-03-05 RX ADMIN — INSULIN ASPART 2 UNITS: 100 INJECTION, SOLUTION INTRAVENOUS; SUBCUTANEOUS at 12:03

## 2022-03-05 RX ADMIN — CELECOXIB 100 MG: 100 CAPSULE ORAL at 09:03

## 2022-03-05 RX ADMIN — GABAPENTIN 600 MG: 300 CAPSULE ORAL at 09:03

## 2022-03-05 RX ADMIN — ATORVASTATIN CALCIUM 40 MG: 20 TABLET, FILM COATED ORAL at 09:03

## 2022-03-05 RX ADMIN — ACETAMINOPHEN 1000 MG: 500 TABLET ORAL at 01:03

## 2022-03-05 RX ADMIN — MONTELUKAST 10 MG: 10 TABLET, FILM COATED ORAL at 09:03

## 2022-03-05 RX ADMIN — DULOXETINE 30 MG: 30 CAPSULE, DELAYED RELEASE ORAL at 09:03

## 2022-03-05 RX ADMIN — METHOCARBAMOL 500 MG: 500 TABLET ORAL at 01:03

## 2022-03-05 RX ADMIN — POLYETHYLENE GLYCOL 3350 17 G: 17 POWDER, FOR SOLUTION ORAL at 09:03

## 2022-03-05 RX ADMIN — MUPIROCIN 1 G: 20 OINTMENT TOPICAL at 09:03

## 2022-03-05 RX ADMIN — LEVOTHYROXINE SODIUM 75 MCG: 75 TABLET ORAL at 05:03

## 2022-03-05 RX ADMIN — PANTOPRAZOLE SODIUM 20 MG: 20 TABLET, DELAYED RELEASE ORAL at 09:03

## 2022-03-05 RX ADMIN — ACETAMINOPHEN 1000 MG: 500 TABLET ORAL at 05:03

## 2022-03-05 NOTE — PROGRESS NOTES
[facility]  General Surgery  Progress Note    Subjective:     History of Present Illness:  No notes on file    Post-Op Info:  Procedure(s) (LRB):  LAPAROTOMY, EXPLORATORY Lysis of adhesion, (N/A)   1 Day Post-Op     Interval History: No acute events overnight, afebrile, vitals stable. Pain well controlled overnight. Tolerated clears and a pudding without any nausea or emesis. Has been up and out of bed.     Medications:  Continuous Infusions:   sodium chloride 0.9% 125 mL/hr at 03/04/22 1741    ROPIvacaine (PF) 2 mg/ml (0.2%)      ROPIvacaine (PF) 2 mg/ml (0.2%)       Scheduled Meds:   acetaminophen  1,000 mg Oral Q8H    atorvastatin  40 mg Oral Daily    celecoxib  100 mg Oral BID    DULoxetine  30 mg Oral BID    enoxaparin  40 mg Subcutaneous Daily    gabapentin  600 mg Oral BID    levothyroxine  75 mcg Oral Before breakfast    methocarbamoL  500 mg Oral QID    montelukast  10 mg Oral Daily    mupirocin  1 g Nasal BID    pantoprazole  20 mg Oral Daily    polyethylene glycol  17 g Oral Daily     PRN Meds:dextrose 10%, glucagon (human recombinant), insulin aspart U-100, ondansetron, oxyCODONE, oxyCODONE, psyllium husk (aspartame), senna-docusate 8.6-50 mg     Review of patient's allergies indicates:   Allergen Reactions    Codeine Itching and Nausea And Vomiting    Dilaudid [hydromorphone (bulk)] Other (See Comments)     Oversedating, head burning. Pt prefers to avoid.       Percocet [oxycodone-acetaminophen] Itching    Sulfa (sulfonamide antibiotics) Itching and Nausea And Vomiting           Latex, natural rubber Rash     Objective:     Vital Signs (Most Recent):  Temp: 97.6 °F (36.4 °C) (03/05/22 0737)  Pulse: 68 (03/05/22 0737)  Resp: 18 (03/05/22 0737)  BP: (!) 98/54 (03/05/22 0737)  SpO2: (!) 94 % (03/05/22 0737)   Vital Signs (24h Range):  Temp:  [97.5 °F (36.4 °C)-98.2 °F (36.8 °C)] 97.6 °F (36.4 °C)  Pulse:  [68-96] 68  Resp:  [14-24] 18  SpO2:  [94 %-100 %] 94 %  BP:  ()/(48-66) 98/54     Weight: 59.4 kg (131 lb)  Body mass index is 23.96 kg/m².    Intake/Output - Last 3 Shifts         03/03 0700  03/04 0659 03/04 0700  03/05 0659 03/05 0700  03/06 0659    IV Piggyback  1300     Total Intake(mL/kg)  1300 (21.9)     Net  +1300            Urine Occurrence  1 x             Physical Exam  Constitutional:       Appearance: She is well-developed.   HENT:      Head: Normocephalic and atraumatic.      Nose: Nose normal.   Eyes:      General: No scleral icterus.     Conjunctiva/sclera: Conjunctivae normal.      Pupils: Pupils are equal, round, and reactive to light.   Neck:      Thyroid: No thyromegaly.   Cardiovascular:      Rate and Rhythm: Normal rate and regular rhythm.   Pulmonary:      Effort: Pulmonary effort is normal. No respiratory distress.   Abdominal:      General: There is no distension.      Palpations: Abdomen is soft.      Tenderness: There is no abdominal tenderness.      Comments: Midline incision clean, dry and intact  Abdomen soft and appropriately tender to palpation   Musculoskeletal:         General: Normal range of motion.      Cervical back: Normal range of motion.   Lymphadenopathy:      Cervical: No cervical adenopathy.   Skin:     General: Skin is warm and dry.      Findings: No rash.   Neurological:      Mental Status: She is alert and oriented to person, place, and time.       Significant Labs:  I have reviewed all pertinent lab results within the past 24 hours.  CBC:   Recent Labs   Lab 03/05/22  0446   WBC 4.96   RBC 3.19*   HGB 10.4*   HCT 32.3*   PLT 66*   *   MCH 32.6*   MCHC 32.2     BMP:   Recent Labs   Lab 03/05/22  0446   *      K 4.3      CO2 22*   BUN 13   CREATININE 0.8   CALCIUM 8.7       Significant Diagnostics:  I have reviewed all pertinent imaging results/findings within the past 24 hours.    Assessment/Plan:     * Adhesion of abdominal wall  78yo female with hx of transverse colectomy(open) with persistent  crampy abdominal pain episodes of SBO requiring ER visits now s/p attempted ex-lap on 3/4 which was aborted early given extent of intra-abdominal adhesions on 3/4    - Regular diet today  - d/c mIVFs  - discussed removal of KURTIS catheters today with anesthesia pain  - home meds  - encourage OOBTC, IS and ambulation  - DVT ppx    Dispo: Home later today vs tomorrow when angel diet and pain well controlled without KURTIS catheters        Kirsten Montgomery MD  General Surgery  [facility]

## 2022-03-05 NOTE — DISCHARGE SUMMARY
[facility]  General Surgery  Discharge Summary      Patient Name: Anayeli Judd  MRN: [id number]  Admission Date: 3/4/2022  Hospital Length of Stay: 1 days  Discharge Date and Time:  03/05/2022 12:52 PM  Attending Physician: Leonidas Barriga MD   Discharging Provider: Kirsten Montgomery MD  Primary Care Provider: Darci Guthrie MD     HPI: Anayeli Judd is a 77 y.o. year old female hx of DM, colon cancer(s/p transverse colectomy), UE DVT(no blood thinners), who presents to the general surgery clinic on 02/08/2022  for recurrent episodes of abdominal pain requiring multiple ER visit with SBO.. Pt has been having these episodes for a while no, however, she feels a though they have become more frequently since thanksgiving. They have be to the ER 5 times since then. Pt notes crampy abdominal pain. She notes she had a an episode of vomiting with 1 episode but usually does not experience nausea or vomiting. When she goes to the ED her symptoms resolve after she is given contrast for imaging study. She denies other complaints.      Surg Hx: Open transverse colectomy, open cholecystectomy   Social: Non-smoker      Procedure(s) (LRB):  LAPAROTOMY, EXPLORATORY Lysis of adhesion, (N/A)     Hospital Course: Patient presented as above and underwent the stated procedure which was performed without apparent intra-operative complication. Following the case she was taken to PACU and then the floor where labs and vitals remained stable and appropriate. Diet was advanced and well tolerated. KURTIS catheters were removed and pain was controlled with oral meds. Given this she was deemed stable for discharge home with follow up.     Consults: None    Significant Diagnostic Studies: Labs:   BMP:   Recent Labs   Lab 03/05/22 0446   *      K 4.3      CO2 22*   BUN 13   CREATININE 0.8   CALCIUM 8.7    and CBC   Recent Labs   Lab 03/05/22 0446   WBC 4.96   HGB 10.4*   HCT 32.3*   PLT 66*       Pending  Diagnostic Studies:     None        Final Active Diagnoses:    Diagnosis Date Noted POA    PRINCIPAL PROBLEM:  Adhesion of abdominal wall [K66.0] 03/04/2022 Yes      Problems Resolved During this Admission:      Discharged Condition: good    Disposition: Home or Self Care    Follow Up:   Follow-up Information     Leonidas Barriga MD Follow up in 2 week(s).    Specialties: General Surgery, Bariatrics  Contact information:  [address]  [facility] [address]  [phone]                       Patient Instructions:      Ambulatory referral/consult to Outpatient Case Management   Referral Priority: Routine Referral Type: Consultation   Referral Reason: Specialty Services Required   Number of Visits Requested: 1     Lifting restrictions   Order Comments: May shower in 24 hours. No submerging the incision in the water. May wash softly with water and soap.  Call with any redness, hotness, or drainage from the incision. Pain that is not well controlled with the prescribed medications or any fevers greater than 101.  No heavy lifting over 10 lbs for 6 weeks.    Please keep your follow up appointment  You may resume all mediations that you were taking at home.     Notify your health care provider if you experience any of the following:  increased confusion or weakness     Notify your health care provider if you experience any of the following:  persistent dizziness, light-headedness, or visual disturbances     Notify your health care provider if you experience any of the following:  worsening rash     Notify your health care provider if you experience any of the following:  severe persistent headache     Notify your health care provider if you experience any of the following:  difficulty breathing or increased cough     Notify your health care provider if you experience any of the following:  redness, tenderness, or signs of infection (pain, swelling, redness, odor or green/yellow discharge around incision site)      Notify your health care provider if you experience any of the following:  severe uncontrolled pain     Notify your health care provider if you experience any of the following:  persistent nausea and vomiting or diarrhea     Notify your health care provider if you experience any of the following:  temperature >100.4     Remove dressing in 24 hours     Medications:  Reconciled Home Medications:      Medication List      START taking these medications    oxyCODONE 5 MG immediate release tablet  Commonly known as: ROXICODONE  Take 1 tablet (5 mg total) by mouth every 6 (six) hours as needed for Pain.        CHANGE how you take these medications    gabapentin 600 MG tablet  Commonly known as: NEURONTIN  Take 1 tablet (600 mg total) by mouth 2 (two) times daily.  What changed: when to take this        CONTINUE taking these medications    acetaminophen 650 MG Tbsr  Commonly known as: TYLENOL  Take 650 mg by mouth once as needed (pain).     ascorbic acid (vitamin C) 1000 MG tablet  Commonly known as: VITAMIN C  Take 1,000 mg by mouth once daily.     atorvastatin 40 MG tablet  Commonly known as: LIPITOR  TAKE 1 TABLET BY MOUTH  DAILY     azelastine 137 mcg (0.1 %) nasal spray  Commonly known as: ASTELIN  2 sprays (274 mcg total) by Nasal route 2 (two) times daily as needed for Rhinitis.     biotin 10,000 mcg Tbdl  Take 1 tablet by mouth once daily.     co-enzyme Q-10 30 mg capsule  Take 30 mg by mouth 3 (three) times daily.     cranberry extract 200 mg Cap  Take 1 capsule by mouth once daily.     D-MANNOSE ORAL  Take 1 tablet by mouth once daily.     DULoxetine 30 MG capsule  Commonly known as: CYMBALTA  Take 1 capsule (30 mg total) by mouth 2 (two) times daily.     fluticasone propionate 50 mcg/actuation nasal spray  Commonly known as: FLONASE  1 spray by Each Nostril route daily as needed for Rhinitis.     FREESTYLE EFREN 10 DAY READER Misc  Generic drug: flash glucose scanning reader  TEST as directed     FREESTYLE  EFREN 14 DAY SENSOR Kit  Generic drug: flash glucose sensor  1 application by Misc.(Non-Drug; Combo Route) route every 14 (fourteen) days. Disp 30 or 90 day refill     FREESTYLE LITE STRIPS Strp  Generic drug: blood sugar diagnostic  TEST DAILY AS DIRECTED     hydrocortisone 2.5 % cream  Apply topically 2 (two) times daily as needed.     hyoscyamine 0.125 mg Tab  Commonly known as: ANASPAZ,LEVSIN  Take 1 tablet (125 mcg total) by mouth every 8 (eight) hours as needed (urgency).     levothyroxine 75 MCG tablet  Commonly known as: SYNTHROID  Take 1 tablet (75 mcg total) by mouth before breakfast.     LIDOcaine 5 %  Commonly known as: LIDODERM  Place 1 patch onto the skin once daily. Remove & Discard patch within 12 hours or as directed by MD     magnesium 250 mg Tab  Take 1,000 mg by mouth once daily.     metFORMIN 500 MG tablet  Commonly known as: GLUCOPHAGE  Take 500 mg by mouth 2 (two) times daily with meals.     montelukast 10 mg tablet  Commonly known as: SINGULAIR  TAKE 1 TABLET BY MOUTH  DAILY     ondansetron 8 MG Tbdl  Commonly known as: ZOFRAN-ODT  Take 1 tablet (8 mg total) by mouth every 8 (eight) hours as needed (nausea).     pantoprazole 20 MG tablet  Commonly known as: PROTONIX  Take 1 tablet (20 mg total) by mouth once daily.     polyethylene glycol 17 gram/dose powder  Commonly known as: GLYCOLAX  Take 17 g by mouth before dinner.     PREMARIN vaginal cream  Generic drug: conjugated estrogens  INSERT 1/2 GRAM VAGINALLY  TWICE WEEKLY     psyllium husk (aspartame) 3.4 gram Pwpk packet  Commonly known as: METAMUCIL  Take 1 packet by mouth 2 (two) times daily as needed (constipation).     senna-docusate 8.6-50 mg 8.6-50 mg per tablet  Commonly known as: PERICOLACE  Take 1 tablet by mouth 2 (two) times daily as needed for Constipation.     vitamin D 1000 units Tab  Commonly known as: VITAMIN D3  Take 1,000 Units by mouth once daily. D3            Kirsten Montgomery MD  General Surgery  [facility]

## 2022-03-05 NOTE — ASSESSMENT & PLAN NOTE
76yo female with hx of transverse colectomy(open) with persistent crampy abdominal pain episodes of SBO requiring ER visits now s/p attempted ex-lap on 3/4 which was aborted early given extent of intra-abdominal adhesions on 3/4    - Regular diet today  - d/c mIVFs  - discussed removal of KURTIS catheters today with anesthesia pain  - home meds  - encourage OOBTC, IS and ambulation  - DVT ppx    Dispo: Home later today vs tomorrow when angel diet and pain well controlled without KURTIS catheters

## 2022-03-05 NOTE — SUBJECTIVE & OBJECTIVE
Interval History: No acute events overnight, afebrile, vitals stable. Pain well controlled overnight. Tolerated clears and a pudding without any nausea or emesis. Has been up and out of bed.     Medications:  Continuous Infusions:   sodium chloride 0.9% 125 mL/hr at 03/04/22 1741    ROPIvacaine (PF) 2 mg/ml (0.2%)      ROPIvacaine (PF) 2 mg/ml (0.2%)       Scheduled Meds:   acetaminophen  1,000 mg Oral Q8H    atorvastatin  40 mg Oral Daily    celecoxib  100 mg Oral BID    DULoxetine  30 mg Oral BID    enoxaparin  40 mg Subcutaneous Daily    gabapentin  600 mg Oral BID    levothyroxine  75 mcg Oral Before breakfast    methocarbamoL  500 mg Oral QID    montelukast  10 mg Oral Daily    mupirocin  1 g Nasal BID    pantoprazole  20 mg Oral Daily    polyethylene glycol  17 g Oral Daily     PRN Meds:dextrose 10%, glucagon (human recombinant), insulin aspart U-100, ondansetron, oxyCODONE, oxyCODONE, psyllium husk (aspartame), senna-docusate 8.6-50 mg     Review of patient's allergies indicates:   Allergen Reactions    Codeine Itching and Nausea And Vomiting    Dilaudid [hydromorphone (bulk)] Other (See Comments)     Oversedating, head burning. Pt prefers to avoid.       Percocet [oxycodone-acetaminophen] Itching    Sulfa (sulfonamide antibiotics) Itching and Nausea And Vomiting           Latex, natural rubber Rash     Objective:     Vital Signs (Most Recent):  Temp: 97.6 °F (36.4 °C) (03/05/22 0737)  Pulse: 68 (03/05/22 0737)  Resp: 18 (03/05/22 0737)  BP: (!) 98/54 (03/05/22 0737)  SpO2: (!) 94 % (03/05/22 0737)   Vital Signs (24h Range):  Temp:  [97.5 °F (36.4 °C)-98.2 °F (36.8 °C)] 97.6 °F (36.4 °C)  Pulse:  [68-96] 68  Resp:  [14-24] 18  SpO2:  [94 %-100 %] 94 %  BP: ()/(48-66) 98/54     Weight: 59.4 kg (131 lb)  Body mass index is 23.96 kg/m².    Intake/Output - Last 3 Shifts         03/03 0700  03/04 0659 03/04 0700 03/05 0659 03/05 0700  03/06 0659    IV Piggyback  1300     Total Intake(mL/kg)  1300 (21.9)      Net  +1300            Urine Occurrence  1 x             Physical Exam  Constitutional:       Appearance: She is well-developed.   HENT:      Head: Normocephalic and atraumatic.      Nose: Nose normal.   Eyes:      General: No scleral icterus.     Conjunctiva/sclera: Conjunctivae normal.      Pupils: Pupils are equal, round, and reactive to light.   Neck:      Thyroid: No thyromegaly.   Cardiovascular:      Rate and Rhythm: Normal rate and regular rhythm.   Pulmonary:      Effort: Pulmonary effort is normal. No respiratory distress.   Abdominal:      General: There is no distension.      Palpations: Abdomen is soft.      Tenderness: There is no abdominal tenderness.      Comments: Midline incision clean, dry and intact  Abdomen soft and appropriately tender to palpation   Musculoskeletal:         General: Normal range of motion.      Cervical back: Normal range of motion.   Lymphadenopathy:      Cervical: No cervical adenopathy.   Skin:     General: Skin is warm and dry.      Findings: No rash.   Neurological:      Mental Status: She is alert and oriented to person, place, and time.       Significant Labs:  I have reviewed all pertinent lab results within the past 24 hours.  CBC:   Recent Labs   Lab 03/05/22  0446   WBC 4.96   RBC 3.19*   HGB 10.4*   HCT 32.3*   PLT 66*   *   MCH 32.6*   MCHC 32.2     BMP:   Recent Labs   Lab 03/05/22  0446   *      K 4.3      CO2 22*   BUN 13   CREATININE 0.8   CALCIUM 8.7       Significant Diagnostics:  I have reviewed all pertinent imaging results/findings within the past 24 hours.

## 2022-03-05 NOTE — NURSING
Handoff report received, patient in bed awake.    Skin assessment done, noted lungs are clear to auscultation, bowel sounds present, pedal pulses present, 2 foam bordered dressing to abdomen- one with some drainage. 2 PNC to mid back, denies any pain at this time.   patient stated she feels the urge to void but nothing is in the canister. Stated she needs to stand up and then it starts to flow out. Assisted patient to stand up. Has a sanitary napkin on which is saturated with urine. Sat on commode and voided large amount of urine. Assisted back to bed with new [employer] on.    0720 Patient had an uneventful night, slept well, no complaint voiced.  Handoff report given to nurse Mckeon.

## 2022-03-05 NOTE — NURSING
Pt received discharge instructions. Verbalized understanding of all instructions. Prescription picked up by daughter. PIV removed, no redness/swelling. Transporting out via wheelchair.

## 2022-03-05 NOTE — ANESTHESIA POST-OP PAIN MANAGEMENT
Acute Pain Service Progress Note    Anayeli Judd is a 77 y.o., female, [id number].    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       Subjective:     General appearance of alert, oriented, no complaints   Pain with rest: 1    Numbers   Pain with movement: 3    Numbers   Side Effects    1. Pruritis No    2. Nausea No    3. Motor Blockade No, 0=Ability to raise lower extremities off bed    4. Sedation No, 1=awake and alert    Objective:     Catheter level T7-8   Catheter site clean, dry, intact        Vitals   Vitals:    03/05/22 1147   BP: (!) 125/57   Pulse: 67   Resp:    Temp: 36.8 °C (98.2 °F)        Labs    Admission on 03/04/2022   Component Date Value Ref Range Status    SARS Coronavirus 2 Antigen 03/04/2022 Negative  Negative Final     Acceptable 03/04/2022 Yes   Final    POCT Glucose 03/04/2022 166 (A) 70 - 110 mg/dL Final    POCT Glucose 03/04/2022 223 (A) 70 - 110 mg/dL Final    POCT Glucose 03/05/2022 231 (A) 70 - 110 mg/dL Final    Sodium 03/05/2022 141  136 - 145 mmol/L Final    Potassium 03/05/2022 4.3  3.5 - 5.1 mmol/L Final    Chloride 03/05/2022 110  95 - 110 mmol/L Final    CO2 03/05/2022 22 (A) 23 - 29 mmol/L Final    Glucose 03/05/2022 178 (A) 70 - 110 mg/dL Final    BUN 03/05/2022 13  8 - 23 mg/dL Final    Creatinine 03/05/2022 0.8  0.5 - 1.4 mg/dL Final    Calcium 03/05/2022 8.7  8.7 - 10.5 mg/dL Final    Anion Gap 03/05/2022 9  8 - 16 mmol/L Final    eGFR if African American 03/05/2022 >60.0  >60 mL/min/1.73 m^2 Final    eGFR if non African American 03/05/2022 >60.0  >60 mL/min/1.73 m^2 Final    WBC 03/05/2022 4.96   3.90 - 12.70 K/uL Final    RBC 03/05/2022 3.19 (A) 4.00 - 5.40 M/uL Final    Hemoglobin 03/05/2022 10.4 (A) 12.0 - 16.0 g/dL Final    Hematocrit 03/05/2022 32.3 (A) 37.0 - 48.5 % Final    MCV 03/05/2022 101 (A) 82 - 98 fL Final    MCH 03/05/2022 32.6 (A) 27.0 - 31.0 pg Final    MCHC 03/05/2022 32.2  32.0 - 36.0 g/dL Final    RDW 03/05/2022 13.0  11.5 - 14.5 % Final    Platelets 03/05/2022 66 (A) 150 - 450 K/uL Final    MPV 03/05/2022 11.2  9.2 - 12.9 fL Final    POCT Glucose 03/05/2022 170 (A) 70 - 110 mg/dL Final    POCT Glucose 03/05/2022 138 (A) 70 - 110 mg/dL Final        Meds   Current Facility-Administered Medications   Medication Dose Route Frequency Provider Last Rate Last Admin    acetaminophen tablet 1,000 mg  1,000 mg Oral Q8H Kirsten Montgomery MD   1,000 mg at 03/05/22 1311    atorvastatin tablet 40 mg  40 mg Oral Daily Kirsten Montgomery MD   40 mg at 03/05/22 0908    celecoxib capsule 100 mg  100 mg Oral BID Canelo Zhao MD   100 mg at 03/05/22 0907    dextrose 10% bolus 125 mL  12.5 g Intravenous PRN Kirsten Montgomery MD        DULoxetine DR capsule 30 mg  30 mg Oral BID Kirsten Montgomery MD   30 mg at 03/05/22 0907    enoxaparin injection 40 mg  40 mg Subcutaneous Daily Kirsten Montgomery MD   40 mg at 03/04/22 1703    gabapentin capsule 600 mg  600 mg Oral BID Canelo Zhao MD   600 mg at 03/05/22 0907    glucagon (human recombinant) injection 1 mg  1 mg Intramuscular PRN Kirsten Montgomery MD        insulin aspart U-100 pen 1-10 Units  1-10 Units Subcutaneous Q6H PRN Kirsten Montgomery MD   2 Units at 03/05/22 0048    levothyroxine tablet 75 mcg  75 mcg Oral Before breakfast Kirsten Montgomery MD   75 mcg at 03/05/22 0541    methocarbamoL tablet 500 mg  500 mg Oral QID Canelo Zhao MD   500 mg at 03/05/22 1311    montelukast tablet 10 mg  10 mg Oral Daily Kirsten Montgomery MD   10 mg at 03/05/22 0907    mupirocin 2 % ointment 1 g  1 g Nasal BID Kirsten Montgomery MD   1  g at 03/05/22 0908    ondansetron injection 4 mg  4 mg Intravenous Q12H PRN Kirsten Montgomery MD        oxyCODONE immediate release tablet 5 mg  5 mg Oral Q4H PRN Canelo Zhao MD        oxyCODONE immediate release tablet Tab 10 mg  10 mg Oral Q4H PRN Canelo Zhao MD        pantoprazole EC tablet 20 mg  20 mg Oral Daily Kirsten Montgomery MD   20 mg at 03/05/22 0908    polyethylene glycol packet 17 g  17 g Oral Daily Kirsten Montgomery MD   17 g at 03/05/22 0907    psyllium husk (aspartame) 3.4 gram packet 1 packet  1 packet Oral BID PRN Kirsten Montgomery MD        ROPIvacaine (PF) 2 mg/ml (0.2%) solution   Perineural Continuous Kirsten Montgomery MD   200 mL at 03/04/22 0959    ROPIvacaine (PF) 2 mg/ml (0.2%) solution   Perineural Continuous Kirsten Montgomery MD   200 mL at 03/04/22 0959    senna-docusate 8.6-50 mg per tablet 1 tablet  1 tablet Oral BID PRN Kirsten Montgomery MD             Assessment:     Pain control adequate with current regimen    Plan:     Discussed with primary team plans for patient to discharge home today.    Bilateral PNCs paused this AM, which patient tolerated very well without increase in pain. PNCs pulled this afternoon with blue tips intact bilaterally.   Can continue oral multimodal analgesia at time of discharge home.      Case discussed with staff, Dr. Scherer; final recommendations per attestation above.    Thank you for the consult and allowing us to participate in the care of this patient. We will sign off now. Please call Acute Pain Service/Anesthesia if you have any further questions or concerns.      Primo Jacobs MD  Department of Anesthesiology  Acute Pain Service - [id number]  [facility]

## 2022-03-07 ENCOUNTER — PATIENT MESSAGE (OUTPATIENT)
Dept: INTERNAL MEDICINE | Facility: CLINIC | Age: 77
End: 2022-03-07
Payer: MEDICARE

## 2022-03-07 ENCOUNTER — PATIENT OUTREACH (OUTPATIENT)
Dept: ADMINISTRATIVE | Facility: CLINIC | Age: 77
End: 2022-03-07
Payer: MEDICARE

## 2022-03-07 LAB — POCT GLUCOSE: 203 MG/DL (ref 70–110)

## 2022-03-07 NOTE — PROGRESS NOTES
C3 nurse spoke with Anayeli Judd   for a TCC post hospital discharge follow up call. The patient has a scheduled HOSFU appointment with Kishore Coburn 3/8/22 @ 8am.    Please do not reply to this message, as this inbox is not routinely monitored.

## 2022-03-08 ENCOUNTER — OFFICE VISIT (OUTPATIENT)
Dept: HOME HEALTH SERVICES | Facility: CLINIC | Age: 77
End: 2022-03-08
Payer: MEDICARE

## 2022-03-08 DIAGNOSIS — R60.0 PERIPHERAL EDEMA: ICD-10-CM

## 2022-03-08 DIAGNOSIS — K66.0 ADHESION OF ABDOMINAL WALL: ICD-10-CM

## 2022-03-08 DIAGNOSIS — E11.9 TYPE 2 DIABETES MELLITUS WITHOUT COMPLICATION, WITHOUT LONG-TERM CURRENT USE OF INSULIN: Chronic | ICD-10-CM

## 2022-03-08 DIAGNOSIS — R26.81 GAIT INSTABILITY: Primary | Chronic | ICD-10-CM

## 2022-03-08 PROCEDURE — 99350 PR HOME VISIT,ESTAB PATIENT,LEVEL IV: ICD-10-PCS | Mod: S$GLB,,, | Performed by: NURSE PRACTITIONER

## 2022-03-08 PROCEDURE — 99350 HOME/RES VST EST HIGH MDM 60: CPT | Mod: S$GLB,,, | Performed by: NURSE PRACTITIONER

## 2022-03-09 ENCOUNTER — DOCUMENT SCAN (OUTPATIENT)
Dept: HOME HEALTH SERVICES | Facility: HOSPITAL | Age: 77
End: 2022-03-09
Payer: MEDICARE

## 2022-03-09 VITALS
RESPIRATION RATE: 18 BRPM | SYSTOLIC BLOOD PRESSURE: 111 MMHG | HEART RATE: 66 BPM | OXYGEN SATURATION: 99 % | DIASTOLIC BLOOD PRESSURE: 53 MMHG | TEMPERATURE: 98 F

## 2022-03-09 PROBLEM — R60.0 PERIPHERAL EDEMA: Status: ACTIVE | Noted: 2022-03-09

## 2022-03-09 NOTE — PROGRESS NOTES
"[facility]  Transition of Care Home Visit    Visit Date: 3/8/2022  Encounter Provider: Dale Munoz NP  PCP:  Darci Guthrie MD    PRESENTING HISTORY      Patient ID: Anayeli Judd 77 y.o. female.    Consult Requested By:  No ref. provider found  Reason for Consult:  Transitional Care Coordination    Chief Complaint: Transitional Care     The patient is being seen at home due to a physical debility that presents a taxing effort to leave the home, to mitigate high risk of hospital readmission or due to the limited availability of reliable or safe options for transportation to the point of access to the provider. The visit meets the criteria for medical necessity as defined by CMS as "health-care services needed to prevent, diagnose, or treat an illness, injury, condition, disease, or its symptoms and that meet accepted standards of medicine." Prior to treatment on this visit the chart was reviewed and patient consent was obtained.    HPI: Anayeli Judd is a 77 y.o. year old female hx of DM, colon cancer(s/p transverse colectomy), UE DVT(no blood thinners), who presents to the general surgery clinic on 02/08/2022  for recurrent episodes of abdominal pain requiring multiple ER visit with SBO.. Pt has been having these episodes for a while no, however, she feels a though they have become more frequently since thanksgiving. They have be to the ER 5 times since then. Pt notes crampy abdominal pain. She notes she had a an episode of vomiting with 1 episode but usually does not experience nausea or vomiting. When she goes to the ED her symptoms resolve after she is given contrast for imaging study. She denies other complaints.      Hospital Course: Patient presented as above and underwent attempted ex-lap on 3/4 which was aborted early given extent of intra-abdominal adhesions on 3/4. Following the case she was taken to PACU and then the floor where labs and vitals remained stable and appropriate. Diet " was advanced and well tolerated. KURTIS catheters were removed and pain was controlled with oral meds. Given this she was deemed stable for discharge home with follow up.     Today:  Ms. Anayeli Judd is a 77 y.o. female is being seen and examined at home today for transitional care visit to the home environment post-discharge from inpatient hospitalization encounter described above. Anayeli presents at baseline state of health as reported by patient and caregiver. VSS. Reports tolerating po well, good bm pattern, good sleep pattern.  She has private caregivers in home that assist with ADLs.  She is aware of upcoming scheduled appointments.  No complaints or concerns at this time.  Deneis abdominal pain, constipation, nausea, vomiting, chest pain, SOB, fever, chills, cough, congestion. Reports taking all medications as prescribed. No other needs identified at this time. Risks of environmental exposure to coronavirus discussed including: social distancing, hand hygiene, and limiting departures from the home for necessities only.  Reports understanding and willingness to comply.     Attestation: Screening criteria to assess the level of the patient's risk for infection with COVID-19 as recommended by the CDC at the time of the above documented home visit concluded appropriateness to proceed. Universal precautions were maintained at all times, including provider use of >60% alcohol gel hand  immediately prior to entry and upon departing the patient's home as well as cleaning of equipment used in home visit with antibacterial/germicidal disposable wipes.    Admission Date: 3/4/2022  Hospital Length of Stay: 1 days  Discharge Date and Time:  03/05/2022 12:52 PM:   _________________________________________________________________    Review of Systems   Constitutional: Negative for chills and fever.   HENT: Negative for congestion and rhinorrhea.    Eyes: Negative for visual disturbance.   Respiratory: Negative for  chest tightness and shortness of breath.    Cardiovascular: Positive for leg swelling. Negative for chest pain and palpitations.   Gastrointestinal: Negative for abdominal pain, constipation, diarrhea, nausea and vomiting.   Genitourinary: Negative for difficulty urinating.   Musculoskeletal: Negative for arthralgias and myalgias.   Skin: Positive for wound.   Neurological: Negative for dizziness and weakness.   Hematological: Does not bruise/bleed easily.   Psychiatric/Behavioral: Negative for agitation.     Assessments:  · Environmental: 7th floor condo, no steps to enter, adequate lighting and temperature control  · Functional Status: Assistance with ADL's/IADL's, ambulates with assistance of a cane/walker, continent of bowel and bladder  · Safety: Fall Precautions, COVID Precautions/Social Distancing/Mask Use  · Nutritional: Adequate  · Home Health: n/a  · DME/Supplies: RW      PAST HISTORY:     Past Medical History:   Diagnosis Date    Abdominal pain     Allergic rhinitis     Arthritis     Blood platelet disorder     Blood platelet disorder     Blood transfusion     during delivery and     Bowel obstruction     Cervical radiculopathy     followed by dr cloud    Colon cancer     transverse colon; resected; Stage IIA (pT3 pN0 MX)    Diabetes mellitus     Diarrhea     Falls 2020    Family history of breast cancer     Family history of colon cancer     Fatty liver     GERD (gastroesophageal reflux disease)     History of shingles     Hyperlipidemia     Hypothyroidism     Irritable bowel syndrome     Microscopic colitis     treated     Myelodysplastic syndrome     Raynaud phenomenon     Raynaud's disease     Type 2 diabetes mellitus        Past Surgical History:   Procedure Laterality Date    APPENDECTOMY      BACK SURGERY      CARPAL TUNNEL RELEASE      bilateral      SECTION      CHOLECYSTECTOMY  1965    COLECTOMY  2011    Transverse colon resection  by Dr. Aguirre    COLONOSCOPY N/A 7/27/2017    Procedure: COLONOSCOPY;  Surgeon: Manjit Alvarez MD;  Location: [facility] (4TH FLR);  Service: Endoscopy;  Laterality: N/A;    COLONOSCOPY N/A 6/26/2019    Procedure: COLONOSCOPY;  Surgeon: Ramiro Jefferson MD;  Location: [facility] ENDO (4TH FLR);  Service: Endoscopy;  Laterality: N/A;  PM prep    CYSTOSCOPY N/A 11/9/2021    Procedure: CYSTOSCOPY;  Surgeon: Viridiana Valenzuela MD;  Location: [facility] OR 1ST FLR;  Service: Urology;  Laterality: N/A;    ENDOSCOPIC ULTRASOUND OF UPPER GASTROINTESTINAL TRACT N/A 12/12/2018    Procedure: ULTRASOUND, UPPER GI TRACT, ENDOSCOPIC;  Surgeon: Jose Hess MD;  Location: [facility];  Service: Endoscopy;  Laterality: N/A;    ENDOSCOPIC ULTRASOUND OF UPPER GASTROINTESTINAL TRACT N/A 1/23/2019    Procedure: ULTRASOUND, UPPER GI TRACT, ENDOSCOPIC;  Surgeon: Jose Hess MD;  Location: [facility] (2ND FLR);  Service: Endoscopy;  Laterality: N/A;    ESOPHAGOGASTRODUODENOSCOPY N/A 11/16/2018    Procedure: EGD (ESOPHAGOGASTRODUODENOSCOPY);  Surgeon: Angelo Reynolds MD;  Location: [facility] (2ND FLR);  Service: Endoscopy;  Laterality: N/A;    ESOPHAGOGASTRODUODENOSCOPY N/A 6/26/2019    Procedure: EGD (ESOPHAGOGASTRODUODENOSCOPY);  Surgeon: Ramiro Jefferson MD;  Location: [facility] (4TH FLR);  Service: Endoscopy;  Laterality: N/A;    EYE SURGERY      Cataract Removal    FLUOROSCOPIC URODYNAMIC STUDY N/A 11/9/2021    Procedure: URODYNAMIC STUDY, FLUOROSCOPIC;  Surgeon: Viridiana Valenzuela MD;  Location: [facility] OR 1ST FLR;  Service: Urology;  Laterality: N/A;  90 minutes     HYSTERECTOMY      posterolateral fusion with autograft bone and [place] mineralized bone matrix  2/1/13    at [facility] for lumbar spine stenosis    TOE SURGERY      TONSILLECTOMY      TRIGGER FINGER RELEASE         Family History   Problem Relation Age of Onset    Heart disease Father 50        Mi age 50    Colon cancer Father     Bladder Cancer Mother          non smoker    Cataracts Mother     Glaucoma Mother     Heart disease Mother     Hyperlipidemia Mother     Kidney disease Mother     Breast cancer Sister 79    Arthritis Daughter     Asthma Daughter     Depression Daughter     Hypertension Daughter     Stroke Daughter 40    Arthritis Brother     Colon cancer Brother 70    Alcohol abuse Brother     Parkinsonism Brother     Alcohol abuse Brother     Depression Daughter     Celiac disease Neg Hx     Cirrhosis Neg Hx     Colon polyps Neg Hx     Crohn's disease Neg Hx     Cystic fibrosis Neg Hx     Esophageal cancer Neg Hx     Hemochromatosis Neg Hx     Inflammatory bowel disease Neg Hx     Irritable bowel syndrome Neg Hx     Liver cancer Neg Hx     Liver disease Neg Hx     Rectal cancer Neg Hx     Stomach cancer Neg Hx     Ulcerative colitis Neg Hx     Eliazar's disease Neg Hx     Amblyopia Neg Hx     Blindness Neg Hx     Macular degeneration Neg Hx     Retinal detachment Neg Hx     Strabismus Neg Hx     Melanoma Neg Hx        Social History     Socioeconomic History    Marital status:    Tobacco Use    Smoking status: Never Smoker    Smokeless tobacco: Never Used   Substance and Sexual Activity    Alcohol use: Yes     Comment: socially, hardly ever    Drug use: No    Sexual activity: Not Currently   Social History Narrative    , lives alone.  Primary support are her children and friends.       MEDICATIONS & ALLERGIES:     Current Outpatient Medications on File Prior to Visit   Medication Sig Dispense Refill    acetaminophen (TYLENOL) 650 MG TbSR Take 650 mg by mouth once as needed (pain).      ascorbic acid, vitamin C, (VITAMIN C) 1000 MG tablet Take 1,000 mg by mouth once daily.      atorvastatin (LIPITOR) 40 MG tablet TAKE 1 TABLET BY MOUTH  DAILY 90 tablet 3    azelastine (ASTELIN) 137 mcg (0.1 %) nasal spray 2 sprays (274 mcg total) by Nasal route 2 (two) times daily as needed for Rhinitis. (Patient not  taking: Reported on 3/7/2022)      biotin 10,000 mcg TbDL Take 1 tablet by mouth once daily.       co-enzyme Q-10 30 mg capsule Take 30 mg by mouth 3 (three) times daily.      conjugated estrogens (PREMARIN) vaginal cream INSERT 1/2 GRAM VAGINALLY  TWICE WEEKLY 30 g 3    cranberry extract 200 mg Cap Take 1 capsule by mouth once daily.      D-MANNOSE ORAL Take 1 tablet by mouth once daily.       DULoxetine (CYMBALTA) 30 MG capsule Take 1 capsule (30 mg total) by mouth 2 (two) times daily. 180 capsule 3    flash glucose sensor (FREESTYLE EFREN 14 DAY SENSOR) Kit 1 application by Misc.(Non-Drug; Combo Route) route every 14 (fourteen) days. Disp 30 or 90 day refill 6 kit 6    fluticasone propionate (FLONASE) 50 mcg/actuation nasal spray 1 spray by Each Nostril route daily as needed for Rhinitis.      FREESTYLE EFREN 10 DAY READER [facility] TEST as directed 3 each 3    FREESTYLE LITE STRIPS Strp TEST DAILY AS DIRECTED 50 strip 2    gabapentin (NEURONTIN) 600 MG tablet Take 1 tablet (600 mg total) by mouth 2 (two) times daily. (Patient taking differently: Take 600 mg by mouth once daily.) 180 tablet 3    hydrocortisone 2.5 % cream Apply topically 2 (two) times daily as needed. 1 each 1    hyoscyamine (ANASPAZ,LEVSIN) 0.125 mg Tab Take 1 tablet (125 mcg total) by mouth every 8 (eight) hours as needed (urgency). 90 tablet 3    levothyroxine (SYNTHROID) 75 MCG tablet Take 1 tablet (75 mcg total) by mouth before breakfast. 90 tablet 3    LIDOcaine (LIDODERM) 5 % Place 1 patch onto the skin once daily. Remove & Discard patch within 12 hours or as directed by MD 30 patch 0    magnesium 250 mg Tab Take 1,000 mg by mouth once daily.      metFORMIN (GLUCOPHAGE) 500 MG tablet Take 500 mg by mouth 2 (two) times daily with meals.      montelukast (SINGULAIR) 10 mg tablet TAKE 1 TABLET BY MOUTH  DAILY 90 tablet 3    ondansetron (ZOFRAN-ODT) 8 MG TbDL Take 1 tablet (8 mg total) by mouth every 8 (eight) hours as needed  (nausea). 30 tablet 0    oxyCODONE (ROXICODONE) 5 MG immediate release tablet Take 1 tablet (5 mg total) by mouth every 6 (six) hours as needed for Pain. 20 tablet 0    pantoprazole (PROTONIX) 20 MG tablet Take 1 tablet (20 mg total) by mouth once daily. 30 tablet 2    polyethylene glycol (GLYCOLAX) 17 gram/dose powder Take 17 g by mouth before dinner. 1530 g 1    psyllium husk, aspartame, (METAMUCIL) 3.4 gram PwPk packet Take 1 packet by mouth 2 (two) times daily as needed (constipation).      senna-docusate 8.6-50 mg (PERICOLACE) 8.6-50 mg per tablet Take 1 tablet by mouth 2 (two) times daily as needed for Constipation.      vitamin D 1000 units Tab Take 1,000 Units by mouth once daily. D3       No current facility-administered medications on file prior to visit.        Review of patient's allergies indicates:   Allergen Reactions    Codeine Itching and Nausea And Vomiting    Dilaudid [hydromorphone (bulk)] Other (See Comments)     Oversedating, head burning. Pt prefers to avoid.       Percocet [oxycodone-acetaminophen] Itching    Sulfa (sulfonamide antibiotics) Itching and Nausea And Vomiting           Latex, natural rubber Rash       OBJECTIVE:     Vital Signs:  Vitals:    03/08/22 1200   BP: (!) 111/53   Pulse: 66   Resp: 18   Temp: 98 °F (36.7 °C)     There is no height or weight on file to calculate BMI.       Physical Exam  Constitutional:       Appearance: Normal appearance.   HENT:      Head: Normocephalic and atraumatic.      Nose: No congestion or rhinorrhea.      Mouth/Throat:      Mouth: Mucous membranes are moist.   Cardiovascular:      Rate and Rhythm: Normal rate.   Pulmonary:      Effort: No respiratory distress.      Breath sounds: Normal breath sounds.   Abdominal:      General: Bowel sounds are normal.      Palpations: Abdomen is soft.   Musculoskeletal:      Cervical back: Normal range of motion and neck supple.      Right lower leg: Edema present.      Left lower leg: Edema present.    Skin:     General: Skin is warm and dry.      Comments: Midline line abdominal incision, close and approximated, no drainage    Neurological:      Mental Status: She is alert. Mental status is at baseline.   Psychiatric:         Mood and Affect: Mood normal.         Laboratory  Lab Results   Component Value Date    WBC 4.96 03/05/2022    HGB 10.4 (L) 03/05/2022    HCT 32.3 (L) 03/05/2022     (H) 03/05/2022    PLT 66 (L) 03/05/2022     Lab Results   Component Value Date    INR 1.1 02/11/2022    INR 1.1 02/10/2022    INR 1.0 05/14/2021     Lab Results   Component Value Date    HGBA1C 6.9 (H) 01/24/2022     No results for input(s): POCTGLUCOSE in the last 72 hours.    TRANSITION OF CARE:     [facility] Contact Note: 3/7/22    Family and/or Caretaker present at visit?  Yes.  Diagnostic tests reviewed/disposition: No diagnosic tests pending after this hospitalization.  Disease/illness education: Importance of Compliance with all prescribed medications and treatments, COVID Precautions/Social Distancing/Mask Use  Home health/community services discussion/referrals: Patient does not have home health established from hospital visit.  They do not need home health.  If needed, we will set up home health for the patient.   Establishment or re-establishment of referral orders for community resources: No other necessary community resources.   Discussion with other health care providers: No discussion with other health care providers necessary.     Transition of Care Visit:  I have reviewed and updated the history and problem list. I have reconciled the medication list. I have discussed the hospitalization and current medical issues, prognosis and plans with the patient/family.     Medications Reconciliation:   I have reconciled the patient's home medications and discharge medications with the patient/family. I have updated all changes. Refer to After-Visit Medication List.    Discharge plans, follow-up  instructions, future appointments, provider contact information, indicators to seek emergency treatment and encouragement to call for any questions, concerns or clarification of the patient's plan of care explained to patient and/or caregiver(s), whom confirm understanding of provided information and endorse willingness to comply.     ASSESSMENT & PLAN:     HIGH RISK CONDITION(S):  Patient has a condition that poses threat to life and bodily function: s/p exploratory laparotomy     June was seen today for transitional care.    Diagnoses and all orders for this visit:    Type 2 diabetes mellitus without complication, without long-term current use of insulin    Adhesion of abdominal wall    Peripheral edema        Problem List Items Addressed This Visit        Endocrine    Type 2 diabetes mellitus without complication, without long-term current use of insulin (Chronic)     Controlled  Continue glucose monitoring  Diabetic diet  Continue medication as prescribed               GI    Adhesion of abdominal wall     attempted ex-lap on 3/4 which was aborted early given extent of intra-abdominal adhesions on 3/4.  Currently tolerating diet well, good bm pattern.  Keep scheduled follow up appointments                   Other    Peripheral edema     Mild peripheral edema noted on exam  Compression stocking  Elevate lower extremities while sitting/lying                  Instructions:  - [facility] Nurse Practitioner to schedule home follow-up visit with patient in 4-6 weeks or as needed.  - Continue all medications, treatments and therapies as ordered.   - Follow all instructions, recommendations as discussed.  - Maintain Safety Precautions at all times.  - Attend all medical appointments as scheduled.  - For worsening symptoms: call Primary Care Physician or Nurse Practitioner.  - For emergencies, call 911 or immediately report to the nearest emergency room.  - Limit Risks of environmental exposure to coronavirus/COVID-19 as  discussed including: social distancing, hand hygiene, and limiting departures from the home for necessities only.        Were controlled substances prescribed?  No    Instructions for the patient:    Scheduled Follow-up :  Future Appointments   Date Time Provider Department Center   3/15/2022  9:45 AM Leonidas Barriga MD [facility] GENSUR [facility]   3/17/2022 10:00 AM Ramiro Jefferson MD [facility] GASTRO [facility]   3/21/2022 12:00 PM [facility]   3/21/2022  1:00 PM Zeke Cabezas MD [facility] HC BMT [facility]   3/21/2022  2:00 PM Darci Guthrie MD [facility] CH INTM [facility]   4/6/2022  2:20 PM Viridiana Valenzuela MD [facility] UROLOGY [facility]   4/19/2022  8:00 AM Camille Munoz NP [address]   5/25/2022  2:40 PM Horacio Mitchell MD [facility] NEURO8 [facility]       After Visit Medication List :     Medication List          Accurate as of March 8, 2022 11:59 PM. If you have any questions, ask your nurse or doctor.            CHANGE how you take these medications    gabapentin 600 MG tablet  Commonly known as: NEURONTIN  Take 1 tablet (600 mg total) by mouth 2 (two) times daily.  What changed: when to take this        CONTINUE taking these medications    acetaminophen 650 MG Tbsr  Commonly known as: TYLENOL     ascorbic acid (vitamin C) 1000 MG tablet  Commonly known as: VITAMIN C     atorvastatin 40 MG tablet  Commonly known as: LIPITOR  TAKE 1 TABLET BY MOUTH  DAILY     azelastine 137 mcg (0.1 %) nasal spray  Commonly known as: ASTELIN  2 sprays (274 mcg total) by Nasal route 2 (two) times daily as needed for Rhinitis.     biotin 10,000 mcg Tbdl     co-enzyme Q-10 30 mg capsule     cranberry extract 200 mg Cap     D-MANNOSE ORAL     DULoxetine 30 MG capsule  Commonly known as: CYMBALTA  Take 1 capsule (30 mg total) by mouth 2 (two) times daily.     fluticasone propionate 50 mcg/actuation nasal spray  Commonly known as: FLONASE     FREESTYLE EFREN 10 DAY READER Misc  Generic drug: flash glucose scanning  reader  TEST as directed     FREESTYLE EFREN 14 DAY SENSOR Kit  Generic drug: flash glucose sensor  1 application by Misc.(Non-Drug; Combo Route) route every 14 (fourteen) days. Disp 30 or 90 day refill     FREESTYLE LITE STRIPS Strp  Generic drug: blood sugar diagnostic  TEST DAILY AS DIRECTED     hydrocortisone 2.5 % cream  Apply topically 2 (two) times daily as needed.     hyoscyamine 0.125 mg Tab  Commonly known as: ANASPAZ,LEVSIN  Take 1 tablet (125 mcg total) by mouth every 8 (eight) hours as needed (urgency).     levothyroxine 75 MCG tablet  Commonly known as: SYNTHROID  Take 1 tablet (75 mcg total) by mouth before breakfast.     LIDOcaine 5 %  Commonly known as: LIDODERM  Place 1 patch onto the skin once daily. Remove & Discard patch within 12 hours or as directed by MD     magnesium 250 mg Tab     metFORMIN 500 MG tablet  Commonly known as: GLUCOPHAGE     montelukast 10 mg tablet  Commonly known as: SINGULAIR  TAKE 1 TABLET BY MOUTH  DAILY     ondansetron 8 MG Tbdl  Commonly known as: ZOFRAN-ODT  Take 1 tablet (8 mg total) by mouth every 8 (eight) hours as needed (nausea).     oxyCODONE 5 MG immediate release tablet  Commonly known as: ROXICODONE  Take 1 tablet (5 mg total) by mouth every 6 (six) hours as needed for Pain.     pantoprazole 20 MG tablet  Commonly known as: PROTONIX  Take 1 tablet (20 mg total) by mouth once daily.     polyethylene glycol 17 gram/dose powder  Commonly known as: GLYCOLAX  Take 17 g by mouth before dinner.     PREMARIN vaginal cream  Generic drug: conjugated estrogens  INSERT 1/2 GRAM VAGINALLY  TWICE WEEKLY     psyllium husk (aspartame) 3.4 gram Pwpk packet  Commonly known as: METAMUCIL  Take 1 packet by mouth 2 (two) times daily as needed (constipation).     senna-docusate 8.6-50 mg 8.6-50 mg per tablet  Commonly known as: PERICOLACE  Take 1 tablet by mouth 2 (two) times daily as needed for Constipation.     vitamin D 1000 units Tab  Commonly known as: VITAMIN D3             Patient consent was obtained prior to treatment on this visit.    Attestation: Screening criteria to assess the level of the patient's risk for infection with COVID-19 as recommended by the CDC at the time of the above documented home visit concluded appropriateness to proceed. Universal precautions were maintained at all times, including provider use of >60% alcohol gel hand  immediately prior to entry and upon departing the patient's home as well as cleaning of equipment used in home visit with antibacterial/germicidal disposable wipes.     Signature:       ROBIN Salcido-PITO   [facility]    Total Face-to-Face Encounter: 60 minutes with >50% of time spent discussing the care with the patient/family.

## 2022-03-09 NOTE — ASSESSMENT & PLAN NOTE
attempted ex-lap on 3/4 which was aborted early given extent of intra-abdominal adhesions on 3/4.  Currently tolerating diet well, good bm pattern.  Keep scheduled follow up appointments

## 2022-03-09 NOTE — ASSESSMENT & PLAN NOTE
Mild peripheral edema noted on exam  Compression stocking  Elevate lower extremities while sitting/lying

## 2022-03-09 NOTE — PATIENT INSTRUCTIONS
Instructions:  - [facility] to schedule home follow-up visit with patient in 4-6 weeks or as needed.  - Continue all medications, treatments and therapies as ordered.   - Follow all instructions, recommendations as discussed.  - Maintain Safety Precautions at all times.  - Attend all medical appointments as scheduled.  - For worsening symptoms: call Primary Care Physician or Nurse Practitioner.  - For emergencies, call 911 or immediately report to the nearest emergency room.  - Limit Risks of environmental exposure to coronavirus/COVID-19 as discussed including: social distancing, hand hygiene, and limiting departures from the home for necessities only.

## 2022-03-15 ENCOUNTER — PATIENT MESSAGE (OUTPATIENT)
Dept: ADMINISTRATIVE | Facility: OTHER | Age: 77
End: 2022-03-15
Payer: MEDICARE

## 2022-03-15 ENCOUNTER — OFFICE VISIT (OUTPATIENT)
Dept: SURGERY | Facility: CLINIC | Age: 77
End: 2022-03-15
Payer: MEDICARE

## 2022-03-15 ENCOUNTER — OUTPATIENT CASE MANAGEMENT (OUTPATIENT)
Dept: ADMINISTRATIVE | Facility: OTHER | Age: 77
End: 2022-03-15
Payer: MEDICARE

## 2022-03-15 VITALS
SYSTOLIC BLOOD PRESSURE: 119 MMHG | BODY MASS INDEX: 24.19 KG/M2 | DIASTOLIC BLOOD PRESSURE: 59 MMHG | HEIGHT: 62 IN | WEIGHT: 131.44 LBS | HEART RATE: 78 BPM

## 2022-03-15 DIAGNOSIS — Z09 POSTOP CHECK: Primary | ICD-10-CM

## 2022-03-15 PROCEDURE — 99999 PR PBB SHADOW E&M-EST. PATIENT-LVL III: CPT | Mod: PBBFAC,,, | Performed by: SURGERY

## 2022-03-15 PROCEDURE — 99999 PR PBB SHADOW E&M-EST. PATIENT-LVL III: ICD-10-PCS | Mod: PBBFAC,,, | Performed by: SURGERY

## 2022-03-15 PROCEDURE — 99024 PR POST-OP FOLLOW-UP VISIT: ICD-10-PCS | Mod: POP,,, | Performed by: SURGERY

## 2022-03-15 PROCEDURE — 99213 OFFICE O/P EST LOW 20 MIN: CPT | Mod: PBBFAC | Performed by: SURGERY

## 2022-03-15 PROCEDURE — 99024 POSTOP FOLLOW-UP VISIT: CPT | Mod: POP,,, | Performed by: SURGERY

## 2022-03-15 NOTE — LETTER
[facility]  [address]  [facility] [address]  Phone: [phone] March 15, 2022        Darci Guthrie MD  [address]  [facility] [address]    Patient: Anayeli Judd   MR Number: [id number]   YOB: 1945   Date of Visit: 3/15/2022     Dear Dr. Guthrie:    Thank you for referring Anayeli Judd to me for evaluation. Attached you will find relevant portions of my assessment and plan of care.    If you have questions, please do not hesitate to call me. I look forward to following Anayeli Judd along with you.    Sincerely,          Leonidas Barriga MD    CC  MD Farideh Phelpsosure

## 2022-03-15 NOTE — PROGRESS NOTES
Ivon Judd is a 76 y/o female pt w/PMHx of T2DM, colon CA, SBO, GERD, myelodysplastic syndrome, and fatty liver presenting s/p exploratory laparatomy due to SBO.    Findings during procedure show extremely dense adhesions and excessively hemorrhagic tissues.  Operation concluded after risk was determined to be too high after opening the upper midline incision through the fascia.      Subjective: Patient doing well post op.  Activity returning to normal.  Pain well controlled not needing narcotics.  Tolerating a diet without nausea or vomiting.  Having normal regular bowel movements.  Incision (1 midline extending from mid-sternal/umbilical region to mid-umbilical/suprapubic region) healing well without drainage.    Review of Systems   Constitutional: Positive for chills (Every day, in the afternoon) and malaise/fatigue (worsening. Onsets after sleep, requires naps during the day.). Negative for fever.   HENT: Positive for sinus pain. Negative for sore throat.    Respiratory: Positive for sputum production. Negative for cough, shortness of breath and wheezing.    Cardiovascular: Negative for chest pain and palpitations.   Gastrointestinal: Positive for abdominal pain (around suture regions) and heartburn. Negative for constipation, diarrhea, nausea and vomiting.        Pt states she feels very bloated, onset in a couple of hours.   Genitourinary: Positive for frequency. Negative for urgency.        Problems initiating urine   Neurological: Positive for weakness and headaches (Everyday).       Pt states she has pain w/tension-like sensation, onset when bending over, 10/10 severity lasting for a couple minutes.    She also takes Metamucil, states she is down from 1 capful to 3/4 capful, stools have hardened to more of a firmer texture. Mucus in stool has decreased post-surgery.    Pt also is on a low fibre diet, which she states helps with stools. GI stated pt could push taking metamucil to every other day. Also takes  miralax every day, wants to take every other day but still wants to avoid obstruction.    Pt c/o of severe GERD. Nexium was cut down from 40 mg to 20 mg, heartburn has been worsening since then for a couple of months.    Has been watching blood sugars daily. States numbers hover around ~133 range, 162 today. Has been on metformin 500 mg 1x/day starting a month ago, but has increased to BID.    Objective:   Vitals:    03/15/22 0927   BP: (!) 119/59   Pulse: 78         Incision sites clean, dry, and intact.  No erythema or drainage.  Abdomen soft and nontender.      Assessment: Ivon Judd is a 76 y/o female pt w/PMHx of T2DM, colon CA, SBO, GERD, myelodysplastic syndrome, and fatty liver presenting s/p exploratory laparatomy due to SBO.    Plan:  - Can follow up as needed  - Continue to not lift more than 10-15lbs for 4 more weeks  - Can call the clinic with any questions or concerns

## 2022-03-15 NOTE — PROGRESS NOTES
Outpatient Care Management  Initial Patient Assessment    Patient: Anayeli Judd  MRN: [id number]  Date of Service: 03/15/2022  Completed by: Shanthi Whitaker RN  Referral Date: 03/04/2022  Program:     Reason for Visit   Patient presents with    OPCM Chart Review     3/15/2022    Initial Assessment     3/15/2022    OPCM Enrollment Call    Plan Of Care    PHQ-9       Brief Summary:  Anayeli Judd was referred by Dr. Leonidas Barriga for Type 2 DM, elevated liver enzymes and gait instability. Patient qualifies for program based on risk score of 76.1%.   Active problem list, medical, surgical and social history reviewed. Active comorbidities include DM2, GERD, Hyperlipidemia, Hypothyroidism. Areas of need identified by patient include DM2 and falls prevention.   Complex care plan created with patient/caregiver input. By next encounter, patient agrees to read materials on DM2 and falls prevention sent, use a cane or walker when ambulating, participate in PT/OT with home health, .     Next steps: Follow up phone call by 3/25,  Follow up on receipt of educational materials,  Discuss diabetic dietary habits and plate method,  Discuss why difficult to stick to diabetic diet, likes and don't like        Assessment Documentation     OPCM Initial Assessment    Involvement of Care  Do I have permission to speak with other family members about your care?: Yes  Assessment completed by: Patient  Identified Areas of Need  Advanced Care Planning: No  Housing: no  Medication Adherence: No  *Active medication list was reviewed and reconciled with patient and/or caregiver:   Nutrition: yes  Lab Adherence: no  Depression: No  Cognitive/Behavioral Health: no  Communication: no  Health Literacy: no  Fall risk?: No  Equipment/Supplies/Services: no          Problem List and History     Problems Addressed This Visit    Diabetes: Identified as current problem  Anemia: Not identified by patient as current problem  Chronic Kidney  Disease: Not identified by patient as current problem  Hyperlipidemia: Not identified by patient as current problem  Cancer: Not identified by patient as current problem  UTI: Not identified by patient as current problem  Incontinence: Identified as current problem         Reviewed medical and social history with patient and/or caregiver. A complex care plan was discussed and completed today, with input from patient and/or caregiver.    Patient Instructions     Instructions were provided via the [employer] patient resources and are available for the patient to view on the patient portal, if active.      Todays OPCM Self-Management Care Plan was developed with the patients/caregivers input and was based on identified barriers from todays assessment.  Goals were written today with the patient/caregiver and the patient has agreed to work towards these goals to improve his/her overall well-being. Patient verbalized understanding of the care plan, goals, and all of today's instructions. Encouraged patient/caregiver to communicate with his/her physician and health care team about health conditions and the treatment plan.  Provided my contact information today and encouraged patient/caregiver to call me with any questions as needed.

## 2022-03-15 NOTE — PROGRESS NOTES
I have seen the patient, reviewed the Student's history and physical, assessment and plan. I have personally interviewed and examined the patient at bedside and: agree with the findings.     S/p attempted lysis of adhesions 3/4/22.  Today she complains of pain when she bends over.  She is on metamucil and mirilax and has diarrhea.  She also has gerd and has decreased ppi due to diarrhea.  She also has bloating and fatigue.    PE wound clear    Doing as well as expected after surgery.  Light duty one more month and follow up prn.  Consider getting of metamucil as it is a fiber compound.

## 2022-03-16 ENCOUNTER — PATIENT MESSAGE (OUTPATIENT)
Dept: HEMATOLOGY/ONCOLOGY | Facility: CLINIC | Age: 77
End: 2022-03-16
Payer: MEDICARE

## 2022-03-17 ENCOUNTER — OFFICE VISIT (OUTPATIENT)
Dept: GASTROENTEROLOGY | Facility: CLINIC | Age: 77
End: 2022-03-17
Payer: MEDICARE

## 2022-03-17 ENCOUNTER — LAB VISIT (OUTPATIENT)
Dept: LAB | Facility: HOSPITAL | Age: 77
End: 2022-03-17
Attending: INTERNAL MEDICINE
Payer: MEDICARE

## 2022-03-17 ENCOUNTER — TELEPHONE (OUTPATIENT)
Dept: INTERNAL MEDICINE | Facility: CLINIC | Age: 77
End: 2022-03-17
Payer: MEDICARE

## 2022-03-17 ENCOUNTER — CLINICAL SUPPORT (OUTPATIENT)
Dept: INTERNAL MEDICINE | Facility: CLINIC | Age: 77
End: 2022-03-17
Payer: MEDICARE

## 2022-03-17 VITALS
HEIGHT: 62 IN | WEIGHT: 132.81 LBS | DIASTOLIC BLOOD PRESSURE: 78 MMHG | HEART RATE: 67 BPM | BODY MASS INDEX: 24.44 KG/M2 | SYSTOLIC BLOOD PRESSURE: 123 MMHG

## 2022-03-17 DIAGNOSIS — K66.0 ABDOMINAL ADHESIONS: ICD-10-CM

## 2022-03-17 DIAGNOSIS — D50.9 IRON DEFICIENCY ANEMIA, UNSPECIFIED IRON DEFICIENCY ANEMIA TYPE: ICD-10-CM

## 2022-03-17 DIAGNOSIS — R30.0 DYSURIA: ICD-10-CM

## 2022-03-17 DIAGNOSIS — K66.0 ABDOMINAL ADHESIONS: Primary | ICD-10-CM

## 2022-03-17 DIAGNOSIS — Z87.19 HISTORY OF SMALL BOWEL OBSTRUCTION: ICD-10-CM

## 2022-03-17 DIAGNOSIS — R30.0 DYSURIA: Primary | ICD-10-CM

## 2022-03-17 DIAGNOSIS — C18.9 MALIGNANT NEOPLASM OF COLON, UNSPECIFIED PART OF COLON: Chronic | ICD-10-CM

## 2022-03-17 DIAGNOSIS — N30.01 ACUTE CYSTITIS WITH HEMATURIA: ICD-10-CM

## 2022-03-17 LAB
ANION GAP SERPL CALC-SCNC: 9 MMOL/L (ref 8–16)
BACTERIA #/AREA URNS AUTO: ABNORMAL /HPF
BASOPHILS # BLD AUTO: 0.04 K/UL (ref 0–0.2)
BASOPHILS NFR BLD: 0.5 % (ref 0–1.9)
BILIRUB UR QL STRIP: NEGATIVE
BUN SERPL-MCNC: 15 MG/DL (ref 8–23)
CALCIUM SERPL-MCNC: 9.9 MG/DL (ref 8.7–10.5)
CHLORIDE SERPL-SCNC: 104 MMOL/L (ref 95–110)
CLARITY UR REFRACT.AUTO: ABNORMAL
CO2 SERPL-SCNC: 27 MMOL/L (ref 23–29)
COLOR UR AUTO: YELLOW
CREAT SERPL-MCNC: 0.8 MG/DL (ref 0.5–1.4)
DIFFERENTIAL METHOD: ABNORMAL
EOSINOPHIL # BLD AUTO: 0.2 K/UL (ref 0–0.5)
EOSINOPHIL NFR BLD: 2.9 % (ref 0–8)
ERYTHROCYTE [DISTWIDTH] IN BLOOD BY AUTOMATED COUNT: 13.2 % (ref 11.5–14.5)
EST. GFR  (AFRICAN AMERICAN): >60 ML/MIN/1.73 M^2
EST. GFR  (NON AFRICAN AMERICAN): >60 ML/MIN/1.73 M^2
FERRITIN SERPL-MCNC: 276 NG/ML (ref 20–300)
GLUCOSE SERPL-MCNC: 138 MG/DL (ref 70–110)
GLUCOSE UR QL STRIP: NEGATIVE
HCT VFR BLD AUTO: 34.6 % (ref 37–48.5)
HGB BLD-MCNC: 11 G/DL (ref 12–16)
HGB UR QL STRIP: ABNORMAL
HYALINE CASTS UR QL AUTO: 0 /LPF
IMM GRANULOCYTES # BLD AUTO: 0.01 K/UL (ref 0–0.04)
IMM GRANULOCYTES NFR BLD AUTO: 0.1 % (ref 0–0.5)
IRON SERPL-MCNC: 58 UG/DL (ref 30–160)
KETONES UR QL STRIP: NEGATIVE
LEUKOCYTE ESTERASE UR QL STRIP: ABNORMAL
LIPASE SERPL-CCNC: 22 U/L (ref 4–60)
LYMPHOCYTES # BLD AUTO: 1.1 K/UL (ref 1–4.8)
LYMPHOCYTES NFR BLD: 14.5 % (ref 18–48)
MCH RBC QN AUTO: 32.6 PG (ref 27–31)
MCHC RBC AUTO-ENTMCNC: 31.8 G/DL (ref 32–36)
MCV RBC AUTO: 103 FL (ref 82–98)
MICROSCOPIC COMMENT: ABNORMAL
MONOCYTES # BLD AUTO: 0.4 K/UL (ref 0.3–1)
MONOCYTES NFR BLD: 5.7 % (ref 4–15)
NEUTROPHILS # BLD AUTO: 5.6 K/UL (ref 1.8–7.7)
NEUTROPHILS NFR BLD: 76.3 % (ref 38–73)
NITRITE UR QL STRIP: NEGATIVE
NRBC BLD-RTO: 0 /100 WBC
PH UR STRIP: 6 [PH] (ref 5–8)
PLATELET # BLD AUTO: 86 K/UL (ref 150–450)
PMV BLD AUTO: 11.4 FL (ref 9.2–12.9)
POTASSIUM SERPL-SCNC: 4.2 MMOL/L (ref 3.5–5.1)
PROT UR QL STRIP: ABNORMAL
RBC # BLD AUTO: 3.37 M/UL (ref 4–5.4)
RBC #/AREA URNS AUTO: >100 /HPF (ref 0–4)
SATURATED IRON: 14 % (ref 20–50)
SODIUM SERPL-SCNC: 140 MMOL/L (ref 136–145)
SP GR UR STRIP: 1.01 (ref 1–1.03)
TOTAL IRON BINDING CAPACITY: 416 UG/DL (ref 250–450)
TRANSFERRIN SERPL-MCNC: 281 MG/DL (ref 200–375)
URN SPEC COLLECT METH UR: ABNORMAL
WBC # BLD AUTO: 7.32 K/UL (ref 3.9–12.7)
WBC #/AREA URNS AUTO: >100 /HPF (ref 0–5)

## 2022-03-17 PROCEDURE — 99214 PR OFFICE/OUTPT VISIT, EST, LEVL IV, 30-39 MIN: ICD-10-PCS | Mod: S$PBB,,, | Performed by: INTERNAL MEDICINE

## 2022-03-17 PROCEDURE — 87088 URINE BACTERIA CULTURE: CPT | Performed by: INTERNAL MEDICINE

## 2022-03-17 PROCEDURE — 99213 OFFICE O/P EST LOW 20 MIN: CPT | Mod: PBBFAC | Performed by: INTERNAL MEDICINE

## 2022-03-17 PROCEDURE — 99214 OFFICE O/P EST MOD 30 MIN: CPT | Mod: S$PBB,,, | Performed by: INTERNAL MEDICINE

## 2022-03-17 PROCEDURE — 99999 PR PBB SHADOW E&M-EST. PATIENT-LVL III: CPT | Mod: PBBFAC,,, | Performed by: INTERNAL MEDICINE

## 2022-03-17 PROCEDURE — 99211 OFF/OP EST MAY X REQ PHY/QHP: CPT | Mod: PBBFAC

## 2022-03-17 PROCEDURE — 84466 ASSAY OF TRANSFERRIN: CPT | Performed by: INTERNAL MEDICINE

## 2022-03-17 PROCEDURE — 81001 URINALYSIS AUTO W/SCOPE: CPT | Performed by: INTERNAL MEDICINE

## 2022-03-17 PROCEDURE — 99999 PR PBB SHADOW E&M-EST. PATIENT-LVL I: ICD-10-PCS | Mod: PBBFAC,,,

## 2022-03-17 PROCEDURE — 85025 COMPLETE CBC W/AUTO DIFF WBC: CPT | Performed by: INTERNAL MEDICINE

## 2022-03-17 PROCEDURE — 99999 PR PBB SHADOW E&M-EST. PATIENT-LVL I: CPT | Mod: PBBFAC,,,

## 2022-03-17 PROCEDURE — 87186 SC STD MICRODIL/AGAR DIL: CPT | Performed by: INTERNAL MEDICINE

## 2022-03-17 PROCEDURE — 80048 BASIC METABOLIC PNL TOTAL CA: CPT | Performed by: INTERNAL MEDICINE

## 2022-03-17 PROCEDURE — 83690 ASSAY OF LIPASE: CPT | Performed by: INTERNAL MEDICINE

## 2022-03-17 PROCEDURE — 87077 CULTURE AEROBIC IDENTIFY: CPT | Performed by: INTERNAL MEDICINE

## 2022-03-17 PROCEDURE — 99999 PR PBB SHADOW E&M-EST. PATIENT-LVL III: ICD-10-PCS | Mod: PBBFAC,,, | Performed by: INTERNAL MEDICINE

## 2022-03-17 PROCEDURE — 87086 URINE CULTURE/COLONY COUNT: CPT | Performed by: INTERNAL MEDICINE

## 2022-03-17 PROCEDURE — 36415 COLL VENOUS BLD VENIPUNCTURE: CPT | Performed by: INTERNAL MEDICINE

## 2022-03-17 PROCEDURE — 82728 ASSAY OF FERRITIN: CPT | Performed by: INTERNAL MEDICINE

## 2022-03-17 RX ORDER — AMOXICILLIN AND CLAVULANATE POTASSIUM 500; 125 MG/1; MG/1
1 TABLET, FILM COATED ORAL 2 TIMES DAILY
Qty: 14 TABLET | Refills: 0 | Status: SHIPPED | OUTPATIENT
Start: 2022-03-17 | End: 2022-03-24

## 2022-03-17 RX ORDER — FERROUS SULFATE, DRIED 160(50) MG
1 TABLET, EXTENDED RELEASE ORAL DAILY
COMMUNITY

## 2022-03-17 NOTE — Clinical Note
Rodolfo can we have her return to GI clinic in 3 months not 6 months thank you Detail Level: Detailed Quality 110: Preventive Care And Screening: Influenza Immunization: Influenza Immunization previously received during influenza season Quality 111:Pneumonia Vaccination Status For Older Adults: Pneumococcal Vaccination Previously Received

## 2022-03-17 NOTE — PROGRESS NOTES
[facility] Consultation Note    Reason for Consult:  The primary encounter diagnosis was Abdominal adhesions. Diagnoses of History of small bowel obstruction and Iron deficiency anemia, unspecified iron deficiency anemia type were also pertinent to this visit.    PCP:   Darci Guthrie       Referring MD:  No referring provider defined for this encounter.    Initial History of Present Illness (HPI):  This is a 77 y.o. female here for follow-up evaluation after recent exploratory laparotomy for recurrent small-bowel obstructions.  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 Leonidas Barriga felt too risky to continue due to risk of viscous and solid organs due to blocked in bowel syndrome.  Patient has had a follow-up surgical evaluation she is doing well.  She knows that she continue to have intermittent small-bowel obstructions and she will have to medically manage him since she is not a surgical candidate due to her anatomy and postsurgical scar tissue.  Her daughters with her today they understand this she has a new primary care physician in [facility] medicine here at the [facility].  With a recent surgery and I think doing an EGD and colonoscopy right now would not be ideal maybe in the next several weeks to months for follow-up.  They have several questions that water to dress today and those were addressed in clinic.    Abdominal pain - much improved  Reflux - no  Dysphagia - no   Bowel habits - normal  GI bleeding - none  NSAID usage - none    Interval HPI 03/17/2022:  The patient's last visit with me was on 1/10/2022.      ROS:  Constitutional: No fevers, chills, No weight loss  ENT:  No heartburn no dysphagia no odynophagia no hoarseness  CV: No chest pain, no palpitation  Pulm: No cough, No shortness of breath, no wheezing  Ophtho: No vision changes  GI: see  HPI  Derm: No rash, no itching  Heme: No lymphadenopathy, No easy bruising  MSK:  Some arthritis  : No dysuria, No hematuria  Endo: No hot or cold intolerance  Neuro: No syncope, No seizure, no strokes  Psych: No uncontrolled anxiety, No uncontrolled depression    Medical History:  has a past medical history of Abdominal pain, Allergic rhinitis, Arthritis, Blood platelet disorder, Blood platelet disorder, Blood transfusion, Bowel obstruction, Cervical radiculopathy, Colon cancer (), Diabetes mellitus, Diarrhea, Falls (2020), Family history of breast cancer, Family history of colon cancer, Fatty liver, GERD (gastroesophageal reflux disease), History of shingles, Hyperlipidemia, Hypothyroidism, Irritable bowel syndrome, Microscopic colitis, Myelodysplastic syndrome, Raynaud phenomenon, Raynaud's disease, and Type 2 diabetes mellitus.    Surgical History:  has a past surgical history that includes Hysterectomy; Appendectomy; Toe Surgery; Tonsillectomy;  section; posterolateral fusion with autograft bone and Eun mineralized bone matrix (13); Carpal tunnel release; Trigger finger release; Back surgery; Cholecystectomy (); Colectomy (2011); Colonoscopy (N/A, 2017); Eye surgery; Esophagogastroduodenoscopy (N/A, 2018); Endoscopic ultrasound of upper gastrointestinal tract (N/A, 2018); Endoscopic ultrasound of upper gastrointestinal tract (N/A, 2019); Esophagogastroduodenoscopy (N/A, 2019); Colonoscopy (N/A, 2019); Fluoroscopic urodynamic study (N/A, 2021); and Cystoscopy (N/A, 2021).    Family History: family history includes Alcohol abuse in her brother and brother; Arthritis in her brother and daughter; Asthma in her daughter; Bladder Cancer in her mother; Breast cancer (age of onset: 79) in her sister; Cataracts in her mother; Colon cancer in her father; Colon cancer (age of onset: 70) in her brother; Depression in her daughter and daughter;  Glaucoma in her mother; Heart disease in her mother; Heart disease (age of onset: 50) in her father; Hyperlipidemia in her mother; Hypertension in her daughter; Kidney disease in her mother; Parkinsonism in her brother; Stroke (age of onset: 40) in her daughter..     Social History:  reports that she has never smoked. She has never used smokeless tobacco. She reports current alcohol use. She reports that she does not use drugs.    Review of patient's allergies indicates:   Allergen Reactions    Codeine Itching and Nausea And Vomiting    Dilaudid [hydromorphone (bulk)] Other (See Comments)     Oversedating, head burning. Pt prefers to avoid.       Percocet [oxycodone-acetaminophen] Itching    Sulfa (sulfonamide antibiotics) Itching and Nausea And Vomiting           Latex, natural rubber Rash       Medication List with Changes/Refills   Current Medications    ACETAMINOPHEN (TYLENOL) 650 MG TBSR    Take 650 mg by mouth once as needed (pain).    ASCORBIC ACID, VITAMIN C, (VITAMIN C) 1000 MG TABLET    Take 1,000 mg by mouth once daily.    ATORVASTATIN (LIPITOR) 40 MG TABLET    TAKE 1 TABLET BY MOUTH  DAILY    AZELASTINE (ASTELIN) 137 MCG (0.1 %) NASAL SPRAY    2 sprays (274 mcg total) by Nasal route 2 (two) times daily as needed for Rhinitis.    BIOTIN 10,000 MCG TBDL    Take 1 tablet by mouth once daily.     CALCIUM-VITAMIN D3 (CALCIUM 500 + D) 500 MG-5 MCG (200 UNIT) PER TABLET    Take 1,200 tablets by mouth once daily.    CO-ENZYME Q-10 30 MG CAPSULE    Take 30 mg by mouth 3 (three) times daily.    CONJUGATED ESTROGENS (PREMARIN) VAGINAL CREAM    INSERT 1/2 GRAM VAGINALLY  TWICE WEEKLY    CRANBERRY EXTRACT 200 MG CAP    Take 1 capsule by mouth once daily.    D-MANNOSE ORAL    Take 1 tablet by mouth once daily.     DULOXETINE (CYMBALTA) 30 MG CAPSULE    Take 1 capsule (30 mg total) by mouth 2 (two) times daily.    FLASH GLUCOSE SENSOR (FREESTYLE EFREN 14 DAY SENSOR) KIT    1 application by Misc.(Non-Drug; Combo  "Route) route every 14 (fourteen) days. Disp 30 or 90 day refill    FLUTICASONE PROPIONATE (FLONASE) 50 MCG/ACTUATION NASAL SPRAY    1 spray by Each Nostril route daily as needed for Rhinitis.    FREESTYLE EFREN 10 DAY READER MISC    TEST as directed    FREESTYLE LITE STRIPS STRP    TEST DAILY AS DIRECTED    GABAPENTIN (NEURONTIN) 600 MG TABLET    Take 1 tablet (600 mg total) by mouth 2 (two) times daily.    HYDROCORTISONE 2.5 % CREAM    Apply topically 2 (two) times daily as needed.    HYOSCYAMINE (ANASPAZ,LEVSIN) 0.125 MG TAB    Take 1 tablet (125 mcg total) by mouth every 8 (eight) hours as needed (urgency).    LEVOTHYROXINE (SYNTHROID) 75 MCG TABLET    Take 1 tablet (75 mcg total) by mouth before breakfast.    LIDOCAINE (LIDODERM) 5 %    Place 1 patch onto the skin once daily. Remove & Discard patch within 12 hours or as directed by MD    MAGNESIUM 250 MG TAB    Take 1,000 mg by mouth once daily.    METFORMIN (GLUCOPHAGE) 500 MG TABLET    Take 500 mg by mouth 2 (two) times daily with meals.    MONTELUKAST (SINGULAIR) 10 MG TABLET    TAKE 1 TABLET BY MOUTH  DAILY    ONDANSETRON (ZOFRAN-ODT) 8 MG TBDL    Take 1 tablet (8 mg total) by mouth every 8 (eight) hours as needed (nausea).    OXYCODONE (ROXICODONE) 5 MG IMMEDIATE RELEASE TABLET    Take 1 tablet (5 mg total) by mouth every 6 (six) hours as needed for Pain.    PANTOPRAZOLE (PROTONIX) 20 MG TABLET    TAKE 1 TABLET(20 MG) BY MOUTH EVERY DAY    POLYETHYLENE GLYCOL (GLYCOLAX) 17 GRAM/DOSE POWDER    Take 17 g by mouth before dinner.    SENNA-DOCUSATE 8.6-50 MG (PERICOLACE) 8.6-50 MG PER TABLET    Take 1 tablet by mouth 2 (two) times daily as needed for Constipation.    VITAMIN D 1000 UNITS TAB    Take 1,000 Units by mouth once daily. D3         Objective Findings:    Vital Signs:  /78 (BP Location: Right arm, Patient Position: Sitting, BP Method: Medium (Automatic))   Pulse 67   Ht 5' 2" (1.575 m)   Wt 60.2 kg (132 lb 12.8 oz)   LMP  (LMP Unknown)   BMI " 24.29 kg/m²   Body mass index is 24.29 kg/m².    Physical Exam:  General Appearance: Well appearing in no acute distress  Eyes:    No scleral icterus  ENT:  No lesions or masses   Lungs: CTA bilaterally, no wheezes, no rhonchi, no rales  Heart:  S1, S2 normal, no murmurs heard  Abdomen:  Non distended, soft, no guarding, no rebound, no tenderness, no appreciated ascites, no bruits, no hepatosplenomegaly,  No CVA tenderness, no appreciated hernias, no Plascencia sign no McBurney point tenderness well healing midline recent abdominal scar  Musculoskeletal:  No major joint deformities  Skin: No petechiae or rash on exposed skin areas  Neurologic:  Alert and oriented x4  Psychiatric:  Normal speech mentation and affect    Labs:  Lab Results   Component Value Date    WBC 4.96 03/05/2022    HGB 10.4 (L) 03/05/2022    HCT 32.3 (L) 03/05/2022    PLT 66 (L) 03/05/2022    CHOL 150 08/04/2020    TRIG 104 08/04/2020    HDL 71 08/04/2020    ALT 41 02/12/2022    AST 32 02/12/2022     03/05/2022    K 4.3 03/05/2022     03/05/2022    CREATININE 0.8 03/05/2022    BUN 13 03/05/2022    CO2 22 (L) 03/05/2022    TSH 1.131 01/24/2022    INR 1.1 02/11/2022    GLUF 148 (H) 08/15/2006    HGBA1C 6.9 (H) 01/24/2022           Medical Decision Making:  Total time with patient reviewing prior studies images notes and labs  Has been 30 minutes with greater than 50% of the time face-to-face room with patient and daughter today  Answering questions physical exam history and education  Lab work reviewed  Prior EGD colonoscopy images and path personally reviewed by myself  Recent CT scan images personally reviewed by myself  Surgical note from Dr. Leonidas Barriga reviewed  DATE OF PROCEDURE: 3/4/2022     PRE OP DIAGNOSIS: Adhesion of abdominal wall [K66.0]     POST OP DIAGNOSIS: Adhesion of abdominal wall [K66.0]     PROCEDURE: Procedure(s) (LRB):  LAPAROTOMY, EXPLORATORY Lysis of adhesion, (N/A)     Surgeon(s) and Role:     * Loenidas ABDI  MD Linus - Primary     * Kirsten Montgomery MD - Resident - Assisting     ANESTHESIA: General.      Procedure:     The patient was placed under general anesthesia and the abdomen prepped and draped in usual manner.  An upper midline incision was made with scalpel subcutaneous tissues were divided with the Bovie.  Fascia was divided with the Bovie.  Peritoneum was entered carefully with Metzenbaum site and we were able to open up the fascia for a length of approximately 6 cm and get underneath the fascia using sharp dissection.  There was quite a bit of bleeding from small blood vessels everywhere and in addition the entire abdomen seemed to be locked in.  In fact after 30 minutes of meticulous dissection we got almost no wear.  I felt that it was too risky to continue surgery as despite the fact that she has had multiple bowel obstructions then returns to the emergency room frequently if we were to attempt to do further surgery to try and delineate the bowel more the chance of causing a fistula would be extremely high.  The fascia was reapproximated with 1. PDS and the skin was reapproximated with 4-0 Monocryl.  Dermabond was placed on the incision.  Patient tolerated procedure well was brought to a room stable condition.  Sponge and needle counts were correct at the end the case.  Blood loss is minimal, complications none and pathology none.     This dictation was done with voice recognition software      Assessment:  1. Abdominal adhesions    2. History of small bowel obstruction    3. Iron deficiency anemia, unspecified iron deficiency anemia type         Recommendations:  1. 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 Patient knows the importance of medical management as a result going to a full liquid diet early if symptoms return of small-bowel obstruction  And coming to the emergency room appropriate time.  3. Lab work today  a patient's iron deficiency anemia will recommend EGD colonoscopy for follow-up  4. Return to GI clinic 3 months for follow-up to discuss timing of follow-up surveillance colonoscopy in to give her enough time to heal from her recent surgery        Order summary:  Orders Placed This Encounter    CBC Auto Differential    Ferritin    Iron and TIBC    Basic Metabolic Panel    Lipase         Thank you so much for allowing me to participate in the care of Anayeli Jefferson MD

## 2022-03-17 NOTE — TELEPHONE ENCOUNTER
New onset dysuria. Recently treated for UTI and also had attempted exploratory laparotomy for recurrent SBO's. Checking UA with culture reflex.    UA positive: Last culture grew sensitive Klebsiella. Will start 1 wk course Augmentin and f/u culture results when available.

## 2022-03-17 NOTE — PROGRESS NOTES
Rodolfo please tell Anayeli that her anemia is slightly improved but she is slightly iron deficient please ask her if she would like me to set her up for an EGD colonoscopy next available for evaluation but to give her enough time for her recent surgery to he if so please let me know

## 2022-03-18 ENCOUNTER — TELEPHONE (OUTPATIENT)
Dept: GASTROENTEROLOGY | Facility: CLINIC | Age: 77
End: 2022-03-18
Payer: MEDICARE

## 2022-03-18 NOTE — TELEPHONE ENCOUNTER
Dr. Jefferson, please place order.     Contact and spoke with Mrs. Judd, Patient decided to go with the EGD/Colonoscopy. Patient stated that it will have to be schedule after her 6 week recovery from surgery.

## 2022-03-18 NOTE — TELEPHONE ENCOUNTER
----- Message from Ramiro Jefferson MD sent at 3/17/2022  5:53 PM CDT -----  Rodolfo please tell June that her anemia is slightly improved but she is slightly iron deficient please ask her if she would like me to set her up for an EGD colonoscopy next available for evaluation but to give her enough time for her recent surgery to he if so please let me know

## 2022-03-19 DIAGNOSIS — E61.1 IRON DEFICIENCY: Primary | ICD-10-CM

## 2022-03-19 DIAGNOSIS — Z80.0 FAMILY HISTORY OF COLON CANCER: ICD-10-CM

## 2022-03-19 DIAGNOSIS — Z85.038 HISTORY OF COLON CANCER: ICD-10-CM

## 2022-03-19 LAB — BACTERIA UR CULT: ABNORMAL

## 2022-03-20 NOTE — PROGRESS NOTES
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 Judd is a 77 y.o. female who is referred to us for thrombocytopenia and bone marrow biopsy suggestive of MDS.   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 She remained in remission since then. She also has history of multiple SBO s/p ex lap x2, and SBR.   She was noted to have thrombocytopenia on routine labs, and bone marrow biopsy was performed on 2020 revealing "mildly hypercellular marrow (30-40%) with trilineage hematopoiesis and mild dyserythropoiesis with increased ring sideroblasts (6%), Blasts in marrow were 0.7%  Chromosomal analysis revealed normal karyotype 46,XX with no evidence of acquired clonal abnormality.   Negative FISH results for -5/5q-, -7/7q-, +8, and -20/20q-    NGS reported normal.   She was seen on initial consult visit on 2020, and giving her  R-IPSS score was 1.5 and risk category of very low. She is placed under surveillance every 3 moths.     Interval history:    She was last seen in clinic on 10/26/2021. She had recurrent hospital admissions for SBO (more than 5 times). She underwent an exploratory laparotomy on 2022 and the entire abdomen seemed to be locked in.  It was deemed unsafe to proceed with the surgery.   She also saw neurology for mild cognitive impairment and was started on donepezil. She is with her daughter today. She feels overall well. Her main complaint is fatigue.       Past Medical History:   Past Medical History:   Diagnosis Date    Abdominal pain     Allergic rhinitis     Arthritis     Blood platelet disorder     Blood platelet disorder     Blood transfusion     during delivery and     Bowel obstruction     Cervical radiculopathy     " followed by dr reynolds    Colon cancer     transverse colon; resected; Stage IIA (pT3 pN0 MX)    Diabetes mellitus     Diarrhea     Falls 2020    Family history of breast cancer     Family history of colon cancer     Fatty liver     GERD (gastroesophageal reflux disease)     History of shingles     Hyperlipidemia     Hypothyroidism     Irritable bowel syndrome     Microscopic colitis     treated     Myelodysplastic syndrome     Raynaud phenomenon     Raynaud's disease     Type 2 diabetes mellitus        Past Surgical HIstory:   Past Surgical History:   Procedure Laterality Date    APPENDECTOMY      BACK SURGERY      CARPAL TUNNEL RELEASE      bilateral      SECTION      CHOLECYSTECTOMY  1965    COLECTOMY  2011    Transverse colon resection by Dr. Aguirre    COLONOSCOPY N/A 2017    Procedure: COLONOSCOPY;  Surgeon: Manjit Alvarez MD;  Location: [facility] (4TH FLR);  Service: Endoscopy;  Laterality: N/A;    COLONOSCOPY N/A 2019    Procedure: COLONOSCOPY;  Surgeon: Ramiro Jefferson MD;  Location: [facility] (4TH FLR);  Service: Endoscopy;  Laterality: N/A;  PM prep    CYSTOSCOPY N/A 2021    Procedure: CYSTOSCOPY;  Surgeon: Viridiana Valenzuela MD;  Location: [facility] 1ST FLR;  Service: Urology;  Laterality: N/A;    ENDOSCOPIC ULTRASOUND OF UPPER GASTROINTESTINAL TRACT N/A 2018    Procedure: ULTRASOUND, UPPER GI TRACT, ENDOSCOPIC;  Surgeon: Jose Hess MD;  Location: [facility];  Service: Endoscopy;  Laterality: N/A;    ENDOSCOPIC ULTRASOUND OF UPPER GASTROINTESTINAL TRACT N/A 2019    Procedure: ULTRASOUND, UPPER GI TRACT, ENDOSCOPIC;  Surgeon: Jose Hess MD;  Location: [facility] (2ND FLR);  Service: Endoscopy;  Laterality: N/A;    ESOPHAGOGASTRODUODENOSCOPY N/A 2018    Procedure: EGD (ESOPHAGOGASTRODUODENOSCOPY);  Surgeon: Angelo Reynolds MD;  Location: [facility] (2ND FLR);  Service: Endoscopy;  Laterality: N/A;     ESOPHAGOGASTRODUODENOSCOPY N/A 6/26/2019    Procedure: EGD (ESOPHAGOGASTRODUODENOSCOPY);  Surgeon: Ramiro Jefferson MD;  Location: [facility] (4TH FLR);  Service: Endoscopy;  Laterality: N/A;    EYE SURGERY      Cataract Removal    FLUOROSCOPIC URODYNAMIC STUDY N/A 11/9/2021    Procedure: URODYNAMIC STUDY, FLUOROSCOPIC;  Surgeon: Viridiana Valenzuela MD;  [address]  Service: Urology;  Laterality: N/A;  90 minutes     HYSTERECTOMY      posterolateral fusion with autograft bone and Eun mineralized bone matrix  2/1/13    at [facility] for lumbar spine stenosis    TOE SURGERY      TONSILLECTOMY      TRIGGER FINGER RELEASE         Family History:   Family History   Problem Relation Age of Onset    Heart disease Father 50        Mi age 50    Colon cancer Father     Bladder Cancer Mother         non smoker    Cataracts Mother     Glaucoma Mother     Heart disease Mother     Hyperlipidemia Mother     Kidney disease Mother     Breast cancer Sister 79    Arthritis Daughter     Asthma Daughter     Depression Daughter     Hypertension Daughter     Stroke Daughter 40    Arthritis Brother     Colon cancer Brother 70    Alcohol abuse Brother     Parkinsonism Brother     Alcohol abuse Brother     Depression Daughter     Celiac disease Neg Hx     Cirrhosis Neg Hx     Colon polyps Neg Hx     Crohn's disease Neg Hx     Cystic fibrosis Neg Hx     Esophageal cancer Neg Hx     Hemochromatosis Neg Hx     Inflammatory bowel disease Neg Hx     Irritable bowel syndrome Neg Hx     Liver cancer Neg Hx     Liver disease Neg Hx     Rectal cancer Neg Hx     Stomach cancer Neg Hx     Ulcerative colitis Neg Hx     Eliazar's disease Neg Hx     Amblyopia Neg Hx     Blindness Neg Hx     Macular degeneration Neg Hx     Retinal detachment Neg Hx     Strabismus Neg Hx     Melanoma Neg Hx        Social History:  reports that she has never smoked. She has never used smokeless tobacco. She reports  current alcohol use. She reports that she does not use drugs.    Allergies:  Review of patient's allergies indicates:   Allergen Reactions    Codeine Itching and Nausea And Vomiting    Dilaudid [hydromorphone (bulk)] Other (See Comments)     Oversedating, head burning. Pt prefers to avoid.       Percocet [oxycodone-acetaminophen] Itching    Sulfa (sulfonamide antibiotics) Itching and Nausea And Vomiting           Latex, natural rubber Rash       Medications:  Current Outpatient Medications   Medication Sig Dispense Refill    amoxicillin-clavulanate 500-125mg (AUGMENTIN) 500-125 mg Tab Take 1 tablet (500 mg total) by mouth 2 (two) times daily. for 7 days 14 tablet 0    ascorbic acid, vitamin C, (VITAMIN C) 1000 MG tablet Take 1,000 mg by mouth once daily.      atorvastatin (LIPITOR) 40 MG tablet TAKE 1 TABLET BY MOUTH  DAILY 90 tablet 3    biotin 10,000 mcg TbDL Take 1 tablet by mouth once daily.       calcium-vitamin D3 (OS-KASSANDRA 500 + D3) 500 mg-5 mcg (200 unit) per tablet Take 1,200 tablets by mouth once daily.      conjugated estrogens (PREMARIN) vaginal cream INSERT 1/2 GRAM VAGINALLY  TWICE WEEKLY 30 g 3    cranberry extract 200 mg Cap Take 1 capsule by mouth once daily.      D-MANNOSE ORAL Take 1 tablet by mouth once daily.       DULoxetine (CYMBALTA) 30 MG capsule Take 1 capsule (30 mg total) by mouth 2 (two) times daily. 180 capsule 3    flash glucose sensor (FREESTYLE EFREN 14 DAY SENSOR) Kit 1 application by Misc.(Non-Drug; Combo Route) route every 14 (fourteen) days. Disp 30 or 90 day refill 6 kit 6    FREESTYLE EFREN 10 DAY READER [facility] TEST as directed 3 each 3    FREESTYLE LITE STRIPS Strp TEST DAILY AS DIRECTED 50 strip 2    gabapentin (NEURONTIN) 600 MG tablet Take 1 tablet (600 mg total) by mouth 2 (two) times daily. 180 tablet 3    levothyroxine (SYNTHROID) 75 MCG tablet Take 1 tablet (75 mcg total) by mouth before breakfast. 90 tablet 3    magnesium 250 mg Tab Take 1,000 mg by  mouth once daily.      metFORMIN (GLUCOPHAGE) 500 MG tablet Take 500 mg by mouth 2 (two) times daily with meals.      montelukast (SINGULAIR) 10 mg tablet TAKE 1 TABLET BY MOUTH  DAILY 90 tablet 3    pantoprazole (PROTONIX) 20 MG tablet TAKE 1 TABLET(20 MG) BY MOUTH EVERY DAY 30 tablet 2    acetaminophen (TYLENOL) 650 MG TbSR Take 650 mg by mouth once as needed (pain).      azelastine (ASTELIN) 137 mcg (0.1 %) nasal spray 2 sprays (274 mcg total) by Nasal route 2 (two) times daily as needed for Rhinitis. (Patient not taking: Reported on 3/21/2022)      co-enzyme Q-10 30 mg capsule Take 30 mg by mouth 3 (three) times daily.      fluticasone propionate (FLONASE) 50 mcg/actuation nasal spray 1 spray by Each Nostril route daily as needed for Rhinitis.      hydrocortisone 2.5 % cream Apply topically 2 (two) times daily as needed. (Patient not taking: Reported on 3/21/2022) 1 each 1    hyoscyamine (ANASPAZ,LEVSIN) 0.125 mg Tab Take 1 tablet (125 mcg total) by mouth every 8 (eight) hours as needed (urgency). (Patient not taking: Reported on 3/21/2022) 90 tablet 3    LIDOcaine (LIDODERM) 5 % Place 1 patch onto the skin once daily. Remove & Discard patch within 12 hours or as directed by MD (Patient not taking: Reported on 3/21/2022) 30 patch 0    ondansetron (ZOFRAN-ODT) 8 MG TbDL Take 1 tablet (8 mg total) by mouth every 8 (eight) hours as needed (nausea). (Patient not taking: Reported on 3/21/2022) 30 tablet 0    oxyCODONE (ROXICODONE) 5 MG immediate release tablet Take 1 tablet (5 mg total) by mouth every 6 (six) hours as needed for Pain. (Patient not taking: No sig reported) 20 tablet 0    polyethylene glycol (GLYCOLAX) 17 gram/dose powder Take 17 g by mouth before dinner. (Patient not taking: Reported on 3/21/2022) 1530 g 1    polyethylene glycol (GOLYTELY,NULYTELY) 236-22.74-6.74 -5.86 gram suspension Take 8,000 mLs (8 L total) by mouth once. for 1 dose (Patient not taking: Reported on 3/21/2022) 8000 mL  "0    senna-docusate 8.6-50 mg (PERICOLACE) 8.6-50 mg per tablet Take 1 tablet by mouth 2 (two) times daily as needed for Constipation. (Patient not taking: Reported on 3/21/2022)      vitamin D 1000 units Tab Take 1,000 Units by mouth once daily. D3       No current facility-administered medications for this visit.       Review of Systems   Constitutional: Positive for activity change and fatigue. Negative for appetite change, chills, fever and unexpected weight change.   HENT: Negative for sinus pressure, sinus pain, tinnitus and trouble swallowing.    Eyes: Negative.    Respiratory: Negative for cough, shortness of breath, wheezing and stridor.    Cardiovascular: Negative for chest pain, palpitations and leg swelling.   Gastrointestinal: Negative for abdominal pain, anal bleeding, blood in stool, constipation and diarrhea.   Endocrine: Negative.    Genitourinary: Negative for urgency and vaginal pain.   Musculoskeletal: Negative for back pain, myalgias and neck stiffness.   Skin: Negative.    Allergic/Immunologic: Negative.    Neurological: Negative for syncope, speech difficulty and headaches.   Hematological: Negative.        ECOG Performance Status: 2   Objective:      Vitals:   Vitals:    03/21/22 1304   BP: 108/60   BP Location: Right arm   Patient Position: Sitting   BP Method: Medium (Automatic)   Pulse: 72   Resp: 16   Temp: 98.1 °F (36.7 °C)   TempSrc: Oral   SpO2: 100%   Weight: 60.3 kg (132 lb 15 oz)   Height: 5' 2" (1.575 m)       Physical Exam  Constitutional:       General: She is not in acute distress.     Appearance: She is not ill-appearing.   HENT:      Head: Normocephalic and atraumatic.      Mouth/Throat:      Pharynx: Oropharynx is clear.   Eyes:      Pupils: Pupils are equal, round, and reactive to light.   Cardiovascular:      Rate and Rhythm: Normal rate and regular rhythm.      Heart sounds: No murmur heard.    No friction rub. No gallop.   Pulmonary:      Effort: Pulmonary effort is " normal. No respiratory distress.      Breath sounds: Normal breath sounds. No wheezing or rales.   Abdominal:      General: There is no distension.      Palpations: Abdomen is soft. There is no mass.      Tenderness: There is no abdominal tenderness.   Musculoskeletal:         General: No swelling.   Skin:     Coloration: Skin is not jaundiced or pale.      Findings: No rash.   Neurological:      General: No focal deficit present.      Mental Status: She is oriented to person, place, and time.         Laboratory Data:  Lab Visit on 03/21/2022   Component Date Value Ref Range Status    WBC 03/21/2022 4.86  3.90 - 12.70 K/uL Final    RBC 03/21/2022 3.66 (A) 4.00 - 5.40 M/uL Final    Hemoglobin 03/21/2022 11.7 (A) 12.0 - 16.0 g/dL Final    Hematocrit 03/21/2022 36.8 (A) 37.0 - 48.5 % Final    MCV 03/21/2022 101 (A) 82 - 98 fL Final    MCH 03/21/2022 32.0 (A) 27.0 - 31.0 pg Final    MCHC 03/21/2022 31.8 (A) 32.0 - 36.0 g/dL Final    RDW 03/21/2022 12.9  11.5 - 14.5 % Final    Platelets 03/21/2022 102 (A) 150 - 450 K/uL Final    MPV 03/21/2022 11.5  9.2 - 12.9 fL Final    Immature Granulocytes 03/21/2022 0.2  0.0 - 0.5 % Final    Gran # (ANC) 03/21/2022 2.7  1.8 - 7.7 K/uL Final    Immature Grans (Abs) 03/21/2022 0.01  0.00 - 0.04 K/uL Final    Comment: Mild elevation in immature granulocytes is non specific and   can be seen in a variety of conditions including stress response,   acute inflammation, trauma and pregnancy. Correlation with other   laboratory and clinical findings is essential.      Lymph # 03/21/2022 1.3  1.0 - 4.8 K/uL Final    Mono # 03/21/2022 0.5  0.3 - 1.0 K/uL Final    Eos # 03/21/2022 0.3  0.0 - 0.5 K/uL Final    Baso # 03/21/2022 0.04  0.00 - 0.20 K/uL Final    nRBC 03/21/2022 0  0 /100 WBC Final    Gran % 03/21/2022 55.8  38.0 - 73.0 % Final    Lymph % 03/21/2022 27.6  18.0 - 48.0 % Final    Mono % 03/21/2022 10.5  4.0 - 15.0 % Final    Eosinophil % 03/21/2022 5.1  0.0 - 8.0  % Final    Basophil % 03/21/2022 0.8  0.0 - 1.9 % Final    Differential Method 03/21/2022 Automated   Final    Sodium 03/21/2022 137  136 - 145 mmol/L Final    Potassium 03/21/2022 4.4  3.5 - 5.1 mmol/L Final    Chloride 03/21/2022 101  95 - 110 mmol/L Final    CO2 03/21/2022 26  23 - 29 mmol/L Final    Glucose 03/21/2022 204 (A) 70 - 110 mg/dL Final    BUN 03/21/2022 11  8 - 23 mg/dL Final    Creatinine 03/21/2022 0.9  0.5 - 1.4 mg/dL Final    Calcium 03/21/2022 10.0  8.7 - 10.5 mg/dL Final    Total Protein 03/21/2022 7.3  6.0 - 8.4 g/dL Final    Albumin 03/21/2022 3.8  3.5 - 5.2 g/dL Final    Total Bilirubin 03/21/2022 1.2 (A) 0.1 - 1.0 mg/dL Final    Comment: For infants and newborns, interpretation of results should be based  on gestational age, weight and in agreement with clinical  observations.    Premature Infant recommended reference ranges:  Up to 24 hours.............<8.0 mg/dL  Up to 48 hours............<12.0 mg/dL  3-5 days..................<15.0 mg/dL  6-29 days.................<15.0 mg/dL      Alkaline Phosphatase 03/21/2022 75  55 - 135 U/L Final    AST 03/21/2022 30  10 - 40 U/L Final    ALT 03/21/2022 28  10 - 44 U/L Final    Anion Gap 03/21/2022 10  8 - 16 mmol/L Final    eGFR if African American 03/21/2022 >60.0  >60 mL/min/1.73 m^2 Final    eGFR if non African American 03/21/2022 >60.0  >60 mL/min/1.73 m^2 Final    Comment: Calculation used to obtain the estimated glomerular filtration  rate (eGFR) is the CKD-EPI equation.      Lab Visit on 03/17/2022   Component Date Value Ref Range Status    WBC 03/17/2022 7.32  3.90 - 12.70 K/uL Final    RBC 03/17/2022 3.37 (A) 4.00 - 5.40 M/uL Final    Hemoglobin 03/17/2022 11.0 (A) 12.0 - 16.0 g/dL Final    Hematocrit 03/17/2022 34.6 (A) 37.0 - 48.5 % Final    MCV 03/17/2022 103 (A) 82 - 98 fL Final    MCH 03/17/2022 32.6 (A) 27.0 - 31.0 pg Final    MCHC 03/17/2022 31.8 (A) 32.0 - 36.0 g/dL Final    RDW 03/17/2022 13.2  11.5 -  14.5 % Final    Platelets 03/17/2022 86 (A) 150 - 450 K/uL Final    MPV 03/17/2022 11.4  9.2 - 12.9 fL Final    Immature Granulocytes 03/17/2022 0.1  0.0 - 0.5 % Final    Gran # (ANC) 03/17/2022 5.6  1.8 - 7.7 K/uL Final    Immature Grans (Abs) 03/17/2022 0.01  0.00 - 0.04 K/uL Final    Comment: Mild elevation in immature granulocytes is non specific and   can be seen in a variety of conditions including stress response,   acute inflammation, trauma and pregnancy. Correlation with other   laboratory and clinical findings is essential.      Lymph # 03/17/2022 1.1  1.0 - 4.8 K/uL Final    Mono # 03/17/2022 0.4  0.3 - 1.0 K/uL Final    Eos # 03/17/2022 0.2  0.0 - 0.5 K/uL Final    Baso # 03/17/2022 0.04  0.00 - 0.20 K/uL Final    nRBC 03/17/2022 0  0 /100 WBC Final    Gran % 03/17/2022 76.3 (A) 38.0 - 73.0 % Final    Lymph % 03/17/2022 14.5 (A) 18.0 - 48.0 % Final    Mono % 03/17/2022 5.7  4.0 - 15.0 % Final    Eosinophil % 03/17/2022 2.9  0.0 - 8.0 % Final    Basophil % 03/17/2022 0.5  0.0 - 1.9 % Final    Differential Method 03/17/2022 Automated   Final    Ferritin 03/17/2022 276  20.0 - 300.0 ng/mL Final    Iron 03/17/2022 58  30 - 160 ug/dL Final    Transferrin 03/17/2022 281  200 - 375 mg/dL Final    TIBC 03/17/2022 416  250 - 450 ug/dL Final    Saturated Iron 03/17/2022 14 (A) 20 - 50 % Final    Sodium 03/17/2022 140  136 - 145 mmol/L Final    Potassium 03/17/2022 4.2  3.5 - 5.1 mmol/L Final    Chloride 03/17/2022 104  95 - 110 mmol/L Final    CO2 03/17/2022 27  23 - 29 mmol/L Final    Glucose 03/17/2022 138 (A) 70 - 110 mg/dL Final    BUN 03/17/2022 15  8 - 23 mg/dL Final    Creatinine 03/17/2022 0.8  0.5 - 1.4 mg/dL Final    Calcium 03/17/2022 9.9  8.7 - 10.5 mg/dL Final    Anion Gap 03/17/2022 9  8 - 16 mmol/L Final    eGFR if African American 03/17/2022 >60.0  >60 mL/min/1.73 m^2 Final    eGFR if non African American 03/17/2022 >60.0  >60 mL/min/1.73 m^2 Final    Comment:  Calculation used to obtain the estimated glomerular filtration  rate (eGFR) is the CKD-EPI equation.       Lipase 03/17/2022 22  4 - 60 U/L Final   Clinical Support on 03/17/2022   Component Date Value Ref Range Status    Specimen UA 03/17/2022 Urine, Unspecified   Final    Color, UA 03/17/2022 Yellow  Yellow, Straw, Vangie Final    Appearance, UA 03/17/2022 Cloudy (A) Clear Final    pH, UA 03/17/2022 6.0  5.0 - 8.0 Final    Specific Gravity, UA 03/17/2022 1.015  1.005 - 1.030 Final    Protein, UA 03/17/2022 2+ (A) Negative Final    Comment: Recommend a 24 hour urine protein or a urine   protein/creatinine ratio if globulin induced proteinuria is  clinically suspected.      Glucose, UA 03/17/2022 Negative  Negative Final    Ketones, UA 03/17/2022 Negative  Negative Final    Bilirubin (UA) 03/17/2022 Negative  Negative Final    Occult Blood UA 03/17/2022 2+ (A) Negative Final    Nitrite, UA 03/17/2022 Negative  Negative Final    Leukocytes, UA 03/17/2022 2+ (A) Negative Final    RBC, UA 03/17/2022 >100 (A) 0 - 4 /hpf Final    WBC, UA 03/17/2022 >100 (A) 0 - 5 /hpf Final    Bacteria 03/17/2022 Many (A) None-Occ /hpf Final    Hyaline Casts, UA 03/17/2022 0  0-1/lpf /lpf Final    Microscopic Comment 03/17/2022 SEE COMMENT   Final    Comment: Other formed elements not mentioned in the report are not   present in the microscopic examination.       Urine Culture, Routine 03/17/2022  (A)  Final                    Value:KLEBSIELLA PNEUMONIAE  >100,000 cfu/ml       WBC   Date Value Ref Range Status   03/21/2022 4.86 3.90 - 12.70 K/uL Final     Hemoglobin   Date Value Ref Range Status   03/21/2022 11.7 (L) 12.0 - 16.0 g/dL Final     POC Hematocrit   Date Value Ref Range Status   01/25/2022 36 36 - 54 %PCV Final     Hematocrit   Date Value Ref Range Status   03/21/2022 36.8 (L) 37.0 - 48.5 % Final     Platelets   Date Value Ref Range Status   03/21/2022 102 (L) 150 - 450 K/uL Final     Gran # (ANC)   Date  Value Ref Range Status   03/21/2022 2.7 1.8 - 7.7 K/uL Final     Gran %   Date Value Ref Range Status   03/21/2022 55.8 38.0 - 73.0 % Final     Sodium   Date Value Ref Range Status   03/21/2022 137 136 - 145 mmol/L Final     Potassium   Date Value Ref Range Status   03/21/2022 4.4 3.5 - 5.1 mmol/L Final     BUN   Date Value Ref Range Status   03/21/2022 11 8 - 23 mg/dL Final           Assessment and plan        1. Myelodysplastic syndrome    2. Iron deficiency      Mrs Judd initially presented with thrombocytopenia, which on reviewing her chart has been since 2017, her counts were between . She also had periods with anemia, mostly normocytic (except in the last 3 months; macrocytic).  She underwent a bone marrow biopsy which showed hypercellular marrow with mild dyserythropoiesis and increased ring sideroblasts (6%) her marrow blasts count is 0.7%. She had normal karyotype and negative MDS-FISH, She was diagnosed with MDS-SLD. Her R-IPSS score at diagnosis was 1.5 and risk category of very low.     Her counts fluctuated during the last few months, but most recently Hgb is 11 g.dl. PLT 86, with WBC of 7.32, and ANC 5.6. Her R-IPSS score remains 1.5 with very low risk of progression.     Suspect the fluctuation of her levels are secondary to acute illness when she had episodes of SBO.     Will continue surveillance every three months with CBC.     2.   Recent labs with normal iron, normal ferritin but TSAT 15%.   Suspect mild iron def from blood loss during surgery   PO iron supplements 325 mg every other day.     Their questions were answered to their satisfaction.     The above was staffed and discussed with Dr. Madison Cabezas MD  [id number]  Hematology/Oncology fellow

## 2022-03-21 ENCOUNTER — OFFICE VISIT (OUTPATIENT)
Dept: INTERNAL MEDICINE | Facility: CLINIC | Age: 77
End: 2022-03-21
Payer: MEDICARE

## 2022-03-21 ENCOUNTER — TELEPHONE (OUTPATIENT)
Dept: ENDOSCOPY | Facility: HOSPITAL | Age: 77
End: 2022-03-21
Payer: MEDICARE

## 2022-03-21 ENCOUNTER — OFFICE VISIT (OUTPATIENT)
Dept: HEMATOLOGY/ONCOLOGY | Facility: CLINIC | Age: 77
End: 2022-03-21
Payer: MEDICARE

## 2022-03-21 ENCOUNTER — PATIENT MESSAGE (OUTPATIENT)
Dept: ENDOSCOPY | Facility: HOSPITAL | Age: 77
End: 2022-03-21
Payer: MEDICARE

## 2022-03-21 ENCOUNTER — LAB VISIT (OUTPATIENT)
Dept: LAB | Facility: HOSPITAL | Age: 77
End: 2022-03-21
Payer: MEDICARE

## 2022-03-21 VITALS
WEIGHT: 132.94 LBS | DIASTOLIC BLOOD PRESSURE: 60 MMHG | RESPIRATION RATE: 16 BRPM | BODY MASS INDEX: 24.46 KG/M2 | TEMPERATURE: 98 F | OXYGEN SATURATION: 100 % | SYSTOLIC BLOOD PRESSURE: 108 MMHG | HEIGHT: 62 IN | HEART RATE: 72 BPM

## 2022-03-21 VITALS
DIASTOLIC BLOOD PRESSURE: 69 MMHG | BODY MASS INDEX: 24.14 KG/M2 | WEIGHT: 132 LBS | HEART RATE: 72 BPM | TEMPERATURE: 98 F | SYSTOLIC BLOOD PRESSURE: 124 MMHG

## 2022-03-21 DIAGNOSIS — Z09 HOSPITAL DISCHARGE FOLLOW-UP: Primary | ICD-10-CM

## 2022-03-21 DIAGNOSIS — D46.Z MDS (MYELODYSPLASTIC SYNDROME), LOW GRADE: ICD-10-CM

## 2022-03-21 DIAGNOSIS — Z12.11 SCREENING FOR COLON CANCER: Primary | ICD-10-CM

## 2022-03-21 DIAGNOSIS — D46.9 MYELODYSPLASTIC SYNDROME: Primary | ICD-10-CM

## 2022-03-21 DIAGNOSIS — E61.1 IRON DEFICIENCY: ICD-10-CM

## 2022-03-21 DIAGNOSIS — E11.9 TYPE 2 DIABETES MELLITUS WITHOUT COMPLICATION, WITHOUT LONG-TERM CURRENT USE OF INSULIN: ICD-10-CM

## 2022-03-21 DIAGNOSIS — M54.50 LUMBAR BACK PAIN: ICD-10-CM

## 2022-03-21 DIAGNOSIS — K21.9 GASTROESOPHAGEAL REFLUX DISEASE, UNSPECIFIED WHETHER ESOPHAGITIS PRESENT: ICD-10-CM

## 2022-03-21 LAB
ALBUMIN SERPL BCP-MCNC: 3.8 G/DL (ref 3.5–5.2)
ALP SERPL-CCNC: 75 U/L (ref 55–135)
ALT SERPL W/O P-5'-P-CCNC: 28 U/L (ref 10–44)
ANION GAP SERPL CALC-SCNC: 10 MMOL/L (ref 8–16)
AST SERPL-CCNC: 30 U/L (ref 10–40)
BASOPHILS # BLD AUTO: 0.04 K/UL (ref 0–0.2)
BASOPHILS NFR BLD: 0.8 % (ref 0–1.9)
BILIRUB SERPL-MCNC: 1.2 MG/DL (ref 0.1–1)
BUN SERPL-MCNC: 11 MG/DL (ref 8–23)
CALCIUM SERPL-MCNC: 10 MG/DL (ref 8.7–10.5)
CHLORIDE SERPL-SCNC: 101 MMOL/L (ref 95–110)
CO2 SERPL-SCNC: 26 MMOL/L (ref 23–29)
CREAT SERPL-MCNC: 0.9 MG/DL (ref 0.5–1.4)
DIFFERENTIAL METHOD: ABNORMAL
EOSINOPHIL # BLD AUTO: 0.3 K/UL (ref 0–0.5)
EOSINOPHIL NFR BLD: 5.1 % (ref 0–8)
ERYTHROCYTE [DISTWIDTH] IN BLOOD BY AUTOMATED COUNT: 12.9 % (ref 11.5–14.5)
EST. GFR  (AFRICAN AMERICAN): >60 ML/MIN/1.73 M^2
EST. GFR  (NON AFRICAN AMERICAN): >60 ML/MIN/1.73 M^2
GLUCOSE SERPL-MCNC: 204 MG/DL (ref 70–110)
HCT VFR BLD AUTO: 36.8 % (ref 37–48.5)
HGB BLD-MCNC: 11.7 G/DL (ref 12–16)
IMM GRANULOCYTES # BLD AUTO: 0.01 K/UL (ref 0–0.04)
IMM GRANULOCYTES NFR BLD AUTO: 0.2 % (ref 0–0.5)
LYMPHOCYTES # BLD AUTO: 1.3 K/UL (ref 1–4.8)
LYMPHOCYTES NFR BLD: 27.6 % (ref 18–48)
MCH RBC QN AUTO: 32 PG (ref 27–31)
MCHC RBC AUTO-ENTMCNC: 31.8 G/DL (ref 32–36)
MCV RBC AUTO: 101 FL (ref 82–98)
MONOCYTES # BLD AUTO: 0.5 K/UL (ref 0.3–1)
MONOCYTES NFR BLD: 10.5 % (ref 4–15)
NEUTROPHILS # BLD AUTO: 2.7 K/UL (ref 1.8–7.7)
NEUTROPHILS NFR BLD: 55.8 % (ref 38–73)
NRBC BLD-RTO: 0 /100 WBC
PLATELET # BLD AUTO: 102 K/UL (ref 150–450)
PMV BLD AUTO: 11.5 FL (ref 9.2–12.9)
POTASSIUM SERPL-SCNC: 4.4 MMOL/L (ref 3.5–5.1)
PROT SERPL-MCNC: 7.3 G/DL (ref 6–8.4)
RBC # BLD AUTO: 3.66 M/UL (ref 4–5.4)
SODIUM SERPL-SCNC: 137 MMOL/L (ref 136–145)
WBC # BLD AUTO: 4.86 K/UL (ref 3.9–12.7)

## 2022-03-21 PROCEDURE — 99999 PR PBB SHADOW E&M-EST. PATIENT-LVL V: CPT | Mod: PBBFAC,,, | Performed by: INTERNAL MEDICINE

## 2022-03-21 PROCEDURE — 99214 PR OFFICE/OUTPT VISIT, EST, LEVL IV, 30-39 MIN: ICD-10-PCS | Mod: S$PBB,,, | Performed by: INTERNAL MEDICINE

## 2022-03-21 PROCEDURE — 99999 PR PBB SHADOW E&M-EST. PATIENT-LVL V: ICD-10-PCS | Mod: PBBFAC,GC,, | Performed by: STUDENT IN AN ORGANIZED HEALTH CARE EDUCATION/TRAINING PROGRAM

## 2022-03-21 PROCEDURE — 36415 COLL VENOUS BLD VENIPUNCTURE: CPT | Performed by: STUDENT IN AN ORGANIZED HEALTH CARE EDUCATION/TRAINING PROGRAM

## 2022-03-21 PROCEDURE — 99215 OFFICE O/P EST HI 40 MIN: CPT | Mod: S$PBB,GC,, | Performed by: STUDENT IN AN ORGANIZED HEALTH CARE EDUCATION/TRAINING PROGRAM

## 2022-03-21 PROCEDURE — 99215 OFFICE O/P EST HI 40 MIN: CPT | Mod: PBBFAC,27 | Performed by: INTERNAL MEDICINE

## 2022-03-21 PROCEDURE — 99999 PR PBB SHADOW E&M-EST. PATIENT-LVL V: ICD-10-PCS | Mod: PBBFAC,,, | Performed by: INTERNAL MEDICINE

## 2022-03-21 PROCEDURE — 80053 COMPREHEN METABOLIC PANEL: CPT | Performed by: STUDENT IN AN ORGANIZED HEALTH CARE EDUCATION/TRAINING PROGRAM

## 2022-03-21 PROCEDURE — 99215 PR OFFICE/OUTPT VISIT, EST, LEVL V, 40-54 MIN: ICD-10-PCS | Mod: S$PBB,GC,, | Performed by: STUDENT IN AN ORGANIZED HEALTH CARE EDUCATION/TRAINING PROGRAM

## 2022-03-21 PROCEDURE — 99215 OFFICE O/P EST HI 40 MIN: CPT | Mod: PBBFAC | Performed by: STUDENT IN AN ORGANIZED HEALTH CARE EDUCATION/TRAINING PROGRAM

## 2022-03-21 PROCEDURE — 99214 OFFICE O/P EST MOD 30 MIN: CPT | Mod: S$PBB,,, | Performed by: INTERNAL MEDICINE

## 2022-03-21 PROCEDURE — 85025 COMPLETE CBC W/AUTO DIFF WBC: CPT | Performed by: STUDENT IN AN ORGANIZED HEALTH CARE EDUCATION/TRAINING PROGRAM

## 2022-03-21 PROCEDURE — 99999 PR PBB SHADOW E&M-EST. PATIENT-LVL V: CPT | Mod: PBBFAC,GC,, | Performed by: STUDENT IN AN ORGANIZED HEALTH CARE EDUCATION/TRAINING PROGRAM

## 2022-03-21 RX ORDER — POLYETHYLENE GLYCOL 3350, SODIUM SULFATE ANHYDROUS, SODIUM BICARBONATE, SODIUM CHLORIDE, POTASSIUM CHLORIDE 236; 22.74; 6.74; 5.86; 2.97 G/4L; G/4L; G/4L; G/4L; G/4L
8 POWDER, FOR SOLUTION ORAL ONCE
Qty: 8000 ML | Refills: 0 | Status: SHIPPED | OUTPATIENT
Start: 2022-03-21 | End: 2022-03-21

## 2022-03-21 RX ORDER — DICLOFENAC SODIUM 10 MG/G
2 GEL TOPICAL 4 TIMES DAILY PRN
Qty: 350 G | Refills: 2 | Status: SHIPPED | OUTPATIENT
Start: 2022-03-21 | End: 2022-08-04

## 2022-03-21 RX ORDER — FAMOTIDINE 20 MG/1
20 TABLET, FILM COATED ORAL 2 TIMES DAILY PRN
Qty: 60 TABLET | Refills: 2 | Status: SHIPPED | OUTPATIENT
Start: 2022-03-21 | End: 2022-09-23

## 2022-03-22 NOTE — PROGRESS NOTES
Subjective:       Patient ID: Anayeli Judd is a 77 y.o. female.    Chief Complaint: Hospital Follow Up      HPI:  Hospital f/u. Hx of recurrent SBO. Recently underwent attempted ex-lap for TIFFANY. Diffuse adhesions noted and further exploration subsequently aborted. No ER visits since. Appetite and po intake have been good. Started Augmentin a few days ago for dysuria. Culture shows sensitive Klebsiella. Pt notes mild symptoms still but improving. No n/v, though is noting some breakthrough reflux with certain foods (eg spicy). Is having some mild diarrhea on abx. Mid-line surgical incision has been healing well with no exudates or redness.    T2DM, on metformin 500 bid. Recently out of town and had a spike to 299, lot of sugar and carb content around that time. Otherwise bg levels have generally been below 200.    Notes chronic low back pain flairing recently, particularly at night. Hx of lumbar DJD. Had been on gabapentin 1200 bid but this was reduced to 600 bid at a previous hospital d/c. Notes chronic right foot pains intermittently, includes lateral and dorsal foot at times. Hx of surgery for right guzmán's neuroma in past. No numbness. No classic sciatica symptoms.    Review of Systems   Constitutional: Negative for fatigue and fever.   Respiratory: Negative for cough and shortness of breath.    Gastrointestinal: Positive for diarrhea. Negative for abdominal pain, nausea and vomiting.   Genitourinary: Positive for dysuria and frequency.   Musculoskeletal: Positive for arthralgias and back pain.   Skin: Negative for rash.       Past Medical History:   Diagnosis Date    Abdominal pain     Allergic rhinitis     Arthritis     Blood platelet disorder     Blood platelet disorder     Blood transfusion     during delivery and     Bowel obstruction     Cervical radiculopathy     followed by dr cloud    Colon cancer     transverse colon; resected; Stage IIA (pT3 pN0 MX)    Diabetes mellitus      Diarrhea     Falls 01/2020    Family history of breast cancer     Family history of colon cancer     Fatty liver     GERD (gastroesophageal reflux disease)     History of shingles     Hyperlipidemia     Hypothyroidism     Irritable bowel syndrome     Microscopic colitis     treated 2013    Myelodysplastic syndrome     Raynaud phenomenon     Raynaud's disease     Type 2 diabetes mellitus          Current Outpatient Medications:     acetaminophen (TYLENOL) 650 MG TbSR, Take 650 mg by mouth once as needed (pain)., Disp: , Rfl:     amoxicillin-clavulanate 500-125mg (AUGMENTIN) 500-125 mg Tab, Take 1 tablet (500 mg total) by mouth 2 (two) times daily. for 7 days, Disp: 14 tablet, Rfl: 0    ascorbic acid, vitamin C, (VITAMIN C) 1000 MG tablet, Take 1,000 mg by mouth once daily., Disp: , Rfl:     atorvastatin (LIPITOR) 40 MG tablet, TAKE 1 TABLET BY MOUTH  DAILY, Disp: 90 tablet, Rfl: 3    azelastine (ASTELIN) 137 mcg (0.1 %) nasal spray, 2 sprays (274 mcg total) by Nasal route 2 (two) times daily as needed for Rhinitis., Disp: , Rfl:     biotin 10,000 mcg TbDL, Take 1 tablet by mouth once daily. , Disp: , Rfl:     calcium-vitamin D3 (OS-KASSANDRA 500 + D3) 500 mg-5 mcg (200 unit) per tablet, Take 1,200 tablets by mouth once daily., Disp: , Rfl:     co-enzyme Q-10 30 mg capsule, Take 30 mg by mouth 3 (three) times daily., Disp: , Rfl:     conjugated estrogens (PREMARIN) vaginal cream, INSERT 1/2 GRAM VAGINALLY  TWICE WEEKLY, Disp: 30 g, Rfl: 3    cranberry extract 200 mg Cap, Take 1 capsule by mouth once daily., Disp: , Rfl:     D-MANNOSE ORAL, Take 1 tablet by mouth once daily. , Disp: , Rfl:     DULoxetine (CYMBALTA) 30 MG capsule, Take 1 capsule (30 mg total) by mouth 2 (two) times daily., Disp: 180 capsule, Rfl: 3    flash glucose sensor (FREESTYLE EFREN 14 DAY SENSOR) Kit, 1 application by Misc.(Non-Drug; Combo Route) route every 14 (fourteen) days. Disp 30 or 90 day refill, Disp: 6 kit, Rfl: 6     fluticasone propionate (FLONASE) 50 mcg/actuation nasal spray, 1 spray by Each Nostril route daily as needed for Rhinitis., Disp: , Rfl:     FREESTYLE EFREN 10 DAY READER Misc, TEST as directed, Disp: 3 each, Rfl: 3    FREESTYLE LITE STRIPS Strp, TEST DAILY AS DIRECTED, Disp: 50 strip, Rfl: 2    gabapentin (NEURONTIN) 600 MG tablet, Take 1 tablet (600 mg total) by mouth 2 (two) times daily., Disp: 180 tablet, Rfl: 3    hydrocortisone 2.5 % cream, Apply topically 2 (two) times daily as needed., Disp: 1 each, Rfl: 1    hyoscyamine (ANASPAZ,LEVSIN) 0.125 mg Tab, Take 1 tablet (125 mcg total) by mouth every 8 (eight) hours as needed (urgency)., Disp: 90 tablet, Rfl: 3    levothyroxine (SYNTHROID) 75 MCG tablet, Take 1 tablet (75 mcg total) by mouth before breakfast., Disp: 90 tablet, Rfl: 3    LIDOcaine (LIDODERM) 5 %, Place 1 patch onto the skin once daily. Remove & Discard patch within 12 hours or as directed by MD, Disp: 30 patch, Rfl: 0    magnesium 250 mg Tab, Take 1,000 mg by mouth once daily., Disp: , Rfl:     metFORMIN (GLUCOPHAGE) 500 MG tablet, Take 500 mg by mouth 2 (two) times daily with meals., Disp: , Rfl:     montelukast (SINGULAIR) 10 mg tablet, TAKE 1 TABLET BY MOUTH  DAILY, Disp: 90 tablet, Rfl: 3    ondansetron (ZOFRAN-ODT) 8 MG TbDL, Take 1 tablet (8 mg total) by mouth every 8 (eight) hours as needed (nausea)., Disp: 30 tablet, Rfl: 0    oxyCODONE (ROXICODONE) 5 MG immediate release tablet, Take 1 tablet (5 mg total) by mouth every 6 (six) hours as needed for Pain., Disp: 20 tablet, Rfl: 0    pantoprazole (PROTONIX) 20 MG tablet, TAKE 1 TABLET(20 MG) BY MOUTH EVERY DAY, Disp: 30 tablet, Rfl: 2    polyethylene glycol (GLYCOLAX) 17 gram/dose powder, Take 17 g by mouth before dinner., Disp: 1530 g, Rfl: 1    senna-docusate 8.6-50 mg (PERICOLACE) 8.6-50 mg per tablet, Take 1 tablet by mouth 2 (two) times daily as needed for Constipation., Disp: , Rfl:     vitamin D 1000 units Tab, Take  1,000 Units by mouth once daily. D3, Disp: , Rfl:     diclofenac sodium (VOLTAREN) 1 % Gel, Apply 2 g topically 4 (four) times daily as needed (Back pain)., Disp: 350 g, Rfl: 2    famotidine (PEPCID) 20 MG tablet, Take 1 tablet (20 mg total) by mouth 2 (two) times daily as needed for Heartburn., Disp: 60 tablet, Rfl: 2    Past Surgical History:   Procedure Laterality Date    APPENDECTOMY      BACK SURGERY      CARPAL TUNNEL RELEASE      bilateral      SECTION      CHOLECYSTECTOMY  1965    COLECTOMY  2011    Transverse colon resection by Dr. Aguirre    COLONOSCOPY N/A 2017    Procedure: COLONOSCOPY;  Surgeon: Mnajit Alvarez MD;  Location: [facility] (4TH FLR);  Service: Endoscopy;  Laterality: N/A;    COLONOSCOPY N/A 2019    Procedure: COLONOSCOPY;  Surgeon: Ramiro Jefferson MD;  Location: [facility] (4TH FLR);  Service: Endoscopy;  Laterality: N/A;  PM prep    CYSTOSCOPY N/A 2021    Procedure: CYSTOSCOPY;  Surgeon: Viridiana Valenzuela MD;  [address]  Service: Urology;  Laterality: N/A;    ENDOSCOPIC ULTRASOUND OF UPPER GASTROINTESTINAL TRACT N/A 2018    Procedure: ULTRASOUND, UPPER GI TRACT, ENDOSCOPIC;  Surgeon: Jose Hess MD;  Location: [facility];  Service: Endoscopy;  Laterality: N/A;    ENDOSCOPIC ULTRASOUND OF UPPER GASTROINTESTINAL TRACT N/A 2019    Procedure: ULTRASOUND, UPPER GI TRACT, ENDOSCOPIC;  Surgeon: Jose Hess MD;  Location: [facility] (2ND FLR);  Service: Endoscopy;  Laterality: N/A;    ESOPHAGOGASTRODUODENOSCOPY N/A 2018    Procedure: EGD (ESOPHAGOGASTRODUODENOSCOPY);  Surgeon: Angelo Reynolds MD;  Location: [facility] (2ND FLR);  Service: Endoscopy;  Laterality: N/A;    ESOPHAGOGASTRODUODENOSCOPY N/A 2019    Procedure: EGD (ESOPHAGOGASTRODUODENOSCOPY);  Surgeon: Ramiro Jefferson MD;  Location: [facility] (4TH FLR);  Service: Endoscopy;  Laterality: N/A;    EYE SURGERY      Cataract Removal     FLUOROSCOPIC URODYNAMIC STUDY N/A 11/9/2021    Procedure: URODYNAMIC STUDY, FLUOROSCOPIC;  Surgeon: Viridiana Valenzuela MD;  Location: [facility] OR [address] [place] [address];  Service: Urology;  Laterality: N/A;  90 minutes     HYSTERECTOMY      posterolateral fusion with autograft bone and Eun mineralized bone matrix  2/1/13    at [facility] for lumbar spine stenosis    TOE SURGERY      TONSILLECTOMY      TRIGGER FINGER RELEASE         Social History     Tobacco Use    Smoking status: Never Smoker    Smokeless tobacco: Never Used   Substance Use Topics    Alcohol use: Yes     Comment: socially, hardly ever    Drug use: No         Objective:      Vitals:    03/21/22 1427   BP: 124/69   Pulse: 72   Temp: 98.2 °F (36.8 °C)       Physical Exam  Constitutional:       General: She is not in acute distress.     Appearance: Normal appearance. She is not ill-appearing.   Cardiovascular:      Rate and Rhythm: Normal rate and regular rhythm.   Pulmonary:      Effort: Pulmonary effort is normal. No respiratory distress.      Breath sounds: Normal breath sounds.   Abdominal:      General: Abdomen is flat. There is no distension.      Tenderness: There is no abdominal tenderness. There is no right CVA tenderness, left CVA tenderness, guarding or rebound.   Musculoskeletal:         General: Tenderness (Left lower back, mild. Left lateral ribs, mild. Left lateral foot along MTP.) present. No swelling. Normal range of motion.      Right lower leg: No edema.      Left lower leg: No edema.   Feet:      Right foot:      Skin integrity: Skin integrity normal.      Left foot:      Skin integrity: Skin integrity normal.      Comments: Monofilament testing intact b/l. Vibratory sensation reduced on left.  Skin:     Coloration: Skin is not pale.      Findings: Lesion (Well healing mid-line vertical surgical incision of abdomen. No surrounding erythema or exudates.) present. No rash.   Neurological:      General: No focal deficit present.      Mental Status:  She is alert and oriented to person, place, and time.   Psychiatric:         Mood and Affect: Mood normal.         Behavior: Behavior normal.         Thought Content: Thought content normal.         Judgment: Judgment normal.       Recent Results (from the past 72 hour(s))   CBC auto differential    Collection Time: 03/21/22 11:34 AM   Result Value Ref Range    WBC 4.86 3.90 - 12.70 K/uL    RBC 3.66 (L) 4.00 - 5.40 M/uL    Hemoglobin 11.7 (L) 12.0 - 16.0 g/dL    Hematocrit 36.8 (L) 37.0 - 48.5 %     (H) 82 - 98 fL    MCH 32.0 (H) 27.0 - 31.0 pg    MCHC 31.8 (L) 32.0 - 36.0 g/dL    RDW 12.9 11.5 - 14.5 %    Platelets 102 (L) 150 - 450 K/uL    MPV 11.5 9.2 - 12.9 fL    Immature Granulocytes 0.2 0.0 - 0.5 %    Gran # (ANC) 2.7 1.8 - 7.7 K/uL    Immature Grans (Abs) 0.01 0.00 - 0.04 K/uL    Lymph # 1.3 1.0 - 4.8 K/uL    Mono # 0.5 0.3 - 1.0 K/uL    Eos # 0.3 0.0 - 0.5 K/uL    Baso # 0.04 0.00 - 0.20 K/uL    nRBC 0 0 /100 WBC    Gran % 55.8 38.0 - 73.0 %    Lymph % 27.6 18.0 - 48.0 %    Mono % 10.5 4.0 - 15.0 %    Eosinophil % 5.1 0.0 - 8.0 %    Basophil % 0.8 0.0 - 1.9 %    Differential Method Automated    CMP    Collection Time: 03/21/22 11:34 AM   Result Value Ref Range    Sodium 137 136 - 145 mmol/L    Potassium 4.4 3.5 - 5.1 mmol/L    Chloride 101 95 - 110 mmol/L    CO2 26 23 - 29 mmol/L    Glucose 204 (H) 70 - 110 mg/dL    BUN 11 8 - 23 mg/dL    Creatinine 0.9 0.5 - 1.4 mg/dL    Calcium 10.0 8.7 - 10.5 mg/dL    Total Protein 7.3 6.0 - 8.4 g/dL    Albumin 3.8 3.5 - 5.2 g/dL    Total Bilirubin 1.2 (H) 0.1 - 1.0 mg/dL    Alkaline Phosphatase 75 55 - 135 U/L    AST 30 10 - 40 U/L    ALT 28 10 - 44 U/L    Anion Gap 10 8 - 16 mmol/L    eGFR if African American >60.0 >60 mL/min/1.73 m^2    eGFR if non African American >60.0 >60 mL/min/1.73 m^2         Assessment/Plan:     1) Recurrent SBO - Stable. Recent ex-lap aborted after diffuse adhesions to wall noted. Surgical incision healing well. No SBO symptoms since Feb  2022 admission. GI following. On Miralax daily. If symptoms of potential SBO begin to present, she will switch to liquid diet and contact us immediately. On Metamucil daily. Gen Surg suggested d/c and limiting fiber. Will confirm with GI.    2) T2DM - Fluctuations depending upon diet but generally well controlled recently on Metformin 500 bid. Repeat a1c 1 to 3 mths (last 6.9% in Jan 2022).    3) DJD, lumbar; Foot pains (potential components of OA and neuropathy)   - Okay to continue Tylenol at 1000 bid prn. Start Voltaren gel QID prn. Increasing nightly gabapentin back to 1200 qhs which was effective in past. Continuing 600 qd prn. Consider updated imaging if worsening/persistent.    4) MDS - Stable. Hem/Onc following with plan for continued lab surveillance q3m. GI planning for repeat upper and lower endoscopy (in a few months, allowing for full healing post surgery).    5) GERD - On Protonix 20 qd. Flair-ups with certain foods recently. Okay to use Pepcid prn additionally.    6) UTI - Mild symptoms improving senior living through Augmentin course. Sensitive Klebsiella on culture. POC UA negative today. Complete 1 wk course Augmentin. If symptoms lingering, could consider extending full to full 10 to 14 days.    RTC 2 mths for f/u.

## 2022-03-23 ENCOUNTER — OUTPATIENT CASE MANAGEMENT (OUTPATIENT)
Dept: ADMINISTRATIVE | Facility: OTHER | Age: 77
End: 2022-03-23
Payer: MEDICARE

## 2022-03-23 NOTE — PROGRESS NOTES
Outpatient Care Management  Plan of Care Follow Up Visit    Patient: Anayeli Judd  MRN: [id number]  Date of Service: 03/23/2022  Completed by: Shanthi Whitaker RN  Referral Date: 03/04/2022  Program:     Reason for Visit   Patient presents with    OPCM Chart Review       Brief Summary: Patient has type two DM. She has difficulty with controlling how much sugar and carbs she takes in at times.  She especially has difficulty when she is not at home.    Patient states she is utilizing a diabetic cook book to find sweets recipes to satisfy her sweet cravings. She found a lemon bar recipe that she rather likes and has been using them to curb her sweet cravings.     She went to a party for granddaughter over this past weekend and her blood sugars shot up to the 300's because of all the sweets and starches she ate.  Monday her blood sugar was back down to the 200's and she hasn't taken her glucose yet this morning.    CM reviewed plate method to utilize for when she is away from home when eating. Diabetic Management Guide mailed to patient with plate method marked on page 16.    Next Steps:   Follow up in one week  Follow up on receipt of educational materials  Follow up on implementation of the plate method  Follow up continuing PT/OT  Discuss balancing carbs using portion control and using food labels to count  Discuss visual objects to assist with food portions      Patient Summary     Involvement of Care:  Do I have permission to speak with other family members about your care?  Yes    Patient Reported Labs & Vitals:  1.  Any Patient Reported Labs & Vitals?  Yes  2.  Patient Reported Blood Pressure:     3.  Patient Reported Pulse:     4.  Patient Reported Weight (Kg):     5.  Patient Reported Blood Glucose (mg/dl):  130's - 300's    Medical and social history was reviewed with patient and/or caregiver.     Clinical Assessment     Reviewed and provided basic information on available community resources for mental  health, transportation, wellness resources, and palliative care programs with patient and/or caregiver.     Complex Care Plan     Care plan was discussed and completed today with input from patient and/or caregiver.    Patient Instructions     Instructions were provided via the [facility] patient resources and are available for the patient to view on the patient portal.      Todays OPCM Self-Management Care Plan was developed with the patients/caregivers input and was based on identified barriers from todays assessment.  Goals were written today with the patient/caregiver and the patient has agreed to work towards these goals to improve his/her overall well-being. Patient verbalized understanding of the care plan, goals, and all of today's instructions. Encouraged patient/caregiver to communicate with his/her physician and health care team about health conditions and the treatment plan.  Provided my contact information today and encouraged patient/caregiver to call me with any questions as needed.

## 2022-03-24 ENCOUNTER — DOCUMENT SCAN (OUTPATIENT)
Dept: HOME HEALTH SERVICES | Facility: HOSPITAL | Age: 77
End: 2022-03-24
Payer: MEDICARE

## 2022-03-25 ENCOUNTER — PATIENT OUTREACH (OUTPATIENT)
Dept: HOME HEALTH SERVICES | Facility: HOSPITAL | Age: 77
End: 2022-03-25
Payer: MEDICARE

## 2022-03-29 ENCOUNTER — OFFICE VISIT (OUTPATIENT)
Dept: INTERNAL MEDICINE | Facility: CLINIC | Age: 77
End: 2022-03-29
Payer: MEDICARE

## 2022-03-29 ENCOUNTER — HOSPITAL ENCOUNTER (OUTPATIENT)
Dept: RADIOLOGY | Facility: HOSPITAL | Age: 77
Discharge: HOME | End: 2022-03-29
Attending: INTERNAL MEDICINE
Payer: MEDICARE

## 2022-03-29 VITALS
SYSTOLIC BLOOD PRESSURE: 109 MMHG | DIASTOLIC BLOOD PRESSURE: 72 MMHG | HEART RATE: 80 BPM | HEIGHT: 62 IN | WEIGHT: 127.19 LBS | BODY MASS INDEX: 23.4 KG/M2

## 2022-03-29 DIAGNOSIS — M25.551 RIGHT HIP PAIN: ICD-10-CM

## 2022-03-29 DIAGNOSIS — M54.50 LUMBAR PAIN: ICD-10-CM

## 2022-03-29 DIAGNOSIS — M54.50 LUMBAR PAIN: Primary | ICD-10-CM
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
## 2022-03-29 PROCEDURE — 72110 X-RAY EXAM L-2 SPINE 4/>VWS: CPT | Mod: 26,,, | Performed by: RADIOLOGY

## 2022-03-29 PROCEDURE — 73502 X-RAY EXAM HIP UNI 2-3 VIEWS: CPT | Mod: TC,RT

## 2022-03-29 PROCEDURE — 99999 PR PBB SHADOW E&M-EST. PATIENT-LVL III: CPT | Mod: PBBFAC,,, | Performed by: INTERNAL MEDICINE

## 2022-03-29 PROCEDURE — 72110 X-RAY EXAM L-2 SPINE 4/>VWS: CPT | Mod: TC

## 2022-03-29 PROCEDURE — 99213 OFFICE O/P EST LOW 20 MIN: CPT | Mod: S$PBB,,, | Performed by: INTERNAL MEDICINE

## 2022-03-29 PROCEDURE — 99213 OFFICE O/P EST LOW 20 MIN: CPT | Mod: PBBFAC | Performed by: INTERNAL MEDICINE

## 2022-03-29 PROCEDURE — 72110 XR LUMBAR SPINE COMPLETE 5 VIEW: ICD-10-PCS | Mod: 26,,, | Performed by: RADIOLOGY

## 2022-03-29 PROCEDURE — 99999 PR PBB SHADOW E&M-EST. PATIENT-LVL III: ICD-10-PCS | Mod: PBBFAC,,, | Performed by: INTERNAL MEDICINE

## 2022-03-29 PROCEDURE — 73502 XR HIP WITH PELVIS WHEN PERFORMED, 2 OR 3  VIEWS RIGHT: ICD-10-PCS | Mod: 26,RT,, | Performed by: RADIOLOGY

## 2022-03-29 PROCEDURE — 73502 X-RAY EXAM HIP UNI 2-3 VIEWS: CPT | Mod: 26,RT,, | Performed by: RADIOLOGY

## 2022-03-29 PROCEDURE — 99213 PR OFFICE/OUTPT VISIT, EST, LEVL III, 20-29 MIN: ICD-10-PCS | Mod: S$PBB,,, | Performed by: INTERNAL MEDICINE

## 2022-03-29 NOTE — PROGRESS NOTES
Subjective:       Patient ID: Anayeli Judd is a 77 y.o. female.    Chief Complaint: LBP, right hip pain    HPI:  Recently having increased lbp radiating to right leg. Followed by Dr. Bailee Reynolds in past for djd of cervical spine, hx of lumbar djd as well. Had been on Gabapentin 1200 TID at one point. Recently only using 600 bid but recently increased to 1200 again at night and now using TID prn again for recent flair. Fortunately this has been very effective and well tolerated. Using Tylenol prn as well.   Mechanical fall in bathroom 2 nights ago. Fell on right side. Some mild right hip pain and small skin tear at right elbow. Overall ROM intact and bearing weight without issue now.     Generally feels she is here out of caution. Overall feels she is doing well.    Review of Systems   Constitutional: Negative for fatigue and fever.   Gastrointestinal: Positive for diarrhea. Negative for abdominal pain and nausea.   Genitourinary: Negative for difficulty urinating, dysuria and urgency.   Musculoskeletal: Positive for arthralgias and back pain. Negative for gait problem, joint swelling and neck pain.   Skin: Positive for wound. Negative for color change.   Neurological: Negative for syncope, weakness and numbness.       Past Medical History:   Diagnosis Date    Abdominal pain     Allergic rhinitis     Arthritis     Blood platelet disorder     Blood platelet disorder     Blood transfusion     during delivery and     Bowel obstruction     Cervical radiculopathy     followed by dr reynolds    Colon cancer     transverse colon; resected; Stage IIA (pT3 pN0 MX)    Diabetes mellitus     Diarrhea     Falls 2020    Family history of breast cancer     Family history of colon cancer     Fatty liver     GERD (gastroesophageal reflux disease)     History of shingles     Hyperlipidemia     Hypothyroidism     Irritable bowel syndrome     Microscopic colitis     treated      Myelodysplastic syndrome     Raynaud phenomenon     Raynaud's disease     Type 2 diabetes mellitus          Current Outpatient Medications:     biotin 10,000 mcg TbDL, Take 1 tablet by mouth once daily. , Disp: , Rfl:     calcium-vitamin D3 (OS-KASSANDRA 500 + D3) 500 mg-5 mcg (200 unit) per tablet, Take 1,200 tablets by mouth once daily., Disp: , Rfl:     co-enzyme Q-10 30 mg capsule, Take 30 mg by mouth 3 (three) times daily., Disp: , Rfl:     conjugated estrogens (PREMARIN) vaginal cream, INSERT 1/2 GRAM VAGINALLY  TWICE WEEKLY, Disp: 30 g, Rfl: 3    cranberry extract 200 mg Cap, Take 1 capsule by mouth once daily., Disp: , Rfl:     D-MANNOSE ORAL, Take 1 tablet by mouth once daily. , Disp: , Rfl:     diclofenac sodium (VOLTAREN) 1 % Gel, Apply 2 g topically 4 (four) times daily as needed (Back pain)., Disp: 350 g, Rfl: 2    DULoxetine (CYMBALTA) 30 MG capsule, Take 1 capsule (30 mg total) by mouth 2 (two) times daily., Disp: 180 capsule, Rfl: 3    famotidine (PEPCID) 20 MG tablet, Take 1 tablet (20 mg total) by mouth 2 (two) times daily as needed for Heartburn., Disp: 60 tablet, Rfl: 2    flash glucose sensor (FREESTYLE EFREN 14 DAY SENSOR) Kit, 1 application by Misc.(Non-Drug; Combo Route) route every 14 (fourteen) days. Disp 30 or 90 day refill, Disp: 6 kit, Rfl: 6    fluticasone propionate (FLONASE) 50 mcg/actuation nasal spray, 1 spray by Each Nostril route daily as needed for Rhinitis., Disp: , Rfl:     FREESTYLE EFREN 10 DAY READER [facility], TEST as directed, Disp: 3 each, Rfl: 3    FREESTYLE LITE STRIPS Strp, TEST DAILY AS DIRECTED, Disp: 50 strip, Rfl: 2    gabapentin (NEURONTIN) 600 MG tablet, Take 1 tablet (600 mg total) by mouth 2 (two) times daily., Disp: 180 tablet, Rfl: 3    hydrocortisone 2.5 % cream, Apply topically 2 (two) times daily as needed., Disp: 1 each, Rfl: 1    hyoscyamine (ANASPAZ,LEVSIN) 0.125 mg Tab, Take 1 tablet (125 mcg total) by mouth every 8 (eight) hours as needed  (urgency)., Disp: 90 tablet, Rfl: 3    levothyroxine (SYNTHROID) 75 MCG tablet, Take 1 tablet (75 mcg total) by mouth before breakfast., Disp: 90 tablet, Rfl: 3    LIDOcaine (LIDODERM) 5 %, Place 1 patch onto the skin once daily. Remove & Discard patch within 12 hours or as directed by MD, Disp: 30 patch, Rfl: 0    magnesium 250 mg Tab, Take 1,000 mg by mouth once daily., Disp: , Rfl:     metFORMIN (GLUCOPHAGE) 500 MG tablet, Take 500 mg by mouth 2 (two) times daily with meals., Disp: , Rfl:     montelukast (SINGULAIR) 10 mg tablet, TAKE 1 TABLET BY MOUTH  DAILY, Disp: 90 tablet, Rfl: 3    ondansetron (ZOFRAN-ODT) 8 MG TbDL, Take 1 tablet (8 mg total) by mouth every 8 (eight) hours as needed (nausea)., Disp: 30 tablet, Rfl: 0    oxyCODONE (ROXICODONE) 5 MG immediate release tablet, Take 1 tablet (5 mg total) by mouth every 6 (six) hours as needed for Pain., Disp: 20 tablet, Rfl: 0    pantoprazole (PROTONIX) 20 MG tablet, TAKE 1 TABLET(20 MG) BY MOUTH EVERY DAY, Disp: 30 tablet, Rfl: 2    polyethylene glycol (GLYCOLAX) 17 gram/dose powder, Take 17 g by mouth before dinner., Disp: 1530 g, Rfl: 1    senna-docusate 8.6-50 mg (PERICOLACE) 8.6-50 mg per tablet, Take 1 tablet by mouth 2 (two) times daily as needed for Constipation., Disp: , Rfl:     vitamin D 1000 units Tab, Take 1,000 Units by mouth once daily. D3, Disp: , Rfl:     acetaminophen (TYLENOL) 650 MG TbSR, Take 650 mg by mouth once as needed (pain)., Disp: , Rfl:     ascorbic acid, vitamin C, (VITAMIN C) 1000 MG tablet, Take 1,000 mg by mouth once daily., Disp: , Rfl:     atorvastatin (LIPITOR) 40 MG tablet, TAKE 1 TABLET BY MOUTH  DAILY, Disp: 90 tablet, Rfl: 3    azelastine (ASTELIN) 137 mcg (0.1 %) nasal spray, 2 sprays (274 mcg total) by Nasal route 2 (two) times daily as needed for Rhinitis., Disp: , Rfl:     Past Surgical History:   Procedure Laterality Date    APPENDECTOMY      BACK SURGERY      CARPAL TUNNEL RELEASE      bilateral       SECTION      CHOLECYSTECTOMY  1965    COLECTOMY  2011    Transverse colon resection by Dr. Aguirre    COLONOSCOPY N/A 2017    Procedure: COLONOSCOPY;  Surgeon: Manjit Alvarez MD;  Location: [facility] (4TH FLR);  Service: Endoscopy;  Laterality: N/A;    COLONOSCOPY N/A 2019    Procedure: COLONOSCOPY;  Surgeon: Ramiro Jefferson MD;  Location: [facility] (4TH FLR);  Service: Endoscopy;  Laterality: N/A;  PM prep    CYSTOSCOPY N/A 2021    Procedure: CYSTOSCOPY;  Surgeon: Viridiana Valenzuela MD;  Location: [facility] 1ST FLR;  Service: Urology;  Laterality: N/A;    ENDOSCOPIC ULTRASOUND OF UPPER GASTROINTESTINAL TRACT N/A 2018    Procedure: ULTRASOUND, UPPER GI TRACT, ENDOSCOPIC;  Surgeon: Jose Hess MD;  Location: [facility];  Service: Endoscopy;  Laterality: N/A;    ENDOSCOPIC ULTRASOUND OF UPPER GASTROINTESTINAL TRACT N/A 2019    Procedure: ULTRASOUND, UPPER GI TRACT, ENDOSCOPIC;  Surgeon: Jose Hess MD;  Location: [facility] (2ND FLR);  Service: Endoscopy;  Laterality: N/A;    ESOPHAGOGASTRODUODENOSCOPY N/A 2018    Procedure: EGD (ESOPHAGOGASTRODUODENOSCOPY);  Surgeon: Angelo Reynolds MD;  Location: [facility] (2ND FLR);  Service: Endoscopy;  Laterality: N/A;    ESOPHAGOGASTRODUODENOSCOPY N/A 2019    Procedure: EGD (ESOPHAGOGASTRODUODENOSCOPY);  Surgeon: Ramiro Jefferson MD;  Location: [facility] (4TH FLR);  Service: Endoscopy;  Laterality: N/A;    EYE SURGERY      Cataract Removal    FLUOROSCOPIC URODYNAMIC STUDY N/A 2021    Procedure: URODYNAMIC STUDY, FLUOROSCOPIC;  Surgeon: Viridiana Valenzuela MD;  Location: [facility] OR 1ST FLR;  Service: Urology;  Laterality: N/A;  90 minutes     HYSTERECTOMY      posterolateral fusion with autograft bone and Woods mineralized bone matrix  13    at [facility] for lumbar spine stenosis    TOE SURGERY      TONSILLECTOMY      TRIGGER FINGER RELEASE         Social History     Tobacco Use    Smoking  status: Never Smoker    Smokeless tobacco: Never Used   Substance Use Topics    Alcohol use: Yes     Comment: socially, hardly ever    Drug use: No         Objective:      Vitals:    03/29/22 1452   BP: 109/72   Pulse: 80       Physical Exam  Constitutional:       General: She is not in acute distress.     Appearance: Normal appearance. She is normal weight. She is not ill-appearing, toxic-appearing or diaphoretic.   HENT:      Head: Normocephalic and atraumatic.   Eyes:      Extraocular Movements: Extraocular movements intact.      Conjunctiva/sclera: Conjunctivae normal.   Cardiovascular:      Rate and Rhythm: Normal rate.   Pulmonary:      Effort: Pulmonary effort is normal. No respiratory distress.   Abdominal:      General: Abdomen is flat. There is no distension.      Palpations: Abdomen is soft.   Musculoskeletal:         General: Tenderness (Mild right lateral hip/upper femur.) present. No swelling, deformity or signs of injury. Normal range of motion.      Cervical back: Normal range of motion. No rigidity or tenderness.      Right lower leg: No edema.      Left lower leg: No edema.   Skin:     Findings: Lesion (Small skin tear at right posterior elbow.) present. No bruising or rash.   Neurological:      General: No focal deficit present.      Mental Status: She is alert and oriented to person, place, and time.      Cranial Nerves: No cranial nerve deficit.      Motor: No weakness.      Coordination: Coordination normal.      Gait: Gait normal.      Deep Tendon Reflexes: Reflexes normal.   Psychiatric:         Mood and Affect: Mood normal.         Behavior: Behavior normal.         Thought Content: Thought content normal.         Judgment: Judgment normal.           Assessment/Plan:     1) LBP , Lumbar DJD  - Chronic, flairing recently. Getting significant improvement with Gabapentin 1200 TID prn. Has used this dosage temporarily in past as well. Will continue for now and consider PT and/or Ortho  reassessment if persistent over next several weeks. Long-term, will still have goal for reduced gabapentin dosage/frequency. Lower suspicion but ordering lumbar plain films to r/o fx.    2) Right hip pain post mechanical fall  - Mild lateral hip ttp but otherwise intact exam. Checking right hip plain films.

## 2022-03-30 ENCOUNTER — OUTPATIENT CASE MANAGEMENT (OUTPATIENT)
Dept: ADMINISTRATIVE | Facility: OTHER | Age: 77
End: 2022-03-30
Payer: MEDICARE

## 2022-03-30 NOTE — PROGRESS NOTES
Outpatient Care Management  Plan of Care Follow Up Visit    Patient: Anayeli Judd  MRN: [id number]  Date of Service: 03/30/2022  Completed by: Shanthi Whitaker RN  Referral Date: 03/04/2022  Program:     Reason for Visit   Patient presents with    OPCM Chart Review     3/30/2022       Brief Summary: Patient is a type  2 DM and although she has trouble when not at home, her cook usually measures her meals now since our last conversation. She has not been out since her granddaughters party so has not used the plate method described to her on the last phone call. Her blood sugar this morning is 153, which she states is a little higher than her normal (130-140). She is focusing on 40 gm of carbs per meal. Patient has not received her diabetic management guide yet.    Next Steps:   Follow up in one week  Follow up on use of DME (cane, walker)  Follow up on diaper size and fit  Follow up on receipt of educational materials  Follow up on implementation of the plate method  Follow up on exercise three times a week to 20 minutes  Discuss balancing carbs using portion control and using food labels to count  Follow up on using visual objects to assist with food portions     Patient Summary     Involvement of Care:  Do I have permission to speak with other family members about your care?   yes    Patient Reported Labs & Vitals:  1.  Any Patient Reported Labs & Vitals?     2.  Patient Reported Blood Pressure:     3.  Patient Reported Pulse:     4.  Patient Reported Weight (Kg):     5.  Patient Reported Blood Glucose (mg/dl):   153    Medical and social history was reviewed with patient and/or caregiver.     Clinical Assessment     Reviewed and provided basic information on available community resources for mental health, transportation, wellness resources, and palliative care programs with patient and/or caregiver.     Complex Care Plan     Care plan was discussed and completed today with input from patient and/or  caregiver.    Patient Instructions     Instructions were provided via the [facility] patient resources and are available for the patient to view on the patient portal.    Todays OPCM Self-Management Care Plan was developed with the patients/caregivers input and was based on identified barriers from todays assessment.  Goals were written today with the patient/caregiver and the patient has agreed to work towards these goals to improve his/her overall well-being. Patient verbalized understanding of the care plan, goals, and all of today's instructions. Encouraged patient/caregiver to communicate with his/her physician and health care team about health conditions and the treatment plan.  Provided my contact information today and encouraged patient/caregiver to call me with any questions as needed.

## 2022-04-02 ENCOUNTER — TELEPHONE (OUTPATIENT)
Dept: INTERNAL MEDICINE | Facility: CLINIC | Age: 77
End: 2022-04-02
Payer: MEDICARE

## 2022-04-02 DIAGNOSIS — N39.0 RECURRENT UTI: Primary | ICD-10-CM

## 2022-04-02 RX ORDER — CEFUROXIME AXETIL 500 MG/1
500 TABLET ORAL EVERY 12 HOURS
Qty: 10 TABLET | Refills: 0 | Status: ON HOLD | OUTPATIENT
Start: 2022-04-02 | End: 2022-06-11 | Stop reason: HOSPADM

## 2022-04-02 NOTE — TELEPHONE ENCOUNTER
Treated patient for a uti empirically on Saturday WITH ceftin 500 for 5 days    She just had one on 3/17 tx with 7 days of augmentin 500  Klebsiella   Also on 2/9 had klebsiella     PATIENTS DAUGHTER WILL BRING IN URINE SAMPLE TODAY /Monday TO SEND OFF FOR URINALYSIS   ORDER IN

## 2022-04-04 ENCOUNTER — LAB VISIT (OUTPATIENT)
Dept: LAB | Facility: HOSPITAL | Age: 77
End: 2022-04-04
Attending: INTERNAL MEDICINE
Payer: MEDICARE

## 2022-04-04 DIAGNOSIS — N39.0 RECURRENT UTI: ICD-10-CM

## 2022-04-04 LAB
BILIRUB UR QL STRIP: NEGATIVE
CLARITY UR REFRACT.AUTO: CLEAR
COLOR UR AUTO: YELLOW
GLUCOSE UR QL STRIP: NEGATIVE
HGB UR QL STRIP: NEGATIVE
KETONES UR QL STRIP: NEGATIVE
LEUKOCYTE ESTERASE UR QL STRIP: ABNORMAL
MICROSCOPIC COMMENT: ABNORMAL
NITRITE UR QL STRIP: NEGATIVE
PH UR STRIP: 6 [PH] (ref 5–8)
PROT UR QL STRIP: NEGATIVE
RBC #/AREA URNS AUTO: 1 /HPF (ref 0–4)
SP GR UR STRIP: 1.01 (ref 1–1.03)
URN SPEC COLLECT METH UR: ABNORMAL
WBC #/AREA URNS AUTO: 16 /HPF (ref 0–5)
WBC CLUMPS UR QL AUTO: ABNORMAL

## 2022-04-04 PROCEDURE — 81001 URINALYSIS AUTO W/SCOPE: CPT | Performed by: INTERNAL MEDICINE

## 2022-04-04 PROCEDURE — 87086 URINE CULTURE/COLONY COUNT: CPT | Performed by: INTERNAL MEDICINE

## 2022-04-05 ENCOUNTER — OUTPATIENT CASE MANAGEMENT (OUTPATIENT)
Dept: ADMINISTRATIVE | Facility: OTHER | Age: 77
End: 2022-04-05
Payer: MEDICARE

## 2022-04-05 DIAGNOSIS — Z71.89 COMPLEX CARE COORDINATION: ICD-10-CM

## 2022-04-05 NOTE — PROGRESS NOTES
Outpatient Care Management  Plan of Care Follow Up Visit    Patient: Anayeli Judd  MRN: [id number]  Date of Service: 04/05/2022  Completed by: Shanthi Whitaker RN  Referral Date: 03/04/2022  Program:     Reason for Visit   Patient presents with    OPCM Chart Review     4/5/2022       Brief Summary: Patient states she hasn't been going out anywhere to need to utilize the plate method. Her foods are measured at home by her caregiver, Keay. She tries to stay around 40 gms of carbs per meal and 15-20 gms for a snack. She reports her blood sugar was 253 after breakfast one day that she had oatmeal with a cup of fruit. CM informed patient that oatmeal is a carb and  will make the blood sugar go up some as does the fruit with it's natural sugars. Patient also states she eats Chobani greek yogurt for a snack. CM asked how many gms of sugar a serving has, patient states 16 gms. CM informed patient that is a lot of sugar for one snack being on a diabetic diet. Patient has been ambulating around the house without difficulty this week. She states she doesn't wear a depends during the day, only at night. She states she wears a pad in her underwear during the day. Patient still not using DME to ambulate. CM asked patient if she received the educational materials that was sent to her, she stated yes she received them but hasn't read them yet.    CM encouraged patient to continue to measure foods at home and to use food labels to count carbs. CM encouraged patient to get Chobani zero sugar instead of the regular Chobani. CM encouraged patient to use at least a cane to ambulate around the house for stability in case of another slip, encouraged her to read educational materials that were sent to her.    Next Steps:   Follow up in one week  Follow up on use of DME (cane, walker)  Follow up on implementation of the plate method  Follow up on Chobani zero sugar  Follow up on measuring food at home  Follow up on balancing carbs  using portion control and using food labels to count  Follow up if taking probiotics   Follow up if read educational materials    Follow up in about 1 week (around 4/12/2022).    Patient Summary     Involvement of Care:  Do I have permission to speak with other family members about your care?  Yes    Patient Reported Labs & Vitals:  1.  Any Patient Reported Labs & Vitals?  Yes  2.  Patient Reported Blood Pressure:     3.  Patient Reported Pulse:     4.  Patient Reported Weight (Kg):     5.  Patient Reported Blood Glucose (mg/dl):  253    Medical and social history was reviewed with patient and/or caregiver.     Clinical Assessment     Reviewed and provided basic information on available community resources for mental health, transportation, wellness resources, and palliative care programs with patient and/or caregiver.     Complex Care Plan     Care plan was discussed and completed today with input from patient and/or caregiver.    Patient Instructions     Instructions were provided via the [facility] patient resources and are available for the patient to view on the patient portal.    Todays OPCM Self-Management Care Plan was developed with the patients/caregivers input and was based on identified barriers from todays assessment.  Goals were written today with the patient/caregiver and the patient has agreed to work towards these goals to improve his/her overall well-being. Patient verbalized understanding of the care plan, goals, and all of today's instructions. Encouraged patient/caregiver to communicate with his/her physician and health care team about health conditions and the treatment plan.  Provided my contact information today and encouraged patient/caregiver to call me with any questions as needed.

## 2022-04-06 ENCOUNTER — OFFICE VISIT (OUTPATIENT)
Dept: UROLOGY | Facility: CLINIC | Age: 77
End: 2022-04-06
Payer: MEDICARE

## 2022-04-06 VITALS
DIASTOLIC BLOOD PRESSURE: 67 MMHG | BODY MASS INDEX: 23.53 KG/M2 | TEMPERATURE: 67 F | WEIGHT: 127.88 LBS | SYSTOLIC BLOOD PRESSURE: 110 MMHG | HEIGHT: 62 IN

## 2022-04-06 DIAGNOSIS — R39.15 URINARY URGENCY: ICD-10-CM

## 2022-04-06 DIAGNOSIS — N39.41 URGE INCONTINENCE: Primary | ICD-10-CM

## 2022-04-06 DIAGNOSIS — R35.0 INCREASED FREQUENCY OF URINATION: ICD-10-CM

## 2022-04-06 LAB — BACTERIA UR CULT: NO GROWTH

## 2022-04-06 PROCEDURE — 99215 PR OFFICE/OUTPT VISIT, EST, LEVL V, 40-54 MIN: ICD-10-PCS | Mod: S$PBB,,, | Performed by: UROLOGY

## 2022-04-06 PROCEDURE — 99999 PR PBB SHADOW E&M-EST. PATIENT-LVL III: CPT | Mod: PBBFAC,,, | Performed by: UROLOGY

## 2022-04-06 PROCEDURE — 99999 PR PBB SHADOW E&M-EST. PATIENT-LVL III: ICD-10-PCS | Mod: PBBFAC,,, | Performed by: UROLOGY

## 2022-04-06 PROCEDURE — 99213 OFFICE O/P EST LOW 20 MIN: CPT | Mod: PBBFAC | Performed by: UROLOGY

## 2022-04-06 PROCEDURE — 99215 OFFICE O/P EST HI 40 MIN: CPT | Mod: S$PBB,,, | Performed by: UROLOGY

## 2022-04-06 NOTE — PROGRESS NOTES
CHIEF COMPLAINT:    Mrs. Judd is a 77 y.o. female presenting for a follow up on urgency frequency and urge incontinence.     PRESENTING ILLNESS:    Anayeli Judd is a 77 y.o. female who returns accompanied by Keya, her caregiver.  She states she was in the hospital for a studdering SBO but they were unable to do a lysis of adhesions (I reviewed the op note and found it to be true.)  She would like to discuss SNM    REVIEW OF SYSTEMS:    Review of Systems   Constitutional: Negative.    HENT: Negative.    Eyes: Negative.    Respiratory: Negative.    Cardiovascular: Negative.    Gastrointestinal:        Fecal incontinene   Genitourinary: Positive for frequency and urgency.        Urge incontinence   Musculoskeletal: Positive for falls.   Skin: Negative.    Endo/Heme/Allergies: Negative.    Psychiatric/Behavioral: Negative.        PATIENT HISTORY:    Past Medical History:   Diagnosis Date    Abdominal pain     Allergic rhinitis     Arthritis     Blood platelet disorder     Blood platelet disorder     Blood transfusion     during delivery and     Bowel obstruction     Cervical radiculopathy     followed by dr cloud    Colon cancer     transverse colon; resected; Stage IIA (pT3 pN0 MX)    Diabetes mellitus     Diarrhea     Falls 2020    Family history of breast cancer     Family history of colon cancer     Fatty liver     GERD (gastroesophageal reflux disease)     History of shingles     Hyperlipidemia     Hypothyroidism     Irritable bowel syndrome     Microscopic colitis     treated     Myelodysplastic syndrome     Raynaud phenomenon     Raynaud's disease     Type 2 diabetes mellitus        Past Surgical History:   Procedure Laterality Date    APPENDECTOMY      BACK SURGERY      CARPAL TUNNEL RELEASE      bilateral      SECTION      CHOLECYSTECTOMY  1965    COLECTOMY  2011    Transverse colon resection by Dr. Aguirre    COLONOSCOPY N/A 2017     Procedure: COLONOSCOPY;  Surgeon: Manjit Alvarez MD;  Location: [facility] (4TH FLR);  Service: Endoscopy;  Laterality: N/A;    COLONOSCOPY N/A 6/26/2019    Procedure: COLONOSCOPY;  Surgeon: Ramiro Jefferson MD;  Location: [facility] (4TH FLR);  Service: Endoscopy;  Laterality: N/A;  PM prep    CYSTOSCOPY N/A 11/9/2021    Procedure: CYSTOSCOPY;  Surgeon: Viridiana Valenzuela MD;  Location: [facility] OR 1ST FLR;  Service: Urology;  Laterality: N/A;    ENDOSCOPIC ULTRASOUND OF UPPER GASTROINTESTINAL TRACT N/A 12/12/2018    Procedure: ULTRASOUND, UPPER GI TRACT, ENDOSCOPIC;  Surgeon: Jose Hess MD;  Location: [facility];  Service: Endoscopy;  Laterality: N/A;    ENDOSCOPIC ULTRASOUND OF UPPER GASTROINTESTINAL TRACT N/A 1/23/2019    Procedure: ULTRASOUND, UPPER GI TRACT, ENDOSCOPIC;  Surgeon: Jose Hess MD;  Location: [facility] (2ND FLR);  Service: Endoscopy;  Laterality: N/A;    ESOPHAGOGASTRODUODENOSCOPY N/A 11/16/2018    Procedure: EGD (ESOPHAGOGASTRODUODENOSCOPY);  Surgeon: Angelo Reynolds MD;  Location: [facility] (2ND FLR);  Service: Endoscopy;  Laterality: N/A;    ESOPHAGOGASTRODUODENOSCOPY N/A 6/26/2019    Procedure: EGD (ESOPHAGOGASTRODUODENOSCOPY);  Surgeon: Ramiro Jefferson MD;  Location: [facility] (4TH FLR);  Service: Endoscopy;  Laterality: N/A;    EYE SURGERY      Cataract Removal    FLUOROSCOPIC URODYNAMIC STUDY N/A 11/9/2021    Procedure: URODYNAMIC STUDY, FLUOROSCOPIC;  Surgeon: Viridiana Valenzuela MD;  Location: [facility] OR 1ST FLR;  Service: Urology;  Laterality: N/A;  90 minutes     HYSTERECTOMY      posterolateral fusion with autograft bone and Eun mineralized bone matrix  2/1/13    at [facility] for lumbar spine stenosis    TOE SURGERY      TONSILLECTOMY      TRIGGER FINGER RELEASE         Family History   Problem Relation Age of Onset    Heart disease Father 50        Mi age 50    Colon cancer Father     Bladder Cancer Mother         non smoker    Cataracts Mother     Glaucoma  Mother     Heart disease Mother     Hyperlipidemia Mother     Kidney disease Mother     Breast cancer Sister 79    Arthritis Daughter     Asthma Daughter     Depression Daughter     Hypertension Daughter     Stroke Daughter 40    Arthritis Brother     Colon cancer Brother 70    Alcohol abuse Brother     Parkinsonism Brother     Alcohol abuse Brother     Depression Daughter        Social History     Socioeconomic History    Marital status:    Tobacco Use    Smoking status: Never Smoker    Smokeless tobacco: Never Used   Substance and Sexual Activity    Alcohol use: Yes     Comment: socially, hardly ever    Drug use: No    Sexual activity: Not Currently   Social History Narrative    , lives alone.  Primary support are her children and friends.     Social Determinants of Health     Financial Resource Strain: Low Risk     Difficulty of Paying Living Expenses: Not hard at all   Food Insecurity: No Food Insecurity    Worried About Running Out of Food in the Last Year: Never true    Ran Out of Food in the Last Year: Never true   Transportation Needs: No Transportation Needs    Lack of Transportation (Medical): No    Lack of Transportation (Non-Medical): No   Physical Activity: Inactive    Days of Exercise per Week: 0 days    Minutes of Exercise per Session: 0 min   Stress: No Stress Concern Present    Feeling of Stress : Not at all   Social Connections: Unknown    Frequency of Communication with Friends and Family: More than three times a week    Frequency of Social Gatherings with Friends and Family: More than three times a week    Marital Status:    Housing Stability: Low Risk     Unable to Pay for Housing in the Last Year: No    Number of Places Lived in the Last Year: 1    Unstable Housing in the Last Year: No       Allergies:  Codeine; Dilaudid [hydromorphone (bulk)]; Percocet [oxycodone-acetaminophen]; Sulfa (sulfonamide antibiotics); and Latex, natural  rubber    Medications:  Outpatient Encounter Medications as of 4/6/2022   Medication Sig Dispense Refill    atorvastatin (LIPITOR) 40 MG tablet TAKE 1 TABLET BY MOUTH  DAILY 90 tablet 3    DULoxetine (CYMBALTA) 30 MG capsule Take 1 capsule (30 mg total) by mouth 2 (two) times daily. 180 capsule 3    hyoscyamine (ANASPAZ,LEVSIN) 0.125 mg Tab Take 1 tablet (125 mcg total) by mouth every 8 (eight) hours as needed (urgency). 90 tablet 3    levothyroxine (SYNTHROID) 75 MCG tablet Take 1 tablet (75 mcg total) by mouth before breakfast. 90 tablet 3    metFORMIN (GLUCOPHAGE) 500 MG tablet Take 500 mg by mouth 2 (two) times daily with meals.      montelukast (SINGULAIR) 10 mg tablet TAKE 1 TABLET BY MOUTH  DAILY 90 tablet 3    pantoprazole (PROTONIX) 20 MG tablet TAKE 1 TABLET(20 MG) BY MOUTH EVERY DAY 30 tablet 2    acetaminophen (TYLENOL) 650 MG TbSR Take 650 mg by mouth once as needed (pain).      ascorbic acid, vitamin C, (VITAMIN C) 1000 MG tablet Take 1,000 mg by mouth once daily.      azelastine (ASTELIN) 137 mcg (0.1 %) nasal spray 2 sprays (274 mcg total) by Nasal route 2 (two) times daily as needed for Rhinitis.      biotin 10,000 mcg TbDL Take 1 tablet by mouth once daily.       calcium-vitamin D3 (OS-KASSANDRA 500 + D3) 500 mg-5 mcg (200 unit) per tablet Take 1,200 tablets by mouth once daily.      cefUROXime (CEFTIN) 500 MG tablet Take 1 tablet (500 mg total) by mouth every 12 (twelve) hours. 10 tablet 0    co-enzyme Q-10 30 mg capsule Take 30 mg by mouth 3 (three) times daily.      conjugated estrogens (PREMARIN) vaginal cream INSERT 1/2 GRAM VAGINALLY  TWICE WEEKLY 30 g 3    cranberry extract 200 mg Cap Take 1 capsule by mouth once daily.      D-MANNOSE ORAL Take 1 tablet by mouth once daily.       diclofenac sodium (VOLTAREN) 1 % Gel Apply 2 g topically 4 (four) times daily as needed (Back pain). 350 g 2    famotidine (PEPCID) 20 MG tablet Take 1 tablet (20 mg total) by mouth 2 (two) times daily  as needed for Heartburn. 60 tablet 2    flash glucose sensor (FREESTYLE EFREN 14 DAY SENSOR) Kit 1 application by Misc.(Non-Drug; Combo Route) route every 14 (fourteen) days. Disp 30 or 90 day refill 6 kit 6    fluticasone propionate (FLONASE) 50 mcg/actuation nasal spray 1 spray by Each Nostril route daily as needed for Rhinitis.      FREESTYLE EFREN 10 DAY READER Misc TEST as directed 3 each 3    FREESTYLE LITE STRIPS Strp TEST DAILY AS DIRECTED 50 strip 2    gabapentin (NEURONTIN) 600 MG tablet Take 1 tablet (600 mg total) by mouth 2 (two) times daily. 180 tablet 3    hydrocortisone 2.5 % cream Apply topically 2 (two) times daily as needed. 1 each 1    LIDOcaine (LIDODERM) 5 % Place 1 patch onto the skin once daily. Remove & Discard patch within 12 hours or as directed by MD 30 patch 0    magnesium 250 mg Tab Take 1,000 mg by mouth once daily.      ondansetron (ZOFRAN-ODT) 8 MG TbDL Take 1 tablet (8 mg total) by mouth every 8 (eight) hours as needed (nausea). 30 tablet 0    oxyCODONE (ROXICODONE) 5 MG immediate release tablet Take 1 tablet (5 mg total) by mouth every 6 (six) hours as needed for Pain. 20 tablet 0    polyethylene glycol (GLYCOLAX) 17 gram/dose powder Take 17 g by mouth before dinner. 1530 g 1    senna-docusate 8.6-50 mg (PERICOLACE) 8.6-50 mg per tablet Take 1 tablet by mouth 2 (two) times daily as needed for Constipation.      vitamin D 1000 units Tab Take 1,000 Units by mouth once daily. D3       No facility-administered encounter medications on file as of 4/6/2022.         PHYSICAL EXAMINATION:    The patient generally appears in good health, is appropriately interactive, and is in no apparent distress.    Skin: No lesions.    Mental: Cooperative with normal affect.    Neuro: Grossly intact.    HEENT: Normal. No evidence of lymphadenopathy.    Chest:  normal inspiratory effort.    Abdomen:  Soft, non-tender. No masses or organomegaly. Bladder is not palpable. No evidence of flank  discomfort. No evidence of inguinal hernia.    Extremities: No clubbing, cyanosis, or edema      LABS:    Lab Results   Component Value Date    BUN 11 03/21/2022    CREATININE 0.9 03/21/2022         IMPRESSION:    Encounter Diagnoses   Name Primary?    Urge incontinence Yes    Urinary urgency     Increased frequency of urination        PLAN:    1. Discussed the stage I and stage II [employer] device.  Discussed the staged procedure  2.  She would like to discuss with her family  3.  QR code given

## 2022-04-12 ENCOUNTER — TELEPHONE (OUTPATIENT)
Dept: INTERNAL MEDICINE | Facility: CLINIC | Age: 77
End: 2022-04-12
Payer: MEDICARE

## 2022-04-12 ENCOUNTER — OUTPATIENT CASE MANAGEMENT (OUTPATIENT)
Dept: ADMINISTRATIVE | Facility: OTHER | Age: 77
End: 2022-04-12
Payer: MEDICARE

## 2022-04-12 NOTE — PROGRESS NOTES
Outpatient Care Management  Plan of Care Follow Up Visit    Patient: Anayeli Judd  MRN: [id number]  Date of Service: 04/12/2022  Completed by: Shanthi Whitaker RN  Referral Date: 03/04/2022  Program:     Reason for Visit   Patient presents with    OPCM Chart Review     4/12/2022    Follow-up     4/12/2022    Update Plan Of Care     4/12/2022       Brief Summary: Patient's blood sugar this morning was 159 where she usually runs 130-140.  She ate oatmeal with a cup of fruit for breakfast like she usually does.  She normally eats again around 3:00-3:30 because that's when she gets hungry and eats her big meal at this time.  That is the last meal she eats for the day and then may have a snack later on in the evening.  She states she likes Chobani greek yogurt but hasn't gotten any yet.  Her sitter still measures out her food for her meals at home.  She informed CM she is having dark, coffee ground diarrhea several times a day and fever of 99 where her baseline is 97.  Patient states she is not taking probiotics at this time.  Patient states she hasn't been out to use the plate method to serve herself.  Patient states she is ambulating around the house without difficulty using her sitters arm for stability.  She previously had a slip in her feces a couple of weeks ago.  She is not using her cane or walker for stability at this time.  She states she has read the education sent on falls, use of cane and use of walker.     CM discussed with patient about getting Chobani zero sugar instead of regular Chobani and getting Align probiotics to take.  CM informed Dr. Guthrie via messaging in Epic about patient's coffee ground diarrhea and fever of 99.  CM encouraged patient to use her cane when ambulating around the house for stability to try and help prevent another fall.      Next Steps:   Follow up in two week  Follow up on use of DME (cane, walker)  Follow up on implementation of the plate method  Follow up on Chobani  zero sugar  Follow up on balancing carbs using portion control and using food labels to count  Follow up if taking probiotics     Follow up in about 2 weeks (around 4/26/2022).  Patient Summary     Involvement of Care:  Do I have permission to speak with other family members about your care?       Patient Reported Labs & Vitals:  1.  Any Patient Reported Labs & Vitals?     2.  Patient Reported Blood Pressure:     3.  Patient Reported Pulse:     4.  Patient Reported Weight (Kg):     5.  Patient Reported Blood Glucose (mg/dl):       Medical and social history was reviewed with patient and/or caregiver.     Clinical Assessment     Reviewed and provided basic information on available community resources for mental health, transportation, wellness resources, and palliative care programs with patient and/or caregiver.     Complex Care Plan     Care plan was discussed and completed today with input from patient and/or caregiver.    Patient Instructions     Instructions were provided via the [employer] patient resources and are available for the patient to view on the patient portal.        Follow up in about 2 weeks (around 4/26/2022).    Todays OPCM Self-Management Care Plan was developed with the patients/caregivers input and was based on identified barriers from todays assessment.  Goals were written today with the patient/caregiver and the patient has agreed to work towards these goals to improve his/her overall well-being. Patient verbalized understanding of the care plan, goals, and all of today's instructions. Encouraged patient/caregiver to communicate with his/her physician and health care team about health conditions and the treatment plan.  Provided my contact information today and encouraged patient/caregiver to call me with any questions as needed.

## 2022-04-12 NOTE — TELEPHONE ENCOUNTER
contacted clinic since Mrs. Anayeli mentioned coffee ground type stools with diarrhea.   Called and spoke with pt. Stool has baseline been coffee-ground in appearance for a while now, even prior to our meeting earlier this year. No red blood. Having diarrhea recently. On abx for UTI x2 recently (Augmentin then Ceftin). Has held her normally taken Miralax (recurrent SBO hx) momentarily. Follows with GI. Planning for EGD next mth. No recurrence of SBO symptoms recently.  Agreed on being cautious. She will come in to clinic to see me tomorrow. Will repeat cbc, bmp at that time.

## 2022-04-13 ENCOUNTER — LAB VISIT (OUTPATIENT)
Dept: LAB | Facility: HOSPITAL | Age: 77
End: 2022-04-13
Attending: INTERNAL MEDICINE
Payer: MEDICARE

## 2022-04-13 ENCOUNTER — OFFICE VISIT (OUTPATIENT)
Dept: INTERNAL MEDICINE | Facility: CLINIC | Age: 77
End: 2022-04-13
Payer: MEDICARE

## 2022-04-13 VITALS
HEIGHT: 62 IN | TEMPERATURE: 98 F | BODY MASS INDEX: 23.39 KG/M2 | HEART RATE: 102 BPM | SYSTOLIC BLOOD PRESSURE: 99 MMHG | DIASTOLIC BLOOD PRESSURE: 66 MMHG

## 2022-04-13 DIAGNOSIS — E11.9 TYPE 2 DIABETES MELLITUS WITHOUT COMPLICATION, WITHOUT LONG-TERM CURRENT USE OF INSULIN: ICD-10-CM

## 2022-04-13 DIAGNOSIS — M79.673 CHRONIC FOOT PAIN, UNSPECIFIED LATERALITY: ICD-10-CM

## 2022-04-13 DIAGNOSIS — R53.83 FATIGUE, UNSPECIFIED TYPE: Primary | ICD-10-CM

## 2022-04-13 DIAGNOSIS — G89.29 CHRONIC FOOT PAIN, UNSPECIFIED LATERALITY: ICD-10-CM

## 2022-04-13 DIAGNOSIS — R53.83 FATIGUE, UNSPECIFIED TYPE: ICD-10-CM

## 2022-04-13 DIAGNOSIS — R19.5 DARK STOOLS: ICD-10-CM

## 2022-04-13 DIAGNOSIS — E53.8 B12 DEFICIENCY: ICD-10-CM

## 2022-04-13 DIAGNOSIS — M20.41 HAMMERTOE OF RIGHT FOOT: ICD-10-CM

## 2022-04-13 LAB
ANION GAP SERPL CALC-SCNC: 9 MMOL/L (ref 8–16)
BUN SERPL-MCNC: 13 MG/DL (ref 8–23)
CALCIUM SERPL-MCNC: 10.6 MG/DL (ref 8.7–10.5)
CHLORIDE SERPL-SCNC: 99 MMOL/L (ref 95–110)
CO2 SERPL-SCNC: 28 MMOL/L (ref 23–29)
CREAT SERPL-MCNC: 0.8 MG/DL (ref 0.5–1.4)
ERYTHROCYTE [DISTWIDTH] IN BLOOD BY AUTOMATED COUNT: 12.9 % (ref 11.5–14.5)
EST. GFR  (AFRICAN AMERICAN): >60 ML/MIN/1.73 M^2
EST. GFR  (NON AFRICAN AMERICAN): >60 ML/MIN/1.73 M^2
ESTIMATED AVG GLUCOSE: 151 MG/DL (ref 68–131)
GLUCOSE SERPL-MCNC: 138 MG/DL (ref 70–110)
HBA1C MFR BLD: 6.9 % (ref 4–5.6)
HCT VFR BLD AUTO: 38.5 % (ref 37–48.5)
HGB BLD-MCNC: 12.5 G/DL (ref 12–16)
MCH RBC QN AUTO: 31.5 PG (ref 27–31)
MCHC RBC AUTO-ENTMCNC: 32.5 G/DL (ref 32–36)
MCV RBC AUTO: 97 FL (ref 82–98)
PLATELET # BLD AUTO: 76 K/UL (ref 150–450)
PMV BLD AUTO: 11.3 FL (ref 9.2–12.9)
POTASSIUM SERPL-SCNC: 4.8 MMOL/L (ref 3.5–5.1)
RBC # BLD AUTO: 3.97 M/UL (ref 4–5.4)
SODIUM SERPL-SCNC: 136 MMOL/L (ref 136–145)
WBC # BLD AUTO: 5.29 K/UL (ref 3.9–12.7)

## 2022-04-13 PROCEDURE — 99999 PR PBB SHADOW E&M-EST. PATIENT-LVL V: CPT | Mod: PBBFAC,,, | Performed by: INTERNAL MEDICINE

## 2022-04-13 PROCEDURE — 80048 BASIC METABOLIC PNL TOTAL CA: CPT | Performed by: INTERNAL MEDICINE

## 2022-04-13 PROCEDURE — 99999 PR PBB SHADOW E&M-EST. PATIENT-LVL V: ICD-10-PCS | Mod: PBBFAC,,, | Performed by: INTERNAL MEDICINE

## 2022-04-13 PROCEDURE — 99213 OFFICE O/P EST LOW 20 MIN: CPT | Mod: S$PBB,,, | Performed by: INTERNAL MEDICINE

## 2022-04-13 PROCEDURE — 36415 COLL VENOUS BLD VENIPUNCTURE: CPT | Performed by: INTERNAL MEDICINE

## 2022-04-13 PROCEDURE — 85027 COMPLETE CBC AUTOMATED: CPT | Performed by: INTERNAL MEDICINE

## 2022-04-13 PROCEDURE — 83036 HEMOGLOBIN GLYCOSYLATED A1C: CPT | Performed by: INTERNAL MEDICINE

## 2022-04-13 PROCEDURE — 99213 PR OFFICE/OUTPT VISIT, EST, LEVL III, 20-29 MIN: ICD-10-PCS | Mod: S$PBB,,, | Performed by: INTERNAL MEDICINE

## 2022-04-13 PROCEDURE — 99215 OFFICE O/P EST HI 40 MIN: CPT | Mod: PBBFAC | Performed by: INTERNAL MEDICINE

## 2022-04-13 RX ORDER — CYANOCOBALAMIN 1000 UG/ML
1000 INJECTION, SOLUTION INTRAMUSCULAR; SUBCUTANEOUS ONCE
Qty: 1 ML | Refills: 0 | Status: SHIPPED | OUTPATIENT
Start: 2022-04-13 | End: 2022-04-14

## 2022-04-13 NOTE — PROGRESS NOTES
Subjective:       Patient ID: Anayeli Judd is a 77 y.o. female.    Chief Complaint: Diarrhea, fatigue    HPI:  Notes fatigue. Main reason for f/u was  contacted me yesterday since Mrs. Guadalupe had mentioned coffee ground type stools. She notes this has been chronic for several months now. Follows with GI and planning for upper and lower scope in a few weeks. No cp or sob. No bm yet today but had been having about 4 per day. Was recently on 2 courses of abx for UTI which is resolved now. Was on Augmentin then Ceftin. Baseline on daily Miralax for recurrent sbo 2/2 adhesions. Has reduced to qod.  Notes received B12 injections with previous pcp.  C/O intermittent pains of feet, right > left. Around 2nd to 4th toes.    Review of Systems   Constitutional: Positive for fatigue. Negative for chills and fever.   Respiratory: Negative for cough and shortness of breath.    Cardiovascular: Negative for chest pain and leg swelling.   Gastrointestinal: Positive for diarrhea. Negative for abdominal distention, abdominal pain, anal bleeding, constipation, nausea and vomiting.   Genitourinary: Negative for dysuria, frequency and urgency.   Neurological: Negative for syncope and light-headedness.       Past Medical History:   Diagnosis Date    Abdominal pain     Allergic rhinitis     Arthritis     Blood platelet disorder     Blood platelet disorder     Blood transfusion     during delivery and     Bowel obstruction     Cervical radiculopathy     followed by dr cloud    Colon cancer     transverse colon; resected; Stage IIA (pT3 pN0 MX)    Diabetes mellitus     Diarrhea     Falls 2020    Family history of breast cancer     Family history of colon cancer     Fatty liver     GERD (gastroesophageal reflux disease)     History of shingles     Hyperlipidemia     Hypothyroidism     Irritable bowel syndrome     Microscopic colitis     treated     Myelodysplastic syndrome     Raynaud  phenomenon     Raynaud's disease     Type 2 diabetes mellitus          Current Outpatient Medications:     acetaminophen (TYLENOL) 650 MG TbSR, Take 650 mg by mouth once as needed (pain)., Disp: , Rfl:     ascorbic acid, vitamin C, (VITAMIN C) 1000 MG tablet, Take 1,000 mg by mouth once daily., Disp: , Rfl:     atorvastatin (LIPITOR) 40 MG tablet, TAKE 1 TABLET BY MOUTH  DAILY, Disp: 90 tablet, Rfl: 3    azelastine (ASTELIN) 137 mcg (0.1 %) nasal spray, 2 sprays (274 mcg total) by Nasal route 2 (two) times daily as needed for Rhinitis., Disp: , Rfl:     biotin 10,000 mcg TbDL, Take 1 tablet by mouth once daily. , Disp: , Rfl:     calcium-vitamin D3 (OS-KASSANDRA 500 + D3) 500 mg-5 mcg (200 unit) per tablet, Take 1,200 tablets by mouth once daily., Disp: , Rfl:     cefUROXime (CEFTIN) 500 MG tablet, Take 1 tablet (500 mg total) by mouth every 12 (twelve) hours., Disp: 10 tablet, Rfl: 0    co-enzyme Q-10 30 mg capsule, Take 30 mg by mouth 3 (three) times daily., Disp: , Rfl:     conjugated estrogens (PREMARIN) vaginal cream, INSERT 1/2 GRAM VAGINALLY  TWICE WEEKLY, Disp: 30 g, Rfl: 3    cranberry extract 200 mg Cap, Take 1 capsule by mouth once daily., Disp: , Rfl:     D-MANNOSE ORAL, Take 1 tablet by mouth once daily. , Disp: , Rfl:     diclofenac sodium (VOLTAREN) 1 % Gel, Apply 2 g topically 4 (four) times daily as needed (Back pain)., Disp: 350 g, Rfl: 2    DULoxetine (CYMBALTA) 30 MG capsule, Take 1 capsule (30 mg total) by mouth 2 (two) times daily., Disp: 180 capsule, Rfl: 3    famotidine (PEPCID) 20 MG tablet, Take 1 tablet (20 mg total) by mouth 2 (two) times daily as needed for Heartburn., Disp: 60 tablet, Rfl: 2    flash glucose sensor (FREESTYLE EFREN 14 DAY SENSOR) Kit, 1 application by Misc.(Non-Drug; Combo Route) route every 14 (fourteen) days. Disp 30 or 90 day refill, Disp: 6 kit, Rfl: 6    fluticasone propionate (FLONASE) 50 mcg/actuation nasal spray, 1 spray by Each Nostril route daily  as needed for Rhinitis., Disp: , Rfl:     FREESTYLE EFREN 10 DAY READER Misc, TEST as directed, Disp: 3 each, Rfl: 3    FREESTYLE LITE STRIPS Strp, TEST DAILY AS DIRECTED, Disp: 50 strip, Rfl: 2    gabapentin (NEURONTIN) 600 MG tablet, Take 1 tablet (600 mg total) by mouth 2 (two) times daily., Disp: 180 tablet, Rfl: 3    hydrocortisone 2.5 % cream, Apply topically 2 (two) times daily as needed., Disp: 1 each, Rfl: 1    hyoscyamine (ANASPAZ,LEVSIN) 0.125 mg Tab, Take 1 tablet (125 mcg total) by mouth every 8 (eight) hours as needed (urgency)., Disp: 90 tablet, Rfl: 3    levothyroxine (SYNTHROID) 75 MCG tablet, Take 1 tablet (75 mcg total) by mouth before breakfast., Disp: 90 tablet, Rfl: 3    LIDOcaine (LIDODERM) 5 %, Place 1 patch onto the skin once daily. Remove & Discard patch within 12 hours or as directed by MD, Disp: 30 patch, Rfl: 0    magnesium 250 mg Tab, Take 1,000 mg by mouth once daily., Disp: , Rfl:     metFORMIN (GLUCOPHAGE) 500 MG tablet, Take 500 mg by mouth 2 (two) times daily with meals., Disp: , Rfl:     montelukast (SINGULAIR) 10 mg tablet, TAKE 1 TABLET BY MOUTH  DAILY, Disp: 90 tablet, Rfl: 3    ondansetron (ZOFRAN-ODT) 8 MG TbDL, Take 1 tablet (8 mg total) by mouth every 8 (eight) hours as needed (nausea)., Disp: 30 tablet, Rfl: 0    pantoprazole (PROTONIX) 20 MG tablet, TAKE 1 TABLET(20 MG) BY MOUTH EVERY DAY, Disp: 30 tablet, Rfl: 2    vitamin D 1000 units Tab, Take 1,000 Units by mouth once daily. D3, Disp: , Rfl:     cyanocobalamin 1,000 mcg/mL injection, Inject 1 mL (1,000 mcg total) into the muscle once. for 1 dose, Disp: 1 mL, Rfl: 0    polyethylene glycol (GLYCOLAX) 17 gram/dose powder, Take 17 g by mouth before dinner. (Patient not taking: Reported on 4/13/2022), Disp: 1530 g, Rfl: 1    senna-docusate 8.6-50 mg (PERICOLACE) 8.6-50 mg per tablet, Take 1 tablet by mouth 2 (two) times daily as needed for Constipation. (Patient not taking: Reported on 4/13/2022), Disp: ,  Rfl:     Past Surgical History:   Procedure Laterality Date    ADENOIDECTOMY      APPENDECTOMY      BACK SURGERY      CARPAL TUNNEL RELEASE      bilateral      SECTION      CHOLECYSTECTOMY  1965    COLECTOMY  2011    Transverse colon resection by Dr. Aguirre    COLONOSCOPY N/A 2017    Procedure: COLONOSCOPY;  Surgeon: Manjit Alvarez MD;  Location: [facility] (4TH FLR);  Service: Endoscopy;  Laterality: N/A;    COLONOSCOPY N/A 2019    Procedure: COLONOSCOPY;  Surgeon: Ramiro Jefferson MD;  Location: [facility] (4TH FLR);  Service: Endoscopy;  Laterality: N/A;  PM prep    CYSTOSCOPY N/A 2021    Procedure: CYSTOSCOPY;  Surgeon: Viridiana Valenzuela MD;  Location: [facility] OR 1ST FLR;  Service: Urology;  Laterality: N/A;    ENDOSCOPIC ULTRASOUND OF UPPER GASTROINTESTINAL TRACT N/A 2018    Procedure: ULTRASOUND, UPPER GI TRACT, ENDOSCOPIC;  Surgeon: Jose Hess MD;  Location: [facility];  Service: Endoscopy;  Laterality: N/A;    ENDOSCOPIC ULTRASOUND OF UPPER GASTROINTESTINAL TRACT N/A 2019    Procedure: ULTRASOUND, UPPER GI TRACT, ENDOSCOPIC;  Surgeon: Jose Hess MD;  Location: [facility] (2ND FLR);  Service: Endoscopy;  Laterality: N/A;    ESOPHAGOGASTRODUODENOSCOPY N/A 2018    Procedure: EGD (ESOPHAGOGASTRODUODENOSCOPY);  Surgeon: Angelo Reynolds MD;  Location: [facility] (2ND FLR);  Service: Endoscopy;  Laterality: N/A;    ESOPHAGOGASTRODUODENOSCOPY N/A 2019    Procedure: EGD (ESOPHAGOGASTRODUODENOSCOPY);  Surgeon: Ramiro Jefferson MD;  Location: [facility] (4TH FLR);  Service: Endoscopy;  Laterality: N/A;    EYE SURGERY      Cataract Removal    FLUOROSCOPIC URODYNAMIC STUDY N/A 2021    Procedure: URODYNAMIC STUDY, FLUOROSCOPIC;  Surgeon: Viridiana Valenzuela MD;  Location: [facility] OR 1ST FLR;  Service: Urology;  Laterality: N/A;  90 minutes     HYSTERECTOMY      JOINT REPLACEMENT      posterolateral fusion with autograft bone and [place]  mineralized bone matrix  02/01/2013    at [facility] for lumbar spine stenosis    SMALL INTESTINE SURGERY      SPINE SURGERY      TOE SURGERY      TONSILLECTOMY      TRIGGER FINGER RELEASE         Social History     Tobacco Use    Smoking status: Never Smoker    Smokeless tobacco: Never Used   Substance Use Topics    Alcohol use: Not Currently     Alcohol/week: 3.0 standard drinks     Types: 3 Glasses of wine per week     Comment: socially, hardly ever    Drug use: Never         Objective:      Vitals:    04/13/22 0922   BP: 99/66   Pulse: 102   Temp: 97.9 °F (36.6 °C)       Physical Exam  Constitutional:       General: She is not in acute distress.     Appearance: Normal appearance. She is normal weight. She is not ill-appearing, toxic-appearing or diaphoretic.   Eyes:      Extraocular Movements: Extraocular movements intact.      Conjunctiva/sclera: Conjunctivae normal.   Cardiovascular:      Rate and Rhythm: Normal rate and regular rhythm.   Pulmonary:      Effort: Pulmonary effort is normal. No respiratory distress.      Breath sounds: Normal breath sounds.   Abdominal:      General: Abdomen is flat. There is no distension.      Palpations: Abdomen is soft. There is no mass.      Tenderness: There is no abdominal tenderness. There is no guarding or rebound.      Hernia: No hernia is present.   Musculoskeletal:         General: Deformity (Hammertoe deformity of right 2nd to 4th digits.) present.      Right lower leg: No edema.      Left lower leg: No edema.   Skin:     Findings: No rash.   Neurological:      General: No focal deficit present.      Mental Status: She is alert and oriented to person, place, and time.      Gait: Gait normal.   Psychiatric:         Mood and Affect: Mood normal.         Behavior: Behavior normal.         Thought Content: Thought content normal.         Judgment: Judgment normal.           Assessment/Plan:     1) Diarrhea - On Miralax for recurrent sbo hx, has reduced to qod.  Recently restarted Metformin 500 bid.  2) Fatigue  3) Foot pains - Distal, right > left. Hammertoe deformities notably on right 2nd to 4th digits.  4) T2DM - Restarted Metformin 500 bid previously for uncontrolled bg. Occasional diet related elevations above 200 at home but generally better controlled with no levels 300 or above now.    - Noted chronic coffee ground appearance to stools. Will check cbc and bmp, though I have low suspicion for any acute changes. GI following, planning for upper and lower endoscopy in a few weeks. Will continue to titrate Miralax. Suspect abx at least a part of recent diarrhea, improving today, doubt C. Diff.  - Podiatry referral.  - Repeating a1c.  - B12 1000 mcg IM today. (Notes energy improvements with this in past.)    Will continue close f/u. Has our direct cell contact if any concerning changes.

## 2022-04-27 ENCOUNTER — OUTPATIENT CASE MANAGEMENT (OUTPATIENT)
Dept: ADMINISTRATIVE | Facility: OTHER | Age: 77
End: 2022-04-27
Payer: MEDICARE

## 2022-04-27 DIAGNOSIS — E11.9 DIABETES MELLITUS, TYPE 2: ICD-10-CM

## 2022-04-27 DIAGNOSIS — E11.9 TYPE 2 DIABETES MELLITUS WITHOUT COMPLICATION, WITHOUT LONG-TERM CURRENT USE OF INSULIN: Primary | ICD-10-CM

## 2022-04-27 RX ORDER — FLASH GLUCOSE SCANNING READER
EACH MISCELLANEOUS
Qty: 1 EACH | Refills: 0 | Status: SHIPPED | OUTPATIENT
Start: 2022-04-27 | End: 2023-11-30 | Stop reason: SDUPTHER

## 2022-04-27 RX ORDER — FLASH GLUCOSE SENSOR
1 KIT MISCELLANEOUS
Qty: 6 KIT | Refills: 3 | Status: SHIPPED | OUTPATIENT
Start: 2022-04-27 | End: 2022-04-28 | Stop reason: SDUPTHER

## 2022-04-27 NOTE — PROGRESS NOTES
Outpatient Care Management  Plan of Care Follow Up Visit    Patient: Anayeli Judd  MRN: [id number]  Date of Service: 04/27/2022  Completed by: Shanthi Whitaker RN  Referral Date: 03/04/2022  Program:     Reason for Visit   Patient presents with    OPCM Chart Review     4/27/2022       Brief Summary:   Patient states she had her granddaughter's wedding this past weekend and stayed at the [facility] all weekend for wedding festivities.  She states she did good with selecting food considering it was a wedding.  She states her blood sugars got no higher than the 160's during the wedding festivities and it usually runs about 120-130's at home.  She had grilled chicken with green beans for her wedding meal and tried using the plate method whenever possible for buffets.  She would ask what the food was before eating it to be able to make good decisions about food.  She has not been able to find the [employer] Zero Sugar, states her caregivers have searched for it everywhere.  She will try to see if she can get it online.  She saw Dr. Guthrie in the past two weeks for the coffee ground diarrhea she was having.  Labs were negative for any problems.  Patient is taking her probiotics as prescribed.  Patient is using her cane now to ambulate around the house for safety.  She states she has not had any falls over the past two weeks.  She went to her granddaughter's wedding this past weekend and she used a wheelchair to get around with a family member pushing her.  She states the wheelchair provided great convenience in getting from one place to another.        CM reviewed reading labels with patient.  How to determine the serving size and how to calculate appropriate carbs when eating.  CM advised patient to stay between 100-120 carbs per day with meals being 30-40 carbs and snacks being <20 gms of carbs.  CM discussed with patient the importance of continuing to use her cane when ambulating throughout her home for stability to  help prevent falls. CM discussed using a wheelchair or scooter whenever she goes out in public or to a store.    Next Steps:   Follow up in two week  Follow up on use of DME (cane, walker) at home and WC in public  Follow up on [employer] zero sugar  Follow up on balancing carbs using portion control and using food labels to count      Follow up in about 2 weeks (around 5/11/2022).    Patient Summary     Involvement of Care:  Do I have permission to speak with other family members about your care?   Yes    Patient Reported Labs & Vitals:No  1.  Any Patient Reported Labs & Vitals?     2.  Patient Reported Blood Pressure:     3.  Patient Reported Pulse:     4.  Patient Reported Weight (Kg):     5.  Patient Reported Blood Glucose (mg/dl):       Medical and social history was reviewed with patient and/or caregiver.     Clinical Assessment     Reviewed and provided basic information on available community resources for mental health, transportation, wellness resources, and palliative care programs with patient and/or caregiver.     Complex Care Plan     Care plan was discussed and completed today with input from patient and/or caregiver.    Patient Instructions     Instructions were provided via the [email or web address] patient resources and are available for the patient to view on the patient portal.    Todays OPCM Self-Management Care Plan was developed with the patients/caregivers input and was based on identified barriers from todays assessment.  Goals were written today with the patient/caregiver and the patient has agreed to work towards these goals to improve his/her overall well-being. Patient verbalized understanding of the care plan, goals, and all of today's instructions. Encouraged patient/caregiver to communicate with his/her physician and health care team about health conditions and the treatment plan.  Provided my contact information today and encouraged patient/caregiver to call me with any questions as needed.

## 2022-04-28 DIAGNOSIS — E11.9 TYPE 2 DIABETES MELLITUS WITHOUT COMPLICATION, WITHOUT LONG-TERM CURRENT USE OF INSULIN: ICD-10-CM

## 2022-04-28 RX ORDER — FLASH GLUCOSE SENSOR
1 KIT MISCELLANEOUS
Qty: 6 KIT | Refills: 3 | Status: SHIPPED | OUTPATIENT
Start: 2022-04-28 | End: 2023-05-08 | Stop reason: SDUPTHER

## 2022-05-01 DIAGNOSIS — N39.41 URGE INCONTINENCE: ICD-10-CM

## 2022-05-02 RX ORDER — HYOSCYAMINE SULFATE 0.125 MG
TABLET ORAL
Qty: 270 TABLET | Refills: 3 | Status: SHIPPED | OUTPATIENT
Start: 2022-05-02 | End: 2022-05-02 | Stop reason: SDUPTHER

## 2022-05-02 RX ORDER — HYOSCYAMINE SULFATE 0.125 MG
TABLET ORAL
Qty: 270 TABLET | Refills: 3 | Status: SHIPPED | OUTPATIENT
Start: 2022-05-02 | End: 2022-06-11

## 2022-05-03 ENCOUNTER — ANESTHESIA EVENT (OUTPATIENT)
Dept: ENDOSCOPY | Facility: HOSPITAL | Age: 77
End: 2022-05-03
Payer: MEDICARE

## 2022-05-04 ENCOUNTER — ANESTHESIA (OUTPATIENT)
Dept: ENDOSCOPY | Facility: HOSPITAL | Age: 77
End: 2022-05-04
Payer: MEDICARE

## 2022-05-04 ENCOUNTER — HOSPITAL ENCOUNTER (OUTPATIENT)
Facility: HOSPITAL | Age: 77
Discharge: HOME | End: 2022-05-04
Attending: INTERNAL MEDICINE | Admitting: INTERNAL MEDICINE
Payer: MEDICARE

## 2022-05-04 VITALS
BODY MASS INDEX: 22.63 KG/M2 | OXYGEN SATURATION: 99 % | HEIGHT: 62 IN | SYSTOLIC BLOOD PRESSURE: 130 MMHG | TEMPERATURE: 98 F | RESPIRATION RATE: 23 BRPM | HEART RATE: 77 BPM | WEIGHT: 123 LBS | DIASTOLIC BLOOD PRESSURE: 72 MMHG

## 2022-05-04 DIAGNOSIS — E61.1 IRON DEFICIENCY: ICD-10-CM

## 2022-05-04 PROCEDURE — 37000009 HC ANESTHESIA EA ADD 15 MINS: Performed by: INTERNAL MEDICINE

## 2022-05-04 PROCEDURE — 27201089 HC SNARE, DISP (ANY): Performed by: INTERNAL MEDICINE

## 2022-05-04 PROCEDURE — 45385 COLONOSCOPY W/LESION REMOVAL: CPT | Mod: ,,, | Performed by: INTERNAL MEDICINE

## 2022-05-04 PROCEDURE — 25000003 PHARM REV CODE 250: Performed by: INTERNAL MEDICINE

## 2022-05-04 PROCEDURE — 45385 COLONOSCOPY W/LESION REMOVAL: CPT | Performed by: INTERNAL MEDICINE

## 2022-05-04 PROCEDURE — 88305 TISSUE EXAM BY PATHOLOGIST: CPT | Mod: 26,,, | Performed by: PATHOLOGY

## 2022-05-04 PROCEDURE — 63600175 PHARM REV CODE 636 W HCPCS: Performed by: NURSE ANESTHETIST, CERTIFIED REGISTERED

## 2022-05-04 PROCEDURE — D9220A PRA ANESTHESIA: Mod: CRNA,,, | Performed by: NURSE ANESTHETIST, CERTIFIED REGISTERED

## 2022-05-04 PROCEDURE — 43239 PR EGD, FLEX, W/BIOPSY, SGL/MULTI: ICD-10-PCS | Mod: 51,,, | Performed by: INTERNAL MEDICINE

## 2022-05-04 PROCEDURE — 88305 TISSUE EXAM BY PATHOLOGIST: ICD-10-PCS | Mod: 26,,, | Performed by: PATHOLOGY

## 2022-05-04 PROCEDURE — 88342 CHG IMMUNOCYTOCHEMISTRY: ICD-10-PCS | Mod: 26,,, | Performed by: PATHOLOGY

## 2022-05-04 PROCEDURE — 43239 EGD BIOPSY SINGLE/MULTIPLE: CPT | Mod: 51,,, | Performed by: INTERNAL MEDICINE

## 2022-05-04 PROCEDURE — 88342 IMHCHEM/IMCYTCHM 1ST ANTB: CPT | Performed by: PATHOLOGY

## 2022-05-04 PROCEDURE — 88305 TISSUE EXAM BY PATHOLOGIST: CPT | Performed by: PATHOLOGY

## 2022-05-04 PROCEDURE — 37000008 HC ANESTHESIA 1ST 15 MINUTES: Performed by: INTERNAL MEDICINE

## 2022-05-04 PROCEDURE — 45385 PR COLONOSCOPY,REMV LESN,SNARE: ICD-10-PCS | Mod: ,,, | Performed by: INTERNAL MEDICINE

## 2022-05-04 PROCEDURE — D9220A PRA ANESTHESIA: Mod: ANES,,, | Performed by: STUDENT IN AN ORGANIZED HEALTH CARE EDUCATION/TRAINING PROGRAM

## 2022-05-04 PROCEDURE — 27201012 HC FORCEPS, HOT/COLD, DISP: Performed by: INTERNAL MEDICINE

## 2022-05-04 PROCEDURE — 88342 IMHCHEM/IMCYTCHM 1ST ANTB: CPT | Mod: 26,,, | Performed by: PATHOLOGY

## 2022-05-04 PROCEDURE — 43239 EGD BIOPSY SINGLE/MULTIPLE: CPT | Performed by: INTERNAL MEDICINE

## 2022-05-04 PROCEDURE — D9220A PRA ANESTHESIA: ICD-10-PCS | Mod: CRNA,,, | Performed by: NURSE ANESTHETIST, CERTIFIED REGISTERED

## 2022-05-04 PROCEDURE — D9220A PRA ANESTHESIA: ICD-10-PCS | Mod: ANES,,, | Performed by: STUDENT IN AN ORGANIZED HEALTH CARE EDUCATION/TRAINING PROGRAM

## 2022-05-04 PROCEDURE — 25000003 PHARM REV CODE 250: Performed by: NURSE ANESTHETIST, CERTIFIED REGISTERED

## 2022-05-04 RX ORDER — PROPOFOL 10 MG/ML
VIAL (ML) INTRAVENOUS CONTINUOUS PRN
Status: DISCONTINUED | OUTPATIENT
Start: 2022-05-04 | End: 2022-05-04

## 2022-05-04 RX ORDER — SODIUM CHLORIDE 9 MG/ML
INJECTION, SOLUTION INTRAVENOUS CONTINUOUS
Status: DISCONTINUED | OUTPATIENT
Start: 2022-05-04 | End: 2022-05-04 | Stop reason: HOSPADM

## 2022-05-04 RX ORDER — FENTANYL CITRATE 50 UG/ML
INJECTION, SOLUTION INTRAMUSCULAR; INTRAVENOUS
Status: DISCONTINUED | OUTPATIENT
Start: 2022-05-04 | End: 2022-05-04

## 2022-05-04 RX ORDER — LIDOCAINE HYDROCHLORIDE 20 MG/ML
INJECTION INTRAVENOUS
Status: DISCONTINUED | OUTPATIENT
Start: 2022-05-04 | End: 2022-05-04

## 2022-05-04 RX ORDER — ONDANSETRON 2 MG/ML
4 INJECTION INTRAMUSCULAR; INTRAVENOUS DAILY PRN
Status: DISCONTINUED | OUTPATIENT
Start: 2022-05-04 | End: 2022-05-04 | Stop reason: HOSPADM

## 2022-05-04 RX ORDER — FENTANYL CITRATE 50 UG/ML
25 INJECTION, SOLUTION INTRAMUSCULAR; INTRAVENOUS EVERY 5 MIN PRN
Status: DISCONTINUED | OUTPATIENT
Start: 2022-05-04 | End: 2022-05-04 | Stop reason: HOSPADM

## 2022-05-04 RX ORDER — PROPOFOL 10 MG/ML
VIAL (ML) INTRAVENOUS
Status: DISCONTINUED | OUTPATIENT
Start: 2022-05-04 | End: 2022-05-04

## 2022-05-04 RX ADMIN — FENTANYL CITRATE 50 MCG: 50 INJECTION, SOLUTION INTRAMUSCULAR; INTRAVENOUS at 02:05

## 2022-05-04 RX ADMIN — PROPOFOL 70 MG: 10 INJECTION, EMULSION INTRAVENOUS at 02:05

## 2022-05-04 RX ADMIN — SODIUM CHLORIDE: 0.9 INJECTION, SOLUTION INTRAVENOUS at 02:05

## 2022-05-04 RX ADMIN — LIDOCAINE HYDROCHLORIDE 75 MG: 20 INJECTION, SOLUTION INTRAVENOUS at 02:05

## 2022-05-04 RX ADMIN — Medication 150 MCG/KG/MIN: at 02:05

## 2022-05-04 NOTE — PROVATION PATIENT INSTRUCTIONS
Discharge Summary/Instructions after an Endoscopic Procedure  Patient Name: Anayeli Judd  Patient MRN: [id number]  Patient YOB: 1945     Wednesday, May 4, 2022  Ramiro Jefferson MD  Dear patient,  As a result of recent federal legislation (The Federal Cures Act), you may   receive lab or pathology results from your procedure in your [facility]   account before your physician is able to contact you. Your physician or   their representative will relay the results to you with their   recommendations at their soonest availability.  Thank you,  RESTRICTIONS:  During your procedure today, you received medications for sedation.  These   medications may affect your judgment, balance and coordination.  Therefore,   for 24 hours, you have the following restrictions:   - DO NOT drive a car, operate machinery, make legal/financial decisions,   sign important papers or drink alcohol.    ACTIVITY:  Today: no heavy lifting, straining or running due to procedural   sedation/anesthesia.  The following day: return to full activity including work.  DIET:  Eat and drink normally unless instructed otherwise.     TREATMENT FOR COMMON SIDE EFFECTS:  - Mild abdominal pain, nausea, belching, bloating or excessive gas:  rest,   eat lightly and use a heating pad.  - Sore Throat: treat with throat lozenges and/or gargle with warm salt   water.  - Because air was used during the procedure, expelling large amounts of air   from your rectum or belching is normal.  - If a bowel prep was taken, you may not have a bowel movement for 1-3 days.    This is normal.  SYMPTOMS TO WATCH FOR AND REPORT TO YOUR PHYSICIAN:  1. Abdominal pain or bloating, other than gas cramps.  2. Chest pain.  3. Back pain.  4. Signs of infection such as: chills or fever occurring within 24 hours   after the procedure.  5. Rectal bleeding, which would show as bright red, maroon, or black stools.   (A tablespoon of blood from the rectum is not serious, especially if    hemorrhoids are present.)  6. Vomiting.  7. Weakness or dizziness.  GO DIRECTLY TO THE NEAREST EMERGENCY ROOM IF YOU HAVE ANY OF THE FOLLOWING:      Difficulty breathing              Chills and/or fever over 101 F   Persistent vomiting and/or vomiting blood   Severe abdominal pain   Severe chest pain   Black, tarry stools   Bleeding- more than one tablespoon   Any other symptom or condition that you feel may need urgent attention  Your doctor recommends these additional instructions:  If any biopsies were taken, your doctors clinic will contact you in 1 to 2   weeks with any results.  - Discharge patient to home.   - Follow an antireflux regimen.   - Await pathology results.   - Telephone endoscopist for pathology results in 3 weeks.   - Return to primary care physician.   - The findings and recommendations were discussed with the patient.  For questions, problems or results please call your physician - Ramiro Jefferson MD at Work:  [phone].  [facility] [place] [facility], EMERGENCY ROOM PHONE NUMBER: [phone]  IF A COMPLICATION OR EMERGENCY SITUATION ARISES AND YOU ARE UNABLE TO REACH   YOUR PHYSICIAN - GO DIRECTLY TO THE EMERGENCY ROOM.  Ramiro Jefferson MD  5/4/2022 3:34:40 PM  This report has been verified and signed electronically.  Dear patient,  As a result of recent federal legislation (The Federal Cures Act), you may   receive lab or pathology results from your procedure in your [facility]   account before your physician is able to contact you. Your physician or   their representative will relay the results to you with their   recommendations at their soonest availability.  Thank you,  PROVATION

## 2022-05-04 NOTE — H&P
[facility] - Endoscopy  History & Physical    Subjective:      Chief Complaint/Reason for Admission:    EGD and colonoscopy    Anayeli Judd is a 77 y.o. female.    Past Medical History:   Diagnosis Date    Abdominal pain     Allergic rhinitis     Arthritis     Blood platelet disorder     Blood platelet disorder     Blood transfusion     during delivery and     Bowel obstruction     Cervical radiculopathy     followed by dr cloud    Colon cancer     transverse colon; resected; Stage IIA (pT3 pN0 MX)    Diabetes mellitus     Diarrhea     Falls 2020    Family history of breast cancer     Family history of colon cancer     Fatty liver     GERD (gastroesophageal reflux disease)     History of shingles     Hyperlipidemia     Hypothyroidism     Irritable bowel syndrome     Microscopic colitis     treated     Myelodysplastic syndrome     Raynaud phenomenon     Raynaud's disease     Type 2 diabetes mellitus      Past Surgical History:   Procedure Laterality Date    ADENOIDECTOMY      APPENDECTOMY      BACK SURGERY      CARPAL TUNNEL RELEASE      bilateral      SECTION      CHOLECYSTECTOMY  1965    COLECTOMY  2011    Transverse colon resection by Dr. Aguirre    COLONOSCOPY N/A 2017    Procedure: COLONOSCOPY;  Surgeon: Manjit Alvarez MD;  Location: [address]);  Service: Endoscopy;  Laterality: N/A;    COLONOSCOPY N/A 2019    Procedure: COLONOSCOPY;  Surgeon: Ramiro Jefferson MD;  Location: [address]);  Service: Endoscopy;  Laterality: N/A;  PM prep    CYSTOSCOPY N/A 2021    Procedure: CYSTOSCOPY;  Surgeon: Viridiana Valenzuela MD;  Location: [address];  Service: Urology;  Laterality: N/A;    ENDOSCOPIC ULTRASOUND OF UPPER GASTROINTESTINAL TRACT N/A 2018    Procedure: ULTRASOUND, UPPER GI TRACT, ENDOSCOPIC;  Surgeon: Jose Hess MD;  Location: [facility];  Service: Endoscopy;  Laterality: N/A;     ENDOSCOPIC ULTRASOUND OF UPPER GASTROINTESTINAL TRACT N/A 01/23/2019    Procedure: ULTRASOUND, UPPER GI TRACT, ENDOSCOPIC;  Surgeon: Jose Hess MD;  Location: [facility] ENDO (2ND FLR);  Service: Endoscopy;  Laterality: N/A;    ESOPHAGOGASTRODUODENOSCOPY N/A 11/16/2018    Procedure: EGD (ESOPHAGOGASTRODUODENOSCOPY);  Surgeon: Angelo Reynolds MD;  Location: [facility] ENDO (2ND FLR);  Service: Endoscopy;  Laterality: N/A;    ESOPHAGOGASTRODUODENOSCOPY N/A 06/26/2019    Procedure: EGD (ESOPHAGOGASTRODUODENOSCOPY);  Surgeon: Ramiro Jefferson MD;  Location: [facility] ENDO (4TH FLR);  Service: Endoscopy;  Laterality: N/A;    EYE SURGERY      Cataract Removal    FLUOROSCOPIC URODYNAMIC STUDY N/A 11/09/2021    Procedure: URODYNAMIC STUDY, FLUOROSCOPIC;  Surgeon: Viridiana Valenzuela MD;  Location: [facility] OR 1ST FLR;  Service: Urology;  Laterality: N/A;  90 minutes     HYSTERECTOMY      JOINT REPLACEMENT      posterolateral fusion with autograft bone and [place] mineralized bone matrix  02/01/2013    at [facility] for lumbar spine stenosis    SMALL INTESTINE SURGERY      SPINE SURGERY      TOE SURGERY      TONSILLECTOMY      TRIGGER FINGER RELEASE       Family History   Problem Relation Age of Onset    Heart disease Father 50        Mi age 50    Colon cancer Father     Bladder Cancer Mother         non smoker    Cataracts Mother     Glaucoma Mother     Heart disease Mother     Hyperlipidemia Mother     Kidney disease Mother     Breast cancer Sister 79    Arthritis Daughter     Asthma Daughter     Depression Daughter     Hypertension Daughter     Stroke Daughter 40    Arthritis Brother     Colon cancer Brother 70    Alcohol abuse Brother     Parkinsonism Brother     Alcohol abuse Brother     Depression Daughter     Stroke Daughter     Alcohol abuse Brother     Arthritis Brother     Asthma Daughter     Depression Daughter     Celiac disease Neg Hx     Cirrhosis Neg Hx     Colon polyps Neg Hx      Crohn's disease Neg Hx     Cystic fibrosis Neg Hx     Esophageal cancer Neg Hx     Hemochromatosis Neg Hx     Inflammatory bowel disease Neg Hx     Irritable bowel syndrome Neg Hx     Liver cancer Neg Hx     Liver disease Neg Hx     Rectal cancer Neg Hx     Stomach cancer Neg Hx     Ulcerative colitis Neg Hx     Eliazar's disease Neg Hx     Amblyopia Neg Hx     Blindness Neg Hx     Macular degeneration Neg Hx     Retinal detachment Neg Hx     Strabismus Neg Hx     Melanoma Neg Hx      Social History     Tobacco Use    Smoking status: Never Smoker    Smokeless tobacco: Never Used   Substance Use Topics    Alcohol use: Not Currently     Alcohol/week: 3.0 standard drinks     Types: 3 Glasses of wine per week     Comment: socially, hardly ever    Drug use: Never       PTA Medications   Medication Sig    acetaminophen (TYLENOL) 650 MG TbSR Take 650 mg by mouth once as needed (pain).    ascorbic acid, vitamin C, (VITAMIN C) 1000 MG tablet Take 1,000 mg by mouth once daily.    atorvastatin (LIPITOR) 40 MG tablet TAKE 1 TABLET BY MOUTH  DAILY    azelastine (ASTELIN) 137 mcg (0.1 %) nasal spray 2 sprays (274 mcg total) by Nasal route 2 (two) times daily as needed for Rhinitis.    biotin 10,000 mcg TbDL Take 1 tablet by mouth once daily.     calcium-vitamin D3 (OS-KASSANDRA 500 + D3) 500 mg-5 mcg (200 unit) per tablet Take 1,200 tablets by mouth once daily.    cefUROXime (CEFTIN) 500 MG tablet Take 1 tablet (500 mg total) by mouth every 12 (twelve) hours.    co-enzyme Q-10 30 mg capsule Take 30 mg by mouth 3 (three) times daily.    conjugated estrogens (PREMARIN) vaginal cream INSERT 1/2 GRAM VAGINALLY  TWICE WEEKLY    cranberry extract 200 mg Cap Take 1 capsule by mouth once daily.    D-MANNOSE ORAL Take 1 tablet by mouth once daily.     diclofenac sodium (VOLTAREN) 1 % Gel Apply 2 g topically 4 (four) times daily as needed (Back pain).    DULoxetine (CYMBALTA) 30 MG capsule Take 1 capsule (30 mg  total) by mouth 2 (two) times daily.    famotidine (PEPCID) 20 MG tablet Take 1 tablet (20 mg total) by mouth 2 (two) times daily as needed for Heartburn.    flash glucose scanning reader (FREESTYLE EFREN 14 DAY READER) Misc Check blood glucose level 3 times daily.    flash glucose sensor (FREESTYLE EFREN 14 DAY SENSOR) Kit 1 application by Misc.(Non-Drug; Combo Route) route every 14 (fourteen) days. Disp 30 or 90 day refill    fluticasone propionate (FLONASE) 50 mcg/actuation nasal spray 1 spray by Each Nostril route daily as needed for Rhinitis.    FREESTYLE LITE STRIPS Strp TEST DAILY AS DIRECTED    gabapentin (NEURONTIN) 600 MG tablet Take 1 tablet (600 mg total) by mouth 2 (two) times daily.    hydrocortisone 2.5 % cream Apply topically 2 (two) times daily as needed.    hyoscyamine (ANASPAZ,LEVSIN) 0.125 mg Tab TAKE 1 TABLET(125 MCG) BY MOUTH EVERY 8 HOURS AS NEEDED FOR URGENCY    levothyroxine (SYNTHROID) 75 MCG tablet Take 1 tablet (75 mcg total) by mouth before breakfast.    LIDOcaine (LIDODERM) 5 % Place 1 patch onto the skin once daily. Remove & Discard patch within 12 hours or as directed by MD    magnesium 250 mg Tab Take 1,000 mg by mouth once daily.    metFORMIN (GLUCOPHAGE) 500 MG tablet TAKE 1 TABLET(500 MG) BY MOUTH DAILY WITH BREAKFAST    montelukast (SINGULAIR) 10 mg tablet TAKE 1 TABLET BY MOUTH  DAILY    ondansetron (ZOFRAN-ODT) 8 MG TbDL Take 1 tablet (8 mg total) by mouth every 8 (eight) hours as needed (nausea).    pantoprazole (PROTONIX) 20 MG tablet TAKE 1 TABLET(20 MG) BY MOUTH EVERY DAY    polyethylene glycol (GLYCOLAX) 17 gram/dose powder Take 17 g by mouth before dinner. (Patient not taking: Reported on 4/13/2022)    senna-docusate 8.6-50 mg (PERICOLACE) 8.6-50 mg per tablet Take 1 tablet by mouth 2 (two) times daily as needed for Constipation. (Patient not taking: Reported on 4/13/2022)    vitamin D 1000 units Tab Take 1,000 Units by mouth once daily. D3     Review of  patient's allergies indicates:   Allergen Reactions    Codeine Itching and Nausea And Vomiting    Dilaudid [hydromorphone (bulk)] Other (See Comments)     Oversedating, head burning. Pt prefers to avoid.       Percocet [oxycodone-acetaminophen] Itching    Sulfa (sulfonamide antibiotics) Itching and Nausea And Vomiting           Latex, natural rubber Rash        Review of Systems   Constitutional: Negative for chills and fever.   Cardiovascular: Negative for chest pain.   Gastrointestinal: Negative for abdominal pain.       Objective:      Vital Signs (Most Recent)       Vital Signs Range (Last 24H):       Physical Exam  Constitutional:       Appearance: Normal appearance.   Pulmonary:      Effort: Pulmonary effort is normal.   Abdominal:      Palpations: Abdomen is soft.   Neurological:      Mental Status: She is alert and oriented to person, place, and time.           Assessment:      There are no hospital problems to display for this patient.      Plan:    EGD and colonoscopy for iron deficiency anemia evaluation.

## 2022-05-04 NOTE — ANESTHESIA POSTPROCEDURE EVALUATION
Anesthesia Post Evaluation    Patient: Anayeli Hardik Judd    Procedure(s) Performed: Procedure(s) (LRB):  EGD (ESOPHAGOGASTRODUODENOSCOPY) (N/A)  COLONOSCOPY (N/A)    Final Anesthesia Type: general      Patient location during evaluation: PACU  Patient participation: Yes- Able to Participate  Level of consciousness: awake and alert  Post-procedure vital signs: reviewed and stable  Pain management: adequate  Airway patency: patent  EL mitigation strategies: Multimodal analgesia  PONV status at discharge: No PONV  Anesthetic complications: no      Cardiovascular status: blood pressure returned to baseline and hemodynamically stable  Respiratory status: unassisted  Hydration status: euvolemic  Follow-up not needed.          Vitals Value Taken Time   /63 05/04/22 1546   Temp 36.7 °C (98 °F) 05/04/22 1541   Pulse 99 05/04/22 1549   Resp 16 05/04/22 1549   SpO2 100 % 05/04/22 1545   Vitals shown include unvalidated device data.      No case tracking events are documented in the log.      Pain/Roscoe Score: Roscoe Score: 10 (5/4/2022  3:45 PM)

## 2022-05-04 NOTE — ANESTHESIA PREPROCEDURE EVALUATION
05/04/2022  Pre-operative evaluation for Procedure(s) (LRB):  EGD (ESOPHAGOGASTRODUODENOSCOPY) (N/A)  COLONOSCOPY (N/A)    Anayeli Judd is a 77 y.o. female     Patient Active Problem List   Diagnosis    Lumbar spondylosis    Cervical spondylosis with radiculopathy    Insomnia    Carpal tunnel syndrome    Headache(784.0)    Hypothyroid    History of colon cancer    Irritable bowel syndrome with both constipation and diarrhea    Chronic sinusitis of right maxilla    Diarrhea    Pelvic muscle wasting    GERD (gastroesophageal reflux disease)    Fatty liver    Partial small bowel obstruction    Family history of breast cancer    Numbness of face    Gait instability    Type 2 diabetes mellitus without complication, without long-term current use of insulin    Acute deep vein thrombosis (DVT) of upper extremity    Wound infection after surgery    Hematoma    Chronic diarrhea    Fecal incontinence    Generalized abdominal pain    Nausea    Vitamin B12 deficiency    Vaginal atrophy    Incomplete emptying of bladder    Mixed stress and urge urinary incontinence    Vaginal irritation    Fecal soiling    Irregular bowel habits    Elevated serum creatinine    Hypomagnesemia    Hyponatremia    Hydronephrosis    Acute cystitis without hematuria    Prolapse of female genital organs    Rectocele    Iron deficiency anemia due to chronic blood loss    Postmenopausal bleeding    SBO (small bowel obstruction)    Thrombocytopenia    Myelodysplastic syndrome    Constipation    Bilious vomiting with nausea    Parkinsonism    Memory change    Other chest pain    Atrophic pancreas    Other hyperlipidemia    Type 2 diabetes mellitus with diabetic peripheral angiopathy without gangrene, without long-term current use of insulin    Abdominal discomfort    Anxiety    UTI (urinary  tract infection)    Elevated liver enzymes    Adhesion of abdominal wall    Peripheral edema       Review of patient's allergies indicates:   Allergen Reactions    Codeine Itching and Nausea And Vomiting    Dilaudid [hydromorphone (bulk)] Other (See Comments)     Oversedating, head burning. Pt prefers to avoid.       Percocet [oxycodone-acetaminophen] Itching    Sulfa (sulfonamide antibiotics) Itching and Nausea And Vomiting           Latex, natural rubber Rash       No current facility-administered medications on file prior to encounter.     Current Outpatient Medications on File Prior to Encounter   Medication Sig Dispense Refill    acetaminophen (TYLENOL) 650 MG TbSR Take 650 mg by mouth once as needed (pain).      ascorbic acid, vitamin C, (VITAMIN C) 1000 MG tablet Take 1,000 mg by mouth once daily.      atorvastatin (LIPITOR) 40 MG tablet TAKE 1 TABLET BY MOUTH  DAILY 90 tablet 3    azelastine (ASTELIN) 137 mcg (0.1 %) nasal spray 2 sprays (274 mcg total) by Nasal route 2 (two) times daily as needed for Rhinitis.      biotin 10,000 mcg TbDL Take 1 tablet by mouth once daily.       calcium-vitamin D3 (OS-KASSANDRA 500 + D3) 500 mg-5 mcg (200 unit) per tablet Take 1,200 tablets by mouth once daily.      co-enzyme Q-10 30 mg capsule Take 30 mg by mouth 3 (three) times daily.      conjugated estrogens (PREMARIN) vaginal cream INSERT 1/2 GRAM VAGINALLY  TWICE WEEKLY 30 g 3    cranberry extract 200 mg Cap Take 1 capsule by mouth once daily.      D-MANNOSE ORAL Take 1 tablet by mouth once daily.       DULoxetine (CYMBALTA) 30 MG capsule Take 1 capsule (30 mg total) by mouth 2 (two) times daily. 180 capsule 3    fluticasone propionate (FLONASE) 50 mcg/actuation nasal spray 1 spray by Each Nostril route daily as needed for Rhinitis.      FREESTYLE LITE STRIPS Strp TEST DAILY AS DIRECTED 50 strip 2    gabapentin (NEURONTIN) 600 MG tablet Take 1 tablet (600 mg total) by mouth 2 (two) times daily. 180  tablet 3    hydrocortisone 2.5 % cream Apply topically 2 (two) times daily as needed. 1 each 1    levothyroxine (SYNTHROID) 75 MCG tablet Take 1 tablet (75 mcg total) by mouth before breakfast. 90 tablet 3    LIDOcaine (LIDODERM) 5 % Place 1 patch onto the skin once daily. Remove & Discard patch within 12 hours or as directed by MD 30 patch 0    magnesium 250 mg Tab Take 1,000 mg by mouth once daily.      montelukast (SINGULAIR) 10 mg tablet TAKE 1 TABLET BY MOUTH  DAILY 90 tablet 3    ondansetron (ZOFRAN-ODT) 8 MG TbDL Take 1 tablet (8 mg total) by mouth every 8 (eight) hours as needed (nausea). 30 tablet 0    polyethylene glycol (GLYCOLAX) 17 gram/dose powder Take 17 g by mouth before dinner. (Patient not taking: Reported on 2022) 1530 g 1    senna-docusate 8.6-50 mg (PERICOLACE) 8.6-50 mg per tablet Take 1 tablet by mouth 2 (two) times daily as needed for Constipation. (Patient not taking: Reported on 2022)      vitamin D 1000 units Tab Take 1,000 Units by mouth once daily. D3         Past Surgical History:   Procedure Laterality Date    ADENOIDECTOMY      APPENDECTOMY      BACK SURGERY      CARPAL TUNNEL RELEASE      bilateral      SECTION      CHOLECYSTECTOMY  1965    COLECTOMY  2011    Transverse colon resection by Dr. Aguirre    COLONOSCOPY N/A 2017    Procedure: COLONOSCOPY;  Surgeon: Manjit Alvarez MD;  Location: [address]);  Service: Endoscopy;  Laterality: N/A;    COLONOSCOPY N/A 2019    Procedure: COLONOSCOPY;  Surgeon: Ramiro Jefferson MD;  Location: [address]);  Service: Endoscopy;  Laterality: N/A;  PM prep    CYSTOSCOPY N/A 2021    Procedure: CYSTOSCOPY;  Surgeon: Viridiana Valenzuela MD;  Location: [address];  Service: Urology;  Laterality: N/A;    ENDOSCOPIC ULTRASOUND OF UPPER GASTROINTESTINAL TRACT N/A 2018    Procedure: ULTRASOUND, UPPER GI TRACT, ENDOSCOPIC;  Surgeon: Jose Hess MD;  Location: [facility]  ENDO;  Service: Endoscopy;  Laterality: N/A;    ENDOSCOPIC ULTRASOUND OF UPPER GASTROINTESTINAL TRACT N/A 01/23/2019    Procedure: ULTRASOUND, UPPER GI TRACT, ENDOSCOPIC;  Surgeon: Jose Hess MD;  Location: [facility] ENDO (2ND FLR);  Service: Endoscopy;  Laterality: N/A;    ESOPHAGOGASTRODUODENOSCOPY N/A 11/16/2018    Procedure: EGD (ESOPHAGOGASTRODUODENOSCOPY);  Surgeon: Angelo Reynolds MD;  Location: [facility] ENDO (2ND FLR);  Service: Endoscopy;  Laterality: N/A;    ESOPHAGOGASTRODUODENOSCOPY N/A 06/26/2019    Procedure: EGD (ESOPHAGOGASTRODUODENOSCOPY);  Surgeon: Ramiro Jefferson MD;  Location: [facility] ENDO (4TH FLR);  Service: Endoscopy;  Laterality: N/A;    EYE SURGERY      Cataract Removal    FLUOROSCOPIC URODYNAMIC STUDY N/A 11/09/2021    Procedure: URODYNAMIC STUDY, FLUOROSCOPIC;  Surgeon: Viridiana Valenzuela MD;  Location: [facility] OR 1ST FLR;  Service: Urology;  Laterality: N/A;  90 minutes     HYSTERECTOMY      JOINT REPLACEMENT      posterolateral fusion with autograft bone and [place] mineralized bone matrix  02/01/2013    at [facility] for lumbar spine stenosis    SMALL INTESTINE SURGERY      SPINE SURGERY      TOE SURGERY      TONSILLECTOMY      TRIGGER FINGER RELEASE         Social History     Socioeconomic History    Marital status:    Tobacco Use    Smoking status: Never Smoker    Smokeless tobacco: Never Used   Substance and Sexual Activity    Alcohol use: Not Currently     Alcohol/week: 3.0 standard drinks     Types: 3 Glasses of wine per week     Comment: socially, hardly ever    Drug use: Never    Sexual activity: Not Currently   Social History Narrative    , lives alone.  Primary support are her children and friends.     Social Determinants of Health     Financial Resource Strain: Low Risk     Difficulty of Paying Living Expenses: Not hard at all   Food Insecurity: No Food Insecurity    Worried About Running Out of Food in the Last Year: Never true    Ran Out of Food in  the Last Year: Never true   Transportation Needs: No Transportation Needs    Lack of Transportation (Medical): No    Lack of Transportation (Non-Medical): No   Physical Activity: Inactive    Days of Exercise per Week: 0 days    Minutes of Exercise per Session: 0 min   Stress: No Stress Concern Present    Feeling of Stress : Not at all   Social Connections: Unknown    Frequency of Communication with Friends and Family: More than three times a week    Frequency of Social Gatherings with Friends and Family: More than three times a week    Marital Status:    Housing Stability: Low Risk     Unable to Pay for Housing in the Last Year: No    Number of Places Lived in the Last Year: 1    Unstable Housing in the Last Year: No         CBC: No results for input(s): WBC, RBC, HGB, HCT, PLT, MCV, MCH, MCHC in the last 72 hours.    CMP: No results for input(s): NA, K, CL, CO2, BUN, CREATININE, GLU, MG, PHOS, CALCIUM, ALBUMIN, PROT, ALKPHOS, ALT, AST, BILITOT in the last 72 hours.    INR  No results for input(s): PT, INR, PROTIME, APTT in the last 72 hours.        Diagnostic Studies:      EKD Echo:  No results found. However, due to the size of the patient record, not all encounters were searched. Please check Results Review for a complete set of results.      Pre-op Assessment    I have reviewed the Patient Summary Reports.     I have reviewed the Nursing Notes. I have reviewed the NPO Status.   I have reviewed the Medications.     Review of Systems  Renal/:   Chronic Renal Disease    Hepatic/GI:   GERD    Endocrine:   Diabetes Hypothyroidism        Physical Exam  General: Well nourished and Cooperative    Airway:  Mallampati: II   Mouth Opening: Normal  TM Distance: Normal  Tongue: Normal  Neck ROM: Normal ROM    Chest/Lungs:  Clear to auscultation, Normal Respiratory Rate    Heart:  Rate: Normal  Rhythm: Regular Rhythm  Sounds: Normal        Anesthesia Plan  Type of Anesthesia, risks & benefits  discussed:    Anesthesia Type: Gen Natural Airway  Intra-op Monitoring Plan: Standard ASA Monitors  Post Op Pain Control Plan: multimodal analgesia and IV/PO Opioids PRN  Induction:  IV  Airway Plan: Direct and Video, Post-Induction  Informed Consent: Informed consent signed with the Patient and all parties understand the risks and agree with anesthesia plan.  All questions answered.   ASA Score: 3    Ready For Surgery From Anesthesia Perspective.     .

## 2022-05-04 NOTE — TRANSFER OF CARE
"Anesthesia Transfer of Care Note    Patient: Anayeli Judd    Procedure(s) Performed: Procedure(s) (LRB):  EGD (ESOPHAGOGASTRODUODENOSCOPY) (N/A)  COLONOSCOPY (N/A)    Patient location: [facility]    Anesthesia Type: general    Transport from OR: Transported from OR on room air with adequate spontaneous ventilation    Post pain: adequate analgesia    Post assessment: no apparent anesthetic complications and tolerated procedure well    Post vital signs: stable    Level of consciousness: awake, alert and oriented    Nausea/Vomiting: no nausea/vomiting    Complications: none    Transfer of care protocol was followed      Last vitals:   Visit Vitals  /62 (BP Location: Left arm, Patient Position: Lying)   Pulse 102   Temp 36.7 °C (98 °F) (Temporal)   Resp 17   Ht 5' 2" (1.575 m)   Wt 55.8 kg (123 lb)   LMP  (LMP Unknown)   SpO2 100%   Breastfeeding No   BMI 22.50 kg/m²     "

## 2022-05-04 NOTE — PROVATION PATIENT INSTRUCTIONS
Discharge Summary/Instructions after an Endoscopic Procedure  Patient Name: Anayeli Judd  Patient MRN: [id number]  Patient YOB: 1945     Wednesday, May 4, 2022  Ramiro Jefferson MD  Dear patient,  As a result of recent federal legislation (The Federal Cures Act), you may   receive lab or pathology results from your procedure in your [facility]   account before your physician is able to contact you. Your physician or   their representative will relay the results to you with their   recommendations at their soonest availability.  Thank you,  RESTRICTIONS:  During your procedure today, you received medications for sedation.  These   medications may affect your judgment, balance and coordination.  Therefore,   for 24 hours, you have the following restrictions:   - DO NOT drive a car, operate machinery, make legal/financial decisions,   sign important papers or drink alcohol.    ACTIVITY:  Today: no heavy lifting, straining or running due to procedural   sedation/anesthesia.  The following day: return to full activity including work.  DIET:  Eat and drink normally unless instructed otherwise.     TREATMENT FOR COMMON SIDE EFFECTS:  - Mild abdominal pain, nausea, belching, bloating or excessive gas:  rest,   eat lightly and use a heating pad.  - Sore Throat: treat with throat lozenges and/or gargle with warm salt   water.  - Because air was used during the procedure, expelling large amounts of air   from your rectum or belching is normal.  - If a bowel prep was taken, you may not have a bowel movement for 1-3 days.    This is normal.  SYMPTOMS TO WATCH FOR AND REPORT TO YOUR PHYSICIAN:  1. Abdominal pain or bloating, other than gas cramps.  2. Chest pain.  3. Back pain.  4. Signs of infection such as: chills or fever occurring within 24 hours   after the procedure.  5. Rectal bleeding, which would show as bright red, maroon, or black stools.   (A tablespoon of blood from the rectum is not serious, especially if    hemorrhoids are present.)  6. Vomiting.  7. Weakness or dizziness.  GO DIRECTLY TO THE NEAREST EMERGENCY ROOM IF YOU HAVE ANY OF THE FOLLOWING:      Difficulty breathing              Chills and/or fever over 101 F   Persistent vomiting and/or vomiting blood   Severe abdominal pain   Severe chest pain   Black, tarry stools   Bleeding- more than one tablespoon   Any other symptom or condition that you feel may need urgent attention  Your doctor recommends these additional instructions:  If any biopsies were taken, your doctors clinic will contact you in 1 to 2   weeks with any results.  - Discharge patient to home.   - Await pathology results.   - Telephone endoscopist for pathology results in 2 weeks.   - Repeat colonoscopy in 3 years for surveillance based on pathology results.     - Return to primary care physician.   - The findings and recommendations were discussed with the patient.  For questions, problems or results please call your physician - Ramiro Jefferson MD at Work:  [phone].  [facility] [place], EMERGENCY ROOM PHONE NUMBER: [phone]  IF A COMPLICATION OR EMERGENCY SITUATION ARISES AND YOU ARE UNABLE TO REACH   YOUR PHYSICIAN - GO DIRECTLY TO THE EMERGENCY ROOM.  Ramiro Jefferson MD  5/4/2022 3:34:06 PM  This report has been verified and signed electronically.  Dear patient,  As a result of recent federal legislation (The Federal Cures Act), you may   receive lab or pathology results from your procedure in your [facility]   account before your physician is able to contact you. Your physician or   their representative will relay the results to you with their   recommendations at their soonest availability.  Thank you,  PROVATION

## 2022-05-09 LAB — POCT GLUCOSE: 147 MG/DL (ref 70–110)

## 2022-05-12 ENCOUNTER — OUTPATIENT CASE MANAGEMENT (OUTPATIENT)
Dept: ADMINISTRATIVE | Facility: OTHER | Age: 77
End: 2022-05-12
Payer: MEDICARE

## 2022-05-12 ENCOUNTER — PATIENT MESSAGE (OUTPATIENT)
Dept: ADMINISTRATIVE | Facility: OTHER | Age: 77
End: 2022-05-12
Payer: MEDICARE

## 2022-05-12 NOTE — PROGRESS NOTES
Outpatient Care Management  Plan of Care Follow Up Visit    Patient: Anayeli Judd  MRN: [id number]  Date of Service: 05/12/2022  Completed by: Shanthi Whitaker RN  Referral Date: 03/04/2022  Program:     Reason for Visit   Patient presents with    OPCM Chart Review     5/12/2022    OPCM RN First Follow-Up Attempt     5/12/2022  No answer, CM left voicemail message.  CM also sent follow up attempt letter through portal.    Follow-up     5/12/2022    Update Plan Of Care     5/12/2022       Brief Summary: GLO spoke to patient, she states she has had a good couple of weeks.  For mother's day she went out with her children and had a cup of turtle soup and boiled shrimp.  She states yesterday her blood sugar was 110 and today it was 134 when she woke up.  The patient states her caregiver found the [employer] at [facility] and [facility], so she is now able to have her yogurt again for a snake.  She has a new caregiver, Kris Cummings [phone], that she says doesn't exactly know how to give her correct portions when she serves her dinner.  She states she gives her too much starch or too large of a piece of meat in her plate.  GLO was going to talk to the caregiver to educate on portion sizes, but she is off running errands.    CM reiterated to patient about the plate method so she can talk to her caregiver about it.  CM also reiterated about the size of the palm of her hand is about how much meat she should have.  CM tried to call Kris, no answer, will speak to her at a later time to educate her on plate method.    Next Steps:   Follow up in four week  Follow up on balancing carbs using portion control and using food labels to count  Follow up on using plate method  Follow up on calling Kris, the caregiver.    Follow up in about 4 weeks (around 6/9/2022).    Patient Summary     Involvement of Care:  Do I have permission to speak with other family members about your care?   Yes    Patient Reported Labs &  Vitals:  1.  Any Patient Reported Labs & Vitals?     2.  Patient Reported Blood Pressure:     3.  Patient Reported Pulse:     4.  Patient Reported Weight (Kg):     5.  Patient Reported Blood Glucose (mg/dl):   110-134    Medical and social history was reviewed with patient and/or caregiver.     Clinical Assessment     Reviewed and provided basic information on available community resources for mental health, transportation, wellness resources, and palliative care programs with patient and/or caregiver.     Complex Care Plan     Care plan was discussed and completed today with input from patient and/or caregiver.    Patient Instructions     Instructions were provided via the [facility] patient resources and are available for the patient to view on the patient portal.    Todays OPCM Self-Management Care Plan was developed with the patients/caregivers input and was based on identified barriers from todays assessment.  Goals were written today with the patient/caregiver and the patient has agreed to work towards these goals to improve his/her overall well-being. Patient verbalized understanding of the care plan, goals, and all of today's instructions. Encouraged patient/caregiver to communicate with his/her physician and health care team about health conditions and the treatment plan.  Provided my contact information today and encouraged patient/caregiver to call me with any questions as needed.

## 2022-05-13 LAB
FINAL PATHOLOGIC DIAGNOSIS: NORMAL
Lab: NORMAL

## 2022-05-14 NOTE — PROGRESS NOTES
Anayeli your EGD colonoscopy pathology was benign no evidence of celiac sprue no evidence of H pylori no dysplasia recommend you next surveillance colonoscopy in 3 years

## 2022-05-16 ENCOUNTER — PATIENT MESSAGE (OUTPATIENT)
Dept: INTERNAL MEDICINE | Facility: CLINIC | Age: 77
End: 2022-05-16
Payer: MEDICARE

## 2022-05-16 ENCOUNTER — TELEPHONE (OUTPATIENT)
Dept: INTERNAL MEDICINE | Facility: CLINIC | Age: 77
End: 2022-05-16
Payer: MEDICARE

## 2022-05-16 NOTE — TELEPHONE ENCOUNTER
----- Message from Abena Sam MA sent at 5/16/2022 10:24 AM CDT -----  Regarding: sinus headache

## 2022-05-16 NOTE — TELEPHONE ENCOUNTER
Phone call with daughter. Sinus headache past couple days. No fever or colored drainage noted. Not using nasal spray currently. Agreed on trying course of Flonase daily with Claritin or Zyrtec. Okay to use Tylenol 1000 mg bid prn ha.

## 2022-05-20 ENCOUNTER — HOSPITAL ENCOUNTER (OUTPATIENT)
Dept: RADIOLOGY | Facility: HOSPITAL | Age: 77
Discharge: HOME | End: 2022-05-20
Attending: PODIATRIST
Payer: MEDICARE

## 2022-05-20 ENCOUNTER — OFFICE VISIT (OUTPATIENT)
Dept: PODIATRY | Facility: CLINIC | Age: 77
End: 2022-05-20
Payer: MEDICARE

## 2022-05-20 VITALS
HEART RATE: 66 BPM | WEIGHT: 132.94 LBS | DIASTOLIC BLOOD PRESSURE: 67 MMHG | BODY MASS INDEX: 24.31 KG/M2 | SYSTOLIC BLOOD PRESSURE: 122 MMHG

## 2022-05-20 DIAGNOSIS — M79.673 CHRONIC FOOT PAIN, UNSPECIFIED LATERALITY: ICD-10-CM

## 2022-05-20 DIAGNOSIS — M20.41 HAMMERTOE OF RIGHT FOOT: ICD-10-CM

## 2022-05-20 DIAGNOSIS — G89.29 CHRONIC FOOT PAIN, UNSPECIFIED LATERALITY: ICD-10-CM

## 2022-05-20 DIAGNOSIS — E11.51 TYPE 2 DIABETES MELLITUS WITH DIABETIC PERIPHERAL ANGIOPATHY WITHOUT GANGRENE, WITHOUT LONG-TERM CURRENT USE OF INSULIN: ICD-10-CM

## 2022-05-20 DIAGNOSIS — R26.81 GAIT INSTABILITY: ICD-10-CM

## 2022-05-20 DIAGNOSIS — R26.81 GAIT INSTABILITY: Primary | ICD-10-CM

## 2022-05-20 PROCEDURE — 93925 LOWER EXTREMITY STUDY: CPT | Mod: TC

## 2022-05-20 PROCEDURE — 99999 PR PBB SHADOW E&M-EST. PATIENT-LVL V: ICD-10-PCS | Mod: PBBFAC,,, | Performed by: PODIATRIST

## 2022-05-20 PROCEDURE — 93925 US LOWER EXTREMITY ARTERIES BILATERAL: ICD-10-PCS | Mod: 26,,, | Performed by: STUDENT IN AN ORGANIZED HEALTH CARE EDUCATION/TRAINING PROGRAM

## 2022-05-20 PROCEDURE — 99999 PR PBB SHADOW E&M-EST. PATIENT-LVL V: CPT | Mod: PBBFAC,,, | Performed by: PODIATRIST

## 2022-05-20 PROCEDURE — 99215 OFFICE O/P EST HI 40 MIN: CPT | Mod: PBBFAC,25 | Performed by: PODIATRIST

## 2022-05-20 PROCEDURE — 93925 LOWER EXTREMITY STUDY: CPT | Mod: 26,,, | Performed by: STUDENT IN AN ORGANIZED HEALTH CARE EDUCATION/TRAINING PROGRAM

## 2022-05-20 PROCEDURE — 99203 OFFICE O/P NEW LOW 30 MIN: CPT | Mod: S$PBB,,, | Performed by: PODIATRIST

## 2022-05-20 PROCEDURE — 99203 PR OFFICE/OUTPT VISIT, NEW, LEVL III, 30-44 MIN: ICD-10-PCS | Mod: S$PBB,,, | Performed by: PODIATRIST

## 2022-05-20 NOTE — PROGRESS NOTES
PODIATRY NOTE     PATIENT ID:  Anayeli Judd is a 77 y.o. female.     CHIEF CONCERN:   Diabetes Mellitus (/Darci Guthrie MD PCP 04/13/2022   toes turn color when they dangle/) and Diabetic Foot Exam           MEDICAL DECISION MAKING:        ICD-10-CM ICD-9-CM    1. Gait instability  R26.81 781.2 US Lower Extremity Arteries Bilateral      EMG W/ ULTRASOUND AND NERVE CONDUCTION TEST 2 Extremities   2. Chronic foot pain, unspecified laterality  M79.673 729.5 Ambulatory referral/consult to Podiatry    G89.29 338.29    3. Hammertoe of right foot  M20.41 735.4 Ambulatory referral/consult to Podiatry   4. Type 2 diabetes mellitus with diabetic peripheral angiopathy without gangrene, without long-term current use of insulin  E11.51 250.70 US Lower Extremity Arteries Bilateral     443.81 EMG W/ ULTRASOUND AND NERVE CONDUCTION TEST 2 Extremities       · I counseled the patient on the patient's conditions, their implications and medical management.    · Foot examined.   No wounds.  Hammertoes, appear chronic.  No acute injuries.  · U/S Arteries lower extremities.   · EMG  Shoe and activity modification as needed for relief in the meantime.   Consider Neurop Away or alpha lipoic acid 600mg daily.  Available OTC.                     HISTORY OF PRESENT ILLNESS:  Anayeli Judd is a 77 y.o. female with concerns regarding: bilateral foot pain. Mainly around the toes, shoots up the legs.  Pain with weight bearing and non weight bearing.  She is wearing a pair of wedges that are comfortable for her.   Patient reports symptoms/signs have been present years.   Trauma/injury to the area:  None recalled.         Patient Active Problem List   Diagnosis    Lumbar spondylosis    Cervical spondylosis with radiculopathy    Insomnia    Carpal tunnel syndrome    Headache(784.0)    Hypothyroid    History of colon cancer    Irritable bowel syndrome with both constipation and diarrhea    Chronic sinusitis of right maxilla     Diarrhea    Pelvic muscle wasting    GERD (gastroesophageal reflux disease)    Fatty liver    Partial small bowel obstruction    Family history of breast cancer    Numbness of face    Gait instability    Type 2 diabetes mellitus without complication, without long-term current use of insulin    Acute deep vein thrombosis (DVT) of upper extremity    Wound infection after surgery    Hematoma    Chronic diarrhea    Fecal incontinence    Generalized abdominal pain    Nausea    Vitamin B12 deficiency    Vaginal atrophy    Incomplete emptying of bladder    Mixed stress and urge urinary incontinence    Vaginal irritation    Fecal soiling    Irregular bowel habits    Elevated serum creatinine    Hypomagnesemia    Hyponatremia    Hydronephrosis    Acute cystitis without hematuria    Prolapse of female genital organs    Rectocele    Iron deficiency anemia due to chronic blood loss    Postmenopausal bleeding    SBO (small bowel obstruction)    Thrombocytopenia    Myelodysplastic syndrome    Constipation    Bilious vomiting with nausea    Parkinsonism    Memory change    Other chest pain    Atrophic pancreas    Other hyperlipidemia    Type 2 diabetes mellitus with diabetic peripheral angiopathy without gangrene, without long-term current use of insulin    Abdominal discomfort    Anxiety    UTI (urinary tract infection)    Elevated liver enzymes    Adhesion of abdominal wall    Peripheral edema           Current Outpatient Medications on File Prior to Visit   Medication Sig Dispense Refill    acetaminophen (TYLENOL) 650 MG TbSR Take 650 mg by mouth once as needed (pain).      ascorbic acid, vitamin C, (VITAMIN C) 1000 MG tablet Take 1,000 mg by mouth once daily.      atorvastatin (LIPITOR) 40 MG tablet TAKE 1 TABLET BY MOUTH  DAILY 90 tablet 3    azelastine (ASTELIN) 137 mcg (0.1 %) nasal spray 2 sprays (274 mcg total) by Nasal route 2 (two) times daily as needed for Rhinitis.       biotin 10,000 mcg TbDL Take 1 tablet by mouth once daily.       calcium-vitamin D3 (OS-KASSANDRA 500 + D3) 500 mg-5 mcg (200 unit) per tablet Take 1,200 tablets by mouth once daily.      cefUROXime (CEFTIN) 500 MG tablet Take 1 tablet (500 mg total) by mouth every 12 (twelve) hours. 10 tablet 0    co-enzyme Q-10 30 mg capsule Take 30 mg by mouth 3 (three) times daily.      conjugated estrogens (PREMARIN) vaginal cream INSERT 1/2 GRAM VAGINALLY  TWICE WEEKLY 30 g 3    cranberry extract 200 mg Cap Take 1 capsule by mouth once daily.      D-MANNOSE ORAL Take 1 tablet by mouth once daily.       diclofenac sodium (VOLTAREN) 1 % Gel Apply 2 g topically 4 (four) times daily as needed (Back pain). 350 g 2    DULoxetine (CYMBALTA) 30 MG capsule Take 1 capsule (30 mg total) by mouth 2 (two) times daily. 180 capsule 3    famotidine (PEPCID) 20 MG tablet Take 1 tablet (20 mg total) by mouth 2 (two) times daily as needed for Heartburn. 60 tablet 2    flash glucose scanning reader (FREESTYLE EFREN 14 DAY READER) Misc Check blood glucose level 3 times daily. 1 each 0    flash glucose sensor (FREESTYLE EFREN 14 DAY SENSOR) Kit 1 application by Misc.(Non-Drug; Combo Route) route every 14 (fourteen) days. Disp 30 or 90 day refill 6 kit 3    fluticasone propionate (FLONASE) 50 mcg/actuation nasal spray 1 spray by Each Nostril route daily as needed for Rhinitis.      FREESTYLE LITE STRIPS Strp TEST DAILY AS DIRECTED 50 strip 2    gabapentin (NEURONTIN) 600 MG tablet Take 1 tablet (600 mg total) by mouth 2 (two) times daily. 180 tablet 3    hydrocortisone 2.5 % cream Apply topically 2 (two) times daily as needed. 1 each 1    hyoscyamine (ANASPAZ,LEVSIN) 0.125 mg Tab TAKE 1 TABLET(125 MCG) BY MOUTH EVERY 8 HOURS AS NEEDED FOR URGENCY 270 tablet 3    levothyroxine (SYNTHROID) 75 MCG tablet Take 1 tablet (75 mcg total) by mouth before breakfast. 90 tablet 3    LIDOcaine (LIDODERM) 5 % Place 1 patch onto the skin once daily.  Remove & Discard patch within 12 hours or as directed by MD 30 patch 0    magnesium 250 mg Tab Take 1,000 mg by mouth once daily.      metFORMIN (GLUCOPHAGE) 500 MG tablet TAKE 1 TABLET(500 MG) BY MOUTH DAILY WITH BREAKFAST 90 tablet 1    montelukast (SINGULAIR) 10 mg tablet TAKE 1 TABLET BY MOUTH  DAILY 90 tablet 3    ondansetron (ZOFRAN-ODT) 8 MG TbDL Take 1 tablet (8 mg total) by mouth every 8 (eight) hours as needed (nausea). 30 tablet 0    pantoprazole (PROTONIX) 20 MG tablet TAKE 1 TABLET(20 MG) BY MOUTH EVERY DAY 30 tablet 2    polyethylene glycol (GLYCOLAX) 17 gram/dose powder Take 17 g by mouth before dinner. 1530 g 1    vitamin D 1000 units Tab Take 1,000 Units by mouth once daily. D3       No current facility-administered medications on file prior to visit.           Review of patient's allergies indicates:   Allergen Reactions    Codeine Itching and Nausea And Vomiting    Dilaudid [hydromorphone (bulk)] Other (See Comments)     Oversedating, head burning. Pt prefers to avoid.       Percocet [oxycodone-acetaminophen] Itching    Sulfa (sulfonamide antibiotics) Itching and Nausea And Vomiting           Latex, natural rubber Rash               ROS:   General ROS: negative for - chills, fever or night sweats  Respiratory ROS: no cough, shortness of breath, or wheezing  Cardiovascular ROS: no chest pain or dyspnea on exertion  Musculoskeletal ROS: positive for - pain in foot - bilateral  Neurological ROS: negative for - seizures and tremors  Dermatological ROS: negative for pruritus and rash      EXAM:     Vitals:    05/20/22 1015   BP: 122/67   Pulse: 66   Weight: 60.3 kg (132 lb 15 oz)        General:  Alert and Oriented x 3;  No acute distress      Bilateral  Lower extremity exam:    Vascular:    Dorsalis Pedis:  present   Posterior Tibial:  present   Capillary refill time:  3 seconds   Temperature of toes cool to touch   Edema:  Trace       Neurological:      Sharp touch:  normal   Light  touch: normal   Tinels Sign:  Absent   Mulders Click:   Absent        Dermatological:    Skin: thin, atrophic and shiny   Wounds/Ulcers:  Absent   Bruising:  Absent   Erythema:  Absent   Toes dusky appearing      Musculoskeletal:    Metatarsophalangeal range of motion:   diminished range of motion   Subtalar joint range of motion: diminished range of motion   Ankle joint range of motion:  diminished range of motion   Bunions:  Absent   Hammertoes: Present

## 2022-05-24 ENCOUNTER — TELEPHONE (OUTPATIENT)
Dept: NEUROLOGY | Facility: CLINIC | Age: 77
End: 2022-05-24
Payer: MEDICARE

## 2022-05-27 ENCOUNTER — TELEPHONE (OUTPATIENT)
Dept: INTERNAL MEDICINE | Facility: CLINIC | Age: 77
End: 2022-05-27
Payer: MEDICARE

## 2022-05-27 ENCOUNTER — HOSPITAL ENCOUNTER (OUTPATIENT)
Facility: HOSPITAL | Age: 77
Discharge: HOME | End: 2022-05-29
Attending: EMERGENCY MEDICINE | Admitting: EMERGENCY MEDICINE
Payer: MEDICARE

## 2022-05-27 DIAGNOSIS — K56.609 SBO (SMALL BOWEL OBSTRUCTION): Primary | ICD-10-CM

## 2022-05-27 DIAGNOSIS — R10.9 ABDOMINAL PAIN: ICD-10-CM

## 2022-05-27 PROCEDURE — 80047 BASIC METABLC PNL IONIZED CA: CPT

## 2022-05-27 PROCEDURE — 99285 EMERGENCY DEPT VISIT HI MDM: CPT | Mod: CS,,, | Performed by: EMERGENCY MEDICINE

## 2022-05-27 PROCEDURE — 99285 PR EMERGENCY DEPT VISIT,LEVEL V: ICD-10-PCS | Mod: CS,,, | Performed by: EMERGENCY MEDICINE

## 2022-05-27 PROCEDURE — 99285 EMERGENCY DEPT VISIT HI MDM: CPT | Mod: 25,CS

## 2022-05-28 LAB
ALBUMIN SERPL BCP-MCNC: 4.4 G/DL (ref 3.5–5.2)
ALP SERPL-CCNC: 72 U/L (ref 55–135)
ALT SERPL W/O P-5'-P-CCNC: 29 U/L (ref 10–44)
ANION GAP SERPL CALC-SCNC: 15 MMOL/L (ref 8–16)
AST SERPL-CCNC: 32 U/L (ref 10–40)
BASOPHILS # BLD AUTO: 0.06 K/UL (ref 0–0.2)
BASOPHILS NFR BLD: 0.7 % (ref 0–1.9)
BILIRUB SERPL-MCNC: 1.7 MG/DL (ref 0.1–1)
BILIRUB UR QL STRIP: NEGATIVE
BUN SERPL-MCNC: 18 MG/DL (ref 8–23)
BUN SERPL-MCNC: 20 MG/DL (ref 6–30)
CALCIUM SERPL-MCNC: 10.5 MG/DL (ref 8.7–10.5)
CHLORIDE SERPL-SCNC: 102 MMOL/L (ref 95–110)
CHLORIDE SERPL-SCNC: 104 MMOL/L (ref 95–110)
CLARITY UR REFRACT.AUTO: CLEAR
CO2 SERPL-SCNC: 20 MMOL/L (ref 23–29)
COLOR UR AUTO: NORMAL
CREAT SERPL-MCNC: 0.9 MG/DL (ref 0.5–1.4)
CREAT SERPL-MCNC: 0.9 MG/DL (ref 0.5–1.4)
CTP QC/QA: YES
DIFFERENTIAL METHOD: ABNORMAL
EOSINOPHIL # BLD AUTO: 0.2 K/UL (ref 0–0.5)
EOSINOPHIL NFR BLD: 2.2 % (ref 0–8)
ERYTHROCYTE [DISTWIDTH] IN BLOOD BY AUTOMATED COUNT: 13.4 % (ref 11.5–14.5)
EST. GFR  (AFRICAN AMERICAN): >60 ML/MIN/1.73 M^2
EST. GFR  (NON AFRICAN AMERICAN): >60 ML/MIN/1.73 M^2
GLUCOSE SERPL-MCNC: 175 MG/DL (ref 70–110)
GLUCOSE SERPL-MCNC: 177 MG/DL (ref 70–110)
GLUCOSE UR QL STRIP: NEGATIVE
HCT VFR BLD AUTO: 40.8 % (ref 37–48.5)
HCT VFR BLD CALC: 40 %PCV (ref 36–54)
HGB BLD-MCNC: 13.5 G/DL (ref 12–16)
HGB UR QL STRIP: NEGATIVE
IMM GRANULOCYTES # BLD AUTO: 0.02 K/UL (ref 0–0.04)
IMM GRANULOCYTES NFR BLD AUTO: 0.2 % (ref 0–0.5)
KETONES UR QL STRIP: NEGATIVE
LEUKOCYTE ESTERASE UR QL STRIP: NEGATIVE
LIPASE SERPL-CCNC: 30 U/L (ref 4–60)
LYMPHOCYTES # BLD AUTO: 1.4 K/UL (ref 1–4.8)
LYMPHOCYTES NFR BLD: 15.7 % (ref 18–48)
MCH RBC QN AUTO: 31.8 PG (ref 27–31)
MCHC RBC AUTO-ENTMCNC: 33.1 G/DL (ref 32–36)
MCV RBC AUTO: 96 FL (ref 82–98)
MONOCYTES # BLD AUTO: 0.5 K/UL (ref 0.3–1)
MONOCYTES NFR BLD: 6.1 % (ref 4–15)
NEUTROPHILS # BLD AUTO: 6.5 K/UL (ref 1.8–7.7)
NEUTROPHILS NFR BLD: 75.1 % (ref 38–73)
NITRITE UR QL STRIP: NEGATIVE
NRBC BLD-RTO: 0 /100 WBC
PH UR STRIP: 6 [PH] (ref 5–8)
PLATELET # BLD AUTO: 78 K/UL (ref 150–450)
PMV BLD AUTO: 11 FL (ref 9.2–12.9)
POC IONIZED CALCIUM: 1.12 MMOL/L (ref 1.06–1.42)
POC TCO2 (MEASURED): 21 MMOL/L (ref 23–29)
POCT GLUCOSE: 143 MG/DL (ref 70–110)
POCT GLUCOSE: 148 MG/DL (ref 70–110)
POCT GLUCOSE: 148 MG/DL (ref 70–110)
POTASSIUM BLD-SCNC: 4.2 MMOL/L (ref 3.5–5.1)
POTASSIUM SERPL-SCNC: 4.4 MMOL/L (ref 3.5–5.1)
PROT SERPL-MCNC: 7.7 G/DL (ref 6–8.4)
PROT UR QL STRIP: NEGATIVE
RBC # BLD AUTO: 4.25 M/UL (ref 4–5.4)
SAMPLE: ABNORMAL
SARS-COV-2 RDRP RESP QL NAA+PROBE: NEGATIVE
SODIUM BLD-SCNC: 136 MMOL/L (ref 136–145)
SODIUM SERPL-SCNC: 137 MMOL/L (ref 136–145)
SP GR UR STRIP: 1 (ref 1–1.03)
URN SPEC COLLECT METH UR: NORMAL
WBC # BLD AUTO: 8.71 K/UL (ref 3.9–12.7)

## 2022-05-28 PROCEDURE — G0378 HOSPITAL OBSERVATION PER HR: HCPCS

## 2022-05-28 PROCEDURE — 63600175 PHARM REV CODE 636 W HCPCS: Performed by: STUDENT IN AN ORGANIZED HEALTH CARE EDUCATION/TRAINING PROGRAM

## 2022-05-28 PROCEDURE — 80053 COMPREHEN METABOLIC PANEL: CPT | Performed by: STUDENT IN AN ORGANIZED HEALTH CARE EDUCATION/TRAINING PROGRAM

## 2022-05-28 PROCEDURE — 85025 COMPLETE CBC W/AUTO DIFF WBC: CPT | Performed by: STUDENT IN AN ORGANIZED HEALTH CARE EDUCATION/TRAINING PROGRAM

## 2022-05-28 PROCEDURE — 93010 ELECTROCARDIOGRAM REPORT: CPT | Mod: ,,, | Performed by: INTERNAL MEDICINE

## 2022-05-28 PROCEDURE — 96372 THER/PROPH/DIAG INJ SC/IM: CPT | Mod: 59 | Performed by: SURGERY

## 2022-05-28 PROCEDURE — 83690 ASSAY OF LIPASE: CPT | Performed by: STUDENT IN AN ORGANIZED HEALTH CARE EDUCATION/TRAINING PROGRAM

## 2022-05-28 PROCEDURE — U0002 COVID-19 LAB TEST NON-CDC: HCPCS | Performed by: STUDENT IN AN ORGANIZED HEALTH CARE EDUCATION/TRAINING PROGRAM

## 2022-05-28 PROCEDURE — 86803 HEPATITIS C AB TEST: CPT | Performed by: STUDENT IN AN ORGANIZED HEALTH CARE EDUCATION/TRAINING PROGRAM

## 2022-05-28 PROCEDURE — 96374 THER/PROPH/DIAG INJ IV PUSH: CPT

## 2022-05-28 PROCEDURE — 96375 TX/PRO/DX INJ NEW DRUG ADDON: CPT

## 2022-05-28 PROCEDURE — 96361 HYDRATE IV INFUSION ADD-ON: CPT

## 2022-05-28 PROCEDURE — 81003 URINALYSIS AUTO W/O SCOPE: CPT | Performed by: STUDENT IN AN ORGANIZED HEALTH CARE EDUCATION/TRAINING PROGRAM

## 2022-05-28 PROCEDURE — 93005 ELECTROCARDIOGRAM TRACING: CPT

## 2022-05-28 PROCEDURE — 93010 EKG 12-LEAD: ICD-10-PCS | Mod: ,,, | Performed by: INTERNAL MEDICINE

## 2022-05-28 PROCEDURE — 25000003 PHARM REV CODE 250: Performed by: SURGERY

## 2022-05-28 PROCEDURE — 63600175 PHARM REV CODE 636 W HCPCS: Performed by: SURGERY

## 2022-05-28 PROCEDURE — 36415 COLL VENOUS BLD VENIPUNCTURE: CPT | Performed by: STUDENT IN AN ORGANIZED HEALTH CARE EDUCATION/TRAINING PROGRAM

## 2022-05-28 RX ORDER — INSULIN ASPART 100 [IU]/ML
0-5 INJECTION, SOLUTION INTRAVENOUS; SUBCUTANEOUS EVERY 6 HOURS PRN
Status: DISCONTINUED | OUTPATIENT
Start: 2022-05-28 | End: 2022-05-29 | Stop reason: HOSPADM

## 2022-05-28 RX ORDER — ACETAMINOPHEN 325 MG/1
650 TABLET ORAL EVERY 8 HOURS PRN
Status: DISCONTINUED | OUTPATIENT
Start: 2022-05-28 | End: 2022-05-29 | Stop reason: HOSPADM

## 2022-05-28 RX ORDER — PROCHLORPERAZINE EDISYLATE 5 MG/ML
5 INJECTION INTRAMUSCULAR; INTRAVENOUS EVERY 6 HOURS PRN
Status: DISCONTINUED | OUTPATIENT
Start: 2022-05-28 | End: 2022-05-29 | Stop reason: HOSPADM

## 2022-05-28 RX ORDER — TALC
6 POWDER (GRAM) TOPICAL NIGHTLY PRN
Status: DISCONTINUED | OUTPATIENT
Start: 2022-05-28 | End: 2022-05-29 | Stop reason: HOSPADM

## 2022-05-28 RX ORDER — SODIUM CHLORIDE 0.9 % (FLUSH) 0.9 %
10 SYRINGE (ML) INJECTION
Status: DISCONTINUED | OUTPATIENT
Start: 2022-05-28 | End: 2022-05-29 | Stop reason: HOSPADM

## 2022-05-28 RX ORDER — ENOXAPARIN SODIUM 100 MG/ML
40 INJECTION SUBCUTANEOUS EVERY 24 HOURS
Status: DISCONTINUED | OUTPATIENT
Start: 2022-05-28 | End: 2022-05-29 | Stop reason: HOSPADM

## 2022-05-28 RX ORDER — ONDANSETRON 2 MG/ML
4 INJECTION INTRAMUSCULAR; INTRAVENOUS
Status: COMPLETED | OUTPATIENT
Start: 2022-05-28 | End: 2022-05-28

## 2022-05-28 RX ORDER — MORPHINE SULFATE 4 MG/ML
4 INJECTION, SOLUTION INTRAMUSCULAR; INTRAVENOUS
Status: COMPLETED | OUTPATIENT
Start: 2022-05-28 | End: 2022-05-28

## 2022-05-28 RX ORDER — POLYETHYLENE GLYCOL 3350 17 G/17G
17 POWDER, FOR SOLUTION ORAL DAILY
Status: DISCONTINUED | OUTPATIENT
Start: 2022-05-28 | End: 2022-05-29 | Stop reason: HOSPADM

## 2022-05-28 RX ORDER — ONDANSETRON 2 MG/ML
4 INJECTION INTRAMUSCULAR; INTRAVENOUS EVERY 6 HOURS PRN
Status: DISCONTINUED | OUTPATIENT
Start: 2022-05-28 | End: 2022-05-29 | Stop reason: HOSPADM

## 2022-05-28 RX ORDER — PANTOPRAZOLE SODIUM 20 MG/1
20 TABLET, DELAYED RELEASE ORAL DAILY
Status: DISCONTINUED | OUTPATIENT
Start: 2022-05-28 | End: 2022-05-29 | Stop reason: HOSPADM

## 2022-05-28 RX ORDER — SODIUM CHLORIDE, SODIUM LACTATE, POTASSIUM CHLORIDE, CALCIUM CHLORIDE 600; 310; 30; 20 MG/100ML; MG/100ML; MG/100ML; MG/100ML
INJECTION, SOLUTION INTRAVENOUS CONTINUOUS
Status: DISCONTINUED | OUTPATIENT
Start: 2022-05-28 | End: 2022-05-29 | Stop reason: HOSPADM

## 2022-05-28 RX ORDER — GLUCAGON 1 MG
1 KIT INJECTION
Status: DISCONTINUED | OUTPATIENT
Start: 2022-05-28 | End: 2022-05-29 | Stop reason: HOSPADM

## 2022-05-28 RX ORDER — MONTELUKAST SODIUM 10 MG/1
10 TABLET ORAL DAILY
Status: DISCONTINUED | OUTPATIENT
Start: 2022-05-28 | End: 2022-05-29 | Stop reason: HOSPADM

## 2022-05-28 RX ADMIN — ENOXAPARIN SODIUM 40 MG: 40 INJECTION SUBCUTANEOUS at 06:05

## 2022-05-28 RX ADMIN — MONTELUKAST 10 MG: 10 TABLET, FILM COATED ORAL at 10:05

## 2022-05-28 RX ADMIN — MORPHINE SULFATE 4 MG: 4 INJECTION INTRAVENOUS at 12:05

## 2022-05-28 RX ADMIN — PANTOPRAZOLE SODIUM 20 MG: 20 TABLET, DELAYED RELEASE ORAL at 10:05

## 2022-05-28 RX ADMIN — ONDANSETRON 4 MG: 2 INJECTION INTRAMUSCULAR; INTRAVENOUS at 12:05

## 2022-05-28 RX ADMIN — SODIUM CHLORIDE, SODIUM LACTATE, POTASSIUM CHLORIDE, AND CALCIUM CHLORIDE: .6; .31; .03; .02 INJECTION, SOLUTION INTRAVENOUS at 06:05

## 2022-05-28 RX ADMIN — POLYETHYLENE GLYCOL 3350 17 G: 17 POWDER, FOR SOLUTION ORAL at 10:05

## 2022-05-28 NOTE — PROGRESS NOTES
[facility]  General Surgery  Progress Note    Subjective:     History of Present Illness:  No notes on file    Post-Op Info:  * No surgery found *         Interval History:   NAEON. Feels better this morning. No n/v. Belly pain remains, but similar to baseline.     Medications:  Continuous Infusions:   lactated ringers 100 mL/hr at 05/28/22 0655     Scheduled Meds:  PRN Meds:acetaminophen, diatrizoate meglumineand-diatrizoate sodium, melatonin, ondansetron, prochlorperazine, sodium chloride 0.9%     Review of patient's allergies indicates:   Allergen Reactions    Codeine Itching and Nausea And Vomiting    Dilaudid [hydromorphone (bulk)] Other (See Comments)     Oversedating, head burning. Pt prefers to avoid.       Percocet [oxycodone-acetaminophen] Itching    Sulfa (sulfonamide antibiotics) Itching and Nausea And Vomiting           Latex, natural rubber Rash     Objective:     Vital Signs (Most Recent):  Temp: 97 °F (36.1 °C) (05/28/22 0822)  Pulse: 67 (05/28/22 0822)  Resp: 18 (05/28/22 0822)  BP: (!) 103/53 (05/28/22 0822)  SpO2: 96 % (05/28/22 0822)   Vital Signs (24h Range):  Temp:  [97 °F (36.1 °C)-97.6 °F (36.4 °C)] 97 °F (36.1 °C)  Pulse:  [61-76] 67  Resp:  [15-18] 18  SpO2:  [94 %-100 %] 96 %  BP: (103-126)/(53-69) 103/53     Weight: 57.3 kg (126 lb 5.2 oz)  Body mass index is 23.1 kg/m².    Intake/Output - Last 3 Shifts       None            Physical Exam  Physical Exam  Vitals and nursing note reviewed.   HENT:      Head: Normocephalic.      Mouth/Throat:      Mouth: Mucous membranes are dry.   Eyes:      Extraocular Movements: Extraocular movements intact.      Pupils: Pupils are equal, round, and reactive to light.   Cardiovascular:      Rate and Rhythm: Normal rate and regular rhythm.      Pulses: Normal pulses.   Pulmonary:      Effort: Pulmonary effort is normal.   Abdominal:      General: There is distension.      Palpations: Abdomen is soft.      Tenderness: There is no abdominal  tenderness.   Musculoskeletal:         General: Normal range of motion.   Skin:     General: Skin is warm.   Neurological:      General: No focal deficit present.      Mental Status: She is alert and oriented to person, place, and time.   Psychiatric:         Mood and Affect: Mood normal.         Behavior: Behavior normal.      Significant Labs:  I have reviewed all pertinent lab results within the past 24 hours.    Significant Diagnostics:  I have reviewed all pertinent imaging results/findings within the past 24 hours.    Assessment/Plan:     * Generalized abdominal pain  77 y.o. 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 Most recently underwent ex-lap and attempted TIFFANY 3. Returns last night with concern for recurrent partial SBO. Feeling better today - improved pain, no n/v, passed small bowel movement.      GGC; KUB at 4 pm  CLD  PRN pain, anti-emetics  mIVF until tolerating diet  Possible d/c home if passes GGC and tolerates liquids.           Ayesha Hendrickson MD  General Surgery  [facility] - Surgery

## 2022-05-28 NOTE — ASSESSMENT & PLAN NOTE
77 y.o. 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 Most recently underwent ex-lap and attempted TIFFANY 3. Returns last night with concern for recurrent partial SBO. Feeling better today - improved pain, no n/v, passed small bowel movement.      GGC; KUB at 4 pm  CLD  PRN pain, anti-emetics  mIVF until tolerating diet  Possible d/c home if passes GGC and tolerates liquids.

## 2022-05-28 NOTE — PLAN OF CARE
AAOx4 w/ VSS during the shift. Pt was suppose to get gastrograffin but med is not available (national shortage). MD is aware and will continue to monitor. Per MD, pt have bowel sounds and passing flatus so no major concerns at this time. Diet advanced from NPO to clears and tolerated w/o N/V. IV fluids adjusted from 100cc/hr to 50cc/hr per MD order. Assessment done, frequents done Q2H, during med pass and VS.

## 2022-05-28 NOTE — SUBJECTIVE & OBJECTIVE
Interval History:   NAEON. Feels better this morning. No n/v. Belly pain remains, but similar to baseline.     Medications:  Continuous Infusions:   lactated ringers 100 mL/hr at 05/28/22 0655     Scheduled Meds:  PRN Meds:acetaminophen, diatrizoate meglumineand-diatrizoate sodium, melatonin, ondansetron, prochlorperazine, sodium chloride 0.9%     Review of patient's allergies indicates:   Allergen Reactions    Codeine Itching and Nausea And Vomiting    Dilaudid [hydromorphone (bulk)] Other (See Comments)     Oversedating, head burning. Pt prefers to avoid.       Percocet [oxycodone-acetaminophen] Itching    Sulfa (sulfonamide antibiotics) Itching and Nausea And Vomiting           Latex, natural rubber Rash     Objective:     Vital Signs (Most Recent):  Temp: 97 °F (36.1 °C) (05/28/22 0822)  Pulse: 67 (05/28/22 0822)  Resp: 18 (05/28/22 0822)  BP: (!) 103/53 (05/28/22 0822)  SpO2: 96 % (05/28/22 0822)   Vital Signs (24h Range):  Temp:  [97 °F (36.1 °C)-97.6 °F (36.4 °C)] 97 °F (36.1 °C)  Pulse:  [61-76] 67  Resp:  [15-18] 18  SpO2:  [94 %-100 %] 96 %  BP: (103-126)/(53-69) 103/53     Weight: 57.3 kg (126 lb 5.2 oz)  Body mass index is 23.1 kg/m².    Intake/Output - Last 3 Shifts       None            Physical Exam  Physical Exam  Vitals and nursing note reviewed.   HENT:      Head: Normocephalic.      Mouth/Throat:      Mouth: Mucous membranes are dry.   Eyes:      Extraocular Movements: Extraocular movements intact.      Pupils: Pupils are equal, round, and reactive to light.   Cardiovascular:      Rate and Rhythm: Normal rate and regular rhythm.      Pulses: Normal pulses.   Pulmonary:      Effort: Pulmonary effort is normal.   Abdominal:      General: There is distension.      Palpations: Abdomen is soft.      Tenderness: There is no abdominal tenderness.   Musculoskeletal:         General: Normal range of motion.   Skin:     General: Skin is warm.   Neurological:      General: No focal deficit present.       Mental Status: She is alert and oriented to person, place, and time.   Psychiatric:         Mood and Affect: Mood normal.         Behavior: Behavior normal.      Significant Labs:  I have reviewed all pertinent lab results within the past 24 hours.    Significant Diagnostics:  I have reviewed all pertinent imaging results/findings within the past 24 hours.

## 2022-05-28 NOTE — ED NOTES
Pt states to hold off on NG tube placement. ER MD aware, will wait for gen-surg to consult patient. Pt and caregiver updated on plan of care.

## 2022-05-28 NOTE — ED PROVIDER NOTES
"Encounter Date: 2022       History     Chief Complaint   Patient presents with    Abdominal Pain     Pt c/o abdominal pain w/ emesis, "both diarrhea and constipation" Hx of colon cancer. Not on chemo.      Patient is a 77-year-old female with a past medical history of HLD, hypothyroidism, colon cancer in remission and myelodysplastic syndrome presenting to the emergency department for abdominal pain that began earlier today.  She has had associated nausea, and nonbloody/nonbilious emesis x1.  She has additionally had episodes of diarrhea.  Is still able to pass gas. Of note, she has had multiple prior small-bowel obstructions requiring surgical intervention. With her bowel obstructions, she has still been able to pass stool. She denies fevers/chills, chest pain, shortness of breath and urinary symptoms.        Review of patient's allergies indicates:   Allergen Reactions    Codeine Itching and Nausea And Vomiting    Dilaudid [hydromorphone (bulk)] Other (See Comments)     Oversedating, head burning. Pt prefers to avoid.       Percocet [oxycodone-acetaminophen] Itching    Sulfa (sulfonamide antibiotics) Itching and Nausea And Vomiting           Latex, natural rubber Rash     Past Medical History:   Diagnosis Date    Abdominal pain     Allergic rhinitis     Arthritis     Blood platelet disorder     Blood platelet disorder     Blood transfusion     during delivery and     Bowel obstruction     Cervical radiculopathy     followed by dr cloud    Colon cancer     transverse colon; resected; Stage IIA (pT3 pN0 MX)    Diabetes mellitus     Diarrhea     Falls 2020    Family history of breast cancer     Family history of colon cancer     Fatty liver     GERD (gastroesophageal reflux disease)     History of shingles     Hyperlipidemia     Hypothyroidism     Irritable bowel syndrome     Microscopic colitis     treated     Myelodysplastic syndrome     Raynaud phenomenon     " Raynaud's disease     Type 2 diabetes mellitus      Past Surgical History:   Procedure Laterality Date    ADENOIDECTOMY      APPENDECTOMY      BACK SURGERY      CARPAL TUNNEL RELEASE      bilateral      SECTION      CHOLECYSTECTOMY  1965    COLECTOMY  2011    Transverse colon resection by Dr. Aguirre    COLONOSCOPY N/A 2017    Procedure: COLONOSCOPY;  Surgeon: Manjit Alvarez MD;  Location: [facility] (4TH FLR);  Service: Endoscopy;  Laterality: N/A;    COLONOSCOPY N/A 2019    Procedure: COLONOSCOPY;  Surgeon: Ramiro Jefferson MD;  Location: [facility] (4TH FLR);  Service: Endoscopy;  Laterality: N/A;  PM prep    COLONOSCOPY N/A 2022    Procedure: COLONOSCOPY;  Surgeon: Ramiro Jefferson MD;  Location: [facility] (2ND FLR);  Service: Endoscopy;  Laterality: N/A;  EGD and colonoscopy in 6-8 weeks from now     states has constipation. -extended Golytely prep    CYSTOSCOPY N/A 2021    Procedure: CYSTOSCOPY;  Surgeon: Viridiana Valenzuela MD;  Location: [facility] 1ST FLR;  Service: Urology;  Laterality: N/A;    ENDOSCOPIC ULTRASOUND OF UPPER GASTROINTESTINAL TRACT N/A 2018    Procedure: ULTRASOUND, UPPER GI TRACT, ENDOSCOPIC;  Surgeon: Jose Hess MD;  Location: [facility];  Service: Endoscopy;  Laterality: N/A;    ENDOSCOPIC ULTRASOUND OF UPPER GASTROINTESTINAL TRACT N/A 2019    Procedure: ULTRASOUND, UPPER GI TRACT, ENDOSCOPIC;  Surgeon: Jose Hess MD;  Location: [facility] (2ND FLR);  Service: Endoscopy;  Laterality: N/A;    ESOPHAGOGASTRODUODENOSCOPY N/A 2018    Procedure: EGD (ESOPHAGOGASTRODUODENOSCOPY);  Surgeon: Angelo Reynolds MD;  Location: [facility] (2ND FLR);  Service: Endoscopy;  Laterality: N/A;    ESOPHAGOGASTRODUODENOSCOPY N/A 2019    Procedure: EGD (ESOPHAGOGASTRODUODENOSCOPY);  Surgeon: Ramiro Jefferson MD;  Location: [facility] (4TH FLR);  Service: Endoscopy;  Laterality: N/A;    ESOPHAGOGASTRODUODENOSCOPY N/A 2022     Procedure: EGD (ESOPHAGOGASTRODUODENOSCOPY);  Surgeon: Ramiro Jefferson MD;  Location: [facility] ENDO (2ND FLR);  Service: Endoscopy;  Laterality: N/A;  fully vaccinated-GT    EYE SURGERY      Cataract Removal    FLUOROSCOPIC URODYNAMIC STUDY N/A 11/09/2021    Procedure: URODYNAMIC STUDY, FLUOROSCOPIC;  Surgeon: Viridiana Valenzuela MD;  Location: [facility] OR 1ST FLR;  Service: Urology;  Laterality: N/A;  90 minutes     HYSTERECTOMY      JOINT REPLACEMENT      posterolateral fusion with autograft bone and Eun mineralized bone matrix  02/01/2013    at [facility] for lumbar spine stenosis    SMALL INTESTINE SURGERY      SPINE SURGERY      TOE SURGERY      TONSILLECTOMY      TRIGGER FINGER RELEASE       Family History   Problem Relation Age of Onset    Heart disease Father 50        Mi age 50    Colon cancer Father     Bladder Cancer Mother         non smoker    Cataracts Mother     Glaucoma Mother     Heart disease Mother     Hyperlipidemia Mother     Kidney disease Mother     Breast cancer Sister 79    Arthritis Daughter     Asthma Daughter     Depression Daughter     Hypertension Daughter     Stroke Daughter 40    Arthritis Brother     Colon cancer Brother 70    Alcohol abuse Brother     Parkinsonism Brother     Alcohol abuse Brother     Depression Daughter     Stroke Daughter     Alcohol abuse Brother     Arthritis Brother     Asthma Daughter     Depression Daughter     Celiac disease Neg Hx     Cirrhosis Neg Hx     Colon polyps Neg Hx     Crohn's disease Neg Hx     Cystic fibrosis Neg Hx     Esophageal cancer Neg Hx     Hemochromatosis Neg Hx     Inflammatory bowel disease Neg Hx     Irritable bowel syndrome Neg Hx     Liver cancer Neg Hx     Liver disease Neg Hx     Rectal cancer Neg Hx     Stomach cancer Neg Hx     Ulcerative colitis Neg Hx     Eliazar's disease Neg Hx     Amblyopia Neg Hx     Blindness Neg Hx     Macular degeneration Neg Hx     Retinal detachment Neg  Hx     Strabismus Neg Hx     Melanoma Neg Hx      Social History     Tobacco Use    Smoking status: Never Smoker    Smokeless tobacco: Never Used   Substance Use Topics    Alcohol use: Not Currently     Alcohol/week: 3.0 standard drinks     Types: 3 Glasses of wine per week     Comment: socially, hardly ever    Drug use: Never     Review of Systems   Constitutional: Negative for activity change, chills and fever.   HENT: Negative for congestion, ear pain and sore throat.    Respiratory: Negative for shortness of breath and stridor.    Cardiovascular: Negative for chest pain and palpitations.   Gastrointestinal: Positive for abdominal pain, diarrhea, nausea and vomiting (x1).   Genitourinary: Negative for dysuria.   Musculoskeletal: Negative for back pain.   Skin: Negative for rash.   Neurological: Negative for dizziness, syncope, weakness and headaches.   Hematological: Does not bruise/bleed easily.       Physical Exam     Initial Vitals [05/27/22 2332]   BP Pulse Resp Temp SpO2   123/69 76 15 97.6 °F (36.4 °C) 100 %      MAP       --         Physical Exam    Nursing note and vitals reviewed.  Constitutional: She appears well-developed and well-nourished. She is not diaphoretic. No distress.   Well-appearing.  Speaking full sentences.  No acute distress.   HENT:   Head: Normocephalic and atraumatic.   Right Ear: External ear normal.   Left Ear: External ear normal.   Neck: Neck supple.   Cardiovascular: Normal rate, regular rhythm, normal heart sounds and intact distal pulses.   Pulmonary/Chest: Breath sounds normal. No respiratory distress. She has no wheezes. She has no rhonchi. She has no rales.   Abdominal: Abdomen is soft. She exhibits no distension. A surgical scar is present. There is generalized abdominal tenderness.   Generalized abdominal tenderness to palpation There is no rebound and no guarding.   Musculoskeletal:      Cervical back: Neck supple.     Neurological: She is alert and oriented to  person, place, and time. GCS score is 15. GCS eye subscore is 4. GCS verbal subscore is 5. GCS motor subscore is 6.   Skin: Skin is warm. Capillary refill takes less than 2 seconds. No rash noted.   Psychiatric: She has a normal mood and affect.         ED Course   Procedures  Labs Reviewed   CBC W/ AUTO DIFFERENTIAL - Abnormal; Notable for the following components:       Result Value    MCH 31.8 (*)     Platelets 78 (*)     Gran % 75.1 (*)     Lymph % 15.7 (*)     All other components within normal limits   COMPREHENSIVE METABOLIC PANEL - Abnormal; Notable for the following components:    CO2 20 (*)     Glucose 177 (*)     Total Bilirubin 1.7 (*)     All other components within normal limits   ISTAT PROCEDURE - Abnormal; Notable for the following components:    POC Glucose 175 (*)     POC TCO2 (MEASURED) 21 (*)     All other components within normal limits   LIPASE   HEPATITIS C ANTIBODY   URINALYSIS, REFLEX TO URINE CULTURE   SARS-COV-2 RDRP GENE    Narrative:     This test utilizes isothermal nucleic acid amplification   technology to detect the SARS-CoV-2 RdRp nucleic acid segment.   The analytical sensitivity (limit of detection) is 125 genome   equivalents/mL.   A POSITIVE result implies infection with the SARS-CoV-2 virus;   the patient is presumed to be contagious.     A NEGATIVE result means that SARS-CoV-2 nucleic acids are not   present above the limit of detection. A NEGATIVE result should be   treated as presumptive. It does not rule out the possibility of   COVID-19 and should not be the sole basis for treatment decisions.   If COVID-19 is strongly suspected based on clinical and exposure   history, re-testing using an alternate molecular assay should be   considered.   This test is only for use under the Food and Drug   Administration s Emergency Use Authorization (EUA).   Commercial kits are provided by [employer].   Performance characteristics of the EUA have been independently   verified by  [facility] Department of   Pathology and Laboratory Medicine.   _________________________________________________________________   The authorized Fact Sheet for Healthcare Providers and the authorized Fact   Sheet for Patients of the ID NOW COVID-19 are available on the FDA   website:     https://www.fda.gov/media/207347/download  https://www.fda.gov/media/736572/download            EKG Readings: (Independently Interpreted)   Initial Reading: No STEMI. Previous EKG: Compared with most recent EKG Previous EKG Date: January 2022. Rhythm: Normal Sinus Rhythm. Heart Rate: 72. Ectopy: No Ectopy. Conduction: Normal.       Imaging Results           CT Abdomen Pelvis  Without Contrast (Final result)  Result time 05/28/22 02:08:06    Final result by Herman Flowers MD (05/28/22 02:08:06)                 Impression:      Distended loops of small bowel with air-fluid levels in the right hemiabdomen.  Decompressed loops of bowel with surrounding soft tissue thickening near the midline.  Overall configuration of the bowel loops appear similar dating back to 02/10/2022.  Finding may represent partial small bowel obstruction.  Possible small bowel transition point near suggested adhesions in the right mid abdomen anteriorly.    Additional stable findings above.    This report was flagged in Epic as abnormal.    Electronically signed by resident: Timoteo Conroy  Date:    05/28/2022  Time:    01:33    Electronically signed by: Herman Flowers MD  Date:    05/28/2022  Time:    02:08             Narrative:    EXAMINATION:  CT ABDOMEN PELVIS WITHOUT CONTRAST    CLINICAL HISTORY:  Bowel obstruction suspected;    TECHNIQUE:  Routine axial CT images of the abdomen and pelvis were obtained without contrast.  Oral contrast not administered.  Coronal and Sagittal reformatted images were also obtained.    COMPARISON:  CT abdomen pelvis 02/10/2022    FINDINGS:  Heart: No cardiomegaly or significant pericardial fluid.    Lungs:  Bilateral lower lobe posterior reticulation favored represent atelectasis and/or scarring.  No new focal consolidation or pleural fluid.    Liver: Normal in size and attenuation, with no focal hepatic lesions.    Gallbladder: Surgically absent.    Bile ducts: Stable mild prominence of the common duct.  No intrahepatic ductal dilatation.    Pancreas: No mass or peripancreatic fat stranding.    Spleen: Upper limits of normal in size.    Adrenals: Normal.    GI Tract/ Mesentery/abdominal wall: Postoperative changes of multiple bowel resections including reported transverse partial colectomy in this patient with history of cancer.  Distended bowel loops most pronounced in the right hemiabdomen with air-fluid levels measuring up to 4.6 cm.  There are several decompressed bowel loops closely applied to the anterior abdominal wall with associated surrounding soft tissue thickening anteriorly suggestive of adhesions.  Possible transition point in the right mid abdomen anteriorly (coronal image 102).  Patulous appearance of small bowel adjacent to the anastomosis in the lower abdomen similar to prior studies.  Remaining colon demonstrates mild colonic stool burden and distension.  Overall the configuration of the bowel and anterior abdominal wall appears similarly to 02/10/2022.  Mild scattered fat stranding of the mesentery elsewhere.  There is hyperdense fluid in the stomach lumen.  Multiple hyperdense foci within the cecum which may represent  ingested contents.  No pneumoperitoneum, portal venous gas, or convincing pneumatosis.    Kidneys/ Ureters: Small in size normal in location.  Right extrarenal pelvis is suspected.  No hydronephrosis or nephrolithiasis.    Bladder: No evidence of wall thickening.    Reproductive organs: Uterus is surgically absent.    Retroperitoneum: No pathological jessica enlargement.    Peritoneal space: No free air.    Vasculature: Scattered calcific atherosclerosis.  No aneurysm.  Continued soft  tissue haziness in thickening about the celiac axis, SMA, and aortic bifurcation.    Bones: No acute fracture. Advanced degenerative changes of the of visualized spine overall similar to prior.  Grade 2 anterolisthesis L5 on S1 noting osseous fusion similar to prior.  Bilateral pars defects at L5.  Grade 1 anterolisthesis L4 on L5 similar to prior.                                 Medications   sodium chloride 0.9% flush 10 mL (has no administration in time range)   melatonin tablet 6 mg (has no administration in time range)   acetaminophen tablet 650 mg (has no administration in time range)   lactated ringers infusion ( Intravenous New Bag 5/28/22 0655)   ondansetron injection 4 mg (has no administration in time range)   prochlorperazine injection Soln 5 mg (has no administration in time range)   ondansetron injection 4 mg (4 mg Intravenous Given 5/28/22 0051)   morphine injection 4 mg (4 mg Intravenous Given 5/28/22 0052)     Medical Decision Making:   History:   I obtained history from: someone other than patient.  Old Medical Records: I decided to obtain old medical records.  Initial Assessment:   Emergent evaluation of abdominal pain.  She is afebrile and hemodynamically stable.  Differential Diagnosis:   Small-bowel obstruction, colon cancer, COVID/other gastroenteritis, bowel perforation, pancreatitis, UTI  Clinical Tests:   Lab Tests: Ordered and Reviewed  Radiological Study: Ordered and Reviewed  Medical Tests: Ordered and Reviewed  ED Management:  Labs remarkable for total bili of 1.7, elevated from her previous.  Otherwise, labs are unremarkable.  Her CT abdomen and pelvis without contrast is remarkable for a partial small-bowel obstruction.  Discussed case with surgery, who admitted patient to their service.                      Clinical Impression:   Final diagnoses:  [R10.9] Abdominal pain  [K56.609] SBO (small bowel obstruction)          ED Disposition Condition    Observation               Canelo FERRARO  MD Sarita  Resident  05/28/22 0697

## 2022-05-28 NOTE — CONSULTS
[facility] - [facility] Dept  General Surgery  Consult Note    Inpatient consult to General Surgery  Consult performed by: Lai Castro MD  Consult ordered by: Lai Castro MD        Subjective:     Chief Complaint/Reason for Admission: SBO    History of Present Illness: Anayeli Judd is a 77 y.o. 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 Most recently underwent ex-lap and attempted TIFFANY 3 months ago but the procedure was aborted due to dense adhesions. She presents to the ED with complaint of epigastric abdominal pain, nausea, and vomiting x1 day. These symptoms are similar to previous SBO episodes. Reports she is still passing some flatus and had loose BM yesterday.   Vitals and labs in the ED are unremarkable.      Surg Hx: Open transverse colectomy, open cholecystectomy   Social: Non-smoker     No current facility-administered medications on file prior to encounter.     Current Outpatient Medications on File Prior to Encounter   Medication Sig    acetaminophen (TYLENOL) 650 MG TbSR Take 650 mg by mouth once as needed (pain).    ascorbic acid, vitamin C, (VITAMIN C) 1000 MG tablet Take 1,000 mg by mouth once daily.    atorvastatin (LIPITOR) 40 MG tablet TAKE 1 TABLET BY MOUTH  DAILY    azelastine (ASTELIN) 137 mcg (0.1 %) nasal spray 2 sprays (274 mcg total) by Nasal route 2 (two) times daily as needed for Rhinitis.    biotin 10,000 mcg TbDL Take 1 tablet by mouth once daily.     calcium-vitamin D3 (OS-KASSANDRA 500 + D3) 500 mg-5 mcg (200 unit) per tablet Take 1,200 tablets by mouth once daily.    cefUROXime (CEFTIN) 500 MG tablet Take 1 tablet (500 mg total) by mouth every 12 (twelve) hours.    co-enzyme Q-10 30 mg capsule Take 30 mg by mouth 3 (three) times daily.    conjugated estrogens (PREMARIN) vaginal cream INSERT 1/2 GRAM VAGINALLY  TWICE WEEKLY    cranberry extract 200 mg Cap Take 1 capsule by mouth once daily.    D-MANNOSE ORAL Take 1 tablet  by mouth once daily.     diclofenac sodium (VOLTAREN) 1 % Gel Apply 2 g topically 4 (four) times daily as needed (Back pain).    DULoxetine (CYMBALTA) 30 MG capsule Take 1 capsule (30 mg total) by mouth 2 (two) times daily.    famotidine (PEPCID) 20 MG tablet Take 1 tablet (20 mg total) by mouth 2 (two) times daily as needed for Heartburn.    flash glucose scanning reader (FREESTYLE EFREN 14 DAY READER) Misc Check blood glucose level 3 times daily.    flash glucose sensor (FREESTYLE EFREN 14 DAY SENSOR) Kit 1 application by Misc.(Non-Drug; Combo Route) route every 14 (fourteen) days. Disp 30 or 90 day refill    fluticasone propionate (FLONASE) 50 mcg/actuation nasal spray 1 spray by Each Nostril route daily as needed for Rhinitis.    FREESTYLE LITE STRIPS Strp TEST DAILY AS DIRECTED    gabapentin (NEURONTIN) 600 MG tablet Take 1 tablet (600 mg total) by mouth 2 (two) times daily.    hydrocortisone 2.5 % cream Apply topically 2 (two) times daily as needed.    hyoscyamine (ANASPAZ,LEVSIN) 0.125 mg Tab TAKE 1 TABLET(125 MCG) BY MOUTH EVERY 8 HOURS AS NEEDED FOR URGENCY    levothyroxine (SYNTHROID) 75 MCG tablet Take 1 tablet (75 mcg total) by mouth before breakfast.    LIDOcaine (LIDODERM) 5 % Place 1 patch onto the skin once daily. Remove & Discard patch within 12 hours or as directed by MD    magnesium 250 mg Tab Take 1,000 mg by mouth once daily.    metFORMIN (GLUCOPHAGE) 500 MG tablet TAKE 1 TABLET(500 MG) BY MOUTH DAILY WITH BREAKFAST    montelukast (SINGULAIR) 10 mg tablet TAKE 1 TABLET BY MOUTH  DAILY    ondansetron (ZOFRAN-ODT) 8 MG TbDL Take 1 tablet (8 mg total) by mouth every 8 (eight) hours as needed (nausea).    pantoprazole (PROTONIX) 20 MG tablet TAKE 1 TABLET(20 MG) BY MOUTH EVERY DAY    polyethylene glycol (GLYCOLAX) 17 gram/dose powder Take 17 g by mouth before dinner.    vitamin D 1000 units Tab Take 1,000 Units by mouth once daily. D3       Review of patient's allergies indicates:    Allergen Reactions    Codeine Itching and Nausea And Vomiting    Dilaudid [hydromorphone (bulk)] Other (See Comments)     Oversedating, head burning. Pt prefers to avoid.       Percocet [oxycodone-acetaminophen] Itching    Sulfa (sulfonamide antibiotics) Itching and Nausea And Vomiting           Latex, natural rubber Rash       Past Medical History:   Diagnosis Date    Abdominal pain     Allergic rhinitis     Arthritis     Blood platelet disorder     Blood platelet disorder     Blood transfusion     during delivery and     Bowel obstruction     Cervical radiculopathy     followed by dr cloud    Colon cancer     transverse colon; resected; Stage IIA (pT3 pN0 MX)    Diabetes mellitus     Diarrhea     Falls 2020    Family history of breast cancer     Family history of colon cancer     Fatty liver     GERD (gastroesophageal reflux disease)     History of shingles     Hyperlipidemia     Hypothyroidism     Irritable bowel syndrome     Microscopic colitis     treated     Myelodysplastic syndrome     Raynaud phenomenon     Raynaud's disease     Type 2 diabetes mellitus      Past Surgical History:   Procedure Laterality Date    ADENOIDECTOMY      APPENDECTOMY      BACK SURGERY      CARPAL TUNNEL RELEASE      bilateral      SECTION      CHOLECYSTECTOMY  1965    COLECTOMY  2011    Transverse colon resection by Dr. Aguirre    COLONOSCOPY N/A 2017    Procedure: COLONOSCOPY;  Surgeon: Manjit Alvarez MD;  Location: [facility] (4TH FLR);  Service: Endoscopy;  Laterality: N/A;    COLONOSCOPY N/A 2019    Procedure: COLONOSCOPY;  Surgeon: Ramiro Jefferson MD;  Location: [facility] (4TH FLR);  Service: Endoscopy;  Laterality: N/A;  PM prep    COLONOSCOPY N/A 2022    Procedure: COLONOSCOPY;  Surgeon: Ramiro Jefferson MD;  Location: [facility] (2ND FLR);  Service: Endoscopy;  Laterality: N/A;  EGD and colonoscopy in 6-8 weeks from now     states  has constipation. -extended Golytely prep    CYSTOSCOPY N/A 11/09/2021    Procedure: CYSTOSCOPY;  Surgeon: Viridiana Valenzuela MD;  Location: [facility] OR 1ST FLR;  Service: Urology;  Laterality: N/A;    ENDOSCOPIC ULTRASOUND OF UPPER GASTROINTESTINAL TRACT N/A 12/12/2018    Procedure: ULTRASOUND, UPPER GI TRACT, ENDOSCOPIC;  Surgeon: Jose Hess MD;  Location: [facility] ENDO;  Service: Endoscopy;  Laterality: N/A;    ENDOSCOPIC ULTRASOUND OF UPPER GASTROINTESTINAL TRACT N/A 01/23/2019    Procedure: ULTRASOUND, UPPER GI TRACT, ENDOSCOPIC;  Surgeon: Jose Hess MD;  Location: [facility] ENDO (2ND FLR);  Service: Endoscopy;  Laterality: N/A;    ESOPHAGOGASTRODUODENOSCOPY N/A 11/16/2018    Procedure: EGD (ESOPHAGOGASTRODUODENOSCOPY);  Surgeon: Angelo Reynolds MD;  Location: [facility] (2ND FLR);  Service: Endoscopy;  Laterality: N/A;    ESOPHAGOGASTRODUODENOSCOPY N/A 06/26/2019    Procedure: EGD (ESOPHAGOGASTRODUODENOSCOPY);  Surgeon: Ramiro Jefferson MD;  Location: [facility] (4TH FLR);  Service: Endoscopy;  Laterality: N/A;    ESOPHAGOGASTRODUODENOSCOPY N/A 5/4/2022    Procedure: EGD (ESOPHAGOGASTRODUODENOSCOPY);  Surgeon: Ramiro Jefferson MD;  Location: [facility] (2ND FLR);  Service: Endoscopy;  Laterality: N/A;  fully vaccinated-GT    EYE SURGERY      Cataract Removal    FLUOROSCOPIC URODYNAMIC STUDY N/A 11/09/2021    Procedure: URODYNAMIC STUDY, FLUOROSCOPIC;  Surgeon: Viridiana Valenzuela MD;  Location: [facility] OR 1ST FLR;  Service: Urology;  Laterality: N/A;  90 minutes     HYSTERECTOMY      JOINT REPLACEMENT      posterolateral fusion with autograft bone and Burnett mineralized bone matrix  02/01/2013    at [facility] for lumbar spine stenosis    SMALL INTESTINE SURGERY      SPINE SURGERY      TOE SURGERY      TONSILLECTOMY      TRIGGER FINGER RELEASE       Family History     Problem Relation (Age of Onset)    Alcohol abuse Brother, Brother, Brother    Arthritis Daughter, Brother, Brother    Asthma Daughter,  Daughter    Bladder Cancer Mother    Breast cancer Sister (79)    Cataracts Mother    Colon cancer Father, Brother (70)    Depression Daughter, Daughter, Daughter    Glaucoma Mother    Heart disease Father (50), Mother    Hyperlipidemia Mother    Hypertension Daughter    Kidney disease Mother    Parkinsonism Brother    Stroke Daughter (40), Daughter        Tobacco Use    Smoking status: Never Smoker    Smokeless tobacco: Never Used   Substance and Sexual Activity    Alcohol use: Not Currently     Alcohol/week: 3.0 standard drinks     Types: 3 Glasses of wine per week     Comment: socially, hardly ever    Drug use: Never    Sexual activity: Not Currently     Review of Systems   Constitutional: Positive for activity change and appetite change. Negative for chills and fever.   HENT: Negative.    Eyes: Negative.    Respiratory: Negative.    Cardiovascular: Negative.    Gastrointestinal: Positive for abdominal distention, abdominal pain, diarrhea, nausea and vomiting.   Endocrine: Negative.    Genitourinary: Negative.    Musculoskeletal: Negative.    Neurological: Negative.    Hematological: Negative.    Psychiatric/Behavioral: Negative.      Objective:     Vital Signs (Most Recent):  Temp: 97.6 °F (36.4 °C) (05/27/22 2332)  Pulse: 67 (05/28/22 0303)  Resp: 18 (05/28/22 0052)  BP: 107/64 (05/28/22 0303)  SpO2: 98 % (05/28/22 0303) Vital Signs (24h Range):  Temp:  [97.6 °F (36.4 °C)] 97.6 °F (36.4 °C)  Pulse:  [67-76] 67  Resp:  [15-18] 18  SpO2:  [98 %-100 %] 98 %  BP: (107-123)/(64-69) 107/64     Weight: 59.9 kg (132 lb)  Body mass index is 24.14 kg/m².    No intake or output data in the 24 hours ending 05/28/22 0414    Physical Exam  Vitals and nursing note reviewed.   HENT:      Head: Normocephalic.      Mouth/Throat:      Mouth: Mucous membranes are dry.   Eyes:      Extraocular Movements: Extraocular movements intact.      Pupils: Pupils are equal, round, and reactive to light.   Cardiovascular:      Rate and  Rhythm: Normal rate and regular rhythm.      Pulses: Normal pulses.   Pulmonary:      Effort: Pulmonary effort is normal.   Abdominal:      General: There is distension.      Palpations: Abdomen is soft.      Tenderness: There is no abdominal tenderness.   Musculoskeletal:         General: Normal range of motion.   Skin:     General: Skin is warm.   Neurological:      General: No focal deficit present.      Mental Status: She is alert and oriented to person, place, and time.   Psychiatric:         Mood and Affect: Mood normal.         Behavior: Behavior normal.         Significant Labs:  All pertinent labs from the last 24 hours have been reviewed.    Significant Diagnostics:  I have reviewed all pertinent imaging results/findings within the past 24 hours.    Assessment/Plan:      77 y.o. 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 Most recently underwent ex-lap and attempted TIFFANY 3. Returns today with recurrent partial SBO    Admit to obs  NPO  IVF  NGT if emesis   GCC later this morning    Lai Castro MD  General Surgery PGYV

## 2022-05-29 ENCOUNTER — PATIENT MESSAGE (OUTPATIENT)
Dept: INTERNAL MEDICINE | Facility: CLINIC | Age: 77
End: 2022-05-29
Payer: MEDICARE

## 2022-05-29 VITALS
DIASTOLIC BLOOD PRESSURE: 57 MMHG | TEMPERATURE: 97 F | HEIGHT: 62 IN | WEIGHT: 126.31 LBS | OXYGEN SATURATION: 100 % | BODY MASS INDEX: 23.24 KG/M2 | HEART RATE: 85 BPM | RESPIRATION RATE: 18 BRPM | SYSTOLIC BLOOD PRESSURE: 105 MMHG

## 2022-05-29 LAB
ANION GAP SERPL CALC-SCNC: 10 MMOL/L (ref 8–16)
BASOPHILS # BLD AUTO: 0.03 K/UL (ref 0–0.2)
BASOPHILS NFR BLD: 0.8 % (ref 0–1.9)
BUN SERPL-MCNC: 11 MG/DL (ref 8–23)
CALCIUM SERPL-MCNC: 9.9 MG/DL (ref 8.7–10.5)
CHLORIDE SERPL-SCNC: 104 MMOL/L (ref 95–110)
CO2 SERPL-SCNC: 28 MMOL/L (ref 23–29)
CREAT SERPL-MCNC: 0.8 MG/DL (ref 0.5–1.4)
DIFFERENTIAL METHOD: ABNORMAL
EOSINOPHIL # BLD AUTO: 0.2 K/UL (ref 0–0.5)
EOSINOPHIL NFR BLD: 5.1 % (ref 0–8)
ERYTHROCYTE [DISTWIDTH] IN BLOOD BY AUTOMATED COUNT: 13.3 % (ref 11.5–14.5)
EST. GFR  (AFRICAN AMERICAN): >60 ML/MIN/1.73 M^2
EST. GFR  (NON AFRICAN AMERICAN): >60 ML/MIN/1.73 M^2
GLUCOSE SERPL-MCNC: 123 MG/DL (ref 70–110)
HCT VFR BLD AUTO: 38.6 % (ref 37–48.5)
HGB BLD-MCNC: 12.4 G/DL (ref 12–16)
IMM GRANULOCYTES # BLD AUTO: 0.01 K/UL (ref 0–0.04)
IMM GRANULOCYTES NFR BLD AUTO: 0.3 % (ref 0–0.5)
LYMPHOCYTES # BLD AUTO: 1.3 K/UL (ref 1–4.8)
LYMPHOCYTES NFR BLD: 34.1 % (ref 18–48)
MAGNESIUM SERPL-MCNC: 1.6 MG/DL (ref 1.6–2.6)
MCH RBC QN AUTO: 31.5 PG (ref 27–31)
MCHC RBC AUTO-ENTMCNC: 32.1 G/DL (ref 32–36)
MCV RBC AUTO: 98 FL (ref 82–98)
MONOCYTES # BLD AUTO: 0.4 K/UL (ref 0.3–1)
MONOCYTES NFR BLD: 9.2 % (ref 4–15)
NEUTROPHILS # BLD AUTO: 2 K/UL (ref 1.8–7.7)
NEUTROPHILS NFR BLD: 50.5 % (ref 38–73)
NRBC BLD-RTO: 0 /100 WBC
PHOSPHATE SERPL-MCNC: 4.2 MG/DL (ref 2.7–4.5)
PLATELET # BLD AUTO: 84 K/UL (ref 150–450)
PMV BLD AUTO: 11 FL (ref 9.2–12.9)
POCT GLUCOSE: 127 MG/DL (ref 70–110)
POTASSIUM SERPL-SCNC: 4.4 MMOL/L (ref 3.5–5.1)
RBC # BLD AUTO: 3.94 M/UL (ref 4–5.4)
SODIUM SERPL-SCNC: 142 MMOL/L (ref 136–145)
WBC # BLD AUTO: 3.93 K/UL (ref 3.9–12.7)

## 2022-05-29 PROCEDURE — G0378 HOSPITAL OBSERVATION PER HR: HCPCS

## 2022-05-29 PROCEDURE — 80048 BASIC METABOLIC PNL TOTAL CA: CPT | Performed by: STUDENT IN AN ORGANIZED HEALTH CARE EDUCATION/TRAINING PROGRAM

## 2022-05-29 PROCEDURE — 36415 COLL VENOUS BLD VENIPUNCTURE: CPT | Performed by: STUDENT IN AN ORGANIZED HEALTH CARE EDUCATION/TRAINING PROGRAM

## 2022-05-29 PROCEDURE — 84100 ASSAY OF PHOSPHORUS: CPT | Performed by: STUDENT IN AN ORGANIZED HEALTH CARE EDUCATION/TRAINING PROGRAM

## 2022-05-29 PROCEDURE — 25000003 PHARM REV CODE 250: Performed by: SURGERY

## 2022-05-29 PROCEDURE — 85025 COMPLETE CBC W/AUTO DIFF WBC: CPT | Performed by: STUDENT IN AN ORGANIZED HEALTH CARE EDUCATION/TRAINING PROGRAM

## 2022-05-29 PROCEDURE — 83735 ASSAY OF MAGNESIUM: CPT | Performed by: STUDENT IN AN ORGANIZED HEALTH CARE EDUCATION/TRAINING PROGRAM

## 2022-05-29 RX ORDER — PANTOPRAZOLE SODIUM 20 MG/1
20 TABLET, DELAYED RELEASE ORAL DAILY
Qty: 30 TABLET | Refills: 2 | Status: SHIPPED | OUTPATIENT
Start: 2022-05-29 | End: 2022-11-28

## 2022-05-29 RX ADMIN — POLYETHYLENE GLYCOL 3350 17 G: 17 POWDER, FOR SOLUTION ORAL at 08:05

## 2022-05-29 RX ADMIN — MONTELUKAST 10 MG: 10 TABLET, FILM COATED ORAL at 08:05

## 2022-05-29 RX ADMIN — PANTOPRAZOLE SODIUM 20 MG: 20 TABLET, DELAYED RELEASE ORAL at 08:05

## 2022-05-29 NOTE — HOSPITAL COURSE
Presented with a recurrent partial SBO. She was admitted to obs. Shortly after admission, she began having return of bowel function. She was started on a CLD which she tolerated without n/v. We planned for a GGC, but the hospital is out of gastrograffin and barium is the only other options. As she was having bowel function, we decided to hold off on the study. She was able to be advanced to a regular diet for dinner which she tolerated. On 5/29, she was having bowel function, her pain was controlled, and she did not have nausea. She was able to be discharged home

## 2022-05-29 NOTE — DISCHARGE SUMMARY
[facility]  General Surgery  Discharge Summary      Patient Name: Anayeli Judd  MRN: [id number]  Admission Date: 5/27/2022  Hospital Length of Stay: 0 days  Discharge Date and Time:  05/29/2022 8:05 AM  Attending Physician: Bianka Law MD   Discharging Provider: Manjit Jiang MD  Primary Care Provider: Darci Guthrie MD    HPI:   Anayeli Judd is a 77 y.o. 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 Most recently underwent ex-lap and attempted TIFFANY 3 months ago but the procedure was aborted due to dense adhesions. She presents to the ED with complaint of epigastric abdominal pain, nausea, and vomiting x1 day. These symptoms are similar to previous SBO episodes. Reports she is still passing some flatus and had loose BM yesterday.   Vitals and labs in the ED are unremarkable.      Surg Hx: Open transverse colectomy, open cholecystectomy   Social: Non-smoker     * No surgery found *      Indwelling Lines/Drains at time of discharge:   Lines/Drains/Airways     None               Hospital Course: Presented with a recurrent partial SBO. She was admitted to obs. Shortly after admission, she began having return of bowel function. She was started on a CLD which she tolerated without n/v. We planned for a GGC, but the hospital is out of gastrograffin and barium is the only other options. As she was having bowel function, we decided to hold off on the study. She was able to be advanced to a regular diet for dinner which she tolerated. On 5/29, she was having bowel function, her pain was controlled, and she did not have nausea. She was able to be discharged home    Physical Exam  Vitals and nursing note reviewed.   HENT:      Head: Normocephalic.      Mouth/Throat:      Mouth: Mucous membranes are moist  Eyes:      Extraocular Movements: Extraocular movements intact.      Pupils: Pupils are equal, round, and reactive to light.    Cardiovascular:      Rate and Rhythm: Normal rate and regular rhythm.      Pulses: Normal pulses.   Pulmonary:      Effort: Pulmonary effort is normal.   Abdominal:      General: There is mild distension.      Palpations: Abdomen is soft.      Tenderness: There is no abdominal tenderness.   Musculoskeletal:         General: Normal range of motion.   Skin:     General: Skin is warm.   Neurological:      General: No focal deficit present.      Mental Status: She is alert and oriented to person, place, and time.   Psychiatric:         Mood and Affect: Mood normal.         Behavior: Behavior normal.        Goals of Care Treatment Preferences:  Code Status: Full Code    Living Will: Yes              Consults:   Consults (From admission, onward)        Status Ordering Provider     Inpatient consult to Midline team  Once        Provider:  (Not yet assigned)    Acknowledged MARLI JUNG     Inpatient consult to General surgery  Once        Provider:  (Not yet assigned)    Completed DOMENIC SMALL Diagnostic Studies: Labs:   BMP:   Recent Labs   Lab 05/28/22  0046   *      K 4.4      CO2 20*   BUN 18   CREATININE 0.9   CALCIUM 10.5       Pending Diagnostic Studies:     Procedure Component Value Units Date/Time    Basic Metabolic Panel [[id number]] Collected: 05/29/22 0732    Order Status: Sent Lab Status: In process Updated: 05/29/22 0804    Specimen: Blood     CBC Auto Differential [[id number]] Collected: 05/29/22 0731    Order Status: Sent Lab Status: In process Updated: 05/29/22 0804    Specimen: Blood     Hepatitis C antibody [[id number]] Collected: 05/28/22 1220    Order Status: Sent Lab Status: In process Updated: 05/28/22 1223    Specimen: Blood     Magnesium [[id number]] Collected: 05/29/22 0732    Order Status: Sent Lab Status: In process Updated: 05/29/22 0804    Specimen: Blood     Phosphorus [[id number]] Collected: 05/29/22 0732    Order Status: Sent Lab Status: In process  Updated: 05/29/22 0804    Specimen: Blood         Final Active Diagnoses:    Diagnosis Date Noted POA    PRINCIPAL PROBLEM:  Generalized abdominal pain [R10.84] 04/17/2018 Yes      Problems Resolved During this Admission:      Discharged Condition: good    Disposition: Home or Self Care    Follow Up:   Follow-up Information     Leonidas Barriga MD Follow up.    Specialties: General Surgery, Bariatrics  Why: As needed  Contact information:  [address]  [place] [id number] [phone]                       Patient Instructions:      Diet Adult Regular     Notify your health care provider if you experience any of the following:  difficulty breathing or increased cough     Notify your health care provider if you experience any of the following:  redness, tenderness, or signs of infection (pain, swelling, redness, odor or green/yellow discharge around incision site)     Notify your health care provider if you experience any of the following:  severe uncontrolled pain     Notify your health care provider if you experience any of the following:  persistent nausea and vomiting or diarrhea     Notify your health care provider if you experience any of the following:  temperature >100.4     Activity as tolerated     Medications:  Reconciled Home Medications:      Medication List      CONTINUE taking these medications    acetaminophen 650 MG Tbsr  Commonly known as: TYLENOL  Take 650 mg by mouth once as needed (pain).     ascorbic acid (vitamin C) 1000 MG tablet  Commonly known as: VITAMIN C  Take 1,000 mg by mouth once daily.     atorvastatin 40 MG tablet  Commonly known as: LIPITOR  TAKE 1 TABLET BY MOUTH  DAILY     azelastine 137 mcg (0.1 %) nasal spray  Commonly known as: ASTELIN  2 sprays (274 mcg total) by Nasal route 2 (two) times daily as needed for Rhinitis.     biotin 10,000 mcg Tbdl  Take 1 tablet by mouth once daily.     calcium-vitamin D3 500 mg-5 mcg (200 unit) per tablet  Commonly known as:  OS-KASSANDRA 500 + D3  Take 1,200 tablets by mouth once daily.     cefUROXime 500 MG tablet  Commonly known as: CEFTIN  Take 1 tablet (500 mg total) by mouth every 12 (twelve) hours.     co-enzyme Q-10 30 mg capsule  Take 30 mg by mouth 3 (three) times daily.     cranberry extract 200 mg Cap  Take 1 capsule by mouth once daily.     D-MANNOSE ORAL  Take 1 tablet by mouth once daily.     diclofenac sodium 1 % Gel  Commonly known as: VOLTAREN  Apply 2 g topically 4 (four) times daily as needed (Back pain).     DULoxetine 30 MG capsule  Commonly known as: CYMBALTA  Take 1 capsule (30 mg total) by mouth 2 (two) times daily.     famotidine 20 MG tablet  Commonly known as: PEPCID  Take 1 tablet (20 mg total) by mouth 2 (two) times daily as needed for Heartburn.     fluticasone propionate 50 mcg/actuation nasal spray  Commonly known as: FLONASE  1 spray by Each Nostril route daily as needed for Rhinitis.     FREESTYLE EFREN 14 DAY READER Misc  Generic drug: flash glucose scanning reader  Check blood glucose level 3 times daily.     FREESTYLE EFREN 14 DAY SENSOR Kit  Generic drug: flash glucose sensor  1 application by Misc.(Non-Drug; Combo Route) route every 14 (fourteen) days. Disp 30 or 90 day refill     FREESTYLE LITE STRIPS Strp  Generic drug: blood sugar diagnostic  TEST DAILY AS DIRECTED     gabapentin 600 MG tablet  Commonly known as: NEURONTIN  Take 1 tablet (600 mg total) by mouth 2 (two) times daily.     hydrocortisone 2.5 % cream  Apply topically 2 (two) times daily as needed.     hyoscyamine 0.125 mg Tab  Commonly known as: ANASPAZ,LEVSIN  TAKE 1 TABLET(125 MCG) BY MOUTH EVERY 8 HOURS AS NEEDED FOR URGENCY     levothyroxine 75 MCG tablet  Commonly known as: SYNTHROID  Take 1 tablet (75 mcg total) by mouth before breakfast.     LIDOcaine 5 %  Commonly known as: LIDODERM  Place 1 patch onto the skin once daily. Remove & Discard patch within 12 hours or as directed by MD     magnesium 250 mg Tab  Take 1,000 mg by mouth  once daily.     metFORMIN 500 MG tablet  Commonly known as: GLUCOPHAGE  TAKE 1 TABLET(500 MG) BY MOUTH DAILY WITH BREAKFAST     montelukast 10 mg tablet  Commonly known as: SINGULAIR  TAKE 1 TABLET BY MOUTH  DAILY     ondansetron 8 MG Tbdl  Commonly known as: ZOFRAN-ODT  Take 1 tablet (8 mg total) by mouth every 8 (eight) hours as needed (nausea).     pantoprazole 20 MG tablet  Commonly known as: PROTONIX  TAKE 1 TABLET(20 MG) BY MOUTH EVERY DAY     polyethylene glycol 17 gram/dose powder  Commonly known as: GLYCOLAX  Take 17 g by mouth before dinner.     PREMARIN vaginal cream  Generic drug: conjugated estrogens  INSERT 1/2 GRAM VAGINALLY  TWICE WEEKLY     vitamin D 1000 units Tab  Commonly known as: VITAMIN D3  Take 1,000 Units by mouth once daily. D3          Time spent on the discharge of patient: 15 minutes    Manjit Jiang MD  General Surgery  [facility] - Surgery

## 2022-05-29 NOTE — NURSING
Patient teaching done with daughter at bedside. Patient discharged to POV with all belongings. Patient left via wheelchair escorted by daughter.

## 2022-05-29 NOTE — NURSING
Handoff report received, patient in bed awake.    2042 Called anesthesia to insert IV, after several attempts made by several different nurses. Stated will be by later.    0430 Anesthesia has not come by, called again, stated he is busy at this time. Called DIT for IV.    0701 Blood glucose 127

## 2022-05-30 LAB — HCV AB SERPL QL IA: NEGATIVE

## 2022-05-31 ENCOUNTER — OFFICE VISIT (OUTPATIENT)
Dept: NEUROLOGY | Facility: CLINIC | Age: 77
End: 2022-05-31
Payer: MEDICARE

## 2022-05-31 VITALS
HEART RATE: 68 BPM | BODY MASS INDEX: 23.59 KG/M2 | SYSTOLIC BLOOD PRESSURE: 126 MMHG | DIASTOLIC BLOOD PRESSURE: 78 MMHG | WEIGHT: 129 LBS

## 2022-05-31 DIAGNOSIS — G31.84 MILD COGNITIVE IMPAIRMENT: Primary | ICD-10-CM

## 2022-05-31 DIAGNOSIS — G20.C PARKINSONISM, UNSPECIFIED PARKINSONISM TYPE: ICD-10-CM

## 2022-05-31 PROCEDURE — 99213 OFFICE O/P EST LOW 20 MIN: CPT | Mod: S$PBB,,, | Performed by: PSYCHIATRY & NEUROLOGY

## 2022-05-31 PROCEDURE — 99999 PR PBB SHADOW E&M-EST. PATIENT-LVL IV: ICD-10-PCS | Mod: PBBFAC,,, | Performed by: PSYCHIATRY & NEUROLOGY

## 2022-05-31 PROCEDURE — 99214 OFFICE O/P EST MOD 30 MIN: CPT | Mod: PBBFAC | Performed by: PSYCHIATRY & NEUROLOGY

## 2022-05-31 PROCEDURE — 99213 PR OFFICE/OUTPT VISIT, EST, LEVL III, 20-29 MIN: ICD-10-PCS | Mod: S$PBB,,, | Performed by: PSYCHIATRY & NEUROLOGY

## 2022-05-31 PROCEDURE — 99999 PR PBB SHADOW E&M-EST. PATIENT-LVL IV: CPT | Mod: PBBFAC,,, | Performed by: PSYCHIATRY & NEUROLOGY

## 2022-05-31 RX ORDER — DONEPEZIL HYDROCHLORIDE 5 MG/1
5 TABLET, FILM COATED ORAL NIGHTLY
Qty: 30 TABLET | Refills: 11 | Status: SHIPPED | OUTPATIENT
Start: 2022-05-31 | End: 2022-07-20

## 2022-05-31 NOTE — PROGRESS NOTES
"  Name: Anayeli Judd  MRN: [id number]   CSN: [id number]      Date: 05/31/2022    Referring physician:  No referring provider defined for this encounter.    Chief Complaint: here with [place], caregiver  - here for f/u, saw Zehra last year, then saw Dr. Montero in Jan 2022  - mild memory changes in association with her parkinsonism, unclear if worsened over 4-5 months   - [place] is more complimentary than that - seems more age-appropriate to her!  - was NOT started on donepezil as discussed with Dr. Montero - and she agrees that her forgetful      History of Present Illness (HPI): Anayeli Judd is a R HANDED 77 y.o. female with a medical issues significant for DMII, lumbar spondylosis, hx of colon cancer, IBS, fatty liver, GERD, B12 deficiency, myelodysplastic syndrome, and hypothyroidism who presents for tremors. Accompanied by daughter. First noticed a tremor within the last year, comes and goes. Slowly getting worse. Tremor at rest as well as with action and posture. Worse in the right hand. Left hand tremor started a month or so after. + Micrographia, Balance issues, shuffling her feet. Sometimes almost falls forwards, sometimes walks tilted to the right. A couple of bad falls in the past couple of years, no recent falls. Difficulty when going from sitting to standing. Some difficulty with turns. Daughter states that she has slowed down drastically over the last year. Denies difficulty with fine motor skills.   Long term intact, short term memory more of an issue. Forgets conversations. Forgets names, some difficulty with word finding. Forgets if she has taken her medication.   Lightheaded/dizzy when she goes from sitting to standing, has always had low blood pressure but not symptomatic until this last year. Denies paranoia, feels very safe at home.     From visit with PCP in July 2021  "worried about tremor and has had some mem loss. thinks she is stiff too and her signature has changed sometimes when she writes.  " "No bowel issues and feels this is better overall.  Always hungry. She did cut back on the thryoid after her last visit.  bs are high but did have banana and toast today and it was about 200.  sisther dx with met breast cancer and this has been stressful."      Family History: brother with PDism, father with memory issues     Neuroleptic Exposure: none     Nonmotor/Premotor ROS:  Anosmia: normal   Dysarthria/Hypophonia: softening of her voice, hard to hear her   Dysphagia/Sialorrhea: + sialorrhea, mostly at night, occasional dysphagia, mostly with solids  Depression: + apathy in the past year   Cognitive slowing:  As above   Hallucinations: woke up and thought she saw her dog in the room (dog  40 years ago), heard her under the bed, "looked up at her"  woke up to put her alarm on in her house (hasnt had an alarm in her house for 20 years). Has recurrent UTIs, may have happened at the same time as a UTI.   Urinary changes:  On myrbetriq   Constipation: yes, chronic, hx of colon cancer, severe scar tissue that results in obstructions, 3-4 hospital visits a year   Orthostasis: yes, has always had low blood pressure, now feeling lightheaded and dizzy when she goes from sitting to standing   Falls: none   Freezing:  None   Micrographia: yes   Sleep issues:  -RBD: not sure, lives alone -- does have vivid dreams     Review of Systems:   Review of Systems   Constitutional: Negative for chills, fever and malaise/fatigue.   HENT: Negative for hearing loss.    Eyes: Negative for blurred vision and double vision.   Respiratory: Negative for cough, shortness of breath and stridor.    Cardiovascular: Negative for chest pain and leg swelling.   Gastrointestinal: Positive for constipation. Negative for diarrhea and nausea.   Genitourinary: Positive for urgency. Negative for frequency.   Musculoskeletal: Negative for falls.   Skin: Negative for itching and rash.   Neurological: Positive for dizziness and tremors. Negative for " loss of consciousness and weakness.   Psychiatric/Behavioral: Positive for hallucinations and memory loss.           Past Medical History: The patient  has a past medical history of Abdominal pain, Allergic rhinitis, Arthritis, Blood platelet disorder, Blood platelet disorder, Blood transfusion, Bowel obstruction, Cervical radiculopathy, Colon cancer (2011), Diabetes mellitus, Diarrhea, Falls (01/2020), Family history of breast cancer, Family history of colon cancer, Fatty liver, GERD (gastroesophageal reflux disease), History of shingles, Hyperlipidemia, Hypothyroidism, Irritable bowel syndrome, Microscopic colitis, Myelodysplastic syndrome, Raynaud phenomenon, Raynaud's disease, and Type 2 diabetes mellitus.    Social History: The patient  reports that she has never smoked. She has never used smokeless tobacco. She reports previous alcohol use of about 3.0 standard drinks of alcohol per week. She reports that she does not use drugs.    Family History: Their family history includes Alcohol abuse in her brother, brother, and brother; Arthritis in her brother, brother, and daughter; Asthma in her daughter and daughter; Bladder Cancer in her mother; Breast cancer (age of onset: 79) in her sister; Cataracts in her mother; Colon cancer in her father; Colon cancer (age of onset: 70) in her brother; Depression in her daughter, daughter, and daughter; Glaucoma in her mother; Heart disease in her mother; Heart disease (age of onset: 50) in her father; Hyperlipidemia in her mother; Hypertension in her daughter; Kidney disease in her mother; Parkinsonism in her brother; Stroke in her daughter; Stroke (age of onset: 40) in her daughter.    Allergies: Codeine; Dilaudid [hydromorphone (bulk)]; Percocet [oxycodone-acetaminophen]; Sulfa (sulfonamide antibiotics); and Latex, natural rubber     Meds:   Current Outpatient Medications on File Prior to Visit   Medication Sig Dispense Refill    acetaminophen (TYLENOL) 650 MG TbSR Take  650 mg by mouth once as needed (pain).      ascorbic acid, vitamin C, (VITAMIN C) 1000 MG tablet Take 1,000 mg by mouth once daily.      atorvastatin (LIPITOR) 40 MG tablet TAKE 1 TABLET BY MOUTH  DAILY 90 tablet 3    azelastine (ASTELIN) 137 mcg (0.1 %) nasal spray 2 sprays (274 mcg total) by Nasal route 2 (two) times daily as needed for Rhinitis.      biotin 10,000 mcg TbDL Take 1 tablet by mouth once daily.       calcium-vitamin D3 (OS-KASSANDRA 500 + D3) 500 mg-5 mcg (200 unit) per tablet Take 1,200 tablets by mouth once daily.      cefUROXime (CEFTIN) 500 MG tablet Take 1 tablet (500 mg total) by mouth every 12 (twelve) hours. 10 tablet 0    co-enzyme Q-10 30 mg capsule Take 30 mg by mouth 3 (three) times daily.      conjugated estrogens (PREMARIN) vaginal cream INSERT 1/2 GRAM VAGINALLY  TWICE WEEKLY 30 g 3    cranberry extract 200 mg Cap Take 1 capsule by mouth once daily.      D-MANNOSE ORAL Take 1 tablet by mouth once daily.       diclofenac sodium (VOLTAREN) 1 % Gel Apply 2 g topically 4 (four) times daily as needed (Back pain). 350 g 2    DULoxetine (CYMBALTA) 30 MG capsule Take 1 capsule (30 mg total) by mouth 2 (two) times daily. 180 capsule 3    famotidine (PEPCID) 20 MG tablet Take 1 tablet (20 mg total) by mouth 2 (two) times daily as needed for Heartburn. 60 tablet 2    flash glucose scanning reader (FREESTYLE EFREN 14 DAY READER) Misc Check blood glucose level 3 times daily. 1 each 0    flash glucose sensor (FREESTYLE EFREN 14 DAY SENSOR) Kit 1 application by Misc.(Non-Drug; Combo Route) route every 14 (fourteen) days. Disp 30 or 90 day refill 6 kit 3    fluticasone propionate (FLONASE) 50 mcg/actuation nasal spray 1 spray by Each Nostril route daily as needed for Rhinitis.      FREESTYLE LITE STRIPS Strp TEST DAILY AS DIRECTED 50 strip 2    gabapentin (NEURONTIN) 600 MG tablet Take 1 tablet (600 mg total) by mouth 2 (two) times daily. 180 tablet 3    hydrocortisone 2.5 % cream Apply  topically 2 (two) times daily as needed. 1 each 1    levothyroxine (SYNTHROID) 75 MCG tablet Take 1 tablet (75 mcg total) by mouth before breakfast. 90 tablet 3    LIDOcaine (LIDODERM) 5 % Place 1 patch onto the skin once daily. Remove & Discard patch within 12 hours or as directed by MD 30 patch 0    magnesium 250 mg Tab Take 1,000 mg by mouth once daily.      metFORMIN (GLUCOPHAGE) 500 MG tablet TAKE 1 TABLET(500 MG) BY MOUTH DAILY WITH BREAKFAST 90 tablet 1    montelukast (SINGULAIR) 10 mg tablet TAKE 1 TABLET BY MOUTH  DAILY 90 tablet 3    ondansetron (ZOFRAN-ODT) 8 MG TbDL Take 1 tablet (8 mg total) by mouth every 8 (eight) hours as needed (nausea). 30 tablet 0    pantoprazole (PROTONIX) 20 MG tablet Take 1 tablet (20 mg total) by mouth once daily. 30 tablet 2    polyethylene glycol (GLYCOLAX) 17 gram/dose powder Take 17 g by mouth before dinner. 1530 g 1    vitamin D 1000 units Tab Take 1,000 Units by mouth once daily. D3      hyoscyamine (ANASPAZ,LEVSIN) 0.125 mg Tab TAKE 1 TABLET(125 MCG) BY MOUTH EVERY 8 HOURS AS NEEDED FOR URGENCY 270 tablet 3     No current facility-administered medications on file prior to visit.       Exam:  /78   Pulse 68   Wt 58.5 kg (128 lb 15.5 oz)   LMP  (LMP Unknown)   BMI 23.59 kg/m²     Constitutional  Well-developed, well-nourished, appears stated age   Ophthalmoscopic  No papilledema with no hemorrhages or exudates bilaterally   Cardiovascular  Radial pulses 2+ and symmetric, no LE edema bilaterally   Neurological    * Mental status  MOCA =      - Orientation  Oriented to person, place, time, and situation     - Memory   Intact recent and remote     - Attention/concentration  Attentive, vigilant during exam     - Language  Naming & repetition intact, +2-step commands     - Fund of knowledge  Aware of current events     - Executive  Well-organized thoughts     - Other     * Cranial nerves       - CN II  PERRL, visual fields full to confrontation     - CN  III, IV, VI  Extraocular movements full, normal pursuits and saccades     - CN V  Sensation V1 - V3 intact     - CN VII  Face strong and symmetric bilaterally     - CN VIII  Hearing intact bilaterally     - CN IX, X  Palate raises midline and symmetric     - CN XI  SCM and trapezius 5/5 bilaterally     - CN XII  Tongue midline   * Motor  Muscle bulk normal, strength 5/5 throughout   * Sensory   Intact to temperature and vibration throughout   * Coordination  No dysmetria with finger-to-nose or heel-to-shin   * Gait  See below.   * Deep tendon reflexes  3+ and symmetric throughout   R patellar 3+ > L    fernández absent    Babinski downgoing bilaterally   * Specialized movement exam  mild hypophonic speech.    mod facial masking.   mild cogwheel rigidity of RUE on distraction    cogwheel rigidity of b/l LE, L > R    mild bradykinesia, does not decrement     No other dystonia, chorea, athetosis, myoclonus, or tics.   No motor impersistence.   Narrow- based gait, shuffling gait     + shortened stride length.   decreased R arm swing      resting R hand tremor    R re-emergent tremor      Laboratory/Radiological:  - Results:  Admission on 05/27/2022, Discharged on 05/29/2022   Component Date Value Ref Range Status    WBC 05/28/2022 8.71  3.90 - 12.70 K/uL Final    RBC 05/28/2022 4.25  4.00 - 5.40 M/uL Final    Hemoglobin 05/28/2022 13.5  12.0 - 16.0 g/dL Final    Hematocrit 05/28/2022 40.8  37.0 - 48.5 % Final    MCV 05/28/2022 96  82 - 98 fL Final    MCH 05/28/2022 31.8 (A) 27.0 - 31.0 pg Final    MCHC 05/28/2022 33.1  32.0 - 36.0 g/dL Final    RDW 05/28/2022 13.4  11.5 - 14.5 % Final    Platelets 05/28/2022 78 (A) 150 - 450 K/uL Final    MPV 05/28/2022 11.0  9.2 - 12.9 fL Final    Immature Granulocytes 05/28/2022 0.2  0.0 - 0.5 % Final    Gran # (ANC) 05/28/2022 6.5  1.8 - 7.7 K/uL Final    Immature Grans (Abs) 05/28/2022 0.02  0.00 - 0.04 K/uL Final    Lymph # 05/28/2022 1.4  1.0 - 4.8 K/uL Final    Mono  # 05/28/2022 0.5  0.3 - 1.0 K/uL Final    Eos # 05/28/2022 0.2  0.0 - 0.5 K/uL Final    Baso # 05/28/2022 0.06  0.00 - 0.20 K/uL Final    nRBC 05/28/2022 0  0 /100 WBC Final    Gran % 05/28/2022 75.1 (A) 38.0 - 73.0 % Final    Lymph % 05/28/2022 15.7 (A) 18.0 - 48.0 % Final    Mono % 05/28/2022 6.1  4.0 - 15.0 % Final    Eosinophil % 05/28/2022 2.2  0.0 - 8.0 % Final    Basophil % 05/28/2022 0.7  0.0 - 1.9 % Final    Differential Method 05/28/2022 Automated   Final    Sodium 05/28/2022 137  136 - 145 mmol/L Final    Potassium 05/28/2022 4.4  3.5 - 5.1 mmol/L Final    Chloride 05/28/2022 102  95 - 110 mmol/L Final    CO2 05/28/2022 20 (A) 23 - 29 mmol/L Final    Glucose 05/28/2022 177 (A) 70 - 110 mg/dL Final    BUN 05/28/2022 18  8 - 23 mg/dL Final    Creatinine 05/28/2022 0.9  0.5 - 1.4 mg/dL Final    Calcium 05/28/2022 10.5  8.7 - 10.5 mg/dL Final    Total Protein 05/28/2022 7.7  6.0 - 8.4 g/dL Final    Albumin 05/28/2022 4.4  3.5 - 5.2 g/dL Final    Total Bilirubin 05/28/2022 1.7 (A) 0.1 - 1.0 mg/dL Final    Alkaline Phosphatase 05/28/2022 72  55 - 135 U/L Final    AST 05/28/2022 32  10 - 40 U/L Final    ALT 05/28/2022 29  10 - 44 U/L Final    Anion Gap 05/28/2022 15  8 - 16 mmol/L Final    eGFR if African American 05/28/2022 >60.0  >60 mL/min/1.73 m^2 Final    eGFR if non African American 05/28/2022 >60.0  >60 mL/min/1.73 m^2 Final    Lipase 05/28/2022 30  4 - 60 U/L Final    Specimen UA 05/28/2022 Urine, Clean Catch   Final    Color, UA 05/28/2022 Straw  Yellow, Straw, Vangie Final    Appearance, UA 05/28/2022 Clear  Clear Final    pH, UA 05/28/2022 6.0  5.0 - 8.0 Final    Specific Gravity, UA 05/28/2022 1.005  1.005 - 1.030 Final    Protein, UA 05/28/2022 Negative  Negative Final    Glucose, UA 05/28/2022 Negative  Negative Final    Ketones, UA 05/28/2022 Negative  Negative Final    Bilirubin (UA) 05/28/2022 Negative  Negative Final    Occult Blood UA 05/28/2022 Negative   Negative Final    Nitrite, UA 05/28/2022 Negative  Negative Final    Leukocytes, UA 05/28/2022 Negative  Negative Final    POC Rapid COVID 05/28/2022 Negative  Negative Final     Acceptable 05/28/2022 Yes   Final    POC Glucose 05/28/2022 175 (A) 70 - 110 mg/dL Final    POC BUN 05/28/2022 20  6 - 30 mg/dL Final    POC Creatinine 05/28/2022 0.9  0.5 - 1.4 mg/dL Final    POC Sodium 05/28/2022 136  136 - 145 mmol/L Final    POC Potassium 05/28/2022 4.2  3.5 - 5.1 mmol/L Final    POC Chloride 05/28/2022 104  95 - 110 mmol/L Final    POC TCO2 (MEASURED) 05/28/2022 21 (A) 23 - 29 mmol/L Final    POC Ionized Calcium 05/28/2022 1.12  1.06 - 1.42 mmol/L Final    POC Hematocrit 05/28/2022 40  36 - 54 %PCV Final    Sample 05/28/2022 JUANITO   Final    POCT Glucose 05/28/2022 148 (A) 70 - 110 mg/dL Final    Hepatitis C Ab 05/28/2022 Negative  Negative Final    POCT Glucose 05/28/2022 143 (A) 70 - 110 mg/dL Final    POCT Glucose 05/28/2022 148 (A) 70 - 110 mg/dL Final    WBC 05/29/2022 3.93  3.90 - 12.70 K/uL Final    RBC 05/29/2022 3.94 (A) 4.00 - 5.40 M/uL Final    Hemoglobin 05/29/2022 12.4  12.0 - 16.0 g/dL Final    Hematocrit 05/29/2022 38.6  37.0 - 48.5 % Final    MCV 05/29/2022 98  82 - 98 fL Final    MCH 05/29/2022 31.5 (A) 27.0 - 31.0 pg Final    MCHC 05/29/2022 32.1  32.0 - 36.0 g/dL Final    RDW 05/29/2022 13.3  11.5 - 14.5 % Final    Platelets 05/29/2022 84 (A) 150 - 450 K/uL Final    MPV 05/29/2022 11.0  9.2 - 12.9 fL Final    Immature Granulocytes 05/29/2022 0.3  0.0 - 0.5 % Final    Gran # (ANC) 05/29/2022 2.0  1.8 - 7.7 K/uL Final    Immature Grans (Abs) 05/29/2022 0.01  0.00 - 0.04 K/uL Final    Lymph # 05/29/2022 1.3  1.0 - 4.8 K/uL Final    Mono # 05/29/2022 0.4  0.3 - 1.0 K/uL Final    Eos # 05/29/2022 0.2  0.0 - 0.5 K/uL Final    Baso # 05/29/2022 0.03  0.00 - 0.20 K/uL Final    nRBC 05/29/2022 0  0 /100 WBC Final    Gran % 05/29/2022 50.5  38.0 - 73.0 % Final  "   Lymph % 05/29/2022 34.1  18.0 - 48.0 % Final    Mono % 05/29/2022 9.2  4.0 - 15.0 % Final    Eosinophil % 05/29/2022 5.1  0.0 - 8.0 % Final    Basophil % 05/29/2022 0.8  0.0 - 1.9 % Final    Differential Method 05/29/2022 Automated   Final    Sodium 05/29/2022 142  136 - 145 mmol/L Final    Potassium 05/29/2022 4.4  3.5 - 5.1 mmol/L Final    Chloride 05/29/2022 104  95 - 110 mmol/L Final    CO2 05/29/2022 28  23 - 29 mmol/L Final    Glucose 05/29/2022 123 (A) 70 - 110 mg/dL Final    BUN 05/29/2022 11  8 - 23 mg/dL Final    Creatinine 05/29/2022 0.8  0.5 - 1.4 mg/dL Final    Calcium 05/29/2022 9.9  8.7 - 10.5 mg/dL Final    Anion Gap 05/29/2022 10  8 - 16 mmol/L Final    eGFR if African American 05/29/2022 >60.0  >60 mL/min/1.73 m^2 Final    eGFR if non African American 05/29/2022 >60.0  >60 mL/min/1.73 m^2 Final    Magnesium 05/29/2022 1.6  1.6 - 2.6 mg/dL Final    Phosphorus 05/29/2022 4.2  2.7 - 4.5 mg/dL Final    POCT Glucose 05/29/2022 127 (A) 70 - 110 mg/dL Final   Admission on 05/04/2022, Discharged on 05/04/2022   Component Date Value Ref Range Status    Final Pathologic Diagnosis 05/04/2022    Final                    Value:RELIAPA DIAGNOSIS:  A.   DUODENUM, BIOPSY:         -Benign duodenal  mucosa with no pathologic abnormalities.         -No evidence of villous architectural distortion, increased  intraepithelial lymphocytes, active inflammation, granulomas or malignancy.  B.   STOMACH, BIOPSY:         -Antral-type mucosa with chronic inactive gastritis.         -No Helicobacter-type organisms identified on the *Helicobacter stain.  C.   2 MM DESCENDING COLON POLYP, POLYPECTOMY:         -Polypoid colonic mucosa with lymphoid aggregates.         -Multiple levels examined.  Emely Linda, M.D.  Report attached.  Performing site:  [facility]  [address] [place] [address]  "Disclaimer:  This case diagnosis was rendered completely by the outside  consultation pathologist and " "the case is electronically signed by an [facility]  pathologist listed below solely to release the report into the medical  record."      Disclaimer 05/04/2022    Final                    Value:Unless the case is a 'gross only' or additional testing only, the final  diagnosis for each specimen is based on a microscopic examination of  appropriate tissue sections.      POCT Glucose 05/04/2022 147 (A) 70 - 110 mg/dL Final   Lab Visit on 04/13/2022   Component Date Value Ref Range Status    Hemoglobin A1C 04/13/2022 6.9 (A) 4.0 - 5.6 % Final    Estimated Avg Glucose 04/13/2022 151 (A) 68 - 131 mg/dL Final    WBC 04/13/2022 5.29  3.90 - 12.70 K/uL Final    RBC 04/13/2022 3.97 (A) 4.00 - 5.40 M/uL Final    Hemoglobin 04/13/2022 12.5  12.0 - 16.0 g/dL Final    Hematocrit 04/13/2022 38.5  37.0 - 48.5 % Final    MCV 04/13/2022 97  82 - 98 fL Final    MCH 04/13/2022 31.5 (A) 27.0 - 31.0 pg Final    MCHC 04/13/2022 32.5  32.0 - 36.0 g/dL Final    RDW 04/13/2022 12.9  11.5 - 14.5 % Final    Platelets 04/13/2022 76 (A) 150 - 450 K/uL Final    MPV 04/13/2022 11.3  9.2 - 12.9 fL Final    Sodium 04/13/2022 136  136 - 145 mmol/L Final    Potassium 04/13/2022 4.8  3.5 - 5.1 mmol/L Final    Chloride 04/13/2022 99  95 - 110 mmol/L Final    CO2 04/13/2022 28  23 - 29 mmol/L Final    Glucose 04/13/2022 138 (A) 70 - 110 mg/dL Final    BUN 04/13/2022 13  8 - 23 mg/dL Final    Creatinine 04/13/2022 0.8  0.5 - 1.4 mg/dL Final    Calcium 04/13/2022 10.6 (A) 8.7 - 10.5 mg/dL Final    Anion Gap 04/13/2022 9  8 - 16 mmol/L Final    eGFR if African American 04/13/2022 >60.0  >60 mL/min/1.73 m^2 Final    eGFR if non African American 04/13/2022 >60.0  >60 mL/min/1.73 m^2 Final   Lab Visit on 04/04/2022   Component Date Value Ref Range Status    Specimen UA 04/04/2022 Urine, Clean Catch   Final    Color, UA 04/04/2022 Yellow  Yellow, Straw, Vangie Final    Appearance, UA 04/04/2022 Clear  Clear Final    pH, UA 04/04/2022 " 6.0  5.0 - 8.0 Final    Specific Gravity, UA 04/04/2022 1.010  1.005 - 1.030 Final    Protein, UA 04/04/2022 Negative  Negative Final    Glucose, UA 04/04/2022 Negative  Negative Final    Ketones, UA 04/04/2022 Negative  Negative Final    Bilirubin (UA) 04/04/2022 Negative  Negative Final    Occult Blood UA 04/04/2022 Negative  Negative Final    Nitrite, UA 04/04/2022 Negative  Negative Final    Leukocytes, UA 04/04/2022 Trace (A) Negative Final    RBC, UA 04/04/2022 1  0 - 4 /hpf Final    WBC, UA 04/04/2022 16 (A) 0 - 5 /hpf Final    WBC Clumps, UA 04/04/2022 Occasional (A) None-Rare Final    Microscopic Comment 04/04/2022 SEE COMMENT   Final    Urine Culture, Routine 04/04/2022 No growth   Final   Lab Visit on 03/21/2022   Component Date Value Ref Range Status    WBC 03/21/2022 4.86  3.90 - 12.70 K/uL Final    RBC 03/21/2022 3.66 (A) 4.00 - 5.40 M/uL Final    Hemoglobin 03/21/2022 11.7 (A) 12.0 - 16.0 g/dL Final    Hematocrit 03/21/2022 36.8 (A) 37.0 - 48.5 % Final    MCV 03/21/2022 101 (A) 82 - 98 fL Final    MCH 03/21/2022 32.0 (A) 27.0 - 31.0 pg Final    MCHC 03/21/2022 31.8 (A) 32.0 - 36.0 g/dL Final    RDW 03/21/2022 12.9  11.5 - 14.5 % Final    Platelets 03/21/2022 102 (A) 150 - 450 K/uL Final    MPV 03/21/2022 11.5  9.2 - 12.9 fL Final    Immature Granulocytes 03/21/2022 0.2  0.0 - 0.5 % Final    Gran # (ANC) 03/21/2022 2.7  1.8 - 7.7 K/uL Final    Immature Grans (Abs) 03/21/2022 0.01  0.00 - 0.04 K/uL Final    Lymph # 03/21/2022 1.3  1.0 - 4.8 K/uL Final    Mono # 03/21/2022 0.5  0.3 - 1.0 K/uL Final    Eos # 03/21/2022 0.3  0.0 - 0.5 K/uL Final    Baso # 03/21/2022 0.04  0.00 - 0.20 K/uL Final    nRBC 03/21/2022 0  0 /100 WBC Final    Gran % 03/21/2022 55.8  38.0 - 73.0 % Final    Lymph % 03/21/2022 27.6  18.0 - 48.0 % Final    Mono % 03/21/2022 10.5  4.0 - 15.0 % Final    Eosinophil % 03/21/2022 5.1  0.0 - 8.0 % Final    Basophil % 03/21/2022 0.8  0.0 - 1.9 % Final     Differential Method 03/21/2022 Automated   Final    Sodium 03/21/2022 137  136 - 145 mmol/L Final    Potassium 03/21/2022 4.4  3.5 - 5.1 mmol/L Final    Chloride 03/21/2022 101  95 - 110 mmol/L Final    CO2 03/21/2022 26  23 - 29 mmol/L Final    Glucose 03/21/2022 204 (A) 70 - 110 mg/dL Final    BUN 03/21/2022 11  8 - 23 mg/dL Final    Creatinine 03/21/2022 0.9  0.5 - 1.4 mg/dL Final    Calcium 03/21/2022 10.0  8.7 - 10.5 mg/dL Final    Total Protein 03/21/2022 7.3  6.0 - 8.4 g/dL Final    Albumin 03/21/2022 3.8  3.5 - 5.2 g/dL Final    Total Bilirubin 03/21/2022 1.2 (A) 0.1 - 1.0 mg/dL Final    Alkaline Phosphatase 03/21/2022 75  55 - 135 U/L Final    AST 03/21/2022 30  10 - 40 U/L Final    ALT 03/21/2022 28  10 - 44 U/L Final    Anion Gap 03/21/2022 10  8 - 16 mmol/L Final    eGFR if African American 03/21/2022 >60.0  >60 mL/min/1.73 m^2 Final    eGFR if non African American 03/21/2022 >60.0  >60 mL/min/1.73 m^2 Final   Lab Visit on 03/17/2022   Component Date Value Ref Range Status    WBC 03/17/2022 7.32  3.90 - 12.70 K/uL Final    RBC 03/17/2022 3.37 (A) 4.00 - 5.40 M/uL Final    Hemoglobin 03/17/2022 11.0 (A) 12.0 - 16.0 g/dL Final    Hematocrit 03/17/2022 34.6 (A) 37.0 - 48.5 % Final    MCV 03/17/2022 103 (A) 82 - 98 fL Final    MCH 03/17/2022 32.6 (A) 27.0 - 31.0 pg Final    MCHC 03/17/2022 31.8 (A) 32.0 - 36.0 g/dL Final    RDW 03/17/2022 13.2  11.5 - 14.5 % Final    Platelets 03/17/2022 86 (A) 150 - 450 K/uL Final    MPV 03/17/2022 11.4  9.2 - 12.9 fL Final    Immature Granulocytes 03/17/2022 0.1  0.0 - 0.5 % Final    Gran # (ANC) 03/17/2022 5.6  1.8 - 7.7 K/uL Final    Immature Grans (Abs) 03/17/2022 0.01  0.00 - 0.04 K/uL Final    Lymph # 03/17/2022 1.1  1.0 - 4.8 K/uL Final    Mono # 03/17/2022 0.4  0.3 - 1.0 K/uL Final    Eos # 03/17/2022 0.2  0.0 - 0.5 K/uL Final    Baso # 03/17/2022 0.04  0.00 - 0.20 K/uL Final    nRBC 03/17/2022 0  0 /100 WBC Final    Gran %  03/17/2022 76.3 (A) 38.0 - 73.0 % Final    Lymph % 03/17/2022 14.5 (A) 18.0 - 48.0 % Final    Mono % 03/17/2022 5.7  4.0 - 15.0 % Final    Eosinophil % 03/17/2022 2.9  0.0 - 8.0 % Final    Basophil % 03/17/2022 0.5  0.0 - 1.9 % Final    Differential Method 03/17/2022 Automated   Final    Ferritin 03/17/2022 276  20.0 - 300.0 ng/mL Final    Iron 03/17/2022 58  30 - 160 ug/dL Final    Transferrin 03/17/2022 281  200 - 375 mg/dL Final    TIBC 03/17/2022 416  250 - 450 ug/dL Final    Saturated Iron 03/17/2022 14 (A) 20 - 50 % Final    Sodium 03/17/2022 140  136 - 145 mmol/L Final    Potassium 03/17/2022 4.2  3.5 - 5.1 mmol/L Final    Chloride 03/17/2022 104  95 - 110 mmol/L Final    CO2 03/17/2022 27  23 - 29 mmol/L Final    Glucose 03/17/2022 138 (A) 70 - 110 mg/dL Final    BUN 03/17/2022 15  8 - 23 mg/dL Final    Creatinine 03/17/2022 0.8  0.5 - 1.4 mg/dL Final    Calcium 03/17/2022 9.9  8.7 - 10.5 mg/dL Final    Anion Gap 03/17/2022 9  8 - 16 mmol/L Final    eGFR if African American 03/17/2022 >60.0  >60 mL/min/1.73 m^2 Final    eGFR if non African American 03/17/2022 >60.0  >60 mL/min/1.73 m^2 Final    Lipase 03/17/2022 22  4 - 60 U/L Final   Clinical Support on 03/17/2022   Component Date Value Ref Range Status    Specimen UA 03/17/2022 Urine, Unspecified   Final    Color, UA 03/17/2022 Yellow  Yellow, Straw, Vangie Final    Appearance, UA 03/17/2022 Cloudy (A) Clear Final    pH, UA 03/17/2022 6.0  5.0 - 8.0 Final    Specific Gravity, UA 03/17/2022 1.015  1.005 - 1.030 Final    Protein, UA 03/17/2022 2+ (A) Negative Final    Glucose, UA 03/17/2022 Negative  Negative Final    Ketones, UA 03/17/2022 Negative  Negative Final    Bilirubin (UA) 03/17/2022 Negative  Negative Final    Occult Blood UA 03/17/2022 2+ (A) Negative Final    Nitrite, UA 03/17/2022 Negative  Negative Final    Leukocytes, UA 03/17/2022 2+ (A) Negative Final    RBC, UA 03/17/2022 >100 (A) 0 - 4 /hpf Final    WBC,  UA 03/17/2022 >100 (A) 0 - 5 /hpf Final    Bacteria 03/17/2022 Many (A) None-Occ /hpf Final    Hyaline Casts, UA 03/17/2022 0  0-1/lpf /lpf Final    Microscopic Comment 03/17/2022 SEE COMMENT   Final    Urine Culture, Routine 03/17/2022  (A)  Final                    Value:KLEBSIELLA PNEUMONIAE  >100,000 cfu/ml     Admission on 03/04/2022, Discharged on 03/05/2022   Component Date Value Ref Range Status    SARS Coronavirus 2 Antigen 03/04/2022 Negative  Negative Final     Acceptable 03/04/2022 Yes   Final    POCT Glucose 03/04/2022 166 (A) 70 - 110 mg/dL Final    POCT Glucose 03/04/2022 223 (A) 70 - 110 mg/dL Final    POCT Glucose 03/05/2022 231 (A) 70 - 110 mg/dL Final    Sodium 03/05/2022 141  136 - 145 mmol/L Final    Potassium 03/05/2022 4.3  3.5 - 5.1 mmol/L Final    Chloride 03/05/2022 110  95 - 110 mmol/L Final    CO2 03/05/2022 22 (A) 23 - 29 mmol/L Final    Glucose 03/05/2022 178 (A) 70 - 110 mg/dL Final    BUN 03/05/2022 13  8 - 23 mg/dL Final    Creatinine 03/05/2022 0.8  0.5 - 1.4 mg/dL Final    Calcium 03/05/2022 8.7  8.7 - 10.5 mg/dL Final    Anion Gap 03/05/2022 9  8 - 16 mmol/L Final    eGFR if African American 03/05/2022 >60.0  >60 mL/min/1.73 m^2 Final    eGFR if non African American 03/05/2022 >60.0  >60 mL/min/1.73 m^2 Final    WBC 03/05/2022 4.96  3.90 - 12.70 K/uL Final    RBC 03/05/2022 3.19 (A) 4.00 - 5.40 M/uL Final    Hemoglobin 03/05/2022 10.4 (A) 12.0 - 16.0 g/dL Final    Hematocrit 03/05/2022 32.3 (A) 37.0 - 48.5 % Final    MCV 03/05/2022 101 (A) 82 - 98 fL Final    MCH 03/05/2022 32.6 (A) 27.0 - 31.0 pg Final    MCHC 03/05/2022 32.2  32.0 - 36.0 g/dL Final    RDW 03/05/2022 13.0  11.5 - 14.5 % Final    Platelets 03/05/2022 66 (A) 150 - 450 K/uL Final    MPV 03/05/2022 11.2  9.2 - 12.9 fL Final    POCT Glucose 03/05/2022 170 (A) 70 - 110 mg/dL Final    POCT Glucose 03/05/2022 138 (A) 70 - 110 mg/dL Final    POCT Glucose 03/04/2022 203 (A)  70 - 110 mg/dL Final   Office Visit on 03/02/2022   Component Date Value Ref Range Status    Sodium 03/02/2022 136  136 - 145 mmol/L Final    Potassium 03/02/2022 4.5  3.5 - 5.1 mmol/L Final    Chloride 03/02/2022 98  95 - 110 mmol/L Final    CO2 03/02/2022 24  23 - 29 mmol/L Final    Glucose 03/02/2022 166 (A) 70 - 110 mg/dL Final    BUN 03/02/2022 14  8 - 23 mg/dL Final    Creatinine 03/02/2022 0.8  0.5 - 1.4 mg/dL Final    Calcium 03/02/2022 9.9  8.7 - 10.5 mg/dL Final    Anion Gap 03/02/2022 14  8 - 16 mmol/L Final    eGFR if African American 03/02/2022 >60.0  >60 mL/min/1.73 m^2 Final    eGFR if non African American 03/02/2022 >60.0  >60 mL/min/1.73 m^2 Final    WBC 03/02/2022 5.49  3.90 - 12.70 K/uL Final    RBC 03/02/2022 3.73 (A) 4.00 - 5.40 M/uL Final    Hemoglobin 03/02/2022 12.3  12.0 - 16.0 g/dL Final    Hematocrit 03/02/2022 37.8  37.0 - 48.5 % Final    MCV 03/02/2022 101 (A) 82 - 98 fL Final    MCH 03/02/2022 33.0 (A) 27.0 - 31.0 pg Final    MCHC 03/02/2022 32.5  32.0 - 36.0 g/dL Final    RDW 03/02/2022 13.0  11.5 - 14.5 % Final    Platelets 03/02/2022 94 (A) 150 - 450 K/uL Final    MPV 03/02/2022 11.7  9.2 - 12.9 fL Final    Immature Granulocytes 03/02/2022 0.4  0.0 - 0.5 % Final    Gran # (ANC) 03/02/2022 3.2  1.8 - 7.7 K/uL Final    Immature Grans (Abs) 03/02/2022 0.02  0.00 - 0.04 K/uL Final    Lymph # 03/02/2022 1.5  1.0 - 4.8 K/uL Final    Mono # 03/02/2022 0.5  0.3 - 1.0 K/uL Final    Eos # 03/02/2022 0.3  0.0 - 0.5 K/uL Final    Baso # 03/02/2022 0.05  0.00 - 0.20 K/uL Final    nRBC 03/02/2022 0  0 /100 WBC Final    Gran % 03/02/2022 57.9  38.0 - 73.0 % Final    Lymph % 03/02/2022 26.4  18.0 - 48.0 % Final    Mono % 03/02/2022 9.3  4.0 - 15.0 % Final    Eosinophil % 03/02/2022 5.1  0.0 - 8.0 % Final    Basophil % 03/02/2022 0.9  0.0 - 1.9 % Final    Differential Method 03/02/2022 Automated   Final       - Independent review of  images:    Diagnoses:          Parkinsonism  Memory loss    - adding aricpet 5 qhs, may get some benefit for GI upset  - keep other meds the some        Horacio Mitchell MD, MPH  Division of Movement and Memory Disorders  [facility]

## 2022-06-07 ENCOUNTER — HOSPITAL ENCOUNTER (INPATIENT)
Facility: HOSPITAL | Age: 77
LOS: 4 days | Discharge: HOME | DRG: 390 | End: 2022-06-11
Attending: EMERGENCY MEDICINE | Admitting: EMERGENCY MEDICINE
Payer: MEDICARE

## 2022-06-07 ENCOUNTER — PATIENT MESSAGE (OUTPATIENT)
Dept: INTERNAL MEDICINE | Facility: CLINIC | Age: 77
End: 2022-06-07
Payer: MEDICARE

## 2022-06-07 DIAGNOSIS — K56.600 PARTIAL SMALL BOWEL OBSTRUCTION: Primary | ICD-10-CM

## 2022-06-07 LAB
ALBUMIN SERPL BCP-MCNC: 4.1 G/DL (ref 3.5–5.2)
ALP SERPL-CCNC: 70 U/L (ref 55–135)
ALT SERPL W/O P-5'-P-CCNC: 32 U/L (ref 10–44)
ANION GAP SERPL CALC-SCNC: 10 MMOL/L (ref 8–16)
AST SERPL-CCNC: 34 U/L (ref 10–40)
BASOPHILS # BLD AUTO: 0.03 K/UL (ref 0–0.2)
BASOPHILS NFR BLD: 0.5 % (ref 0–1.9)
BILIRUB SERPL-MCNC: 1.3 MG/DL (ref 0.1–1)
BUN SERPL-MCNC: 12 MG/DL (ref 8–23)
CALCIUM SERPL-MCNC: 10.2 MG/DL (ref 8.7–10.5)
CHLORIDE SERPL-SCNC: 103 MMOL/L (ref 95–110)
CO2 SERPL-SCNC: 20 MMOL/L (ref 23–29)
CREAT SERPL-MCNC: 0.8 MG/DL (ref 0.5–1.4)
DIFFERENTIAL METHOD: ABNORMAL
EOSINOPHIL # BLD AUTO: 0.2 K/UL (ref 0–0.5)
EOSINOPHIL NFR BLD: 3.5 % (ref 0–8)
ERYTHROCYTE [DISTWIDTH] IN BLOOD BY AUTOMATED COUNT: 13.5 % (ref 11.5–14.5)
EST. GFR  (AFRICAN AMERICAN): >60 ML/MIN/1.73 M^2
EST. GFR  (NON AFRICAN AMERICAN): >60 ML/MIN/1.73 M^2
GLUCOSE SERPL-MCNC: 162 MG/DL (ref 70–110)
HCT VFR BLD AUTO: 39.3 % (ref 37–48.5)
HGB BLD-MCNC: 13.2 G/DL (ref 12–16)
IMM GRANULOCYTES # BLD AUTO: 0.01 K/UL (ref 0–0.04)
IMM GRANULOCYTES NFR BLD AUTO: 0.2 % (ref 0–0.5)
LIPASE SERPL-CCNC: 19 U/L (ref 4–60)
LYMPHOCYTES # BLD AUTO: 1.6 K/UL (ref 1–4.8)
LYMPHOCYTES NFR BLD: 23.5 % (ref 18–48)
MCH RBC QN AUTO: 32.1 PG (ref 27–31)
MCHC RBC AUTO-ENTMCNC: 33.6 G/DL (ref 32–36)
MCV RBC AUTO: 96 FL (ref 82–98)
MONOCYTES # BLD AUTO: 0.6 K/UL (ref 0.3–1)
MONOCYTES NFR BLD: 9.4 % (ref 4–15)
NEUTROPHILS # BLD AUTO: 4.2 K/UL (ref 1.8–7.7)
NEUTROPHILS NFR BLD: 62.9 % (ref 38–73)
NRBC BLD-RTO: 0 /100 WBC
PLATELET # BLD AUTO: 89 K/UL (ref 150–450)
PMV BLD AUTO: 11.1 FL (ref 9.2–12.9)
POTASSIUM SERPL-SCNC: 4.5 MMOL/L (ref 3.5–5.1)
PROT SERPL-MCNC: 7.5 G/DL (ref 6–8.4)
RBC # BLD AUTO: 4.11 M/UL (ref 4–5.4)
SODIUM SERPL-SCNC: 133 MMOL/L (ref 136–145)
WBC # BLD AUTO: 6.59 K/UL (ref 3.9–12.7)

## 2022-06-07 PROCEDURE — 96361 HYDRATE IV INFUSION ADD-ON: CPT

## 2022-06-07 PROCEDURE — 99285 EMERGENCY DEPT VISIT HI MDM: CPT | Mod: 25

## 2022-06-07 PROCEDURE — 96374 THER/PROPH/DIAG INJ IV PUSH: CPT

## 2022-06-07 PROCEDURE — 63600175 PHARM REV CODE 636 W HCPCS: Performed by: EMERGENCY MEDICINE

## 2022-06-07 PROCEDURE — 25000003 PHARM REV CODE 250: Performed by: EMERGENCY MEDICINE

## 2022-06-07 PROCEDURE — 63600175 PHARM REV CODE 636 W HCPCS: Performed by: FAMILY MEDICINE

## 2022-06-07 PROCEDURE — 99285 EMERGENCY DEPT VISIT HI MDM: CPT | Mod: ,,, | Performed by: EMERGENCY MEDICINE

## 2022-06-07 PROCEDURE — 96375 TX/PRO/DX INJ NEW DRUG ADDON: CPT

## 2022-06-07 PROCEDURE — 25000003 PHARM REV CODE 250: Performed by: FAMILY MEDICINE

## 2022-06-07 PROCEDURE — 99285 PR EMERGENCY DEPT VISIT,LEVEL V: ICD-10-PCS | Mod: ,,, | Performed by: EMERGENCY MEDICINE

## 2022-06-07 PROCEDURE — 83690 ASSAY OF LIPASE: CPT | Performed by: EMERGENCY MEDICINE

## 2022-06-07 PROCEDURE — 85025 COMPLETE CBC W/AUTO DIFF WBC: CPT | Performed by: EMERGENCY MEDICINE

## 2022-06-07 PROCEDURE — 20600001 HC STEP DOWN PRIVATE ROOM

## 2022-06-07 PROCEDURE — 80053 COMPREHEN METABOLIC PANEL: CPT | Performed by: EMERGENCY MEDICINE

## 2022-06-07 PROCEDURE — 94761 N-INVAS EAR/PLS OXIMETRY MLT: CPT

## 2022-06-07 RX ORDER — ONDANSETRON 2 MG/ML
4 INJECTION INTRAMUSCULAR; INTRAVENOUS
Status: COMPLETED | OUTPATIENT
Start: 2022-06-07 | End: 2022-06-07

## 2022-06-07 RX ORDER — POLYETHYLENE GLYCOL 3350 17 G/17G
17 POWDER, FOR SOLUTION ORAL DAILY
Status: DISCONTINUED | OUTPATIENT
Start: 2022-06-08 | End: 2022-06-07

## 2022-06-07 RX ORDER — DULOXETIN HYDROCHLORIDE 30 MG/1
30 CAPSULE, DELAYED RELEASE ORAL 2 TIMES DAILY
Status: DISCONTINUED | OUTPATIENT
Start: 2022-06-07 | End: 2022-06-11 | Stop reason: HOSPADM

## 2022-06-07 RX ORDER — GLUCAGON 1 MG
1 KIT INJECTION
Status: DISCONTINUED | OUTPATIENT
Start: 2022-06-07 | End: 2022-06-11 | Stop reason: HOSPADM

## 2022-06-07 RX ORDER — ACETAMINOPHEN 325 MG/1
650 TABLET ORAL EVERY 8 HOURS PRN
Status: DISCONTINUED | OUTPATIENT
Start: 2022-06-07 | End: 2022-06-09

## 2022-06-07 RX ORDER — SODIUM CHLORIDE, SODIUM LACTATE, POTASSIUM CHLORIDE, CALCIUM CHLORIDE 600; 310; 30; 20 MG/100ML; MG/100ML; MG/100ML; MG/100ML
INJECTION, SOLUTION INTRAVENOUS CONTINUOUS
Status: DISCONTINUED | OUTPATIENT
Start: 2022-06-07 | End: 2022-06-11 | Stop reason: HOSPADM

## 2022-06-07 RX ORDER — PROCHLORPERAZINE EDISYLATE 5 MG/ML
5 INJECTION INTRAMUSCULAR; INTRAVENOUS EVERY 6 HOURS PRN
Status: DISCONTINUED | OUTPATIENT
Start: 2022-06-07 | End: 2022-06-11 | Stop reason: HOSPADM

## 2022-06-07 RX ORDER — MONTELUKAST SODIUM 10 MG/1
10 TABLET ORAL DAILY
Status: DISCONTINUED | OUTPATIENT
Start: 2022-06-08 | End: 2022-06-11 | Stop reason: HOSPADM

## 2022-06-07 RX ORDER — LEVOTHYROXINE SODIUM 75 UG/1
75 TABLET ORAL
Status: DISCONTINUED | OUTPATIENT
Start: 2022-06-08 | End: 2022-06-11 | Stop reason: HOSPADM

## 2022-06-07 RX ORDER — SODIUM CHLORIDE 0.9 % (FLUSH) 0.9 %
10 SYRINGE (ML) INJECTION
Status: DISCONTINUED | OUTPATIENT
Start: 2022-06-07 | End: 2022-06-11 | Stop reason: HOSPADM

## 2022-06-07 RX ORDER — DONEPEZIL HYDROCHLORIDE 5 MG/1
5 TABLET, FILM COATED ORAL NIGHTLY
Status: DISCONTINUED | OUTPATIENT
Start: 2022-06-07 | End: 2022-06-11 | Stop reason: HOSPADM

## 2022-06-07 RX ORDER — INSULIN ASPART 100 [IU]/ML
0-5 INJECTION, SOLUTION INTRAVENOUS; SUBCUTANEOUS EVERY 6 HOURS PRN
Status: DISCONTINUED | OUTPATIENT
Start: 2022-06-07 | End: 2022-06-11 | Stop reason: HOSPADM

## 2022-06-07 RX ORDER — TALC
6 POWDER (GRAM) TOPICAL NIGHTLY PRN
Status: DISCONTINUED | OUTPATIENT
Start: 2022-06-07 | End: 2022-06-11 | Stop reason: HOSPADM

## 2022-06-07 RX ORDER — ATORVASTATIN CALCIUM 20 MG/1
40 TABLET, FILM COATED ORAL DAILY
Status: DISCONTINUED | OUTPATIENT
Start: 2022-06-08 | End: 2022-06-11 | Stop reason: HOSPADM

## 2022-06-07 RX ORDER — PANTOPRAZOLE SODIUM 20 MG/1
20 TABLET, DELAYED RELEASE ORAL DAILY
Status: DISCONTINUED | OUTPATIENT
Start: 2022-06-08 | End: 2022-06-11 | Stop reason: HOSPADM

## 2022-06-07 RX ORDER — ONDANSETRON 2 MG/ML
4 INJECTION INTRAMUSCULAR; INTRAVENOUS EVERY 6 HOURS PRN
Status: DISCONTINUED | OUTPATIENT
Start: 2022-06-07 | End: 2022-06-11 | Stop reason: HOSPADM

## 2022-06-07 RX ORDER — MORPHINE SULFATE 2 MG/ML
6 INJECTION, SOLUTION INTRAMUSCULAR; INTRAVENOUS
Status: COMPLETED | OUTPATIENT
Start: 2022-06-07 | End: 2022-06-07

## 2022-06-07 RX ORDER — ENOXAPARIN SODIUM 100 MG/ML
40 INJECTION SUBCUTANEOUS EVERY 24 HOURS
Status: DISCONTINUED | OUTPATIENT
Start: 2022-06-07 | End: 2022-06-11 | Stop reason: HOSPADM

## 2022-06-07 RX ADMIN — DULOXETINE 30 MG: 30 CAPSULE, DELAYED RELEASE ORAL at 10:06

## 2022-06-07 RX ADMIN — ENOXAPARIN SODIUM 40 MG: 100 INJECTION SUBCUTANEOUS at 10:06

## 2022-06-07 RX ADMIN — DONEPEZIL HYDROCHLORIDE 5 MG: 5 TABLET, FILM COATED ORAL at 10:06

## 2022-06-07 RX ADMIN — ONDANSETRON 4 MG: 2 INJECTION INTRAMUSCULAR; INTRAVENOUS at 02:06

## 2022-06-07 RX ADMIN — SODIUM CHLORIDE 1000 ML: 0.9 INJECTION, SOLUTION INTRAVENOUS at 02:06

## 2022-06-07 RX ADMIN — SODIUM CHLORIDE, SODIUM LACTATE, POTASSIUM CHLORIDE, AND CALCIUM CHLORIDE: .6; .31; .03; .02 INJECTION, SOLUTION INTRAVENOUS at 10:06

## 2022-06-07 RX ADMIN — MORPHINE SULFATE 6 MG: 2 INJECTION, SOLUTION INTRAMUSCULAR; INTRAVENOUS at 02:06

## 2022-06-07 NOTE — CONSULTS
[facility] - Emergency Dept  General Surgery  Consult Note    Patient Name: Anayeli Judd  MRN: [id number]  Code Status: Prior  Admission Date: 6/7/2022  Hospital Length of Stay: 0 days  Attending Physician: Micheal Smith MD  Primary Care Provider: Darci Guthrie MD    Patient information was obtained from patient, past medical records and ER records.     Inpatient consult to General surgery  Consult performed by: Bella Blackburn MD  Consult ordered by: Micheal Smith MD        Subjective:     Principal Problem: <principal problem not specified>    History of Present Illness: Anayeli Judd is a 77 y.o. 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 She underwent ex-lap and attempted TIFFANY 3 months ago but the procedure was aborted due to dense adhesions. She was admitted most recently on 5/28 for concerns related to recurrent pSBO. At that time she was admitted to obs and had quick return of bowel function. She presents today with 1 day history of abdominal pain, nausea and increase bowel movements. Reports abdominal cramping started this morning. Pain has improved significantly since she has been in the ED. Distention has resolved. She had one episode of nausea this am, no reported emesis. She has had an increase in loose, non-bloody bowel movements today. She normally has 3-4 bowel movements per day, today she has had 7-8, per report from her care taker.     In ED she is HDS. Lab works relatively unremarkable. CT scan similar to previous with distended loops of small bowel. Noted to have enlarge, distended bladder on CT.       No current facility-administered medications on file prior to encounter.     Current Outpatient Medications on File Prior to Encounter   Medication Sig    acetaminophen (TYLENOL) 650 MG TbSR Take 650 mg by mouth once as needed (pain).    ascorbic acid, vitamin C, (VITAMIN C) 1000 MG tablet Take 1,000 mg by mouth once  daily.    atorvastatin (LIPITOR) 40 MG tablet TAKE 1 TABLET BY MOUTH  DAILY    azelastine (ASTELIN) 137 mcg (0.1 %) nasal spray 2 sprays (274 mcg total) by Nasal route 2 (two) times daily as needed for Rhinitis.    biotin 10,000 mcg TbDL Take 1 tablet by mouth once daily.     calcium-vitamin D3 (OS-KASSANDRA 500 + D3) 500 mg-5 mcg (200 unit) per tablet Take 1,200 tablets by mouth once daily.    cefUROXime (CEFTIN) 500 MG tablet Take 1 tablet (500 mg total) by mouth every 12 (twelve) hours.    co-enzyme Q-10 30 mg capsule Take 30 mg by mouth 3 (three) times daily.    conjugated estrogens (PREMARIN) vaginal cream INSERT 1/2 GRAM VAGINALLY  TWICE WEEKLY    cranberry extract 200 mg Cap Take 1 capsule by mouth once daily.    D-MANNOSE ORAL Take 1 tablet by mouth once daily.     diclofenac sodium (VOLTAREN) 1 % Gel Apply 2 g topically 4 (four) times daily as needed (Back pain).    donepeziL (ARICEPT) 5 MG tablet Take 1 tablet (5 mg total) by mouth every evening.    DULoxetine (CYMBALTA) 30 MG capsule Take 1 capsule (30 mg total) by mouth 2 (two) times daily.    famotidine (PEPCID) 20 MG tablet Take 1 tablet (20 mg total) by mouth 2 (two) times daily as needed for Heartburn.    flash glucose scanning reader (FREESTYLE EFREN 14 DAY READER) Misc Check blood glucose level 3 times daily.    flash glucose sensor (FREESTYLE EFREN 14 DAY SENSOR) Kit 1 application by Misc.(Non-Drug; Combo Route) route every 14 (fourteen) days. Disp 30 or 90 day refill    fluticasone propionate (FLONASE) 50 mcg/actuation nasal spray 1 spray by Each Nostril route daily as needed for Rhinitis.    FREESTYLE LITE STRIPS Strp TEST DAILY AS DIRECTED    gabapentin (NEURONTIN) 600 MG tablet Take 1 tablet (600 mg total) by mouth 2 (two) times daily.    hydrocortisone 2.5 % cream Apply topically 2 (two) times daily as needed.    hyoscyamine (ANASPAZ,LEVSIN) 0.125 mg Tab TAKE 1 TABLET(125 MCG) BY MOUTH EVERY 8 HOURS AS NEEDED FOR URGENCY     levothyroxine (SYNTHROID) 75 MCG tablet Take 1 tablet (75 mcg total) by mouth before breakfast.    LIDOcaine (LIDODERM) 5 % Place 1 patch onto the skin once daily. Remove & Discard patch within 12 hours or as directed by MD    magnesium 250 mg Tab Take 1,000 mg by mouth once daily.    metFORMIN (GLUCOPHAGE) 500 MG tablet TAKE 1 TABLET(500 MG) BY MOUTH DAILY WITH BREAKFAST    montelukast (SINGULAIR) 10 mg tablet TAKE 1 TABLET BY MOUTH  DAILY    ondansetron (ZOFRAN-ODT) 8 MG TbDL Take 1 tablet (8 mg total) by mouth every 8 (eight) hours as needed (nausea).    pantoprazole (PROTONIX) 20 MG tablet Take 1 tablet (20 mg total) by mouth once daily.    polyethylene glycol (GLYCOLAX) 17 gram/dose powder Take 17 g by mouth before dinner.    vitamin D 1000 units Tab Take 1,000 Units by mouth once daily. D3       Review of patient's allergies indicates:   Allergen Reactions    Codeine Itching and Nausea And Vomiting    Dilaudid [hydromorphone (bulk)] Other (See Comments)     Oversedating, head burning. Pt prefers to avoid.       Percocet [oxycodone-acetaminophen] Itching    Sulfa (sulfonamide antibiotics) Itching and Nausea And Vomiting           Latex, natural rubber Rash       Past Medical History:   Diagnosis Date    Abdominal pain     Allergic rhinitis     Arthritis     Blood platelet disorder     Blood platelet disorder     Blood transfusion     during delivery and     Bowel obstruction     Cervical radiculopathy     followed by dr cloud    Colon cancer     transverse colon; resected; Stage IIA (pT3 pN0 MX)    Diabetes mellitus     Diarrhea     Falls 2020    Family history of breast cancer     Family history of colon cancer     Fatty liver     GERD (gastroesophageal reflux disease)     History of shingles     Hyperlipidemia     Hypothyroidism     Irritable bowel syndrome     Microscopic colitis     treated     Myelodysplastic syndrome     Raynaud phenomenon      Raynaud's disease     Type 2 diabetes mellitus      Past Surgical History:   Procedure Laterality Date    ADENOIDECTOMY      APPENDECTOMY      BACK SURGERY      CARPAL TUNNEL RELEASE      bilateral      SECTION      CHOLECYSTECTOMY  1965    COLECTOMY  2011    Transverse colon resection by Dr. Aguirre    COLONOSCOPY N/A 2017    Procedure: COLONOSCOPY;  Surgeon: Manjit Alvarez MD;  Location: [facility] (4TH FLR);  Service: Endoscopy;  Laterality: N/A;    COLONOSCOPY N/A 2019    Procedure: COLONOSCOPY;  Surgeon: Ramiro Jefferson MD;  Location: [facility] (4TH FLR);  Service: Endoscopy;  Laterality: N/A;  PM prep    COLONOSCOPY N/A 2022    Procedure: COLONOSCOPY;  Surgeon: Ramiro Jefferson MD;  Location: [facility] (2ND FLR);  Service: Endoscopy;  Laterality: N/A;  EGD and colonoscopy in 6-8 weeks from now     states has constipation. -extended Golytely prep    CYSTOSCOPY N/A 2021    Procedure: CYSTOSCOPY;  Surgeon: Viridiana Valenzuela MD;  Location: [facility] 1ST FLR;  Service: Urology;  Laterality: N/A;    ENDOSCOPIC ULTRASOUND OF UPPER GASTROINTESTINAL TRACT N/A 2018    Procedure: ULTRASOUND, UPPER GI TRACT, ENDOSCOPIC;  Surgeon: Jose Hess MD;  Location: [facility];  Service: Endoscopy;  Laterality: N/A;    ENDOSCOPIC ULTRASOUND OF UPPER GASTROINTESTINAL TRACT N/A 2019    Procedure: ULTRASOUND, UPPER GI TRACT, ENDOSCOPIC;  Surgeon: Jose Hess MD;  Location: [facility] (2ND FLR);  Service: Endoscopy;  Laterality: N/A;    ESOPHAGOGASTRODUODENOSCOPY N/A 2018    Procedure: EGD (ESOPHAGOGASTRODUODENOSCOPY);  Surgeon: Angelo Reynolds MD;  Location: [facility] (2ND FLR);  Service: Endoscopy;  Laterality: N/A;    ESOPHAGOGASTRODUODENOSCOPY N/A 2019    Procedure: EGD (ESOPHAGOGASTRODUODENOSCOPY);  Surgeon: Ramiro Jefferson MD;  Location: [facility] (4TH FLR);  Service: Endoscopy;  Laterality: N/A;    ESOPHAGOGASTRODUODENOSCOPY N/A 2022     Procedure: EGD (ESOPHAGOGASTRODUODENOSCOPY);  Surgeon: Ramiro Jefferson MD;  Location: [facility] (2ND FLR);  Service: Endoscopy;  Laterality: N/A;  fully vaccinated-GT    EYE SURGERY      Cataract Removal    FLUOROSCOPIC URODYNAMIC STUDY N/A 11/09/2021    Procedure: URODYNAMIC STUDY, FLUOROSCOPIC;  Surgeon: Viridiana Valenzuela MD;  Location: [facility] OR 1ST FLR;  Service: Urology;  Laterality: N/A;  90 minutes     HYSTERECTOMY      JOINT REPLACEMENT      posterolateral fusion with autograft bone and Eun mineralized bone matrix  02/01/2013    at [facility] for lumbar spine stenosis    SMALL INTESTINE SURGERY      SPINE SURGERY      TOE SURGERY      TONSILLECTOMY      TRIGGER FINGER RELEASE       Family History       Problem Relation (Age of Onset)    Alcohol abuse Brother, Brother, Brother    Arthritis Daughter, Brother, Brother    Asthma Daughter, Daughter    Bladder Cancer Mother    Breast cancer Sister (79)    Cataracts Mother    Colon cancer Father, Brother (70)    Depression Daughter, Daughter, Daughter    Glaucoma Mother    Heart disease Father (50), Mother    Hyperlipidemia Mother    Hypertension Daughter    Kidney disease Mother    Parkinsonism Brother    Stroke Daughter (40), Daughter          Tobacco Use    Smoking status: Never Smoker    Smokeless tobacco: Never Used   Substance and Sexual Activity    Alcohol use: Not Currently     Alcohol/week: 3.0 standard drinks     Types: 3 Glasses of wine per week     Comment: socially, hardly ever    Drug use: Never    Sexual activity: Not Currently     Review of Systems   Constitutional:  Negative for activity change, appetite change, chills, fatigue and fever.   Respiratory:  Negative for cough and shortness of breath.    Cardiovascular:  Negative for chest pain and palpitations.   Gastrointestinal:  Positive for abdominal distention, abdominal pain, diarrhea and nausea. Negative for blood in stool, constipation and vomiting.   Genitourinary:  Positive  for difficulty urinating.   Musculoskeletal:  Negative for arthralgias and back pain.   Skin:  Negative for color change and wound.   Neurological:  Negative for dizziness and weakness.   Hematological:  Negative for adenopathy. Does not bruise/bleed easily.   Objective:     Vital Signs (Most Recent):  Temp: 98.6 °F (37 °C) (06/07/22 1724)  Pulse: 89 (06/07/22 1724)  Resp: 18 (06/07/22 1724)  BP: 118/76 (06/07/22 1724)  SpO2: 99 % (06/07/22 1724) Vital Signs (24h Range):  Temp:  [97.7 °F (36.5 °C)-98.6 °F (37 °C)] 98.6 °F (37 °C)  Pulse:  [63-89] 89  Resp:  [16-18] 18  SpO2:  [99 %-100 %] 99 %  BP: (107-118)/(57-76) 118/76     Weight: 56.7 kg (125 lb)  Body mass index is 22.86 kg/m².    Physical Exam  Vitals and nursing note reviewed.   Constitutional:       General: She is not in acute distress.     Appearance: Normal appearance. She is not ill-appearing, toxic-appearing or diaphoretic.   HENT:      Head: Normocephalic.   Cardiovascular:      Rate and Rhythm: Normal rate and regular rhythm.   Abdominal:      General: Abdomen is flat. There is no distension.      Palpations: Abdomen is soft. There is no mass.      Tenderness: There is abdominal tenderness (minimal right sided).      Hernia: No hernia is present.      Comments: Previous laparotomy incision well healed    Musculoskeletal:         General: Normal range of motion.   Skin:     General: Skin is warm and dry.      Capillary Refill: Capillary refill takes less than 2 seconds.   Neurological:      General: No focal deficit present.      Mental Status: She is alert.       Significant Labs:  I have reviewed all pertinent lab results within the past 24 hours.  CBC:   Recent Labs   Lab 06/07/22  1439   WBC 6.59   RBC 4.11   HGB 13.2   HCT 39.3   PLT 89*   MCV 96   MCH 32.1*   MCHC 33.6     BMP:   Recent Labs   Lab 06/07/22  1439   *   *   K 4.5      CO2 20*   BUN 12   CREATININE 0.8   CALCIUM 10.2     CMP:   Recent Labs   Lab 06/07/22  4134    *   CALCIUM 10.2   ALBUMIN 4.1   PROT 7.5   *   K 4.5   CO2 20*      BUN 12   CREATININE 0.8   ALKPHOS 70   ALT 32   AST 34   BILITOT 1.3*     LFTs:   Recent Labs   Lab 06/07/22  1439   ALT 32   AST 34   ALKPHOS 70   BILITOT 1.3*   PROT 7.5   ALBUMIN 4.1     Coagulation: No results for input(s): LABPROT, INR, APTT in the last 168 hours.    Significant Diagnostics:  I have reviewed all pertinent imaging results/findings within the past 24 hours.      Assessment/Plan:     Abdominal discomfort   77 y.o. 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 Most recently underwent ex-lap and attempted TIFFANY 3  Months ago and recent observation for psbo which resolved <24hrs. Returns today with abdominal pain and increase diarrhea. CT Scan relatively stable with persistent small bowel dilation. Lab work unremarkable.      -No clinical evidence of pSBO with persistent bowel function, and stable, if not slightly improved CT scan from previous admission  - No acute surgical intervention warranted   -Would recommend clear liquids, PRN pain management trying to minimize narcotics  - Would recommend consult to internal medicine and possible inpatient GI consult for optimization of bowel regimen, GI motility, and dietary modifications that may assist in her symptomatology.          VTE Risk Mitigation (From admission, onward)    None          Thank you for your consult.    Bella Blackburn MD  General Surgery  [facility] - [facility] Dept

## 2022-06-07 NOTE — ED PROVIDER NOTES
Encounter Date: 2022       History     Chief Complaint   Patient presents with    Abdominal Pain     Hx of scar tissue in colon that causes chronic pain, reports diarrhea. Denies n/v     77-year-old female presents with abdominal pain.  She has a history of colon cancer and recurrent small-bowel obstructions.  She had an attempt of lysis of adhesions recently but this was aborted due to too much scar tissue.   She started with central periumbilical abdominal pain 3 hours prior to arrival.  It comes in waves and it is intense.  Currently 6/10.  There has been no vomiting but she has nausea.  She had a loose bowel movement this morning.  She is concerned about another bowel obstruction.  Denies fever chest pain shortness of breath.        Review of patient's allergies indicates:   Allergen Reactions    Codeine Itching and Nausea And Vomiting    Dilaudid [hydromorphone (bulk)] Other (See Comments)     Oversedating, head burning. Pt prefers to avoid.       Percocet [oxycodone-acetaminophen] Itching    Sulfa (sulfonamide antibiotics) Itching and Nausea And Vomiting           Latex, natural rubber Rash     Past Medical History:   Diagnosis Date    Abdominal pain     Allergic rhinitis     Arthritis     Blood platelet disorder     Blood platelet disorder     Blood transfusion     during delivery and     Bowel obstruction     Cervical radiculopathy     followed by dr cloud    Colon cancer     transverse colon; resected; Stage IIA (pT3 pN0 MX)    Diabetes mellitus     Diarrhea     Falls 2020    Family history of breast cancer     Family history of colon cancer     Fatty liver     GERD (gastroesophageal reflux disease)     History of shingles     Hyperlipidemia     Hypothyroidism     Irritable bowel syndrome     Microscopic colitis     treated     Myelodysplastic syndrome     Raynaud phenomenon     Raynaud's disease     Type 2 diabetes mellitus      Past Surgical History:    Procedure Laterality Date    ADENOIDECTOMY      APPENDECTOMY      BACK SURGERY      CARPAL TUNNEL RELEASE      bilateral      SECTION      CHOLECYSTECTOMY  1965    COLECTOMY  2011    Transverse colon resection by Dr. Aguirre    COLONOSCOPY N/A 2017    Procedure: COLONOSCOPY;  Surgeon: Manjit Alvarez MD;  Location: [facility] (4TH FLR);  Service: Endoscopy;  Laterality: N/A;    COLONOSCOPY N/A 2019    Procedure: COLONOSCOPY;  Surgeon: Ramiro Jefferson MD;  Location: [facility] (4TH FLR);  Service: Endoscopy;  Laterality: N/A;  PM prep    COLONOSCOPY N/A 2022    Procedure: COLONOSCOPY;  Surgeon: Ramiro Jefferson MD;  Location: [facility] (2ND FLR);  Service: Endoscopy;  Laterality: N/A;  EGD and colonoscopy in 6-8 weeks from now     states has constipation. -extended Golytely prep    CYSTOSCOPY N/A 2021    Procedure: CYSTOSCOPY;  Surgeon: Viridiana Valenzuela MD;  Location: [facility] 1ST FLR;  Service: Urology;  Laterality: N/A;    ENDOSCOPIC ULTRASOUND OF UPPER GASTROINTESTINAL TRACT N/A 2018    Procedure: ULTRASOUND, UPPER GI TRACT, ENDOSCOPIC;  Surgeon: Jose Hess MD;  Location: [facility];  Service: Endoscopy;  Laterality: N/A;    ENDOSCOPIC ULTRASOUND OF UPPER GASTROINTESTINAL TRACT N/A 2019    Procedure: ULTRASOUND, UPPER GI TRACT, ENDOSCOPIC;  Surgeon: Jose Hess MD;  Location: [facility] (2ND FLR);  Service: Endoscopy;  Laterality: N/A;    ESOPHAGOGASTRODUODENOSCOPY N/A 2018    Procedure: EGD (ESOPHAGOGASTRODUODENOSCOPY);  Surgeon: Angelo Reynolds MD;  Location: [facility] (2ND FLR);  Service: Endoscopy;  Laterality: N/A;    ESOPHAGOGASTRODUODENOSCOPY N/A 2019    Procedure: EGD (ESOPHAGOGASTRODUODENOSCOPY);  Surgeon: Ramiro Jefferson MD;  Location: [facility] (4TH FLR);  Service: Endoscopy;  Laterality: N/A;    ESOPHAGOGASTRODUODENOSCOPY N/A 2022    Procedure: EGD (ESOPHAGOGASTRODUODENOSCOPY);  Surgeon: Ramiro Jefferson,  MD;  Location: [facility] ENDO (2ND FLR);  Service: Endoscopy;  Laterality: N/A;  fully vaccinated-GT    EYE SURGERY      Cataract Removal    FLUOROSCOPIC URODYNAMIC STUDY N/A 11/09/2021    Procedure: URODYNAMIC STUDY, FLUOROSCOPIC;  Surgeon: Viridiana Valenzuela MD;  Location: [facility] OR 1ST FLR;  Service: Urology;  Laterality: N/A;  90 minutes     HYSTERECTOMY      JOINT REPLACEMENT      posterolateral fusion with autograft bone and Eun mineralized bone matrix  02/01/2013    at [facility] for lumbar spine stenosis    SMALL INTESTINE SURGERY      SPINE SURGERY      TOE SURGERY      TONSILLECTOMY      TRIGGER FINGER RELEASE       Family History   Problem Relation Age of Onset    Heart disease Father 50        Mi age 50    Colon cancer Father     Bladder Cancer Mother         non smoker    Cataracts Mother     Glaucoma Mother     Heart disease Mother     Hyperlipidemia Mother     Kidney disease Mother     Breast cancer Sister 79    Arthritis Daughter     Asthma Daughter     Depression Daughter     Hypertension Daughter     Stroke Daughter 40    Arthritis Brother     Colon cancer Brother 70    Alcohol abuse Brother     Parkinsonism Brother     Alcohol abuse Brother     Depression Daughter     Stroke Daughter     Alcohol abuse Brother     Arthritis Brother     Asthma Daughter     Depression Daughter     Celiac disease Neg Hx     Cirrhosis Neg Hx     Colon polyps Neg Hx     Crohn's disease Neg Hx     Cystic fibrosis Neg Hx     Esophageal cancer Neg Hx     Hemochromatosis Neg Hx     Inflammatory bowel disease Neg Hx     Irritable bowel syndrome Neg Hx     Liver cancer Neg Hx     Liver disease Neg Hx     Rectal cancer Neg Hx     Stomach cancer Neg Hx     Ulcerative colitis Neg Hx     Eliazar's disease Neg Hx     Amblyopia Neg Hx     Blindness Neg Hx     Macular degeneration Neg Hx     Retinal detachment Neg Hx     Strabismus Neg Hx     Melanoma Neg Hx      Social History      Tobacco Use    Smoking status: Never Smoker    Smokeless tobacco: Never Used   Substance Use Topics    Alcohol use: Not Currently     Alcohol/week: 3.0 standard drinks     Types: 3 Glasses of wine per week     Comment: socially, hardly ever    Drug use: Never     Review of Systems   Constitutional: Negative for fever.   HENT: Negative for sore throat.    Respiratory: Negative for shortness of breath.    Cardiovascular: Negative for chest pain.   Gastrointestinal: Positive for abdominal pain and nausea.   Genitourinary: Negative for dysuria.   Musculoskeletal: Negative for back pain.   Skin: Negative for rash.   Neurological: Negative for weakness.   Hematological: Does not bruise/bleed easily.       Physical Exam     Initial Vitals [06/07/22 1355]   BP Pulse Resp Temp SpO2   (!) 107/57 63 16 97.7 °F (36.5 °C) 100 %      MAP       --         Physical Exam    Constitutional: She appears well-developed and well-nourished. She is not diaphoretic. No distress.   HENT:   Head: Normocephalic and atraumatic.   Eyes: Conjunctivae are normal.   Neck: Neck supple. No tracheal deviation present. No JVD present.   Normal range of motion.  Cardiovascular: Normal rate, regular rhythm, normal heart sounds and intact distal pulses.   Pulmonary/Chest: Breath sounds normal. No respiratory distress. She has no wheezes. She has no rhonchi. She has no rales.   Abdominal: Abdomen is soft. She exhibits distension. There is abdominal tenderness.   Hypoactive bowel sounds There is no rebound.   Musculoskeletal:         General: No edema.      Cervical back: Normal range of motion and neck supple.     Neurological: She is alert. She has normal strength. No sensory deficit. GCS score is 15. GCS eye subscore is 4. GCS verbal subscore is 5. GCS motor subscore is 6.   Skin: Skin is warm. No rash noted.   Psychiatric: She has a normal mood and affect.         ED Course   Procedures  Labs Reviewed   CBC W/ AUTO DIFFERENTIAL - Abnormal;  Notable for the following components:       Result Value    MCH 32.1 (*)     Platelets 89 (*)     All other components within normal limits   COMPREHENSIVE METABOLIC PANEL - Abnormal; Notable for the following components:    Sodium 133 (*)     CO2 20 (*)     Glucose 162 (*)     Total Bilirubin 1.3 (*)     All other components within normal limits   LIPASE          Imaging Results           CT Abdomen Pelvis  Without Contrast (Final result)  Result time 06/07/22 16:01:38    Final result by Guerrero Busch Jr., MD (06/07/22 16:01:38)                 Impression:      Distended loops of small bowel in the right hemiabdomen with decompressed loops and soft tissue thickening near the midline.  Overall configuration the distended bowel loops appears similar to prior with slightly lesser degree of distension.  Findings may reflect recurrent partial small bowel obstruction.  Possible small bowel transition point near the probable adhesive change in the right mid abdomen anteriorly.    Additional stable findings above.    This report was flagged in Epic as abnormal.    Electronically signed by resident: Timoteo Conroy  Date:    06/07/2022  Time:    15:22    Electronically signed by: Guerrero Busch MD  Date:    06/07/2022  Time:    16:01             Narrative:    EXAMINATION:  CT ABDOMEN PELVIS WITHOUT CONTRAST    CLINICAL HISTORY:  Bowel obstruction suspected;    TECHNIQUE:  Routine axial CT images of the abdomen and pelvis were obtained without contrast.  .  Coronal and Sagittal reformatted images were also obtained.    COMPARISON:  CT abdomen pelvis 05/20/2022; CT abdomen pelvis 02/10/2022    FINDINGS:  Heart: Partially imaged demonstrating no cardiomegaly or significant pericardial fluid.    Lungs: Visualized lung bases demonstrate peripheral reticulation the lower lobes similar to prior.  No new focal consolidation or pleural fluid.    Liver: Normal in size.  No focal lesion noting noncontrast technique.    Gallbladder:  Surgically absent.    Bile ducts: Unchanged mild prominence of the extrahepatic common duct.  No intrahepatic ductal dilatation.    Pancreas: No mass or peripancreatic fat stranding.    Spleen: Borderline splenomegaly.  Small splenule.    Adrenals: Normal.    GI Tract/ Mesentery/abdominal wall:    Distal esophagus and stomach are unremarkable.    Duodenal circumferential thickening versus peristalsis likely new from prior.    Postoperative changes of multiple partial bowel resections including reported transverse partial colectomy in this patient with history of cancer.  There are distended small bowel loops most pronounced in the right hemiabdomen with air-fluid levels measuring up to the 4.0 cm with slightly less distended appearance compared to 05/28/2022.  Continued several decompressed bowel loops closely applied to the anterior abdominal wall with surrounding soft tissue thickening anteriorly suggestive of adhesions.  Possible transition point in the right mid abdomen anteriorly (601:99) similar to prior.  Overall the configuration of bowel loops is similar to prior exam.  No pneumatosis or portal venous gas.    Generalized mesenteric stranding.  No significant abdominopelvic free fluid.    Kidneys/ Ureters: Normal in location.  Suspected right extrarenal pelvis.  No hydronephrosis or nephrolithiasis. No ureteral dilatation.   Stable right medial hypodensity measuring 1.1 cm compatible with cyst.    Bladder: No evidence of wall thickening.    Reproductive organs: Uterus is surgically absent.    Lymph nodes: Stable mildly prominent olga hepatis and periaortic lymph nodes in the upper abdomen.    Peritoneal space: No free air.    Vasculature: Persistent soft tissue haziness and thickening about the celiac axis, SMA, and aortic bifurcation.  Scattered calcific atherosclerosis of the aorta and branch vessels.  No aneurysm.    Bones: No acute fracture. Advanced degenerative changes of the spine similar to prior.   Grade 2 anterolisthesis L5 on S1 with osseous fusion unchanged.  Grade 1 anterolisthesis L4 on L5 unchanged.  Bilateral L5 pars defects.                                 Medications   sodium chloride 0.9% bolus 1,000 mL (0 mLs Intravenous Stopped 6/7/22 1724)   morphine injection 6 mg (6 mg Intravenous Given 6/7/22 1455)   ondansetron injection 4 mg (4 mg Intravenous Given 6/7/22 1452)     Medical Decision Making:   Initial Assessment:   Patient with recurrent small-bowel obstructions presents with abdominal pain and nausea.  She does have some tenderness on her exam but no peritoneal signs.  Will check labs and CT  Clinical Tests:   Lab Tests: Ordered and Reviewed  Radiological Study: Ordered and Reviewed  ED Management:  CT shows a partial small-bowel obstruction.  Labs are reassuring.  I re-evaluated patient at 4:00 p.m..  She states her pain is much improved and only has mild discomfort.  Denies any nausea or episodes of vomiting.  Will discuss case with General surgery.    5pm - Surg called back.  Recommend I contact bariatric surgery.  Resident for bariatric surgery paged.    5:47 PM  Gen Surg saw patient.  rec obs to medicine with GI consult in am  Advance to clear liquids  D/w patient.  Would like to stay.                      Clinical Impression:   Final diagnoses:  [K56.600] Partial small bowel obstruction (Primary)          ED Disposition Condition    Observation               Micheal Smith MD  06/07/22 5172

## 2022-06-07 NOTE — ED NOTES
Anayeli Judd, a 77 y.o. female presents to the ED w/ complaint of chronic RLQ abdominal pain. Reports diarrhea     Triage note:  Chief Complaint   Patient presents with    Abdominal Pain     Hx of scar tissue in colon that causes chronic pain, reports diarrhea. Denies n/v     Review of patient's allergies indicates:   Allergen Reactions    Codeine Itching and Nausea And Vomiting    Dilaudid [hydromorphone (bulk)] Other (See Comments)     Oversedating, head burning. Pt prefers to avoid.       Percocet [oxycodone-acetaminophen] Itching    Sulfa (sulfonamide antibiotics) Itching and Nausea And Vomiting           Latex, natural rubber Rash     Past Medical History:   Diagnosis Date    Abdominal pain     Allergic rhinitis     Arthritis     Blood platelet disorder     Blood platelet disorder     Blood transfusion     during delivery and     Bowel obstruction     Cervical radiculopathy     followed by dr cloud    Colon cancer     transverse colon; resected; Stage IIA (pT3 pN0 MX)    Diabetes mellitus     Diarrhea     Falls 2020    Family history of breast cancer     Family history of colon cancer     Fatty liver     GERD (gastroesophageal reflux disease)     History of shingles     Hyperlipidemia     Hypothyroidism     Irritable bowel syndrome     Microscopic colitis     treated     Myelodysplastic syndrome     Raynaud phenomenon     Raynaud's disease     Type 2 diabetes mellitus      LOC: The patient is awake, alert and aware of environment with an appropriate affect, the patient is oriented x 3 and speaking appropriately.   APPEARANCE: Patient appears comfortable and in no acute distress, patient is clean and well groomed.  SKIN: The skin is warm and dry, color consistent with ethnicity, patient has normal skin turgor and moist mucus membranes, skin intact, no breakdown or bruising noted.   MUSCULOSKELETAL: Patient moving all extremities spontaneously, no swelling  noted.  RESPIRATORY: Airway is open and patent, respirations are spontaneous, patient has a normal effort and rate, no accessory muscle use noted, pt placed on continuous pulse ox with O2 sats noted at 97% on room air.  CARDIAC:  Patient has a normal rate and regular rhythm, no edema noted, capillary refill < 3 seconds.   GASTRO: reports abdominal pain  : Pt denies any pain or frequency with urination.  NEURO: Pt opens eyes spontaneously, behavior appropriate to situation, follows commands, facial expression symmetrical, bilateral hand grasp equal and even, purposeful motor response noted, normal sensation in all extremities when touched with a finger.

## 2022-06-07 NOTE — ASSESSMENT & PLAN NOTE
77 y.o. 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 Most recently underwent ex-lap and attempted TIFFANY 3  Months ago and recent observation for psbo which resolved <24hrs. Returns today with abdominal pain and increase diarrhea. CT Scan relatively stable with persistent small bowel dilation. Lab work unremarkable.      -No clinical evidence of pSBO with persistent bowel function, and stable, if not slightly improved CT scan from previous admission  - No acute surgical intervention warranted   -Would recommend clear liquids, PRN pain management trying to minimize narcotics  - Would recommend consult to internal medicine and possible inpatient GI consult for optimization of bowel regimen, GI motility, and dietary modifications that may assist in her symptomatology.

## 2022-06-07 NOTE — SUBJECTIVE & OBJECTIVE
No current facility-administered medications on file prior to encounter.     Current Outpatient Medications on File Prior to Encounter   Medication Sig    acetaminophen (TYLENOL) 650 MG TbSR Take 650 mg by mouth once as needed (pain).    ascorbic acid, vitamin C, (VITAMIN C) 1000 MG tablet Take 1,000 mg by mouth once daily.    atorvastatin (LIPITOR) 40 MG tablet TAKE 1 TABLET BY MOUTH  DAILY    azelastine (ASTELIN) 137 mcg (0.1 %) nasal spray 2 sprays (274 mcg total) by Nasal route 2 (two) times daily as needed for Rhinitis.    biotin 10,000 mcg TbDL Take 1 tablet by mouth once daily.     calcium-vitamin D3 (OS-KASSANDRA 500 + D3) 500 mg-5 mcg (200 unit) per tablet Take 1,200 tablets by mouth once daily.    cefUROXime (CEFTIN) 500 MG tablet Take 1 tablet (500 mg total) by mouth every 12 (twelve) hours.    co-enzyme Q-10 30 mg capsule Take 30 mg by mouth 3 (three) times daily.    conjugated estrogens (PREMARIN) vaginal cream INSERT 1/2 GRAM VAGINALLY  TWICE WEEKLY    cranberry extract 200 mg Cap Take 1 capsule by mouth once daily.    D-MANNOSE ORAL Take 1 tablet by mouth once daily.     diclofenac sodium (VOLTAREN) 1 % Gel Apply 2 g topically 4 (four) times daily as needed (Back pain).    donepeziL (ARICEPT) 5 MG tablet Take 1 tablet (5 mg total) by mouth every evening.    DULoxetine (CYMBALTA) 30 MG capsule Take 1 capsule (30 mg total) by mouth 2 (two) times daily.    famotidine (PEPCID) 20 MG tablet Take 1 tablet (20 mg total) by mouth 2 (two) times daily as needed for Heartburn.    flash glucose scanning reader (FREESTYLE EFREN 14 DAY READER) Misc Check blood glucose level 3 times daily.    flash glucose sensor (FREESTYLE EFREN 14 DAY SENSOR) Kit 1 application by Misc.(Non-Drug; Combo Route) route every 14 (fourteen) days. Disp 30 or 90 day refill    fluticasone propionate (FLONASE) 50 mcg/actuation nasal spray 1 spray by Each Nostril route daily as needed for Rhinitis.    FREESTYLE LITE STRIPS Strp TEST DAILY AS  DIRECTED    gabapentin (NEURONTIN) 600 MG tablet Take 1 tablet (600 mg total) by mouth 2 (two) times daily.    hydrocortisone 2.5 % cream Apply topically 2 (two) times daily as needed.    hyoscyamine (ANASPAZ,LEVSIN) 0.125 mg Tab TAKE 1 TABLET(125 MCG) BY MOUTH EVERY 8 HOURS AS NEEDED FOR URGENCY    levothyroxine (SYNTHROID) 75 MCG tablet Take 1 tablet (75 mcg total) by mouth before breakfast.    LIDOcaine (LIDODERM) 5 % Place 1 patch onto the skin once daily. Remove & Discard patch within 12 hours or as directed by MD    magnesium 250 mg Tab Take 1,000 mg by mouth once daily.    metFORMIN (GLUCOPHAGE) 500 MG tablet TAKE 1 TABLET(500 MG) BY MOUTH DAILY WITH BREAKFAST    montelukast (SINGULAIR) 10 mg tablet TAKE 1 TABLET BY MOUTH  DAILY    ondansetron (ZOFRAN-ODT) 8 MG TbDL Take 1 tablet (8 mg total) by mouth every 8 (eight) hours as needed (nausea).    pantoprazole (PROTONIX) 20 MG tablet Take 1 tablet (20 mg total) by mouth once daily.    polyethylene glycol (GLYCOLAX) 17 gram/dose powder Take 17 g by mouth before dinner.    vitamin D 1000 units Tab Take 1,000 Units by mouth once daily. D3       Review of patient's allergies indicates:   Allergen Reactions    Codeine Itching and Nausea And Vomiting    Dilaudid [hydromorphone (bulk)] Other (See Comments)     Oversedating, head burning. Pt prefers to avoid.       Percocet [oxycodone-acetaminophen] Itching    Sulfa (sulfonamide antibiotics) Itching and Nausea And Vomiting           Latex, natural rubber Rash       Past Medical History:   Diagnosis Date    Abdominal pain     Allergic rhinitis     Arthritis     Blood platelet disorder     Blood platelet disorder     Blood transfusion     during delivery and     Bowel obstruction     Cervical radiculopathy     followed by dr cloud    Colon cancer     transverse colon; resected; Stage IIA (pT3 pN0 MX)    Diabetes mellitus     Diarrhea     Falls 2020    Family history of breast cancer     Family history of  colon cancer     Fatty liver     GERD (gastroesophageal reflux disease)     History of shingles     Hyperlipidemia     Hypothyroidism     Irritable bowel syndrome     Microscopic colitis     treated     Myelodysplastic syndrome     Raynaud phenomenon     Raynaud's disease     Type 2 diabetes mellitus      Past Surgical History:   Procedure Laterality Date    ADENOIDECTOMY      APPENDECTOMY      BACK SURGERY      CARPAL TUNNEL RELEASE      bilateral      SECTION      CHOLECYSTECTOMY  1965    COLECTOMY  2011    Transverse colon resection by Dr. Aguirre    COLONOSCOPY N/A 2017    Procedure: COLONOSCOPY;  Surgeon: Manjit Alvarez MD;  Location: [facility] (4TH FLR);  Service: Endoscopy;  Laterality: N/A;    COLONOSCOPY N/A 2019    Procedure: COLONOSCOPY;  Surgeon: Ramiro Jefferson MD;  Location: [facility] (4TH FLR);  Service: Endoscopy;  Laterality: N/A;  PM prep    COLONOSCOPY N/A 2022    Procedure: COLONOSCOPY;  Surgeon: Ramiro Jefferson MD;  Location: [facility] (2ND FLR);  Service: Endoscopy;  Laterality: N/A;  EGD and colonoscopy in 6-8 weeks from now     states has constipation. -extended Golytely prep    CYSTOSCOPY N/A 2021    Procedure: CYSTOSCOPY;  Surgeon: Viridiana Valenzuela MD;  Location: [facility] OR 1ST FLR;  Service: Urology;  Laterality: N/A;    ENDOSCOPIC ULTRASOUND OF UPPER GASTROINTESTINAL TRACT N/A 2018    Procedure: ULTRASOUND, UPPER GI TRACT, ENDOSCOPIC;  Surgeon: Jose Hess MD;  Location: [facility];  Service: Endoscopy;  Laterality: N/A;    ENDOSCOPIC ULTRASOUND OF UPPER GASTROINTESTINAL TRACT N/A 2019    Procedure: ULTRASOUND, UPPER GI TRACT, ENDOSCOPIC;  Surgeon: Jose Hess MD;  Location: [facility] (2ND FLR);  Service: Endoscopy;  Laterality: N/A;    ESOPHAGOGASTRODUODENOSCOPY N/A 2018    Procedure: EGD (ESOPHAGOGASTRODUODENOSCOPY);  Surgeon: Angelo Reynolds MD;  Location: [facility] (2ND FLR);  Service: Endoscopy;  Laterality:  N/A;    ESOPHAGOGASTRODUODENOSCOPY N/A 06/26/2019    Procedure: EGD (ESOPHAGOGASTRODUODENOSCOPY);  Surgeon: Ramiro Jefferson MD;  Location: [facility] ENDO (4TH FLR);  Service: Endoscopy;  Laterality: N/A;    ESOPHAGOGASTRODUODENOSCOPY N/A 5/4/2022    Procedure: EGD (ESOPHAGOGASTRODUODENOSCOPY);  Surgeon: Ramiro Jefferson MD;  Location: [facility] ENDO (2ND FLR);  Service: Endoscopy;  Laterality: N/A;  fully vaccinated-GT    EYE SURGERY      Cataract Removal    FLUOROSCOPIC URODYNAMIC STUDY N/A 11/09/2021    Procedure: URODYNAMIC STUDY, FLUOROSCOPIC;  Surgeon: Viridiana Valenzuela MD;  Location: [facility] OR 1ST FLR;  Service: Urology;  Laterality: N/A;  90 minutes     HYSTERECTOMY      JOINT REPLACEMENT      posterolateral fusion with autograft bone and Colony mineralized bone matrix  02/01/2013    at [facility] for lumbar spine stenosis    SMALL INTESTINE SURGERY      SPINE SURGERY      TOE SURGERY      TONSILLECTOMY      TRIGGER FINGER RELEASE       Family History       Problem Relation (Age of Onset)    Alcohol abuse Brother, Brother, Brother    Arthritis Daughter, Brother, Brother    Asthma Daughter, Daughter    Bladder Cancer Mother    Breast cancer Sister (79)    Cataracts Mother    Colon cancer Father, Brother (70)    Depression Daughter, Daughter, Daughter    Glaucoma Mother    Heart disease Father (50), Mother    Hyperlipidemia Mother    Hypertension Daughter    Kidney disease Mother    Parkinsonism Brother    Stroke Daughter (40), Daughter          Tobacco Use    Smoking status: Never Smoker    Smokeless tobacco: Never Used   Substance and Sexual Activity    Alcohol use: Not Currently     Alcohol/week: 3.0 standard drinks     Types: 3 Glasses of wine per week     Comment: socially, hardly ever    Drug use: Never    Sexual activity: Not Currently     Review of Systems   Constitutional:  Negative for activity change, appetite change, chills, fatigue and fever.   Respiratory:  Negative for cough and shortness of breath.     Cardiovascular:  Negative for chest pain and palpitations.   Gastrointestinal:  Positive for abdominal distention, abdominal pain, diarrhea and nausea. Negative for blood in stool, constipation and vomiting.   Genitourinary:  Positive for difficulty urinating.   Musculoskeletal:  Negative for arthralgias and back pain.   Skin:  Negative for color change and wound.   Neurological:  Negative for dizziness and weakness.   Hematological:  Negative for adenopathy. Does not bruise/bleed easily.   Objective:     Vital Signs (Most Recent):  Temp: 98.6 °F (37 °C) (06/07/22 1724)  Pulse: 89 (06/07/22 1724)  Resp: 18 (06/07/22 1724)  BP: 118/76 (06/07/22 1724)  SpO2: 99 % (06/07/22 1724) Vital Signs (24h Range):  Temp:  [97.7 °F (36.5 °C)-98.6 °F (37 °C)] 98.6 °F (37 °C)  Pulse:  [63-89] 89  Resp:  [16-18] 18  SpO2:  [99 %-100 %] 99 %  BP: (107-118)/(57-76) 118/76     Weight: 56.7 kg (125 lb)  Body mass index is 22.86 kg/m².    Physical Exam  Vitals and nursing note reviewed.   Constitutional:       General: She is not in acute distress.     Appearance: Normal appearance. She is not ill-appearing, toxic-appearing or diaphoretic.   HENT:      Head: Normocephalic.   Cardiovascular:      Rate and Rhythm: Normal rate and regular rhythm.   Abdominal:      General: Abdomen is flat. There is no distension.      Palpations: Abdomen is soft. There is no mass.      Tenderness: There is abdominal tenderness (minimal right sided).      Hernia: No hernia is present.      Comments: Previous laparotomy incision well healed    Musculoskeletal:         General: Normal range of motion.   Skin:     General: Skin is warm and dry.      Capillary Refill: Capillary refill takes less than 2 seconds.   Neurological:      General: No focal deficit present.      Mental Status: She is alert.       Significant Labs:  I have reviewed all pertinent lab results within the past 24 hours.  CBC:   Recent Labs   Lab 06/07/22  1439   WBC 6.59   RBC 4.11   HGB  13.2   HCT 39.3   PLT 89*   MCV 96   MCH 32.1*   MCHC 33.6     BMP:   Recent Labs   Lab 06/07/22  1439   *   *   K 4.5      CO2 20*   BUN 12   CREATININE 0.8   CALCIUM 10.2     CMP:   Recent Labs   Lab 06/07/22  1439   *   CALCIUM 10.2   ALBUMIN 4.1   PROT 7.5   *   K 4.5   CO2 20*      BUN 12   CREATININE 0.8   ALKPHOS 70   ALT 32   AST 34   BILITOT 1.3*     LFTs:   Recent Labs   Lab 06/07/22  1439   ALT 32   AST 34   ALKPHOS 70   BILITOT 1.3*   PROT 7.5   ALBUMIN 4.1     Coagulation: No results for input(s): LABPROT, INR, APTT in the last 168 hours.    Significant Diagnostics:  I have reviewed all pertinent imaging results/findings within the past 24 hours.

## 2022-06-08 LAB
ALBUMIN SERPL BCP-MCNC: 3.9 G/DL (ref 3.5–5.2)
ALP SERPL-CCNC: 77 U/L (ref 55–135)
ALT SERPL W/O P-5'-P-CCNC: 34 U/L (ref 10–44)
ANION GAP SERPL CALC-SCNC: 9 MMOL/L (ref 8–16)
AST SERPL-CCNC: 36 U/L (ref 10–40)
BASOPHILS # BLD AUTO: 0.03 K/UL (ref 0–0.2)
BASOPHILS NFR BLD: 0.7 % (ref 0–1.9)
BILIRUB SERPL-MCNC: 1.6 MG/DL (ref 0.1–1)
BUN SERPL-MCNC: 9 MG/DL (ref 8–23)
C DIFF GDH STL QL: NEGATIVE
C DIFF TOX A+B STL QL IA: NEGATIVE
CALCIUM SERPL-MCNC: 9.7 MG/DL (ref 8.7–10.5)
CHLORIDE SERPL-SCNC: 105 MMOL/L (ref 95–110)
CO2 SERPL-SCNC: 27 MMOL/L (ref 23–29)
CREAT SERPL-MCNC: 0.7 MG/DL (ref 0.5–1.4)
DIFFERENTIAL METHOD: ABNORMAL
EOSINOPHIL # BLD AUTO: 0.2 K/UL (ref 0–0.5)
EOSINOPHIL NFR BLD: 4.7 % (ref 0–8)
ERYTHROCYTE [DISTWIDTH] IN BLOOD BY AUTOMATED COUNT: 13.5 % (ref 11.5–14.5)
EST. GFR  (AFRICAN AMERICAN): >60 ML/MIN/1.73 M^2
EST. GFR  (NON AFRICAN AMERICAN): >60 ML/MIN/1.73 M^2
GLUCOSE SERPL-MCNC: 120 MG/DL (ref 70–110)
HCT VFR BLD AUTO: 39.1 % (ref 37–48.5)
HGB BLD-MCNC: 12.6 G/DL (ref 12–16)
IMM GRANULOCYTES # BLD AUTO: 0.01 K/UL (ref 0–0.04)
IMM GRANULOCYTES NFR BLD AUTO: 0.2 % (ref 0–0.5)
LYMPHOCYTES # BLD AUTO: 1.3 K/UL (ref 1–4.8)
LYMPHOCYTES NFR BLD: 31.2 % (ref 18–48)
MAGNESIUM SERPL-MCNC: 1.7 MG/DL (ref 1.6–2.6)
MCH RBC QN AUTO: 31.5 PG (ref 27–31)
MCHC RBC AUTO-ENTMCNC: 32.2 G/DL (ref 32–36)
MCV RBC AUTO: 98 FL (ref 82–98)
MONOCYTES # BLD AUTO: 0.4 K/UL (ref 0.3–1)
MONOCYTES NFR BLD: 8.5 % (ref 4–15)
NEUTROPHILS # BLD AUTO: 2.3 K/UL (ref 1.8–7.7)
NEUTROPHILS NFR BLD: 54.7 % (ref 38–73)
NRBC BLD-RTO: 0 /100 WBC
PLATELET # BLD AUTO: 77 K/UL (ref 150–450)
PMV BLD AUTO: 10.6 FL (ref 9.2–12.9)
POCT GLUCOSE: 101 MG/DL (ref 70–110)
POCT GLUCOSE: 125 MG/DL (ref 70–110)
POCT GLUCOSE: 99 MG/DL (ref 70–110)
POTASSIUM SERPL-SCNC: 3.8 MMOL/L (ref 3.5–5.1)
PROT SERPL-MCNC: 6.7 G/DL (ref 6–8.4)
RBC # BLD AUTO: 4 M/UL (ref 4–5.4)
SODIUM SERPL-SCNC: 141 MMOL/L (ref 136–145)
WBC # BLD AUTO: 4.26 K/UL (ref 3.9–12.7)

## 2022-06-08 PROCEDURE — 87449 NOS EACH ORGANISM AG IA: CPT | Performed by: HOSPITALIST

## 2022-06-08 PROCEDURE — 89055 LEUKOCYTE ASSESSMENT FECAL: CPT | Performed by: HOSPITALIST

## 2022-06-08 PROCEDURE — 99223 1ST HOSP IP/OBS HIGH 75: CPT | Mod: ,,, | Performed by: FAMILY MEDICINE

## 2022-06-08 PROCEDURE — 85025 COMPLETE CBC W/AUTO DIFF WBC: CPT | Performed by: HOSPITALIST

## 2022-06-08 PROCEDURE — 20600001 HC STEP DOWN PRIVATE ROOM

## 2022-06-08 PROCEDURE — 25000003 PHARM REV CODE 250: Performed by: HOSPITALIST

## 2022-06-08 PROCEDURE — 87427 SHIGA-LIKE TOXIN AG IA: CPT | Mod: 59 | Performed by: HOSPITALIST

## 2022-06-08 PROCEDURE — 25000003 PHARM REV CODE 250: Performed by: FAMILY MEDICINE

## 2022-06-08 PROCEDURE — 80053 COMPREHEN METABOLIC PANEL: CPT | Performed by: HOSPITALIST

## 2022-06-08 PROCEDURE — 83735 ASSAY OF MAGNESIUM: CPT | Performed by: HOSPITALIST

## 2022-06-08 PROCEDURE — 87046 STOOL CULTR AEROBIC BACT EA: CPT | Mod: 59 | Performed by: HOSPITALIST

## 2022-06-08 PROCEDURE — 63600175 PHARM REV CODE 636 W HCPCS: Performed by: FAMILY MEDICINE

## 2022-06-08 PROCEDURE — 99223 PR INITIAL HOSPITAL CARE,LEVL III: ICD-10-PCS | Mod: ,,, | Performed by: INTERNAL MEDICINE

## 2022-06-08 PROCEDURE — 36415 COLL VENOUS BLD VENIPUNCTURE: CPT | Performed by: HOSPITALIST

## 2022-06-08 PROCEDURE — 99223 1ST HOSP IP/OBS HIGH 75: CPT | Mod: ,,, | Performed by: INTERNAL MEDICINE

## 2022-06-08 PROCEDURE — 87045 FECES CULTURE AEROBIC BACT: CPT | Performed by: HOSPITALIST

## 2022-06-08 PROCEDURE — 99223 PR INITIAL HOSPITAL CARE,LEVL III: ICD-10-PCS | Mod: ,,, | Performed by: FAMILY MEDICINE

## 2022-06-08 PROCEDURE — 27000207 HC ISOLATION

## 2022-06-08 RX ORDER — IBUPROFEN 400 MG/1
400 TABLET ORAL ONCE
Status: COMPLETED | OUTPATIENT
Start: 2022-06-08 | End: 2022-06-08

## 2022-06-08 RX ADMIN — IBUPROFEN 400 MG: 400 TABLET, FILM COATED ORAL at 07:06

## 2022-06-08 RX ADMIN — ACETAMINOPHEN 650 MG: 325 TABLET ORAL at 10:06

## 2022-06-08 RX ADMIN — ENOXAPARIN SODIUM 40 MG: 100 INJECTION SUBCUTANEOUS at 04:06

## 2022-06-08 RX ADMIN — MONTELUKAST 10 MG: 10 TABLET, FILM COATED ORAL at 09:06

## 2022-06-08 RX ADMIN — DONEPEZIL HYDROCHLORIDE 5 MG: 5 TABLET, FILM COATED ORAL at 09:06

## 2022-06-08 RX ADMIN — DULOXETINE 30 MG: 30 CAPSULE, DELAYED RELEASE ORAL at 09:06

## 2022-06-08 RX ADMIN — Medication 6 MG: at 09:06

## 2022-06-08 RX ADMIN — LEVOTHYROXINE SODIUM 75 MCG: 75 TABLET ORAL at 05:06

## 2022-06-08 RX ADMIN — ATORVASTATIN CALCIUM 40 MG: 20 TABLET, FILM COATED ORAL at 09:06

## 2022-06-08 RX ADMIN — PANTOPRAZOLE SODIUM 20 MG: 20 TABLET, DELAYED RELEASE ORAL at 09:06

## 2022-06-08 RX ADMIN — SODIUM CHLORIDE, SODIUM LACTATE, POTASSIUM CHLORIDE, AND CALCIUM CHLORIDE: .6; .31; .03; .02 INJECTION, SOLUTION INTRAVENOUS at 07:06

## 2022-06-08 NOTE — HPI
Anayeli Judd is a 77 y.o. 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 She underwent ex-lap and attempted TIFFANY 3 months ago but the procedure was aborted due to dense adhesions. She was admitted most recently on 5/28 for concerns related to recurrent pSBO. At that time she was admitted to obs and had quick return of bowel function. She presents today with 1 day history of abdominal pain, nausea and increase bowel movements. Reports abdominal cramping started this morning. Pain has improved significantly since she has been in the ED. Distention has resolved. She had one episode of nausea this am, no reported emesis. She has had an increase in loose, non-bloody bowel movements today. She normally has 3-4 bowel movements per day, today she has had 7-8, per report from her care taker.      In ED she is HDS. Lab works relatively unremarkable.   CT scan similar to previous with distended loops of small bowel. Noted to have enlarge, distended bladder on CT.   General Surgery consulted and evaluated patient. Felt no surgical indication necessary at this time and recommended admission to  with GI consult. Patient admitted to the care of medicine for further evaluation and management.

## 2022-06-08 NOTE — PLAN OF CARE
Anayeli Judd is a 77 y.o. female with PMH of DM, colon cancer (status post transverse colectomy with mobilization of hepatic/splenic flexures in 2011 with post-op course associated with recurrent SBO), DVT (not on anticoagulation), MDS (not on treatment), and Parkinsonism (with cognitive impairment),  PSH significant for exploratory laparotomies for treatment of SBO  She underwent ex-lap and attempted months ago but the procedure was aborted due to dense adhesions admitted with abdominal pain and diarrhea. CT abdomen -Distended loops of small bowel in the right hemiabdomen with decompressed loops and soft tissue thickening near the midline.  Overall configuration the distended bowel loops appears similar to prior with slightly lesser degree of distension.  Findings may reflect recurrent partial small bowel obstruction. s/p General surgery eval - No acute surgical intervention.   clear liquids, PRN pain management and LR hydration . adavance to gluten free diet . GI consulted  for optimization of bowel regimen. r/o C diff and infectious etiology of diarrhea.  No plans for endoscopic intervention     Martín Louis MD  Attending Staff Physician  [facility]  pager- [phone]  [employer] - [phone]

## 2022-06-08 NOTE — ASSESSMENT & PLAN NOTE
This is a 77 year old female with a PMH significant for colon cancer (status post transverse colectomy with mobilization of hepatic/splenic flexures in 2011 with post-op course associated with recurrent SBO), DVT (not on anticoagulation), MDS (not on treatment), and Parkinsonism (with cognitive impairment). She has a PSH significant for exploratory laparotomies for treatment of SBO (most recently in 03/2022 with inability to repair due to dense adhesions). She presented to [facility] on 06/07/2022 with acute onset of abdominal cramping associated with increase frequency of non-bloody diarrhea. Her presentation is notable for absent signs/symptoms of sepsis and stable small bowel dilation on CT scan. She is status post prior celiac serologies and small bowel biopsy without evidence of celiac (although unclear if patient was consuming gluten at that time). Her history of alternating constipation and diarrhea may represent IBS with mixed bowel habits versus abnormalities with gut motility secondary to multiple prior abdominal surgeries. Given pre-dominant diarrhea and recent hospitalization, it worth work-up for infectious cause of diarrhea.     Recommendations:     -Obtain stool studies for C.diff if possible and treat if positive.   -If stool studies negative for infection, okay to intiate scheduled anti-motility agents with IMODIUM TID or QUESTRAN 4 g QID with instructions to stop medication when bowel movement frequency decreases to less than 3/day.   -Would avoid BENTYL given history of recurrent SBO.   -Okay for gluten free diet from GI standpoint.   -No plans for endoscopic intervention given recent EGD and colonoscopy in 05/2022 that were normal.

## 2022-06-08 NOTE — PLAN OF CARE
No BM overnight nor c/o pain   Diet advanced and pt tolerated sips of water   Able to go to the bathroom with x 1 assist   VSS           Problem: Adult Inpatient Plan of Care  Goal: Plan of Care Review  Outcome: Ongoing, Progressing  Goal: Patient-Specific Goal (Individualized)  Outcome: Ongoing, Progressing  Goal: Absence of Hospital-Acquired Illness or Injury  Outcome: Ongoing, Progressing  Goal: Optimal Comfort and Wellbeing  Outcome: Ongoing, Progressing  Goal: Readiness for Transition of Care  Outcome: Ongoing, Progressing     Problem: Diabetes Comorbidity  Goal: Blood Glucose Level Within Targeted Range  Outcome: Ongoing, Progressing

## 2022-06-08 NOTE — PLAN OF CARE
Pt Aox4. VSS. No signs of respiratory distress on RA. Pt remained injury free through shift. Stool sample colleted. Will continue POC.

## 2022-06-08 NOTE — H&P
[facility] Medicine  History & Physical    Patient Name: Anayeli Judd  MRN: [id number]  Patient Class: IP- Inpatient  Admission Date: 6/7/2022  Attending Physician: Martín Louis MD   Primary Care Provider: Darci Guthrie MD         Patient information was obtained from patient, past medical records and ER records.     Subjective:     Principal Problem:Partial small bowel obstruction    Chief Complaint:   Chief Complaint   Patient presents with    Abdominal Pain     Hx of scar tissue in colon that causes chronic pain, reports diarrhea. Denies n/v        HPI: Anayeli Judd is a 77 y.o. 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 She underwent ex-lap and attempted TIFFANY 3 months ago but the procedure was aborted due to dense adhesions. She was admitted most recently on 5/28 for concerns related to recurrent pSBO. At that time she was admitted to [facility] and had quick return of bowel function. She presents today with 1 day history of abdominal pain, nausea and increase bowel movements. Reports abdominal cramping started this morning. Pain has improved significantly since she has been in the ED. Distention has resolved. She had one episode of nausea this am, no reported emesis. She has had an increase in loose, non-bloody bowel movements today. She normally has 3-4 bowel movements per day, today she has had 7-8, per report from her care taker.      In ED she is HDS. Lab works relatively unremarkable. CT scan similar to previous with distended loops of small bowel. Noted to have enlarge, distended bladder on CT. General Surgery consulted and evaluated patient. Felt no surgical indication necessary at this time and recommended admission to  with GI consult. Patient admitted to the care of medicine for further evaluation and management.      Past Medical History:   Diagnosis Date    Abdominal pain     Allergic rhinitis     Arthritis      Blood platelet disorder     Blood platelet disorder     Blood transfusion     during delivery and     Bowel obstruction     Cervical radiculopathy     followed by dr cloud    Colon cancer     transverse colon; resected; Stage IIA (pT3 pN0 MX)    Diabetes mellitus     Diarrhea     Falls 2020    Family history of breast cancer     Family history of colon cancer     Fatty liver     GERD (gastroesophageal reflux disease)     History of shingles     Hyperlipidemia     Hypothyroidism     Irritable bowel syndrome     Microscopic colitis     treated     Myelodysplastic syndrome     Raynaud phenomenon     Raynaud's disease     Type 2 diabetes mellitus        Past Surgical History:   Procedure Laterality Date    ADENOIDECTOMY      APPENDECTOMY      BACK SURGERY      CARPAL TUNNEL RELEASE      bilateral      SECTION      CHOLECYSTECTOMY  1965    COLECTOMY  2011    Transverse colon resection by Dr. Aguirre    COLONOSCOPY N/A 2017    Procedure: COLONOSCOPY;  Surgeon: Manjit Alvarez MD;  Location: [facility] (4TH FLR);  Service: Endoscopy;  Laterality: N/A;    COLONOSCOPY N/A 2019    Procedure: COLONOSCOPY;  Surgeon: Ramiro Jefferson MD;  Location: [facility] (4TH FLR);  Service: Endoscopy;  Laterality: N/A;  PM prep    COLONOSCOPY N/A 2022    Procedure: COLONOSCOPY;  Surgeon: Ramiro Jefferson MD;  Location: [facility] (2ND FLR);  Service: Endoscopy;  Laterality: N/A;  EGD and colonoscopy in 6-8 weeks from now     states has constipation. -extended Golytely prep    CYSTOSCOPY N/A 2021    Procedure: CYSTOSCOPY;  Surgeon: Viridiana Valenzuela MD;  [facility];  Service: Urology;  Laterality: N/A;    ENDOSCOPIC ULTRASOUND OF UPPER GASTROINTESTINAL TRACT N/A 2018    Procedure: ULTRASOUND, UPPER GI TRACT, ENDOSCOPIC;  Surgeon: Jose Hess MD;  Location: [facility];  Service: Endoscopy;  Laterality: N/A;    ENDOSCOPIC  ULTRASOUND OF UPPER GASTROINTESTINAL TRACT N/A 01/23/2019    Procedure: ULTRASOUND, UPPER GI TRACT, ENDOSCOPIC;  Surgeon: Jose Hess MD;  Location: [facility] ENDO (2ND FLR);  Service: Endoscopy;  Laterality: N/A;    ESOPHAGOGASTRODUODENOSCOPY N/A 11/16/2018    Procedure: EGD (ESOPHAGOGASTRODUODENOSCOPY);  Surgeon: Angelo Reynolds MD;  Location: [facility] ENDO (2ND FLR);  Service: Endoscopy;  Laterality: N/A;    ESOPHAGOGASTRODUODENOSCOPY N/A 06/26/2019    Procedure: EGD (ESOPHAGOGASTRODUODENOSCOPY);  Surgeon: Ramiro Jefferson MD;  Location: [facility] ENDO (4TH FLR);  Service: Endoscopy;  Laterality: N/A;    ESOPHAGOGASTRODUODENOSCOPY N/A 5/4/2022    Procedure: EGD (ESOPHAGOGASTRODUODENOSCOPY);  Surgeon: Ramiro Jefferson MD;  Location: [facility] ENDO (2ND FLR);  Service: Endoscopy;  Laterality: N/A;  fully vaccinated-GT    EYE SURGERY      Cataract Removal    FLUOROSCOPIC URODYNAMIC STUDY N/A 11/09/2021    Procedure: URODYNAMIC STUDY, FLUOROSCOPIC;  Surgeon: Viridiana Valenzuela MD;  Location: [facility] OR 1ST FLR;  Service: Urology;  Laterality: N/A;  90 minutes     HYSTERECTOMY      JOINT REPLACEMENT      posterolateral fusion with autograft bone and Eun mineralized bone matrix  02/01/2013    at [facility] for lumbar spine stenosis    SMALL INTESTINE SURGERY      SPINE SURGERY      TOE SURGERY      TONSILLECTOMY      TRIGGER FINGER RELEASE         Review of patient's allergies indicates:   Allergen Reactions    Codeine Itching and Nausea And Vomiting    Dilaudid [hydromorphone (bulk)] Other (See Comments)     Oversedating, head burning. Pt prefers to avoid.       Percocet [oxycodone-acetaminophen] Itching    Sulfa (sulfonamide antibiotics) Itching and Nausea And Vomiting           Latex, natural rubber Rash       No current facility-administered medications on file prior to encounter.     Current Outpatient Medications on File Prior to Encounter   Medication Sig    acetaminophen (TYLENOL) 650 MG TbSR Take 650 mg  by mouth once as needed (pain).    ascorbic acid, vitamin C, (VITAMIN C) 1000 MG tablet Take 1,000 mg by mouth once daily.    atorvastatin (LIPITOR) 40 MG tablet TAKE 1 TABLET BY MOUTH  DAILY    azelastine (ASTELIN) 137 mcg (0.1 %) nasal spray 2 sprays (274 mcg total) by Nasal route 2 (two) times daily as needed for Rhinitis.    biotin 10,000 mcg TbDL Take 1 tablet by mouth once daily.     calcium-vitamin D3 (OS-KASSANDRA 500 + D3) 500 mg-5 mcg (200 unit) per tablet Take 1,200 tablets by mouth once daily.    cefUROXime (CEFTIN) 500 MG tablet Take 1 tablet (500 mg total) by mouth every 12 (twelve) hours.    co-enzyme Q-10 30 mg capsule Take 30 mg by mouth 3 (three) times daily.    conjugated estrogens (PREMARIN) vaginal cream INSERT 1/2 GRAM VAGINALLY  TWICE WEEKLY    cranberry extract 200 mg Cap Take 1 capsule by mouth once daily.    D-MANNOSE ORAL Take 1 tablet by mouth once daily.     diclofenac sodium (VOLTAREN) 1 % Gel Apply 2 g topically 4 (four) times daily as needed (Back pain).    donepeziL (ARICEPT) 5 MG tablet Take 1 tablet (5 mg total) by mouth every evening.    DULoxetine (CYMBALTA) 30 MG capsule Take 1 capsule (30 mg total) by mouth 2 (two) times daily.    famotidine (PEPCID) 20 MG tablet Take 1 tablet (20 mg total) by mouth 2 (two) times daily as needed for Heartburn.    flash glucose scanning reader (FREESTYLE EFREN 14 DAY READER) Misc Check blood glucose level 3 times daily.    flash glucose sensor (FREESTYLE EFREN 14 DAY SENSOR) Kit 1 application by Misc.(Non-Drug; Combo Route) route every 14 (fourteen) days. Disp 30 or 90 day refill    fluticasone propionate (FLONASE) 50 mcg/actuation nasal spray 1 spray by Each Nostril route daily as needed for Rhinitis.    FREESTYLE LITE STRIPS Strp TEST DAILY AS DIRECTED    gabapentin (NEURONTIN) 600 MG tablet Take 1 tablet (600 mg total) by mouth 2 (two) times daily.    hydrocortisone 2.5 % cream Apply topically 2 (two) times daily as needed.     hyoscyamine (ANASPAZ,LEVSIN) 0.125 mg Tab TAKE 1 TABLET(125 MCG) BY MOUTH EVERY 8 HOURS AS NEEDED FOR URGENCY    levothyroxine (SYNTHROID) 75 MCG tablet Take 1 tablet (75 mcg total) by mouth before breakfast.    LIDOcaine (LIDODERM) 5 % Place 1 patch onto the skin once daily. Remove & Discard patch within 12 hours or as directed by MD    magnesium 250 mg Tab Take 1,000 mg by mouth once daily.    metFORMIN (GLUCOPHAGE) 500 MG tablet TAKE 1 TABLET(500 MG) BY MOUTH DAILY WITH BREAKFAST    montelukast (SINGULAIR) 10 mg tablet TAKE 1 TABLET BY MOUTH  DAILY    ondansetron (ZOFRAN-ODT) 8 MG TbDL Take 1 tablet (8 mg total) by mouth every 8 (eight) hours as needed (nausea).    pantoprazole (PROTONIX) 20 MG tablet Take 1 tablet (20 mg total) by mouth once daily.    polyethylene glycol (GLYCOLAX) 17 gram/dose powder Take 17 g by mouth before dinner.    vitamin D 1000 units Tab Take 1,000 Units by mouth once daily. D3     Family History       Problem Relation (Age of Onset)    Alcohol abuse Brother, Brother, Brother    Arthritis Daughter, Brother, Brother    Asthma Daughter, Daughter    Bladder Cancer Mother    Breast cancer Sister (79)    Cataracts Mother    Colon cancer Father, Brother (70)    Depression Daughter, Daughter, Daughter    Glaucoma Mother    Heart disease Father (50), Mother    Hyperlipidemia Mother    Hypertension Daughter    Kidney disease Mother    Parkinsonism Brother    Stroke Daughter (40), Daughter          Tobacco Use    Smoking status: Never Smoker    Smokeless tobacco: Never Used   Substance and Sexual Activity    Alcohol use: Not Currently     Alcohol/week: 3.0 standard drinks     Types: 3 Glasses of wine per week     Comment: socially, hardly ever    Drug use: Never    Sexual activity: Not Currently     Review of Systems   Constitutional:  Positive for fatigue. Negative for chills and fever.   HENT:  Negative for sore throat and trouble swallowing.    Eyes:  Negative for photophobia and  visual disturbance.   Respiratory:  Negative for cough, shortness of breath and wheezing.    Cardiovascular:  Negative for chest pain, palpitations and leg swelling.   Gastrointestinal:  Positive for abdominal pain and nausea. Negative for abdominal distention, diarrhea and vomiting.   Genitourinary:  Negative for dysuria and hematuria.   Musculoskeletal:  Negative for neck pain and neck stiffness.   Skin:  Negative for rash and wound.   Neurological:  Negative for syncope, weakness, numbness and headaches.   Psychiatric/Behavioral:  Negative for confusion and decreased concentration.    Objective:     Vital Signs (Most Recent):  Temp: 98 °F (36.7 °C) (06/07/22 2359)  Pulse: 71 (06/07/22 2359)  Resp: 16 (06/07/22 2359)  BP: (!) 98/54 (06/07/22 2359)  SpO2: 98 % (06/07/22 2359)   Vital Signs (24h Range):  Temp:  [97.7 °F (36.5 °C)-98.6 °F (37 °C)] 98 °F (36.7 °C)  Pulse:  [58-89] 71  Resp:  [16-18] 16  SpO2:  [98 %-100 %] 98 %  BP: ()/(54-76) 98/54     Weight: 56.7 kg (125 lb)  Body mass index is 22.86 kg/m².    Physical Exam  Constitutional:       General: She is not in acute distress.     Appearance: She is not toxic-appearing or diaphoretic.   HENT:      Head: Normocephalic and atraumatic.      Nose: Nose normal.   Eyes:      General: No scleral icterus.     Extraocular Movements: Extraocular movements intact.      Pupils: Pupils are equal, round, and reactive to light.   Cardiovascular:      Rate and Rhythm: Normal rate and regular rhythm.   Pulmonary:      Effort: Pulmonary effort is normal. No respiratory distress.      Breath sounds: No wheezing or rales.   Abdominal:      General: Abdomen is flat. There is no distension.      Palpations: Abdomen is soft.      Tenderness: There is abdominal tenderness. There is no guarding.   Musculoskeletal:         General: Normal range of motion.      Cervical back: Normal range of motion and neck supple. No rigidity.      Right lower leg: No edema.      Left lower  leg: No edema.   Skin:     General: Skin is warm and dry.      Coloration: Skin is not jaundiced.   Neurological:      General: No focal deficit present.      Mental Status: She is alert and oriented to person, place, and time.      Cranial Nerves: No cranial nerve deficit.   Psychiatric:         Mood and Affect: Mood normal.         Behavior: Behavior normal.         CRANIAL NERVES     CN III, IV, VI   Pupils are equal, round, and reactive to light.     Significant Labs: All pertinent labs within the past 24 hours have been reviewed.  CBC:   Recent Labs   Lab 06/07/22  1439   WBC 6.59   HGB 13.2   HCT 39.3   PLT 89*     CMP:   Recent Labs   Lab 06/07/22  1439   *   K 4.5      CO2 20*   *   BUN 12   CREATININE 0.8   CALCIUM 10.2   PROT 7.5   ALBUMIN 4.1   BILITOT 1.3*   ALKPHOS 70   AST 34   ALT 32   ANIONGAP 10   EGFRNONAA >60.0       Significant Imaging: I have reviewed all pertinent imaging results/findings within the past 24 hours.    Assessment/Plan:     * Partial small bowel obstruction  - CT Abdomen: Distended loops of small bowel in the right hemiabdomen with decompressed loops and soft tissue thickening near the midline.  Overall configuration the distended bowel loops appears similar to prior with slightly lesser degree of distension.  Findings may reflect recurrent partial small bowel obstruction.  Possible small bowel transition point near the probable adhesive change in the right mid abdomen anteriorly.   - patient is passing gas and last BM today 06/08  ;  Currently with nausea without vomiting  - General surgery consulted and evaluated patient  ;  Appreciate recs  - No acute surgical intervention warranted   - clear liquids, PRN pain management trying to minimize narcotics  - inpatient GI consult for optimization of bowel regimen, GI motility, and dietary modifications that may assist in her symptomatology.  - LR 50ml/hr continuous  - if patient develops vomiting would consider NGT  placement  - further management pending clinical course and future study review    Other hyperlipidemia  - home statin      Memory change  - home aricept      Hypothyroid  - home synthroid        VTE Risk Mitigation (From admission, onward)         Ordered     enoxaparin injection 40 mg  Daily         06/07/22 2012     Place sequential compression device  Until discontinued         06/07/22 2012                   Devendra Tapia MD  Department of Hospital Medicine   [facility]

## 2022-06-08 NOTE — ASSESSMENT & PLAN NOTE
- CT Abdomen: Distended loops of small bowel in the right hemiabdomen with decompressed loops and soft tissue thickening near the midline.  Overall configuration the distended bowel loops appears similar to prior with slightly lesser degree of distension.  Findings may reflect recurrent partial small bowel obstruction.  Possible small bowel transition point near the probable adhesive change in the right mid abdomen anteriorly.   - patient is passing gas and last BM today 06/08  ;  Currently with nausea without vomiting  - General surgery consulted and evaluated patient  ;  Appreciate recs  - No acute surgical intervention warranted   - clear liquids, PRN pain management trying to minimize narcotics  - inpatient GI consult for optimization of bowel regimen, GI motility, and dietary modifications that may assist in her symptomatology.  - LR 50ml/hr continuous  - if patient develops vomiting would consider NGT placement  - further management pending clinical course and future study review

## 2022-06-08 NOTE — HPI
Ms. Anayeli Judd is a 77 year old female for whom GI is consulted with concern for diarrhea. She has a PMH significant for colon cancer (status post transverse colectomy with mobilization of hepatic/splenic flexures in 2011 with post-op course associated with recurrent SBO), DVT (not on anticoagulation), MDS (not on treatment), and Parkinsonism (with cognitive impairment). She has a PSH significant for exploratory laparotomies for treatment of SBO (most recently in 03/2022 with inability to repair due to dense adhesions).     Patient reports history of alternative diarrhea and constipation since initial surgical resection of colon cancer that requires alternating use of laxatives and anti-diarrhea agents depending on symptoms. Symptoms are not associated with abdominal pain. She reports developing multiple daily episodes of loose non-bloody stools in approximately early 05/2022 that is aggrevated by PO intake. She approximates having up to 8 bowel movements daily. She denies prior diagnosis of celiac disease, however she has maintained a gluten free diet without improvement in symptoms. She then developed onset of lower abdominal pain on 06/07 associated with nausea and increase in stool frequency which prompted patient to report to ED here at that time.     Hospital Course: On arrival, vital signs normal on room air. Labs notable for normal WBC, normal Hgb, thrombocytopenia (89), and normal renal and liver function. CT A/P showed distended loops of small bowel in right lower abdomen with decompressed bowel near abdominal midline with overall appearance similar to prior CT's. General surgery evaluated patient with no surgical intervention recommended. She was admitted to internal medicine and GI consulted.

## 2022-06-08 NOTE — CONSULTS
[facility]  Gastroenterology  Consult Note    Patient Name: Anayeli Judd  MRN: [id number]  Admission Date: 6/7/2022  Hospital Length of Stay: 1 days  Code Status: Full Code   Attending Provider: Martín Louis MD   Consulting Provider: Ahmet Nair MD  Primary Care Physician: Darci Guthrie MD  Principal Problem:Partial small bowel obstruction    Inpatient consult to Gastroenterology  Consult performed by: Ahmet Nair MD  Consult ordered by: Devendra Tapia MD        Subjective:     HPI:  Ms. Anayeli Judd is a 77 year old female for whom GI is consulted with concern for diarrhea. She has a PMH significant for colon cancer (status post transverse colectomy with mobilization of hepatic/splenic flexures in 2011 with post-op course associated with recurrent SBO), DVT (not on anticoagulation), MDS (not on treatment), and Parkinsonism (with cognitive impairment). She has a PSH significant for exploratory laparotomies for treatment of SBO (most recently in 03/2022 with inability to repair due to dense adhesions).     Patient reports history of alternative diarrhea and constipation since initial surgical resection of colon cancer that requires alternating use of laxatives and anti-diarrhea agents depending on symptoms. Symptoms are not associated with abdominal pain. She reports developing multiple daily episodes of loose non-bloody stools in approximately early 05/2022 that is aggrevated by PO intake. She approximates having up to 8 bowel movements daily. She denies prior diagnosis of celiac disease, however she has maintained a gluten free diet without improvement in symptoms. She then developed onset of lower abdominal pain on 06/07 associated with nausea and increase in stool frequency which prompted patient to report to ED here at that time.     Hospital Course: On arrival, vital signs normal on room air. Labs notable for normal WBC, normal Hgb, thrombocytopenia (89), and normal  renal and liver function. CT A/P showed distended loops of small bowel in right lower abdomen with decompressed bowel near abdominal midline with overall appearance similar to prior CT's. General surgery evaluated patient with no surgical intervention recommended. She was admitted to internal medicine and GI consulted.        Past Medical History:   Diagnosis Date    Abdominal pain     Allergic rhinitis     Arthritis     Blood platelet disorder     Blood platelet disorder     Blood transfusion     during delivery and     Bowel obstruction     Cervical radiculopathy     followed by dr cloud    Colon cancer     transverse colon; resected; Stage IIA (pT3 pN0 MX)    Diabetes mellitus     Diarrhea     Falls 2020    Family history of breast cancer     Family history of colon cancer     Fatty liver     GERD (gastroesophageal reflux disease)     History of shingles     Hyperlipidemia     Hypothyroidism     Irritable bowel syndrome     Microscopic colitis     treated     Myelodysplastic syndrome     Raynaud phenomenon     Raynaud's disease     Type 2 diabetes mellitus        Past Surgical History:   Procedure Laterality Date    ADENOIDECTOMY      APPENDECTOMY      BACK SURGERY      CARPAL TUNNEL RELEASE      bilateral      SECTION      CHOLECYSTECTOMY  1965    COLECTOMY  2011    Transverse colon resection by Dr. Aguirre    COLONOSCOPY N/A 2017    Procedure: COLONOSCOPY;  Surgeon: Manjit Alvarez MD;  Location: [facility] ENDO (4TH FLR);  Service: Endoscopy;  Laterality: N/A;    COLONOSCOPY N/A 2019    Procedure: COLONOSCOPY;  Surgeon: Ramiro Jefferson MD;  Location: [facility] ENDO (4TH FLR);  Service: Endoscopy;  Laterality: N/A;  PM prep    COLONOSCOPY N/A 2022    Procedure: COLONOSCOPY;  Surgeon: Ramiro Jefferson MD;  Location: [facility] ENDO (2ND FLR);  Service: Endoscopy;  Laterality: N/A;  EGD and colonoscopy in 6-8 weeks from now     states has  constipation. -extended Golytely prep    CYSTOSCOPY N/A 11/09/2021    Procedure: CYSTOSCOPY;  Surgeon: Viridiana Valenzuela MD;  Location: [facility] OR 1ST FLR;  Service: Urology;  Laterality: N/A;    ENDOSCOPIC ULTRASOUND OF UPPER GASTROINTESTINAL TRACT N/A 12/12/2018    Procedure: ULTRASOUND, UPPER GI TRACT, ENDOSCOPIC;  Surgeon: Jose Hess MD;  Location: [facility] ENDO;  Service: Endoscopy;  Laterality: N/A;    ENDOSCOPIC ULTRASOUND OF UPPER GASTROINTESTINAL TRACT N/A 01/23/2019    Procedure: ULTRASOUND, UPPER GI TRACT, ENDOSCOPIC;  Surgeon: Jose Hess MD;  Location: [facility] ENDO (2ND FLR);  Service: Endoscopy;  Laterality: N/A;    ESOPHAGOGASTRODUODENOSCOPY N/A 11/16/2018    Procedure: EGD (ESOPHAGOGASTRODUODENOSCOPY);  Surgeon: Angelo Reynolds MD;  Location: [facility] (2ND FLR);  Service: Endoscopy;  Laterality: N/A;    ESOPHAGOGASTRODUODENOSCOPY N/A 06/26/2019    Procedure: EGD (ESOPHAGOGASTRODUODENOSCOPY);  Surgeon: Ramiro Jefferson MD;  Location: [facility] (4TH FLR);  Service: Endoscopy;  Laterality: N/A;    ESOPHAGOGASTRODUODENOSCOPY N/A 5/4/2022    Procedure: EGD (ESOPHAGOGASTRODUODENOSCOPY);  Surgeon: Ramiro Jefferson MD;  Location: [facility] (2ND FLR);  Service: Endoscopy;  Laterality: N/A;  fully vaccinated-GT    EYE SURGERY      Cataract Removal    FLUOROSCOPIC URODYNAMIC STUDY N/A 11/09/2021    Procedure: URODYNAMIC STUDY, FLUOROSCOPIC;  Surgeon: Viridiana Valenzuela MD;  Location: [facility] OR 1ST FLR;  Service: Urology;  Laterality: N/A;  90 minutes     HYSTERECTOMY      JOINT REPLACEMENT      posterolateral fusion with autograft bone and Eun mineralized bone matrix  02/01/2013    at [facility] for lumbar spine stenosis    SMALL INTESTINE SURGERY      SPINE SURGERY      TOE SURGERY      TONSILLECTOMY      TRIGGER FINGER RELEASE         Review of patient's allergies indicates:   Allergen Reactions    Codeine Itching and Nausea And Vomiting    Dilaudid [hydromorphone (bulk)] Other (See  Comments)     Oversedating, head burning. Pt prefers to avoid.       Percocet [oxycodone-acetaminophen] Itching    Sulfa (sulfonamide antibiotics) Itching and Nausea And Vomiting           Latex, natural rubber Rash     Family History       Problem Relation (Age of Onset)    Alcohol abuse Brother, Brother, Brother    Arthritis Daughter, Brother, Brother    Asthma Daughter, Daughter    Bladder Cancer Mother    Breast cancer Sister (79)    Cataracts Mother    Colon cancer Father, Brother (70)    Depression Daughter, Daughter, Daughter    Glaucoma Mother    Heart disease Father (50), Mother    Hyperlipidemia Mother    Hypertension Daughter    Kidney disease Mother    Parkinsonism Brother    Stroke Daughter (40), Daughter          Tobacco Use    Smoking status: Never Smoker    Smokeless tobacco: Never Used   Substance and Sexual Activity    Alcohol use: Not Currently     Alcohol/week: 3.0 standard drinks     Types: 3 Glasses of wine per week     Comment: socially, hardly ever    Drug use: Never    Sexual activity: Not Currently     Review of Systems   Constitutional:  Positive for fatigue. Negative for appetite change, chills and fever.   HENT:  Negative for congestion and trouble swallowing.    Eyes:  Negative for pain and redness.   Respiratory:  Negative for cough and shortness of breath.    Cardiovascular:  Negative for chest pain and palpitations.   Gastrointestinal:  Positive for abdominal pain and diarrhea. Negative for blood in stool, constipation, nausea, rectal pain and vomiting.   Genitourinary:  Negative for difficulty urinating and dysuria.   Musculoskeletal:  Negative for back pain and neck pain.   Skin:  Negative for rash.   Neurological:  Positive for tremors and weakness. Negative for dizziness, light-headedness and headaches.   Objective:     Vital Signs (Most Recent):  Temp: 97.9 °F (36.6 °C) (06/08/22 1144)  Pulse: 63 (06/08/22 1144)  Resp: 16 (06/08/22 1144)  BP: (!) 95/51 (06/08/22  1144)  SpO2: 97 % (06/08/22 1144)   Vital Signs (24h Range):  Temp:  [97.7 °F (36.5 °C)-99 °F (37.2 °C)] 97.9 °F (36.6 °C)  Pulse:  [58-89] 63  Resp:  [16-18] 16  SpO2:  [97 %-100 %] 97 %  BP: ()/(51-76) 95/51     Weight: 56.7 kg (125 lb) (06/07/22 2300)  Body mass index is 22.86 kg/m².      Intake/Output Summary (Last 24 hours) at 6/8/2022 1222  Last data filed at 6/7/2022 1724  Gross per 24 hour   Intake 1000 ml   Output --   Net 1000 ml       Lines/Drains/Airways       Peripheral Intravenous Line  Duration                  Peripheral IV - Single Lumen 06/07/22 1437 24 G Right Hand <1 day                    Physical Exam  Constitutional:       Appearance: Normal appearance. She is not ill-appearing.   Eyes:      General: No scleral icterus.     Conjunctiva/sclera: Conjunctivae normal.   Cardiovascular:      Rate and Rhythm: Normal rate and regular rhythm.      Pulses: Normal pulses.      Heart sounds: Normal heart sounds.   Pulmonary:      Effort: Pulmonary effort is normal. No respiratory distress.      Breath sounds: Normal breath sounds.   Abdominal:      General: Bowel sounds are normal. There is no distension.      Palpations: Abdomen is soft.      Tenderness: There is no abdominal tenderness.      Comments: Well-healed surgical scar in abdominal midline.    Skin:     General: Skin is warm and dry.      Findings: No bruising or rash.   Neurological:      Mental Status: She is alert and oriented to person, place, and time.       Significant Labs:  All pertinent lab results from the last 24 hours have been reviewed.    Significant Imaging:  Imaging results within the past 24 hours have been reviewed.    Assessment/Plan:     Abdominal discomfort  This is a 77 year old female with a PMH significant for colon cancer (status post transverse colectomy with mobilization of hepatic/splenic flexures in 2011 with post-op course associated with recurrent SBO), DVT (not on anticoagulation), MDS (not on treatment),  and Parkinsonism (with cognitive impairment). She has a PSH significant for exploratory laparotomies for treatment of SBO (most recently in 03/2022 with inability to repair due to dense adhesions). She presented to [facility] on 06/07/2022 with acute onset of abdominal cramping associated with increase frequency of non-bloody diarrhea. Her presentation is notable for absent signs/symptoms of sepsis and stable small bowel dilation on CT scan. She is status post prior celiac serologies and small bowel biopsy without evidence of celiac (although unclear if patient was consuming gluten at that time). Her history of alternating constipation and diarrhea may represent IBS with mixed bowel habits versus abnormalities with gut motility secondary to multiple prior abdominal surgeries. Given pre-dominant diarrhea and recent hospitalization, it worth work-up for infectious cause of diarrhea.     Recommendations:     -Obtain stool studies for C.diff if possible and treat if positive.   -If stool studies negative for infection, okay to intiate scheduled anti-motility agents with IMODIUM TID or QUESTRAN 4 g QID with instructions to stop medication when bowel movement frequency decreases to less than 3/day.   -Would avoid BENTYL given history of recurrent SBO.   -Okay for gluten free diet from GI standpoint.   -No plans for endoscopic intervention given recent EGD and colonoscopy in 05/2022 that were normal.         Thank you for your consult. Please contact us if you have any additional questions.    Ahmet Nair MD  Gastroenterology  [facility]

## 2022-06-08 NOTE — PLAN OF CARE
[facility]  Initial Discharge Assessment       Primary Care Provider: Darci Guthrie MD    Admission Diagnosis: Partial small bowel obstruction [K56.600]    Admission Date: 6/7/2022  Expected Discharge Date: 6/10/2022    Discharge Barriers Identified: None    Payor: MEDICARE / Plan: MEDICARE PART A & B / Product Type: Government /     Extended Emergency Contact Information  Primary Emergency Contact: Danielle Whitley   [place]  Mobile Phone: [phone]  Relation: Daughter  Secondary Emergency Contact: Ivan Hornerelle  Address: [address] [address] [facility]  Home Phone: [phone]  Relation: Daughter    Discharge Plan A: Home  Discharge Plan B: Home Health      [id number] - [address] [address]  Phone: [phone] Fax: [phone]    [facility] - [address] [address]  Phone: [phone] Fax: [phone]      Initial Assessment (most recent)     Adult Discharge Assessment - 06/08/22 1120        Discharge Assessment    Assessment Type Discharge Planning Assessment     Confirmed/corrected address, phone number and insurance Yes     Confirmed Demographics Correct on Facesheet     Source of Information patient     Communicated LOI with patient/caregiver Yes     Lives With alone   Has sitter 24/7    Do you expect to return to your current living situation? Yes     Do you have help at home or someone to help you manage your care at home? Yes     Prior to hospitilization cognitive status: Unable to Assess     Current cognitive status: Alert/Oriented     Walking or Climbing Stairs Difficulty ambulation difficulty, requires equipment     Dressing/Bathing Difficulty none     Equipment Currently Used at Home cane, straight      Readmission within 30 days? Yes     Do you currently have service(s) that help you manage your care at home? No     Do you take prescription medications? Yes     Do you have prescription coverage? Yes     Is the patient taking medications as prescribed? yes     Who is going to help you get home at discharge? Patient's [place]     Are you on dialysis? No     Do you take coumadin? No     Discharge Plan A Home     Discharge Plan B Home Health     DME Needed Upon Discharge  none     Discharge Plan discussed with: Patient     Discharge Barriers Identified None                  Kim Martínez RN, CM   Ext: [phone]

## 2022-06-08 NOTE — SUBJECTIVE & OBJECTIVE
Past Medical History:   Diagnosis Date    Abdominal pain     Allergic rhinitis     Arthritis     Blood platelet disorder     Blood platelet disorder     Blood transfusion     during delivery and     Bowel obstruction     Cervical radiculopathy     followed by dr cloud    Colon cancer     transverse colon; resected; Stage IIA (pT3 pN0 MX)    Diabetes mellitus     Diarrhea     Falls 2020    Family history of breast cancer     Family history of colon cancer     Fatty liver     GERD (gastroesophageal reflux disease)     History of shingles     Hyperlipidemia     Hypothyroidism     Irritable bowel syndrome     Microscopic colitis     treated     Myelodysplastic syndrome     Raynaud phenomenon     Raynaud's disease     Type 2 diabetes mellitus        Past Surgical History:   Procedure Laterality Date    ADENOIDECTOMY      APPENDECTOMY      BACK SURGERY      CARPAL TUNNEL RELEASE      bilateral      SECTION      CHOLECYSTECTOMY  1965    COLECTOMY  2011    Transverse colon resection by Dr. Aguirre    COLONOSCOPY N/A 2017    Procedure: COLONOSCOPY;  Surgeon: Manjit Alvarez MD;  Location: [facility] (4TH FLR);  Service: Endoscopy;  Laterality: N/A;    COLONOSCOPY N/A 2019    Procedure: COLONOSCOPY;  Surgeon: Ramiro Jefferson MD;  Location: [facility] (4TH FLR);  Service: Endoscopy;  Laterality: N/A;  PM prep    COLONOSCOPY N/A 2022    Procedure: COLONOSCOPY;  Surgeon: Ramiro Jefferson MD;  Location: [facility] (2ND FLR);  Service: Endoscopy;  Laterality: N/A;  EGD and colonoscopy in 6-8 weeks from now     states has constipation. -extended Golytely prep    CYSTOSCOPY N/A 2021    Procedure: CYSTOSCOPY;  Surgeon: Viridiana Valenzuela MD;  [facility]  Service: Urology;  Laterality: N/A;    ENDOSCOPIC ULTRASOUND OF UPPER GASTROINTESTINAL TRACT N/A 2018    Procedure: ULTRASOUND, UPPER GI TRACT, ENDOSCOPIC;  Surgeon: Jose Hess MD;  Location: [facility]  ENDO;  Service: Endoscopy;  Laterality: N/A;    ENDOSCOPIC ULTRASOUND OF UPPER GASTROINTESTINAL TRACT N/A 01/23/2019    Procedure: ULTRASOUND, UPPER GI TRACT, ENDOSCOPIC;  Surgeon: Jose Hess MD;  Location: [facility] ENDO (2ND FLR);  Service: Endoscopy;  Laterality: N/A;    ESOPHAGOGASTRODUODENOSCOPY N/A 11/16/2018    Procedure: EGD (ESOPHAGOGASTRODUODENOSCOPY);  Surgeon: Angelo Reynolds MD;  Location: [facility] ENDO (2ND FLR);  Service: Endoscopy;  Laterality: N/A;    ESOPHAGOGASTRODUODENOSCOPY N/A 06/26/2019    Procedure: EGD (ESOPHAGOGASTRODUODENOSCOPY);  Surgeon: Ramiro Jefferson MD;  Location: [facility] ENDO (4TH FLR);  Service: Endoscopy;  Laterality: N/A;    ESOPHAGOGASTRODUODENOSCOPY N/A 5/4/2022    Procedure: EGD (ESOPHAGOGASTRODUODENOSCOPY);  Surgeon: Ramiro Jefferson MD;  Location: [facility] (2ND FLR);  Service: Endoscopy;  Laterality: N/A;  fully vaccinated-GT    EYE SURGERY      Cataract Removal    FLUOROSCOPIC URODYNAMIC STUDY N/A 11/09/2021    Procedure: URODYNAMIC STUDY, FLUOROSCOPIC;  Surgeon: Viridiana Valenzuela MD;  Location: [facility] OR 1ST FLR;  Service: Urology;  Laterality: N/A;  90 minutes     HYSTERECTOMY      JOINT REPLACEMENT      posterolateral fusion with autograft bone and Eun mineralized bone matrix  02/01/2013    at [facility] for lumbar spine stenosis    SMALL INTESTINE SURGERY      SPINE SURGERY      TOE SURGERY      TONSILLECTOMY      TRIGGER FINGER RELEASE         Review of patient's allergies indicates:   Allergen Reactions    Codeine Itching and Nausea And Vomiting    Dilaudid [hydromorphone (bulk)] Other (See Comments)     Oversedating, head burning. Pt prefers to avoid.       Percocet [oxycodone-acetaminophen] Itching    Sulfa (sulfonamide antibiotics) Itching and Nausea And Vomiting           Latex, natural rubber Rash       No current facility-administered medications on file prior to encounter.     Current Outpatient Medications on File Prior to Encounter   Medication Sig     acetaminophen (TYLENOL) 650 MG TbSR Take 650 mg by mouth once as needed (pain).    ascorbic acid, vitamin C, (VITAMIN C) 1000 MG tablet Take 1,000 mg by mouth once daily.    atorvastatin (LIPITOR) 40 MG tablet TAKE 1 TABLET BY MOUTH  DAILY    azelastine (ASTELIN) 137 mcg (0.1 %) nasal spray 2 sprays (274 mcg total) by Nasal route 2 (two) times daily as needed for Rhinitis.    biotin 10,000 mcg TbDL Take 1 tablet by mouth once daily.     calcium-vitamin D3 (OS-KASSANDRA 500 + D3) 500 mg-5 mcg (200 unit) per tablet Take 1,200 tablets by mouth once daily.    cefUROXime (CEFTIN) 500 MG tablet Take 1 tablet (500 mg total) by mouth every 12 (twelve) hours.    co-enzyme Q-10 30 mg capsule Take 30 mg by mouth 3 (three) times daily.    conjugated estrogens (PREMARIN) vaginal cream INSERT 1/2 GRAM VAGINALLY  TWICE WEEKLY    cranberry extract 200 mg Cap Take 1 capsule by mouth once daily.    D-MANNOSE ORAL Take 1 tablet by mouth once daily.     diclofenac sodium (VOLTAREN) 1 % Gel Apply 2 g topically 4 (four) times daily as needed (Back pain).    donepeziL (ARICEPT) 5 MG tablet Take 1 tablet (5 mg total) by mouth every evening.    DULoxetine (CYMBALTA) 30 MG capsule Take 1 capsule (30 mg total) by mouth 2 (two) times daily.    famotidine (PEPCID) 20 MG tablet Take 1 tablet (20 mg total) by mouth 2 (two) times daily as needed for Heartburn.    flash glucose scanning reader (FREESTYLE EFREN 14 DAY READER) Misc Check blood glucose level 3 times daily.    flash glucose sensor (FREESTYLE EFREN 14 DAY SENSOR) Kit 1 application by Misc.(Non-Drug; Combo Route) route every 14 (fourteen) days. Disp 30 or 90 day refill    fluticasone propionate (FLONASE) 50 mcg/actuation nasal spray 1 spray by Each Nostril route daily as needed for Rhinitis.    FREESTYLE LITE STRIPS Strp TEST DAILY AS DIRECTED    gabapentin (NEURONTIN) 600 MG tablet Take 1 tablet (600 mg total) by mouth 2 (two) times daily.    hydrocortisone 2.5 % cream Apply topically 2 (two)  times daily as needed.    hyoscyamine (ANASPAZ,LEVSIN) 0.125 mg Tab TAKE 1 TABLET(125 MCG) BY MOUTH EVERY 8 HOURS AS NEEDED FOR URGENCY    levothyroxine (SYNTHROID) 75 MCG tablet Take 1 tablet (75 mcg total) by mouth before breakfast.    LIDOcaine (LIDODERM) 5 % Place 1 patch onto the skin once daily. Remove & Discard patch within 12 hours or as directed by MD    magnesium 250 mg Tab Take 1,000 mg by mouth once daily.    metFORMIN (GLUCOPHAGE) 500 MG tablet TAKE 1 TABLET(500 MG) BY MOUTH DAILY WITH BREAKFAST    montelukast (SINGULAIR) 10 mg tablet TAKE 1 TABLET BY MOUTH  DAILY    ondansetron (ZOFRAN-ODT) 8 MG TbDL Take 1 tablet (8 mg total) by mouth every 8 (eight) hours as needed (nausea).    pantoprazole (PROTONIX) 20 MG tablet Take 1 tablet (20 mg total) by mouth once daily.    polyethylene glycol (GLYCOLAX) 17 gram/dose powder Take 17 g by mouth before dinner.    vitamin D 1000 units Tab Take 1,000 Units by mouth once daily. D3     Family History       Problem Relation (Age of Onset)    Alcohol abuse Brother, Brother, Brother    Arthritis Daughter, Brother, Brother    Asthma Daughter, Daughter    Bladder Cancer Mother    Breast cancer Sister (79)    Cataracts Mother    Colon cancer Father, Brother (70)    Depression Daughter, Daughter, Daughter    Glaucoma Mother    Heart disease Father (50), Mother    Hyperlipidemia Mother    Hypertension Daughter    Kidney disease Mother    Parkinsonism Brother    Stroke Daughter (40), Daughter          Tobacco Use    Smoking status: Never Smoker    Smokeless tobacco: Never Used   Substance and Sexual Activity    Alcohol use: Not Currently     Alcohol/week: 3.0 standard drinks     Types: 3 Glasses of wine per week     Comment: socially, hardly ever    Drug use: Never    Sexual activity: Not Currently     Review of Systems   Constitutional:  Positive for fatigue. Negative for chills and fever.   HENT:  Negative for sore throat and trouble swallowing.    Eyes:  Negative for  photophobia and visual disturbance.   Respiratory:  Negative for cough, shortness of breath and wheezing.    Cardiovascular:  Negative for chest pain, palpitations and leg swelling.   Gastrointestinal:  Positive for abdominal pain and nausea. Negative for abdominal distention, diarrhea and vomiting.   Genitourinary:  Negative for dysuria and hematuria.   Musculoskeletal:  Negative for neck pain and neck stiffness.   Skin:  Negative for rash and wound.   Neurological:  Negative for syncope, weakness, numbness and headaches.   Psychiatric/Behavioral:  Negative for confusion and decreased concentration.    Objective:     Vital Signs (Most Recent):  Temp: 98 °F (36.7 °C) (06/07/22 2359)  Pulse: 71 (06/07/22 2359)  Resp: 16 (06/07/22 2359)  BP: (!) 98/54 (06/07/22 2359)  SpO2: 98 % (06/07/22 2359)   Vital Signs (24h Range):  Temp:  [97.7 °F (36.5 °C)-98.6 °F (37 °C)] 98 °F (36.7 °C)  Pulse:  [58-89] 71  Resp:  [16-18] 16  SpO2:  [98 %-100 %] 98 %  BP: ()/(54-76) 98/54     Weight: 56.7 kg (125 lb)  Body mass index is 22.86 kg/m².    Physical Exam  Constitutional:       General: She is not in acute distress.     Appearance: She is not toxic-appearing or diaphoretic.   HENT:      Head: Normocephalic and atraumatic.      Nose: Nose normal.   Eyes:      General: No scleral icterus.     Extraocular Movements: Extraocular movements intact.      Pupils: Pupils are equal, round, and reactive to light.   Cardiovascular:      Rate and Rhythm: Normal rate and regular rhythm.   Pulmonary:      Effort: Pulmonary effort is normal. No respiratory distress.      Breath sounds: No wheezing or rales.   Abdominal:      General: Abdomen is flat. There is no distension.      Palpations: Abdomen is soft.      Tenderness: There is abdominal tenderness. There is no guarding.   Musculoskeletal:         General: Normal range of motion.      Cervical back: Normal range of motion and neck supple. No rigidity.      Right lower leg: No edema.       Left lower leg: No edema.   Skin:     General: Skin is warm and dry.      Coloration: Skin is not jaundiced.   Neurological:      General: No focal deficit present.      Mental Status: She is alert and oriented to person, place, and time.      Cranial Nerves: No cranial nerve deficit.   Psychiatric:         Mood and Affect: Mood normal.         Behavior: Behavior normal.         CRANIAL NERVES     CN III, IV, VI   Pupils are equal, round, and reactive to light.     Significant Labs: All pertinent labs within the past 24 hours have been reviewed.  CBC:   Recent Labs   Lab 06/07/22  1439   WBC 6.59   HGB 13.2   HCT 39.3   PLT 89*     CMP:   Recent Labs   Lab 06/07/22  1439   *   K 4.5      CO2 20*   *   BUN 12   CREATININE 0.8   CALCIUM 10.2   PROT 7.5   ALBUMIN 4.1   BILITOT 1.3*   ALKPHOS 70   AST 34   ALT 32   ANIONGAP 10   EGFRNONAA >60.0       Significant Imaging: I have reviewed all pertinent imaging results/findings within the past 24 hours.

## 2022-06-08 NOTE — SUBJECTIVE & OBJECTIVE
Past Medical History:   Diagnosis Date    Abdominal pain     Allergic rhinitis     Arthritis     Blood platelet disorder     Blood platelet disorder     Blood transfusion     during delivery and     Bowel obstruction     Cervical radiculopathy     followed by dr cloud    Colon cancer     transverse colon; resected; Stage IIA (pT3 pN0 MX)    Diabetes mellitus     Diarrhea     Falls 2020    Family history of breast cancer     Family history of colon cancer     Fatty liver     GERD (gastroesophageal reflux disease)     History of shingles     Hyperlipidemia     Hypothyroidism     Irritable bowel syndrome     Microscopic colitis     treated     Myelodysplastic syndrome     Raynaud phenomenon     Raynaud's disease     Type 2 diabetes mellitus        Past Surgical History:   Procedure Laterality Date    ADENOIDECTOMY      APPENDECTOMY      BACK SURGERY      CARPAL TUNNEL RELEASE      bilateral      SECTION      CHOLECYSTECTOMY  1965    COLECTOMY  2011    Transverse colon resection by Dr. Aguirre    COLONOSCOPY N/A 2017    Procedure: COLONOSCOPY;  Surgeon: Manjit Alvarez MD;  Location: [facility] (4TH FLR);  Service: Endoscopy;  Laterality: N/A;    COLONOSCOPY N/A 2019    Procedure: COLONOSCOPY;  Surgeon: Ramiro Jefferson MD;  Location: [facility] (4TH FLR);  Service: Endoscopy;  Laterality: N/A;  PM prep    COLONOSCOPY N/A 2022    Procedure: COLONOSCOPY;  Surgeon: Ramiro Jefferson MD;  Location: [facility] (2ND FLR);  Service: Endoscopy;  Laterality: N/A;  EGD and colonoscopy in 6-8 weeks from now     states has constipation. -extended Golytely prep    CYSTOSCOPY N/A 2021    Procedure: CYSTOSCOPY;  Surgeon: Viridiana Valenzuela MD;  [facility]  Service: Urology;  Laterality: N/A;    ENDOSCOPIC ULTRASOUND OF UPPER GASTROINTESTINAL TRACT N/A 2018    Procedure: ULTRASOUND, UPPER GI TRACT, ENDOSCOPIC;  Surgeon: Jose Hess MD;  Location: [facility]  ENDO;  Service: Endoscopy;  Laterality: N/A;    ENDOSCOPIC ULTRASOUND OF UPPER GASTROINTESTINAL TRACT N/A 01/23/2019    Procedure: ULTRASOUND, UPPER GI TRACT, ENDOSCOPIC;  Surgeon: Jose Hess MD;  Location: [facility] ENDO (2ND FLR);  Service: Endoscopy;  Laterality: N/A;    ESOPHAGOGASTRODUODENOSCOPY N/A 11/16/2018    Procedure: EGD (ESOPHAGOGASTRODUODENOSCOPY);  Surgeon: Angelo Reynolds MD;  Location: [facility] ENDO (2ND FLR);  Service: Endoscopy;  Laterality: N/A;    ESOPHAGOGASTRODUODENOSCOPY N/A 06/26/2019    Procedure: EGD (ESOPHAGOGASTRODUODENOSCOPY);  Surgeon: Ramiro Jefferson MD;  Location: [facility] ENDO (4TH FLR);  Service: Endoscopy;  Laterality: N/A;    ESOPHAGOGASTRODUODENOSCOPY N/A 5/4/2022    Procedure: EGD (ESOPHAGOGASTRODUODENOSCOPY);  Surgeon: Ramiro Jefferson MD;  Location: [facility] (2ND FLR);  Service: Endoscopy;  Laterality: N/A;  fully vaccinated-GT    EYE SURGERY      Cataract Removal    FLUOROSCOPIC URODYNAMIC STUDY N/A 11/09/2021    Procedure: URODYNAMIC STUDY, FLUOROSCOPIC;  Surgeon: Viridiana Valenzuela MD;  Location: [facility] OR 1ST FLR;  Service: Urology;  Laterality: N/A;  90 minutes     HYSTERECTOMY      JOINT REPLACEMENT      posterolateral fusion with autograft bone and Eun mineralized bone matrix  02/01/2013    at [facility] for lumbar spine stenosis    SMALL INTESTINE SURGERY      SPINE SURGERY      TOE SURGERY      TONSILLECTOMY      TRIGGER FINGER RELEASE         Review of patient's allergies indicates:   Allergen Reactions    Codeine Itching and Nausea And Vomiting    Dilaudid [hydromorphone (bulk)] Other (See Comments)     Oversedating, head burning. Pt prefers to avoid.       Percocet [oxycodone-acetaminophen] Itching    Sulfa (sulfonamide antibiotics) Itching and Nausea And Vomiting           Latex, natural rubber Rash     Family History       Problem Relation (Age of Onset)    Alcohol abuse Brother, Brother, Brother    Arthritis Daughter, Brother, Brother    Asthma Daughter, Daughter     Bladder Cancer Mother    Breast cancer Sister (79)    Cataracts Mother    Colon cancer Father, Brother (70)    Depression Daughter, Daughter, Daughter    Glaucoma Mother    Heart disease Father (50), Mother    Hyperlipidemia Mother    Hypertension Daughter    Kidney disease Mother    Parkinsonism Brother    Stroke Daughter (40), Daughter          Tobacco Use    Smoking status: Never Smoker    Smokeless tobacco: Never Used   Substance and Sexual Activity    Alcohol use: Not Currently     Alcohol/week: 3.0 standard drinks     Types: 3 Glasses of wine per week     Comment: socially, hardly ever    Drug use: Never    Sexual activity: Not Currently     Review of Systems   Constitutional:  Positive for fatigue. Negative for appetite change, chills and fever.   HENT:  Negative for congestion and trouble swallowing.    Eyes:  Negative for pain and redness.   Respiratory:  Negative for cough and shortness of breath.    Cardiovascular:  Negative for chest pain and palpitations.   Gastrointestinal:  Positive for abdominal pain and diarrhea. Negative for blood in stool, constipation, nausea, rectal pain and vomiting.   Genitourinary:  Negative for difficulty urinating and dysuria.   Musculoskeletal:  Negative for back pain and neck pain.   Skin:  Negative for rash.   Neurological:  Positive for tremors and weakness. Negative for dizziness, light-headedness and headaches.   Objective:     Vital Signs (Most Recent):  Temp: 97.9 °F (36.6 °C) (06/08/22 1144)  Pulse: 63 (06/08/22 1144)  Resp: 16 (06/08/22 1144)  BP: (!) 95/51 (06/08/22 1144)  SpO2: 97 % (06/08/22 1144)   Vital Signs (24h Range):  Temp:  [97.7 °F (36.5 °C)-99 °F (37.2 °C)] 97.9 °F (36.6 °C)  Pulse:  [58-89] 63  Resp:  [16-18] 16  SpO2:  [97 %-100 %] 97 %  BP: ()/(51-76) 95/51     Weight: 56.7 kg (125 lb) (06/07/22 2300)  Body mass index is 22.86 kg/m².      Intake/Output Summary (Last 24 hours) at 6/8/2022 1222  Last data filed at 6/7/2022 1724  Gross per 24  hour   Intake 1000 ml   Output --   Net 1000 ml       Lines/Drains/Airways       Peripheral Intravenous Line  Duration                  Peripheral IV - Single Lumen 06/07/22 1437 24 G Right Hand <1 day                    Physical Exam  Constitutional:       Appearance: Normal appearance. She is not ill-appearing.   Eyes:      General: No scleral icterus.     Conjunctiva/sclera: Conjunctivae normal.   Cardiovascular:      Rate and Rhythm: Normal rate and regular rhythm.      Pulses: Normal pulses.      Heart sounds: Normal heart sounds.   Pulmonary:      Effort: Pulmonary effort is normal. No respiratory distress.      Breath sounds: Normal breath sounds.   Abdominal:      General: Bowel sounds are normal. There is no distension.      Palpations: Abdomen is soft.      Tenderness: There is no abdominal tenderness.      Comments: Well-healed surgical scar in abdominal midline.    Skin:     General: Skin is warm and dry.      Findings: No bruising or rash.   Neurological:      Mental Status: She is alert and oriented to person, place, and time.       Significant Labs:  All pertinent lab results from the last 24 hours have been reviewed.    Significant Imaging:  Imaging results within the past 24 hours have been reviewed.

## 2022-06-09 PROBLEM — G89.29 CHRONIC LOW BACK PAIN: Status: ACTIVE | Noted: 2022-06-09

## 2022-06-09 PROBLEM — M54.50 CHRONIC LOW BACK PAIN: Status: ACTIVE | Noted: 2022-06-09

## 2022-06-09 LAB
POCT GLUCOSE: 111 MG/DL (ref 70–110)
POCT GLUCOSE: 121 MG/DL (ref 70–110)
POCT GLUCOSE: 124 MG/DL (ref 70–110)
POCT GLUCOSE: 165 MG/DL (ref 70–110)
POCT GLUCOSE: 206 MG/DL (ref 70–110)
WBC #/AREA STL HPF: NORMAL /[HPF]

## 2022-06-09 PROCEDURE — C1751 CATH, INF, PER/CENT/MIDLINE: HCPCS

## 2022-06-09 PROCEDURE — 99233 PR SUBSEQUENT HOSPITAL CARE,LEVL III: ICD-10-PCS | Mod: ,,, | Performed by: HOSPITALIST

## 2022-06-09 PROCEDURE — 63600175 PHARM REV CODE 636 W HCPCS: Performed by: FAMILY MEDICINE

## 2022-06-09 PROCEDURE — 99233 SBSQ HOSP IP/OBS HIGH 50: CPT | Mod: ,,, | Performed by: HOSPITALIST

## 2022-06-09 PROCEDURE — 36410 VNPNXR 3YR/> PHY/QHP DX/THER: CPT

## 2022-06-09 PROCEDURE — 25000003 PHARM REV CODE 250: Performed by: HOSPITALIST

## 2022-06-09 PROCEDURE — 25000003 PHARM REV CODE 250: Performed by: FAMILY MEDICINE

## 2022-06-09 PROCEDURE — 76937 US GUIDE VASCULAR ACCESS: CPT

## 2022-06-09 PROCEDURE — 94760 N-INVAS EAR/PLS OXIMETRY 1: CPT

## 2022-06-09 PROCEDURE — 20600001 HC STEP DOWN PRIVATE ROOM

## 2022-06-09 RX ORDER — GABAPENTIN 100 MG/1
100 CAPSULE ORAL 2 TIMES DAILY
Status: DISCONTINUED | OUTPATIENT
Start: 2022-06-09 | End: 2022-06-11 | Stop reason: HOSPADM

## 2022-06-09 RX ORDER — ACETAMINOPHEN 325 MG/1
650 TABLET ORAL EVERY 6 HOURS PRN
Status: CANCELLED | OUTPATIENT
Start: 2022-06-09

## 2022-06-09 RX ORDER — CHOLESTYRAMINE 4 G/9G
1 POWDER, FOR SUSPENSION ORAL 2 TIMES DAILY
Status: DISCONTINUED | OUTPATIENT
Start: 2022-06-09 | End: 2022-06-11 | Stop reason: HOSPADM

## 2022-06-09 RX ORDER — ACETAMINOPHEN 325 MG/1
650 TABLET ORAL EVERY 8 HOURS PRN
Status: DISCONTINUED | OUTPATIENT
Start: 2022-06-09 | End: 2022-06-11 | Stop reason: HOSPADM

## 2022-06-09 RX ORDER — ACETAMINOPHEN 325 MG/1
650 TABLET ORAL EVERY 8 HOURS PRN
Status: DISCONTINUED | OUTPATIENT
Start: 2022-06-09 | End: 2022-06-09

## 2022-06-09 RX ORDER — IBUPROFEN 200 MG
200 TABLET ORAL EVERY 6 HOURS PRN
Status: DISCONTINUED | OUTPATIENT
Start: 2022-06-09 | End: 2022-06-11 | Stop reason: HOSPADM

## 2022-06-09 RX ADMIN — IBUPROFEN 200 MG: 200 TABLET, FILM COATED ORAL at 12:06

## 2022-06-09 RX ADMIN — DONEPEZIL HYDROCHLORIDE 5 MG: 5 TABLET, FILM COATED ORAL at 08:06

## 2022-06-09 RX ADMIN — SODIUM CHLORIDE, SODIUM LACTATE, POTASSIUM CHLORIDE, AND CALCIUM CHLORIDE: .6; .31; .03; .02 INJECTION, SOLUTION INTRAVENOUS at 05:06

## 2022-06-09 RX ADMIN — PANTOPRAZOLE SODIUM 20 MG: 20 TABLET, DELAYED RELEASE ORAL at 10:06

## 2022-06-09 RX ADMIN — ACETAMINOPHEN 650 MG: 325 TABLET ORAL at 01:06

## 2022-06-09 RX ADMIN — GABAPENTIN 100 MG: 100 CAPSULE ORAL at 08:06

## 2022-06-09 RX ADMIN — ENOXAPARIN SODIUM 40 MG: 100 INJECTION SUBCUTANEOUS at 05:06

## 2022-06-09 RX ADMIN — LEVOTHYROXINE SODIUM 75 MCG: 75 TABLET ORAL at 05:06

## 2022-06-09 RX ADMIN — MONTELUKAST 10 MG: 10 TABLET, FILM COATED ORAL at 10:06

## 2022-06-09 RX ADMIN — DULOXETINE 30 MG: 30 CAPSULE, DELAYED RELEASE ORAL at 10:06

## 2022-06-09 RX ADMIN — CHOLESTYRAMINE 4 G: 4 POWDER, FOR SUSPENSION ORAL at 08:06

## 2022-06-09 RX ADMIN — Medication 6 MG: at 08:06

## 2022-06-09 RX ADMIN — DULOXETINE 30 MG: 30 CAPSULE, DELAYED RELEASE ORAL at 08:06

## 2022-06-09 RX ADMIN — ATORVASTATIN CALCIUM 40 MG: 20 TABLET, FILM COATED ORAL at 10:06

## 2022-06-09 NOTE — HOSPITAL COURSE
6/9- appreciate GI consult. C diff neg, stool cult in process, lab - ok. Bili 1.6. /52 Pulse 70  AF. 4 urinations, 4 stools  6/10- 3 stools yesterday, questran added, stool cult - still pending, Pt started to sip boost and minimal oral intake.  Advance to full liquid diet.  Eating better. BM w less frequency. Pt is happy. DTR with her in room.  6/11- eating 50 % of reg diet, and 3 BMs. Abd pain improved. Will dc to home.

## 2022-06-09 NOTE — ASSESSMENT & PLAN NOTE
Questran started t bid, can increase to qid   May also start  immodium  Stool cult neg  c diff neg

## 2022-06-09 NOTE — NURSING
Nurse paged team x2 about the pt's c/o of pain, Currently pain med not available under MAR (pt has tylenol but can only receive for elevated temp). Awaiting callback. Charge nurse notified.

## 2022-06-09 NOTE — ASSESSMENT & PLAN NOTE
Due to Adhesions  - on tylenol and zofran  - trying to avoid bentyl, opioids in that they can ppt SBO and slow motility

## 2022-06-09 NOTE — SUBJECTIVE & OBJECTIVE
Interval History: see above    Review of Systems   Constitutional:  Negative for activity change, chills, fatigue and fever.   HENT:  Negative for congestion, ear pain, facial swelling, nosebleeds and sore throat.    Eyes:  Negative for pain.   Respiratory:  Negative for apnea, cough, chest tightness and shortness of breath.    Cardiovascular:  Negative for chest pain.   Gastrointestinal:  Negative for abdominal distention, abdominal pain, anal bleeding, constipation, diarrhea, nausea and vomiting.   Genitourinary:  Negative for difficulty urinating, dysuria and frequency.   Musculoskeletal:  Negative for arthralgias, back pain, joint swelling and neck pain.   Skin:  Negative for rash and wound.   Neurological:  Negative for dizziness, seizures, syncope, light-headedness, numbness and headaches.   Psychiatric/Behavioral:  Negative for agitation, behavioral problems, confusion, hallucinations, self-injury and sleep disturbance. The patient is not nervous/anxious.    Objective:     Vital Signs (Most Recent):  Temp: 97.2 °F (36.2 °C) (06/09/22 0719)  Pulse: 70 (06/09/22 0719)  Resp: 17 (06/09/22 0719)  BP: (!) 101/52 (06/09/22 0719)  SpO2: 95 % (06/09/22 0719)   Vital Signs (24h Range):  Temp:  [96.5 °F (35.8 °C)-97.9 °F (36.6 °C)] 97.2 °F (36.2 °C)  Pulse:  [59-79] 70  Resp:  [16-17] 17  SpO2:  [94 %-99 %] 95 %  BP: ()/(47-58) 101/52     Weight: 56.7 kg (125 lb)  Body mass index is 22.86 kg/m².    Intake/Output Summary (Last 24 hours) at 6/9/2022 0748  Last data filed at 6/9/2022 0600  Gross per 24 hour   Intake 1529.42 ml   Output --   Net 1529.42 ml      Physical Exam  Constitutional:       General: She is not in acute distress.     Appearance: Normal appearance. She is ill-appearing. She is not toxic-appearing.      Comments: Frail, elderly   HENT:      Head: Normocephalic and atraumatic.      Nose: Nose normal.      Mouth/Throat:      Mouth: Mucous membranes are moist.   Eyes:      General: No scleral  icterus.     Extraocular Movements: Extraocular movements intact.      Pupils: Pupils are equal, round, and reactive to light.   Cardiovascular:      Rate and Rhythm: Normal rate and regular rhythm.      Pulses: Normal pulses.      Heart sounds: Normal heart sounds.   Pulmonary:      Effort: Pulmonary effort is normal.      Breath sounds: Normal breath sounds. No wheezing or rhonchi.   Chest:      Chest wall: No tenderness.   Abdominal:      General: Abdomen is flat. Bowel sounds are normal. There is no distension.      Palpations: Abdomen is soft. There is no mass.      Tenderness: There is no abdominal tenderness. There is no right CVA tenderness, left CVA tenderness, guarding or rebound.      Hernia: No hernia is present.   Musculoskeletal:         General: No swelling, tenderness, deformity or signs of injury. Normal range of motion.      Cervical back: Normal range of motion and neck supple. No rigidity or tenderness.   Skin:     General: Skin is warm and dry.      Coloration: Skin is not jaundiced.      Findings: No bruising or erythema.   Neurological:      General: No focal deficit present.      Mental Status: She is alert and oriented to person, place, and time. Mental status is at baseline.      Cranial Nerves: No cranial nerve deficit.      Motor: No weakness.   Psychiatric:         Mood and Affect: Mood normal.         Behavior: Behavior normal.         Thought Content: Thought content normal.         Judgment: Judgment normal.       Significant Labs: All pertinent labs within the past 24 hours have been reviewed.  CBC:   Recent Labs   Lab 06/07/22  1439 06/08/22  0822   WBC 6.59 4.26   HGB 13.2 12.6   HCT 39.3 39.1   PLT 89* 77*     CMP:   Recent Labs   Lab 06/07/22  1439 06/08/22  0822   * 141   K 4.5 3.8    105   CO2 20* 27   * 120*   BUN 12 9   CREATININE 0.8 0.7   CALCIUM 10.2 9.7   PROT 7.5 6.7   ALBUMIN 4.1 3.9   BILITOT 1.3* 1.6*   ALKPHOS 70 77   AST 34 36   ALT 32 34    ANIONGAP 10 9   EGFRNONAA >60.0 >60.0       Significant Imaging: I have reviewed all pertinent imaging results/findings within the past 24 hours.

## 2022-06-09 NOTE — CONSULTS
Placed 20 g x 8 cm midline in Left basilic vein using realtime ultrasound guidance.  Lot#[id number].  Max dwell date 7/8/22.  Imagery recorded and saved.

## 2022-06-09 NOTE — ASSESSMENT & PLAN NOTE
Recent Labs     06/08/22  0539 06/08/22  1805 06/08/22  2340 06/09/22  0730   POCTGLUCOSE 125* 101 99 124*     Not on meds

## 2022-06-09 NOTE — PLAN OF CARE
POC reviewed with pt, verbalized understanding. VSS on RA. AAOX4. Remains free of falls and injury. Ambulated in room.    IVF.  Glucose checks q 6 hrs. Pt denies chest pain and SOB.S CDs in place. No acute events or distress noted. Bed in lowest position, call light in reach, frequent rounds made for safety.    Wctm.        Problem: Adult Inpatient Plan of Care  Goal: Optimal Comfort and Wellbeing  Outcome: Ongoing, Progressing  Goal: Readiness for Transition of Care  Outcome: Ongoing, Progressing     Problem: Adult Inpatient Plan of Care  Goal: Plan of Care Review  Outcome: Met  Goal: Patient-Specific Goal (Individualized)  Outcome: Met  Goal: Absence of Hospital-Acquired Illness or Injury  Outcome: Met

## 2022-06-09 NOTE — ASSESSMENT & PLAN NOTE
77 y.o. femlae w hx oc colon ca, adhesions and multiple admissions for SBO in the past, present w and pain and daihhrea, s/p GI and surger consults. C diff neg, stool cult pending, may start questan or immodium if needed.     - CT Abdomen: Distended loops of small bowel in the right hemiabdomen with decompressed loops and soft tissue thickening near the midline.  Overall configuration the distended bowel loops appears similar to prior with slightly lesser degree of distension.  Findings may reflect recurrent partial small bowel obstruction.  Possible small bowel transition point near the probable adhesive change in the right mid abdomen anteriorly.   - patient is passing gas and last BM today 06/08  ;  Currently with nausea without vomiting  - General surgery consulted and evaluated patient  ;  Appreciate recs  - No acute surgical intervention warranted   - clear liquids, PRN pain management trying to minimize narcotics  - inpatient GI consult for optimization of bowel regimen, GI motility, and dietary modifications that may assist in her symptomatology.  - LR 50ml/hr continuous  - if patient develops vomiting would consider NGT placement  - further management pending clinical course and future study review    6/9- Per GI recs  C diff neg  -okay to intiate scheduled anti-motility agents with IMODIUM TID or QUESTRAN 4 g QID with instructions to stop medication when bowel movement frequency decreases to less than 3/day.   -Would avoid BENTYL given history of recurrent SBO.   -Okay for gluten free diet from GI standpoint.   -No plans for endoscopic intervention given recent EGD and colonoscopy in 05/2022 that were normal.   6/9- on clear liquid diet, 4 stools. Midline placed. Boost. Start questran bid to slow bowl motility. Goal is 3 BMs daily or less, may add immodium later if needed

## 2022-06-09 NOTE — PLAN OF CARE
Problem: Adult Inpatient Plan of Care  Goal: Plan of Care Review  Outcome: Ongoing, Progressing  Flowsheets (Taken 6/9/2022 0102)  Plan of Care Reviewed With: patient  Goal: Patient-Specific Goal (Individualized)  Outcome: Ongoing, Progressing  Goal: Absence of Hospital-Acquired Illness or Injury  Outcome: Ongoing, Progressing  Intervention: Identify and Manage Fall Risk  Flowsheets (Taken 6/9/2022 0102)  Safety Promotion/Fall Prevention:   assistive device/personal item within reach   side rails raised x 3   lighting adjusted  Intervention: Prevent Skin Injury  Flowsheets (Taken 6/9/2022 0102)  Body Position: 30 degrees  Skin Protection: adhesive use limited  Intervention: Prevent and Manage VTE (Venous Thromboembolism) Risk  Flowsheets (Taken 6/9/2022 0102)  Activity Management: Ambulated to bathroom - L4  VTE Prevention/Management: remove, assess skin, and reapply sequential compression device  Range of Motion: active ROM (range of motion) encouraged  Intervention: Prevent Infection  Flowsheets (Taken 6/9/2022 0102)  Infection Prevention:   hand hygiene promoted   rest/sleep promoted  Goal: Optimal Comfort and Wellbeing  Outcome: Ongoing, Progressing  Intervention: Monitor Pain and Promote Comfort  Flowsheets (Taken 6/9/2022 0102)  Pain Management Interventions:   quiet environment facilitated   relaxation techniques promoted   care clustered  Intervention: Provide Person-Centered Care  Flowsheets (Taken 6/9/2022 0102)  Trust Relationship/Rapport:   care explained   questions answered   choices provided  Goal: Readiness for Transition of Care  Outcome: Ongoing, Progressing     Problem: Diabetes Comorbidity  Goal: Blood Glucose Level Within Targeted Range  Outcome: Ongoing, Progressing  Intervention: Monitor and Manage Glycemia  Flowsheets (Taken 6/9/2022 0102)  Glycemic Management: blood glucose monitored     Problem: Infection  Goal: Absence of Infection Signs and Symptoms  Outcome: Ongoing,  Progressing  Intervention: Prevent or Manage Infection  Flowsheets (Taken 6/9/2022 0102)  Fever Reduction/Comfort Measures: lightweight clothing  Infection Management: aseptic technique maintained

## 2022-06-09 NOTE — PROGRESS NOTES
[facility]  Progress Note    Patient Name: Anayeli Judd  MRN: [id number]  Patient Class: IP- Inpatient   Admission Date: 6/7/2022  Length of Stay: 2 days  Attending Physician: Marianna Cho MD  Primary Care Provider: Darci Guthrie MD        Subjective:     Principal Problem:Partial small bowel obstruction        HPI:  Anayeli Judd is a 77 y.o. 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 She underwent ex-lap and attempted TIFFANY 3 months ago but the procedure was aborted due to dense adhesions. She was admitted most recently on 5/28 for concerns related to recurrent pSBO. At that time she was admitted to obs and had quick return of bowel function. She presents today with 1 day history of abdominal pain, nausea and increase bowel movements. Reports abdominal cramping started this morning. Pain has improved significantly since she has been in the ED. Distention has resolved. She had one episode of nausea this am, no reported emesis. She has had an increase in loose, non-bloody bowel movements today. She normally has 3-4 bowel movements per day, today she has had 7-8, per report from her care taker.      In ED she is HDS. Lab works relatively unremarkable.   CT scan similar to previous with distended loops of small bowel. Noted to have enlarge, distended bladder on CT.   General Surgery consulted and evaluated patient. Felt no surgical indication necessary at this time and recommended admission to  with GI consult. Patient admitted to the care of medicine for further evaluation and management.      Overview/Hospital Course:  6/9- appreciate GI consult. C diff neg, stool cult in process, lab - ok. Bili 1.6. /52 Pulse 70  AF. 4 urinations, 4 stools        Interval History: see above    Review of Systems   Constitutional:  Negative for activity change, chills, fatigue and fever.   HENT:  Negative for congestion, ear  pain, facial swelling, nosebleeds and sore throat.    Eyes:  Negative for pain.   Respiratory:  Negative for apnea, cough, chest tightness and shortness of breath.    Cardiovascular:  Negative for chest pain.   Gastrointestinal:  Negative for abdominal distention, abdominal pain, anal bleeding, constipation, diarrhea, nausea and vomiting.   Genitourinary:  Negative for difficulty urinating, dysuria and frequency.   Musculoskeletal:  Negative for arthralgias, back pain, joint swelling and neck pain.   Skin:  Negative for rash and wound.   Neurological:  Negative for dizziness, seizures, syncope, light-headedness, numbness and headaches.   Psychiatric/Behavioral:  Negative for agitation, behavioral problems, confusion, hallucinations, self-injury and sleep disturbance. The patient is not nervous/anxious.    Objective:     Vital Signs (Most Recent):  Temp: 97.2 °F (36.2 °C) (06/09/22 0719)  Pulse: 70 (06/09/22 0719)  Resp: 17 (06/09/22 0719)  BP: (!) 101/52 (06/09/22 0719)  SpO2: 95 % (06/09/22 0719)   Vital Signs (24h Range):  Temp:  [96.5 °F (35.8 °C)-97.9 °F (36.6 °C)] 97.2 °F (36.2 °C)  Pulse:  [59-79] 70  Resp:  [16-17] 17  SpO2:  [94 %-99 %] 95 %  BP: ()/(47-58) 101/52     Weight: 56.7 kg (125 lb)  Body mass index is 22.86 kg/m².    Intake/Output Summary (Last 24 hours) at 6/9/2022 0748  Last data filed at 6/9/2022 0600  Gross per 24 hour   Intake 1529.42 ml   Output --   Net 1529.42 ml      Physical Exam  Constitutional:       General: She is not in acute distress.     Appearance: Normal appearance. She is ill-appearing. She is not toxic-appearing.      Comments: Frail, elderly   HENT:      Head: Normocephalic and atraumatic.      Nose: Nose normal.      Mouth/Throat:      Mouth: Mucous membranes are moist.   Eyes:      General: No scleral icterus.     Extraocular Movements: Extraocular movements intact.      Pupils: Pupils are equal, round, and reactive to light.   Cardiovascular:      Rate and Rhythm:  Normal rate and regular rhythm.      Pulses: Normal pulses.      Heart sounds: Normal heart sounds.   Pulmonary:      Effort: Pulmonary effort is normal.      Breath sounds: Normal breath sounds. No wheezing or rhonchi.   Chest:      Chest wall: No tenderness.   Abdominal:      General: Abdomen is flat. Bowel sounds are normal. There is no distension.      Palpations: Abdomen is soft. There is no mass.      Tenderness: There is no abdominal tenderness. There is no right CVA tenderness, left CVA tenderness, guarding or rebound.      Hernia: No hernia is present.   Musculoskeletal:         General: No swelling, tenderness, deformity or signs of injury. Normal range of motion.      Cervical back: Normal range of motion and neck supple. No rigidity or tenderness.   Skin:     General: Skin is warm and dry.      Coloration: Skin is not jaundiced.      Findings: No bruising or erythema.   Neurological:      General: No focal deficit present.      Mental Status: She is alert and oriented to person, place, and time. Mental status is at baseline.      Cranial Nerves: No cranial nerve deficit.      Motor: No weakness.   Psychiatric:         Mood and Affect: Mood normal.         Behavior: Behavior normal.         Thought Content: Thought content normal.         Judgment: Judgment normal.       Significant Labs: All pertinent labs within the past 24 hours have been reviewed.  CBC:   Recent Labs   Lab 06/07/22  1439 06/08/22  0822   WBC 6.59 4.26   HGB 13.2 12.6   HCT 39.3 39.1   PLT 89* 77*     CMP:   Recent Labs   Lab 06/07/22  1439 06/08/22  0822   * 141   K 4.5 3.8    105   CO2 20* 27   * 120*   BUN 12 9   CREATININE 0.8 0.7   CALCIUM 10.2 9.7   PROT 7.5 6.7   ALBUMIN 4.1 3.9   BILITOT 1.3* 1.6*   ALKPHOS 70 77   AST 34 36   ALT 32 34   ANIONGAP 10 9   EGFRNONAA >60.0 >60.0       Significant Imaging: I have reviewed all pertinent imaging results/findings within the past 24 hours.      Assessment/Plan:       * Partial small bowel obstruction  77 y.o. femlae w hx oc colon ca, adhesions and multiple admissions for SBO in the past, present w and pain and daihhrea, s/p GI and surger consults. C diff neg, stool cult pending, may start questan or immodium if needed.     - CT Abdomen: Distended loops of small bowel in the right hemiabdomen with decompressed loops and soft tissue thickening near the midline.  Overall configuration the distended bowel loops appears similar to prior with slightly lesser degree of distension.  Findings may reflect recurrent partial small bowel obstruction.  Possible small bowel transition point near the probable adhesive change in the right mid abdomen anteriorly.   - patient is passing gas and last BM today 06/08  ;  Currently with nausea without vomiting  - General surgery consulted and evaluated patient  ;  Appreciate recs  - No acute surgical intervention warranted   - clear liquids, PRN pain management trying to minimize narcotics  - inpatient GI consult for optimization of bowel regimen, GI motility, and dietary modifications that may assist in her symptomatology.  - LR 50ml/hr continuous  - if patient develops vomiting would consider NGT placement  - further management pending clinical course and future study review    6/9- Per GI recs  C diff neg  -okay to intiate scheduled anti-motility agents with IMODIUM TID or QUESTRAN 4 g QID with instructions to stop medication when bowel movement frequency decreases to less than 3/day.   -Would avoid BENTYL given history of recurrent SBO.   -Okay for gluten free diet from GI standpoint.   -No plans for endoscopic intervention given recent EGD and colonoscopy in 05/2022 that were normal.   6/9- on clear liquid diet, 4 stools. Midline placed. Boost. Start questran bid to slow bowl motility. Goal is 3 BMs daily or less, may add immodium later if needed    Abdominal discomfort  Due to Adhesions  - on tylenol and zofran  - trying to avoid bentyl,  opioids in that they can ppt SBO and slow motility        Chronic low back pain  Resume gabapentin 100 bid    Adhesion of abdominal wall  Chronic  S/p laproscopic surgeries      Type 2 diabetes mellitus with diabetic peripheral angiopathy without gangrene, without long-term current use of insulin  Recent Labs     06/08/22  0539 06/08/22  1805 06/08/22  2340 06/09/22  0730   POCTGLUCOSE 125* 101 99 124*     Not on meds      Chronic diarrhea  Questran started t bid, can increase to qid   May also start  immodium  Stool cult neg  c diff neg      History of colon cancer  Remote  Completed treatments  S/p partial colectomy      Other hyperlipidemia  - home statin      Memory change  - home aricept      Gait instability  Fall precautions    Hypothyroid  - home synthroid      VTE Risk Mitigation (From admission, onward)         Ordered     enoxaparin injection 40 mg  Daily         06/07/22 2012     Place sequential compression device  Until discontinued         06/07/22 2012                Discharge Planning   LOI: 6/10/2022     Code Status: Full Code   Is the patient medically ready for discharge?: No    Reason for patient still in hospital (select all that apply): Patient trending condition  Discharge Plan A: Home        Marianna Cho MD  Department of Hospital Medicine   [facility]  [place]

## 2022-06-10 LAB
POCT GLUCOSE: 116 MG/DL (ref 70–110)
POCT GLUCOSE: 117 MG/DL (ref 70–110)
POCT GLUCOSE: 131 MG/DL (ref 70–110)

## 2022-06-10 PROCEDURE — 99233 PR SUBSEQUENT HOSPITAL CARE,LEVL III: ICD-10-PCS | Mod: ,,, | Performed by: HOSPITALIST

## 2022-06-10 PROCEDURE — 99233 SBSQ HOSP IP/OBS HIGH 50: CPT | Mod: ,,, | Performed by: HOSPITALIST

## 2022-06-10 PROCEDURE — 25000003 PHARM REV CODE 250: Performed by: HOSPITALIST

## 2022-06-10 PROCEDURE — 63600175 PHARM REV CODE 636 W HCPCS: Performed by: FAMILY MEDICINE

## 2022-06-10 PROCEDURE — 25000003 PHARM REV CODE 250: Performed by: FAMILY MEDICINE

## 2022-06-10 PROCEDURE — 20600001 HC STEP DOWN PRIVATE ROOM

## 2022-06-10 RX ADMIN — IBUPROFEN 200 MG: 200 TABLET, FILM COATED ORAL at 10:06

## 2022-06-10 RX ADMIN — ACETAMINOPHEN 650 MG: 325 TABLET ORAL at 10:06

## 2022-06-10 RX ADMIN — CHOLESTYRAMINE 4 G: 4 POWDER, FOR SUSPENSION ORAL at 08:06

## 2022-06-10 RX ADMIN — LEVOTHYROXINE SODIUM 75 MCG: 75 TABLET ORAL at 05:06

## 2022-06-10 RX ADMIN — ATORVASTATIN CALCIUM 40 MG: 20 TABLET, FILM COATED ORAL at 09:06

## 2022-06-10 RX ADMIN — PANTOPRAZOLE SODIUM 20 MG: 20 TABLET, DELAYED RELEASE ORAL at 09:06

## 2022-06-10 RX ADMIN — CHOLESTYRAMINE 4 G: 4 POWDER, FOR SUSPENSION ORAL at 09:06

## 2022-06-10 RX ADMIN — ENOXAPARIN SODIUM 40 MG: 100 INJECTION SUBCUTANEOUS at 04:06

## 2022-06-10 RX ADMIN — Medication 6 MG: at 08:06

## 2022-06-10 RX ADMIN — GABAPENTIN 100 MG: 100 CAPSULE ORAL at 08:06

## 2022-06-10 RX ADMIN — SODIUM CHLORIDE, SODIUM LACTATE, POTASSIUM CHLORIDE, AND CALCIUM CHLORIDE: .6; .31; .03; .02 INJECTION, SOLUTION INTRAVENOUS at 11:06

## 2022-06-10 RX ADMIN — DULOXETINE 30 MG: 30 CAPSULE, DELAYED RELEASE ORAL at 09:06

## 2022-06-10 RX ADMIN — DONEPEZIL HYDROCHLORIDE 5 MG: 5 TABLET, FILM COATED ORAL at 08:06

## 2022-06-10 RX ADMIN — GABAPENTIN 100 MG: 100 CAPSULE ORAL at 09:06

## 2022-06-10 RX ADMIN — DULOXETINE 30 MG: 30 CAPSULE, DELAYED RELEASE ORAL at 08:06

## 2022-06-10 RX ADMIN — ACETAMINOPHEN 650 MG: 325 TABLET ORAL at 06:06

## 2022-06-10 RX ADMIN — MONTELUKAST 10 MG: 10 TABLET, FILM COATED ORAL at 09:06

## 2022-06-10 RX ADMIN — IBUPROFEN 200 MG: 200 TABLET, FILM COATED ORAL at 06:06

## 2022-06-10 NOTE — PLAN OF CARE
POC reviewed with patient and caregiver, understanding verbalized. Occasionally anxious and refusing some of her meds but she is easily reorientable and agrees. Old ML incision healing. LR infusing @ 50 through ML. Up to RR x1 with caregiver assist for 1 BM. Denies pain. Q6 POCT done, no coverage needed tonight. Pt ANOx4, VSS on RA. Pt able to make needs known and remains free of fall or injury as safety measures intact. Will continue to monitor.        Problem: Diabetes Comorbidity  Goal: Blood Glucose Level Within Targeted Range  Outcome: Ongoing, Progressing     Problem: Infection  Goal: Absence of Infection Signs and Symptoms  Outcome: Ongoing, Progressing     Problem: Adult Inpatient Plan of Care  Goal: Optimal Comfort and Wellbeing  Outcome: Ongoing, Progressing     Problem: Adult Inpatient Plan of Care  Goal: Readiness for Transition of Care  Outcome: Ongoing, Progressing

## 2022-06-10 NOTE — ASSESSMENT & PLAN NOTE
Due to Adhesions  - on tylenol and zofran  - trying to avoid bentyl, opioids in that they can ppt SBO and slow motility  6/10 - questan seemed to help

## 2022-06-10 NOTE — ASSESSMENT & PLAN NOTE
Recent Labs     06/09/22  0730 06/09/22  1203 06/09/22  1549 06/09/22  1818 06/10/22  0008 06/10/22  0627   POCTGLUCOSE 124* 121* 206* 165* 117* 131*     Not on meds

## 2022-06-10 NOTE — SUBJECTIVE & OBJECTIVE
Interval History: see above    Review of Systems   Constitutional:  Positive for fatigue. Negative for activity change, chills and fever.   HENT:  Negative for congestion and sore throat.    Eyes:  Negative for pain.   Respiratory:  Negative for apnea, cough, chest tightness and shortness of breath.    Cardiovascular:  Negative for chest pain.   Gastrointestinal:  Positive for abdominal pain (improved) and diarrhea. Negative for anal bleeding, constipation, nausea and vomiting.   Genitourinary:  Positive for frequency and urgency (chronic w incontinance). Negative for difficulty urinating and dysuria.   Musculoskeletal:  Negative for arthralgias, back pain, joint swelling and neck pain.   Skin:  Negative for rash and wound.   Neurological:  Negative for dizziness, seizures, syncope, light-headedness, numbness and headaches.   Psychiatric/Behavioral:  Negative for agitation, behavioral problems, confusion, hallucinations, self-injury and sleep disturbance. The patient is not nervous/anxious.    Objective:     Vital Signs (Most Recent):  Temp: 97.9 °F (36.6 °C) (06/10/22 0706)  Pulse: 67 (06/10/22 0706)  Resp: 14 (06/10/22 0706)  BP: (!) 97/53 (06/10/22 0706)  SpO2: 96 % (06/10/22 0706)   Vital Signs (24h Range):  Temp:  [97.6 °F (36.4 °C)-98.2 °F (36.8 °C)] 97.9 °F (36.6 °C)  Pulse:  [67-82] 67  Resp:  [14-20] 14  SpO2:  [95 %-98 %] 96 %  BP: ()/(51-85) 97/53     Weight: 56.7 kg (125 lb)  Body mass index is 22.86 kg/m².    Intake/Output Summary (Last 24 hours) at 6/10/2022 0728  Last data filed at 6/10/2022 0651  Gross per 24 hour   Intake 879.13 ml   Output --   Net 879.13 ml        Physical Exam  Constitutional:       General: She is not in acute distress.     Appearance: Normal appearance. She is ill-appearing. She is not toxic-appearing.      Comments: Frail, elderly   HENT:      Head: Normocephalic and atraumatic.      Nose: Nose normal.      Mouth/Throat:      Mouth: Mucous membranes are moist.   Eyes:       General: No scleral icterus.     Extraocular Movements: Extraocular movements intact.      Pupils: Pupils are equal, round, and reactive to light.   Cardiovascular:      Rate and Rhythm: Normal rate and regular rhythm.      Pulses: Normal pulses.      Heart sounds: Normal heart sounds.   Pulmonary:      Effort: Pulmonary effort is normal.      Breath sounds: Normal breath sounds. No wheezing or rhonchi.   Chest:      Chest wall: No tenderness.   Abdominal:      General: Abdomen is flat. Bowel sounds are normal. There is no distension.      Palpations: Abdomen is soft. There is no mass.      Tenderness: There is no abdominal tenderness. There is no right CVA tenderness, left CVA tenderness, guarding or rebound.      Hernia: No hernia is present.   Musculoskeletal:         General: No swelling, tenderness, deformity or signs of injury. Normal range of motion.      Cervical back: Normal range of motion and neck supple. No rigidity or tenderness.   Skin:     General: Skin is warm and dry.      Coloration: Skin is not jaundiced.      Findings: No bruising or erythema.   Neurological:      General: No focal deficit present.      Mental Status: She is alert and oriented to person, place, and time. Mental status is at baseline.      Cranial Nerves: No cranial nerve deficit.      Motor: No weakness.   Psychiatric:         Mood and Affect: Mood normal.         Behavior: Behavior normal.         Thought Content: Thought content normal.         Judgment: Judgment normal.       Significant Labs: All pertinent labs within the past 24 hours have been reviewed.  CBC:   Recent Labs   Lab 06/08/22  0822   WBC 4.26   HGB 12.6   HCT 39.1   PLT 77*       CMP:   Recent Labs   Lab 06/08/22  0822      K 3.8      CO2 27   *   BUN 9   CREATININE 0.7   CALCIUM 9.7   PROT 6.7   ALBUMIN 3.9   BILITOT 1.6*   ALKPHOS 77   AST 36   ALT 34   ANIONGAP 9   EGFRNONAA >60.0         Significant Imaging: I have reviewed all pertinent  imaging results/findings within the past 24 hours.

## 2022-06-10 NOTE — PROGRESS NOTES
Matias fernie - [facility] Medicine  Progress Note    Patient Name: Anayeli Judd  MRN: [id number]  Patient Class: IP- Inpatient   Admission Date: 6/7/2022  Length of Stay: 3 days  Attending Physician: Marianna Cho MD  Primary Care Provider: Darci Guthrie MD        Subjective:     Principal Problem:Partial small bowel obstruction        HPI:  Anayeli Judd is a 77 y.o. 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 She underwent ex-lap and attempted TIFFANY 3 months ago but the procedure was aborted due to dense adhesions. She was admitted most recently on 5/28 for concerns related to recurrent pSBO. At that time she was admitted to obs and had quick return of bowel function. She presents today with 1 day history of abdominal pain, nausea and increase bowel movements. Reports abdominal cramping started this morning. Pain has improved significantly since she has been in the ED. Distention has resolved. She had one episode of nausea this am, no reported emesis. She has had an increase in loose, non-bloody bowel movements today. She normally has 3-4 bowel movements per day, today she has had 7-8, per report from her care taker.      In ED she is HDS. Lab works relatively unremarkable.   CT scan similar to previous with distended loops of small bowel. Noted to have enlarge, distended bladder on CT.   General Surgery consulted and evaluated patient. Felt no surgical indication necessary at this time and recommended admission to  with GI consult. Patient admitted to the care of medicine for further evaluation and management.      Overview/Hospital Course:  6/9- appreciate GI consult. C diff neg, stool cult in process, lab - ok. Bili 1.6. /52 Pulse 70  AF. 4 urinations, 4 stools  6/10- 3 stools yesterday, questran added, stool cult - still pending, Pt started to sip boost and minimal oral intake.  Advance to full liquid diet.  Eating better. BM kendall  less frequency. Pt is happy. DTR with her in room.        Interval History: see above    Review of Systems   Constitutional:  Positive for fatigue. Negative for activity change, chills and fever.   HENT:  Negative for congestion and sore throat.    Eyes:  Negative for pain.   Respiratory:  Negative for apnea, cough, chest tightness and shortness of breath.    Cardiovascular:  Negative for chest pain.   Gastrointestinal:  Positive for abdominal pain (improved) and diarrhea. Negative for anal bleeding, constipation, nausea and vomiting.   Genitourinary:  Positive for frequency and urgency (chronic w incontinance). Negative for difficulty urinating and dysuria.   Musculoskeletal:  Negative for arthralgias, back pain, joint swelling and neck pain.   Skin:  Negative for rash and wound.   Neurological:  Negative for dizziness, seizures, syncope, light-headedness, numbness and headaches.   Psychiatric/Behavioral:  Negative for agitation, behavioral problems, confusion, hallucinations, self-injury and sleep disturbance. The patient is not nervous/anxious.    Objective:     Vital Signs (Most Recent):  Temp: 97.9 °F (36.6 °C) (06/10/22 0706)  Pulse: 67 (06/10/22 0706)  Resp: 14 (06/10/22 0706)  BP: (!) 97/53 (06/10/22 0706)  SpO2: 96 % (06/10/22 0706)   Vital Signs (24h Range):  Temp:  [97.6 °F (36.4 °C)-98.2 °F (36.8 °C)] 97.9 °F (36.6 °C)  Pulse:  [67-82] 67  Resp:  [14-20] 14  SpO2:  [95 %-98 %] 96 %  BP: ()/(51-85) 97/53     Weight: 56.7 kg (125 lb)  Body mass index is 22.86 kg/m².    Intake/Output Summary (Last 24 hours) at 6/10/2022 0731  Last data filed at 6/10/2022 0651  Gross per 24 hour   Intake 879.13 ml   Output --   Net 879.13 ml        Physical Exam  Constitutional:       General: She is not in acute distress.     Appearance: Normal appearance. She is ill-appearing. She is not toxic-appearing.      Comments: Frail, elderly   HENT:      Head: Normocephalic and atraumatic.      Nose: Nose normal.       Mouth/Throat:      Mouth: Mucous membranes are moist.   Eyes:      General: No scleral icterus.     Extraocular Movements: Extraocular movements intact.      Pupils: Pupils are equal, round, and reactive to light.   Cardiovascular:      Rate and Rhythm: Normal rate and regular rhythm.      Pulses: Normal pulses.      Heart sounds: Normal heart sounds.   Pulmonary:      Effort: Pulmonary effort is normal.      Breath sounds: Normal breath sounds. No wheezing or rhonchi.   Chest:      Chest wall: No tenderness.   Abdominal:      General: Abdomen is flat. Bowel sounds are normal. There is no distension.      Palpations: Abdomen is soft. There is no mass.      Tenderness: There is no abdominal tenderness. There is no right CVA tenderness, left CVA tenderness, guarding or rebound.      Hernia: No hernia is present.   Musculoskeletal:         General: No swelling, tenderness, deformity or signs of injury. Normal range of motion.      Cervical back: Normal range of motion and neck supple. No rigidity or tenderness.   Skin:     General: Skin is warm and dry.      Coloration: Skin is not jaundiced.      Findings: No bruising or erythema.   Neurological:      General: No focal deficit present.      Mental Status: She is alert and oriented to person, place, and time. Mental status is at baseline.      Cranial Nerves: No cranial nerve deficit.      Motor: No weakness.   Psychiatric:         Mood and Affect: Mood normal.         Behavior: Behavior normal.         Thought Content: Thought content normal.         Judgment: Judgment normal.       Significant Labs: All pertinent labs within the past 24 hours have been reviewed.  CBC:   Recent Labs   Lab 06/08/22  0822   WBC 4.26   HGB 12.6   HCT 39.1   PLT 77*       CMP:   Recent Labs   Lab 06/08/22  0822      K 3.8      CO2 27   *   BUN 9   CREATININE 0.7   CALCIUM 9.7   PROT 6.7   ALBUMIN 3.9   BILITOT 1.6*   ALKPHOS 77   AST 36   ALT 34   ANIONGAP 9   EGFRNONAA  >60.0         Significant Imaging: I have reviewed all pertinent imaging results/findings within the past 24 hours.      Assessment/Plan:      * Partial small bowel obstruction  77 y.o. femlae w hx oc colon ca, adhesions and multiple admissions for SBO in the past, present w and pain and daihhrea, s/p GI and surger consults. C diff neg, stool cult pending, may start questan or immodium if needed.     - CT Abdomen: Distended loops of small bowel in the right hemiabdomen with decompressed loops and soft tissue thickening near the midline.  Overall configuration the distended bowel loops appears similar to prior with slightly lesser degree of distension.  Findings may reflect recurrent partial small bowel obstruction.  Possible small bowel transition point near the probable adhesive change in the right mid abdomen anteriorly.   - patient is passing gas and last BM today 06/08  ;  Currently with nausea without vomiting  - General surgery consulted and evaluated patient  ;  Appreciate recs  - No acute surgical intervention warranted   - clear liquids, PRN pain management trying to minimize narcotics  - inpatient GI consult for optimization of bowel regimen, GI motility, and dietary modifications that may assist in her symptomatology.  - LR 50ml/hr continuous  - if patient develops vomiting would consider NGT placement  - further management pending clinical course and future study review    6/9- Per GI recs  C diff neg  -okay to intiate scheduled anti-motility agents with IMODIUM TID or QUESTRAN 4 g QID with instructions to stop medication when bowel movement frequency decreases to less than 3/day.   -Would avoid BENTYL given history of recurrent SBO.   -Okay for gluten free diet from GI standpoint.   -No plans for endoscopic intervention given recent EGD and colonoscopy in 05/2022 that were normal.   6/9- on clear liquid diet, 4 stools. Midline placed. Boost. Start questran bid to slow bowl motility. Goal is 3 BMs daily  or less, may add immodium later if needed  6/10- stable, advance diet    Abdominal discomfort  Due to Adhesions  - on tylenol and zofran  - trying to avoid bentyl, opioids in that they can ppt SBO and slow motility  6/10 - questan seemed to help        Chronic low back pain  Resume gabapentin 100 bid    Adhesion of abdominal wall  Chronic  S/p laproscopic surgeries      Type 2 diabetes mellitus with diabetic peripheral angiopathy without gangrene, without long-term current use of insulin  Recent Labs     06/09/22  0730 06/09/22  1203 06/09/22  1549 06/09/22  1818 06/10/22  0008 06/10/22  0627   POCTGLUCOSE 124* 121* 206* 165* 117* 131*     Not on meds      Chronic diarrhea  Questran started t bid, can increase to qid   May also start  immodium  Stool cult - still pending  c diff neg      History of colon cancer  Remote  Completed treatments  S/p partial colectomy      Other hyperlipidemia  - home statin      Memory change  - home aricept      Gait instability  Fall precautions    Hypothyroid  - home synthroid      VTE Risk Mitigation (From admission, onward)         Ordered     enoxaparin injection 40 mg  Daily         06/07/22 2012     Place sequential compression device  Until discontinued         06/07/22 2012                Discharge Planning   LOI: 6/11/2022     Code Status: Full Code   Is the patient medically ready for discharge?: No    Reason for patient still in hospital (select all that apply): Patient trending condition  Discharge Plan A: Home          Marianna Cho MD  Department of Hospital Medicine   [facility]

## 2022-06-10 NOTE — ASSESSMENT & PLAN NOTE
77 y.o. femlae w hx oc colon ca, adhesions and multiple admissions for SBO in the past, present w and pain and daihhrea, s/p GI and surger consults. C diff neg, stool cult pending, may start questan or immodium if needed.     - CT Abdomen: Distended loops of small bowel in the right hemiabdomen with decompressed loops and soft tissue thickening near the midline.  Overall configuration the distended bowel loops appears similar to prior with slightly lesser degree of distension.  Findings may reflect recurrent partial small bowel obstruction.  Possible small bowel transition point near the probable adhesive change in the right mid abdomen anteriorly.   - patient is passing gas and last BM today 06/08  ;  Currently with nausea without vomiting  - General surgery consulted and evaluated patient  ;  Appreciate recs  - No acute surgical intervention warranted   - clear liquids, PRN pain management trying to minimize narcotics  - inpatient GI consult for optimization of bowel regimen, GI motility, and dietary modifications that may assist in her symptomatology.  - LR 50ml/hr continuous  - if patient develops vomiting would consider NGT placement  - further management pending clinical course and future study review    6/9- Per GI recs  C diff neg  -okay to intiate scheduled anti-motility agents with IMODIUM TID or QUESTRAN 4 g QID with instructions to stop medication when bowel movement frequency decreases to less than 3/day.   -Would avoid BENTYL given history of recurrent SBO.   -Okay for gluten free diet from GI standpoint.   -No plans for endoscopic intervention given recent EGD and colonoscopy in 05/2022 that were normal.   6/9- on clear liquid diet, 4 stools. Midline placed. Boost. Start questran bid to slow bowl motility. Goal is 3 BMs daily or less, may add immodium later if needed  6/10- stable, advance diet

## 2022-06-10 NOTE — ASSESSMENT & PLAN NOTE
Questran started t bid, can increase to qid   May also start  immodium  Stool cult - still pending  c diff neg

## 2022-06-11 VITALS
WEIGHT: 125 LBS | DIASTOLIC BLOOD PRESSURE: 62 MMHG | OXYGEN SATURATION: 96 % | HEIGHT: 62 IN | RESPIRATION RATE: 16 BRPM | HEART RATE: 64 BPM | BODY MASS INDEX: 23 KG/M2 | SYSTOLIC BLOOD PRESSURE: 108 MMHG | TEMPERATURE: 97 F

## 2022-06-11 LAB
ALBUMIN SERPL BCP-MCNC: 3.2 G/DL (ref 3.5–5.2)
ALP SERPL-CCNC: 68 U/L (ref 55–135)
ALT SERPL W/O P-5'-P-CCNC: 22 U/L (ref 10–44)
ANION GAP SERPL CALC-SCNC: 11 MMOL/L (ref 8–16)
AST SERPL-CCNC: 22 U/L (ref 10–40)
BILIRUB SERPL-MCNC: 0.9 MG/DL (ref 0.1–1)
BUN SERPL-MCNC: 4 MG/DL (ref 8–23)
CALCIUM SERPL-MCNC: 9 MG/DL (ref 8.7–10.5)
CHLORIDE SERPL-SCNC: 108 MMOL/L (ref 95–110)
CO2 SERPL-SCNC: 21 MMOL/L (ref 23–29)
CREAT SERPL-MCNC: 0.7 MG/DL (ref 0.5–1.4)
EST. GFR  (AFRICAN AMERICAN): >60 ML/MIN/1.73 M^2
EST. GFR  (NON AFRICAN AMERICAN): >60 ML/MIN/1.73 M^2
GLUCOSE SERPL-MCNC: 115 MG/DL (ref 70–110)
POCT GLUCOSE: 128 MG/DL (ref 70–110)
POCT GLUCOSE: 129 MG/DL (ref 70–110)
POTASSIUM SERPL-SCNC: 3.6 MMOL/L (ref 3.5–5.1)
PROT SERPL-MCNC: 6.1 G/DL (ref 6–8.4)
SODIUM SERPL-SCNC: 140 MMOL/L (ref 136–145)
T4 FREE SERPL-MCNC: 1.06 NG/DL (ref 0.71–1.51)
TSH SERPL DL<=0.005 MIU/L-ACNC: 4.01 UIU/ML (ref 0.4–4)

## 2022-06-11 PROCEDURE — 25000003 PHARM REV CODE 250: Performed by: HOSPITALIST

## 2022-06-11 PROCEDURE — 99239 PR HOSPITAL DISCHARGE DAY,>30 MIN: ICD-10-PCS | Mod: ,,, | Performed by: HOSPITALIST

## 2022-06-11 PROCEDURE — 84443 ASSAY THYROID STIM HORMONE: CPT | Performed by: HOSPITALIST

## 2022-06-11 PROCEDURE — 25000003 PHARM REV CODE 250: Performed by: FAMILY MEDICINE

## 2022-06-11 PROCEDURE — 84439 ASSAY OF FREE THYROXINE: CPT | Performed by: HOSPITALIST

## 2022-06-11 PROCEDURE — 36415 COLL VENOUS BLD VENIPUNCTURE: CPT | Performed by: HOSPITALIST

## 2022-06-11 PROCEDURE — 80053 COMPREHEN METABOLIC PANEL: CPT | Performed by: HOSPITALIST

## 2022-06-11 PROCEDURE — 99239 HOSP IP/OBS DSCHRG MGMT >30: CPT | Mod: ,,, | Performed by: HOSPITALIST

## 2022-06-11 RX ORDER — IBUPROFEN 200 MG
200 TABLET ORAL EVERY 6 HOURS PRN
Qty: 30 TABLET | Refills: 0 | Status: SHIPPED | OUTPATIENT
Start: 2022-06-11 | End: 2022-07-11

## 2022-06-11 RX ORDER — GABAPENTIN 100 MG/1
100 CAPSULE ORAL 2 TIMES DAILY
Qty: 60 CAPSULE | Refills: 11 | Status: SHIPPED | OUTPATIENT
Start: 2022-06-11 | End: 2022-06-20

## 2022-06-11 RX ORDER — CHOLESTYRAMINE 4 G/9G
1 POWDER, FOR SUSPENSION ORAL 2 TIMES DAILY
Qty: 180 PACKET | Refills: 3 | Status: SHIPPED | OUTPATIENT
Start: 2022-06-11 | End: 2022-09-23

## 2022-06-11 RX ORDER — LEVOTHYROXINE SODIUM 75 UG/1
75 TABLET ORAL
Qty: 30 TABLET | Refills: 11 | Status: SHIPPED | OUTPATIENT
Start: 2022-06-12 | End: 2022-11-28

## 2022-06-11 RX ADMIN — CHOLESTYRAMINE 4 G: 4 POWDER, FOR SUSPENSION ORAL at 08:06

## 2022-06-11 RX ADMIN — PANTOPRAZOLE SODIUM 20 MG: 20 TABLET, DELAYED RELEASE ORAL at 08:06

## 2022-06-11 RX ADMIN — GABAPENTIN 100 MG: 100 CAPSULE ORAL at 08:06

## 2022-06-11 RX ADMIN — ATORVASTATIN CALCIUM 40 MG: 20 TABLET, FILM COATED ORAL at 08:06

## 2022-06-11 RX ADMIN — DULOXETINE 30 MG: 30 CAPSULE, DELAYED RELEASE ORAL at 08:06

## 2022-06-11 RX ADMIN — LEVOTHYROXINE SODIUM 75 MCG: 75 TABLET ORAL at 05:06

## 2022-06-11 RX ADMIN — MONTELUKAST 10 MG: 10 TABLET, FILM COATED ORAL at 08:06

## 2022-06-11 NOTE — NURSING
D/C instructions reveiwed w/ pt, daughter, who verbalized understanding. All questions/concerns addressed at this time. Pt having frequent loose BM's, MD aware and pt/daughter comfortable with D/C. Pt to  prescribed meds from home pharmacy upon D/C. Midline catheter removed. Pt to leave unit via transport wheelchair.

## 2022-06-11 NOTE — PLAN OF CARE
POC reviewed w/ pt, verbalized understanding. Pt AAOx4. VSS on RA. ML incision to abdomen healing. Pain managed with PRN pain meds.Voided per toilet, X2 small BM occurred. Pt tolerating regular diet with no c/o nausea. Ambulated in room X1 assist. BG checks Q6, no coverage needed. Frequent rounds made for pt safety. Call light in reach and bed in lowest position. Will continue to monitor POC.

## 2022-06-11 NOTE — ASSESSMENT & PLAN NOTE
Recent Labs     06/09/22  1818 06/10/22  0008 06/10/22  0627 06/10/22  1654 06/11/22  0013 06/11/22  0601   POCTGLUCOSE 165* 117* 131* 116* 129* 128*     Not on meds

## 2022-06-11 NOTE — PLAN OF CARE
POC reviewed with patient and daughter, understanding verbalized. Old ML incision healing. LR infusing @ 50 through ML. Up to RR with standby assist for 3 total BMs. Denies pain. Q6 POCT done, no coverage needed tonight. Pt ANOx4, VSS on RA. Pt able to make needs known and remains free of fall or injury as safety measures intact. Will continue to monitor.        Problem: Diabetes Comorbidity  Goal: Blood Glucose Level Within Targeted Range  Outcome: Ongoing, Progressing     Problem: Infection  Goal: Absence of Infection Signs and Symptoms  Outcome: Ongoing, Progressing     Problem: Adult Inpatient Plan of Care  Goal: Readiness for Transition of Care  Outcome: Ongoing, Progressing     Problem: Adult Inpatient Plan of Care  Goal: Optimal Comfort and Wellbeing  Outcome: Ongoing, Progressing

## 2022-06-11 NOTE — ASSESSMENT & PLAN NOTE
77 y.o. femlae w hx oc colon ca, adhesions and multiple admissions for SBO in the past, present w and pain and daihhrea, s/p GI and surger consults. C diff neg, stool cult pending, may start questan or immodium if needed.     - CT Abdomen: Distended loops of small bowel in the right hemiabdomen with decompressed loops and soft tissue thickening near the midline.  Overall configuration the distended bowel loops appears similar to prior with slightly lesser degree of distension.  Findings may reflect recurrent partial small bowel obstruction.  Possible small bowel transition point near the probable adhesive change in the right mid abdomen anteriorly.   - patient is passing gas and last BM today 06/08  ;  Currently with nausea without vomiting  - General surgery consulted and evaluated patient  ;  Appreciate recs  - No acute surgical intervention warranted   - clear liquids, PRN pain management trying to minimize narcotics  - inpatient GI consult for optimization of bowel regimen, GI motility, and dietary modifications that may assist in her symptomatology.  - LR 50ml/hr continuous  - if patient develops vomiting would consider NGT placement  - further management pending clinical course and future study review    6/9- Per GI recs  C diff neg  -okay to intiate scheduled anti-motility agents with IMODIUM TID or QUESTRAN 4 g QID with instructions to stop medication when bowel movement frequency decreases to less than 3/day.   -Would avoid BENTYL given history of recurrent SBO.   -Okay for gluten free diet from GI standpoint.   -No plans for endoscopic intervention given recent EGD and colonoscopy in 05/2022 that were normal.   6/9- on clear liquid diet, 4 stools. Midline placed. Boost. Start questran bid to slow bowl motility. Goal is 3 BMs daily or less, may add immodium later if needed  6/11- stable, tolerating advanced diet, labs- ok. Will dc to home. F/u pcp

## 2022-06-11 NOTE — SUBJECTIVE & OBJECTIVE
Interval History: see above    Review of Systems   Constitutional:  Positive for fatigue. Negative for activity change, chills and fever.   HENT:  Negative for congestion and sore throat.    Eyes:  Negative for pain.   Respiratory:  Negative for apnea, cough, chest tightness and shortness of breath.    Cardiovascular:  Negative for chest pain.   Gastrointestinal:  Positive for abdominal pain (improved). Negative for anal bleeding, constipation, diarrhea, nausea and vomiting.   Genitourinary:  Positive for frequency and urgency (chronic w incontinance). Negative for difficulty urinating and dysuria.   Musculoskeletal:  Negative for arthralgias, back pain, joint swelling and neck pain.   Skin:  Negative for rash and wound.   Neurological:  Negative for dizziness, seizures, syncope, light-headedness, numbness and headaches.   Psychiatric/Behavioral:  Negative for agitation, behavioral problems, confusion, hallucinations, self-injury and sleep disturbance. The patient is not nervous/anxious.    Objective:     Vital Signs (Most Recent):  Temp: 97 °F (36.1 °C) (06/11/22 0718)  Pulse: 64 (06/11/22 0718)  Resp: 16 (06/11/22 0718)  BP: (!) 97/56 (06/11/22 0436)  SpO2: 96 % (06/11/22 0718)   Vital Signs (24h Range):  Temp:  [96.5 °F (35.8 °C)-97.9 °F (36.6 °C)] 97 °F (36.1 °C)  Pulse:  [61-74] 64  Resp:  [15-20] 16  SpO2:  [95 %-100 %] 96 %  BP: ()/(56-78) 97/56     Weight: 56.7 kg (125 lb)  Body mass index is 22.86 kg/m².    Intake/Output Summary (Last 24 hours) at 6/11/2022 0741  Last data filed at 6/11/2022 0612  Gross per 24 hour   Intake 1641.58 ml   Output --   Net 1641.58 ml        Physical Exam  Constitutional:       General: She is not in acute distress.     Appearance: Normal appearance. She is ill-appearing. She is not toxic-appearing.      Comments: Frail, elderly   HENT:      Head: Normocephalic and atraumatic.      Nose: Nose normal.      Mouth/Throat:      Mouth: Mucous membranes are moist.   Eyes:       General: No scleral icterus.     Extraocular Movements: Extraocular movements intact.      Pupils: Pupils are equal, round, and reactive to light.   Cardiovascular:      Rate and Rhythm: Normal rate and regular rhythm.      Pulses: Normal pulses.      Heart sounds: Normal heart sounds.   Pulmonary:      Effort: Pulmonary effort is normal.      Breath sounds: Normal breath sounds. No wheezing or rhonchi.   Chest:      Chest wall: No tenderness.   Abdominal:      General: Abdomen is flat. Bowel sounds are normal. There is no distension.      Palpations: Abdomen is soft. There is no mass.      Tenderness: There is no abdominal tenderness. There is no right CVA tenderness, left CVA tenderness, guarding or rebound.      Hernia: No hernia is present.   Musculoskeletal:         General: No swelling, tenderness, deformity or signs of injury. Normal range of motion.      Cervical back: Normal range of motion and neck supple. No rigidity or tenderness.   Skin:     General: Skin is warm and dry.      Coloration: Skin is not jaundiced.      Findings: No bruising or erythema.   Neurological:      General: No focal deficit present.      Mental Status: She is alert and oriented to person, place, and time. Mental status is at baseline.      Cranial Nerves: No cranial nerve deficit.      Motor: No weakness.   Psychiatric:         Mood and Affect: Mood normal.         Behavior: Behavior normal.         Thought Content: Thought content normal.         Judgment: Judgment normal.       Significant Labs: All pertinent labs within the past 24 hours have been reviewed.  CBC:   No results for input(s): WBC, HGB, HCT, PLT in the last 48 hours.    CMP:   No results for input(s): NA, K, CL, CO2, GLU, BUN, CREATININE, CALCIUM, PROT, ALBUMIN, BILITOT, ALKPHOS, AST, ALT, ANIONGAP, EGFRNONAA in the last 48 hours.    Invalid input(s): ESTGF[place]      Significant Imaging: I have reviewed all pertinent imaging results/findings within the past 24  hours.

## 2022-06-11 NOTE — ASSESSMENT & PLAN NOTE
Questran started t bid, can increase to qid   May also start  immodium  Stool cult - still pending, giardia, cryptopsporin and OCP negative  c diff neg

## 2022-06-11 NOTE — DISCHARGE SUMMARY
[facility] Medicine  Discharge Summary      Patient Name: Anayeli Judd  MRN: [id number]  Patient Class: IP- Inpatient  Admission Date: 6/7/2022  Hospital Length of Stay: 4 days  Discharge Date and Time: 6/11/2022  9:58 AM  Attending Physician: No att. providers found   Discharging Provider: Marianna Cho MD  Primary Care Provider: Darci Guthrie MD  [facility] Medicine Team: [facility]OSP MED N Marianna Cho MD    HPI:   Anayeli Judd is a 77 y.o. 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 She underwent ex-lap and attempted TIFFANY 3 months ago but the procedure was aborted due to dense adhesions. She was admitted most recently on 5/28 for concerns related to recurrent pSBO. At that time she was admitted to [facility] and had quick return of bowel function. She presents today with 1 day history of abdominal pain, nausea and increase bowel movements. Reports abdominal cramping started this morning. Pain has improved significantly since she has been in the ED. Distention has resolved. She had one episode of nausea this am, no reported emesis. She has had an increase in loose, non-bloody bowel movements today. She normally has 3-4 bowel movements per day, today she has had 7-8, per report from her care taker.      In ED she is HDS. Lab works relatively unremarkable.   CT scan similar to previous with distended loops of small bowel. Noted to have enlarge, distended bladder on CT.   General Surgery consulted and evaluated patient. Felt no surgical indication necessary at this time and recommended admission to  with GI consult. Patient admitted to the care of medicine for further evaluation and management.      * No surgery found *      Hospital Course:   6/9- appreciate GI consult. C diff neg, stool cult in process, lab - ok. Bili 1.6. /52 Pulse 70  AF. 4 urinations, 4 stools  6/10- 3 stools yesterday, questran added, stool cult - still  pending, Pt started to sip boost and minimal oral intake.  Advance to full liquid diet.  Eating better. BM w less frequency. Pt is happy. DTR with her in room.  6/11- eating 50 % of reg diet, and 3 BMs. Abd pain improved. Will dc to home.        Goals of Care Treatment Preferences:  Code Status: Full Code    Living Will: Yes              Consults:   Consults (From admission, onward)        Status Ordering Provider     Inpatient consult to Midline team  Once        Provider:  (Not yet assigned)    Completed CHINEDU BARLOW     Inpatient consult to Gastroenterology  Once        Provider:  (Not yet assigned)    Completed KELLY RAMEY     Inpatient consult to General surgery  Once        Provider:  (Not yet assigned)    Completed JAMES OSUNA          * Partial small bowel obstruction  77 y.o. femlae w hx oc colon ca, adhesions and multiple admissions for SBO in the past, present w and pain and daihhrea, s/p GI and surger consults. C diff neg, stool cult pending, may start questan or immodium if needed.     - CT Abdomen: Distended loops of small bowel in the right hemiabdomen with decompressed loops and soft tissue thickening near the midline.  Overall configuration the distended bowel loops appears similar to prior with slightly lesser degree of distension.  Findings may reflect recurrent partial small bowel obstruction.  Possible small bowel transition point near the probable adhesive change in the right mid abdomen anteriorly.   - patient is passing gas and last BM today 06/08  ;  Currently with nausea without vomiting  - General surgery consulted and evaluated patient  ;  Appreciate recs  - No acute surgical intervention warranted   - clear liquids, PRN pain management trying to minimize narcotics  - inpatient GI consult for optimization of bowel regimen, GI motility, and dietary modifications that may assist in her symptomatology.  - LR 50ml/hr continuous  - if patient develops vomiting would consider  NGT placement  - further management pending clinical course and future study review    6/9- Per GI recs  C diff neg  -okay to intiate scheduled anti-motility agents with IMODIUM TID or QUESTRAN 4 g QID with instructions to stop medication when bowel movement frequency decreases to less than 3/day.   -Would avoid BENTYL given history of recurrent SBO.   -Okay for gluten free diet from GI standpoint.   -No plans for endoscopic intervention given recent EGD and colonoscopy in 05/2022 that were normal.   6/9- on clear liquid diet, 4 stools. Midline placed. Boost. Start questran bid to slow bowl motility. Goal is 3 BMs daily or less, may add immodium later if needed  6/11- stable, tolerating advanced diet, labs- ok. Will dc to home. F/u pcp    Abdominal discomfort  Due to Adhesions  - on tylenol and zofran  - trying to avoid bentyl, opioids in that they can ppt SBO and slow motility  6/10 - questan seemed to help        Chronic low back pain  Resume gabapentin 100 bid    Adhesion of abdominal wall  Chronic  S/p laproscopic surgeries      Type 2 diabetes mellitus with diabetic peripheral angiopathy without gangrene, without long-term current use of insulin  Recent Labs     06/09/22  1818 06/10/22  0008 06/10/22  0627 06/10/22  1654 06/11/22  0013 06/11/22  0601   POCTGLUCOSE 165* 117* 131* 116* 129* 128*     Not on meds      Chronic diarrhea  Questran started t bid, can increase to qid   May also start  immodium  Stool cult - still pending, giardia, cryptopsporin and OCP negative  c diff neg      History of colon cancer  Remote  Completed treatments  S/p partial colectomy      Other hyperlipidemia  - home statin      Memory change  - home aricept      Gait instability  Fall precautions    Hypothyroid  - home synthroid  TSH- in process        Final Active Diagnoses:    Diagnosis Date Noted POA    PRINCIPAL PROBLEM:  Partial small bowel obstruction [K56.600] 09/01/2014 Yes    Abdominal discomfort [R10.9] 01/22/2022 Yes     Chronic low back pain [M54.50, G89.29] 06/09/2022 Yes    Adhesion of abdominal wall [K66.0] 03/04/2022 Yes    Type 2 diabetes mellitus with diabetic peripheral angiopathy without gangrene, without long-term current use of insulin [E11.51] 01/05/2022 Yes    Chronic diarrhea [K52.9] 07/27/2017 Yes    History of colon cancer [Z85.038] 04/05/2013 Yes     Chronic    Other hyperlipidemia [E78.49] 11/10/2021 Yes    Memory change [R41.3] 08/10/2021 Yes    Gait instability [R26.81] 03/24/2015 Yes     Chronic    Hypothyroid [E03.9] 01/11/2013 Yes     Chronic      Problems Resolved During this Admission:       Discharged Condition: good    Disposition: Home or Self Care    Follow Up:    Patient Instructions:   No discharge procedures on file.    Significant Diagnostic Studies: Labs:   CMP   Recent Labs   Lab 06/11/22  0514      K 3.6      CO2 21*   *   BUN 4*   CREATININE 0.7   CALCIUM 9.0   PROT 6.1   ALBUMIN 3.2*   BILITOT 0.9   ALKPHOS 68   AST 22   ALT 22   ANIONGAP 11   ESTGFRAFRICA >60.0   EGFRNONAA >60.0    and CBC No results for input(s): WBC, HGB, HCT, PLT in the last 48 hours.    Pending Diagnostic Studies:     None         Medications:  Reconciled Home Medications:      Medication List      START taking these medications    cholestyramine 4 gram packet  Commonly known as: QUESTRAN  Take 1 packet (4 g total) by mouth 2 (two) times daily.     gabapentin 100 MG capsule  Commonly known as: NEURONTIN  Take 1 capsule (100 mg total) by mouth 2 (two) times daily.  Replaces: gabapentin 600 MG tablet     ibuprofen 200 MG tablet  Commonly known as: ADVIL,MOTRIN  Take 1 tablet (200 mg total) by mouth every 6 (six) hours as needed (severe pain , give with tylenol).        CONTINUE taking these medications    acetaminophen 650 MG Tbsr  Commonly known as: TYLENOL  Take 650 mg by mouth once as needed (pain).     ascorbic acid (vitamin C) 1000 MG tablet  Commonly known as: VITAMIN C  Take 1,000 mg by  mouth once daily.     atorvastatin 40 MG tablet  Commonly known as: LIPITOR  TAKE 1 TABLET BY MOUTH  DAILY     azelastine 137 mcg (0.1 %) nasal spray  Commonly known as: ASTELIN  2 sprays (274 mcg total) by Nasal route 2 (two) times daily as needed for Rhinitis.     biotin 10,000 mcg Tbdl  Take 1 tablet by mouth once daily.     calcium-vitamin D3 500 mg-5 mcg (200 unit) per tablet  Commonly known as: OS-KASSANDRA 500 + D3  Take 1,200 tablets by mouth once daily.     co-enzyme Q-10 30 mg capsule  Take 30 mg by mouth 3 (three) times daily.     cranberry extract 200 mg Cap  Take 1 capsule by mouth once daily.     diclofenac sodium 1 % Gel  Commonly known as: VOLTAREN  Apply 2 g topically 4 (four) times daily as needed (Back pain).     donepeziL 5 MG tablet  Commonly known as: ARICEPT  Take 1 tablet (5 mg total) by mouth every evening.     DULoxetine 30 MG capsule  Commonly known as: CYMBALTA  Take 1 capsule (30 mg total) by mouth 2 (two) times daily.     famotidine 20 MG tablet  Commonly known as: PEPCID  Take 1 tablet (20 mg total) by mouth 2 (two) times daily as needed for Heartburn.     fluticasone propionate 50 mcg/actuation nasal spray  Commonly known as: FLONASE  1 spray by Each Nostril route daily as needed for Rhinitis.     FREESTYLE EFREN 14 DAY READER Misc  Generic drug: flash glucose scanning reader  Check blood glucose level 3 times daily.     FREESTYLE EFREN 14 DAY SENSOR Kit  Generic drug: flash glucose sensor  1 application by Misc.(Non-Drug; Combo Route) route every 14 (fourteen) days. Disp 30 or 90 day refill     FREESTYLE LITE STRIPS Strp  Generic drug: blood sugar diagnostic  TEST DAILY AS DIRECTED     hydrocortisone 2.5 % cream  Apply topically 2 (two) times daily as needed.     levothyroxine 75 MCG tablet  Commonly known as: SYNTHROID  Take 1 tablet (75 mcg total) by mouth before breakfast.  Start taking on: June 12, 2022     LIDOcaine 5 %  Commonly known as: LIDODERM  Place 1 patch onto the skin once  daily. Remove & Discard patch within 12 hours or as directed by MD     magnesium 250 mg Tab  Take 1,000 mg by mouth once daily.     metFORMIN 500 MG tablet  Commonly known as: GLUCOPHAGE  TAKE 1 TABLET(500 MG) BY MOUTH DAILY WITH BREAKFAST     montelukast 10 mg tablet  Commonly known as: SINGULAIR  TAKE 1 TABLET BY MOUTH  DAILY     ondansetron 8 MG Tbdl  Commonly known as: ZOFRAN-ODT  Take 1 tablet (8 mg total) by mouth every 8 (eight) hours as needed (nausea).     pantoprazole 20 MG tablet  Commonly known as: PROTONIX  Take 1 tablet (20 mg total) by mouth once daily.     PREMARIN vaginal cream  Generic drug: conjugated estrogens  INSERT 1/2 GRAM VAGINALLY  TWICE WEEKLY     vitamin D 1000 units Tab  Commonly known as: VITAMIN D3  Take 1,000 Units by mouth once daily. D3        STOP taking these medications    cefUROXime 500 MG tablet  Commonly known as: CEFTIN     D-MANNOSE ORAL     gabapentin 600 MG tablet  Commonly known as: NEURONTIN  Replaced by: gabapentin 100 MG capsule     hyoscyamine 0.125 mg Tab  Commonly known as: ANASPAZ,LEVSIN     polyethylene glycol 17 gram/dose powder  Commonly known as: GLYCOLAX            Indwelling Lines/Drains at time of discharge:   Lines/Drains/Airways     None                 Time spent on the discharge of patient: 35 minutes         Marianna Cho MD  Department of Hospital Medicine  [facility]

## 2022-06-13 ENCOUNTER — PATIENT MESSAGE (OUTPATIENT)
Dept: INTERNAL MEDICINE | Facility: CLINIC | Age: 77
End: 2022-06-13
Payer: MEDICARE

## 2022-06-13 ENCOUNTER — PATIENT OUTREACH (OUTPATIENT)
Dept: ADMINISTRATIVE | Facility: CLINIC | Age: 77
End: 2022-06-13
Payer: MEDICARE

## 2022-06-13 ENCOUNTER — PATIENT MESSAGE (OUTPATIENT)
Dept: ADMINISTRATIVE | Facility: CLINIC | Age: 77
End: 2022-06-13
Payer: MEDICARE

## 2022-06-13 ENCOUNTER — OUTPATIENT CASE MANAGEMENT (OUTPATIENT)
Dept: ADMINISTRATIVE | Facility: OTHER | Age: 77
End: 2022-06-13
Payer: MEDICARE

## 2022-06-13 LAB
BACTERIA STL CULT: NORMAL
E COLI SXT1 STL QL IA: NEGATIVE
E COLI SXT2 STL QL IA: NEGATIVE

## 2022-06-13 RX ORDER — FERROUS SULFATE 324(65)MG
324 TABLET, DELAYED RELEASE (ENTERIC COATED) ORAL DAILY
Refills: 0
Start: 2022-06-13

## 2022-06-13 NOTE — PROGRESS NOTES
Outpatient Care Management  Plan of Care Follow Up Visit    Patient: Anayeli Judd  MRN: [id number]  Date of Service: 06/13/2022  Completed by: Shanthi Whitaker RN  Referral Date: 03/04/2022  Program:     Reason for Visit   Patient presents with    OPCM Chart Review     6/13/2022    Follow-up     6/13/2022    Update Plan Of Care     6/13/2022       Brief Summary: Patient was recently hospitalized with Small Bowel Obstruction twice.  She has been told by Dr. Cho, hospitalist that was seeing patient in the hospital, that it is more important for the patient to get in three nutritious meals than it is for her to count her carbs.  She instructed her to be judicious about it and to limit but to eat nutritious meals.  For example, if she wants cake to eat a small slice but don't eat the whole cake.  Patient with recent admission for small bowel obstruction.  She had complaints of abdominal cramping, nausea, distention and 7-8 loose bowel movements on day of admission.  She was told she has scar tissue that residue can build up and block her bowels by Dr. Cho, hospitalist.  Patient has been following a diabetic diet in recent months.  Patient was told she has scar tissue in her abdomen from previous surgeries that cause narrowing in the bowels where residue from food can collect and cause bowel obstructions.  Patient does eat oatmeal and fresh fruit, including berries, every morning for breakfast.    CM reeducated patient that she can have nutritious meals and still watch her carb intake.  She should still not eat a lot of sugar in her diet.  CM educated patient, patient's daughter Wendy and Patient's caregiver Kris on signs and symptoms of small bowel obstruction such as belly cramping and pain, belly swelling and bloating, nausea, vomiting, cant pass gas, constipation and diarrhea.  CM sent educational information on Small Bowel Obstruction through the mail.  CM educated patient, patient's daughter Wendy  and patient's caregiver Kris on a Low Fiber (Residue) Diet.  CM informed them that patient should avoid any foods that have residue in the stool when eaten like corn or berries with seeds.  CM informed them that it is not a good idea to eat oatmeal everyday because of the residue it leaves and not to eat berries where the seeds can block the intestines.  CM also discussed that the patient should take in a good amount of water, at least 8 - 8 oz glasses a day.  CM also mailed educational information on low fiber diet.    Next Steps:   Follow up in two week  Follow up on low fiber (residue) diet  Follow up on using plate method  Follow up on signs and symptoms of SBO  Follow up on receipt of educational materials    Follow up in about 2 weeks (around 6/27/2022).    Patient Summary     Involvement of Care:  Do I have permission to speak with other family members about your care?  Yes    Patient Reported Labs & Vitals:  1.  Any Patient Reported Labs & Vitals?  No  2.  Patient Reported Blood Pressure:     3.  Patient Reported Pulse:     4.  Patient Reported Weight (Kg):     5.  Patient Reported Blood Glucose (mg/dl):       Medical and social history was reviewed with patient and/or caregiver.     Clinical Assessment     Reviewed and provided basic information on available community resources for mental health, transportation, wellness resources, and palliative care programs with patient and/or caregiver.     Complex Care Plan     Care plan was discussed and completed today with input from patient and/or caregiver.    Patient Instructions     Instructions were provided via the [employer] patient resources and are available for the patient to view on the patient portal.    Todays OPCM Self-Management Care Plan was developed with the patients/caregivers input and was based on identified barriers from todays assessment.  Goals were written today with the patient/caregiver and the patient has agreed to work towards these  goals to improve his/her overall well-being. Patient verbalized understanding of the care plan, goals, and all of today's instructions. Encouraged patient/caregiver to communicate with his/her physician and health care team about health conditions and the treatment plan.  Provided my contact information today and encouraged patient/caregiver to call me with any questions as needed.

## 2022-06-13 NOTE — PROGRESS NOTES
C3 nurse attempted to contact Anayeli Judd   for a TCC post hospital discharge follow up call. No answer. Left voicemail with callback information. The patient does not have a scheduled HOSFU appointment. Message sent to PCP staff for assistance with scheduling visit with patient.

## 2022-06-13 NOTE — PROGRESS NOTES
C3 nurse spoke with Anayeli Judd   for a TCC post hospital discharge follow up call. The patient has a scheduled HOSFU appointment with Darci Guthrie MD   on 6/17/22 @ 2:00.

## 2022-06-13 NOTE — LETTER
June 13, 2022    Anayeli Hardik Robelewdin  [address] [place] [address] [address]  [facility] [address]             Outpatient Case Management  [address]  [facility] [address] Dear Ms Anayeli,    Here is some educational information for you about Small Bowel Obstruction and Low Fiber (Residue) Diet.       If you need anything please give me, Lou Whitaker, a call Monday through Friday from 8:00-4:30 PM at [phone].     The Outpatient Case Management Department can be reached at [phone] from 8:00AM to 4:30 PM on Monday thru Friday. [facility] also has a program where a nurse is available 24/7 to answer questions or provide medical advice, their number is [phone].      Thanks,    Lou Whitaker RN  Outpatient Care Management  [phone]

## 2022-06-17 ENCOUNTER — TELEPHONE (OUTPATIENT)
Dept: INTERNAL MEDICINE | Facility: CLINIC | Age: 77
End: 2022-06-17
Payer: MEDICARE

## 2022-06-20 ENCOUNTER — PATIENT MESSAGE (OUTPATIENT)
Dept: INTERNAL MEDICINE | Facility: CLINIC | Age: 77
End: 2022-06-20

## 2022-06-20 ENCOUNTER — OFFICE VISIT (OUTPATIENT)
Dept: INTERNAL MEDICINE | Facility: CLINIC | Age: 77
End: 2022-06-20
Payer: MEDICARE

## 2022-06-20 VITALS
HEART RATE: 68 BPM | DIASTOLIC BLOOD PRESSURE: 77 MMHG | HEIGHT: 62 IN | TEMPERATURE: 99 F | BODY MASS INDEX: 22.68 KG/M2 | WEIGHT: 123.25 LBS | SYSTOLIC BLOOD PRESSURE: 105 MMHG

## 2022-06-20 DIAGNOSIS — Z09 HOSPITAL DISCHARGE FOLLOW-UP: Primary | ICD-10-CM

## 2022-06-20 DIAGNOSIS — J31.0 RHINITIS, UNSPECIFIED TYPE: ICD-10-CM

## 2022-06-20 DIAGNOSIS — M47.812 OSTEOARTHRITIS OF CERVICAL SPINE, UNSPECIFIED SPINAL OSTEOARTHRITIS COMPLICATION STATUS: ICD-10-CM

## 2022-06-20 DIAGNOSIS — K56.609 SBO (SMALL BOWEL OBSTRUCTION): ICD-10-CM

## 2022-06-20 DIAGNOSIS — M54.50 LUMBAR PAIN: ICD-10-CM

## 2022-06-20 PROCEDURE — 99213 OFFICE O/P EST LOW 20 MIN: CPT | Mod: S$PBB,,, | Performed by: INTERNAL MEDICINE

## 2022-06-20 PROCEDURE — 99215 OFFICE O/P EST HI 40 MIN: CPT | Mod: PBBFAC | Performed by: INTERNAL MEDICINE

## 2022-06-20 PROCEDURE — 99999 PR PBB SHADOW E&M-EST. PATIENT-LVL V: CPT | Mod: PBBFAC,,, | Performed by: INTERNAL MEDICINE

## 2022-06-20 PROCEDURE — 99999 PR PBB SHADOW E&M-EST. PATIENT-LVL V: ICD-10-PCS | Mod: PBBFAC,,, | Performed by: INTERNAL MEDICINE

## 2022-06-20 PROCEDURE — 99213 PR OFFICE/OUTPT VISIT, EST, LEVL III, 20-29 MIN: ICD-10-PCS | Mod: S$PBB,,, | Performed by: INTERNAL MEDICINE

## 2022-06-20 RX ORDER — GABAPENTIN 300 MG/1
300 CAPSULE ORAL 2 TIMES DAILY PRN
Qty: 60 CAPSULE | Refills: 5 | Status: SHIPPED | OUTPATIENT
Start: 2022-06-20 | End: 2022-06-21

## 2022-06-21 DIAGNOSIS — M54.50 LUMBAR PAIN: ICD-10-CM

## 2022-06-21 RX ORDER — GABAPENTIN 300 MG/1
300 CAPSULE ORAL 2 TIMES DAILY PRN
Qty: 60 CAPSULE | Refills: 5 | Status: SHIPPED | OUTPATIENT
Start: 2022-06-21 | End: 2022-08-04

## 2022-06-21 NOTE — PROGRESS NOTES
Subjective:       Patient ID: Anayeli Judd is a 77 y.o. female.    Chief Complaint: Hospital f/u (partial sbo)    HPI:  Recent hospitalization for recurrent partial sbo, long-standing issue now. Managed conservatively during recent hospitalization with gradual diet and monitoring. Recent scopes, upper and lower, unremarkable. Hx of colon ca resection. Recent CT's have been stable, consistent with partial sbo. Recent surgical attempt showed numerous anterior wall adhesions, lysis unable to be attempted.  Since hospital, still having diarrhea but some improvement and only mild abdominal pain at times. Watching diet for exacerbating factors. Notes watermelon possibly recently.  Some dark stools, not new. As noted, scopes without concerning findings recently. Is on iron supplementation.  Notes rhinitis with some scant blood at times. Using Flonase and Zyrtec.  Hx of cervical and lumbar djd. On gabapentin 1200 bid which was reduced to 100 bid in hospital. Notes this has no effect currently. Neck pain more of an issue recently.     Review of Systems   All other systems reviewed and are negative.      Past Medical History:   Diagnosis Date    Abdominal pain     Allergic rhinitis     Arthritis     Blood platelet disorder     Blood platelet disorder     Blood transfusion     during delivery and     Bowel obstruction     Cervical radiculopathy     followed by dr cloud    Colon cancer     transverse colon; resected; Stage IIA (pT3 pN0 MX)    Degenerative joint disease (DJD) of lumbar spine     Diabetes mellitus     Diarrhea     Falls 2020    Family history of breast cancer     Family history of colon cancer     Fatty liver     GERD (gastroesophageal reflux disease)     History of shingles     Hyperlipidemia     Hypothyroidism     Irritable bowel syndrome     Microscopic colitis     treated     Myelodysplastic syndrome     Raynaud phenomenon     Raynaud's disease     Type 2  diabetes mellitus          Current Outpatient Medications:     acetaminophen (TYLENOL) 650 MG TbSR, Take 650 mg by mouth once as needed (pain)., Disp: , Rfl:     ascorbic acid, vitamin C, (VITAMIN C) 1000 MG tablet, Take 1,000 mg by mouth once daily., Disp: , Rfl:     atorvastatin (LIPITOR) 40 MG tablet, TAKE 1 TABLET BY MOUTH  DAILY, Disp: 90 tablet, Rfl: 3    azelastine (ASTELIN) 137 mcg (0.1 %) nasal spray, 2 sprays (274 mcg total) by Nasal route 2 (two) times daily as needed for Rhinitis., Disp: , Rfl:     biotin 10,000 mcg TbDL, Take 1 tablet by mouth once daily. , Disp: , Rfl:     calcium-vitamin D3 (OS-KASSANDRA 500 + D3) 500 mg-5 mcg (200 unit) per tablet, Take 1,200 tablets by mouth once daily., Disp: , Rfl:     cholestyramine (QUESTRAN) 4 gram packet, Take 1 packet (4 g total) by mouth 2 (two) times daily., Disp: 180 packet, Rfl: 3    conjugated estrogens (PREMARIN) vaginal cream, INSERT 1/2 GRAM VAGINALLY  TWICE WEEKLY, Disp: 30 g, Rfl: 3    cranberry extract 200 mg Cap, Take 1 capsule by mouth once daily., Disp: , Rfl:     diclofenac sodium (VOLTAREN) 1 % Gel, Apply 2 g topically 4 (four) times daily as needed (Back pain)., Disp: 350 g, Rfl: 2    donepeziL (ARICEPT) 5 MG tablet, Take 1 tablet (5 mg total) by mouth every evening., Disp: 30 tablet, Rfl: 11    DULoxetine (CYMBALTA) 30 MG capsule, Take 1 capsule (30 mg total) by mouth 2 (two) times daily., Disp: 180 capsule, Rfl: 3    famotidine (PEPCID) 20 MG tablet, Take 1 tablet (20 mg total) by mouth 2 (two) times daily as needed for Heartburn., Disp: 60 tablet, Rfl: 2    ferrous sulfate 324 mg (65 mg iron) TbEC, Take 1 tablet (324 mg total) by mouth once daily., Disp: , Rfl: 0    fluticasone propionate (FLONASE) 50 mcg/actuation nasal spray, 1 spray by Each Nostril route daily as needed for Rhinitis., Disp: , Rfl:     hydrocortisone 2.5 % cream, Apply topically 2 (two) times daily as needed., Disp: 1 each, Rfl: 1    ibuprofen (ADVIL,MOTRIN)  200 MG tablet, Take 1 tablet (200 mg total) by mouth every 6 (six) hours as needed (severe pain , give with tylenol)., Disp: 30 tablet, Rfl: 0    levothyroxine (SYNTHROID) 75 MCG tablet, Take 1 tablet (75 mcg total) by mouth before breakfast., Disp: 30 tablet, Rfl: 11    LIDOcaine (LIDODERM) 5 %, Place 1 patch onto the skin once daily. Remove & Discard patch within 12 hours or as directed by MD, Disp: 30 patch, Rfl: 0    magnesium 250 mg Tab, Take 1,000 mg by mouth once daily., Disp: , Rfl:     metFORMIN (GLUCOPHAGE) 500 MG tablet, TAKE 1 TABLET(500 MG) BY MOUTH DAILY WITH BREAKFAST, Disp: 90 tablet, Rfl: 1    montelukast (SINGULAIR) 10 mg tablet, TAKE 1 TABLET BY MOUTH  DAILY, Disp: 90 tablet, Rfl: 3    omega-3s-dha-epa-fish oil 1,400 mg/5 mL Liqd, Take 1 Dose by mouth once daily at 6am. for 10 days, Disp: , Rfl:     ondansetron (ZOFRAN-ODT) 8 MG TbDL, Take 1 tablet (8 mg total) by mouth every 8 (eight) hours as needed (nausea)., Disp: 30 tablet, Rfl: 0    pantoprazole (PROTONIX) 20 MG tablet, Take 1 tablet (20 mg total) by mouth once daily., Disp: 30 tablet, Rfl: 2    vitamin D 1000 units Tab, Take 1,000 Units by mouth once daily. D3, Disp: , Rfl:     flash glucose scanning reader (FREESTYLE EFREN 14 DAY READER) Misc, Check blood glucose level 3 times daily., Disp: 1 each, Rfl: 0    flash glucose sensor (FREESTYLE EFREN 14 DAY SENSOR) Kit, 1 application by Misc.(Non-Drug; Combo Route) route every 14 (fourteen) days. Disp 30 or 90 day refill, Disp: 6 kit, Rfl: 3    FREESTYLE LITE STRIPS Strp, TEST DAILY AS DIRECTED, Disp: 50 strip, Rfl: 2    gabapentin (NEURONTIN) 300 MG capsule, Take 1 capsule (300 mg total) by mouth 2 (two) times daily as needed (Pain)., Disp: 60 capsule, Rfl: 5    Past Surgical History:   Procedure Laterality Date    ADENOIDECTOMY      APPENDECTOMY      BACK SURGERY      CARPAL TUNNEL RELEASE      bilateral      SECTION      CHOLECYSTECTOMY  1965    COLECTOMY   06/27/2011    Transverse colon resection by Dr. Aguirre    COLONOSCOPY N/A 07/27/2017    Procedure: COLONOSCOPY;  Surgeon: Manjit Alvarez MD;  Location: [facility] (4TH FLR);  Service: Endoscopy;  Laterality: N/A;    COLONOSCOPY N/A 06/26/2019    Procedure: COLONOSCOPY;  Surgeon: Ramiro Jefferson MD;  Location: [facility] (4TH FLR);  Service: Endoscopy;  Laterality: N/A;  PM prep    COLONOSCOPY N/A 5/4/2022    Procedure: COLONOSCOPY;  Surgeon: Ramiro Jefferson MD;  Location: [facility] (2ND FLR);  Service: Endoscopy;  Laterality: N/A;  EGD and colonoscopy in 6-8 weeks from now     states has constipation. -extended Golytely prep    CYSTOSCOPY N/A 11/09/2021    Procedure: CYSTOSCOPY;  Surgeon: Viridiana Valenzuela MD;  Location: [facility] 1ST FLR;  Service: Urology;  Laterality: N/A;    ENDOSCOPIC ULTRASOUND OF UPPER GASTROINTESTINAL TRACT N/A 12/12/2018    Procedure: ULTRASOUND, UPPER GI TRACT, ENDOSCOPIC;  Surgeon: Jose Hess MD;  Location: [facility];  Service: Endoscopy;  Laterality: N/A;    ENDOSCOPIC ULTRASOUND OF UPPER GASTROINTESTINAL TRACT N/A 01/23/2019    Procedure: ULTRASOUND, UPPER GI TRACT, ENDOSCOPIC;  Surgeon: Jose Hess MD;  Location: [facility] (2ND FLR);  Service: Endoscopy;  Laterality: N/A;    ESOPHAGOGASTRODUODENOSCOPY N/A 11/16/2018    Procedure: EGD (ESOPHAGOGASTRODUODENOSCOPY);  Surgeon: Angelo Reynolds MD;  Location: [facility] (2ND FLR);  Service: Endoscopy;  Laterality: N/A;    ESOPHAGOGASTRODUODENOSCOPY N/A 06/26/2019    Procedure: EGD (ESOPHAGOGASTRODUODENOSCOPY);  Surgeon: Ramiro Jefferson MD;  Location: [facility] (4TH FLR);  Service: Endoscopy;  Laterality: N/A;    ESOPHAGOGASTRODUODENOSCOPY N/A 5/4/2022    Procedure: EGD (ESOPHAGOGASTRODUODENOSCOPY);  Surgeon: Ramiro Jefferson MD;  Location: [facility] (2ND FLR);  Service: Endoscopy;  Laterality: N/A;  fully vaccinated-GT    EYE SURGERY      Cataract Removal    FLUOROSCOPIC URODYNAMIC STUDY N/A 11/09/2021     Procedure: URODYNAMIC STUDY, FLUOROSCOPIC;  Surgeon: Viridiana Valenzuela MD;  Location: [facility] OR [address] [place] [address];  Service: Urology;  Laterality: N/A;  90 minutes     HYSTERECTOMY      JOINT REPLACEMENT      posterolateral fusion with autograft bone and Energy mineralized bone matrix  02/01/2013    at [facility] for lumbar spine stenosis    SMALL INTESTINE SURGERY      SPINE SURGERY      TOE SURGERY      TONSILLECTOMY      TRIGGER FINGER RELEASE         Social History     Tobacco Use    Smoking status: Never Smoker    Smokeless tobacco: Never Used   Substance Use Topics    Alcohol use: Not Currently     Alcohol/week: 3.0 standard drinks     Types: 3 Glasses of wine per week     Comment: socially, hardly ever    Drug use: Never         Objective:      Vitals:    06/20/22 1456   BP: 105/77   Pulse: 68   Temp: 98.8 °F (37.1 °C)       Physical Exam  Constitutional:       General: She is not in acute distress.     Appearance: Normal appearance. She is normal weight. She is not ill-appearing, toxic-appearing or diaphoretic.   HENT:      Head: Normocephalic and atraumatic.   Eyes:      Extraocular Movements: Extraocular movements intact.      Conjunctiva/sclera: Conjunctivae normal.   Cardiovascular:      Rate and Rhythm: Normal rate.   Pulmonary:      Effort: Pulmonary effort is normal. No respiratory distress.   Abdominal:      General: Abdomen is flat. Bowel sounds are normal. There is no distension.      Palpations: Abdomen is soft. There is no mass.      Tenderness: There is abdominal tenderness (Mild diffuse). There is no guarding or rebound.   Musculoskeletal:         General: No swelling or deformity. Normal range of motion.      Cervical back: Normal range of motion. No rigidity or tenderness.      Right lower leg: No edema.      Left lower leg: No edema.   Neurological:      General: No focal deficit present.      Mental Status: She is alert and oriented to person, place, and time.      Gait: Gait normal.    Psychiatric:         Mood and Affect: Mood normal.         Behavior: Behavior normal.         Thought Content: Thought content normal.         Judgment: Judgment normal.       Recent Results (from the past 336 hour(s))   CBC auto differential    Collection Time: 06/07/22  2:39 PM   Result Value Ref Range    WBC 6.59 3.90 - 12.70 K/uL    RBC 4.11 4.00 - 5.40 M/uL    Hemoglobin 13.2 12.0 - 16.0 g/dL    Hematocrit 39.3 37.0 - 48.5 %    MCV 96 82 - 98 fL    MCH 32.1 (H) 27.0 - 31.0 pg    MCHC 33.6 32.0 - 36.0 g/dL    RDW 13.5 11.5 - 14.5 %    Platelets 89 (L) 150 - 450 K/uL    MPV 11.1 9.2 - 12.9 fL    Immature Granulocytes 0.2 0.0 - 0.5 %    Gran # (ANC) 4.2 1.8 - 7.7 K/uL    Immature Grans (Abs) 0.01 0.00 - 0.04 K/uL    Lymph # 1.6 1.0 - 4.8 K/uL    Mono # 0.6 0.3 - 1.0 K/uL    Eos # 0.2 0.0 - 0.5 K/uL    Baso # 0.03 0.00 - 0.20 K/uL    nRBC 0 0 /100 WBC    Gran % 62.9 38.0 - 73.0 %    Lymph % 23.5 18.0 - 48.0 %    Mono % 9.4 4.0 - 15.0 %    Eosinophil % 3.5 0.0 - 8.0 %    Basophil % 0.5 0.0 - 1.9 %    Differential Method Automated    Comprehensive metabolic panel    Collection Time: 06/07/22  2:39 PM   Result Value Ref Range    Sodium 133 (L) 136 - 145 mmol/L    Potassium 4.5 3.5 - 5.1 mmol/L    Chloride 103 95 - 110 mmol/L    CO2 20 (L) 23 - 29 mmol/L    Glucose 162 (H) 70 - 110 mg/dL    BUN 12 8 - 23 mg/dL    Creatinine 0.8 0.5 - 1.4 mg/dL    Calcium 10.2 8.7 - 10.5 mg/dL    Total Protein 7.5 6.0 - 8.4 g/dL    Albumin 4.1 3.5 - 5.2 g/dL    Total Bilirubin 1.3 (H) 0.1 - 1.0 mg/dL    Alkaline Phosphatase 70 55 - 135 U/L    AST 34 10 - 40 U/L    ALT 32 10 - 44 U/L    Anion Gap 10 8 - 16 mmol/L    eGFR if African American >60.0 >60 mL/min/1.73 m^2    eGFR if non African American >60.0 >60 mL/min/1.73 m^2   Lipase    Collection Time: 06/07/22  2:39 PM   Result Value Ref Range    Lipase 19 4 - 60 U/L   POCT glucose    Collection Time: 06/08/22  5:39 AM   Result Value Ref Range    POCT Glucose 125 (H) 70 - 110 mg/dL    Magnesium    Collection Time: 06/08/22  8:22 AM   Result Value Ref Range    Magnesium 1.7 1.6 - 2.6 mg/dL   Comprehensive Metabolic Panel    Collection Time: 06/08/22  8:22 AM   Result Value Ref Range    Sodium 141 136 - 145 mmol/L    Potassium 3.8 3.5 - 5.1 mmol/L    Chloride 105 95 - 110 mmol/L    CO2 27 23 - 29 mmol/L    Glucose 120 (H) 70 - 110 mg/dL    BUN 9 8 - 23 mg/dL    Creatinine 0.7 0.5 - 1.4 mg/dL    Calcium 9.7 8.7 - 10.5 mg/dL    Total Protein 6.7 6.0 - 8.4 g/dL    Albumin 3.9 3.5 - 5.2 g/dL    Total Bilirubin 1.6 (H) 0.1 - 1.0 mg/dL    Alkaline Phosphatase 77 55 - 135 U/L    AST 36 10 - 40 U/L    ALT 34 10 - 44 U/L    Anion Gap 9 8 - 16 mmol/L    eGFR if African American >60.0 >60 mL/min/1.73 m^2    eGFR if non African American >60.0 >60 mL/min/1.73 m^2   CBC Auto Differential    Collection Time: 06/08/22  8:22 AM   Result Value Ref Range    WBC 4.26 3.90 - 12.70 K/uL    RBC 4.00 4.00 - 5.40 M/uL    Hemoglobin 12.6 12.0 - 16.0 g/dL    Hematocrit 39.1 37.0 - 48.5 %    MCV 98 82 - 98 fL    MCH 31.5 (H) 27.0 - 31.0 pg    MCHC 32.2 32.0 - 36.0 g/dL    RDW 13.5 11.5 - 14.5 %    Platelets 77 (L) 150 - 450 K/uL    MPV 10.6 9.2 - 12.9 fL    Immature Granulocytes 0.2 0.0 - 0.5 %    Gran # (ANC) 2.3 1.8 - 7.7 K/uL    Immature Grans (Abs) 0.01 0.00 - 0.04 K/uL    Lymph # 1.3 1.0 - 4.8 K/uL    Mono # 0.4 0.3 - 1.0 K/uL    Eos # 0.2 0.0 - 0.5 K/uL    Baso # 0.03 0.00 - 0.20 K/uL    nRBC 0 0 /100 WBC    Gran % 54.7 38.0 - 73.0 %    Lymph % 31.2 18.0 - 48.0 %    Mono % 8.5 4.0 - 15.0 %    Eosinophil % 4.7 0.0 - 8.0 %    Basophil % 0.7 0.0 - 1.9 %    Differential Method Automated    POCT glucose    Collection Time: 06/08/22  6:05 PM   Result Value Ref Range    POCT Glucose 101 70 - 110 mg/dL   Clostridium difficile EIA    Collection Time: 06/08/22  6:25 PM    Specimen: Stool   Result Value Ref Range    C. diff Antigen Negative Negative    C difficile Toxins A+B, EIA Negative Negative   Stool culture    Collection  Time: 06/08/22  6:25 PM    Specimen: Stool   Result Value Ref Range    Stool Culture       No Salmonella,Shigella,Vibrio,Campylobacter,Yersinia isolated.   WBC, Stool    Collection Time: 06/08/22  6:25 PM   Result Value Ref Range    Stool WBC No neutrophils seen No neutrophils seen   E. coli 0157 antigen    Collection Time: 06/08/22  6:25 PM    Specimen: Stool   Result Value Ref Range    Shiga Toxin 1 E.coli Negative     Shiga Toxin 2 E.coli Negative    POCT glucose    Collection Time: 06/08/22 11:40 PM   Result Value Ref Range    POCT Glucose 99 70 - 110 mg/dL   POCT glucose    Collection Time: 06/09/22  6:07 AM   Result Value Ref Range    POCT Glucose 111 (H) 70 - 110 mg/dL   POCT glucose    Collection Time: 06/09/22  7:30 AM   Result Value Ref Range    POCT Glucose 124 (H) 70 - 110 mg/dL   POCT glucose    Collection Time: 06/09/22 12:03 PM   Result Value Ref Range    POCT Glucose 121 (H) 70 - 110 mg/dL   POCT glucose    Collection Time: 06/09/22  3:49 PM   Result Value Ref Range    POCT Glucose 206 (H) 70 - 110 mg/dL   POCT glucose    Collection Time: 06/09/22  6:18 PM   Result Value Ref Range    POCT Glucose 165 (H) 70 - 110 mg/dL   POCT glucose    Collection Time: 06/10/22 12:08 AM   Result Value Ref Range    POCT Glucose 117 (H) 70 - 110 mg/dL   POCT glucose    Collection Time: 06/10/22  6:27 AM   Result Value Ref Range    POCT Glucose 131 (H) 70 - 110 mg/dL   POCT glucose    Collection Time: 06/10/22  4:54 PM   Result Value Ref Range    POCT Glucose 116 (H) 70 - 110 mg/dL   POCT glucose    Collection Time: 06/11/22 12:13 AM   Result Value Ref Range    POCT Glucose 129 (H) 70 - 110 mg/dL   Comprehensive Metabolic Panel    Collection Time: 06/11/22  5:14 AM   Result Value Ref Range    Sodium 140 136 - 145 mmol/L    Potassium 3.6 3.5 - 5.1 mmol/L    Chloride 108 95 - 110 mmol/L    CO2 21 (L) 23 - 29 mmol/L    Glucose 115 (H) 70 - 110 mg/dL    BUN 4 (L) 8 - 23 mg/dL    Creatinine 0.7 0.5 - 1.4 mg/dL    Calcium  9.0 8.7 - 10.5 mg/dL    Total Protein 6.1 6.0 - 8.4 g/dL    Albumin 3.2 (L) 3.5 - 5.2 g/dL    Total Bilirubin 0.9 0.1 - 1.0 mg/dL    Alkaline Phosphatase 68 55 - 135 U/L    AST 22 10 - 40 U/L    ALT 22 10 - 44 U/L    Anion Gap 11 8 - 16 mmol/L    eGFR if African American >60.0 >60 mL/min/1.73 m^2    eGFR if non African American >60.0 >60 mL/min/1.73 m^2   TSH    Collection Time: 06/11/22  5:14 AM   Result Value Ref Range    TSH 4.013 (H) 0.400 - 4.000 uIU/mL   T4, Free    Collection Time: 06/11/22  5:14 AM   Result Value Ref Range    Free T4 1.06 0.71 - 1.51 ng/dL   POCT glucose    Collection Time: 06/11/22  6:01 AM   Result Value Ref Range    POCT Glucose 128 (H) 70 - 110 mg/dL         Assessment/Plan:     1) Hospital f/u, Recurrent partial SBO 2/2 adhesions; Diarrhea  - Continue improvement since d/c with mild diarrhea and improving mild abdominal tenderness. Is monitoring diet to look for potential exacerbators. Eating small meals, soft currently. Will switch her Mag supplement (current 1000 mg daily) to Mag glycinate 500 qd to help reduced diarrhea. Aricept is something we will be watching closely, okay to continue for now.    2) Cervical, Lumbar DJD - On gabapentin 1200 bid prn baseline. Reduced to 100 bid prn in hospital which is helping little. Will increase to 300 bid prn.    3) Rhinitis - Some irritation recently with scant blood while using Flonase and Zyrtec daily. Start am saline nasal spray use prior to Flonase.

## 2022-06-22 ENCOUNTER — PROCEDURE VISIT (OUTPATIENT)
Dept: NEUROLOGY | Facility: CLINIC | Age: 77
End: 2022-06-22
Payer: MEDICARE

## 2022-06-22 DIAGNOSIS — R26.81 GAIT INSTABILITY: ICD-10-CM

## 2022-06-22 DIAGNOSIS — E11.51 TYPE 2 DIABETES MELLITUS WITH DIABETIC PERIPHERAL ANGIOPATHY WITHOUT GANGRENE, WITHOUT LONG-TERM CURRENT USE OF INSULIN: ICD-10-CM

## 2022-06-22 PROCEDURE — 95886 MUSC TEST DONE W/N TEST COMP: CPT | Mod: 26,S$PBB,, | Performed by: PHYSICAL MEDICINE & REHABILITATION

## 2022-06-22 PROCEDURE — 95910 PR NERVE CONDUCTION STUDY; 7-8 STUDIES: ICD-10-PCS | Mod: 26,S$PBB,, | Performed by: PHYSICAL MEDICINE & REHABILITATION

## 2022-06-22 PROCEDURE — 95886 MUSC TEST DONE W/N TEST COMP: CPT | Mod: PBBFAC | Performed by: PHYSICAL MEDICINE & REHABILITATION

## 2022-06-22 PROCEDURE — 95886 PR EMG COMPLETE, W/ NERVE CONDUCTION STUDIES, 5+ MUSCLES: ICD-10-PCS | Mod: 26,S$PBB,, | Performed by: PHYSICAL MEDICINE & REHABILITATION

## 2022-06-22 PROCEDURE — 95910 NRV CNDJ TEST 7-8 STUDIES: CPT | Mod: PBBFAC | Performed by: PHYSICAL MEDICINE & REHABILITATION

## 2022-06-22 PROCEDURE — 95910 NRV CNDJ TEST 7-8 STUDIES: CPT | Mod: 26,S$PBB,, | Performed by: PHYSICAL MEDICINE & REHABILITATION

## 2022-06-22 NOTE — PROCEDURES
Test Date:  2022    Patient: Anayeli Judd : 1945 Physician: Malick Sexton D.O.   ID#:  [id number]: Female Ref. Phys: Gilma Mir DPM     HPI: Anayeli Judd is a 77 y.o.female who presents for NCS/EMG to evaluate for polyneuropathy in diabetic patient with chronic bilateral L>R foot pain and gait instability.        NCV & EMG Findings:   All nerve conduction studies (as indicated in the following tables) were within normal limits.   Needle evaluation of the right Gluteus Medius muscle showed increased insertional activity.   All remaining muscles (as indicated in the following table) showed no evidence of electrical instability.    Impression:  1. There were myotonic discharges isolated to the right gluteus medius muscle.  In isolation to one muscle, this is of limited diagnostic significance.  2. No electrophysiologic evidence of peripheral polyneuropathy    ___________________________  Malick Sexton D.O.        NCS+  Motor Nerve Results      Latency Amplitude F-Lat Segment Distance CV Comment   Site (ms) Norm (mV) Norm (ms)  (cm) (m/s) Norm    Left Fibular (EDB)   Ankle 4.1  < 6.5 3.3  > 1.10         Bel Fib Head 12.7 - 3.2 -  Bel Fib Head-Ankle 35 41  > 39    Right Fibular (EDB)   Ankle 3.4  < 6.5 5.0  > 1.10         Bel Fib Head 11.8 - 4.2 -  Bel Fib Head-Ankle 36 43  > 39    Left Tibial (AHB)   Ankle 5.2  < 6.1 6.6  > 1.10 53.6        Right Tibial (AHB)   Ankle 4.5  < 6.1 7.5  > 1.10 54.5          Sensory Nerve Results      Latency (Peak) Amplitude (P-P) Segment Distance CV Comment   Site (ms) Norm (µV) Norm  (cm) (m/s) Norm    Right Sural   Calf-Lat Mall 3.9  < 4.5 5  > 4 Calf-Lat Mall 14 36  > 35    Left Superficial Fibular   14 cm-Ankle 3.0  < 4.2 9  > 5 14 cm-Ankle 14 47  > 32    Right Superficial Fibular   14 cm-Ankle 3.4  < 4.2 13  > 5 14 cm-Ankle 14 41  > 32      EMG+     Side Muscle Nerve Root Ins Act Fibs Psw Amp Dur Poly Recrt Int Pat Comment   Right Vastus Med Femoral L2-L4  Nml Nml Nml Nml Nml 0 Nml Nml    Right Tib Anterior Fibular,  Deep Fibula... L4-L5 Nml Nml Nml Nml Nml 0 Nml Nml    Right Fib Longus Fibular,  Superficial... L5-S1 Nml Nml Nml Nml Nml 0 Nml Nml    Right Gastroc Tibial S1-S2 Nml Nml Nml Nml Nml 0 Nml Nml    Right Dorsal Interossei ped I Lateral Plantar S2-S3 Nml Nml Nml Nml Nml 0 Nml Nml    Right Gluteus Max Inf Gluteal L5-S2 Nml Nml Nml Nml Nml 0 Nml Nml    Right Gluteus Med Sup Gluteal L5-S1 *Incr Nml Nml Nml Nml 0 Nml Nml myotonic discharges   Right Gastroc LH Tibial S1-S2 Nml Nml Nml Nml Nml 0 Nml Nml    Right Biceps Fem SH Sciatic L5-S1 Nml Nml Nml Nml Nml 0 Nml Nml    Right Lumbo Parasp (Lower) Rami L5-S1 Nml Nml Nml         Left Dorsal Interossei ped I Lateral Plantar S2-S3 Nml Nml Nml Nml Nml 0 Nml Nml    Left Gastroc Tibial S1-S2 Nml Nml Nml Nml Nml 0 Nml Nml    Left Vastus Med Femoral L2-L4 Nml Nml Nml Nml Nml 0 Nml Nml    Left Tib Anterior Fibular,  Deep Fibula... L4-L5 Nml Nml Nml Nml Nml 0 Nml Nml    Left Fib Longus Fibular,  Superficial... L5-S1 Nml Nml Nml Nml Nml 0 Nml Nml            Waveforms:    Motor              F-Wave       Sensory

## 2022-06-26 NOTE — PROGRESS NOTES
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 Judd is a 77 y.o. female who is referred to us for thrombocytopenia and bone marrow biopsy suggestive of MDS.   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 She remained in remission since then. She also has history of multiple SBO s/p ex lap x2, and SBR.   She was noted to have thrombocytopenia on routine labs, and bone marrow biopsy was performed on 08/03/2020 revealing "mildly hypercellular marrow (30-40%) with trilineage hematopoiesis and mild dyserythropoiesis with increased ring sideroblasts (6%), Blasts in marrow were 0.7%  Chromosomal analysis revealed normal karyotype 46,XX with no evidence of acquired clonal abnormality.   Negative FISH results for -5/5q-, -7/7q-, +8, and -20/20q-    NGS reported normal.   She was seen on initial consult visit on 09/08/2020, and giving her  R-IPSS score was 1.5 and risk category of very low. She is placed under surveillance every 3 moths.     She had recurrent hospital admissions for SBO (more than 5 times). She underwent an exploratory laparotomy on 03/04/2022 and the entire abdomen seemed to be locked in.  It was deemed unsafe to proceed with the surgery.   She also saw neurology for mild cognitive impairment and was started on donepezil.    Interval history:    She underwent EGD on 05/04 with normal findings, as well as a colonoscopy that showed diverticulosis. She was admitted twice between 05/27-05/29 and 06/07-06/11 for abdominal pain, and suspected partial SBO. She was managed conservatively both times.   She is understandably feeling overwhelmed with recurrent admissions for the same GI problems.       Past Medical History:   Past Medical History:   Diagnosis Date    " Abdominal pain     Allergic rhinitis     Arthritis     Blood platelet disorder     Blood platelet disorder     Blood transfusion     during delivery and     Bowel obstruction     Cervical radiculopathy     followed by dr cloud    Colon cancer     transverse colon; resected; Stage IIA (pT3 pN0 MX)    Degenerative joint disease (DJD) of lumbar spine     Diabetes mellitus     Diarrhea     Falls 2020    Family history of breast cancer     Family history of colon cancer     Fatty liver     GERD (gastroesophageal reflux disease)     History of shingles     Hyperlipidemia     Hypomagnesemia     Hypothyroidism     Irritable bowel syndrome     Microscopic colitis     treated     Myelodysplastic syndrome     Raynaud phenomenon     Raynaud's disease     Type 2 diabetes mellitus        Past Surgical HIstory:   Past Surgical History:   Procedure Laterality Date    ADENOIDECTOMY      APPENDECTOMY      BACK SURGERY      CARPAL TUNNEL RELEASE      bilateral      SECTION      CHOLECYSTECTOMY  1965    COLECTOMY  2011    Transverse colon resection by Dr. Aguirre    COLONOSCOPY N/A 2017    Procedure: COLONOSCOPY;  Surgeon: Manjit Alvarez MD;  Location: [facility] (4TH FLR);  Service: Endoscopy;  Laterality: N/A;    COLONOSCOPY N/A 2019    Procedure: COLONOSCOPY;  Surgeon: Ramiro Jefferson MD;  Location: [facility] (4TH FLR);  Service: Endoscopy;  Laterality: N/A;  PM prep    COLONOSCOPY N/A 2022    Procedure: COLONOSCOPY;  Surgeon: Ramiro Jefferson MD;  Location: [facility] (2ND FLR);  Service: Endoscopy;  Laterality: N/A;  EGD and colonoscopy in 6-8 weeks from now     states has constipation. -extended Golytely prep    CYSTOSCOPY N/A 2021    Procedure: CYSTOSCOPY;  Surgeon: Viridiana Valenzuela MD;  [facility];  Service: Urology;  Laterality: N/A;    ENDOSCOPIC ULTRASOUND OF UPPER GASTROINTESTINAL TRACT N/A 2018    Procedure:  ULTRASOUND, UPPER GI TRACT, ENDOSCOPIC;  Surgeon: Jose Hess MD;  Location: [facility] ENDO;  Service: Endoscopy;  Laterality: N/A;    ENDOSCOPIC ULTRASOUND OF UPPER GASTROINTESTINAL TRACT N/A 01/23/2019    Procedure: ULTRASOUND, UPPER GI TRACT, ENDOSCOPIC;  Surgeon: Jose Hess MD;  Location: [facility] ENDO (2ND FLR);  Service: Endoscopy;  Laterality: N/A;    ESOPHAGOGASTRODUODENOSCOPY N/A 11/16/2018    Procedure: EGD (ESOPHAGOGASTRODUODENOSCOPY);  Surgeon: Angelo Reynolds MD;  Location: [facility] ENDO (2ND FLR);  Service: Endoscopy;  Laterality: N/A;    ESOPHAGOGASTRODUODENOSCOPY N/A 06/26/2019    Procedure: EGD (ESOPHAGOGASTRODUODENOSCOPY);  Surgeon: Ramiro Jefferson MD;  Location: [facility] (4TH FLR);  Service: Endoscopy;  Laterality: N/A;    ESOPHAGOGASTRODUODENOSCOPY N/A 5/4/2022    Procedure: EGD (ESOPHAGOGASTRODUODENOSCOPY);  Surgeon: Ramiro Jefferson MD;  Location: [facility] (2ND FLR);  Service: Endoscopy;  Laterality: N/A;  fully vaccinated-GT    EYE SURGERY      Cataract Removal    FLUOROSCOPIC URODYNAMIC STUDY N/A 11/09/2021    Procedure: URODYNAMIC STUDY, FLUOROSCOPIC;  Surgeon: Viridiana Valenzuela MD;  Location: [facility] OR 1ST FLR;  Service: Urology;  Laterality: N/A;  90 minutes     HYSTERECTOMY      JOINT REPLACEMENT      posterolateral fusion with autograft bone and Kay mineralized bone matrix  02/01/2013    at [facility] for lumbar spine stenosis    SMALL INTESTINE SURGERY      SPINE SURGERY      TOE SURGERY      TONSILLECTOMY      TRIGGER FINGER RELEASE         Family History:   Family History   Problem Relation Age of Onset    Heart disease Father 50        Mi age 50    Colon cancer Father     Bladder Cancer Mother         non smoker    Cataracts Mother     Glaucoma Mother     Heart disease Mother     Hyperlipidemia Mother     Kidney disease Mother     Breast cancer Sister 79    Arthritis Daughter     Asthma Daughter     Depression Daughter     Hypertension Daughter      Stroke Daughter 40    Arthritis Brother     Colon cancer Brother 70    Alcohol abuse Brother     Parkinsonism Brother     Alcohol abuse Brother     Depression Daughter     Stroke Daughter     Alcohol abuse Brother     Arthritis Brother     Asthma Daughter     Depression Daughter     Celiac disease Neg Hx     Cirrhosis Neg Hx     Colon polyps Neg Hx     Crohn's disease Neg Hx     Cystic fibrosis Neg Hx     Esophageal cancer Neg Hx     Hemochromatosis Neg Hx     Inflammatory bowel disease Neg Hx     Irritable bowel syndrome Neg Hx     Liver cancer Neg Hx     Liver disease Neg Hx     Rectal cancer Neg Hx     Stomach cancer Neg Hx     Ulcerative colitis Neg Hx     Eliazar's disease Neg Hx     Amblyopia Neg Hx     Blindness Neg Hx     Macular degeneration Neg Hx     Retinal detachment Neg Hx     Strabismus Neg Hx     Melanoma Neg Hx        Social History:  reports that she has never smoked. She has never used smokeless tobacco. She reports previous alcohol use of about 3.0 standard drinks of alcohol per week. She reports that she does not use drugs.    Allergies:  Review of patient's allergies indicates:   Allergen Reactions    Codeine Itching and Nausea And Vomiting    Dilaudid [hydromorphone (bulk)] Other (See Comments)     Oversedating, head burning. Pt prefers to avoid.       Percocet [oxycodone-acetaminophen] Itching    Sulfa (sulfonamide antibiotics) Itching and Nausea And Vomiting           Latex, natural rubber Rash       Medications:  Current Outpatient Medications   Medication Sig Dispense Refill    acetaminophen (TYLENOL) 650 MG TbSR Take 650 mg by mouth once as needed (pain).      ascorbic acid, vitamin C, (VITAMIN C) 1000 MG tablet Take 1,000 mg by mouth once daily.      atorvastatin (LIPITOR) 40 MG tablet TAKE 1 TABLET BY MOUTH  DAILY 90 tablet 3    azelastine (ASTELIN) 137 mcg (0.1 %) nasal spray 2 sprays (274 mcg total) by Nasal route 2 (two) times daily as needed  for Rhinitis.      biotin 10,000 mcg TbDL Take 1 tablet by mouth once daily.       calcium-vitamin D3 (OS-KASSANDRA 500 + D3) 500 mg-5 mcg (200 unit) per tablet Take 1,200 tablets by mouth once daily.      cholestyramine (QUESTRAN) 4 gram packet Take 1 packet (4 g total) by mouth 2 (two) times daily. 180 packet 3    conjugated estrogens (PREMARIN) vaginal cream INSERT 1/2 GRAM VAGINALLY  TWICE WEEKLY 30 g 3    cranberry extract 200 mg Cap Take 1 capsule by mouth once daily.      donepeziL (ARICEPT) 5 MG tablet Take 1 tablet (5 mg total) by mouth every evening. 30 tablet 11    DULoxetine (CYMBALTA) 30 MG capsule Take 1 capsule (30 mg total) by mouth 2 (two) times daily. 180 capsule 3    famotidine (PEPCID) 20 MG tablet Take 1 tablet (20 mg total) by mouth 2 (two) times daily as needed for Heartburn. 60 tablet 2    ferrous sulfate 324 mg (65 mg iron) TbEC Take 1 tablet (324 mg total) by mouth once daily.  0    flash glucose scanning reader (FREESTYLE EFREN 14 DAY READER) Misc Check blood glucose level 3 times daily. 1 each 0    flash glucose sensor (FREESTYLE EFREN 14 DAY SENSOR) Kit 1 application by Misc.(Non-Drug; Combo Route) route every 14 (fourteen) days. Disp 30 or 90 day refill 6 kit 3    fluticasone propionate (FLONASE) 50 mcg/actuation nasal spray 1 spray by Each Nostril route daily as needed for Rhinitis.      gabapentin (NEURONTIN) 300 MG capsule Take 1 capsule (300 mg total) by mouth 2 (two) times daily as needed (Pain). 60 capsule 5    levothyroxine (SYNTHROID) 75 MCG tablet Take 1 tablet (75 mcg total) by mouth before breakfast. 30 tablet 11    magnesium 250 mg Tab Take 1,000 mg by mouth once daily.      metFORMIN (GLUCOPHAGE) 500 MG tablet TAKE 1 TABLET(500 MG) BY MOUTH DAILY WITH BREAKFAST 90 tablet 1    montelukast (SINGULAIR) 10 mg tablet TAKE 1 TABLET BY MOUTH  DAILY 90 tablet 3    pantoprazole (PROTONIX) 20 MG tablet Take 1 tablet (20 mg total) by mouth once daily. 30 tablet 2     vitamin D 1000 units Tab Take 1,000 Units by mouth once daily. D3      diclofenac sodium (VOLTAREN) 1 % Gel Apply 2 g topically 4 (four) times daily as needed (Back pain). (Patient not taking: Reported on 6/27/2022) 350 g 2    FREESTYLE LITE STRIPS Strp TEST DAILY AS DIRECTED (Patient not taking: Reported on 6/27/2022) 50 strip 2    hydrocortisone 2.5 % cream Apply topically 2 (two) times daily as needed. (Patient not taking: Reported on 6/27/2022) 1 each 1    ibuprofen (ADVIL,MOTRIN) 200 MG tablet Take 1 tablet (200 mg total) by mouth every 6 (six) hours as needed (severe pain , give with tylenol). (Patient not taking: Reported on 6/27/2022) 30 tablet 0    LIDOcaine (LIDODERM) 5 % Place 1 patch onto the skin once daily. Remove & Discard patch within 12 hours or as directed by MD (Patient not taking: Reported on 6/27/2022) 30 patch 0    ondansetron (ZOFRAN-ODT) 8 MG TbDL Take 1 tablet (8 mg total) by mouth every 8 (eight) hours as needed (nausea). (Patient not taking: Reported on 6/27/2022) 30 tablet 0     No current facility-administered medications for this visit.       Review of Systems   Constitutional: Positive for activity change and fatigue. Negative for appetite change, chills, fever and unexpected weight change.   HENT: Negative for sinus pressure, sinus pain, tinnitus and trouble swallowing.    Eyes: Negative.    Respiratory: Negative for cough, shortness of breath, wheezing and stridor.    Cardiovascular: Negative for chest pain, palpitations and leg swelling.   Gastrointestinal: Negative for abdominal pain, anal bleeding, blood in stool, constipation and diarrhea.   Endocrine: Negative.    Genitourinary: Negative for urgency and vaginal pain.   Musculoskeletal: Negative for back pain, myalgias and neck stiffness.   Skin: Negative.    Allergic/Immunologic: Negative.    Neurological: Negative for syncope, speech difficulty and headaches.   Hematological: Negative.        ECOG Performance Status: 2  "  Objective:      Vitals:   Vitals:    06/27/22 1318   BP: (!) 116/56   BP Location: Left arm   Patient Position: Sitting   BP Method: Medium (Automatic)   Pulse: 64   Resp: 12   Temp: 98.6 °F (37 °C)   TempSrc: Oral   SpO2: 99%   Weight: 54.8 kg (120 lb 13 oz)   Height: 5' 2" (1.575 m)       Physical Exam  Constitutional:       General: She is not in acute distress.     Appearance: She is not ill-appearing.   HENT:      Head: Normocephalic and atraumatic.      Mouth/Throat:      Pharynx: Oropharynx is clear.   Eyes:      Pupils: Pupils are equal, round, and reactive to light.   Cardiovascular:      Rate and Rhythm: Normal rate and regular rhythm.      Heart sounds: No murmur heard.    No friction rub. No gallop.   Pulmonary:      Effort: Pulmonary effort is normal. No respiratory distress.      Breath sounds: Normal breath sounds. No wheezing or rales.   Abdominal:      General: There is no distension.      Palpations: Abdomen is soft. There is no mass.      Tenderness: There is no abdominal tenderness.   Musculoskeletal:         General: No swelling.   Skin:     Coloration: Skin is not jaundiced or pale.      Findings: No rash.   Neurological:      General: No focal deficit present.      Mental Status: She is oriented to person, place, and time.         Laboratory Data:  No visits with results within 1 Week(s) from this visit.   Latest known visit with results is:   Admission on 06/07/2022, Discharged on 06/11/2022   Component Date Value Ref Range Status    WBC 06/07/2022 6.59  3.90 - 12.70 K/uL Final    RBC 06/07/2022 4.11  4.00 - 5.40 M/uL Final    Hemoglobin 06/07/2022 13.2  12.0 - 16.0 g/dL Final    Hematocrit 06/07/2022 39.3  37.0 - 48.5 % Final    MCV 06/07/2022 96  82 - 98 fL Final    MCH 06/07/2022 32.1 (A) 27.0 - 31.0 pg Final    MCHC 06/07/2022 33.6  32.0 - 36.0 g/dL Final    RDW 06/07/2022 13.5  11.5 - 14.5 % Final    Platelets 06/07/2022 89 (A) 150 - 450 K/uL Final    MPV 06/07/2022 11.1  9.2 " - 12.9 fL Final    Immature Granulocytes 06/07/2022 0.2  0.0 - 0.5 % Final    Gran # (ANC) 06/07/2022 4.2  1.8 - 7.7 K/uL Final    Immature Grans (Abs) 06/07/2022 0.01  0.00 - 0.04 K/uL Final    Comment: Mild elevation in immature granulocytes is non specific and   can be seen in a variety of conditions including stress response,   acute inflammation, trauma and pregnancy. Correlation with other   laboratory and clinical findings is essential.      Lymph # 06/07/2022 1.6  1.0 - 4.8 K/uL Final    Mono # 06/07/2022 0.6  0.3 - 1.0 K/uL Final    Eos # 06/07/2022 0.2  0.0 - 0.5 K/uL Final    Baso # 06/07/2022 0.03  0.00 - 0.20 K/uL Final    nRBC 06/07/2022 0  0 /100 WBC Final    Gran % 06/07/2022 62.9  38.0 - 73.0 % Final    Lymph % 06/07/2022 23.5  18.0 - 48.0 % Final    Mono % 06/07/2022 9.4  4.0 - 15.0 % Final    Eosinophil % 06/07/2022 3.5  0.0 - 8.0 % Final    Basophil % 06/07/2022 0.5  0.0 - 1.9 % Final    Differential Method 06/07/2022 Automated   Final    Sodium 06/07/2022 133 (A) 136 - 145 mmol/L Final    Potassium 06/07/2022 4.5  3.5 - 5.1 mmol/L Final    Chloride 06/07/2022 103  95 - 110 mmol/L Final    CO2 06/07/2022 20 (A) 23 - 29 mmol/L Final    Glucose 06/07/2022 162 (A) 70 - 110 mg/dL Final    BUN 06/07/2022 12  8 - 23 mg/dL Final    Creatinine 06/07/2022 0.8  0.5 - 1.4 mg/dL Final    Calcium 06/07/2022 10.2  8.7 - 10.5 mg/dL Final    Total Protein 06/07/2022 7.5  6.0 - 8.4 g/dL Final    Albumin 06/07/2022 4.1  3.5 - 5.2 g/dL Final    Total Bilirubin 06/07/2022 1.3 (A) 0.1 - 1.0 mg/dL Final    Comment: For infants and newborns, interpretation of results should be based  on gestational age, weight and in agreement with clinical  observations.    Premature Infant recommended reference ranges:  Up to 24 hours.............<8.0 mg/dL  Up to 48 hours............<12.0 mg/dL  3-5 days..................<15.0 mg/dL  6-29 days.................<15.0 mg/dL      Alkaline Phosphatase 06/07/2022  70  55 - 135 U/L Final    AST 06/07/2022 34  10 - 40 U/L Final    ALT 06/07/2022 32  10 - 44 U/L Final    Anion Gap 06/07/2022 10  8 - 16 mmol/L Final    eGFR if African American 06/07/2022 >60.0  >60 mL/min/1.73 m^2 Final    eGFR if non African American 06/07/2022 >60.0  >60 mL/min/1.73 m^2 Final    Comment: Calculation used to obtain the estimated glomerular filtration  rate (eGFR) is the CKD-EPI equation.       Lipase 06/07/2022 19  4 - 60 U/L Final    POCT Glucose 06/08/2022 125 (A) 70 - 110 mg/dL Final    Magnesium 06/08/2022 1.7  1.6 - 2.6 mg/dL Final    Sodium 06/08/2022 141  136 - 145 mmol/L Final    Potassium 06/08/2022 3.8  3.5 - 5.1 mmol/L Final    Chloride 06/08/2022 105  95 - 110 mmol/L Final    CO2 06/08/2022 27  23 - 29 mmol/L Final    Glucose 06/08/2022 120 (A) 70 - 110 mg/dL Final    BUN 06/08/2022 9  8 - 23 mg/dL Final    Creatinine 06/08/2022 0.7  0.5 - 1.4 mg/dL Final    Calcium 06/08/2022 9.7  8.7 - 10.5 mg/dL Final    Total Protein 06/08/2022 6.7  6.0 - 8.4 g/dL Final    Albumin 06/08/2022 3.9  3.5 - 5.2 g/dL Final    Total Bilirubin 06/08/2022 1.6 (A) 0.1 - 1.0 mg/dL Final    Comment: For infants and newborns, interpretation of results should be based  on gestational age, weight and in agreement with clinical  observations.    Premature Infant recommended reference ranges:  Up to 24 hours.............<8.0 mg/dL  Up to 48 hours............<12.0 mg/dL  3-5 days..................<15.0 mg/dL  6-29 days.................<15.0 mg/dL      Alkaline Phosphatase 06/08/2022 77  55 - 135 U/L Final    AST 06/08/2022 36  10 - 40 U/L Final    ALT 06/08/2022 34  10 - 44 U/L Final    Anion Gap 06/08/2022 9  8 - 16 mmol/L Final    eGFR if African American 06/08/2022 >60.0  >60 mL/min/1.73 m^2 Final    eGFR if non African American 06/08/2022 >60.0  >60 mL/min/1.73 m^2 Final    Comment: Calculation used to obtain the estimated glomerular filtration  rate (eGFR) is the CKD-EPI equation.        WBC 06/08/2022 4.26  3.90 - 12.70 K/uL Final    RBC 06/08/2022 4.00  4.00 - 5.40 M/uL Final    Hemoglobin 06/08/2022 12.6  12.0 - 16.0 g/dL Final    Hematocrit 06/08/2022 39.1  37.0 - 48.5 % Final    MCV 06/08/2022 98  82 - 98 fL Final    MCH 06/08/2022 31.5 (A) 27.0 - 31.0 pg Final    MCHC 06/08/2022 32.2  32.0 - 36.0 g/dL Final    RDW 06/08/2022 13.5  11.5 - 14.5 % Final    Platelets 06/08/2022 77 (A) 150 - 450 K/uL Final    MPV 06/08/2022 10.6  9.2 - 12.9 fL Final    Immature Granulocytes 06/08/2022 0.2  0.0 - 0.5 % Final    Gran # (ANC) 06/08/2022 2.3  1.8 - 7.7 K/uL Final    Immature Grans (Abs) 06/08/2022 0.01  0.00 - 0.04 K/uL Final    Comment: Mild elevation in immature granulocytes is non specific and   can be seen in a variety of conditions including stress response,   acute inflammation, trauma and pregnancy. Correlation with other   laboratory and clinical findings is essential.      Lymph # 06/08/2022 1.3  1.0 - 4.8 K/uL Final    Mono # 06/08/2022 0.4  0.3 - 1.0 K/uL Final    Eos # 06/08/2022 0.2  0.0 - 0.5 K/uL Final    Baso # 06/08/2022 0.03  0.00 - 0.20 K/uL Final    nRBC 06/08/2022 0  0 /100 WBC Final    Gran % 06/08/2022 54.7  38.0 - 73.0 % Final    Lymph % 06/08/2022 31.2  18.0 - 48.0 % Final    Mono % 06/08/2022 8.5  4.0 - 15.0 % Final    Eosinophil % 06/08/2022 4.7  0.0 - 8.0 % Final    Basophil % 06/08/2022 0.7  0.0 - 1.9 % Final    Differential Method 06/08/2022 Automated   Final    C. diff Antigen 06/08/2022 Negative  Negative Final    C difficile Toxins A+B, EIA 06/08/2022 Negative  Negative Final    Testing not recommended for children <24 months old.    Stool Culture 06/08/2022 No Salmonella,Shigella,Vibrio,Campylobacter,Yersinia isolated.   Final    Stool WBC 06/08/2022 No neutrophils seen  No neutrophils seen Final    POCT Glucose 06/08/2022 101  70 - 110 mg/dL Final    Shiga Toxin 1 E.coli 06/08/2022 Negative   Final    Shiga Toxin 2 E.coli 06/08/2022  Negative   Final    POCT Glucose 06/08/2022 99  70 - 110 mg/dL Final    POCT Glucose 06/09/2022 124 (A) 70 - 110 mg/dL Final    POCT Glucose 06/09/2022 111 (A) 70 - 110 mg/dL Final    POCT Glucose 06/09/2022 121 (A) 70 - 110 mg/dL Final    POCT Glucose 06/09/2022 165 (A) 70 - 110 mg/dL Final    POCT Glucose 06/09/2022 206 (A) 70 - 110 mg/dL Final    POCT Glucose 06/10/2022 117 (A) 70 - 110 mg/dL Final    POCT Glucose 06/10/2022 131 (A) 70 - 110 mg/dL Final    POCT Glucose 06/10/2022 116 (A) 70 - 110 mg/dL Final    Sodium 06/11/2022 140  136 - 145 mmol/L Final    Potassium 06/11/2022 3.6  3.5 - 5.1 mmol/L Final    Chloride 06/11/2022 108  95 - 110 mmol/L Final    CO2 06/11/2022 21 (A) 23 - 29 mmol/L Final    Glucose 06/11/2022 115 (A) 70 - 110 mg/dL Final    BUN 06/11/2022 4 (A) 8 - 23 mg/dL Final    Creatinine 06/11/2022 0.7  0.5 - 1.4 mg/dL Final    Calcium 06/11/2022 9.0  8.7 - 10.5 mg/dL Final    Total Protein 06/11/2022 6.1  6.0 - 8.4 g/dL Final    Albumin 06/11/2022 3.2 (A) 3.5 - 5.2 g/dL Final    Total Bilirubin 06/11/2022 0.9  0.1 - 1.0 mg/dL Final    Comment: For infants and newborns, interpretation of results should be based  on gestational age, weight and in agreement with clinical  observations.    Premature Infant recommended reference ranges:  Up to 24 hours.............<8.0 mg/dL  Up to 48 hours............<12.0 mg/dL  3-5 days..................<15.0 mg/dL  6-29 days.................<15.0 mg/dL      Alkaline Phosphatase 06/11/2022 68  55 - 135 U/L Final    AST 06/11/2022 22  10 - 40 U/L Final    ALT 06/11/2022 22  10 - 44 U/L Final    Anion Gap 06/11/2022 11  8 - 16 mmol/L Final    eGFR if African American 06/11/2022 >60.0  >60 mL/min/1.73 m^2 Final    eGFR if non African American 06/11/2022 >60.0  >60 mL/min/1.73 m^2 Final    Comment: Calculation used to obtain the estimated glomerular filtration  rate (eGFR) is the CKD-EPI equation.       TSH 06/11/2022 4.013 (A) 0.400 -  4.000 uIU/mL Final    POCT Glucose 06/11/2022 129 (A) 70 - 110 mg/dL Final    POCT Glucose 06/11/2022 128 (A) 70 - 110 mg/dL Final    Free T4 06/11/2022 1.06  0.71 - 1.51 ng/dL Final     WBC   Date Value Ref Range Status   06/08/2022 4.26 3.90 - 12.70 K/uL Final     Hemoglobin   Date Value Ref Range Status   06/08/2022 12.6 12.0 - 16.0 g/dL Final     POC Hematocrit   Date Value Ref Range Status   05/28/2022 40 36 - 54 %PCV Final     Hematocrit   Date Value Ref Range Status   06/08/2022 39.1 37.0 - 48.5 % Final     Platelets   Date Value Ref Range Status   06/08/2022 77 (L) 150 - 450 K/uL Final     Gran # (ANC)   Date Value Ref Range Status   06/08/2022 2.3 1.8 - 7.7 K/uL Final     Gran %   Date Value Ref Range Status   06/08/2022 54.7 38.0 - 73.0 % Final     Sodium   Date Value Ref Range Status   06/11/2022 140 136 - 145 mmol/L Final     Potassium   Date Value Ref Range Status   06/11/2022 3.6 3.5 - 5.1 mmol/L Final     BUN   Date Value Ref Range Status   06/11/2022 4 (L) 8 - 23 mg/dL Final           Assessment and plan        1. Myelodysplastic syndrome         Mrs Judd, an 77 y.o. F with a diagnosed of MDS-SLD after presenting with mild thrombocytopenia. On diagnosis, her R-IPSS score was 1.5 and risk category of very low.   She was placed on surveillance, and has rarely required any transfusions. Blood counts noted to fluctuate during period of stress (SBO, surgeries,..etc).   Her most recent CBC shows Hgb 12.6 g.dl, ANC 2300, and PLT 77k/ul.     Her R-IPSS score remains 1.5 with very low risk of progression.     Will continue surveillance every six months with CBC.     Their questions were answered to their satisfaction.         BMT Chart Routing      Follow up with physician 6 months.   Follow up with ANTONETTE    Infusion scheduling note    Injection scheduling note    Labs CBC   Lab interval: every 6 months     Imaging    Pharmacy appointment    Other referrals            The above was staffed and discussed  with Dr. Inder Cabezas MD  [id number]  Hematology/Oncology fellow

## 2022-06-27 ENCOUNTER — OFFICE VISIT (OUTPATIENT)
Dept: HEMATOLOGY/ONCOLOGY | Facility: CLINIC | Age: 77
End: 2022-06-27
Payer: MEDICARE

## 2022-06-27 ENCOUNTER — OUTPATIENT CASE MANAGEMENT (OUTPATIENT)
Dept: ADMINISTRATIVE | Facility: OTHER | Age: 77
End: 2022-06-27
Payer: MEDICARE

## 2022-06-27 VITALS
TEMPERATURE: 99 F | HEART RATE: 64 BPM | BODY MASS INDEX: 22.23 KG/M2 | SYSTOLIC BLOOD PRESSURE: 116 MMHG | RESPIRATION RATE: 12 BRPM | OXYGEN SATURATION: 99 % | DIASTOLIC BLOOD PRESSURE: 56 MMHG | HEIGHT: 62 IN | WEIGHT: 120.81 LBS

## 2022-06-27 DIAGNOSIS — D69.6 THROMBOCYTOPENIA: ICD-10-CM

## 2022-06-27 DIAGNOSIS — D46.9 MYELODYSPLASTIC SYNDROME: Primary | ICD-10-CM

## 2022-06-27 PROCEDURE — 99213 OFFICE O/P EST LOW 20 MIN: CPT | Mod: S$PBB,GC,, | Performed by: STUDENT IN AN ORGANIZED HEALTH CARE EDUCATION/TRAINING PROGRAM

## 2022-06-27 PROCEDURE — 99213 PR OFFICE/OUTPT VISIT, EST, LEVL III, 20-29 MIN: ICD-10-PCS | Mod: S$PBB,GC,, | Performed by: STUDENT IN AN ORGANIZED HEALTH CARE EDUCATION/TRAINING PROGRAM

## 2022-06-27 PROCEDURE — 99215 OFFICE O/P EST HI 40 MIN: CPT | Mod: PBBFAC | Performed by: STUDENT IN AN ORGANIZED HEALTH CARE EDUCATION/TRAINING PROGRAM

## 2022-06-27 PROCEDURE — 99999 PR PBB SHADOW E&M-EST. PATIENT-LVL V: ICD-10-PCS | Mod: PBBFAC,GC,, | Performed by: STUDENT IN AN ORGANIZED HEALTH CARE EDUCATION/TRAINING PROGRAM

## 2022-06-27 PROCEDURE — 99999 PR PBB SHADOW E&M-EST. PATIENT-LVL V: CPT | Mod: PBBFAC,GC,, | Performed by: STUDENT IN AN ORGANIZED HEALTH CARE EDUCATION/TRAINING PROGRAM

## 2022-06-27 NOTE — PROGRESS NOTES
Outpatient Care Management  Plan of Care Follow Up Visit    Patient: Anayeli Judd  MRN: [id number]  Date of Service: 06/27/2022  Completed by: Shanthi Whitaker RN  Referral Date: 03/04/2022  Program:     Reason for Visit   Patient presents with    OPCM Chart Review     6/27/2022    OPCM RN First Follow-Up Attempt     6/27/2022 At MD's office, requested call back tomorrow.    Follow-up     6/28/2022    Update Plan Of Care     6/28/2022       Brief Summary: Patient states she is feeling much better now.  She is still having a little diarrhea but the abdominal pain is better.  She states she knows that some signs and symptoms of SBO are abdominal pain, nausea, vomiting and diarrhea.  She saw her Hem/Onc MD yesterday, Dr Cabezas, who told her that her blood counts are okay and she doesn't need them checked again for 6 months.   Patient states she spoke to a friend of her's, Dr. Marianna Cho, about her diet.  She states that Dr. Cho told her to eat more ground meats than whole meats like steak or chicken breast.  She stated Dr. Cho told her that it will be much easier for it to pass through the strictures than trying to chew the whole meats enough.  Patient states she received educational materials and read it.  She is following the low residue diet.  She eats Oatmeal during the week and eggs and toast on the weekend.  She drinks liquids if she has a colon upset like Glucerna.    CM reiterated that the other signs for SBO, besides what she mentioned above, are can't pass gas or stool or vomiting that looks and smells like stool.  CM also instructed patient that is she has any of these symptoms to call her MD right away.  CM educated patient on self care habits to help with SBO like diet, bowel regimen, water intake, exercise.  CM also educated patient to avoid whole-grain foods, whole fruits and vegetables, tough meats, seeds and nuts.    Next Steps:   Follow up in two week  Follow up on low fiber  (residue) diet  Follow up on signs and symptoms of SBO  Follow up on self care habits (diet, bowel regimen, water intake, exercise)    Follow up in about 3 weeks (around 7/18/2022).    Patient Summary     Involvement of Care:  Do I have permission to speak with other family members about your care?  Yes    Patient Reported Labs & Vitals:  1.  Any Patient Reported Labs & Vitals?  Yes  2.  Patient Reported Blood Pressure:     3.  Patient Reported Pulse:     4.  Patient Reported Weight (Kg):     5.  Patient Reported Blood Glucose (mg/dl):  136    Medical and social history was reviewed with patient and/or caregiver.     Clinical Assessment     Reviewed and provided basic information on available community resources for mental health, transportation, wellness resources, and palliative care programs with patient and/or caregiver.     Complex Care Plan     Care plan was discussed and completed today with input from patient and/or caregiver.    Patient Instructions     Instructions were provided via the [facility] patient resources and are available for the patient to view on the patient portal.    [facility] OPCM Self-Management Care Plan was developed with the patients/caregivers input and was based on identified barriers from todays assessment.  Goals were written today with the patient/caregiver and the patient has agreed to work towards these goals to improve his/her overall well-being. Patient verbalized understanding of the care plan, goals, and all of today's instructions. Encouraged patient/caregiver to communicate with his/her physician and health care team about health conditions and the treatment plan.  Provided my contact information today and encouraged patient/caregiver to call me with any questions as needed.

## 2022-07-06 DIAGNOSIS — Z12.31 ENCOUNTER FOR SCREENING MAMMOGRAM FOR BREAST CANCER: Primary | ICD-10-CM

## 2022-07-18 ENCOUNTER — HOSPITAL ENCOUNTER (OUTPATIENT)
Dept: RADIOLOGY | Facility: HOSPITAL | Age: 77
Discharge: HOME | End: 2022-07-18
Attending: INTERNAL MEDICINE
Payer: MEDICARE

## 2022-07-18 DIAGNOSIS — Z12.31 ENCOUNTER FOR SCREENING MAMMOGRAM FOR BREAST CANCER: ICD-10-CM

## 2022-07-18 PROCEDURE — 77063 MAMMO DIGITAL SCREENING BILAT WITH TOMO: ICD-10-PCS | Mod: 26,,, | Performed by: RADIOLOGY

## 2022-07-18 PROCEDURE — 77067 SCR MAMMO BI INCL CAD: CPT | Mod: 26,,, | Performed by: RADIOLOGY

## 2022-07-18 PROCEDURE — 77063 BREAST TOMOSYNTHESIS BI: CPT | Mod: 26,,, | Performed by: RADIOLOGY

## 2022-07-18 PROCEDURE — 77067 SCR MAMMO BI INCL CAD: CPT | Mod: TC

## 2022-07-18 PROCEDURE — 77063 BREAST TOMOSYNTHESIS BI: CPT | Mod: TC

## 2022-07-18 PROCEDURE — 77067 MAMMO DIGITAL SCREENING BILAT WITH TOMO: ICD-10-PCS | Mod: 26,,, | Performed by: RADIOLOGY

## 2022-07-19 ENCOUNTER — PATIENT MESSAGE (OUTPATIENT)
Dept: INTERNAL MEDICINE | Facility: CLINIC | Age: 77
End: 2022-07-19
Payer: MEDICARE

## 2022-07-19 ENCOUNTER — PATIENT MESSAGE (OUTPATIENT)
Dept: NEUROLOGY | Facility: CLINIC | Age: 77
End: 2022-07-19
Payer: MEDICARE

## 2022-07-19 ENCOUNTER — OUTPATIENT CASE MANAGEMENT (OUTPATIENT)
Dept: ADMINISTRATIVE | Facility: OTHER | Age: 77
End: 2022-07-19
Payer: MEDICARE

## 2022-07-19 DIAGNOSIS — R05.9 COUGH: Primary | ICD-10-CM

## 2022-07-19 RX ORDER — PROMETHAZINE HYDROCHLORIDE AND CODEINE PHOSPHATE 6.25; 1 MG/5ML; MG/5ML
5 SOLUTION ORAL EVERY 6 HOURS PRN
Qty: 118 ML | Refills: 0 | Status: SHIPPED | OUTPATIENT
Start: 2022-07-19 | End: 2022-07-26

## 2022-07-19 NOTE — PROGRESS NOTES
Outpatient Care Management  Plan of Care Follow Up Visit    Patient: Anayeli Judd  MRN: [id number]  Date of Service: 07/19/2022  Completed by: Shanthi Whitaker RN  Referral Date: 03/04/2022  Program:     Reason for Visit   Patient presents with    OPCM Chart Review     7/19/2022    Follow-up     7/19/2022    Update Plan Of Care     7/19/2022    Case Closure     7/19/2022       Brief Summary: Patient states she has been having headaches with low grade fever like 100.4.  Dr Guthrie, her PCP, is aware and just wants to watch it for now.  She is eating a low residue diet.  She was constipated yesterday, took medicine that Dr. Nguyen from GI told her to take and she had a BM this morning.  She will continue to take the medication for the time being.  She states her abdomen is doing fine otherwise.  She went for a mammogram yesterday and also was diagnosed with the beginning of Parkinson't disease.    CM reiterated self care measures for bowel obstruction like low residue diet, exercise, drinking fluids and keeping up with bowel regimen.    Patient Summary     Involvement of Care:  Do I have permission to speak with other family members about your care?  Yes    Patient Reported Labs & Vitals:  1.  Any Patient Reported Labs & Vitals?  No  2.  Patient Reported Blood Pressure:     3.  Patient Reported Pulse:     4.  Patient Reported Weight (Kg):     5.  Patient Reported Blood Glucose (mg/dl):       Medical and social history was reviewed with patient and/or caregiver.     Clinical Assessment     Reviewed and provided basic information on available community resources for mental health, transportation, wellness resources, and palliative care programs with patient and/or caregiver.     Complex Care Plan     Care plan was discussed and completed today with input from patient and/or caregiver.    Patient Instructions     Instructions were provided via the [facility] patient resources and are available for the patient to  view on the patient portal.    Todays OPCM Self-Management Care Plan was developed with the patients/caregivers input and was based on identified barriers from todays assessment.  Goals were written today with the patient/caregiver and the patient has agreed to work towards these goals to improve his/her overall well-being. Patient verbalized understanding of the care plan, goals, and all of today's instructions. Encouraged patient/caregiver to communicate with his/her physician and health care team about health conditions and the treatment plan.  Provided my contact information today and encouraged patient/caregiver to call me with any questions as needed.

## 2022-07-20 RX ORDER — DONEPEZIL HYDROCHLORIDE 10 MG/1
10 TABLET, FILM COATED ORAL NIGHTLY
Qty: 90 TABLET | Refills: 3 | Status: SHIPPED | OUTPATIENT
Start: 2022-07-20 | End: 2023-07-10

## 2022-07-21 ENCOUNTER — CARE AT HOME (OUTPATIENT)
Dept: HOME HEALTH SERVICES | Facility: CLINIC | Age: 77
End: 2022-07-21
Payer: MEDICARE

## 2022-07-21 DIAGNOSIS — E03.9 HYPOTHYROIDISM, UNSPECIFIED TYPE: Chronic | ICD-10-CM

## 2022-07-21 DIAGNOSIS — E78.49 OTHER HYPERLIPIDEMIA: ICD-10-CM

## 2022-07-21 DIAGNOSIS — E11.9 TYPE 2 DIABETES MELLITUS WITHOUT COMPLICATION, WITHOUT LONG-TERM CURRENT USE OF INSULIN: Chronic | ICD-10-CM

## 2022-07-21 DIAGNOSIS — R53.1 WEAKNESS: ICD-10-CM

## 2022-07-21 PROCEDURE — 99350 PR HOME VISIT,ESTAB PATIENT,LEVEL IV: ICD-10-PCS | Mod: S$GLB,,, | Performed by: NURSE PRACTITIONER

## 2022-07-21 PROCEDURE — 99350 HOME/RES VST EST HIGH MDM 60: CPT | Mod: S$GLB,,, | Performed by: NURSE PRACTITIONER

## 2022-07-24 VITALS
TEMPERATURE: 98 F | RESPIRATION RATE: 18 BRPM | SYSTOLIC BLOOD PRESSURE: 100 MMHG | OXYGEN SATURATION: 98 % | HEART RATE: 78 BPM | DIASTOLIC BLOOD PRESSURE: 60 MMHG

## 2022-07-24 PROBLEM — R53.1 WEAKNESS: Status: ACTIVE | Noted: 2022-07-24

## 2022-07-25 ENCOUNTER — TELEPHONE (OUTPATIENT)
Dept: RADIOLOGY | Facility: HOSPITAL | Age: 77
End: 2022-07-25
Payer: MEDICARE

## 2022-07-25 NOTE — TELEPHONE ENCOUNTER
Spoke with patient and explained mammogram findings.Patient expressed understanding of results. Patient scheduled abnormal mammogram follow up appointment at [facility] on 8/1/2022.

## 2022-07-25 NOTE — PROGRESS NOTES
"[facility]  Medical Care Home Visit    Visit Date: 7/21/22  Encounter Provider: Dale Munoz NP  PCP:  Darci Guthrie MD    PRESENTING HISTORY      Patient ID: Anayeli Judd 77 y.o. female.    Consult Requested By:  Dr. Darci Guthrie  Reason for Consult:  Low grade fever, weakness     Chief Complaint: Low grade fever, weakness    The patient is being seen at home due to a physical debility that presents a taxing effort to leave the home, to mitigate high risk of hospital readmission or due to the limited availability of reliable or safe options for transportation to the point of access to the provider. The visit meets the criteria for medical necessity as defined by CMS as "health-care services needed to prevent, diagnose, or treat an illness, injury, condition, disease, or its symptoms and that meet accepted standards of medicine." Prior to treatment on this visit the chart was reviewed and patient consent was obtained.    HPI: Anayeli Judd is a 77 y.o. year old female hx of DM, colon cancer(s/p transverse colectomy), UE DVT(no blood thinners), recurrent episodes of abdominal pain. She follow with GI Dr. Jefferson, PCP Dr. Guthrie, Neurology and Hematology.     Today:  Ms. Anayeli Judd is a 77 y.o. female is being seen and examined at home for medical home visit.  She reports low grade fevers and weakness.  No fever noted on exam, vitals stable.  She took home covid test during my visit which was negative.  She denies abdominal pain, chest pain, chills, nausea, cough, congestion, change in bladder habits, change in bowel habits.   She reports taking meds as prescribed.  She also reports associated weakness. She endorses good appetite, BM pattern, sleep pattern. She has private caregivers in home that assist with ADLs.  She is aware of upcoming scheduled appointments. No other needs identified at this time. Risks of environmental exposure to coronavirus discussed including: social " distancing, hand hygiene, and limiting departures from the home for necessities only.  Reports understanding and willingness to comply.     Attestation: Screening criteria to assess the level of the patient's risk for infection with COVID-19 as recommended by the CDC at the time of the above documented home visit concluded appropriateness to proceed. Universal precautions were maintained at all times, including provider use of >60% alcohol gel hand  immediately prior to entry and upon departing the patient's home as well as cleaning of equipment used in home visit with antibacterial/germicidal disposable wipes.      ________________________________________________________________    Review of Systems   Constitutional: Positive for fever (low grade). Negative for chills.   HENT: Negative for congestion.    Eyes: Negative for visual disturbance.   Respiratory: Negative for chest tightness and shortness of breath.    Cardiovascular: Negative for chest pain, palpitations and leg swelling.   Gastrointestinal: Negative for abdominal pain, constipation, diarrhea, nausea and vomiting.   Genitourinary: Negative for difficulty urinating.   Musculoskeletal: Negative for arthralgias and myalgias.   Skin: Negative for wound.   Neurological: Positive for weakness. Negative for dizziness.   Hematological: Does not bruise/bleed easily.   Psychiatric/Behavioral: Negative for agitation.     Assessments:  · Environmental: 7th floor condo, no steps to enter, adequate lighting and temperature control  · Functional Status: Assistance with ADL's/IADL's, ambulates with assistance of a cane/walker, continent of bowel and bladder  · Safety: Fall Precautions, COVID Precautions/Social Distancing/Mask Use  · Nutritional: Adequate  · Home Health: n/a  · DME/Supplies: RW      PAST HISTORY:     Past Medical History:   Diagnosis Date    Abdominal pain     Allergic rhinitis     Arthritis     Blood platelet disorder     Blood platelet  disorder     Blood transfusion     during delivery and     Bowel obstruction     Cervical radiculopathy     followed by dr cloud    Colon cancer     transverse colon; resected; Stage IIA (pT3 pN0 MX)    Degenerative joint disease (DJD) of lumbar spine     Diabetes mellitus     Diarrhea     Falls 2020    Family history of breast cancer     Family history of colon cancer     Fatty liver     GERD (gastroesophageal reflux disease)     History of shingles     Hyperlipidemia     Hypomagnesemia     Hypothyroidism     Irritable bowel syndrome     Microscopic colitis     treated     Myelodysplastic syndrome     Raynaud phenomenon     Raynaud's disease     Type 2 diabetes mellitus        Past Surgical History:   Procedure Laterality Date    ADENOIDECTOMY      APPENDECTOMY      BACK SURGERY      CARPAL TUNNEL RELEASE      bilateral      SECTION      CHOLECYSTECTOMY  1965    COLECTOMY  2011    Transverse colon resection by Dr. Aguirre    COLONOSCOPY N/A 2017    Procedure: COLONOSCOPY;  Surgeon: Manjit Alvarez MD;  Location: [facility] (4TH FLR);  Service: Endoscopy;  Laterality: N/A;    COLONOSCOPY N/A 2019    Procedure: COLONOSCOPY;  Surgeon: Ramiro Jefferson MD;  Location: [facility] (4TH FLR);  Service: Endoscopy;  Laterality: N/A;  PM prep    COLONOSCOPY N/A 2022    Procedure: COLONOSCOPY;  Surgeon: Ramiro Jefferson MD;  Location: [facility] (2ND FLR);  Service: Endoscopy;  Laterality: N/A;  EGD and colonoscopy in 6-8 weeks from now     states has constipation. -extended Golytely prep    CYSTOSCOPY N/A 2021    Procedure: CYSTOSCOPY;  Surgeon: Viridiana Valenzuela MD;  [facility]: [facility] FLR;  Service: Urology;  Laterality: N/A;    ENDOSCOPIC ULTRASOUND OF UPPER GASTROINTESTINAL TRACT N/A 2018    Procedure: ULTRASOUND, UPPER GI TRACT, ENDOSCOPIC;  Surgeon: Jose Hess MD;  Location: [facility];  Service: Endoscopy;   Laterality: N/A;    ENDOSCOPIC ULTRASOUND OF UPPER GASTROINTESTINAL TRACT N/A 01/23/2019    Procedure: ULTRASOUND, UPPER GI TRACT, ENDOSCOPIC;  Surgeon: Jose Hess MD;  Location: [facility] ENDO (2ND FLR);  Service: Endoscopy;  Laterality: N/A;    ESOPHAGOGASTRODUODENOSCOPY N/A 11/16/2018    Procedure: EGD (ESOPHAGOGASTRODUODENOSCOPY);  Surgeon: Angelo Reynolds MD;  Location: [facility] (2ND FLR);  Service: Endoscopy;  Laterality: N/A;    ESOPHAGOGASTRODUODENOSCOPY N/A 06/26/2019    Procedure: EGD (ESOPHAGOGASTRODUODENOSCOPY);  Surgeon: Ramiro Jefferson MD;  Location: [facility] (4TH FLR);  Service: Endoscopy;  Laterality: N/A;    ESOPHAGOGASTRODUODENOSCOPY N/A 5/4/2022    Procedure: EGD (ESOPHAGOGASTRODUODENOSCOPY);  Surgeon: Ramiro Jefferson MD;  Location: [facility] (2ND FLR);  Service: Endoscopy;  Laterality: N/A;  fully vaccinated-GT    EYE SURGERY      Cataract Removal    FLUOROSCOPIC URODYNAMIC STUDY N/A 11/09/2021    Procedure: URODYNAMIC STUDY, FLUOROSCOPIC;  Surgeon: Viridiana Valenzuela MD;  Location: [facility] OR 1ST FLR;  Service: Urology;  Laterality: N/A;  90 minutes     HYSTERECTOMY      JOINT REPLACEMENT      posterolateral fusion with autograft bone and Eun mineralized bone matrix  02/01/2013    at [facility] for lumbar spine stenosis    SMALL INTESTINE SURGERY      SPINE SURGERY      TOE SURGERY      TONSILLECTOMY      TRIGGER FINGER RELEASE         Family History   Problem Relation Age of Onset    Heart disease Father 50        Mi age 50    Colon cancer Father     Bladder Cancer Mother         non smoker    Cataracts Mother     Glaucoma Mother     Heart disease Mother     Hyperlipidemia Mother     Kidney disease Mother     Breast cancer Sister 79    Arthritis Daughter     Asthma Daughter     Depression Daughter     Hypertension Daughter     Stroke Daughter 40    Arthritis Brother     Colon cancer Brother 70    Alcohol abuse Brother     Parkinsonism Brother     Alcohol  abuse Brother     Depression Daughter     Stroke Daughter     Alcohol abuse Brother     Arthritis Brother     Asthma Daughter     Depression Daughter     Celiac disease Neg Hx     Cirrhosis Neg Hx     Colon polyps Neg Hx     Crohn's disease Neg Hx     Cystic fibrosis Neg Hx     Esophageal cancer Neg Hx     Hemochromatosis Neg Hx     Inflammatory bowel disease Neg Hx     Irritable bowel syndrome Neg Hx     Liver cancer Neg Hx     Liver disease Neg Hx     Rectal cancer Neg Hx     Stomach cancer Neg Hx     Ulcerative colitis Neg Hx     Eliazar's disease Neg Hx     Amblyopia Neg Hx     Blindness Neg Hx     Macular degeneration Neg Hx     Retinal detachment Neg Hx     Strabismus Neg Hx     Melanoma Neg Hx        Social History     Socioeconomic History    Marital status:    Tobacco Use    Smoking status: Never Smoker    Smokeless tobacco: Never Used   Substance and Sexual Activity    Alcohol use: Not Currently     Alcohol/week: 3.0 standard drinks     Types: 3 Glasses of wine per week     Comment: socially, hardly ever    Drug use: Never    Sexual activity: Not Currently   Social History Narrative    , lives alone.  Primary support are her children and friends.     Social Determinants of Health     Financial Resource Strain: Low Risk     Difficulty of Paying Living Expenses: Not hard at all   Food Insecurity: No Food Insecurity    Worried About Running Out of Food in the Last Year: Never true    Ran Out of Food in the Last Year: Never true   Transportation Needs: No Transportation Needs    Lack of Transportation (Medical): No    Lack of Transportation (Non-Medical): No   Physical Activity: Inactive    Days of Exercise per Week: 0 days    Minutes of Exercise per Session: 0 min   Stress: No Stress Concern Present    Feeling of Stress : Not at all   Social Connections: Unknown    Frequency of Communication with Friends and Family: More than three times a week     Frequency of Social Gatherings with Friends and Family: More than three times a week    Marital Status:    Housing Stability: Low Risk     Unable to Pay for Housing in the Last Year: No    Number of Places Lived in the Last Year: 1    Unstable Housing in the Last Year: No       MEDICATIONS & ALLERGIES:     Current Outpatient Medications on File Prior to Visit   Medication Sig Dispense Refill    acetaminophen (TYLENOL) 650 MG TbSR Take 650 mg by mouth once as needed (pain).      ascorbic acid, vitamin C, (VITAMIN C) 1000 MG tablet Take 1,000 mg by mouth once daily.      atorvastatin (LIPITOR) 40 MG tablet TAKE 1 TABLET BY MOUTH  DAILY 90 tablet 3    azelastine (ASTELIN) 137 mcg (0.1 %) nasal spray 2 sprays (274 mcg total) by Nasal route 2 (two) times daily as needed for Rhinitis.      biotin 10,000 mcg TbDL Take 1 tablet by mouth once daily.       calcium-vitamin D3 (OS-KASSANDRA 500 + D3) 500 mg-5 mcg (200 unit) per tablet Take 1,200 tablets by mouth once daily.      cholestyramine (QUESTRAN) 4 gram packet Take 1 packet (4 g total) by mouth 2 (two) times daily. 180 packet 3    conjugated estrogens (PREMARIN) vaginal cream INSERT 1/2 GRAM VAGINALLY  TWICE WEEKLY 30 g 3    cranberry extract 200 mg Cap Take 1 capsule by mouth once daily.      diclofenac sodium (VOLTAREN) 1 % Gel Apply 2 g topically 4 (four) times daily as needed (Back pain). (Patient not taking: Reported on 6/27/2022) 350 g 2    donepeziL (ARICEPT) 10 MG tablet Take 1 tablet (10 mg total) by mouth every evening. 90 tablet 3    DULoxetine (CYMBALTA) 30 MG capsule Take 1 capsule (30 mg total) by mouth 2 (two) times daily. 180 capsule 3    famotidine (PEPCID) 20 MG tablet Take 1 tablet (20 mg total) by mouth 2 (two) times daily as needed for Heartburn. 60 tablet 2    ferrous sulfate 324 mg (65 mg iron) TbEC Take 1 tablet (324 mg total) by mouth once daily.  0    flash glucose scanning reader ([employer] EFREN 14 DAY READER) Misc Check  blood glucose level 3 times daily. 1 each 0    flash glucose sensor (FREESTYLE EFREN 14 DAY SENSOR) Kit 1 application by Misc.(Non-Drug; Combo Route) route every 14 (fourteen) days. Disp 30 or 90 day refill 6 kit 3    fluticasone propionate (FLONASE) 50 mcg/actuation nasal spray 1 spray by Each Nostril route daily as needed for Rhinitis.      FREESTYLE LITE STRIPS Strp TEST DAILY AS DIRECTED (Patient not taking: Reported on 6/27/2022) 50 strip 2    gabapentin (NEURONTIN) 300 MG capsule Take 1 capsule (300 mg total) by mouth 2 (two) times daily as needed (Pain). 60 capsule 5    hydrocortisone 2.5 % cream Apply topically 2 (two) times daily as needed. (Patient not taking: Reported on 6/27/2022) 1 each 1    levothyroxine (SYNTHROID) 75 MCG tablet Take 1 tablet (75 mcg total) by mouth before breakfast. 30 tablet 11    LIDOcaine (LIDODERM) 5 % Place 1 patch onto the skin once daily. Remove & Discard patch within 12 hours or as directed by MD (Patient not taking: Reported on 6/27/2022) 30 patch 0    magnesium 250 mg Tab Take 1,000 mg by mouth once daily.      metFORMIN (GLUCOPHAGE) 500 MG tablet TAKE 1 TABLET(500 MG) BY MOUTH DAILY WITH BREAKFAST 90 tablet 1    montelukast (SINGULAIR) 10 mg tablet TAKE 1 TABLET BY MOUTH  DAILY 90 tablet 3    ondansetron (ZOFRAN-ODT) 8 MG TbDL Take 1 tablet (8 mg total) by mouth every 8 (eight) hours as needed (nausea). (Patient not taking: Reported on 6/27/2022) 30 tablet 0    pantoprazole (PROTONIX) 20 MG tablet Take 1 tablet (20 mg total) by mouth once daily. 30 tablet 2    promethazine-codeine 6.25-10 mg/5 ml (PHENERGAN WITH CODEINE) 6.25-10 mg/5 mL syrup Take 5 mLs by mouth every 6 (six) hours as needed for Cough. 118 mL 0    vitamin D 1000 units Tab Take 1,000 Units by mouth once daily. D3      [DISCONTINUED] hyoscyamine (ANASPAZ,LEVSIN) 0.125 mg Tab TAKE 1 TABLET(125 MCG) BY MOUTH EVERY 8 HOURS AS NEEDED FOR URGENCY 270 tablet 3     No current facility-administered  medications on file prior to visit.        Review of patient's allergies indicates:   Allergen Reactions    Codeine Itching and Nausea And Vomiting    Dilaudid [hydromorphone (bulk)] Other (See Comments)     Oversedating, head burning. Pt prefers to avoid.       Percocet [oxycodone-acetaminophen] Itching    Sulfa (sulfonamide antibiotics) Itching and Nausea And Vomiting           Latex, natural rubber Rash       OBJECTIVE:     Vital Signs:  Vitals:    07/21/22 1400   BP: 100/60   Pulse: 78   Resp: 18   Temp: 98 °F (36.7 °C)     There is no height or weight on file to calculate BMI.       Physical Exam  Constitutional:       Appearance: Normal appearance.   HENT:      Head: Normocephalic and atraumatic.      Nose: No congestion or rhinorrhea.      Mouth/Throat:      Mouth: Mucous membranes are moist.   Cardiovascular:      Rate and Rhythm: Normal rate.   Pulmonary:      Effort: No respiratory distress.      Breath sounds: Normal breath sounds.   Abdominal:      General: Bowel sounds are normal.      Palpations: Abdomen is soft.   Musculoskeletal:      Cervical back: Normal range of motion and neck supple.      Right lower leg: No edema.      Left lower leg: No edema.   Skin:     General: Skin is warm and dry.   Neurological:      Mental Status: She is alert. Mental status is at baseline.   Psychiatric:         Mood and Affect: Mood normal.         Laboratory  Lab Results   Component Value Date    WBC 4.26 06/08/2022    HGB 12.6 06/08/2022    HCT 39.1 06/08/2022    MCV 98 06/08/2022    PLT 77 (L) 06/08/2022     Lab Results   Component Value Date    INR 1.1 02/11/2022    INR 1.1 02/10/2022    INR 1.0 05/14/2021     Lab Results   Component Value Date    HGBA1C 6.9 (H) 04/13/2022     No results for input(s): POCTGLUCOSE in the last 72 hours.       ASSESSMENT & PLAN:     Anayeli was seen today for follow-up.    Diagnoses and all orders for this visit:        Problem List Items Addressed This Visit         Cardiac/Vascular    Other hyperlipidemia     Denies chest pain, SOB  Continue statin               Endocrine    Hypothyroid (Chronic)     Chronic  Continue synthroid            Type 2 diabetes mellitus without complication, without long-term current use of insulin (Chronic)     Controlled  Continue glucose monitoring  Diabetic diet  Continue medication as prescribed               Other    Weakness     No fever noted on exam, vital signs stable   Discussed restarting B12 oral supplement  Also educated on daily exercises  Continue caregiver support                  Instructions:  - [facility] Nurse Practitioner to schedule home follow-up visit with patient in 4-6 weeks or as needed.  - Continue all medications, treatments and therapies as ordered.   - Follow all instructions, recommendations as discussed.  - Maintain Safety Precautions at all times.  - Attend all medical appointments as scheduled.  - For worsening symptoms: call Primary Care Physician or Nurse Practitioner.  - For emergencies, call 911 or immediately report to the nearest emergency room.  - Limit Risks of environmental exposure to coronavirus/COVID-19 as discussed including: social distancing, hand hygiene, and limiting departures from the home for necessities only.        Were controlled substances prescribed?  No      Scheduled Follow-up :  No future appointments.    After Visit Medication List :     Medication List          Accurate as of July 21, 2022 11:59 PM. If you have any questions, ask your nurse or doctor.            CONTINUE taking these medications    acetaminophen 650 MG Tbsr  Commonly known as: TYLENOL     ascorbic acid (vitamin C) 1000 MG tablet  Commonly known as: VITAMIN C     atorvastatin 40 MG tablet  Commonly known as: LIPITOR  TAKE 1 TABLET BY MOUTH  DAILY     azelastine 137 mcg (0.1 %) nasal spray  Commonly known as: ASTELIN  2 sprays (274 mcg total) by Nasal route 2 (two) times daily as needed for Rhinitis.     biotin 10,000 mcg  Tbdl     calcium-vitamin D3 500 mg-5 mcg (200 unit) per tablet  Commonly known as: OS-KASSANDRA 500 + D3     cholestyramine 4 gram packet  Commonly known as: QUESTRAN  Take 1 packet (4 g total) by mouth 2 (two) times daily.     cranberry extract 200 mg Cap     diclofenac sodium 1 % Gel  Commonly known as: VOLTAREN  Apply 2 g topically 4 (four) times daily as needed (Back pain).     donepeziL 10 MG tablet  Commonly known as: ARICEPT  Take 1 tablet (10 mg total) by mouth every evening.     DULoxetine 30 MG capsule  Commonly known as: CYMBALTA  Take 1 capsule (30 mg total) by mouth 2 (two) times daily.     famotidine 20 MG tablet  Commonly known as: PEPCID  Take 1 tablet (20 mg total) by mouth 2 (two) times daily as needed for Heartburn.     ferrous sulfate 324 mg (65 mg iron) Tbec  Take 1 tablet (324 mg total) by mouth once daily.     fluticasone propionate 50 mcg/actuation nasal spray  Commonly known as: FLONASE     FREESTYLE EFREN 14 DAY READER Misc  Generic drug: flash glucose scanning reader  Check blood glucose level 3 times daily.     FREESTYLE EFREN 14 DAY SENSOR Kit  Generic drug: flash glucose sensor  1 application by Misc.(Non-Drug; Combo Route) route every 14 (fourteen) days. Disp 30 or 90 day refill     FREESTYLE LITE STRIPS Strp  Generic drug: blood sugar diagnostic  TEST DAILY AS DIRECTED     gabapentin 300 MG capsule  Commonly known as: NEURONTIN  Take 1 capsule (300 mg total) by mouth 2 (two) times daily as needed (Pain).     hydrocortisone 2.5 % cream  Apply topically 2 (two) times daily as needed.     levothyroxine 75 MCG tablet  Commonly known as: SYNTHROID  Take 1 tablet (75 mcg total) by mouth before breakfast.     LIDOcaine 5 %  Commonly known as: LIDODERM  Place 1 patch onto the skin once daily. Remove & Discard patch within 12 hours or as directed by MD     magnesium 250 mg Tab     metFORMIN 500 MG tablet  Commonly known as: GLUCOPHAGE  TAKE 1 TABLET(500 MG) BY MOUTH DAILY WITH BREAKFAST      montelukast 10 mg tablet  Commonly known as: SINGULAIR  TAKE 1 TABLET BY MOUTH  DAILY     ondansetron 8 MG Tbdl  Commonly known as: ZOFRAN-ODT  Take 1 tablet (8 mg total) by mouth every 8 (eight) hours as needed (nausea).     pantoprazole 20 MG tablet  Commonly known as: PROTONIX  Take 1 tablet (20 mg total) by mouth once daily.     PREMARIN vaginal cream  Generic drug: conjugated estrogens  INSERT 1/2 GRAM VAGINALLY  TWICE WEEKLY     promethazine-codeine 6.25-10 mg/5 ml 6.25-10 mg/5 mL syrup  Commonly known as: PHENERGAN with CODEINE  Take 5 mLs by mouth every 6 (six) hours as needed for Cough.     vitamin D 1000 units Tab  Commonly known as: VITAMIN D3            Patient consent was obtained prior to treatment on this visit.    Attestation: Screening criteria to assess the level of the patient's risk for infection with COVID-19 as recommended by the CDC at the time of the above documented home visit concluded appropriateness to proceed. Universal precautions were maintained at all times, including provider use of >60% alcohol gel hand  immediately prior to entry and upon departing the patient's home as well as cleaning of equipment used in home visit with antibacterial/germicidal disposable wipes.     Signature:       ROBIN Salcido   [facility]    Total Face-to-Face Encounter: 60 minutes with >50% of time spent discussing the care with the patient/family.

## 2022-07-25 NOTE — ASSESSMENT & PLAN NOTE
No fever noted on exam, vital signs stable   Discussed restarting B12 oral supplement  Also educated on daily exercises  Continue caregiver support

## 2022-07-25 NOTE — PATIENT INSTRUCTIONS
Instructions:  - [facility] Nurse Practitioner to schedule home follow-up visit with patient in 4-6 weeks or as needed.  - Continue all medications, treatments and therapies as ordered.   - Follow all instructions, recommendations as discussed.  - Maintain Safety Precautions at all times.  - Attend all medical appointments as scheduled.  - For worsening symptoms: call Primary Care Physician or Nurse Practitioner.  - For emergencies, call 911 or immediately report to the nearest emergency room.  - Limit Risks of environmental exposure to coronavirus/COVID-19 as discussed including: social distancing, hand hygiene, and limiting departures from the home for necessities only.

## 2022-08-01 ENCOUNTER — HOSPITAL ENCOUNTER (OUTPATIENT)
Dept: RADIOLOGY | Facility: HOSPITAL | Age: 77
Discharge: HOME | End: 2022-08-01
Attending: INTERNAL MEDICINE
Payer: MEDICARE

## 2022-08-01 DIAGNOSIS — R92.8 ABNORMAL FINDING ON BREAST IMAGING: ICD-10-CM

## 2022-08-01 PROCEDURE — 77065 MAMMO DIGITAL DIAGNOSTIC RIGHT WITH TOMO: ICD-10-PCS | Mod: 26,RT,, | Performed by: RADIOLOGY

## 2022-08-01 PROCEDURE — 77061 MAMMO DIGITAL DIAGNOSTIC RIGHT WITH TOMO: ICD-10-PCS | Mod: 26,RT,, | Performed by: RADIOLOGY

## 2022-08-01 PROCEDURE — 77061 BREAST TOMOSYNTHESIS UNI: CPT | Mod: 26,RT,, | Performed by: RADIOLOGY

## 2022-08-01 PROCEDURE — 77065 DX MAMMO INCL CAD UNI: CPT | Mod: TC,RT

## 2022-08-01 PROCEDURE — 77065 DX MAMMO INCL CAD UNI: CPT | Mod: 26,RT,, | Performed by: RADIOLOGY

## 2022-08-02 ENCOUNTER — TELEPHONE (OUTPATIENT)
Dept: INTERNAL MEDICINE | Facility: CLINIC | Age: 77
End: 2022-08-02
Payer: MEDICARE

## 2022-08-02 DIAGNOSIS — B02.9 HERPES ZOSTER WITHOUT COMPLICATION: Primary | ICD-10-CM

## 2022-08-02 RX ORDER — VALACYCLOVIR HYDROCHLORIDE 1 G/1
1000 TABLET, FILM COATED ORAL 2 TIMES DAILY
Qty: 14 TABLET | Refills: 0 | Status: SHIPPED | OUTPATIENT
Start: 2022-08-02 | End: 2022-08-24

## 2022-08-03 NOTE — TELEPHONE ENCOUNTER
Phone call with daughter.   Small pimple on right side of face. Pt concerning about possible shingles flair. Discussed coming in to see me in clinic but will start Valtrex now regardless. Does have hx of shingles flairs.

## 2022-08-04 ENCOUNTER — PATIENT MESSAGE (OUTPATIENT)
Dept: INTERNAL MEDICINE | Facility: CLINIC | Age: 77
End: 2022-08-04

## 2022-08-04 ENCOUNTER — OFFICE VISIT (OUTPATIENT)
Dept: INTERNAL MEDICINE | Facility: CLINIC | Age: 77
End: 2022-08-04
Payer: MEDICARE

## 2022-08-04 VITALS — SYSTOLIC BLOOD PRESSURE: 107 MMHG | TEMPERATURE: 98 F | DIASTOLIC BLOOD PRESSURE: 63 MMHG | HEART RATE: 69 BPM

## 2022-08-04 DIAGNOSIS — M47.816 OSTEOARTHRITIS OF LUMBAR SPINE, UNSPECIFIED SPINAL OSTEOARTHRITIS COMPLICATION STATUS: Primary | ICD-10-CM

## 2022-08-04 DIAGNOSIS — G43.809 OTHER MIGRAINE WITHOUT STATUS MIGRAINOSUS, NOT INTRACTABLE: ICD-10-CM

## 2022-08-04 DIAGNOSIS — M79.2 NEUROPATHIC PAIN: ICD-10-CM

## 2022-08-04 PROCEDURE — 99213 OFFICE O/P EST LOW 20 MIN: CPT | Mod: S$PBB,,, | Performed by: INTERNAL MEDICINE

## 2022-08-04 PROCEDURE — 99999 PR PBB SHADOW E&M-EST. PATIENT-LVL IV: CPT | Mod: PBBFAC,,, | Performed by: INTERNAL MEDICINE

## 2022-08-04 PROCEDURE — 99999 PR PBB SHADOW E&M-EST. PATIENT-LVL IV: ICD-10-PCS | Mod: PBBFAC,,, | Performed by: INTERNAL MEDICINE

## 2022-08-04 PROCEDURE — 99213 PR OFFICE/OUTPT VISIT, EST, LEVL III, 20-29 MIN: ICD-10-PCS | Mod: S$PBB,,, | Performed by: INTERNAL MEDICINE

## 2022-08-04 PROCEDURE — 99214 OFFICE O/P EST MOD 30 MIN: CPT | Mod: PBBFAC | Performed by: INTERNAL MEDICINE

## 2022-08-04 RX ORDER — PREGABALIN 75 MG/1
75 CAPSULE ORAL 2 TIMES DAILY
Qty: 60 CAPSULE | Refills: 2 | Status: SHIPPED | OUTPATIENT
Start: 2022-08-04 | End: 2022-08-11

## 2022-08-05 NOTE — PROGRESS NOTES
Subjective:       Patient ID: Anayeli Judd is a 77 y.o. female.    Chief Complaint: Back and leg pains, Rash    HPI:  Recently contacted for pimple-like lesion on right face on concern for recurrence of shingles. No further issues. Lesion resolving. Has started course of Valtrex. No typical shingles pain in the area. No eye or visual issues.  Recently saw an Allergist for possible sinusitis. Was having ha she felt related to this but no felt related by physician. [place] due to migraines. Pt does note light sensitivity with ha's and have been frequent lately. Unremarkable CT head earlier this yr. Using Tylenol prn with little relief.  Low back pain and leg cramping an issue. Baseline had been relatively stable on high dose of gabapentin but still not fully controlled. Since previous hospitalization for partial SBO, gabapentin had been reduced on d/c. We increased to 300 bid but does not seem to be controlling. Seen by Dr. Bailee Reynolds for lumbar DJD in past who did not feel any further interventions would be recommended at time. She has done PT and dry needling in past but does not feel up to this currently.    Review of Systems   Constitutional: Positive for fatigue. Negative for appetite change and fever (Notes low grade temps at times).   HENT: Positive for rhinorrhea. Negative for sinus pressure and sinus pain.    Eyes: Positive for photophobia (With HA's). Negative for pain and visual disturbance.   Gastrointestinal: Negative for abdominal pain, nausea and vomiting.   Genitourinary: Negative for difficulty urinating and dysuria.   Musculoskeletal: Positive for arthralgias, back pain and myalgias.   Skin: Positive for rash. Negative for wound.   Neurological: Positive for headaches. Negative for syncope.       Past Medical History:   Diagnosis Date    Abdominal pain     Allergic rhinitis     Arthritis     Blood platelet disorder     Blood platelet disorder     Blood transfusion     during delivery and      Bowel obstruction     Cervical radiculopathy     followed by dr cloud    Colon cancer     transverse colon; resected; Stage IIA (pT3 pN0 MX)    Degenerative joint disease (DJD) of lumbar spine     Diabetes mellitus     Diarrhea     Falls 2020    Family history of breast cancer     Family history of colon cancer     Fatty liver     GERD (gastroesophageal reflux disease)     History of shingles     Hyperlipidemia     Hypomagnesemia     Hypothyroidism     Irritable bowel syndrome     Microscopic colitis     treated     Myelodysplastic syndrome     Raynaud phenomenon     Raynaud's disease     Type 2 diabetes mellitus          Current Outpatient Medications:     acetaminophen (TYLENOL) 650 MG TbSR, Take 650 mg by mouth once as needed (pain)., Disp: , Rfl:     ascorbic acid, vitamin C, (VITAMIN C) 1000 MG tablet, Take 1,000 mg by mouth once daily., Disp: , Rfl:     atorvastatin (LIPITOR) 40 MG tablet, TAKE 1 TABLET BY MOUTH  DAILY, Disp: 90 tablet, Rfl: 3    azelastine (ASTELIN) 137 mcg (0.1 %) nasal spray, 2 sprays (274 mcg total) by Nasal route 2 (two) times daily as needed for Rhinitis., Disp: , Rfl:     biotin 10,000 mcg TbDL, Take 1 tablet by mouth once daily. , Disp: , Rfl:     calcium-vitamin D3 (OS-KASSANDRA 500 + D3) 500 mg-5 mcg (200 unit) per tablet, Take 1,200 tablets by mouth once daily., Disp: , Rfl:     cholestyramine (QUESTRAN) 4 gram packet, Take 1 packet (4 g total) by mouth 2 (two) times daily., Disp: 180 packet, Rfl: 3    conjugated estrogens (PREMARIN) vaginal cream, INSERT 1/2 GRAM VAGINALLY  TWICE WEEKLY, Disp: 30 g, Rfl: 3    cranberry extract 200 mg Cap, Take 1 capsule by mouth once daily., Disp: , Rfl:     donepeziL (ARICEPT) 10 MG tablet, Take 1 tablet (10 mg total) by mouth every evening., Disp: 90 tablet, Rfl: 3    DULoxetine (CYMBALTA) 30 MG capsule, Take 1 capsule (30 mg total) by mouth 2 (two) times daily., Disp: 180 capsule, Rfl: 3     famotidine (PEPCID) 20 MG tablet, Take 1 tablet (20 mg total) by mouth 2 (two) times daily as needed for Heartburn., Disp: 60 tablet, Rfl: 2    ferrous sulfate 324 mg (65 mg iron) TbEC, Take 1 tablet (324 mg total) by mouth once daily., Disp: , Rfl: 0    flash glucose scanning reader (FREESTYLE EFREN 14 DAY READER) Misc, Check blood glucose level 3 times daily., Disp: 1 each, Rfl: 0    flash glucose sensor (FREESTYLE EFREN 14 DAY SENSOR) Kit, 1 application by Misc.(Non-Drug; Combo Route) route every 14 (fourteen) days. Disp 30 or 90 day refill, Disp: 6 kit, Rfl: 3    fluticasone propionate (FLONASE) 50 mcg/actuation nasal spray, 1 spray by Each Nostril route daily as needed for Rhinitis., Disp: , Rfl:     hydrocortisone 2.5 % cream, Apply topically 2 (two) times daily as needed., Disp: 1 each, Rfl: 1    levothyroxine (SYNTHROID) 75 MCG tablet, Take 1 tablet (75 mcg total) by mouth before breakfast., Disp: 30 tablet, Rfl: 11    LIDOcaine (LIDODERM) 5 %, Place 1 patch onto the skin once daily. Remove & Discard patch within 12 hours or as directed by MD, Disp: 30 patch, Rfl: 0    magnesium 250 mg Tab, Take 1,000 mg by mouth once daily., Disp: , Rfl:     metFORMIN (GLUCOPHAGE) 500 MG tablet, TAKE 1 TABLET(500 MG) BY MOUTH DAILY WITH BREAKFAST, Disp: 90 tablet, Rfl: 1    montelukast (SINGULAIR) 10 mg tablet, TAKE 1 TABLET BY MOUTH  DAILY, Disp: 90 tablet, Rfl: 3    ondansetron (ZOFRAN-ODT) 8 MG TbDL, Take 1 tablet (8 mg total) by mouth every 8 (eight) hours as needed (nausea)., Disp: 30 tablet, Rfl: 0    pantoprazole (PROTONIX) 20 MG tablet, Take 1 tablet (20 mg total) by mouth once daily., Disp: 30 tablet, Rfl: 2    valACYclovir (VALTREX) 1000 MG tablet, Take 1 tablet (1,000 mg total) by mouth 2 (two) times daily. for 7 days, Disp: 14 tablet, Rfl: 0    vitamin D 1000 units Tab, Take 1,000 Units by mouth once daily. D3, Disp: , Rfl:     pregabalin (LYRICA) 75 MG capsule, Take 1 capsule (75 mg total) by  mouth 2 (two) times daily., Disp: 60 capsule, Rfl: 2    Past Surgical History:   Procedure Laterality Date    ADENOIDECTOMY      APPENDECTOMY      BACK SURGERY      CARPAL TUNNEL RELEASE      bilateral      SECTION      CHOLECYSTECTOMY  1965    COLECTOMY  2011    Transverse colon resection by Dr. Aguirre    COLONOSCOPY N/A 2017    Procedure: COLONOSCOPY;  Surgeon: Manjit Alvarez MD;  Location: [facility] (4TH FLR);  Service: Endoscopy;  Laterality: N/A;    COLONOSCOPY N/A 2019    Procedure: COLONOSCOPY;  Surgeon: Ramiro Jefferson MD;  Location: [facility] (4TH FLR);  Service: Endoscopy;  Laterality: N/A;  PM prep    COLONOSCOPY N/A 2022    Procedure: COLONOSCOPY;  Surgeon: Ramiro Jefferson MD;  Location: [facility] (2ND FLR);  Service: Endoscopy;  Laterality: N/A;  EGD and colonoscopy in 6-8 weeks from now     states has constipation. -extended Golytely prep    CYSTOSCOPY N/A 2021    Procedure: CYSTOSCOPY;  Surgeon: Viridiana Valenzuela MD;  Location: [facility] 1ST FLR;  Service: Urology;  Laterality: N/A;    ENDOSCOPIC ULTRASOUND OF UPPER GASTROINTESTINAL TRACT N/A 2018    Procedure: ULTRASOUND, UPPER GI TRACT, ENDOSCOPIC;  Surgeon: Jose Hess MD;  Location: [facility];  Service: Endoscopy;  Laterality: N/A;    ENDOSCOPIC ULTRASOUND OF UPPER GASTROINTESTINAL TRACT N/A 2019    Procedure: ULTRASOUND, UPPER GI TRACT, ENDOSCOPIC;  Surgeon: Jose Hess MD;  Location: [facility] (2ND FLR);  Service: Endoscopy;  Laterality: N/A;    ESOPHAGOGASTRODUODENOSCOPY N/A 2018    Procedure: EGD (ESOPHAGOGASTRODUODENOSCOPY);  Surgeon: Angelo Reynolds MD;  Location: [facility] (2ND FLR);  Service: Endoscopy;  Laterality: N/A;    ESOPHAGOGASTRODUODENOSCOPY N/A 2019    Procedure: EGD (ESOPHAGOGASTRODUODENOSCOPY);  Surgeon: Ramiro Jefferson MD;  Location: [facility] (4TH FLR);  Service: Endoscopy;  Laterality: N/A;    ESOPHAGOGASTRODUODENOSCOPY N/A  5/4/2022    Procedure: EGD (ESOPHAGOGASTRODUODENOSCOPY);  Surgeon: Ramiro Jefferson MD;  Location: [facility] (2ND FLR);  Service: Endoscopy;  Laterality: N/A;  fully vaccinated-GT    EYE SURGERY      Cataract Removal    FLUOROSCOPIC URODYNAMIC STUDY N/A 11/09/2021    Procedure: URODYNAMIC STUDY, FLUOROSCOPIC;  Surgeon: Viridiana Valenzuela MD;  Location: [facility] OR 1ST FLR;  Service: Urology;  Laterality: N/A;  90 minutes     HYSTERECTOMY      JOINT REPLACEMENT      posterolateral fusion with autograft bone and [place] mineralized bone matrix  02/01/2013    at [facility] for lumbar spine stenosis    SMALL INTESTINE SURGERY      SPINE SURGERY      TOE SURGERY      TONSILLECTOMY      TRIGGER FINGER RELEASE         Social History     Tobacco Use    Smoking status: Never Smoker    Smokeless tobacco: Never Used   Substance Use Topics    Alcohol use: Not Currently     Comment: socially, rarely    Drug use: Never         Objective:      Vitals:    08/04/22 1119   BP: 107/63   Pulse: 69   Temp: 98.2 °F (36.8 °C)       Physical Exam  Constitutional:       General: She is not in acute distress.     Appearance: Normal appearance. She is not ill-appearing.   Eyes:      Extraocular Movements: Extraocular movements intact.      Conjunctiva/sclera: Conjunctivae normal.   Cardiovascular:      Rate and Rhythm: Normal rate and regular rhythm.   Pulmonary:      Effort: Pulmonary effort is normal. No respiratory distress.      Breath sounds: Normal breath sounds.   Musculoskeletal:         General: Tenderness (Lumbar) present. No swelling, deformity or signs of injury.      Right lower leg: No edema.      Left lower leg: No edema.   Skin:     Coloration: Skin is not pale.   Neurological:      General: No focal deficit present.      Mental Status: She is alert and oriented to person, place, and time.   Psychiatric:         Mood and Affect: Mood normal.         Behavior: Behavior normal.         Thought Content: Thought content  normal.         Judgment: Judgment normal.           Assessment/Plan:     1) Lumbar DJD, LBP, LE pains  - Switch gabapentin to Lyrica, starting at 75 mg bid. Discussed PT and dry needling which she will consider at later time.    2) Migraines  - Will try to minimize medication changes, possible could get some benefit from Lyrica. Continue Tylenol 1000 twice daily as needed. Will consider Nurtec prn in future (avoiding NSAID's in light of GI issues, suspect she would be sensitive to triptan side effects but may consider if nurtec not covered).    3) Rash  - Pimple-like lesion right face, resolving. Lower suspicion for shingles at this point but reasonable finish out recently started Valtrex course.    Reviewed previous labs. Up to date for most needed checks and difficult to get blood draws for her. Can plan to update basic labs, tsh, a1c in 2 to 3 mths.

## 2022-08-11 ENCOUNTER — TELEPHONE (OUTPATIENT)
Dept: INTERNAL MEDICINE | Facility: CLINIC | Age: 77
End: 2022-08-11
Payer: MEDICARE

## 2022-08-11 DIAGNOSIS — M47.816 OSTEOARTHRITIS OF LUMBAR SPINE, UNSPECIFIED SPINAL OSTEOARTHRITIS COMPLICATION STATUS: Primary | ICD-10-CM

## 2022-08-11 RX ORDER — PREGABALIN 150 MG/1
150 CAPSULE ORAL 2 TIMES DAILY
Qty: 60 CAPSULE | Refills: 2 | Status: SHIPPED | OUTPATIENT
Start: 2022-08-11 | End: 2022-09-16

## 2022-08-11 NOTE — TELEPHONE ENCOUNTER
F/U from previous visit for chronic pains, lumbar DJD. Started Lyrica 75 bid, not much effect yet. Agreed on increasing to 150 bid.

## 2022-08-11 NOTE — TELEPHONE ENCOUNTER
----- Message from Abena Sam MA sent at 8/11/2022 12:58 PM CDT -----  Regarding: FW: Pt Requests Call Back    ----- Message -----  From: Faina Byrne  Sent: 8/11/2022  12:37 PM CDT  To: Jerri Bejarano Staff  Subject: Pt Requests Call Back                            Anayeli Wood's care giver, called asking for Dr. Guthrie to call she or Anayeli back. Anayeli is in a lot of pain in her hip, back, pelvic area, side, and in her legs. Israel's number is [phone] or please call Anayeli at [phone].  Thanks,  Faina

## 2022-08-18 RX ORDER — SUVOREXANT 10 MG/1
1 TABLET, FILM COATED ORAL NIGHTLY PRN
Qty: 30 TABLET | Refills: 1 | Status: SHIPPED | OUTPATIENT
Start: 2022-08-18 | End: 2022-08-18 | Stop reason: SDUPTHER

## 2022-08-18 RX ORDER — SUVOREXANT 10 MG/1
1 TABLET, FILM COATED ORAL NIGHTLY PRN
Qty: 30 TABLET | Refills: 1 | Status: SHIPPED | OUTPATIENT
Start: 2022-08-18 | End: 2022-09-16

## 2022-08-21 ENCOUNTER — PATIENT MESSAGE (OUTPATIENT)
Dept: INTERNAL MEDICINE | Facility: CLINIC | Age: 77
End: 2022-08-21
Payer: MEDICARE

## 2022-08-22 ENCOUNTER — TELEPHONE (OUTPATIENT)
Dept: INTERNAL MEDICINE | Facility: CLINIC | Age: 77
End: 2022-08-22
Payer: MEDICARE

## 2022-08-22 DIAGNOSIS — M47.26 OSTEOARTHRITIS OF SPINE WITH RADICULOPATHY, LUMBAR REGION: Primary | ICD-10-CM

## 2022-08-22 DIAGNOSIS — J31.0 RHINITIS, UNSPECIFIED TYPE: ICD-10-CM

## 2022-08-22 DIAGNOSIS — M54.16 LUMBAR RADICULOPATHY, CHRONIC: ICD-10-CM

## 2022-08-23 NOTE — TELEPHONE ENCOUNTER
Lyrica 150 bid not significantly improving low back and hip pains. Will switch back to Gabapentin at 600 bid prn (on 1200 bid in past). Will update lumbar MRI. Seen by Dr. Rodolfo Morocho in past but no recent f/u and she would like to switch to [facility] for mgmt if needed. Getting MRI to better assess status and determine if Pain Mgmt vs Ortho would be more appropriate at this time. Hip assessment may be needed but do not suspect MRI would change mgmt at this time.

## 2022-08-24 RX ORDER — GABAPENTIN 300 MG/1
600 CAPSULE ORAL 2 TIMES DAILY PRN
Qty: 120 CAPSULE | Refills: 11
Start: 2022-08-24 | End: 2022-10-27

## 2022-08-24 RX ORDER — LEVOCETIRIZINE DIHYDROCHLORIDE 5 MG/1
5 TABLET, FILM COATED ORAL NIGHTLY
Qty: 30 TABLET | Refills: 5 | Status: SHIPPED | OUTPATIENT
Start: 2022-08-24 | End: 2022-11-28

## 2022-09-12 ENCOUNTER — HOSPITAL ENCOUNTER (OUTPATIENT)
Dept: RADIOLOGY | Facility: OTHER | Age: 77
Discharge: HOME | End: 2022-09-12
Attending: INTERNAL MEDICINE
Payer: MEDICARE

## 2022-09-12 DIAGNOSIS — M54.16 LUMBAR RADICULOPATHY, CHRONIC: ICD-10-CM

## 2022-09-12 DIAGNOSIS — M47.26 OSTEOARTHRITIS OF SPINE WITH RADICULOPATHY, LUMBAR REGION: ICD-10-CM

## 2022-09-12 PROCEDURE — 72148 MRI LUMBAR SPINE WITHOUT CONTRAST: ICD-10-PCS | Mod: 26,,, | Performed by: RADIOLOGY

## 2022-09-12 PROCEDURE — 72148 MRI LUMBAR SPINE W/O DYE: CPT | Mod: TC

## 2022-09-12 PROCEDURE — 72148 MRI LUMBAR SPINE W/O DYE: CPT | Mod: 26,,, | Performed by: RADIOLOGY

## 2022-09-16 ENCOUNTER — PATIENT MESSAGE (OUTPATIENT)
Dept: GASTROENTEROLOGY | Facility: CLINIC | Age: 77
End: 2022-09-16
Payer: MEDICARE

## 2022-09-19 RX ORDER — POLYETHYLENE GLYCOL 3350 17 G/17G
17 POWDER, FOR SOLUTION ORAL DAILY PRN
Qty: 1530 G | Refills: 0 | Status: SHIPPED | OUTPATIENT
Start: 2022-09-19 | End: 2022-12-18

## 2022-09-20 ENCOUNTER — TELEPHONE (OUTPATIENT)
Dept: INTERNAL MEDICINE | Facility: CLINIC | Age: 77
End: 2022-09-20
Payer: MEDICARE

## 2022-09-23 ENCOUNTER — OFFICE VISIT (OUTPATIENT)
Dept: INTERNAL MEDICINE | Facility: CLINIC | Age: 77
End: 2022-09-23
Payer: MEDICARE

## 2022-09-23 ENCOUNTER — PATIENT MESSAGE (OUTPATIENT)
Dept: INTERNAL MEDICINE | Facility: CLINIC | Age: 77
End: 2022-09-23

## 2022-09-23 VITALS
HEART RATE: 61 BPM | SYSTOLIC BLOOD PRESSURE: 110 MMHG | DIASTOLIC BLOOD PRESSURE: 65 MMHG | BODY MASS INDEX: 24.31 KG/M2 | WEIGHT: 132.94 LBS

## 2022-09-23 DIAGNOSIS — M54.41 CHRONIC BILATERAL LOW BACK PAIN WITH BILATERAL SCIATICA: ICD-10-CM

## 2022-09-23 DIAGNOSIS — G47.00 INSOMNIA, UNSPECIFIED TYPE: ICD-10-CM

## 2022-09-23 DIAGNOSIS — M47.26 OSTEOARTHRITIS OF SPINE WITH RADICULOPATHY, LUMBAR REGION: Primary | ICD-10-CM

## 2022-09-23 DIAGNOSIS — M54.42 CHRONIC BILATERAL LOW BACK PAIN WITH BILATERAL SCIATICA: ICD-10-CM

## 2022-09-23 DIAGNOSIS — E11.69 TYPE 2 DIABETES MELLITUS WITH OTHER SPECIFIED COMPLICATION, WITHOUT LONG-TERM CURRENT USE OF INSULIN: ICD-10-CM

## 2022-09-23 DIAGNOSIS — E03.4 HYPOTHYROIDISM DUE TO ACQUIRED ATROPHY OF THYROID: ICD-10-CM

## 2022-09-23 DIAGNOSIS — G89.29 CHRONIC BILATERAL LOW BACK PAIN WITH BILATERAL SCIATICA: ICD-10-CM

## 2022-09-23 DIAGNOSIS — D69.6 THROMBOCYTOPENIA: ICD-10-CM

## 2022-09-23 DIAGNOSIS — J34.89 INTERNAL NASAL LESION: ICD-10-CM

## 2022-09-23 PROCEDURE — 99999 PR PBB SHADOW E&M-EST. PATIENT-LVL III: CPT | Mod: PBBFAC,,, | Performed by: INTERNAL MEDICINE

## 2022-09-23 PROCEDURE — 99214 PR OFFICE/OUTPT VISIT, EST, LEVL IV, 30-39 MIN: ICD-10-PCS | Mod: S$PBB,,, | Performed by: INTERNAL MEDICINE

## 2022-09-23 PROCEDURE — 99213 OFFICE O/P EST LOW 20 MIN: CPT | Mod: PBBFAC | Performed by: INTERNAL MEDICINE

## 2022-09-23 PROCEDURE — 99999 PR PBB SHADOW E&M-EST. PATIENT-LVL III: ICD-10-PCS | Mod: PBBFAC,,, | Performed by: INTERNAL MEDICINE

## 2022-09-23 PROCEDURE — 99214 OFFICE O/P EST MOD 30 MIN: CPT | Mod: S$PBB,,, | Performed by: INTERNAL MEDICINE

## 2022-09-23 RX ORDER — TRIAMCINOLONE ACETONIDE 1 MG/G
PASTE DENTAL
COMMUNITY
Start: 2022-08-25 | End: 2023-11-07

## 2022-09-23 RX ORDER — TRAZODONE HYDROCHLORIDE 50 MG/1
50 TABLET ORAL NIGHTLY
Qty: 90 TABLET | Refills: 1 | Status: SHIPPED | OUTPATIENT
Start: 2022-09-23 | End: 2022-10-27

## 2022-09-24 NOTE — PROGRESS NOTES
Subjective:       Patient ID: Anayeli Judd is a 77 y.o. female.    Chief Complaint: Follow Up MRI      HPI:  Persistent low back pains with left > right sciatica for months now. Hx of known lumbar djd with surgery in past. Seen by Dr. Bailee Reynolds years ago. Had RED with his associate as well. Updated lumbar MRI recently consistent with multilevel lumbar DJD with areas of moderate and severe foraminal compression b/l.    Notes right inner nasal sore for several weeks now. Saw ENT. Has been using a steroid ointment and a combo steroid/bactroban ointment with no improvement. Using Astelin spray for rhinitis baseline.    Past Medical History:   Diagnosis Date    Abdominal pain     Allergic rhinitis     Arthritis     Blood platelet disorder     Blood platelet disorder     Blood transfusion     during delivery and     Bowel obstruction     Cervical radiculopathy     followed by dr reynolds    Colon cancer     transverse colon; resected; Stage IIA (pT3 pN0 MX)    Degenerative joint disease (DJD) of lumbar spine     Diabetes mellitus     Diarrhea     Falls 2020    Family history of breast cancer     Family history of colon cancer     Fatty liver     GERD (gastroesophageal reflux disease)     History of shingles     Hyperlipidemia     Hypomagnesemia     Hypothyroidism     Irritable bowel syndrome     Microscopic colitis     treated     Migraine     Myelodysplastic syndrome     Raynaud phenomenon     Raynaud's disease     Type 2 diabetes mellitus          Current Outpatient Medications:     acetaminophen (TYLENOL) 650 MG TbSR, Take 650 mg by mouth once as needed (pain)., Disp: , Rfl:     ascorbic acid, vitamin C, (VITAMIN C) 1000 MG tablet, Take 1,000 mg by mouth once daily., Disp: , Rfl:     azelastine (ASTELIN) 137 mcg (0.1 %) nasal spray, 2 sprays (274 mcg total) by Nasal route 2 (two) times daily as needed for Rhinitis., Disp: , Rfl:     biotin 10,000 mcg TbDL, Take  1 tablet by mouth once daily. , Disp: , Rfl:     calcium-vitamin D3 (OS-KASSANDRA 500 + D3) 500 mg-5 mcg (200 unit) per tablet, Take 1,200 tablets by mouth once daily., Disp: , Rfl:     conjugated estrogens (PREMARIN) vaginal cream, INSERT 1/2 GRAM VAGINALLY  TWICE WEEKLY, Disp: 30 g, Rfl: 3    cranberry extract 200 mg Cap, Take 1 capsule by mouth once daily., Disp: , Rfl:     donepeziL (ARICEPT) 10 MG tablet, Take 1 tablet (10 mg total) by mouth every evening., Disp: 90 tablet, Rfl: 3    DULoxetine (CYMBALTA) 30 MG capsule, Take 1 capsule (30 mg total) by mouth 2 (two) times daily., Disp: 180 capsule, Rfl: 3    ferrous sulfate 324 mg (65 mg iron) TbEC, Take 1 tablet (324 mg total) by mouth once daily., Disp: , Rfl: 0    flash glucose scanning reader (FREESTYLE EFREN 14 DAY READER) Misc, Check blood glucose level 3 times daily., Disp: 1 each, Rfl: 0    flash glucose sensor (FREESTYLE EFREN 14 DAY SENSOR) Kit, 1 application by Misc.(Non-Drug; Combo Route) route every 14 (fourteen) days. Disp 30 or 90 day refill, Disp: 6 kit, Rfl: 3    fluticasone propionate (FLONASE) 50 mcg/actuation nasal spray, 1 spray by Each Nostril route daily as needed for Rhinitis., Disp: , Rfl:     gabapentin (NEURONTIN) 300 MG capsule, Take 2 capsules (600 mg total) by mouth 2 (two) times daily as needed (Back pain)., Disp: 120 capsule, Rfl: 11    hydrocortisone 2.5 % cream, Apply topically 2 (two) times daily as needed., Disp: 1 each, Rfl: 1    levocetirizine (XYZAL) 5 MG tablet, Take 1 tablet (5 mg total) by mouth every evening., Disp: 30 tablet, Rfl: 5    levothyroxine (SYNTHROID) 75 MCG tablet, Take 1 tablet (75 mcg total) by mouth before breakfast., Disp: 30 tablet, Rfl: 11    magnesium glycinate 100 mg Tab, Take 500 mg by mouth once daily at 6am., Disp: , Rfl:     metFORMIN (GLUCOPHAGE) 500 MG tablet, TAKE 1 TABLET(500 MG) BY MOUTH DAILY WITH BREAKFAST, Disp: 90 tablet, Rfl: 1    montelukast (SINGULAIR) 10 mg tablet, TAKE 1  TABLET BY MOUTH  DAILY, Disp: 90 tablet, Rfl: 3    ondansetron (ZOFRAN-ODT) 8 MG TbDL, Take 1 tablet (8 mg total) by mouth every 8 (eight) hours as needed (nausea)., Disp: 30 tablet, Rfl: 0    pantoprazole (PROTONIX) 20 MG tablet, Take 1 tablet (20 mg total) by mouth once daily., Disp: 30 tablet, Rfl: 2    polyethylene glycol (GLYCOLAX) 17 gram/dose powder, Take 17 g by mouth daily as needed (Constipation)., Disp: 1530 g, Rfl: 0    triamcinolone acetonide 0.1% (KENALOG) 0.1 % paste, APPLY TO THE AFFECTED AREA WITH EVERY-TIP THREE TIMES DAILY, Disp: , Rfl:     vitamin D 1000 units Tab, Take 1,000 Units by mouth once daily. D3, Disp: , Rfl:     BACTROBAN NASAL 2 % Oint, 1 g by Nasal route 2 (two) times daily. for 14 days, Disp: 28 g, Rfl: 1    LIDOcaine (LIDODERM) 5 %, Place 1 patch onto the skin once daily. Remove & Discard patch within 12 hours or as directed by MD (Patient not taking: Reported on 2022), Disp: 30 patch, Rfl: 0    traZODone (DESYREL) 50 MG tablet, Take 1 tablet (50 mg total) by mouth every evening. For sleep., Disp: 90 tablet, Rfl: 1    Past Surgical History:   Procedure Laterality Date    ADENOIDECTOMY      APPENDECTOMY      BACK SURGERY      CARPAL TUNNEL RELEASE      bilateral      SECTION      CHOLECYSTECTOMY  1965    COLECTOMY  2011    Transverse colon resection by Dr. Aguirre    COLONOSCOPY N/A 2017    Procedure: COLONOSCOPY;  Surgeon: Manjit Alvarez MD;  Location: [facility] (4TH FLR);  Service: Endoscopy;  Laterality: N/A;    COLONOSCOPY N/A 2019    Procedure: COLONOSCOPY;  Surgeon: Ramiro Jefferson MD;  Location: [facility] (4TH FLR);  Service: Endoscopy;  Laterality: N/A;  PM prep    COLONOSCOPY N/A 2022    Procedure: COLONOSCOPY;  Surgeon: Ramiro Jefferson MD;  Location: [facility] (2ND FLR);  Service: Endoscopy;  Laterality: N/A;  EGD and colonoscopy in 6-8 weeks from now     states has constipation. -extended Golytely prep    CYSTOSCOPY  N/A 11/09/2021    Procedure: CYSTOSCOPY;  Surgeon: Viridiana Valenzuela MD;  Location: [facility] OR 1ST FLR;  Service: Urology;  Laterality: N/A;    ENDOSCOPIC ULTRASOUND OF UPPER GASTROINTESTINAL TRACT N/A 12/12/2018    Procedure: ULTRASOUND, UPPER GI TRACT, ENDOSCOPIC;  Surgeon: Jose Hess MD;  Location: [facility] ENDO;  Service: Endoscopy;  Laterality: N/A;    ENDOSCOPIC ULTRASOUND OF UPPER GASTROINTESTINAL TRACT N/A 01/23/2019    Procedure: ULTRASOUND, UPPER GI TRACT, ENDOSCOPIC;  Surgeon: Jose Hess MD;  Location: [facility] ENDO (2ND FLR);  Service: Endoscopy;  Laterality: N/A;    ESOPHAGOGASTRODUODENOSCOPY N/A 11/16/2018    Procedure: EGD (ESOPHAGOGASTRODUODENOSCOPY);  Surgeon: Angelo Reynolds MD;  Location: [facility] ENDO (2ND FLR);  Service: Endoscopy;  Laterality: N/A;    ESOPHAGOGASTRODUODENOSCOPY N/A 06/26/2019    Procedure: EGD (ESOPHAGOGASTRODUODENOSCOPY);  Surgeon: Ramiro Jefferson MD;  Location: [facility] (4TH FLR);  Service: Endoscopy;  Laterality: N/A;    ESOPHAGOGASTRODUODENOSCOPY N/A 5/4/2022    Procedure: EGD (ESOPHAGOGASTRODUODENOSCOPY);  Surgeon: Ramiro Jefferson MD;  Location: [facility] ENDO (2ND FLR);  Service: Endoscopy;  Laterality: N/A;  fully vaccinated-GT    EYE SURGERY      Cataract Removal    FLUOROSCOPIC URODYNAMIC STUDY N/A 11/09/2021    Procedure: URODYNAMIC STUDY, FLUOROSCOPIC;  Surgeon: Viridiana Valenzuela MD;  Location: [facility] OR 1ST FLR;  Service: Urology;  Laterality: N/A;  90 minutes     HYSTERECTOMY      JOINT REPLACEMENT      posterolateral fusion with autograft bone and [place] mineralized bone matrix  02/01/2013    at [facility] for lumbar spine stenosis    SMALL INTESTINE SURGERY      SPINE SURGERY      TOE SURGERY      TONSILLECTOMY      TRIGGER FINGER RELEASE         Social History     Tobacco Use    Smoking status: Never    Smokeless tobacco: Never   Substance Use Topics    Alcohol use: Not Currently     Comment: socially, rarely    Drug use: Never         Objective:       Vitals:    09/23/22 1253   BP: 110/65   Pulse: 61       Physical Exam  Constitutional:       Appearance: Normal appearance.   HENT:      Head: Normocephalic and atraumatic.      Nose:      Comments: Right inner medial nasal focal area of small amount of bleeding and ulceration.  Pulmonary:      Effort: Pulmonary effort is normal. No respiratory distress.   Abdominal:      General: There is no distension.      Palpations: Abdomen is soft.   Musculoskeletal:         General: Tenderness (Left lower back, moderate) present. No swelling or deformity.      Cervical back: Normal range of motion. No rigidity.      Right lower leg: No edema.      Left lower leg: No edema.   Neurological:      General: No focal deficit present.      Mental Status: She is alert and oriented to person, place, and time.   Psychiatric:         Mood and Affect: Mood normal.         Behavior: Behavior normal.         Thought Content: Thought content normal.         Judgment: Judgment normal.         Assessment/Plan:     1) Lumbar DJD  - Persistent chronic lbp with left > right sciatica. Minimal relief with gabapentin 600 bid currently. Agreed on referral to Pain Mgmt to consider RED.    2) Right medial inner nasal sore  - Stop steroid ointments. Start nasal Bactroban ointment. Stop Astelin spray for now.    Ordering A1c, TSH, CBC, BMP for f/u of T2DM, hypothyroidism, thrombocytopenia.

## 2022-09-27 ENCOUNTER — LAB VISIT (OUTPATIENT)
Dept: LAB | Facility: HOSPITAL | Age: 77
End: 2022-09-27
Attending: INTERNAL MEDICINE
Payer: MEDICARE

## 2022-09-27 DIAGNOSIS — E03.4 HYPOTHYROIDISM DUE TO ACQUIRED ATROPHY OF THYROID: ICD-10-CM

## 2022-09-27 DIAGNOSIS — E11.69 TYPE 2 DIABETES MELLITUS WITH OTHER SPECIFIED COMPLICATION, WITHOUT LONG-TERM CURRENT USE OF INSULIN: ICD-10-CM

## 2022-09-27 DIAGNOSIS — D69.6 THROMBOCYTOPENIA: ICD-10-CM

## 2022-09-27 LAB
ANION GAP SERPL CALC-SCNC: 10 MMOL/L (ref 8–16)
BASOPHILS # BLD AUTO: 0.02 K/UL (ref 0–0.2)
BASOPHILS NFR BLD: 0.5 % (ref 0–1.9)
BUN SERPL-MCNC: 12 MG/DL (ref 8–23)
CALCIUM SERPL-MCNC: 9.7 MG/DL (ref 8.7–10.5)
CHLORIDE SERPL-SCNC: 101 MMOL/L (ref 95–110)
CO2 SERPL-SCNC: 25 MMOL/L (ref 23–29)
CREAT SERPL-MCNC: 0.8 MG/DL (ref 0.5–1.4)
DIFFERENTIAL METHOD: ABNORMAL
EOSINOPHIL # BLD AUTO: 0.2 K/UL (ref 0–0.5)
EOSINOPHIL NFR BLD: 4.7 % (ref 0–8)
ERYTHROCYTE [DISTWIDTH] IN BLOOD BY AUTOMATED COUNT: 13.9 % (ref 11.5–14.5)
EST. GFR  (NO RACE VARIABLE): >60 ML/MIN/1.73 M^2
ESTIMATED AVG GLUCOSE: 154 MG/DL (ref 68–131)
GLUCOSE SERPL-MCNC: 225 MG/DL (ref 70–110)
HBA1C MFR BLD: 7 % (ref 4–5.6)
HCT VFR BLD AUTO: 37.1 % (ref 37–48.5)
HGB BLD-MCNC: 11.7 G/DL (ref 12–16)
IMM GRANULOCYTES # BLD AUTO: 0.01 K/UL (ref 0–0.04)
IMM GRANULOCYTES NFR BLD AUTO: 0.2 % (ref 0–0.5)
LYMPHOCYTES # BLD AUTO: 1 K/UL (ref 1–4.8)
LYMPHOCYTES NFR BLD: 25.1 % (ref 18–48)
MCH RBC QN AUTO: 32.1 PG (ref 27–31)
MCHC RBC AUTO-ENTMCNC: 31.5 G/DL (ref 32–36)
MCV RBC AUTO: 102 FL (ref 82–98)
MONOCYTES # BLD AUTO: 0.3 K/UL (ref 0.3–1)
MONOCYTES NFR BLD: 7.9 % (ref 4–15)
NEUTROPHILS # BLD AUTO: 2.5 K/UL (ref 1.8–7.7)
NEUTROPHILS NFR BLD: 61.6 % (ref 38–73)
NRBC BLD-RTO: 0 /100 WBC
PLATELET # BLD AUTO: 82 K/UL (ref 150–450)
PMV BLD AUTO: 11.1 FL (ref 9.2–12.9)
POTASSIUM SERPL-SCNC: 4.2 MMOL/L (ref 3.5–5.1)
RBC # BLD AUTO: 3.64 M/UL (ref 4–5.4)
SODIUM SERPL-SCNC: 136 MMOL/L (ref 136–145)
T4 FREE SERPL-MCNC: 0.98 NG/DL (ref 0.71–1.51)
TSH SERPL DL<=0.005 MIU/L-ACNC: 0.18 UIU/ML (ref 0.4–4)
WBC # BLD AUTO: 4.07 K/UL (ref 3.9–12.7)

## 2022-09-27 PROCEDURE — 84439 ASSAY OF FREE THYROXINE: CPT | Performed by: INTERNAL MEDICINE

## 2022-09-27 PROCEDURE — 85025 COMPLETE CBC W/AUTO DIFF WBC: CPT | Performed by: INTERNAL MEDICINE

## 2022-09-27 PROCEDURE — 80048 BASIC METABOLIC PNL TOTAL CA: CPT | Performed by: INTERNAL MEDICINE

## 2022-09-27 PROCEDURE — 84443 ASSAY THYROID STIM HORMONE: CPT | Performed by: INTERNAL MEDICINE

## 2022-09-27 PROCEDURE — 83036 HEMOGLOBIN GLYCOSYLATED A1C: CPT | Performed by: INTERNAL MEDICINE

## 2022-09-27 PROCEDURE — 36415 COLL VENOUS BLD VENIPUNCTURE: CPT | Performed by: INTERNAL MEDICINE

## 2022-09-28 ENCOUNTER — PATIENT MESSAGE (OUTPATIENT)
Dept: INTERNAL MEDICINE | Facility: CLINIC | Age: 77
End: 2022-09-28
Payer: MEDICARE

## 2022-09-28 DIAGNOSIS — C44.621 SQUAMOUS CELL CARCINOMA OF UPPER EXTREMITY, UNSPECIFIED LATERALITY: Primary | ICD-10-CM

## 2022-09-28 NOTE — TELEPHONE ENCOUNTER
TSH low on Levo 75 qam. Will start by holding on weekends. Repeat TSH with next set of labs within next 3 mths. Otherwise acceptable DM control, thrombocytopenia moderate but stable.    Recent Results (from the past 168 hour(s))   CBC W/ AUTO DIFFERENTIAL    Collection Time: 09/27/22  1:57 PM   Result Value Ref Range    WBC 4.07 3.90 - 12.70 K/uL    RBC 3.64 (L) 4.00 - 5.40 M/uL    Hemoglobin 11.7 (L) 12.0 - 16.0 g/dL    Hematocrit 37.1 37.0 - 48.5 %     (H) 82 - 98 fL    MCH 32.1 (H) 27.0 - 31.0 pg    MCHC 31.5 (L) 32.0 - 36.0 g/dL    RDW 13.9 11.5 - 14.5 %    Platelets 82 (L) 150 - 450 K/uL    MPV 11.1 9.2 - 12.9 fL    Immature Granulocytes 0.2 0.0 - 0.5 %    Gran # (ANC) 2.5 1.8 - 7.7 K/uL    Immature Grans (Abs) 0.01 0.00 - 0.04 K/uL    Lymph # 1.0 1.0 - 4.8 K/uL    Mono # 0.3 0.3 - 1.0 K/uL    Eos # 0.2 0.0 - 0.5 K/uL    Baso # 0.02 0.00 - 0.20 K/uL    nRBC 0 0 /100 WBC    Gran % 61.6 38.0 - 73.0 %    Lymph % 25.1 18.0 - 48.0 %    Mono % 7.9 4.0 - 15.0 %    Eosinophil % 4.7 0.0 - 8.0 %    Basophil % 0.5 0.0 - 1.9 %    Differential Method Automated    BASIC METABOLIC PANEL    Collection Time: 09/27/22  1:57 PM   Result Value Ref Range    Sodium 136 136 - 145 mmol/L    Potassium 4.2 3.5 - 5.1 mmol/L    Chloride 101 95 - 110 mmol/L    CO2 25 23 - 29 mmol/L    Glucose 225 (H) 70 - 110 mg/dL    BUN 12 8 - 23 mg/dL    Creatinine 0.8 0.5 - 1.4 mg/dL    Calcium 9.7 8.7 - 10.5 mg/dL    Anion Gap 10 8 - 16 mmol/L    eGFR >60.0 >60 mL/min/1.73 m^2   Hemoglobin A1C    Collection Time: 09/27/22  1:57 PM   Result Value Ref Range    Hemoglobin A1C 7.0 (H) 4.0 - 5.6 %    Estimated Avg Glucose 154 (H) 68 - 131 mg/dL   TSH    Collection Time: 09/27/22  1:57 PM   Result Value Ref Range    TSH 0.177 (L) 0.400 - 4.000 uIU/mL   T4, Free    Collection Time: 09/27/22  1:57 PM   Result Value Ref Range    Free T4 0.98 0.71 - 1.51 ng/dL

## 2022-09-29 ENCOUNTER — TELEPHONE (OUTPATIENT)
Dept: DERMATOLOGY | Facility: CLINIC | Age: 77
End: 2022-09-29
Payer: MEDICARE

## 2022-09-29 NOTE — TELEPHONE ENCOUNTER
NP (ref from Jaylan) scheduled for same-day Mohs on 11/16 at 12:30pm. Over-the-phone consult completed. Pt confirmed date, time, and location. NP appt information sent in the mail.

## 2022-10-03 ENCOUNTER — TELEPHONE (OUTPATIENT)
Dept: SPINE | Facility: CLINIC | Age: 77
End: 2022-10-03
Payer: MEDICARE

## 2022-10-03 NOTE — TELEPHONE ENCOUNTER
This message is for patient in regards to his/her appointment 10/4/22 at 3:00p  With Toni Castellanos.        [facility] Policy: For the safety of all patients and staff members.   During this visit we're informing all patients and visitors to wear face mask at all time here at [facility].  If you have any questions or concerns please contact [phone]       also inquired IPM within message.    [facility] providers and Mid-levels offer interventional, procedure--based options to treat chronic pain. The goal is to manage chronic pain by reducing pain frequency and intensity and address your functional goals for activities of daily living while simultaneously reducing or eliminating your reliance on medications. Please bring any records or images that you have from prior treatments for your pain. You will be presented with multi-modal treatment plan that may or may not include imaging, interventional procedures, physical/occupational/aqua therapy, pain creams, and non-narcotic pain medications used for the treatments of chronic pain.

## 2022-10-04 ENCOUNTER — HOSPITAL ENCOUNTER (OUTPATIENT)
Dept: RADIOLOGY | Facility: OTHER | Age: 77
Discharge: HOME | End: 2022-10-04
Attending: ANESTHESIOLOGY
Payer: MEDICARE

## 2022-10-04 ENCOUNTER — OFFICE VISIT (OUTPATIENT)
Dept: SPINE | Facility: CLINIC | Age: 77
End: 2022-10-04
Attending: ANESTHESIOLOGY
Payer: MEDICARE

## 2022-10-04 ENCOUNTER — TELEPHONE (OUTPATIENT)
Dept: INTERNAL MEDICINE | Facility: CLINIC | Age: 77
End: 2022-10-04
Payer: MEDICARE

## 2022-10-04 VITALS
HEART RATE: 84 BPM | RESPIRATION RATE: 18 BRPM | WEIGHT: 132.25 LBS | BODY MASS INDEX: 24.34 KG/M2 | SYSTOLIC BLOOD PRESSURE: 120 MMHG | OXYGEN SATURATION: 100 % | DIASTOLIC BLOOD PRESSURE: 59 MMHG | HEIGHT: 62 IN

## 2022-10-04 DIAGNOSIS — I73.9 CLAUDICATION: Primary | ICD-10-CM

## 2022-10-04 DIAGNOSIS — M47.26 OSTEOARTHRITIS OF SPINE WITH RADICULOPATHY, LUMBAR REGION: ICD-10-CM

## 2022-10-04 DIAGNOSIS — G89.29 CHRONIC BILATERAL LOW BACK PAIN WITH BILATERAL SCIATICA: ICD-10-CM

## 2022-10-04 DIAGNOSIS — M54.42 CHRONIC BILATERAL LOW BACK PAIN WITH BILATERAL SCIATICA: ICD-10-CM

## 2022-10-04 DIAGNOSIS — R82.998 DARK URINE: Primary | ICD-10-CM

## 2022-10-04 DIAGNOSIS — M43.10 SPONDYLOLISTHESIS, UNSPECIFIED SPINAL REGION: ICD-10-CM

## 2022-10-04 DIAGNOSIS — M54.41 CHRONIC BILATERAL LOW BACK PAIN WITH BILATERAL SCIATICA: ICD-10-CM

## 2022-10-04 DIAGNOSIS — I73.9 CLAUDICATION: ICD-10-CM

## 2022-10-04 DIAGNOSIS — N39.0 URINARY TRACT INFECTION WITHOUT HEMATURIA, SITE UNSPECIFIED: ICD-10-CM

## 2022-10-04 PROCEDURE — 99205 PR OFFICE/OUTPT VISIT, NEW, LEVL V, 60-74 MIN: ICD-10-PCS | Mod: S$PBB,GC,, | Performed by: ANESTHESIOLOGY

## 2022-10-04 PROCEDURE — 99215 OFFICE O/P EST HI 40 MIN: CPT | Mod: PBBFAC | Performed by: ANESTHESIOLOGY

## 2022-10-04 PROCEDURE — 72114 X-RAY EXAM L-S SPINE BENDING: CPT | Mod: 26,,, | Performed by: RADIOLOGY

## 2022-10-04 PROCEDURE — 99205 OFFICE O/P NEW HI 60 MIN: CPT | Mod: S$PBB,GC,, | Performed by: ANESTHESIOLOGY

## 2022-10-04 PROCEDURE — 99999 PR PBB SHADOW E&M-EST. PATIENT-LVL V: ICD-10-PCS | Mod: PBBFAC,,, | Performed by: ANESTHESIOLOGY

## 2022-10-04 PROCEDURE — 72114 XR LUMBAR SPINE 5 VIEW WITH FLEX AND EXT: ICD-10-PCS | Mod: 26,,, | Performed by: RADIOLOGY

## 2022-10-04 PROCEDURE — 99999 PR PBB SHADOW E&M-EST. PATIENT-LVL V: CPT | Mod: PBBFAC,,, | Performed by: ANESTHESIOLOGY

## 2022-10-04 PROCEDURE — 72114 X-RAY EXAM L-S SPINE BENDING: CPT | Mod: TC,FY

## 2022-10-04 NOTE — TELEPHONE ENCOUNTER
Phone call. Dark urine recently. Going to [facility] facility for back today. She will drop off urine sample, placing order for UA with reflex to culture.    ADDENDUM: UA positive, starting 1 wk course of Ceftin.

## 2022-10-04 NOTE — PROGRESS NOTES
Subjective:      Patient ID: Anayeli Judd is a 77 y.o. female.    Chief Complaint: Low-back Pain (F/u)    Referred by: Darci Guthrie MD     78 y/o F presents with longstanding low back pain. Pain is described as crushing and shooting down bilateral legs to feet. It is worse with prolonged walking and improves with laying down and bending forward. Patient denies any numbness or weakness. Has a history of SCC of skin. She reports overflow incontinence, denies bowel incontinence, denies night sweats, weight loss. She had a lumbar fusion 15 years ago at , unsure of the levels. She also had RED injections 10 years ago with no relief. She takes gabapentin and tylenol at home. Stopped PT due to COVID and has not continued.      Interventional Pain History  RED around  - 0% relief    Past Medical History:   Diagnosis Date    Abdominal pain     Allergic rhinitis     Arthritis     Blood platelet disorder     Blood platelet disorder     Blood transfusion     during delivery and     Bowel obstruction     Cervical radiculopathy     followed by dr cloud    Colon cancer     transverse colon; resected; Stage IIA (pT3 pN0 MX)    Degenerative joint disease (DJD) of lumbar spine     Diabetes mellitus     Diarrhea     Falls 2020    Family history of breast cancer     Family history of colon cancer     Fatty liver     GERD (gastroesophageal reflux disease)     History of shingles     Hyperlipidemia     Hypomagnesemia     Hypothyroidism     Irritable bowel syndrome     Microscopic colitis     treated     Migraine     Myelodysplastic syndrome     Raynaud phenomenon     Raynaud's disease     Type 2 diabetes mellitus        Past Surgical History:   Procedure Laterality Date    ADENOIDECTOMY      APPENDECTOMY      BACK SURGERY      CARPAL TUNNEL RELEASE      bilateral      SECTION      CHOLECYSTECTOMY  1965    COLECTOMY  2011    Transverse colon resection by Dr. Aguirre    COLONOSCOPY N/A  07/27/2017    Procedure: COLONOSCOPY;  Surgeon: Manjit Alvarez MD;  Location: [facility] (4TH FLR);  Service: Endoscopy;  Laterality: N/A;    COLONOSCOPY N/A 06/26/2019    Procedure: COLONOSCOPY;  Surgeon: Ramiro Jefferson MD;  Location: [facility] (4TH FLR);  Service: Endoscopy;  Laterality: N/A;  PM prep    COLONOSCOPY N/A 5/4/2022    Procedure: COLONOSCOPY;  Surgeon: Ramiro Jefferson MD;  Location: [facility] (2ND FLR);  Service: Endoscopy;  Laterality: N/A;  EGD and colonoscopy in 6-8 weeks from now     states has constipation. -extended Golytely prep    CYSTOSCOPY N/A 11/09/2021    Procedure: CYSTOSCOPY;  Surgeon: Viridiana Valenzuela MD;  Location: [facility] OR 1ST FLR;  Service: Urology;  Laterality: N/A;    ENDOSCOPIC ULTRASOUND OF UPPER GASTROINTESTINAL TRACT N/A 12/12/2018    Procedure: ULTRASOUND, UPPER GI TRACT, ENDOSCOPIC;  Surgeon: Jose Hess MD;  Location: [facility];  Service: Endoscopy;  Laterality: N/A;    ENDOSCOPIC ULTRASOUND OF UPPER GASTROINTESTINAL TRACT N/A 01/23/2019    Procedure: ULTRASOUND, UPPER GI TRACT, ENDOSCOPIC;  Surgeon: Jose Hess MD;  Location: [facility] (2ND FLR);  Service: Endoscopy;  Laterality: N/A;    ESOPHAGOGASTRODUODENOSCOPY N/A 11/16/2018    Procedure: EGD (ESOPHAGOGASTRODUODENOSCOPY);  Surgeon: Angelo Reynolds MD;  Location: [facility] (2ND FLR);  Service: Endoscopy;  Laterality: N/A;    ESOPHAGOGASTRODUODENOSCOPY N/A 06/26/2019    Procedure: EGD (ESOPHAGOGASTRODUODENOSCOPY);  Surgeon: Ramiro Jefferson MD;  Location: [facility] (4TH FLR);  Service: Endoscopy;  Laterality: N/A;    ESOPHAGOGASTRODUODENOSCOPY N/A 5/4/2022    Procedure: EGD (ESOPHAGOGASTRODUODENOSCOPY);  Surgeon: Ramiro Jefferson MD;  Location: [facility] (2ND FLR);  Service: Endoscopy;  Laterality: N/A;  fully vaccinated-GT    EYE SURGERY      Cataract Removal    FLUOROSCOPIC URODYNAMIC STUDY N/A 11/09/2021    Procedure: URODYNAMIC STUDY, FLUOROSCOPIC;  Surgeon: Viridiana Valenzuela MD;  Location:  [facility] OR 1ST FLR;  Service: Urology;  Laterality: N/A;  90 minutes     HYSTERECTOMY      JOINT REPLACEMENT      posterolateral fusion with autograft bone and Garrison mineralized bone matrix  02/01/2013    at [facility] for lumbar spine stenosis    SMALL INTESTINE SURGERY      SPINE SURGERY      TOE SURGERY      TONSILLECTOMY      TRIGGER FINGER RELEASE         Review of patient's allergies indicates:   Allergen Reactions    Codeine Itching and Nausea And Vomiting    Dilaudid [hydromorphone (bulk)] Other (See Comments)     Oversedating, head burning. Pt prefers to avoid.       Percocet [oxycodone-acetaminophen] Itching    Sulfa (sulfonamide antibiotics) Itching and Nausea And Vomiting           Latex, natural rubber Rash       Current Outpatient Medications   Medication Sig Dispense Refill    acetaminophen (TYLENOL) 650 MG TbSR Take 650 mg by mouth once as needed (pain).      ascorbic acid, vitamin C, (VITAMIN C) 1000 MG tablet Take 1,000 mg by mouth once daily.      atorvastatin (LIPITOR) 40 MG tablet TAKE 1 TABLET BY MOUTH  DAILY 90 tablet 3    BACTROBAN NASAL 2 % Oint 1 g by Nasal route 2 (two) times daily. for 14 days 28 g 1    biotin 10,000 mcg TbDL Take 1 tablet by mouth once daily.       calcium-vitamin D3 (OS-KASSANDRA 500 + D3) 500 mg-5 mcg (200 unit) per tablet Take 1,200 tablets by mouth once daily.      conjugated estrogens (PREMARIN) vaginal cream INSERT 1/2 GRAM VAGINALLY  TWICE WEEKLY 30 g 3    cranberry extract 200 mg Cap Take 1 capsule by mouth once daily.      donepeziL (ARICEPT) 10 MG tablet Take 1 tablet (10 mg total) by mouth every evening. 90 tablet 3    DULoxetine (CYMBALTA) 30 MG capsule Take 1 capsule (30 mg total) by mouth 2 (two) times daily. 180 capsule 3    ferrous sulfate 324 mg (65 mg iron) TbEC Take 1 tablet (324 mg total) by mouth once daily.  0    flash glucose scanning reader ([employer] EFREN 14 DAY READER) Misc Check blood glucose level 3 times daily. 1 each 0    flash glucose sensor (FREESTYLE  EFREN 14 DAY SENSOR) Kit 1 application by Misc.(Non-Drug; Combo Route) route every 14 (fourteen) days. Disp 30 or 90 day refill 6 kit 3    fluticasone propionate (FLONASE) 50 mcg/actuation nasal spray 1 spray by Each Nostril route daily as needed for Rhinitis.      gabapentin (NEURONTIN) 300 MG capsule Take 2 capsules (600 mg total) by mouth 2 (two) times daily as needed (Back pain). 120 capsule 11    hydrocortisone 2.5 % cream Apply topically 2 (two) times daily as needed. 1 each 1    levocetirizine (XYZAL) 5 MG tablet Take 1 tablet (5 mg total) by mouth every evening. 30 tablet 5    levothyroxine (SYNTHROID) 75 MCG tablet Take 1 tablet (75 mcg total) by mouth before breakfast. 30 tablet 11    LIDOcaine (LIDODERM) 5 % Place 1 patch onto the skin once daily. Remove & Discard patch within 12 hours or as directed by MD 30 patch 0    magnesium glycinate 100 mg Tab Take 500 mg by mouth once daily at 6am.      metFORMIN (GLUCOPHAGE) 500 MG tablet TAKE 1 TABLET(500 MG) BY MOUTH DAILY WITH BREAKFAST 90 tablet 1    montelukast (SINGULAIR) 10 mg tablet TAKE 1 TABLET BY MOUTH  DAILY 90 tablet 3    ondansetron (ZOFRAN-ODT) 8 MG TbDL Take 1 tablet (8 mg total) by mouth every 8 (eight) hours as needed (nausea). 30 tablet 0    pantoprazole (PROTONIX) 20 MG tablet Take 1 tablet (20 mg total) by mouth once daily. 30 tablet 2    polyethylene glycol (GLYCOLAX) 17 gram/dose powder Take 17 g by mouth daily as needed (Constipation). 1530 g 0    traZODone (DESYREL) 50 MG tablet Take 1 tablet (50 mg total) by mouth every evening. For sleep. 90 tablet 1    triamcinolone acetonide 0.1% (KENALOG) 0.1 % paste APPLY TO THE AFFECTED AREA WITH EVERY-TIP THREE TIMES DAILY      vitamin D 1000 units Tab Take 1,000 Units by mouth once daily. D3      azelastine (ASTELIN) 137 mcg (0.1 %) nasal spray 2 sprays (274 mcg total) by Nasal route 2 (two) times daily as needed for Rhinitis. (Patient not taking: Reported on 10/4/2022)       No current  facility-administered medications for this visit.       Family History   Problem Relation Age of Onset    Heart disease Father 50        Mi age 50    Colon cancer Father     Bladder Cancer Mother         non smoker    Cataracts Mother     Glaucoma Mother     Heart disease Mother     Hyperlipidemia Mother     Kidney disease Mother     Breast cancer Sister 79    Arthritis Daughter     Asthma Daughter     Depression Daughter     Hypertension Daughter     Stroke Daughter 40    Arthritis Brother     Colon cancer Brother 70    Alcohol abuse Brother     Parkinsonism Brother     Alcohol abuse Brother     Depression Daughter     Stroke Daughter     Alcohol abuse Brother     Arthritis Brother     Asthma Daughter     Depression Daughter     Celiac disease Neg Hx     Cirrhosis Neg Hx     Colon polyps Neg Hx     Crohn's disease Neg Hx     Cystic fibrosis Neg Hx     Esophageal cancer Neg Hx     Hemochromatosis Neg Hx     Inflammatory bowel disease Neg Hx     Irritable bowel syndrome Neg Hx     Liver cancer Neg Hx     Liver disease Neg Hx     Rectal cancer Neg Hx     Stomach cancer Neg Hx     Ulcerative colitis Neg Hx     Eliazar's disease Neg Hx     Amblyopia Neg Hx     Blindness Neg Hx     Macular degeneration Neg Hx     Retinal detachment Neg Hx     Strabismus Neg Hx     Melanoma Neg Hx        Social History     Socioeconomic History    Marital status:    Tobacco Use    Smoking status: Never    Smokeless tobacco: Never   Substance and Sexual Activity    Alcohol use: Not Currently     Comment: socially, rarely    Drug use: Never    Sexual activity: Not Currently   Social History Narrative    , lives alone.  Primary support are her children and friends.     Social Determinants of Health     Financial Resource Strain: Low Risk     Difficulty of Paying Living Expenses: Not hard at all   Food Insecurity: No Food Insecurity    Worried About Running Out of Food in the Last Year: Never true    Ran Out of Food in the Last  Year: Never true   Transportation Needs: No Transportation Needs    Lack of Transportation (Medical): No    Lack of Transportation (Non-Medical): No   Physical Activity: Inactive    Days of Exercise per Week: 0 days    Minutes of Exercise per Session: 0 min   Stress: No Stress Concern Present    Feeling of Stress : Not at all   Social Connections: Unknown    Frequency of Communication with Friends and Family: More than three times a week    Frequency of Social Gatherings with Friends and Family: More than three times a week    Marital Status:    Housing Stability: Low Risk     Unable to Pay for Housing in the Last Year: No    Number of Places Lived in the Last Year: 1    Unstable Housing in the Last Year: No           Review of Systems   Constitutional: Negative for chills, decreased appetite, fever, night sweats, weight gain and weight loss.   HENT:  Negative for hoarse voice and tinnitus.    Eyes:  Negative for double vision, photophobia and visual disturbance.   Cardiovascular:  Negative for chest pain, dyspnea on exertion and near-syncope.   Respiratory:  Negative for cough, hemoptysis and shortness of breath.    Endocrine: Negative for cold intolerance, polydipsia and polyuria.   Hematologic/Lymphatic: Negative for adenopathy. Does not bruise/bleed easily.   Skin:  Negative for color change, rash and suspicious lesions.   Musculoskeletal:  Positive for arthritis, back pain, muscle cramps and stiffness. Negative for joint pain, muscle weakness, myalgias and neck pain.   Gastrointestinal:  Positive for constipation. Negative for abdominal pain, change in bowel habit, bowel incontinence, melena and nausea.   Genitourinary:  Positive for bladder incontinence and incomplete emptying. Negative for dysuria, flank pain, nocturia, pelvic pain and urgency.   Neurological:  Negative for disturbances in coordination, focal weakness, headaches, loss of balance, numbness, paresthesias and sensory change.  "  Psychiatric/Behavioral:  Negative for altered mental status, depression and substance abuse.    Allergic/Immunologic: Positive for environmental allergies. Negative for persistent infections.   All other systems reviewed and are negative.        Objective:   BP (!) 120/59   Pulse 84   Resp 18   Ht 5' 2" (1.575 m)   Wt 60 kg (132 lb 4.4 oz)   LMP  (LMP Unknown)   SpO2 100%   BMI 24.19 kg/m²   Pain Disability Index Review:  Last 3 PDI Scores 10/4/2022   Pain Disability Index (PDI) 40     Normocephalic.  Atraumatic.  Affect appropriate.  Breathing unlabored.  Extra ocular muscles intact.           General    Vitals reviewed.  Constitutional: She is oriented to person, place, and time. She appears well-developed and well-nourished. No distress.   Neurological: She is alert and oriented to person, place, and time.     General Musculoskeletal Exam   Gait: antalgic     Right Ankle/Foot Exam     Tests   Heel Walk: able to perform  Tiptoe Walk: able to perform  Single Heel Rise: unable to perform    Left Ankle/Foot Exam     Tests   Heel Walk: able to perform  Tiptoe Walk: able to perform  Single Heel Rise: unable to perform    Right Knee Exam     Other   Sensation: normal    Left Knee Exam     Other   Sensation: normal    Right Hip Exam   Right hip exam is normal.     Range of Motion   Abduction:  normal   Adduction:  normal   Extension:  normal   Flexion:  normal   External rotation:  normal   Internal rotation:  normal     Tests   Pain w/ forced internal rotation (JONATHAN): absent  Pain w/ forced external rotation (FADIR): absent    Other   Sensation: normal  Left Hip Exam   Left hip exam is normal.    Range of Motion   Abduction:  normal   Adduction:  normal   Extension:  normal   Flexion:  normal   External rotation:  normal   Internal rotation: normal     Tests   Pain w/ forced internal rotation (JONATHAN): absent  Pain w/ forced external rotation (FADIR): absent      Back (L-Spine & T-Spine) / Neck (C-Spine) Exam "   Back exam is normal.    Back (L-Spine & T-Spine) Range of Motion   Extension:  normal   Flexion:  normal   Lateral bend right:  normal   Lateral bend left:  normal   Rotation right:  normal   Rotation left:  normal     Spinal Sensation   Right Side Sensation  L-Spine Level: normal  Left Side Sensation  L-Spine Level: normal    Back (L-Spine & T-Spine) Tests   Right Side Tests  Straight leg raise: + at 60 deg        Femoral Stretch: negative  Left Side Tests  Femoral Stretch: negative    Other   She has no scoliosis .      Muscle Strength   Right Lower Extremity   Hip Abduction: 5/5   Hip Adduction: 5/5   Hip Flexion: 5/5   Quadriceps:  5/5   Hamstrin/5   Ankle Dorsiflexion:  5/5   Left Lower Extremity   Hip Abduction: 5/5   Hip Adduction: 5/5   Hip Flexion: 5/5   Quadriceps:  5/5   Hamstrin/5   Ankle Dorsiflexion:  5/5     Vascular Exam       Edema  Right Upper Leg: absent  Left Upper Leg: absent      Assessment:       Encounter Diagnoses   Name Primary?    Osteoarthritis of spine with radiculopathy, lumbar region     Chronic bilateral low back pain with bilateral sciatica     Claudication Yes    Spondylolisthesis, unspecified spinal region          Plan:   We discussed with the patient the assessment and recommendations. The following is the plan we agreed on:  Dynamic lumbar XR today  Referral to ortho spine for evaluation of grade 2 L5/S1 spondylolisthesis  Referral to outpatient PT  Return to clinic after ortho evaluation for trial of RED, if unsuccessful would benefit from SCS  HAILEY to r/o claudication        Anayeli was seen today for low-back pain.    Diagnoses and all orders for this visit:    Claudication  -     US Lower Extrem Arteries Bilat with HAILEY (xpd); Future  -     X-Ray Lumbar Complete Including Flex And Ext; Future  -     Cancel: Ambulatory referral/consult to Physical/Occupational Therapy; Future  -     Ambulatory referral/consult to Physical/Occupational Therapy; Future    Osteoarthritis  of spine with radiculopathy, lumbar region  -     Ambulatory referral/consult to Pain Clinic  -     X-Ray Lumbar Complete Including Flex And Ext; Future  -     Cancel: Ambulatory referral/consult to Physical/Occupational Therapy; Future  -     Ambulatory referral/consult to Physical/Occupational Therapy; Future    Chronic bilateral low back pain with bilateral sciatica  -     Ambulatory referral/consult to Pain Clinic  -     X-Ray Lumbar Complete Including Flex And Ext; Future  -     Cancel: Ambulatory referral/consult to Physical/Occupational Therapy; Future  -     Ambulatory referral/consult to Physical/Occupational Therapy; Future    Spondylolisthesis, unspecified spinal region  -     Ambulatory referral/consult to Orthopedics; Future                Johanny Lange MD [facility] Res[facility]    I have personally taken the history and examined this patient and agree with the resident's note as stated above.

## 2022-10-05 ENCOUNTER — PATIENT MESSAGE (OUTPATIENT)
Dept: INTERNAL MEDICINE | Facility: CLINIC | Age: 77
End: 2022-10-05
Payer: MEDICARE

## 2022-10-05 RX ORDER — CEFUROXIME AXETIL 250 MG/1
250 TABLET ORAL 2 TIMES DAILY
Qty: 14 TABLET | Refills: 0 | Status: SHIPPED | OUTPATIENT
Start: 2022-10-05 | End: 2022-10-12

## 2022-10-06 ENCOUNTER — OFFICE VISIT (OUTPATIENT)
Dept: ORTHOPEDICS | Facility: CLINIC | Age: 77
End: 2022-10-06
Payer: MEDICARE

## 2022-10-06 ENCOUNTER — PATIENT MESSAGE (OUTPATIENT)
Dept: INTERNAL MEDICINE | Facility: CLINIC | Age: 77
End: 2022-10-06
Payer: MEDICARE

## 2022-10-06 VITALS — WEIGHT: 134.5 LBS | BODY MASS INDEX: 24.75 KG/M2 | HEIGHT: 62 IN

## 2022-10-06 DIAGNOSIS — M54.16 LUMBAR RADICULOPATHY: ICD-10-CM

## 2022-10-06 DIAGNOSIS — M43.10 SPONDYLOLISTHESIS, UNSPECIFIED SPINAL REGION: ICD-10-CM

## 2022-10-06 DIAGNOSIS — M47.816 LUMBAR SPONDYLOSIS: Primary | ICD-10-CM

## 2022-10-06 PROCEDURE — 99214 PR OFFICE/OUTPT VISIT, EST, LEVL IV, 30-39 MIN: ICD-10-PCS | Mod: S$PBB,,, | Performed by: ORTHOPAEDIC SURGERY

## 2022-10-06 PROCEDURE — 99999 PR PBB SHADOW E&M-EST. PATIENT-LVL IV: ICD-10-PCS | Mod: PBBFAC,,, | Performed by: ORTHOPAEDIC SURGERY

## 2022-10-06 PROCEDURE — 99214 OFFICE O/P EST MOD 30 MIN: CPT | Mod: PBBFAC | Performed by: ORTHOPAEDIC SURGERY

## 2022-10-06 PROCEDURE — 99999 PR PBB SHADOW E&M-EST. PATIENT-LVL IV: CPT | Mod: PBBFAC,,, | Performed by: ORTHOPAEDIC SURGERY

## 2022-10-06 PROCEDURE — 99214 OFFICE O/P EST MOD 30 MIN: CPT | Mod: S$PBB,,, | Performed by: ORTHOPAEDIC SURGERY

## 2022-10-06 NOTE — PROGRESS NOTES
"DATE: 10/6/2022  PATIENT: Anayeli Judd    Supervising Physician: Luigi Ruggiero M.D.    CHIEF COMPLAINT: low back and bilateral leg pain    HISTORY:  Anayeli Judd is a 77 y.o. female with a pmhx of DM II, colon cx, parkinsonism, s/p cervical spine surgery (>15 years ago) and lumbar spine surgery (>10 years ago) here for initial evaluation of low back and bilateral leg pain (Back - 8, Leg - 8).  The pain in the left side of the lower back and down both legs is what bothers her most.  The pain has been present for many years, worsening over time. The patient describes the pain as "crushing" in her lower back and shooting down the entirety of both legs.  The pain is worse with walking and at night and improved by nothing. There is positive associated numbness and tingling. There is positive subjective weakness (L>R). Prior treatments have included PT and multiple ESIs >10 years ago with no relief, as well as some sort of lumbar fusion 10 years ago. Pt says the surgery helped for a few years.    The patient denies myelopathic symptoms such as handwriting changes or difficulty with buttons/coins/keys. Denies perineal paresthesias, bowel/bladder dysfunction.    PAST MEDICAL/SURGICAL HISTORY:  Past Medical History:   Diagnosis Date    Abdominal pain     Allergic rhinitis     Arthritis     Blood platelet disorder     Blood platelet disorder     Blood transfusion     during delivery and     Bowel obstruction     Cervical radiculopathy     followed by dr cloud    Colon cancer     transverse colon; resected; Stage IIA (pT3 pN0 MX)    Degenerative joint disease (DJD) of lumbar spine     Diabetes mellitus     Diarrhea     Falls 2020    Family history of breast cancer     Family history of colon cancer     Fatty liver     GERD (gastroesophageal reflux disease)     History of shingles     Hyperlipidemia     Hypomagnesemia     Hypothyroidism     Irritable bowel syndrome     Microscopic colitis     treated " 2013    Migraine     Myelodysplastic syndrome     Raynaud phenomenon     Raynaud's disease     Type 2 diabetes mellitus      Past Surgical History:   Procedure Laterality Date    ADENOIDECTOMY      APPENDECTOMY      BACK SURGERY      CARPAL TUNNEL RELEASE      bilateral      SECTION      CHOLECYSTECTOMY  1965    COLECTOMY  2011    Transverse colon resection by Dr. Aguirre    COLONOSCOPY N/A 2017    Procedure: COLONOSCOPY;  Surgeon: Manjit Alvarez MD;  Location: [facility] (4TH FLR);  Service: Endoscopy;  Laterality: N/A;    COLONOSCOPY N/A 2019    Procedure: COLONOSCOPY;  Surgeon: Ramiro Jefferson MD;  Location: [facility] (4TH FLR);  Service: Endoscopy;  Laterality: N/A;  PM prep    COLONOSCOPY N/A 2022    Procedure: COLONOSCOPY;  Surgeon: Ramiro Jefferson MD;  Location: [facility] (2ND FLR);  Service: Endoscopy;  Laterality: N/A;  EGD and colonoscopy in 6-8 weeks from now     states has constipation. -extended Golytely prep    CYSTOSCOPY N/A 2021    Procedure: CYSTOSCOPY;  Surgeon: Viridiana Valenzuela MD;  Location: [facility] 1ST FLR;  Service: Urology;  Laterality: N/A;    ENDOSCOPIC ULTRASOUND OF UPPER GASTROINTESTINAL TRACT N/A 2018    Procedure: ULTRASOUND, UPPER GI TRACT, ENDOSCOPIC;  Surgeon: Jose Hess MD;  Location: [facility];  Service: Endoscopy;  Laterality: N/A;    ENDOSCOPIC ULTRASOUND OF UPPER GASTROINTESTINAL TRACT N/A 2019    Procedure: ULTRASOUND, UPPER GI TRACT, ENDOSCOPIC;  Surgeon: Jose Hess MD;  Location: [facility] (2ND FLR);  Service: Endoscopy;  Laterality: N/A;    ESOPHAGOGASTRODUODENOSCOPY N/A 2018    Procedure: EGD (ESOPHAGOGASTRODUODENOSCOPY);  Surgeon: Angelo Reynolds MD;  Location: [facility] (2ND FLR);  Service: Endoscopy;  Laterality: N/A;    ESOPHAGOGASTRODUODENOSCOPY N/A 2019    Procedure: EGD (ESOPHAGOGASTRODUODENOSCOPY);  Surgeon: Ramiro Jefferson MD;  Location: [facility] (4TH FLR);  Service: Endoscopy;   Laterality: N/A;    ESOPHAGOGASTRODUODENOSCOPY N/A 5/4/2022    Procedure: EGD (ESOPHAGOGASTRODUODENOSCOPY);  Surgeon: Ramiro Jefferson MD;  Location: [facility] (2ND FLR);  Service: Endoscopy;  Laterality: N/A;  fully vaccinated-GT    EYE SURGERY      Cataract Removal    FLUOROSCOPIC URODYNAMIC STUDY N/A 11/09/2021    Procedure: URODYNAMIC STUDY, FLUOROSCOPIC;  Surgeon: Viridiana Valenzuela MD;  Location: [facility] OR 1ST FLR;  Service: Urology;  Laterality: N/A;  90 minutes     HYSTERECTOMY      JOINT REPLACEMENT      posterolateral fusion with autograft bone and Eun mineralized bone matrix  02/01/2013    at [facility] for lumbar spine stenosis    SMALL INTESTINE SURGERY      SPINE SURGERY      TOE SURGERY      TONSILLECTOMY      TRIGGER FINGER RELEASE         Medications:   Current Outpatient Medications on File Prior to Visit   Medication Sig Dispense Refill    acetaminophen (TYLENOL) 650 MG TbSR Take 650 mg by mouth once as needed (pain).      ascorbic acid, vitamin C, (VITAMIN C) 1000 MG tablet Take 1,000 mg by mouth once daily.      atorvastatin (LIPITOR) 40 MG tablet TAKE 1 TABLET BY MOUTH  DAILY 90 tablet 3    azelastine (ASTELIN) 137 mcg (0.1 %) nasal spray 2 sprays (274 mcg total) by Nasal route 2 (two) times daily as needed for Rhinitis. (Patient not taking: Reported on 10/4/2022)      BACTROBAN NASAL 2 % Oint 1 g by Nasal route 2 (two) times daily. for 14 days 28 g 1    biotin 10,000 mcg TbDL Take 1 tablet by mouth once daily.       calcium-vitamin D3 (OS-KASSANDRA 500 + D3) 500 mg-5 mcg (200 unit) per tablet Take 1,200 tablets by mouth once daily.      cefUROXime (CEFTIN) 250 MG tablet Take 1 tablet (250 mg total) by mouth 2 (two) times daily. for 7 days 14 tablet 0    conjugated estrogens (PREMARIN) vaginal cream INSERT 1/2 GRAM VAGINALLY  TWICE WEEKLY 30 g 3    cranberry extract 200 mg Cap Take 1 capsule by mouth once daily.      donepeziL (ARICEPT) 10 MG tablet Take 1 tablet (10 mg total) by mouth every evening. 90  tablet 3    DULoxetine (CYMBALTA) 30 MG capsule Take 1 capsule (30 mg total) by mouth 2 (two) times daily. 180 capsule 3    ferrous sulfate 324 mg (65 mg iron) TbEC Take 1 tablet (324 mg total) by mouth once daily.  0    flash glucose scanning reader (FREESTYLE EFREN 14 DAY READER) Misc Check blood glucose level 3 times daily. 1 each 0    flash glucose sensor (FREESTYLE EFREN 14 DAY SENSOR) Kit 1 application by Misc.(Non-Drug; Combo Route) route every 14 (fourteen) days. Disp 30 or 90 day refill 6 kit 3    fluticasone propionate (FLONASE) 50 mcg/actuation nasal spray 1 spray by Each Nostril route daily as needed for Rhinitis.      gabapentin (NEURONTIN) 300 MG capsule Take 2 capsules (600 mg total) by mouth 2 (two) times daily as needed (Back pain). 120 capsule 11    hydrocortisone 2.5 % cream Apply topically 2 (two) times daily as needed. 1 each 1    levocetirizine (XYZAL) 5 MG tablet Take 1 tablet (5 mg total) by mouth every evening. 30 tablet 5    levothyroxine (SYNTHROID) 75 MCG tablet Take 1 tablet (75 mcg total) by mouth before breakfast. 30 tablet 11    LIDOcaine (LIDODERM) 5 % Place 1 patch onto the skin once daily. Remove & Discard patch within 12 hours or as directed by MD 30 patch 0    magnesium glycinate 100 mg Tab Take 500 mg by mouth once daily at 6am.      metFORMIN (GLUCOPHAGE) 500 MG tablet TAKE 1 TABLET(500 MG) BY MOUTH DAILY WITH BREAKFAST 90 tablet 1    montelukast (SINGULAIR) 10 mg tablet TAKE 1 TABLET BY MOUTH  DAILY 90 tablet 3    ondansetron (ZOFRAN-ODT) 8 MG TbDL Take 1 tablet (8 mg total) by mouth every 8 (eight) hours as needed (nausea). 30 tablet 0    pantoprazole (PROTONIX) 20 MG tablet Take 1 tablet (20 mg total) by mouth once daily. 30 tablet 2    polyethylene glycol (GLYCOLAX) 17 gram/dose powder Take 17 g by mouth daily as needed (Constipation). 1530 g 0    traZODone (DESYREL) 50 MG tablet Take 1 tablet (50 mg total) by mouth every evening. For sleep. 90 tablet 1    triamcinolone  acetonide 0.1% (KENALOG) 0.1 % paste APPLY TO THE AFFECTED AREA WITH EVERY-TIP THREE TIMES DAILY      vitamin D 1000 units Tab Take 1,000 Units by mouth once daily. D3      [DISCONTINUED] hyoscyamine (ANASPAZ,LEVSIN) 0.125 mg Tab TAKE 1 TABLET(125 MCG) BY MOUTH EVERY 8 HOURS AS NEEDED FOR URGENCY 270 tablet 3     No current facility-administered medications on file prior to visit.       Social History:   Social History     Socioeconomic History    Marital status:    Tobacco Use    Smoking status: Never    Smokeless tobacco: Never   Substance and Sexual Activity    Alcohol use: Not Currently     Comment: socially, rarely    Drug use: Never    Sexual activity: Not Currently   Social History Narrative    , lives alone.  Primary support are her children and friends.     Social Determinants of Health     Financial Resource Strain: Low Risk     Difficulty of Paying Living Expenses: Not hard at all   Food Insecurity: No Food Insecurity    Worried About Running Out of Food in the Last Year: Never true    Ran Out of Food in the Last Year: Never true   Transportation Needs: No Transportation Needs    Lack of Transportation (Medical): No    Lack of Transportation (Non-Medical): No   Physical Activity: Inactive    Days of Exercise per Week: 0 days    Minutes of Exercise per Session: 0 min   Stress: No Stress Concern Present    Feeling of Stress : Not at all   Social Connections: Unknown    Frequency of Communication with Friends and Family: More than three times a week    Frequency of Social Gatherings with Friends and Family: More than three times a week    Marital Status:    Housing Stability: Low Risk     Unable to Pay for Housing in the Last Year: No    Number of Places Lived in the Last Year: 1    Unstable Housing in the Last Year: No       REVIEW OF SYSTEMS:  Constitution: Negative. Negative for chills, fever and night sweats.   Cardiovascular: Negative for chest pain and syncope.   Respiratory:  Negative for cough and shortness of breath.   Gastrointestinal: See HPI. Negative for nausea/vomiting. Negative for abdominal pain.  Genitourinary: See HPI. Negative for discoloration or dysuria.  Skin: Negative for dry skin, itching and rash.   Hematologic/Lymphatic: Negative for bleeding problem. Does not bruise/bleed easily.   Musculoskeletal: Negative for falls and muscle weakness.   Neurological: See HPI. No seizures.   Endocrine: Negative for polydipsia, polyphagia and polyuria.   Allergic/Immunologic: Negative for hives and persistent infections.     EXAM:  LMP  (LMP Unknown)     General: The patient is a very pleasant 77 y.o. female in no apparent distress, the patient is oriented to person, place and time.  Psych: Normal mood and affect  HEENT: Vision grossly intact, hearing intact to the spoken word.  Lungs: Respirations unlabored.  Gait: Antalgic station and gait, no difficulty with toe or heel walk.   Skin: Dorsal lumbar skin negative for rashes, lesions, hairy patches and surgical scars. There is mild lumbar tenderness to palpation.  Range of motion: Lumbar range of motion is acceptable.  Spinal Balance: Global saggital and coronal spinal balance acceptable, not significant for scoliosis and kyphosis.  Musculoskeletal: No pain with the range of motion of the bilateral hips. No trochanteric tenderness to palpation.  Vascular: Bilateral lower extremities warm and well perfused, dorsalis pedis pulses 2+ bilaterally.  Neurological: Normal strength and tone in all major motor groups in the bilateral lower extremities. Normal sensation to light touch in the L2-S1 dermatomes bilaterally.  Deep tendon reflexes symmetric 2+ in the bilateral lower extremities.  Negative Babinski bilaterally. Straight leg raise negative bilaterally.    IMAGING:      Today I personally reviewed AP, Lat and Flex/Ex  upright L-spine films that demonstrate grade 1 retrolisthesis L2-3.  Grade 1 anterolisthesis L4-5.  Grade 2  anterolisthesis L5-S1 with suspected at least unilateral pars defect at L5.      MRI lumbar demonstrates multilevel degenerative change resulting in multilevel moderate to severe neural foraminal narrowing L2-3 through L5-S1.     There is no height or weight on file to calculate BMI.    Hemoglobin A1C   Date Value Ref Range Status   09/27/2022 7.0 (H) 4.0 - 5.6 % Final     Comment:     ADA Screening Guidelines:  5.7-6.4%  Consistent with prediabetes  >or=6.5%  Consistent with diabetes    High levels of fetal hemoglobin interfere with the HbA1C  assay. Heterozygous hemoglobin variants (HbS, HgC, etc)do  not significantly interfere with this assay.   However, presence of multiple variants may affect accuracy.     04/13/2022 6.9 (H) 4.0 - 5.6 % Final     Comment:     ADA Screening Guidelines:  5.7-6.4%  Consistent with prediabetes  >or=6.5%  Consistent with diabetes    High levels of fetal hemoglobin interfere with the HbA1C  assay. Heterozygous hemoglobin variants (HbS, HgC, etc)do  not significantly interfere with this assay.   However, presence of multiple variants may affect accuracy.     01/24/2022 6.9 (H) 4.0 - 5.6 % Final     Comment:     ADA Screening Guidelines:  5.7-6.4%  Consistent with prediabetes  >or=6.5%  Consistent with diabetes    High levels of fetal hemoglobin interfere with the HbA1C  assay. Heterozygous hemoglobin variants (HbS, HgC, etc)do  not significantly interfere with this assay.   However, presence of multiple variants may affect accuracy.             ASSESSMENT/PLAN:    There are no diagnoses linked to this encounter.    Today we discussed at length all of the different treatment options including anti-inflammatories, acetaminophen, rest, ice, heat, physical therapy including strengthening and stretching exercises, home exercises, ROM, aerobic conditioning, aqua therapy, other modalities including ultrasound, massage, and dry needling, epidural steroid injections and finally surgical  intervention.      Pt presents with chronic low back pain and bilateral lumbar radiculopathy. Will try bilateral L5-S1 TFESI. If pain persists, will have pt fu with Dr. Ruggiero to discuss surgical options. However, will most likely be a multilevel fusion.

## 2022-10-10 ENCOUNTER — PATIENT MESSAGE (OUTPATIENT)
Dept: PAIN MEDICINE | Facility: OTHER | Age: 77
End: 2022-10-10
Payer: MEDICARE

## 2022-10-10 ENCOUNTER — TELEPHONE (OUTPATIENT)
Dept: PAIN MEDICINE | Facility: OTHER | Age: 77
End: 2022-10-10
Payer: MEDICARE

## 2022-10-10 DIAGNOSIS — M54.16 LUMBAR RADICULOPATHY: Primary | ICD-10-CM

## 2022-10-10 NOTE — TELEPHONE ENCOUNTER
Spoke to patient to schedule direct referral procedure. Patient is scheduled on 10/17/22 at 11:30 AM with Dr. Osorio. Patient was offered an opportunity to be scheduled in the [facility] for a consult with the performing provider before scheduling. Patient declined provider consult. Date, time, and instructions for procedure given to patient. Patient verbalized understanding.

## 2022-10-11 ENCOUNTER — TELEPHONE (OUTPATIENT)
Dept: INTERNAL MEDICINE | Facility: CLINIC | Age: 77
End: 2022-10-11
Payer: MEDICARE

## 2022-10-11 RX ORDER — CIPROFLOXACIN 500 MG/1
500 TABLET ORAL 2 TIMES DAILY
Qty: 20 TABLET | Refills: 0 | Status: SHIPPED | OUTPATIENT
Start: 2022-10-11 | End: 2022-10-21

## 2022-10-11 NOTE — TELEPHONE ENCOUNTER
Pt does not feel like Ceftin course was effective for UTI. Still with potential symptoms and low grade fever. No other new symptoms such as cough. She would like to forego coming in to give another UA. Since we have a culture from recent UA with sensitivities, I am okay with starting course of Cipro. Return for repeat UA if no significant improvement in next few days.

## 2022-10-11 NOTE — TELEPHONE ENCOUNTER
----- Message from Michaela Kaiser sent at 10/11/2022  9:25 AM CDT -----  Regarding: not feeling well UTI  Please call 's daughter Danielle [phone].  Ms. Galvan UTI is not getting better.  She is feeling miserable and does not want to get out of bed to come in.  Said she is drinking water but not urinating very much. Said she was sweating all night long.  I did try to get them to come in at 3:00 but wants you to call her.     Thanks,  Mihcaela

## 2022-10-12 RX ORDER — PROMETHAZINE HYDROCHLORIDE AND CODEINE PHOSPHATE 6.25; 1 MG/5ML; MG/5ML
5 SOLUTION ORAL EVERY 6 HOURS PRN
Qty: 118 ML | Refills: 0 | Status: SHIPPED | OUTPATIENT
Start: 2022-10-12 | End: 2022-12-08

## 2022-10-12 RX ORDER — PROMETHAZINE HYDROCHLORIDE AND CODEINE PHOSPHATE 6.25; 1 MG/5ML; MG/5ML
5 SOLUTION ORAL EVERY 4 HOURS PRN
COMMUNITY
End: 2022-10-12 | Stop reason: SDUPTHER

## 2022-10-19 ENCOUNTER — PATIENT MESSAGE (OUTPATIENT)
Dept: INTERNAL MEDICINE | Facility: CLINIC | Age: 77
End: 2022-10-19
Payer: MEDICARE

## 2022-10-25 ENCOUNTER — PATIENT MESSAGE (OUTPATIENT)
Dept: INTERNAL MEDICINE | Facility: CLINIC | Age: 77
End: 2022-10-25
Payer: MEDICARE

## 2022-10-25 DIAGNOSIS — R04.0 EPISTAXIS: Primary | ICD-10-CM

## 2022-10-26 ENCOUNTER — HOSPITAL ENCOUNTER (OUTPATIENT)
Dept: RADIOLOGY | Facility: OTHER | Age: 77
Discharge: HOME | End: 2022-10-26
Attending: ANESTHESIOLOGY
Payer: MEDICARE

## 2022-10-26 DIAGNOSIS — I73.9 CLAUDICATION: ICD-10-CM

## 2022-10-26 PROCEDURE — 93925 LOWER EXTREMITY STUDY: CPT | Mod: 26,,, | Performed by: RADIOLOGY

## 2022-10-26 PROCEDURE — 93922 UPR/L XTREMITY ART 2 LEVELS: CPT | Mod: 26,,, | Performed by: RADIOLOGY

## 2022-10-26 PROCEDURE — 93925 LOWER EXTREMITY STUDY: CPT | Mod: TC

## 2022-10-26 PROCEDURE — 93922 US ARTERIAL LOWER EXTREMITY BILAT WITH ABI (XPD): ICD-10-PCS | Mod: 26,,, | Performed by: RADIOLOGY

## 2022-10-26 PROCEDURE — 93925 US ARTERIAL LOWER EXTREMITY BILAT WITH ABI (XPD): ICD-10-PCS | Mod: 26,,, | Performed by: RADIOLOGY

## 2022-10-27 ENCOUNTER — PATIENT MESSAGE (OUTPATIENT)
Dept: INTERNAL MEDICINE | Facility: CLINIC | Age: 77
End: 2022-10-27
Payer: MEDICARE

## 2022-10-27 ENCOUNTER — OFFICE VISIT (OUTPATIENT)
Dept: OTOLARYNGOLOGY | Facility: CLINIC | Age: 77
End: 2022-10-27
Payer: MEDICARE

## 2022-10-27 DIAGNOSIS — R04.0 EPISTAXIS, RECURRENT: Primary | ICD-10-CM

## 2022-10-27 PROCEDURE — 31231 NASAL ENDOSCOPY DX: CPT | Mod: PBBFAC | Performed by: PHYSICIAN ASSISTANT

## 2022-10-27 PROCEDURE — 99215 PR OFFICE/OUTPT VISIT, EST, LEVL V, 40-54 MIN: ICD-10-PCS | Mod: 25,S$PBB,ICN, | Performed by: NURSE PRACTITIONER

## 2022-10-27 PROCEDURE — 99214 OFFICE O/P EST MOD 30 MIN: CPT | Mod: PBBFAC | Performed by: NURSE PRACTITIONER

## 2022-10-27 PROCEDURE — 99215 OFFICE O/P EST HI 40 MIN: CPT | Mod: 25,S$PBB,ICN, | Performed by: NURSE PRACTITIONER

## 2022-10-27 PROCEDURE — 99999 PR PBB SHADOW E&M-EST. PATIENT-LVL IV: CPT | Mod: PBBFAC,,, | Performed by: NURSE PRACTITIONER

## 2022-10-27 PROCEDURE — 31237 NASAL/SINUS ENDOSCOPY: ICD-10-PCS | Mod: S$PBB,RT,, | Performed by: PHYSICIAN ASSISTANT

## 2022-10-27 PROCEDURE — 99999 PR PBB SHADOW E&M-EST. PATIENT-LVL IV: ICD-10-PCS | Mod: PBBFAC,,, | Performed by: NURSE PRACTITIONER

## 2022-10-27 PROCEDURE — 31237 NSL/SINS NDSC SURG BX POLYPC: CPT | Mod: S$PBB,RT,, | Performed by: PHYSICIAN ASSISTANT

## 2022-10-27 RX ORDER — AMOXICILLIN AND CLAVULANATE POTASSIUM 875; 125 MG/1; MG/1
1 TABLET, FILM COATED ORAL 2 TIMES DAILY
Qty: 20 TABLET | Refills: 0 | Status: SHIPPED | OUTPATIENT
Start: 2022-10-27 | End: 2022-11-06

## 2022-10-27 NOTE — PROGRESS NOTES
Subjective:     Anayeli Judd is a 77 y.o. female who was self-referred for nosebleeds.    Patient states that she has dealt with sinus related issues for many years.  She reports previous sinus surgeries as well as septoplasty.  Patient states that recently she has dealt with and intermittent slow nose bleed out of her right nostril.  She does report that there was some type of lesion in her right nasal cavity for which she had been advised to use Vaseline on.  Patient states that last night she use a Q-tip to apply Vaseline to her R nostril and the nose started to bleed profusely.    The last episode was last night, and is not currently active.  There is a prior history of nasal surgery.  There is not a prior history of nasal trauma.  There is a history of environmental allergies.  She does not currently use a nasal spray.  There is not a family history to suggest a clotting disorder.  She does not currently take a blood-thinning agent.  She has previously (remotely) had nasal cauterization.  The bleeding has not required packing for control.      Past Medical History  She has a past medical history of Abdominal pain, Allergic rhinitis, Arthritis, Blood platelet disorder, Blood platelet disorder, Blood transfusion, Bowel obstruction, Cervical radiculopathy, Colon cancer, Degenerative joint disease (DJD) of lumbar spine, Diabetes mellitus, Diarrhea, Falls, Family history of breast cancer, Family history of colon cancer, Fatty liver, GERD (gastroesophageal reflux disease), History of shingles, Hyperlipidemia, Hypomagnesemia, Hypothyroidism, Irritable bowel syndrome, Microscopic colitis, Migraine, Myelodysplastic syndrome, Raynaud phenomenon, Raynaud's disease, and Type 2 diabetes mellitus.    Past Surgical History  She has a past surgical history that includes Hysterectomy; Appendectomy; Toe Surgery; Tonsillectomy;  section; posterolateral fusion with autograft bone and Eun mineralized bone matrix  (02/01/2013); Carpal tunnel release; Trigger finger release; Back surgery; Cholecystectomy (1965); Colectomy (06/27/2011); Colonoscopy (N/A, 07/27/2017); Eye surgery; Esophagogastroduodenoscopy (N/A, 11/16/2018); Endoscopic ultrasound of upper gastrointestinal tract (N/A, 12/12/2018); Endoscopic ultrasound of upper gastrointestinal tract (N/A, 01/23/2019); Esophagogastroduodenoscopy (N/A, 06/26/2019); Colonoscopy (N/A, 06/26/2019); Fluoroscopic urodynamic study (N/A, 11/09/2021); Cystoscopy (N/A, 11/09/2021); Adenoidectomy; Small intestine surgery; Spine surgery; Joint replacement; Esophagogastroduodenoscopy (N/A, 5/4/2022); and Colonoscopy (N/A, 5/4/2022).    Family History  Her family history includes Alcohol abuse in her brother, brother, and brother; Arthritis in her brother, brother, and daughter; Asthma in her daughter and daughter; Bladder Cancer in her mother; Breast cancer (age of onset: 79) in her sister; Cataracts in her mother; Colon cancer in her father; Colon cancer (age of onset: 70) in her brother; Depression in her daughter, daughter, and daughter; Glaucoma in her mother; Heart disease in her mother; Heart disease (age of onset: 50) in her father; Hyperlipidemia in her mother; Hypertension in her daughter; Kidney disease in her mother; Parkinsonism in her brother; Stroke in her daughter; Stroke (age of onset: 40) in her daughter.    Social History  She reports that she has never smoked. She has never used smokeless tobacco. She reports that she does not currently use alcohol. She reports that she does not use drugs.    Allergies  She is allergic to codeine; dilaudid [hydromorphone (bulk)]; percocet [oxycodone-acetaminophen]; sulfa (sulfonamide antibiotics); and latex, natural rubber.    Medications  She has a current medication list which includes the following prescription(s): acetaminophen, ascorbic acid (vitamin c), atorvastatin, biotin, calcium-vitamin d3, premarin, cranberry extract, donepezil,  duloxetine, ferrous sulfate, freestyle dev 14 day reader, freestyle dev 14 day sensor, hydrocortisone, levocetirizine, levothyroxine, lidocaine, magnesium glycinate, metformin, montelukast, ondansetron, pantoprazole, polyethylene glycol, promethazine-codeine 6.25-10 mg/5 ml, triamcinolone acetonide 0.1%, vitamin d, amoxicillin-clavulanate 875-125mg, and [DISCONTINUED] hyoscyamine.    Review of Systems     Constitutional: Positive for appetite change and fatigue.  Negative for chills and fever.      HENT: Positive for nosebleeds, sinus pressure, sore throat and stuffy nose.  Negative for ear discharge, ear pain, hearing loss and postnasal drip.      Eyes:  Positive for photophobia.     Respiratory:  Positive for snoring. Negative for cough and shortness of breath.      Cardiovascular:  Negative for chest pain, foot swelling, irregular heartbeat and swollen veins.     Gastrointestinal:  Positive for abdominal pain, acid reflux, constipation and diarrhea.     Genitourinary: Positive for difficulty urinating.     Musc: Positive for back pain and neck pain.     Skin: Positive for rash.     Allergy: Positive for food allergies and seasonal allergies.     Endocrine: Negative for cold intolerance and heat intolerance.      Neurological: Positive for headaches, light-headedness and tremors. Negative for dizziness.      Hematologic: Positive for bruises/bleeds easily. Negative for swollen glands.      Psychiatric: Positive for decreased concentration, nervous/anxious and sleep disturbance. Negative for depression.        Objective:       Constitutional:   She appears well-developed and well-nourished. She is cooperative.   Elderly female in NAD accompanied by sitter No hoarse voice.      Head:  Normocephalic.     Nose:  No mucosal edema, rhinorrhea, nose lacerations, sinus tenderness, septal deviation, nasal septal hematoma or polyps. Epistaxis is observed.  No foreign bodies. Turbinates normal, no turbinate masses and no  turbinate hypertrophy.  Right sinus exhibits no maxillary sinus tenderness and no frontal sinus tenderness. Left sinus exhibits no maxillary sinus tenderness and no frontal sinus tenderness.   On anterior rhinoscopy,   dark lesion, likely from coagulated blood posterior to kiesselbach plexus (on right).    Mouth/Throat  Oropharynx clear and moist without lesions or asymmetry. No oropharyngeal exudate or posterior oropharyngeal erythema.     Neck:  No adenopathy. Normal range of motion present.     She has no cervical adenopathy.     Skin:   No rash noted.     Procedure  Source of bleeding from right septum more posterior than Kiesselbach plexus.  Brought patient into procedure room to further evaluate under nasal endoscopy.  Nasal endoscopy performed.  Please see procedure note  Nasal cauterization performed.  See procedure note.    Data Reviewed    Hemoglobin (g/dL)   Date Value   09/27/2022 11.7 (L)     POC Hematocrit (%PCV)   Date Value   05/28/2022 40     Hematocrit (%)   Date Value   09/27/2022 37.1     Platelets (K/uL)   Date Value   09/27/2022 82 (L)     Prothrombin Time (sec)   Date Value   02/11/2022 11.4     aPTT (sec)   Date Value   07/26/2011 24.8     INR (no units)   Date Value   02/11/2022 1.1       Assessment and Plan:     1. Epistaxis, recurrent  -     amoxicillin-clavulanate 875-125mg (AUGMENTIN) 875-125 mg per tablet; Take 1 tablet by mouth 2 (two) times daily. for 10 days  Dispense: 20 tablet; Refill: 0  -    Rhinorocket (4.5cm) place to right nostril. I have advised to her use nasal saline spray in both nostril 2-4 times daily    Had a lengthy conversation with the patient regarding the etiology of epistaxis and management strategies.  Patient provided detailed instructions for the management and prevention of nosebleeds and written instructions were given in their AVS.  She was also advised about when to seek emergency care.       Patient has follow-up with Dr. Chavez previously scheduled for  Monday.  Advised patient to follow-up with Dr. Chavez for any evidence of further bleeding.    I had a discussion with the patient and her caregiver  regarding her condition and the further workup and management options.    All questions were answered, and the patient is in agreement with the above.        49 minutes of total time spent on the encounter, which includes face to face time and non-face to face time preparing to see the patient (eg, review of tests), Obtaining and/or reviewing separately obtained history, Documenting clinical information in the electronic or other health record, Independently interpreting results (not separately reported) and communicating results to the patient/family/caregiver, or Care coordination (not separately reported).

## 2022-10-27 NOTE — PATIENT INSTRUCTIONS
Do not blow nose for 1 week.  Sneeze with mouth open.  Do not take ibuprofen or aspirin for 1 week.  Place saline nasal gel in nostrils at bedtime.  May use saline nose drops during the day twice daily and as needed to prevent dryness.  If bleeding recurs, use oxymetazoline (Afrin) spray in the nostril and call for follow-up appointment.

## 2022-10-27 NOTE — PROCEDURES
"Nasal/sinus endoscopy    Date/Time: 10/27/2022 2:00 PM    Time out: Immediately prior to procedure a "time out" was called to verify the correct patient, procedure, equipment, support staff and site/side marked as required.  Performed by: Primo Sal PA-C  Authorized by: Primo Sal PA-C     Consent Done?:  Yes (Verbal)  Anesthesia:     Local anesthetic:  Lidocaine 2% and Harry-Synephrine 1/2%    Type of Endoscope:  Flexible    Patient tolerance:  Patient tolerated the procedure well with no immediate complications  Nose:     Procedure Performed:  Nasal Endoscopy and Removal of Debridement  Intranasal:      Mucosa no polyps     No mucosa lesions present     Turbinates not enlarged     Septum gross deformity  Nasopharynx:      Adenoids not present     Posterior choanae patent     Eustachian tube patent     Right:  Removed semi-solid coagulated material from R nasal cavity using alligator forceps and suction  Anterior nasal septal wound overly a spur which is likely source of epistaxis. Bleeding was mild but continuous after removal of debris.  Packed R side with rhino rocket 4.5 cm  "

## 2022-10-31 ENCOUNTER — OFFICE VISIT (OUTPATIENT)
Dept: OTOLARYNGOLOGY | Facility: CLINIC | Age: 77
End: 2022-10-31
Payer: MEDICARE

## 2022-10-31 VITALS — DIASTOLIC BLOOD PRESSURE: 78 MMHG | SYSTOLIC BLOOD PRESSURE: 121 MMHG | TEMPERATURE: 98 F | HEART RATE: 77 BPM

## 2022-10-31 DIAGNOSIS — R04.0 EPISTAXIS: ICD-10-CM

## 2022-10-31 PROCEDURE — 99214 OFFICE O/P EST MOD 30 MIN: CPT | Mod: S$GLB,,, | Performed by: OTOLARYNGOLOGY

## 2022-10-31 PROCEDURE — 99214 PR OFFICE/OUTPT VISIT, EST, LEVL IV, 30-39 MIN: ICD-10-PCS | Mod: S$GLB,,, | Performed by: OTOLARYNGOLOGY

## 2022-10-31 NOTE — PROGRESS NOTES
Ms. Judd     Vitals:    10/31/22 1048   BP: 121/78   Pulse: 77   Temp: 97.9 °F (36.6 °C)       Chief Complaint:  Follow-up       HPI:   is a 77-year-old white female who presents referred by OSORIO Grant with history of epistaxis.  She was seen by him 3 days ago for nasal bleeding and a rhino rocket was placed on the right side.  She was placed on antibiotics and referred to me for evaluation and treatment.  She states that she is had off and on nosebleeds it does bruise easily as evidenced by her significant bruising on her arms.  She denies any nasal trauma.    Review of Systems:  Constitutional:   weight loss or weight gain: Negative  Allergy/Immunologic:   Positive  Nasal Congestion/Obstruction:   Positive at present as she has pack in place.  Nosebleeds:   Positive as above.  Sinus infections:   Negative  Headache/Facial Pain:   Positive  Snoring/EL:   Negative  Throat: Infections/Pain:   Negative  Hoarseness/Speech Disturbance:   Negative  Trauma Hx:  Negative    Cardiovascular:  M/I Angina: Negative  Hypertension: Negative  Endocrine:    DM/Steroids:  Positive for type 2 diabetes.  GI:   Dysphagia/Reflux:  Positive history of GERD.  :   GYN Pregnancy: Negative  Renal:   Dialysis: Negative  Lymphatic:   Neck Mass/Lymphadenopathy: Negative  Muscoloskeletal:   Negative  Hematologic:   Bleeding Disorders/Anemia: Negative  Neurologic:    Cranial/Neuralgia: Negative  Pulmonary:   Asthma/SOB/Cough: Negative  Skin Disorders: Negative    Past Medical/Surgical/Family/Social History:    ENT Surgery: Negative  Occupational Exposure: Negative   Problems: Negative  Cancer: Negative    Past Family History:   Family history of Cancer: Negative    Past Social History:   Tobacco: Nonsmoker   Alcohol:  Non Drinker      Allergies and medications: Reviewed per med card.    Physical Examination:  Ears:   External auditory canals:  Clear   Hearing: Grossly intact   Tympanic Membranes: Clear  Nose:   External:  Normal   Intranasal:  Rhino rocket in place on the right side.  Mouth:   Intraorally: Lips, teeth, and gums: Normal   Oropharynx: Normal   Mucosa: Normal   Tongue: Normal  Throat:      Palate: Normal palate with elevation   Tonsils: Normal   Posterior Pharynx: Normal  Fiberoptic exam: Not performed  Head/Face:     Inspection: Normal and atraumatic   Palpation/Percussion: Non tender   Facial strength: Normal and symmetric   Salivary glands: Normal  Neck: Supple  Thyroid: No masses  Lymphatics: No nodes  Respiratory:   Effort: Normal  Eyes:   Ocular Mobility: Normal   Vision: Grossly intact  Neuro/Psych:   Cranial Nerves: Grossly Intact   Orientation: Normal   Mood/Affect: Normal      Assessment/Plan:  After deflation of rhino rocket and allowing for 5 minutes I have removed this without difficulty.  She has a small area of mucosal disruption along a slight spur on the inferior aspect of her anterior to mid septum on the right side.  This is not bleeding at this time.  I have given her an epistaxis precaution sheet and reviewed all the recommendations with her in detail.  If she has a nosebleed she will use Afrin as instructed and demonstrated.  If this does not alleviate her bleed she will proceed to [facility] ED for evaluation and treatment.  She will return to clinic here p.r.n.

## 2022-11-01 ENCOUNTER — PATIENT MESSAGE (OUTPATIENT)
Dept: PAIN MEDICINE | Facility: OTHER | Age: 77
End: 2022-11-01
Payer: MEDICARE

## 2022-11-01 NOTE — TELEPHONE ENCOUNTER
Patient has started antibiotics and is scheduled for a procedure 11/07 please contact and reschedule

## 2022-11-02 ENCOUNTER — PATIENT MESSAGE (OUTPATIENT)
Dept: PAIN MEDICINE | Facility: OTHER | Age: 77
End: 2022-11-02
Payer: MEDICARE

## 2022-11-03 DIAGNOSIS — Z71.89 COMPLEX CARE COORDINATION: ICD-10-CM

## 2022-11-13 ENCOUNTER — PATIENT MESSAGE (OUTPATIENT)
Dept: INTERNAL MEDICINE | Facility: CLINIC | Age: 77
End: 2022-11-13
Payer: MEDICARE

## 2022-11-13 DIAGNOSIS — Z97.4 USES HEARING AID: ICD-10-CM

## 2022-11-13 DIAGNOSIS — H91.90 HEARING LOSS, UNSPECIFIED HEARING LOSS TYPE, UNSPECIFIED LATERALITY: Primary | ICD-10-CM

## 2022-11-14 ENCOUNTER — TELEPHONE (OUTPATIENT)
Dept: DERMATOLOGY | Facility: CLINIC | Age: 77
End: 2022-11-14
Payer: MEDICARE

## 2022-11-14 NOTE — TELEPHONE ENCOUNTER
----- Message from Jessica Tejeda sent at 11/14/2022  9:53 AM CST -----  Regarding: PT Advice/ 11/16/2022  Pt is requesting a call back regarding if She Can take over the Counter Medication prior to Procedure   Pt states she is Running a Fever Please Call to discuss further .      [phone]

## 2022-11-14 NOTE — TELEPHONE ENCOUNTER
Pt states she is running a low-grade fever (99.2 F) and wants to take medication. Informed pt that it is OK to take some tylenol to lower the fever. Pt states she also has a 'drippy' sinus but no other symptoms. If pt still running fever tomorrow, pt will call office to cancel Mohs for Wed at 12:30pm, L mid extensor forearm.

## 2022-11-15 ENCOUNTER — PATIENT MESSAGE (OUTPATIENT)
Dept: INTERNAL MEDICINE | Facility: CLINIC | Age: 77
End: 2022-11-15
Payer: MEDICARE

## 2022-11-15 ENCOUNTER — TELEPHONE (OUTPATIENT)
Dept: DERMATOLOGY | Facility: CLINIC | Age: 77
End: 2022-11-15
Payer: MEDICARE

## 2022-11-15 NOTE — TELEPHONE ENCOUNTER
"----- Message from Sallie Nirmal sent at 11/15/2022  9:18 AM CST -----  Consult/Advisory    Name Of Caller: self    Contact Preference?: [phone]    What is the nature of the call?: states that she has temperature of 99.4 this morning and requesting a call back to discuss           Additional Notes:  "Thank you for all that you do for our patients'"     "

## 2022-11-16 ENCOUNTER — PROCEDURE VISIT (OUTPATIENT)
Dept: DERMATOLOGY | Facility: CLINIC | Age: 77
End: 2022-11-16
Payer: MEDICARE

## 2022-11-16 VITALS
WEIGHT: 134 LBS | SYSTOLIC BLOOD PRESSURE: 121 MMHG | HEIGHT: 62 IN | HEART RATE: 60 BPM | DIASTOLIC BLOOD PRESSURE: 56 MMHG | BODY MASS INDEX: 24.66 KG/M2

## 2022-11-16 DIAGNOSIS — C44.621 SQUAMOUS CELL CARCINOMA OF UPPER EXTREMITY, UNSPECIFIED LATERALITY: ICD-10-CM

## 2022-11-16 DIAGNOSIS — C44.629 SQUAMOUS CELL CARCINOMA OF LEFT UPPER EXTREMITY: Primary | ICD-10-CM

## 2022-11-16 PROCEDURE — 13120 CMPLX RPR S/A/L 1.1-2.5 CM: CPT | Mod: S$PBB,51,, | Performed by: DERMATOLOGY

## 2022-11-16 PROCEDURE — 17313 MOHS 1 STAGE T/A/L: CPT | Mod: PBBFAC | Performed by: DERMATOLOGY

## 2022-11-16 PROCEDURE — 17313: ICD-10-PCS | Mod: S$PBB,,, | Performed by: DERMATOLOGY

## 2022-11-16 PROCEDURE — 17314 MOHS ADDL STAGE T/A/L: CPT | Mod: S$PBB,,, | Performed by: DERMATOLOGY

## 2022-11-16 PROCEDURE — 17314 MOHS ADDL STAGE T/A/L: CPT | Mod: PBBFAC | Performed by: DERMATOLOGY

## 2022-11-16 PROCEDURE — 17313 MOHS 1 STAGE T/A/L: CPT | Mod: S$PBB,,, | Performed by: DERMATOLOGY

## 2022-11-16 PROCEDURE — 17314: ICD-10-PCS | Mod: S$PBB,,, | Performed by: DERMATOLOGY

## 2022-11-16 PROCEDURE — 13120 PR RECMPL WND SCALP,EXTR 1.1-2.5 CM: ICD-10-PCS | Mod: S$PBB,51,, | Performed by: DERMATOLOGY

## 2022-11-16 PROCEDURE — 13120 CMPLX RPR S/A/L 1.1-2.5 CM: CPT | Mod: PBBFAC | Performed by: DERMATOLOGY

## 2022-11-16 PROCEDURE — 99499 UNLISTED E&M SERVICE: CPT | Mod: S$PBB,,, | Performed by: DERMATOLOGY

## 2022-11-16 PROCEDURE — 99499 NO LOS: ICD-10-PCS | Mod: S$PBB,,, | Performed by: DERMATOLOGY

## 2022-11-16 NOTE — PROGRESS NOTES
Physical Exam   Skin:            L mid extensor forearm with a 7 x 7 mm pink bx site located 12 cm proximally from the left ulnar base of wrist and 2 cm radially.    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  Diagnosis, photograph, and pathology report were reviewed with patient.  Discussed risks, benefits, and alternatives of Mohs surgery.  Discussed repair options including complex closure, skin flap, skin graft, and second intention healing.  Patient agreed to proceed with Mohs surgery today.      PROCEDURE: Mohs' Micrographic Surgery    INDICATION: Tumor with ill-defined borders.    REFERRING PROVIDER: Teto Tian M.D.    CASE NUMBER:     ANESTHETIC: 3 cc 0.5% Lidocaine with Epi 1:200,000 mixed 1:1 with 0.5% Bupivacaine  and 2 cc 2% Lidocaine with Epinephrine 1:100,000    SURGICAL PREP: Hibiclens    SURGEON: Nora Casanova MD    ASSISTANTS: Urszula Mir PA-C and Monica Martel, Surg Tech    PREOPERATIVE DIAGNOSIS: squamous cell carcinoma    POSTOPERATIVE DIAGNOSIS: squamous cell carcinoma- well differentiated    PATHOLOGIC DIAGNOSIS: squamous cell carcinoma- well differentiated    HISTOLOGY OF SPECIMENS IN FIRST STAGE:   Tumor Type: Tumor seen. Well-differentiated squamous cell carcinoma: Proliferation of squamous cells exhibiting atypia and infiltrating within the dermis.   Depth of Invasion: epidermis and dermis  Perineural Invasion: No    HISTOLOGY OF SPECIMENS IN SUBSEQUENT STAGES:  Tumor Type:  No tumor seen.    STAGES OF MOHS' SURGERY PERFORMED: 2    TUMOR-FREE PLANE ACHIEVED: Yes    HEMOSTASIS: electrocoagulation     SPECIMENS: 4 (2 in stage A and 2 in stage B)    LOCATION: left mid extensor forearm. Location verified with Dr. Tian's clinical photograph. Patient also verified location.    INITIAL LESION SIZE: 0.7 x 0.7 cm    FINAL DEFECT SIZE: 1.4 x 1.6 cm    WOUND REPAIR/DISPOSITION: The patient tolerated Mohs' Micrographic Surgery  for a squamous cell carcinoma very well. When the tumor was completely removed, a repair of the surgical defect was undertaken.    PROCEDURE: Complex Linear Repair    INDICATION: Status post Mohs' Micrographic Surgery for squamous cell carcinoma.    CASE NUMBER:     SURGEON: Nora Casanova MD    ASSISTANTS: Urszula Mir PA-C and Polina Slois    ANESTHETIC: 1 cc 2% Lidocaine with Epinephrine 1:100,000    SURGICAL PREP: Sharon, prepped by  Polina Solis    LOCATION: left mid extensor forearm    DEFECT SIZE: 1.4 x 1.6 cm    WOUND REPAIR/DISPOSITION:  After the patient's carcinoma had been completely removed with Mohs' Micrographic Surgery, a repair of the surgical defect was undertaken. The patient was returned to the operating suite where the area of left mid extensor forearm was prepped, draped, and anesthetized in the usual sterile fashion. The wound was widely undermined in all directions. The wound was undermined to a distance at least the maximum width of the defect as measured perpendicular to the closure line along at least one entire edge of the defect, in this case 2 cm. Then, electrocoagulation was used to obtain meticulous hemostasis. 3-0 Vicryl buried vertical mattress sutures were placed into the subcutaneous and dermal plane to close the wound and ellie the cutaneous wound edge. Bilateral dog ears were identified and were removed by a standard Burow's triangle technique. The cutaneous wound edges were closed using interrupted 4-0 Prolene suture.    The patient tolerated the procedure well.    The area was cleaned and dressed appropriately and the patient was given wound care instructions, as well as appointment for follow-up evaluation and suture removal in 14 days.    LENGTH OF REPAIR: 2 cm    Vitals:    11/16/22 1216 11/16/22 1513   BP: (!) 122/59 (!) 121/56   BP Location: Right arm    Patient Position: Sitting    BP Method: Small (Automatic)    Pulse: 65 60   Weight:  "60.8 kg (134 lb)    Height: 5' 2" (1.575 m)          "

## 2022-11-19 RX ORDER — CEFUROXIME AXETIL 250 MG/1
250 TABLET ORAL 2 TIMES DAILY
Qty: 14 TABLET | Refills: 0 | OUTPATIENT
Start: 2022-11-19 | End: 2022-12-02

## 2022-11-20 NOTE — PROGRESS NOTES
Burning with urination over the weekend. Agreed on starting abx empirically. At this point has had a few UTI's over past 3 to 6 mths. If 1 wk abx course ineffective or recurrence within next couple months, will plan to send to Urology for eval.

## 2022-11-27 ENCOUNTER — HOSPITAL ENCOUNTER (INPATIENT)
Facility: HOSPITAL | Age: 77
LOS: 2 days | Discharge: HOME | DRG: 390 | End: 2022-12-02
Attending: EMERGENCY MEDICINE | Admitting: INTERNAL MEDICINE
Payer: MEDICARE

## 2022-11-27 DIAGNOSIS — K85.90 PANCREATITIS: ICD-10-CM

## 2022-11-27 DIAGNOSIS — R07.9 CHEST PAIN: ICD-10-CM

## 2022-11-27 DIAGNOSIS — R10.33 PERIUMBILICAL ABDOMINAL PAIN: Primary | ICD-10-CM

## 2022-11-27 LAB
ALBUMIN SERPL BCP-MCNC: 3.8 G/DL (ref 3.5–5.2)
ALP SERPL-CCNC: 62 U/L (ref 55–135)
ALT SERPL W/O P-5'-P-CCNC: 28 U/L (ref 10–44)
ANION GAP SERPL CALC-SCNC: 11 MMOL/L (ref 8–16)
AST SERPL-CCNC: 30 U/L (ref 10–40)
BASOPHILS # BLD AUTO: 0.03 K/UL (ref 0–0.2)
BASOPHILS NFR BLD: 0.6 % (ref 0–1.9)
BILIRUB SERPL-MCNC: 0.6 MG/DL (ref 0.1–1)
BUN SERPL-MCNC: 15 MG/DL (ref 8–23)
CALCIUM SERPL-MCNC: 10.2 MG/DL (ref 8.7–10.5)
CHLORIDE SERPL-SCNC: 100 MMOL/L (ref 95–110)
CO2 SERPL-SCNC: 23 MMOL/L (ref 23–29)
CREAT SERPL-MCNC: 1 MG/DL (ref 0.5–1.4)
DIFFERENTIAL METHOD: ABNORMAL
EOSINOPHIL # BLD AUTO: 0.2 K/UL (ref 0–0.5)
EOSINOPHIL NFR BLD: 3.4 % (ref 0–8)
ERYTHROCYTE [DISTWIDTH] IN BLOOD BY AUTOMATED COUNT: 14.5 % (ref 11.5–14.5)
EST. GFR  (NO RACE VARIABLE): 57.3 ML/MIN/1.73 M^2
GLUCOSE SERPL-MCNC: 256 MG/DL (ref 70–110)
HCT VFR BLD AUTO: 36.3 % (ref 37–48.5)
HCV AB SERPL QL IA: NORMAL
HGB BLD-MCNC: 11.9 G/DL (ref 12–16)
HIV 1+2 AB+HIV1 P24 AG SERPL QL IA: NORMAL
IMM GRANULOCYTES # BLD AUTO: 0.02 K/UL (ref 0–0.04)
IMM GRANULOCYTES NFR BLD AUTO: 0.4 % (ref 0–0.5)
LIPASE SERPL-CCNC: 163 U/L (ref 4–60)
LYMPHOCYTES # BLD AUTO: 1.3 K/UL (ref 1–4.8)
LYMPHOCYTES NFR BLD: 26.7 % (ref 18–48)
MCH RBC QN AUTO: 32.5 PG (ref 27–31)
MCHC RBC AUTO-ENTMCNC: 32.8 G/DL (ref 32–36)
MCV RBC AUTO: 99 FL (ref 82–98)
MONOCYTES # BLD AUTO: 0.4 K/UL (ref 0.3–1)
MONOCYTES NFR BLD: 8.9 % (ref 4–15)
NEUTROPHILS # BLD AUTO: 3 K/UL (ref 1.8–7.7)
NEUTROPHILS NFR BLD: 60 % (ref 38–73)
NRBC BLD-RTO: 0 /100 WBC
PLATELET # BLD AUTO: 108 K/UL (ref 150–450)
PMV BLD AUTO: 9.6 FL (ref 9.2–12.9)
POTASSIUM SERPL-SCNC: 4.1 MMOL/L (ref 3.5–5.1)
PROT SERPL-MCNC: 7.3 G/DL (ref 6–8.4)
RBC # BLD AUTO: 3.66 M/UL (ref 4–5.4)
SODIUM SERPL-SCNC: 134 MMOL/L (ref 136–145)
TRIGL SERPL-MCNC: 137 MG/DL (ref 30–150)
WBC # BLD AUTO: 4.95 K/UL (ref 3.9–12.7)

## 2022-11-27 PROCEDURE — 99285 EMERGENCY DEPT VISIT HI MDM: CPT | Mod: ,,, | Performed by: EMERGENCY MEDICINE

## 2022-11-27 PROCEDURE — 63600175 PHARM REV CODE 636 W HCPCS

## 2022-11-27 PROCEDURE — G0378 HOSPITAL OBSERVATION PER HR: HCPCS

## 2022-11-27 PROCEDURE — 85025 COMPLETE CBC W/AUTO DIFF WBC: CPT | Performed by: EMERGENCY MEDICINE

## 2022-11-27 PROCEDURE — 86803 HEPATITIS C AB TEST: CPT | Performed by: PHYSICIAN ASSISTANT

## 2022-11-27 PROCEDURE — 25000003 PHARM REV CODE 250

## 2022-11-27 PROCEDURE — 99285 EMERGENCY DEPT VISIT HI MDM: CPT | Mod: 25

## 2022-11-27 PROCEDURE — 96361 HYDRATE IV INFUSION ADD-ON: CPT

## 2022-11-27 PROCEDURE — 99220 PR INITIAL OBSERVATION CARE,LEVL III: CPT | Mod: ,,, | Performed by: HOSPITALIST

## 2022-11-27 PROCEDURE — 99220 PR INITIAL OBSERVATION CARE,LEVL III: ICD-10-PCS | Mod: ,,, | Performed by: HOSPITALIST

## 2022-11-27 PROCEDURE — 96375 TX/PRO/DX INJ NEW DRUG ADDON: CPT

## 2022-11-27 PROCEDURE — 87389 HIV-1 AG W/HIV-1&-2 AB AG IA: CPT | Performed by: PHYSICIAN ASSISTANT

## 2022-11-27 PROCEDURE — 99285 PR EMERGENCY DEPT VISIT,LEVEL V: ICD-10-PCS | Mod: ,,, | Performed by: EMERGENCY MEDICINE

## 2022-11-27 PROCEDURE — 83690 ASSAY OF LIPASE: CPT | Performed by: EMERGENCY MEDICINE

## 2022-11-27 PROCEDURE — 84478 ASSAY OF TRIGLYCERIDES: CPT | Performed by: EMERGENCY MEDICINE

## 2022-11-27 PROCEDURE — 80053 COMPREHEN METABOLIC PANEL: CPT | Performed by: EMERGENCY MEDICINE

## 2022-11-27 RX ORDER — ONDANSETRON 4 MG/1
4 TABLET, ORALLY DISINTEGRATING ORAL
Status: COMPLETED | OUTPATIENT
Start: 2022-11-27 | End: 2022-11-27

## 2022-11-27 RX ORDER — MORPHINE SULFATE 2 MG/ML
6 INJECTION, SOLUTION INTRAMUSCULAR; INTRAVENOUS ONCE
Status: DISCONTINUED | OUTPATIENT
Start: 2022-11-28 | End: 2022-11-30

## 2022-11-27 RX ORDER — MORPHINE SULFATE 4 MG/ML
4 INJECTION, SOLUTION INTRAMUSCULAR; INTRAVENOUS
Status: COMPLETED | OUTPATIENT
Start: 2022-11-27 | End: 2022-11-27

## 2022-11-27 RX ADMIN — SODIUM CHLORIDE 1000 ML: 0.9 INJECTION, SOLUTION INTRAVENOUS at 09:11

## 2022-11-27 RX ADMIN — MORPHINE SULFATE 4 MG: 4 INJECTION INTRAVENOUS at 09:11

## 2022-11-27 RX ADMIN — ONDANSETRON 4 MG: 4 TABLET, ORALLY DISINTEGRATING ORAL at 09:11

## 2022-11-28 ENCOUNTER — PATIENT MESSAGE (OUTPATIENT)
Dept: INTERNAL MEDICINE | Facility: CLINIC | Age: 77
End: 2022-11-28
Payer: MEDICARE

## 2022-11-28 PROBLEM — R11.0 NAUSEA: Status: RESOLVED | Noted: 2018-05-25 | Resolved: 2022-11-28

## 2022-11-28 PROBLEM — N39.0 UTI (URINARY TRACT INFECTION): Status: RESOLVED | Noted: 2022-02-10 | Resolved: 2022-11-28

## 2022-11-28 PROBLEM — K56.50: Status: ACTIVE | Noted: 2022-03-04

## 2022-11-28 PROBLEM — N13.30 HYDRONEPHROSIS: Status: RESOLVED | Noted: 2018-11-15 | Resolved: 2022-11-28

## 2022-11-28 PROBLEM — K66.0 INTRA-ABDOMINAL ADHESIONS: Chronic | Status: ACTIVE | Noted: 2022-03-04

## 2022-11-28 PROBLEM — R74.8 ELEVATED LIVER ENZYMES: Status: RESOLVED | Noted: 2022-02-11 | Resolved: 2022-11-28

## 2022-11-28 PROBLEM — K56.50: Chronic | Status: ACTIVE | Noted: 2022-03-04

## 2022-11-28 PROBLEM — N30.00 ACUTE CYSTITIS WITHOUT HEMATURIA: Status: RESOLVED | Noted: 2018-11-16 | Resolved: 2022-11-28

## 2022-11-28 PROBLEM — T14.8XXA HEMATOMA: Status: RESOLVED | Noted: 2017-04-21 | Resolved: 2022-11-28

## 2022-11-28 LAB
ALBUMIN SERPL BCP-MCNC: 2.8 G/DL (ref 3.5–5.2)
ALP SERPL-CCNC: 42 U/L (ref 55–135)
ALT SERPL W/O P-5'-P-CCNC: 18 U/L (ref 10–44)
ANION GAP SERPL CALC-SCNC: 9 MMOL/L (ref 8–16)
AST SERPL-CCNC: 23 U/L (ref 10–40)
BASOPHILS # BLD AUTO: 0.03 K/UL (ref 0–0.2)
BASOPHILS NFR BLD: 0.7 % (ref 0–1.9)
BILIRUB SERPL-MCNC: 0.6 MG/DL (ref 0.1–1)
BILIRUB UR QL STRIP: NEGATIVE
BUN SERPL-MCNC: 11 MG/DL (ref 8–23)
CALCIUM SERPL-MCNC: 8.3 MG/DL (ref 8.7–10.5)
CHLORIDE SERPL-SCNC: 110 MMOL/L (ref 95–110)
CLARITY UR REFRACT.AUTO: CLEAR
CO2 SERPL-SCNC: 23 MMOL/L (ref 23–29)
COLOR UR AUTO: NORMAL
CREAT SERPL-MCNC: 0.7 MG/DL (ref 0.5–1.4)
DIFFERENTIAL METHOD: ABNORMAL
EOSINOPHIL # BLD AUTO: 0.2 K/UL (ref 0–0.5)
EOSINOPHIL NFR BLD: 3.5 % (ref 0–8)
ERYTHROCYTE [DISTWIDTH] IN BLOOD BY AUTOMATED COUNT: 14.6 % (ref 11.5–14.5)
EST. GFR  (NO RACE VARIABLE): >60 ML/MIN/1.73 M^2
GLUCOSE SERPL-MCNC: 152 MG/DL (ref 70–110)
GLUCOSE UR QL STRIP: NEGATIVE
HCT VFR BLD AUTO: 29.8 % (ref 37–48.5)
HGB BLD-MCNC: 9.8 G/DL (ref 12–16)
HGB UR QL STRIP: NEGATIVE
IMM GRANULOCYTES # BLD AUTO: 0.01 K/UL (ref 0–0.04)
IMM GRANULOCYTES NFR BLD AUTO: 0.2 % (ref 0–0.5)
KETONES UR QL STRIP: NEGATIVE
LACTATE SERPL-SCNC: 0.9 MMOL/L (ref 0.5–2.2)
LACTATE SERPL-SCNC: 1.7 MMOL/L (ref 0.5–2.2)
LEUKOCYTE ESTERASE UR QL STRIP: NEGATIVE
LYMPHOCYTES # BLD AUTO: 1.4 K/UL (ref 1–4.8)
LYMPHOCYTES NFR BLD: 30.7 % (ref 18–48)
MAGNESIUM SERPL-MCNC: 1.2 MG/DL (ref 1.6–2.6)
MCH RBC QN AUTO: 32.3 PG (ref 27–31)
MCHC RBC AUTO-ENTMCNC: 32.9 G/DL (ref 32–36)
MCV RBC AUTO: 98 FL (ref 82–98)
MONOCYTES # BLD AUTO: 0.6 K/UL (ref 0.3–1)
MONOCYTES NFR BLD: 12.6 % (ref 4–15)
NEUTROPHILS # BLD AUTO: 2.4 K/UL (ref 1.8–7.7)
NEUTROPHILS NFR BLD: 52.3 % (ref 38–73)
NITRITE UR QL STRIP: NEGATIVE
NRBC BLD-RTO: 0 /100 WBC
PH UR STRIP: 6 [PH] (ref 5–8)
PHOSPHATE SERPL-MCNC: 3.4 MG/DL (ref 2.7–4.5)
PLATELET # BLD AUTO: 95 K/UL (ref 150–450)
PMV BLD AUTO: 11.1 FL (ref 9.2–12.9)
POCT GLUCOSE: 144 MG/DL (ref 70–110)
POTASSIUM SERPL-SCNC: 3.9 MMOL/L (ref 3.5–5.1)
PROT SERPL-MCNC: 5.2 G/DL (ref 6–8.4)
PROT UR QL STRIP: NEGATIVE
RBC # BLD AUTO: 3.03 M/UL (ref 4–5.4)
SODIUM SERPL-SCNC: 142 MMOL/L (ref 136–145)
SP GR UR STRIP: 1 (ref 1–1.03)
URN SPEC COLLECT METH UR: NORMAL
WBC # BLD AUTO: 4.6 K/UL (ref 3.9–12.7)

## 2022-11-28 PROCEDURE — 63600175 PHARM REV CODE 636 W HCPCS

## 2022-11-28 PROCEDURE — 96375 TX/PRO/DX INJ NEW DRUG ADDON: CPT

## 2022-11-28 PROCEDURE — 96366 THER/PROPH/DIAG IV INF ADDON: CPT

## 2022-11-28 PROCEDURE — 85025 COMPLETE CBC W/AUTO DIFF WBC: CPT | Performed by: HOSPITALIST

## 2022-11-28 PROCEDURE — G0378 HOSPITAL OBSERVATION PER HR: HCPCS

## 2022-11-28 PROCEDURE — 84100 ASSAY OF PHOSPHORUS: CPT | Performed by: HOSPITALIST

## 2022-11-28 PROCEDURE — 94761 N-INVAS EAR/PLS OXIMETRY MLT: CPT

## 2022-11-28 PROCEDURE — 99225 PR SUBSEQUENT OBSERVATION CARE,LEVEL II: CPT | Mod: ,,,

## 2022-11-28 PROCEDURE — 25000003 PHARM REV CODE 250: Performed by: HOSPITALIST

## 2022-11-28 PROCEDURE — 96372 THER/PROPH/DIAG INJ SC/IM: CPT | Performed by: HOSPITALIST

## 2022-11-28 PROCEDURE — 99225 PR SUBSEQUENT OBSERVATION CARE,LEVEL II: ICD-10-PCS | Mod: ,,,

## 2022-11-28 PROCEDURE — 63600175 PHARM REV CODE 636 W HCPCS: Performed by: HOSPITALIST

## 2022-11-28 PROCEDURE — 83735 ASSAY OF MAGNESIUM: CPT | Performed by: HOSPITALIST

## 2022-11-28 PROCEDURE — 81003 URINALYSIS AUTO W/O SCOPE: CPT | Performed by: HOSPITALIST

## 2022-11-28 PROCEDURE — 25000003 PHARM REV CODE 250

## 2022-11-28 PROCEDURE — 83605 ASSAY OF LACTIC ACID: CPT | Mod: 91 | Performed by: HOSPITALIST

## 2022-11-28 PROCEDURE — 96365 THER/PROPH/DIAG IV INF INIT: CPT

## 2022-11-28 PROCEDURE — 83605 ASSAY OF LACTIC ACID: CPT | Performed by: HOSPITALIST

## 2022-11-28 PROCEDURE — 96376 TX/PRO/DX INJ SAME DRUG ADON: CPT

## 2022-11-28 PROCEDURE — 80053 COMPREHEN METABOLIC PANEL: CPT | Performed by: HOSPITALIST

## 2022-11-28 RX ORDER — DULOXETIN HYDROCHLORIDE 30 MG/1
30 CAPSULE, DELAYED RELEASE ORAL 2 TIMES DAILY
Status: DISCONTINUED | OUTPATIENT
Start: 2022-11-28 | End: 2022-12-02 | Stop reason: HOSPADM

## 2022-11-28 RX ORDER — IBUPROFEN 200 MG
16 TABLET ORAL
Status: DISCONTINUED | OUTPATIENT
Start: 2022-11-28 | End: 2022-12-02 | Stop reason: HOSPADM

## 2022-11-28 RX ORDER — SODIUM CHLORIDE 9 MG/ML
INJECTION, SOLUTION INTRAVENOUS CONTINUOUS
Status: DISCONTINUED | OUTPATIENT
Start: 2022-11-28 | End: 2022-11-28

## 2022-11-28 RX ORDER — SODIUM CHLORIDE, SODIUM LACTATE, POTASSIUM CHLORIDE, CALCIUM CHLORIDE 600; 310; 30; 20 MG/100ML; MG/100ML; MG/100ML; MG/100ML
INJECTION, SOLUTION INTRAVENOUS CONTINUOUS
Status: ACTIVE | OUTPATIENT
Start: 2022-11-28 | End: 2022-11-28

## 2022-11-28 RX ORDER — NALOXONE HCL 0.4 MG/ML
0.02 VIAL (ML) INJECTION
Status: DISCONTINUED | OUTPATIENT
Start: 2022-11-28 | End: 2022-12-02 | Stop reason: HOSPADM

## 2022-11-28 RX ORDER — LEVOTHYROXINE SODIUM 75 UG/1
75 TABLET ORAL
COMMUNITY
End: 2023-02-10 | Stop reason: SDUPTHER

## 2022-11-28 RX ORDER — ATORVASTATIN CALCIUM 40 MG/1
40 TABLET, FILM COATED ORAL DAILY
COMMUNITY

## 2022-11-28 RX ORDER — ACETAMINOPHEN 325 MG/1
1300 TABLET ORAL 2 TIMES DAILY PRN
Status: DISCONTINUED | OUTPATIENT
Start: 2022-11-28 | End: 2022-12-02 | Stop reason: HOSPADM

## 2022-11-28 RX ORDER — ENOXAPARIN SODIUM 100 MG/ML
40 INJECTION SUBCUTANEOUS EVERY 24 HOURS
Status: DISCONTINUED | OUTPATIENT
Start: 2022-11-28 | End: 2022-12-02 | Stop reason: HOSPADM

## 2022-11-28 RX ORDER — MAGNESIUM SULFATE 1 G/100ML
1 INJECTION INTRAVENOUS ONCE
Status: COMPLETED | OUTPATIENT
Start: 2022-11-28 | End: 2022-11-28

## 2022-11-28 RX ORDER — ONDANSETRON 2 MG/ML
4 INJECTION INTRAMUSCULAR; INTRAVENOUS EVERY 8 HOURS PRN
Status: DISCONTINUED | OUTPATIENT
Start: 2022-11-28 | End: 2022-12-02 | Stop reason: HOSPADM

## 2022-11-28 RX ORDER — INSULIN ASPART 100 [IU]/ML
0-5 INJECTION, SOLUTION INTRAVENOUS; SUBCUTANEOUS
Status: DISCONTINUED | OUTPATIENT
Start: 2022-11-28 | End: 2022-12-02 | Stop reason: HOSPADM

## 2022-11-28 RX ORDER — MORPHINE SULFATE 4 MG/ML
4 INJECTION, SOLUTION INTRAMUSCULAR; INTRAVENOUS EVERY 4 HOURS PRN
Status: DISCONTINUED | OUTPATIENT
Start: 2022-11-28 | End: 2022-11-29

## 2022-11-28 RX ORDER — ASCORBIC ACID 500 MG
1000 TABLET ORAL DAILY
Status: DISCONTINUED | OUTPATIENT
Start: 2022-11-28 | End: 2022-12-02 | Stop reason: HOSPADM

## 2022-11-28 RX ORDER — MONTELUKAST SODIUM 10 MG/1
10 TABLET ORAL DAILY
Status: DISCONTINUED | OUTPATIENT
Start: 2022-11-28 | End: 2022-12-02 | Stop reason: HOSPADM

## 2022-11-28 RX ORDER — AMOXICILLIN 250 MG
1 CAPSULE ORAL DAILY PRN
Status: DISCONTINUED | OUTPATIENT
Start: 2022-11-28 | End: 2022-12-01

## 2022-11-28 RX ORDER — PANTOPRAZOLE SODIUM 20 MG/1
20 TABLET, DELAYED RELEASE ORAL DAILY
COMMUNITY
End: 2022-12-14 | Stop reason: SDUPTHER

## 2022-11-28 RX ORDER — MAGNESIUM SULFATE HEPTAHYDRATE 40 MG/ML
2 INJECTION, SOLUTION INTRAVENOUS ONCE
Status: COMPLETED | OUTPATIENT
Start: 2022-11-28 | End: 2022-11-28

## 2022-11-28 RX ORDER — DONEPEZIL HYDROCHLORIDE 5 MG/1
10 TABLET, FILM COATED ORAL NIGHTLY
Status: DISCONTINUED | OUTPATIENT
Start: 2022-11-28 | End: 2022-12-02 | Stop reason: HOSPADM

## 2022-11-28 RX ORDER — SODIUM CHLORIDE 0.9 % (FLUSH) 0.9 %
10 SYRINGE (ML) INJECTION EVERY 6 HOURS PRN
Status: DISCONTINUED | OUTPATIENT
Start: 2022-11-28 | End: 2022-12-02 | Stop reason: HOSPADM

## 2022-11-28 RX ORDER — IBUPROFEN 200 MG
24 TABLET ORAL
Status: DISCONTINUED | OUTPATIENT
Start: 2022-11-28 | End: 2022-12-02 | Stop reason: HOSPADM

## 2022-11-28 RX ORDER — POLYETHYLENE GLYCOL 3350 17 G/17G
17 POWDER, FOR SOLUTION ORAL 2 TIMES DAILY PRN
Status: DISCONTINUED | OUTPATIENT
Start: 2022-11-28 | End: 2022-12-01

## 2022-11-28 RX ORDER — GLUCAGON 1 MG
1 KIT INJECTION
Status: DISCONTINUED | OUTPATIENT
Start: 2022-11-28 | End: 2022-12-02 | Stop reason: HOSPADM

## 2022-11-28 RX ORDER — TALC
6 POWDER (GRAM) TOPICAL NIGHTLY PRN
Status: DISCONTINUED | OUTPATIENT
Start: 2022-11-28 | End: 2022-12-02 | Stop reason: HOSPADM

## 2022-11-28 RX ADMIN — Medication 1000 MG: at 08:11

## 2022-11-28 RX ADMIN — DULOXETINE 30 MG: 30 CAPSULE, DELAYED RELEASE ORAL at 08:11

## 2022-11-28 RX ADMIN — MONTELUKAST 10 MG: 10 TABLET, FILM COATED ORAL at 08:11

## 2022-11-28 RX ADMIN — ENOXAPARIN SODIUM 40 MG: 40 INJECTION SUBCUTANEOUS at 04:11

## 2022-11-28 RX ADMIN — MORPHINE SULFATE 4 MG: 4 INJECTION INTRAVENOUS at 04:11

## 2022-11-28 RX ADMIN — MORPHINE SULFATE 4 MG: 4 INJECTION INTRAVENOUS at 08:11

## 2022-11-28 RX ADMIN — MORPHINE SULFATE 4 MG: 4 INJECTION INTRAVENOUS at 11:11

## 2022-11-28 RX ADMIN — DONEPEZIL HYDROCHLORIDE 10 MG: 5 TABLET, FILM COATED ORAL at 08:11

## 2022-11-28 RX ADMIN — MORPHINE SULFATE 4 MG: 4 INJECTION INTRAVENOUS at 12:11

## 2022-11-28 RX ADMIN — MAGNESIUM SULFATE HEPTAHYDRATE 1 G: 500 INJECTION, SOLUTION INTRAMUSCULAR; INTRAVENOUS at 03:11

## 2022-11-28 RX ADMIN — SODIUM CHLORIDE, SODIUM LACTATE, POTASSIUM CHLORIDE, AND CALCIUM CHLORIDE: .6; .31; .03; .02 INJECTION, SOLUTION INTRAVENOUS at 01:11

## 2022-11-28 RX ADMIN — MAGNESIUM SULFATE 2 G: 2 INJECTION INTRAVENOUS at 01:11

## 2022-11-28 NOTE — ED PROVIDER NOTES
Encounter Date: 2022       History     Chief Complaint   Patient presents with    Abdominal Pain     Hx blockage    Melena     X 1 month      Pt is a 77 year old female with PMHx significant for DM, colon cancer s/p transverse colectomy, and recurrent SBO 2/2 adhesions who presents for evaluation of periumbilical abdominal pain, which began 1 week ago but worsened significantly today. Pt notes an associated nausea, black stools, and decreased bowel movements. States symptoms are similar to previous bowel obstructions. No fever, chills, chest pain, vomiting, numbness, or weakness.     The history is provided by the patient and a relative.   Review of patient's allergies indicates:   Allergen Reactions    Codeine Itching and Nausea And Vomiting    Dilaudid [hydromorphone (bulk)] Other (See Comments)     Oversedating, head burning. Pt prefers to avoid.       Percocet [oxycodone-acetaminophen] Itching    Sulfa (sulfonamide antibiotics) Itching and Nausea And Vomiting           Latex, natural rubber Rash     Past Medical History:   Diagnosis Date    Abdominal pain     Allergic rhinitis     Arthritis     Blood platelet disorder     Blood platelet disorder     Blood transfusion     during delivery and     Bowel obstruction     Cervical radiculopathy     followed by dr cloud    Colon cancer     transverse colon; resected; Stage IIA (pT3 pN0 MX)    Degenerative joint disease (DJD) of lumbar spine     Diabetes mellitus     Diarrhea     Falls 2020    Family history of breast cancer     Family history of colon cancer     Fatty liver     GERD (gastroesophageal reflux disease)     History of shingles     Hyperlipidemia     Hypomagnesemia     Hypothyroidism     Irritable bowel syndrome     Microscopic colitis     treated     Migraine     Myelodysplastic syndrome     Raynaud phenomenon     Raynaud's disease     Squamous cell carcinoma of skin     Type 2 diabetes mellitus      Past Surgical History:    Procedure Laterality Date    ADENOIDECTOMY      APPENDECTOMY      BACK SURGERY      CARPAL TUNNEL RELEASE      bilateral      SECTION      CHOLECYSTECTOMY  1965    COLECTOMY  2011    Transverse colon resection by Dr. Aguirre    COLONOSCOPY N/A 2017    Procedure: COLONOSCOPY;  Surgeon: Manjit Alvarez MD;  Location: [facility] (4TH FLR);  Service: Endoscopy;  Laterality: N/A;    COLONOSCOPY N/A 2019    Procedure: COLONOSCOPY;  Surgeon: Ramrio Jefferson MD;  Location: [facility] (4TH FLR);  Service: Endoscopy;  Laterality: N/A;  PM prep    COLONOSCOPY N/A 2022    Procedure: COLONOSCOPY;  Surgeon: Ramiro Jefferson MD;  Location: [facility] (2ND FLR);  Service: Endoscopy;  Laterality: N/A;  EGD and colonoscopy in 6-8 weeks from now     states has constipation. -extended Golytely prep    CYSTOSCOPY N/A 2021    Procedure: CYSTOSCOPY;  Surgeon: Viridiana Valenzuela MD;  Location: [facility] 1ST FLR;  Service: Urology;  Laterality: N/A;    ENDOSCOPIC ULTRASOUND OF UPPER GASTROINTESTINAL TRACT N/A 2018    Procedure: ULTRASOUND, UPPER GI TRACT, ENDOSCOPIC;  Surgeon: Jose Hess MD;  Location: [facility];  Service: Endoscopy;  Laterality: N/A;    ENDOSCOPIC ULTRASOUND OF UPPER GASTROINTESTINAL TRACT N/A 2019    Procedure: ULTRASOUND, UPPER GI TRACT, ENDOSCOPIC;  Surgeon: Jose Hess MD;  Location: [facility] (2ND FLR);  Service: Endoscopy;  Laterality: N/A;    ESOPHAGOGASTRODUODENOSCOPY N/A 2018    Procedure: EGD (ESOPHAGOGASTRODUODENOSCOPY);  Surgeon: Angelo Reynolds MD;  Location: [facility] (2ND FLR);  Service: Endoscopy;  Laterality: N/A;    ESOPHAGOGASTRODUODENOSCOPY N/A 2019    Procedure: EGD (ESOPHAGOGASTRODUODENOSCOPY);  Surgeon: Ramiro Jefferson MD;  Location: [facility] (4TH FLR);  Service: Endoscopy;  Laterality: N/A;    ESOPHAGOGASTRODUODENOSCOPY N/A 2022    Procedure: EGD (ESOPHAGOGASTRODUODENOSCOPY);  Surgeon: Ramiro Jefferson MD;  Location:  [facility] ENDO (2ND FLR);  Service: Endoscopy;  Laterality: N/A;  fully vaccinated-GT    EYE SURGERY      Cataract Removal    FLUOROSCOPIC URODYNAMIC STUDY N/A 11/09/2021    Procedure: URODYNAMIC STUDY, FLUOROSCOPIC;  Surgeon: Viridiana Valenzuela MD;  Location: [facility] OR 1ST FLR;  Service: Urology;  Laterality: N/A;  90 minutes     HYSTERECTOMY      JOINT REPLACEMENT      posterolateral fusion with autograft bone and [place] mineralized bone matrix  02/01/2013    at [facility] for lumbar spine stenosis    SMALL INTESTINE SURGERY      SPINE SURGERY      TOE SURGERY      TONSILLECTOMY      TRIGGER FINGER RELEASE       Family History   Problem Relation Age of Onset    Heart disease Father 50        Mi age 50    Colon cancer Father     Bladder Cancer Mother         non smoker    Cataracts Mother     Glaucoma Mother     Heart disease Mother     Hyperlipidemia Mother     Kidney disease Mother     Breast cancer Sister 79    Arthritis Daughter     Asthma Daughter     Depression Daughter     Hypertension Daughter     Stroke Daughter 40    Arthritis Brother     Colon cancer Brother 70    Alcohol abuse Brother     Parkinsonism Brother     Alcohol abuse Brother     Depression Daughter     Stroke Daughter     Alcohol abuse Brother     Arthritis Brother     Asthma Daughter     Depression Daughter     Celiac disease Neg Hx     Cirrhosis Neg Hx     Colon polyps Neg Hx     Crohn's disease Neg Hx     Cystic fibrosis Neg Hx     Esophageal cancer Neg Hx     Hemochromatosis Neg Hx     Inflammatory bowel disease Neg Hx     Irritable bowel syndrome Neg Hx     Liver cancer Neg Hx     Liver disease Neg Hx     Rectal cancer Neg Hx     Stomach cancer Neg Hx     Ulcerative colitis Neg Hx     Eliazar's disease Neg Hx     Amblyopia Neg Hx     Blindness Neg Hx     Macular degeneration Neg Hx     Retinal detachment Neg Hx     Strabismus Neg Hx     Melanoma Neg Hx      Social History     Tobacco Use    Smoking status: Never    Smokeless tobacco: Never   Substance  Use Topics    Alcohol use: Not Currently     Comment: socially, rarely    Drug use: Never     Review of Systems   Constitutional:  Negative for chills and fever.   HENT:  Negative for ear discharge and ear pain.    Eyes:  Negative for photophobia and visual disturbance.   Respiratory:  Negative for cough and shortness of breath.    Cardiovascular:  Negative for chest pain and palpitations.   Gastrointestinal:  Positive for abdominal pain, constipation and nausea. Negative for diarrhea and vomiting.   Genitourinary:  Negative for dysuria and frequency.   Musculoskeletal:  Negative for back pain and neck pain.   Skin:  Negative for rash and wound.   Neurological:  Negative for weakness and numbness.     Physical Exam     Initial Vitals [11/27/22 1823]   BP Pulse Resp Temp SpO2   139/61 76 20 98 °F (36.7 °C) 96 %      MAP       --         Physical Exam    Nursing note and vitals reviewed.  Constitutional: She appears well-developed and well-nourished. No distress.   HENT:   Head: Normocephalic and atraumatic.   Eyes: Conjunctivae and EOM are normal.   Neck: Neck supple. No tracheal deviation present.   Cardiovascular:  Normal rate, regular rhythm and intact distal pulses.           No murmur heard.  Pulmonary/Chest: Breath sounds normal. No stridor. No respiratory distress. She has no wheezes. She has no rales.   Abdominal: Abdomen is soft. She exhibits no distension. There is abdominal tenderness.   Musculoskeletal:         General: No edema. Normal range of motion.      Cervical back: Neck supple.     Neurological: She is alert and oriented to person, place, and time.   Skin: Skin is warm and dry. Capillary refill takes less than 2 seconds.       ED Course   Procedures  Labs Reviewed   CBC W/ AUTO DIFFERENTIAL - Abnormal; Notable for the following components:       Result Value    RBC 3.66 (*)     Hemoglobin 11.9 (*)     Hematocrit 36.3 (*)     MCV 99 (*)     MCH 32.5 (*)     Platelets 108 (*)     All other  components within normal limits   COMPREHENSIVE METABOLIC PANEL - Abnormal; Notable for the following components:    Sodium 134 (*)     Glucose 256 (*)     eGFR 57.3 (*)     All other components within normal limits   LIPASE - Abnormal; Notable for the following components:    Lipase 163 (*)     All other components within normal limits   HIV 1 / 2 ANTIBODY    Narrative:     Release to patient->Immediate   HEPATITIS C ANTIBODY    Narrative:     Release to patient->Immediate   TRIGLYCERIDES   TRIGLYCERIDES    Narrative:     ADD ON TRIGLYCERIDES PER DR LIV PATEL/ORDER# [id number] @ 23:10   URINALYSIS, REFLEX TO URINE CULTURE   LACTIC ACID, PLASMA          Imaging Results              CT Abdomen Pelvis  Without Contrast (Final result)  Result time 11/27/22 21:13:58      Final result by Horacio Cornelius MD (11/27/22 21:13:58)                   Impression:      Persistent mild distension of bowel loops interspersed between ARIAS staple lines of previous small intestine surgery.    No new distension or wall thickening to suggest acute obstruction or inflammation.    Persistent Pilar caliectasis of the kidneys without focal point of obstruction identified.      Electronically signed by: Horacio Cornelius  Date:    11/27/2022  Time:    21:13               Narrative:    EXAMINATION:  CT ABDOMEN PELVIS WITHOUT CONTRAST    CLINICAL HISTORY:  concern for bowel obstruction;    TECHNIQUE:  Low dose axial images, sagittal and coronal reformations were obtained from the lung bases to the pubic symphysis.  Oral contrast was not administered.    COMPARISON:  CT abdomen and pelvis, 06/07/2022    FINDINGS:  The dilated loop of small bowel near anastomotic ARIAS staple line.  There is no new wall thickening or increased gaseous distension.  No impaction is evident.  Waste material is noted to the rectum.    The liver, spleen, pancreas and kidneys appear stable.  Pilar caliectasis of both kidneys without focal point of obstruction is  again noted.  The urinary bladder appears unremarkable.  No retroperitoneal mass or lymph node enlargement is evident.  Atherosclerotic calcifications in normal caliber aorta remain.    Base of the heart pericardium appear stable.  Lung bases appear clear with no infiltrate nodule or effusion.    Degenerative changes throughout the spine with anterolisthesis of L5 on S1 are noted.  No lytic or blastic changes have become apparent.                                       Medications   morphine injection 6 mg (0 mg Intravenous Hold 11/28/22 0000)   sodium chloride 0.9% bolus 1,000 mL (0 mLs Intravenous Stopped 11/27/22 2254)   ondansetron disintegrating tablet 4 mg (4 mg Oral Given 11/27/22 2121)   morphine injection 4 mg (4 mg Intravenous Given 11/27/22 2121)     Medical Decision Making:   History:   Old Medical Records: I decided to obtain old medical records.  Initial Assessment:   Pt is a 77 year old female with PMHx significant for DM, colon cancer s/p transverse colectomy, and recurrent SBO 2/2 adhesions who presents for evaluation of periumbilical abdominal pain, which began 1 week ago but worsened significantly today.  Differential Diagnosis:   SBO, ileus, colitis, diverticulitis, pancreatitis, nephrolithiasis, electrolyte abnormality   Clinical Tests:   Lab Tests: Ordered and Reviewed  Radiological Study: Ordered and Reviewed  ED Management:  CT abdomen//pelvis without evidence of bowel obstruction. Lipase elevated at 160. Concern for mild pancreatitis as etiology of pt's symptoms. Pt treated in the ED with IV fluids, morphine, and zofran with improvement in symptoms. Plan to admit for further management and evaluation.                         Clinical Impression:   Final diagnoses:  [K85.90] Pancreatitis      ED Disposition Condition    Observation Stable                JrUlysses Newell MD  Resident  11/28/22 0048

## 2022-11-28 NOTE — NURSING
Patient arrived to room 1153 in stretcher with transport.  Patient is a,a,ox4, VSS and has complaints of abdominal pain at this time.  Patient now resting in bed locked in lowest position, side rails up x3, and call bell in reach.  Will continue to monitor.

## 2022-11-28 NOTE — ASSESSMENT & PLAN NOTE
Chronic issue related to prior abdominal surgeries and extensive scar tissue  -per recent surgery notes and during last attempted ex-lap in March 2022 - patient with entrapment of abdominal contents and abdominal tissue with increased bleeding during attempted surgery.  Patient is not  A surgical candidate for abdominal procedure unless she were to have life threatening condition and even then is very high bleeding risk per her primary surgeon.

## 2022-11-28 NOTE — ASSESSMENT & PLAN NOTE
Mild - followed by hematology on no chronic therapies  -has mild thrombocytopenia, stable mild anemia

## 2022-11-28 NOTE — ED NOTES
Pt ambulated to bathroom without nurses knowledge, unable to collect urine sample. Pt lying in bed, respirations even, unlabored, NAD noted, answering questions appropriately. Family at bedside will continue to monitor

## 2022-11-28 NOTE — H&P
[facility] - Emergency Dept  [facility]  History & Physical    Patient Name: Anayeli Judd  MRN: [id number]  Patient Class: OP- Observation  Admission Date: 11/27/2022  Attending Physician: Sami Dougherty MD [facility]  Admitting Physician: Raymond Mujica MD  Primary Care Provider: Darci Guthrie MD         Patient information was obtained from patient, relative(s), past medical records and ER records.     Subjective:     Principal Problem:Periumbilical abdominal pain    Chief Complaint:   Chief Complaint   Patient presents with    Abdominal Pain     Hx blockage    Melena     X 1 month         HPI: 77-year-old woman with a history of colon cancer status post transverse colectomy, severe abdominal wall adhesions 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  She reports that over the past week she has had worsening and persistent periumbilical abdominal pain.  Associated with the pain is abdominal distension and bloating sensation, nausea without vomiting.  She reports having chronic diarrhea generally occurs postprandially and has chronic dark appearing stool she describes as consistency of coffee-grounds.    Abdominal pain is described as spasms often occurring after meals episodes last minutes at a time at home she was not taking oral medications is on no chronic oral opiate therapy, states she has a higher pain tolerance and due to previous bouts of abdominal pain and prior bowel obstructions tries to moderate when she may seek medical care for worsening pain, pain rated as 10/10 very severe earlier today, status post 1 dose of morphine she rates her pain at approximately 8/10, usually states that IV morphine dose will eliminate pain.  She does not have any appetite changes she has new sitters who prepare food for her and if anything she has been eating more  food lately.    She also has chronic bowel incontinence, managed with pads or wearing adult diaper.  She has no medication changes.  For her bowel habits she reports frequently having to have a bowel movement after any oral intake however she states that despite having loose postprandial bowel movements she often feels she has not emptied her bowels after or and fecal urgency symptoms persist.     She has followed with Dr. Barriga in surgery Clinic as well as Dr. Jefferson in GI clinic, is on Cymbalta for chronic pain has previously been on gabapentin for extended period of time but had to discontinue due to side effects or intolerance.  She reports being evaluated as an outpatient for possible procedures for treatment of incontinence but states she was not a candidate for them    ER referral for admission was for concern of pancreatitis she does have a elevated lipase of 163 a CT abdomen pelvis noncontrast completed without any pancreatic inflammation noted, also no signs of bowel obstruction but dilated loops of intestine were present      Past Medical History:   Diagnosis Date    Abdominal pain     Allergic rhinitis     Arthritis     Blood platelet disorder     Blood platelet disorder     Blood transfusion     during delivery and     Bowel obstruction     Cervical radiculopathy     followed by dr cloud    Colon cancer     transverse colon; resected; Stage IIA (pT3 pN0 MX)    Degenerative joint disease (DJD) of lumbar spine     Diabetes mellitus     Diarrhea     Falls 2020    Family history of breast cancer     Family history of colon cancer     Fatty liver     GERD (gastroesophageal reflux disease)     History of shingles     Hyperlipidemia     Hypomagnesemia     Hypothyroidism     Irritable bowel syndrome     Microscopic colitis     treated     Migraine     Myelodysplastic syndrome     Raynaud phenomenon     Raynaud's disease     Squamous cell carcinoma of skin      Type 2 diabetes mellitus        Past Surgical History:   Procedure Laterality Date    ADENOIDECTOMY      APPENDECTOMY      BACK SURGERY      CARPAL TUNNEL RELEASE      bilateral      SECTION      CHOLECYSTECTOMY  1965    COLECTOMY  2011    Transverse colon resection by Dr. Aguirre    COLONOSCOPY N/A 2017    Procedure: COLONOSCOPY;  Surgeon: Manjit Alvarez MD;  Location: [facility] (4TH FLR);  Service: Endoscopy;  Laterality: N/A;    COLONOSCOPY N/A 2019    Procedure: COLONOSCOPY;  Surgeon: Ramiro Jefferson MD;  Location: [facility] (4TH FLR);  Service: Endoscopy;  Laterality: N/A;  PM prep    COLONOSCOPY N/A 2022    Procedure: COLONOSCOPY;  Surgeon: Ramiro Jefferson MD;  Location: [facility] (2ND FLR);  Service: Endoscopy;  Laterality: N/A;  EGD and colonoscopy in 6-8 weeks from now     states has constipation. -extended Golytely prep    CYSTOSCOPY N/A 2021    Procedure: CYSTOSCOPY;  Surgeon: Viridiana Valenzuela MD;  Location: [facility] 1ST FLR;  Service: Urology;  Laterality: N/A;    ENDOSCOPIC ULTRASOUND OF UPPER GASTROINTESTINAL TRACT N/A 2018    Procedure: ULTRASOUND, UPPER GI TRACT, ENDOSCOPIC;  Surgeon: Jose Hess MD;  Location: [facility];  Service: Endoscopy;  Laterality: N/A;    ENDOSCOPIC ULTRASOUND OF UPPER GASTROINTESTINAL TRACT N/A 2019    Procedure: ULTRASOUND, UPPER GI TRACT, ENDOSCOPIC;  Surgeon: Jose Hess MD;  Location: [facility] (2ND FLR);  Service: Endoscopy;  Laterality: N/A;    ESOPHAGOGASTRODUODENOSCOPY N/A 2018    Procedure: EGD (ESOPHAGOGASTRODUODENOSCOPY);  Surgeon: Angelo Reynolds MD;  Location: [facility] (2ND FLR);  Service: Endoscopy;  Laterality: N/A;    ESOPHAGOGASTRODUODENOSCOPY N/A 2019    Procedure: EGD (ESOPHAGOGASTRODUODENOSCOPY);  Surgeon: Ramiro Jefferson MD;  Location: [facility] (4TH FLR);  Service: Endoscopy;  Laterality: N/A;    ESOPHAGOGASTRODUODENOSCOPY N/A 2022    Procedure: EGD  (ESOPHAGOGASTRODUODENOSCOPY);  Surgeon: Ramiro Jefferson MD;  Location: [facility] ENDO (2ND FLR);  Service: Endoscopy;  Laterality: N/A;  fully vaccinated-GT    EYE SURGERY      Cataract Removal    FLUOROSCOPIC URODYNAMIC STUDY N/A 11/09/2021    Procedure: URODYNAMIC STUDY, FLUOROSCOPIC;  Surgeon: Viridiana Valenzuela MD;  Location: [facility] OR 1ST FLR;  Service: Urology;  Laterality: N/A;  90 minutes     HYSTERECTOMY      JOINT REPLACEMENT      posterolateral fusion with autograft bone and Eun mineralized bone matrix  02/01/2013    at [facility] for lumbar spine stenosis    SMALL INTESTINE SURGERY      SPINE SURGERY      TOE SURGERY      TONSILLECTOMY      TRIGGER FINGER RELEASE         Review of patient's allergies indicates:   Allergen Reactions    Codeine Itching and Nausea And Vomiting    Dilaudid [hydromorphone (bulk)] Other (See Comments)     Oversedating, head burning. Pt prefers to avoid.       Percocet [oxycodone-acetaminophen] Itching    Sulfa (sulfonamide antibiotics) Itching and Nausea And Vomiting           Latex, natural rubber Rash       No current facility-administered medications on file prior to encounter.     Current Outpatient Medications on File Prior to Encounter   Medication Sig    acetaminophen (TYLENOL) 650 MG TbSR Take 1,300 mg by mouth 2 (two) times a day.    ascorbic acid, vitamin C, (VITAMIN C) 1000 MG tablet Take 1,000 mg by mouth once daily.    biotin 10,000 mcg TbDL Take 1 tablet by mouth once daily.     calcium-vitamin D3 (OS-KASSANDRA 500 + D3) 500 mg-5 mcg (200 unit) per tablet Take 1 tablet by mouth once daily.    conjugated estrogens (PREMARIN) vaginal cream INSERT 1/2 GRAM VAGINALLY  TWICE WEEKLY    cranberry extract 200 mg Cap Take 1 capsule by mouth once daily.    donepeziL (ARICEPT) 10 MG tablet Take 1 tablet (10 mg total) by mouth every evening.    DULoxetine (CYMBALTA) 30 MG capsule TAKE 1 CAPSULE BY MOUTH  TWICE DAILY (Patient taking differently: Take 30 mg by mouth 2  (two) times daily.)    ferrous sulfate 324 mg (65 mg iron) TbEC Take 1 tablet (324 mg total) by mouth once daily.    magnesium glycinate 100 mg Tab Take 500 mg by mouth once daily at 6am. (Patient taking differently: Take 1 tablet by mouth once daily at 6am.)    metFORMIN (GLUCOPHAGE) 500 MG tablet TAKE 1 TABLET(500 MG) BY MOUTH DAILY WITH BREAKFAST    montelukast (SINGULAIR) 10 mg tablet TAKE 1 TABLET BY MOUTH  DAILY    ondansetron (ZOFRAN-ODT) 8 MG TbDL Take 1 tablet (8 mg total) by mouth every 8 (eight) hours as needed (nausea).    polyethylene glycol (GLYCOLAX) 17 gram/dose powder Take 17 g by mouth daily as needed (Constipation).    promethazine-codeine 6.25-10 mg/5 ml (PHENERGAN WITH CODEINE) 6.25-10 mg/5 mL syrup Take 5 mLs by mouth every 6 (six) hours as needed for Cough.    vitamin D 1000 units Tab Take 1,000 Units by mouth once daily. D3    flash glucose scanning reader (FREESTYLE EFREN 14 DAY READER) Misc Check blood glucose level 3 times daily.    flash glucose sensor (FREESTYLE EFREN 14 DAY SENSOR) Kit 1 application by Misc.(Non-Drug; Combo Route) route every 14 (fourteen) days. Disp 30 or 90 day refill    hydrocortisone 2.5 % cream Apply topically 2 (two) times daily as needed.    LIDOcaine (LIDODERM) 5 % Place 1 patch onto the skin once daily. Remove & Discard patch within 12 hours or as directed by MD    triamcinolone acetonide 0.1% (KENALOG) 0.1 % paste APPLY TO THE AFFECTED AREA WITH EVERY-TIP THREE TIMES DAILY    [DISCONTINUED] atorvastatin (LIPITOR) 40 MG tablet TAKE 1 TABLET BY MOUTH  DAILY    [DISCONTINUED] hyoscyamine (ANASPAZ,LEVSIN) 0.125 mg Tab TAKE 1 TABLET(125 MCG) BY MOUTH EVERY 8 HOURS AS NEEDED FOR URGENCY    [DISCONTINUED] levocetirizine (XYZAL) 5 MG tablet Take 1 tablet (5 mg total) by mouth every evening.    [DISCONTINUED] levothyroxine (SYNTHROID) 75 MCG tablet Take 1 tablet (75 mcg total) by mouth before breakfast.    [DISCONTINUED] pantoprazole (PROTONIX) 20 MG  tablet Take 1 tablet (20 mg total) by mouth once daily.     Family History       Problem Relation (Age of Onset)    Alcohol abuse Brother, Brother, Brother    Arthritis Daughter, Brother, Brother    Asthma Daughter, Daughter    Bladder Cancer Mother    Breast cancer Sister (79)    Cataracts Mother    Colon cancer Father, Brother (70)    Depression Daughter, Daughter, Daughter    Glaucoma Mother    Heart disease Father (50), Mother    Hyperlipidemia Mother    Hypertension Daughter    Kidney disease Mother    Parkinsonism Brother    Stroke Daughter (40), Daughter          Tobacco Use    Smoking status: Never    Smokeless tobacco: Never   Substance and Sexual Activity    Alcohol use: Not Currently     Comment: socially, rarely    Drug use: Never    Sexual activity: Not Currently     Review of Systems   Constitutional:  Positive for fever (Reports long-term history of low-grade temperatures of 99). Negative for appetite change, chills and unexpected weight change.   HENT:  Negative for sinus pain and sore throat.    Respiratory:  Positive for cough. Negative for shortness of breath and wheezing.    Cardiovascular:  Negative for chest pain, palpitations and leg swelling.   Gastrointestinal:  Negative for anal bleeding.   Genitourinary:  Negative for difficulty urinating and dysuria.   Musculoskeletal:  Negative for back pain.   Skin:  Negative for rash.   Neurological:  Negative for dizziness and headaches.   Hematological:  Does not bruise/bleed easily.   Psychiatric/Behavioral:  Negative for confusion.    All other systems reviewed and are negative.  Objective:     Vital Signs (Most Recent):  Temp: 98 °F (36.7 °C) (11/27/22 1823)  Pulse: 76 (11/28/22 0053)  Resp: 18 (11/28/22 0053)  BP: 139/61 (11/27/22 1823)  SpO2: 96 % (11/28/22 0053) Vital Signs (24h Range):  Temp:  [98 °F (36.7 °C)] 98 °F (36.7 °C)  Pulse:  [76] 76  Resp:  [18-20] 18  SpO2:  [96 %] 96 %  BP: (139)/(61) 139/61     Weight: 56.7 kg (125 lb)  Body  mass index is 22.86 kg/m².    Physical Exam  Vitals and nursing note reviewed.   Constitutional:       General: She is not in acute distress.     Appearance: She is not toxic-appearing.   HENT:      Head: Normocephalic and atraumatic.      Mouth/Throat:      Mouth: Mucous membranes are moist.      Pharynx: No oropharyngeal exudate.   Eyes:      General: No scleral icterus.     Extraocular Movements: Extraocular movements intact.      Pupils: Pupils are equal, round, and reactive to light.   Cardiovascular:      Rate and Rhythm: Normal rate and regular rhythm.      Pulses: Normal pulses.      Heart sounds: No murmur heard.  Pulmonary:      Effort: Pulmonary effort is normal. No respiratory distress.      Breath sounds: Normal breath sounds. No wheezing or rales.   Chest:      Chest wall: No tenderness.   Abdominal:      Comments: Abdomen with mild distension, tympanic to percussion, firm, no rebound or guarding noted she has some midline epigastric and periumbilical tenderness to palpation, no flank tenderness   Musculoskeletal:      Right lower leg: No edema.      Left lower leg: No edema.   Skin:     General: Skin is warm and dry.      Coloration: Skin is pale.   Neurological:      General: No focal deficit present.      Mental Status: She is alert.   Psychiatric:         Mood and Affect: Mood normal.         Behavior: Behavior normal.         CRANIAL NERVES     CN III, IV, VI   Pupils are equal, round, and reactive to light.     Significant Labs: All pertinent labs within the past 24 hours have been reviewed.  CBC:   Recent Labs   Lab 11/27/22 1941   WBC 4.95   HGB 11.9*   HCT 36.3*   *     CMP:   Recent Labs   Lab 11/27/22 1941   *   K 4.1      CO2 23   *   BUN 15   CREATININE 1.0   CALCIUM 10.2   PROT 7.3   ALBUMIN 3.8   BILITOT 0.6   ALKPHOS 62   AST 30   ALT 28   ANIONGAP 11     Cardiac Markers: No results for input(s): CKMB, MYOGLOBIN, BNP, TROPISTAT in the last 48  hours.  Coagulation: No results for input(s): PT, INR, APTT in the last 48 hours.  Lactic Acid: No results for input(s): LACTATE in the last 48 hours.  Lipase:   Recent Labs   Lab 11/27/22 1941   LIPASE 163*     Magnesium: No results for input(s): MG in the last 48 hours.  Troponin: No results for input(s): TROPONINI, TROPONINIHS in the last 48 hours.  Urine Studies: No results for input(s): COLORU, APPEARANCEUA, PHUR, SPECGRAV, PROTEINUA, GLUCUA, KETONESU, BILIRUBINUA, OCCULTUA, NITRITE, UROBILINOGEN, LEUKOCYTESUR, RBCUA, WBCUA, BACTERIA, SQUAMEPITHEL, HYALINECASTS in the last 48 hours.    Invalid input(s): WRIGHTSUR    Significant Imaging: I have reviewed all pertinent imaging results/findings within the past 24 hours.    CT ABDOMEN PELVIS WITHOUT CONTRAST     CLINICAL HISTORY:  concern for bowel obstruction;     TECHNIQUE:  Low dose axial images, sagittal and coronal reformations were obtained from the lung bases to the pubic symphysis.  Oral contrast was not administered.     COMPARISON:  CT abdomen and pelvis, 06/07/2022     FINDINGS:  The dilated loop of small bowel near anastomotic ARIAS staple line.  There is no new wall thickening or increased gaseous distension.  No impaction is evident.  Waste material is noted to the rectum.     The liver, spleen, pancreas and kidneys appear stable.  Pilar caliectasis of both kidneys without focal point of obstruction is again noted.  The urinary bladder appears unremarkable.  No retroperitoneal mass or lymph node enlargement is evident.  Atherosclerotic calcifications in normal caliber aorta remain.     Base of the heart pericardium appear stable.  Lung bases appear clear with no infiltrate nodule or effusion.     Degenerative changes throughout the spine with anterolisthesis of L5 on S1 are noted.  No lytic or blastic changes have become apparent.     Impression:     Persistent mild distension of bowel loops interspersed between ARIAS staple lines of previous small  intestine surgery.     No new distension or wall thickening to suggest acute obstruction or inflammation.     Persistent Pilar caliectasis of the kidneys without focal point of obstruction identified.        Electronically signed by: Horacio Cornelius  Date:                                            11/27/2022  Time:                                           21:13    Assessment/Plan:     * Periumbilical abdominal pain  -etiology suspect most likely related to chronic abdominal symptoms related to her known abdominal adhesions.  CT scan non-contrast shows no bowel obstruction, she has active bowel sounds don't suspect an ileus at this time,   - She has worsened pain with her chronic post-prandial diarrhea symptoms, likely agents to slow motility may only exacerbate her risk of developing recurrent bowel obstruction, using miralax PRN.   -ED referred pt for admission due to pancreatitis, CT does not comment on stranding/inflammation at pancreas, her appetite has been increased, her nausea and abdominal pain with mild elevation of lipase could represent pancreatitis symptoms.   Check Triglyceride level given DM2 hx and check Lactic acid.   -Ultimately treatment at this time is likely focused on bowel rest and symptoms management for her abdominal pain and nausea, give morphine 6mg IV x 1 now for breakthrough pain and will have PRN IV opiates and ant-emetics available.   -Keep NPO and then likely advance pt slowly onto liquids, prior GI notes indicate that if pt has symptoms of SBO as outpt she is advised to switch to liquids immediately.   -She has tried medications for chronic pain, if not improving consider GI vs Surgery consultation if any other management options to be tried      Entrapment of intestine in abdominal adhesions  Chronic issue related to prior abdominal surgeries and extensive scar tissue  -per recent surgery notes and during last attempted ex-lap in March 2022 - patient with entrapment of abdominal  contents and abdominal tissue with increased bleeding during attempted surgery.  Patient is not  A surgical candidate for abdominal procedure unless she were to have life threatening condition and even then is very high bleeding risk per her primary surgeon.         Fecal incontinence  Chronic issues stable  -source of recurrent UTI for patient.       Acquired hypothyroidism  Resume home levothyroxine      Type 2 diabetes mellitus without complication, without long-term current use of insulin  Patient's FSGs are controlled on current medication regimen.  Last A1c reviewed-   Lab Results   Component Value Date    HGBA1C 7.0 (H) 09/27/2022     Most recent fingerstick glucose reviewed- No results for input(s): POCTGLUCOSE in the last 24 hours.  Current correctional scale  Low  Maintain anti-hyperglycemic dose as follows-   Antihyperglycemics (From admission, onward)    Start     Stop Route Frequency Ordered    11/28/22 0148  insulin aspart U-100 pen 0-5 Units         -- SubQ Before meals & nightly PRN 11/28/22 0051        Hold Oral hypoglycemics while patient is in the hospital.    Myelodysplastic syndrome  Mild - followed by hematology on no chronic therapies  -has mild thrombocytopenia, stable mild anemia      VTE Risk Mitigation (From admission, onward)         Ordered     enoxaparin injection 40 mg  Daily         11/28/22 0051     IP VTE HIGH RISK PATIENT  Once         11/28/22 0051     Place sequential compression device  Until discontinued         11/28/22 0051                   Raymond Mujica MD  Department of Hospital Medicine   [facility] - Emergency Dept

## 2022-11-28 NOTE — ED NOTES
Pt ambulated to bathroom with steady gait, minimal assistance needed, pt updated on plan of care, will continue to monitor

## 2022-11-28 NOTE — ASSESSMENT & PLAN NOTE
-etiology suspect most likely related to chronic abdominal symptoms related to her known abdominal adhesions.  CT scan non-contrast shows no bowel obstruction, she has active bowel sounds don't suspect an ileus at this time,   - She has worsened pain with her chronic post-prandial diarrhea symptoms, likely agents to slow motility may only exacerbate her risk of developing recurrent bowel obstruction, using miralax PRN.   -ED referred pt for admission due to pancreatitis, CT does not comment on stranding/inflammation at pancreas, her appetite has been increased, her nausea and abdominal pain with mild elevation of lipase could represent pancreatitis symptoms.   Check Triglyceride level given DM2 hx and check Lactic acid.   -Ultimately treatment at this time is likely focused on bowel rest and symptoms management for her abdominal pain and nausea, give morphine 6mg IV x 1 now for breakthrough pain and will have PRN IV opiates and ant-emetics available.   -Keep NPO and then likely advance pt slowly onto liquids, prior GI notes indicate that if pt has symptoms of SBO as outpt she is advised to switch to liquids immediately.   -She has tried medications for chronic pain, if not improving consider GI vs Surgery consultation if any other management options to be tried

## 2022-11-28 NOTE — ASSESSMENT & PLAN NOTE
Patient's FSGs are controlled on current medication regimen.  Last A1c reviewed-   Lab Results   Component Value Date    HGBA1C 7.0 (H) 09/27/2022     Most recent fingerstick glucose reviewed- No results for input(s): POCTGLUCOSE in the last 24 hours.  Current correctional scale  Low  Maintain anti-hyperglycemic dose as follows-   Antihyperglycemics (From admission, onward)    Start     Stop Route Frequency Ordered    11/28/22 0148  insulin aspart U-100 pen 0-5 Units         -- SubQ Before meals & nightly PRN 11/28/22 0051        Hold Oral hypoglycemics while patient is in the hospital.

## 2022-11-28 NOTE — SUBJECTIVE & OBJECTIVE
Past Medical History:   Diagnosis Date    Abdominal pain     Allergic rhinitis     Arthritis     Blood platelet disorder     Blood platelet disorder     Blood transfusion     during delivery and     Bowel obstruction     Cervical radiculopathy     followed by dr cloud    Colon cancer     transverse colon; resected; Stage IIA (pT3 pN0 MX)    Degenerative joint disease (DJD) of lumbar spine     Diabetes mellitus     Diarrhea     Falls 2020    Family history of breast cancer     Family history of colon cancer     Fatty liver     GERD (gastroesophageal reflux disease)     History of shingles     Hyperlipidemia     Hypomagnesemia     Hypothyroidism     Irritable bowel syndrome     Microscopic colitis     treated     Migraine     Myelodysplastic syndrome     Raynaud phenomenon     Raynaud's disease     Squamous cell carcinoma of skin     Type 2 diabetes mellitus        Past Surgical History:   Procedure Laterality Date    ADENOIDECTOMY      APPENDECTOMY      BACK SURGERY      CARPAL TUNNEL RELEASE      bilateral      SECTION      CHOLECYSTECTOMY  1965    COLECTOMY  2011    Transverse colon resection by Dr. Aguirre    COLONOSCOPY N/A 2017    Procedure: COLONOSCOPY;  Surgeon: Manjit Alvarez MD;  Location: [facility] (4TH FLR);  Service: Endoscopy;  Laterality: N/A;    COLONOSCOPY N/A 2019    Procedure: COLONOSCOPY;  Surgeon: Ramiro Jefferson MD;  Location: [facility] (4TH FLR);  Service: Endoscopy;  Laterality: N/A;  PM prep    COLONOSCOPY N/A 2022    Procedure: COLONOSCOPY;  Surgeon: Ramiro Jefferson MD;  Location: [facility] (2ND FLR);  Service: Endoscopy;  Laterality: N/A;  EGD and colonoscopy in 6-8 weeks from now     states has constipation. -extended Golytely prep    CYSTOSCOPY N/A 2021    Procedure: CYSTOSCOPY;  Surgeon: Viridiana Valenzuela MD;  [address];  Service: Urology;  Laterality: N/A;    ENDOSCOPIC ULTRASOUND OF UPPER GASTROINTESTINAL TRACT  N/A 12/12/2018    Procedure: ULTRASOUND, UPPER GI TRACT, ENDOSCOPIC;  Surgeon: Jose Hess MD;  Location: [facility];  Service: Endoscopy;  Laterality: N/A;    ENDOSCOPIC ULTRASOUND OF UPPER GASTROINTESTINAL TRACT N/A 01/23/2019    Procedure: ULTRASOUND, UPPER GI TRACT, ENDOSCOPIC;  Surgeon: Jose Hess MD;  Location: [facility] (2ND FLR);  Service: Endoscopy;  Laterality: N/A;    ESOPHAGOGASTRODUODENOSCOPY N/A 11/16/2018    Procedure: EGD (ESOPHAGOGASTRODUODENOSCOPY);  Surgeon: Angelo Reynolds MD;  Location: [facility] (2ND FLR);  Service: Endoscopy;  Laterality: N/A;    ESOPHAGOGASTRODUODENOSCOPY N/A 06/26/2019    Procedure: EGD (ESOPHAGOGASTRODUODENOSCOPY);  Surgeon: Ramiro Jefferson MD;  Location: [facility] (4TH FLR);  Service: Endoscopy;  Laterality: N/A;    ESOPHAGOGASTRODUODENOSCOPY N/A 5/4/2022    Procedure: EGD (ESOPHAGOGASTRODUODENOSCOPY);  Surgeon: Ramiro Jefferson MD;  Location: [facility] (2ND FLR);  Service: Endoscopy;  Laterality: N/A;  fully vaccinated-GT    EYE SURGERY      Cataract Removal    FLUOROSCOPIC URODYNAMIC STUDY N/A 11/09/2021    Procedure: URODYNAMIC STUDY, FLUOROSCOPIC;  Surgeon: Viridiana Valenzuela MD;  Location: [facility] OR 1ST FLR;  Service: Urology;  Laterality: N/A;  90 minutes     HYSTERECTOMY      JOINT REPLACEMENT      posterolateral fusion with autograft bone and [place] mineralized bone matrix  02/01/2013    [facility]    SMALL INTESTINE SURGERY      SPINE SURGERY      TOE SURGERY      TONSILLECTOMY      TRIGGER FINGER RELEASE         Review of patient's allergies indicates:   Allergen Reactions    Codeine Itching and Nausea And Vomiting    Dilaudid [hydromorphone (bulk)] Other (See Comments)     Oversedating, head burning. Pt prefers to avoid.       Percocet [oxycodone-acetaminophen] Itching    Sulfa (sulfonamide antibiotics) Itching and Nausea And Vomiting           Latex, natural rubber Rash       No current facility-administered medications  on file prior to encounter.     Current Outpatient Medications on File Prior to Encounter   Medication Sig    acetaminophen (TYLENOL) 650 MG TbSR Take 1,300 mg by mouth 2 (two) times a day.    ascorbic acid, vitamin C, (VITAMIN C) 1000 MG tablet Take 1,000 mg by mouth once daily.    biotin 10,000 mcg TbDL Take 1 tablet by mouth once daily.     calcium-vitamin D3 (OS-KASSANDRA 500 + D3) 500 mg-5 mcg (200 unit) per tablet Take 1 tablet by mouth once daily.    conjugated estrogens (PREMARIN) vaginal cream INSERT 1/2 GRAM VAGINALLY  TWICE WEEKLY    cranberry extract 200 mg Cap Take 1 capsule by mouth once daily.    donepeziL (ARICEPT) 10 MG tablet Take 1 tablet (10 mg total) by mouth every evening.    DULoxetine (CYMBALTA) 30 MG capsule TAKE 1 CAPSULE BY MOUTH  TWICE DAILY (Patient taking differently: Take 30 mg by mouth 2 (two) times daily.)    ferrous sulfate 324 mg (65 mg iron) TbEC Take 1 tablet (324 mg total) by mouth once daily.    magnesium glycinate 100 mg Tab Take 500 mg by mouth once daily at 6am. (Patient taking differently: Take 1 tablet by mouth once daily at 6am.)    metFORMIN (GLUCOPHAGE) 500 MG tablet TAKE 1 TABLET(500 MG) BY MOUTH DAILY WITH BREAKFAST    montelukast (SINGULAIR) 10 mg tablet TAKE 1 TABLET BY MOUTH  DAILY    ondansetron (ZOFRAN-ODT) 8 MG TbDL Take 1 tablet (8 mg total) by mouth every 8 (eight) hours as needed (nausea).    polyethylene glycol (GLYCOLAX) 17 gram/dose powder Take 17 g by mouth daily as needed (Constipation).    promethazine-codeine 6.25-10 mg/5 ml (PHENERGAN WITH CODEINE) 6.25-10 mg/5 mL syrup Take 5 mLs by mouth every 6 (six) hours as needed for Cough.    vitamin D 1000 units Tab Take 1,000 Units by mouth once daily. D3    flash glucose scanning reader (FREESTYLE EFREN 14 DAY READER) Misc Check blood glucose level 3 times daily.    flash glucose sensor (FREESTYLE EFREN 14 DAY SENSOR) Kit 1 application by Misc.(Non-Drug; Combo Route) route every 14 (fourteen) days. Disp 30 or 90  day refill    hydrocortisone 2.5 % cream Apply topically 2 (two) times daily as needed.    LIDOcaine (LIDODERM) 5 % Place 1 patch onto the skin once daily. Remove & Discard patch within 12 hours or as directed by MD    triamcinolone acetonide 0.1% (KENALOG) 0.1 % paste APPLY TO THE AFFECTED AREA WITH EVERY-TIP THREE TIMES DAILY    [DISCONTINUED] atorvastatin (LIPITOR) 40 MG tablet TAKE 1 TABLET BY MOUTH  DAILY    [DISCONTINUED] hyoscyamine (ANASPAZ,LEVSIN) 0.125 mg Tab TAKE 1 TABLET(125 MCG) BY MOUTH EVERY 8 HOURS AS NEEDED FOR URGENCY    [DISCONTINUED] levocetirizine (XYZAL) 5 MG tablet Take 1 tablet (5 mg total) by mouth every evening.    [DISCONTINUED] levothyroxine (SYNTHROID) 75 MCG tablet Take 1 tablet (75 mcg total) by mouth before breakfast.    [DISCONTINUED] pantoprazole (PROTONIX) 20 MG tablet Take 1 tablet (20 mg total) by mouth once daily.     Family History       Problem Relation (Age of Onset)    Alcohol abuse Brother, Brother, Brother    Arthritis Daughter, Brother, Brother    Asthma Daughter, Daughter    Bladder Cancer Mother    Breast cancer Sister (79)    Cataracts Mother    Colon cancer Father, Brother (70)    Depression Daughter, Daughter, Daughter    Glaucoma Mother    Heart disease Father (50), Mother    Hyperlipidemia Mother    Hypertension Daughter    Kidney disease Mother    Parkinsonism Brother    Stroke Daughter (40), Daughter          Tobacco Use    Smoking status: Never    Smokeless tobacco: Never   Substance and Sexual Activity    Alcohol use: Not Currently     Comment: socially, rarely    Drug use: Never    Sexual activity: Not Currently     Review of Systems   Constitutional:  Positive for fever (Reports long-term history of low-grade temperatures of 99). Negative for appetite change, chills and unexpected weight change.   HENT:  Negative for sinus pain and sore throat.    Respiratory:  Positive for cough. Negative for shortness of breath and wheezing.    Cardiovascular:  Negative for  chest pain, palpitations and leg swelling.   Gastrointestinal:  Negative for anal bleeding.   Genitourinary:  Negative for difficulty urinating and dysuria.   Musculoskeletal:  Negative for back pain.   Skin:  Negative for rash.   Neurological:  Negative for dizziness and headaches.   Hematological:  Does not bruise/bleed easily.   Psychiatric/Behavioral:  Negative for confusion.    All other systems reviewed and are negative.  Objective:     Vital Signs (Most Recent):  Temp: 98 °F (36.7 °C) (11/27/22 1823)  Pulse: 76 (11/28/22 0053)  Resp: 18 (11/28/22 0053)  BP: 139/61 (11/27/22 1823)  SpO2: 96 % (11/28/22 0053) Vital Signs (24h Range):  Temp:  [98 °F (36.7 °C)] 98 °F (36.7 °C)  Pulse:  [76] 76  Resp:  [18-20] 18  SpO2:  [96 %] 96 %  BP: (139)/(61) 139/61     Weight: 56.7 kg (125 lb)  Body mass index is 22.86 kg/m².    Physical Exam  Vitals and nursing note reviewed.   Constitutional:       General: She is not in acute distress.     Appearance: She is not toxic-appearing.   HENT:      Head: Normocephalic and atraumatic.      Mouth/Throat:      Mouth: Mucous membranes are moist.      Pharynx: No oropharyngeal exudate.   Eyes:      General: No scleral icterus.     Extraocular Movements: Extraocular movements intact.      Pupils: Pupils are equal, round, and reactive to light.   Cardiovascular:      Rate and Rhythm: Normal rate and regular rhythm.      Pulses: Normal pulses.      Heart sounds: No murmur heard.  Pulmonary:      Effort: Pulmonary effort is normal. No respiratory distress.      Breath sounds: Normal breath sounds. No wheezing or rales.   Chest:      Chest wall: No tenderness.   Abdominal:      Comments: Abdomen with mild distension, tympanic to percussion, firm, no rebound or guarding noted she has some midline epigastric and periumbilical tenderness to palpation, no flank tenderness   Musculoskeletal:      Right lower leg: No edema.      Left lower leg: No edema.   Skin:     General: Skin is warm and  dry.      Coloration: Skin is pale.   Neurological:      General: No focal deficit present.      Mental Status: She is alert.   Psychiatric:         Mood and Affect: Mood normal.         Behavior: Behavior normal.         CRANIAL NERVES     CN III, IV, VI   Pupils are equal, round, and reactive to light.     Significant Labs: All pertinent labs within the past 24 hours have been reviewed.  CBC:   Recent Labs   Lab 11/27/22 1941   WBC 4.95   HGB 11.9*   HCT 36.3*   *     CMP:   Recent Labs   Lab 11/27/22 1941   *   K 4.1      CO2 23   *   BUN 15   CREATININE 1.0   CALCIUM 10.2   PROT 7.3   ALBUMIN 3.8   BILITOT 0.6   ALKPHOS 62   AST 30   ALT 28   ANIONGAP 11     Cardiac Markers: No results for input(s): CKMB, MYOGLOBIN, BNP, TROPISTAT in the last 48 hours.  Coagulation: No results for input(s): PT, INR, APTT in the last 48 hours.  Lactic Acid: No results for input(s): LACTATE in the last 48 hours.  Lipase:   Recent Labs   Lab 11/27/22 1941   LIPASE 163*     Magnesium: No results for input(s): MG in the last 48 hours.  Troponin: No results for input(s): TROPONINI, TROPONINIHS in the last 48 hours.  Urine Studies: No results for input(s): COLORU, APPEARANCEUA, PHUR, SPECGRAV, PROTEINUA, GLUCUA, KETONESU, BILIRUBINUA, OCCULTUA, NITRITE, UROBILINOGEN, LEUKOCYTESUR, RBCUA, WBCUA, BACTERIA, SQUAMEPITHEL, HYALINECASTS in the last 48 hours.    Invalid input(s): WRIGHTSUR    Significant Imaging: I have reviewed all pertinent imaging results/findings within the past 24 hours.    CT ABDOMEN PELVIS WITHOUT CONTRAST     CLINICAL HISTORY:  concern for bowel obstruction;     TECHNIQUE:  Low dose axial images, sagittal and coronal reformations were obtained from the lung bases to the pubic symphysis.  Oral contrast was not administered.     COMPARISON:  CT abdomen and pelvis, 06/07/2022     FINDINGS:  The dilated loop of small bowel near anastomotic ARIAS staple line.  There is no new wall thickening or  increased gaseous distension.  No impaction is evident.  Waste material is noted to the rectum.     The liver, spleen, pancreas and kidneys appear stable.  Pilar caliectasis of both kidneys without focal point of obstruction is again noted.  The urinary bladder appears unremarkable.  No retroperitoneal mass or lymph node enlargement is evident.  Atherosclerotic calcifications in normal caliber aorta remain.     Base of the heart pericardium appear stable.  Lung bases appear clear with no infiltrate nodule or effusion.     Degenerative changes throughout the spine with anterolisthesis of L5 on S1 are noted.  No lytic or blastic changes have become apparent.     Impression:     Persistent mild distension of bowel loops interspersed between ARIAS staple lines of previous small intestine surgery.     No new distension or wall thickening to suggest acute obstruction or inflammation.     Persistent Pilar caliectasis of the kidneys without focal point of obstruction identified.        Electronically signed by: Horacio Cornelius  Date:                                            11/27/2022  Time:                                           21:13

## 2022-11-28 NOTE — HPI
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  She reports that over the past week she has had worsening and persistent periumbilical abdominal pain.  Associated with the pain is abdominal distension and bloating sensation, nausea without vomiting.  She reports having chronic diarrhea generally occurs postprandially and has chronic dark appearing stool she describes as consistency of coffee-grounds.  Abdominal pain is described as spasms often occurring after meals episodes last minutes at a time at home she was not taking oral medications is on no chronic oral opiate therapy, states she has a higher pain tolerance and due to previous bouts of abdominal pain and prior bowel obstructions tries to moderate when she may seek medical care for worsening pain, pain rated as 10/10 very severe earlier today, status post 1 dose of morphine she rates her pain at approximately 8/10, usually states that IV morphine dose will eliminate pain.  She does not have any appetite changes she has new sitters who prepare food for her and if anything she has been eating more food lately.  She also has chronic bowel incontinence, managed with pads or wearing adult diaper. She has no medication changes. For her bowel habits she reports frequently having to have a bowel movement after any oral intake however she states that despite having loose postprandial bowel movements she often feels she has not emptied her bowels after or and fecal urgency symptoms persist.   She has followed with Dr. Barriga in surgery clinic as well as Dr. Jefferson in GI clinic, is on Cymbalta for chronic pain has previously been on gabapentin for extended period of time but had to discontinue due to side effects or  intolerance. She reports being evaluated as an outpatient for possible procedures for treatment of incontinence but states she was not a candidate for them  ER referral for admission was for concern of pancreatitis she does have a elevated lipase of 163 a CT abdomen pelvis noncontrast completed without any pancreatic inflammation noted, also no signs of bowel obstruction but dilated loops of intestine were present

## 2022-11-28 NOTE — ED NOTES
Pt ambulated to bathroom with steady gait, no assistance needed, updated on plan of care, will continue to monitor

## 2022-11-28 NOTE — ACP (ADVANCE CARE PLANNING)
Advance Care Planning     Date: 11/28/2022    Code Status  I  engaged the the patient and daughter-HENRRY  in a conversation about the patient's preferences for care  at the very end of life. The patient wishes to have a natural, peaceful death.  Along those lines, the patient does not wish to have CPR or other invasive treatments performed when her heart and/or breathing stops. I communicated to the patient that a DNR order would be placed in her medical record to reflect this preference.  I spent a total of 15 minutes engaging the patient in this advance care planning discussion.           Of note patient states she should have a DNR on file,  all prior code status were , listed as full

## 2022-11-28 NOTE — ED NOTES
Bed: [facility]  Expected date:   Expected time:   Means of arrival:   Comments:  alla

## 2022-11-28 NOTE — PHARMACY MED REC
"Admission Medication History     The home medication history was taken by Jessica Kauffman.    You may go to "Admission" then "Reconcile Home Medications" tabs to review and/or act upon these items.     The home medication list has been updated by the Pharmacy department.   Please read ALL comments highlighted in yellow.   Please address this information as you see fit.    Feel free to contact us if you have any questions or require assistance.          Potential issues to be addressed PRIOR TO DISCHARGE  Patient and caregiver requires education regarding drug therapies     Jessica Kauffman  EXT [phone]                  .        "

## 2022-11-28 NOTE — ED TRIAGE NOTES
Patient arrived to the ED by personal vehicle with daughter for concerns of abd pain and dark stools for a month.

## 2022-11-29 ENCOUNTER — PATIENT MESSAGE (OUTPATIENT)
Dept: INTERNAL MEDICINE | Facility: CLINIC | Age: 77
End: 2022-11-29
Payer: MEDICARE

## 2022-11-29 ENCOUNTER — PATIENT MESSAGE (OUTPATIENT)
Dept: DERMATOLOGY | Facility: CLINIC | Age: 77
End: 2022-11-29
Payer: MEDICARE

## 2022-11-29 LAB
ALBUMIN SERPL BCP-MCNC: 3.2 G/DL (ref 3.5–5.2)
ALP SERPL-CCNC: 62 U/L (ref 55–135)
ALT SERPL W/O P-5'-P-CCNC: 22 U/L (ref 10–44)
ANION GAP SERPL CALC-SCNC: 10 MMOL/L (ref 8–16)
AST SERPL-CCNC: 26 U/L (ref 10–40)
BILIRUB SERPL-MCNC: 1.7 MG/DL (ref 0.1–1)
BUN SERPL-MCNC: 8 MG/DL (ref 8–23)
CALCIUM SERPL-MCNC: 9.1 MG/DL (ref 8.7–10.5)
CHLORIDE SERPL-SCNC: 101 MMOL/L (ref 95–110)
CO2 SERPL-SCNC: 25 MMOL/L (ref 23–29)
CREAT SERPL-MCNC: 0.8 MG/DL (ref 0.5–1.4)
EST. GFR  (NO RACE VARIABLE): >60 ML/MIN/1.73 M^2
GLUCOSE SERPL-MCNC: 145 MG/DL (ref 70–110)
MAGNESIUM SERPL-MCNC: 1.5 MG/DL (ref 1.6–2.6)
PHOSPHATE SERPL-MCNC: 3.9 MG/DL (ref 2.7–4.5)
POCT GLUCOSE: 110 MG/DL (ref 70–110)
POCT GLUCOSE: 113 MG/DL (ref 70–110)
POCT GLUCOSE: 144 MG/DL (ref 70–110)
POCT GLUCOSE: 156 MG/DL (ref 70–110)
POTASSIUM SERPL-SCNC: 4 MMOL/L (ref 3.5–5.1)
PROT SERPL-MCNC: 6.1 G/DL (ref 6–8.4)
SODIUM SERPL-SCNC: 136 MMOL/L (ref 136–145)
TRIGL SERPL-MCNC: 128 MG/DL (ref 30–150)

## 2022-11-29 PROCEDURE — 63600175 PHARM REV CODE 636 W HCPCS

## 2022-11-29 PROCEDURE — 83735 ASSAY OF MAGNESIUM: CPT | Performed by: HOSPITALIST

## 2022-11-29 PROCEDURE — 99226 PR SUBSEQUENT OBSERVATION CARE,LEVEL III: ICD-10-PCS | Mod: ,,,

## 2022-11-29 PROCEDURE — 84100 ASSAY OF PHOSPHORUS: CPT | Performed by: HOSPITALIST

## 2022-11-29 PROCEDURE — 80053 COMPREHEN METABOLIC PANEL: CPT | Performed by: HOSPITALIST

## 2022-11-29 PROCEDURE — 99226 PR SUBSEQUENT OBSERVATION CARE,LEVEL III: CPT | Mod: ,,,

## 2022-11-29 PROCEDURE — 96372 THER/PROPH/DIAG INJ SC/IM: CPT | Performed by: HOSPITALIST

## 2022-11-29 PROCEDURE — 25000003 PHARM REV CODE 250

## 2022-11-29 PROCEDURE — 84478 ASSAY OF TRIGLYCERIDES: CPT

## 2022-11-29 PROCEDURE — G0378 HOSPITAL OBSERVATION PER HR: HCPCS

## 2022-11-29 PROCEDURE — 36415 COLL VENOUS BLD VENIPUNCTURE: CPT | Performed by: HOSPITALIST

## 2022-11-29 PROCEDURE — 63600175 PHARM REV CODE 636 W HCPCS: Performed by: HOSPITALIST

## 2022-11-29 PROCEDURE — 25000003 PHARM REV CODE 250: Performed by: HOSPITALIST

## 2022-11-29 RX ORDER — MAGNESIUM SULFATE HEPTAHYDRATE 40 MG/ML
2 INJECTION, SOLUTION INTRAVENOUS ONCE
Status: COMPLETED | OUTPATIENT
Start: 2022-11-29 | End: 2022-11-29

## 2022-11-29 RX ORDER — HYDROCODONE BITARTRATE AND ACETAMINOPHEN 5; 325 MG/1; MG/1
1 TABLET ORAL EVERY 8 HOURS
Status: DISCONTINUED | OUTPATIENT
Start: 2022-11-29 | End: 2022-12-02 | Stop reason: HOSPADM

## 2022-11-29 RX ORDER — MORPHINE SULFATE 15 MG/1
15 TABLET, FILM COATED, EXTENDED RELEASE ORAL EVERY 12 HOURS
Status: DISCONTINUED | OUTPATIENT
Start: 2022-11-29 | End: 2022-12-01

## 2022-11-29 RX ORDER — SODIUM CHLORIDE, SODIUM LACTATE, POTASSIUM CHLORIDE, CALCIUM CHLORIDE 600; 310; 30; 20 MG/100ML; MG/100ML; MG/100ML; MG/100ML
INJECTION, SOLUTION INTRAVENOUS CONTINUOUS
Status: DISCONTINUED | OUTPATIENT
Start: 2022-11-29 | End: 2022-11-30

## 2022-11-29 RX ORDER — ACETAMINOPHEN 500 MG
500 TABLET ORAL EVERY 6 HOURS
Status: DISCONTINUED | OUTPATIENT
Start: 2022-11-29 | End: 2022-12-01

## 2022-11-29 RX ORDER — HYDROCODONE BITARTRATE AND ACETAMINOPHEN 5; 325 MG/1; MG/1
1 TABLET ORAL EVERY 4 HOURS PRN
Status: CANCELLED | OUTPATIENT
Start: 2022-11-29

## 2022-11-29 RX ORDER — LIDOCAINE 50 MG/G
1 PATCH TOPICAL DAILY PRN
Status: DISCONTINUED | OUTPATIENT
Start: 2022-11-29 | End: 2022-12-02 | Stop reason: HOSPADM

## 2022-11-29 RX ORDER — MORPHINE SULFATE 4 MG/ML
4 INJECTION, SOLUTION INTRAMUSCULAR; INTRAVENOUS EVERY 4 HOURS PRN
Status: CANCELLED | OUTPATIENT
Start: 2022-11-29

## 2022-11-29 RX ORDER — HYDROCODONE BITARTRATE AND ACETAMINOPHEN 10; 325 MG/1; MG/1
1 TABLET ORAL EVERY 6 HOURS PRN
Status: CANCELLED | OUTPATIENT
Start: 2022-11-29

## 2022-11-29 RX ADMIN — MORPHINE SULFATE 4 MG: 4 INJECTION INTRAVENOUS at 01:11

## 2022-11-29 RX ADMIN — MORPHINE SULFATE 4 MG: 4 INJECTION INTRAVENOUS at 08:11

## 2022-11-29 RX ADMIN — DULOXETINE 30 MG: 30 CAPSULE, DELAYED RELEASE ORAL at 09:11

## 2022-11-29 RX ADMIN — LIDOCAINE 1 PATCH: 50 PATCH CUTANEOUS at 02:11

## 2022-11-29 RX ADMIN — ACETAMINOPHEN 500 MG: 500 TABLET ORAL at 05:11

## 2022-11-29 RX ADMIN — MONTELUKAST 10 MG: 10 TABLET, FILM COATED ORAL at 08:11

## 2022-11-29 RX ADMIN — ENOXAPARIN SODIUM 40 MG: 40 INJECTION SUBCUTANEOUS at 05:11

## 2022-11-29 RX ADMIN — HYDROCODONE BITARTRATE AND ACETAMINOPHEN 1 TABLET: 5; 325 TABLET ORAL at 09:11

## 2022-11-29 RX ADMIN — Medication 1000 MG: at 08:11

## 2022-11-29 RX ADMIN — ACETAMINOPHEN 1300 MG: 500 TABLET ORAL at 05:11

## 2022-11-29 RX ADMIN — DULOXETINE 30 MG: 30 CAPSULE, DELAYED RELEASE ORAL at 08:11

## 2022-11-29 RX ADMIN — SODIUM CHLORIDE, SODIUM LACTATE, POTASSIUM CHLORIDE, AND CALCIUM CHLORIDE: .6; .31; .03; .02 INJECTION, SOLUTION INTRAVENOUS at 05:11

## 2022-11-29 RX ADMIN — ONDANSETRON 4 MG: 2 INJECTION INTRAMUSCULAR; INTRAVENOUS at 10:11

## 2022-11-29 RX ADMIN — DONEPEZIL HYDROCHLORIDE 10 MG: 5 TABLET, FILM COATED ORAL at 09:11

## 2022-11-29 RX ADMIN — MORPHINE SULFATE 15 MG: 15 TABLET, EXTENDED RELEASE ORAL at 09:11

## 2022-11-29 RX ADMIN — MAGNESIUM SULFATE 2 G: 2 INJECTION INTRAVENOUS at 10:11

## 2022-11-29 NOTE — ASSESSMENT & PLAN NOTE
Chronic issue related to prior abdominal surgeries and extensive scar tissue/adhesions  - per recent surgery notes and during last attempted ex-lap in 3/2022: patient with entrapment of abdominal contents and abdominal tissue with increased bleeding during attempted surgery. Patient is not a surgical candidate for abdominal procedure unless she were to have life threatening condition and even then is very high bleeding risk.

## 2022-11-29 NOTE — SUBJECTIVE & OBJECTIVE
Interval History: NAEON, hypotensive but improved s/p IVF. Otherwise, AFVSS. Mag 1.2, replaced IV. Corrected Ca 9.3. Evaluated patient at bedside. Endorsed continued intermittent abdominal pain/cramping in epigastrium and now RLQ. Experiences some relief with prn Morphine. Reports last bowel movement was yesterday, describing it as coffee-ground appearance. States this has been an intermittent occurrence since her last surgery in 3/2022. Denies any BRBPR. Continuing NPO diet for now. Will advance as abdominal pain improves.     Review of Systems   Constitutional:  Positive for fever (Reports long-term history of low-grade temperatures of 99). Negative for appetite change, chills and unexpected weight change.   HENT:  Negative for sinus pain and sore throat.    Respiratory:  Positive for cough. Negative for shortness of breath and wheezing.    Cardiovascular:  Negative for chest pain, palpitations and leg swelling.   Gastrointestinal:  Positive for diarrhea and nausea (improved). Negative for anal bleeding.   Genitourinary:  Negative for difficulty urinating and dysuria.   Musculoskeletal:  Negative for back pain.   Skin:  Negative for rash.   Neurological:  Negative for dizziness and headaches.   Hematological:  Does not bruise/bleed easily.   Psychiatric/Behavioral:  Negative for confusion.    All other systems reviewed and are negative.    Objective:     Vital Signs (Most Recent):  Temp: 98.1 °F (36.7 °C) (11/28/22 1546)  Pulse: 88 (11/28/22 1546)  Resp: 14 (11/28/22 1616)  BP: 111/71 (11/28/22 1546)  SpO2: (!) 92 % (11/28/22 1546)   Vital Signs (24h Range):  Temp:  [97.4 °F (36.3 °C)-98.1 °F (36.7 °C)] 98.1 °F (36.7 °C)  Pulse:  [62-88] 88  Resp:  [14-18] 14  SpO2:  [92 %-98 %] 92 %  BP: ()/(47-71) 111/71     Weight: 63.9 kg (140 lb 14 oz)  Body mass index is 25.77 kg/m².  No intake or output data in the 24 hours ending 11/28/22 1827     Physical Exam  Vitals and nursing note reviewed.   Constitutional:        General: She is not in acute distress.     Appearance: She is well-developed. She is not toxic-appearing.   HENT:      Head: Normocephalic and atraumatic.      Mouth/Throat:      Mouth: Mucous membranes are moist.      Pharynx: No oropharyngeal exudate.   Eyes:      General: No scleral icterus.     Extraocular Movements: Extraocular movements intact.      Pupils: Pupils are equal, round, and reactive to light.   Cardiovascular:      Rate and Rhythm: Normal rate and regular rhythm.      Pulses: Normal pulses.      Heart sounds: No murmur heard.  Pulmonary:      Effort: Pulmonary effort is normal. No respiratory distress.      Breath sounds: Normal breath sounds. No wheezing or rales.   Chest:      Chest wall: No tenderness.   Abdominal:      General: A surgical scar is present. Bowel sounds are decreased. There is distension (mild).      Palpations: Abdomen is soft.      Tenderness: There is abdominal tenderness in the right lower quadrant and epigastric area. There is no guarding or rebound.   Musculoskeletal:         General: No tenderness.      Right lower leg: No edema.      Left lower leg: No edema.   Skin:     General: Skin is warm and dry.   Neurological:      General: No focal deficit present.      Mental Status: She is alert and oriented to person, place, and time.   Psychiatric:         Mood and Affect: Mood normal.         Behavior: Behavior normal.       Significant Labs: All pertinent labs within the past 24 hours have been reviewed.    Significant Imaging: I have reviewed all pertinent imaging results/findings within the past 24 hours.

## 2022-11-29 NOTE — ASSESSMENT & PLAN NOTE
Patient's FSGs are controlled on current medication regimen.  Last A1c reviewed-   Lab Results   Component Value Date    HGBA1C 7.0 (H) 09/27/2022     Most recent fingerstick glucose reviewed-   Recent Labs   Lab 11/28/22 2058 11/29/22 0819 11/29/22  1208   POCTGLUCOSE 144* 156* 144*     Current correctional scale  Low  Maintain anti-hyperglycemic dose as follows-   Antihyperglycemics (From admission, onward)    Start     Stop Route Frequency Ordered    11/28/22 0148  insulin aspart U-100 pen 0-5 Units         -- SubQ Before meals & nightly PRN 11/28/22 0051        Hold Oral hypoglycemics while patient is in the hospital.

## 2022-11-29 NOTE — PLAN OF CARE
[facility]  Discharge Assessment    Primary Care Provider: Darci Guthrie MD     Discharge Assessment (most recent)       BRIEF DISCHARGE ASSESSMENT - 11/29/22 1237          Discharge Planning    Assessment Type Discharge Planning Brief Assessment     Resource/Environmental Concerns none     Support Systems Children;Home care staff     Equipment Currently Used at Home walker, rolling;wheelchair;cane, straight;bath bench;grab bar     Current Living Arrangements home/apartment/condo     Patient/Family Anticipates Transition to home with help/services     Patient/Family Anticipated Services at Transition none     DME Needed Upon Discharge  none     Discharge Plan A Home     Discharge Plan B Home        Physical Activity    On average, how many days per week do you engage in moderate to strenuous exercise (like a brisk walk)? 0 days     On average, how many minutes do you engage in exercise at this level? 0 min        Financial Resource Strain    How hard is it for you to pay for the very basics like food, housing, medical care, and heating? Not hard at all        Housing Stability    In the last 12 months, was there a time when you were not able to pay the mortgage or rent on time? No     In the last 12 months, how many places have you lived? 1     In the last 12 months, was there a time when you did not have a steady place to sleep or slept in a shelter (including now)? No        Transportation Needs    In the past 12 months, has lack of transportation kept you from medical appointments or from getting medications? No     In the past 12 months, has lack of transportation kept you from meetings, work, or from getting things needed for daily living? No        Food Insecurity    Within the past 12 months, you worried that your food would run out before you got the money to buy more. Never true     Within the past 12 months, the food you bought just didn't last and you didn't have money to get more.  Never true        Stress    Do you feel stress - tense, restless, nervous, or anxious, or unable to sleep at night because your mind is troubled all the time - these days? Not at all        Social Connections    In a typical week, how many times do you talk on the phone with family, friends, or neighbors? More than three times a week     How often do you get together with friends or relatives? More than three times a week     Are you , , , , never , or living with a partner?         Alcohol Use    Q1: How often do you have a drink containing alcohol? Never     Q2: How many drinks containing alcohol do you have on a typical day when you are drinking? Patient does not drink     Q3: How often do you have six or more drinks on one occasion? Never                   Pt is a 77 y.o. female admitted with Periumbilical Abdominal pain. She has a PMH of Colon Ca s/p transverse colectomy and DM2. She lives alone in a 7th floor apartment , elevator in the building. She has sitters 8 hours a day, seven days a week to assist with her self care and her cooking/cleaning. [facility] Discharge Packet given to patient and/or family with understanding verbalized.   name and number and estimated discharge date written on white board in patient's room with request to call for any questions or concerns.  Will continue to follow for needs.  Timoteo Brandon RN,BSN

## 2022-11-29 NOTE — HOSPITAL COURSE
Anayeli Judd was admitted to Hospital Medicine for abdominal pain. Patient with continued intermittent abdominal cramping. Experiences some relief with prn Morphine. Continuing NPO diet for now. Will advance as abdominal pain improves. Mag 1.5, replaced IV. S/p IVF, will continue as needed. Transitioned to oral pain medication. Consulted general surgery. No surgical intervention indicated. Plan for PO contrast for therapeutic benefit with Methylnaltrexone, Miralax, and Senna with good results.  DC home with prn Norco, but encouraged an aggressive bowel regimen.

## 2022-11-29 NOTE — ASSESSMENT & PLAN NOTE
History of colon cancer  - Etiology suspect most likely related to chronic abdominal symptoms related to her known abdominal adhesions.  - CT with no bowel obstruction  - Active bowel sounds, do not suspect an ileus at this time  - She has worsened pain with her chronic post-prandial diarrhea symptoms,  - Likely agents to slow motility may only exacerbate her risk of developing recurrent bowel obstruction,   - Uses miralax PRN  - ED referred patient for admission with concerns for pancreatitis  - CT does not comment on stranding/inflammation at pancreas  - Abdominal pain with mild elevation of lipase could represent pancreatitis symptoms  - Triglyceride pending, Lactic acid wnl  - Appetite/nausea improved  - Ultimately treatment at this time is likely focused on bowel rest and symptoms management for her abdominal pain and nausea  - S/p morphine 6mg IV x  1  - Morphine IV q4h prn and anti-emetics  - NPO for now, then advance slowly to liquids  - She has tried medications for chronic pain, if not improving consider GI vs Surgery consultation if any other management options

## 2022-11-29 NOTE — PROGRESS NOTES
[facility] [address] [place] [address] Medicine  Progress Note    Patient Name: Anayeli Judd  MRN: [id number]  Patient Class: OP- Observation   Admission Date: 11/27/2022  Length of Stay: 0 days  Attending Physician: Sami Dougherty MD  Primary Care Provider: Darci Guthrie MD        Subjective:     Principal Problem:Periumbilical abdominal pain        HPI:  77-year-old woman with a history of colon cancer status post transverse colectomy, severe abdominal wall adhesions 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  She reports that over the past week she has had worsening and persistent periumbilical abdominal pain.  Associated with the pain is abdominal distension and bloating sensation, nausea without vomiting.  She reports having chronic diarrhea generally occurs postprandially and has chronic dark appearing stool she describes as consistency of coffee-grounds.  Abdominal pain is described as spasms often occurring after meals episodes last minutes at a time at home she was not taking oral medications is on no chronic oral opiate therapy, states she has a higher pain tolerance and due to previous bouts of abdominal pain and prior bowel obstructions tries to moderate when she may seek medical care for worsening pain, pain rated as 10/10 very severe earlier today, status post 1 dose of morphine she rates her pain at approximately 8/10, usually states that IV morphine dose will eliminate pain.  She does not have any appetite changes she has new sitters who prepare food for her and if anything she has been eating more food lately.  She also has chronic bowel incontinence, managed with pads or wearing adult diaper. She has no medication changes. For her bowel habits she reports frequently having to have a bowel movement after any oral intake however she states that despite having  "loose postprandial bowel movements she often feels she has not emptied her bowels after or and fecal urgency symptoms persist.   She has followed with Dr. Barriga in surgery clinic as well as Dr. Jefferson in GI clinic, is on Cymbalta for chronic pain has previously been on gabapentin for extended period of time but had to discontinue due to side effects or intolerance. She reports being evaluated as an outpatient for possible procedures for treatment of incontinence but states she was not a candidate for them  ER referral for admission was for concern of pancreatitis she does have a elevated lipase of 163 a CT abdomen pelvis noncontrast completed without any pancreatic inflammation noted, also no signs of bowel obstruction but dilated loops of intestine were present      Overview/Hospital Course:  Anayeli Judd was admitted to Hospital Medicine for abdominal pain. Patient with continued intermittent abdominal cramping. Experiences some relief with prn Morphine. Continuing NPO diet for now. Will advance as abdominal pain improves. Mag 1.5, replaced IV. S/p IVF, will continue as needed. Transitioned to oral pain medication. Consulted to general surgery.      Interval History: NAEON, hypotensive to 92/55. Giving IVF infusion. Mag 1.5, replaced IV. Patient reports continued abdominal pain. Morphine provides temporary relief. Transitioned to PO pain medication and tailored regimen. Patient denies any bowel movements since Sunday evening. She continues to be NPO at this time. Per patient, her PCP told her she has a SBO. However, reviewed imaging and discussed with patient that she does not. Patient also with concerns that some adhesions became "detached". Consulted general surgery to evaluate case as well. She denies any headaches, nausea, vomiting, chest pain, or shortness of breath.    Review of Systems   Constitutional:  Positive for fever (Reports long-term history of low-grade temperatures of 99). Negative for " appetite change, chills and unexpected weight change.   HENT:  Negative for sinus pain and sore throat.    Respiratory:  Positive for cough. Negative for shortness of breath and wheezing.    Cardiovascular:  Negative for chest pain, palpitations and leg swelling.   Gastrointestinal:  Positive for diarrhea and nausea (improved). Negative for anal bleeding.   Genitourinary:  Negative for difficulty urinating and dysuria.   Musculoskeletal:  Negative for back pain.   Skin:  Negative for rash.   Neurological:  Negative for dizziness and headaches.   Hematological:  Does not bruise/bleed easily.   Psychiatric/Behavioral:  Negative for confusion.    All other systems reviewed and are negative.    Objective:     Vital Signs (Most Recent):  Temp: 97.2 °F (36.2 °C) (11/29/22 0735)  Pulse: 69 (11/29/22 1201)  Resp: 18 (11/29/22 1355)  BP: (!) 112/53 (11/29/22 1201)  SpO2: 95 % (11/29/22 1201)   Vital Signs (24h Range):  Temp:  [97.1 °F (36.2 °C)-98.4 °F (36.9 °C)] 97.2 °F (36.2 °C)  Pulse:  [] 69  Resp:  [16-20] 18  SpO2:  [92 %-96 %] 95 %  BP: ()/(53-56) 112/53     Weight: 64.2 kg (141 lb 8.6 oz)  Body mass index is 25.89 kg/m².    Intake/Output Summary (Last 24 hours) at 11/29/2022 1709  Last data filed at 11/29/2022 0601  Gross per 24 hour   Intake 0 ml   Output --   Net 0 ml      Physical Exam  Vitals and nursing note reviewed.   Constitutional:       General: She is not in acute distress.     Appearance: She is well-developed. She is not toxic-appearing.   HENT:      Head: Normocephalic and atraumatic.      Mouth/Throat:      Mouth: Mucous membranes are moist.      Pharynx: No oropharyngeal exudate.   Eyes:      General: No scleral icterus.     Extraocular Movements: Extraocular movements intact.      Pupils: Pupils are equal, round, and reactive to light.   Cardiovascular:      Rate and Rhythm: Normal rate and regular rhythm.      Pulses: Normal pulses.      Heart sounds: No murmur heard.  Pulmonary:       Effort: Pulmonary effort is normal. No respiratory distress.      Breath sounds: Normal breath sounds. No wheezing or rales.   Chest:      Chest wall: No tenderness.   Abdominal:      General: A surgical scar is present. Bowel sounds are decreased. There is distension (mild).      Palpations: Abdomen is soft.      Tenderness: There is abdominal tenderness in the right lower quadrant and epigastric area. There is no guarding or rebound.   Musculoskeletal:         General: No tenderness.      Right lower leg: No edema.      Left lower leg: No edema.   Skin:     General: Skin is warm and dry.   Neurological:      General: No focal deficit present.      Mental Status: She is alert and oriented to person, place, and time.   Psychiatric:         Mood and Affect: Mood normal.         Behavior: Behavior normal.       Significant Labs: All pertinent labs within the past 24 hours have been reviewed.    Significant Imaging: I have reviewed all pertinent imaging results/findings within the past 24 hours.      Assessment/Plan:      * Periumbilical abdominal pain  History of colon cancer  - Etiology suspect most likely related to chronic abdominal symptoms related to her known abdominal adhesions.  - CT with no bowel obstruction  - Active bowel sounds, do not suspect an ileus at this time  - She has worsened pain with her chronic post-prandial diarrhea symptoms,  - Likely agents to slow motility may only exacerbate her risk of developing recurrent bowel obstruction,   - Uses miralax PRN  - ED referred patient for admission with concerns for pancreatitis  - CT does not comment on stranding/inflammation at pancreas  - Abdominal pain with mild elevation of lipase could represent pancreatitis symptoms  - Triglyceride wnl, Lactic acid wnl  - Appetite/nausea improved  - Ultimately treatment at this time is likely focused on bowel rest and symptoms management for her abdominal pain and nausea  - S/p morphine 6mg IV x  1  - Norco 5 q8h,  MS Contin 15 mg BID  - PRN anti-emetics  -  mL/hr x 10hr given NPO and hypotension  - NPO for now, then advance slowly to liquids  - She has tried medications for chronic pain  - General surgery consulted, appreciate recs    Entrapment of intestine in abdominal adhesions  Chronic issue related to prior abdominal surgeries and extensive scar tissue/adhesions  - per recent surgery notes and during last attempted ex-lap in 3/2022: patient with entrapment of abdominal contents and abdominal tissue with increased bleeding during attempted surgery. Patient is not a surgical candidate for abdominal procedure unless she were to have life threatening condition and even then is very high bleeding risk.    Myelodysplastic syndrome  - Mild thrombocytopenia, stable mild anemia  - Followed by hematology  - no chronic therapies    Hypomagnesemia  - Magnesium 1.5  - Replaced 2 g IV  - Will monitor on labs    Fecal incontinence  Chronic, stable  - source of recurrent UTI for patient    Type 2 diabetes mellitus without complication, without long-term current use of insulin  Patient's FSGs are controlled on current medication regimen.  Last A1c reviewed-   Lab Results   Component Value Date    HGBA1C 7.0 (H) 09/27/2022     Most recent fingerstick glucose reviewed-   Recent Labs   Lab 11/28/22 2058 11/29/22  0819 11/29/22  1208   POCTGLUCOSE 144* 156* 144*     Current correctional scale  Low  Maintain anti-hyperglycemic dose as follows-   Antihyperglycemics (From admission, onward)    Start     Stop Route Frequency Ordered    11/28/22 0148  insulin aspart U-100 pen 0-5 Units         -- SubQ Before meals & nightly PRN 11/28/22 0051        Hold Oral hypoglycemics while patient is in the hospital.    Acquired hypothyroidism  - Continue home Levothyroxine      VTE Risk Mitigation (From admission, onward)         Ordered     enoxaparin injection 40 mg  Daily         11/28/22 0051     IP VTE HIGH RISK PATIENT  Once         11/28/22  0051     Place sequential compression device  Until discontinued         11/28/22 0051                Discharge Planning   LOI: 12/1/2022     Code Status: DNR   Is the patient medically ready for discharge?: No    Reason for patient still in hospital (select all that apply): Patient trending condition, Treatment and Consult recommendations  Discharge Plan A: Home                  Cal Jordan PA-C  Department of Hospital Medicine   [facility]

## 2022-11-29 NOTE — ASSESSMENT & PLAN NOTE
History of colon cancer  - Etiology suspect most likely related to chronic abdominal symptoms related to her known abdominal adhesions.  - CT with no bowel obstruction  - Active bowel sounds, do not suspect an ileus at this time  - She has worsened pain with her chronic post-prandial diarrhea symptoms,  - Likely agents to slow motility may only exacerbate her risk of developing recurrent bowel obstruction,   - Uses miralax PRN  - ED referred patient for admission with concerns for pancreatitis  - CT does not comment on stranding/inflammation at pancreas  - Abdominal pain with mild elevation of lipase could represent pancreatitis symptoms  - Triglyceride wnl, Lactic acid wnl  - Appetite/nausea improved  - Ultimately treatment at this time is likely focused on bowel rest and symptoms management for her abdominal pain and nausea  - S/p morphine 6mg IV x  1  - Norco 5 q8h, MS Contin 15 mg BID  - PRN anti-emetics  -  mL/hr x 10hr given NPO and hypotension  - NPO for now, then advance slowly to liquids  - She has tried medications for chronic pain  - General surgery consulted, appreciate recs

## 2022-11-29 NOTE — PROGRESS NOTES
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  She reports that over the past week she has had worsening and persistent periumbilical abdominal pain.  Associated with the pain is abdominal distension and bloating sensation, nausea without vomiting.  She reports having chronic diarrhea generally occurs postprandially and has chronic dark appearing stool she describes as consistency of coffee-grounds.  Abdominal pain is described as spasms often occurring after meals episodes last minutes at a time at home she was not taking oral medications is on no chronic oral opiate therapy, states she has a higher pain tolerance and due to previous bouts of abdominal pain and prior bowel obstructions tries to moderate when she may seek medical care for worsening pain, pain rated as 10/10 very severe earlier today, status post 1 dose of morphine she rates her pain at approximately 8/10, usually states that IV morphine dose will eliminate pain.  She does not have any appetite changes she has new sitters who prepare food for her and if anything she has been eating more food lately.  She also has chronic bowel incontinence, managed with pads or wearing adult diaper. She has no medication changes. For her bowel habits she reports frequently having to have a bowel movement after any oral intake however she states that despite having  loose postprandial bowel movements she often feels she has not emptied her bowels after or and fecal urgency symptoms persist.   She has followed with Dr. Barriga in surgery clinic as well as Dr. Jefferson in GI clinic, is on Cymbalta for chronic pain has previously been on gabapentin for extended period of time but had to discontinue due to side effects or intolerance. She reports being evaluated as an outpatient for possible procedures for treatment of incontinence but states she was not a candidate for them  ER referral for admission was for concern of pancreatitis she does have a elevated lipase of 163 a CT abdomen pelvis noncontrast completed without any pancreatic inflammation noted, also no signs of bowel obstruction but dilated loops of intestine were present      Overview/Hospital Course:  Anayeli Judd was admitted to Hospital Medicine for abdominal pain. Patient with continued intermittent abdominal cramping. Experiences some relief with prn Morphine. Continuing NPO diet for now. Will advance as abdominal pain improves. Mag 1.2, replaced IV. Corrected Ca 9.3. S/p IVF, will continue as needed.      Interval History: NAEON, hypotensive but improved s/p IVF. Otherwise, AFVSS. Mag 1.2, replaced IV. Corrected Ca 9.3. Evaluated patient at bedside. Endorsed continued intermittent abdominal pain/cramping in epigastrium and now RLQ. Experiences some relief with prn Morphine. Reports last bowel movement was yesterday, describing it as coffee-ground appearance. States this has been an intermittent occurrence since her last surgery in 3/2022. Denies any BRBPR. Continuing NPO diet for now. Will advance as abdominal pain improves.     Review of Systems   Constitutional:  Positive for fever (Reports long-term history of low-grade temperatures of 99). Negative for appetite change, chills and unexpected weight change.   HENT:  Negative for sinus pain and sore throat.    Respiratory:  Positive for cough. Negative for  shortness of breath and wheezing.    Cardiovascular:  Negative for chest pain, palpitations and leg swelling.   Gastrointestinal:  Positive for diarrhea and nausea (improved). Negative for anal bleeding.   Genitourinary:  Negative for difficulty urinating and dysuria.   Musculoskeletal:  Negative for back pain.   Skin:  Negative for rash.   Neurological:  Negative for dizziness and headaches.   Hematological:  Does not bruise/bleed easily.   Psychiatric/Behavioral:  Negative for confusion.    All other systems reviewed and are negative.    Objective:     Vital Signs (Most Recent):  Temp: 98.1 °F (36.7 °C) (11/28/22 1546)  Pulse: 88 (11/28/22 1546)  Resp: 14 (11/28/22 1616)  BP: 111/71 (11/28/22 1546)  SpO2: (!) 92 % (11/28/22 1546)   Vital Signs (24h Range):  Temp:  [97.4 °F (36.3 °C)-98.1 °F (36.7 °C)] 98.1 °F (36.7 °C)  Pulse:  [62-88] 88  Resp:  [14-18] 14  SpO2:  [92 %-98 %] 92 %  BP: ()/(47-71) 111/71     Weight: 63.9 kg (140 lb 14 oz)  Body mass index is 25.77 kg/m².  No intake or output data in the 24 hours ending 11/28/22 1827     Physical Exam  Vitals and nursing note reviewed.   Constitutional:       General: She is not in acute distress.     Appearance: She is well-developed. She is not toxic-appearing.   HENT:      Head: Normocephalic and atraumatic.      Mouth/Throat:      Mouth: Mucous membranes are moist.      Pharynx: No oropharyngeal exudate.   Eyes:      General: No scleral icterus.     Extraocular Movements: Extraocular movements intact.      Pupils: Pupils are equal, round, and reactive to light.   Cardiovascular:      Rate and Rhythm: Normal rate and regular rhythm.      Pulses: Normal pulses.      Heart sounds: No murmur heard.  Pulmonary:      Effort: Pulmonary effort is normal. No respiratory distress.      Breath sounds: Normal breath sounds. No wheezing or rales.   Chest:      Chest wall: No tenderness.   Abdominal:      General: A surgical scar is present. Bowel sounds are decreased.  There is distension (mild).      Palpations: Abdomen is soft.      Tenderness: There is abdominal tenderness in the right lower quadrant and epigastric area. There is no guarding or rebound.   Musculoskeletal:         General: No tenderness.      Right lower leg: No edema.      Left lower leg: No edema.   Skin:     General: Skin is warm and dry.   Neurological:      General: No focal deficit present.      Mental Status: She is alert and oriented to person, place, and time.   Psychiatric:         Mood and Affect: Mood normal.         Behavior: Behavior normal.       Significant Labs: All pertinent labs within the past 24 hours have been reviewed.    Significant Imaging: I have reviewed all pertinent imaging results/findings within the past 24 hours.      Assessment/Plan:      * Periumbilical abdominal pain  History of colon cancer  - Etiology suspect most likely related to chronic abdominal symptoms related to her known abdominal adhesions.  - CT with no bowel obstruction  - Active bowel sounds, do not suspect an ileus at this time  - She has worsened pain with her chronic post-prandial diarrhea symptoms,  - Likely agents to slow motility may only exacerbate her risk of developing recurrent bowel obstruction,   - Uses miralax PRN  - ED referred patient for admission with concerns for pancreatitis  - CT does not comment on stranding/inflammation at pancreas  - Abdominal pain with mild elevation of lipase could represent pancreatitis symptoms  - Triglyceride pending, Lactic acid wnl  - Appetite/nausea improved  - Ultimately treatment at this time is likely focused on bowel rest and symptoms management for her abdominal pain and nausea  - S/p morphine 6mg IV x  1  - Morphine IV q4h prn and anti-emetics  - NPO for now, then advance slowly to liquids  - She has tried medications for chronic pain, if not improving consider GI vs Surgery consultation if any other management options    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 Patient is not a surgical candidate for abdominal procedure unless she were to have life threatening condition and even then is very high bleeding risk.    Myelodysplastic syndrome  - Mild thrombocytopenia, stable mild anemia  - Followed by hematology  - no chronic therapies    Hypomagnesemia  - Magnesium 1.2  - Replaced 3 g IV  - Will monitor on labs    Fecal incontinence  Chronic, stable  - source of recurrent UTI for patient    Type 2 diabetes mellitus without complication, without long-term current use of insulin  Patient's FSGs are controlled on current medication regimen.  Last A1c reviewed-   Lab Results   Component Value Date    HGBA1C 7.0 (H) 09/27/2022     Most recent fingerstick glucose reviewed- No results for input(s): POCTGLUCOSE in the last 24 hours.  Current correctional scale  Low  Maintain anti-hyperglycemic dose as follows-   Antihyperglycemics (From admission, onward)    Start     Stop Route Frequency Ordered    11/28/22 0148  insulin aspart U-100 pen 0-5 Units         -- SubQ Before meals & nightly PRN 11/28/22 0051        Hold Oral hypoglycemics while patient is in the hospital.    Acquired hypothyroidism  - Continue home Levothyroxine    VTE Risk Mitigation (From admission, onward)         Ordered     enoxaparin injection 40 mg  Daily         11/28/22 0051     IP VTE HIGH RISK PATIENT  Once         11/28/22 0051     Place sequential compression device  Until discontinued         11/28/22 0051                Discharge Planning   LOI:      Code Status: DNR   Is the patient medically ready for discharge?: No    Reason for patient still in hospital (select all that apply): Patient trending condition, Laboratory test  and Treatment                     Cal Jordan PA-C  Department of Hospital Medicine   [facility] 11[facility]

## 2022-11-29 NOTE — SUBJECTIVE & OBJECTIVE
"Interval History: NAEON, hypotensive to 92/55. Giving IVF infusion. Mag 1.5, replaced IV. Patient reports continued abdominal pain. Morphine provides temporary relief. Transitioned to PO pain medication and tailored regimen. Patient denies any bowel movements since Sunday evening. She continues to be NPO at this time. Per patient, her PCP told her she has a SBO. However, reviewed imaging and discussed with patient that she does not. Patient also with concerns that some adhesions became "detached". Consulted general surgery to evaluate case as well. She denies any headaches, nausea, vomiting, chest pain, or shortness of breath.    Review of Systems   Constitutional:  Positive for fever (Reports long-term history of low-grade temperatures of 99). Negative for appetite change, chills and unexpected weight change.   HENT:  Negative for sinus pain and sore throat.    Respiratory:  Positive for cough. Negative for shortness of breath and wheezing.    Cardiovascular:  Negative for chest pain, palpitations and leg swelling.   Gastrointestinal:  Positive for diarrhea and nausea (improved). Negative for anal bleeding.   Genitourinary:  Negative for difficulty urinating and dysuria.   Musculoskeletal:  Negative for back pain.   Skin:  Negative for rash.   Neurological:  Negative for dizziness and headaches.   Hematological:  Does not bruise/bleed easily.   Psychiatric/Behavioral:  Negative for confusion.    All other systems reviewed and are negative.    Objective:     Vital Signs (Most Recent):  Temp: 97.2 °F (36.2 °C) (11/29/22 0735)  Pulse: 69 (11/29/22 1201)  Resp: 18 (11/29/22 1355)  BP: (!) 112/53 (11/29/22 1201)  SpO2: 95 % (11/29/22 1201)   Vital Signs (24h Range):  Temp:  [97.1 °F (36.2 °C)-98.4 °F (36.9 °C)] 97.2 °F (36.2 °C)  Pulse:  [] 69  Resp:  [16-20] 18  SpO2:  [92 %-96 %] 95 %  BP: ()/(53-56) 112/53     Weight: 64.2 kg (141 lb 8.6 oz)  Body mass index is 25.89 kg/m².    Intake/Output Summary (Last " 24 hours) at 11/29/2022 1709  Last data filed at 11/29/2022 0601  Gross per 24 hour   Intake 0 ml   Output --   Net 0 ml      Physical Exam  Vitals and nursing note reviewed.   Constitutional:       General: She is not in acute distress.     Appearance: She is well-developed. She is not toxic-appearing.   HENT:      Head: Normocephalic and atraumatic.      Mouth/Throat:      Mouth: Mucous membranes are moist.      Pharynx: No oropharyngeal exudate.   Eyes:      General: No scleral icterus.     Extraocular Movements: Extraocular movements intact.      Pupils: Pupils are equal, round, and reactive to light.   Cardiovascular:      Rate and Rhythm: Normal rate and regular rhythm.      Pulses: Normal pulses.      Heart sounds: No murmur heard.  Pulmonary:      Effort: Pulmonary effort is normal. No respiratory distress.      Breath sounds: Normal breath sounds. No wheezing or rales.   Chest:      Chest wall: No tenderness.   Abdominal:      General: A surgical scar is present. Bowel sounds are decreased. There is distension (mild).      Palpations: Abdomen is soft.      Tenderness: There is abdominal tenderness in the right lower quadrant and epigastric area. There is no guarding or rebound.   Musculoskeletal:         General: No tenderness.      Right lower leg: No edema.      Left lower leg: No edema.   Skin:     General: Skin is warm and dry.   Neurological:      General: No focal deficit present.      Mental Status: She is alert and oriented to person, place, and time.   Psychiatric:         Mood and Affect: Mood normal.         Behavior: Behavior normal.       Significant Labs: All pertinent labs within the past 24 hours have been reviewed.    Significant Imaging: I have reviewed all pertinent imaging results/findings within the past 24 hours.

## 2022-11-30 ENCOUNTER — PATIENT MESSAGE (OUTPATIENT)
Dept: DERMATOLOGY | Facility: CLINIC | Age: 77
End: 2022-11-30
Payer: MEDICARE

## 2022-11-30 LAB
ALBUMIN SERPL BCP-MCNC: 2.9 G/DL (ref 3.5–5.2)
ALP SERPL-CCNC: 66 U/L (ref 55–135)
ALT SERPL W/O P-5'-P-CCNC: 20 U/L (ref 10–44)
ANION GAP SERPL CALC-SCNC: 11 MMOL/L (ref 8–16)
AST SERPL-CCNC: 26 U/L (ref 10–40)
BASOPHILS # BLD AUTO: 0.01 K/UL (ref 0–0.2)
BASOPHILS NFR BLD: 0.2 % (ref 0–1.9)
BILIRUB SERPL-MCNC: 1.3 MG/DL (ref 0.1–1)
BUN SERPL-MCNC: 8 MG/DL (ref 8–23)
CALCIUM SERPL-MCNC: 8.7 MG/DL (ref 8.7–10.5)
CHLORIDE SERPL-SCNC: 103 MMOL/L (ref 95–110)
CO2 SERPL-SCNC: 23 MMOL/L (ref 23–29)
CREAT SERPL-MCNC: 0.7 MG/DL (ref 0.5–1.4)
DIFFERENTIAL METHOD: ABNORMAL
EOSINOPHIL # BLD AUTO: 0.1 K/UL (ref 0–0.5)
EOSINOPHIL NFR BLD: 3.2 % (ref 0–8)
ERYTHROCYTE [DISTWIDTH] IN BLOOD BY AUTOMATED COUNT: 14.3 % (ref 11.5–14.5)
EST. GFR  (NO RACE VARIABLE): >60 ML/MIN/1.73 M^2
GLUCOSE SERPL-MCNC: 116 MG/DL (ref 70–110)
HCT VFR BLD AUTO: 30 % (ref 37–48.5)
HGB BLD-MCNC: 9.9 G/DL (ref 12–16)
IMM GRANULOCYTES # BLD AUTO: 0.01 K/UL (ref 0–0.04)
IMM GRANULOCYTES NFR BLD AUTO: 0.2 % (ref 0–0.5)
LYMPHOCYTES # BLD AUTO: 1.2 K/UL (ref 1–4.8)
LYMPHOCYTES NFR BLD: 26.3 % (ref 18–48)
MAGNESIUM SERPL-MCNC: 1.5 MG/DL (ref 1.6–2.6)
MCH RBC QN AUTO: 32.4 PG (ref 27–31)
MCHC RBC AUTO-ENTMCNC: 33 G/DL (ref 32–36)
MCV RBC AUTO: 98 FL (ref 82–98)
MONOCYTES # BLD AUTO: 0.7 K/UL (ref 0.3–1)
MONOCYTES NFR BLD: 16.2 % (ref 4–15)
NEUTROPHILS # BLD AUTO: 2.4 K/UL (ref 1.8–7.7)
NEUTROPHILS NFR BLD: 53.9 % (ref 38–73)
NRBC BLD-RTO: 0 /100 WBC
PHOSPHATE SERPL-MCNC: 3.4 MG/DL (ref 2.7–4.5)
PLATELET # BLD AUTO: 76 K/UL (ref 150–450)
PMV BLD AUTO: 10.5 FL (ref 9.2–12.9)
POCT GLUCOSE: 84 MG/DL (ref 70–110)
POCT GLUCOSE: 86 MG/DL (ref 70–110)
POCT GLUCOSE: 97 MG/DL (ref 70–110)
POCT GLUCOSE: 99 MG/DL (ref 70–110)
POTASSIUM SERPL-SCNC: 3.9 MMOL/L (ref 3.5–5.1)
PROT SERPL-MCNC: 5.7 G/DL (ref 6–8.4)
RBC # BLD AUTO: 3.06 M/UL (ref 4–5.4)
SODIUM SERPL-SCNC: 137 MMOL/L (ref 136–145)
WBC # BLD AUTO: 4.38 K/UL (ref 3.9–12.7)

## 2022-11-30 PROCEDURE — 99233 SBSQ HOSP IP/OBS HIGH 50: CPT | Mod: ,,,

## 2022-11-30 PROCEDURE — 25000003 PHARM REV CODE 250

## 2022-11-30 PROCEDURE — 36415 COLL VENOUS BLD VENIPUNCTURE: CPT | Performed by: HOSPITALIST

## 2022-11-30 PROCEDURE — 84100 ASSAY OF PHOSPHORUS: CPT | Performed by: HOSPITALIST

## 2022-11-30 PROCEDURE — 11000001 HC ACUTE MED/SURG PRIVATE ROOM

## 2022-11-30 PROCEDURE — 83735 ASSAY OF MAGNESIUM: CPT | Performed by: HOSPITALIST

## 2022-11-30 PROCEDURE — 80053 COMPREHEN METABOLIC PANEL: CPT | Performed by: HOSPITALIST

## 2022-11-30 PROCEDURE — 99233 PR SUBSEQUENT HOSPITAL CARE,LEVL III: ICD-10-PCS | Mod: ,,,

## 2022-11-30 PROCEDURE — 63600175 PHARM REV CODE 636 W HCPCS: Performed by: HOSPITALIST

## 2022-11-30 PROCEDURE — 63600175 PHARM REV CODE 636 W HCPCS

## 2022-11-30 PROCEDURE — 25000003 PHARM REV CODE 250: Performed by: HOSPITALIST

## 2022-11-30 PROCEDURE — 85025 COMPLETE CBC W/AUTO DIFF WBC: CPT

## 2022-11-30 RX ORDER — POLYETHYLENE GLYCOL 3350 17 G/17G
17 POWDER, FOR SOLUTION ORAL DAILY
Status: DISCONTINUED | OUTPATIENT
Start: 2022-11-30 | End: 2022-11-30

## 2022-11-30 RX ORDER — MAGNESIUM SULFATE HEPTAHYDRATE 40 MG/ML
2 INJECTION, SOLUTION INTRAVENOUS ONCE
Status: COMPLETED | OUTPATIENT
Start: 2022-11-30 | End: 2022-11-30

## 2022-11-30 RX ORDER — PANTOPRAZOLE SODIUM 40 MG/1
40 TABLET, DELAYED RELEASE ORAL DAILY
Status: DISCONTINUED | OUTPATIENT
Start: 2022-12-01 | End: 2022-12-02 | Stop reason: HOSPADM

## 2022-11-30 RX ORDER — DIPHENHYDRAMINE HCL 25 MG
25 CAPSULE ORAL ONCE
Status: COMPLETED | OUTPATIENT
Start: 2022-11-30 | End: 2022-11-30

## 2022-11-30 RX ADMIN — HYDROCODONE BITARTRATE AND ACETAMINOPHEN 1 TABLET: 5; 325 TABLET ORAL at 05:11

## 2022-11-30 RX ADMIN — ACETAMINOPHEN 500 MG: 500 TABLET ORAL at 11:11

## 2022-11-30 RX ADMIN — ONDANSETRON 4 MG: 2 INJECTION INTRAMUSCULAR; INTRAVENOUS at 10:11

## 2022-11-30 RX ADMIN — MONTELUKAST 10 MG: 10 TABLET, FILM COATED ORAL at 09:11

## 2022-11-30 RX ADMIN — MAGNESIUM SULFATE 2 G: 2 INJECTION INTRAVENOUS at 09:11

## 2022-11-30 RX ADMIN — SODIUM CHLORIDE, SODIUM LACTATE, POTASSIUM CHLORIDE, AND CALCIUM CHLORIDE: .6; .31; .03; .02 INJECTION, SOLUTION INTRAVENOUS at 03:11

## 2022-11-30 RX ADMIN — MORPHINE SULFATE 15 MG: 15 TABLET, EXTENDED RELEASE ORAL at 08:11

## 2022-11-30 RX ADMIN — ACETAMINOPHEN 500 MG: 500 TABLET ORAL at 06:11

## 2022-11-30 RX ADMIN — ACETAMINOPHEN 500 MG: 500 TABLET ORAL at 12:11

## 2022-11-30 RX ADMIN — DIPHENHYDRAMINE HYDROCHLORIDE 25 MG: 25 CAPSULE ORAL at 09:11

## 2022-11-30 RX ADMIN — DULOXETINE 30 MG: 30 CAPSULE, DELAYED RELEASE ORAL at 09:11

## 2022-11-30 RX ADMIN — Medication 1000 MG: at 09:11

## 2022-11-30 RX ADMIN — ENOXAPARIN SODIUM 40 MG: 40 INJECTION SUBCUTANEOUS at 06:11

## 2022-11-30 RX ADMIN — DULOXETINE 30 MG: 30 CAPSULE, DELAYED RELEASE ORAL at 08:11

## 2022-11-30 RX ADMIN — POLYETHYLENE GLYCOL 3350 17 G: 17 POWDER, FOR SOLUTION ORAL at 01:11

## 2022-11-30 RX ADMIN — DONEPEZIL HYDROCHLORIDE 10 MG: 5 TABLET, FILM COATED ORAL at 08:11

## 2022-11-30 RX ADMIN — MORPHINE SULFATE 15 MG: 15 TABLET, EXTENDED RELEASE ORAL at 09:11

## 2022-11-30 RX ADMIN — HYDROCODONE BITARTRATE AND ACETAMINOPHEN 1 TABLET: 5; 325 TABLET ORAL at 09:11

## 2022-11-30 NOTE — HPI
Anayeli Judd is a 77yoF with a past medical history significant for colon cancer s/p transverse colectomy, recurrent bowel obstructions requiring exploratory laparotomy x 2 (most recently 3/2022), with a frozen abdomen, DM type II, and hypothyroidism who presented to the hospital on 11/27 with persistent abdominal pain. The patient states that she has had several episodes similar to this in the past, but that this has been her worst episode to date. She states that at its worst, the pain was a 10/10. She states being able to tolerate a diet, denies vomiting, but does endorse abdominal distension and nausea. She states that her pain has improved since her presentation. She is passing flatus. Last bowel movement was 11/25/22. Her lab work was remarkable for mildly elevated lipase on admission, but CT scan does not demonstrate any peripancreatic inflammation or fat stranding. The patient's initial CT scan is largely unremarkable-- there is mild diffuse dilation of the small bowel without any transition point or wall thickening.     The patient states that since her admission, the pain has remained persistent. It is controlled with the narcotic pain regimen. She continues to pass flatus. Of note, her bilirubin bumped to 1.7 without associated elevation of the LFTs. It is downtrending as of today. She has been NPO with ice chips, which she is tolerating. General surgery consulted for evaluation.

## 2022-11-30 NOTE — ASSESSMENT & PLAN NOTE
Anayeli Judd is a 77yoF with a significant past intraabdominal surgical history and recurrent small bowel obstructions who presented to the hospital with persistent periumbilical abdominal pain. General surgery consulted for evaluation.     - patient seen and examined  - labs, imaging reviewed   - diffuse mild dilation of the small bowel on CT from 11/27 without any evidence of wall thickening, transition point   - no peripancreatic fat stranding or evidence of active pancreatitis on imaging despite initially elevated lipase   - transient elevation of bilirubin, now downtrending  - ok for PO trial with clears to assess toleration  - patient would likely benefit from a bowel movement  - plan for PO contrast for therapeutic benefit-- given active flatus and initial CT scan, low concern for obstruction or ileus  - avoid narcotic medications  - no surgical intervention indicated  - rest of care per primary team

## 2022-11-30 NOTE — CONSULTS
[facility]  General Surgery  Consult Note    Patient Name: Anayeli Judd  MRN: [id number]  Code Status: DNR  Admission Date: 11/27/2022  Hospital Length of Stay: 0 days  Attending Physician: Sami Dougherty MD  Primary Care Provider: Darci Guthrie MD    Patient information was obtained from patient and past medical records.     Inpatient consult to General Surgery  Consult performed by: Primo Mendes MD  Consult ordered by: Cal Jordan PA-C        Subjective:     Principal Problem: Periumbilical abdominal pain    History of Present Illness: Anayeli Judd is a 77yoF with a past medical history significant for colon cancer s/p transverse colectomy, recurrent bowel obstructions requiring exploratory laparotomy x 2 (most recently 3/2022), with a frozen abdomen, DM type II, and hypothyroidism who presented to the hospital on 11/27 with persistent abdominal pain. The patient states that she has had several episodes similar to this in the past, but that this has been her worst episode to date. She states that at its worst, the pain was a 10/10. She states being able to tolerate a diet, denies vomiting, but does endorse abdominal distension and nausea. She states that her pain has improved since her presentation. She is passing flatus. Last bowel movement was 11/25/22. Her lab work was remarkable for mildly elevated lipase on admission, but CT scan does not demonstrate any peripancreatic inflammation or fat stranding. The patient's initial CT scan is largely unremarkable-- there is mild diffuse dilation of the small bowel without any transition point or wall thickening.     The patient states that since her admission, the pain has remained persistent. It is controlled with the narcotic pain regimen. She continues to pass flatus. Of note, her bilirubin bumped to 1.7 without associated elevation of the LFTs. It is downtrending as of today. She has been NPO with ice chips, which she is tolerating.  General surgery consulted for evaluation.         No current facility-administered medications on file prior to encounter.     Current Outpatient Medications on File Prior to Encounter   Medication Sig    acetaminophen (TYLENOL) 650 MG TbSR Take 1,300 mg by mouth 2 (two) times a day.    ascorbic acid, vitamin C, (VITAMIN C) 1000 MG tablet Take 1,000 mg by mouth once daily.    biotin 10,000 mcg TbDL Take 1 tablet by mouth once daily.     calcium-vitamin D3 (OS-KASSANDRA 500 + D3) 500 mg-5 mcg (200 unit) per tablet Take 1 tablet by mouth once daily.    conjugated estrogens (PREMARIN) vaginal cream INSERT 1/2 GRAM VAGINALLY  TWICE WEEKLY    cranberry extract 200 mg Cap Take 1 capsule by mouth once daily.    donepeziL (ARICEPT) 10 MG tablet Take 1 tablet (10 mg total) by mouth every evening.    DULoxetine (CYMBALTA) 30 MG capsule TAKE 1 CAPSULE BY MOUTH  TWICE DAILY (Patient taking differently: Take 30 mg by mouth 2 (two) times daily.)    ferrous sulfate 324 mg (65 mg iron) TbEC Take 1 tablet (324 mg total) by mouth once daily.    magnesium glycinate 100 mg Tab Take 500 mg by mouth once daily at 6am. (Patient taking differently: Take 1 tablet by mouth once daily at 6am.)    metFORMIN (GLUCOPHAGE) 500 MG tablet TAKE 1 TABLET(500 MG) BY MOUTH DAILY WITH BREAKFAST    montelukast (SINGULAIR) 10 mg tablet TAKE 1 TABLET BY MOUTH  DAILY    ondansetron (ZOFRAN-ODT) 8 MG TbDL Take 1 tablet (8 mg total) by mouth every 8 (eight) hours as needed (nausea).    polyethylene glycol (GLYCOLAX) 17 gram/dose powder Take 17 g by mouth daily as needed (Constipation).    promethazine-codeine 6.25-10 mg/5 ml (PHENERGAN WITH CODEINE) 6.25-10 mg/5 mL syrup Take 5 mLs by mouth every 6 (six) hours as needed for Cough.    vitamin D 1000 units Tab Take 1,000 Units by mouth once daily. D3    atorvastatin (LIPITOR) 40 MG tablet Take 40 mg by mouth once daily.    flash glucose scanning reader (FREESTYLE EFREN 14 DAY READER) Misc Check  blood glucose level 3 times daily.    flash glucose sensor (FREESTYLE EFREN 14 DAY SENSOR) Kit 1 application by Misc.(Non-Drug; Combo Route) route every 14 (fourteen) days. Disp 30 or 90 day refill    hydrocortisone 2.5 % cream Apply topically 2 (two) times daily as needed.    levothyroxine (SYNTHROID) 75 MCG tablet Take 75 mcg by mouth before breakfast.    LIDOcaine (LIDODERM) 5 % Place 1 patch onto the skin once daily. Remove & Discard patch within 12 hours or as directed by MD    pantoprazole (PROTONIX) 20 MG tablet Take 20 mg by mouth once daily.    triamcinolone acetonide 0.1% (KENALOG) 0.1 % paste APPLY TO THE AFFECTED AREA WITH EVERY-TIP THREE TIMES DAILY    [DISCONTINUED] hyoscyamine (ANASPAZ,LEVSIN) 0.125 mg Tab TAKE 1 TABLET(125 MCG) BY MOUTH EVERY 8 HOURS AS NEEDED FOR URGENCY       Review of patient's allergies indicates:   Allergen Reactions    Codeine Itching and Nausea And Vomiting    Dilaudid [hydromorphone (bulk)] Other (See Comments)     Oversedating, head burning. Pt prefers to avoid.       Percocet [oxycodone-acetaminophen] Itching    Sulfa (sulfonamide antibiotics) Itching and Nausea And Vomiting           Latex, natural rubber Rash       Past Medical History:   Diagnosis Date    Abdominal pain     Allergic rhinitis     Arthritis     Blood platelet disorder     Blood platelet disorder     Blood transfusion     during delivery and     Bowel obstruction     Cervical radiculopathy     followed by dr cloud    Colon cancer     transverse colon; resected; Stage IIA (pT3 pN0 MX)    Degenerative joint disease (DJD) of lumbar spine     Diabetes mellitus     Diarrhea     Falls 2020    Family history of breast cancer     Family history of colon cancer     Fatty liver     GERD (gastroesophageal reflux disease)     History of shingles     Hyperlipidemia     Hypomagnesemia     Hypothyroidism     Irritable bowel syndrome     Microscopic colitis     treated  2013    Migraine     Myelodysplastic syndrome     Raynaud phenomenon     Raynaud's disease     Squamous cell carcinoma of skin     Type 2 diabetes mellitus      Past Surgical History:   Procedure Laterality Date    ADENOIDECTOMY      APPENDECTOMY      BACK SURGERY      CARPAL TUNNEL RELEASE      bilateral      SECTION      CHOLECYSTECTOMY  1965    COLECTOMY  2011    Transverse colon resection by Dr. Aguirre    COLONOSCOPY N/A 2017    Procedure: COLONOSCOPY;  Surgeon: Manjit Alvarez MD;  Location: [facility] (4TH FLR);  Service: Endoscopy;  Laterality: N/A;    COLONOSCOPY N/A 2019    Procedure: COLONOSCOPY;  Surgeon: Ramiro Jefferson MD;  Location: [facility] (4TH FLR);  Service: Endoscopy;  Laterality: N/A;  PM prep    COLONOSCOPY N/A 2022    Procedure: COLONOSCOPY;  Surgeon: Ramiro Jefferson MD;  Location: [facility] (2ND FLR);  Service: Endoscopy;  Laterality: N/A;  EGD and colonoscopy in 6-8 weeks from now     states has constipation. -extended Golytely prep    CYSTOSCOPY N/A 2021    Procedure: CYSTOSCOPY;  Surgeon: Viridiana Valenzuela MD;  Location: [facility] 1ST FLR;  Service: Urology;  Laterality: N/A;    ENDOSCOPIC ULTRASOUND OF UPPER GASTROINTESTINAL TRACT N/A 2018    Procedure: ULTRASOUND, UPPER GI TRACT, ENDOSCOPIC;  Surgeon: Jose Hess MD;  Location: [facility];  Service: Endoscopy;  Laterality: N/A;    ENDOSCOPIC ULTRASOUND OF UPPER GASTROINTESTINAL TRACT N/A 2019    Procedure: ULTRASOUND, UPPER GI TRACT, ENDOSCOPIC;  Surgeon: Jose Hess MD;  Location: [facility] (2ND FLR);  Service: Endoscopy;  Laterality: N/A;    ESOPHAGOGASTRODUODENOSCOPY N/A 2018    Procedure: EGD (ESOPHAGOGASTRODUODENOSCOPY);  Surgeon: Angelo Reynolds MD;  Location: [facility] (2ND FLR);  Service: Endoscopy;  Laterality: N/A;    ESOPHAGOGASTRODUODENOSCOPY N/A 2019    Procedure: EGD (ESOPHAGOGASTRODUODENOSCOPY);  Surgeon: Ramiro Jefferson MD;   Location: [facility] ENDO (4TH FLR);  Service: Endoscopy;  Laterality: N/A;    ESOPHAGOGASTRODUODENOSCOPY N/A 5/4/2022    Procedure: EGD (ESOPHAGOGASTRODUODENOSCOPY);  Surgeon: Ramiro Jefferson MD;  Location: [facility] (2ND FLR);  Service: Endoscopy;  Laterality: N/A;  fully vaccinated-GT    EYE SURGERY      Cataract Removal    FLUOROSCOPIC URODYNAMIC STUDY N/A 11/09/2021    Procedure: URODYNAMIC STUDY, FLUOROSCOPIC;  Surgeon: Viridiana Valenzuela MD;  Location: [facility] OR 1ST FLR;  Service: Urology;  Laterality: N/A;  90 minutes     HYSTERECTOMY      JOINT REPLACEMENT      posterolateral fusion with autograft bone and [place] mineralized bone matrix  02/01/2013    at [facility] for lumbar spine stenosis    SMALL INTESTINE SURGERY      SPINE SURGERY      TOE SURGERY      TONSILLECTOMY      TRIGGER FINGER RELEASE       Family History       Problem Relation (Age of Onset)    Alcohol abuse Brother, Brother, Brother    Arthritis Daughter, Brother, Brother    Asthma Daughter, Daughter    Bladder Cancer Mother    Breast cancer Sister (79)    Cataracts Mother    Colon cancer Father, Brother (70)    Depression Daughter, Daughter, Daughter    Glaucoma Mother    Heart disease Father (50), Mother    Hyperlipidemia Mother    Hypertension Daughter    Kidney disease Mother    Parkinsonism Brother    Stroke Daughter (40), Daughter          Tobacco Use    Smoking status: Never    Smokeless tobacco: Never   Substance and Sexual Activity    Alcohol use: Not Currently     Comment: socially, rarely    Drug use: Never    Sexual activity: Not Currently     Review of Systems   Constitutional:  Positive for activity change. Negative for appetite change, chills, fatigue, fever and unexpected weight change.   HENT: Negative.     Eyes: Negative.    Respiratory:  Negative for cough, choking, chest tightness and shortness of breath.    Cardiovascular:  Negative for chest pain.   Gastrointestinal:  Positive for abdominal pain, constipation and  nausea. Negative for abdominal distention, diarrhea and vomiting.   Endocrine: Negative.    Genitourinary: Negative.    Musculoskeletal:  Positive for back pain.   Skin: Negative.    Objective:     Vital Signs (Most Recent):  Temp: 97.7 °F (36.5 °C) (11/30/22 1205)  Pulse: 65 (11/30/22 1205)  Resp: 17 (11/30/22 1205)  BP: 132/63 (11/30/22 1205)  SpO2: (!) 93 % (11/30/22 1205)   Vital Signs (24h Range):  Temp:  [97 °F (36.1 °C)-98.7 °F (37.1 °C)] 97.7 °F (36.5 °C)  Pulse:  [60-67] 65  Resp:  [16-20] 17  SpO2:  [91 %-97 %] 93 %  BP: ()/(48-63) 132/63     Weight: 64.2 kg (141 lb 8.6 oz)  Body mass index is 25.89 kg/m².    Physical Exam  Vitals and nursing note reviewed.   Constitutional:       General: She is not in acute distress.     Appearance: Normal appearance.   HENT:      Head: Normocephalic and atraumatic.      Nose: Nose normal.      Mouth/Throat:      Mouth: Mucous membranes are moist.   Cardiovascular:      Rate and Rhythm: Normal rate and regular rhythm.      Pulses: Normal pulses.   Pulmonary:      Effort: Pulmonary effort is normal. No respiratory distress.   Abdominal:      Comments: Abdomen soft, mildly distended  Largely non-tender to palpation throughout her abdomen at the time of my exam  Well-healed midline incision   Musculoskeletal:         General: Normal range of motion.   Skin:     General: Skin is warm and dry.   Neurological:      General: No focal deficit present.      Mental Status: She is alert.       Significant Labs:  I have reviewed all pertinent lab results within the past 24 hours.  CBC:   Recent Labs   Lab 11/30/22 0229   WBC 4.38   RBC 3.06*   HGB 9.9*   HCT 30.0*   PLT 76*   MCV 98   MCH 32.4*   MCHC 33.0     CMP:   Recent Labs   Lab 11/30/22 0229   *   CALCIUM 8.7   ALBUMIN 2.9*   PROT 5.7*      K 3.9   CO2 23      BUN 8   CREATININE 0.7   ALKPHOS 66   ALT 20   AST 26   BILITOT 1.3*       Significant Diagnostics:  I have reviewed all pertinent imaging  results/findings within the past 24 hours.      Assessment/Plan:     * Periumbilical abdominal pain  Anayeli Judd is a 77yoF with a significant past intraabdominal surgical history and recurrent small bowel obstructions who presented to the hospital with persistent periumbilical abdominal pain. General surgery consulted for evaluation.     - patient seen and examined  - labs, imaging reviewed   - diffuse mild dilation of the small bowel on CT from 11/27 without any evidence of wall thickening, transition point   - no peripancreatic fat stranding or evidence of active pancreatitis on imaging despite initially elevated lipase   - transient elevation of bilirubin, now downtrending  - ok for PO trial with clears to assess toleration  - patient would likely benefit from a bowel movement  - plan for PO contrast for therapeutic benefit-- given active flatus and initial CT scan, low concern for obstruction or ileus  - avoid narcotic medications  - no surgical intervention indicated  - rest of care per primary team        VTE Risk Mitigation (From admission, onward)         Ordered     enoxaparin injection 40 mg  Daily         11/28/22 0051     IP VTE HIGH RISK PATIENT  Once         11/28/22 0051     Place sequential compression device  Until discontinued         11/28/22 0051                Thank you for your consult. I will follow-up with patient. Please contact us if you have any additional questions.    Primo Mendes MD  General Surgery  [facility]

## 2022-11-30 NOTE — SUBJECTIVE & OBJECTIVE
No current facility-administered medications on file prior to encounter.     Current Outpatient Medications on File Prior to Encounter   Medication Sig    acetaminophen (TYLENOL) 650 MG TbSR Take 1,300 mg by mouth 2 (two) times a day.    ascorbic acid, vitamin C, (VITAMIN C) 1000 MG tablet Take 1,000 mg by mouth once daily.    biotin 10,000 mcg TbDL Take 1 tablet by mouth once daily.     calcium-vitamin D3 (OS-KASSANDRA 500 + D3) 500 mg-5 mcg (200 unit) per tablet Take 1 tablet by mouth once daily.    conjugated estrogens (PREMARIN) vaginal cream INSERT 1/2 GRAM VAGINALLY  TWICE WEEKLY    cranberry extract 200 mg Cap Take 1 capsule by mouth once daily.    donepeziL (ARICEPT) 10 MG tablet Take 1 tablet (10 mg total) by mouth every evening.    DULoxetine (CYMBALTA) 30 MG capsule TAKE 1 CAPSULE BY MOUTH  TWICE DAILY (Patient taking differently: Take 30 mg by mouth 2 (two) times daily.)    ferrous sulfate 324 mg (65 mg iron) TbEC Take 1 tablet (324 mg total) by mouth once daily.    magnesium glycinate 100 mg Tab Take 500 mg by mouth once daily at 6am. (Patient taking differently: Take 1 tablet by mouth once daily at 6am.)    metFORMIN (GLUCOPHAGE) 500 MG tablet TAKE 1 TABLET(500 MG) BY MOUTH DAILY WITH BREAKFAST    montelukast (SINGULAIR) 10 mg tablet TAKE 1 TABLET BY MOUTH  DAILY    ondansetron (ZOFRAN-ODT) 8 MG TbDL Take 1 tablet (8 mg total) by mouth every 8 (eight) hours as needed (nausea).    polyethylene glycol (GLYCOLAX) 17 gram/dose powder Take 17 g by mouth daily as needed (Constipation).    promethazine-codeine 6.25-10 mg/5 ml (PHENERGAN WITH CODEINE) 6.25-10 mg/5 mL syrup Take 5 mLs by mouth every 6 (six) hours as needed for Cough.    vitamin D 1000 units Tab Take 1,000 Units by mouth once daily. D3    atorvastatin (LIPITOR) 40 MG tablet Take 40 mg by mouth once daily.    flash glucose scanning reader ([employer] EFREN 14 DAY READER) Misc Check blood glucose level 3 times daily.    flash glucose sensor (FREESTYLE  EFREN 14 DAY SENSOR) Kit 1 application by Misc.(Non-Drug; Combo Route) route every 14 (fourteen) days. Disp 30 or 90 day refill    hydrocortisone 2.5 % cream Apply topically 2 (two) times daily as needed.    levothyroxine (SYNTHROID) 75 MCG tablet Take 75 mcg by mouth before breakfast.    LIDOcaine (LIDODERM) 5 % Place 1 patch onto the skin once daily. Remove & Discard patch within 12 hours or as directed by MD    pantoprazole (PROTONIX) 20 MG tablet Take 20 mg by mouth once daily.    triamcinolone acetonide 0.1% (KENALOG) 0.1 % paste APPLY TO THE AFFECTED AREA WITH EVERY-TIP THREE TIMES DAILY    [DISCONTINUED] hyoscyamine (ANASPAZ,LEVSIN) 0.125 mg Tab TAKE 1 TABLET(125 MCG) BY MOUTH EVERY 8 HOURS AS NEEDED FOR URGENCY       Review of patient's allergies indicates:   Allergen Reactions    Codeine Itching and Nausea And Vomiting    Dilaudid [hydromorphone (bulk)] Other (See Comments)     Oversedating, head burning. Pt prefers to avoid.       Percocet [oxycodone-acetaminophen] Itching    Sulfa (sulfonamide antibiotics) Itching and Nausea And Vomiting           Latex, natural rubber Rash       Past Medical History:   Diagnosis Date    Abdominal pain     Allergic rhinitis     Arthritis     Blood platelet disorder     Blood platelet disorder     Blood transfusion     during delivery and     Bowel obstruction     Cervical radiculopathy     followed by dr cloud    Colon cancer     transverse colon; resected; Stage IIA (pT3 pN0 MX)    Degenerative joint disease (DJD) of lumbar spine     Diabetes mellitus     Diarrhea     Falls 2020    Family history of breast cancer     Family history of colon cancer     Fatty liver     GERD (gastroesophageal reflux disease)     History of shingles     Hyperlipidemia     Hypomagnesemia     Hypothyroidism     Irritable bowel syndrome     Microscopic colitis     treated     Migraine     Myelodysplastic syndrome     Raynaud phenomenon     Raynaud's disease     Squamous  cell carcinoma of skin     Type 2 diabetes mellitus      Past Surgical History:   Procedure Laterality Date    ADENOIDECTOMY      APPENDECTOMY      BACK SURGERY      CARPAL TUNNEL RELEASE      bilateral      SECTION      CHOLECYSTECTOMY  1965    COLECTOMY  2011    Transverse colon resection by Dr. Aguirre    COLONOSCOPY N/A 2017    Procedure: COLONOSCOPY;  Surgeon: Manjit Alvarez MD;  Location: [facility] (4TH FLR);  Service: Endoscopy;  Laterality: N/A;    COLONOSCOPY N/A 2019    Procedure: COLONOSCOPY;  Surgeon: Ramiro Jefferson MD;  Location: [facility] (4TH FLR);  Service: Endoscopy;  Laterality: N/A;  PM prep    COLONOSCOPY N/A 2022    Procedure: COLONOSCOPY;  Surgeon: Ramiro Jefferson MD;  Location: [facility] (2ND FLR);  Service: Endoscopy;  Laterality: N/A;  EGD and colonoscopy in 6-8 weeks from now     states has constipation. -extended Golytely prep    CYSTOSCOPY N/A 2021    Procedure: CYSTOSCOPY;  Surgeon: Viridiana Valenzuela MD;  Location: [facility] 1ST FLR;  Service: Urology;  Laterality: N/A;    ENDOSCOPIC ULTRASOUND OF UPPER GASTROINTESTINAL TRACT N/A 2018    Procedure: ULTRASOUND, UPPER GI TRACT, ENDOSCOPIC;  Surgeon: Jose Hess MD;  Location: [facility];  Service: Endoscopy;  Laterality: N/A;    ENDOSCOPIC ULTRASOUND OF UPPER GASTROINTESTINAL TRACT N/A 2019    Procedure: ULTRASOUND, UPPER GI TRACT, ENDOSCOPIC;  Surgeon: Jose Hess MD;  Location: [facility] (2ND FLR);  Service: Endoscopy;  Laterality: N/A;    ESOPHAGOGASTRODUODENOSCOPY N/A 2018    Procedure: EGD (ESOPHAGOGASTRODUODENOSCOPY);  Surgeon: Angelo Reynolds MD;  Location: [facility] (2ND FLR);  Service: Endoscopy;  Laterality: N/A;    ESOPHAGOGASTRODUODENOSCOPY N/A 2019    Procedure: EGD (ESOPHAGOGASTRODUODENOSCOPY);  Surgeon: Ramiro Jefferson MD;  Location: [facility] (4TH FLR);  Service: Endoscopy;  Laterality: N/A;    ESOPHAGOGASTRODUODENOSCOPY N/A 2022     Procedure: EGD (ESOPHAGOGASTRODUODENOSCOPY);  Surgeon: Ramiro Jefferson MD;  Location: [facility] ENDO (2ND FLR);  Service: Endoscopy;  Laterality: N/A;  fully vaccinated-GT    EYE SURGERY      Cataract Removal    FLUOROSCOPIC URODYNAMIC STUDY N/A 11/09/2021    Procedure: URODYNAMIC STUDY, FLUOROSCOPIC;  Surgeon: Viridiana Valenzuela MD;  Location: [facility] OR 1ST FLR;  Service: Urology;  Laterality: N/A;  90 minutes     HYSTERECTOMY      JOINT REPLACEMENT      posterolateral fusion with autograft bone and [place] mineralized bone matrix  02/01/2013    at [facility] for lumbar spine stenosis    SMALL INTESTINE SURGERY      SPINE SURGERY      TOE SURGERY      TONSILLECTOMY      TRIGGER FINGER RELEASE       Family History       Problem Relation (Age of Onset)    Alcohol abuse Brother, Brother, Brother    Arthritis Daughter, Brother, Brother    Asthma Daughter, Daughter    Bladder Cancer Mother    Breast cancer Sister (79)    Cataracts Mother    Colon cancer Father, Brother (70)    Depression Daughter, Daughter, Daughter    Glaucoma Mother    Heart disease Father (50), Mother    Hyperlipidemia Mother    Hypertension Daughter    Kidney disease Mother    Parkinsonism Brother    Stroke Daughter (40), Daughter          Tobacco Use    Smoking status: Never    Smokeless tobacco: Never   Substance and Sexual Activity    Alcohol use: Not Currently     Comment: socially, rarely    Drug use: Never    Sexual activity: Not Currently     Review of Systems   Constitutional:  Positive for activity change. Negative for appetite change, chills, fatigue, fever and unexpected weight change.   HENT: Negative.     Eyes: Negative.    Respiratory:  Negative for cough, choking, chest tightness and shortness of breath.    Cardiovascular:  Negative for chest pain.   Gastrointestinal:  Positive for abdominal pain, constipation and nausea. Negative for abdominal distention, diarrhea and vomiting.   Endocrine: Negative.    Genitourinary: Negative.     Musculoskeletal:  Positive for back pain.   Skin: Negative.    Objective:     Vital Signs (Most Recent):  Temp: 97.7 °F (36.5 °C) (11/30/22 1205)  Pulse: 65 (11/30/22 1205)  Resp: 17 (11/30/22 1205)  BP: 132/63 (11/30/22 1205)  SpO2: (!) 93 % (11/30/22 1205)   Vital Signs (24h Range):  Temp:  [97 °F (36.1 °C)-98.7 °F (37.1 °C)] 97.7 °F (36.5 °C)  Pulse:  [60-67] 65  Resp:  [16-20] 17  SpO2:  [91 %-97 %] 93 %  BP: ()/(48-63) 132/63     Weight: 64.2 kg (141 lb 8.6 oz)  Body mass index is 25.89 kg/m².    Physical Exam  Vitals and nursing note reviewed.   Constitutional:       General: She is not in acute distress.     Appearance: Normal appearance.   HENT:      Head: Normocephalic and atraumatic.      Nose: Nose normal.      Mouth/Throat:      Mouth: Mucous membranes are moist.   Cardiovascular:      Rate and Rhythm: Normal rate and regular rhythm.      Pulses: Normal pulses.   Pulmonary:      Effort: Pulmonary effort is normal. No respiratory distress.   Abdominal:      Comments: Abdomen soft, mildly distended  Largely non-tender to palpation throughout her abdomen at the time of my exam  Well-healed midline incision   Musculoskeletal:         General: Normal range of motion.   Skin:     General: Skin is warm and dry.   Neurological:      General: No focal deficit present.      Mental Status: She is alert.       Significant Labs:  I have reviewed all pertinent lab results within the past 24 hours.  CBC:   Recent Labs   Lab 11/30/22 0229   WBC 4.38   RBC 3.06*   HGB 9.9*   HCT 30.0*   PLT 76*   MCV 98   MCH 32.4*   MCHC 33.0     CMP:   Recent Labs   Lab 11/30/22 0229   *   CALCIUM 8.7   ALBUMIN 2.9*   PROT 5.7*      K 3.9   CO2 23      BUN 8   CREATININE 0.7   ALKPHOS 66   ALT 20   AST 26   BILITOT 1.3*       Significant Diagnostics:  I have reviewed all pertinent imaging results/findings within the past 24 hours.

## 2022-11-30 NOTE — PLAN OF CARE
Problem: Adult Inpatient Plan of Care  Goal: Plan of Care Review  Outcome: Ongoing, Progressing  Goal: Absence of Hospital-Acquired Illness or Injury  Outcome: Ongoing, Progressing  Goal: Optimal Comfort and Wellbeing  Outcome: Ongoing, Progressing  Goal: Readiness for Transition of Care  Outcome: Ongoing, Progressing     Problem: Infection  Goal: Absence of Infection Signs and Symptoms  Outcome: Ongoing, Progressing     Problem: Skin Injury Risk Increased  Goal: Skin Health and Integrity  Outcome: Ongoing, Progressing     Problem: Diabetes Comorbidity  Goal: Blood Glucose Level Within Targeted Range  Outcome: Ongoing, Progressing

## 2022-12-01 ENCOUNTER — PATIENT MESSAGE (OUTPATIENT)
Dept: HOME HEALTH SERVICES | Facility: CLINIC | Age: 77
End: 2022-12-01
Payer: MEDICARE

## 2022-12-01 ENCOUNTER — PATIENT MESSAGE (OUTPATIENT)
Dept: INTERNAL MEDICINE | Facility: CLINIC | Age: 77
End: 2022-12-01
Payer: MEDICARE

## 2022-12-01 LAB
BASOPHILS # BLD AUTO: 0.02 K/UL (ref 0–0.2)
BASOPHILS NFR BLD: 0.4 % (ref 0–1.9)
DIFFERENTIAL METHOD: ABNORMAL
EOSINOPHIL # BLD AUTO: 0.2 K/UL (ref 0–0.5)
EOSINOPHIL NFR BLD: 4.7 % (ref 0–8)
ERYTHROCYTE [DISTWIDTH] IN BLOOD BY AUTOMATED COUNT: 13.9 % (ref 11.5–14.5)
HCT VFR BLD AUTO: 30.8 % (ref 37–48.5)
HGB BLD-MCNC: 10.1 G/DL (ref 12–16)
IMM GRANULOCYTES # BLD AUTO: 0.01 K/UL (ref 0–0.04)
IMM GRANULOCYTES NFR BLD AUTO: 0.2 % (ref 0–0.5)
LYMPHOCYTES # BLD AUTO: 1.6 K/UL (ref 1–4.8)
LYMPHOCYTES NFR BLD: 35.3 % (ref 18–48)
MCH RBC QN AUTO: 32.5 PG (ref 27–31)
MCHC RBC AUTO-ENTMCNC: 32.8 G/DL (ref 32–36)
MCV RBC AUTO: 99 FL (ref 82–98)
MONOCYTES # BLD AUTO: 0.6 K/UL (ref 0.3–1)
MONOCYTES NFR BLD: 12.8 % (ref 4–15)
NEUTROPHILS # BLD AUTO: 2.1 K/UL (ref 1.8–7.7)
NEUTROPHILS NFR BLD: 46.6 % (ref 38–73)
NRBC BLD-RTO: 0 /100 WBC
PLATELET # BLD AUTO: 77 K/UL (ref 150–450)
PMV BLD AUTO: 10.5 FL (ref 9.2–12.9)
POCT GLUCOSE: 117 MG/DL (ref 70–110)
POCT GLUCOSE: 133 MG/DL (ref 70–110)
POCT GLUCOSE: 174 MG/DL (ref 70–110)
POCT GLUCOSE: 68 MG/DL (ref 70–110)
RBC # BLD AUTO: 3.11 M/UL (ref 4–5.4)
WBC # BLD AUTO: 4.45 K/UL (ref 3.9–12.7)

## 2022-12-01 PROCEDURE — 99233 PR SUBSEQUENT HOSPITAL CARE,LEVL III: ICD-10-PCS | Mod: ,,, | Performed by: HOSPITALIST

## 2022-12-01 PROCEDURE — 11000001 HC ACUTE MED/SURG PRIVATE ROOM

## 2022-12-01 PROCEDURE — 63600175 PHARM REV CODE 636 W HCPCS: Performed by: HOSPITALIST

## 2022-12-01 PROCEDURE — 25500020 PHARM REV CODE 255: Performed by: HOSPITALIST

## 2022-12-01 PROCEDURE — 85025 COMPLETE CBC W/AUTO DIFF WBC: CPT

## 2022-12-01 PROCEDURE — 25000003 PHARM REV CODE 250

## 2022-12-01 PROCEDURE — 25000003 PHARM REV CODE 250: Performed by: HOSPITALIST

## 2022-12-01 PROCEDURE — 94761 N-INVAS EAR/PLS OXIMETRY MLT: CPT

## 2022-12-01 PROCEDURE — 99233 SBSQ HOSP IP/OBS HIGH 50: CPT | Mod: ,,, | Performed by: HOSPITALIST

## 2022-12-01 PROCEDURE — 36415 COLL VENOUS BLD VENIPUNCTURE: CPT

## 2022-12-01 RX ORDER — POLYETHYLENE GLYCOL 3350 17 G/17G
17 POWDER, FOR SOLUTION ORAL 2 TIMES DAILY
Status: DISCONTINUED | OUTPATIENT
Start: 2022-12-01 | End: 2022-12-02 | Stop reason: HOSPADM

## 2022-12-01 RX ORDER — CETIRIZINE HYDROCHLORIDE 5 MG/1
5 TABLET ORAL DAILY
Status: DISCONTINUED | OUTPATIENT
Start: 2022-12-01 | End: 2022-12-02 | Stop reason: HOSPADM

## 2022-12-01 RX ORDER — SENNOSIDES 8.6 MG/1
8.6 TABLET ORAL DAILY
Status: DISCONTINUED | OUTPATIENT
Start: 2022-12-01 | End: 2022-12-02 | Stop reason: HOSPADM

## 2022-12-01 RX ADMIN — POLYETHYLENE GLYCOL 3350 17 G: 17 POWDER, FOR SOLUTION ORAL at 11:12

## 2022-12-01 RX ADMIN — ENOXAPARIN SODIUM 40 MG: 40 INJECTION SUBCUTANEOUS at 04:12

## 2022-12-01 RX ADMIN — DONEPEZIL HYDROCHLORIDE 10 MG: 5 TABLET, FILM COATED ORAL at 09:12

## 2022-12-01 RX ADMIN — SALINE NASAL SPRAY 1 SPRAY: 1.5 SOLUTION NASAL at 04:12

## 2022-12-01 RX ADMIN — PANTOPRAZOLE SODIUM 40 MG: 40 TABLET, DELAYED RELEASE ORAL at 10:12

## 2022-12-01 RX ADMIN — HYDROCODONE BITARTRATE AND ACETAMINOPHEN 1 TABLET: 5; 325 TABLET ORAL at 01:12

## 2022-12-01 RX ADMIN — DULOXETINE 30 MG: 30 CAPSULE, DELAYED RELEASE ORAL at 09:12

## 2022-12-01 RX ADMIN — SENNOSIDES 8.6 MG: 8.6 TABLET, FILM COATED ORAL at 01:12

## 2022-12-01 RX ADMIN — DULOXETINE 30 MG: 30 CAPSULE, DELAYED RELEASE ORAL at 10:12

## 2022-12-01 RX ADMIN — MONTELUKAST 10 MG: 10 TABLET, FILM COATED ORAL at 10:12

## 2022-12-01 RX ADMIN — HYDROCODONE BITARTRATE AND ACETAMINOPHEN 1 TABLET: 5; 325 TABLET ORAL at 05:12

## 2022-12-01 RX ADMIN — Medication 1000 MG: at 10:12

## 2022-12-01 RX ADMIN — HYDROCODONE BITARTRATE AND ACETAMINOPHEN 1 TABLET: 5; 325 TABLET ORAL at 09:12

## 2022-12-01 RX ADMIN — ACETAMINOPHEN 500 MG: 500 TABLET ORAL at 05:12

## 2022-12-01 RX ADMIN — DIATRIZOATE MEGLUMINE AND DIATRIZOATE SODIUM 100 ML: 660; 100 LIQUID ORAL; RECTAL at 01:12

## 2022-12-01 RX ADMIN — CONJUGATED ESTROGENS 0.5 G: 0.62 CREAM VAGINAL at 10:12

## 2022-12-01 RX ADMIN — METHYLNALTREXONE BROMIDE 8 MG: 8 INJECTION, SOLUTION SUBCUTANEOUS at 04:12

## 2022-12-01 RX ADMIN — CETIRIZINE HYDROCHLORIDE 5 MG: 5 TABLET, FILM COATED ORAL at 11:12

## 2022-12-01 NOTE — SUBJECTIVE & OBJECTIVE
Interval History: NAEON, hypotensive to 83/48. Improved with IVF. Mag 1.5, replaced with 2 g IV. General surgery consulted, no surgical intervention indicated at this time. Plan to give oral contrast as a therapeutic agent. Evaluated patient at bedside. Denies any lightheadedness or weakness given hypotension. Reports pain is more controlled today and would like to try to eat. Advanced diet to clear liquids today as patient reports pain control. Advised patient that diet needs to be advanced slowly. Reports understanding. Denies any bowel movement since 11/27.     Discussed with outpatient dermatologist Dr. Casanova, plan to remove sutures on L forearm today from recent Mohs surgery.    Update: Spoke to patient's daughter and grand-daughter on the phone. They are upset with care as they feel they are getting conflicting reports from primary and consulting teams. Family states patient was told she needs contrast for imaging per surgery team. However, upon further discussion with surgery, contrast is a therapeutic agent and not intended for imaging in this case. Patient's daughter states that she was told patient needs CT with contrast. I reported that the only further imaging that was discussed was a KUB, but that it was not necessary and results would not  as patient has a complicated abdomen. She stated she trusts what she was told from the surgery team and demanded further imaging. She was also upset that there was no report in patient's chart that her audiology appointment (on 12/02) was rescheduled. I reassured that case management was notified that patient may miss appointment and will assist in rescheduling if patient is still admitted on 12/02.     Review of Systems   Constitutional:  Positive for fever (Reports long-term history of low-grade temperatures of 99). Negative for appetite change, chills and unexpected weight change.   HENT:  Negative for sinus pain and sore throat.    Respiratory:   Positive for cough. Negative for shortness of breath and wheezing.    Cardiovascular:  Negative for chest pain, palpitations and leg swelling.   Gastrointestinal:  Positive for diarrhea and nausea (improved). Negative for anal bleeding.   Genitourinary:  Negative for difficulty urinating and dysuria.   Musculoskeletal:  Negative for back pain.   Skin:  Negative for rash.   Neurological:  Negative for dizziness and headaches.   Hematological:  Does not bruise/bleed easily.   Psychiatric/Behavioral:  Negative for confusion.    All other systems reviewed and are negative.    Objective:     Vital Signs (Most Recent):  Temp: 97.9 °F (36.6 °C) (11/30/22 2043)  Pulse: 67 (11/30/22 2043)  Resp: 16 (11/30/22 2156)  BP: 131/62 (11/30/22 2043)  SpO2: (!) 93 % (11/30/22 2043)   Vital Signs (24h Range):  Temp:  [97.7 °F (36.5 °C)-98.7 °F (37.1 °C)] 97.9 °F (36.6 °C)  Pulse:  [58-67] 67  Resp:  [16-18] 16  SpO2:  [92 %-97 %] 93 %  BP: ()/(48-63) 131/62     Weight: 64.2 kg (141 lb 8.6 oz)  Body mass index is 25.89 kg/m².  No intake or output data in the 24 hours ending 11/30/22 2235     Physical Exam  Vitals and nursing note reviewed.   Constitutional:       General: She is not in acute distress.     Appearance: She is well-developed. She is not toxic-appearing.   HENT:      Head: Normocephalic and atraumatic.      Mouth/Throat:      Mouth: Mucous membranes are moist.      Pharynx: No oropharyngeal exudate.   Eyes:      General: No scleral icterus.     Extraocular Movements: Extraocular movements intact.      Pupils: Pupils are equal, round, and reactive to light.   Cardiovascular:      Rate and Rhythm: Normal rate and regular rhythm.      Pulses: Normal pulses.      Heart sounds: No murmur heard.  Pulmonary:      Effort: Pulmonary effort is normal. No respiratory distress.      Breath sounds: Normal breath sounds. No wheezing or rales.   Chest:      Chest wall: No tenderness.   Abdominal:      General: A surgical scar is  present. There is distension (mild).      Palpations: Abdomen is soft.      Tenderness: There is abdominal tenderness (improved). There is no guarding or rebound.   Musculoskeletal:         General: No tenderness.      Right lower leg: No edema.      Left lower leg: No edema.   Skin:     General: Skin is warm and dry.   Neurological:      General: No focal deficit present.      Mental Status: She is alert and oriented to person, place, and time.   Psychiatric:         Mood and Affect: Mood normal.         Behavior: Behavior normal.       Significant Labs: All pertinent labs within the past 24 hours have been reviewed.    Significant Imaging: I have reviewed all pertinent imaging results/findings within the past 24 hours.

## 2022-12-01 NOTE — SUBJECTIVE & OBJECTIVE
Interval History: No acute events overnight.  Sitter at bedside, family called on the phone during encounter.  Endorses some phlegm, likely post nasal drip.  Plan to start Ocean Nasal Spray and Zyrtec.  Discussed with Gen Surg.  Concern for chronic constipation, relayed concerns to patient.  Plan to stop Morphine as it is contributing to constipation.  Plan to continue Norco, stop Tylenol as over 3g limit - reports no pain today.  Miralax changed to BID, Senna daily.  Trial of Methylnaltrexone - reports last BM on Sunday, normally every day multiple times a day.  Gastrograffin today.  Diet advanced.    Review of Systems   Constitutional:  Negative for chills, fatigue and fever.   HENT:  Positive for postnasal drip.    Respiratory:  Negative for cough and shortness of breath.    Cardiovascular:  Negative for chest pain, palpitations and leg swelling.   Gastrointestinal:  Positive for constipation. Negative for abdominal pain, diarrhea, nausea and vomiting.   Genitourinary:  Negative for dysuria and urgency.   Neurological:  Negative for dizziness and headaches.   All other systems reviewed and are negative.  Objective:     Vital Signs (Most Recent):  Temp: 96.2 °F (35.7 °C) (12/01/22 1109)  Pulse: 60 (12/01/22 1109)  Resp: 18 (12/01/22 1308)  BP: (!) 104/58 (12/01/22 1109)  SpO2: 96 % (12/01/22 1109)   Vital Signs (24h Range):  Temp:  [96.2 °F (35.7 °C)-98.1 °F (36.7 °C)] 96.2 °F (35.7 °C)  Pulse:  [58-67] 60  Resp:  [16-18] 18  SpO2:  [90 %-96 %] 96 %  BP: (104-131)/(53-62) 104/58     Weight: 64.2 kg (141 lb 8.6 oz)  Body mass index is 25.89 kg/m².  No intake or output data in the 24 hours ending 12/01/22 1439   Physical Exam  Constitutional:       Appearance: Normal appearance. She is well-developed.   HENT:      Head: Normocephalic and atraumatic.   Cardiovascular:      Rate and Rhythm: Normal rate and regular rhythm.      Heart sounds: No murmur heard.  Pulmonary:      Effort: Pulmonary effort is normal. No  respiratory distress.      Breath sounds: Normal breath sounds. No wheezing or rales.   Abdominal:      General: There is no distension.      Palpations: Abdomen is soft.      Tenderness: There is no abdominal tenderness.   Musculoskeletal:         General: No deformity.   Skin:     General: Skin is warm.   Neurological:      General: No focal deficit present.      Mental Status: She is alert and oriented to person, place, and time.       Significant Labs: All pertinent labs within the past 24 hours have been reviewed.    Significant Imaging: I have reviewed all pertinent imaging results/findings within the past 24 hours.

## 2022-12-01 NOTE — PLAN OF CARE
POC reviewed w/ pt and daughter (Kimberly) @ bedside. At present denied pain/discomfort.  Problem: Adult Inpatient Plan of Care  Goal: Plan of Care Review  Outcome: Ongoing, Progressing  Goal: Patient-Specific Goal (Individualized)  Outcome: Ongoing, Progressing  Goal: Absence of Hospital-Acquired Illness or Injury  Outcome: Ongoing, Progressing  Goal: Optimal Comfort and Wellbeing  Outcome: Ongoing, Progressing  Goal: Readiness for Transition of Care  Outcome: Ongoing, Progressing     Problem: Infection  Goal: Absence of Infection Signs and Symptoms  Outcome: Ongoing, Progressing     Problem: Skin Injury Risk Increased  Goal: Skin Health and Integrity  Outcome: Ongoing, Progressing     Problem: Diabetes Comorbidity  Goal: Blood Glucose Level Within Targeted Range  Outcome: Ongoing, Progressing

## 2022-12-01 NOTE — ASSESSMENT & PLAN NOTE
Patient's FSGs are controlled on current medication regimen.  Last A1c reviewed-   Lab Results   Component Value Date    HGBA1C 7.0 (H) 09/27/2022     Most recent fingerstick glucose reviewed-   Recent Labs   Lab 11/30/22  1553 11/30/22 2128 12/01/22  0827 12/01/22  1038   POCTGLUCOSE 84 99 68* 174*     Current correctional scale  Low  Maintain anti-hyperglycemic dose as follows-   Antihyperglycemics (From admission, onward)    Start     Stop Route Frequency Ordered    11/28/22 0148  insulin aspart U-100 pen 0-5 Units         -- SubQ Before meals & nightly PRN 11/28/22 0051        Hold Oral hypoglycemics while patient is in the hospital.

## 2022-12-01 NOTE — ASSESSMENT & PLAN NOTE
· Reports chronic abdominal pain, likely from adhesions from previous surgery  · Gen Surg consulted: Feel like constipation is a contributing factor, exacerbated by narcotics, no concern for obstruction, needs an aggressive bowel regimen  · Trial of gastrograffin  · KUB without obustrction  · Stop Morphine  · Continue Norco - Stop Tylenol as over 3g limit  · Trial of Methylnaltrexone x 1  · Start Miralax BID  · Start Senna daily

## 2022-12-01 NOTE — ASSESSMENT & PLAN NOTE
· Chronic issue related to prior abdominal surgeries and extensive scar tissue/adhesions  · Per recent surgery notes and during last attempted ex-lap in 3/2022: patient with entrapment of abdominal contents and abdominal tissue with increased bleeding during attempted surgery.   · Patient is not a surgical candidate for abdominal procedure unless she were to have life threatening condition and even then is very high bleeding risk.

## 2022-12-01 NOTE — ASSESSMENT & PLAN NOTE
History of colon cancer  - Etiology suspect most likely related to chronic abdominal symptoms related to her known abdominal adhesions.  - CT with no bowel obstruction  - Active bowel sounds, do not suspect an ileus at this time  - She has worsened pain with her chronic post-prandial diarrhea symptoms,  - Likely agents to slow motility may only exacerbate her risk of developing recurrent bowel obstruction,   - Uses miralax PRN  - ED referred patient for admission with concerns for pancreatitis  - CT does not comment on stranding/inflammation at pancreas  - Abdominal pain with mild elevation of lipase could represent pancreatitis symptoms  - Triglyceride wnl, Lactic acid wnl  - Appetite/nausea improved  - Ultimately treatment at this time is likely focused on bowel rest and symptoms management for her abdominal pain and nausea  - S/p morphine 6mg IV x  1  - Norco 5 q8h, MS Contin 15 mg BID  - PRN anti-emetics  - s/p  mL/hr x 10hr given NPO and hypotension  - Advance to clear liquids today  - Will add strict bowel regimen as diet advances  - General surgery consulted, appreciate recs   - ok for PO trial with clears to assess toleration   - patient would likely benefit from a bowel movement   - plan for PO contrast for therapeutic benefit-- given active flatus and initial CT scan, low concern for obstruction or ileus   - avoid narcotic medications   - no surgical intervention indicated

## 2022-12-01 NOTE — PROGRESS NOTES
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  She reports that over the past week she has had worsening and persistent periumbilical abdominal pain.  Associated with the pain is abdominal distension and bloating sensation, nausea without vomiting.  She reports having chronic diarrhea generally occurs postprandially and has chronic dark appearing stool she describes as consistency of coffee-grounds.  Abdominal pain is described as spasms often occurring after meals episodes last minutes at a time at home she was not taking oral medications is on no chronic oral opiate therapy, states she has a higher pain tolerance and due to previous bouts of abdominal pain and prior bowel obstructions tries to moderate when she may seek medical care for worsening pain, pain rated as 10/10 very severe earlier today, status post 1 dose of morphine she rates her pain at approximately 8/10, usually states that IV morphine dose will eliminate pain.  She does not have any appetite changes she has new sitters who prepare food for her and if anything she has been eating more food lately.  She also has chronic bowel incontinence, managed with pads or wearing adult diaper. She has no medication changes. For her bowel habits she reports frequently having to have a bowel movement after any oral intake however she states that despite having  loose postprandial bowel movements she often feels she has not emptied her bowels after or and fecal urgency symptoms persist.   She has followed with Dr. Barriga in surgery clinic as well as Dr. Jefferson in GI clinic, is on Cymbalta for chronic pain has previously been on gabapentin for extended period of time but had to discontinue due to side effects or intolerance. She reports being evaluated as an outpatient for possible procedures for treatment of incontinence but states she was not a candidate for them  ER referral for admission was for concern of pancreatitis she does have a elevated lipase of 163 a CT abdomen pelvis noncontrast completed without any pancreatic inflammation noted, also no signs of bowel obstruction but dilated loops of intestine were present      Overview/Hospital Course:  Anayeli Judd was admitted to Hospital Medicine for abdominal pain. Patient with continued intermittent abdominal cramping. Experiences some relief with prn Morphine. Continuing NPO diet for now. Will advance as abdominal pain improves. Mag 1.5, replaced IV. S/p IVF, will continue as needed. Transitioned to oral pain medication. Consulted general surgery. No surgical intervention indicated. Plan for PO contrast for therapeutic benefit. Patient advanced diet to clear liquids today. Will need bowel regimen as diet advances.      Interval History: No acute events overnight.  Sitter at bedside, family called on the phone during encounter.  Endorses some phlegm, likely post nasal drip.  Plan to start Ocean Nasal Spray and Zyrtec.  Discussed with Gen Surg.  Concern for chronic constipation, relayed concerns to patient.  Plan to stop Morphine as it is contributing to constipation.  Plan to continue Norco, stop Tylenol as over 3g limit - reports no pain today.  Miralax changed to BID, Senna daily.  Trial of Methylnaltrexone - reports last BM on Sunday, normally every day multiple times a day.  Gastrograffin today.  Diet  advanced.    Review of Systems   Constitutional:  Negative for chills, fatigue and fever.   HENT:  Positive for postnasal drip.    Respiratory:  Negative for cough and shortness of breath.    Cardiovascular:  Negative for chest pain, palpitations and leg swelling.   Gastrointestinal:  Positive for constipation. Negative for abdominal pain, diarrhea, nausea and vomiting.   Genitourinary:  Negative for dysuria and urgency.   Neurological:  Negative for dizziness and headaches.   All other systems reviewed and are negative.  Objective:     Vital Signs (Most Recent):  Temp: 96.2 °F (35.7 °C) (12/01/22 1109)  Pulse: 60 (12/01/22 1109)  Resp: 18 (12/01/22 1308)  BP: (!) 104/58 (12/01/22 1109)  SpO2: 96 % (12/01/22 1109)   Vital Signs (24h Range):  Temp:  [96.2 °F (35.7 °C)-98.1 °F (36.7 °C)] 96.2 °F (35.7 °C)  Pulse:  [58-67] 60  Resp:  [16-18] 18  SpO2:  [90 %-96 %] 96 %  BP: (104-131)/(53-62) 104/58     Weight: 64.2 kg (141 lb 8.6 oz)  Body mass index is 25.89 kg/m².  No intake or output data in the 24 hours ending 12/01/22 1439   Physical Exam  Constitutional:       Appearance: Normal appearance. She is well-developed.   HENT:      Head: Normocephalic and atraumatic.   Cardiovascular:      Rate and Rhythm: Normal rate and regular rhythm.      Heart sounds: No murmur heard.  Pulmonary:      Effort: Pulmonary effort is normal. No respiratory distress.      Breath sounds: Normal breath sounds. No wheezing or rales.   Abdominal:      General: There is no distension.      Palpations: Abdomen is soft.      Tenderness: There is no abdominal tenderness.   Musculoskeletal:         General: No deformity.   Skin:     General: Skin is warm.   Neurological:      General: No focal deficit present.      Mental Status: She is alert and oriented to person, place, and time.       Significant Labs: All pertinent labs within the past 24 hours have been reviewed.    Significant Imaging: I have reviewed all pertinent imaging  results/findings within the past 24 hours.      Assessment/Plan:      * Periumbilical abdominal pain  · Reports chronic abdominal pain, likely from adhesions from previous surgery  · Gen Surg consulted: Feel like constipation is a contributing factor, exacerbated by narcotics, no concern for obstruction, needs an aggressive bowel regimen  · Trial of gastrograffin  · KUB without obustrction  · Stop Morphine  · Continue Norco - Stop Tylenol as over 3g limit  · Trial of Methylnaltrexone x 1  · Start Miralax BID  · Start Senna daily    Entrapment of intestine in abdominal adhesions  · Chronic issue related to prior abdominal surgeries and extensive scar tissue/adhesions  · Per recent surgery notes and during last attempted ex-lap in 3/2022: patient with entrapment of abdominal contents and abdominal tissue with increased bleeding during attempted surgery.   · Patient is not a surgical candidate for abdominal procedure unless she were to have life threatening condition and even then is very high bleeding risk.    Constipation  · See above      Myelodysplastic syndrome  · Mild thrombocytopenia, stable mild anemia  · Followed by hematology  · No chronic therapies    Hypomagnesemia  · Replaced    Fecal incontinence  · Chronic, stable  · Source of recurrent UTI for patient    Type 2 diabetes mellitus without complication, without long-term current use of insulin  Patient's FSGs are controlled on current medication regimen.  Last A1c reviewed-   Lab Results   Component Value Date    HGBA1C 7.0 (H) 09/27/2022     Most recent fingerstick glucose reviewed-   Recent Labs   Lab 11/30/22  1553 11/30/22  2128 12/01/22  0827 12/01/22  1038   POCTGLUCOSE 84 99 68* 174*     Current correctional scale  Low  Maintain anti-hyperglycemic dose as follows-   Antihyperglycemics (From admission, onward)    Start     Stop Route Frequency Ordered    11/28/22 0148  insulin aspart U-100 pen 0-5 Units         -- SubQ Before meals & nightly PRN  11/28/22 0051        Hold Oral hypoglycemics while patient is in the hospital.    Chronic sinusitis of right maxilla  · Trial of Ocean Nasal Spray  · Start Zyrtec      History of colon cancer  · Noted      Acquired hypothyroidism  · Chronic and stable  · Continue home Levothyroxine      VTE Risk Mitigation (From admission, onward)         Ordered     enoxaparin injection 40 mg  Daily         11/28/22 0051     IP VTE HIGH RISK PATIENT  Once         11/28/22 0051     Place sequential compression device  Until discontinued         11/28/22 0051                Discharge Planning   LOI: 12/2/2022     Code Status: DNR   Is the patient medically ready for discharge?: No    Reason for patient still in hospital (select all that apply): Patient trending condition  Discharge Plan A: Home                  Katie Nicole MD  Department of Hospital Medicine   [facility]

## 2022-12-01 NOTE — PROGRESS NOTES
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  She reports that over the past week she has had worsening and persistent periumbilical abdominal pain.  Associated with the pain is abdominal distension and bloating sensation, nausea without vomiting.  She reports having chronic diarrhea generally occurs postprandially and has chronic dark appearing stool she describes as consistency of coffee-grounds.  Abdominal pain is described as spasms often occurring after meals episodes last minutes at a time at home she was not taking oral medications is on no chronic oral opiate therapy, states she has a higher pain tolerance and due to previous bouts of abdominal pain and prior bowel obstructions tries to moderate when she may seek medical care for worsening pain, pain rated as 10/10 very severe earlier today, status post 1 dose of morphine she rates her pain at approximately 8/10, usually states that IV morphine dose will eliminate pain.  She does not have any appetite changes she has new sitters who prepare food for her and if anything she has been eating more food lately.  She also has chronic bowel incontinence, managed with pads or wearing adult diaper. She has no medication changes. For her bowel habits she reports frequently having to have a bowel movement after any oral intake however she states that despite having  loose postprandial bowel movements she often feels she has not emptied her bowels after or and fecal urgency symptoms persist.   She has followed with Dr. Barriga in surgery clinic as well as Dr. Jefferson in GI clinic, is on Cymbalta for chronic pain has previously been on gabapentin for extended period of time but had to discontinue due to side effects or intolerance. She reports being evaluated as an outpatient for possible procedures for treatment of incontinence but states she was not a candidate for them  ER referral for admission was for concern of pancreatitis she does have a elevated lipase of 163 a CT abdomen pelvis noncontrast completed without any pancreatic inflammation noted, also no signs of bowel obstruction but dilated loops of intestine were present      Overview/Hospital Course:  Anayeli Judd was admitted to Hospital Medicine for abdominal pain. Patient with continued intermittent abdominal cramping. Experiences some relief with prn Morphine. Continuing NPO diet for now. Will advance as abdominal pain improves. Mag 1.5, replaced IV. S/p IVF, will continue as needed. Transitioned to oral pain medication. Consulted general surgery. No surgical intervention indicated. Plan for PO contrast for therapeutic benefit. Patient advanced diet to clear liquids today. Will need bowel regimen as diet advances.      Interval History: NAEON, hypotensive to 83/48. Improved with IVF. Mag 1.5, replaced with 2 g IV. General surgery consulted, no surgical intervention indicated at this time. Plan to give oral contrast as a therapeutic agent. Evaluated patient at bedside. Denies any lightheadedness or weakness given hypotension. Reports pain is more controlled today and would like to try to eat. Advanced diet to clear liquids today as patient reports pain control. Advised patient that diet needs to be advanced slowly. Reports understanding. Denies any bowel movement since 11/27.     Discussed with outpatient  dermatologist Dr. Casanova, plan to remove sutures on L forearm today from recent Mohs surgery.    Update: Spoke to patient's daughter and grand-daughter on the phone. They are upset with care as they feel they are getting conflicting reports from primary and consulting teams. Family states patient was told she needs contrast for imaging per surgery team. However, upon further discussion with surgery, contrast is a therapeutic agent and not intended for imaging in this case. Patient's daughter states that she was told patient needs CT with contrast. I reported that the only further imaging that was discussed was a KUB, but that it was not necessary and results would not  as patient has a complicated abdomen. She stated she trusts what she was told from the surgery team and demanded further imaging. She was also upset that there was no report in patient's chart that her audiology appointment (on 12/02) was rescheduled. I reassured that case management was notified that patient may miss appointment and will assist in rescheduling if patient is still admitted on 12/02.     Review of Systems   Constitutional:  Positive for fever (Reports long-term history of low-grade temperatures of 99). Negative for appetite change, chills and unexpected weight change.   HENT:  Negative for sinus pain and sore throat.    Respiratory:  Positive for cough. Negative for shortness of breath and wheezing.    Cardiovascular:  Negative for chest pain, palpitations and leg swelling.   Gastrointestinal:  Positive for diarrhea and nausea (improved). Negative for anal bleeding.   Genitourinary:  Negative for difficulty urinating and dysuria.   Musculoskeletal:  Negative for back pain.   Skin:  Negative for rash.   Neurological:  Negative for dizziness and headaches.   Hematological:  Does not bruise/bleed easily.   Psychiatric/Behavioral:  Negative for confusion.    All other systems reviewed and are negative.    Objective:      Vital Signs (Most Recent):  Temp: 97.9 °F (36.6 °C) (11/30/22 2043)  Pulse: 67 (11/30/22 2043)  Resp: 16 (11/30/22 2156)  BP: 131/62 (11/30/22 2043)  SpO2: (!) 93 % (11/30/22 2043)   Vital Signs (24h Range):  Temp:  [97.7 °F (36.5 °C)-98.7 °F (37.1 °C)] 97.9 °F (36.6 °C)  Pulse:  [58-67] 67  Resp:  [16-18] 16  SpO2:  [92 %-97 %] 93 %  BP: ()/(48-63) 131/62     Weight: 64.2 kg (141 lb 8.6 oz)  Body mass index is 25.89 kg/m².  No intake or output data in the 24 hours ending 11/30/22 2235     Physical Exam  Vitals and nursing note reviewed.   Constitutional:       General: She is not in acute distress.     Appearance: She is well-developed. She is not toxic-appearing.   HENT:      Head: Normocephalic and atraumatic.      Mouth/Throat:      Mouth: Mucous membranes are moist.      Pharynx: No oropharyngeal exudate.   Eyes:      General: No scleral icterus.     Extraocular Movements: Extraocular movements intact.      Pupils: Pupils are equal, round, and reactive to light.   Cardiovascular:      Rate and Rhythm: Normal rate and regular rhythm.      Pulses: Normal pulses.      Heart sounds: No murmur heard.  Pulmonary:      Effort: Pulmonary effort is normal. No respiratory distress.      Breath sounds: Normal breath sounds. No wheezing or rales.   Chest:      Chest wall: No tenderness.   Abdominal:      General: A surgical scar is present. There is distension (mild).      Palpations: Abdomen is soft.      Tenderness: There is abdominal tenderness (improved). There is no guarding or rebound.   Musculoskeletal:         General: No tenderness.      Right lower leg: No edema.      Left lower leg: No edema.   Skin:     General: Skin is warm and dry.   Neurological:      General: No focal deficit present.      Mental Status: She is alert and oriented to person, place, and time.   Psychiatric:         Mood and Affect: Mood normal.         Behavior: Behavior normal.       Significant Labs: All pertinent labs within the  past 24 hours have been reviewed.    Significant Imaging: I have reviewed all pertinent imaging results/findings within the past 24 hours.      Assessment/Plan:      * Periumbilical abdominal pain  History of colon cancer  - Etiology suspect most likely related to chronic abdominal symptoms related to her known abdominal adhesions.  - CT with no bowel obstruction  - Active bowel sounds, do not suspect an ileus at this time  - She has worsened pain with her chronic post-prandial diarrhea symptoms,  - Likely agents to slow motility may only exacerbate her risk of developing recurrent bowel obstruction,   - Uses miralax PRN  - ED referred patient for admission with concerns for pancreatitis  - CT does not comment on stranding/inflammation at pancreas  - Abdominal pain with mild elevation of lipase could represent pancreatitis symptoms  - Triglyceride wnl, Lactic acid wnl  - Appetite/nausea improved  - Ultimately treatment at this time is likely focused on bowel rest and symptoms management for her abdominal pain and nausea  - S/p morphine 6mg IV x  1  - Norco 5 q8h, MS Contin 15 mg BID  - PRN anti-emetics  - s/p  mL/hr x 10hr given NPO and hypotension  - Advance to clear liquids today  - Will add strict bowel regimen as diet advances  - General surgery consulted, appreciate recs   - ok for PO trial with clears to assess toleration   - patient would likely benefit from a bowel movement   - plan for PO contrast for therapeutic benefit-- given active flatus and initial CT scan, low concern for obstruction or ileus   - avoid narcotic medications   - no surgical intervention indicated    Entrapment of intestine in abdominal adhesions  Chronic issue related to prior abdominal surgeries and extensive scar tissue/adhesions  - per recent surgery notes and during last attempted ex-lap in 3/2022: patient with entrapment of abdominal contents and abdominal tissue with increased bleeding during attempted surgery. Patient is  not a surgical candidate for abdominal procedure unless she were to have life threatening condition and even then is very high bleeding risk.    Myelodysplastic syndrome  - Mild thrombocytopenia, stable mild anemia  - Followed by hematology  - no chronic therapies    Hypomagnesemia  - Magnesium 1.5  - Replaced 2 g IV  - Will monitor on labs    Fecal incontinence  Chronic, stable  - source of recurrent UTI for patient    Type 2 diabetes mellitus without complication, without long-term current use of insulin  Patient's FSGs are controlled on current medication regimen.  Last A1c reviewed-   Lab Results   Component Value Date    HGBA1C 7.0 (H) 09/27/2022     Most recent fingerstick glucose reviewed-   Recent Labs   Lab 11/30/22  0815 11/30/22  1206 11/30/22  1553 11/30/22 2128   POCTGLUCOSE 97 86 84 99     Current correctional scale  Low  Maintain anti-hyperglycemic dose as follows-   Antihyperglycemics (From admission, onward)    Start     Stop Route Frequency Ordered    11/28/22 0148  insulin aspart U-100 pen 0-5 Units         -- SubQ Before meals & nightly PRN 11/28/22 0051        Hold Oral hypoglycemics while patient is in the hospital.    Acquired hypothyroidism  - Continue home Levothyroxine      VTE Risk Mitigation (From admission, onward)         Ordered     enoxaparin injection 40 mg  Daily         11/28/22 0051     IP VTE HIGH RISK PATIENT  Once         11/28/22 0051     Place sequential compression device  Until discontinued         11/28/22 0051                Discharge Planning   LOI: 12/1/2022     Code Status: DNR   Is the patient medically ready for discharge?: No    Reason for patient still in hospital (select all that apply): Patient trending condition and Treatment  Discharge Plan A: Home                  Cal Jordan PA-C  Department of Hospital Medicine   [facility]

## 2022-12-01 NOTE — ASSESSMENT & PLAN NOTE
Patient's FSGs are controlled on current medication regimen.  Last A1c reviewed-   Lab Results   Component Value Date    HGBA1C 7.0 (H) 09/27/2022     Most recent fingerstick glucose reviewed-   Recent Labs   Lab 11/30/22  0815 11/30/22  1206 11/30/22  1553 11/30/22  2128   POCTGLUCOSE 97 86 84 99     Current correctional scale  Low  Maintain anti-hyperglycemic dose as follows-   Antihyperglycemics (From admission, onward)    Start     Stop Route Frequency Ordered    11/28/22 0148  insulin aspart U-100 pen 0-5 Units         -- SubQ Before meals & nightly PRN 11/28/22 0051        Hold Oral hypoglycemics while patient is in the hospital.

## 2022-12-02 VITALS
DIASTOLIC BLOOD PRESSURE: 56 MMHG | WEIGHT: 141.56 LBS | BODY MASS INDEX: 26.05 KG/M2 | HEART RATE: 59 BPM | TEMPERATURE: 98 F | SYSTOLIC BLOOD PRESSURE: 115 MMHG | OXYGEN SATURATION: 94 % | RESPIRATION RATE: 17 BRPM | HEIGHT: 62 IN

## 2022-12-02 LAB
POCT GLUCOSE: 129 MG/DL (ref 70–110)
POCT GLUCOSE: 167 MG/DL (ref 70–110)

## 2022-12-02 PROCEDURE — 99239 PR HOSPITAL DISCHARGE DAY,>30 MIN: ICD-10-PCS | Mod: ,,, | Performed by: HOSPITALIST

## 2022-12-02 PROCEDURE — 99239 HOSP IP/OBS DSCHRG MGMT >30: CPT | Mod: ,,, | Performed by: HOSPITALIST

## 2022-12-02 PROCEDURE — 25000003 PHARM REV CODE 250

## 2022-12-02 PROCEDURE — 25000003 PHARM REV CODE 250: Performed by: HOSPITALIST

## 2022-12-02 RX ORDER — HYDROCODONE BITARTRATE AND ACETAMINOPHEN 5; 325 MG/1; MG/1
1 TABLET ORAL EVERY 8 HOURS PRN
Qty: 30 TABLET | Refills: 0 | Status: SHIPPED | OUTPATIENT
Start: 2022-12-02 | End: 2023-04-17

## 2022-12-02 RX ADMIN — POLYETHYLENE GLYCOL 3350 17 G: 17 POWDER, FOR SOLUTION ORAL at 09:12

## 2022-12-02 RX ADMIN — CETIRIZINE HYDROCHLORIDE 5 MG: 5 TABLET, FILM COATED ORAL at 09:12

## 2022-12-02 RX ADMIN — HYDROCODONE BITARTRATE AND ACETAMINOPHEN 1 TABLET: 5; 325 TABLET ORAL at 06:12

## 2022-12-02 RX ADMIN — MONTELUKAST 10 MG: 10 TABLET, FILM COATED ORAL at 09:12

## 2022-12-02 RX ADMIN — DULOXETINE 30 MG: 30 CAPSULE, DELAYED RELEASE ORAL at 09:12

## 2022-12-02 RX ADMIN — SALINE NASAL SPRAY 1 SPRAY: 1.5 SOLUTION NASAL at 09:12

## 2022-12-02 RX ADMIN — Medication 1000 MG: at 09:12

## 2022-12-02 RX ADMIN — PANTOPRAZOLE SODIUM 40 MG: 40 TABLET, DELAYED RELEASE ORAL at 09:12

## 2022-12-02 RX ADMIN — SENNOSIDES 8.6 MG: 8.6 TABLET, FILM COATED ORAL at 09:12

## 2022-12-02 NOTE — PLAN OF CARE
[facility]  Discharge Final Note    Primary Care Provider: Darci Guthrie MD    Expected Discharge Date: 12/2/2022    Future Appointments   Date Time Provider Department Center   12/7/2022  1:45 PM Toni Osorio MD [facility]   12/8/2022  2:00 PM ARACELIS P[facility]   12/8/2022  2:30 PM [facility]ACELIS [facility]   1/5/2023  8:00 AM Camille Munoz NP [address]       Pt discharged home with no services.  Timoteo Brandon, RN,BSN        Final Discharge Note (most recent)       Final Note - 12/02/22 1129          Final Note    Assessment Type Final Discharge Note (P)      Anticipated Discharge Disposition Home or Self Care (P)      Hospital Resources/Appts/Education Provided Provided patient/caregiver with written discharge plan information;Appointments scheduled and added to AVS (P)         Post-Acute Status    Discharge Delays None known at this time (P)                      Important Message from Medicare  Important Message from Medicare regarding Discharge Appeal Rights: Given to patient/caregiver, Explained to patient/caregiver, Signed/date by patient/caregiver     Date IMM was signed: 12/01/22  Time IMM was signed: 1057    Contact Info       Darci Guthrie MD   Specialty: Internal Medicine   Relationship: PCP - [facility]    [facility]  [facility] [address]   Phone: [phone]       Next Steps: Follow up    Instructions: The nurse with Dr. Guthrie's office will contact you to schedule a hospital follow up visit. However, if you do not hear from them within 48 hours of discharge please contact the office to schedule your visit.

## 2022-12-02 NOTE — PLAN OF CARE
Problem: Adult Inpatient Plan of Care  Goal: Plan of Care Review  Outcome: Met  Goal: Patient-Specific Goal (Individualized)  Outcome: Met  Goal: Absence of Hospital-Acquired Illness or Injury  Outcome: Met  Goal: Optimal Comfort and Wellbeing  Outcome: Met  Goal: Readiness for Transition of Care  Outcome: Met     Problem: Infection  Goal: Absence of Infection Signs and Symptoms  Outcome: Met     Problem: Skin Injury Risk Increased  Goal: Skin Health and Integrity  Outcome: Met     Problem: Diabetes Comorbidity  Goal: Blood Glucose Level Within Targeted Range  Outcome: Met

## 2022-12-02 NOTE — ASSESSMENT & PLAN NOTE
Patient's FSGs are controlled on current medication regimen.  Last A1c reviewed-   Lab Results   Component Value Date    HGBA1C 7.0 (H) 09/27/2022     Most recent fingerstick glucose reviewed-   Recent Labs   Lab 12/01/22  1739 12/01/22  1934 12/02/22  0800 12/02/22  0846   POCTGLUCOSE 117* 133* 129* 167*     Current correctional scale  Low  Maintain anti-hyperglycemic dose as follows-   Antihyperglycemics (From admission, onward)    Start     Stop Route Frequency Ordered    11/28/22 0148  insulin aspart U-100 pen 0-5 Units         -- SubQ Before meals & nightly PRN 11/28/22 0051        Hold Oral hypoglycemics while patient is in the hospital.

## 2022-12-02 NOTE — CARE UPDATE
[facility] unable to schedule PCP hospital follow up visit. Left a vm message requesting provider to contact patient to schedule a hospital follow up visit. I added this info to the AVS for the patient as a reminder

## 2022-12-02 NOTE — DISCHARGE SUMMARY
[facility] - Observation [address] [place] [address] Medicine  Discharge Summary      Patient Name: Anayeli Judd  MRN: [id number]  KAYLENE: [id number]  Patient Class: IP- Inpatient  Admission Date: 11/27/2022  Hospital Length of Stay: 2 days  Discharge Date and Time:  12/02/2022 10:32 AM  Attending Physician: Katie Nicole MD   Discharging Provider: Katie Nicole MD  Primary Care Provider: Darci Guthrie MD  [facility] Medicine Team: [facility] MED Q Katie Nicole MD  Primary Care Team: [facility] MED Q    HPI:   77-year-old woman with a history of colon cancer status post transverse colectomy, severe abdominal wall adhesions 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  She reports that over the past week she has had worsening and persistent periumbilical abdominal pain.  Associated with the pain is abdominal distension and bloating sensation, nausea without vomiting.  She reports having chronic diarrhea generally occurs postprandially and has chronic dark appearing stool she describes as consistency of coffee-grounds.  Abdominal pain is described as spasms often occurring after meals episodes last minutes at a time at home she was not taking oral medications is on no chronic oral opiate therapy, states she has a higher pain tolerance and due to previous bouts of abdominal pain and prior bowel obstructions tries to moderate when she may seek medical care for worsening pain, pain rated as 10/10 very severe earlier today, status post 1 dose of morphine she rates her pain at approximately 8/10, usually states that IV morphine dose will eliminate pain.  She does not have any appetite changes she has new sitters who prepare food for her and if anything she has been eating more food lately.  She also has chronic bowel incontinence, managed with pads or wearing adult diaper. She has no medication  changes. For her bowel habits she reports frequently having to have a bowel movement after any oral intake however she states that despite having loose postprandial bowel movements she often feels she has not emptied her bowels after or and fecal urgency symptoms persist.   She has followed with Dr. Barriga in surgery clinic as well as Dr. Jefferson in GI clinic, is on Cymbalta for chronic pain has previously been on gabapentin for extended period of time but had to discontinue due to side effects or intolerance. She reports being evaluated as an outpatient for possible procedures for treatment of incontinence but states she was not a candidate for them  ER referral for admission was for concern of pancreatitis she does have a elevated lipase of 163 a CT abdomen pelvis noncontrast completed without any pancreatic inflammation noted, also no signs of bowel obstruction but dilated loops of intestine were present      * No surgery found *      Hospital Course:   Anayeli Judd was admitted to Hospital Medicine for abdominal pain. Patient with continued intermittent abdominal cramping. Experiences some relief with prn Morphine. Continuing NPO diet for now. Will advance as abdominal pain improves. Mag 1.5, replaced IV. S/p IVF, will continue as needed. Transitioned to oral pain medication. Consulted general surgery. No surgical intervention indicated. Plan for PO contrast for therapeutic benefit with Methylnaltrexone, Miralax, and Senna with good results.  DC home with prn Norco, but encouraged an aggressive bowel regimen.       Goals of Care Treatment Preferences:  Code Status: DNR    Living Will: Yes              Consults:   Consults (From admission, onward)        Status Ordering Provider     Inpatient consult to General Surgery  Once        Provider:  (Not yet assigned)    Completed TERRY BREWSTER new Assessment & Plan notes have been filed under this hospital service since the last note was  generated.  Service: Hospital Medicine    Final Active Diagnoses:    Diagnosis Date Noted POA    PRINCIPAL PROBLEM:  Periumbilical abdominal pain [R10.33] 11/27/2022 Yes    Entrapment of intestine in abdominal adhesions [K56.50] 03/04/2022 Yes     Chronic    Constipation [K59.00]  Yes    Myelodysplastic syndrome [D46.9] 03/16/2021 Yes    Hypomagnesemia [E83.42] 11/15/2018 Yes    Fecal incontinence [R15.9] 08/14/2017 Yes    Type 2 diabetes mellitus without complication, without long-term current use of insulin [E11.9] 05/05/2015 Yes     Chronic    History of colon cancer [Z85.038] 04/05/2013 Yes     Chronic    Chronic sinusitis of right maxilla [J32.0] 04/05/2013 Yes    Acquired hypothyroidism [E03.9] 01/11/2013 Yes     Chronic      Problems Resolved During this Admission:       Discharged Condition: good    Disposition:     Follow Up:    Patient Instructions:      Ambulatory referral/consult to Outpatient Case Management   Referral Priority: Routine Referral Type: Consultation   Referral Reason: Specialty Services Required   Number of Visits Requested: 1       Medications:  Reconciled Home Medications:      Medication List      START taking these medications    HYDROcodone-acetaminophen 5-325 mg per tablet  Commonly known as: NORCO  Take 1 tablet by mouth every 8 (eight) hours as needed for Pain.        CHANGE how you take these medications    magnesium glycinate 100 mg Tab  Take 500 mg by mouth once daily at 6am.  What changed: how much to take        CONTINUE taking these medications    acetaminophen 650 MG Tbsr  Commonly known as: TYLENOL  Take 1,300 mg by mouth 2 (two) times a day.     ascorbic acid (vitamin C) 1000 MG tablet  Commonly known as: VITAMIN C  Take 1,000 mg by mouth once daily.     atorvastatin 40 MG tablet  Commonly known as: LIPITOR  Take 40 mg by mouth once daily.     biotin 10,000 mcg Tbdl  Take 1 tablet by mouth once daily.     calcium-vitamin D3 500 mg-5 mcg (200 unit) per  tablet  Commonly known as: OS-KASSANDRA 500 + D3  Take 1 tablet by mouth once daily.     cranberry extract 200 mg Cap  Take 1 capsule by mouth once daily.     donepeziL 10 MG tablet  Commonly known as: ARICEPT  Take 1 tablet (10 mg total) by mouth every evening.     DULoxetine 30 MG capsule  Commonly known as: CYMBALTA  TAKE 1 CAPSULE BY MOUTH  TWICE DAILY     ferrous sulfate 324 mg (65 mg iron) Tbec  Take 1 tablet (324 mg total) by mouth once daily.     FREESTYLE EFREN 14 DAY READER Misc  Generic drug: flash glucose scanning reader  Check blood glucose level 3 times daily.     FREESTYLE EFREN 14 DAY SENSOR Kit  Generic drug: flash glucose sensor  1 application by Misc.(Non-Drug; Combo Route) route every 14 (fourteen) days. Disp 30 or 90 day refill     hydrocortisone 2.5 % cream  Apply topically 2 (two) times daily as needed.     levothyroxine 75 MCG tablet  Commonly known as: SYNTHROID  Take 75 mcg by mouth before breakfast.     LIDOcaine 5 %  Commonly known as: LIDODERM  Place 1 patch onto the skin once daily. Remove & Discard patch within 12 hours or as directed by MD     metFORMIN 500 MG tablet  Commonly known as: GLUCOPHAGE  TAKE 1 TABLET(500 MG) BY MOUTH DAILY WITH BREAKFAST     montelukast 10 mg tablet  Commonly known as: SINGULAIR  TAKE 1 TABLET BY MOUTH  DAILY     ondansetron 8 MG Tbdl  Commonly known as: ZOFRAN-ODT  Take 1 tablet (8 mg total) by mouth every 8 (eight) hours as needed (nausea).     pantoprazole 20 MG tablet  Commonly known as: PROTONIX  Take 20 mg by mouth once daily.     polyethylene glycol 17 gram/dose powder  Commonly known as: GLYCOLAX  Take 17 g by mouth daily as needed (Constipation).     PREMARIN vaginal cream  Generic drug: conjugated estrogens  INSERT 1/2 GRAM VAGINALLY  TWICE WEEKLY     promethazine-codeine 6.25-10 mg/5 ml 6.25-10 mg/5 mL syrup  Commonly known as: PHENERGAN with CODEINE  Take 5 mLs by mouth every 6 (six) hours as needed for Cough.     triamcinolone acetonide 0.1% 0.1 %  paste  Commonly known as: KENALOG  APPLY TO THE AFFECTED AREA WITH EVERY-TIP THREE TIMES DAILY     vitamin D 1000 units Tab  Commonly known as: VITAMIN D3  Take 1,000 Units by mouth once daily. D3        STOP taking these medications    cefUROXime 250 MG tablet  Commonly known as: CEFTIN            Indwelling Lines/Drains at time of discharge:   Lines/Drains/Airways     None                 Time spent on the discharge of patient: 35 minutes         Katie Nicole MD  Department of Hospital Medicine  [facility] - Observation 11H

## 2022-12-02 NOTE — SUBJECTIVE & OBJECTIVE
Interval History: No acute events overnight.  Sitter at bedside.  Multiple BM over night.  Feels well.  Ready to go home.  Tolerated diet, no abdominal pain.  Discussed prn Norco on DC, aggressive bowel regimen.  Patient and sitter understood.    Review of Systems   Constitutional:  Negative for chills, fatigue and fever.   HENT:  Positive for postnasal drip.    Respiratory:  Negative for cough and shortness of breath.    Cardiovascular:  Negative for chest pain, palpitations and leg swelling.   Gastrointestinal:  Negative for abdominal pain, constipation, diarrhea, nausea and vomiting.   Genitourinary:  Negative for dysuria and urgency.   Neurological:  Negative for dizziness and headaches.   All other systems reviewed and are negative.  Objective:     Vital Signs (Most Recent):  Temp: 97.9 °F (36.6 °C) (12/02/22 0845)  Pulse: (!) 59 (12/02/22 0845)  Resp: 17 (12/02/22 0845)  BP: (!) 115/56 (12/02/22 0845)  SpO2: (!) 94 % (12/02/22 0845)   Vital Signs (24h Range):  Temp:  [96.2 °F (35.7 °C)-98.5 °F (36.9 °C)] 97.9 °F (36.6 °C)  Pulse:  [59-67] 59  Resp:  [16-18] 17  SpO2:  [93 %-98 %] 94 %  BP: (104-137)/(49-61) 115/56     Weight: 64.2 kg (141 lb 8.6 oz)  Body mass index is 25.89 kg/m².  No intake or output data in the 24 hours ending 12/02/22 1030   Physical Exam  Constitutional:       Appearance: Normal appearance. She is well-developed.   HENT:      Head: Normocephalic and atraumatic.   Cardiovascular:      Rate and Rhythm: Normal rate and regular rhythm.      Heart sounds: No murmur heard.  Pulmonary:      Effort: Pulmonary effort is normal. No respiratory distress.      Breath sounds: Normal breath sounds. No wheezing or rales.   Abdominal:      General: There is no distension.      Palpations: Abdomen is soft.      Tenderness: There is no abdominal tenderness.   Musculoskeletal:         General: No deformity.   Skin:     General: Skin is warm.   Neurological:      General: No focal deficit present.       Mental Status: She is alert and oriented to person, place, and time.       Significant Labs: All pertinent labs within the past 24 hours have been reviewed.    Significant Imaging: I have reviewed all pertinent imaging results/findings within the past 24 hours.

## 2022-12-02 NOTE — ASSESSMENT & PLAN NOTE
· Reports chronic abdominal pain, likely from adhesions from previous surgery  · Gen Surg consulted: Feel like constipation is a contributing factor, exacerbated by narcotics, no concern for obstruction, needs an aggressive bowel regimen  · KUB without obustrction  · Continue Norco  · Methylnaltrexone x 1 and Gastrograffin with good results  · Miralax BID  · Senna daily

## 2022-12-02 NOTE — PROGRESS NOTES
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  She reports that over the past week she has had worsening and persistent periumbilical abdominal pain.  Associated with the pain is abdominal distension and bloating sensation, nausea without vomiting.  She reports having chronic diarrhea generally occurs postprandially and has chronic dark appearing stool she describes as consistency of coffee-grounds.  Abdominal pain is described as spasms often occurring after meals episodes last minutes at a time at home she was not taking oral medications is on no chronic oral opiate therapy, states she has a higher pain tolerance and due to previous bouts of abdominal pain and prior bowel obstructions tries to moderate when she may seek medical care for worsening pain, pain rated as 10/10 very severe earlier today, status post 1 dose of morphine she rates her pain at approximately 8/10, usually states that IV morphine dose will eliminate pain.  She does not have any appetite changes she has new sitters who prepare food for her and if anything she has been eating more food lately.  She also has chronic bowel incontinence, managed with pads or wearing adult diaper. She has no medication changes. For her bowel habits she reports frequently having to have a bowel movement after any oral intake however she states that despite having  loose postprandial bowel movements she often feels she has not emptied her bowels after or and fecal urgency symptoms persist.   She has followed with Dr. Barriga in surgery clinic as well as Dr. Jefferson in GI clinic, is on Cymbalta for chronic pain has previously been on gabapentin for extended period of time but had to discontinue due to side effects or intolerance. She reports being evaluated as an outpatient for possible procedures for treatment of incontinence but states she was not a candidate for them  ER referral for admission was for concern of pancreatitis she does have a elevated lipase of 163 a CT abdomen pelvis noncontrast completed without any pancreatic inflammation noted, also no signs of bowel obstruction but dilated loops of intestine were present      Overview/Hospital Course:  Anayeli Judd was admitted to Hospital Medicine for abdominal pain. Patient with continued intermittent abdominal cramping. Experiences some relief with prn Morphine. Continuing NPO diet for now. Will advance as abdominal pain improves. Mag 1.5, replaced IV. S/p IVF, will continue as needed. Transitioned to oral pain medication. Consulted general surgery. No surgical intervention indicated. Plan for PO contrast for therapeutic benefit with Methylnaltrexone, Miralax, and Senna with good results.  DC home with prn Norco, but encouraged an aggressive bowel regimen.      Interval History: No acute events overnight.  Sitter at bedside.  Multiple BM over night.  Feels well.  Ready to go home.  Tolerated diet, no abdominal pain.  Discussed prn Norco on DC, aggressive bowel regimen.  Patient and sitter understood.    Review of Systems   Constitutional:  Negative for chills, fatigue and fever.   HENT:  Positive for postnasal drip.    Respiratory:  Negative for cough and shortness of breath.    Cardiovascular:  Negative for chest pain, palpitations and leg swelling.   Gastrointestinal:  Negative for abdominal pain,  constipation, diarrhea, nausea and vomiting.   Genitourinary:  Negative for dysuria and urgency.   Neurological:  Negative for dizziness and headaches.   All other systems reviewed and are negative.  Objective:     Vital Signs (Most Recent):  Temp: 97.9 °F (36.6 °C) (12/02/22 0845)  Pulse: (!) 59 (12/02/22 0845)  Resp: 17 (12/02/22 0845)  BP: (!) 115/56 (12/02/22 0845)  SpO2: (!) 94 % (12/02/22 0845)   Vital Signs (24h Range):  Temp:  [96.2 °F (35.7 °C)-98.5 °F (36.9 °C)] 97.9 °F (36.6 °C)  Pulse:  [59-67] 59  Resp:  [16-18] 17  SpO2:  [93 %-98 %] 94 %  BP: (104-137)/(49-61) 115/56     Weight: 64.2 kg (141 lb 8.6 oz)  Body mass index is 25.89 kg/m².  No intake or output data in the 24 hours ending 12/02/22 1030   Physical Exam  Constitutional:       Appearance: Normal appearance. She is well-developed.   HENT:      Head: Normocephalic and atraumatic.   Cardiovascular:      Rate and Rhythm: Normal rate and regular rhythm.      Heart sounds: No murmur heard.  Pulmonary:      Effort: Pulmonary effort is normal. No respiratory distress.      Breath sounds: Normal breath sounds. No wheezing or rales.   Abdominal:      General: There is no distension.      Palpations: Abdomen is soft.      Tenderness: There is no abdominal tenderness.   Musculoskeletal:         General: No deformity.   Skin:     General: Skin is warm.   Neurological:      General: No focal deficit present.      Mental Status: She is alert and oriented to person, place, and time.       Significant Labs: All pertinent labs within the past 24 hours have been reviewed.    Significant Imaging: I have reviewed all pertinent imaging results/findings within the past 24 hours.      Assessment/Plan:      * Periumbilical abdominal pain  · Reports chronic abdominal pain, likely from adhesions from previous surgery  · Gen Surg consulted: Feel like constipation is a contributing factor, exacerbated by narcotics, no concern for obstruction, needs an aggressive bowel  regimen  · KUB without obustrction  · Continue Norco  · Methylnaltrexone x 1 and Gastrograffin with good results  · Miralax BID  · Senna daily    Entrapment of intestine in abdominal adhesions  · Chronic issue related to prior abdominal surgeries and extensive scar tissue/adhesions  · Per recent surgery notes and during last attempted ex-lap in 3/2022: patient with entrapment of abdominal contents and abdominal tissue with increased bleeding during attempted surgery.   · Patient is not a surgical candidate for abdominal procedure unless she were to have life threatening condition and even then is very high bleeding risk.    Constipation  · Resolved      Myelodysplastic syndrome  · Mild thrombocytopenia, stable mild anemia  · Followed by hematology  · No chronic therapies    Hypomagnesemia  · Replaced    Fecal incontinence  · Chronic, stable  · Source of recurrent UTI for patient    Type 2 diabetes mellitus without complication, without long-term current use of insulin  Patient's FSGs are controlled on current medication regimen.  Last A1c reviewed-   Lab Results   Component Value Date    HGBA1C 7.0 (H) 09/27/2022     Most recent fingerstick glucose reviewed-   Recent Labs   Lab 12/01/22  1739 12/01/22  1934 12/02/22  0800 12/02/22  0846   POCTGLUCOSE 117* 133* 129* 167*     Current correctional scale  Low  Maintain anti-hyperglycemic dose as follows-   Antihyperglycemics (From admission, onward)    Start     Stop Route Frequency Ordered    11/28/22 0148  insulin aspart U-100 pen 0-5 Units         -- SubQ Before meals & nightly PRN 11/28/22 0051        Hold Oral hypoglycemics while patient is in the hospital.    Chronic sinusitis of right maxilla  · Trial of Ocean Nasal Spray  · Start Zyrtec      History of colon cancer  · Noted      Acquired hypothyroidism  · Chronic and stable  · Continue home Levothyroxine      VTE Risk Mitigation (From admission, onward)         Ordered     enoxaparin injection 40 mg  Daily          11/28/22 0051     IP VTE HIGH RISK PATIENT  Once         11/28/22 0051     Place sequential compression device  Until discontinued         11/28/22 0051                Discharge Planning   LOI: 12/2/2022     Code Status: DNR   Is the patient medically ready for discharge?: No    Reason for patient still in hospital (select all that apply): Pending disposition  Discharge Plan A: Home                  Katie Nicole MD  Department of Hospital Medicine   [facility] - Observation 11H

## 2022-12-02 NOTE — PLAN OF CARE
POC reviewed w/ pt. Denies pain/discomfort. No signs of acute distress.  Problem: Adult Inpatient Plan of Care  Goal: Plan of Care Review  Outcome: Ongoing, Progressing  Goal: Patient-Specific Goal (Individualized)  Outcome: Ongoing, Progressing  Goal: Absence of Hospital-Acquired Illness or Injury  Outcome: Ongoing, Progressing  Goal: Optimal Comfort and Wellbeing  Outcome: Ongoing, Progressing  Goal: Readiness for Transition of Care  Outcome: Ongoing, Progressing     Problem: Infection  Goal: Absence of Infection Signs and Symptoms  Outcome: Ongoing, Progressing     Problem: Skin Injury Risk Increased  Goal: Skin Health and Integrity  Outcome: Ongoing, Progressing     Problem: Diabetes Comorbidity  Goal: Blood Glucose Level Within Targeted Range  Outcome: Ongoing, Progressing

## 2022-12-04 ENCOUNTER — PATIENT MESSAGE (OUTPATIENT)
Dept: INTERNAL MEDICINE | Facility: CLINIC | Age: 77
End: 2022-12-04
Payer: MEDICARE

## 2022-12-05 ENCOUNTER — PATIENT MESSAGE (OUTPATIENT)
Dept: PAIN MEDICINE | Facility: CLINIC | Age: 77
End: 2022-12-05
Payer: MEDICARE

## 2022-12-06 ENCOUNTER — PATIENT MESSAGE (OUTPATIENT)
Dept: INTERNAL MEDICINE | Facility: CLINIC | Age: 77
End: 2022-12-06
Payer: MEDICARE

## 2022-12-07 ENCOUNTER — PATIENT OUTREACH (OUTPATIENT)
Dept: ADMINISTRATIVE | Facility: OTHER | Age: 77
End: 2022-12-07
Payer: MEDICARE

## 2022-12-07 NOTE — PROGRESS NOTES
CHW - Initial Contact    This Community Health Worker updated the Social Determinant of Health questionnaire with patient via telephone today.    Pt identified barriers of most importance are: None Pt stated she is good and doesn't need any community resources at this time  Referrals to community agencies completed with patient/caregiver consent outside of [facility] include: None  Referrals were put through [facility] - None  Support and Services: No support & services have been documented.  Other information discussed the patient needs / wants help with: None  Follow up required: No  No future outreach task assigned

## 2022-12-07 NOTE — PROGRESS NOTES
CHW - Case Closure    This Community Health Worker spoke to patient via telephone today.   Pt/Caregiver reported: Pt stated she doesn't need any community resources at this time  Pt/Caregiver denied any additional needs at this time and agrees with episode closure at this time.  Provided patient with Community Health Worker's contact information and encouraged him/her to contact this Community Health Worker if additional needs arise.

## 2022-12-08 ENCOUNTER — PATIENT MESSAGE (OUTPATIENT)
Dept: INTERNAL MEDICINE | Facility: CLINIC | Age: 77
End: 2022-12-08

## 2022-12-08 ENCOUNTER — CLINICAL SUPPORT (OUTPATIENT)
Dept: AUDIOLOGY | Facility: CLINIC | Age: 77
End: 2022-12-08
Payer: MEDICARE

## 2022-12-08 ENCOUNTER — PATIENT MESSAGE (OUTPATIENT)
Dept: PAIN MEDICINE | Facility: OTHER | Age: 77
End: 2022-12-08
Payer: MEDICARE

## 2022-12-08 ENCOUNTER — OFFICE VISIT (OUTPATIENT)
Dept: INTERNAL MEDICINE | Facility: CLINIC | Age: 77
End: 2022-12-08
Payer: MEDICARE

## 2022-12-08 VITALS
WEIGHT: 134.94 LBS | DIASTOLIC BLOOD PRESSURE: 61 MMHG | HEART RATE: 56 BPM | BODY MASS INDEX: 24.68 KG/M2 | SYSTOLIC BLOOD PRESSURE: 131 MMHG

## 2022-12-08 DIAGNOSIS — E11.69 TYPE 2 DIABETES MELLITUS WITH OTHER SPECIFIED COMPLICATION, WITHOUT LONG-TERM CURRENT USE OF INSULIN: ICD-10-CM

## 2022-12-08 DIAGNOSIS — Z09 HOSPITAL DISCHARGE FOLLOW-UP: Primary | ICD-10-CM

## 2022-12-08 DIAGNOSIS — M54.16 LUMBAR RADICULOPATHY: Primary | ICD-10-CM

## 2022-12-08 DIAGNOSIS — H91.90 HEARING LOSS, UNSPECIFIED HEARING LOSS TYPE, UNSPECIFIED LATERALITY: ICD-10-CM

## 2022-12-08 DIAGNOSIS — E83.42 HYPOMAGNESEMIA: ICD-10-CM

## 2022-12-08 DIAGNOSIS — H90.3 SENSORINEURAL HEARING LOSS (SNHL) OF BOTH EARS: Primary | ICD-10-CM

## 2022-12-08 DIAGNOSIS — M54.9 BACK PAIN, UNSPECIFIED BACK LOCATION, UNSPECIFIED BACK PAIN LATERALITY, UNSPECIFIED CHRONICITY: ICD-10-CM

## 2022-12-08 DIAGNOSIS — D69.6 THROMBOCYTOPENIA: ICD-10-CM

## 2022-12-08 DIAGNOSIS — Z97.4 USES HEARING AID: ICD-10-CM

## 2022-12-08 DIAGNOSIS — E03.4 HYPOTHYROIDISM DUE TO ACQUIRED ATROPHY OF THYROID: ICD-10-CM

## 2022-12-08 DIAGNOSIS — H90.3 SENSORINEURAL HEARING LOSS (SNHL), BILATERAL: Primary | ICD-10-CM

## 2022-12-08 LAB
ANION GAP SERPL CALC-SCNC: 9 MMOL/L (ref 8–16)
BASOPHILS # BLD AUTO: 0.03 K/UL (ref 0–0.2)
BASOPHILS NFR BLD: 0.6 % (ref 0–1.9)
BUN SERPL-MCNC: 8 MG/DL (ref 8–23)
CALCIUM SERPL-MCNC: 9.6 MG/DL (ref 8.7–10.5)
CHLORIDE SERPL-SCNC: 105 MMOL/L (ref 95–110)
CO2 SERPL-SCNC: 22 MMOL/L (ref 23–29)
CREAT SERPL-MCNC: 0.8 MG/DL (ref 0.5–1.4)
DIFFERENTIAL METHOD: ABNORMAL
EOSINOPHIL # BLD AUTO: 0.2 K/UL (ref 0–0.5)
EOSINOPHIL NFR BLD: 4.2 % (ref 0–8)
ERYTHROCYTE [DISTWIDTH] IN BLOOD BY AUTOMATED COUNT: 14.7 % (ref 11.5–14.5)
EST. GFR  (NO RACE VARIABLE): >60 ML/MIN/1.73 M^2
GLUCOSE SERPL-MCNC: 142 MG/DL (ref 70–110)
HCT VFR BLD AUTO: 34.6 % (ref 37–48.5)
HGB BLD-MCNC: 11.5 G/DL (ref 12–16)
IMM GRANULOCYTES # BLD AUTO: 0.01 K/UL (ref 0–0.04)
IMM GRANULOCYTES NFR BLD AUTO: 0.2 % (ref 0–0.5)
LYMPHOCYTES # BLD AUTO: 1.5 K/UL (ref 1–4.8)
LYMPHOCYTES NFR BLD: 29.3 % (ref 18–48)
MAGNESIUM SERPL-MCNC: 1.6 MG/DL (ref 1.6–2.6)
MCH RBC QN AUTO: 32.8 PG (ref 27–31)
MCHC RBC AUTO-ENTMCNC: 33.2 G/DL (ref 32–36)
MCV RBC AUTO: 99 FL (ref 82–98)
MONOCYTES # BLD AUTO: 0.6 K/UL (ref 0.3–1)
MONOCYTES NFR BLD: 11.5 % (ref 4–15)
NEUTROPHILS # BLD AUTO: 2.7 K/UL (ref 1.8–7.7)
NEUTROPHILS NFR BLD: 54.2 % (ref 38–73)
NRBC BLD-RTO: 0 /100 WBC
PLATELET # BLD AUTO: 101 K/UL (ref 150–450)
PMV BLD AUTO: 10.4 FL (ref 9.2–12.9)
POTASSIUM SERPL-SCNC: 4.1 MMOL/L (ref 3.5–5.1)
RBC # BLD AUTO: 3.51 M/UL (ref 4–5.4)
SODIUM SERPL-SCNC: 136 MMOL/L (ref 136–145)
T4 FREE SERPL-MCNC: 0.88 NG/DL (ref 0.71–1.51)
TSH SERPL DL<=0.005 MIU/L-ACNC: 7.43 UIU/ML (ref 0.4–4)
WBC # BLD AUTO: 4.95 K/UL (ref 3.9–12.7)

## 2022-12-08 PROCEDURE — 92567 TYMPANOMETRY: CPT | Mod: PBBFAC

## 2022-12-08 PROCEDURE — 99999 PR PBB SHADOW E&M-EST. PATIENT-LVL I: CPT | Mod: PBBFAC,,,

## 2022-12-08 PROCEDURE — 92557 COMPREHENSIVE HEARING TEST: CPT | Mod: PBBFAC

## 2022-12-08 PROCEDURE — 99214 OFFICE O/P EST MOD 30 MIN: CPT | Mod: S$PBB,,, | Performed by: INTERNAL MEDICINE

## 2022-12-08 PROCEDURE — 99999 PR PBB SHADOW E&M-EST. PATIENT-LVL I: ICD-10-PCS | Mod: PBBFAC,,,

## 2022-12-08 PROCEDURE — 85025 COMPLETE CBC W/AUTO DIFF WBC: CPT | Performed by: INTERNAL MEDICINE

## 2022-12-08 PROCEDURE — 80048 BASIC METABOLIC PNL TOTAL CA: CPT | Performed by: INTERNAL MEDICINE

## 2022-12-08 PROCEDURE — 36415 COLL VENOUS BLD VENIPUNCTURE: CPT | Performed by: INTERNAL MEDICINE

## 2022-12-08 PROCEDURE — 84443 ASSAY THYROID STIM HORMONE: CPT | Performed by: INTERNAL MEDICINE

## 2022-12-08 PROCEDURE — 99999 PR PBB SHADOW E&M-EST. PATIENT-LVL IV: CPT | Mod: PBBFAC,,, | Performed by: INTERNAL MEDICINE

## 2022-12-08 PROCEDURE — 84439 ASSAY OF FREE THYROXINE: CPT | Performed by: INTERNAL MEDICINE

## 2022-12-08 PROCEDURE — 99211 OFF/OP EST MAY X REQ PHY/QHP: CPT | Mod: PBBFAC

## 2022-12-08 PROCEDURE — 99214 PR OFFICE/OUTPT VISIT, EST, LEVL IV, 30-39 MIN: ICD-10-PCS | Mod: S$PBB,,, | Performed by: INTERNAL MEDICINE

## 2022-12-08 PROCEDURE — 99999 PR PBB SHADOW E&M-EST. PATIENT-LVL IV: ICD-10-PCS | Mod: PBBFAC,,, | Performed by: INTERNAL MEDICINE

## 2022-12-08 PROCEDURE — 99214 OFFICE O/P EST MOD 30 MIN: CPT | Mod: PBBFAC,27 | Performed by: INTERNAL MEDICINE

## 2022-12-08 PROCEDURE — 83735 ASSAY OF MAGNESIUM: CPT | Performed by: INTERNAL MEDICINE

## 2022-12-08 RX ORDER — LIDOCAINE 50 MG/G
PATCH TOPICAL
Qty: 30 PATCH | Refills: 0 | Status: SHIPPED | OUTPATIENT
Start: 2022-12-08 | End: 2023-01-05

## 2022-12-08 NOTE — Clinical Note
Hi Dr. Guthrie,   Here are the results from Ms. Judd's hearing evaluation. Please let me know if you need any further information or have any questions. Thanks!  Huang Doe

## 2022-12-08 NOTE — PROGRESS NOTES
Anayeli Judd, a 77 y.o. female, was seen today in the clinic for an audiologic evaluation.  The patient reported hearing loss in both ears.  She wears [employer] [place] hearing aids.  Ms. Judd reported aural fullness in both ears that is worse in her left ear.  She stated that her left ear feels sore.  There were no reports of tinnitus, otalgia, or dizziness.    Tympanometry revealed Type As in the right ear and Type As in the left ear.  Audiogram results revealed a mild sloping to severe sensorineural hearing loss (SNHL) in the right ear and a normal sloping to moderately-severe SNHL in the left ear.  Speech reception thresholds were noted at 40 dB in the right ear and 40 dB in the left ear.  Speech discrimination scores were 72% in the right ear and 84% in the left ear.    Recommendations:  Otologic evaluation  Hearing protection when in noise  Continue use of binaural amplification  Hearing aid consultation as scheduled   Annual audiogram

## 2022-12-09 NOTE — PROGRESS NOTES
HEARING AID CONSULTATION    Anayeli Judd, a 77 y.o. female, was seen today for a hearing aid consultation.  Ms. Judd currently wears Oticon hearing aids she purchased from a different hearing aid center.  Her current Oticon hearing aids were cleaned/checked and were in good working condition.  Ms. Judd was advised that we can program her hearing aids here for a transfer fee or she can purchase new hearing aids.  Ms. Judd decided to purchase premium level hearing aids.  She also selected to get molds for the hearing aids as she does not like the domes.  Ms. Judd decided to purchase Oticon MORE 1 miniRITE R hearing aids in terracotta.  Ms. Judd was advised of the 30 day trial period.  Ms. Judd had ear mold impressions taken without incident, for custom molds.  A hearing aid fitting was scheduled and Ms. Judd paid the $250 deposit.         Hearing aid selection: Oticon MORE 1 miniRITE R, [facility], 2 85

## 2022-12-09 NOTE — PROGRESS NOTES
Subjective:       Patient ID: Anayeli Judd is a 77 y.o. female.    Chief Complaint: Hospital f/u (Constipation, abdominal pain)    HPI:  Hospital f/u. Admitted for bowel rest and pain mgmt in setting of constipation and abdominal pain. Hx of recurrent SBO and adhesions. No obvious SBO on imaging this time. Responded well to po contrast eventually. Back to baseline of loose stools with use of polyethylene glycol daily since hospital.  Lumbar pain continues to be main issue. Has stopped gabapentin. Though Norco helped, has stopped since hospital. Just does not feel mentally clear when on either of these meds. Planned for steroid injection with Pain Mgmt soon (known lumbar djd hx). Does note Lidoderm patches help. Only using 1 patch daily and has 3 potential spots she notes would benefit between thoracic and lumbar regions.      Past Medical History:   Diagnosis Date    Abdominal pain     Allergic rhinitis     Arthritis     Blood platelet disorder     Blood platelet disorder     Blood transfusion     during delivery and     Bowel obstruction     Cervical radiculopathy     followed by dr cloud    Colon cancer     transverse colon; resected; Stage IIA (pT3 pN0 MX)    Degenerative joint disease (DJD) of lumbar spine     Diabetes mellitus     Diarrhea     Falls 2020    Family history of breast cancer     Family history of colon cancer     Fatty liver     GERD (gastroesophageal reflux disease)     History of shingles     Hyperlipidemia     Hypomagnesemia     Hypothyroidism     Irritable bowel syndrome     Microscopic colitis     treated     Migraine     Myelodysplastic syndrome     Raynaud phenomenon     Raynaud's disease     Squamous cell carcinoma of skin     Type 2 diabetes mellitus          Current Outpatient Medications:     acetaminophen (TYLENOL) 650 MG TbSR, Take 1,300 mg by mouth 2 (two) times a day., Disp: , Rfl:     ascorbic acid, vitamin C, (VITAMIN C) 1000 MG tablet, Take 1,000 mg by  mouth once daily., Disp: , Rfl:     atorvastatin (LIPITOR) 40 MG tablet, Take 40 mg by mouth once daily., Disp: , Rfl:     biotin 10,000 mcg TbDL, Take 1 tablet by mouth once daily. , Disp: , Rfl:     calcium-vitamin D3 (OS-KASSANDRA 500 + D3) 500 mg-5 mcg (200 unit) per tablet, Take 1 tablet by mouth once daily., Disp: , Rfl:     conjugated estrogens (PREMARIN) vaginal cream, INSERT 1/2 GRAM VAGINALLY  TWICE WEEKLY, Disp: 30 g, Rfl: 3    cranberry extract 200 mg Cap, Take 1 capsule by mouth once daily., Disp: , Rfl:     donepeziL (ARICEPT) 10 MG tablet, Take 1 tablet (10 mg total) by mouth every evening., Disp: 90 tablet, Rfl: 3    DULoxetine (CYMBALTA) 30 MG capsule, TAKE 1 CAPSULE BY MOUTH  TWICE DAILY (Patient taking differently: Take 30 mg by mouth 2 (two) times daily.), Disp: 180 capsule, Rfl: 1    ferrous sulfate 324 mg (65 mg iron) TbEC, Take 1 tablet (324 mg total) by mouth once daily., Disp: , Rfl: 0    flash glucose scanning reader (FREESTYLE EFREN 14 DAY READER) Misc, Check blood glucose level 3 times daily., Disp: 1 each, Rfl: 0    flash glucose sensor (FREESTYLE EFREN 14 DAY SENSOR) Kit, 1 application by Misc.(Non-Drug; Combo Route) route every 14 (fourteen) days. Disp 30 or 90 day refill, Disp: 6 kit, Rfl: 3    hydrocortisone 2.5 % cream, Apply topically 2 (two) times daily as needed., Disp: 1 each, Rfl: 1    levothyroxine (SYNTHROID) 75 MCG tablet, Take 75 mcg by mouth before breakfast., Disp: , Rfl:     magnesium glycinate 100 mg Tab, Take 500 mg by mouth once daily at 6am., Disp: , Rfl:     metFORMIN (GLUCOPHAGE) 500 MG tablet, TAKE 1 TABLET(500 MG) BY MOUTH DAILY WITH BREAKFAST, Disp: 90 tablet, Rfl: 1    montelukast (SINGULAIR) 10 mg tablet, TAKE 1 TABLET BY MOUTH  DAILY, Disp: 90 tablet, Rfl: 3    ondansetron (ZOFRAN-ODT) 8 MG TbDL, Take 1 tablet (8 mg total) by mouth every 8 (eight) hours as needed (nausea)., Disp: 30 tablet, Rfl: 0    pantoprazole (PROTONIX) 20 MG tablet, Take 20 mg by mouth once  daily., Disp: , Rfl:     polyethylene glycol (GLYCOLAX) 17 gram/dose powder, Take 17 g by mouth daily as needed (Constipation)., Disp: 1530 g, Rfl: 0    vitamin D 1000 units Tab, Take 1,000 Units by mouth once daily. D3, Disp: , Rfl:     HYDROcodone-acetaminophen (NORCO) 5-325 mg per tablet, Take 1 tablet by mouth every 8 (eight) hours as needed for Pain. (Patient not taking: Reported on 2022), Disp: 30 tablet, Rfl: 0    LIDOcaine (LIDODERM) 5 %, Apply 1 to 3 patches to back daily. Remove & Discard patches within 12 hours., Disp: 30 patch, Rfl: 0    triamcinolone acetonide 0.1% (KENALOG) 0.1 % paste, APPLY TO THE AFFECTED AREA WITH EVERY-TIP THREE TIMES DAILY, Disp: , Rfl:     Past Surgical History:   Procedure Laterality Date    ADENOIDECTOMY      APPENDECTOMY      BACK SURGERY      CARPAL TUNNEL RELEASE      bilateral      SECTION      CHOLECYSTECTOMY  1965    COLECTOMY  2011    Transverse colon resection by Dr. Aguirre    COLONOSCOPY N/A 2017    Procedure: COLONOSCOPY;  Surgeon: Manjit Alvarez MD;  Location: [facility] (4TH FLR);  Service: Endoscopy;  Laterality: N/A;    COLONOSCOPY N/A 2019    Procedure: COLONOSCOPY;  Surgeon: Ramiro Jefferson MD;  Location: [facility] (4TH FLR);  Service: Endoscopy;  Laterality: N/A;  PM prep    COLONOSCOPY N/A 2022    Procedure: COLONOSCOPY;  Surgeon: Ramiro Jefferson MD;  Location: [facility] (2ND FLR);  Service: Endoscopy;  Laterality: N/A;  EGD and colonoscopy in 6-8 weeks from now     states has constipation. -extended Golytely prep    CYSTOSCOPY N/A 2021    Procedure: CYSTOSCOPY;  Surgeon: Viridiana Valenzulea MD;  [address]  Service: Urology;  Laterality: N/A;    ENDOSCOPIC ULTRASOUND OF UPPER GASTROINTESTINAL TRACT N/A 2018    Procedure: ULTRASOUND, UPPER GI TRACT, ENDOSCOPIC;  Surgeon: Jose Hess MD;  Location: [facility];  Service: Endoscopy;  Laterality: N/A;    ENDOSCOPIC ULTRASOUND OF UPPER  GASTROINTESTINAL TRACT N/A 01/23/2019    Procedure: ULTRASOUND, UPPER GI TRACT, ENDOSCOPIC;  Surgeon: Jose Hess MD;  Location: [facility] ENDO (2ND FLR);  Service: Endoscopy;  Laterality: N/A;    ESOPHAGOGASTRODUODENOSCOPY N/A 11/16/2018    Procedure: EGD (ESOPHAGOGASTRODUODENOSCOPY);  Surgeon: Angelo Reynolds MD;  Location: [facility] ENDO (2ND FLR);  Service: Endoscopy;  Laterality: N/A;    ESOPHAGOGASTRODUODENOSCOPY N/A 06/26/2019    Procedure: EGD (ESOPHAGOGASTRODUODENOSCOPY);  Surgeon: Ramiro Jefferson MD;  Location: [facility] ENDO (4TH FLR);  Service: Endoscopy;  Laterality: N/A;    ESOPHAGOGASTRODUODENOSCOPY N/A 5/4/2022    Procedure: EGD (ESOPHAGOGASTRODUODENOSCOPY);  Surgeon: Ramiro Jefferson MD;  Location: [facility] ENDO (2ND FLR);  Service: Endoscopy;  Laterality: N/A;  fully vaccinated-GT    EYE SURGERY      Cataract Removal    FLUOROSCOPIC URODYNAMIC STUDY N/A 11/09/2021    Procedure: URODYNAMIC STUDY, FLUOROSCOPIC;  Surgeon: Viridiana Valenzuela MD;  Location: [facility] OR 1ST FLR;  Service: Urology;  Laterality: N/A;  90 minutes     HYSTERECTOMY      JOINT REPLACEMENT      posterolateral fusion with autograft bone and [place] mineralized bone matrix  02/01/2013    at [facility] for lumbar spine stenosis    SMALL INTESTINE SURGERY      SPINE SURGERY      TOE SURGERY      TONSILLECTOMY      TRIGGER FINGER RELEASE         Social History     Tobacco Use    Smoking status: Never    Smokeless tobacco: Never   Substance Use Topics    Alcohol use: Not Currently     Comment: socially, rarely    Drug use: Never         Objective:      Vitals:    12/08/22 1134   BP: 131/61   Pulse: (!) 56       Physical Exam  Constitutional:       General: She is not in acute distress.     Appearance: Normal appearance. She is not ill-appearing.   Eyes:      Extraocular Movements: Extraocular movements intact.      Conjunctiva/sclera: Conjunctivae normal.   Cardiovascular:      Rate and Rhythm: Bradycardia present.   Pulmonary:      Effort: Pulmonary  effort is normal. No respiratory distress.   Abdominal:      General: Abdomen is flat. There is no distension.      Palpations: Abdomen is soft. There is no mass.      Tenderness: There is no guarding or rebound.   Musculoskeletal:         General: Tenderness (Mild lumbar, right thoracic.) present. No swelling or signs of injury.      Right lower leg: No edema.      Left lower leg: No edema.   Neurological:      Mental Status: She is alert and oriented to person, place, and time. Mental status is at baseline.   Psychiatric:         Mood and Affect: Mood normal.         Behavior: Behavior normal.         Thought Content: Thought content normal.         Judgment: Judgment normal.         Recent Results (from the past 168 hour(s))   POCT glucose    Collection Time: 12/02/22  8:46 AM   Result Value Ref Range    POCT Glucose 167 (H) 70 - 110 mg/dL   Magnesium    Collection Time: 12/08/22 12:31 PM   Result Value Ref Range    Magnesium 1.6 1.6 - 2.6 mg/dL   BASIC METABOLIC PANEL    Collection Time: 12/08/22 12:31 PM   Result Value Ref Range    Sodium 136 136 - 145 mmol/L    Potassium 4.1 3.5 - 5.1 mmol/L    Chloride 105 95 - 110 mmol/L    CO2 22 (L) 23 - 29 mmol/L    Glucose 142 (H) 70 - 110 mg/dL    BUN 8 8 - 23 mg/dL    Creatinine 0.8 0.5 - 1.4 mg/dL    Calcium 9.6 8.7 - 10.5 mg/dL    Anion Gap 9 8 - 16 mmol/L    eGFR >60.0 >60 mL/min/1.73 m^2   TSH    Collection Time: 12/08/22 12:31 PM   Result Value Ref Range    TSH 7.426 (H) 0.400 - 4.000 uIU/mL   CBC W/ AUTO DIFFERENTIAL    Collection Time: 12/08/22 12:31 PM   Result Value Ref Range    WBC 4.95 3.90 - 12.70 K/uL    RBC 3.51 (L) 4.00 - 5.40 M/uL    Hemoglobin 11.5 (L) 12.0 - 16.0 g/dL    Hematocrit 34.6 (L) 37.0 - 48.5 %    MCV 99 (H) 82 - 98 fL    MCH 32.8 (H) 27.0 - 31.0 pg    MCHC 33.2 32.0 - 36.0 g/dL    RDW 14.7 (H) 11.5 - 14.5 %    Platelets 101 (L) 150 - 450 K/uL    MPV 10.4 9.2 - 12.9 fL    Immature Granulocytes 0.2 0.0 - 0.5 %    Gran # (ANC) 2.7 1.8 - 7.7  K/uL    Immature Grans (Abs) 0.01 0.00 - 0.04 K/uL    Lymph # 1.5 1.0 - 4.8 K/uL    Mono # 0.6 0.3 - 1.0 K/uL    Eos # 0.2 0.0 - 0.5 K/uL    Baso # 0.03 0.00 - 0.20 K/uL    nRBC 0 0 /100 WBC    Gran % 54.2 38.0 - 73.0 %    Lymph % 29.3 18.0 - 48.0 %    Mono % 11.5 4.0 - 15.0 %    Eosinophil % 4.2 0.0 - 8.0 %    Basophil % 0.6 0.0 - 1.9 %    Differential Method Automated    T4, Free    Collection Time: 12/08/22 12:31 PM   Result Value Ref Range    Free T4 0.88 0.71 - 1.51 ng/dL      Assessment/Plan:     1) Hospital f/u, Constipation, Abdominal pain - D/C'd after bowel rest and return of bm's recently. Back to baseline of loose stools with daily Glycolax at home.  2) Lumbar DJD - Uncontrolled pain but feels much better cognitively since off gabapentin and opiates. Using Lidoderm 1 patch with relief in focal area. Okay to use 3 patches daily (remove after 12 hours). Pain Mgmt following and planning for steroid injection in next 2 wks. If gets good effect, may consider reducing her Cymbalta in near future. Minimizing med list has been a goal of hers that I agree we should look to follow through on as able.  3) Anemia, Thrombocytopenia - Returning to baseline since hospitalization. Follows with Hematology. On daily iron supplement.  4) Hypothyroidism - TSH mildly elevated as expected post recent hospitalization with npo. Continue current Synthroid dosing. Repeat TSH 3 mths.  5) Hypomagnesia - Noted in hospital. Resolved with low normal level. Daughter will check home Mag glycinate supplement and confirm daily dosage she has been taking.

## 2022-12-13 ENCOUNTER — PATIENT MESSAGE (OUTPATIENT)
Dept: INTERNAL MEDICINE | Facility: CLINIC | Age: 77
End: 2022-12-13
Payer: MEDICARE

## 2022-12-14 RX ORDER — PANTOPRAZOLE SODIUM 20 MG/1
20 TABLET, DELAYED RELEASE ORAL DAILY
Qty: 90 TABLET | Refills: 2 | Status: SHIPPED | OUTPATIENT
Start: 2022-12-14 | End: 2023-04-23

## 2022-12-21 ENCOUNTER — HOSPITAL ENCOUNTER (OUTPATIENT)
Facility: OTHER | Age: 77
Discharge: HOME | End: 2022-12-21
Attending: ANESTHESIOLOGY | Admitting: ANESTHESIOLOGY
Payer: MEDICARE

## 2022-12-21 VITALS
WEIGHT: 135 LBS | BODY MASS INDEX: 24.84 KG/M2 | TEMPERATURE: 98 F | HEART RATE: 63 BPM | SYSTOLIC BLOOD PRESSURE: 138 MMHG | DIASTOLIC BLOOD PRESSURE: 75 MMHG | OXYGEN SATURATION: 100 % | HEIGHT: 62 IN | RESPIRATION RATE: 14 BRPM

## 2022-12-21 DIAGNOSIS — M51.36 DDD (DEGENERATIVE DISC DISEASE), LUMBAR: Primary | ICD-10-CM

## 2022-12-21 DIAGNOSIS — M54.16 LUMBAR RADICULOPATHY: ICD-10-CM

## 2022-12-21 DIAGNOSIS — G89.29 CHRONIC PAIN: ICD-10-CM

## 2022-12-21 LAB — POCT GLUCOSE: 165 MG/DL (ref 70–110)

## 2022-12-21 PROCEDURE — 25500020 PHARM REV CODE 255: Performed by: ANESTHESIOLOGY

## 2022-12-21 PROCEDURE — 63600175 PHARM REV CODE 636 W HCPCS: Performed by: ANESTHESIOLOGY

## 2022-12-21 PROCEDURE — 82947 ASSAY GLUCOSE BLOOD QUANT: CPT | Performed by: ANESTHESIOLOGY

## 2022-12-21 PROCEDURE — 25000003 PHARM REV CODE 250: Performed by: ANESTHESIOLOGY

## 2022-12-21 PROCEDURE — 64483 NJX AA&/STRD TFRM EPI L/S 1: CPT | Mod: 50 | Performed by: ANESTHESIOLOGY

## 2022-12-21 PROCEDURE — 64483 PR EPIDURAL INJ, ANES/STEROID, TRANSFORAMINAL, LUMB/SACR, SNGL LEVL: ICD-10-PCS | Mod: 50,,, | Performed by: ANESTHESIOLOGY

## 2022-12-21 PROCEDURE — 64483 NJX AA&/STRD TFRM EPI L/S 1: CPT | Mod: 50,,, | Performed by: ANESTHESIOLOGY

## 2022-12-21 PROCEDURE — 25000003 PHARM REV CODE 250: Performed by: STUDENT IN AN ORGANIZED HEALTH CARE EDUCATION/TRAINING PROGRAM

## 2022-12-21 RX ORDER — MIDAZOLAM HYDROCHLORIDE 1 MG/ML
INJECTION INTRAMUSCULAR; INTRAVENOUS
Status: DISCONTINUED | OUTPATIENT
Start: 2022-12-21 | End: 2022-12-21 | Stop reason: HOSPADM

## 2022-12-21 RX ORDER — LIDOCAINE HYDROCHLORIDE 20 MG/ML
INJECTION, SOLUTION INFILTRATION; PERINEURAL
Status: DISCONTINUED | OUTPATIENT
Start: 2022-12-21 | End: 2022-12-21 | Stop reason: HOSPADM

## 2022-12-21 RX ORDER — LIDOCAINE HYDROCHLORIDE 10 MG/ML
INJECTION, SOLUTION EPIDURAL; INFILTRATION; INTRACAUDAL; PERINEURAL
Status: DISCONTINUED | OUTPATIENT
Start: 2022-12-21 | End: 2022-12-21 | Stop reason: HOSPADM

## 2022-12-21 RX ORDER — FENTANYL CITRATE 50 UG/ML
INJECTION, SOLUTION INTRAMUSCULAR; INTRAVENOUS
Status: DISCONTINUED | OUTPATIENT
Start: 2022-12-21 | End: 2022-12-21 | Stop reason: HOSPADM

## 2022-12-21 RX ORDER — DEXAMETHASONE SODIUM PHOSPHATE 10 MG/ML
INJECTION INTRAMUSCULAR; INTRAVENOUS
Status: DISCONTINUED | OUTPATIENT
Start: 2022-12-21 | End: 2022-12-21 | Stop reason: HOSPADM

## 2022-12-21 RX ORDER — SODIUM CHLORIDE 9 MG/ML
500 INJECTION, SOLUTION INTRAVENOUS CONTINUOUS
Status: DISCONTINUED | OUTPATIENT
Start: 2022-12-21 | End: 2022-12-21 | Stop reason: HOSPADM

## 2022-12-21 NOTE — OP NOTE
Lumbar Transforaminal Epidural Steroid Injection under Fluoroscopic Guidance    The procedure, risks, benefits, and options were discussed with the patient. There are no contraindications to the procedure. The patent expressed understanding and agreed to the procedure. Informed written consent was obtained prior to the start of the procedure and can be found in the patient's chart.    PATIENT NAME: Anayeli Judd   MRN: [id number]     DATE OF PROCEDURE: 12/21/2022    PROCEDURE:  Bilateral  L5/S1 Lumbar Transforaminal Epidural Steroid Injection under Fluoroscopic Guidance    PRE-OP DIAGNOSIS: Lumbar radiculopathy [M54.16] Lumbar radiculopathy [M54.16]    POST-OP DIAGNOSIS: Same    PHYSICIAN: Toni Osorio MD    ASSISTANTS: Brett Wester, DO [facility] Resident      MEDICATIONS INJECTED: Preservative-free Decadron 10mg with 5cc of Lidocaine 1% MPF     LOCAL ANESTHETIC INJECTED: Xylocaine 2%     SEDATION: Versed 2mg and Fentanyl 25mcg                                                                                                                                                                                     Conscious sedation ordered by M.D. Patient re-evaluation prior to administration of conscious sedation. No changes noted in patient's status from initial evaluation. The patient's vital signs were monitored by RN and patient remained hemodynamically stable throughout the procedure.    Event Time In   Sedation Start 1116   Sedation End 1126       ESTIMATED BLOOD LOSS: None    COMPLICATIONS: None    TECHNIQUE: Time-out was performed to identify the patient and procedure to be performed. With the patient laying in a prone position, the surgical area was prepped and draped in the usual sterile fashion using ChloraPrep and a fenestrated drape.The levels were determined under fluoroscopy guidance. Skin anesthesia was achieved by injecting Lidocaine 2% over the injection sites. The transforaminal spaces were then  approached with a 22 gauge, 3.5 inch spinal quinke needle that was introduced under fluoroscopic guidance in the AP and Lateral views. Once the needle tip was in the area of the transforaminal space, and there was no blood, CSF or paraesthesias, contrast dye Omnipaque (300mg/mL) was injected to confirm placement and there was no vascular runoff. Fluoroscopic imaging in the AP and lateral views revealed a clear outline of the spinal nerve with proximal spread of agent through the neural foramen into the epidural space. 3 mL of the medication mixture listed above was injected slowly at each site. Displacement of the radio opaque contrast after injection of the medication confirmed that the medication went into the area of the transforaminal spaces. The needles were removed and bleeding was nil. A sterile dressing was applied. No specimens collected. The patient tolerated the procedure well.   PAIN BEFORE THE PROCEDURE: 6/10    PAIN AFTER THE PROCEDURE: 5/10     The patient was monitored after the procedure in the recovery area. They were given post-procedure and discharge instructions to follow at home. The patient was discharged in a stable condition.        Toni Osorio MD

## 2022-12-21 NOTE — DISCHARGE SUMMARY
Discharge Note  Short Stay      SUMMARY     Admit Date: 12/21/2022    Attending Physician: Toni Osorio      Discharge Physician: Toni Osorio      Discharge Date: 12/21/2022 11:16 AM    Procedure(s) (LRB):  Injection,steroid,epidural, Todd L5/S1 TF CONTRAST DIRECT REFERRAL (Bilateral)    Final Diagnosis: Lumbar radiculopathy [M54.16]    Disposition: Home or self care    Patient Instructions:   Current Discharge Medication List        CONTINUE these medications which have NOT CHANGED    Details   acetaminophen (TYLENOL) 650 MG TbSR Take 1,300 mg by mouth 2 (two) times a day.      ascorbic acid, vitamin C, (VITAMIN C) 1000 MG tablet Take 1,000 mg by mouth once daily.      atorvastatin (LIPITOR) 40 MG tablet Take 40 mg by mouth once daily.      biotin 10,000 mcg TbDL Take 1 tablet by mouth once daily.       calcium-vitamin D3 (OS-KASSANDRA 500 + D3) 500 mg-5 mcg (200 unit) per tablet Take 1 tablet by mouth once daily.      conjugated estrogens (PREMARIN) vaginal cream INSERT 1/2 GRAM VAGINALLY  TWICE WEEKLY  Qty: 30 g, Refills: 3    Comments: Requesting 1 year supply  Associated Diagnoses: Vaginal atrophy      cranberry extract 200 mg Cap Take 1 capsule by mouth once daily.      donepeziL (ARICEPT) 10 MG tablet Take 1 tablet (10 mg total) by mouth every evening.  Qty: 90 tablet, Refills: 3      DULoxetine (CYMBALTA) 30 MG capsule TAKE 1 CAPSULE BY MOUTH  TWICE DAILY  Qty: 180 capsule, Refills: 1    Comments: Requesting 1 year supply      ferrous sulfate 324 mg (65 mg iron) TbEC Take 1 tablet (324 mg total) by mouth once daily.  Refills: 0      flash glucose scanning reader (FREESTYLE EFREN 14 DAY READER) Misc Check blood glucose level 3 times daily.  Qty: 1 each, Refills: 0    Associated Diagnoses: Type 2 diabetes mellitus without complication, without long-term current use of insulin      flash glucose sensor (FREESTYLE EFREN 14 DAY SENSOR) Kit 1 application by Misc.(Non-Drug; Combo Route) route every 14 (fourteen) days. Disp  30 or 90 day refill  Qty: 6 kit, Refills: 3    Associated Diagnoses: Type 2 diabetes mellitus without complication, without long-term current use of insulin      HYDROcodone-acetaminophen (NORCO) 5-325 mg per tablet Take 1 tablet by mouth every 8 (eight) hours as needed for Pain.  Qty: 30 tablet, Refills: 0    Comments: Quantity prescribed more than 7 day supply? No      hydrocortisone 2.5 % cream Apply topically 2 (two) times daily as needed.  Qty: 1 each, Refills: 1      levothyroxine (SYNTHROID) 75 MCG tablet Take 75 mcg by mouth before breakfast.      LIDOcaine (LIDODERM) 5 % Apply 1 to 3 patches to back daily. Remove & Discard patches within 12 hours.  Qty: 30 patch, Refills: 0    Associated Diagnoses: Back pain, unspecified back location, unspecified back pain laterality, unspecified chronicity      magnesium glycinate 100 mg Tab Take 500 mg by mouth once daily at 6am.      metFORMIN (GLUCOPHAGE) 500 MG tablet TAKE 1 TABLET(500 MG) BY MOUTH DAILY WITH BREAKFAST  Qty: 90 tablet, Refills: 1    Comments: ZERO refills remain on this prescription. Your patient is requesting advance approval of refills for this medication to PREVENT ANY MISSED DOSES      montelukast (SINGULAIR) 10 mg tablet TAKE 1 TABLET BY MOUTH  DAILY  Qty: 90 tablet, Refills: 3    Comments: Requesting 1 year supply      ondansetron (ZOFRAN-ODT) 8 MG TbDL Take 1 tablet (8 mg total) by mouth every 8 (eight) hours as needed (nausea).  Qty: 30 tablet, Refills: 0      pantoprazole (PROTONIX) 20 MG tablet Take 1 tablet (20 mg total) by mouth once daily.  Qty: 90 tablet, Refills: 2      triamcinolone acetonide 0.1% (KENALOG) 0.1 % paste APPLY TO THE AFFECTED AREA WITH EVERY-TIP THREE TIMES DAILY      vitamin D 1000 units Tab Take 1,000 Units by mouth once daily. D3                 Discharge Diagnosis: Lumbar radiculopathy [M54.16]  Condition on Discharge: Stable with no complications to procedure   Diet on Discharge: Same as before.  Activity: as per  instruction sheet.  Discharge to: Home with a responsible adult.  Follow up: 2-4 weeks       Please call my office or pager at [phone] if experienced any weakness or loss of sensation, fever > 101.5, pain uncontrolled with oral medications, persistent nausea/vomiting/or diarrhea, redness or drainage from the incisions, or any other worrisome concerns. If physician on call was not reached or could not communicate with our office for any reason please go to the nearest emergency department

## 2022-12-21 NOTE — H&P
HPI  Injection,steroid,epidural, Todd L5/S1 TF CONTRAST           Past Medical History:   Diagnosis Date    Abdominal pain     Allergic rhinitis     Arthritis     Blood platelet disorder     Blood platelet disorder     Blood transfusion     during delivery and     Bowel obstruction     Cervical radiculopathy     followed by dr cloud    Colon cancer     transverse colon; resected; Stage IIA (pT3 pN0 MX)    Degenerative joint disease (DJD) of lumbar spine     Diabetes mellitus     Diarrhea     Falls 2020    Family history of breast cancer     Family history of colon cancer     Fatty liver     GERD (gastroesophageal reflux disease)     History of shingles     Hyperlipidemia     Hypomagnesemia     Hypothyroidism     Irritable bowel syndrome     Microscopic colitis     treated     Migraine     Myelodysplastic syndrome     Raynaud phenomenon     Raynaud's disease     Squamous cell carcinoma of skin     Type 2 diabetes mellitus      Past Surgical History:   Procedure Laterality Date    ADENOIDECTOMY      APPENDECTOMY      BACK SURGERY      CARPAL TUNNEL RELEASE      bilateral      SECTION      CHOLECYSTECTOMY  1965    COLECTOMY  2011    Transverse colon resection by Dr. Aguirre    COLONOSCOPY N/A 2017    Procedure: COLONOSCOPY;  Surgeon: Manjti Alvarez MD;  Location: [facility] (4TH FLR);  Service: Endoscopy;  Laterality: N/A;    COLONOSCOPY N/A 2019    Procedure: COLONOSCOPY;  Surgeon: Ramiro Jefferson MD;  Location: [facility] (4TH FLR);  Service: Endoscopy;  Laterality: N/A;  PM prep    COLONOSCOPY N/A 2022    Procedure: COLONOSCOPY;  Surgeon: Ramiro Jefferson MD;  Location: [facility] (2ND FLR);  Service: Endoscopy;  Laterality: N/A;  EGD and colonoscopy in 6-8 weeks from now     states has constipation. -extended Golytely prep    CYSTOSCOPY N/A 2021    Procedure: CYSTOSCOPY;  Surgeon: Viridiana Valenzuela MD;  [address];  Service: Urology;  Laterality:  N/A;    ENDOSCOPIC ULTRASOUND OF UPPER GASTROINTESTINAL TRACT N/A 12/12/2018    Procedure: ULTRASOUND, UPPER GI TRACT, ENDOSCOPIC;  Surgeon: Jose Hess MD;  Location: [facility];  Service: Endoscopy;  Laterality: N/A;    ENDOSCOPIC ULTRASOUND OF UPPER GASTROINTESTINAL TRACT N/A 01/23/2019    Procedure: ULTRASOUND, UPPER GI TRACT, ENDOSCOPIC;  Surgeon: Jose Hess MD;  Location: [facility] (2ND FLR);  Service: Endoscopy;  Laterality: N/A;    ESOPHAGOGASTRODUODENOSCOPY N/A 11/16/2018    Procedure: EGD (ESOPHAGOGASTRODUODENOSCOPY);  Surgeon: Angelo Reynolds MD;  Location: [facility] (2ND FLR);  Service: Endoscopy;  Laterality: N/A;    ESOPHAGOGASTRODUODENOSCOPY N/A 06/26/2019    Procedure: EGD (ESOPHAGOGASTRODUODENOSCOPY);  Surgeon: Ramiro Jefferson MD;  Location: [facility] (4TH FLR);  Service: Endoscopy;  Laterality: N/A;    ESOPHAGOGASTRODUODENOSCOPY N/A 5/4/2022    Procedure: EGD (ESOPHAGOGASTRODUODENOSCOPY);  Surgeon: Ramiro Jefferson MD;  Location: [facility] (2ND FLR);  Service: Endoscopy;  Laterality: N/A;  fully vaccinated-GT    EYE SURGERY      Cataract Removal    FLUOROSCOPIC URODYNAMIC STUDY N/A 11/09/2021    Procedure: URODYNAMIC STUDY, FLUOROSCOPIC;  Surgeon: Viridiana Valenzuela MD;  Location: [facility] OR 1ST FLR;  Service: Urology;  Laterality: N/A;  90 minutes     HYSTERECTOMY      JOINT REPLACEMENT      posterolateral fusion with autograft bone and [place] mineralized bone matrix  02/01/2013    at [facility] for lumbar spine stenosis    SMALL INTESTINE SURGERY      SPINE SURGERY      TOE SURGERY      TONSILLECTOMY      TRIGGER FINGER RELEASE       Review of patient's allergies indicates:   Allergen Reactions    Codeine Itching and Nausea And Vomiting    Dilaudid [hydromorphone (bulk)] Other (See Comments)     Oversedating, head burning. Pt prefers to avoid.       Percocet [oxycodone-acetaminophen] Itching    Sulfa (sulfonamide antibiotics) Itching and Nausea And Vomiting           Latex, natural  rubber Rash        PMHx, PSHx, Allergies, Medications reviewed in epic      ROS negative except pain complaints in HPI    OBJECTIVE:    LMP  (LMP Unknown)     PHYSICAL EXAMINATION:    GENERAL: Well appearing, in no acute distress, alert and oriented x3.  PSYCH:  Mood and affect appropriate.  SKIN: Skin color, texture, turgor normal, no rashes or lesions.  CV: RRR with palpation of the radial artery.  PULM: No evidence of respiratory difficulty, symmetric chest rise. Clear to auscultation.  NEURO: Cranial nerves grossly intact.    Plan:    Proceed with procedure as planned    Bobby Coffey  12/21/2022

## 2022-12-21 NOTE — DISCHARGE INSTRUCTIONS

## 2022-12-22 ENCOUNTER — OUTPATIENT CASE MANAGEMENT (OUTPATIENT)
Dept: ADMINISTRATIVE | Facility: OTHER | Age: 77
End: 2022-12-22
Payer: MEDICARE

## 2022-12-22 ENCOUNTER — PATIENT MESSAGE (OUTPATIENT)
Dept: ADMINISTRATIVE | Facility: OTHER | Age: 77
End: 2022-12-22
Payer: MEDICARE

## 2022-12-22 NOTE — PROGRESS NOTES
Outpatient Care Management  Initial Patient Assessment    Patient: Anayeli Judd  MRN: [id number]  Date of Service: 12/22/2022  Completed by: Mary Brandon RN  Referral Date: 11/30/2022  Program: High Risk  Status: Ongoing  Effective Dates: 12/22/2022 - present  Responsible Staff: Mary Brandon RN        Reason for Visit   Patient presents with    OPCM Enrollment Call     12/22/22      OPCM Chart Review     12/22/22    Initial Assessment    PHQ-9    Plan Of Care       Brief Summary:  Anayeli Judd was referred by Dr. Dougherty for periumbilical abdominal pain . Patient qualifies for program based on risk score of 69.4%.   Active problem list, medical, surgical and social history reviewed. Active comorbidities include abdominal pain, hx of bowel obstruction, incontinence. Areas of need identified by patient include management of bowel obstruction.   Complex care plan created with patient/caregiver input. By next encounter, patient agrees to discuss management of bowel obstruction.   Next steps: Review [facility] educational materials   Discuss management of small bowel obstruction.

## 2023-01-05 ENCOUNTER — OFFICE VISIT (OUTPATIENT)
Dept: INTERNAL MEDICINE | Facility: CLINIC | Age: 78
End: 2023-01-05
Payer: MEDICARE

## 2023-01-05 VITALS
HEART RATE: 69 BPM | SYSTOLIC BLOOD PRESSURE: 107 MMHG | DIASTOLIC BLOOD PRESSURE: 70 MMHG | OXYGEN SATURATION: 99 % | TEMPERATURE: 98 F

## 2023-01-05 DIAGNOSIS — M54.9 BACK PAIN, UNSPECIFIED BACK LOCATION, UNSPECIFIED BACK PAIN LATERALITY, UNSPECIFIED CHRONICITY: ICD-10-CM

## 2023-01-05 DIAGNOSIS — R51.9 BILATERAL HEADACHES: ICD-10-CM

## 2023-01-05 DIAGNOSIS — L03.90 CELLULITIS, UNSPECIFIED CELLULITIS SITE: Primary | ICD-10-CM

## 2023-01-05 DIAGNOSIS — R05.9 COUGH, UNSPECIFIED TYPE: ICD-10-CM

## 2023-01-05 LAB
CTP QC/QA: YES
CTP QC/QA: YES
POC MOLECULAR INFLUENZA A AGN: NEGATIVE
POC MOLECULAR INFLUENZA B AGN: NEGATIVE
SARS-COV-2 RDRP RESP QL NAA+PROBE: NEGATIVE

## 2023-01-05 PROCEDURE — 87635 SARS-COV-2 COVID-19 AMP PRB: CPT | Mod: PBBFAC | Performed by: INTERNAL MEDICINE

## 2023-01-05 PROCEDURE — 99213 PR OFFICE/OUTPT VISIT, EST, LEVL III, 20-29 MIN: ICD-10-PCS | Mod: S$PBB,,, | Performed by: INTERNAL MEDICINE

## 2023-01-05 PROCEDURE — 99999 PR PBB SHADOW E&M-EST. PATIENT-LVL II: ICD-10-PCS | Mod: PBBFAC,,, | Performed by: INTERNAL MEDICINE

## 2023-01-05 PROCEDURE — 87502 INFLUENZA DNA AMP PROBE: CPT | Mod: PBBFAC | Performed by: INTERNAL MEDICINE

## 2023-01-05 PROCEDURE — 99213 OFFICE O/P EST LOW 20 MIN: CPT | Mod: S$PBB,,, | Performed by: INTERNAL MEDICINE

## 2023-01-05 PROCEDURE — 99999 PR PBB SHADOW E&M-EST. PATIENT-LVL II: CPT | Mod: PBBFAC,,, | Performed by: INTERNAL MEDICINE

## 2023-01-05 PROCEDURE — 99212 OFFICE O/P EST SF 10 MIN: CPT | Mod: PBBFAC | Performed by: INTERNAL MEDICINE

## 2023-01-05 RX ORDER — LIDOCAINE 50 MG/G
PATCH TOPICAL
Qty: 90 PATCH | Refills: 5 | Status: SHIPPED | OUTPATIENT
Start: 2023-01-05 | End: 2023-09-11

## 2023-01-05 RX ORDER — DOXYCYCLINE HYCLATE 100 MG
100 TABLET ORAL 2 TIMES DAILY
Qty: 14 TABLET | Refills: 0 | Status: SHIPPED | OUTPATIENT
Start: 2023-01-05 | End: 2023-01-12

## 2023-01-06 NOTE — PROGRESS NOTES
Subjective:       Patient ID: Anayeli Judd is a 77 y.o. female.    Chief Complaint: Skin infection    HPI:  Left dorsal forearm SCC removal on 22. Had healed well but now focal area of pain, swelling and surrounding erythema at site recently.  Lumbar DJD, recent steroid injection has not been helpful per pt. She would like to continue 3 lidocaine patches per day. Hopes to avoid sedating meds such as gabapentin which she took previously. Surgery is not something she would want and would be high risk for complications after with her history of abdominal issues.  Notes headaches and sinus pressure. Notes aches are towards b/l temples typically. She is requesting MRI brain. No recent trauma. Last MRI relatively normal Oct 2021.  Mild cough and upper respiratory symptoms recently.      Past Medical History:   Diagnosis Date    Abdominal pain     Allergic rhinitis     Arthritis     Blood platelet disorder     Blood platelet disorder     Blood transfusion     during delivery and     Bowel obstruction     Cervical radiculopathy     followed by dr cloud    Colon cancer     transverse colon; resected; Stage IIA (pT3 pN0 MX)    Degenerative joint disease (DJD) of lumbar spine     Diabetes mellitus     Diarrhea     Falls 2020    Family history of breast cancer     Family history of colon cancer     Fatty liver     GERD (gastroesophageal reflux disease)     History of shingles     Hyperlipidemia     Hypomagnesemia     Hypothyroidism     Irritable bowel syndrome     Microscopic colitis     treated     Migraine     Myelodysplastic syndrome     Raynaud phenomenon     Raynaud's disease     Squamous cell carcinoma of skin     Type 2 diabetes mellitus          Current Outpatient Medications:     acetaminophen (TYLENOL) 650 MG TbSR, Take 1,300 mg by mouth 2 (two) times a day., Disp: , Rfl:     ascorbic acid, vitamin C, (VITAMIN C) 1000 MG tablet, Take 1,000 mg by mouth once daily., Disp: , Rfl:      atorvastatin (LIPITOR) 40 MG tablet, Take 40 mg by mouth once daily., Disp: , Rfl:     biotin 10,000 mcg TbDL, Take 1 tablet by mouth once daily. , Disp: , Rfl:     calcium-vitamin D3 (OS-KASSANDRA 500 + D3) 500 mg-5 mcg (200 unit) per tablet, Take 1 tablet by mouth once daily., Disp: , Rfl:     conjugated estrogens (PREMARIN) vaginal cream, INSERT 1/2 GRAM VAGINALLY  TWICE WEEKLY, Disp: 30 g, Rfl: 3    cranberry extract 200 mg Cap, Take 1 capsule by mouth once daily., Disp: , Rfl:     donepeziL (ARICEPT) 10 MG tablet, Take 1 tablet (10 mg total) by mouth every evening., Disp: 90 tablet, Rfl: 3    doxycycline (VIBRA-TABS) 100 MG tablet, Take 1 tablet (100 mg total) by mouth 2 (two) times daily. for 7 days, Disp: 14 tablet, Rfl: 0    DULoxetine (CYMBALTA) 30 MG capsule, TAKE 1 CAPSULE BY MOUTH  TWICE DAILY (Patient taking differently: Take 30 mg by mouth 2 (two) times daily.), Disp: 180 capsule, Rfl: 1    ferrous sulfate 324 mg (65 mg iron) TbEC, Take 1 tablet (324 mg total) by mouth once daily., Disp: , Rfl: 0    flash glucose scanning reader (FREESTYLE EFREN 14 DAY READER) Misc, Check blood glucose level 3 times daily., Disp: 1 each, Rfl: 0    flash glucose sensor (FREESTYLE EFREN 14 DAY SENSOR) Kit, 1 application by Misc.(Non-Drug; Combo Route) route every 14 (fourteen) days. Disp 30 or 90 day refill, Disp: 6 kit, Rfl: 3    HYDROcodone-acetaminophen (NORCO) 5-325 mg per tablet, Take 1 tablet by mouth every 8 (eight) hours as needed for Pain. (Patient not taking: Reported on 12/8/2022), Disp: 30 tablet, Rfl: 0    hydrocortisone 2.5 % cream, Apply topically 2 (two) times daily as needed., Disp: 1 each, Rfl: 1    levothyroxine (SYNTHROID) 75 MCG tablet, Take 75 mcg by mouth before breakfast., Disp: , Rfl:     LIDOcaine (LIDODERM) 5 %, Apply 1 to 3 patches to back daily. Remove & Discard patches within 12 hours., Disp: 30 patch, Rfl: 0    magnesium glycinate 100 mg Tab, Take 500 mg by mouth once daily at 6am., Disp: , Rfl:      metFORMIN (GLUCOPHAGE) 500 MG tablet, TAKE 1 TABLET(500 MG) BY MOUTH DAILY WITH BREAKFAST, Disp: 90 tablet, Rfl: 1    montelukast (SINGULAIR) 10 mg tablet, TAKE 1 TABLET BY MOUTH  DAILY, Disp: 90 tablet, Rfl: 3    ondansetron (ZOFRAN-ODT) 8 MG TbDL, Take 1 tablet (8 mg total) by mouth every 8 (eight) hours as needed (nausea)., Disp: 30 tablet, Rfl: 0    pantoprazole (PROTONIX) 20 MG tablet, Take 1 tablet (20 mg total) by mouth once daily., Disp: 90 tablet, Rfl: 2    triamcinolone acetonide 0.1% (KENALOG) 0.1 % paste, APPLY TO THE AFFECTED AREA WITH EVERY-TIP THREE TIMES DAILY, Disp: , Rfl:     vitamin D 1000 units Tab, Take 1,000 Units by mouth once daily. D3, Disp: , Rfl:     Past Surgical History:   Procedure Laterality Date    ADENOIDECTOMY      APPENDECTOMY      BACK SURGERY      CARPAL TUNNEL RELEASE      bilateral      SECTION      CHOLECYSTECTOMY  1965    COLECTOMY  2011    Transverse colon resection by Dr. Aguirre    COLONOSCOPY N/A 2017    Procedure: COLONOSCOPY;  Surgeon: Manjit Alvarez MD;  Location: [facility] (4TH FLR);  Service: Endoscopy;  Laterality: N/A;    COLONOSCOPY N/A 2019    Procedure: COLONOSCOPY;  Surgeon: Ramiro Jefferson MD;  Location: [facility] (4TH FLR);  Service: Endoscopy;  Laterality: N/A;  PM prep    COLONOSCOPY N/A 2022    Procedure: COLONOSCOPY;  Surgeon: Ramiro Jefferson MD;  Location: [facility] (2ND FLR);  Service: Endoscopy;  Laterality: N/A;  EGD and colonoscopy in 6-8 weeks from now     states has constipation. -extended Golytely prep    CYSTOSCOPY N/A 2021    Procedure: CYSTOSCOPY;  Surgeon: Viridiana Valenzuela MD;  [address]  Service: Urology;  Laterality: N/A;    ENDOSCOPIC ULTRASOUND OF UPPER GASTROINTESTINAL TRACT N/A 2018    Procedure: ULTRASOUND, UPPER GI TRACT, ENDOSCOPIC;  Surgeon: Jose Hess MD;  Location: [facility];  Service: Endoscopy;  Laterality: N/A;    ENDOSCOPIC ULTRASOUND OF UPPER  GASTROINTESTINAL TRACT N/A 01/23/2019    Procedure: ULTRASOUND, UPPER GI TRACT, ENDOSCOPIC;  Surgeon: Jose Hess MD;  Location: [facility] ENDO (2ND FLR);  Service: Endoscopy;  Laterality: N/A;    ESOPHAGOGASTRODUODENOSCOPY N/A 11/16/2018    Procedure: EGD (ESOPHAGOGASTRODUODENOSCOPY);  Surgeon: Angelo Reynolds MD;  Location: [facility] ENDO (2ND FLR);  Service: Endoscopy;  Laterality: N/A;    ESOPHAGOGASTRODUODENOSCOPY N/A 06/26/2019    Procedure: EGD (ESOPHAGOGASTRODUODENOSCOPY);  Surgeon: Ramiro Jefferson MD;  Location: [facility] ENDO (4TH FLR);  Service: Endoscopy;  Laterality: N/A;    ESOPHAGOGASTRODUODENOSCOPY N/A 5/4/2022    Procedure: EGD (ESOPHAGOGASTRODUODENOSCOPY);  Surgeon: Ramiro Jefferson MD;  Location: [facility] ENDO (2ND FLR);  Service: Endoscopy;  Laterality: N/A;  fully vaccinated-GT    EYE SURGERY      Cataract Removal    FLUOROSCOPIC URODYNAMIC STUDY N/A 11/09/2021    Procedure: URODYNAMIC STUDY, FLUOROSCOPIC;  Surgeon: Viridiana Valenzuela MD;  Location: [facility] OR 1ST FLR;  Service: Urology;  Laterality: N/A;  90 minutes     HYSTERECTOMY      JOINT REPLACEMENT      posterolateral fusion with autograft bone and [place] mineralized bone matrix  02/01/2013    at [facility] for lumbar spine stenosis    SMALL INTESTINE SURGERY      SPINE SURGERY      TOE SURGERY      TONSILLECTOMY      TRANSFORAMINAL EPIDURAL INJECTION OF STEROID Bilateral 12/21/2022    Procedure: Injection,steroid,epidural, Todd L5/S1 TF CONTRAST DIRECT REFERRAL;  Surgeon: Toni Osorio MD;  Location: [facility] PAIN MGT;  Service: Pain Management;  Laterality: Bilateral;    TRIGGER FINGER RELEASE         Social History     Tobacco Use    Smoking status: Never    Smokeless tobacco: Never   Substance Use Topics    Alcohol use: Not Currently     Comment: socially, rarely    Drug use: Never         Objective:      Vitals:    01/05/23 1602   BP: 107/70   Pulse: 69   Temp: 98 °F (36.7 °C)       Physical Exam  Constitutional:       General: She is not in acute  distress.     Appearance: Normal appearance. She is not ill-appearing, toxic-appearing or diaphoretic.   HENT:      Head: Normocephalic and atraumatic.   Eyes:      Extraocular Movements: Extraocular movements intact.      Conjunctiva/sclera: Conjunctivae normal.   Cardiovascular:      Rate and Rhythm: Normal rate.   Pulmonary:      Effort: Pulmonary effort is normal. No respiratory distress.      Breath sounds: Normal breath sounds.   Skin:     Coloration: Skin is not pale.      Findings: Erythema and lesion present. No bruising.      Comments: Site of previous left dorsal forearm SCC excision has focal area of prominence with surrounding erythema and tenderness. Focal prominence with some evidence of underlying small hematoma and whitish appearance but not a well circumscribed deep abscess at this time and relatively superficial on palpation.   Neurological:      General: No focal deficit present.      Mental Status: She is alert and oriented to person, place, and time.      Cranial Nerves: No cranial nerve deficit.   Psychiatric:         Mood and Affect: Mood normal.         Behavior: Behavior normal.         Thought Content: Thought content normal.         Judgment: Judgment normal.         Assessment/Plan:     1) Cellulitis, Wound infection  - Site of previous SCC excision on left dorsal forearm. Mild prominence centrally at previous excision site but relatively superficial. No open wounds. Will start course of Doxycycline. If lack or improvement or persistence, will get her in with Derm or Wound Care for possible I&D. Suspect abx will be effective and if can avoid opening area again, this would be ideal.    ADDENDUM, 1/10/23: Pt stopped by office for wound check. Much improved with no residual nodularity. Some very mild erythema still but no ttp now. Continuing 2 more days for total 7 day course of Doxycycline.    2) Lumbar DJD - Not much effect thus far with recent steroid injection. Continuing Lidoderm  patches (1 to 3 per day for 12 hrs). She would like to avoid sedating meds. Using Tylenol prn. F/U with Pain Mgmt. If going back on pain meds in future, I will likely suggest low dose Lyrica with titration.    3) Headaches, chronic  - Bilateral temples typically but also sinus related at times. Pt requesting MRI brain. Not unreasonable since more frequent recently, will order.    4) Respiratory infection  - Flu and COVID negative in clinic today. Continue symptom control with otc meds. Doxycycline would cover most potential respiratory bacteria as well.

## 2023-01-09 ENCOUNTER — PATIENT MESSAGE (OUTPATIENT)
Dept: INTERNAL MEDICINE | Facility: CLINIC | Age: 78
End: 2023-01-09
Payer: MEDICARE

## 2023-01-10 ENCOUNTER — CLINICAL SUPPORT (OUTPATIENT)
Dept: AUDIOLOGY | Facility: CLINIC | Age: 78
End: 2023-01-10
Payer: MEDICARE

## 2023-01-10 DIAGNOSIS — H90.3 SENSORINEURAL HEARING LOSS (SNHL), BILATERAL: Primary | ICD-10-CM

## 2023-01-10 PROCEDURE — 99999 PR PBB SHADOW E&M-EST. PATIENT-LVL I: CPT | Mod: PBBFAC,,,

## 2023-01-10 PROCEDURE — 99999 PR PBB SHADOW E&M-EST. PATIENT-LVL I: ICD-10-PCS | Mod: PBBFAC,,,

## 2023-01-10 PROCEDURE — 99211 OFF/OP EST MAY X REQ PHY/QHP: CPT | Mod: PBBFAC

## 2023-01-10 NOTE — PROGRESS NOTES
HEARING AID FITTING    Anayeli Judd was seen today for a hearing aid fitting.  She was fit with two Oticon MORE 1 miniRITE R hearing aids with 2, 85 speakers and RITE mold hard miniFit on each aid.  The hearing aids were connected to the software via Buddy link wireless.  The feedback test was run for both aids.  The patient reported hearing an echo and background noise was too loud.  The low frequency gain was decreased in both aids.  The overall gain was also decreased in both aids.  The patient was counseled that she will hear her voice more than she is used to, but to give it some time to get acclimated.  Battery charging, how to change the volume, and how to turn the hearing aid on and off were reviewed with the patient.  Counseled patient on daily wear and maintenance of the hearing aid.  Ms. Judd's old hearing aids were deleted from her iphone and the new Oticon hearing aids were connected to the patient's iphone and Oticon kianna.  Ms. Judd was advised that we can reconnect her old hearing aids to her iphone if we ever need to send her new aids off for repair.  The purchase agreement, 30-day trial period, and warranties were also reviewed with the patient.  I will call Ms. Judd in two weeks to check in and see how she is doing with her hearing aids and to schedule an appointment if needed.    Recommendations:  Hearing protection in noise  Annual hearing aid follow-up or sooner if needed  Annual audiogram or sooner if needed      Hearing Aid Details     & Model: Oticon MORE 1 miniRITE R   Color: Terracotta   Rt SN: [id number]   Lt SN: [id number]   Battery: LI-ION   Rt  and Dome: 2,85, RITE Mold Hard miniFiT  Lt  and Dome: 2,85, RITE Mold Hard miniFiT  Repair Warranty Exp: 1/18/2026   L&D Warranty Exp: 1/18/2026     RITE Mold Hard miniFit   Transparent Cast Vent   RT SN: [id number]   LT SN: [id number]   Warranty Exp: 4/03/2023      SN: [id number]   Warranty Exp: 01/18/2026

## 2023-01-18 ENCOUNTER — OUTPATIENT CASE MANAGEMENT (OUTPATIENT)
Dept: ADMINISTRATIVE | Facility: OTHER | Age: 78
End: 2023-01-18

## 2023-01-19 ENCOUNTER — PATIENT MESSAGE (OUTPATIENT)
Dept: INTERNAL MEDICINE | Facility: CLINIC | Age: 78
End: 2023-01-19
Payer: MEDICARE

## 2023-01-21 ENCOUNTER — HOSPITAL ENCOUNTER (OUTPATIENT)
Dept: RADIOLOGY | Facility: OTHER | Age: 78
Discharge: HOME | End: 2023-01-21
Attending: INTERNAL MEDICINE
Payer: MEDICARE

## 2023-01-21 DIAGNOSIS — R51.9 BILATERAL HEADACHES: ICD-10-CM

## 2023-01-21 PROCEDURE — 70551 MRI BRAIN STEM W/O DYE: CPT | Mod: 26,,, | Performed by: RADIOLOGY

## 2023-01-21 PROCEDURE — 70551 MRI BRAIN WITHOUT CONTRAST: ICD-10-PCS | Mod: 26,,, | Performed by: RADIOLOGY

## 2023-01-21 PROCEDURE — 70551 MRI BRAIN STEM W/O DYE: CPT | Mod: TC

## 2023-01-22 NOTE — PROGRESS NOTES
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 Judd is a 78 y.o. female who is referred to us for thrombocytopenia and bone marrow biopsy suggestive of MDS.   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 She remained in remission since then. She also has history of multiple SBO s/p ex lap x2, and SBR.   She was noted to have thrombocytopenia on routine labs, and bone marrow biopsy was performed on 08/03/2020 revealing "mildly hypercellular marrow (30-40%) with trilineage hematopoiesis and mild dyserythropoiesis with increased ring sideroblasts (6%), Blasts in marrow were 0.7%  Chromosomal analysis revealed normal karyotype 46,XX with no evidence of acquired clonal abnormality.   Negative FISH results for -5/5q-, -7/7q-, +8, and -20/20q-    NGS reported normal.   She was seen on initial consult visit on 09/08/2020, and giving her  R-IPSS score was 1.5 and risk category of very low. She is placed under surveillance every 3 moths.     She had recurrent hospital admissions for SBO (more than 5 times). She underwent an exploratory laparotomy on 03/04/2022 and the entire abdomen seemed to be locked in.  It was deemed unsafe to proceed with the surgery.   She also saw neurology for mild cognitive impairment and was started on donepezil.    Interval history:    She returns today with her daughter. She feels fatigued. She has headaches and sinus problem. She has not had recent hospitalizations. She has not had any falls recently. Noted to have black stools which was attributed to iron supp.       Past Medical History:   Past Medical History:   Diagnosis Date    Abdominal pain     Allergic rhinitis     Arthritis     Blood platelet disorder     Blood platelet disorder     Blood " transfusion     during delivery and     Bowel obstruction     Cervical radiculopathy     followed by dr cloud    Colon cancer     transverse colon; resected; Stage IIA (pT3 pN0 MX)    Degenerative joint disease (DJD) of lumbar spine     Diabetes mellitus     Diarrhea     Falls 2020    Family history of breast cancer     Family history of colon cancer     Fatty liver     GERD (gastroesophageal reflux disease)     History of shingles     Hyperlipidemia     Hypomagnesemia     Hypothyroidism     Irritable bowel syndrome     Microscopic colitis     treated     Migraine     Myelodysplastic syndrome     Raynaud phenomenon     Raynaud's disease     Squamous cell carcinoma of skin     Type 2 diabetes mellitus        Past Surgical HIstory:   Past Surgical History:   Procedure Laterality Date    ADENOIDECTOMY      APPENDECTOMY      BACK SURGERY      CARPAL TUNNEL RELEASE      bilateral      SECTION      CHOLECYSTECTOMY  1965    COLECTOMY  2011    Transverse colon resection by Dr. Aguirre    COLONOSCOPY N/A 2017    Procedure: COLONOSCOPY;  Surgeon: Manjit Alvarez MD;  Location: [facility] (4TH FLR);  Service: Endoscopy;  Laterality: N/A;    COLONOSCOPY N/A 2019    Procedure: COLONOSCOPY;  Surgeon: Ramiro Jefferson MD;  Location: [facility] (4TH FLR);  Service: Endoscopy;  Laterality: N/A;  PM prep    COLONOSCOPY N/A 2022    Procedure: COLONOSCOPY;  Surgeon: Ramiro Jefferson MD;  Location: [facility] (2ND FLR);  Service: Endoscopy;  Laterality: N/A;  EGD and colonoscopy in 6-8 weeks from now     states has constipation. -extended Golytely prep    CYSTOSCOPY N/A 2021    Procedure: CYSTOSCOPY;  Surgeon: Viridiana Valenzuela MD;  Location: [facility] OR 1ST FLR;  Service: Urology;  Laterality: N/A;    ENDOSCOPIC ULTRASOUND OF UPPER GASTROINTESTINAL TRACT N/A 2018    Procedure: ULTRASOUND, UPPER GI TRACT, ENDOSCOPIC;  Surgeon: Jose Hess MD;  Location: [facility];  Service:  Endoscopy;  Laterality: N/A;    ENDOSCOPIC ULTRASOUND OF UPPER GASTROINTESTINAL TRACT N/A 01/23/2019    Procedure: ULTRASOUND, UPPER GI TRACT, ENDOSCOPIC;  Surgeon: Jose Hess MD;  Location: [facility] (2ND FLR);  Service: Endoscopy;  Laterality: N/A;    ESOPHAGOGASTRODUODENOSCOPY N/A 11/16/2018    Procedure: EGD (ESOPHAGOGASTRODUODENOSCOPY);  Surgeon: Angelo Reynolds MD;  Location: [facility] (2ND FLR);  Service: Endoscopy;  Laterality: N/A;    ESOPHAGOGASTRODUODENOSCOPY N/A 06/26/2019    Procedure: EGD (ESOPHAGOGASTRODUODENOSCOPY);  Surgeon: Ramiro Jefferson MD;  Location: [facility] ENDO (4TH FLR);  Service: Endoscopy;  Laterality: N/A;    ESOPHAGOGASTRODUODENOSCOPY N/A 5/4/2022    Procedure: EGD (ESOPHAGOGASTRODUODENOSCOPY);  Surgeon: Ramiro Jefferson MD;  Location: [facility] (2ND FLR);  Service: Endoscopy;  Laterality: N/A;  fully vaccinated-GT    EYE SURGERY      Cataract Removal    FLUOROSCOPIC URODYNAMIC STUDY N/A 11/09/2021    Procedure: URODYNAMIC STUDY, FLUOROSCOPIC;  Surgeon: Viridiana Valenzuela MD;  Location: [facility] OR 1ST FLR;  Service: Urology;  Laterality: N/A;  90 minutes     HYSTERECTOMY      JOINT REPLACEMENT      posterolateral fusion with autograft bone and [place] mineralized bone matrix  02/01/2013    at [facility] for lumbar spine stenosis    SMALL INTESTINE SURGERY      SPINE SURGERY      TOE SURGERY      TONSILLECTOMY      TRANSFORAMINAL EPIDURAL INJECTION OF STEROID Bilateral 12/21/2022    Procedure: Injection,steroid,epidural, Todd L5/S1 TF CONTRAST DIRECT REFERRAL;  Surgeon: Toni Osorio MD;  Location: [facility] PAIN MGT;  Service: Pain Management;  Laterality: Bilateral;    TRIGGER FINGER RELEASE         Family History:   Family History   Problem Relation Age of Onset    Heart disease Father 50        Mi age 50    Colon cancer Father     Bladder Cancer Mother         non smoker    Cataracts Mother     Glaucoma Mother     Heart disease Mother     Hyperlipidemia Mother     Kidney disease Mother      Breast cancer Sister 79    Arthritis Daughter     Asthma Daughter     Depression Daughter     Hypertension Daughter     Stroke Daughter 40    Arthritis Brother     Colon cancer Brother 70    Alcohol abuse Brother     Parkinsonism Brother     Alcohol abuse Brother     Depression Daughter     Stroke Daughter     Alcohol abuse Brother     Arthritis Brother     Asthma Daughter     Depression Daughter     Celiac disease Neg Hx     Cirrhosis Neg Hx     Colon polyps Neg Hx     Crohn's disease Neg Hx     Cystic fibrosis Neg Hx     Esophageal cancer Neg Hx     Hemochromatosis Neg Hx     Inflammatory bowel disease Neg Hx     Irritable bowel syndrome Neg Hx     Liver cancer Neg Hx     Liver disease Neg Hx     Rectal cancer Neg Hx     Stomach cancer Neg Hx     Ulcerative colitis Neg Hx     Eliazar's disease Neg Hx     Amblyopia Neg Hx     Blindness Neg Hx     Macular degeneration Neg Hx     Retinal detachment Neg Hx     Strabismus Neg Hx     Melanoma Neg Hx        Social History:  reports that she has never smoked. She has never used smokeless tobacco. She reports that she does not currently use alcohol. She reports that she does not use drugs.    Allergies:  Review of patient's allergies indicates:   Allergen Reactions    Codeine Itching and Nausea And Vomiting    Dilaudid [hydromorphone (bulk)] Other (See Comments)     Oversedating, head burning. Pt prefers to avoid.       Percocet [oxycodone-acetaminophen] Itching    Sulfa (sulfonamide antibiotics) Itching and Nausea And Vomiting           Latex, natural rubber Rash       Medications:  Current Outpatient Medications   Medication Sig Dispense Refill    acetaminophen (TYLENOL) 650 MG TbSR Take 1,300 mg by mouth 2 (two) times a day.      ascorbic acid, vitamin C, (VITAMIN C) 1000 MG tablet Take 1,000 mg by mouth once daily.      atorvastatin (LIPITOR) 40 MG tablet Take 40 mg by mouth once daily.      biotin 10,000 mcg TbDL Take 1 tablet by mouth once daily.       calcium-vitamin  D3 (OS-KASSANDRA 500 + D3) 500 mg-5 mcg (200 unit) per tablet Take 1 tablet by mouth once daily.      conjugated estrogens (PREMARIN) vaginal cream INSERT 1/2 GRAM VAGINALLY  TWICE WEEKLY 30 g 3    cranberry extract 200 mg Cap Take 1 capsule by mouth once daily.      donepeziL (ARICEPT) 10 MG tablet Take 1 tablet (10 mg total) by mouth every evening. 90 tablet 3    DULoxetine (CYMBALTA) 30 MG capsule TAKE 1 CAPSULE BY MOUTH  TWICE DAILY (Patient taking differently: Take 30 mg by mouth 2 (two) times daily.) 180 capsule 1    ferrous sulfate 324 mg (65 mg iron) TbEC Take 1 tablet (324 mg total) by mouth once daily.  0    flash glucose scanning reader (BetaVersitySTYLE EFREN 14 DAY READER) Misc Check blood glucose level 3 times daily. 1 each 0    flash glucose sensor (FREESTYLE EFREN 14 DAY SENSOR) Kit 1 application by Misc.(Non-Drug; Combo Route) route every 14 (fourteen) days. Disp 30 or 90 day refill 6 kit 3    hydrocortisone 2.5 % cream Apply topically 2 (two) times daily as needed. 1 each 1    levothyroxine (SYNTHROID) 75 MCG tablet Take 75 mcg by mouth before breakfast.      LIDOcaine (LIDODERM) 5 % Apply 1 to 3 patches to back daily. Remove & Discard patches within 12 hours. 90 patch 5    magnesium glycinate 100 mg Tab Take 500 mg by mouth once daily at 6am.      metFORMIN (GLUCOPHAGE) 500 MG tablet TAKE 1 TABLET(500 MG) BY MOUTH DAILY WITH BREAKFAST 90 tablet 1    montelukast (SINGULAIR) 10 mg tablet TAKE 1 TABLET BY MOUTH  DAILY 90 tablet 3    ondansetron (ZOFRAN-ODT) 8 MG TbDL Take 1 tablet (8 mg total) by mouth every 8 (eight) hours as needed (nausea). 30 tablet 0    pantoprazole (PROTONIX) 20 MG tablet Take 1 tablet (20 mg total) by mouth once daily. 90 tablet 2    triamcinolone acetonide 0.1% (KENALOG) 0.1 % paste APPLY TO THE AFFECTED AREA WITH EVERY-TIP THREE TIMES DAILY      vitamin D 1000 units Tab Take 1,000 Units by mouth once daily. D3      HYDROcodone-acetaminophen (NORCO) 5-325 mg per tablet Take 1 tablet by  "mouth every 8 (eight) hours as needed for Pain. (Patient not taking: Reported on 12/8/2022) 30 tablet 0     No current facility-administered medications for this visit.       Review of Systems   Constitutional:  Positive for activity change and fatigue. Negative for appetite change, chills, fever and unexpected weight change.   HENT:  Positive for hearing loss and sinus pressure. Negative for sinus pain, tinnitus and trouble swallowing.    Eyes: Negative.    Respiratory:  Negative for cough, shortness of breath, wheezing and stridor.    Cardiovascular:  Negative for chest pain, palpitations and leg swelling.   Gastrointestinal:  Negative for abdominal pain, anal bleeding, blood in stool, constipation and diarrhea.   Endocrine: Negative.    Genitourinary:  Negative for urgency and vaginal pain.   Musculoskeletal:  Negative for back pain, myalgias and neck stiffness.   Skin: Negative.    Allergic/Immunologic: Negative.    Neurological:  Positive for headaches. Negative for syncope and speech difficulty.   Hematological: Negative.      ECOG Performance Status: 2   Objective:      Vitals:   Vitals:    01/23/23 1522   BP: 128/60   BP Location: Left arm   Patient Position: Sitting   Pulse: 63   Resp: 18   Temp: 98.4 °F (36.9 °C)   TempSrc: Oral   SpO2: 98%   Weight: 61.3 kg (135 lb 0.5 oz)   Height: 5' 2" (1.575 m)         Physical Exam  Constitutional:       General: She is not in acute distress.     Appearance: She is not ill-appearing.   HENT:      Head: Normocephalic and atraumatic.      Mouth/Throat:      Pharynx: Oropharynx is clear.   Eyes:      Pupils: Pupils are equal, round, and reactive to light.   Cardiovascular:      Rate and Rhythm: Normal rate and regular rhythm.      Heart sounds: No murmur heard.    No friction rub. No gallop.   Pulmonary:      Effort: Pulmonary effort is normal. No respiratory distress.      Breath sounds: Normal breath sounds. No wheezing or rales.   Abdominal:      General: There is no " distension.      Palpations: Abdomen is soft. There is no mass.      Tenderness: There is no abdominal tenderness.   Musculoskeletal:         General: No swelling.   Skin:     Coloration: Skin is not jaundiced or pale.      Findings: No rash.   Neurological:      General: No focal deficit present.      Mental Status: She is oriented to person, place, and time.       Laboratory Data:  Lab Visit on 01/23/2023   Component Date Value Ref Range Status    WBC 01/23/2023 5.43  3.90 - 12.70 K/uL Final    RBC 01/23/2023 3.54 (L)  4.00 - 5.40 M/uL Final    Hemoglobin 01/23/2023 11.7 (L)  12.0 - 16.0 g/dL Final    Hematocrit 01/23/2023 35.9 (L)  37.0 - 48.5 % Final    MCV 01/23/2023 101 (H)  82 - 98 fL Final    MCH 01/23/2023 33.1 (H)  27.0 - 31.0 pg Final    MCHC 01/23/2023 32.6  32.0 - 36.0 g/dL Final    RDW 01/23/2023 13.6  11.5 - 14.5 % Final    Platelets 01/23/2023 85 (L)  150 - 450 K/uL Final    MPV 01/23/2023 10.4  9.2 - 12.9 fL Final    Immature Granulocytes 01/23/2023 0.4  0.0 - 0.5 % Final    Gran # (ANC) 01/23/2023 3.1  1.8 - 7.7 K/uL Final    Immature Grans (Abs) 01/23/2023 0.02  0.00 - 0.04 K/uL Final    Comment: Mild elevation in immature granulocytes is non specific and   can be seen in a variety of conditions including stress response,   acute inflammation, trauma and pregnancy. Correlation with other   laboratory and clinical findings is essential.      Lymph # 01/23/2023 1.6  1.0 - 4.8 K/uL Final    Mono # 01/23/2023 0.6  0.3 - 1.0 K/uL Final    Eos # 01/23/2023 0.2  0.0 - 0.5 K/uL Final    Baso # 01/23/2023 0.04  0.00 - 0.20 K/uL Final    nRBC 01/23/2023 0  0 /100 WBC Final    Gran % 01/23/2023 56.2  38.0 - 73.0 % Final    Lymph % 01/23/2023 28.9  18.0 - 48.0 % Final    Mono % 01/23/2023 10.3  4.0 - 15.0 % Final    Eosinophil % 01/23/2023 3.5  0.0 - 8.0 % Final    Basophil % 01/23/2023 0.7  0.0 - 1.9 % Final    Differential Method 01/23/2023 Automated   Final     WBC   Date Value Ref Range Status    01/23/2023 5.43 3.90 - 12.70 K/uL Final     Hemoglobin   Date Value Ref Range Status   01/23/2023 11.7 (L) 12.0 - 16.0 g/dL Final     POC Hematocrit   Date Value Ref Range Status   05/28/2022 40 36 - 54 %PCV Final     Hematocrit   Date Value Ref Range Status   01/23/2023 35.9 (L) 37.0 - 48.5 % Final     Platelets   Date Value Ref Range Status   01/23/2023 85 (L) 150 - 450 K/uL Final     Gran # (ANC)   Date Value Ref Range Status   01/23/2023 3.1 1.8 - 7.7 K/uL Final     Gran %   Date Value Ref Range Status   01/23/2023 56.2 38.0 - 73.0 % Final     Sodium   Date Value Ref Range Status   12/08/2022 136 136 - 145 mmol/L Final     Potassium   Date Value Ref Range Status   12/08/2022 4.1 3.5 - 5.1 mmol/L Final     BUN   Date Value Ref Range Status   12/08/2022 8 8 - 23 mg/dL Final           Assessment and plan        1. Myelodysplastic syndrome      Mrs Judd, an 78 y.o. F with a diagnosed of MDS-SLD after presenting with mild thrombocytopenia. On diagnosis, her R-IPSS score was 1.5 and risk category of very low.   She was placed on surveillance, and has rarely required any transfusions. Blood counts noted to fluctuate during period of stress (SBO, surgeries,..etc).     Her most recent CBC from today shows Hgb 11.7 g.dl, ANC 3100, and PLT 85 k/ul.     Will continue surveillance every six months with CBC.     The above was staffed and discussed with Dr. Madison Cabezas MD  [id number]  Hematology/Oncology fellow

## 2023-01-23 ENCOUNTER — OFFICE VISIT (OUTPATIENT)
Dept: HEMATOLOGY/ONCOLOGY | Facility: CLINIC | Age: 78
End: 2023-01-23
Payer: MEDICARE

## 2023-01-23 ENCOUNTER — PATIENT MESSAGE (OUTPATIENT)
Dept: INTERNAL MEDICINE | Facility: CLINIC | Age: 78
End: 2023-01-23
Payer: MEDICARE

## 2023-01-23 ENCOUNTER — LAB VISIT (OUTPATIENT)
Dept: LAB | Facility: HOSPITAL | Age: 78
End: 2023-01-23
Payer: MEDICARE

## 2023-01-23 VITALS
DIASTOLIC BLOOD PRESSURE: 60 MMHG | SYSTOLIC BLOOD PRESSURE: 128 MMHG | TEMPERATURE: 98 F | HEART RATE: 63 BPM | OXYGEN SATURATION: 98 % | HEIGHT: 62 IN | BODY MASS INDEX: 24.85 KG/M2 | RESPIRATION RATE: 18 BRPM | WEIGHT: 135.06 LBS

## 2023-01-23 DIAGNOSIS — D46.9 MYELODYSPLASTIC SYNDROME: ICD-10-CM

## 2023-01-23 DIAGNOSIS — D46.9 MYELODYSPLASTIC SYNDROME: Primary | ICD-10-CM

## 2023-01-23 LAB
BASOPHILS # BLD AUTO: 0.04 K/UL (ref 0–0.2)
BASOPHILS NFR BLD: 0.7 % (ref 0–1.9)
DIFFERENTIAL METHOD: ABNORMAL
EOSINOPHIL # BLD AUTO: 0.2 K/UL (ref 0–0.5)
EOSINOPHIL NFR BLD: 3.5 % (ref 0–8)
ERYTHROCYTE [DISTWIDTH] IN BLOOD BY AUTOMATED COUNT: 13.6 % (ref 11.5–14.5)
HCT VFR BLD AUTO: 35.9 % (ref 37–48.5)
HGB BLD-MCNC: 11.7 G/DL (ref 12–16)
IMM GRANULOCYTES # BLD AUTO: 0.02 K/UL (ref 0–0.04)
IMM GRANULOCYTES NFR BLD AUTO: 0.4 % (ref 0–0.5)
LYMPHOCYTES # BLD AUTO: 1.6 K/UL (ref 1–4.8)
LYMPHOCYTES NFR BLD: 28.9 % (ref 18–48)
MCH RBC QN AUTO: 33.1 PG (ref 27–31)
MCHC RBC AUTO-ENTMCNC: 32.6 G/DL (ref 32–36)
MCV RBC AUTO: 101 FL (ref 82–98)
MONOCYTES # BLD AUTO: 0.6 K/UL (ref 0.3–1)
MONOCYTES NFR BLD: 10.3 % (ref 4–15)
NEUTROPHILS # BLD AUTO: 3.1 K/UL (ref 1.8–7.7)
NEUTROPHILS NFR BLD: 56.2 % (ref 38–73)
NRBC BLD-RTO: 0 /100 WBC
PLATELET # BLD AUTO: 85 K/UL (ref 150–450)
PMV BLD AUTO: 10.4 FL (ref 9.2–12.9)
RBC # BLD AUTO: 3.54 M/UL (ref 4–5.4)
WBC # BLD AUTO: 5.43 K/UL (ref 3.9–12.7)

## 2023-01-23 PROCEDURE — 99215 PR OFFICE/OUTPT VISIT, EST, LEVL V, 40-54 MIN: ICD-10-PCS | Mod: S$PBB,GC,, | Performed by: STUDENT IN AN ORGANIZED HEALTH CARE EDUCATION/TRAINING PROGRAM

## 2023-01-23 PROCEDURE — 99215 OFFICE O/P EST HI 40 MIN: CPT | Mod: S$PBB,GC,, | Performed by: STUDENT IN AN ORGANIZED HEALTH CARE EDUCATION/TRAINING PROGRAM

## 2023-01-23 PROCEDURE — 99215 OFFICE O/P EST HI 40 MIN: CPT | Mod: PBBFAC | Performed by: STUDENT IN AN ORGANIZED HEALTH CARE EDUCATION/TRAINING PROGRAM

## 2023-01-23 PROCEDURE — 99999 PR PBB SHADOW E&M-EST. PATIENT-LVL V: CPT | Mod: PBBFAC,GC,, | Performed by: STUDENT IN AN ORGANIZED HEALTH CARE EDUCATION/TRAINING PROGRAM

## 2023-01-23 PROCEDURE — 36415 COLL VENOUS BLD VENIPUNCTURE: CPT | Performed by: STUDENT IN AN ORGANIZED HEALTH CARE EDUCATION/TRAINING PROGRAM

## 2023-01-23 PROCEDURE — 85025 COMPLETE CBC W/AUTO DIFF WBC: CPT | Performed by: STUDENT IN AN ORGANIZED HEALTH CARE EDUCATION/TRAINING PROGRAM

## 2023-01-23 PROCEDURE — 99999 PR PBB SHADOW E&M-EST. PATIENT-LVL V: ICD-10-PCS | Mod: PBBFAC,GC,, | Performed by: STUDENT IN AN ORGANIZED HEALTH CARE EDUCATION/TRAINING PROGRAM

## 2023-01-24 ENCOUNTER — TELEPHONE (OUTPATIENT)
Dept: OTOLARYNGOLOGY | Facility: CLINIC | Age: 78
End: 2023-01-24
Payer: MEDICARE

## 2023-01-24 NOTE — TELEPHONE ENCOUNTER
Returned pt call. LM on VM.     ----- Message from Bipin Mead MA sent at 1/24/2023  2:40 PM CST -----  Regarding: FW: needs appointment    ----- Message -----  From: Abena Sam MA  Sent: 1/24/2023   2:36 PM CST  To: Darci Guthrie MD, [facility] Ent Clinical Staff  Subject: needs appointment                                Can we get this patient an appt with someone for chronic sinus infections with head aches and low grade fever. She is sick and prefers not to be scheduled before 10am

## 2023-01-28 RX ORDER — POLYETHYLENE GLYCOL 3350 17 G/17G
17 POWDER, FOR SOLUTION ORAL DAILY
Qty: 90 EACH | Refills: 3 | Status: SHIPPED | OUTPATIENT
Start: 2023-01-28

## 2023-02-06 ENCOUNTER — DOCUMENTATION ONLY (OUTPATIENT)
Dept: AUDIOLOGY | Facility: CLINIC | Age: 78
End: 2023-02-06
Payer: MEDICARE

## 2023-02-06 ENCOUNTER — PATIENT MESSAGE (OUTPATIENT)
Dept: INTERNAL MEDICINE | Facility: CLINIC | Age: 78
End: 2023-02-06
Payer: MEDICARE

## 2023-02-06 NOTE — PROGRESS NOTES
Called Ms. Judd to see how she is doing with her new hearing aids. She stated she had an MRI that showed fluid in her sinuses so she has not been hearing well. She reported she would like to come in to have her hearing aids adjusted once her sinus issues are resolved. Ms. Judd was advised to call our [facility] office when she would like to make an appointment for a hearing aid follow-up.

## 2023-02-10 ENCOUNTER — PATIENT MESSAGE (OUTPATIENT)
Dept: INTERNAL MEDICINE | Facility: CLINIC | Age: 78
End: 2023-02-10
Payer: MEDICARE

## 2023-02-10 DIAGNOSIS — E03.9 HYPOTHYROIDISM, UNSPECIFIED TYPE: Primary | ICD-10-CM

## 2023-02-10 RX ORDER — LEVOTHYROXINE SODIUM 75 UG/1
75 TABLET ORAL
Qty: 30 TABLET | Refills: 0 | Status: SHIPPED | OUTPATIENT
Start: 2023-02-10 | End: 2023-02-13

## 2023-02-13 DIAGNOSIS — E03.9 HYPOTHYROIDISM, UNSPECIFIED TYPE: ICD-10-CM

## 2023-02-13 RX ORDER — LEVOTHYROXINE SODIUM 75 UG/1
75 TABLET ORAL
Qty: 90 TABLET | Refills: 3 | Status: SHIPPED | OUTPATIENT
Start: 2023-02-13 | End: 2024-01-24

## 2023-02-25 ENCOUNTER — PATIENT MESSAGE (OUTPATIENT)
Dept: INTERNAL MEDICINE | Facility: CLINIC | Age: 78
End: 2023-02-25
Payer: MEDICARE

## 2023-02-27 DIAGNOSIS — E11.69 TYPE 2 DIABETES MELLITUS WITH OTHER SPECIFIED COMPLICATION, WITHOUT LONG-TERM CURRENT USE OF INSULIN: Primary | ICD-10-CM

## 2023-02-27 RX ORDER — METFORMIN HYDROCHLORIDE 500 MG/1
500 TABLET ORAL 2 TIMES DAILY WITH MEALS
Qty: 180 TABLET | Refills: 3 | Status: SHIPPED | OUTPATIENT
Start: 2023-02-27 | End: 2024-02-15

## 2023-03-02 ENCOUNTER — CLINICAL SUPPORT (OUTPATIENT)
Dept: AUDIOLOGY | Facility: CLINIC | Age: 78
End: 2023-03-02
Payer: MEDICARE

## 2023-03-02 ENCOUNTER — OFFICE VISIT (OUTPATIENT)
Dept: OTOLARYNGOLOGY | Facility: CLINIC | Age: 78
End: 2023-03-02
Payer: MEDICARE

## 2023-03-02 VITALS — DIASTOLIC BLOOD PRESSURE: 64 MMHG | SYSTOLIC BLOOD PRESSURE: 110 MMHG | HEART RATE: 64 BPM

## 2023-03-02 DIAGNOSIS — R03.1 LOW BLOOD PRESSURE READING: ICD-10-CM

## 2023-03-02 DIAGNOSIS — R04.0 RIGHT-SIDED EPISTAXIS: ICD-10-CM

## 2023-03-02 DIAGNOSIS — R05.1 ACUTE COUGH: ICD-10-CM

## 2023-03-02 DIAGNOSIS — Z88.9 HISTORY OF MULTIPLE ALLERGIES: ICD-10-CM

## 2023-03-02 DIAGNOSIS — Z98.890 HISTORY OF NASAL SURGERY: Primary | ICD-10-CM

## 2023-03-02 DIAGNOSIS — H90.3 SENSORINEURAL HEARING LOSS (SNHL), BILATERAL: Primary | ICD-10-CM

## 2023-03-02 DIAGNOSIS — Z97.4 WEARS HEARING AID IN BOTH EARS: ICD-10-CM

## 2023-03-02 DIAGNOSIS — Z87.19 HISTORY OF GASTROESOPHAGEAL REFLUX (GERD): ICD-10-CM

## 2023-03-02 DIAGNOSIS — J34.0 NASAL SEPTAL ULCER: ICD-10-CM

## 2023-03-02 PROCEDURE — 30901 PR CTRL 2SEBLEED,ANTER,SIMPLE: ICD-10-PCS | Mod: S$PBB,RT,, | Performed by: OTOLARYNGOLOGY

## 2023-03-02 PROCEDURE — 99999 PR PBB SHADOW E&M-EST. PATIENT-LVL IV: CPT | Mod: PBBFAC,,, | Performed by: OTOLARYNGOLOGY

## 2023-03-02 PROCEDURE — 99213 OFFICE O/P EST LOW 20 MIN: CPT | Mod: S$PBB,25,, | Performed by: OTOLARYNGOLOGY

## 2023-03-02 PROCEDURE — 99213 PR OFFICE/OUTPT VISIT, EST, LEVL III, 20-29 MIN: ICD-10-PCS | Mod: S$PBB,25,, | Performed by: OTOLARYNGOLOGY

## 2023-03-02 PROCEDURE — 99214 OFFICE O/P EST MOD 30 MIN: CPT | Mod: PBBFAC | Performed by: OTOLARYNGOLOGY

## 2023-03-02 PROCEDURE — 92567 TYMPANOMETRY: CPT | Mod: PBBFAC

## 2023-03-02 PROCEDURE — 99999 PR PBB SHADOW E&M-EST. PATIENT-LVL IV: ICD-10-PCS | Mod: PBBFAC,,, | Performed by: OTOLARYNGOLOGY

## 2023-03-02 PROCEDURE — 92557 COMPREHENSIVE HEARING TEST: CPT | Mod: PBBFAC

## 2023-03-02 PROCEDURE — 30901 CONTROL OF NOSEBLEED: CPT | Mod: S$PBB,RT,, | Performed by: OTOLARYNGOLOGY

## 2023-03-02 PROCEDURE — 30901 CONTROL OF NOSEBLEED: CPT | Mod: PBBFAC | Performed by: OTOLARYNGOLOGY

## 2023-03-02 NOTE — PROGRESS NOTES
"CC: Sinus/allergies + nasal bleeding  HPI:Ms. Judd is an 78 year old CF with myelodysplastic syndrome who is accompanied by an attendant today. She lives at home.  Her recent chief complaint is right sided nasal bleeding, recurrent. Her last major bleeding episode occurred last week.  She is able to control them although she has spit out some clots ( described as fleshy material)  at times.   She denies use of aspirin or blood thinners.   However, her initial complaints and the reason she made the appointment is "allergies" and a possible sinus disorder.  She describes a head fullness sensation as well as fatigue, ear pain and swollen gums.    She has been taking Zyrtec for allergies recently; Xyzal gave her side effects. She has tried Claritin in the past.   She indicates choking at night and having to clear secretions from her throat. She endorses GERD sx recently. She asks about Mucinex use.  She indicates 2 previous nasal and sinus surgical procedures. The first was performed by Dr. Blanca Mcgill and helped her for quite a while. The second was a sinuplasty procedure by another surgeon.  She has completed a brain MRI in January this year which indicated right mastoid fluid without any significant sinus pathology. This scan is reviewed with her.  She is followed here by the audiology team for hearing aid adjustments. Ms. Judd indicates ear discomfort at times.    He indicates running 99 degree temperature at times, never above 100°.     She stopped utilizing [employer] sinus irrigations recently.   She is familiar with placing bacitracin into her nasal vestibules p.r.n..    H&H values 1 month ago were 11.7 and 35.9.    Past Medical History:   Diagnosis Date    Abdominal pain     Allergic rhinitis     Arthritis     Blood platelet disorder     Blood platelet disorder     Blood transfusion     during delivery and     Bowel obstruction     Cervical radiculopathy     followed by dr cloud    Colon cancer " 2011    transverse colon; resected; Stage IIA (pT3 pN0 MX)    Degenerative joint disease (DJD) of lumbar spine     Diabetes mellitus     Diarrhea     Falls 01/2020    Family history of breast cancer     Family history of colon cancer     Fatty liver     GERD (gastroesophageal reflux disease)     History of shingles     Hyperlipidemia     Hypomagnesemia     Hypothyroidism     Irritable bowel syndrome     Microscopic colitis     treated 2013    Migraine     Myelodysplastic syndrome     Raynaud phenomenon     Raynaud's disease     Squamous cell carcinoma of skin     Type 2 diabetes mellitus        Answers submitted by the patient for this visit:  Review of Symptoms Questionnaire  (Submitted on 2/27/2023)  Fatigue (Tiredness)?: Yes  fever: Yes  chills: Yes  appetite change : Yes  hearing loss: Yes  ear pain: Yes  facial swelling: Yes  sinus pressure : Yes  Sinus infection(s)?: Yes  nosebleeds: Yes  postnasal drip: Yes  mouth sores: Yes  Tooth/Dental Problems?: Yes  sore throat: Yes  eye itching: Yes  Light sensitivity / Light hurts the eyes?: Yes  shortness of breath: Yes  cough: Yes  Irregular heartbeat?: Yes  abdominal pain: Yes  diarrhea: Yes  constipation: Yes  Acid Reflux?: Yes  Urinating too frequently?: Yes  Muscle aches / pain?: Yes  back pain: Yes  neck pain: Yes  rash: Yes  Seasonal Allergies?: Yes  dizziness: Yes  headaches: Yes  tremors: Yes  Light-headedness: Yes  swollen glands: Yes  Bruises or bleeds easily: Yes  decreased concentration: Yes  Feeling depressed?: Yes  nervous/ anxious: Yes  sleep d    PE:Gen.: alert and oriented pale CF in no acute distress  Blood pressure 110/64 pulse 64 ht 5 ft 10 in weight 135 lb  Both ears were examined per otoscopy.  Both TMs are clear and intact in both middle ear spaces appear well aerated without evidence of hemotympanum.  A small amount of cerumen is removed the right ear canal with a curette.  She points out a small slightly raised scab skin lesion in the right  preauricular hairline which she expresses some concern about.   Nasal exam is interesting.  There is evidence for an area of excoriated nasal septal mucosa with ulceration and sl. oozing/scabbing located in the anterior inferior aspect of the right septum without a through and through perforation noted.  The right nasal passage is quite patent.  There is no evidence of polypoid disease or purulent discharge here.  Left nasal passage is patent as well is no evidence of purulent discharge there either.  Oropharyngeal exam is unremarkable for inflammation, infection, ulceration or thrush.  Posterior pharynx is not inflamed.  The uvula is not swollen or edematous.  Neck examination is within normal limits    She completed an audiogram today and the results are reviewed with her in detail.          DIAGNOSIS:     ICD-10-CM ICD-9-CM    1. History of nasal surgery  Z98.890 V45.89       2. Right-sided epistaxis  R04.0 784.7       3. Acute cough  R05.1 786.2       4. History of multiple allergies  Z88.9 V15.09     on Zyrtec ; Xyzal has side effects      5. Low blood pressure reading  R03.1 796.3       6. Nasal septal ulcer  J34.0 478.19     anterior/inferior septum excoriatioon/ulcer/bleeding        7. History of gastroesophageal reflux (GERD)  Z87.19 V12.79       8. Wears hearing aid in both ears  Z97.4 [id number]       9.      Myelodysplastic syndrome  10.    Small skin lesion, right preauricular hairline;   PLAN: Audiometry reviewed; no significant change in hearing documented   AGNO3 applied to right anterior/inferior nasal septal ulcer; stop using nasal irrigation for now; do not manipulate nose/pick at nose  Pt. may follow up with nasal/sinus specialist Dr. GIO Greenwood re nasal/sinus status pending course  AYR nasal mist use encouraged prn  Pt. may check with PCP re: fatigue/malaise/headache/low blood pressure   Jan 2023 brain MRI study reviewed with patient; right mastoid fluid noted; no significant  sinus disease  elucidated  To ED for significant bleeding/hemorrhage  May place Bacitracin ointment to right nasal vestibule nightly x 2 weeks   Anti GERD literature provided  May apply bacitracin to small superficial pre-auricular skin lesion x 10 days

## 2023-03-02 NOTE — PROGRESS NOTES
Anayeli Judd was seen today in the clinic for an audiologic evaluation.  Patient's main complaint was decreased hearing, more so since her last audiogram. She is currently fit with [employer] MORE 1 miniRITE R hearing aids.  Ms. Judd reported aural fullness in both ears that is worse in her left ear. She reported that her left ear feels sore. Mrs. Judd denied tinnitus, true vertigo, and history of noise exposure. Her previous audiogram on 12/08/2022 revealed a mild sloping to severe sensorineural hearing loss (SNHL) in the right ear and a normal sloping to moderately-severe SNHL in the left ear. She reported a perceived change in hearing since her last audiogram.    Tympanometry revealed Type As in the right ear and Type As in the left ear.     Audiogram results revealed a mild to severe sensorineural hearing loss (SNHL), bilaterally.    Speech reception thresholds were noted at 45 dBHL in the right ear and 45 dBHL in the left ear.    Speech discrimination scores were 92% in the right ear and 92% in the left ear.    Recommendations:  Otologic evaluation  Daily and consistent use of binaural amplification  Hearing aid follow-up as needed  Annual audiogram or sooner if change perceived  Hearing protection in noise

## 2023-03-02 NOTE — PATIENT INSTRUCTIONS
Audiometry reviewed; no significant change in hearing documented   AGNO3 applied to right anterior/inferior nasal septal ulcer; stop using nasal irrigation for now; do not manipulate nose/pick at nose  Pt. may follow up with nasal / sinus specialist Dr. GIO Greenwood re nasal/sinus status pending course  AYR nasal mist use encouraged prn  Pt. may check with PCP re: fatigue/malaise/headache/low blood pressure   Jan 2023 brain MRI study reviewed with patient; right mastoid fluid noted; no significant  sinus disease elucidated  To ED for significant bleeding/hemorrhage  May place Bacitracin ointment to right nasal vestibule nightly x 2 weeks   Anti-GERD literature provided

## 2023-03-06 ENCOUNTER — TELEPHONE (OUTPATIENT)
Dept: OTOLARYNGOLOGY | Facility: CLINIC | Age: 78
End: 2023-03-06
Payer: MEDICARE

## 2023-03-06 NOTE — TELEPHONE ENCOUNTER
----- Message from Karen Rodriguez sent at 3/6/2023 11:28 AM CST -----  Contact: [phone]  the patient is calling to get scheduled for a appt.  Pt access tried but no appts are available.  the patient can be reached at.   [phone]

## 2023-03-21 ENCOUNTER — TELEPHONE (OUTPATIENT)
Dept: INTERNAL MEDICINE | Facility: CLINIC | Age: 78
End: 2023-03-21
Payer: MEDICARE

## 2023-03-21 DIAGNOSIS — B02.9 HERPES ZOSTER WITHOUT COMPLICATION: ICD-10-CM

## 2023-03-21 RX ORDER — VALACYCLOVIR HYDROCHLORIDE 1 G/1
1000 TABLET, FILM COATED ORAL 3 TIMES DAILY
Qty: 21 TABLET | Refills: 0 | Status: SHIPPED | OUTPATIENT
Start: 2023-03-21 | End: 2023-04-17

## 2023-03-21 NOTE — TELEPHONE ENCOUNTER
----- Message from Abena Sam MA sent at 3/21/2023 10:04 AM CDT -----  Regarding: RE: Rx Request  Left sided rash at temple down into jaw area. Started yesterday. Very painful. She is having blurred vision but has been having this and it is not worse, She also says that she has TMJ and that is really hurting. She is also saying that she is having night sweats and is asking if shingles can cause this.  ----- Message -----  From: Darci Guthrie MD  Sent: 3/21/2023   9:48 AM CDT  To: Abena Sam MA  Subject: FW: Rx Request                                   Can let Mrs. Guadalupe know I'm back in today and just sent Valtrex prescription to her [facility].    Myles Diaz  ----- Message -----  From: Faina Byrne  Sent: 3/20/2023   1:20 PM CDT  To: Darci Guthrie MD, [facility] Staff  Subject: Rx Request                                       Anayeli showing signs of Shingles again (stared today). Last few nights she woken up covered in sweat. Can you please write her in an Rx to her Pharm[facility], [facility] [address]. Thank you

## 2023-03-21 NOTE — TELEPHONE ENCOUNTER
Phone call with pt. Notes shingles flair on upper forehead. Rash is painful with pimple like spots. Not itching. No eye changes or involvement (baseline blurred vision followed by Optho). Baseline hearing issues. No acute changes. Rash does not involve ear area per pt.  Start Valtrex TID x7 days. She will let me know if any potential involvement of eye immediately.

## 2023-03-29 ENCOUNTER — PATIENT MESSAGE (OUTPATIENT)
Dept: ADMINISTRATIVE | Facility: OTHER | Age: 78
End: 2023-03-29
Payer: MEDICARE

## 2023-04-03 ENCOUNTER — HOSPITAL ENCOUNTER (INPATIENT)
Facility: HOSPITAL | Age: 78
LOS: 2 days | Discharge: HOME HEALTH | DRG: 390 | End: 2023-04-05
Attending: EMERGENCY MEDICINE | Admitting: SURGERY
Payer: MEDICARE

## 2023-04-03 DIAGNOSIS — R10.9 ABDOMINAL PAIN: ICD-10-CM

## 2023-04-03 DIAGNOSIS — E11.9 TYPE 2 DIABETES MELLITUS WITHOUT COMPLICATION, WITHOUT LONG-TERM CURRENT USE OF INSULIN: Chronic | ICD-10-CM

## 2023-04-03 DIAGNOSIS — K21.9 GASTROESOPHAGEAL REFLUX DISEASE, UNSPECIFIED WHETHER ESOPHAGITIS PRESENT: Chronic | ICD-10-CM

## 2023-04-03 DIAGNOSIS — K56.609 SBO (SMALL BOWEL OBSTRUCTION): ICD-10-CM

## 2023-04-03 DIAGNOSIS — K56.609 SMALL BOWEL OBSTRUCTION: Primary | ICD-10-CM

## 2023-04-03 DIAGNOSIS — E03.9 ACQUIRED HYPOTHYROIDISM: Chronic | ICD-10-CM

## 2023-04-03 LAB
ALBUMIN SERPL BCP-MCNC: 4.3 G/DL (ref 3.5–5.2)
ALLENS TEST: ABNORMAL
ALP SERPL-CCNC: 71 U/L (ref 55–135)
ALT SERPL W/O P-5'-P-CCNC: 39 U/L (ref 10–44)
ANION GAP SERPL CALC-SCNC: 11 MMOL/L (ref 8–16)
APTT PPP: 31.1 SEC (ref 21–32)
AST SERPL-CCNC: 38 U/L (ref 10–40)
B-OH-BUTYR BLD STRIP-SCNC: 0.2 MMOL/L (ref 0–0.5)
BACTERIA #/AREA URNS AUTO: ABNORMAL /HPF
BASOPHILS # BLD AUTO: 0.04 K/UL (ref 0–0.2)
BASOPHILS NFR BLD: 0.4 % (ref 0–1.9)
BILIRUB SERPL-MCNC: 1.2 MG/DL (ref 0.1–1)
BILIRUB UR QL STRIP: NEGATIVE
BUN SERPL-MCNC: 11 MG/DL (ref 6–30)
BUN SERPL-MCNC: 11 MG/DL (ref 8–23)
CALCIUM SERPL-MCNC: 10.6 MG/DL (ref 8.7–10.5)
CHLORIDE SERPL-SCNC: 102 MMOL/L (ref 95–110)
CHLORIDE SERPL-SCNC: 104 MMOL/L (ref 95–110)
CLARITY UR REFRACT.AUTO: CLEAR
CO2 SERPL-SCNC: 23 MMOL/L (ref 23–29)
COLOR UR AUTO: YELLOW
CREAT SERPL-MCNC: 0.9 MG/DL (ref 0.5–1.4)
CREAT SERPL-MCNC: 1 MG/DL (ref 0.5–1.4)
DELSYS: ABNORMAL
DIFFERENTIAL METHOD: ABNORMAL
EOSINOPHIL # BLD AUTO: 0.1 K/UL (ref 0–0.5)
EOSINOPHIL NFR BLD: 1.4 % (ref 0–8)
ERYTHROCYTE [DISTWIDTH] IN BLOOD BY AUTOMATED COUNT: 13.8 % (ref 11.5–14.5)
EST. GFR  (NO RACE VARIABLE): 57 ML/MIN/1.73 M^2
ESTIMATED AVG GLUCOSE: 206 MG/DL (ref 68–131)
FIO2: 21
GLUCOSE SERPL-MCNC: 335 MG/DL (ref 70–110)
GLUCOSE SERPL-MCNC: 336 MG/DL (ref 70–110)
GLUCOSE UR QL STRIP: ABNORMAL
HBA1C MFR BLD: 8.8 % (ref 4–5.6)
HCO3 UR-SCNC: 24.2 MMOL/L (ref 24–28)
HCT VFR BLD AUTO: 40.9 % (ref 37–48.5)
HCT VFR BLD CALC: 42 %PCV (ref 36–54)
HGB BLD-MCNC: 13.4 G/DL (ref 12–16)
HGB UR QL STRIP: NEGATIVE
IMM GRANULOCYTES # BLD AUTO: 0.03 K/UL (ref 0–0.04)
IMM GRANULOCYTES NFR BLD AUTO: 0.3 % (ref 0–0.5)
INR PPP: 1.1 (ref 0.8–1.2)
KETONES UR QL STRIP: NEGATIVE
LEUKOCYTE ESTERASE UR QL STRIP: NEGATIVE
LIPASE SERPL-CCNC: 87 U/L (ref 4–60)
LYMPHOCYTES # BLD AUTO: 1 K/UL (ref 1–4.8)
LYMPHOCYTES NFR BLD: 10.3 % (ref 18–48)
MCH RBC QN AUTO: 32.8 PG (ref 27–31)
MCHC RBC AUTO-ENTMCNC: 32.8 G/DL (ref 32–36)
MCV RBC AUTO: 100 FL (ref 82–98)
MICROSCOPIC COMMENT: ABNORMAL
MODE: ABNORMAL
MONOCYTES # BLD AUTO: 0.7 K/UL (ref 0.3–1)
MONOCYTES NFR BLD: 7.3 % (ref 4–15)
NEUTROPHILS # BLD AUTO: 7.8 K/UL (ref 1.8–7.7)
NEUTROPHILS NFR BLD: 80.3 % (ref 38–73)
NITRITE UR QL STRIP: NEGATIVE
NRBC BLD-RTO: 0 /100 WBC
PCO2 BLDA: 48.4 MMHG (ref 35–45)
PH SMN: 7.31 [PH] (ref 7.35–7.45)
PH UR STRIP: 6 [PH] (ref 5–8)
PHOSPHATE SERPL-MCNC: 3.3 MG/DL (ref 2.7–4.5)
PLATELET # BLD AUTO: 105 K/UL (ref 150–450)
PMV BLD AUTO: 10.6 FL (ref 9.2–12.9)
PO2 BLDA: 19 MMHG (ref 40–60)
POC BE: -2 MMOL/L
POC IONIZED CALCIUM: 1.27 MMOL/L (ref 1.06–1.42)
POC SATURATED O2: 24 % (ref 95–100)
POC TCO2 (MEASURED): 25 MMOL/L (ref 23–29)
POC TCO2: 26 MMOL/L (ref 24–29)
POCT GLUCOSE: 175 MG/DL (ref 70–110)
POCT GLUCOSE: 185 MG/DL (ref 70–110)
POTASSIUM BLD-SCNC: 4.1 MMOL/L (ref 3.5–5.1)
POTASSIUM SERPL-SCNC: 4.2 MMOL/L (ref 3.5–5.1)
PROT SERPL-MCNC: 8 G/DL (ref 6–8.4)
PROT UR QL STRIP: NEGATIVE
PROTHROMBIN TIME: 11.1 SEC (ref 9–12.5)
RBC # BLD AUTO: 4.08 M/UL (ref 4–5.4)
RBC #/AREA URNS AUTO: 1 /HPF (ref 0–4)
SAMPLE: ABNORMAL
SAMPLE: ABNORMAL
SITE: ABNORMAL
SODIUM BLD-SCNC: 137 MMOL/L (ref 136–145)
SODIUM SERPL-SCNC: 138 MMOL/L (ref 136–145)
SP GR UR STRIP: >1.03 (ref 1–1.03)
SP02: 99
SQUAMOUS #/AREA URNS AUTO: 1 /HPF
TROPONIN I SERPL DL<=0.01 NG/ML-MCNC: <0.006 NG/ML (ref 0–0.03)
URN SPEC COLLECT METH UR: ABNORMAL
WBC # BLD AUTO: 9.73 K/UL (ref 3.9–12.7)
WBC #/AREA URNS AUTO: 15 /HPF (ref 0–5)
YEAST UR QL AUTO: ABNORMAL

## 2023-04-03 PROCEDURE — 93010 EKG 12-LEAD: ICD-10-PCS | Mod: ,,, | Performed by: INTERNAL MEDICINE

## 2023-04-03 PROCEDURE — 25500020 PHARM REV CODE 255: Performed by: EMERGENCY MEDICINE

## 2023-04-03 PROCEDURE — 82010 KETONE BODYS QUAN: CPT

## 2023-04-03 PROCEDURE — 99900035 HC TECH TIME PER 15 MIN (STAT)

## 2023-04-03 PROCEDURE — 80053 COMPREHEN METABOLIC PANEL: CPT

## 2023-04-03 PROCEDURE — 84484 ASSAY OF TROPONIN QUANT: CPT

## 2023-04-03 PROCEDURE — 83690 ASSAY OF LIPASE: CPT

## 2023-04-03 PROCEDURE — 82803 BLOOD GASES ANY COMBINATION: CPT

## 2023-04-03 PROCEDURE — 25000003 PHARM REV CODE 250: Performed by: EMERGENCY MEDICINE

## 2023-04-03 PROCEDURE — 96376 TX/PRO/DX INJ SAME DRUG ADON: CPT

## 2023-04-03 PROCEDURE — 93005 ELECTROCARDIOGRAM TRACING: CPT

## 2023-04-03 PROCEDURE — 87088 URINE BACTERIA CULTURE: CPT

## 2023-04-03 PROCEDURE — 96374 THER/PROPH/DIAG INJ IV PUSH: CPT

## 2023-04-03 PROCEDURE — 99285 EMERGENCY DEPT VISIT HI MDM: CPT | Mod: 25

## 2023-04-03 PROCEDURE — 81003 URINALYSIS AUTO W/O SCOPE: CPT

## 2023-04-03 PROCEDURE — 20600001 HC STEP DOWN PRIVATE ROOM

## 2023-04-03 PROCEDURE — 99285 EMERGENCY DEPT VISIT HI MDM: CPT | Mod: ,,, | Performed by: EMERGENCY MEDICINE

## 2023-04-03 PROCEDURE — 83036 HEMOGLOBIN GLYCOSYLATED A1C: CPT

## 2023-04-03 PROCEDURE — 63600175 PHARM REV CODE 636 W HCPCS

## 2023-04-03 PROCEDURE — 85610 PROTHROMBIN TIME: CPT

## 2023-04-03 PROCEDURE — 93010 ELECTROCARDIOGRAM REPORT: CPT | Mod: ,,, | Performed by: INTERNAL MEDICINE

## 2023-04-03 PROCEDURE — 99285 PR EMERGENCY DEPT VISIT,LEVEL V: ICD-10-PCS | Mod: ,,, | Performed by: EMERGENCY MEDICINE

## 2023-04-03 PROCEDURE — 85025 COMPLETE CBC W/AUTO DIFF WBC: CPT

## 2023-04-03 PROCEDURE — 81001 URINALYSIS AUTO W/SCOPE: CPT

## 2023-04-03 PROCEDURE — 84100 ASSAY OF PHOSPHORUS: CPT

## 2023-04-03 PROCEDURE — 87077 CULTURE AEROBIC IDENTIFY: CPT | Mod: 59

## 2023-04-03 PROCEDURE — 87086 URINE CULTURE/COLONY COUNT: CPT

## 2023-04-03 PROCEDURE — 87186 SC STD MICRODIL/AGAR DIL: CPT

## 2023-04-03 PROCEDURE — 63600175 PHARM REV CODE 636 W HCPCS: Performed by: EMERGENCY MEDICINE

## 2023-04-03 PROCEDURE — 85730 THROMBOPLASTIN TIME PARTIAL: CPT

## 2023-04-03 RX ORDER — MORPHINE SULFATE 2 MG/ML
2 INJECTION, SOLUTION INTRAMUSCULAR; INTRAVENOUS EVERY 4 HOURS PRN
Status: DISCONTINUED | OUTPATIENT
Start: 2023-04-03 | End: 2023-04-05

## 2023-04-03 RX ORDER — LEVOTHYROXINE SODIUM 20 UG/ML
35 INJECTION, SOLUTION INTRAVENOUS DAILY
Status: DISCONTINUED | OUTPATIENT
Start: 2023-04-04 | End: 2023-04-05

## 2023-04-03 RX ORDER — MORPHINE SULFATE 4 MG/ML
4 INJECTION, SOLUTION INTRAMUSCULAR; INTRAVENOUS
Status: COMPLETED | OUTPATIENT
Start: 2023-04-03 | End: 2023-04-03

## 2023-04-03 RX ORDER — ENOXAPARIN SODIUM 100 MG/ML
40 INJECTION SUBCUTANEOUS EVERY 24 HOURS
Status: DISCONTINUED | OUTPATIENT
Start: 2023-04-03 | End: 2023-04-05 | Stop reason: HOSPADM

## 2023-04-03 RX ORDER — INSULIN ASPART 100 [IU]/ML
0-5 INJECTION, SOLUTION INTRAVENOUS; SUBCUTANEOUS EVERY 6 HOURS PRN
Status: DISCONTINUED | OUTPATIENT
Start: 2023-04-03 | End: 2023-04-05

## 2023-04-03 RX ORDER — SODIUM CHLORIDE, SODIUM LACTATE, POTASSIUM CHLORIDE, CALCIUM CHLORIDE 600; 310; 30; 20 MG/100ML; MG/100ML; MG/100ML; MG/100ML
INJECTION, SOLUTION INTRAVENOUS CONTINUOUS
Status: DISCONTINUED | OUTPATIENT
Start: 2023-04-03 | End: 2023-04-05

## 2023-04-03 RX ORDER — SODIUM CHLORIDE 0.9 % (FLUSH) 0.9 %
10 SYRINGE (ML) INJECTION
Status: DISCONTINUED | OUTPATIENT
Start: 2023-04-03 | End: 2023-04-05 | Stop reason: HOSPADM

## 2023-04-03 RX ORDER — LIDOCAINE HYDROCHLORIDE 10 MG/ML
1 INJECTION, SOLUTION EPIDURAL; INFILTRATION; INTRACAUDAL; PERINEURAL ONCE AS NEEDED
Status: DISCONTINUED | OUTPATIENT
Start: 2023-04-03 | End: 2023-04-05 | Stop reason: HOSPADM

## 2023-04-03 RX ORDER — MORPHINE SULFATE 2 MG/ML
1 INJECTION, SOLUTION INTRAMUSCULAR; INTRAVENOUS EVERY 4 HOURS PRN
Status: DISCONTINUED | OUTPATIENT
Start: 2023-04-03 | End: 2023-04-04

## 2023-04-03 RX ORDER — PANTOPRAZOLE SODIUM 40 MG/10ML
40 INJECTION, POWDER, LYOPHILIZED, FOR SOLUTION INTRAVENOUS DAILY
Status: DISCONTINUED | OUTPATIENT
Start: 2023-04-04 | End: 2023-04-05

## 2023-04-03 RX ORDER — ONDANSETRON 4 MG/1
4 TABLET, ORALLY DISINTEGRATING ORAL
Status: COMPLETED | OUTPATIENT
Start: 2023-04-03 | End: 2023-04-03

## 2023-04-03 RX ORDER — ONDANSETRON 8 MG/1
8 TABLET, ORALLY DISINTEGRATING ORAL EVERY 8 HOURS PRN
Status: DISCONTINUED | OUTPATIENT
Start: 2023-04-03 | End: 2023-04-05 | Stop reason: HOSPADM

## 2023-04-03 RX ORDER — ONDANSETRON 2 MG/ML
4 INJECTION INTRAMUSCULAR; INTRAVENOUS
Status: DISCONTINUED | OUTPATIENT
Start: 2023-04-03 | End: 2023-04-03

## 2023-04-03 RX ORDER — GLUCAGON 1 MG
1 KIT INJECTION
Status: DISCONTINUED | OUTPATIENT
Start: 2023-04-03 | End: 2023-04-05

## 2023-04-03 RX ADMIN — IOHEXOL 15 ML: 350 INJECTION, SOLUTION INTRAVENOUS at 09:04

## 2023-04-03 RX ADMIN — SODIUM CHLORIDE, POTASSIUM CHLORIDE, SODIUM LACTATE AND CALCIUM CHLORIDE: 600; 310; 30; 20 INJECTION, SOLUTION INTRAVENOUS at 01:04

## 2023-04-03 RX ADMIN — SODIUM CHLORIDE, POTASSIUM CHLORIDE, SODIUM LACTATE AND CALCIUM CHLORIDE: 600; 310; 30; 20 INJECTION, SOLUTION INTRAVENOUS at 11:04

## 2023-04-03 RX ADMIN — ENOXAPARIN SODIUM 40 MG: 40 INJECTION SUBCUTANEOUS at 04:04

## 2023-04-03 RX ADMIN — MORPHINE SULFATE 4 MG: 4 INJECTION INTRAVENOUS at 08:04

## 2023-04-03 RX ADMIN — ONDANSETRON 4 MG: 4 TABLET, ORALLY DISINTEGRATING ORAL at 07:04

## 2023-04-03 RX ADMIN — IOHEXOL 75 ML: 350 INJECTION, SOLUTION INTRAVENOUS at 09:04

## 2023-04-03 RX ADMIN — MORPHINE SULFATE 2 MG: 2 INJECTION, SOLUTION INTRAMUSCULAR; INTRAVENOUS at 02:04

## 2023-04-03 RX ADMIN — MORPHINE SULFATE 4 MG: 4 INJECTION INTRAVENOUS at 09:04

## 2023-04-03 NOTE — ED PROVIDER NOTES
Encounter Date: 4/3/2023       History     Chief Complaint   Patient presents with    Abdominal Pain     Hx of colon ca. Pt reports chronic abdominal pain.      78-year-old female with a history of adenocarcinoma of the colon status post resection, multiple prior small bowel obstructions, IBS and type 2 diabetes presents with a chief complaint of abdominal pain since last night.  She says the pain is diffuse, constant and sharp in character.  She says that she is had this type of pain before but it has been many years.  She says that she has had associated nausea but no vomiting.  She says that she is having watery stools that has not had a solid stool in several days.  She says that she normally takes medications for constipation but she stopped them because her stools or good consistency.  She denies any recent fevers, cough, shortness of breath, chest pain, dysuria, blood in the stool or new rashes.    The history is provided by the patient. No  was used.   Review of patient's allergies indicates:   Allergen Reactions    Codeine Itching and Nausea And Vomiting    Dilaudid [hydromorphone (bulk)] Other (See Comments)     Oversedating, head burning. Pt prefers to avoid.       Percocet [oxycodone-acetaminophen] Itching    Sulfa (sulfonamide antibiotics) Itching and Nausea And Vomiting           Latex, natural rubber Rash     Past Medical History:   Diagnosis Date    Abdominal pain     Allergic rhinitis     Arthritis     Blood platelet disorder     Blood platelet disorder     Blood transfusion     during delivery and     Bowel obstruction     Cervical radiculopathy     followed by dr cloud    Colon cancer     transverse colon; resected; Stage IIA (pT3 pN0 MX)    Degenerative joint disease (DJD) of lumbar spine     Diabetes mellitus     Diarrhea     Falls 2020    Family history of breast cancer     Family history of colon cancer     Fatty liver     GERD (gastroesophageal reflux  disease)     History of shingles     Hyperlipidemia     Hypomagnesemia     Hypothyroidism     Irritable bowel syndrome     Microscopic colitis     treated     Migraine     Myelodysplastic syndrome     Raynaud phenomenon     Raynaud's disease     Squamous cell carcinoma of skin     Type 2 diabetes mellitus      Past Surgical History:   Procedure Laterality Date    ADENOIDECTOMY      APPENDECTOMY      BACK SURGERY      CARPAL TUNNEL RELEASE      bilateral      SECTION      CHOLECYSTECTOMY  1965    COLECTOMY  2011    Transverse colon resection by Dr. Aguirre    COLONOSCOPY N/A 2017    Procedure: COLONOSCOPY;  Surgeon: Manjit Alvarez MD;  Location: [facility] (4TH FLR);  Service: Endoscopy;  Laterality: N/A;    COLONOSCOPY N/A 2019    Procedure: COLONOSCOPY;  Surgeon: Ramiro Jefferson MD;  Location: [facility] (4TH FLR);  Service: Endoscopy;  Laterality: N/A;  PM prep    COLONOSCOPY N/A 2022    Procedure: COLONOSCOPY;  Surgeon: Ramiro Jefferson MD;  Location: [facility] (2ND FLR);  Service: Endoscopy;  Laterality: N/A;  EGD and colonoscopy in 6-8 weeks from now     states has constipation. -extended Golytely prep    CYSTOSCOPY N/A 2021    Procedure: CYSTOSCOPY;  Surgeon: Viridiana Valenzuela MD;  Location: [facility] OR 1ST FLR;  Service: Urology;  Laterality: N/A;    ENDOSCOPIC ULTRASOUND OF UPPER GASTROINTESTINAL TRACT N/A 2018    Procedure: ULTRASOUND, UPPER GI TRACT, ENDOSCOPIC;  Surgeon: Jose Hess MD;  Location: [facility];  Service: Endoscopy;  Laterality: N/A;    ENDOSCOPIC ULTRASOUND OF UPPER GASTROINTESTINAL TRACT N/A 2019    Procedure: ULTRASOUND, UPPER GI TRACT, ENDOSCOPIC;  Surgeon: Jose Hess MD;  Location: [facility] (2ND FLR);  Service: Endoscopy;  Laterality: N/A;    ESOPHAGOGASTRODUODENOSCOPY N/A 2018    Procedure: EGD (ESOPHAGOGASTRODUODENOSCOPY);  Surgeon: Angelo Reynolds MD;  Location: [facility] (2ND FLR);  Service: Endoscopy;   Laterality: N/A;    ESOPHAGOGASTRODUODENOSCOPY N/A 06/26/2019    Procedure: EGD (ESOPHAGOGASTRODUODENOSCOPY);  Surgeon: Ramiro Jefferson MD;  Location: [facility] ENDO (4TH FLR);  Service: Endoscopy;  Laterality: N/A;    ESOPHAGOGASTRODUODENOSCOPY N/A 5/4/2022    Procedure: EGD (ESOPHAGOGASTRODUODENOSCOPY);  Surgeon: Ramiro Jefferson MD;  Location: [facility] ENDO (2ND FLR);  Service: Endoscopy;  Laterality: N/A;  fully vaccinated-GT    EYE SURGERY      Cataract Removal    FLUOROSCOPIC URODYNAMIC STUDY N/A 11/09/2021    Procedure: URODYNAMIC STUDY, FLUOROSCOPIC;  Surgeon: Viridiana Valenzuela MD;  Location: [facility] OR 1ST FLR;  Service: Urology;  Laterality: N/A;  90 minutes     HYSTERECTOMY      JOINT REPLACEMENT      posterolateral fusion with autograft bone and Eun mineralized bone matrix  02/01/2013    at [facility] for lumbar spine stenosis    SMALL INTESTINE SURGERY      SPINE SURGERY      TOE SURGERY      TONSILLECTOMY      TRANSFORAMINAL EPIDURAL INJECTION OF STEROID Bilateral 12/21/2022    Procedure: Injection,steroid,epidural, Todd L5/S1 TF CONTRAST DIRECT REFERRAL;  Surgeon: Toni Osorio MD;  Location: [facility] PAIN MGT;  Service: Pain Management;  Laterality: Bilateral;    TRIGGER FINGER RELEASE       Family History   Problem Relation Age of Onset    Heart disease Father 50        Mi age 50    Colon cancer Father     Bladder Cancer Mother         non smoker    Cataracts Mother     Glaucoma Mother     Heart disease Mother     Hyperlipidemia Mother     Kidney disease Mother     Breast cancer Sister 79    Arthritis Daughter     Asthma Daughter     Depression Daughter     Hypertension Daughter     Stroke Daughter 40    Arthritis Brother     Colon cancer Brother 70    Alcohol abuse Brother     Parkinsonism Brother     Alcohol abuse Brother     Depression Daughter     Stroke Daughter     Alcohol abuse Brother     Arthritis Brother     Asthma Daughter     Depression Daughter     Celiac disease Neg Hx     Cirrhosis Neg Hx     Colon  polyps Neg Hx     Crohn's disease Neg Hx     Cystic fibrosis Neg Hx     Esophageal cancer Neg Hx     Hemochromatosis Neg Hx     Inflammatory bowel disease Neg Hx     Irritable bowel syndrome Neg Hx     Liver cancer Neg Hx     Liver disease Neg Hx     Rectal cancer Neg Hx     Stomach cancer Neg Hx     Ulcerative colitis Neg Hx     Eliazar's disease Neg Hx     Amblyopia Neg Hx     Blindness Neg Hx     Macular degeneration Neg Hx     Retinal detachment Neg Hx     Strabismus Neg Hx     Melanoma Neg Hx      Social History     Tobacco Use    Smoking status: Never    Smokeless tobacco: Never   Substance Use Topics    Alcohol use: Not Currently     Comment: socially, rarely    Drug use: Never     Review of Systems    Physical Exam     Initial Vitals [04/03/23 0639]   BP Pulse Resp Temp SpO2   (!) 150/73 71 16 98.3 °F (36.8 °C) 99 %      MAP       --         Physical Exam    Nursing note and vitals reviewed.  Constitutional: She appears well-developed and well-nourished. She is not diaphoretic. No distress.   HENT:   Head: Normocephalic and atraumatic.   Eyes: EOM are normal. Pupils are equal, round, and reactive to light.   Neck: Neck supple.   Normal range of motion.  Cardiovascular:  Normal rate, regular rhythm, normal heart sounds and intact distal pulses.           Pulmonary/Chest: Breath sounds normal. She has no wheezes. She has no rhonchi. She has no rales.   Abdominal: Abdomen is soft. She exhibits distension. She exhibits no mass. There is abdominal tenderness.   Hyperactive bowel sounds in all 4 quadrants, no right lower quadrant tenderness or right upper quadrant tenderness mild epigastric tenderness, moderate abdominal distention with guarding There is guarding. There is no rebound.   Musculoskeletal:         General: No tenderness or edema. Normal range of motion.      Cervical back: Normal range of motion and neck supple.     Neurological: She is alert and oriented to person, place, and time. She has normal  strength. GCS score is 15. GCS eye subscore is 4. GCS verbal subscore is 5. GCS motor subscore is 6.   Skin: Skin is warm and dry. Capillary refill takes less than 2 seconds. No rash noted. No erythema. No pallor.   Psychiatric: She has a normal mood and affect. Her behavior is normal. Judgment and thought content normal.       ED Course   Procedures  Labs Reviewed   CBC W/ AUTO DIFFERENTIAL - Abnormal; Notable for the following components:       Result Value     (*)     MCH 32.8 (*)     Platelets 105 (*)     Gran # (ANC) 7.8 (*)     Gran % 80.3 (*)     Lymph % 10.3 (*)     All other components within normal limits   COMPREHENSIVE METABOLIC PANEL - Abnormal; Notable for the following components:    Glucose 335 (*)     Calcium 10.6 (*)     Total Bilirubin 1.2 (*)     eGFR 57.0 (*)     All other components within normal limits   LIPASE - Abnormal; Notable for the following components:    Lipase 87 (*)     All other components within normal limits   HEMOGLOBIN A1C - Abnormal; Notable for the following components:    Hemoglobin A1C 8.8 (*)     Estimated Avg Glucose 206 (*)     All other components within normal limits    Narrative:     Add on per Levar Fletcher MD 04/03/2023  11:22 [id number]    Add on trop per MD ALCANTARA [facility] order [id number]  09:11  04/03/2023   ADD ON PHOS PER PREETHI RODRIGUEZ RN PER LEVAR FLETCHER MD ORDER#   [id number] @  04/03/2023  10:56    ISTAT PROCEDURE - Abnormal; Notable for the following components:    POC PH 7.307 (*)     POC PCO2 48.4 (*)     POC PO2 19 (*)     POC SATURATED O2 24 (*)     All other components within normal limits   ISTAT PROCEDURE - Abnormal; Notable for the following components:    POC Glucose 336 (*)     All other components within normal limits   BETA - HYDROXYBUTYRATE, SERUM   TROPONIN I   TROPONIN I    Narrative:     Add on trop per MD ALCANTARA [facility] order [id number]  09:11  04/03/2023    APTT   PROTIME-INR   PHOSPHORUS   HEMOGLOBIN A1C   PHOSPHORUS     Narrative:     Add on trop per MD AL[facility] order [id number]  09:11  04/03/2023   ADD ON PHOS PER PREETHI RODRIGUEZ, RN PER LEVAR SHEN MD ORDER#   [id number] @  04/03/2023  10:56    ISTAT CHEM8   POCT GLUCOSE MONITORING CONTINUOUS        ECG Results              EKG 12-lead (Final result)  Result time 04/03/23 08:54:43      Final result by Interface, Lab In [facility] (04/03/23 08:54:43)                   Narrative:    Test Reason : R10.9,    Vent. Rate : 068 BPM     Atrial Rate : 068 BPM     P-R Int : 134 ms          QRS Dur : 080 ms      QT Int : 414 ms       P-R-T Axes : 070 065 079 degrees     QTc Int : 440 ms    Normal sinus rhythm  Normal ECG  When compared with ECG of 28-MAY-2022 01:12,  No significant change was found  Confirmed by Tutu HOFF MD ([id number]) on 4/3/2023 8:54:29 AM    Referred By: [email or web address]   SELF           Confirmed By:Tutu HOFF MD                                  Imaging Results              XR NG/OG tube placement check, non-radiologist performed (Final result)  Result time 04/03/23 11:06:46   Procedure changed from XR Gastric tube check, non-radiologist performed     Final result by Preethi Diez MD (04/03/23 11:06:46)                   Impression:      As above      Electronically signed by: Preethi Diez MD  Date:    04/03/2023  Time:    11:06               Narrative:    EXAMINATION:  XR NG/OG TUBE PLACEMENT CHECK, NON-RADIOLOGIST PERFORMED    CLINICAL HISTORY:  tube placement;    TECHNIQUE:  One view    COMPARISON:  No prior recent conventional radiographic studies of the abdomen are available for comparison purposes, though reference is made to an abdominal/pelvic CT examination performed earlier in the day on 04/03/2023.    FINDINGS:  Enteric tube is observed, with the tip and distal side port of this tube lying in the body of the stomach.  Intestinal gas pattern appears unremarkable, with the majority of the bowel gas seen to lie within the colon.  No significant gaseous distention of  large or small bowel loops is seen, though note is made of the fact that the CT examination referenced above demonstrated some distended fluid- filled loops of small bowel and colon.  No conventional radiographic evidence of organomegaly or intra-abdominal/pelvic soft tissue mass.  Opacification of the upper renal collecting systems bilaterally is incidentally noted, related to intravenous contrast administered for the CT examination referenced above.  Visualized lower lung zones appear clear.                                        CT Abdomen Pelvis With Contrast (Final result)  Result time 04/03/23 10:24:37      Final result by Jerson Echeverria MD (04/03/23 10:24:37)                   Impression:      1. Distended loops of small bowel in the mid abdomen with area concerning for possible transition point.  Finding concerning for small bowel obstruction.  2. Distended loop of colon in the right upper quadrant without definite transition point.  3. Stable enhancing lesion in the right hepatic dome, nonspecific and possibly representing flash filling hemangioma.  4. Additional findings, as above.  This report was flagged in Epic as abnormal.    This critical information above was relayed by Danay Timmons MD  via secure chat to Adama Coats on 04/03/2023 at 09:57.    Electronically signed by resident: Danay Timmons  Date:    04/03/2023  Time:    09:42    Electronically signed by: Jerson Echeverria MD  Date:    04/03/2023  Time:    10:24               Narrative:    EXAMINATION:  CT ABDOMEN PELVIS WITH CONTRAST    CLINICAL HISTORY:  Abdominal pain, acute, nonlocalized;    TECHNIQUE:  Routine axial CT images of the abdomen were obtained after administration 75cc of IV Omnipaque 350.  15 cc of oral Omnipaque 350 was administered.Coronal and Sagittal reformatted images were also obtained.    COMPARISON:  CT abdomen pelvis without contrast 11/27/2022 and CT abdomen pelvis with contrast 02/10/2020..    FINDINGS:  Heart: Normal in size with  no pericardial effusion.    Lungs: No consolidation or pleural effusion.  Bilateral bandlike opacifications in the lung bases favored to represent scarring or subsegmental atelectasis.    Liver: Normal in size and attenuation.  Right hepatic lobe dome enhancing focus measuring 0.7 cm (axial series 2, image 23), previously 0.6 cm on CT from 02/10/2022, nonspecific and likely flash filling hemangioma.    Gallbladder/biliary system: The gallbladder is surgically absent.  No intrahepatic or extrahepatic ductal dilatation.    Pancreas: No mass or peripancreatic fat stranding.  Generalized atrophy of the pancreatic parenchyma.    Spleen: Upper normal size.  Probable splenule.    GI Tract/ Mesentery: Small hiatal hernia.  The stomach is unremarkable.  Postop change of partial bowel resections and transverse partial colectomy.  Multiple fluid and air-filled loops of large and small bowel.  Dilation of portion of the colon in the right upper quadrant measuring 7.4 cm in diameter without definite transition point.  Dilated loop of small bowel in the mid abdomen measuring up to 5.7 cm in diameter.  Sharply tapering diameter of the bowel just distal to the dilated loop of small bowel concerning for transition point suggestive of small-bowel obstruction (coronal series 601, image 52).  Of note several of the loops of bowel are closely applied to the anterior abdominal wall with surrounding soft tissue thickening anteriorly, suggestive of adhesions.  The appendix is not definite visualized and likely surgically absent.  Diffuse mesenteric fat stranding and scattered prominent mesenteric lymph nodes, similar appearance to most recent CT from 11/27/2022.    Adrenals: Unremarkable.    Kidneys/ Ureters: Normal in size and location.  Bilateral subcentimeter renal hypodensities, too small to characterize.  Mild fullness of the right pelvicaliceal system.  No hydronephrosis or nephrolithiasis. Both ureters are normal in course caliber  with no ureteral stones or hydroureter.    Bladder: Normal in contour with no bladder wall thickening.    Reproductive organs: The uterus is surgically absent.    Retroperitoneum: No significant adenopathy.    Peritoneal space: No ascites. No free air.    Abdominal wall: Postoperative change of abdominal incision.  Mild body wall edema, similar to prior.  Tiny fat containing umbilical hernia.    Vasculature: Moderate calcific atherosclerosis of the abdominal aorta.  No evidence of aneurysmal dilatation.  The SMA, celiac artery, BRAYDEN, and bilateral renal arteries are patent.  The portal vein, SMV, and splenic vein are patent.    Bones: Degenerative disease of the spine with no acute fractures or lytic lesions.  Grade 1 anterolisthesis of L4 on L5.  Grade 2 anterolisthesis of L5 on S1 with bilateral pars defects of L5.                                       Medications   LIDOcaine (PF) 10 mg/ml (1%) injection 10 mg (has no administration in time range)   sodium chloride 0.9% flush 10 mL (has no administration in time range)   ondansetron disintegrating tablet 8 mg (has no administration in time range)   enoxaparin injection 40 mg (has no administration in time range)   morphine injection 1 mg (has no administration in time range)   morphine injection 2 mg (2 mg Intravenous Given 4/3/23 1414)   insulin aspart U-100 pen 0-5 Units (has no administration in time range)   glucagon (human recombinant) injection 1 mg (has no administration in time range)   dextrose 10% bolus 125 mL 125 mL (has no administration in time range)   dextrose 10% bolus 250 mL 250 mL (has no administration in time range)   lactated ringers infusion ( Intravenous New Bag 4/3/23 1319)   levothyroxine injection 35 mcg (has no administration in time range)   pantoprazole injection 40 mg (has no administration in time range)   morphine injection 4 mg (4 mg Intravenous Given 4/3/23 0837)   ondansetron disintegrating tablet 4 mg (4 mg Oral Given 4/3/23  0754)   morphine injection 4 mg (4 mg Intravenous Given 4/3/23 0913)   iohexoL (OMNIPAQUE 350) injection 75 mL (75 mLs Intravenous Given 4/3/23 0936)   iohexoL (OMNIPAQUE 350) injection 15 mL (15 mLs Oral Given 4/3/23 0900)     Medical Decision Making:   History:   Old Medical Records: I decided to obtain old medical records.  Old Records Summarized: records from clinic visits and records from previous admission(s).       <> Summary of Records: Prior records reviewed for past medical history and current medications  Initial Assessment:   78-year-old female in mild distress due to abdominal discomfort.  Given the patient's history of multiple surgeries, small-bowel obstruction and adhesions, we will image with oral and IV contrast.  I treated the patient's pain with morphine  Differential Diagnosis:   SBO, IBS, constipation, DKA doubt appendiceal or gallbladder pathology given lack of tenderness on exam  Independently Interpreted Test(s):   I have ordered and independently interpreted EKG Reading(s) - see summary below       <> Summary of EKG Reading(s): NSR, all intervals WNL, no STEMI  Clinical Tests:   Lab Tests: Reviewed  Radiological Study: Reviewed  Medical Tests: Reviewed  ED Management:  Labs were not consistent with DKA, aside from hyperglycemia were otherwise unremarkable.     CT scan showed likely small bowel obstruction, I discussed the patient with surgery who admitted the patient for further management.           ED Course as of 04/03/23 1547   Mon Apr 03, 2023   0833 POC Creatinine: 0.9 [BP]   0835 POC Glucose(!): 336 [BP]   0913 Beta-Hydroxybutyrate: 0.2 [BP]   0913 CO2: 23 [BP]   0938 Troponin I: <0.006 [BP]      ED Course User Index  [BP] Adama Coats MD            I have reviewed and concur with the resident's history, physical, assessment, and plan.  I have personally interviewed and examined the patient at bedside.          Clinical Impression:   Final diagnoses:  [R10.9] Abdominal  pain  [K56.609] Small bowel obstruction (Primary)        ED Disposition Condition    Admit Stable                Adama Coats MD  Resident  04/03/23 1547       Danay Adam MD  04/13/23 0900

## 2023-04-03 NOTE — ED TRIAGE NOTES
Anayeli Judd, a 78 y.o. female presents to the ED w/ complaint of abdominal pain 10/10. Per EMS Glu was 482. Pt reports nausea, urinary frequency, and increased thirst. Denies CP, SOB, fever.     Triage note:  Chief Complaint   Patient presents with    Abdominal Pain     Hx of colon ca. Pt reports chronic abdominal pain.      Review of patient's allergies indicates:   Allergen Reactions    Codeine Itching and Nausea And Vomiting    Dilaudid [hydromorphone (bulk)] Other (See Comments)     Oversedating, head burning. Pt prefers to avoid.       Percocet [oxycodone-acetaminophen] Itching    Sulfa (sulfonamide antibiotics) Itching and Nausea And Vomiting           Latex, natural rubber Rash     Past Medical History:   Diagnosis Date    Abdominal pain     Allergic rhinitis     Arthritis     Blood platelet disorder     Blood platelet disorder     Blood transfusion     during delivery and     Bowel obstruction     Cervical radiculopathy     followed by dr cloud    Colon cancer     transverse colon; resected; Stage IIA (pT3 pN0 MX)    Degenerative joint disease (DJD) of lumbar spine     Diabetes mellitus     Diarrhea     Falls 2020    Family history of breast cancer     Family history of colon cancer     Fatty liver     GERD (gastroesophageal reflux disease)     History of shingles     Hyperlipidemia     Hypomagnesemia     Hypothyroidism     Irritable bowel syndrome     Microscopic colitis     treated     Migraine     Myelodysplastic syndrome     Raynaud phenomenon     Raynaud's disease     Squamous cell carcinoma of skin     Type 2 diabetes mellitus

## 2023-04-03 NOTE — PLAN OF CARE
Pt a&o4, all safety precautions in place, call light in reach at all times, bed alarm is on, NG to LIWS, continuous fluids running, pt has been ambulating to BR and back with assist of 1, pain has been manageable with medications provided, VS have been stable, no other concerns at this time.   Problem: Adult Inpatient Plan of Care  Goal: Plan of Care Review  Outcome: Ongoing, Progressing  Goal: Patient-Specific Goal (Individualized)  Outcome: Ongoing, Progressing  Goal: Absence of Hospital-Acquired Illness or Injury  Outcome: Ongoing, Progressing  Goal: Optimal Comfort and Wellbeing  Outcome: Ongoing, Progressing  Goal: Readiness for Transition of Care  Outcome: Ongoing, Progressing     Problem: Diabetes Comorbidity  Goal: Blood Glucose Level Within Targeted Range  Outcome: Ongoing, Progressing     Problem: Fall Injury Risk  Goal: Absence of Fall and Fall-Related Injury  Outcome: Ongoing, Progressing

## 2023-04-03 NOTE — CONSULTS
[facility] - Emergency Dept  General Surgery  Consult Note    Inpatient consult to General Surgery  Consult performed by: Ashley Rogers MD  Consult ordered by: Adama Coats MD      Subjective:     Chief Complaint/Reason for Admission: Small bowel obstruction    History of Present Illness: The patient is an 78 year old female w/ a history of numerous abdominal surgeries and prior small bowel obstructions as well as colon cancer s/p resection in 2011 who presents to the ED for evaluation of diffuse abdominal pain, nausea, vomiting, and bloating which began early this morning. She states that her symptoms are similar to her prior small bowel obstructions. She has not passed flatus for the passed two days and has felt bloated. She did have a very small bowel movement this morning which she described as looking like jelly. She has had at least 5 prior SBOs in the past for which she required an ex lap with TIFFANY in 2017 and another ex lap that was aborted in 3/2022 for her last SBO. Her last colonoscopy was in 2017.    No current facility-administered medications on file prior to encounter.     Current Outpatient Medications on File Prior to Encounter   Medication Sig    acetaminophen (TYLENOL) 650 MG TbSR Take 1,300 mg by mouth 2 (two) times a day.    ascorbic acid, vitamin C, (VITAMIN C) 1000 MG tablet Take 1,000 mg by mouth once daily.    atorvastatin (LIPITOR) 40 MG tablet Take 40 mg by mouth once daily.    biotin 10,000 mcg TbDL Take 1 tablet by mouth once daily.     calcium-vitamin D3 (OS-KASSANDRA 500 + D3) 500 mg-5 mcg (200 unit) per tablet Take 1 tablet by mouth once daily.    conjugated estrogens (PREMARIN) vaginal cream INSERT 0.5 GRAM VAGINALLY 2 TIMES A WEEK    cranberry extract 200 mg Cap Take 1 capsule by mouth once daily.    donepeziL (ARICEPT) 10 MG tablet Take 1 tablet (10 mg total) by mouth every evening.    DULoxetine (CYMBALTA) 30 MG capsule TAKE 1 CAPSULE BY MOUTH  TWICE DAILY (Patient taking differently:  Take 30 mg by mouth 2 (two) times daily.)    ferrous sulfate 324 mg (65 mg iron) TbEC Take 1 tablet (324 mg total) by mouth once daily.    flash glucose scanning reader (FREESTYLE EFREN 14 DAY READER) Misc Check blood glucose level 3 times daily.    flash glucose sensor (FREESTYLE EFREN 14 DAY SENSOR) Kit 1 application by Misc.(Non-Drug; Combo Route) route every 14 (fourteen) days. Disp 30 or 90 day refill    HYDROcodone-acetaminophen (NORCO) 5-325 mg per tablet Take 1 tablet by mouth every 8 (eight) hours as needed for Pain.    hydrocortisone 2.5 % cream Apply topically 2 (two) times daily as needed.    levothyroxine (SYNTHROID) 75 MCG tablet TAKE 1 TABLET(75 MCG) BY MOUTH BEFORE BREAKFAST    levothyroxine (SYNTHROID) 75 MCG tablet Take 1 tablet (75 mcg total) by mouth before breakfast.    LIDOcaine (LIDODERM) 5 % Apply 1 to 3 patches to back daily. Remove & Discard patches within 12 hours.    magnesium glycinate 100 mg Tab Take 500 mg by mouth once daily at 6am.    metFORMIN (GLUCOPHAGE) 500 MG tablet Take 1 tablet (500 mg total) by mouth 2 (two) times daily with meals.    montelukast (SINGULAIR) 10 mg tablet TAKE 1 TABLET BY MOUTH  DAILY    ondansetron (ZOFRAN-ODT) 8 MG TbDL Take 1 tablet (8 mg total) by mouth every 8 (eight) hours as needed (nausea).    pantoprazole (PROTONIX) 20 MG tablet Take 1 tablet (20 mg total) by mouth once daily.    polyethylene glycol (GLYCOLAX) 17 gram PwPk Take 17 g by mouth once daily.    triamcinolone acetonide 0.1% (KENALOG) 0.1 % paste APPLY TO THE AFFECTED AREA WITH EVERY-TIP THREE TIMES DAILY    valACYclovir (VALTREX) 1000 MG tablet Take 1 tablet (1,000 mg total) by mouth 3 (three) times daily. for 7 days    vitamin D 1000 units Tab Take 1,000 Units by mouth once daily. D3    [DISCONTINUED] hyoscyamine (ANASPAZ,LEVSIN) 0.125 mg Tab TAKE 1 TABLET(125 MCG) BY MOUTH EVERY 8 HOURS AS NEEDED FOR URGENCY       Review of patient's allergies indicates:   Allergen Reactions    Codeine  Itching and Nausea And Vomiting    Dilaudid [hydromorphone (bulk)] Other (See Comments)     Oversedating, head burning. Pt prefers to avoid.       Percocet [oxycodone-acetaminophen] Itching    Sulfa (sulfonamide antibiotics) Itching and Nausea And Vomiting           Latex, natural rubber Rash       Past Medical History:   Diagnosis Date    Abdominal pain     Allergic rhinitis     Arthritis     Blood platelet disorder     Blood platelet disorder     Blood transfusion     during delivery and     Bowel obstruction     Cervical radiculopathy     followed by dr cloud    Colon cancer     transverse colon; resected; Stage IIA (pT3 pN0 MX)    Degenerative joint disease (DJD) of lumbar spine     Diabetes mellitus     Diarrhea     Falls 2020    Family history of breast cancer     Family history of colon cancer     Fatty liver     GERD (gastroesophageal reflux disease)     History of shingles     Hyperlipidemia     Hypomagnesemia     Hypothyroidism     Irritable bowel syndrome     Microscopic colitis     treated     Migraine     Myelodysplastic syndrome     Raynaud phenomenon     Raynaud's disease     Squamous cell carcinoma of skin     Type 2 diabetes mellitus      Past Surgical History:   Procedure Laterality Date    ADENOIDECTOMY      APPENDECTOMY      BACK SURGERY      CARPAL TUNNEL RELEASE      bilateral      SECTION      CHOLECYSTECTOMY  1965    COLECTOMY  2011    Transverse colon resection by Dr. Aguirre    COLONOSCOPY N/A 2017    Procedure: COLONOSCOPY;  Surgeon: Manjit Alvarez MD;  [facility]  Service: Endoscopy;  Laterality: N/A;    COLONOSCOPY N/A 2019    Procedure: COLONOSCOPY;  Surgeon: Ramiro Jefferson MD;  Location: [facility] (4TH FLR);  Service: Endoscopy;  Laterality: N/A;  PM prep    COLONOSCOPY N/A 2022    Procedure: COLONOSCOPY;  Surgeon: Ramiro Jefferson MD;  Location: [facility] (2ND FLR);  Service: Endoscopy;  Laterality: N/A;  EGD  and colonoscopy in 6-8 weeks from now     states has constipation. -extended Golytely prep    CYSTOSCOPY N/A 11/09/2021    Procedure: CYSTOSCOPY;  Surgeon: Viridiana Valenzuela MD;  Location: [facility] OR 1ST FLR;  Service: Urology;  Laterality: N/A;    ENDOSCOPIC ULTRASOUND OF UPPER GASTROINTESTINAL TRACT N/A 12/12/2018    Procedure: ULTRASOUND, UPPER GI TRACT, ENDOSCOPIC;  Surgeon: Jose Hess MD;  Location: [facility];  Service: Endoscopy;  Laterality: N/A;    ENDOSCOPIC ULTRASOUND OF UPPER GASTROINTESTINAL TRACT N/A 01/23/2019    Procedure: ULTRASOUND, UPPER GI TRACT, ENDOSCOPIC;  Surgeon: Jose Hess MD;  Location: [facility] (2ND FLR);  Service: Endoscopy;  Laterality: N/A;    ESOPHAGOGASTRODUODENOSCOPY N/A 11/16/2018    Procedure: EGD (ESOPHAGOGASTRODUODENOSCOPY);  Surgeon: Angelo Reynolds MD;  Location: [facility] (2ND FLR);  Service: Endoscopy;  Laterality: N/A;    ESOPHAGOGASTRODUODENOSCOPY N/A 06/26/2019    Procedure: EGD (ESOPHAGOGASTRODUODENOSCOPY);  Surgeon: Ramiro Jefferson MD;  Location: [facility] (4TH FLR);  Service: Endoscopy;  Laterality: N/A;    ESOPHAGOGASTRODUODENOSCOPY N/A 5/4/2022    Procedure: EGD (ESOPHAGOGASTRODUODENOSCOPY);  Surgeon: Ramiro Jefferson MD;  Location: [facility] (2ND FLR);  Service: Endoscopy;  Laterality: N/A;  fully vaccinated-GT    EYE SURGERY      Cataract Removal    FLUOROSCOPIC URODYNAMIC STUDY N/A 11/09/2021    Procedure: URODYNAMIC STUDY, FLUOROSCOPIC;  Surgeon: Viridiana Valenzuela MD;  Location: [facility] OR 1ST FLR;  Service: Urology;  Laterality: N/A;  90 minutes     HYSTERECTOMY      JOINT REPLACEMENT      posterolateral fusion with autograft bone and Eun mineralized bone matrix  02/01/2013    at [facility] for lumbar spine stenosis    SMALL INTESTINE SURGERY      SPINE SURGERY      TOE SURGERY      TONSILLECTOMY      TRANSFORAMINAL EPIDURAL INJECTION OF STEROID Bilateral 12/21/2022    Procedure: Injection,steroid,epidural, Todd L5/S1 TF CONTRAST DIRECT REFERRAL;   Surgeon: Toni Osorio MD;  Location: [facility] PAIN MGT;  Service: Pain Management;  Laterality: Bilateral;    TRIGGER FINGER RELEASE       Family History       Problem Relation (Age of Onset)    Alcohol abuse Brother, Brother, Brother    Arthritis Daughter, Brother, Brother    Asthma Daughter, Daughter    Bladder Cancer Mother    Breast cancer Sister (79)    Cataracts Mother    Colon cancer Father, Brother (70)    Depression Daughter, Daughter, Daughter    Glaucoma Mother    Heart disease Father (50), Mother    Hyperlipidemia Mother    Hypertension Daughter    Kidney disease Mother    Parkinsonism Brother    Stroke Daughter (40), Daughter          Tobacco Use    Smoking status: Never    Smokeless tobacco: Never   Substance and Sexual Activity    Alcohol use: Not Currently     Comment: socially, rarely    Drug use: Never    Sexual activity: Not Currently     Review of Systems  Objective:     Vital Signs (Most Recent):  Temp: 98.3 °F (36.8 °C) (04/03/23 1023)  Pulse: 71 (04/03/23 1023)  Resp: 18 (04/03/23 1023)  BP: (!) 111/54 (04/03/23 1023)  SpO2: 100 % (04/03/23 1023)   Vital Signs (24h Range):  Temp:  [98.2 °F (36.8 °C)-98.3 °F (36.8 °C)] 98.3 °F (36.8 °C)  Pulse:  [66-78] 71  Resp:  [14-18] 18  SpO2:  [97 %-100 %] 100 %  BP: (111-158)/(54-73) 111/54     Weight: 61.2 kg (135 lb)  Body mass index is 24.69 kg/m².    No intake or output data in the 24 hours ending 04/03/23 1102    Physical Exam  General: Appears comfortable, in no acute distress  Pulm: Normal work of breathing  Abd: Soft, moderately distended and tympanic. Mild tenderness to palpation diffusely. No rebound or guarding. Multiple well  healed surgical scars.   Extremities: Warm and well perfused  Neuro: Appropriately conversant    Significant Labs:  CBC:   Recent Labs   Lab 04/03/23  0820 04/03/23  0828   WBC 9.73  --    RBC 4.08  --    HGB 13.4  --    HCT 40.9 42   *  --    *  --    MCH 32.8*  --    MCHC 32.8  --      CMP:   Recent Labs    Lab 04/03/23  0820   *   CALCIUM 10.6*   ALBUMIN 4.3   PROT 8.0      K 4.2   CO2 23      BUN 11   CREATININE 1.0   ALKPHOS 71   ALT 39   AST 38   BILITOT 1.2*       Significant Diagnostics:  CT: I have reviewed all pertinent results/findings within the past 24 hours. Impression:    1. Distended loops of small bowel in the mid abdomen with area concerning for possible transition point.  Finding concerning for small bowel obstruction.  2. Distended loop of colon in the right upper quadrant without definite transition point.  3. Stable enhancing lesion in the right hepatic dome, nonspecific and possibly representing flash filling hemangioma.  4. Additional findings, as above.  This report was flagged in Epic as abnormal.    Assessment/Plan:     The patient is an 78 year old female with an extensive abdominal surgical history and multiple SBOs in the past now with a recurrent SBO likely secondary to adhesive disease. She remains hemodynamically normal and well appearing, no acute indications for surgery at this time.    - NPO, mIVFs  - NGT to low intermittent wall suction  - Gastrografin challenge tomorrow     D/W Dr. Fletcher, Attending      Thank you for your consult. I will follow-up with patient. Please contact us if you have any additional questions.    Ashley Rogers MD  General Surgery  [facility] - Emergency Dept        I have personally performed a detailed history and physical examination on this patient. My findings are summarized in the resident's note included in the record.   NG decompression and gastrograffin challenge

## 2023-04-04 LAB
ANION GAP SERPL CALC-SCNC: 8 MMOL/L (ref 8–16)
BASOPHILS # BLD AUTO: 0.03 K/UL (ref 0–0.2)
BASOPHILS NFR BLD: 0.6 % (ref 0–1.9)
BUN SERPL-MCNC: 7 MG/DL (ref 8–23)
CALCIUM SERPL-MCNC: 9 MG/DL (ref 8.7–10.5)
CHLORIDE SERPL-SCNC: 104 MMOL/L (ref 95–110)
CO2 SERPL-SCNC: 24 MMOL/L (ref 23–29)
CREAT SERPL-MCNC: 0.8 MG/DL (ref 0.5–1.4)
DIFFERENTIAL METHOD: ABNORMAL
EOSINOPHIL # BLD AUTO: 0.2 K/UL (ref 0–0.5)
EOSINOPHIL NFR BLD: 3.2 % (ref 0–8)
ERYTHROCYTE [DISTWIDTH] IN BLOOD BY AUTOMATED COUNT: 14.2 % (ref 11.5–14.5)
EST. GFR  (NO RACE VARIABLE): >60 ML/MIN/1.73 M^2
GLUCOSE SERPL-MCNC: 219 MG/DL (ref 70–110)
HCT VFR BLD AUTO: 32.8 % (ref 37–48.5)
HGB BLD-MCNC: 10.8 G/DL (ref 12–16)
IMM GRANULOCYTES # BLD AUTO: 0.01 K/UL (ref 0–0.04)
IMM GRANULOCYTES NFR BLD AUTO: 0.2 % (ref 0–0.5)
LYMPHOCYTES # BLD AUTO: 1.4 K/UL (ref 1–4.8)
LYMPHOCYTES NFR BLD: 25.8 % (ref 18–48)
MAGNESIUM SERPL-MCNC: 1.9 MG/DL (ref 1.6–2.6)
MCH RBC QN AUTO: 33.4 PG (ref 27–31)
MCHC RBC AUTO-ENTMCNC: 32.9 G/DL (ref 32–36)
MCV RBC AUTO: 102 FL (ref 82–98)
MONOCYTES # BLD AUTO: 0.6 K/UL (ref 0.3–1)
MONOCYTES NFR BLD: 10.5 % (ref 4–15)
NEUTROPHILS # BLD AUTO: 3.1 K/UL (ref 1.8–7.7)
NEUTROPHILS NFR BLD: 59.7 % (ref 38–73)
NRBC BLD-RTO: 0 /100 WBC
PHOSPHATE SERPL-MCNC: 3 MG/DL (ref 2.7–4.5)
PLATELET # BLD AUTO: 78 K/UL (ref 150–450)
PMV BLD AUTO: 11.1 FL (ref 9.2–12.9)
POCT GLUCOSE: 149 MG/DL (ref 70–110)
POCT GLUCOSE: 150 MG/DL (ref 70–110)
POCT GLUCOSE: 186 MG/DL (ref 70–110)
POCT GLUCOSE: 216 MG/DL (ref 70–110)
POCT GLUCOSE: 223 MG/DL (ref 70–110)
POTASSIUM SERPL-SCNC: 3.8 MMOL/L (ref 3.5–5.1)
RBC # BLD AUTO: 3.23 M/UL (ref 4–5.4)
SODIUM SERPL-SCNC: 136 MMOL/L (ref 136–145)
WBC # BLD AUTO: 5.24 K/UL (ref 3.9–12.7)

## 2023-04-04 PROCEDURE — 76937 US GUIDE VASCULAR ACCESS: CPT

## 2023-04-04 PROCEDURE — 85025 COMPLETE CBC W/AUTO DIFF WBC: CPT

## 2023-04-04 PROCEDURE — 36410 VNPNXR 3YR/> PHY/QHP DX/THER: CPT

## 2023-04-04 PROCEDURE — 99223 PR INITIAL HOSPITAL CARE,LEVL III: ICD-10-PCS | Mod: AI,,, | Performed by: SURGERY

## 2023-04-04 PROCEDURE — 84100 ASSAY OF PHOSPHORUS: CPT

## 2023-04-04 PROCEDURE — 20600001 HC STEP DOWN PRIVATE ROOM

## 2023-04-04 PROCEDURE — 99222 PR INITIAL HOSPITAL CARE,LEVL II: ICD-10-PCS | Mod: ,,, | Performed by: PHYSICIAN ASSISTANT

## 2023-04-04 PROCEDURE — 80048 BASIC METABOLIC PNL TOTAL CA: CPT

## 2023-04-04 PROCEDURE — 63600175 PHARM REV CODE 636 W HCPCS

## 2023-04-04 PROCEDURE — C1751 CATH, INF, PER/CENT/MIDLINE: HCPCS

## 2023-04-04 PROCEDURE — 36415 COLL VENOUS BLD VENIPUNCTURE: CPT

## 2023-04-04 PROCEDURE — 99223 1ST HOSP IP/OBS HIGH 75: CPT | Mod: AI,,, | Performed by: SURGERY

## 2023-04-04 PROCEDURE — 83735 ASSAY OF MAGNESIUM: CPT

## 2023-04-04 PROCEDURE — C9113 INJ PANTOPRAZOLE SODIUM, VIA: HCPCS | Performed by: STUDENT IN AN ORGANIZED HEALTH CARE EDUCATION/TRAINING PROGRAM

## 2023-04-04 PROCEDURE — 25500020 PHARM REV CODE 255: Performed by: STUDENT IN AN ORGANIZED HEALTH CARE EDUCATION/TRAINING PROGRAM

## 2023-04-04 PROCEDURE — 99499 NO LOS: ICD-10-PCS | Mod: ,,, | Performed by: STUDENT IN AN ORGANIZED HEALTH CARE EDUCATION/TRAINING PROGRAM

## 2023-04-04 PROCEDURE — 63600175 PHARM REV CODE 636 W HCPCS: Performed by: STUDENT IN AN ORGANIZED HEALTH CARE EDUCATION/TRAINING PROGRAM

## 2023-04-04 PROCEDURE — 99499 UNLISTED E&M SERVICE: CPT | Mod: ,,, | Performed by: STUDENT IN AN ORGANIZED HEALTH CARE EDUCATION/TRAINING PROGRAM

## 2023-04-04 PROCEDURE — 25000003 PHARM REV CODE 250: Performed by: STUDENT IN AN ORGANIZED HEALTH CARE EDUCATION/TRAINING PROGRAM

## 2023-04-04 PROCEDURE — 99222 1ST HOSP IP/OBS MODERATE 55: CPT | Mod: ,,, | Performed by: PHYSICIAN ASSISTANT

## 2023-04-04 PROCEDURE — 25000003 PHARM REV CODE 250

## 2023-04-04 RX ORDER — MORPHINE SULFATE 2 MG/ML
1 INJECTION, SOLUTION INTRAMUSCULAR; INTRAVENOUS ONCE
Status: COMPLETED | OUTPATIENT
Start: 2023-04-04 | End: 2023-04-04

## 2023-04-04 RX ORDER — ACETAMINOPHEN 500 MG
500 TABLET ORAL EVERY 8 HOURS
Status: DISCONTINUED | OUTPATIENT
Start: 2023-04-04 | End: 2023-04-05 | Stop reason: HOSPADM

## 2023-04-04 RX ORDER — HYDROCODONE BITARTRATE AND ACETAMINOPHEN 5; 325 MG/1; MG/1
1 TABLET ORAL EVERY 6 HOURS PRN
Status: DISCONTINUED | OUTPATIENT
Start: 2023-04-04 | End: 2023-04-05 | Stop reason: HOSPADM

## 2023-04-04 RX ORDER — LEVOTHYROXINE SODIUM 75 UG/1
75 TABLET ORAL
Status: DISCONTINUED | OUTPATIENT
Start: 2023-04-05 | End: 2023-04-04

## 2023-04-04 RX ORDER — ATORVASTATIN CALCIUM 40 MG/1
40 TABLET, FILM COATED ORAL DAILY
Status: DISCONTINUED | OUTPATIENT
Start: 2023-04-05 | End: 2023-04-05 | Stop reason: HOSPADM

## 2023-04-04 RX ORDER — DONEPEZIL HYDROCHLORIDE 5 MG/1
10 TABLET, FILM COATED ORAL NIGHTLY
Status: DISCONTINUED | OUTPATIENT
Start: 2023-04-04 | End: 2023-04-05 | Stop reason: HOSPADM

## 2023-04-04 RX ORDER — DULOXETIN HYDROCHLORIDE 30 MG/1
30 CAPSULE, DELAYED RELEASE ORAL 2 TIMES DAILY
Status: DISCONTINUED | OUTPATIENT
Start: 2023-04-04 | End: 2023-04-05 | Stop reason: HOSPADM

## 2023-04-04 RX ORDER — POLYETHYLENE GLYCOL 3350 17 G/17G
17 POWDER, FOR SOLUTION ORAL DAILY
Status: DISCONTINUED | OUTPATIENT
Start: 2023-04-05 | End: 2023-04-05 | Stop reason: HOSPADM

## 2023-04-04 RX ADMIN — SODIUM CHLORIDE, POTASSIUM CHLORIDE, SODIUM LACTATE AND CALCIUM CHLORIDE: 600; 310; 30; 20 INJECTION, SOLUTION INTRAVENOUS at 11:04

## 2023-04-04 RX ADMIN — DONEPEZIL HYDROCHLORIDE 10 MG: 5 TABLET, FILM COATED ORAL at 09:04

## 2023-04-04 RX ADMIN — ENOXAPARIN SODIUM 40 MG: 40 INJECTION SUBCUTANEOUS at 06:04

## 2023-04-04 RX ADMIN — MORPHINE SULFATE 2 MG: 2 INJECTION, SOLUTION INTRAMUSCULAR; INTRAVENOUS at 12:04

## 2023-04-04 RX ADMIN — MORPHINE SULFATE 1 MG: 2 INJECTION, SOLUTION INTRAMUSCULAR; INTRAVENOUS at 01:04

## 2023-04-04 RX ADMIN — HYDROCODONE BITARTRATE AND ACETAMINOPHEN 1 TABLET: 5; 325 TABLET ORAL at 06:04

## 2023-04-04 RX ADMIN — LEVOTHYROXINE SODIUM 35 MCG: 20 INJECTION, SOLUTION INTRAVENOUS at 11:04

## 2023-04-04 RX ADMIN — DIATRIZOATE MEGLUMINE AND DIATRIZOATE SODIUM 100 ML: 660; 100 LIQUID ORAL; RECTAL at 10:04

## 2023-04-04 RX ADMIN — SODIUM CHLORIDE, POTASSIUM CHLORIDE, SODIUM LACTATE AND CALCIUM CHLORIDE: 600; 310; 30; 20 INJECTION, SOLUTION INTRAVENOUS at 09:04

## 2023-04-04 RX ADMIN — DULOXETINE 30 MG: 30 CAPSULE, DELAYED RELEASE ORAL at 09:04

## 2023-04-04 RX ADMIN — ACETAMINOPHEN 500 MG: 500 TABLET ORAL at 09:04

## 2023-04-04 RX ADMIN — INSULIN ASPART 2 UNITS: 100 INJECTION, SOLUTION INTRAVENOUS; SUBCUTANEOUS at 10:04

## 2023-04-04 RX ADMIN — PANTOPRAZOLE SODIUM 40 MG: 40 INJECTION, POWDER, FOR SOLUTION INTRAVENOUS at 10:04

## 2023-04-04 NOTE — PLAN OF CARE
[facility]  Initial Discharge Assessment       Primary Care Provider: Darci Guthrie MD    Admission Diagnosis: Abdominal pain [R10.9]    Admission Date: 4/3/2023  Expected Discharge Date: 4/7/2023    Discharge Barriers Identified: None    Payor: MEDICARE / Plan: MEDICARE PART A & B / Product Type: Government /     Extended Emergency Contact Information  Primary Emergency Contact: Danielle Whitley   [place]  Mobile Phone: [phone]  Relation: Daughter  Secondary Emergency Contact: Kimberly Horner  Address: [address] [address] [facility]  Home Phone: [phone]  Relation: Daughter    Discharge Plan A: Home Health  Discharge Plan B: Home      [id number] - [place], [place] - [address] [place] [address] AT [facility]  [address] [facility] [address]  Phone: [phone] Fax: [phone]    [facility] ([facility] - [facility] [address]  [address]  [address]  [facility] [address]  [facility] [address]  Phone: [phone] Fax: [phone]      Initial Assessment (most recent)       Adult Discharge Assessment - 04/04/23 1536          Discharge Assessment    Assessment Type Discharge Planning Brief Assessment     Confirmed/corrected address, phone number and insurance Yes     Confirmed Demographics Correct on Facesheet     Source of Information health record     When was your last doctors appointment? 03/07/23     Does patient/caregiver understand observation status Yes     Communicated LOI with patient/caregiver Yes     Reason For Admission Abdominal pain     People in Home alone     Facility Arrived From: Home     Do you expect to return to your current living situation? No     Do you have help at home or someone to help you manage your care at home? Yes     Who are your caregiver(s) and their phone number(s)? Danielle-[phone]     Prior to hospitilization cognitive status:  Alert/Oriented     Current cognitive status: Alert/Oriented     Home Layout Able to live on 1st floor     Equipment Currently Used at Home none     Readmission within 30 days? No     Patient currently being followed by outpatient case management? No     Do you currently have service(s) that help you manage your care at home? Yes     How Many hours does patient receive services 12     Name and Contact number of agency Private sitter     Is the pt/caregiver preference to resume services with current agency Yes     Do you take prescription medications? Yes     Do you have prescription coverage? Yes     Coverage Medicare     Do you have any problems affording any of your prescribed medications? No     Is the patient taking medications as prescribed? yes     Who is going to help you get home at discharge? Danielle-[phone]     How do you get to doctors appointments? family or friend will provide     Are you on dialysis? No     Do you take coumadin? No     Discharge Plan A Home Health     Discharge Plan B Home     DME Needed Upon Discharge  none     Discharge Plan discussed with: Patient     Discharge Barriers Identified None        Physical Activity    On average, how many days per week do you engage in moderate to strenuous exercise (like a brisk walk)? Patient refused     On average, how many minutes do you engage in exercise at this level? Patient refused        Financial Resource Strain    How hard is it for you to pay for the very basics like food, housing, medical care, and heating? Patient refused        Housing Stability    In the last 12 months, was there a time when you were not able to pay the mortgage or rent on time? Patient refused     In the last 12 months, was there a time when you did not have a steady place to sleep or slept in a shelter (including now)? Patient refused        Transportation Needs    In the past 12 months, has lack of transportation kept you from medical appointments or from  getting medications? Patient refused     In the past 12 months, has lack of transportation kept you from meetings, work, or from getting things needed for daily living? Patient refused        Food Insecurity    Within the past 12 months, you worried that your food would run out before you got the money to buy more. Patient refused     Within the past 12 months, the food you bought just didn't last and you didn't have money to get more. Patient refused        Stress    Do you feel stress - tense, restless, nervous, or anxious, or unable to sleep at night because your mind is troubled all the time - these days? Patient refused        Social Connections    In a typical week, how many times do you talk on the phone with family, friends, or neighbors? Patient refused     How often do you get together with friends or relatives? Patient refused     How often do you attend [employer] or [employer] services? Patient refused     Do you belong to any clubs or organizations such as Baptist groups, unions, fraternal or athletic groups, or school groups? Patient refused     How often do you attend meetings of the clubs or organizations you belong to? Patient refused     Are you , , , , never , or living with a partner? Patient refused        Alcohol Use    Q1: How often do you have a drink containing alcohol? Patient refused     Q2: How many drinks containing alcohol do you have on a typical day when you are drinking? Patient refused     Q3: How often do you have six or more drinks on one occasion? Patient refused                   Spoke to pt. Pt lives at home with Danielle-[phone]. Post hospital  stay sitters will be pt support person and pt. has transportation at d/c with family. There have been no hospitalizations within the last 30 days per pt and pt daughter. Verified pt PCP and preferred pharmacy. Pt stated not on Coumadin and is not receiving dialysis. All questions answered  regarding case management/ discharge planning , pt verbalized understanding. Discharge booklet with SW contact information given to pt.     Zehra Noonan LCSW  Case Management/[facility]  [phone]

## 2023-04-04 NOTE — CONSULTS
Single lumen 18g x 10 cm midline placed to left brachial vein. Max dwell date 5/3/23, Lot# [id number].  Needle advanced into the vessel under real time ultrasound guidance.  Image recorded and saved.

## 2023-04-04 NOTE — ASSESSMENT & PLAN NOTE
Endocrinology consulted for BG management.   BG goal 140-180  T2DM.  NPO for SBO.    - Novolog (Insulin Aspart) prn for BG excursions LDC SSI (150/50)  - BG checks q6hr  - Hypoglycemia protocol in place    ** Please notify Endocrine for any change and/or advance in diet**  ** Please call Endocrine for any BG related issues **    Discharge Planning:   TBD. Please notify endocrinology prior to discharge.

## 2023-04-04 NOTE — SUBJECTIVE & OBJECTIVE
Interval History: NAEON. Afebrile. HDS. Reports feeling better this morning. Passing flatus, no BM. Pain is controlled with current regimen. NGT minimal output. Adequate UOP. No new symptoms or concerns this morning. All questions answered.   AM labs pending    Medications:  Continuous Infusions:   lactated ringers 100 mL/hr at 04/03/23 2336     Scheduled Meds:   enoxaparin  40 mg Subcutaneous Daily    levothyroxine  35 mcg Intravenous Daily    pantoprazole  40 mg Intravenous Daily     PRN Meds:dextrose 10%, dextrose 10%, glucagon (human recombinant), insulin aspart U-100, LIDOcaine (PF) 10 mg/ml (1%), morphine, morphine, ondansetron, sodium chloride 0.9%     Review of patient's allergies indicates:   Allergen Reactions    Codeine Itching and Nausea And Vomiting    Dilaudid [hydromorphone (bulk)] Other (See Comments)     Oversedating, head burning. Pt prefers to avoid.       Percocet [oxycodone-acetaminophen] Itching    Sulfa (sulfonamide antibiotics) Itching and Nausea And Vomiting           Latex, natural rubber Rash     Objective:     Vital Signs (Most Recent):  Temp: 98.6 °F (37 °C) (04/04/23 0335)  Pulse: 73 (04/04/23 0335)  Resp: 16 (04/04/23 0335)  BP: (!) 101/51 (04/04/23 0335)  SpO2: (!) 93 % (04/04/23 0335)   Vital Signs (24h Range):  Temp:  [98.2 °F (36.8 °C)-98.6 °F (37 °C)] 98.6 °F (37 °C)  Pulse:  [66-80] 73  Resp:  [14-20] 16  SpO2:  [93 %-100 %] 93 %  BP: (101-158)/(51-70) 101/51     Weight: 68 kg (149 lb 14.6 oz)  Body mass index is 27.42 kg/m².    Intake/Output - Last 3 Shifts         04/02 0700  04/03 0659 04/03 0700 04/04 0659 04/04 0700  04/05 0659    I.V. (mL/kg)  678.7 (10)     Total Intake(mL/kg)  678.7 (10)     Urine (mL/kg/hr)  0 (0)     Other  25     Total Output  25     Net  +653.7            Urine Occurrence  3 x     Stool Occurrence  0 x             Physical Exam  Vitals and nursing note reviewed.   Constitutional:       General: She is not in acute distress.     Appearance: She is not  diaphoretic.      Comments: Room air  NGT to ELLIOT   HENT:      Head: Normocephalic and atraumatic.      Mouth/Throat:      Mouth: Mucous membranes are moist.      Pharynx: Oropharynx is clear.   Eyes:      Extraocular Movements: Extraocular movements intact.      Conjunctiva/sclera: Conjunctivae normal.   Cardiovascular:      Rate and Rhythm: Normal rate.   Pulmonary:      Effort: Pulmonary effort is normal. No respiratory distress.   Abdominal:      General: There is distension.      Palpations: Abdomen is soft.      Tenderness: There is no guarding or rebound.      Comments: Well healed surgical scars noted  Mildly distended but soft. Mild TTP diffusely. No peritonitic signs    Musculoskeletal:         General: No deformity.      Cervical back: Normal range of motion.   Skin:     General: Skin is warm and dry.   Neurological:      Mental Status: She is alert and oriented to person, place, and time.       Significant Labs:  I have reviewed all pertinent lab results within the past 24 hours.    Significant Diagnostics:  I have reviewed all pertinent imaging results/findings within the past 24 hours.

## 2023-04-04 NOTE — ASSESSMENT & PLAN NOTE
- BG elevated  - Endocrinology consulted, appreciate assistance  - SSI   - POCT glucose checks  - Hypoglycemia protocol  - Holding home meds in setting of acute disease process

## 2023-04-04 NOTE — CONSULTS
[facility] - [facility]  Endocrinology  Diabetes Consult Note    Consult Requested by: Herman Fletcher MD   Reason for admit: SBO (small bowel obstruction)    HISTORY OF PRESENT ILLNESS:  Reason for Consult: Management of T2DM, Hyperglycemia     Diabetes diagnosis year: Over 5 years ago    Home Diabetes Medications:  Metformin 500 mg BID    How often checking glucose at home?  Willian on infrequently    BG readings on regimen: 200-400  Hypoglycemia on the regimen?  No  Missed doses on regimen?  No    Diabetes Complications include:     Hyperglycemia    Complicating diabetes co morbidities:   HTN; HLD; SBO      HPI: The patient is an 78 year old female w/ a history of numerous abdominal surgeries and prior small bowel obstructions as well as colon cancer s/p resection in 2011 who presents to the ED for evaluation of diffuse abdominal pain, nausea, vomiting, and bloating which began early this morning. Endocrine conuslted to manage hyperglycemia and type 2 diabetes.     Lab Results   Component Value Date    HGBA1C 8.8 (H) 04/03/2023                Interval HPI:   Overnight events: No acute events overnight. Patient in room 1042/1042 A. Blood glucose stable. BG above goal on current insulin regimen (SSI ). Steroid use- None.    Renal function- Normal   Vasopressors-  None         Diet NPO     Eating:   NPO  Nausea: No  Hypoglycemia and intervention: No  Fever: No  TPN and/or TF: No    PMH, PSH, FH, SH updated and reviewed     ROS:  Review of Systems   Constitutional:  Negative for unexpected weight change.   Eyes:  Negative for visual disturbance.   Respiratory:  Negative for cough.    Cardiovascular:  Negative for chest pain.   Gastrointestinal:  Positive for nausea. Negative for vomiting.   Endocrine: Negative for polydipsia and polyuria.   Musculoskeletal:  Negative for back pain.   Skin:  Negative for rash.   Neurological:  Negative for syncope.   Psychiatric/Behavioral:  Negative for agitation and dysphoric mood.       Current Medications and/or Treatments Impacting Glycemic Control  Immunotherapy:    Immunosuppressants       None          Steroids:   Hormones (From admission, onward)      None          Pressors:    Autonomic Drugs (From admission, onward)      None          Hyperglycemia/Diabetes Medications:   Antihyperglycemics (From admission, onward)      Start     Stop Route Frequency Ordered    04/03/23 1211  insulin aspart U-100 pen 0-5 Units         -- SubQ Every 6 hours PRN 04/03/23 1112             PHYSICAL EXAMINATION:  Vitals:    04/04/23 0335   BP: (!) 101/51   Pulse: 73   Resp: 16   Temp: 98.6 °F (37 °C)     Body mass index is 27.42 kg/m².    Physical Exam  Constitutional:       General: She is not in acute distress.     Appearance: Normal appearance. She is not ill-appearing.   HENT:      Head: Normocephalic and atraumatic.      Right Ear: External ear normal.      Left Ear: External ear normal.      Nose: Nose normal.      Comments: NGT  Cardiovascular:      Rate and Rhythm: Normal rate and regular rhythm.      Heart sounds: No murmur heard.  Pulmonary:      Effort: Pulmonary effort is normal. No respiratory distress.   Abdominal:      General: There is no distension.      Tenderness: There is abdominal tenderness (RLQ).   Musculoskeletal:         General: No swelling.      Right lower leg: No edema.      Left lower leg: No edema.   Skin:     Findings: No erythema.   Neurological:      General: No focal deficit present.      Mental Status: She is alert and oriented to person, place, and time.   Psychiatric:         Mood and Affect: Mood normal.         Behavior: Behavior normal.         Labs Reviewed and Include   Recent Labs   Lab 04/04/23  0547   *   CALCIUM 9.0      K 3.8   CO2 24      BUN 7*   CREATININE 0.8     Lab Results   Component Value Date    WBC 5.24 04/04/2023    HGB 10.8 (L) 04/04/2023    HCT 32.8 (L) 04/04/2023     (H) 04/04/2023    PLT 78 (L) 04/04/2023     No results  for input(s): TSH, FREET4 in the last 168 hours.  Lab Results   Component Value Date    HGBA1C 8.8 (H) 04/03/2023       Nutritional status:   Body mass index is 27.42 kg/m².  Lab Results   Component Value Date    ALBUMIN 4.3 04/03/2023    ALBUMIN 2.9 (L) 11/30/2022    ALBUMIN 3.2 (L) 11/29/2022     Lab Results   Component Value Date    PREALBUMIN 13 (L) 04/20/2017    PREALBUMIN 14 (L) 04/17/2017    PREALBUMIN 9 (L) 04/13/2017       Estimated Creatinine Clearance: 50.7 mL/min (based on SCr of 0.8 mg/dL).    Accu-Checks  Recent Labs     04/03/23  1757 04/03/23  2327 04/04/23  0547 04/04/23  0809 04/04/23  1219   POCTGLUCOSE 175* 185* 223* 216* 186*        ASSESSMENT and PLAN    Endocrine  Type 2 diabetes mellitus without complication, without long-term current use of insulin  Endocrinology consulted for BG management.   BG goal 140-180  T2DM.  NPO for SBO.    - Novolog (Insulin Aspart) prn for BG excursions LDC SSI (150/50)  - BG checks q6hr  - Hypoglycemia protocol in place    ** Please notify Endocrine for any change and/or advance in diet**  ** Please call Endocrine for any BG related issues **    Discharge Planning:   TBD. Please notify endocrinology prior to discharge.        GI  * SBO (small bowel obstruction)  Managed per primary team  Avoid hypoglycemia        GERD (gastroesophageal reflux disease)  Managed by primary team.  On PPI          Plan discussed with patient, family, and RN at bedside.     Ritesh Yu PA-C  Endocrinology  [facility]

## 2023-04-04 NOTE — PROGRESS NOTES
[facility] - [facility]  General Surgery  Progress Note    Subjective:     History of Present Illness:  No notes on file    Post-Op Info:  * No surgery found *         Interval History: NAEON. Afebrile. HDS. Reports feeling better this morning. Passing flatus, no BM. Pain is controlled with current regimen. NGT minimal output. Adequate UOP. No new symptoms or concerns this morning. All questions answered.   AM labs pending    Medications:  Continuous Infusions:   lactated ringers 100 mL/hr at 04/03/23 2336     Scheduled Meds:   enoxaparin  40 mg Subcutaneous Daily    levothyroxine  35 mcg Intravenous Daily    pantoprazole  40 mg Intravenous Daily     PRN Meds:dextrose 10%, dextrose 10%, glucagon (human recombinant), insulin aspart U-100, LIDOcaine (PF) 10 mg/ml (1%), morphine, morphine, ondansetron, sodium chloride 0.9%     Review of patient's allergies indicates:   Allergen Reactions    Codeine Itching and Nausea And Vomiting    Dilaudid [hydromorphone (bulk)] Other (See Comments)     Oversedating, head burning. Pt prefers to avoid.       Percocet [oxycodone-acetaminophen] Itching    Sulfa (sulfonamide antibiotics) Itching and Nausea And Vomiting           Latex, natural rubber Rash     Objective:     Vital Signs (Most Recent):  Temp: 98.6 °F (37 °C) (04/04/23 0335)  Pulse: 73 (04/04/23 0335)  Resp: 16 (04/04/23 0335)  BP: (!) 101/51 (04/04/23 0335)  SpO2: (!) 93 % (04/04/23 0335)   Vital Signs (24h Range):  Temp:  [98.2 °F (36.8 °C)-98.6 °F (37 °C)] 98.6 °F (37 °C)  Pulse:  [66-80] 73  Resp:  [14-20] 16  SpO2:  [93 %-100 %] 93 %  BP: (101-158)/(51-70) 101/51     Weight: 68 kg (149 lb 14.6 oz)  Body mass index is 27.42 kg/m².    Intake/Output - Last 3 Shifts         04/02 0700 04/03 0659 04/03 0700 04/04 0659 04/04 0700  04/05 0659    I.V. (mL/kg)  678.7 (10)     Total Intake(mL/kg)  678.7 (10)     Urine (mL/kg/hr)  0 (0)     Other  25     Total Output  25     Net  +653.7            Urine Occurrence  3 x      Stool Occurrence  0 x             Physical Exam  Vitals and nursing note reviewed.   Constitutional:       General: She is not in acute distress.     Appearance: She is not diaphoretic.      Comments: Room air  NGT to LIWS   HENT:      Head: Normocephalic and atraumatic.      Mouth/Throat:      Mouth: Mucous membranes are moist.      Pharynx: Oropharynx is clear.   Eyes:      Extraocular Movements: Extraocular movements intact.      Conjunctiva/sclera: Conjunctivae normal.   Cardiovascular:      Rate and Rhythm: Normal rate.   Pulmonary:      Effort: Pulmonary effort is normal. No respiratory distress.   Abdominal:      General: There is distension.      Palpations: Abdomen is soft.      Tenderness: There is no guarding or rebound.      Comments: Well healed surgical scars noted  Mildly distended but soft. Mild TTP diffusely. No peritonitic signs    Musculoskeletal:         General: No deformity.      Cervical back: Normal range of motion.   Skin:     General: Skin is warm and dry.   Neurological:      Mental Status: She is alert and oriented to person, place, and time.       Significant Labs:  I have reviewed all pertinent lab results within the past 24 hours.    Significant Diagnostics:  I have reviewed all pertinent imaging results/findings within the past 24 hours.    Assessment/Plan:     * SBO (small bowel obstruction)  Patient is an 78 year old female with h/o colon cancer (s/p resection, 2011), multiple abdominal surgeries, DMII, recurrent SBO, hypothyroidism, GERD, Parkinsonism, IBS, chronic back pain who presents to the ED with concern for SBO. Clinically stable    - NPO  - NGT to LIWS  - Gastrograffin challenge today with KUBs at 4 and 8 hours  - IVF  - PRN pain and nausea medications  - Daily labs  - Replete electrolytes PRN  - DVT prophylaxis (SCDs and lovenox)  - OOB, ambulate    Dispo: not yet medically stable for dc      Type 2 diabetes mellitus without complication, without long-term current use of  insulin  - BG elevated  - Endocrinology consulted, appreciate assistance  - SSI   - POCT glucose checks  - Hypoglycemia protocol  - Holding home meds in setting of acute disease process    GERD (gastroesophageal reflux disease)  - Home PPI converted to IV while NPO    Acquired hypothyroidism  - Home synthroid converted to IV while NPO     Case discussed with Dr. Fletcher.       Canelo Grover PA-C  General Surgery  [facility]

## 2023-04-04 NOTE — HPI
Reason for Consult: Management of T2DM, Hyperglycemia     Diabetes diagnosis year: Over 5 years ago    Home Diabetes Medications:  Metformin 500 mg BID    How often checking glucose at home?  Willian on infrequently    BG readings on regimen: 200-400  Hypoglycemia on the regimen?  No  Missed doses on regimen?  No    Diabetes Complications include:     Hyperglycemia    Complicating diabetes co morbidities:   HTN; HLD; SBO      HPI: The patient is an 78 year old female w/ a history of numerous abdominal surgeries and prior small bowel obstructions as well as colon cancer s/p resection in 2011 who presents to the ED for evaluation of diffuse abdominal pain, nausea, vomiting, and bloating which began early this morning. Endocrine conuslted to manage hyperglycemia and type 2 diabetes.     Lab Results   Component Value Date    HGBA1C 8.8 (H) 04/03/2023

## 2023-04-04 NOTE — PLAN OF CARE
Problem: Adult Inpatient Plan of Care  Goal: Plan of Care Review  Outcome: Ongoing, Progressing     Problem: Diabetes Comorbidity  Goal: Blood Glucose Level Within Targeted Range  Outcome: Ongoing, Progressing     Problem: Fall Injury Risk  Goal: Absence of Fall and Fall-Related Injury  Outcome: Ongoing, Progressing     POC reviewed with patient this shift.  A/O x4.  Respirations unlabored.  Skin w/d.  Continent of b/b.  Up to restroom with x1 standby/min assist.  NPO status maintained with NGT to low intermittent suction.  Minimal output noted this shift.  CBG monitoring in progress with no s/s of hypo/hyperglycemia noted.  VSS.  See flowsheet for full assessment.  Able to verbalize wants/needs.  No c/o pain or discomfort at this time.  Fall/safety precautions maintained.

## 2023-04-04 NOTE — ASSESSMENT & PLAN NOTE
Patient is an 78 year old female with h/o colon cancer (s/p resection, 2011), multiple abdominal surgeries, DMII, recurrent SBO, hypothyroidism, GERD, Parkinsonism, IBS, chronic back pain who presents to the ED with concern for SBO. Clinically stable    - NPO  - NGT to LIWS  - Gastrograffin challenge today with KUBs at 4 and 8 hours  - IVF  - PRN pain and nausea medications  - Daily labs  - Replete electrolytes PRN  - DVT prophylaxis (SCDs and lovenox)  - OOB, ambulate    Dispo: not yet medically stable for dc

## 2023-04-04 NOTE — ASSESSMENT & PLAN NOTE
Lab Results   Component Value Date    TSH 7.426 (H) 12/08/2022    [id number] 146 04/01/2008    Y5ENUJK 7.9 05/01/2013    FREET4 0.88 12/08/2022     On Levothyroxine 75 mcg QD.

## 2023-04-04 NOTE — SUBJECTIVE & OBJECTIVE
Interval HPI:   Overnight events: No acute events overnight. Patient in room 1042/1042 A. Blood glucose stable. BG above goal on current insulin regimen (SSI ). Steroid use- None.    Renal function- Normal   Vasopressors-  None         Diet NPO     Eating:   NPO  Nausea: No  Hypoglycemia and intervention: No  Fever: No  TPN and/or TF: No    PMH, PSH, FH, SH updated and reviewed     ROS:  Review of Systems   Constitutional:  Negative for unexpected weight change.   Eyes:  Negative for visual disturbance.   Respiratory:  Negative for cough.    Cardiovascular:  Negative for chest pain.   Gastrointestinal:  Positive for nausea. Negative for vomiting.   Endocrine: Negative for polydipsia and polyuria.   Musculoskeletal:  Negative for back pain.   Skin:  Negative for rash.   Neurological:  Negative for syncope.   Psychiatric/Behavioral:  Negative for agitation and dysphoric mood.      Current Medications and/or Treatments Impacting Glycemic Control  Immunotherapy:    Immunosuppressants       None          Steroids:   Hormones (From admission, onward)      None          Pressors:    Autonomic Drugs (From admission, onward)      None          Hyperglycemia/Diabetes Medications:   Antihyperglycemics (From admission, onward)      Start     Stop Route Frequency Ordered    04/03/23 1211  insulin aspart U-100 pen 0-5 Units         -- SubQ Every 6 hours PRN 04/03/23 1112             PHYSICAL EXAMINATION:  Vitals:    04/04/23 0335   BP: (!) 101/51   Pulse: 73   Resp: 16   Temp: 98.6 °F (37 °C)     Body mass index is 27.42 kg/m².    Physical Exam  Constitutional:       General: She is not in acute distress.     Appearance: Normal appearance. She is not ill-appearing.   HENT:      Head: Normocephalic and atraumatic.      Right Ear: External ear normal.      Left Ear: External ear normal.      Nose: Nose normal.      Comments: NGT  Cardiovascular:      Rate and Rhythm: Normal rate and regular rhythm.      Heart sounds: No murmur  heard.  Pulmonary:      Effort: Pulmonary effort is normal. No respiratory distress.   Abdominal:      General: There is no distension.      Tenderness: There is abdominal tenderness (RLQ).   Musculoskeletal:         General: No swelling.      Right lower leg: No edema.      Left lower leg: No edema.   Skin:     Findings: No erythema.   Neurological:      General: No focal deficit present.      Mental Status: She is alert and oriented to person, place, and time.   Psychiatric:         Mood and Affect: Mood normal.         Behavior: Behavior normal.

## 2023-04-05 ENCOUNTER — TELEPHONE (OUTPATIENT)
Dept: INTERNAL MEDICINE | Facility: CLINIC | Age: 78
End: 2023-04-05
Payer: MEDICARE

## 2023-04-05 VITALS
HEART RATE: 58 BPM | WEIGHT: 149.94 LBS | DIASTOLIC BLOOD PRESSURE: 53 MMHG | RESPIRATION RATE: 18 BRPM | SYSTOLIC BLOOD PRESSURE: 110 MMHG | OXYGEN SATURATION: 96 % | BODY MASS INDEX: 27.59 KG/M2 | TEMPERATURE: 98 F | HEIGHT: 62 IN

## 2023-04-05 DIAGNOSIS — K56.609 INTESTINAL OBSTRUCTION, UNSPECIFIED CAUSE, UNSPECIFIED WHETHER PARTIAL OR COMPLETE: ICD-10-CM

## 2023-04-05 DIAGNOSIS — R53.81 PHYSICAL DECONDITIONING: Primary | ICD-10-CM

## 2023-04-05 LAB
ANION GAP SERPL CALC-SCNC: 7 MMOL/L (ref 8–16)
BACTERIA UR CULT: ABNORMAL
BASOPHILS # BLD AUTO: 0.02 K/UL (ref 0–0.2)
BASOPHILS NFR BLD: 0.5 % (ref 0–1.9)
BUN SERPL-MCNC: 6 MG/DL (ref 8–23)
CALCIUM SERPL-MCNC: 8.7 MG/DL (ref 8.7–10.5)
CHLORIDE SERPL-SCNC: 107 MMOL/L (ref 95–110)
CO2 SERPL-SCNC: 23 MMOL/L (ref 23–29)
CREAT SERPL-MCNC: 0.7 MG/DL (ref 0.5–1.4)
DIFFERENTIAL METHOD: ABNORMAL
EOSINOPHIL # BLD AUTO: 0.2 K/UL (ref 0–0.5)
EOSINOPHIL NFR BLD: 3.6 % (ref 0–8)
ERYTHROCYTE [DISTWIDTH] IN BLOOD BY AUTOMATED COUNT: 14.3 % (ref 11.5–14.5)
EST. GFR  (NO RACE VARIABLE): >60 ML/MIN/1.73 M^2
GLUCOSE SERPL-MCNC: 152 MG/DL (ref 70–110)
HCT VFR BLD AUTO: 31.6 % (ref 37–48.5)
HGB BLD-MCNC: 10.3 G/DL (ref 12–16)
IMM GRANULOCYTES # BLD AUTO: 0.01 K/UL (ref 0–0.04)
IMM GRANULOCYTES NFR BLD AUTO: 0.2 % (ref 0–0.5)
LYMPHOCYTES # BLD AUTO: 1.6 K/UL (ref 1–4.8)
LYMPHOCYTES NFR BLD: 38 % (ref 18–48)
MAGNESIUM SERPL-MCNC: 1.1 MG/DL (ref 1.6–2.6)
MCH RBC QN AUTO: 33.6 PG (ref 27–31)
MCHC RBC AUTO-ENTMCNC: 32.6 G/DL (ref 32–36)
MCV RBC AUTO: 103 FL (ref 82–98)
MONOCYTES # BLD AUTO: 0.6 K/UL (ref 0.3–1)
MONOCYTES NFR BLD: 13.3 % (ref 4–15)
NEUTROPHILS # BLD AUTO: 1.8 K/UL (ref 1.8–7.7)
NEUTROPHILS NFR BLD: 44.4 % (ref 38–73)
NRBC BLD-RTO: 0 /100 WBC
PHOSPHATE SERPL-MCNC: 3 MG/DL (ref 2.7–4.5)
PLATELET # BLD AUTO: 69 K/UL (ref 150–450)
PMV BLD AUTO: 10.4 FL (ref 9.2–12.9)
POCT GLUCOSE: 164 MG/DL (ref 70–110)
POCT GLUCOSE: 213 MG/DL (ref 70–110)
POCT GLUCOSE: 230 MG/DL (ref 70–110)
POTASSIUM SERPL-SCNC: 3.4 MMOL/L (ref 3.5–5.1)
RBC # BLD AUTO: 3.07 M/UL (ref 4–5.4)
SODIUM SERPL-SCNC: 137 MMOL/L (ref 136–145)
WBC # BLD AUTO: 4.13 K/UL (ref 3.9–12.7)

## 2023-04-05 PROCEDURE — 99232 SBSQ HOSP IP/OBS MODERATE 35: CPT | Mod: ,,, | Performed by: PHYSICIAN ASSISTANT

## 2023-04-05 PROCEDURE — 80048 BASIC METABOLIC PNL TOTAL CA: CPT

## 2023-04-05 PROCEDURE — 36415 COLL VENOUS BLD VENIPUNCTURE: CPT

## 2023-04-05 PROCEDURE — 84100 ASSAY OF PHOSPHORUS: CPT

## 2023-04-05 PROCEDURE — 25000003 PHARM REV CODE 250

## 2023-04-05 PROCEDURE — 83735 ASSAY OF MAGNESIUM: CPT

## 2023-04-05 PROCEDURE — 63600175 PHARM REV CODE 636 W HCPCS

## 2023-04-05 PROCEDURE — 63600175 PHARM REV CODE 636 W HCPCS: Performed by: PHYSICIAN ASSISTANT

## 2023-04-05 PROCEDURE — 25000003 PHARM REV CODE 250: Performed by: STUDENT IN AN ORGANIZED HEALTH CARE EDUCATION/TRAINING PROGRAM

## 2023-04-05 PROCEDURE — 99232 PR SUBSEQUENT HOSPITAL CARE,LEVL II: ICD-10-PCS | Mod: ,,, | Performed by: PHYSICIAN ASSISTANT

## 2023-04-05 PROCEDURE — 94761 N-INVAS EAR/PLS OXIMETRY MLT: CPT

## 2023-04-05 PROCEDURE — 85025 COMPLETE CBC W/AUTO DIFF WBC: CPT

## 2023-04-05 RX ORDER — DEXTROSE 40 %
15 GEL (GRAM) ORAL
Status: DISCONTINUED | OUTPATIENT
Start: 2023-04-05 | End: 2023-04-05 | Stop reason: HOSPADM

## 2023-04-05 RX ORDER — DEXTROSE 40 %
30 GEL (GRAM) ORAL
Status: DISCONTINUED | OUTPATIENT
Start: 2023-04-05 | End: 2023-04-05 | Stop reason: HOSPADM

## 2023-04-05 RX ORDER — INSULIN ASPART 100 [IU]/ML
0-5 INJECTION, SOLUTION INTRAVENOUS; SUBCUTANEOUS
Status: DISCONTINUED | OUTPATIENT
Start: 2023-04-05 | End: 2023-04-05 | Stop reason: HOSPADM

## 2023-04-05 RX ORDER — GLUCAGON 1 MG
1 KIT INJECTION
Status: DISCONTINUED | OUTPATIENT
Start: 2023-04-05 | End: 2023-04-05 | Stop reason: HOSPADM

## 2023-04-05 RX ORDER — INSULIN ASPART 100 [IU]/ML
2 INJECTION, SOLUTION INTRAVENOUS; SUBCUTANEOUS
Status: DISCONTINUED | OUTPATIENT
Start: 2023-04-05 | End: 2023-04-05 | Stop reason: HOSPADM

## 2023-04-05 RX ORDER — PANTOPRAZOLE SODIUM 40 MG/1
40 TABLET, DELAYED RELEASE ORAL DAILY
Status: DISCONTINUED | OUTPATIENT
Start: 2023-04-05 | End: 2023-04-05 | Stop reason: HOSPADM

## 2023-04-05 RX ORDER — LEVOTHYROXINE SODIUM 75 UG/1
75 TABLET ORAL
Status: DISCONTINUED | OUTPATIENT
Start: 2023-04-05 | End: 2023-04-05 | Stop reason: HOSPADM

## 2023-04-05 RX ADMIN — HYDROCODONE BITARTRATE AND ACETAMINOPHEN 1 TABLET: 5; 325 TABLET ORAL at 01:04

## 2023-04-05 RX ADMIN — INSULIN ASPART 2 UNITS: 100 INJECTION, SOLUTION INTRAVENOUS; SUBCUTANEOUS at 11:04

## 2023-04-05 RX ADMIN — ACETAMINOPHEN 500 MG: 500 TABLET ORAL at 03:04

## 2023-04-05 RX ADMIN — HYDROCODONE BITARTRATE AND ACETAMINOPHEN 1 TABLET: 5; 325 TABLET ORAL at 11:04

## 2023-04-05 RX ADMIN — ATORVASTATIN CALCIUM 40 MG: 40 TABLET, FILM COATED ORAL at 10:04

## 2023-04-05 RX ADMIN — HYDROCODONE BITARTRATE AND ACETAMINOPHEN 1 TABLET: 5; 325 TABLET ORAL at 06:04

## 2023-04-05 RX ADMIN — LEVOTHYROXINE SODIUM 75 MCG: 75 TABLET ORAL at 10:04

## 2023-04-05 RX ADMIN — DULOXETINE 30 MG: 30 CAPSULE, DELAYED RELEASE ORAL at 10:04

## 2023-04-05 RX ADMIN — INSULIN ASPART 2 UNITS: 100 INJECTION, SOLUTION INTRAVENOUS; SUBCUTANEOUS at 06:04

## 2023-04-05 RX ADMIN — ACETAMINOPHEN 500 MG: 500 TABLET ORAL at 06:04

## 2023-04-05 RX ADMIN — PANTOPRAZOLE SODIUM 40 MG: 40 TABLET, DELAYED RELEASE ORAL at 10:04

## 2023-04-05 RX ADMIN — SODIUM CHLORIDE, POTASSIUM CHLORIDE, SODIUM LACTATE AND CALCIUM CHLORIDE: 600; 310; 30; 20 INJECTION, SOLUTION INTRAVENOUS at 06:04

## 2023-04-05 NOTE — PHYSICIAN QUERY
PT Name: Anayeli Judd  MR #: [id number]     DOCUMENTATION CLARIFICATION     CDS/: Ritika Ohara RN, CDS               Contact information: [email or web address]    This form is a permanent document in the medical record.     Query Date: April 5, 2023    By submitting this query, we are merely seeking further clarification of documentation to reflect the severity of illness of your patient. Please utilize your independent clinical judgment when addressing the question(s) below.    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   Surgery Consult 4/3    Surgery Consult 4/3    x Radiology findings Impression:     1. Distended loops of small bowel in the mid abdomen with area concerning for possible transition point.  Finding concerning for small bowel obstruction.  2. Distended loop of colon in the right upper quadrant without definite transition point.   CT Abdomen 4/3   x Treatment/Medication - NPO, mIVFs  - NGT to low intermittent wall suction  - Gastrografin challenge tomorrow     Pain improved  Having bowel movements Surgery Consult 4/3        Surgery PN 4/5     Procedure/Surgery     x Other 78 year old female w/ a history of numerous abdominal surgeries and prior small bowel obstructions as well as colon cancer s/p resection in 2011 who presents to the ED for evaluation of diffuse abdominal pain, nausea, vomiting, and bloating which began early this morning. She states that her symptoms are similar to her prior small bowel obstructions. She has not passed flatus for the passed two days and has felt bloated. She did have a very small bowel movement this morning which she described as looking like jelly. She has had at least 5 prior SBOs  in the past for which she required an ex lap with TIFFANY in 2017 and another ex lap that was aborted in 3/2022 for her last SBO Surgery Consult 4/3      Provider, please further specify the bowel obstruction diagnosis:  [  x ] Partial or incomplete intestinal obstruction, due to adhesions   [   ] Partial or incomplete intestinal obstruction, due to other (please specify): ____________   [   ] Partial or incomplete intestinal obstruction, unknown or unspecified etiology   [   ] Complete intestinal obstruction, due to adhesions   [   ] Complete intestinal obstruction, due to other (please specify): ____________   [   ] Complete intestinal obstruction, unknown or unspecified etiology   [   ] Other intestinal condition (please specify): _____________________   [   ] Clinically Undetermined           Please document in your progress notes daily for the duration of treatment until resolved, and include in your discharge summary.

## 2023-04-05 NOTE — PROGRESS NOTES
[facility] - [facility]  General Surgery  Progress Note    Subjective:     History of Present Illness:  No notes on file    Post-Op Info:  * No surgery found *         Interval History:   NAEO  Pain improved  Having bowel movements    Medications:  Continuous Infusions:  Scheduled Meds:   acetaminophen  500 mg Oral Q8H    atorvastatin  40 mg Oral Daily    donepeziL  10 mg Oral QHS    DULoxetine  30 mg Oral BID    enoxaparin  40 mg Subcutaneous Daily    [START ON 4/6/2023] levothyroxine  75 mcg Oral Before breakfast    [START ON 4/6/2023] pantoprazole  40 mg Oral Daily    polyethylene glycol  17 g Oral Daily     PRN Meds:dextrose 10%, dextrose 10%, glucagon (human recombinant), HYDROcodone-acetaminophen, insulin aspart U-100, LIDOcaine (PF) 10 mg/ml (1%), ondansetron, sodium chloride 0.9%     Review of patient's allergies indicates:   Allergen Reactions    Codeine Itching and Nausea And Vomiting    Dilaudid [hydromorphone (bulk)] Other (See Comments)     Oversedating, head burning. Pt prefers to avoid.       Percocet [oxycodone-acetaminophen] Itching    Sulfa (sulfonamide antibiotics) Itching and Nausea And Vomiting           Latex, natural rubber Rash     Objective:     Vital Signs (Most Recent):  Temp: 97.5 °F (36.4 °C) (04/05/23 0825)  Pulse: 86 (04/05/23 0825)  Resp: 18 (04/05/23 0825)  BP: 109/75 (04/05/23 0825)  SpO2: 96 % (04/05/23 0825) Vital Signs (24h Range):  Temp:  [97.5 °F (36.4 °C)-98.3 °F (36.8 °C)] 97.5 °F (36.4 °C)  Pulse:  [60-88] 86  Resp:  [16-20] 18  SpO2:  [94 %-97 %] 96 %  BP: ()/(50-75) 109/75     Weight: 68 kg (149 lb 14.6 oz)  Body mass index is 27.42 kg/m².    Intake/Output - Last 3 Shifts         04/03 0700  04/04 0659 04/04 0700 04/05 0659 04/05 0700 04/06 0659    P.O.  360     I.V. (mL/kg) 678.7 (10) 0 (0) 1200 (17.6)    Total Intake(mL/kg) 678.7 (10) 360 (5.3) 1200 (17.6)    Urine (mL/kg/hr) 0 (0)      Other 25      Total Output 25      Net +653.7 +360 +1200            Urine Occurrence 3 x 3 x     Stool Occurrence 0 x 8 x             Physical Exam  Vitals and nursing note reviewed.   Constitutional:       General: She is not in acute distress.     Appearance: She is not diaphoretic.      Comments: Room air   HENT:      Head: Normocephalic and atraumatic.      Mouth/Throat:      Mouth: Mucous membranes are moist.      Pharynx: Oropharynx is clear.   Eyes:      Extraocular Movements: Extraocular movements intact.      Conjunctiva/sclera: Conjunctivae normal.   Cardiovascular:      Rate and Rhythm: Normal rate.   Pulmonary:      Effort: Pulmonary effort is normal. No respiratory distress.   Abdominal:      Palpations: Abdomen is soft.      Tenderness: There is no guarding or rebound.      Comments: Well healed surgical scars noted  Soft. Improved distention. nontender   Musculoskeletal:         General: No deformity.      Cervical back: Normal range of motion.   Skin:     General: Skin is warm and dry.   Neurological:      Mental Status: She is alert and oriented to person, place, and time.       Significant Labs:  I have reviewed all pertinent lab results within the past 24 hours.  CBC:   Recent Labs   Lab 04/05/23  0333   WBC 4.13   RBC 3.07*   HGB 10.3*   HCT 31.6*   PLT 69*   *   MCH 33.6*   MCHC 32.6       Significant Diagnostics:  I have reviewed all pertinent imaging results/findings within the past 24 hours.    Assessment/Plan:     * SBO (small bowel obstruction)  Patient is an 78 year old female with h/o colon cancer (s/p resection, 2011), multiple abdominal surgeries, DMII, recurrent SBO, hypothyroidism, GERD, Parkinsonism, IBS, chronic back pain who presents to the ED with concern for SBO. Clinically stable    - Diabetic diet  - DC IVF  - PRN pain and nausea medications  - Daily labs  - Replete electrolytes PRN  - DVT prophylaxis (SCDs and lovenox)  - OOB, ambulate    Dispo: Home today if tolerating PO      Type 2 diabetes mellitus without complication, without  long-term current use of insulin  - BG elevated  - Endocrinology consulted, appreciate assistance  - SSI   - POCT glucose checks  - Hypoglycemia protocol  - Holding home meds in setting of acute disease process    GERD (gastroesophageal reflux disease)  - Home PPI    Acquired hypothyroidism  - Home synthroid         Manjit Jiang MD  General Surgery  [facility]

## 2023-04-05 NOTE — SUBJECTIVE & OBJECTIVE
Interval History:   NAEO  Pain improved  Having bowel movements    Medications:  Continuous Infusions:  Scheduled Meds:   acetaminophen  500 mg Oral Q8H    atorvastatin  40 mg Oral Daily    donepeziL  10 mg Oral QHS    DULoxetine  30 mg Oral BID    enoxaparin  40 mg Subcutaneous Daily    [START ON 4/6/2023] levothyroxine  75 mcg Oral Before breakfast    [START ON 4/6/2023] pantoprazole  40 mg Oral Daily    polyethylene glycol  17 g Oral Daily     PRN Meds:dextrose 10%, dextrose 10%, glucagon (human recombinant), HYDROcodone-acetaminophen, insulin aspart U-100, LIDOcaine (PF) 10 mg/ml (1%), ondansetron, sodium chloride 0.9%     Review of patient's allergies indicates:   Allergen Reactions    Codeine Itching and Nausea And Vomiting    Dilaudid [hydromorphone (bulk)] Other (See Comments)     Oversedating, head burning. Pt prefers to avoid.       Percocet [oxycodone-acetaminophen] Itching    Sulfa (sulfonamide antibiotics) Itching and Nausea And Vomiting           Latex, natural rubber Rash     Objective:     Vital Signs (Most Recent):  Temp: 97.5 °F (36.4 °C) (04/05/23 0825)  Pulse: 86 (04/05/23 0825)  Resp: 18 (04/05/23 0825)  BP: 109/75 (04/05/23 0825)  SpO2: 96 % (04/05/23 0825) Vital Signs (24h Range):  Temp:  [97.5 °F (36.4 °C)-98.3 °F (36.8 °C)] 97.5 °F (36.4 °C)  Pulse:  [60-88] 86  Resp:  [16-20] 18  SpO2:  [94 %-97 %] 96 %  BP: ()/(50-75) 109/75     Weight: 68 kg (149 lb 14.6 oz)  Body mass index is 27.42 kg/m².    Intake/Output - Last 3 Shifts         04/03 0700  04/04 0659 04/04 0700  04/05 0659 04/05 0700  04/06 0659    P.O.  360     I.V. (mL/kg) 678.7 (10) 0 (0) 1200 (17.6)    Total Intake(mL/kg) 678.7 (10) 360 (5.3) 1200 (17.6)    Urine (mL/kg/hr) 0 (0)      Other 25      Total Output 25      Net +653.7 +360 +1200           Urine Occurrence 3 x 3 x     Stool Occurrence 0 x 8 x             Physical Exam  Vitals and nursing note reviewed.   Constitutional:       General: She is not in acute  distress.     Appearance: She is not diaphoretic.      Comments: Room air   HENT:      Head: Normocephalic and atraumatic.      Mouth/Throat:      Mouth: Mucous membranes are moist.      Pharynx: Oropharynx is clear.   Eyes:      Extraocular Movements: Extraocular movements intact.      Conjunctiva/sclera: Conjunctivae normal.   Cardiovascular:      Rate and Rhythm: Normal rate.   Pulmonary:      Effort: Pulmonary effort is normal. No respiratory distress.   Abdominal:      Palpations: Abdomen is soft.      Tenderness: There is no guarding or rebound.      Comments: Well healed surgical scars noted  Soft. Improved distention. nontender   Musculoskeletal:         General: No deformity.      Cervical back: Normal range of motion.   Skin:     General: Skin is warm and dry.   Neurological:      Mental Status: She is alert and oriented to person, place, and time.       Significant Labs:  I have reviewed all pertinent lab results within the past 24 hours.  CBC:   Recent Labs   Lab 04/05/23  0333   WBC 4.13   RBC 3.07*   HGB 10.3*   HCT 31.6*   PLT 69*   *   MCH 33.6*   MCHC 32.6       Significant Diagnostics:  I have reviewed all pertinent imaging results/findings within the past 24 hours.

## 2023-04-05 NOTE — PLAN OF CARE
[facility] - [facility]      HOME HEALTH ORDERS  FACE TO FACE ENCOUNTER    Patient Name: Anayeli Judd  YOB: 1945    PCP: Darci Guthrie MD   PCP Address: [address] / [place] [id number]  PCP Phone Number: [phone]  PCP Fax: [phone]    Encounter Date: 4/3/23    Admit to Home Health    Diagnoses:  Active Hospital Problems    Diagnosis  POA    *SBO (small bowel obstruction) [K56.609]  Yes    Type 2 diabetes mellitus without complication, without long-term current use of insulin [E11.9]  Yes     Chronic    GERD (gastroesophageal reflux disease) [K21.9]  Yes     Chronic    Acquired hypothyroidism [E03.9]  Yes     Chronic      Resolved Hospital Problems   No resolved problems to display.       Follow Up Appointments:  No future appointments.    Allergies:  Review of patient's allergies indicates:   Allergen Reactions    Codeine Itching and Nausea And Vomiting    Dilaudid [hydromorphone (bulk)] Other (See Comments)     Oversedating, head burning. Pt prefers to avoid.       Percocet [oxycodone-acetaminophen] Itching    Sulfa (sulfonamide antibiotics) Itching and Nausea And Vomiting           Latex, natural rubber Rash       Medications: Review discharge medications with patient and family and provide education.    Current Facility-Administered Medications   Medication Dose Route Frequency Provider Last Rate Last Admin    acetaminophen tablet 500 mg  500 mg Oral Q8H Ashley Rogers MD   500 mg at 04/05/23 0625    atorvastatin tablet 40 mg  40 mg Oral Daily Ashley Rogers MD   40 mg at 04/05/23 1011    dextrose 10% bolus 125 mL 125 mL  12.5 g Intravenous PRN Ritesh Yu PA-C        dextrose 10% bolus 250 mL 250 mL  25 g Intravenous PRN Ritesh Yu PA-C        dextrose 40 % gel 15,000 mg  15 g Oral PRN Ritesh Yu PA-C        dextrose 40 % gel 30,000 mg  30 g Oral PRN Ritesh Yu PA-C        donepeziL tablet 10 mg  10 mg Oral QHS Ashley  MD Ken   10 mg at 04/04/23 2126    DULoxetine DR capsule 30 mg  30 mg Oral BID Ashley Rogers MD   30 mg at 04/05/23 1011    enoxaparin injection 40 mg  40 mg Subcutaneous Daily Ashley Rogers MD   40 mg at 04/04/23 1810    glucagon (human recombinant) injection 1 mg  1 mg Intramuscular PRN Ritesh Yu PA-C        HYDROcodone-acetaminophen 5-325 mg per tablet 1 tablet  1 tablet Oral Q6H PRN Ashley Rogers MD   1 tablet at 04/05/23 0154    insulin aspart U-100 pen 0-5 Units  0-5 Units Subcutaneous QID (AC + HS) PRN Ritesh Yu PA-C        insulin aspart U-100 pen 2 Units  2 Units Subcutaneous TIDWM Ritesh Yu PA-C        levothyroxine tablet 75 mcg  75 mcg Oral Before breakfast Manjit Jiang MD   75 mcg at 04/05/23 1011    LIDOcaine (PF) 10 mg/ml (1%) injection 10 mg  1 mL Intradermal Once PRN Ashley Rogers MD        ondansetron disintegrating tablet 8 mg  8 mg Oral Q8H PRN Ashley Rogers MD        pantoprazole EC tablet 40 mg  40 mg Oral Daily Manjit Jiang MD   40 mg at 04/05/23 1011    polyethylene glycol packet 17 g  17 g Oral Daily Ashley Rogers MD        sodium chloride 0.9% flush 10 mL  10 mL Intravenous PRN Ashley Rgoers MD         Current Discharge Medication List        CONTINUE these medications which have NOT CHANGED    Details   acetaminophen (TYLENOL) 650 MG TbSR Take 1,300 mg by mouth 2 (two) times a day.      ascorbic acid, vitamin C, (VITAMIN C) 1000 MG tablet Take 1,000 mg by mouth once daily.      atorvastatin (LIPITOR) 40 MG tablet Take 40 mg by mouth once daily.      biotin 10,000 mcg TbDL Take 1 tablet by mouth once daily.       calcium-vitamin D3 (OS-KASSANDRA 500 + D3) 500 mg-5 mcg (200 unit) per tablet Take 1 tablet by mouth once daily.      conjugated estrogens (PREMARIN) vaginal cream INSERT 0.5 GRAM VAGINALLY 2 TIMES A WEEK  Qty: 30 g, Refills: 3    Associated Diagnoses: Vaginal atrophy      cranberry extract 200 mg Cap Take 1 capsule  by mouth once daily.      donepeziL (ARICEPT) 10 MG tablet Take 1 tablet (10 mg total) by mouth every evening.  Qty: 90 tablet, Refills: 3      DULoxetine (CYMBALTA) 30 MG capsule TAKE 1 CAPSULE BY MOUTH  TWICE DAILY  Qty: 180 capsule, Refills: 1    Comments: Requesting 1 year supply      ferrous sulfate 324 mg (65 mg iron) TbEC Take 1 tablet (324 mg total) by mouth once daily.  Refills: 0      flash glucose scanning reader (FREESTYLE EFREN 14 DAY READER) Misc Check blood glucose level 3 times daily.  Qty: 1 each, Refills: 0    Associated Diagnoses: Type 2 diabetes mellitus without complication, without long-term current use of insulin      flash glucose sensor (FREESTYLE EFREN 14 DAY SENSOR) Kit 1 application by Misc.(Non-Drug; Combo Route) route every 14 (fourteen) days. Disp 30 or 90 day refill  Qty: 6 kit, Refills: 3    Associated Diagnoses: Type 2 diabetes mellitus without complication, without long-term current use of insulin      hydrocortisone 2.5 % cream Apply topically 2 (two) times daily as needed.  Qty: 1 each, Refills: 1      !! levothyroxine (SYNTHROID) 75 MCG tablet TAKE 1 TABLET(75 MCG) BY MOUTH BEFORE BREAKFAST  Qty: 90 tablet, Refills: 3    Comments: **Patient requests 90 days supply**  Associated Diagnoses: Hypothyroidism, unspecified type      !! levothyroxine (SYNTHROID) 75 MCG tablet Take 1 tablet (75 mcg total) by mouth before breakfast.  Qty: 90 tablet, Refills: 3    Associated Diagnoses: Hypothyroidism, unspecified type      LIDOcaine (LIDODERM) 5 % Apply 1 to 3 patches to back daily. Remove & Discard patches within 12 hours.  Qty: 90 patch, Refills: 5    Associated Diagnoses: Back pain, unspecified back location, unspecified back pain laterality, unspecified chronicity      magnesium glycinate 100 mg Tab Take 500 mg by mouth once daily at 6am.      montelukast (SINGULAIR) 10 mg tablet TAKE 1 TABLET BY MOUTH  DAILY  Qty: 90 tablet, Refills: 3    Comments: Requesting 1 year supply       ondansetron (ZOFRAN-ODT) 8 MG TbDL Take 1 tablet (8 mg total) by mouth every 8 (eight) hours as needed (nausea).  Qty: 30 tablet, Refills: 0      pantoprazole (PROTONIX) 20 MG tablet Take 1 tablet (20 mg total) by mouth once daily.  Qty: 90 tablet, Refills: 2      triamcinolone acetonide 0.1% (KENALOG) 0.1 % paste APPLY TO THE AFFECTED AREA WITH EVERY-TIP THREE TIMES DAILY      vitamin D 1000 units Tab Take 1,000 Units by mouth once daily. D3      HYDROcodone-acetaminophen (NORCO) 5-325 mg per tablet Take 1 tablet by mouth every 8 (eight) hours as needed for Pain.  Qty: 30 tablet, Refills: 0    Comments: Quantity prescribed more than 7 day supply? No      metFORMIN (GLUCOPHAGE) 500 MG tablet Take 1 tablet (500 mg total) by mouth 2 (two) times daily with meals.  Qty: 180 tablet, Refills: 3    Associated Diagnoses: Type 2 diabetes mellitus with other specified complication, without long-term current use of insulin      polyethylene glycol (GLYCOLAX) 17 gram PwPk Take 17 g by mouth once daily.  Qty: 90 each, Refills: 3    Comments: Please dispense Polyethylene glycol powder (not Miralax formulation)      valACYclovir (VALTREX) 1000 MG tablet Take 1 tablet (1,000 mg total) by mouth 3 (three) times daily. for 7 days  Qty: 21 tablet, Refills: 0    Associated Diagnoses: Herpes zoster without complication       !! - Potential duplicate medications found. Please discuss with provider.            I have seen and examined this patient within the last 30 days. My clinical findings that support the need for the home health skilled services and home bound status are the following:no   Weakness/numbness causing balance and gait disturbance due to Weakness/Debility making it taxing to leave home.     Diet:   diabetic diet 2000 calorie    Referrals/ Consults  Physical Therapy to evaluate and treat. Evaluate for home safety and equipment needs; Establish/upgrade home exercise program. Perform / instruct on therapeutic exercises,  gait training, transfer training, and Range of Motion.  Occupational Therapy to evaluate and treat. Evaluate home environment for safety and equipment needs. Perform/Instruct on transfers, ADL training, ROM, and therapeutic exercises.    Activities:   activity as tolerated    Nursing:   Agency to admit patient within 24 hours of hospital discharge unless specified on physician order or at patient request    SN to complete comprehensive assessment including routine vital signs. Instruct on disease process and s/s of complications to report to MD. Review/verify medication list sent home with the patient at time of discharge  and instruct patient/caregiver as needed. Frequency may be adjusted depending on start of care date.     Skilled nurse to perform up to 3 visits PRN for symptoms related to diagnosis    Notify MD if SBP > 160 or < 90; DBP > 90 or < 50; HR > 120 or < 50; Temp > 101; O2 < 88%    Ok to schedule additional visits based on staff availability and patient request on consecutive days within the home health episode.    When multiple disciplines ordered:    Start of Care occurs on Sunday - Wednesday schedule remaining discipline evaluations as ordered on separate consecutive days following the start of care.    Thursday SOC -schedule subsequent evaluations Friday and Monday the following week.     Friday - Saturday SOC - schedule subsequent discipline evaluations on consecutive days starting Monday of the following week.      Miscellaneous   Diabetic Care:   SN to perform and educate Diabetic management with blood glucose monitoring:, Fingerstick blood sugar a.m. and p.m., and Report CBG < 60 or > 350 to physician.    Home Health Aide:  Nursing Weekly, Physical Therapy Three times weekly, and Occupational Therapy Three times weekly    Wound Care Orders  no    I certify that this patient is confined to her home and needs intermittent skilled nursing care, physical therapy, and occupational therapy.

## 2023-04-05 NOTE — SUBJECTIVE & OBJECTIVE
"Interval HPI:   No acute events overnight. Patient in room 1042/1042 A. Blood glucose worsening. BG at and above goal on current insulin regimen (SSI ). Steroid use- None .      Renal function- Normal   Vasopressors-  None     Diet diabetic [facility];  Calorie; Dysphagia Soft (IDDSI Level 6)     Eatin%  Nausea: No  Hypoglycemia and intervention: No  Fever: No  TPN and/or TF: No    BP (!) 117/55 (BP Location: Right arm, Patient Position: Lying)   Pulse (!) 59   Temp 97.3 °F (36.3 °C) (Oral)   Resp 20   Ht 5' 2" (1.575 m)   Wt 68 kg (149 lb 14.6 oz)   LMP  (LMP Unknown)   SpO2 96%   BMI 27.42 kg/m²     Labs Reviewed and Include    Recent Labs   Lab 23  0333   *   CALCIUM 8.7      K 3.4*   CO2 23      BUN 6*   CREATININE 0.7     Lab Results   Component Value Date    WBC 4.13 2023    HGB 10.3 (L) 2023    HCT 31.6 (L) 2023     (H) 2023    PLT 69 (L) 2023     No results for input(s): TSH, FREET4 in the last 168 hours.  Lab Results   Component Value Date    HGBA1C 8.8 (H) 2023       Nutritional status:   Body mass index is 27.42 kg/m².  Lab Results   Component Value Date    ALBUMIN 4.3 2023    ALBUMIN 2.9 (L) 2022    ALBUMIN 3.2 (L) 2022     Lab Results   Component Value Date    PREALBUMIN 13 (L) 2017    PREALBUMIN 14 (L) 2017    PREALBUMIN 9 (L) 2017       Estimated Creatinine Clearance: 58 mL/min (based on SCr of 0.7 mg/dL).    Accu-Checks  Recent Labs     23  1757 23  2327 23  0547 23  0809 23  1219 23  1809 23  2332 23  0510 23  1147 23  1211   POCTGLUCOSE 175* 185* 223* 216* 186* 150* 149* 164* 213* 230*       Current Medications and/or Treatments Impacting Glycemic Control  Immunotherapy:    Immunosuppressants       None          Steroids:   Hormones (From admission, onward)      None          Pressors:    Autonomic Drugs (From " admission, onward)      Start     Stop Route Frequency Ordered    04/04/23 2100  donepeziL tablet 10 mg         -- Oral Nightly 04/04/23 1718          Hyperglycemia/Diabetes Medications:   Antihyperglycemics (From admission, onward)      Start     Stop Route Frequency Ordered    04/05/23 1130  insulin aspart U-100 pen 2 Units         -- SubQ 3 times daily with meals 04/05/23 1010    04/05/23 1108  insulin aspart U-100 pen 0-5 Units         -- SubQ Before meals & nightly PRN 04/05/23 1010

## 2023-04-05 NOTE — PROGRESS NOTES
"Matias Johansen [facility]  Endocrinology  Progress Note    Admit Date: 4/3/2023     Reason for Consult: Management of T2DM, Hyperglycemia     Diabetes diagnosis year: Over 5 years ago    Home Diabetes Medications:  Metformin 500 mg BID    How often checking glucose at home?  Willian on infrequently    BG readings on regimen: 200-400  Hypoglycemia on the regimen?  No  Missed doses on regimen?  No    Diabetes Complications include:     Hyperglycemia    Complicating diabetes co morbidities:   HTN; HLD; SBO      HPI: The patient is an 78 year old female w/ a history of numerous abdominal surgeries and prior small bowel obstructions as well as colon cancer s/p resection in  who presents to the ED for evaluation of diffuse abdominal pain, nausea, vomiting, and bloating which began early this morning. Endocrine conuslted to manage hyperglycemia and type 2 diabetes.     Lab Results   Component Value Date    HGBA1C 8.8 (H) 2023                Interval HPI:   No acute events overnight. Patient in room 1042/1042 A. Blood glucose worsening. BG at and above goal on current insulin regimen (SSI ). Steroid use- None .      Renal function- Normal   Vasopressors-  None     Diet diabetic [facility];  Calorie; Dysphagia Soft (IDDSI Level 6)     Eatin%  Nausea: No  Hypoglycemia and intervention: No  Fever: No  TPN and/or TF: No    BP (!) 117/55 (BP Location: Right arm, Patient Position: Lying)   Pulse (!) 59   Temp 97.3 °F (36.3 °C) (Oral)   Resp 20   Ht 5' 2" (1.575 m)   Wt 68 kg (149 lb 14.6 oz)   LMP  (LMP Unknown)   SpO2 96%   BMI 27.42 kg/m²     Labs Reviewed and Include    Recent Labs   Lab 23  0333   *   CALCIUM 8.7      K 3.4*   CO2 23      BUN 6*   CREATININE 0.7     Lab Results   Component Value Date    WBC 4.13 2023    HGB 10.3 (L) 2023    HCT 31.6 (L) 2023     (H) 2023    PLT 69 (L) 2023     No results for input(s): TSH, FREET4 in the " last 168 hours.  Lab Results   Component Value Date    HGBA1C 8.8 (H) 04/03/2023       Nutritional status:   Body mass index is 27.42 kg/m².  Lab Results   Component Value Date    ALBUMIN 4.3 04/03/2023    ALBUMIN 2.9 (L) 11/30/2022    ALBUMIN 3.2 (L) 11/29/2022     Lab Results   Component Value Date    PREALBUMIN 13 (L) 04/20/2017    PREALBUMIN 14 (L) 04/17/2017    PREALBUMIN 9 (L) 04/13/2017       Estimated Creatinine Clearance: 58 mL/min (based on SCr of 0.7 mg/dL).    Accu-Checks  Recent Labs     04/03/23  1757 04/03/23  2327 04/04/23  0547 04/04/23  0809 04/04/23  1219 04/04/23  1809 04/04/23  2332 04/05/23  0510 04/05/23  1147 04/05/23  1211   POCTGLUCOSE 175* 185* 223* 216* 186* 150* 149* 164* 213* 230*       Current Medications and/or Treatments Impacting Glycemic Control  Immunotherapy:    Immunosuppressants       None          Steroids:   Hormones (From admission, onward)      None          Pressors:    Autonomic Drugs (From admission, onward)      Start     Stop Route Frequency Ordered    04/04/23 2100  donepeziL tablet 10 mg         -- Oral Nightly 04/04/23 1718          Hyperglycemia/Diabetes Medications:   Antihyperglycemics (From admission, onward)      Start     Stop Route Frequency Ordered    04/05/23 1130  insulin aspart U-100 pen 2 Units         -- SubQ 3 times daily with meals 04/05/23 1010    04/05/23 1108  insulin aspart U-100 pen 0-5 Units         -- SubQ Before meals & nightly PRN 04/05/23 1010            ASSESSMENT and PLAN    Endocrine  Type 2 diabetes mellitus without complication, without long-term current use of insulin  Endocrinology consulted for BG management.   BG goal 140-180  T2DM.  Was NPO for SBO,.  Diet advanced.  BG reasonably well controlled.  Will continue to monitor on regimen    - Start Novolog 2 units AC  - Continue Novolog (Insulin Aspart) prn for BG excursions [facility] SSI (150/50)  - BG checks AC/HS  - Hypoglycemia protocol in place    ** Please notify Endocrine for any  change and/or advance in diet**  ** Please call Endocrine for any BG related issues **    Discharge Planning:   Notified by primary team.  Plan to discharge today.  -Start Tradjenta 5 mg daily  -Continue Metformin 500 mg BID        GI  * SBO (small bowel obstruction)  Managed per primary team  Avoid hypoglycemia        GERD (gastroesophageal reflux disease)  Managed by primary team.  On PPI          Ritesh Yu PA-C  Endocrinology  [facility] - [facility]

## 2023-04-05 NOTE — ASSESSMENT & PLAN NOTE
Endocrinology consulted for BG management.   BG goal 140-180  T2DM.  Was NPO for SBO,.  Diet advanced.  BG reasonably well controlled.  Will continue to monitor on regimen    - Novolog (Insulin Aspart) prn for BG excursions [facility] SSI (150/50)  - BG checks q6hr  - Hypoglycemia protocol in place    ** Please notify Endocrine for any change and/or advance in diet**  ** Please call Endocrine for any BG related issues **    Discharge Planning:   Notified by primary team.  Plan to discharge today.  -Start Tradjenta 5 mg daily  -Continue Metformin 500 mg BID

## 2023-04-05 NOTE — PLAN OF CARE
Problem: Adult Inpatient Plan of Care  Goal: Plan of Care Review  4/4/2023 1935 by Destiny Maciel RN  Outcome: Ongoing, Progressing  4/4/2023 1934 by Destiny Maciel RN  Outcome: Ongoing, Progressing  Goal: Patient-Specific Goal (Individualized)  4/4/2023 1935 by Destiny Maciel RN  Outcome: Ongoing, Progressing  4/4/2023 1934 by Destiny Maciel RN  Outcome: Ongoing, Progressing  Goal: Absence of Hospital-Acquired Illness or Injury  4/4/2023 1935 by Destiny Maciel RN  Outcome: Ongoing, Progressing  4/4/2023 1934 by Destiny Maciel RN  Outcome: Ongoing, Progressing  Goal: Optimal Comfort and Wellbeing  4/4/2023 1935 by Destiny Maciel RN  Outcome: Ongoing, Progressing  4/4/2023 1934 by Destiny Maciel RN  Outcome: Ongoing, Progressing  Goal: Readiness for Transition of Care  4/4/2023 1935 by Destiny Maciel RN  Outcome: Ongoing, Progressing  4/4/2023 1934 by Destiny Maciel RN  Outcome: Ongoing, Progressing     Problem: Diabetes Comorbidity  Goal: Blood Glucose Level Within Targeted Range  4/4/2023 1935 by Destiny Maciel RN  Outcome: Ongoing, Progressing  4/4/2023 1934 by Destiny Maciel RN  Outcome: Ongoing, Progressing     Problem: Fall Injury Risk  Goal: Absence of Fall and Fall-Related Injury  4/4/2023 1935 by Destiny Maciel RN  Outcome: Ongoing, Progressing  4/4/2023 1934 by Destiny Maciel RN  Outcome: Ongoing, Progressing     Problem: Infection  Goal: Absence of Infection Signs and Symptoms  4/4/2023 1935 by Destiny Macile RN  Outcome: Ongoing, Progressing  4/4/2023 1934 by Destiny Maciel RN  Outcome: Ongoing, Progressing     Problem: Pain Acute  Goal: Acceptable Pain Control and Functional Ability  Outcome: Ongoing, Progressing

## 2023-04-05 NOTE — PLAN OF CARE
CHW met with patient/family at bedside. Patient experience rounding completed and reviewed the following.     Do you know your discharge plan? Yes or No,    If yes, what is the plan? (Home, Home Health, Rehab, SNF, LTAC, or Other)    Home/Family    If you are discharging home, do you have help at home? Yes or No         Yes    Do you think you will need help at home at discharge? Yes or No     No    Have you discussed your needs and preferences with your SW/CM? Yes or No     Yes    Assigned SW/CM notified of any patient/family needs or concerns.     Zehra Johnson W  Case Management   [phone]

## 2023-04-05 NOTE — ASSESSMENT & PLAN NOTE
Patient is an 78 year old female with h/o colon cancer (s/p resection, 2011), multiple abdominal surgeries, DMII, recurrent SBO, hypothyroidism, GERD, Parkinsonism, IBS, chronic back pain who presents to the ED with concern for SBO. Clinically stable    - Diabetic diet  - DC IVF  - PRN pain and nausea medications  - Daily labs  - Replete electrolytes PRN  - DVT prophylaxis (SCDs and lovenox)  - OOB, ambulate    Dispo: Home today if tolerating PO

## 2023-04-05 NOTE — DISCHARGE SUMMARY
[facility]  General Surgery  Discharge Summary      Patient Name: Anayeli Judd  MRN: [id number]  Admission Date: 4/3/2023  Hospital Length of Stay: 2 days  Discharge Date and Time:  04/05/2023 4:42 PM  Attending Physician: Levar Fletcher MD   Discharging Provider: Quang Rodriguez MD  Primary Care Provider: Darci Guthrie MD     HPI: The patient is an 78 year old female w/ a history of numerous abdominal surgeries and prior small bowel obstructions as well as colon cancer s/p resection in 2011 who presents to the ED for evaluation of diffuse abdominal pain, nausea, vomiting, and bloating which began early this morning. She states that her symptoms are similar to her prior small bowel obstructions. She has not passed flatus for the passed two days and has felt bloated. She did have a very small bowel movement this morning which she described as looking like jelly. She has had at least 5 prior SBOs in the past for which she required an ex lap with TIFFANY in 2017 and another ex lap that was aborted in 3/2022 for her last SBO. Her last colonoscopy was in 2017.    * No surgery found *     Hospital Course:   Please see daily progress notes for full hospital course. She presented for the above reasons. She was managed conservatively and progressed well without operative intervention. The patients pain was controlled with PO medications.  Ambulating without issue.  Voiding without issue with adequate urine output. Passing gas and stool.  Tolerating diet without nausea or vomiting.  Incision site is clean, dry, and intact.  Discharged on 4/5/23.      Consults:   Consults (From admission, onward)          Status Ordering Provider     Inpatient consult to Midline team  Once        Provider:  (Not yet assigned)    Completed LEVAR FLETCHER     Inpatient consult to Endocrinology  Once        Provider:  (Not yet assigned)    Completed PRUDENCE LAGUNA     IP consult to case management  Once        Provider:  (Not  yet assigned)    Acknowledged LEVAR SHEN     Inpatient consult to General Surgery  Once        Provider:  (Not yet assigned)    Completed ELLA VERAS            Significant Diagnostic Studies: Labs: BMP:   Recent Labs   Lab 04/04/23  0547 04/05/23  0333   * 152*    137   K 3.8 3.4*    107   CO2 24 23   BUN 7* 6*   CREATININE 0.8 0.7   CALCIUM 9.0 8.7   MG 1.9 1.1*    and CBC   Recent Labs   Lab 04/04/23  0547 04/05/23  0333   WBC 5.24 4.13   HGB 10.8* 10.3*   HCT 32.8* 31.6*   PLT 78* 69*       Pending Diagnostic Studies:       None          Final Active Diagnoses:    Diagnosis Date Noted POA    PRINCIPAL PROBLEM:  SBO (small bowel obstruction) [K56.609] 03/11/2020 Yes    Type 2 diabetes mellitus without complication, without long-term current use of insulin [E11.9] 05/05/2015 Yes     Chronic    GERD (gastroesophageal reflux disease) [K21.9]  Yes     Chronic    Acquired hypothyroidism [E03.9] 01/11/2013 Yes     Chronic      Problems Resolved During this Admission:      Discharged Condition: good    Disposition: Home or Self Care    Follow Up:   Follow-up Information       Darci Guthrie MD Follow up.    Specialty: Internal Medicine  Contact information:  [facility] [address] [facility] [address] [phone]                           Patient Instructions:      No dressing needed     Notify your health care provider if you experience any of the following:  temperature >100.4     Notify your health care provider if you experience any of the following:  persistent nausea and vomiting or diarrhea     Notify your health care provider if you experience any of the following:  severe uncontrolled pain     Notify your health care provider if you experience any of the following:  redness, tenderness, or signs of infection (pain, swelling, redness, odor or green/yellow discharge around incision site)     Notify your health care provider if you experience any of the following:  difficulty breathing  or increased cough     Notify your health care provider if you experience any of the following:  severe persistent headache     Notify your health care provider if you experience any of the following:  worsening rash     Notify your health care provider if you experience any of the following:  persistent dizziness, light-headedness, or visual disturbances     Notify your health care provider if you experience any of the following:  increased confusion or weakness     Activity as tolerated     Medications:  Reconciled Home Medications:      Medication List        START taking these medications      linaGLIPtin 5 mg Tab tablet  Commonly known as: TRADJENTA  Take 1 tablet (5 mg total) by mouth once daily.            CONTINUE taking these medications      acetaminophen 650 MG Tbsr  Commonly known as: TYLENOL  Take 1,300 mg by mouth 2 (two) times a day.     ascorbic acid (vitamin C) 1000 MG tablet  Commonly known as: VITAMIN C  Take 1,000 mg by mouth once daily.     atorvastatin 40 MG tablet  Commonly known as: LIPITOR  Take 40 mg by mouth once daily.     biotin 10,000 mcg Tbdl  Take 1 tablet by mouth once daily.     calcium-vitamin D3 500 mg-5 mcg (200 unit) per tablet  Commonly known as: OS-KASSANDRA 500 + D3  Take 1 tablet by mouth once daily.     cranberry extract 200 mg Cap  Take 1 capsule by mouth once daily.     donepeziL 10 MG tablet  Commonly known as: ARICEPT  Take 1 tablet (10 mg total) by mouth every evening.     DULoxetine 30 MG capsule  Commonly known as: CYMBALTA  TAKE 1 CAPSULE BY MOUTH  TWICE DAILY     ferrous sulfate 324 mg (65 mg iron) Tbec  Take 1 tablet (324 mg total) by mouth once daily.     FREESTYLE EFREN 14 DAY READER Misc  Generic drug: flash glucose scanning reader  Check blood glucose level 3 times daily.     FREESTYLE EFREN 14 DAY SENSOR Kit  Generic drug: flash glucose sensor  1 application by Misc.(Non-Drug; Combo Route) route every 14 (fourteen) days. Disp 30 or 90 day refill      HYDROcodone-acetaminophen 5-325 mg per tablet  Commonly known as: NORCO  Take 1 tablet by mouth every 8 (eight) hours as needed for Pain.     hydrocortisone 2.5 % cream  Apply topically 2 (two) times daily as needed.     * levothyroxine 75 MCG tablet  Commonly known as: SYNTHROID  TAKE 1 TABLET(75 MCG) BY MOUTH BEFORE BREAKFAST     * levothyroxine 75 MCG tablet  Commonly known as: SYNTHROID  Take 1 tablet (75 mcg total) by mouth before breakfast.     LIDOcaine 5 %  Commonly known as: LIDODERM  Apply 1 to 3 patches to back daily. Remove & Discard patches within 12 hours.     magnesium glycinate 100 mg Tab  Take 500 mg by mouth once daily at 6am.     metFORMIN 500 MG tablet  Commonly known as: GLUCOPHAGE  Take 1 tablet (500 mg total) by mouth 2 (two) times daily with meals.     montelukast 10 mg tablet  Commonly known as: SINGULAIR  TAKE 1 TABLET BY MOUTH  DAILY     ondansetron 8 MG Tbdl  Commonly known as: ZOFRAN-ODT  Take 1 tablet (8 mg total) by mouth every 8 (eight) hours as needed (nausea).     pantoprazole 20 MG tablet  Commonly known as: PROTONIX  Take 1 tablet (20 mg total) by mouth once daily.     polyethylene glycol 17 gram Pwpk  Commonly known as: GLYCOLAX  Take 17 g by mouth once daily.     PREMARIN vaginal cream  Generic drug: conjugated estrogens  INSERT 0.5 GRAM VAGINALLY 2 TIMES A WEEK     triamcinolone acetonide 0.1% 0.1 % paste  Commonly known as: KENALOG  APPLY TO THE AFFECTED AREA WITH EVERY-TIP THREE TIMES DAILY     valACYclovir 1000 MG tablet  Commonly known as: VALTREX  Take 1 tablet (1,000 mg total) by mouth 3 (three) times daily. for 7 days     vitamin D 1000 units Tab  Commonly known as: VITAMIN D3  Take 1,000 Units by mouth once daily. D3           * This list has 2 medication(s) that are the same as other medications prescribed for you. Read the directions carefully, and ask your doctor or other care provider to review them with you.                  Quang Rodriguez MD  General  Surgery  [facility]

## 2023-04-05 NOTE — PLAN OF CARE
Problem: Adult Inpatient Plan of Care  Goal: Plan of Care Review  Outcome: Ongoing, Progressing  Goal: Patient-Specific Goal (Individualized)  Outcome: Ongoing, Progressing  Goal: Absence of Hospital-Acquired Illness or Injury  Outcome: Ongoing, Progressing  Goal: Optimal Comfort and Wellbeing  Outcome: Ongoing, Progressing  Goal: Readiness for Transition of Care  Outcome: Ongoing, Progressing     Problem: Diabetes Comorbidity  Goal: Blood Glucose Level Within Targeted Range  Outcome: Ongoing, Progressing     Problem: Fall Injury Risk  Goal: Absence of Fall and Fall-Related Injury  Outcome: Ongoing, Progressing     Problem: Infection  Goal: Absence of Infection Signs and Symptoms  Outcome: Ongoing, Progressing     Problem: Pain Acute  Goal: Acceptable Pain Control and Functional Ability  Outcome: Ongoing, Progressing    Patient remained free from falls and injury throughout shift. No acute events overnight. Patient alert and oriented x4; vital signs stable. Safety measures addressed --bed locked and in low position with siderails up x2 and call light/personal items within reach. Patient assisted to bedside commode Plan of care reviewed with patient; all questions and concerns addressed. Patient verbalizes understanding.

## 2023-04-05 NOTE — DISCHARGE INSTRUCTIONS
Patient Instructions:       No dressing needed      Notify your health care provider if you experience any of the following:  temperature >100.4      Notify your health care provider if you experience any of the following:  persistent nausea and vomiting or diarrhea      Notify your health care provider if you experience any of the following:  severe uncontrolled pain      Notify your health care provider if you experience any of the following:  redness, tenderness, or signs of infection (pain, swelling, redness, odor or green/yellow discharge around incision site)      Notify your health care provider if you experience any of the following:  difficulty breathing or increased cough      Notify your health care provider if you experience any of the following:  severe persistent headache      Notify your health care provider if you experience any of the following:  worsening rash      Notify your health care provider if you experience any of the following:  persistent dizziness, light-headedness, or visual disturbances      Notify your health care provider if you experience any of the following:  increased confusion or weakness      Activity as tolerated       For Diabetes control- start Tradgenta and continue Metformin

## 2023-04-06 ENCOUNTER — PATIENT OUTREACH (OUTPATIENT)
Dept: ADMINISTRATIVE | Facility: CLINIC | Age: 78
End: 2023-04-06
Payer: MEDICARE

## 2023-04-06 ENCOUNTER — TELEPHONE (OUTPATIENT)
Dept: INTERNAL MEDICINE | Facility: CLINIC | Age: 78
End: 2023-04-06
Payer: MEDICARE

## 2023-04-06 DIAGNOSIS — K56.609 SBO (SMALL BOWEL OBSTRUCTION): Primary | ICD-10-CM

## 2023-04-06 NOTE — PLAN OF CARE
Problem: Adult Inpatient Plan of Care  Goal: Plan of Care Review  Outcome: Met  Goal: Patient-Specific Goal (Individualized)  Outcome: Met  Goal: Absence of Hospital-Acquired Illness or Injury  Outcome: Met  Goal: Optimal Comfort and Wellbeing  Outcome: Met  Goal: Readiness for Transition of Care  Outcome: Met     Problem: Diabetes Comorbidity  Goal: Blood Glucose Level Within Targeted Range  Outcome: Met     Problem: Fall Injury Risk  Goal: Absence of Fall and Fall-Related Injury  Outcome: Met     Problem: Infection  Goal: Absence of Infection Signs and Symptoms  Outcome: Met     Problem: Pain Acute  Goal: Acceptable Pain Control and Functional Ability  Outcome: Met     Problem: Skin Injury Risk Increased  Goal: Skin Health and Integrity  Outcome: Met

## 2023-04-06 NOTE — PROGRESS NOTES
C3 nurse spoke with Anayeli Judd for a TCC post hospital discharge follow up call. The patient does not have a scheduled HOSFU appointment with Darci Guthrie MD within 5-7 days post hospital discharge date 04/05/2023. C3 nurse was unable to schedule HOSFU appointment in [facility].    Message sent to PCP staff reporting the patient is requesting an NP home referral.    NP home referral placed.

## 2023-04-06 NOTE — PLAN OF CARE
Discharge instructions given, Pt instructed to start Tradjenta and continue Metformin for Diabetic control. Clonidine will now be a patch 0.1mg once every 7 days as ordered. Midline catheter removed. Pt left stable with all belongings.

## 2023-04-07 ENCOUNTER — TELEPHONE (OUTPATIENT)
Dept: ADMINISTRATIVE | Facility: CLINIC | Age: 78
End: 2023-04-07
Payer: MEDICARE

## 2023-04-07 NOTE — TELEPHONE ENCOUNTER
C3 nurse spoke with Anayeli Judd's sitter, Shawna, in regards to a medication concern and selecting option 2 on the PD Tracker. Shawna expresses concerns that the patient was to receive a Norco prescription upon discharge, however, the pharmacy did not have a prescription written or sent. Urgent messages routed to both discharging physician, Dr. Rodriguez, and the patients PCP requesting assistance with a new prescription for pain management. Shawna denied any further needs or concerns at this time. Advised the patient's sitter to reach out to [facility] on call for any new concerns.

## 2023-04-08 ENCOUNTER — TELEPHONE (OUTPATIENT)
Dept: ADMINISTRATIVE | Facility: CLINIC | Age: 78
End: 2023-04-08
Payer: MEDICARE

## 2023-04-08 NOTE — TELEPHONE ENCOUNTER
Spoke to pt who states her children said she should not be taking valtrex, nurse notified pt it is active med on her list advised her to verify with PCP at appt this Friday. Pt verbalized understanding.

## 2023-04-10 ENCOUNTER — TELEPHONE (OUTPATIENT)
Dept: ADMINISTRATIVE | Facility: CLINIC | Age: 78
End: 2023-04-10
Payer: MEDICARE

## 2023-04-10 ENCOUNTER — NURSE TRIAGE (OUTPATIENT)
Dept: ADMINISTRATIVE | Facility: CLINIC | Age: 78
End: 2023-04-10
Payer: MEDICARE

## 2023-04-10 NOTE — TELEPHONE ENCOUNTER
C3 nurse contacted successfully Anayeli Judd after patient had selected option 5 on PD Tracker. The patient denied any follow-up scheduling assistance as her PCP's staff had resolved the appointment conflict. However, upon disconnecting with patient, she expressed concerns that she was still experiencing abdominal cramping, pain, and loose stools s/p hospital discharge on 04/05/2023. The patient verbalized agreement to speak with a triage RN at this time regarding her symptoms of concern, however, traige nurses were unavailable at this time but the patient's demographics were provided for triage nurse to return call to the patient when immediately available. The patient was given the OOC number prior to disconnection and verbalized stability for triage to return call.

## 2023-04-10 NOTE — TELEPHONE ENCOUNTER
PD 4/5 for SBO.   C/o RLQ pain that comes & goes before/after she eats & when she has BM. Currently 5-6/10 pain. +nausea. -vomiting. +diarrhea, 2-3 episodes today. +weakness with standing.     Dispo-UC/ER now for eval. Pt declines, says she just left hospital, requesting something for pain. Reiterated importance of following advisement based on symptoms & questions answered. Pt & CG both vu.     Reason for Disposition   Constant abdominal pain lasting > 2 hours    Additional Information   Negative: Passed out (i.e., fainted, collapsed and was not responding)   Negative: Shock suspected (e.g., cold/pale/clammy skin, too weak to stand, low BP, rapid pulse)   Negative: Sounds like a life-threatening emergency to the triager   Negative: SEVERE abdominal pain (e.g., excruciating)   Negative: Vomiting red blood or black (coffee ground) material   Negative: Bloody, black, or tarry bowel movements  (Exception: Chronic-unchanged black-grey bowel movements and is taking iron pills or Pepto-Bismol.)    Protocols used: Abdominal Pain - Female-A-[place]

## 2023-04-14 PROCEDURE — G0180 MD CERTIFICATION HHA PATIENT: HCPCS | Mod: ,,, | Performed by: INTERNAL MEDICINE

## 2023-04-14 PROCEDURE — G0180 PR HOME HEALTH MD CERTIFICATION: ICD-10-PCS | Mod: ,,, | Performed by: INTERNAL MEDICINE

## 2023-04-17 ENCOUNTER — OFFICE VISIT (OUTPATIENT)
Dept: INTERNAL MEDICINE | Facility: CLINIC | Age: 78
End: 2023-04-17
Payer: MEDICARE

## 2023-04-17 DIAGNOSIS — Z09 HOSPITAL DISCHARGE FOLLOW-UP: ICD-10-CM

## 2023-04-17 DIAGNOSIS — E87.6 HYPOKALEMIA: ICD-10-CM

## 2023-04-17 DIAGNOSIS — R10.9 ABDOMINAL PAIN, UNSPECIFIED ABDOMINAL LOCATION: Primary | ICD-10-CM

## 2023-04-17 DIAGNOSIS — E11.69 TYPE 2 DIABETES MELLITUS WITH OTHER SPECIFIED COMPLICATION, WITHOUT LONG-TERM CURRENT USE OF INSULIN: ICD-10-CM

## 2023-04-17 DIAGNOSIS — G43.809 OTHER MIGRAINE WITHOUT STATUS MIGRAINOSUS, NOT INTRACTABLE: ICD-10-CM

## 2023-04-17 DIAGNOSIS — E83.42 HYPOMAGNESEMIA: ICD-10-CM

## 2023-04-17 DIAGNOSIS — E03.4 HYPOTHYROIDISM DUE TO ACQUIRED ATROPHY OF THYROID: ICD-10-CM

## 2023-04-17 DIAGNOSIS — B02.9 HERPES ZOSTER WITHOUT COMPLICATION: ICD-10-CM

## 2023-04-17 PROCEDURE — 99499 UNLISTED E&M SERVICE: CPT | Mod: 95,,, | Performed by: INTERNAL MEDICINE

## 2023-04-17 PROCEDURE — 99499 NO LOS: ICD-10-PCS | Mod: 95,,, | Performed by: INTERNAL MEDICINE

## 2023-04-17 RX ORDER — RIMEGEPANT SULFATE 75 MG/75MG
75 TABLET, ORALLY DISINTEGRATING ORAL DAILY PRN
Qty: 30 TABLET | Refills: 2 | Status: SHIPPED | OUTPATIENT
Start: 2023-04-17 | End: 2023-05-01

## 2023-04-17 RX ORDER — HYDROCODONE BITARTRATE AND ACETAMINOPHEN 5; 325 MG/1; MG/1
1 TABLET ORAL EVERY 8 HOURS PRN
Qty: 21 TABLET | Refills: 0 | Status: SHIPPED | OUTPATIENT
Start: 2023-04-17 | End: 2023-04-17

## 2023-04-17 RX ORDER — BUTALBITAL, ACETAMINOPHEN AND CAFFEINE 50; 325; 40 MG/1; MG/1; MG/1
1 TABLET ORAL EVERY 6 HOURS PRN
Qty: 30 TABLET | Refills: 2 | Status: SHIPPED | OUTPATIENT
Start: 2023-04-17 | End: 2023-05-17

## 2023-04-17 RX ORDER — VALACYCLOVIR HYDROCHLORIDE 1 G/1
1000 TABLET, FILM COATED ORAL DAILY
Qty: 30 TABLET | Refills: 2 | Status: SHIPPED | OUTPATIENT
Start: 2023-04-17 | End: 2023-10-25

## 2023-04-17 RX ORDER — HYDROCODONE BITARTRATE AND ACETAMINOPHEN 5; 325 MG/1; MG/1
1 TABLET ORAL EVERY 6 HOURS PRN
Qty: 28 TABLET | Refills: 0 | Status: SHIPPED | OUTPATIENT
Start: 2023-04-17

## 2023-04-18 NOTE — PROGRESS NOTES
Established Patient - Audio Only Telehealth Visit     The patient location is: Home  The chief complaint leading to consultation is: Hospital f/u, Recurrent SBO  Visit type: Virtual visit with audio only (telephone)  Total time spent with patient: 30 mins       The reason for the audio only service rather than synchronous audio and video virtual visit was related to technical difficulties or patient preference/necessity.     Each patient to whom I provide medical services by telemedicine is:  (1) informed of the relationship between the physician and patient and the respective role of any other health care provider with respect to management of the patient; and (2) notified that they may decline to receive medical services by telemedicine and may withdraw from such care at any time. Patient verbally consented to receive this service via voice-only telephone call.       HPI:   Spoke with pt, caregiver and daughter by phone today. Patient did not feel well enough to come to clinic today and requested phone f/u. No urgent issues. Some continued intermittent abdominal pains since recent hospitalization for recurrent SBO 2/2 diffuse adhesions (inoperable without high bleed risk). Has been having bm's, last this am. Using Glycolax qhs. Has been eating lettuce, beans, chinese food without issue. In hospital, had low K and Mag. Was on mag baseline at home but not currently she is aware of.  Hx of possible recurrent zoster. Ophtho following and has recommended daily prophy per caregiver. Following up with them next week. No rash recently. Notes pain in area. Also has migraine hx. Caregiver notes Fioricet helped in past and used old rx the other day. Also requesting prn Norco for abdominal pains.    Past Medical History:   Diagnosis Date    Abdominal pain     Allergic rhinitis     Arthritis     Blood platelet disorder     Blood platelet disorder     Blood transfusion     during delivery and     Bowel obstruction      Cervical radiculopathy     followed by dr cloud    Colon cancer     transverse colon; resected; Stage IIA (pT3 pN0 MX)    Degenerative joint disease (DJD) of lumbar spine     Diabetes mellitus     Diarrhea     Falls 2020    Family history of breast cancer     Family history of colon cancer     Fatty liver     GERD (gastroesophageal reflux disease)     History of shingles     Hyperlipidemia     Hypomagnesemia     Hypothyroidism     Irritable bowel syndrome     Microscopic colitis     treated     Migraine     Myelodysplastic syndrome     Raynaud phenomenon     Raynaud's disease     Squamous cell carcinoma of skin     Type 2 diabetes mellitus       Past Surgical History:   Procedure Laterality Date    ADENOIDECTOMY      APPENDECTOMY      BACK SURGERY      CARPAL TUNNEL RELEASE      bilateral      SECTION      CHOLECYSTECTOMY  1965    COLECTOMY  2011    Transverse colon resection by Dr. Aguirre    COLONOSCOPY N/A 2017    Procedure: COLONOSCOPY;  Surgeon: Manjit Alvarez MD;  Location: [facility] (4TH FLR);  Service: Endoscopy;  Laterality: N/A;    COLONOSCOPY N/A 2019    Procedure: COLONOSCOPY;  Surgeon: Ramiro Jefferson MD;  Location: [facility] (4TH FLR);  Service: Endoscopy;  Laterality: N/A;  PM prep    COLONOSCOPY N/A 2022    Procedure: COLONOSCOPY;  Surgeon: Ramiro Jefferson MD;  Location: [facility] (2ND FLR);  Service: Endoscopy;  Laterality: N/A;  EGD and colonoscopy in 6-8 weeks from now     states has constipation. -extended Golytely prep    CYSTOSCOPY N/A 2021    Procedure: CYSTOSCOPY;  Surgeon: Viridiana Valenzuela MD;  Location: [facility] FLR;  Service: Urology;  Laterality: N/A;    ENDOSCOPIC ULTRASOUND OF UPPER GASTROINTESTINAL TRACT N/A 2018    Procedure: ULTRASOUND, UPPER GI TRACT, ENDOSCOPIC;  Surgeon: Jose Hess MD;  Location: [facility];  Service: Endoscopy;  Laterality: N/A;    ENDOSCOPIC ULTRASOUND OF UPPER GASTROINTESTINAL TRACT N/A  01/23/2019    Procedure: ULTRASOUND, UPPER GI TRACT, ENDOSCOPIC;  Surgeon: Jose Hess MD;  Location: [facility] ENDO (2ND FLR);  Service: Endoscopy;  Laterality: N/A;    ESOPHAGOGASTRODUODENOSCOPY N/A 11/16/2018    Procedure: EGD (ESOPHAGOGASTRODUODENOSCOPY);  Surgeon: Angelo Reynolds MD;  Location: [facility] ENDO (2ND FLR);  Service: Endoscopy;  Laterality: N/A;    ESOPHAGOGASTRODUODENOSCOPY N/A 06/26/2019    Procedure: EGD (ESOPHAGOGASTRODUODENOSCOPY);  Surgeon: Ramiro Jefferson MD;  Location: [facility] ENDO (4TH FLR);  Service: Endoscopy;  Laterality: N/A;    ESOPHAGOGASTRODUODENOSCOPY N/A 5/4/2022    Procedure: EGD (ESOPHAGOGASTRODUODENOSCOPY);  Surgeon: Ramiro Jefferson MD;  Location: [facility] ENDO (2ND FLR);  Service: Endoscopy;  Laterality: N/A;  fully vaccinated-GT    EYE SURGERY      Cataract Removal    FLUOROSCOPIC URODYNAMIC STUDY N/A 11/09/2021    Procedure: URODYNAMIC STUDY, FLUOROSCOPIC;  Surgeon: Viridiana Valenzuela MD;  Location: [facility] OR 1ST FLR;  Service: Urology;  Laterality: N/A;  90 minutes     HYSTERECTOMY      JOINT REPLACEMENT      posterolateral fusion with autograft bone and Dixon mineralized bone matrix  02/01/2013    at [facility] for lumbar spine stenosis    SMALL INTESTINE SURGERY      SPINE SURGERY      TOE SURGERY      TONSILLECTOMY      TRANSFORAMINAL EPIDURAL INJECTION OF STEROID Bilateral 12/21/2022    Procedure: Injection,steroid,epidural, Todd L5/S1 TF CONTRAST DIRECT REFERRAL;  Surgeon: Toni Osorio MD;  Location: [facility] PAIN MGT;  Service: Pain Management;  Laterality: Bilateral;    TRIGGER FINGER RELEASE        Social History     Tobacco Use    Smoking status: Never    Smokeless tobacco: Never   Substance Use Topics    Alcohol use: Not Currently     Comment: socially, rarely    Drug use: Never         Assessment and plan:     1) Hospital d/c f/u, Recurent SBO - Typical of SBO hx, responded at least partially to gastrograffin challenge in hospital. Some intermittent pain still but having bm's.  Continue Glycolax nightly. Small frequent meals as tolerated. Pt and caregiver request continued Norco prn. Discussed downside with relation to bowels and to be very selective about times of use    2) Migraines - Okay with using prn Fioricet for now since helping. Will attempt [facility] approval to trial, possibly use qod prophy if effective. Again discussed selective use of Fioricet and not using with Norco. Overall, I agree with symptom control prioritization but must be aware of potential adverse effects.    3) Hypomagnesemia, Hypokalemia (very mild) - Restart daily Mag glycinate supplement. Recheck BMP and Mag when going to Ophtho appt next week. Will recheck TSH for hypothyroid f/u at that time as well.    4) Zoster ophthalmicus hx - Ophtho following and has recommended daily Valtrex prophy per caregiver. Discussed I am okay with this, though I suspect migraines and possible post-herpetic neuralgia playing roles as opposed to frequent zoster recurrence.     Discussed all with pt, caregiver (Shawna) and daughter (Danielle).     This service was not originating from a related E/M service provided within the previous 7 days nor will  to an E/M service or procedure within the next 24 hours or my soonest available appointment.  Prevailing standard of care was able to be met in this audio-only visit.

## 2023-04-23 ENCOUNTER — PATIENT MESSAGE (OUTPATIENT)
Dept: INTERNAL MEDICINE | Facility: CLINIC | Age: 78
End: 2023-04-23
Payer: MEDICARE

## 2023-04-23 DIAGNOSIS — K21.9 GASTROESOPHAGEAL REFLUX DISEASE, UNSPECIFIED WHETHER ESOPHAGITIS PRESENT: Primary | Chronic | ICD-10-CM

## 2023-04-23 RX ORDER — PANTOPRAZOLE SODIUM 20 MG/1
20 TABLET, DELAYED RELEASE ORAL 2 TIMES DAILY
Qty: 180 TABLET | Refills: 1 | Status: SHIPPED | OUTPATIENT
Start: 2023-04-23 | End: 2023-10-02

## 2023-04-26 ENCOUNTER — LAB VISIT (OUTPATIENT)
Dept: LAB | Facility: HOSPITAL | Age: 78
End: 2023-04-26
Payer: MEDICARE

## 2023-04-26 DIAGNOSIS — E83.42 HYPOMAGNESEMIA: ICD-10-CM

## 2023-04-26 DIAGNOSIS — E87.6 HYPOKALEMIA: ICD-10-CM

## 2023-04-26 DIAGNOSIS — E03.4 HYPOTHYROIDISM DUE TO ACQUIRED ATROPHY OF THYROID: ICD-10-CM

## 2023-04-26 LAB
ANION GAP SERPL CALC-SCNC: 13 MMOL/L (ref 8–16)
BUN SERPL-MCNC: 9 MG/DL (ref 8–23)
CALCIUM SERPL-MCNC: 9.8 MG/DL (ref 8.7–10.5)
CHLORIDE SERPL-SCNC: 104 MMOL/L (ref 95–110)
CO2 SERPL-SCNC: 23 MMOL/L (ref 23–29)
CREAT SERPL-MCNC: 0.8 MG/DL (ref 0.5–1.4)
EST. GFR  (NO RACE VARIABLE): >60 ML/MIN/1.73 M^2
GLUCOSE SERPL-MCNC: 179 MG/DL (ref 70–110)
MAGNESIUM SERPL-MCNC: 1.3 MG/DL (ref 1.6–2.6)
POTASSIUM SERPL-SCNC: 3.5 MMOL/L (ref 3.5–5.1)
SODIUM SERPL-SCNC: 140 MMOL/L (ref 136–145)
T4 FREE SERPL-MCNC: 1.05 NG/DL (ref 0.71–1.51)
TSH SERPL DL<=0.005 MIU/L-ACNC: 6.13 UIU/ML (ref 0.4–4)

## 2023-04-26 PROCEDURE — 84443 ASSAY THYROID STIM HORMONE: CPT | Performed by: INTERNAL MEDICINE

## 2023-04-26 PROCEDURE — 80048 BASIC METABOLIC PNL TOTAL CA: CPT | Performed by: INTERNAL MEDICINE

## 2023-04-26 PROCEDURE — 83735 ASSAY OF MAGNESIUM: CPT | Performed by: INTERNAL MEDICINE

## 2023-04-26 PROCEDURE — 36415 COLL VENOUS BLD VENIPUNCTURE: CPT | Mod: PO | Performed by: INTERNAL MEDICINE

## 2023-04-26 PROCEDURE — 84439 ASSAY OF FREE THYROXINE: CPT | Performed by: INTERNAL MEDICINE

## 2023-04-27 ENCOUNTER — PATIENT MESSAGE (OUTPATIENT)
Dept: INTERNAL MEDICINE | Facility: CLINIC | Age: 78
End: 2023-04-27
Payer: MEDICARE

## 2023-05-01 ENCOUNTER — PATIENT MESSAGE (OUTPATIENT)
Dept: INTERNAL MEDICINE | Facility: CLINIC | Age: 78
End: 2023-05-01
Payer: MEDICARE

## 2023-05-01 RX ORDER — UBROGEPANT 100 MG/1
TABLET ORAL
Qty: 16 TABLET | Refills: 2 | Status: SHIPPED | OUTPATIENT
Start: 2023-05-01

## 2023-05-08 DIAGNOSIS — E11.9 TYPE 2 DIABETES MELLITUS WITHOUT COMPLICATION, WITHOUT LONG-TERM CURRENT USE OF INSULIN: ICD-10-CM

## 2023-05-08 RX ORDER — FLASH GLUCOSE SENSOR
1 KIT MISCELLANEOUS
Qty: 6 KIT | Refills: 3 | Status: SHIPPED | OUTPATIENT
Start: 2023-05-08 | End: 2023-11-30 | Stop reason: SDUPTHER

## 2023-05-08 NOTE — TELEPHONE ENCOUNTER
No care due was identified.  Health [facility] Embedded Care Due Messages. Reference number: [id number].   5/08/2023 1:57:44 PM CDT

## 2023-05-24 ENCOUNTER — TELEPHONE (OUTPATIENT)
Dept: INTERNAL MEDICINE | Facility: CLINIC | Age: 78
End: 2023-05-24
Payer: MEDICARE

## 2023-05-24 DIAGNOSIS — J32.9 SINUSITIS, UNSPECIFIED CHRONICITY, UNSPECIFIED LOCATION: Primary | ICD-10-CM

## 2023-05-24 RX ORDER — AZITHROMYCIN 250 MG/1
TABLET, FILM COATED ORAL
Qty: 6 TABLET | Refills: 0 | Status: SHIPPED | OUTPATIENT
Start: 2023-05-24 | End: 2023-05-29

## 2023-05-24 NOTE — TELEPHONE ENCOUNTER
----- Message from Darci Guthrie MD sent at 5/24/2023  3:24 PM CDT -----  Sent Azithromycin to [facility].     Thanks!  Myles  ----- Message -----  From: Sarahy Clark LPN  Sent: 5/24/2023   3:13 PM CDT  To: Darci Guthrie MD      ----- Message -----  From: Sarahy lCark LPN  Sent: 5/24/2023   3:13 PM CDT  To: Jerri Bejarano Staff    Patient is c/o sinus issues and is requesting an antibiotic to be sent to [facility] .

## 2023-06-02 ENCOUNTER — EXTERNAL HOME HEALTH (OUTPATIENT)
Dept: HOME HEALTH SERVICES | Facility: HOSPITAL | Age: 78
End: 2023-06-02
Payer: MEDICARE

## 2023-06-03 DIAGNOSIS — Z71.89 COMPLEX CARE COORDINATION: ICD-10-CM

## 2023-06-06 ENCOUNTER — OFFICE VISIT (OUTPATIENT)
Dept: INTERNAL MEDICINE | Facility: CLINIC | Age: 78
End: 2023-06-06
Payer: MEDICARE

## 2023-06-06 ENCOUNTER — HOSPITAL ENCOUNTER (OUTPATIENT)
Dept: RADIOLOGY | Facility: HOSPITAL | Age: 78
Discharge: HOME | End: 2023-06-06
Attending: INTERNAL MEDICINE
Payer: MEDICARE

## 2023-06-06 ENCOUNTER — CLINICAL SUPPORT (OUTPATIENT)
Dept: INTERNAL MEDICINE | Facility: CLINIC | Age: 78
End: 2023-06-06
Payer: MEDICARE

## 2023-06-06 VITALS
HEIGHT: 62 IN | DIASTOLIC BLOOD PRESSURE: 58 MMHG | HEART RATE: 58 BPM | BODY MASS INDEX: 27.42 KG/M2 | SYSTOLIC BLOOD PRESSURE: 106 MMHG | WEIGHT: 149 LBS

## 2023-06-06 DIAGNOSIS — G89.29 NECK PAIN, CHRONIC: ICD-10-CM

## 2023-06-06 DIAGNOSIS — M25.551 BILATERAL HIP PAIN: ICD-10-CM

## 2023-06-06 DIAGNOSIS — M54.2 NECK PAIN, CHRONIC: ICD-10-CM

## 2023-06-06 DIAGNOSIS — E11.9 TYPE 2 DIABETES MELLITUS WITHOUT COMPLICATION, WITHOUT LONG-TERM CURRENT USE OF INSULIN: ICD-10-CM

## 2023-06-06 DIAGNOSIS — D64.9 ANEMIA, UNSPECIFIED TYPE: ICD-10-CM

## 2023-06-06 DIAGNOSIS — M54.50 LUMBAR PAIN WITH RADIATION DOWN BOTH LEGS: ICD-10-CM

## 2023-06-06 DIAGNOSIS — M25.552 BILATERAL HIP PAIN: ICD-10-CM

## 2023-06-06 DIAGNOSIS — M79.604 LUMBAR PAIN WITH RADIATION DOWN BOTH LEGS: Primary | ICD-10-CM

## 2023-06-06 DIAGNOSIS — E83.42 HYPOMAGNESEMIA: ICD-10-CM

## 2023-06-06 DIAGNOSIS — M79.605 LUMBAR PAIN WITH RADIATION DOWN BOTH LEGS: Primary | ICD-10-CM

## 2023-06-06 DIAGNOSIS — E03.9 ACQUIRED HYPOTHYROIDISM: Chronic | ICD-10-CM

## 2023-06-06 DIAGNOSIS — M79.604 LUMBAR PAIN WITH RADIATION DOWN BOTH LEGS: ICD-10-CM

## 2023-06-06 DIAGNOSIS — E87.1 HYPONATREMIA: ICD-10-CM

## 2023-06-06 DIAGNOSIS — R07.81 RIB PAIN ON RIGHT SIDE: ICD-10-CM

## 2023-06-06 DIAGNOSIS — M79.605 LUMBAR PAIN WITH RADIATION DOWN BOTH LEGS: ICD-10-CM

## 2023-06-06 DIAGNOSIS — M54.50 LUMBAR PAIN WITH RADIATION DOWN BOTH LEGS: Primary | ICD-10-CM

## 2023-06-06 LAB
ANION GAP SERPL CALC-SCNC: 9 MMOL/L (ref 8–16)
BUN SERPL-MCNC: 14 MG/DL (ref 8–23)
CALCIUM SERPL-MCNC: 9.3 MG/DL (ref 8.7–10.5)
CHLORIDE SERPL-SCNC: 104 MMOL/L (ref 95–110)
CO2 SERPL-SCNC: 24 MMOL/L (ref 23–29)
CREAT SERPL-MCNC: 1 MG/DL (ref 0.5–1.4)
ERYTHROCYTE [DISTWIDTH] IN BLOOD BY AUTOMATED COUNT: 14.6 % (ref 11.5–14.5)
EST. GFR  (NO RACE VARIABLE): 57 ML/MIN/1.73 M^2
ESTIMATED AVG GLUCOSE: 151 MG/DL (ref 68–131)
GLUCOSE SERPL-MCNC: 251 MG/DL (ref 70–110)
HBA1C MFR BLD: 6.9 % (ref 4–5.6)
HCT VFR BLD AUTO: 36.4 % (ref 37–48.5)
HGB BLD-MCNC: 11.8 G/DL (ref 12–16)
MAGNESIUM SERPL-MCNC: 1.5 MG/DL (ref 1.6–2.6)
MCH RBC QN AUTO: 35 PG (ref 27–31)
MCHC RBC AUTO-ENTMCNC: 32.4 G/DL (ref 32–36)
MCV RBC AUTO: 108 FL (ref 82–98)
PLATELET # BLD AUTO: 76 K/UL (ref 150–450)
PMV BLD AUTO: 10.8 FL (ref 9.2–12.9)
POTASSIUM SERPL-SCNC: 3.9 MMOL/L (ref 3.5–5.1)
RBC # BLD AUTO: 3.37 M/UL (ref 4–5.4)
SODIUM SERPL-SCNC: 137 MMOL/L (ref 136–145)
T4 FREE SERPL-MCNC: 0.79 NG/DL (ref 0.71–1.51)
TSH SERPL DL<=0.005 MIU/L-ACNC: 6.05 UIU/ML (ref 0.4–4)
WBC # BLD AUTO: 4.41 K/UL (ref 3.9–12.7)

## 2023-06-06 PROCEDURE — 99999 PR PBB SHADOW E&M-EST. PATIENT-LVL I: CPT | Mod: PBBFAC,,,

## 2023-06-06 PROCEDURE — 85027 COMPLETE CBC AUTOMATED: CPT | Performed by: INTERNAL MEDICINE

## 2023-06-06 PROCEDURE — 72070 XR THORACIC SPINE AP LATERAL: ICD-10-PCS | Mod: 26,,, | Performed by: RADIOLOGY

## 2023-06-06 PROCEDURE — 73521 X-RAY EXAM HIPS BI 2 VIEWS: CPT | Mod: TC

## 2023-06-06 PROCEDURE — 99211 OFF/OP EST MAY X REQ PHY/QHP: CPT | Mod: PBBFAC

## 2023-06-06 PROCEDURE — 72050 X-RAY EXAM NECK SPINE 4/5VWS: CPT | Mod: TC

## 2023-06-06 PROCEDURE — 72050 X-RAY EXAM NECK SPINE 4/5VWS: CPT | Mod: 26,,, | Performed by: RADIOLOGY

## 2023-06-06 PROCEDURE — 71101 X-RAY EXAM UNILAT RIBS/CHEST: CPT | Mod: TC,RT

## 2023-06-06 PROCEDURE — 99213 PR OFFICE/OUTPT VISIT, EST, LEVL III, 20-29 MIN: ICD-10-PCS | Mod: S$PBB,,, | Performed by: INTERNAL MEDICINE

## 2023-06-06 PROCEDURE — 71101 XR RIBS MIN 3 VIEWS W/ PA CHEST RIGHT: ICD-10-PCS | Mod: 26,RT,, | Performed by: RADIOLOGY

## 2023-06-06 PROCEDURE — 84443 ASSAY THYROID STIM HORMONE: CPT | Performed by: INTERNAL MEDICINE

## 2023-06-06 PROCEDURE — 72110 X-RAY EXAM L-2 SPINE 4/>VWS: CPT | Mod: TC

## 2023-06-06 PROCEDURE — 80048 BASIC METABOLIC PNL TOTAL CA: CPT | Performed by: INTERNAL MEDICINE

## 2023-06-06 PROCEDURE — 72110 XR LUMBAR SPINE COMPLETE 5 VIEW: ICD-10-PCS | Mod: 26,,, | Performed by: RADIOLOGY

## 2023-06-06 PROCEDURE — 72070 X-RAY EXAM THORAC SPINE 2VWS: CPT | Mod: 26,,, | Performed by: RADIOLOGY

## 2023-06-06 PROCEDURE — 72050 XR CERVICAL SPINE COMPLETE 5 VIEW: ICD-10-PCS | Mod: 26,,, | Performed by: RADIOLOGY

## 2023-06-06 PROCEDURE — 99999 PR PBB SHADOW E&M-EST. PATIENT-LVL III: ICD-10-PCS | Mod: PBBFAC,,, | Performed by: INTERNAL MEDICINE

## 2023-06-06 PROCEDURE — 84439 ASSAY OF FREE THYROXINE: CPT | Performed by: INTERNAL MEDICINE

## 2023-06-06 PROCEDURE — 99213 OFFICE O/P EST LOW 20 MIN: CPT | Mod: PBBFAC,27 | Performed by: INTERNAL MEDICINE

## 2023-06-06 PROCEDURE — 99213 OFFICE O/P EST LOW 20 MIN: CPT | Mod: S$PBB,,, | Performed by: INTERNAL MEDICINE

## 2023-06-06 PROCEDURE — 72110 X-RAY EXAM L-2 SPINE 4/>VWS: CPT | Mod: 26,,, | Performed by: RADIOLOGY

## 2023-06-06 PROCEDURE — 73521 X-RAY EXAM HIPS BI 2 VIEWS: CPT | Mod: 26,,, | Performed by: RADIOLOGY

## 2023-06-06 PROCEDURE — 99999 PR PBB SHADOW E&M-EST. PATIENT-LVL I: ICD-10-PCS | Mod: PBBFAC,,,

## 2023-06-06 PROCEDURE — 72070 X-RAY EXAM THORAC SPINE 2VWS: CPT | Mod: TC

## 2023-06-06 PROCEDURE — 71101 X-RAY EXAM UNILAT RIBS/CHEST: CPT | Mod: 26,RT,, | Performed by: RADIOLOGY

## 2023-06-06 PROCEDURE — 73521 XR HIPS BILATERAL 2 VIEW INCL AP PELVIS: ICD-10-PCS | Mod: 26,,, | Performed by: RADIOLOGY

## 2023-06-06 PROCEDURE — 36415 COLL VENOUS BLD VENIPUNCTURE: CPT | Performed by: INTERNAL MEDICINE

## 2023-06-06 PROCEDURE — 83735 ASSAY OF MAGNESIUM: CPT | Performed by: INTERNAL MEDICINE

## 2023-06-06 PROCEDURE — 83036 HEMOGLOBIN GLYCOSYLATED A1C: CPT | Performed by: INTERNAL MEDICINE

## 2023-06-06 PROCEDURE — 99999 PR PBB SHADOW E&M-EST. PATIENT-LVL III: CPT | Mod: PBBFAC,,, | Performed by: INTERNAL MEDICINE

## 2023-06-07 ENCOUNTER — PATIENT MESSAGE (OUTPATIENT)
Dept: INTERNAL MEDICINE | Facility: CLINIC | Age: 78
End: 2023-06-07
Payer: MEDICARE

## 2023-06-07 NOTE — PROGRESS NOTES
Subjective:       Patient ID: Anayeli Judd is a 78 y.o. female.    Chief Complaint: Back Pain      HPI:  C/O multiple areas of musculoskeletal pain. Chronic issue but flairing recently. Known DJD, including C and L spine. Notes pain along most of back, neck, radiation to legs, b/l hips, right anterior ribs. No recent fall or trauma. Baseline sciatica hx. Reviewed meds. Notes she doesn't think has been taking the Cymbalta. Had stopped gabapentin in past and did note benefit in terms of cognition so would prefer to stay off these types of meds.    Of note, agreed on getting follow up labs for chronic medical issues while she is in clinic.      Past Medical History:   Diagnosis Date    Abdominal pain     Allergic rhinitis     Arthritis     Blood platelet disorder     Blood platelet disorder     Blood transfusion     during delivery and     Bowel obstruction     Cervical radiculopathy     followed by dr cloud    Colon cancer     transverse colon; resected; Stage IIA (pT3 pN0 MX)    Degenerative joint disease (DJD) of lumbar spine     Diabetes mellitus     Diarrhea     Falls 2020    Family history of breast cancer     Family history of colon cancer     Fatty liver     GERD (gastroesophageal reflux disease)     History of shingles     Hyperlipidemia     Hypomagnesemia     Hypothyroidism     Irritable bowel syndrome     Microscopic colitis     treated     Migraine     Myelodysplastic syndrome     Raynaud phenomenon     Raynaud's disease     Squamous cell carcinoma of skin     Type 2 diabetes mellitus          Current Outpatient Medications:     acetaminophen (TYLENOL) 650 MG TbSR, Take 1,300 mg by mouth 2 (two) times a day., Disp: , Rfl:     ascorbic acid, vitamin C, (VITAMIN C) 1000 MG tablet, Take 1,000 mg by mouth once daily., Disp: , Rfl:     atorvastatin (LIPITOR) 40 MG tablet, Take 40 mg by mouth once daily., Disp: , Rfl:     biotin 10,000 mcg TbDL, Take 1 tablet by mouth once daily. , Disp:  , Rfl:     calcium-vitamin D3 (OS-KASSANDRA 500 + D3) 500 mg-5 mcg (200 unit) per tablet, Take 1 tablet by mouth once daily., Disp: , Rfl:     conjugated estrogens (PREMARIN) vaginal cream, INSERT 0.5 GRAM VAGINALLY 2 TIMES A WEEK, Disp: 30 g, Rfl: 3    cranberry extract 200 mg Cap, Take 1 capsule by mouth once daily., Disp: , Rfl:     donepeziL (ARICEPT) 10 MG tablet, Take 1 tablet (10 mg total) by mouth every evening., Disp: 90 tablet, Rfl: 3    DULoxetine (CYMBALTA) 30 MG capsule, TAKE 1 CAPSULE BY MOUTH TWICE  DAILY, Disp: 180 capsule, Rfl: 3    ferrous sulfate 324 mg (65 mg iron) TbEC, Take 1 tablet (324 mg total) by mouth once daily., Disp: , Rfl: 0    flash glucose scanning reader (FREESTYLE EFREN 14 DAY READER) Misc, Check blood glucose level 3 times daily., Disp: 1 each, Rfl: 0    flash glucose sensor (FREESTYLE EFREN 14 DAY SENSOR) Kit, 1 application by Misc.(Non-Drug; Combo Route) route every 14 (fourteen) days. Disp 30 or 90 day refill, Disp: 6 kit, Rfl: 3    HYDROcodone-acetaminophen (NORCO) 5-325 mg per tablet, Take 1 tablet by mouth every 6 (six) hours as needed for Pain., Disp: 28 tablet, Rfl: 0    hydrocortisone 2.5 % cream, Apply topically 2 (two) times daily as needed., Disp: 1 each, Rfl: 1    levothyroxine (SYNTHROID) 75 MCG tablet, TAKE 1 TABLET(75 MCG) BY MOUTH BEFORE BREAKFAST, Disp: 90 tablet, Rfl: 3    levothyroxine (SYNTHROID) 75 MCG tablet, Take 1 tablet (75 mcg total) by mouth before breakfast., Disp: 90 tablet, Rfl: 3    LIDOcaine (LIDODERM) 5 %, Apply 1 to 3 patches to back daily. Remove & Discard patches within 12 hours., Disp: 90 patch, Rfl: 5    linaGLIPtin (TRADJENTA) 5 mg Tab tablet, Take 1 tablet (5 mg total) by mouth once daily., Disp: 90 tablet, Rfl: 3    metFORMIN (GLUCOPHAGE) 500 MG tablet, Take 1 tablet (500 mg total) by mouth 2 (two) times daily with meals., Disp: 180 tablet, Rfl: 3    montelukast (SINGULAIR) 10 mg tablet, TAKE 1 TABLET BY MOUTH  DAILY, Disp: 90 tablet, Rfl: 3     ondansetron (ZOFRAN-ODT) 8 MG TbDL, Take 1 tablet (8 mg total) by mouth every 8 (eight) hours as needed (nausea)., Disp: 30 tablet, Rfl: 0    pantoprazole (PROTONIX) 20 MG tablet, Take 1 tablet (20 mg total) by mouth 2 (two) times a day., Disp: 180 tablet, Rfl: 1    polyethylene glycol (GLYCOLAX) 17 gram PwPk, Take 17 g by mouth once daily., Disp: 90 each, Rfl: 3    triamcinolone acetonide 0.1% (KENALOG) 0.1 % paste, APPLY TO THE AFFECTED AREA WITH EVERY-TIP THREE TIMES DAILY, Disp: , Rfl:     ubrogepant (UBRELVY) 100 mg tablet, Take 1 tablet daily as needed for migraine headache. May take 1 additional tablet 2 hours later if needed., Disp: 16 tablet, Rfl: 2    valACYclovir (VALTREX) 1000 MG tablet, Take 1 tablet (1,000 mg total) by mouth once daily., Disp: 30 tablet, Rfl: 2    vitamin D 1000 units Tab, Take 1,000 Units by mouth once daily. D3, Disp: , Rfl:     Past Surgical History:   Procedure Laterality Date    ADENOIDECTOMY      APPENDECTOMY      BACK SURGERY      CARPAL TUNNEL RELEASE      bilateral      SECTION      CHOLECYSTECTOMY  1965    COLECTOMY  2011    Transverse colon resection by Dr. Aguirre    COLONOSCOPY N/A 2017    Procedure: COLONOSCOPY;  Surgeon: Manjit Alvarez MD;  Location: [address]);  Service: Endoscopy;  Laterality: N/A;    COLONOSCOPY N/A 2019    Procedure: COLONOSCOPY;  Surgeon: Ramiro Jefferson MD;  Location: [facility] (4TH FLR);  Service: Endoscopy;  Laterality: N/A;  PM prep    COLONOSCOPY N/A 2022    Procedure: COLONOSCOPY;  Surgeon: Ramiro Jefferson MD;  Location: [facility] (2ND FLR);  Service: Endoscopy;  Laterality: N/A;  EGD and colonoscopy in 6-8 weeks from now     states has constipation. -extended Golytely prep    CYSTOSCOPY N/A 2021    Procedure: CYSTOSCOPY;  Surgeon: Viridiana Valenzuela MD;  Location: [address];  Service: Urology;  Laterality: N/A;    ENDOSCOPIC ULTRASOUND OF UPPER GASTROINTESTINAL TRACT N/A 2018     Procedure: ULTRASOUND, UPPER GI TRACT, ENDOSCOPIC;  Surgeon: Jose Hess MD;  Location: [facility] ENDO;  Service: Endoscopy;  Laterality: N/A;    ENDOSCOPIC ULTRASOUND OF UPPER GASTROINTESTINAL TRACT N/A 01/23/2019    Procedure: ULTRASOUND, UPPER GI TRACT, ENDOSCOPIC;  Surgeon: Jose Hess MD;  Location: [facility] ENDO (2ND FLR);  Service: Endoscopy;  Laterality: N/A;    ESOPHAGOGASTRODUODENOSCOPY N/A 11/16/2018    Procedure: EGD (ESOPHAGOGASTRODUODENOSCOPY);  Surgeon: Angelo Reynolds MD;  Location: [facility] (2ND FLR);  Service: Endoscopy;  Laterality: N/A;    ESOPHAGOGASTRODUODENOSCOPY N/A 06/26/2019    Procedure: EGD (ESOPHAGOGASTRODUODENOSCOPY);  Surgeon: Ramiro Jefferson MD;  Location: [facility] (4TH FLR);  Service: Endoscopy;  Laterality: N/A;    ESOPHAGOGASTRODUODENOSCOPY N/A 5/4/2022    Procedure: EGD (ESOPHAGOGASTRODUODENOSCOPY);  Surgeon: Ramiro Jefferson MD;  Location: [facility] (2ND FLR);  Service: Endoscopy;  Laterality: N/A;  fully vaccinated-GT    EYE SURGERY      Cataract Removal    FLUOROSCOPIC URODYNAMIC STUDY N/A 11/09/2021    Procedure: URODYNAMIC STUDY, FLUOROSCOPIC;  Surgeon: Viridiana Valenzuela MD;  Location: [facility] OR 1ST FLR;  Service: Urology;  Laterality: N/A;  90 minutes     HYSTERECTOMY      JOINT REPLACEMENT      posterolateral fusion with autograft bone and Eun mineralized bone matrix  02/01/2013    at [facility] for lumbar spine stenosis    SMALL INTESTINE SURGERY      SPINE SURGERY      TOE SURGERY      TONSILLECTOMY      TRANSFORAMINAL EPIDURAL INJECTION OF STEROID Bilateral 12/21/2022    Procedure: Injection,steroid,epidural, Todd L5/S1 TF CONTRAST DIRECT REFERRAL;  Surgeon: Toni Osorio MD;  Location: [facility] PAIN MGT;  Service: Pain Management;  Laterality: Bilateral;    TRIGGER FINGER RELEASE         Social History     Tobacco Use    Smoking status: Never    Smokeless tobacco: Never   Substance Use Topics    Alcohol use: Not Currently     Comment: socially, rarely    Drug use:  Never         Objective:      Vitals:    06/06/23 1507   BP: (!) 106/58   Pulse: (!) 58       Physical Exam  Constitutional:       General: She is not in acute distress.     Appearance: Normal appearance. She is not ill-appearing.   HENT:      Head: Normocephalic and atraumatic.   Cardiovascular:      Rate and Rhythm: Normal rate and regular rhythm.   Pulmonary:      Effort: Pulmonary effort is normal. No respiratory distress.      Breath sounds: Normal breath sounds.   Musculoskeletal:         General: Tenderness (Baseline moderate tenderness along spine, SI, hips. Mild along right lateral and anterior lower ribs.) present.      Cervical back: No rigidity.   Skin:     Findings: No bruising or rash.   Neurological:      General: No focal deficit present.      Mental Status: She is alert and oriented to person, place, and time.   Psychiatric:         Mood and Affect: Mood normal.         Behavior: Behavior normal.         Recent Results (from the past 48 hour(s))   Hemoglobin A1C    Collection Time: 06/06/23  4:00 PM   Result Value Ref Range    Hemoglobin A1C 6.9 (H) 4.0 - 5.6 %    Estimated Avg Glucose 151 (H) 68 - 131 mg/dL   Magnesium    Collection Time: 06/06/23  4:00 PM   Result Value Ref Range    Magnesium 1.5 (L) 1.6 - 2.6 mg/dL   TSH    Collection Time: 06/06/23  4:00 PM   Result Value Ref Range    TSH 6.050 (H) 0.400 - 4.000 uIU/mL   BASIC METABOLIC PANEL    Collection Time: 06/06/23  4:00 PM   Result Value Ref Range    Sodium 137 136 - 145 mmol/L    Potassium 3.9 3.5 - 5.1 mmol/L    Chloride 104 95 - 110 mmol/L    CO2 24 23 - 29 mmol/L    Glucose 251 (H) 70 - 110 mg/dL    BUN 14 8 - 23 mg/dL    Creatinine 1.0 0.5 - 1.4 mg/dL    Calcium 9.3 8.7 - 10.5 mg/dL    Anion Gap 9 8 - 16 mmol/L    eGFR 57.0 (A) >60 mL/min/1.73 m^2   CBC Without Differential    Collection Time: 06/06/23  4:00 PM   Result Value Ref Range    WBC 4.41 3.90 - 12.70 K/uL    RBC 3.37 (L) 4.00 - 5.40 M/uL    Hemoglobin 11.8 (L) 12.0 - 16.0  g/dL    Hematocrit 36.4 (L) 37.0 - 48.5 %     (H) 82 - 98 fL    MCH 35.0 (H) 27.0 - 31.0 pg    MCHC 32.4 32.0 - 36.0 g/dL    RDW 14.6 (H) 11.5 - 14.5 %    Platelets 76 (L) 150 - 450 K/uL    MPV 10.8 9.2 - 12.9 fL   T4, Free    Collection Time: 06/06/23  4:00 PM   Result Value Ref Range    Free T4 0.79 0.71 - 1.51 ng/dL      Assessment/Plan:     1) DJD (c-spine, lumbar spine, hips), Right lower rib pains - Chronic issues with flairing recently. No recent trauma. Discussed restarting Cymbalta 30 mg bid. Will continue to avoid neuro sedating meds as much as we can. Notes previous RED did not help lbp and sciatica issues. May consider short course of anti-inflammatory such as Mobic if needed. Agreed on getting updated imaging of the concerning areas.    2) T2DM - A1c controlled on metformin 500 bid and tradjenta 5 daily. Repeat a1c 3 to 6 mths.    3) Hypothyroidism - Stable mild TSH elevation with normal FT4. Continuing Levo 75 qam. Bowel hx makes me hesitate to adjust since I suspect we would potentially go back and forth on the dosing over time if aiming for strict goals.    4) Hypomagnesemia - Improving. Continue daily supplement. Repeat mag level with next labs.    5) MDS - Stable counts though her MCV is trending up. Due soon for 6 mth f/u with Hematology.

## 2023-06-08 DIAGNOSIS — M79.605 LUMBAR PAIN WITH RADIATION DOWN BOTH LEGS: ICD-10-CM

## 2023-06-08 DIAGNOSIS — M54.50 LUMBAR PAIN WITH RADIATION DOWN BOTH LEGS: ICD-10-CM

## 2023-06-08 DIAGNOSIS — M25.551 BILATERAL HIP PAIN: ICD-10-CM

## 2023-06-08 DIAGNOSIS — R07.81 RIB PAIN ON RIGHT SIDE: ICD-10-CM

## 2023-06-08 DIAGNOSIS — M54.2 NECK PAIN, CHRONIC: ICD-10-CM

## 2023-06-08 DIAGNOSIS — G89.29 NECK PAIN, CHRONIC: ICD-10-CM

## 2023-06-08 DIAGNOSIS — M25.552 BILATERAL HIP PAIN: ICD-10-CM

## 2023-06-08 DIAGNOSIS — M79.604 LUMBAR PAIN WITH RADIATION DOWN BOTH LEGS: ICD-10-CM

## 2023-06-08 RX ORDER — MELOXICAM 7.5 MG/1
7.5 TABLET ORAL DAILY PRN
Qty: 14 TABLET | Refills: 0 | Status: SHIPPED | OUTPATIENT
Start: 2023-06-08 | End: 2023-06-22

## 2023-06-08 RX ORDER — MELOXICAM 7.5 MG/1
7.5 TABLET ORAL DAILY PRN
Qty: 14 TABLET | Refills: 0 | Status: SHIPPED | OUTPATIENT
Start: 2023-06-08 | End: 2023-06-08 | Stop reason: SDUPTHER

## 2023-06-13 PROCEDURE — G0179 PR HOME HEALTH MD RECERTIFICATION: ICD-10-PCS | Mod: ,,, | Performed by: INTERNAL MEDICINE

## 2023-06-13 PROCEDURE — G0179 MD RECERTIFICATION HHA PT: HCPCS | Mod: ,,, | Performed by: INTERNAL MEDICINE

## 2023-06-22 ENCOUNTER — PATIENT MESSAGE (OUTPATIENT)
Dept: INTERNAL MEDICINE | Facility: CLINIC | Age: 78
End: 2023-06-22
Payer: MEDICARE

## 2023-06-22 DIAGNOSIS — J32.9 SINUSITIS, UNSPECIFIED CHRONICITY, UNSPECIFIED LOCATION: Primary | ICD-10-CM

## 2023-06-22 RX ORDER — AMOXICILLIN AND CLAVULANATE POTASSIUM 875; 125 MG/1; MG/1
1 TABLET, FILM COATED ORAL 2 TIMES DAILY
Qty: 14 TABLET | Refills: 0 | Status: SHIPPED | OUTPATIENT
Start: 2023-06-22 | End: 2023-06-29

## 2023-06-29 ENCOUNTER — PATIENT MESSAGE (OUTPATIENT)
Dept: INTERNAL MEDICINE | Facility: CLINIC | Age: 78
End: 2023-06-29
Payer: MEDICARE

## 2023-07-03 ENCOUNTER — EXTERNAL HOME HEALTH (OUTPATIENT)
Dept: HOME HEALTH SERVICES | Facility: HOSPITAL | Age: 78
End: 2023-07-03
Payer: MEDICARE

## 2023-07-03 ENCOUNTER — PATIENT MESSAGE (OUTPATIENT)
Dept: INTERNAL MEDICINE | Facility: CLINIC | Age: 78
End: 2023-07-03
Payer: MEDICARE

## 2023-07-07 ENCOUNTER — HOSPITAL ENCOUNTER (INPATIENT)
Facility: HOSPITAL | Age: 78
LOS: 2 days | Discharge: HOME HEALTH | DRG: 390 | End: 2023-07-09
Attending: EMERGENCY MEDICINE | Admitting: SURGERY
Payer: MEDICARE

## 2023-07-07 DIAGNOSIS — R11.2 NAUSEA & VOMITING: ICD-10-CM

## 2023-07-07 DIAGNOSIS — R10.9 ABDOMINAL PAIN: ICD-10-CM

## 2023-07-07 DIAGNOSIS — K56.50 SMALL BOWEL OBSTRUCTION DUE TO ADHESIONS: Primary | ICD-10-CM

## 2023-07-07 DIAGNOSIS — K56.609 SMALL BOWEL OBSTRUCTION: ICD-10-CM

## 2023-07-07 LAB
ALBUMIN SERPL BCP-MCNC: 4.2 G/DL (ref 3.5–5.2)
ALP SERPL-CCNC: 99 U/L (ref 55–135)
ALT SERPL W/O P-5'-P-CCNC: 52 U/L (ref 10–44)
ANION GAP SERPL CALC-SCNC: 13 MMOL/L (ref 8–16)
AST SERPL-CCNC: 67 U/L (ref 10–40)
BASOPHILS # BLD AUTO: 0.04 K/UL (ref 0–0.2)
BASOPHILS NFR BLD: 0.4 % (ref 0–1.9)
BILIRUB SERPL-MCNC: 1 MG/DL (ref 0.1–1)
BILIRUB UR QL STRIP: NEGATIVE
BUN SERPL-MCNC: 13 MG/DL (ref 8–23)
CALCIUM SERPL-MCNC: 10.4 MG/DL (ref 8.7–10.5)
CHLORIDE SERPL-SCNC: 102 MMOL/L (ref 95–110)
CLARITY UR REFRACT.AUTO: CLEAR
CO2 SERPL-SCNC: 25 MMOL/L (ref 23–29)
COLOR UR AUTO: YELLOW
CREAT SERPL-MCNC: 1 MG/DL (ref 0.5–1.4)
DIFFERENTIAL METHOD: ABNORMAL
EOSINOPHIL # BLD AUTO: 0.1 K/UL (ref 0–0.5)
EOSINOPHIL NFR BLD: 1.3 % (ref 0–8)
ERYTHROCYTE [DISTWIDTH] IN BLOOD BY AUTOMATED COUNT: 14.1 % (ref 11.5–14.5)
EST. GFR  (NO RACE VARIABLE): 57 ML/MIN/1.73 M^2
GLUCOSE SERPL-MCNC: 197 MG/DL (ref 70–110)
GLUCOSE UR QL STRIP: NEGATIVE
HCT VFR BLD AUTO: 40.8 % (ref 37–48.5)
HCV AB SERPL QL IA: NORMAL
HGB BLD-MCNC: 13.7 G/DL (ref 12–16)
HGB UR QL STRIP: NEGATIVE
HIV 1+2 AB+HIV1 P24 AG SERPL QL IA: NORMAL
HYALINE CASTS UR QL AUTO: 7 /LPF
IMM GRANULOCYTES # BLD AUTO: 0.03 K/UL (ref 0–0.04)
IMM GRANULOCYTES NFR BLD AUTO: 0.3 % (ref 0–0.5)
KETONES UR QL STRIP: NEGATIVE
LACTATE SERPL-SCNC: 2.8 MMOL/L (ref 0.5–2.2)
LEUKOCYTE ESTERASE UR QL STRIP: ABNORMAL
LIPASE SERPL-CCNC: 33 U/L (ref 4–60)
LYMPHOCYTES # BLD AUTO: 1 K/UL (ref 1–4.8)
LYMPHOCYTES NFR BLD: 11.2 % (ref 18–48)
MCH RBC QN AUTO: 35.8 PG (ref 27–31)
MCHC RBC AUTO-ENTMCNC: 33.6 G/DL (ref 32–36)
MCV RBC AUTO: 107 FL (ref 82–98)
MICROSCOPIC COMMENT: ABNORMAL
MONOCYTES # BLD AUTO: 0.6 K/UL (ref 0.3–1)
MONOCYTES NFR BLD: 6.6 % (ref 4–15)
NEUTROPHILS # BLD AUTO: 7.3 K/UL (ref 1.8–7.7)
NEUTROPHILS NFR BLD: 80.2 % (ref 38–73)
NITRITE UR QL STRIP: NEGATIVE
NRBC BLD-RTO: 0 /100 WBC
PH UR STRIP: 6 [PH] (ref 5–8)
PLATELET # BLD AUTO: 90 K/UL (ref 150–450)
PMV BLD AUTO: 10.4 FL (ref 9.2–12.9)
POTASSIUM SERPL-SCNC: 4.4 MMOL/L (ref 3.5–5.1)
PROT SERPL-MCNC: 8.3 G/DL (ref 6–8.4)
PROT UR QL STRIP: NEGATIVE
RBC # BLD AUTO: 3.83 M/UL (ref 4–5.4)
RBC #/AREA URNS AUTO: 1 /HPF (ref 0–4)
SODIUM SERPL-SCNC: 140 MMOL/L (ref 136–145)
SP GR UR STRIP: 1.01 (ref 1–1.03)
SQUAMOUS #/AREA URNS AUTO: 2 /HPF
URN SPEC COLLECT METH UR: ABNORMAL
WBC # BLD AUTO: 9.07 K/UL (ref 3.9–12.7)
WBC #/AREA URNS AUTO: 5 /HPF (ref 0–5)

## 2023-07-07 PROCEDURE — 86803 HEPATITIS C AB TEST: CPT | Performed by: PHYSICIAN ASSISTANT

## 2023-07-07 PROCEDURE — 25500020 PHARM REV CODE 255: Performed by: EMERGENCY MEDICINE

## 2023-07-07 PROCEDURE — 93010 ELECTROCARDIOGRAM REPORT: CPT | Mod: ,,, | Performed by: INTERNAL MEDICINE

## 2023-07-07 PROCEDURE — 93010 EKG 12-LEAD: ICD-10-PCS | Mod: ,,, | Performed by: INTERNAL MEDICINE

## 2023-07-07 PROCEDURE — 93005 ELECTROCARDIOGRAM TRACING: CPT

## 2023-07-07 PROCEDURE — 99285 EMERGENCY DEPT VISIT HI MDM: CPT | Mod: 25

## 2023-07-07 PROCEDURE — 85025 COMPLETE CBC W/AUTO DIFF WBC: CPT | Performed by: PHYSICIAN ASSISTANT

## 2023-07-07 PROCEDURE — 12000002 HC ACUTE/MED SURGE SEMI-PRIVATE ROOM

## 2023-07-07 PROCEDURE — 80053 COMPREHEN METABOLIC PANEL: CPT | Performed by: PHYSICIAN ASSISTANT

## 2023-07-07 PROCEDURE — 83690 ASSAY OF LIPASE: CPT | Performed by: PHYSICIAN ASSISTANT

## 2023-07-07 PROCEDURE — 81001 URINALYSIS AUTO W/SCOPE: CPT | Performed by: PHYSICIAN ASSISTANT

## 2023-07-07 PROCEDURE — 63600175 PHARM REV CODE 636 W HCPCS: Performed by: PHYSICIAN ASSISTANT

## 2023-07-07 PROCEDURE — 96375 TX/PRO/DX INJ NEW DRUG ADDON: CPT

## 2023-07-07 PROCEDURE — 87389 HIV-1 AG W/HIV-1&-2 AB AG IA: CPT | Performed by: PHYSICIAN ASSISTANT

## 2023-07-07 PROCEDURE — 96361 HYDRATE IV INFUSION ADD-ON: CPT

## 2023-07-07 PROCEDURE — 83605 ASSAY OF LACTIC ACID: CPT | Performed by: PHYSICIAN ASSISTANT

## 2023-07-07 RX ORDER — PROCHLORPERAZINE EDISYLATE 5 MG/ML
5 INJECTION INTRAMUSCULAR; INTRAVENOUS EVERY 6 HOURS PRN
Status: DISCONTINUED | OUTPATIENT
Start: 2023-07-07 | End: 2023-07-09 | Stop reason: HOSPADM

## 2023-07-07 RX ORDER — ENOXAPARIN SODIUM 100 MG/ML
40 INJECTION SUBCUTANEOUS EVERY 24 HOURS
Status: DISCONTINUED | OUTPATIENT
Start: 2023-07-08 | End: 2023-07-09 | Stop reason: HOSPADM

## 2023-07-07 RX ORDER — SODIUM CHLORIDE, SODIUM LACTATE, POTASSIUM CHLORIDE, CALCIUM CHLORIDE 600; 310; 30; 20 MG/100ML; MG/100ML; MG/100ML; MG/100ML
1000 INJECTION, SOLUTION INTRAVENOUS
Status: COMPLETED | OUTPATIENT
Start: 2023-07-07 | End: 2023-07-08

## 2023-07-07 RX ORDER — MORPHINE SULFATE 2 MG/ML
2 INJECTION, SOLUTION INTRAMUSCULAR; INTRAVENOUS
Status: DISCONTINUED | OUTPATIENT
Start: 2023-07-07 | End: 2023-07-07

## 2023-07-07 RX ORDER — SODIUM CHLORIDE 0.9 % (FLUSH) 0.9 %
10 SYRINGE (ML) INJECTION
Status: DISCONTINUED | OUTPATIENT
Start: 2023-07-07 | End: 2023-07-09 | Stop reason: HOSPADM

## 2023-07-07 RX ORDER — MORPHINE SULFATE 4 MG/ML
3 INJECTION, SOLUTION INTRAMUSCULAR; INTRAVENOUS
Status: COMPLETED | OUTPATIENT
Start: 2023-07-07 | End: 2023-07-07

## 2023-07-07 RX ORDER — SODIUM CHLORIDE, SODIUM LACTATE, POTASSIUM CHLORIDE, CALCIUM CHLORIDE 600; 310; 30; 20 MG/100ML; MG/100ML; MG/100ML; MG/100ML
INJECTION, SOLUTION INTRAVENOUS CONTINUOUS
Status: DISCONTINUED | OUTPATIENT
Start: 2023-07-07 | End: 2023-07-09

## 2023-07-07 RX ORDER — ONDANSETRON 2 MG/ML
4 INJECTION INTRAMUSCULAR; INTRAVENOUS
Status: COMPLETED | OUTPATIENT
Start: 2023-07-07 | End: 2023-07-07

## 2023-07-07 RX ORDER — ONDANSETRON 2 MG/ML
4 INJECTION INTRAMUSCULAR; INTRAVENOUS EVERY 6 HOURS PRN
Status: DISCONTINUED | OUTPATIENT
Start: 2023-07-07 | End: 2023-07-09 | Stop reason: HOSPADM

## 2023-07-07 RX ADMIN — MORPHINE SULFATE 3 MG: 4 INJECTION INTRAVENOUS at 08:07

## 2023-07-07 RX ADMIN — SODIUM CHLORIDE, POTASSIUM CHLORIDE, SODIUM LACTATE AND CALCIUM CHLORIDE 1000 ML: 600; 310; 30; 20 INJECTION, SOLUTION INTRAVENOUS at 11:07

## 2023-07-07 RX ADMIN — IOHEXOL 75 ML: 350 INJECTION, SOLUTION INTRAVENOUS at 08:07

## 2023-07-07 RX ADMIN — ONDANSETRON 4 MG: 2 INJECTION INTRAMUSCULAR; INTRAVENOUS at 08:07

## 2023-07-07 RX ADMIN — SODIUM CHLORIDE, POTASSIUM CHLORIDE, SODIUM LACTATE AND CALCIUM CHLORIDE 1000 ML: 600; 310; 30; 20 INJECTION, SOLUTION INTRAVENOUS at 08:07

## 2023-07-08 LAB
ANION GAP SERPL CALC-SCNC: 12 MMOL/L (ref 8–16)
BASOPHILS # BLD AUTO: 0.03 K/UL (ref 0–0.2)
BASOPHILS NFR BLD: 0.6 % (ref 0–1.9)
BUN SERPL-MCNC: 11 MG/DL (ref 8–23)
CALCIUM SERPL-MCNC: 9.5 MG/DL (ref 8.7–10.5)
CHLORIDE SERPL-SCNC: 104 MMOL/L (ref 95–110)
CO2 SERPL-SCNC: 26 MMOL/L (ref 23–29)
CREAT SERPL-MCNC: 0.8 MG/DL (ref 0.5–1.4)
DIFFERENTIAL METHOD: ABNORMAL
EOSINOPHIL # BLD AUTO: 0.1 K/UL (ref 0–0.5)
EOSINOPHIL NFR BLD: 2.5 % (ref 0–8)
ERYTHROCYTE [DISTWIDTH] IN BLOOD BY AUTOMATED COUNT: 13.8 % (ref 11.5–14.5)
EST. GFR  (NO RACE VARIABLE): >60 ML/MIN/1.73 M^2
GLUCOSE SERPL-MCNC: 139 MG/DL (ref 70–110)
HCT VFR BLD AUTO: 36.8 % (ref 37–48.5)
HGB BLD-MCNC: 11.6 G/DL (ref 12–16)
IMM GRANULOCYTES # BLD AUTO: 0.02 K/UL (ref 0–0.04)
IMM GRANULOCYTES NFR BLD AUTO: 0.4 % (ref 0–0.5)
LYMPHOCYTES # BLD AUTO: 1.1 K/UL (ref 1–4.8)
LYMPHOCYTES NFR BLD: 21.8 % (ref 18–48)
MAGNESIUM SERPL-MCNC: 1.5 MG/DL (ref 1.6–2.6)
MCH RBC QN AUTO: 34.7 PG (ref 27–31)
MCHC RBC AUTO-ENTMCNC: 31.5 G/DL (ref 32–36)
MCV RBC AUTO: 110 FL (ref 82–98)
MONOCYTES # BLD AUTO: 0.5 K/UL (ref 0.3–1)
MONOCYTES NFR BLD: 9 % (ref 4–15)
NEUTROPHILS # BLD AUTO: 3.4 K/UL (ref 1.8–7.7)
NEUTROPHILS NFR BLD: 65.7 % (ref 38–73)
NRBC BLD-RTO: 0 /100 WBC
PHOSPHATE SERPL-MCNC: 3.6 MG/DL (ref 2.7–4.5)
PLATELET # BLD AUTO: 67 K/UL (ref 150–450)
PMV BLD AUTO: 10.9 FL (ref 9.2–12.9)
POTASSIUM SERPL-SCNC: 4.6 MMOL/L (ref 3.5–5.1)
RBC # BLD AUTO: 3.34 M/UL (ref 4–5.4)
SODIUM SERPL-SCNC: 142 MMOL/L (ref 136–145)
WBC # BLD AUTO: 5.23 K/UL (ref 3.9–12.7)

## 2023-07-08 PROCEDURE — 25500020 PHARM REV CODE 255

## 2023-07-08 PROCEDURE — 63600175 PHARM REV CODE 636 W HCPCS

## 2023-07-08 PROCEDURE — 84100 ASSAY OF PHOSPHORUS: CPT | Performed by: STUDENT IN AN ORGANIZED HEALTH CARE EDUCATION/TRAINING PROGRAM

## 2023-07-08 PROCEDURE — 63600175 PHARM REV CODE 636 W HCPCS: Performed by: STUDENT IN AN ORGANIZED HEALTH CARE EDUCATION/TRAINING PROGRAM

## 2023-07-08 PROCEDURE — 83735 ASSAY OF MAGNESIUM: CPT | Performed by: STUDENT IN AN ORGANIZED HEALTH CARE EDUCATION/TRAINING PROGRAM

## 2023-07-08 PROCEDURE — 85025 COMPLETE CBC W/AUTO DIFF WBC: CPT | Performed by: STUDENT IN AN ORGANIZED HEALTH CARE EDUCATION/TRAINING PROGRAM

## 2023-07-08 PROCEDURE — 20600001 HC STEP DOWN PRIVATE ROOM

## 2023-07-08 PROCEDURE — 25000003 PHARM REV CODE 250

## 2023-07-08 PROCEDURE — 80048 BASIC METABOLIC PNL TOTAL CA: CPT | Performed by: STUDENT IN AN ORGANIZED HEALTH CARE EDUCATION/TRAINING PROGRAM

## 2023-07-08 RX ORDER — MAGNESIUM SULFATE HEPTAHYDRATE 40 MG/ML
2 INJECTION, SOLUTION INTRAVENOUS ONCE
Status: COMPLETED | OUTPATIENT
Start: 2023-07-08 | End: 2023-07-09

## 2023-07-08 RX ORDER — LIDOCAINE 50 MG/G
1 PATCH TOPICAL
Status: DISCONTINUED | OUTPATIENT
Start: 2023-07-08 | End: 2023-07-09 | Stop reason: HOSPADM

## 2023-07-08 RX ORDER — GABAPENTIN 300 MG/1
300 CAPSULE ORAL 3 TIMES DAILY
Status: DISCONTINUED | OUTPATIENT
Start: 2023-07-08 | End: 2023-07-08

## 2023-07-08 RX ORDER — MAGNESIUM SULFATE HEPTAHYDRATE 40 MG/ML
2 INJECTION, SOLUTION INTRAVENOUS ONCE
Status: COMPLETED | OUTPATIENT
Start: 2023-07-08 | End: 2023-07-08

## 2023-07-08 RX ORDER — MORPHINE SULFATE 2 MG/ML
1 INJECTION, SOLUTION INTRAMUSCULAR; INTRAVENOUS ONCE
Status: COMPLETED | OUTPATIENT
Start: 2023-07-08 | End: 2023-07-08

## 2023-07-08 RX ORDER — ACETAMINOPHEN 10 MG/ML
1000 INJECTION, SOLUTION INTRAVENOUS EVERY 8 HOURS
Status: DISCONTINUED | OUTPATIENT
Start: 2023-07-08 | End: 2023-07-09 | Stop reason: HOSPADM

## 2023-07-08 RX ADMIN — MAGNESIUM SULFATE 2 G: 2 INJECTION INTRAVENOUS at 08:07

## 2023-07-08 RX ADMIN — MORPHINE SULFATE 1 MG: 2 INJECTION, SOLUTION INTRAMUSCULAR; INTRAVENOUS at 10:07

## 2023-07-08 RX ADMIN — ACETAMINOPHEN 1000 MG: 10 INJECTION INTRAVENOUS at 08:07

## 2023-07-08 RX ADMIN — LIDOCAINE 1 PATCH: 50 PATCH CUTANEOUS at 10:07

## 2023-07-08 RX ADMIN — ENOXAPARIN SODIUM 40 MG: 40 INJECTION SUBCUTANEOUS at 04:07

## 2023-07-08 RX ADMIN — DIATRIZOATE MEGLUMINE AND DIATRIZOATE SODIUM 50 ML: 660; 100 LIQUID ORAL; RECTAL at 02:07

## 2023-07-08 RX ADMIN — SODIUM CHLORIDE, POTASSIUM CHLORIDE, SODIUM LACTATE AND CALCIUM CHLORIDE: 600; 310; 30; 20 INJECTION, SOLUTION INTRAVENOUS at 12:07

## 2023-07-08 RX ADMIN — SODIUM CHLORIDE, POTASSIUM CHLORIDE, SODIUM LACTATE AND CALCIUM CHLORIDE: 600; 310; 30; 20 INJECTION, SOLUTION INTRAVENOUS at 06:07

## 2023-07-08 RX ADMIN — MAGNESIUM SULFATE 2 G: 2 INJECTION INTRAVENOUS at 10:07

## 2023-07-08 RX ADMIN — METHOCARBAMOL 500 MG: 100 INJECTION, SOLUTION INTRAMUSCULAR; INTRAVENOUS at 06:07

## 2023-07-08 NOTE — ED NOTES
Patient identifiers verified and correct for Anayeli Judd.    LOC: The patient is awake, alert and oriented x 4. Pt is speaking appropriately, no slurred speech.  APPEARANCE: Patient resting comfortably and in no acute distress. Pt is clean and well groomed. No JVD visible. Pt reports pain level of 0.  SKIN: Skin is warm dry and intact, and color is consistent with ethnicity. No tenting observed and capillary refill <3 seconds. No clubbing noted to nail beds. No breakdown or brusing visible and mucus membranes moist and acyanotic.  MUSCULOSKELETAL: Full range of motion present in all extremities. Hand  equal and leg strength strong +2 bilaterally.  RESPIRATORY: Airway is open and patent. Respirations-unlabored, regular rate, equal bilaterally on inspiration and expiration. No accessory muscle use noted. Lungs clear to auscultation in all fields bilaterally anterior and posterior.   CARDIAC: Patient has regular heart rate and rhythm.    ABDOMEN: tender upon palpation. Constipation. Nausea.   NEUROLOGIC: Eyes open spontaneously and facial expression symmetrical. Pt behavior appropriate to situation, and pt follows commands.  Pt reports sensation present in all extremities when touched with a finger.  : No complaints of frequency, burning, urgency or blood in the urine.

## 2023-07-08 NOTE — ED TRIAGE NOTES
Anayeli Judd, a 78 y.o. female presents to the ED w/ complaint of abdominal pain x all day. Hx bowel obstructions -- last episode April relieved with NG tube. Nausea. Constipation.     Triage note:  Chief Complaint   Patient presents with    Abdominal Pain     Hx of sbo, nausea     Review of patient's allergies indicates:   Allergen Reactions    Codeine Itching and Nausea And Vomiting    Dilaudid [hydromorphone (bulk)] Other (See Comments)     Oversedating, head burning. Pt prefers to avoid.       Percocet [oxycodone-acetaminophen] Itching    Sulfa (sulfonamide antibiotics) Itching and Nausea And Vomiting           Latex, natural rubber Rash     Past Medical History:   Diagnosis Date    Abdominal pain     Allergic rhinitis     Arthritis     Blood platelet disorder     Blood platelet disorder     Blood transfusion     during delivery and     Bowel obstruction     Cervical radiculopathy     followed by dr cloud    Colon cancer     transverse colon; resected; Stage IIA (pT3 pN0 MX)    Degenerative joint disease (DJD) of lumbar spine     Diabetes mellitus     Diarrhea     Falls 2020    Family history of breast cancer     Family history of colon cancer     Fatty liver     GERD (gastroesophageal reflux disease)     History of shingles     Hyperlipidemia     Hypomagnesemia     Hypothyroidism     Irritable bowel syndrome     Microscopic colitis     treated     Migraine     Myelodysplastic syndrome     Raynaud phenomenon     Raynaud's disease     Squamous cell carcinoma of skin     Type 2 diabetes mellitus

## 2023-07-08 NOTE — CONSULTS
[facility] Dept  General Surgery  Consult Note    Inpatient consult to General Surgery  Consult performed by: Katie Segovia MD  Consult ordered by: Katie Segovia MD  Reason for consult: pSBO  Assessment/Recommendations: 77yo F w/PMH numerous abdominal surgeries and prior small bowel obstructions as well as colon cancer s/p resection in 2011 most recently admitted to [facility] in April 2023 for similar problem now presents to the ED with constipation, nausea, abdominal pain and CT consistent with partial small bowel obstruction    - admit to general surgery  - NPO  - NGT  - IVF  - daily labs, replace electrolytes as needed, keep Mg>2  - plan for gastrografin challenge today  - symptoms compounded by chronic constipation, complicated GI past and follows with GI as an outpatient - will set her up to follow up with them upon discharge      Subjective:     Chief Complaint/Reason for Admission: pSBO    History of Present Illness: Mr. Judd is an 77yo F w/PMH numerous abdominal surgeries and prior small bowel obstructions as well as colon cancer s/p resection in 2011 most recently admitted to [facility] in April 2023 for similar problem now presents to the ED with constipation, nausea, abdominal pain and CT consistent with partial small bowel obstruction. Patient notes that several days ago she developed fecal incontinence, decreased appetite, abdominal distention with cramping diffuse abdominal pain. She has had several SBOs treated both surgically and medically and states that these are her usual symptoms for early SBOs. She is having liquid Bms which is normal for her, most recently this morning. She last passed gas this morning as well. Does endorse a history of bloody diarrhea, follows with GI for her functional colonic problems. Most recent admission was in April for pSBO which was managed medically. In 2022 she underwent an ex lap with Dr. Barriga for bowel obstruction which was aborted due to a frozen  abdomen. Otherwise, she has been operated on by Dr. Ward in 2017 and in 2014 by Dr. Silvestre both for SBOs. Noted to have a lot of intraabdominal adhesions in all three cases.    In the ED, workup showed distention of the small bowel with no definite transition point, stomach grossly dilated on imaging. WBC WNL. VSS.    PSH: appendectomy, exploratory laparotomy x3 for SBO (2014, Dr. Silvestre; 2017 Dr. Fletcher; 2022 Dr. Barriga)    No current facility-administered medications on file prior to encounter.     Current Outpatient Medications on File Prior to Encounter   Medication Sig    acetaminophen (TYLENOL) 650 MG TbSR Take 1,300 mg by mouth 2 (two) times a day.    ascorbic acid, vitamin C, (VITAMIN C) 1000 MG tablet Take 1,000 mg by mouth once daily.    atorvastatin (LIPITOR) 40 MG tablet Take 40 mg by mouth once daily.    biotin 10,000 mcg TbDL Take 1 tablet by mouth once daily.     calcium-vitamin D3 (OS-KASSANDRA 500 + D3) 500 mg-5 mcg (200 unit) per tablet Take 1 tablet by mouth once daily.    conjugated estrogens (PREMARIN) vaginal cream INSERT 0.5 GRAM VAGINALLY 2 TIMES A WEEK    cranberry extract 200 mg Cap Take 1 capsule by mouth once daily.    donepeziL (ARICEPT) 10 MG tablet Take 1 tablet (10 mg total) by mouth every evening.    DULoxetine (CYMBALTA) 30 MG capsule TAKE 1 CAPSULE BY MOUTH TWICE  DAILY    ferrous sulfate 324 mg (65 mg iron) TbEC Take 1 tablet (324 mg total) by mouth once daily.    flash glucose scanning reader (FREESTYLE EFREN 14 DAY READER) Misc Check blood glucose level 3 times daily.    flash glucose sensor (FREESTYLE EFREN 14 DAY SENSOR) Kit 1 application by Misc.(Non-Drug; Combo Route) route every 14 (fourteen) days. Disp 30 or 90 day refill    HYDROcodone-acetaminophen (NORCO) 5-325 mg per tablet Take 1 tablet by mouth every 6 (six) hours as needed for Pain.    hydrocortisone 2.5 % cream Apply topically 2 (two) times daily as needed.    levothyroxine (SYNTHROID) 75 MCG tablet TAKE 1  TABLET(75 MCG) BY MOUTH BEFORE BREAKFAST    levothyroxine (SYNTHROID) 75 MCG tablet Take 1 tablet (75 mcg total) by mouth before breakfast.    LIDOcaine (LIDODERM) 5 % Apply 1 to 3 patches to back daily. Remove & Discard patches within 12 hours.    linaGLIPtin (TRADJENTA) 5 mg Tab tablet Take 1 tablet (5 mg total) by mouth once daily.    metFORMIN (GLUCOPHAGE) 500 MG tablet Take 1 tablet (500 mg total) by mouth 2 (two) times daily with meals.    montelukast (SINGULAIR) 10 mg tablet TAKE 1 TABLET BY MOUTH  DAILY    ondansetron (ZOFRAN-ODT) 8 MG TbDL Take 1 tablet (8 mg total) by mouth every 8 (eight) hours as needed (nausea).    pantoprazole (PROTONIX) 20 MG tablet Take 1 tablet (20 mg total) by mouth 2 (two) times a day.    polyethylene glycol (GLYCOLAX) 17 gram PwPk Take 17 g by mouth once daily.    triamcinolone acetonide 0.1% (KENALOG) 0.1 % paste APPLY TO THE AFFECTED AREA WITH EVERY-TIP THREE TIMES DAILY    ubrogepant (UBRELVY) 100 mg tablet Take 1 tablet daily as needed for migraine headache. May take 1 additional tablet 2 hours later if needed.    valACYclovir (VALTREX) 1000 MG tablet Take 1 tablet (1,000 mg total) by mouth once daily.    vitamin D 1000 units Tab Take 1,000 Units by mouth once daily. D3    [DISCONTINUED] hyoscyamine (ANASPAZ,LEVSIN) 0.125 mg Tab TAKE 1 TABLET(125 MCG) BY MOUTH EVERY 8 HOURS AS NEEDED FOR URGENCY       Review of patient's allergies indicates:   Allergen Reactions    Codeine Itching and Nausea And Vomiting    Dilaudid [hydromorphone (bulk)] Other (See Comments)     Oversedating, head burning. Pt prefers to avoid.       Percocet [oxycodone-acetaminophen] Itching    Sulfa (sulfonamide antibiotics) Itching and Nausea And Vomiting           Latex, natural rubber Rash       Past Medical History:   Diagnosis Date    Abdominal pain     Allergic rhinitis     Arthritis     Blood platelet disorder     Blood platelet disorder     Blood transfusion     during delivery and      Bowel obstruction     Cervical radiculopathy     followed by dr cloud    Colon cancer     transverse colon; resected; Stage IIA (pT3 pN0 MX)    Degenerative joint disease (DJD) of lumbar spine     Diabetes mellitus     Diarrhea     Falls 2020    Family history of breast cancer     Family history of colon cancer     Fatty liver     GERD (gastroesophageal reflux disease)     History of shingles     Hyperlipidemia     Hypomagnesemia     Hypothyroidism     Irritable bowel syndrome     Microscopic colitis     treated     Migraine     Myelodysplastic syndrome     Raynaud phenomenon     Raynaud's disease     Squamous cell carcinoma of skin     Type 2 diabetes mellitus      Past Surgical History:   Procedure Laterality Date    ADENOIDECTOMY      APPENDECTOMY      BACK SURGERY      CARPAL TUNNEL RELEASE      bilateral      SECTION      CHOLECYSTECTOMY  1965    COLECTOMY  2011    Transverse colon resection by Dr. Aguirre    COLONOSCOPY N/A 2017    Procedure: COLONOSCOPY;  Surgeon: Manjit Alvarez MD;  Location: [facility] (4TH FLR);  Service: Endoscopy;  Laterality: N/A;    COLONOSCOPY N/A 2019    Procedure: COLONOSCOPY;  Surgeon: Ramiro Jefferson MD;  Location: [facility] (4TH FLR);  Service: Endoscopy;  Laterality: N/A;  PM prep    COLONOSCOPY N/A 2022    Procedure: COLONOSCOPY;  Surgeon: Ramiro Jefferson MD;  Location: [facility] (2ND FLR);  Service: Endoscopy;  Laterality: N/A;  EGD and colonoscopy in 6-8 weeks from now     states has constipation. -extended Golytely prep    CYSTOSCOPY N/A 2021    Procedure: CYSTOSCOPY;  Surgeon: Viridiana Valenzuela MD;  [facility];  Service: Urology;  Laterality: N/A;    ENDOSCOPIC ULTRASOUND OF UPPER GASTROINTESTINAL TRACT N/A 2018    Procedure: ULTRASOUND, UPPER GI TRACT, ENDOSCOPIC;  Surgeon: Jose Hess MD;  Location: [facility];  Service: Endoscopy;  Laterality: N/A;    ENDOSCOPIC ULTRASOUND OF UPPER  GASTROINTESTINAL TRACT N/A 01/23/2019    Procedure: ULTRASOUND, UPPER GI TRACT, ENDOSCOPIC;  Surgeon: Jose Hess MD;  Location: [facility] ENDO (2ND FLR);  Service: Endoscopy;  Laterality: N/A;    ESOPHAGOGASTRODUODENOSCOPY N/A 11/16/2018    Procedure: EGD (ESOPHAGOGASTRODUODENOSCOPY);  Surgeon: Angelo Reynolds MD;  Location: [facility] ENDO (2ND FLR);  Service: Endoscopy;  Laterality: N/A;    ESOPHAGOGASTRODUODENOSCOPY N/A 06/26/2019    Procedure: EGD (ESOPHAGOGASTRODUODENOSCOPY);  Surgeon: Ramiro Jefferson MD;  Location: [facility] ENDO (4TH FLR);  Service: Endoscopy;  Laterality: N/A;    ESOPHAGOGASTRODUODENOSCOPY N/A 5/4/2022    Procedure: EGD (ESOPHAGOGASTRODUODENOSCOPY);  Surgeon: Ramiro Jefferson MD;  Location: [facility] ENDO (2ND FLR);  Service: Endoscopy;  Laterality: N/A;  fully vaccinated-GT    EYE SURGERY      Cataract Removal    FLUOROSCOPIC URODYNAMIC STUDY N/A 11/09/2021    Procedure: URODYNAMIC STUDY, FLUOROSCOPIC;  Surgeon: Viridiana Valenzuela MD;  Location: [facility] OR 1ST FLR;  Service: Urology;  Laterality: N/A;  90 minutes     HYSTERECTOMY      JOINT REPLACEMENT      posterolateral fusion with autograft bone and [place] mineralized bone matrix  02/01/2013    at [facility] for lumbar spine stenosis    SMALL INTESTINE SURGERY      SPINE SURGERY      TOE SURGERY      TONSILLECTOMY      TRANSFORAMINAL EPIDURAL INJECTION OF STEROID Bilateral 12/21/2022    Procedure: Injection,steroid,epidural, Todd L5/S1 TF CONTRAST DIRECT REFERRAL;  Surgeon: Toni Osorio MD;  Location: [facility] PAIN MGT;  Service: Pain Management;  Laterality: Bilateral;    TRIGGER FINGER RELEASE       Family History       Problem Relation (Age of Onset)    Alcohol abuse Brother, Brother, Brother    Arthritis Daughter, Brother, Brother    Asthma Daughter, Daughter    Bladder Cancer Mother    Breast cancer Sister (79)    Cataracts Mother    Colon cancer Father, Brother (70)    Depression Daughter, Daughter, Daughter    Glaucoma Mother    Heart disease Father  (50), Mother    Hyperlipidemia Mother    Hypertension Daughter    Kidney disease Mother    Parkinsonism Brother    Stroke Daughter (40), Daughter          Tobacco Use    Smoking status: Never    Smokeless tobacco: Never   Substance and Sexual Activity    Alcohol use: Not Currently     Comment: socially, rarely    Drug use: Never    Sexual activity: Not Currently     Review of Systems   Constitutional:  Positive for chills. Negative for fever.   Respiratory:  Positive for shortness of breath. Negative for chest tightness.    Cardiovascular:  Negative for chest pain.   Gastrointestinal:  Positive for abdominal distention, abdominal pain, blood in stool, constipation, diarrhea and nausea. Negative for vomiting.   Genitourinary:  Negative for dysuria and hematuria.   Musculoskeletal:  Positive for arthralgias and back pain.   Skin:  Negative for wound.   Neurological:  Positive for headaches.   Psychiatric/Behavioral:  Negative for agitation.    Objective:     Vital Signs (Most Recent):  Temp: 97.7 °F (36.5 °C) (07/07/23 1818)  Pulse: 60 (07/08/23 0027)  Resp: 19 (07/08/23 0027)  BP: 124/60 (07/08/23 0027)  SpO2: 98 % (07/08/23 0027) Vital Signs (24h Range):  Temp:  [97.7 °F (36.5 °C)-97.8 °F (36.6 °C)] 97.7 °F (36.5 °C)  Pulse:  [60-74] 60  Resp:  [18-19] 19  SpO2:  [96 %-98 %] 98 %  BP: (120-136)/(60-65) 124/60     Weight: 61.2 kg (135 lb)  Body mass index is 24.69 kg/m².      Intake/Output Summary (Last 24 hours) at 7/8/2023 0110  Last data filed at 7/7/2023 2341  Gross per 24 hour   Intake 1000 ml   Output --   Net 1000 ml       Physical Exam  Constitutional:       Appearance: Normal appearance.   HENT:      Head: Normocephalic and atraumatic.   Cardiovascular:      Rate and Rhythm: Normal rate.   Pulmonary:      Effort: Pulmonary effort is normal.   Abdominal:      Palpations: Abdomen is soft.      Comments: Mildly distended; abdomen very mildly tender throughout   Skin:     General: Skin is warm and dry.       Capillary Refill: Capillary refill takes less than 2 seconds.   Neurological:      General: No focal deficit present.      Mental Status: She is alert.       Significant Labs:  BMP:   Recent Labs   Lab 07/07/23 1937   *      K 4.4      CO2 25   BUN 13   CREATININE 1.0   CALCIUM 10.4     CBC:   Recent Labs   Lab 07/07/23 1937   WBC 9.07   RBC 3.83*   HGB 13.7   HCT 40.8   PLT 90*   *   MCH 35.8*   MCHC 33.6       Significant Diagnostics:  I have reviewed all pertinent imaging results/findings within the past 24 hours.    Assessment/Plan:   77yo F w/PMH numerous abdominal surgeries and prior small bowel obstructions as well as colon cancer s/p resection in 2011 most recently [facility]23 for similar problem now presents to the ED with constipation, nausea, abdominal pain and CT consistent with partial small bowel obstruction    - admit to general surgery  - NPO  - NGT  - IVF  - daily labs, replace electrolytes as needed, keep Mg>2  - plan for gastrografin challenge today  - symptoms compounded by chronic constipation, complicated GI past and follows with GI as an outpatient - will set her up to follow up with them upon discharge      Thank you for your consult. I will follow-up with patient. Please contact us if you have any additional questions.    Katie Segovia MD  General Surgery  [facility]

## 2023-07-08 NOTE — ED NOTES
Pt resting in bed. Family member at bedside. Pt is AAO. Resps even and unlabored. LR infusing via dial a flow. NG in placed and connected to continuous suction. Suction settings changed to low intermittent suction per orders. Awaiting further orders. Will continue to monitor.

## 2023-07-08 NOTE — H&P
Please see consult note dated 7/8/23 for full H&P.    Katie Segovia MD  Pager: [phone]  General Surgery PGY-5  [facility]

## 2023-07-08 NOTE — ED PROVIDER NOTES
Encounter Date: 2023       History     Chief Complaint   Patient presents with    Abdominal Pain     Hx of sbo, nausea     78-year-old female with multiple comorbidities including history of colon cancer, DM 2, GERD, hypothyroidism, myelodysplastic syndrome, recurrent bowel obstructions presents for abdominal pain with nausea and vomiting for several days duration.  She is concerned about a bowel obstruction.  She is not able to pass gas.  She did have some watery stool this morning but has not had a true bowel movement.  Also reports low back pain and sciatica.  She denies chest pain, fever or urinary symptoms.    Review of patient's allergies indicates:   Allergen Reactions    Codeine Itching and Nausea And Vomiting    Dilaudid [hydromorphone (bulk)] Other (See Comments)     Oversedating, head burning. Pt prefers to avoid.       Percocet [oxycodone-acetaminophen] Itching    Sulfa (sulfonamide antibiotics) Itching and Nausea And Vomiting           Latex, natural rubber Rash     Past Medical History:   Diagnosis Date    Abdominal pain     Allergic rhinitis     Arthritis     Blood platelet disorder     Blood platelet disorder     Blood transfusion     during delivery and     Bowel obstruction     Cervical radiculopathy     followed by dr cloud    Colon cancer     transverse colon; resected; Stage IIA (pT3 pN0 MX)    Degenerative joint disease (DJD) of lumbar spine     Diabetes mellitus     Diarrhea     Falls 2020    Family history of breast cancer     Family history of colon cancer     Fatty liver     GERD (gastroesophageal reflux disease)     History of shingles     Hyperlipidemia     Hypomagnesemia     Hypothyroidism     Irritable bowel syndrome     Microscopic colitis     treated     Migraine     Myelodysplastic syndrome     Raynaud phenomenon     Raynaud's disease     Squamous cell carcinoma of skin     Type 2 diabetes mellitus      Past Surgical History:   Procedure Laterality Date     ADENOIDECTOMY      APPENDECTOMY      BACK SURGERY      CARPAL TUNNEL RELEASE      bilateral      SECTION      CHOLECYSTECTOMY  1965    COLECTOMY  2011    Transverse colon resection by Dr. Aguirre    COLONOSCOPY N/A 2017    Procedure: COLONOSCOPY;  Surgeon: Manjit Alvarez MD;  Location: [facility] (4TH FLR);  Service: Endoscopy;  Laterality: N/A;    COLONOSCOPY N/A 2019    Procedure: COLONOSCOPY;  Surgeon: Ramiro Jefferson MD;  Location: [facility] (4TH FLR);  Service: Endoscopy;  Laterality: N/A;  PM prep    COLONOSCOPY N/A 2022    Procedure: COLONOSCOPY;  Surgeon: Ramiro Jefferson MD;  Location: [facility] (2ND FLR);  Service: Endoscopy;  Laterality: N/A;  EGD and colonoscopy in 6-8 weeks from now     states has constipation. -extended Golytely prep    CYSTOSCOPY N/A 2021    Procedure: CYSTOSCOPY;  Surgeon: Viridiana Valenzuela MD;  Location: [facility] 1ST FLR;  Service: Urology;  Laterality: N/A;    ENDOSCOPIC ULTRASOUND OF UPPER GASTROINTESTINAL TRACT N/A 2018    Procedure: ULTRASOUND, UPPER GI TRACT, ENDOSCOPIC;  Surgeon: Jose Hess MD;  Location: [facility];  Service: Endoscopy;  Laterality: N/A;    ENDOSCOPIC ULTRASOUND OF UPPER GASTROINTESTINAL TRACT N/A 2019    Procedure: ULTRASOUND, UPPER GI TRACT, ENDOSCOPIC;  Surgeon: Jose Hess MD;  Location: [facility] (2ND FLR);  Service: Endoscopy;  Laterality: N/A;    ESOPHAGOGASTRODUODENOSCOPY N/A 2018    Procedure: EGD (ESOPHAGOGASTRODUODENOSCOPY);  Surgeon: Angelo Reynolds MD;  Location: [facility] (2ND FLR);  Service: Endoscopy;  Laterality: N/A;    ESOPHAGOGASTRODUODENOSCOPY N/A 2019    Procedure: EGD (ESOPHAGOGASTRODUODENOSCOPY);  Surgeon: Ramiro Jefferson MD;  Location: [facility] (4TH FLR);  Service: Endoscopy;  Laterality: N/A;    ESOPHAGOGASTRODUODENOSCOPY N/A 2022    Procedure: EGD (ESOPHAGOGASTRODUODENOSCOPY);  Surgeon: Ramiro Jefferson MD;  Location: [facility] ([address] [place] [address]);  Service:  Endoscopy;  Laterality: N/A;  fully vaccinated-GT    EYE SURGERY      Cataract Removal    FLUOROSCOPIC URODYNAMIC STUDY N/A 11/09/2021    Procedure: URODYNAMIC STUDY, FLUOROSCOPIC;  Surgeon: Viridiana Valenzuela MD;  Location: [facility] OR [address] [place] [address];  Service: Urology;  Laterality: N/A;  90 minutes     HYSTERECTOMY      JOINT REPLACEMENT      posterolateral fusion with autograft bone and [place] mineralized bone matrix  02/01/2013    at [facility] for lumbar spine stenosis    SMALL INTESTINE SURGERY      SPINE SURGERY      TOE SURGERY      TONSILLECTOMY      TRANSFORAMINAL EPIDURAL INJECTION OF STEROID Bilateral 12/21/2022    Procedure: Injection,steroid,epidural, Todd L5/S1 TF CONTRAST DIRECT REFERRAL;  Surgeon: Toni Osorio MD;  Location: [facility] PAIN MGT;  Service: Pain Management;  Laterality: Bilateral;    TRIGGER FINGER RELEASE       Family History   Problem Relation Age of Onset    Heart disease Father 50        Mi age 50    Colon cancer Father     Bladder Cancer Mother         non smoker    Cataracts Mother     Glaucoma Mother     Heart disease Mother     Hyperlipidemia Mother     Kidney disease Mother     Breast cancer Sister 79    Arthritis Daughter     Asthma Daughter     Depression Daughter     Hypertension Daughter     Stroke Daughter 40    Arthritis Brother     Colon cancer Brother 70    Alcohol abuse Brother     Parkinsonism Brother     Alcohol abuse Brother     Depression Daughter     Stroke Daughter     Alcohol abuse Brother     Arthritis Brother     Asthma Daughter     Depression Daughter     Celiac disease Neg Hx     Cirrhosis Neg Hx     Colon polyps Neg Hx     Crohn's disease Neg Hx     Cystic fibrosis Neg Hx     Esophageal cancer Neg Hx     Hemochromatosis Neg Hx     Inflammatory bowel disease Neg Hx     Irritable bowel syndrome Neg Hx     Liver cancer Neg Hx     Liver disease Neg Hx     Rectal cancer Neg Hx     Stomach cancer Neg Hx     Ulcerative colitis Neg Hx     Eliazar's disease Neg Hx     Amblyopia Neg Hx      Blindness Neg Hx     Macular degeneration Neg Hx     Retinal detachment Neg Hx     Strabismus Neg Hx     Melanoma Neg Hx      Social History     Tobacco Use    Smoking status: Never    Smokeless tobacco: Never   Substance Use Topics    Alcohol use: Not Currently     Comment: socially, rarely    Drug use: Never     Review of Systems    Physical Exam     Initial Vitals [07/07/23 1629]   BP Pulse Resp Temp SpO2   120/65 74 18 97.8 °F (36.6 °C) 98 %      MAP       --         Physical Exam    Nursing note and vitals reviewed.  Constitutional: She appears well-developed and well-nourished. She is not diaphoretic. No distress.   HENT:   Head: Normocephalic and atraumatic.   Neck:   Normal range of motion.  Cardiovascular:  Normal rate, regular rhythm, normal heart sounds and intact distal pulses.     Exam reveals no gallop and no friction rub.       No murmur heard.  Pulmonary/Chest: Breath sounds normal. No respiratory distress. She has no wheezes. She has no rhonchi. She has no rales. She exhibits no tenderness.   Abdominal: Abdomen is soft. She exhibits distension. She exhibits no mass. There is abdominal tenderness. There is no rebound and no guarding.   Musculoskeletal:         General: Normal range of motion.      Cervical back: Normal range of motion.     Neurological: She is alert and oriented to person, place, and time.   Skin: Skin is warm and dry.   Psychiatric: She has a normal mood and affect.       ED Course   Procedures  Labs Reviewed   CBC W/ AUTO DIFFERENTIAL - Abnormal; Notable for the following components:       Result Value    RBC 3.83 (*)      (*)     MCH 35.8 (*)     Platelets 90 (*)     Gran % 80.2 (*)     Lymph % 11.2 (*)     All other components within normal limits   COMPREHENSIVE METABOLIC PANEL - Abnormal; Notable for the following components:    Glucose 197 (*)     AST 67 (*)     ALT 52 (*)     eGFR 57.0 (*)     All other components within normal limits   LACTIC ACID, PLASMA - Abnormal;  Notable for the following components:    Lactate (Lactic Acid) 2.8 (*)     All other components within normal limits   URINALYSIS, REFLEX TO URINE CULTURE - Abnormal; Notable for the following components:    Leukocytes, UA Trace (*)     All other components within normal limits    Narrative:     Specimen Source->Urine   URINALYSIS MICROSCOPIC - Abnormal; Notable for the following components:    Hyaline Casts, UA 7 (*)     All other components within normal limits    Narrative:     Specimen Source->Urine   HIV 1 / 2 ANTIBODY    Narrative:     Release to patient->Immediate   HEPATITIS C ANTIBODY    Narrative:     Release to patient->Immediate   LIPASE     EKG Readings: (Independently Interpreted)   Initial Reading: No STEMI. Previous EKG: Compared with most recent EKG Previous EKG Date: 4/3/2023. Rhythm: Sinus Arrhythmia. Heart Rate: 66. Ectopy: No Ectopy. ST Segments: Normal ST Segments. Clinical Impression: Normal Sinus Rhythm     Imaging Results               CT Abdomen Pelvis With Contrast (Final result)  Result time 07/07/23 22:01:19      Final result by Judson Ellison MD (07/07/23 22:01:19)                   Impression:      Marked distension of the small bowel, as detailed above, highly concerning for mechanical small bowel obstruction.  No findings to suggest pneumatosis or perforation.  No definite transition point identified, though there are findings suggest above adhesion formation.  Surgical consultation is recommended.    Not fully detailed above: Overall, the colon lumen is less distended than on 04/03/2023.  This makes it difficult to exclude some abnormal mural or mucosal thickening, such as could be seen with colitis.  The newly diffusely fluid-filled appearance of the descending and rectosigmoid colon suggests a diarrheal illness (whether related to colitis or other etiology).  Correlate clinically.    Pancreatic duct appears dilated, to approximately 7 mm, in the pancreatic head, as it approaches  the ampulla.  Etiology uncertain, underlying neoplasm not excluded.  Correlate clinically, and with follow-up outpatient MRCP or ERCP.    Small hiatal hernia.    Probable esophagitis and gastroduodenitis.    Prominent brightly enhancing redundant mucosa at the gastric cardia; neoplasm less likely.  Correlate clinically.  Consider outpatient EGD.    Other findings as detailed in the body of the report.    This report was flagged in Epic as abnormal.    Electronically signed by resident: Jamia Song  Date:    07/07/2023  Time:    20:24    Electronically signed by: Judson Ellison  Date:    07/07/2023  Time:    22:01               Narrative:    EXAMINATION:  CT ABDOMEN PELVIS WITH CONTRAST    CLINICAL HISTORY:  Bowel obstruction suspected;    TECHNIQUE:  Axial images of the abdomen and pelvis were acquired after the use of 75 mL [id number] IV contrast.  Coronal and sagittal reconstructions were also obtained    COMPARISON:  CT 04/03/2023    FINDINGS:  Thoracic soft tissues: Partially visualized thoracic soft tissues appear unremarkable.    Heart: No effusion apparent in the visualized caudal portions of the pericardial sac.  There are some calcifications in the partially visualized aortic valve, likely including some leaflet calcifications (a CT marker for underlying valvular aortic stenosis).    Lungs: Lung bases are clear.  No basilar pneumothorax or pleural effusion.    Esophagus: Small hiatal hernia.    Stomach and duodenum: Stomach is markedly distended by intraluminal gas and fluid.  There is mucosal hyperenhancement in the distal esophagus, small hiatal hernia and is of the gastric cardia.  There is some redundant mucosa at the gastric cardia (underlying mass not fully excluded).  There is mucosal hyperenhancement in the pylorus, with mild redundant mucosa at the proximal end of the pylorus.    The duodenal bulb is at least partially distended.  The remainder of the duodenum is almost fully collapsed, making it  difficult to exclude some duodenal mucosal thickening.    Liver: Normal in size and contour.  No focal hepatic lesion.    Gallbladder: Surgically absent.    Bile Ducts: 0.7 cm fluid attenuating lesion arising from the common bile duct at the olga hepatis (2-50) which may represent the cystic duct remnant, versus a small biliary cyst.  Mildly prominent intra and extrahepatic biliary ducts, which may be related to prior cholecystectomy.    Spleen: Splenomegaly.    Pancreas: No mass or peripancreatic fat stranding.  Pancreatic atrophy.  The pancreatic duct appears more distended proximally, in the pancreatic head, where it measures up to approximately 7 mm in diameter.    Adrenals: Unremarkable.    Kidneys/Ureters: Normal in size and location. Symmetric parenchymal enhancement.  No hydronephrosis or nephrolithiasis. No ureteral dilatation. Prominent right collecting system, though not significantly changed from 04/03/2023. no hydronephrosis.    Bladder: Bladder is decompressed with mild adjacent stranding.  Prominent indention at the posterior aspect of the bladder, possibly from the vaginal cuff status post hysterectomy.    Reproductive organs: Uterus is surgically absent.  The ovaries are not identified with certainty in or perhaps also surgically absent.    Bowel/Mesentery: Postoperative change of partial bowel resection and partial colectomy.  Multiple fluid and air-filled loops of small bowel.  One loop of small bowel near a surgical suture line in the inferior mid abdomen measures up to 5.3 cm (2-115).  Additional small bowel appears diffusely dilated.  No definite transition point located, though these findings remain concerning for mechanical bowel obstruction.  Similar appearance of multiple small bowel loops closely approximated to the anterior abdominal wall, with soft tissue thickening, concerning for adhesions..  There is inflammatory change and stranding of the distal bowel near the cecum.  Appendix is  surgically absent.  The large bowel is not decompressed, and is normal in caliber.  However, there is increased mucosal or mural thickening thoracic:, most conspicuously in the right colon.    Peritoneum: No intraperitoneal free air or fluid.    Lymph nodes: No intra-abdominal or intrapelvic lymphadenopathy, by size criteria.    Abdominal wall:  Postoperative change of exploratory laparotomy, and multiple prior surgeries, with residual soft tissue thickening along the incision site.    Vasculature: No aneurysm. Dense calcific atherosclerosis involving the abdominal aorta.  Calcific atherosclerosis.    Heterogeneous enhancement of the common femoral, external, and common iliac veins bilaterally is likely related to admixture of opacified returning from the greater saphenous veins and not-yet-opacified blood returning from the femoral veins.  A similar appearance was demonstrated on 04/03/2023.    Bones: No acute fracture. Severe multilevel degenerative changes of the spine.  There is grade 2 anterolisthesis of L5 on S1.  Atrophy of the paraspinal musculature.                                       Medications   lactated ringers infusion (has no administration in time range)   sodium chloride 0.9% flush 10 mL (has no administration in time range)   lactated ringers infusion (has no administration in time range)   enoxaparin injection 40 mg (has no administration in time range)   ondansetron injection 4 mg (has no administration in time range)   prochlorperazine injection Soln 5 mg (has no administration in time range)   ondansetron injection 4 mg (4 mg Intravenous Given 7/7/23 2006)   morphine injection 3 mg (3 mg Intravenous Given 7/7/23 2005)   lactated ringers bolus 1,000 mL (1,000 mLs Intravenous New Bag 7/7/23 2028)   iohexoL (OMNIPAQUE 350) injection 75 mL (75 mLs Intravenous Given 7/7/23 2013)     Medical Decision Making:   History:   Old Medical Records: I decided to obtain old medical records.  Old Records  Summarized: records from clinic visits and records from previous admission(s).       <> Summary of Records: Most recent admission in April for SBO  Initial Assessment:   78-year-old female presenting for abdominal pain with vomiting consistent with prior bowel obstructions.  Her vitals are normal but she appears uncomfortable with a tender distended abdomen  Differential Diagnosis:   SBO   Dehydration  Electrolyte derangement  Pancreatitis   Doubt cardiac cause  Independently Interpreted Test(s):   I have ordered and independently interpreted EKG Reading(s) - see prior notes  Clinical Tests:   Lab Tests: Ordered and Reviewed  Radiological Study: Ordered and Reviewed  Medical Tests: Ordered and Reviewed  ED Management:  Will check labs, give analgesics, antiemetics, fluids, do CT abdomen pelvis and reassess.    Labs notable for mildly elevated lactic acid.  Otherwise unrevealing.  CT concerning for SBO.  Case discussed with Radiology and General surgery, patient will be admitted to general surgery service for further management.  Will order NG tube and LR infusion per surgery recommendations.  Patient and family comfortable with admission.  Other:   I have discussed this case with another health care provider.       <> Summary of the Discussion: See ED course           ED Course as of 07/07/23 2216 Fri Jul 07, 2023 2022 WBC: 9.07 [CC]   2022 CT Abdomen Pelvis With Contrast  Per my independent interpretation, stomach is enlarged, air-fluid levels, suspect due to SBO. Will plane NG tube [CC]   2042 Lactate, Frank(!): 2.8 [CC]   2052 Patient reports feeling better but still has pain and nausea.  Apparently she is responded well to oral CT contrast in the past, will discuss with General surgery when CT results and hold off NG-tube at this time [CC]   2110 Leukocytes, UA(!): Trace [CC]   2145 Case discussed with Radiology, CTA concerning for SBO without clear transition point.  General surgery paged [CC]   6007 Case  discussed with General surgery who recommend placement of NG tube at low intermittent suction and will come evaluate the patient for admission [CC]      ED Course User Index  [CC] Marge Tanner PA-C                 Clinical Impression:   Final diagnoses:  [R10.9] Abdominal pain  [R11.2] Nausea & vomiting  [K56.50] Small bowel obstruction due to adhesions (Primary)        ED Disposition Condition    Admit Stable                Marge Tanner PA-C  07/07/23 1650

## 2023-07-08 NOTE — PLAN OF CARE
[facility]  Initial Discharge Assessment       Primary Care Provider: Darci Guthrie MD    Admission Diagnosis: Small bowel obstruction [K56.609]  Nausea & vomiting [R11.2]  Small bowel obstruction due to adhesions [K56.50]  Abdominal pain [R10.9]    Admission Date: 7/7/2023  Expected Discharge Date:     Transition of Care Barriers: None    Payor: MEDICARE / Plan: MEDICARE PART A & B / Product Type: Government /     Extended Emergency Contact Information  Primary Emergency Contact: Danielle Whitley   [place]  Mobile Phone: [phone]  Relation: Daughter  Secondary Emergency Contact: Kimberly Horner  Address: [address] [address] [facility]  Home Phone: [phone]  Relation: Daughter    Discharge Plan A: Home Health  Discharge Plan B: Skilled Nursing Facility      [id number] - [place], [place] - [address] [place] [address] AT [facility]  [address] [facility] [address]  Phone: [phone] Fax: [phone]    [facility] ([facility] ) - [place], [place] - [address]  [address]  [address]  [facility] [address]  Phone: [phone] Fax: [phone]    SW met with patient and patient's daughter Danielle Whitley at bedside to complete discharge planning assessment.  Patient alert and oriented xs 4.  Patient verified all demographic information on facesheet is correct.  Patient verified PCP is Dr Guthrie.  Patient verified primary health insurance is Medicare and secondary is BCBS.  Patient active with [facility] and have listed DME.  Patient's daughter states patient has 8 hours 7 days week caregivers.  Patient with POA (daughter Kimberly Horner) and Living Will.  Patient not on dialysis or medication coumadin.  Patient with no 30 day admission.  Patient with no financial issues at this time.  Patient family or caregivers will provide transportation upon discharge from  facility.  Patient independent with ADLs but sometimes can require assistance, live alone, daughter or caregivers drives.      Initial Assessment (most recent)       Adult Discharge Assessment - 07/08/23 1628          Discharge Assessment    Assessment Type Discharge Planning Assessment     Confirmed/corrected address, phone number and insurance Yes     Confirmed Demographics Correct on Facesheet     Source of Information patient;family     Communicated LOI with patient/caregiver Date not available/Unable to determine     People in Home alone     Facility Arrived From: home     Do you expect to return to your current living situation? Yes     Do you have help at home or someone to help you manage your care at home? Yes     Who are your caregiver(s) and their phone number(s)? family and caregivers     Prior to hospitilization cognitive status: Alert/Oriented     Current cognitive status: Alert/Oriented     Walking or Climbing Stairs ambulation difficulty, requires equipment;stair climbing difficulty, requires equipment     Dressing/Bathing bathing difficulty, requires equipment;dressing difficulty, requires equipment;bathing difficulty, assistance 1 person     Equipment Currently Used at Home walker, rolling;wheelchair;cane, straight     Readmission within 30 days? No     Patient currently being followed by outpatient case management? No     Do you currently have service(s) that help you manage your care at home? Yes     How Many hours does patient receive services 56     Name and Contact number of agency [facility] and personal sitters     Is the pt/caregiver preference to resume services with current agency Yes     Do you take prescription medications? Yes     Do you have prescription coverage? Yes     Do you have any problems affording any of your prescribed medications? No     Is the patient taking medications as prescribed? yes     Who is going to help you get home at discharge? family or caregivers      How do you get to doctors appointments? family or friend will provide     Are you on dialysis? No     Do you take coumadin? No     Discharge Plan A Home Health     Discharge Plan B Skilled Nursing Facility     DME Needed Upon Discharge  none     Discharge Plan discussed with: Patient;Adult children     Transition of Care Barriers None        Physical Activity    On average, how many days per week do you engage in moderate to strenuous exercise (like a brisk walk)? Patient refused     On average, how many minutes do you engage in exercise at this level? Patient refused        Financial Resource Strain    How hard is it for you to pay for the very basics like food, housing, medical care, and heating? Not hard at all        Housing Stability    In the last 12 months, was there a time when you were not able to pay the mortgage or rent on time? No     In the last 12 months, was there a time when you did not have a steady place to sleep or slept in a shelter (including now)? No        Transportation Needs    In the past 12 months, has lack of transportation kept you from medical appointments or from getting medications? No     In the past 12 months, has lack of transportation kept you from meetings, work, or from getting things needed for daily living? No        Food Insecurity    Within the past 12 months, you worried that your food would run out before you got the money to buy more. Never true     Within the past 12 months, the food you bought just didn't last and you didn't have money to get more. Never true        Stress    Do you feel stress - tense, restless, nervous, or anxious, or unable to sleep at night because your mind is troubled all the time - these days? Not at all        Social Connections    In a typical week, how many times do you talk on the phone with family, friends, or neighbors? More than three times a week     How often do you get together with friends or relatives? More than three times a week      How often do you attend Mosque or Baptist services? More than 4 times per year     How often do you attend meetings of the clubs or organizations you belong to? 1 to 4 times per year     Are you , , , , never , or living with a partner? Patient refused        Alcohol Use    Q1: How often do you have a drink containing alcohol? Patient refused     Q2: How many drinks containing alcohol do you have on a typical day when you are drinking? Patient refused     Q3: How often do you have six or more drinks on one occasion? Patient refused

## 2023-07-09 VITALS
HEIGHT: 62 IN | DIASTOLIC BLOOD PRESSURE: 55 MMHG | SYSTOLIC BLOOD PRESSURE: 115 MMHG | OXYGEN SATURATION: 96 % | HEART RATE: 60 BPM | RESPIRATION RATE: 18 BRPM | BODY MASS INDEX: 24.84 KG/M2 | WEIGHT: 135 LBS | TEMPERATURE: 98 F

## 2023-07-09 LAB
ANION GAP SERPL CALC-SCNC: 15 MMOL/L (ref 8–16)
BASOPHILS # BLD AUTO: 0.03 K/UL (ref 0–0.2)
BASOPHILS NFR BLD: 0.6 % (ref 0–1.9)
BUN SERPL-MCNC: 9 MG/DL (ref 8–23)
CALCIUM SERPL-MCNC: 9.1 MG/DL (ref 8.7–10.5)
CHLORIDE SERPL-SCNC: 106 MMOL/L (ref 95–110)
CO2 SERPL-SCNC: 19 MMOL/L (ref 23–29)
CREAT SERPL-MCNC: 0.8 MG/DL (ref 0.5–1.4)
DIFFERENTIAL METHOD: ABNORMAL
EOSINOPHIL # BLD AUTO: 0.2 K/UL (ref 0–0.5)
EOSINOPHIL NFR BLD: 3.1 % (ref 0–8)
ERYTHROCYTE [DISTWIDTH] IN BLOOD BY AUTOMATED COUNT: 13.6 % (ref 11.5–14.5)
EST. GFR  (NO RACE VARIABLE): >60 ML/MIN/1.73 M^2
GLUCOSE SERPL-MCNC: 89 MG/DL (ref 70–110)
HCT VFR BLD AUTO: 37.6 % (ref 37–48.5)
HGB BLD-MCNC: 12.2 G/DL (ref 12–16)
IMM GRANULOCYTES # BLD AUTO: 0.01 K/UL (ref 0–0.04)
IMM GRANULOCYTES NFR BLD AUTO: 0.2 % (ref 0–0.5)
LYMPHOCYTES # BLD AUTO: 1.2 K/UL (ref 1–4.8)
LYMPHOCYTES NFR BLD: 24.2 % (ref 18–48)
MAGNESIUM SERPL-MCNC: 1.9 MG/DL (ref 1.6–2.6)
MCH RBC QN AUTO: 35.7 PG (ref 27–31)
MCHC RBC AUTO-ENTMCNC: 32.4 G/DL (ref 32–36)
MCV RBC AUTO: 110 FL (ref 82–98)
MONOCYTES # BLD AUTO: 0.5 K/UL (ref 0.3–1)
MONOCYTES NFR BLD: 10.8 % (ref 4–15)
NEUTROPHILS # BLD AUTO: 3 K/UL (ref 1.8–7.7)
NEUTROPHILS NFR BLD: 61.1 % (ref 38–73)
NRBC BLD-RTO: 0 /100 WBC
PHOSPHATE SERPL-MCNC: 3.4 MG/DL (ref 2.7–4.5)
PLATELET # BLD AUTO: 64 K/UL (ref 150–450)
PMV BLD AUTO: 11.3 FL (ref 9.2–12.9)
POTASSIUM SERPL-SCNC: 4.2 MMOL/L (ref 3.5–5.1)
RBC # BLD AUTO: 3.42 M/UL (ref 4–5.4)
SODIUM SERPL-SCNC: 140 MMOL/L (ref 136–145)
WBC # BLD AUTO: 4.91 K/UL (ref 3.9–12.7)

## 2023-07-09 PROCEDURE — 85025 COMPLETE CBC W/AUTO DIFF WBC: CPT | Performed by: STUDENT IN AN ORGANIZED HEALTH CARE EDUCATION/TRAINING PROGRAM

## 2023-07-09 PROCEDURE — 63600175 PHARM REV CODE 636 W HCPCS

## 2023-07-09 PROCEDURE — 36415 COLL VENOUS BLD VENIPUNCTURE: CPT | Performed by: STUDENT IN AN ORGANIZED HEALTH CARE EDUCATION/TRAINING PROGRAM

## 2023-07-09 PROCEDURE — 83735 ASSAY OF MAGNESIUM: CPT | Performed by: STUDENT IN AN ORGANIZED HEALTH CARE EDUCATION/TRAINING PROGRAM

## 2023-07-09 PROCEDURE — 25000003 PHARM REV CODE 250: Performed by: STUDENT IN AN ORGANIZED HEALTH CARE EDUCATION/TRAINING PROGRAM

## 2023-07-09 PROCEDURE — 80048 BASIC METABOLIC PNL TOTAL CA: CPT | Performed by: STUDENT IN AN ORGANIZED HEALTH CARE EDUCATION/TRAINING PROGRAM

## 2023-07-09 PROCEDURE — 94761 N-INVAS EAR/PLS OXIMETRY MLT: CPT

## 2023-07-09 PROCEDURE — 84100 ASSAY OF PHOSPHORUS: CPT | Performed by: STUDENT IN AN ORGANIZED HEALTH CARE EDUCATION/TRAINING PROGRAM

## 2023-07-09 RX ORDER — GABAPENTIN 300 MG/1
300 CAPSULE ORAL 3 TIMES DAILY
Status: DISCONTINUED | OUTPATIENT
Start: 2023-07-09 | End: 2023-07-09 | Stop reason: HOSPADM

## 2023-07-09 RX ADMIN — ACETAMINOPHEN 1000 MG: 10 INJECTION INTRAVENOUS at 05:07

## 2023-07-09 RX ADMIN — GABAPENTIN 300 MG: 300 CAPSULE ORAL at 09:07

## 2023-07-09 NOTE — PROGRESS NOTES
[facility] - [facility]  General Surgery  Progress Note    Subjective:     History of Present Illness:  No notes on file    Post-Op Info:  * No surgery found *         Interval History: Passed GGC.  Having bowel movements.  No nasuea or emesis.  Complaining of back pain.    Medications:  Continuous Infusions:  Scheduled Meds:   acetaminophen  1,000 mg Intravenous Q8H    enoxparin  40 mg Subcutaneous Daily    gabapentin  300 mg Oral TID    LIDOcaine  1 patch Transdermal Q24H     PRN Meds:methocarbamol (ROBAXIN) IVPB, ondansetron, prochlorperazine, sodium chloride 0.9%     Review of patient's allergies indicates:   Allergen Reactions    Codeine Itching and Nausea And Vomiting    Dilaudid [hydromorphone (bulk)] Other (See Comments)     Oversedating, head burning. Pt prefers to avoid.       Percocet [oxycodone-acetaminophen] Itching    Sulfa (sulfonamide antibiotics) Itching and Nausea And Vomiting           Latex, natural rubber Rash     Objective:     Vital Signs (Most Recent):  Temp: 97.5 °F (36.4 °C) (07/09/23 0730)  Pulse: 60 (07/09/23 0730)  Resp: 18 (07/09/23 0730)  BP: (!) 106/53 (07/09/23 0730)  SpO2: 97 % (07/09/23 0730) Vital Signs (24h Range):  Temp:  [97.2 °F (36.2 °C)-98.1 °F (36.7 °C)] 97.5 °F (36.4 °C)  Pulse:  [60-73] 60  Resp:  [14-20] 18  SpO2:  [93 %-97 %] 97 %  BP: ()/(46-59) 106/53     Weight: 61.2 kg (135 lb)  Body mass index is 24.69 kg/m².    Intake/Output - Last 3 Shifts         07/07 0700  07/08 0659 07/08 0700  07/09 0659 07/09 0700  07/10 0659    IV Piggyback 1000      Total Intake(mL/kg) 1000 (16.3)      Net +1000             Urine Occurrence  2 x     Stool Occurrence  2 x              Physical Exam  Constitutional:       General: She is not in acute distress.     Appearance: Normal appearance.   HENT:      Head: Normocephalic and atraumatic.   Eyes:      Extraocular Movements: Extraocular movements intact.   Cardiovascular:      Rate and Rhythm: Normal rate and regular rhythm.    Pulmonary:      Effort: Pulmonary effort is normal. No respiratory distress.   Abdominal:      General: There is no distension.      Palpations: Abdomen is soft.      Tenderness: There is no abdominal tenderness.   Musculoskeletal:         General: Normal range of motion.      Cervical back: Normal range of motion.   Skin:     General: Skin is warm and dry.   Neurological:      General: No focal deficit present.      Mental Status: She is alert and oriented to person, place, and time.        Significant Labs:  I have reviewed all pertinent lab results within the past 24 hours.  CBC:   Recent Labs   Lab 07/09/23  0602   WBC 4.91   RBC 3.42*   HGB 12.2   HCT 37.6   PLT 64*   *   MCH 35.7*   MCHC 32.4     BMP:   Recent Labs   Lab 07/08/23  0317   *      K 4.6      CO2 26   BUN 11   CREATININE 0.8   CALCIUM 9.5   MG 1.5*     CMP:   Recent Labs   Lab 07/07/23  1937 07/08/23  0317   * 139*   CALCIUM 10.4 9.5   ALBUMIN 4.2  --    PROT 8.3  --     142   K 4.4 4.6   CO2 25 26    104   BUN 13 11   CREATININE 1.0 0.8   ALKPHOS 99  --    ALT 52*  --    AST 67*  --    BILITOT 1.0  --        Significant Diagnostics:  I have reviewed all pertinent imaging results/findings within the past 24 hours.    Assessment/Plan:     * Partial small bowel obstruction  Ms. Judd is a 79yo female well known to the general surgery service.  Has a chronic constipation/dysmotility issue of her bowels.  CT scans historically all have appeared the same and no significant findings on surgical intervention.  Follows GI as an outpatient for this issue.    - GGC confirming no bowel obstruction  - Regular diet  - PRN nausea meds  - Gabapentin for sciatica pain  - DVT ppx: lovenox  - tentative plan for d/c tomorrow if tolerating diet        Doc Hyatt MD  General Surgery  [facility]

## 2023-07-09 NOTE — HOSPITAL COURSE
Pt presented with possible bowel obstruction and an NG tube was placed. She was GGC and she passed without issue. She then tolerated a diet and asked to be discharge. She is stable and ready for discharge.

## 2023-07-09 NOTE — SUBJECTIVE & OBJECTIVE
Interval History: Passed GGC.  Having bowel movements.  No nasuea or emesis.  Complaining of back pain.    Medications:  Continuous Infusions:  Scheduled Meds:   acetaminophen  1,000 mg Intravenous Q8H    enoxparin  40 mg Subcutaneous Daily    gabapentin  300 mg Oral TID    LIDOcaine  1 patch Transdermal Q24H     PRN Meds:methocarbamol (ROBAXIN) IVPB, ondansetron, prochlorperazine, sodium chloride 0.9%     Review of patient's allergies indicates:   Allergen Reactions    Codeine Itching and Nausea And Vomiting    Dilaudid [hydromorphone (bulk)] Other (See Comments)     Oversedating, head burning. Pt prefers to avoid.       Percocet [oxycodone-acetaminophen] Itching    Sulfa (sulfonamide antibiotics) Itching and Nausea And Vomiting           Latex, natural rubber Rash     Objective:     Vital Signs (Most Recent):  Temp: 97.5 °F (36.4 °C) (07/09/23 0730)  Pulse: 60 (07/09/23 0730)  Resp: 18 (07/09/23 0730)  BP: (!) 106/53 (07/09/23 0730)  SpO2: 97 % (07/09/23 0730) Vital Signs (24h Range):  Temp:  [97.2 °F (36.2 °C)-98.1 °F (36.7 °C)] 97.5 °F (36.4 °C)  Pulse:  [60-73] 60  Resp:  [14-20] 18  SpO2:  [93 %-97 %] 97 %  BP: ()/(46-59) 106/53     Weight: 61.2 kg (135 lb)  Body mass index is 24.69 kg/m².    Intake/Output - Last 3 Shifts         07/07 0700 07/08 0659 07/08 0700 07/09 0659 07/09 0700  07/10 0659    IV Piggyback 1000      Total Intake(mL/kg) 1000 (16.3)      Net +1000             Urine Occurrence  2 x     Stool Occurrence  2 x              Physical Exam  Constitutional:       General: She is not in acute distress.     Appearance: Normal appearance.   HENT:      Head: Normocephalic and atraumatic.   Eyes:      Extraocular Movements: Extraocular movements intact.   Cardiovascular:      Rate and Rhythm: Normal rate and regular rhythm.   Pulmonary:      Effort: Pulmonary effort is normal. No respiratory distress.   Abdominal:      General: There is no distension.      Palpations: Abdomen is soft.       Tenderness: There is no abdominal tenderness.   Musculoskeletal:         General: Normal range of motion.      Cervical back: Normal range of motion.   Skin:     General: Skin is warm and dry.   Neurological:      General: No focal deficit present.      Mental Status: She is alert and oriented to person, place, and time.        Significant Labs:  I have reviewed all pertinent lab results within the past 24 hours.  CBC:   Recent Labs   Lab 07/09/23  0602   WBC 4.91   RBC 3.42*   HGB 12.2   HCT 37.6   PLT 64*   *   MCH 35.7*   MCHC 32.4     BMP:   Recent Labs   Lab 07/08/23  0317   *      K 4.6      CO2 26   BUN 11   CREATININE 0.8   CALCIUM 9.5   MG 1.5*     CMP:   Recent Labs   Lab 07/07/23  1937 07/08/23  0317   * 139*   CALCIUM 10.4 9.5   ALBUMIN 4.2  --    PROT 8.3  --     142   K 4.4 4.6   CO2 25 26    104   BUN 13 11   CREATININE 1.0 0.8   ALKPHOS 99  --    ALT 52*  --    AST 67*  --    BILITOT 1.0  --        Significant Diagnostics:  I have reviewed all pertinent imaging results/findings within the past 24 hours.

## 2023-07-09 NOTE — PLAN OF CARE
Patient accepted by [facility] via [employer] system.       07/09/23 1223   Post-Acute Status   Post-Acute Authorization Home [facility]   Home Health Status Set-up Complete/Auth obtained

## 2023-07-09 NOTE — PLAN OF CARE
SW sent patient information to [facility] via [employer] system for resumption of care.       07/09/23 1129   Post-Acute Status   Post-Acute Authorization Home [facility]   Home Health Status Referrals Sent   Patient choice form signed by patient/caregiver List with quality metrics by geographic area provided

## 2023-07-09 NOTE — DISCHARGE SUMMARY
[facility]  General Surgery  Discharge Summary      Patient Name: Anayeli Judd  MRN: [id number]  Admission Date: 7/7/2023  Hospital Length of Stay: 2 days  Discharge Date and Time:  07/09/2023 11:14 AM  Attending Physician: Devendra Álvarez MD   Discharging Provider: Maksim Watson MD  Primary Care Provider: Darci Guthrie MD    HPI:    Mr. Judd is an 77yo F w/PMH numerous abdominal surgeries and prior small bowel obstructions as well as colon cancer s/p resection in 2011 most recently admitted to [facility] in April 2023 for similar problem now presents to the ED with constipation, nausea, abdominal pain and CT consistent with partial small bowel obstruction. Patient notes that several days ago she developed fecal incontinence, decreased appetite, abdominal distention with cramping diffuse abdominal pain. She has had several SBOs treated both surgically and medically and states that these are her usual symptoms for early SBOs. She is having liquid Bms which is normal for her, most recently this morning. She last passed gas this morning as well. Does endorse a history of bloody diarrhea, follows with GI for her functional colonic problems. Most recent admission was in April for pSBO which was managed medically. In 2022 she underwent an ex lap with Dr. 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 Otherwise, she has been operated on by Dr. Ward in 2017 and in 2014 by Dr. Silvestre both for SBOs. Noted to have a lot of intraabdominal adhesions in all three cases.     In the ED, workup showed distention of the small bowel with no definite transition point, stomach grossly dilated on imaging. WBC WNL. VSS.     PSH: appendectomy, exploratory laparotomy x3 for SBO (2014, Dr. Silvestre; 2017 Dr. Fletcher; 2022 Dr. Barriga)      * No surgery found *      Indwelling Lines/Drains at time of discharge:   Lines/Drains/Airways     Drain  Duration                NG/OG Tube 07/08/23 0122  Carline fabian Left nostril 1 day              Hospital Course: Pt presented with possible bowel obstruction and an NG tube was placed. She was GGC and she passed without issue. She then tolerated a diet and asked to be discharge. She is stable and ready for discharge.       Goals of Care Treatment Preferences:  Code Status: Full Code    Living Will: Yes              Consults:   Consults (From admission, onward)        Status Ordering Provider     IP consult to case management  Once        Provider:  (Not yet assigned)    Acknowledged KELLY VILLA     Inpatient consult to General Surgery  Once        Provider:  (Not yet assigned)    Completed TARA STOKES          Significant Diagnostic Studies: N/A  See EMR    Pending Diagnostic Studies:     None        Final Active Diagnoses:    Diagnosis Date Noted POA    PRINCIPAL PROBLEM:  Partial small bowel obstruction [K56.600] 09/01/2014 Yes      Problems Resolved During this Admission:      Discharged Condition: stable    Disposition: Home or Self Care    Follow Up:    Patient Instructions:      Diet Adult Regular     No dressing needed     Notify your health care provider if you experience any of the following:  temperature >100.4     Notify your health care provider if you experience any of the following:  persistent nausea and vomiting or diarrhea     Notify your health care provider if you experience any of the following:  redness, tenderness, or signs of infection (pain, swelling, redness, odor or green/yellow discharge around incision site)     Notify your health care provider if you experience any of the following:  difficulty breathing or increased cough     Medications:  Reconciled Home Medications:      Medication List      ASK your doctor about these medications    acetaminophen 650 MG Tbsr  Commonly known as: TYLENOL  Take 1,300 mg by mouth 2 (two) times a day.     ascorbic acid (vitamin C) 1000 MG tablet  Commonly known as: VITAMIN C  Take 1,000 mg by  mouth once daily.     atorvastatin 40 MG tablet  Commonly known as: LIPITOR  Take 40 mg by mouth once daily.     biotin 10,000 mcg Tbdl  Take 1 tablet by mouth once daily.     calcium-vitamin D3 500 mg-5 mcg (200 unit) per tablet  Commonly known as: OS-KASSANDRA 500 + D3  Take 1 tablet by mouth once daily.     cranberry extract 200 mg Cap  Take 1 capsule by mouth once daily.     donepeziL 10 MG tablet  Commonly known as: ARICEPT  Take 1 tablet (10 mg total) by mouth every evening.     DULoxetine 30 MG capsule  Commonly known as: CYMBALTA  TAKE 1 CAPSULE BY MOUTH TWICE  DAILY     ferrous sulfate 324 mg (65 mg iron) Tbec  Take 1 tablet (324 mg total) by mouth once daily.     FREESTYLE EFREN 14 DAY READER Misc  Generic drug: flash glucose scanning reader  Check blood glucose level 3 times daily.     FREESTYLE EFREN 14 DAY SENSOR Kit  Generic drug: flash glucose sensor  1 application by Misc.(Non-Drug; Combo Route) route every 14 (fourteen) days. Disp 30 or 90 day refill     HYDROcodone-acetaminophen 5-325 mg per tablet  Commonly known as: NORCO  Take 1 tablet by mouth every 6 (six) hours as needed for Pain.     hydrocortisone 2.5 % cream  Apply topically 2 (two) times daily as needed.     * levothyroxine 75 MCG tablet  Commonly known as: SYNTHROID  TAKE 1 TABLET(75 MCG) BY MOUTH BEFORE BREAKFAST     * levothyroxine 75 MCG tablet  Commonly known as: SYNTHROID  Take 1 tablet (75 mcg total) by mouth before breakfast.     LIDOcaine 5 %  Commonly known as: LIDODERM  Apply 1 to 3 patches to back daily. Remove & Discard patches within 12 hours.     linaGLIPtin 5 mg Tab tablet  Commonly known as: TRADJENTA  Take 1 tablet (5 mg total) by mouth once daily.     metFORMIN 500 MG tablet  Commonly known as: GLUCOPHAGE  Take 1 tablet (500 mg total) by mouth 2 (two) times daily with meals.     montelukast 10 mg tablet  Commonly known as: SINGULAIR  TAKE 1 TABLET BY MOUTH  DAILY     ondansetron 8 MG Tbdl  Commonly known as: ZOFRAN-ODT  Take  1 tablet (8 mg total) by mouth every 8 (eight) hours as needed (nausea).     pantoprazole 20 MG tablet  Commonly known as: PROTONIX  Take 1 tablet (20 mg total) by mouth 2 (two) times a day.     polyethylene glycol 17 gram Pwpk  Commonly known as: GLYCOLAX  Take 17 g by mouth once daily.     PREMARIN vaginal cream  Generic drug: conjugated estrogens  INSERT 0.5 GRAM VAGINALLY 2 TIMES A WEEK     triamcinolone acetonide 0.1% 0.1 % paste  Commonly known as: KENALOG  APPLY TO THE AFFECTED AREA WITH EVERY-TIP THREE TIMES DAILY     UBRELVY 100 mg tablet  Generic drug: ubrogepant  Take 1 tablet daily as needed for migraine headache. May take 1 additional tablet 2 hours later if needed.     valACYclovir 1000 MG tablet  Commonly known as: VALTREX  Take 1 tablet (1,000 mg total) by mouth once daily.     vitamin D 1000 units Tab  Commonly known as: VITAMIN D3  Take 1,000 Units by mouth once daily. D3         * This list has 2 medication(s) that are the same as other medications prescribed for you. Read the directions carefully, and ask your doctor or other care provider to review them with you.              Time spent on the discharge of patient: 5 minutes    Maksim Watson MD  General Surgery  [facility]

## 2023-07-09 NOTE — NURSING
Patient wants to go home messaged Dr. Hyatt orders received. Patient noted with several BM today; patient denies any pain and has tolerated her breakfast and part of her lunch

## 2023-07-09 NOTE — PLAN OF CARE
Patient cleared for discharge from case management standpoint.       07/09/23 1224   Final Note   Assessment Type Final Discharge Note   Anticipated Discharge Disposition Home-Health   What phone number can be called within the next 1-3 days to see how you are doing after discharge? [phone]   Hospital Resources/Appts/Education Provided Post-Acute resouces added to AVS;Community resources provided;Provided patient/caregiver with written discharge plan information

## 2023-07-09 NOTE — ASSESSMENT & PLAN NOTE
Ms. Judd is a 77yo female well known to the general surgery service.  Has a chronic constipation/dysmotility issue of her bowels.  CT scans historically all have appeared the same and no significant findings on surgical intervention.  Follows GI as an outpatient for this issue.    - GGC confirming no bowel obstruction  - Regular diet  - PRN nausea meds  - Gabapentin for sciatica pain  - DVT ppx: lovenox  - tentative plan for d/c tomorrow if tolerating diet

## 2023-07-10 ENCOUNTER — PATIENT MESSAGE (OUTPATIENT)
Dept: INTERNAL MEDICINE | Facility: CLINIC | Age: 78
End: 2023-07-10
Payer: MEDICARE

## 2023-07-10 RX ORDER — DONEPEZIL HYDROCHLORIDE 10 MG/1
TABLET, FILM COATED ORAL
Qty: 90 TABLET | Refills: 3 | Status: SHIPPED | OUTPATIENT
Start: 2023-07-10

## 2023-07-10 NOTE — PLAN OF CARE
[facility]      HOME HEALTH ORDERS  FACE TO FACE ENCOUNTER    Patient Name: Anayeli Judd  YOB: 1945    PCP: Darci Guthrie MD   PCP Address: [address] / [place] [address]  PCP Phone Number: [phone]  PCP Fax: [phone]    Encounter Date: 7/7/23    Admit to Home Health    Diagnoses:  Active Hospital Problems    Diagnosis  POA    *Partial small bowel obstruction [K56.600]  Yes      Resolved Hospital Problems   No resolved problems to display.       Follow Up Appointments:  No future appointments.    Allergies:  Review of patient's allergies indicates:   Allergen Reactions    Codeine Itching and Nausea And Vomiting    Dilaudid [hydromorphone (bulk)] Other (See Comments)     Oversedating, head burning. Pt prefers to avoid.       Percocet [oxycodone-acetaminophen] Itching    Sulfa (sulfonamide antibiotics) Itching and Nausea And Vomiting           Latex, natural rubber Rash       Medications: Review discharge medications with patient and family and provide education.    No current facility-administered medications for this encounter.     Current Outpatient Medications   Medication Sig Dispense Refill    acetaminophen (TYLENOL) 650 MG TbSR Take 1,300 mg by mouth 2 (two) times a day.      ascorbic acid, vitamin C, (VITAMIN C) 1000 MG tablet Take 1,000 mg by mouth once daily.      atorvastatin (LIPITOR) 40 MG tablet Take 40 mg by mouth once daily.      biotin 10,000 mcg TbDL Take 1 tablet by mouth once daily.       calcium-vitamin D3 (OS-KASSANDRA 500 + D3) 500 mg-5 mcg (200 unit) per tablet Take 1 tablet by mouth once daily.      conjugated estrogens (PREMARIN) vaginal cream INSERT 0.5 GRAM VAGINALLY 2 TIMES A WEEK 30 g 3    cranberry extract 200 mg Cap Take 1 capsule by mouth once daily.      donepeziL (ARICEPT) 10 MG tablet TAKE 1 TABLET(10 MG) BY MOUTH EVERY EVENING 90 tablet 3    DULoxetine (CYMBALTA) 30 MG capsule TAKE 1 CAPSULE BY MOUTH TWICE  DAILY 180 capsule 3    ferrous  sulfate 324 mg (65 mg iron) TbEC Take 1 tablet (324 mg total) by mouth once daily.  0    flash glucose scanning reader (FREESTYLE EFREN 14 DAY READER) Misc Check blood glucose level 3 times daily. 1 each 0    flash glucose sensor (FREESTYLE EFREN 14 DAY SENSOR) Kit 1 application by Misc.(Non-Drug; Combo Route) route every 14 (fourteen) days. Disp 30 or 90 day refill 6 kit 3    HYDROcodone-acetaminophen (NORCO) 5-325 mg per tablet Take 1 tablet by mouth every 6 (six) hours as needed for Pain. 28 tablet 0    hydrocortisone 2.5 % cream Apply topically 2 (two) times daily as needed. 1 each 1    levothyroxine (SYNTHROID) 75 MCG tablet TAKE 1 TABLET(75 MCG) BY MOUTH BEFORE BREAKFAST 90 tablet 3    levothyroxine (SYNTHROID) 75 MCG tablet Take 1 tablet (75 mcg total) by mouth before breakfast. 90 tablet 3    LIDOcaine (LIDODERM) 5 % Apply 1 to 3 patches to back daily. Remove & Discard patches within 12 hours. 90 patch 5    linaGLIPtin (TRADJENTA) 5 mg Tab tablet Take 1 tablet (5 mg total) by mouth once daily. 90 tablet 3    metFORMIN (GLUCOPHAGE) 500 MG tablet Take 1 tablet (500 mg total) by mouth 2 (two) times daily with meals. 180 tablet 3    montelukast (SINGULAIR) 10 mg tablet TAKE 1 TABLET BY MOUTH  DAILY 90 tablet 3    ondansetron (ZOFRAN-ODT) 8 MG TbDL Take 1 tablet (8 mg total) by mouth every 8 (eight) hours as needed (nausea). 30 tablet 0    pantoprazole (PROTONIX) 20 MG tablet Take 1 tablet (20 mg total) by mouth 2 (two) times a day. 180 tablet 1    polyethylene glycol (GLYCOLAX) 17 gram PwPk Take 17 g by mouth once daily. 90 each 3    triamcinolone acetonide 0.1% (KENALOG) 0.1 % paste APPLY TO THE AFFECTED AREA WITH EVERY-TIP THREE TIMES DAILY      ubrogepant (UBRELVY) 100 mg tablet Take 1 tablet daily as needed for migraine headache. May take 1 additional tablet 2 hours later if needed. 16 tablet 2    valACYclovir (VALTREX) 1000 MG tablet Take 1 tablet (1,000 mg total) by mouth once daily. 30 tablet 2    vitamin  D 1000 units Tab Take 1,000 Units by mouth once daily. D3       Discharge Medication List as of 7/9/2023 11:21 AM        CONTINUE these medications which have NOT CHANGED    Details   acetaminophen (TYLENOL) 650 MG TbSR Take 1,300 mg by mouth 2 (two) times a day., Historical Med      ascorbic acid, vitamin C, (VITAMIN C) 1000 MG tablet Take 1,000 mg by mouth once daily., Starting Mon 1/18/2021, Historical Med      atorvastatin (LIPITOR) 40 MG tablet Take 40 mg by mouth once daily., Historical Med      biotin 10,000 mcg TbDL Take 1 tablet by mouth once daily. , Starting Fri 1/1/2021, Historical Med      calcium-vitamin D3 (OS-KASSANDRA 500 + D3) 500 mg-5 mcg (200 unit) per tablet Take 1 tablet by mouth once daily., Historical Med      conjugated estrogens (PREMARIN) vaginal cream INSERT 0.5 GRAM VAGINALLY 2 TIMES A WEEK, Normal      cranberry extract 200 mg Cap Take 1 capsule by mouth once daily., Starting Mon 9/13/2021, Historical Med      DULoxetine (CYMBALTA) 30 MG capsule TAKE 1 CAPSULE BY MOUTH TWICE  DAILY, Normal      ferrous sulfate 324 mg (65 mg iron) TbEC Take 1 tablet (324 mg total) by mouth once daily., Starting Mon 6/13/2022, No Print      flash glucose scanning reader (FREESTYLE EFREN 14 DAY READER) Misc Check blood glucose level 3 times daily., Normal      flash glucose sensor (FREESTYLE EFREN 14 DAY SENSOR) Kit 1 application by Misc.(Non-Drug; Combo Route) route every 14 (fourteen) days. Disp 30 or 90 day refill, Starting Mon 5/8/2023, Normal      HYDROcodone-acetaminophen (NORCO) 5-325 mg per tablet Take 1 tablet by mouth every 6 (six) hours as needed for Pain., Starting Mon 4/17/2023, Normal      hydrocortisone 2.5 % cream Apply topically 2 (two) times daily as needed., Starting Thu 12/9/2021, Normal      !! levothyroxine (SYNTHROID) 75 MCG tablet TAKE 1 TABLET(75 MCG) BY MOUTH BEFORE BREAKFAST, Normal      !! levothyroxine (SYNTHROID) 75 MCG tablet Take 1 tablet (75 mcg total) by mouth before breakfast.,  Starting Mon 2/13/2023, Normal      LIDOcaine (LIDODERM) 5 % Apply 1 to 3 patches to back daily. Remove & Discard patches within 12 hours., Normal      linaGLIPtin (TRADJENTA) 5 mg Tab tablet Take 1 tablet (5 mg total) by mouth once daily., Starting Mon 4/17/2023, Until Wed 5/17/2023, Normal      metFORMIN (GLUCOPHAGE) 500 MG tablet Take 1 tablet (500 mg total) by mouth 2 (two) times daily with meals., Starting Mon 2/27/2023, Normal      montelukast (SINGULAIR) 10 mg tablet TAKE 1 TABLET BY MOUTH  DAILY, Normal      ondansetron (ZOFRAN-ODT) 8 MG TbDL Take 1 tablet (8 mg total) by mouth every 8 (eight) hours as needed (nausea)., Starting Wed 3/10/2021, Normal      pantoprazole (PROTONIX) 20 MG tablet Take 1 tablet (20 mg total) by mouth 2 (two) times a day., Starting Sun 4/23/2023, Normal      polyethylene glycol (GLYCOLAX) 17 gram PwPk Take 17 g by mouth once daily., Starting Sat 1/28/2023, Normal      triamcinolone acetonide 0.1% (KENALOG) 0.1 % paste APPLY TO THE AFFECTED AREA WITH EVERY-TIP THREE TIMES DAILY, Historical Med      ubrogepant (UBRELVY) 100 mg tablet Take 1 tablet daily as needed for migraine headache. May take 1 additional tablet 2 hours later if needed., Normal      valACYclovir (VALTREX) 1000 MG tablet Take 1 tablet (1,000 mg total) by mouth once daily., Starting Mon 4/17/2023, Until Wed 5/17/2023, Normal      vitamin D 1000 units Tab Take 1,000 Units by mouth once daily. D3, Historical Med      donepeziL (ARICEPT) 10 MG tablet Take 1 tablet (10 mg total) by mouth every evening., Starting Wed 7/20/2022, Until Thu 7/20/2023, Normal       !! - Potential duplicate medications found. Please discuss with provider.            I have seen and examined this patient within the last 30 days. My clinical findings that support the need for the home health skilled services and home bound status are the following:no   Weakness/numbness causing balance and gait disturbance due to Weakness/Debility making it  taxing to leave home.     Diet:   diabetic diet 2000 calorie      Referrals/ Consults  Physical Therapy to evaluate and treat. Evaluate for home safety and equipment needs; Establish/upgrade home exercise program. Perform / instruct on therapeutic exercises, gait training, transfer training, and Range of Motion.  Occupational Therapy to evaluate and treat. Evaluate home environment for safety and equipment needs. Perform/Instruct on transfers, ADL training, ROM, and therapeutic exercises.    Activities:   activity as tolerated    Nursing:   Agency to admit patient within 24 hours of hospital discharge unless specified on physician order or at patient request    SN to complete comprehensive assessment including routine vital signs. Instruct on disease process and s/s of complications to report to MD. Review/verify medication list sent home with the patient at time of discharge  and instruct patient/caregiver as needed. Frequency may be adjusted depending on start of care date.     Skilled nurse to perform up to 3 visits PRN for symptoms related to diagnosis    Notify MD if SBP > 160 or < 90; DBP > 90 or < 50; HR > 120 or < 50; Temp > 101; O2 < 88%    Ok to schedule additional visits based on staff availability and patient request on consecutive days within the home health episode.    When multiple disciplines ordered:    Start of Care occurs on Sunday - Wednesday schedule remaining discipline evaluations as ordered on separate consecutive days following the start of care.    Thursday SOC -schedule subsequent evaluations Friday and Monday the following week.     Friday - Saturday SOC - schedule subsequent discipline evaluations on consecutive days starting Monday of the following week.      Miscellaneous   Routine Skin for Bedridden Patients: Instruct patient/caregiver to apply moisture barrier cream to all skin folds and wet areas in perineal area daily and after baths and all bowel movements.    Home Health  Aide:  Nursing Twice weekly, Physical Therapy Twice weekly, and Occupational Therapy Twice weekly     Please resume any home brittanie orders prior to this admit     I certify that this patient is confined to her home and needs intermittent skilled nursing care, physical therapy, and occupational therapy.

## 2023-07-11 ENCOUNTER — PATIENT OUTREACH (OUTPATIENT)
Dept: ADMINISTRATIVE | Facility: CLINIC | Age: 78
End: 2023-07-11
Payer: MEDICARE

## 2023-07-11 RX ORDER — GABAPENTIN 300 MG/1
300 CAPSULE ORAL 3 TIMES DAILY
COMMUNITY
End: 2023-12-19 | Stop reason: SDUPTHER

## 2023-07-11 NOTE — PROGRESS NOTES
C3 nurse spoke with Anayeli Judd  for a [facility] post hospital discharge follow up call. Nurse offered to scheduled [facility] hospital follow-up appointment. The patient declined appointment at this time, stating that her daughter is working on her HOSPFU due to PCP on vacation.

## 2023-07-12 ENCOUNTER — TELEPHONE (OUTPATIENT)
Dept: GASTROENTEROLOGY | Facility: CLINIC | Age: 78
End: 2023-07-12
Payer: MEDICARE

## 2023-07-12 NOTE — TELEPHONE ENCOUNTER
----- Message from Rhona Patricio sent at 7/12/2023 10:45 AM CDT -----  Regarding: appt access  Contact: [phone]  Pt caregiver Claudia calling in regards to scheduling a virtual apptyfor pt, attempted nothing available pls call

## 2023-07-13 ENCOUNTER — TELEPHONE (OUTPATIENT)
Dept: GASTROENTEROLOGY | Facility: CLINIC | Age: 78
End: 2023-07-13
Payer: MEDICARE

## 2023-07-13 NOTE — TELEPHONE ENCOUNTER
Per the patient please have Dr. Jefferson review hospital notes and tests done while there.   CT abdomen pelvis w/contrast Pancreas: No mass or peripancreatic fat stranding.  Pancreatic atrophy.  The pancreatic duct appears more distended proximally, in the pancreatic head, where it measures up to approximately 7 mm in diameter.    Need ED follow up w/Dr. Jefferson or maybe adv gastro.  Informed the pt I will send Dr. Jefferson this message. Pt verbalize understand and thank me  ----- Message from Ken Lockett sent at 7/13/2023 11:05 AM CDT -----  Regarding: return missed call  Contact: @ [phone]  Pt is returning a missed call from someone in the office, pt stated something about Dr. Betts and is asking for a return call back soon. Thanks.         Reason for call: in regards to the blockage she had while in the hospital          Patient requesting call back or [facility] msg: call back

## 2023-07-14 ENCOUNTER — TELEPHONE (OUTPATIENT)
Dept: ENDOSCOPY | Facility: HOSPITAL | Age: 78
End: 2023-07-14

## 2023-07-14 DIAGNOSIS — R93.89 ABNORMAL FINDING ON IMAGING: Primary | ICD-10-CM

## 2023-07-15 NOTE — TELEPHONE ENCOUNTER
----- Message from Jose Hess MD sent at 7/14/2023  5:02 PM CDT -----  Need EUS for dilated PD on CT scan. In 2-3 weeks.  Thanks,  Jose Hess MD  ----- Message -----  From: Lisa Viveros MA  Sent: 7/13/2023   7:59 PM CDT  To: Jose Hess MD    Can you please review and let me know if EUS or clinic and how urgent?

## 2023-07-17 ENCOUNTER — TELEPHONE (OUTPATIENT)
Dept: ENDOSCOPY | Facility: HOSPITAL | Age: 78
End: 2023-07-17
Payer: MEDICARE

## 2023-07-17 DIAGNOSIS — Z12.31 ENCOUNTER FOR SCREENING MAMMOGRAM FOR MALIGNANT NEOPLASM OF BREAST: Primary | ICD-10-CM

## 2023-07-26 ENCOUNTER — DOCUMENT SCAN (OUTPATIENT)
Dept: HOME HEALTH SERVICES | Facility: HOSPITAL | Age: 78
End: 2023-07-26
Payer: MEDICARE

## 2023-08-03 ENCOUNTER — PATIENT MESSAGE (OUTPATIENT)
Dept: INTERNAL MEDICINE | Facility: CLINIC | Age: 78
End: 2023-08-03
Payer: MEDICARE

## 2023-08-03 DIAGNOSIS — G47.00 INSOMNIA, UNSPECIFIED TYPE: Primary | ICD-10-CM

## 2023-08-03 RX ORDER — TRAZODONE HYDROCHLORIDE 50 MG/1
50 TABLET ORAL NIGHTLY
Qty: 90 TABLET | Refills: 1 | Status: SHIPPED | OUTPATIENT
Start: 2023-08-03 | End: 2024-01-30

## 2023-08-07 ENCOUNTER — PATIENT MESSAGE (OUTPATIENT)
Dept: INTERNAL MEDICINE | Facility: CLINIC | Age: 78
End: 2023-08-07
Payer: MEDICARE

## 2023-08-07 DIAGNOSIS — J31.0 RHINITIS, UNSPECIFIED TYPE: Primary | ICD-10-CM

## 2023-08-07 DIAGNOSIS — R04.0 BLEEDING NOSE: ICD-10-CM

## 2023-08-07 RX ORDER — MOMETASONE FUROATE 1 MG/G
OINTMENT TOPICAL 2 TIMES DAILY
Qty: 15 G | Refills: 1 | Status: SHIPPED | OUTPATIENT
Start: 2023-08-07

## 2023-08-12 PROCEDURE — G0179 MD RECERTIFICATION HHA PT: HCPCS | Mod: ,,, | Performed by: INTERNAL MEDICINE

## 2023-08-12 PROCEDURE — G0179 PR HOME HEALTH MD RECERTIFICATION: ICD-10-PCS | Mod: ,,, | Performed by: INTERNAL MEDICINE

## 2023-08-14 ENCOUNTER — PATIENT MESSAGE (OUTPATIENT)
Dept: ADMINISTRATIVE | Facility: HOSPITAL | Age: 78
End: 2023-08-14
Payer: MEDICARE

## 2023-08-17 ENCOUNTER — HOSPITAL ENCOUNTER (OUTPATIENT)
Dept: RADIOLOGY | Facility: HOSPITAL | Age: 78
Discharge: HOME | End: 2023-08-17
Attending: INTERNAL MEDICINE
Payer: MEDICARE

## 2023-08-17 DIAGNOSIS — Z12.31 ENCOUNTER FOR SCREENING MAMMOGRAM FOR MALIGNANT NEOPLASM OF BREAST: ICD-10-CM

## 2023-08-17 PROCEDURE — 77063 BREAST TOMOSYNTHESIS BI: CPT | Mod: 26,,, | Performed by: RADIOLOGY

## 2023-08-17 PROCEDURE — 77067 SCR MAMMO BI INCL CAD: CPT | Mod: 26,,, | Performed by: RADIOLOGY

## 2023-08-17 PROCEDURE — 77067 MAMMO DIGITAL SCREENING BILAT WITH TOMO: ICD-10-PCS | Mod: 26,,, | Performed by: RADIOLOGY

## 2023-08-17 PROCEDURE — 77063 MAMMO DIGITAL SCREENING BILAT WITH TOMO: ICD-10-PCS | Mod: 26,,, | Performed by: RADIOLOGY

## 2023-08-17 PROCEDURE — 77067 SCR MAMMO BI INCL CAD: CPT | Mod: TC

## 2023-08-20 ENCOUNTER — HOSPITAL ENCOUNTER (EMERGENCY)
Facility: HOSPITAL | Age: 78
Discharge: HOME | End: 2023-08-20
Attending: EMERGENCY MEDICINE
Payer: MEDICARE

## 2023-08-20 VITALS
RESPIRATION RATE: 16 BRPM | TEMPERATURE: 98 F | DIASTOLIC BLOOD PRESSURE: 51 MMHG | HEART RATE: 60 BPM | SYSTOLIC BLOOD PRESSURE: 103 MMHG | OXYGEN SATURATION: 97 % | BODY MASS INDEX: 24.69 KG/M2 | WEIGHT: 135 LBS
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
## 2023-08-20 DIAGNOSIS — R79.89 ELEVATED LFTS: ICD-10-CM

## 2023-08-20 DIAGNOSIS — S39.92XA LOWER BACK INJURY, INITIAL ENCOUNTER: ICD-10-CM

## 2023-08-20 DIAGNOSIS — S30.0XXA CONTUSION OF LOWER BACK, INITIAL ENCOUNTER: ICD-10-CM

## 2023-08-20 LAB
ALBUMIN SERPL BCP-MCNC: 3.8 G/DL (ref 3.5–5.2)
ALP SERPL-CCNC: 87 U/L (ref 55–135)
ALT SERPL W/O P-5'-P-CCNC: 72 U/L (ref 10–44)
ANION GAP SERPL CALC-SCNC: 12 MMOL/L (ref 8–16)
APTT PPP: 32.3 SEC (ref 21–32)
AST SERPL-CCNC: 96 U/L (ref 10–40)
BASOPHILS # BLD AUTO: 0.01 K/UL (ref 0–0.2)
BASOPHILS NFR BLD: 0.3 % (ref 0–1.9)
BILIRUB SERPL-MCNC: 1.4 MG/DL (ref 0.1–1)
BUN SERPL-MCNC: 12 MG/DL (ref 8–23)
CALCIUM SERPL-MCNC: 9.8 MG/DL (ref 8.7–10.5)
CHLORIDE SERPL-SCNC: 103 MMOL/L (ref 95–110)
CK SERPL-CCNC: 70 U/L (ref 20–180)
CO2 SERPL-SCNC: 19 MMOL/L (ref 23–29)
CREAT SERPL-MCNC: 1 MG/DL (ref 0.5–1.4)
DIFFERENTIAL METHOD: ABNORMAL
EOSINOPHIL # BLD AUTO: 0 K/UL (ref 0–0.5)
EOSINOPHIL NFR BLD: 1 % (ref 0–8)
ERYTHROCYTE [DISTWIDTH] IN BLOOD BY AUTOMATED COUNT: 13.2 % (ref 11.5–14.5)
EST. GFR  (NO RACE VARIABLE): 57 ML/MIN/1.73 M^2
GLUCOSE SERPL-MCNC: 144 MG/DL (ref 70–110)
HCT VFR BLD AUTO: 37.9 % (ref 37–48.5)
HGB BLD-MCNC: 12.6 G/DL (ref 12–16)
IMM GRANULOCYTES # BLD AUTO: 0.01 K/UL (ref 0–0.04)
IMM GRANULOCYTES NFR BLD AUTO: 0.3 % (ref 0–0.5)
INR PPP: 1 (ref 0.8–1.2)
LACTATE SERPL-SCNC: 1.5 MMOL/L (ref 0.5–2.2)
LYMPHOCYTES # BLD AUTO: 0.5 K/UL (ref 1–4.8)
LYMPHOCYTES NFR BLD: 17.5 % (ref 18–48)
MCH RBC QN AUTO: 35 PG (ref 27–31)
MCHC RBC AUTO-ENTMCNC: 33.2 G/DL (ref 32–36)
MCV RBC AUTO: 105 FL (ref 82–98)
MONOCYTES # BLD AUTO: 0.3 K/UL (ref 0.3–1)
MONOCYTES NFR BLD: 9.2 % (ref 4–15)
NEUTROPHILS # BLD AUTO: 2.2 K/UL (ref 1.8–7.7)
NEUTROPHILS NFR BLD: 71.7 % (ref 38–73)
NRBC BLD-RTO: 0 /100 WBC
PLATELET # BLD AUTO: 65 K/UL (ref 150–450)
PMV BLD AUTO: 11.2 FL (ref 9.2–12.9)
POTASSIUM SERPL-SCNC: 3.9 MMOL/L (ref 3.5–5.1)
PROT SERPL-MCNC: 7.6 G/DL (ref 6–8.4)
PROTHROMBIN TIME: 10.9 SEC (ref 9–12.5)
RBC # BLD AUTO: 3.6 M/UL (ref 4–5.4)
SODIUM SERPL-SCNC: 134 MMOL/L (ref 136–145)
TROPONIN I SERPL DL<=0.01 NG/ML-MCNC: 0.01 NG/ML (ref 0–0.03)
WBC # BLD AUTO: 3.03 K/UL (ref 3.9–12.7)

## 2023-08-20 PROCEDURE — 85610 PROTHROMBIN TIME: CPT

## 2023-08-20 PROCEDURE — 63600175 PHARM REV CODE 636 W HCPCS

## 2023-08-20 PROCEDURE — 25000003 PHARM REV CODE 250

## 2023-08-20 PROCEDURE — 99285 EMERGENCY DEPT VISIT HI MDM: CPT | Mod: 25

## 2023-08-20 PROCEDURE — 80053 COMPREHEN METABOLIC PANEL: CPT

## 2023-08-20 PROCEDURE — 93010 EKG 12-LEAD: ICD-10-PCS | Mod: ,,, | Performed by: INTERNAL MEDICINE

## 2023-08-20 PROCEDURE — 96374 THER/PROPH/DIAG INJ IV PUSH: CPT

## 2023-08-20 PROCEDURE — 85730 THROMBOPLASTIN TIME PARTIAL: CPT

## 2023-08-20 PROCEDURE — 82550 ASSAY OF CK (CPK): CPT

## 2023-08-20 PROCEDURE — 93010 ELECTROCARDIOGRAM REPORT: CPT | Mod: ,,, | Performed by: INTERNAL MEDICINE

## 2023-08-20 PROCEDURE — 84484 ASSAY OF TROPONIN QUANT: CPT

## 2023-08-20 PROCEDURE — 85025 COMPLETE CBC W/AUTO DIFF WBC: CPT

## 2023-08-20 PROCEDURE — 83605 ASSAY OF LACTIC ACID: CPT

## 2023-08-20 PROCEDURE — 93005 ELECTROCARDIOGRAM TRACING: CPT

## 2023-08-20 RX ORDER — ACETAMINOPHEN 500 MG
1000 TABLET ORAL
Status: COMPLETED | OUTPATIENT
Start: 2023-08-20 | End: 2023-08-20

## 2023-08-20 RX ORDER — KETOROLAC TROMETHAMINE 30 MG/ML
10 INJECTION, SOLUTION INTRAMUSCULAR; INTRAVENOUS
Status: COMPLETED | OUTPATIENT
Start: 2023-08-20 | End: 2023-08-20

## 2023-08-20 RX ORDER — NAPROXEN 375 MG/1
375 TABLET ORAL 2 TIMES DAILY WITH MEALS
Qty: 10 TABLET | Refills: 0 | Status: SHIPPED | OUTPATIENT
Start: 2023-08-20 | End: 2023-08-21

## 2023-08-20 RX ADMIN — ACETAMINOPHEN 1000 MG: 500 TABLET ORAL at 09:08

## 2023-08-20 RX ADMIN — KETOROLAC TROMETHAMINE 10 MG: 30 INJECTION, SOLUTION INTRAMUSCULAR; INTRAVENOUS at 01:08

## 2023-08-20 NOTE — ED PROVIDER NOTES
Encounter Date: 2023       History     Chief Complaint   Patient presents with    Fall     Slip and fall this morning, tried to get up and fell backwards again hitting head. On floor for 2 hours. C/o generalized pain starting yesterday. Improving pain to back. GCS 15     78 year old female with PMH as below presents via EMS s/p fall occurring earlier today. Pt had been feeling weak for several days prior to today's fall. Pt has a history of bowel incontinence and got up to go to the bathroom. She had an episode of incontinence in the espitia of her house. She bent over to clean it up when she fell forward. She then tried to get up and fell backwards, hitting the back of her head. She was unable to get up on her own, however, and was on the floor for perhaps an hour before she was seen on video by family, who called EMS. In ED, pt reports lumbar back pain radiating to her groin. She denies loss of consciousness, chest pain, abdominal pain, or shortness of breath.        Review of patient's allergies indicates:   Allergen Reactions    Codeine Itching and Nausea And Vomiting    Dilaudid [hydromorphone (bulk)] Other (See Comments)     Oversedating, head burning. Pt prefers to avoid.       Percocet [oxycodone-acetaminophen] Itching    Sulfa (sulfonamide antibiotics) Itching and Nausea And Vomiting           Latex, natural rubber Rash     Past Medical History:   Diagnosis Date    Abdominal pain     Allergic rhinitis     Arthritis     Blood platelet disorder     Blood platelet disorder     Blood transfusion     during delivery and     Bowel obstruction     Cervical radiculopathy     followed by dr cloud    Colon cancer     transverse colon; resected; Stage IIA (pT3 pN0 MX)    Degenerative joint disease (DJD) of lumbar spine     Diabetes mellitus     Diarrhea     Falls 2020    Family history of breast cancer     Family history of colon cancer     Fatty liver     GERD (gastroesophageal reflux disease)      History of shingles     Hyperlipidemia     Hypomagnesemia     Hypothyroidism     Irritable bowel syndrome     Microscopic colitis     treated     Migraine     Myelodysplastic syndrome     Raynaud phenomenon     Raynaud's disease     Squamous cell carcinoma of skin     Type 2 diabetes mellitus     Usual hyperplasia of lactiferous duct 1970     Past Surgical History:   Procedure Laterality Date    ADENOIDECTOMY      APPENDECTOMY      BACK SURGERY      BREAST BIOPSY  1970    BREAST CYST EXCISION  1970?    BREAST LUMPECTOMY  1970    CARPAL TUNNEL RELEASE      bilateral      SECTION      CHOLECYSTECTOMY  1965    COLECTOMY  2011    Transverse colon resection by Dr. Aguirre    COLONOSCOPY N/A 2017    Procedure: COLONOSCOPY;  Surgeon: Manjit Alvarez MD;  Location: [facility] (4TH FLR);  Service: Endoscopy;  Laterality: N/A;    COLONOSCOPY N/A 2019    Procedure: COLONOSCOPY;  Surgeon: Ramiro Jefferson MD;  Location: [facility] (4TH FLR);  Service: Endoscopy;  Laterality: N/A;  PM prep    COLONOSCOPY N/A 2022    Procedure: COLONOSCOPY;  Surgeon: Ramiro Jefferson MD;  Location: [facility] (2ND FLR);  Service: Endoscopy;  Laterality: N/A;  EGD and colonoscopy in 6-8 weeks from now     states has constipation. -extended Golytely prep    CYSTOSCOPY N/A 2021    Procedure: CYSTOSCOPY;  Surgeon: Viridiana Valenzuela MD;  Location: [facility] 1ST FLR;  Service: Urology;  Laterality: N/A;    ENDOSCOPIC ULTRASOUND OF UPPER GASTROINTESTINAL TRACT N/A 2018    Procedure: ULTRASOUND, UPPER GI TRACT, ENDOSCOPIC;  Surgeon: Jose Hess MD;  Location: [facility];  Service: Endoscopy;  Laterality: N/A;    ENDOSCOPIC ULTRASOUND OF UPPER GASTROINTESTINAL TRACT N/A 2019    Procedure: ULTRASOUND, UPPER GI TRACT, ENDOSCOPIC;  Surgeon: Jose Hess MD;  Location: [facility] (2ND FLR);  Service: Endoscopy;  Laterality: N/A;    ESOPHAGOGASTRODUODENOSCOPY N/A 2018    Procedure: EGD  (ESOPHAGOGASTRODUODENOSCOPY);  Surgeon: Angelo Reynolds MD;  Location: [facility] ENDO (2ND FLR);  Service: Endoscopy;  Laterality: N/A;    ESOPHAGOGASTRODUODENOSCOPY N/A 06/26/2019    Procedure: EGD (ESOPHAGOGASTRODUODENOSCOPY);  Surgeon: Ramiro Jefferson MD;  Location: [facility] ENDO (4TH FLR);  Service: Endoscopy;  Laterality: N/A;    ESOPHAGOGASTRODUODENOSCOPY N/A 05/04/2022    Procedure: EGD (ESOPHAGOGASTRODUODENOSCOPY);  Surgeon: Ramiro Jefferson MD;  Location: [facility] ENDO (2ND FLR);  Service: Endoscopy;  Laterality: N/A;  fully vaccinated-GT    EYE SURGERY      Cataract Removal    FLUOROSCOPIC URODYNAMIC STUDY N/A 11/09/2021    Procedure: URODYNAMIC STUDY, FLUOROSCOPIC;  Surgeon: Viridiana Valenzuela MD;  Location: [facility] OR 1ST FLR;  Service: Urology;  Laterality: N/A;  90 minutes     HYSTERECTOMY      JOINT REPLACEMENT      OOPHORECTOMY  1970    posterolateral fusion with autograft bone and [place] mineralized bone matrix  02/01/2013    at [facility] for lumbar spine stenosis    SMALL INTESTINE SURGERY      SPINE SURGERY      TOE SURGERY      TONSILLECTOMY      TRANSFORAMINAL EPIDURAL INJECTION OF STEROID Bilateral 12/21/2022    Procedure: Injection,steroid,epidural, Todd L5/S1 TF CONTRAST DIRECT REFERRAL;  Surgeon: Toni Osorio MD;  Location: [facility] PAIN MGT;  Service: Pain Management;  Laterality: Bilateral;    TRIGGER FINGER RELEASE       Family History   Problem Relation Age of Onset    Heart disease Father 50        Mi age 50    Colon cancer Father     Bladder Cancer Mother         non smoker    Cataracts Mother     Glaucoma Mother     Heart disease Mother     Hyperlipidemia Mother     Kidney disease Mother     Breast cancer Sister 79    Arthritis Daughter     Asthma Daughter     Depression Daughter     Hypertension Daughter     Stroke Daughter 40    Arthritis Brother     Colon cancer Brother 70    Alcohol abuse Brother     Parkinsonism Brother     Alcohol abuse Brother     Depression Daughter     Stroke Daughter     Alcohol  abuse Brother     Arthritis Brother     Asthma Daughter     Depression Daughter     Celiac disease Neg Hx     Cirrhosis Neg Hx     Colon polyps Neg Hx     Crohn's disease Neg Hx     Cystic fibrosis Neg Hx     Esophageal cancer Neg Hx     Hemochromatosis Neg Hx     Inflammatory bowel disease Neg Hx     Irritable bowel syndrome Neg Hx     Liver cancer Neg Hx     Liver disease Neg Hx     Rectal cancer Neg Hx     Stomach cancer Neg Hx     Ulcerative colitis Neg Hx     Eliazar's disease Neg Hx     Amblyopia Neg Hx     Blindness Neg Hx     Macular degeneration Neg Hx     Retinal detachment Neg Hx     Strabismus Neg Hx     Melanoma Neg Hx      Social History     Tobacco Use    Smoking status: Never    Smokeless tobacco: Never   Substance Use Topics    Alcohol use: Not Currently     Comment: socially, rarely    Drug use: Never     Review of Systems  See HPI    Physical Exam     Initial Vitals [08/20/23 0828]   BP Pulse Resp Temp SpO2   (!) 112/56 68 18 99.5 °F (37.5 °C) 97 %      MAP       --         Physical Exam    Nursing note and vitals reviewed.  Constitutional: She appears well-developed and well-nourished.   HENT:   Head: Normocephalic and atraumatic.   Eyes: EOM are normal. Pupils are equal, round, and reactive to light.   Neck: There are no signs of injury.   Normal range of motion.  Cardiovascular:  Normal rate and regular rhythm.           Pulmonary/Chest: Breath sounds normal.   Abdominal: Abdomen is soft. There is no abdominal tenderness.   Musculoskeletal:      Cervical back: Normal range of motion. No deformity, signs of trauma or bony tenderness.      Lumbar back: Tenderness and bony tenderness present. No deformity.      Comments: Pain with ROM in hips bilaterally     Neurological: She is alert and oriented to person, place, and time. She has normal strength.         ED Course   Procedures  Labs Reviewed   CBC W/ AUTO DIFFERENTIAL - Abnormal; Notable for the following components:       Result Value    WBC  3.03 (*)     RBC 3.60 (*)      (*)     MCH 35.0 (*)     Platelets 65 (*)     Lymph # 0.5 (*)     Lymph % 17.5 (*)     All other components within normal limits   COMPREHENSIVE METABOLIC PANEL - Abnormal; Notable for the following components:    Sodium 134 (*)     CO2 19 (*)     Glucose 144 (*)     Total Bilirubin 1.4 (*)     AST 96 (*)     ALT 72 (*)     eGFR 57.0 (*)     All other components within normal limits   APTT - Abnormal; Notable for the following components:    aPTT 32.3 (*)     All other components within normal limits   PROTIME-INR   LACTIC ACID, PLASMA   TROPONIN I   CK     EKG Readings: (Independently Interpreted)   Initial Reading: No STEMI. Rhythm: Normal Sinus Rhythm.       Imaging Results              CT Lumbar Spine Without Contrast (Final result)  Result time 08/20/23 13:06:22      Final result by Judson Ellison MD (08/20/23 13:06:22)                   Impression:      Advanced chronic degenerative changes throughout the lumbar spine .    No acute fracture deformity or acute traumatic vertebral body malalignment apparent in the visual spine.    Additional findings as detailed above.      Electronically signed by: Judson Ellison  Date:    08/20/2023  Time:    13:06               Narrative:    EXAMINATION:  CT LUMBAR SPINE WITHOUT CONTRAST    CLINICAL HISTORY:  Low back pain, trauma;    Additional history, from the current ED provider note: Slip and fall this morning.  Fell again while trying to get back up.  Back pain.  Generalized pain.    TECHNIQUE:  Low-dose axial, sagittal and coronal reformations are obtained through the lumbar spine.  Contrast was not administered.    COMPARISON:  CT abdomen and pelvis with IV contrast 07/07/2023.    Lumbar spine radiograph 06/06/2023.    MRI lumbar spine without contrast 09/12/2022    CT lumbar spine without contrast 08/21/2012.    FINDINGS:  Diffusely, but slightly heterogeneously, demineralized appearance of the visualized bones, likely related  to osteopenia/osteoporosis.  In the appropriate clinical context, metastatic or metabolic bone disease might also be a consideration.  This demineralization could limit sensitivity of CT for detection of some osseous abnormalities, including some fractures.    There is pre-existing grade 2 L5-S1 spondylolisthesis, thought to be on the basis of the chronic L5 spondylolysis.  These findings are similar to the comparison imaging extending at least as far back as 2012.    Pre-existing grade 1 L4-L5 spondylolisthesis, similar to the 09/12/2022 MRI, and thought to be on the basis of the advanced facet disease.    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  There are bilateral ligamentous ossifications extending from the L5 transverse processes toward either iliac crest, likely related to degenerative change or possibly to old trauma.  Overall, the disc displacement and intraspinal soft tissue detail is suboptimally visualized on these CT images.    The lumbar lordosis is preserved.  There is pre-existing mild lumbar levocurvature, similar to 2012.    Allowing for the above, no acute fracture deformity or acute traumatic vertebral malalignment is apparent in the lumbar spine.    Substantial distension of the partially visualized urinary bladder.  Increased distension of the renal collecting systems and ureters bilaterally is likely secondary to the bladder distension.    Partially visualized surgical changes in the abdomen.  There is some STIR new increased stranding in the intra-abdominal fat, significance uncertain.  The visualized portion of the stomach and most of the visualized portion of colon appear collapsed, limiting CT assessment for abnormal mucosal or mural thickening.  Scattered ASCVD.                                        CT Cervical Spine Without Contrast (Final result)  Result time 08/20/23 12:31:59      Final result by Duglas Alcantara MD (08/20/23 12:31:59)                   Impression:      1. Allowing for motion artifact, no convincing acute displaced fracture or dislocation of the cervical spine noting degenerative changes, surgical changes, and additional findings above.      Electronically signed by: Duglas Alcantara MD  Date:    08/20/2023  Time:    12:31               Narrative:    EXAMINATION:  CT CERVICAL SPINE WITHOUT CONTRAST    CLINICAL HISTORY:  Polytrauma, blunt;    TECHNIQUE:  Low dose axial images, sagittal and coronal reformations were performed though the cervical spine.  Contrast was not administered.    COMPARISON:  Radiograph 06/06/2023    FINDINGS:  There is motion artifact.    The visualized portions of the lung apices are grossly unremarkable.  The parotid glands and remaining visualized salivary glands are grossly unremarkable.  No significant tonsillar enlargement.  No significant cervical lymphadenopathy.  There is carotid vascular calcification.  The posterior paraspinous and hypopharyngeal soft tissues are grossly unremarkable.    Sagittal reformatted imaging demonstrates adequate alignment of the cervical spine noting anterior fusion spans C4 through C6 with disc spacing material at C4-C5 and C5-C6.  There is significant disc space height loss and endplate degenerative change at C6-C7 noting prominent anterior marginal osteophytosis at C6-C7.  The facet joints are aligned noting multilevel facet arthropathy.  No acute displaced fracture or dislocation of the cervical spine.  Motion artifact significantly limits evaluation for spondylitic changes.  Allowing for this, there is multilevel moderate spinal canal stenosis and neuroforaminal narrowing.  The airway is patent.                                       CT Head Without Contrast (Final result)  Result time 08/20/23  12:24:33      Final result by Duglas Alcantara MD (08/20/23 12:24:33)                   Impression:      1. No acute intracranial abnormalities noting sequela of chronic microvascular ischemic change and senescent change.      Electronically signed by: Duglas Alcantara MD  Date:    08/20/2023  Time:    12:24               Narrative:    EXAMINATION:  CT HEAD WITHOUT CONTRAST    CLINICAL HISTORY:  Head trauma, moderate-severe;Trauma;    TECHNIQUE:  Low dose axial images were obtained through the head.  Coronal and sagittal reformations were also performed. Contrast was not administered.    COMPARISON:  MRI 01/21/2023    FINDINGS:  There is generalized cerebral volume loss.  There is hypoattenuation in a periventricular fashion, likely sequela of chronic microvascular ischemic change.  There is no evidence of acute major vascular territory infarct, hemorrhage, or mass.  There is no hydrocephalus.  There are no abnormal extra-axial fluid collections.  The paranasal sinuses and mastoid air cells are clear, and there is no evidence of calvarial fracture.  The visualized soft tissues are unremarkable.                                       X-Ray Pelvis Routine AP (Final result)  Result time 08/20/23 10:08:27      Final result by Judson Ellison MD (08/20/23 10:08:27)                   Impression:      No acute radiographic abnormality in the hips or visualized pelvis.      Electronically signed by: Judson Ellison  Date:    08/20/2023  Time:    10:08               Narrative:    EXAMINATION:  XR PELVIS ROUTINE AP    CLINICAL HISTORY:  r/o bleeding or hemorrhage;    Additional history, from today's ED provider note: Slip and fall this morning, fell again when trying to get up, on the floor for 2 hours.  Back pain and generalized pain.    TECHNIQUE:  AP view of the pelvis was performed.    COMPARISON:  Limited correlation is made with the abdominal radiograph and Gastrografin challenge of  07/08/2023.    FINDINGS:  Lumbosacral spine partially obscured by superimposed viscera.    EKG leads project over the abdomen and upper pelvis, including over the left iliac wing and crest.    Allowing for the above, the hips and visualized osseous pelvic ring demonstrate no acute fracture deformity, dislocation, or traumatic diastasis.    Scattered degenerative changes, including a small pre-existing osteophyte or enthesophyte arising superiorly from the left greater femoral trochanter.                                       X-Ray Chest 1 View (Final result)  Result time 08/20/23 10:03:59      Final result by Judson Ellison MD (08/20/23 10:03:59)                   Impression:      Newly azygous vein, likely related to patient positioning.  Otherwise, no acute radiographic abnormality in the visualized chest.      Electronically signed by: Judson Ellison  Date:    08/20/2023  Time:    10:03               Narrative:    EXAMINATION:  XR CHEST 1 VIEW    CLINICAL HISTORY:  r/o bleeding or hemorrhage;    TECHNIQUE:  Single frontal view of the chest was performed.    COMPARISON:  Right rib series with PA chest x-ray 06/06/2023    FINDINGS:  Partially visualized ACDF hardware.    EKG leads project over the torso.    A circular electronic device again projects over the upper arm soft tissues and likely represents an upper extremity continuous glucose monitor.    Midline thoracic trachea    Increased nodular prominence of the caudal aspect of the right paratracheal stripe, likely related to engorgement of the azygous vein in this non-erect patient.    Radiographically normal cardiopericardial silhouette.    No consolidation, discrete pneumothorax, blunting of either lateral costophrenic angle, or acute osseous abnormality is apparent radiographically.    There remain some hazy reticulonodular opacities in the left lung base, similar to the comparison study.                                    X-Rays:   Independently  Interpreted Readings:   Chest X-Ray: Normal heart size.  No infiltrates.  No acute abnormalities. No pneumothorax or free air   Head CT: No hemorrhage.  No skull fracture.  No acute stroke.     Medications   acetaminophen tablet 1,000 mg (1,000 mg Oral Given 8/20/23 0921)   ketorolac injection 9.999 mg (9.999 mg Intravenous Given 8/20/23 1330)     Medical Decision Making  78 year old female presents via EMS with c/o fall occurring today. Pt bent forward to clean her floor when she fell, then fell again when she tried to get up, striking the back of her head. She reports lumbar back pain. CBC shows no anemia, thrombocytopenia appears to be baseline. X-ray hips shows no acute findings. CT head, C- and L- spine all negative for acute processes. Elevated LFTs potentially incidental, but pt informed and instructed to follow up with PCP. Pt pain improved with ketorolac 15 mg IV, able to ambulate. Pt has a cane at home for ambulation assistance. Discharged with naproxen prescription. Pt and family comfortable with plan. Strict return precautions given.    Amount and/or Complexity of Data Reviewed  Independent Historian: EMS     Details: Brought in for EMS after a ground level fall from standing to a seated position, lying on the ground for 2 hours.  Brought in hemodynamically stable.  Labs: ordered.     Details: See [facility].  Radiology: ordered and independent interpretation performed.     Details: See [facility]  ECG/medicine tests: ordered and independent interpretation performed.     Details: Normal sinus rhythm. No STEMI.    Risk  OTC drugs.  Prescription drug management.  Decision regarding hospitalization.  Risk Details: Patient evaluated, workup including imaging, x-rays negative, had a shared decision regarding hospitalization.  Will manage with anti-inflammatory and short course at home with outpatient follow-up.              Attending Attestation:   Physician Attestation Statement for Resident:  As the supervising MD    Physician Attestation Statement: I have personally seen and examined this patient.   I agree with the above history.  -:   As the supervising MD I agree with the above PE.     As the supervising MD I agree with the above treatment, course, plan, and disposition.                          I have reviewed and concur with the resident's history, physical, assessment, and plan.  I have personally interviewed and examined the patient at bedside.   I did supervise any and all procedures and was present for any critical portion, and was always immediately available for help and as a resource.     The above history physical, review of symptoms, HPI and physical exam reflect my independent interpretation and evaluation.    Complexity: High Risk    Final diagnoses:  [W19.XXXA] Fall (Primary)  [R79.89] Elevated LFTs  [S39.92XA] Lower back injury, initial encounter  [S30.0XXA] Contusion of lower back, initial encounter     Jonathan Cummings DO, FERMÍN  Emergency Staff Physician   Dept of Emergency Medicine   [facility]  Spectralink: [id number]        Disclaimer: This note has been generated using voice-recognition software. There may be typographical errors that have been missed during proof-reading.                    Clinical Impression:   Final diagnoses:  [W19.XXXA] Fall (Primary)  [R79.89] Elevated LFTs  [S39.92XA] Lower back injury, initial encounter  [S30.0XXA] Contusion of lower back, initial encounter        ED Disposition Condition    Discharge Stable          ED Prescriptions       Medication Sig Dispense Start Date End Date Auth. Provider    naproxen (NAPROSYN) 375 MG tablet Take 1 tablet (375 mg total) by mouth 2 (two) times daily with meals. for 5 days 10 tablet 8/20/2023 8/25/2023 Alex Diamond MD          Follow-up Information       Follow up With Specialties Details Why Contact Info    Darci Guthrie MD Internal Medicine In 1 week  [address] [facility] [address]  [phone]                Alex Diamond MD  Resident  08/20/23 1446       Jonathan Cummings, DO  08/20/23 3372

## 2023-08-20 NOTE — ED NOTES
..Patient identifiers for Anayeli Judd 78 y.o. female checked and correct.  Chief Complaint   Patient presents with    Fall     Slip and fall this morning, tried to get up and fell backwards again hitting head. On floor for 2 hours. C/o generalized pain starting yesterday. Improving pain to back. GCS 15     Past Medical History:   Diagnosis Date    Abdominal pain     Allergic rhinitis     Arthritis     Blood platelet disorder     Blood platelet disorder     Blood transfusion     during delivery and     Bowel obstruction     Cervical radiculopathy     followed by dr cloud    Colon cancer     transverse colon; resected; Stage IIA (pT3 pN0 MX)    Degenerative joint disease (DJD) of lumbar spine     Diabetes mellitus     Diarrhea     Falls 2020    Family history of breast cancer     Family history of colon cancer     Fatty liver     GERD (gastroesophageal reflux disease)     History of shingles     Hyperlipidemia     Hypomagnesemia     Hypothyroidism     Irritable bowel syndrome     Microscopic colitis     treated     Migraine     Myelodysplastic syndrome     Raynaud phenomenon     Raynaud's disease     Squamous cell carcinoma of skin     Type 2 diabetes mellitus     Usual hyperplasia of lactiferous duct      Allergies reported:   Review of patient's allergies indicates:   Allergen Reactions    Codeine Itching and Nausea And Vomiting    Dilaudid [hydromorphone (bulk)] Other (See Comments)     Oversedating, head burning. Pt prefers to avoid.       Percocet [oxycodone-acetaminophen] Itching    Sulfa (sulfonamide antibiotics) Itching and Nausea And Vomiting           Latex, natural rubber Rash         LOC: Patient is awake, alert, and aware of environment with an appropriate affect. Patient is oriented x 3 and speaking appropriately.  APPEARANCE: Patient resting comfortably and in no acute distress. Patient is clean and well groomed, patient's clothing is properly fastened.  HEENT: **AAO--hard  of hearing --fell in hallway of home. On the floor for 1 1/2 hrs. Until help arrived. No Loc .   SKIN: The skin is warm and dry. Patient has normal skin turgor and moist mucus membranes. Skin is intact; no bruising or breakdown noted.  MUSKULOSKELETAL: Patient is moving all extremities well, no obvious deformities noted. Pulses intact.   RESPIRATORY: Airway is open and patent. Respirations are spontaneous and non-labored with normal effort and rate, BBS=clear  CARDIAC: Patient has a normal rate and rhythm. NSR  on cardiac monitor,No peripheral edema noted.   ABDOMEN: No distention noted. Bowel sounds active in all 4 quadrants. Soft and non-tender upon palpation.  NEUROLOGICAL: Facial expression is symmetrical. Hand grasps are equal bilaterally. Normal sensation in all extremities when touched with finger.

## 2023-08-20 NOTE — DISCHARGE INSTRUCTIONS
Return to the Emergency Department if you experience any of the following:  - new or worsening headache despite treatment  - blurry vision, loss of vision, or partial loss of vision  - loss of consciousness  - inability to walk  - worsening back pain despite treatment  - ANY new or worsening symptoms    Follow up with your PCP in one week for:  - Fall  - Elevated liver function tests    Medication:  - continue taking your prescribed medications  - take naproxen, 375 mg, twice a day for pain. Do not take any ibuprofen while taking naproxen. You may continue taking tylenol and gabapentin

## 2023-08-21 ENCOUNTER — PATIENT MESSAGE (OUTPATIENT)
Dept: INTERNAL MEDICINE | Facility: CLINIC | Age: 78
End: 2023-08-21
Payer: MEDICARE

## 2023-08-21 DIAGNOSIS — M54.50 LUMBAR PAIN: Primary | ICD-10-CM

## 2023-08-21 RX ORDER — NAPROXEN 375 MG/1
375 TABLET ORAL 2 TIMES DAILY WITH MEALS
Qty: 20 TABLET | Refills: 0 | Status: SHIPPED | OUTPATIENT
Start: 2023-08-21 | End: 2023-08-31

## 2023-08-21 NOTE — TELEPHONE ENCOUNTER
Spoke with daughter. Pain mostly in back and hips. Images reviewed from ED, relatively stable. Start short course Naproxen as prescribed by ED physician. If persistent hip pains, may consider CT/MRI for further eval.

## 2023-08-28 ENCOUNTER — PATIENT MESSAGE (OUTPATIENT)
Dept: INTERNAL MEDICINE | Facility: CLINIC | Age: 78
End: 2023-08-28
Payer: MEDICARE

## 2023-09-09 DIAGNOSIS — M54.9 BACK PAIN, UNSPECIFIED BACK LOCATION, UNSPECIFIED BACK PAIN LATERALITY, UNSPECIFIED CHRONICITY: ICD-10-CM

## 2023-09-09 NOTE — TELEPHONE ENCOUNTER
Refill Routing Note   Medication(s) are not appropriate for processing by [facility] for the following reason(s):      Medication outside of protocol    [facility] action(s):  Route Care Due:  None identified            Appointments  past 12m or future 3m with PCP    Date Provider   Last Visit   6/6/2023 Darci Guthrie MD   Next Visit   Visit date not found Darci Guthrie MD   ED visits in past 90 days: 1        Note composed:6:19 PM 09/09/2023

## 2023-09-11 ENCOUNTER — EXTERNAL HOME HEALTH (OUTPATIENT)
Dept: HOME HEALTH SERVICES | Facility: HOSPITAL | Age: 78
End: 2023-09-11
Payer: MEDICARE

## 2023-09-11 RX ORDER — LIDOCAINE 50 MG/G
PATCH TOPICAL
Qty: 30 PATCH | Refills: 2 | Status: SHIPPED | OUTPATIENT
Start: 2023-09-11

## 2023-09-12 ENCOUNTER — TELEPHONE (OUTPATIENT)
Dept: AUDIOLOGY | Facility: CLINIC | Age: 78
End: 2023-09-12
Payer: MEDICARE

## 2023-09-27 ENCOUNTER — HOSPITAL ENCOUNTER (INPATIENT)
Facility: HOSPITAL | Age: 78
LOS: 2 days | Discharge: HOME HEALTH | DRG: 390 | End: 2023-09-30
Attending: EMERGENCY MEDICINE | Admitting: STUDENT IN AN ORGANIZED HEALTH CARE EDUCATION/TRAINING PROGRAM
Payer: MEDICARE

## 2023-09-27 DIAGNOSIS — K56.50 SMALL BOWEL OBSTRUCTION DUE TO ADHESIONS: Primary | ICD-10-CM

## 2023-09-27 DIAGNOSIS — E11.9 TYPE 2 DIABETES MELLITUS WITHOUT COMPLICATION, WITHOUT LONG-TERM CURRENT USE OF INSULIN: Chronic | ICD-10-CM

## 2023-09-27 DIAGNOSIS — Z01.89 ENCOUNTER FOR IMAGING STUDY TO CONFIRM NASOGASTRIC (NG) TUBE PLACEMENT: ICD-10-CM

## 2023-09-27 DIAGNOSIS — E03.9 ACQUIRED HYPOTHYROIDISM: Chronic | ICD-10-CM

## 2023-09-27 DIAGNOSIS — R10.9 ABDOMINAL PAIN: ICD-10-CM

## 2023-09-27 DIAGNOSIS — K56.609 SBO (SMALL BOWEL OBSTRUCTION): ICD-10-CM

## 2023-09-27 LAB
ALBUMIN SERPL BCP-MCNC: 4.3 G/DL (ref 3.5–5.2)
ALP SERPL-CCNC: 95 U/L (ref 55–135)
ALT SERPL W/O P-5'-P-CCNC: 40 U/L (ref 10–44)
ANION GAP SERPL CALC-SCNC: 11 MMOL/L (ref 8–16)
AST SERPL-CCNC: 42 U/L (ref 10–40)
BASOPHILS # BLD AUTO: 0.04 K/UL (ref 0–0.2)
BASOPHILS NFR BLD: 0.4 % (ref 0–1.9)
BILIRUB SERPL-MCNC: 1.2 MG/DL (ref 0.1–1)
BUN SERPL-MCNC: 11 MG/DL (ref 8–23)
CALCIUM SERPL-MCNC: 10.5 MG/DL (ref 8.7–10.5)
CHLORIDE SERPL-SCNC: 101 MMOL/L (ref 95–110)
CO2 SERPL-SCNC: 22 MMOL/L (ref 23–29)
CREAT SERPL-MCNC: 0.9 MG/DL (ref 0.5–1.4)
DIFFERENTIAL METHOD: ABNORMAL
EOSINOPHIL # BLD AUTO: 0.2 K/UL (ref 0–0.5)
EOSINOPHIL NFR BLD: 1.5 % (ref 0–8)
ERYTHROCYTE [DISTWIDTH] IN BLOOD BY AUTOMATED COUNT: 14.4 % (ref 11.5–14.5)
EST. GFR  (NO RACE VARIABLE): >60 ML/MIN/1.73 M^2
GLUCOSE SERPL-MCNC: 190 MG/DL (ref 70–110)
HCT VFR BLD AUTO: 38.1 % (ref 37–48.5)
HGB BLD-MCNC: 12.9 G/DL (ref 12–16)
IMM GRANULOCYTES # BLD AUTO: 0.03 K/UL (ref 0–0.04)
IMM GRANULOCYTES NFR BLD AUTO: 0.3 % (ref 0–0.5)
LIPASE SERPL-CCNC: 57 U/L (ref 4–60)
LYMPHOCYTES # BLD AUTO: 1.4 K/UL (ref 1–4.8)
LYMPHOCYTES NFR BLD: 14 % (ref 18–48)
MCH RBC QN AUTO: 35.4 PG (ref 27–31)
MCHC RBC AUTO-ENTMCNC: 33.9 G/DL (ref 32–36)
MCV RBC AUTO: 105 FL (ref 82–98)
MONOCYTES # BLD AUTO: 0.7 K/UL (ref 0.3–1)
MONOCYTES NFR BLD: 6.8 % (ref 4–15)
NEUTROPHILS # BLD AUTO: 7.7 K/UL (ref 1.8–7.7)
NEUTROPHILS NFR BLD: 77 % (ref 38–73)
NRBC BLD-RTO: 0 /100 WBC
PLATELET # BLD AUTO: 96 K/UL (ref 150–450)
PMV BLD AUTO: 11.2 FL (ref 9.2–12.9)
POTASSIUM SERPL-SCNC: 4.4 MMOL/L (ref 3.5–5.1)
PROT SERPL-MCNC: 8.6 G/DL (ref 6–8.4)
RBC # BLD AUTO: 3.64 M/UL (ref 4–5.4)
SODIUM SERPL-SCNC: 134 MMOL/L (ref 136–145)
WBC # BLD AUTO: 9.94 K/UL (ref 3.9–12.7)

## 2023-09-27 PROCEDURE — 85025 COMPLETE CBC W/AUTO DIFF WBC: CPT | Performed by: PHYSICIAN ASSISTANT

## 2023-09-27 PROCEDURE — 96361 HYDRATE IV INFUSION ADD-ON: CPT

## 2023-09-27 PROCEDURE — 93010 EKG 12-LEAD: ICD-10-PCS | Mod: ,,, | Performed by: INTERNAL MEDICINE

## 2023-09-27 PROCEDURE — 82962 GLUCOSE BLOOD TEST: CPT

## 2023-09-27 PROCEDURE — 63600175 PHARM REV CODE 636 W HCPCS: Performed by: EMERGENCY MEDICINE

## 2023-09-27 PROCEDURE — 93010 ELECTROCARDIOGRAM REPORT: CPT | Mod: ,,, | Performed by: INTERNAL MEDICINE

## 2023-09-27 PROCEDURE — 96374 THER/PROPH/DIAG INJ IV PUSH: CPT

## 2023-09-27 PROCEDURE — 83036 HEMOGLOBIN GLYCOSYLATED A1C: CPT | Performed by: PHYSICIAN ASSISTANT

## 2023-09-27 PROCEDURE — 63600175 PHARM REV CODE 636 W HCPCS: Performed by: PHYSICIAN ASSISTANT

## 2023-09-27 PROCEDURE — 80053 COMPREHEN METABOLIC PANEL: CPT | Performed by: PHYSICIAN ASSISTANT

## 2023-09-27 PROCEDURE — 96376 TX/PRO/DX INJ SAME DRUG ADON: CPT

## 2023-09-27 PROCEDURE — 83690 ASSAY OF LIPASE: CPT | Performed by: PHYSICIAN ASSISTANT

## 2023-09-27 PROCEDURE — 99285 EMERGENCY DEPT VISIT HI MDM: CPT | Mod: 25

## 2023-09-27 PROCEDURE — 93005 ELECTROCARDIOGRAM TRACING: CPT

## 2023-09-27 RX ORDER — MORPHINE SULFATE 4 MG/ML
4 INJECTION, SOLUTION INTRAMUSCULAR; INTRAVENOUS
Status: COMPLETED | OUTPATIENT
Start: 2023-09-27 | End: 2023-09-27

## 2023-09-27 RX ADMIN — IOHEXOL 75 ML: 350 INJECTION, SOLUTION INTRAVENOUS at 11:09

## 2023-09-27 RX ADMIN — MORPHINE SULFATE 4 MG: 4 INJECTION INTRAVENOUS at 11:09

## 2023-09-27 RX ADMIN — SODIUM CHLORIDE, POTASSIUM CHLORIDE, SODIUM LACTATE AND CALCIUM CHLORIDE 1000 ML: 600; 310; 30; 20 INJECTION, SOLUTION INTRAVENOUS at 09:09

## 2023-09-27 RX ADMIN — MORPHINE SULFATE 4 MG: 4 INJECTION INTRAVENOUS at 09:09

## 2023-09-27 NOTE — Clinical Note
Diagnosis: Abdominal pain [028224]   Future Attending Provider: RAFAELA CHRISTY [[id number]]   Admitting Provider:: RAFAELA CHRISTY [[id number]]   Bed request comments: 10th floor

## 2023-09-28 DIAGNOSIS — K21.9 GASTROESOPHAGEAL REFLUX DISEASE, UNSPECIFIED WHETHER ESOPHAGITIS PRESENT: Chronic | ICD-10-CM

## 2023-09-28 LAB
BILIRUB UR QL STRIP: NEGATIVE
CLARITY UR REFRACT.AUTO: CLEAR
COLOR UR AUTO: YELLOW
ESTIMATED AVG GLUCOSE: 143 MG/DL (ref 68–131)
GLUCOSE UR QL STRIP: NEGATIVE
HBA1C MFR BLD: 6.6 % (ref 4–5.6)
HGB UR QL STRIP: NEGATIVE
KETONES UR QL STRIP: NEGATIVE
LEUKOCYTE ESTERASE UR QL STRIP: NEGATIVE
MICROSCOPIC COMMENT: NORMAL
NITRITE UR QL STRIP: NEGATIVE
PH UR STRIP: 6 [PH] (ref 5–8)
POCT GLUCOSE: 129 MG/DL (ref 70–110)
POCT GLUCOSE: 130 MG/DL (ref 70–110)
POCT GLUCOSE: 195 MG/DL (ref 70–110)
PROT UR QL STRIP: NEGATIVE
RBC #/AREA URNS AUTO: 2 /HPF (ref 0–4)
SP GR UR STRIP: 1.01 (ref 1–1.03)
SQUAMOUS #/AREA URNS AUTO: 0 /HPF
URN SPEC COLLECT METH UR: NORMAL
WBC #/AREA URNS AUTO: 1 /HPF (ref 0–5)

## 2023-09-28 PROCEDURE — 99223 1ST HOSP IP/OBS HIGH 75: CPT | Mod: AI,,, | Performed by: STUDENT IN AN ORGANIZED HEALTH CARE EDUCATION/TRAINING PROGRAM

## 2023-09-28 PROCEDURE — 96376 TX/PRO/DX INJ SAME DRUG ADON: CPT

## 2023-09-28 PROCEDURE — 63600175 PHARM REV CODE 636 W HCPCS: Performed by: STUDENT IN AN ORGANIZED HEALTH CARE EDUCATION/TRAINING PROGRAM

## 2023-09-28 PROCEDURE — 25500020 PHARM REV CODE 255: Performed by: EMERGENCY MEDICINE

## 2023-09-28 PROCEDURE — 81001 URINALYSIS AUTO W/SCOPE: CPT | Performed by: PHYSICIAN ASSISTANT

## 2023-09-28 PROCEDURE — 99223 PR INITIAL HOSPITAL CARE,LEVL III: ICD-10-PCS | Mod: AI,,, | Performed by: STUDENT IN AN ORGANIZED HEALTH CARE EDUCATION/TRAINING PROGRAM

## 2023-09-28 PROCEDURE — 20600001 HC STEP DOWN PRIVATE ROOM

## 2023-09-28 PROCEDURE — 63600175 PHARM REV CODE 636 W HCPCS: Performed by: EMERGENCY MEDICINE

## 2023-09-28 PROCEDURE — 25000003 PHARM REV CODE 250: Performed by: STUDENT IN AN ORGANIZED HEALTH CARE EDUCATION/TRAINING PROGRAM

## 2023-09-28 RX ORDER — ONDANSETRON 2 MG/ML
4 INJECTION INTRAMUSCULAR; INTRAVENOUS EVERY 6 HOURS PRN
Status: DISCONTINUED | OUTPATIENT
Start: 2023-09-28 | End: 2023-10-01 | Stop reason: HOSPADM

## 2023-09-28 RX ORDER — LIDOCAINE HYDROCHLORIDE 10 MG/ML
1 INJECTION, SOLUTION EPIDURAL; INFILTRATION; INTRACAUDAL; PERINEURAL ONCE AS NEEDED
Status: DISCONTINUED | OUTPATIENT
Start: 2023-09-28 | End: 2023-10-01 | Stop reason: HOSPADM

## 2023-09-28 RX ORDER — MORPHINE SULFATE 4 MG/ML
4 INJECTION, SOLUTION INTRAMUSCULAR; INTRAVENOUS
Status: COMPLETED | OUTPATIENT
Start: 2023-09-28 | End: 2023-09-28

## 2023-09-28 RX ORDER — SODIUM CHLORIDE, SODIUM LACTATE, POTASSIUM CHLORIDE, CALCIUM CHLORIDE 600; 310; 30; 20 MG/100ML; MG/100ML; MG/100ML; MG/100ML
INJECTION, SOLUTION INTRAVENOUS CONTINUOUS
Status: DISCONTINUED | OUTPATIENT
Start: 2023-09-28 | End: 2023-09-29

## 2023-09-28 RX ORDER — SODIUM CHLORIDE 0.9 % (FLUSH) 0.9 %
10 SYRINGE (ML) INJECTION
Status: DISCONTINUED | OUTPATIENT
Start: 2023-09-28 | End: 2023-10-01 | Stop reason: HOSPADM

## 2023-09-28 RX ORDER — INSULIN ASPART 100 [IU]/ML
0-5 INJECTION, SOLUTION INTRAVENOUS; SUBCUTANEOUS EVERY 6 HOURS PRN
Status: DISCONTINUED | OUTPATIENT
Start: 2023-09-28 | End: 2023-10-01 | Stop reason: HOSPADM

## 2023-09-28 RX ORDER — LEVOTHYROXINE SODIUM 20 UG/ML
25 INJECTION, SOLUTION INTRAVENOUS DAILY
Status: DISCONTINUED | OUTPATIENT
Start: 2023-09-28 | End: 2023-09-29

## 2023-09-28 RX ORDER — ENOXAPARIN SODIUM 100 MG/ML
40 INJECTION SUBCUTANEOUS EVERY 24 HOURS
Status: DISCONTINUED | OUTPATIENT
Start: 2023-09-28 | End: 2023-10-01 | Stop reason: HOSPADM

## 2023-09-28 RX ORDER — GLUCAGON 1 MG
1 KIT INJECTION
Status: DISCONTINUED | OUTPATIENT
Start: 2023-09-28 | End: 2023-10-01 | Stop reason: HOSPADM

## 2023-09-28 RX ORDER — MORPHINE SULFATE 2 MG/ML
2 INJECTION, SOLUTION INTRAMUSCULAR; INTRAVENOUS EVERY 4 HOURS PRN
Status: DISCONTINUED | OUTPATIENT
Start: 2023-09-28 | End: 2023-09-29

## 2023-09-28 RX ADMIN — MORPHINE SULFATE 2 MG: 2 INJECTION, SOLUTION INTRAMUSCULAR; INTRAVENOUS at 10:09

## 2023-09-28 RX ADMIN — MORPHINE SULFATE 4 MG: 4 INJECTION INTRAVENOUS at 05:09

## 2023-09-28 RX ADMIN — SODIUM CHLORIDE, POTASSIUM CHLORIDE, SODIUM LACTATE AND CALCIUM CHLORIDE: 600; 310; 30; 20 INJECTION, SOLUTION INTRAVENOUS at 05:09

## 2023-09-28 RX ADMIN — ENOXAPARIN SODIUM 40 MG: 40 INJECTION SUBCUTANEOUS at 05:09

## 2023-09-28 RX ADMIN — SODIUM CHLORIDE, POTASSIUM CHLORIDE, SODIUM LACTATE AND CALCIUM CHLORIDE: 600; 310; 30; 20 INJECTION, SOLUTION INTRAVENOUS at 07:09

## 2023-09-28 RX ADMIN — LEVOTHYROXINE SODIUM 25 MCG: 20 INJECTION, SOLUTION INTRAVENOUS at 11:09

## 2023-09-28 NOTE — CONSULTS
[facility] Dept  General Surgery  Consult Note    Patient Name: Anayeli Judd  MRN: [id number]  Code Status: Full Code  Admission Date: 9/27/2023  Hospital Length of Stay: 0 days  Attending Physician: Steve Vera MD  Primary Care Provider: Darci Guthrie MD    Patient information was obtained from patient, past medical records and ER records.     Inpatient consult to General surgery  Consult performed by: Mickey Mckee MD  Consult ordered by: Yarely Castro MD        Subjective:     Principal Problem: SBO    History of Present Illness: Anayeli Judd is a 78 y.o. female with PMHx of DM II, GERD, DVT, multiple prior SBOs s/p ex lap in 2014, 2017, 2022 who presents with abdominal pain. States the pain is diffuse, onset about one day ago. She also c/o nausea, though this has improved with meds in the ED. She last passed BM and flatus yesterday. This symptoms are consistent with her prior admissions for SBO. She otherwise denies other recent changes in her health. She was last admitted for SBO in July 2023, was managed conservatively with [facility] and discharged afterward.   She is hemodynamically stable. Labs unremarkable overall, WBC and lactate within normal limits. CT suggestive of SBO, possible colitis, small hiatal hernia.       No current facility-administered medications on file prior to encounter.     Current Outpatient Medications on File Prior to Encounter   Medication Sig    acetaminophen (TYLENOL) 650 MG TbSR Take 1,300 mg by mouth 2 (two) times a day.    ascorbic acid, vitamin C, (VITAMIN C) 1000 MG tablet Take 1,000 mg by mouth once daily.    atorvastatin (LIPITOR) 40 MG tablet Take 40 mg by mouth once daily.    biotin 10,000 mcg TbDL Take 1 tablet by mouth once daily.     calcium-vitamin D3 (OS-KASSANDRA 500 + D3) 500 mg-5 mcg (200 unit) per tablet Take 1 tablet by mouth once daily.    conjugated estrogens (PREMARIN) vaginal cream INSERT 0.5 GRAM VAGINALLY 2 TIMES A WEEK     cranberry extract 200 mg Cap Take 1 capsule by mouth once daily.    donepeziL (ARICEPT) 10 MG tablet TAKE 1 TABLET(10 MG) BY MOUTH EVERY EVENING    DULoxetine (CYMBALTA) 30 MG capsule TAKE 1 CAPSULE BY MOUTH TWICE  DAILY    ferrous sulfate 324 mg (65 mg iron) TbEC Take 1 tablet (324 mg total) by mouth once daily.    flash glucose scanning reader (FREESTYLE EFREN 14 DAY READER) Misc Check blood glucose level 3 times daily.    flash glucose sensor (FREESTYLE EFREN 14 DAY SENSOR) Kit 1 application by Misc.(Non-Drug; Combo Route) route every 14 (fourteen) days. Disp 30 or 90 day refill    gabapentin (NEURONTIN) 300 MG capsule Take 300 mg by mouth 3 (three) times daily.    HYDROcodone-acetaminophen (NORCO) 5-325 mg per tablet Take 1 tablet by mouth every 6 (six) hours as needed for Pain.    hydrocortisone 2.5 % cream Apply topically 2 (two) times daily as needed. (Patient not taking: Reported on 7/11/2023)    levothyroxine (SYNTHROID) 75 MCG tablet TAKE 1 TABLET(75 MCG) BY MOUTH BEFORE BREAKFAST    levothyroxine (SYNTHROID) 75 MCG tablet Take 1 tablet (75 mcg total) by mouth before breakfast. (Patient not taking: Reported on 7/11/2023)    LIDOcaine (LIDODERM) 5 % APPLY 1 TO 3 PATCHES TO BACK DAILY. REMOVE WITHIN 12 HOURS    linaGLIPtin (TRADJENTA) 5 mg Tab tablet Take 1 tablet (5 mg total) by mouth once daily.    metFORMIN (GLUCOPHAGE) 500 MG tablet Take 1 tablet (500 mg total) by mouth 2 (two) times daily with meals.    mometasone (ELOCON) 0.1 % ointment Apply topically 2 (two) times a day. Mix 50/50 with mupirocin ointment. Apply in each nostril twice daily.    montelukast (SINGULAIR) 10 mg tablet TAKE 1 TABLET BY MOUTH  DAILY    ondansetron (ZOFRAN-ODT) 8 MG TbDL Take 1 tablet (8 mg total) by mouth every 8 (eight) hours as needed (nausea). (Patient not taking: Reported on 7/11/2023)    pantoprazole (PROTONIX) 20 MG tablet Take 1 tablet (20 mg total) by mouth 2 (two) times a day.    polyethylene  glycol (GLYCOLAX) 17 gram PwPk Take 17 g by mouth once daily.    traZODone (DESYREL) 50 MG tablet Take 1 tablet (50 mg total) by mouth every evening.    triamcinolone acetonide 0.1% (KENALOG) 0.1 % paste APPLY TO THE AFFECTED AREA WITH EVERY-TIP THREE TIMES DAILY    ubrogepant (UBRELVY) 100 mg tablet Take 1 tablet daily as needed for migraine headache. May take 1 additional tablet 2 hours later if needed. (Patient not taking: Reported on 2023)    valACYclovir (VALTREX) 1000 MG tablet Take 1 tablet (1,000 mg total) by mouth once daily.    vitamin D 1000 units Tab Take 1,000 Units by mouth once daily. D3    [DISCONTINUED] hyoscyamine (ANASPAZ,LEVSIN) 0.125 mg Tab TAKE 1 TABLET(125 MCG) BY MOUTH EVERY 8 HOURS AS NEEDED FOR URGENCY       Review of patient's allergies indicates:   Allergen Reactions    Codeine Itching and Nausea And Vomiting    Dilaudid [hydromorphone (bulk)] Other (See Comments)     Oversedating, head burning. Pt prefers to avoid.       Percocet [oxycodone-acetaminophen] Itching    Sulfa (sulfonamide antibiotics) Itching and Nausea And Vomiting           Latex, natural rubber Rash       Past Medical History:   Diagnosis Date    Abdominal pain     Allergic rhinitis     Arthritis     Blood platelet disorder     Blood platelet disorder     Blood transfusion     during delivery and     Bowel obstruction     Cervical radiculopathy     followed by dr cloud    Colon cancer     transverse colon; resected; Stage IIA (pT3 pN0 MX)    Degenerative joint disease (DJD) of lumbar spine     Diabetes mellitus     Diarrhea     Falls 2020    Family history of breast cancer     Family history of colon cancer     Fatty liver     GERD (gastroesophageal reflux disease)     History of shingles     Hyperlipidemia     Hypomagnesemia     Hypothyroidism     Irritable bowel syndrome     Microscopic colitis     treated     Migraine     Myelodysplastic syndrome      Raynaud phenomenon     Raynaud's disease     Squamous cell carcinoma of skin     Type 2 diabetes mellitus     Usual hyperplasia of lactiferous duct 1970     Past Surgical History:   Procedure Laterality Date    ADENOIDECTOMY      APPENDECTOMY      BACK SURGERY      BREAST BIOPSY  1970    BREAST CYST EXCISION  1970?    BREAST LUMPECTOMY  1970    CARPAL TUNNEL RELEASE      bilateral      SECTION      CHOLECYSTECTOMY  1965    COLECTOMY  2011    Transverse colon resection by Dr. Aguirre    COLONOSCOPY N/A 2017    Procedure: COLONOSCOPY;  Surgeon: Manjit Alvarez MD;  Location: [facility] (4TH FLR);  Service: Endoscopy;  Laterality: N/A;    COLONOSCOPY N/A 2019    Procedure: COLONOSCOPY;  Surgeon: Ramiro Jefferson MD;  Location: [facility] (4TH FLR);  Service: Endoscopy;  Laterality: N/A;  PM prep    COLONOSCOPY N/A 2022    Procedure: COLONOSCOPY;  Surgeon: Ramiro Jefferson MD;  Location: [facility] (2ND FLR);  Service: Endoscopy;  Laterality: N/A;  EGD and colonoscopy in 6-8 weeks from now     states has constipation. -extended Golytely prep    CYSTOSCOPY N/A 2021    Procedure: CYSTOSCOPY;  Surgeon: Viridiana Valenzuela MD;  Location: [facility] OR 1ST FLR;  Service: Urology;  Laterality: N/A;    ENDOSCOPIC ULTRASOUND OF UPPER GASTROINTESTINAL TRACT N/A 2018    Procedure: ULTRASOUND, UPPER GI TRACT, ENDOSCOPIC;  Surgeon: Jose Hess MD;  Location: [facility];  Service: Endoscopy;  Laterality: N/A;    ENDOSCOPIC ULTRASOUND OF UPPER GASTROINTESTINAL TRACT N/A 2019    Procedure: ULTRASOUND, UPPER GI TRACT, ENDOSCOPIC;  Surgeon: Jose Hess MD;  Location: [facility] (2ND FLR);  Service: Endoscopy;  Laterality: N/A;    ESOPHAGOGASTRODUODENOSCOPY N/A 2018    Procedure: EGD (ESOPHAGOGASTRODUODENOSCOPY);  Surgeon: Angelo Reynolds MD;  Location: [facility] (2ND FLR);  Service: Endoscopy;  Laterality: N/A;    ESOPHAGOGASTRODUODENOSCOPY N/A 2019     Procedure: EGD (ESOPHAGOGASTRODUODENOSCOPY);  Surgeon: Ramiro Jefferson MD;  Location: [facility] ENDO (4TH FLR);  Service: Endoscopy;  Laterality: N/A;    ESOPHAGOGASTRODUODENOSCOPY N/A 05/04/2022    Procedure: EGD (ESOPHAGOGASTRODUODENOSCOPY);  Surgeon: Ramiro Jefferson MD;  Location: [facility] ENDO (2ND FLR);  Service: Endoscopy;  Laterality: N/A;  fully vaccinated-GT    EYE SURGERY      Cataract Removal    FLUOROSCOPIC URODYNAMIC STUDY N/A 11/09/2021    Procedure: URODYNAMIC STUDY, FLUOROSCOPIC;  Surgeon: Viridiana Valenzuela MD;  Location: [facility] OR 1ST FLR;  Service: Urology;  Laterality: N/A;  90 minutes     HYSTERECTOMY      JOINT REPLACEMENT      OOPHORECTOMY  1970    posterolateral fusion with autograft bone and [place] mineralized bone matrix  02/01/2013    at [facility] for lumbar spine stenosis    SMALL INTESTINE SURGERY      SPINE SURGERY      TOE SURGERY      TONSILLECTOMY      TRANSFORAMINAL EPIDURAL INJECTION OF STEROID Bilateral 12/21/2022    Procedure: Injection,steroid,epidural, Todd L5/S1 TF CONTRAST DIRECT REFERRAL;  Surgeon: Toni Osorio MD;  Location: [facility] PAIN MGT;  Service: Pain Management;  Laterality: Bilateral;    TRIGGER FINGER RELEASE       Family History       Problem Relation (Age of Onset)    Alcohol abuse Brother, Brother, Brother    Arthritis Daughter, Brother, Brother    Asthma Daughter, Daughter    Bladder Cancer Mother    Breast cancer Sister (79)    Cataracts Mother    Colon cancer Father, Brother (70)    Depression Daughter, Daughter, Daughter    Glaucoma Mother    Heart disease Father (50), Mother    Hyperlipidemia Mother    Hypertension Daughter    Kidney disease Mother    Parkinsonism Brother    Stroke Daughter (40), Daughter          Tobacco Use    Smoking status: Never    Smokeless tobacco: Never   Substance and Sexual Activity    Alcohol use: Not Currently     Comment: socially, rarely    Drug use: Never    Sexual activity: Not Currently     Review of Systems    Constitutional:  Negative for fever.   Gastrointestinal:  Positive for abdominal pain and nausea. Negative for blood in stool, constipation, diarrhea and vomiting.   Genitourinary:  Negative for difficulty urinating.     Objective:     Vital Signs (Most Recent):  Temp: 98.2 °F (36.8 °C) (09/28/23 0707)  Pulse: 98 (09/28/23 0707)  Resp: 14 (09/28/23 0707)  BP: (!) 105/58 (09/28/23 0707)  SpO2: (!) 94 % (09/28/23 0707) Vital Signs (24h Range):  Temp:  [97.6 °F (36.4 °C)-98.3 °F (36.8 °C)] 98.2 °F (36.8 °C)  Pulse:  [] 98  Resp:  [14-24] 14  SpO2:  [93 %-100 %] 94 %  BP: (105-133)/(57-64) 105/58     Weight: 61.2 kg (134 lb 14.7 oz)  Body mass index is 24.68 kg/m².     Physical Exam  Vitals and nursing note reviewed.   Constitutional:       General: She is not in acute distress.     Appearance: She is well-developed. She is not ill-appearing.   HENT:      Head: Normocephalic and atraumatic.   Eyes:      General:         Right eye: No discharge.         Left eye: No discharge.      Conjunctiva/sclera: Conjunctivae normal.   Cardiovascular:      Rate and Rhythm: Normal rate and regular rhythm.   Pulmonary:      Effort: Pulmonary effort is normal. No respiratory distress.   Abdominal:      General: There is no distension.      Palpations: Abdomen is soft.      Tenderness: There is abdominal tenderness (mild, diffuse). There is no guarding or rebound.   Musculoskeletal:         General: No deformity. Normal range of motion.      Cervical back: Normal range of motion.   Skin:     General: Skin is warm and dry.   Neurological:      Mental Status: She is alert and oriented to person, place, and time.   Psychiatric:         Behavior: Behavior normal.            I have reviewed all pertinent lab results within the past 24 hours.  CBC:   Recent Labs   Lab 09/27/23 2149   WBC 9.94   RBC 3.64*   HGB 12.9   HCT 38.1   PLT 96*   *   MCH 35.4*   MCHC 33.9     CMP:   Recent Labs   Lab 09/27/23  2149   *   CALCIUM  10.5   ALBUMIN 4.3   PROT 8.6*   *   K 4.4   CO2 22*      BUN 11   CREATININE 0.9   ALKPHOS 95   ALT 40   AST 42*   BILITOT 1.2*       Significant Diagnostics:  I have reviewed all pertinent imaging results/findings within the past 24 hours.    Imaging Results              XR NG/OG tube placement check, non-radiologist performed (Final result)  Result time 09/28/23 05:45:36   Procedure changed from X-Ray Abdomen AP 1 View (KUB)     Final result by Martha Zuluaga MD (09/28/23 05:45:36)                   Impression:      Please see above.      Electronically signed by: Martha Zuluaga MD  Date:    09/28/2023  Time:    05:45               Narrative:    EXAMINATION:  XR NG/OG TUBE PLACEMENT CHECK, NON-RADIOLOGIST PERFORMED    CLINICAL HISTORY:  NG placement; Encounter for other specified special examinations    TECHNIQUE:  AP View(s) of the abdomen was performed.    COMPARISON:  Correlation is made to CT abdomen and pelvis 09/27/2023    FINDINGS:  Enteric tube courses below the diaphragm with side port and tip projecting over the region of the stomach in the left upper quadrant.  There is gaseous distention of the stomach with air-fluid level.  There are the dilated loops of bowel throughout the abdomen, coinciding with alvin on prior CT.  Visualized lung bases appear grossly clear.                                       CT Abdomen Pelvis With Contrast (Final result)  Result time 09/28/23 03:13:07      Final result by Judson Ellison MD (09/28/23 03:13:07)                   Impression:      Small-bowel obstruction measuring up to 4 cm.  Extensive postoperative changes and matted loops of bowel to the anterior abdominal wall suggest adhesions.  No pneumoperitoneum, portal venous gas, or pneumatosis coli.    Proximal colonic wall thickening and surrounding inflammatory change likely reflecting sequela of colitis.  As the involvement seems to be greatest in the cecum, typhlitis might be a consideration.    Small  hiatal hernia.  Suspected persistent enhancing redundant mucosa at the gastric cardia.  Neoplasm felt less likely.  Clinical correlation and outpatient EGD as warranted.    Additional findings in the body of the report.    Findings were discovered at approximately 02:08 and discussed by Gonzalez Charlton MD with Yarely Farris MD, at 02:10 via telephone on9/28/2023.  Patient name and MRN were verified, and read back was performed.    Electronically signed by resident: Gonzalez Charlton  Date:    09/28/2023  Time:    02:15    Electronically signed by: Judson Ellison  Date:    09/28/2023  Time:    03:13               Narrative:    EXAMINATION:  CT ABDOMEN PELVIS WITH CONTRAST    CLINICAL HISTORY:  Bowel obstruction suspected;    TECHNIQUE:  Axial images of the abdomen and pelvis were acquired  after the use of 75 mL [id number] IV contrast.  Coronal and sagittal reconstructions were also obtained    COMPARISON:  CT abdomen pelvis 07/07/2023.    FINDINGS:  Heart: Normal in size. No pericardial effusion. Calcifications of the aortic valve.    Lungs: There is bibasilar atelectasis.  No airspace consolidation.    Liver: Normal in size and contour.  No focal hepatic lesion.  Portal vein is patent.  No portal venous gas.    Gallbladder: Surgically absent.    Bile Ducts: No evidence of dilated ducts.    Pancreas: Proximal pancreatic duct appears less dilated on today's examination as compared to prior.  No mass or peripancreatic fat stranding.    Spleen: Enlarged measuring 12 cm.    Stomach and duodenum: Small hiatal hernia.  Duodenum is distended.    Adrenals: Unremarkable.    Kidneys/ Ureters: Normal in size and location. Normal enhancement.  Small renal hypodensities, too small to characterize, unchanged from prior.  No hydronephrosis or nephrolithiasis. No ureteral dilatation.    Bladder: Bladder is distended.  No wall thickening.    Reproductive organs: Uterus is surgically absent.  No adnexal lesions.    Bowel/Mesentery:  Postoperative changes of partial bowel resection.  Multiple dilated loops of small bowel measuring up to 4.0 cm.  Many loops are in close approximation to the anterior abdominal wall and suggestive of underlying adhesions.    Cecal and ascending colon wall thickening and hyperenhancement, with surrounding fluid/inflammatory change.  The terminal ileum is collapsed.  The appendix is not identified; it is perhaps surgically absent.    Lymph nodes: No lymphadenapathy.    Vasculature: No aneurysm. Moderate calcific atherosclerosis.    Abdominal wall:  Postoperative changes of the anterior abdominal wall.    Bones: No acute fracture. No suspicious osseous lesions.  Lower lumbar facet arthropathy.  Pre-existing L5-S1 spondylolisthesis and bilateral L5 spondylolysis.                                          Assessment/Plan:     Small bowel obstruction due to adhesions  78 y.o. female with PMHx of DM II, GERD, DVT, multiple prior SBOs s/p ex lap in 2014, 2017, 2022 who presents with recurrent SBO    - Admit to gen surg  - NGT placement, continue to LIWS. Gastrograffin challenge to follow after decompression.   - NPO  - mIVF  - Pain meds prn  - Antiemetics prn  - Home levothyroxine ordered as IV  - Sliding scale insulin  - Daily labs      VTE Risk Mitigation (From admission, onward)         Ordered     IP VTE HIGH RISK PATIENT  Once         09/28/23 0605     Place sequential compression device  Until discontinued         09/28/23 0605                    Mickey Mckee MD  General Surgery  [facility] - Emergency Dept

## 2023-09-28 NOTE — PLAN OF CARE
Matias HAWK  Initial Discharge Assessment       SW met w/pt and daughter, Wendy, in room for Discharge Planning Assessment. Pt was able to answer questions. Per pt/family, pt lives alone in an apt on the 7th floor with elevator access. Per pt/family, she requires some assistance w/ADLs and used a RW, WC, and cane for ambulation. Pt also has a BSC and SC. Per pt/family, she has sitters available to her from 9am-5pm. Pt will have assistance from family/sitters upon discharge. All questions addressed. SW will follow for needs.    Primary Care Provider: Darci Guthrie MD    Admission Diagnosis: SBO (small bowel obstruction) [K56.609]  Encounter for imaging study to confirm nasogastric (NG) tube placement [Z01.89]  Abdominal pain [R10.9]    Admission Date: 9/27/2023  Expected Discharge Date: 9/29/2023         Payor: MEDICARE / Plan: MEDICARE PART A & B / Product Type: Government /     Extended Emergency Contact Information  Primary Emergency Contact: Danielle Whitley   [place]  Mobile Phone: [phone]  Relation: Daughter  Secondary Emergency Contact: Kimberly Horner  Address: [address]  Home Phone: [phone]  Relation: Daughter    Discharge Plan A: (P) Home with family, Home Health  Discharge Plan B: (P) Home with family, Home Health      [id number] - [address] & [address] [address]  Phone: [phone] Fax: [phone]    [facility] - [place], [place] - [address]  [address]  [facility] [address]  [facility] [address]  Phone: [phone] Fax: [phone]    [id number] - [place], [place] - [address] AT [facility] [address]  [address] [place] [address] AVE  [place] [address]  Phone: [phone] Fax: [phone]      Initial Assessment (most recent)       Adult Discharge Assessment -  09/28/23 1659          Discharge Assessment    Assessment Type Discharge Planning Assessment     Confirmed/corrected address, phone number and insurance Yes     Confirmed Demographics Correct on Facesheet     Source of Information patient;family   Wendy (daughter) [phone]    Communicated LOI with patient/caregiver Date not available/Unable to determine     Reason For Admission Small bowel obstruction     People in Home alone     Do you expect to return to your current living situation? Yes     Do you have help at home or someone to help you manage your care at home? Yes     Who are your caregiver(s) and their phone number(s)? Pt has sitters with her from 9am-5pm.     Prior to hospitilization cognitive status: Unable to Assess     Current cognitive status: Alert/Oriented     Walking or Climbing Stairs ambulation difficulty, requires equipment     Dressing/Bathing bathing difficulty, requires equipment     Do you have any problems with: --   Family/sitters assist    Home Layout Other (see comments)   Pt lives in 7th floor apt    Equipment Currently Used at Home walker, rolling;cane, straight;wheelchair;shower chair;bedside commode     Readmission within 30 days? No     Patient currently being followed by outpatient case management? Unable to determine (comments)     Do you currently have service(s) that help you manage your care at home? Yes (P)      Name and Contact number of agency [facility] (P)      Is the pt/caregiver preference to resume services with current agency Yes (P)      Do you take prescription medications? Yes (P)      Do you have prescription coverage? Yes (P)      Coverage MEDICARE - MEDICARE PART A & B (P)      Do you have any problems affording any of your prescribed medications? TBD (P)      Who is going to help you get home at discharge? Family/sitters (P)      How do you get to doctors appointments? family or friend will provide (P)      Are you on dialysis? No (P)      Do you take  coumadin? No (P)      DME Needed Upon Discharge  other (see comments) (P)    TBD    Discharge Plan discussed with: Patient;Adult children (P)      Discharge Plan A Home with family;Home Health (P)      Discharge Plan B Home with family;Home Health (P)         Physical Activity    On average, how many days per week do you engage in moderate to strenuous exercise (like a brisk walk)? 0 days (P)      On average, how many minutes do you engage in exercise at this level? 0 min (P)         Financial Resource Strain    How hard is it for you to pay for the very basics like food, housing, medical care, and heating? Not hard at all (P)         Housing Stability    In the last 12 months, was there a time when you were not able to pay the mortgage or rent on time? No (P)      In the last 12 months, how many places have you lived? 1 (P)      In the last 12 months, was there a time when you did not have a steady place to sleep or slept in a shelter (including now)? No (P)         Transportation Needs    In the past 12 months, has lack of transportation kept you from medical appointments or from getting medications? No (P)      In the past 12 months, has lack of transportation kept you from meetings, work, or from getting things needed for daily living? No (P)         Food Insecurity    Within the past 12 months, you worried that your food would run out before you got the money to buy more. Never true (P)      Within the past 12 months, the food you bought just didn't last and you didn't have money to get more. Never true (P)         Social Connections    In a typical week, how many times do you talk on the phone with family, friends, or neighbors? More than three times a week (P)      How often do you get together with friends or relatives? More than three times a week (P)      How often do you attend Mormon or Faith services? Never (P)      Do you belong to any clubs or organizations such as Mormon groups, unions, fraternal  or athletic groups, or school groups? No (P)      How often do you attend meetings of the clubs or organizations you belong to? Never (P)         Alcohol Use    Q1: How often do you have a drink containing alcohol? -- (P)    Pt hasn't had alcohol in over a year    Q2: How many drinks containing alcohol do you have on a typical day when you are drinking? Patient does not drink (P)      Q3: How often do you have six or more drinks on one occasion? Never (P)                    LISETH Turner, CSW    Case Management Department

## 2023-09-28 NOTE — ED PROVIDER NOTES
Encounter Date: 2023       History     Chief Complaint   Patient presents with    Abdominal Pain     Pt arrives c/o abdominal pain. C/o nausea. 50mcg IM Fentanyl en route. 4mg IM Zofran given en route.     78-year-old female with past medical history of colon cancer, DM T2, GERD, hyperlipidemia who presents emergency department for lower abdominal pain.  Reports soreness to the lower abdomen which is similar to previous small-bowel obstructions.  Reports she is had multiple small-bowel obstructions which were treated nonoperatively.  She does have significant history of multiple abdominal surgeries.  States she is able to pass gas and had a small loose bowel movement earlier today.  States she normally does not have firm bowel movements.  Is any fevers/chills, chest pain, shortness of breath, urinary symptoms.  She does endorse some nausea and received Zofran with EMS prior to arrival.    The history is provided by the patient.     Review of patient's allergies indicates:   Allergen Reactions    Codeine Itching and Nausea And Vomiting    Dilaudid [hydromorphone (bulk)] Other (See Comments)     Oversedating, head burning. Pt prefers to avoid.       Percocet [oxycodone-acetaminophen] Itching    Sulfa (sulfonamide antibiotics) Itching and Nausea And Vomiting           Latex, natural rubber Rash     Past Medical History:   Diagnosis Date    Abdominal pain     Allergic rhinitis     Arthritis     Blood platelet disorder     Blood platelet disorder     Blood transfusion     during delivery and     Bowel obstruction     Cervical radiculopathy     followed by dr cloud    Colon cancer     transverse colon; resected; Stage IIA (pT3 pN0 MX)    Degenerative joint disease (DJD) of lumbar spine     Diabetes mellitus     Diarrhea     Falls 2020    Family history of breast cancer     Family history of colon cancer     Fatty liver     GERD (gastroesophageal reflux disease)     History of shingles      Hyperlipidemia     Hypomagnesemia     Hypothyroidism     Irritable bowel syndrome     Microscopic colitis     treated     Migraine     Myelodysplastic syndrome     Raynaud phenomenon     Raynaud's disease     Squamous cell carcinoma of skin     Type 2 diabetes mellitus     Usual hyperplasia of lactiferous duct      Past Surgical History:   Procedure Laterality Date    ADENOIDECTOMY      APPENDECTOMY      BACK SURGERY      BREAST BIOPSY  1970    BREAST CYST EXCISION  1970?    BREAST LUMPECTOMY  1970    CARPAL TUNNEL RELEASE      bilateral      SECTION      CHOLECYSTECTOMY  1965    COLECTOMY  2011    Transverse colon resection by Dr. Aguirre    COLONOSCOPY N/A 2017    Procedure: COLONOSCOPY;  Surgeon: Manjit Alvarez MD;  Location: [facility] (4TH FLR);  Service: Endoscopy;  Laterality: N/A;    COLONOSCOPY N/A 2019    Procedure: COLONOSCOPY;  Surgeon: Ramiro Jefferson MD;  Location: [facility] (4TH FLR);  Service: Endoscopy;  Laterality: N/A;  PM prep    COLONOSCOPY N/A 2022    Procedure: COLONOSCOPY;  Surgeon: Ramiro Jefferson MD;  Location: [facility] (2ND FLR);  Service: Endoscopy;  Laterality: N/A;  EGD and colonoscopy in 6-8 weeks from now     states has constipation. -extended Golytely prep    CYSTOSCOPY N/A 2021    Procedure: CYSTOSCOPY;  Surgeon: Viridiana Valenzuela MD;  Location: [facility] OR 1ST FLR;  Service: Urology;  Laterality: N/A;    ENDOSCOPIC ULTRASOUND OF UPPER GASTROINTESTINAL TRACT N/A 2018    Procedure: ULTRASOUND, UPPER GI TRACT, ENDOSCOPIC;  Surgeon: Jose Hess MD;  Location: [facility];  Service: Endoscopy;  Laterality: N/A;    ENDOSCOPIC ULTRASOUND OF UPPER GASTROINTESTINAL TRACT N/A 2019    Procedure: ULTRASOUND, UPPER GI TRACT, ENDOSCOPIC;  Surgeon: Jose Hess MD;  Location: [facility] (2ND FLR);  Service: Endoscopy;  Laterality: N/A;    ESOPHAGOGASTRODUODENOSCOPY N/A 2018    Procedure: EGD (ESOPHAGOGASTRODUODENOSCOPY);   Surgeon: Angelo Reynolds MD;  Location: [facility] ENDO (2ND FLR);  Service: Endoscopy;  Laterality: N/A;    ESOPHAGOGASTRODUODENOSCOPY N/A 06/26/2019    Procedure: EGD (ESOPHAGOGASTRODUODENOSCOPY);  Surgeon: Ramiro Jefferson MD;  Location: [facility] ENDO (4TH FLR);  Service: Endoscopy;  Laterality: N/A;    ESOPHAGOGASTRODUODENOSCOPY N/A 05/04/2022    Procedure: EGD (ESOPHAGOGASTRODUODENOSCOPY);  Surgeon: Ramiro Jefferson MD;  Location: [facility] ENDO (2ND FLR);  Service: Endoscopy;  Laterality: N/A;  fully vaccinated-GT    EYE SURGERY      Cataract Removal    FLUOROSCOPIC URODYNAMIC STUDY N/A 11/09/2021    Procedure: URODYNAMIC STUDY, FLUOROSCOPIC;  Surgeon: Viridiana Valenzuela MD;  Location: [facility] OR 1ST FLR;  Service: Urology;  Laterality: N/A;  90 minutes     HYSTERECTOMY      JOINT REPLACEMENT      OOPHORECTOMY  1970    posterolateral fusion with autograft bone and Eun mineralized bone matrix  02/01/2013    at [facility] for lumbar spine stenosis    SMALL INTESTINE SURGERY      SPINE SURGERY      TOE SURGERY      TONSILLECTOMY      TRANSFORAMINAL EPIDURAL INJECTION OF STEROID Bilateral 12/21/2022    Procedure: Injection,steroid,epidural, Todd L5/S1 TF CONTRAST DIRECT REFERRAL;  Surgeon: Toni Osorio MD;  Location: [facility];  Service: Pain Management;  Laterality: Bilateral;    TRIGGER FINGER RELEASE       Family History   Problem Relation Age of Onset    Heart disease Father 50        Mi age 50    Colon cancer Father     Bladder Cancer Mother         non smoker    Cataracts Mother     Glaucoma Mother     Heart disease Mother     Hyperlipidemia Mother     Kidney disease Mother     Breast cancer Sister 79    Arthritis Daughter     Asthma Daughter     Depression Daughter     Hypertension Daughter     Stroke Daughter 40    Arthritis Brother     Colon cancer Brother 70    Alcohol abuse Brother     Parkinsonism Brother     Alcohol abuse Brother     Depression Daughter     Stroke Daughter     Alcohol abuse Brother     Arthritis  Brother     Asthma Daughter     Depression Daughter     Celiac disease Neg Hx     Cirrhosis Neg Hx     Colon polyps Neg Hx     Crohn's disease Neg Hx     Cystic fibrosis Neg Hx     Esophageal cancer Neg Hx     Hemochromatosis Neg Hx     Inflammatory bowel disease Neg Hx     Irritable bowel syndrome Neg Hx     Liver cancer Neg Hx     Liver disease Neg Hx     Rectal cancer Neg Hx     Stomach cancer Neg Hx     Ulcerative colitis Neg Hx     Eliazar's disease Neg Hx     Amblyopia Neg Hx     Blindness Neg Hx     Macular degeneration Neg Hx     Retinal detachment Neg Hx     Strabismus Neg Hx     Melanoma Neg Hx      Social History     Tobacco Use    Smoking status: Never    Smokeless tobacco: Never   Substance Use Topics    Alcohol use: Not Currently     Comment: socially, rarely    Drug use: Never     Review of Systems   Constitutional:  Negative for chills and fever.   HENT:  Negative for sore throat.    Respiratory:  Negative for cough and shortness of breath.    Cardiovascular:  Negative for chest pain.   Gastrointestinal:  Positive for abdominal pain and nausea.   Genitourinary:  Negative for difficulty urinating and dysuria.   Musculoskeletal: Negative.    Skin: Negative.    Neurological:  Negative for weakness.   Psychiatric/Behavioral:  Negative for confusion.        Physical Exam     Initial Vitals [09/27/23 1947]   BP Pulse Resp Temp SpO2   127/61 77 18 97.6 °F (36.4 °C) 100 %      MAP       --         Physical Exam    Nursing note and vitals reviewed.  Constitutional: She appears well-developed and well-nourished.   HENT:   Head: Normocephalic and atraumatic.   Eyes: Conjunctivae are normal.   Neck: Neck supple.   Normal range of motion.  Cardiovascular:  Normal rate.           Pulmonary/Chest: Breath sounds normal.   Abdominal: Abdomen is soft. Bowel sounds are normal. There is abdominal tenderness (Tender across lower abdomen).   Musculoskeletal:         General: Normal range of motion.      Cervical back:  Normal range of motion and neck supple.     Neurological: She is alert and oriented to person, place, and time. GCS score is 15. GCS eye subscore is 4. GCS verbal subscore is 5. GCS motor subscore is 6.   Skin: Skin is warm and dry. Capillary refill takes less than 2 seconds.         ED Course   Procedures  Labs Reviewed   CBC W/ AUTO DIFFERENTIAL - Abnormal; Notable for the following components:       Result Value    RBC 3.64 (*)      (*)     MCH 35.4 (*)     Platelets 96 (*)     Gran % 77.0 (*)     Lymph % 14.0 (*)     All other components within normal limits   COMPREHENSIVE METABOLIC PANEL - Abnormal; Notable for the following components:    Sodium 134 (*)     CO2 22 (*)     Glucose 190 (*)     Total Protein 8.6 (*)     Total Bilirubin 1.2 (*)     AST 42 (*)     All other components within normal limits   LIPASE   URINALYSIS, REFLEX TO URINE CULTURE   ISTAT CHEM8          Imaging Results    None          Medications   lactated ringers bolus 1,000 mL (1,000 mLs Intravenous New Bag 9/27/23 2144)   morphine injection 4 mg (4 mg Intravenous Given 9/27/23 2115)     Medical Decision Making  78-year-old female presents emergency department for lower abdominal pain and nausea vomiting.      Differential includes but not limited to diverticulitis, UTI, pyelonephritis, electrolyte derangement, dehydration, p.o., peritonitis     On exam she does have tenderness to the lower abdomen.  Significant history of multiple abdominal surgeries as well as recurrent partial SBO.  Will obtain labs and CT of the abdomen.  At shift change pending labs and imaging.  Dispo pending imaging    Amount and/or Complexity of Data Reviewed  Labs: ordered.  Radiology: ordered.    Risk  Prescription drug management.                               Clinical Impression:   Final diagnoses:  [R10.9] Abdominal pain               Alisa Shepherd PA-C  09/27/23 5531        I evaluated this patient at bedside and completed a physical exam. Patient  uncomfortable and diffusely tender abdomen but nontoxic. The ANTONETTE and I discussed the critical portions of the decision making.   Damaris Funk MD  09/27/23 7393       Damaris Brown MD  09/27/23 8610

## 2023-09-28 NOTE — ASSESSMENT & PLAN NOTE
78 y.o. female with PMHx of DM II, GERD, DVT, multiple prior SBOs s/p ex lap in 2014, 2017, 2022 who presents with recurrent SBO    - Admit to gen surg  - NGT placement, continue to LIWS. Gastrograffin challenge to follow after decompression.   - NPO  - mIVF  - Pain meds prn  - Antiemetics prn  - Home levothyroxine ordered as IV  - Sliding scale insulin  - Daily labs

## 2023-09-28 NOTE — SUBJECTIVE & OBJECTIVE
No current facility-administered medications on file prior to encounter.     Current Outpatient Medications on File Prior to Encounter   Medication Sig    acetaminophen (TYLENOL) 650 MG TbSR Take 1,300 mg by mouth 2 (two) times a day.    ascorbic acid, vitamin C, (VITAMIN C) 1000 MG tablet Take 1,000 mg by mouth once daily.    atorvastatin (LIPITOR) 40 MG tablet Take 40 mg by mouth once daily.    biotin 10,000 mcg TbDL Take 1 tablet by mouth once daily.     calcium-vitamin D3 (OS-KASSANDRA 500 + D3) 500 mg-5 mcg (200 unit) per tablet Take 1 tablet by mouth once daily.    conjugated estrogens (PREMARIN) vaginal cream INSERT 0.5 GRAM VAGINALLY 2 TIMES A WEEK    cranberry extract 200 mg Cap Take 1 capsule by mouth once daily.    donepeziL (ARICEPT) 10 MG tablet TAKE 1 TABLET(10 MG) BY MOUTH EVERY EVENING    DULoxetine (CYMBALTA) 30 MG capsule TAKE 1 CAPSULE BY MOUTH TWICE  DAILY    ferrous sulfate 324 mg (65 mg iron) TbEC Take 1 tablet (324 mg total) by mouth once daily.    flash glucose scanning reader ([place]STYLE EFREN 14 DAY READER) Misc Check blood glucose level 3 times daily.    flash glucose sensor (FREESTYLE EFREN 14 DAY SENSOR) Kit 1 application by Misc.(Non-Drug; Combo Route) route every 14 (fourteen) days. Disp 30 or 90 day refill    gabapentin (NEURONTIN) 300 MG capsule Take 300 mg by mouth 3 (three) times daily.    HYDROcodone-acetaminophen (NORCO) 5-325 mg per tablet Take 1 tablet by mouth every 6 (six) hours as needed for Pain.    hydrocortisone 2.5 % cream Apply topically 2 (two) times daily as needed. (Patient not taking: Reported on 7/11/2023)    levothyroxine (SYNTHROID) 75 MCG tablet TAKE 1 TABLET(75 MCG) BY MOUTH BEFORE BREAKFAST    levothyroxine (SYNTHROID) 75 MCG tablet Take 1 tablet (75 mcg total) by mouth before breakfast. (Patient not taking: Reported on 7/11/2023)    LIDOcaine (LIDODERM) 5 % APPLY 1 TO 3 PATCHES TO BACK DAILY. REMOVE WITHIN 12 HOURS    linaGLIPtin (TRADJENTA) 5 mg Tab tablet Take 1  tablet (5 mg total) by mouth once daily.    metFORMIN (GLUCOPHAGE) 500 MG tablet Take 1 tablet (500 mg total) by mouth 2 (two) times daily with meals.    mometasone (ELOCON) 0.1 % ointment Apply topically 2 (two) times a day. Mix 50/50 with mupirocin ointment. Apply in each nostril twice daily.    montelukast (SINGULAIR) 10 mg tablet TAKE 1 TABLET BY MOUTH  DAILY    ondansetron (ZOFRAN-ODT) 8 MG TbDL Take 1 tablet (8 mg total) by mouth every 8 (eight) hours as needed (nausea). (Patient not taking: Reported on 7/11/2023)    pantoprazole (PROTONIX) 20 MG tablet Take 1 tablet (20 mg total) by mouth 2 (two) times a day.    polyethylene glycol (GLYCOLAX) 17 gram PwPk Take 17 g by mouth once daily.    traZODone (DESYREL) 50 MG tablet Take 1 tablet (50 mg total) by mouth every evening.    triamcinolone acetonide 0.1% (KENALOG) 0.1 % paste APPLY TO THE AFFECTED AREA WITH EVERY-TIP THREE TIMES DAILY    ubrogepant (UBRELVY) 100 mg tablet Take 1 tablet daily as needed for migraine headache. May take 1 additional tablet 2 hours later if needed. (Patient not taking: Reported on 7/11/2023)    valACYclovir (VALTREX) 1000 MG tablet Take 1 tablet (1,000 mg total) by mouth once daily.    vitamin D 1000 units Tab Take 1,000 Units by mouth once daily. D3    [DISCONTINUED] hyoscyamine (ANASPAZ,LEVSIN) 0.125 mg Tab TAKE 1 TABLET(125 MCG) BY MOUTH EVERY 8 HOURS AS NEEDED FOR URGENCY       Review of patient's allergies indicates:   Allergen Reactions    Codeine Itching and Nausea And Vomiting    Dilaudid [hydromorphone (bulk)] Other (See Comments)     Oversedating, head burning. Pt prefers to avoid.       Percocet [oxycodone-acetaminophen] Itching    Sulfa (sulfonamide antibiotics) Itching and Nausea And Vomiting           Latex, natural rubber Rash       Past Medical History:   Diagnosis Date    Abdominal pain     Allergic rhinitis     Arthritis     Blood platelet disorder     Blood platelet disorder     Blood transfusion     during  delivery and     Bowel obstruction     Cervical radiculopathy     followed by dr cloud    Colon cancer     transverse colon; resected; Stage IIA (pT3 pN0 MX)    Degenerative joint disease (DJD) of lumbar spine     Diabetes mellitus     Diarrhea     Falls 2020    Family history of breast cancer     Family history of colon cancer     Fatty liver     GERD (gastroesophageal reflux disease)     History of shingles     Hyperlipidemia     Hypomagnesemia     Hypothyroidism     Irritable bowel syndrome     Microscopic colitis     treated     Migraine     Myelodysplastic syndrome     Raynaud phenomenon     Raynaud's disease     Squamous cell carcinoma of skin     Type 2 diabetes mellitus     Usual hyperplasia of lactiferous duct      Past Surgical History:   Procedure Laterality Date    ADENOIDECTOMY      APPENDECTOMY      BACK SURGERY      BREAST BIOPSY  1970    BREAST CYST EXCISION  1970?    BREAST LUMPECTOMY  1970    CARPAL TUNNEL RELEASE      bilateral      SECTION      CHOLECYSTECTOMY  1965    COLECTOMY  2011    Transverse colon resection by Dr. Aguirre    COLONOSCOPY N/A 2017    Procedure: COLONOSCOPY;  Surgeon: Manjit Alvarez MD;  Location: [facility] (4TH FLR);  Service: Endoscopy;  Laterality: N/A;    COLONOSCOPY N/A 2019    Procedure: COLONOSCOPY;  Surgeon: Ramiro Jefferson MD;  Location: [facility] (4TH FLR);  Service: Endoscopy;  Laterality: N/A;  PM prep    COLONOSCOPY N/A 2022    Procedure: COLONOSCOPY;  Surgeon: Ramiro Jefferson MD;  Location: [facility] (2ND FLR);  Service: Endoscopy;  Laterality: N/A;  EGD and colonoscopy in 6-8 weeks from now     states has constipation. -extended Golytely prep    CYSTOSCOPY N/A 2021    Procedure: CYSTOSCOPY;  Surgeon: Viridiana Valenzuela MD;  [address]  Service: Urology;  Laterality: N/A;    ENDOSCOPIC ULTRASOUND OF UPPER GASTROINTESTINAL TRACT N/A 2018    Procedure: ULTRASOUND, UPPER GI  TRACT, ENDOSCOPIC;  Surgeon: Jose Hess MD;  Location: [facility] ENDO;  Service: Endoscopy;  Laterality: N/A;    ENDOSCOPIC ULTRASOUND OF UPPER GASTROINTESTINAL TRACT N/A 01/23/2019    Procedure: ULTRASOUND, UPPER GI TRACT, ENDOSCOPIC;  Surgeon: Jose Hess MD;  Location: [facility] (2ND FLR);  Service: Endoscopy;  Laterality: N/A;    ESOPHAGOGASTRODUODENOSCOPY N/A 11/16/2018    Procedure: EGD (ESOPHAGOGASTRODUODENOSCOPY);  Surgeon: Angelo Reynolds MD;  Location: [facility] (2ND FLR);  Service: Endoscopy;  Laterality: N/A;    ESOPHAGOGASTRODUODENOSCOPY N/A 06/26/2019    Procedure: EGD (ESOPHAGOGASTRODUODENOSCOPY);  Surgeon: Ramiro Jefferson MD;  Location: [facility] (4TH FLR);  Service: Endoscopy;  Laterality: N/A;    ESOPHAGOGASTRODUODENOSCOPY N/A 05/04/2022    Procedure: EGD (ESOPHAGOGASTRODUODENOSCOPY);  Surgeon: Ramiro Jefferson MD;  Location: [facility] (2ND FLR);  Service: Endoscopy;  Laterality: N/A;  fully vaccinated-GT    EYE SURGERY      Cataract Removal    FLUOROSCOPIC URODYNAMIC STUDY N/A 11/09/2021    Procedure: URODYNAMIC STUDY, FLUOROSCOPIC;  Surgeon: Viridiana Valenzuela MD;  Location: [facility] OR 1ST FLR;  Service: Urology;  Laterality: N/A;  90 minutes     HYSTERECTOMY      JOINT REPLACEMENT      OOPHORECTOMY  1970    posterolateral fusion with autograft bone and Eun mineralized bone matrix  02/01/2013    at [facility] for lumbar spine stenosis    SMALL INTESTINE SURGERY      SPINE SURGERY      TOE SURGERY      TONSILLECTOMY      TRANSFORAMINAL EPIDURAL INJECTION OF STEROID Bilateral 12/21/2022    Procedure: Injection,steroid,epidural, Todd L5/S1 TF CONTRAST DIRECT REFERRAL;  Surgeon: Toni Osorio MD;  Location: [facility] PAIN MGT;  Service: Pain Management;  Laterality: Bilateral;    TRIGGER FINGER RELEASE       Family History       Problem Relation (Age of Onset)    Alcohol abuse Brother, Brother, Brother    Arthritis Daughter, Brother, Brother    Asthma Daughter, Daughter    Bladder Cancer Mother     Breast cancer Sister (79)    Cataracts Mother    Colon cancer Father, Brother (70)    Depression Daughter, Daughter, Daughter    Glaucoma Mother    Heart disease Father (50), Mother    Hyperlipidemia Mother    Hypertension Daughter    Kidney disease Mother    Parkinsonism Brother    Stroke Daughter (40), Daughter          Tobacco Use    Smoking status: Never    Smokeless tobacco: Never   Substance and Sexual Activity    Alcohol use: Not Currently     Comment: socially, rarely    Drug use: Never    Sexual activity: Not Currently     Review of Systems   Constitutional:  Negative for fever.   Gastrointestinal:  Positive for abdominal pain and nausea. Negative for blood in stool, constipation, diarrhea and vomiting.   Genitourinary:  Negative for difficulty urinating.     Objective:     Vital Signs (Most Recent):  Temp: 98.2 °F (36.8 °C) (09/28/23 0707)  Pulse: 98 (09/28/23 0707)  Resp: 14 (09/28/23 0707)  BP: (!) 105/58 (09/28/23 0707)  SpO2: (!) 94 % (09/28/23 0707) Vital Signs (24h Range):  Temp:  [97.6 °F (36.4 °C)-98.3 °F (36.8 °C)] 98.2 °F (36.8 °C)  Pulse:  [] 98  Resp:  [14-24] 14  SpO2:  [93 %-100 %] 94 %  BP: (105-133)/(57-64) 105/58     Weight: 61.2 kg (134 lb 14.7 oz)  Body mass index is 24.68 kg/m².     Physical Exam  Vitals and nursing note reviewed.   Constitutional:       General: She is not in acute distress.     Appearance: She is well-developed. She is not ill-appearing.   HENT:      Head: Normocephalic and atraumatic.   Eyes:      General:         Right eye: No discharge.         Left eye: No discharge.      Conjunctiva/sclera: Conjunctivae normal.   Cardiovascular:      Rate and Rhythm: Normal rate and regular rhythm.   Pulmonary:      Effort: Pulmonary effort is normal. No respiratory distress.   Abdominal:      General: There is no distension.      Palpations: Abdomen is soft.      Tenderness: There is abdominal tenderness (mild, diffuse). There is no guarding or rebound.   Musculoskeletal:          General: No deformity. Normal range of motion.      Cervical back: Normal range of motion.   Skin:     General: Skin is warm and dry.   Neurological:      Mental Status: She is alert and oriented to person, place, and time.   Psychiatric:         Behavior: Behavior normal.            I have reviewed all pertinent lab results within the past 24 hours.  CBC:   Recent Labs   Lab 09/27/23 2149   WBC 9.94   RBC 3.64*   HGB 12.9   HCT 38.1   PLT 96*   *   MCH 35.4*   MCHC 33.9     CMP:   Recent Labs   Lab 09/27/23 2149   *   CALCIUM 10.5   ALBUMIN 4.3   PROT 8.6*   *   K 4.4   CO2 22*      BUN 11   CREATININE 0.9   ALKPHOS 95   ALT 40   AST 42*   BILITOT 1.2*       Significant Diagnostics:  I have reviewed all pertinent imaging results/findings within the past 24 hours.    Imaging Results              XR NG/OG tube placement check, non-radiologist performed (Final result)  Result time 09/28/23 05:45:36   Procedure changed from X-Ray Abdomen AP 1 View (KUB)     Final result by Martha Zuluaga MD (09/28/23 05:45:36)                   Impression:      Please see above.      Electronically signed by: Martha Zuluaga MD  Date:    09/28/2023  Time:    05:45               Narrative:    EXAMINATION:  XR NG/OG TUBE PLACEMENT CHECK, NON-RADIOLOGIST PERFORMED    CLINICAL HISTORY:  NG placement; Encounter for other specified special examinations    TECHNIQUE:  AP View(s) of the abdomen was performed.    COMPARISON:  Correlation is made to CT abdomen and pelvis 09/27/2023    FINDINGS:  Enteric tube courses below the diaphragm with side port and tip projecting over the region of the stomach in the left upper quadrant.  There is gaseous distention of the stomach with air-fluid level.  There are the dilated loops of bowel throughout the abdomen, coinciding with alvin on prior CT.  Visualized lung bases appear grossly clear.                                       CT Abdomen Pelvis With Contrast (Final  result)  Result time 09/28/23 03:13:07      Final result by Judson Ellison MD (09/28/23 03:13:07)                   Impression:      Small-bowel obstruction measuring up to 4 cm.  Extensive postoperative changes and matted loops of bowel to the anterior abdominal wall suggest adhesions.  No pneumoperitoneum, portal venous gas, or pneumatosis coli.    Proximal colonic wall thickening and surrounding inflammatory change likely reflecting sequela of colitis.  As the involvement seems to be greatest in the cecum, typhlitis might be a consideration.    Small hiatal hernia.  Suspected persistent enhancing redundant mucosa at the gastric cardia.  Neoplasm felt less likely.  Clinical correlation and outpatient EGD as warranted.    Additional findings in the body of the report.    Findings were discovered at approximately 02:08 and discussed by Gonzalez Charlton MD with Yarely Farris MD, at 02:10 via telephone on9/28/2023.  Patient name and MRN were verified, and read back was performed.    Electronically signed by resident: Gonzalez Charlton  Date:    09/28/2023  Time:    02:15    Electronically signed by: Judson Ellison  Date:    09/28/2023  Time:    03:13               Narrative:    EXAMINATION:  CT ABDOMEN PELVIS WITH CONTRAST    CLINICAL HISTORY:  Bowel obstruction suspected;    TECHNIQUE:  Axial images of the abdomen and pelvis were acquired  after the use of 75 mL [id number] IV contrast.  Coronal and sagittal reconstructions were also obtained    COMPARISON:  CT abdomen pelvis 07/07/2023.    FINDINGS:  Heart: Normal in size. No pericardial effusion. Calcifications of the aortic valve.    Lungs: There is bibasilar atelectasis.  No airspace consolidation.    Liver: Normal in size and contour.  No focal hepatic lesion.  Portal vein is patent.  No portal venous gas.    Gallbladder: Surgically absent.    Bile Ducts: No evidence of dilated ducts.    Pancreas: Proximal pancreatic duct appears less dilated on today's  examination as compared to prior.  No mass or peripancreatic fat stranding.    Spleen: Enlarged measuring 12 cm.    Stomach and duodenum: Small hiatal hernia.  Duodenum is distended.    Adrenals: Unremarkable.    Kidneys/ Ureters: Normal in size and location. Normal enhancement.  Small renal hypodensities, too small to characterize, unchanged from prior.  No hydronephrosis or nephrolithiasis. No ureteral dilatation.    Bladder: Bladder is distended.  No wall thickening.    Reproductive organs: Uterus is surgically absent.  No adnexal lesions.    Bowel/Mesentery: Postoperative changes of partial bowel resection.  Multiple dilated loops of small bowel measuring up to 4.0 cm.  Many loops are in close approximation to the anterior abdominal wall and suggestive of underlying adhesions.    Cecal and ascending colon wall thickening and hyperenhancement, with surrounding fluid/inflammatory change.  The terminal ileum is collapsed.  The appendix is not identified; it is perhaps surgically absent.    Lymph nodes: No lymphadenapathy.    Vasculature: No aneurysm. Moderate calcific atherosclerosis.    Abdominal wall:  Postoperative changes of the anterior abdominal wall.    Bones: No acute fracture. No suspicious osseous lesions.  Lower lumbar facet arthropathy.  Pre-existing L5-S1 spondylolisthesis and bilateral L5 spondylolysis.

## 2023-09-28 NOTE — PROVIDER PROGRESS NOTES - EMERGENCY DEPT.
Encounter Date: 9/27/2023    ED Physician Progress Notes        ED Physician Hand-off Note:    ED Course: I assumed care of patient from off-going ED physician team. Briefly, Patient is an 77 yo F with PMH of recurrent SBO who presents with abdominal pain across her abdomen at the level of the umbilicus. She is concerned she has another SBO. She was given pain meds with EMS but it did not improve her pain. She reports a small bowel movement this past afternoon.      At the time of signout plan was pending - CT abdomen and pelvis, labs, reassment.    Medications given in the ED:    Medications   lactated ringers bolus 1,000 mL (1,000 mLs Intravenous New Bag 9/27/23 2144)   morphine injection 4 mg (4 mg Intravenous Given 9/27/23 2115)     Imaging Results    None         11:13 PM  Patient reports severe pain.     Found to have an SBO, NG tube placed  General surgery admitted    Disposition: admit    Patient comfortable with plan for admission. Patient counseled regarding exam, results, diagnosis, treatment, and plan.    Impression: Final diagnoses:  [R10.9] Abdominal pain

## 2023-09-28 NOTE — H&P
Please see consult note by Dr. Mickey Mckee dated 9/28/23 for full H&P      Canelo Grover PA-C  General Surgery   [facility]

## 2023-09-28 NOTE — ED TRIAGE NOTES
"Pt c/o 8/10 abdominal pain and nausea x 8 hrs. Pt states she has a hx of SBO and that this feels like last time. Pt given 4 mg zofran and 50 mcg Fentanyl in route. Pt states, "this happens all the time. They won't do surgery on me anymore. They just give me oral contrast and I'm able to clear it." Pt reports last BM was earlier today, but that it was "brown liquid with a couple chunks in it."  "

## 2023-09-28 NOTE — HPI
Anayeli Judd is a 78 y.o. female with PMHx of DM II, GERD, DVT, multiple prior SBOs s/p ex lap in 2014, 2017, 2022 who presents with abdominal pain. States the pain is diffuse, onset about one day ago. She also c/o nausea, though this has improved with meds in the ED. She last passed BM and flatus yesterday. This symptoms are consistent with her prior admissions for SBO. She otherwise denies other recent changes in her health. She was last admitted for SBO in July 2023, was managed conservatively with [facility] and discharged afterward.   She is hemodynamically stable. Labs unremarkable overall, WBC and lactate within normal limits. CT suggestive of SBO, possible colitis, small hiatal hernia.

## 2023-09-29 LAB
ALBUMIN SERPL BCP-MCNC: 3 G/DL (ref 3.5–5.2)
ALP SERPL-CCNC: 107 U/L (ref 55–135)
ALT SERPL W/O P-5'-P-CCNC: 62 U/L (ref 10–44)
ANION GAP SERPL CALC-SCNC: 8 MMOL/L (ref 8–16)
AST SERPL-CCNC: 59 U/L (ref 10–40)
BASOPHILS # BLD AUTO: 0.02 K/UL (ref 0–0.2)
BASOPHILS NFR BLD: 0.4 % (ref 0–1.9)
BILIRUB SERPL-MCNC: 1.5 MG/DL (ref 0.1–1)
BUN SERPL-MCNC: 9 MG/DL (ref 8–23)
CALCIUM SERPL-MCNC: 9.1 MG/DL (ref 8.7–10.5)
CHLORIDE SERPL-SCNC: 101 MMOL/L (ref 95–110)
CO2 SERPL-SCNC: 25 MMOL/L (ref 23–29)
CREAT SERPL-MCNC: 0.8 MG/DL (ref 0.5–1.4)
DIFFERENTIAL METHOD: ABNORMAL
EOSINOPHIL # BLD AUTO: 0.1 K/UL (ref 0–0.5)
EOSINOPHIL NFR BLD: 2.6 % (ref 0–8)
ERYTHROCYTE [DISTWIDTH] IN BLOOD BY AUTOMATED COUNT: 14.4 % (ref 11.5–14.5)
EST. GFR  (NO RACE VARIABLE): >60 ML/MIN/1.73 M^2
GLUCOSE SERPL-MCNC: 134 MG/DL (ref 70–110)
HCT VFR BLD AUTO: 36 % (ref 37–48.5)
HGB BLD-MCNC: 11.9 G/DL (ref 12–16)
IMM GRANULOCYTES # BLD AUTO: 0.01 K/UL (ref 0–0.04)
IMM GRANULOCYTES NFR BLD AUTO: 0.2 % (ref 0–0.5)
LYMPHOCYTES # BLD AUTO: 1 K/UL (ref 1–4.8)
LYMPHOCYTES NFR BLD: 20.2 % (ref 18–48)
MAGNESIUM SERPL-MCNC: 1.3 MG/DL (ref 1.6–2.6)
MCH RBC QN AUTO: 35.7 PG (ref 27–31)
MCHC RBC AUTO-ENTMCNC: 33.1 G/DL (ref 32–36)
MCV RBC AUTO: 108 FL (ref 82–98)
MONOCYTES # BLD AUTO: 0.6 K/UL (ref 0.3–1)
MONOCYTES NFR BLD: 11.4 % (ref 4–15)
NEUTROPHILS # BLD AUTO: 3.3 K/UL (ref 1.8–7.7)
NEUTROPHILS NFR BLD: 65.2 % (ref 38–73)
NRBC BLD-RTO: 0 /100 WBC
PHOSPHATE SERPL-MCNC: 3.4 MG/DL (ref 2.7–4.5)
PLATELET # BLD AUTO: 73 K/UL (ref 150–450)
PMV BLD AUTO: 11.5 FL (ref 9.2–12.9)
POCT GLUCOSE: 140 MG/DL (ref 70–110)
POCT GLUCOSE: 143 MG/DL (ref 70–110)
POTASSIUM SERPL-SCNC: 3.8 MMOL/L (ref 3.5–5.1)
PROT SERPL-MCNC: 6.2 G/DL (ref 6–8.4)
RBC # BLD AUTO: 3.33 M/UL (ref 4–5.4)
SODIUM SERPL-SCNC: 134 MMOL/L (ref 136–145)
WBC # BLD AUTO: 5.01 K/UL (ref 3.9–12.7)

## 2023-09-29 PROCEDURE — 36415 COLL VENOUS BLD VENIPUNCTURE: CPT | Performed by: STUDENT IN AN ORGANIZED HEALTH CARE EDUCATION/TRAINING PROGRAM

## 2023-09-29 PROCEDURE — 63600175 PHARM REV CODE 636 W HCPCS: Performed by: STUDENT IN AN ORGANIZED HEALTH CARE EDUCATION/TRAINING PROGRAM

## 2023-09-29 PROCEDURE — 94761 N-INVAS EAR/PLS OXIMETRY MLT: CPT

## 2023-09-29 PROCEDURE — 25000003 PHARM REV CODE 250: Performed by: STUDENT IN AN ORGANIZED HEALTH CARE EDUCATION/TRAINING PROGRAM

## 2023-09-29 PROCEDURE — 85025 COMPLETE CBC W/AUTO DIFF WBC: CPT | Performed by: STUDENT IN AN ORGANIZED HEALTH CARE EDUCATION/TRAINING PROGRAM

## 2023-09-29 PROCEDURE — 83735 ASSAY OF MAGNESIUM: CPT | Performed by: STUDENT IN AN ORGANIZED HEALTH CARE EDUCATION/TRAINING PROGRAM

## 2023-09-29 PROCEDURE — 80053 COMPREHEN METABOLIC PANEL: CPT | Performed by: STUDENT IN AN ORGANIZED HEALTH CARE EDUCATION/TRAINING PROGRAM

## 2023-09-29 PROCEDURE — 20600001 HC STEP DOWN PRIVATE ROOM

## 2023-09-29 PROCEDURE — 99232 SBSQ HOSP IP/OBS MODERATE 35: CPT | Mod: ,,, | Performed by: STUDENT IN AN ORGANIZED HEALTH CARE EDUCATION/TRAINING PROGRAM

## 2023-09-29 PROCEDURE — 84100 ASSAY OF PHOSPHORUS: CPT | Performed by: STUDENT IN AN ORGANIZED HEALTH CARE EDUCATION/TRAINING PROGRAM

## 2023-09-29 PROCEDURE — 99232 PR SUBSEQUENT HOSPITAL CARE,LEVL II: ICD-10-PCS | Mod: ,,, | Performed by: STUDENT IN AN ORGANIZED HEALTH CARE EDUCATION/TRAINING PROGRAM

## 2023-09-29 PROCEDURE — 25500020 PHARM REV CODE 255

## 2023-09-29 RX ORDER — DIPHENHYDRAMINE HCL 25 MG
25 CAPSULE ORAL EVERY 6 HOURS PRN
Status: DISCONTINUED | OUTPATIENT
Start: 2023-09-29 | End: 2023-10-01 | Stop reason: HOSPADM

## 2023-09-29 RX ORDER — MAGNESIUM SULFATE HEPTAHYDRATE 40 MG/ML
2 INJECTION, SOLUTION INTRAVENOUS ONCE
Status: COMPLETED | OUTPATIENT
Start: 2023-09-29 | End: 2023-09-29

## 2023-09-29 RX ORDER — OXYCODONE HYDROCHLORIDE 5 MG/1
5 TABLET ORAL EVERY 4 HOURS PRN
Status: DISCONTINUED | OUTPATIENT
Start: 2023-09-29 | End: 2023-10-01 | Stop reason: HOSPADM

## 2023-09-29 RX ORDER — LEVOTHYROXINE SODIUM 75 UG/1
75 TABLET ORAL
Status: DISCONTINUED | OUTPATIENT
Start: 2023-09-30 | End: 2023-10-01 | Stop reason: HOSPADM

## 2023-09-29 RX ADMIN — OXYCODONE HYDROCHLORIDE 5 MG: 5 TABLET ORAL at 03:09

## 2023-09-29 RX ADMIN — ENOXAPARIN SODIUM 40 MG: 40 INJECTION SUBCUTANEOUS at 05:09

## 2023-09-29 RX ADMIN — MAGNESIUM SULFATE HEPTAHYDRATE 2 G: 40 INJECTION, SOLUTION INTRAVENOUS at 11:09

## 2023-09-29 RX ADMIN — DIATRIZOATE MEGLUMINE AND DIATRIZOATE SODIUM 100 ML: 660; 100 LIQUID ORAL; RECTAL at 09:09

## 2023-09-29 RX ADMIN — LEVOTHYROXINE SODIUM 25 MCG: 20 INJECTION, SOLUTION INTRAVENOUS at 09:09

## 2023-09-29 NOTE — SUBJECTIVE & OBJECTIVE
Interval History: NAEON. Afebrile. HDS. No ROBF. Denies n/v. NGT low output per report. None recorded in chart, ~150cc in cannister on eval. Pain is controlled with current regimen. Adequate UOP. No new symptoms or concerns this morning. All questions answered.   AM labs pending     Medications:  Continuous Infusions:   lactated ringers 100 mL/hr at 09/28/23 1710     Scheduled Meds:   enoxparin  40 mg Subcutaneous Q24H (prophylaxis, 1700)    levothyroxine  25 mcg Intravenous Daily     PRN Meds:dextrose 10%, glucagon (human recombinant), insulin aspart U-100, LIDOcaine (PF) 10 mg/ml (1%), morphine, ondansetron, sodium chloride 0.9%     Review of patient's allergies indicates:   Allergen Reactions    Codeine Itching and Nausea And Vomiting    Dilaudid [hydromorphone (bulk)] Other (See Comments)     Oversedating, head burning. Pt prefers to avoid.       Percocet [oxycodone-acetaminophen] Itching    Sulfa (sulfonamide antibiotics) Itching and Nausea And Vomiting           Latex, natural rubber Rash     Objective:     Vital Signs (Most Recent):  Temp: 98.2 °F (36.8 °C) (09/29/23 0535)  Pulse: 66 (09/29/23 0535)  Resp: 18 (09/29/23 0535)  BP: (!) 112/56 (09/29/23 0535)  SpO2: 96 % (09/29/23 0535) Vital Signs (24h Range):  Temp:  [97.8 °F (36.6 °C)-99.2 °F (37.3 °C)] 98.2 °F (36.8 °C)  Pulse:  [66-99] 66  Resp:  [13-18] 18  SpO2:  [92 %-96 %] 96 %  BP: ()/(48-59) 112/56     Weight: 61.2 kg (134 lb 14.7 oz)  Body mass index is 24.68 kg/m².    Intake/Output - Last 3 Shifts         09/27 0700 09/28 0659 09/28 0700 09/29 0659 09/29 0700 09/30 0659    I.V. (mL/kg)  900 (14.7)     IV Piggyback 1000      Total Intake(mL/kg) 1000 (16.3) 900 (14.7)     Urine (mL/kg/hr)  600 (0.4)     Drains 125      Stool  0     Total Output 125 600     Net +875 +300            Stool Occurrence  0 x              Physical Exam  Vitals and nursing note reviewed.   Constitutional:       General: She is not in acute distress.     Appearance:  She is not diaphoretic.      Comments: NGT to ELLIOT   HENT:      Head: Normocephalic and atraumatic.      Mouth/Throat:      Mouth: Mucous membranes are moist.      Pharynx: Oropharynx is clear.   Eyes:      Extraocular Movements: Extraocular movements intact.      Conjunctiva/sclera: Conjunctivae normal.   Cardiovascular:      Rate and Rhythm: Normal rate.   Pulmonary:      Effort: Pulmonary effort is normal. No respiratory distress.   Abdominal:      General: There is no distension.      Palpations: Abdomen is soft.      Tenderness: There is no guarding or rebound.      Comments: Soft, not distended. Mild diffuse TTP. No peritonitic signs    Musculoskeletal:         General: No deformity.   Skin:     General: Skin is warm and dry.   Neurological:      Mental Status: She is alert and oriented to person, place, and time.          Significant Labs:  I have reviewed all pertinent lab results within the past 24 hours.    Significant Diagnostics:  I have reviewed all pertinent imaging results/findings within the past 24 hours.

## 2023-09-29 NOTE — PROGRESS NOTES
Matias Johansen - [facility]  General Surgery  Progress Note    Subjective:     History of Present Illness:  Anayeli Judd is a 78 y.o. female with PMHx of DM II, GERD, DVT, multiple prior SBOs s/p ex lap in 2014, 2017, 2022 who presents with abdominal pain. States the pain is diffuse, onset about one day ago. She also c/o nausea, though this has improved with meds in the ED. She last passed BM and flatus yesterday. This symptoms are consistent with her prior admissions for SBO. She otherwise denies other recent changes in her health. She was last admitted for SBO in July 2023, was managed conservatively with GGC and discharged afterward.   She is hemodynamically stable. Labs unremarkable overall, WBC and lactate within normal limits. CT suggestive of SBO, possible colitis, small hiatal hernia.       Post-Op Info:  * No surgery found *         Interval History: NAEON. Afebrile. HDS. No ROBF. Denies n/v. NGT low output per report. None recorded in chart, ~150cc in cannister on eval. Pain is controlled with current regimen. Adequate UOP. No new symptoms or concerns this morning. All questions answered.   AM labs pending     Medications:  Continuous Infusions:   lactated ringers 100 mL/hr at 09/28/23 1710     Scheduled Meds:   enoxparin  40 mg Subcutaneous Q24H (prophylaxis, 1700)    levothyroxine  25 mcg Intravenous Daily     PRN Meds:dextrose 10%, glucagon (human recombinant), insulin aspart U-100, LIDOcaine (PF) 10 mg/ml (1%), morphine, ondansetron, sodium chloride 0.9%     Review of patient's allergies indicates:   Allergen Reactions    Codeine Itching and Nausea And Vomiting    Dilaudid [hydromorphone (bulk)] Other (See Comments)     Oversedating, head burning. Pt prefers to avoid.       Percocet [oxycodone-acetaminophen] Itching    Sulfa (sulfonamide antibiotics) Itching and Nausea And Vomiting           Latex, natural rubber Rash     Objective:     Vital Signs (Most Recent):  Temp: 98.2 °F (36.8 °C) (09/29/23  0535)  Pulse: 66 (09/29/23 0535)  Resp: 18 (09/29/23 0535)  BP: (!) 112/56 (09/29/23 0535)  SpO2: 96 % (09/29/23 0535) Vital Signs (24h Range):  Temp:  [97.8 °F (36.6 °C)-99.2 °F (37.3 °C)] 98.2 °F (36.8 °C)  Pulse:  [66-99] 66  Resp:  [13-18] 18  SpO2:  [92 %-96 %] 96 %  BP: ()/(48-59) 112/56     Weight: 61.2 kg (134 lb 14.7 oz)  Body mass index is 24.68 kg/m².    Intake/Output - Last 3 Shifts         09/27 0700  09/28 0659 09/28 0700 09/29 0659 09/29 0700  09/30 0659    I.V. (mL/kg)  900 (14.7)     IV Piggyback 1000      Total Intake(mL/kg) 1000 (16.3) 900 (14.7)     Urine (mL/kg/hr)  600 (0.4)     Drains 125      Stool  0     Total Output 125 600     Net +875 +300            Stool Occurrence  0 x              Physical Exam  Vitals and nursing note reviewed.   Constitutional:       General: She is not in acute distress.     Appearance: She is not diaphoretic.      Comments: NGT to LIWS   HENT:      Head: Normocephalic and atraumatic.      Mouth/Throat:      Mouth: Mucous membranes are moist.      Pharynx: Oropharynx is clear.   Eyes:      Extraocular Movements: Extraocular movements intact.      Conjunctiva/sclera: Conjunctivae normal.   Cardiovascular:      Rate and Rhythm: Normal rate.   Pulmonary:      Effort: Pulmonary effort is normal. No respiratory distress.   Abdominal:      General: There is no distension.      Palpations: Abdomen is soft.      Tenderness: There is no guarding or rebound.      Comments: Soft, not distended. Mild diffuse TTP. No peritonitic signs    Musculoskeletal:         General: No deformity.   Skin:     General: Skin is warm and dry.   Neurological:      Mental Status: She is alert and oriented to person, place, and time.          Significant Labs:  I have reviewed all pertinent lab results within the past 24 hours.    Significant Diagnostics:  I have reviewed all pertinent imaging results/findings within the past 24 hours.    Assessment/Plan:     * Small bowel obstruction due  to adhesions  Patient is an 78 year old female with PMHx of DM II, GERD, DVT, multiple prior SBOs s/p ex lap in 2014, 2017, 2022 who presents with recurrent SBO. Proceeding with conservative management with bowel rest, NGT decompression, gastograffin challenge. Clinically stable     - NPO  - NGT to LIWS  - Gastrograffin challenge today with KUBs at 4 and 8 hours  - IVF  - PRN pain and nausea medications  - Daily labs  - Replete electrolytes PRN  - Home levothyroxine ordered as IV  - Sliding scale insulin  - DVT prophylaxis (SCDs and lovenox)  - OOB, ambulate    Dispo: not yet medically stable for dc      Type 2 diabetes mellitus without complication, without long-term current use of insulin  - Mostly controlled  - SSI   - POCT glucose checks  - Hypoglycemia protocol  - Holding home meds in setting of acute disease process    Acquired hypothyroidism  - Home synthroid converted to IV while NPO      Case discussed with Dr. Vera.      Canelo Grover, PA-C  General Surgery  [facility]

## 2023-09-29 NOTE — TELEPHONE ENCOUNTER
No care due was identified.  [facility] Embedded Care Due Messages. Reference number: [id number].   9/28/2023 7:25:34 PM CDT

## 2023-09-29 NOTE — PLAN OF CARE
Patient accepted by [facility] via [facility] system.       09/29/23 8324   Post-Acute Status   Post-Acute Authorization Home [facility]   Home Health Status Set-up Complete/Auth obtained

## 2023-09-29 NOTE — PLAN OF CARE
Matias Haily - [facility]      HOME HEALTH ORDERS  FACE TO FACE ENCOUNTER    Patient Name: Anayeli Judd  YOB: 1945    PCP: Darci Guthrie MD   PCP Address: [address] / [place] [address]  PCP Phone Number: [phone]  PCP Fax: [phone]    Encounter Date: 9/27/23    Admit to Home Health    Diagnoses:  Active Hospital Problems    Diagnosis  POA    *Small bowel obstruction due to adhesions [K56.50]  Yes    Type 2 diabetes mellitus without complication, without long-term current use of insulin [E11.9]  Yes     Chronic    Acquired hypothyroidism [E03.9]  Yes     Chronic      Resolved Hospital Problems   No resolved problems to display.       Follow Up Appointments:  No future appointments.    Allergies:  Review of patient's allergies indicates:   Allergen Reactions    Codeine Itching and Nausea And Vomiting    Dilaudid [hydromorphone (bulk)] Other (See Comments)     Oversedating, head burning. Pt prefers to avoid.       Percocet [oxycodone-acetaminophen] Itching    Sulfa (sulfonamide antibiotics) Itching and Nausea And Vomiting           Latex, natural rubber Rash       Medications: Review discharge medications with patient and family and provide education.    Current Facility-Administered Medications   Medication Dose Route Frequency Provider Last Rate Last Admin    dextrose 10% bolus 125 mL 125 mL  12.5 g Intravenous PRN Mickey Mckee MD        enoxaparin injection 40 mg  40 mg Subcutaneous Q24H (prophylaxis, 1700) Canelo Grover PA-C   40 mg at 09/28/23 1715    glucagon (human recombinant) injection 1 mg  1 mg Intramuscular PRN Mickey Mckee MD        insulin aspart U-100 pen 0-5 Units  0-5 Units Subcutaneous Q6H PRN Mickey Mckee MD        lactated ringers infusion   Intravenous Continuous Mickey Mckee  mL/hr at 09/28/23 1710 New Bag at 09/28/23 1710    levothyroxine injection 25 mcg  25 mcg Intravenous Daily Mickey Mckee MD   25 mcg at 09/28/23  1117    LIDOcaine (PF) 10 mg/ml (1%) injection 10 mg  1 mL Intradermal Once PRN Mickey Mckee MD        magnesium sulfate 2g in water 50mL IVPB (premix)  2 g Intravenous Once Amanda Santana MD        morphine injection 2 mg  2 mg Intravenous Q4H PRN Mickey Mckee MD   2 mg at 09/28/23 2232    ondansetron injection 4 mg  4 mg Intravenous Q6H PRN Mickey Mckee MD        sodium chloride 0.9% flush 10 mL  10 mL Intravenous PRN Mickey Mckee MD         Current Discharge Medication List        CONTINUE these medications which have NOT CHANGED    Details   acetaminophen (TYLENOL) 650 MG TbSR Take 1,300 mg by mouth 2 (two) times a day.      ascorbic acid, vitamin C, (VITAMIN C) 1000 MG tablet Take 1,000 mg by mouth once daily.      atorvastatin (LIPITOR) 40 MG tablet Take 40 mg by mouth once daily.      biotin 10,000 mcg TbDL Take 1 tablet by mouth once daily.       calcium-vitamin D3 (OS-KASSANDRA 500 + D3) 500 mg-5 mcg (200 unit) per tablet Take 1 tablet by mouth once daily.      conjugated estrogens (PREMARIN) vaginal cream INSERT 0.5 GRAM VAGINALLY 2 TIMES A WEEK  Qty: 30 g, Refills: 3    Associated Diagnoses: Vaginal atrophy      cranberry extract 200 mg Cap Take 1 capsule by mouth once daily.      donepeziL (ARICEPT) 10 MG tablet TAKE 1 TABLET(10 MG) BY MOUTH EVERY EVENING  Qty: 90 tablet, Refills: 3      DULoxetine (CYMBALTA) 30 MG capsule TAKE 1 CAPSULE BY MOUTH TWICE  DAILY  Qty: 180 capsule, Refills: 3    Comments: Requesting 1 year supply      ferrous sulfate 324 mg (65 mg iron) TbEC Take 1 tablet (324 mg total) by mouth once daily.  Refills: 0      flash glucose scanning reader (FREESTYLE EFREN 14 DAY READER) Misc Check blood glucose level 3 times daily.  Qty: 1 each, Refills: 0    Associated Diagnoses: Type 2 diabetes mellitus without complication, without long-term current use of insulin      flash glucose sensor (FREESTYLE EFREN 14 DAY SENSOR) Kit 1 application by Misc.(Non-Drug; Combo Route)  route every 14 (fourteen) days. Disp 30 or 90 day refill  Qty: 6 kit, Refills: 3    Associated Diagnoses: Type 2 diabetes mellitus without complication, without long-term current use of insulin      gabapentin (NEURONTIN) 300 MG capsule Take 300 mg by mouth 3 (three) times daily.      HYDROcodone-acetaminophen (NORCO) 5-325 mg per tablet Take 1 tablet by mouth every 6 (six) hours as needed for Pain.  Qty: 28 tablet, Refills: 0    Comments: Quantity prescribed more than 7 day supply? No  Associated Diagnoses: Abdominal pain, unspecified abdominal location      hydrocortisone 2.5 % cream Apply topically 2 (two) times daily as needed.  Qty: 1 each, Refills: 1      !! levothyroxine (SYNTHROID) 75 MCG tablet TAKE 1 TABLET(75 MCG) BY MOUTH BEFORE BREAKFAST  Qty: 90 tablet, Refills: 3    Comments: **Patient requests 90 days supply**  Associated Diagnoses: Hypothyroidism, unspecified type      !! levothyroxine (SYNTHROID) 75 MCG tablet Take 1 tablet (75 mcg total) by mouth before breakfast.  Qty: 90 tablet, Refills: 3    Associated Diagnoses: Hypothyroidism, unspecified type      LIDOcaine (LIDODERM) 5 % APPLY 1 TO 3 PATCHES TO BACK DAILY. REMOVE WITHIN 12 HOURS  Qty: 30 patch, Refills: 2    Associated Diagnoses: Back pain, unspecified back location, unspecified back pain laterality, unspecified chronicity      linaGLIPtin (TRADJENTA) 5 mg Tab tablet Take 1 tablet (5 mg total) by mouth once daily.  Qty: 90 tablet, Refills: 3    Associated Diagnoses: Type 2 diabetes mellitus with other specified complication, without long-term current use of insulin      metFORMIN (GLUCOPHAGE) 500 MG tablet Take 1 tablet (500 mg total) by mouth 2 (two) times daily with meals.  Qty: 180 tablet, Refills: 3    Associated Diagnoses: Type 2 diabetes mellitus with other specified complication, without long-term current use of insulin      mometasone (ELOCON) 0.1 % ointment Apply topically 2 (two) times a day. Mix 50/50 with mupirocin ointment.  Apply in each nostril twice daily.  Qty: 15 g, Refills: 1    Associated Diagnoses: Rhinitis, unspecified type; Bleeding nose      montelukast (SINGULAIR) 10 mg tablet TAKE 1 TABLET BY MOUTH  DAILY  Qty: 90 tablet, Refills: 3    Comments: Requesting 1 year supply      ondansetron (ZOFRAN-ODT) 8 MG TbDL Take 1 tablet (8 mg total) by mouth every 8 (eight) hours as needed (nausea).  Qty: 30 tablet, Refills: 0      pantoprazole (PROTONIX) 20 MG tablet Take 1 tablet (20 mg total) by mouth 2 (two) times a day.  Qty: 180 tablet, Refills: 1    Associated Diagnoses: Gastroesophageal reflux disease, unspecified whether esophagitis present      polyethylene glycol (GLYCOLAX) 17 gram PwPk Take 17 g by mouth once daily.  Qty: 90 each, Refills: 3    Comments: Please dispense Polyethylene glycol powder (not Miralax formulation)      traZODone (DESYREL) 50 MG tablet Take 1 tablet (50 mg total) by mouth every evening.  Qty: 90 tablet, Refills: 1    Associated Diagnoses: Insomnia, unspecified type      triamcinolone acetonide 0.1% (KENALOG) 0.1 % paste APPLY TO THE AFFECTED AREA WITH EVERY-TIP THREE TIMES DAILY      ubrogepant (UBRELVY) 100 mg tablet Take 1 tablet daily as needed for migraine headache. May take 1 additional tablet 2 hours later if needed.  Qty: 16 tablet, Refills: 2      valACYclovir (VALTREX) 1000 MG tablet Take 1 tablet (1,000 mg total) by mouth once daily.  Qty: 30 tablet, Refills: 2    Associated Diagnoses: Herpes zoster without complication      vitamin D 1000 units Tab Take 1,000 Units by mouth once daily. D3       !! - Potential duplicate medications found. Please discuss with provider.            I have seen and examined this patient within the last 30 days. My clinical findings that support the need for the home health skilled services and home bound status are the following:no   Weakness/numbness causing balance and gait disturbance due to Weakness/Debility making it taxing to leave home.     Diet:   regular  diet      Referrals/ Consults  Physical Therapy to evaluate and treat. Evaluate for home safety and equipment needs; Establish/upgrade home exercise program. Perform / instruct on therapeutic exercises, gait training, transfer training, and Range of Motion.  Occupational Therapy to evaluate and treat. Evaluate home environment for safety and equipment needs. Perform/Instruct on transfers, ADL training, ROM, and therapeutic exercises.    Activities:   activity as tolerated and ambulate in house     Nursing:   Agency to admit patient within 24 hours of hospital discharge unless specified on physician order or at patient request    SN to complete comprehensive assessment including routine vital signs. Instruct on disease process and s/s of complications to report to MD. Review/verify medication list sent home with the patient at time of discharge  and instruct patient/caregiver as needed. Frequency may be adjusted depending on start of care date.     Skilled nurse to perform up to 3 visits PRN for symptoms related to diagnosis    Notify MD if SBP > 160 or < 90; DBP > 90 or < 50; HR > 120 or < 50; Temp > 101; O2 < 88%    Ok to schedule additional visits based on staff availability and patient request on consecutive days within the home health episode.    When multiple disciplines ordered:    Start of Care occurs on Sunday - Wednesday schedule remaining discipline evaluations as ordered on separate consecutive days following the start of care.    Thursday SOC -schedule subsequent evaluations Friday and Monday the following week.     Friday - Saturday SOC - schedule subsequent discipline evaluations on consecutive days starting Monday of the following week.      Miscellaneous   Routine Skin for Bedridden Patients: Instruct patient/caregiver to apply moisture barrier cream to all skin folds and wet areas in perineal area daily and after baths and all bowel movements.  Diabetic Care:   SN to perform and educate Diabetic  management with blood glucose monitoring:, Fingerstick blood sugar a.m. and p.m., and Report CBG < 60 or > 350 to physician.    Home Health Aide:  Nursing Twice weekly, Physical Therapy Twice weekly, and Occupational Therapy Twice weekly    Please resume any home health orders previous to this admission       I certify that this patient is confined to her home and needs intermittent skilled nursing care, physical therapy, and occupational therapy.

## 2023-09-29 NOTE — ASSESSMENT & PLAN NOTE
Patient is an 78 year old female with PMHx of DM II, GERD, DVT, multiple prior SBOs s/p ex lap in 2014, 2017, 2022 who presents with recurrent SBO. Proceeding with conservative management with bowel rest, NGT decompression, gastograffin challenge. Clinically stable     - NPO  - NGT to LIWS  - Gastrograffin challenge today with KUBs at 4 and 8 hours  - IVF  - PRN pain and nausea medications  - Daily labs  - Replete electrolytes PRN  - Home levothyroxine ordered as IV  - Sliding scale insulin  - DVT prophylaxis (SCDs and lovenox)  - OOB, ambulate    Dispo: not yet medically stable for dc

## 2023-09-29 NOTE — TELEPHONE ENCOUNTER
Refill Routing Note     Refill Routing Note   Medication(s) are not appropriate for processing by [facility] for the following reason(s):      Patient seen in ED/Hospital since LOV with provider    [facility] action(s):  Defer Care Due:  None identified     Medication Therapy Plan: currently admitted to the hospital; deferred to pcp for review      Appointments  past 12m or future 3m with PCP    Date Provider   Last Visit   6/6/2023 Darci Guthrie MD   Next Visit   Visit date not found Darci Guthrie MD   ED visits in past 90 days: 1        Note composed:8:38 AM 09/29/2023

## 2023-09-29 NOTE — ASSESSMENT & PLAN NOTE
- Mostly controlled  - SSI   - POCT glucose checks  - Hypoglycemia protocol  - Holding home meds in setting of acute disease process

## 2023-09-29 NOTE — PLAN OF CARE
[facility] Plan of Care Note  Dx Small Bowel Obstruction due to Adhesions    Shift Events none    Goals of Care: pain management    Neuro: AAO x4    Vital Signs: VSS    Respiratory: RA    Diet: NPO     Is patient tolerating current diet? yes    GTTS: none    Urine Output/Bowel Movement: adequate urine output with purewick    Drains/Tubes/Tube Feeds (include total output/shift): NG tube to LIWS    Lines: pt loss IV's X 2, awaiting Midline      Accuchecks: Q6    Skin: none    Fall Risk Score: 16    Activity level? Up with assist and equipment    Any scheduled procedures? none    Any safety concerns? Fall precautions    Other:  none     Problem: Adult Inpatient Plan of Care  Goal: Plan of Care Review  Outcome: Ongoing, Progressing  Goal: Patient-Specific Goal (Individualized)  Outcome: Ongoing, Progressing  Goal: Absence of Hospital-Acquired Illness or Injury  Outcome: Ongoing, Progressing  Goal: Optimal Comfort and Wellbeing  Outcome: Ongoing, Progressing  Goal: Readiness for Transition of Care  Outcome: Ongoing, Progressing     Problem: Diabetes Comorbidity  Goal: Blood Glucose Level Within Targeted Range  Outcome: Ongoing, Progressing     Problem: Skin Injury Risk Increased  Goal: Skin Health and Integrity  Outcome: Ongoing, Progressing

## 2023-09-29 NOTE — PLAN OF CARE
[facility] Plan of Care Note  Dx SBO    Shift Events Did oral contrast, KUB    Goals of Care: BM    Neuro: A & O x 4    Vital Signs: WDL    Respiratory: WDL    Diet: clears    Is patient tolerating current diet? yes    GTTS: None    Urine Output/Bowel Movement: WDL had several BMs    Drains/Tubes/Tube Feeds (include total output/shift): none    Lines: NONE lost IV that Midline team put in this afternoon    Accuchecks:q6 WDL    Skin: Clear    Fall Risk Score: 16    Activity level? X 1 assist    Any scheduled procedures? none    Any safety concerns? none    Other: none

## 2023-09-30 VITALS
BODY MASS INDEX: 24.68 KG/M2 | TEMPERATURE: 99 F | SYSTOLIC BLOOD PRESSURE: 121 MMHG | DIASTOLIC BLOOD PRESSURE: 59 MMHG | OXYGEN SATURATION: 96 % | WEIGHT: 134.94 LBS | HEART RATE: 64 BPM | RESPIRATION RATE: 18 BRPM

## 2023-09-30 LAB
ALBUMIN SERPL BCP-MCNC: 2.9 G/DL (ref 3.5–5.2)
ALP SERPL-CCNC: 104 U/L (ref 55–135)
ALT SERPL W/O P-5'-P-CCNC: 50 U/L (ref 10–44)
ANION GAP SERPL CALC-SCNC: 10 MMOL/L (ref 8–16)
AST SERPL-CCNC: 42 U/L (ref 10–40)
BASOPHILS # BLD AUTO: 0.03 K/UL (ref 0–0.2)
BASOPHILS NFR BLD: 0.7 % (ref 0–1.9)
BILIRUB SERPL-MCNC: 1.4 MG/DL (ref 0.1–1)
BUN SERPL-MCNC: 8 MG/DL (ref 8–23)
CALCIUM SERPL-MCNC: 8.8 MG/DL (ref 8.7–10.5)
CHLORIDE SERPL-SCNC: 101 MMOL/L (ref 95–110)
CO2 SERPL-SCNC: 25 MMOL/L (ref 23–29)
CREAT SERPL-MCNC: 0.7 MG/DL (ref 0.5–1.4)
DIFFERENTIAL METHOD: ABNORMAL
EOSINOPHIL # BLD AUTO: 0.1 K/UL (ref 0–0.5)
EOSINOPHIL NFR BLD: 3.1 % (ref 0–8)
ERYTHROCYTE [DISTWIDTH] IN BLOOD BY AUTOMATED COUNT: 13.7 % (ref 11.5–14.5)
EST. GFR  (NO RACE VARIABLE): >60 ML/MIN/1.73 M^2
GLUCOSE SERPL-MCNC: 143 MG/DL (ref 70–110)
HCT VFR BLD AUTO: 32.3 % (ref 37–48.5)
HGB BLD-MCNC: 11 G/DL (ref 12–16)
IMM GRANULOCYTES # BLD AUTO: 0.01 K/UL (ref 0–0.04)
IMM GRANULOCYTES NFR BLD AUTO: 0.2 % (ref 0–0.5)
LYMPHOCYTES # BLD AUTO: 1 K/UL (ref 1–4.8)
LYMPHOCYTES NFR BLD: 21.6 % (ref 18–48)
MAGNESIUM SERPL-MCNC: 1.5 MG/DL (ref 1.6–2.6)
MCH RBC QN AUTO: 35.4 PG (ref 27–31)
MCHC RBC AUTO-ENTMCNC: 34.1 G/DL (ref 32–36)
MCV RBC AUTO: 104 FL (ref 82–98)
MONOCYTES # BLD AUTO: 0.6 K/UL (ref 0.3–1)
MONOCYTES NFR BLD: 13.1 % (ref 4–15)
NEUTROPHILS # BLD AUTO: 2.8 K/UL (ref 1.8–7.7)
NEUTROPHILS NFR BLD: 61.3 % (ref 38–73)
NRBC BLD-RTO: 0 /100 WBC
PHOSPHATE SERPL-MCNC: 2.9 MG/DL (ref 2.7–4.5)
PLATELET # BLD AUTO: 72 K/UL (ref 150–450)
PMV BLD AUTO: 11.3 FL (ref 9.2–12.9)
POCT GLUCOSE: 112 MG/DL (ref 70–110)
POCT GLUCOSE: 141 MG/DL (ref 70–110)
POCT GLUCOSE: 336 MG/DL (ref 70–110)
POTASSIUM SERPL-SCNC: 3.3 MMOL/L (ref 3.5–5.1)
PROT SERPL-MCNC: 6.1 G/DL (ref 6–8.4)
RBC # BLD AUTO: 3.11 M/UL (ref 4–5.4)
SODIUM SERPL-SCNC: 136 MMOL/L (ref 136–145)
WBC # BLD AUTO: 4.5 K/UL (ref 3.9–12.7)

## 2023-09-30 PROCEDURE — 36415 COLL VENOUS BLD VENIPUNCTURE: CPT | Performed by: STUDENT IN AN ORGANIZED HEALTH CARE EDUCATION/TRAINING PROGRAM

## 2023-09-30 PROCEDURE — 63600175 PHARM REV CODE 636 W HCPCS: Performed by: STUDENT IN AN ORGANIZED HEALTH CARE EDUCATION/TRAINING PROGRAM

## 2023-09-30 PROCEDURE — 25000003 PHARM REV CODE 250: Performed by: STUDENT IN AN ORGANIZED HEALTH CARE EDUCATION/TRAINING PROGRAM

## 2023-09-30 PROCEDURE — 80053 COMPREHEN METABOLIC PANEL: CPT | Performed by: STUDENT IN AN ORGANIZED HEALTH CARE EDUCATION/TRAINING PROGRAM

## 2023-09-30 PROCEDURE — 83735 ASSAY OF MAGNESIUM: CPT | Performed by: STUDENT IN AN ORGANIZED HEALTH CARE EDUCATION/TRAINING PROGRAM

## 2023-09-30 PROCEDURE — 99238 HOSP IP/OBS DSCHRG MGMT 30/<: CPT | Mod: ,,, | Performed by: STUDENT IN AN ORGANIZED HEALTH CARE EDUCATION/TRAINING PROGRAM

## 2023-09-30 PROCEDURE — 99238 PR HOSPITAL DISCHARGE DAY,<30 MIN: ICD-10-PCS | Mod: ,,, | Performed by: STUDENT IN AN ORGANIZED HEALTH CARE EDUCATION/TRAINING PROGRAM

## 2023-09-30 PROCEDURE — 85025 COMPLETE CBC W/AUTO DIFF WBC: CPT | Performed by: STUDENT IN AN ORGANIZED HEALTH CARE EDUCATION/TRAINING PROGRAM

## 2023-09-30 PROCEDURE — 84100 ASSAY OF PHOSPHORUS: CPT | Performed by: STUDENT IN AN ORGANIZED HEALTH CARE EDUCATION/TRAINING PROGRAM

## 2023-09-30 PROCEDURE — 20600001 HC STEP DOWN PRIVATE ROOM

## 2023-09-30 PROCEDURE — 25000003 PHARM REV CODE 250

## 2023-09-30 RX ORDER — LANOLIN ALCOHOL/MO/W.PET/CERES
400 CREAM (GRAM) TOPICAL ONCE
Status: COMPLETED | OUTPATIENT
Start: 2023-09-30 | End: 2023-09-30

## 2023-09-30 RX ORDER — MAGNESIUM SULFATE HEPTAHYDRATE 40 MG/ML
2 INJECTION, SOLUTION INTRAVENOUS ONCE
Status: DISCONTINUED | OUTPATIENT
Start: 2023-09-30 | End: 2023-09-30

## 2023-09-30 RX ORDER — POTASSIUM CHLORIDE 20 MEQ/1
40 TABLET, EXTENDED RELEASE ORAL ONCE
Status: DISCONTINUED | OUTPATIENT
Start: 2023-09-30 | End: 2023-09-30

## 2023-09-30 RX ADMIN — Medication 400 MG: at 10:09

## 2023-09-30 RX ADMIN — INSULIN ASPART 4 UNITS: 100 INJECTION, SOLUTION INTRAVENOUS; SUBCUTANEOUS at 12:09

## 2023-09-30 RX ADMIN — OXYCODONE HYDROCHLORIDE 5 MG: 5 TABLET ORAL at 02:09

## 2023-09-30 RX ADMIN — LEVOTHYROXINE SODIUM 75 MCG: 75 TABLET ORAL at 05:09

## 2023-09-30 RX ADMIN — POTASSIUM BICARBONATE 40 MEQ: 391 TABLET, EFFERVESCENT ORAL at 10:09

## 2023-09-30 NOTE — HOSPITAL COURSE
Please see daily progress notes for full hospital course. Briefly, the patient presented with symptoms of an SBO. She was managed conservatively and she progressed well and began to have bowel function. She passed a gastrografin challenge. The patients pain was controlled with PO medications.  Ambulating without issue.  Voiding without issue with adequate urine output. Passing gas and stool.  Tolerating diet without nausea or vomiting.Discharged on 9/30/23.

## 2023-09-30 NOTE — SUBJECTIVE & OBJECTIVE
Interval History: Doing well. Passed GGC. Having bowel function. Tolerating CLD.     Medications:  Continuous Infusions:  Scheduled Meds:   enoxparin  40 mg Subcutaneous Q24H (prophylaxis, 1700)    levothyroxine  75 mcg Oral Before breakfast     PRN Meds:dextrose 10%, diphenhydrAMINE, glucagon (human recombinant), insulin aspart U-100, LIDOcaine (PF) 10 mg/ml (1%), ondansetron, oxyCODONE, sodium chloride 0.9%     Review of patient's allergies indicates:   Allergen Reactions    Codeine Itching and Nausea And Vomiting    Dilaudid [hydromorphone (bulk)] Other (See Comments)     Oversedating, head burning. Pt prefers to avoid.       Percocet [oxycodone-acetaminophen] Itching    Sulfa (sulfonamide antibiotics) Itching and Nausea And Vomiting           Latex, natural rubber Rash     Objective:     Vital Signs (Most Recent):  Temp: 98.5 °F (36.9 °C) (09/30/23 0808)  Pulse: 65 (09/30/23 0808)  Resp: 18 (09/30/23 0808)  BP: (!) 116/55 (09/30/23 0808)  SpO2: 95 % (09/30/23 0808) Vital Signs (24h Range):  Temp:  [97.1 °F (36.2 °C)-98.5 °F (36.9 °C)] 98.5 °F (36.9 °C)  Pulse:  [65-74] 65  Resp:  [16-18] 18  SpO2:  [94 %-95 %] 95 %  BP: (110-134)/(55-69) 116/55     Weight: 61.2 kg (134 lb 14.7 oz)  Body mass index is 24.68 kg/m².    Intake/Output - Last 3 Shifts         09/28 0700 09/29 0659 09/29 0700 09/30 0659 09/30 0700  10/01 0659    I.V. (mL/kg) 900 (14.7)      IV Piggyback       Total Intake(mL/kg) 900 (14.7)      Urine (mL/kg/hr) 600 (0.4) 200 (0.1)     Drains       Stool 0 0     Total Output 600 200     Net +300 -200            Stool Occurrence 0 x 3 x              Physical Exam  Vitals and nursing note reviewed.   Constitutional:       General: She is not in acute distress.     Appearance: She is not diaphoretic.   HENT:      Head: Normocephalic and atraumatic.      Mouth/Throat:      Mouth: Mucous membranes are moist.      Pharynx: Oropharynx is clear.   Eyes:      Extraocular Movements: Extraocular movements  intact.      Conjunctiva/sclera: Conjunctivae normal.   Cardiovascular:      Rate and Rhythm: Normal rate.   Pulmonary:      Effort: Pulmonary effort is normal. No respiratory distress.   Abdominal:      General: There is no distension.      Palpations: Abdomen is soft.      Tenderness: There is no guarding or rebound.      Comments: Soft, not distended, non tender   Musculoskeletal:         General: No deformity.   Skin:     General: Skin is warm and dry.   Neurological:      Mental Status: She is alert and oriented to person, place, and time.          Significant Labs:  I have reviewed all pertinent lab results within the past 24 hours.  CBC:   Recent Labs   Lab 09/30/23  0517   WBC 4.50   RBC 3.11*   HGB 11.0*   HCT 32.3*   PLT 72*   *   MCH 35.4*   MCHC 34.1     CMP:   Recent Labs   Lab 09/30/23  0517   *   CALCIUM 8.8   ALBUMIN 2.9*   PROT 6.1      K 3.3*   CO2 25      BUN 8   CREATININE 0.7   ALKPHOS 104   ALT 50*   AST 42*   BILITOT 1.4*       Significant Diagnostics:  I have reviewed all pertinent imaging results/findings within the past 24 hours.

## 2023-09-30 NOTE — PROGRESS NOTES
[facility] - [facility]  General Surgery  Progress Note    Subjective:     History of Present Illness:  Anayeli Judd is a 78 y.o. female with PMHx of DM II, GERD, DVT, multiple prior SBOs s/p ex lap in 2014, 2017, 2022 who presents with abdominal pain. States the pain is diffuse, onset about one day ago. She also c/o nausea, though this has improved with meds in the ED. She last passed BM and flatus yesterday. This symptoms are consistent with her prior admissions for SBO. She otherwise denies other recent changes in her health. She was last admitted for SBO in July 2023, was managed conservatively with GGC and discharged afterward.   She is hemodynamically stable. Labs unremarkable overall, WBC and lactate within normal limits. CT suggestive of SBO, possible colitis, small hiatal hernia.       Post-Op Info:  * No surgery found *         Interval History: Doing well. Passed GGC. Having bowel function. Tolerating CLD.     Medications:  Continuous Infusions:  Scheduled Meds:   enoxparin  40 mg Subcutaneous Q24H (prophylaxis, 1700)    levothyroxine  75 mcg Oral Before breakfast     PRN Meds:dextrose 10%, diphenhydrAMINE, glucagon (human recombinant), insulin aspart U-100, LIDOcaine (PF) 10 mg/ml (1%), ondansetron, oxyCODONE, sodium chloride 0.9%     Review of patient's allergies indicates:   Allergen Reactions    Codeine Itching and Nausea And Vomiting    Dilaudid [hydromorphone (bulk)] Other (See Comments)     Oversedating, head burning. Pt prefers to avoid.       Percocet [oxycodone-acetaminophen] Itching    Sulfa (sulfonamide antibiotics) Itching and Nausea And Vomiting           Latex, natural rubber Rash     Objective:     Vital Signs (Most Recent):  Temp: 98.5 °F (36.9 °C) (09/30/23 0808)  Pulse: 65 (09/30/23 0808)  Resp: 18 (09/30/23 0808)  BP: (!) 116/55 (09/30/23 0808)  SpO2: 95 % (09/30/23 0808) Vital Signs (24h Range):  Temp:  [97.1 °F (36.2 °C)-98.5 °F (36.9 °C)] 98.5 °F (36.9 °C)  Pulse:  [65-74]  [facility] Follow-Up  7/11/2018    Disease and Treatment History:  1. Ill-defined hypercoagulable state who has had numerous embolic and arterial thrombotic events.    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   - Embolic episode with his right carotid artery and had some TIA/stroke-like symptoms: December 2012  - He also had a right renal infarct in October 2013.  -  Embolic MI in July 2015.   - He notes another Renal infarct April but unclear if 2015 or 2016.   ---- He was initially treated with aspirin in 2011 and was transitioned to aspirin plus Coumadin in December 2011.  Plavix was added to the combination in October 2013 and he has remained on those medications minus the aspirin going forward.   2. He was found to have some renal artery stenosis approximately 50% to 60% on a full body angiogram and was seen by Vascular Surgery in October 2015, and they did not feel that invasive interventions were needed and felt like it could cause more harm than good.    3. He has had extensive hypercoagulable workup to date with a factor V and factor II mutation analysis that was negative, protein S and C and antithrombin III levels that were within normal limits, homocysteine that was within normal limits, and lupus anticoagulant and beta-2 glycoprotein assessments that were all negative.  He has had JAK2 testing which was negative and PNH testing which was negative.    4. He was noted to have some mild anemia dropping down into the 11 range.  He had B12 and folate levels that were within normal limits.  He had an EPO level that was 25.  He had a retic count of 2.5% and had a ferritin that was slightly elevated at 350.   -- Bone marrow biopsy on 10/07/2015 and this was read as trilineage hematopoiesis with dysgranulopoiesis and dysmegakaryopoiesis with less than 1% blasts consistent with RCMD 40% to 50% cellular.  Notably, the  "cytogenetics were complex with a karyotype of 45,XY, a derivative of 5 and 17, addition of 9p13, trisomy 11, deletion 111, a minus 17 in 18 cells, and 46,XY in 3.   --IPSS scoring system for which he would get 4 points for cytogenetics, 0 points for blast percentage, 0 points for hemoglobin, 0 points for platelets and 0 points for ANC, giving him a total score of 4, putting him in the intermediate risk category.   5. Consult in BMT clinic 11/2015 for MDS. Siblings typed and 2 sibs are full matches  6. Repeat consult in 2/2017 with stable MDS. Transfer of care here  7. 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 Saw Dr. Craven in 5/2017 and importance of plavix and aspirin stressed and switched to Xarelto.    HPI: HISTORY OF PRESENT ILLNESS:  I last saw Ziggy in 6/2017. Since then he missed appointments here and ended up going down to [place] in 11/2017 for an opinion about his clotting issue and was switched off the plavix and continued on warfarin and prasugrel. Since then no issues. While down there he also was evaluated for his MDS and they repeated a bone marrow biopsy that confirmed the diagnosis as well. He notes he has been feeling well. No fevers or chills, no chest pain or SOB, no nausea or vomiting. No bleeding. Was seen in urgent care of the weekend for some mild issue and had BP that was quite elevated there at 190 over 117. IT was not addressed at the visit but he started taking the norvasc and lisinopril he had at home. No associated headache or visual changes or chest pain.       Medications are updated in Epic         10 point ROS otherwise negative     Physical Examination:    BP (!) 172/105 (BP Location: Right arm, Patient Position: Sitting, Cuff Size: Adult Regular)  Pulse 79  Temp 97.9  F (36.6  C) (Oral)  Resp 16  Ht 1.75 m (5' 8.9\")  Wt 78.2 kg (172 lb 8 oz)  SpO2 97%  BMI 25.55 kg/m2    General:  Looks well. KPS " 65  Resp:  [16-18] 18  SpO2:  [94 %-95 %] 95 %  BP: (110-134)/(55-69) 116/55     Weight: 61.2 kg (134 lb 14.7 oz)  Body mass index is 24.68 kg/m².    Intake/Output - Last 3 Shifts         09/28 0700 09/29 0659 09/29 0700  09/30 0659 09/30 0700  10/01 0659    I.V. (mL/kg) 900 (14.7)      IV Piggyback       Total Intake(mL/kg) 900 (14.7)      Urine (mL/kg/hr) 600 (0.4) 200 (0.1)     Drains       Stool 0 0     Total Output 600 200     Net +300 -200            Stool Occurrence 0 x 3 x              Physical Exam  Vitals and nursing note reviewed.   Constitutional:       General: She is not in acute distress.     Appearance: She is not diaphoretic.   HENT:      Head: Normocephalic and atraumatic.      Mouth/Throat:      Mouth: Mucous membranes are moist.      Pharynx: Oropharynx is clear.   Eyes:      Extraocular Movements: Extraocular movements intact.      Conjunctiva/sclera: Conjunctivae normal.   Cardiovascular:      Rate and Rhythm: Normal rate.   Pulmonary:      Effort: Pulmonary effort is normal. No respiratory distress.   Abdominal:      General: There is no distension.      Palpations: Abdomen is soft.      Tenderness: There is no guarding or rebound.      Comments: Soft, not distended, non tender   Musculoskeletal:         General: No deformity.   Skin:     General: Skin is warm and dry.   Neurological:      Mental Status: She is alert and oriented to person, place, and time.          Significant Labs:  I have reviewed all pertinent lab results within the past 24 hours.  CBC:   Recent Labs   Lab 09/30/23  0517   WBC 4.50   RBC 3.11*   HGB 11.0*   HCT 32.3*   PLT 72*   *   MCH 35.4*   MCHC 34.1     CMP:   Recent Labs   Lab 09/30/23 0517   *   CALCIUM 8.8   ALBUMIN 2.9*   PROT 6.1      K 3.3*   CO2 25      BUN 8   CREATININE 0.7   ALKPHOS 104   ALT 50*   AST 42*   BILITOT 1.4*       Significant Diagnostics:  I have reviewed all pertinent imaging results/findings within the past 24  80-90  HEENT:  No icterus or injection.  Oropharynx is clear, no thrush.   Full exam not done. All of the 15 minute visit spent in direct face to face consultation     Labs:  Results for TOD WHARTON (MRN [id number]) as of 7/11/2018 15:46   Ref. Range 7/11/2018 14:58   Sodium Latest Ref Range: 133 - 144 mmol/L 137   Potassium Latest Ref Range: 3.4 - 5.3 mmol/L 3.9   Chloride Latest Ref Range: 94 - 109 mmol/L 108   Carbon Dioxide Latest Ref Range: 20 - 32 mmol/L 24   Urea Nitrogen Latest Ref Range: 7 - 30 mg/dL 16   Creatinine Latest Ref Range: 0.66 - 1.25 mg/dL 0.78   GFR Estimate Latest Ref Range: >60 mL/min/1.7m2 >90   GFR Estimate If Black Latest Ref Range: >60 mL/min/1.7m2 >90   Calcium Latest Ref Range: 8.5 - 10.1 mg/dL 9.3   Anion Gap Latest Ref Range: 3 - 14 mmol/L 6   Albumin Latest Ref Range: 3.4 - 5.0 g/dL 3.5   Protein Total Latest Ref Range: 6.8 - 8.8 g/dL 8.5   Bilirubin Total Latest Ref Range: 0.2 - 1.3 mg/dL 0.4   Alkaline Phosphatase Latest Ref Range: 40 - 150 U/L 101   ALT Latest Ref Range: 0 - 70 U/L 21   AST Latest Ref Range: 0 - 45 U/L 18   Glucose Latest Ref Range: 70 - 99 mg/dL 86   WBC Latest Ref Range: 4.0 - 11.0 10e9/L 4.8   Hemoglobin Latest Ref Range: 13.3 - 17.7 g/dL 13.0 (L)   Hematocrit Latest Ref Range: 40.0 - 53.0 % 38.6 (L)   Platelet Count Latest Ref Range: 150 - 450 10e9/L 293   RBC Count Latest Ref Range: 4.4 - 5.9 10e12/L 4.01 (L)   MCV Latest Ref Range: 78 - 100 fl 96   MCH Latest Ref Range: 26.5 - 33.0 pg 32.4   MCHC Latest Ref Range: 31.5 - 36.5 g/dL 33.7   RDW Latest Ref Range: 10.0 - 15.0 % 15.1 (H)   Diff Method Unknown Automated Method   % Neutrophils Latest Units: % 48.6   % Lymphocytes Latest Units: % 41.8   % Monocytes Latest Units: % 5.2   % Eosinophils Latest Units: % 2.3   % Basophils Latest Units: % 0.6   % Immature Granulocytes Latest Units: % 1.5   Nucleated RBCs Latest Ref Range: 0 /100 0   Absolute Neutrophil Latest Ref Range: 1.6 - 8.3 10e9/L 2.3   Absolute  hours.    Assessment/Plan:     * Small bowel obstruction due to adhesions  Patient is an 78 year old female with PMHx of DM II, GERD, DVT, multiple prior SBOs s/p ex lap in 2014, 2017, 2022 who presents with recurrent SBO. Proceeding with conservative management with bowel rest, NGT decompression, gastograffin challenge. Clinically stable     - Diabetic diet  - PRN pain and nausea medications  - Daily labs  - Replete electrolytes PRN  - Home meds  - Sliding scale insulin  - DVT prophylaxis (SCDs and lovenox)  - OOB, ambulate    Dispo: Discharge around lunch if tolerating regular diet.      Type 2 diabetes mellitus without complication, without long-term current use of insulin  - Mostly controlled  - SSI   - POCT glucose checks  - Hypoglycemia protocol  - Holding home meds in setting of acute disease process    Acquired hypothyroidism  - Home synthroid converted to IV while NPO        Amanda Santana MD  General Surgery  [facility]   Lymphocytes Latest Ref Range: 0.8 - 5.3 10e9/L 2.0   Absolute Monocytes Latest Ref Range: 0.0 - 1.3 10e9/L 0.3   Absolute Eosinophils Latest Ref Range: 0.0 - 0.7 10e9/L 0.1   Absolute Basophils Latest Ref Range: 0.0 - 0.2 10e9/L 0.0   Abs Immature Granulocytes Latest Ref Range: 0 - 0.4 10e9/L 0.1   Absolute Nucleated RBC Unknown 0.0     Assessment and Plan:  Mr. Hallman is a 42-year-old gentleman with an extensive clotting history, on anticoagulation currently with no identified specific abnormality, diagnosed with myelodysplastic syndrome with complex karyotype.      1.  Myelodysplastic syndrome. Counts stable. Currently monitoring. No indication for treatment or transplant at this time. Repeat evaluation at [facility] in 11/2017 and no sign of progression or change        2.  Coagulopathy: Previously followed by Dr. Payne at [facility] but has not had follow up since she left. Had a consult at [facility] and found that plavix wasn't working so on a new regimen and doing well    3. ID: No active issues    4. CV: severe HTN today. Was higher this past weekend when he wasn't taking any of his BP meds. Started taking norvasc and lisinopril but doesn't know the dose. No symptoms associated with the HTN and it is better than this past weekend. Asked that he check the doses and touch base with us. Also asked that he get a BP machine at home to monitor and see his primary doc in 1 week.    Plan:  - [place] and labs in 3 months    Olive Valverde

## 2023-09-30 NOTE — NURSING
Pt tolerated her diet, having BMs. Denied ABD pain, having some cramping pain on her right leg. Discharge instructions given to the pt. No questions at this time

## 2023-09-30 NOTE — ASSESSMENT & PLAN NOTE
Patient is an 78 year old female with PMHx of DM II, GERD, DVT, multiple prior SBOs s/p ex lap in 2014, 2017, 2022 who presents with recurrent SBO. Proceeding with conservative management with bowel rest, NGT decompression, gastograffin challenge. Clinically stable     - Diabetic diet  - PRN pain and nausea medications  - Daily labs  - Replete electrolytes PRN  - Home meds  - Sliding scale insulin  - DVT prophylaxis (SCDs and lovenox)  - OOB, ambulate    Dispo: Discharge around lunch if tolerating regular diet.

## 2023-09-30 NOTE — PLAN OF CARE
Matias Johansen [facility]  Discharge Final Note    Primary Care Provider: Darci Guthrie MD    Expected Discharge Date: 9/30/2023    CM noted discharge order and reviewed chart. Plan is discharge home with [facility], for resumption of care. Notified  on-call nurse of discharge. Patient is ready to discharge from case management standpoint.    Final Discharge Note (most recent)       Final Note - 09/29/23 1550          Post-Acute Status    Post-Acute Authorization Home [facility]     Home Health Status Set-up Complete/Auth obtained                   Important Message from Medicare         Contact Info       [facility]   Specialty: Home Health Services    [address]  [address]  [place] [address]   Phone: [phone]       Next Steps: Follow up    Instructions: Home Health    Steve Vera MD   Specialty: General Surgery    [address]  [place] [address]   Phone: [phone]       Next Steps: Follow up    Instructions: As needed          Danae Truong RN  [facility]  - [facility] [place]  Spectralink: [phone]

## 2023-09-30 NOTE — DISCHARGE SUMMARY
Matias fernie [facility]  General Surgery  Discharge Summary      Patient Name: Anayeli Judd  MRN: [id number]  Admission Date: 9/27/2023  Hospital Length of Stay: 2 days  Discharge Date and Time:  09/30/2023 12:50 PM  Attending Physician: Rafaela Vera MD   Discharging Provider: ERIKA Fan MD  Primary Care Provider: Darci Guthrie MD    HPI:   Anayeli Judd is a 78 y.o. female with PMHx of DM II, GERD, DVT, multiple prior SBOs s/p ex lap in 2014, 2017, 2022 who presents with abdominal pain. States the pain is diffuse, onset about one day ago. She also c/o nausea, though this has improved with meds in the ED. She last passed BM and flatus yesterday. This symptoms are consistent with her prior admissions for SBO. She otherwise denies other recent changes in her health. She was last admitted for SBO in July 2023, was managed conservatively with GGC and discharged afterward.   She is hemodynamically stable. Labs unremarkable overall, WBC and lactate within normal limits. CT suggestive of SBO, possible colitis, small hiatal hernia.       * No surgery found *      Indwelling Lines/Drains at time of discharge:   Lines/Drains/Airways     None               Hospital Course: Please see daily progress notes for full hospital course. Briefly, the patient presented with symptoms of an SBO. She was managed conservatively and she progressed well and began to have bowel function. She passed a gastrografin challenge. The patients pain was controlled with PO medications.  Ambulating without issue.  Voiding without issue with adequate urine output. Passing gas and stool.  Tolerating diet without nausea or vomiting.Discharged on 9/30/23.        Goals of Care Treatment Preferences:  Code Status: Full Code    Living Will: Yes              Consults:   Consults (From admission, onward)        Status Ordering Provider     Inpatient consult to Midline team  Once        Provider:  (Not yet assigned)    Completed RAFAELA VERA      Inpatient consult to General surgery  Once        Provider:  (Not yet assigned)    Completed DU MANTILLA          Pending Diagnostic Studies:     None        Final Active Diagnoses:    Diagnosis Date Noted POA    PRINCIPAL PROBLEM:  Small bowel obstruction due to adhesions [K56.50] 05/14/2021 Yes    Type 2 diabetes mellitus without complication, without long-term current use of insulin [E11.9] 05/05/2015 Yes     Chronic    Acquired hypothyroidism [E03.9] 01/11/2013 Yes     Chronic      Problems Resolved During this Admission:      Discharged Condition: stable    Disposition: Home or Self Care    Follow Up:   Follow-up Information     [place] Follow up.    Specialty: Home Health Services  Why: Home Health  Contact information:  [address]  [address]  [facility] [address]  [phone]             Steve Vera MD Follow up.    Specialty: General Surgery  Why: As needed  Contact information:  [address]  [facility] [address] [phone]                       Patient Instructions:      No dressing needed     Notify your health care provider if you experience any of the following:  temperature >100.4     Notify your health care provider if you experience any of the following:  persistent nausea and vomiting or diarrhea     Notify your health care provider if you experience any of the following:  severe uncontrolled pain     Notify your health care provider if you experience any of the following:  redness, tenderness, or signs of infection (pain, swelling, redness, odor or green/yellow discharge around incision site)     Notify your health care provider if you experience any of the following:  difficulty breathing or increased cough     Notify your health care provider if you experience any of the following:  severe persistent headache     Notify your health care provider if you experience any of the following:  worsening rash     Notify your health care  provider if you experience any of the following:  persistent dizziness, light-headedness, or visual disturbances     Notify your health care provider if you experience any of the following:  increased confusion or weakness     Activity as tolerated     Medications:  Reconciled Home Medications:      Medication List      CONTINUE taking these medications    acetaminophen 650 MG Tbsr  Commonly known as: TYLENOL  Take 1,300 mg by mouth 2 (two) times a day.     ascorbic acid (vitamin C) 1000 MG tablet  Commonly known as: VITAMIN C  Take 1,000 mg by mouth once daily.     atorvastatin 40 MG tablet  Commonly known as: LIPITOR  Take 40 mg by mouth once daily.     biotin 10,000 mcg Tbdl  Take 1 tablet by mouth once daily.     calcium-vitamin D3 500 mg-5 mcg (200 unit) per tablet  Commonly known as: OS-KASSANDRA 500 + D3  Take 1 tablet by mouth once daily.     cranberry extract 200 mg Cap  Take 1 capsule by mouth once daily.     donepeziL 10 MG tablet  Commonly known as: ARICEPT  TAKE 1 TABLET(10 MG) BY MOUTH EVERY EVENING     DULoxetine 30 MG capsule  Commonly known as: CYMBALTA  TAKE 1 CAPSULE BY MOUTH TWICE  DAILY     ferrous sulfate 324 mg (65 mg iron) Tbec  Take 1 tablet (324 mg total) by mouth once daily.     FREESTYLE EFREN 14 DAY READER Misc  Generic drug: flash glucose scanning reader  Check blood glucose level 3 times daily.     FREESTYLE EFREN 14 DAY SENSOR Kit  Generic drug: flash glucose sensor  1 application by Misc.(Non-Drug; Combo Route) route every 14 (fourteen) days. Disp 30 or 90 day refill     gabapentin 300 MG capsule  Commonly known as: NEURONTIN  Take 300 mg by mouth 3 (three) times daily.     HYDROcodone-acetaminophen 5-325 mg per tablet  Commonly known as: NORCO  Take 1 tablet by mouth every 6 (six) hours as needed for Pain.     * levothyroxine 75 MCG tablet  Commonly known as: SYNTHROID  TAKE 1 TABLET(75 MCG) BY MOUTH BEFORE BREAKFAST     LIDOcaine 5 %  Commonly known as: LIDODERM  APPLY 1 TO 3 PATCHES TO  BACK DAILY. REMOVE WITHIN 12 HOURS     linaGLIPtin 5 mg Tab tablet  Commonly known as: TRADJENTA  Take 1 tablet (5 mg total) by mouth once daily.     metFORMIN 500 MG tablet  Commonly known as: GLUCOPHAGE  Take 1 tablet (500 mg total) by mouth 2 (two) times daily with meals.     mometasone 0.1 % ointment  Commonly known as: ELOCON  Apply topically 2 (two) times a day. Mix 50/50 with mupirocin ointment. Apply in each nostril twice daily.     montelukast 10 mg tablet  Commonly known as: SINGULAIR  TAKE 1 TABLET BY MOUTH  DAILY     pantoprazole 20 MG tablet  Commonly known as: PROTONIX  Take 1 tablet (20 mg total) by mouth 2 (two) times a day.     polyethylene glycol 17 gram Pwpk  Commonly known as: GLYCOLAX  Take 17 g by mouth once daily.     PREMARIN vaginal cream  Generic drug: conjugated estrogens  INSERT 0.5 GRAM VAGINALLY 2 TIMES A WEEK     traZODone 50 MG tablet  Commonly known as: DESYREL  Take 1 tablet (50 mg total) by mouth every evening.     triamcinolone acetonide 0.1% 0.1 % paste  Commonly known as: KENALOG  APPLY TO THE AFFECTED AREA WITH EVERY-TIP THREE TIMES DAILY     valACYclovir 1000 MG tablet  Commonly known as: VALTREX  Take 1 tablet (1,000 mg total) by mouth once daily.     vitamin D 1000 units Tab  Commonly known as: VITAMIN D3  Take 1,000 Units by mouth once daily. D3         * This list has 1 medication(s) that are the same as other medications prescribed for you. Read the directions carefully, and ask your doctor or other care provider to review them with you.            ASK your doctor about these medications    hydrocortisone 2.5 % cream  Apply topically 2 (two) times daily as needed.     * levothyroxine 75 MCG tablet  Commonly known as: SYNTHROID  Take 1 tablet (75 mcg total) by mouth before breakfast.     ondansetron 8 MG Tbdl  Commonly known as: ZOFRAN-ODT  Take 1 tablet (8 mg total) by mouth every 8 (eight) hours as needed (nausea).     UBRELVY 100 mg tablet  Generic drug:  ubrogepant  Take 1 tablet daily as needed for migraine headache. May take 1 additional tablet 2 hours later if needed.         * This list has 1 medication(s) that are the same as other medications prescribed for you. Read the directions carefully, and ask your doctor or other care provider to review them with you.              Time spent on the discharge of patient: 5 minutes    ERIKA Fan MD  General Surgery  [facility]

## 2023-10-01 PROCEDURE — G0180 PR HOME HEALTH MD CERTIFICATION: ICD-10-PCS | Mod: ,,, | Performed by: STUDENT IN AN ORGANIZED HEALTH CARE EDUCATION/TRAINING PROGRAM

## 2023-10-01 PROCEDURE — G0180 MD CERTIFICATION HHA PATIENT: HCPCS | Mod: ,,, | Performed by: STUDENT IN AN ORGANIZED HEALTH CARE EDUCATION/TRAINING PROGRAM

## 2023-10-02 ENCOUNTER — PATIENT MESSAGE (OUTPATIENT)
Dept: ADMINISTRATIVE | Facility: CLINIC | Age: 78
End: 2023-10-02
Payer: MEDICARE

## 2023-10-02 ENCOUNTER — PATIENT OUTREACH (OUTPATIENT)
Dept: ADMINISTRATIVE | Facility: CLINIC | Age: 78
End: 2023-10-02
Payer: MEDICARE

## 2023-10-02 RX ORDER — PANTOPRAZOLE SODIUM 20 MG/1
20 TABLET, DELAYED RELEASE ORAL 2 TIMES DAILY
Qty: 180 TABLET | Refills: 1 | Status: SHIPPED | OUTPATIENT
Start: 2023-10-02 | End: 2024-02-23

## 2023-10-02 NOTE — PROGRESS NOTES
Daughter returned call in TCM box leaving pt's phone number to contact. Left voicemail for pt with return number.

## 2023-10-02 NOTE — PROGRESS NOTES
C3 nurse spoke with Anayeli Judd for a TCC post hospital discharge follow up call. The patient does not have a scheduled HOSFU appointment with Darci Guthrie MD within 5-7 days post hospital discharge date 9/30/23. C3 nurse was unable to schedule HOSFU appointment in [facility].    Message sent to PCP staff requesting they contact patient and schedule follow up appointment.

## 2023-10-04 ENCOUNTER — TELEPHONE (OUTPATIENT)
Dept: HOME HEALTH SERVICES | Facility: CLINIC | Age: 78
End: 2023-10-04
Payer: MEDICARE

## 2023-10-04 DIAGNOSIS — M47.22 CERVICAL SPONDYLOSIS WITH RADICULOPATHY: ICD-10-CM

## 2023-10-04 DIAGNOSIS — M47.816 LUMBAR SPONDYLOSIS: Primary | ICD-10-CM

## 2023-10-05 ENCOUNTER — PES CALL (OUTPATIENT)
Dept: HOME HEALTH SERVICES | Facility: CLINIC | Age: 78
End: 2023-10-05
Payer: MEDICARE

## 2023-10-12 NOTE — TELEPHONE ENCOUNTER
Pt and family and I have direct means of contact. Recurrent SBO has been a known hx and not unexpected unfortunately. Pt prefers a relatively conservative approach and coming into clinic preferred only if pressing need. I have reviewed her recent stay and discharge and will remain available to her and family. No pressing need for urgently coming into clinic physically at this time.

## 2023-10-16 DIAGNOSIS — K21.9 GASTROESOPHAGEAL REFLUX DISEASE, UNSPECIFIED WHETHER ESOPHAGITIS PRESENT: Chronic | ICD-10-CM

## 2023-10-16 RX ORDER — PANTOPRAZOLE SODIUM 20 MG/1
20 TABLET, DELAYED RELEASE ORAL
Qty: 90 TABLET | OUTPATIENT
Start: 2023-10-16

## 2023-10-16 NOTE — TELEPHONE ENCOUNTER
Refill Request Routed to the [facility] Review Pool for the pharmacist to review.

## 2023-10-16 NOTE — TELEPHONE ENCOUNTER
No care due was identified.  [facility] Embedded Care Due Messages. Reference number: [id number].   10/16/2023 11:37:00 AM CDT

## 2023-10-17 ENCOUNTER — CARE AT HOME (OUTPATIENT)
Dept: HOME HEALTH SERVICES | Facility: CLINIC | Age: 78
End: 2023-10-17
Payer: MEDICARE

## 2023-10-17 VITALS
RESPIRATION RATE: 18 BRPM | SYSTOLIC BLOOD PRESSURE: 127 MMHG | OXYGEN SATURATION: 97 % | TEMPERATURE: 98 F | HEART RATE: 66 BPM | DIASTOLIC BLOOD PRESSURE: 65 MMHG

## 2023-10-17 DIAGNOSIS — J43.9 PULMONARY EMPHYSEMA, UNSPECIFIED EMPHYSEMA TYPE: ICD-10-CM

## 2023-10-17 DIAGNOSIS — G20.C PARKINSONISM, UNSPECIFIED PARKINSONISM TYPE: Primary | ICD-10-CM

## 2023-10-17 DIAGNOSIS — C18.9 MALIGNANT NEOPLASM OF COLON, UNSPECIFIED PART OF COLON: ICD-10-CM

## 2023-10-17 DIAGNOSIS — M47.22 CERVICAL SPONDYLOSIS WITH RADICULOPATHY: ICD-10-CM

## 2023-10-17 DIAGNOSIS — M47.816 LUMBAR SPONDYLOSIS: ICD-10-CM

## 2023-10-17 PROCEDURE — 99350 HOME/RES VST EST HIGH MDM 60: CPT | Mod: S$GLB,,, | Performed by: NURSE PRACTITIONER

## 2023-10-17 PROCEDURE — 99497 ADVNCD CARE PLAN 30 MIN: CPT | Mod: S$GLB,,, | Performed by: NURSE PRACTITIONER

## 2023-10-17 PROCEDURE — 99497 PR ADVNCD CARE PLAN 30 MIN: ICD-10-PCS | Mod: S$GLB,,, | Performed by: NURSE PRACTITIONER

## 2023-10-17 PROCEDURE — 99350 PR HOME VISIT,ESTAB PATIENT,LEVEL IV: ICD-10-PCS | Mod: S$GLB,,, | Performed by: NURSE PRACTITIONER

## 2023-10-17 NOTE — PATIENT INSTRUCTIONS
Instructions:  - [facility] Nurse Practitioner to schedule home follow-up visit with patient in 4-6 weeks or as needed.  - Continue all medications, treatments and therapies as ordered.   - Follow all instructions, recommendations as discussed.  - Maintain Safety Precautions at all times.  - Attend all medical appointments as scheduled.  - For worsening symptoms: call Primary Care Physician or Nurse Practitioner.  - For emergencies, call 911 or immediately report to the nearest emergency room.  - Limit Risks of environmental exposure to coronavirus/COVID-19 as discussed including: social distancing, hand hygiene, and limiting departures from the home for necessities only.

## 2023-10-17 NOTE — PROGRESS NOTES
"[facility]  Medical Care Home Visit    Visit Date: 10/17/23  Encounter Provider: Elissa Canseco NP  PCP:  Darci Guthrie MD    PRESENTING HISTORY      Patient ID: Anayeli Judd 78 y.o. female.    Consult Requested By:  Winsome Clark  Reason for Consult:  Low grade fever, weakness     Chief Complaint: Low grade fever, weakness    The patient is being seen at home due to a physical debility that presents a taxing effort to leave the home, to mitigate high risk of hospital readmission or due to the limited availability of reliable or safe options for transportation to the point of access to the provider. The visit meets the criteria for medical necessity as defined by CMS as "health-care services needed to prevent, diagnose, or treat an illness, injury, condition, disease, or its symptoms and that meet accepted standards of medicine." Prior to treatment on this visit the chart was reviewed and patient consent was obtained.    HPI: Anayeli Judd is an 78 y.o. year old female hx of DM, colon cancer(s/p transverse colectomy), UE DVT(no blood thinners), recurrent episodes of abdominal pain. She follow with GI Dr. Jefferson, PCP Dr. Guthrie, Neurology and Hematology.     Today:  Ms. Anayeli Judd is a 78 y.o. female is being seen and examined at home for to reestablish home based care.  Dr. Guthrie is her PCP.  She was recently hospitalization from [id number] with a small bowel obstructions. She denies abdominal pain, chest pain, chills, nausea, cough, congestion, change in bladder habits, change in bowel habits.   She reports taking meds as prescribed.  She also reports associated weakness. Her appetite is decreased since her hospitalization, BM pattern wnl, sleep pattern wnl. She has private caregivers in home that assist with ADLs.  She is aware of upcoming scheduled appointments. No other needs identified at this time. Risks of environmental exposure to coronavirus discussed including: social " distancing, hand hygiene, and limiting departures from the home for necessities only.  Reports understanding and willingness to comply.     Attestation: Screening criteria to assess the level of the patient's risk for infection with COVID-19 as recommended by the CDC at the time of the above documented home visit concluded appropriateness to proceed. Universal precautions were maintained at all times, including provider use of >60% alcohol gel hand  immediately prior to entry and upon departing the patient's home as well as cleaning of equipment used in home visit with antibacterial/germicidal disposable wipes.      ________________________________________________________________    Review of Systems   Constitutional:  Negative for chills and fever (low grade).   HENT:  Negative for congestion.    Eyes:  Negative for visual disturbance.   Respiratory:  Negative for chest tightness and shortness of breath.    Cardiovascular:  Negative for chest pain, palpitations and leg swelling.   Gastrointestinal:  Negative for abdominal pain, constipation, diarrhea, nausea and vomiting.   Genitourinary:  Negative for difficulty urinating.   Musculoskeletal:  Negative for arthralgias and myalgias.   Skin:  Negative for wound.   Neurological:  Positive for weakness. Negative for dizziness.   Hematological:  Does not bruise/bleed easily.   Psychiatric/Behavioral:  Negative for agitation.      Assessments:  Environmental: 7th floor condo, no steps to enter, adequate lighting and temperature control  Functional Status: Assistance with ADL's/IADL's, ambulates with assistance of a cane/walker, continent of bowel and bladder  Safety: Fall Precautions, COVID Precautions/Social Distancing/Mask Use  Nutritional: Adequate  Home Health: n/a  DME/Supplies: RW      PAST HISTORY:     Past Medical History:   Diagnosis Date    Abdominal pain     Allergic rhinitis     Arthritis     Blood platelet disorder     Blood platelet disorder      Blood transfusion     during delivery and     Bowel obstruction     Cervical radiculopathy     followed by dr cloud    Colon cancer     transverse colon; resected; Stage IIA (pT3 pN0 MX)    Degenerative joint disease (DJD) of lumbar spine     Diabetes mellitus     Diarrhea     Falls 2020    Family history of breast cancer     Family history of colon cancer     Fatty liver     GERD (gastroesophageal reflux disease)     History of shingles     Hyperlipidemia     Hypomagnesemia     Hypothyroidism     Irritable bowel syndrome     Microscopic colitis     treated     Migraine     Myelodysplastic syndrome     Raynaud phenomenon     Raynaud's disease     Squamous cell carcinoma of skin     Type 2 diabetes mellitus     Usual hyperplasia of lactiferous duct        Past Surgical History:   Procedure Laterality Date    ADENOIDECTOMY      APPENDECTOMY      BACK SURGERY      BREAST BIOPSY  1970    BREAST CYST EXCISION  1970?    BREAST LUMPECTOMY  1970    CARPAL TUNNEL RELEASE      bilateral      SECTION      CHOLECYSTECTOMY  1965    COLECTOMY  2011    Transverse colon resection by Dr. Aguirre    COLONOSCOPY N/A 2017    Procedure: COLONOSCOPY;  Surgeon: Manjit Alvarez MD;  Location: [facility] (4TH FLR);  Service: Endoscopy;  Laterality: N/A;    COLONOSCOPY N/A 2019    Procedure: COLONOSCOPY;  Surgeon: Ramiro Jefferson MD;  Location: [facility] (4TH FLR);  Service: Endoscopy;  Laterality: N/A;  PM prep    COLONOSCOPY N/A 2022    Procedure: COLONOSCOPY;  Surgeon: Ramiro Jefferson MD;  Location: [facility] (2ND FLR);  Service: Endoscopy;  Laterality: N/A;  EGD and colonoscopy in 6-8 weeks from now     states has constipation. -extended Golytely prep    CYSTOSCOPY N/A 2021    Procedure: CYSTOSCOPY;  Surgeon: Viridiana Valenzuela MD;  [address]  Service: Urology;  Laterality: N/A;    ENDOSCOPIC ULTRASOUND OF UPPER GASTROINTESTINAL TRACT N/A 2018     Procedure: ULTRASOUND, UPPER GI TRACT, ENDOSCOPIC;  Surgeon: Jose Hess MD;  Location: [facility] ENDO;  Service: Endoscopy;  Laterality: N/A;    ENDOSCOPIC ULTRASOUND OF UPPER GASTROINTESTINAL TRACT N/A 01/23/2019    Procedure: ULTRASOUND, UPPER GI TRACT, ENDOSCOPIC;  Surgeon: Jose Hess MD;  Location: [facility] (2ND FLR);  Service: Endoscopy;  Laterality: N/A;    ESOPHAGOGASTRODUODENOSCOPY N/A 11/16/2018    Procedure: EGD (ESOPHAGOGASTRODUODENOSCOPY);  Surgeon: Angelo Reynolds MD;  Location: [facility] (2ND FLR);  Service: Endoscopy;  Laterality: N/A;    ESOPHAGOGASTRODUODENOSCOPY N/A 06/26/2019    Procedure: EGD (ESOPHAGOGASTRODUODENOSCOPY);  Surgeon: Ramiro Jefferson MD;  Location: [facility] (4TH FLR);  Service: Endoscopy;  Laterality: N/A;    ESOPHAGOGASTRODUODENOSCOPY N/A 05/04/2022    Procedure: EGD (ESOPHAGOGASTRODUODENOSCOPY);  Surgeon: Ramiro Jefferson MD;  Location: [facility] (2ND FLR);  Service: Endoscopy;  Laterality: N/A;  fully vaccinated-GT    EYE SURGERY      Cataract Removal    FLUOROSCOPIC URODYNAMIC STUDY N/A 11/09/2021    Procedure: URODYNAMIC STUDY, FLUOROSCOPIC;  Surgeon: Viridiana Valenzuela MD;  Location: [facility] OR 1ST FLR;  Service: Urology;  Laterality: N/A;  90 minutes     HYSTERECTOMY      JOINT REPLACEMENT      OOPHORECTOMY  1970    posterolateral fusion with autograft bone and [place] mineralized bone matrix  02/01/2013    at [facility] for lumbar spine stenosis    SMALL INTESTINE SURGERY      SPINE SURGERY      TOE SURGERY      TONSILLECTOMY      TRANSFORAMINAL EPIDURAL INJECTION OF STEROID Bilateral 12/21/2022    Procedure: Injection,steroid,epidural, Todd L5/S1 TF CONTRAST DIRECT REFERRAL;  Surgeon: Toni Osorio MD;  Location: [facility] PAIN MGT;  Service: Pain Management;  Laterality: Bilateral;    TRIGGER FINGER RELEASE         Family History   Problem Relation Age of Onset    Heart disease Father 50        Mi age 50    Colon cancer Father     Bladder Cancer Mother         non smoker     Cataracts Mother     Glaucoma Mother     Heart disease Mother     Hyperlipidemia Mother     Kidney disease Mother     Breast cancer Sister 79    Arthritis Daughter     Asthma Daughter     Depression Daughter     Hypertension Daughter     Stroke Daughter 40    Arthritis Brother     Colon cancer Brother 70    Alcohol abuse Brother     Parkinsonism Brother     Alcohol abuse Brother     Depression Daughter     Stroke Daughter     Alcohol abuse Brother     Arthritis Brother     Asthma Daughter     Depression Daughter     Celiac disease Neg Hx     Cirrhosis Neg Hx     Colon polyps Neg Hx     Crohn's disease Neg Hx     Cystic fibrosis Neg Hx     Esophageal cancer Neg Hx     Hemochromatosis Neg Hx     Inflammatory bowel disease Neg Hx     Irritable bowel syndrome Neg Hx     Liver cancer Neg Hx     Liver disease Neg Hx     Rectal cancer Neg Hx     Stomach cancer Neg Hx     Ulcerative colitis Neg Hx     Eliazar's disease Neg Hx     Amblyopia Neg Hx     Blindness Neg Hx     Macular degeneration Neg Hx     Retinal detachment Neg Hx     Strabismus Neg Hx     Melanoma Neg Hx        Social History     Socioeconomic History    Marital status:    Tobacco Use    Smoking status: Never    Smokeless tobacco: Never   Substance and Sexual Activity    Alcohol use: Not Currently     Comment: socially, rarely    Drug use: Never    Sexual activity: Not Currently   Social History Narrative    , lives alone.  Primary support are her children and friends.     Social Determinants of Health     Financial Resource Strain: Low Risk  (9/28/2023)    Overall Financial Resource Strain (CARDIA)     Difficulty of Paying Living Expenses: Not hard at all   Food Insecurity: No Food Insecurity (9/28/2023)    Hunger Vital Sign     Worried About Running Out of Food in the Last Year: Never true     Ran Out of Food in the Last Year: Never true   Transportation Needs: No Transportation Needs (9/28/2023)    PRAPARE - Transportation     Lack of  Transportation (Medical): No     Lack of Transportation (Non-Medical): No   Physical Activity: Inactive (9/28/2023)    Exercise Vital Sign     Days of Exercise per Week: 0 days     Minutes of Exercise per Session: 0 min   Stress: No Stress Concern Present (7/8/2023)    [employer] [place] [employer] - Occupational Stress Questionnaire     Feeling of Stress : Not at all   Social Connections: Unknown (9/28/2023)    Social Connection and Isolation Panel [NHANES]     Frequency of Communication with Friends and Family: More than three times a week     Frequency of Social Gatherings with Friends and Family: More than three times a week     Attends Congregation Services: Never     Active Member of Clubs or Organizations: No     Attends Club or Organization Meetings: Never     Marital Status: Patient refused   Housing Stability: Low Risk  (9/28/2023)    Housing Stability Vital Sign     Unable to Pay for Housing in the Last Year: No     Number of Places Lived in the Last Year: 1     Unstable Housing in the Last Year: No       MEDICATIONS & ALLERGIES:     Current Outpatient Medications on File Prior to Visit   Medication Sig Dispense Refill    acetaminophen (TYLENOL) 650 MG TbSR Take 1,300 mg by mouth 2 (two) times a day.      ascorbic acid, vitamin C, (VITAMIN C) 1000 MG tablet Take 1,000 mg by mouth once daily.      atorvastatin (LIPITOR) 40 MG tablet Take 40 mg by mouth once daily.      biotin 10,000 mcg TbDL Take 1 tablet by mouth once daily.       calcium-vitamin D3 (OS-KASSANDRA 500 + D3) 500 mg-5 mcg (200 unit) per tablet Take 1 tablet by mouth once daily.      conjugated estrogens (PREMARIN) vaginal cream INSERT 0.5 GRAM VAGINALLY 2 TIMES A WEEK 30 g 3    cranberry extract 200 mg Cap Take 1 capsule by mouth once daily.      donepeziL (ARICEPT) 10 MG tablet TAKE 1 TABLET(10 MG) BY MOUTH EVERY EVENING 90 tablet 3    DULoxetine (CYMBALTA) 30 MG capsule TAKE 1 CAPSULE BY MOUTH TWICE  DAILY 180 capsule 3    ferrous  sulfate 324 mg (65 mg iron) TbEC Take 1 tablet (324 mg total) by mouth once daily.  0    flash glucose scanning reader (FREESTYLE EFREN 14 DAY READER) Misc Check blood glucose level 3 times daily. 1 each 0    flash glucose sensor (FREESTYLE EFREN 14 DAY SENSOR) Kit 1 application by Misc.(Non-Drug; Combo Route) route every 14 (fourteen) days. Disp 30 or 90 day refill 6 kit 3    gabapentin (NEURONTIN) 300 MG capsule Take 300 mg by mouth 3 (three) times daily.      HYDROcodone-acetaminophen (NORCO) 5-325 mg per tablet Take 1 tablet by mouth every 6 (six) hours as needed for Pain. (Patient not taking: Reported on 10/2/2023) 28 tablet 0    hydrocortisone 2.5 % cream Apply topically 2 (two) times daily as needed. (Patient not taking: Reported on 7/11/2023) 1 each 1    levothyroxine (SYNTHROID) 75 MCG tablet TAKE 1 TABLET(75 MCG) BY MOUTH BEFORE BREAKFAST 90 tablet 3    levothyroxine (SYNTHROID) 75 MCG tablet Take 1 tablet (75 mcg total) by mouth before breakfast. 90 tablet 3    LIDOcaine (LIDODERM) 5 % APPLY 1 TO 3 PATCHES TO BACK DAILY. REMOVE WITHIN 12 HOURS 30 patch 2    linaGLIPtin (TRADJENTA) 5 mg Tab tablet Take 1 tablet (5 mg total) by mouth once daily. 90 tablet 3    metFORMIN (GLUCOPHAGE) 500 MG tablet Take 1 tablet (500 mg total) by mouth 2 (two) times daily with meals. 180 tablet 3    mometasone (ELOCON) 0.1 % ointment Apply topically 2 (two) times a day. Mix 50/50 with mupirocin ointment. Apply in each nostril twice daily. (Patient not taking: Reported on 10/2/2023) 15 g 1    montelukast (SINGULAIR) 10 mg tablet TAKE 1 TABLET BY MOUTH  DAILY 90 tablet 3    ondansetron (ZOFRAN-ODT) 8 MG TbDL Take 1 tablet (8 mg total) by mouth every 8 (eight) hours as needed (nausea). (Patient not taking: Reported on 7/11/2023) 30 tablet 0    pantoprazole (PROTONIX) 20 MG tablet TAKE 1 TABLET(20 MG) BY MOUTH TWICE DAILY 180 tablet 1    polyethylene glycol (GLYCOLAX) 17 gram PwPk Take 17 g by mouth once daily. 90 each 3     traZODone (DESYREL) 50 MG tablet Take 1 tablet (50 mg total) by mouth every evening. 90 tablet 1    triamcinolone acetonide 0.1% (KENALOG) 0.1 % paste APPLY TO THE AFFECTED AREA WITH EVERY-TIP THREE TIMES DAILY      ubrogepant (UBRELVY) 100 mg tablet Take 1 tablet daily as needed for migraine headache. May take 1 additional tablet 2 hours later if needed. (Patient not taking: Reported on 7/11/2023) 16 tablet 2    valACYclovir (VALTREX) 1000 MG tablet Take 1 tablet (1,000 mg total) by mouth once daily. 30 tablet 2    vitamin D 1000 units Tab Take 1,000 Units by mouth once daily. D3      [DISCONTINUED] hyoscyamine (ANASPAZ,LEVSIN) 0.125 mg Tab TAKE 1 TABLET(125 MCG) BY MOUTH EVERY 8 HOURS AS NEEDED FOR URGENCY 270 tablet 3     No current facility-administered medications on file prior to visit.        Review of patient's allergies indicates:   Allergen Reactions    Codeine Itching and Nausea And Vomiting    Dilaudid [hydromorphone (bulk)] Other (See Comments)     Oversedating, head burning. Pt prefers to avoid.       Percocet [oxycodone-acetaminophen] Itching    Sulfa (sulfonamide antibiotics) Itching and Nausea And Vomiting           Latex, natural rubber Rash       OBJECTIVE:     Vital Signs:  Vitals:    10/17/23 1509   BP: 127/65   Pulse: 66   Resp: 18   Temp: 97.6 °F (36.4 °C)     There is no height or weight on file to calculate BMI.       Physical Exam  Constitutional:       Appearance: Normal appearance.   HENT:      Head: Normocephalic and atraumatic.      Nose: No congestion or rhinorrhea.      Mouth/Throat:      Mouth: Mucous membranes are moist.   Cardiovascular:      Rate and Rhythm: Normal rate.   Pulmonary:      Effort: No respiratory distress.      Breath sounds: Normal breath sounds.   Abdominal:      General: Bowel sounds are normal.      Palpations: Abdomen is soft.   Musculoskeletal:      Cervical back: Normal range of motion and neck supple.      Right lower leg: No edema.      Left lower leg: No  "edema.   Skin:     General: Skin is warm and dry.   Neurological:      Mental Status: She is alert. Mental status is at baseline.   Psychiatric:         Mood and Affect: Mood normal.         Laboratory  Lab Results   Component Value Date    WBC 4.50 09/30/2023    HGB 11.0 (L) 09/30/2023    HCT 32.3 (L) 09/30/2023     (H) 09/30/2023    PLT 72 (L) 09/30/2023     Lab Results   Component Value Date    INR 1.0 08/20/2023    INR 1.1 04/03/2023    INR 1.1 02/11/2022     Lab Results   Component Value Date    HGBA1C 6.6 (H) 09/27/2023     No results for input(s): "POCTGLUCOSE" in the last 72 hours.       ASSESSMENT & PLAN:     Anaylei was seen today for follow-up.    Diagnoses and all orders for this visit:        Problem List Items Addressed This Visit          Neuro    Lumbar spondylosis    Cervical spondylosis with radiculopathy    Parkinsonism - Primary                 Pulmonary    Pulmonary emphysema, unspecified emphysema type       Oncology    Malignant neoplasm of colon, unspecified part of colon   Continue home PT  Follow up with GI as scheduled for [facility]BO  Continue home meds    Instructions:  - [facility] Nurse Practitioner to schedule home follow-up visit with patient in 4-6 weeks or as needed.  - Continue all medications, treatments and therapies as ordered.   - Follow all instructions, recommendations as discussed.  - Maintain Safety Precautions at all times.  - Attend all medical appointments as scheduled.  - For worsening symptoms: call Primary Care Physician or Nurse Practitioner.  - For emergencies, call 911 or immediately report to the nearest emergency room.  - Limit Risks of environmental exposure to coronavirus/COVID-19 as discussed including: social distancing, hand hygiene, and limiting departures from the home for necessities only.        Were controlled substances prescribed?  No      Scheduled Follow-up :  No future appointments.    After Visit Medication List :     Medication List            Accurate " as of October 17, 2023  4:12 PM. If you have any questions, ask your nurse or doctor.                CONTINUE taking these medications      acetaminophen 650 MG Tbsr  Commonly known as: TYLENOL     ascorbic acid (vitamin C) 1000 MG tablet  Commonly known as: VITAMIN C     atorvastatin 40 MG tablet  Commonly known as: LIPITOR     biotin 10,000 mcg Tbdl     calcium-vitamin D3 500 mg-5 mcg (200 unit) per tablet  Commonly known as: OS-KASSANDRA 500 + D3     cranberry extract 200 mg Cap     donepeziL 10 MG tablet  Commonly known as: ARICEPT  TAKE 1 TABLET(10 MG) BY MOUTH EVERY EVENING     DULoxetine 30 MG capsule  Commonly known as: CYMBALTA  TAKE 1 CAPSULE BY MOUTH TWICE  DAILY     ferrous sulfate 324 mg (65 mg iron) Tbec  Take 1 tablet (324 mg total) by mouth once daily.     FREESTYLE EFREN 14 DAY READER Misc  Generic drug: flash glucose scanning reader  Check blood glucose level 3 times daily.     FREESTYLE EFREN 14 DAY SENSOR Kit  Generic drug: flash glucose sensor  1 application by Misc.(Non-Drug; Combo Route) route every 14 (fourteen) days. Disp 30 or 90 day refill     gabapentin 300 MG capsule  Commonly known as: NEURONTIN     HYDROcodone-acetaminophen 5-325 mg per tablet  Commonly known as: NORCO  Take 1 tablet by mouth every 6 (six) hours as needed for Pain.     hydrocortisone 2.5 % cream  Apply topically 2 (two) times daily as needed.     * levothyroxine 75 MCG tablet  Commonly known as: SYNTHROID  TAKE 1 TABLET(75 MCG) BY MOUTH BEFORE BREAKFAST     * levothyroxine 75 MCG tablet  Commonly known as: SYNTHROID  Take 1 tablet (75 mcg total) by mouth before breakfast.     LIDOcaine 5 %  Commonly known as: LIDODERM  APPLY 1 TO 3 PATCHES TO BACK DAILY. REMOVE WITHIN 12 HOURS     linaGLIPtin 5 mg Tab tablet  Commonly known as: TRADJENTA  Take 1 tablet (5 mg total) by mouth once daily.     metFORMIN 500 MG tablet  Commonly known as: GLUCOPHAGE  Take 1 tablet (500 mg total) by mouth 2 (two) times daily with meals.      mometasone 0.1 % ointment  Commonly known as: ELOCON  Apply topically 2 (two) times a day. Mix 50/50 with mupirocin ointment. Apply in each nostril twice daily.     montelukast 10 mg tablet  Commonly known as: SINGULAIR  TAKE 1 TABLET BY MOUTH  DAILY     ondansetron 8 MG Tbdl  Commonly known as: ZOFRAN-ODT  Take 1 tablet (8 mg total) by mouth every 8 (eight) hours as needed (nausea).     pantoprazole 20 MG tablet  Commonly known as: PROTONIX  TAKE 1 TABLET(20 MG) BY MOUTH TWICE DAILY     polyethylene glycol 17 gram Pwpk  Commonly known as: GLYCOLAX  Take 17 g by mouth once daily.     PREMARIN vaginal cream  Generic drug: conjugated estrogens  INSERT 0.5 GRAM VAGINALLY 2 TIMES A WEEK     traZODone 50 MG tablet  Commonly known as: DESYREL  Take 1 tablet (50 mg total) by mouth every evening.     triamcinolone acetonide 0.1% 0.1 % paste  Commonly known as: KENALOG     UBRELVY 100 mg tablet  Generic drug: ubrogepant  Take 1 tablet daily as needed for migraine headache. May take 1 additional tablet 2 hours later if needed.     valACYclovir 1000 MG tablet  Commonly known as: VALTREX  Take 1 tablet (1,000 mg total) by mouth once daily.     vitamin D 1000 units Tab  Commonly known as: VITAMIN D3           * This list has 2 medication(s) that are the same as other medications prescribed for you. Read the directions carefully, and ask your doctor or other care provider to review them with you.                  Patient consent was obtained prior to treatment on this visit.    Attestation: Screening criteria to assess the level of the patient's risk for infection with COVID-19 as recommended by the CDC at the time of the above documented home visit concluded appropriateness to proceed. Universal precautions were maintained at all times, including provider use of >60% alcohol gel hand  immediately prior to entry and upon departing the patient's home as well as cleaning of equipment used in home visit with  antibacterial/germicidal disposable wipes.     Signature:    ROBIN Navarro   [facility]    Total Face-to-Face Encounter: 60 minutes with >50% of time spent discussing the care with the patient/family.

## 2023-10-19 ENCOUNTER — TELEPHONE (OUTPATIENT)
Dept: ENDOSCOPY | Facility: HOSPITAL | Age: 78
End: 2023-10-19
Payer: MEDICARE

## 2023-10-19 NOTE — TELEPHONE ENCOUNTER
Telephoned pts daughter back to schedule EUS with no answer.  Voicemail message left with direct contact number for pt to return call.

## 2023-10-19 NOTE — TELEPHONE ENCOUNTER
----- Message from Luanne Shah sent at 10/19/2023 11:31 AM CDT -----  Regarding: pt call back  Name of Who is Calling: daughter        What is the request in detail: pt keeps receiving a notification about a procedure but they weren't aware of one, does have one in her chart but no time or date, would like a call back to discuss,please advise.        Can the clinic reply by [facility]: no          What Number to Call Back if not in [facility]: [phone]

## 2023-10-19 NOTE — TELEPHONE ENCOUNTER
Patient was referred for an EUS. She is not ready to schedule  Please call her daughter [phone]

## 2023-10-20 ENCOUNTER — PATIENT MESSAGE (OUTPATIENT)
Dept: ENDOSCOPY | Facility: HOSPITAL | Age: 78
End: 2023-10-20
Payer: MEDICARE

## 2023-10-20 ENCOUNTER — TELEPHONE (OUTPATIENT)
Dept: ENDOSCOPY | Facility: HOSPITAL | Age: 78
End: 2023-10-20
Payer: MEDICARE

## 2023-10-20 NOTE — TELEPHONE ENCOUNTER
Spoke to Pt's daughter to schedule procedure(s) Upper Endoscopy Ultrasound (EUS)       Physician to perform procedure(s) Dr. PANCHO Campos  Date of Procedure (s) 10/25/23  Arrival Time 10:00 AM  Time of Procedure(s) 11:00 AM   Location of Procedure(s) [place] 2nd Floor  Type of Rx Prep sent to patient: N/A  Instructions provided to patient via [facility]    Patient was informed on the following information and verbalized understanding. Screening questionnaire reviewed with patient and complete. If procedure requires anesthesia, a responsible adult needs to be present to accompany the patient home, patient cannot drive after receiving anesthesia. Appointment details are tentative, especially check-in time. Patient will receive a prep-op call 4 days prior to confirm check-in time for procedure. If applicable the patient should contact their pharmacy to verify Rx for procedure prep is ready for pick-up. Patient was advised to call the scheduling department at [phone] if pharmacy states no Rx is available. Patient was advised to call the endoscopy scheduling department if any questions or concerns arise.       Endoscopy Scheduling Department

## 2023-10-23 ENCOUNTER — TELEPHONE (OUTPATIENT)
Dept: ENDOSCOPY | Facility: HOSPITAL | Age: 78
End: 2023-10-23
Payer: MEDICARE

## 2023-10-23 NOTE — TELEPHONE ENCOUNTER
Spoke with patient's daughter about arrival time @ 1000.   Upper EUS    Day Before Procedure 10/24/23      You may have a light evening meal.   No solid food after 7:00 pm.   Continue drinking clear liquids.        Day of the Procedure 10/25/23      You may have water/clear liquids until 4 hours before your procedure or as directed by the scheduling nurse 7:00 AM.   See below for list.     What You CANNOT do:   Do not drink milk or anything colored red.  Do not drink alcohol.  No gum chewing or candy morning of procedure     Liquids That Are OK to Drink:   Water  Sports drinks (Gatorade, Power-Aid)  Coffee or tea (no cream or nondairy creamer)  Clear juices without pulp (apple, white grape)  Gelatin desserts (no fruit or toppings)  Clear soda (sprite, coke, ginger ale)  Chicken broth (until 12 midnight the night before procedure)      Medications: Do not take Insulin or oral diabetic medications the day of the procedure.    Take as prescribed: heart, seizure and blood pressure medication in the morning with a sip of water (less than an ounce).  Take any breathing medications and bring inhalers to hospital with you.     Leave all valuables and jewelry at home. Wear comfortable clothes to procedure to change into hospital gown.   You cannot drive for 24 hours after your procedure because you will receive sedation for your procedure to make you comfortable.    A ride must be provided at discharge.

## 2023-10-25 ENCOUNTER — ANESTHESIA (OUTPATIENT)
Dept: ENDOSCOPY | Facility: HOSPITAL | Age: 78
End: 2023-10-25
Payer: MEDICARE

## 2023-10-25 ENCOUNTER — HOSPITAL ENCOUNTER (OUTPATIENT)
Facility: HOSPITAL | Age: 78
Discharge: HOME | End: 2023-10-25
Attending: INTERNAL MEDICINE | Admitting: INTERNAL MEDICINE
Payer: MEDICARE

## 2023-10-25 ENCOUNTER — ANESTHESIA EVENT (OUTPATIENT)
Dept: ENDOSCOPY | Facility: HOSPITAL | Age: 78
End: 2023-10-25
Payer: MEDICARE

## 2023-10-25 ENCOUNTER — TELEPHONE (OUTPATIENT)
Dept: HEPATOLOGY | Facility: CLINIC | Age: 78
End: 2023-10-25
Payer: MEDICARE

## 2023-10-25 ENCOUNTER — PATIENT MESSAGE (OUTPATIENT)
Dept: INTERNAL MEDICINE | Facility: CLINIC | Age: 78
End: 2023-10-25
Payer: MEDICARE

## 2023-10-25 ENCOUNTER — TELEPHONE (OUTPATIENT)
Dept: INTERNAL MEDICINE | Facility: CLINIC | Age: 78
End: 2023-10-25
Payer: MEDICARE

## 2023-10-25 VITALS
OXYGEN SATURATION: 100 % | SYSTOLIC BLOOD PRESSURE: 116 MMHG | DIASTOLIC BLOOD PRESSURE: 49 MMHG | WEIGHT: 130 LBS | BODY MASS INDEX: 22.2 KG/M2 | HEIGHT: 64 IN | RESPIRATION RATE: 18 BRPM | HEART RATE: 72 BPM | TEMPERATURE: 98 F

## 2023-10-25 DIAGNOSIS — R10.9 ABDOMINAL PAIN, UNSPECIFIED ABDOMINAL LOCATION: ICD-10-CM

## 2023-10-25 DIAGNOSIS — R93.3 ABNORMAL FINDING ON GI TRACT IMAGING: ICD-10-CM

## 2023-10-25 DIAGNOSIS — I85.00 ESOPHAGEAL VARICES WITHOUT BLEEDING, UNSPECIFIED ESOPHAGEAL VARICES TYPE: Primary | ICD-10-CM

## 2023-10-25 DIAGNOSIS — B02.9 HERPES ZOSTER WITHOUT COMPLICATION: ICD-10-CM

## 2023-10-25 LAB — POCT GLUCOSE: 141 MG/DL (ref 70–110)

## 2023-10-25 PROCEDURE — D9220A PRA ANESTHESIA: ICD-10-PCS | Mod: CRNA,,, | Performed by: NURSE ANESTHETIST, CERTIFIED REGISTERED

## 2023-10-25 PROCEDURE — D9220A PRA ANESTHESIA: Mod: ANES,,, | Performed by: ANESTHESIOLOGY

## 2023-10-25 PROCEDURE — 63600175 PHARM REV CODE 636 W HCPCS: Performed by: NURSE ANESTHETIST, CERTIFIED REGISTERED

## 2023-10-25 PROCEDURE — 25000003 PHARM REV CODE 250: Performed by: NURSE ANESTHETIST, CERTIFIED REGISTERED

## 2023-10-25 PROCEDURE — 43235 EGD DIAGNOSTIC BRUSH WASH: CPT | Mod: ,,, | Performed by: INTERNAL MEDICINE

## 2023-10-25 PROCEDURE — D9220A PRA ANESTHESIA: Mod: CRNA,,, | Performed by: NURSE ANESTHETIST, CERTIFIED REGISTERED

## 2023-10-25 PROCEDURE — 25000003 PHARM REV CODE 250: Performed by: INTERNAL MEDICINE

## 2023-10-25 PROCEDURE — 37000008 HC ANESTHESIA 1ST 15 MINUTES: Performed by: INTERNAL MEDICINE

## 2023-10-25 PROCEDURE — 43235 PR EGD, FLEX, DIAGNOSTIC: ICD-10-PCS | Mod: ,,, | Performed by: INTERNAL MEDICINE

## 2023-10-25 PROCEDURE — 43235 EGD DIAGNOSTIC BRUSH WASH: CPT | Performed by: INTERNAL MEDICINE

## 2023-10-25 PROCEDURE — D9220A PRA ANESTHESIA: ICD-10-PCS | Mod: ANES,,, | Performed by: ANESTHESIOLOGY

## 2023-10-25 PROCEDURE — 37000009 HC ANESTHESIA EA ADD 15 MINS: Performed by: INTERNAL MEDICINE

## 2023-10-25 RX ORDER — SODIUM CHLORIDE 9 MG/ML
INJECTION, SOLUTION INTRAVENOUS CONTINUOUS
Status: DISCONTINUED | OUTPATIENT
Start: 2023-10-25 | End: 2023-10-25 | Stop reason: HOSPADM

## 2023-10-25 RX ORDER — SODIUM CHLORIDE 0.9 % (FLUSH) 0.9 %
10 SYRINGE (ML) INJECTION
Status: DISCONTINUED | OUTPATIENT
Start: 2023-10-25 | End: 2023-10-25 | Stop reason: HOSPADM

## 2023-10-25 RX ORDER — MAGNESIUM 30 MG
30 TABLET ORAL ONCE
COMMUNITY

## 2023-10-25 RX ORDER — PROPOFOL 10 MG/ML
VIAL (ML) INTRAVENOUS
Status: DISCONTINUED | OUTPATIENT
Start: 2023-10-25 | End: 2023-10-25

## 2023-10-25 RX ORDER — PROPOFOL 10 MG/ML
VIAL (ML) INTRAVENOUS CONTINUOUS PRN
Status: DISCONTINUED | OUTPATIENT
Start: 2023-10-25 | End: 2023-10-25

## 2023-10-25 RX ORDER — DEXMEDETOMIDINE HYDROCHLORIDE 100 UG/ML
INJECTION, SOLUTION INTRAVENOUS
Status: DISCONTINUED | OUTPATIENT
Start: 2023-10-25 | End: 2023-10-25

## 2023-10-25 RX ORDER — LIDOCAINE HYDROCHLORIDE 20 MG/ML
INJECTION INTRAVENOUS
Status: DISCONTINUED | OUTPATIENT
Start: 2023-10-25 | End: 2023-10-25

## 2023-10-25 RX ORDER — VALACYCLOVIR HYDROCHLORIDE 1 G/1
1000 TABLET, FILM COATED ORAL
Qty: 30 TABLET | Refills: 2 | Status: SHIPPED | OUTPATIENT
Start: 2023-10-25

## 2023-10-25 RX ADMIN — SODIUM CHLORIDE: 9 INJECTION, SOLUTION INTRAVENOUS at 10:10

## 2023-10-25 RX ADMIN — GLYCOPYRROLATE 0.2 MG: 0.2 INJECTION, SOLUTION INTRAMUSCULAR; INTRAVITREAL at 11:10

## 2023-10-25 RX ADMIN — DEXMEDETOMIDINE HYDROCHLORIDE 4 MCG: 100 INJECTION, SOLUTION, CONCENTRATE INTRAVENOUS at 11:10

## 2023-10-25 RX ADMIN — PROPOFOL 60 MG: 10 INJECTION, EMULSION INTRAVENOUS at 11:10

## 2023-10-25 RX ADMIN — PROPOFOL 150 MCG/KG/MIN: 10 INJECTION, EMULSION INTRAVENOUS at 11:10

## 2023-10-25 RX ADMIN — LIDOCAINE HYDROCHLORIDE 100 MG: 20 INJECTION, SOLUTION INTRAVENOUS at 11:10

## 2023-10-25 NOTE — [employer]OVATION PATIENT INSTRUCTIONS
Discharge Summary/Instructions after an Endoscopic Procedure  Patient Name: Anayeli Judd  Patient MRN: [id number]  Patient YOB: 1945 Wednesday, October 25, 2023  Jose Campos MD  Dear patient,  As a result of recent federal legislation (The Federal Cures Act), you may   receive lab or pathology results from your procedure in your [facility]   account before your physician is able to contact you. Your physician or   their representative will relay the results to you with their   recommendations at their soonest availability.  Thank you,  Your health is very important to us during the Covid Crisis. Following your   procedure today, you will receive a daily text for 2 weeks asking about   signs or symptoms of Covid 19.  Please respond to this text when you   receive it so we can follow up and keep you as safe as possible.   RESTRICTIONS:  During your procedure today, you received medications for sedation.  These   medications may affect your judgment, balance and coordination.  Therefore,   for 24 hours, you have the following restrictions:   - DO NOT drive a car, operate machinery, make legal/financial decisions,   sign important papers or drink alcohol.    ACTIVITY:  Today: no heavy lifting, straining or running due to procedural   sedation/anesthesia.  The following day: return to full activity including work.  DIET:  Eat and drink normally unless instructed otherwise.     TREATMENT FOR COMMON SIDE EFFECTS:  - Mild abdominal pain, nausea, belching, bloating or excessive gas:  rest,   eat lightly and use a heating pad.  - Sore Throat: treat with throat lozenges and/or gargle with warm salt   water.  - Because air was used during the procedure, expelling large amounts of air   from your rectum or belching is normal.  - If a bowel prep was taken, you may not have a bowel movement for 1-3 days.    This is normal.  SYMPTOMS TO WATCH FOR AND REPORT TO YOUR PHYSICIAN:  1. Abdominal pain or bloating, other than  gas cramps.  2. Chest pain.  3. Back pain.  4. Signs of infection such as: chills or fever occurring within 24 hours   after the procedure.  5. Rectal bleeding, which would show as bright red, maroon, or black stools.   (A tablespoon of blood from the rectum is not serious, especially if   hemorrhoids are present.)  6. Vomiting.  7. Weakness or dizziness.  GO DIRECTLY TO THE NEAREST EMERGENCY ROOM IF YOU HAVE ANY OF THE FOLLOWING:      Difficulty breathing              Chills and/or fever over 101 F   Persistent vomiting and/or vomiting blood   Severe abdominal pain   Severe chest pain   Black, tarry stools   Bleeding- more than one tablespoon   Any other symptom or condition that you feel may need urgent attention  Your doctor recommends these additional instructions:  If any biopsies were taken, your doctors clinic will contact you in 1 to 2   weeks with any results.  - Patient has a contact number available for emergencies.  The signs and   symptoms of potential delayed complications were discussed with the   patient.  Return to normal activities tomorrow.  Written discharge   instructions were provided to the patient.   - Discharge patient to home (ambulatory).   - Resume previous diet.   - Continue present medications.   - Return to referring physician.   - Refer to Hepatology at the next available appointment given finding of new   esophageal varices, possible portal HTN gastropathy, thrombocytopenia, etc.   May need to start low dose nonselective beta blocker for primary   prophylaxis, per establishing with Hepatology.  - Needs close follow up with general GI and PCP for chronic GI complaints   including reflux.   - If further eval for the pancreas duct finding of prominence/dilation in   panc head on CT from July 2023 (which was not appreciated on contrast CT in   Sept 2023) is wanted by referring physician, an MRI/MRCP with contrast with   pancreas and liver protocol could noninvasively evaluate the  pancreas and   duct as well as evaluate the liver further given today's new findings.  - If any future endoscopy is required, would recommend future procedures be   done at [facility] given patient's comorbidities/periprocedural risk.  For questions, problems or results please call your physician - Jose Campos MD.  EMERGENCY PHONE NUMBER: [phone],  LAB RESULTS: [phone]  IF A COMPLICATION OR EMERGENCY SITUATION ARISES AND YOU ARE UNABLE TO REACH   YOUR PHYSICIAN - GO DIRECTLY TO THE EMERGENCY ROOM.  Jose Campos MD  10/25/2023 12:05:47 PM  This report has been verified and signed electronically.  Dear patient,  As a result of recent federal legislation (The Federal Cures Act), you may   receive lab or pathology results from your procedure in your [facility]   account before your physician is able to contact you. Your physician or   their representative will relay the results to you with their   recommendations at their soonest availability.  Thank you,  PROVATION

## 2023-10-25 NOTE — TELEPHONE ENCOUNTER
Refill Routing Note   Medication(s) are not appropriate for processing by [facility] for the following reason(s):      Patient seen in ED/Hospital since LOV with provider    [facility] action(s):  Defer Care Due:  None identified          Appointments  past 12m or future 3m with PCP    Date Provider   Last Visit   6/6/2023 Darci Guthrie MD   Next Visit   Visit date not found Darci Guthrie MD   ED visits in past 90 days: 1        Note composed:12:54 PM 10/25/2023

## 2023-10-25 NOTE — TELEPHONE ENCOUNTER
No care due was identified.  [facility] Embedded Care Due Messages. Reference number: [id number].   10/25/2023 10:48:45 AM CDT

## 2023-10-25 NOTE — TELEPHONE ENCOUNTER
Refill Request Routed to the [facility] Review Pool for the pharmacist to review.

## 2023-10-25 NOTE — ANESTHESIA PREPROCEDURE EVALUATION
10/25/2023  Anayeli Judd is a 78 y.o., female.  Pre-operative evaluation for Procedure(s) (LRB):  ULTRASOUND, UPPER GI TRACT, ENDOSCOPIC (N/A)    Anayeli Judd is a 78 y.o. female     Patient Active Problem List   Diagnosis    Lumbar spondylosis    Cervical spondylosis with radiculopathy    Malignant neoplasm of colon, unspecified part of colon    Insomnia    Carpal tunnel syndrome    Headache(784.0)    Acquired hypothyroidism    History of colon cancer    Irritable bowel syndrome with both constipation and diarrhea    Chronic sinusitis of right maxilla    Diarrhea    Pelvic muscle wasting    GERD (gastroesophageal reflux disease)    Fatty liver    Partial small bowel obstruction    Family history of breast cancer    Gait instability    Type 2 diabetes mellitus without complication, without long-term current use of insulin    Acute deep vein thrombosis (DVT) of upper extremity    Wound infection after surgery    Chronic diarrhea    Fecal incontinence    Generalized abdominal pain    Vitamin B12 deficiency    Vaginal atrophy    Incomplete emptying of bladder    Mixed stress and urge urinary incontinence    Vaginal irritation    Fecal soiling    Irregular bowel habits    Elevated serum creatinine    Hypomagnesemia    Hyponatremia    Prolapse of female genital organs    Rectocele    Iron deficiency anemia due to chronic blood loss    Postmenopausal bleeding    SBO (small bowel obstruction)    Thrombocytopenia    Myelodysplastic syndrome    Small bowel obstruction due to adhesions    Constipation    Bilious vomiting with nausea    Parkinsonism    Memory change    Other chest pain    Atrophic pancreas    Other hyperlipidemia    Type 2 diabetes mellitus with diabetic peripheral angiopathy without gangrene, without long-term current use of insulin    Abdominal discomfort    Anxiety    Entrapment of  intestine in abdominal adhesions    Peripheral edema    Chronic low back pain    Weakness    Periumbilical abdominal pain    Pulmonary emphysema, unspecified emphysema type       Review of patient's allergies indicates:   Allergen Reactions    Codeine Itching and Nausea And Vomiting    Dilaudid [hydromorphone (bulk)] Other (See Comments)     Oversedating, head burning. Pt prefers to avoid.       Percocet [oxycodone-acetaminophen] Itching    Sulfa (sulfonamide antibiotics) Itching and Nausea And Vomiting           Latex, natural rubber Rash       No current facility-administered medications on file prior to encounter.     Current Outpatient Medications on File Prior to Encounter   Medication Sig Dispense Refill    acetaminophen (TYLENOL) 650 MG TbSR Take 1,300 mg by mouth 2 (two) times a day.      ascorbic acid, vitamin C, (VITAMIN C) 1000 MG tablet Take 1,000 mg by mouth once daily.      atorvastatin (LIPITOR) 40 MG tablet Take 40 mg by mouth once daily.      biotin 10,000 mcg TbDL Take 1 tablet by mouth once daily.       calcium-vitamin D3 (OS-KASSANDRA 500 + D3) 500 mg-5 mcg (200 unit) per tablet Take 1 tablet by mouth once daily.      conjugated estrogens (PREMARIN) vaginal cream INSERT 0.5 GRAM VAGINALLY 2 TIMES A WEEK 30 g 3    cranberry extract 200 mg Cap Take 1 capsule by mouth once daily.      donepeziL (ARICEPT) 10 MG tablet TAKE 1 TABLET(10 MG) BY MOUTH EVERY EVENING 90 tablet 3    DULoxetine (CYMBALTA) 30 MG capsule TAKE 1 CAPSULE BY MOUTH TWICE  DAILY 180 capsule 3    ferrous sulfate 324 mg (65 mg iron) TbEC Take 1 tablet (324 mg total) by mouth once daily.  0    flash glucose scanning reader (FREESTYLE EFREN 14 DAY READER) Misc Check blood glucose level 3 times daily. 1 each 0    flash glucose sensor (FREESTYLE EFREN 14 DAY SENSOR) Kit 1 application by Misc.(Non-Drug; Combo Route) route every 14 (fourteen) days. Disp 30 or 90 day refill 6 kit 3    gabapentin (NEURONTIN) 300 MG capsule Take 300 mg by mouth 3  (three) times daily.      HYDROcodone-acetaminophen (NORCO) 5-325 mg per tablet Take 1 tablet by mouth every 6 (six) hours as needed for Pain. (Patient not taking: Reported on 10/2/2023) 28 tablet 0    hydrocortisone 2.5 % cream Apply topically 2 (two) times daily as needed. (Patient not taking: Reported on 7/11/2023) 1 each 1    levothyroxine (SYNTHROID) 75 MCG tablet TAKE 1 TABLET(75 MCG) BY MOUTH BEFORE BREAKFAST 90 tablet 3    levothyroxine (SYNTHROID) 75 MCG tablet Take 1 tablet (75 mcg total) by mouth before breakfast. 90 tablet 3    linaGLIPtin (TRADJENTA) 5 mg Tab tablet Take 1 tablet (5 mg total) by mouth once daily. 90 tablet 3    metFORMIN (GLUCOPHAGE) 500 MG tablet Take 1 tablet (500 mg total) by mouth 2 (two) times daily with meals. 180 tablet 3    montelukast (SINGULAIR) 10 mg tablet TAKE 1 TABLET BY MOUTH  DAILY 90 tablet 3    ondansetron (ZOFRAN-ODT) 8 MG TbDL Take 1 tablet (8 mg total) by mouth every 8 (eight) hours as needed (nausea). (Patient not taking: Reported on 7/11/2023) 30 tablet 0    polyethylene glycol (GLYCOLAX) 17 gram PwPk Take 17 g by mouth once daily. 90 each 3    triamcinolone acetonide 0.1% (KENALOG) 0.1 % paste APPLY TO THE AFFECTED AREA WITH EVERY-TIP THREE TIMES DAILY      ubrogepant (UBRELVY) 100 mg tablet Take 1 tablet daily as needed for migraine headache. May take 1 additional tablet 2 hours later if needed. (Patient not taking: Reported on 7/11/2023) 16 tablet 2    valACYclovir (VALTREX) 1000 MG tablet Take 1 tablet (1,000 mg total) by mouth once daily. 30 tablet 2    vitamin D 1000 units Tab Take 1,000 Units by mouth once daily. D3      [DISCONTINUED] hyoscyamine (ANASPAZ,LEVSIN) 0.125 mg Tab TAKE 1 TABLET(125 MCG) BY MOUTH EVERY 8 HOURS AS NEEDED FOR URGENCY 270 tablet 3       Past Surgical History:   Procedure Laterality Date    ADENOIDECTOMY      APPENDECTOMY      BACK SURGERY      BREAST BIOPSY  1970    BREAST CYST EXCISION  1970?    BREAST LUMPECTOMY  1970    [facility]  TUNNEL RELEASE      bilateral      SECTION      CHOLECYSTECTOMY  1965    COLECTOMY  2011    Transverse colon resection by Dr. Aguirre    COLONOSCOPY N/A 2017    Procedure: COLONOSCOPY;  Surgeon: Manjit Alvarez MD;  Location: [facility] (4TH FLR);  Service: Endoscopy;  Laterality: N/A;    COLONOSCOPY N/A 2019    Procedure: COLONOSCOPY;  Surgeon: Ramiro Jefferson MD;  Location: [facility] (4TH FLR);  Service: Endoscopy;  Laterality: N/A;  PM prep    COLONOSCOPY N/A 2022    Procedure: COLONOSCOPY;  Surgeon: Ramiro Jefferson MD;  Location: [facility] (2ND FLR);  Service: Endoscopy;  Laterality: N/A;  EGD and colonoscopy in 6-8 weeks from now     states has constipation. -extended Golytely prep    CYSTOSCOPY N/A 2021    Procedure: CYSTOSCOPY;  Surgeon: Viridiana Valenzuela MD;  Location: [facility] OR 1ST FLR;  Service: Urology;  Laterality: N/A;    ENDOSCOPIC ULTRASOUND OF UPPER GASTROINTESTINAL TRACT N/A 2018    Procedure: ULTRASOUND, UPPER GI TRACT, ENDOSCOPIC;  Surgeon: Jose Hess MD;  Location: [facility];  Service: Endoscopy;  Laterality: N/A;    ENDOSCOPIC ULTRASOUND OF UPPER GASTROINTESTINAL TRACT N/A 2019    Procedure: ULTRASOUND, UPPER GI TRACT, ENDOSCOPIC;  Surgeon: Jose Hess MD;  Location: [facility] (2ND FLR);  Service: Endoscopy;  Laterality: N/A;    ESOPHAGOGASTRODUODENOSCOPY N/A 2018    Procedure: EGD (ESOPHAGOGASTRODUODENOSCOPY);  Surgeon: Angelo Reynolds MD;  Location: [facility] (2ND FLR);  Service: Endoscopy;  Laterality: N/A;    ESOPHAGOGASTRODUODENOSCOPY N/A 2019    Procedure: EGD (ESOPHAGOGASTRODUODENOSCOPY);  Surgeon: Ramiro Jefferson MD;  Location: [facility] (4TH FLR);  Service: Endoscopy;  Laterality: N/A;    ESOPHAGOGASTRODUODENOSCOPY N/A 2022    Procedure: EGD (ESOPHAGOGASTRODUODENOSCOPY);  Surgeon: Ramiro Jefferson MD;  Location: [facility] (2ND FLR);  Service: Endoscopy;  Laterality: N/A;  fully vaccinated-GT    EYE  SURGERY      Cataract Removal    FLUOROSCOPIC URODYNAMIC STUDY N/A 11/09/2021    Procedure: URODYNAMIC STUDY, FLUOROSCOPIC;  Surgeon: Viridiana Valenzuela MD;  Location: [facility] OR [address] [place] [address];  Service: Urology;  Laterality: N/A;  90 minutes     HYSTERECTOMY      JOINT REPLACEMENT      OOPHORECTOMY  1970    posterolateral fusion with autograft bone and [place] mineralized bone matrix  02/01/2013    at [facility] for lumbar spine stenosis    SMALL INTESTINE SURGERY      SPINE SURGERY      TOE SURGERY      TONSILLECTOMY      TRANSFORAMINAL EPIDURAL INJECTION OF STEROID Bilateral 12/21/2022    Procedure: Injection,steroid,epidural, Todd L5/S1 TF CONTRAST DIRECT REFERRAL;  Surgeon: Toni Osorio MD;  Location: [facility] [place] [facility];  Service: Pain Management;  Laterality: Bilateral;    TRIGGER FINGER RELEASE           CBC:  Lab Results   Component Value Date    WBC 4.50 09/30/2023    RBC 3.11 (L) 09/30/2023    HGB 11.0 (L) 09/30/2023    HCT 32.3 (L) 09/30/2023    PLT 72 (L) 09/30/2023     (H) 09/30/2023    MCH 35.4 (H) 09/30/2023    MCHC 34.1 09/30/2023       CMP:   Lab Results   Component Value Date     09/30/2023    K 3.3 (L) 09/30/2023     09/30/2023    CO2 25 09/30/2023    BUN 8 09/30/2023    CREATININE 0.7 09/30/2023     (H) 09/30/2023    MG 1.5 (L) 09/30/2023    PHOS 2.9 09/30/2023    CALCIUM 8.8 09/30/2023    ALBUMIN 2.9 (L) 09/30/2023    PROT 6.1 09/30/2023    ALKPHOS 104 09/30/2023    ALT 50 (H) 09/30/2023    AST 42 (H) 09/30/2023    BILITOT 1.4 (H) 09/30/2023       INR:  Lab Results   Component Value Date    INR 1.0 08/20/2023    APTT 32.3 (H) 08/20/2023         Diagnostic Studies:      EKG:   Results for orders placed or performed during the hospital encounter of 09/27/23   EKG 12-lead    Collection Time: 09/27/23  8:51 PM    Narrative    Test Reason : R10.9,    Vent. Rate : 067 BPM     Atrial Rate : 067 BPM     P-R Int : 136 ms          QRS Dur : 078 ms      QT Int : 430 ms       P-R-T Axes : 077 062 079  degrees     QTc Int : 454 ms    Normal sinus rhythm  Normal ECG  When compared with ECG of 20-AUG-2023 08:52,  Nonspecific T wave abnormality no longer evident in Inferior leads  Nonspecific T wave abnormality, improved in Lateral leads  Confirmed by RAMON SIFUENETS MD ([id number]) on 9/28/2023 2:15:46 PM    Referred By: DAVY   SELF           Confirmed By:RAMON SIFUENTES MD        2D Echo:  No results found. However, due to the size of the patient record, not all encounters were searched. Please check Results Review for a complete set of results.    Stress Test:   No results found for this or any previous visit.               Pre-op Assessment          Review of Systems      Physical Exam  General: Well nourished    Airway:  Mallampati: I / I  Mouth Opening: Normal  TM Distance: Normal  Tongue: Normal  Neck ROM: Normal ROM    Dental:  Intact    Chest/Lungs:  Clear to auscultation    Heart:  Rate: Normal  Rhythm: Regular Rhythm  Sounds: Normal        Anesthesia Plan  Type of Anesthesia, risks & benefits discussed:    Anesthesia Type: MAC, Gen ETT, Gen Supraglottic Airway, Gen Natural Airway  Intra-op Monitoring Plan: Standard ASA Monitors  Post Op Pain Control Plan: multimodal analgesia and peripheral nerve block  Induction:  IV  Informed Consent: Informed consent signed with the Patient and all parties understand the risks and agree with anesthesia plan.  All questions answered.   ASA Score: 3  Day of Surgery Review of History & Physical: H&P Update referred to the surgeon/provider.    Ready For Surgery From Anesthesia Perspective.     .

## 2023-10-25 NOTE — TRANSFER OF CARE
"Anesthesia Transfer of Care Note    Patient: Anayeli Judd    Procedure(s) Performed: Procedure(s) (LRB):  ULTRASOUND, UPPER GI TRACT, ENDOSCOPIC (N/A)  EGD (ESOPHAGOGASTRODUODENOSCOPY) (N/A)    Patient location: GI    Anesthesia Type: general    Transport from OR: Transported from OR on room air with adequate spontaneous ventilation    Post pain: adequate analgesia    Post assessment: no apparent anesthetic complications    Post vital signs: stable    Level of consciousness: awake, alert and oriented    Nausea/Vomiting: no nausea/vomiting    Complications: none    Transfer of care protocol was followed      Last vitals: Visit Vitals  BP (!) 116/56 (BP Location: Left arm, Patient Position: Lying)   Pulse 74   Temp 36.7 °C (98.1 °F) (Skin)   Resp 18   Ht 5' 4" (1.626 m)   Wt 59 kg (130 lb)   LMP  (LMP Unknown)   SpO2 96%   Breastfeeding No   BMI 22.31 kg/m²     "

## 2023-10-25 NOTE — TELEPHONE ENCOUNTER
Patient called to schedule a hepatology consult from referral.  No answer, left voicemail with call back # [phone].  Letter has been mailed.

## 2023-10-25 NOTE — H&P
Short Stay Endoscopy History and Physical    PCP - Darci Guthrie MD  Referring Physician - Darci Guthrie MD  [id number] [place] [address]    Procedure - EGD/EUS  ASA - per anesthesia  Mallampati - per anesthesia  History of Anesthesia problems - per anesthesia  Family history Anesthesia problems -  per anesthesia   Plan of anesthesia - per anesthesia    HPI:  This is a 78 y.o. female here for evaluation of: EGD for complaints of reflux/mucus expectoration, possible black stools/bleeding (hasn't had GI follow up in over 1 year) / EUS for previous image evidence of prominent main pancreas duct with interval resolution of that finding on most recent imaging; EUS to eval pancreas predominantly.    Patient has h/o recurrent SBOs due to adhesions (recently within past month or so.    Reflux - yes  Dysphagia - no  Abdominal pain - no  Diarrhea - no    ROS:  Constitutional: No fevers, chills  CV: No chest pain  Pulm: No cough, No shortness of breath  Ophtho: No vision changes  GI: see HPI  Derm: No rash    Medical History:  has a past medical history of Abdominal pain, Allergic rhinitis, Arthritis, Blood platelet disorder, Blood platelet disorder, Blood transfusion, Bowel obstruction, Cervical radiculopathy, Colon cancer (), Degenerative joint disease (DJD) of lumbar spine, Diabetes mellitus, Diarrhea, Falls (2020), Family history of breast cancer, Family history of colon cancer, Fatty liver, GERD (gastroesophageal reflux disease), History of shingles, Hyperlipidemia, Hypomagnesemia, Hypothyroidism, Irritable bowel syndrome, Microscopic colitis, Migraine, Myelodysplastic syndrome, Raynaud phenomenon, Raynaud's disease, Squamous cell carcinoma of skin, Type 2 diabetes mellitus, and Usual hyperplasia of lactiferous duct ().    Surgical History:  has a past surgical history that includes Hysterectomy; Appendectomy; Toe Surgery; Tonsillectomy;  section; posterolateral fusion with  autograft bone and Eun mineralized bone matrix (02/01/2013); Carpal tunnel release; Trigger finger release; Back surgery; Cholecystectomy (1965); Colectomy (06/27/2011); Colonoscopy (N/A, 07/27/2017); Eye surgery; Esophagogastroduodenoscopy (N/A, 11/16/2018); Endoscopic ultrasound of upper gastrointestinal tract (N/A, 12/12/2018); Endoscopic ultrasound of upper gastrointestinal tract (N/A, 01/23/2019); Esophagogastroduodenoscopy (N/A, 06/26/2019); Colonoscopy (N/A, 06/26/2019); Fluoroscopic urodynamic study (N/A, 11/09/2021); Cystoscopy (N/A, 11/09/2021); Adenoidectomy; Small intestine surgery; Spine surgery; Joint replacement; Esophagogastroduodenoscopy (N/A, 05/04/2022); Colonoscopy (N/A, 05/04/2022); Transforaminal epidural injection of steroid (Bilateral, 12/21/2022); Breast biopsy (1970); Breast lumpectomy (1970); Breast cyst excision (1970?); and Oophorectomy (1970).    Family History: family history includes Alcohol abuse in her brother, brother, and brother; Arthritis in her brother, brother, and daughter; Asthma in her daughter and daughter; Bladder Cancer in her mother; Breast cancer (age of onset: 79) in her sister; Cataracts in her mother; Colon cancer in her father; Colon cancer (age of onset: 70) in her brother; Depression in her daughter, daughter, and daughter; Glaucoma in her mother; Heart disease in her mother; Heart disease (age of onset: 50) in her father; Hyperlipidemia in her mother; Hypertension in her daughter; Kidney disease in her mother; Parkinsonism in her brother; Stroke in her daughter; Stroke (age of onset: 40) in her daughter..    Social History:  reports that she has never smoked. She has never used smokeless tobacco. She reports that she does not currently use alcohol. She reports that she does not use drugs.    Review of patient's allergies indicates:   Allergen Reactions    Codeine Itching and Nausea And Vomiting    Dilaudid [hydromorphone (bulk)] Other (See Comments)      Oversedating, head burning. Pt prefers to avoid.       Percocet [oxycodone-acetaminophen] Itching    Sulfa (sulfonamide antibiotics) Itching and Nausea And Vomiting           Latex, natural rubber Rash       Medications:   Medications Prior to Admission   Medication Sig Dispense Refill Last Dose    acetaminophen (TYLENOL) 650 MG TbSR Take 1,300 mg by mouth 2 (two) times a day.       ascorbic acid, vitamin C, (VITAMIN C) 1000 MG tablet Take 1,000 mg by mouth once daily.       atorvastatin (LIPITOR) 40 MG tablet Take 40 mg by mouth once daily.       biotin 10,000 mcg TbDL Take 1 tablet by mouth once daily.        calcium-vitamin D3 (OS-KASSANDRA 500 + D3) 500 mg-5 mcg (200 unit) per tablet Take 1 tablet by mouth once daily.       conjugated estrogens (PREMARIN) vaginal cream INSERT 0.5 GRAM VAGINALLY 2 TIMES A WEEK 30 g 3     cranberry extract 200 mg Cap Take 1 capsule by mouth once daily.       donepeziL (ARICEPT) 10 MG tablet TAKE 1 TABLET(10 MG) BY MOUTH EVERY EVENING 90 tablet 3     DULoxetine (CYMBALTA) 30 MG capsule TAKE 1 CAPSULE BY MOUTH TWICE  DAILY 180 capsule 3     ferrous sulfate 324 mg (65 mg iron) TbEC Take 1 tablet (324 mg total) by mouth once daily.  0     flash glucose scanning reader (FREESTYLE EFREN 14 DAY READER) Misc Check blood glucose level 3 times daily. 1 each 0     flash glucose sensor (FREESTYLE EFREN 14 DAY SENSOR) Kit 1 application by Misc.(Non-Drug; Combo Route) route every 14 (fourteen) days. Disp 30 or 90 day refill 6 kit 3     gabapentin (NEURONTIN) 300 MG capsule Take 300 mg by mouth 3 (three) times daily.       HYDROcodone-acetaminophen (NORCO) 5-325 mg per tablet Take 1 tablet by mouth every 6 (six) hours as needed for Pain. (Patient not taking: Reported on 10/2/2023) 28 tablet 0     hydrocortisone 2.5 % cream Apply topically 2 (two) times daily as needed. (Patient not taking: Reported on 7/11/2023) 1 each 1     levothyroxine (SYNTHROID) 75 MCG tablet TAKE 1 TABLET(75 MCG) BY MOUTH BEFORE  BREAKFAST 90 tablet 3     levothyroxine (SYNTHROID) 75 MCG tablet Take 1 tablet (75 mcg total) by mouth before breakfast. 90 tablet 3     LIDOcaine (LIDODERM) 5 % APPLY 1 TO 3 PATCHES TO BACK DAILY. REMOVE WITHIN 12 HOURS 30 patch 2     linaGLIPtin (TRADJENTA) 5 mg Tab tablet Take 1 tablet (5 mg total) by mouth once daily. 90 tablet 3     metFORMIN (GLUCOPHAGE) 500 MG tablet Take 1 tablet (500 mg total) by mouth 2 (two) times daily with meals. 180 tablet 3     mometasone (ELOCON) 0.1 % ointment Apply topically 2 (two) times a day. Mix 50/50 with mupirocin ointment. Apply in each nostril twice daily. (Patient not taking: Reported on 10/2/2023) 15 g 1     montelukast (SINGULAIR) 10 mg tablet TAKE 1 TABLET BY MOUTH  DAILY 90 tablet 3     ondansetron (ZOFRAN-ODT) 8 MG TbDL Take 1 tablet (8 mg total) by mouth every 8 (eight) hours as needed (nausea). (Patient not taking: Reported on 7/11/2023) 30 tablet 0     pantoprazole (PROTONIX) 20 MG tablet TAKE 1 TABLET(20 MG) BY MOUTH TWICE DAILY 180 tablet 1     polyethylene glycol (GLYCOLAX) 17 gram PwPk Take 17 g by mouth once daily. 90 each 3     traZODone (DESYREL) 50 MG tablet Take 1 tablet (50 mg total) by mouth every evening. 90 tablet 1     triamcinolone acetonide 0.1% (KENALOG) 0.1 % paste APPLY TO THE AFFECTED AREA WITH EVERY-TIP THREE TIMES DAILY       ubrogepant (UBRELVY) 100 mg tablet Take 1 tablet daily as needed for migraine headache. May take 1 additional tablet 2 hours later if needed. (Patient not taking: Reported on 7/11/2023) 16 tablet 2     valACYclovir (VALTREX) 1000 MG tablet Take 1 tablet (1,000 mg total) by mouth once daily. 30 tablet 2     vitamin D 1000 units Tab Take 1,000 Units by mouth once daily. D3          Physical Exam:    Vital Signs: There were no vitals filed for this visit.    General Appearance: Well appearing in no acute distress    Labs:  Lab Results   Component Value Date    WBC 4.50 09/30/2023    HGB 11.0 (L) 09/30/2023    HCT 32.3 (L)  09/30/2023    PLT 72 (L) 09/30/2023    CHOL 150 08/04/2020    TRIG 128 11/29/2022    HDL 71 08/04/2020    ALT 50 (H) 09/30/2023    AST 42 (H) 09/30/2023     09/30/2023    K 3.3 (L) 09/30/2023     09/30/2023    CREATININE 0.7 09/30/2023    BUN 8 09/30/2023    CO2 25 09/30/2023    TSH 6.050 (H) 06/06/2023    INR 1.0 08/20/2023    GLUF 148 (H) 08/15/2006    HGBA1C 6.6 (H) 09/27/2023       I have explained the risks and benefits of this endoscopic procedure to the patient including but not limited to bleeding, inflammation, infection, perforation, pancreatitis, missing a lesion and death.      Jose Campos MD

## 2023-10-25 NOTE — TELEPHONE ENCOUNTER
----- Message from Violeta Gerardo RN sent at 10/25/2023 12:41 PM CDT -----  Per MD Campos:  Please have this patient set up for a next available Hepatology evaluation for new findings of esophageal varices I encountered during today's upper endoscopy

## 2023-10-25 NOTE — ANESTHESIA POSTPROCEDURE EVALUATION
Anesthesia Post Evaluation    Patient: June Parker Dutch    Procedure(s) Performed: Procedure(s) (LRB):  ULTRASOUND, UPPER GI TRACT, ENDOSCOPIC (N/A)  EGD (ESOPHAGOGASTRODUODENOSCOPY) (N/A)    Final Anesthesia Type: general      Patient location during evaluation: PACU  Patient participation: Yes- Able to Participate  Level of consciousness: awake and alert  Post-procedure vital signs: reviewed and stable  Pain management: adequate  Airway patency: patent    PONV status at discharge: No PONV  Anesthetic complications: no      Cardiovascular status: blood pressure returned to baseline  Respiratory status: unassisted  Hydration status: euvolemic  Follow-up not needed.          Vitals Value Taken Time   /49 10/25/23 1217   Temp 36.9 10/25/23 1301   Pulse 72 10/25/23 1217   Resp 18 10/25/23 1217   SpO2 100 % 10/25/23 1217         Event Time   Out of Recovery 12:30:19         Pain/Roscoe Score: No data recorded

## 2023-10-26 NOTE — TELEPHONE ENCOUNTER
Dr. Jose Campos (GI) attempted EUS and noted esophageal varicies. Contacted me with rec for Hepatology eval and GI f/u. Pt did not answer phone today which is not unusual. I have contacted her daughter, Danielle, and discussed findings and plan. Gave her phone # to schedule Hepatology. We will attempt to help schedule f/u with her GI (Dr. Doe). Dr. Campos did note MRI/MRCP with contrast could better evaluate pancreatic duct dilation noted previously, though this was improved on recent CT. No concerning masses. Agreed with daughter on having specialist f/u first then could reconsider benefits of particular further testing.

## 2023-11-03 ENCOUNTER — OFFICE VISIT (OUTPATIENT)
Dept: HEPATOLOGY | Facility: CLINIC | Age: 78
End: 2023-11-03
Payer: MEDICARE

## 2023-11-03 ENCOUNTER — PROCEDURE VISIT (OUTPATIENT)
Dept: HEPATOLOGY | Facility: CLINIC | Age: 78
End: 2023-11-03
Payer: MEDICARE

## 2023-11-03 ENCOUNTER — LAB VISIT (OUTPATIENT)
Dept: LAB | Facility: HOSPITAL | Age: 78
End: 2023-11-03
Attending: INTERNAL MEDICINE
Payer: MEDICARE

## 2023-11-03 ENCOUNTER — PATIENT MESSAGE (OUTPATIENT)
Dept: INTERNAL MEDICINE | Facility: CLINIC | Age: 78
End: 2023-11-03
Payer: MEDICARE

## 2023-11-03 VITALS
RESPIRATION RATE: 18 BRPM | OXYGEN SATURATION: 98 % | BODY MASS INDEX: 25.15 KG/M2 | HEART RATE: 80 BPM | SYSTOLIC BLOOD PRESSURE: 107 MMHG | DIASTOLIC BLOOD PRESSURE: 58 MMHG | WEIGHT: 136.69 LBS | TEMPERATURE: 96 F | HEIGHT: 62 IN

## 2023-11-03 DIAGNOSIS — K76.9 LIVER DISEASE, UNSPECIFIED: ICD-10-CM

## 2023-11-03 DIAGNOSIS — I85.10 SECONDARY ESOPHAGEAL VARICES WITHOUT BLEEDING: ICD-10-CM

## 2023-11-03 DIAGNOSIS — R74.8 ABNORMAL LIVER ENZYMES: ICD-10-CM

## 2023-11-03 DIAGNOSIS — Z87.898 HISTORY OF ALCOHOL USE: ICD-10-CM

## 2023-11-03 DIAGNOSIS — K76.6 PORTAL HYPERTENSION: ICD-10-CM

## 2023-11-03 LAB
ALBUMIN SERPL BCP-MCNC: 3.8 G/DL (ref 3.5–5.2)
ALP SERPL-CCNC: 77 U/L (ref 55–135)
ALT SERPL W/O P-5'-P-CCNC: 31 U/L (ref 10–44)
AST SERPL-CCNC: 38 U/L (ref 10–40)
BILIRUB DIRECT SERPL-MCNC: 0.3 MG/DL (ref 0.1–0.3)
BILIRUB SERPL-MCNC: 0.6 MG/DL (ref 0.1–1)
FERRITIN SERPL-MCNC: 153 NG/ML (ref 20–300)
INR PPP: 1 (ref 0.8–1.2)
PROT SERPL-MCNC: 7.7 G/DL (ref 6–8.4)
PROTHROMBIN TIME: 10.8 SEC (ref 9–12.5)

## 2023-11-03 PROCEDURE — 99215 OFFICE O/P EST HI 40 MIN: CPT | Mod: PBBFAC | Performed by: INTERNAL MEDICINE

## 2023-11-03 PROCEDURE — 99999 PR PBB SHADOW E&M-EST. PATIENT-LVL V: CPT | Mod: PBBFAC,,, | Performed by: INTERNAL MEDICINE

## 2023-11-03 PROCEDURE — 83540 ASSAY OF IRON: CPT | Performed by: INTERNAL MEDICINE

## 2023-11-03 PROCEDURE — 80321 ALCOHOLS BIOMARKERS 1OR 2: CPT | Performed by: INTERNAL MEDICINE

## 2023-11-03 PROCEDURE — 91200 LIVER ELASTOGRAPHY: CPT | Mod: 26,S$PBB,, | Performed by: INTERNAL MEDICINE

## 2023-11-03 PROCEDURE — 99214 OFFICE O/P EST MOD 30 MIN: CPT | Mod: S$PBB,,, | Performed by: INTERNAL MEDICINE

## 2023-11-03 PROCEDURE — 91200 LIVER ELASTOGRAPHY: CPT | Mod: PBBFAC

## 2023-11-03 PROCEDURE — 80076 HEPATIC FUNCTION PANEL: CPT | Performed by: INTERNAL MEDICINE

## 2023-11-03 PROCEDURE — 91200 FIBROSCAN NEW ORLEANS: ICD-10-PCS | Mod: 26,S$PBB,, | Performed by: INTERNAL MEDICINE

## 2023-11-03 PROCEDURE — 85610 PROTHROMBIN TIME: CPT | Performed by: INTERNAL MEDICINE

## 2023-11-03 PROCEDURE — 36415 COLL VENOUS BLD VENIPUNCTURE: CPT | Performed by: INTERNAL MEDICINE

## 2023-11-03 PROCEDURE — 82728 ASSAY OF FERRITIN: CPT | Performed by: INTERNAL MEDICINE

## 2023-11-03 PROCEDURE — 99214 PR OFFICE/OUTPT VISIT, EST, LEVL IV, 30-39 MIN: ICD-10-PCS | Mod: S$PBB,,, | Performed by: INTERNAL MEDICINE

## 2023-11-03 PROCEDURE — 84466 ASSAY OF TRANSFERRIN: CPT | Performed by: INTERNAL MEDICINE

## 2023-11-03 PROCEDURE — 99999 PR PBB SHADOW E&M-EST. PATIENT-LVL V: ICD-10-PCS | Mod: PBBFAC,,, | Performed by: INTERNAL MEDICINE

## 2023-11-03 NOTE — PROGRESS NOTES
Hepatology Consult Note    Referring provider: Dr. Jose Campos  PCP: Darci Guthrie MD    Chief complaint: esophageal varices     HPI:  Anayeli Judd is a 78 y.o. female with history of fatty liver, DM Type 2, HLD, who was referred to Hepatology Clinic for incidental finding of esophageal varices. The patient presents with her daughter, who helps provide HPI. Per chart review, esophageal varices were incidentally identified during upper endoscopy in Oct 2023. The patient reports being diagnosed with fatty many years ago. She denies prior diagnosis of cirrhosis.     Regarding risk factors for liver disease, the patient reports history of heavy alcohol use in the past. She has not consumed alcohol in many years. Reports prior blood transfusions around childbirth . She  denies prior history of illicit drug use. MASLD risk factors: Reports prior history of being overweight, DM, HLD. Denies history of HTN, EL. Denies family history of liver disease or liver cancer.     The patient denies signs/symptoms of decompensated liver disease, including no recent abdominal distension, encephalopathy, jaundice, hematemesis. The patient denies prior evaluation by hepatologist, liver biopsy, paracentesis.    Reports prior cholecystectomy, appendectomy, hysterectomy, colon resection.     Past Medical History:   Diagnosis Date    Abdominal pain     Allergic rhinitis     Arthritis     Blood platelet disorder     Blood platelet disorder     Blood transfusion     during delivery and     Bowel obstruction     Cervical radiculopathy     followed by dr cloud    Colon cancer     transverse colon; resected; Stage IIA (pT3 pN0 MX)    Degenerative joint disease (DJD) of lumbar spine     Diabetes mellitus     Diarrhea     Falls 2020    Family history of breast cancer     Family history of colon cancer     Fatty liver     GERD (gastroesophageal reflux disease)     History of shingles     Hyperlipidemia      Hypomagnesemia     Hypothyroidism     Irritable bowel syndrome     Microscopic colitis     treated     Migraine     Myelodysplastic syndrome     Raynaud phenomenon     Raynaud's disease     Squamous cell carcinoma of skin     Type 2 diabetes mellitus     Usual hyperplasia of lactiferous duct 1970       Past Surgical History:   Procedure Laterality Date    ADENOIDECTOMY      APPENDECTOMY      BACK SURGERY      BREAST BIOPSY  1970    BREAST CYST EXCISION  1970?    BREAST LUMPECTOMY  1970    CARPAL TUNNEL RELEASE      bilateral      SECTION      CHOLECYSTECTOMY  1965    COLECTOMY  2011    Transverse colon resection by Dr. Aguirre    COLONOSCOPY N/A 2017    Procedure: COLONOSCOPY;  Surgeon: Manjit Alvarez MD;  Location: [facility] (4TH FLR);  Service: Endoscopy;  Laterality: N/A;    COLONOSCOPY N/A 2019    Procedure: COLONOSCOPY;  Surgeon: Ramiro Jefferson MD;  Location: [facility] (4TH FLR);  Service: Endoscopy;  Laterality: N/A;  PM prep    COLONOSCOPY N/A 2022    Procedure: COLONOSCOPY;  Surgeon: Ramiro Jefferson MD;  Location: [facility] (2ND FLR);  Service: Endoscopy;  Laterality: N/A;  EGD and colonoscopy in 6-8 weeks from now     states has constipation. -extended Golytely prep    CYSTOSCOPY N/A 2021    Procedure: CYSTOSCOPY;  Surgeon: Viridiana Valenzuela MD;  Location: [facility] OR 1ST FLR;  Service: Urology;  Laterality: N/A;    ENDOSCOPIC ULTRASOUND OF UPPER GASTROINTESTINAL TRACT N/A 2018    Procedure: ULTRASOUND, UPPER GI TRACT, ENDOSCOPIC;  Surgeon: Jose Hess MD;  Location: [facility];  Service: Endoscopy;  Laterality: N/A;    ENDOSCOPIC ULTRASOUND OF UPPER GASTROINTESTINAL TRACT N/A 2019    Procedure: ULTRASOUND, UPPER GI TRACT, ENDOSCOPIC;  Surgeon: Jose Hess MD;  Location: [facility] (2ND FLR);  Service: Endoscopy;  Laterality: N/A;    ENDOSCOPIC ULTRASOUND OF UPPER GASTROINTESTINAL TRACT N/A 10/25/2023    Procedure: ULTRASOUND, UPPER GI TRACT,  ENDOSCOPIC;  Surgeon: Jose Campos MD;  Location: [facility];  Service: Endoscopy;  Laterality: N/A;  10/20/23: instructions sent via portal-GD    ESOPHAGOGASTRODUODENOSCOPY N/A 11/16/2018    Procedure: EGD (ESOPHAGOGASTRODUODENOSCOPY);  Surgeon: Angelo Reynolds MD;  Location: [facility] (2ND FLR);  Service: Endoscopy;  Laterality: N/A;    ESOPHAGOGASTRODUODENOSCOPY N/A 06/26/2019    Procedure: EGD (ESOPHAGOGASTRODUODENOSCOPY);  Surgeon: Ramiro Jefferson MD;  Location: [facility] (4TH FLR);  Service: Endoscopy;  Laterality: N/A;    ESOPHAGOGASTRODUODENOSCOPY N/A 05/04/2022    Procedure: EGD (ESOPHAGOGASTRODUODENOSCOPY);  Surgeon: Ramiro Jefferson MD;  Location: [facility] (2ND FLR);  Service: Endoscopy;  Laterality: N/A;  fully vaccinated-GT    ESOPHAGOGASTRODUODENOSCOPY N/A 10/25/2023    Procedure: EGD (ESOPHAGOGASTRODUODENOSCOPY);  Surgeon: Jose Campos MD;  Location: [facility];  Service: Endoscopy;  Laterality: N/A;    EYE SURGERY      Cataract Removal    FLUOROSCOPIC URODYNAMIC STUDY N/A 11/09/2021    Procedure: URODYNAMIC STUDY, FLUOROSCOPIC;  Surgeon: Viridiana Valenzuela MD;  Location: [facility] OR 1ST FLR;  Service: Urology;  Laterality: N/A;  90 minutes     HYSTERECTOMY      JOINT REPLACEMENT      OOPHORECTOMY  1970    posterolateral fusion with autograft bone and Eun mineralized bone matrix  02/01/2013    at [facility] for lumbar spine stenosis    SMALL INTESTINE SURGERY      SPINE SURGERY      TOE SURGERY      TONSILLECTOMY      TRANSFORAMINAL EPIDURAL INJECTION OF STEROID Bilateral 12/21/2022    Procedure: Injection,steroid,epidural, Todd L5/S1 TF CONTRAST DIRECT REFERRAL;  Surgeon: Toni Osorio MD;  Location: [facility] PAIN MGT;  Service: Pain Management;  Laterality: Bilateral;    TRIGGER FINGER RELEASE         Family History   Problem Relation Age of Onset    Heart disease Father 50        Mi age 50    Colon cancer Father     Bladder Cancer Mother         non smoker    Cataracts Mother     Glaucoma Mother      Heart disease Mother     Hyperlipidemia Mother     Kidney disease Mother     Breast cancer Sister 79    Arthritis Daughter     Asthma Daughter     Depression Daughter     Hypertension Daughter     Stroke Daughter 40    Arthritis Brother     Colon cancer Brother 70    Alcohol abuse Brother     Parkinsonism Brother     Alcohol abuse Brother     Depression Daughter     Stroke Daughter     Alcohol abuse Brother     Arthritis Brother     Asthma Daughter     Depression Daughter     Celiac disease Neg Hx     Cirrhosis Neg Hx     Colon polyps Neg Hx     Crohn's disease Neg Hx     Cystic fibrosis Neg Hx     Esophageal cancer Neg Hx     Hemochromatosis Neg Hx     Inflammatory bowel disease Neg Hx     Irritable bowel syndrome Neg Hx     Liver cancer Neg Hx     Liver disease Neg Hx     Rectal cancer Neg Hx     Stomach cancer Neg Hx     Ulcerative colitis Neg Hx     Eliazar's disease Neg Hx     Amblyopia Neg Hx     Blindness Neg Hx     Macular degeneration Neg Hx     Retinal detachment Neg Hx     Strabismus Neg Hx     Melanoma Neg Hx        Social History     Tobacco Use    Smoking status: Never    Smokeless tobacco: Never   Substance Use Topics    Alcohol use: Not Currently     Comment: socially, rarely    Drug use: Never       Current Outpatient Medications   Medication Sig Dispense Refill    acetaminophen (TYLENOL) 650 MG TbSR Take 1,300 mg by mouth 2 (two) times a day.      ascorbic acid, vitamin C, (VITAMIN C) 1000 MG tablet Take 1,000 mg by mouth once daily.      biotin 10,000 mcg TbDL Take 1 tablet by mouth once daily.       calcium-vitamin D3 (OS-KASSANDRA 500 + D3) 500 mg-5 mcg (200 unit) per tablet Take 1 tablet by mouth once daily.      conjugated estrogens (PREMARIN) vaginal cream INSERT 0.5 GRAM VAGINALLY 2 TIMES A WEEK 30 g 3    cranberry extract 200 mg Cap Take 1 capsule by mouth once daily.      donepeziL (ARICEPT) 10 MG tablet TAKE 1 TABLET(10 MG) BY MOUTH EVERY EVENING 90 tablet 3    DULoxetine (CYMBALTA) 30 MG  capsule TAKE 1 CAPSULE BY MOUTH TWICE  DAILY 180 capsule 3    ferrous sulfate 324 mg (65 mg iron) TbEC Take 1 tablet (324 mg total) by mouth once daily.  0    flash glucose scanning reader (FREESTYLE EFREN 14 DAY READER) Misc Check blood glucose level 3 times daily. 1 each 0    flash glucose sensor (FREESTYLE EFREN 14 DAY SENSOR) Kit 1 application by Misc.(Non-Drug; Combo Route) route every 14 (fourteen) days. Disp 30 or 90 day refill 6 kit 3    gabapentin (NEURONTIN) 300 MG capsule Take 300 mg by mouth 3 (three) times daily.      HYDROcodone-acetaminophen (NORCO) 5-325 mg per tablet Take 1 tablet by mouth every 6 (six) hours as needed for Pain. 28 tablet 0    hydrocortisone 2.5 % cream Apply topically 2 (two) times daily as needed. 1 each 1    levothyroxine (SYNTHROID) 75 MCG tablet TAKE 1 TABLET(75 MCG) BY MOUTH BEFORE BREAKFAST 90 tablet 3    levothyroxine (SYNTHROID) 75 MCG tablet Take 1 tablet (75 mcg total) by mouth before breakfast. 90 tablet 3    LIDOcaine (LIDODERM) 5 % APPLY 1 TO 3 PATCHES TO BACK DAILY. REMOVE WITHIN 12 HOURS 30 patch 2    magnesium 30 mg Tab Take 30 mg by mouth once. Patient does not know actual dose in MG      metFORMIN (GLUCOPHAGE) 500 MG tablet Take 1 tablet (500 mg total) by mouth 2 (two) times daily with meals. 180 tablet 3    mometasone (ELOCON) 0.1 % ointment Apply topically 2 (two) times a day. Mix 50/50 with mupirocin ointment. Apply in each nostril twice daily. 15 g 1    montelukast (SINGULAIR) 10 mg tablet TAKE 1 TABLET BY MOUTH  DAILY 90 tablet 3    ondansetron (ZOFRAN-ODT) 8 MG TbDL Take 1 tablet (8 mg total) by mouth every 8 (eight) hours as needed (nausea). 30 tablet 0    pantoprazole (PROTONIX) 20 MG tablet TAKE 1 TABLET(20 MG) BY MOUTH TWICE DAILY 180 tablet 1    polyethylene glycol (GLYCOLAX) 17 gram PwPk Take 17 g by mouth once daily. 90 each 3    traZODone (DESYREL) 50 MG tablet Take 1 tablet (50 mg total) by mouth every evening. 90 tablet 1    ubrogepant (UBRELVY)  "100 mg tablet Take 1 tablet daily as needed for migraine headache. May take 1 additional tablet 2 hours later if needed. 16 tablet 2    valACYclovir (VALTREX) 1000 MG tablet TAKE 1 TABLET(1000 MG) BY MOUTH EVERY DAY 30 tablet 2    atorvastatin (LIPITOR) 40 MG tablet Take 40 mg by mouth once daily.      linaGLIPtin (TRADJENTA) 5 mg Tab tablet Take 1 tablet (5 mg total) by mouth once daily. 90 tablet 3    triamcinolone acetonide 0.1% (KENALOG) 0.1 % paste APPLY TO THE AFFECTED AREA WITH EVERY-TIP THREE TIMES DAILY      vitamin D 1000 units Tab Take 1,000 Units by mouth once daily. D3       No current facility-administered medications for this visit.       Review of patient's allergies indicates:   Allergen Reactions    Codeine Itching and Nausea And Vomiting    Dilaudid [hydromorphone (bulk)] Other (See Comments)     Oversedating, head burning. Pt prefers to avoid.       Percocet [oxycodone-acetaminophen] Itching    Sulfa (sulfonamide antibiotics) Itching and Nausea And Vomiting           Latex, natural rubber Rash            Vitals:    11/03/23 1428   BP: (!) 107/58   Pulse: 80   Resp: 18   Temp: 96.2 °F (35.7 °C)   TempSrc: Oral   SpO2: 98%   Weight: 62 kg (136 lb 11 oz)   Height: 5' 2" (1.575 m)   Body mass index is 25 kg/m².    Physical Exam  Constitutional:       General: She is not in acute distress.  Eyes:      General: No scleral icterus.  Cardiovascular:      Rate and Rhythm: Normal rate.   Pulmonary:      Effort: Pulmonary effort is normal.   Abdominal:      General: There is no distension.      Palpations: Abdomen is soft. There is no fluid wave, hepatomegaly or splenomegaly.      Tenderness: There is no abdominal tenderness.   Musculoskeletal:      Right lower leg: No edema.      Left lower leg: No edema.   Skin:     General: Skin is warm.      Coloration: Skin is not jaundiced.      Comments: No spider angiomas. No palmar erythema. No leukonychia.    Neurological:      General: No focal deficit present. "      Mental Status: She is alert and oriented to person, place, and time.      Comments: No asterixis.   Psychiatric:         Behavior: Behavior is cooperative.         Thought Content: Thought content normal.         LABS: I personally reviewed pertinent laboratory findings.    Lab Results   Component Value Date    WBC 4.50 09/30/2023    HGB 11.0 (L) 09/30/2023    HCT 32.3 (L) 09/30/2023     (H) 09/30/2023    PLT 72 (L) 09/30/2023       Lab Results   Component Value Date     09/30/2023    K 3.3 (L) 09/30/2023     09/30/2023    CO2 25 09/30/2023    BUN 8 09/30/2023    CREATININE 0.7 09/30/2023    CALCIUM 8.8 09/30/2023    ANIONGAP 10 09/30/2023    ESTGFRAFRICA >60.0 06/11/2022    EGFRNONAA >60.0 06/11/2022       Lab Results   Component Value Date    ALT 31 11/03/2023    AST 38 11/03/2023    GGT 72 (H) 11/03/2023    ALKPHOS 77 11/03/2023    BILITOT 0.6 11/03/2023       Lab Results   Component Value Date    HEPAIGM Negative 09/09/2020    HEPBIGM Negative 09/09/2020    HEPBCAB Negative 09/09/2020    HEPCAB Non-reactive 07/07/2023       Lab Results   Component Value Date    TOSHIA Negative <1:160 07/31/2014       IMAGING: I personally reviewed imaging studies.    CT A/P with contrast 9/27/2023  FINDINGS:  Heart: Normal in size. No pericardial effusion. Calcifications of the aortic valve.     Lungs: There is bibasilar atelectasis.  No airspace consolidation.     Liver: Normal in size and contour.  No focal hepatic lesion.  Portal vein is patent.  No portal venous gas.     Gallbladder: Surgically absent.     Bile Ducts: No evidence of dilated ducts.     Pancreas: Proximal pancreatic duct appears less dilated on today's examination as compared to prior.  No mass or peripancreatic fat stranding.     Spleen: Enlarged measuring 12 cm.     Stomach and duodenum: Small hiatal hernia.  Duodenum is distended.     Adrenals: Unremarkable.     Kidneys/ Ureters: Normal in size and location. Normal enhancement.  Small  renal hypodensities, too small to characterize, unchanged from prior.  No hydronephrosis or nephrolithiasis. No ureteral dilatation.     Bladder: Bladder is distended.  No wall thickening.     Reproductive organs: Uterus is surgically absent.  No adnexal lesions.     Bowel/Mesentery: Postoperative changes of partial bowel resection.  Multiple dilated loops of small bowel measuring up to 4.0 cm.  Many loops are in close approximation to the anterior abdominal wall and suggestive of underlying adhesions.     Cecal and ascending colon wall thickening and hyperenhancement, with surrounding fluid/inflammatory change.  The terminal ileum is collapsed.  The appendix is not identified; it is perhaps surgically absent.     Lymph nodes: No lymphadenapathy.     Vasculature: No aneurysm. Moderate calcific atherosclerosis.     Abdominal wall:  Postoperative changes of the anterior abdominal wall.     Bones: No acute fracture. No suspicious osseous lesions.  Lower lumbar facet arthropathy.  Pre-existing L5-S1 spondylolisthesis and bilateral L5 spondylolysis.    EGD 10/25/2023  Indications:           Suspected esophageal reflux, Esophageal reflux   Impression:            - Grade II mid-distal esophageal varices, which is                          a new and unexpected finding in this patient                          without documented cirrhosis (possible prior fatty                          liver) and will require further eval with                          Hepatology at next available.                          - Given the new finding of prominent esophageal                          varices and the patient's known comorbidities                          alongside thrombocytopenia, an EUS scope was not                          passed today so only an EGD was performed                          (previous indication for the EUS for prominence of                          pancreas duct appeared to improve/resolve on most                           recent CT scan - and MRI/MRCP with contrast could                          be done for further noninvasive eval of the                          pancreas duct/parenchyma).                          - Esophagogastric landmarks identified.                          - 1cm hiatal hernia.                          - Localized mucosal change characterized by                          punctate/focal black discoloration/pigmentation                          was seen without ulceration in the gastric body;                          given peculiar mucosal appearance without knowing                          if a vessel was underlying, this was not                          manipulated or treated as no evidence of recent                          bleeding was identified.                          - Congestive gastropathy, possible portal HTN                          gastropathy.                          - Normal duodenal bulb and second portion of the                          duodenum.                          - No specimens collected.                          - During procedure, I decided against passing EUS                          scope in setting of new diagnosis of esophageal                          varices without recent comprehensive Hepatology                          eval or a plan for management of these; also since                          most recent contrast CT in Sept 2023 did not                          appreciate the prominent pancreas duct in the head                          as the CT in July 2023 had commented on. An                          MRI/MRCP with contrast can help further                          characterize less invasively.                          - If further eval for the pancreas duct finding of                          prominence/dilation in panc head on CT from July 2023 (which was not appreciated on contrast CT in                          Sept 2023) is wanted by  referring physician, an                          MRI/MRCP with contrast with pancreas and liver                          protocol could noninvasively evaluate the pancreas                          and duct as well as evaluate the liver further                          given today's new findings.     Assessment:   Anayeli Judd is a 78 y.o. female with history of fatty liver, DM Type 2, HLD, who was referred to Hepatology Clinic for incidental finding of esophageal varices. Per chart review, liver enzymes have been intermittently elevated in a hepatocellular pattern for many years. Platelets are low, and INR is normal. On imaging, the liver is normal in size and contour, portal vein is patent, spleen is enlarged. Presentation is likely secondary to undiagnosed cirrhosis with portal hypertension. 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    1. Secondary esophageal varices without bleeding    2. Portal hypertension    3. Abnormal liver enzymes    4. Liver disease, unspecified    5. History of alcohol use      Recommendations:  - LFTs, INR  - PETH   - Iron, TIBC, ferritin  - FibroSure   - FibroScan   - US liver doppler   - Not a candidate for NSBB due to soft BP  - Will need EGD with banding     Return to clinic in 3 months.    I have sent communication to the referring physician and/or primary care provider.    Jaqueline Espinoza MD  Staff Physician  Hepatology and Liver Transplant  [facility]  [facility] [place]

## 2023-11-03 NOTE — PROCEDURES
FibroScan [place]    Date/Time: 11/3/2023 3:45 PM    Performed by: Joaquim Brown MA  Authorized by: Jaqueline Espinoza MD    Diagnosis:  Alcohol and NAFLD    Probe:  M    Universal Protocol: Patient's identity, procedure and site were verified, confirmatory pause was performed.  Discussed procedure including risks and potential complications.  Questions answered.  Patient verbalizes understanding and wishes to proceed with VCTE.     Procedure: After providing explanations of the procedure, patient was placed in the supine position with right arm in maximum abduction to allow optimal exposure of right lateral abdomen.  Patient was briefly assessed, Testing was performed in the mid-axillary location, 50Hz Shear Wave pulses were applied and the resulting Shear Wave and Propagation Speed detected with a 3.5 MHz ultrasonic signal, using the FibroScan probe, Skin to liver capsule distance and liver parenchyma were accessed during the entire examination with the FibroScan probe, Patient was instructed to breathe normally and to abstain from sudden movements during the procedure, allowing for random measurements of liver stiffness. At least 10 Shear Waves were produced, Individual measurements of each Shear Wave were calculated.  Patient tolerated the procedure well with no complications.  Meets discharge criteria as was dismissed.  Rates pain 0 out of 10.  Patient will follow up with ordering provider to review results.    Findings  Median liver stiffness score:  27.3  CAP Reading: dB/m:  210    Interpretation  Fibrosis interpretation is based on medial liver stiffness - Kilopascal (kPa).    Fibrosis Stage:  F4  Steatosis interpretation is based on controlled attenuation parameter - (dB/m).    Steatosis Grade:  <S1

## 2023-11-04 LAB
IRON SERPL-MCNC: 93 UG/DL (ref 30–160)
SATURATED IRON: 21 % (ref 20–50)
TOTAL IRON BINDING CAPACITY: 445 UG/DL (ref 250–450)
TRANSFERRIN SERPL-MCNC: 301 MG/DL (ref 200–375)

## 2023-11-06 LAB
CLINICAL BIOCHEMIST REVIEW: NORMAL
PLPETH BLD-MCNC: <10 NG/ML
POPETH BLD-MCNC: <10 NG/ML

## 2023-11-07 ENCOUNTER — PATIENT MESSAGE (OUTPATIENT)
Dept: INTERNAL MEDICINE | Facility: CLINIC | Age: 78
End: 2023-11-07
Payer: MEDICARE

## 2023-11-07 ENCOUNTER — TELEPHONE (OUTPATIENT)
Dept: INTERNAL MEDICINE | Facility: CLINIC | Age: 78
End: 2023-11-07
Payer: MEDICARE

## 2023-11-07 ENCOUNTER — TELEPHONE (OUTPATIENT)
Dept: HEPATOLOGY | Facility: CLINIC | Age: 78
End: 2023-11-07
Payer: MEDICARE

## 2023-11-07 ENCOUNTER — PATIENT MESSAGE (OUTPATIENT)
Dept: HEPATOLOGY | Facility: CLINIC | Age: 78
End: 2023-11-07
Payer: MEDICARE

## 2023-11-07 PROBLEM — K76.6 PORTAL HYPERTENSION: Status: ACTIVE | Noted: 2023-11-07

## 2023-11-07 LAB
A2 MACROGLOB SERPL-MCNC: 333 MG/DL (ref 100–280)
ALT SERPL W P-5'-P-CCNC: 36 U/L (ref 7–45)
APO A-I SERPL-MCNC: 166 MG/DL
BILIRUB SERPL-MCNC: 0.5 MG/DL (ref 0–1.2)
BIOPREDICTIVE SERIAL NUMBER: ABNORMAL
FIBROSIS STAGE SERPL QL: ABNORMAL
FIBROTEST INTERPRETATION: ABNORMAL
FIBROTEST-ACTITEST COMMENT: ABNORMAL
GGT SERPL-CCNC: 72 U/L (ref 5–36)
HAPTOGLOB SERPL-MCNC: 71 MG/DL (ref 30–200)
LIVER FIBR SCORE SERPL CALC.FIBROSURE: 0.69
NECROINFLAMMAT INTERP: ABNORMAL
NECROINFLAMMATORY ACT GRADE SERPL QL: ABNORMAL
NECROINFLAMMATORY ACT SCORE SERPL: 0.3

## 2023-11-07 NOTE — TELEPHONE ENCOUNTER
Discussed with daughter. Low bg noted since last night on freestyle dev sensor. Hold dm meds for the moment but discussed need to compare the freestyle dev numbers to fingerstick since not unusual to find that freestyle dev is inaccurate and sensor needs replacing. She will have caretaker see if still has necessary supplies then compare numbers and get back to me.

## 2023-11-07 NOTE — TELEPHONE ENCOUNTER
Call returned to Danielle Hernández at [phone].  Informed Danielle to give her a glucose tablet and food, then recheck her blood sugar.   If it continues blood sugar continues to be low, please take her to an ER, or Urgent Care.  You need to reach out to her Diabetic Dr or her PCP.    I don't know how much she has been eating and taking her Diabetic medication.  It may need to be adjusted.  Danielle stated she has not been eating much. I have to force her to eat.    Danielle said she has a call out to her Dr (that ordered the DM) now.  Keep us updated on how she is doing.  Danielle voiced understanding.

## 2023-11-07 NOTE — TELEPHONE ENCOUNTER
----- Message from Sarahy Clark LPN sent at 11/7/2023  8:16 AM CST -----  Patients daughter called to say her moms blood sugar is running low. This morning it was 43. She did drink some OJ this morning. Please call the daughter at [phone]. She is aware you will call her a little later this morning to discuss.

## 2023-11-08 ENCOUNTER — TELEPHONE (OUTPATIENT)
Dept: ENDOSCOPY | Facility: HOSPITAL | Age: 78
End: 2023-11-08
Payer: MEDICARE

## 2023-11-08 ENCOUNTER — PATIENT MESSAGE (OUTPATIENT)
Dept: HEPATOLOGY | Facility: CLINIC | Age: 78
End: 2023-11-08
Payer: MEDICARE

## 2023-11-08 ENCOUNTER — TELEPHONE (OUTPATIENT)
Dept: HEPATOLOGY | Facility: CLINIC | Age: 78
End: 2023-11-08
Payer: MEDICARE

## 2023-11-08 ENCOUNTER — HOSPITAL ENCOUNTER (OUTPATIENT)
Dept: RADIOLOGY | Facility: HOSPITAL | Age: 78
Discharge: HOME | End: 2023-11-08
Attending: INTERNAL MEDICINE
Payer: MEDICARE

## 2023-11-08 VITALS — HEIGHT: 62 IN | BODY MASS INDEX: 25.03 KG/M2 | WEIGHT: 136 LBS

## 2023-11-08 DIAGNOSIS — I85.10 SECONDARY ESOPHAGEAL VARICES WITHOUT BLEEDING: ICD-10-CM

## 2023-11-08 DIAGNOSIS — I85.10 SECONDARY ESOPHAGEAL VARICES WITHOUT BLEEDING: Primary | ICD-10-CM

## 2023-11-08 PROCEDURE — 93976 US LIVER WITH DOPPLER: ICD-10-PCS | Mod: 26,,, | Performed by: RADIOLOGY

## 2023-11-08 PROCEDURE — 76705 US LIVER WITH DOPPLER: ICD-10-PCS | Mod: 26,59,, | Performed by: RADIOLOGY

## 2023-11-08 PROCEDURE — 93976 VASCULAR STUDY: CPT | Mod: TC

## 2023-11-08 PROCEDURE — 93976 VASCULAR STUDY: CPT | Mod: 26,,, | Performed by: RADIOLOGY

## 2023-11-08 PROCEDURE — 76705 ECHO EXAM OF ABDOMEN: CPT | Mod: TC

## 2023-11-08 PROCEDURE — 76705 ECHO EXAM OF ABDOMEN: CPT | Mod: 26,59,, | Performed by: RADIOLOGY

## 2023-11-08 NOTE — TELEPHONE ENCOUNTER
"Endoscopy Scheduling (Valorie) was contacted to schedule EGD.  Valorie stated "she will reach to the patient to schedule".    "

## 2023-11-08 NOTE — TELEPHONE ENCOUNTER
Please see message below. Can procedure be done by any GI physician? Please advise.    Thank You,    Suki

## 2023-11-08 NOTE — TELEPHONE ENCOUNTER
Received call from Madisyn in hepatology clinic calling to schedule patient for an EGD. Informed Madisyn I would have to contact patient to schedule procedure. Madisyn verbalized understanding.

## 2023-11-08 NOTE — TELEPHONE ENCOUNTER
Contacted patient to schedule EGD procedure. Offered patient availability of 11/15 with Dr. Sanchez. Patient requesting to have EGD done by Dr. Espinoza or Dr. Jefferson. Patient informed that there is no availability for requested MD's until March 2024. Patient would like to know if Dr. Espinoza is ok with another physician to do procedure. Message sent.

## 2023-11-09 ENCOUNTER — TELEPHONE (OUTPATIENT)
Dept: ENDOSCOPY | Facility: HOSPITAL | Age: 78
End: 2023-11-09
Payer: MEDICARE

## 2023-11-09 ENCOUNTER — PATIENT MESSAGE (OUTPATIENT)
Dept: INTERNAL MEDICINE | Facility: CLINIC | Age: 78
End: 2023-11-09
Payer: MEDICARE

## 2023-11-09 RX ORDER — LANCETS 28 GAUGE
30 EACH MISCELLANEOUS DAILY
Qty: 30 EACH | Refills: 3 | Status: SHIPPED | OUTPATIENT
Start: 2023-11-09

## 2023-11-09 NOTE — TELEPHONE ENCOUNTER
Spoke to patient to schedule procedure(s) Upper Endoscopy (EGD)       Physician to perform procedure(s) Dr. LARON Sanchez  Date of Procedure (s) 11/15/23  Arrival Time 8:45 AM  Time of Procedure(s) 9:45 AM   Location of Procedure(s) [address]  Type of Rx Prep sent to patient: N/A  Instructions provided to patient via [facility]    Patient was informed on the following information and verbalized understanding. Screening questionnaire reviewed with patient and complete. If procedure requires anesthesia, a responsible adult needs to be present to accompany the patient home, patient cannot drive after receiving anesthesia. Appointment details are tentative, especially check-in time. Patient will receive a prep-op call 4 days prior to confirm check-in time for procedure. If applicable the patient should contact their pharmacy to verify Rx for procedure prep is ready for pick-up. Patient was advised to call the scheduling department at [phone] if pharmacy states no Rx is available. Patient was advised to call the endoscopy scheduling department if any questions or concerns arise.      [facility] Scheduling Department

## 2023-11-09 NOTE — TELEPHONE ENCOUNTER
November 8, 2023  Jaqueline Espinoza MD  to [facility]    11/8/23  4:10 PM   Thank you for your message. I am ok with the patient having the procedure done with next available provider. Thank you.            11/8/23  4:00 PM  You routed this conversation to Jaqueline Espinoza MD    Me         11/8/23  4:00 PM  Note  Please see message below. Can procedure be done by any GI physician? Please advise.     Thank You,     Suki         Me         11/8/23  3:58 PM  Note  Contacted patient to schedule EGD procedure. Offered patient availability of 11/15 with Dr. Sanchez. Patient requesting to have EGD done by Dr. Espinoza or Dr. Jefferson. Patient informed that there is no availability for requested MD's until March 2024. Patient would like to know if Dr. Espinoza is ok with another physician to do procedure. Message sent.                  11/8/23  3:45 PM    You contacted Anayeli Judd    Me         11/8/23  3:40 PM  Note  Received call from Madisyn in hepatology clinic calling to schedule patient for an EGD. Informed Madisyn I would have to contact patient to schedule procedure. Madisyn verbalized understanding.

## 2023-11-09 NOTE — TELEPHONE ENCOUNTER
November 8, 2023  Jaqueline Espinoza MD  to [facility]    11/8/23  4:10 PM   Thank you for your message. I am ok with the patient having the procedure done with next available provider. Thank you.              11/8/23  4:00 PM  You routed this conversation to Jaqueline Espinoza MD    Me          11/8/23  4:00 PM  Note  Please see message below. Can procedure be done by any GI physician? Please advise.     Thank You,     Suki          Me          11/8/23  3:58 PM  Note  Contacted patient to schedule EGD procedure. Offered patient availability of 11/15 with Dr. Sanchez. Patient requesting to have EGD done by Dr. Espinoza or Dr. Jefferson. Patient informed that there is no availability for requested MD's until March 2024. Patient would like to know if Dr. Espinoza is ok with another physician to do procedure. Message sent.                     11/8/23  3:45 PM    You contacted Anayeli Judd    Me          11/8/23  3:40 PM  Note  Received call from Madisyn in hepatology clinic calling to schedule patient for an EGD. Informed Madisyn I would have to contact patient to schedule procedure. Madisyn verbalized understanding.

## 2023-11-15 ENCOUNTER — ANESTHESIA (OUTPATIENT)
Dept: ENDOSCOPY | Facility: HOSPITAL | Age: 78
End: 2023-11-15
Payer: MEDICARE

## 2023-11-15 ENCOUNTER — HOSPITAL ENCOUNTER (OUTPATIENT)
Facility: HOSPITAL | Age: 78
Discharge: HOME | End: 2023-11-15
Attending: INTERNAL MEDICINE | Admitting: INTERNAL MEDICINE
Payer: MEDICARE

## 2023-11-15 ENCOUNTER — ANESTHESIA EVENT (OUTPATIENT)
Dept: ENDOSCOPY | Facility: HOSPITAL | Age: 78
End: 2023-11-15
Payer: MEDICARE

## 2023-11-15 VITALS
HEIGHT: 62 IN | HEART RATE: 65 BPM | SYSTOLIC BLOOD PRESSURE: 103 MMHG | TEMPERATURE: 98 F | DIASTOLIC BLOOD PRESSURE: 54 MMHG | OXYGEN SATURATION: 100 % | WEIGHT: 133 LBS | RESPIRATION RATE: 16 BRPM | BODY MASS INDEX: 24.48 KG/M2

## 2023-11-15 DIAGNOSIS — I85.00 ESOPHAGEAL VARICES: ICD-10-CM

## 2023-11-15 LAB — POCT GLUCOSE: 160 MG/DL (ref 70–110)

## 2023-11-15 PROCEDURE — 25000003 PHARM REV CODE 250: Performed by: NURSE ANESTHETIST, CERTIFIED REGISTERED

## 2023-11-15 PROCEDURE — E9220 PRA ENDO ANESTHESIA: ICD-10-PCS | Mod: ,,, | Performed by: NURSE ANESTHETIST, CERTIFIED REGISTERED

## 2023-11-15 PROCEDURE — 43235 EGD DIAGNOSTIC BRUSH WASH: CPT | Mod: ,,, | Performed by: INTERNAL MEDICINE

## 2023-11-15 PROCEDURE — 63600175 PHARM REV CODE 636 W HCPCS: Performed by: NURSE ANESTHETIST, CERTIFIED REGISTERED

## 2023-11-15 PROCEDURE — 43235 EGD DIAGNOSTIC BRUSH WASH: CPT | Performed by: INTERNAL MEDICINE

## 2023-11-15 PROCEDURE — 37000009 HC ANESTHESIA EA ADD 15 MINS: Performed by: INTERNAL MEDICINE

## 2023-11-15 PROCEDURE — E9220 PRA ENDO ANESTHESIA: HCPCS | Mod: ,,, | Performed by: NURSE ANESTHETIST, CERTIFIED REGISTERED

## 2023-11-15 PROCEDURE — 37000008 HC ANESTHESIA 1ST 15 MINUTES: Performed by: INTERNAL MEDICINE

## 2023-11-15 PROCEDURE — 43235 PR EGD, FLEX, DIAGNOSTIC: ICD-10-PCS | Mod: ,,, | Performed by: INTERNAL MEDICINE

## 2023-11-15 RX ORDER — LIDOCAINE HYDROCHLORIDE 20 MG/ML
INJECTION INTRAVENOUS
Status: DISCONTINUED | OUTPATIENT
Start: 2023-11-15 | End: 2023-11-15

## 2023-11-15 RX ORDER — SODIUM CHLORIDE 9 MG/ML
INJECTION, SOLUTION INTRAVENOUS CONTINUOUS
Status: DISCONTINUED | OUTPATIENT
Start: 2023-11-15 | End: 2023-11-15 | Stop reason: HOSPADM

## 2023-11-15 RX ORDER — PROPOFOL 10 MG/ML
VIAL (ML) INTRAVENOUS
Status: DISCONTINUED | OUTPATIENT
Start: 2023-11-15 | End: 2023-11-15

## 2023-11-15 RX ADMIN — LIDOCAINE HYDROCHLORIDE 50 MG: 20 INJECTION INTRAVENOUS at 10:11

## 2023-11-15 RX ADMIN — PROPOFOL 50 MG: 10 INJECTION, EMULSION INTRAVENOUS at 10:11

## 2023-11-15 RX ADMIN — SODIUM CHLORIDE: 0.9 INJECTION, SOLUTION INTRAVENOUS at 09:11

## 2023-11-15 NOTE — TRANSFER OF CARE
"Anesthesia Transfer of Care Note    Patient: Anayeli Jdud    Procedure(s) Performed: Procedure(s) (LRB):  ESOPHAGOGASTRODUODENOSCOPY (EGD) (N/A)    Patient location: PACU    Anesthesia Type: general    Transport from OR: Transported from OR on room air with adequate spontaneous ventilation    Post pain: adequate analgesia    Post assessment: no apparent anesthetic complications and tolerated procedure well    Post vital signs: stable    Level of consciousness: awake, alert and oriented    Nausea/Vomiting: no nausea/vomiting    Complications: none    Transfer of care protocol was followed      Last vitals: Visit Vitals  BP (!) 108/66 (BP Location: Left arm, Patient Position: Lying)   Pulse 69   Temp 36.5 °C (97.7 °F) (Temporal)   Resp 16   Ht 5' 2" (1.575 m)   Wt 60.3 kg (133 lb)   LMP  (LMP Unknown)   SpO2 99%   Breastfeeding No   BMI 24.33 kg/m²     "

## 2023-11-15 NOTE — ANESTHESIA POSTPROCEDURE EVALUATION
Anesthesia Post Evaluation    Patient: Anayeli Hardik Judd    Procedure(s) Performed: Procedure(s) (LRB):  ESOPHAGOGASTRODUODENOSCOPY (EGD) (N/A)    Final Anesthesia Type: general      Patient location during evaluation: GI PACU  Patient participation: Yes- Able to Participate  Level of consciousness: awake and alert and oriented  Post-procedure vital signs: reviewed and stable  Pain management: adequate  Airway patency: patent    PONV status at discharge: No PONV  Anesthetic complications: no      Cardiovascular status: blood pressure returned to baseline and hemodynamically stable  Respiratory status: unassisted, spontaneous ventilation and room air  Hydration status: euvolemic  Follow-up not needed.          Vitals Value Taken Time   /54 11/15/23 1055   Temp 36.5 °C (97.7 °F) 11/15/23 1022   Pulse 65 11/15/23 1055   Resp 16 11/15/23 1055   SpO2 100 % 11/15/23 1055         Event Time   Out of Recovery 11:02:46         Pain/Roscoe Score: Roscoe Score: 10 (11/15/2023 10:55 AM)

## 2023-11-15 NOTE — H&P
Short Stay Endoscopy History and Physical    PCP - Darci Guthrie MD     Procedure - EGD  ASA - per anesthesia  Mallampati - per anesthesia  History of Anesthesia problems - no  Family history Anesthesia problems -  no   Plan of anesthesia - General    HPI:  This is a 78 y.o. female here for evaluation of :     F/up of varices incidentally seen; F4 fibrosis.       ROS:  Constitutional: No fevers, chills, No weight loss  CV: No chest pain  Pulm: No cough, No shortness of breath  Ophtho: No vision changes  GI: see HPI  Derm: No rash    Medical History:  has a past medical history of Abdominal pain, Allergic rhinitis, Arthritis, Blood platelet disorder, Blood platelet disorder, Blood transfusion, Bowel obstruction, Cervical radiculopathy, Colon cancer (), Degenerative joint disease (DJD) of lumbar spine, Diabetes mellitus, Diarrhea, Falls (2020), Family history of breast cancer, Family history of colon cancer, Fatty liver, GERD (gastroesophageal reflux disease), History of shingles, Hyperlipidemia, Hypomagnesemia, Hypothyroidism, Irritable bowel syndrome, Microscopic colitis, Migraine, Myelodysplastic syndrome, Raynaud phenomenon, Raynaud's disease, Squamous cell carcinoma of skin, Type 2 diabetes mellitus, and Usual hyperplasia of lactiferous duct ().    Surgical History:  has a past surgical history that includes Hysterectomy; Appendectomy; Toe Surgery; Tonsillectomy;  section; posterolateral fusion with autograft bone and [place] mineralized bone matrix (2013); Carpal tunnel release; Trigger finger release; Back surgery; Cholecystectomy (1965); Colectomy (2011); Colonoscopy (N/A, 2017); Eye surgery; Esophagogastroduodenoscopy (N/A, 2018); Endoscopic ultrasound of upper gastrointestinal tract (N/A, 2018); Endoscopic ultrasound of upper gastrointestinal tract (N/A, 2019); Esophagogastroduodenoscopy (N/A, 2019); Colonoscopy (N/A, 2019);  Fluoroscopic urodynamic study (N/A, 11/09/2021); Cystoscopy (N/A, 11/09/2021); Adenoidectomy; Small intestine surgery; Spine surgery; Joint replacement; Esophagogastroduodenoscopy (N/A, 05/04/2022); Colonoscopy (N/A, 05/04/2022); Transforaminal epidural injection of steroid (Bilateral, 12/21/2022); Breast biopsy (1970); Breast lumpectomy (1970); Breast cyst excision (1970?); Oophorectomy (1970); Endoscopic ultrasound of upper gastrointestinal tract (N/A, 10/25/2023); and Esophagogastroduodenoscopy (N/A, 10/25/2023).    Family History: family history includes Alcohol abuse in her brother, brother, and brother; Arthritis in her brother, brother, and daughter; Asthma in her daughter and daughter; Bladder Cancer in her mother; Breast cancer (age of onset: 79) in her sister; Cataracts in her mother; Colon cancer in her father; Colon cancer (age of onset: 70) in her brother; Depression in her daughter, daughter, and daughter; Glaucoma in her mother; Heart disease in her mother; Heart disease (age of onset: 50) in her father; Hyperlipidemia in her mother; Hypertension in her daughter; Kidney disease in her mother; Parkinsonism in her brother; Stroke in her daughter; Stroke (age of onset: 40) in her daughter.. Otherwise no colon cancer, inflammatory bowel disease, or GI malignancies.    Social History:  reports that she has never smoked. She has never used smokeless tobacco. She reports that she does not currently use alcohol. She reports that she does not use drugs.    Review of patient's allergies indicates:   Allergen Reactions    Codeine Itching and Nausea And Vomiting    Dilaudid [hydromorphone (bulk)] Other (See Comments)     Oversedating, head burning. Pt prefers to avoid.       Percocet [oxycodone-acetaminophen] Itching    Sulfa (sulfonamide antibiotics) Itching and Nausea And Vomiting           Latex, natural rubber Rash       Medications:   Medications Prior to Admission   Medication Sig Dispense Refill Last Dose     ascorbic acid, vitamin C, (VITAMIN C) 1000 MG tablet Take 1,000 mg by mouth once daily.   11/14/2023    atorvastatin (LIPITOR) 40 MG tablet Take 40 mg by mouth once daily.   11/14/2023    biotin 10,000 mcg TbDL Take 1 tablet by mouth once daily.    11/14/2023    calcium-vitamin D3 (OS-KASSANDRA 500 + D3) 500 mg-5 mcg (200 unit) per tablet Take 1 tablet by mouth once daily.   11/14/2023    cranberry extract 200 mg Cap Take 1 capsule by mouth once daily.   11/14/2023    donepeziL (ARICEPT) 10 MG tablet TAKE 1 TABLET(10 MG) BY MOUTH EVERY EVENING 90 tablet 3 11/14/2023    DULoxetine (CYMBALTA) 30 MG capsule TAKE 1 CAPSULE BY MOUTH TWICE  DAILY 180 capsule 3 11/14/2023    ferrous sulfate 324 mg (65 mg iron) TbEC Take 1 tablet (324 mg total) by mouth once daily.  0 11/14/2023    flash glucose scanning reader ([employer] EFREN 14 DAY READER) Misc Check blood glucose level 3 times daily. 1 each 0 11/14/2023    gabapentin (NEURONTIN) 300 MG capsule Take 300 mg by mouth 3 (three) times daily.   11/14/2023    levothyroxine (SYNTHROID) 75 MCG tablet TAKE 1 TABLET(75 MCG) BY MOUTH BEFORE BREAKFAST 90 tablet 3 11/14/2023    linaGLIPtin (TRADJENTA) 5 mg Tab tablet Take 1 tablet (5 mg total) by mouth once daily. 90 tablet 3 11/14/2023    magnesium 30 mg Tab Take 30 mg by mouth once. Patient does not know actual dose in MG   11/14/2023    metFORMIN (GLUCOPHAGE) 500 MG tablet Take 1 tablet (500 mg total) by mouth 2 (two) times daily with meals. 180 tablet 3 11/14/2023    pantoprazole (PROTONIX) 20 MG tablet TAKE 1 TABLET(20 MG) BY MOUTH TWICE DAILY 180 tablet 1 11/14/2023    traZODone (DESYREL) 50 MG tablet Take 1 tablet (50 mg total) by mouth every evening. 90 tablet 1 11/14/2023    valACYclovir (VALTREX) 1000 MG tablet TAKE 1 TABLET(1000 MG) BY MOUTH EVERY DAY 30 tablet 2 11/14/2023    acetaminophen (TYLENOL) 650 MG TbSR Take 1,300 mg by mouth 2 (two) times a day.       conjugated estrogens (PREMARIN) vaginal cream INSERT 0.5 GRAM  VAGINALLY 2 TIMES A WEEK 30 g 3     flash glucose sensor (FREESTYLE EFREN 14 DAY SENSOR) Kit 1 application by Misc.(Non-Drug; Combo Route) route every 14 (fourteen) days. Disp 30 or 90 day refill 6 kit 3     HYDROcodone-acetaminophen (NORCO) 5-325 mg per tablet Take 1 tablet by mouth every 6 (six) hours as needed for Pain. 28 tablet 0     hydrocortisone 2.5 % cream Apply topically 2 (two) times daily as needed. 1 each 1     lancets (FREESTYLE LANCETS) 28 gauge Misc 30 lancets by Misc.(Non-Drug; Combo Route) route Daily. 30 each 3     levothyroxine (SYNTHROID) 75 MCG tablet Take 1 tablet (75 mcg total) by mouth before breakfast. 90 tablet 3     LIDOcaine (LIDODERM) 5 % APPLY 1 TO 3 PATCHES TO BACK DAILY. REMOVE WITHIN 12 HOURS 30 patch 2     mometasone (ELOCON) 0.1 % ointment Apply topically 2 (two) times a day. Mix 50/50 with mupirocin ointment. Apply in each nostril twice daily. 15 g 1     montelukast (SINGULAIR) 10 mg tablet TAKE 1 TABLET BY MOUTH  DAILY 90 tablet 3     ondansetron (ZOFRAN-ODT) 8 MG TbDL Take 1 tablet (8 mg total) by mouth every 8 (eight) hours as needed (nausea). 30 tablet 0     polyethylene glycol (GLYCOLAX) 17 gram PwPk Take 17 g by mouth once daily. 90 each 3     ubrogepant (UBRELVY) 100 mg tablet Take 1 tablet daily as needed for migraine headache. May take 1 additional tablet 2 hours later if needed. 16 tablet 2        Physical Exam:    Vital Signs:   Vitals:    11/15/23 0900   BP: (!) 154/66   Pulse: 69   Resp: 14   Temp: 97.7 °F (36.5 °C)       General Appearance: Well appearing in no acute distress  Eyes:    No scleral icterus  ENT: Neck supple, Lips, mucosa, and tongue normal; teeth and gums normal  Abdomen: Soft, non tender, non distended with normal bowel sounds. No hepatosplenomegaly, ascites, or mass.  Extremities: No edema  Skin: No rash    Labs:  Lab Results   Component Value Date    WBC 4.50 09/30/2023    HGB 11.0 (L) 09/30/2023    HCT 32.3 (L) 09/30/2023    PLT 72 (L) 09/30/2023     CHOL 150 08/04/2020    TRIG 128 11/29/2022    HDL 71 08/04/2020    ALT 31 11/03/2023    AST 38 11/03/2023     09/30/2023    K 3.3 (L) 09/30/2023     09/30/2023    CREATININE 0.7 09/30/2023    BUN 8 09/30/2023    CO2 25 09/30/2023    TSH 6.050 (H) 06/06/2023    INR 1.0 11/03/2023    GLUF 148 (H) 08/15/2006    HGBA1C 6.6 (H) 09/27/2023       I have explained the risks and benefits of endoscopy procedures to the patient including but not limited to bleeding, perforation, infection, and death.  The patient was asked if they understand and allowed to ask any further questions to their satisfaction.      Fan Bingham MD

## 2023-11-15 NOTE — ANESTHESIA PREPROCEDURE EVALUATION
11/15/2023  Anayeli Judd is a 78 y.o., female Pre-operative evaluation for Procedure(s) (LRB):  ESOPHAGOGASTRODUODENOSCOPY (EGD) (N/A)    Anayeli Judd is a 78 y.o. female     Patient Active Problem List   Diagnosis    Lumbar spondylosis    Cervical spondylosis with radiculopathy    Malignant neoplasm of colon, unspecified part of colon    Insomnia    Carpal tunnel syndrome    Headache(784.0)    Acquired hypothyroidism    History of colon cancer    Irritable bowel syndrome with both constipation and diarrhea    Chronic sinusitis of right maxilla    Diarrhea    Pelvic muscle wasting    GERD (gastroesophageal reflux disease)    Fatty liver    Partial small bowel obstruction    Family history of breast cancer    Gait instability    Type 2 diabetes mellitus without complication, without long-term current use of insulin    Acute deep vein thrombosis (DVT) of upper extremity    Wound infection after surgery    Chronic diarrhea    Fecal incontinence    Generalized abdominal pain    Vitamin B12 deficiency    Vaginal atrophy    Incomplete emptying of bladder    Mixed stress and urge urinary incontinence    Vaginal irritation    Fecal soiling    Irregular bowel habits    Elevated serum creatinine    Hypomagnesemia    Hyponatremia    Prolapse of female genital organs    Rectocele    Iron deficiency anemia due to chronic blood loss    Postmenopausal bleeding    SBO (small bowel obstruction)    Thrombocytopenia    Myelodysplastic syndrome    Small bowel obstruction due to adhesions    Constipation    Bilious vomiting with nausea    Parkinsonism    Memory change    Other chest pain    Atrophic pancreas    Other hyperlipidemia    Type 2 diabetes mellitus with diabetic peripheral angiopathy without gangrene, without long-term current use of insulin    Abdominal discomfort    Anxiety    Abnormal liver enzymes     Entrapment of intestine in abdominal adhesions    Peripheral edema    Chronic low back pain    Weakness    Periumbilical abdominal pain    Pulmonary emphysema, unspecified emphysema type    Portal hypertension       Review of patient's allergies indicates:   Allergen Reactions    Codeine Itching and Nausea And Vomiting    Dilaudid [hydromorphone (bulk)] Other (See Comments)     Oversedating, head burning. Pt prefers to avoid.       Percocet [oxycodone-acetaminophen] Itching    Sulfa (sulfonamide antibiotics) Itching and Nausea And Vomiting           Latex, natural rubber Rash       No current facility-administered medications on file prior to encounter.     Current Outpatient Medications on File Prior to Encounter   Medication Sig Dispense Refill    acetaminophen (TYLENOL) 650 MG TbSR Take 1,300 mg by mouth 2 (two) times a day.      ascorbic acid, vitamin C, (VITAMIN C) 1000 MG tablet Take 1,000 mg by mouth once daily.      atorvastatin (LIPITOR) 40 MG tablet Take 40 mg by mouth once daily.      biotin 10,000 mcg TbDL Take 1 tablet by mouth once daily.       calcium-vitamin D3 (OS-KASSANDRA 500 + D3) 500 mg-5 mcg (200 unit) per tablet Take 1 tablet by mouth once daily.      conjugated estrogens (PREMARIN) vaginal cream INSERT 0.5 GRAM VAGINALLY 2 TIMES A WEEK 30 g 3    cranberry extract 200 mg Cap Take 1 capsule by mouth once daily.      donepeziL (ARICEPT) 10 MG tablet TAKE 1 TABLET(10 MG) BY MOUTH EVERY EVENING 90 tablet 3    DULoxetine (CYMBALTA) 30 MG capsule TAKE 1 CAPSULE BY MOUTH TWICE  DAILY 180 capsule 3    ferrous sulfate 324 mg (65 mg iron) TbEC Take 1 tablet (324 mg total) by mouth once daily.  0    flash glucose scanning reader (FREESTYLE EFREN 14 DAY READER) Misc Check blood glucose level 3 times daily. 1 each 0    flash glucose sensor (FREESTYLE EFREN 14 DAY SENSOR) Kit 1 application by Misc.(Non-Drug; Combo Route) route every 14 (fourteen) days. Disp 30 or 90 day refill 6 kit 3    gabapentin (NEURONTIN)  300 MG capsule Take 300 mg by mouth 3 (three) times daily.      HYDROcodone-acetaminophen (NORCO) 5-325 mg per tablet Take 1 tablet by mouth every 6 (six) hours as needed for Pain. 28 tablet 0    hydrocortisone 2.5 % cream Apply topically 2 (two) times daily as needed. 1 each 1    levothyroxine (SYNTHROID) 75 MCG tablet TAKE 1 TABLET(75 MCG) BY MOUTH BEFORE BREAKFAST 90 tablet 3    levothyroxine (SYNTHROID) 75 MCG tablet Take 1 tablet (75 mcg total) by mouth before breakfast. 90 tablet 3    LIDOcaine (LIDODERM) 5 % APPLY 1 TO 3 PATCHES TO BACK DAILY. REMOVE WITHIN 12 HOURS 30 patch 2    linaGLIPtin (TRADJENTA) 5 mg Tab tablet Take 1 tablet (5 mg total) by mouth once daily. 90 tablet 3    magnesium 30 mg Tab Take 30 mg by mouth once. Patient does not know actual dose in MG      metFORMIN (GLUCOPHAGE) 500 MG tablet Take 1 tablet (500 mg total) by mouth 2 (two) times daily with meals. 180 tablet 3    mometasone (ELOCON) 0.1 % ointment Apply topically 2 (two) times a day. Mix 50/50 with mupirocin ointment. Apply in each nostril twice daily. 15 g 1    montelukast (SINGULAIR) 10 mg tablet TAKE 1 TABLET BY MOUTH  DAILY 90 tablet 3    ondansetron (ZOFRAN-ODT) 8 MG TbDL Take 1 tablet (8 mg total) by mouth every 8 (eight) hours as needed (nausea). 30 tablet 0    pantoprazole (PROTONIX) 20 MG tablet TAKE 1 TABLET(20 MG) BY MOUTH TWICE DAILY 180 tablet 1    polyethylene glycol (GLYCOLAX) 17 gram PwPk Take 17 g by mouth once daily. 90 each 3    traZODone (DESYREL) 50 MG tablet Take 1 tablet (50 mg total) by mouth every evening. 90 tablet 1    ubrogepant (UBRELVY) 100 mg tablet Take 1 tablet daily as needed for migraine headache. May take 1 additional tablet 2 hours later if needed. 16 tablet 2    valACYclovir (VALTREX) 1000 MG tablet TAKE 1 TABLET(1000 MG) BY MOUTH EVERY DAY 30 tablet 2    [DISCONTINUED] hyoscyamine (ANASPAZ,LEVSIN) 0.125 mg Tab TAKE 1 TABLET(125 MCG) BY MOUTH EVERY 8 HOURS AS NEEDED FOR URGENCY 270 tablet 3        Past Surgical History:   Procedure Laterality Date    ADENOIDECTOMY      APPENDECTOMY      BACK SURGERY      BREAST BIOPSY  1970    BREAST CYST EXCISION  1970?    BREAST LUMPECTOMY  1970    CARPAL TUNNEL RELEASE      bilateral      SECTION      CHOLECYSTECTOMY  1965    COLECTOMY  2011    Transverse colon resection by Dr. Aguirre    COLONOSCOPY N/A 2017    Procedure: COLONOSCOPY;  Surgeon: Manjit Alvarez MD;  Location: [facility] (4TH FLR);  Service: Endoscopy;  Laterality: N/A;    COLONOSCOPY N/A 2019    Procedure: COLONOSCOPY;  Surgeon: Ramiro Jefferson MD;  Location: [facility] (4TH FLR);  Service: Endoscopy;  Laterality: N/A;  PM prep    COLONOSCOPY N/A 2022    Procedure: COLONOSCOPY;  Surgeon: Ramiro Jefferson MD;  Location: [facility] (2ND FLR);  Service: Endoscopy;  Laterality: N/A;  EGD and colonoscopy in 6-8 weeks from now     states has constipation. -extended Golytely prep    CYSTOSCOPY N/A 2021    Procedure: CYSTOSCOPY;  Surgeon: Viridiana Valenzuela MD;  Location: [facility] 1ST FLR;  Service: Urology;  Laterality: N/A;    ENDOSCOPIC ULTRASOUND OF UPPER GASTROINTESTINAL TRACT N/A 2018    Procedure: ULTRASOUND, UPPER GI TRACT, ENDOSCOPIC;  Surgeon: Jose Hess MD;  Location: [facility];  Service: Endoscopy;  Laterality: N/A;    ENDOSCOPIC ULTRASOUND OF UPPER GASTROINTESTINAL TRACT N/A 2019    Procedure: ULTRASOUND, UPPER GI TRACT, ENDOSCOPIC;  Surgeon: Jose Hess MD;  Location: [facility] (2ND FLR);  Service: Endoscopy;  Laterality: N/A;    ENDOSCOPIC ULTRASOUND OF UPPER GASTROINTESTINAL TRACT N/A 10/25/2023    Procedure: ULTRASOUND, UPPER GI TRACT, ENDOSCOPIC;  Surgeon: Jose Campos MD;  Location: [facility];  Service: Endoscopy;  Laterality: N/A;  10/20/23: instructions sent via portal-GD    ESOPHAGOGASTRODUODENOSCOPY N/A 2018    Procedure: EGD (ESOPHAGOGASTRODUODENOSCOPY);  Surgeon: Angelo Reynolds MD;  Location: [facility] (2ND  FLR);  Service: Endoscopy;  Laterality: N/A;    ESOPHAGOGASTRODUODENOSCOPY N/A 06/26/2019    Procedure: EGD (ESOPHAGOGASTRODUODENOSCOPY);  Surgeon: Ramiro Jefferson MD;  Location: [facility] (4TH FLR);  Service: Endoscopy;  Laterality: N/A;    ESOPHAGOGASTRODUODENOSCOPY N/A 05/04/2022    Procedure: EGD (ESOPHAGOGASTRODUODENOSCOPY);  Surgeon: Ramiro Jefferson MD;  Location: [facility] (2ND FLR);  Service: Endoscopy;  Laterality: N/A;  fully vaccinated-GT    ESOPHAGOGASTRODUODENOSCOPY N/A 10/25/2023    Procedure: EGD (ESOPHAGOGASTRODUODENOSCOPY);  Surgeon: Jose Campos MD;  Location: [facility];  Service: Endoscopy;  Laterality: N/A;    EYE SURGERY      Cataract Removal    FLUOROSCOPIC URODYNAMIC STUDY N/A 11/09/2021    Procedure: URODYNAMIC STUDY, FLUOROSCOPIC;  Surgeon: Viridiana Valenzuela MD;  Location: [facility] OR 1ST FLR;  Service: Urology;  Laterality: N/A;  90 minutes     HYSTERECTOMY      JOINT REPLACEMENT      OOPHORECTOMY  1970    posterolateral fusion with autograft bone and Benson mineralized bone matrix  02/01/2013    at [facility] lumbar spine stenosis    SMALL INTESTINE SURGERY      SPINE SURGERY      TOE SURGERY      TONSILLECTOMY      TRANSFORAMINAL EPIDURAL INJECTION OF STEROID Bilateral 12/21/2022    Procedure: Injection,steroid,epidural, Todd L5/S1 TF CONTRAST DIRECT REFERRAL;  Surgeon: Toni Osorio MD;  Location: [facility] [place] [facility];  Service: Pain Management;  Laterality: Bilateral;    TRIGGER FINGER RELEASE           Pre-op Assessment    I have reviewed the Patient Summary Reports.     I have reviewed the Nursing Notes. I have reviewed the NPO Status.   I have reviewed the Medications.     Review of Systems  Anesthesia Hx:  No problems with previous Anesthesia   History of prior surgery of interest to airway management or planning:          Denies Family Hx of Anesthesia complications.    Denies Personal Hx of Anesthesia complications.                    Social:  No Alcohol Use, Non-Smoker        Hematology/Oncology:  Hematology Normal   Oncology Normal                                   EENT/Dental:  EENT/Dental Normal           Cardiovascular:  Cardiovascular Normal Exercise tolerance: good                                           Pulmonary:   COPD                     Renal/:  Renal/ Normal                 Hepatic/GI:     GERD Liver Disease,            Musculoskeletal:  Arthritis          Spine Disorders: cervical and lumbar Degenerative disease           Neurological:      Headaches                                 Endocrine:  Diabetes, type 2 Hypothyroidism          Psych:  Psychiatric Normal                    Physical Exam  General: Well nourished, Cooperative, Alert and Oriented    Airway:  Mallampati: III / II  Mouth Opening: Normal  TM Distance: Normal  Tongue: Normal  Neck ROM: Normal ROM    Dental:  Intact    Chest/Lungs:  Clear to auscultation, Normal Respiratory Rate    Heart:  Rate: Normal  Rhythm: Regular Rhythm        Anesthesia Plan  Type of Anesthesia, risks & benefits discussed:    Anesthesia Type: Gen Natural Airway  Intra-op Monitoring Plan: Standard ASA Monitors  Post Op Pain Control Plan: multimodal analgesia and IV/PO Opioids PRN  Induction:  IV  Airway Plan: Direct  Informed Consent: Informed consent signed with the Patient and all parties understand the risks and agree with anesthesia plan.  All questions answered. Patient consented to blood products? No  ASA Score: 3  Day of Surgery Review of History & Physical: H&P Update referred to the surgeon/provider.    Ready For Surgery From Anesthesia Perspective.     .

## 2023-11-15 NOTE — PROVATION PATIENT INSTRUCTIONS
Discharge Summary/Instructions after an Endoscopic Procedure  Patient Name: Anayeli Judd  Patient MRN: [id number]  Patient YOB: 1945     Wednesday, November 15, 2023  Db Sanchez MD  Dear patient,  As a result of recent federal legislation (The Federal Cures Act), you may   receive lab or pathology results from your procedure in your [facility]   account before your physician is able to contact you. Your physician or   their representative will relay the results to you with their   recommendations at their soonest availability.  Thank you,  RESTRICTIONS:  During your procedure today, you received medications for sedation.  These   medications may affect your judgment, balance and coordination.  Therefore,   for 24 hours, you have the following restrictions:   - DO NOT drive a car, operate machinery, make legal/financial decisions,   sign important papers or drink alcohol.    ACTIVITY:  Today: no heavy lifting, straining or running due to procedural   sedation/anesthesia.  The following day: return to full activity including work.  DIET:  Eat and drink normally unless instructed otherwise.     TREATMENT FOR COMMON SIDE EFFECTS:  - Mild abdominal pain, nausea, belching, bloating or excessive gas:  rest,   eat lightly and use a heating pad.  - Sore Throat: treat with throat lozenges and/or gargle with warm salt   water.  - Because air was used during the procedure, expelling large amounts of air   from your rectum or belching is normal.  - If a bowel prep was taken, you may not have a bowel movement for 1-3 days.    This is normal.  SYMPTOMS TO WATCH FOR AND REPORT TO YOUR PHYSICIAN:  1. Abdominal pain or bloating, other than gas cramps.  2. Chest pain.  3. Back pain.  4. Signs of infection such as: chills or fever occurring within 24 hours   after the procedure.  5. Rectal bleeding, which would show as bright red, maroon, or black stools.   (A tablespoon of blood from the rectum is not serious, especially  if   hemorrhoids are present.)  6. Vomiting.  7. Weakness or dizziness.  GO DIRECTLY TO THE NEAREST EMERGENCY ROOM IF YOU HAVE ANY OF THE FOLLOWING:      Difficulty breathing              Chills and/or fever over 101 F   Persistent vomiting and/or vomiting blood   Severe abdominal pain   Severe chest pain   Black, tarry stools   Bleeding- more than one tablespoon   Any other symptom or condition that you feel may need urgent attention  Your doctor recommends these additional instructions:  If any biopsies were taken, your doctors clinic will contact you in 1 to 2   weeks with any results.  - Discharge patient to home.   - Resume previous diet today.   - Continue present medications.   - Return to liver clinic as previously scheduled.   - Banding not successful due to inability to intubate the esophagus with the    on the scope. No obvious stenosis or stricture noted that would be   amenable to dilation.  For questions, problems or results please call your physician - Db Sanchez MD at Work:  [phone].  [facility] [place] [facility], EMERGENCY ROOM PHONE NUMBER: [phone]  IF A COMPLICATION OR EMERGENCY SITUATION ARISES AND YOU ARE UNABLE TO REACH   YOUR PHYSICIAN - GO DIRECTLY TO THE EMERGENCY ROOM.  Db Sanchez MD  11/15/2023 10:22:16 AM  This report has been verified and signed electronically.  Dear patient,  As a result of recent federal legislation (The Federal Cures Act), you may   receive lab or pathology results from your procedure in your [facility]   account before your physician is able to contact you. Your physician or   their representative will relay the results to you with their   recommendations at their soonest availability.  Thank you,  PROVATION

## 2023-11-20 ENCOUNTER — EXTERNAL HOME HEALTH (OUTPATIENT)
Dept: HOME HEALTH SERVICES | Facility: HOSPITAL | Age: 78
End: 2023-11-20
Payer: MEDICARE

## 2023-11-27 ENCOUNTER — TELEPHONE (OUTPATIENT)
Dept: HEPATOLOGY | Facility: CLINIC | Age: 78
End: 2023-11-27
Payer: MEDICARE

## 2023-11-27 NOTE — TELEPHONE ENCOUNTER
----- Message from Mariia Farah sent at 11/27/2023  2:18 PM CST -----  Regarding: Update      Name Of Caller:   Ms Ramirez w/ [facility]      Contact Preference:   June (Patient) @ [phone]       Nature of call:   Ms Ramirez wanted to inform a nurse that the pt is experiencing abdomen pain, gas, and diarrhea. Please contact the pt to assist further.

## 2023-11-30 ENCOUNTER — TELEPHONE (OUTPATIENT)
Dept: INTERNAL MEDICINE | Facility: CLINIC | Age: 78
End: 2023-11-30
Payer: MEDICARE

## 2023-11-30 DIAGNOSIS — E11.9 TYPE 2 DIABETES MELLITUS WITHOUT COMPLICATION, WITHOUT LONG-TERM CURRENT USE OF INSULIN: ICD-10-CM

## 2023-11-30 PROCEDURE — G0179 MD RECERTIFICATION HHA PT: HCPCS | Mod: ,,, | Performed by: STUDENT IN AN ORGANIZED HEALTH CARE EDUCATION/TRAINING PROGRAM

## 2023-11-30 RX ORDER — FLASH GLUCOSE SCANNING READER
EACH MISCELLANEOUS
Qty: 1 EACH | Refills: 0 | Status: SHIPPED | OUTPATIENT
Start: 2023-11-30

## 2023-11-30 RX ORDER — FLASH GLUCOSE SENSOR
1 KIT MISCELLANEOUS
Qty: 6 KIT | Refills: 3 | Status: SHIPPED | OUTPATIENT
Start: 2023-11-30

## 2023-11-30 NOTE — TELEPHONE ENCOUNTER
----- Message from Sarahy Clark LPN sent at 11/30/2023 10:22 AM CST -----  Contact: Shai argueta/[facility]  [phone]    ----- Message -----  From: eJlena Billings  Sent: 11/30/2023  10:19 AM CST  To: Jerri Bejarano Staff    Shai is requesting refills on pt's diabetic supplies.  Per Shai, pt needs a machine as well.                     Thank you

## 2023-12-04 ENCOUNTER — CARE AT HOME (OUTPATIENT)
Dept: HOME HEALTH SERVICES | Facility: CLINIC | Age: 78
End: 2023-12-04
Payer: MEDICARE

## 2023-12-04 DIAGNOSIS — G20.C PARKINSONISM, UNSPECIFIED PARKINSONISM TYPE: Primary | ICD-10-CM

## 2023-12-04 PROCEDURE — 99349 PR HOME VISIT,ESTAB PATIENT,LEVEL III: ICD-10-PCS | Mod: S$GLB,,, | Performed by: NURSE PRACTITIONER

## 2023-12-04 PROCEDURE — 99349 HOME/RES VST EST MOD MDM 40: CPT | Mod: S$GLB,,, | Performed by: NURSE PRACTITIONER

## 2023-12-04 NOTE — PROGRESS NOTES
"[facility]  Medical Care Home Visit    Visit Date: 12/04/23  Encounter Provider: Elissa Canseco NP  PCP:  Darci Guthrie MD    PRESENTING HISTORY      Patient ID: Anayeli Judd 78 y.o. female.    Consult Requested By:  No ref. provider found  Reason for Consult:  Low grade fever, weakness     Chief Complaint: Low grade fever, weakness    The patient is being seen at home due to a physical debility that presents a taxing effort to leave the home, to mitigate high risk of hospital readmission or due to the limited availability of reliable or safe options for transportation to the point of access to the provider. The visit meets the criteria for medical necessity as defined by CMS as "health-care services needed to prevent, diagnose, or treat an illness, injury, condition, disease, or its symptoms and that meet accepted standards of medicine." Prior to treatment on this visit the chart was reviewed and patient consent was obtained.    HPI: Anayeli Judd is an 78 y.o. year old female hx of DM, colon cancer(s/p transverse colectomy), UE DVT(no blood thinners), recurrent episodes of abdominal pain. She follow with GI Dr. Jefferson, PCP Dr. Guthrie, Neurology and Hematology.     Today:  Ms. Anayeli Judd is a 78 y.o. female is being seen and examined at home for follow up home based care.  Dr. Guthrie is her PCP.  She was recently hospitalization from [id number] with a small bowel obstructions. She denies abdominal pain, chest pain, chills, nausea, cough, congestion, change in bladder habits, change in bowel habits.   She reports taking meds as prescribed.  She also reports associated weakness. Her appetite is decreased since her hospitalization, BM pattern wnl, sleep pattern wnl. She has private caregivers in home that assist with ADLs.  She is aware of upcoming scheduled appointments. No other needs identified at this time. Risks of environmental exposure to coronavirus discussed including: social " distancing, hand hygiene, and limiting departures from the home for necessities only.  Reports understanding and willingness to comply.     Attestation: Screening criteria to assess the level of the patient's risk for infection with COVID-19 as recommended by the CDC at the time of the above documented home visit concluded appropriateness to proceed. Universal precautions were maintained at all times, including provider use of >60% alcohol gel hand  immediately prior to entry and upon departing the patient's home as well as cleaning of equipment used in home visit with antibacterial/germicidal disposable wipes.      ________________________________________________________________    Review of Systems   Constitutional:  Negative for chills and fever (low grade).   HENT:  Negative for congestion.    Eyes:  Negative for visual disturbance.   Respiratory:  Negative for chest tightness and shortness of breath.    Cardiovascular:  Negative for chest pain, palpitations and leg swelling.   Gastrointestinal:  Negative for abdominal pain, constipation, diarrhea, nausea and vomiting.   Genitourinary:  Negative for difficulty urinating.   Musculoskeletal:  Negative for arthralgias and myalgias.   Skin:  Negative for wound.   Neurological:  Positive for weakness. Negative for dizziness.   Hematological:  Does not bruise/bleed easily.   Psychiatric/Behavioral:  Negative for agitation.      Assessments:  Environmental: 7th floor condo, no steps to enter, adequate lighting and temperature control  Functional Status: Assistance with ADL's/IADL's, ambulates with assistance of a cane/walker, continent of bowel and bladder  Safety: Fall Precautions, COVID Precautions/Social Distancing/Mask Use  Nutritional: Adequate  Home Health: n/a  DME/Supplies: RW      PAST HISTORY:     Past Medical History:   Diagnosis Date    Abdominal pain     Allergic rhinitis     Arthritis     Blood platelet disorder     Blood platelet disorder      Blood transfusion     during delivery and     Bowel obstruction     Cervical radiculopathy     followed by dr cloud    Colon cancer     transverse colon; resected; Stage IIA (pT3 pN0 MX)    Degenerative joint disease (DJD) of lumbar spine     Diabetes mellitus     Diarrhea     Falls 2020    Family history of breast cancer     Family history of colon cancer     Fatty liver     GERD (gastroesophageal reflux disease)     History of shingles     Hyperlipidemia     Hypomagnesemia     Hypothyroidism     Irritable bowel syndrome     Microscopic colitis     treated     Migraine     Myelodysplastic syndrome     Raynaud phenomenon     Raynaud's disease     Squamous cell carcinoma of skin     Type 2 diabetes mellitus     Usual hyperplasia of lactiferous duct        Past Surgical History:   Procedure Laterality Date    ADENOIDECTOMY      APPENDECTOMY      BACK SURGERY      BREAST BIOPSY  1970    BREAST CYST EXCISION  1970?    BREAST LUMPECTOMY  1970    CARPAL TUNNEL RELEASE      bilateral      SECTION      CHOLECYSTECTOMY  1965    COLECTOMY  2011    Transverse colon resection by Dr. Aguirre    COLONOSCOPY N/A 2017    Procedure: COLONOSCOPY;  Surgeon: Manjit Alvarez MD;  Location: [facility] (4TH FLR);  Service: Endoscopy;  Laterality: N/A;    COLONOSCOPY N/A 2019    Procedure: COLONOSCOPY;  Surgeon: Ramiro Jefferson MD;  Location: [facility] (4TH FLR);  Service: Endoscopy;  Laterality: N/A;  PM prep    COLONOSCOPY N/A 2022    Procedure: COLONOSCOPY;  Surgeon: Ramiro Jefferson MD;  Location: [facility] (2ND FLR);  Service: Endoscopy;  Laterality: N/A;  EGD and colonoscopy in 6-8 weeks from now     states has constipation. -extended Golytely prep    CYSTOSCOPY N/A 2021    Procedure: CYSTOSCOPY;  Surgeon: Viridiana Valenzuela MD;  [address];  Service: Urology;  Laterality: N/A;    ENDOSCOPIC ULTRASOUND OF UPPER GASTROINTESTINAL TRACT N/A 2018     Procedure: ULTRASOUND, UPPER GI TRACT, ENDOSCOPIC;  Surgeon: Jose Hess MD;  Location: [facility] ENDO;  Service: Endoscopy;  Laterality: N/A;    ENDOSCOPIC ULTRASOUND OF UPPER GASTROINTESTINAL TRACT N/A 01/23/2019    Procedure: ULTRASOUND, UPPER GI TRACT, ENDOSCOPIC;  Surgeon: Jose Hess MD;  Location: [facility] (2ND FLR);  Service: Endoscopy;  Laterality: N/A;    ENDOSCOPIC ULTRASOUND OF UPPER GASTROINTESTINAL TRACT N/A 10/25/2023    Procedure: ULTRASOUND, UPPER GI TRACT, ENDOSCOPIC;  Surgeon: Jose Campos MD;  Location: [facility];  Service: Endoscopy;  Laterality: N/A;  10/20/23: instructions sent via portal-GD    ESOPHAGOGASTRODUODENOSCOPY N/A 11/16/2018    Procedure: EGD (ESOPHAGOGASTRODUODENOSCOPY);  Surgeon: Angelo Reynolds MD;  Location: [facility] (2ND FLR);  Service: Endoscopy;  Laterality: N/A;    ESOPHAGOGASTRODUODENOSCOPY N/A 06/26/2019    Procedure: EGD (ESOPHAGOGASTRODUODENOSCOPY);  Surgeon: Ramiro Jefferson MD;  Location: [facility] (4TH FLR);  Service: Endoscopy;  Laterality: N/A;    ESOPHAGOGASTRODUODENOSCOPY N/A 05/04/2022    Procedure: EGD (ESOPHAGOGASTRODUODENOSCOPY);  Surgeon: Ramiro Jefferson MD;  Location: [facility] (2ND FLR);  Service: Endoscopy;  Laterality: N/A;  fully vaccinated-GT    ESOPHAGOGASTRODUODENOSCOPY N/A 10/25/2023    Procedure: EGD (ESOPHAGOGASTRODUODENOSCOPY);  Surgeon: Jose Campos MD;  Location: [facility];  Service: Endoscopy;  Laterality: N/A;    ESOPHAGOGASTRODUODENOSCOPY N/A 11/15/2023    Procedure: ESOPHAGOGASTRODUODENOSCOPY (EGD);  Surgeon: Db Sanchez MD;  Location: [facility] (4TH FLR);  Service: Endoscopy;  Laterality: N/A;  New diagnosis of esophageal varices. Not a candidate for beta blocker due to soft blood pressures. Recommend EV banding.  PT/INR done on 11/3/23 and CBC done on 9/30/23  ok per Dr. Espinoza to be done by 1st available provider-see tele encounter-st  1    EYE SURGERY      Cataract Removal    FLUOROSCOPIC URODYNAMIC STUDY  N/A 11/09/2021    Procedure: URODYNAMIC STUDY, FLUOROSCOPIC;  Surgeon: Viridiana Valenzuela MD;  Location: [facility] OR [address] [place] [address];  Service: Urology;  Laterality: N/A;  90 minutes     HYSTERECTOMY      JOINT REPLACEMENT      OOPHORECTOMY  1970    posterolateral fusion with autograft bone and [place] mineralized bone matrix  02/01/2013    at [facility] for lumbar spine stenosis    SMALL INTESTINE SURGERY      SPINE SURGERY      TOE SURGERY      TONSILLECTOMY      TRANSFORAMINAL EPIDURAL INJECTION OF STEROID Bilateral 12/21/2022    Procedure: Injection,steroid,epidural, Todd L5/S1 TF CONTRAST DIRECT REFERRAL;  Surgeon: Toni Osorio MD;  Location: [facility];  Service: Pain Management;  Laterality: Bilateral;    TRIGGER FINGER RELEASE         Family History   Problem Relation Age of Onset    Heart disease Father 50        Mi age 50    Colon cancer Father     Bladder Cancer Mother         non smoker    Cataracts Mother     Glaucoma Mother     Heart disease Mother     Hyperlipidemia Mother     Kidney disease Mother     Breast cancer Sister 79    Arthritis Daughter     Asthma Daughter     Depression Daughter     Hypertension Daughter     Stroke Daughter 40    Arthritis Brother     Colon cancer Brother 70    Alcohol abuse Brother     Parkinsonism Brother     Alcohol abuse Brother     Depression Daughter     Stroke Daughter     Alcohol abuse Brother     Arthritis Brother     Asthma Daughter     Depression Daughter     Celiac disease Neg Hx     Cirrhosis Neg Hx     Colon polyps Neg Hx     Crohn's disease Neg Hx     Cystic fibrosis Neg Hx     Esophageal cancer Neg Hx     Hemochromatosis Neg Hx     Inflammatory bowel disease Neg Hx     Irritable bowel syndrome Neg Hx     Liver cancer Neg Hx     Liver disease Neg Hx     Rectal cancer Neg Hx     Stomach cancer Neg Hx     Ulcerative colitis Neg Hx     Eliazar's disease Neg Hx     Amblyopia Neg Hx     Blindness Neg Hx     Macular degeneration Neg Hx     Retinal detachment Neg Hx     Strabismus  Neg Hx     Melanoma Neg Hx        Social History     Socioeconomic History    Marital status:    Tobacco Use    Smoking status: Never    Smokeless tobacco: Never   Substance and Sexual Activity    Alcohol use: Not Currently     Comment: socially, rarely    Drug use: Never    Sexual activity: Not Currently   Social History Narrative    , lives alone.  Primary support are her children and friends.     Social Determinants of Health     Financial Resource Strain: Low Risk  (9/28/2023)    Overall Financial Resource Strain (CARDIA)     Difficulty of Paying Living Expenses: Not hard at all   Food Insecurity: No Food Insecurity (9/28/2023)    Hunger Vital Sign     Worried About Running Out of Food in the Last Year: Never true     Ran Out of Food in the Last Year: Never true   Transportation Needs: No Transportation Needs (9/28/2023)    PRAPARE - Transportation     Lack of Transportation (Medical): No     Lack of Transportation (Non-Medical): No   Physical Activity: Inactive (9/28/2023)    Exercise Vital Sign     Days of Exercise per Week: 0 days     Minutes of Exercise per Session: 0 min   Stress: No Stress Concern Present (7/8/2023)    [employer] - Occupational Stress Questionnaire     Feeling of Stress : Not at all   Social Connections: Unknown (9/28/2023)    Social Connection and Isolation Panel [NHANES]     Frequency of Communication with Friends and Family: More than three times a week     Frequency of Social Gatherings with Friends and Family: More than three times a week     Attends Muslim Services: Never     Active Member of Clubs or Organizations: No     Attends Club or Organization Meetings: Never     Marital Status: Patient refused   Housing Stability: Low Risk  (9/28/2023)    Housing Stability Vital Sign     Unable to Pay for Housing in the Last Year: No     Number of Places Lived in the Last Year: 1     Unstable Housing in the Last Year: No       MEDICATIONS &  ALLERGIES:     Current Outpatient Medications on File Prior to Visit   Medication Sig Dispense Refill    acetaminophen (TYLENOL) 650 MG TbSR Take 1,300 mg by mouth 2 (two) times a day.      ascorbic acid, vitamin C, (VITAMIN C) 1000 MG tablet Take 1,000 mg by mouth once daily.      atorvastatin (LIPITOR) 40 MG tablet Take 40 mg by mouth once daily.      biotin 10,000 mcg TbDL Take 1 tablet by mouth once daily.       calcium-vitamin D3 (OS-KASSANDRA 500 + D3) 500 mg-5 mcg (200 unit) per tablet Take 1 tablet by mouth once daily.      conjugated estrogens (PREMARIN) vaginal cream INSERT 0.5 GRAM VAGINALLY 2 TIMES A WEEK 30 g 3    cranberry extract 200 mg Cap Take 1 capsule by mouth once daily.      donepeziL (ARICEPT) 10 MG tablet TAKE 1 TABLET(10 MG) BY MOUTH EVERY EVENING 90 tablet 3    DULoxetine (CYMBALTA) 30 MG capsule TAKE 1 CAPSULE BY MOUTH TWICE  DAILY 180 capsule 3    ferrous sulfate 324 mg (65 mg iron) TbEC Take 1 tablet (324 mg total) by mouth once daily.  0    flash glucose scanning reader (FREESTYLE EFREN 14 DAY READER) Misc Check blood glucose level 3 times daily. 1 each 0    flash glucose sensor (FREESTYLE EFREN 14 DAY SENSOR) Kit 1 application  by Misc.(Non-Drug; Combo Route) route every 14 (fourteen) days. Disp 30 or 90 day refill 6 kit 3    gabapentin (NEURONTIN) 300 MG capsule Take 300 mg by mouth 3 (three) times daily.      HYDROcodone-acetaminophen (NORCO) 5-325 mg per tablet Take 1 tablet by mouth every 6 (six) hours as needed for Pain. 28 tablet 0    hydrocortisone 2.5 % cream Apply topically 2 (two) times daily as needed. 1 each 1    lancets (FREESTYLE LANCETS) 28 gauge Misc 30 lancets by Misc.(Non-Drug; Combo Route) route Daily. 30 each 3    levothyroxine (SYNTHROID) 75 MCG tablet TAKE 1 TABLET(75 MCG) BY MOUTH BEFORE BREAKFAST 90 tablet 3    levothyroxine (SYNTHROID) 75 MCG tablet Take 1 tablet (75 mcg total) by mouth before breakfast. 90 tablet 3    LIDOcaine (LIDODERM) 5 % APPLY 1 TO 3 PATCHES TO  BACK DAILY. REMOVE WITHIN 12 HOURS 30 patch 2    linaGLIPtin (TRADJENTA) 5 mg Tab tablet Take 1 tablet (5 mg total) by mouth once daily. 90 tablet 3    magnesium 30 mg Tab Take 30 mg by mouth once. Patient does not know actual dose in MG      metFORMIN (GLUCOPHAGE) 500 MG tablet Take 1 tablet (500 mg total) by mouth 2 (two) times daily with meals. 180 tablet 3    mometasone (ELOCON) 0.1 % ointment Apply topically 2 (two) times a day. Mix 50/50 with mupirocin ointment. Apply in each nostril twice daily. 15 g 1    montelukast (SINGULAIR) 10 mg tablet TAKE 1 TABLET BY MOUTH  DAILY 90 tablet 3    ondansetron (ZOFRAN-ODT) 8 MG TbDL Take 1 tablet (8 mg total) by mouth every 8 (eight) hours as needed (nausea). 30 tablet 0    pantoprazole (PROTONIX) 20 MG tablet TAKE 1 TABLET(20 MG) BY MOUTH TWICE DAILY 180 tablet 1    polyethylene glycol (GLYCOLAX) 17 gram PwPk Take 17 g by mouth once daily. 90 each 3    traZODone (DESYREL) 50 MG tablet Take 1 tablet (50 mg total) by mouth every evening. 90 tablet 1    ubrogepant (UBRELVY) 100 mg tablet Take 1 tablet daily as needed for migraine headache. May take 1 additional tablet 2 hours later if needed. 16 tablet 2    valACYclovir (VALTREX) 1000 MG tablet TAKE 1 TABLET(1000 MG) BY MOUTH EVERY DAY 30 tablet 2    [DISCONTINUED] hyoscyamine (ANASPAZ,LEVSIN) 0.125 mg Tab TAKE 1 TABLET(125 MCG) BY MOUTH EVERY 8 HOURS AS NEEDED FOR URGENCY 270 tablet 3     No current facility-administered medications on file prior to visit.        Review of patient's allergies indicates:   Allergen Reactions    Codeine Itching and Nausea And Vomiting    Dilaudid [hydromorphone (bulk)] Other (See Comments)     Oversedating, head burning. Pt prefers to avoid.       Percocet [oxycodone-acetaminophen] Itching    Sulfa (sulfonamide antibiotics) Itching and Nausea And Vomiting           Latex, natural rubber Rash       OBJECTIVE:     Vital Signs:  Vitals:    12/04/23 1005   BP: 116/67   Pulse: 70   Resp: 18  "  Temp: 97.5 °F (36.4 °C)     There is no height or weight on file to calculate BMI.       Physical Exam  Constitutional:       Appearance: Normal appearance.   HENT:      Head: Normocephalic and atraumatic.      Nose: No congestion or rhinorrhea.      Mouth/Throat:      Mouth: Mucous membranes are moist.   Cardiovascular:      Rate and Rhythm: Normal rate.   Pulmonary:      Effort: No respiratory distress.      Breath sounds: Normal breath sounds.   Abdominal:      General: Bowel sounds are normal.      Palpations: Abdomen is soft.   Musculoskeletal:      Cervical back: Normal range of motion and neck supple.      Right lower leg: No edema.      Left lower leg: No edema.   Skin:     General: Skin is warm and dry.   Neurological:      Mental Status: She is alert. Mental status is at baseline.   Psychiatric:         Mood and Affect: Mood normal.         Laboratory  Lab Results   Component Value Date    WBC 4.50 09/30/2023    HGB 11.0 (L) 09/30/2023    HCT 32.3 (L) 09/30/2023     (H) 09/30/2023    PLT 72 (L) 09/30/2023     Lab Results   Component Value Date    INR 1.0 11/03/2023    INR 1.0 08/20/2023    INR 1.1 04/03/2023     Lab Results   Component Value Date    HGBA1C 6.6 (H) 09/27/2023     No results for input(s): "POCTGLUCOSE" in the last 72 hours.       ASSESSMENT & PLAN:     Anayeli was seen today for follow-up.    Diagnoses and all orders for this visit:  Parkinsonism      Problem List Items Addressed This Visit        Parkinsonism - Primary  The current medical regimen is effective;  continue present plan and medications.           Neuro    Lumbar spondylosis    Cervical spondylosis with radiculopathy                     Pulmonary    Pulmonary emphysema, unspecified emphysema type       Oncology    Malignant neoplasm of colon, unspecified part of colon   Continue home PT  Follow up with GI as scheduled for [facility]  Continue home meds    Instructions:  - [place] Nurse Practitioner to schedule home follow-up " visit with patient in 4-6 weeks or as needed.  - Continue all medications, treatments and therapies as ordered.   - Follow all instructions, recommendations as discussed.  - Maintain Safety Precautions at all times.  - Attend all medical appointments as scheduled.  - For worsening symptoms: call Primary Care Physician or Nurse Practitioner.  - For emergencies, call 911 or immediately report to the nearest emergency room.  - Limit Risks of environmental exposure to coronavirus/COVID-19 as discussed including: social distancing, hand hygiene, and limiting departures from the home for necessities only.        Were controlled substances prescribed?  No      Scheduled Follow-up :  Future Appointments   Date Time Provider Department Center   2/7/2024  3:30 PM Jaqueline Espinoza MD [facility] HEPAT [facility]       After Visit Medication List :     Medication List            Accurate as of December 4, 2023 11:59 PM. If you have any questions, ask your nurse or doctor.                CONTINUE taking these medications      acetaminophen 650 MG Tbsr  Commonly known as: TYLENOL     ascorbic acid (vitamin C) 1000 MG tablet  Commonly known as: VITAMIN C     atorvastatin 40 MG tablet  Commonly known as: LIPITOR     biotin 10,000 mcg Tbdl     calcium-vitamin D3 500 mg-5 mcg (200 unit) per tablet  Commonly known as: OS-KASSANDRA 500 + D3     cranberry extract 200 mg Cap     donepeziL 10 MG tablet  Commonly known as: ARICEPT  TAKE 1 TABLET(10 MG) BY MOUTH EVERY EVENING     DULoxetine 30 MG capsule  Commonly known as: CYMBALTA  TAKE 1 CAPSULE BY MOUTH TWICE  DAILY     ferrous sulfate 324 mg (65 mg iron) Tbec  Take 1 tablet (324 mg total) by mouth once daily.     FREESTYLE EFREN 14 DAY READER Misc  Generic drug: flash glucose scanning reader  Check blood glucose level 3 times daily.     FREESTYLE EFREN 14 DAY SENSOR Kit  Generic drug: flash glucose sensor  1 application  by Misc.(Non-Drug; Combo Route) route every 14 (fourteen) days. Disp 30 or 90  day refill     gabapentin 300 MG capsule  Commonly known as: NEURONTIN     HYDROcodone-acetaminophen 5-325 mg per tablet  Commonly known as: NORCO  Take 1 tablet by mouth every 6 (six) hours as needed for Pain.     hydrocortisone 2.5 % cream  Apply topically 2 (two) times daily as needed.     lancets 28 gauge Misc  Commonly known as: FREESTYLE LANCETS  30 lancets by Misc.(Non-Drug; Combo Route) route Daily.     * levothyroxine 75 MCG tablet  Commonly known as: SYNTHROID  TAKE 1 TABLET(75 MCG) BY MOUTH BEFORE BREAKFAST     * levothyroxine 75 MCG tablet  Commonly known as: SYNTHROID  Take 1 tablet (75 mcg total) by mouth before breakfast.     LIDOcaine 5 %  Commonly known as: LIDODERM  APPLY 1 TO 3 PATCHES TO BACK DAILY. REMOVE WITHIN 12 HOURS     linaGLIPtin 5 mg Tab tablet  Commonly known as: TRADJENTA  Take 1 tablet (5 mg total) by mouth once daily.     magnesium 30 mg Tab     metFORMIN 500 MG tablet  Commonly known as: GLUCOPHAGE  Take 1 tablet (500 mg total) by mouth 2 (two) times daily with meals.     mometasone 0.1 % ointment  Commonly known as: ELOCON  Apply topically 2 (two) times a day. Mix 50/50 with mupirocin ointment. Apply in each nostril twice daily.     montelukast 10 mg tablet  Commonly known as: SINGULAIR  TAKE 1 TABLET BY MOUTH  DAILY     ondansetron 8 MG Tbdl  Commonly known as: ZOFRAN-ODT  Take 1 tablet (8 mg total) by mouth every 8 (eight) hours as needed (nausea).     pantoprazole 20 MG tablet  Commonly known as: PROTONIX  TAKE 1 TABLET(20 MG) BY MOUTH TWICE DAILY     polyethylene glycol 17 gram Pwpk  Commonly known as: GLYCOLAX  Take 17 g by mouth once daily.     PREMARIN vaginal cream  Generic drug: conjugated estrogens  INSERT 0.5 GRAM VAGINALLY 2 TIMES A WEEK     traZODone 50 MG tablet  Commonly known as: DESYREL  Take 1 tablet (50 mg total) by mouth every evening.     UBRELVY 100 mg tablet  Generic drug: ubrogepant  Take 1 tablet daily as needed for migraine headache. May take 1 additional  tablet 2 hours later if needed.     valACYclovir 1000 MG tablet  Commonly known as: VALTREX  TAKE 1 TABLET(1000 MG) BY MOUTH EVERY DAY           * This list has 2 medication(s) that are the same as other medications prescribed for you. Read the directions carefully, and ask your doctor or other care provider to review them with you.                  Patient consent was obtained prior to treatment on this visit.    Attestation: Screening criteria to assess the level of the patient's risk for infection with COVID-19 as recommended by the CDC at the time of the above documented home visit concluded appropriateness to proceed. Universal precautions were maintained at all times, including provider use of >60% alcohol gel hand  immediately prior to entry and upon departing the patient's home as well as cleaning of equipment used in home visit with antibacterial/germicidal disposable wipes.     Signature:    ROBIN Navarro-C   [facility]    Total Face-to-Face Encounter: 60 minutes with >50% of time spent discussing the care with the patient/family.

## 2023-12-06 VITALS
RESPIRATION RATE: 18 BRPM | SYSTOLIC BLOOD PRESSURE: 116 MMHG | TEMPERATURE: 98 F | DIASTOLIC BLOOD PRESSURE: 67 MMHG | HEART RATE: 70 BPM | OXYGEN SATURATION: 98 %

## 2023-12-19 ENCOUNTER — TELEPHONE (OUTPATIENT)
Dept: INTERNAL MEDICINE | Facility: CLINIC | Age: 78
End: 2023-12-19
Payer: MEDICARE

## 2023-12-19 DIAGNOSIS — E11.9 TYPE 2 DIABETES MELLITUS WITHOUT COMPLICATION, WITHOUT LONG-TERM CURRENT USE OF INSULIN: ICD-10-CM

## 2023-12-19 RX ORDER — INSULIN PUMP SYRINGE, 3 ML
EACH MISCELLANEOUS
Qty: 1 EACH | Refills: 0 | Status: SHIPPED | OUTPATIENT
Start: 2023-12-19 | End: 2024-12-18

## 2023-12-19 RX ORDER — GABAPENTIN 300 MG/1
CAPSULE ORAL
Qty: 180 CAPSULE | Refills: 5 | Status: SHIPPED | OUTPATIENT
Start: 2023-12-19

## 2023-12-19 NOTE — TELEPHONE ENCOUNTER
----- Message from Sarahy Clark LPN sent at 12/19/2023  3:27 PM CST -----  Good afternoon,    The patient called stating she needs a whole new Glucose machine with strips. She is also requesting something for pain. She states her neuropathy is causing her pain in both her feet.     Rossy

## 2023-12-19 NOTE — TELEPHONE ENCOUNTER
No care due was identified.  [facility] Embedded Care Due Messages. Reference number: [id number].   12/19/2023 4:02:28 PM CST

## 2023-12-28 ENCOUNTER — EXTERNAL HOME HEALTH (OUTPATIENT)
Dept: HOME HEALTH SERVICES | Facility: HOSPITAL | Age: 78
End: 2023-12-28
Payer: MEDICARE

## 2024-01-03 ENCOUNTER — PATIENT MESSAGE (OUTPATIENT)
Dept: INTERNAL MEDICINE | Facility: CLINIC | Age: 79
End: 2024-01-03
Payer: MEDICARE

## 2024-01-03 DIAGNOSIS — R19.7 DIARRHEA, UNSPECIFIED TYPE: Primary | ICD-10-CM

## 2024-01-03 DIAGNOSIS — Z71.89 COMPLEX CARE COORDINATION: ICD-10-CM

## 2024-01-03 RX ORDER — LOPERAMIDE HYDROCHLORIDE 2 MG/1
2 CAPSULE ORAL 2 TIMES DAILY PRN
Qty: 30 CAPSULE | Refills: 1 | Status: SHIPPED | OUTPATIENT
Start: 2024-01-03

## 2024-01-04 ENCOUNTER — TELEPHONE (OUTPATIENT)
Dept: INTERNAL MEDICINE | Facility: CLINIC | Age: 79
End: 2024-01-04
Payer: MEDICARE

## 2024-01-04 ENCOUNTER — TELEPHONE (OUTPATIENT)
Dept: AUDIOLOGY | Facility: CLINIC | Age: 79
End: 2024-01-04
Payer: MEDICARE

## 2024-01-04 NOTE — TELEPHONE ENCOUNTER
----- Message from Sarahy Clark LPN sent at 1/4/2024  4:06 PM CST -----  Regarding: Cipriano Kraft sent me an IM at 4:00 saying that the patients home health nurse Princess Domínguez asked if you can send in Bentyl for the patient.

## 2024-01-04 NOTE — TELEPHONE ENCOUNTER
Spoke with Danielle gruber. Has been having some recent diarrhea past week or 2. I am fine with the loperamide prn. Given the complicated GI history, discussed Bentyl effect and we agreed on avoiding that unless becomes clear to me more benefit than risk. Frequent use and potential paralytic ileus would be a major concern with regard to her SBO hx. Daughter will contact me if significant abdominal pain concerns. Chronically present but has not recently been anywhere near point of previous hospitalization/ER visits (partial SBO).

## 2024-01-11 ENCOUNTER — TELEPHONE (OUTPATIENT)
Dept: GASTROENTEROLOGY | Facility: CLINIC | Age: 79
End: 2024-01-11
Payer: MEDICARE

## 2024-01-11 NOTE — TELEPHONE ENCOUNTER
----- Message from Johanny Sheppard sent at 1/11/2024 10:26 AM CST -----  Regarding: returning call  Contact: [phone]  Who Called:Anayeli    Who Left Message for Patient:not sure    Does the patient know what this is regarding?:not sure    Would the patient rather a call back or a response via [facility]? Call back    Best Call Back Number:[phone]    Additional Information: pt stated she was told on the VM to call the office back

## 2024-01-12 ENCOUNTER — TELEPHONE (OUTPATIENT)
Dept: GASTROENTEROLOGY | Facility: CLINIC | Age: 79
End: 2024-01-12
Payer: MEDICARE

## 2024-01-12 ENCOUNTER — HOSPITAL ENCOUNTER (INPATIENT)
Facility: HOSPITAL | Age: 79
LOS: 1 days | Discharge: HOME HEALTH | DRG: 389 | End: 2024-01-14
Attending: EMERGENCY MEDICINE | Admitting: SURGERY
Payer: MEDICARE

## 2024-01-12 DIAGNOSIS — R11.2 NAUSEA & VOMITING: ICD-10-CM

## 2024-01-12 DIAGNOSIS — K56.609 SBO (SMALL BOWEL OBSTRUCTION): ICD-10-CM

## 2024-01-12 DIAGNOSIS — K56.600 PARTIAL BOWEL OBSTRUCTION: Primary | ICD-10-CM

## 2024-01-12 DIAGNOSIS — R09.02 HYPOXIA: ICD-10-CM

## 2024-01-12 LAB
ALBUMIN SERPL BCP-MCNC: 3.4 G/DL (ref 3.5–5.2)
ALP SERPL-CCNC: 87 U/L (ref 55–135)
ALT SERPL W/O P-5'-P-CCNC: 27 U/L (ref 10–44)
ANION GAP SERPL CALC-SCNC: 7 MMOL/L (ref 8–16)
AST SERPL-CCNC: 46 U/L (ref 10–40)
BACTERIA #/AREA URNS AUTO: ABNORMAL /HPF
BASOPHILS # BLD AUTO: 0.02 K/UL (ref 0–0.2)
BASOPHILS NFR BLD: 0.3 % (ref 0–1.9)
BILIRUB SERPL-MCNC: 0.8 MG/DL (ref 0.1–1)
BILIRUB UR QL STRIP: NEGATIVE
BUN SERPL-MCNC: 16 MG/DL (ref 8–23)
CALCIUM SERPL-MCNC: 9.8 MG/DL (ref 8.7–10.5)
CHLORIDE SERPL-SCNC: 107 MMOL/L (ref 95–110)
CLARITY UR REFRACT.AUTO: ABNORMAL
CO2 SERPL-SCNC: 22 MMOL/L (ref 23–29)
COLOR UR AUTO: YELLOW
CREAT SERPL-MCNC: 1 MG/DL (ref 0.5–1.4)
DIFFERENTIAL METHOD BLD: ABNORMAL
EOSINOPHIL # BLD AUTO: 0.2 K/UL (ref 0–0.5)
EOSINOPHIL NFR BLD: 2.3 % (ref 0–8)
ERYTHROCYTE [DISTWIDTH] IN BLOOD BY AUTOMATED COUNT: 13.4 % (ref 11.5–14.5)
EST. GFR  (NO RACE VARIABLE): 56.6 ML/MIN/1.73 M^2
GLUCOSE SERPL-MCNC: 149 MG/DL (ref 70–110)
GLUCOSE UR QL STRIP: NEGATIVE
HCT VFR BLD AUTO: 35.2 % (ref 37–48.5)
HGB BLD-MCNC: 11.9 G/DL (ref 12–16)
HGB UR QL STRIP: NEGATIVE
HYALINE CASTS UR QL AUTO: 7 /LPF
IMM GRANULOCYTES # BLD AUTO: 0.01 K/UL (ref 0–0.04)
IMM GRANULOCYTES NFR BLD AUTO: 0.1 % (ref 0–0.5)
INR PPP: 1 (ref 0.8–1.2)
KETONES UR QL STRIP: NEGATIVE
LEUKOCYTE ESTERASE UR QL STRIP: ABNORMAL
LIPASE SERPL-CCNC: 13 U/L (ref 4–60)
LYMPHOCYTES # BLD AUTO: 1.3 K/UL (ref 1–4.8)
LYMPHOCYTES NFR BLD: 19.4 % (ref 18–48)
MCH RBC QN AUTO: 35 PG (ref 27–31)
MCHC RBC AUTO-ENTMCNC: 33.8 G/DL (ref 32–36)
MCV RBC AUTO: 104 FL (ref 82–98)
MICROSCOPIC COMMENT: ABNORMAL
MONOCYTES # BLD AUTO: 0.6 K/UL (ref 0.3–1)
MONOCYTES NFR BLD: 9 % (ref 4–15)
NEUTROPHILS # BLD AUTO: 4.7 K/UL (ref 1.8–7.7)
NEUTROPHILS NFR BLD: 68.9 % (ref 38–73)
NITRITE UR QL STRIP: NEGATIVE
NON-SQ EPI CELLS #/AREA URNS AUTO: 1 /HPF
NRBC BLD-RTO: 0 /100 WBC
PH UR STRIP: 6 [PH] (ref 5–8)
PLATELET # BLD AUTO: 97 K/UL (ref 150–450)
PMV BLD AUTO: 10.6 FL (ref 9.2–12.9)
POTASSIUM SERPL-SCNC: 4.4 MMOL/L (ref 3.5–5.1)
PROT SERPL-MCNC: 7.2 G/DL (ref 6–8.4)
PROT UR QL STRIP: NEGATIVE
PROTHROMBIN TIME: 10.8 SEC (ref 9–12.5)
RBC # BLD AUTO: 3.4 M/UL (ref 4–5.4)
RBC #/AREA URNS AUTO: 2 /HPF (ref 0–4)
SODIUM SERPL-SCNC: 136 MMOL/L (ref 136–145)
SP GR UR STRIP: 1.02 (ref 1–1.03)
SQUAMOUS #/AREA URNS AUTO: 2 /HPF
TROPONIN I SERPL DL<=0.01 NG/ML-MCNC: <0.006 NG/ML (ref 0–0.03)
URN SPEC COLLECT METH UR: ABNORMAL
WBC # BLD AUTO: 6.87 K/UL (ref 3.9–12.7)
WBC #/AREA URNS AUTO: >100 /HPF (ref 0–5)
WBC CLUMPS UR QL AUTO: ABNORMAL

## 2024-01-12 PROCEDURE — 96375 TX/PRO/DX INJ NEW DRUG ADDON: CPT

## 2024-01-12 PROCEDURE — 83690 ASSAY OF LIPASE: CPT | Performed by: STUDENT IN AN ORGANIZED HEALTH CARE EDUCATION/TRAINING PROGRAM

## 2024-01-12 PROCEDURE — 93005 ELECTROCARDIOGRAM TRACING: CPT

## 2024-01-12 PROCEDURE — 85025 COMPLETE CBC W/AUTO DIFF WBC: CPT | Performed by: STUDENT IN AN ORGANIZED HEALTH CARE EDUCATION/TRAINING PROGRAM

## 2024-01-12 PROCEDURE — C9113 INJ PANTOPRAZOLE SODIUM, VIA: HCPCS | Performed by: EMERGENCY MEDICINE

## 2024-01-12 PROCEDURE — 80053 COMPREHEN METABOLIC PANEL: CPT | Performed by: STUDENT IN AN ORGANIZED HEALTH CARE EDUCATION/TRAINING PROGRAM

## 2024-01-12 PROCEDURE — 63600175 PHARM REV CODE 636 W HCPCS: Performed by: EMERGENCY MEDICINE

## 2024-01-12 PROCEDURE — 87086 URINE CULTURE/COLONY COUNT: CPT | Performed by: STUDENT IN AN ORGANIZED HEALTH CARE EDUCATION/TRAINING PROGRAM

## 2024-01-12 PROCEDURE — 99285 EMERGENCY DEPT VISIT HI MDM: CPT

## 2024-01-12 PROCEDURE — 87088 URINE BACTERIA CULTURE: CPT | Performed by: STUDENT IN AN ORGANIZED HEALTH CARE EDUCATION/TRAINING PROGRAM

## 2024-01-12 PROCEDURE — 81001 URINALYSIS AUTO W/SCOPE: CPT | Performed by: STUDENT IN AN ORGANIZED HEALTH CARE EDUCATION/TRAINING PROGRAM

## 2024-01-12 PROCEDURE — 84484 ASSAY OF TROPONIN QUANT: CPT | Performed by: EMERGENCY MEDICINE

## 2024-01-12 PROCEDURE — 87186 SC STD MICRODIL/AGAR DIL: CPT | Performed by: STUDENT IN AN ORGANIZED HEALTH CARE EDUCATION/TRAINING PROGRAM

## 2024-01-12 PROCEDURE — 87077 CULTURE AEROBIC IDENTIFY: CPT | Performed by: STUDENT IN AN ORGANIZED HEALTH CARE EDUCATION/TRAINING PROGRAM

## 2024-01-12 PROCEDURE — 93010 ELECTROCARDIOGRAM REPORT: CPT | Mod: ,,, | Performed by: INTERNAL MEDICINE

## 2024-01-12 PROCEDURE — 85610 PROTHROMBIN TIME: CPT | Performed by: EMERGENCY MEDICINE

## 2024-01-12 PROCEDURE — 86850 RBC ANTIBODY SCREEN: CPT | Performed by: EMERGENCY MEDICINE

## 2024-01-12 RX ORDER — MORPHINE SULFATE 4 MG/ML
4 INJECTION, SOLUTION INTRAMUSCULAR; INTRAVENOUS
Status: COMPLETED | OUTPATIENT
Start: 2024-01-12 | End: 2024-01-12

## 2024-01-12 RX ORDER — PANTOPRAZOLE SODIUM 40 MG/10ML
40 INJECTION, POWDER, LYOPHILIZED, FOR SOLUTION INTRAVENOUS
Status: COMPLETED | OUTPATIENT
Start: 2024-01-12 | End: 2024-01-12

## 2024-01-12 RX ADMIN — PANTOPRAZOLE SODIUM 40 MG: 40 INJECTION, POWDER, FOR SOLUTION INTRAVENOUS at 11:01

## 2024-01-12 RX ADMIN — MORPHINE SULFATE 4 MG: 4 INJECTION INTRAVENOUS at 10:01

## 2024-01-12 NOTE — TELEPHONE ENCOUNTER
----- Message from Vipin Page MA sent at 1/12/2024  8:08 AM CST -----  Regarding: returnin call  Contact: [phone]  Regarding: returning call  Contact: [phone]  Who Called:Anayeli     Who Left Message for  Lisa      Does the patient know what this is regarding?:not sure     Would the patient rather a call back or a response via [facility]? Call back     Best Call Back Number:[phone]     Additional Information: pt stated she was told on the VM to call the office back

## 2024-01-12 NOTE — Clinical Note
Diagnosis: Partial bowel obstruction [815902]   Future Attending Provider: JAROCHO PEACE [[id number]]   Reason for IP Medical Treatment  (Clinical interventions that can only be accomplished in the IP setting? ) :: pSBO requiring bowel rest, gastrograffin challenge   I certify that Inpatient services for greater than or equal to 2 midnights are medically necessary:: Yes   Plans for Post-Acute care--if anticipated (pick the single best option):: A. No post acute care anticipated at this time

## 2024-01-12 NOTE — TELEPHONE ENCOUNTER
Spoke with patient to let her know that Dr Jefferson did not call her earlier this week. Patient expressed understanding.

## 2024-01-13 LAB
ABO + RH BLD: NORMAL
BLD GP AB SCN CELLS X3 SERPL QL: NORMAL
SPECIMEN OUTDATE: NORMAL

## 2024-01-13 PROCEDURE — 25000003 PHARM REV CODE 250: Performed by: STUDENT IN AN ORGANIZED HEALTH CARE EDUCATION/TRAINING PROGRAM

## 2024-01-13 PROCEDURE — 96366 THER/PROPH/DIAG IV INF ADDON: CPT

## 2024-01-13 PROCEDURE — 25000003 PHARM REV CODE 250

## 2024-01-13 PROCEDURE — 63600175 PHARM REV CODE 636 W HCPCS: Performed by: EMERGENCY MEDICINE

## 2024-01-13 PROCEDURE — 20600001 HC STEP DOWN PRIVATE ROOM

## 2024-01-13 PROCEDURE — 25500020 PHARM REV CODE 255

## 2024-01-13 PROCEDURE — 96367 TX/PROPH/DG ADDL SEQ IV INF: CPT

## 2024-01-13 PROCEDURE — 25500020 PHARM REV CODE 255: Performed by: EMERGENCY MEDICINE

## 2024-01-13 PROCEDURE — 96365 THER/PROPH/DIAG IV INF INIT: CPT

## 2024-01-13 PROCEDURE — 25000003 PHARM REV CODE 250: Performed by: EMERGENCY MEDICINE

## 2024-01-13 PROCEDURE — 94761 N-INVAS EAR/PLS OXIMETRY MLT: CPT

## 2024-01-13 PROCEDURE — 63600175 PHARM REV CODE 636 W HCPCS: Performed by: STUDENT IN AN ORGANIZED HEALTH CARE EDUCATION/TRAINING PROGRAM

## 2024-01-13 RX ORDER — TALC
6 POWDER (GRAM) TOPICAL NIGHTLY PRN
Status: DISCONTINUED | OUTPATIENT
Start: 2024-01-13 | End: 2024-01-14 | Stop reason: HOSPADM

## 2024-01-13 RX ORDER — ACETAMINOPHEN 325 MG/1
650 TABLET ORAL EVERY 6 HOURS PRN
Status: DISCONTINUED | OUTPATIENT
Start: 2024-01-13 | End: 2024-01-14 | Stop reason: HOSPADM

## 2024-01-13 RX ORDER — PANTOPRAZOLE SODIUM 20 MG/1
20 TABLET, DELAYED RELEASE ORAL 2 TIMES DAILY
Status: DISCONTINUED | OUTPATIENT
Start: 2024-01-13 | End: 2024-01-14 | Stop reason: HOSPADM

## 2024-01-13 RX ORDER — PROMETHAZINE HYDROCHLORIDE 12.5 MG/1
25 TABLET ORAL EVERY 6 HOURS PRN
Status: DISCONTINUED | OUTPATIENT
Start: 2024-01-13 | End: 2024-01-14 | Stop reason: HOSPADM

## 2024-01-13 RX ORDER — DULOXETIN HYDROCHLORIDE 30 MG/1
30 CAPSULE, DELAYED RELEASE ORAL 2 TIMES DAILY
Status: DISCONTINUED | OUTPATIENT
Start: 2024-01-13 | End: 2024-01-14 | Stop reason: HOSPADM

## 2024-01-13 RX ORDER — ONDANSETRON 8 MG/1
8 TABLET, ORALLY DISINTEGRATING ORAL EVERY 8 HOURS PRN
Status: DISCONTINUED | OUTPATIENT
Start: 2024-01-13 | End: 2024-01-14 | Stop reason: HOSPADM

## 2024-01-13 RX ORDER — LEVOTHYROXINE SODIUM 75 UG/1
75 TABLET ORAL
Status: DISCONTINUED | OUTPATIENT
Start: 2024-01-13 | End: 2024-01-14 | Stop reason: HOSPADM

## 2024-01-13 RX ORDER — ENOXAPARIN SODIUM 100 MG/ML
40 INJECTION SUBCUTANEOUS EVERY 24 HOURS
Status: DISCONTINUED | OUTPATIENT
Start: 2024-01-13 | End: 2024-01-14 | Stop reason: HOSPADM

## 2024-01-13 RX ORDER — DEXTROSE MONOHYDRATE, SODIUM CHLORIDE, AND POTASSIUM CHLORIDE 50; 1.49; 4.5 G/1000ML; G/1000ML; G/1000ML
INJECTION, SOLUTION INTRAVENOUS CONTINUOUS
Status: DISCONTINUED | OUTPATIENT
Start: 2024-01-13 | End: 2024-01-14

## 2024-01-13 RX ORDER — DONEPEZIL HYDROCHLORIDE 5 MG/1
10 TABLET, FILM COATED ORAL NIGHTLY
Status: DISCONTINUED | OUTPATIENT
Start: 2024-01-13 | End: 2024-01-14 | Stop reason: HOSPADM

## 2024-01-13 RX ADMIN — DULOXETINE HYDROCHLORIDE 30 MG: 30 CAPSULE, DELAYED RELEASE ORAL at 08:01

## 2024-01-13 RX ADMIN — PROMETHAZINE HYDROCHLORIDE 25 MG: 25 TABLET ORAL at 12:01

## 2024-01-13 RX ADMIN — DIATRIZOATE MEGLUMINE AND DIATRIZOATE SODIUM 100 ML: 660; 100 LIQUID ORAL; RECTAL at 10:01

## 2024-01-13 RX ADMIN — ACETAMINOPHEN 650 MG: 325 TABLET ORAL at 12:01

## 2024-01-13 RX ADMIN — IOHEXOL 75 ML: 350 INJECTION, SOLUTION INTRAVENOUS at 12:01

## 2024-01-13 RX ADMIN — LEVOTHYROXINE SODIUM 75 MCG: 75 TABLET ORAL at 06:01

## 2024-01-13 RX ADMIN — ENOXAPARIN SODIUM 40 MG: 40 INJECTION SUBCUTANEOUS at 05:01

## 2024-01-13 RX ADMIN — PANTOPRAZOLE SODIUM 20 MG: 20 TABLET, DELAYED RELEASE ORAL at 08:01

## 2024-01-13 RX ADMIN — PANTOPRAZOLE SODIUM 20 MG: 20 TABLET, DELAYED RELEASE ORAL at 10:01

## 2024-01-13 RX ADMIN — DONEPEZIL HYDROCHLORIDE 10 MG: 5 TABLET, FILM COATED ORAL at 10:01

## 2024-01-13 RX ADMIN — Medication 6 MG: at 10:01

## 2024-01-13 RX ADMIN — POTASSIUM CHLORIDE, DEXTROSE MONOHYDRATE AND SODIUM CHLORIDE: 150; 5; 450 INJECTION, SOLUTION INTRAVENOUS at 05:01

## 2024-01-13 RX ADMIN — DULOXETINE HYDROCHLORIDE 30 MG: 30 CAPSULE, DELAYED RELEASE ORAL at 10:01

## 2024-01-13 RX ADMIN — ACETAMINOPHEN 650 MG: 325 TABLET ORAL at 10:01

## 2024-01-13 RX ADMIN — CEFTRIAXONE SODIUM 1 G: 1 INJECTION, POWDER, FOR SOLUTION INTRAMUSCULAR; INTRAVENOUS at 03:01

## 2024-01-13 NOTE — HPI
Anayeli Judd is an 79yoF with a past medical history significant for DMII, GERD, multiple intra-abdominal surgeries with recurrent partial small bowel obstructions who presents to the emergency room with complaints of nausea and abdominal pain. The patient is well-known to the general surgery service with three admissions during 2023 for partial small bowel obstructions, all of which were managed conservatively. The patient describes an indolent onset of gradually progressive abdominal pain that comes in waves. She describes associated nausea, which improves with zofran. She continues to endorse ongoing bowel function. Work up in the emergency room demonstrates a normal WBC, normal chemistry panel, UA consistent with a UTI and CT scan which continues to demonstrate dilated bowel at the site of her anastomosis.     On my exam, the patient is resting comfortably in bed. She states that her abdominal pain has improved since her presentation. She continues with bowel function. Plan of care discussed with the patient, who states that she would be more comfortable being observed in the hospital overnight.

## 2024-01-13 NOTE — ED TRIAGE NOTES
Anayeli Judd, a 79 y.o. female presents to the ED w/ complaint of ABD pain. Pt stated that she has a history of SBO, stated that this feels different. Stated that her ABD pain is more generalized, but is still passing gas and having diarrhea.     Triage note:  Chief Complaint   Patient presents with    Multiple complaints     Abdominal pain, N/V/D, states hx of SBO, lower back pain      Review of patient's allergies indicates:   Allergen Reactions    Codeine Itching and Nausea And Vomiting    Dilaudid [hydromorphone (bulk)] Other (See Comments)     Oversedating, head burning. Pt prefers to avoid.       Percocet [oxycodone-acetaminophen] Itching    Sulfa (sulfonamide antibiotics) Itching and Nausea And Vomiting    Dilaudid [hydromorphone]     Latex, natural rubber Rash     Past Medical History:   Diagnosis Date    Abdominal pain     Allergic rhinitis     Arthritis     Blood platelet disorder     Blood platelet disorder     Blood transfusion     during delivery and     Bowel obstruction     Cervical radiculopathy     followed by dr cloud    Colon cancer     transverse colon; resected; Stage IIA (pT3 pN0 MX)    Degenerative joint disease (DJD) of lumbar spine     Diabetes mellitus     Diarrhea     Falls 2020    Family history of breast cancer     Family history of colon cancer     Fatty liver     GERD (gastroesophageal reflux disease)     History of shingles     Hyperlipidemia     Hypomagnesemia     Hypothyroidism     Irritable bowel syndrome     Microscopic colitis     treated     Migraine     Myelodysplastic syndrome     Raynaud phenomenon     Raynaud's disease     Squamous cell carcinoma of skin     Type 2 diabetes mellitus     Usual hyperplasia of lactiferous duct

## 2024-01-13 NOTE — ED PROVIDER NOTES
Source of History:  Patient  Chart    Chief complaint:  Multiple complaints (Abdominal pain, N/V/D, states hx of SBO, lower back pain )      HPI:  Anayeli Judd is a 79 y.o. female with history of DM II, GERD, DVT, multiple prior SBOs s/p ex lap in 2014, 2017, 2022, esophageal varices, presenting to emergency department with complaint of abdominal pain, nausea, vomiting, and dark stools.    Patient states that abdominal pain, distention, nausea, and vomiting began today.  She has had nonbloody vomiting.  No fevers.  She also notes ongoing diarrhea/dark stools, thought that they looked tarry.  She has generalized weakness.  No chest pain or difficulty breathing.    Per chart review, patient had an incidental finding of esophageal varices during an upper endoscopy in October of 2023.  There was no prior diagnosis of cirrhosis.  She was seen by hepatology for this problem in November 2023.  Hepatology felt this presentation was likely secondary to undiagnosed cirrhosis with portal hypertension.      Her last admission for bowel obstruction was in September of 2023.  She complained of similar diffuse abdominal pain with associated nausea.  She was having bowel movements and passing gas with that presentation, similar to today.    Review of patient's allergies indicates:   Allergen Reactions    Codeine Itching and Nausea And Vomiting    Dilaudid [hydromorphone (bulk)] Other (See Comments)     Oversedating, head burning. Pt prefers to avoid.       Percocet [oxycodone-acetaminophen] Itching    Sulfa (sulfonamide antibiotics) Itching and Nausea And Vomiting    Dilaudid [hydromorphone]     Latex, natural rubber Rash       No current facility-administered medications on file prior to encounter.     Current Outpatient Medications on File Prior to Encounter   Medication Sig Dispense Refill    acetaminophen (TYLENOL) 650 MG TbSR Take 1,300 mg by mouth 2 (two) times a day.      ascorbic acid, vitamin C, (VITAMIN C) 1000 MG  tablet Take 1,000 mg by mouth once daily.      atorvastatin (LIPITOR) 40 MG tablet Take 40 mg by mouth once daily.      biotin 10,000 mcg TbDL Take 1 tablet by mouth once daily.       blood sugar diagnostic (FREESTYLE LITE STRIPS) Strp 1 strip by Misc.(Non-Drug; Combo Route) route 3 (three) times daily. 200 strip 5    blood-glucose meter kit Use as instructed 1 each 0    calcium-vitamin D3 (OS-KASSANDRA 500 + D3) 500 mg-5 mcg (200 unit) per tablet Take 1 tablet by mouth once daily.      conjugated estrogens (PREMARIN) vaginal cream INSERT 0.5 GRAM VAGINALLY 2 TIMES A WEEK 30 g 3    cranberry extract 200 mg Cap Take 1 capsule by mouth once daily.      donepeziL (ARICEPT) 10 MG tablet TAKE 1 TABLET(10 MG) BY MOUTH EVERY EVENING 90 tablet 3    DULoxetine (CYMBALTA) 30 MG capsule TAKE 1 CAPSULE BY MOUTH TWICE  DAILY 180 capsule 3    ferrous sulfate 324 mg (65 mg iron) TbEC Take 1 tablet (324 mg total) by mouth once daily.  0    flash glucose scanning reader (FREESTYLE EFREN 14 DAY READER) Misc Check blood glucose level 3 times daily. 1 each 0    flash glucose sensor (FREESTYLE EFREN 14 DAY SENSOR) Kit 1 application  by Misc.(Non-Drug; Combo Route) route every 14 (fourteen) days. Disp 30 or 90 day refill 6 kit 3    gabapentin (NEURONTIN) 300 MG capsule Take 1 to 2 capsules 3 times daily if needed for leg pain. 180 capsule 5    HYDROcodone-acetaminophen (NORCO) 5-325 mg per tablet Take 1 tablet by mouth every 6 (six) hours as needed for Pain. 28 tablet 0    hydrocortisone 2.5 % cream Apply topically 2 (two) times daily as needed. 1 each 1    lancets (FREESTYLE LANCETS) 28 gauge Misc 30 lancets by Misc.(Non-Drug; Combo Route) route Daily. 30 each 3    levothyroxine (SYNTHROID) 75 MCG tablet TAKE 1 TABLET(75 MCG) BY MOUTH BEFORE BREAKFAST 90 tablet 3    levothyroxine (SYNTHROID) 75 MCG tablet Take 1 tablet (75 mcg total) by mouth before breakfast. 90 tablet 3    LIDOcaine (LIDODERM) 5 % APPLY 1 TO 3 PATCHES TO BACK DAILY. REMOVE  WITHIN 12 HOURS 30 patch 2    linaGLIPtin (TRADJENTA) 5 mg Tab tablet Take 1 tablet (5 mg total) by mouth once daily. 90 tablet 3    loperamide (IMODIUM) 2 mg capsule Take 1 capsule (2 mg total) by mouth 2 (two) times daily as needed for Diarrhea. 30 capsule 1    magnesium 30 mg Tab Take 30 mg by mouth once. Patient does not know actual dose in MG      metFORMIN (GLUCOPHAGE) 500 MG tablet Take 1 tablet (500 mg total) by mouth 2 (two) times daily with meals. 180 tablet 3    mometasone (ELOCON) 0.1 % ointment Apply topically 2 (two) times a day. Mix 50/50 with mupirocin ointment. Apply in each nostril twice daily. 15 g 1    montelukast (SINGULAIR) 10 mg tablet TAKE 1 TABLET BY MOUTH  DAILY 90 tablet 3    ondansetron (ZOFRAN-ODT) 8 MG TbDL Take 1 tablet (8 mg total) by mouth every 8 (eight) hours as needed (nausea). 30 tablet 0    pantoprazole (PROTONIX) 20 MG tablet TAKE 1 TABLET(20 MG) BY MOUTH TWICE DAILY 180 tablet 1    polyethylene glycol (GLYCOLAX) 17 gram PwPk Take 17 g by mouth once daily. 90 each 3    traZODone (DESYREL) 50 MG tablet Take 1 tablet (50 mg total) by mouth every evening. 90 tablet 1    ubrogepant (UBRELVY) 100 mg tablet Take 1 tablet daily as needed for migraine headache. May take 1 additional tablet 2 hours later if needed. 16 tablet 2    valACYclovir (VALTREX) 1000 MG tablet TAKE 1 TABLET(1000 MG) BY MOUTH EVERY DAY 30 tablet 2    [DISCONTINUED] hyoscyamine (ANASPAZ,LEVSIN) 0.125 mg Tab TAKE 1 TABLET(125 MCG) BY MOUTH EVERY 8 HOURS AS NEEDED FOR URGENCY 270 tablet 3       PMH:  As per HPI and below:  Past Medical History:   Diagnosis Date    Abdominal pain     Allergic rhinitis     Arthritis     Blood platelet disorder     Blood platelet disorder     Blood transfusion     during delivery and     Bowel obstruction     Cervical radiculopathy     followed by dr cloud    Colon cancer     transverse colon; resected; Stage IIA (pT3 pN0 MX)    Degenerative joint disease (DJD) of lumbar  spine     Diabetes mellitus     Diarrhea     Falls 2020    Family history of breast cancer     Family history of colon cancer     Fatty liver     GERD (gastroesophageal reflux disease)     History of shingles     Hyperlipidemia     Hypomagnesemia     Hypothyroidism     Irritable bowel syndrome     Microscopic colitis     treated     Migraine     Myelodysplastic syndrome     Raynaud phenomenon     Raynaud's disease     Squamous cell carcinoma of skin     Type 2 diabetes mellitus     Usual hyperplasia of lactiferous duct 1970     Past Surgical History:   Procedure Laterality Date    ADENOIDECTOMY      APPENDECTOMY      BACK SURGERY      BREAST BIOPSY  1970    BREAST CYST EXCISION  1970?    BREAST LUMPECTOMY  1970    CARPAL TUNNEL RELEASE      bilateral      SECTION      CHOLECYSTECTOMY  1965    COLECTOMY  2011    Transverse colon resection by Dr. Aguirre    COLONOSCOPY N/A 2017    Procedure: COLONOSCOPY;  Surgeon: Manjit Alvarez MD;  Location: [facility] (4TH FLR);  Service: Endoscopy;  Laterality: N/A;    COLONOSCOPY N/A 2019    Procedure: COLONOSCOPY;  Surgeon: Ramiro Jefferson MD;  Location: [facility] (4TH FLR);  Service: Endoscopy;  Laterality: N/A;  PM prep    COLONOSCOPY N/A 2022    Procedure: COLONOSCOPY;  Surgeon: Ramiro Jefferson MD;  Location: [facility] (2ND FLR);  Service: Endoscopy;  Laterality: N/A;  EGD and colonoscopy in 6-8 weeks from now     states has constipation. -extended Golytely prep    CYSTOSCOPY N/A 2021    Procedure: CYSTOSCOPY;  Surgeon: Viridiana Valenzuela MD;  [address]  Service: Urology;  Laterality: N/A;    ENDOSCOPIC ULTRASOUND OF UPPER GASTROINTESTINAL TRACT N/A 2018    Procedure: ULTRASOUND, UPPER GI TRACT, ENDOSCOPIC;  Surgeon: Jose Hess MD;  Location: [facility];  Service: Endoscopy;  Laterality: N/A;    ENDOSCOPIC ULTRASOUND OF UPPER GASTROINTESTINAL TRACT N/A 2019    Procedure: ULTRASOUND, UPPER GI  TRACT, ENDOSCOPIC;  Surgeon: Jose Hess MD;  Location: [facility] (2ND FLR);  Service: Endoscopy;  Laterality: N/A;    ENDOSCOPIC ULTRASOUND OF UPPER GASTROINTESTINAL TRACT N/A 10/25/2023    Procedure: ULTRASOUND, UPPER GI TRACT, ENDOSCOPIC;  Surgeon: Jose Campos MD;  Location: [facility];  Service: Endoscopy;  Laterality: N/A;  10/20/23: instructions sent via portal-GD    ESOPHAGOGASTRODUODENOSCOPY N/A 11/16/2018    Procedure: EGD (ESOPHAGOGASTRODUODENOSCOPY);  Surgeon: Angelo Reynolds MD;  Location: [facility] (2ND FLR);  Service: Endoscopy;  Laterality: N/A;    ESOPHAGOGASTRODUODENOSCOPY N/A 06/26/2019    Procedure: EGD (ESOPHAGOGASTRODUODENOSCOPY);  Surgeon: Ramiro Jefferson MD;  Location: [facility] (4TH FLR);  Service: Endoscopy;  Laterality: N/A;    ESOPHAGOGASTRODUODENOSCOPY N/A 05/04/2022    Procedure: EGD (ESOPHAGOGASTRODUODENOSCOPY);  Surgeon: Ramiro Jefferson MD;  Location: [facility] (2ND FLR);  Service: Endoscopy;  Laterality: N/A;  fully vaccinated-GT    ESOPHAGOGASTRODUODENOSCOPY N/A 10/25/2023    Procedure: EGD (ESOPHAGOGASTRODUODENOSCOPY);  Surgeon: Jose Campos MD;  Location: [facility];  Service: Endoscopy;  Laterality: N/A;    ESOPHAGOGASTRODUODENOSCOPY N/A 11/15/2023    Procedure: ESOPHAGOGASTRODUODENOSCOPY (EGD);  Surgeon: Db Sanchez MD;  Location: [facility] (4TH FLR);  Service: Endoscopy;  Laterality: N/A;  New diagnosis of esophageal varices. Not a candidate for beta blocker due to soft blood pressures. Recommend EV banding.  PT/INR done on 11/3/23 and CBC done on 9/30/23  ok per Dr. Espinoza to be done by 1st available provider-see tele encounter-st  1    EYE SURGERY      Cataract Removal    FLUOROSCOPIC URODYNAMIC STUDY N/A 11/09/2021    Procedure: URODYNAMIC STUDY, FLUOROSCOPIC;  Surgeon: Viridiana Valenzuela MD;  Location: [facility] OR [address] [place] [address];  Service: Urology;  Laterality: N/A;  90 minutes     HYSTERECTOMY      JOINT REPLACEMENT      OOPHORECTOMY  1970    posterolateral  fusion with autograft bone and [place] mineralized bone matrix  02/01/2013    at [facility] for lumbar spine stenosis    SMALL INTESTINE SURGERY      SPINE SURGERY      TOE SURGERY      TONSILLECTOMY      TRANSFORAMINAL EPIDURAL INJECTION OF STEROID Bilateral 12/21/2022    Procedure: Injection,steroid,epidural, Todd L5/S1 TF CONTRAST DIRECT REFERRAL;  Surgeon: Toni Osorio MD;  Location: [facility] PAIN MGT;  Service: Pain Management;  Laterality: Bilateral;    TRIGGER FINGER RELEASE         Social History     Socioeconomic History    Marital status:    Tobacco Use    Smoking status: Never    Smokeless tobacco: Never   Substance and Sexual Activity    Alcohol use: Not Currently     Comment: socially, rarely    Drug use: Never    Sexual activity: Not Currently   Social History Narrative    , lives alone.  Primary support are her children and friends.     Social Determinants of Health     Financial Resource Strain: Low Risk  (9/28/2023)    Overall Financial Resource Strain (CARDIA)     Difficulty of Paying Living Expenses: Not hard at all   Food Insecurity: No Food Insecurity (9/28/2023)    Hunger Vital Sign     Worried About Running Out of Food in the Last Year: Never true     Ran Out of Food in the Last Year: Never true   Transportation Needs: No Transportation Needs (9/28/2023)    PRAPARE - Transportation     Lack of Transportation (Medical): No     Lack of Transportation (Non-Medical): No   Physical Activity: Inactive (9/28/2023)    Exercise Vital Sign     Days of Exercise per Week: 0 days     Minutes of Exercise per Session: 0 min   Stress: No Stress Concern Present (7/8/2023)    [place] [employer] - Occupational Stress Questionnaire     Feeling of Stress : Not at all   Social Connections: Unknown (9/28/2023)    Social Connection and Isolation Panel [NHANES]     Frequency of Communication with Friends and Family: More than three times a week     Frequency of Social Gatherings with Friends  and Family: More than three times a week     Attends Faith Services: Never     Active Member of Clubs or Organizations: No     Attends Club or Organization Meetings: Never     Marital Status: Patient declined   Housing Stability: Low Risk  (9/28/2023)    Housing Stability Vital Sign     Unable to Pay for Housing in the Last Year: No     Number of Places Lived in the Last Year: 1     Unstable Housing in the Last Year: No       Family History   Problem Relation Age of Onset    Heart disease Father 50        Mi age 50    Colon cancer Father     Bladder Cancer Mother         non smoker    Cataracts Mother     Glaucoma Mother     Heart disease Mother     Hyperlipidemia Mother     Kidney disease Mother     Breast cancer Sister 79    Arthritis Daughter     Asthma Daughter     Depression Daughter     Hypertension Daughter     Stroke Daughter 40    Arthritis Brother     Colon cancer Brother 70    Alcohol abuse Brother     Parkinsonism Brother     Alcohol abuse Brother     Depression Daughter     Stroke Daughter     Alcohol abuse Brother     Arthritis Brother     Asthma Daughter     Depression Daughter     Celiac disease Neg Hx     Cirrhosis Neg Hx     Colon polyps Neg Hx     Crohn's disease Neg Hx     Cystic fibrosis Neg Hx     Esophageal cancer Neg Hx     Hemochromatosis Neg Hx     Inflammatory bowel disease Neg Hx     Irritable bowel syndrome Neg Hx     Liver cancer Neg Hx     Liver disease Neg Hx     Rectal cancer Neg Hx     Stomach cancer Neg Hx     Ulcerative colitis Neg Hx     Eliazar's disease Neg Hx     Amblyopia Neg Hx     Blindness Neg Hx     Macular degeneration Neg Hx     Retinal detachment Neg Hx     Strabismus Neg Hx     Melanoma Neg Hx        Physical Exam:      Vitals:    01/13/24 0403   BP: (!) 103/51   Pulse: 63   Resp:    Temp:      Gen: No acute distress.  Nontoxic.  Well appearing.  Mental Status:  Alert and oriented .  Appropriate, conversant.  Skin: Warm, dry. Pale. No rashes seen.  Eyes: No  conjunctival injection.  Pulm: CTAB. No increased work of breathing.  No significant tachypnea.  No audible stridor or wheezing.  No conversational dyspnea.    CV: Regular rate. Regular rhythm.   Abd:  Hypoactive bowel sounds. Soft.  Distended.  Mild diffuse tenderness to palpation without rebound tenderness or guarding.  MSK: Good range of motion all joints.  No deformities.    Neuro: Awake. Speech normal. No focal neuro deficit observed.      Laboratory Studies:  Labs Reviewed   CBC W/ AUTO DIFFERENTIAL - Abnormal; Notable for the following components:       Result Value    RBC 3.40 (*)     Hemoglobin 11.9 (*)     Hematocrit 35.2 (*)      (*)     MCH 35.0 (*)     Platelets 97 (*)     All other components within normal limits   COMPREHENSIVE METABOLIC PANEL - Abnormal; Notable for the following components:    CO2 22 (*)     Glucose 149 (*)     Albumin 3.4 (*)     AST 46 (*)     eGFR 56.6 (*)     Anion Gap 7 (*)     All other components within normal limits   URINALYSIS, REFLEX TO URINE CULTURE - Abnormal; Notable for the following components:    Appearance, UA Hazy (*)     Leukocytes, UA 3+ (*)     All other components within normal limits    Narrative:     Specimen Source->Urine   URINALYSIS MICROSCOPIC - Abnormal; Notable for the following components:    WBC, UA >100 (*)     WBC Clumps, UA Moderate (*)     Bacteria Many (*)     Non-Squam Epith 1 (*)     Hyaline Casts, UA 7 (*)     All other components within normal limits    Narrative:     Specimen Source->Urine   CULTURE, URINE   LIPASE   PROTIME-INR   TROPONIN I   TYPE & SCREEN       EKG (independently interpreted by me):  Normal sinus rhythm, rate 68.  No STEMI.  QRS 80 milliseconds.   milliseconds.    Chart reviewed.     Imaging Results              X-Ray Chest 1 View (In process)                       CT Abdomen Pelvis With IV Contrast NO Oral Contrast (Final result)  Result time 01/13/24 03:15:02      Final result by Judson Ellison MD  (01/13/24 03:15:02)                   Impression:      Persistent mild small bowel loop distension and mesenteric fat stranding similar to prior suggesting chronic enteritis.    Frankly dilated small bowel loops near the lower abdominal enteroenteric anastomosis; ileus versus mechanical small obstruction.    No free intraperitoneal air.    No free or loculated intraperitoneal fluid.    Probable gastritis, with a distribution of involvement suspicious for H pylori.    Splenomegaly, small intraesophageal varices, nonenlarged liver.  Subtly micronodular liver contour suspicious for cirrhosis.  At 11 mm, the main portal vein diameter is within normal limits.    Additional observations as detailed in the body of the report.    This report was flagged in Epic as abnormal.    Electronically signed by resident: Violet Luu  Date:    01/13/2024  Time:    00:55    Electronically signed by: Judson Ellison  Date:    01/13/2024  Time:    03:15               Narrative:    EXAMINATION:  CT ABDOMEN PELVIS WITH IV CONTRAST    CLINICAL HISTORY:  Bowel obstruction suspected;    TECHNIQUE:  Using 75 mL of  Omnipaque 350 IV contrast and multi-detector helical CT technique, axial CT images of the abdomen and pelvis were obtained. 2D post-processing coronal and sagittal reconstructions was performed.    COMPARISON:  09/27/2023    FINDINGS:  Lung base: Soon oriented to mildly serpiginous enhancing structures in visualized distal esophagus, suspicious for mural/mucosal esophageal varices.    Bibasilar pulmonary opacities likely represent dependent atelectasis.    Visualized portion of heart: No significant abnormality.    Liver: Right hepatic lobe enhancing lesion measuring 1.1 cm similar to prior allowing for different measurements.  Main portal vein diameter approximately 11 mm. Subtly micronodular liver contour, suspicious for cirrhosis.    Gallbladder: Cholecystectomy.    Bile duct: No intra-or extrahepatic biliary ductal  dilatation.    Spleen: Pre-existing splenomegaly.    Pancreas: Unremarkable.    Adrenal glands: Unremarkable.    Genitourinary: Right renal exophytic cyst. The ureters appear normal in course and caliber.  No evidence of nephrolithiasis or hydroureteronephrosis.  The urinary bladder demonstrates smooth contour with no wall thickening.    Reproductive organs: Hysterectomy.    GI tract: The stomach is suboptimally distended.  Allowing for this, there is circumferential wall thickening in the antrum and eccentric wall thickening the mainly involves the greater curvature..  There remain some distended to frankly dilated small bowel loops.  A partial or low-grade small bowel obstruction is not excluded.  Right paracolic gutter fat stranding similar to prior.    Peritoneum: No evidence of ascites or intraperitoneal free air.  Mesenteric edema similar to prior    Retroperitoneum: No significant adenopathy.    Vasculature: Normal caliber of abdominal aorta with moderate atherosclerosis.    Abdominal wall: Anterior abdominal wall subcutaneous fat stranding similar to prior.    Bones: No acute osseous abnormality.                                      Medications Given:  Medications   donepeziL tablet 10 mg (has no administration in time range)   DULoxetine DR capsule 30 mg (has no administration in time range)   levothyroxine tablet 75 mcg (has no administration in time range)   pantoprazole EC tablet 20 mg (has no administration in time range)   ondansetron disintegrating tablet 8 mg (has no administration in time range)   melatonin tablet 6 mg (has no administration in time range)   dextrose 5 % and 0.45 % NaCl with KCl 20 mEq infusion (has no administration in time range)   enoxaparin injection 40 mg (has no administration in time range)   promethazine tablet 25 mg (has no administration in time range)   morphine injection 4 mg (4 mg Intravenous Given 1/12/24 2802)   pantoprazole injection 40 mg (40 mg Intravenous Given  1/12/24 0435)   iohexoL (OMNIPAQUE 350) injection 75 mL (75 mLs Intravenous Given 1/13/24 0013)   cefTRIAXone (ROCEPHIN) 1 g in dextrose 5 % in water (D5W) 100 mL IVPB (MB+) (1 g Intravenous New Bag 1/13/24 0305)       Discussed with:  General surgeon    MDM:    79 y.o. female with history of multiple prior SBO, recent diagnosis of cirrhosis and small esophageal varices presenting to emergency department with complaint of abdominal pain, vomiting, and dark stools.  She is afebrile, hemodynamically stable.    Labs, urinalysis, CT abdomen pelvis were obtained.  Hemoglobin is actually improved from prior.  Normal PT INR.  Stable creatinine.  Normal troponin.  Normal lipase.    Urinalysis with 3+ leukocytes, greater than 100 WBCs and many bacteria, will treat for urinary tract infection.    Patient desatted while sleeping, I obtained a chest x-ray When awake, normal room air sats.    CT abdomen pelvis concerning for ileus versus small-bowel obstruction.  General surgery consulted.        Diagnostic Impression:    1. Nausea & vomiting    2. Hypoxia         ED Disposition Condition    Observation Stable               Patient understands the plan and is in agreement, verbalized understanding, questions answered    Mariajose Vigil MD  Emergency Medicine         Mariajose Vigil MD  01/13/24 6735

## 2024-01-13 NOTE — ED NOTES
Pt remains in bed with rails up x2.  Family member at bedside.  Call light within reach.  Pt states no other needs at this time.

## 2024-01-13 NOTE — ED NOTES
Pt ambulated to restroom with assistance of family member.  Pt provided with bedside commode per pt request.

## 2024-01-13 NOTE — ED NOTES
Bed: [facility]  Expected date:   Expected time:   Means of arrival:   Comments:  [place]

## 2024-01-13 NOTE — SUBJECTIVE & OBJECTIVE
No current facility-administered medications on file prior to encounter.     Current Outpatient Medications on File Prior to Encounter   Medication Sig    acetaminophen (TYLENOL) 650 MG TbSR Take 1,300 mg by mouth 2 (two) times a day.    ascorbic acid, vitamin C, (VITAMIN C) 1000 MG tablet Take 1,000 mg by mouth once daily.    atorvastatin (LIPITOR) 40 MG tablet Take 40 mg by mouth once daily.    biotin 10,000 mcg TbDL Take 1 tablet by mouth once daily.     blood sugar diagnostic (FREESTYLE LITE STRIPS) Strp 1 strip by Misc.(Non-Drug; Combo Route) route 3 (three) times daily.    blood-glucose meter kit Use as instructed    calcium-vitamin D3 (OS-KASSANDRA 500 + D3) 500 mg-5 mcg (200 unit) per tablet Take 1 tablet by mouth once daily.    conjugated estrogens (PREMARIN) vaginal cream INSERT 0.5 GRAM VAGINALLY 2 TIMES A WEEK    cranberry extract 200 mg Cap Take 1 capsule by mouth once daily.    donepeziL (ARICEPT) 10 MG tablet TAKE 1 TABLET(10 MG) BY MOUTH EVERY EVENING    DULoxetine (CYMBALTA) 30 MG capsule TAKE 1 CAPSULE BY MOUTH TWICE  DAILY    ferrous sulfate 324 mg (65 mg iron) TbEC Take 1 tablet (324 mg total) by mouth once daily.    flash glucose scanning reader (FREESTYLE EFREN 14 DAY READER) Misc Check blood glucose level 3 times daily.    flash glucose sensor (FREESTYLE EFREN 14 DAY SENSOR) Kit 1 application  by Misc.(Non-Drug; Combo Route) route every 14 (fourteen) days. Disp 30 or 90 day refill    gabapentin (NEURONTIN) 300 MG capsule Take 1 to 2 capsules 3 times daily if needed for leg pain.    HYDROcodone-acetaminophen (NORCO) 5-325 mg per tablet Take 1 tablet by mouth every 6 (six) hours as needed for Pain.    hydrocortisone 2.5 % cream Apply topically 2 (two) times daily as needed.    lancets (FREESTYLE LANCETS) 28 gauge Misc 30 lancets by Misc.(Non-Drug; Combo Route) route Daily.    levothyroxine (SYNTHROID) 75 MCG tablet TAKE 1 TABLET(75 MCG) BY MOUTH BEFORE BREAKFAST    levothyroxine (SYNTHROID) 75 MCG  tablet Take 1 tablet (75 mcg total) by mouth before breakfast.    LIDOcaine (LIDODERM) 5 % APPLY 1 TO 3 PATCHES TO BACK DAILY. REMOVE WITHIN 12 HOURS    linaGLIPtin (TRADJENTA) 5 mg Tab tablet Take 1 tablet (5 mg total) by mouth once daily.    loperamide (IMODIUM) 2 mg capsule Take 1 capsule (2 mg total) by mouth 2 (two) times daily as needed for Diarrhea.    magnesium 30 mg Tab Take 30 mg by mouth once. Patient does not know actual dose in MG    metFORMIN (GLUCOPHAGE) 500 MG tablet Take 1 tablet (500 mg total) by mouth 2 (two) times daily with meals.    mometasone (ELOCON) 0.1 % ointment Apply topically 2 (two) times a day. Mix 50/50 with mupirocin ointment. Apply in each nostril twice daily.    montelukast (SINGULAIR) 10 mg tablet TAKE 1 TABLET BY MOUTH  DAILY    ondansetron (ZOFRAN-ODT) 8 MG TbDL Take 1 tablet (8 mg total) by mouth every 8 (eight) hours as needed (nausea).    pantoprazole (PROTONIX) 20 MG tablet TAKE 1 TABLET(20 MG) BY MOUTH TWICE DAILY    polyethylene glycol (GLYCOLAX) 17 gram PwPk Take 17 g by mouth once daily.    traZODone (DESYREL) 50 MG tablet Take 1 tablet (50 mg total) by mouth every evening.    ubrogepant (UBRELVY) 100 mg tablet Take 1 tablet daily as needed for migraine headache. May take 1 additional tablet 2 hours later if needed.    valACYclovir (VALTREX) 1000 MG tablet TAKE 1 TABLET(1000 MG) BY MOUTH EVERY DAY    [DISCONTINUED] hyoscyamine (ANASPAZ,LEVSIN) 0.125 mg Tab TAKE 1 TABLET(125 MCG) BY MOUTH EVERY 8 HOURS AS NEEDED FOR URGENCY       Review of patient's allergies indicates:   Allergen Reactions    Codeine Itching and Nausea And Vomiting    Dilaudid [hydromorphone (bulk)] Other (See Comments)     Oversedating, head burning. Pt prefers to avoid.       Percocet [oxycodone-acetaminophen] Itching    Sulfa (sulfonamide antibiotics) Itching and Nausea And Vomiting    Dilaudid [hydromorphone]     Latex, natural rubber Rash       Past Medical History:   Diagnosis Date    Abdominal  pain     Allergic rhinitis     Arthritis     Blood platelet disorder     Blood platelet disorder     Blood transfusion     during delivery and     Bowel obstruction     Cervical radiculopathy     followed by dr cloud    Colon cancer     transverse colon; resected; Stage IIA (pT3 pN0 MX)    Degenerative joint disease (DJD) of lumbar spine     Diabetes mellitus     Diarrhea     Falls 2020    Family history of breast cancer     Family history of colon cancer     Fatty liver     GERD (gastroesophageal reflux disease)     History of shingles     Hyperlipidemia     Hypomagnesemia     Hypothyroidism     Irritable bowel syndrome     Microscopic colitis     treated     Migraine     Myelodysplastic syndrome     Raynaud phenomenon     Raynaud's disease     Squamous cell carcinoma of skin     Type 2 diabetes mellitus     Usual hyperplasia of lactiferous duct      Past Surgical History:   Procedure Laterality Date    ADENOIDECTOMY      APPENDECTOMY      BACK SURGERY      BREAST BIOPSY  1970    BREAST CYST EXCISION  1970?    BREAST LUMPECTOMY  1970    CARPAL TUNNEL RELEASE      bilateral      SECTION      CHOLECYSTECTOMY  1965    COLECTOMY  2011    Transverse colon resection by Dr. Aguirre    COLONOSCOPY N/A 2017    Procedure: COLONOSCOPY;  Surgeon: Manjit Alvarez MD;  Location: [facility] (4TH FLR);  Service: Endoscopy;  Laterality: N/A;    COLONOSCOPY N/A 2019    Procedure: COLONOSCOPY;  Surgeon: Ramiro Jefferson MD;  Location: [facility] (4TH FLR);  Service: Endoscopy;  Laterality: N/A;  PM prep    COLONOSCOPY N/A 2022    Procedure: COLONOSCOPY;  Surgeon: Ramiro Jefferson MD;  Location: [facility] (2ND FLR);  Service: Endoscopy;  Laterality: N/A;  EGD and colonoscopy in 6-8 weeks from now     states has constipation. -extended Golytely prep    CYSTOSCOPY N/A 2021    Procedure: CYSTOSCOPY;  Surgeon: Viridiana Valenzuela MD;  Location: [address];  Service: Urology;   Laterality: N/A;    ENDOSCOPIC ULTRASOUND OF UPPER GASTROINTESTINAL TRACT N/A 12/12/2018    Procedure: ULTRASOUND, UPPER GI TRACT, ENDOSCOPIC;  Surgeon: Jose Hess MD;  Location: [facility];  Service: Endoscopy;  Laterality: N/A;    ENDOSCOPIC ULTRASOUND OF UPPER GASTROINTESTINAL TRACT N/A 01/23/2019    Procedure: ULTRASOUND, UPPER GI TRACT, ENDOSCOPIC;  Surgeon: Jose Hess MD;  Location: [facility] (2ND FLR);  Service: Endoscopy;  Laterality: N/A;    ENDOSCOPIC ULTRASOUND OF UPPER GASTROINTESTINAL TRACT N/A 10/25/2023    Procedure: ULTRASOUND, UPPER GI TRACT, ENDOSCOPIC;  Surgeon: Jose Campos MD;  Location: [facility];  Service: Endoscopy;  Laterality: N/A;  10/20/23: instructions sent via portal-GD    ESOPHAGOGASTRODUODENOSCOPY N/A 11/16/2018    Procedure: EGD (ESOPHAGOGASTRODUODENOSCOPY);  Surgeon: Angelo Reynolds MD;  Location: [facility] (2ND FLR);  Service: Endoscopy;  Laterality: N/A;    ESOPHAGOGASTRODUODENOSCOPY N/A 06/26/2019    Procedure: EGD (ESOPHAGOGASTRODUODENOSCOPY);  Surgeon: Ramiro Jefferson MD;  Location: [facility] (4TH FLR);  Service: Endoscopy;  Laterality: N/A;    ESOPHAGOGASTRODUODENOSCOPY N/A 05/04/2022    Procedure: EGD (ESOPHAGOGASTRODUODENOSCOPY);  Surgeon: Ramiro Jefferson MD;  Location: [facility] (2ND FLR);  Service: Endoscopy;  Laterality: N/A;  fully vaccinated-GT    ESOPHAGOGASTRODUODENOSCOPY N/A 10/25/2023    Procedure: EGD (ESOPHAGOGASTRODUODENOSCOPY);  Surgeon: Jose Campos MD;  Location: [facility];  Service: Endoscopy;  Laterality: N/A;    ESOPHAGOGASTRODUODENOSCOPY N/A 11/15/2023    Procedure: ESOPHAGOGASTRODUODENOSCOPY (EGD);  Surgeon: Db Sanchez MD;  Location: [facility] (4TH FLR);  Service: Endoscopy;  Laterality: N/A;  New diagnosis of esophageal varices. Not a candidate for beta blocker due to soft blood pressures. Recommend EV banding.  PT/INR done on 11/3/23 and CBC done on 9/30/23  ok per Dr. Espinoza to be done by 1st available provider-see  tele encounter-st  1    EYE SURGERY      Cataract Removal    FLUOROSCOPIC URODYNAMIC STUDY N/A 11/09/2021    Procedure: URODYNAMIC STUDY, FLUOROSCOPIC;  Surgeon: Viridiana Valenzuela MD;  Location: [facility] OR [address] [place] [address];  Service: Urology;  Laterality: N/A;  90 minutes     HYSTERECTOMY      JOINT REPLACEMENT      OOPHORECTOMY  1970    posterolateral fusion with autograft bone and Fisher mineralized bone matrix  02/01/2013    at [facility] for lumbar spine stenosis    SMALL INTESTINE SURGERY      SPINE SURGERY      TOE SURGERY      TONSILLECTOMY      TRANSFORAMINAL EPIDURAL INJECTION OF STEROID Bilateral 12/21/2022    Procedure: Injection,steroid,epidural, Todd L5/S1 TF CONTRAST DIRECT REFERRAL;  Surgeon: Toni Osorio MD;  Location: [facility] PAIN MGT;  Service: Pain Management;  Laterality: Bilateral;    TRIGGER FINGER RELEASE       Family History       Problem Relation (Age of Onset)    Alcohol abuse Brother, Brother, Brother    Arthritis Daughter, Brother, Brother    Asthma Daughter, Daughter    Bladder Cancer Mother    Breast cancer Sister (79)    Cataracts Mother    Colon cancer Father, Brother (70)    Depression Daughter, Daughter, Daughter    Glaucoma Mother    Heart disease Father (50), Mother    Hyperlipidemia Mother    Hypertension Daughter    Kidney disease Mother    Parkinsonism Brother    Stroke Daughter (40), Daughter          Tobacco Use    Smoking status: Never    Smokeless tobacco: Never   Substance and Sexual Activity    Alcohol use: Not Currently     Comment: socially, rarely    Drug use: Never    Sexual activity: Not Currently     Review of Systems   Constitutional:  Positive for appetite change. Negative for activity change, chills, diaphoresis, fatigue and fever.   HENT: Negative.     Respiratory:  Negative for cough and shortness of breath.    Cardiovascular:  Negative for chest pain and leg swelling.   Gastrointestinal:  Positive for abdominal pain and nausea. Negative for abdominal distention, constipation,  diarrhea and vomiting.   Musculoskeletal: Negative.    Skin: Negative.      Objective:     Vital Signs (Most Recent):  Temp: 98 °F (36.7 °C) (01/13/24 0403)  Pulse: 63 (01/13/24 0403)  Resp: 20 (01/13/24 0403)  BP: (!) 103/51 (01/13/24 0403)  SpO2: 96 % (01/13/24 0403) Vital Signs (24h Range):  Temp:  [97.7 °F (36.5 °C)-98.9 °F (37.2 °C)] 98 °F (36.7 °C)  Pulse:  [61-99] 63  Resp:  [13-20] 20  SpO2:  [93 %-98 %] 96 %  BP: ()/(51-60) 103/51     Weight: 60.3 kg (133 lb)  Body mass index is 24.33 kg/m².     Physical Exam  Vitals and nursing note reviewed.   Constitutional:       General: She is not in acute distress.     Appearance: Normal appearance. She is not ill-appearing.   HENT:      Nose: Nose normal.      Mouth/Throat:      Mouth: Mucous membranes are moist.   Cardiovascular:      Rate and Rhythm: Normal rate and regular rhythm.   Pulmonary:      Effort: Pulmonary effort is normal. No respiratory distress.   Abdominal:      Comments: Well healed midline scar  Abdomen soft, non-distended  Non-tender to light and deep palpation throughout abdomen   Musculoskeletal:         General: Normal range of motion.   Skin:     General: Skin is warm and dry.   Neurological:      General: No focal deficit present.      Mental Status: She is alert.            I have reviewed all pertinent lab results within the past 24 hours.  CBC:   Recent Labs   Lab 01/12/24 2233   WBC 6.87   RBC 3.40*   HGB 11.9*   HCT 35.2*   PLT 97*   *   MCH 35.0*   MCHC 33.8     CMP:   Recent Labs   Lab 01/12/24 2233   *   CALCIUM 9.8   ALBUMIN 3.4*   PROT 7.2      K 4.4   CO2 22*      BUN 16   CREATININE 1.0   ALKPHOS 87   ALT 27   AST 46*   BILITOT 0.8       Significant Diagnostics:  I have reviewed all pertinent imaging results/findings within the past 24 hours.  CT scan reviewed; demonstrates persistent dilation about her anastomosis, which is largely stable when compared to her previous exams.

## 2024-01-13 NOTE — CONSULTS
[facility]  General Surgery  Consult Note    Patient Name: Anayeli Judd  MRN: [id number]  Code Status: Full Code  Admission Date: 1/12/2024  Hospital Length of Stay: 0 days  Attending Physician: Rojelio Donaldson MD  Primary Care Provider: Darci Guthrie MD    Patient information was obtained from patient.     Inpatient consult to General surgery  Consult performed by: Primo Mendes MD  Consult ordered by: Mariajose Vigil MD        Subjective:     Principal Problem: Partial small bowel obstruction    History of Present Illness: Anayeli Judd is an 79yoF with a past medical history significant for DMII, GERD, multiple intra-abdominal surgeries with recurrent partial small bowel obstructions who presents to the emergency room with complaints of nausea and abdominal pain. The patient is well-known to the general surgery service with three admissions during 2023 for partial small bowel obstructions, all of which were managed conservatively. The patient describes an indolent onset of gradually progressive abdominal pain that comes in waves. She describes associated nausea, which improves with zofran. She continues to endorse ongoing bowel function. Work up in the emergency room demonstrates a normal WBC, normal chemistry panel, UA consistent with a UTI and CT scan which continues to demonstrate dilated bowel at the site of her anastomosis.     On my exam, the patient is resting comfortably in bed. She states that her abdominal pain has improved since her presentation. She continues with bowel function. Plan of care discussed with the patient, who states that she would be more comfortable being observed in the hospital overnight.     No current facility-administered medications on file prior to encounter.     Current Outpatient Medications on File Prior to Encounter   Medication Sig    acetaminophen (TYLENOL) 650 MG TbSR Take 1,300 mg by mouth 2 (two) times a day.    ascorbic acid, vitamin C,  (VITAMIN C) 1000 MG tablet Take 1,000 mg by mouth once daily.    atorvastatin (LIPITOR) 40 MG tablet Take 40 mg by mouth once daily.    biotin 10,000 mcg TbDL Take 1 tablet by mouth once daily.     blood sugar diagnostic (FREESTYLE LITE STRIPS) Strp 1 strip by Misc.(Non-Drug; Combo Route) route 3 (three) times daily.    blood-glucose meter kit Use as instructed    calcium-vitamin D3 (OS-KASSANDRA 500 + D3) 500 mg-5 mcg (200 unit) per tablet Take 1 tablet by mouth once daily.    conjugated estrogens (PREMARIN) vaginal cream INSERT 0.5 GRAM VAGINALLY 2 TIMES A WEEK    cranberry extract 200 mg Cap Take 1 capsule by mouth once daily.    donepeziL (ARICEPT) 10 MG tablet TAKE 1 TABLET(10 MG) BY MOUTH EVERY EVENING    DULoxetine (CYMBALTA) 30 MG capsule TAKE 1 CAPSULE BY MOUTH TWICE  DAILY    ferrous sulfate 324 mg (65 mg iron) TbEC Take 1 tablet (324 mg total) by mouth once daily.    flash glucose scanning reader ([employer] EFREN 14 DAY READER) Misc Check blood glucose level 3 times daily.    flash glucose sensor (FREESTYLE EFREN 14 DAY SENSOR) Kit 1 application  by Misc.(Non-Drug; Combo Route) route every 14 (fourteen) days. Disp 30 or 90 day refill    gabapentin (NEURONTIN) 300 MG capsule Take 1 to 2 capsules 3 times daily if needed for leg pain.    HYDROcodone-acetaminophen (NORCO) 5-325 mg per tablet Take 1 tablet by mouth every 6 (six) hours as needed for Pain.    hydrocortisone 2.5 % cream Apply topically 2 (two) times daily as needed.    lancets (FREESTYLE LANCETS) 28 gauge Misc 30 lancets by Misc.(Non-Drug; Combo Route) route Daily.    levothyroxine (SYNTHROID) 75 MCG tablet TAKE 1 TABLET(75 MCG) BY MOUTH BEFORE BREAKFAST    levothyroxine (SYNTHROID) 75 MCG tablet Take 1 tablet (75 mcg total) by mouth before breakfast.    LIDOcaine (LIDODERM) 5 % APPLY 1 TO 3 PATCHES TO BACK DAILY. REMOVE WITHIN 12 HOURS    linaGLIPtin (TRADJENTA) 5 mg Tab tablet Take 1 tablet (5 mg total) by mouth once daily.    loperamide (IMODIUM) 2  mg capsule Take 1 capsule (2 mg total) by mouth 2 (two) times daily as needed for Diarrhea.    magnesium 30 mg Tab Take 30 mg by mouth once. Patient does not know actual dose in MG    metFORMIN (GLUCOPHAGE) 500 MG tablet Take 1 tablet (500 mg total) by mouth 2 (two) times daily with meals.    mometasone (ELOCON) 0.1 % ointment Apply topically 2 (two) times a day. Mix 50/50 with mupirocin ointment. Apply in each nostril twice daily.    montelukast (SINGULAIR) 10 mg tablet TAKE 1 TABLET BY MOUTH  DAILY    ondansetron (ZOFRAN-ODT) 8 MG TbDL Take 1 tablet (8 mg total) by mouth every 8 (eight) hours as needed (nausea).    pantoprazole (PROTONIX) 20 MG tablet TAKE 1 TABLET(20 MG) BY MOUTH TWICE DAILY    polyethylene glycol (GLYCOLAX) 17 gram PwPk Take 17 g by mouth once daily.    traZODone (DESYREL) 50 MG tablet Take 1 tablet (50 mg total) by mouth every evening.    ubrogepant (UBRELVY) 100 mg tablet Take 1 tablet daily as needed for migraine headache. May take 1 additional tablet 2 hours later if needed.    valACYclovir (VALTREX) 1000 MG tablet TAKE 1 TABLET(1000 MG) BY MOUTH EVERY DAY    [DISCONTINUED] hyoscyamine (ANASPAZ,LEVSIN) 0.125 mg Tab TAKE 1 TABLET(125 MCG) BY MOUTH EVERY 8 HOURS AS NEEDED FOR URGENCY       Review of patient's allergies indicates:   Allergen Reactions    Codeine Itching and Nausea And Vomiting    Dilaudid [hydromorphone (bulk)] Other (See Comments)     Oversedating, head burning. Pt prefers to avoid.       Percocet [oxycodone-acetaminophen] Itching    Sulfa (sulfonamide antibiotics) Itching and Nausea And Vomiting    Dilaudid [hydromorphone]     Latex, natural rubber Rash       Past Medical History:   Diagnosis Date    Abdominal pain     Allergic rhinitis     Arthritis     Blood platelet disorder     Blood platelet disorder     Blood transfusion     during delivery and     Bowel obstruction     Cervical radiculopathy     followed by dr cloud    Colon cancer     transverse colon;  resected; Stage IIA (pT3 pN0 MX)    Degenerative joint disease (DJD) of lumbar spine     Diabetes mellitus     Diarrhea     Falls 2020    Family history of breast cancer     Family history of colon cancer     Fatty liver     GERD (gastroesophageal reflux disease)     History of shingles     Hyperlipidemia     Hypomagnesemia     Hypothyroidism     Irritable bowel syndrome     Microscopic colitis     treated     Migraine     Myelodysplastic syndrome     Raynaud phenomenon     Raynaud's disease     Squamous cell carcinoma of skin     Type 2 diabetes mellitus     Usual hyperplasia of lactiferous duct 1970     Past Surgical History:   Procedure Laterality Date    ADENOIDECTOMY      APPENDECTOMY      BACK SURGERY      BREAST BIOPSY  1970    BREAST CYST EXCISION  1970?    BREAST LUMPECTOMY  1970    CARPAL TUNNEL RELEASE      bilateral      SECTION      CHOLECYSTECTOMY  1965    COLECTOMY  2011    Transverse colon resection by Dr. Aguirre    COLONOSCOPY N/A 2017    Procedure: COLONOSCOPY;  Surgeon: Manjit Alvarez MD;  Location: [facility] (4TH FLR);  Service: Endoscopy;  Laterality: N/A;    COLONOSCOPY N/A 2019    Procedure: COLONOSCOPY;  Surgeon: Ramiro Jefferson MD;  Location: [facility] (4TH FLR);  Service: Endoscopy;  Laterality: N/A;  PM prep    COLONOSCOPY N/A 2022    Procedure: COLONOSCOPY;  Surgeon: Ramiro Jefferson MD;  Location: [facility] (2ND FLR);  Service: Endoscopy;  Laterality: N/A;  EGD and colonoscopy in 6-8 weeks from now     states has constipation. -extended Golytely prep    CYSTOSCOPY N/A 2021    Procedure: CYSTOSCOPY;  Surgeon: Viridiana Valenzuela MD;  [facility];  Service: Urology;  Laterality: N/A;    ENDOSCOPIC ULTRASOUND OF UPPER GASTROINTESTINAL TRACT N/A 2018    Procedure: ULTRASOUND, UPPER GI TRACT, ENDOSCOPIC;  Surgeon: Jose Hess MD;  Location: [facility];  Service: Endoscopy;  Laterality: N/A;    ENDOSCOPIC ULTRASOUND OF  UPPER GASTROINTESTINAL TRACT N/A 01/23/2019    Procedure: ULTRASOUND, UPPER GI TRACT, ENDOSCOPIC;  Surgeon: Jose Hess MD;  Location: [facility] (2ND FLR);  Service: Endoscopy;  Laterality: N/A;    ENDOSCOPIC ULTRASOUND OF UPPER GASTROINTESTINAL TRACT N/A 10/25/2023    Procedure: ULTRASOUND, UPPER GI TRACT, ENDOSCOPIC;  Surgeon: Jose Campos MD;  Location: [facility] ENDO;  Service: Endoscopy;  Laterality: N/A;  10/20/23: instructions sent via portal-GD    ESOPHAGOGASTRODUODENOSCOPY N/A 11/16/2018    Procedure: EGD (ESOPHAGOGASTRODUODENOSCOPY);  Surgeon: Angelo Reynolds MD;  Location: [facility] (2ND FLR);  Service: Endoscopy;  Laterality: N/A;    ESOPHAGOGASTRODUODENOSCOPY N/A 06/26/2019    Procedure: EGD (ESOPHAGOGASTRODUODENOSCOPY);  Surgeon: Ramiro Jefferson MD;  Location: [facility] (4TH FLR);  Service: Endoscopy;  Laterality: N/A;    ESOPHAGOGASTRODUODENOSCOPY N/A 05/04/2022    Procedure: EGD (ESOPHAGOGASTRODUODENOSCOPY);  Surgeon: Ramiro Jefferson MD;  Location: [facility] (2ND FLR);  Service: Endoscopy;  Laterality: N/A;  fully vaccinated-GT    ESOPHAGOGASTRODUODENOSCOPY N/A 10/25/2023    Procedure: EGD (ESOPHAGOGASTRODUODENOSCOPY);  Surgeon: Jose Campos MD;  Location: [facility];  Service: Endoscopy;  Laterality: N/A;    ESOPHAGOGASTRODUODENOSCOPY N/A 11/15/2023    Procedure: ESOPHAGOGASTRODUODENOSCOPY (EGD);  Surgeon: Db Sanchez MD;  Location: [facility] (4TH FLR);  Service: Endoscopy;  Laterality: N/A;  New diagnosis of esophageal varices. Not a candidate for beta blocker due to soft blood pressures. Recommend EV banding.  PT/INR done on 11/3/23 and CBC done on 9/30/23  ok per Dr. Espinoza to be done by 1st available provider-see tele encounter-st  1    EYE SURGERY      Cataract Removal    FLUOROSCOPIC URODYNAMIC STUDY N/A 11/09/2021    Procedure: URODYNAMIC STUDY, FLUOROSCOPIC;  Surgeon: Viridiana Valenzuela MD;  Location: [facility] OR [address] [place] [address];  Service: Urology;  Laterality: N/A;  90 minutes      HYSTERECTOMY      JOINT REPLACEMENT      OOPHORECTOMY  1970    posterolateral fusion with autograft bone and Eun mineralized bone matrix  02/01/2013    at [facility] for lumbar spine stenosis    SMALL INTESTINE SURGERY      SPINE SURGERY      TOE SURGERY      TONSILLECTOMY      TRANSFORAMINAL EPIDURAL INJECTION OF STEROID Bilateral 12/21/2022    Procedure: Injection,steroid,epidural, Todd L5/S1 TF CONTRAST DIRECT REFERRAL;  Surgeon: Toni Osorio MD;  Location: [facility] PAIN T;  Service: Pain Management;  Laterality: Bilateral;    TRIGGER FINGER RELEASE       Family History       Problem Relation (Age of Onset)    Alcohol abuse Brother, Brother, Brother    Arthritis Daughter, Brother, Brother    Asthma Daughter, Daughter    Bladder Cancer Mother    Breast cancer Sister (79)    Cataracts Mother    Colon cancer Father, Brother (70)    Depression Daughter, Daughter, Daughter    Glaucoma Mother    Heart disease Father (50), Mother    Hyperlipidemia Mother    Hypertension Daughter    Kidney disease Mother    Parkinsonism Brother    Stroke Daughter (40), Daughter          Tobacco Use    Smoking status: Never    Smokeless tobacco: Never   Substance and Sexual Activity    Alcohol use: Not Currently     Comment: socially, rarely    Drug use: Never    Sexual activity: Not Currently     Review of Systems   Constitutional:  Positive for appetite change. Negative for activity change, chills, diaphoresis, fatigue and fever.   HENT: Negative.     Respiratory:  Negative for cough and shortness of breath.    Cardiovascular:  Negative for chest pain and leg swelling.   Gastrointestinal:  Positive for abdominal pain and nausea. Negative for abdominal distention, constipation, diarrhea and vomiting.   Musculoskeletal: Negative.    Skin: Negative.      Objective:     Vital Signs (Most Recent):  Temp: 98 °F (36.7 °C) (01/13/24 0403)  Pulse: 63 (01/13/24 0403)  Resp: 20 (01/13/24 0403)  BP: (!) 103/51 (01/13/24 0403)  SpO2: 96 % (01/13/24 0403)  Vital Signs (24h Range):  Temp:  [97.7 °F (36.5 °C)-98.9 °F (37.2 °C)] 98 °F (36.7 °C)  Pulse:  [61-99] 63  Resp:  [13-20] 20  SpO2:  [93 %-98 %] 96 %  BP: ()/(51-60) 103/51     Weight: 60.3 kg (133 lb)  Body mass index is 24.33 kg/m².     Physical Exam  Vitals and nursing note reviewed.   Constitutional:       General: She is not in acute distress.     Appearance: Normal appearance. She is not ill-appearing.   HENT:      Nose: Nose normal.      Mouth/Throat:      Mouth: Mucous membranes are moist.   Cardiovascular:      Rate and Rhythm: Normal rate and regular rhythm.   Pulmonary:      Effort: Pulmonary effort is normal. No respiratory distress.   Abdominal:      Comments: Well healed midline scar  Abdomen soft, non-distended  Non-tender to light and deep palpation throughout abdomen   Musculoskeletal:         General: Normal range of motion.   Skin:     General: Skin is warm and dry.   Neurological:      General: No focal deficit present.      Mental Status: She is alert.            I have reviewed all pertinent lab results within the past 24 hours.  CBC:   Recent Labs   Lab 01/12/24  2233   WBC 6.87   RBC 3.40*   HGB 11.9*   HCT 35.2*   PLT 97*   *   MCH 35.0*   MCHC 33.8     CMP:   Recent Labs   Lab 01/12/24  2233   *   CALCIUM 9.8   ALBUMIN 3.4*   PROT 7.2      K 4.4   CO2 22*      BUN 16   CREATININE 1.0   ALKPHOS 87   ALT 27   AST 46*   BILITOT 0.8       Significant Diagnostics:  I have reviewed all pertinent imaging results/findings within the past 24 hours.  CT scan reviewed; demonstrates persistent dilation about her anastomosis, which is largely stable when compared to her previous exams.    Assessment/Plan:     * Partial small bowel obstruction  Anayeli Judd is an 79yoF with a history of DMII, GERD, multiple intra-abdominal surgeries who presents with a recurrent partial small bowel obstruction. She is well known to the general surgery service.     - patient seen and  examined  - labs and imaging reviewed   - normal WBC, normal lactate   - benign physical exam   - CT scan reviewed: largely stable from previous admissions  - place patient in observation  - NPO, mIVFs, symptomatic control  - plan for gastrograffin challenge later today, more for therapeutic benefits  - home meds reviewed and restarted as appropriate  - no acute surgical intervention warranted      VTE Risk Mitigation (From admission, onward)           Ordered     enoxaparin injection 40 mg  Daily         01/13/24 0419     IP VTE HIGH RISK PATIENT  Once         01/13/24 0419     Place sequential compression device  Until discontinued         01/13/24 0419                    Thank you for your consult. I will follow-up with patient. Please contact us if you have any additional questions.    Primo Mendes MD  General Surgery  [facility]

## 2024-01-13 NOTE — ASSESSMENT & PLAN NOTE
Anayeli Judd is an 79yoF with a history of DMII, GERD, multiple intra-abdominal surgeries who presents with a recurrent partial small bowel obstruction. She is well known to the general surgery service.     - patient seen and examined  - labs and imaging reviewed   - normal WBC, normal lactate   - benign physical exam   - CT scan reviewed: largely stable from previous admissions  - place patient in observation  - NPO, mIVFs, symptomatic control  - plan for gastrograffin challenge later today, more for therapeutic benefits  - home meds reviewed and restarted as appropriate  - no acute surgical intervention warranted

## 2024-01-13 NOTE — FIRST PROVIDER EVALUATION
Emergency Department TeleTriage Encounter Note      CHIEF COMPLAINT    Chief Complaint   Patient presents with    Multiple complaints     Abdominal pain, N/V/D, states hx of SBO, lower back pain        VITAL SIGNS   Initial Vitals [01/12/24 1703]   BP Pulse Resp Temp SpO2   130/60 70 18 97.7 °F (36.5 °C) (!) 94 %      MAP       --            ALLERGIES    Review of patient's allergies indicates:   Allergen Reactions    Codeine Itching and Nausea And Vomiting    Dilaudid [hydromorphone (bulk)] Other (See Comments)     Oversedating, head burning. Pt prefers to avoid.       Percocet [oxycodone-acetaminophen] Itching    Sulfa (sulfonamide antibiotics) Itching and Nausea And Vomiting    Dilaudid [hydromorphone]     Latex, natural rubber Rash       PROVIDER TRIAGE NOTE  Onset months ago, was intermittant, states was passing gas.  States pain has greatly increased. Reports hx of spondylosis.  Has been taking gabapentin without relief.      ORDERS  Labs Reviewed   CBC W/ AUTO DIFFERENTIAL   COMPREHENSIVE METABOLIC PANEL   URINALYSIS, REFLEX TO URINE CULTURE   LIPASE       ED Orders (720h ago, onward)      Start Ordered     Status Ordering Provider    01/12/24 1720 01/12/24 1719  CBC auto differential  STAT         Ordered BRAULIO PAULSON    01/12/24 1720 01/12/24 1719  Comprehensive metabolic panel  STAT         Ordered BRAULIO PAULSON    01/12/24 1720 01/12/24 1719  Urinalysis, Reflex to Urine Culture Urine, Clean Catch  STAT         Ordered BRAULIO PAULSON    01/12/24 1720 01/12/24 1719  Lipase  STAT         Ordered BRAULIO PAULSON              Virtual Visit Note: The provider triage portion of this emergency department evaluation and documentation was performed via [employer], a HIPAA-compliant telemedicine application, in concert with a tele-presenter in the room. A face to face patient evaluation with one of my colleagues will occur once the patient is placed in an emergency department  room.      DISCLAIMER: This note was prepared with [employer] voice recognition transcription software. Garbled syntax, mangled pronouns, and other bizarre constructions may be attributed to that software system.

## 2024-01-14 VITALS
SYSTOLIC BLOOD PRESSURE: 110 MMHG | HEIGHT: 62 IN | WEIGHT: 132.94 LBS | OXYGEN SATURATION: 98 % | RESPIRATION RATE: 16 BRPM | DIASTOLIC BLOOD PRESSURE: 53 MMHG | HEART RATE: 60 BPM | BODY MASS INDEX: 24.46 KG/M2 | TEMPERATURE: 98 F

## 2024-01-14 LAB
ANION GAP SERPL CALC-SCNC: 7 MMOL/L (ref 8–16)
BACTERIA UR CULT: ABNORMAL
BUN SERPL-MCNC: 13 MG/DL (ref 8–23)
CALCIUM SERPL-MCNC: 9.1 MG/DL (ref 8.7–10.5)
CHLORIDE SERPL-SCNC: 109 MMOL/L (ref 95–110)
CO2 SERPL-SCNC: 23 MMOL/L (ref 23–29)
CREAT SERPL-MCNC: 1 MG/DL (ref 0.5–1.4)
EST. GFR  (NO RACE VARIABLE): 56.6 ML/MIN/1.73 M^2
GLUCOSE SERPL-MCNC: 157 MG/DL (ref 70–110)
MAGNESIUM SERPL-MCNC: 1.5 MG/DL (ref 1.6–2.6)
PHOSPHATE SERPL-MCNC: 4.2 MG/DL (ref 2.7–4.5)
POCT GLUCOSE: 144 MG/DL (ref 70–110)
POCT GLUCOSE: 253 MG/DL (ref 70–110)
POTASSIUM SERPL-SCNC: 4.2 MMOL/L (ref 3.5–5.1)
SODIUM SERPL-SCNC: 139 MMOL/L (ref 136–145)

## 2024-01-14 PROCEDURE — 25000003 PHARM REV CODE 250

## 2024-01-14 PROCEDURE — 83735 ASSAY OF MAGNESIUM: CPT | Performed by: STUDENT IN AN ORGANIZED HEALTH CARE EDUCATION/TRAINING PROGRAM

## 2024-01-14 PROCEDURE — 25000003 PHARM REV CODE 250: Performed by: STUDENT IN AN ORGANIZED HEALTH CARE EDUCATION/TRAINING PROGRAM

## 2024-01-14 PROCEDURE — 84100 ASSAY OF PHOSPHORUS: CPT | Performed by: STUDENT IN AN ORGANIZED HEALTH CARE EDUCATION/TRAINING PROGRAM

## 2024-01-14 PROCEDURE — 63600175 PHARM REV CODE 636 W HCPCS: Performed by: STUDENT IN AN ORGANIZED HEALTH CARE EDUCATION/TRAINING PROGRAM

## 2024-01-14 PROCEDURE — 36415 COLL VENOUS BLD VENIPUNCTURE: CPT | Performed by: STUDENT IN AN ORGANIZED HEALTH CARE EDUCATION/TRAINING PROGRAM

## 2024-01-14 PROCEDURE — 80048 BASIC METABOLIC PNL TOTAL CA: CPT | Performed by: STUDENT IN AN ORGANIZED HEALTH CARE EDUCATION/TRAINING PROGRAM

## 2024-01-14 RX ORDER — INSULIN ASPART 100 [IU]/ML
0-10 INJECTION, SOLUTION INTRAVENOUS; SUBCUTANEOUS EVERY 6 HOURS PRN
Status: DISCONTINUED | OUTPATIENT
Start: 2024-01-14 | End: 2024-01-14 | Stop reason: HOSPADM

## 2024-01-14 RX ORDER — GLUCAGON 1 MG
1 KIT INJECTION
Status: DISCONTINUED | OUTPATIENT
Start: 2024-01-14 | End: 2024-01-14 | Stop reason: HOSPADM

## 2024-01-14 RX ADMIN — LEVOTHYROXINE SODIUM 75 MCG: 75 TABLET ORAL at 04:01

## 2024-01-14 RX ADMIN — DULOXETINE HYDROCHLORIDE 30 MG: 30 CAPSULE, DELAYED RELEASE ORAL at 09:01

## 2024-01-14 RX ADMIN — PANTOPRAZOLE SODIUM 20 MG: 20 TABLET, DELAYED RELEASE ORAL at 09:01

## 2024-01-14 RX ADMIN — ACETAMINOPHEN 650 MG: 325 TABLET ORAL at 03:01

## 2024-01-14 RX ADMIN — INSULIN ASPART 6 UNITS: 100 INJECTION, SOLUTION INTRAVENOUS; SUBCUTANEOUS at 01:01

## 2024-01-14 RX ADMIN — POTASSIUM CHLORIDE, DEXTROSE MONOHYDRATE AND SODIUM CHLORIDE 50 ML/HR: 150; 5; 450 INJECTION, SOLUTION INTRAVENOUS at 12:01

## 2024-01-14 NOTE — PROGRESS NOTES
[facility]  General Surgery  Progress Note    Subjective:     History of Present Illness:  Anayeli Judd is an 79yoF with a past medical history significant for DMII, GERD, multiple intra-abdominal surgeries with recurrent partial small bowel obstructions who presents to the emergency room with complaints of nausea and abdominal pain. The patient is well-known to the general surgery service with three admissions during 2023 for partial small bowel obstructions, all of which were managed conservatively. The patient describes an indolent onset of gradually progressive abdominal pain that comes in waves. She describes associated nausea, which improves with zofran. She continues to endorse ongoing bowel function. Work up in the emergency room demonstrates a normal WBC, normal chemistry panel, UA consistent with a UTI and CT scan which continues to demonstrate dilated bowel at the site of her anastomosis.     On my exam, the patient is resting comfortably in bed. She states that her abdominal pain has improved since her presentation. She continues with bowel function. Plan of care discussed with the patient, who states that she would be more comfortable being observed in the hospital overnight.     Post-Op Info:  * No surgery found *         Interval History: No issues overnight. Gastrografin given with contrast in the colon on 4 hr KUB. Having bowel function.     Medications:  Continuous Infusions:   dextrose 5 % and 0.45 % NaCl with KCl 20 mEq 50 mL/hr (01/14/24 0020)     Scheduled Meds:   donepeziL  10 mg Oral QHS    DULoxetine  30 mg Oral BID    enoxparin  40 mg Subcutaneous Daily    levothyroxine  75 mcg Oral Before breakfast    pantoprazole  20 mg Oral BID     PRN Meds:acetaminophen, melatonin, ondansetron, promethazine     Review of patient's allergies indicates:   Allergen Reactions    Codeine Itching and Nausea And Vomiting    Dilaudid [hydromorphone (bulk)] Other (See Comments)     Oversedating, head  burning. Pt prefers to avoid.       Percocet [oxycodone-acetaminophen] Itching    Sulfa (sulfonamide antibiotics) Itching and Nausea And Vomiting    Dilaudid [hydromorphone]     Latex, natural rubber Rash     Objective:     Vital Signs (Most Recent):  Temp: 97.8 °F (36.6 °C) (01/14/24 0758)  Pulse: 90 (01/14/24 0758)  Resp: 17 (01/14/24 0758)  BP: 122/68 (01/14/24 0758)  SpO2: 97 % (01/14/24 0758) Vital Signs (24h Range):  Temp:  [97.4 °F (36.3 °C)-98.7 °F (37.1 °C)] 97.8 °F (36.6 °C)  Pulse:  [56-90] 90  Resp:  [16-18] 17  SpO2:  [93 %-97 %] 97 %  BP: ()/(45-68) 122/68     Weight: 60.3 kg (132 lb 15 oz)  Body mass index is 24.31 kg/m².    Intake/Output - Last 3 Shifts         01/12 0700  01/13 0659 01/13 0700  01/14 0659 01/14 0700  01/15 0659    IV Piggyback 100      Total Intake(mL/kg) 100 (1.7)      Net +100             Stool Occurrence  0 x              Physical Exam  Vitals and nursing note reviewed.   Constitutional:       General: She is not in acute distress.     Appearance: Normal appearance. She is not ill-appearing.   HENT:      Nose: Nose normal.      Mouth/Throat:      Mouth: Mucous membranes are moist.   Cardiovascular:      Rate and Rhythm: Normal rate and regular rhythm.   Pulmonary:      Effort: Pulmonary effort is normal. No respiratory distress.   Abdominal:      Comments: Well healed midline scar  Abdomen soft, non-distended  Non-tender to light and deep palpation throughout abdomen   Musculoskeletal:         General: Normal range of motion.   Skin:     General: Skin is warm and dry.   Neurological:      General: No focal deficit present.      Mental Status: She is alert.          Significant Labs:  I have reviewed all pertinent lab results within the past 24 hours.  CBC:   Recent Labs   Lab 01/12/24  2233   WBC 6.87   RBC 3.40*   HGB 11.9*   HCT 35.2*   PLT 97*   *   MCH 35.0*   MCHC 33.8     CMP:   Recent Labs   Lab 01/12/24  2233 01/14/24  0609   * 157*   CALCIUM 9.8 9.1    ALBUMIN 3.4*  --    PROT 7.2  --     139   K 4.4 4.2   CO2 22* 23    109   BUN 16 13   CREATININE 1.0 1.0   ALKPHOS 87  --    ALT 27  --    AST 46*  --    BILITOT 0.8  --        Significant Diagnostics:  I have reviewed all pertinent imaging results/findings within the past 24 hours.  Assessment/Plan:     * Partial small bowel obstruction  79 y.o. female with a history of DMII, GERD, multiple intra-abdominal surgeries who presents with a recurrent partial small bowel obstruction. She is well known to the general surgery service. CT scan reviewed: largely stable from previous admissions    - 4 hr KUB after gastrografin with contrast in the colon. Having bowel function.   - regular diet  - home meds  - Discharge after tolerating diet        Amanda Santana MD  General Surgery  [facility]

## 2024-01-14 NOTE — ED NOTES
Pt ambulated to bathroom with assistance.  Pt readjusted in bed for comfort.  Pt states no other needs at this time.

## 2024-01-14 NOTE — PLAN OF CARE
[facility]    HOME HEALTH ORDERS  FACE TO FACE ENCOUNTER    Patient Name: Anayeli Judd  YOB: 1945    PCP: Darci Guthrie MD   PCP Address: [address] / [place] [address]  PCP Phone Number: [phone]  PCP Fax: [phone]    Encounter Date: 1/12/24    Admit to Home Health    Diagnoses:  Active Hospital Problems    Diagnosis  POA    *Partial small bowel obstruction [K56.600]  Yes      Resolved Hospital Problems   No resolved problems to display.       Follow Up Appointments:  Future Appointments   Date Time Provider Department Center   1/22/2024  8:00 AM Elissa Canseco NP [address]   2/7/2024  3:30 PM Jaqueline Espinoza MD [facility]PAT [facility]       Allergies:  Review of patient's allergies indicates:   Allergen Reactions    Codeine Itching and Nausea And Vomiting    Dilaudid [hydromorphone (bulk)] Other (See Comments)     Oversedating, head burning. Pt prefers to avoid.       Percocet [oxycodone-acetaminophen] Itching    Sulfa (sulfonamide antibiotics) Itching and Nausea And Vomiting    Dilaudid [hydromorphone]     Latex, natural rubber Rash       Medications: Review discharge medications with patient and family and provide education.    Current Facility-Administered Medications   Medication Dose Route Frequency Provider Last Rate Last Admin    acetaminophen tablet 650 mg  650 mg Oral Q6H PRN Kate Hauser MD   650 mg at 01/14/24 0345    donepeziL tablet 10 mg  10 mg Oral QHS Primo Mendes MD   10 mg at 01/13/24 2251    DULoxetine DR capsule 30 mg  30 mg Oral BID Primo Mendes MD   30 mg at 01/14/24 0919    enoxaparin injection 40 mg  40 mg Subcutaneous Daily Primo Mendes MD   40 mg at 01/13/24 1718    levothyroxine tablet 75 mcg  75 mcg Oral Before breakfast Primo Mendes MD   75 mcg at 01/14/24 0446    melatonin tablet 6 mg  6 mg Oral Nightly PRN Primo Mendes MD   6 mg at 01/13/24 2249    ondansetron disintegrating  tablet 8 mg  8 mg Oral Q8H PRN Primo Mendes MD        pantoprazole EC tablet 20 mg  20 mg Oral BID Primo Mendes MD   20 mg at 01/14/24 0919    promethazine tablet 25 mg  25 mg Oral Q6H PRN Primo Mendes MD   25 mg at 01/13/24 1208     Current Discharge Medication List        CONTINUE these medications which have NOT CHANGED    Details   acetaminophen (TYLENOL) 650 MG TbSR Take 1,300 mg by mouth 2 (two) times a day.      ascorbic acid, vitamin C, (VITAMIN C) 1000 MG tablet Take 1,000 mg by mouth once daily.      atorvastatin (LIPITOR) 40 MG tablet Take 40 mg by mouth once daily.      biotin 10,000 mcg TbDL Take 1 tablet by mouth once daily.       blood sugar diagnostic (FREESTYLE LITE STRIPS) Strp 1 strip by Misc.(Non-Drug; Combo Route) route 3 (three) times daily.  Qty: 200 strip, Refills: 5    Comments: Okay to fill whichever brand covered by insurance.  Associated Diagnoses: Type 2 diabetes mellitus without complication, without long-term current use of insulin      blood-glucose meter kit Use as instructed  Qty: 1 each, Refills: 0    Associated Diagnoses: Type 2 diabetes mellitus without complication, without long-term current use of insulin      calcium-vitamin D3 (OS-KASSANDRA 500 + D3) 500 mg-5 mcg (200 unit) per tablet Take 1 tablet by mouth once daily.      conjugated estrogens (PREMARIN) vaginal cream INSERT 0.5 GRAM VAGINALLY 2 TIMES A WEEK  Qty: 30 g, Refills: 3    Associated Diagnoses: Vaginal atrophy      cranberry extract 200 mg Cap Take 1 capsule by mouth once daily.      donepeziL (ARICEPT) 10 MG tablet TAKE 1 TABLET(10 MG) BY MOUTH EVERY EVENING  Qty: 90 tablet, Refills: 3      DULoxetine (CYMBALTA) 30 MG capsule TAKE 1 CAPSULE BY MOUTH TWICE  DAILY  Qty: 180 capsule, Refills: 3    Comments: Requesting 1 year supply      ferrous sulfate 324 mg (65 mg iron) TbEC Take 1 tablet (324 mg total) by mouth once daily.  Refills: 0      flash glucose scanning reader (FREESTYLE EFREN 14 DAY  READER) Misc Check blood glucose level 3 times daily.  Qty: 1 each, Refills: 0    Associated Diagnoses: Type 2 diabetes mellitus without complication, without long-term current use of insulin      flash glucose sensor (FREESTYLE EFREN 14 DAY SENSOR) Kit 1 application  by Misc.(Non-Drug; Combo Route) route every 14 (fourteen) days. Disp 30 or 90 day refill  Qty: 6 kit, Refills: 3    Associated Diagnoses: Type 2 diabetes mellitus without complication, without long-term current use of insulin      gabapentin (NEURONTIN) 300 MG capsule Take 1 to 2 capsules 3 times daily if needed for leg pain.  Qty: 180 capsule, Refills: 5      HYDROcodone-acetaminophen (NORCO) 5-325 mg per tablet Take 1 tablet by mouth every 6 (six) hours as needed for Pain.  Qty: 28 tablet, Refills: 0    Comments: Quantity prescribed more than 7 day supply? No  Associated Diagnoses: Abdominal pain, unspecified abdominal location      hydrocortisone 2.5 % cream Apply topically 2 (two) times daily as needed.  Qty: 1 each, Refills: 1      lancets (FREESTYLE LANCETS) 28 gauge Misc 30 lancets by Misc.(Non-Drug; Combo Route) route Daily.  Qty: 30 each, Refills: 3      !! levothyroxine (SYNTHROID) 75 MCG tablet TAKE 1 TABLET(75 MCG) BY MOUTH BEFORE BREAKFAST  Qty: 90 tablet, Refills: 3    Comments: **Patient requests 90 days supply**  Associated Diagnoses: Hypothyroidism, unspecified type      !! levothyroxine (SYNTHROID) 75 MCG tablet Take 1 tablet (75 mcg total) by mouth before breakfast.  Qty: 90 tablet, Refills: 3    Associated Diagnoses: Hypothyroidism, unspecified type      LIDOcaine (LIDODERM) 5 % APPLY 1 TO 3 PATCHES TO BACK DAILY. REMOVE WITHIN 12 HOURS  Qty: 30 patch, Refills: 2    Associated Diagnoses: Back pain, unspecified back location, unspecified back pain laterality, unspecified chronicity      linaGLIPtin (TRADJENTA) 5 mg Tab tablet Take 1 tablet (5 mg total) by mouth once daily.  Qty: 90 tablet, Refills: 3    Associated Diagnoses: Type 2  diabetes mellitus with other specified complication, without long-term current use of insulin      loperamide (IMODIUM) 2 mg capsule Take 1 capsule (2 mg total) by mouth 2 (two) times daily as needed for Diarrhea.  Qty: 30 capsule, Refills: 1    Associated Diagnoses: Diarrhea, unspecified type      magnesium 30 mg Tab Take 30 mg by mouth once. Patient does not know actual dose in MG      metFORMIN (GLUCOPHAGE) 500 MG tablet Take 1 tablet (500 mg total) by mouth 2 (two) times daily with meals.  Qty: 180 tablet, Refills: 3    Associated Diagnoses: Type 2 diabetes mellitus with other specified complication, without long-term current use of insulin      mometasone (ELOCON) 0.1 % ointment Apply topically 2 (two) times a day. Mix 50/50 with mupirocin ointment. Apply in each nostril twice daily.  Qty: 15 g, Refills: 1    Associated Diagnoses: Rhinitis, unspecified type; Bleeding nose      montelukast (SINGULAIR) 10 mg tablet TAKE 1 TABLET BY MOUTH  DAILY  Qty: 90 tablet, Refills: 3    Comments: Requesting 1 year supply      ondansetron (ZOFRAN-ODT) 8 MG TbDL Take 1 tablet (8 mg total) by mouth every 8 (eight) hours as needed (nausea).  Qty: 30 tablet, Refills: 0      pantoprazole (PROTONIX) 20 MG tablet TAKE 1 TABLET(20 MG) BY MOUTH TWICE DAILY  Qty: 180 tablet, Refills: 1    Associated Diagnoses: Gastroesophageal reflux disease, unspecified whether esophagitis present      polyethylene glycol (GLYCOLAX) 17 gram PwPk Take 17 g by mouth once daily.  Qty: 90 each, Refills: 3    Comments: Please dispense Polyethylene glycol powder (not Miralax formulation)      traZODone (DESYREL) 50 MG tablet Take 1 tablet (50 mg total) by mouth every evening.  Qty: 90 tablet, Refills: 1    Associated Diagnoses: Insomnia, unspecified type      ubrogepant (UBRELVY) 100 mg tablet Take 1 tablet daily as needed for migraine headache. May take 1 additional tablet 2 hours later if needed.  Qty: 16 tablet, Refills: 2      valACYclovir (VALTREX)  1000 MG tablet TAKE 1 TABLET(1000 MG) BY MOUTH EVERY DAY  Qty: 30 tablet, Refills: 2    Associated Diagnoses: Herpes zoster without complication       !! - Potential duplicate medications found. Please discuss with provider.            I have seen and examined this patient within the last 30 days. My clinical findings that support the need for the home health skilled services and home bound status are the following:no   Weakness/numbness causing balance and gait disturbance due to Weakness/Debility making it taxing to leave home.     Diet:   regular diet    Referrals/ Consults  Physical Therapy to evaluate and treat. Evaluate for home safety and equipment needs; Establish/upgrade home exercise program. Perform / instruct on therapeutic exercises, gait training, transfer training, and Range of Motion.  Occupational Therapy to evaluate and treat. Evaluate home environment for safety and equipment needs. Perform/Instruct on transfers, ADL training, ROM, and therapeutic exercises.    Activities:   activity as tolerated and ambulate in house     Nursing:   Agency to admit patient within 24 hours of hospital discharge unless specified on physician order or at patient request    SN to complete comprehensive assessment including routine vital signs. Instruct on disease process and s/s of complications to report to MD. Review/verify medication list sent home with the patient at time of discharge  and instruct patient/caregiver as needed. Frequency may be adjusted depending on start of care date.     Skilled nurse to perform up to 3 visits PRN for symptoms related to diagnosis    Notify MD if SBP > 160 or < 90; DBP > 90 or < 50; HR > 120 or < 50; Temp > 101; O2 < 88%    Ok to schedule additional visits based on staff availability and patient request on consecutive days within the home health episode.    When multiple disciplines ordered:    Start of Care occurs on Sunday - Wednesday schedule remaining discipline evaluations  as ordered on separate consecutive days following the start of care.    Thursday SOC -schedule subsequent evaluations Friday and Monday the following week.     Friday - Saturday SOC - schedule subsequent discipline evaluations on consecutive days starting Monday of the following week.    Miscellaneous   Routine Skin for Bedridden Patients: Instruct patient/caregiver to apply moisture barrier cream to all skin folds and wet areas in perineal area daily and after baths and all bowel movements.  Diabetic Care:   SN to perform and educate Diabetic management with blood glucose monitoring:, Fingerstick blood sugar a.m. and p.m., and Report CBG < 60 or > 350 to physician.    Home Health Aide:  Nursing Twice weekly, Physical Therapy Twice weekly, and Occupational Therapy Twice weekly    Please resume any home health orders previous to this admission       I certify that this patient is confined to her home and needs intermittent skilled nursing care, physical therapy, and speech therapy.

## 2024-01-14 NOTE — NURSING
Pt tolerated her diet without nausea- no ABD pain , consumed 25-50% of her meals- she said she does not like hospital food so cannot eat more. Has had at least 3 BMs so far.  Discharge instructions given to pt, no questions at this time.

## 2024-01-14 NOTE — ASSESSMENT & PLAN NOTE
79 y.o. female with a history of DMII, GERD, multiple intra-abdominal surgeries who presents with a recurrent partial small bowel obstruction. She is well known to the general surgery service. CT scan reviewed: largely stable from previous admissions    - 4 hr KUB after gastrografin with contrast in the colon. Having bowel function.   - regular diet  - home meds  - Discharge after tolerating diet

## 2024-01-14 NOTE — PLAN OF CARE
CM sent \[facility] [place] [facility]\"" HH orders and packet to [facility] via [facility] to review for resumption of care. CM following for acceptance . AVS updated.    01/14/24 0948   Post-Acute Status   Post-Acute Authorization Home Health   Home Health Status Referrals Sent   Patient choice form signed by patient/caregiver List with quality metrics by geographic area provided

## 2024-01-14 NOTE — SUBJECTIVE & OBJECTIVE
Interval History: No issues overnight. Gastrografin given with contrast in the colon on 4 hr KUB. Having bowel function.     Medications:  Continuous Infusions:   dextrose 5 % and 0.45 % NaCl with KCl 20 mEq 50 mL/hr (01/14/24 0020)     Scheduled Meds:   donepeziL  10 mg Oral QHS    DULoxetine  30 mg Oral BID    enoxparin  40 mg Subcutaneous Daily    levothyroxine  75 mcg Oral Before breakfast    pantoprazole  20 mg Oral BID     PRN Meds:acetaminophen, melatonin, ondansetron, promethazine     Review of patient's allergies indicates:   Allergen Reactions    Codeine Itching and Nausea And Vomiting    Dilaudid [hydromorphone (bulk)] Other (See Comments)     Oversedating, head burning. Pt prefers to avoid.       Percocet [oxycodone-acetaminophen] Itching    Sulfa (sulfonamide antibiotics) Itching and Nausea And Vomiting    Dilaudid [hydromorphone]     Latex, natural rubber Rash     Objective:     Vital Signs (Most Recent):  Temp: 97.8 °F (36.6 °C) (01/14/24 0758)  Pulse: 90 (01/14/24 0758)  Resp: 17 (01/14/24 0758)  BP: 122/68 (01/14/24 0758)  SpO2: 97 % (01/14/24 0758) Vital Signs (24h Range):  Temp:  [97.4 °F (36.3 °C)-98.7 °F (37.1 °C)] 97.8 °F (36.6 °C)  Pulse:  [56-90] 90  Resp:  [16-18] 17  SpO2:  [93 %-97 %] 97 %  BP: ()/(45-68) 122/68     Weight: 60.3 kg (132 lb 15 oz)  Body mass index is 24.31 kg/m².    Intake/Output - Last 3 Shifts         01/12 0700  01/13 0659 01/13 0700 01/14 0659 01/14 0700  01/15 0659    IV Piggyback 100      Total Intake(mL/kg) 100 (1.7)      Net +100             Stool Occurrence  0 x              Physical Exam  Vitals and nursing note reviewed.   Constitutional:       General: She is not in acute distress.     Appearance: Normal appearance. She is not ill-appearing.   HENT:      Nose: Nose normal.      Mouth/Throat:      Mouth: Mucous membranes are moist.   Cardiovascular:      Rate and Rhythm: Normal rate and regular rhythm.   Pulmonary:      Effort: Pulmonary effort is normal.  No respiratory distress.   Abdominal:      Comments: Well healed midline scar  Abdomen soft, non-distended  Non-tender to light and deep palpation throughout abdomen   Musculoskeletal:         General: Normal range of motion.   Skin:     General: Skin is warm and dry.   Neurological:      General: No focal deficit present.      Mental Status: She is alert.          Significant Labs:  I have reviewed all pertinent lab results within the past 24 hours.  CBC:   Recent Labs   Lab 01/12/24 2233   WBC 6.87   RBC 3.40*   HGB 11.9*   HCT 35.2*   PLT 97*   *   MCH 35.0*   MCHC 33.8     CMP:   Recent Labs   Lab 01/12/24 2233 01/14/24  0609   * 157*   CALCIUM 9.8 9.1   ALBUMIN 3.4*  --    PROT 7.2  --     139   K 4.4 4.2   CO2 22* 23    109   BUN 16 13   CREATININE 1.0 1.0   ALKPHOS 87  --    ALT 27  --    AST 46*  --    BILITOT 0.8  --        Significant Diagnostics:  I have reviewed all pertinent imaging results/findings within the past 24 hours.

## 2024-01-14 NOTE — DISCHARGE SUMMARY
[facility]  General Surgery  Discharge Summary      Patient Name: Anayeli Judd  MRN: [id number]  Admission Date: 1/12/2024  Hospital Length of Stay: 1 days  Discharge Date and Time: No discharge date for patient encounter.  Attending Physician: Rojelio Donaldson MD   Discharging Provider: Amanda Santana MD  Primary Care Provider: Darci Guthrie MD    HPI:   Anayeli Judd is an 79yoF with a past medical history significant for DMII, GERD, multiple intra-abdominal surgeries with recurrent partial small bowel obstructions who presents to the emergency room with complaints of nausea and abdominal pain. The patient is well-known to the general surgery service with three admissions during 2023 for partial small bowel obstructions, all of which were managed conservatively. The patient describes an indolent onset of gradually progressive abdominal pain that comes in waves. She describes associated nausea, which improves with zofran. She continues to endorse ongoing bowel function. Work up in the emergency room demonstrates a normal WBC, normal chemistry panel, UA consistent with a UTI and CT scan which continues to demonstrate dilated bowel at the site of her anastomosis.     On my exam, the patient is resting comfortably in bed. She states that her abdominal pain has improved since her presentation. She continues with bowel function. Plan of care discussed with the patient, who states that she would be more comfortable being observed in the hospital overnight.     * No surgery found *      Indwelling Lines/Drains at time of discharge:   Lines/Drains/Airways       None                 Hospital Course: Anayeli Judd is a 79 y.o. female admitted 1/12/2024 with a pSBO. This was managed conservatively with bowel rest and GGC. She did not require NGT placement. The 4 hour KUB after gastrografin was given demonstrated contrast in the colon. She had return of bowel function. Her diet was advanced and was tolerated.  She wad discharged to home in good condition on hospital day 2. Follow up in the general surgery clinic as needed.    Goals of Care Treatment Preferences:  Code Status: Full Code    Living Will: Yes              Consults:   Consults (From admission, onward)          Status Ordering Provider     Inpatient consult to General surgery  Once        Provider:  (Not yet assigned)    Completed GAIL BARRETT            Significant Diagnostic Studies: N/A  See HPI and hospital course    Pending Diagnostic Studies:       Procedure Component Value Units Date/Time    X-Ray Abdomen AP 1 View [[id number]]     Order Status: Sent Lab Status: No result           Final Active Diagnoses:    Diagnosis Date Noted POA    PRINCIPAL PROBLEM:  Partial small bowel obstruction [K56.600] 09/01/2014 Yes      Problems Resolved During this Admission:      Discharged Condition: good    Disposition: Home or Self Care    Follow Up:    Patient Instructions:      Activity as tolerated     Medications:  Reconciled Home Medications:      Medication List        CONTINUE taking these medications      acetaminophen 650 MG Tbsr  Commonly known as: TYLENOL  Take 1,300 mg by mouth 2 (two) times a day.     ascorbic acid (vitamin C) 1000 MG tablet  Commonly known as: VITAMIN C  Take 1,000 mg by mouth once daily.     atorvastatin 40 MG tablet  Commonly known as: LIPITOR  Take 40 mg by mouth once daily.     biotin 10,000 mcg Tbdl  Take 1 tablet by mouth once daily.     blood sugar diagnostic Strp  Commonly known as: FREESTYLE LITE STRIPS  1 strip by Misc.(Non-Drug; Combo Route) route 3 (three) times daily.     blood-glucose meter kit  Use as instructed     calcium-vitamin D3 500 mg-5 mcg (200 unit) per tablet  Commonly known as: OS-KASSANDRA 500 + D3  Take 1 tablet by mouth once daily.     cranberry extract 200 mg Cap  Take 1 capsule by mouth once daily.     donepeziL 10 MG tablet  Commonly known as: ARICEPT  TAKE 1 TABLET(10 MG) BY MOUTH EVERY EVENING      DULoxetine 30 MG capsule  Commonly known as: CYMBALTA  TAKE 1 CAPSULE BY MOUTH TWICE  DAILY     ferrous sulfate 324 mg (65 mg iron) Tbec  Take 1 tablet (324 mg total) by mouth once daily.     FREESTYLE EFREN 14 DAY READER Misc  Generic drug: flash glucose scanning reader  Check blood glucose level 3 times daily.     FREESTYLE EFREN 14 DAY SENSOR Kit  Generic drug: flash glucose sensor  1 application  by Misc.(Non-Drug; Combo Route) route every 14 (fourteen) days. Disp 30 or 90 day refill     gabapentin 300 MG capsule  Commonly known as: NEURONTIN  Take 1 to 2 capsules 3 times daily if needed for leg pain.     HYDROcodone-acetaminophen 5-325 mg per tablet  Commonly known as: NORCO  Take 1 tablet by mouth every 6 (six) hours as needed for Pain.     hydrocortisone 2.5 % cream  Apply topically 2 (two) times daily as needed.     lancets 28 gauge Misc  Commonly known as: FREESTYLE LANCETS  30 lancets by Misc.(Non-Drug; Combo Route) route Daily.     * levothyroxine 75 MCG tablet  Commonly known as: SYNTHROID  TAKE 1 TABLET(75 MCG) BY MOUTH BEFORE BREAKFAST     * levothyroxine 75 MCG tablet  Commonly known as: SYNTHROID  Take 1 tablet (75 mcg total) by mouth before breakfast.     LIDOcaine 5 %  Commonly known as: LIDODERM  APPLY 1 TO 3 PATCHES TO BACK DAILY. REMOVE WITHIN 12 HOURS     linaGLIPtin 5 mg Tab tablet  Commonly known as: TRADJENTA  Take 1 tablet (5 mg total) by mouth once daily.     loperamide 2 mg capsule  Commonly known as: IMODIUM  Take 1 capsule (2 mg total) by mouth 2 (two) times daily as needed for Diarrhea.     magnesium 30 mg Tab  Take 30 mg by mouth once. Patient does not know actual dose in MG     metFORMIN 500 MG tablet  Commonly known as: GLUCOPHAGE  Take 1 tablet (500 mg total) by mouth 2 (two) times daily with meals.     mometasone 0.1 % ointment  Commonly known as: ELOCON  Apply topically 2 (two) times a day. Mix 50/50 with mupirocin ointment. Apply in each nostril twice daily.     montelukast 10  mg tablet  Commonly known as: SINGULAIR  TAKE 1 TABLET BY MOUTH  DAILY     ondansetron 8 MG Tbdl  Commonly known as: ZOFRAN-ODT  Take 1 tablet (8 mg total) by mouth every 8 (eight) hours as needed (nausea).     pantoprazole 20 MG tablet  Commonly known as: PROTONIX  TAKE 1 TABLET(20 MG) BY MOUTH TWICE DAILY     polyethylene glycol 17 gram Pwpk  Commonly known as: GLYCOLAX  Take 17 g by mouth once daily.     PREMARIN vaginal cream  Generic drug: conjugated estrogens  INSERT 0.5 GRAM VAGINALLY 2 TIMES A WEEK     traZODone 50 MG tablet  Commonly known as: DESYREL  Take 1 tablet (50 mg total) by mouth every evening.     UBRELVY 100 mg tablet  Generic drug: ubrogepant  Take 1 tablet daily as needed for migraine headache. May take 1 additional tablet 2 hours later if needed.     valACYclovir 1000 MG tablet  Commonly known as: VALTREX  TAKE 1 TABLET(1000 MG) BY MOUTH EVERY DAY           * This list has 2 medication(s) that are the same as other medications prescribed for you. Read the directions carefully, and ask your doctor or other care provider to review them with you.                Time spent on the discharge of patient: 10 minutes    Amanda Santana MD  General Surgery  [facility]

## 2024-01-14 NOTE — NURSING
Report called to Angelita for patiharriet to be admitted to 1021. Transportation arranged for patient to move in her hospital bed.  IV intact, no distress noted.

## 2024-01-14 NOTE — PLAN OF CARE
[facility]  Discharge Final Note    Primary Care Provider: Darci Guthrie MD    Expected Discharge Date: 1/14/2024  The pt is cleared for discharge home from case management with resumption of [facility].     Final Discharge Note (most recent)       Final Note - 01/14/24 0946          Final Note    Assessment Type Final Discharge Note     Anticipated Discharge Disposition Home-Health Care Svc     What phone number can be called within the next 1-3 days to see how you are doing after discharge? [phone]     Hospital Resources/Appts/Education Provided Post-Acute resouces added to AVS        Post-Acute Status    Post-Acute Authorization Home Health     Home Health Status Set-up Complete/Auth obtained     Patient choice form signed by patient/caregiver List with quality metrics by geographic area provided     Discharge Delays None known at this time                     Important Message from Medicare             Contact Info       [facility]   Specialty: Home Health Services, Home Therapy Services, Home Living Aide Services    [address]  [place] [address]   Phone: [phone]       Next Steps: Follow up    Instructions: Home Health    Darci Guthrie MD   Specialty: Internal Medicine   Relationship: PCP - General    [address]  [facility] [address]   Phone: [phone]       Next Steps: Schedule an appointment as soon as possible for a visit    Instructions: Please call to schedule a hospital follow up appointment

## 2024-01-14 NOTE — PLAN OF CARE
The pt is accepted for resumption [facility] with [facility]    01/14/24 0944   Post-Acute Status   Post-Acute Authorization Home Health   Home Health Status Set-up Complete/Auth obtained

## 2024-01-14 NOTE — HOSPITAL COURSE
Anayeli Judd is a 79 y.o. female admitted 1/12/2024 with a pSBO. This was managed conservatively with bowel rest and GGC. She did not require NGT placement. The 4 hour KUB after gastrografin was given demonstrated contrast in the colon. She had return of bowel function. Her diet was advanced and was tolerated. She wad discharged to home in good condition on hospital day 2. Follow up in the general surgery clinic as needed.

## 2024-01-14 NOTE — PLAN OF CARE
Matias HUDSON  Initial Discharge Assessment       Primary Care Provider: Darci Guthrie MD    Admission Diagnosis: SBO (small bowel obstruction) [K56.609]  Nausea & vomiting [R11.2]  Hypoxia [R09.02]  Partial bowel obstruction [K56.600]    Admission Date: 1/12/2024  Expected Discharge Date: 1/14/2024    PT confirmed home address and uses home cane and walker. Pt is active with [facility] . PT confirmed PCP, insurance and pharmacy as [facility] [address]. Pt has private sitters 8 hours a day and they will provide transport home as well.  PTs discharge plan is home with resumption of [facility].   Transition of Care Barriers: None    Payor: MEDICARE / Plan: MEDICARE PART A & B / Product Type: Government /     Extended Emergency Contact Information  Primary Emergency Contact: Danielle Whitley   [place]  Mobile Phone: [phone]  Relation: Daughter  Secondary Emergency Contact: Kimberly Horner  Address: [address] [address] [facility]  Home Phone: [phone]  Relation: Daughter    Discharge Plan A: Home, Home Health  Discharge Plan B: Home with family      [id number] - [place], [place] - [address] AT [facility]  [address] [facility] [address]  Phone: [phone] Fax: [phone]    [facility] - [place], [place] - [address]  [address]  [facility] [address]  [facility] [address]  Phone: [phone] Fax: [phone]    [id number] - [place], [place] - [address] AT [facility]  [address]  [place] [address]  Phone: [phone] Fax: [phone]      Initial Assessment (most recent)       Adult Discharge Assessment - 01/14/24 0911          Discharge Assessment    Assessment Type Discharge Planning Assessment     Confirmed/corrected address, phone number and insurance Yes     Confirmed Demographics Correct on  Facesheet     Source of Information patient     If unable to respond/provide information was family/caregiver contacted? Yes     Communicated LOI with patient/caregiver Yes     Reason For Admission SBO     People in Home alone     Facility Arrived From: Home     Do you expect to return to your current living situation? Yes     Do you have help at home or someone to help you manage your care at home? Yes     Who are your caregiver(s) and their phone number(s)? Private sitters 8 hours a day     Prior to hospitilization cognitive status: Alert/Oriented     Current cognitive status: Alert/Oriented     Walking or Climbing Stairs Difficulty yes     Walking or Climbing Stairs ambulation difficulty, requires equipment     Mobility Management cane and walker     Dressing/Bathing Difficulty no     Home Layout Able to live on 1st floor     Equipment Currently Used at Home cane, straight;walker, standard     Readmission within 30 days? No     Patient currently being followed by outpatient case management? No     Do you currently have service(s) that help you manage your care at home? Yes     Name and Contact number of agency [facility]     Is the pt/caregiver preference to resume services with current agency Yes     Do you take prescription medications? Yes     Do you have prescription coverage? Yes     Do you have any problems affording any of your prescribed medications? No     Is the patient taking medications as prescribed? yes     Who is going to help you get home at discharge? Sitters     How do you get to doctors appointments? family or friend will provide     Are you on dialysis? No     Do you take coumadin? No     Discharge Plan A Home;Home Health     Discharge Plan B Home with family     DME Needed Upon Discharge  none     Discharge Plan discussed with: Patient     Transition of Care Barriers None        Physical Activity    On average, how many days per week do you engage in moderate to strenuous exercise (like a  brisk walk)? 1 day     On average, how many minutes do you engage in exercise at this level? 10 min        Financial Resource Strain    How hard is it for you to pay for the very basics like food, housing, medical care, and heating? Not hard at all        Housing Stability    In the last 12 months, was there a time when you were not able to pay the mortgage or rent on time? No     In the last 12 months, was there a time when you did not have a steady place to sleep or slept in a shelter (including now)? No        Transportation Needs    In the past 12 months, has lack of transportation kept you from medical appointments or from getting medications? No     In the past 12 months, has lack of transportation kept you from meetings, work, or from getting things needed for daily living? No        Food Insecurity    Within the past 12 months, you worried that your food would run out before you got the money to buy more. Never true     Within the past 12 months, the food you bought just didn't last and you didn't have money to get more. Never true        Stress    Do you feel stress - tense, restless, nervous, or anxious, or unable to sleep at night because your mind is troubled all the time - these days? Not at all        Social Connections    In a typical week, how many times do you talk on the phone with family, friends, or neighbors? Never     How often do you get together with friends or relatives? Twice a week     How often do you attend Temple or Yazdanism services? 1 to 4 times per year     Do you belong to any clubs or organizations such as Temple groups, unions, fraternal or athletic groups, or school groups? No     How often do you attend meetings of the clubs or organizations you belong to? Never     Are you , , , , never , or living with a partner?         Alcohol Use    Q1: How often do you have a drink containing alcohol? Never     Q2: How many drinks containing  alcohol do you have on a typical day when you are drinking? Patient does not drink     Q3: How often do you have six or more drinks on one occasion? Never

## 2024-01-16 ENCOUNTER — TELEPHONE (OUTPATIENT)
Dept: INTERNAL MEDICINE | Facility: CLINIC | Age: 79
End: 2024-01-16
Payer: MEDICARE

## 2024-01-18 NOTE — TELEPHONE ENCOUNTER
Reviewed meds post recent hospital d/c. All appropriate but will plan to get updated A1c and TSH with next labs.

## 2024-01-22 ENCOUNTER — CARE AT HOME (OUTPATIENT)
Dept: HOME HEALTH SERVICES | Facility: CLINIC | Age: 79
End: 2024-01-22
Payer: MEDICARE

## 2024-01-22 VITALS — OXYGEN SATURATION: 98 % | HEART RATE: 68 BPM | TEMPERATURE: 98 F | RESPIRATION RATE: 16 BRPM

## 2024-01-22 DIAGNOSIS — I85.00 ESOPHAGEAL VARICES WITHOUT BLEEDING, UNSPECIFIED ESOPHAGEAL VARICES TYPE: ICD-10-CM

## 2024-01-22 DIAGNOSIS — C18.9 MALIGNANT NEOPLASM OF COLON, UNSPECIFIED PART OF COLON: ICD-10-CM

## 2024-01-22 DIAGNOSIS — G20.C PARKINSONISM, UNSPECIFIED PARKINSONISM TYPE: ICD-10-CM

## 2024-01-22 DIAGNOSIS — D46.9 MYELODYSPLASTIC SYNDROME: ICD-10-CM

## 2024-01-22 DIAGNOSIS — E11.51 TYPE 2 DIABETES MELLITUS WITH DIABETIC PERIPHERAL ANGIOPATHY WITHOUT GANGRENE, WITHOUT LONG-TERM CURRENT USE OF INSULIN: ICD-10-CM

## 2024-01-22 DIAGNOSIS — K56.609 SBO (SMALL BOWEL OBSTRUCTION): Primary | ICD-10-CM

## 2024-01-22 DIAGNOSIS — J43.9 PULMONARY EMPHYSEMA, UNSPECIFIED EMPHYSEMA TYPE: ICD-10-CM

## 2024-01-22 PROCEDURE — 99349 HOME/RES VST EST MOD MDM 40: CPT | Mod: S$GLB,,, | Performed by: NURSE PRACTITIONER

## 2024-01-22 PROCEDURE — 99497 ADVNCD CARE PLAN 30 MIN: CPT | Mod: S$GLB,,, | Performed by: NURSE PRACTITIONER

## 2024-01-22 NOTE — PATIENT INSTRUCTIONS
Instructions:  - [facility] to schedule home follow-up visit with patient in 4-6 weeks or as needed.  - Continue all medications, treatments and therapies as ordered.   - Follow all instructions, recommendations as discussed.  - Maintain Safety Precautions at all times.  - Attend all medical appointments as scheduled.  - For worsening symptoms: call Primary Care Physician or Nurse Practitioner.  - For emergencies, call 911 or immediately report to the nearest emergency room.  - Limit Risks of environmental exposure to coronavirus/COVID-19 as discussed including: social distancing, hand hygiene, and limiting departures from the home for necessities only.

## 2024-01-22 NOTE — ASSESSMENT & PLAN NOTE
Patient post hospital stay after presenting with SBO corrected without surgical intervention. Now with diarrhea at home, remaining well hydrated.Continue to follow GI for management.

## 2024-01-22 NOTE — ASSESSMENT & PLAN NOTE
R resting tremor, cogwheel rigidity LE > UE   Has always had low BP, now a little more symptomatic -- lightheaded/dizzy when she goes from sitting to standing  Reluctant to start cd/ld in the setting of hypotension and possible hallucinations  Second opinion from Dr. Montero or Dr. Khanna in setting of cognitive issues

## 2024-01-22 NOTE — PROGRESS NOTES
[facility]  Transitional Care Management (TCM) Home Visit    Encounter Provider: Elissa العراقي Chairs   PCP: Darci Guthrie MD  Consult Requested By: No ref. provider found  Admit Date: 1/12/24   IP Discharge Date: 1/14/24  Hospital Length of Stay:RRHLOS@ days  Hospital Length of Stay: 1 days  Discharge Date and Time:01/14/2024  Hospital Diagnosis: No admission diagnoses are documented for this encounter.     HISTORY OF PRESENT ILLNESS      Patient ID: Anayeli Judd is a 79 y.o. female was recently admitted to the hospital, this is their TCM encounter.    Hospital Course Synopsis:    HPI:   Anayeli Judd is an 79yoF with a past medical history significant for DMII, GERD, multiple intra-abdominal surgeries with recurrent partial small bowel obstructions who presents to the emergency room with complaints of nausea and abdominal pain. The patient is well-known to the general surgery service with three admissions during 2023 for partial small bowel obstructions, all of which were managed conservatively. The patient describes an indolent onset of gradually progressive abdominal pain that comes in waves. She describes associated nausea, which improves with zofran. She continues to endorse ongoing bowel function. Work up in the emergency room demonstrates a normal WBC, normal chemistry panel, UA consistent with a UTI and CT scan which continues to demonstrate dilated bowel at the site of her anastomosis.      On my exam, the patient is resting comfortably in bed. She states that her abdominal pain has improved since her presentation. She continues with bowel function. Plan of care discussed with the patient, who states that she would be more comfortable being observed in the hospital overnight.        Indwelling Lines/Drains at time of discharge:   Lines/Drains/Airways         None                      Hospital Course: Anayeli Judd is a 79 y.o. female admitted 1/12/2024 with a pSBO. This was managed conservatively  with bowel rest and GGC. She did not require NGT placement. The 4 hour KUB after gastrografin was given demonstrated contrast in the colon. She had return of bowel function. Her diet was advanced and was tolerated. She wad discharged to home in good condition on hospital day 2. Follow up in the general surgery clinic as needed    DECISION MAKING TODAY   AAOx4, sitting up in her living room in wheelchair, her sitter is present. She reports continued diarrhea. Reports a good appetite, staying hydrated by drinking water. Denies pain. Has [facility], next visit Thursday 1/25/24, ordered Hgb A1c and TSH as per Dr. Guthrie's note. No acute concerns today.    [facility] PT is working with patient over the last few weeks as well as  to help with patient needs. Education completed ~ 15 minutes. Plan to follow up.     VSS. Denies fever, chest pain, shortness of breath, nausea, vomiting, diarrhea. Risks of environmental exposure to coronavirus discussed including: social distancing, hand hygiene, and limiting departures from the home for necessities only.  Reports understanding and willingness to comply.  All hospital discharge orders reviewed and being followed, all medications reconciled and reviewed, patient and family verbalized understanding. No other needs identified at this time.     I initiated the process of advance care planning today and explained the importance of this process to the patient and family.  I introduced the concept of advance directives to the patient, as well. Then the patient received detailed information about the importance of designating a Health Care Power of  (HCPOA). She was also instructed to communicate with this person about his wishes for future healthcare, should he become sick and lose decision-making capacity. FULL CODE STATUS    I Introduced [place] form with patient/family, explaining this is the patient's wishes, and this form will be uploaded into the  "patient's [facility] Chart and the [place] Registry.     We spoke about ACP for 20 minutes.    Attestation: Screening criteria to assess the level of the patient's risk for infection with COVID-19 as recommended by the CDC at the time of the above documented home visit concluded appropriateness to proceed. Universal precautions were maintained at all times, including provider use of 60% alcohol gel hand  immediately prior to entry and upon departing the patient's home.    Assessment & Plan:  1. SBO (small bowel obstruction)  Assessment & Plan:  Patient post hospital stay after presenting with SBO corrected without surgical intervention. Now with diarrhea at home, remaining well hydrated.Continue to follow GI for management.        Orders:  -     Ambulatory referral/consult to Outpatient Case Management    2. Pulmonary emphysema, unspecified emphysema type  Assessment & Plan:  The current medical regimen is effective;  continue present plan and medications.   Stable on exam      3. Myelodysplastic syndrome    4. Type 2 diabetes mellitus with diabetic peripheral angiopathy without gangrene, without long-term current use of insulin  Assessment & Plan:  No results for input(s): "POCTGLUCOSE" in the last 72 hours.    Not on meds        5. Esophageal varices without bleeding, unspecified esophageal varices type    6. Parkinsonism, unspecified Parkinsonism type  Assessment & Plan:  R resting tremor, cogwheel rigidity LE > UE   Has always had low BP, now a little more symptomatic -- lightheaded/dizzy when she goes from sitting to standing  Reluctant to start cd/ld in the setting of hypotension and possible hallucinations  Second opinion from Dr. Montero or Dr. Khanna in setting of cognitive issues       7. Malignant neoplasm of colon, unspecified part of colon  Overview:  Diagnosed in 2011, s/p resection of bowel with reastemosis           Medication List on Discharge:     Medication List            Accurate as of " January 22, 2024 12:31 PM. If you have any questions, ask your nurse or doctor.                CONTINUE taking these medications      acetaminophen 650 MG Tbsr  Commonly known as: TYLENOL  Take 1,300 mg by mouth 2 (two) times a day.     ascorbic acid (vitamin C) 1000 MG tablet  Commonly known as: VITAMIN C  Take 1,000 mg by mouth once daily.     atorvastatin 40 MG tablet  Commonly known as: LIPITOR  Take 40 mg by mouth once daily.     biotin 10,000 mcg Tbdl  Take 1 tablet by mouth once daily.     blood sugar diagnostic Strp  Commonly known as: FREESTYLE LITE STRIPS  1 strip by Misc.(Non-Drug; Combo Route) route 3 (three) times daily.     blood-glucose meter kit  Use as instructed     calcium-vitamin D3 500 mg-5 mcg (200 unit) per tablet  Commonly known as: OS-KASSANDRA 500 + D3  Take 1 tablet by mouth once daily.     cranberry extract 200 mg Cap  Take 1 capsule by mouth once daily.     donepeziL 10 MG tablet  Commonly known as: ARICEPT  TAKE 1 TABLET(10 MG) BY MOUTH EVERY EVENING     DULoxetine 30 MG capsule  Commonly known as: CYMBALTA  TAKE 1 CAPSULE BY MOUTH TWICE  DAILY     ferrous sulfate 324 mg (65 mg iron) Tbec  Take 1 tablet (324 mg total) by mouth once daily.     FREESTYLE EFREN 14 DAY READER Misc  Generic drug: flash glucose scanning reader  Check blood glucose level 3 times daily.     FREESTYLE EFREN 14 DAY SENSOR Kit  Generic drug: flash glucose sensor  1 application  by Misc.(Non-Drug; Combo Route) route every 14 (fourteen) days. Disp 30 or 90 day refill     gabapentin 300 MG capsule  Commonly known as: NEURONTIN  Take 1 to 2 capsules 3 times daily if needed for leg pain.     HYDROcodone-acetaminophen 5-325 mg per tablet  Commonly known as: NORCO  Take 1 tablet by mouth every 6 (six) hours as needed for Pain.     hydrocortisone 2.5 % cream  Apply topically 2 (two) times daily as needed.     lancets 28 gauge Misc  Commonly known as: FREESTYLE LANCETS  30 lancets by Misc.(Non-Drug; Combo Route) route Daily.      * levothyroxine 75 MCG tablet  Commonly known as: SYNTHROID  TAKE 1 TABLET(75 MCG) BY MOUTH BEFORE BREAKFAST     * levothyroxine 75 MCG tablet  Commonly known as: SYNTHROID  Take 1 tablet (75 mcg total) by mouth before breakfast.     LIDOcaine 5 %  Commonly known as: LIDODERM  APPLY 1 TO 3 PATCHES TO BACK DAILY. REMOVE WITHIN 12 HOURS     linaGLIPtin 5 mg Tab tablet  Commonly known as: TRADJENTA  Take 1 tablet (5 mg total) by mouth once daily.     loperamide 2 mg capsule  Commonly known as: IMODIUM  Take 1 capsule (2 mg total) by mouth 2 (two) times daily as needed for Diarrhea.     magnesium 30 mg Tab  Take 30 mg by mouth once. Patient does not know actual dose in MG     metFORMIN 500 MG tablet  Commonly known as: GLUCOPHAGE  Take 1 tablet (500 mg total) by mouth 2 (two) times daily with meals.     mometasone 0.1 % ointment  Commonly known as: ELOCON  Apply topically 2 (two) times a day. Mix 50/50 with mupirocin ointment. Apply in each nostril twice daily.     montelukast 10 mg tablet  Commonly known as: SINGULAIR  TAKE 1 TABLET BY MOUTH  DAILY     ondansetron 8 MG Tbdl  Commonly known as: ZOFRAN-ODT  Take 1 tablet (8 mg total) by mouth every 8 (eight) hours as needed (nausea).     pantoprazole 20 MG tablet  Commonly known as: PROTONIX  TAKE 1 TABLET(20 MG) BY MOUTH TWICE DAILY     polyethylene glycol 17 gram Pwpk  Commonly known as: GLYCOLAX  Take 17 g by mouth once daily.     PREMARIN vaginal cream  Generic drug: conjugated estrogens  INSERT 0.5 GRAM VAGINALLY 2 TIMES A WEEK     traZODone 50 MG tablet  Commonly known as: DESYREL  Take 1 tablet (50 mg total) by mouth every evening.     UBRELVY 100 mg tablet  Generic drug: ubrogepant  Take 1 tablet daily as needed for migraine headache. May take 1 additional tablet 2 hours later if needed.     valACYclovir 1000 MG tablet  Commonly known as: VALTREX  TAKE 1 TABLET(1000 MG) BY MOUTH EVERY DAY           * This list has 2 medication(s) that are the same as other  medications prescribed for you. Read the directions carefully, and ask your doctor or other care provider to review them with you.                  Medication Reconciliation:  Were medications changed on discharge? No  Were medications in the home? Yes  Is the patient taking the medications as directed? Yes  Does the patient understand the medications and changes? Yes  Does updated med list accurately reflects meds patient is currently taking? Yes    ENVIRONMENT OF CARE      Family and/or Caregiver present at visit?  Yes  Name of Caregiver: sitter  History provided by: patient and caregiver    Advance Care Planning   Advanced Care Planning Status:  Patient has had an ACP conversation  Living Will: Yes  Power of : Yes  [place]: No    Does Caregiver have HCPoA: Yes  Changes today: 0  Is patient hospice appropriate: No  (If needed, use PPS <30 or FAST score >7)  Was referral to hospice placed: No       Impression upon entering the home:  Physical Dwelling: apartment/condo   Appearance of home environment: cleaniness: clean  Functional Status: moderate assistance  Mobility: chair bound  Nutritional access: adequate intake and access  Home Health: Yes,  Agency [facility]    DME/Supplies: rolling walker and wheelchair     Diagnostic tests reviewed/disposition: No diagnosic tests pending after this hospitalization.  Disease/illness education: Diabetes  Establishment or re-establishment of referral orders for community resources: No other necessary community resources.   Discussion with other health care providers: No discussion with other health care providers necessary.   Does patient have a PCP at [facility]? Yes   Repatriation plan with PCP? follow-up with PCP within 30d   Does patient have an ostomy (ileostomy, colostomy, suprapubic catheter, nephrostomy tube, tracheostomy, PEG tube, pleurex catheter, cholecystostomy, etc)? No  Were BPAs reviewed? Yes    Social History     Socioeconomic History    Marital status:     Tobacco Use    Smoking status: Never    Smokeless tobacco: Never   Substance and Sexual Activity    Alcohol use: Not Currently     Comment: socially, rarely    Drug use: Never    Sexual activity: Not Currently   Social History Narrative    , lives alone.  Primary support are her children and friends.     Social Determinants of Health     Financial Resource Strain: Low Risk  (1/14/2024)    Overall Financial Resource Strain (CARDIA)     Difficulty of Paying Living Expenses: Not hard at all   Food Insecurity: No Food Insecurity (1/14/2024)    Hunger Vital Sign     Worried About Running Out of Food in the Last Year: Never true     Ran Out of Food in the Last Year: Never true   Transportation Needs: No Transportation Needs (1/14/2024)    PRAPARE - Transportation     Lack of Transportation (Medical): No     Lack of Transportation (Non-Medical): No   Physical Activity: Insufficiently Active (1/14/2024)    Exercise Vital Sign     Days of Exercise per Week: 1 day     Minutes of Exercise per Session: 10 min   Stress: No Stress Concern Present (1/14/2024)    [place] [employer] - Occupational Stress Questionnaire     Feeling of Stress : Not at all   Social Connections: Socially Isolated (1/14/2024)    Social Connection and Isolation Panel [NHANES]     Frequency of Communication with Friends and Family: Never     Frequency of Social Gatherings with Friends and Family: Twice a week     Attends Orthodox Services: 1 to 4 times per year     Active Member of Clubs or Organizations: No     Attends Club or Organization Meetings: Never     Marital Status:    Housing Stability: Low Risk  (1/14/2024)    Housing Stability Vital Sign     Unable to Pay for Housing in the Last Year: No     Number of Places Lived in the Last Year: 1     Unstable Housing in the Last Year: No       OBJECTIVE:     Vital Signs:  Vitals:    01/22/24 1224   Pulse: 68   Resp: 16   Temp: 97.6 °F (36.4 °C)       Review of Systems    Constitutional:  Negative for fatigue and unexpected weight change.   HENT:  Negative for congestion.    Respiratory:  Negative for cough and shortness of breath.    Cardiovascular:  Negative for chest pain and palpitations.   Gastrointestinal:  Positive for diarrhea. Negative for abdominal distention, nausea and vomiting.   Endocrine: Negative for polydipsia and polyuria.   Genitourinary:  Negative for difficulty urinating.   Neurological:  Positive for weakness. Negative for dizziness.   Psychiatric/Behavioral:  Negative for agitation.        Physical Exam:  Physical Exam  HENT:      Head: Atraumatic.      Mouth/Throat:      Mouth: Mucous membranes are moist.   Cardiovascular:      Rate and Rhythm: Normal rate.      Pulses: Normal pulses.   Pulmonary:      Effort: Pulmonary effort is normal.   Abdominal:      General: Abdomen is flat.   Skin:     General: Skin is warm and dry.      Capillary Refill: Capillary refill takes less than 2 seconds.   Neurological:      Mental Status: She is alert and oriented to person, place, and time.   Psychiatric:         Mood and Affect: Mood normal.         INSTRUCTIONS FOR PATIENT:     Scheduled Follow-up, Appts Reviewed with Modifications if Needed: Yes  Future Appointments   Date Time Provider Department Center   2/7/2024  3:30 PM Jaqueline Espinoza MD [facility]       Signature: Elissa العراقي Chairs, NP    Transition of Care Visit:  I have reviewed and updated the history and problem list.  I have reconciled the medication list.  I have discussed the hospitalization and current medical issues, prognosis and plans with the patient/family.

## 2024-01-22 NOTE — ASSESSMENT & PLAN NOTE
The current medical regimen is effective;  continue present plan and medications.   Stable on exam

## 2024-01-23 DIAGNOSIS — E03.9 HYPOTHYROIDISM, UNSPECIFIED TYPE: ICD-10-CM

## 2024-01-24 ENCOUNTER — TELEPHONE (OUTPATIENT)
Dept: INTERNAL MEDICINE | Facility: CLINIC | Age: 79
End: 2024-01-24
Payer: MEDICARE

## 2024-01-24 DIAGNOSIS — R19.7 DIARRHEA, UNSPECIFIED TYPE: Primary | ICD-10-CM

## 2024-01-24 RX ORDER — LEVOTHYROXINE SODIUM 75 UG/1
75 TABLET ORAL
Qty: 90 TABLET | Refills: 3 | Status: SHIPPED | OUTPATIENT
Start: 2024-01-24

## 2024-01-24 RX ORDER — DIPHENOXYLATE HYDROCHLORIDE AND ATROPINE SULFATE 2.5; .025 MG/1; MG/1
1 TABLET ORAL 3 TIMES DAILY PRN
Qty: 30 TABLET | Refills: 0 | Status: SHIPPED | OUTPATIENT
Start: 2024-01-24

## 2024-01-24 NOTE — TELEPHONE ENCOUNTER
Spoke with daughter, Danielle. Continue ongoing diarrhea. No recent abx. Reviewed meds. Has been on Metformin for a while. Will keep this and will wean from Donepezil to start. Take 3 days weekly for 2 weeks then stop. Agreed on using Lomotil for the time being. Plan to get updated TSH soon.

## 2024-01-24 NOTE — TELEPHONE ENCOUNTER
Care Due:                  Date            Visit Type   Department     Provider  --------------------------------------------------------------------------------                                             [facility]   INTERNAL  Last Visit: 06-      PATIENT      MEDICINE       Darci Guthrie  Next Visit: None Scheduled  None         None Found                                                            Last  Test          Frequency    Reason                     Performed    Due Date  --------------------------------------------------------------------------------    HBA1C.......  6 months...  linaGLIPtin, metFORMIN...  09- 03-    [facility] Embedded Care Due Messages. Reference number: [id number].   1/23/2024 10:26:56 PM CST

## 2024-01-24 NOTE — TELEPHONE ENCOUNTER
Refill Routing Note   Medication(s) are not appropriate for processing by [facility] for the following reason(s):        Required labs abnormal  ED/Hospital Visit since last OV with provider    [facility] action(s):  Defer     Requires labs : Yes             Appointments  past 12m or future 3m with PCP    Date Provider   Last Visit   6/6/2023 Darci Guthrie MD   Next Visit   Visit date not found Darci Guthrie MD   ED visits in past 90 days: 0        Note composed:8:06 AM 01/24/2024

## 2024-01-25 ENCOUNTER — LAB VISIT (OUTPATIENT)
Dept: LAB | Facility: HOSPITAL | Age: 79
End: 2024-01-25
Attending: NURSE PRACTITIONER
Payer: MEDICARE

## 2024-01-25 ENCOUNTER — PATIENT MESSAGE (OUTPATIENT)
Dept: INTERNAL MEDICINE | Facility: CLINIC | Age: 79
End: 2024-01-25
Payer: MEDICARE

## 2024-01-25 DIAGNOSIS — I51.9 MYXEDEMA HEART DISEASE: ICD-10-CM

## 2024-01-25 DIAGNOSIS — E03.9 MYXEDEMA HEART DISEASE: ICD-10-CM

## 2024-01-25 DIAGNOSIS — E11.51 TYPE II DIABETES MELLITUS WITH PERIPHERAL CIRCULATORY DISORDER: Primary | ICD-10-CM

## 2024-01-25 LAB
ESTIMATED AVG GLUCOSE: 154 MG/DL (ref 68–131)
HBA1C MFR BLD: 7 % (ref 4–5.6)
T4 FREE SERPL-MCNC: 0.94 NG/DL (ref 0.71–1.51)
TSH SERPL DL<=0.005 MIU/L-ACNC: 4.62 UIU/ML (ref 0.4–4)

## 2024-01-25 PROCEDURE — 84439 ASSAY OF FREE THYROXINE: CPT | Performed by: NURSE PRACTITIONER

## 2024-01-25 PROCEDURE — 83036 HEMOGLOBIN GLYCOSYLATED A1C: CPT | Performed by: NURSE PRACTITIONER

## 2024-01-25 PROCEDURE — 84443 ASSAY THYROID STIM HORMONE: CPT | Performed by: NURSE PRACTITIONER

## 2024-01-28 ENCOUNTER — NURSE TRIAGE (OUTPATIENT)
Dept: ADMINISTRATIVE | Facility: CLINIC | Age: 79
End: 2024-01-28
Payer: MEDICARE

## 2024-01-28 DIAGNOSIS — R30.0 DYSURIA: Primary | ICD-10-CM

## 2024-01-28 RX ORDER — CEPHALEXIN 500 MG/1
500 CAPSULE ORAL 2 TIMES DAILY
Qty: 6 CAPSULE | Refills: 0 | Status: SHIPPED | OUTPATIENT
Start: 2024-01-28 | End: 2024-01-31

## 2024-01-28 NOTE — TELEPHONE ENCOUNTER
"Pt with urinary symptoms since yesterday afternoon.  Decreased urine output, states urination is "orange".  Feels like "someone is sticking a stick in me down there".  Pt states she is very uncomfortable.  Care advice states to go to the ED now.  Patient and her daughter verbally understands, all questions answered, advised to call back for any worsening symptoms or further needs.     Reason for Disposition   [1] Unable to urinate (or only a few drops) > 4 hours AND [2] bladder feels very full (e.g., palpable bladder or strong urge to urinate)    Additional Information   Negative: Shock suspected (e.g., cold/pale/clammy skin, too weak to stand, low BP, rapid pulse)   Negative: Sounds like a life-threatening emergency to the triager    Protocols used: Urination Pain - Female-A-AH    "

## 2024-01-29 ENCOUNTER — TELEPHONE (OUTPATIENT)
Dept: INTERNAL MEDICINE | Facility: CLINIC | Age: 79
End: 2024-01-29
Payer: MEDICARE

## 2024-01-29 ENCOUNTER — PATIENT MESSAGE (OUTPATIENT)
Dept: INTERNAL MEDICINE | Facility: CLINIC | Age: 79
End: 2024-01-29
Payer: MEDICARE

## 2024-01-29 ENCOUNTER — LAB VISIT (OUTPATIENT)
Dept: LAB | Facility: HOSPITAL | Age: 79
End: 2024-01-29
Attending: EMERGENCY MEDICINE
Payer: MEDICARE

## 2024-01-29 DIAGNOSIS — N30.00 ACUTE CYSTITIS WITHOUT HEMATURIA: Primary | ICD-10-CM

## 2024-01-29 DIAGNOSIS — R30.0 DYSURIA: ICD-10-CM

## 2024-01-29 LAB
BACTERIA #/AREA URNS AUTO: ABNORMAL /HPF
BILIRUB UR QL STRIP: NEGATIVE
CLARITY UR REFRACT.AUTO: ABNORMAL
COLOR UR AUTO: YELLOW
GLUCOSE UR QL STRIP: NEGATIVE
HGB UR QL STRIP: ABNORMAL
KETONES UR QL STRIP: NEGATIVE
LEUKOCYTE ESTERASE UR QL STRIP: ABNORMAL
MICROSCOPIC COMMENT: ABNORMAL
NITRITE UR QL STRIP: NEGATIVE
PH UR STRIP: 5 [PH] (ref 5–8)
PROT UR QL STRIP: NEGATIVE
RBC #/AREA URNS AUTO: 5 /HPF (ref 0–4)
SP GR UR STRIP: 1.01 (ref 1–1.03)
SQUAMOUS #/AREA URNS AUTO: 1 /HPF
URN SPEC COLLECT METH UR: ABNORMAL
WBC #/AREA URNS AUTO: 49 /HPF (ref 0–5)
WBC CLUMPS UR QL AUTO: ABNORMAL

## 2024-01-29 PROCEDURE — 87086 URINE CULTURE/COLONY COUNT: CPT | Performed by: EMERGENCY MEDICINE

## 2024-01-29 PROCEDURE — 81001 URINALYSIS AUTO W/SCOPE: CPT | Performed by: EMERGENCY MEDICINE

## 2024-01-29 PROCEDURE — G0179 MD RECERTIFICATION HHA PT: HCPCS | Mod: ,,, | Performed by: STUDENT IN AN ORGANIZED HEALTH CARE EDUCATION/TRAINING PROGRAM

## 2024-01-29 NOTE — TELEPHONE ENCOUNTER
----- Message from Sarahy Clark LPN sent at 1/29/2024 10:07 AM CST -----  Im sorry if this is duplicate   ----- Message -----  From: India Melgar  Sent: 1/29/2024   9:42 AM CST  To: [facility]jar[facility] Staff    Hi,     Mrs. Guadalupe called to confirm if Dr. Guthrie would like to see her today. If not she wants to know is she should drop the urine collect off at the [facility] lab. Please advise.     Mrs. Guadalupe isn't available until after 11 am. She has someone coming over to her home at 10 am.     Thanks for all you do,   India   [facility]

## 2024-01-30 ENCOUNTER — OUTPATIENT CASE MANAGEMENT (OUTPATIENT)
Dept: ADMINISTRATIVE | Facility: OTHER | Age: 79
End: 2024-01-30
Payer: MEDICARE

## 2024-01-30 DIAGNOSIS — G47.00 INSOMNIA, UNSPECIFIED TYPE: ICD-10-CM

## 2024-01-30 LAB
BACTERIA UR CULT: NORMAL
BACTERIA UR CULT: NORMAL

## 2024-01-30 RX ORDER — TRAZODONE HYDROCHLORIDE 50 MG/1
50 TABLET ORAL NIGHTLY
Qty: 90 TABLET | Refills: 1 | Status: SHIPPED | OUTPATIENT
Start: 2024-01-30

## 2024-01-30 NOTE — LETTER
January 30, 2024             Dear Anayeli,    Welcome to [facility] Complex Care Management [facility].  It was a pleasure talking with you today.  My name is Elizabeth Lea, and I look forward to being your Care Manager.  My goal is to help you function at the healthiest and highest level possible.  You can contact me directly at [phone].    As an [facility] patient, some of the services we may be able to provide include:     Development of an individualized care plan with a Registered Nurse   Connection with a   Connection with available resources and services    Coordinate communication among your care team members   Provide coaching and education   Help you understand your doctors treatment plan  Help you obtain information about your insurance coverage.     All services provided by [facility] Complex Care Managers and other care team members are coordinated with and communicated to your primary care team.      As part of your enrollment, you will be receiving education materials and more information about these services in your [facility] account, by phone or through the mail.  If you do not wish to participate or receive information, please contact our office at [phone].      Sincerely,        Elizabeth Lea, RN  [facility]   Out-patient RN Complex Care Manager

## 2024-01-30 NOTE — TELEPHONE ENCOUNTER
No care due was identified.  [facility] Embedded Care Due Messages. Reference number: [id number].   1/30/2024 12:53:02 PM CST

## 2024-01-30 NOTE — TELEPHONE ENCOUNTER
Dysuria with positive UA. My partner started short course Keflex. Will f/u culture results when available.

## 2024-01-30 NOTE — ASSESSMENT & PLAN NOTE
· Resolved     Xolair Counseling:  Patient informed of potential adverse effects including but not limited to fever, muscle aches, rash and allergic reactions.  The patient verbalized understanding of the proper use and possible adverse effects of Xolair.  All of the patient's questions and concerns were addressed. Topical Clindamycin Counseling: Patient counseled that this medication may cause skin irritation or allergic reactions.  In the event of skin irritation, the patient was advised to reduce the amount of the drug applied or use it less frequently.   The patient verbalized understanding of the proper use and possible adverse effects of clindamycin.  All of the patient's questions and concerns were addressed. Azelaic Acid Pregnancy And Lactation Text: This medication is considered safe during pregnancy and breast feeding. Prednisone Counseling:  I discussed with the patient the risks of prolonged use of prednisone including but not limited to weight gain, insomnia, osteoporosis, mood changes, diabetes, susceptibility to infection, glaucoma and high blood pressure.  In cases where prednisone use is prolonged, patients should be monitored with blood pressure checks, serum glucose levels and an eye exam.  Additionally, the patient may need to be placed on GI prophylaxis, PCP prophylaxis, and calcium and vitamin D supplementation and/or a bisphosphonate.  The patient verbalized understanding of the proper use and the possible adverse effects of prednisone.  All of the patient's questions and concerns were addressed. Drysol Counseling:  I discussed with the patient the risks of drysol/aluminum chloride including but not limited to skin rash, itching, irritation, burning. Colchicine Counseling:  Patient counseled regarding adverse effects including but not limited to stomach upset (nausea, vomiting, stomach pain, or diarrhea).  Patient instructed to limit alcohol consumption while taking this medication.  Colchicine may reduce blood counts especially with prolonged use.  The patient understands that monitoring of kidney function and blood counts may be required, especially at baseline. The patient verbalized understanding of the proper use and possible adverse effects of colchicine.  All of the patient's questions and concerns were addressed. Topical Sulfur Applications Pregnancy And Lactation Text: This medication is considered safe during pregnancy and breast feeding secondary to limited systemic absorption. Imiquimod Pregnancy And Lactation Text: This medication is Pregnancy Category C. It is unknown if this medication is excreted in breast milk. Humira Pregnancy And Lactation Text: This medication is Pregnancy Category B and is considered safe during pregnancy. It is unknown if this medication is excreted in breast milk. Hydroxychloroquine Pregnancy And Lactation Text: This medication has been shown to cause fetal harm but it isn't assigned a Pregnancy Risk Category. There are small amounts excreted in breast milk. Dutasteride Male Counseling: Dustasteride Counseling:  I discussed with the patient the risks of use of dutasteride including but not limited to decreased libido, decreased ejaculate volume, and gynecomastia. Women who can become pregnant should not handle medication.  All of the patient's questions and concerns were addressed. Bexarotene Pregnancy And Lactation Text: This medication is Pregnancy Category X and should not be given to women who are pregnant or may become pregnant. This medication should not be used if you are breast feeding. Azathioprine Pregnancy And Lactation Text: This medication is Pregnancy Category D and isn't considered safe during pregnancy. It is unknown if this medication is excreted in breast milk. Rhofade Pregnancy And Lactation Text: This medication has not been assigned a Pregnancy Risk Category. It is unknown if the medication is excreted in breast milk. Zyclara Counseling:  I discussed with the patient the risks of imiquimod including but not limited to erythema, scaling, itching, weeping, crusting, and pain.  Patient understands that the inflammatory response to imiquimod is variable from person to person and was educated regarded proper titration schedule.  If flu-like symptoms develop, patient knows to discontinue the medication and contact us. Otezla Pregnancy And Lactation Text: This medication is Pregnancy Category C and it isn't known if it is safe during pregnancy. It is unknown if it is excreted in breast milk. Quinolones Counseling:  I discussed with the patient the risks of fluoroquinolones including but not limited to GI upset, allergic reaction, drug rash, diarrhea, dizziness, photosensitivity, yeast infections, liver function test abnormalities, tendonitis/tendon rupture. Rinvoq Pregnancy And Lactation Text: Based on animal studies, Rinvoq may cause embryo-fetal harm when administered to pregnant women.  The medication should not be used in pregnancy.  Breastfeeding is not recommended during treatment and for 6 days after the last dose. Simponi Pregnancy And Lactation Text: The risk during pregnancy and breastfeeding is uncertain with this medication. Opzelura Counseling:  I discussed with the patient the risks of Opzelura including but not limited to nasopharngitis, bronchitis, ear infection, eosinophila, hives, diarrhea, folliculitis, tonsillitis, and rhinorrhea.  Taken orally, this medication has been linked to serious infections; higher rate of mortality; malignancy and lymphoproliferative disorders; major adverse cardiovascular events; thrombosis; thrombocytopenia, anemia, and neutropenia; and lipid elevations. Albendazole Counseling:  I discussed with the patient the risks of albendazole including but not limited to cytopenia, kidney damage, nausea/vomiting and severe allergy.  The patient understands that this medication is being used in an off-label manner. Tranexamic Acid Counseling:  Patient advised of the small risk of bleeding problems with tranexamic acid. They were also instructed to call if they developed any nausea, vomiting or diarrhea. All of the patient's questions and concerns were addressed. Fluconazole Pregnancy And Lactation Text: This medication is Pregnancy Category C and it isn't know if it is safe during pregnancy. It is also excreted in breast milk. Klisyri Counseling:  I discussed with the patient the risks of Klisyri including but not limited to erythema, scaling, itching, weeping, crusting, and pain. Colchicine Pregnancy And Lactation Text: This medication is Pregnancy Category C and isn't considered safe during pregnancy. It is excreted in breast milk. Dutasteride Female Counseling: Dutasteride Counseling:  I discussed with the patient the risks of use of dutasteride including but not limited to decreased libido and sexual dysfunction. Explained the teratogenic nature of the medication and stressed the importance of not getting pregnant during treatment. All of the patient's questions and concerns were addressed. Isotretinoin Counseling: Patient should get monthly blood tests, not donate blood, not drive at night if vision affected, not share medication, and not undergo elective surgery for 6 months after tx completed. Side effects reviewed, pt to contact office should one occur. Wartpeel Counseling:  I discussed with the patient the risks of Wartpeel including but not limited to erythema, scaling, itching, weeping, crusting, and pain. Terbinafine Counseling: Patient counseling regarding adverse effects of terbinafine including but not limited to headache, diarrhea, rash, upset stomach, liver function test abnormalities, itching, taste/smell disturbance, nausea, abdominal pain, and flatulence.  There is a rare possibility of liver failure that can occur when taking terbinafine.  The patient understands that a baseline LFT and kidney function test may be required. The patient verbalized understanding of the proper use and possible adverse effects of terbinafine.  All of the patient's questions and concerns were addressed. Ilumya Counseling: I discussed with the patient the risks of tildrakizumab including but not limited to immunosuppression, malignancy, posterior leukoencephalopathy syndrome, and serious infections.  The patient understands that monitoring is required including a PPD at baseline and must alert us or the primary physician if symptoms of infection or other concerning signs are noted. Low Dose Naltrexone Counseling- I discussed with the patient the potential risks and side effects of low dose naltrexone including but not limited to: more vivid dreams, headaches, nausea, vomiting, abdominal pain, fatigue, dizziness, and anxiety. Clindamycin Pregnancy And Lactation Text: This medication can be used in pregnancy if certain situations. Clindamycin is also present in breast milk. Cimzia Pregnancy And Lactation Text: This medication crosses the placenta but can be considered safe in certain situations. Cimzia may be excreted in breast milk. Xolair Pregnancy And Lactation Text: This medication is Pregnancy Category B and is considered safe during pregnancy. This medication is excreted in breast milk. Oxybutynin Counseling:  I discussed with the patient the risks of oxybutynin including but not limited to skin rash, drowsiness, dry mouth, difficulty urinating, and blurred vision. Benzoyl Peroxide Counseling: Patient counseled that medicine may cause skin irritation and bleach clothing.  In the event of skin irritation, the patient was advised to reduce the amount of the drug applied or use it less frequently.   The patient verbalized understanding of the proper use and possible adverse effects of benzoyl peroxide.  All of the patient's questions and concerns were addressed. Cellcept Counseling:  I discussed with the patient the risks of mycophenolate mofetil including but not limited to infection/immunosuppression, GI upset, hypokalemia, hypercholesterolemia, bone marrow suppression, lymphoproliferative disorders, malignancy, GI ulceration/bleed/perforation, colitis, interstitial lung disease, kidney failure, progressive multifocal leukoencephalopathy, and birth defects.  The patient understands that monitoring is required including a baseline creatinine and regular CBC testing. In addition, patient must alert us immediately if symptoms of infection or other concerning signs are noted. Solaraze Counseling:  I discussed with the patient the risks of Solaraze including but not limited to erythema, scaling, itching, weeping, crusting, and pain. Topical Clindamycin Pregnancy And Lactation Text: This medication is Pregnancy Category B and is considered safe during pregnancy. It is unknown if it is excreted in breast milk. Albendazole Pregnancy And Lactation Text: This medication is Pregnancy Category C and it isn't known if it is safe during pregnancy. It is also excreted in breast milk. Tranexamic Acid Pregnancy And Lactation Text: It is unknown if this medication is safe during pregnancy or breast feeding. Skyrizi Counseling: I discussed with the patient the risks of risankizumab-rzaa including but not limited to immunosuppression, and serious infections.  The patient understands that monitoring is required including a PPD at baseline and must alert us or the primary physician if symptoms of infection or other concerning signs are noted. Prednisone Pregnancy And Lactation Text: This medication is Pregnancy Category C and it isn't know if it is safe during pregnancy. This medication is excreted in breast milk. Opzelura Pregnancy And Lactation Text: There is insufficient data to evaluate drug-associated risk for major birth defects, miscarriage, or other adverse maternal or fetal outcomes.  There is a pregnancy registry that monitors pregnancy outcomes in pregnant persons exposed to the medication during pregnancy.  It is unknown if this medication is excreted in breast milk.  Do not breastfeed during treatment and for about 4 weeks after the last dose. Cosentyx Counseling:  I discussed with the patient the risks of Cosentyx including but not limited to worsening of Crohn's disease, immunosuppression, allergic reactions and infections.  The patient understands that monitoring is required including a PPD at baseline and must alert us or the primary physician if symptoms of infection or other concerning signs are noted. Dutasteride Pregnancy And Lactation Text: This medication is absolutely contraindicated in women, especially during pregnancy and breast feeding. Feminization of male fetuses is possible if taking while pregnant. Wartpeel Pregnancy And Lactation Text: This medication is Pregnancy Category X and contraindicated in pregnancy and in women who may become pregnant. It is unknown if this medication is excreted in breast milk. Griseofulvin Counseling:  I discussed with the patient the risks of griseofulvin including but not limited to photosensitivity, cytopenia, liver damage, nausea/vomiting and severe allergy.  The patient understands that this medication is best absorbed when taken with a fatty meal (e.g., ice cream or french fries). Klisyri Pregnancy And Lactation Text: It is unknown if this medication can harm a developing fetus or if it is excreted in breast milk. Low Dose Naltrexone Pregnancy And Lactation Text: Naltrexone is pregnancy category C.  There have been no adequate and well-controlled studies in pregnant women.  It should be used in pregnancy only if the potential benefit justifies the potential risk to the fetus.   Limited data indicates that naltrexone is minimally excreted into breastmilk. Sotyktu Counseling:  I discussed the most common side effects of Sotyktu including: common cold, sore throat, sinus infections, cold sores, canker sores, folliculitis, and acne.  I also discussed more serious side effects of Sotyktu including but not limited to: serious allergic reactions; increased risk for infections such as TB; cancers such as lymphomas; rhabdomyolysis and elevated CPK; and elevated triglycerides and liver enzymes.  Doxycycline Counseling:  Patient counseled regarding possible photosensitivity and increased risk for sunburn.  Patient instructed to avoid sunlight, if possible.  When exposed to sunlight, patients should wear protective clothing, sunglasses, and sunscreen.  The patient was instructed to call the office immediately if the following severe adverse effects occur:  hearing changes, easy bruising/bleeding, severe headache, or vision changes.  The patient verbalized understanding of the proper use and possible adverse effects of doxycycline.  All of the patient's questions and concerns were addressed. Topical Ketoconazole Counseling: Patient counseled that this medication may cause skin irritation or allergic reactions.  In the event of skin irritation, the patient was advised to reduce the amount of the drug applied or use it less frequently.   The patient verbalized understanding of the proper use and possible adverse effects of ketoconazole.  All of the patient's questions and concerns were addressed. Dapsone Counseling: I discussed with the patient the risks of dapsone including but not limited to hemolytic anemia, agranulocytosis, rashes, methemoglobinemia, kidney failure, peripheral neuropathy, headaches, GI upset, and liver toxicity.  Patients who start dapsone require monitoring including baseline LFTs and weekly CBCs for the first month, then every month thereafter.  The patient verbalized understanding of the proper use and possible adverse effects of dapsone.  All of the patient's questions and concerns were addressed. Terbinafine Pregnancy And Lactation Text: This medication is Pregnancy Category B and is considered safe during pregnancy. It is also excreted in breast milk and breast feeding isn't recommended. Elidel Counseling: Patient may experience a mild burning sensation during topical application. Elidel is not approved in children less than 2 years of age. There have been case reports of hematologic and skin malignancies in patients using topical calcineurin inhibitors although causality is questionable. Isotretinoin Pregnancy And Lactation Text: This medication is Pregnancy Category X and is considered extremely dangerous during pregnancy. It is unknown if it is excreted in breast milk. Benzoyl Peroxide Pregnancy And Lactation Text: This medication is Pregnancy Category C. It is unknown if benzoyl peroxide is excreted in breast milk. Solaraze Pregnancy And Lactation Text: This medication is Pregnancy Category B and is considered safe. There is some data to suggest avoiding during the third trimester. It is unknown if this medication is excreted in breast milk. Azithromycin Counseling:  I discussed with the patient the risks of azithromycin including but not limited to GI upset, allergic reaction, drug rash, diarrhea, and yeast infections. Niacinamide Counseling: I recommended taking niacin or niacinamide, also know as vitamin B3, twice daily. Recent evidence suggests that taking vitamin B3 (500 mg twice daily) can reduce the risk of actinic keratoses and non-melanoma skin cancers. Side effects of vitamin B3 include flushing and headache. Erivedge Counseling- I discussed with the patient the risks of Erivedge including but not limited to nausea, vomiting, diarrhea, constipation, weight loss, changes in the sense of taste, decreased appetite, muscle spasms, and hair loss.  The patient verbalized understanding of the proper use and possible adverse effects of Erivedge.  All of the patient's questions and concerns were addressed. Picato Counseling:  I discussed with the patient the risks of Picato including but not limited to erythema, scaling, itching, weeping, crusting, and pain. Opioid Counseling: I discussed with the patient the potential side effects of opioids including but not limited to addiction, altered mental status, and depression. I stressed avoiding alcohol, benzodiazepines, muscle relaxants and sleep aids unless specifically okayed by a physician. The patient verbalized understanding of the proper use and possible adverse effects of opioids. All of the patient's questions and concerns were addressed. They were instructed to flush the remaining pills down the toilet if they did not need them for pain. Doxycycline Pregnancy And Lactation Text: This medication is Pregnancy Category D and not consider safe during pregnancy. It is also excreted in breast milk but is considered safe for shorter treatment courses. Cibinqo Counseling: I discussed with the patient the risks of Cibinqo therapy including but not limited to common cold, nausea, headache, cold sores, increased blood CPK levels, dizziness, UTIs, fatigue, acne, and vomitting. Live vaccines should be avoided.  This medication has been linked to serious infections; higher rate of mortality; malignancy and lymphoproliferative disorders; major adverse cardiovascular events; thrombosis; thrombocytopenia and lymphopenia; lipid elevations; and retinal detachment. Rifampin Counseling: I discussed with the patient the risks of rifampin including but not limited to liver damage, kidney damage, red-orange body fluids, nausea/vomiting and severe allergy. Valtrex Counseling: I discussed with the patient the risks of valacyclovir including but not limited to kidney damage, nausea, vomiting and severe allergy.  The patient understands that if the infection seems to be worsening or is not improving, they are to call. Ivermectin Counseling:  Patient instructed to take medication on an empty stomach with a full glass of water.  Patient informed of potential adverse effects including but not limited to nausea, diarrhea, dizziness, itching, and swelling of the extremities or lymph nodes.  The patient verbalized understanding of the proper use and possible adverse effects of ivermectin.  All of the patient's questions and concerns were addressed. Sotyktu Pregnancy And Lactation Text: There is insufficient data to evaluate whether or not Sotyktu is safe to use during pregnancy.   It is not known if Sotyktu passes into breast milk and whether or not it is safe to use when breastfeeding.   Dapsone Pregnancy And Lactation Text: This medication is Pregnancy Category C and is not considered safe during pregnancy or breast feeding. Griseofulvin Pregnancy And Lactation Text: This medication is Pregnancy Category X and is known to cause serious birth defects. It is unknown if this medication is excreted in breast milk but breast feeding should be avoided. Minoxidil Counseling: Minoxidil is a topical medication which can increase blood flow where it is applied. It is uncertain how this medication increases hair growth. Side effects are uncommon and include stinging and allergic reactions. Infliximab Counseling:  I discussed with the patient the risks of infliximab including but not limited to myelosuppression, immunosuppression, autoimmune hepatitis, demyelinating diseases, lymphoma, and serious infections.  The patient understands that monitoring is required including a PPD at baseline and must alert us or the primary physician if symptoms of infection or other concerning signs are noted. Finasteride Male Counseling: Finasteride Counseling:  I discussed with the patient the risks of use of finasteride including but not limited to decreased libido, decreased ejaculate volume, gynecomastia, and depression. Women should not handle medication.  All of the patient's questions and concerns were addressed. Winlevi Counseling:  I discussed with the patient the risks of topical clascoterone including but not limited to erythema, scaling, itching, and stinging. Patient voiced their understanding. Carac Counseling:  I discussed with the patient the risks of Carac including but not limited to erythema, scaling, itching, weeping, crusting, and pain. Stelara Counseling:  I discussed with the patient the risks of ustekinumab including but not limited to immunosuppression, malignancy, posterior leukoencephalopathy syndrome, and serious infections.  The patient understands that monitoring is required including a PPD at baseline and must alert us or the primary physician if symptoms of infection or other concerning signs are noted. Opioid Pregnancy And Lactation Text: These medications can lead to premature delivery and should be avoided during pregnancy. These medications are also present in breast milk in small amounts. Xeljanz Counseling: I discussed with the patient the risks of Xeljanz therapy including increased risk of infection, liver issues, headache, diarrhea, or cold symptoms. Live vaccines should be avoided. They were instructed to call if they have any problems. Azithromycin Pregnancy And Lactation Text: This medication is considered safe during pregnancy and is also secreted in breast milk. Soolantra Counseling: I discussed with the patients the risks of topial Soolantra. This is a medicine which decreases the number of mites and inflammation in the skin. You experience burning, stinging, eye irritation or allergic reactions.  Please call our office if you develop any problems from using this medication. High Dose Vitamin A Counseling: Side effects reviewed, pt to contact office should one occur. Propranolol Counseling:  I discussed with the patient the risks of propranolol including but not limited to low heart rate, low blood pressure, low blood sugar, restlessness and increased cold sensitivity. They should call the office if they experience any of these side effects. Cimetidine Counseling:  I discussed with the patient the risks of Cimetidine including but not limited to gynecomastia, headache, diarrhea, nausea, drowsiness, arrhythmias, pancreatitis, skin rashes, psychosis, bone marrow suppression and kidney toxicity. Cyclophosphamide Counseling:  I discussed with the patient the risks of cyclophosphamide including but not limited to hair loss, hormonal abnormalities, decreased fertility, abdominal pain, diarrhea, nausea and vomiting, bone marrow suppression and infection. The patient understands that monitoring is required while taking this medication. Valtrex Pregnancy And Lactation Text: this medication is Pregnancy Category B and is considered safe during pregnancy. This medication is not directly found in breast milk but it's metabolite acyclovir is present. Erythromycin Counseling:  I discussed with the patient the risks of erythromycin including but not limited to GI upset, allergic reaction, drug rash, diarrhea, increase in liver enzymes, and yeast infections. Rifampin Pregnancy And Lactation Text: This medication is Pregnancy Category C and it isn't know if it is safe during pregnancy. It is also excreted in breast milk and should not be used if you are breast feeding. Dupixent Counseling: I discussed with the patient the risks of dupilumab including but not limited to eye inflammation and irritation, cold sores, injection site reactions, allergic reactions and increased risk of parasitic infection. The patient understands that monitoring is required and they must alert us or the primary physician if symptoms of infection or other concerning signs are noted. Cibinqo Pregnancy And Lactation Text: It is unknown if this medication will adversely affect pregnancy or breast feeding.  You should not take this medication if you are currently pregnant or planning a pregnancy or while breastfeeding. Itraconazole Counseling:  I discussed with the patient the risks of itraconazole including but not limited to liver damage, nausea/vomiting, neuropathy, and severe allergy.  The patient understands that this medication is best absorbed when taken with acidic beverages such as non-diet cola or ginger ale.  The patient understands that monitoring is required including baseline LFTs and repeat LFTs at intervals.  The patient understands that they are to contact us or the primary physician if concerning signs are noted. Erivedge Pregnancy And Lactation Text: This medication is Pregnancy Category X and is absolutely contraindicated during pregnancy. It is unknown if it is excreted in breast milk. Topical Metronidazole Counseling: Metronidazole is a topical antibiotic medication. You may experience burning, stinging, redness, or allergic reactions.  Please call our office if you develop any problems from using this medication. Eucrisa Counseling: Patient may experience a mild burning sensation during topical application. Eucrisa is not approved in children less than 3 months of age. Gabapentin Counseling: I discussed with the patient the risks of gabapentin including but not limited to dizziness, somnolence, fatigue and ataxia. Winlevi Pregnancy And Lactation Text: This medication is considered safe during pregnancy and breastfeeding. Finasteride Female Counseling: Finasteride Counseling:  I discussed with the patient the risks of use of finasteride including but not limited to decreased libido and sexual dysfunction. Explained the teratogenic nature of the medication and stressed the importance of not getting pregnant during treatment. All of the patient's questions and concerns were addressed. Niacinamide Pregnancy And Lactation Text: These medications are considered safe during pregnancy. Minoxidil Pregnancy And Lactation Text: This medication has not been assigned a Pregnancy Risk Category but animal studies failed to show danger with the topical medication. It is unknown if the medication is excreted in breast milk. Cyclophosphamide Pregnancy And Lactation Text: This medication is Pregnancy Category D and it isn't considered safe during pregnancy. This medication is excreted in breast milk. Use Enhanced Medication Counseling?: No Soolantra Pregnancy And Lactation Text: This medication is Pregnancy Category C. This medication is considered safe during breast feeding. Xelauraz Pregnancy And Lactation Text: This medication is Pregnancy Category D and is not considered safe during pregnancy.  The risk during breast feeding is also uncertain. High Dose Vitamin A Pregnancy And Lactation Text: High dose vitamin A therapy is contraindicated during pregnancy and breast feeding. Propranolol Pregnancy And Lactation Text: This medication is Pregnancy Category C and it isn't known if it is safe during pregnancy. It is excreted in breast milk. Litfulo Counseling: I discussed with the patient the risks of Litfulo therapy including but not limited to upper respiratory tract infections, shingles, cold sores, and nausea. Live vaccines should be avoided.  This medication has been linked to serious infections; higher rate of mortality; malignancy and lymphoproliferative disorders; major adverse cardiovascular events; thrombosis; gastrointestinal perforations; neutropenia; lymphopenia; anemia; liver enzyme elevations; and lipid elevations. Rituxan Counseling:  I discussed with the patient the risks of Rituxan infusions. Side effects can include infusion reactions, severe drug rashes including mucocutaneous reactions, reactivation of latent hepatitis and other infections and rarely progressive multifocal leukoencephalopathy.  All of the patient's questions and concerns were addressed. Sarecycline Counseling: Patient advised regarding possible photosensitivity and discoloration of the teeth, skin, lips, tongue and gums.  Patient instructed to avoid sunlight, if possible.  When exposed to sunlight, patients should wear protective clothing, sunglasses, and sunscreen.  The patient was instructed to call the office immediately if the following severe adverse effects occur:  hearing changes, easy bruising/bleeding, severe headache, or vision changes.  The patient verbalized understanding of the proper use and possible adverse effects of sarecycline.  All of the patient's questions and concerns were addressed. Libtayo Counseling- I discussed with the patient the risks of Libtayo including but not limited to nausea, vomiting, diarrhea, and bone or muscle pain.  The patient verbalized understanding of the proper use and possible adverse effects of Libtayo.  All of the patient's questions and concerns were addressed. Protopic Counseling: Patient may experience a mild burning sensation during topical application. Protopic is not approved in children less than 2 years of age. There have been case reports of hematologic and skin malignancies in patients using topical calcineurin inhibitors although causality is questionable. Mirvaso Counseling: Mirvaso is a topical medication which can decrease superficial blood flow where applied. Side effects are uncommon and include stinging, redness and allergic reactions. Finasteride Pregnancy And Lactation Text: This medication is absolutely contraindicated during pregnancy. It is unknown if it is excreted in breast milk. VTAMA Counseling: I discussed with the patient that VTAMA is not for use in the eyes, mouth or mouth. They should call the office if they develop any signs of allergic reactions to VTAMA. The patient verbalized understanding of the proper use and possible adverse effects of VTAMA.  All of the patient's questions and concerns were addressed. Bactrim Counseling:  I discussed with the patient the risks of sulfa antibiotics including but not limited to GI upset, allergic reaction, drug rash, diarrhea, dizziness, photosensitivity, and yeast infections.  Rarely, more serious reactions can occur including but not limited to aplastic anemia, agranulocytosis, methemoglobinemia, blood dyscrasias, liver or kidney failure, lung infiltrates or desquamative/blistering drug rashes. Nsaids Counseling: NSAID Counseling: I discussed with the patient that NSAIDs should be taken with food. Prolonged use of NSAIDs can result in the development of stomach ulcers.  Patient advised to stop taking NSAIDs if abdominal pain occurs.  The patient verbalized understanding of the proper use and possible adverse effects of NSAIDs.  All of the patient's questions and concerns were addressed. Erythromycin Pregnancy And Lactation Text: This medication is Pregnancy Category B and is considered safe during pregnancy. It is also excreted in breast milk. Doxepin Counseling:  Patient advised that the medication is sedating and not to drive a car after taking this medication. Patient informed of potential adverse effects including but not limited to dry mouth, urinary retention, and blurry vision.  The patient verbalized understanding of the proper use and possible adverse effects of doxepin.  All of the patient's questions and concerns were addressed. SSKI Counseling:  I discussed with the patient the risks of SSKI including but not limited to thyroid abnormalities, metallic taste, GI upset, fever, headache, acne, arthralgias, paraesthesias, lymphadenopathy, easy bleeding, arrhythmias, and allergic reaction. Cyclosporine Counseling:  I discussed with the patient the risks of cyclosporine including but not limited to hypertension, gingival hyperplasia,myelosuppression, immunosuppression, liver damage, kidney damage, neurotoxicity, lymphoma, and serious infections. The patient understands that monitoring is required including baseline blood pressure, CBC, CMP, lipid panel and uric acid, and then 1-2 times monthly CMP and blood pressure. Topical Retinoid counseling:  Patient advised to apply a pea-sized amount only at bedtime and wait 30 minutes after washing their face before applying.  If too drying, patient may add a non-comedogenic moisturizer. The patient verbalized understanding of the proper use and possible adverse effects of retinoids.  All of the patient's questions and concerns were addressed. Calcipotriene Counseling:  I discussed with the patient the risks of calcipotriene including but not limited to erythema, scaling, itching, and irritation. Arava Counseling:  Patient counseled regarding adverse effects of Arava including but not limited to nausea, vomiting, abnormalities in liver function tests. Patients may develop mouth sores, rash, diarrhea, and abnormalities in blood counts. The patient understands that monitoring is required including LFTs and blood counts.  There is a rare possibility of scarring of the liver and lung problems that can occur when taking methotrexate. Persistent nausea, loss of appetite, pale stools, dark urine, cough, and shortness of breath should be reported immediately. Patient advised to discontinue Arava treatment and consult with a physician prior to attempting conception. The patient will have to undergo a treatment to eliminate Arava from the body prior to conception. Dupixent Pregnancy And Lactation Text: This medication likely crosses the placenta but the risk for the fetus is uncertain. This medication is excreted in breast milk. Adbry Counseling: I discussed with the patient the risks of tralokinumab including but not limited to eye infection and irritation, cold sores, injection site reactions, worsening of asthma, allergic reactions and increased risk of parasitic infection.  Live vaccines should be avoided while taking tralokinumab. The patient understands that monitoring is required and they must alert us or the primary physician if symptoms of infection or other concerning signs are noted. Topical Metronidazole Pregnancy And Lactation Text: This medication is Pregnancy Category B and considered safe during pregnancy.  It is also considered safe to use while breastfeeding. Libtayo Pregnancy And Lactation Text: This medication is contraindicated in pregnancy and when breast feeding. Taltz Counseling: I discussed with the patient the risks of ixekizumab including but not limited to immunosuppression, serious infections, worsening of inflammatory bowel disease and drug reactions.  The patient understands that monitoring is required including a PPD at baseline and must alert us or the primary physician if symptoms of infection or other concerning signs are noted. Sarecycline Pregnancy And Lactation Text: This medication is Pregnancy Category D and not consider safe during pregnancy. It is also excreted in breast milk. Protopic Pregnancy And Lactation Text: This medication is Pregnancy Category C. It is unknown if this medication is excreted in breast milk when applied topically. Olanzapine Pregnancy And Lactation Text: This medication is pregnancy category C.   There are no adequate and well controlled trials with olanzapine in pregnant females.  Olanzapine should be used during pregnancy only if the potential benefit justifies the potential risk to the fetus.   In a study in lactating healthy women, olanzapine was excreted in breast milk.  It is recommended that women taking olanzapine should not breast feed. Vtama Pregnancy And Lactation Text: It is unknown if this medication can cause problems during pregnancy and breastfeeding. Birth Control Pills Counseling: Birth Control Pill Counseling: I discussed with the patient the potential side effects of OCPs including but not limited to increased risk of stroke, heart attack, thrombophlebitis, deep venous thrombosis, hepatic adenomas, breast changes, GI upset, headaches, and depression.  The patient verbalized understanding of the proper use and possible adverse effects of OCPs. All of the patient's questions and concerns were addressed. Litfulo Pregnancy And Lactation Text: Based on animal studies, Lifulo may cause embryo-fetal harm when administered to pregnant women.  The medication should not be used in pregnancy.  Breastfeeding is not recommended during treatment. Nsaids Pregnancy And Lactation Text: These medications are considered safe up to 30 weeks gestation. It is excreted in breast milk. Ketoconazole Counseling:   Patient counseled regarding improving absorption with orange juice.  Adverse effects include but are not limited to breast enlargement, headache, diarrhea, nausea, upset stomach, liver function test abnormalities, taste disturbance, and stomach pain.  There is a rare possibility of liver failure that can occur when taking ketoconazole. The patient understands that monitoring of LFTs may be required, especially at baseline. The patient verbalized understanding of the proper use and possible adverse effects of ketoconazole.  All of the patient's questions and concerns were addressed. Calcipotriene Pregnancy And Lactation Text: The use of this medication during pregnancy or lactation is not recommended as there is insufficient data. Metronidazole Counseling:  I discussed with the patient the risks of metronidazole including but not limited to seizures, nausea/vomiting, a metallic taste in the mouth, nausea/vomiting and severe allergy. Acitretin Counseling:  I discussed with the patient the risks of acitretin including but not limited to hair loss, dry lips/skin/eyes, liver damage, hyperlipidemia, depression/suicidal ideation, photosensitivity.  Serious rare side effects can include but are not limited to pancreatitis, pseudotumor cerebri, bony changes, clot formation/stroke/heart attack.  Patient understands that alcohol is contraindicated since it can result in liver toxicity and significantly prolong the elimination of the drug by many years. Hydroquinone Counseling:  Patient advised that medication may result in skin irritation, lightening (hypopigmentation), dryness, and burning.  In the event of skin irritation, the patient was advised to reduce the amount of the drug applied or use it less frequently.  Rarely, spots that are treated with hydroquinone can become darker (pseudoochronosis).  Should this occur, patient instructed to stop medication and call the office. The patient verbalized understanding of the proper use and possible adverse effects of hydroquinone.  All of the patient's questions and concerns were addressed. Topical Steroids Counseling: I discussed with the patient that prolonged use of topical steroids can result in the increased appearance of superficial blood vessels (telangiectasias), lightening (hypopigmentation) and thinning of the skin (atrophy).  Patient understands to avoid using high potency steroids in skin folds, the groin or the face.  The patient verbalized understanding of the proper use and possible adverse effects of topical steroids.  All of the patient's questions and concerns were addressed. Enbrel Counseling:  I discussed with the patient the risks of etanercept including but not limited to myelosuppression, immunosuppression, autoimmune hepatitis, demyelinating diseases, lymphoma, and infections.  The patient understands that monitoring is required including a PPD at baseline and must alert us or the primary physician if symptoms of infection or other concerning signs are noted. Bactrim Pregnancy And Lactation Text: This medication is Pregnancy Category D and is known to cause fetal risk.  It is also excreted in breast milk. Glycopyrrolate Counseling:  I discussed with the patient the risks of glycopyrrolate including but not limited to skin rash, drowsiness, dry mouth, difficulty urinating, and blurred vision. Rituxan Pregnancy And Lactation Text: This medication is Pregnancy Category C and it isn't know if it is safe during pregnancy. It is unknown if this medication is excreted in breast milk but similar antibodies are known to be excreted. Adbry Pregnancy And Lactation Text: It is unknown if this medication will adversely affect pregnancy or breast feeding. Oral Minoxidil Counseling- I discussed with the patient the risks of oral minoxidil including but not limited to shortness of breath, swelling of the feet or ankles, dizziness, lightheadedness, unwanted hair growth and allergic reaction.  The patient verbalized understanding of the proper use and possible adverse effects of oral minoxidil.  All of the patient's questions and concerns were addressed. Aklief counseling:  Patient advised to apply a pea-sized amount only at bedtime and wait 30 minutes after washing their face before applying.  If too drying, patient may add a non-comedogenic moisturizer.  The most commonly reported side effects including irritation, redness, scaling, dryness, stinging, burning, itching, and increased risk of sunburn.  The patient verbalized understanding of the proper use and possible adverse effects of retinoids.  All of the patient's questions and concerns were addressed. Qbrexza Counseling:  I discussed with the patient the risks of Qbrexza including but not limited to headache, mydriasis, blurred vision, dry eyes, nasal dryness, dry mouth, dry throat, dry skin, urinary hesitation, and constipation.  Local skin reactions including erythema, burning, stinging, and itching can also occur. Cantharidin Counseling:  I discussed with the patient the risks of Cantharidin including but not limited to pain, redness, burning, itching, and blistering. Doxepin Pregnancy And Lactation Text: This medication is Pregnancy Category C and it isn't known if it is safe during pregnancy. It is also excreted in breast milk and breast feeding isn't recommended. Sski Pregnancy And Lactation Text: This medication is Pregnancy Category D and isn't considered safe during pregnancy. It is excreted in breast milk. Metronidazole Pregnancy And Lactation Text: This medication is Pregnancy Category B and considered safe during pregnancy.  It is also excreted in breast milk. Cantharidin Pregnancy And Lactation Text: This medication has not been proven safe during pregnancy. It is unknown if this medication is excreted in breast milk. Tetracycline Counseling: Patient counseled regarding possible photosensitivity and increased risk for sunburn.  Patient instructed to avoid sunlight, if possible.  When exposed to sunlight, patients should wear protective clothing, sunglasses, and sunscreen.  The patient was instructed to call the office immediately if the following severe adverse effects occur:  hearing changes, easy bruising/bleeding, severe headache, or vision changes.  The patient verbalized understanding of the proper use and possible adverse effects of tetracycline.  All of the patient's questions and concerns were addressed. Patient understands to avoid pregnancy while on therapy due to potential birth defects. Ketoconazole Pregnancy And Lactation Text: This medication is Pregnancy Category C and it isn't know if it is safe during pregnancy. It is also excreted in breast milk and breast feeding isn't recommended. Odomzo Counseling- I discussed with the patient the risks of Odomzo including but not limited to nausea, vomiting, diarrhea, constipation, weight loss, changes in the sense of taste, decreased appetite, muscle spasms, and hair loss.  The patient verbalized understanding of the proper use and possible adverse effects of Odomzo.  All of the patient's questions and concerns were addressed. Bimzelx Counseling:  I discussed with the patient the risks of Bimzelx including but not limited to depression, immunosuppression, allergic reactions and infections.  The patient understands that monitoring is required including a PPD at baseline and must alert us or the primary physician if symptoms of infection or other concerning signs are noted. Topical Steroids Applications Pregnancy And Lactation Text: Most topical steroids are considered safe to use during pregnancy and lactation.  Any topical steroid applied to the breast or nipple should be washed off before breastfeeding. Glycopyrrolate Pregnancy And Lactation Text: This medication is Pregnancy Category B and is considered safe during pregnancy. It is unknown if it is excreted breast milk. Olumiant Counseling: I discussed with the patient the risks of Olumiant therapy including but not limited to upper respiratory tract infections, shingles, cold sores, and nausea. Live vaccines should be avoided.  This medication has been linked to serious infections; higher rate of mortality; malignancy and lymphoproliferative disorders; major adverse cardiovascular events; thrombosis; gastrointestinal perforations; neutropenia; lymphopenia; anemia; liver enzyme elevations; and lipid elevations. Siliq Counseling:  I discussed with the patient the risks of Siliq including but not limited to new or worsening depression, suicidal thoughts and behavior, immunosuppression, malignancy, posterior leukoencephalopathy syndrome, and serious infections.  The patient understands that monitoring is required including a PPD at baseline and must alert us or the primary physician if symptoms of infection or other concerning signs are noted. There is also a special program designed to monitor depression which is required with Siliq. Olanzapine Counseling- I discussed with the patient the common side effects of olanzapine including but are not limited to: lack of energy, dry mouth, increased appetite, sleepiness, tremor, constipation, dizziness, changes in behavior, or restlessness.  Explained that teenagers are more likely to experience headaches, abdominal pain, pain in the arms or legs, tiredness, and sleepiness.  Serious side effects include but are not limited: increased risk of death in elderly patients who are confused, have memory loss, or dementia-related psychosis; hyperglycemia; increased cholesterol and triglycerides; and weight gain. Birth Control Pills Pregnancy And Lactation Text: This medication should be avoided if pregnant and for the first 30 days post-partum. Zoryve Counseling:  I discussed with the patient that Zoryve is not for use in the eyes, mouth or vagina. The most commonly reported side effects include diarrhea, headache, insomnia, application site pain, upper respiratory tract infections, and urinary tract infections.  All of the patient's questions and concerns were addressed. Spironolactone Pregnancy And Lactation Text: This medication can cause feminization of the male fetus and should be avoided during pregnancy. The active metabolite is also found in breast milk. Detail Level: Simple Clofazimine Counseling:  I discussed with the patient the risks of clofazimine including but not limited to skin and eye pigmentation, liver damage, nausea/vomiting, gastrointestinal bleeding and allergy. Acitretin Pregnancy And Lactation Text: This medication is Pregnancy Category X and should not be given to women who are pregnant or may become pregnant in the future. This medication is excreted in breast milk. Cephalexin Counseling: I counseled the patient regarding use of cephalexin as an antibiotic for prophylactic and/or therapeutic purposes. Cephalexin (commonly prescribed under brand name Keflex) is a cephalosporin antibiotic which is active against numerous classes of bacteria, including most skin bacteria. Side effects may include nausea, diarrhea, gastrointestinal upset, rash, hives, yeast infections, and in rare cases, hepatitis, kidney disease, seizures, fever, confusion, neurologic symptoms, and others. Patients with severe allergies to penicillin medications are cautioned that there is about a 10% incidence of cross-reactivity with cephalosporins. When possible, patients with penicillin allergies should use alternatives to cephalosporins for antibiotic therapy. 5-Fu Counseling: 5-Fluorouracil Counseling:  I discussed with the patient the risks of 5-fluorouracil including but not limited to erythema, scaling, itching, weeping, crusting, and pain. Oral Minoxidil Pregnancy And Lactation Text: This medication should only be used when clearly needed if you are pregnant, attempting to become pregnant or breast feeding. Thalidomide Counseling: I discussed with the patient the risks of thalidomide including but not limited to birth defects, anxiety, weakness, chest pain, dizziness, cough and severe allergy. Aklief Pregnancy And Lactation Text: It is unknown if this medication is safe to use during pregnancy.  It is unknown if this medication is excreted in breast milk.  Breastfeeding women should use the topical cream on the smallest area of the skin for the shortest time needed while breastfeeding.  Do not apply to nipple and areola. Hydroxyzine Counseling: Patient advised that the medication is sedating and not to drive a car after taking this medication.  Patient informed of potential adverse effects including but not limited to dry mouth, urinary retention, and blurry vision.  The patient verbalized understanding of the proper use and possible adverse effects of hydroxyzine.  All of the patient's questions and concerns were addressed. Qbrexza Pregnancy And Lactation Text: There is no available data on Qbrexza use in pregnant women.  There is no available data on Qbrexza use in lactation. Methotrexate Counseling:  Patient counseled regarding adverse effects of methotrexate including but not limited to nausea, vomiting, abnormalities in liver function tests. Patients may develop mouth sores, rash, diarrhea, and abnormalities in blood counts. The patient understands that monitoring is required including LFT's and blood counts.  There is a rare possibility of scarring of the liver and lung problems that can occur when taking methotrexate. Persistent nausea, loss of appetite, pale stools, dark urine, cough, and shortness of breath should be reported immediately. Patient advised to discontinue methotrexate treatment at least three months before attempting to become pregnant.  I discussed the need for folate supplements while taking methotrexate.  These supplements can decrease side effects during methotrexate treatment. The patient verbalized understanding of the proper use and possible adverse effects of methotrexate.  All of the patient's questions and concerns were addressed. Tremfya Counseling: I discussed with the patient the risks of guselkumab including but not limited to immunosuppression, serious infections, and drug reactions.  The patient understands that monitoring is required including a PPD at baseline and must alert us or the primary physician if symptoms of infection or other concerning signs are noted. Tazorac Counseling:  Patient advised that medication is irritating and drying.  Patient may need to apply sparingly and wash off after an hour before eventually leaving it on overnight.  The patient verbalized understanding of the proper use and possible adverse effects of tazorac.  All of the patient's questions and concerns were addressed. Hydroxychloroquine Counseling:  I discussed with the patient that a baseline ophthalmologic exam is needed at the start of therapy and every year thereafter while on therapy. A CBC may also be warranted for monitoring.  The side effects of this medication were discussed with the patient, including but not limited to agranulocytosis, aplastic anemia, seizures, rashes, retinopathy, and liver toxicity. Patient instructed to call the office should any adverse effect occur.  The patient verbalized understanding of the proper use and possible adverse effects of Plaquenil.  All the patient's questions and concerns were addressed. Spironolactone Counseling: Patient advised regarding risks of diarrhea, abdominal pain, hyperkalemia, birth defects (for female patients), liver toxicity and renal toxicity. The patient may need blood work to monitor liver and kidney function and potassium levels while on therapy. The patient verbalized understanding of the proper use and possible adverse effects of spironolactone.  All of the patient's questions and concerns were addressed. Minocycline Counseling: Patient advised regarding possible photosensitivity and discoloration of the teeth, skin, lips, tongue and gums.  Patient instructed to avoid sunlight, if possible.  When exposed to sunlight, patients should wear protective clothing, sunglasses, and sunscreen.  The patient was instructed to call the office immediately if the following severe adverse effects occur:  hearing changes, easy bruising/bleeding, severe headache, or vision changes.  The patient verbalized understanding of the proper use and possible adverse effects of minocycline.  All of the patient's questions and concerns were addressed. Olumiant Pregnancy And Lactation Text: Based on animal studies, Olumiant may cause embryo-fetal harm when administered to pregnant women.  The medication should not be used in pregnancy.  Breastfeeding is not recommended during treatment. Cephalexin Pregnancy And Lactation Text: This medication is Pregnancy Category B and considered safe during pregnancy.  It is also excreted in breast milk but can be used safely for shorter doses. Topical Sulfur Applications Counseling: Topical Sulfur Counseling: Patient counseled that this medication may cause skin irritation or allergic reactions.  In the event of skin irritation, the patient was advised to reduce the amount of the drug applied or use it less frequently.   The patient verbalized understanding of the proper use and possible adverse effects of topical sulfur application.  All of the patient's questions and concerns were addressed. Imiquimod Counseling:  I discussed with the patient the risks of imiquimod including but not limited to erythema, scaling, itching, weeping, crusting, and pain.  Patient understands that the inflammatory response to imiquimod is variable from person to person and was educated regarded proper titration schedule.  If flu-like symptoms develop, patient knows to discontinue the medication and contact us. Bimzelx Pregnancy And Lactation Text: This medication crosses the placenta and the safety is uncertain during pregnancy. It is unknown if this medication is present in breast milk. Bexarotene Counseling:  I discussed with the patient the risks of bexarotene including but not limited to hair loss, dry lips/skin/eyes, liver abnormalities, hyperlipidemia, pancreatitis, depression/suicidal ideation, photosensitivity, drug rash/allergic reactions, hypothyroidism, anemia, leukopenia, infection, cataracts, and teratogenicity.  Patient understands that they will need regular blood tests to check lipid profile, liver function tests, white blood cell count, thyroid function tests and pregnancy test if applicable. Methotrexate Pregnancy And Lactation Text: This medication is Pregnancy Category X and is known to cause fetal harm. This medication is excreted in breast milk. Tazorac Pregnancy And Lactation Text: This medication is not safe during pregnancy. It is unknown if this medication is excreted in breast milk. Humira Counseling:  I discussed with the patient the risks of adalimumab including but not limited to myelosuppression, immunosuppression, autoimmune hepatitis, demyelinating diseases, lymphoma, and serious infections.  The patient understands that monitoring is required including a PPD at baseline and must alert us or the primary physician if symptoms of infection or other concerning signs are noted. Hydroxyzine Pregnancy And Lactation Text: This medication is not safe during pregnancy and should not be taken. It is also excreted in breast milk and breast feeding isn't recommended. Azelaic Acid Counseling: Patient counseled that medicine may cause skin irritation and to avoid applying near the eyes.  In the event of skin irritation, the patient was advised to reduce the amount of the drug applied or use it less frequently.   The patient verbalized understanding of the proper use and possible adverse effects of azelaic acid.  All of the patient's questions and concerns were addressed. Rinvoq Counseling: I discussed with the patient the risks of Rinvoq therapy including but not limited to upper respiratory tract infections, shingles, cold sores, bronchitis, nausea, cough, fever, acne, and headache. Live vaccines should be avoided.  This medication has been linked to serious infections; higher rate of mortality; malignancy and lymphoproliferative disorders; major adverse cardiovascular events; thrombosis; thrombocytopenia, anemia, and neutropenia; lipid elevations; liver enzyme elevations; and gastrointestinal perforations. Simponi Counseling:  I discussed with the patient the risks of golimumab including but not limited to myelosuppression, immunosuppression, autoimmune hepatitis, demyelinating diseases, lymphoma, and serious infections.  The patient understands that monitoring is required including a PPD at baseline and must alert us or the primary physician if symptoms of infection or other concerning signs are noted. Rhofade Counseling: Rhofade is a topical medication which can decrease superficial blood flow where applied. Side effects are uncommon and include stinging, redness and allergic reactions. Otezla Counseling: The side effects of Otezla were discussed with the patient, including but not limited to worsening or new depression, weight loss, diarrhea, nausea, upper respiratory tract infection, and headache. Patient instructed to call the office should any adverse effect occur.  The patient verbalized understanding of the proper use and possible adverse effects of Otezla.  All the patient's questions and concerns were addressed. Azathioprine Counseling:  I discussed with the patient the risks of azathioprine including but not limited to myelosuppression, immunosuppression, hepatotoxicity, lymphoma, and infections.  The patient understands that monitoring is required including baseline LFTs, Creatinine, possible TPMP genotyping and weekly CBCs for the first month and then every 2 weeks thereafter.  The patient verbalized understanding of the proper use and possible adverse effects of azathioprine.  All of the patient's questions and concerns were addressed. Clindamycin Counseling: I counseled the patient regarding use of clindamycin as an antibiotic for prophylactic and/or therapeutic purposes. Clindamycin is active against numerous classes of bacteria, including skin bacteria. Side effects may include nausea, diarrhea, gastrointestinal upset, rash, hives, yeast infections, and in rare cases, colitis. Fluconazole Counseling:  Patient counseled regarding adverse effects of fluconazole including but not limited to headache, diarrhea, nausea, upset stomach, liver function test abnormalities, taste disturbance, and stomach pain.  There is a rare possibility of liver failure that can occur when taking fluconazole.  The patient understands that monitoring of LFTs and kidney function test may be required, especially at baseline. The patient verbalized understanding of the proper use and possible adverse effects of fluconazole.  All of the patient's questions and concerns were addressed. Cimzia Counseling:  I discussed with the patient the risks of Cimzia including but not limited to immunosuppression, allergic reactions and infections.  The patient understands that monitoring is required including a PPD at baseline and must alert us or the primary physician if symptoms of infection or other concerning signs are noted.

## 2024-01-30 NOTE — PROGRESS NOTES
Outpatient Care Management  Initial Patient Assessment    Patient: Anayeli Judd  MRN: [id number]  Date of Service: 01/30/2024  Completed by: Elizabeth Lea RN  Referral Date: 01/22/2024  Date of Eligibility: 1/23/2024  Program: High Risk  Status: Ongoing  Effective Dates: 1/30/2024 - present  Responsible Staff: Elizabeth Lea RN        Reason for Visit   Patient presents with    OPCM Enrollment Call    Nursing Assessment    OPCM Chart Review       Brief Summary:  Anayeli Judd was referred by Elissa Canseco NP for SBO. Patient qualifies for program based on 74.5% risk score.   Active problem list, medical, surgical and social history reviewed. 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 Areas of need identified by patient include assist managing prevention of SBO, learn about fall prevention.   Patient denies issues with SDOH. Informed of LSW availability to assists with needs if any arise.  Next steps: plan to follow up in approximately one week - patient agrees. Discuss water intake, diet, activity, last BM, review fall precautions.     Disability Status  Is the patient alert and oriented (person, place, time, and situation)?: Alert and oriented x 4  Hearing Difficulty or Deaf: yes  Hearing Management: other (hearing aid)  Visual Difficulty or Blind: no  Visual and Hearing Needs Conclusion: readers used  Difficulty Concentrating, Remembering or Making Decisions: no  Communication Difficulty: no  Eating/Swallowing Difficulty: yes  Eating/Swallowing: swallowing solid food  Eating/Swallowing Management: -- (says she sometimes as issues swallowing some food)  Walking or Climbing Stairs Difficulty: yes  Walking or Climbing Stairs: ambulation difficulty, requires equipment; transferring difficulty, requires equipment  Mobility Management: cane and walker  Dressing/Bathing  Difficulty: yes  Dressing/Bathing: dressing difficulty, assistance 1 person (says she needs help with socks sometimes)  Toileting : Independent  Continence : Incontinence - Bowel; Incontience - Bladder  Difficulty Managing Errands Independently: yes  Errands Management: sitter assists  Equipment Currently Used at Home: cane, straight; glucometer; walker, rolling  ADL Conclusion Statement: independent for most part, requires some assistance at times with socks  Change in Functional Status Since Onset of Current Illness/Injury: no        Spiritual Beliefs  Spiritual, Cultural Beliefs, Lutheran Practices, Values that Affect Care: no      Social History     Socioeconomic History    Marital status:    Tobacco Use    Smoking status: Never    Smokeless tobacco: Never   Substance and Sexual Activity    Alcohol use: Not Currently     Comment: socially, rarely    Drug use: Never    Sexual activity: Not Currently   Social History Narrative    , lives alone.  Primary support are her children and friends.     Social Determinants of Health     Financial Resource Strain: Low Risk  (1/30/2024)    Overall Financial Resource Strain (CARDIA)     Difficulty of Paying Living Expenses: Not hard at all   Food Insecurity: No Food Insecurity (1/30/2024)    Hunger Vital Sign     Worried About Running Out of Food in the Last Year: Never true     Ran Out of Food in the Last Year: Never true   Transportation Needs: No Transportation Needs (1/30/2024)    PRAPARE - Transportation     Lack of Transportation (Medical): No     Lack of Transportation (Non-Medical): No   Physical Activity: Inactive (1/30/2024)    Exercise Vital Sign     Days of Exercise per Week: 0 days     Minutes of Exercise per Session: 0 min   Stress: No Stress Concern Present (1/30/2024)    Citizen of [place] of Occupational Health - Occupational Stress Questionnaire     Feeling of Stress : Not at all   Social Connections: Moderately Isolated (1/30/2024)    Social  Connection and Isolation Panel [NHANES]     Frequency of Communication with Friends and Family: More than three times a week     Frequency of Social Gatherings with Friends and Family: More than three times a week     Attends Congregational Services: 1 to 4 times per year     Active Member of Clubs or Organizations: No     Attends Club or Organization Meetings: Never     Marital Status:    Housing Stability: Low Risk  (1/30/2024)    Housing Stability Vital Sign     Unable to Pay for Housing in the Last Year: No     Number of Places Lived in the Last Year: 1     Unstable Housing in the Last Year: No       Roles and Relationships  Primary Source of Support/Comfort: child(franck)  Name of Support/Comfort Primary Source: Danielle and Kimberly  Secondary Source of Support/Comfort: nonrelative caregiver      Advance Directives (For Healthcare)  Advance Directive  (If Adv Dir status is received, view document under Adv Dir in header or Chart Review Media tab): Advance Directive currently in Epic.        Patient Reported Insurance  Verified current insurance plan:: Medicare            1/30/2024    11:26 AM 1/5/2023     4:02 PM 12/22/2022    11:40 AM 10/31/2022    10:50 AM 10/4/2022     2:35 PM 3/15/2022     1:15 PM 10/8/2021     1:34 PM   Depression Patient Health Questionnaire   Over the last two weeks how often have you been bothered by little interest or pleasure in doing things Not at all Not at all Several days Not at all Not at all Not at all Not at all   Over the last two weeks how often have you been bothered by feeling down, depressed or hopeless Not at all Not at all Several days Not at all Not at all Not at all Not at all   PHQ-2 Total Score 0 0 2 0 0 0 0       Learning Assessment       01/30/2024 1131 [facility] (1/30/2024 - Present)   Created by Elizabeth Lea, RN -  (Nurse) Status: Complete                 PRIMARY LEARNER     Primary Learner Name:  Ms. vicente June BD - 01/30/2024 1131     Relationship:  Patient BD - 01/30/2024 1131    Does the primary learner have any barriers to learning?:  Hearing BD - 01/30/2024 1131    Comment: wears hearing aids     What is the preferred language of the primary learner?:  English BD - 01/30/2024 1131    Is an  required?:  No BD - 01/30/2024 1131    How does the primary learner prefer to learn new concepts?:  Listening, Reading, Demonstration, Pictures/Video BD - 01/30/2024 1131    How often do you need to have someone help you read instructions, pamphlets, or written material from your doctor or pharmacy?:  Never BD - 01/30/2024 1131        CO-LEARNER #1     No question answered        CO-LEARNER #2     No question answered        SPECIAL TOPICS     No question answered        ANSWERED BY:     -:  Patient BD - 01/30/2024 1131        Edit History       Elizabeth Lea, RN -  (Nurse)   01/30/2024 1131

## 2024-01-30 NOTE — TELEPHONE ENCOUNTER
Refill Authorization Note     Refill Decision Note   Anayeli Judd  is requesting a refill authorization.  Brief Assessment and Rationale for Refill:  Approve     Medication Therapy Plan:       Medication Reconciliation Completed: No   Comments:     No Care Gaps recommended.     Note composed:3:05 PM 01/30/2024

## 2024-02-05 ENCOUNTER — HOSPITAL ENCOUNTER (EMERGENCY)
Facility: HOSPITAL | Age: 79
Discharge: HOME | End: 2024-02-06
Attending: EMERGENCY MEDICINE
Payer: MEDICARE

## 2024-02-05 DIAGNOSIS — K56.609 SBO (SMALL BOWEL OBSTRUCTION): ICD-10-CM

## 2024-02-05 DIAGNOSIS — K56.699 STRICTURE INTESTINAL: ICD-10-CM

## 2024-02-05 DIAGNOSIS — R10.9 ABDOMINAL PAIN: ICD-10-CM

## 2024-02-05 DIAGNOSIS — K59.1 FUNCTIONAL DIARRHEA: Primary | ICD-10-CM

## 2024-02-05 LAB
ALBUMIN SERPL BCP-MCNC: 3.5 G/DL (ref 3.5–5.2)
ALLENS TEST: NORMAL
ALP SERPL-CCNC: 64 U/L (ref 55–135)
ALT SERPL W/O P-5'-P-CCNC: 17 U/L (ref 10–44)
ANION GAP SERPL CALC-SCNC: 10 MMOL/L (ref 8–16)
AST SERPL-CCNC: 29 U/L (ref 10–40)
BACTERIA #/AREA URNS AUTO: ABNORMAL /HPF
BASOPHILS # BLD AUTO: 0.03 K/UL (ref 0–0.2)
BASOPHILS NFR BLD: 0.5 % (ref 0–1.9)
BILIRUB SERPL-MCNC: 0.8 MG/DL (ref 0.1–1)
BILIRUB UR QL STRIP: NEGATIVE
BUN SERPL-MCNC: 14 MG/DL (ref 8–23)
CALCIUM SERPL-MCNC: 9.4 MG/DL (ref 8.7–10.5)
CHLORIDE SERPL-SCNC: 105 MMOL/L (ref 95–110)
CLARITY UR REFRACT.AUTO: ABNORMAL
CO2 SERPL-SCNC: 21 MMOL/L (ref 23–29)
COLOR UR AUTO: YELLOW
CREAT SERPL-MCNC: 0.9 MG/DL (ref 0.5–1.4)
DIFFERENTIAL METHOD BLD: ABNORMAL
EOSINOPHIL # BLD AUTO: 0.2 K/UL (ref 0–0.5)
EOSINOPHIL NFR BLD: 2.3 % (ref 0–8)
ERYTHROCYTE [DISTWIDTH] IN BLOOD BY AUTOMATED COUNT: 13.3 % (ref 11.5–14.5)
EST. GFR  (NO RACE VARIABLE): >60 ML/MIN/1.73 M^2
GLUCOSE SERPL-MCNC: 138 MG/DL (ref 70–110)
GLUCOSE UR QL STRIP: NEGATIVE
HCT VFR BLD AUTO: 34.6 % (ref 37–48.5)
HGB BLD-MCNC: 11.6 G/DL (ref 12–16)
HGB UR QL STRIP: ABNORMAL
IMM GRANULOCYTES # BLD AUTO: 0.02 K/UL (ref 0–0.04)
IMM GRANULOCYTES NFR BLD AUTO: 0.3 % (ref 0–0.5)
KETONES UR QL STRIP: NEGATIVE
LDH SERPL L TO P-CCNC: 1.9 MMOL/L (ref 0.5–2.2)
LEUKOCYTE ESTERASE UR QL STRIP: ABNORMAL
LIPASE SERPL-CCNC: 13 U/L (ref 4–60)
LYMPHOCYTES # BLD AUTO: 1.1 K/UL (ref 1–4.8)
LYMPHOCYTES NFR BLD: 17 % (ref 18–48)
MCH RBC QN AUTO: 33.7 PG (ref 27–31)
MCHC RBC AUTO-ENTMCNC: 33.5 G/DL (ref 32–36)
MCV RBC AUTO: 101 FL (ref 82–98)
MICROSCOPIC COMMENT: ABNORMAL
MONOCYTES # BLD AUTO: 0.6 K/UL (ref 0.3–1)
MONOCYTES NFR BLD: 8.9 % (ref 4–15)
NEUTROPHILS # BLD AUTO: 4.7 K/UL (ref 1.8–7.7)
NEUTROPHILS NFR BLD: 71 % (ref 38–73)
NITRITE UR QL STRIP: NEGATIVE
NRBC BLD-RTO: 0 /100 WBC
OHS QRS DURATION: 70 MS
OHS QTC CALCULATION: 454 MS
PH UR STRIP: 6 [PH] (ref 5–8)
PLATELET # BLD AUTO: 75 K/UL (ref 150–450)
PMV BLD AUTO: 10.4 FL (ref 9.2–12.9)
POTASSIUM SERPL-SCNC: 4.5 MMOL/L (ref 3.5–5.1)
PROT SERPL-MCNC: 7 G/DL (ref 6–8.4)
PROT UR QL STRIP: NEGATIVE
RBC # BLD AUTO: 3.44 M/UL (ref 4–5.4)
RBC #/AREA URNS AUTO: 25 /HPF (ref 0–4)
SAMPLE: NORMAL
SITE: NORMAL
SODIUM SERPL-SCNC: 136 MMOL/L (ref 136–145)
SP GR UR STRIP: 1.01 (ref 1–1.03)
SQUAMOUS #/AREA URNS AUTO: 2 /HPF
TROPONIN I SERPL DL<=0.01 NG/ML-MCNC: <0.006 NG/ML (ref 0–0.03)
URN SPEC COLLECT METH UR: ABNORMAL
WBC # BLD AUTO: 6.6 K/UL (ref 3.9–12.7)
WBC #/AREA URNS AUTO: >100 /HPF (ref 0–5)
WBC CLUMPS UR QL AUTO: ABNORMAL

## 2024-02-05 PROCEDURE — 81001 URINALYSIS AUTO W/SCOPE: CPT | Performed by: EMERGENCY MEDICINE

## 2024-02-05 PROCEDURE — 80053 COMPREHEN METABOLIC PANEL: CPT | Performed by: EMERGENCY MEDICINE

## 2024-02-05 PROCEDURE — 63600175 PHARM REV CODE 636 W HCPCS

## 2024-02-05 PROCEDURE — 85025 COMPLETE CBC W/AUTO DIFF WBC: CPT | Performed by: EMERGENCY MEDICINE

## 2024-02-05 PROCEDURE — 96374 THER/PROPH/DIAG INJ IV PUSH: CPT

## 2024-02-05 PROCEDURE — 87086 URINE CULTURE/COLONY COUNT: CPT | Performed by: EMERGENCY MEDICINE

## 2024-02-05 PROCEDURE — 87077 CULTURE AEROBIC IDENTIFY: CPT | Performed by: EMERGENCY MEDICINE

## 2024-02-05 PROCEDURE — 25000003 PHARM REV CODE 250: Performed by: EMERGENCY MEDICINE

## 2024-02-05 PROCEDURE — 25000003 PHARM REV CODE 250

## 2024-02-05 PROCEDURE — 96361 HYDRATE IV INFUSION ADD-ON: CPT

## 2024-02-05 PROCEDURE — 87186 SC STD MICRODIL/AGAR DIL: CPT | Performed by: EMERGENCY MEDICINE

## 2024-02-05 PROCEDURE — 93010 ELECTROCARDIOGRAM REPORT: CPT | Mod: ,,, | Performed by: INTERNAL MEDICINE

## 2024-02-05 PROCEDURE — 25500020 PHARM REV CODE 255: Performed by: EMERGENCY MEDICINE

## 2024-02-05 PROCEDURE — 84484 ASSAY OF TROPONIN QUANT: CPT | Performed by: EMERGENCY MEDICINE

## 2024-02-05 PROCEDURE — 93005 ELECTROCARDIOGRAM TRACING: CPT

## 2024-02-05 PROCEDURE — 99285 EMERGENCY DEPT VISIT HI MDM: CPT | Mod: 25

## 2024-02-05 PROCEDURE — 99900035 HC TECH TIME PER 15 MIN (STAT)

## 2024-02-05 PROCEDURE — 96375 TX/PRO/DX INJ NEW DRUG ADDON: CPT

## 2024-02-05 PROCEDURE — 83690 ASSAY OF LIPASE: CPT | Performed by: EMERGENCY MEDICINE

## 2024-02-05 PROCEDURE — 87088 URINE BACTERIA CULTURE: CPT | Performed by: EMERGENCY MEDICINE

## 2024-02-05 PROCEDURE — 83605 ASSAY OF LACTIC ACID: CPT

## 2024-02-05 RX ORDER — LOPERAMIDE HYDROCHLORIDE 2 MG/1
2 CAPSULE ORAL 4 TIMES DAILY PRN
Qty: 10 CAPSULE | Refills: 0 | Status: SHIPPED | OUTPATIENT
Start: 2024-02-05

## 2024-02-05 RX ORDER — OXYCODONE HYDROCHLORIDE 5 MG/1
5 TABLET ORAL
Status: COMPLETED | OUTPATIENT
Start: 2024-02-05 | End: 2024-02-05

## 2024-02-05 RX ORDER — CEPHALEXIN 500 MG/1
500 CAPSULE ORAL EVERY 12 HOURS
Qty: 14 CAPSULE | Refills: 0 | Status: SHIPPED | OUTPATIENT
Start: 2024-02-05 | End: 2024-04-17

## 2024-02-05 RX ORDER — MORPHINE SULFATE 4 MG/ML
4 INJECTION, SOLUTION INTRAMUSCULAR; INTRAVENOUS
Status: COMPLETED | OUTPATIENT
Start: 2024-02-05 | End: 2024-02-05

## 2024-02-05 RX ORDER — CEPHALEXIN 500 MG/1
500 CAPSULE ORAL
Status: COMPLETED | OUTPATIENT
Start: 2024-02-05 | End: 2024-02-05

## 2024-02-05 RX ORDER — ONDANSETRON HYDROCHLORIDE 2 MG/ML
4 INJECTION, SOLUTION INTRAVENOUS
Status: COMPLETED | OUTPATIENT
Start: 2024-02-05 | End: 2024-02-05

## 2024-02-05 RX ORDER — OXYCODONE HYDROCHLORIDE 5 MG/1
5 TABLET ORAL EVERY 4 HOURS PRN
Qty: 7 TABLET | Refills: 0 | Status: SHIPPED | OUTPATIENT
Start: 2024-02-05 | End: 2024-05-13

## 2024-02-05 RX ADMIN — MORPHINE SULFATE 4 MG: 4 INJECTION INTRAVENOUS at 03:02

## 2024-02-05 RX ADMIN — CEPHALEXIN 500 MG: 500 CAPSULE ORAL at 07:02

## 2024-02-05 RX ADMIN — SODIUM CHLORIDE 1000 ML: 9 INJECTION, SOLUTION INTRAVENOUS at 03:02

## 2024-02-05 RX ADMIN — ONDANSETRON 4 MG: 2 INJECTION INTRAMUSCULAR; INTRAVENOUS at 03:02

## 2024-02-05 RX ADMIN — IOHEXOL 75 ML: 350 INJECTION, SOLUTION INTRAVENOUS at 07:02

## 2024-02-05 RX ADMIN — OXYCODONE 5 MG: 5 TABLET ORAL at 10:02

## 2024-02-05 NOTE — FIRST PROVIDER EVALUATION
"Medical screening examination initiated.  I have conducted a focused provider triage encounter, findings are as follows:    Brief history of present illness:  diffuse abdominal pain, h/o bowel obstructions    Vitals:    02/05/24 1331   BP: 122/71   Pulse: 102   Resp: 20   TempSrc: Oral   SpO2: 96%   Weight: 61.2 kg (135 lb)   Height: 5' 2" (1.575 m)       Pertinent physical exam:  ambulatory    Brief workup plan:  labs, ct    Preliminary workup initiated; this workup will be continued and followed by the physician or advanced practice provider that is assigned to the patient when roomed.  "

## 2024-02-05 NOTE — ED NOTES
I-STAT Chem-8+ Results:   Value Reference Range   Sodium 138 136-145 mmol/L   Potassium  4.4 3.5-5.1 mmol/L   Chloride 102  mmol/L   Ionized Calcium 1.20 1.06-1.42 mmol/L   CO2 (measured) 24 23-29 mmol/L   Glucose 139  mg/dL   BUN 14 6-30 mg/dL   Creatinine 0.9 0.5-1.4 mg/dL   Hematocrit 33 36-54%

## 2024-02-05 NOTE — PROVIDER PROGRESS NOTES - EMERGENCY DEPT.
Encounter Date: 2/5/2024    ED Physician Progress Notes          Physician Attestation Statement for Resident:  As the supervising MD   Physician Attestation Statement: I have personally seen and examined this patient.       I agree with the history unless otherwise noted.     As the supervising MD I agree with the PE unless otherwise noted.      I have reviewed and agree with the residents interpretation of the following unless otherwise noted:   I have personally reviewed and interpreted the patients laboratory studies: pending at the time of s/o  I have personally reviewed and interpreted imaging studies: pending at the time of s/o  I have personally reviewed and interpreted the patient's EKG:  Normal sinus rhythm without ischemic ST/T-wave changes        As the supervising MD I agree with the treatment, course, plan, and disposition unless otherwise noted.

## 2024-02-05 NOTE — ED TRIAGE NOTES
Pt presents to ED with c/o abd pain, nausea/vomiting, and diarrhea x several months worsening night. Pt endorses dark stool. Pt has hx of bowel cancer and blockages.

## 2024-02-05 NOTE — PROVIDER PROGRESS NOTES - EMERGENCY DEPT.
Encounter Date: 2/5/2024    ED Physician Progress Notes          Physician Note:   Signout Note  I received signout from the previous providers.      Chief complaint:  Nausea, abdominal pain and diarrhea for a few months.     Per sign out and chart review:  Anayeli Judd is a 79 y.o. female , presenting with complaints of nausea, abdominal pain and diarrhea for the past few months.  She has a long history repeated episodes of SBO as well as prior abdominal surgeries, and has been told because of her adhesions that she has not a surgical candidate.  She decided that she needed to come into the emergency department today because the abdominal pain and repeated bouts of diarrhea were becoming unbearable.     Pt signed out to me pending:  The entirety of her laboratory workup as well as an x-ray/CT of her abdomen     Update/ Disposition:  Patient's UA significant for a urinary tract infection for which I gave a dose of Keflex.  The patient's CT abdomen was significant for a stricture in the distal portion of her small bowel.  General surgery was contacted for regards to whether or not this would be something that would require surgical intervention, however after speaking with them they did not feel as if this was the case and recommended the patient follow up with GI in clinic.  The patient remained hemodynamically stable with an overall reassuring exam while here in the emergency department.  I gave her a prescription for loperamide to help with her diarrhea, Keflex to treat her urinary tract infection, and Percocet to help treat her continued back pain which she consistently states bothers her.  Stable for discharge at this time     Patient, caregiver and/or family understands the plan and verbalized agreement. All questions answered.      Diagnostic Impression:    Small-bowel stricture

## 2024-02-05 NOTE — ED NOTES
Assumed care of the patient. Pt placed on continuous cardiac monitoring, continuous pulse oximetry, and automatic BP cuff cycling Q30min. Pt in hospital gown, side rails up X2, bed low and locked, and call light is placed within reach. Sitter at bedside at this time. Pt denies any complaints or needs.

## 2024-02-06 ENCOUNTER — OUTPATIENT CASE MANAGEMENT (OUTPATIENT)
Dept: ADMINISTRATIVE | Facility: OTHER | Age: 79
End: 2024-02-06
Payer: MEDICARE

## 2024-02-06 ENCOUNTER — PATIENT MESSAGE (OUTPATIENT)
Dept: INTERNAL MEDICINE | Facility: CLINIC | Age: 79
End: 2024-02-06
Payer: MEDICARE

## 2024-02-06 VITALS
SYSTOLIC BLOOD PRESSURE: 117 MMHG | TEMPERATURE: 98 F | HEART RATE: 62 BPM | BODY MASS INDEX: 24.84 KG/M2 | DIASTOLIC BLOOD PRESSURE: 57 MMHG | OXYGEN SATURATION: 96 % | HEIGHT: 62 IN | WEIGHT: 135 LBS | RESPIRATION RATE: 16 BRPM

## 2024-02-06 LAB
BUN SERPL-MCNC: 14 MG/DL (ref 6–30)
CHLORIDE SERPL-SCNC: 102 MMOL/L (ref 95–110)
CREAT SERPL-MCNC: 0.9 MG/DL (ref 0.5–1.4)
GLUCOSE SERPL-MCNC: 139 MG/DL (ref 70–110)
HCT VFR BLD CALC: 33 %PCV (ref 36–54)
POC IONIZED CALCIUM: 1.2 MMOL/L (ref 1.06–1.42)
POC TCO2 (MEASURED): 24 MMOL/L (ref 23–29)
POTASSIUM BLD-SCNC: 4.4 MMOL/L (ref 3.5–5.1)
SAMPLE: ABNORMAL
SODIUM BLD-SCNC: 138 MMOL/L (ref 136–145)

## 2024-02-06 NOTE — PROGRESS NOTES
Outpatient Care Management  Plan of Care Follow Up Visit    Patient: Anayeli Judd  MRN: [id number]  Date of Service: 02/06/2024  Completed by: Elizabeth Lea RN  Referral Date: 01/22/2024    Reason for Visit   Patient presents with    OPCM Chart Review    OPCM RN Follow Up Call       Brief Summary: OPCM visit completed, care plan reviewed and updated.   Next Steps: plan to follow up in approximately one week - patient agrees. How did appointments go? Discuss diet, fall prevention.  RIMMA Lea RN

## 2024-02-06 NOTE — CONSULTS
"General Surgery    Reason for Consult: "Stricture"    History of Present Illness:  79 y.o. female with hx of multiple prior abdominal surgeries and previous admission for SBO managed conservatively. She presented to the ED today due to concerns for possible UTI and chronic diarrhea. She reported her symptoms are not like when she's obstructed. CT scan showed distension at the prior small bowel anastomoses (similar to numerous prev CT scans) and commented that this could represented some degree of stricture however there was no other evidence to suggest an obstruction. Patient is AF/HDS with normal labs. General surgery consulted for "stricture".     Past Medical History:   Diagnosis Date    Abdominal pain     Allergic rhinitis     Arthritis     Blood platelet disorder     Blood platelet disorder     Blood transfusion     during delivery and     Bowel obstruction     Cervical radiculopathy     followed by dr cloud    Colon cancer     transverse colon; resected; Stage IIA (pT3 pN0 MX)    Degenerative joint disease (DJD) of lumbar spine     Diabetes mellitus     Diarrhea     Falls 2020    Family history of breast cancer     Family history of colon cancer     Fatty liver     GERD (gastroesophageal reflux disease)     History of shingles     Hyperlipidemia     Hypomagnesemia     Hypothyroidism     Irritable bowel syndrome     Microscopic colitis     treated     Migraine     Myelodysplastic syndrome     Raynaud phenomenon     Raynaud's disease     Squamous cell carcinoma of skin     Type 2 diabetes mellitus     Usual hyperplasia of lactiferous duct 1970      Past Surgical History:   Procedure Laterality Date    ADENOIDECTOMY      APPENDECTOMY      BACK SURGERY      BREAST BIOPSY  1970    BREAST CYST EXCISION  1970?    BREAST LUMPECTOMY  1970    CARPAL TUNNEL RELEASE      bilateral      SECTION      CHOLECYSTECTOMY  1965    COLECTOMY  2011    Transverse colon resection by Dr. Aguirre    " COLONOSCOPY N/A 07/27/2017    Procedure: COLONOSCOPY;  Surgeon: Manjit Alvarez MD;  Location: [facility] (4TH FLR);  Service: Endoscopy;  Laterality: N/A;    COLONOSCOPY N/A 06/26/2019    Procedure: COLONOSCOPY;  Surgeon: Ramiro Jefferson MD;  Location: [facility] (4TH FLR);  Service: Endoscopy;  Laterality: N/A;  PM prep    COLONOSCOPY N/A 05/04/2022    Procedure: COLONOSCOPY;  Surgeon: Ramiro Jefferson MD;  Location: [facility] (2ND FLR);  Service: Endoscopy;  Laterality: N/A;  EGD and colonoscopy in 6-8 weeks from now     states has constipation. -extended Golytely prep    CYSTOSCOPY N/A 11/09/2021    Procedure: CYSTOSCOPY;  Surgeon: Viridiana Valenzuela MD;  Location: [facility] OR 1ST FLR;  Service: Urology;  Laterality: N/A;    ENDOSCOPIC ULTRASOUND OF UPPER GASTROINTESTINAL TRACT N/A 12/12/2018    Procedure: ULTRASOUND, UPPER GI TRACT, ENDOSCOPIC;  Surgeon: Jose Hess MD;  Location: [facility];  Service: Endoscopy;  Laterality: N/A;    ENDOSCOPIC ULTRASOUND OF UPPER GASTROINTESTINAL TRACT N/A 01/23/2019    Procedure: ULTRASOUND, UPPER GI TRACT, ENDOSCOPIC;  Surgeon: Jose Hess MD;  Location: [facility] (2ND FLR);  Service: Endoscopy;  Laterality: N/A;    ENDOSCOPIC ULTRASOUND OF UPPER GASTROINTESTINAL TRACT N/A 10/25/2023    Procedure: ULTRASOUND, UPPER GI TRACT, ENDOSCOPIC;  Surgeon: Jose Campos MD;  Location: [facility];  Service: Endoscopy;  Laterality: N/A;  10/20/23: instructions sent via portal-GD    ESOPHAGOGASTRODUODENOSCOPY N/A 11/16/2018    Procedure: EGD (ESOPHAGOGASTRODUODENOSCOPY);  Surgeon: Angelo Reynolds MD;  Location: [facility] (2ND FLR);  Service: Endoscopy;  Laterality: N/A;    ESOPHAGOGASTRODUODENOSCOPY N/A 06/26/2019    Procedure: EGD (ESOPHAGOGASTRODUODENOSCOPY);  Surgeon: Ramiro Jefferson MD;  Location: [facility] (4TH FLR);  Service: Endoscopy;  Laterality: N/A;    ESOPHAGOGASTRODUODENOSCOPY N/A 05/04/2022    Procedure: EGD (ESOPHAGOGASTRODUODENOSCOPY);  Surgeon: Ramiro POWERS  MD Li;  Location: [facility] ENDO (2ND FLR);  Service: Endoscopy;  Laterality: N/A;  fully vaccinated-GT    ESOPHAGOGASTRODUODENOSCOPY N/A 10/25/2023    Procedure: EGD (ESOPHAGOGASTRODUODENOSCOPY);  Surgeon: Jose Campos MD;  Location: [facility] ENDO;  Service: Endoscopy;  Laterality: N/A;    ESOPHAGOGASTRODUODENOSCOPY N/A 11/15/2023    Procedure: ESOPHAGOGASTRODUODENOSCOPY (EGD);  Surgeon: Db Sanchez MD;  Location: [facility] ENDO (4TH FLR);  Service: Endoscopy;  Laterality: N/A;  New diagnosis of esophageal varices. Not a candidate for beta blocker due to soft blood pressures. Recommend EV banding.  PT/INR done on 11/3/23 and CBC done on 9/30/23  ok per Dr. Espinoza to be done by 1st available provider-see tele encounter-st  1    EYE SURGERY      Cataract Removal    FLUOROSCOPIC URODYNAMIC STUDY N/A 11/09/2021    Procedure: URODYNAMIC STUDY, FLUOROSCOPIC;  Surgeon: Viridiana Valenzuela MD;  Location: [facility] OR 1ST FLR;  Service: Urology;  Laterality: N/A;  90 minutes     HYSTERECTOMY      JOINT REPLACEMENT      OOPHORECTOMY  1970    posterolateral fusion with autograft bone and Eun mineralized bone matrix  02/01/2013    at [facility] for lumbar spine stenosis    SMALL INTESTINE SURGERY      SPINE SURGERY      TOE SURGERY      TONSILLECTOMY      TRANSFORAMINAL EPIDURAL INJECTION OF STEROID Bilateral 12/21/2022    Procedure: Injection,steroid,epidural, Todd L5/S1 TF CONTRAST DIRECT REFERRAL;  Surgeon: Toni Osorio MD;  Location: [facility] PAIN MGT;  Service: Pain Management;  Laterality: Bilateral;    TRIGGER FINGER RELEASE        Review of Systems   Gastrointestinal:  Positive for abdominal pain and diarrhea.   Genitourinary:         Dark urine        Vital Signs (Most Recent)  Temp: 98.2 °F (36.8 °C) (02/05/24 2100)  Pulse: 64 (02/05/24 2100)  Resp: 19 (02/05/24 2229)  BP: 97/63 (02/05/24 2100)  SpO2: 99 % (02/05/24 2100)    Physical Exam   Constitutional: normal appearance.   Cardiovascular: Normal rate and regular rhythm.  Pulmonary:      Effort: Pulmonary effort is normal. No respiratory distress.     Abdominal: Soft. Normal appearance. She exhibits no distension and no mass. There is no abdominal tenderness. No hernia.   Neurological: She is alert.   Nursing note and vitals reviewed.       Labs:  I have reviewed all pertinent lab results within the past 24 hours.  CBC:   Recent Labs   Lab 02/05/24  1512   WBC 6.60   RBC 3.44*   HGB 11.6*   HCT 34.6*   PLT 75*   *   MCH 33.7*   MCHC 33.5     CMP:   Recent Labs   Lab 02/05/24  1512   *   CALCIUM 9.4   ALBUMIN 3.5   PROT 7.0      K 4.5   CO2 21*      BUN 14   CREATININE 0.9   ALKPHOS 64   ALT 17   AST 29   BILITOT 0.8       Imaging:  I have reviewed all pertinent imaging results/findings within the past 24 hours.    Assessment and Plan:  79 y.o. female presenting to the ED with concerns for UTI and chronic diarrhea    Patient does not have an acute surgical issue  No indication for admission for surgical standpoint  Dispo per ED regarding diarrhea and UTI    Amanda Santana MD  General Surgery PGY5

## 2024-02-06 NOTE — DISCHARGE INSTRUCTIONS
You were found to have a narrowing in your small intestine here in the emergency department.  You were given a prescription to follow up with the Gastroenterology doctors.  You should be receiving a phone call from them, but you have been provided with the phone number to call if you do not hear from them in the next day or so.  You have also been given a prescription for Keflex to take twice a day for the next 7 days, once in the morning and once in the evening for your urinary tract infection.  Please return to the emergency department should you experience new onset or worsening symptoms such as worsening diarrhea, a retractable abdominal pain or any other alarming symptoms.

## 2024-02-07 ENCOUNTER — PATIENT MESSAGE (OUTPATIENT)
Dept: RESEARCH | Facility: HOSPITAL | Age: 79
End: 2024-02-07
Payer: MEDICARE

## 2024-02-07 ENCOUNTER — TELEPHONE (OUTPATIENT)
Dept: HEPATOLOGY | Facility: CLINIC | Age: 79
End: 2024-02-07
Payer: MEDICARE

## 2024-02-07 ENCOUNTER — PATIENT MESSAGE (OUTPATIENT)
Dept: HEPATOLOGY | Facility: CLINIC | Age: 79
End: 2024-02-07
Payer: MEDICARE

## 2024-02-07 LAB — BACTERIA UR CULT: ABNORMAL

## 2024-02-07 NOTE — TELEPHONE ENCOUNTER
"----- Message from Darci Jarqiun sent at 2/7/2024 12:56 PM CST -----  Consult/Advisory:      Name Of Caller: Self      Contact Preference?:  [phone] (home)       What is the nature of the call?: Returning call to office about r/s today's appt      Additional Notes:  "Thank you for all that you do for our patients"       Pt was called and appt Rs  "

## 2024-02-12 ENCOUNTER — OFFICE VISIT (OUTPATIENT)
Dept: GASTROENTEROLOGY | Facility: CLINIC | Age: 79
End: 2024-02-12
Payer: MEDICARE

## 2024-02-12 ENCOUNTER — PATIENT MESSAGE (OUTPATIENT)
Dept: GASTROENTEROLOGY | Facility: CLINIC | Age: 79
End: 2024-02-12

## 2024-02-12 VITALS
BODY MASS INDEX: 26.17 KG/M2 | DIASTOLIC BLOOD PRESSURE: 68 MMHG | HEIGHT: 62 IN | WEIGHT: 142.19 LBS | SYSTOLIC BLOOD PRESSURE: 125 MMHG | HEART RATE: 68 BPM

## 2024-02-12 DIAGNOSIS — Z87.19 HISTORY OF SMALL BOWEL OBSTRUCTION: ICD-10-CM

## 2024-02-12 DIAGNOSIS — R15.9 INCONTINENCE OF FECES WITH FECAL URGENCY: ICD-10-CM

## 2024-02-12 DIAGNOSIS — R19.7 DIARRHEA, UNSPECIFIED TYPE: Primary | ICD-10-CM

## 2024-02-12 DIAGNOSIS — R15.2 INCONTINENCE OF FECES WITH FECAL URGENCY: ICD-10-CM

## 2024-02-12 PROCEDURE — 99214 OFFICE O/P EST MOD 30 MIN: CPT | Mod: S$PBB,,, | Performed by: STUDENT IN AN ORGANIZED HEALTH CARE EDUCATION/TRAINING PROGRAM

## 2024-02-12 PROCEDURE — 99999 PR PBB SHADOW E&M-EST. PATIENT-LVL V: CPT | Mod: PBBFAC,,, | Performed by: STUDENT IN AN ORGANIZED HEALTH CARE EDUCATION/TRAINING PROGRAM

## 2024-02-12 PROCEDURE — 99215 OFFICE O/P EST HI 40 MIN: CPT | Mod: PBBFAC | Performed by: STUDENT IN AN ORGANIZED HEALTH CARE EDUCATION/TRAINING PROGRAM

## 2024-02-12 RX ORDER — AMOXICILLIN 500 MG/1
500 TABLET, FILM COATED ORAL EVERY 12 HOURS
COMMUNITY

## 2024-02-12 RX ORDER — CHOLESTYRAMINE 4 G/9G
4 POWDER, FOR SUSPENSION ORAL
Qty: 270 PACKET | Refills: 3 | Status: SHIPPED | OUTPATIENT
Start: 2024-02-12 | End: 2025-02-11

## 2024-02-12 NOTE — PROGRESS NOTES
[facility] NOTE    Name: Anayeli Judd  : 1945  Date of Service: 2024   PCP: Darci Guthrie MD    Reason for visit:   Chief Complaint   Patient presents with    Follow-up     Hospital follow up and discuss problems with esophagus    Diarrhea       HPI:     The patient is an 80 y/o F s/p transverse colon cancer Stage 2A treated with an open transverse colectomy with mobilization or hepatic/splenic flexures as well as the sigmoid colon in  by Dr. Aguirre. She subsequently developed small bowel obstructions in  and, most recently, in 2017 which required exploratory laparotomies with extensive lysis of adhesions. Her surgery in April involved small bowel repair x 14 and placement of a gastrostomy tube. 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 Since 3/2022, she has been lost to follow up with GI, but has been to the ED with multiple episodes of SBO. She notes that oral contrast administration will almost always resolve her SBO. She currently denies any obstructive symptoms, noting she is moving her bowels 5-7 times per day and spontaneously passing gas. She notes that her main concern at this time is fecal incontinence and diarrhea. She is on a balance of PEG and metamucil and was told previously that having looser stools was in her best interest as any obstructions could be complicated given her anatomy and that she would not be a surgical candidate moving forward. She states that she has diminished QOL given she feels diarrhea is cumbersome with strong sense of urgency and need for evacuation taking precedence as she will otherwise soil herself. She now has been in [place] for some time out of precaution. She finds this tremendously embarrassing. She has started to  avoid outings and events for this reason. Has had pelvic floor PT previously and done well with that. She is not sure when this therapy was completed.     Most Recent Colonoscopy:   5/2022- The examined portion of the ileum was normal. Diverticulosis in the recto-sigmoid colon, in the sigmoid colon, in the descending colon and in the transverse colon. One 2 mm polyp in the descending colon, removed with a cold snare. Resected and retrieved. Non-bleeding internal hemorrhoids.     Review of Systems:   Review of Systems   Constitutional:  Negative for chills and fever.   HENT:  Negative for congestion and sore throat.    Respiratory:  Negative for cough and shortness of breath.    Cardiovascular:  Negative for chest pain and palpitations.   Gastrointestinal:  Positive for abdominal pain and diarrhea. Negative for blood in stool, constipation, nausea and vomiting.   Genitourinary:  Negative for dysuria and frequency.   Musculoskeletal:  Negative for back pain and myalgias.   Skin:  Negative for itching and rash.   Neurological:  Negative for dizziness and headaches.       Medications:   Current Outpatient Medications:     acetaminophen (TYLENOL) 650 MG TbSR, Take 1,300 mg by mouth 2 (two) times a day., Disp: , Rfl:     amoxicillin (AMOXIL) 500 MG Tab, Take 500 mg by mouth every 12 (twelve) hours., Disp: , Rfl:     ascorbic acid, vitamin C, (VITAMIN C) 1000 MG tablet, Take 1,000 mg by mouth once daily., Disp: , Rfl:     biotin 10,000 mcg TbDL, Take 1 tablet by mouth once daily. , Disp: , Rfl:     blood sugar diagnostic (FREESTYLE LITE STRIPS) Strp, 1 strip by Misc.(Non-Drug; Combo Route) route 3 (three) times daily., Disp: 200 strip, Rfl: 5    blood-glucose meter kit, Use as instructed, Disp: 1 each, Rfl: 0    calcium-vitamin D3 (OS-KASSANDRA 500 + D3) 500 mg-5 mcg (200 unit) per tablet, Take 1 tablet by mouth once daily., Disp: , Rfl:     cephALEXin (KEFLEX) 500 MG capsule, Take 1 capsule (500 mg total) by mouth every 12  (twelve) hours., Disp: 14 capsule, Rfl: 0    conjugated estrogens (PREMARIN) vaginal cream, INSERT 0.5 GRAM VAGINALLY 2 TIMES A WEEK, Disp: 30 g, Rfl: 3    cranberry extract 200 mg Cap, Take 1 capsule by mouth once daily., Disp: , Rfl:     diphenoxylate-atropine 2.5-0.025 mg (LOMOTIL) 2.5-0.025 mg per tablet, Take 1 tablet by mouth 3 (three) times daily as needed for Diarrhea., Disp: 30 tablet, Rfl: 0    donepeziL (ARICEPT) 10 MG tablet, TAKE 1 TABLET(10 MG) BY MOUTH EVERY EVENING, Disp: 90 tablet, Rfl: 3    DULoxetine (CYMBALTA) 30 MG capsule, TAKE 1 CAPSULE BY MOUTH TWICE  DAILY, Disp: 180 capsule, Rfl: 3    ferrous sulfate 324 mg (65 mg iron) TbEC, Take 1 tablet (324 mg total) by mouth once daily., Disp: , Rfl: 0    flash glucose scanning reader (FREESTYLE EFREN 14 DAY READER) Misc, Check blood glucose level 3 times daily., Disp: 1 each, Rfl: 0    flash glucose sensor (FREESTYLE EFREN 14 DAY SENSOR) Kit, 1 application  by Misc.(Non-Drug; Combo Route) route every 14 (fourteen) days. Disp 30 or 90 day refill, Disp: 6 kit, Rfl: 3    gabapentin (NEURONTIN) 300 MG capsule, Take 1 to 2 capsules 3 times daily if needed for leg pain., Disp: 180 capsule, Rfl: 5    hydrocortisone 2.5 % cream, Apply topically 2 (two) times daily as needed., Disp: 1 each, Rfl: 1    lancets (FREESTYLE LANCETS) 28 gauge Misc, 30 lancets by Misc.(Non-Drug; Combo Route) route Daily., Disp: 30 each, Rfl: 3    levothyroxine (SYNTHROID) 75 MCG tablet, TAKE 1 TABLET BY MOUTH BEFORE  BREAKFAST, Disp: 90 tablet, Rfl: 3    LIDOcaine (LIDODERM) 5 %, APPLY 1 TO 3 PATCHES TO BACK DAILY. REMOVE WITHIN 12 HOURS, Disp: 30 patch, Rfl: 2    loperamide (IMODIUM) 2 mg capsule, Take 1 capsule (2 mg total) by mouth 2 (two) times daily as needed for Diarrhea., Disp: 30 capsule, Rfl: 1    magnesium 30 mg Tab, Take 30 mg by mouth once. Patient does not know actual dose in MG, Disp: , Rfl:     metFORMIN (GLUCOPHAGE) 500 MG tablet, Take 1 tablet (500 mg total) by mouth  2 (two) times daily with meals., Disp: 180 tablet, Rfl: 3    mometasone (ELOCON) 0.1 % ointment, Apply topically 2 (two) times a day. Mix 50/50 with mupirocin ointment. Apply in each nostril twice daily., Disp: 15 g, Rfl: 1    montelukast (SINGULAIR) 10 mg tablet, TAKE 1 TABLET BY MOUTH  DAILY, Disp: 90 tablet, Rfl: 3    ondansetron (ZOFRAN-ODT) 8 MG TbDL, Take 1 tablet (8 mg total) by mouth every 8 (eight) hours as needed (nausea)., Disp: 30 tablet, Rfl: 0    pantoprazole (PROTONIX) 20 MG tablet, TAKE 1 TABLET(20 MG) BY MOUTH TWICE DAILY, Disp: 180 tablet, Rfl: 1    polyethylene glycol (GLYCOLAX) 17 gram PwPk, Take 17 g by mouth once daily., Disp: 90 each, Rfl: 3    traZODone (DESYREL) 50 MG tablet, TAKE 1 TABLET(50 MG) BY MOUTH EVERY EVENING, Disp: 90 tablet, Rfl: 1    ubrogepant (UBRELVY) 100 mg tablet, Take 1 tablet daily as needed for migraine headache. May take 1 additional tablet 2 hours later if needed., Disp: 16 tablet, Rfl: 2    valACYclovir (VALTREX) 1000 MG tablet, TAKE 1 TABLET(1000 MG) BY MOUTH EVERY DAY, Disp: 30 tablet, Rfl: 2    atorvastatin (LIPITOR) 40 MG tablet, Take 40 mg by mouth once daily., Disp: , Rfl:     cholestyramine (QUESTRAN) 4 gram packet, Take 1 packet (4 g total) by mouth 3 (three) times daily with meals., Disp: 270 packet, Rfl: 3    HYDROcodone-acetaminophen (NORCO) 5-325 mg per tablet, Take 1 tablet by mouth every 6 (six) hours as needed for Pain. (Patient not taking: Reported on 1/30/2024), Disp: 28 tablet, Rfl: 0    levothyroxine (SYNTHROID) 75 MCG tablet, TAKE 1 TABLET(75 MCG) BY MOUTH BEFORE BREAKFAST, Disp: 90 tablet, Rfl: 3    linaGLIPtin (TRADJENTA) 5 mg Tab tablet, Take 1 tablet (5 mg total) by mouth once daily., Disp: 90 tablet, Rfl: 3    loperamide (IMODIUM) 2 mg capsule, Take 1 capsule (2 mg total) by mouth 4 (four) times daily as needed for Diarrhea., Disp: 10 capsule, Rfl: 0    oxyCODONE (ROXICODONE) 5 MG immediate release tablet, Take 1 tablet (5 mg total) by mouth  "every 4 (four) hours as needed for Pain., Disp: 7 tablet, Rfl: 0     Past medical history, past surgical history, family history, allergies, and social history reviewed and updated in EMR.     Vitals:   Vitals:    02/12/24 1246   BP: 125/68   Pulse: 68   Weight: 64.5 kg (142 lb 3.2 oz)   Height: 5' 2" (1.575 m)     Body mass index is 26.01 kg/m².    Physical Exam:   Physical Exam  Vitals reviewed.   Constitutional:       General: She is not in acute distress.     Appearance: She is not toxic-appearing.   HENT:      Head: Normocephalic and atraumatic.      Nose: Nose normal.      Mouth/Throat:      Mouth: Mucous membranes are moist.   Eyes:      General: No scleral icterus.     Extraocular Movements: Extraocular movements intact.   Cardiovascular:      Rate and Rhythm: Normal rate.      Pulses: Normal pulses.   Pulmonary:      Effort: Pulmonary effort is normal. No respiratory distress.   Abdominal:      General: Abdomen is flat.      Palpations: Abdomen is soft.      Tenderness: There is no abdominal tenderness. There is no guarding.   Skin:     General: Skin is warm and dry.      Coloration: Skin is not jaundiced.   Neurological:      Mental Status: She is alert and oriented to person, place, and time. Mental status is at baseline.   Psychiatric:         Mood and Affect: Mood normal.         Behavior: Behavior normal.         Assessment:   1. Diarrhea, unspecified type  Ambulatory referral/consult to Gastroenterology    Ambulatory referral/consult to Physical/Occupational Therapy      2. Incontinence of feces with fecal urgency  Ambulatory referral/consult to Gastroenterology      3. History of small bowel obstruction            Plan:   - Given rectal incontinence with urgency and prior history of pelvic PT with symptomatic improvement, will refer patient back to outpatient Pelvic PT to see if pelvic floor strengthening can improve her quality of life  - Avoid narcotics, anti-diarrheals and bentyl- would like to " avoid medicines that could potentially precipitate an obstruction  - Will trial cholestyramine with meals to see if this can help her diarrhea as she reports near immediate need to defecate following food consumption   - Continue fiber supplementation as previously prescribed   - Follow up with me in 6 months or sooner if needed     Discussed with staff attending. Attestation to follow.     Jayshree Vela DO PGY- 4  Gastroenterology Fellow  [facility]

## 2024-02-12 NOTE — PROGRESS NOTES
"GENERAL GI PATIENT INTAKE:    COVID symptoms in the last 7 days (runny nose, sore throat, congestion, cough, fever): No  PCP: Darci Guthrie  If not PCP-  number given to establish [phone]: N/A    ALLERGIES REVIEWED:  Yes    CHIEF COMPLAINT:    Chief Complaint   Patient presents with    Follow-up     Hospital follow up and discuss problems with esophagus    Diarrhea       VITAL SIGNS:  /68   Pulse 68   Ht 5' 2" (1.575 m)   Wt 64.5 kg (142 lb 3.2 oz)   LMP  (LMP Unknown)   BMI 26.01 kg/m²      Change in medical, surgical, family or social history: No      REVIEWED MEDICATION LIST RECONCILED INCLUDING ABOVE MEDS:  Yes      "

## 2024-02-13 DIAGNOSIS — E11.69 TYPE 2 DIABETES MELLITUS WITH OTHER SPECIFIED COMPLICATION, WITHOUT LONG-TERM CURRENT USE OF INSULIN: ICD-10-CM

## 2024-02-13 NOTE — TELEPHONE ENCOUNTER
No care due was identified.  [facility] Embedded Care Due Messages. Reference number: [id number].   2/13/2024 9:08:49 AM CST

## 2024-02-14 NOTE — TELEPHONE ENCOUNTER
Called and advised the patient to elevate and wear compression stockings . I also asked her to call back tomorrow if her legs get worse or do not improve . Patient verbalized understanding.

## 2024-02-14 NOTE — TELEPHONE ENCOUNTER
----- Message from Darci Guthrie MD sent at 2/14/2024  3:51 PM CST -----  Regarding: RE: swollen legs  If not having pain or redness, go ahead and advise leg elevation and stocking use. Can see me Friday if the swelling persists since I will be working from home tomorrow.    Thanks,  Myles  ----- Message -----  From: Sarahy Clark LPN  Sent: 2/14/2024   3:43 PM CST  To: Darci Guthrie MD  Subject: swollen legs                                     Patient states on Monday her right leg close to her ankle is very swollen. He left leg is not as bad. Denies any other symptoms. She states she does not feel bad but also not great. She would like to speak to you . [phone]

## 2024-02-15 RX ORDER — METFORMIN HYDROCHLORIDE 500 MG/1
500 TABLET ORAL 2 TIMES DAILY WITH MEALS
Qty: 180 TABLET | Refills: 1 | Status: SHIPPED | OUTPATIENT
Start: 2024-02-15 | End: 2024-06-05

## 2024-02-15 NOTE — TELEPHONE ENCOUNTER
Refill Routing Note   Medication(s) are not appropriate for processing by [facility] for the following reason(s):        ED/Hospital Visit since last OV with provider    [facility] action(s):  Defer               Appointments  past 12m or future 3m with PCP    Date Provider   Last Visit   6/6/2023 Darci Guthrie MD   Next Visit   Visit date not found Darci Guthrie MD   ED visits in past 90 days: 1        Note composed:7:03 PM 02/14/2024

## 2024-02-16 ENCOUNTER — OUTPATIENT CASE MANAGEMENT (OUTPATIENT)
Dept: ADMINISTRATIVE | Facility: OTHER | Age: 79
End: 2024-02-16
Payer: MEDICARE

## 2024-02-20 ENCOUNTER — OUTPATIENT CASE MANAGEMENT (OUTPATIENT)
Dept: ADMINISTRATIVE | Facility: OTHER | Age: 79
End: 2024-02-20
Payer: MEDICARE

## 2024-02-20 NOTE — PROGRESS NOTES
Outpatient Care Management  Plan of Care Follow Up Visit    Patient: Anayeli Judd  MRN: [id number]  Date of Service: 02/20/2024  Completed by: Elizabeth Lea RN  Referral Date: 01/22/2024    Reason for Visit   Patient presents with    OPCM Chart Review    OPCM RN Follow Up Call       Brief Summary: OPCM visit completed, care plan reviewed and updated.   Next Steps: plan to follow up in approximately 2 weeks - patient agrees. Review fall prevention, discuss bowel issues and diet.  RIMMA Lea RN

## 2024-02-21 ENCOUNTER — DOCUMENT SCAN (OUTPATIENT)
Dept: HOME HEALTH SERVICES | Facility: HOSPITAL | Age: 79
End: 2024-02-21
Payer: MEDICARE

## 2024-02-23 DIAGNOSIS — K21.9 GASTROESOPHAGEAL REFLUX DISEASE, UNSPECIFIED WHETHER ESOPHAGITIS PRESENT: Chronic | ICD-10-CM

## 2024-02-23 RX ORDER — PANTOPRAZOLE SODIUM 20 MG/1
20 TABLET, DELAYED RELEASE ORAL 2 TIMES DAILY
Qty: 180 TABLET | Refills: 1 | Status: SHIPPED | OUTPATIENT
Start: 2024-02-23

## 2024-02-23 NOTE — TELEPHONE ENCOUNTER
Refill Request Routed to the [facility] Review Pool for the pharmacist to review.

## 2024-02-23 NOTE — TELEPHONE ENCOUNTER
No care due was identified.  [facility] Embedded Care Due Messages. Reference number: [id number].   2/23/2024 8:10:32 AM CST

## 2024-02-23 NOTE — TELEPHONE ENCOUNTER
Refill Routing Note   Medication(s) are not appropriate for processing by [facility] for the following reason(s):      Patient seen in ED/Hospital since LOV with provider    [facility] action(s):  Defer Care Due:  None identified          Pharmacist review requested: Yes     Appointments  past 12m or future 3m with PCP    Date Provider   Last Visit   6/6/2023 Darci Guthrie MD   Next Visit   Visit date not found Darci Guthrie MD   ED visits in past 90 days: 1        Note composed:1:31 PM 02/23/2024

## 2024-03-08 ENCOUNTER — OUTPATIENT CASE MANAGEMENT (OUTPATIENT)
Dept: ADMINISTRATIVE | Facility: OTHER | Age: 79
End: 2024-03-08
Payer: MEDICARE

## 2024-03-08 NOTE — PROGRESS NOTES
Outpatient Care Management  Plan of Care Follow Up Visit    Patient: Anayeli Judd  MRN: [id number]  Date of Service: 03/08/2024  Completed by: Elizabeth Lea RN  Referral Date: 01/22/2024    Reason for Visit   Patient presents with    OPCM Chart Review    OPCM RN Follow Up Call       Brief Summary: OPCM visit completed, care plan reviewed and updated.   Next Steps: plan to follow up in approximately 2 weeks - patient agrees. Discuss diet, bowel movements, fall prevention.  RIMMA Lea RN

## 2024-03-13 DIAGNOSIS — E11.69 TYPE 2 DIABETES MELLITUS WITH OTHER SPECIFIED COMPLICATION, WITHOUT LONG-TERM CURRENT USE OF INSULIN: ICD-10-CM

## 2024-03-13 RX ORDER — LINAGLIPTIN 5 MG/1
5 TABLET, FILM COATED ORAL
Qty: 90 TABLET | Refills: 3 | Status: SHIPPED | OUTPATIENT
Start: 2024-03-13

## 2024-03-13 NOTE — TELEPHONE ENCOUNTER
Care Due:                  Date            Visit Type   Department     Provider  --------------------------------------------------------------------------------                                             [facility]   INTERNAL  Last Visit: 06-      PATIENT      MEDICINE       Darci Guthrie  Next Visit: None Scheduled  None         None Found                                                            Last  Test          Frequency    Reason                     Performed    Due Date  --------------------------------------------------------------------------------    Office Visit  12 months..  loperamide, polyethylene.  06- 05-    [facility] Embedded Care Due Messages. Reference number: [id number].   3/13/2024 8:10:10 AM JONNYT

## 2024-03-13 NOTE — TELEPHONE ENCOUNTER
Refill Routing Note   Medication(s) are not appropriate for processing by [facility] for the following reason(s):        ED/Hospital Visit since last OV with provider  No active prescription written by provider    [facility] action(s):  Defer   Requires appointment : Yes               Appointments  past 12m or future 3m with PCP    Date Provider   Last Visit   6/6/2023 Darci Guthrie MD   Next Visit   Visit date not found Darci Guthrie MD   ED visits in past 90 days: 1        Note composed:1:31 PM 03/13/2024

## 2024-03-21 ENCOUNTER — OUTPATIENT CASE MANAGEMENT (OUTPATIENT)
Dept: ADMINISTRATIVE | Facility: OTHER | Age: 79
End: 2024-03-21
Payer: MEDICARE

## 2024-03-21 NOTE — PROGRESS NOTES
Outpatient Care Management  Plan of Care Follow Up Visit    Patient: Anayeli Judd  MRN: [id number]  Date of Service: 03/21/2024  Completed by: Elizabeth Lea RN  Referral Date: 01/22/2024    Reason for Visit   Patient presents with    OPCM Chart Review    OPCM RN Follow Up Call       Brief Summary: OPCM visit completed, care plan reviewed and updated.   Next Steps: plan to follow up in approximately 2 weeks - patient agrees. How is GI system? Review fall prevention. Consider discharge from OPCM.   RIMMA Lea RN

## 2024-03-31 NOTE — TELEPHONE ENCOUNTER
Refill Routing Note   Refill Routing Note   Medication(s) are not appropriate for processing by [facility] for the following reason(s):        ED/Hospital Visit since last OV with provider    [facility] action(s):  Defer             Appointments  past 12m or future 3m with PCP    Date Provider   Last Visit   6/6/2023 Darci Guthrie MD   Next Visit   Visit date not found Darci Guthrie MD   ED visits in past 90 days: 1        Note composed:11:08 AM 03/31/2024

## 2024-03-31 NOTE — TELEPHONE ENCOUNTER
No care due was identified.  [facility] Embedded Care Due Messages. Reference number: [id number].   3/31/2024 11:05:41 AM CDT

## 2024-04-01 RX ORDER — DULOXETIN HYDROCHLORIDE 30 MG/1
CAPSULE, DELAYED RELEASE ORAL
Qty: 180 CAPSULE | Refills: 1 | Status: SHIPPED | OUTPATIENT
Start: 2024-04-01

## 2024-04-04 ENCOUNTER — OUTPATIENT CASE MANAGEMENT (OUTPATIENT)
Dept: ADMINISTRATIVE | Facility: OTHER | Age: 79
End: 2024-04-04
Payer: MEDICARE

## 2024-04-08 ENCOUNTER — EXTERNAL HOME HEALTH (OUTPATIENT)
Dept: HOME HEALTH SERVICES | Facility: HOSPITAL | Age: 79
End: 2024-04-08
Payer: MEDICARE

## 2024-04-09 ENCOUNTER — PATIENT MESSAGE (OUTPATIENT)
Dept: INTERNAL MEDICINE | Facility: CLINIC | Age: 79
End: 2024-04-09
Payer: MEDICARE

## 2024-04-09 DIAGNOSIS — R11.2 NAUSEA AND VOMITING, UNSPECIFIED VOMITING TYPE: Primary | ICD-10-CM

## 2024-04-10 RX ORDER — ONDANSETRON 8 MG/1
8 TABLET, ORALLY DISINTEGRATING ORAL EVERY 8 HOURS PRN
Qty: 30 TABLET | Refills: 2 | Status: SHIPPED | OUTPATIENT
Start: 2024-04-10

## 2024-04-15 ENCOUNTER — TELEPHONE (OUTPATIENT)
Dept: INTERNAL MEDICINE | Facility: CLINIC | Age: 79
End: 2024-04-15
Payer: MEDICARE

## 2024-04-15 DIAGNOSIS — R10.2 PELVIC PAIN: Primary | ICD-10-CM

## 2024-04-15 NOTE — TELEPHONE ENCOUNTER
Called and spoke to patient. I did advise her that she can take her Gabapentin 2 - 3 time daily for her leg pain per Dr. Guthrie, She will drop off a urine tomorrow at the [facility] location for testing. Patient verbalized understanding.

## 2024-04-15 NOTE — TELEPHONE ENCOUNTER
----- Message from Darci Guthrie MD sent at 4/15/2024  3:39 PM CDT -----  Please call patient. She's usually very straight forward. Regarding the groin pain: Since did have UTI recently, let's go ahead and get UA. I put as Home Collect so she can go to nearest lab. If not already taking, she can use the Gabapentin 1 pill 2 to 3 times daily if needed for leg pains.    Thanks,  Myles

## 2024-04-16 ENCOUNTER — LAB VISIT (OUTPATIENT)
Dept: LAB | Facility: HOSPITAL | Age: 79
End: 2024-04-16
Attending: INTERNAL MEDICINE
Payer: MEDICARE

## 2024-04-16 ENCOUNTER — OUTPATIENT CASE MANAGEMENT (OUTPATIENT)
Dept: ADMINISTRATIVE | Facility: OTHER | Age: 79
End: 2024-04-16
Payer: MEDICARE

## 2024-04-16 DIAGNOSIS — R10.2 PELVIC PAIN: ICD-10-CM

## 2024-04-16 LAB
BACTERIA #/AREA URNS AUTO: ABNORMAL /HPF
BILIRUB UR QL STRIP: NEGATIVE
CLARITY UR REFRACT.AUTO: ABNORMAL
COLOR UR AUTO: YELLOW
GLUCOSE UR QL STRIP: NEGATIVE
HGB UR QL STRIP: NEGATIVE
KETONES UR QL STRIP: NEGATIVE
LEUKOCYTE ESTERASE UR QL STRIP: ABNORMAL
MICROSCOPIC COMMENT: ABNORMAL
NITRITE UR QL STRIP: POSITIVE
PH UR STRIP: 5 [PH] (ref 5–8)
PROT UR QL STRIP: NEGATIVE
RBC #/AREA URNS AUTO: 1 /HPF (ref 0–4)
SP GR UR STRIP: 1 (ref 1–1.03)
SQUAMOUS #/AREA URNS AUTO: 3 /HPF
URN SPEC COLLECT METH UR: ABNORMAL
WBC #/AREA URNS AUTO: 83 /HPF (ref 0–5)
WBC CLUMPS UR QL AUTO: ABNORMAL

## 2024-04-16 PROCEDURE — 87077 CULTURE AEROBIC IDENTIFY: CPT | Performed by: INTERNAL MEDICINE

## 2024-04-16 PROCEDURE — 87086 URINE CULTURE/COLONY COUNT: CPT | Performed by: INTERNAL MEDICINE

## 2024-04-16 PROCEDURE — 87186 SC STD MICRODIL/AGAR DIL: CPT | Performed by: INTERNAL MEDICINE

## 2024-04-16 PROCEDURE — 81001 URINALYSIS AUTO W/SCOPE: CPT | Performed by: INTERNAL MEDICINE

## 2024-04-16 PROCEDURE — 87088 URINE BACTERIA CULTURE: CPT | Performed by: INTERNAL MEDICINE

## 2024-04-16 NOTE — PROGRESS NOTES
Outpatient Care Management  Plan of Care Follow Up Visit    Patient: Anayeli Judd  MRN: [id number]  Date of Service: 04/16/2024  Completed by: Elizabeth Lea RN  Referral Date: 01/22/2024    Reason for Visit   Patient presents with    OPCM Chart Review                                                                                                           OPCM RN Follow Up Call                                                                                                                                                                                                                                                                                                                                                                                                                                         Other       Brief Summary: spoke with patient briefly, describes groin pain. She has informed MD office and a UA was done. She is awaiting results. (Currently test in process) - patient aware. She agrees to call MD office in the morning for results. Encouraged increased water intake - voiced understanding.   Next Steps: plan to follow up next week - patient agrees. Plan to discharge from OPCM if no new issues arise. Encouraged her to call MD office with needs/concerns - voiced understanding.   RIMMA Lea RN

## 2024-04-17 ENCOUNTER — TELEPHONE (OUTPATIENT)
Dept: INTERNAL MEDICINE | Facility: CLINIC | Age: 79
End: 2024-04-17
Payer: MEDICARE

## 2024-04-17 ENCOUNTER — OFFICE VISIT (OUTPATIENT)
Dept: HEPATOLOGY | Facility: CLINIC | Age: 79
End: 2024-04-17
Payer: MEDICARE

## 2024-04-17 ENCOUNTER — LAB VISIT (OUTPATIENT)
Dept: LAB | Facility: HOSPITAL | Age: 79
End: 2024-04-17
Attending: INTERNAL MEDICINE
Payer: MEDICARE

## 2024-04-17 ENCOUNTER — PATIENT MESSAGE (OUTPATIENT)
Dept: INTERNAL MEDICINE | Facility: CLINIC | Age: 79
End: 2024-04-17
Payer: MEDICARE

## 2024-04-17 VITALS
WEIGHT: 138 LBS | OXYGEN SATURATION: 98 % | DIASTOLIC BLOOD PRESSURE: 66 MMHG | HEIGHT: 62 IN | SYSTOLIC BLOOD PRESSURE: 124 MMHG | BODY MASS INDEX: 25.4 KG/M2 | HEART RATE: 98 BPM

## 2024-04-17 DIAGNOSIS — R74.8 ABNORMAL LIVER ENZYMES: ICD-10-CM

## 2024-04-17 DIAGNOSIS — K74.69 OTHER CIRRHOSIS OF LIVER: ICD-10-CM

## 2024-04-17 DIAGNOSIS — K76.6 PORTAL HYPERTENSION: ICD-10-CM

## 2024-04-17 DIAGNOSIS — I85.10 SECONDARY ESOPHAGEAL VARICES WITHOUT BLEEDING: ICD-10-CM

## 2024-04-17 DIAGNOSIS — N30.00 ACUTE CYSTITIS WITHOUT HEMATURIA: Primary | ICD-10-CM

## 2024-04-17 DIAGNOSIS — K74.69 COMPENSATED LIVER DISEASE: ICD-10-CM

## 2024-04-17 DIAGNOSIS — K76.0 METALD: ICD-10-CM

## 2024-04-17 DIAGNOSIS — K76.9 LIVER DISEASE, UNSPECIFIED: ICD-10-CM

## 2024-04-17 DIAGNOSIS — F10.90 METALD: ICD-10-CM

## 2024-04-17 LAB
AFP SERPL-MCNC: 4 NG/ML (ref 0–8.4)
ALBUMIN SERPL BCP-MCNC: 3.5 G/DL (ref 3.5–5.2)
ALP SERPL-CCNC: 104 U/L (ref 55–135)
ALT SERPL W/O P-5'-P-CCNC: 32 U/L (ref 10–44)
ANION GAP SERPL CALC-SCNC: 9 MMOL/L (ref 8–16)
AST SERPL-CCNC: 46 U/L (ref 10–40)
BASOPHILS # BLD AUTO: 0.04 K/UL (ref 0–0.2)
BASOPHILS NFR BLD: 0.7 % (ref 0–1.9)
BILIRUB DIRECT SERPL-MCNC: 0.3 MG/DL (ref 0.1–0.3)
BILIRUB SERPL-MCNC: 0.9 MG/DL (ref 0.1–1)
BUN SERPL-MCNC: 13 MG/DL (ref 8–23)
CALCIUM SERPL-MCNC: 9.4 MG/DL (ref 8.7–10.5)
CHLORIDE SERPL-SCNC: 101 MMOL/L (ref 95–110)
CO2 SERPL-SCNC: 23 MMOL/L (ref 23–29)
CREAT SERPL-MCNC: 1.1 MG/DL (ref 0.5–1.4)
DIFFERENTIAL METHOD BLD: ABNORMAL
EOSINOPHIL # BLD AUTO: 0.2 K/UL (ref 0–0.5)
EOSINOPHIL NFR BLD: 2.9 % (ref 0–8)
ERYTHROCYTE [DISTWIDTH] IN BLOOD BY AUTOMATED COUNT: 13.8 % (ref 11.5–14.5)
EST. GFR  (NO RACE VARIABLE): 50.5 ML/MIN/1.73 M^2
GLUCOSE SERPL-MCNC: 268 MG/DL (ref 70–110)
HCT VFR BLD AUTO: 37.4 % (ref 37–48.5)
HGB BLD-MCNC: 12.7 G/DL (ref 12–16)
IMM GRANULOCYTES # BLD AUTO: 0.03 K/UL (ref 0–0.04)
IMM GRANULOCYTES NFR BLD AUTO: 0.5 % (ref 0–0.5)
INR PPP: 1 (ref 0.8–1.2)
LYMPHOCYTES # BLD AUTO: 1.5 K/UL (ref 1–4.8)
LYMPHOCYTES NFR BLD: 25.9 % (ref 18–48)
MCH RBC QN AUTO: 35 PG (ref 27–31)
MCHC RBC AUTO-ENTMCNC: 34 G/DL (ref 32–36)
MCV RBC AUTO: 103 FL (ref 82–98)
MONOCYTES # BLD AUTO: 0.5 K/UL (ref 0.3–1)
MONOCYTES NFR BLD: 9.6 % (ref 4–15)
NEUTROPHILS # BLD AUTO: 3.4 K/UL (ref 1.8–7.7)
NEUTROPHILS NFR BLD: 60.4 % (ref 38–73)
NRBC BLD-RTO: 0 /100 WBC
PLATELET # BLD AUTO: 88 K/UL (ref 150–450)
PMV BLD AUTO: 11.1 FL (ref 9.2–12.9)
POTASSIUM SERPL-SCNC: 5.1 MMOL/L (ref 3.5–5.1)
PROT SERPL-MCNC: 7.8 G/DL (ref 6–8.4)
PROTHROMBIN TIME: 10.9 SEC (ref 9–12.5)
RBC # BLD AUTO: 3.63 M/UL (ref 4–5.4)
SODIUM SERPL-SCNC: 133 MMOL/L (ref 136–145)
WBC # BLD AUTO: 5.6 K/UL (ref 3.9–12.7)

## 2024-04-17 PROCEDURE — 99215 OFFICE O/P EST HI 40 MIN: CPT | Mod: PBBFAC | Performed by: INTERNAL MEDICINE

## 2024-04-17 PROCEDURE — 85610 PROTHROMBIN TIME: CPT | Performed by: INTERNAL MEDICINE

## 2024-04-17 PROCEDURE — 85025 COMPLETE CBC W/AUTO DIFF WBC: CPT | Performed by: INTERNAL MEDICINE

## 2024-04-17 PROCEDURE — 80076 HEPATIC FUNCTION PANEL: CPT | Performed by: INTERNAL MEDICINE

## 2024-04-17 PROCEDURE — 36415 COLL VENOUS BLD VENIPUNCTURE: CPT | Performed by: INTERNAL MEDICINE

## 2024-04-17 PROCEDURE — 99999 PR PBB SHADOW E&M-EST. PATIENT-LVL V: CPT | Mod: PBBFAC,,, | Performed by: INTERNAL MEDICINE

## 2024-04-17 PROCEDURE — 80048 BASIC METABOLIC PNL TOTAL CA: CPT | Performed by: INTERNAL MEDICINE

## 2024-04-17 PROCEDURE — 99213 OFFICE O/P EST LOW 20 MIN: CPT | Mod: S$PBB,,, | Performed by: INTERNAL MEDICINE

## 2024-04-17 PROCEDURE — 82105 ALPHA-FETOPROTEIN SERUM: CPT | Performed by: INTERNAL MEDICINE

## 2024-04-17 RX ORDER — CIPROFLOXACIN 250 MG/1
250 TABLET, FILM COATED ORAL 2 TIMES DAILY
Qty: 14 TABLET | Refills: 0 | Status: SHIPPED | OUTPATIENT
Start: 2024-04-17 | End: 2024-04-24

## 2024-04-17 NOTE — TELEPHONE ENCOUNTER
----- Message from Darci Guthrie MD sent at 4/17/2024 10:28 AM CDT -----  Please let her know it does look like a urinary tract infection on urine sample she gave. Sending antibiotic to her [facility] now.    Thanks,  Myles

## 2024-04-17 NOTE — TELEPHONE ENCOUNTER
Called the patient and she was advised of her urine results. I let her know that an antibiotic was sent to her pharmacy. Patient will call back if symptoms do not improve or get worse. Patient verbalized understanding.

## 2024-04-17 NOTE — PROGRESS NOTES
Hepatology Follow Up Note    Referring provider: No ref. provider found  PCP: Darci Guthrie MD    Chief complaint: follow up compensated Met-ALD cirrhosis     Last seen in clinic on: 11/3/2023    Brief HPI:  Anayeli Judd is a 79 y.o. female with compensated Met-ALD cirrhosis complicated by non-bleeding esophageal varices, DM Type 2, HLD, who presents for scheduled follow up.    Interval Events:  - Underwent endoscopy with attempted variceal ligation on 11/15/23, but unable to be completed due to inability to intubate the esophagus with the  on the scope.    - The patient is accompanied by her daughter, and sitter.  -  Feels ok. Main complaints is fatigue, excessive sleepiness.   - The patient denies abdominal distension, encephalopathy, jaundice, gross GI bleeding.  - This is the extent of the patient's complaints at this time.      Past Medical History:   Diagnosis Date    Abdominal pain     Allergic rhinitis     Arthritis     Blood platelet disorder     Blood platelet disorder     Blood transfusion     during delivery and     Bowel obstruction     Cervical radiculopathy     followed by dr cloud    Colon cancer     transverse colon; resected; Stage IIA (pT3 pN0 MX)    Degenerative joint disease (DJD) of lumbar spine     Diabetes mellitus     Diarrhea     Falls 2020    Family history of breast cancer     Family history of colon cancer     Fatty liver     GERD (gastroesophageal reflux disease)     History of shingles     Hyperlipidemia     Hypomagnesemia     Hypothyroidism     Irritable bowel syndrome     Microscopic colitis     treated     Migraine     Myelodysplastic syndrome     Raynaud phenomenon     Raynaud's disease     Squamous cell carcinoma of skin     Type 2 diabetes mellitus     Usual hyperplasia of lactiferous duct        Past Surgical History:   Procedure Laterality Date    ADENOIDECTOMY      APPENDECTOMY      BACK SURGERY      BREAST BIOPSY  1970    BREAST CYST  EXCISION  1970?    BREAST LUMPECTOMY  1970    CARPAL TUNNEL RELEASE      bilateral      SECTION      CHOLECYSTECTOMY  1965    COLECTOMY  2011    Transverse colon resection by Dr. Aguirre    COLONOSCOPY N/A 2017    Procedure: COLONOSCOPY;  Surgeon: Manjit Alvarez MD;  Location: [facility] (4TH FLR);  Service: Endoscopy;  Laterality: N/A;    COLONOSCOPY N/A 2019    Procedure: COLONOSCOPY;  Surgeon: Ramiro Jefferson MD;  Location: [facility] (4TH FLR);  Service: Endoscopy;  Laterality: N/A;  PM prep    COLONOSCOPY N/A 2022    Procedure: COLONOSCOPY;  Surgeon: Ramiro Jefferson MD;  Location: [facility] (2ND FLR);  Service: Endoscopy;  Laterality: N/A;  EGD and colonoscopy in 6-8 weeks from now     states has constipation. -extended Golytely prep    CYSTOSCOPY N/A 2021    Procedure: CYSTOSCOPY;  Surgeon: Viridiana Valenzuela MD;  Location: [facility] OR 1ST FLR;  Service: Urology;  Laterality: N/A;    ENDOSCOPIC ULTRASOUND OF UPPER GASTROINTESTINAL TRACT N/A 2018    Procedure: ULTRASOUND, UPPER GI TRACT, ENDOSCOPIC;  Surgeon: Jose Hess MD;  Location: [facility];  Service: Endoscopy;  Laterality: N/A;    ENDOSCOPIC ULTRASOUND OF UPPER GASTROINTESTINAL TRACT N/A 2019    Procedure: ULTRASOUND, UPPER GI TRACT, ENDOSCOPIC;  Surgeon: Jose Hess MD;  Location: [facility] (2ND FLR);  Service: Endoscopy;  Laterality: N/A;    ENDOSCOPIC ULTRASOUND OF UPPER GASTROINTESTINAL TRACT N/A 10/25/2023    Procedure: ULTRASOUND, UPPER GI TRACT, ENDOSCOPIC;  Surgeon: Jose Campos MD;  Location: [facility];  Service: Endoscopy;  Laterality: N/A;  10/20/23: instructions sent via portal-GD    ESOPHAGOGASTRODUODENOSCOPY N/A 2018    Procedure: EGD (ESOPHAGOGASTRODUODENOSCOPY);  Surgeon: Angelo Reynolds MD;  Location: [facility] (2ND FLR);  Service: Endoscopy;  Laterality: N/A;    ESOPHAGOGASTRODUODENOSCOPY N/A 2019    Procedure: EGD (ESOPHAGOGASTRODUODENOSCOPY);  Surgeon: Ramiro  GIO Jefferson MD;  Location: [facility] (4TH FLR);  Service: Endoscopy;  Laterality: N/A;    ESOPHAGOGASTRODUODENOSCOPY N/A 05/04/2022    Procedure: EGD (ESOPHAGOGASTRODUODENOSCOPY);  Surgeon: Ramiro Jefferson MD;  Location: [facility] (2ND FLR);  Service: Endoscopy;  Laterality: N/A;  fully vaccinated-GT    ESOPHAGOGASTRODUODENOSCOPY N/A 10/25/2023    Procedure: EGD (ESOPHAGOGASTRODUODENOSCOPY);  Surgeon: Jose Campos MD;  Location: [facility];  Service: Endoscopy;  Laterality: N/A;    ESOPHAGOGASTRODUODENOSCOPY N/A 11/15/2023    Procedure: ESOPHAGOGASTRODUODENOSCOPY (EGD);  Surgeon: Db Sanchez MD;  Location: [facility] (4TH FLR);  Service: Endoscopy;  Laterality: N/A;  New diagnosis of esophageal varices. Not a candidate for beta blocker due to soft blood pressures. Recommend EV banding.  PT/INR done on 11/3/23 and CBC done on 9/30/23  ok per Dr. Espinoza to be done by 1st available provider-see tele encounter-st  1    EYE SURGERY      Cataract Removal    FLUOROSCOPIC URODYNAMIC STUDY N/A 11/09/2021    Procedure: URODYNAMIC STUDY, FLUOROSCOPIC;  Surgeon: Viridiana Valenzuela MD;  Location: [facility] OR 1ST FLR;  Service: Urology;  Laterality: N/A;  90 minutes     HYSTERECTOMY      JOINT REPLACEMENT      OOPHORECTOMY  1970    posterolateral fusion with autograft bone and Eun mineralized bone matrix  02/01/2013    at [facility] for lumbar spine stenosis    SMALL INTESTINE SURGERY      SPINE SURGERY      TOE SURGERY      TONSILLECTOMY      TRANSFORAMINAL EPIDURAL INJECTION OF STEROID Bilateral 12/21/2022    Procedure: Injection,steroid,epidural, Todd L5/S1 TF CONTRAST DIRECT REFERRAL;  Surgeon: Toni Osorio MD;  Location: [facility] PAIN MGT;  Service: Pain Management;  Laterality: Bilateral;    TRIGGER FINGER RELEASE         Family History   Problem Relation Name Age of Onset    Heart disease Father August 50        Mi age 50    Colon cancer Father August     Bladder Cancer Mother Mariana         non smoker    Cataracts Mother  Mariana     Glaucoma Mother Mariana     Heart disease Mother Mariana     Hyperlipidemia Mother Mariana     Kidney disease Mother Mariana     Breast cancer Sister Sr. Damaris Dye 79    Arthritis Daughter Wendy     Asthma Daughter Wendy     Depression Daughter Wendy     Hypertension Daughter Wendy     Stroke Daughter Wendy 40    Arthritis Brother Timoteo     Colon cancer Brother Timoteo 70    Alcohol abuse Brother Timoteo     Parkinsonism Brother Ronnie     Alcohol abuse Brother Ronnie     Depression Daughter Danielle     Stroke Daughter Danielle     Alcohol abuse Brother Harjeet     Arthritis Brother Harjeet     Asthma Daughter Kimberly     Depression Daughter Kimberly     Celiac disease Neg Hx      Cirrhosis Neg Hx      Colon polyps Neg Hx      Crohn's disease Neg Hx      Cystic fibrosis Neg Hx      Esophageal cancer Neg Hx      Hemochromatosis Neg Hx      Inflammatory bowel disease Neg Hx      Irritable bowel syndrome Neg Hx      Liver cancer Neg Hx      Liver disease Neg Hx      Rectal cancer Neg Hx      Stomach cancer Neg Hx      Ulcerative colitis Neg Hx      Eliazar's disease Neg Hx      Amblyopia Neg Hx      Blindness Neg Hx      Macular degeneration Neg Hx      Retinal detachment Neg Hx      Strabismus Neg Hx      Melanoma Neg Hx         Social History     Tobacco Use    Smoking status: Never    Smokeless tobacco: Never   Substance Use Topics    Alcohol use: Not Currently     Comment: socially, rarely    Drug use: Never       Current Outpatient Medications   Medication Sig Dispense Refill    acetaminophen (TYLENOL) 650 MG TbSR Take 1,300 mg by mouth 2 (two) times a day.      amoxicillin (AMOXIL) 500 MG Tab Take 500 mg by mouth every 12 (twelve) hours.      ascorbic acid, vitamin C, (VITAMIN C) 1000 MG tablet Take 1,000 mg by mouth once daily.      biotin 10,000 mcg TbDL Take 1 tablet by mouth once daily.       blood sugar diagnostic (FREESTYLE LITE STRIPS) Strp 1 strip by Misc.(Non-Drug; Combo Route) route 3 (three) times daily.  200 strip 5    blood-glucose meter kit Use as instructed 1 each 0    calcium-vitamin D3 (OS-KASSANDRA 500 + D3) 500 mg-5 mcg (200 unit) per tablet Take 1 tablet by mouth once daily.      cholestyramine (QUESTRAN) 4 gram packet Take 1 packet (4 g total) by mouth 3 (three) times daily with meals. 270 packet 3    ciprofloxacin HCl (CIPRO) 250 MG tablet Take 1 tablet (250 mg total) by mouth 2 (two) times daily. for 7 days 14 tablet 0    conjugated estrogens (PREMARIN) vaginal cream INSERT 0.5 GRAM VAGINALLY 2 TIMES A WEEK 30 g 3    cranberry extract 200 mg Cap Take 1 capsule by mouth once daily.      diphenoxylate-atropine 2.5-0.025 mg (LOMOTIL) 2.5-0.025 mg per tablet Take 1 tablet by mouth 3 (three) times daily as needed for Diarrhea. 30 tablet 0    donepeziL (ARICEPT) 10 MG tablet TAKE 1 TABLET(10 MG) BY MOUTH EVERY EVENING 90 tablet 3    DULoxetine (CYMBALTA) 30 MG capsule TAKE 1 CAPSULE BY MOUTH TWICE  DAILY 180 capsule 1    ferrous sulfate 324 mg (65 mg iron) TbEC Take 1 tablet (324 mg total) by mouth once daily.  0    flash glucose scanning reader (FREESTYLE EFREN 14 DAY READER) Misc Check blood glucose level 3 times daily. 1 each 0    flash glucose sensor (FREESTYLE EFREN 14 DAY SENSOR) Kit 1 application  by Misc.(Non-Drug; Combo Route) route every 14 (fourteen) days. Disp 30 or 90 day refill 6 kit 3    gabapentin (NEURONTIN) 300 MG capsule Take 1 to 2 capsules 3 times daily if needed for leg pain. 180 capsule 5    hydrocortisone 2.5 % cream Apply topically 2 (two) times daily as needed. 1 each 1    lancets (FREESTYLE LANCETS) 28 gauge Misc 30 lancets by Misc.(Non-Drug; Combo Route) route Daily. 30 each 3    levothyroxine (SYNTHROID) 75 MCG tablet TAKE 1 TABLET(75 MCG) BY MOUTH BEFORE BREAKFAST 90 tablet 3    levothyroxine (SYNTHROID) 75 MCG tablet TAKE 1 TABLET BY MOUTH BEFORE  BREAKFAST 90 tablet 3    LIDOcaine (LIDODERM) 5 % APPLY 1 TO 3 PATCHES TO BACK DAILY. REMOVE WITHIN 12 HOURS 30 patch 2    linaGLIPtin  (TRADJENTA) 5 mg Tab tablet TAKE 1 TABLET(5 MG) BY MOUTH EVERY DAY 90 tablet 3    loperamide (IMODIUM) 2 mg capsule Take 1 capsule (2 mg total) by mouth 2 (two) times daily as needed for Diarrhea. 30 capsule 1    loperamide (IMODIUM) 2 mg capsule Take 1 capsule (2 mg total) by mouth 4 (four) times daily as needed for Diarrhea. 10 capsule 0    magnesium 30 mg Tab Take 30 mg by mouth once. Patient does not know actual dose in MG      metFORMIN (GLUCOPHAGE) 500 MG tablet TAKE 1 TABLET(500 MG) BY MOUTH TWICE DAILY WITH MEALS 180 tablet 1    mometasone (ELOCON) 0.1 % ointment Apply topically 2 (two) times a day. Mix 50/50 with mupirocin ointment. Apply in each nostril twice daily. 15 g 1    montelukast (SINGULAIR) 10 mg tablet TAKE 1 TABLET BY MOUTH  DAILY 90 tablet 3    ondansetron (ZOFRAN-ODT) 8 MG TbDL Take 1 tablet (8 mg total) by mouth every 8 (eight) hours as needed (nausea). 30 tablet 2    pantoprazole (PROTONIX) 20 MG tablet TAKE 1 TABLET(20 MG) BY MOUTH TWICE DAILY 180 tablet 1    traZODone (DESYREL) 50 MG tablet TAKE 1 TABLET(50 MG) BY MOUTH EVERY EVENING 90 tablet 1    ubrogepant (UBRELVY) 100 mg tablet Take 1 tablet daily as needed for migraine headache. May take 1 additional tablet 2 hours later if needed. 16 tablet 2    valACYclovir (VALTREX) 1000 MG tablet TAKE 1 TABLET(1000 MG) BY MOUTH EVERY DAY 30 tablet 2    atorvastatin (LIPITOR) 40 MG tablet Take 40 mg by mouth once daily.      HYDROcodone-acetaminophen (NORCO) 5-325 mg per tablet Take 1 tablet by mouth every 6 (six) hours as needed for Pain. (Patient not taking: Reported on 1/30/2024) 28 tablet 0    oxyCODONE (ROXICODONE) 5 MG immediate release tablet Take 1 tablet (5 mg total) by mouth every 4 (four) hours as needed for Pain. (Patient not taking: Reported on 4/17/2024) 7 tablet 0    polyethylene glycol (GLYCOLAX) 17 gram PwPk Take 17 g by mouth once daily. (Patient not taking: Reported on 4/17/2024) 90 each 3     No current facility-administered  "medications for this visit.       Review of patient's allergies indicates:   Allergen Reactions    Codeine Itching and Nausea And Vomiting    Dilaudid [hydromorphone (bulk)] Other (See Comments)     Oversedating, head burning. Pt prefers to avoid.       Percocet [oxycodone-acetaminophen] Itching    Sulfa (sulfonamide antibiotics) Itching and Nausea And Vomiting    Dilaudid [hydromorphone]     Latex, natural rubber Rash            Vitals:    04/17/24 1418   BP: 124/66   Pulse: 98   SpO2: 98%   Weight: 62.6 kg (138 lb 0.1 oz)   Height: 5' 2" (1.575 m)   Body mass index is 25.24 kg/m².    Physical Exam  Constitutional:       General: She is not in acute distress.  Eyes:      General: No scleral icterus.  Cardiovascular:      Rate and Rhythm: Normal rate.   Pulmonary:      Effort: Pulmonary effort is normal.   Abdominal:      General: Abdomen is flat. There is no distension.   Musculoskeletal:         General: No swelling.   Skin:     Coloration: Skin is not jaundiced.   Neurological:      General: No focal deficit present.      Mental Status: She is alert.      Comments: No asterixis.    Psychiatric:         Thought Content: Thought content normal.         LABS: I personally reviewed pertinent laboratory findings.    Lab Results   Component Value Date    WBC 6.60 02/05/2024    HGB 11.6 (L) 02/05/2024    HCT 33 (L) 02/05/2024     (H) 02/05/2024    PLT 75 (L) 02/05/2024       Lab Results   Component Value Date     (L) 04/17/2024    K 5.1 04/17/2024     04/17/2024    CO2 21 (L) 02/05/2024    BUN 14 02/05/2024    CREATININE 0.9 02/05/2024    CALCIUM 9.4 04/17/2024    ANIONGAP 10 02/05/2024    ESTGFRAFRICA >60.0 06/11/2022    EGFRNONAA >60.0 06/11/2022       Lab Results   Component Value Date    ALT 17 02/05/2024    AST 29 02/05/2024    GGT 72 (H) 11/03/2023    ALKPHOS 64 02/05/2024    BILITOT 0.8 02/05/2024       Lab Results   Component Value Date    HEPAIGM Negative 09/09/2020    HEPBIGM Negative " "09/09/2020    HEPBCAB Negative 09/09/2020    HEPCAB Non-reactive 07/07/2023       Lab Results   Component Value Date    TOSHIA Negative <1:160 07/31/2014      No results found for: "AFP"    MELD 3.0: 8 at 1/14/2024  6:09 AM  MELD-Na: 6 at 1/14/2024  6:09 AM  Calculated from:  Serum Creatinine: 1.0 mg/dL at 1/14/2024  6:09 AM  Serum Sodium: 139 mmol/L (Using max of 137 mmol/L) at 1/14/2024  6:09 AM  Total Bilirubin: 0.8 mg/dL (Using min of 1 mg/dL) at 1/12/2024 10:33 PM  Serum Albumin: 3.4 g/dL at 1/12/2024 10:33 PM  INR(ratio): 1.0 at 1/12/2024 10:33 PM  Age at listing (hypothetical): 79 years  Sex: Female at 1/14/2024  6:09 AM       IMAGING: I personally reviewed imaging studies.    CT A/P with contrast 2/5/2024  Impression:     This report was flagged in Epic as abnormal.     1. Surgical changes of small-bowel resection.  There is prominence of the small bowel at the levels of anastomoses, noting no high-grade bowel obstruction.  Some degree of stricture at the distal small bowel anastomosis may be present, correlation is needed.  There is liquid stool within the large bowel, developing diarrheal illness is a consideration.  Correlation is advised.  2. Findings suggesting hepatic steatosis, correlation with LFTs recommended.  3. Mild distention of the urinary bladder, correlation with any history of urinary retention.  Correlation with urinalysis recommended to exclude changes of cystitis.  4. Please see above for several additional findings.     EGD 11/15/2023  Indications:           For therapy of esophageal varices   Impression:    - Grade II and large (> 5 mm) esophageal varices.                          - There was increased resistance to passage of the                          endoscope through the oropharynx. The endoscope                          alone was able to pass with mild difficulty but                          the esophagus could not be intubated with the                           in place.      "                     - Portal hypertensive gastropathy.                          - Normal examined duodenum.                          - No specimens collected.     FibroScan 11/3/2023  Findings  Median liver stiffness score:  27.3  CAP Reading: dB/m:  210     Interpretation  Fibrosis interpretation is based on medial liver stiffness - Kilopascal (kPa).     Fibrosis Stage:  F4  Steatosis interpretation is based on controlled attenuation parameter - (dB/m).     Steatosis Grade:  <S1    Assessment: Anayeli Judd is a 79 y.o. female with compensated Met-ALD cirrhosis complicated by non-bleeding esophageal varices, DM Type 2, HLD, who presents for scheduled follow up.    1. MetALD    2. Compensated liver disease    3. Secondary esophageal varices without bleeding    4. Portal hypertension    5. Abnormal liver enzymes      #Compensated Met-ALD Cirrhosis    Volume: No an active issue.  Infection: No concern at this time.   Bleeding: No history of EVB. Last EGD large EV but unable to complete band ligation due to inability to intubate esophagus with  (11/15/23). Discussed with NSBB with patient and family -> decided to defer NSBB to avoid hypotension / falls.   Encephalopathy: No prior history.  Screening: Check AFP. Check liver US.  Immunization: HAV immune. Recommend HBV vaccination.  Transplant: Low MELD. Compensated disease. Barrier includes advanced age.    Return to clinic in 6 months.    Jaqueline Espinoza MD  Staff Physician  Hepatology and Liver Transplant  [facility]  [facility] [place]

## 2024-04-19 LAB — BACTERIA UR CULT: ABNORMAL

## 2024-04-24 ENCOUNTER — OUTPATIENT CASE MANAGEMENT (OUTPATIENT)
Dept: ADMINISTRATIVE | Facility: OTHER | Age: 79
End: 2024-04-24
Payer: MEDICARE

## 2024-04-25 ENCOUNTER — LAB VISIT (OUTPATIENT)
Dept: LAB | Facility: HOSPITAL | Age: 79
End: 2024-04-25
Attending: INTERNAL MEDICINE
Payer: MEDICARE

## 2024-04-25 ENCOUNTER — TELEPHONE (OUTPATIENT)
Dept: INTERNAL MEDICINE | Facility: CLINIC | Age: 79
End: 2024-04-25
Payer: MEDICARE

## 2024-04-25 ENCOUNTER — PATIENT MESSAGE (OUTPATIENT)
Dept: INTERNAL MEDICINE | Facility: CLINIC | Age: 79
End: 2024-04-25
Payer: MEDICARE

## 2024-04-25 DIAGNOSIS — N39.0 URINARY TRACT INFECTION WITHOUT HEMATURIA, SITE UNSPECIFIED: ICD-10-CM

## 2024-04-25 DIAGNOSIS — R10.9 ACUTE LEFT FLANK PAIN: Primary | ICD-10-CM

## 2024-04-25 DIAGNOSIS — R10.9 ACUTE LEFT FLANK PAIN: ICD-10-CM

## 2024-04-25 LAB
BILIRUB UR QL STRIP: NEGATIVE
CLARITY UR REFRACT.AUTO: CLEAR
COLOR UR AUTO: YELLOW
GLUCOSE UR QL STRIP: NEGATIVE
HGB UR QL STRIP: NEGATIVE
KETONES UR QL STRIP: NEGATIVE
LEUKOCYTE ESTERASE UR QL STRIP: NEGATIVE
NITRITE UR QL STRIP: NEGATIVE
PH UR STRIP: 6 [PH] (ref 5–8)
PROT UR QL STRIP: NEGATIVE
SP GR UR STRIP: 1.01 (ref 1–1.03)
URN SPEC COLLECT METH UR: NORMAL

## 2024-04-25 PROCEDURE — 81003 URINALYSIS AUTO W/O SCOPE: CPT | Performed by: INTERNAL MEDICINE

## 2024-04-25 RX ORDER — CIPROFLOXACIN 500 MG/1
500 TABLET ORAL 2 TIMES DAILY
Qty: 14 TABLET | Refills: 0 | Status: SHIPPED | OUTPATIENT
Start: 2024-04-25 | End: 2024-05-02

## 2024-04-25 NOTE — TELEPHONE ENCOUNTER
----- Message from Sarahy Clark LPN sent at 4/25/2024 10:11 AM CDT -----  Regarding: UTI/Back pain  I spoke to Mrs Judd and she states she completed the antibiotics and is still having pain. Her daughter called while I was on the phone with the patient. She also states that the patient is now having the pain on the left side and before it was on the right . She states the patient just is not felling well at all. Low grade temp last night 99.7. No fever this morning, Patient and daughter Danielle is requesting a call back this morning please [phone]

## 2024-04-25 NOTE — TELEPHONE ENCOUNTER
Some nausea and now left lower back/flank pain. Had unremarkable labs last week for Hepatology. Will have her bring another urine sample to lab. Will extend Cipro course 1 wk and use higher dosage at 500 mg bid this time. Coming for liver US soon. Will get kidneys imaged with US at that time as well.

## 2024-04-26 ENCOUNTER — OUTPATIENT CASE MANAGEMENT (OUTPATIENT)
Dept: ADMINISTRATIVE | Facility: OTHER | Age: 79
End: 2024-04-26
Payer: MEDICARE

## 2024-04-29 ENCOUNTER — OUTPATIENT CASE MANAGEMENT (OUTPATIENT)
Dept: ADMINISTRATIVE | Facility: OTHER | Age: 79
End: 2024-04-29
Payer: MEDICARE

## 2024-04-29 NOTE — PROGRESS NOTES
Outpatient Care Management  Plan of Care Follow Up Visit    Patient: Anayeli Judd  MRN: [id number]  Date of Service: 04/29/2024  Completed by: Elizabeth Lea RN  Referral Date: 01/22/2024    Reason for Visit   Patient presents with    OPCM Chart Review    OPCM RN Follow Up Call       Brief Summary: OPCM visit completed, care plan reviewed and updated.  Next Steps: plan to follow up with patient in approximately one week - patient agrees. Consider discharge from OPCM if no new issues arise.  RIMMA Lea RN

## 2024-04-30 ENCOUNTER — HOSPITAL ENCOUNTER (OUTPATIENT)
Dept: RADIOLOGY | Facility: HOSPITAL | Age: 79
Discharge: HOME | End: 2024-04-30
Attending: INTERNAL MEDICINE
Payer: MEDICARE

## 2024-04-30 DIAGNOSIS — K76.6 PORTAL HYPERTENSION: ICD-10-CM

## 2024-04-30 DIAGNOSIS — N39.0 URINARY TRACT INFECTION WITHOUT HEMATURIA, SITE UNSPECIFIED: ICD-10-CM

## 2024-04-30 DIAGNOSIS — I85.10 SECONDARY ESOPHAGEAL VARICES WITHOUT BLEEDING: ICD-10-CM

## 2024-04-30 DIAGNOSIS — R10.9 ACUTE LEFT FLANK PAIN: ICD-10-CM

## 2024-04-30 DIAGNOSIS — R74.8 ABNORMAL LIVER ENZYMES: ICD-10-CM

## 2024-04-30 PROCEDURE — 76770 US EXAM ABDO BACK WALL COMP: CPT | Mod: TC

## 2024-04-30 PROCEDURE — 76705 ECHO EXAM OF ABDOMEN: CPT | Mod: 26,GC,, | Performed by: RADIOLOGY

## 2024-04-30 PROCEDURE — 76770 US EXAM ABDO BACK WALL COMP: CPT | Mod: 26,,, | Performed by: RADIOLOGY

## 2024-04-30 PROCEDURE — 76705 ECHO EXAM OF ABDOMEN: CPT | Mod: TC

## 2024-05-02 ENCOUNTER — PATIENT MESSAGE (OUTPATIENT)
Dept: ADMINISTRATIVE | Facility: HOSPITAL | Age: 79
End: 2024-05-02
Payer: MEDICARE

## 2024-05-07 ENCOUNTER — OUTPATIENT CASE MANAGEMENT (OUTPATIENT)
Dept: ADMINISTRATIVE | Facility: OTHER | Age: 79
End: 2024-05-07
Payer: MEDICARE

## 2024-05-07 DIAGNOSIS — B02.9 HERPES ZOSTER WITHOUT COMPLICATION: ICD-10-CM

## 2024-05-07 RX ORDER — VALACYCLOVIR HYDROCHLORIDE 1 G/1
1000 TABLET, FILM COATED ORAL DAILY
Qty: 90 TABLET | Refills: 1 | Status: SHIPPED | OUTPATIENT
Start: 2024-05-07

## 2024-05-07 NOTE — TELEPHONE ENCOUNTER
Care Due:                  Date            Visit Type   Department     Provider  --------------------------------------------------------------------------------                                             [facility]ly [facility] -                              ESTABLISHED   INTERNAL  Last Visit: 06-      PATIENT      MEDICINE       Darci Guthrie                                           [facility] -                              ESTABLISHED   INTERNAL  Next Visit: 05-      PATIENT      MEDICINE       Darci Guthrie                                                            Last  Test          Frequency    Reason                     Performed    Due Date  --------------------------------------------------------------------------------    HBA1C.......  6 months...  linaGLIPtin, metFORMIN...  01- 07-    [facility] Embedded Care Due Messages. Reference number: [id number].   5/07/2024 12:00:24 PM CDT

## 2024-05-07 NOTE — PROGRESS NOTES
05/07/2024 We discussed the care plan and agree that maximum goals have been met. Reminded of recommended health maintenance screenings. Patient mentioned needing a refill of Valtrex - message sent to provider/PCP requesting a refill. We reviewed that care management is provided on a temporary basis and that it is time for discharge. Instructed patient to call PCP office directly for any non emergent health issues, and informed patient she may call me with any OPCM needs in the future. Verbalized understanding.   RIMMA Lea RN

## 2024-05-13 ENCOUNTER — OFFICE VISIT (OUTPATIENT)
Dept: INTERNAL MEDICINE | Facility: CLINIC | Age: 79
End: 2024-05-13
Payer: MEDICARE

## 2024-05-13 VITALS
HEART RATE: 98 BPM | DIASTOLIC BLOOD PRESSURE: 75 MMHG | BODY MASS INDEX: 25.68 KG/M2 | SYSTOLIC BLOOD PRESSURE: 122 MMHG | HEIGHT: 62 IN | WEIGHT: 139.56 LBS

## 2024-05-13 DIAGNOSIS — K74.69 COMPENSATED LIVER DISEASE: ICD-10-CM

## 2024-05-13 DIAGNOSIS — K56.50 SMALL BOWEL OBSTRUCTION DUE TO ADHESIONS: ICD-10-CM

## 2024-05-13 DIAGNOSIS — R19.8 IRREGULAR BOWEL HABITS: ICD-10-CM

## 2024-05-13 DIAGNOSIS — E03.9 ACQUIRED HYPOTHYROIDISM: ICD-10-CM

## 2024-05-13 DIAGNOSIS — E11.9 TYPE 2 DIABETES MELLITUS WITHOUT COMPLICATION, WITHOUT LONG-TERM CURRENT USE OF INSULIN: ICD-10-CM

## 2024-05-13 DIAGNOSIS — G47.00 INSOMNIA, UNSPECIFIED TYPE: ICD-10-CM

## 2024-05-13 DIAGNOSIS — Z00.00 ENCOUNTER FOR ANNUAL HEALTH EXAMINATION: Primary | ICD-10-CM

## 2024-05-13 DIAGNOSIS — N39.0 URINARY TRACT INFECTION WITHOUT HEMATURIA, SITE UNSPECIFIED: ICD-10-CM

## 2024-05-13 DIAGNOSIS — K21.9 GASTROESOPHAGEAL REFLUX DISEASE, UNSPECIFIED WHETHER ESOPHAGITIS PRESENT: ICD-10-CM

## 2024-05-13 LAB
ALBUMIN/CREAT UR: 5.8 UG/MG (ref 0–30)
BACTERIA #/AREA URNS AUTO: ABNORMAL /HPF
BILIRUB UR QL STRIP: NEGATIVE
CLARITY UR REFRACT.AUTO: CLEAR
COLOR UR AUTO: YELLOW
CREAT UR-MCNC: 86 MG/DL (ref 15–325)
GLUCOSE UR QL STRIP: NEGATIVE
HGB UR QL STRIP: NEGATIVE
HYALINE CASTS UR QL AUTO: 3 /LPF
KETONES UR QL STRIP: NEGATIVE
LEUKOCYTE ESTERASE UR QL STRIP: ABNORMAL
MICROALBUMIN UR DL<=1MG/L-MCNC: 5 UG/ML
MICROSCOPIC COMMENT: ABNORMAL
NITRITE UR QL STRIP: NEGATIVE
PH UR STRIP: 6 [PH] (ref 5–8)
PROT UR QL STRIP: NEGATIVE
RBC #/AREA URNS AUTO: 1 /HPF (ref 0–4)
SP GR UR STRIP: 1.01 (ref 1–1.03)
SQUAMOUS #/AREA URNS AUTO: 4 /HPF
URN SPEC COLLECT METH UR: ABNORMAL
WBC #/AREA URNS AUTO: 7 /HPF (ref 0–5)

## 2024-05-13 PROCEDURE — 99214 OFFICE O/P EST MOD 30 MIN: CPT | Mod: S$PBB,,, | Performed by: INTERNAL MEDICINE

## 2024-05-13 PROCEDURE — 99215 OFFICE O/P EST HI 40 MIN: CPT | Mod: PBBFAC | Performed by: INTERNAL MEDICINE

## 2024-05-13 PROCEDURE — 99999 PR PBB SHADOW E&M-EST. PATIENT-LVL V: CPT | Mod: PBBFAC,,, | Performed by: INTERNAL MEDICINE

## 2024-05-13 PROCEDURE — 81001 URINALYSIS AUTO W/SCOPE: CPT | Performed by: INTERNAL MEDICINE

## 2024-05-13 PROCEDURE — 82043 UR ALBUMIN QUANTITATIVE: CPT | Performed by: INTERNAL MEDICINE

## 2024-05-13 NOTE — PROGRESS NOTES
Subjective:       Patient ID: Anayeli Judd is a 79 y.o. female.    Chief Complaint: Annual Exam      HPI  Annual health exam. Reviewed medical, surgical, social and family history, medications, appropriate preventive health screenings, as well as vaccination history. Updates as noted below or in assessment and plan.    Chronic issues persist but overall stable.   Allergies flairing some with associated irritation of nasal passages. Saw ENT and got abx for ear effusion a few months ago. Notes still feeling of pressure b/l.  Lumbar pains with b/l sciatica. Taking gabapentin 300 bid, cymbalta. Night foot pains bothersome.  T2DM. BG high at times recently, 200 to 300. Admits carbs and sugars fair amount. Donut this morning for breakfast.  Recent groin pains have resolved post Cipro for UTI.  Constipation and diarrhea continue to fluctuate. Had normal bm this am. Uses Questran again since saw GI recently but admits uses it more in prn fashion. No fiber.    Past Medical History:   Diagnosis Date    Abdominal pain     Allergic rhinitis     Arthritis     Blood platelet disorder     Blood platelet disorder     Blood transfusion     during delivery and     Bowel obstruction     Cervical radiculopathy     followed by dr cloud    Cirrhosis of liver     Colon cancer     transverse colon; resected; Stage IIA (pT3 pN0 MX)    Degenerative joint disease (DJD) of lumbar spine     Diabetes mellitus     Diarrhea     Falls 2020    Family history of breast cancer     Family history of colon cancer     Fatty liver     GERD (gastroesophageal reflux disease)     History of shingles     Hyperlipidemia     Hypomagnesemia     Hypothyroidism     Irritable bowel syndrome     Microscopic colitis     treated     Migraine     Myelodysplastic syndrome     Raynaud phenomenon     Raynaud's disease     Squamous cell carcinoma of skin     Type 2 diabetes mellitus     Usual hyperplasia of lactiferous duct 1970         Current  Outpatient Medications:     acetaminophen (TYLENOL) 650 MG TbSR, Take 1,300 mg by mouth 2 (two) times a day., Disp: , Rfl:     amoxicillin (AMOXIL) 500 MG Tab, Take 500 mg by mouth every 12 (twelve) hours., Disp: , Rfl:     ascorbic acid, vitamin C, (VITAMIN C) 1000 MG tablet, Take 1,000 mg by mouth once daily., Disp: , Rfl:     atorvastatin (LIPITOR) 40 MG tablet, Take 40 mg by mouth once daily., Disp: , Rfl:     biotin 10,000 mcg TbDL, Take 1 tablet by mouth once daily. , Disp: , Rfl:     blood sugar diagnostic (FREESTYLE LITE STRIPS) Strp, 1 strip by Misc.(Non-Drug; Combo Route) route 3 (three) times daily., Disp: 200 strip, Rfl: 5    blood-glucose meter kit, Use as instructed, Disp: 1 each, Rfl: 0    calcium-vitamin D3 (OS-KASSANDRA 500 + D3) 500 mg-5 mcg (200 unit) per tablet, Take 1 tablet by mouth once daily., Disp: , Rfl:     cholestyramine (QUESTRAN) 4 gram packet, Take 1 packet (4 g total) by mouth 3 (three) times daily with meals., Disp: 270 packet, Rfl: 3    conjugated estrogens (PREMARIN) vaginal cream, INSERT 0.5 GRAM VAGINALLY 2 TIMES A WEEK, Disp: 30 g, Rfl: 3    cranberry extract 200 mg Cap, Take 1 capsule by mouth once daily., Disp: , Rfl:     diphenoxylate-atropine 2.5-0.025 mg (LOMOTIL) 2.5-0.025 mg per tablet, Take 1 tablet by mouth 3 (three) times daily as needed for Diarrhea., Disp: 30 tablet, Rfl: 0    donepeziL (ARICEPT) 10 MG tablet, TAKE 1 TABLET(10 MG) BY MOUTH EVERY EVENING, Disp: 90 tablet, Rfl: 3    DULoxetine (CYMBALTA) 30 MG capsule, TAKE 1 CAPSULE BY MOUTH TWICE  DAILY, Disp: 180 capsule, Rfl: 1    ferrous sulfate 324 mg (65 mg iron) TbEC, Take 1 tablet (324 mg total) by mouth once daily., Disp: , Rfl: 0    flash glucose scanning reader (FREESTYLE EFREN 14 DAY READER) Misc, Check blood glucose level 3 times daily., Disp: 1 each, Rfl: 0    flash glucose sensor (FREESTYLE EFREN 14 DAY SENSOR) Kit, 1 application  by Misc.(Non-Drug; Combo Route) route every 14 (fourteen) days. Disp 30 or 90  day refill, Disp: 6 kit, Rfl: 3    gabapentin (NEURONTIN) 300 MG capsule, Take 1 to 2 capsules 3 times daily if needed for leg pain., Disp: 180 capsule, Rfl: 5    HYDROcodone-acetaminophen (NORCO) 5-325 mg per tablet, Take 1 tablet by mouth every 6 (six) hours as needed for Pain. (Patient not taking: Reported on 1/30/2024), Disp: 28 tablet, Rfl: 0    hydrocortisone 2.5 % cream, Apply topically 2 (two) times daily as needed., Disp: 1 each, Rfl: 1    lancets (FREESTYLE LANCETS) 28 gauge Misc, 30 lancets by Misc.(Non-Drug; Combo Route) route Daily., Disp: 30 each, Rfl: 3    levothyroxine (SYNTHROID) 75 MCG tablet, TAKE 1 TABLET(75 MCG) BY MOUTH BEFORE BREAKFAST, Disp: 90 tablet, Rfl: 3    levothyroxine (SYNTHROID) 75 MCG tablet, TAKE 1 TABLET BY MOUTH BEFORE  BREAKFAST, Disp: 90 tablet, Rfl: 3    LIDOcaine (LIDODERM) 5 %, APPLY 1 TO 3 PATCHES TO BACK DAILY. REMOVE WITHIN 12 HOURS, Disp: 30 patch, Rfl: 2    linaGLIPtin (TRADJENTA) 5 mg Tab tablet, TAKE 1 TABLET(5 MG) BY MOUTH EVERY DAY, Disp: 90 tablet, Rfl: 3    loperamide (IMODIUM) 2 mg capsule, Take 1 capsule (2 mg total) by mouth 2 (two) times daily as needed for Diarrhea., Disp: 30 capsule, Rfl: 1    loperamide (IMODIUM) 2 mg capsule, Take 1 capsule (2 mg total) by mouth 4 (four) times daily as needed for Diarrhea., Disp: 10 capsule, Rfl: 0    magnesium 30 mg Tab, Take 30 mg by mouth once. Patient does not know actual dose in MG, Disp: , Rfl:     metFORMIN (GLUCOPHAGE) 500 MG tablet, TAKE 1 TABLET(500 MG) BY MOUTH TWICE DAILY WITH MEALS, Disp: 180 tablet, Rfl: 1    mometasone (ELOCON) 0.1 % ointment, Apply topically 2 (two) times a day. Mix 50/50 with mupirocin ointment. Apply in each nostril twice daily., Disp: 15 g, Rfl: 1    montelukast (SINGULAIR) 10 mg tablet, TAKE 1 TABLET BY MOUTH  DAILY, Disp: 90 tablet, Rfl: 3    ondansetron (ZOFRAN-ODT) 8 MG TbDL, Take 1 tablet (8 mg total) by mouth every 8 (eight) hours as needed (nausea)., Disp: 30 tablet, Rfl: 2     pantoprazole (PROTONIX) 20 MG tablet, TAKE 1 TABLET(20 MG) BY MOUTH TWICE DAILY, Disp: 180 tablet, Rfl: 1    polyethylene glycol (GLYCOLAX) 17 gram PwPk, Take 17 g by mouth once daily. (Patient not taking: Reported on 2024), Disp: 90 each, Rfl: 3    traZODone (DESYREL) 50 MG tablet, TAKE 1 TABLET(50 MG) BY MOUTH EVERY EVENING, Disp: 90 tablet, Rfl: 1    ubrogepant (UBRELVY) 100 mg tablet, Take 1 tablet daily as needed for migraine headache. May take 1 additional tablet 2 hours later if needed., Disp: 16 tablet, Rfl: 2    valACYclovir (VALTREX) 1000 MG tablet, Take 1 tablet (1,000 mg total) by mouth once daily., Disp: 90 tablet, Rfl: 1    Past Surgical History:   Procedure Laterality Date    ADENOIDECTOMY      APPENDECTOMY      BACK SURGERY      BREAST BIOPSY  1970    BREAST CYST EXCISION  ?    BREAST LUMPECTOMY      CARPAL TUNNEL RELEASE      bilateral      SECTION      CHOLECYSTECTOMY  1965    COLECTOMY  2011    Transverse colon resection by Dr. Aguirre    COLONOSCOPY N/A 2017    Procedure: COLONOSCOPY;  Surgeon: Manjit Alvarez MD;  Location: [facility] ([address] [place] [address]);  Service: Endoscopy;  Laterality: N/A;    COLONOSCOPY N/A 2019    Procedure: COLONOSCOPY;  Surgeon: Ramiro Jefferson MD;  Location: [facility] (4TH FLR);  Service: Endoscopy;  Laterality: N/A;  PM prep    COLONOSCOPY N/A 2022    Procedure: COLONOSCOPY;  Surgeon: Ramiro Jefferson MD;  Location: [facility] (2ND FLR);  Service: Endoscopy;  Laterality: N/A;  EGD and colonoscopy in 6-8 weeks from now     states has constipation. -extended Golytely prep    CYSTOSCOPY N/A 2021    Procedure: CYSTOSCOPY;  Surgeon: Viridiana Valenzuela MD;  Location: [address];  Service: Urology;  Laterality: N/A;    ENDOSCOPIC ULTRASOUND OF UPPER GASTROINTESTINAL TRACT N/A 2018    Procedure: ULTRASOUND, UPPER GI TRACT, ENDOSCOPIC;  Surgeon: Jose Hess MD;  Location: [facility];  Service: Endoscopy;  Laterality: N/A;     ENDOSCOPIC ULTRASOUND OF UPPER GASTROINTESTINAL TRACT N/A 01/23/2019    Procedure: ULTRASOUND, UPPER GI TRACT, ENDOSCOPIC;  Surgeon: Jose Hess MD;  Location: [facility] (2ND FLR);  Service: Endoscopy;  Laterality: N/A;    ENDOSCOPIC ULTRASOUND OF UPPER GASTROINTESTINAL TRACT N/A 10/25/2023    Procedure: ULTRASOUND, UPPER GI TRACT, ENDOSCOPIC;  Surgeon: Jose Campos MD;  Location: [facility];  Service: Endoscopy;  Laterality: N/A;  10/20/23: instructions sent via portal-GD    ESOPHAGOGASTRODUODENOSCOPY N/A 11/16/2018    Procedure: EGD (ESOPHAGOGASTRODUODENOSCOPY);  Surgeon: Angelo Reynolds MD;  Location: [facility] (2ND FLR);  Service: Endoscopy;  Laterality: N/A;    ESOPHAGOGASTRODUODENOSCOPY N/A 06/26/2019    Procedure: EGD (ESOPHAGOGASTRODUODENOSCOPY);  Surgeon: Ramiro Jefferson MD;  Location: [facility] (4TH FLR);  Service: Endoscopy;  Laterality: N/A;    ESOPHAGOGASTRODUODENOSCOPY N/A 05/04/2022    Procedure: EGD (ESOPHAGOGASTRODUODENOSCOPY);  Surgeon: Ramiro Jefferson MD;  Location: [facility] (2ND FLR);  Service: Endoscopy;  Laterality: N/A;  fully vaccinated-GT    ESOPHAGOGASTRODUODENOSCOPY N/A 10/25/2023    Procedure: EGD (ESOPHAGOGASTRODUODENOSCOPY);  Surgeon: Jose Campos MD;  Location: [facility];  Service: Endoscopy;  Laterality: N/A;    ESOPHAGOGASTRODUODENOSCOPY N/A 11/15/2023    Procedure: ESOPHAGOGASTRODUODENOSCOPY (EGD);  Surgeon: Db Sanchez MD;  Location: [facility] (4TH FLR);  Service: Endoscopy;  Laterality: N/A;  New diagnosis of esophageal varices. Not a candidate for beta blocker due to soft blood pressures. Recommend EV banding.  PT/INR done on 11/3/23 and CBC done on 9/30/23  ok per Dr. Espinoza to be done by 1st available provider-see tele encounter-st  1    EYE SURGERY      Cataract Removal    FLUOROSCOPIC URODYNAMIC STUDY N/A 11/09/2021    Procedure: URODYNAMIC STUDY, FLUOROSCOPIC;  Surgeon: Viridiana Valenzuela MD;  Location: [facility] OR [address] [place] [address];  Service: Urology;   Laterality: N/A;  90 minutes     HYSTERECTOMY      JOINT REPLACEMENT      OOPHORECTOMY  1970    posterolateral fusion with autograft bone and Eun mineralized bone matrix  02/01/2013    at [facility] for lumbar spine stenosis    SMALL INTESTINE SURGERY      SPINE SURGERY      TOE SURGERY      TONSILLECTOMY      TRANSFORAMINAL EPIDURAL INJECTION OF STEROID Bilateral 12/21/2022    Procedure: Injection,steroid,epidural, Todd L5/S1 TF CONTRAST DIRECT REFERRAL;  Surgeon: Toni Osorio MD;  Location: [facility] PAIN MGT;  Service: Pain Management;  Laterality: Bilateral;    TRIGGER FINGER RELEASE         Family History   Problem Relation Name Age of Onset    Heart disease Father August 50        Mi age 50    Colon cancer Father August     Bladder Cancer Mother Mariana         non smoker    Cataracts Mother Mariana     Glaucoma Mother Mariana     Heart disease Mother Mariana     Hyperlipidemia Mother Mariana     Kidney disease Mother Mariana     Breast cancer Sister Sr. Damaris Dye 79    Arthritis Daughter Wendy     Asthma Daughter Wendy     Depression Daughter Wendy     Hypertension Daughter Wendy     Stroke Daughter Wendy 40    Arthritis Brother Timoteo     Colon cancer Brother Timoteo 70    Alcohol abuse Brother Timoteo     Parkinsonism Brother [place]     Alcohol abuse Brother [place]     Depression Daughter Danielle     Stroke Daughter Danielle     Alcohol abuse Brother Harjeet     Arthritis Brother Harjeet     Asthma Daughter Kimberly     Depression Daughter Kimberly     Celiac disease Neg Hx      Cirrhosis Neg Hx      Colon polyps Neg Hx      Crohn's disease Neg Hx      Cystic fibrosis Neg Hx      Esophageal cancer Neg Hx      Hemochromatosis Neg Hx      Inflammatory bowel disease Neg Hx      Irritable bowel syndrome Neg Hx      Liver cancer Neg Hx      Liver disease Neg Hx      Rectal cancer Neg Hx      Stomach cancer Neg Hx      Ulcerative colitis Neg Hx      Eliazar's disease Neg Hx      Amblyopia Neg Hx      Blindness Neg Hx      Macular  degeneration Neg Hx      Retinal detachment Neg Hx      Strabismus Neg Hx      Melanoma Neg Hx         Social History     Tobacco Use    Smoking status: Never    Smokeless tobacco: Never   Substance Use Topics    Alcohol use: Not Currently     Comment: socially, rarely    Drug use: Never       Immunization History   Administered Date(s) Administered    COVID-19, MRNA, LN-S, PF (Pfizer) (Purple Cap) 01/17/2021, 02/08/2021, 09/13/2021    Hepatitis A / Hepatitis B 12/16/2013, 01/13/2014, 06/16/2014    Hepatitis B (recombinant) Adjuvanted, 2 dose 11/17/2021, 12/21/2021    PPD Test 04/12/2017    Pneumococcal Conjugate - 7 Valent 10/05/2012    Pneumococcal Polysaccharide - 23 Valent 12/03/2010    Tdap 05/04/2015    Zoster 08/04/2010    Zoster Recombinant 11/08/2018, 02/19/2019         Objective:      Vitals:    05/13/24 1048   BP: 122/75   Pulse: 98       Physical Exam  Constitutional:       General: She is not in acute distress.     Appearance: Normal appearance. She is well-developed. She is not ill-appearing.   HENT:      Head: Normocephalic and atraumatic.      Right Ear: Hearing and tympanic membrane normal. There is no impacted cerumen.      Left Ear: Hearing and tympanic membrane normal. There is no impacted cerumen.      Nose: Congestion present.      Comments: Some irritation and dried blood in the right nasal passage.     Mouth/Throat:      Mouth: Mucous membranes are moist.      Pharynx: Oropharynx is clear.   Eyes:      Extraocular Movements: Extraocular movements intact.      Conjunctiva/sclera: Conjunctivae normal.      Pupils: Pupils are equal, round, and reactive to light.   Neck:      Vascular: No carotid bruit.   Cardiovascular:      Rate and Rhythm: Normal rate and regular rhythm.      Heart sounds: Normal heart sounds. No murmur heard.  Pulmonary:      Effort: Pulmonary effort is normal. No respiratory distress.      Breath sounds: Normal breath sounds. No wheezing, rhonchi or rales.   Abdominal:       General: Abdomen is flat. There is no distension.      Palpations: Abdomen is soft. There is no mass.      Tenderness: There is no abdominal tenderness.      Hernia: No hernia is present.   Musculoskeletal:         General: Tenderness (Diffuse areas of ttp, including lumbar, right epicondyle.) present. No swelling or deformity.      Cervical back: No tenderness.      Right lower leg: No edema.      Left lower leg: No edema.   Lymphadenopathy:      Cervical: No cervical adenopathy.   Skin:     General: Skin is warm and dry.      Findings: No lesion or rash.   Neurological:      General: No focal deficit present.      Mental Status: She is alert and oriented to person, place, and time.      Cranial Nerves: No cranial nerve deficit.      Coordination: Coordination normal.      Gait: Gait normal.      Deep Tendon Reflexes: Reflexes normal.   Psychiatric:         Mood and Affect: Mood normal.         Behavior: Behavior normal.         Thought Content: Thought content normal.         Judgment: Judgment normal.         Recent Results (from the past 2016 hour(s))   Urinalysis, Reflex to Urine Culture Urine, Clean Catch    Collection Time: 04/16/24 12:04 PM    Specimen: Urine   Result Value Ref Range    Specimen UA Urine, Clean Catch     Color, UA Yellow Yellow, Straw, Vangie    Appearance, UA Cloudy (A) Clear    pH, UA 5.0 5.0 - 8.0    Specific Gravity, UA 1.005 1.005 - 1.030    Protein, UA Negative Negative    Glucose, UA Negative Negative    Ketones, UA Negative Negative    Bilirubin (UA) Negative Negative    Occult Blood UA Negative Negative    Nitrite, UA Positive (A) Negative    Leukocytes, UA 3+ (A) Negative   Urinalysis Microscopic    Collection Time: 04/16/24 12:04 PM   Result Value Ref Range    RBC, UA 1 0 - 4 /hpf    WBC, UA 83 (H) 0 - 5 /hpf    WBC Clumps, UA Moderate (A) None-Rare    Bacteria Many (A) None-Occ /hpf    Squam Epithel, UA 3 /hpf    Microscopic Comment SEE COMMENT    Urine culture    Collection Time:  04/16/24 12:04 PM    Specimen: Urine   Result Value Ref Range    Urine Culture, Routine KLEBSIELLA PNEUMONIAE  >100,000 cfu/ml   (A)        Susceptibility    Klebsiella pneumoniae - CULTURE, URINE     Amp/Sulbactam <=8/4 Sensitive mcg/mL     Amox/K Clav'ate <=8/4 Sensitive mcg/mL     Ceftriaxone <=1 Sensitive mcg/mL     Cefazolin <=2 Sensitive mcg/mL     Ciprofloxacin <=1 Sensitive mcg/mL     Cefepime <=2 Sensitive mcg/mL     Ertapenem <=0.5 Sensitive mcg/mL     Nitrofurantoin 64 Intermediate mcg/mL     Gentamicin <=4 Sensitive mcg/mL     Levofloxacin <=2 Sensitive mcg/mL     Meropenem <=1 Sensitive mcg/mL     Piperacillin/Tazo <=16 Sensitive mcg/mL     Trimeth/Sulfa <=2/38 Sensitive mcg/mL     Tobramycin <=4 Sensitive mcg/mL   Basic Metabolic Panel    Collection Time: 04/17/24  3:46 PM   Result Value Ref Range    Sodium 133 (L) 136 - 145 mmol/L    Potassium 5.1 3.5 - 5.1 mmol/L    Chloride 101 95 - 110 mmol/L    CO2 23 23 - 29 mmol/L    Glucose 268 (H) 70 - 110 mg/dL    BUN 13 8 - 23 mg/dL    Creatinine 1.1 0.5 - 1.4 mg/dL    Calcium 9.4 8.7 - 10.5 mg/dL    Anion Gap 9 8 - 16 mmol/L    eGFR 50.5 (A) >60 mL/min/1.73 m^2   CBC Auto Differential    Collection Time: 04/17/24  3:46 PM   Result Value Ref Range    WBC 5.60 3.90 - 12.70 K/uL    RBC 3.63 (L) 4.00 - 5.40 M/uL    Hemoglobin 12.7 12.0 - 16.0 g/dL    Hematocrit 37.4 37.0 - 48.5 %     (H) 82 - 98 fL    MCH 35.0 (H) 27.0 - 31.0 pg    MCHC 34.0 32.0 - 36.0 g/dL    RDW 13.8 11.5 - 14.5 %    Platelets 88 (L) 150 - 450 K/uL    MPV 11.1 9.2 - 12.9 fL    Immature Granulocytes 0.5 0.0 - 0.5 %    Gran # (ANC) 3.4 1.8 - 7.7 K/uL    Immature Grans (Abs) 0.03 0.00 - 0.04 K/uL    Lymph # 1.5 1.0 - 4.8 K/uL    Mono # 0.5 0.3 - 1.0 K/uL    Eos # 0.2 0.0 - 0.5 K/uL    Baso # 0.04 0.00 - 0.20 K/uL    nRBC 0 0 /100 WBC    Gran % 60.4 38.0 - 73.0 %    Lymph % 25.9 18.0 - 48.0 %    Mono % 9.6 4.0 - 15.0 %    Eosinophil % 2.9 0.0 - 8.0 %    Basophil % 0.7 0.0 - 1.9 %     Differential Method Automated    Hepatic Function Panel    Collection Time: 04/17/24  3:46 PM   Result Value Ref Range    Total Protein 7.8 6.0 - 8.4 g/dL    Albumin 3.5 3.5 - 5.2 g/dL    Total Bilirubin 0.9 0.1 - 1.0 mg/dL    Bilirubin, Direct 0.3 0.1 - 0.3 mg/dL    AST 46 (H) 10 - 40 U/L    ALT 32 10 - 44 U/L    Alkaline Phosphatase 104 55 - 135 U/L   Protime-INR    Collection Time: 04/17/24  3:46 PM   Result Value Ref Range    Prothrombin Time 10.9 9.0 - 12.5 sec    INR 1.0 0.8 - 1.2   AFP Tumor Marker    Collection Time: 04/17/24  3:46 PM   Result Value Ref Range    AFP 4.0 0.0 - 8.4 ng/mL   Urinalysis, Reflex to Urine Culture Urine, Clean Catch    Collection Time: 04/25/24  1:46 PM    Specimen: Urine   Result Value Ref Range    Specimen UA Urine, Clean Catch     Color, UA Yellow Yellow, Straw, Vangie    Appearance, UA Clear Clear    pH, UA 6.0 5.0 - 8.0    Specific Gravity, UA 1.010 1.005 - 1.030    Protein, UA Negative Negative    Glucose, UA Negative Negative    Ketones, UA Negative Negative    Bilirubin (UA) Negative Negative    Occult Blood UA Negative Negative    Nitrite, UA Negative Negative    Leukocytes, UA Negative Negative          Assessment/Plan:     1) Annual wellness exam  2) Lumbar DJD, Neuropathic pain, Central sensitivity syndrome - Persistent but stable on Cymbalta and gabapentin. Increasing Gabapentin qhs to 600 mg. Stopping am dosing since likely causing some extra daytime fatigue.  3) Constipation, Diarrhea; Recurrent SBO hx; Extensive bowel adhesions  4) GERD - Continuing Protonix 20 daily. Stopping her supplements and vitamins outside of Vitamin D.  5) Rhinosinusitis - Start Allegra nightly. Continuing Singulair, Flonase, saline nasal spray.  6) TMJ - ENT following. Recent botox injections.  7) Insomnia - Increasing nightly gabapentin to 600 mg for purpose of neuropathic leg pain control at night. Stopping Trazodone.   8) T2DM - Higher bg levels at home of late, 200's to 300 at times.  Likely related to diet. A1c when going for next labs. Continuing Metformin 500 bid and Tradjenta 5 daily and monitoring for now with diet focus. Has CGM. Will add A1c when gets next set of labs for specialists. She is a difficult vein stick and prefers to consolidate draws.  9) Recent UTI - Resolved clinically post Cipro course.  10) Hypothyroidism - Continuing Levo 75 qam. TSH with next labs when done for Hepatology or Heme.  11) MDS, Anemia - Stable. Followed with Heme in past but seems to have lost f/u. Given stable CBC, f/u non-urgent at this time.  12) Cirrhosis - Stable. Following with Hepatology.    - Vaccines reviewed. May consider COVID vaccine at local pharmacy.  - Blood pressure normal.    Meds, supplements/vitamins have become difficult to keep track of. With exception of Vitamin D, we are getting rid of all other supplements (eg biotin, iron for now, fish oil). Fatigues during day. Discussed physical activity daily, at least walking some. Stopping am gabapentin and also stopping nightly trazodone.

## 2024-05-17 ENCOUNTER — TELEPHONE (OUTPATIENT)
Dept: INTERNAL MEDICINE | Facility: CLINIC | Age: 79
End: 2024-05-17
Payer: MEDICARE

## 2024-05-17 ENCOUNTER — LAB VISIT (OUTPATIENT)
Dept: LAB | Facility: HOSPITAL | Age: 79
End: 2024-05-17
Attending: INTERNAL MEDICINE
Payer: MEDICARE

## 2024-05-17 ENCOUNTER — PATIENT MESSAGE (OUTPATIENT)
Dept: INTERNAL MEDICINE | Facility: CLINIC | Age: 79
End: 2024-05-17
Payer: MEDICARE

## 2024-05-17 DIAGNOSIS — R35.0 URINARY FREQUENCY: ICD-10-CM

## 2024-05-17 DIAGNOSIS — R35.0 URINARY FREQUENCY: Primary | ICD-10-CM

## 2024-05-17 LAB
BILIRUB UR QL STRIP: NEGATIVE
CLARITY UR REFRACT.AUTO: CLEAR
COLOR UR AUTO: YELLOW
GLUCOSE UR QL STRIP: ABNORMAL
HGB UR QL STRIP: NEGATIVE
HYALINE CASTS UR QL AUTO: 9 /LPF
KETONES UR QL STRIP: NEGATIVE
LEUKOCYTE ESTERASE UR QL STRIP: ABNORMAL
MICROSCOPIC COMMENT: ABNORMAL
NITRITE UR QL STRIP: NEGATIVE
PH UR STRIP: 6 [PH] (ref 5–8)
PROT UR QL STRIP: NEGATIVE
RBC #/AREA URNS AUTO: 0 /HPF (ref 0–4)
SP GR UR STRIP: 1.01 (ref 1–1.03)
SQUAMOUS #/AREA URNS AUTO: 0 /HPF
URN SPEC COLLECT METH UR: ABNORMAL
WBC #/AREA URNS AUTO: 2 /HPF (ref 0–5)

## 2024-05-17 PROCEDURE — 81001 URINALYSIS AUTO W/SCOPE: CPT | Performed by: INTERNAL MEDICINE

## 2024-05-22 ENCOUNTER — PATIENT MESSAGE (OUTPATIENT)
Dept: NEUROLOGY | Facility: CLINIC | Age: 79
End: 2024-05-22
Payer: MEDICARE

## 2024-06-04 RX ORDER — MONTELUKAST SODIUM 10 MG/1
TABLET ORAL
Qty: 90 TABLET | Refills: 3 | Status: SHIPPED | OUTPATIENT
Start: 2024-06-04

## 2024-06-04 NOTE — TELEPHONE ENCOUNTER
Anayeli Judd  is requesting a refill authorization.  Brief Assessment and Rationale for Refill:  Approve     Medication Therapy Plan:         Comments:     Note composed:1:45 PM 06/04/2024

## 2024-06-04 NOTE — TELEPHONE ENCOUNTER
No care due was identified.  [facility] Embedded Care Due Messages. Reference number: [id number].   6/04/2024 1:37:15 PM CDT

## 2024-06-05 ENCOUNTER — TELEPHONE (OUTPATIENT)
Dept: INTERNAL MEDICINE | Facility: CLINIC | Age: 79
End: 2024-06-05
Payer: MEDICARE

## 2024-06-05 DIAGNOSIS — E11.69 TYPE 2 DIABETES MELLITUS WITH OTHER SPECIFIED COMPLICATION, WITHOUT LONG-TERM CURRENT USE OF INSULIN: ICD-10-CM

## 2024-06-05 RX ORDER — METFORMIN HYDROCHLORIDE 1000 MG/1
1000 TABLET ORAL 2 TIMES DAILY WITH MEALS
Qty: 180 TABLET | Refills: 3 | Status: SHIPPED | OUTPATIENT
Start: 2024-06-05

## 2024-06-05 NOTE — TELEPHONE ENCOUNTER
----- Message from Faina Byrne sent at 6/5/2024 12:20 PM CDT -----  Regarding: Pt Concern  Janie Guadalupe called me just now. She said that her blood sugar has been really high recently; it can't get below 230. She did say she had two donuts today and it was 404. But other days it's been high too. She did say she's been eating fruit and maybe that's causing it? However, she said she feels fine minus a sore throat and frequent nose bleeds. She's asked me to contact you and if you can give her a call at your convenience to discuss the problem and possibly have her come in and see you. Her number is [phone].  Thank you.  Faina

## 2024-06-06 NOTE — TELEPHONE ENCOUNTER
Called pt with no answer. Spoke to her healthcare proxy, her daughter Danielle. Obvious dietary component to high bg of late. Discussed 3 options, that we can use all of if needed. 1) Sugar/carb moderation.   2) Increasing metformin to 1000 bid, but will need to watch GI effect.    3) Pre-meal Prandin.          Insulin a last resort for her. Meds like Jardiance not good option given UTI risk. GLP-1 completely out of the question given GI hx.      We will increase Metformin to start and daughter will discuss diet with her.

## 2024-06-20 ENCOUNTER — TELEPHONE (OUTPATIENT)
Dept: INTERNAL MEDICINE | Facility: CLINIC | Age: 79
End: 2024-06-20
Payer: MEDICARE

## 2024-06-20 RX ORDER — AMOXICILLIN 500 MG/1
500 TABLET, FILM COATED ORAL EVERY 8 HOURS
Qty: 21 TABLET | Refills: 0 | Status: SHIPPED | OUTPATIENT
Start: 2024-06-20

## 2024-06-20 NOTE — TELEPHONE ENCOUNTER
----- Message from Blanca Crespo MD sent at 6/20/2024  4:14 PM CDT -----  Regarding: RE: Pt Advice  Perfect. I will prescribe abx  Have them use tylenol for pain- headache and Robitussin and flonase  ----- Message -----  From: Sarahy Clark LPN  Sent: 6/20/2024   4:07 PM CDT  To: Blanca Crespo MD  Subject: FW: Pt Advice                                    Please see B/P below  ----- Message -----  From: Faina Byrne  Sent: 6/20/2024   4:03 PM CDT  To: Darci Guthrie MD; Jerri Bejarano Staff  Subject: Pt Advice                                        Anayeli's /59. Has headache by the eye temple, sinus infection symptoms, bleeding in the right nose. Please give Anayeli a call asap to discuss options for treatment. Thank you.

## 2024-06-20 NOTE — TELEPHONE ENCOUNTER
Called and let patients daughter know that an antibiotic is being called in and to also start taking the Robitussin and Flonase per Dr. Crespo and also Tylenol for pain as needed. He daughter verbalized understanding and will let her mom know .

## 2024-06-20 NOTE — PLAN OF CARE
Problem: Occupational Therapy Goal  Goal: Occupational Therapy Goal  Goals to be met by: 5/1/2017    Patient will increase functional independence with ADLs by performing:    UE Dressing with Stand-by Assistance.  LE Dressing with Moderate Assistance.  Grooming while standing with Set-up Assistance.  Toileting from toilet with Minimal Assistance for hygiene and clothing management.   Bathing from seated with Minimal Assistance.  Toilet transfer to toilet with Supervision.  Outcome: Ongoing (interventions implemented as appropriate)  Patient completed OT evaluation and displays difficulty reaching and bending to complete LB ADLs.  OT to follow patient while in house, and recommend AE as needed. D/C recs: Home with HH OT/PT and family support for 24/7 supervision.       Yes

## 2024-07-11 RX ORDER — MONTELUKAST SODIUM 10 MG/1
10 TABLET ORAL DAILY
Qty: 90 TABLET | Refills: 3 | Status: SHIPPED | OUTPATIENT
Start: 2024-07-11

## 2024-07-11 NOTE — TELEPHONE ENCOUNTER
Care Due:                  Date            Visit Type   Department     Provider  --------------------------------------------------------------------------------                                             [facility]                              PHYSICAL -   HEALTH -                              ESTABLISHED   INTERNAL  Last Visit: 05-      PATIENT      MEDICINE       Darci Guthrie  Next Visit: None Scheduled  None         None Found                                                            Last  Test          Frequency    Reason                     Performed    Due Date  --------------------------------------------------------------------------------    HBA1C.......  6 months...  linaGLIPtin, metFORMIN...  01- 07-    [facility] Embedded Care Due Messages. Reference number: [id number].   7/11/2024 2:56:43 PM CDT

## 2024-07-15 NOTE — TELEPHONE ENCOUNTER
----- Message from Martin Jordan sent at 2/21/2020  9:45 AM CST -----  Contact: Dr Mc's Office  Patient Advice/Staff Message     Caller name: Devendra    Reason for call: Dr Mc wants the pt to be seen for a f/u, the pt has iron deficiency and anemia. Please contact the pt to schedule     Do you feel you need to be seen today:: No        Communication Preference: [phone]    Additional Information:  
New diagnosis of iron deficiency anemia  ~Kimberly    
no history of blood product transfusion

## 2024-07-21 DIAGNOSIS — G47.00 INSOMNIA, UNSPECIFIED TYPE: ICD-10-CM

## 2024-07-22 RX ORDER — TRAZODONE HYDROCHLORIDE 50 MG/1
50 TABLET ORAL NIGHTLY
Qty: 90 TABLET | Refills: 3 | Status: SHIPPED | OUTPATIENT
Start: 2024-07-22

## 2024-07-22 NOTE — TELEPHONE ENCOUNTER
No care due was identified.  [facility] Embedded Care Due Messages. Reference number: [id number].   7/21/2024 7:25:31 PM CDT

## 2024-07-22 NOTE — TELEPHONE ENCOUNTER
Refill Routing Note   Medication(s) are not appropriate for processing by [facility] for the following reason(s):        Drug-disease interaction: traZODone and Hypomagnesemia     [facility] action(s):  Defer             Appointments  past 12m or future 3m with PCP    Date Provider   Last Visit   5/13/2024 Darci Guthrie MD   Next Visit   Visit date not found Darci Guthrie MD   ED visits in past 90 days: 0        Note composed:7:27 PM 07/21/2024

## 2024-07-29 ENCOUNTER — TELEPHONE (OUTPATIENT)
Dept: INTERNAL MEDICINE | Facility: CLINIC | Age: 79
End: 2024-07-29
Payer: MEDICARE

## 2024-07-29 NOTE — TELEPHONE ENCOUNTER
Spoke with patient. She denies any new or worsening abdominal pain and no bright or dark red (blood) that she can see. Patient will monitor her symptoms and let us know if anything changes . gets worse or no improvement . Patient verbalized understanding.

## 2024-07-29 NOTE — TELEPHONE ENCOUNTER
----- Message from Darci Guthrie MD sent at 7/29/2024  1:21 PM CDT -----  Please let Mrs. Guadalupe know that unless having new bad abdominal pain or a lot of blood in the bowel movements, I don't think necessary to go to ED. Okay to monitor and let us know if persistent, in which case we could do some repeat blood work, possibly stool testing.     Emily,  Myles  ----- Message -----  From: Sarahy Clark LPN  Sent: 7/29/2024   1:02 PM CDT  To: Darci Guthrie MD      ----- Message -----  From: India Melgar  Sent: 7/29/2024  12:23 PM CDT  To: Jerri Guo,     Mrs. Guadalupe called for guidance. She has loose, sticky, mucous looking stool. She also believes she may have blood in her stool. She wants to know if she should go to the ED. Please advise.     Thanks for all you do,   India   [facility]

## 2024-08-03 DIAGNOSIS — Z71.89 COMPLEX CARE COORDINATION: ICD-10-CM

## 2024-08-07 ENCOUNTER — TELEPHONE (OUTPATIENT)
Dept: INTERNAL MEDICINE | Facility: CLINIC | Age: 79
End: 2024-08-07
Payer: MEDICARE

## 2024-08-07 DIAGNOSIS — J31.0 RHINITIS, UNSPECIFIED TYPE: ICD-10-CM

## 2024-08-07 DIAGNOSIS — N39.0 URINARY TRACT INFECTION WITHOUT HEMATURIA, SITE UNSPECIFIED: Primary | ICD-10-CM

## 2024-08-07 RX ORDER — AZELASTINE 1 MG/ML
1 SPRAY, METERED NASAL 2 TIMES DAILY
Qty: 30 ML | Refills: 11 | Status: SHIPPED | OUTPATIENT
Start: 2024-08-07

## 2024-08-07 RX ORDER — GRANULES FOR ORAL 3 G/1
3 POWDER ORAL ONCE
Qty: 3 G | Refills: 0 | Status: SHIPPED | OUTPATIENT
Start: 2024-08-07 | End: 2024-08-07

## 2024-08-15 ENCOUNTER — TELEPHONE (OUTPATIENT)
Facility: CLINIC | Age: 79
End: 2024-08-15
Payer: MEDICARE

## 2024-08-15 NOTE — TELEPHONE ENCOUNTER
Pt's daughter was phoned and appt with Dr Espinoza was schld for 10/08.    Oksana    ----- Message from Ruth Vanessa sent at 8/15/2024  3:01 PM CDT -----  Regarding: Scheduling Request  Contact: [phone]  Scheduling Request           Appt Type:  Ep     Date/Time Preference: 11:00am or 1:00pm      Treating Provider: Alexis      Caller Name: pt daughter      Contact Preference: [phone]    Comment: Unable to get available appts, please call to schedule

## 2024-08-17 NOTE — TELEPHONE ENCOUNTER
No care due was identified.  [facility] Embedded Care Due Messages. Reference number: [id number].   8/17/2024 12:02:31 PM CDT

## 2024-08-19 ENCOUNTER — HOSPITAL ENCOUNTER (OUTPATIENT)
Dept: RADIOLOGY | Facility: HOSPITAL | Age: 79
Discharge: HOME | End: 2024-08-19
Attending: INTERNAL MEDICINE
Payer: MEDICARE

## 2024-08-19 DIAGNOSIS — Z12.31 ENCOUNTER FOR SCREENING MAMMOGRAM FOR BREAST CANCER: ICD-10-CM

## 2024-08-19 PROCEDURE — 77063 BREAST TOMOSYNTHESIS BI: CPT | Mod: TC

## 2024-08-19 PROCEDURE — 77067 SCR MAMMO BI INCL CAD: CPT | Mod: TC

## 2024-08-19 PROCEDURE — 77067 SCR MAMMO BI INCL CAD: CPT | Mod: 26,,, | Performed by: RADIOLOGY

## 2024-08-19 PROCEDURE — 77063 BREAST TOMOSYNTHESIS BI: CPT | Mod: 26,,, | Performed by: RADIOLOGY

## 2024-08-20 RX ORDER — DULOXETIN HYDROCHLORIDE 30 MG/1
30 CAPSULE, DELAYED RELEASE ORAL 2 TIMES DAILY
Qty: 180 CAPSULE | Refills: 3 | Status: SHIPPED | OUTPATIENT
Start: 2024-08-20

## 2024-08-20 RX ORDER — DULOXETIN HYDROCHLORIDE 30 MG/1
CAPSULE, DELAYED RELEASE ORAL
Qty: 180 CAPSULE | Refills: 3 | OUTPATIENT
Start: 2024-08-20

## 2024-08-20 NOTE — TELEPHONE ENCOUNTER
No care due was identified.  [facility] Embedded Care Due Messages. Reference number: [id number].   8/20/2024 3:39:31 AM CDT

## 2024-08-20 NOTE — TELEPHONE ENCOUNTER
Refill Decision Note   Anayeli Judd  is requesting a refill authorization.  Brief Assessment and Rationale for Refill:  Quick Discontinue     Medication Therapy Plan:         Comments:     Note composed:5:16 PM 08/20/2024

## 2024-09-02 DIAGNOSIS — E11.9 TYPE 2 DIABETES MELLITUS WITHOUT COMPLICATION, WITHOUT LONG-TERM CURRENT USE OF INSULIN: ICD-10-CM

## 2024-09-02 RX ORDER — FLASH GLUCOSE SENSOR
KIT MISCELLANEOUS
Qty: 6 KIT | Refills: 3 | Status: SHIPPED | OUTPATIENT
Start: 2024-09-02

## 2024-09-17 DIAGNOSIS — M54.9 BACK PAIN, UNSPECIFIED BACK LOCATION, UNSPECIFIED BACK PAIN LATERALITY, UNSPECIFIED CHRONICITY: ICD-10-CM

## 2024-09-17 RX ORDER — LIDOCAINE 50 MG/G
PATCH TOPICAL
Qty: 30 PATCH | Refills: 2 | Status: SHIPPED | OUTPATIENT
Start: 2024-09-17

## 2024-09-17 NOTE — TELEPHONE ENCOUNTER
Care Due:                  Date            Visit Type   Department     Provider  --------------------------------------------------------------------------------                                             [facility]                              PHYSICAL -   HEALTH -                              ESTABLISHED   INTERNAL  Last Visit: 05-      PATIENT      MEDICINE       Darci Guthrie  Next Visit: None Scheduled  None         None Found                                                            Last  Test          Frequency    Reason                     Performed    Due Date  --------------------------------------------------------------------------------    HBA1C.......  6 months...  linaGLIPtin, metFORMIN...  01- 07-    [facility] Embedded Care Due Messages. Reference number: [id number].   9/17/2024 2:15:58 PM CDT

## 2024-09-17 NOTE — TELEPHONE ENCOUNTER
Refill Routing Note   Medication(s) are not appropriate for processing by [facility] for the following reason(s):        Outside of protocol    [facility] action(s):  Route     Requires labs : Yes             Appointments  past 12m or future 3m with PCP    Date Provider   Last Visit   5/13/2024 Darci Guthrie MD   Next Visit   Visit date not found Darci Guthrie MD   ED visits in past 90 days: 0        Note composed:3:02 PM 09/17/2024

## 2024-09-25 DIAGNOSIS — Z00.00 ENCOUNTER FOR MEDICARE ANNUAL WELLNESS EXAM: ICD-10-CM

## 2024-10-02 ENCOUNTER — TELEPHONE (OUTPATIENT)
Dept: INTERNAL MEDICINE | Facility: CLINIC | Age: 79
End: 2024-10-02
Payer: MEDICARE

## 2024-10-02 DIAGNOSIS — R14.0 DISTENDED ABDOMEN: Primary | ICD-10-CM

## 2024-10-02 NOTE — TELEPHONE ENCOUNTER
Clara can you put June Tucei   230pm   Plain xrays across for 130/2 pm   Please call the daughter or yun [phone]

## 2024-10-03 ENCOUNTER — OFFICE VISIT (OUTPATIENT)
Dept: INTERNAL MEDICINE | Facility: CLINIC | Age: 79
End: 2024-10-03
Payer: MEDICARE

## 2024-10-03 ENCOUNTER — HOSPITAL ENCOUNTER (OUTPATIENT)
Dept: RADIOLOGY | Facility: HOSPITAL | Age: 79
Discharge: HOME | End: 2024-10-03
Attending: INTERNAL MEDICINE
Payer: MEDICARE

## 2024-10-03 DIAGNOSIS — N39.0 URINARY TRACT INFECTION WITHOUT HEMATURIA, SITE UNSPECIFIED: Primary | Chronic | ICD-10-CM

## 2024-10-03 DIAGNOSIS — R14.0 DISTENDED ABDOMEN: ICD-10-CM

## 2024-10-03 DIAGNOSIS — R82.90 BAD ODOR OF URINE: ICD-10-CM

## 2024-10-03 LAB
BACTERIA #/AREA URNS AUTO: ABNORMAL /HPF
BILIRUB UR QL STRIP: NEGATIVE
CLARITY UR REFRACT.AUTO: ABNORMAL
COLOR UR AUTO: YELLOW
GLUCOSE UR QL STRIP: NEGATIVE
HGB UR QL STRIP: ABNORMAL
HYALINE CASTS UR QL AUTO: 0 /LPF
KETONES UR QL STRIP: ABNORMAL
LEUKOCYTE ESTERASE UR QL STRIP: ABNORMAL
MICROSCOPIC COMMENT: ABNORMAL
NITRITE UR QL STRIP: NEGATIVE
PH UR STRIP: 6 [PH] (ref 5–8)
PROT UR QL STRIP: ABNORMAL
RBC #/AREA URNS AUTO: 13 /HPF (ref 0–4)
SP GR UR STRIP: 1.02 (ref 1–1.03)
SQUAMOUS #/AREA URNS AUTO: 12 /HPF
URN SPEC COLLECT METH UR: ABNORMAL
WBC #/AREA URNS AUTO: >100 /HPF (ref 0–5)
WBC CLUMPS UR QL AUTO: ABNORMAL

## 2024-10-03 PROCEDURE — 74019 RADEX ABDOMEN 2 VIEWS: CPT | Mod: TC

## 2024-10-03 PROCEDURE — 87186 SC STD MICRODIL/AGAR DIL: CPT | Performed by: INTERNAL MEDICINE

## 2024-10-03 PROCEDURE — 87086 URINE CULTURE/COLONY COUNT: CPT | Performed by: INTERNAL MEDICINE

## 2024-10-03 PROCEDURE — 87088 URINE BACTERIA CULTURE: CPT | Performed by: INTERNAL MEDICINE

## 2024-10-03 PROCEDURE — 74019 RADEX ABDOMEN 2 VIEWS: CPT | Mod: 26,,, | Performed by: RADIOLOGY

## 2024-10-03 PROCEDURE — 99213 OFFICE O/P EST LOW 20 MIN: CPT | Mod: S$PBB,,, | Performed by: INTERNAL MEDICINE

## 2024-10-03 PROCEDURE — 81001 URINALYSIS AUTO W/SCOPE: CPT | Performed by: INTERNAL MEDICINE

## 2024-10-03 RX ORDER — CEPHALEXIN 500 MG/1
500 CAPSULE ORAL 2 TIMES DAILY
Qty: 14 CAPSULE | Refills: 0 | Status: SHIPPED | OUTPATIENT
Start: 2024-10-03 | End: 2024-10-07

## 2024-10-04 ENCOUNTER — PATIENT MESSAGE (OUTPATIENT)
Dept: INTERNAL MEDICINE | Facility: CLINIC | Age: 79
End: 2024-10-04
Payer: MEDICARE

## 2024-10-06 LAB — BACTERIA UR CULT: ABNORMAL

## 2024-10-07 ENCOUNTER — TELEPHONE (OUTPATIENT)
Dept: INTERNAL MEDICINE | Facility: CLINIC | Age: 79
End: 2024-10-07
Payer: MEDICARE

## 2024-10-07 ENCOUNTER — TELEPHONE (OUTPATIENT)
Dept: HEPATOLOGY | Facility: CLINIC | Age: 79
End: 2024-10-07
Payer: MEDICARE

## 2024-10-07 DIAGNOSIS — E11.9 TYPE 2 DIABETES MELLITUS WITHOUT COMPLICATION, WITHOUT LONG-TERM CURRENT USE OF INSULIN: ICD-10-CM

## 2024-10-07 DIAGNOSIS — N39.0 URINARY TRACT INFECTION WITHOUT HEMATURIA, SITE UNSPECIFIED: Primary | ICD-10-CM

## 2024-10-07 DIAGNOSIS — K76.9 LIVER DISEASE, UNSPECIFIED: ICD-10-CM

## 2024-10-07 DIAGNOSIS — K74.69 OTHER CIRRHOSIS OF LIVER: ICD-10-CM

## 2024-10-07 DIAGNOSIS — I85.10 SECONDARY ESOPHAGEAL VARICES WITHOUT BLEEDING: Primary | ICD-10-CM

## 2024-10-07 DIAGNOSIS — D46.9 MYELODYSPLASTIC SYNDROME: ICD-10-CM

## 2024-10-07 RX ORDER — CIPROFLOXACIN 250 MG/1
250 TABLET, FILM COATED ORAL 2 TIMES DAILY
Qty: 14 TABLET | Refills: 0 | Status: SHIPPED | OUTPATIENT
Start: 2024-10-07 | End: 2024-10-14

## 2024-10-07 NOTE — TELEPHONE ENCOUNTER
Pt was called US and  labs were schld with her for tomorrow 10/08.    Oksana    ----- Message from Jaqueline Espinoza MD sent at 10/7/2024  3:12 PM CDT -----  Regarding: RE: Appt on tomorrow  Contact: Anayeli  Orders for lab and ultrasound have been placed. Thank you.  ----- Message -----  From: Oksana Tejeda MA  Sent: 10/7/2024   2:21 PM CDT  To: Jaqueline Espinoza MD  Subject: FW: Appt on tomorrow                             Pt would like to know if labs will be needed?  ----- Message -----  From: Alexandrea Galvan  Sent: 10/7/2024  11:52 AM CDT  To: Alexis Parsons Staff  Subject: Appt on tomorrow                                 Consult/Advisory     Name Of Caller:Anayeli Judd          Contact Preference:[phone] (home)        Nature of call:Pt is calling to see if she has any labs to do on tomorrow, if so what type, can she come in early, and also want to know if they can be linked to Dr Jerri lang. Requesting a call back.

## 2024-10-07 NOTE — PROGRESS NOTES
Subjective:       Patient ID: Anayeli Judd is a 79 y.o. female.    Chief Complaint: Abdominal pain, Back pain    HPI:  Patient stopped by this afternoon after an abdominal x-ray ordered by my partner yesterday evening. Pt had informed her of ongoing abdominal pain. None today but has had bm's recently, though small typically. Last yesterday. Hx of recurrent sbo. Also notes ongoing lbp. Hx of lumbar djd known to be an issue chronically and has been difficult to control without using medication such as gabapentin that effects cognition. Does have hx of recurrent UTI. Admits to some odor at times and has had difficulty urinating recently.      Past Medical History:   Diagnosis Date    Abdominal pain     Allergic rhinitis     Arthritis     Blood platelet disorder     Blood platelet disorder     Blood transfusion     during delivery and     Bowel obstruction     Cervical radiculopathy     followed by dr cloud    Cirrhosis of liver     Colon cancer     transverse colon; resected; Stage IIA (pT3 pN0 MX)    Degenerative joint disease (DJD) of lumbar spine     Diabetes mellitus     Diarrhea     Falls 2020    Family history of breast cancer     Family history of colon cancer     Fatty liver     GERD (gastroesophageal reflux disease)     History of shingles     Hyperlipidemia     Hypomagnesemia     Hypothyroidism     Irritable bowel syndrome     Microscopic colitis     treated     Migraine     Myelodysplastic syndrome     Raynaud phenomenon     Raynaud's disease     Squamous cell carcinoma of skin     Type 2 diabetes mellitus     Usual hyperplasia of lactiferous duct          Current Outpatient Medications:     acetaminophen (TYLENOL) 650 MG TbSR, Take 1,300 mg by mouth 2 (two) times a day., Disp: , Rfl:     ascorbic acid, vitamin C, (VITAMIN C) 1000 MG tablet, Take 1,000 mg by mouth once daily., Disp: , Rfl:     atorvastatin (LIPITOR) 40 MG tablet, Take 40 mg by mouth once daily., Disp: , Rfl:      azelastine (ASTELIN) 137 mcg (0.1 %) nasal spray, 1 spray (137 mcg total) by Nasal route 2 (two) times daily., Disp: 30 mL, Rfl: 11    biotin 10,000 mcg TbDL, Take 1 tablet by mouth once daily. , Disp: , Rfl:     blood sugar diagnostic (FREESTYLE LITE STRIPS) Strp, 1 strip by Misc.(Non-Drug; Combo Route) route 3 (three) times daily., Disp: 200 strip, Rfl: 5    blood-glucose meter kit, Use as instructed, Disp: 1 each, Rfl: 0    calcium-vitamin D3 (OS-KASSANDRA 500 + D3) 500 mg-5 mcg (200 unit) per tablet, Take 1 tablet by mouth once daily., Disp: , Rfl:     cephALEXin (KEFLEX) 500 MG capsule, Take 1 capsule (500 mg total) by mouth 2 (two) times a day. for 7 days, Disp: 14 capsule, Rfl: 0    cholestyramine (QUESTRAN) 4 gram packet, Take 1 packet (4 g total) by mouth 3 (three) times daily with meals., Disp: 270 packet, Rfl: 3    conjugated estrogens (PREMARIN) vaginal cream, INSERT 0.5 GRAM VAGINALLY 2 TIMES A WEEK, Disp: 30 g, Rfl: 3    cranberry extract 200 mg Cap, Take 1 capsule by mouth once daily., Disp: , Rfl:     diphenoxylate-atropine 2.5-0.025 mg (LOMOTIL) 2.5-0.025 mg per tablet, Take 1 tablet by mouth 3 (three) times daily as needed for Diarrhea., Disp: 30 tablet, Rfl: 0    donepeziL (ARICEPT) 10 MG tablet, TAKE 1 TABLET(10 MG) BY MOUTH EVERY EVENING, Disp: 90 tablet, Rfl: 3    DULoxetine (CYMBALTA) 30 MG capsule, Take 1 capsule (30 mg total) by mouth 2 (two) times daily., Disp: 180 capsule, Rfl: 3    ferrous sulfate 324 mg (65 mg iron) TbEC, Take 1 tablet (324 mg total) by mouth once daily., Disp: , Rfl: 0    flash glucose scanning reader (FREESTYLE EFREN 14 DAY READER) Misc, Check blood glucose level 3 times daily., Disp: 1 each, Rfl: 0    flash glucose sensor (FREESTYLE EFREN 14 DAY SENSOR) Kit, APPLY 1 SENSOR EVERY 14 DAYS, Disp: 6 kit, Rfl: 3    gabapentin (NEURONTIN) 300 MG capsule, Take 1 to 2 capsules 3 times daily if needed for leg pain., Disp: 180 capsule, Rfl: 5    HYDROcodone-acetaminophen (NORCO)  5-325 mg per tablet, Take 1 tablet by mouth every 6 (six) hours as needed for Pain. (Patient not taking: Reported on 1/30/2024), Disp: 28 tablet, Rfl: 0    hydrocortisone 2.5 % cream, Apply topically 2 (two) times daily as needed., Disp: 1 each, Rfl: 1    lancets (FREESTYLE LANCETS) 28 gauge Misc, 30 lancets by Misc.(Non-Drug; Combo Route) route Daily., Disp: 30 each, Rfl: 3    levothyroxine (SYNTHROID) 75 MCG tablet, TAKE 1 TABLET(75 MCG) BY MOUTH BEFORE BREAKFAST, Disp: 90 tablet, Rfl: 3    levothyroxine (SYNTHROID) 75 MCG tablet, TAKE 1 TABLET BY MOUTH BEFORE  BREAKFAST, Disp: 90 tablet, Rfl: 3    LIDOcaine (LIDODERM) 5 %, APPLY 1 TO 3 PATCHES TO BACK EVERY DAY. REMOVE WITHIN 12 HOURS, Disp: 30 patch, Rfl: 2    linaGLIPtin (TRADJENTA) 5 mg Tab tablet, TAKE 1 TABLET(5 MG) BY MOUTH EVERY DAY, Disp: 90 tablet, Rfl: 3    loperamide (IMODIUM) 2 mg capsule, Take 1 capsule (2 mg total) by mouth 2 (two) times daily as needed for Diarrhea., Disp: 30 capsule, Rfl: 1    loperamide (IMODIUM) 2 mg capsule, Take 1 capsule (2 mg total) by mouth 4 (four) times daily as needed for Diarrhea., Disp: 10 capsule, Rfl: 0    magnesium 30 mg Tab, Take 30 mg by mouth once. Patient does not know actual dose in MG, Disp: , Rfl:     metFORMIN (GLUCOPHAGE) 1000 MG tablet, Take 1 tablet (1,000 mg total) by mouth 2 (two) times daily with meals., Disp: 180 tablet, Rfl: 3    mometasone (ELOCON) 0.1 % ointment, Apply topically 2 (two) times a day. Mix 50/50 with mupirocin ointment. Apply in each nostril twice daily., Disp: 15 g, Rfl: 1    montelukast (SINGULAIR) 10 mg tablet, Take 1 tablet (10 mg total) by mouth once daily., Disp: 90 tablet, Rfl: 3    ondansetron (ZOFRAN-ODT) 8 MG TbDL, Take 1 tablet (8 mg total) by mouth every 8 (eight) hours as needed (nausea)., Disp: 30 tablet, Rfl: 2    pantoprazole (PROTONIX) 20 MG tablet, TAKE 1 TABLET(20 MG) BY MOUTH TWICE DAILY, Disp: 180 tablet, Rfl: 1    polyethylene glycol (GLYCOLAX) 17 gram PwPk, Take  17 g by mouth once daily. (Patient not taking: Reported on 2024), Disp: 90 each, Rfl: 3    traZODone (DESYREL) 50 MG tablet, TAKE 1 TABLET(50 MG) BY MOUTH EVERY EVENING, Disp: 90 tablet, Rfl: 3    ubrogepant (UBRELVY) 100 mg tablet, Take 1 tablet daily as needed for migraine headache. May take 1 additional tablet 2 hours later if needed., Disp: 16 tablet, Rfl: 2    valACYclovir (VALTREX) 1000 MG tablet, Take 1 tablet (1,000 mg total) by mouth once daily., Disp: 90 tablet, Rfl: 1    Past Surgical History:   Procedure Laterality Date    ADENOIDECTOMY      APPENDECTOMY      BACK SURGERY      BREAST BIOPSY  1970    BREAST CYST EXCISION  ?    BREAST LUMPECTOMY      CARPAL TUNNEL RELEASE      bilateral      SECTION      CHOLECYSTECTOMY  1965    COLECTOMY  2011    Transverse colon resection by Dr. Aguirre    COLONOSCOPY N/A 2017    Procedure: COLONOSCOPY;  Surgeon: Manjit Alvarez MD;  Location: [facility] (4TH FLR);  Service: Endoscopy;  Laterality: N/A;    COLONOSCOPY N/A 2019    Procedure: COLONOSCOPY;  Surgeon: Ramiro Jefferson MD;  Location: [facility] (4TH FLR);  Service: Endoscopy;  Laterality: N/A;  PM prep    COLONOSCOPY N/A 2022    Procedure: COLONOSCOPY;  Surgeon: Ramiro Jefferson MD;  Location: [facility] (2ND FLR);  Service: Endoscopy;  Laterality: N/A;  EGD and colonoscopy in 6-8 weeks from now     states has constipation. -extended Golytely prep    CYSTOSCOPY N/A 2021    Procedure: CYSTOSCOPY;  Surgeon: Viridiaan Valenzuela MD;  [address]  Service: Urology;  Laterality: N/A;    ENDOSCOPIC ULTRASOUND OF UPPER GASTROINTESTINAL TRACT N/A 2018    Procedure: ULTRASOUND, UPPER GI TRACT, ENDOSCOPIC;  Surgeon: Jose Hess MD;  Location: [facility];  Service: Endoscopy;  Laterality: N/A;    ENDOSCOPIC ULTRASOUND OF UPPER GASTROINTESTINAL TRACT N/A 2019    Procedure: ULTRASOUND, UPPER GI TRACT, ENDOSCOPIC;  Surgeon: Jose Hess MD;   Location: [facility] (2ND FLR);  Service: Endoscopy;  Laterality: N/A;    ENDOSCOPIC ULTRASOUND OF UPPER GASTROINTESTINAL TRACT N/A 10/25/2023    Procedure: ULTRASOUND, UPPER GI TRACT, ENDOSCOPIC;  Surgeon: Jose Campos MD;  Location: [facility];  Service: Endoscopy;  Laterality: N/A;  10/20/23: instructions sent via portal-GD    ESOPHAGOGASTRODUODENOSCOPY N/A 11/16/2018    Procedure: EGD (ESOPHAGOGASTRODUODENOSCOPY);  Surgeon: Angelo Reynolds MD;  Location: [facility] (2ND FLR);  Service: Endoscopy;  Laterality: N/A;    ESOPHAGOGASTRODUODENOSCOPY N/A 06/26/2019    Procedure: EGD (ESOPHAGOGASTRODUODENOSCOPY);  Surgeon: Ramiro Jefferson MD;  Location: [facility] (4TH FLR);  Service: Endoscopy;  Laterality: N/A;    ESOPHAGOGASTRODUODENOSCOPY N/A 05/04/2022    Procedure: EGD (ESOPHAGOGASTRODUODENOSCOPY);  Surgeon: Ramiro Jefferson MD;  Location: [facility] (2ND FLR);  Service: Endoscopy;  Laterality: N/A;  fully vaccinated-GT    ESOPHAGOGASTRODUODENOSCOPY N/A 10/25/2023    Procedure: EGD (ESOPHAGOGASTRODUODENOSCOPY);  Surgeon: Jose Campos MD;  Location: [facility];  Service: Endoscopy;  Laterality: N/A;    ESOPHAGOGASTRODUODENOSCOPY N/A 11/15/2023    Procedure: ESOPHAGOGASTRODUODENOSCOPY (EGD);  Surgeon: Db Sanchez MD;  Location: [facility] (4TH FLR);  Service: Endoscopy;  Laterality: N/A;  New diagnosis of esophageal varices. Not a candidate for beta blocker due to soft blood pressures. Recommend EV banding.  PT/INR done on 11/3/23 and CBC done on 9/30/23  ok per Dr. Espinoza to be done by 1st available provider-see tele encounter-st  1    EYE SURGERY      Cataract Removal    FLUOROSCOPIC URODYNAMIC STUDY N/A 11/09/2021    Procedure: URODYNAMIC STUDY, FLUOROSCOPIC;  Surgeon: Viridiana Valenzuela MD;  Location: [facility] OR [address] [place] [address];  Service: Urology;  Laterality: N/A;  90 minutes     HYSTERECTOMY      JOINT REPLACEMENT      OOPHORECTOMY  1970    posterolateral fusion with autograft bone and Eun mineralized  bone matrix  02/01/2013    at [facility] for lumbar spine stenosis    SMALL INTESTINE SURGERY      SPINE SURGERY      TOE SURGERY      TONSILLECTOMY      TRANSFORAMINAL EPIDURAL INJECTION OF STEROID Bilateral 12/21/2022    Procedure: Injection,steroid,epidural, Todd L5/S1 TF CONTRAST DIRECT REFERRAL;  Surgeon: Toni Osorio MD;  Location: [facility] PAIN MGT;  Service: Pain Management;  Laterality: Bilateral;    TRIGGER FINGER RELEASE         Social History     Tobacco Use    Smoking status: Never    Smokeless tobacco: Never   Substance Use Topics    Alcohol use: Not Currently     Comment: socially, rarely    Drug use: Never         Objective:          Physical Exam  Constitutional:       General: She is not in acute distress.     Appearance: Normal appearance. She is not ill-appearing.   Pulmonary:      Effort: Pulmonary effort is normal. No respiratory distress.   Abdominal:      General: Abdomen is flat. There is no distension.      Palpations: Abdomen is soft.      Tenderness: There is abdominal tenderness (Mild with deep palpation in areas, including suprabupic.). There is no right CVA tenderness, left CVA tenderness, guarding or rebound.   Musculoskeletal:      Right lower leg: No edema.      Left lower leg: No edema.   Neurological:      Mental Status: She is alert and oriented to person, place, and time. Mental status is at baseline.   Psychiatric:         Mood and Affect: Mood normal.         Behavior: Behavior normal.         Thought Content: Thought content normal.         Judgment: Judgment normal.              Assessment/Plan:     1) Abdominal pain, UTI, Constipation - No acute abdomen. Doubt significant sbo recurrence but constipation still certainly ongoing issue. Try Mag citrate today which has been helpful in past. Prelim UA is positive for many wbc's. Starting Keflex and will f/u urine cultures.

## 2024-10-08 ENCOUNTER — OFFICE VISIT (OUTPATIENT)
Dept: HEPATOLOGY | Facility: CLINIC | Age: 79
End: 2024-10-08
Payer: MEDICARE

## 2024-10-08 ENCOUNTER — HOSPITAL ENCOUNTER (OUTPATIENT)
Dept: RADIOLOGY | Facility: HOSPITAL | Age: 79
Discharge: HOME | End: 2024-10-08
Attending: INTERNAL MEDICINE
Payer: MEDICARE

## 2024-10-08 VITALS
DIASTOLIC BLOOD PRESSURE: 64 MMHG | SYSTOLIC BLOOD PRESSURE: 134 MMHG | HEART RATE: 74 BPM | TEMPERATURE: 97 F | RESPIRATION RATE: 18 BRPM | BODY MASS INDEX: 24.18 KG/M2 | HEIGHT: 62 IN | OXYGEN SATURATION: 99 % | WEIGHT: 131.38 LBS

## 2024-10-08 DIAGNOSIS — F10.90 METALD: ICD-10-CM

## 2024-10-08 DIAGNOSIS — K76.9 LIVER DISEASE, UNSPECIFIED: ICD-10-CM

## 2024-10-08 DIAGNOSIS — I85.10 SECONDARY ESOPHAGEAL VARICES WITHOUT BLEEDING: ICD-10-CM

## 2024-10-08 DIAGNOSIS — K76.0 METALD: ICD-10-CM

## 2024-10-08 DIAGNOSIS — K72.91 HEPATIC COMA/ENCEPHALOPATHY: ICD-10-CM

## 2024-10-08 DIAGNOSIS — K76.6 PORTAL HYPERTENSION: ICD-10-CM

## 2024-10-08 DIAGNOSIS — K74.69 COMPENSATED LIVER DISEASE: ICD-10-CM

## 2024-10-08 DIAGNOSIS — R10.2 VAGINAL PAIN: ICD-10-CM

## 2024-10-08 DIAGNOSIS — R41.89 BRAIN FOG: ICD-10-CM

## 2024-10-08 PROCEDURE — 76705 ECHO EXAM OF ABDOMEN: CPT | Mod: TC

## 2024-10-08 PROCEDURE — 99999 PR PBB SHADOW E&M-EST. PATIENT-LVL V: CPT | Mod: PBBFAC,,, | Performed by: INTERNAL MEDICINE

## 2024-10-08 PROCEDURE — 76705 ECHO EXAM OF ABDOMEN: CPT | Mod: 26,,, | Performed by: RADIOLOGY

## 2024-10-08 PROCEDURE — 99214 OFFICE O/P EST MOD 30 MIN: CPT | Mod: S$PBB,,, | Performed by: INTERNAL MEDICINE

## 2024-10-08 PROCEDURE — 99215 OFFICE O/P EST HI 40 MIN: CPT | Mod: PBBFAC,25 | Performed by: INTERNAL MEDICINE

## 2024-10-08 RX ORDER — PREGABALIN 100 MG/1
100 CAPSULE ORAL
COMMUNITY

## 2024-10-08 RX ORDER — SYRING-NEEDL,DISP,INSUL,0.3 ML 29 G X1/2"
300 SYRINGE, EMPTY DISPOSABLE MISCELLANEOUS
COMMUNITY

## 2024-10-08 RX ORDER — GRANULES FOR ORAL 3 G/1
POWDER ORAL
COMMUNITY
Start: 2024-08-07

## 2024-10-08 RX ORDER — TRIAMCINOLONE ACETONIDE 1 MG/G
PASTE DENTAL 3 TIMES DAILY
COMMUNITY
Start: 2024-07-12

## 2024-10-08 NOTE — PROGRESS NOTES
Hepatology Follow Up Note    Referring provider: No ref. provider found  PCP: Darci Guthrie MD    Chief complaint: follow up compensated Met-ALD cirrhosis     Last seen in clinic on: 2024    Brief HPI:  Anayeli Judd is a 79 y.o. female with compensated Met-ALD cirrhosis complicated by non-bleeding esophageal varices, DM Type 2, HLD, who presents for scheduled follow up.    Interval Events:  - Feels ok. Accompanied by daughter.  - Reports brain fog, slowed thought process, somnolence.  - Notes chronic, severe vaginal pain.  - The patient denies abdominal distension, jaundice, gross GI bleeding.  - Denies recent hospitalizations, ED visits.  - This is the extent of the patient's complaints at this time.      Past Medical History:   Diagnosis Date    Abdominal pain     Allergic rhinitis     Arthritis     Blood platelet disorder     Blood platelet disorder     Blood transfusion     during delivery and     Bowel obstruction     Cervical radiculopathy     followed by dr cloud    Cirrhosis of liver     Colon cancer     transverse colon; resected; Stage IIA (pT3 pN0 MX)    Degenerative joint disease (DJD) of lumbar spine     Diabetes mellitus     Diarrhea     Falls 2020    Family history of breast cancer     Family history of colon cancer     Fatty liver     GERD (gastroesophageal reflux disease)     History of shingles     Hyperlipidemia     Hypomagnesemia     Hypothyroidism     Irritable bowel syndrome     Microscopic colitis     treated     Migraine     Myelodysplastic syndrome     Raynaud phenomenon     Raynaud's disease     Squamous cell carcinoma of skin     Type 2 diabetes mellitus     Usual hyperplasia of lactiferous duct 1970       Past Surgical History:   Procedure Laterality Date    ADENOIDECTOMY      APPENDECTOMY      BACK SURGERY      BREAST BIOPSY  1970    BREAST CYST EXCISION  ?    BREAST LUMPECTOMY  1970    CARPAL TUNNEL RELEASE      bilateral      SECTION       CHOLECYSTECTOMY  1965    COLECTOMY  06/27/2011    Transverse colon resection by Dr. Aguirre    COLONOSCOPY N/A 07/27/2017    Procedure: COLONOSCOPY;  Surgeon: Manjit Alvarez MD;  Location: [facility] (4TH FLR);  Service: Endoscopy;  Laterality: N/A;    COLONOSCOPY N/A 06/26/2019    Procedure: COLONOSCOPY;  Surgeon: Ramiro Jefferson MD;  Location: [facility] (4TH FLR);  Service: Endoscopy;  Laterality: N/A;  PM prep    COLONOSCOPY N/A 05/04/2022    Procedure: COLONOSCOPY;  Surgeon: Ramiro Jefferson MD;  Location: [facility] (2ND FLR);  Service: Endoscopy;  Laterality: N/A;  EGD and colonoscopy in 6-8 weeks from now     states has constipation. -extended Golytely prep    CYSTOSCOPY N/A 11/09/2021    Procedure: CYSTOSCOPY;  Surgeon: Viridiana Valenzuela MD;  Location: [facility] 1ST FLR;  Service: Urology;  Laterality: N/A;    ENDOSCOPIC ULTRASOUND OF UPPER GASTROINTESTINAL TRACT N/A 12/12/2018    Procedure: ULTRASOUND, UPPER GI TRACT, ENDOSCOPIC;  Surgeon: Jose Hess MD;  Location: [facility];  Service: Endoscopy;  Laterality: N/A;    ENDOSCOPIC ULTRASOUND OF UPPER GASTROINTESTINAL TRACT N/A 01/23/2019    Procedure: ULTRASOUND, UPPER GI TRACT, ENDOSCOPIC;  Surgeon: Jose Hess MD;  Location: [facility] (2ND FLR);  Service: Endoscopy;  Laterality: N/A;    ENDOSCOPIC ULTRASOUND OF UPPER GASTROINTESTINAL TRACT N/A 10/25/2023    Procedure: ULTRASOUND, UPPER GI TRACT, ENDOSCOPIC;  Surgeon: Jose Campos MD;  Location: [facility];  Service: Endoscopy;  Laterality: N/A;  10/20/23: instructions sent via portal-GD    ESOPHAGOGASTRODUODENOSCOPY N/A 11/16/2018    Procedure: EGD (ESOPHAGOGASTRODUODENOSCOPY);  Surgeon: Angelo Reynolds MD;  Location: [facility] (2ND FLR);  Service: Endoscopy;  Laterality: N/A;    ESOPHAGOGASTRODUODENOSCOPY N/A 06/26/2019    Procedure: EGD (ESOPHAGOGASTRODUODENOSCOPY);  Surgeon: Ramiro Jefferson MD;  Location: [facility] (4TH FLR);  Service: Endoscopy;  Laterality: N/A;     ESOPHAGOGASTRODUODENOSCOPY N/A 05/04/2022    Procedure: EGD (ESOPHAGOGASTRODUODENOSCOPY);  Surgeon: Ramiro Jefferson MD;  Location: [facility] (2ND FLR);  Service: Endoscopy;  Laterality: N/A;  fully vaccinated-GT    ESOPHAGOGASTRODUODENOSCOPY N/A 10/25/2023    Procedure: EGD (ESOPHAGOGASTRODUODENOSCOPY);  Surgeon: Jose Campos MD;  Location: [facility] ENDO;  Service: Endoscopy;  Laterality: N/A;    ESOPHAGOGASTRODUODENOSCOPY N/A 11/15/2023    Procedure: ESOPHAGOGASTRODUODENOSCOPY (EGD);  Surgeon: Db Sanchez MD;  Location: [facility] (4TH FLR);  Service: Endoscopy;  Laterality: N/A;  New diagnosis of esophageal varices. Not a candidate for beta blocker due to soft blood pressures. Recommend EV banding.  PT/INR done on 11/3/23 and CBC done on 9/30/23  ok per Dr. Espinoza to be done by 1st available provider-see tele encounter-st  1    EYE SURGERY      Cataract Removal    FLUOROSCOPIC URODYNAMIC STUDY N/A 11/09/2021    Procedure: URODYNAMIC STUDY, FLUOROSCOPIC;  Surgeon: Viridiana Valenzuela MD;  Location: [facility] OR 1ST FLR;  Service: Urology;  Laterality: N/A;  90 minutes     HYSTERECTOMY      JOINT REPLACEMENT      OOPHORECTOMY  1970    posterolateral fusion with autograft bone and [place] mineralized bone matrix  02/01/2013    at [facility] for lumbar spine stenosis    SMALL INTESTINE SURGERY      SPINE SURGERY      TOE SURGERY      TONSILLECTOMY      TRANSFORAMINAL EPIDURAL INJECTION OF STEROID Bilateral 12/21/2022    Procedure: Injection,steroid,epidural, Todd L5/S1 TF CONTRAST DIRECT REFERRAL;  Surgeon: Toni Osorio MD;  Location: [facility] PAIN MGT;  Service: Pain Management;  Laterality: Bilateral;    TRIGGER FINGER RELEASE         Family History   Problem Relation Name Age of Onset    Heart disease Father August 50        Mi age 50    Colon cancer Father August     Bladder Cancer Mother Mariana         non smoker    Cataracts Mother Mariana     Glaucoma Mother Mariana     Heart disease Mother Mariana     Hyperlipidemia Mother Mariana      Kidney disease Mother Mariana     Breast cancer Sister Sr. Damaris Dye 79    Arthritis Daughter Wendy     Asthma Daughter Wendy     Depression Daughter Wendy     Hypertension Daughter Wendy     Stroke Daughter Wendy 40    Arthritis Brother Timoteo     Colon cancer Brother Timoteo 70    Alcohol abuse Brother Timoteo     Parkinsonism Brother Ronnie     Alcohol abuse Brother Ronnie     Depression Daughter Danielle     Stroke Daughter Danielle     Alcohol abuse Brother Harjeet     Arthritis Brother Harjeet     Asthma Daughter Kimberly     Depression Daughter Kimberly     Celiac disease Neg Hx      Cirrhosis Neg Hx      Colon polyps Neg Hx      Crohn's disease Neg Hx      Cystic fibrosis Neg Hx      Esophageal cancer Neg Hx      Hemochromatosis Neg Hx      Inflammatory bowel disease Neg Hx      Irritable bowel syndrome Neg Hx      Liver cancer Neg Hx      Liver disease Neg Hx      Rectal cancer Neg Hx      Stomach cancer Neg Hx      Ulcerative colitis Neg Hx      Eliazar's disease Neg Hx      Amblyopia Neg Hx      Blindness Neg Hx      Macular degeneration Neg Hx      Retinal detachment Neg Hx      Strabismus Neg Hx      Melanoma Neg Hx         Social History     Tobacco Use    Smoking status: Never    Smokeless tobacco: Never   Substance Use Topics    Alcohol use: Not Currently     Comment: socially, rarely    Drug use: Never       Current Outpatient Medications   Medication Sig Dispense Refill    acetaminophen (TYLENOL) 650 MG TbSR Take 1,300 mg by mouth 2 (two) times a day.      ascorbic acid, vitamin C, (VITAMIN C) 1000 MG tablet Take 1,000 mg by mouth once daily.      azelastine (ASTELIN) 137 mcg (0.1 %) nasal spray 1 spray (137 mcg total) by Nasal route 2 (two) times daily. 30 mL 11    biotin 10,000 mcg TbDL Take 1 tablet by mouth once daily.       blood sugar diagnostic (FREESTYLE LITE STRIPS) Strp 1 strip by Misc.(Non-Drug; Combo Route) route 3 (three) times daily. 200 strip 5    blood-glucose meter kit Use as  instructed 1 each 0    calcium-vitamin D3 (OS-KASSANDRA 500 + D3) 500 mg-5 mcg (200 unit) per tablet Take 1 tablet by mouth once daily.      cholestyramine (QUESTRAN) 4 gram packet Take 1 packet (4 g total) by mouth 3 (three) times daily with meals. 270 packet 3    ciprofloxacin HCl (CIPRO) 250 MG tablet Take 1 tablet (250 mg total) by mouth 2 (two) times daily. for 7 days 14 tablet 0    conjugated estrogens (PREMARIN) vaginal cream INSERT 0.5 GRAM VAGINALLY 2 TIMES A WEEK 30 g 3    cranberry extract 200 mg Cap Take 1 capsule by mouth once daily.      diphenoxylate-atropine 2.5-0.025 mg (LOMOTIL) 2.5-0.025 mg per tablet Take 1 tablet by mouth 3 (three) times daily as needed for Diarrhea. 30 tablet 0    donepeziL (ARICEPT) 10 MG tablet TAKE 1 TABLET(10 MG) BY MOUTH EVERY EVENING 90 tablet 3    DULoxetine (CYMBALTA) 30 MG capsule Take 1 capsule (30 mg total) by mouth 2 (two) times daily. 180 capsule 3    ferrous sulfate 324 mg (65 mg iron) TbEC Take 1 tablet (324 mg total) by mouth once daily.  0    flash glucose scanning reader (iConTextSTYLE EFREN 14 DAY READER) Misc Check blood glucose level 3 times daily. 1 each 0    flash glucose sensor (FREESTYLE EFREN 14 DAY SENSOR) Kit APPLY 1 SENSOR EVERY 14 DAYS 6 kit 3    fosfomycin (MONUROL) 3 gram Pack Take by mouth.      gabapentin (NEURONTIN) 300 MG capsule Take 1 to 2 capsules 3 times daily if needed for leg pain. 180 capsule 5    HYDROcodone-acetaminophen (NORCO) 5-325 mg per tablet Take 1 tablet by mouth every 6 (six) hours as needed for Pain. 28 tablet 0    hydrocortisone 2.5 % cream Apply topically 2 (two) times daily as needed. 1 each 1    lancets (FREESTYLE LANCETS) 28 gauge Misc 30 lancets by Misc.(Non-Drug; Combo Route) route Daily. 30 each 3    levothyroxine (SYNTHROID) 75 MCG tablet TAKE 1 TABLET(75 MCG) BY MOUTH BEFORE BREAKFAST 90 tablet 3    levothyroxine (SYNTHROID) 75 MCG tablet TAKE 1 TABLET BY MOUTH BEFORE  BREAKFAST 90 tablet 3    LIDOcaine (LIDODERM) 5 % APPLY  1 TO 3 PATCHES TO BACK EVERY DAY. REMOVE WITHIN 12 HOURS 30 patch 2    linaGLIPtin (TRADJENTA) 5 mg Tab tablet TAKE 1 TABLET(5 MG) BY MOUTH EVERY DAY 90 tablet 3    loperamide (IMODIUM) 2 mg capsule Take 1 capsule (2 mg total) by mouth 2 (two) times daily as needed for Diarrhea. 30 capsule 1    loperamide (IMODIUM) 2 mg capsule Take 1 capsule (2 mg total) by mouth 4 (four) times daily as needed for Diarrhea. 10 capsule 0    magnesium 30 mg Tab Take 30 mg by mouth once. Patient does not know actual dose in MG      magnesium citrate solution Take 300 mLs by mouth.      metFORMIN (GLUCOPHAGE) 1000 MG tablet Take 1 tablet (1,000 mg total) by mouth 2 (two) times daily with meals. 180 tablet 3    mometasone (ELOCON) 0.1 % ointment Apply topically 2 (two) times a day. Mix 50/50 with mupirocin ointment. Apply in each nostril twice daily. 15 g 1    montelukast (SINGULAIR) 10 mg tablet Take 1 tablet (10 mg total) by mouth once daily. 90 tablet 3    ondansetron (ZOFRAN-ODT) 8 MG TbDL Take 1 tablet (8 mg total) by mouth every 8 (eight) hours as needed (nausea). 30 tablet 2    ospemifene (OSPHENA) 60 mg Tab Take by mouth.      pantoprazole (PROTONIX) 20 MG tablet TAKE 1 TABLET(20 MG) BY MOUTH TWICE DAILY 180 tablet 1    polyethylene glycol (GLYCOLAX) 17 gram PwPk Take 17 g by mouth once daily. 90 each 3    pregabalin (LYRICA) 100 MG capsule Take 100 mg by mouth.      traZODone (DESYREL) 50 MG tablet TAKE 1 TABLET(50 MG) BY MOUTH EVERY EVENING 90 tablet 3    triamcinolone acetonide 0.1% (KENALOG) 0.1 % paste 3 (three) times daily.      ubrogepant (UBRELVY) 100 mg tablet Take 1 tablet daily as needed for migraine headache. May take 1 additional tablet 2 hours later if needed. 16 tablet 2    valACYclovir (VALTREX) 1000 MG tablet Take 1 tablet (1,000 mg total) by mouth once daily. 90 tablet 1    atorvastatin (LIPITOR) 40 MG tablet Take 40 mg by mouth once daily.      rifAXIMin (XIFAXAN) 550 mg Tab Take 1 tablet (550 mg total) by  "mouth 2 (two) times daily. 60 tablet 11     No current facility-administered medications for this visit.       Review of patient's allergies indicates:   Allergen Reactions    Codeine Itching and Nausea And Vomiting    Dilaudid [hydromorphone (bulk)] Other (See Comments)     Oversedating, head burning. Pt prefers to avoid.       Percocet [oxycodone-acetaminophen] Itching    Sulfa (sulfonamide antibiotics) Itching and Nausea And Vomiting    Dilaudid [hydromorphone]     Latex, natural rubber Rash            Vitals:    10/08/24 1441   BP: 134/64   Pulse: 74   Resp: 18   Temp: 97.2 °F (36.2 °C)   TempSrc: Oral   SpO2: 99%   Weight: 59.6 kg (131 lb 6.3 oz)   Height: 5' 2" (1.575 m)   Body mass index is 24.03 kg/m².    Physical Exam  Constitutional:       General: She is not in acute distress.  Eyes:      General: No scleral icterus.  Cardiovascular:      Rate and Rhythm: Normal rate.   Pulmonary:      Effort: Pulmonary effort is normal.   Abdominal:      General: Abdomen is flat. There is no distension.   Musculoskeletal:         General: No swelling.   Skin:     Coloration: Skin is not jaundiced.   Neurological:      General: No focal deficit present.      Mental Status: She is alert.      Comments: No asterixis.    Psychiatric:         Thought Content: Thought content normal.       LABS: I personally reviewed pertinent laboratory findings.    Lab Results   Component Value Date    WBC 4.68 10/08/2024    HGB 11.1 (L) 10/08/2024    HCT 34.2 (L) 10/08/2024     (H) 10/08/2024    PLT 90 (L) 10/08/2024       Lab Results   Component Value Date     10/08/2024    K 4.6 10/08/2024     10/08/2024    CO2 21 (L) 10/08/2024    BUN 9 10/08/2024    CREATININE 1.0 10/08/2024    CALCIUM 9.8 10/08/2024    ANIONGAP 12 10/08/2024    ESTGFRAFRICA >60.0 06/11/2022    EGFRNONAA >60.0 06/11/2022       Lab Results   Component Value Date    ALT 18 10/08/2024    AST 25 10/08/2024    GGT 72 (H) 11/03/2023    ALKPHOS 55 10/08/2024 "    BILITOT 0.7 10/08/2024       Lab Results   Component Value Date    HEPAIGM Negative 09/09/2020    HEPBIGM Negative 09/09/2020    HEPBCAB Negative 09/09/2020    HEPCAB Non-reactive 07/07/2023       Lab Results   Component Value Date    TOSHIA Negative <1:160 07/31/2014      Lab Results   Component Value Date    AFP 4.4 10/08/2024       MELD 3.0: 7 at 10/8/2024  9:00 AM  MELD-Na: 6 at 10/8/2024  9:00 AM  Calculated from:  Serum Creatinine: 1 mg/dL at 10/8/2024  9:00 AM  Serum Sodium: 138 mmol/L (Using max of 137 mmol/L) at 10/8/2024  9:00 AM  Total Bilirubin: 0.7 mg/dL (Using min of 1 mg/dL) at 10/8/2024  9:00 AM  Serum Albumin: 3.8 g/dL (Using max of 3.5 g/dL) at 10/8/2024  9:00 AM  INR(ratio): 1 at 10/8/2024  9:00 AM  Age at listing (hypothetical): 79 years  Sex: Female at 10/8/2024  9:00 AM       IMAGING: I personally reviewed imaging studies.    Assessment: Anayeli Judd is a 79 y.o. female with compensated Met-ALD cirrhosis complicated by non-bleeding esophageal varices, DM Type 2, HLD, who presents for scheduled follow up.    1. Compensated liver disease    2. MetALD    3. Portal hypertension    4. Secondary esophageal varices without bleeding    5. Hepatic coma/encephalopathy    6. Brain fog    7. Liver disease, unspecified    8. Vaginal pain      #Compensated MetALD Cirrhosis    Volume: No an active issue.  Infection: No concern at this time.   Bleeding: No history of EVB. Last EGD large EV but unable to complete band ligation due to inability to intubate esophagus with  (11/15/23). Discussed with NSBB with patient and family -> decided to defer NSBB to avoid hypotension / falls.   Encephalopathy: Patient endorses worsening brain fog, somnolence, forgetfulness. Unable to tolerate lactulose due to chronic diarrhea. Plan to trial rifaximin 550mg BID.  Screening: AFP 4.0 4/17/24, US w/o HCC 4/30/24. AFP and liver US q6 months.  Immunization: HAV immune. Recommend HBV vaccination.  Transplant: Low MELD.  Compensated disease. Barrier includes advanced age.    #Vaginal Pain: Referral to Gyn.     Return to clinic in 6 months.    Jaqueline Espinoza MD  Staff Physician  Hepatology and Liver Transplant  [facility]  [facility]

## 2024-10-09 PROBLEM — K76.82 HEPATIC ENCEPHALOPATHY: Status: ACTIVE | Noted: 2024-10-09

## 2024-10-12 DIAGNOSIS — K76.6 PORTAL HYPERTENSION: ICD-10-CM

## 2024-10-12 DIAGNOSIS — I85.10 SECONDARY ESOPHAGEAL VARICES WITHOUT BLEEDING: ICD-10-CM

## 2024-10-13 DIAGNOSIS — K76.6 PORTAL HYPERTENSION: ICD-10-CM

## 2024-10-13 DIAGNOSIS — I85.10 SECONDARY ESOPHAGEAL VARICES WITHOUT BLEEDING: ICD-10-CM

## 2024-10-23 ENCOUNTER — PATIENT MESSAGE (OUTPATIENT)
Dept: ADMINISTRATIVE | Facility: HOSPITAL | Age: 79
End: 2024-10-23
Payer: MEDICARE

## 2024-10-30 ENCOUNTER — TELEPHONE (OUTPATIENT)
Dept: TRANSPLANT | Facility: CLINIC | Age: 79
End: 2024-10-30
Payer: MEDICARE

## 2024-11-05 DIAGNOSIS — K76.6 PORTAL HYPERTENSION: ICD-10-CM

## 2024-11-05 DIAGNOSIS — I85.10 SECONDARY ESOPHAGEAL VARICES WITHOUT BLEEDING: ICD-10-CM

## 2024-11-19 DIAGNOSIS — K21.9 GASTROESOPHAGEAL REFLUX DISEASE, UNSPECIFIED WHETHER ESOPHAGITIS PRESENT: Chronic | ICD-10-CM

## 2024-11-19 RX ORDER — PANTOPRAZOLE SODIUM 20 MG/1
20 TABLET, DELAYED RELEASE ORAL 2 TIMES DAILY
Qty: 180 TABLET | Refills: 3 | Status: SHIPPED | OUTPATIENT
Start: 2024-11-19

## 2024-11-19 NOTE — TELEPHONE ENCOUNTER
Care Due:                  Date            Visit Type   Department     Provider  --------------------------------------------------------------------------------                                             [facility]   INTERNAL  Last Visit: 10-      PATIENT      MEDICINE       Darci Guthrie  Next Visit: None Scheduled  None         None Found                                                            Last  Test          Frequency    Reason                     Performed    Due Date  --------------------------------------------------------------------------------    HBA1C.......  6 months...  linaGLIPtin, metFORMIN...  01- 07-    [facility] Embedded Care Due Messages. Reference number: [id number].   11/19/2024 4:05:36 PM CST

## 2024-11-20 NOTE — TELEPHONE ENCOUNTER
Provider Staff:  Action required for this patient    Requires labs      Please see care gap opportunities below in Care Due Message.    Thanks!  [facility]     Appointments      Date Provider   Last Visit   10/3/2024 Darci Guthrie MD   Next Visit   Visit date not found Darci Guthrie MD     Refill Decision Note   Anayeli Judd  is requesting a refill authorization.  Brief Assessment and Rationale for Refill:  Approve     Medication Therapy Plan:         Comments:     Note composed:8:46 PM 11/19/2024

## 2024-11-22 NOTE — TELEPHONE ENCOUNTER
No care due was identified.  [facility] Embedded Care Due Messages. Reference number: [id number].   9/02/2024 3:20:04 AM CDT  
Refill Decision Note   Anayeli Judd  is requesting a refill authorization.  Brief Assessment and Rationale for Refill:  Approve     Medication Therapy Plan:       Medication Reconciliation Completed: No   Comments:     No Care Gaps recommended.     Note composed:9:24 PM 09/02/2024          
Previously Declined (within the last year)

## 2024-12-08 DIAGNOSIS — M54.9 BACK PAIN, UNSPECIFIED BACK LOCATION, UNSPECIFIED BACK PAIN LATERALITY, UNSPECIFIED CHRONICITY: ICD-10-CM

## 2024-12-08 NOTE — TELEPHONE ENCOUNTER
No care due was identified.  [facility] Embedded Care Due Messages. Reference number: [id number].   12/08/2024 12:49:56 PM CST

## 2024-12-09 RX ORDER — LIDOCAINE 50 MG/G
PATCH TOPICAL
Qty: 30 PATCH | Refills: 2 | Status: SHIPPED | OUTPATIENT
Start: 2024-12-09

## 2024-12-09 NOTE — TELEPHONE ENCOUNTER
Refill Routing Note   Medication(s) are not appropriate for processing by [facility] for the following reason(s):        Outside of protocol    [facility] action(s):  Route               Appointments  past 12m or future 3m with PCP    Date Provider   Last Visit   10/3/2024 Darci Guthrie MD   Next Visit   Visit date not found Darci Guthrie MD   ED visits in past 90 days: 0        Note composed:7:50 PM 12/08/2024

## 2024-12-13 ENCOUNTER — PATIENT MESSAGE (OUTPATIENT)
Dept: INTERNAL MEDICINE | Facility: CLINIC | Age: 79
End: 2024-12-13
Payer: MEDICARE

## 2025-01-08 DIAGNOSIS — E11.69 TYPE 2 DIABETES MELLITUS WITH OTHER SPECIFIED COMPLICATION, WITHOUT LONG-TERM CURRENT USE OF INSULIN: ICD-10-CM

## 2025-01-08 RX ORDER — LINAGLIPTIN 5 MG/1
5 TABLET, FILM COATED ORAL
Qty: 90 TABLET | Refills: 3 | Status: SHIPPED | OUTPATIENT
Start: 2025-01-08

## 2025-01-08 NOTE — TELEPHONE ENCOUNTER
No care due was identified.  [facility] Embedded Care Due Messages. Reference number: [id number].   1/08/2025 8:10:14 AM CST

## 2025-01-08 NOTE — TELEPHONE ENCOUNTER
Refill Routing Note   Medication(s) are not appropriate for processing by [facility] for the following reason(s):        Required labs outdated    [facility] action(s):  Defer             Appointments  past 12m or future 3m with PCP    Date Provider   Last Visit   10/3/2024 Darci Guthrie MD   Next Visit   Visit date not found Darci Guthrie MD   ED visits in past 90 days: 0        Note composed:11:37 AM 01/08/2025

## 2025-01-22 ENCOUNTER — PATIENT MESSAGE (OUTPATIENT)
Dept: ADMINISTRATIVE | Facility: HOSPITAL | Age: 80
End: 2025-01-22
Payer: MEDICARE

## 2025-01-27 DIAGNOSIS — N95.2 VAGINAL ATROPHY: ICD-10-CM

## 2025-01-27 RX ORDER — CONJUGATED ESTROGENS 0.62 MG/G
CREAM VAGINAL
Qty: 30 G | Refills: 3 | Status: SHIPPED | OUTPATIENT
Start: 2025-01-27

## 2025-01-27 NOTE — TELEPHONE ENCOUNTER
Refill Routing Note   Medication(s) are not appropriate for processing by [facility] for the following reason(s):        Outside of protocol    [facility] action(s):  Route     Requires labs : Yes      Medication Therapy Plan: Contraindicated diagnosis on problem list      Appointments  past 12m or future 3m with PCP    Date Provider   Last Visit   10/3/2024 Darci Guthrie MD   Next Visit   Visit date not found Darci Guthrie MD   ED visits in past 90 days: 0        Note composed:2:39 PM 01/27/2025

## 2025-01-27 NOTE — TELEPHONE ENCOUNTER
Care Due:                  Date            Visit Type   Department     Provider  --------------------------------------------------------------------------------                                             [facility]   INTERNAL  Last Visit: 10-      PATIENT      MEDICINE       Darci Guthrie  Next Visit: None Scheduled  None         None Found                                                            Last  Test          Frequency    Reason                     Performed    Due Date  --------------------------------------------------------------------------------    HBA1C.......  6 months...  linaGLIPtin, metFORMIN...  01- 07-    [facility] Embedded Care Due Messages. Reference number: [id number].   1/27/2025 12:51:27 PM CST

## 2025-01-30 DIAGNOSIS — Z00.00 ENCOUNTER FOR MEDICARE ANNUAL WELLNESS EXAM: ICD-10-CM

## 2025-02-03 ENCOUNTER — TELEPHONE (OUTPATIENT)
Dept: INTERNAL MEDICINE | Facility: CLINIC | Age: 80
End: 2025-02-03
Payer: MEDICARE

## 2025-02-03 DIAGNOSIS — N30.00 ACUTE CYSTITIS WITHOUT HEMATURIA: Primary | ICD-10-CM

## 2025-02-03 RX ORDER — NITROFURANTOIN 25; 75 MG/1; MG/1
100 CAPSULE ORAL 2 TIMES DAILY
Qty: 14 CAPSULE | Refills: 0 | Status: SHIPPED | OUTPATIENT
Start: 2025-02-03 | End: 2025-02-10

## 2025-02-04 NOTE — TELEPHONE ENCOUNTER
Daughter notes possible UTI recurrence with mild temp. Agreed on trying course of Nitrofurantoin to start. If persistent issues, will then have her give sample for lab.  
Yes...

## 2025-02-10 ENCOUNTER — CLINICAL SUPPORT (OUTPATIENT)
Dept: INTERNAL MEDICINE | Facility: CLINIC | Age: 80
End: 2025-02-10
Payer: MEDICARE

## 2025-02-10 ENCOUNTER — OFFICE VISIT (OUTPATIENT)
Dept: INTERNAL MEDICINE | Facility: CLINIC | Age: 80
End: 2025-02-10
Payer: MEDICARE

## 2025-02-10 VITALS
BODY MASS INDEX: 24.41 KG/M2 | HEART RATE: 60 BPM | SYSTOLIC BLOOD PRESSURE: 121 MMHG | DIASTOLIC BLOOD PRESSURE: 70 MMHG | WEIGHT: 132.63 LBS | HEIGHT: 62 IN

## 2025-02-10 DIAGNOSIS — R82.90 ABNORMAL FINDING ON URINALYSIS: ICD-10-CM

## 2025-02-10 DIAGNOSIS — R10.9 ABDOMINAL PAIN, UNSPECIFIED ABDOMINAL LOCATION: Primary | ICD-10-CM

## 2025-02-10 DIAGNOSIS — R10.9 ABDOMINAL PAIN, UNSPECIFIED ABDOMINAL LOCATION: ICD-10-CM

## 2025-02-10 LAB
ALBUMIN SERPL BCP-MCNC: 4 G/DL (ref 3.5–5.2)
ALP SERPL-CCNC: 65 U/L (ref 40–150)
ALT SERPL W/O P-5'-P-CCNC: 15 U/L (ref 10–44)
ANION GAP SERPL CALC-SCNC: 10 MMOL/L (ref 8–16)
ANISOCYTOSIS BLD QL SMEAR: SLIGHT
AST SERPL-CCNC: 25 U/L (ref 10–40)
BASOPHILS # BLD AUTO: 0.04 K/UL (ref 0–0.2)
BASOPHILS NFR BLD: 0.8 % (ref 0–1.9)
BILIRUB SERPL-MCNC: 0.9 MG/DL (ref 0.1–1)
BUN SERPL-MCNC: 10 MG/DL (ref 8–23)
CALCIUM SERPL-MCNC: 9.8 MG/DL (ref 8.7–10.5)
CHLORIDE SERPL-SCNC: 106 MMOL/L (ref 95–110)
CO2 SERPL-SCNC: 20 MMOL/L (ref 23–29)
CREAT SERPL-MCNC: 1 MG/DL (ref 0.5–1.4)
DIFFERENTIAL METHOD BLD: ABNORMAL
DOHLE BOD BLD QL SMEAR: PRESENT
EOSINOPHIL # BLD AUTO: 0.2 K/UL (ref 0–0.5)
EOSINOPHIL NFR BLD: 3.7 % (ref 0–8)
ERYTHROCYTE [DISTWIDTH] IN BLOOD BY AUTOMATED COUNT: 13.8 % (ref 11.5–14.5)
EST. GFR  (NO RACE VARIABLE): 56.2 ML/MIN/1.73 M^2
GIANT PLATELETS BLD QL SMEAR: PRESENT
GLUCOSE SERPL-MCNC: 167 MG/DL (ref 70–110)
HCT VFR BLD AUTO: 36 % (ref 37–48.5)
HGB BLD-MCNC: 12.4 G/DL (ref 12–16)
IMM GRANULOCYTES # BLD AUTO: 0.04 K/UL (ref 0–0.04)
IMM GRANULOCYTES NFR BLD AUTO: 0.8 % (ref 0–0.5)
INR PPP: 1.1 (ref 0.8–1.2)
LIPASE SERPL-CCNC: 18 U/L (ref 4–60)
LYMPHOCYTES # BLD AUTO: 1.6 K/UL (ref 1–4.8)
LYMPHOCYTES NFR BLD: 31.8 % (ref 18–48)
MCH RBC QN AUTO: 35.1 PG (ref 27–31)
MCHC RBC AUTO-ENTMCNC: 34.4 G/DL (ref 32–36)
MCV RBC AUTO: 102 FL (ref 82–98)
MONOCYTES # BLD AUTO: 0.4 K/UL (ref 0.3–1)
MONOCYTES NFR BLD: 8.1 % (ref 4–15)
NEUTROPHILS # BLD AUTO: 2.8 K/UL (ref 1.8–7.7)
NEUTROPHILS NFR BLD: 54.8 % (ref 38–73)
NRBC BLD-RTO: 0 /100 WBC
OVALOCYTES BLD QL SMEAR: ABNORMAL
PLATELET # BLD AUTO: 89 K/UL (ref 150–450)
PLATELET BLD QL SMEAR: ABNORMAL
PMV BLD AUTO: 11.9 FL (ref 9.2–12.9)
POIKILOCYTOSIS BLD QL SMEAR: SLIGHT
POTASSIUM SERPL-SCNC: 5 MMOL/L (ref 3.5–5.1)
PROT SERPL-MCNC: 7.9 G/DL (ref 6–8.4)
PROTHROMBIN TIME: 12 SEC (ref 9–12.5)
RBC # BLD AUTO: 3.53 M/UL (ref 4–5.4)
SCHISTOCYTES BLD QL SMEAR: ABNORMAL
SCHISTOCYTES BLD QL SMEAR: PRESENT
SMUDGE CELLS BLD QL SMEAR: PRESENT
SODIUM SERPL-SCNC: 136 MMOL/L (ref 136–145)
TOXIC GRANULES BLD QL SMEAR: PRESENT
WBC # BLD AUTO: 5.09 K/UL (ref 3.9–12.7)
WBC TOXIC VACUOLES BLD QL SMEAR: PRESENT

## 2025-02-10 PROCEDURE — 99214 OFFICE O/P EST MOD 30 MIN: CPT | Mod: S$PBB,,, | Performed by: EMERGENCY MEDICINE

## 2025-02-10 PROCEDURE — 99214 OFFICE O/P EST MOD 30 MIN: CPT | Mod: PBBFAC | Performed by: EMERGENCY MEDICINE

## 2025-02-10 PROCEDURE — 80053 COMPREHEN METABOLIC PANEL: CPT | Performed by: EMERGENCY MEDICINE

## 2025-02-10 PROCEDURE — 83690 ASSAY OF LIPASE: CPT | Performed by: EMERGENCY MEDICINE

## 2025-02-10 PROCEDURE — 99999 PR PBB SHADOW E&M-EST. PATIENT-LVL IV: CPT | Mod: PBBFAC,,, | Performed by: EMERGENCY MEDICINE

## 2025-02-10 PROCEDURE — 85025 COMPLETE CBC W/AUTO DIFF WBC: CPT | Performed by: EMERGENCY MEDICINE

## 2025-02-10 PROCEDURE — 85610 PROTHROMBIN TIME: CPT | Performed by: EMERGENCY MEDICINE

## 2025-02-10 RX ORDER — CIPROFLOXACIN 500 MG/1
500 TABLET ORAL 2 TIMES DAILY
Qty: 14 TABLET | Refills: 0 | Status: SHIPPED | OUTPATIENT
Start: 2025-02-10 | End: 2025-02-17

## 2025-02-11 ENCOUNTER — HOSPITAL ENCOUNTER (OUTPATIENT)
Dept: RADIOLOGY | Facility: OTHER | Age: 80
Discharge: HOME | End: 2025-02-11
Attending: EMERGENCY MEDICINE
Payer: MEDICARE

## 2025-02-11 DIAGNOSIS — R10.9 ABDOMINAL PAIN, UNSPECIFIED ABDOMINAL LOCATION: ICD-10-CM

## 2025-02-11 PROCEDURE — 74176 CT ABD & PELVIS W/O CONTRAST: CPT | Mod: 26,,, | Performed by: RADIOLOGY

## 2025-02-11 PROCEDURE — 25500020 PHARM REV CODE 255: Performed by: EMERGENCY MEDICINE

## 2025-02-11 PROCEDURE — 74176 CT ABD & PELVIS W/O CONTRAST: CPT | Mod: TC

## 2025-02-11 RX ADMIN — IOHEXOL 25 ML: 350 INJECTION, SOLUTION INTRAVENOUS at 02:02

## 2025-02-13 NOTE — PROGRESS NOTES
"Subjective     Patient ID: Anayeli Judd is a 80 y.o. female.    Chief Complaint: Follow-up      History of Present Illness    CHIEF COMPLAINT:  Ms. Judd presents today with generalized abdominal discomfort.    HISTORY OF PRESENT ILLNESS:  She reports intermittent generalized abdominal discomfort for the past few days, initially starting in the left lower quadrant before becoming more diffuse. She expresses concern about possible bowel obstruction due to her history of small bowel resection.    MEDICAL HISTORY:  She has a history of irritable bowel syndrome, GERD, diabetes, and microscopic colitis. She previously underwent small bowel resection due to a mass in the small colon with subsequent bowel obstructions.    RECENT INFECTION:  Urine cultures showed current antibiotic regimen does not cover her urinary tract infection.      ROS:  General: -fever, -chills, -fatigue, -weight gain, -weight loss  Eyes: -vision changes, -redness, -discharge  ENT: -ear pain, -nasal congestion, -sore throat  Cardiovascular: -chest pain, -palpitations, -lower extremity edema  Respiratory: -cough, -shortness of breath  Gastrointestinal: +abdominal pain, -nausea, -vomiting, -diarrhea, -constipation, -blood in stool  Genitourinary: -dysuria, -hematuria, -frequency  Musculoskeletal: -joint pain, -muscle pain  Skin: -rash, -lesion  Neurological: -headache, -dizziness, -numbness, -tingling  Psychiatric: -anxiety, -depression, -sleep difficulty         OBJECTIVE:  PHYSICAL EXAM;  Vitals:    02/10/25 1425   BP: 121/70   BP Location: Left arm   Patient Position: Sitting   Pulse: 60   Weight: 60.2 kg (132 lb 9.7 oz)   Height: 5' 2" (1.575 m)     Physical Exam    General: No acute distress. Well-developed. Well-nourished.  Eyes: EOMI. Sclerae anicteric.  HENT: Normocephalic. Atraumatic. Nares patent. Moist oral mucosa.  Ears: Bilateral TMs clear. Bilateral EACs clear.  Cardiovascular: Regular rate. Regular rhythm. No murmurs. No rubs. No " gallops. Normal S1, S2.  Respiratory: Normal respiratory effort. Clear to auscultation bilaterally. No rales. No rhonchi. No wheezing.  Abdomen: Soft. Non-tender. Non-distended. Normoactive bowel sounds. Abdominal exam is relatively benign.  Musculoskeletal: No  obvious deformity.  Extremities: No lower extremity edema.  Neurological: Alert & oriented x3. No slurred speech. Normal gait.  Psychiatric: Normal mood. Normal affect. Good insight. Good judgment.  Skin: Warm. Dry. No rash.         TESTS:   Latest Reference Range & Units 02/10/25 15:11   WBC 3.90 - 12.70 K/uL 5.09   RBC 4.00 - 5.40 M/uL 3.53 (L)   Hemoglobin 12.0 - 16.0 g/dL 12.4   Hematocrit 37.0 - 48.5 % 36.0 (L)   MCV 82 - 98 fL 102 (H)   MCH 27.0 - 31.0 pg 35.1 (H)   MCHC 32.0 - 36.0 g/dL 34.4   RDW 11.5 - 14.5 % 13.8   Platelet Count 150 - 450 K/uL 89 (L)   MPV 9.2 - 12.9 fL 11.9   Platelet Estimate  Clumped !   Gran % 38.0 - 73.0 % 54.8   Lymph % 18.0 - 48.0 % 31.8   Mono % 4.0 - 15.0 % 8.1   Eos % 0.0 - 8.0 % 3.7   Basophil % 0.0 - 1.9 % 0.8   Immature Granulocytes 0.0 - 0.5 % 0.8 (H)   Gran # (ANC) 1.8 - 7.7 K/uL 2.8   Lymph # 1.0 - 4.8 K/uL 1.6   Mono # 0.3 - 1.0 K/uL 0.4   Eos # 0.0 - 0.5 K/uL 0.2   Baso # 0.00 - 0.20 K/uL 0.04   Immature Grans (Abs) 0.00 - 0.04 K/uL 0.04   nRBC 0 /100 WBC 0   Differential Method  Automated   Ovalocytes  Occasional   Aniso  Slight   Poikilocytosis  Slight   Giant Platelets  Present   Dohle Bodies  Present   Schistocytes  Present   Smudge Cells  Present   Toxic Granulation  Present   Fragmented Cells  Occasional   Vacuolated Granulocytes  Present   PT 9.0 - 12.5 sec 12.0   INR 0.8 - 1.2  1.1   Sodium 136 - 145 mmol/L 136   Potassium 3.5 - 5.1 mmol/L 5.0   Chloride 95 - 110 mmol/L 106   CO2 23 - 29 mmol/L 20 (L)   Anion Gap 8 - 16 mmol/L 10   BUN 8 - 23 mg/dL 10   Creatinine 0.5 - 1.4 mg/dL 1.0   eGFR >60 mL/min/1.73 m^2 56.2 !   Glucose 70 - 110 mg/dL 167 (H)   Calcium 8.7 - 10.5 mg/dL 9.8   ALP 40 - 150 U/L 65    PROTEIN TOTAL 6.0 - 8.4 g/dL 7.9   Albumin 3.5 - 5.2 g/dL 4.0   BILIRUBIN TOTAL 0.1 - 1.0 mg/dL 0.9   AST 10 - 40 U/L 25   ALT 10 - 44 U/L 15   Lipase 4 - 60 U/L 18     CT ABDOMEN PELVIS WITHOUT CONTRAST FEB 2025 Impression: Postoperative changes of small-bowel resection.  No evidence for high-grade bowel obstruction.    MDM  Reviewed: previous chart, nursing note and vitals  Reviewed previous: labs and CT scan  Interpretation: labs      June was seen today for follow-up.    Diagnoses and all orders for this visit:    Abdominal pain, unspecified abdominal location  -     COMPREHENSIVE METABOLIC PANEL; Future  -     CBC W/ AUTO DIFFERENTIAL; Future  -     LIPASE; Future  -     PROTIME-INR; Future  -     Cancel: CT Abdomen Pelvis With IV Contrast Routine Oral Contrast; Future  -     ciprofloxacin HCl (CIPRO) 500 MG tablet; Take 1 tablet (500 mg total) by mouth 2 (two) times daily. for 7 days    Abnormal finding on urinalysis  -     ciprofloxacin HCl (CIPRO) 500 MG tablet; Take 1 tablet (500 mg total) by mouth 2 (two) times daily. for 7 days       Assessment & Plan    IMPRESSION:  - Evaluated 80-year-old female with abdominal discomfort, ruling out bowel obstruction  - Reviewed labs showing low hematocrit, elevated MCV, low bicarb, and decreased GFR  - Assessed CT results, confirming no evidence of elevated-grade obstruction  - Determined treatment approach based on lab results and imaging findings    - Ms. Judd to keep hydrated    - Started ciprofloxacin for diarrhea and UTI caused by Klebsiella    - CBC, INR, electrolytes, liver function tests, and lipase ordered  - CT of abdomen performed            The results of physical exam findings, labs, and imaging were reviewed with the patient. Management of above assessments/visit diagnoses was discussed with patient. Precautions for return discussed at length. Patient was given ample time for questions. All questions asked and answered to the satisfaction of the  patient. Patient is in agreement with the above and verbalized understanding. Total time spent caring for the patient today was 30 minutes. This includes time spent before the visit reviewing the chart, time spent during the visit, and time spent after the visit on documentation.     Gonzalo Thomas MD  [facility]     Disclaimer: This document was created using voice recognition software ([employer]). Although it may be edited, this document may contain errors related to incorrect recognition of the spoken word. Please contact the physician if clarification is needed.

## 2025-02-26 DIAGNOSIS — E11.69 TYPE 2 DIABETES MELLITUS WITH OTHER SPECIFIED COMPLICATION, WITHOUT LONG-TERM CURRENT USE OF INSULIN: ICD-10-CM

## 2025-02-26 RX ORDER — METFORMIN HYDROCHLORIDE 1000 MG/1
1000 TABLET ORAL 2 TIMES DAILY WITH MEALS
Qty: 180 TABLET | Refills: 3 | Status: SHIPPED | OUTPATIENT
Start: 2025-02-26

## 2025-02-26 NOTE — TELEPHONE ENCOUNTER
Care Due:                  Date            Visit Type   Department     Provider  --------------------------------------------------------------------------------                                             [facility]   INTERNAL  Last Visit: 10-      PATIENT      MEDICINE       Darci Guthrie  Next Visit: None Scheduled  None         None Found                                                            Last  Test          Frequency    Reason                     Performed    Due Date  --------------------------------------------------------------------------------    TSH.........  12 months..  levothyroxine............  01- 01-    [facility] Embedded Care Due Messages. Reference number: [id number].   2/26/2025 8:09:56 AM CST

## 2025-02-26 NOTE — TELEPHONE ENCOUNTER
Refill Routing Note   Medication(s) are not appropriate for processing by [facility] for the following reason(s):        Required labs outdated    [facility] action(s):  Defer     Requires labs : Yes             Appointments  past 12m or future 3m with PCP    Date Provider   Last Visit   10/3/2024 Darci Guthrie MD   Next Visit   Visit date not found Darci Guthrie MD   ED visits in past 90 days: 0        Note composed:11:30 AM 02/26/2025

## 2025-03-19 DIAGNOSIS — M54.9 BACK PAIN, UNSPECIFIED BACK LOCATION, UNSPECIFIED BACK PAIN LATERALITY, UNSPECIFIED CHRONICITY: ICD-10-CM

## 2025-03-19 RX ORDER — LIDOCAINE 50 MG/G
PATCH TOPICAL
Qty: 30 PATCH | Refills: 2 | Status: SHIPPED | OUTPATIENT
Start: 2025-03-19

## 2025-03-19 NOTE — TELEPHONE ENCOUNTER
Refill Routing Note   Medication(s) are not appropriate for processing by [facility] for the following reason(s):        Outside of protocol    [facility] action(s):  Route               Appointments  past 12m or future 3m with PCP    Date Provider   Last Visit   10/3/2024 Darci Guthrie MD   Next Visit   Visit date not found Darci Guthrie MD   ED visits in past 90 days: 0        Note composed:2:01 PM 03/19/2025

## 2025-03-19 NOTE — TELEPHONE ENCOUNTER
No care due was identified.  [facility] Embedded Care Due Messages. Reference number: [id number].   3/19/2025 10:42:24 AM CDT

## 2025-03-28 ENCOUNTER — NURSE TRIAGE (OUTPATIENT)
Dept: ADMINISTRATIVE | Facility: CLINIC | Age: 80
End: 2025-03-28
Payer: MEDICARE

## 2025-03-28 ENCOUNTER — HOSPITAL ENCOUNTER (INPATIENT)
Facility: HOSPITAL | Age: 80
LOS: 3 days | Discharge: HOME | DRG: 389 | End: 2025-03-31
Attending: STUDENT IN AN ORGANIZED HEALTH CARE EDUCATION/TRAINING PROGRAM | Admitting: INTERNAL MEDICINE
Payer: MEDICARE

## 2025-03-28 ENCOUNTER — TELEPHONE (OUTPATIENT)
Dept: INTERNAL MEDICINE | Facility: CLINIC | Age: 80
End: 2025-03-28
Payer: MEDICARE

## 2025-03-28 DIAGNOSIS — R07.9 CHEST PAIN: ICD-10-CM

## 2025-03-28 DIAGNOSIS — K56.609 SBO (SMALL BOWEL OBSTRUCTION): Primary | ICD-10-CM

## 2025-03-28 DIAGNOSIS — K59.00 CONSTIPATION, UNSPECIFIED CONSTIPATION TYPE: Primary | ICD-10-CM

## 2025-03-28 LAB
ABSOLUTE EOSINOPHIL (OHS): 0.16 K/UL
ABSOLUTE MONOCYTE (OHS): 0.57 K/UL (ref 0.3–1)
ABSOLUTE NEUTROPHIL COUNT (OHS): 3.13 K/UL (ref 1.8–7.7)
ALBUMIN SERPL BCP-MCNC: 4 G/DL (ref 3.5–5.2)
ALP SERPL-CCNC: 74 UNIT/L (ref 40–150)
ALT SERPL W/O P-5'-P-CCNC: 21 UNIT/L (ref 10–44)
ANION GAP (OHS): 9 MMOL/L (ref 8–16)
AST SERPL-CCNC: 27 UNIT/L (ref 11–45)
BASOPHILS # BLD AUTO: 0.03 K/UL
BASOPHILS NFR BLD AUTO: 0.5 %
BILIRUB SERPL-MCNC: 0.9 MG/DL (ref 0.1–1)
BUN SERPL-MCNC: 11 MG/DL (ref 8–23)
CALCIUM SERPL-MCNC: 10.2 MG/DL (ref 8.7–10.5)
CHLORIDE SERPL-SCNC: 107 MMOL/L (ref 95–110)
CO2 SERPL-SCNC: 20 MMOL/L (ref 23–29)
CREAT SERPL-MCNC: 1 MG/DL (ref 0.5–1.4)
ERYTHROCYTE [DISTWIDTH] IN BLOOD BY AUTOMATED COUNT: 13.7 % (ref 11.5–14.5)
GFR SERPLBLD CREATININE-BSD FMLA CKD-EPI: 56 ML/MIN/1.73/M2
GLUCOSE SERPL-MCNC: 141 MG/DL (ref 70–110)
HCT VFR BLD AUTO: 35.5 % (ref 37–48.5)
HCV AB SERPL QL IA: NORMAL
HGB BLD-MCNC: 11.5 GM/DL (ref 12–16)
HIV 1+2 AB+HIV1 P24 AG SERPL QL IA: NORMAL
IMM GRANULOCYTES # BLD AUTO: 0.02 K/UL (ref 0–0.04)
IMM GRANULOCYTES NFR BLD AUTO: 0.4 % (ref 0–0.5)
LIPASE SERPL-CCNC: 23 U/L (ref 4–60)
LYMPHOCYTES # BLD AUTO: 1.61 K/UL (ref 1–4.8)
MCH RBC QN AUTO: 34.2 PG (ref 27–50)
MCHC RBC AUTO-ENTMCNC: 32.4 G/DL (ref 32–36)
MCV RBC AUTO: 106 FL (ref 82–98)
NUCLEATED RBC (/100WBC) (OHS): 0 /100 WBC
PLATELET # BLD AUTO: 93 K/UL (ref 150–450)
PMV BLD AUTO: 10.5 FL (ref 9.2–12.9)
POTASSIUM SERPL-SCNC: 4.5 MMOL/L (ref 3.5–5.1)
PROT SERPL-MCNC: 7.6 GM/DL (ref 6–8.4)
RBC # BLD AUTO: 3.36 M/UL (ref 4–5.4)
RELATIVE EOSINOPHIL (OHS): 2.9 %
RELATIVE LYMPHOCYTE (OHS): 29.2 % (ref 18–48)
RELATIVE MONOCYTE (OHS): 10.3 % (ref 4–15)
RELATIVE NEUTROPHIL (OHS): 56.7 % (ref 38–73)
SODIUM SERPL-SCNC: 136 MMOL/L (ref 136–145)
WBC # BLD AUTO: 5.52 K/UL (ref 3.9–12.7)

## 2025-03-28 PROCEDURE — 80053 COMPREHEN METABOLIC PANEL: CPT | Performed by: EMERGENCY MEDICINE

## 2025-03-28 PROCEDURE — 63600175 PHARM REV CODE 636 W HCPCS: Performed by: EMERGENCY MEDICINE

## 2025-03-28 PROCEDURE — 86803 HEPATITIS C AB TEST: CPT | Performed by: PHYSICIAN ASSISTANT

## 2025-03-28 PROCEDURE — 25000003 PHARM REV CODE 250: Performed by: PHYSICIAN ASSISTANT

## 2025-03-28 PROCEDURE — 87389 HIV-1 AG W/HIV-1&-2 AB AG IA: CPT | Performed by: PHYSICIAN ASSISTANT

## 2025-03-28 PROCEDURE — 83690 ASSAY OF LIPASE: CPT | Performed by: EMERGENCY MEDICINE

## 2025-03-28 PROCEDURE — 0D9670Z DRAINAGE OF STOMACH WITH DRAINAGE DEVICE, VIA NATURAL OR ARTIFICIAL OPENING: ICD-10-PCS | Performed by: INTERNAL MEDICINE

## 2025-03-28 PROCEDURE — 12000002 HC ACUTE/MED SURGE SEMI-PRIVATE ROOM

## 2025-03-28 PROCEDURE — 25500020 PHARM REV CODE 255: Performed by: STUDENT IN AN ORGANIZED HEALTH CARE EDUCATION/TRAINING PROGRAM

## 2025-03-28 PROCEDURE — 20600001 HC STEP DOWN PRIVATE ROOM

## 2025-03-28 PROCEDURE — 85025 COMPLETE CBC W/AUTO DIFF WBC: CPT | Performed by: EMERGENCY MEDICINE

## 2025-03-28 PROCEDURE — 63600175 PHARM REV CODE 636 W HCPCS: Performed by: PHYSICIAN ASSISTANT

## 2025-03-28 RX ORDER — MORPHINE SULFATE 4 MG/ML
4 INJECTION, SOLUTION INTRAMUSCULAR; INTRAVENOUS
Refills: 0 | Status: COMPLETED | OUTPATIENT
Start: 2025-03-28 | End: 2025-03-28

## 2025-03-28 RX ORDER — DIPHENHYDRAMINE HYDROCHLORIDE 50 MG/ML
12.5 INJECTION, SOLUTION INTRAMUSCULAR; INTRAVENOUS
Status: COMPLETED | OUTPATIENT
Start: 2025-03-28 | End: 2025-03-28

## 2025-03-28 RX ORDER — IBUPROFEN 200 MG
16 TABLET ORAL
Status: DISCONTINUED | OUTPATIENT
Start: 2025-03-28 | End: 2025-03-31 | Stop reason: HOSPADM

## 2025-03-28 RX ORDER — NALOXONE HCL 0.4 MG/ML
0.02 VIAL (ML) INJECTION
Status: DISCONTINUED | OUTPATIENT
Start: 2025-03-28 | End: 2025-03-31 | Stop reason: HOSPADM

## 2025-03-28 RX ORDER — ONDANSETRON HYDROCHLORIDE 2 MG/ML
4 INJECTION, SOLUTION INTRAVENOUS
Status: COMPLETED | OUTPATIENT
Start: 2025-03-28 | End: 2025-03-28

## 2025-03-28 RX ORDER — ENOXAPARIN SODIUM 100 MG/ML
40 INJECTION SUBCUTANEOUS EVERY 24 HOURS
Status: DISCONTINUED | OUTPATIENT
Start: 2025-03-29 | End: 2025-03-31 | Stop reason: HOSPADM

## 2025-03-28 RX ORDER — ONDANSETRON HYDROCHLORIDE 2 MG/ML
4 INJECTION, SOLUTION INTRAVENOUS EVERY 6 HOURS PRN
Status: DISCONTINUED | OUTPATIENT
Start: 2025-03-28 | End: 2025-03-31 | Stop reason: HOSPADM

## 2025-03-28 RX ORDER — GLUCAGON 1 MG
1 KIT INJECTION
Status: DISCONTINUED | OUTPATIENT
Start: 2025-03-28 | End: 2025-03-31 | Stop reason: HOSPADM

## 2025-03-28 RX ORDER — SODIUM CHLORIDE 0.9 % (FLUSH) 0.9 %
10 SYRINGE (ML) INJECTION EVERY 12 HOURS PRN
Status: DISCONTINUED | OUTPATIENT
Start: 2025-03-28 | End: 2025-03-31 | Stop reason: HOSPADM

## 2025-03-28 RX ORDER — SODIUM CHLORIDE, SODIUM LACTATE, POTASSIUM CHLORIDE, CALCIUM CHLORIDE 600; 310; 30; 20 MG/100ML; MG/100ML; MG/100ML; MG/100ML
INJECTION, SOLUTION INTRAVENOUS CONTINUOUS
Status: ACTIVE | OUTPATIENT
Start: 2025-03-29 | End: 2025-03-29

## 2025-03-28 RX ORDER — IBUPROFEN 200 MG
24 TABLET ORAL
Status: DISCONTINUED | OUTPATIENT
Start: 2025-03-28 | End: 2025-03-31 | Stop reason: HOSPADM

## 2025-03-28 RX ADMIN — IOHEXOL 75 ML: 350 INJECTION, SOLUTION INTRAVENOUS at 07:03

## 2025-03-28 RX ADMIN — MORPHINE SULFATE 4 MG: 4 INJECTION INTRAVENOUS at 05:03

## 2025-03-28 RX ADMIN — MORPHINE SULFATE 4 MG: 4 INJECTION INTRAVENOUS at 07:03

## 2025-03-28 RX ADMIN — DIPHENHYDRAMINE HYDROCHLORIDE 12.5 MG: 50 INJECTION, SOLUTION INTRAMUSCULAR; INTRAVENOUS at 08:03

## 2025-03-28 RX ADMIN — ONDANSETRON 4 MG: 2 INJECTION INTRAMUSCULAR; INTRAVENOUS at 05:03

## 2025-03-28 RX ADMIN — SODIUM CHLORIDE 1000 ML: 9 INJECTION, SOLUTION INTRAVENOUS at 05:03

## 2025-03-28 NOTE — TELEPHONE ENCOUNTER
Feels more constipation recently and now abdominal pains similar to partial SBO in past. Has had bm in past few days though and no vomiting. Discussed ED but she would like to avoid. Trying Lactulose and she has our 24/7 emergency number for updates if needed. Denies UTI symptoms and does not feel UA needed. Also notes normal urine analysis last week with UROGYN at [facility].

## 2025-03-28 NOTE — FIRST PROVIDER EVALUATION
Medical screening examination initiated.  I have conducted a focused provider triage encounter, findings are as follows:    Brief history of present illness:  Pt with lower abd pain since this morning    There were no vitals filed for this visit.    Pertinent physical exam:  moderate lower abd pain    Brief workup plan:  abdominal pain workup with CT    Preliminary workup initiated; this workup will be continued and followed by the physician or advanced practice provider that is assigned to the patient when roomed.

## 2025-03-28 NOTE — ED PROVIDER NOTES
Encounter Date: 3/28/2025       History     Chief Complaint   Patient presents with    Abdominal Pain     Lower abd pain,      80-year-old female presenting with abdominal pain.  Patient has a past medical history of chronic abdominal pain with multiple surgeries.  Current episode has been going on for the past 24 hours.  Endorses nausea, no vomiting.  No fever or chills.  No chest pain, no shortness a breath.  Vital signs are reassuring on arrival.      Review of patient's allergies indicates:   Allergen Reactions    Codeine Itching and Nausea And Vomiting    Dilaudid [hydromorphone (bulk)] Other (See Comments)     Oversedating, head burning. Pt prefers to avoid.       Percocet [oxycodone-acetaminophen] Itching    Sulfa (sulfonamide antibiotics) Itching and Nausea And Vomiting    Dilaudid [hydromorphone]     Latex, natural rubber Rash    Opioids - morphine analogues Rash     Past Medical History:   Diagnosis Date    Abdominal pain     Allergic rhinitis     Arthritis     Blood platelet disorder     Blood platelet disorder     Blood transfusion     during delivery and     Bowel obstruction     Cervical radiculopathy     followed by dr cloud    Cirrhosis of liver     Colon cancer     transverse colon; resected; Stage IIA (pT3 pN0 MX)    Degenerative joint disease (DJD) of lumbar spine     Diabetes mellitus     Diarrhea     Falls 2020    Family history of breast cancer     Family history of colon cancer     Fatty liver     GERD (gastroesophageal reflux disease)     History of shingles     Hyperlipidemia     Hypomagnesemia     Hypothyroidism     Irritable bowel syndrome     Microscopic colitis     treated     Migraine     Myelodysplastic syndrome     Raynaud phenomenon     Raynaud's disease     Squamous cell carcinoma of skin     Type 2 diabetes mellitus     Usual hyperplasia of lactiferous duct      Past Surgical History:   Procedure Laterality Date    ADENOIDECTOMY      APPENDECTOMY      BACK  SURGERY      BREAST BIOPSY  1970    BREAST CYST EXCISION  1970?    BREAST LUMPECTOMY  1970    CARPAL TUNNEL RELEASE      bilateral      SECTION      CHOLECYSTECTOMY  1965    COLECTOMY  2011    Transverse colon resection by Dr. Aguirre    COLONOSCOPY N/A 2017    Procedure: COLONOSCOPY;  Surgeon: Manjit Alvarez MD;  Location: [facility] (4TH FLR);  Service: Endoscopy;  Laterality: N/A;    COLONOSCOPY N/A 2019    Procedure: COLONOSCOPY;  Surgeon: Ramiro Jefferson MD;  Location: [facility] (4TH FLR);  Service: Endoscopy;  Laterality: N/A;  PM prep    COLONOSCOPY N/A 2022    Procedure: COLONOSCOPY;  Surgeon: Ramiro Jefferson MD;  Location: [facility] (2ND FLR);  Service: Endoscopy;  Laterality: N/A;  EGD and colonoscopy in 6-8 weeks from now     states has constipation. -extended Golytely prep    CYSTOSCOPY N/A 2021    Procedure: CYSTOSCOPY;  Surgeon: Viridiana Valenzuela MD;  Location: [facility] OR 1ST FLR;  Service: Urology;  Laterality: N/A;    ENDOSCOPIC ULTRASOUND OF UPPER GASTROINTESTINAL TRACT N/A 2018    Procedure: ULTRASOUND, UPPER GI TRACT, ENDOSCOPIC;  Surgeon: Jose Hess MD;  Location: [facility];  Service: Endoscopy;  Laterality: N/A;    ENDOSCOPIC ULTRASOUND OF UPPER GASTROINTESTINAL TRACT N/A 2019    Procedure: ULTRASOUND, UPPER GI TRACT, ENDOSCOPIC;  Surgeon: Jose Hess MD;  Location: [facility] (2ND FLR);  Service: Endoscopy;  Laterality: N/A;    ENDOSCOPIC ULTRASOUND OF UPPER GASTROINTESTINAL TRACT N/A 10/25/2023    Procedure: ULTRASOUND, UPPER GI TRACT, ENDOSCOPIC;  Surgeon: Jose Campos MD;  Location: [facility];  Service: Endoscopy;  Laterality: N/A;  10/20/23: instructions sent via portal-GD    ESOPHAGOGASTRODUODENOSCOPY N/A 2018    Procedure: EGD (ESOPHAGOGASTRODUODENOSCOPY);  Surgeon: Angelo Reynolds MD;  Location: [facility] (2ND FLR);  Service: Endoscopy;  Laterality: N/A;    ESOPHAGOGASTRODUODENOSCOPY N/A 2019    Procedure:  EGD (ESOPHAGOGASTRODUODENOSCOPY);  Surgeon: Ramiro Jefferson MD;  Location: [facility] (4TH FLR);  Service: Endoscopy;  Laterality: N/A;    ESOPHAGOGASTRODUODENOSCOPY N/A 05/04/2022    Procedure: EGD (ESOPHAGOGASTRODUODENOSCOPY);  Surgeon: Ramiro Jefferson MD;  Location: [facility] (2ND FLR);  Service: Endoscopy;  Laterality: N/A;  fully vaccinated-GT    ESOPHAGOGASTRODUODENOSCOPY N/A 10/25/2023    Procedure: EGD (ESOPHAGOGASTRODUODENOSCOPY);  Surgeon: Jose Campos MD;  Location: [facility];  Service: Endoscopy;  Laterality: N/A;    ESOPHAGOGASTRODUODENOSCOPY N/A 11/15/2023    Procedure: ESOPHAGOGASTRODUODENOSCOPY (EGD);  Surgeon: Db Sanchez MD;  Location: [facility] (4TH FLR);  Service: Endoscopy;  Laterality: N/A;  New diagnosis of esophageal varices. Not a candidate for beta blocker due to soft blood pressures. Recommend EV banding.  PT/INR done on 11/3/23 and CBC done on 9/30/23  ok per Dr. Espinoza to be done by 1st available provider-see tele encounter-st  1    EYE SURGERY      Cataract Removal    FLUOROSCOPIC URODYNAMIC STUDY N/A 11/09/2021    Procedure: URODYNAMIC STUDY, FLUOROSCOPIC;  Surgeon: Viridiana Valenzuela MD;  [facility];  Service: Urology;  Laterality: N/A;  90 minutes     HYSTERECTOMY      JOINT REPLACEMENT      OOPHORECTOMY  1970    posterolateral fusion with autograft bone and Eun mineralized bone matrix  02/01/2013    at [facility] for lumbar spine stenosis    SMALL INTESTINE SURGERY      SPINE SURGERY      TOE SURGERY      TONSILLECTOMY      TRANSFORAMINAL EPIDURAL INJECTION OF STEROID Bilateral 12/21/2022    Procedure: Injection,steroid,epidural, Todd L5/S1 TF CONTRAST DIRECT REFERRAL;  Surgeon: Toni Osorio MD;  Location: [facility] PAIN MGT;  Service: Pain Management;  Laterality: Bilateral;    TRIGGER FINGER RELEASE       Family History   Problem Relation Name Age of Onset    Heart disease Father August 50        Mi age 50    Colon cancer Father August     Bladder Cancer  GOAL: Pt will improve b/l  (UE/LE) strength by at least 1/2 MMT grade within 2-3 weeks to assist with performing functional mobility and ADLs. Mother Mariana         non smoker    Cataracts Mother Mariana     Glaucoma Mother Mariana     Heart disease Mother Mariana     Hyperlipidemia Mother Mariana     Kidney disease Mother Mariana     Breast cancer Sister Sr. Damaris Dye 79    Arthritis Daughter Wendy     Asthma Daughter Wendy     Depression Daughter Wendy     Hypertension Daughter Wendy     Stroke Daughter Wendy 40    Arthritis Brother Timoteo     Colon cancer Brother Timoteo 70    Alcohol abuse Brother Timoteo     Parkinsonism Brother Ronnie     Alcohol abuse Brother Ronnie     Depression Daughter Danielle     Stroke Daughter Danielle     Alcohol abuse Brother Harjeet     Arthritis Brother Harjeet     Asthma Daughter Kimberly     Depression Daughter Kimberly     Celiac disease Neg Hx      Cirrhosis Neg Hx      Colon polyps Neg Hx      Crohn's disease Neg Hx      Cystic fibrosis Neg Hx      Esophageal cancer Neg Hx      Hemochromatosis Neg Hx      Inflammatory bowel disease Neg Hx      Irritable bowel syndrome Neg Hx      Liver cancer Neg Hx      Liver disease Neg Hx      Rectal cancer Neg Hx      Stomach cancer Neg Hx      Ulcerative colitis Neg Hx      Eliazar's disease Neg Hx      Amblyopia Neg Hx      Blindness Neg Hx      Macular degeneration Neg Hx      Retinal detachment Neg Hx      Strabismus Neg Hx      Melanoma Neg Hx       Social History[1]  Review of Systems   Constitutional:  Negative for fever.   HENT:  Negative for sore throat.    Respiratory:  Negative for shortness of breath.    Cardiovascular:  Negative for chest pain.   Gastrointestinal:  Positive for abdominal pain and nausea. Negative for anal bleeding and vomiting.   Genitourinary:  Negative for dysuria.   Musculoskeletal:  Negative for back pain.   Skin:  Negative for rash.   Neurological:  Negative for weakness.   Hematological:  Does not bruise/bleed easily.       Physical Exam     Initial Vitals [03/28/25 1617]   BP Pulse Resp Temp SpO2   (!) 124/90 90 20 98.1 °F (36.7 °C) 97 %      MAP       --          Physical Exam    Constitutional: Vital signs are normal. She appears well-developed and well-nourished.   HENT:   Head: Normocephalic and atraumatic.   Right Ear: Hearing normal.   Left Ear: Hearing normal.   Eyes: Conjunctivae are normal.   Cardiovascular:  Normal rate and regular rhythm.           Abdominal: Abdomen is soft. Bowel sounds are normal. There is abdominal tenderness.   Diffuse, non focal.    Musculoskeletal:         General: Normal range of motion.     Neurological: She is alert and oriented to person, place, and time.   Skin: Skin is warm and intact.   Psychiatric: She has a normal mood and affect. Her speech is normal and behavior is normal. Cognition and memory are normal.         ED Course   Procedures  Labs Reviewed   COMPREHENSIVE METABOLIC PANEL - Abnormal       Result Value    Sodium 136      Potassium 4.5      Chloride 107      CO2 20 (*)     Glucose 141 (*)     BUN 11      Creatinine 1.0      Calcium 10.2      Protein Total 7.6      Albumin 4.0      Bilirubin Total 0.9      ALP 74      AST 27      ALT 21      Anion Gap 9      eGFR 56 (*)    CBC WITH DIFFERENTIAL - Abnormal    WBC 5.52      RBC 3.36 (*)     HGB 11.5 (*)     HCT 35.5 (*)      (*)     MCH 34.2      MCHC 32.4      RDW 13.7      Platelet Count 93 (*)     MPV 10.5      Nucleated RBC 0      Neut % 56.7      Lymph % 29.2      Mono % 10.3      Eos % 2.9      Basophil % 0.5      Imm Grans % 0.4      Neut # 3.13      Lymph # 1.61      Mono # 0.57      Eos # 0.16      Baso # 0.03      Imm Grans # 0.02     LIPASE - Normal    Lipase Level 23     HEPATITIS C ANTIBODY - Normal    Hep C Ab Interp Non-Reactive     HIV 1 / 2 ANTIBODY - Normal    HIV 1/2 Ag/Ab Non-Reactive     CBC W/ AUTO DIFFERENTIAL    Narrative:     The following orders were created for panel order CBC auto differential.  Procedure                               Abnormality         Status                     ---------                               -----------          ------                     CBC with Differential[[id number]]       Abnormal            Final result                 Please view results for these tests on the individual orders.   URINALYSIS, REFLEX TO URINE CULTURE          Imaging Results               CT Abdomen Pelvis With IV Contrast NO Oral Contrast (Final result)  Result time 03/28/25 19:44:34      Final result by Duglas Alacntara MD (03/28/25 19:44:34)                   Impression:      This report was flagged in Epic as abnormal.    1. There is surgical change of partial small-bowel resection noting the anastomoses appear patent.  There are multiple dilated proximal to mid small bowel loops noting relative decompression of the distal small bowel.  No convincing transition point is identified however developing high-grade obstruction on the basis of adhesion is a consideration.  No findings to suggest pneumatosis or free air at this time.  Correlation and follow-up is advised.  2. Findings suggest hepatic steatosis, correlation with LFTs recommended.  3. Prominent abdominal lymph nodes, similar to the previous exam, possibly reactive.  4. Please see above for several additional findings.      Electronically signed by: Duglas Alcantara MD  Date:    03/28/2025  Time:    19:44               Narrative:    EXAMINATION:  CT ABDOMEN PELVIS WITH IV CONTRAST    CLINICAL HISTORY:  Abdominal pain, acute, nonlocalized;    TECHNIQUE:  Low dose axial images, sagittal and coronal reformations were obtained from the lung bases to the pubic symphysis following the IV administration of 75 mL of Omnipaque 350 .  Oral contrast was not given.    COMPARISON:  CT 02/11/2025    FINDINGS:  Images of the lower thorax are remarkable for a 2-3 mm pulmonary nodule within the anterior aspect of the right upper lobe.  There is bilateral basilar dependent atelectasis.    The liver is hypoattenuating, may reflect underlying steatosis, correlation with LFTs recommended.  The spleen and  adrenal glands are unremarkable.  There is atrophic change of the pancreas without pancreatic ductal dilation.  The gallbladder is surgically absent, no significant biliary dilation status post cholecystectomy.  The stomach is distended with fluid.  There is thickening at the pylorus, may be on the basis of peristalsis.  The portal vein, splenic vein, SMV, celiac axis and SMA all are patent.  There are a few scattered prominent abdominal lymph nodes particularly in the olga hepatic and gastrohepatic regions.    The kidneys enhance symmetrically without nephrolithiasis.  Low attenuating lesions arise from the right kidney, too small for characterization.  No left hydronephrosis.  There is mild right hydronephrosis.  The bilateral ureters are otherwise unremarkable without convincing calculi seen.  The urinary bladder is distended without wall thickening.  The uterus is absent the adnexa is unremarkable.    There is moderate to large amount of stool throughout the colon.  The terminal ileum is unremarkable.  The appendix is not identified.  There is liquid stool within the cecum.  There is decompression of the distal small bowel.  There is fluid distension of the proximal to mid small bowel noting scattered regions of wall thickening.  Several small bowel loops are closely tethered to the anterior abdominal wall.  There are surgical changes of small bowel anastomoses, the anastomoses appear patent.  No findings to suggest pneumatosis.  No free air.  There are several scattered shotty periaortic, pericaval, and mesenteric lymph nodes.  There is atherosclerotic calcification of the aorta and its branches.  No convincing focal organized pelvic fluid collection.    There are degenerative changes of the bilateral femoroacetabular joints, pubic symphysis, sacroiliac joints and spine.  There is grade 1/grade 2 anterolisthesis of L5 on S1 noting bilateral pars defects at L5.  No significant inguinal lymphadenopathy.                                        Medications   morphine injection 4 mg (4 mg Intravenous Given 3/28/25 1744)   ondansetron injection 4 mg (4 mg Intravenous Given 3/28/25 1743)   sodium chloride 0.9% bolus 1,000 mL 1,000 mL (0 mLs Intravenous Stopped 3/28/25 2044)   iohexoL (OMNIPAQUE 350) injection 75 mL (75 mLs Intravenous Given 3/28/25 1903)   morphine injection 4 mg (4 mg Intravenous Given 3/28/25 1953)   diphenhydrAMINE injection 12.5 mg (12.5 mg Intravenous Given 3/28/25 2048)     Medical Decision Making  80-year-old female presents with abdominal pain and nausea   Differential:  Gastroenteritis, gastritis, small bowel obstruction, ileus, diverticulitis, diverticulosis    8:30 p.m. update   No acute findings on labs   CT scan indicates a partial SBO   Patient does not want to consider surgery as an option secondary to her history.  We will place an NG tube to suction, I will consult General surgery for recommendations.  This does not appear to be surgical and I will discuss with Hospital Medicine and case management for admission.    Patient was treated with morphine on arrival for pain and had no complications.  She was given a 2nd dose of morphine and has developed hives at the peripheral IV site.  This resolved with Benadryl.  She denied any trouble breathing or swallowing.  No throat closing sensation.        Risk  Prescription drug management.  Decision regarding hospitalization.                                      Clinical Impression:  Final diagnoses:  [K56.609] SBO (small bowel obstruction) (Primary)          ED Disposition Condition    Admit Stable                    [1]   Social History  Tobacco Use    Smoking status: Never    Smokeless tobacco: Never   Substance Use Topics    Alcohol use: Not Currently     Comment: socially, rarely    Drug use: Never        Duglas Joshi PA-C  03/28/25 2059

## 2025-03-28 NOTE — TELEPHONE ENCOUNTER
----- Message from Nurse Weathers sent at 3/28/2025 12:59 PM CDT -----    ----- Message -----  From: India Melgar  Sent: 3/28/2025  11:40 AM CDT  To: Jerri Lopez    Good morning, Mrs. Judd has been experiencing intermittent abdominal pain, but this morning, after eating oatmeal, it became really severe. Are you able to give her a call to discuss?She can be reached at [phone].Thanks for all you do, [place]

## 2025-03-28 NOTE — TELEPHONE ENCOUNTER
Patient reports abdominal pain and just spoke with her PCP but the pain has continued. She also reports a rash to her back. Lower abdomen has constant pain. Pain is currently 10/10. Patient denies N/V/D. Last BM this morning. Disposition provided - go to ER now. She requested to notify Dr Espinoza, will route message. Instructed to call back with additional questions or worsening of symptoms. Patient verbalized understanding.     Reason for Disposition   SEVERE abdominal pain (e.g., excruciating)    Additional Information   Negative: Passed out (e.g., fainted, lost consciousness, blacked out and was not responding)   Negative: Shock suspected (e.g., cold/pale/clammy skin, too weak to stand, low BP, rapid pulse)   Negative: Sounds like a life-threatening emergency to the triager    Protocols used: Abdominal Pain - Female-A-[place]

## 2025-03-28 NOTE — Clinical Note
Diagnosis: SBO (small bowel obstruction) [089129]   Reason for IP Medical Treatment  (Clinical interventions that can only be accomplished in the IP setting? ) :: SBO

## 2025-03-29 LAB
ABSOLUTE EOSINOPHIL (OHS): 0.2 K/UL
ABSOLUTE MONOCYTE (OHS): 0.62 K/UL (ref 0.3–1)
ABSOLUTE NEUTROPHIL COUNT (OHS): 4.39 K/UL (ref 1.8–7.7)
ALBUMIN SERPL BCP-MCNC: 3.4 G/DL (ref 3.5–5.2)
ALP SERPL-CCNC: 94 UNIT/L (ref 40–150)
ALT SERPL W/O P-5'-P-CCNC: 75 UNIT/L (ref 10–44)
ANION GAP (OHS): 9 MMOL/L (ref 8–16)
AST SERPL-CCNC: 184 UNIT/L (ref 11–45)
BASOPHILS # BLD AUTO: 0.02 K/UL
BASOPHILS NFR BLD AUTO: 0.3 %
BILIRUB SERPL-MCNC: 1.5 MG/DL (ref 0.1–1)
BUN SERPL-MCNC: 10 MG/DL (ref 8–23)
CALCIUM SERPL-MCNC: 9.7 MG/DL (ref 8.7–10.5)
CHLORIDE SERPL-SCNC: 106 MMOL/L (ref 95–110)
CO2 SERPL-SCNC: 20 MMOL/L (ref 23–29)
CREAT SERPL-MCNC: 1.1 MG/DL (ref 0.5–1.4)
EAG (OHS): 117 MG/DL (ref 68–131)
ERYTHROCYTE [DISTWIDTH] IN BLOOD BY AUTOMATED COUNT: 13.8 % (ref 11.5–14.5)
GFR SERPLBLD CREATININE-BSD FMLA CKD-EPI: 50 ML/MIN/1.73/M2
GLUCOSE SERPL-MCNC: 135 MG/DL (ref 70–110)
HBA1C MFR BLD: 5.7 % (ref 4–5.6)
HCT VFR BLD AUTO: 34.8 % (ref 37–48.5)
HGB BLD-MCNC: 11.2 GM/DL (ref 12–16)
IMM GRANULOCYTES # BLD AUTO: 0.02 K/UL (ref 0–0.04)
IMM GRANULOCYTES NFR BLD AUTO: 0.3 % (ref 0–0.5)
LYMPHOCYTES # BLD AUTO: 1.42 K/UL (ref 1–4.8)
MAGNESIUM SERPL-MCNC: 1.4 MG/DL (ref 1.6–2.6)
MCH RBC QN AUTO: 34.7 PG (ref 27–50)
MCHC RBC AUTO-ENTMCNC: 32.2 G/DL (ref 32–36)
MCV RBC AUTO: 108 FL (ref 82–98)
NUCLEATED RBC (/100WBC) (OHS): 0 /100 WBC
PHOSPHATE SERPL-MCNC: 3.5 MG/DL (ref 2.7–4.5)
PLATELET # BLD AUTO: 80 K/UL (ref 150–450)
PMV BLD AUTO: 10.5 FL (ref 9.2–12.9)
POCT GLUCOSE: 102 MG/DL (ref 70–110)
POCT GLUCOSE: 104 MG/DL (ref 70–110)
POCT GLUCOSE: 95 MG/DL (ref 70–110)
POTASSIUM SERPL-SCNC: 4.5 MMOL/L (ref 3.5–5.1)
PROT SERPL-MCNC: 6.7 GM/DL (ref 6–8.4)
RBC # BLD AUTO: 3.23 M/UL (ref 4–5.4)
RELATIVE EOSINOPHIL (OHS): 3 %
RELATIVE LYMPHOCYTE (OHS): 21.3 % (ref 18–48)
RELATIVE MONOCYTE (OHS): 9.3 % (ref 4–15)
RELATIVE NEUTROPHIL (OHS): 65.8 % (ref 38–73)
SODIUM SERPL-SCNC: 135 MMOL/L (ref 136–145)
WBC # BLD AUTO: 6.67 K/UL (ref 3.9–12.7)

## 2025-03-29 PROCEDURE — 25000003 PHARM REV CODE 250

## 2025-03-29 PROCEDURE — 84100 ASSAY OF PHOSPHORUS: CPT

## 2025-03-29 PROCEDURE — 63600175 PHARM REV CODE 636 W HCPCS

## 2025-03-29 PROCEDURE — 85025 COMPLETE CBC W/AUTO DIFF WBC: CPT

## 2025-03-29 PROCEDURE — 83036 HEMOGLOBIN GLYCOSYLATED A1C: CPT

## 2025-03-29 PROCEDURE — 20600001 HC STEP DOWN PRIVATE ROOM

## 2025-03-29 PROCEDURE — 83735 ASSAY OF MAGNESIUM: CPT

## 2025-03-29 PROCEDURE — 36415 COLL VENOUS BLD VENIPUNCTURE: CPT

## 2025-03-29 PROCEDURE — 82040 ASSAY OF SERUM ALBUMIN: CPT

## 2025-03-29 RX ORDER — INSULIN ASPART 100 [IU]/ML
0-5 INJECTION, SOLUTION INTRAVENOUS; SUBCUTANEOUS EVERY 6 HOURS PRN
Status: DISCONTINUED | OUTPATIENT
Start: 2025-03-29 | End: 2025-03-31 | Stop reason: HOSPADM

## 2025-03-29 RX ORDER — LIDOCAINE 50 MG/G
2 PATCH TOPICAL DAILY
Status: DISCONTINUED | OUTPATIENT
Start: 2025-03-29 | End: 2025-03-31 | Stop reason: HOSPADM

## 2025-03-29 RX ORDER — LEVOTHYROXINE SODIUM 75 UG/1
75 TABLET ORAL
Status: DISCONTINUED | OUTPATIENT
Start: 2025-03-29 | End: 2025-03-29

## 2025-03-29 RX ORDER — DULOXETIN HYDROCHLORIDE 30 MG/1
30 CAPSULE, DELAYED RELEASE ORAL 2 TIMES DAILY
Status: DISCONTINUED | OUTPATIENT
Start: 2025-03-29 | End: 2025-03-29

## 2025-03-29 RX ORDER — LIDOCAINE 50 MG/G
1 PATCH TOPICAL DAILY
Status: DISCONTINUED | OUTPATIENT
Start: 2025-03-29 | End: 2025-03-29

## 2025-03-29 RX ORDER — LEVOTHYROXINE SODIUM 20 UG/ML
50 INJECTION, SOLUTION INTRAVENOUS
Status: DISCONTINUED | OUTPATIENT
Start: 2025-03-30 | End: 2025-03-30

## 2025-03-29 RX ORDER — GABAPENTIN 300 MG/1
300 CAPSULE ORAL 3 TIMES DAILY PRN
Status: DISCONTINUED | OUTPATIENT
Start: 2025-03-29 | End: 2025-03-31

## 2025-03-29 RX ADMIN — SODIUM CHLORIDE, POTASSIUM CHLORIDE, SODIUM LACTATE AND CALCIUM CHLORIDE: 600; 310; 30; 20 INJECTION, SOLUTION INTRAVENOUS at 01:03

## 2025-03-29 RX ADMIN — LIDOCAINE 2 PATCH: 50 PATCH CUTANEOUS at 09:03

## 2025-03-29 RX ADMIN — ENOXAPARIN SODIUM 40 MG: 40 INJECTION SUBCUTANEOUS at 04:03

## 2025-03-29 NOTE — PROGRESS NOTES
[facility]  General Surgery  Progress Note    Subjective:     History of Present Illness:  Anayeli Judd is an 80yoF with a past medical history significant for diabetes, reflux and an extensive intra-abdominal surgical history and a history of recurrent partial small bowel obstructions. She presents to the emergency room with complaints of nausea, abdominal pain, and PO aversion. She states that she has periumbilical abdominal discomfort. She denies any episodes of emesis. Her last bowel movement was yesterday. She endorses flatus today. She is hemodynamically stable in the emergency department. Her labs are largely unremarkable. Her CT scan demonstrates dilated stomach and proximal small intestine. There is no transition point appreciated. General surgery consulted for evaluation.     At the time of my exam, the patient is resting comfortably in bed. Her abdominal exam is benign. I discussed her imaging findings and the plan of care.     Post-Op Info:  * No surgery found *         Interval History: NAEON.  Patient doing well this morning.  NG tube flushed on rounds this morning.  Reports having some flatus.  No current nausea or vomiting.    Medications:  Continuous Infusions:   lactated ringers   Intravenous Continuous 75 mL/hr at 03/29/25 0116 New Bag at 03/29/25 0116     Scheduled Meds:   enoxparin  40 mg Subcutaneous Q24H (prophylaxis, 1700)    [START ON 3/30/2025] levothyroxine  50 mcg Intravenous Before breakfast    LIDOcaine  2 patch Transdermal Daily     PRN Meds:  Current Facility-Administered Medications:     dextrose 50%, 12.5 g, Intravenous, PRN    dextrose 50%, 25 g, Intravenous, PRN    gabapentin, 300 mg, Oral, TID PRN    glucagon (human recombinant), 1 mg, Intramuscular, PRN    glucose, 16 g, Oral, PRN    glucose, 24 g, Oral, PRN    insulin aspart U-100, 0-5 Units, Subcutaneous, Q6H PRN    naloxone, 0.02 mg, Intravenous, PRN    ondansetron, 4 mg, Intravenous, Q6H PRN    sodium chloride 0.9%, 10  mL, Intravenous, Q12H PRN     Review of patient's allergies indicates:   Allergen Reactions    Codeine Itching and Nausea And Vomiting    Dilaudid [hydromorphone (bulk)] Other (See Comments)     Oversedating, head burning. Pt prefers to avoid.       Percocet [oxycodone-acetaminophen] Itching    Sulfa (sulfonamide antibiotics) Itching and Nausea And Vomiting    Dilaudid [hydromorphone]     Latex, natural rubber Rash    Opioids - morphine analogues Rash     Objective:     Vital Signs (Most Recent):  Temp: 98.1 °F (36.7 °C) (03/29/25 0747)  Pulse: 80 (03/29/25 0747)  Resp: 18 (03/29/25 0747)  BP: 108/63 (03/29/25 0747)  SpO2: 95 % (03/29/25 0747) Vital Signs (24h Range):  Temp:  [97.7 °F (36.5 °C)-98.3 °F (36.8 °C)] 98.1 °F (36.7 °C)  Pulse:  [65-90] 80  Resp:  [12-20] 18  SpO2:  [93 %-97 %] 95 %  BP: (102-147)/(56-90) 108/63     Weight: 58.1 kg (128 lb)  Body mass index is 23.41 kg/m².    Intake/Output - Last 3 Shifts         03/27 0700  03/28 0659 03/28 0700  03/29 0659 03/29 0700  03/30 0659    IV Piggyback  1000     Total Intake(mL/kg)  1000 (17.2)     Drains  300     Total Output  300     Net  +700                     Physical Exam  Vitals and nursing note reviewed.   Constitutional:       General: She is not in acute distress.     Appearance: Normal appearance.   HENT:      Nose: Nose normal.   Cardiovascular:      Rate and Rhythm: Normal rate and regular rhythm.   Pulmonary:      Effort: Pulmonary effort is normal. No respiratory distress.   Abdominal:      Comments: Well healed surgical scar  Soft, non-tender, non-distended   Musculoskeletal:         General: Normal range of motion.   Skin:     General: Skin is warm and dry.   Neurological:      General: No focal deficit present.      Mental Status: She is alert.          Significant Labs:  I have reviewed all pertinent lab results within the past 24 hours.  CBC:   Recent Labs   Lab 03/29/25  0537   WBC 6.67   RBC 3.23*   HGB 11.2*   HCT 34.8*   PLT 80*   MCV  108*   MCH 34.7   MCHC 32.2     CMP:   Recent Labs   Lab 03/29/25  0537   CALCIUM 9.7   ALBUMIN 3.4*   *   K 4.5   CO2 20*      BUN 10   CREATININE 1.1   ALKPHOS 94   ALT 75*   *   BILITOT 1.5*       Significant Diagnostics:  I have reviewed all pertinent imaging results/findings within the past 24 hours.  Assessment/Plan:     Generalized abdominal pain  Anayeli Judd is an 80yoF with a significant past intra-abdominal surgical history who presents to the ED with complaints of abdominal pain, nausea, and PO aversion. General surgery consulted for evaluation with concern for an SBO.  CT without obvious transition point.  Patient currently having flatus, denies nausea or vomiting with NG tube to suction. The patient adamantly voices that she would not agree to surgery currently. When posed if it was an operation in order to save her life, she states that 'she would have to think about it.' No acute surgical intervention indicated at this time.    - patient seen and examined  - NPO, mIVF, okay for ice chips  - continue NG tube decompression  - rest of care per medicine team  - please call with questions        Juan Pablo Ziegler MD  General Surgery  [facility]

## 2025-03-29 NOTE — ASSESSMENT & PLAN NOTE
Anayeli Judd is an 80yoF with a significant past intra-abdominal surgical history who presents to the ED with complaints of abdominal pain, nausea, and PO aversion. General surgery consulted for evaluation with concern for an SBO.  CT without obvious transition point.  Patient currently having flatus, denies nausea or vomiting with NG tube to suction. The patient adamantly voices that she would not agree to surgery currently. When posed if it was an operation in order to save her life, she states that 'she would have to think about it.' No acute surgical intervention indicated at this time.    - patient seen and examined  - NPO, mIVF, okay for ice chips  - continue NG tube decompression  - rest of care per medicine team  - please call with questions

## 2025-03-29 NOTE — HPI
Anayeli Judd is an 80yoF with a past medical history significant for diabetes, reflux and an extensive intra-abdominal surgical history and a history of recurrent partial small bowel obstructions. She presents to the emergency room with complaints of nausea, abdominal pain, and PO aversion. She states that she has periumbilical abdominal discomfort. She denies any episodes of emesis. Her last bowel movement was yesterday. She endorses flatus today. She is hemodynamically stable in the emergency department. Her labs are largely unremarkable. Her CT scan demonstrates dilated stomach and proximal small intestine. There is no transition point appreciated. General surgery consulted for evaluation.     At the time of my exam, the patient is resting comfortably in bed. Her abdominal exam is benign. I discussed her imaging findings and the plan of care.

## 2025-03-29 NOTE — SUBJECTIVE & OBJECTIVE
No current facility-administered medications on file prior to encounter.     Current Outpatient Medications on File Prior to Encounter   Medication Sig    acetaminophen (TYLENOL) 650 MG TbSR Take 1,300 mg by mouth 2 (two) times a day.    ascorbic acid, vitamin C, (VITAMIN C) 1000 MG tablet Take 1,000 mg by mouth once daily.    azelastine (ASTELIN) 137 mcg (0.1 %) nasal spray 1 spray (137 mcg total) by Nasal route 2 (two) times daily.    biotin 10,000 mcg TbDL Take 1 tablet by mouth once daily.     blood sugar diagnostic (FREESTYLE LITE STRIPS) Strp 1 strip by Misc.(Non-Drug; Combo Route) route 3 (three) times daily.    blood-glucose meter kit Use as instructed    calcium-vitamin D3 (OS-KASSANDRA 500 + D3) 500 mg-5 mcg (200 unit) per tablet Take 1 tablet by mouth once daily.    cholestyramine (QUESTRAN) 4 gram packet Take 1 packet (4 g total) by mouth 3 (three) times daily with meals.    cranberry extract 200 mg Cap Take 1 capsule by mouth once daily.    diphenoxylate-atropine 2.5-0.025 mg (LOMOTIL) 2.5-0.025 mg per tablet Take 1 tablet by mouth 3 (three) times daily as needed for Diarrhea.    donepeziL (ARICEPT) 10 MG tablet TAKE 1 TABLET(10 MG) BY MOUTH EVERY EVENING    DULoxetine (CYMBALTA) 30 MG capsule Take 1 capsule (30 mg total) by mouth 2 (two) times daily.    ferrous sulfate 324 mg (65 mg iron) TbEC Take 1 tablet (324 mg total) by mouth once daily.    flash glucose scanning reader (FREESTYLE ERFEN 14 DAY READER) Misc Check blood glucose level 3 times daily.    flash glucose sensor (FREESTYLE EFREN 14 DAY SENSOR) Kit APPLY 1 SENSOR EVERY 14 DAYS    gabapentin (NEURONTIN) 300 MG capsule Take 1 to 2 capsules 3 times daily if needed for leg pain.    HYDROcodone-acetaminophen (NORCO) 5-325 mg per tablet Take 1 tablet by mouth every 6 (six) hours as needed for Pain.    hydrocortisone 2.5 % cream Apply topically 2 (two) times daily as needed.    lactulose (CHRONULAC) 20 gram/30 mL Soln Take 30 mLs (20 g total) by mouth 3  (three) times daily as needed (Constipation).    lancets (FREESTYLE LANCETS) 28 gauge Misc 30 lancets by Misc.(Non-Drug; Combo Route) route Daily.    levothyroxine (SYNTHROID) 75 MCG tablet TAKE 1 TABLET(75 MCG) BY MOUTH BEFORE BREAKFAST    levothyroxine (SYNTHROID) 75 MCG tablet TAKE 1 TABLET BY MOUTH BEFORE  BREAKFAST    LIDOcaine (LIDODERM) 5 % APPLY 1 TO 3 PATCHES TO BACK EVERY DAY. REMOVE WITHIN 12 HOURS    linaGLIPtin (TRADJENTA) 5 mg Tab tablet TAKE 1 TABLET(5 MG) BY MOUTH EVERY DAY    loperamide (IMODIUM) 2 mg capsule Take 1 capsule (2 mg total) by mouth 2 (two) times daily as needed for Diarrhea.    loperamide (IMODIUM) 2 mg capsule Take 1 capsule (2 mg total) by mouth 4 (four) times daily as needed for Diarrhea.    magnesium 30 mg Tab Take 30 mg by mouth once. Patient does not know actual dose in MG    magnesium citrate solution Take 300 mLs by mouth.    metFORMIN (GLUCOPHAGE) 1000 MG tablet TAKE 1 TABLET(1000 MG) BY MOUTH TWICE DAILY WITH MEALS    mometasone (ELOCON) 0.1 % ointment Apply topically 2 (two) times a day. Mix 50/50 with mupirocin ointment. Apply in each nostril twice daily.    montelukast (SINGULAIR) 10 mg tablet Take 1 tablet (10 mg total) by mouth once daily.    ondansetron (ZOFRAN-ODT) 8 MG TbDL Take 1 tablet (8 mg total) by mouth every 8 (eight) hours as needed (nausea).    ospemifene (OSPHENA) 60 mg Tab Take by mouth.    pantoprazole (PROTONIX) 20 MG tablet TAKE 1 TABLET(20 MG) BY MOUTH TWICE DAILY    polyethylene glycol (GLYCOLAX) 17 gram PwPk Take 17 g by mouth once daily.    pregabalin (LYRICA) 100 MG capsule Take 100 mg by mouth.    PREMARIN vaginal cream INSERT 0.5 GRAM VAGINALLY 2 TIMES A WEEK    rifAXIMin (XIFAXAN) 550 mg Tab Take 1 tablet (550 mg total) by mouth 2 (two) times daily.    traZODone (DESYREL) 50 MG tablet TAKE 1 TABLET(50 MG) BY MOUTH EVERY EVENING    triamcinolone acetonide 0.1% (KENALOG) 0.1 % paste 3 (three) times daily.    ubrogepant (UBRELVY) 100 mg tablet Take  1 tablet daily as needed for migraine headache. May take 1 additional tablet 2 hours later if needed.    valACYclovir (VALTREX) 1000 MG tablet Take 1 tablet (1,000 mg total) by mouth once daily.    [DISCONTINUED] hyoscyamine (ANASPAZ,LEVSIN) 0.125 mg Tab TAKE 1 TABLET(125 MCG) BY MOUTH EVERY 8 HOURS AS NEEDED FOR URGENCY       Review of patient's allergies indicates:   Allergen Reactions    Codeine Itching and Nausea And Vomiting    Dilaudid [hydromorphone (bulk)] Other (See Comments)     Oversedating, head burning. Pt prefers to avoid.       Percocet [oxycodone-acetaminophen] Itching    Sulfa (sulfonamide antibiotics) Itching and Nausea And Vomiting    Dilaudid [hydromorphone]     Latex, natural rubber Rash    Opioids - morphine analogues Rash       Past Medical History:   Diagnosis Date    Abdominal pain     Allergic rhinitis     Arthritis     Blood platelet disorder     Blood platelet disorder     Blood transfusion     during delivery and     Bowel obstruction     Cervical radiculopathy     followed by dr cloud    Cirrhosis of liver     Colon cancer     transverse colon; resected; Stage IIA (pT3 pN0 MX)    Degenerative joint disease (DJD) of lumbar spine     Diabetes mellitus     Diarrhea     Falls 2020    Family history of breast cancer     Family history of colon cancer     Fatty liver     GERD (gastroesophageal reflux disease)     History of shingles     Hyperlipidemia     Hypomagnesemia     Hypothyroidism     Irritable bowel syndrome     Microscopic colitis     treated     Migraine     Myelodysplastic syndrome     Raynaud phenomenon     Raynaud's disease     Squamous cell carcinoma of skin     Type 2 diabetes mellitus     Usual hyperplasia of lactiferous duct      Past Surgical History:   Procedure Laterality Date    ADENOIDECTOMY      APPENDECTOMY      BACK SURGERY      BREAST BIOPSY      BREAST CYST EXCISION  ?    BREAST LUMPECTOMY  1970    CARPAL TUNNEL RELEASE       bilateral      SECTION      CHOLECYSTECTOMY  1965    COLECTOMY  2011    Transverse colon resection by Dr. Aguirre    COLONOSCOPY N/A 2017    Procedure: COLONOSCOPY;  Surgeon: Manjit Alvarez MD;  Location: [facility] (4TH FLR);  Service: Endoscopy;  Laterality: N/A;    COLONOSCOPY N/A 2019    Procedure: COLONOSCOPY;  Surgeon: Ramiro Jefferson MD;  Location: [facility] (4TH FLR);  Service: Endoscopy;  Laterality: N/A;  PM prep    COLONOSCOPY N/A 2022    Procedure: COLONOSCOPY;  Surgeon: Ramiro Jefferson MD;  Location: [facility] (2ND FLR);  Service: Endoscopy;  Laterality: N/A;  EGD and colonoscopy in 6-8 weeks from now     states has constipation. -extended Golytely prep    CYSTOSCOPY N/A 2021    Procedure: CYSTOSCOPY;  Surgeon: Viridiana Valenzuela MD;  Location: [facility] OR 1ST FLR;  Service: Urology;  Laterality: N/A;    ENDOSCOPIC ULTRASOUND OF UPPER GASTROINTESTINAL TRACT N/A 2018    Procedure: ULTRASOUND, UPPER GI TRACT, ENDOSCOPIC;  Surgeon: Jose Hess MD;  Location: [facility];  Service: Endoscopy;  Laterality: N/A;    ENDOSCOPIC ULTRASOUND OF UPPER GASTROINTESTINAL TRACT N/A 2019    Procedure: ULTRASOUND, UPPER GI TRACT, ENDOSCOPIC;  Surgeon: Jose Hess MD;  Location: [facility] (2ND FLR);  Service: Endoscopy;  Laterality: N/A;    ENDOSCOPIC ULTRASOUND OF UPPER GASTROINTESTINAL TRACT N/A 10/25/2023    Procedure: ULTRASOUND, UPPER GI TRACT, ENDOSCOPIC;  Surgeon: Jose Campos MD;  Location: [facility];  Service: Endoscopy;  Laterality: N/A;  10/20/23: instructions sent via portal-GD    ESOPHAGOGASTRODUODENOSCOPY N/A 2018    Procedure: EGD (ESOPHAGOGASTRODUODENOSCOPY);  Surgeon: Angelo Reynolds MD;  Location: [facility] (2ND FLR);  Service: Endoscopy;  Laterality: N/A;    ESOPHAGOGASTRODUODENOSCOPY N/A 2019    Procedure: EGD (ESOPHAGOGASTRODUODENOSCOPY);  Surgeon: Ramiro Jefferson MD;  Location: [facility] (4TH FLR);  Service: Endoscopy;   Laterality: N/A;    ESOPHAGOGASTRODUODENOSCOPY N/A 05/04/2022    Procedure: EGD (ESOPHAGOGASTRODUODENOSCOPY);  Surgeon: Ramiro Jefferson MD;  Location: [facility] (2ND FLR);  Service: Endoscopy;  Laterality: N/A;  fully vaccinated-GT    ESOPHAGOGASTRODUODENOSCOPY N/A 10/25/2023    Procedure: EGD (ESOPHAGOGASTRODUODENOSCOPY);  Surgeon: Jose Campos MD;  Location: [facility] ENDO;  Service: Endoscopy;  Laterality: N/A;    ESOPHAGOGASTRODUODENOSCOPY N/A 11/15/2023    Procedure: ESOPHAGOGASTRODUODENOSCOPY (EGD);  Surgeon: Db Sanchez MD;  Location: [facility] (4TH FLR);  Service: Endoscopy;  Laterality: N/A;  New diagnosis of esophageal varices. Not a candidate for beta blocker due to soft blood pressures. Recommend EV banding.  PT/INR done on 11/3/23 and CBC done on 9/30/23  ok per Dr. Espinoza to be done by 1st available provider-see tele encounter-st  1    EYE SURGERY      Cataract Removal    FLUOROSCOPIC URODYNAMIC STUDY N/A 11/09/2021    Procedure: URODYNAMIC STUDY, FLUOROSCOPIC;  Surgeon: Viridiana Valenzuela MD;  [facility];  Service: Urology;  Laterality: N/A;  90 minutes     HYSTERECTOMY      JOINT REPLACEMENT      OOPHORECTOMY  1970    posterolateral fusion with autograft bone and Eun mineralized bone matrix  02/01/2013    at [facility] for lumbar spine stenosis    SMALL INTESTINE SURGERY      SPINE SURGERY      TOE SURGERY      TONSILLECTOMY      TRANSFORAMINAL EPIDURAL INJECTION OF STEROID Bilateral 12/21/2022    Procedure: Injection,steroid,epidural, Todd L5/S1 TF CONTRAST DIRECT REFERRAL;  Surgeon: Toni Osorio MD;  Location: [facility] PAIN MGT;  Service: Pain Management;  Laterality: Bilateral;    TRIGGER FINGER RELEASE       Family History       Problem Relation (Age of Onset)    Alcohol abuse Brother, Brother, Brother    Arthritis Daughter, Brother, Brother    Asthma Daughter, Daughter    Bladder Cancer Mother    Breast cancer Sister (79)    Cataracts Mother    Colon cancer Father, Brother (70)     Depression Daughter, Daughter, Daughter    Glaucoma Mother    Heart disease Father (50), Mother    Hyperlipidemia Mother    Hypertension Daughter    Kidney disease Mother    Parkinsonism Brother    Stroke Daughter (40), Daughter          Tobacco Use    Smoking status: Never    Smokeless tobacco: Never   Substance and Sexual Activity    Alcohol use: Not Currently     Comment: socially, rarely    Drug use: Never    Sexual activity: Not Currently     Review of Systems   Constitutional:  Positive for activity change and appetite change. Negative for fatigue and fever.   HENT: Negative.     Respiratory:  Negative for cough and shortness of breath.    Cardiovascular:  Negative for chest pain.   Gastrointestinal:  Positive for abdominal pain and nausea. Negative for abdominal distention, constipation, diarrhea and vomiting.   Musculoskeletal: Negative.    Skin: Negative.      Objective:     Vital Signs (Most Recent):  Temp: 98.1 °F (36.7 °C) (03/28/25 1617)  Pulse: 90 (03/28/25 2020)  Resp: 18 (03/28/25 1953)  BP: (!) 124/90 (03/28/25 1617)  SpO2: 97 % (03/28/25 1617) Vital Signs (24h Range):  Temp:  [98.1 °F (36.7 °C)] 98.1 °F (36.7 °C)  Pulse:  [90] 90  Resp:  [18-20] 18  SpO2:  [97 %] 97 %  BP: (124)/(90) 124/90     Weight: 58.1 kg (128 lb)  Body mass index is 23.41 kg/m².     Physical Exam  Vitals and nursing note reviewed.   Constitutional:       General: She is not in acute distress.     Appearance: Normal appearance.   HENT:      Nose: Nose normal.   Cardiovascular:      Rate and Rhythm: Normal rate and regular rhythm.   Pulmonary:      Effort: Pulmonary effort is normal. No respiratory distress.   Abdominal:      Comments: Well healed surgical scar  Soft, non-tender, non-distended   Musculoskeletal:         General: Normal range of motion.   Skin:     General: Skin is warm and dry.   Neurological:      General: No focal deficit present.      Mental Status: She is alert.            I have reviewed all pertinent  lab results within the past 24 hours.  CBC:   Recent Labs   Lab 03/28/25  1715   WBC 5.52   RBC 3.36*   HGB 11.5*   HCT 35.5*   PLT 93*   *   MCH 34.2   MCHC 32.4     CMP:   Recent Labs   Lab 03/28/25  1715   CALCIUM 10.2   ALBUMIN 4.0      K 4.5   CO2 20*      BUN 11   CREATININE 1.0   ALKPHOS 74   ALT 21   AST 27   BILITOT 0.9       Significant Diagnostics:  I have reviewed all pertinent imaging results/findings within the past 24 hours.

## 2025-03-29 NOTE — ED TRIAGE NOTES
Anayeli Judd, a 80 y.o. female presents to the ED w/ complaint of lower abdominal pain.     Triage note:  Chief Complaint   Patient presents with    Abdominal Pain     Lower abd pain,      Review of patient's allergies indicates:   Allergen Reactions    Codeine Itching and Nausea And Vomiting    Dilaudid [hydromorphone (bulk)] Other (See Comments)     Oversedating, head burning. Pt prefers to avoid.       Percocet [oxycodone-acetaminophen] Itching    Sulfa (sulfonamide antibiotics) Itching and Nausea And Vomiting    Dilaudid [hydromorphone]     Latex, natural rubber Rash    Opioids - morphine analogues Rash     Past Medical History:   Diagnosis Date    Abdominal pain     Allergic rhinitis     Arthritis     Blood platelet disorder     Blood platelet disorder     Blood transfusion     during delivery and     Bowel obstruction     Cervical radiculopathy     followed by dr cloud    Cirrhosis of liver     Colon cancer     transverse colon; resected; Stage IIA (pT3 pN0 MX)    Degenerative joint disease (DJD) of lumbar spine     Diabetes mellitus     Diarrhea     Falls 2020    Family history of breast cancer     Family history of colon cancer     Fatty liver     GERD (gastroesophageal reflux disease)     History of shingles     Hyperlipidemia     Hypomagnesemia     Hypothyroidism     Irritable bowel syndrome     Microscopic colitis     treated     Migraine     Myelodysplastic syndrome     Raynaud phenomenon     Raynaud's disease     Squamous cell carcinoma of skin     Type 2 diabetes mellitus     Usual hyperplasia of lactiferous duct

## 2025-03-29 NOTE — CONSULTS
[facility]  General Surgery  Consult Note    Patient Name: Anayeli Judd  MRN: [id number]  Code Status: Full Code  Admission Date: 3/28/2025  Hospital Length of Stay: 0 days  Attending Physician: Angie Trevino MD  Primary Care Provider: Darci Guthrie MD    Patient information was obtained from patient and past medical records.     Inpatient consult to General surgery  Consult performed by: Primo Mendes MD  Consult ordered by: Duglas Joshi PA-C        Subjective:     Principal Problem: <principal problem not specified>    History of Present Illness: Anayeli Judd is an 80yoF with a past medical history significant for diabetes, reflux and an extensive intra-abdominal surgical history and a history of recurrent partial small bowel obstructions. She presents to the emergency room with complaints of nausea, abdominal pain, and PO aversion. She states that she has periumbilical abdominal discomfort. She denies any episodes of emesis. Her last bowel movement was yesterday. She endorses flatus today. She is hemodynamically stable in the emergency department. Her labs are largely unremarkable. Her CT scan demonstrates dilated stomach and proximal small intestine. There is no transition point appreciated. General surgery consulted for evaluation.     At the time of my exam, the patient is resting comfortably in bed. Her abdominal exam is benign. I discussed her imaging findings and the plan of care.     No current facility-administered medications on file prior to encounter.     Current Outpatient Medications on File Prior to Encounter   Medication Sig    acetaminophen (TYLENOL) 650 MG TbSR Take 1,300 mg by mouth 2 (two) times a day.    ascorbic acid, vitamin C, (VITAMIN C) 1000 MG tablet Take 1,000 mg by mouth once daily.    azelastine (ASTELIN) 137 mcg (0.1 %) nasal spray 1 spray (137 mcg total) by Nasal route 2 (two) times daily.    biotin 10,000 mcg TbDL Take 1 tablet by mouth once  daily.     blood sugar diagnostic (FREESTYLE LITE STRIPS) Strp 1 strip by Misc.(Non-Drug; Combo Route) route 3 (three) times daily.    blood-glucose meter kit Use as instructed    calcium-vitamin D3 (OS-KASSANDRA 500 + D3) 500 mg-5 mcg (200 unit) per tablet Take 1 tablet by mouth once daily.    cholestyramine (QUESTRAN) 4 gram packet Take 1 packet (4 g total) by mouth 3 (three) times daily with meals.    cranberry extract 200 mg Cap Take 1 capsule by mouth once daily.    diphenoxylate-atropine 2.5-0.025 mg (LOMOTIL) 2.5-0.025 mg per tablet Take 1 tablet by mouth 3 (three) times daily as needed for Diarrhea.    donepeziL (ARICEPT) 10 MG tablet TAKE 1 TABLET(10 MG) BY MOUTH EVERY EVENING    DULoxetine (CYMBALTA) 30 MG capsule Take 1 capsule (30 mg total) by mouth 2 (two) times daily.    ferrous sulfate 324 mg (65 mg iron) TbEC Take 1 tablet (324 mg total) by mouth once daily.    flash glucose scanning reader (FREESTYLE EFREN 14 DAY READER) Misc Check blood glucose level 3 times daily.    flash glucose sensor (FREESTYLE EFREN 14 DAY SENSOR) Kit APPLY 1 SENSOR EVERY 14 DAYS    gabapentin (NEURONTIN) 300 MG capsule Take 1 to 2 capsules 3 times daily if needed for leg pain.    HYDROcodone-acetaminophen (NORCO) 5-325 mg per tablet Take 1 tablet by mouth every 6 (six) hours as needed for Pain.    hydrocortisone 2.5 % cream Apply topically 2 (two) times daily as needed.    lactulose (CHRONULAC) 20 gram/30 mL Soln Take 30 mLs (20 g total) by mouth 3 (three) times daily as needed (Constipation).    lancets (FREESTYLE LANCETS) 28 gauge Misc 30 lancets by Misc.(Non-Drug; Combo Route) route Daily.    levothyroxine (SYNTHROID) 75 MCG tablet TAKE 1 TABLET(75 MCG) BY MOUTH BEFORE BREAKFAST    levothyroxine (SYNTHROID) 75 MCG tablet TAKE 1 TABLET BY MOUTH BEFORE  BREAKFAST    LIDOcaine (LIDODERM) 5 % APPLY 1 TO 3 PATCHES TO BACK EVERY DAY. REMOVE WITHIN 12 HOURS    linaGLIPtin (TRADJENTA) 5 mg Tab tablet TAKE 1 TABLET(5 MG) BY MOUTH EVERY  DAY    loperamide (IMODIUM) 2 mg capsule Take 1 capsule (2 mg total) by mouth 2 (two) times daily as needed for Diarrhea.    loperamide (IMODIUM) 2 mg capsule Take 1 capsule (2 mg total) by mouth 4 (four) times daily as needed for Diarrhea.    magnesium 30 mg Tab Take 30 mg by mouth once. Patient does not know actual dose in MG    magnesium citrate solution Take 300 mLs by mouth.    metFORMIN (GLUCOPHAGE) 1000 MG tablet TAKE 1 TABLET(1000 MG) BY MOUTH TWICE DAILY WITH MEALS    mometasone (ELOCON) 0.1 % ointment Apply topically 2 (two) times a day. Mix 50/50 with mupirocin ointment. Apply in each nostril twice daily.    montelukast (SINGULAIR) 10 mg tablet Take 1 tablet (10 mg total) by mouth once daily.    ondansetron (ZOFRAN-ODT) 8 MG TbDL Take 1 tablet (8 mg total) by mouth every 8 (eight) hours as needed (nausea).    ospemifene (OSPHENA) 60 mg Tab Take by mouth.    pantoprazole (PROTONIX) 20 MG tablet TAKE 1 TABLET(20 MG) BY MOUTH TWICE DAILY    polyethylene glycol (GLYCOLAX) 17 gram PwPk Take 17 g by mouth once daily.    pregabalin (LYRICA) 100 MG capsule Take 100 mg by mouth.    PREMARIN vaginal cream INSERT 0.5 GRAM VAGINALLY 2 TIMES A WEEK    rifAXIMin (XIFAXAN) 550 mg Tab Take 1 tablet (550 mg total) by mouth 2 (two) times daily.    traZODone (DESYREL) 50 MG tablet TAKE 1 TABLET(50 MG) BY MOUTH EVERY EVENING    triamcinolone acetonide 0.1% (KENALOG) 0.1 % paste 3 (three) times daily.    ubrogepant (UBRELVY) 100 mg tablet Take 1 tablet daily as needed for migraine headache. May take 1 additional tablet 2 hours later if needed.    valACYclovir (VALTREX) 1000 MG tablet Take 1 tablet (1,000 mg total) by mouth once daily.    [DISCONTINUED] hyoscyamine (ANASPAZ,LEVSIN) 0.125 mg Tab TAKE 1 TABLET(125 MCG) BY MOUTH EVERY 8 HOURS AS NEEDED FOR URGENCY       Review of patient's allergies indicates:   Allergen Reactions    Codeine Itching and Nausea And Vomiting    Dilaudid [hydromorphone (bulk)] Other (See Comments)      Oversedating, head burning. Pt prefers to avoid.       Percocet [oxycodone-acetaminophen] Itching    Sulfa (sulfonamide antibiotics) Itching and Nausea And Vomiting    Dilaudid [hydromorphone]     Latex, natural rubber Rash    Opioids - morphine analogues Rash       Past Medical History:   Diagnosis Date    Abdominal pain     Allergic rhinitis     Arthritis     Blood platelet disorder     Blood platelet disorder     Blood transfusion     during delivery and     Bowel obstruction     Cervical radiculopathy     followed by dr cloud    Cirrhosis of liver     Colon cancer     transverse colon; resected; Stage IIA (pT3 pN0 MX)    Degenerative joint disease (DJD) of lumbar spine     Diabetes mellitus     Diarrhea     Falls 2020    Family history of breast cancer     Family history of colon cancer     Fatty liver     GERD (gastroesophageal reflux disease)     History of shingles     Hyperlipidemia     Hypomagnesemia     Hypothyroidism     Irritable bowel syndrome     Microscopic colitis     treated     Migraine     Myelodysplastic syndrome     Raynaud phenomenon     Raynaud's disease     Squamous cell carcinoma of skin     Type 2 diabetes mellitus     Usual hyperplasia of lactiferous duct      Past Surgical History:   Procedure Laterality Date    ADENOIDECTOMY      APPENDECTOMY      BACK SURGERY      BREAST BIOPSY  1970    BREAST CYST EXCISION  1970?    BREAST LUMPECTOMY  1970    CARPAL TUNNEL RELEASE      bilateral      SECTION      CHOLECYSTECTOMY  1965    COLECTOMY  2011    Transverse colon resection by Dr. Aguirre    COLONOSCOPY N/A 2017    Procedure: COLONOSCOPY;  Surgeon: Manjit Alvarez MD;  Location: [address]);  Service: Endoscopy;  Laterality: N/A;    COLONOSCOPY N/A 2019    Procedure: COLONOSCOPY;  Surgeon: Ramiro Jefferson MD;  Location: [address]);  Service: Endoscopy;  Laterality: N/A;  PM prep    COLONOSCOPY N/A 2022    Procedure:  COLONOSCOPY;  Surgeon: Ramiro Jefferson MD;  Location: [facility] (2ND FLR);  Service: Endoscopy;  Laterality: N/A;  EGD and colonoscopy in 6-8 weeks from now     states has constipation. -extended Golytely prep    CYSTOSCOPY N/A 11/09/2021    Procedure: CYSTOSCOPY;  Surgeon: Viridiana Valenzuela MD;  Location: [facility] OR 1ST FLR;  Service: Urology;  Laterality: N/A;    ENDOSCOPIC ULTRASOUND OF UPPER GASTROINTESTINAL TRACT N/A 12/12/2018    Procedure: ULTRASOUND, UPPER GI TRACT, ENDOSCOPIC;  Surgeon: Jose Hess MD;  Location: [facility];  Service: Endoscopy;  Laterality: N/A;    ENDOSCOPIC ULTRASOUND OF UPPER GASTROINTESTINAL TRACT N/A 01/23/2019    Procedure: ULTRASOUND, UPPER GI TRACT, ENDOSCOPIC;  Surgeon: Jose Hess MD;  Location: [facility] (2ND FLR);  Service: Endoscopy;  Laterality: N/A;    ENDOSCOPIC ULTRASOUND OF UPPER GASTROINTESTINAL TRACT N/A 10/25/2023    Procedure: ULTRASOUND, UPPER GI TRACT, ENDOSCOPIC;  Surgeon: Jose Campos MD;  Location: [facility];  Service: Endoscopy;  Laterality: N/A;  10/20/23: instructions sent via portal-GD    ESOPHAGOGASTRODUODENOSCOPY N/A 11/16/2018    Procedure: EGD (ESOPHAGOGASTRODUODENOSCOPY);  Surgeon: Angelo Reynolds MD;  Location: [facility] (2ND FLR);  Service: Endoscopy;  Laterality: N/A;    ESOPHAGOGASTRODUODENOSCOPY N/A 06/26/2019    Procedure: EGD (ESOPHAGOGASTRODUODENOSCOPY);  Surgeon: Ramiro Jefferson MD;  Location: [facility] (4TH FLR);  Service: Endoscopy;  Laterality: N/A;    ESOPHAGOGASTRODUODENOSCOPY N/A 05/04/2022    Procedure: EGD (ESOPHAGOGASTRODUODENOSCOPY);  Surgeon: Ramiro Jefferson MD;  Location: [facility] (2ND FLR);  Service: Endoscopy;  Laterality: N/A;  fully vaccinated-GT    ESOPHAGOGASTRODUODENOSCOPY N/A 10/25/2023    Procedure: EGD (ESOPHAGOGASTRODUODENOSCOPY);  Surgeon: Jose Campos MD;  Location: [facility];  Service: Endoscopy;  Laterality: N/A;    ESOPHAGOGASTRODUODENOSCOPY N/A 11/15/2023    Procedure:  ESOPHAGOGASTRODUODENOSCOPY (EGD);  Surgeon: Db Sanchez MD;  Location: [facility] ENDO (4TH FLR);  Service: Endoscopy;  Laterality: N/A;  New diagnosis of esophageal varices. Not a candidate for beta blocker due to soft blood pressures. Recommend EV banding.  PT/INR done on 11/3/23 and CBC done on 9/30/23  ok per Dr. Espinoza to be done by 1st available provider-see tele encounter-st  1    EYE SURGERY      Cataract Removal    FLUOROSCOPIC URODYNAMIC STUDY N/A 11/09/2021    Procedure: URODYNAMIC STUDY, FLUOROSCOPIC;  Surgeon: Viridiana Valenzuela MD;  Location: [facility] OR 1ST FLR;  Service: Urology;  Laterality: N/A;  90 minutes     HYSTERECTOMY      JOINT REPLACEMENT      OOPHORECTOMY  1970    posterolateral fusion with autograft bone and Eun mineralized bone matrix  02/01/2013    at [facility] for lumbar spine stenosis    SMALL INTESTINE SURGERY      SPINE SURGERY      TOE SURGERY      TONSILLECTOMY      TRANSFORAMINAL EPIDURAL INJECTION OF STEROID Bilateral 12/21/2022    Procedure: Injection,steroid,epidural, Todd L5/S1 TF CONTRAST DIRECT REFERRAL;  Surgeon: Toni Osorio MD;  Location: [facility] PAIN MGT;  Service: Pain Management;  Laterality: Bilateral;    TRIGGER FINGER RELEASE       Family History       Problem Relation (Age of Onset)    Alcohol abuse Brother, Brother, Brother    Arthritis Daughter, Brother, Brother    Asthma Daughter, Daughter    Bladder Cancer Mother    Breast cancer Sister (79)    Cataracts Mother    Colon cancer Father, Brother (70)    Depression Daughter, Daughter, Daughter    Glaucoma Mother    Heart disease Father (50), Mother    Hyperlipidemia Mother    Hypertension Daughter    Kidney disease Mother    Parkinsonism Brother    Stroke Daughter (40), Daughter          Tobacco Use    Smoking status: Never    Smokeless tobacco: Never   Substance and Sexual Activity    Alcohol use: Not Currently     Comment: socially, rarely    Drug use: Never    Sexual activity: Not Currently     Review of Systems    Constitutional:  Positive for activity change and appetite change. Negative for fatigue and fever.   HENT: Negative.     Respiratory:  Negative for cough and shortness of breath.    Cardiovascular:  Negative for chest pain.   Gastrointestinal:  Positive for abdominal pain and nausea. Negative for abdominal distention, constipation, diarrhea and vomiting.   Musculoskeletal: Negative.    Skin: Negative.      Objective:     Vital Signs (Most Recent):  Temp: 98.1 °F (36.7 °C) (03/28/25 1617)  Pulse: 90 (03/28/25 2020)  Resp: 18 (03/28/25 1953)  BP: (!) 124/90 (03/28/25 1617)  SpO2: 97 % (03/28/25 1617) Vital Signs (24h Range):  Temp:  [98.1 °F (36.7 °C)] 98.1 °F (36.7 °C)  Pulse:  [90] 90  Resp:  [18-20] 18  SpO2:  [97 %] 97 %  BP: (124)/(90) 124/90     Weight: 58.1 kg (128 lb)  Body mass index is 23.41 kg/m².     Physical Exam  Vitals and nursing note reviewed.   Constitutional:       General: She is not in acute distress.     Appearance: Normal appearance.   HENT:      Nose: Nose normal.   Cardiovascular:      Rate and Rhythm: Normal rate and regular rhythm.   Pulmonary:      Effort: Pulmonary effort is normal. No respiratory distress.   Abdominal:      Comments: Well healed surgical scar  Soft, non-tender, non-distended   Musculoskeletal:         General: Normal range of motion.   Skin:     General: Skin is warm and dry.   Neurological:      General: No focal deficit present.      Mental Status: She is alert.            I have reviewed all pertinent lab results within the past 24 hours.  CBC:   Recent Labs   Lab 03/28/25  1715   WBC 5.52   RBC 3.36*   HGB 11.5*   HCT 35.5*   PLT 93*   *   MCH 34.2   MCHC 32.4     CMP:   Recent Labs   Lab 03/28/25  1715   CALCIUM 10.2   ALBUMIN 4.0      K 4.5   CO2 20*      BUN 11   CREATININE 1.0   ALKPHOS 74   ALT 21   AST 27   BILITOT 0.9       Significant Diagnostics:  I have reviewed all pertinent imaging results/findings within the past 24  hours.    Assessment/Plan:     Generalized abdominal pain  Anayeli uJdd is an 80yoF with a significant past intra-abdominal surgical history who presents to the ED with complaints of abdominal pain, nausea, and PO aversion. General surgery consulted for evaluation.     - patient seen and examined   - labs and imaging reviewed   - when compared to her CT scan from February of this year, there slightly more dilation of the small intestine in the central abdomen; I do not appreciate a transition point  - recommend NG tube insertion with decompression  - NPO, mIVFs  - the patient adamantly voices that she would not agree to surgery currently. When posed if it was an operation in order to save her life, she states that 'she would have to think about it.'  - rest of care per medicine team  - please call with questions      VTE Risk Mitigation (From admission, onward)           Ordered     enoxaparin injection 40 mg  Every 24 hours         03/28/25 2109     IP VTE HIGH RISK PATIENT  Once         03/28/25 2109     Place sequential compression device  Until discontinued         03/28/25 2109                    Thank you for your consult. I will follow-up with patient. Please contact us if you have any additional questions.    Primo Mendes MD  General Surgery  [facility]

## 2025-03-29 NOTE — ASSESSMENT & PLAN NOTE
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  Associated symptoms include nausea and PO aversion, no vomiting.     CTAP with contrast showed multiple dilated proximal to mid small bowel loops noting relative decompression of the distal small bowel, no convincing transition point, and fluid filled/dilated stomach.    -general surgery consulted, appreciate recs  -maintain NG tube to LIWS for bowel rest/decompression  -remain NPO for now, held oral meds  -maintenance IV fluids   -PRN antiemetics and pain control

## 2025-03-29 NOTE — HPI
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 Patient notes chronic issues of abdominal pain related to her extensive surgical history with current symptom onset being the morning PTA after eating breakfast. The pain initially began in the lower abdominal quadrants, gradually radiated to involve the entire abdomen, and worsened with oral intake of food/water. Associated symptoms include nausea. LBM was also noted to be the morning PTA. Denies fever/chills, vomiting, chest pain, shortness of breath, difficulty urinating, dysuria. Pain currently rated 3/10 after receiving pain medications, previously rated 10/10 upon symptom onset.     Patient remained afebrile and hemodynamically stable while in the ED. Labs largely unremarkable other than stable macrocytic anemia, no leukocytosis or acute electrolyte abnormalities noted. CTAP w contrast showed moderate to large amount of colonic stool burden, surgical changes related to partial SMR with patent anastomoses, multiple dilated proximal to mid small bowel loops noting relative decompression of the distal small bowel, and no convincing transition point or pneumatosis. Given morphine 4 mg IV x2, zofran 4 mg IV with symptomatic relief, as well as, 1L IV fluid bolus and IV benadryl 12.5 mg for itching. Admitted to hospital medicine for further management.

## 2025-03-29 NOTE — SUBJECTIVE & OBJECTIVE
Interval History: NAEON.  Patient doing well this morning.  NG tube flushed on rounds this morning.  Reports having some flatus.  No current nausea or vomiting.    Medications:  Continuous Infusions:   lactated ringers   Intravenous Continuous 75 mL/hr at 03/29/25 0116 New Bag at 03/29/25 0116     Scheduled Meds:   enoxparin  40 mg Subcutaneous Q24H (prophylaxis, 1700)    [START ON 3/30/2025] levothyroxine  50 mcg Intravenous Before breakfast    LIDOcaine  2 patch Transdermal Daily     PRN Meds:  Current Facility-Administered Medications:     dextrose 50%, 12.5 g, Intravenous, PRN    dextrose 50%, 25 g, Intravenous, PRN    gabapentin, 300 mg, Oral, TID PRN    glucagon (human recombinant), 1 mg, Intramuscular, PRN    glucose, 16 g, Oral, PRN    glucose, 24 g, Oral, PRN    insulin aspart U-100, 0-5 Units, Subcutaneous, Q6H PRN    naloxone, 0.02 mg, Intravenous, PRN    ondansetron, 4 mg, Intravenous, Q6H PRN    sodium chloride 0.9%, 10 mL, Intravenous, Q12H PRN     Review of patient's allergies indicates:   Allergen Reactions    Codeine Itching and Nausea And Vomiting    Dilaudid [hydromorphone (bulk)] Other (See Comments)     Oversedating, head burning. Pt prefers to avoid.       Percocet [oxycodone-acetaminophen] Itching    Sulfa (sulfonamide antibiotics) Itching and Nausea And Vomiting    Dilaudid [hydromorphone]     Latex, natural rubber Rash    Opioids - morphine analogues Rash     Objective:     Vital Signs (Most Recent):  Temp: 98.1 °F (36.7 °C) (03/29/25 0747)  Pulse: 80 (03/29/25 0747)  Resp: 18 (03/29/25 0747)  BP: 108/63 (03/29/25 0747)  SpO2: 95 % (03/29/25 0747) Vital Signs (24h Range):  Temp:  [97.7 °F (36.5 °C)-98.3 °F (36.8 °C)] 98.1 °F (36.7 °C)  Pulse:  [65-90] 80  Resp:  [12-20] 18  SpO2:  [93 %-97 %] 95 %  BP: (102-147)/(56-90) 108/63     Weight: 58.1 kg (128 lb)  Body mass index is 23.41 kg/m².    Intake/Output - Last 3 Shifts         03/27 0700 03/28 0659 03/28 0700 03/29 0659 03/29 0700 03/30  0659    IV Piggyback  1000     Total Intake(mL/kg)  1000 (17.2)     Drains  300     Total Output  300     Net  +700                     Physical Exam  Vitals and nursing note reviewed.   Constitutional:       General: She is not in acute distress.     Appearance: Normal appearance.   HENT:      Nose: Nose normal.   Cardiovascular:      Rate and Rhythm: Normal rate and regular rhythm.   Pulmonary:      Effort: Pulmonary effort is normal. No respiratory distress.   Abdominal:      Comments: Well healed surgical scar  Soft, non-tender, non-distended   Musculoskeletal:         General: Normal range of motion.   Skin:     General: Skin is warm and dry.   Neurological:      General: No focal deficit present.      Mental Status: She is alert.          Significant Labs:  I have reviewed all pertinent lab results within the past 24 hours.  CBC:   Recent Labs   Lab 03/29/25  0537   WBC 6.67   RBC 3.23*   HGB 11.2*   HCT 34.8*   PLT 80*   *   MCH 34.7   MCHC 32.2     CMP:   Recent Labs   Lab 03/29/25  0537   CALCIUM 9.7   ALBUMIN 3.4*   *   K 4.5   CO2 20*      BUN 10   CREATININE 1.1   ALKPHOS 94   ALT 75*   *   BILITOT 1.5*       Significant Diagnostics:  I have reviewed all pertinent imaging results/findings within the past 24 hours.

## 2025-03-29 NOTE — ASSESSMENT & PLAN NOTE
"Patient's FSGs are controlled on current medication regimen.  Last A1c reviewed-   Lab Results   Component Value Date    HGBA1C 7.0 (H) 01/25/2024     Most recent fingerstick glucose reviewed- No results for input(s): "POCTGLUCOSE" in the last 24 hours.  Current correctional scale  Low  Maintain anti-hyperglycemic dose as follows-   Antihyperglycemics (From admission, onward)      Start     Stop Route Frequency Ordered    03/29/25 0256  insulin aspart U-100 pen 0-5 Units         -- SubQ Every 6 hours PRN 03/29/25 0156          -hold oral hypoglycemics while patient is in the hospital.  -repeat hemoglobin A1c  -accuchecks Q6H while NPO    "

## 2025-03-29 NOTE — SUBJECTIVE & OBJECTIVE
Past Medical History:   Diagnosis Date    Abdominal pain     Allergic rhinitis     Arthritis     Blood platelet disorder     Blood platelet disorder     Blood transfusion     during delivery and     Bowel obstruction     Cervical radiculopathy     followed by dr cloud    Cirrhosis of liver     Colon cancer     transverse colon; resected; Stage IIA (pT3 pN0 MX)    Degenerative joint disease (DJD) of lumbar spine     Diabetes mellitus     Diarrhea     Falls 2020    Family history of breast cancer     Family history of colon cancer     Fatty liver     GERD (gastroesophageal reflux disease)     History of shingles     Hyperlipidemia     Hypomagnesemia     Hypothyroidism     Irritable bowel syndrome     Microscopic colitis     treated     Migraine     Myelodysplastic syndrome     Raynaud phenomenon     Raynaud's disease     Squamous cell carcinoma of skin     Type 2 diabetes mellitus     Usual hyperplasia of lactiferous duct        Past Surgical History:   Procedure Laterality Date    ADENOIDECTOMY      APPENDECTOMY      BACK SURGERY      BREAST BIOPSY  1970    BREAST CYST EXCISION  ?    BREAST LUMPECTOMY  1970    CARPAL TUNNEL RELEASE      bilateral      SECTION      CHOLECYSTECTOMY  1965    COLECTOMY  2011    Transverse colon resection by Dr. Aguirre    COLONOSCOPY N/A 2017    Procedure: COLONOSCOPY;  Surgeon: Manjit Alvarez MD;  Location: [facility] (4TH FLR);  Service: Endoscopy;  Laterality: N/A;    COLONOSCOPY N/A 2019    Procedure: COLONOSCOPY;  Surgeon: Ramiro Jefferson MD;  Location: [facility] (4TH FLR);  Service: Endoscopy;  Laterality: N/A;  PM prep    COLONOSCOPY N/A 2022    Procedure: COLONOSCOPY;  Surgeon: Ramiro Jefferson MD;  Location: [facility] (2ND FLR);  Service: Endoscopy;  Laterality: N/A;  EGD and colonoscopy in 6-8 weeks from now     states has constipation. -extended Golytely prep    CYSTOSCOPY N/A 2021    Procedure: CYSTOSCOPY;   Surgeon: Viridiana Valenzuela MD;  Location: [facility] 1ST FLR;  Service: Urology;  Laterality: N/A;    ENDOSCOPIC ULTRASOUND OF UPPER GASTROINTESTINAL TRACT N/A 12/12/2018    Procedure: ULTRASOUND, UPPER GI TRACT, ENDOSCOPIC;  Surgeon: Jose Hess MD;  Location: [facility];  Service: Endoscopy;  Laterality: N/A;    ENDOSCOPIC ULTRASOUND OF UPPER GASTROINTESTINAL TRACT N/A 01/23/2019    Procedure: ULTRASOUND, UPPER GI TRACT, ENDOSCOPIC;  Surgeon: Jose Hess MD;  Location: [facility] (2ND FLR);  Service: Endoscopy;  Laterality: N/A;    ENDOSCOPIC ULTRASOUND OF UPPER GASTROINTESTINAL TRACT N/A 10/25/2023    Procedure: ULTRASOUND, UPPER GI TRACT, ENDOSCOPIC;  Surgeon: Jose Campos MD;  Location: [facility];  Service: Endoscopy;  Laterality: N/A;  10/20/23: instructions sent via portal-GD    ESOPHAGOGASTRODUODENOSCOPY N/A 11/16/2018    Procedure: EGD (ESOPHAGOGASTRODUODENOSCOPY);  Surgeon: Angelo Reynolds MD;  Location: [facility] (2ND FLR);  Service: Endoscopy;  Laterality: N/A;    ESOPHAGOGASTRODUODENOSCOPY N/A 06/26/2019    Procedure: EGD (ESOPHAGOGASTRODUODENOSCOPY);  Surgeon: Ramiro Jefferson MD;  Location: [facility] (4TH FLR);  Service: Endoscopy;  Laterality: N/A;    ESOPHAGOGASTRODUODENOSCOPY N/A 05/04/2022    Procedure: EGD (ESOPHAGOGASTRODUODENOSCOPY);  Surgeon: Ramiro Jefferson MD;  Location: [facility] (2ND FLR);  Service: Endoscopy;  Laterality: N/A;  fully vaccinated-GT    ESOPHAGOGASTRODUODENOSCOPY N/A 10/25/2023    Procedure: EGD (ESOPHAGOGASTRODUODENOSCOPY);  Surgeon: Jose Campos MD;  Location: [facility];  Service: Endoscopy;  Laterality: N/A;    ESOPHAGOGASTRODUODENOSCOPY N/A 11/15/2023    Procedure: ESOPHAGOGASTRODUODENOSCOPY (EGD);  Surgeon: Db Sanchez MD;  Location: [facility] (4TH FLR);  Service: Endoscopy;  Laterality: N/A;  New diagnosis of esophageal varices. Not a candidate for beta blocker due to soft blood pressures. Recommend EV banding.  PT/INR done on 11/3/23 and  CBC done on 9/30/23  ok per Dr. Espinoza to be done by 1st available provider-see tele encounter-st  1    EYE SURGERY      Cataract Removal    FLUOROSCOPIC URODYNAMIC STUDY N/A 11/09/2021    Procedure: URODYNAMIC STUDY, FLUOROSCOPIC;  Surgeon: Viridiana Valenzuela MD;  Location: [facility] OR [address] [place] [address];  Service: Urology;  Laterality: N/A;  90 minutes     HYSTERECTOMY      JOINT REPLACEMENT      OOPHORECTOMY  1970    posterolateral fusion with autograft bone and [place] mineralized bone matrix  02/01/2013    at [facility] for lumbar spine stenosis    SMALL INTESTINE SURGERY      SPINE SURGERY      TOE SURGERY      TONSILLECTOMY      TRANSFORAMINAL EPIDURAL INJECTION OF STEROID Bilateral 12/21/2022    Procedure: Injection,steroid,epidural, Todd L5/S1 TF CONTRAST DIRECT REFERRAL;  Surgeon: Toni Osorio MD;  [facility];  Service: Pain Management;  Laterality: Bilateral;    TRIGGER FINGER RELEASE         Review of patient's allergies indicates:   Allergen Reactions    Codeine Itching and Nausea And Vomiting    Dilaudid [hydromorphone (bulk)] Other (See Comments)     Oversedating, head burning. Pt prefers to avoid.       Percocet [oxycodone-acetaminophen] Itching    Sulfa (sulfonamide antibiotics) Itching and Nausea And Vomiting    Dilaudid [hydromorphone]     Latex, natural rubber Rash    Opioids - morphine analogues Rash       No current facility-administered medications on file prior to encounter.     Current Outpatient Medications on File Prior to Encounter   Medication Sig    acetaminophen (TYLENOL) 650 MG TbSR Take 1,300 mg by mouth 2 (two) times a day.    ascorbic acid, vitamin C, (VITAMIN C) 1000 MG tablet Take 1,000 mg by mouth once daily.    azelastine (ASTELIN) 137 mcg (0.1 %) nasal spray 1 spray (137 mcg total) by Nasal route 2 (two) times daily.    biotin 10,000 mcg TbDL Take 1 tablet by mouth once daily.     blood sugar diagnostic (FREESTYLE LITE STRIPS) Strp 1 strip by Misc.(Non-Drug; Combo Route) route 3 (three)  times daily.    blood-glucose meter kit Use as instructed    calcium-vitamin D3 (OS-KASSANDRA 500 + D3) 500 mg-5 mcg (200 unit) per tablet Take 1 tablet by mouth once daily.    cholestyramine (QUESTRAN) 4 gram packet Take 1 packet (4 g total) by mouth 3 (three) times daily with meals.    cranberry extract 200 mg Cap Take 1 capsule by mouth once daily.    diphenoxylate-atropine 2.5-0.025 mg (LOMOTIL) 2.5-0.025 mg per tablet Take 1 tablet by mouth 3 (three) times daily as needed for Diarrhea.    donepeziL (ARICEPT) 10 MG tablet TAKE 1 TABLET(10 MG) BY MOUTH EVERY EVENING    DULoxetine (CYMBALTA) 30 MG capsule Take 1 capsule (30 mg total) by mouth 2 (two) times daily.    ferrous sulfate 324 mg (65 mg iron) TbEC Take 1 tablet (324 mg total) by mouth once daily.    flash glucose scanning reader ([employer]YLE EFREN 14 DAY READER) Misc Check blood glucose level 3 times daily.    flash glucose sensor (FREESTYLE EFREN 14 DAY SENSOR) Kit APPLY 1 SENSOR EVERY 14 DAYS    gabapentin (NEURONTIN) 300 MG capsule Take 1 to 2 capsules 3 times daily if needed for leg pain.    HYDROcodone-acetaminophen (NORCO) 5-325 mg per tablet Take 1 tablet by mouth every 6 (six) hours as needed for Pain.    hydrocortisone 2.5 % cream Apply topically 2 (two) times daily as needed.    lactulose (CHRONULAC) 20 gram/30 mL Soln Take 30 mLs (20 g total) by mouth 3 (three) times daily as needed (Constipation).    lancets (FREESTYLE LANCETS) 28 gauge Misc 30 lancets by Misc.(Non-Drug; Combo Route) route Daily.    levothyroxine (SYNTHROID) 75 MCG tablet TAKE 1 TABLET(75 MCG) BY MOUTH BEFORE BREAKFAST    levothyroxine (SYNTHROID) 75 MCG tablet TAKE 1 TABLET BY MOUTH BEFORE  BREAKFAST    LIDOcaine (LIDODERM) 5 % APPLY 1 TO 3 PATCHES TO BACK EVERY DAY. REMOVE WITHIN 12 HOURS    linaGLIPtin (TRADJENTA) 5 mg Tab tablet TAKE 1 TABLET(5 MG) BY MOUTH EVERY DAY    loperamide (IMODIUM) 2 mg capsule Take 1 capsule (2 mg total) by mouth 2 (two) times daily as needed for  Diarrhea.    loperamide (IMODIUM) 2 mg capsule Take 1 capsule (2 mg total) by mouth 4 (four) times daily as needed for Diarrhea.    magnesium 30 mg Tab Take 30 mg by mouth once. Patient does not know actual dose in MG    magnesium citrate solution Take 300 mLs by mouth.    metFORMIN (GLUCOPHAGE) 1000 MG tablet TAKE 1 TABLET(1000 MG) BY MOUTH TWICE DAILY WITH MEALS    mometasone (ELOCON) 0.1 % ointment Apply topically 2 (two) times a day. Mix 50/50 with mupirocin ointment. Apply in each nostril twice daily.    montelukast (SINGULAIR) 10 mg tablet Take 1 tablet (10 mg total) by mouth once daily.    ondansetron (ZOFRAN-ODT) 8 MG TbDL Take 1 tablet (8 mg total) by mouth every 8 (eight) hours as needed (nausea).    ospemifene (OSPHENA) 60 mg Tab Take by mouth.    pantoprazole (PROTONIX) 20 MG tablet TAKE 1 TABLET(20 MG) BY MOUTH TWICE DAILY    polyethylene glycol (GLYCOLAX) 17 gram PwPk Take 17 g by mouth once daily.    pregabalin (LYRICA) 100 MG capsule Take 100 mg by mouth.    PREMARIN vaginal cream INSERT 0.5 GRAM VAGINALLY 2 TIMES A WEEK    rifAXIMin (XIFAXAN) 550 mg Tab Take 1 tablet (550 mg total) by mouth 2 (two) times daily.    traZODone (DESYREL) 50 MG tablet TAKE 1 TABLET(50 MG) BY MOUTH EVERY EVENING    triamcinolone acetonide 0.1% (KENALOG) 0.1 % paste 3 (three) times daily.    ubrogepant (UBRELVY) 100 mg tablet Take 1 tablet daily as needed for migraine headache. May take 1 additional tablet 2 hours later if needed.    valACYclovir (VALTREX) 1000 MG tablet Take 1 tablet (1,000 mg total) by mouth once daily.    [DISCONTINUED] hyoscyamine (ANASPAZ,LEVSIN) 0.125 mg Tab TAKE 1 TABLET(125 MCG) BY MOUTH EVERY 8 HOURS AS NEEDED FOR URGENCY     Family History       Problem Relation (Age of Onset)    Alcohol abuse Brother, Brother, Brother    Arthritis Daughter, Brother, Brother    Asthma Daughter, Daughter    Bladder Cancer Mother    Breast cancer Sister (79)    Cataracts Mother    Colon cancer Father, Brother (70)     Depression Daughter, Daughter, Daughter    Glaucoma Mother    Heart disease Father (50), Mother    Hyperlipidemia Mother    Hypertension Daughter    Kidney disease Mother    Parkinsonism Brother    Stroke Daughter (40), Daughter          Tobacco Use    Smoking status: Never    Smokeless tobacco: Never   Substance and Sexual Activity    Alcohol use: Not Currently     Comment: socially, rarely    Drug use: Never    Sexual activity: Not Currently     Review of Systems   Constitutional:  Negative for appetite change, chills and fever.   Respiratory:  Negative for shortness of breath and wheezing.    Cardiovascular:  Negative for chest pain, palpitations and leg swelling.   Gastrointestinal:  Positive for abdominal pain and nausea. Negative for abdominal distention, constipation, diarrhea and vomiting.   Genitourinary:  Negative for decreased urine volume, difficulty urinating, dysuria, frequency and urgency.   Musculoskeletal:  Positive for back pain (chronic).   Neurological:  Negative for weakness, light-headedness and headaches.     Objective:     Vital Signs (Most Recent):  Temp: 97.8 °F (36.6 °C) (03/28/25 2208)  Pulse: 65 (03/28/25 2208)  Resp: 14 (03/28/25 2208)  BP: (!) 147/70 (03/28/25 2208)  SpO2: 96 % (03/28/25 2208) Vital Signs (24h Range):  Temp:  [97.8 °F (36.6 °C)-98.1 °F (36.7 °C)] 97.8 °F (36.6 °C)  Pulse:  [65-90] 65  Resp:  [14-20] 14  SpO2:  [96 %-97 %] 96 %  BP: (124-147)/(70-90) 147/70     Weight: 58.1 kg (128 lb)  Body mass index is 23.41 kg/m².     Physical Exam  Vitals and nursing note reviewed.   Constitutional:       General: She is not in acute distress.     Appearance: She is not ill-appearing, toxic-appearing or diaphoretic.   HENT:      Head: Normocephalic and atraumatic.      Mouth/Throat:      Mouth: Mucous membranes are moist.      Pharynx: Oropharynx is clear. No oropharyngeal exudate.   Eyes:      General: No scleral icterus.     Extraocular Movements: Extraocular movements intact.       Conjunctiva/sclera: Conjunctivae normal.   Cardiovascular:      Rate and Rhythm: Normal rate and regular rhythm.      Pulses: Normal pulses.      Heart sounds: Murmur heard.   Pulmonary:      Effort: Pulmonary effort is normal. No respiratory distress.      Breath sounds: Normal breath sounds. No wheezing.   Abdominal:      General: Abdomen is flat. Bowel sounds are normal. There is no distension.      Palpations: Abdomen is soft.      Tenderness: There is abdominal tenderness (diffuse). There is no guarding or rebound.   Skin:     General: Skin is warm and dry.      Coloration: Skin is not jaundiced.   Neurological:      Mental Status: She is alert and oriented to person, place, and time.      Cranial Nerves: No dysarthria or facial asymmetry.      Motor: No weakness.           Significant Labs: All pertinent labs within the past 24 hours have been reviewed.  CBC:   Recent Labs   Lab 03/28/25  1715   WBC 5.52   HGB 11.5*   HCT 35.5*   PLT 93*     CMP:   Recent Labs   Lab 03/28/25  1715      K 4.5      CO2 20*   BUN 11   CREATININE 1.0   CALCIUM 10.2   ALBUMIN 4.0   BILITOT 0.9   ALKPHOS 74   AST 27   ALT 21   ANIONGAP 9     Lipase:   Recent Labs   Lab 03/28/25  1715   LIPASE 23       Significant Imaging: I have reviewed all pertinent imaging results/findings within the past 24 hours.    CT Abdomen Pelvis With IV Contrast NO Oral Contrast  Narrative: EXAMINATION:  CT ABDOMEN PELVIS WITH IV CONTRAST    CLINICAL HISTORY:  Abdominal pain, acute, nonlocalized;    TECHNIQUE:  Low dose axial images, sagittal and coronal reformations were obtained from the lung bases to the pubic symphysis following the IV administration of 75 mL of Omnipaque 350 .  Oral contrast was not given.    COMPARISON:  CT 02/11/2025    FINDINGS:  Images of the lower thorax are remarkable for a 2-3 mm pulmonary nodule within the anterior aspect of the right upper lobe.  There is bilateral basilar dependent atelectasis.    The liver  is hypoattenuating, may reflect underlying steatosis, correlation with LFTs recommended.  The spleen and adrenal glands are unremarkable.  There is atrophic change of the pancreas without pancreatic ductal dilation.  The gallbladder is surgically absent, no significant biliary dilation status post cholecystectomy.  The stomach is distended with fluid.  There is thickening at the pylorus, may be on the basis of peristalsis.  The portal vein, splenic vein, SMV, celiac axis and SMA all are patent.  There are a few scattered prominent abdominal lymph nodes particularly in the olga hepatic and gastrohepatic regions.    The kidneys enhance symmetrically without nephrolithiasis.  Low attenuating lesions arise from the right kidney, too small for characterization.  No left hydronephrosis.  There is mild right hydronephrosis.  The bilateral ureters are otherwise unremarkable without convincing calculi seen.  The urinary bladder is distended without wall thickening.  The uterus is absent the adnexa is unremarkable.    There is moderate to large amount of stool throughout the colon.  The terminal ileum is unremarkable.  The appendix is not identified.  There is liquid stool within the cecum.  There is decompression of the distal small bowel.  There is fluid distension of the proximal to mid small bowel noting scattered regions of wall thickening.  Several small bowel loops are closely tethered to the anterior abdominal wall.  There are surgical changes of small bowel anastomoses, the anastomoses appear patent.  No findings to suggest pneumatosis.  No free air.  There are several scattered shotty periaortic, pericaval, and mesenteric lymph nodes.  There is atherosclerotic calcification of the aorta and its branches.  No convincing focal organized pelvic fluid collection.    There are degenerative changes of the bilateral femoroacetabular joints, pubic symphysis, sacroiliac joints and spine.  There is grade 1/grade 2  anterolisthesis of L5 on S1 noting bilateral pars defects at L5.  No significant inguinal lymphadenopathy.  Impression: This report was flagged in Epic as abnormal.    1. There is surgical change of partial small-bowel resection noting the anastomoses appear patent.  There are multiple dilated proximal to mid small bowel loops noting relative decompression of the distal small bowel.  No convincing transition point is identified however developing high-grade obstruction on the basis of adhesion is a consideration.  No findings to suggest pneumatosis or free air at this time.  Correlation and follow-up is advised.  2. Findings suggest hepatic steatosis, correlation with LFTs recommended.  3. Prominent abdominal lymph nodes, similar to the previous exam, possibly reactive.  4. Please see above for several additional findings.    Electronically signed by: Duglas Alcantara MD  Date:    03/28/2025  Time:    19:44

## 2025-03-29 NOTE — H&P
[facility]  History & Physical    Patient Name: Anayeli Judd  MRN: [id number]  Patient Class: IP- Inpatient  Admission Date: 3/28/2025  Attending Physician: Angie Trevino MD   Primary Care Provider: Darci Guthrie MD       Patient information was obtained from patient, past medical records, and ER records.     Subjective:     Principal Problem:Partial small bowel obstruction    Chief Complaint:   Chief Complaint   Patient presents with    Abdominal Pain     Lower abd pain,         HPI: Anayeli Judd is an 80 year old female with hx of colon cancer s/p transverse colectomy (2011), severe abdominal wall adhesions with locked in bowel, recurrent small bowel obstruction s/p multiple ex laps and partial SBR, type 2 NIDDM, HLD, hypothyroidism, GERD, compensated metALD cirrhosis c/b esophageal varices, portal hypertension, prior DVT who presented to [facility] ED for c/o abdominal pain. Patient notes chronic issues of abdominal pain related to her extensive surgical history with current symptom onset being the morning PTA after eating breakfast. The pain initially began in the lower abdominal quadrants, gradually radiated to involve the entire abdomen, and worsened with oral intake of food/water. Associated symptoms include nausea. LBM was also noted to be the morning PTA. Denies fever/chills, vomiting, chest pain, shortness of breath, difficulty urinating, dysuria. Pain currently rated 3/10 after receiving pain medications, previously rated 10/10 upon symptom onset.     Patient remained afebrile and hemodynamically stable while in the ED. Labs largely unremarkable other than stable macrocytic anemia, no leukocytosis or acute electrolyte abnormalities noted. CTAP w contrast showed moderate to large amount of colonic stool burden, surgical changes related to partial SMR with patent anastomoses, multiple dilated proximal to mid small bowel loops noting relative decompression of the distal small  bowel, and no convincing transition point or pneumatosis. Given morphine 4 mg IV x2, zofran 4 mg IV with symptomatic relief, as well as, 1L IV fluid bolus and IV benadryl 12.5 mg for itching. Admitted to hospital medicine for further management.     Past Medical History:   Diagnosis Date    Abdominal pain     Allergic rhinitis     Arthritis     Blood platelet disorder     Blood platelet disorder     Blood transfusion     during delivery and     Bowel obstruction     Cervical radiculopathy     followed by dr cloud    Cirrhosis of liver     Colon cancer     transverse colon; resected; Stage IIA (pT3 pN0 MX)    Degenerative joint disease (DJD) of lumbar spine     Diabetes mellitus     Diarrhea     Falls 2020    Family history of breast cancer     Family history of colon cancer     Fatty liver     GERD (gastroesophageal reflux disease)     History of shingles     Hyperlipidemia     Hypomagnesemia     Hypothyroidism     Irritable bowel syndrome     Microscopic colitis     treated     Migraine     Myelodysplastic syndrome     Raynaud phenomenon     Raynaud's disease     Squamous cell carcinoma of skin     Type 2 diabetes mellitus     Usual hyperplasia of lactiferous duct        Past Surgical History:   Procedure Laterality Date    ADENOIDECTOMY      APPENDECTOMY      BACK SURGERY      BREAST BIOPSY  1970    BREAST CYST EXCISION  1970?    BREAST LUMPECTOMY  1970    CARPAL TUNNEL RELEASE      bilateral      SECTION      CHOLECYSTECTOMY  1965    COLECTOMY  2011    Transverse colon resection by Dr. Aguirre    COLONOSCOPY N/A 2017    Procedure: COLONOSCOPY;  Surgeon: Manjit Alvarez MD;  Location: [address]);  Service: Endoscopy;  Laterality: N/A;    COLONOSCOPY N/A 2019    Procedure: COLONOSCOPY;  Surgeon: Ramiro Jefferson MD;  Location: [address]);  Service: Endoscopy;  Laterality: N/A;  PM prep    COLONOSCOPY N/A 2022    Procedure: COLONOSCOPY;   Surgeon: Ramiro Jefferson MD;  Location: [facility] (2ND FLR);  Service: Endoscopy;  Laterality: N/A;  EGD and colonoscopy in 6-8 weeks from now     states has constipation. -extended Golytely prep    CYSTOSCOPY N/A 11/09/2021    Procedure: CYSTOSCOPY;  Surgeon: Viridiana Valenzuela MD;  Location: [facility] OR 1ST FLR;  Service: Urology;  Laterality: N/A;    ENDOSCOPIC ULTRASOUND OF UPPER GASTROINTESTINAL TRACT N/A 12/12/2018    Procedure: ULTRASOUND, UPPER GI TRACT, ENDOSCOPIC;  Surgeon: Jose Hess MD;  Location: [facility];  Service: Endoscopy;  Laterality: N/A;    ENDOSCOPIC ULTRASOUND OF UPPER GASTROINTESTINAL TRACT N/A 01/23/2019    Procedure: ULTRASOUND, UPPER GI TRACT, ENDOSCOPIC;  Surgeon: Jose Hess MD;  Location: [facility] (2ND FLR);  Service: Endoscopy;  Laterality: N/A;    ENDOSCOPIC ULTRASOUND OF UPPER GASTROINTESTINAL TRACT N/A 10/25/2023    Procedure: ULTRASOUND, UPPER GI TRACT, ENDOSCOPIC;  Surgeon: Jose Campos MD;  Location: [facility];  Service: Endoscopy;  Laterality: N/A;  10/20/23: instructions sent via portal-GD    ESOPHAGOGASTRODUODENOSCOPY N/A 11/16/2018    Procedure: EGD (ESOPHAGOGASTRODUODENOSCOPY);  Surgeon: Angelo Reynolds MD;  Location: [facility] (2ND FLR);  Service: Endoscopy;  Laterality: N/A;    ESOPHAGOGASTRODUODENOSCOPY N/A 06/26/2019    Procedure: EGD (ESOPHAGOGASTRODUODENOSCOPY);  Surgeon: Ramiro Jefferson MD;  Location: [facility] (4TH FLR);  Service: Endoscopy;  Laterality: N/A;    ESOPHAGOGASTRODUODENOSCOPY N/A 05/04/2022    Procedure: EGD (ESOPHAGOGASTRODUODENOSCOPY);  Surgeon: Ramiro Jefferson MD;  Location: [facility] (2ND FLR);  Service: Endoscopy;  Laterality: N/A;  fully vaccinated-GT    ESOPHAGOGASTRODUODENOSCOPY N/A 10/25/2023    Procedure: EGD (ESOPHAGOGASTRODUODENOSCOPY);  Surgeon: Jose Campos MD;  Location: [facility];  Service: Endoscopy;  Laterality: N/A;    ESOPHAGOGASTRODUODENOSCOPY N/A 11/15/2023    Procedure: ESOPHAGOGASTRODUODENOSCOPY (EGD);   Surgeon: Db Sanchez MD;  Location: [facility] ENDO (4TH FLR);  Service: Endoscopy;  Laterality: N/A;  New diagnosis of esophageal varices. Not a candidate for beta blocker due to soft blood pressures. Recommend EV banding.  PT/INR done on 11/3/23 and CBC done on 9/30/23  ok per Dr. Espinoza to be done by 1st available provider-see tele encounter-st  1    EYE SURGERY      Cataract Removal    FLUOROSCOPIC URODYNAMIC STUDY N/A 11/09/2021    Procedure: URODYNAMIC STUDY, FLUOROSCOPIC;  Surgeon: Viridiana Valenzuela MD;  Location: [facility] OR 1ST FLR;  Service: Urology;  Laterality: N/A;  90 minutes     HYSTERECTOMY      JOINT REPLACEMENT      OOPHORECTOMY  1970    posterolateral fusion with autograft bone and Libby mineralized bone matrix  02/01/2013    at [facility] for lumbar spine stenosis    SMALL INTESTINE SURGERY      SPINE SURGERY      TOE SURGERY      TONSILLECTOMY      TRANSFORAMINAL EPIDURAL INJECTION OF STEROID Bilateral 12/21/2022    Procedure: Injection,steroid,epidural, Todd L5/S1 TF CONTRAST DIRECT REFERRAL;  Surgeon: Toni Osorio MD;  Location: [facility] PAIN MGT;  Service: Pain Management;  Laterality: Bilateral;    TRIGGER FINGER RELEASE         Review of patient's allergies indicates:   Allergen Reactions    Codeine Itching and Nausea And Vomiting    Dilaudid [hydromorphone (bulk)] Other (See Comments)     Oversedating, head burning. Pt prefers to avoid.       Percocet [oxycodone-acetaminophen] Itching    Sulfa (sulfonamide antibiotics) Itching and Nausea And Vomiting    Dilaudid [hydromorphone]     Latex, natural rubber Rash    Opioids - morphine analogues Rash       No current facility-administered medications on file prior to encounter.     Current Outpatient Medications on File Prior to Encounter   Medication Sig    acetaminophen (TYLENOL) 650 MG TbSR Take 1,300 mg by mouth 2 (two) times a day.    ascorbic acid, vitamin C, (VITAMIN C) 1000 MG tablet Take 1,000 mg by mouth once daily.    azelastine (ASTELIN) 137  mcg (0.1 %) nasal spray 1 spray (137 mcg total) by Nasal route 2 (two) times daily.    biotin 10,000 mcg TbDL Take 1 tablet by mouth once daily.     blood sugar diagnostic (FREESTYLE LITE STRIPS) Strp 1 strip by Misc.(Non-Drug; Combo Route) route 3 (three) times daily.    blood-glucose meter kit Use as instructed    calcium-vitamin D3 (OS-KASSANDRA 500 + D3) 500 mg-5 mcg (200 unit) per tablet Take 1 tablet by mouth once daily.    cholestyramine (QUESTRAN) 4 gram packet Take 1 packet (4 g total) by mouth 3 (three) times daily with meals.    cranberry extract 200 mg Cap Take 1 capsule by mouth once daily.    diphenoxylate-atropine 2.5-0.025 mg (LOMOTIL) 2.5-0.025 mg per tablet Take 1 tablet by mouth 3 (three) times daily as needed for Diarrhea.    donepeziL (ARICEPT) 10 MG tablet TAKE 1 TABLET(10 MG) BY MOUTH EVERY EVENING    DULoxetine (CYMBALTA) 30 MG capsule Take 1 capsule (30 mg total) by mouth 2 (two) times daily.    ferrous sulfate 324 mg (65 mg iron) TbEC Take 1 tablet (324 mg total) by mouth once daily.    flash glucose scanning reader (FREESTYLE EFREN 14 DAY READER) Misc Check blood glucose level 3 times daily.    flash glucose sensor (FREESTYLE EFREN 14 DAY SENSOR) Kit APPLY 1 SENSOR EVERY 14 DAYS    gabapentin (NEURONTIN) 300 MG capsule Take 1 to 2 capsules 3 times daily if needed for leg pain.    HYDROcodone-acetaminophen (NORCO) 5-325 mg per tablet Take 1 tablet by mouth every 6 (six) hours as needed for Pain.    hydrocortisone 2.5 % cream Apply topically 2 (two) times daily as needed.    lactulose (CHRONULAC) 20 gram/30 mL Soln Take 30 mLs (20 g total) by mouth 3 (three) times daily as needed (Constipation).    lancets (FREESTYLE LANCETS) 28 gauge Misc 30 lancets by Misc.(Non-Drug; Combo Route) route Daily.    levothyroxine (SYNTHROID) 75 MCG tablet TAKE 1 TABLET(75 MCG) BY MOUTH BEFORE BREAKFAST    levothyroxine (SYNTHROID) 75 MCG tablet TAKE 1 TABLET BY MOUTH BEFORE  BREAKFAST    LIDOcaine (LIDODERM) 5 %  APPLY 1 TO 3 PATCHES TO BACK EVERY DAY. REMOVE WITHIN 12 HOURS    linaGLIPtin (TRADJENTA) 5 mg Tab tablet TAKE 1 TABLET(5 MG) BY MOUTH EVERY DAY    loperamide (IMODIUM) 2 mg capsule Take 1 capsule (2 mg total) by mouth 2 (two) times daily as needed for Diarrhea.    loperamide (IMODIUM) 2 mg capsule Take 1 capsule (2 mg total) by mouth 4 (four) times daily as needed for Diarrhea.    magnesium 30 mg Tab Take 30 mg by mouth once. Patient does not know actual dose in MG    magnesium citrate solution Take 300 mLs by mouth.    metFORMIN (GLUCOPHAGE) 1000 MG tablet TAKE 1 TABLET(1000 MG) BY MOUTH TWICE DAILY WITH MEALS    mometasone (ELOCON) 0.1 % ointment Apply topically 2 (two) times a day. Mix 50/50 with mupirocin ointment. Apply in each nostril twice daily.    montelukast (SINGULAIR) 10 mg tablet Take 1 tablet (10 mg total) by mouth once daily.    ondansetron (ZOFRAN-ODT) 8 MG TbDL Take 1 tablet (8 mg total) by mouth every 8 (eight) hours as needed (nausea).    ospemifene (OSPHENA) 60 mg Tab Take by mouth.    pantoprazole (PROTONIX) 20 MG tablet TAKE 1 TABLET(20 MG) BY MOUTH TWICE DAILY    polyethylene glycol (GLYCOLAX) 17 gram PwPk Take 17 g by mouth once daily.    pregabalin (LYRICA) 100 MG capsule Take 100 mg by mouth.    PREMARIN vaginal cream INSERT 0.5 GRAM VAGINALLY 2 TIMES A WEEK    rifAXIMin (XIFAXAN) 550 mg Tab Take 1 tablet (550 mg total) by mouth 2 (two) times daily.    traZODone (DESYREL) 50 MG tablet TAKE 1 TABLET(50 MG) BY MOUTH EVERY EVENING    triamcinolone acetonide 0.1% (KENALOG) 0.1 % paste 3 (three) times daily.    ubrogepant (UBRELVY) 100 mg tablet Take 1 tablet daily as needed for migraine headache. May take 1 additional tablet 2 hours later if needed.    valACYclovir (VALTREX) 1000 MG tablet Take 1 tablet (1,000 mg total) by mouth once daily.    [DISCONTINUED] hyoscyamine (ANASPAZ,LEVSIN) 0.125 mg Tab TAKE 1 TABLET(125 MCG) BY MOUTH EVERY 8 HOURS AS NEEDED FOR URGENCY     Family History        Problem Relation (Age of Onset)    Alcohol abuse Brother, Brother, Brother    Arthritis Daughter, Brother, Brother    Asthma Daughter, Daughter    Bladder Cancer Mother    Breast cancer Sister (79)    Cataracts Mother    Colon cancer Father, Brother (70)    Depression Daughter, Daughter, Daughter    Glaucoma Mother    Heart disease Father (50), Mother    Hyperlipidemia Mother    Hypertension Daughter    Kidney disease Mother    Parkinsonism Brother    Stroke Daughter (40), Daughter          Tobacco Use    Smoking status: Never    Smokeless tobacco: Never   Substance and Sexual Activity    Alcohol use: Not Currently     Comment: socially, rarely    Drug use: Never    Sexual activity: Not Currently     Review of Systems   Constitutional:  Negative for appetite change, chills and fever.   Respiratory:  Negative for shortness of breath and wheezing.    Cardiovascular:  Negative for chest pain, palpitations and leg swelling.   Gastrointestinal:  Positive for abdominal pain and nausea. Negative for abdominal distention, constipation, diarrhea and vomiting.   Genitourinary:  Negative for decreased urine volume, difficulty urinating, dysuria, frequency and urgency.   Musculoskeletal:  Positive for back pain (chronic).   Neurological:  Negative for weakness, light-headedness and headaches.     Objective:     Vital Signs (Most Recent):  Temp: 97.8 °F (36.6 °C) (03/28/25 2208)  Pulse: 65 (03/28/25 2208)  Resp: 14 (03/28/25 2208)  BP: (!) 147/70 (03/28/25 2208)  SpO2: 96 % (03/28/25 2208) Vital Signs (24h Range):  Temp:  [97.8 °F (36.6 °C)-98.1 °F (36.7 °C)] 97.8 °F (36.6 °C)  Pulse:  [65-90] 65  Resp:  [14-20] 14  SpO2:  [96 %-97 %] 96 %  BP: (124-147)/(70-90) 147/70     Weight: 58.1 kg (128 lb)  Body mass index is 23.41 kg/m².     Physical Exam  Vitals and nursing note reviewed.   Constitutional:       General: She is not in acute distress.     Appearance: She is not ill-appearing, toxic-appearing or diaphoretic.   HENT:       Head: Normocephalic and atraumatic.      Mouth/Throat:      Mouth: Mucous membranes are moist.      Pharynx: Oropharynx is clear. No oropharyngeal exudate.   Eyes:      General: No scleral icterus.     Extraocular Movements: Extraocular movements intact.      Conjunctiva/sclera: Conjunctivae normal.   Cardiovascular:      Rate and Rhythm: Normal rate and regular rhythm.      Pulses: Normal pulses.      Heart sounds: Murmur heard.   Pulmonary:      Effort: Pulmonary effort is normal. No respiratory distress.      Breath sounds: Normal breath sounds. No wheezing.   Abdominal:      General: Abdomen is flat. Bowel sounds are normal. There is no distension.      Palpations: Abdomen is soft.      Tenderness: There is abdominal tenderness (diffuse). There is no guarding or rebound.   Skin:     General: Skin is warm and dry.      Coloration: Skin is not jaundiced.   Neurological:      Mental Status: She is alert and oriented to person, place, and time.      Cranial Nerves: No dysarthria or facial asymmetry.      Motor: No weakness.           Significant Labs: All pertinent labs within the past 24 hours have been reviewed.  CBC:   Recent Labs   Lab 03/28/25  1715   WBC 5.52   HGB 11.5*   HCT 35.5*   PLT 93*     CMP:   Recent Labs   Lab 03/28/25  1715      K 4.5      CO2 20*   BUN 11   CREATININE 1.0   CALCIUM 10.2   ALBUMIN 4.0   BILITOT 0.9   ALKPHOS 74   AST 27   ALT 21   ANIONGAP 9     Lipase:   Recent Labs   Lab 03/28/25  1715   LIPASE 23       Significant Imaging: I have reviewed all pertinent imaging results/findings within the past 24 hours.    CT Abdomen Pelvis With IV Contrast NO Oral Contrast  Narrative: EXAMINATION:  CT ABDOMEN PELVIS WITH IV CONTRAST    CLINICAL HISTORY:  Abdominal pain, acute, nonlocalized;    TECHNIQUE:  Low dose axial images, sagittal and coronal reformations were obtained from the lung bases to the pubic symphysis following the IV administration of 75 mL of Omnipaque 350 .  Oral  contrast was not given.    COMPARISON:  CT 02/11/2025    FINDINGS:  Images of the lower thorax are remarkable for a 2-3 mm pulmonary nodule within the anterior aspect of the right upper lobe.  There is bilateral basilar dependent atelectasis.    The liver is hypoattenuating, may reflect underlying steatosis, correlation with LFTs recommended.  The spleen and adrenal glands are unremarkable.  There is atrophic change of the pancreas without pancreatic ductal dilation.  The gallbladder is surgically absent, no significant biliary dilation status post cholecystectomy.  The stomach is distended with fluid.  There is thickening at the pylorus, may be on the basis of peristalsis.  The portal vein, splenic vein, SMV, celiac axis and SMA all are patent.  There are a few scattered prominent abdominal lymph nodes particularly in the olga hepatic and gastrohepatic regions.    The kidneys enhance symmetrically without nephrolithiasis.  Low attenuating lesions arise from the right kidney, too small for characterization.  No left hydronephrosis.  There is mild right hydronephrosis.  The bilateral ureters are otherwise unremarkable without convincing calculi seen.  The urinary bladder is distended without wall thickening.  The uterus is absent the adnexa is unremarkable.    There is moderate to large amount of stool throughout the colon.  The terminal ileum is unremarkable.  The appendix is not identified.  There is liquid stool within the cecum.  There is decompression of the distal small bowel.  There is fluid distension of the proximal to mid small bowel noting scattered regions of wall thickening.  Several small bowel loops are closely tethered to the anterior abdominal wall.  There are surgical changes of small bowel anastomoses, the anastomoses appear patent.  No findings to suggest pneumatosis.  No free air.  There are several scattered shotty periaortic, pericaval, and mesenteric lymph nodes.  There is atherosclerotic  calcification of the aorta and its branches.  No convincing focal organized pelvic fluid collection.    There are degenerative changes of the bilateral femoroacetabular joints, pubic symphysis, sacroiliac joints and spine.  There is grade 1/grade 2 anterolisthesis of L5 on S1 noting bilateral pars defects at L5.  No significant inguinal lymphadenopathy.  Impression: This report was flagged in Epic as abnormal.    1. There is surgical change of partial small-bowel resection noting the anastomoses appear patent.  There are multiple dilated proximal to mid small bowel loops noting relative decompression of the distal small bowel.  No convincing transition point is identified however developing high-grade obstruction on the basis of adhesion is a consideration.  No findings to suggest pneumatosis or free air at this time.  Correlation and follow-up is advised.  2. Findings suggest hepatic steatosis, correlation with LFTs recommended.  3. Prominent abdominal lymph nodes, similar to the previous exam, possibly reactive.  4. Please see above for several additional findings.    Electronically signed by: Duglas Alcantara MD  Date:    03/28/2025  Time:    19:44      Assessment/Plan:     Assessment & Plan  Partial small bowel obstruction  80F with hx of colon cancer s/p transverse colectomy, severe abdominal wall adhesions with locked in bowel, recurrent small bowel obstruction s/p multiple ex laps and SBR, type 2 NIDDM, HLD, hypothyroidism, GERD, compensated metALD cirrhosis c/b esophageal varices, portal hypertension, prior DVT who presented to [facility] ED for c/o abdominal pain.  Associated symptoms include nausea and PO aversion, no vomiting.     CTAP with contrast showed multiple dilated proximal to mid small bowel loops noting relative decompression of the distal small bowel, no convincing transition point, and fluid filled/dilated stomach.    -general surgery consulted, appreciate recs  -maintain NG tube to LIWS for bowel  "rest/decompression  -remain NPO for now, held oral meds  -maintenance IV fluids   -PRN antiemetics and pain control  Acquired hypothyroidism  -hold home levothyroxine 75 mcg daily for now  -may need to convert to IV alternative if unable to tolerate off LIWS    GERD (gastroesophageal reflux disease)  -hold home PPI unable able to tolerate PO    Type 2 diabetes mellitus without complication, without long-term current use of insulin  Patient's FSGs are controlled on current medication regimen.  Last A1c reviewed-   Lab Results   Component Value Date    HGBA1C 7.0 (H) 01/25/2024     Most recent fingerstick glucose reviewed- No results for input(s): "POCTGLUCOSE" in the last 24 hours.  Current correctional scale  Low  Maintain anti-hyperglycemic dose as follows-   Antihyperglycemics (From admission, onward)      Start     Stop Route Frequency Ordered    03/29/25 0256  insulin aspart U-100 pen 0-5 Units         -- SubQ Every 6 hours PRN 03/29/25 0156          -hold oral hypoglycemics while patient is in the hospital.  -repeat hemoglobin A1c  -accuchecks Q6H while NPO    Generalized abdominal pain  -see partial small bowel obstruction    VTE Risk Mitigation (From admission, onward)           Ordered     enoxaparin injection 40 mg  Every 24 hours         03/28/25 2109     IP VTE HIGH RISK PATIENT  Once         03/28/25 2109     Place sequential compression device  Until discontinued         03/28/25 2109                          Vale Mir MD  Department of Hospital Medicine  [facility]          "

## 2025-03-30 LAB
ABSOLUTE EOSINOPHIL (OHS): 0.12 K/UL
ABSOLUTE MONOCYTE (OHS): 0.38 K/UL (ref 0.3–1)
ABSOLUTE NEUTROPHIL COUNT (OHS): 2.42 K/UL (ref 1.8–7.7)
ALBUMIN SERPL BCP-MCNC: 3.1 G/DL (ref 3.5–5.2)
ALP SERPL-CCNC: 93 UNIT/L (ref 40–150)
ALT SERPL W/O P-5'-P-CCNC: 62 UNIT/L (ref 10–44)
ANION GAP (OHS): 10 MMOL/L (ref 8–16)
AST SERPL-CCNC: 73 UNIT/L (ref 11–45)
BASOPHILS # BLD AUTO: 0.01 K/UL
BASOPHILS NFR BLD AUTO: 0.2 %
BILIRUB SERPL-MCNC: 1.4 MG/DL (ref 0.1–1)
BUN SERPL-MCNC: 9 MG/DL (ref 8–23)
CALCIUM SERPL-MCNC: 9.1 MG/DL (ref 8.7–10.5)
CHLORIDE SERPL-SCNC: 105 MMOL/L (ref 95–110)
CO2 SERPL-SCNC: 21 MMOL/L (ref 23–29)
CREAT SERPL-MCNC: 0.7 MG/DL (ref 0.5–1.4)
ERYTHROCYTE [DISTWIDTH] IN BLOOD BY AUTOMATED COUNT: 13.3 % (ref 11.5–14.5)
GFR SERPLBLD CREATININE-BSD FMLA CKD-EPI: >60 ML/MIN/1.73/M2
GLUCOSE SERPL-MCNC: 99 MG/DL (ref 70–110)
HCT VFR BLD AUTO: 31 % (ref 37–48.5)
HGB BLD-MCNC: 10.3 GM/DL (ref 12–16)
IMM GRANULOCYTES # BLD AUTO: 0.02 K/UL (ref 0–0.04)
IMM GRANULOCYTES NFR BLD AUTO: 0.5 % (ref 0–0.5)
LYMPHOCYTES # BLD AUTO: 1.1 K/UL (ref 1–4.8)
MAGNESIUM SERPL-MCNC: 1.3 MG/DL (ref 1.6–2.6)
MCH RBC QN AUTO: 34.3 PG (ref 27–50)
MCHC RBC AUTO-ENTMCNC: 33.2 G/DL (ref 32–36)
MCV RBC AUTO: 103 FL (ref 82–98)
NUCLEATED RBC (/100WBC) (OHS): 0 /100 WBC
PHOSPHATE SERPL-MCNC: 4 MG/DL (ref 2.7–4.5)
PLATELET # BLD AUTO: 61 K/UL (ref 150–450)
PMV BLD AUTO: 10.8 FL (ref 9.2–12.9)
POCT GLUCOSE: 101 MG/DL (ref 70–110)
POCT GLUCOSE: 111 MG/DL (ref 70–110)
POCT GLUCOSE: 112 MG/DL (ref 70–110)
POCT GLUCOSE: 188 MG/DL (ref 70–110)
POTASSIUM SERPL-SCNC: 4 MMOL/L (ref 3.5–5.1)
PROT SERPL-MCNC: 6.1 GM/DL (ref 6–8.4)
RBC # BLD AUTO: 3 M/UL (ref 4–5.4)
RELATIVE EOSINOPHIL (OHS): 3 %
RELATIVE LYMPHOCYTE (OHS): 27.2 % (ref 18–48)
RELATIVE MONOCYTE (OHS): 9.4 % (ref 4–15)
RELATIVE NEUTROPHIL (OHS): 59.7 % (ref 38–73)
SODIUM SERPL-SCNC: 136 MMOL/L (ref 136–145)
WBC # BLD AUTO: 4.05 K/UL (ref 3.9–12.7)

## 2025-03-30 PROCEDURE — 84100 ASSAY OF PHOSPHORUS: CPT

## 2025-03-30 PROCEDURE — 20600001 HC STEP DOWN PRIVATE ROOM

## 2025-03-30 PROCEDURE — 63600175 PHARM REV CODE 636 W HCPCS

## 2025-03-30 PROCEDURE — 25000003 PHARM REV CODE 250

## 2025-03-30 PROCEDURE — 36415 COLL VENOUS BLD VENIPUNCTURE: CPT

## 2025-03-30 PROCEDURE — 83735 ASSAY OF MAGNESIUM: CPT

## 2025-03-30 PROCEDURE — 85025 COMPLETE CBC W/AUTO DIFF WBC: CPT

## 2025-03-30 PROCEDURE — 80053 COMPREHEN METABOLIC PANEL: CPT

## 2025-03-30 RX ORDER — LEVOTHYROXINE SODIUM 50 UG/1
50 TABLET ORAL
Status: DISCONTINUED | OUTPATIENT
Start: 2025-03-31 | End: 2025-03-31 | Stop reason: HOSPADM

## 2025-03-30 RX ORDER — MAGNESIUM SULFATE HEPTAHYDRATE 40 MG/ML
2 INJECTION, SOLUTION INTRAVENOUS ONCE
Status: COMPLETED | OUTPATIENT
Start: 2025-03-30 | End: 2025-03-30

## 2025-03-30 RX ADMIN — MAGNESIUM SULFATE HEPTAHYDRATE 2 G: 40 INJECTION, SOLUTION INTRAVENOUS at 08:03

## 2025-03-30 RX ADMIN — ENOXAPARIN SODIUM 40 MG: 40 INJECTION SUBCUTANEOUS at 04:03

## 2025-03-30 RX ADMIN — GABAPENTIN 300 MG: 300 CAPSULE ORAL at 11:03

## 2025-03-30 RX ADMIN — LIDOCAINE 2 PATCH: 50 PATCH CUTANEOUS at 08:03

## 2025-03-30 RX ADMIN — LEVOTHYROXINE SODIUM 50 MCG: 20 INJECTION, SOLUTION INTRAVENOUS at 05:03

## 2025-03-30 NOTE — PLAN OF CARE
[facility]  Initial Discharge Assessment       Primary Care Provider: Darci Guthrie MD    Admission Diagnosis: SBO (small bowel obstruction) [K56.609]  Chest pain [R07.9]    Admission Date: 3/28/2025  Expected Discharge Date:     Transition of Care Barriers: None    Payor: MEDICARE / Plan: MEDICARE PART A & B / Product Type: Government /     Extended Emergency Contact Information  Primary Emergency Contact: AngiDanielle   [place]  Mobile Phone: [phone]  Relation: Daughter  Secondary Emergency Contact: Kimberly Horner  Address: [address] [address] [facility]  Home Phone: [phone]  Relation: Daughter    Discharge Plan A: Home, Home Health  Discharge Plan B: Home      [id number] - [place], [place] - [address] AT [facility]  [address]  [facility] [address]  Phone: [phone] Fax: [phone]    [facility] - [facility] [address]  [address]  [facility] [address]  [facility] [address]  Phone: [phone] Fax: [phone]    [id number] - [address] [facility] & [address]  [facility] [address]  Phone: [phone] Fax: [phone]      Initial Assessment (most recent)       Adult Discharge Assessment - 03/30/25 1145          Discharge Assessment    Assessment Type Discharge Planning Assessment     Confirmed/corrected address, phone number and insurance Yes     Confirmed Demographics Correct on Facesheet     Source of Information patient     If unable to respond/provide information was family/caregiver contacted? --   Daughter- Kimberly Gunderson- [phone], Daughter- Danielle Whitley- [phone]    Communicated LOI with patient/caregiver Date not available/Unable to determine     Reason For Admission Partial small bowel obstruction     People in Home alone   pt has one sitter daily     Facility Arrived From: Home     Do you expect to return to your current living situation? Yes     Do you have help at home or someone to help you manage your care at home? Yes     Who are your caregiver(s) and their phone number(s)? Herber- Kimberly Gunderson- [phone], Sergio Whitley- [phone]- and sitters daily     Prior to hospitilization cognitive status: Alert/Oriented     Current cognitive status: Alert/Oriented     Walking or Climbing Stairs Difficulty yes     Walking or Climbing Stairs ambulation difficulty, requires equipment;transferring difficulty, requires equipment     Mobility Management cane and walker     Dressing/Bathing Difficulty yes     Dressing/Bathing bathing difficulty, assistance 1 person     Dressing/Bathing Management shower chair     Do you have any problems with: --   Daughter- Kimberly Gunderson- [phone], DaughterBennie Whitley- [phone]    Home Accessibility other (see comments)   elevators    Home Layout Able to live on 1st floor     Equipment Currently Used at Home cane, straight;walker, standard;rollator;grab bar;shower chair     Readmission within 30 days? No     Patient currently being followed by outpatient case management? No     Do you currently have service(s) that help you manage your care at home? Yes     Name and Contact number of agency [facility] / Sitters daily     Is the pt/caregiver preference to resume services with current agency Yes     Do you take prescription medications? Yes     Do you have prescription coverage? Yes     Coverage MEDICARE - MEDICARE PART A & B -/BCBS     Do you have any problems affording any of your prescribed medications? No     Is the patient taking medications as prescribed? yes     Who is going to help you get home at discharge? Herber- Kimberly Gunderson- [phone], Sergio Whitley- [phone]     How do you get to doctors appointments? family or friend will provide     Are you on dialysis?  No     Do you take coumadin? No     Discharge Plan A Home;Home Health     Discharge Plan B Home     DME Needed Upon Discharge  none     Discharge Plan discussed with: Patient     Transition of Care Barriers None        Social Isolation    How often do you feel lonely or isolated from those around you?  Never                   SW spoke with patient in 1008 for Discharge Planning Assessment. Per patient, Pt lives alone and has daily sitters in a single family home on a slab foundation with zero steps to porch and point of entry.  Patient was dependent with ADLS and DID use DME or in-home assistive equipment. Pt is not on dialysis  or coumadin,  takes medications as prescribed / keeps refilled / has resources for all daily and prescriptive needs. Preferred pharmacy is [facility] - Agreeable to bedside delivery. Will have help from Daughter- Kimberly Gunderson- [phone], Daughter- Danielle Whitley- [phone]   and other immediate family upon discharge - spouse to provide transportation home.  All questions addressed. Unit and SW direct numbers provided. Will continue to follow for course of hospitalization . Upon d/c pat will resume with [facility].    Discharge Plan A and Plan B have been determined by review of patient's clinical status, future medical and therapeutic needs, and coverage/benefits for post-acute care in coordination with multidisciplinary team members.           Samantha Aldana, SW  - [facility]     .

## 2025-03-30 NOTE — HOSPITAL COURSE
Admitted for partial SBO. General surgery following. NGT in place. Concerns for elevated ALFT, currently down trending. NGT tube removed on 03/30 per Gen surg recommendations. Has passed BM x4 for the past 24 hrs. Tolerating solid diet.Had some concerns for ankle pain that was responsive to volatren cream, will prescribed at time of discharge.  Stable to discharge home. Educated about the importance of following up with PCP and specialist. Instructed to come back to the ED in the case of worsening symptoms, intractable nausea, vomiting, abdominal pain or distension,  or if deemed necessary.  Plan discussed with patient who verbalized understanding and agreed to it.

## 2025-03-30 NOTE — PROGRESS NOTES
[facility] - [facility]  General Surgery  Progress Note    Subjective:     History of Present Illness:  Anayeli Judd is an 80yoF with a past medical history significant for diabetes, reflux and an extensive intra-abdominal surgical history and a history of recurrent partial small bowel obstructions. She presents to the emergency room with complaints of nausea, abdominal pain, and PO aversion. She states that she has periumbilical abdominal discomfort. She denies any episodes of emesis. Her last bowel movement was yesterday. She endorses flatus today. She is hemodynamically stable in the emergency department. Her labs are largely unremarkable. Her CT scan demonstrates dilated stomach and proximal small intestine. There is no transition point appreciated. General surgery consulted for evaluation.     At the time of my exam, the patient is resting comfortably in bed. Her abdominal exam is benign. I discussed her imaging findings and the plan of care.     Post-Op Info:  * No surgery found *         Interval History: Patient seen and examined. Endorsing need to have bowel movement this morning on rounds. NG tube with bland output    Medications:  Continuous Infusions:  Scheduled Meds:   enoxparin  40 mg Subcutaneous Q24H (prophylaxis, 1700)    levothyroxine  50 mcg Intravenous Before breakfast    LIDOcaine  2 patch Transdermal Daily    magnesium sulfate 2 g IVPB  2 g Intravenous Once     PRN Meds:  Current Facility-Administered Medications:     dextrose 50%, 12.5 g, Intravenous, PRN    dextrose 50%, 25 g, Intravenous, PRN    gabapentin, 300 mg, Oral, TID PRN    glucagon (human recombinant), 1 mg, Intramuscular, PRN    glucose, 16 g, Oral, PRN    glucose, 24 g, Oral, PRN    insulin aspart U-100, 0-5 Units, Subcutaneous, Q6H PRN    naloxone, 0.02 mg, Intravenous, PRN    ondansetron, 4 mg, Intravenous, Q6H PRN    sodium chloride 0.9%, 10 mL, Intravenous, Q12H PRN     Review of patient's allergies indicates:   Allergen Reactions     Codeine Itching and Nausea And Vomiting    Dilaudid [hydromorphone (bulk)] Other (See Comments)     Oversedating, head burning. Pt prefers to avoid.       Percocet [oxycodone-acetaminophen] Itching    Sulfa (sulfonamide antibiotics) Itching and Nausea And Vomiting    Dilaudid [hydromorphone]     Latex, natural rubber Rash    Opioids - morphine analogues Rash     Objective:     Vital Signs (Most Recent):  Temp: 97.5 °F (36.4 °C) (03/30/25 0746)  Pulse: 72 (03/30/25 0746)  Resp: 16 (03/30/25 0746)  BP: (!) 141/65 (03/30/25 0746)  SpO2: 96 % (03/30/25 0746) Vital Signs (24h Range):  Temp:  [97.4 °F (36.3 °C)-98.2 °F (36.8 °C)] 97.5 °F (36.4 °C)  Pulse:  [65-74] 72  Resp:  [16-18] 16  SpO2:  [93 %-96 %] 96 %  BP: (108-141)/(51-69) 141/65     Weight: 58.1 kg (128 lb)  Body mass index is 23.41 kg/m².    Intake/Output - Last 3 Shifts         03/28 0700  03/29 0659 03/29 0700 03/30 0659 03/30 0700 03/31 0659    P.O.  40     I.V. (mL/kg)  961.3 (16.5)     IV Piggyback 1000      Total Intake(mL/kg) 1000 (17.2) 1001.3 (17.2)     Urine (mL/kg/hr)  800 (0.6)     Drains 300 250     Total Output 300 1050     Net +700 -48.7            Urine Occurrence  2 x              Physical Exam  Vitals and nursing note reviewed.   Constitutional:       General: She is not in acute distress.     Appearance: Normal appearance.   HENT:      Nose: Nose normal.      Comments: NG tube with bland output  Cardiovascular:      Rate and Rhythm: Normal rate and regular rhythm.   Pulmonary:      Effort: Pulmonary effort is normal. No respiratory distress.   Abdominal:      Comments: Well healed surgical scar  Soft, non-tender, non-distended   Musculoskeletal:         General: Normal range of motion.   Skin:     General: Skin is warm and dry.   Neurological:      General: No focal deficit present.      Mental Status: She is alert.          Significant Labs:  I have reviewed all pertinent lab results within the past 24 hours.  CBC:   Recent Labs   Lab  03/30/25  0444   WBC 4.05   RBC 3.00*   HGB 10.3*   HCT 31.0*   PLT 61*   *   MCH 34.3   MCHC 33.2     CMP:   Recent Labs   Lab 03/30/25  0444   CALCIUM 9.1   ALBUMIN 3.1*      K 4.0   CO2 21*      BUN 9   CREATININE 0.7   ALKPHOS 93   ALT 62*   AST 73*   BILITOT 1.4*       Significant Diagnostics:  I have reviewed all pertinent imaging results/findings within the past 24 hours.  Assessment/Plan:     Generalized abdominal pain  Anayeli Judd is an 80yoF with a significant past intra-abdominal surgical history who presents to the ED with complaints of abdominal pain, nausea, and PO aversion. General surgery consulted for evaluation with concern for an SBO.  CT without obvious transition point.  Patient currently having flatus, denies nausea or vomiting with NG tube to suction. The patient adamantly voices that she would not agree to surgery currently. When posed if it was an operation in order to save her life, she states that 'she would have to think about it.' No acute surgical intervention indicated at this time.    - patient seen and examined  - NG tube with bland output   - if patient has a bowel movement, then recommend removing NG tube and PO challenge   - if patient does not have bowel movement this morning, we will plan for a gastrograffin challenge  - rest of care per medicine team  - please call with questions        Primo Mendes MD  General Surgery  [facility]

## 2025-03-30 NOTE — ASSESSMENT & PLAN NOTE
Anayeli Judd is an 80yoF with a significant past intra-abdominal surgical history who presents to the ED with complaints of abdominal pain, nausea, and PO aversion. General surgery consulted for evaluation with concern for an SBO.  CT without obvious transition point.  Patient currently having flatus, denies nausea or vomiting with NG tube to suction. The patient adamantly voices that she would not agree to surgery currently. When posed if it was an operation in order to save her life, she states that 'she would have to think about it.' No acute surgical intervention indicated at this time.    - patient seen and examined  - NG tube with bland output   - if patient has a bowel movement, then recommend removing NG tube and PO challenge   - if patient does not have bowel movement this morning, we will plan for a gastrograffin challenge  - rest of care per medicine team  - please call with questions

## 2025-03-30 NOTE — ASSESSMENT & PLAN NOTE
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  Associated symptoms include nausea and PO aversion, no vomiting.     CTAP with contrast showed multiple dilated proximal to mid small bowel loops noting relative decompression of the distal small bowel, no convincing transition point, and fluid filled/dilated stomach.    -general surgery consulted, appreciate recs  -maintain NG tube to LIWS for bowel rest/decompression  -remain NPO for now, held oral meds   -Per SRG, if able to pass BM, take remove NGT and attempt PO challenge.   -If not able to pass BM, will consider gastrograffin challenge   -PRN antiemetics and pain control

## 2025-03-30 NOTE — PROGRESS NOTES
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 Patient notes chronic issues of abdominal pain related to her extensive surgical history with current symptom onset being the morning PTA after eating breakfast. The pain initially began in the lower abdominal quadrants, gradually radiated to involve the entire abdomen, and worsened with oral intake of food/water. Associated symptoms include nausea. LBM was also noted to be the morning PTA. Denies fever/chills, vomiting, chest pain, shortness of breath, difficulty urinating, dysuria. Pain currently rated 3/10 after receiving pain medications, previously rated 10/10 upon symptom onset.     Patient remained afebrile and hemodynamically stable while in the ED. Labs largely unremarkable other than stable macrocytic anemia, no leukocytosis or acute electrolyte abnormalities noted. CTAP w contrast showed moderate to large amount of colonic stool burden, surgical changes related to partial SMR with patent anastomoses, multiple dilated proximal to mid small bowel loops noting relative decompression of the distal small bowel, and no convincing transition point or pneumatosis. Given morphine 4 mg IV x2, zofran 4 mg IV with symptomatic relief, as well as, 1L IV fluid bolus and IV  benadryl 12.5 mg for itching. Admitted to hospital medicine for further management.     Overview/Hospital Course:  Admitted for partial SBO. General surgery following. NGT in place. Concerns for elevated ALFT, currently down trending. Unclear etiology.     Interval History: NAEON. Reports able to pass a BM today, per Gen surgery will remove NGT and attempt a PO challenge. There is mild tenderness of the RUQ today, she reports it's not new. Will continue to monitor LFT.     Review of Systems  Objective:     Vital Signs (Most Recent):  Temp: 97.5 °F (36.4 °C) (03/30/25 0746)  Pulse: 72 (03/30/25 0746)  Resp: 16 (03/30/25 0746)  BP: (!) 141/65 (03/30/25 0746)  SpO2: 96 % (03/30/25 0746) Vital Signs (24h Range):  Temp:  [97.4 °F (36.3 °C)-98.2 °F (36.8 °C)] 97.5 °F (36.4 °C)  Pulse:  [65-74] 72  Resp:  [16-18] 16  SpO2:  [93 %-96 %] 96 %  BP: (108-141)/(51-69) 141/65     Weight: 58.1 kg (128 lb)  Body mass index is 23.41 kg/m².    Intake/Output Summary (Last 24 hours) at 3/30/2025 0849  Last data filed at 3/30/2025 0516  Gross per 24 hour   Intake 981.31 ml   Output 1050 ml   Net -68.69 ml         Physical Exam  Vitals and nursing note reviewed.   Constitutional:       General: She is not in acute distress.     Appearance: She is not ill-appearing, toxic-appearing or diaphoretic.   HENT:      Head: Normocephalic and atraumatic.      Mouth/Throat:      Mouth: Mucous membranes are moist.      Pharynx: Oropharynx is clear. No oropharyngeal exudate.   Eyes:      General: No scleral icterus.     Extraocular Movements: Extraocular movements intact.      Conjunctiva/sclera: Conjunctivae normal.   Cardiovascular:      Rate and Rhythm: Normal rate and regular rhythm.      Pulses: Normal pulses.      Heart sounds: Murmur heard.   Pulmonary:      Effort: Pulmonary effort is normal. No respiratory distress.      Breath sounds: Normal breath sounds. No wheezing.   Abdominal:      General: Abdomen is flat. Bowel sounds are normal.  "There is no distension.      Palpations: Abdomen is soft.      Tenderness: There is abdominal tenderness (RUQ). There is no guarding or rebound.   Skin:     General: Skin is warm and dry.      Coloration: Skin is not jaundiced.   Neurological:      Mental Status: She is alert and oriented to person, place, and time.      Cranial Nerves: No dysarthria or facial asymmetry.      Motor: No weakness.               Significant Labs: All pertinent labs within the past 24 hours have been reviewed.  A1C:   Recent Labs   Lab 03/29/25 0537   HGBA1C 5.7*     Bilirubin:   Recent Labs   Lab 03/28/25 1715 03/29/25 0537 03/30/25  0444   BILITOT 0.9 1.5* 1.4*     CBC:   Recent Labs   Lab 03/28/25 1715 03/29/25 0537 03/30/25  0444   WBC 5.52 6.67 4.05   HGB 11.5* 11.2* 10.3*   HCT 35.5* 34.8* 31.0*   PLT 93* 80* 61*     CMP:   Recent Labs   Lab 03/28/25 1715 03/29/25 0537 03/30/25  0444    135* 136   K 4.5 4.5 4.0    106 105   CO2 20* 20* 21*   BUN 11 10 9   CREATININE 1.0 1.1 0.7   CALCIUM 10.2 9.7 9.1   ALBUMIN 4.0 3.4* 3.1*   BILITOT 0.9 1.5* 1.4*   ALKPHOS 74 94 93   AST 27 184* 73*   ALT 21 75* 62*   ANIONGAP 9 9 10     Lipase:   Recent Labs   Lab 03/28/25  1715   LIPASE 23     Lipid Panel: No results for input(s): "CHOL", "HDL", "LDLCALC", "TRIG", "CHOLHDL" in the last 48 hours.  Magnesium:   Recent Labs   Lab 03/29/25 0537 03/30/25  0444   MG 1.4* 1.3*     Urine Culture: No results for input(s): "LABURIN" in the last 48 hours.  Urine Studies: No results for input(s): "COLORU", "APPEARANCEUA", "PHUR", "SPECGRAV", "PROTEINUA", "GLUCUA", "KETONESU", "BILIRUBINUA", "OCCULTUA", "NITRITE", "UROBILINOGEN", "LEUKOCYTESUR", "RBCUA", "WBCUA", "BACTERIA", "SQUAMEPITHEL", "HYALINECASTS" in the last 48 hours.    Invalid input(s): "WRIGHTSUR"    Significant Imaging: I have reviewed all pertinent imaging results/findings within the past 24 hours.      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  Associated symptoms include nausea and PO aversion, no vomiting.     CTAP with contrast showed multiple dilated proximal to mid small bowel loops noting relative decompression of the distal small bowel, no convincing transition point, and fluid filled/dilated stomach.    -general surgery consulted, appreciate recs  -maintain NG tube to LIWS for bowel rest/decompression  -remain NPO for now, held oral meds   -Per SRG, if able to pass BM, take remove NGT and attempt PO challenge.   -If not able to pass BM, will consider gastrograffin challenge   -PRN antiemetics and pain control  Acquired hypothyroidism  - levothyroxine 75 mcg daily for now IV alternative    GERD (gastroesophageal reflux disease)  -hold home PPI unable able to tolerate PO    Type 2 diabetes mellitus without complication, without long-term current use of insulin  Patient's FSGs are controlled on current medication regimen.  Last A1c reviewed-   Lab Results   Component Value Date    HGBA1C 5.7 (H) 03/29/2025     Most recent fingerstick glucose reviewed-   Recent Labs   Lab 03/29/25  1223 03/29/25  1747 03/29/25  2350 03/30/25  0424   POCTGLUCOSE 104 102 95 101     Current correctional scale  Low  Maintain anti-hyperglycemic dose as follows-   Antihyperglycemics (From admission, onward)      Start     Stop Route Frequency Ordered    03/29/25 0256  insulin aspart U-100 pen 0-5 Units         -- SubQ Every 6 hours PRN 03/29/25 0156          -hold oral hypoglycemics while patient is in the hospital.  - hemoglobin A1c 5.7  -accuchecks Q6H while NPO    Generalized abdominal pain  -see partial small bowel obstruction    VTE Risk  Mitigation (From admission, onward)           Ordered     enoxaparin injection 40 mg  Every 24 hours         03/28/25 2109     IP VTE HIGH RISK PATIENT  Once         03/28/25 2109     Place sequential compression device  Until discontinued         03/28/25 2109                    Discharge Planning   LOI:      Code Status: Full Code   Medical Readiness for Discharge Date:                            Mirian Scherer MD  Department of Hospital Medicine   [facility]

## 2025-03-30 NOTE — SUBJECTIVE & OBJECTIVE
Interval History: NAEON. Reports able to pass a BM today, per Gen surgery will remove NGT and attempt a PO challenge. There is mild tenderness of the RUQ today, she reports it's not new. Will continue to monitor LFT.     Review of Systems  Objective:     Vital Signs (Most Recent):  Temp: 97.5 °F (36.4 °C) (03/30/25 0746)  Pulse: 72 (03/30/25 0746)  Resp: 16 (03/30/25 0746)  BP: (!) 141/65 (03/30/25 0746)  SpO2: 96 % (03/30/25 0746) Vital Signs (24h Range):  Temp:  [97.4 °F (36.3 °C)-98.2 °F (36.8 °C)] 97.5 °F (36.4 °C)  Pulse:  [65-74] 72  Resp:  [16-18] 16  SpO2:  [93 %-96 %] 96 %  BP: (108-141)/(51-69) 141/65     Weight: 58.1 kg (128 lb)  Body mass index is 23.41 kg/m².    Intake/Output Summary (Last 24 hours) at 3/30/2025 0849  Last data filed at 3/30/2025 0516  Gross per 24 hour   Intake 981.31 ml   Output 1050 ml   Net -68.69 ml         Physical Exam  Vitals and nursing note reviewed.   Constitutional:       General: She is not in acute distress.     Appearance: She is not ill-appearing, toxic-appearing or diaphoretic.   HENT:      Head: Normocephalic and atraumatic.      Mouth/Throat:      Mouth: Mucous membranes are moist.      Pharynx: Oropharynx is clear. No oropharyngeal exudate.   Eyes:      General: No scleral icterus.     Extraocular Movements: Extraocular movements intact.      Conjunctiva/sclera: Conjunctivae normal.   Cardiovascular:      Rate and Rhythm: Normal rate and regular rhythm.      Pulses: Normal pulses.      Heart sounds: Murmur heard.   Pulmonary:      Effort: Pulmonary effort is normal. No respiratory distress.      Breath sounds: Normal breath sounds. No wheezing.   Abdominal:      General: Abdomen is flat. Bowel sounds are normal. There is no distension.      Palpations: Abdomen is soft.      Tenderness: There is abdominal tenderness (RUQ). There is no guarding or rebound.   Skin:     General: Skin is warm and dry.      Coloration: Skin is not jaundiced.   Neurological:      Mental  "Status: She is alert and oriented to person, place, and time.      Cranial Nerves: No dysarthria or facial asymmetry.      Motor: No weakness.               Significant Labs: All pertinent labs within the past 24 hours have been reviewed.  A1C:   Recent Labs   Lab 03/29/25  0537   HGBA1C 5.7*     Bilirubin:   Recent Labs   Lab 03/28/25 1715 03/29/25  0537 03/30/25  0444   BILITOT 0.9 1.5* 1.4*     CBC:   Recent Labs   Lab 03/28/25 1715 03/29/25  0537 03/30/25  0444   WBC 5.52 6.67 4.05   HGB 11.5* 11.2* 10.3*   HCT 35.5* 34.8* 31.0*   PLT 93* 80* 61*     CMP:   Recent Labs   Lab 03/28/25 1715 03/29/25  0537 03/30/25  0444    135* 136   K 4.5 4.5 4.0    106 105   CO2 20* 20* 21*   BUN 11 10 9   CREATININE 1.0 1.1 0.7   CALCIUM 10.2 9.7 9.1   ALBUMIN 4.0 3.4* 3.1*   BILITOT 0.9 1.5* 1.4*   ALKPHOS 74 94 93   AST 27 184* 73*   ALT 21 75* 62*   ANIONGAP 9 9 10     Lipase:   Recent Labs   Lab 03/28/25  1715   LIPASE 23     Lipid Panel: No results for input(s): "CHOL", "HDL", "LDLCALC", "TRIG", "CHOLHDL" in the last 48 hours.  Magnesium:   Recent Labs   Lab 03/29/25 0537 03/30/25  0444   MG 1.4* 1.3*     Urine Culture: No results for input(s): "LABURIN" in the last 48 hours.  Urine Studies: No results for input(s): "COLORU", "APPEARANCEUA", "PHUR", "SPECGRAV", "PROTEINUA", "GLUCUA", "KETONESU", "BILIRUBINUA", "OCCULTUA", "NITRITE", "UROBILINOGEN", "LEUKOCYTESUR", "RBCUA", "WBCUA", "BACTERIA", "SQUAMEPITHEL", "HYALINECASTS" in the last 48 hours.    Invalid input(s): "JONAS"    Significant Imaging: I have reviewed all pertinent imaging results/findings within the past 24 hours.  "

## 2025-03-30 NOTE — SUBJECTIVE & OBJECTIVE
Interval History: Patient seen and examined. Endorsing need to have bowel movement this morning on rounds. NG tube with bland output    Medications:  Continuous Infusions:  Scheduled Meds:   enoxparin  40 mg Subcutaneous Q24H (prophylaxis, 1700)    levothyroxine  50 mcg Intravenous Before breakfast    LIDOcaine  2 patch Transdermal Daily    magnesium sulfate 2 g IVPB  2 g Intravenous Once     PRN Meds:  Current Facility-Administered Medications:     dextrose 50%, 12.5 g, Intravenous, PRN    dextrose 50%, 25 g, Intravenous, PRN    gabapentin, 300 mg, Oral, TID PRN    glucagon (human recombinant), 1 mg, Intramuscular, PRN    glucose, 16 g, Oral, PRN    glucose, 24 g, Oral, PRN    insulin aspart U-100, 0-5 Units, Subcutaneous, Q6H PRN    naloxone, 0.02 mg, Intravenous, PRN    ondansetron, 4 mg, Intravenous, Q6H PRN    sodium chloride 0.9%, 10 mL, Intravenous, Q12H PRN     Review of patient's allergies indicates:   Allergen Reactions    Codeine Itching and Nausea And Vomiting    Dilaudid [hydromorphone (bulk)] Other (See Comments)     Oversedating, head burning. Pt prefers to avoid.       Percocet [oxycodone-acetaminophen] Itching    Sulfa (sulfonamide antibiotics) Itching and Nausea And Vomiting    Dilaudid [hydromorphone]     Latex, natural rubber Rash    Opioids - morphine analogues Rash     Objective:     Vital Signs (Most Recent):  Temp: 97.5 °F (36.4 °C) (03/30/25 0746)  Pulse: 72 (03/30/25 0746)  Resp: 16 (03/30/25 0746)  BP: (!) 141/65 (03/30/25 0746)  SpO2: 96 % (03/30/25 0746) Vital Signs (24h Range):  Temp:  [97.4 °F (36.3 °C)-98.2 °F (36.8 °C)] 97.5 °F (36.4 °C)  Pulse:  [65-74] 72  Resp:  [16-18] 16  SpO2:  [93 %-96 %] 96 %  BP: (108-141)/(51-69) 141/65     Weight: 58.1 kg (128 lb)  Body mass index is 23.41 kg/m².    Intake/Output - Last 3 Shifts         03/28 0700 03/29 0659 03/29 0700 03/30 0659 03/30 0700 03/31 0659    P.O.  40     I.V. (mL/kg)  961.3 (16.5)     IV Piggyback 1000      Total  Intake(mL/kg) 1000 (17.2) 1001.3 (17.2)     Urine (mL/kg/hr)  800 (0.6)     Drains 300 250     Total Output 300 1050     Net +700 -48.7            Urine Occurrence  2 x              Physical Exam  Vitals and nursing note reviewed.   Constitutional:       General: She is not in acute distress.     Appearance: Normal appearance.   HENT:      Nose: Nose normal.      Comments: NG tube with bland output  Cardiovascular:      Rate and Rhythm: Normal rate and regular rhythm.   Pulmonary:      Effort: Pulmonary effort is normal. No respiratory distress.   Abdominal:      Comments: Well healed surgical scar  Soft, non-tender, non-distended   Musculoskeletal:         General: Normal range of motion.   Skin:     General: Skin is warm and dry.   Neurological:      General: No focal deficit present.      Mental Status: She is alert.          Significant Labs:  I have reviewed all pertinent lab results within the past 24 hours.  CBC:   Recent Labs   Lab 03/30/25  0444   WBC 4.05   RBC 3.00*   HGB 10.3*   HCT 31.0*   PLT 61*   *   MCH 34.3   MCHC 33.2     CMP:   Recent Labs   Lab 03/30/25  0444   CALCIUM 9.1   ALBUMIN 3.1*      K 4.0   CO2 21*      BUN 9   CREATININE 0.7   ALKPHOS 93   ALT 62*   AST 73*   BILITOT 1.4*       Significant Diagnostics:  I have reviewed all pertinent imaging results/findings within the past 24 hours.

## 2025-03-30 NOTE — CARE UPDATE
General Surgery Update:     Anayeli Judd has had two bowel movements this morning. We plan to remove her NG tube and advance her diet. Please re-engage general surgery if needed.       Primo Mendes MD   General Surgery, PGY5  [phone]

## 2025-03-30 NOTE — ASSESSMENT & PLAN NOTE
Patient's FSGs are controlled on current medication regimen.  Last A1c reviewed-   Lab Results   Component Value Date    HGBA1C 5.7 (H) 03/29/2025     Most recent fingerstick glucose reviewed-   Recent Labs   Lab 03/29/25  1223 03/29/25  1747 03/29/25  2350 03/30/25  0424   POCTGLUCOSE 104 102 95 101     Current correctional scale  Low  Maintain anti-hyperglycemic dose as follows-   Antihyperglycemics (From admission, onward)      Start     Stop Route Frequency Ordered    03/29/25 0256  insulin aspart U-100 pen 0-5 Units         -- SubQ Every 6 hours PRN 03/29/25 0156          -hold oral hypoglycemics while patient is in the hospital.  - hemoglobin A1c 5.7  -accuchecks Q6H while NPO

## 2025-03-30 NOTE — NURSING
"[facility] Plan of Care Note    Dx:   SBO (small bowel obstruction) [K56.609]  Chest pain [R07.9]    Shift Events: N/A    Goals of Care: Progressing towards goals    Neuro: AAOx4    Vital Signs: /69   Pulse 74   Temp 98 °F (36.7 °C) (Oral)   Resp 18   Ht 5' 2" (1.575 m)   Wt 58.1 kg (128 lb)   LMP  (LMP Unknown)   SpO2 (!) 94%   BMI 23.41 kg/m²     Respiratory: RA    Diet: Diet NPO Except for: Ice Chips, Sips with Medication      Is patient tolerating current diet? Yes    GTTS: N/A    Urine Output/Bowel Movement:   I/O this shift:  In: -   Out: 400 [Urine:200; Drains:200]  Last Bowel Movement: 03/27/25      Drains/Tubes/Tube Feeds (include total output/shift):   I/O this shift:  In: -   Out: 400 [Urine:200; Drains:200]      Lines: PIV x1      Accuchecks:Q6    Skin: Intact    Fall Risk Score: 9    Activity level? Standby assist     Any scheduled procedures? N/a    Any safety concerns? N/a    Other: n/a    "

## 2025-03-31 VITALS
SYSTOLIC BLOOD PRESSURE: 117 MMHG | WEIGHT: 128 LBS | DIASTOLIC BLOOD PRESSURE: 57 MMHG | HEART RATE: 70 BPM | HEIGHT: 62 IN | RESPIRATION RATE: 18 BRPM | OXYGEN SATURATION: 98 % | BODY MASS INDEX: 23.55 KG/M2 | TEMPERATURE: 98 F

## 2025-03-31 PROBLEM — E88.09 HYPOALBUMINEMIA: Status: ACTIVE | Noted: 2025-03-31

## 2025-03-31 PROBLEM — R74.8 ELEVATED LIVER ENZYMES: Status: ACTIVE | Noted: 2025-03-31

## 2025-03-31 LAB
ABSOLUTE EOSINOPHIL (OHS): 0.11 K/UL
ABSOLUTE MONOCYTE (OHS): 0.42 K/UL (ref 0.3–1)
ABSOLUTE NEUTROPHIL COUNT (OHS): 1.97 K/UL (ref 1.8–7.7)
ALBUMIN SERPL BCP-MCNC: 3.1 G/DL (ref 3.5–5.2)
ALP SERPL-CCNC: 95 UNIT/L (ref 40–150)
ALT SERPL W/O P-5'-P-CCNC: 47 UNIT/L (ref 10–44)
ANION GAP (OHS): 10 MMOL/L (ref 8–16)
AST SERPL-CCNC: 45 UNIT/L (ref 11–45)
BASOPHILS # BLD AUTO: 0.02 K/UL
BASOPHILS NFR BLD AUTO: 0.5 %
BILIRUB SERPL-MCNC: 1.1 MG/DL (ref 0.1–1)
BUN SERPL-MCNC: 7 MG/DL (ref 8–23)
CALCIUM SERPL-MCNC: 8.9 MG/DL (ref 8.7–10.5)
CHLORIDE SERPL-SCNC: 106 MMOL/L (ref 95–110)
CO2 SERPL-SCNC: 23 MMOL/L (ref 23–29)
CREAT SERPL-MCNC: 0.8 MG/DL (ref 0.5–1.4)
CRP SERPL-MCNC: 10.2 MG/L
ERYTHROCYTE [DISTWIDTH] IN BLOOD BY AUTOMATED COUNT: 13.2 % (ref 11.5–14.5)
ERYTHROCYTE [SEDIMENTATION RATE] IN BLOOD BY PHOTOMETRIC METHOD: 25 MM/HR
GFR SERPLBLD CREATININE-BSD FMLA CKD-EPI: >60 ML/MIN/1.73/M2
GLUCOSE SERPL-MCNC: 129 MG/DL (ref 70–110)
HCT VFR BLD AUTO: 29.9 % (ref 37–48.5)
HGB BLD-MCNC: 10.1 GM/DL (ref 12–16)
IMM GRANULOCYTES # BLD AUTO: 0.01 K/UL (ref 0–0.04)
IMM GRANULOCYTES NFR BLD AUTO: 0.3 % (ref 0–0.5)
LYMPHOCYTES # BLD AUTO: 1.11 K/UL (ref 1–4.8)
MAGNESIUM SERPL-MCNC: 1.5 MG/DL (ref 1.6–2.6)
MCH RBC QN AUTO: 34.7 PG (ref 27–50)
MCHC RBC AUTO-ENTMCNC: 33.8 G/DL (ref 32–36)
MCV RBC AUTO: 103 FL (ref 82–98)
NUCLEATED RBC (/100WBC) (OHS): 0 /100 WBC
PHOSPHATE SERPL-MCNC: 3.5 MG/DL (ref 2.7–4.5)
PLATELET # BLD AUTO: 63 K/UL (ref 150–450)
PMV BLD AUTO: 11.1 FL (ref 9.2–12.9)
POCT GLUCOSE: 130 MG/DL (ref 70–110)
POCT GLUCOSE: 133 MG/DL (ref 70–110)
POCT GLUCOSE: 254 MG/DL (ref 70–110)
POTASSIUM SERPL-SCNC: 3.7 MMOL/L (ref 3.5–5.1)
PROT SERPL-MCNC: 6.3 GM/DL (ref 6–8.4)
RBC # BLD AUTO: 2.91 M/UL (ref 4–5.4)
RELATIVE EOSINOPHIL (OHS): 3 %
RELATIVE LYMPHOCYTE (OHS): 30.5 % (ref 18–48)
RELATIVE MONOCYTE (OHS): 11.5 % (ref 4–15)
RELATIVE NEUTROPHIL (OHS): 54.2 % (ref 38–73)
SODIUM SERPL-SCNC: 139 MMOL/L (ref 136–145)
URATE SERPL-MCNC: 4 MG/DL (ref 2.4–5.7)
WBC # BLD AUTO: 3.64 K/UL (ref 3.9–12.7)

## 2025-03-31 PROCEDURE — 84100 ASSAY OF PHOSPHORUS: CPT

## 2025-03-31 PROCEDURE — 84550 ASSAY OF BLOOD/URIC ACID: CPT

## 2025-03-31 PROCEDURE — 25000003 PHARM REV CODE 250

## 2025-03-31 PROCEDURE — 63600175 PHARM REV CODE 636 W HCPCS

## 2025-03-31 PROCEDURE — 83735 ASSAY OF MAGNESIUM: CPT

## 2025-03-31 PROCEDURE — 25000003 PHARM REV CODE 250: Performed by: INTERNAL MEDICINE

## 2025-03-31 PROCEDURE — 85652 RBC SED RATE AUTOMATED: CPT

## 2025-03-31 PROCEDURE — 85025 COMPLETE CBC W/AUTO DIFF WBC: CPT

## 2025-03-31 PROCEDURE — 36415 COLL VENOUS BLD VENIPUNCTURE: CPT

## 2025-03-31 PROCEDURE — 86140 C-REACTIVE PROTEIN: CPT

## 2025-03-31 PROCEDURE — 80053 COMPREHEN METABOLIC PANEL: CPT

## 2025-03-31 RX ORDER — GABAPENTIN 300 MG/1
300 CAPSULE ORAL 3 TIMES DAILY
Status: DISCONTINUED | OUTPATIENT
Start: 2025-03-31 | End: 2025-03-31

## 2025-03-31 RX ORDER — GABAPENTIN 300 MG/1
300 CAPSULE ORAL 3 TIMES DAILY
Status: DISCONTINUED | OUTPATIENT
Start: 2025-03-31 | End: 2025-03-31 | Stop reason: HOSPADM

## 2025-03-31 RX ORDER — DICLOFENAC SODIUM 10 MG/G
2 GEL TOPICAL 2 TIMES DAILY
Qty: 100 G | Refills: 3 | Status: SHIPPED | OUTPATIENT
Start: 2025-03-31

## 2025-03-31 RX ORDER — DICLOFENAC SODIUM 10 MG/G
2 GEL TOPICAL DAILY
Status: DISCONTINUED | OUTPATIENT
Start: 2025-03-31 | End: 2025-03-31 | Stop reason: HOSPADM

## 2025-03-31 RX ORDER — DONEPEZIL HYDROCHLORIDE 5 MG/1
10 TABLET, FILM COATED ORAL NIGHTLY
Status: DISCONTINUED | OUTPATIENT
Start: 2025-03-31 | End: 2025-03-31 | Stop reason: HOSPADM

## 2025-03-31 RX ORDER — MAGNESIUM SULFATE HEPTAHYDRATE 40 MG/ML
2 INJECTION, SOLUTION INTRAVENOUS ONCE
Status: COMPLETED | OUTPATIENT
Start: 2025-03-31 | End: 2025-03-31

## 2025-03-31 RX ADMIN — DICLOFENAC SODIUM 2 G: 10 GEL TOPICAL at 01:03

## 2025-03-31 RX ADMIN — MAGNESIUM SULFATE HEPTAHYDRATE 2 G: 40 INJECTION, SOLUTION INTRAVENOUS at 11:03

## 2025-03-31 RX ADMIN — GABAPENTIN 300 MG: 300 CAPSULE ORAL at 01:03

## 2025-03-31 RX ADMIN — INSULIN ASPART 3 UNITS: 100 INJECTION, SOLUTION INTRAVENOUS; SUBCUTANEOUS at 01:03

## 2025-03-31 RX ADMIN — GABAPENTIN 300 MG: 300 CAPSULE ORAL at 09:03

## 2025-03-31 RX ADMIN — LEVOTHYROXINE SODIUM 50 MCG: 0.05 TABLET ORAL at 05:03

## 2025-03-31 RX ADMIN — LIDOCAINE 2 PATCH: 50 PATCH CUTANEOUS at 08:03

## 2025-03-31 NOTE — MED STUDENT ASSESSMENT & PLAN
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  Associated symptoms include nausea and PO aversion, no vomiting.     CTAP with contrast showed multiple dilated proximal to mid small bowel loops noting relative decompression of the distal small bowel, no convincing transition point, and fluid filled/dilated stomach.    Plan  -general surgery s/o   - transitioned from liquids to solid food as tolerated  -PRN antiemetics and pain control      2. Type 2 diabetes mellitus without complication, without long-term current use of insulin  Patient's FSGs are controlled on current medication regimen.  Last A1c reviewed-   Lab Results   Component Value Date    HGBA1C 5.7 (H) 03/29/2025     Most recent fingerstick glucose reviewed-   Recent Labs   Lab 03/29/25  1223 03/29/25  1747 03/29/25  2350 03/30/25  0424   POCTGLUCOSE 104 102 95 101     Current correctional scale  Low  Maintain anti-hyperglycemic dose as follows-   Antihyperglycemics (From admission, onward)      Start     Stop Route Frequency Ordered    03/29/25 0256  insulin aspart U-100 pen 0-5 Units         -- SubQ Every 6 hours PRN 03/29/25 0156          Plan  -hold oral hypoglycemics while patient is in the hospital.  - hemoglobin A1c 5.7  -accuchecks Q6H while NPO      3. GERD (gastroesophageal reflux disease)  Plan  -resume home PPI     4. Acquired hypothyroidism  Plan  -  resume levothyroxine 75 mcg daily     5. L Ankle swelling/pain   New L ankle joint pain/swelling that has persisted longer (24hrs) than similar to previous episodes ( normally resolve w/o intervention in couple hours) in various upper and lower extremity joints. Pt  reports osteoarthritis and first degree family hx rheumatoid arthritis in.    Plan  - ordered x-ray L ankle    - ordered ESR/CRP, uric acid   - sx home dose gabapentin    -  prn topical diclofenac ordered

## 2025-03-31 NOTE — ASSESSMENT & PLAN NOTE
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  Associated symptoms include nausea and PO aversion, no vomiting.     CTAP with contrast showed multiple dilated proximal to mid small bowel loops noting relative decompression of the distal small bowel, no convincing transition point, and fluid filled/dilated stomach.    -general surgery consulted, appreciate recs  -maintain NG tube to LIWS for bowel rest/decompression  -Tolerating Diet   -Per SRG, if able to pass BM, take remove NGT and attempt PO challenge.      -PRN antiemetics and pain control

## 2025-03-31 NOTE — DISCHARGE SUMMARY
[facility]  Discharge Summary      Patient Name: Anayeli Judd  MRN: [id number]  KAYLENE: [id number]  Patient Class: IP- Inpatient  Admission Date: 3/28/2025  Hospital Length of Stay: 3 days  Discharge Date and Time: 03/31/2025 3:52 PM  Attending Physician: Angie Trevino MD   Discharging Provider: Mirian Scherer MD  Primary Care Provider: Darci Guthrie MD  [facility] Medicine Team: [facility] MED 1 Mirian Scherer MD  Primary Care Team: [facility] 1    HPI:   Anayeli Judd is an 80 year old female with hx of colon cancer s/p transverse colectomy (2011), 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 Patient notes chronic issues of abdominal pain related to her extensive surgical history with current symptom onset being the morning PTA after eating breakfast. The pain initially began in the lower abdominal quadrants, gradually radiated to involve the entire abdomen, and worsened with oral intake of food/water. Associated symptoms include nausea. LBM was also noted to be the morning PTA. Denies fever/chills, vomiting, chest pain, shortness of breath, difficulty urinating, dysuria. Pain currently rated 3/10 after receiving pain medications, previously rated 10/10 upon symptom onset.     Patient remained afebrile and hemodynamically stable while in the ED. Labs largely unremarkable other than stable macrocytic anemia, no leukocytosis or acute electrolyte abnormalities noted. CTAP w contrast showed moderate to large amount of colonic stool burden, surgical changes related to partial SMR with patent anastomoses, multiple dilated proximal to mid small bowel loops noting relative decompression of the distal small bowel, and no  convincing transition point or pneumatosis. Given morphine 4 mg IV x2, zofran 4 mg IV with symptomatic relief, as well as, 1L IV fluid bolus and IV benadryl 12.5 mg for itching. Admitted to hospital medicine for further management.     * No surgery found *      Hospital Course:   Admitted for partial SBO. General surgery following. NGT in place. Concerns for elevated ALFT, currently down trending. NGT tube removed on 03/30 per Gen surg recommendations. Has passed BM x4 for the past 24 hrs. Tolerating solid diet.Had some concerns for ankle pain that was responsive to volatren cream, will prescribed at time of discharge.  Stable to discharge home. Educated about the importance of following up with PCP and specialist. Instructed to come back to the ED in the case of worsening symptoms, intractable nausea, vomiting, abdominal pain or distension,  or if deemed necessary.  Plan discussed with patient who verbalized understanding and agreed to it.       Interval History: NAEON. Reports able to pass a BM today, so fa total of 4x BM in the last 24 hours. Tolerating diet. Some complaints of left ankle pain. Ordered Voltaren gel. Stable to discharge home.     Review of Systems  Objective:     Vital Signs (Most Recent):  Temp: 98.1 °F (36.7 °C) (03/31/25 1537)  Pulse: 70 (03/31/25 1537)  Resp: 18 (03/31/25 1537)  BP: (!) 117/57 (03/31/25 1537)  SpO2: 98 % (03/31/25 1537) Vital Signs (24h Range):  Temp:  [97.5 °F (36.4 °C)-99 °F (37.2 °C)] 98.1 °F (36.7 °C)  Pulse:  [65-78] 70  Resp:  [12-18] 18  SpO2:  [95 %-99 %] 98 %  BP: (111-151)/(54-76) 117/57     Weight: 58.1 kg (128 lb)  Body mass index is 23.41 kg/m².    Intake/Output Summary (Last 24 hours) at 3/31/2025 1545  Last data filed at 3/31/2025 1342  Gross per 24 hour   Intake 589.67 ml   Output --   Net 589.67 ml         Physical Exam  Vitals and nursing note reviewed.   Constitutional:       General: She is not in acute distress.     Appearance: She is not ill-appearing,  "toxic-appearing or diaphoretic.   HENT:      Head: Normocephalic and atraumatic.      Mouth/Throat:      Mouth: Mucous membranes are moist.      Pharynx: Oropharynx is clear. No oropharyngeal exudate.   Eyes:      General: No scleral icterus.     Extraocular Movements: Extraocular movements intact.      Conjunctiva/sclera: Conjunctivae normal.   Cardiovascular:      Rate and Rhythm: Normal rate and regular rhythm.      Pulses: Normal pulses.      Heart sounds: Murmur heard.   Pulmonary:      Effort: Pulmonary effort is normal. No respiratory distress.      Breath sounds: Normal breath sounds. No wheezing.   Abdominal:      General: Abdomen is flat. Bowel sounds are normal. There is no distension.      Palpations: Abdomen is soft.      Tenderness: There is abdominal tenderness (RUQ). There is no guarding or rebound.   Skin:     General: Skin is warm and dry.      Coloration: Skin is not jaundiced.   Neurological:      Mental Status: She is alert and oriented to person, place, and time.      Cranial Nerves: No dysarthria or facial asymmetry.      Motor: No weakness.               Significant Labs: All pertinent labs within the past 24 hours have been reviewed.  A1C:   Recent Labs   Lab 03/29/25  0537   HGBA1C 5.7*     Bilirubin:   Recent Labs   Lab 03/28/25  1715 03/29/25  0537 03/30/25  0444 03/31/25  0640   BILITOT 0.9 1.5* 1.4* 1.1*     CBC:   Recent Labs   Lab 03/30/25  0444 03/31/25  0640   WBC 4.05 3.64*   HGB 10.3* 10.1*   HCT 31.0* 29.9*   PLT 61* 63*     CMP:   Recent Labs   Lab 03/30/25  0444 03/31/25  0640    139   K 4.0 3.7    106   CO2 21* 23   BUN 9 7*   CREATININE 0.7 0.8   CALCIUM 9.1 8.9   ALBUMIN 3.1* 3.1*   BILITOT 1.4* 1.1*   ALKPHOS 93 95   AST 73* 45   ALT 62* 47*   ANIONGAP 10 10     Lipase:   No results for input(s): "LIPASE" in the last 48 hours.    Lipid Panel: No results for input(s): "CHOL", "HDL", "LDLCALC", "TRIG", "CHOLHDL" in the last 48 hours.  Magnesium:   Recent Labs " "  Lab 03/30/25  0444 03/31/25  0640   MG 1.3* 1.5*     Urine Culture: No results for input(s): "LABURIN" in the last 48 hours.  Urine Studies: No results for input(s): "COLORU", "APPEARANCEUA", "PHUR", "SPECGRAV", "PROTEINUA", "GLUCUA", "KETONESU", "BILIRUBINUA", "OCCULTUA", "NITRITE", "UROBILINOGEN", "LEUKOCYTESUR", "RBCUA", "WBCUA", "BACTERIA", "SQUAMEPITHEL", "HYALINECASTS" in the last 48 hours.    Invalid input(s): "WRIGHTSUR"    Significant Imaging: I have reviewed all pertinent imaging results/findings within the past 24 hours.   Goals of Care Treatment Preferences:  Code Status: Full Code    Living Will: Yes              SDOH Screening:  The patient was screened for utility difficulties, food insecurity, transport difficulties, housing insecurity, and interpersonal safety and there were no concerns identified this admission.     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  Associated symptoms include nausea and PO aversion, no vomiting.     CTAP with contrast showed multiple dilated proximal to mid small bowel loops noting relative decompression of the distal small bowel, no convincing transition point, and fluid filled/dilated stomach.    -general surgery consulted, appreciate recs  -maintain NG tube to LIWS for bowel rest/decompression  -Tolerating Diet   -Per SRG, if able to pass BM, take remove NGT and attempt PO challenge.      -PRN antiemetics and pain " control  Acquired hypothyroidism  - levothyroxine 75 mcg daily for now IV alternative    GERD (gastroesophageal reflux disease)  -hold home PPI unable able to tolerate PO    Type 2 diabetes mellitus without complication, without long-term current use of insulin  Patient's FSGs are controlled on current medication regimen.  Last A1c reviewed-   Lab Results   Component Value Date    HGBA1C 5.7 (H) 03/29/2025     Most recent fingerstick glucose reviewed-   Recent Labs   Lab 03/30/25  2355 03/31/25  0625 03/31/25  0629 03/31/25  1219   POCTGLUCOSE 112* 133* 130* 254*     Current correctional scale  Low  Maintain anti-hyperglycemic dose as follows-   Antihyperglycemics (From admission, onward)      Start     Stop Route Frequency Ordered    03/29/25 0256  insulin aspart U-100 pen 0-5 Units         -- SubQ Every 6 hours PRN 03/29/25 0156          -hold oral hypoglycemics while patient is in the hospital.  - hemoglobin A1c 5.7  -accuchecks Q6H while NPO    Generalized abdominal pain  -see partial small bowel obstruction    Hypomagnesemia  Patient has Abnormal Magnesium: hypomagnesemia. Will continue to monitor electrolytes closely. Will replace the affected electrolytes and repeat labs to be done after interventions completed. The patient's magnesium results have been reviewed and are listed below.  Recent Labs   Lab 03/31/25  0640   MG 1.5*      Elevated liver enzymes  -Down trending.     Body mass index (BMI) 23.0-23.9, adult      Hypoalbuminemia      Final Active Diagnoses:    Diagnosis Date Noted POA    PRINCIPAL PROBLEM:  Partial small bowel obstruction [K56.600] 09/01/2014 Yes    Elevated liver enzymes [R74.8] 03/31/2025 Yes    Body mass index (BMI) 23.0-23.9, adult [Z68.23] 03/31/2025 Not Applicable    Hypoalbuminemia [E88.09] 03/31/2025 Yes    Hypomagnesemia [E83.42] 11/15/2018 Yes    Generalized abdominal pain [R10.84] 04/17/2018 Yes    Type 2 diabetes mellitus without complication, without long-term current use of  insulin [E11.9] 05/05/2015 Yes     Chronic    GERD (gastroesophageal reflux disease) [K21.9]  Yes     Chronic    Acquired hypothyroidism [E03.9] 01/11/2013 Yes     Chronic      Problems Resolved During this Admission:       Discharged Condition: good    Disposition: Home or Self Care    Follow Up:    Patient Instructions:   No discharge procedures on file.    Significant Diagnostic Studies: N/A    Pending Diagnostic Studies:       Procedure Component Value Units Date/Time    Urinalysis, Reflex to Urine Culture Urine, Clean Catch [[id number]] Collected: 03/28/25 1715    Order Status: Sent Lab Status: No result     Specimen: Urine, Clean Catch            Medications:  Reconciled Home Medications:      Medication List        START taking these medications      diclofenac sodium 1 % Gel  Commonly known as: VOLTAREN  Apply 2 g topically 2 (two) times daily. Apply to affected area as needed for pain.            CONTINUE taking these medications      acetaminophen 650 MG Tbsr  Commonly known as: TYLENOL  Take 1,300 mg by mouth 2 (two) times a day.     ascorbic acid (vitamin C) 1000 MG tablet  Commonly known as: VITAMIN C  Take 1,000 mg by mouth once daily.     azelastine 137 mcg (0.1 %) nasal spray  Commonly known as: ASTELIN  1 spray (137 mcg total) by Nasal route 2 (two) times daily.     biotin 10,000 mcg Tbdl  Take 1 tablet by mouth once daily.     blood sugar diagnostic Strp  Commonly known as: FREESTYLE LITE STRIPS  1 strip by Misc.(Non-Drug; Combo Route) route 3 (three) times daily.     blood-glucose meter kit  Use as instructed     calcium-vitamin D3 500 mg-5 mcg (200 unit) per tablet  Commonly known as: OS-KASSANDRA 500 + D3  Take 1 tablet by mouth once daily.     cranberry extract 200 mg Cap  Take 1 capsule by mouth once daily.     donepeziL 10 MG tablet  Commonly known as: ARICEPT  TAKE 1 TABLET(10 MG) BY MOUTH EVERY EVENING     DULoxetine 30 MG capsule  Commonly known as: CYMBALTA  Take 1 capsule (30 mg total) by  mouth 2 (two) times daily.     FREESTYLE EFREN 14 DAY READER Misc  Generic drug: flash glucose scanning reader  Check blood glucose level 3 times daily.     FREESTYLE EFREN 14 DAY SENSOR Kit  Generic drug: flash glucose sensor  APPLY 1 SENSOR EVERY 14 DAYS     gabapentin 300 MG capsule  Commonly known as: NEURONTIN  Take 1 to 2 capsules 3 times daily if needed for leg pain.     hydrocortisone 2.5 % cream  Apply topically 2 (two) times daily as needed.     lactulose 20 gram/30 mL Soln  Commonly known as: CHRONULAC  Take 30 mLs (20 g total) by mouth 3 (three) times daily as needed (Constipation).     lancets 28 gauge Misc  Commonly known as: FREESTYLE LANCETS  30 lancets by Misc.(Non-Drug; Combo Route) route Daily.     levothyroxine 75 MCG tablet  Commonly known as: SYNTHROID  TAKE 1 TABLET BY MOUTH BEFORE  BREAKFAST     LIDOcaine 5 %  Commonly known as: LIDODERM  APPLY 1 TO 3 PATCHES TO BACK EVERY DAY. REMOVE WITHIN 12 HOURS     magnesium 30 mg Tab  Take 30 mg by mouth once. Patient does not know actual dose in MG     magnesium citrate solution  Take 300 mLs by mouth.     metFORMIN 1000 MG tablet  Commonly known as: GLUCOPHAGE  TAKE 1 TABLET(1000 MG) BY MOUTH TWICE DAILY WITH MEALS     mometasone 0.1 % ointment  Commonly known as: ELOCON  Apply topically 2 (two) times a day. Mix 50/50 with mupirocin ointment. Apply in each nostril twice daily.     montelukast 10 mg tablet  Commonly known as: SINGULAIR  Take 1 tablet (10 mg total) by mouth once daily.     ospemifene 60 mg Tab  Commonly known as: OSPHENA  Take by mouth.     pantoprazole 20 MG tablet  Commonly known as: PROTONIX  TAKE 1 TABLET(20 MG) BY MOUTH TWICE DAILY     pregabalin 100 MG capsule  Commonly known as: LYRICA  Take 100 mg by mouth.     PREMARIN vaginal cream  Generic drug: conjugated estrogens  INSERT 0.5 GRAM VAGINALLY 2 TIMES A WEEK     rifAXIMin 550 mg Tab  Commonly known as: XIFAXAN  Take 1 tablet (550 mg total) by mouth 2 (two) times daily.      TRADJENTA 5 mg Tab tablet  Generic drug: linaGLIPtin  TAKE 1 TABLET(5 MG) BY MOUTH EVERY DAY     traZODone 50 MG tablet  Commonly known as: DESYREL  TAKE 1 TABLET(50 MG) BY MOUTH EVERY EVENING     triamcinolone acetonide 0.1% 0.1 % paste  Commonly known as: KENALOG  3 (three) times daily.     UBRELVY 100 mg tablet  Generic drug: ubrogepant  Take 1 tablet daily as needed for migraine headache. May take 1 additional tablet 2 hours later if needed.     valACYclovir 1000 MG tablet  Commonly known as: VALTREX  Take 1 tablet (1,000 mg total) by mouth once daily.            STOP taking these medications      cholestyramine 4 gram packet  Commonly known as: QUESTRAN     diphenoxylate-atropine 2.5-0.025 mg 2.5-0.025 mg per tablet  Commonly known as: LOMOTIL     ferrous sulfate 324 mg (65 mg iron) Tbec     HYDROcodone-acetaminophen 5-325 mg per tablet  Commonly known as: NORCO     loperamide 2 mg capsule  Commonly known as: IMODIUM     ondansetron 8 MG Tbdl  Commonly known as: ZOFRAN-ODT     polyethylene glycol 17 gram Pwpk  Commonly known as: GLYCOLAX              Indwelling Lines/Drains at time of discharge:   Lines/Drains/Airways       None                   Time spent on the discharge of patient: 45 minutes         Mirian Scherer MD  Department of Hospital Medicine  [facility]

## 2025-03-31 NOTE — SUBJECTIVE & OBJECTIVE
Interval History: NAEON. Reports able to pass a BM today, so fa total of 4x BM in the last 24 hours. Tolerating diet. Some complaints of left ankle pain. Ordered Voltaren gel. Stable to discharge home.     Review of Systems  Objective:     Vital Signs (Most Recent):  Temp: 98.1 °F (36.7 °C) (03/31/25 1537)  Pulse: 70 (03/31/25 1537)  Resp: 18 (03/31/25 1537)  BP: (!) 117/57 (03/31/25 1537)  SpO2: 98 % (03/31/25 1537) Vital Signs (24h Range):  Temp:  [97.5 °F (36.4 °C)-99 °F (37.2 °C)] 98.1 °F (36.7 °C)  Pulse:  [65-78] 70  Resp:  [12-18] 18  SpO2:  [95 %-99 %] 98 %  BP: (111-151)/(54-76) 117/57     Weight: 58.1 kg (128 lb)  Body mass index is 23.41 kg/m².    Intake/Output Summary (Last 24 hours) at 3/31/2025 1545  Last data filed at 3/31/2025 1342  Gross per 24 hour   Intake 589.67 ml   Output --   Net 589.67 ml         Physical Exam  Vitals and nursing note reviewed.   Constitutional:       General: She is not in acute distress.     Appearance: She is not ill-appearing, toxic-appearing or diaphoretic.   HENT:      Head: Normocephalic and atraumatic.      Mouth/Throat:      Mouth: Mucous membranes are moist.      Pharynx: Oropharynx is clear. No oropharyngeal exudate.   Eyes:      General: No scleral icterus.     Extraocular Movements: Extraocular movements intact.      Conjunctiva/sclera: Conjunctivae normal.   Cardiovascular:      Rate and Rhythm: Normal rate and regular rhythm.      Pulses: Normal pulses.      Heart sounds: Murmur heard.   Pulmonary:      Effort: Pulmonary effort is normal. No respiratory distress.      Breath sounds: Normal breath sounds. No wheezing.   Abdominal:      General: Abdomen is flat. Bowel sounds are normal. There is no distension.      Palpations: Abdomen is soft.      Tenderness: There is abdominal tenderness (RUQ). There is no guarding or rebound.   Skin:     General: Skin is warm and dry.      Coloration: Skin is not jaundiced.   Neurological:      Mental Status: She is alert and  "oriented to person, place, and time.      Cranial Nerves: No dysarthria or facial asymmetry.      Motor: No weakness.               Significant Labs: All pertinent labs within the past 24 hours have been reviewed.  A1C:   Recent Labs   Lab 03/29/25  0537   HGBA1C 5.7*     Bilirubin:   Recent Labs   Lab 03/28/25  1715 03/29/25  0537 03/30/25  0444 03/31/25  0640   BILITOT 0.9 1.5* 1.4* 1.1*     CBC:   Recent Labs   Lab 03/30/25  0444 03/31/25  0640   WBC 4.05 3.64*   HGB 10.3* 10.1*   HCT 31.0* 29.9*   PLT 61* 63*     CMP:   Recent Labs   Lab 03/30/25  0444 03/31/25  0640    139   K 4.0 3.7    106   CO2 21* 23   BUN 9 7*   CREATININE 0.7 0.8   CALCIUM 9.1 8.9   ALBUMIN 3.1* 3.1*   BILITOT 1.4* 1.1*   ALKPHOS 93 95   AST 73* 45   ALT 62* 47*   ANIONGAP 10 10     Lipase:   No results for input(s): "LIPASE" in the last 48 hours.    Lipid Panel: No results for input(s): "CHOL", "HDL", "LDLCALC", "TRIG", "CHOLHDL" in the last 48 hours.  Magnesium:   Recent Labs   Lab 03/30/25  0444 03/31/25  0640   MG 1.3* 1.5*     Urine Culture: No results for input(s): "LABURIN" in the last 48 hours.  Urine Studies: No results for input(s): "COLORU", "APPEARANCEUA", "PHUR", "SPECGRAV", "PROTEINUA", "GLUCUA", "KETONESU", "BILIRUBINUA", "OCCULTUA", "NITRITE", "UROBILINOGEN", "LEUKOCYTESUR", "RBCUA", "WBCUA", "BACTERIA", "SQUAMEPITHEL", "HYALINECASTS" in the last 48 hours.    Invalid input(s): "WRIGHTSUR"    Significant Imaging: I have reviewed all pertinent imaging results/findings within the past 24 hours.  "

## 2025-03-31 NOTE — PLAN OF CARE
[facility]ias [facility]aily - [facility]      HOME HEALTH ORDERS  FACE TO FACE ENCOUNTER    Patient Name: Anayeli Judd  YOB: 1945    PCP: Darci Guthrie MD   [place] [facility] [address] [facility] [place] [address]  PCP Phone Number: [phone]  PCP Fax: [phone]    Encounter Date: 3/28/25    Admit to Home Health    Diagnoses:  Active Hospital Problems    Diagnosis  POA    *Partial small bowel obstruction [K56.600]  Yes    Elevated liver enzymes [R74.8]  Yes    Body mass index (BMI) 23.0-23.9, adult [Z68.23]  Not Applicable    Hypoalbuminemia [E88.09]  Yes    Hypomagnesemia [E83.42]  Yes    Generalized abdominal pain [R10.84]  Yes    Type 2 diabetes mellitus without complication, without long-term current use of insulin [E11.9]  Yes     Chronic    GERD (gastroesophageal reflux disease) [K21.9]  Yes     Chronic    Acquired hypothyroidism [E03.9]  Yes     Chronic      Resolved Hospital Problems   No resolved problems to display.       Follow Up Appointments:  Future Appointments   Date Time Provider Department Center   5/14/2025  2:30 PM Jaqueline Espinoza MD [facility]       Allergies:  Review of patient's allergies indicates:   Allergen Reactions    Codeine Itching and Nausea And Vomiting    Dilaudid [hydromorphone (bulk)] Other (See Comments)     Oversedating, head burning. Pt prefers to avoid.       Percocet [oxycodone-acetaminophen] Itching    Sulfa (sulfonamide antibiotics) Itching and Nausea And Vomiting    Dilaudid [hydromorphone]     Latex, natural rubber Rash    Opioids - morphine analogues Rash       Medications: Review discharge medications with patient and family and provide education.    Current Medications[1]     Medication List        START taking these medications      diclofenac sodium 1 % Gel  Commonly known as: VOLTAREN  Apply 2 g topically 2 (two) times daily. Apply to affected area as needed for pain.            CONTINUE taking these medications      acetaminophen 650 MG Tbsr  Commonly  known as: TYLENOL  Take 1,300 mg by mouth 2 (two) times a day.     ascorbic acid (vitamin C) 1000 MG tablet  Commonly known as: VITAMIN C  Take 1,000 mg by mouth once daily.     azelastine 137 mcg (0.1 %) nasal spray  Commonly known as: ASTELIN  1 spray (137 mcg total) by Nasal route 2 (two) times daily.     biotin 10,000 mcg Tbdl  Take 1 tablet by mouth once daily.     blood sugar diagnostic Strp  Commonly known as: FREESTYLE LITE STRIPS  1 strip by Misc.(Non-Drug; Combo Route) route 3 (three) times daily.     blood-glucose meter kit  Use as instructed     calcium-vitamin D3 500 mg-5 mcg (200 unit) per tablet  Commonly known as: OS-KASSANDRA 500 + D3  Take 1 tablet by mouth once daily.     cranberry extract 200 mg Cap  Take 1 capsule by mouth once daily.     donepeziL 10 MG tablet  Commonly known as: ARICEPT  TAKE 1 TABLET(10 MG) BY MOUTH EVERY EVENING     DULoxetine 30 MG capsule  Commonly known as: CYMBALTA  Take 1 capsule (30 mg total) by mouth 2 (two) times daily.     FREESTYLE EFREN 14 DAY READER Misc  Generic drug: flash glucose scanning reader  Check blood glucose level 3 times daily.     FREESTYLE EFREN 14 DAY SENSOR Kit  Generic drug: flash glucose sensor  APPLY 1 SENSOR EVERY 14 DAYS     gabapentin 300 MG capsule  Commonly known as: NEURONTIN  Take 1 to 2 capsules 3 times daily if needed for leg pain.     hydrocortisone 2.5 % cream  Apply topically 2 (two) times daily as needed.     lactulose 20 gram/30 mL Soln  Commonly known as: CHRONULAC  Take 30 mLs (20 g total) by mouth 3 (three) times daily as needed (Constipation).     lancets 28 gauge Misc  Commonly known as: FREESTYLE LANCETS  30 lancets by Misc.(Non-Drug; Combo Route) route Daily.     levothyroxine 75 MCG tablet  Commonly known as: SYNTHROID  TAKE 1 TABLET BY MOUTH BEFORE  BREAKFAST     LIDOcaine 5 %  Commonly known as: LIDODERM  APPLY 1 TO 3 PATCHES TO BACK EVERY DAY. REMOVE WITHIN 12 HOURS     magnesium 30 mg Tab  Take 30 mg by mouth once. Patient  does not know actual dose in MG     magnesium citrate solution  Take 300 mLs by mouth.     metFORMIN 1000 MG tablet  Commonly known as: GLUCOPHAGE  TAKE 1 TABLET(1000 MG) BY MOUTH TWICE DAILY WITH MEALS     mometasone 0.1 % ointment  Commonly known as: ELOCON  Apply topically 2 (two) times a day. Mix 50/50 with mupirocin ointment. Apply in each nostril twice daily.     montelukast 10 mg tablet  Commonly known as: SINGULAIR  Take 1 tablet (10 mg total) by mouth once daily.     ospemifene 60 mg Tab  Commonly known as: OSPHENA  Take by mouth.     pantoprazole 20 MG tablet  Commonly known as: PROTONIX  TAKE 1 TABLET(20 MG) BY MOUTH TWICE DAILY     pregabalin 100 MG capsule  Commonly known as: LYRICA  Take 100 mg by mouth.     PREMARIN vaginal cream  Generic drug: conjugated estrogens  INSERT 0.5 GRAM VAGINALLY 2 TIMES A WEEK     rifAXIMin 550 mg Tab  Commonly known as: XIFAXAN  Take 1 tablet (550 mg total) by mouth 2 (two) times daily.     TRADJENTA 5 mg Tab tablet  Generic drug: linaGLIPtin  TAKE 1 TABLET(5 MG) BY MOUTH EVERY DAY     traZODone 50 MG tablet  Commonly known as: DESYREL  TAKE 1 TABLET(50 MG) BY MOUTH EVERY EVENING     triamcinolone acetonide 0.1% 0.1 % paste  Commonly known as: KENALOG  3 (three) times daily.     UBRELVY 100 mg tablet  Generic drug: ubrogepant  Take 1 tablet daily as needed for migraine headache. May take 1 additional tablet 2 hours later if needed.     valACYclovir 1000 MG tablet  Commonly known as: VALTREX  Take 1 tablet (1,000 mg total) by mouth once daily.            STOP taking these medications      cholestyramine 4 gram packet  Commonly known as: QUESTRAN     diphenoxylate-atropine 2.5-0.025 mg 2.5-0.025 mg per tablet  Commonly known as: LOMOTIL     ferrous sulfate 324 mg (65 mg iron) Tbec     HYDROcodone-acetaminophen 5-325 mg per tablet  Commonly known as: NORCO     loperamide 2 mg capsule  Commonly known as: IMODIUM     ondansetron 8 MG Tbdl  Commonly known as: ZOFRAN-ODT      polyethylene glycol 17 gram Pwpk  Commonly known as: GLYCOLAX                I have seen and examined this patient within the last 30 days. My clinical findings that support the need for the home health skilled services and home bound status are the following:no   Weakness/numbness causing balance and gait disturbance due to Weakness/Debility making it taxing to leave home.  Requiring assistive device to leave home due to unsteady gait caused by  Weakness/Debility.     Diet:   diabetic diet 2000 calorie    Labs:  Report Lab results to PCP.    Referrals/ Consults  Physical Therapy to evaluate and treat. Evaluate for home safety and equipment needs; Establish/upgrade home exercise program. Perform / instruct on therapeutic exercises, gait training, transfer training, and Range of Motion.  Occupational Therapy to evaluate and treat. Evaluate home environment for safety and equipment needs. Perform/Instruct on transfers, ADL training, ROM, and therapeutic exercises.  Aide to provide assistance with personal care, ADLs, and vital signs.    Activities:   activity as tolerated    Nursing:   Agency to admit patient within 24 hours of hospital discharge unless specified on physician order or at patient request    SN to complete comprehensive assessment including routine vital signs. Instruct on disease process and s/s of complications to report to MD. Review/verify medication list sent home with the patient at time of discharge  and instruct patient/caregiver as needed. Frequency may be adjusted depending on start of care date.     Skilled nurse to perform up to 3 visits PRN for symptoms related to diagnosis    Notify MD if SBP > 160 or < 90; DBP > 90 or < 50; HR > 120 or < 50; Temp > 101; O2 < 88%;     Ok to schedule additional visits based on staff availability and patient request on consecutive days within the home health episode.    When multiple disciplines ordered:    Start of Care occurs on Sunday - Wednesday  schedule remaining discipline evaluations as ordered on separate consecutive days following the start of care.    Thursday SOC -schedule subsequent evaluations Friday and Monday the following week.     Friday - Saturday SOC - schedule subsequent discipline evaluations on consecutive days starting Monday of the following week.    For all post-discharge communication and subsequent orders please contact patient's primary care physician. If unable to reach primary care physician or do not receive response within 30 minutes, please contact on call nursing for clinical staff order clarification    Miscellaneous   NA    Home Health Aide:  Nursing Three times weekly, Physical Therapy Three times weekly, Occupational Therapy Three times weekly, and Home Health Aide Three times weekly    Wound Care Orders  no    I certify that this patient is confined to her home and needs intermittent skilled nursing care, physical therapy, and occupational therapy.              [1]   Current Facility-Administered Medications   Medication Dose Route Frequency Provider Last Rate Last Admin    dextrose 50% injection 12.5 g  12.5 g Intravenous PRN Vale Mir MD        dextrose 50% injection 25 g  25 g Intravenous PRN Vale Mir MD        diclofenac sodium 1 % gel 2 g  2 g Topical (Top) Daily Mirian Taylor MD   2 g at 03/31/25 1358    donepeziL tablet 10 mg  10 mg Oral QHS Mirian Taylor MD        enoxaparin injection 40 mg  40 mg Subcutaneous Q24H (prophylaxis, 1700) Vale Mir MD   40 mg at 03/30/25 1658    gabapentin capsule 300 mg  300 mg Oral TID Mirian Taylor MD   300 mg at 03/31/25 1358    glucagon (human recombinant) injection 1 mg  1 mg Intramuscular PRN Vale Mir MD        glucose chewable tablet 16 g  16 g Oral PRN Vale Mir MD        glucose chewable tablet 24 g  24 g Oral PRN Vale Mir MD        insulin aspart U-100 pen 0-5 Units  0-5 Units Subcutaneous Q6H KAY Mir  MD Vale   3 Units at 03/31/25 1358    levothyroxine tablet 50 mcg  50 mcg Oral Before breakfast Angie Trevino MD   50 mcg at 03/31/25 0530    LIDOcaine 5 % patch 2 patch  2 patch Transdermal Daily Herman Vuong DO   2 patch at 03/31/25 0858    naloxone 0.4 mg/mL injection 0.02 mg  0.02 mg Intravenous PRN Vale Mir MD        ondansetron injection 4 mg  4 mg Intravenous Q6H PRN Vale Mir MD        sodium chloride 0.9% flush 10 mL  10 mL Intravenous Q12H PRN Vale Mir MD

## 2025-03-31 NOTE — MEDICAL/APP STUDENT
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 Patient notes chronic issues of abdominal pain related to her extensive surgical history with current symptom onset being the morning PTA after eating breakfast. The pain initially began in the lower abdominal quadrants, gradually radiated to involve the entire abdomen, and worsened with oral intake of food/water. Associated symptoms include nausea. LBM was also noted to be the morning PTA. Denies fever/chills, vomiting, chest pain, shortness of breath, difficulty urinating, dysuria. Pain currently rated 3/10 after receiving pain medications, previously rated 10/10 upon symptom onset.     Patient remained afebrile and hemodynamically stable while in the ED. Labs largely unremarkable other than stable macrocytic anemia, no leukocytosis or acute electrolyte abnormalities noted. CTAP w contrast showed moderate to large amount of colonic stool burden, surgical changes related to partial SMR with patent anastomoses, multiple dilated proximal to mid small bowel loops noting relative decompression of the distal small bowel, and no convincing transition point or pneumatosis. Given morphine 4 mg IV x2, zofran 4 mg IV with symptomatic relief, as well as, 1L IV fluid bolus and IV benadryl 12.5 mg for itching. Admitted to hospital medicine for further management.     Hospital Course  Admitted for partial SBO. General surgery following. NGT in place. Concerns for elevated ALFT, currently down trending. Unclear etiology.         Interval History  NAEO. Pt requested prn gabapentin at midnight, scheduled her at home dose of gabapentin. Pt reports L ankle joint pain/swelling, 10/10 when walking, that has persisted longer than similar to previous episodes in various upper and lower extremity joints. Pt is having BMs, mild nonspecific abdominal cramping, s/o by gen surg.        VITALS  Vital Signs (Most Recent):  Temp: 99 °F (37.2 °C) (03/31/25 1219)  Pulse: 78 (03/31/25 1219)  Resp: 18 (03/31/25 1219)  BP: 124/67 (03/31/25 1219)  SpO2: 99 % (03/31/25 1219) Vital Signs (24h Range):  Temp:  [97.5 °F (36.4 °C)-99 °F (37.2 °C)] 99 °F (37.2 °C)  Pulse:  [65-78] 78  Resp:  [12-18] 18  SpO2:  [95 %-99 %] 99 %  BP: (111-151)/(54-76) 124/67   Weight: 58.1 kg (128 lb)  Body mass index is 23.41 kg/m².    Review of Systems   Constitutional: Negative.    Eyes: Negative.    Respiratory: Negative.     Cardiovascular: Negative.    Gastrointestinal:  Positive for abdominal pain. Negative for nausea and vomiting.        Nonspecific abdominal cramping    Musculoskeletal:  Positive for joint pain.        L ankle joint pain   Skin: Negative.    Neurological: Negative.         Physical Exam  Constitutional:       Appearance: Normal appearance.   Cardiovascular:      Rate and Rhythm: Normal rate and regular rhythm.      Pulses:           Dorsalis pedis pulses are 2+ on the left side.      Heart sounds: Normal heart sounds.   Pulmonary:      Effort: Pulmonary effort is normal.      Breath sounds: Normal breath sounds.   Abdominal:      General: Bowel sounds are normal.      Palpations: Abdomen is soft.      Tenderness: There is abdominal tenderness.   Musculoskeletal:      Left ankle: Swelling present. Tenderness present.        Legs:    Skin:     General: Skin is warm and dry.   Neurological:      Mental Status: She is alert and oriented to person, place, and time.   Psychiatric:         Behavior: Behavior normal.          I/O's    Intake/Output Summary (Last 24  "hours) at 3/31/2025 1220  Last data filed at 3/31/2025 0830  Gross per 24 hour   Intake 539.67 ml   Output --   Net 539.67 ml     Net IO Since Admission: 1,150.98 mL [03/31/25 1220]    REPLACE LYTES  Recent Labs   Lab 03/29/25  0537 03/30/25  0444 03/31/25  0640   MG 1.4* 1.3* 1.5*   PHOS 3.5 4.0 3.5       Recent Labs   Lab 03/29/25  0537 03/30/25  0444 03/31/25  0640   * 136 139   K 4.5 4.0 3.7    105 106   CO2 20* 21* 23   BUN 10 9 7*   CREATININE 1.1 0.7 0.8   ANIONGAP 9 10 10       NEURO / PAIN/ OBJECTIVE:  CIWA:    NIF:      Opioid Risk Assessment:               CARDIOVASCULAR    The ASCVD Risk score (Raheel MITCHELL, et al., 2019) failed to calculate for the following reasons:    The 2019 ASCVD risk score is only valid for ages 40 to 79    No results for input(s): "BNP", "TROPONINI" in the last 168 hours.    Results for orders placed or performed during the hospital encounter of 02/05/24   EKG 12-lead    Collection Time: 02/05/24  1:36 PM   Result Value Ref Range    QRS Duration 70 ms    OHS QTC Calculation 454 ms    Narrative    Test Reason : R10.9,    Vent. Rate : 095 BPM     Atrial Rate : 095 BPM     P-R Int : 140 ms          QRS Dur : 070 ms      QT Int : 362 ms       P-R-T Axes : 051 045 062 degrees     QTc Int : 454 ms    Normal sinus rhythm  Normal ECG  When compared with ECG of 12-JAN-2024 22:47,  Premature atrial complexes are no longer Present  Nonspecific T wave abnormality now evident in Lateral leads  Confirmed by Gonzalez Mishra MD ([id number]) on 2/5/2024 2:48:40 PM    Referred By:             Confirmed By:Gonzalez Mishra MD       No results found for this or any previous visit.         PULMONARY     No results found in the last 24 hours.   [unfilled]        G/U-RENAL  [place]:  HD:  Recent Labs   Lab 03/30/25 0444 03/31/25  0640   BUN 9 7*   CREATININE 0.7 0.8   CO2 21* 23   CALCIUM 9.1 8.9   MG 1.3* 1.5*   PHOS 4.0 3.5   ANIONGAP 10 10       Intake/Output Summary (Last 24 hours) at " "3/31/2025 1220  Last data filed at 3/31/2025 0830  Gross per 24 hour   Intake 539.67 ml   Output --   Net 539.67 ml                GASTROINTESTINAL  DIET:Diet Consistent Carbohydrate 2000 Calories (up to 75 gm per meal)  LAST BM:   / /Stool Occurrence: 1  Recent Labs   Lab 03/30/25  0444 03/31/25  0640   ALKPHOS 93 95   AST 73* 45   ALT 62* 47*   BILITOT 1.4* 1.1*   ALBUMIN 3.1* 3.1*        ENDOCRINE    No results for input(s): "TSH", "T4" in the last 48 hours.    Invalid input(s): "T3"  5.7          INSULIN:         HEMATOLOGY    Recent Labs   Lab 03/30/25 0444 03/31/25  0640   WBC 4.05 3.64*   HGB 10.3* 10.1*   HCT 31.0* 29.9*   PLT 61* 63*   * 103*   RDW 13.3 13.2     No results for input(s): "PT", "INR", "APTT" in the last 48 hours.      INFECTIOUS DISEASE      Recent Labs   Lab 03/30/25 0444 03/31/25  0640   WBC 4.05 3.64*     Antibiotics (72h ago, onward)      None          Lab Results   Component Value Date    LABBLOO No growth after 5 days. 02/10/2022    LABBLOO No growth after 5 days. 02/10/2022    LABBLOO NO GROWTH AFTER 5 DAYS  - FINAL REPORT. 10/27/2011    LABBLOO NO GROWTH AFTER 5 DAYS  - FINAL REPORT. 10/27/2011    LABBLOO NO GROWTH AFTER 5 DAYS  - FINAL REPORT. 10/15/2011     Lab Results   Component Value Date    LABURIN KLEBSIELLA OXYTOCA ESBL  >100,000 cfu/ml   (A) 10/03/2024    LABURIN KLEBSIELLA PNEUMONIAE  >100,000 cfu/ml   (A) 04/16/2024    LABURIN KLEBSIELLA PNEUMONIAE  >100,000 cfu/ml   (A) 02/05/2024    LABURIN  01/29/2024     Multiple organisms isolated. None in predominance.  Repeat if    LABURIN clinically necessary. 01/29/2024         RADIOLOGY  No results found in the last 24 hours.      SCHEDULED MEDS   diclofenac sodium  2 g Topical (Top) Daily    donepeziL  10 mg Oral QHS    enoxparin  40 mg Subcutaneous Q24H (prophylaxis, 1700)    gabapentin  300 mg Oral TID    levothyroxine  50 mcg Oral Before breakfast    LIDOcaine  2 patch Transdermal Daily    magnesium sulfate 2 g IVPB  " 2 g Intravenous Once           PRN:  dextrose 50%, 12.5 g, PRN  dextrose 50%, 25 g, PRN  glucagon (human recombinant), 1 mg, PRN  glucose, 16 g, PRN  glucose, 24 g, PRN  insulin aspart U-100, 0-5 Units, Q6H PRN  naloxone, 0.02 mg, PRN  ondansetron, 4 mg, Q6H PRN  sodium chloride 0.9%, 10 mL, Q12H PRN      DVT:  Anticoagulants   Medication Route Frequency    enoxaparin injection 40 mg Subcutaneous Q24H (prophylaxis, 1700)       CONTINIOUS INFUSIONS        HOME MEDS  Current Outpatient Medications   Medication Instructions    acetaminophen (TYLENOL) 1,300 mg, 2 times daily    ascorbic acid (vitamin C) (VITAMIN C) 1,000 mg, Daily    azelastine (ASTELIN) 137 mcg, Nasal, 2 times daily    biotin 10,000 mcg TbDL 1 tablet, Daily    blood sugar diagnostic (FREESTYLE LITE STRIPS) Strp 1 strip, Misc.(Non-Drug; Combo Route), 3 times daily    blood-glucose meter kit Use as instructed    calcium-vitamin D3 (OS-KASSANDRA 500 + D3) 500 mg-5 mcg (200 unit) per tablet 1 tablet, Daily    cholestyramine (QUESTRAN) 4 gram packet 4 g, Oral, 3 times daily with meals    cranberry extract 200 mg Cap 1 capsule, Daily    diphenoxylate-atropine 2.5-0.025 mg (LOMOTIL) 2.5-0.025 mg per tablet 1 tablet, Oral, 3 times daily PRN    donepeziL (ARICEPT) 10 MG tablet TAKE 1 TABLET(10 MG) BY MOUTH EVERY EVENING    DULoxetine (CYMBALTA) 30 mg, Oral, 2 times daily    ferrous sulfate 324 mg, Oral, Daily    flash glucose scanning reader (FREESTYLE EFREN 14 DAY READER) Misc Check blood glucose level 3 times daily.    flash glucose sensor (FREESTYLE EFREN 14 DAY SENSOR) Kit APPLY 1 SENSOR EVERY 14 DAYS    gabapentin (NEURONTIN) 300 MG capsule Take 1 to 2 capsules 3 times daily if needed for leg pain.    HYDROcodone-acetaminophen (NORCO) 5-325 mg per tablet 1 tablet, Oral, Every 6 hours PRN    hydrocortisone 2.5 % cream Topical (Top), 2 times daily PRN    lactulose (CHRONULAC) 20 g, Oral, 3 times daily PRN    lancets (FREESTYLE LANCETS) 28 gauge Misc 30 lancets,  Misc.(Non-Drug; Combo Route), Daily    levothyroxine (SYNTHROID) 75 mcg, Oral, Before breakfast    LIDOcaine (LIDODERM) 5 % APPLY 1 TO 3 PATCHES TO BACK EVERY DAY. REMOVE WITHIN 12 HOURS    loperamide (IMODIUM) 2 mg, Oral, 2 times daily PRN    magnesium citrate solution 300 mLs    magnesium 30 mg, Once    metFORMIN (GLUCOPHAGE) 1,000 mg, Oral, 2 times daily with meals    mometasone (ELOCON) 0.1 % ointment Topical (Top), 2 times daily, Mix 50/50 with mupirocin ointment. Apply in each nostril twice daily.    montelukast (SINGULAIR) 10 mg, Oral, Daily    ondansetron (ZOFRAN-ODT) 8 mg, Oral, Every 8 hours PRN    ospemifene (OSPHENA) 60 mg Tab Take by mouth.    pantoprazole (PROTONIX) 20 mg, Oral, 2 times daily    polyethylene glycol (GLYCOLAX) 17 g, Oral, Daily    pregabalin (LYRICA) 100 mg    PREMARIN vaginal cream INSERT 0.5 GRAM VAGINALLY 2 TIMES A WEEK    rifAXIMin (XIFAXAN) 550 mg, Oral, 2 times daily    TRADJENTA 5 mg, Oral    traZODone (DESYREL) 50 mg, Oral, Nightly    triamcinolone acetonide 0.1% (KENALOG) 0.1 % paste 3 times daily    ubrogepant (UBRELVY) 100 mg tablet Take 1 tablet daily as needed for migraine headache. May take 1 additional tablet 2 hours later if needed.    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  Associated symptoms include nausea and PO aversion, no vomiting.     CTAP with contrast showed multiple dilated proximal to mid small bowel loops noting relative decompression of the distal small bowel, no convincing transition point, and fluid filled/dilated stomach.    Plan  -general surgery  s/o   - transitioned from liquids to solid food as tolerated  -PRN antiemetics and pain control      2. Type 2 diabetes mellitus without complication, without long-term current use of insulin  Patient's FSGs are controlled on current medication regimen.  Last A1c reviewed-   Lab Results   Component Value Date    HGBA1C 5.7 (H) 03/29/2025     Most recent fingerstick glucose reviewed-   Recent Labs   Lab 03/29/25  1223 03/29/25  1747 03/29/25  2350 03/30/25  0424   POCTGLUCOSE 104 102 95 101     Current correctional scale  Low  Maintain anti-hyperglycemic dose as follows-   Antihyperglycemics (From admission, onward)      Start     Stop Route Frequency Ordered    03/29/25 0256  insulin aspart U-100 pen 0-5 Units         -- SubQ Every 6 hours PRN 03/29/25 0156          Plan  -hold oral hypoglycemics while patient is in the hospital.  - hemoglobin A1c 5.7  -accuchecks Q6H while NPO      3. GERD (gastroesophageal reflux disease)  Plan  -resume home PPI     4. Acquired hypothyroidism  Plan  -  resume levothyroxine 75 mcg daily     5. L Ankle swelling/pain   New L ankle joint pain/swelling that has persisted longer (24hrs) than similar to previous episodes ( normally resolve w/o intervention in couple hours) in various upper and lower extremity joints. Pt reports osteoarthritis and first degree family hx rheumatoid arthritis in.    Plan  - ordered x-ray L ankle    - ordered ESR/CRP, uric acid   - sx home dose gabapentin    -  prn topical diclofenac ordered             Discharge Planning: Investigate L ankle joint swelling/pain r/u acute sequelae and cofirm ambulation. S/o of SBO pending toleration of solids.  LOI: 4/1/25  Code Status:  Medical Readiness for Discharge Date:  Discharge Plan:d/c to home pending above.

## 2025-03-31 NOTE — NURSING
"[facility] Plan of Care Note    Dx:   SBO (small bowel obstruction) [K56.609]  Chest pain [R07.9]    SShift Events: N/A     Goals of Care: Progressing towards goals     Neuro: AAOx4       Vital Signs: BP (!) 115/54   Pulse 67   Temp 98.1 °F (36.7 °C) (Oral)   Resp 12   Ht 5' 2" (1.575 m)   Wt 58.1 kg (128 lb)   LMP  (LMP Unknown)   SpO2 95%   BMI 23.41 kg/m²     Respiratory: RA    Diet: Diet Clear Liquid      Is patient tolerating current diet? Yes    GTTS: N/A    Urine Output/Bowel Movement:   No intake/output data recorded.  Last Bowel Movement: 03/30/25      Drains/Tubes/Tube Feeds (include total output/shift):   No intake/output data recorded.      Lines: PIV x1        Accuchecks:Q6     Skin: Intact     Fall Risk Score: 4     Activity level? Standby assist      Any scheduled procedures? N/a     Any safety concerns? N/a     Other: n/a    "

## 2025-03-31 NOTE — NURSING
Pt is ready for discharge. AVS provided. IV taken out. Personal belongings gathered. Pt will be going home with diclofenac gel for her L ankle pain. No other meds will be taken from pharmacy as she is okay to use the gel tube from this morning. MD notified. Pharmacy notified. Ms. Vela, her caregiver, will drive her home. Wheelchair transport ordered.

## 2025-03-31 NOTE — PLAN OF CARE
[facility] on-call nurse unable to pull up patient to confirm if existing patient. Weekday CM will follow up in the AM.    On-call CM received secure message from team requesting assistance with late discharge. Patient needs home health secured. Reviewed chart, appears patient may be current with [facility]. Called branch office, left message for on-call nurse to confirm patient's status. Epic referral and updated orders sent for resumption of care. CM will follow-up prior to end of shift.      Danae Truong RN  Weekend  - [facility]  [phone]

## 2025-03-31 NOTE — CARE UPDATE
Unit ANTONETTE Care Support Interaction      I have reviewed the chart of Anayeli Judd who is hospitalized for Partial small bowel obstruction. The patient is currently located in the following unit: [facility]        I have assisted the primary physician in management of the following:           I have seen and examined the patient and provided the following support:     Patient experience rounds - positive experience reported  Agreeable to digital medicine program      Laurel Alcantara, MAGNOLIAP-C  Unit Based ANTONETTE

## 2025-03-31 NOTE — ASSESSMENT & PLAN NOTE
Patient's FSGs are controlled on current medication regimen.  Last A1c reviewed-   Lab Results   Component Value Date    HGBA1C 5.7 (H) 03/29/2025     Most recent fingerstick glucose reviewed-   Recent Labs   Lab 03/30/25  2355 03/31/25  0625 03/31/25  0629 03/31/25  1219   POCTGLUCOSE 112* 133* 130* 254*     Current correctional scale  Low  Maintain anti-hyperglycemic dose as follows-   Antihyperglycemics (From admission, onward)      Start     Stop Route Frequency Ordered    03/29/25 0256  insulin aspart U-100 pen 0-5 Units         -- SubQ Every 6 hours PRN 03/29/25 0156          -hold oral hypoglycemics while patient is in the hospital.  - hemoglobin A1c 5.7  -accuchecks Q6H while NPO

## 2025-03-31 NOTE — CARE UPDATE
I have reviewed the chart of Anayeli Judd who is hospitalized for the following:[facility]    Active Hospital Problems    Diagnosis    *Partial small bowel obstruction    Elevated liver enzymes    Body mass index (BMI) 23.0-23.9, adult    Hypoalbuminemia  --will continue to monitor   No intervention at this time      Hypomagnesemia  Will continue to monitor   No intervention at this time      Generalized abdominal pain    Type 2 diabetes mellitus without complication, without long-term current use of insulin    GERD (gastroesophageal reflux disease)    Acquired hypothyroidism        KENNEDY Stafford-PITO  Unit Based ANTONETTE

## 2025-03-31 NOTE — ASSESSMENT & PLAN NOTE
Patient has Abnormal Magnesium: hypomagnesemia. Will continue to monitor electrolytes closely. Will replace the affected electrolytes and repeat labs to be done after interventions completed. The patient's magnesium results have been reviewed and are listed below.  Recent Labs   Lab 03/31/25  0640   MG 1.5*

## 2025-04-01 NOTE — PLAN OF CARE
[facility]  Discharge Final Note    Primary Care Provider: Darci Guthrie MD  Expected Discharge Date: 3/31/2025    Patient medically ready for discharge to home with [facility] .     Transportation by family.     Is family/patient aware of discharge: yes     Hospital follow up scheduled: yes       Final Discharge Note (most recent)       Final Note - 04/01/25 1307          Final Note    Assessment Type Final Discharge Note     Anticipated Discharge Disposition Home-Health Care [facility]     Hospital Resources/Appts/Education Provided Provided patient/caregiver with written discharge plan information                     Important Message from Medicare         Referral Info (most recent)       Referral Info    No documentation.                 Contact Info       Darci Guthrie MD   Specialty: Internal Medicine   Relationship: PCP - General    [employer] [address]  [facility] [address]   Phone: [phone]       Next Steps: Follow up          Future Appointments   Date Time Provider Department Center   5/14/2025  2:30 PM Jaqueline Espinoza MD [facility] HEPAT [facility]cia RN  Case Management  Ext: [phone]  04/01/2025

## 2025-04-02 ENCOUNTER — PATIENT OUTREACH (OUTPATIENT)
Dept: ADMINISTRATIVE | Facility: CLINIC | Age: 80
End: 2025-04-02
Payer: MEDICARE

## 2025-04-02 NOTE — PROGRESS NOTES
C3 nurse spoke with Anayeli Judd's daughter, Danielle, for a TCC post hospital discharge follow up call. The patient did not have a scheduled HOSFU appointment with her PCP, Darci Guthrie MD within 5-7 days post hospital discharge date of 3/31/25. C3 nurse scheduled a [facility] HOSFU with Heaven Galeano NP for 4/8/25 per pt's daughter's request. No messages routed at this time.

## 2025-04-02 NOTE — PROGRESS NOTES
C3 nurse attempted to contact Anayeli Judd for a TCC post hospital discharge follow up call. No answer, left voicemail with callback information. The patient does not have a scheduled HOSFU appointment with her PCP, Darci Guthrie MD within the first 5-7 days post DC date of 3/31/25. No messages routed at this time.

## 2025-04-08 ENCOUNTER — OFFICE VISIT (OUTPATIENT)
Dept: HOME HEALTH SERVICES | Facility: CLINIC | Age: 80
End: 2025-04-08
Payer: MEDICARE

## 2025-04-08 VITALS
TEMPERATURE: 98 F | HEART RATE: 83 BPM | RESPIRATION RATE: 17 BRPM | DIASTOLIC BLOOD PRESSURE: 63 MMHG | SYSTOLIC BLOOD PRESSURE: 102 MMHG | OXYGEN SATURATION: 97 %

## 2025-04-08 DIAGNOSIS — G20.C PARKINSONISM, UNSPECIFIED PARKINSONISM TYPE: ICD-10-CM

## 2025-04-08 DIAGNOSIS — D46.9 MYELODYSPLASTIC SYNDROME: ICD-10-CM

## 2025-04-08 DIAGNOSIS — R10.9 ABDOMINAL DISCOMFORT: ICD-10-CM

## 2025-04-08 DIAGNOSIS — K76.6 PORTAL HYPERTENSION: ICD-10-CM

## 2025-04-08 DIAGNOSIS — C18.9 MALIGNANT NEOPLASM OF COLON, UNSPECIFIED PART OF COLON: ICD-10-CM

## 2025-04-08 DIAGNOSIS — E11.51 TYPE 2 DIABETES MELLITUS WITH DIABETIC PERIPHERAL ANGIOPATHY WITHOUT GANGRENE, WITHOUT LONG-TERM CURRENT USE OF INSULIN: ICD-10-CM

## 2025-04-08 DIAGNOSIS — E11.9 TYPE 2 DIABETES MELLITUS WITHOUT COMPLICATION, WITHOUT LONG-TERM CURRENT USE OF INSULIN: Primary | Chronic | ICD-10-CM

## 2025-04-08 DIAGNOSIS — J43.9 PULMONARY EMPHYSEMA, UNSPECIFIED EMPHYSEMA TYPE: ICD-10-CM

## 2025-04-09 NOTE — PROGRESS NOTES
[facility]  Transitional Care Management (TCM) Home Visit    Encounter Provider: Heaven Guzman   PCP: Darci Guthrie MD  Consult Requested By: No ref. provider found  Admit Date: 3/28/25   IP Discharge Date: 3/31/25  Hospital Length of Stay: 3  Days since discharge (from IP or SNF):    [facility] On Call Contact Note: 4/2/25  Hospital Diagnosis: No admission diagnoses are documented for this encounter.     HISTORY OF PRESENT ILLNESS      Patient ID: Anayeli Judd is a 80 y.o. female was recently admitted to the hospital, this is their TCM encounter.    Hospital Course Synopsis:    Admitted for partial SBO. General surgery following. NGT in place. Concerns for elevated ALFT, currently down trending. NGT tube removed on 03/30 per Gen surg recommendations. Has passed BM x4 for the past 24 hrs. Tolerating solid diet.Had some concerns for ankle pain that was responsive to volatren cream, will prescribed at time of discharge.  Stable to discharge home. Educated about the importance of following up with PCP and specialist. Instructed to come back to the ED in the case of worsening symptoms, intractable nausea, vomiting, abdominal pain or distension,  or if deemed necessary.  Plan discussed with patient who verbalized understanding and agreed to it.         DECISION MAKING TODAY       Assessment & Plan:  1. Type 2 diabetes mellitus without complication, without long-term current use of insulin  Assessment & Plan:  Discussed with patient routine diabetic care that includes but are not limited to regular eye exams, daily foot exams, proper nutrition, regular BG monitoring at home (fasting and mealtime) and medication compliance in a diabetic.  Target morning BS  and meal-time BS <180.     Lab Results   Component Value Date    HGBA1C 5.7 (H) 03/29/2025      - Monitor FSG  - ADA diet   - continue Linagliptin   - F/U w/ PCP       2. Pulmonary emphysema, unspecified emphysema type  Assessment & Plan:  SOB with  exertion. O2 sats 97% on RA.   - monitor O2 sats   - F/U w/ PCP       3. Myelodysplastic syndrome    4. Type 2 diabetes mellitus with diabetic peripheral angiopathy without gangrene, without long-term current use of insulin    5. Portal hypertension  Assessment & Plan:  Noted.       6. Parkinsonism, unspecified Parkinsonism type  Overview:  R resting tremor, cogwheel rigidity LE > UE     Assessment & Plan:  - Fall Precaution   - HH PT/OT  - F/U w/ PCP       7. Malignant neoplasm of colon, unspecified part of colon  Overview:  Diagnosed in 2011, s/p resection of bowel with reastemosis      8. Abdominal discomfort  Assessment & Plan:  Reports diarrhea.   - encouraged oral hydration   - hold laxatives  - F/U w/ PCP           Medication List on Discharge:     Medication List            Accurate as of April 8, 2025  8:34 PM. If you have any questions, ask your nurse or doctor.                CONTINUE taking these medications      acetaminophen 650 MG Tbsr  Commonly known as: TYLENOL  Take 1,300 mg by mouth 2 (two) times a day.     ascorbic acid (vitamin C) 1000 MG tablet  Commonly known as: VITAMIN C  Take 1,000 mg by mouth once daily.     azelastine 137 mcg (0.1 %) nasal spray  Commonly known as: ASTELIN  1 spray (137 mcg total) by Nasal route 2 (two) times daily.     biotin 10,000 mcg Tbdl  Take 1 tablet by mouth once daily.     blood sugar diagnostic Strp  Commonly known as: FREESTYLE LITE STRIPS  1 strip by Misc.(Non-Drug; Combo Route) route 3 (three) times daily.     blood-glucose meter kit  Use as instructed     calcium-vitamin D3 500 mg-5 mcg (200 unit) per tablet  Commonly known as: OS-KASSANDRA 500 + D3  Take 1 tablet by mouth once daily.     cranberry extract 200 mg Cap  Take 1 capsule by mouth once daily.     diclofenac sodium 1 % Gel  Commonly known as: VOLTAREN  Apply 2 g topically 2 (two) times daily. Apply to affected area as needed for pain.     donepeziL 10 MG tablet  Commonly known as: ARICEPT  TAKE 1  TABLET(10 MG) BY MOUTH EVERY EVENING     DULoxetine 30 MG capsule  Commonly known as: CYMBALTA  Take 1 capsule (30 mg total) by mouth 2 (two) times daily.     FREESTYLE EFREN 14 DAY READER Misc  Generic drug: flash glucose scanning reader  Check blood glucose level 3 times daily.     FREESTYLE EFREN 14 DAY SENSOR Kit  Generic drug: flash glucose sensor  APPLY 1 SENSOR EVERY 14 DAYS     gabapentin 300 MG capsule  Commonly known as: NEURONTIN  Take 1 to 2 capsules 3 times daily if needed for leg pain.     hydrocortisone 2.5 % cream  Apply topically 2 (two) times daily as needed.     lactulose 20 gram/30 mL Soln  Commonly known as: CHRONULAC  Take 30 mLs (20 g total) by mouth 3 (three) times daily as needed (Constipation).     lancets 28 gauge Misc  Commonly known as: FREESTYLE LANCETS  30 lancets by Misc.(Non-Drug; Combo Route) route Daily.     levothyroxine 75 MCG tablet  Commonly known as: SYNTHROID  TAKE 1 TABLET BY MOUTH BEFORE  BREAKFAST     LIDOcaine 5 %  Commonly known as: LIDODERM  APPLY 1 TO 3 PATCHES TO BACK EVERY DAY. REMOVE WITHIN 12 HOURS     magnesium 30 mg Tab  Take 30 mg by mouth once. Patient does not know actual dose in MG     magnesium citrate solution  Take 300 mLs by mouth.     metFORMIN 1000 MG tablet  Commonly known as: GLUCOPHAGE  TAKE 1 TABLET(1000 MG) BY MOUTH TWICE DAILY WITH MEALS     mometasone 0.1 % ointment  Commonly known as: ELOCON  Apply topically 2 (two) times a day. Mix 50/50 with mupirocin ointment. Apply in each nostril twice daily.     montelukast 10 mg tablet  Commonly known as: SINGULAIR  Take 1 tablet (10 mg total) by mouth once daily.     ospemifene 60 mg Tab  Commonly known as: OSPHENA  Take by mouth.     pantoprazole 20 MG tablet  Commonly known as: PROTONIX  TAKE 1 TABLET(20 MG) BY MOUTH TWICE DAILY     pregabalin 100 MG capsule  Commonly known as: LYRICA  Take 100 mg by mouth.     PREMARIN vaginal cream  Generic drug: conjugated estrogens  INSERT 0.5 GRAM VAGINALLY 2  TIMES A WEEK     rifAXIMin 550 mg Tab  Commonly known as: XIFAXAN  Take 1 tablet (550 mg total) by mouth 2 (two) times daily.     TRADJENTA 5 mg Tab tablet  Generic drug: linaGLIPtin  TAKE 1 TABLET(5 MG) BY MOUTH EVERY DAY     traZODone 50 MG tablet  Commonly known as: DESYREL  TAKE 1 TABLET(50 MG) BY MOUTH EVERY EVENING     triamcinolone acetonide 0.1% 0.1 % paste  Commonly known as: KENALOG  3 (three) times daily.     UBRELVY 100 mg tablet  Generic drug: ubrogepant  Take 1 tablet daily as needed for migraine headache. May take 1 additional tablet 2 hours later if needed.     valACYclovir 1000 MG tablet  Commonly known as: VALTREX  Take 1 tablet (1,000 mg total) by mouth once daily.              Medication Reconciliation:  Were medications changed on discharge? Yes  Were medications in the home? Yes  Is the patient taking the medications as directed? Yes  Does the patient understand the medications and changes? Yes  Does updated med list accurately reflects meds patient is currently taking? Yes    ENVIRONMENT OF CARE      Family and/or Caregiver present at visit?  No  Name of Caregiver:   History provided by: patient    Advance Care Planning   Advanced Care Planning Status:  Patient has had an ACP conversation  Living Will: Yes  Power of : Yes  [place]: No    Does Caregiver have HCPoA: No  Changes today:   Is patient hospice appropriate: No  (If needed, use PPS <30 or FAST score >7)  Was referral to hospice placed: No       Impression upon entering the home:  Physical Dwelling: apartment/condo   Appearance of home environment: cleaniness: clean  Functional Status: independent  Mobility: ambulatory with device  Nutritional access: available food but inadequate intake  Home Health: Yes,  Agency      DME/Supplies: none     Diagnostic tests reviewed/disposition: No diagnosic tests pending after this hospitalization.  Disease/illness education: Diabetes  Establishment or re-establishment of referral orders for  community resources: No other necessary community resources.   Discussion with other health care providers: No discussion with other health care providers necessary.   Does patient have a PCP at [place]? Yes   Repatriation plan with PCP? follow-up with PCP within 90d   Does patient have an ostomy (ileostomy, colostomy, suprapubic catheter, nephrostomy tube, tracheostomy, PEG tube, pleurex catheter, cholecystostomy, etc)? No  Were BPAs reviewed? Yes    Social History     Socioeconomic History    Marital status:    Tobacco Use    Smoking status: Never    Smokeless tobacco: Never   Substance and Sexual Activity    Alcohol use: Not Currently     Comment: socially, rarely    Drug use: Never    Sexual activity: Not Currently   Social History Narrative    , lives alone.  Primary support are her children and friends.     Social Drivers of Health     Financial Resource Strain: Low Risk  (3/30/2025)    Overall Financial Resource Strain (CARDIA)     Difficulty of Paying Living Expenses: Not hard at all   Food Insecurity: No Food Insecurity (3/30/2025)    Hunger Vital Sign     Worried About Running Out of Food in the Last Year: Never true     Ran Out of Food in the Last Year: Never true   Transportation Needs: No Transportation Needs (3/30/2025)    PRAPARE - Transportation     Lack of Transportation (Medical): No     Lack of Transportation (Non-Medical): No   Physical Activity: Inactive (3/30/2025)    Exercise Vital Sign     Days of Exercise per Week: 0 days     Minutes of Exercise per Session: 0 min   Stress: No Stress Concern Present (3/30/2025)    [employer] - Occupational Stress Questionnaire     Feeling of Stress : Not at all   Housing Stability: Low Risk  (3/30/2025)    Housing Stability Vital Sign     Unable to Pay for Housing in the Last Year: No     Homeless in the Last Year: No       OBJECTIVE:     Vital Signs:  Vitals:    04/08/25 1249   BP: 102/63   Pulse: 83   Resp: 17   Temp:  97.7 °F (36.5 °C)       Review of Systems   Constitutional:  Negative for chills and fever.   HENT:  Negative for congestion, rhinorrhea and trouble swallowing.    Eyes:  Negative for visual disturbance.   Respiratory:  Positive for shortness of breath. Negative for cough.    Cardiovascular:  Negative for chest pain.   Gastrointestinal:  Positive for abdominal pain, diarrhea and nausea. Negative for constipation and vomiting.   Endocrine: Positive for cold intolerance. Negative for heat intolerance.   Genitourinary:  Negative for difficulty urinating and dysuria.   Musculoskeletal:  Positive for arthralgias.        Left ankle    Allergic/Immunologic: Negative for environmental allergies.   Neurological:  Positive for tremors and weakness. Negative for dizziness and headaches.   Psychiatric/Behavioral:  Negative for behavioral problems, hallucinations and suicidal ideas.        Physical Exam:  Physical Exam  Constitutional:       General: She is not in acute distress.     Appearance: Normal appearance. She is normal weight.   HENT:      Head: Normocephalic and atraumatic.      Nose: No congestion or rhinorrhea.   Eyes:      General: No scleral icterus.  Cardiovascular:      Rate and Rhythm: Normal rate.   Pulmonary:      Effort: Pulmonary effort is normal. No respiratory distress.      Breath sounds: Normal breath sounds.   Abdominal:      General: Bowel sounds are normal. There is no distension.      Palpations: Abdomen is soft.      Tenderness: There is no abdominal tenderness.   Musculoskeletal:      Right lower leg: No edema.      Left lower leg: No edema.   Skin:     General: Skin is warm and dry.      Coloration: Skin is not jaundiced.   Neurological:      Mental Status: She is alert and oriented to person, place, and time.      Motor: Weakness present.   Psychiatric:         Mood and Affect: Mood normal.         Behavior: Behavior normal.         INSTRUCTIONS FOR PATIENT:     Scheduled Follow-up, Appts  Reviewed with Modifications if Needed: Yes  Future Appointments   Date Time Provider Department Center   5/14/2025  2:30 PM Jaqueline Espinoza MD [facility] HEPAT [facility]   5/20/2025  1:30 PM Darci Guthrie MD [facility] INTM [facility]     Take all medications as prescribed  Keep all follow-up appointments  Return to the hospital or call your primary care provider if any worsening symptoms such as fever, chest pain, shortness of breath, return of symptoms, or any other concerns.      Signature: KENNEDY Escobar    Transition of Care Visit:  I have reviewed and updated the history and problem list.  I have reconciled the medication list.  I have discussed the hospitalization and current medical issues, prognosis and plans with the patient/family.

## 2025-04-09 NOTE — ASSESSMENT & PLAN NOTE
Discussed with patient routine diabetic care that includes but are not limited to regular eye exams, daily foot exams, proper nutrition, regular BG monitoring at home (fasting and mealtime) and medication compliance in a diabetic.  Target morning BS  and meal-time BS <180.     Lab Results   Component Value Date    HGBA1C 5.7 (H) 03/29/2025      - Monitor FSG  - ADA diet   - continue Linagliptin   - F/U w/ PCP

## 2025-04-11 RX ORDER — MONTELUKAST SODIUM 10 MG/1
10 TABLET ORAL DAILY
Qty: 90 TABLET | Refills: 1 | Status: SHIPPED | OUTPATIENT
Start: 2025-04-11

## 2025-04-11 NOTE — TELEPHONE ENCOUNTER
No care due was identified.  [facility] Embedded Care Due Messages. Reference number: [id number].   4/11/2025 8:09:53 AM CDT

## 2025-04-11 NOTE — TELEPHONE ENCOUNTER
Anayeli Judd  is requesting a refill authorization.  Brief Assessment and Rationale for Refill:  Approve     [facility]      Extended chart review required: Yes   Comments:     Note composed:8:43 AM 04/11/2025

## 2025-04-15 ENCOUNTER — TELEPHONE (OUTPATIENT)
Dept: INTERNAL MEDICINE | Facility: CLINIC | Age: 80
End: 2025-04-15
Payer: MEDICARE

## 2025-04-15 DIAGNOSIS — K21.9 GASTROESOPHAGEAL REFLUX DISEASE, UNSPECIFIED WHETHER ESOPHAGITIS PRESENT: Chronic | ICD-10-CM

## 2025-04-15 RX ORDER — PANTOPRAZOLE SODIUM 40 MG/1
40 TABLET, DELAYED RELEASE ORAL 2 TIMES DAILY
Qty: 180 TABLET | Refills: 1 | Status: SHIPPED | OUTPATIENT
Start: 2025-04-15

## 2025-04-15 NOTE — TELEPHONE ENCOUNTER
Called and spoke to Danielle. I let her know that Dr. Guthrie increased Ms. Guadalupe's Protonix to to 40 mg twice a day. She verbalized understanding and will let her mom know.

## 2025-04-21 ENCOUNTER — TELEPHONE (OUTPATIENT)
Dept: INTERNAL MEDICINE | Facility: CLINIC | Age: 80
End: 2025-04-21
Payer: MEDICARE

## 2025-04-21 DIAGNOSIS — K59.00 CONSTIPATION, UNSPECIFIED CONSTIPATION TYPE: ICD-10-CM

## 2025-04-21 NOTE — TELEPHONE ENCOUNTER
Some abdominal discomfort still, lower abdomen and bowels still small and hard at times. Is having bm's though and no n/v. Used lactulose once this am. Discussed okay to use TID prn. Take another dose now.

## 2025-04-29 DIAGNOSIS — K59.00 CONSTIPATION, UNSPECIFIED CONSTIPATION TYPE: ICD-10-CM

## 2025-04-29 NOTE — TELEPHONE ENCOUNTER
Care Due:                  Date            Visit Type   Department     Provider  --------------------------------------------------------------------------------                                             [facility]                              \[facility]\""   HEALTH INTERNAL  Last Visit: 10-      PATIENT      MEDICINE       Darci Guthrie                              PHYSICAL -   [facility]                              [facility] INTERNAL  Next Visit: 05-      PATIENT      MEDICINE       Darci Guthrie                                                            Last  Test          Frequency    Reason                     Performed    Due Date  --------------------------------------------------------------------------------    TSH.........  12 months..  levothyroxine............  01- 01-    [facility] Embedded Care Due Messages. Reference number: [id number].   4/29/2025 3:50:53 PM CDT

## 2025-05-07 ENCOUNTER — PATIENT MESSAGE (OUTPATIENT)
Dept: INTERNAL MEDICINE | Facility: CLINIC | Age: 80
End: 2025-05-07
Payer: MEDICARE

## 2025-05-07 ENCOUNTER — LAB VISIT (OUTPATIENT)
Dept: LAB | Facility: HOSPITAL | Age: 80
End: 2025-05-07
Payer: MEDICARE

## 2025-05-07 DIAGNOSIS — R30.0 DYSURIA: Primary | ICD-10-CM

## 2025-05-07 DIAGNOSIS — R30.0 DYSURIA: ICD-10-CM

## 2025-05-07 LAB
BILIRUB UR QL STRIP.AUTO: NEGATIVE
CLARITY UR: CLEAR
COLOR UR AUTO: YELLOW
GLUCOSE UR QL STRIP: NEGATIVE
HGB UR QL STRIP: NEGATIVE
HOLD SPECIMEN: NORMAL
KETONES UR QL STRIP: NEGATIVE
LEUKOCYTE ESTERASE UR QL STRIP: NEGATIVE
NITRITE UR QL STRIP: NEGATIVE
PH UR STRIP: 5 [PH]
PROT UR QL STRIP: NEGATIVE
SP GR UR STRIP: 1.02
UROBILINOGEN UR STRIP-ACNC: NEGATIVE EU/DL

## 2025-05-07 PROCEDURE — 81003 URINALYSIS AUTO W/O SCOPE: CPT

## 2025-05-08 ENCOUNTER — RESULTS FOLLOW-UP (OUTPATIENT)
Dept: INTERNAL MEDICINE | Facility: CLINIC | Age: 80
End: 2025-05-08

## 2025-05-14 ENCOUNTER — OFFICE VISIT (OUTPATIENT)
Dept: HEPATOLOGY | Facility: CLINIC | Age: 80
End: 2025-05-14
Payer: MEDICARE

## 2025-05-14 VITALS
OXYGEN SATURATION: 100 % | HEART RATE: 91 BPM | BODY MASS INDEX: 24.22 KG/M2 | TEMPERATURE: 98 F | DIASTOLIC BLOOD PRESSURE: 57 MMHG | RESPIRATION RATE: 18 BRPM | SYSTOLIC BLOOD PRESSURE: 113 MMHG | HEIGHT: 62 IN | WEIGHT: 131.63 LBS

## 2025-05-14 DIAGNOSIS — I85.10 SECONDARY ESOPHAGEAL VARICES WITHOUT BLEEDING: ICD-10-CM

## 2025-05-14 DIAGNOSIS — Z87.898 HISTORY OF ALCOHOL USE: ICD-10-CM

## 2025-05-14 DIAGNOSIS — F10.90 METALD: ICD-10-CM

## 2025-05-14 DIAGNOSIS — K72.91 HEPATIC COMA/ENCEPHALOPATHY: ICD-10-CM

## 2025-05-14 DIAGNOSIS — R41.89 BRAIN FOG: ICD-10-CM

## 2025-05-14 DIAGNOSIS — K76.0 METALD: ICD-10-CM

## 2025-05-14 DIAGNOSIS — R52 DIFFUSE PAIN: ICD-10-CM

## 2025-05-14 DIAGNOSIS — K76.6 PORTAL HYPERTENSION: ICD-10-CM

## 2025-05-14 DIAGNOSIS — K74.69 COMPENSATED LIVER DISEASE: ICD-10-CM

## 2025-05-14 PROCEDURE — 99215 OFFICE O/P EST HI 40 MIN: CPT | Mod: PBBFAC | Performed by: INTERNAL MEDICINE

## 2025-05-14 PROCEDURE — 99999 PR PBB SHADOW E&M-EST. PATIENT-LVL V: CPT | Mod: PBBFAC,,, | Performed by: INTERNAL MEDICINE

## 2025-05-14 PROCEDURE — 99215 OFFICE O/P EST HI 40 MIN: CPT | Mod: S$PBB,,, | Performed by: INTERNAL MEDICINE

## 2025-05-14 RX ORDER — NAPROXEN 375 MG/1
375 TABLET ORAL 2 TIMES DAILY WITH MEALS
COMMUNITY

## 2025-05-14 RX ORDER — NITROFURANTOIN 25; 75 MG/1; MG/1
100 CAPSULE ORAL 2 TIMES DAILY
COMMUNITY
Start: 2025-03-20

## 2025-05-14 NOTE — PROGRESS NOTES
Hepatology Follow Up Note    Referring provider: No ref. provider found  PCP: Darci Guthrie MD    Chief complaint: follow up compensated Met-ALD cirrhosis     Last seen in clinic on: 10/8/2024    Brief HPI:  Anayeli Judd is a 80 y.o. female with compensated MetALD cirrhosis complicated by non-bleeding esophageal varices, DM Type 2, HLD, who presents for scheduled follow up.    Interval Events:  - Feels ok. Accompanied by caretaker, Mirian.   - Reports brain fog, slowed thought process, somnolence. Unable to tolerate lactulose due to diarrhea. Taking rifaximin BID.  - Continues to struggle with multiple symptoms, including severe vaginal pain, back pain, jaw pain, temple pain, gum swelling/pain...  - The patient denies abdominal distension, jaundice, gross GI bleeding.  - Denies recent hospitalizations, ED visits.  - This is the extent of the patient's complaints at this time.    Past Medical History:   Diagnosis Date    Abdominal pain     Allergic rhinitis     Arthritis     Blood platelet disorder     Blood platelet disorder     Blood transfusion     during delivery and     Bowel obstruction     Cervical radiculopathy     followed by dr cloud    Cirrhosis of liver     Colon cancer     transverse colon; resected; Stage IIA (pT3 pN0 MX)    Degenerative joint disease (DJD) of lumbar spine     Diabetes mellitus     Diarrhea     Falls 2020    Family history of breast cancer     Family history of colon cancer     Fatty liver     GERD (gastroesophageal reflux disease)     History of shingles     Hyperlipidemia     Hypomagnesemia     Hypothyroidism     Irritable bowel syndrome     Microscopic colitis     treated     Migraine     Myelodysplastic syndrome     Raynaud phenomenon     Raynaud's disease     Squamous cell carcinoma of skin     Type 2 diabetes mellitus     Usual hyperplasia of lactiferous duct        Past Surgical History:   Procedure Laterality Date    ADENOIDECTOMY       APPENDECTOMY      BACK SURGERY      BREAST BIOPSY  1970    BREAST CYST EXCISION  1970?    BREAST LUMPECTOMY  1970    CARPAL TUNNEL RELEASE      bilateral      SECTION      CHOLECYSTECTOMY  1965    COLECTOMY  2011    Transverse colon resection by Dr. Aguirre    COLONOSCOPY N/A 2017    Procedure: COLONOSCOPY;  Surgeon: Manjit Alvarez MD;  Location: [facility] (4TH FLR);  Service: Endoscopy;  Laterality: N/A;    COLONOSCOPY N/A 2019    Procedure: COLONOSCOPY;  Surgeon: Ramiro Jefferson MD;  Location: [facility] (4TH FLR);  Service: Endoscopy;  Laterality: N/A;  PM prep    COLONOSCOPY N/A 2022    Procedure: COLONOSCOPY;  Surgeon: Ramiro Jefferson MD;  Location: [facility] (2ND FLR);  Service: Endoscopy;  Laterality: N/A;  EGD and colonoscopy in 6-8 weeks from now     states has constipation. -extended Golytely prep    CYSTOSCOPY N/A 2021    Procedure: CYSTOSCOPY;  Surgeon: Viridiana Valenzuela MD;  Location: [facility] 1ST FLR;  Service: Urology;  Laterality: N/A;    ENDOSCOPIC ULTRASOUND OF UPPER GASTROINTESTINAL TRACT N/A 2018    Procedure: ULTRASOUND, UPPER GI TRACT, ENDOSCOPIC;  Surgeon: Jose Hess MD;  Location: [facility];  Service: Endoscopy;  Laterality: N/A;    ENDOSCOPIC ULTRASOUND OF UPPER GASTROINTESTINAL TRACT N/A 2019    Procedure: ULTRASOUND, UPPER GI TRACT, ENDOSCOPIC;  Surgeon: Jose Hess MD;  Location: [facility] (2ND FLR);  Service: Endoscopy;  Laterality: N/A;    ENDOSCOPIC ULTRASOUND OF UPPER GASTROINTESTINAL TRACT N/A 10/25/2023    Procedure: ULTRASOUND, UPPER GI TRACT, ENDOSCOPIC;  Surgeon: Jose Campos MD;  Location: [facility];  Service: Endoscopy;  Laterality: N/A;  10/20/23: instructions sent via portal-GD    ESOPHAGOGASTRODUODENOSCOPY N/A 2018    Procedure: EGD (ESOPHAGOGASTRODUODENOSCOPY);  Surgeon: Angelo Reynolds MD;  Location: [facility] (2ND FLR);  Service: Endoscopy;  Laterality: N/A;    ESOPHAGOGASTRODUODENOSCOPY N/A  06/26/2019    Procedure: EGD (ESOPHAGOGASTRODUODENOSCOPY);  Surgeon: Ramiro Jefferson MD;  Location: [facility] (4TH FLR);  Service: Endoscopy;  Laterality: N/A;    ESOPHAGOGASTRODUODENOSCOPY N/A 05/04/2022    Procedure: EGD (ESOPHAGOGASTRODUODENOSCOPY);  Surgeon: Ramiro Jefferson MD;  Location: [facility] (2ND FLR);  Service: Endoscopy;  Laterality: N/A;  fully vaccinated-GT    ESOPHAGOGASTRODUODENOSCOPY N/A 10/25/2023    Procedure: EGD (ESOPHAGOGASTRODUODENOSCOPY);  Surgeon: Jose Campos MD;  Location: [facility];  Service: Endoscopy;  Laterality: N/A;    ESOPHAGOGASTRODUODENOSCOPY N/A 11/15/2023    Procedure: ESOPHAGOGASTRODUODENOSCOPY (EGD);  Surgeon: Db Sanchez MD;  Location: [facility] (4TH FLR);  Service: Endoscopy;  Laterality: N/A;  New diagnosis of esophageal varices. Not a candidate for beta blocker due to soft blood pressures. Recommend EV banding.  PT/INR done on 11/3/23 and CBC done on 9/30/23  ok per Dr. Espinoza to be done by 1st available provider-see tele encounter-st  1    EYE SURGERY      Cataract Removal    FLUOROSCOPIC URODYNAMIC STUDY N/A 11/09/2021    Procedure: URODYNAMIC STUDY, FLUOROSCOPIC;  Surgeon: Viridiana Valenzuela MD;  Location: [facility] OR 1ST FLR;  Service: Urology;  Laterality: N/A;  90 minutes     HYSTERECTOMY      JOINT REPLACEMENT      OOPHORECTOMY  1970    posterolateral fusion with autograft bone and Eun mineralized bone matrix  02/01/2013    at [facility] for lumbar spine stenosis    SMALL INTESTINE SURGERY      SPINE SURGERY      TOE SURGERY      TONSILLECTOMY      TRANSFORAMINAL EPIDURAL INJECTION OF STEROID Bilateral 12/21/2022    Procedure: Injection,steroid,epidural, Todd L5/S1 TF CONTRAST DIRECT REFERRAL;  Surgeon: Toni Osorio MD;  Location: [facility] PAIN MGT;  Service: Pain Management;  Laterality: Bilateral;    TRIGGER FINGER RELEASE         Family History   Problem Relation Name Age of Onset    Heart disease Father August 50        Mi age 50    Colon cancer Father  August     Bladder Cancer Mother Mariana         non smoker    Cataracts Mother Mariana     Glaucoma Mother Mariana     Heart disease Mother Mariana     Hyperlipidemia Mother Mariana     Kidney disease Mother Mariana     Breast cancer Sister . Damaris Dye 79    Arthritis Daughter Wendy     Asthma Daughter Wendy     Depression Daughter Wendy     Hypertension Daughter Wendy     Stroke Daughter Wendy 40    Arthritis Brother Timoteo     Colon cancer Brother Timoteo 70    Alcohol abuse Brother Timoteo     Parkinsonism Brother Ronnie     Alcohol abuse Brother Ronnie     Depression Daughter Danielle     Stroke Daughter Danielle     Alcohol abuse Brother Harjeet     Arthritis Brother Harjeet     Asthma Daughter Kimberly     Depression Daughter Kimberly     Celiac disease Neg Hx      Cirrhosis Neg Hx      Colon polyps Neg Hx      Crohn's disease Neg Hx      Cystic fibrosis Neg Hx      Esophageal cancer Neg Hx      Hemochromatosis Neg Hx      Inflammatory bowel disease Neg Hx      Irritable bowel syndrome Neg Hx      Liver cancer Neg Hx      Liver disease Neg Hx      Rectal cancer Neg Hx      Stomach cancer Neg Hx      Ulcerative colitis Neg Hx      Eliazar's disease Neg Hx      Amblyopia Neg Hx      Blindness Neg Hx      Macular degeneration Neg Hx      Retinal detachment Neg Hx      Strabismus Neg Hx      Melanoma Neg Hx         Social History     Tobacco Use    Smoking status: Never    Smokeless tobacco: Never   Substance Use Topics    Alcohol use: Not Currently     Comment: socially, rarely    Drug use: Never       Current Outpatient Medications   Medication Sig Dispense Refill    acetaminophen (TYLENOL) 650 MG TbSR Take 1,300 mg by mouth 2 (two) times a day.      ascorbic acid, vitamin C, (VITAMIN C) 1000 MG tablet Take 1,000 mg by mouth once daily.      azelastine (ASTELIN) 137 mcg (0.1 %) nasal spray 1 spray (137 mcg total) by Nasal route 2 (two) times daily. 30 mL 11    biotin 10,000 mcg TbDL Take 1 tablet by mouth once daily.       blood  sugar diagnostic (FREESTYLE LITE STRIPS) Strp 1 strip by Misc.(Non-Drug; Combo Route) route 3 (three) times daily. 200 strip 5    calcium-vitamin D3 (OS-KASSANDRA 500 + D3) 500 mg-5 mcg (200 unit) per tablet Take 1 tablet by mouth once daily.      cranberry extract 200 mg Cap Take 1 capsule by mouth once daily.      diclofenac sodium (VOLTAREN) 1 % Gel Apply 2 g topically 2 (two) times daily. Apply to affected area as needed for pain. 100 g 3    donepeziL (ARICEPT) 10 MG tablet TAKE 1 TABLET(10 MG) BY MOUTH EVERY EVENING 90 tablet 3    DULoxetine (CYMBALTA) 30 MG capsule Take 1 capsule (30 mg total) by mouth 2 (two) times daily. 180 capsule 3    flash glucose scanning reader (FREESTYLE EFREN 14 DAY READER) Misc Check blood glucose level 3 times daily. 1 each 0    flash glucose sensor (FREESTYLE EFREN 14 DAY SENSOR) Kit APPLY 1 SENSOR EVERY 14 DAYS 6 kit 3    gabapentin (NEURONTIN) 300 MG capsule Take 1 to 2 capsules 3 times daily if needed for leg pain. 180 capsule 5    hydrocortisone 2.5 % cream Apply topically 2 (two) times daily as needed. 1 each 1    lactulose (CHRONULAC) 20 gram/30 mL Soln Take 30 mLs (20 g total) by mouth 3 (three) times daily as needed (Constipation). 300 mL 0    lancets (FREESTYLE LANCETS) 28 gauge Misc 30 lancets by Misc.(Non-Drug; Combo Route) route Daily. 30 each 3    levothyroxine (SYNTHROID) 75 MCG tablet TAKE 1 TABLET BY MOUTH BEFORE  BREAKFAST 90 tablet 3    LIDOcaine (LIDODERM) 5 % APPLY 1 TO 3 PATCHES TO BACK EVERY DAY. REMOVE WITHIN 12 HOURS 30 patch 2    linaGLIPtin (TRADJENTA) 5 mg Tab tablet TAKE 1 TABLET(5 MG) BY MOUTH EVERY DAY 90 tablet 3    magnesium 30 mg Tab Take 30 mg by mouth once. Patient does not know actual dose in MG      magnesium citrate solution Take 300 mLs by mouth.      metFORMIN (GLUCOPHAGE) 1000 MG tablet TAKE 1 TABLET(1000 MG) BY MOUTH TWICE DAILY WITH MEALS 180 tablet 3    mometasone (ELOCON) 0.1 % ointment Apply topically 2 (two) times a day. Mix 50/50 with  "mupirocin ointment. Apply in each nostril twice daily. 15 g 1    montelukast (SINGULAIR) 10 mg tablet Take 1 tablet (10 mg total) by mouth once daily. 90 tablet 1    naproxen (EC-NAPROSYN) 375 MG TbEC EC tablet Take 375 mg by mouth 2 (two) times daily with meals.      nitrofurantoin, macrocrystal-monohydrate, (MACROBID) 100 MG capsule Take 100 mg by mouth 2 (two) times daily.      ospemifene (OSPHENA) 60 mg Tab Take by mouth.      pantoprazole (PROTONIX) 40 MG tablet Take 1 tablet (40 mg total) by mouth 2 (two) times daily. 180 tablet 1    pregabalin (LYRICA) 100 MG capsule Take 100 mg by mouth.      PREMARIN vaginal cream INSERT 0.5 GRAM VAGINALLY 2 TIMES A WEEK 30 g 3    rifAXIMin (XIFAXAN) 550 mg Tab Take 1 tablet (550 mg total) by mouth 2 (two) times daily. 60 tablet 11    traZODone (DESYREL) 50 MG tablet TAKE 1 TABLET(50 MG) BY MOUTH EVERY EVENING 90 tablet 3    triamcinolone acetonide 0.1% (KENALOG) 0.1 % paste 3 (three) times daily.      ubrogepant (UBRELVY) 100 mg tablet Take 1 tablet daily as needed for migraine headache. May take 1 additional tablet 2 hours later if needed. 16 tablet 2    valACYclovir (VALTREX) 1000 MG tablet Take 1 tablet (1,000 mg total) by mouth once daily. 90 tablet 1    blood-glucose meter kit Use as instructed 1 each 0     No current facility-administered medications for this visit.       Review of patient's allergies indicates:   Allergen Reactions    Codeine Itching and Nausea And Vomiting    Dilaudid [hydromorphone (bulk)] Other (See Comments)     Oversedating, head burning. Pt prefers to avoid.       Percocet [oxycodone-acetaminophen] Itching    Sulfa (sulfonamide antibiotics) Itching and Nausea And Vomiting    Dilaudid [hydromorphone]     Latex, natural rubber Rash    Opioids - morphine analogues Rash            Vitals:    05/14/25 1410   BP: (!) 113/57   Pulse: 91   Resp: 18   Temp: 97.8 °F (36.6 °C)   TempSrc: Oral   SpO2: 100%   Weight: 59.7 kg (131 lb 9.8 oz)   Height: 5' 2" " (1.575 m)   Body mass index is 24.07 kg/m².    Physical Exam  Constitutional:       General: She is not in acute distress.  Eyes:      General: No scleral icterus.  Cardiovascular:      Rate and Rhythm: Normal rate.   Pulmonary:      Effort: Pulmonary effort is normal.   Abdominal:      General: Abdomen is flat. There is no distension.      Palpations: There is no fluid wave.      Tenderness: There is abdominal tenderness in the epigastric area.   Musculoskeletal:         General: No swelling.   Skin:     General: Skin is warm.      Coloration: Skin is not jaundiced.   Neurological:      General: No focal deficit present.      Mental Status: She is alert.      Comments: No asterixis.    Psychiatric:         Thought Content: Thought content normal.       LABS: I personally reviewed pertinent laboratory findings.    Lab Results   Component Value Date    WBC 3.64 (L) 03/31/2025    HGB 10.1 (L) 03/31/2025    HCT 29.9 (L) 03/31/2025     (H) 03/31/2025    PLT 63 (L) 03/31/2025       Lab Results   Component Value Date     03/31/2025    K 3.7 03/31/2025     03/31/2025    CO2 23 03/31/2025    BUN 7 (L) 03/31/2025    CREATININE 0.8 03/31/2025    CALCIUM 8.9 03/31/2025    ANIONGAP 10 03/31/2025    ESTGFRAFRICA >60.0 06/11/2022    EGFRNONAA >60.0 06/11/2022       Lab Results   Component Value Date    ALT 47 (H) 03/31/2025    AST 45 03/31/2025    GGT 72 (H) 11/03/2023    ALKPHOS 95 03/31/2025    BILITOT 1.1 (H) 03/31/2025       Lab Results   Component Value Date    HEPAIGM Negative 09/09/2020    HEPBIGM Negative 09/09/2020    HEPBCAB Negative 09/09/2020    HEPCAB Non-Reactive 03/28/2025       Lab Results   Component Value Date    TOSHIA Negative <1:160 07/31/2014      Lab Results   Component Value Date    AFP 4.4 10/08/2024       MELD 3.0: 9 at 2/10/2025  3:11 PM  MELD-Na: 7 at 2/10/2025  3:11 PM  Calculated from:  Serum Creatinine: 1 mg/dL at 2/10/2025  3:11 PM  Serum Sodium: 136 mmol/L at 2/10/2025  3:11  "PM  Total Bilirubin: 0.9 mg/dL (Using min of 1 mg/dL) at 2/10/2025  3:11 PM  Serum Albumin: 4 g/dL (Using max of 3.5 g/dL) at 2/10/2025  3:11 PM  INR(ratio): 1.1 at 2/10/2025  3:11 PM  Age at listing (hypothetical): 80 years  Sex: Female at 2/10/2025  3:11 PM       IMAGING: I personally reviewed imaging studies.    Assessment: Anayeli Judd is a 80 y.o. female with compensated MetALD cirrhosis complicated by non-bleeding esophageal varices, DM Type 2, HLD, who presents for scheduled follow up.    1. Compensated liver disease    2. MetALD    3. Portal hypertension    4. Secondary esophageal varices without bleeding    5. Hepatic coma/encephalopathy    6. Brain fog    7. History of alcohol use    8. Diffuse pain      #Compensated MetALD Cirrhosis    Volume: No an active issue.  Infection: No concern at this time.   Bleeding: No history of EVB. Last EGD large EV but unable to complete band ligation due to inability to intubate esophagus with  (11/15/23). I have discussed with NSBB with patient and family -> decided to defer NSBB to avoid hypotension / falls.   Encephalopathy: Patient endorses worsening brain fog, somnolence, forgetfulness. Unclear if 2/2 HE, but will treat as HE. Unable to tolerate lactulose due to chronic diarrhea. Continue rifaximin 550mg BID.  Screening: AFP 4.4 10/8/24. CT abd with contrast w/o HCC 3/28/25. AFP and liver US q6 months.  Immunization: HAV immune. Recommend HBV vaccination.  Transplant: Low MELD. Compensated disease. Barrier includes advanced age.     #Diffuse Pain: continues to struggle with multiple symptoms, including severe vaginal pain, back pain, jaw pain, temple pain, gum swelling/pain. She states she is tired of taking so many medications, and sometimes thinks about stopping medications and "leaving it up to God." I have introduced the concept of palliative care and hospice. I offered a referral to [facility] for symptom management and GOC discussions. Patient is not " opposed to Pall Care, but would like to discuss with family.     Return to clinic in 6 months.    Jaqueline Espinoza MD  Staff Physician  Hepatology and Liver Transplant  [facility]  [facility]

## 2025-05-20 ENCOUNTER — OFFICE VISIT (OUTPATIENT)
Dept: INTERNAL MEDICINE | Facility: CLINIC | Age: 80
End: 2025-05-20
Payer: MEDICARE

## 2025-05-20 ENCOUNTER — CLINICAL SUPPORT (OUTPATIENT)
Dept: INTERNAL MEDICINE | Facility: CLINIC | Age: 80
End: 2025-05-20
Attending: INTERNAL MEDICINE
Payer: MEDICARE

## 2025-05-20 VITALS
DIASTOLIC BLOOD PRESSURE: 64 MMHG | BODY MASS INDEX: 24.24 KG/M2 | SYSTOLIC BLOOD PRESSURE: 113 MMHG | HEIGHT: 62 IN | HEART RATE: 82 BPM | WEIGHT: 131.75 LBS

## 2025-05-20 DIAGNOSIS — E53.8 VITAMIN B12 DEFICIENCY: ICD-10-CM

## 2025-05-20 DIAGNOSIS — K74.69 COMPENSATED LIVER DISEASE: ICD-10-CM

## 2025-05-20 DIAGNOSIS — R04.0 EPISTAXIS: ICD-10-CM

## 2025-05-20 DIAGNOSIS — K76.0 METALD: ICD-10-CM

## 2025-05-20 DIAGNOSIS — D46.9 MYELODYSPLASTIC SYNDROME: ICD-10-CM

## 2025-05-20 DIAGNOSIS — K76.9 LIVER DISEASE, UNSPECIFIED: ICD-10-CM

## 2025-05-20 DIAGNOSIS — Z87.898 HISTORY OF ALCOHOL USE: ICD-10-CM

## 2025-05-20 DIAGNOSIS — S80.212A ABRASION, LEFT KNEE, INITIAL ENCOUNTER: ICD-10-CM

## 2025-05-20 DIAGNOSIS — I85.10 SECONDARY ESOPHAGEAL VARICES WITHOUT BLEEDING: ICD-10-CM

## 2025-05-20 DIAGNOSIS — M54.9 BACK PAIN, UNSPECIFIED BACK LOCATION, UNSPECIFIED BACK PAIN LATERALITY, UNSPECIFIED CHRONICITY: ICD-10-CM

## 2025-05-20 DIAGNOSIS — H92.02 CHRONIC LEFT EAR PAIN: ICD-10-CM

## 2025-05-20 DIAGNOSIS — M53.3 CHRONIC LEFT SI JOINT PAIN: ICD-10-CM

## 2025-05-20 DIAGNOSIS — G89.29 CHRONIC LEFT SI JOINT PAIN: ICD-10-CM

## 2025-05-20 DIAGNOSIS — K76.6 PORTAL HYPERTENSION: ICD-10-CM

## 2025-05-20 DIAGNOSIS — G89.29 CHRONIC LEFT EAR PAIN: ICD-10-CM

## 2025-05-20 DIAGNOSIS — E11.9 TYPE 2 DIABETES MELLITUS WITHOUT COMPLICATION, WITHOUT LONG-TERM CURRENT USE OF INSULIN: ICD-10-CM

## 2025-05-20 DIAGNOSIS — E11.9 TYPE 2 DIABETES MELLITUS WITHOUT COMPLICATION, WITHOUT LONG-TERM CURRENT USE OF INSULIN: Chronic | ICD-10-CM

## 2025-05-20 DIAGNOSIS — R41.89 BRAIN FOG: ICD-10-CM

## 2025-05-20 DIAGNOSIS — Z23 NEED FOR DIPHTHERIA-TETANUS-PERTUSSIS (TDAP) VACCINE: ICD-10-CM

## 2025-05-20 DIAGNOSIS — F10.90 METALD: ICD-10-CM

## 2025-05-20 DIAGNOSIS — M79.10 MYALGIA: ICD-10-CM

## 2025-05-20 DIAGNOSIS — D50.0 IRON DEFICIENCY ANEMIA DUE TO CHRONIC BLOOD LOSS: ICD-10-CM

## 2025-05-20 DIAGNOSIS — Z00.00 ENCOUNTER FOR ANNUAL HEALTH EXAMINATION: Primary | ICD-10-CM

## 2025-05-20 DIAGNOSIS — K72.91 HEPATIC COMA/ENCEPHALOPATHY: ICD-10-CM

## 2025-05-20 DIAGNOSIS — M70.62 GREATER TROCHANTERIC BURSITIS OF LEFT HIP: ICD-10-CM

## 2025-05-20 LAB
ABSOLUTE EOSINOPHIL (OHS): 0.21 K/UL
ABSOLUTE MONOCYTE (OHS): 0.39 K/UL (ref 0.3–1)
ABSOLUTE NEUTROPHIL COUNT (OHS): 3.11 K/UL (ref 1.8–7.7)
AFP SERPL-MCNC: 3.8 NG/ML
ALBUMIN SERPL BCP-MCNC: 3.5 G/DL (ref 3.5–5.2)
ALBUMIN SERPL BCP-MCNC: 3.5 G/DL (ref 3.5–5.2)
ALP SERPL-CCNC: 71 UNIT/L (ref 40–150)
ALP SERPL-CCNC: 72 UNIT/L (ref 40–150)
ALT SERPL W/O P-5'-P-CCNC: 18 UNIT/L (ref 10–44)
ALT SERPL W/O P-5'-P-CCNC: 21 UNIT/L (ref 10–44)
ANION GAP (OHS): 12 MMOL/L (ref 8–16)
AST SERPL-CCNC: 24 UNIT/L (ref 11–45)
AST SERPL-CCNC: 24 UNIT/L (ref 11–45)
BASOPHILS # BLD AUTO: 0.03 K/UL
BASOPHILS NFR BLD AUTO: 0.6 %
BILIRUB DIRECT SERPL-MCNC: 0.3 MG/DL (ref 0.1–0.3)
BILIRUB DIRECT SERPL-MCNC: 0.3 MG/DL (ref 0.1–0.3)
BILIRUB SERPL-MCNC: 0.7 MG/DL (ref 0.1–1)
BILIRUB SERPL-MCNC: 0.7 MG/DL (ref 0.1–1)
BUN SERPL-MCNC: 13 MG/DL (ref 8–23)
CALCIUM SERPL-MCNC: 9.6 MG/DL (ref 8.7–10.5)
CHLORIDE SERPL-SCNC: 104 MMOL/L (ref 95–110)
CHOLEST SERPL-MCNC: 186 MG/DL (ref 120–199)
CHOLEST/HDLC SERPL: 3 {RATIO} (ref 2–5)
CK SERPL-CCNC: 49 U/L (ref 20–180)
CO2 SERPL-SCNC: 21 MMOL/L (ref 23–29)
CREAT SERPL-MCNC: 1.1 MG/DL (ref 0.5–1.4)
EAG (OHS): 128 MG/DL (ref 68–131)
ERYTHROCYTE [DISTWIDTH] IN BLOOD BY AUTOMATED COUNT: 14 % (ref 11.5–14.5)
FERRITIN SERPL-MCNC: 155 NG/ML (ref 20–300)
FOLATE SERPL-MCNC: 6.4 NG/ML (ref 4–24)
GFR SERPLBLD CREATININE-BSD FMLA CKD-EPI: 50 ML/MIN/1.73/M2
GLUCOSE SERPL-MCNC: 165 MG/DL (ref 70–110)
HBA1C MFR BLD: 6.1 % (ref 4–5.6)
HCT VFR BLD AUTO: 31.2 % (ref 37–48.5)
HDLC SERPL-MCNC: 62 MG/DL (ref 40–75)
HDLC SERPL: 33.3 % (ref 20–50)
HGB BLD-MCNC: 10.2 GM/DL (ref 12–16)
IMM GRANULOCYTES # BLD AUTO: 0.01 K/UL (ref 0–0.04)
IMM GRANULOCYTES NFR BLD AUTO: 0.2 % (ref 0–0.5)
INR PPP: 1.1 (ref 0.8–1.2)
IRON SATN MFR SERPL: 29 % (ref 20–50)
IRON SERPL-MCNC: 115 UG/DL (ref 30–160)
LDLC SERPL CALC-MCNC: 92.8 MG/DL (ref 63–159)
LYMPHOCYTES # BLD AUTO: 1.4 K/UL (ref 1–4.8)
MAGNESIUM SERPL-MCNC: 1.1 MG/DL (ref 1.6–2.6)
MCH RBC QN AUTO: 34.3 PG (ref 27–31)
MCHC RBC AUTO-ENTMCNC: 32.7 G/DL (ref 32–36)
MCV RBC AUTO: 105 FL (ref 82–98)
NONHDLC SERPL-MCNC: 124 MG/DL
NUCLEATED RBC (/100WBC) (OHS): 0 /100 WBC
PLATELET # BLD AUTO: 76 K/UL (ref 150–450)
PMV BLD AUTO: 11 FL (ref 9.2–12.9)
POTASSIUM SERPL-SCNC: 4.1 MMOL/L (ref 3.5–5.1)
PROT SERPL-MCNC: 6.6 GM/DL (ref 6–8.4)
PROT SERPL-MCNC: 6.7 GM/DL (ref 6–8.4)
PROTHROMBIN TIME: 11.7 SECONDS (ref 9–12.5)
RBC # BLD AUTO: 2.97 M/UL (ref 4–5.4)
RELATIVE EOSINOPHIL (OHS): 4.1 %
RELATIVE LYMPHOCYTE (OHS): 27.2 % (ref 18–48)
RELATIVE MONOCYTE (OHS): 7.6 % (ref 4–15)
RELATIVE NEUTROPHIL (OHS): 60.3 % (ref 38–73)
SODIUM SERPL-SCNC: 137 MMOL/L (ref 136–145)
TIBC SERPL-MCNC: 395 UG/DL (ref 250–450)
TRANSFERRIN SERPL-MCNC: 267 MG/DL (ref 200–375)
TRIGL SERPL-MCNC: 156 MG/DL (ref 30–150)
TSH SERPL-ACNC: 2.98 UIU/ML (ref 0.4–4)
VIT B12 SERPL-MCNC: 319 PG/ML (ref 210–950)
WBC # BLD AUTO: 5.15 K/UL (ref 3.9–12.7)

## 2025-05-20 PROCEDURE — 83735 ASSAY OF MAGNESIUM: CPT

## 2025-05-20 PROCEDURE — 99214 OFFICE O/P EST MOD 30 MIN: CPT | Mod: S$PBB,,, | Performed by: INTERNAL MEDICINE

## 2025-05-20 PROCEDURE — 82550 ASSAY OF CK (CPK): CPT

## 2025-05-20 PROCEDURE — 82607 VITAMIN B-12: CPT

## 2025-05-20 PROCEDURE — 82105 ALPHA-FETOPROTEIN SERUM: CPT

## 2025-05-20 PROCEDURE — 99999PBSHW PR PBB SHADOW TECHNICAL ONLY FILED TO HB: Mod: PBBFAC,,,

## 2025-05-20 PROCEDURE — 80061 LIPID PANEL: CPT

## 2025-05-20 PROCEDURE — 84466 ASSAY OF TRANSFERRIN: CPT

## 2025-05-20 PROCEDURE — 90471 IMMUNIZATION ADMIN: CPT | Mod: PBBFAC

## 2025-05-20 PROCEDURE — 99213 OFFICE O/P EST LOW 20 MIN: CPT | Mod: PBBFAC | Performed by: INTERNAL MEDICINE

## 2025-05-20 PROCEDURE — 84443 ASSAY THYROID STIM HORMONE: CPT

## 2025-05-20 PROCEDURE — 83036 HEMOGLOBIN GLYCOSYLATED A1C: CPT

## 2025-05-20 PROCEDURE — 85610 PROTHROMBIN TIME: CPT

## 2025-05-20 PROCEDURE — 85025 COMPLETE CBC W/AUTO DIFF WBC: CPT

## 2025-05-20 PROCEDURE — 82728 ASSAY OF FERRITIN: CPT

## 2025-05-20 PROCEDURE — 90715 TDAP VACCINE 7 YRS/> IM: CPT | Mod: PBBFAC

## 2025-05-20 PROCEDURE — 82040 ASSAY OF SERUM ALBUMIN: CPT

## 2025-05-20 PROCEDURE — 82248 BILIRUBIN DIRECT: CPT

## 2025-05-20 PROCEDURE — 99999 PR PBB SHADOW E&M-EST. PATIENT-LVL III: CPT | Mod: PBBFAC,,, | Performed by: INTERNAL MEDICINE

## 2025-05-20 PROCEDURE — 82746 ASSAY OF FOLIC ACID SERUM: CPT

## 2025-05-20 RX ORDER — LIDOCAINE 50 MG/G
PATCH TOPICAL
Qty: 30 PATCH | Refills: 2 | Status: SHIPPED | OUTPATIENT
Start: 2025-05-20

## 2025-05-20 RX ADMIN — CLOSTRIDIUM TETANI TOXOID ANTIGEN (FORMALDEHYDE INACTIVATED), CORYNEBACTERIUM DIPHTHERIAE TOXOID ANTIGEN (FORMALDEHYDE INACTIVATED), BORDETELLA PERTUSSIS TOXOID ANTIGEN (GLUTARALDEHYDE INACTIVATED), BORDETELLA PERTUSSIS FILAMENTOUS HEMAGGLUTININ ANTIGEN (FORMALDEHYDE INACTIVATED), BORDETELLA PERTUSSIS PERTACTIN ANTIGEN, AND BORDETELLA PERTUSSIS FIMBRIAE 2/3 ANTIGEN 0.5 ML: 5; 2; 2.5; 5; 3; 5 INJECTION, SUSPENSION INTRAMUSCULAR at 02:05

## 2025-05-20 NOTE — TELEPHONE ENCOUNTER
No care due was identified.  [facility] Embedded Care Due Messages. Reference number: [id number].   5/20/2025 3:19:32 AM CDT

## 2025-05-20 NOTE — TELEPHONE ENCOUNTER
Refill Routing Note   Medication(s) are not appropriate for processing by [facility] for the following reason(s):        Outside of protocol    [facility] action(s):  Route             Appointments  past 12m or future 3m with PCP    Date Provider   Last Visit   10/3/2024 Darci Guthrie MD   Next Visit   5/20/2025 Darci Guthrie MD   ED visits in past 90 days: 0        Note composed:6:22 AM 05/20/2025

## 2025-05-21 ENCOUNTER — TELEPHONE (OUTPATIENT)
Dept: GASTROENTEROLOGY | Facility: CLINIC | Age: 80
End: 2025-05-21
Payer: MEDICARE

## 2025-05-21 ENCOUNTER — RESULTS FOLLOW-UP (OUTPATIENT)
Dept: TRANSPLANT | Facility: HOSPITAL | Age: 80
End: 2025-05-21

## 2025-05-21 NOTE — TELEPHONE ENCOUNTER
----- Message from Ramiro Jefferson MD sent at 5/21/2025  4:45 PM CDT -----  Regarding: RE: Return call?  Contact: [phone]  No not me  ----- Message -----  From: Richelle Saenz MA  Sent: 5/21/2025   4:33 PM CDT  To: Ramiro Jefferson MD  Subject: FW: Return call?                                 Spoke with patient.  She stated she received a call from you.  Stated you name shows up on her caller ID.   Did you try calling her?   Laura  ----- Message -----  From: Piter Cadet  Sent: 5/21/2025  10:01 AM CDT  To: [facility] Staff  Subject: Return call?                                     Type:  Patient Returning CallWho Called:The pt Who Left Message for Patient:Pt unsure Does the patient know what this is regarding?:Pt unsure, says she received a call form the office. Would the patient rather a call back or a response via [facility]? Call back Best Call Back Number: [phone] Information: Thank you.

## 2025-05-27 ENCOUNTER — TELEPHONE (OUTPATIENT)
Dept: PAIN MEDICINE | Facility: CLINIC | Age: 80
End: 2025-05-27

## 2025-05-27 ENCOUNTER — OFFICE VISIT (OUTPATIENT)
Dept: PAIN MEDICINE | Facility: CLINIC | Age: 80
End: 2025-05-27
Payer: MEDICARE

## 2025-05-27 VITALS
HEIGHT: 62 IN | SYSTOLIC BLOOD PRESSURE: 121 MMHG | BODY MASS INDEX: 24.7 KG/M2 | HEART RATE: 65 BPM | DIASTOLIC BLOOD PRESSURE: 70 MMHG | WEIGHT: 134.25 LBS

## 2025-05-27 DIAGNOSIS — M46.1 BILATERAL SACROILIITIS: Primary | ICD-10-CM

## 2025-05-27 DIAGNOSIS — M70.62 GREATER TROCHANTERIC BURSITIS OF LEFT HIP: ICD-10-CM

## 2025-05-27 DIAGNOSIS — G89.29 CHRONIC LEFT SI JOINT PAIN: ICD-10-CM

## 2025-05-27 DIAGNOSIS — M53.3 CHRONIC LEFT SI JOINT PAIN: ICD-10-CM

## 2025-05-27 DIAGNOSIS — M47.816 LUMBAR SPONDYLOSIS: ICD-10-CM

## 2025-05-27 PROCEDURE — 99214 OFFICE O/P EST MOD 30 MIN: CPT | Mod: S$PBB,,, | Performed by: STUDENT IN AN ORGANIZED HEALTH CARE EDUCATION/TRAINING PROGRAM

## 2025-05-27 PROCEDURE — 99215 OFFICE O/P EST HI 40 MIN: CPT | Mod: PBBFAC | Performed by: STUDENT IN AN ORGANIZED HEALTH CARE EDUCATION/TRAINING PROGRAM

## 2025-05-27 PROCEDURE — G2211 COMPLEX E/M VISIT ADD ON: HCPCS | Mod: S$PBB,,, | Performed by: STUDENT IN AN ORGANIZED HEALTH CARE EDUCATION/TRAINING PROGRAM

## 2025-05-27 PROCEDURE — 99999 PR PBB SHADOW E&M-EST. PATIENT-LVL V: CPT | Mod: PBBFAC,,, | Performed by: STUDENT IN AN ORGANIZED HEALTH CARE EDUCATION/TRAINING PROGRAM

## 2025-05-27 NOTE — TELEPHONE ENCOUNTER
----- Message from Charly Oro sent at 2025  3:08 PM CDT -----  Regarding: Order for MAXI ROBERTS    Patient Name: MAXI ROBERTS([id number])  Sex: Female  : 1945      PCP: YONG TADEO    Center: [facility] CENTRAL BILLING OFFICE     Types of orders made on 2025: Outpatient Referral, Procedure Request    Order Date:2025  Ordering User:CHARLY ORO [[id number]]  Encounter Provider:Charly Oro MD [[id number]]  Authorizing Provider: Charly Oro MD [[id number]]  Department:[facility]IN [facility]NAG[facility][[id number]]    Common Order Information  Procedure -> Sacroiliac Injection (Specify laterality) Cmt: B/L SI J    Order Specific Information  Order: Procedure Order to Pain Management [Custom: [id number]]  Order #:          [id number]: 1 FUTURE    Priority: Routine  Class: Clinic Performed    Future Order Information      Expires on:2026            Expected by:2025                   Associated Diagnoses      M53.3, G89.29 Chronic left SI joint pain      M46.1 Bilateral sacroiliitis      Physician -> PREETHI         Is patient on anti-coagulants? -> No         Facility Name: -> [facility]         Follow-up: -> 2 weeks           Priority: Routine  Class: Clinic Performed    Future Order Information      Expires on:2026            Expected by:2025                   Associated Diagnoses      M53.3, G89.29 Chronic left SI joint pain      M46.1 Bilateral sacroiliitis      Procedure -> Sacroiliac Injection (Specify laterality) Cmt: B/L SI J        Physician -> PREETHI         Is patient on anti-coagulants? -> No         Facility Name: -> [facility]         Follow-up: -> 2 weeks

## 2025-05-27 NOTE — PROGRESS NOTES
Chronic patient Established Note (Follow up visit)      SUBJECTIVE:    INTERVAL HISTORY 5/27/2025:  Anayeli Judd presents to the clinic for a follow-up appointment for chronic pain. Current pain intensity is 8/10.  Since the last visit, Anayeli Judd states:  she has a history of back surgery and neck fusion (Bailee Reynolds MD) and has suffered with pain since that time. She reports that she feels her busitis is acting up and she reports prior injections have helped with this in the past.    PRIOR HISTORY (MD Jo - 10/4/22):   78 y/o F presents with longstanding low back pain. Pain is described as crushing and shooting down bilateral legs to feet. It is worse with prolonged walking and improves with laying down and bending forward. Patient denies any numbness or weakness. Has a history of SCC of skin. She reports overflow incontinence, denies bowel incontinence, denies night sweats, weight loss. She had a lumbar fusion 15 years ago at , unsure of the levels. She also had RED injections 10 years ago with no relief. She takes gabapentin and tylenol at home. Stopped PT due to COVID and has not continued.    Pain Disability Index Review:      5/27/2025     2:45 PM 10/4/2022     2:38 PM   Last 3 PDI Scores   Pain Disability Index (PDI) 42 40       Pain Medications:   - tylenol    Opioid Contract: no     report:  Reviewed and consistent with medication use as prescribed.      Pain Procedures:   12/21/2022 - B/L L5/S1 TFESI    Physical Therapy/Home Exercise: yes, years ago, not actively participating in a home exercise program as she feels this aggravates her pain    Imaging:   CT LUMBAR SPINE WITHOUT CONTRAST     CLINICAL HISTORY:  Low back pain, trauma;     Additional history, from the current ED provider note: Slip and fall this morning.  Fell again while trying to get back up.  Back pain.  Generalized pain.     TECHNIQUE:  Low-dose axial, sagittal and coronal reformations are obtained through the  lumbar spine.  Contrast was not administered.     COMPARISON:  CT abdomen and pelvis with IV contrast 07/07/2023.     Lumbar spine radiograph 06/06/2023.     MRI lumbar spine without contrast 09/12/2022     CT lumbar spine without contrast 08/21/2012.     FINDINGS:  Diffusely, but slightly heterogeneously, demineralized appearance of the visualized bones, likely related to osteopenia/osteoporosis.  In the appropriate clinical context, metastatic or metabolic bone disease might also be a consideration.  This demineralization could limit sensitivity of CT for detection of some osseous abnormalities, including some fractures.     There is pre-existing grade 2 L5-S1 spondylolisthesis, thought to be on the basis of the chronic L5 spondylolysis.  These findings are similar to the comparison imaging extending at least as far back as 2012.     Pre-existing grade 1 L4-L5 spondylolisthesis, similar to the 09/12/2022 MRI, and thought to be on the basis of the advanced facet disease.     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  There are bilateral ligamentous ossifications extending from the L5 transverse processes toward either iliac crest, likely related to degenerative change or possibly to old trauma.  Overall, the disc displacement and intraspinal soft tissue detail is suboptimally visualized on these CT images.     The lumbar lordosis is preserved.  There is pre-existing mild lumbar levocurvature, similar to 2012.     Allowing for the above, no acute fracture deformity or acute traumatic vertebral malalignment is apparent in the lumbar spine.     Substantial distension of the partially visualized urinary bladder.  Increased distension of the renal collecting systems and ureters bilaterally is likely  secondary to the bladder distension.     Partially visualized surgical changes in the abdomen.  There is some STIR new increased stranding in the intra-abdominal fat, significance uncertain.  The visualized portion of the stomach and most of the visualized portion of colon appear collapsed, limiting CT assessment for abnormal mucosal or mural thickening.  Scattered ASCVD.     Impression:     Advanced chronic degenerative changes throughout the lumbar spine .     No acute fracture deformity or acute traumatic vertebral body malalignment apparent in the visual spine.     Additional findings as detailed above.        Electronically signed by:Judson Ellison  Date:                                            08/20/2023  Time:                                           13:06    CT CERVICAL SPINE WITHOUT CONTRAST     CLINICAL HISTORY:  Polytrauma, blunt;     TECHNIQUE:  Low dose axial images, sagittal and coronal reformations were performed though the cervical spine.  Contrast was not administered.     COMPARISON:  Radiograph 06/06/2023     FINDINGS:  There is motion artifact.     The visualized portions of the lung apices are grossly unremarkable.  The parotid glands and remaining visualized salivary glands are grossly unremarkable.  No significant tonsillar enlargement.  No significant cervical lymphadenopathy.  There is carotid vascular calcification.  The posterior paraspinous and hypopharyngeal soft tissues are grossly unremarkable.     Sagittal reformatted imaging demonstrates adequate alignment of the cervical spine noting anterior fusion spans C4 through C6 with disc spacing material at C4-C5 and C5-C6.  There is significant disc space height loss and endplate degenerative change at C6-C7 noting prominent anterior marginal osteophytosis at C6-C7.  The facet joints are aligned noting multilevel facet arthropathy.  No acute displaced fracture or dislocation of the cervical spine.  Motion artifact significantly limits  evaluation for spondylitic changes.  Allowing for this, there is multilevel moderate spinal canal stenosis and neuroforaminal narrowing.  The airway is patent.     Impression:     1. Allowing for motion artifact, no convincing acute displaced fracture or dislocation of the cervical spine noting degenerative changes, surgical changes, and additional findings above.        Electronically signed by:Duglas Alcantara MD  Date:                                            08/20/2023  Time:                                           12:31    MRI LUMBAR SPINE WITHOUT CONTRAST     CLINICAL HISTORY:  Lumbar radiculopathy, symptoms persist with conservative treatment; Radiculopathy, lumbar region     TECHNIQUE:  Multiplanar, multisequence MR images were acquired from the thoracolumbar junction to the sacrum without the administration of contrast.     COMPARISON:  None     FINDINGS:  Vertebral body heights are maintained.     Disc space narrowing and endplate changes L2-3 through L5-S1; L5 and S1 are essentially fused.     Grade 1 retrolisthesis T11-12.  Grade 1 anterolisthesis L4-5.  Grade 2 anterolisthesis L5-S1.     Conus terminates appropriately at T12-L1.     Multilevel degenerative change as diesel below:     T10-T11: Posterior circumferential disc bulge and ligamentum flavum hypertrophy result in moderate canal narrowing as seen on sagittal images.     T11-12: Posterior circumferential disc bulge with mild canal narrowing as seen on sagittal images.     T11-12: No significant canal or neural foraminal narrowing.     L1-2: Facet arthropathy with no significant canal or neural foraminal narrowing.     L2-3: Small posterior circumferential disc bulge, ligamentum flavum hypertrophy, and facet arthropathy.  Moderate right and mild left neural foraminal narrowing.     L3-4: Small posterior circumferential disc bulge.  Facet and ligamentum flavum hypertrophic changes.  Moderate bilateral neural foraminal narrowing.     L4-5:  Posterior circumferential disc bulge.  Mild canal narrowing.  Severe left and moderate right neural foraminal narrowing.     L5-S1: Uncovering of the disc.  Severe bilateral neural foraminal narrowing.     Moderate dilatation of the right renal pelvis and collecting system with fairly normal caliber ureter.  Mild progression of findings compared to most recent CT.     Impression:     Moderate dilatation right renal pelvis and collecting system with mild progression compared to CT 06/07/2022.  Findings can be seen with underlying UPJ stenosis given the fairly normal caliber ureter.     Grade 2 anterolisthesis L5-S1 suspected to be on account of bilateral pars defects.  Additional levels of spondylolisthesis as above.     Multilevel degenerative change resulting in multilevel moderate to severe neural foraminal narrowing L2-3 through L5-S1 as given in detail above.        Electronically signed by:Giana Alvarado  Date:                                            09/12/2022  Time:                                           15:08    Allergies:   Review of patient's allergies indicates:   Allergen Reactions    Codeine Itching and Nausea And Vomiting    Dilaudid [hydromorphone (bulk)] Other (See Comments)     Oversedating, head burning. Pt prefers to avoid.       Percocet [oxycodone-acetaminophen] Itching    Sulfa (sulfonamide antibiotics) Itching and Nausea And Vomiting    Dilaudid [hydromorphone]     Latex, natural rubber Rash    Opioids - morphine analogues Rash       Current Medications:   Current Medications[1]    REVIEW OF SYSTEMS:    GENERAL:  No new weight loss, malaise or fevers.  HEENT:  Negative for new frequent or significant headaches.  NECK:  Negative for new lumps, goiter, and significant neck swelling.  RESPIRATORY:  Negative for new cough, wheezing or shortness of breath.  CARDIOVASCULAR:  Negative for new chest pain, leg swelling or palpitations.  GI:  Negative for new abdominal discomfort, blood in stools  or black stools or change in bowel habits.  MUSCULOSKELETAL:  See HPI.  SKIN:  Negative for new lesions, rash, and itching.  PSYCH:  Negative for new sleep disturbance, mood disorder  HEMATOLOGY/LYMPHOLOGY:  Negative for new prolonged bleeding, bruising easily or swollen nodes.  NEURO:   No new headaches, syncope, paralysis, seizures or tremors.  All other reviewed and negative other than HPI.    Past Medical History:  Past Medical History:   Diagnosis Date    Abdominal pain     Allergic rhinitis     Arthritis     Blood platelet disorder     Blood platelet disorder     Blood transfusion     during delivery and     Bowel obstruction     Cervical radiculopathy     followed by dr cloud    Cirrhosis of liver     Colon cancer     transverse colon; resected; Stage IIA (pT3 pN0 MX)    Degenerative joint disease (DJD) of lumbar spine     Diabetes mellitus     Diarrhea     Falls 2020    Family history of breast cancer     Family history of colon cancer     Fatty liver     GERD (gastroesophageal reflux disease)     History of shingles     Hyperlipidemia     Hypomagnesemia     Hypothyroidism     Irritable bowel syndrome     Microscopic colitis     treated     Migraine     Myelodysplastic syndrome     Raynaud phenomenon     Raynaud's disease     Squamous cell carcinoma of skin     Type 2 diabetes mellitus     Usual hyperplasia of lactiferous duct 1970       Past Surgical History:  Past Surgical History:   Procedure Laterality Date    ADENOIDECTOMY      APPENDECTOMY      BACK SURGERY      BREAST BIOPSY  1970    BREAST CYST EXCISION  1970?    BREAST LUMPECTOMY  1970    CARPAL TUNNEL RELEASE      bilateral      SECTION      CHOLECYSTECTOMY  1965    COLECTOMY  2011    Transverse colon resection by Dr. Aguirre    COLONOSCOPY N/A 2017    Procedure: COLONOSCOPY;  Surgeon: Manjit Alvarez MD;  Location: [address];  Service: Endoscopy;  Laterality: N/A;    COLONOSCOPY N/A 2019     Procedure: COLONOSCOPY;  Surgeon: Ramiro Jefferson MD;  Location: [facility] (4TH FLR);  Service: Endoscopy;  Laterality: N/A;  PM prep    COLONOSCOPY N/A 05/04/2022    Procedure: COLONOSCOPY;  Surgeon: Ramiro Jefferson MD;  Location: [facility] (2ND FLR);  Service: Endoscopy;  Laterality: N/A;  EGD and colonoscopy in 6-8 weeks from now     states has constipation. -extended Golytely prep    CYSTOSCOPY N/A 11/09/2021    Procedure: CYSTOSCOPY;  Surgeon: Viridiana Valenzuela MD;  Location: [facility] 1ST FLR;  Service: Urology;  Laterality: N/A;    ENDOSCOPIC ULTRASOUND OF UPPER GASTROINTESTINAL TRACT N/A 12/12/2018    Procedure: ULTRASOUND, UPPER GI TRACT, ENDOSCOPIC;  Surgeon: Jose Hess MD;  Location: [facility];  Service: Endoscopy;  Laterality: N/A;    ENDOSCOPIC ULTRASOUND OF UPPER GASTROINTESTINAL TRACT N/A 01/23/2019    Procedure: ULTRASOUND, UPPER GI TRACT, ENDOSCOPIC;  Surgeon: Jose Hess MD;  Location: [facility] (2ND FLR);  Service: Endoscopy;  Laterality: N/A;    ENDOSCOPIC ULTRASOUND OF UPPER GASTROINTESTINAL TRACT N/A 10/25/2023    Procedure: ULTRASOUND, UPPER GI TRACT, ENDOSCOPIC;  Surgeon: Jose Campos MD;  Location: [facility];  Service: Endoscopy;  Laterality: N/A;  10/20/23: instructions sent via portal-GD    ESOPHAGOGASTRODUODENOSCOPY N/A 11/16/2018    Procedure: EGD (ESOPHAGOGASTRODUODENOSCOPY);  Surgeon: Angelo Reynolds MD;  Location: [facility] (2ND FLR);  Service: Endoscopy;  Laterality: N/A;    ESOPHAGOGASTRODUODENOSCOPY N/A 06/26/2019    Procedure: EGD (ESOPHAGOGASTRODUODENOSCOPY);  Surgeon: Ramiro Jefferson MD;  Location: [facility] (4TH FLR);  Service: Endoscopy;  Laterality: N/A;    ESOPHAGOGASTRODUODENOSCOPY N/A 05/04/2022    Procedure: EGD (ESOPHAGOGASTRODUODENOSCOPY);  Surgeon: Ramiro Jefferson MD;  Location: [facility] (2ND FLR);  Service: Endoscopy;  Laterality: N/A;  fully vaccinated-GT    ESOPHAGOGASTRODUODENOSCOPY N/A 10/25/2023    Procedure: EGD  (ESOPHAGOGASTRODUODENOSCOPY);  Surgeon: Jose Campos MD;  Location: [facility] ENDO;  Service: Endoscopy;  Laterality: N/A;    ESOPHAGOGASTRODUODENOSCOPY N/A 11/15/2023    Procedure: ESOPHAGOGASTRODUODENOSCOPY (EGD);  Surgeon: Db Sanchez MD;  Location: [facility] (4TH FLR);  Service: Endoscopy;  Laterality: N/A;  New diagnosis of esophageal varices. Not a candidate for beta blocker due to soft blood pressures. Recommend EV banding.  PT/INR done on 11/3/23 and CBC done on 9/30/23  ok per Dr. Espinoza to be done by 1st available provider-see tele encounter-st  1    EYE SURGERY      Cataract Removal    FLUOROSCOPIC URODYNAMIC STUDY N/A 11/09/2021    Procedure: URODYNAMIC STUDY, FLUOROSCOPIC;  Surgeon: Viridiana Valenzuela MD;  Location: [facility] OR 1ST FLR;  Service: Urology;  Laterality: N/A;  90 minutes     HYSTERECTOMY      JOINT REPLACEMENT      OOPHORECTOMY  1970    posterolateral fusion with autograft bone and Woodson mineralized bone matrix  02/01/2013    at [facility] for lumbar spine stenosis    SMALL INTESTINE SURGERY      SPINE SURGERY      TOE SURGERY      TONSILLECTOMY      TRANSFORAMINAL EPIDURAL INJECTION OF STEROID Bilateral 12/21/2022    Procedure: Injection,steroid,epidural, Todd L5/S1 TF CONTRAST DIRECT REFERRAL;  Surgeon: Toni Osorio MD;  Location: [facility] PAIN MGT;  Service: Pain Management;  Laterality: Bilateral;    TRIGGER FINGER RELEASE         Family History:  Family History   Problem Relation Name Age of Onset    Heart disease Father August 50        Mi age 50    Colon cancer Father August     Bladder Cancer Mother Mariana         non smoker    Cataracts Mother Mariana     Glaucoma Mother Mariana     Heart disease Mother Mariana     Hyperlipidemia Mother Mariana     Kidney disease Mother Mariana     Breast cancer Sister . Damaris Dye 79    Arthritis Daughter Wendy     Asthma Daughter Wendy     Depression Daughter Wendy     Hypertension Daughter Wendy     Stroke Daughter Wendy 40    Arthritis Brother Timoteo      Colon cancer Brother Timoteo Garcia    Alcohol abuse Brother Timoteo     Parkinsonism Brother Ronnie     Alcohol abuse Brother Ronnie     Depression Daughter Danielle     Stroke Daughter Danielle     Alcohol abuse Brother Harjeet     Arthritis Brother Harjeet     Asthma Daughter Kimberly     Depression Daughter Kimberly     Celiac disease Neg Hx      Cirrhosis Neg Hx      Colon polyps Neg Hx      Crohn's disease Neg Hx      Cystic fibrosis Neg Hx      Esophageal cancer Neg Hx      Hemochromatosis Neg Hx      Inflammatory bowel disease Neg Hx      Irritable bowel syndrome Neg Hx      Liver cancer Neg Hx      Liver disease Neg Hx      Rectal cancer Neg Hx      Stomach cancer Neg Hx      Ulcerative colitis Neg Hx      Eliazar's disease Neg Hx      Amblyopia Neg Hx      Blindness Neg Hx      Macular degeneration Neg Hx      Retinal detachment Neg Hx      Strabismus Neg Hx      Melanoma Neg Hx         Social History:  Social History     Socioeconomic History    Marital status:    Tobacco Use    Smoking status: Never    Smokeless tobacco: Never   Substance and Sexual Activity    Alcohol use: Not Currently     Comment: socially, rarely    Drug use: Never    Sexual activity: Not Currently   Social History Narrative    , lives alone.  Primary support are her children and friends.     Social Drivers of Health     Financial Resource Strain: Low Risk  (3/30/2025)    Overall Financial Resource Strain (CARDIA)     Difficulty of Paying Living Expenses: Not hard at all   Food Insecurity: No Food Insecurity (3/30/2025)    Hunger Vital Sign     Worried About Running Out of Food in the Last Year: Never true     Ran Out of Food in the Last Year: Never true   Transportation Needs: No Transportation Needs (3/30/2025)    PRAPARE - Transportation     Lack of Transportation (Medical): No     Lack of Transportation (Non-Medical): No   Physical Activity: Inactive (3/30/2025)    Exercise Vital Sign     Days of Exercise per Week: 0 days      "Minutes of Exercise per Session: 0 min   Stress: No Stress Concern Present (3/30/2025)    [employer] [place] [employer] - Occupational Stress Questionnaire     Feeling of Stress : Not at all   Housing Stability: Low Risk  (3/30/2025)    Housing Stability Vital Sign     Unable to Pay for Housing in the Last Year: No     Homeless in the Last Year: No       OBJECTIVE:  /70 (BP Location: Right arm, Patient Position: Sitting)   Pulse 65   Ht 5' 2" (1.575 m)   Wt 60.9 kg (134 lb 4.2 oz)   LMP  (LMP Unknown)   BMI 24.56 kg/m²     PHYSICAL EXAMINATION:  General appearance: Well appearing, in no acute distress, alert and appropriately communicative.  Psych:  Mood and affect appropriate.  Skin: Skin color, texture, turgor normal, no rashes or lesions, in both upper and lower body for exposed skin.  Head/face:  Atraumatic, normocephalic.  Cor: regular rate  Pulm: non-labored breathing  GI: Abdomen non-distended and non-tender.  Back: Straight leg raising in the sitting and supine positions is negative to radicular pain.  pain to palpation over the spine and/or paraspinal muscles. Pain with lumbar extension and facet loading.  Extremities: No deformities, significant lymphedema, or skin discoloration. No significant open wounds. No major amputations.  Musculoskeletal: hip, sacroiliac and knee provocative maneuvers are negative with the exception of +yeoman's bilaterally, +SI joint tenderness, +Rupa finger test, +SI thrust. Bilateral upper and lower extremity strength is normal and symmetric.  No atrophy or tone abnormalities are noted.  Neuro: Bilateral upper and lower extremity coordination and muscle stretch reflexes abnormalities noted: none.  Smith and/or Clonus: negative; loss of sensation to light touch: none  Gait: uses a cane    CMP  Sodium   Date Value Ref Range Status   05/20/2025 137 136 - 145 mmol/L Final   02/10/2025 136 136 - 145 mmol/L Final     Potassium   Date Value Ref Range Status "   05/20/2025 4.1 3.5 - 5.1 mmol/L Final   02/10/2025 5.0 3.5 - 5.1 mmol/L Final     Chloride   Date Value Ref Range Status   05/20/2025 104 95 - 110 mmol/L Final   02/10/2025 106 95 - 110 mmol/L Final     CO2   Date Value Ref Range Status   05/20/2025 21 (L) 23 - 29 mmol/L Final   02/10/2025 20 (L) 23 - 29 mmol/L Final     Glucose   Date Value Ref Range Status   05/20/2025 165 (H) 70 - 110 mg/dL Final   02/10/2025 167 (H) 70 - 110 mg/dL Final     BUN   Date Value Ref Range Status   05/20/2025 13 8 - 23 mg/dL Final     Creatinine   Date Value Ref Range Status   05/20/2025 1.1 0.5 - 1.4 mg/dL Final     Calcium   Date Value Ref Range Status   05/20/2025 9.6 8.7 - 10.5 mg/dL Final   02/10/2025 9.8 8.7 - 10.5 mg/dL Final     Protein Total   Date Value Ref Range Status   05/20/2025 6.6 6.0 - 8.4 gm/dL Final     Total Protein   Date Value Ref Range Status   02/10/2025 7.9 6.0 - 8.4 g/dL Final     Albumin   Date Value Ref Range Status   05/20/2025 3.5 3.5 - 5.2 g/dL Final   02/10/2025 4.0 3.5 - 5.2 g/dL Final     Total Bilirubin   Date Value Ref Range Status   02/10/2025 0.9 0.1 - 1.0 mg/dL Final     Comment:     For infants and newborns, interpretation of results should be based  on gestational age, weight and in agreement with clinical  observations.    Premature Infant recommended reference ranges:  Up to 24 hours.............<8.0 mg/dL  Up to 48 hours............<12.0 mg/dL  3-5 days..................<15.0 mg/dL  6-29 days.................<15.0 mg/dL       Bilirubin Total   Date Value Ref Range Status   05/20/2025 0.7 0.1 - 1.0 mg/dL Final     Comment:     For infants and newborns, interpretation of results should be based   on gestational age, weight and in agreement with clinical   observations.    Premature Infant recommended reference ranges:   0-24 hours:  <8.0 mg/dL   24-48 hours: <12.0 mg/dL   3-5 days:    <15.0 mg/dL   6-29 days:   <15.0 mg/dL     Alkaline Phosphatase   Date Value Ref Range Status   02/10/2025  65 40 - 150 U/L Final     ALP   Date Value Ref Range Status   05/20/2025 72 40 - 150 unit/L Final     AST   Date Value Ref Range Status   05/20/2025 24 11 - 45 unit/L Final   02/10/2025 25 10 - 40 U/L Final     ALT   Date Value Ref Range Status   05/20/2025 18 10 - 44 unit/L Final   02/10/2025 15 10 - 44 U/L Final     Anion Gap   Date Value Ref Range Status   05/20/2025 12 8 - 16 mmol/L Final     eGFR   Date Value Ref Range Status   05/20/2025 50 (L) >60 mL/min/1.73/m2 Final     Comment:     Estimated GFR calculated using the CKD-EPI creatinine (2021) equation.   02/10/2025 56.2 (A) >60 mL/min/1.73 m^2 Final     ASSESSMENT: 80 y.o. year old female with chronic pain, consistent with:     1. Bilateral sacroiliitis  Procedure Order to Pain Management      2. Chronic left SI joint pain  Ambulatory referral/consult to Pain Clinic    Procedure Order to Pain Management      3. Greater trochanteric bursitis of left hip  Ambulatory referral/consult to Pain Clinic      4. Lumbar spondylosis          IMPRESSION: Anayeli Judd presents today for lower back pain. History and physical exam are consistent with bilateral sacroiliitis and lumbar facet arthropathy.  Imaging is consistent with lumbar degenerative disc disease with central and foraminal stenosis at multiple levels and listhesis at L4/5 and L5/S1.  They have failed conservative management with physical therapy/home exercises and non-narcotic medications.  At this time, we will consider advanced imaging and/or interventions to give them some additional relief for their pain.    PLAN:   - I have stressed the importance of physical activity and a home exercise plan to help with pain and improve health.  - Patient can continue with medications for now since they are providing benefits, using them appropriately, and without side effects.  - we again discussed the importance of home exercise program  - schedule for bilateral SI joint injections  - we can consider medial  branch blocks if her pain persists  - RTC after SI joint inejctions  - Counseled patient regarding the importance of activity modification and physical therapy.    The above plan and management options were discussed at length with patient. Patient is in agreement with the above and verbalized understanding.    Charly Mahmood  05/27/2025           [1]   Current Outpatient Medications   Medication Sig Dispense Refill    acetaminophen (TYLENOL) 650 MG TbSR Take 1,300 mg by mouth 2 (two) times a day.      ascorbic acid, vitamin C, (VITAMIN C) 1000 MG tablet Take 1,000 mg by mouth once daily.      azelastine (ASTELIN) 137 mcg (0.1 %) nasal spray 1 spray (137 mcg total) by Nasal route 2 (two) times daily. 30 mL 11    biotin 10,000 mcg TbDL Take 1 tablet by mouth once daily.       blood sugar diagnostic (FREESTYLE LITE STRIPS) Strp 1 strip by Misc.(Non-Drug; Combo Route) route 3 (three) times daily. 200 strip 5    blood-glucose meter kit Use as instructed 1 each 0    calcium-vitamin D3 (OS-KASSANDRA 500 + D3) 500 mg-5 mcg (200 unit) per tablet Take 1 tablet by mouth once daily.      cranberry extract 200 mg Cap Take 1 capsule by mouth once daily.      diclofenac sodium (VOLTAREN) 1 % Gel Apply 2 g topically 2 (two) times daily. Apply to affected area as needed for pain. 100 g 3    donepeziL (ARICEPT) 10 MG tablet TAKE 1 TABLET(10 MG) BY MOUTH EVERY EVENING 90 tablet 3    DULoxetine (CYMBALTA) 30 MG capsule Take 1 capsule (30 mg total) by mouth 2 (two) times daily. 180 capsule 3    flash glucose scanning reader (FREESTYLE EFREN 14 DAY READER) Misc Check blood glucose level 3 times daily. 1 each 0    flash glucose sensor (FREESTYLE EFREN 14 DAY SENSOR) Kit APPLY 1 SENSOR EVERY 14 DAYS 6 kit 3    gabapentin (NEURONTIN) 300 MG capsule Take 1 to 2 capsules 3 times daily if needed for leg pain. 180 capsule 5    hydrocortisone 2.5 % cream Apply topically 2 (two) times daily as needed. 1 each 1    lactulose (CHRONULAC) 20 gram/30 mL Soln  Take 30 mLs (20 g total) by mouth 3 (three) times daily as needed (Constipation). 300 mL 0    lancets (FREESTYLE LANCETS) 28 gauge Misc 30 lancets by Misc.(Non-Drug; Combo Route) route Daily. 30 each 3    levothyroxine (SYNTHROID) 75 MCG tablet TAKE 1 TABLET BY MOUTH BEFORE  BREAKFAST 90 tablet 3    LIDOcaine (LIDODERM) 5 % APPLY 1 TO 3 PATCHES TO BACK EVERY DAY. REMOVE WITHIN 12 HOURS 30 patch 2    linaGLIPtin (TRADJENTA) 5 mg Tab tablet TAKE 1 TABLET(5 MG) BY MOUTH EVERY DAY 90 tablet 3    magnesium 30 mg Tab Take 30 mg by mouth once. Patient does not know actual dose in MG      magnesium citrate solution Take 300 mLs by mouth.      metFORMIN (GLUCOPHAGE) 1000 MG tablet TAKE 1 TABLET(1000 MG) BY MOUTH TWICE DAILY WITH MEALS 180 tablet 3    mometasone (ELOCON) 0.1 % ointment Apply topically 2 (two) times a day. Mix 50/50 with mupirocin ointment. Apply in each nostril twice daily. 15 g 1    naproxen (EC-NAPROSYN) 375 MG TbEC EC tablet Take 375 mg by mouth 2 (two) times daily with meals.      ospemifene (OSPHENA) 60 mg Tab Take by mouth.      pantoprazole (PROTONIX) 40 MG tablet Take 1 tablet (40 mg total) by mouth 2 (two) times daily. 180 tablet 1    PREMARIN vaginal cream INSERT 0.5 GRAM VAGINALLY 2 TIMES A WEEK 30 g 3    rifAXIMin (XIFAXAN) 550 mg Tab Take 1 tablet (550 mg total) by mouth 2 (two) times daily. 60 tablet 11    traZODone (DESYREL) 50 MG tablet TAKE 1 TABLET(50 MG) BY MOUTH EVERY EVENING 90 tablet 3    triamcinolone acetonide 0.1% (KENALOG) 0.1 % paste 3 (three) times daily.      ubrogepant (UBRELVY) 100 mg tablet Take 1 tablet daily as needed for migraine headache. May take 1 additional tablet 2 hours later if needed. 16 tablet 2    valACYclovir (VALTREX) 1000 MG tablet Take 1 tablet (1,000 mg total) by mouth once daily. 90 tablet 1     No current facility-administered medications for this visit.

## 2025-05-28 ENCOUNTER — PATIENT MESSAGE (OUTPATIENT)
Dept: ADMINISTRATIVE | Facility: HOSPITAL | Age: 80
End: 2025-05-28
Payer: MEDICARE

## 2025-05-28 NOTE — PROGRESS NOTES
Subjective:       Patient ID: Anayeli Judd is a 80 y.o. female.    Chief Complaint: Annual Exam      HPI  Annual health exam. Reviewed medical, surgical, social and family history, medications, appropriate preventive health screenings, as well as vaccination history. Updates as noted below or in assessment and plan.    2 main focuses from pt perspective today. Chronic pains and she would like to reduce med list as much as possible.  Left lower back pain, also some on right. Hx of lumbar RED but not very effective for her. Takes Gabapentin 600 qhs.  Right sided epistaxis regularly lately. Also some left ear discomfort.  Lives at home still. Caregiver/assistant present most of time. Also good family support. They manage meds.      Past Medical History:   Diagnosis Date    Abdominal pain     Allergic rhinitis     Arthritis     Blood platelet disorder     Blood platelet disorder     Blood transfusion     during delivery and     Bowel obstruction     Cervical radiculopathy     followed by dr cloud    Cirrhosis of liver     Colon cancer     transverse colon; resected; Stage IIA (pT3 pN0 MX)    Degenerative joint disease (DJD) of lumbar spine     Diabetes mellitus     Diarrhea     Falls 2020    Family history of breast cancer     Family history of colon cancer     Fatty liver     GERD (gastroesophageal reflux disease)     History of shingles     Hyperlipidemia     Hypomagnesemia     Hypothyroidism     Irritable bowel syndrome     Microscopic colitis     treated     Migraine     Myelodysplastic syndrome     Raynaud phenomenon     Raynaud's disease     Squamous cell carcinoma of skin     Type 2 diabetes mellitus     Usual hyperplasia of lactiferous duct 1970       Current Medications[1]    Past Surgical History:   Procedure Laterality Date    ADENOIDECTOMY      APPENDECTOMY      BACK SURGERY      BREAST BIOPSY  1970    BREAST CYST EXCISION  ?    BREAST LUMPECTOMY  1970    CARPAL TUNNEL RELEASE       bilateral      SECTION      CHOLECYSTECTOMY  1965    COLECTOMY  2011    Transverse colon resection by Dr. Aguirre    COLONOSCOPY N/A 2017    Procedure: COLONOSCOPY;  Surgeon: Manjit Alvarez MD;  Location: [facility] (4TH FLR);  Service: Endoscopy;  Laterality: N/A;    COLONOSCOPY N/A 2019    Procedure: COLONOSCOPY;  Surgeon: Ramiro Jefferson MD;  Location: [facility] (4TH FLR);  Service: Endoscopy;  Laterality: N/A;  PM prep    COLONOSCOPY N/A 2022    Procedure: COLONOSCOPY;  Surgeon: Ramiro Jefferson MD;  Location: [facility] (2ND FLR);  Service: Endoscopy;  Laterality: N/A;  EGD and colonoscopy in 6-8 weeks from now     states has constipation. -extended Golytely prep    CYSTOSCOPY N/A 2021    Procedure: CYSTOSCOPY;  Surgeon: Viridiana Valenzuela MD;  Location: [facility] OR 1ST FLR;  Service: Urology;  Laterality: N/A;    ENDOSCOPIC ULTRASOUND OF UPPER GASTROINTESTINAL TRACT N/A 2018    Procedure: ULTRASOUND, UPPER GI TRACT, ENDOSCOPIC;  Surgeon: Jose Hess MD;  Location: [facility];  Service: Endoscopy;  Laterality: N/A;    ENDOSCOPIC ULTRASOUND OF UPPER GASTROINTESTINAL TRACT N/A 2019    Procedure: ULTRASOUND, UPPER GI TRACT, ENDOSCOPIC;  Surgeon: Jose Hess MD;  Location: [facility] (2ND FLR);  Service: Endoscopy;  Laterality: N/A;    ENDOSCOPIC ULTRASOUND OF UPPER GASTROINTESTINAL TRACT N/A 10/25/2023    Procedure: ULTRASOUND, UPPER GI TRACT, ENDOSCOPIC;  Surgeon: Jose Campos MD;  Location: [facility];  Service: Endoscopy;  Laterality: N/A;  10/20/23: instructions sent via portal-GD    ESOPHAGOGASTRODUODENOSCOPY N/A 2018    Procedure: EGD (ESOPHAGOGASTRODUODENOSCOPY);  Surgeon: Angelo Reynolds MD;  Location: [facility] (2ND FLR);  Service: Endoscopy;  Laterality: N/A;    ESOPHAGOGASTRODUODENOSCOPY N/A 2019    Procedure: EGD (ESOPHAGOGASTRODUODENOSCOPY);  Surgeon: Ramiro Jefferson MD;  Location: [facility] (4TH FLR);  Service: Endoscopy;   Laterality: N/A;    ESOPHAGOGASTRODUODENOSCOPY N/A 05/04/2022    Procedure: EGD (ESOPHAGOGASTRODUODENOSCOPY);  Surgeon: Ramiro Jefferson MD;  Location: [facility] (2ND FLR);  Service: Endoscopy;  Laterality: N/A;  fully vaccinated-GT    ESOPHAGOGASTRODUODENOSCOPY N/A 10/25/2023    Procedure: EGD (ESOPHAGOGASTRODUODENOSCOPY);  Surgeon: Jose Campos MD;  Location: [facility] ENDO;  Service: Endoscopy;  Laterality: N/A;    ESOPHAGOGASTRODUODENOSCOPY N/A 11/15/2023    Procedure: ESOPHAGOGASTRODUODENOSCOPY (EGD);  Surgeon: Db Sanchez MD;  Location: [facility] (4TH FLR);  Service: Endoscopy;  Laterality: N/A;  New diagnosis of esophageal varices. Not a candidate for beta blocker due to soft blood pressures. Recommend EV banding.  PT/INR done on 11/3/23 and CBC done on 9/30/23  ok per Dr. Espinoza to be done by 1st available provider-see tele encounter-st  1    EYE SURGERY      Cataract Removal    FLUOROSCOPIC URODYNAMIC STUDY N/A 11/09/2021    Procedure: URODYNAMIC STUDY, FLUOROSCOPIC;  Surgeon: Viridiana Valenzuela MD;  [facility];  Service: Urology;  Laterality: N/A;  90 minutes     HYSTERECTOMY      JOINT REPLACEMENT      OOPHORECTOMY  1970    posterolateral fusion with autograft bone and Eun mineralized bone matrix  02/01/2013    at [facility] for lumbar spine stenosis    SMALL INTESTINE SURGERY      SPINE SURGERY      TOE SURGERY      TONSILLECTOMY      TRANSFORAMINAL EPIDURAL INJECTION OF STEROID Bilateral 12/21/2022    Procedure: Injection,steroid,epidural, Todd L5/S1 TF CONTRAST DIRECT REFERRAL;  Surgeon: Toni Osorio MD;  Location: [facility] PAIN MGT;  Service: Pain Management;  Laterality: Bilateral;    TRIGGER FINGER RELEASE         Family History   Problem Relation Name Age of Onset    Heart disease Father August 50        Mi age 50    Colon cancer Father August     Bladder Cancer Mother Mariana         non smoker    Cataracts Mother Mariana     Glaucoma Mother Mariana     Heart disease Mother Mariana      Hyperlipidemia Mother Mariana     Kidney disease Mother Mariana     Breast cancer Sister Sr. Damaris Dye 79    Arthritis Daughter Wendy     Asthma Daughter Wendy     Depression Daughter Wendy     Hypertension Daughter Wendy     Stroke Daughter Wendy 40    Arthritis Brother Timoteo     Colon cancer Brother Timoteo 70    Alcohol abuse Brother Timoteo     Parkinsonism Brother Ronnie     Alcohol abuse Brother Ronnie     Depression Daughter Danielle     Stroke Daughter Danielle     Alcohol abuse Brother Harjeet     Arthritis Brother Harjeet     Asthma Daughter Kimberly     Depression Daughter Kimberly     Celiac disease Neg Hx      Cirrhosis Neg Hx      Colon polyps Neg Hx      Crohn's disease Neg Hx      Cystic fibrosis Neg Hx      Esophageal cancer Neg Hx      Hemochromatosis Neg Hx      Inflammatory bowel disease Neg Hx      Irritable bowel syndrome Neg Hx      Liver cancer Neg Hx      Liver disease Neg Hx      Rectal cancer Neg Hx      Stomach cancer Neg Hx      Ulcerative colitis Neg Hx      Eliazar's disease Neg Hx      Amblyopia Neg Hx      Blindness Neg Hx      Macular degeneration Neg Hx      Retinal detachment Neg Hx      Strabismus Neg Hx      Melanoma Neg Hx         Social History[2]    Immunization History   Administered Date(s) Administered    COVID-19, MRNA, LN-S, PF (Pfizer) (Purple Cap) 01/17/2021, 02/08/2021, 09/13/2021    Hepatitis A / Hepatitis B 12/16/2013, 01/13/2014, 06/16/2014    Hepatitis B (recombinant) Adjuvanted, 2 dose 11/17/2021, 12/21/2021    PPD Test 04/12/2017    Pneumococcal Conjugate - 7 Valent 10/05/2012    Pneumococcal Polysaccharide - 23 Valent 12/03/2010    Tdap 05/04/2015, 05/20/2025    Zoster 08/04/2010    Zoster Recombinant 11/08/2018, 02/19/2019         Objective:      Vitals:    05/20/25 1326   BP: 113/64   Pulse: 82       Physical Exam  Constitutional:       General: She is not in acute distress.     Appearance: Normal appearance. She is well-developed. She is not ill-appearing.   HENT:       Head: Normocephalic and atraumatic.      Right Ear: Hearing and tympanic membrane normal. There is no impacted cerumen.      Left Ear: Hearing normal. There is impacted cerumen (Partial).      Nose:      Comments: Dried blood in right nasal passage.     Mouth/Throat:      Mouth: Mucous membranes are moist.      Pharynx: Oropharynx is clear.   Eyes:      Extraocular Movements: Extraocular movements intact.      Conjunctiva/sclera: Conjunctivae normal.      Pupils: Pupils are equal, round, and reactive to light.   Neck:      Vascular: No carotid bruit.   Cardiovascular:      Rate and Rhythm: Normal rate and regular rhythm.      Heart sounds: Normal heart sounds. No murmur heard.  Pulmonary:      Effort: Pulmonary effort is normal. No respiratory distress.      Breath sounds: Normal breath sounds. No wheezing, rhonchi or rales.   Abdominal:      General: Abdomen is flat. There is no distension.      Palpations: Abdomen is soft. There is no mass.      Tenderness: There is no abdominal tenderness.      Hernia: No hernia is present.   Musculoskeletal:         General: Tenderness (Left SI and left trochanter.) present. No swelling or deformity.      Cervical back: No tenderness.      Right lower leg: No edema.      Left lower leg: No edema.   Lymphadenopathy:      Cervical: No cervical adenopathy.   Skin:     General: Skin is warm and dry.      Findings: No lesion or rash.   Neurological:      General: No focal deficit present.      Mental Status: She is alert and oriented to person, place, and time.      Cranial Nerves: No cranial nerve deficit.      Coordination: Coordination normal.      Deep Tendon Reflexes: Reflexes normal.   Psychiatric:         Mood and Affect: Mood normal.         Behavior: Behavior normal.         Thought Content: Thought content normal.         Judgment: Judgment normal.         Recent Results (from the past 12 weeks)   Comprehensive metabolic panel    Collection Time: 03/28/25  5:15 PM    Result Value Ref Range    Sodium 136 136 - 145 mmol/L    Potassium 4.5 3.5 - 5.1 mmol/L    Chloride 107 95 - 110 mmol/L    CO2 20 (L) 23 - 29 mmol/L    Glucose 141 (H) 70 - 110 mg/dL    BUN 11 8 - 23 mg/dL    Creatinine 1.0 0.5 - 1.4 mg/dL    Calcium 10.2 8.7 - 10.5 mg/dL    Protein Total 7.6 6.0 - 8.4 gm/dL    Albumin 4.0 3.5 - 5.2 g/dL    Bilirubin Total 0.9 0.1 - 1.0 mg/dL    ALP 74 40 - 150 unit/L    AST 27 11 - 45 unit/L    ALT 21 10 - 44 unit/L    Anion Gap 9 8 - 16 mmol/L    eGFR 56 (L) >60 mL/min/1.73/m2   Lipase    Collection Time: 03/28/25  5:15 PM   Result Value Ref Range    Lipase Level 23 4 - 60 U/L   CBC with Differential    Collection Time: 03/28/25  5:15 PM   Result Value Ref Range    WBC 5.52 3.90 - 12.70 K/uL    RBC 3.36 (L) 4.00 - 5.40 M/uL    HGB 11.5 (L) 12.0 - 16.0 gm/dL    HCT 35.5 (L) 37.0 - 48.5 %     (H) 82 - 98 fL    MCH 34.2 27.0 - 50.0 pg    MCHC 32.4 32.0 - 36.0 g/dL    RDW 13.7 11.5 - 14.5 %    Platelet Count 93 (L) 150 - 450 K/uL    MPV 10.5 9.2 - 12.9 fL    Nucleated RBC 0 <=0 /100 WBC    Neut % 56.7 38 - 73 %    Lymph % 29.2 18 - 48 %    Mono % 10.3 4 - 15 %    Eos % 2.9 <=8 %    Basophil % 0.5 <=1.9 %    Imm Grans % 0.4 0.0 - 0.5 %    Neut # 3.13 1.8 - 7.7 K/uL    Lymph # 1.61 1 - 4.8 K/uL    Mono # 0.57 0.3 - 1 K/uL    Eos # 0.16 <=0.5 K/uL    Baso # 0.03 <=0.2 K/uL    Imm Grans # 0.02 0.00 - 0.04 K/uL   Hepatitis C Antibody    Collection Time: 03/28/25  5:15 PM   Result Value Ref Range    Hep C Ab Interp Non-Reactive Non-Reactive   HIV 1/2 Ag/Ab (4th Gen)    Collection Time: 03/28/25  5:15 PM   Result Value Ref Range    HIV 1/2 Ag/Ab Non-Reactive Non-Reactive   Comprehensive Metabolic Panel (CMP)    Collection Time: 03/29/25  5:37 AM   Result Value Ref Range    Sodium 135 (L) 136 - 145 mmol/L    Potassium 4.5 3.5 - 5.1 mmol/L    Chloride 106 95 - 110 mmol/L    CO2 20 (L) 23 - 29 mmol/L    Glucose 135 (H) 70 - 110 mg/dL    BUN 10 8 - 23 mg/dL    Creatinine 1.1 0.5 - 1.4  mg/dL    Calcium 9.7 8.7 - 10.5 mg/dL    Protein Total 6.7 6.0 - 8.4 gm/dL    Albumin 3.4 (L) 3.5 - 5.2 g/dL    Bilirubin Total 1.5 (H) 0.1 - 1.0 mg/dL    ALP 94 40 - 150 unit/L     (H) 11 - 45 unit/L    ALT 75 (H) 10 - 44 unit/L    Anion Gap 9 8 - 16 mmol/L    eGFR 50 (L) >60 mL/min/1.73/m2   Magnesium    Collection Time: 03/29/25  5:37 AM   Result Value Ref Range    Magnesium  1.4 (L) 1.6 - 2.6 mg/dL   Phosphorus    Collection Time: 03/29/25  5:37 AM   Result Value Ref Range    Phosphorus Level 3.5 2.7 - 4.5 mg/dL   Hemoglobin A1c    Collection Time: 03/29/25  5:37 AM   Result Value Ref Range    Hemoglobin A1c 5.7 (H) 4.0 - 5.6 %    Estimated Average Glucose 117 68 - 131 mg/dL   CBC with Differential    Collection Time: 03/29/25  5:37 AM   Result Value Ref Range    WBC 6.67 3.90 - 12.70 K/uL    RBC 3.23 (L) 4.00 - 5.40 M/uL    HGB 11.2 (L) 12.0 - 16.0 gm/dL    HCT 34.8 (L) 37.0 - 48.5 %     (H) 82 - 98 fL    MCH 34.7 27.0 - 50.0 pg    MCHC 32.2 32.0 - 36.0 g/dL    RDW 13.8 11.5 - 14.5 %    Platelet Count 80 (L) 150 - 450 K/uL    MPV 10.5 9.2 - 12.9 fL    Nucleated RBC 0 <=0 /100 WBC    Neut % 65.8 38 - 73 %    Lymph % 21.3 18 - 48 %    Mono % 9.3 4 - 15 %    Eos % 3.0 <=8 %    Basophil % 0.3 <=1.9 %    Imm Grans % 0.3 0.0 - 0.5 %    Neut # 4.39 1.8 - 7.7 K/uL    Lymph # 1.42 1 - 4.8 K/uL    Mono # 0.62 0.3 - 1 K/uL    Eos # 0.20 <=0.5 K/uL    Baso # 0.02 <=0.2 K/uL    Imm Grans # 0.02 0.00 - 0.04 K/uL   POCT glucose    Collection Time: 03/29/25 12:23 PM   Result Value Ref Range    POCT Glucose 104 70 - 110 mg/dL   POCT glucose    Collection Time: 03/29/25  5:47 PM   Result Value Ref Range    POCT Glucose 102 70 - 110 mg/dL   POCT glucose    Collection Time: 03/29/25 11:50 PM   Result Value Ref Range    POCT Glucose 95 70 - 110 mg/dL   POCT glucose    Collection Time: 03/30/25  4:24 AM   Result Value Ref Range    POCT Glucose 101 70 - 110 mg/dL   Comprehensive Metabolic Panel (CMP)    Collection Time:  03/30/25  4:44 AM   Result Value Ref Range    Sodium 136 136 - 145 mmol/L    Potassium 4.0 3.5 - 5.1 mmol/L    Chloride 105 95 - 110 mmol/L    CO2 21 (L) 23 - 29 mmol/L    Glucose 99 70 - 110 mg/dL    BUN 9 8 - 23 mg/dL    Creatinine 0.7 0.5 - 1.4 mg/dL    Calcium 9.1 8.7 - 10.5 mg/dL    Protein Total 6.1 6.0 - 8.4 gm/dL    Albumin 3.1 (L) 3.5 - 5.2 g/dL    Bilirubin Total 1.4 (H) 0.1 - 1.0 mg/dL    ALP 93 40 - 150 unit/L    AST 73 (H) 11 - 45 unit/L    ALT 62 (H) 10 - 44 unit/L    Anion Gap 10 8 - 16 mmol/L    eGFR >60 >60 mL/min/1.73/m2   Magnesium    Collection Time: 03/30/25  4:44 AM   Result Value Ref Range    Magnesium  1.3 (L) 1.6 - 2.6 mg/dL   Phosphorus    Collection Time: 03/30/25  4:44 AM   Result Value Ref Range    Phosphorus Level 4.0 2.7 - 4.5 mg/dL   CBC with Differential    Collection Time: 03/30/25  4:44 AM   Result Value Ref Range    WBC 4.05 3.90 - 12.70 K/uL    RBC 3.00 (L) 4.00 - 5.40 M/uL    HGB 10.3 (L) 12.0 - 16.0 gm/dL    HCT 31.0 (L) 37.0 - 48.5 %     (H) 82 - 98 fL    MCH 34.3 27.0 - 50.0 pg    MCHC 33.2 32.0 - 36.0 g/dL    RDW 13.3 11.5 - 14.5 %    Platelet Count 61 (L) 150 - 450 K/uL    MPV 10.8 9.2 - 12.9 fL    Nucleated RBC 0 <=0 /100 WBC    Neut % 59.7 38 - 73 %    Lymph % 27.2 18 - 48 %    Mono % 9.4 4 - 15 %    Eos % 3.0 <=8 %    Basophil % 0.2 <=1.9 %    Imm Grans % 0.5 0.0 - 0.5 %    Neut # 2.42 1.8 - 7.7 K/uL    Lymph # 1.10 1 - 4.8 K/uL    Mono # 0.38 0.3 - 1 K/uL    Eos # 0.12 <=0.5 K/uL    Baso # 0.01 <=0.2 K/uL    Imm Grans # 0.02 0.00 - 0.04 K/uL   POCT glucose    Collection Time: 03/30/25 11:46 AM   Result Value Ref Range    POCT Glucose 111 (H) 70 - 110 mg/dL   POCT glucose    Collection Time: 03/30/25  6:02 PM   Result Value Ref Range    POCT Glucose 188 (H) 70 - 110 mg/dL   POCT glucose    Collection Time: 03/30/25 11:55 PM   Result Value Ref Range    POCT Glucose 112 (H) 70 - 110 mg/dL   POCT glucose    Collection Time: 03/31/25  6:25 AM   Result Value Ref Range     POCT Glucose 133 (H) 70 - 110 mg/dL   POCT glucose    Collection Time: 03/31/25  6:29 AM   Result Value Ref Range    POCT Glucose 130 (H) 70 - 110 mg/dL   Comprehensive Metabolic Panel (CMP)    Collection Time: 03/31/25  6:40 AM   Result Value Ref Range    Sodium 139 136 - 145 mmol/L    Potassium 3.7 3.5 - 5.1 mmol/L    Chloride 106 95 - 110 mmol/L    CO2 23 23 - 29 mmol/L    Glucose 129 (H) 70 - 110 mg/dL    BUN 7 (L) 8 - 23 mg/dL    Creatinine 0.8 0.5 - 1.4 mg/dL    Calcium 8.9 8.7 - 10.5 mg/dL    Protein Total 6.3 6.0 - 8.4 gm/dL    Albumin 3.1 (L) 3.5 - 5.2 g/dL    Bilirubin Total 1.1 (H) 0.1 - 1.0 mg/dL    ALP 95 40 - 150 unit/L    AST 45 11 - 45 unit/L    ALT 47 (H) 10 - 44 unit/L    Anion Gap 10 8 - 16 mmol/L    eGFR >60 >60 mL/min/1.73/m2   Magnesium    Collection Time: 03/31/25  6:40 AM   Result Value Ref Range    Magnesium  1.5 (L) 1.6 - 2.6 mg/dL   Phosphorus    Collection Time: 03/31/25  6:40 AM   Result Value Ref Range    Phosphorus Level 3.5 2.7 - 4.5 mg/dL   CBC with Differential    Collection Time: 03/31/25  6:40 AM   Result Value Ref Range    WBC 3.64 (L) 3.90 - 12.70 K/uL    RBC 2.91 (L) 4.00 - 5.40 M/uL    HGB 10.1 (L) 12.0 - 16.0 gm/dL    HCT 29.9 (L) 37.0 - 48.5 %     (H) 82 - 98 fL    MCH 34.7 27.0 - 50.0 pg    MCHC 33.8 32.0 - 36.0 g/dL    RDW 13.2 11.5 - 14.5 %    Platelet Count 63 (L) 150 - 450 K/uL    MPV 11.1 9.2 - 12.9 fL    Nucleated RBC 0 <=0 /100 WBC    Neut % 54.2 38 - 73 %    Lymph % 30.5 18 - 48 %    Mono % 11.5 4 - 15 %    Eos % 3.0 <=8 %    Basophil % 0.5 <=1.9 %    Imm Grans % 0.3 0.0 - 0.5 %    Neut # 1.97 1.8 - 7.7 K/uL    Lymph # 1.11 1 - 4.8 K/uL    Mono # 0.42 0.3 - 1 K/uL    Eos # 0.11 <=0.5 K/uL    Baso # 0.02 <=0.2 K/uL    Imm Grans # 0.01 0.00 - 0.04 K/uL   Uric Acid    Collection Time: 03/31/25 12:07 PM   Result Value Ref Range    Uric Acid 4.0 2.4 - 5.7 mg/dL   Sedimentation rate    Collection Time: 03/31/25 12:07 PM   Result Value Ref Range    Sed Rate 25  <=36 mm/hr   C-Reactive Protein    Collection Time: 03/31/25 12:07 PM   Result Value Ref Range    CRP 10.2 (H) <=8.2 mg/L   POCT glucose    Collection Time: 03/31/25 12:19 PM   Result Value Ref Range    POCT Glucose 254 (H) 70 - 110 mg/dL   Urinalysis, Reflex to Urine Culture    Collection Time: 05/07/25  3:33 PM    Specimen: Urine   Result Value Ref Range    Color, UA Yellow Straw, Vangie, Yellow, Light-Orange    Appearance, UA Clear Clear    pH, UA 5.0 5.0 - 8.0    Spec Grav UA 1.020 1.005 - 1.030    Protein, UA Negative Negative    Glucose, UA Negative Negative    Ketones, UA Negative Negative    Bilirubin, UA Negative Negative    Blood, UA Negative Negative    Nitrites, UA Negative Negative    Urobilinogen, UA Negative <2.0 EU/dL    Leukocyte Esterase, UA Negative Negative   GREY TOP URINE HOLD    Collection Time: 05/07/25  3:33 PM   Result Value Ref Range    Extra Tube Hold for add-ons.    COMPREHENSIVE METABOLIC PANEL    Collection Time: 05/20/25  2:21 PM   Result Value Ref Range    Sodium 137 136 - 145 mmol/L    Potassium 4.1 3.5 - 5.1 mmol/L    Chloride 104 95 - 110 mmol/L    CO2 21 (L) 23 - 29 mmol/L    Glucose 165 (H) 70 - 110 mg/dL    BUN 13 8 - 23 mg/dL    Creatinine 1.1 0.5 - 1.4 mg/dL    Calcium 9.6 8.7 - 10.5 mg/dL    Protein Total 6.7 6.0 - 8.4 gm/dL    Albumin 3.5 3.5 - 5.2 g/dL    Bilirubin Total 0.7 0.1 - 1.0 mg/dL    ALP 71 40 - 150 unit/L    AST 24 11 - 45 unit/L    ALT 21 10 - 44 unit/L    Anion Gap 12 8 - 16 mmol/L    eGFR 50 (L) >60 mL/min/1.73/m2   TSH    Collection Time: 05/20/25  2:21 PM   Result Value Ref Range    TSH 2.977 0.400 - 4.000 uIU/mL   Hemoglobin A1C    Collection Time: 05/20/25  2:21 PM   Result Value Ref Range    Hemoglobin A1c 6.1 (H) 4.0 - 5.6 %    Estimated Average Glucose 128 68 - 131 mg/dL   Protime-INR    Collection Time: 05/20/25  2:21 PM   Result Value Ref Range    PT 11.7 9.0 - 12.5 seconds    INR 1.1 0.8 - 1.2   AFP Tumor Marker    Collection Time: 05/20/25  2:21 PM    Result Value Ref Range    AFP 3.8 <=8.4 ng/mL   CBC with Differential    Collection Time: 05/20/25  2:21 PM   Result Value Ref Range    WBC 5.15 3.90 - 12.70 K/uL    RBC 2.97 (L) 4.00 - 5.40 M/uL    HGB 10.2 (L) 12.0 - 16.0 gm/dL    HCT 31.2 (L) 37.0 - 48.5 %     (H) 82 - 98 fL    MCH 34.3 (H) 27.0 - 31.0 pg    MCHC 32.7 32.0 - 36.0 g/dL    RDW 14.0 11.5 - 14.5 %    Platelet Count 76 (L) 150 - 450 K/uL    MPV 11.0 9.2 - 12.9 fL    Nucleated RBC 0 <=0 /100 WBC    Neut % 60.3 38 - 73 %    Lymph % 27.2 18 - 48 %    Mono % 7.6 4 - 15 %    Eos % 4.1 <=8 %    Basophil % 0.6 <=1.9 %    Imm Grans % 0.2 0.0 - 0.5 %    Neut # 3.11 1.8 - 7.7 K/uL    Lymph # 1.40 1 - 4.8 K/uL    Mono # 0.39 0.3 - 1 K/uL    Eos # 0.21 <=0.5 K/uL    Baso # 0.03 <=0.2 K/uL    Imm Grans # 0.01 0.00 - 0.04 K/uL   Iron and TIBC    Collection Time: 05/20/25  2:21 PM   Result Value Ref Range    Iron Level 115 30 - 160 ug/dL    Transferrin 267 200 - 375 mg/dL    Iron Binding Capacity Total 395 250 - 450 ug/dL    Iron Saturation 29 20 - 50 %   Ferritin    Collection Time: 05/20/25  2:21 PM   Result Value Ref Range    Ferritin 155.0 20.0 - 300.0 ng/mL   Vitamin B12    Collection Time: 05/20/25  2:21 PM   Result Value Ref Range    Vitamin B12 319 210 - 950 pg/mL   Folate    Collection Time: 05/20/25  2:21 PM   Result Value Ref Range    Folate Level 6.4 4.0 - 24.0 ng/mL   Magnesium    Collection Time: 05/20/25  2:21 PM   Result Value Ref Range    Magnesium  1.1 (L) 1.6 - 2.6 mg/dL   CK    Collection Time: 05/20/25  2:21 PM   Result Value Ref Range    CPK 49 20 - 180 U/L   Lipid Panel    Collection Time: 05/20/25  2:21 PM   Result Value Ref Range    Cholesterol Total 186 120 - 199 mg/dL    Triglyceride 156 (H) 30 - 150 mg/dL    HDL Cholesterol 62 40 - 75 mg/dL    LDL Cholesterol 92.8 63.0 - 159.0 mg/dL    HDL/Cholesterol Ratio 33.3 20.0 - 50.0 %    Cholesterol/HDL Ratio 3.0 2.0 - 5.0    Non HDL Cholesterol 124 mg/dL   Bilirubin, Direct    Collection  Time: 05/20/25  2:21 PM   Result Value Ref Range    Bilirubin Direct 0.3 0.1 - 0.3 mg/dL   Hepatic Function Panel    Collection Time: 05/20/25  2:22 PM   Result Value Ref Range    Protein Total 6.6 6.0 - 8.4 gm/dL    Albumin 3.5 3.5 - 5.2 g/dL    Bilirubin Total 0.7 0.1 - 1.0 mg/dL    Bilirubin Direct 0.3 0.1 - 0.3 mg/dL    ALP 72 40 - 150 unit/L    AST 24 11 - 45 unit/L    ALT 18 10 - 44 unit/L          Assessment/Plan:     1) Annual wellness exam  2) T2DM - Controlled. Agreed on trying stopping Metformin given desire to reduce meds and also hx of intermittent diarrhea. Continuing Tradjenta. Repeat A1c 6 mths.  3) Recurrent SBO hx, Adhesions - Surgery not possible safely when attempted in past. Managing as episodes occur.  4) Cirrhosis - Stable. Following with Hepatology.  5) Hypothyroidism - Controlled. TSH repeat 6 to 12 mth.  6) MDS, Anemia - Follows with Heme. Stable.  7) Right epistaxis - ENT referral for eval. Has had this issue in past.  8) Left ear discomfort - Does have TMJ hx. Some wax present. ENT referral.  9) GERD - Continuing PPI.  10) Low back pain, Left hip pain - Known lumbar DJD but possibly SI and bursitis contributions as well. Pain Mgmt referral to consider SI and/or trochanteric CSI. Gabapentin 600 qhs.    - BP normal.    Will cut back med list as we can. Today stopping Metformin to start. Palliative discussed with Hepatology recently but patient's focus seems to be largely on limiting meds. I will essentially start the palliative approach by cutting med list to pressing needs which will largely be pain control, sleep.         [1]   Current Outpatient Medications:     acetaminophen (TYLENOL) 650 MG TbSR, Take 1,300 mg by mouth 2 (two) times a day., Disp: , Rfl:     ascorbic acid, vitamin C, (VITAMIN C) 1000 MG tablet, Take 1,000 mg by mouth once daily., Disp: , Rfl:     azelastine (ASTELIN) 137 mcg (0.1 %) nasal spray, 1 spray (137 mcg total) by Nasal route 2 (two) times daily., Disp: 30 mL,  Rfl: 11    biotin 10,000 mcg TbDL, Take 1 tablet by mouth once daily. , Disp: , Rfl:     blood sugar diagnostic (FREESTYLE LITE STRIPS) Strp, 1 strip by Misc.(Non-Drug; Combo Route) route 3 (three) times daily., Disp: 200 strip, Rfl: 5    blood-glucose meter kit, Use as instructed, Disp: 1 each, Rfl: 0    calcium-vitamin D3 (OS-KASSANDRA 500 + D3) 500 mg-5 mcg (200 unit) per tablet, Take 1 tablet by mouth once daily., Disp: , Rfl:     cranberry extract 200 mg Cap, Take 1 capsule by mouth once daily., Disp: , Rfl:     diclofenac sodium (VOLTAREN) 1 % Gel, Apply 2 g topically 2 (two) times daily. Apply to affected area as needed for pain., Disp: 100 g, Rfl: 3    donepeziL (ARICEPT) 10 MG tablet, TAKE 1 TABLET(10 MG) BY MOUTH EVERY EVENING, Disp: 90 tablet, Rfl: 3    DULoxetine (CYMBALTA) 30 MG capsule, Take 1 capsule (30 mg total) by mouth 2 (two) times daily., Disp: 180 capsule, Rfl: 3    flash glucose scanning reader (FREESTYLE EFREN 14 DAY READER) Misc, Check blood glucose level 3 times daily., Disp: 1 each, Rfl: 0    flash glucose sensor (FREESTYLE EFREN 14 DAY SENSOR) Kit, APPLY 1 SENSOR EVERY 14 DAYS, Disp: 6 kit, Rfl: 3    gabapentin (NEURONTIN) 300 MG capsule, Take 1 to 2 capsules 3 times daily if needed for leg pain., Disp: 180 capsule, Rfl: 5    hydrocortisone 2.5 % cream, Apply topically 2 (two) times daily as needed., Disp: 1 each, Rfl: 1    lactulose (CHRONULAC) 20 gram/30 mL Soln, Take 30 mLs (20 g total) by mouth 3 (three) times daily as needed (Constipation)., Disp: 300 mL, Rfl: 0    lancets (FREESTYLE LANCETS) 28 gauge Misc, 30 lancets by Misc.(Non-Drug; Combo Route) route Daily., Disp: 30 each, Rfl: 3    levothyroxine (SYNTHROID) 75 MCG tablet, TAKE 1 TABLET BY MOUTH BEFORE  BREAKFAST, Disp: 90 tablet, Rfl: 3    LIDOcaine (LIDODERM) 5 %, APPLY 1 TO 3 PATCHES TO BACK EVERY DAY. REMOVE WITHIN 12 HOURS, Disp: 30 patch, Rfl: 2    linaGLIPtin (TRADJENTA) 5 mg Tab tablet, TAKE 1 TABLET(5 MG) BY MOUTH EVERY DAY,  Disp: 90 tablet, Rfl: 3    magnesium 30 mg Tab, Take 30 mg by mouth once. Patient does not know actual dose in MG, Disp: , Rfl:     magnesium citrate solution, Take 300 mLs by mouth., Disp: , Rfl:     metFORMIN (GLUCOPHAGE) 1000 MG tablet, TAKE 1 TABLET(1000 MG) BY MOUTH TWICE DAILY WITH MEALS, Disp: 180 tablet, Rfl: 3    mometasone (ELOCON) 0.1 % ointment, Apply topically 2 (two) times a day. Mix 50/50 with mupirocin ointment. Apply in each nostril twice daily., Disp: 15 g, Rfl: 1    naproxen (EC-NAPROSYN) 375 MG TbEC EC tablet, Take 375 mg by mouth 2 (two) times daily with meals., Disp: , Rfl:     ospemifene (OSPHENA) 60 mg Tab, Take by mouth., Disp: , Rfl:     pantoprazole (PROTONIX) 40 MG tablet, Take 1 tablet (40 mg total) by mouth 2 (two) times daily., Disp: 180 tablet, Rfl: 1    PREMARIN vaginal cream, INSERT 0.5 GRAM VAGINALLY 2 TIMES A WEEK, Disp: 30 g, Rfl: 3    rifAXIMin (XIFAXAN) 550 mg Tab, Take 1 tablet (550 mg total) by mouth 2 (two) times daily., Disp: 60 tablet, Rfl: 11    traZODone (DESYREL) 50 MG tablet, TAKE 1 TABLET(50 MG) BY MOUTH EVERY EVENING, Disp: 90 tablet, Rfl: 3    triamcinolone acetonide 0.1% (KENALOG) 0.1 % paste, 3 (three) times daily., Disp: , Rfl:     ubrogepant (UBRELVY) 100 mg tablet, Take 1 tablet daily as needed for migraine headache. May take 1 additional tablet 2 hours later if needed., Disp: 16 tablet, Rfl: 2    valACYclovir (VALTREX) 1000 MG tablet, Take 1 tablet (1,000 mg total) by mouth once daily., Disp: 90 tablet, Rfl: 1  [2]   Social History  Tobacco Use    Smoking status: Never    Smokeless tobacco: Never   Substance Use Topics    Alcohol use: Not Currently     Comment: socially, rarely    Drug use: Never

## 2025-05-30 ENCOUNTER — TELEPHONE (OUTPATIENT)
Dept: PAIN MEDICINE | Facility: CLINIC | Age: 80
End: 2025-05-30
Payer: MEDICARE

## 2025-05-30 ENCOUNTER — EXTERNAL HOME HEALTH (OUTPATIENT)
Dept: HOME HEALTH SERVICES | Facility: HOSPITAL | Age: 80
End: 2025-05-30
Payer: MEDICARE

## 2025-05-31 PROCEDURE — G0179 MD RECERTIFICATION HHA PT: HCPCS | Mod: ,,, | Performed by: INTERNAL MEDICINE

## 2025-06-02 ENCOUNTER — TELEPHONE (OUTPATIENT)
Dept: INTERNAL MEDICINE | Facility: CLINIC | Age: 80
End: 2025-06-02
Payer: MEDICARE

## 2025-06-02 ENCOUNTER — PATIENT MESSAGE (OUTPATIENT)
Dept: INTERNAL MEDICINE | Facility: CLINIC | Age: 80
End: 2025-06-02
Payer: MEDICARE

## 2025-06-02 ENCOUNTER — LAB VISIT (OUTPATIENT)
Dept: LAB | Facility: HOSPITAL | Age: 80
End: 2025-06-02
Payer: MEDICARE

## 2025-06-02 DIAGNOSIS — R30.0 DYSURIA: ICD-10-CM

## 2025-06-02 DIAGNOSIS — Z01.818 PRE-OP EVALUATION: ICD-10-CM

## 2025-06-02 DIAGNOSIS — R30.0 DYSURIA: Primary | ICD-10-CM

## 2025-06-02 LAB
BACTERIA #/AREA URNS AUTO: ABNORMAL /HPF
BILIRUB UR QL STRIP.AUTO: NEGATIVE
CLARITY UR: ABNORMAL
COLOR UR AUTO: YELLOW
GLUCOSE UR QL STRIP: NEGATIVE
HGB UR QL STRIP: ABNORMAL
HOLD SPECIMEN: NORMAL
KETONES UR QL STRIP: NEGATIVE
LEUKOCYTE ESTERASE UR QL STRIP: ABNORMAL
MICROSCOPIC COMMENT: ABNORMAL
NITRITE UR QL STRIP: NEGATIVE
PH UR STRIP: 7 [PH]
PROT UR QL STRIP: NEGATIVE
RBC #/AREA URNS AUTO: 11 /HPF (ref 0–4)
SP GR UR STRIP: 1.01
SQUAMOUS #/AREA URNS AUTO: <1 /HPF
UROBILINOGEN UR STRIP-ACNC: NEGATIVE EU/DL
WBC #/AREA URNS AUTO: >100 /HPF (ref 0–5)
WBC CLUMPS UR QL AUTO: ABNORMAL

## 2025-06-02 PROCEDURE — 81001 URINALYSIS AUTO W/SCOPE: CPT

## 2025-06-02 PROCEDURE — 87186 SC STD MICRODIL/AGAR DIL: CPT

## 2025-06-04 ENCOUNTER — TELEPHONE (OUTPATIENT)
Dept: INTERNAL MEDICINE | Facility: CLINIC | Age: 80
End: 2025-06-04
Payer: MEDICARE

## 2025-06-04 ENCOUNTER — TELEPHONE (OUTPATIENT)
Dept: PAIN MEDICINE | Facility: HOSPITAL | Age: 80
End: 2025-06-04
Payer: MEDICARE

## 2025-06-04 ENCOUNTER — TELEPHONE (OUTPATIENT)
Dept: PAIN MEDICINE | Facility: CLINIC | Age: 80
End: 2025-06-04
Payer: MEDICARE

## 2025-06-04 ENCOUNTER — RESULTS FOLLOW-UP (OUTPATIENT)
Dept: INTERNAL MEDICINE | Facility: CLINIC | Age: 80
End: 2025-06-04

## 2025-06-04 DIAGNOSIS — N39.0 URINARY TRACT INFECTION WITHOUT HEMATURIA, SITE UNSPECIFIED: Primary | ICD-10-CM

## 2025-06-04 LAB — BACTERIA UR CULT: ABNORMAL

## 2025-06-04 RX ORDER — NITROFURANTOIN 25; 75 MG/1; MG/1
100 CAPSULE ORAL 2 TIMES DAILY
Qty: 14 CAPSULE | Refills: 0 | Status: SHIPPED | OUTPATIENT
Start: 2025-06-04 | End: 2025-06-05

## 2025-06-05 ENCOUNTER — TELEPHONE (OUTPATIENT)
Dept: INTERNAL MEDICINE | Facility: CLINIC | Age: 80
End: 2025-06-05
Payer: MEDICARE

## 2025-06-05 DIAGNOSIS — N39.0 URINARY TRACT INFECTION WITHOUT HEMATURIA, SITE UNSPECIFIED: Primary | ICD-10-CM

## 2025-06-05 RX ORDER — CEFUROXIME AXETIL 500 MG/1
500 TABLET ORAL 2 TIMES DAILY
Qty: 14 TABLET | Refills: 0 | Status: SHIPPED | OUTPATIENT
Start: 2025-06-05 | End: 2025-06-12

## 2025-06-26 DIAGNOSIS — M54.9 BACK PAIN, UNSPECIFIED BACK LOCATION, UNSPECIFIED BACK PAIN LATERALITY, UNSPECIFIED CHRONICITY: ICD-10-CM

## 2025-06-26 RX ORDER — LIDOCAINE 50 MG/G
PATCH TOPICAL
Qty: 30 PATCH | Refills: 2 | Status: SHIPPED | OUTPATIENT
Start: 2025-06-26

## 2025-06-26 NOTE — TELEPHONE ENCOUNTER
No care due was identified.  [facility] Embedded Care Due Messages. Reference number: [id number].   6/26/2025 3:20:23 AM CDT

## 2025-06-26 NOTE — TELEPHONE ENCOUNTER
Refill Routing Note   Medication(s) are not appropriate for processing by [facility] for the following reason(s):        Outside of protocol    [facility] action(s):  Route               Appointments  past 12m or future 3m with PCP    Date Provider   Last Visit   5/20/2025 Darci Guthrie MD   Next Visit   Visit date not found Darci Guthrie MD   ED visits in past 90 days: 0        Note composed:3:30 AM 06/26/2025

## 2025-07-02 ENCOUNTER — PATIENT MESSAGE (OUTPATIENT)
Dept: INTERNAL MEDICINE | Facility: CLINIC | Age: 80
End: 2025-07-02
Payer: MEDICARE

## 2025-07-02 DIAGNOSIS — B02.9 HERPES ZOSTER WITHOUT COMPLICATION: ICD-10-CM

## 2025-07-02 RX ORDER — VALACYCLOVIR HYDROCHLORIDE 500 MG/1
500 TABLET, FILM COATED ORAL 2 TIMES DAILY
Qty: 180 TABLET | Refills: 1 | Status: SHIPPED | OUTPATIENT
Start: 2025-07-02

## 2025-07-02 RX ORDER — VALACYCLOVIR HYDROCHLORIDE 1 G/1
500 TABLET, FILM COATED ORAL 2 TIMES DAILY
Qty: 180 TABLET | Refills: 1 | Status: SHIPPED | OUTPATIENT
Start: 2025-07-02 | End: 2025-07-02 | Stop reason: SDUPTHER

## 2025-07-03 ENCOUNTER — TELEPHONE (OUTPATIENT)
Dept: PAIN MEDICINE | Facility: CLINIC | Age: 80
End: 2025-07-03
Payer: MEDICARE

## 2025-07-07 ENCOUNTER — TELEPHONE (OUTPATIENT)
Dept: PAIN MEDICINE | Facility: CLINIC | Age: 80
End: 2025-07-07
Payer: MEDICARE

## 2025-07-08 ENCOUNTER — TELEPHONE (OUTPATIENT)
Dept: PAIN MEDICINE | Facility: CLINIC | Age: 80
End: 2025-07-08
Payer: MEDICARE

## 2025-07-10 ENCOUNTER — TELEPHONE (OUTPATIENT)
Dept: PAIN MEDICINE | Facility: CLINIC | Age: 80
End: 2025-07-10
Payer: MEDICARE

## 2025-07-10 NOTE — TELEPHONE ENCOUNTER
Staff spoke with patient and gave her the [place] location procedure phone number for arrival time.

## 2025-07-11 ENCOUNTER — TELEPHONE (OUTPATIENT)
Dept: PAIN MEDICINE | Facility: CLINIC | Age: 80
End: 2025-07-11
Payer: MEDICARE

## 2025-07-11 ENCOUNTER — TELEPHONE (OUTPATIENT)
Dept: INTERNAL MEDICINE | Facility: CLINIC | Age: 80
End: 2025-07-11
Payer: MEDICARE

## 2025-07-11 ENCOUNTER — HOSPITAL ENCOUNTER (OUTPATIENT)
Facility: HOSPITAL | Age: 80
Discharge: HOME | End: 2025-07-11
Attending: STUDENT IN AN ORGANIZED HEALTH CARE EDUCATION/TRAINING PROGRAM | Admitting: STUDENT IN AN ORGANIZED HEALTH CARE EDUCATION/TRAINING PROGRAM
Payer: MEDICARE

## 2025-07-11 DIAGNOSIS — E11.51 TYPE 2 DIABETES MELLITUS WITH DIABETIC PERIPHERAL ANGIOPATHY WITHOUT GANGRENE, WITHOUT LONG-TERM CURRENT USE OF INSULIN: Primary | ICD-10-CM

## 2025-07-11 DIAGNOSIS — M46.1 BILATERAL SACROILIITIS: Primary | ICD-10-CM

## 2025-07-11 DIAGNOSIS — R35.0 URINARY FREQUENCY: ICD-10-CM

## 2025-07-11 DIAGNOSIS — K59.00 CONSTIPATION, UNSPECIFIED CONSTIPATION TYPE: ICD-10-CM

## 2025-07-11 LAB
GLUCOSE SERPL-MCNC: 241 MG/DL (ref 70–110)
POCT GLUCOSE: 241 MG/DL (ref 70–110)

## 2025-07-11 PROCEDURE — 82962 GLUCOSE BLOOD TEST: CPT | Performed by: STUDENT IN AN ORGANIZED HEALTH CARE EDUCATION/TRAINING PROGRAM

## 2025-07-11 RX ORDER — LIDOCAINE HYDROCHLORIDE 10 MG/ML
1 INJECTION, SOLUTION EPIDURAL; INFILTRATION; INTRACAUDAL; PERINEURAL ONCE
Status: DISCONTINUED | OUTPATIENT
Start: 2025-07-11 | End: 2025-07-11 | Stop reason: HOSPADM

## 2025-07-11 RX ORDER — METFORMIN HYDROCHLORIDE 1000 MG/1
1000 TABLET, EXTENDED RELEASE ORAL
Qty: 90 TABLET | Refills: 3 | Status: SHIPPED | OUTPATIENT
Start: 2025-07-11 | End: 2026-07-11

## 2025-07-11 RX ORDER — SODIUM CHLORIDE 9 MG/ML
500 INJECTION, SOLUTION INTRAVENOUS CONTINUOUS
Status: DISCONTINUED | OUTPATIENT
Start: 2025-07-11 | End: 2025-07-11 | Stop reason: HOSPADM

## 2025-07-11 RX ORDER — METFORMIN HYDROCHLORIDE 1000 MG/1
1000 TABLET, EXTENDED RELEASE ORAL
Qty: 90 TABLET | Refills: 3 | Status: SHIPPED | OUTPATIENT
Start: 2025-07-11 | End: 2025-07-11 | Stop reason: SDUPTHER

## 2025-07-11 RX ORDER — GLIPIZIDE 5 MG/1
5 TABLET ORAL
Qty: 90 TABLET | Refills: 0 | Status: SHIPPED | OUTPATIENT
Start: 2025-07-11

## 2025-07-11 NOTE — H&P (VIEW-ONLY)
HPI  Anayeli Judd presents for Procedure(s):  B/L SI Joint inj    Recent anticoagulation/antiplatelets reviewed and verified with nursing.  Recent antibiotics/recent infections reviewed and verified with nursing.    Case cancelled due to blood glucose > 240    Past Medical History:   Diagnosis Date    Abdominal pain     Allergic rhinitis     Arthritis     Blood platelet disorder     Blood platelet disorder     Blood transfusion     during delivery and     Bowel obstruction     Cervical radiculopathy     followed by dr cloud    Cirrhosis of liver     Colon cancer     transverse colon; resected; Stage IIA (pT3 pN0 MX)    Degenerative joint disease (DJD) of lumbar spine     Diabetes mellitus     Diarrhea     Falls 2020    Family history of breast cancer     Family history of colon cancer     Fatty liver     GERD (gastroesophageal reflux disease)     History of shingles     Hyperlipidemia     Hypomagnesemia     Hypothyroidism     Irritable bowel syndrome     Microscopic colitis     treated     Migraine     Myelodysplastic syndrome     Raynaud phenomenon     Raynaud's disease     Squamous cell carcinoma of skin     Type 2 diabetes mellitus     Usual hyperplasia of lactiferous duct      Past Surgical History:   Procedure Laterality Date    ADENOIDECTOMY      APPENDECTOMY      BACK SURGERY      BREAST BIOPSY  1970    BREAST CYST EXCISION  1970?    BREAST LUMPECTOMY  1970    CARPAL TUNNEL RELEASE      bilateral      SECTION      CHOLECYSTECTOMY  1965    COLECTOMY  2011    Transverse colon resection by Dr. Aguirre    COLONOSCOPY N/A 2017    Procedure: COLONOSCOPY;  Surgeon: Manjit Alvarez MD;  Location: [address]);  Service: Endoscopy;  Laterality: N/A;    COLONOSCOPY N/A 2019    Procedure: COLONOSCOPY;  Surgeon: Ramiro Jefferson MD;  Location: [address]);  Service: Endoscopy;  Laterality: N/A;  PM prep    COLONOSCOPY N/A 2022    Procedure:  COLONOSCOPY;  Surgeon: Ramiro Jefferson MD;  Location: [facility] (2ND FLR);  Service: Endoscopy;  Laterality: N/A;  EGD and colonoscopy in 6-8 weeks from now     states has constipation. -extended Golytely prep    CYSTOSCOPY N/A 11/09/2021    Procedure: CYSTOSCOPY;  Surgeon: Viridiana Valenzuela MD;  Location: [facility] OR 1ST FLR;  Service: Urology;  Laterality: N/A;    ENDOSCOPIC ULTRASOUND OF UPPER GASTROINTESTINAL TRACT N/A 12/12/2018    Procedure: ULTRASOUND, UPPER GI TRACT, ENDOSCOPIC;  Surgeon: Jose Hess MD;  Location: [facility];  Service: Endoscopy;  Laterality: N/A;    ENDOSCOPIC ULTRASOUND OF UPPER GASTROINTESTINAL TRACT N/A 01/23/2019    Procedure: ULTRASOUND, UPPER GI TRACT, ENDOSCOPIC;  Surgeon: Jose Hess MD;  Location: [facility] (2ND FLR);  Service: Endoscopy;  Laterality: N/A;    ENDOSCOPIC ULTRASOUND OF UPPER GASTROINTESTINAL TRACT N/A 10/25/2023    Procedure: ULTRASOUND, UPPER GI TRACT, ENDOSCOPIC;  Surgeon: Jose Campos MD;  Location: [facility];  Service: Endoscopy;  Laterality: N/A;  10/20/23: instructions sent via portal-GD    ESOPHAGOGASTRODUODENOSCOPY N/A 11/16/2018    Procedure: EGD (ESOPHAGOGASTRODUODENOSCOPY);  Surgeon: Angelo Reynolds MD;  Location: [facility] (2ND FLR);  Service: Endoscopy;  Laterality: N/A;    ESOPHAGOGASTRODUODENOSCOPY N/A 06/26/2019    Procedure: EGD (ESOPHAGOGASTRODUODENOSCOPY);  Surgeon: Ramiro Jfeferson MD;  Location: [facility] (4TH FLR);  Service: Endoscopy;  Laterality: N/A;    ESOPHAGOGASTRODUODENOSCOPY N/A 05/04/2022    Procedure: EGD (ESOPHAGOGASTRODUODENOSCOPY);  Surgeon: Ramiro Jefferson MD;  Location: [facility] (2ND FLR);  Service: Endoscopy;  Laterality: N/A;  fully vaccinated-GT    ESOPHAGOGASTRODUODENOSCOPY N/A 10/25/2023    Procedure: EGD (ESOPHAGOGASTRODUODENOSCOPY);  Surgeon: Jose Campos MD;  Location: [facility];  Service: Endoscopy;  Laterality: N/A;    ESOPHAGOGASTRODUODENOSCOPY N/A 11/15/2023    Procedure:  ESOPHAGOGASTRODUODENOSCOPY (EGD);  Surgeon: Db Sanchez MD;  Location: [facility] ENDO (4TH FLR);  Service: Endoscopy;  Laterality: N/A;  New diagnosis of esophageal varices. Not a candidate for beta blocker due to soft blood pressures. Recommend EV banding.  PT/INR done on 11/3/23 and CBC done on 9/30/23  ok per Dr. Espinoza to be done by 1st available provider-see tele encounter-st  1    EYE SURGERY      Cataract Removal    FLUOROSCOPIC URODYNAMIC STUDY N/A 11/09/2021    Procedure: URODYNAMIC STUDY, FLUOROSCOPIC;  Surgeon: Viridiana Valenzuela MD;  Location: [facility] OR 1ST FLR;  Service: Urology;  Laterality: N/A;  90 minutes     HYSTERECTOMY      JOINT REPLACEMENT      OOPHORECTOMY  1970    posterolateral fusion with autograft bone and Eun mineralized bone matrix  02/01/2013    at [facility] for lumbar spine stenosis    SMALL INTESTINE SURGERY      SPINE SURGERY      TOE SURGERY      TONSILLECTOMY      TRANSFORAMINAL EPIDURAL INJECTION OF STEROID Bilateral 12/21/2022    Procedure: Injection,steroid,epidural, Todd L5/S1 TF CONTRAST DIRECT REFERRAL;  Surgeon: Toni Osorio MD;  Location: [facility] PAIN MGT;  Service: Pain Management;  Laterality: Bilateral;    TRIGGER FINGER RELEASE       Review of patient's allergies indicates:   Allergen Reactions    Codeine Itching and Nausea And Vomiting    Dilaudid [hydromorphone (bulk)] Other (See Comments)     Oversedating, head burning. Pt prefers to avoid.       Percocet [oxycodone-acetaminophen] Itching    Sulfa (sulfonamide antibiotics) Itching and Nausea And Vomiting    Dilaudid [hydromorphone]     Latex, natural rubber Rash    Opioids - morphine analogues Rash        PMHx, PSHx, Allergies, Medications reviewed in epic      ROS negative except pain complaints in HPI    OBJECTIVE:    LMP  (LMP Unknown)     PHYSICAL EXAMINATION:    GENERAL: Well appearing, in no acute distress, alert and oriented to name, situation, location.  PSYCH:  Mood and affect appropriate.  SKIN: Skin color,  texture, turgor normal, no rashes or lesions at site of procedure.  CV: regular rate with palpation of the radial artery.  PULM: No evidence of respiratory difficulty, symmetric chest rise. No audible abnormal respiratory sounds.  NEURO: Cranial nerves grossly intact.    Plan:    Case cancelled due to blood glucose > 240    Charly Mahmood  07/11/2025

## 2025-07-11 NOTE — DISCHARGE SUMMARY
[facility]  Discharge Note  Short Stay    Procedure(s) (LRB):  B/L SI Joint inj (Bilateral) - Cancelled

## 2025-07-11 NOTE — TELEPHONE ENCOUNTER
Was not able to get injection with Pain Mgmt due to hyperglycemia (around 250). Discussed with family. Still on Tradjenta. Restarting Metformin at 1000 mg XR formulation daily.     Pt would like to see Urology again. Seen in past for frequency but no f/u since 2022. She denies feelings of UTI that she has had in past. Not an acute issue.

## 2025-07-11 NOTE — DISCHARGE INSTRUCTIONS
[facility]  Dr. Charly Mahmood  Messaging service # [phone]    POST-PROCEDURE INSTRUCTIONS:  HELPFUL TIPS:    Except for block procedures (medial branch blocks, genicular blocks, hip/shoulder blocks), most procedures take about 2 weeks to start seeing the full effect toward your pain.  If you feel like the pain relief goes away after a day, please wait up to 2 weeks to decide if the procedure provided you any relief    If you had a block procedure (medial branch blocks, genicular blocks, hip/shoulder blocks), you MUST submit your pain diary and percentage relief scores to proceed with the next block and/or procedure.  Please submit the diary/percentages through the [facility] OR you can call [phone] ("[employer] OR [phone] ([employer]) during clinic hours (Monday - Friday, 8AM - 5PM)    Follow up in 2 weeks with either the provider that suggested this procedure OR with Dr. Mahmood (including his team members at [facility]).  You may already have a follow up appointment scheduled.  If not, you may make an appointment through the [facility] or you can contact the office that suggested your procedure.  If you would like to make an appointment with Dr. Mahmood, you may do so through the [facility] OR you can call [phone] ("[employer] OR [phone] ([employer]) during clinic hours (Monday - Friday, 8AM - 5PM).      What you need to do:    Keep a record of your response to the injection you had today.    How much relief did you get?   When did the relief start and how long did it last?  Were you able to decrease the use of any of your pain medications?  Were you able to increase your level of activity?  How long did the relief last?    What to watch out for:    If you experience any of the following symptoms after your procedure, please notify the messaging service immediately (see above for contact information):   fever (increased oral temperature)   bleeding or  swelling at the injection site,    drainage, rash or redness at the injection site    possible signs of infection    increased pain at the injection site   worsening of your usual pain   severe headache   new or worsening numbness    new arm and/or leg weakness, or    changes in bowel and/or bladder function: urinating or defecating on yourself and not knowing that you did it.    PLEASE FOLLOW ALL INSTRUCTIONS CAREFULLY     Do not engage in strenuous activity (e.g., lifting or pushing heavy objects or repeated bending) for 24 hours.     Do not take a bath, swim or use Jacuzzi for 24 hours after procedure. (A shower is fine).   Remove any Band-Aids when you get home.    Use cold/ice, as needed for comfort.  We recommend the use of cold therapy alternating on for 20 minutes, off for 20 minutes.    Do not apply direct heat (heating pad or heat packs) to the injection site for 24 hours.     Resume your usual medications, unless instructed otherwise by your Pain Physician.     If you are on warfarin (Coumadin) or other blood thinner, resume this medication as instructed by your prescribing Physician.    IF AT ANY POINT YOU ARE VERY CONCERNED ABOUT YOUR SYMPTOMS, PLEASE GO TO THE EMERGENCY ROOM.    If you develop worsening pain, weakness, numbness, lose bowel or bladder control (i.e., having an accident where you did not even know you had to go to the bathroom and suddenly noticed you soiled yourself), saddle anesthesia (a loss of sensation restricted to the area of the buttocks, anus and between the legs -- i.e., those parts of your body that would touch a saddle if you were sitting on one) you need to go immediately to the emergency department for evaluation and treatment.    ----------------------------------------------------------------------------------------------------------------------------------------------------------------  If you received Sedation please read the following instructions:  POST SEDATION  INSTRUCTIONS    Today you received intravenous medication (also known as sedation) that was used to help you relax and/or decrease discomfort during your procedure. This medication will be acting in your body for the next 24 hours, so you might feel a little tired or sleepy. This feeling will slowly wear off.   Common side effects associated with these medications include: drowsiness, dizziness, sleepiness, confusion, feeling excited, difficulty remembering things, lack of steadiness with walking or balance, loss of fine muscle control, slowed reflexes, difficulty focusing, and blurred vision.  Some over-the-counter and prescription medications (e.g., muscle relaxants, opioids, mood-altering medications, sedatives/hypnotics, antihistamines) can interact with the intravenous medication you received and cause an increased risk of the side effects listed above in addition to other potentially life threatening side effects. Use extreme caution if you are taking such medications, and consult with your Pain Physician or prescribing physician if you have any questions.  For the next 12-24 hours:    DO NOT--Drive a car, operate machinery or power tools   DO NOT--Drink any alcoholic beverages (not even beer), they may dangerously increase the risk of side effects.    DO NOT--Make any important legal or business decisions or sign important documents.  We advise you to have someone to assist you at home. Move slowly and carefully. Do not make sudden changes in position. Be aware of dizziness or light-headedness and move accordingly.   If you seek medical treatment within 24 hours, let the nurse or doctor caring for you know that you have received the above medications. If you have any questions or concerns related to your sedation or treatment today please contact us.

## 2025-07-11 NOTE — H&P
HPI  Anayeli Judd presents for Procedure(s):  B/L SI Joint inj    Recent anticoagulation/antiplatelets reviewed and verified with nursing.  Recent antibiotics/recent infections reviewed and verified with nursing.    Case cancelled due to blood glucose > 240    Past Medical History:   Diagnosis Date    Abdominal pain     Allergic rhinitis     Arthritis     Blood platelet disorder     Blood platelet disorder     Blood transfusion     during delivery and     Bowel obstruction     Cervical radiculopathy     followed by dr cloud    Cirrhosis of liver     Colon cancer     transverse colon; resected; Stage IIA (pT3 pN0 MX)    Degenerative joint disease (DJD) of lumbar spine     Diabetes mellitus     Diarrhea     Falls 2020    Family history of breast cancer     Family history of colon cancer     Fatty liver     GERD (gastroesophageal reflux disease)     History of shingles     Hyperlipidemia     Hypomagnesemia     Hypothyroidism     Irritable bowel syndrome     Microscopic colitis     treated     Migraine     Myelodysplastic syndrome     Raynaud phenomenon     Raynaud's disease     Squamous cell carcinoma of skin     Type 2 diabetes mellitus     Usual hyperplasia of lactiferous duct      Past Surgical History:   Procedure Laterality Date    ADENOIDECTOMY      APPENDECTOMY      BACK SURGERY      BREAST BIOPSY  1970    BREAST CYST EXCISION  1970?    BREAST LUMPECTOMY  1970    CARPAL TUNNEL RELEASE      bilateral      SECTION      CHOLECYSTECTOMY  1965    COLECTOMY  2011    Transverse colon resection by Dr. Aguirre    COLONOSCOPY N/A 2017    Procedure: COLONOSCOPY;  Surgeon: Manjit Alvarez MD;  Location: [address]);  Service: Endoscopy;  Laterality: N/A;    COLONOSCOPY N/A 2019    Procedure: COLONOSCOPY;  Surgeon: Ramiro Jefferson MD;  Location: [address]);  Service: Endoscopy;  Laterality: N/A;  PM prep    COLONOSCOPY N/A 2022    Procedure:  COLONOSCOPY;  Surgeon: Ramiro Jefferson MD;  Location: [facility] (2ND FLR);  Service: Endoscopy;  Laterality: N/A;  EGD and colonoscopy in 6-8 weeks from now     states has constipation. -extended Golytely prep    CYSTOSCOPY N/A 11/09/2021    Procedure: CYSTOSCOPY;  Surgeon: Viridiana Valenzuela MD;  Location: [facility] OR 1ST FLR;  Service: Urology;  Laterality: N/A;    ENDOSCOPIC ULTRASOUND OF UPPER GASTROINTESTINAL TRACT N/A 12/12/2018    Procedure: ULTRASOUND, UPPER GI TRACT, ENDOSCOPIC;  Surgeon: Jose Hess MD;  Location: [facility];  Service: Endoscopy;  Laterality: N/A;    ENDOSCOPIC ULTRASOUND OF UPPER GASTROINTESTINAL TRACT N/A 01/23/2019    Procedure: ULTRASOUND, UPPER GI TRACT, ENDOSCOPIC;  Surgeon: Jose Hess MD;  Location: [facility] (2ND FLR);  Service: Endoscopy;  Laterality: N/A;    ENDOSCOPIC ULTRASOUND OF UPPER GASTROINTESTINAL TRACT N/A 10/25/2023    Procedure: ULTRASOUND, UPPER GI TRACT, ENDOSCOPIC;  Surgeon: Jose Campos MD;  Location: [facility];  Service: Endoscopy;  Laterality: N/A;  10/20/23: instructions sent via portal-GD    ESOPHAGOGASTRODUODENOSCOPY N/A 11/16/2018    Procedure: EGD (ESOPHAGOGASTRODUODENOSCOPY);  Surgeon: Angelo Reynolds MD;  Location: [facility] (2ND FLR);  Service: Endoscopy;  Laterality: N/A;    ESOPHAGOGASTRODUODENOSCOPY N/A 06/26/2019    Procedure: EGD (ESOPHAGOGASTRODUODENOSCOPY);  Surgeon: Ramiro Jefferson MD;  Location: [facility] (4TH FLR);  Service: Endoscopy;  Laterality: N/A;    ESOPHAGOGASTRODUODENOSCOPY N/A 05/04/2022    Procedure: EGD (ESOPHAGOGASTRODUODENOSCOPY);  Surgeon: Ramiro Jefferson MD;  Location: [facility] (2ND FLR);  Service: Endoscopy;  Laterality: N/A;  fully vaccinated-GT    ESOPHAGOGASTRODUODENOSCOPY N/A 10/25/2023    Procedure: EGD (ESOPHAGOGASTRODUODENOSCOPY);  Surgeon: Jose Campos MD;  Location: [facility];  Service: Endoscopy;  Laterality: N/A;    ESOPHAGOGASTRODUODENOSCOPY N/A 11/15/2023    Procedure:  ESOPHAGOGASTRODUODENOSCOPY (EGD);  Surgeon: Db Sanchez MD;  Location: [facility] ENDO (4TH FLR);  Service: Endoscopy;  Laterality: N/A;  New diagnosis of esophageal varices. Not a candidate for beta blocker due to soft blood pressures. Recommend EV banding.  PT/INR done on 11/3/23 and CBC done on 9/30/23  ok per Dr. Espinoza to be done by 1st available provider-see tele encounter-st  1    EYE SURGERY      Cataract Removal    FLUOROSCOPIC URODYNAMIC STUDY N/A 11/09/2021    Procedure: URODYNAMIC STUDY, FLUOROSCOPIC;  Surgeon: Viridiana Valenzuela MD;  Location: [facility] OR 1ST FLR;  Service: Urology;  Laterality: N/A;  90 minutes     HYSTERECTOMY      JOINT REPLACEMENT      OOPHORECTOMY  1970    posterolateral fusion with autograft bone and Eun mineralized bone matrix  02/01/2013    at [facility] for lumbar spine stenosis    SMALL INTESTINE SURGERY      SPINE SURGERY      TOE SURGERY      TONSILLECTOMY      TRANSFORAMINAL EPIDURAL INJECTION OF STEROID Bilateral 12/21/2022    Procedure: Injection,steroid,epidural, Todd L5/S1 TF CONTRAST DIRECT REFERRAL;  Surgeon: Toni Osorio MD;  Location: [facility] PAIN MGT;  Service: Pain Management;  Laterality: Bilateral;    TRIGGER FINGER RELEASE       Review of patient's allergies indicates:   Allergen Reactions    Codeine Itching and Nausea And Vomiting    Dilaudid [hydromorphone (bulk)] Other (See Comments)     Oversedating, head burning. Pt prefers to avoid.       Percocet [oxycodone-acetaminophen] Itching    Sulfa (sulfonamide antibiotics) Itching and Nausea And Vomiting    Dilaudid [hydromorphone]     Latex, natural rubber Rash    Opioids - morphine analogues Rash        PMHx, PSHx, Allergies, Medications reviewed in epic      ROS negative except pain complaints in HPI    OBJECTIVE:    LMP  (LMP Unknown)     PHYSICAL EXAMINATION:    GENERAL: Well appearing, in no acute distress, alert and oriented to name, situation, location.  PSYCH:  Mood and affect appropriate.  SKIN: Skin color,  texture, turgor normal, no rashes or lesions at site of procedure.  CV: regular rate with palpation of the radial artery.  PULM: No evidence of respiratory difficulty, symmetric chest rise. No audible abnormal respiratory sounds.  NEURO: Cranial nerves grossly intact.    Plan:    Case cancelled due to blood glucose > 240    Charly Mahmood  07/11/2025

## 2025-07-11 NOTE — PLAN OF CARE
Dr Mahmood spoke with the pt in regards to her elevated blood glucose. The pt will have the procedure rescheduled.

## 2025-07-12 DIAGNOSIS — K21.9 GASTROESOPHAGEAL REFLUX DISEASE, UNSPECIFIED WHETHER ESOPHAGITIS PRESENT: Chronic | ICD-10-CM

## 2025-07-12 NOTE — TELEPHONE ENCOUNTER
Refill Routing Note   Medication(s) are not appropriate for processing by [facility] for the following reason(s):        Outside of protocol    [facility] action(s):  Route             Appointments  past 12m or future 3m with PCP    Date Provider   Last Visit   5/20/2025 Darci Guthrie MD   Next Visit   Visit date not found Darci Guthrie MD   ED visits in past 90 days: 0        Note composed:11:27 PM 07/11/2025

## 2025-07-12 NOTE — TELEPHONE ENCOUNTER
No care due was identified.  [facility] Embedded Care Due Messages. Reference number: [id number].   7/11/2025 10:44:48 PM CDT   no

## 2025-07-12 NOTE — TELEPHONE ENCOUNTER
No care due was identified.  [facility] Embedded Care Due Messages. Reference number: [id number].   7/12/2025 8:10:00 AM CDT

## 2025-07-13 ENCOUNTER — PATIENT MESSAGE (OUTPATIENT)
Dept: INTERNAL MEDICINE | Facility: CLINIC | Age: 80
End: 2025-07-13
Payer: MEDICARE

## 2025-07-13 RX ORDER — LACTULOSE 10 G/15ML
SOLUTION ORAL; RECTAL
Qty: 300 ML | Refills: 5 | Status: SHIPPED | OUTPATIENT
Start: 2025-07-13

## 2025-07-14 ENCOUNTER — TELEPHONE (OUTPATIENT)
Dept: PAIN MEDICINE | Facility: CLINIC | Age: 80
End: 2025-07-14
Payer: MEDICARE

## 2025-07-14 DIAGNOSIS — M46.1 BILATERAL SACROILIITIS: Primary | ICD-10-CM

## 2025-07-14 RX ORDER — PANTOPRAZOLE SODIUM 40 MG/1
40 TABLET, DELAYED RELEASE ORAL 2 TIMES DAILY
Qty: 180 TABLET | Refills: 1 | Status: SHIPPED | OUTPATIENT
Start: 2025-07-14

## 2025-07-14 NOTE — TELEPHONE ENCOUNTER
Refill Routing Note   Medication(s) are not appropriate for processing by [facility] for the following reason(s):        Outside of protocol    [facility] action(s):  Route        Medication Therapy Plan: Total daily dose exceeds maintenance dosing for PPI      Appointments  past 12m or future 3m with PCP    Date Provider   Last Visit   5/20/2025 Darci Guthrie MD   Next Visit   Visit date not found Darci Guthrie MD   ED visits in past 90 days: 0        Note composed:7:47 PM 07/13/2025

## 2025-07-14 NOTE — PROGRESS NOTES
BG running higher recently. 300's currently. Pt had also not been monitoring prior to noted level in 200's with Pain Mgmt the other day. CSI had to be post-poned. Continuing Tradjenta. Restarting Metformin with XR 1000 qd. For now, agreed on having a shorter acting sulfonylurea on hand in case needed. Insulin would unfortunately be difficult for patient so will avoid. Denies any fever or concerning infection symptoms such as UTI.

## 2025-07-18 ENCOUNTER — TELEPHONE (OUTPATIENT)
Dept: PAIN MEDICINE | Facility: CLINIC | Age: 80
End: 2025-07-18
Payer: MEDICARE

## 2025-07-21 ENCOUNTER — OFFICE VISIT (OUTPATIENT)
Dept: OTOLARYNGOLOGY | Facility: CLINIC | Age: 80
End: 2025-07-21
Payer: MEDICARE

## 2025-07-21 VITALS — DIASTOLIC BLOOD PRESSURE: 73 MMHG | HEART RATE: 64 BPM | SYSTOLIC BLOOD PRESSURE: 115 MMHG

## 2025-07-21 DIAGNOSIS — J34.829 NASAL VALVE COLLAPSE: ICD-10-CM

## 2025-07-21 DIAGNOSIS — Z97.4 HEARING AID WORN: ICD-10-CM

## 2025-07-21 DIAGNOSIS — R04.0 EPISTAXIS, RECURRENT: Primary | ICD-10-CM

## 2025-07-21 DIAGNOSIS — H91.90 HEARING LOSS, UNSPECIFIED HEARING LOSS TYPE, UNSPECIFIED LATERALITY: ICD-10-CM

## 2025-07-21 DIAGNOSIS — Z98.890 HISTORY OF SINUS SURGERY: ICD-10-CM

## 2025-07-21 NOTE — PROGRESS NOTES
Ear, Nose, & Throat  Otolaryngology - Head & Neck Surgery      Subjective:     Chief Complaint: No chief complaint on file.      Anayeli Judd is a 80 y.o. female who was referred to me by Dr. Darci Guthrie in consultation for aural fullness and epistaxis.    She has had lifelong issues with chronic sinusitis, right sided epistaxis, and difficulty breathing through the nose. She had sinus surgery with Dr. Rios many years ago from which she had multiple years of relief. Had a second nasal surgery but is unsure of what kind or who the surgeon was but took place in clinic. Has a history of recurrent right sided epistaxis which has been cauterized in the past. After nosebleeds, crust forms and causes difficulty breathing through the nose on the right side. She uses Flonase and Astelin not daily but frequently.     It has been a few years since she last head her hearing aids serviced. Her children have reported to her that they are repeating themselves often. No sudden changes in hearing or tinnitus.      Past Medical History  Active Ambulatory Problems     Diagnosis Date Noted    Lumbar spondylosis 07/24/2012    Cervical spondylosis with radiculopathy 07/24/2012    Malignant neoplasm of colon, unspecified part of colon     Insomnia 07/25/2012    Carpal tunnel syndrome 08/21/2012    Headache(784.0) 10/08/2012    Acquired hypothyroidism 01/11/2013    History of colon cancer 04/05/2013    Irritable bowel syndrome with both constipation and diarrhea 04/05/2013    Chronic sinusitis of right maxilla 04/05/2013    Diarrhea 04/08/2013    Pelvic muscle wasting 09/13/2013    GERD (gastroesophageal reflux disease)     Fatty liver     Partial small bowel obstruction 09/01/2014    Family history of breast cancer 03/08/2015    Gait instability 03/24/2015    Type 2 diabetes mellitus without complication, without long-term current use of insulin 05/05/2015    Acute deep vein thrombosis (DVT) of upper extremity 04/15/2017     Wound infection after surgery 04/15/2017    Chronic diarrhea 07/27/2017    Fecal incontinence 08/14/2017    Generalized abdominal pain 04/17/2018    Vitamin B12 deficiency 06/26/2018    Vaginal atrophy 06/30/2018    Incomplete emptying of bladder 06/30/2018    Mixed stress and urge urinary incontinence 06/30/2018    Vaginal irritation 06/30/2018    Fecal soiling 06/30/2018    Irregular bowel habits 06/30/2018    Elevated serum creatinine 06/30/2018    Hypomagnesemia 11/15/2018    Hyponatremia 11/15/2018    Prolapse of female genital organs 05/22/2019    Rectocele 05/22/2019    Iron deficiency anemia due to chronic blood loss 02/28/2020    Postmenopausal bleeding 02/28/2020    SBO (small bowel obstruction) 03/11/2020    Thrombocytopenia 08/03/2020    Myelodysplastic syndrome 03/16/2021    Small bowel obstruction due to adhesions 05/14/2021    Constipation     Bilious vomiting with nausea     Parkinsonism 08/10/2021    Memory change 08/10/2021    Other chest pain 10/08/2021    Atrophic pancreas 10/15/2021    Other hyperlipidemia 11/10/2021    Type 2 diabetes mellitus with diabetic peripheral angiopathy without gangrene, without long-term current use of insulin 01/05/2022    Abdominal discomfort 01/22/2022    Anxiety 02/10/2022    Abnormal liver enzymes 02/11/2022    Entrapment of intestine in abdominal adhesions 03/04/2022    Chronic low back pain 06/09/2022    Weakness 07/24/2022    Periumbilical abdominal pain 11/27/2022    Pulmonary emphysema, unspecified emphysema type 10/17/2023    Portal hypertension 11/07/2023    MetALD 04/17/2024    Compensated liver disease 04/17/2024    Secondary esophageal varices without bleeding 04/17/2024    Hepatic encephalopathy 10/09/2024    Elevated liver enzymes 03/31/2025    Body mass index (BMI) 23.0-23.9, adult 03/31/2025    Hypoalbuminemia 03/31/2025     Resolved Ambulatory Problems     Diagnosis Date Noted    Colitis 06/28/2013    Small bowel obstruction 08/31/2014     Hypotension 2014    Acute encephalopathy 2014    Severe sepsis 2014    Oliguria 2014    Septic shock 2014    Metabolic acidosis 2014    Respiratory alkalosis 2014    Numbness of face 2015    SBO (small bowel obstruction) 2017    Sequelae of hyperalimentation 2017    Diabetes mellitus due to underlying condition with diabetic nephropathy, without long-term current use of insulin 04/15/2017    Hypomagnesemia 04/15/2017    GI bleed 2017    Hypokalemia 2017    Hypophosphatemia 2017    Hyponatremia 2017    Hematoma 2017    Nausea 2018    Dysuria 2018    Chronic kidney disease, stage III (moderate) 11/15/2018    Anemia 11/15/2018    Hydronephrosis 11/15/2018    Superior mesenteric artery stenosis 11/15/2018    Acute cystitis without hematuria 2018    Small bowel obstruction 2021    UTI (urinary tract infection) 02/10/2022     Past Medical History:   Diagnosis Date    Abdominal pain     Allergic rhinitis     Arthritis     Blood platelet disorder     Blood platelet disorder     Blood transfusion     Bowel obstruction     Cervical radiculopathy     Cirrhosis of liver     Colon cancer     Degenerative joint disease (DJD) of lumbar spine     Diabetes mellitus     Falls 2020    Family history of colon cancer     History of shingles     Hyperlipidemia     Hypothyroidism     Irritable bowel syndrome     Microscopic colitis     Migraine     Raynaud phenomenon     Raynaud's disease     Squamous cell carcinoma of skin     Type 2 diabetes mellitus     Usual hyperplasia of lactiferous duct 1970       Past Surgical History  She has a past surgical history that includes Hysterectomy; Appendectomy; Toe Surgery; Tonsillectomy;  section; posterolateral fusion with autograft bone and [place] mineralized bone matrix (2013); Carpal tunnel release; Trigger finger release; Back surgery; Cholecystectomy ();  Colectomy (2011); Colonoscopy (N/A, 2017); Eye surgery; Esophagogastroduodenoscopy (N/A, 2018); Endoscopic ultrasound of upper gastrointestinal tract (N/A, 2018); Endoscopic ultrasound of upper gastrointestinal tract (N/A, 2019); Esophagogastroduodenoscopy (N/A, 2019); Colonoscopy (N/A, 2019); Fluoroscopic urodynamic study (N/A, 2021); Cystoscopy (N/A, 2021); Adenoidectomy; Small intestine surgery; Spine surgery; Joint replacement; Esophagogastroduodenoscopy (N/A, 2022); Colonoscopy (N/A, 2022); Transforaminal epidural injection of steroid (Bilateral, 2022); Breast biopsy (); Breast lumpectomy (); Breast cyst excision (?); Oophorectomy (); Endoscopic ultrasound of upper gastrointestinal tract (N/A, 10/25/2023); Esophagogastroduodenoscopy (N/A, 10/25/2023); and Esophagogastroduodenoscopy (N/A, 11/15/2023).    Past Surgical History:   Procedure Laterality Date    ADENOIDECTOMY      APPENDECTOMY      BACK SURGERY      BREAST BIOPSY  1970    BREAST CYST EXCISION  1970?    BREAST LUMPECTOMY  1970    CARPAL TUNNEL RELEASE      bilateral      SECTION      CHOLECYSTECTOMY  1965    COLECTOMY  2011    Transverse colon resection by Dr. Aguirre    COLONOSCOPY N/A 2017    Procedure: COLONOSCOPY;  Surgeon: Manjit Alvarez MD;  Location: [facility] (4TH FLR);  Service: Endoscopy;  Laterality: N/A;    COLONOSCOPY N/A 2019    Procedure: COLONOSCOPY;  Surgeon: Ramiro Jefferson MD;  Location: [facility] (4TH FLR);  Service: Endoscopy;  Laterality: N/A;  PM prep    COLONOSCOPY N/A 2022    Procedure: COLONOSCOPY;  Surgeon: Ramiro Jefferson MD;  Location: [facility] (2ND FLR);  Service: Endoscopy;  Laterality: N/A;  EGD and colonoscopy in 6-8 weeks from now     states has constipation. -extended Golytely prep    CYSTOSCOPY N/A 2021    Procedure: CYSTOSCOPY;  Surgeon: Viridiana Valenzuela MD;  Location: [facility] OR [address] [place] [address];   Service: Urology;  Laterality: N/A;    ENDOSCOPIC ULTRASOUND OF UPPER GASTROINTESTINAL TRACT N/A 12/12/2018    Procedure: ULTRASOUND, UPPER GI TRACT, ENDOSCOPIC;  Surgeon: Jose Hess MD;  Location: [facility];  Service: Endoscopy;  Laterality: N/A;    ENDOSCOPIC ULTRASOUND OF UPPER GASTROINTESTINAL TRACT N/A 01/23/2019    Procedure: ULTRASOUND, UPPER GI TRACT, ENDOSCOPIC;  Surgeon: Jose Hess MD;  Location: [facility] (2ND FLR);  Service: Endoscopy;  Laterality: N/A;    ENDOSCOPIC ULTRASOUND OF UPPER GASTROINTESTINAL TRACT N/A 10/25/2023    Procedure: ULTRASOUND, UPPER GI TRACT, ENDOSCOPIC;  Surgeon: Jose Campos MD;  Location: [facility];  Service: Endoscopy;  Laterality: N/A;  10/20/23: instructions sent via portal-GD    ESOPHAGOGASTRODUODENOSCOPY N/A 11/16/2018    Procedure: EGD (ESOPHAGOGASTRODUODENOSCOPY);  Surgeon: Angelo Reynolds MD;  Location: [facility] (2ND FLR);  Service: Endoscopy;  Laterality: N/A;    ESOPHAGOGASTRODUODENOSCOPY N/A 06/26/2019    Procedure: EGD (ESOPHAGOGASTRODUODENOSCOPY);  Surgeon: Ramiro Jefferson MD;  Location: [facility] (4TH FLR);  Service: Endoscopy;  Laterality: N/A;    ESOPHAGOGASTRODUODENOSCOPY N/A 05/04/2022    Procedure: EGD (ESOPHAGOGASTRODUODENOSCOPY);  Surgeon: Ramiro Jefferson MD;  Location: [facility] (2ND FLR);  Service: Endoscopy;  Laterality: N/A;  fully vaccinated-GT    ESOPHAGOGASTRODUODENOSCOPY N/A 10/25/2023    Procedure: EGD (ESOPHAGOGASTRODUODENOSCOPY);  Surgeon: Jose Campos MD;  Location: [facility];  Service: Endoscopy;  Laterality: N/A;    ESOPHAGOGASTRODUODENOSCOPY N/A 11/15/2023    Procedure: ESOPHAGOGASTRODUODENOSCOPY (EGD);  Surgeon: Db Sanchez MD;  Location: [facility] (4TH FLR);  Service: Endoscopy;  Laterality: N/A;  New diagnosis of esophageal varices. Not a candidate for beta blocker due to soft blood pressures. Recommend EV banding.  PT/INR done on 11/3/23 and CBC done on 9/30/23  ok per Dr. Espinoza to be done by 1st  available provider-see tele encounter-st  1    EYE SURGERY      Cataract Removal    FLUOROSCOPIC URODYNAMIC STUDY N/A 11/09/2021    Procedure: URODYNAMIC STUDY, FLUOROSCOPIC;  Surgeon: Viridiana Valenzuela MD;  Location: [address];  Service: Urology;  Laterality: N/A;  90 minutes     HYSTERECTOMY      JOINT REPLACEMENT      OOPHORECTOMY  1970    posterolateral fusion with autograft bone and [place] mineralized bone matrix  02/01/2013    at [facility] for lumbar spine stenosis    SMALL INTESTINE SURGERY      SPINE SURGERY      TOE SURGERY      TONSILLECTOMY      TRANSFORAMINAL EPIDURAL INJECTION OF STEROID Bilateral 12/21/2022    Procedure: Injection,steroid,epidural, Todd L5/S1 TF CONTRAST DIRECT REFERRAL;  Surgeon: Toni Osorio MD;  Location: [facility];  Service: Pain Management;  Laterality: Bilateral;    TRIGGER FINGER RELEASE          Family History  Her family history includes Alcohol abuse in her brother, brother, and brother; Arthritis in her brother, brother, and daughter; Asthma in her daughter and daughter; Bladder Cancer in her mother; Breast cancer (age of onset: 79) in her sister; Cataracts in her mother; Colon cancer in her father; Colon cancer (age of onset: 70) in her brother; Depression in her daughter, daughter, and daughter; Glaucoma in her mother; Heart disease in her mother; Heart disease (age of onset: 50) in her father; Hyperlipidemia in her mother; Hypertension in her daughter; Kidney disease in her mother; Parkinsonism in her brother; Stroke in her daughter; Stroke (age of onset: 40) in her daughter.    Social History  She reports that she has never smoked. She has never used smokeless tobacco. She reports that she does not currently use alcohol. She reports that she does not use drugs.    Allergies  She is allergic to codeine; dilaudid [hydromorphone (bulk)]; percocet [oxycodone-acetaminophen]; sulfa (sulfonamide antibiotics); dilaudid [hydromorphone]; latex, natural rubber; and opioids -  morphine analogues.    Medications  She has a current medication list which includes the following prescription(s): acetaminophen, ascorbic acid (vitamin c), azelastine, biotin, blood sugar diagnostic, calcium-vitamin d3, cranberry extract, diclofenac sodium, donepezil, duloxetine, freestyle dev 14 day reader, freestyle dev 14 day sensor, gabapentin, glipizide, hydrocortisone, lactulose, lancets, levothyroxine, lidocaine, tradjenta, magnesium, magnesium citrate, metformin, mometasone, naproxen, ospemifene, pantoprazole, premarin, rifaximin, trazodone, triamcinolone acetonide 0.1%, ubrelvy, valacyclovir, blood-glucose meter, and [DISCONTINUED] hyoscyamine.    ROS:  Pertinent positive and negative review of systems as noted in HPI.     Objective:     /73 (BP Location: Left arm, Patient Position: Sitting)   Pulse 64   LMP  (LMP Unknown)      Constitutional:   She appears well-developed and well-nourished.   Ambulates with a cane.      Ears:    Right Ear: No drainage, swelling or tenderness. Tympanic membrane is scarred. No middle ear effusion.   Left Ear: No drainage, swelling or tenderness. Tympanic membrane is scarred.  No middle ear effusion.   Bilateral cerumen impactions. Wears hearing aids.     Nose:  No rhinorrhea or nasal septal hematoma. Epistaxis is observed.   Bloody crust over right inferior turbinate. No purulent drainage in sinuses on nasal endoscopy.     Mouth/Throat  Oropharynx clear and moist without lesions or asymmetry and normal uvula midline.     Pulmonary/Chest:   Effort normal.       Data Review:   LABS    IMAGING    No pertinent imaging available    AUDIO    not performed    Procedures:     Procedure: Cerumen removal [id number]     Pre procedure Diagnosis: bilateral Cerumen Impaction     Post procedure Diagnosis: same     Instrument: Binocular Microscope     Anesthesia: None     Procedure: After verbal consent was obtained, the patient was laid supine in the small procedure room. A  microscope was used to examine the ear. Instrumentation and suction were used to remove any cerumen from the EAC. If indicated, the procedure was repeated on the contralateral side. The patient tolerated the procedure well and without any acute complication. Findings below.      Findings:               Right Ear:               EAC: Normal, Cerumen filled              Tympanic membrane: Intact              Middle Ear: No effusion present and [place] in normal position  Left Ear:               EAC: Normal Cerumen filled              Tympanic membrane: Intact              Middle Ear: No effusion present and [place] in normal position     The cerumen was removed completely from both ears.      RTC prn     Assessment:     1. Epistaxis, recurrent    2. Nasal valve collapse    3. History of sinus surgery    4. Hearing loss, unspecified hearing loss type, unspecified laterality    5. Hearing aid worn        Plan:     I had a long discussion with the patient regarding her condition and the further workup and management options.    I recommend she stop using Flonase and Astelin and begin using nasal saline spray. I also recommend a trial of magnetic nasal dilator for nasal valve collapse. Sinuses clear on nasal endoscopy. Cerumen removal performed in clinic today. I recommend she see her audiologist for updated audiogram and hearing aid adjustment as it has been a few years since last adjustment. Prior audiogram with symmetrical high frequency SNHL.     Follow up with Max WATERMAN in 6 weeks. Will re-evaluate epistaxis at that time.     No orders of the defined types were placed in this encounter.         Problem List Items Addressed This Visit    None  Visit Diagnoses         Epistaxis, recurrent    -  Primary      Nasal valve collapse          History of sinus surgery          Hearing loss, unspecified hearing loss type, unspecified laterality          Hearing aid worn

## 2025-07-23 ENCOUNTER — TELEPHONE (OUTPATIENT)
Dept: PAIN MEDICINE | Facility: CLINIC | Age: 80
End: 2025-07-23
Payer: MEDICARE

## 2025-07-25 ENCOUNTER — HOSPITAL ENCOUNTER (OUTPATIENT)
Facility: HOSPITAL | Age: 80
Discharge: HOME | End: 2025-07-25
Attending: STUDENT IN AN ORGANIZED HEALTH CARE EDUCATION/TRAINING PROGRAM | Admitting: STUDENT IN AN ORGANIZED HEALTH CARE EDUCATION/TRAINING PROGRAM
Payer: MEDICARE

## 2025-07-25 ENCOUNTER — PATIENT MESSAGE (OUTPATIENT)
Dept: INTERNAL MEDICINE | Facility: CLINIC | Age: 80
End: 2025-07-25
Payer: MEDICARE

## 2025-07-25 VITALS
HEIGHT: 62 IN | DIASTOLIC BLOOD PRESSURE: 67 MMHG | OXYGEN SATURATION: 97 % | TEMPERATURE: 98 F | BODY MASS INDEX: 24.29 KG/M2 | RESPIRATION RATE: 14 BRPM | SYSTOLIC BLOOD PRESSURE: 145 MMHG | WEIGHT: 132 LBS | HEART RATE: 63 BPM

## 2025-07-25 DIAGNOSIS — F03.90 DEMENTIA, UNSPECIFIED DEMENTIA SEVERITY, UNSPECIFIED DEMENTIA TYPE, UNSPECIFIED WHETHER BEHAVIORAL, PSYCHOTIC, OR MOOD DISTURBANCE OR ANXIETY: Primary | ICD-10-CM

## 2025-07-25 DIAGNOSIS — M46.1 BILATERAL SACROILIITIS: Primary | ICD-10-CM

## 2025-07-25 LAB — POCT GLUCOSE: 164 MG/DL (ref 70–110)

## 2025-07-25 PROCEDURE — 63600175 PHARM REV CODE 636 W HCPCS: Performed by: STUDENT IN AN ORGANIZED HEALTH CARE EDUCATION/TRAINING PROGRAM

## 2025-07-25 PROCEDURE — 27096 INJECT SACROILIAC JOINT: CPT | Mod: 50,,, | Performed by: STUDENT IN AN ORGANIZED HEALTH CARE EDUCATION/TRAINING PROGRAM

## 2025-07-25 PROCEDURE — 82962 GLUCOSE BLOOD TEST: CPT | Performed by: STUDENT IN AN ORGANIZED HEALTH CARE EDUCATION/TRAINING PROGRAM

## 2025-07-25 PROCEDURE — 25500020 PHARM REV CODE 255: Performed by: STUDENT IN AN ORGANIZED HEALTH CARE EDUCATION/TRAINING PROGRAM

## 2025-07-25 PROCEDURE — 27096 INJECT SACROILIAC JOINT: CPT | Performed by: STUDENT IN AN ORGANIZED HEALTH CARE EDUCATION/TRAINING PROGRAM

## 2025-07-25 PROCEDURE — 99152 MOD SED SAME PHYS/QHP 5/>YRS: CPT | Performed by: STUDENT IN AN ORGANIZED HEALTH CARE EDUCATION/TRAINING PROGRAM

## 2025-07-25 PROCEDURE — 99152 MOD SED SAME PHYS/QHP 5/>YRS: CPT | Mod: ,,, | Performed by: STUDENT IN AN ORGANIZED HEALTH CARE EDUCATION/TRAINING PROGRAM

## 2025-07-25 RX ORDER — SODIUM CHLORIDE 9 MG/ML
500 INJECTION, SOLUTION INTRAVENOUS CONTINUOUS
Status: DISCONTINUED | OUTPATIENT
Start: 2025-07-25 | End: 2025-07-25 | Stop reason: HOSPADM

## 2025-07-25 RX ORDER — LIDOCAINE HYDROCHLORIDE 10 MG/ML
1 INJECTION, SOLUTION EPIDURAL; INFILTRATION; INTRACAUDAL; PERINEURAL ONCE
Status: DISCONTINUED | OUTPATIENT
Start: 2025-07-25 | End: 2025-07-25 | Stop reason: HOSPADM

## 2025-07-25 RX ORDER — LIDOCAINE HYDROCHLORIDE 20 MG/ML
INJECTION, SOLUTION EPIDURAL; INFILTRATION; INTRACAUDAL; PERINEURAL
Status: DISCONTINUED | OUTPATIENT
Start: 2025-07-25 | End: 2025-07-25 | Stop reason: HOSPADM

## 2025-07-25 RX ORDER — TRIAMCINOLONE ACETONIDE 40 MG/ML
INJECTION, SUSPENSION INTRA-ARTICULAR; INTRAMUSCULAR
Status: DISCONTINUED | OUTPATIENT
Start: 2025-07-25 | End: 2025-07-25 | Stop reason: HOSPADM

## 2025-07-25 RX ORDER — MIDAZOLAM HYDROCHLORIDE 1 MG/ML
INJECTION INTRAMUSCULAR; INTRAVENOUS
Status: DISCONTINUED | OUTPATIENT
Start: 2025-07-25 | End: 2025-07-25 | Stop reason: HOSPADM

## 2025-07-25 RX ORDER — BUPIVACAINE HYDROCHLORIDE 2.5 MG/ML
INJECTION, SOLUTION EPIDURAL; INFILTRATION; INTRACAUDAL; PERINEURAL
Status: DISCONTINUED | OUTPATIENT
Start: 2025-07-25 | End: 2025-07-25 | Stop reason: HOSPADM

## 2025-07-25 RX ORDER — DONEPEZIL HYDROCHLORIDE 5 MG/1
5 TABLET, FILM COATED ORAL NIGHTLY
Qty: 90 TABLET | Refills: 3 | Status: SHIPPED | OUTPATIENT
Start: 2025-07-25 | End: 2026-07-25

## 2025-07-25 RX ORDER — FENTANYL CITRATE 50 UG/ML
INJECTION, SOLUTION INTRAMUSCULAR; INTRAVENOUS
Status: DISCONTINUED | OUTPATIENT
Start: 2025-07-25 | End: 2025-07-25 | Stop reason: HOSPADM

## 2025-07-25 NOTE — INTERVAL H&P NOTE
The patient was examined and no significant changes were noted from the updated H&P or last clinic note.    The risks and benefits of this procedure, including alternative therapies, were discussed with the patient.  The discussion of risks included infection, bleeding, need for additional procedures or surgery, nerve damage, paralysis, adverse medication reaction(s), stroke, and if appropriate for the procedure, death.  Questions regarding the procedure, risks, expected outcome, and possible side effects were solicited and answered to Anayeli's satisfaction.  Anayeli Judd wishes to proceed with the injection or procedure as confirmed by written consent.    B, ok to proceed      
No

## 2025-07-25 NOTE — OP NOTE
Sacroiliac Joint Injection under Fluoroscopic Guidance    The procedure, risks, benefits, and options were discussed with the patient. There are no contraindications to the procedure. The patent expressed understanding and agreed to the procedure. Informed written consent was obtained prior to the start of the procedure and can be found in the patient's chart.    PATIENT NAME: Anayeli Judd   MRN: [id number]     DATE OF PROCEDURE: 07/25/2025    PROCEDURE: Bilateral Sacroiliac Joint Injection under Fluoroscopic Guidance    PRE-OP DIAGNOSIS: Bilateral sacroiliitis [M46.1]    POST-OP DIAGNOSIS: Same    PHYSICIAN: Charly Mahmood MD    ASSISTANTS: Yovani Jones MD     MEDICATIONS INJECTED: Preservative-free Kenalog 40mg with 3cc of Bupivacine 0.25%     LOCAL ANESTHETIC INJECTED: Xylocaine 2%     SEDATION: Versed 1mg and Fentanyl 50mcg                                                                                                                                                                                     Conscious sedation ordered by M.D. Patient re-evaluation prior to administration of conscious sedation. No changes noted in patient's status from initial evaluation. The patient's vital signs were monitored by RN and patient remained hemodynamically stable throughout the procedure.    Event Time In   Sedation Start 0936   Sedation End 0948       ESTIMATED BLOOD LOSS: None    COMPLICATIONS: None    TECHNIQUE: Time-out was performed to identify the patient and procedure to be performed. With the patient laying in a prone position, the surgical area was prepped and draped in the usual sterile fashion using ChloraPrep and a fenestrated drape. The sacroiliac joint was determined under fluoroscopy guidance. Skin anesthesia was achieved by injecting Lidocaine 2% over the injection site. The sacroiliac joint was  then approached with a 22 gauge, 3.5 inch spinal quinke needle that was introduced under fluoroscopic guidance  in the AP and Lateral views. Once the needle tip was in the area of the joint, and there was no blood, contrast dye Omnipaque (300mg/mL) was injected to confirm placement and there was no vascular runoff. Fluoroscopic imaging in the AP and lateral views revealed a clear outline of the joint space. 4 mL of the medication mixture listed above was injected slowly intraarticular and sam-articular. Displacement of the radio opaque contrast after injection of the medication confirmed that the medication went into the area of the joint. The needles were removed and bleeding was nil. The same procedure was repeated on the contralateral side. A sterile dressing was applied. No specimens collected. The patient tolerated the procedure well.     The patient was monitored after the procedure in the recovery area. They were given post-procedure and discharge instructions to follow at home. The patient was discharged in a stable condition.    Charly Mahmood MD

## 2025-07-25 NOTE — DISCHARGE INSTRUCTIONS
[facility]/[place]  Dr. Charly Mahmood  Messaging service # [phone]    POST-PROCEDURE INSTRUCTIONS:  HELPFUL TIPS:    Except for block procedures (medial branch blocks, genicular blocks, hip/shoulder blocks), most procedures take about 2 weeks to start seeing the full effect toward your pain.  If you feel like the pain relief goes away after a day, please wait up to 2 weeks to decide if the procedure provided you any relief    If you had a block procedure (medial branch blocks, genicular blocks, hip/shoulder blocks), you MUST submit your pain diary and percentage relief scores to proceed with the next block and/or procedure.  Please submit the diary/percentages through the [facility] OR you can call [phone] ([facility] digital) OR [phone] ([facility]) during clinic hours (Monday - Friday, 8AM - 5PM)    Follow up in 2 weeks with either the provider that suggested this procedure OR with Dr. Mahmood (including his team members at [facility]).  You may already have a follow up appointment scheduled.  If not, you may make an appointment through the [facility] or you can contact the office that suggested your procedure.  If you would like to make an appointment with Dr. Mahmood, you may do so through the [facility] OR you can call [phone] ([facility] digital) OR [phone] ([facility]) during clinic hours (Monday - Friday, 8AM - 5PM).      What you need to do:    Keep a record of your response to the injection you had today.    How much relief did you get?   When did the relief start and how long did it last?  Were you able to decrease the use of any of your pain medications?  Were you able to increase your level of activity?  How long did the relief last?    What to watch out for:    If you experience any of the following symptoms after your procedure, please notify the messaging service immediately (see above for contact information):   fever (increased oral temperature)   bleeding or  swelling at the injection site,    drainage, rash or redness at the injection site    possible signs of infection    increased pain at the injection site   worsening of your usual pain   severe headache   new or worsening numbness    new arm and/or leg weakness, or    changes in bowel and/or bladder function: urinating or defecating on yourself and not knowing that you did it.    PLEASE FOLLOW ALL INSTRUCTIONS CAREFULLY     Do not engage in strenuous activity (e.g., lifting or pushing heavy objects or repeated bending) for 24 hours.     Do not take a bath, swim or use Jacuzzi for 24 hours after procedure. (A shower is fine).   Remove any Band-Aids when you get home.    Use cold/ice, as needed for comfort.  We recommend the use of cold therapy alternating on for 20 minutes, off for 20 minutes.    Do not apply direct heat (heating pad or heat packs) to the injection site for 24 hours.     Resume your usual medications, unless instructed otherwise by your Pain Physician.     If you are on warfarin (Coumadin) or other blood thinner, resume this medication as instructed by your prescribing Physician.    IF AT ANY POINT YOU ARE VERY CONCERNED ABOUT YOUR SYMPTOMS, PLEASE GO TO THE EMERGENCY ROOM.    If you develop worsening pain, weakness, numbness, lose bowel or bladder control (i.e., having an accident where you did not even know you had to go to the bathroom and suddenly noticed you soiled yourself), saddle anesthesia (a loss of sensation restricted to the area of the buttocks, anus and between the legs -- i.e., those parts of your body that would touch a saddle if you were sitting on one) you need to go immediately to the emergency department for evaluation and treatment.    ----------------------------------------------------------------------------------------------------------------------------------------------------------------  If you received Sedation please read the following instructions:  POST SEDATION  INSTRUCTIONS    Today you received intravenous medication (also known as sedation) that was used to help you relax and/or decrease discomfort during your procedure. This medication will be acting in your body for the next 24 hours, so you might feel a little tired or sleepy. This feeling will slowly wear off.   Common side effects associated with these medications include: drowsiness, dizziness, sleepiness, confusion, feeling excited, difficulty remembering things, lack of steadiness with walking or balance, loss of fine muscle control, slowed reflexes, difficulty focusing, and blurred vision.  Some over-the-counter and prescription medications (e.g., muscle relaxants, opioids, mood-altering medications, sedatives/hypnotics, antihistamines) can interact with the intravenous medication you received and cause an increased risk of the side effects listed above in addition to other potentially life threatening side effects. Use extreme caution if you are taking such medications, and consult with your Pain Physician or prescribing physician if you have any questions.  For the next 12-24 hours:    DO NOT--Drive a car, operate machinery or power tools   DO NOT--Drink any alcoholic beverages (not even beer), they may dangerously increase the risk of side effects.    DO NOT--Make any important legal or business decisions or sign important documents.  We advise you to have someone to assist you at home. Move slowly and carefully. Do not make sudden changes in position. Be aware of dizziness or light-headedness and move accordingly.   If you seek medical treatment within 24 hours, let the nurse or doctor caring for you know that you have received the above medications. If you have any questions or concerns related to your sedation or treatment today please contact us.

## 2025-07-25 NOTE — DISCHARGE SUMMARY
Discharge Note  Short Stay      SUMMARY     Admit Date: 7/25/2025    Attending Physician: Charly Mahmood MD PhD    Discharge Physician: Charly Mahmood      Discharge Date: 7/25/2025 9:48 AM    Procedure(s) (LRB):  B/L SIJ (Bilateral)    Final Diagnosis: Bilateral sacroiliitis [M46.1]    Disposition: Home or self care    Patient Instructions:   Current Discharge Medication List        CONTINUE these medications which have NOT CHANGED    Details   DULoxetine (CYMBALTA) 30 MG capsule Take 1 capsule (30 mg total) by mouth 2 (two) times daily.  Qty: 180 capsule, Refills: 3    Comments: Please send a replace/new response with 90-Day Supply if appropriate to maximize member benefit. Requesting 1 year supply.      gabapentin (NEURONTIN) 300 MG capsule Take 1 to 2 capsules 3 times daily if needed for leg pain.  Qty: 180 capsule, Refills: 5      glipiZIDE (GLUCOTROL) 5 MG tablet Take 1 tablet (5 mg total) by mouth daily with breakfast.  Qty: 90 tablet, Refills: 0      lactulose (CHRONULAC) 10 gram/15 mL solution TAKE 30 ML BY MOUTH THREE TIMES DAILY AS NEEDED FOR CONSTIPATION  Qty: 300 mL, Refills: 5    Associated Diagnoses: Constipation, unspecified constipation type      levothyroxine (SYNTHROID) 75 MCG tablet TAKE 1 TABLET BY MOUTH BEFORE  BREAKFAST  Qty: 90 tablet, Refills: 3    Comments: Please send a replace/new response with 90-Day Supply if appropriate to maximize member benefit. Requesting 1 year supply.  Associated Diagnoses: Hypothyroidism, unspecified type      linaGLIPtin (TRADJENTA) 5 mg Tab tablet TAKE 1 TABLET(5 MG) BY MOUTH EVERY DAY  Qty: 90 tablet, Refills: 3    Comments: ZERO refills remain on this prescription. Your patient is requesting advance approval of refills for this medication to PREVENT ANY MISSED DOSES  Associated Diagnoses: Type 2 diabetes mellitus with other specified complication, without long-term current use of insulin      metFORMIN (GLUMETZA) 1000 MG (MOD) 24hr tablet Take 1 tablet (1,000 mg  total) by mouth daily with breakfast.  Qty: 90 tablet, Refills: 3    Associated Diagnoses: Type 2 diabetes mellitus with diabetic peripheral angiopathy without gangrene, without long-term current use of insulin      pantoprazole (PROTONIX) 40 MG tablet TAKE 1 TABLET(40 MG) BY MOUTH TWICE DAILY  Qty: 180 tablet, Refills: 1    Comments: ZERO refills remain on this prescription. Your patient is requesting advance approval of refills for this medication to PREVENT ANY MISSED DOSES  Associated Diagnoses: Gastroesophageal reflux disease, unspecified whether esophagitis present      rifAXIMin (XIFAXAN) 550 mg Tab Take 1 tablet (550 mg total) by mouth 2 (two) times daily.  Qty: 60 tablet, Refills: 11    Associated Diagnoses: Portal hypertension; Secondary esophageal varices without bleeding      traZODone (DESYREL) 50 MG tablet TAKE 1 TABLET(50 MG) BY MOUTH EVERY EVENING  Qty: 90 tablet, Refills: 3    Associated Diagnoses: Insomnia, unspecified type      acetaminophen (TYLENOL) 650 MG TbSR Take 1,300 mg by mouth 2 (two) times a day.      ascorbic acid, vitamin C, (VITAMIN C) 1000 MG tablet Take 1,000 mg by mouth once daily.      azelastine (ASTELIN) 137 mcg (0.1 %) nasal spray 1 spray (137 mcg total) by Nasal route 2 (two) times daily.  Qty: 30 mL, Refills: 11    Associated Diagnoses: Rhinitis, unspecified type      biotin 10,000 mcg TbDL Take 1 tablet by mouth once daily.       blood sugar diagnostic (FREESTYLE LITE STRIPS) Strp 1 strip by Misc.(Non-Drug; Combo Route) route 3 (three) times daily.  Qty: 200 strip, Refills: 5    Comments: Okay to fill whichever brand covered by insurance.  Associated Diagnoses: Type 2 diabetes mellitus without complication, without long-term current use of insulin      blood-glucose meter kit Use as instructed  Qty: 1 each, Refills: 0    Associated Diagnoses: Type 2 diabetes mellitus without complication, without long-term current use of insulin      calcium-vitamin D3 (OS-KASSANDRA 500 + D3) 500  mg-5 mcg (200 unit) per tablet Take 1 tablet by mouth once daily.      cranberry extract 200 mg Cap Take 1 capsule by mouth once daily.      diclofenac sodium (VOLTAREN) 1 % Gel Apply 2 g topically 2 (two) times daily. Apply to affected area as needed for pain.  Qty: 100 g, Refills: 3      donepeziL (ARICEPT) 10 MG tablet TAKE 1 TABLET(10 MG) BY MOUTH EVERY EVENING  Qty: 90 tablet, Refills: 3      flash glucose scanning reader (FREESTYLE EFREN 14 DAY READER) Misc Check blood glucose level 3 times daily.  Qty: 1 each, Refills: 0    Associated Diagnoses: Type 2 diabetes mellitus without complication, without long-term current use of insulin      flash glucose sensor (FREESTYLE EFREN 14 DAY SENSOR) Kit APPLY 1 SENSOR EVERY 14 DAYS  Qty: 6 kit, Refills: 3    Associated Diagnoses: Type 2 diabetes mellitus without complication, without long-term current use of insulin      hydrocortisone 2.5 % cream Apply topically 2 (two) times daily as needed.  Qty: 1 each, Refills: 1      lancets (FREESTYLE LANCETS) 28 gauge Misc 30 lancets by Misc.(Non-Drug; Combo Route) route Daily.  Qty: 30 each, Refills: 3      LIDOcaine (LIDODERM) 5 % APPLY 1 TO 3 PATCHES TO BACK EVERY DAY. REMOVE WITHIN 12 HOURS  Qty: 30 patch, Refills: 2    Associated Diagnoses: Back pain, unspecified back location, unspecified back pain laterality, unspecified chronicity      magnesium 30 mg Tab Take 30 mg by mouth once. Patient does not know actual dose in MG      magnesium citrate solution Take 300 mLs by mouth.      mometasone (ELOCON) 0.1 % ointment Apply topically 2 (two) times a day. Mix 50/50 with mupirocin ointment. Apply in each nostril twice daily.  Qty: 15 g, Refills: 1    Associated Diagnoses: Rhinitis, unspecified type; Bleeding nose      naproxen (EC-NAPROSYN) 375 MG TbEC EC tablet Take 375 mg by mouth 2 (two) times daily with meals.      ospemifene (OSPHENA) 60 mg Tab Take by mouth.      PREMARIN vaginal cream INSERT 0.5 GRAM VAGINALLY 2 TIMES A  WEEK  Qty: 30 g, Refills: 3    Associated Diagnoses: Vaginal atrophy      triamcinolone acetonide 0.1% (KENALOG) 0.1 % paste 3 (three) times daily.      ubrogepant (UBRELVY) 100 mg tablet Take 1 tablet daily as needed for migraine headache. May take 1 additional tablet 2 hours later if needed.  Qty: 16 tablet, Refills: 2      valACYclovir (VALTREX) 500 MG tablet Take 1 tablet (500 mg total) by mouth 2 (two) times a day.  Qty: 180 tablet, Refills: 1    Associated Diagnoses: Herpes zoster without complication                 Discharge Diagnosis: Bilateral sacroiliitis [M46.1]  Condition on Discharge: Stable with no complications to procedure   Diet on Discharge: Same as before.  Activity: as per instruction sheet.  Discharge to: Home with a responsible adult.  Follow up: 2-4 weeks       Please call my office or pager at [phone] if experienced any weakness or loss of sensation, fever > 101.5, pain uncontrolled with oral medications, persistent nausea/vomiting/or diarrhea, redness or drainage from the incisions, or any other worrisome concerns. If physician on call was not reached or could not communicate with our office for any reason please go to the nearest emergency department      Charly Mahmood MD PhD

## 2025-07-28 ENCOUNTER — PATIENT MESSAGE (OUTPATIENT)
Dept: INTERNAL MEDICINE | Facility: CLINIC | Age: 80
End: 2025-07-28
Payer: MEDICARE

## 2025-07-28 DIAGNOSIS — Z87.440 HISTORY OF RECURRENT UTIS: ICD-10-CM

## 2025-07-28 DIAGNOSIS — E11.9 TYPE 2 DIABETES MELLITUS WITHOUT COMPLICATION, WITHOUT LONG-TERM CURRENT USE OF INSULIN: Chronic | ICD-10-CM

## 2025-07-28 DIAGNOSIS — R73.9 HYPERGLYCEMIA: Primary | ICD-10-CM

## 2025-07-28 NOTE — TELEPHONE ENCOUNTER
Discussed. Will get labs and UA when at [facility] tomorrow. Pt will be hesistant to insulin but we may be able to simply start the Glipizide previously sent. Will start with getting a finger stick glucose level to compare to Freestyle first though. Daughter notes last check already below 200 now though on the Freestyle.

## 2025-07-29 ENCOUNTER — OFFICE VISIT (OUTPATIENT)
Dept: UROLOGY | Facility: CLINIC | Age: 80
End: 2025-07-29
Payer: MEDICARE

## 2025-07-29 ENCOUNTER — LAB VISIT (OUTPATIENT)
Dept: LAB | Facility: HOSPITAL | Age: 80
End: 2025-07-29
Attending: INTERNAL MEDICINE
Payer: MEDICARE

## 2025-07-29 VITALS
SYSTOLIC BLOOD PRESSURE: 159 MMHG | WEIGHT: 130.5 LBS | BODY MASS INDEX: 23.87 KG/M2 | DIASTOLIC BLOOD PRESSURE: 79 MMHG | HEART RATE: 63 BPM

## 2025-07-29 DIAGNOSIS — Z87.440 HISTORY OF RECURRENT UTIS: ICD-10-CM

## 2025-07-29 DIAGNOSIS — N32.81 OAB (OVERACTIVE BLADDER): Primary | ICD-10-CM

## 2025-07-29 DIAGNOSIS — E11.9 TYPE 2 DIABETES MELLITUS WITHOUT COMPLICATION, WITHOUT LONG-TERM CURRENT USE OF INSULIN: ICD-10-CM

## 2025-07-29 DIAGNOSIS — R73.9 HYPERGLYCEMIA: ICD-10-CM

## 2025-07-29 DIAGNOSIS — R33.9 INCOMPLETE BLADDER EMPTYING: ICD-10-CM

## 2025-07-29 LAB
ABSOLUTE EOSINOPHIL (OHS): 0.12 K/UL
ABSOLUTE MONOCYTE (OHS): 0.55 K/UL (ref 0.3–1)
ABSOLUTE NEUTROPHIL COUNT (OHS): 4.32 K/UL (ref 1.8–7.7)
ALBUMIN SERPL BCP-MCNC: 4.1 G/DL (ref 3.5–5.2)
ALP SERPL-CCNC: 73 UNIT/L (ref 40–150)
ALT SERPL W/O P-5'-P-CCNC: 29 UNIT/L (ref 0–55)
ANION GAP (OHS): 11 MMOL/L (ref 8–16)
AST SERPL-CCNC: 32 UNIT/L (ref 0–50)
BACTERIA #/AREA URNS AUTO: ABNORMAL /HPF
BASOPHILS # BLD AUTO: 0.02 K/UL
BASOPHILS NFR BLD AUTO: 0.3 %
BILIRUB SERPL-MCNC: 1 MG/DL (ref 0.1–1)
BILIRUB UR QL STRIP.AUTO: NEGATIVE
BUN SERPL-MCNC: 21 MG/DL (ref 8–23)
CALCIUM SERPL-MCNC: 10 MG/DL (ref 8.7–10.5)
CHLORIDE SERPL-SCNC: 104 MMOL/L (ref 95–110)
CLARITY UR: ABNORMAL
CO2 SERPL-SCNC: 22 MMOL/L (ref 23–29)
COLOR UR AUTO: YELLOW
CREAT SERPL-MCNC: 1.2 MG/DL (ref 0.5–1.4)
EAG (OHS): 186 MG/DL (ref 68–131)
ERYTHROCYTE [DISTWIDTH] IN BLOOD BY AUTOMATED COUNT: 13.9 % (ref 11.5–14.5)
GFR SERPLBLD CREATININE-BSD FMLA CKD-EPI: 45 ML/MIN/1.73/M2
GLUCOSE SERPL-MCNC: 265 MG/DL (ref 70–110)
GLUCOSE UR QL STRIP: NEGATIVE
HBA1C MFR BLD: 8.1 % (ref 4–5.6)
HCT VFR BLD AUTO: 33.8 % (ref 37–48.5)
HGB BLD-MCNC: 11 GM/DL (ref 12–16)
HGB UR QL STRIP: NEGATIVE
IMM GRANULOCYTES # BLD AUTO: 0.03 K/UL (ref 0–0.04)
IMM GRANULOCYTES NFR BLD AUTO: 0.5 % (ref 0–0.5)
KETONES UR QL STRIP: NEGATIVE
LEUKOCYTE ESTERASE UR QL STRIP: ABNORMAL
LYMPHOCYTES # BLD AUTO: 1.14 K/UL (ref 1–4.8)
MCH RBC QN AUTO: 33.4 PG (ref 27–31)
MCHC RBC AUTO-ENTMCNC: 32.5 G/DL (ref 32–36)
MCV RBC AUTO: 103 FL (ref 82–98)
MICROSCOPIC COMMENT: ABNORMAL
NITRITE UR QL STRIP: NEGATIVE
NUCLEATED RBC (/100WBC) (OHS): 0 /100 WBC
PH UR STRIP: 6 [PH]
PLATELET # BLD AUTO: 70 K/UL (ref 150–450)
PMV BLD AUTO: 11.5 FL (ref 9.2–12.9)
POTASSIUM SERPL-SCNC: 4.4 MMOL/L (ref 3.5–5.1)
PROT SERPL-MCNC: 7.4 GM/DL (ref 6–8.4)
PROT UR QL STRIP: NEGATIVE
RBC # BLD AUTO: 3.29 M/UL (ref 4–5.4)
RBC #/AREA URNS AUTO: 1 /HPF (ref 0–4)
RELATIVE EOSINOPHIL (OHS): 1.9 %
RELATIVE LYMPHOCYTE (OHS): 18.4 % (ref 18–48)
RELATIVE MONOCYTE (OHS): 8.9 % (ref 4–15)
RELATIVE NEUTROPHIL (OHS): 70 % (ref 38–73)
SODIUM SERPL-SCNC: 137 MMOL/L (ref 136–145)
SP GR UR STRIP: 1.02
SQUAMOUS #/AREA URNS AUTO: 2 /HPF
UROBILINOGEN UR STRIP-ACNC: NEGATIVE EU/DL
WBC # BLD AUTO: 6.18 K/UL (ref 3.9–12.7)
WBC #/AREA URNS AUTO: 80 /HPF (ref 0–5)

## 2025-07-29 PROCEDURE — 99214 OFFICE O/P EST MOD 30 MIN: CPT | Mod: PBBFAC | Performed by: UROLOGY

## 2025-07-29 PROCEDURE — G2211 COMPLEX E/M VISIT ADD ON: HCPCS | Mod: ,,, | Performed by: UROLOGY

## 2025-07-29 PROCEDURE — 99204 OFFICE O/P NEW MOD 45 MIN: CPT | Mod: S$PBB,,, | Performed by: UROLOGY

## 2025-07-29 PROCEDURE — 85025 COMPLETE CBC W/AUTO DIFF WBC: CPT

## 2025-07-29 PROCEDURE — 81001 URINALYSIS AUTO W/SCOPE: CPT

## 2025-07-29 PROCEDURE — 87077 CULTURE AEROBIC IDENTIFY: CPT

## 2025-07-29 PROCEDURE — 36415 COLL VENOUS BLD VENIPUNCTURE: CPT

## 2025-07-29 PROCEDURE — 84520 ASSAY OF UREA NITROGEN: CPT

## 2025-07-29 PROCEDURE — 99999 PR PBB SHADOW E&M-EST. PATIENT-LVL IV: CPT | Mod: PBBFAC,,, | Performed by: UROLOGY

## 2025-07-29 PROCEDURE — 83036 HEMOGLOBIN GLYCOSYLATED A1C: CPT

## 2025-07-29 NOTE — PROGRESS NOTES
History of Present Illness    CHIEF COMPLAINT:  Ms. Judd presents today with concerns about blood sugar control and urinary symptoms.    URINARY SYMPTOMS:  She reports a history of bladder and kidney elimination concerns. Currently, she can go extended periods without feeling the need to urinate, which differs from her previous symptoms. Three years ago, she experienced significant urinary urgency and frequency that led to discussions about potential interventional treatments including an implantable device.  She states at that time her family member advised against her undergoing surgery.      FLUID INTAKE AND SLEEP:  She reports increased fluid intake, consuming two 32-ounce jugs of water daily plus additional water during the night. She keeps a large water jug by her chair and wakes up twice nightly to drink and hydrate.      ROS:  General: -fever, -chills, -fatigue, -weight gain, -weight loss  Eyes: -vision changes, -redness, -discharge  ENT: -ear pain, -nasal congestion, -sore throat  Cardiovascular: -chest pain, -palpitations, -lower extremity edema  Respiratory: -cough, -shortness of breath  Gastrointestinal: -abdominal pain, -nausea, -vomiting, -diarrhea, -constipation, -blood in stool  Genitourinary: -dysuria, -hematuria, +frequency, +incomplete emptying, +nocturia  Musculoskeletal: -joint pain, -muscle pain  Skin: -rash, -lesion  Neurological: -headache, -dizziness, -numbness, -tingling  Psychiatric: -anxiety, -depression, -sleep difficulty  Endocrine: +excessive thirst       Past Medical History:   Diagnosis Date    Abdominal pain     Allergic rhinitis     Arthritis     Blood platelet disorder     Blood platelet disorder     Blood transfusion     during delivery and     Bowel obstruction     Cervical radiculopathy     followed by dr cloud    Cirrhosis of liver     Colon cancer     transverse colon; resected; Stage IIA (pT3 pN0 MX)    Degenerative joint disease (DJD) of lumbar spine      Diabetes mellitus     Diarrhea     Falls 2020    Family history of breast cancer     Family history of colon cancer     Fatty liver     GERD (gastroesophageal reflux disease)     History of shingles     Hyperlipidemia     Hypomagnesemia     Hypothyroidism     Irritable bowel syndrome     Microscopic colitis     treated     Migraine     Myelodysplastic syndrome     Raynaud phenomenon     Raynaud's disease     Squamous cell carcinoma of skin     Type 2 diabetes mellitus     Usual hyperplasia of lactiferous duct 1970       Past Surgical History:   Procedure Laterality Date    ADENOIDECTOMY      APPENDECTOMY      BACK SURGERY      BREAST BIOPSY  1970    BREAST CYST EXCISION  1970?    BREAST LUMPECTOMY  1970    CARPAL TUNNEL RELEASE      bilateral      SECTION      CHOLECYSTECTOMY  1965    COLECTOMY  2011    Transverse colon resection by Dr. Aguirre    COLONOSCOPY N/A 2017    Procedure: COLONOSCOPY;  Surgeon: Manjit Alvarez MD;  Location: [facility] (4TH FLR);  Service: Endoscopy;  Laterality: N/A;    COLONOSCOPY N/A 2019    Procedure: COLONOSCOPY;  Surgeon: Ramiro Jefferson MD;  Location: [facility] (4TH FLR);  Service: Endoscopy;  Laterality: N/A;  PM prep    COLONOSCOPY N/A 2022    Procedure: COLONOSCOPY;  Surgeon: Ramiro Jefferson MD;  Location: [facility] (2ND FLR);  Service: Endoscopy;  Laterality: N/A;  EGD and colonoscopy in 6-8 weeks from now     states has constipation. -extended Golytely prep    CYSTOSCOPY N/A 2021    Procedure: CYSTOSCOPY;  Surgeon: Viridiana Valenzuela MD;  [address]  Service: Urology;  Laterality: N/A;    ENDOSCOPIC ULTRASOUND OF UPPER GASTROINTESTINAL TRACT N/A 2018    Procedure: ULTRASOUND, UPPER GI TRACT, ENDOSCOPIC;  Surgeon: Jose Hess MD;  [facility];  Service: Endoscopy;  Laterality: N/A;    ENDOSCOPIC ULTRASOUND OF UPPER GASTROINTESTINAL TRACT N/A 2019    Procedure: ULTRASOUND, UPPER GI TRACT,  ENDOSCOPIC;  Surgeon: Jose Hess MD;  Location: [facility] (2ND FLR);  Service: Endoscopy;  Laterality: N/A;    ENDOSCOPIC ULTRASOUND OF UPPER GASTROINTESTINAL TRACT N/A 10/25/2023    Procedure: ULTRASOUND, UPPER GI TRACT, ENDOSCOPIC;  Surgeon: Jose Campos MD;  Location: [facility];  Service: Endoscopy;  Laterality: N/A;  10/20/23: instructions sent via portal-GD    ESOPHAGOGASTRODUODENOSCOPY N/A 11/16/2018    Procedure: EGD (ESOPHAGOGASTRODUODENOSCOPY);  Surgeon: Angelo Reynolds MD;  Location: [facility] (2ND FLR);  Service: Endoscopy;  Laterality: N/A;    ESOPHAGOGASTRODUODENOSCOPY N/A 06/26/2019    Procedure: EGD (ESOPHAGOGASTRODUODENOSCOPY);  Surgeon: Ramiro Jefferson MD;  Location: [facility] (4TH FLR);  Service: Endoscopy;  Laterality: N/A;    ESOPHAGOGASTRODUODENOSCOPY N/A 05/04/2022    Procedure: EGD (ESOPHAGOGASTRODUODENOSCOPY);  Surgeon: Ramrio Jefferson MD;  Location: [facility] (2ND FLR);  Service: Endoscopy;  Laterality: N/A;  fully vaccinated-GT    ESOPHAGOGASTRODUODENOSCOPY N/A 10/25/2023    Procedure: EGD (ESOPHAGOGASTRODUODENOSCOPY);  Surgeon: Jose Campos MD;  Location: [facility];  Service: Endoscopy;  Laterality: N/A;    ESOPHAGOGASTRODUODENOSCOPY N/A 11/15/2023    Procedure: ESOPHAGOGASTRODUODENOSCOPY (EGD);  Surgeon: Db Sanchez MD;  Location: [facility] (4TH FLR);  Service: Endoscopy;  Laterality: N/A;  New diagnosis of esophageal varices. Not a candidate for beta blocker due to soft blood pressures. Recommend EV banding.  PT/INR done on 11/3/23 and CBC done on 9/30/23  ok per Dr. Espinoza to be done by 1st available provider-see tele encounter-st  1    EYE SURGERY      Cataract Removal    FLUOROSCOPIC URODYNAMIC STUDY N/A 11/09/2021    Procedure: URODYNAMIC STUDY, FLUOROSCOPIC;  Surgeon: Viridiana Valenzuela MD;  Location: [facility] OR [address] [place] [address];  Service: Urology;  Laterality: N/A;  90 minutes     HYSTERECTOMY      INJECTION, SACROILIAC JOINT Bilateral 7/25/2025    Procedure: B/L  SIJ;  Surgeon: Charly Mahmood MD;  Location: [facility];  Service: Pain Management;  Laterality: Bilateral;  no ac    JOINT REPLACEMENT      OOPHORECTOMY  1970    posterolateral fusion with autograft bone and Riley mineralized bone matrix  02/01/2013    at [facility] for lumbar spine stenosis    SMALL INTESTINE SURGERY      SPINE SURGERY      TOE SURGERY      TONSILLECTOMY      TRANSFORAMINAL EPIDURAL INJECTION OF STEROID Bilateral 12/21/2022    Procedure: Injection,steroid,epidural, Todd L5/S1 TF CONTRAST DIRECT REFERRAL;  Surgeon: Toni Osorio MD;  Location: [facility];  Service: Pain Management;  Laterality: Bilateral;    TRIGGER FINGER RELEASE         Family History   Problem Relation Name Age of Onset    Heart disease Father August 50        Mi age 50    Colon cancer Father August     Bladder Cancer Mother Mariana         non smoker    Cataracts Mother Mariana     Glaucoma Mother Mariana     Heart disease Mother Mariana     Hyperlipidemia Mother Mariana     Kidney disease Mother Mariana     Breast cancer Sister Sr. Damaris Dye 79    Arthritis Daughter Wendy     Asthma Daughter Wendy     Depression Daughter Wendy     Hypertension Daughter Wendy     Stroke Daughter Wendy 40    Arthritis Brother Timoteo     Colon cancer Brother Timoteo 70    Alcohol abuse Brother Timoteo     Parkinsonism Brother [place]     Alcohol abuse Brother [place]     Depression Daughter Danielle     Stroke Daughter Danielle     Alcohol abuse Brother Harjeet     Arthritis Brother Harjeet     Asthma Daughter Kimberly     Depression Daughter Kimberly      Social History[1]    Allergies:  Codeine; Dilaudid [hydromorphone (bulk)]; Percocet [oxycodone-acetaminophen]; Sulfa (sulfonamide antibiotics); Dilaudid [hydromorphone]; Latex, natural rubber; and Opioids - morphine analogues    Medications:  Encounter Medications[2]      PHYSICAL EXAMINATION:    The patient generally appears in good health, is appropriately interactive, and is in no apparent distress.    Skin:  No lesions.    Mental: Cooperative with normal affect.    Neuro: Grossly intact.    HEENT: Normal. No evidence of lymphadenopathy.    Chest:  normal inspiratory effort.    Extremities: No clubbing, cyanosis, or edema    PVR by bladder scan was 159 ml     LABS:    Lab Results   Component Value Date    BUN 13 05/20/2025    CREATININE 1.1 05/20/2025       Assessment & Plan      URINARY SYMPTOMS:  - Urinary symptoms, including reduced urge to urinate and ability to go long periods without urination.  - Discrepancy between sensation of bladder emptying and actual bladder function.  - Explained that sensation of bladder emptying does not always accurately represent actual bladder function.  - Discussed importance of proper hydration in relation to urinary function.  - Planning to evaluate bladder emptying with a bladder scan to objectively assess urinary retention.       INCOMPLETE BLADDER EMPTYING:  - Discussed not much available.  Can do CIC, SNM (caveat is an improvement of 50% is the benchmark for success) and PTNS  -  information sheets for PTNS and SNM were provided  - follow up in 1 month as she is not facile with My Chart.     Copy to:  Norman Guthrie MD    I spent a total of 45 minutes on the day of the visit.  This includes face to face time and non-face to face time preparing to see the patient (eg, review of tests), obtaining and/or reviewing separately obtained history, documenting clinical information in the electronic or other health record, independently interpreting results and communicating results to the patient/family/caregiver, or care coordinator.    Visit complexity today is associated with medical care services that are part of the ongoing care related to the single serious and/or complex condition of Overactive bladder (OAB) and incomplete bladder emptying. A longitudinal relationship exists or is being developed between the patient and this practitioner for the care of this condition.       This  note was generated with the assistance of ambient listening technology. Verbal consent was obtained by the patient and accompanying visitor(s) for the recording of patient appointment to facilitate this note. I attest to having reviewed and edited the generated note for accuracy, though some syntax or spelling errors may persist. Please contact the author of this note for any clarification.             [1]   Social History  Socioeconomic History    Marital status:    Tobacco Use    Smoking status: Never    Smokeless tobacco: Never   Substance and Sexual Activity    Alcohol use: Not Currently     Comment: socially, rarely    Drug use: Never    Sexual activity: Not Currently   Social History Narrative    , lives alone.  Primary support are her children and friends.     Social Drivers of Health     Financial Resource Strain: Low Risk  (3/30/2025)    Overall Financial Resource Strain (CARDIA)     Difficulty of Paying Living Expenses: Not hard at all   Food Insecurity: No Food Insecurity (3/30/2025)    Hunger Vital Sign     Worried About Running Out of Food in the Last Year: Never true     Ran Out of Food in the Last Year: Never true   Transportation Needs: No Transportation Needs (3/30/2025)    PRAPARE - Transportation     Lack of Transportation (Medical): No     Lack of Transportation (Non-Medical): No   Physical Activity: Inactive (3/30/2025)    Exercise Vital Sign     Days of Exercise per Week: 0 days     Minutes of Exercise per Session: 0 min   Stress: No Stress Concern Present (3/30/2025)    [employer] - Occupational Stress Questionnaire     Feeling of Stress : Not at all   Housing Stability: Low Risk  (3/30/2025)    Housing Stability Vital Sign     Unable to Pay for Housing in the Last Year: No     Homeless in the Last Year: No   [2]   Outpatient Encounter Medications as of 7/29/2025   Medication Sig Dispense Refill    acetaminophen (TYLENOL) 650 MG TbSR Take 1,300 mg by  mouth 2 (two) times a day.      ascorbic acid, vitamin C, (VITAMIN C) 1000 MG tablet Take 1,000 mg by mouth once daily.      azelastine (ASTELIN) 137 mcg (0.1 %) nasal spray 1 spray (137 mcg total) by Nasal route 2 (two) times daily. 30 mL 11    biotin 10,000 mcg TbDL Take 1 tablet by mouth once daily.       blood sugar diagnostic (FREESTYLE LITE STRIPS) Strp 1 strip by Misc.(Non-Drug; Combo Route) route 3 (three) times daily. 200 strip 5    calcium-vitamin D3 (OS-KASSANDRA 500 + D3) 500 mg-5 mcg (200 unit) per tablet Take 1 tablet by mouth once daily.      cranberry extract 200 mg Cap Take 1 capsule by mouth once daily.      diclofenac sodium (VOLTAREN) 1 % Gel Apply 2 g topically 2 (two) times daily. Apply to affected area as needed for pain. 100 g 3    donepeziL (ARICEPT) 5 MG tablet Take 1 tablet (5 mg total) by mouth every evening. 90 tablet 3    DULoxetine (CYMBALTA) 30 MG capsule Take 1 capsule (30 mg total) by mouth 2 (two) times daily. 180 capsule 3    flash glucose scanning reader (FREESTYLE EFREN 14 DAY READER) Misc Check blood glucose level 3 times daily. 1 each 0    flash glucose sensor (FREESTYLE EFREN 14 DAY SENSOR) Kit APPLY 1 SENSOR EVERY 14 DAYS 6 kit 3    gabapentin (NEURONTIN) 300 MG capsule Take 1 to 2 capsules 3 times daily if needed for leg pain. 180 capsule 5    glipiZIDE (GLUCOTROL) 5 MG tablet Take 1 tablet (5 mg total) by mouth daily with breakfast. 90 tablet 0    hydrocortisone 2.5 % cream Apply topically 2 (two) times daily as needed. 1 each 1    lactulose (CHRONULAC) 10 gram/15 mL solution TAKE 30 ML BY MOUTH THREE TIMES DAILY AS NEEDED FOR CONSTIPATION 300 mL 5    lancets (FREESTYLE LANCETS) 28 gauge Misc 30 lancets by Misc.(Non-Drug; Combo Route) route Daily. 30 each 3    levothyroxine (SYNTHROID) 75 MCG tablet TAKE 1 TABLET BY MOUTH BEFORE  BREAKFAST 90 tablet 3    LIDOcaine (LIDODERM) 5 % APPLY 1 TO 3 PATCHES TO BACK EVERY DAY. REMOVE WITHIN 12 HOURS 30 patch 2    linaGLIPtin (TRADJENTA)  5 mg Tab tablet TAKE 1 TABLET(5 MG) BY MOUTH EVERY DAY 90 tablet 3    magnesium 30 mg Tab Take 30 mg by mouth once. Patient does not know actual dose in MG      magnesium citrate solution Take 300 mLs by mouth.      metFORMIN (GLUMETZA) 1000 MG (MOD) 24hr tablet Take 1 tablet (1,000 mg total) by mouth daily with breakfast. 90 tablet 3    mometasone (ELOCON) 0.1 % ointment Apply topically 2 (two) times a day. Mix 50/50 with mupirocin ointment. Apply in each nostril twice daily. 15 g 1    naproxen (EC-NAPROSYN) 375 MG TbEC EC tablet Take 375 mg by mouth 2 (two) times daily with meals.      ospemifene (OSPHENA) 60 mg Tab Take by mouth.      pantoprazole (PROTONIX) 40 MG tablet TAKE 1 TABLET(40 MG) BY MOUTH TWICE DAILY 180 tablet 1    PREMARIN vaginal cream INSERT 0.5 GRAM VAGINALLY 2 TIMES A WEEK 30 g 3    rifAXIMin (XIFAXAN) 550 mg Tab Take 1 tablet (550 mg total) by mouth 2 (two) times daily. 60 tablet 11    traZODone (DESYREL) 50 MG tablet TAKE 1 TABLET(50 MG) BY MOUTH EVERY EVENING 90 tablet 3    triamcinolone acetonide 0.1% (KENALOG) 0.1 % paste 3 (three) times daily.      ubrogepant (UBRELVY) 100 mg tablet Take 1 tablet daily as needed for migraine headache. May take 1 additional tablet 2 hours later if needed. 16 tablet 2    valACYclovir (VALTREX) 500 MG tablet Take 1 tablet (500 mg total) by mouth 2 (two) times a day. 180 tablet 1    blood-glucose meter kit Use as instructed 1 each 0    [DISCONTINUED] donepeziL (ARICEPT) 10 MG tablet TAKE 1 TABLET(10 MG) BY MOUTH EVERY EVENING 90 tablet 3    [DISCONTINUED] hyoscyamine (ANASPAZ,LEVSIN) 0.125 mg Tab TAKE 1 TABLET(125 MCG) BY MOUTH EVERY 8 HOURS AS NEEDED FOR URGENCY 270 tablet 3     No facility-administered encounter medications on file as of 7/29/2025.

## 2025-07-30 ENCOUNTER — TELEPHONE (OUTPATIENT)
Dept: INTERNAL MEDICINE | Facility: CLINIC | Age: 80
End: 2025-07-30
Payer: MEDICARE

## 2025-07-30 ENCOUNTER — TELEPHONE (OUTPATIENT)
Dept: PAIN MEDICINE | Facility: CLINIC | Age: 80
End: 2025-07-30
Payer: MEDICARE

## 2025-07-30 DIAGNOSIS — N39.0 URINARY TRACT INFECTION WITHOUT HEMATURIA, SITE UNSPECIFIED: Primary | ICD-10-CM

## 2025-07-30 LAB — HOLD SPECIMEN: NORMAL

## 2025-07-30 RX ORDER — CEFUROXIME AXETIL 500 MG/1
500 TABLET ORAL 2 TIMES DAILY
Qty: 20 TABLET | Refills: 0 | Status: SHIPPED | OUTPATIENT
Start: 2025-07-30 | End: 2025-08-01

## 2025-07-30 NOTE — TELEPHONE ENCOUNTER
Recent hyperglycemia compared to baseline. Hx of recurrent UTI, not always with classic symptoms. Will treat. Starting Ceftin and awaiting culture results. Holding off on starting the Glipizide since sugars have come down per daughter. Not running 400's anymore, 200's and below now.

## 2025-08-01 ENCOUNTER — TELEPHONE (OUTPATIENT)
Dept: PRIMARY CARE CLINIC | Facility: CLINIC | Age: 80
End: 2025-08-01
Payer: MEDICARE

## 2025-08-01 DIAGNOSIS — N39.0 URINARY TRACT INFECTION WITHOUT HEMATURIA, SITE UNSPECIFIED: Primary | ICD-10-CM

## 2025-08-01 LAB — BACTERIA UR CULT: ABNORMAL

## 2025-08-01 RX ORDER — NITROFURANTOIN 25; 75 MG/1; MG/1
100 CAPSULE ORAL 2 TIMES DAILY
Qty: 20 CAPSULE | Refills: 0 | Status: SHIPPED | OUTPATIENT
Start: 2025-08-01

## 2025-08-04 ENCOUNTER — DOCUMENT SCAN (OUTPATIENT)
Dept: HOME HEALTH SERVICES | Facility: HOSPITAL | Age: 80
End: 2025-08-04
Payer: MEDICARE

## 2025-08-04 DIAGNOSIS — G47.00 INSOMNIA, UNSPECIFIED TYPE: ICD-10-CM

## 2025-08-04 NOTE — TELEPHONE ENCOUNTER
No care due was identified.  [facility] Embedded Care Due Messages. Reference number: [id number].   8/04/2025 5:54:21 PM CDT

## 2025-08-05 ENCOUNTER — PATIENT MESSAGE (OUTPATIENT)
Dept: UROLOGY | Facility: CLINIC | Age: 80
End: 2025-08-05
Payer: MEDICARE

## 2025-08-05 RX ORDER — TRAZODONE HYDROCHLORIDE 50 MG/1
50 TABLET ORAL NIGHTLY
Qty: 90 TABLET | Refills: 3 | Status: SHIPPED | OUTPATIENT
Start: 2025-08-05

## 2025-08-06 RX ORDER — DULOXETIN HYDROCHLORIDE 30 MG/1
CAPSULE, DELAYED RELEASE ORAL
Qty: 180 CAPSULE | Refills: 3 | Status: SHIPPED | OUTPATIENT
Start: 2025-08-06

## 2025-08-06 NOTE — TELEPHONE ENCOUNTER
No care due was identified.  [facility] Embedded Care Due Messages. Reference number: [id number].   8/06/2025 8:09:58 AM CDT

## 2025-08-06 NOTE — TELEPHONE ENCOUNTER
Refill Routing Note   Medication(s) are not appropriate for processing by [facility] for the following reason(s):        Required vitals abnormal    [facility] action(s):  Defer               Appointments  past 12m or future 3m with PCP    Date Provider   Last Visit   5/20/2025 Darci Guthrie MD   Next Visit   Visit date not found Darci Guthrie MD   ED visits in past 90 days: 0        Note composed:12:32 PM 08/06/2025

## 2025-08-08 ENCOUNTER — EXTERNAL HOME HEALTH (OUTPATIENT)
Dept: HOME HEALTH SERVICES | Facility: HOSPITAL | Age: 80
End: 2025-08-08
Payer: MEDICARE

## 2025-08-14 ENCOUNTER — PATIENT MESSAGE (OUTPATIENT)
Dept: INTERNAL MEDICINE | Facility: CLINIC | Age: 80
End: 2025-08-14
Payer: MEDICARE

## 2025-08-18 DIAGNOSIS — E03.9 HYPOTHYROIDISM, UNSPECIFIED TYPE: ICD-10-CM

## 2025-08-18 RX ORDER — LEVOTHYROXINE SODIUM 75 UG/1
75 TABLET ORAL
Qty: 90 TABLET | Refills: 3 | Status: SHIPPED | OUTPATIENT
Start: 2025-08-18

## 2025-08-19 ENCOUNTER — OFFICE VISIT (OUTPATIENT)
Dept: PAIN MEDICINE | Facility: CLINIC | Age: 80
End: 2025-08-19
Payer: MEDICARE

## 2025-08-19 VITALS
WEIGHT: 128.5 LBS | HEIGHT: 62 IN | BODY MASS INDEX: 23.65 KG/M2 | HEART RATE: 66 BPM | SYSTOLIC BLOOD PRESSURE: 116 MMHG | DIASTOLIC BLOOD PRESSURE: 65 MMHG

## 2025-08-19 DIAGNOSIS — M79.18 MYOFASCIAL PAIN SYNDROME: Primary | ICD-10-CM

## 2025-08-19 DIAGNOSIS — M46.1 BILATERAL SACROILIITIS: ICD-10-CM

## 2025-08-19 PROCEDURE — G2211 COMPLEX E/M VISIT ADD ON: HCPCS | Mod: ,,, | Performed by: STUDENT IN AN ORGANIZED HEALTH CARE EDUCATION/TRAINING PROGRAM

## 2025-08-19 PROCEDURE — 99999 PR PBB SHADOW E&M-EST. PATIENT-LVL V: CPT | Mod: PBBFAC,,, | Performed by: STUDENT IN AN ORGANIZED HEALTH CARE EDUCATION/TRAINING PROGRAM

## 2025-08-19 PROCEDURE — 99215 OFFICE O/P EST HI 40 MIN: CPT | Mod: PBBFAC | Performed by: STUDENT IN AN ORGANIZED HEALTH CARE EDUCATION/TRAINING PROGRAM

## 2025-08-19 PROCEDURE — 99214 OFFICE O/P EST MOD 30 MIN: CPT | Mod: S$PBB,,, | Performed by: STUDENT IN AN ORGANIZED HEALTH CARE EDUCATION/TRAINING PROGRAM

## 2025-08-19 RX ORDER — METHOCARBAMOL 500 MG/1
500 TABLET, FILM COATED ORAL 3 TIMES DAILY PRN
Qty: 90 TABLET | Refills: 2 | Status: SHIPPED | OUTPATIENT
Start: 2025-08-19

## 2025-08-23 DIAGNOSIS — K59.00 CONSTIPATION, UNSPECIFIED CONSTIPATION TYPE: ICD-10-CM

## 2025-08-25 RX ORDER — LACTULOSE 10 G/15ML
SOLUTION ORAL; RECTAL
Qty: 300 ML | Refills: 5 | Status: SHIPPED | OUTPATIENT
Start: 2025-08-25

## 2025-08-29 DIAGNOSIS — K76.6 PORTAL HYPERTENSION: ICD-10-CM

## 2025-08-29 DIAGNOSIS — I85.10 SECONDARY ESOPHAGEAL VARICES WITHOUT BLEEDING: ICD-10-CM

## 2025-09-02 ENCOUNTER — OFFICE VISIT (OUTPATIENT)
Dept: OTOLARYNGOLOGY | Facility: CLINIC | Age: 80
End: 2025-09-02
Payer: MEDICARE

## 2025-09-02 VITALS
SYSTOLIC BLOOD PRESSURE: 108 MMHG | HEIGHT: 62 IN | BODY MASS INDEX: 23.65 KG/M2 | WEIGHT: 128.5 LBS | HEART RATE: 62 BPM | DIASTOLIC BLOOD PRESSURE: 49 MMHG

## 2025-09-02 DIAGNOSIS — Z98.890 HISTORY OF SINUS SURGERY: ICD-10-CM

## 2025-09-02 DIAGNOSIS — R51.9 FACIAL PAIN: Primary | ICD-10-CM

## 2025-09-02 DIAGNOSIS — H60.311 DIFFUSE OTITIS EXTERNA OF RIGHT EAR, UNSPECIFIED CHRONICITY: ICD-10-CM

## 2025-09-02 DIAGNOSIS — R04.0 RIGHT-SIDED EPISTAXIS: ICD-10-CM

## 2025-09-02 RX ORDER — OFLOXACIN 3 MG/ML
5 SOLUTION AURICULAR (OTIC) DAILY
Qty: 10 ML | Refills: 0 | Status: SHIPPED | OUTPATIENT
Start: 2025-09-02 | End: 2025-09-07

## 2025-09-03 ENCOUNTER — TELEPHONE (OUTPATIENT)
Dept: INTERNAL MEDICINE | Facility: CLINIC | Age: 80
End: 2025-09-03
Payer: MEDICARE

## 2025-09-04 ENCOUNTER — OFFICE VISIT (OUTPATIENT)
Dept: UROLOGY | Facility: CLINIC | Age: 80
End: 2025-09-04
Payer: MEDICARE

## 2025-09-04 VITALS
HEART RATE: 62 BPM | BODY MASS INDEX: 23.27 KG/M2 | WEIGHT: 127.19 LBS | SYSTOLIC BLOOD PRESSURE: 123 MMHG | DIASTOLIC BLOOD PRESSURE: 52 MMHG

## 2025-09-04 DIAGNOSIS — N39.41 URGE INCONTINENCE: ICD-10-CM

## 2025-09-04 DIAGNOSIS — N32.81 OAB (OVERACTIVE BLADDER): Primary | ICD-10-CM

## 2025-09-04 PROCEDURE — 99999 PR PBB SHADOW E&M-EST. PATIENT-LVL IV: CPT | Mod: PBBFAC,,, | Performed by: UROLOGY

## 2025-09-04 PROCEDURE — 99214 OFFICE O/P EST MOD 30 MIN: CPT | Mod: PBBFAC | Performed by: UROLOGY

## 2025-09-04 PROCEDURE — 99213 OFFICE O/P EST LOW 20 MIN: CPT | Mod: S$PBB,,, | Performed by: UROLOGY

## 2025-09-04 PROCEDURE — G2211 COMPLEX E/M VISIT ADD ON: HCPCS | Mod: ,,, | Performed by: UROLOGY

## 2025-09-05 ENCOUNTER — TELEPHONE (OUTPATIENT)
Dept: INTERNAL MEDICINE | Facility: CLINIC | Age: 80
End: 2025-09-05
Payer: MEDICARE

## 2025-09-05 DIAGNOSIS — E11.9 TYPE 2 DIABETES MELLITUS WITHOUT COMPLICATION, WITHOUT LONG-TERM CURRENT USE OF INSULIN: Primary | Chronic | ICD-10-CM

## 2025-09-05 RX ORDER — METFORMIN HYDROCHLORIDE 1000 MG/1
1000 TABLET ORAL 2 TIMES DAILY WITH MEALS
Qty: 180 TABLET | Refills: 3 | Status: SHIPPED | OUTPATIENT
Start: 2025-09-05 | End: 2026-09-05

## (undated) DEVICE — CONTAINER SPECIMEN STRL 4OZ

## (undated) DEVICE — DRESSING AQUACEL FOAM 5 X 5

## (undated) DEVICE — TOWEL OR XRAY WHITE 17X26IN

## (undated) DEVICE — DRESSING LEUKOPLAST FLEX 1X3IN

## (undated) DEVICE — ELECTRODE REM PLYHSV RETURN 9

## (undated) DEVICE — SUT 1 48IN PDS II VIO MONO

## (undated) DEVICE — SUT SILK 3-0 SH 18IN BLACK

## (undated) DEVICE — SEE MEDLINE ITEM 156902

## (undated) DEVICE — DRAPE ABDOMINAL TIBURON 14X11

## (undated) DEVICE — SEE MEDLINE ITEM 146417

## (undated) DEVICE — DRAPE INCISE IOBAN 2 23X17IN

## (undated) DEVICE — BLADE 4 INCH EDGE UN-INS

## (undated) DEVICE — ADHESIVE DERMABOND ADVANCED

## (undated) DEVICE — DRESSING ADH ISLAND 3.6 X 14

## (undated) DEVICE — SPONGE IV DRAIN 4X4 STERILE

## (undated) DEVICE — TRAY CATH UM FOLEY SIL W 16FR

## (undated) DEVICE — TIP YANKAUERS BULB NO VENT